# Patient Record
Sex: MALE | Race: WHITE | NOT HISPANIC OR LATINO | Employment: OTHER | URBAN - METROPOLITAN AREA
[De-identification: names, ages, dates, MRNs, and addresses within clinical notes are randomized per-mention and may not be internally consistent; named-entity substitution may affect disease eponyms.]

---

## 2014-06-18 LAB — EJECTION FRACTION: 53

## 2018-02-21 ENCOUNTER — TRANSFERRED RECORDS (OUTPATIENT)
Dept: HEALTH INFORMATION MANAGEMENT | Facility: CLINIC | Age: 78
End: 2018-02-21

## 2018-03-05 ENCOUNTER — TRANSFERRED RECORDS (OUTPATIENT)
Dept: HEALTH INFORMATION MANAGEMENT | Facility: CLINIC | Age: 78
End: 2018-03-05

## 2018-03-05 LAB
ALT SERPL-CCNC: 20 IU/L (ref 6–60)
AST SERPL-CCNC: 24 IU/L (ref 5–40)
CREAT SERPL-MCNC: 0.81 MG/DL (ref 0.7–1.5)
GFR SERPL CREATININE-BSD FRML MDRD: 92 ML/MIN
GLUCOSE SERPL-MCNC: 130 MG/DL (ref 65–120)
INR PPP: 1.16 (ref 0–3.5)
POTASSIUM SERPL-SCNC: 141 MMOL/L (ref 3.6–5.3)

## 2018-04-16 ENCOUNTER — TRANSFERRED RECORDS (OUTPATIENT)
Dept: HEALTH INFORMATION MANAGEMENT | Facility: CLINIC | Age: 78
End: 2018-04-16

## 2018-08-07 LAB
CHOLEST SERPL-MCNC: 142 MG/DL
HBA1C MFR BLD: 6.6 % (ref 0–5.6)
HDLC SERPL-MCNC: 41 MG/DL
LDLC SERPL CALC-MCNC: 75 MG/DL
NONHDLC SERPL-MCNC: 102 MG/DL
TRIGL SERPL-MCNC: 135 MG/DL

## 2018-12-12 ENCOUNTER — TRANSFERRED RECORDS (OUTPATIENT)
Dept: HEALTH INFORMATION MANAGEMENT | Facility: CLINIC | Age: 78
End: 2018-12-12

## 2019-02-27 ENCOUNTER — TRANSFERRED RECORDS (OUTPATIENT)
Dept: HEALTH INFORMATION MANAGEMENT | Facility: CLINIC | Age: 79
End: 2019-02-27

## 2019-02-27 LAB
AST SERPL-CCNC: 25 U/L (ref 8–48)
CHOLEST SERPL-MCNC: 127 MG/DL
CREAT SERPL-MCNC: 0.94 MG/DL (ref 0.74–1.35)
EJECTION FRACTION: 66
GFR SERPL CREATININE-BSD FRML MDRD: 77 ML/MIN/1.73M2
GLUCOSE SERPL-MCNC: 120 MG/DL (ref 70–100)
HBA1C MFR BLD: 5.8 % (ref 4–5.6)
HDLC SERPL-MCNC: 43 MG/DL
INR PPP: 1.6
LDLC SERPL CALC-MCNC: 54 MG/DL
NONHDLC SERPL-MCNC: 84 MG/DL
POTASSIUM SERPL-SCNC: 4 MMOL/L (ref 3.6–5.2)
TRIGL SERPL-MCNC: 151 MG/DL
TSH SERPL-ACNC: 0.3 MIU/L (ref 0.3–4.2)

## 2019-02-28 ENCOUNTER — TRANSFERRED RECORDS (OUTPATIENT)
Dept: HEALTH INFORMATION MANAGEMENT | Facility: CLINIC | Age: 79
End: 2019-02-28

## 2019-03-14 ENCOUNTER — TRANSFERRED RECORDS (OUTPATIENT)
Dept: HEALTH INFORMATION MANAGEMENT | Facility: CLINIC | Age: 79
End: 2019-03-14

## 2019-03-26 ENCOUNTER — OFFICE VISIT (OUTPATIENT)
Dept: FAMILY MEDICINE | Facility: CLINIC | Age: 79
End: 2019-03-26
Payer: MEDICARE

## 2019-03-26 VITALS
DIASTOLIC BLOOD PRESSURE: 70 MMHG | HEART RATE: 86 BPM | WEIGHT: 296 LBS | HEIGHT: 72 IN | TEMPERATURE: 98.6 F | BODY MASS INDEX: 40.09 KG/M2 | SYSTOLIC BLOOD PRESSURE: 117 MMHG | OXYGEN SATURATION: 92 %

## 2019-03-26 DIAGNOSIS — E55.9 VITAMIN D DEFICIENCY: ICD-10-CM

## 2019-03-26 DIAGNOSIS — Z00.00 ROUTINE GENERAL MEDICAL EXAMINATION AT A HEALTH CARE FACILITY: Primary | ICD-10-CM

## 2019-03-26 DIAGNOSIS — E03.9 ACQUIRED HYPOTHYROIDISM: ICD-10-CM

## 2019-03-26 DIAGNOSIS — J44.9 CHRONIC OBSTRUCTIVE PULMONARY DISEASE, UNSPECIFIED COPD TYPE (H): ICD-10-CM

## 2019-03-26 DIAGNOSIS — N40.0 PROSTATISM: ICD-10-CM

## 2019-03-26 DIAGNOSIS — I48.20 CHRONIC ATRIAL FIBRILLATION (H): ICD-10-CM

## 2019-03-26 DIAGNOSIS — I25.10 CORONARY ARTERY DISEASE INVOLVING NATIVE CORONARY ARTERY OF NATIVE HEART WITHOUT ANGINA PECTORIS: ICD-10-CM

## 2019-03-26 DIAGNOSIS — E78.5 HYPERLIPIDEMIA LDL GOAL <100: ICD-10-CM

## 2019-03-26 DIAGNOSIS — F43.21 ADJUSTMENT DISORDER WITH DEPRESSED MOOD: ICD-10-CM

## 2019-03-26 DIAGNOSIS — I10 ESSENTIAL HYPERTENSION: ICD-10-CM

## 2019-03-26 PROCEDURE — 99203 OFFICE O/P NEW LOW 30 MIN: CPT | Performed by: INTERNAL MEDICINE

## 2019-03-26 RX ORDER — METOLAZONE 2.5 MG/1
2.5 TABLET ORAL WEEKLY
COMMUNITY
Start: 2018-12-13 | End: 2019-05-21

## 2019-03-26 RX ORDER — TAURINE 500 MG
500 CAPSULE ORAL 2 TIMES DAILY
COMMUNITY
Start: 2015-07-14 | End: 2021-12-23

## 2019-03-26 RX ORDER — UBIDECARENONE 100 MG
100 CAPSULE ORAL DAILY
COMMUNITY
End: 2022-09-17

## 2019-03-26 RX ORDER — ACETYLCARNITINE 500 MG
1 CAPSULE ORAL 2 TIMES DAILY
COMMUNITY
Start: 2014-12-02 | End: 2021-12-23

## 2019-03-26 RX ORDER — ISOSORBIDE MONONITRATE 30 MG/1
30 TABLET, EXTENDED RELEASE ORAL DAILY
COMMUNITY
Start: 2018-06-22 | End: 2019-05-21

## 2019-03-26 RX ORDER — MULTIVITAMIN
1 TABLET ORAL DAILY
COMMUNITY
Start: 2018-07-12

## 2019-03-26 RX ORDER — TAMSULOSIN HYDROCHLORIDE 0.4 MG/1
1 CAPSULE ORAL DAILY
COMMUNITY
Start: 2014-12-02 | End: 2019-05-21

## 2019-03-26 RX ORDER — ALBUTEROL SULFATE 90 UG/1
2 AEROSOL, METERED RESPIRATORY (INHALATION) EVERY 4 HOURS PRN
COMMUNITY
Start: 2018-10-09 | End: 2019-09-30

## 2019-03-26 RX ORDER — ASPIRIN 81 MG/1
1 TABLET, CHEWABLE ORAL DAILY
COMMUNITY
Start: 2015-11-27

## 2019-03-26 RX ORDER — LEVOTHYROXINE SODIUM 175 UG/1
1 TABLET ORAL DAILY
COMMUNITY
Start: 2015-06-03 | End: 2020-02-10

## 2019-03-26 RX ORDER — CLONAZEPAM 0.5 MG/1
0.5 TABLET ORAL 2 TIMES DAILY PRN
COMMUNITY
Start: 2014-12-02 | End: 2019-05-16

## 2019-03-26 RX ORDER — FOLIC ACID 0.8 MG
1 TABLET ORAL DAILY
COMMUNITY
End: 2019-08-20

## 2019-03-26 RX ORDER — VENLAFAXINE 37.5 MG/1
37.5 TABLET ORAL 2 TIMES DAILY
COMMUNITY
Start: 2019-02-16 | End: 2020-02-10

## 2019-03-26 RX ORDER — ATORVASTATIN CALCIUM 40 MG/1
1 TABLET, FILM COATED ORAL EVERY EVENING
COMMUNITY
Start: 2014-12-02 | End: 2019-04-11

## 2019-03-26 RX ORDER — POTASSIUM CHLORIDE 750 MG/1
10 TABLET, EXTENDED RELEASE ORAL 2 TIMES DAILY
COMMUNITY
Start: 2018-11-28 | End: 2019-11-12

## 2019-03-26 RX ORDER — CARVEDILOL 12.5 MG/1
12.5 TABLET ORAL 2 TIMES DAILY
COMMUNITY
Start: 2018-12-06 | End: 2020-02-10

## 2019-03-26 RX ORDER — DILTIAZEM HYDROCHLORIDE 300 MG/1
300 CAPSULE, COATED, EXTENDED RELEASE ORAL DAILY
COMMUNITY
Start: 2018-09-14 | End: 2019-07-10

## 2019-03-26 RX ORDER — FUROSEMIDE 20 MG
3 TABLET ORAL 2 TIMES DAILY
COMMUNITY
Start: 2018-11-08 | End: 2019-05-02

## 2019-03-26 RX ORDER — WARFARIN SODIUM 5 MG/1
1-1.5 TABLET ORAL DAILY
COMMUNITY
Start: 2014-12-02 | End: 2019-04-11

## 2019-03-26 RX ORDER — LISINOPRIL 40 MG/1
40 TABLET ORAL DAILY
COMMUNITY
Start: 2018-12-06 | End: 2020-02-10

## 2019-03-26 RX ORDER — IPRATROPIUM BROMIDE 21 UG/1
1-2 SPRAY, METERED NASAL
COMMUNITY
Start: 2018-08-14 | End: 2019-08-06

## 2019-03-26 SDOH — HEALTH STABILITY: MENTAL HEALTH: HOW OFTEN DO YOU HAVE A DRINK CONTAINING ALCOHOL?: NEVER

## 2019-03-26 ASSESSMENT — MIFFLIN-ST. JEOR: SCORE: 2095.65

## 2019-03-26 NOTE — PROGRESS NOTES
"  SUBJECTIVE:   Erich Craven is a 78 year old male who presents to clinic today for the following health issues:      New Patient/Transfer of Care  Hx copd, CAD paroxsymal a fib, GERD, hypertension, heart failure, hypothyroidism and sleep apnea presents for ongoing follow up having moved from [place].     Patient presents to clinic to discuss multiple medical concerns. Sleep apnea is one concern as well is his medication management.    Notes he moved here last Dec and would like to establish PCP. About 1 month ago he went to [facility] for f/u who mentioned he should go back on CPAP. Stopped since he was knocking off at night. Had a mouth piece but was not helping. Wakes in the morning feeling a bit fatigued. Will need another sleep study.      Notes his R shoulder \"creeks\" when he reaches and might need some physical therapy.     Has been working on losing weight slowly. He is watching salt in his diet and getting back to the [facility].     Is establishing care with cardiologist at the end of April here. Last INR about 1 month ago and was 1.6.     Has not been taking Atrovent but is taking Albuterol since he ran out of his other inhaler.     Problem list and histories reviewed & adjusted, as indicated.  Additional history: as documented    There is no problem list on file for this patient.    No past surgical history on file.    Social History     Tobacco Use     Smoking status: Former Smoker     Smokeless tobacco: Never Used     Tobacco comment: quit in 1989   Substance Use Topics     Alcohol use: No     Frequency: Never     Comment: quit in 1989     No family history on file.      Current Outpatient Medications   Medication Sig Dispense Refill     Acetylcarnitine HCl (ACETYL L-CARNITINE) 500 MG CAPS Take 1 capsule by mouth 2 times daily       albuterol (PROAIR HFA/PROVENTIL HFA/VENTOLIN HFA) 108 (90 Base) MCG/ACT inhaler Inhale 2 puffs into the lungs every 4 hours as needed       aspirin (ASA) 81 MG chewable tablet " Take 1 tablet by mouth daily       atorvastatin (LIPITOR) 40 MG tablet Take 1 tablet by mouth every evening       CALCIUM PO Take 600 mg by mouth daily       carvedilol (COREG) 12.5 MG tablet Take 12.5 mg by mouth 2 times daily       Cholecalciferol (VITAMIN D3) 1000 units CAPS Take 1,000 Units by mouth daily       clonazePAM (KLONOPIN) 0.5 MG tablet Take 0.5 mg by mouth 2 times daily as needed       diltiazem ER COATED BEADS (CARDIZEM CD/CARTIA XT) 300 MG 24 hr capsule Take 300 mg by mouth daily       furosemide (LASIX) 20 MG tablet Take 3 tablets by mouth 2 times daily       Glycopyrrolate-Formoterol (BEVESPI AEROSPHERE) 9-4.8 MCG/ACT oral inhaler Inhale 2 puffs into the lungs 2 times daily       ipratropium (ATROVENT) 0.03 % nasal spray Inhale 1-2 sprays into the lungs nightly as needed       isosorbide mononitrate (IMDUR) 30 MG 24 hr tablet Take 30 mg by mouth daily       levothyroxine (SYNTHROID/LEVOTHROID) 175 MCG tablet Take 1 tablet by mouth daily Additional 1/2 tablet on Sundays       lisinopril (PRINIVIL/ZESTRIL) 40 MG tablet Take 40 mg by mouth daily       metolazone (ZAROXOLYN) 2.5 MG tablet Take 2.5 mg by mouth once a week On Thursday       Multiple vitamin TABS Take 1 tablet by mouth daily       potassium chloride ER (K-TAB/KLOR-CON) 10 MEQ CR tablet Take 10 mEq by mouth 2 times daily       tamsulosin (FLOMAX) 0.4 MG capsule Take 1 capsule by mouth daily       Taurine 500 MG CAPS Take 500 mg by mouth 2 times daily       venlafaxine (EFFEXOR) 37.5 MG tablet Take 37.5 mg by mouth 2 times daily       warfarin (COUMADIN) 5 MG tablet Take 1-1.5 tablets by mouth daily As directed       B Complex Vitamins (VITAMIN B COMPLEX PO) Take 1 capsule by mouth daily       co-enzyme Q-10 100 MG CAPS capsule Take 100 mg by mouth daily       Magnesium 500 MG CAPS Take 1 capsule by mouth daily       No Known Allergies  BP Readings from Last 3 Encounters:   03/26/19 117/70    Wt Readings from Last 3 Encounters:   03/26/19  134.3 kg (296 lb)                  Labs reviewed in EPIC    Reviewed and updated as needed this visit by clinical staff  Tobacco  Allergies  Meds  Soc Hx      Reviewed and updated as needed this visit by Provider         ROS:  Constitutional, HEENT, cardiovascular, pulmonary, GI, , musculoskeletal, neuro, skin, endocrine and psych systems are negative, except as otherwise noted.    2 pillow orthopnea, no PND or angina   OBJECTIVE:     /70 (BP Location: Left arm, Patient Position: Sitting, Cuff Size: Adult Large)   Pulse 86   Temp 98.6  F (37  C) (Oral)   Ht 1.829 m (6')   Wt 134.3 kg (296 lb)   SpO2 92%   BMI 40.14 kg/m    Body mass index is 40.14 kg/m .  GENERAL: healthy, alert and no distress  EYES: Eyes grossly normal to inspection, PERRL and conjunctivae and sclerae normal  HENT: ear canals and TM's normal, nose and mouth without ulcers or lesions  NECK: no adenopathy, no asymmetry, masses, or scars and thyroid normal to palpation  RESP: lungs clear to auscultation - rhonchi or wheezes rales in the R base  CV: regular rate and rhythm, normal S1 S2, no S3 or S4, no murmur, click or rub, no peripheral edema and peripheral pulses strong   ABDOMEN:obese, soft, nontender, no hepatosplenomegaly, no masses and bowel sounds normal  MS: no gross musculoskeletal defects noted, 2-3 + tibial edema with chronic venus stasis changes without palpable cords   R shoulder abduction is Less 90 degrees internal rotation less 90 degrees and external is 30 degrees   SKIN: no suspicious lesions or rashes  NEURO: Normal strength and tone, mentation intact and speech normal  PSYCH: mentation appears normal, affect normal/bright    Diagnostic Test Results:  No results found for this or any previous visit (from the past 24 hour(s)).    ASSESSMENT/PLAN:   1. Routine general medical examination at a health care facility    - Glycopyrrolate-Formoterol (BEVESPI AEROSPHERE) 9-4.8 MCG/ACT oral inhaler; Inhale 2 puffs into the  lungs 2 times daily  Dispense: 1 Inhaler; Refill: 3  - Glycopyrrolate-Formoterol (BEVESPI AEROSPHERE) 9-4.8 MCG/ACT oral inhaler; Inhale 2 puffs into the lungs 2 times daily  Dispense: 10.7 g; Refill: 3  1. Routine general medical examination at a health care facility    - Glycopyrrolate-Formoterol (BEVESPI AEROSPHERE) 9-4.8 MCG/ACT oral inhaler; Inhale 2 puffs into the lungs 2 times daily  Dispense: 1 Inhaler; Refill: 3  - Glycopyrrolate-Formoterol (BEVESPI AEROSPHERE) 9-4.8 MCG/ACT oral inhaler; Inhale 2 puffs into the lungs 2 times daily  Dispense: 10.7 g; Refill: 3    2. Chronic obstructive pulmonary disease, unspecified COPD type (H)      3. Chronic atrial fibrillation (H)      4. Adjustment disorder with depressed mood      5. Hyperlipidemia LDL goal <100      6. Prostatism      7. Acquired hypothyroidism      8. Coronary artery disease involving native coronary artery of native heart without angina pectoris      9. Essential hypertension      10. Vitamin D deficiency        Reviewed PMHx which he brought with him to review his recent [facility] reports.     Encouraged regular exercise and balanced diet. Embarking on new exercise routine.     Recently seen in [facility] and advised to get back into sleep clinic to discuss CPAP he can tolerate.     Establishing with cardiologist at the end of April. Will establish with our INR clinic here.     Shoulder is relatively frozen due to injury in the past. Will send to physical therapy along with cortisone injection prior to physical therapy. Return to clinic in 2 weeks for shoulder injection.         The information in this document, created by the medical scribe for me, accurately reflects the services I personally performed and the decisions made by me. I have reviewed and approved this document for accuracy prior to leaving the patient care area.  Dontrell Gómez MD  [facility]

## 2019-04-04 ENCOUNTER — ANTICOAGULATION THERAPY VISIT (OUTPATIENT)
Dept: NURSING | Facility: CLINIC | Age: 79
End: 2019-04-04
Payer: MEDICARE

## 2019-04-04 ENCOUNTER — TELEPHONE (OUTPATIENT)
Dept: FAMILY MEDICINE | Facility: CLINIC | Age: 79
End: 2019-04-04

## 2019-04-04 DIAGNOSIS — J44.9 CHRONIC OBSTRUCTIVE PULMONARY DISEASE, UNSPECIFIED COPD TYPE (H): Primary | ICD-10-CM

## 2019-04-04 DIAGNOSIS — I48.20 CHRONIC ATRIAL FIBRILLATION (H): Primary | ICD-10-CM

## 2019-04-04 PROBLEM — I48.91 ATRIAL FIBRILLATION (H): Status: ACTIVE | Noted: 2019-04-04

## 2019-04-04 PROBLEM — I82.409 DEEP VEIN THROMBOSIS (DVT) (H): Status: ACTIVE | Noted: 2019-04-04

## 2019-04-04 LAB — INR POINT OF CARE: 1.6 (ref 0.86–1.14)

## 2019-04-04 PROCEDURE — 85610 PROTHROMBIN TIME: CPT | Mod: QW

## 2019-04-04 PROCEDURE — 36416 COLLJ CAPILLARY BLOOD SPEC: CPT

## 2019-04-04 PROCEDURE — 99207 ZZC NO CHARGE NURSE ONLY: CPT

## 2019-04-04 NOTE — TELEPHONE ENCOUNTER
Routing refill request to provider for review/approval because:  Medication is reported/historical    Patient seen in INR today.   Established care with Dr Gómez 3-26-19.    Requesting RX Glycopyrrolate.  On med list---historical.  Not yet RX'd by Dr Gómez.      Also--no DX to associate with this med. Patient reports has COPD.   And patient reports takes only in the morning, not BID.      Patient out of the med.      Please review and complete.   Lanny GARNICA RN,BSN

## 2019-04-04 NOTE — TELEPHONE ENCOUNTER
Please COMPLETE and sign annual INR Referral--pended.   Please CLOSE encounter after completed.         Thank you,  Lanny GARNICA RN,BSN

## 2019-04-04 NOTE — PROGRESS NOTES
ANTICOAGULATION INITIAL CLINIC VISIT    Patient Name:  Erich Craven  Date:  2019  Referred by: Dr Gómez  Contact Type:  Face to Face    SUBJECTIVE:  Coumadin education was completed today.  Topics covered include:  -Introduction to coumadin  -Proper Administration  -INR Testing  -Sign/Symptoms of Bleeding  -Signs/Symptoms of Clot Formation or Stroke  -Dietary Intake of Vitamin K  -Drug Interactions  -Anticoagulation Identification (bracelet, necklace or wallet card)  -Future Surgery  -Effects of Alcohol, Tobacco, and Exercise on Coumadin    Coumadin Education Booklet and Coumadin Identification Wallet Card were given to the patient.         OBJECTIVE    INR Protime   Date Value Ref Range Status   2019 1.6 (A) 0.86 - 1.14 Final       ASSESSMENT / PLAN  INR assessment SUB    Recheck INR In: 1 WEEK    INR Location Clinic      Anticoagulation Summary  As of 2019    INR goal:   2.0-3.0   TTR:   --   INR used for dosin.6! (2019)   Warfarin maintenance plan:   7.5 mg (5 mg x 1.5) every Mon, Wed, Fri; 5 mg (5 mg x 1) all other days   Full warfarin instructions:   : 7.5 mg; : 7.5 mg; Otherwise 7.5 mg every Mon, Wed, Fri; 5 mg all other days   Weekly warfarin total:   42.5 mg   Plan last modified:   Lanny Spivey RN (2019)   Next INR check:   2019   Target end date:       Indications    Atrial fibrillation (H) [I48.91] [I48.91]  Deep vein thrombosis (DVT) (H) [I82.409] [I82.409]             Anticoagulation Episode Summary     INR check location:       Preferred lab:       Send INR reminders to:   [facility] ANTICOAGULATION    Comments:         Anticoagulation Care Providers     Provider Role Specialty Phone number    Dontrell Gómez MD Referring Internal Medicine [phone]            See the Encounter Report to view Anticoagulation Flowsheet and Dosing Calendar (Go to Encounters tab in chart review, and find the Anticoagulation Therapy Visit)    Dosage adjustment made based on physician  "directed care plan.    At today's visit patient's INR 1.6  Patient reports taking Warfarin since 2012 for A Fib and states has had a \"clot\".   Patient moved to [place] recently.   Established care with Dr Gómez.    Problem list not complete for this patient yet.      Some signs and symptoms of clots include: pain or tenderness in arm or leg, swelling in arm or leg, changes in skin color, or area is warm to touch, shortness or breath, trouble breathing.  Numbness or weakness especially on 1 side of the body, sudden trouble speaking or swallowing, sudden trouble seeing, sudden confusion, dizzy spells or headache.  If you have these please call 911 or seek medical care immediately.      Lanny Spivey RN            "

## 2019-04-11 ENCOUNTER — OFFICE VISIT (OUTPATIENT)
Dept: FAMILY MEDICINE | Facility: CLINIC | Age: 79
End: 2019-04-11
Payer: MEDICARE

## 2019-04-11 ENCOUNTER — ANTICOAGULATION THERAPY VISIT (OUTPATIENT)
Dept: NURSING | Facility: CLINIC | Age: 79
End: 2019-04-11
Payer: MEDICARE

## 2019-04-11 VITALS
BODY MASS INDEX: 40.63 KG/M2 | TEMPERATURE: 97.9 F | WEIGHT: 300 LBS | SYSTOLIC BLOOD PRESSURE: 117 MMHG | HEIGHT: 72 IN | DIASTOLIC BLOOD PRESSURE: 80 MMHG | OXYGEN SATURATION: 92 % | HEART RATE: 94 BPM

## 2019-04-11 DIAGNOSIS — E66.01 MORBID OBESITY (H): ICD-10-CM

## 2019-04-11 DIAGNOSIS — I48.20 CHRONIC ATRIAL FIBRILLATION (H): Primary | ICD-10-CM

## 2019-04-11 DIAGNOSIS — M75.91 RIGHT SUPRASPINATUS TENDINITIS: ICD-10-CM

## 2019-04-11 DIAGNOSIS — I82.4Y9 DEEP VEIN THROMBOSIS (DVT) OF PROXIMAL LOWER EXTREMITY, UNSPECIFIED CHRONICITY, UNSPECIFIED LATERALITY (H): ICD-10-CM

## 2019-04-11 DIAGNOSIS — E78.5 HYPERLIPIDEMIA LDL GOAL <100: ICD-10-CM

## 2019-04-11 DIAGNOSIS — M19.011 PRIMARY OSTEOARTHRITIS OF RIGHT SHOULDER: ICD-10-CM

## 2019-04-11 DIAGNOSIS — M15.0 PRIMARY OSTEOARTHRITIS INVOLVING MULTIPLE JOINTS: ICD-10-CM

## 2019-04-11 DIAGNOSIS — I48.91 ATRIAL FIBRILLATION (H): Primary | ICD-10-CM

## 2019-04-11 LAB — INR POINT OF CARE: 2.1 (ref 0.86–1.14)

## 2019-04-11 PROCEDURE — 20610 DRAIN/INJ JOINT/BURSA W/O US: CPT | Performed by: INTERNAL MEDICINE

## 2019-04-11 PROCEDURE — 85610 PROTHROMBIN TIME: CPT | Mod: QW

## 2019-04-11 PROCEDURE — 99207 ZZC NO CHARGE NURSE ONLY: CPT

## 2019-04-11 PROCEDURE — 20550 NJX 1 TENDON SHEATH/LIGAMENT: CPT | Mod: 51 | Performed by: INTERNAL MEDICINE

## 2019-04-11 PROCEDURE — 36416 COLLJ CAPILLARY BLOOD SPEC: CPT

## 2019-04-11 PROCEDURE — 99214 OFFICE O/P EST MOD 30 MIN: CPT | Mod: 25 | Performed by: INTERNAL MEDICINE

## 2019-04-11 RX ORDER — WARFARIN SODIUM 5 MG/1
5-7.5 TABLET ORAL DAILY
Qty: 135 TABLET | Refills: 0 | Status: SHIPPED | OUTPATIENT
Start: 2019-04-11 | End: 2019-07-03

## 2019-04-11 RX ORDER — WARFARIN SODIUM 5 MG/1
5-7.5 TABLET ORAL DAILY
Qty: 135 TABLET | Refills: 0 | Status: SHIPPED | OUTPATIENT
Start: 2019-04-11 | End: 2019-04-11

## 2019-04-11 RX ORDER — ATORVASTATIN CALCIUM 40 MG/1
40 TABLET, FILM COATED ORAL EVERY EVENING
Qty: 90 TABLET | Refills: 3 | Status: SHIPPED | OUTPATIENT
Start: 2019-04-11 | End: 2020-02-10

## 2019-04-11 ASSESSMENT — MIFFLIN-ST. JEOR: SCORE: 2113.79

## 2019-04-11 NOTE — PROGRESS NOTES
SUBJECTIVE:   Erich Craven is a 78 year old male who presents to clinic today for the following   health issues:      Patient presents to clinic for two week follow up on right shoulder pain, patient was wanting to get cortisone injection completed at today's visit.     Patient canceled his travel plans after last month's exam because of his poorly regulated INR.    Wants  a podiatrist    Inhaler is nonformulary wants  a cost effective substitute          Additional history: as documented    Reviewed  and updated as needed this visit by clinical staff  Tobacco  Allergies  Meds         Reviewed and updated as needed this visit by Provider         Current Outpatient Medications   Medication Sig Dispense Refill     Acetylcarnitine HCl (ACETYL L-CARNITINE) 500 MG CAPS Take 1 capsule by mouth 2 times daily       albuterol (PROAIR HFA/PROVENTIL HFA/VENTOLIN HFA) 108 (90 Base) MCG/ACT inhaler Inhale 2 puffs into the lungs every 4 hours as needed       aspirin (ASA) 81 MG chewable tablet Take 1 tablet by mouth daily       atorvastatin (LIPITOR) 40 MG tablet Take 1 tablet (40 mg) by mouth every evening 90 tablet 3     B Complex Vitamins (VITAMIN B COMPLEX PO) Take 1 capsule by mouth daily       CALCIUM PO Take 600 mg by mouth daily       carvedilol (COREG) 12.5 MG tablet Take 12.5 mg by mouth 2 times daily       Cholecalciferol (VITAMIN D3) 1000 units CAPS Take 1,000 Units by mouth daily       clonazePAM (KLONOPIN) 0.5 MG tablet Take 0.5 mg by mouth 2 times daily as needed       co-enzyme Q-10 100 MG CAPS capsule Take 100 mg by mouth daily       diltiazem ER COATED BEADS (CARDIZEM CD/CARTIA XT) 300 MG 24 hr capsule Take 300 mg by mouth daily       furosemide (LASIX) 20 MG tablet Take 3 tablets by mouth 2 times daily       Glycopyrrolate-Formoterol (BEVESPI AEROSPHERE) 9-4.8 MCG/ACT oral inhaler Inhale 2 puffs into the lungs 2 times daily 1 Inhaler 3     Glycopyrrolate-Formoterol (BEVESPI AEROSPHERE) 9-4.8 MCG/ACT oral  inhaler Inhale 2 puffs into the lungs 2 times daily 10.7 g 3     ipratropium (ATROVENT) 0.03 % nasal spray Inhale 1-2 sprays into the lungs nightly as needed       Ipratropium-Albuterol (COMBIVENT RESPIMAT)  MCG/ACT inhaler Inhale 2 puffs into the lungs 2 times daily 1 Inhaler 3     isosorbide mononitrate (IMDUR) 30 MG 24 hr tablet Take 30 mg by mouth daily       levothyroxine (SYNTHROID/LEVOTHROID) 175 MCG tablet Take 1 tablet by mouth daily Additional 1/2 tablet on Sundays       lisinopril (PRINIVIL/ZESTRIL) 40 MG tablet Take 40 mg by mouth daily       Magnesium 500 MG CAPS Take 1 capsule by mouth daily       metolazone (ZAROXOLYN) 2.5 MG tablet Take 2.5 mg by mouth once a week On Thursday       Multiple vitamin TABS Take 1 tablet by mouth daily       potassium chloride ER (K-TAB/KLOR-CON) 10 MEQ CR tablet Take 10 mEq by mouth 2 times daily       tamsulosin (FLOMAX) 0.4 MG capsule Take 1 capsule by mouth daily       Taurine 500 MG CAPS Take 500 mg by mouth 2 times daily       venlafaxine (EFFEXOR) 37.5 MG tablet Take 37.5 mg by mouth 2 times daily       warfarin (COUMADIN) 5 MG tablet Take 1-1.5 tablets (5-7.5 mg) by mouth daily As directed by INR clinic 135 tablet 0     No Known Allergies    ROS:  Constitutional, HEENT, cardiovascular, pulmonary, gi and gu systems are negative, except as otherwise noted.    OBJECTIVE:     /80 (BP Location: Right arm, Patient Position: Sitting, Cuff Size: Adult Large)   Pulse 94   Temp 97.9  F (36.6  C) (Oral)   Ht 1.829 m (6')   Wt 136.1 kg (300 lb)   SpO2 92%   BMI 40.69 kg/m    Body mass index is 40.69 kg/m .  GENERAL: healthy, alert and no distress  NECK: no adenopathy, no asymmetry, masses, or scars and thyroid normal to palpation  RESP: lungs clear to auscultation - no rales, rhonchi or wheezes  CV: Irregularly irregular rhythm with a well-controlled ventricular response normal S1 S2, no S3 or S4, no murmur, click or rub, no peripheral edema and peripheral  pulses strong  ABDOMEN: soft, nontender, no hepatosplenomegaly, no masses and bowel sounds normal  MS:  musculoskeletal defects noted in the right shoulder tenderness of the right supraspinatus tendon, flexion and extension were normal internal and external rotation of the right shoulder were reduced to less than 90 and less than 30 respectively  , 2+ pretibial edema    Procedure: Right supraspinatus tendinitis  After giving a review of the opportunities for improvement consent was given to proceed with a supraspinatus tendon injection.  The supraspinatus tendon was localized and marked lateral and medial to the tendon.  The skin was cleaned and prepped and using a 25-gauge 5/8 inch needle Decadron and lidocaine were injected alongside the medial and lateral aspects of the tendon without difficulty.    Procedure: Right glenohumeral joint injection.  Consent was obtained using a posterior approach the skin was demarcated, cleaned and prepped and using a 21-gauge 2 inch needle 60 mg of Medrol with lidocaine and Marcaine were injected without difficulty    ASSESSMENT/PLAN:             1. Chronic atrial fibrillation (H)    - SLEEP EVALUATION & MANAGEMENT REFERRAL - [facility] [phone]  (Age 18 and up); Future    Letter sent to the airline to request refund for missed travel  2. Deep vein thrombosis (DVT) of proximal lower extremity, unspecified chronicity, unspecified laterality (H)      3. Hyperlipidemia LDL goal <100    - atorvastatin (LIPITOR) 40 MG tablet; Take 1 tablet (40 mg) by mouth every evening  Dispense: 90 tablet; Refill: 3    4. Primary osteoarthritis involving multiple joints    - [facility] REFERRAL; Future    5. Morbid obesity (H)  Weight management indicated    6.  Supraspinatus tendinitis  After the steroid injection the patient was recommended to proceed with physical therapy to improve the functional mobility and strength of his right  shoulder    7.  Osteoarthritis of the right shoulder  Steroid injection and see recommendations above  Dontrell Gómez MD  [facility]

## 2019-04-11 NOTE — LETTER
April 11, 2019      Erich Craven  [address]  [facility] [place] [facility] [address]    To whom it may concern:    Mr. Craven is being followed for chronic deep venous thrombosis.  Because of his anticoagulation status he was highly recommended not to be flying and because of this he had to change his travel plans.  If you would please help him to be credited for his recent flight that he was forced to cancel.      If you require more information please contact me.        Sincerely,        Dontrell Gómez MD

## 2019-04-11 NOTE — PROGRESS NOTES
ANTICOAGULATION FOLLOW-UP CLINIC VISIT    Patient Name:  Erich Craven  Date:  2019  Contact Type:  Face to Face    SUBJECTIVE:     Patient Findings     Comments:   The patient was assessed for diet, medication, and activity level changes, missed or extra doses, bruising or bleeding, with no problem findings.             OBJECTIVE    INR Protime   Date Value Ref Range Status   2019 2.1 (A) 0.86 - 1.14 Final       ASSESSMENT / PLAN  INR assessment THER    Recheck INR In: 3 WEEKS    INR Location Clinic      Anticoagulation Summary  As of 2019    INR goal:   2.0-3.0   TTR:   --   INR used for dosin.1 (2019)   Warfarin maintenance plan:   7.5 mg (5 mg x 1.5) every Mon, Wed, Fri; 5 mg (5 mg x 1) all other days   Full warfarin instructions:   7.5 mg every Mon, Wed, Fri; 5 mg all other days   Weekly warfarin total:   42.5 mg   No change documented:   Lanny Spivey RN   Plan last modified:   Lanny Spivey RN (2019)   Next INR check:   2019   Target end date:       Indications    Atrial fibrillation (H) [I48.91] [I48.91]  Deep vein thrombosis (DVT) (H) [I82.409] [I82.409]             Anticoagulation Episode Summary     INR check location:       Preferred lab:       Send INR reminders to:    ANTICOAGULATION    Comments:         Anticoagulation Care Providers     Provider Role Specialty Phone number    Dontrell Gómez MD Referring Internal Medicine [phone]            See the Encounter Report to view Anticoagulation Flowsheet and Dosing Calendar (Go to Encounters tab in chart review, and find the Anticoagulation Therapy Visit)    Dosage adjustment made based on physician directed care plan.    Lanny Spivey RN

## 2019-04-14 PROBLEM — E66.01 MORBID OBESITY (H): Status: ACTIVE | Noted: 2019-04-14

## 2019-04-14 RX ORDER — DEXAMETHASONE SODIUM PHOSPHATE 4 MG/ML
4 INJECTION, SOLUTION INTRA-ARTICULAR; INTRALESIONAL; INTRAMUSCULAR; INTRAVENOUS; SOFT TISSUE ONCE
Qty: 1 ML | Refills: 0 | OUTPATIENT
Start: 2019-04-14 | End: 2019-08-06

## 2019-04-18 ENCOUNTER — THERAPY VISIT (OUTPATIENT)
Dept: PHYSICAL THERAPY | Facility: CLINIC | Age: 79
End: 2019-04-18
Payer: MEDICARE

## 2019-04-18 DIAGNOSIS — M15.0 PRIMARY OSTEOARTHRITIS INVOLVING MULTIPLE JOINTS: ICD-10-CM

## 2019-04-18 DIAGNOSIS — G89.29 CHRONIC RIGHT SHOULDER PAIN: ICD-10-CM

## 2019-04-18 DIAGNOSIS — M75.41 IMPINGEMENT SYNDROME, SHOULDER, RIGHT: ICD-10-CM

## 2019-04-18 DIAGNOSIS — M25.511 CHRONIC RIGHT SHOULDER PAIN: ICD-10-CM

## 2019-04-18 PROCEDURE — 97161 PT EVAL LOW COMPLEX 20 MIN: CPT | Mod: GP | Performed by: PHYSICAL THERAPIST

## 2019-04-18 PROCEDURE — 97112 NEUROMUSCULAR REEDUCATION: CPT | Mod: GP | Performed by: PHYSICAL THERAPIST

## 2019-04-18 NOTE — LETTER
DEPARTMENT OF HEALTH AND HUMAN SERVICES  CENTERS FOR MEDICARE & MEDICAID SERVICES    PLAN/UPDATED PLAN OF PROGRESS FOR OUTPATIENT REHABILITATION    PATIENTS NAME:  Erich Craven   : 1940    PROVIDER NUMBER:    [id number]    HICN:  [id number]     PROVIDER NAME: [facility] PHYSICAL THERAPY    MEDICAL RECORD NUMBER: [id number]     START OF CARE DATE:  SOC Date: 19   TYPE:  PT    PRIMARY/TREATMENT DIAGNOSIS: (Pertinent Medical Diagnosis)     Primary osteoarthritis involving multiple joints  Chronic right shoulder pain  Impingement syndrome, shoulder, right    VISITS FROM START OF CARE:  Rxs Used: 1     Precautions/Restrictions/MD instructions: PT eval and treat. 6 (e&t)  Subjective:   Date of Onset: Shoulder symptoms began about 2 years ago.   Order: 2019  Primary Symptoms: Pain with lifting arm overhead, behind the back, reaching out front or to the side.   Pains are described as constant in nature. Pain is rated 2/10.   History of symptoms: Pains began gradually as the result of insidious onset.     Worsened by: reaching out, above head, behind back, laying on shoulder, not taking tylenol.    Alleviated by: Tylenol.    Pertinent medical/surgical history: Atrial fibrillation: pacemaker for last 4 years, history of DVT in LE, patient takes coumadin. Chemical dependency, heart problems, high blood pressure, sleep disorder, thyroid problems, calf pain, swelling-warmth.  Imaging: no imaging on shoulder yet.   Current occupational status: Retired.   Patient's goals are: decrease pain, wants an exercise regime, lose weight, less pain with shoulder motion.   Return to MD:  PRN. Cardiologist at end of month. No follow up with physician noted.   *Patient denies any red flags.   Pain location: Anterior lateral arm, down to mid upper arm. RT>LT with symptoms.     Therapist Impression:   Patient is a 78 year old male with history of right shoulder pain. He presents with shoulder  impingement and signs and symptoms consistent with rotator cuff tendinopathy. These impairments limit his ability to reach, lift and perform ADL's. Skilled PT services are necessary in order to reduce impairments and improve independent function.    PATIENTS NAME:  Erich Craven   : 1940    SHOULDER EXAMINATION      Cervical Screen    Dermatome C4 C5 C6 C7 C8 T1    wnl wnl wnl wnl wnl wnl   DTR (RT) C5 C6 C7 (LT) C5 C6 C7              Myotome Right Left   C1-2 5/5 5/5   C3 5/5 5/5   C4 5/5 5/5   C5 4+/5, + 5-/5, +   C6 5/5 5/5/   C7 5/5 5/5   C8 5/5 5/5   T1 5/5 5/5   Special Tests     Spurlings     Compression     Distraction     Shoulder Screen    Hand Dominance: Right    STATIC POSTURE  Forward head on neck, Increased thoracic kyphosis and Rounded shoulders     SCAPULAR KINEMATIC EVALUATION  Position/Test Findings   Hands resting at sides No obvious findings   Hands on hips No obvious findings   90deg scaption No obvious findings   Repeated scaption No obvious findings   Resisted scaption NA   Flip sign/resisted ER NA     SHOULDER RANGE OF MOTION & STRENGTH  (*Indicates patient s pain)   AROM RT AROM LT PROM RT PROM LT MMT   Flex 110 + 130 + 120 + 140 + 4+/5 ++    + 130+ >105 + >130 + 4+/5 ++   ER 40+ 70 + 40++ 70 + 4+/5 +   IR sacrum lumbar   5/5   Ext wnl wnl      Add     5/5   Pull Test     negative   Elbow Flex wnl wnl      Elbow Ext Slight loss wnl        JOINT MOBILITY  NA  SPECIAL TESTS   (+) L: NA   (-) L: Load and shift  (+) R: NeerChristine-Ernie, Empty can and Positive painful arc   (-) R: Load and shift    Nerve Testing (ULTT)    ULTT RT Left   Median      Ulnar     Radial            Palpation: Tightness of bilateral trapezius noted.   Neuromuscular Assessment: patient demonstrates compensatory involvement of upper trapezius resulting in shoulder elevation and increase of shoulder pain and decrease in ROM.     Assessment/Plan:    Patient is a 78 year old male with both sides shoulder  complaints.    Patient has the following significant findings with corresponding treatment plan.                Diagnosis 1:  Bilateral shoulder pain, shoulder impingement with signs and symptoms consistent with rotator cuff tendinopathy    Pain -  hot/cold therapy and manual therapy  Decreased ROM/flexibility - manual therapy, therapeutic exercise and home program  Decreased strength - therapeutic exercise, therapeutic activities and home program  Impaired muscle performance - neuro re-education and home program  Decreased function - therapeutic activities and home program    Therapy Evaluation Codes:   1) History comprised of:   Personal factors that impact the plan of care:      Age.    Comorbidity factors that impact the plan of care are:      Chemical Dependency, Heart problems, Sleep disorder/apnea and thyroid problems, DVT.     Medications impacting care: Cardiac, High blood pressure, Heparin/coumadin, Pain and Thyroid.  2) Examination of Body Systems comprised of:   Body structures and functions that impact the plan of care:      Shoulder.   Activity limitations that impact the plan of care are:      Lifting and reaching.    PATIENTS NAME:  Erich Craven   : 1940    3) Clinical presentation characteristics are:   Stable/Uncomplicated.  4) Decision-Making    Low complexity using standardized patient assessment instrument and/or measureable assessment of functional outcome.  Cumulative Therapy Evaluation is: Low complexity.    Communication ability:  Patient appears to be able to clearly communicate and understand verbal and written communication and follow directions correctly.  Treatment Explanation - The following has been discussed with the patient:   RX ordered/plan of care  Anticipated outcomes  Possible risks and side effects  This patient would benefit from PT intervention to resume normal activities.   Rehab potential is good.  Frequency:  1 X week, once daily  Duration:  for 6  "weeks  Discharge Plan:  Achieve all LTG.  Independent in home treatment program.  Reach maximal therapeutic benefit.        Caregiver Signature/Credentials _____________________________ Date ________       Treating Provider: Kevin Busby DPT, CSCS     I have reviewed and certified the need for these services and plan of treatment while under my care.        PHYSICIAN'S SIGNATURE: ______________________________________  Date___________                         Dontrell Gómez MD    Certification period:  Beginning of Cert date period: 04/18/19 to  End of Cert period date: 06/22/19     Functional Level Progress Report: Please see attached \"Goal Flow sheet for Functional level.\"    ____X____Continue Services or________ DC Services                Service dates: From  SOC Date: 04/18/19 date to present                         "

## 2019-04-25 ENCOUNTER — DOCUMENTATION ONLY (OUTPATIENT)
Dept: CARDIOLOGY | Facility: CLINIC | Age: 79
End: 2019-04-25

## 2019-04-30 ENCOUNTER — ANCILLARY PROCEDURE (OUTPATIENT)
Dept: CARDIOLOGY | Facility: CLINIC | Age: 79
End: 2019-04-30
Attending: INTERNAL MEDICINE
Payer: MEDICARE

## 2019-04-30 ENCOUNTER — HOSPITAL ENCOUNTER (OUTPATIENT)
Dept: GENERAL RADIOLOGY | Facility: CLINIC | Age: 79
Discharge: HOME | End: 2019-04-30
Attending: INTERNAL MEDICINE | Admitting: INTERNAL MEDICINE
Payer: MEDICARE

## 2019-04-30 ENCOUNTER — OFFICE VISIT (OUTPATIENT)
Dept: CARDIOLOGY | Facility: CLINIC | Age: 79
End: 2019-04-30
Payer: MEDICARE

## 2019-04-30 VITALS
SYSTOLIC BLOOD PRESSURE: 134 MMHG | WEIGHT: 300.4 LBS | HEIGHT: 72 IN | DIASTOLIC BLOOD PRESSURE: 84 MMHG | HEART RATE: 84 BPM | BODY MASS INDEX: 40.69 KG/M2 | OXYGEN SATURATION: 93 %

## 2019-04-30 DIAGNOSIS — Z95.0 PRESENCE OF PERMANENT CARDIAC PACEMAKER: ICD-10-CM

## 2019-04-30 DIAGNOSIS — I25.10 CORONARY ARTERY DISEASE INVOLVING NATIVE CORONARY ARTERY OF NATIVE HEART WITHOUT ANGINA PECTORIS: ICD-10-CM

## 2019-04-30 DIAGNOSIS — I50.30 (HFPEF) HEART FAILURE WITH PRESERVED EJECTION FRACTION (H): ICD-10-CM

## 2019-04-30 DIAGNOSIS — I42.2 HYPERTROPHIC CARDIOMYOPATHY (H): ICD-10-CM

## 2019-04-30 DIAGNOSIS — E66.01 MORBID OBESITY (H): ICD-10-CM

## 2019-04-30 DIAGNOSIS — G47.33 OSA (OBSTRUCTIVE SLEEP APNEA): ICD-10-CM

## 2019-04-30 DIAGNOSIS — I50.30 (HFPEF) HEART FAILURE WITH PRESERVED EJECTION FRACTION (H): Primary | ICD-10-CM

## 2019-04-30 DIAGNOSIS — I48.0 PAROXYSMAL ATRIAL FIBRILLATION (H): ICD-10-CM

## 2019-04-30 DIAGNOSIS — Z95.0 CARDIAC PACEMAKER IN SITU: Primary | ICD-10-CM

## 2019-04-30 LAB — NT-PROBNP SERPL-MCNC: 71 PG/ML (ref 0–450)

## 2019-04-30 PROCEDURE — 83880 ASSAY OF NATRIURETIC PEPTIDE: CPT | Performed by: INTERNAL MEDICINE

## 2019-04-30 PROCEDURE — 71046 X-RAY EXAM CHEST 2 VIEWS: CPT

## 2019-04-30 PROCEDURE — 93280 PM DEVICE PROGR EVAL DUAL: CPT | Performed by: INTERNAL MEDICINE

## 2019-04-30 PROCEDURE — 36415 COLL VENOUS BLD VENIPUNCTURE: CPT | Performed by: INTERNAL MEDICINE

## 2019-04-30 PROCEDURE — 99203 OFFICE O/P NEW LOW 30 MIN: CPT | Performed by: INTERNAL MEDICINE

## 2019-04-30 ASSESSMENT — MIFFLIN-ST. JEOR: SCORE: 2115.61

## 2019-04-30 NOTE — LETTER
4/30/2019      Dontrell Gómez MD  [address] [facility] [address]  [facility] [address]      RE: Erich Craven       Dear Colleague,    I had the pleasure of seeing Erich Craven in the [facility].    Service Date: 04/30/2019      REFERRING PHYSICIAN:  Dr. Dontrell Gómez.        HISTORY OF PRESENT ILLNESS:  It is my pleasure to see your patient, Erich Craven, who has moved from [facility].  This is a patient with a past history of coronary artery disease with stenting of the proximal LAD and stenting of the mid and proximal right coronary artery in 2014.  He also carries a diagnosis of obstructive sleep apnea and has had a permanent pacemaker placed in the past.  He also has a history of paroxysmal atrial fibrillation and carries a diagnosis of heart failure with preserved ejection fraction.  His last echocardiogram was performed in February of this year and this showed that the patient has an ejection fraction of 66%.  He has grade 1 diastolic dysfunction.  Interestingly, his left atrium is normal in size which was somewhat against chronic diastolic dysfunction.  It was felt that he had low to normal left ventricular filling pressures on the [facility] echocardiogram.  Right ventricular systolic pressure is only borderline raised at 30 mmHg plus right atrial pressure and the inferior vena cavity is normal in size, indicating that he is not hypervolemic.  No significant valvular heart disease is present.  He does have a sigmoid septum with a basal prominence of 19 mm.  He is on chronic warfarin therapy for paroxysmal atrial fibrillation, but also because he had a DVT and pulmonary embolism.  He has essential hypertension also.  His blood pressure today is well controlled at 134/84.  He is morbidly obese with a BMI of 40.74.  He weighs 300 pounds.  This too of course will add to peripheral edema.  He is taking 60 mg twice a day of furosemide.  Last lipid profile was at [place] on 02/27  showing an LDL of 54, HDL of 43 and triglycerides of 151 with a total cholesterol of 127.  This is excellent and he is on high-intensity statin therapy, being on atorvastatin 40 mg per day.  He is also taking carvedilol 12.5 mg twice a day and is also on lisinopril 40 mg per day and so is on excellent heart failure therapy.  He is not complaining of any increase in peripheral edema.  He is not complaining of any increase in shortness of breath.      IMPRESSION:   1.  Coronary artery disease.  The patient has no symptoms of angina pectoris.   2.  Heart failure with preserved ejection fraction.  The patient has peripheral edema, but his neck veins are not raised.   3.  Crackles at both bases, the cause of which is unclear.   4.  Normally functioning permanent pacemaker in the past.   5.  Paroxysmal atrial fibrillation.  He is in sinus rhythm today.  12-lead electrocardiogram on 02/27 of this year at [place] was also sinus rhythm.   6.  Morbid obesity.   7.  Essential hypertension with good blood pressure control.   8.  Obstructive sleep apnea which is not being treated at present.      PLAN:   1.  Firstly, we will arrange for the patient to have a sleep study, which is necessary for him to obtain CPAP.   2.  We will enroll him in the Device Clinic.   3.  I will obtain a chest x-ray today as he has crackles at both bases, especially on the left side.   4.  I will check a proBNP to see his volume status.   5.  There was a suggestion that this patient might have a variant of hypertrophic cardiomyopathy, though it does appear that all he has is a sigmoid septum.  Because he has a permanent pacemaker in situ, at present, we cannot perform an MRI which would be the ideal study.  However, I do note that protocols are being designed for patients even with permanent pacemakers or AICDs which are not MRI compatible and so I will consult with my MRI colleagues on that.  I will have the patient return to see me with the results of  all of these tests.        It is my pleasure to be involved in the care of this very nice patient.      cc:   Dontrell Gómez MD   [address]  [address]         DAGO JAIMES MD, [facility]             D: 2019   T: 2019   MT: [id number]      Name:     KHUSHBOO NINO   MRN:      [id number]        Account:      [id number]   :      1940           Service Date: 2019      Document: [id number]         Outpatient Encounter Medications as of 2019   Medication Sig Dispense Refill     Acetylcarnitine HCl (ACETYL L-CARNITINE) 500 MG CAPS Take 1 capsule by mouth 2 times daily       albuterol (PROAIR HFA/PROVENTIL HFA/VENTOLIN HFA) 108 (90 Base) MCG/ACT inhaler Inhale 2 puffs into the lungs every 4 hours as needed       aspirin (ASA) 81 MG chewable tablet Take 1 tablet by mouth daily       atorvastatin (LIPITOR) 40 MG tablet Take 1 tablet (40 mg) by mouth every evening 90 tablet 3     B Complex Vitamins (VITAMIN B COMPLEX PO) Take 1 capsule by mouth daily       CALCIUM PO Take 600 mg by mouth daily       carvedilol (COREG) 12.5 MG tablet Take 12.5 mg by mouth 2 times daily       Cholecalciferol (VITAMIN D3) 1000 units CAPS Take 1,000 Units by mouth daily       clonazePAM (KLONOPIN) 0.5 MG tablet Take 0.5 mg by mouth 2 times daily as needed       co-enzyme Q-10 100 MG CAPS capsule Take 100 mg by mouth daily       diltiazem ER COATED BEADS (CARDIZEM CD/CARTIA XT) 300 MG 24 hr capsule Take 300 mg by mouth daily       furosemide (LASIX) 20 MG tablet Take 3 tablets by mouth 2 times daily       Glycopyrrolate-Formoterol (BEVESPI AEROSPHERE) 9-4.8 MCG/ACT oral inhaler Inhale 2 puffs into the lungs 2 times daily 1 Inhaler 3     ipratropium (ATROVENT) 0.03 % nasal spray Inhale 1-2 sprays into the lungs nightly as needed       Ipratropium-Albuterol (COMBIVENT RESPIMAT)  MCG/ACT inhaler Inhale 2 puffs into the lungs 2 times daily 1 Inhaler 3      isosorbide mononitrate (IMDUR) 30 MG 24 hr tablet Take 30 mg by mouth daily       levothyroxine (SYNTHROID/LEVOTHROID) 175 MCG tablet Take 1 tablet by mouth daily Additional 1/2 tablet on Sundays       lisinopril (PRINIVIL/ZESTRIL) 40 MG tablet Take 40 mg by mouth daily       Magnesium 500 MG CAPS Take 1 capsule by mouth daily       metolazone (ZAROXOLYN) 2.5 MG tablet Take 2.5 mg by mouth once a week On Thursday       Multiple vitamin TABS Take 1 tablet by mouth daily       potassium chloride ER (K-TAB/KLOR-CON) 10 MEQ CR tablet Take 10 mEq by mouth 2 times daily       tamsulosin (FLOMAX) 0.4 MG capsule Take 1 capsule by mouth daily       Taurine 500 MG CAPS Take 500 mg by mouth 2 times daily       venlafaxine (EFFEXOR) 37.5 MG tablet Take 37.5 mg by mouth 2 times daily       warfarin (COUMADIN) 5 MG tablet Take 1-1.5 tablets (5-7.5 mg) by mouth daily As directed by INR clinic 135 tablet 0     dexamethasone (DECADRON) 4 MG/ML injection Inject 1 mL (4 mg) as directed once for 1 dose Use 4 mg or dose determined by provider for iontophoresis. 1 mL 0     Glycopyrrolate-Formoterol (BEVESPI AEROSPHERE) 9-4.8 MCG/ACT oral inhaler Inhale 2 puffs into the lungs 2 times daily (Patient not taking: Reported on 4/30/2019) 10.7 g 3     No facility-administered encounter medications on file as of 4/30/2019.        Again, thank you for allowing me to participate in the care of your patient.      Sincerely,    Brent Portillo MD, MD     [facility]

## 2019-04-30 NOTE — PROGRESS NOTES
Service Date: 04/30/2019      REFERRING PHYSICIAN:  Dr. Dontrell Gómez.        HISTORY OF PRESENT ILLNESS:  It is my pleasure to see your patient, Erich Craven, who has moved from [facility].  This is a patient with a past history of coronary artery disease with stenting of the proximal LAD and stenting of the mid and proximal right coronary artery in 2014.  He also carries a diagnosis of obstructive sleep apnea and has had a permanent pacemaker placed in the past.  He also has a history of paroxysmal atrial fibrillation and carries a diagnosis of heart failure with preserved ejection fraction.  His last echocardiogram was performed in February of this year and this showed that the patient has an ejection fraction of 66%.  He has grade 1 diastolic dysfunction.  Interestingly, his left atrium is normal in size which was somewhat against chronic diastolic dysfunction.  It was felt that he had low to normal left ventricular filling pressures on the [facility] echocardiogram.  Right ventricular systolic pressure is only borderline raised at 30 mmHg plus right atrial pressure and the inferior vena cavity is normal in size, indicating that he is not hypervolemic.  No significant valvular heart disease is present.  He does have a sigmoid septum with a basal prominence of 19 mm.  He is on chronic warfarin therapy for paroxysmal atrial fibrillation, but also because he had a DVT and pulmonary embolism.  He has essential hypertension also.  His blood pressure today is well controlled at 134/84.  He is morbidly obese with a BMI of 40.74.  He weighs 300 pounds.  This too of course will add to peripheral edema.  He is taking 60 mg twice a day of furosemide.  Last lipid profile was at [place] on 02/27 showing an LDL of 54, HDL of 43 and triglycerides of 151 with a total cholesterol of 127.  This is excellent and he is on high-intensity statin therapy, being on atorvastatin 40 mg per day.  He is also taking carvedilol 12.5 mg  twice a day and is also on lisinopril 40 mg per day and so is on excellent heart failure therapy.  He is not complaining of any increase in peripheral edema.  He is not complaining of any increase in shortness of breath.      IMPRESSION:   1.  Coronary artery disease.  The patient has no symptoms of angina pectoris.   2.  Heart failure with preserved ejection fraction.  The patient has peripheral edema, but his neck veins are not raised.   3.  Crackles at both bases, the cause of which is unclear.   4.  Normally functioning permanent pacemaker in the past.   5.  Paroxysmal atrial fibrillation.  He is in sinus rhythm today.  12-lead electrocardiogram on 02/27 of this year at [place] was also sinus rhythm.   6.  Morbid obesity.   7.  Essential hypertension with good blood pressure control.   8.  Obstructive sleep apnea which is not being treated at present.      PLAN:   1.  Firstly, we will arrange for the patient to have a sleep study, which is necessary for him to obtain CPAP.   2.  We will enroll him in the Device Clinic.   3.  I will obtain a chest x-ray today as he has crackles at both bases, especially on the left side.   4.  I will check a proBNP to see his volume status.   5.  There was a suggestion that this patient might have a variant of hypertrophic cardiomyopathy, though it does appear that all he has is a sigmoid septum.  Because he has a permanent pacemaker in situ, at present, we cannot perform an MRI which would be the ideal study.  However, I do note that protocols are being designed for patients even with permanent pacemakers or AICDs which are not MRI compatible and so I will consult with my MRI colleagues on that.  I will have the patient return to see me with the results of all of these tests.        It is my pleasure to be involved in the care of this very nice patient.      cc:   Dontrell Gómez MD   [address]  [address]         DAGO  GRAHAM JAIMES MD, [facility]             D: 2019   T: 2019   MT: RIKKI      Name:     KHUSHBOO NINO   MRN:      [id number]        Account:      [id number]   :      1940           Service Date: 2019      Document: [id number]

## 2019-04-30 NOTE — PROGRESS NOTES
HPI and Plan:   See dictation    Orders Placed This Encounter   Procedures     X-ray Chest 2 vws*     N terminal pro BNP outpatient     SLEEP EVALUATION & MANAGEMENT REFERRAL - [facility] [phone]  (Age 18 and up)     Follow-Up with Cardiologist       No orders of the defined types were placed in this encounter.      There are no discontinued medications.      Encounter Diagnoses   Name Primary?     (HFpEF) heart failure with preserved ejection fraction (H) Yes     Paroxysmal atrial fibrillation (H)      RIN (obstructive sleep apnea)      Morbid obesity (H)      Presence of permanent cardiac pacemaker      Coronary artery disease involving native coronary artery of native heart without angina pectoris      Hypertrophic cardiomyopathy (H)        CURRENT MEDICATIONS:  Current Outpatient Medications   Medication Sig Dispense Refill     Acetylcarnitine HCl (ACETYL L-CARNITINE) 500 MG CAPS Take 1 capsule by mouth 2 times daily       albuterol (PROAIR HFA/PROVENTIL HFA/VENTOLIN HFA) 108 (90 Base) MCG/ACT inhaler Inhale 2 puffs into the lungs every 4 hours as needed       aspirin (ASA) 81 MG chewable tablet Take 1 tablet by mouth daily       atorvastatin (LIPITOR) 40 MG tablet Take 1 tablet (40 mg) by mouth every evening 90 tablet 3     B Complex Vitamins (VITAMIN B COMPLEX PO) Take 1 capsule by mouth daily       CALCIUM PO Take 600 mg by mouth daily       carvedilol (COREG) 12.5 MG tablet Take 12.5 mg by mouth 2 times daily       Cholecalciferol (VITAMIN D3) 1000 units CAPS Take 1,000 Units by mouth daily       clonazePAM (KLONOPIN) 0.5 MG tablet Take 0.5 mg by mouth 2 times daily as needed       co-enzyme Q-10 100 MG CAPS capsule Take 100 mg by mouth daily       diltiazem ER COATED BEADS (CARDIZEM CD/CARTIA XT) 300 MG 24 hr capsule Take 300 mg by mouth daily       furosemide (LASIX) 20 MG tablet Take 3 tablets by mouth 2 times daily       Glycopyrrolate-Formoterol (BEVESPI AEROSPHERE)  9-4.8 MCG/ACT oral inhaler Inhale 2 puffs into the lungs 2 times daily 1 Inhaler 3     ipratropium (ATROVENT) 0.03 % nasal spray Inhale 1-2 sprays into the lungs nightly as needed       Ipratropium-Albuterol (COMBIVENT RESPIMAT)  MCG/ACT inhaler Inhale 2 puffs into the lungs 2 times daily 1 Inhaler 3     isosorbide mononitrate (IMDUR) 30 MG 24 hr tablet Take 30 mg by mouth daily       levothyroxine (SYNTHROID/LEVOTHROID) 175 MCG tablet Take 1 tablet by mouth daily Additional 1/2 tablet on Sundays       lisinopril (PRINIVIL/ZESTRIL) 40 MG tablet Take 40 mg by mouth daily       Magnesium 500 MG CAPS Take 1 capsule by mouth daily       metolazone (ZAROXOLYN) 2.5 MG tablet Take 2.5 mg by mouth once a week On Thursday       Multiple vitamin TABS Take 1 tablet by mouth daily       potassium chloride ER (K-TAB/KLOR-CON) 10 MEQ CR tablet Take 10 mEq by mouth 2 times daily       tamsulosin (FLOMAX) 0.4 MG capsule Take 1 capsule by mouth daily       Taurine 500 MG CAPS Take 500 mg by mouth 2 times daily       venlafaxine (EFFEXOR) 37.5 MG tablet Take 37.5 mg by mouth 2 times daily       warfarin (COUMADIN) 5 MG tablet Take 1-1.5 tablets (5-7.5 mg) by mouth daily As directed by INR clinic 135 tablet 0     dexamethasone (DECADRON) 4 MG/ML injection Inject 1 mL (4 mg) as directed once for 1 dose Use 4 mg or dose determined by provider for iontophoresis. 1 mL 0     Glycopyrrolate-Formoterol (BEVESPI AEROSPHERE) 9-4.8 MCG/ACT oral inhaler Inhale 2 puffs into the lungs 2 times daily (Patient not taking: Reported on 4/30/2019) 10.7 g 3       ALLERGIES   No Known Allergies    PAST MEDICAL HISTORY:  Past Medical History:   Diagnosis Date     (HFpEF) heart failure with preserved ejection fraction (H)      BPH with urinary obstruction      CAD (coronary artery disease)     PCI w/ SUMMER in 2014     Cardiac pacemaker in situ      Chronic diastolic heart failure (H)      COPD (chronic obstructive pulmonary disease) (H)      DVT (deep  venous thrombosis) (H)      Dyspnea      Essential hypertension      Fatigue      HLD (hyperlipidemia)      Hypothyroidism      Lymphedema      Obesity, Class III, BMI 40-49.9 (morbid obesity) (H)      RIN (obstructive sleep apnea)     has refused CPAP     Paroxysmal A-fib (H)      PE (pulmonary thromboembolism) (H)        PAST SURGICAL HISTORY:  No past surgical history on file.    FAMILY HISTORY:  No family history on file.    SOCIAL HISTORY:  Social History     Socioeconomic History     Marital status:      Spouse name: None     Number of children: None     Years of education: None     Highest education level: None   Occupational History     None   Social Needs     Financial resource strain: None     Food insecurity:     Worry: None     Inability: None     Transportation needs:     Medical: None     Non-medical: None   Tobacco Use     Smoking status: Former Smoker     Smokeless tobacco: Never Used     Tobacco comment: quit in 1989   Substance and Sexual Activity     Alcohol use: No     Frequency: Never     Comment: quit in 1989     Drug use: No     Sexual activity: Not Currently     Partners: Female   Lifestyle     Physical activity:     Days per week: None     Minutes per session: None     Stress: None   Relationships     Social connections:     Talks on phone: None     Gets together: None     Attends [employer] service: None     Active member of club or organization: None     Attends meetings of clubs or organizations: None     Relationship status: None     Intimate partner violence:     Fear of current or ex partner: None     Emotionally abused: None     Physically abused: None     Forced sexual activity: None   Other Topics Concern     None   Social History Narrative     None       Review of Systems:  Skin:  Negative       Eyes:  Negative      ENT:  Negative      Respiratory:  Positive for dyspnea on exertion;CPAP;sleep apnea     Cardiovascular:    Positive for;dizziness(in the mornings )     Gastroenterology: Negative      Genitourinary:  Negative      Musculoskeletal:  Positive for arthritis(patient in PT for both shoulders)    Neurologic:  Negative      Psychiatric:  Negative      Heme/Lymph/Imm:  Negative      Endocrine:  Negative        Physical Exam:  Vitals: /84   Pulse 84   Ht 1.829 m (6')   Wt 136.3 kg (300 lb 6.4 oz)   SpO2 93%   BMI 40.74 kg/m      Constitutional:  cooperative, alert and oriented, well developed, well nourished, in no acute distress        Skin:  warm and dry to the touch venous stasis changes        Head:  no masses or lesions        Eyes:  pupils equal and round        Lymph:No Cervical lymphadenopathy present     ENT:  no pallor or cyanosis        Neck:  carotid pulses are full and equal bilaterally, JVP normal, no carotid bruit        Respiratory:  normal breath sounds, clear to auscultation, normal A-P diameter, normal symmetry, normal respiratory excursion, no use of accessory muscles         Cardiac: regular rhythm, normal S1/S2, no S3 or S4, apical impulse not displaced, no murmurs, gallops or rubs   distant heart sounds            pulses full and equal, no bruits auscultated                                        GI:    obese      Extremities and Muscular Skeletal:  no deformities, clubbing, cyanosis, erythema observed;no edema              Neurological:  no gross motor deficits;affect appropriate        Psych:  Alert and Oriented x 3        CC  No referring provider defined for this encounter.

## 2019-04-30 NOTE — LETTER
4/30/2019    Dontrell Gómez MD  [address]  [place] [address]    RE: Erich Herber       Dear Colleague,    I had the pleasure of seeing Erich Herber in the [facility].    HPI and Plan:   See dictation    Orders Placed This Encounter   Procedures     X-ray Chest 2 vws*     N terminal pro BNP outpatient     SLEEP EVALUATION & MANAGEMENT REFERRAL - [facility] [phone]  (Age 18 and up)     Follow-Up with Cardiologist       No orders of the defined types were placed in this encounter.      There are no discontinued medications.      Encounter Diagnoses   Name Primary?     (HFpEF) heart failure with preserved ejection fraction (H) Yes     Paroxysmal atrial fibrillation (H)      RIN (obstructive sleep apnea)      Morbid obesity (H)      Presence of permanent cardiac pacemaker      Coronary artery disease involving native coronary artery of native heart without angina pectoris      Hypertrophic cardiomyopathy (H)        CURRENT MEDICATIONS:  Current Outpatient Medications   Medication Sig Dispense Refill     Acetylcarnitine HCl (ACETYL L-CARNITINE) 500 MG CAPS Take 1 capsule by mouth 2 times daily       albuterol (PROAIR HFA/PROVENTIL HFA/VENTOLIN HFA) 108 (90 Base) MCG/ACT inhaler Inhale 2 puffs into the lungs every 4 hours as needed       aspirin (ASA) 81 MG chewable tablet Take 1 tablet by mouth daily       atorvastatin (LIPITOR) 40 MG tablet Take 1 tablet (40 mg) by mouth every evening 90 tablet 3     B Complex Vitamins (VITAMIN B COMPLEX PO) Take 1 capsule by mouth daily       CALCIUM PO Take 600 mg by mouth daily       carvedilol (COREG) 12.5 MG tablet Take 12.5 mg by mouth 2 times daily       Cholecalciferol (VITAMIN D3) 1000 units CAPS Take 1,000 Units by mouth daily       clonazePAM (KLONOPIN) 0.5 MG tablet Take 0.5 mg by mouth 2 times daily as needed       co-enzyme Q-10 100 MG CAPS capsule Take 100 mg by mouth daily        diltiazem ER COATED BEADS (CARDIZEM CD/CARTIA XT) 300 MG 24 hr capsule Take 300 mg by mouth daily       furosemide (LASIX) 20 MG tablet Take 3 tablets by mouth 2 times daily       Glycopyrrolate-Formoterol (BEVESPI AEROSPHERE) 9-4.8 MCG/ACT oral inhaler Inhale 2 puffs into the lungs 2 times daily 1 Inhaler 3     ipratropium (ATROVENT) 0.03 % nasal spray Inhale 1-2 sprays into the lungs nightly as needed       Ipratropium-Albuterol (COMBIVENT RESPIMAT)  MCG/ACT inhaler Inhale 2 puffs into the lungs 2 times daily 1 Inhaler 3     isosorbide mononitrate (IMDUR) 30 MG 24 hr tablet Take 30 mg by mouth daily       levothyroxine (SYNTHROID/LEVOTHROID) 175 MCG tablet Take 1 tablet by mouth daily Additional 1/2 tablet on Sundays       lisinopril (PRINIVIL/ZESTRIL) 40 MG tablet Take 40 mg by mouth daily       Magnesium 500 MG CAPS Take 1 capsule by mouth daily       metolazone (ZAROXOLYN) 2.5 MG tablet Take 2.5 mg by mouth once a week On Thursday       Multiple vitamin TABS Take 1 tablet by mouth daily       potassium chloride ER (K-TAB/KLOR-CON) 10 MEQ CR tablet Take 10 mEq by mouth 2 times daily       tamsulosin (FLOMAX) 0.4 MG capsule Take 1 capsule by mouth daily       Taurine 500 MG CAPS Take 500 mg by mouth 2 times daily       venlafaxine (EFFEXOR) 37.5 MG tablet Take 37.5 mg by mouth 2 times daily       warfarin (COUMADIN) 5 MG tablet Take 1-1.5 tablets (5-7.5 mg) by mouth daily As directed by INR clinic 135 tablet 0     dexamethasone (DECADRON) 4 MG/ML injection Inject 1 mL (4 mg) as directed once for 1 dose Use 4 mg or dose determined by provider for iontophoresis. 1 mL 0     Glycopyrrolate-Formoterol (BEVESPI AEROSPHERE) 9-4.8 MCG/ACT oral inhaler Inhale 2 puffs into the lungs 2 times daily (Patient not taking: Reported on 4/30/2019) 10.7 g 3       ALLERGIES   No Known Allergies    PAST MEDICAL HISTORY:  Past Medical History:   Diagnosis Date     (HFpEF) heart failure with preserved ejection fraction (H)       BPH with urinary obstruction      CAD (coronary artery disease)     PCI w/ SUMMER in 2014     Cardiac pacemaker in situ      Chronic diastolic heart failure (H)      COPD (chronic obstructive pulmonary disease) (H)      DVT (deep venous thrombosis) (H)      Dyspnea      Essential hypertension      Fatigue      HLD (hyperlipidemia)      Hypothyroidism      Lymphedema      Obesity, Class III, BMI 40-49.9 (morbid obesity) (H)      RIN (obstructive sleep apnea)     has refused CPAP     Paroxysmal A-fib (H)      PE (pulmonary thromboembolism) (H)        PAST SURGICAL HISTORY:  No past surgical history on file.    FAMILY HISTORY:  No family history on file.    SOCIAL HISTORY:  Social History     Socioeconomic History     Marital status:      Spouse name: None     Number of children: None     Years of education: None     Highest education level: None   Occupational History     None   Social Needs     Financial resource strain: None     Food insecurity:     Worry: None     Inability: None     Transportation needs:     Medical: None     Non-medical: None   Tobacco Use     Smoking status: Former Smoker     Smokeless tobacco: Never Used     Tobacco comment: quit in 1989   Substance and Sexual Activity     Alcohol use: No     Frequency: Never     Comment: quit in 1989     Drug use: No     Sexual activity: Not Currently     Partners: Female   Lifestyle     Physical activity:     Days per week: None     Minutes per session: None     Stress: None   Relationships     Social connections:     Talks on phone: None     Gets together: None     Attends [employer] service: None     Active member of club or organization: None     Attends meetings of clubs or organizations: None     Relationship status: None     Intimate partner violence:     Fear of current or ex partner: None     Emotionally abused: None     Physically abused: None     Forced sexual activity: None   Other Topics Concern     None   Social History Narrative     None        Review of Systems:  Skin:  Negative       Eyes:  Negative      ENT:  Negative      Respiratory:  Positive for dyspnea on exertion;CPAP;sleep apnea     Cardiovascular:    Positive for;dizziness(in the mornings )    Gastroenterology: Negative      Genitourinary:  Negative      Musculoskeletal:  Positive for arthritis(patient in PT for both shoulders)    Neurologic:  Negative      Psychiatric:  Negative      Heme/Lymph/Imm:  Negative      Endocrine:  Negative        Physical Exam:  Vitals: /84   Pulse 84   Ht 1.829 m (6')   Wt 136.3 kg (300 lb 6.4 oz)   SpO2 93%   BMI 40.74 kg/m       Constitutional:  cooperative, alert and oriented, well developed, well nourished, in no acute distress        Skin:  warm and dry to the touch venous stasis changes        Head:  no masses or lesions        Eyes:  pupils equal and round        Lymph:No Cervical lymphadenopathy present     ENT:  no pallor or cyanosis        Neck:  carotid pulses are full and equal bilaterally, JVP normal, no carotid bruit        Respiratory:  normal breath sounds, clear to auscultation, normal A-P diameter, normal symmetry, normal respiratory excursion, no use of accessory muscles         Cardiac: regular rhythm, normal S1/S2, no S3 or S4, apical impulse not displaced, no murmurs, gallops or rubs   distant heart sounds            pulses full and equal, no bruits auscultated                                        GI:    obese      Extremities and Muscular Skeletal:  no deformities, clubbing, cyanosis, erythema observed;no edema              Neurological:  no gross motor deficits;affect appropriate        Psych:  Alert and Oriented x 3        CC  No referring provider defined for this encounter.                Thank you for allowing me to participate in the care of your patient.      Sincerely,     Brent Portillo MD, MD     [facility]    cc:   No referring provider defined for this  encounter.

## 2019-04-30 NOTE — LETTER
May 15, 2019     TO: Erich Craven   [address]  [facility] [facility] [place] [address]     Dear Mr. Craven,  The results of your recent chest X-ray & lab .    Results for orders placed or performed during the hospital encounter of 04/30/19   X-ray Chest 2 vws*    Narrative    CHEST TWO VIEWS  4/30/2019 10:04 AM     HISTORY: 78-year-old patient with heart failure.    COMPARISON: None       Impression    IMPRESSION: Heart size is normal. Elevation of the right  hemidiaphragm. No pleural effusion, pneumothorax, or abnormal area of  consolidation. Dual-lead pacemaker in the left hemithorax.    SHU GANDHI MD     N-Terminal Pro Bnp 0 - 450 pg/mL 71      Your test results fall within the expected range(s) or remain unchanged from previous results.  Please continue with current treatment plan. Please call [phone] with questions or concerns.     Sincerely,  [facility]

## 2019-05-02 ENCOUNTER — THERAPY VISIT (OUTPATIENT)
Dept: PHYSICAL THERAPY | Facility: CLINIC | Age: 79
End: 2019-05-02
Payer: MEDICARE

## 2019-05-02 DIAGNOSIS — M75.41 IMPINGEMENT SYNDROME, SHOULDER, RIGHT: ICD-10-CM

## 2019-05-02 DIAGNOSIS — G89.29 CHRONIC RIGHT SHOULDER PAIN: ICD-10-CM

## 2019-05-02 DIAGNOSIS — M25.511 CHRONIC RIGHT SHOULDER PAIN: ICD-10-CM

## 2019-05-02 DIAGNOSIS — I50.32 CHRONIC DIASTOLIC HEART FAILURE (H): Primary | ICD-10-CM

## 2019-05-02 PROCEDURE — 97112 NEUROMUSCULAR REEDUCATION: CPT | Mod: GP | Performed by: PHYSICAL THERAPIST

## 2019-05-02 PROCEDURE — 97110 THERAPEUTIC EXERCISES: CPT | Mod: GP | Performed by: PHYSICAL THERAPIST

## 2019-05-02 NOTE — TELEPHONE ENCOUNTER
"furosemide (LASIX) 20 MG tablet   11/8/2018  --   Sig - Route: Take 3 tablets by mouth 2 times daily - Oral   Class: Historical         Last Written Prescription Date:  Unknown  Last Fill Quantity: Unknown,  # refills: Unknown   Last office visit: 4/11/2019 with prescribing provider:     Future Office Visit:   Next 5 appointments (look out 90 days)    Jul 10, 2019  8:15 AM CDT  Return Visit with Brent Portillo MD  [facility] ([facility]) [address] [place] [id number] [phone] OPT 2         Requested Prescriptions   Pending Prescriptions Disp Refills     furosemide (LASIX) 20 MG tablet [Pharmacy Med Name: FUROSEMIDE 20 MG TABLET] 540 tablet 0     Sig: TAKE 3 TABLETS BY MOUTH TWICE DAILY.       Diuretics (Including Combos) Protocol Failed - 5/2/2019  2:15 PM        Failed - Normal serum sodium on file in past 12 months     No lab results found.           Passed - Blood pressure under 140/90 in past 12 months     BP Readings from Last 3 Encounters:   04/30/19 134/84   04/11/19 117/80   03/26/19 117/70                 Passed - Recent (12 mo) or future (30 days) visit within the authorizing provider's specialty     Patient had office visit in the last 12 months or has a visit in the next 30 days with authorizing provider or within the authorizing provider's specialty.  See \"Patient Info\" tab in inbasket, or \"Choose Columns\" in Meds & Orders section of the refill encounter.              Passed - Medication is active on med list        Passed - Patient is age 18 or older        Passed - Normal serum creatinine on file in past 12 months     Recent Labs   Lab Test 02/27/19   CR 0.94              Passed - Normal serum potassium on file in past 12 months     Recent Labs   Lab Test 02/27/19   POTASSIUM 4.0                      "

## 2019-05-03 NOTE — TELEPHONE ENCOUNTER
Routing refill request to provider for review/approval because:  Medication is reported/historical  Allie PRICE RN

## 2019-05-06 RX ORDER — FUROSEMIDE 20 MG
TABLET ORAL
Qty: 540 TABLET | Refills: 0 | Status: SHIPPED | OUTPATIENT
Start: 2019-05-06 | End: 2019-11-12

## 2019-05-07 LAB
MDC_IDC_LEAD_IMPLANT_DT: NORMAL
MDC_IDC_LEAD_IMPLANT_DT: NORMAL
MDC_IDC_LEAD_LOCATION: NORMAL
MDC_IDC_LEAD_LOCATION: NORMAL
MDC_IDC_LEAD_LOCATION_DETAIL_1: NORMAL
MDC_IDC_LEAD_LOCATION_DETAIL_1: NORMAL
MDC_IDC_LEAD_MFG: NORMAL
MDC_IDC_LEAD_MFG: NORMAL
MDC_IDC_LEAD_MODEL: NORMAL
MDC_IDC_LEAD_MODEL: NORMAL
MDC_IDC_LEAD_POLARITY_TYPE: NORMAL
MDC_IDC_LEAD_POLARITY_TYPE: NORMAL
MDC_IDC_LEAD_SERIAL: NORMAL
MDC_IDC_LEAD_SERIAL: NORMAL
MDC_IDC_MSMT_BATTERY_REMAINING_LONGEVITY: 48 MO
MDC_IDC_MSMT_LEADCHNL_RA_IMPEDANCE_VALUE: 505 OHM
MDC_IDC_MSMT_LEADCHNL_RA_PACING_THRESHOLD_AMPLITUDE: 0.4 V
MDC_IDC_MSMT_LEADCHNL_RA_PACING_THRESHOLD_PULSEWIDTH: 0.4 MS
MDC_IDC_MSMT_LEADCHNL_RA_SENSING_INTR_AMPL: 12.2 MV
MDC_IDC_MSMT_LEADCHNL_RV_IMPEDANCE_VALUE: 413 OHM
MDC_IDC_MSMT_LEADCHNL_RV_PACING_THRESHOLD_AMPLITUDE: 1.2 V
MDC_IDC_MSMT_LEADCHNL_RV_PACING_THRESHOLD_PULSEWIDTH: 0.4 MS
MDC_IDC_MSMT_LEADCHNL_RV_SENSING_INTR_AMPL: 5.3 MV
MDC_IDC_PG_IMPLANT_DTM: NORMAL
MDC_IDC_PG_MFG: NORMAL
MDC_IDC_PG_MODEL: NORMAL
MDC_IDC_PG_SERIAL: NORMAL
MDC_IDC_PG_TYPE: NORMAL
MDC_IDC_SESS_CLINIC_NAME: NORMAL
MDC_IDC_SESS_DTM: NORMAL
MDC_IDC_SESS_TYPE: NORMAL
MDC_IDC_SET_BRADY_AT_MODE_SWITCH_MODE: NORMAL
MDC_IDC_SET_BRADY_AT_MODE_SWITCH_RATE: 150 {BEATS}/MIN
MDC_IDC_SET_BRADY_LOWRATE: 60 {BEATS}/MIN
MDC_IDC_SET_BRADY_MAX_SENSOR_RATE: 130 {BEATS}/MIN
MDC_IDC_SET_BRADY_MAX_TRACKING_RATE: 130 {BEATS}/MIN
MDC_IDC_SET_BRADY_MODE: NORMAL
MDC_IDC_SET_BRADY_PAV_DELAY_HIGH: 220 MS
MDC_IDC_SET_BRADY_PAV_DELAY_LOW: 250 MS
MDC_IDC_SET_BRADY_SAV_DELAY_HIGH: 220 MS
MDC_IDC_SET_BRADY_SAV_DELAY_LOW: 250 MS
MDC_IDC_SET_LEADCHNL_RA_PACING_AMPLITUDE: 2 V
MDC_IDC_SET_LEADCHNL_RA_PACING_ANODE_ELECTRODE_1: NORMAL
MDC_IDC_SET_LEADCHNL_RA_PACING_ANODE_LOCATION_1: NORMAL
MDC_IDC_SET_LEADCHNL_RA_PACING_CAPTURE_MODE: NORMAL
MDC_IDC_SET_LEADCHNL_RA_PACING_CATHODE_ELECTRODE_1: NORMAL
MDC_IDC_SET_LEADCHNL_RA_PACING_CATHODE_LOCATION_1: NORMAL
MDC_IDC_SET_LEADCHNL_RA_PACING_POLARITY: NORMAL
MDC_IDC_SET_LEADCHNL_RA_PACING_PULSEWIDTH: 0.4 MS
MDC_IDC_SET_LEADCHNL_RA_SENSING_ADAPTATION_MODE: NORMAL
MDC_IDC_SET_LEADCHNL_RA_SENSING_ANODE_ELECTRODE_1: NORMAL
MDC_IDC_SET_LEADCHNL_RA_SENSING_ANODE_LOCATION_1: NORMAL
MDC_IDC_SET_LEADCHNL_RA_SENSING_CATHODE_ELECTRODE_1: NORMAL
MDC_IDC_SET_LEADCHNL_RA_SENSING_CATHODE_LOCATION_1: NORMAL
MDC_IDC_SET_LEADCHNL_RA_SENSING_POLARITY: NORMAL
MDC_IDC_SET_LEADCHNL_RA_SENSING_SENSITIVITY: 0.5 MV
MDC_IDC_SET_LEADCHNL_RV_PACING_AMPLITUDE: 1.9 V
MDC_IDC_SET_LEADCHNL_RV_PACING_ANODE_ELECTRODE_1: NORMAL
MDC_IDC_SET_LEADCHNL_RV_PACING_ANODE_LOCATION_1: NORMAL
MDC_IDC_SET_LEADCHNL_RV_PACING_CAPTURE_MODE: NORMAL
MDC_IDC_SET_LEADCHNL_RV_PACING_CATHODE_ELECTRODE_1: NORMAL
MDC_IDC_SET_LEADCHNL_RV_PACING_CATHODE_LOCATION_1: NORMAL
MDC_IDC_SET_LEADCHNL_RV_PACING_POLARITY: NORMAL
MDC_IDC_SET_LEADCHNL_RV_PACING_PULSEWIDTH: 0.4 MS
MDC_IDC_SET_LEADCHNL_RV_SENSING_ADAPTATION_MODE: NORMAL
MDC_IDC_SET_LEADCHNL_RV_SENSING_ANODE_ELECTRODE_1: NORMAL
MDC_IDC_SET_LEADCHNL_RV_SENSING_ANODE_LOCATION_1: NORMAL
MDC_IDC_SET_LEADCHNL_RV_SENSING_CATHODE_ELECTRODE_1: NORMAL
MDC_IDC_SET_LEADCHNL_RV_SENSING_CATHODE_LOCATION_1: NORMAL
MDC_IDC_SET_LEADCHNL_RV_SENSING_POLARITY: NORMAL
MDC_IDC_SET_LEADCHNL_RV_SENSING_SENSITIVITY: 1 MV
MDC_IDC_SET_ZONE_DETECTION_INTERVAL: 375 MS
MDC_IDC_SET_ZONE_TYPE: NORMAL
MDC_IDC_SET_ZONE_VENDOR_TYPE: NORMAL
MDC_IDC_STAT_EPISODE_RECENT_COUNT: 31
MDC_IDC_STAT_EPISODE_RECENT_COUNT_DTM_END: NORMAL
MDC_IDC_STAT_EPISODE_RECENT_COUNT_DTM_START: NORMAL
MDC_IDC_STAT_EPISODE_TOTAL_COUNT: 163
MDC_IDC_STAT_EPISODE_TOTAL_COUNT_DTM_END: NORMAL
MDC_IDC_STAT_EPISODE_TYPE: NORMAL
MDC_IDC_STAT_EPISODE_TYPE: NORMAL
MDC_IDC_STAT_EPISODE_VENDOR_TYPE: NORMAL
MDC_IDC_STAT_EPISODE_VENDOR_TYPE: NORMAL

## 2019-05-15 DIAGNOSIS — F43.21 ADJUSTMENT DISORDER WITH DEPRESSED MOOD: Primary | ICD-10-CM

## 2019-05-15 NOTE — TELEPHONE ENCOUNTER
Reason for Call:  Other prescription    Detailed comments: Patient is requesting refill for his Clonazepam medication. He would like it ready by Friday. Please give patient a call if you have any other questions. Thank you.    Phone Number Patient can be reached at: Home number on file [phone] (home)    Best Time: Anytime.    Can we leave a detailed message on this number? YES    Call taken on 5/15/2019 at 6:11 PM by Travis Stafford

## 2019-05-16 ENCOUNTER — ANTICOAGULATION THERAPY VISIT (OUTPATIENT)
Dept: NURSING | Facility: CLINIC | Age: 79
End: 2019-05-16
Payer: MEDICARE

## 2019-05-16 LAB — INR POINT OF CARE: 4 (ref 0.86–1.14)

## 2019-05-16 PROCEDURE — 36416 COLLJ CAPILLARY BLOOD SPEC: CPT

## 2019-05-16 PROCEDURE — 99207 ZZC NO CHARGE NURSE ONLY: CPT

## 2019-05-16 PROCEDURE — 85610 PROTHROMBIN TIME: CPT | Mod: QW

## 2019-05-16 RX ORDER — CLONAZEPAM 0.5 MG/1
0.5 TABLET ORAL 2 TIMES DAILY PRN
Qty: 60 TABLET | Refills: 0 | Status: SHIPPED | OUTPATIENT
Start: 2019-05-16 | End: 2019-06-12

## 2019-05-16 NOTE — TELEPHONE ENCOUNTER
Historical on med list as Clonazepam 0.5mg 1/2 tablet BID as needed    NOT previously sent in Epic      checked - last filled Rx 4/19/19 #60 from [place] provider Josafat Valadez    Will route to PCP to review    Keisha MARTIN RN

## 2019-05-16 NOTE — TELEPHONE ENCOUNTER
Clonazepam      Last Written Prescription Date:  Patient reported  Last Fill Quantity: ,   # refills:   Last Office Visit: 04/11/19  Future Office visit:    Next 5 appointments (look out 90 days)    May 28, 2019  1:30 PM CDT  Office Visit with Dontrell Gómez MD  [facility] [address] [address]  [phone]   Jul 10, 2019  8:15 AM CDT  Return Visit with Bernt Portillo MD  [facility] ([facility]) [address] [address]  [phone] OPT 2           Routing refill request to provider for review/approval because:  Drug not on the [facility], [facility] or [facility] refill protocol or controlled substance    Rocio Suazo MA

## 2019-05-16 NOTE — PROGRESS NOTES
ANTICOAGULATION FOLLOW-UP CLINIC VISIT    Patient Name:  Erich Craven  Date:  2019  Contact Type:  Face to Face    SUBJECTIVE:  Patient Findings     Comments:   Patient denies any identifiable changes that caused the supratherapeutic INR.   Patient reports already took this morning's Warfarin dose.            Clinical Outcomes     Comments:   Patient denies any identifiable changes that caused the supratherapeutic INR.   Patient reports already took this morning's Warfarin dose.               OBJECTIVE    INR Protime   Date Value Ref Range Status   2019 4.0 (A) 0.86 - 1.14 Final       ASSESSMENT / PLAN  INR assessment SUPRA    Recheck INR In: 8 DAYS    INR Location Clinic      Anticoagulation Summary  As of 2019    INR goal:   2.0-3.0   TTR:   42.4 % (1.1 mo)   INR used for dosin.0! (2019)   Warfarin maintenance plan:   7.5 mg (5 mg x 1.5) every Mon, Wed, Fri; 5 mg (5 mg x 1) all other days   Full warfarin instructions:   : Hold; Otherwise 7.5 mg every Mon, Wed, Fri; 5 mg all other days   Weekly warfarin total:   42.5 mg   Plan last modified:   Lanny Spivey RN (2019)   Next INR check:   2019   Target end date:            Anticoagulation Episode Summary     INR check location:       Preferred lab:       Send INR reminders to:    ANTICOAGULATION    Comments:         Anticoagulation Care Providers     Provider Role Specialty Phone number    Dontrell Gómez MD Referring Internal Medicine [phone]            See the Encounter Report to view Anticoagulation Flowsheet and Dosing Calendar (Go to Encounters tab in chart review, and find the Anticoagulation Therapy Visit)    Dosage adjustment made based on physician directed care plan.    Some signs and symptoms of bleeding include: Nose bleed or cut that does not stop bleeding in 10 minutes, bleeding of the gums, vomiting (will look like coffee grounds) or coughing up blood, unusual, easy or large areas of bruising, increased  or unexpected vaginal bleeding or increased menstrual flow, red or black stools, red or orange urine, prolonged or severe headache, pale skin, unusual or constant tiredness.  If you have these please call 911 or seek medical care immediately.       Lanny Spivey RN

## 2019-05-21 ENCOUNTER — OFFICE VISIT (OUTPATIENT)
Dept: FAMILY MEDICINE | Facility: CLINIC | Age: 79
End: 2019-05-21
Payer: MEDICARE

## 2019-05-21 VITALS
HEART RATE: 83 BPM | SYSTOLIC BLOOD PRESSURE: 122 MMHG | BODY MASS INDEX: 39.82 KG/M2 | OXYGEN SATURATION: 94 % | WEIGHT: 294 LBS | DIASTOLIC BLOOD PRESSURE: 74 MMHG | TEMPERATURE: 98.6 F | HEIGHT: 72 IN

## 2019-05-21 DIAGNOSIS — E66.01 MORBID OBESITY (H): Primary | ICD-10-CM

## 2019-05-21 DIAGNOSIS — I89.0 LYMPHEDEMA: ICD-10-CM

## 2019-05-21 DIAGNOSIS — G44.209 MUSCLE TENSION HEADACHE: ICD-10-CM

## 2019-05-21 DIAGNOSIS — I10 ESSENTIAL HYPERTENSION: ICD-10-CM

## 2019-05-21 DIAGNOSIS — G47.33 OSA (OBSTRUCTIVE SLEEP APNEA): ICD-10-CM

## 2019-05-21 DIAGNOSIS — M25.511 CHRONIC RIGHT SHOULDER PAIN: ICD-10-CM

## 2019-05-21 DIAGNOSIS — N40.0 PROSTATISM: ICD-10-CM

## 2019-05-21 DIAGNOSIS — I50.32 CHRONIC DIASTOLIC HEART FAILURE (H): ICD-10-CM

## 2019-05-21 DIAGNOSIS — G89.29 CHRONIC RIGHT SHOULDER PAIN: ICD-10-CM

## 2019-05-21 DIAGNOSIS — J41.0 SIMPLE CHRONIC BRONCHITIS (H): ICD-10-CM

## 2019-05-21 PROCEDURE — 99214 OFFICE O/P EST MOD 30 MIN: CPT | Performed by: INTERNAL MEDICINE

## 2019-05-21 RX ORDER — TAMSULOSIN HYDROCHLORIDE 0.4 MG/1
0.4 CAPSULE ORAL DAILY
Qty: 90 CAPSULE | Refills: 3 | Status: SHIPPED | OUTPATIENT
Start: 2019-05-21 | End: 2020-02-10

## 2019-05-21 RX ORDER — METOLAZONE 2.5 MG/1
2.5 TABLET ORAL WEEKLY
Qty: 30 TABLET | Refills: 11 | Status: SHIPPED | OUTPATIENT
Start: 2019-05-21 | End: 2019-10-07

## 2019-05-21 RX ORDER — ISOSORBIDE MONONITRATE 30 MG/1
30 TABLET, EXTENDED RELEASE ORAL DAILY
Qty: 90 TABLET | Refills: 3 | Status: SHIPPED | OUTPATIENT
Start: 2019-05-21 | End: 2020-02-10

## 2019-05-21 ASSESSMENT — PATIENT HEALTH QUESTIONNAIRE - PHQ9: SUM OF ALL RESPONSES TO PHQ QUESTIONS 1-9: 0

## 2019-05-21 ASSESSMENT — MIFFLIN-ST. JEOR: SCORE: 2081.58

## 2019-05-21 NOTE — PROGRESS NOTES
Subjective     Erich Craven is a 79 year old male who presents to clinic today for the following health issues:    HPI   Headaches      Duration: ongoing on/off worsening within past month    Description  Location: bilateral in the occipital area   Character: sharp pain, waxing waning  Frequency:  2-3 times weekly   Duration:  2-3 hours comes and goes    Intensity:  severe, 8/10    Accompanying signs and symptoms:    Precipitating or Alleviating factors:  Nausea/vomiting: sometimes nausea   Dizziness: occasionally  Weakness or numbness: numbness noted in fingers and tingling in legs   Visual changes: watery eyes  Fever: no   Sinus or URI symptoms no     History  Head trauma: no   Family history of migraines: YES- Mother had HX of migraines and HX of brain tumor.   Previous tests for headaches: YES- CT was done   Neurologist evaluations: no   Able to do daily activities when headache present: YES- can do them, but hinders activity completion  Wake with headaches: no   Daily pain medication use: no  Any changes in: adjusted to [place] within past 6 months so not really noted.    Precipitating or Alleviating factors (light/sound/sleep/caffeine): unsure    Therapies tried and outcome: Tylenol    Outcome -not effective  Frequent/daily pain medication use: no     Has been Exercising doing machines for his back and walking.    He wonders about shingles shots because he had an episodes of shingles within the last year or so.       Current Outpatient Medications   Medication Sig Dispense Refill     Acetylcarnitine HCl (ACETYL L-CARNITINE) 500 MG CAPS Take 1 capsule by mouth 2 times daily       albuterol (PROAIR HFA/PROVENTIL HFA/VENTOLIN HFA) 108 (90 Base) MCG/ACT inhaler Inhale 2 puffs into the lungs every 4 hours as needed       aspirin (ASA) 81 MG chewable tablet Take 1 tablet by mouth daily       atorvastatin (LIPITOR) 40 MG tablet Take 1 tablet (40 mg) by mouth every evening 90 tablet 3     B Complex Vitamins (VITAMIN B  COMPLEX PO) Take 1 capsule by mouth daily       CALCIUM PO Take 600 mg by mouth daily       carvedilol (COREG) 12.5 MG tablet Take 12.5 mg by mouth 2 times daily       Cholecalciferol (VITAMIN D3) 1000 units CAPS Take 1,000 Units by mouth daily       clonazePAM (KLONOPIN) 0.5 MG tablet Take 1 tablet (0.5 mg) by mouth 2 times daily as needed 60 tablet 0     co-enzyme Q-10 100 MG CAPS capsule Take 100 mg by mouth daily       dexamethasone (DECADRON) 4 MG/ML injection Inject 1 mL (4 mg) as directed once for 1 dose Use 4 mg or dose determined by provider for iontophoresis. 1 mL 0     diltiazem ER COATED BEADS (CARDIZEM CD/CARTIA XT) 300 MG 24 hr capsule Take 300 mg by mouth daily       furosemide (LASIX) 20 MG tablet TAKE 3 TABLETS BY MOUTH TWICE DAILY. 540 tablet 0     Glycopyrrolate-Formoterol (BEVESPI AEROSPHERE) 9-4.8 MCG/ACT oral inhaler Inhale 2 puffs into the lungs 2 times daily 1 Inhaler 3     Glycopyrrolate-Formoterol (BEVESPI AEROSPHERE) 9-4.8 MCG/ACT oral inhaler Inhale 2 puffs into the lungs 2 times daily 10.7 g 3     ipratropium (ATROVENT) 0.03 % nasal spray Inhale 1-2 sprays into the lungs nightly as needed       Ipratropium-Albuterol (COMBIVENT RESPIMAT)  MCG/ACT inhaler Inhale 2 puffs into the lungs 2 times daily 1 Inhaler 3     isosorbide mononitrate (IMDUR) 30 MG 24 hr tablet Take 30 mg by mouth daily       levothyroxine (SYNTHROID/LEVOTHROID) 175 MCG tablet Take 1 tablet by mouth daily Additional 1/2 tablet on Sundays       lisinopril (PRINIVIL/ZESTRIL) 40 MG tablet Take 40 mg by mouth daily       Magnesium 500 MG CAPS Take 1 capsule by mouth daily       metolazone (ZAROXOLYN) 2.5 MG tablet Take 2.5 mg by mouth once a week On Thursday       Multiple vitamin TABS Take 1 tablet by mouth daily       potassium chloride ER (K-TAB/KLOR-CON) 10 MEQ CR tablet Take 10 mEq by mouth 2 times daily       tamsulosin (FLOMAX) 0.4 MG capsule Take 1 capsule by mouth daily       Taurine 500 MG CAPS Take 500 mg by  mouth 2 times daily       venlafaxine (EFFEXOR) 37.5 MG tablet Take 37.5 mg by mouth 2 times daily       warfarin (COUMADIN) 5 MG tablet Take 1-1.5 tablets (5-7.5 mg) by mouth daily As directed by INR clinic 135 tablet 0     No Known Allergies    Reviewed and updated as needed this visit by Provider         Review of Systems   Per HPI, see above.       This document serves as a record of the services and decisions personally performed and made by Dontrell Gómez MD. It was created on his behalf by Jillian Romero, a trained medical scribe. The creation of this document is based on the provider's statements to the medical scribe.  Jillian Romero May 21, 2019 5:15 PM      Objective    Ht 1.829 m (6')   Wt 133.4 kg (294 lb)   BMI 39.87 kg/m    Body mass index is 39.87 kg/m .   /74 (BP Location: Left arm, Patient Position: Sitting, Cuff Size: Adult Large)   Pulse 83   Temp 98.6  F (37  C) (Oral)   Ht 1.829 m (6')   Wt 133.4 kg (294 lb)   SpO2 94%   BMI 39.87 kg/m      Physical Exam   Neck was supple without adenopathy or thyromegaly his carotids were normal without bruits  Chest clear to auscultation and percussion  Cardiovascular S1 and S2 are physiologic without murmurs or gallops  Abdomen bowel sounds were normal.  There is no palpable mass or organomegaly, obese  Extremities nontender with edema, 1-2 + pretibial edema with chronic venostasis changes   Pulses pedal pulses are as described otherwise his pulses are bilaterally symmetrical throughout without bruits  Skin without significant abnormality  Neck has reduced rotation, left greater than right, some slight tenderness of left greater than right origins of trapezious muscles  Spine and Paraspinous muscles  Non-tender                Assessment & Plan     Erich was seen today for recheck.    Diagnoses and all orders for this visit:    Morbid obesity (H)  Ongoing issues relating to inability to lose weight.  Reviewed initial of beginning to lose  weight.  Chronic right shoulder pain    Essential hypertension  Well-controlled with current therapy  Chronic diastolic heart failure (H)  Patient seems to be euvolemic on exam  RIN (obstructive sleep apnea)  Continue with CPAP  Simple chronic bronchitis (H)  Patient seems to be well compensated at this time  Lymphedema  Chronic venous stasis relatively well compensated at this time  Muscle tension headache  Making sure the previously noted CT scan abnormalities don't need a follow-up    COPD   BEVSESPI replacement because of the expense    BMI:   Estimated body mass index is 39.87 kg/m  as calculated from the following:    Height as of this encounter: 1.829 m (6').    Weight as of this encounter: 133.4 kg (294 lb).               The information in this document, created by the medical scribe for me, accurately reflects the services I personally performed and the decisions made by me. I have reviewed and approved this document for accuracy prior to leaving the patient care area.  May 21, 2019 5:15 PM    Dontrell Gómez MD  [facility]

## 2019-05-24 ENCOUNTER — ANTICOAGULATION THERAPY VISIT (OUTPATIENT)
Dept: NURSING | Facility: CLINIC | Age: 79
End: 2019-05-24
Payer: MEDICARE

## 2019-05-24 LAB — INR POINT OF CARE: 2.2 (ref 0.86–1.14)

## 2019-05-24 PROCEDURE — 85610 PROTHROMBIN TIME: CPT | Mod: QW

## 2019-05-24 PROCEDURE — 99207 ZZC NO CHARGE NURSE ONLY: CPT

## 2019-05-24 PROCEDURE — 36416 COLLJ CAPILLARY BLOOD SPEC: CPT

## 2019-05-24 NOTE — PROGRESS NOTES
ANTICOAGULATION FOLLOW-UP CLINIC VISIT    Patient Name:  Erich Craven  Date:  2019  Contact Type:  Face to Face    SUBJECTIVE:  Patient Findings     Comments:   The patient was assessed for diet, medication, and activity level changes, missed or extra doses, bruising or bleeding, with no problem findings.          Clinical Outcomes     Negatives:   Major bleeding event, Thromboembolic event, Anticoagulation-related hospital admission, Anticoagulation-related ED visit, Anticoagulation-related fatality    Comments:   The patient was assessed for diet, medication, and activity level changes, missed or extra doses, bruising or bleeding, with no problem findings.             OBJECTIVE    INR Protime   Date Value Ref Range Status   2019 2.2 (A) 0.86 - 1.14 Final       ASSESSMENT / PLAN  INR assessment THER    Recheck INR In: 2 WEEKS    INR Location Clinic      Anticoagulation Summary  As of 2019    INR goal:   2.0-3.0   TTR:   42.8 % (1.3 mo)   INR used for dosin.2 (2019)   Warfarin maintenance plan:   7.5 mg (5 mg x 1.5) every Mon, Wed, Fri; 5 mg (5 mg x 1) all other days   Full warfarin instructions:   7.5 mg every Mon, Wed, Fri; 5 mg all other days   Weekly warfarin total:   42.5 mg   No change documented:   Rita York RN   Plan last modified:   Lanny Spivey RN (2019)   Next INR check:   2019   Target end date:            Anticoagulation Episode Summary     INR check location:       Preferred lab:       Send INR reminders to:    ANTICOAGULATION    Comments:         Anticoagulation Care Providers     Provider Role Specialty Phone number    Dontrell Gómez MD Referring Internal Medicine [phone]            See the Encounter Report to view Anticoagulation Flowsheet and Dosing Calendar (Go to Encounters tab in chart review, and find the Anticoagulation Therapy Visit)    Dosage adjustment made based on physician directed care plan.  INR 2.2.  Recent supra (4.0) without known  cause.  Resume previous dosing regimen and recheck INR 2 weeks.    Rita York RN

## 2019-05-30 ENCOUNTER — OFFICE VISIT (OUTPATIENT)
Dept: SLEEP MEDICINE | Facility: CLINIC | Age: 79
End: 2019-05-30
Attending: INTERNAL MEDICINE
Payer: MEDICARE

## 2019-05-30 VITALS
HEART RATE: 89 BPM | HEIGHT: 72 IN | DIASTOLIC BLOOD PRESSURE: 84 MMHG | WEIGHT: 301 LBS | SYSTOLIC BLOOD PRESSURE: 146 MMHG | OXYGEN SATURATION: 92 % | BODY MASS INDEX: 40.77 KG/M2 | RESPIRATION RATE: 16 BRPM

## 2019-05-30 DIAGNOSIS — E66.01 OBESITY, CLASS III, BMI 40-49.9 (MORBID OBESITY) (H): ICD-10-CM

## 2019-05-30 DIAGNOSIS — I48.20 CHRONIC ATRIAL FIBRILLATION (H): ICD-10-CM

## 2019-05-30 DIAGNOSIS — G47.33 OSA (OBSTRUCTIVE SLEEP APNEA): Primary | ICD-10-CM

## 2019-05-30 DIAGNOSIS — I50.30 (HFPEF) HEART FAILURE WITH PRESERVED EJECTION FRACTION (H): ICD-10-CM

## 2019-05-30 PROCEDURE — 99204 OFFICE O/P NEW MOD 45 MIN: CPT | Performed by: PHYSICIAN ASSISTANT

## 2019-05-30 ASSESSMENT — MIFFLIN-ST. JEOR: SCORE: 2113.33

## 2019-05-30 NOTE — PROGRESS NOTES
Sleep Consultation:    Date on this visit: 5/30/2019    Erich Craven is sent by [facility] for a sleep consultation regarding RIN.    Primary Physician: Dontrell Gómez     Erich Craven reports previously diagnosed RIN and waking up feeling exhausted, since he moved to [place] last December.  His medical history is significant for paroxysmal atrial fibrillation, cardiac pacemaker, HFpEF, CAD, HTN, morbid obesity, PE (2012), COPD, hyperlipidemia, hypothyroidism, elevated right diaphragm.    Mr. Craven was diagnosed with RIN in [place], [place] in 2002. I do not have details from that study. He used CPAP for 2-3 years and struggled with it. He would knock the mask off in a half sleep. He used a dental appliance for about 2 years just recently. He was recently seen at [facility] and was told that was not going to be doing much for his apnea. He was recommended to get back on CPAP.     There is a note in his chart that says he had a PSG on 5/5/2015. The scanned documents seem to be missing several pages which includes the summary of the study results.  Oximetry in 11/2014 show he spent 4.5 hours below 89% and O2 charles 68%. He had 159 desaturation events (duration of the study is not mentioned). It is not clear if he was on any treatment at that time.    Erich goes to sleep at 11:00 PM during the week. He wakes up at 7:00 AM without an alarm. He takes Lasix and then goes back to bed to elevate his feet. He does not always fall back to sleep. He falls asleep in 15 minutes.  Erich denies difficulty falling asleep.  He wakes up 2-3 times a night for 5 minutes before falling back to sleep.  Erich wakes up to go to the bathroom.  On weekends, Erich goes to sleep at 11:30 PM.  He wakes up at 8:00 AM without an alarm. He falls asleep in 15 minutes.  Patient gets an average of 8.5 hours of sleep per night.     Patient does use electronics in bed and does not watch TV in bed, worry in bed about anything and  read in bed.     Erich is retired. 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  He moved here last December. He lives alone.    Erich does not know if he snores or has pauses in breathing because he does not have a regular bed partner. He sleeps on his side. He has occasional morning dry mouth and morning confusion( feels it takes about 30 minutes to get oriented), denies snort arousals, morning headaches and restless legs. Erich denies any bruxism, sleep walking, sleep talking, dream enactment, sleep paralysis, cataplexy and hypnogogic/hypnopompic hallucinations.    Erich denies difficulty breathing through his nose, claustrophobia, reflux at night, heartburn and depression.      Erich has gained 15-20 pounds in the last half year.  Patient describes themself as a night person.  He would prefer to go to sleep at 11:30 PM and wake up at 7:30 AM.  Patient's [place] Sleepiness score 6/24 inconsistent with daytime sleepiness.      Erich naps 5-6 times per week for 30-60 minutes, feels refreshed after naps. He naps in the mid afternoon. He takes no inadvertant naps.  He denies dozing while driving.  Patient was counseled on the importance of driving while alert, to pull over if drowsy, or nap before getting into the vehicle if sleepy.  He uses two 20 oz bottles of soda/day. Last caffeine intake is usually before 6 PM.    PFT in [place] in 2018 showed: FVC 63%, FEV1 55%, FEV1/FVC 66%, TLC 68%, RV 84%, DLCO 62%.    Allergies:    No Known Allergies    Medications:    Current Outpatient Medications   Medication Sig Dispense Refill     Acetylcarnitine HCl (ACETYL L-CARNITINE) 500 MG CAPS Take 1 capsule by mouth 2 times daily       albuterol (PROAIR HFA/PROVENTIL HFA/VENTOLIN HFA) 108 (90 Base) MCG/ACT inhaler Inhale 2 puffs into the lungs every 4 hours as needed       aspirin (ASA) 81 MG chewable tablet Take 1 tablet by mouth daily       atorvastatin (LIPITOR) 40 MG tablet Take 1 tablet (40 mg) by  mouth every evening 90 tablet 3     B Complex Vitamins (VITAMIN B COMPLEX PO) Take 1 capsule by mouth daily       CALCIUM PO Take 600 mg by mouth daily       carvedilol (COREG) 12.5 MG tablet Take 12.5 mg by mouth 2 times daily       Cholecalciferol (VITAMIN D3) 1000 units CAPS Take 1,000 Units by mouth daily       clonazePAM (KLONOPIN) 0.5 MG tablet Take 1 tablet (0.5 mg) by mouth 2 times daily as needed 60 tablet 0     co-enzyme Q-10 100 MG CAPS capsule Take 100 mg by mouth daily       diltiazem ER COATED BEADS (CARDIZEM CD/CARTIA XT) 300 MG 24 hr capsule Take 300 mg by mouth daily       furosemide (LASIX) 20 MG tablet TAKE 3 TABLETS BY MOUTH TWICE DAILY. 540 tablet 0     Glycopyrrolate-Formoterol (BEVESPI AEROSPHERE) 9-4.8 MCG/ACT oral inhaler Inhale 2 puffs into the lungs 2 times daily 1 Inhaler 3     Glycopyrrolate-Formoterol (BEVESPI AEROSPHERE) 9-4.8 MCG/ACT oral inhaler Inhale 2 puffs into the lungs 2 times daily 10.7 g 3     ipratropium (ATROVENT) 0.03 % nasal spray Inhale 1-2 sprays into the lungs nightly as needed       Ipratropium-Albuterol (COMBIVENT RESPIMAT)  MCG/ACT inhaler Inhale 2 puffs into the lungs 2 times daily 1 Inhaler 3     isosorbide mononitrate (IMDUR) 30 MG 24 hr tablet Take 1 tablet (30 mg) by mouth daily 90 tablet 3     levothyroxine (SYNTHROID/LEVOTHROID) 175 MCG tablet Take 1 tablet by mouth daily Additional 1/2 tablet on Sundays       lisinopril (PRINIVIL/ZESTRIL) 40 MG tablet Take 40 mg by mouth daily       Magnesium 500 MG CAPS Take 1 capsule by mouth daily       metolazone (ZAROXOLYN) 2.5 MG tablet Take 1 tablet (2.5 mg) by mouth once a week On Thursday 30 tablet 11     Multiple vitamin TABS Take 1 tablet by mouth daily       potassium chloride ER (K-TAB/KLOR-CON) 10 MEQ CR tablet Take 10 mEq by mouth 2 times daily       tamsulosin (FLOMAX) 0.4 MG capsule Take 1 capsule (0.4 mg) by mouth daily 90 capsule 3     Taurine 500 MG CAPS Take 500 mg by mouth 2 times daily        venlafaxine (EFFEXOR) 37.5 MG tablet Take 37.5 mg by mouth 2 times daily       warfarin (COUMADIN) 5 MG tablet Take 1-1.5 tablets (5-7.5 mg) by mouth daily As directed by INR clinic 135 tablet 0     dexamethasone (DECADRON) 4 MG/ML injection Inject 1 mL (4 mg) as directed once for 1 dose Use 4 mg or dose determined by provider for iontophoresis. 1 mL 0       Problem List:  Patient Active Problem List    Diagnosis Date Noted     Muscle tension headache 05/21/2019     Priority: Medium     PE (pulmonary thromboembolism) (H)      Priority: Medium     HTN (hypertension)      Priority: Medium     Chronic diastolic heart failure (H)      Priority: Medium     Dyspnea      Priority: Medium     Fatigue      Priority: Medium     RIN (obstructive sleep apnea)      Priority: Medium     COPD (chronic obstructive pulmonary disease) (H)      Priority: Medium     Hypothyroidism      Priority: Medium     Paroxysmal A-fib (H)      Priority: Medium     HLD (hyperlipidemia)      Priority: Medium     Lymphedema      Priority: Medium     (HFpEF) heart failure with preserved ejection fraction (H)      Priority: Medium     Essential hypertension      Priority: Medium     Cardiac pacemaker in situ      Priority: Medium     DVT (deep venous thrombosis) (H)      Priority: Medium     CAD (coronary artery disease)      Priority: Medium     PCI w/ [facility] in 2014       Obesity, Class III, BMI 40-49.9 (morbid obesity) (H)      Priority: Medium     Shoulder pain, right 04/18/2019     Priority: Medium     Impingement syndrome, shoulder, right 04/18/2019     Priority: Medium     Morbid obesity (H) 04/14/2019     Priority: Medium        Past Medical/Surgical History:  Past Medical History:   Diagnosis Date     (HFpEF) heart failure with preserved ejection fraction (H)      BPH with urinary obstruction      CAD (coronary artery disease)     PCI w/ [facility] in 2014     Cardiac pacemaker in situ      Chronic diastolic heart failure (H)      COPD (chronic  obstructive pulmonary disease) (H)      DVT (deep venous thrombosis) (H)      Dyspnea      Essential hypertension      Fatigue      HLD (hyperlipidemia)      Hypothyroidism      Lymphedema      Obesity, Class III, BMI 40-49.9 (morbid obesity) (H)      RIN (obstructive sleep apnea)     has refused CPAP     Paroxysmal A-fib (H)      PE (pulmonary thromboembolism) (H)      No past surgical history on file.    Social History:  Social History     Socioeconomic History     Marital status:      Spouse name: Not on file     Number of children: Not on file     Years of education: Not on file     Highest education level: Not on file   Occupational History     Not on file   Social Needs     Financial resource strain: Not on file     Food insecurity:     Worry: Not on file     Inability: Not on file     Transportation needs:     Medical: Not on file     Non-medical: Not on file   Tobacco Use     Smoking status: Former Smoker     Smokeless tobacco: Never Used     Tobacco comment: quit in 1989   Substance and Sexual Activity     Alcohol use: No     Frequency: Never     Comment: quit in 1989     Drug use: No     Sexual activity: Not Currently     Partners: Female   Lifestyle     Physical activity:     Days per week: Not on file     Minutes per session: Not on file     Stress: Not on file   Relationships     Social connections:     Talks on phone: Not on file     Gets together: Not on file     Attends Yazdanism service: Not on file     Active member of club or organization: Not on file     Attends meetings of clubs or organizations: Not on file     Relationship status: Not on file     Intimate partner violence:     Fear of current or ex partner: Not on file     Emotionally abused: Not on file     Physically abused: Not on file     Forced sexual activity: Not on file   Other Topics Concern     Not on file   Social History Narrative     Not on file       Family History:  Family History   Problem Relation Age of Onset      Hypertension Father        Review of Systems:  A complete review of systems reviewed by me is negative with the exeption of what has been mentioned in the history of present illness.  CONSTITUTIONAL: NEGATIVE for weight gain/loss, fever, chills, sweats or night sweats, drug allergies.  EYES: NEGATIVE for changes in vision, blind spots, double vision.  ENT: NEGATIVE for ear pain, sinus pain, post-nasal drip, bloody nose  ENT:  POSITIVE for  sore throat and runny nose  CARDIAC: NEGATIVE for chest pain or pressure, breathlessness when lying flat.  CARDIAC:  POSITIVE for  fast heart beats, fluttering in chest, swollen legs, swollen feet and HTN/heart disease  NEUROLOGIC: NEGATIVE weakness or numbness in the arms or legs.  NEUROLOGIC:  POSITIVE for  headaches  DERMATOLOGIC: NEGATIVE for rashes  DERMATOLOGIC:  POSITIVE for  new mole(s) or change in mole(s)  PULMONARY: NEGATIVE SOB at rest, coughing up blood, whistling when breathing.    PULMONARY:  POSITIVE for  SOB with activity, dry cough, productive cough and wheezing   GASTROINTESTINAL: NEGATIVE for nausea or vomitting, loose or watery stools, fat or grease in stools, constipation, abdominal pain, bowel movements black in color or blood noted.  GENITOURINARY: NEGATIVE for pain during urination, blood in urine, irregular menstrual periods.  GENITOURINARY:  POSITIVE for  urinating more frequently than usual  MUSCULOSKELETAL: NEGATIVE for swollen joints.  MUSCULOSKELETAL:  POSITIVE for  muscle pain and bone or joint pain  ENDOCRINE: NEGATIVE for increased thirst, diabetes.  ENDOCRINE:  POSITIVE for  increased urination  LYMPHATIC: NEGATIVE for swollen lymph nodes, lumps or bumps in the breasts or nipple discharge.    Physical Examination:  Vitals: /87   Pulse 89   Resp 16   Ht 1.829 m (6')   Wt 136.5 kg (301 lb)   SpO2 92%   BMI 40.82 kg/m   He was taking big breaths during the oxygen reading to try to keep his sats up.     Neck Cir (cm): 48 cm    [place]  Total Score 5/30/2019   Total score - [place] 6       ARLIN Total Score: 11 (05/30/19 0834)    GENERAL APPEARANCE: healthy, alert, no distress and cooperative  EYES: Eyes grossly normal to inspection, PERRL, pupils constricted, conjunctivae and sclerae normal and lids and lashes normal  HENT: nose and mouth without ulcers or lesions, oropharynx small and crowded and soft palate dependent  NECK: no adenopathy, no asymmetry, masses, or scars, thyroid normal to palpation and trachea midline and normal to palpation  RESP: expiratory wheezes L upper posterior, mild rhonchi diffusely  CV: regular rates and rhythm, normal S1 S2, no S3 or S4, no murmur, click or rub and no irregular beats  LYMPHATICS: no cervical adenopathy  MS: extremities normal- no gross deformities noted and pitting 2+ lower extremity edema bilaterally with brownish discoloration  NEURO: Normal strength and tone, mentation intact, speech normal and cranial nerves 2-12 intact  Mallampati Class: IV.  Tonsillar Stage: 0  surgically removed.    Impression/Plan:    (G47.33) RIN (obstructive sleep apnea)  (primary encounter diagnosis), (I48.2) Chronic atrial fibrillation (H), (E66.01) Obesity, Class III, BMI 40-49.9 (morbid obesity) (H), (I50.30) (HFpEF) heart failure with preserved ejection fraction (H)  Comment: Mr. Craven presents for management of previously diagnosed RIN. He recalls being diagnosed in 2002 in [place]. His chart notes make mention of a study in 2015, but documentation is missing. Oximetry in 2014 suggest significant apnea and hypoxemia. Erich did not tolerate CPAP well. He was recently using a dental appliance but was told it probably would not be doing much for him, which I wager is true. He has felt more tired since moving here to [place]. His ESS is normal at 6/24, but he naps most days in the afternoon. His BMI is 40, he has HTN, his age is >50 (79), neck circumference is >40 cm (48 cm) and gender is male. He also has a medical  history of HFpEF, paroxysmal A-Fib, COPD and elevated right diaphragm. His O2 sats today were 92%, but he was breathing heavily to try to keep them up. They dropped to 89% when he stopped. His CO2 has been normal on metabolic panels. He would be a candidate for ASV if indicated, LVEF 66%.  Plan: Comprehensive Sleep Study        Split night PSG with CPAP/BiPAP titration if indicated. TCM to evaluate for hypoventilation. I did not order an ABG given his CO2 has been normal on metabolic panel and he is on warfarin. I will get one if his study shows significant hypoxemia that does not respond to standard BiPAP.    Literature provided regarding sleep apnea.      He will follow up with me in approximately two weeks after his sleep study has been competed to review the results and discuss plan of care.       Polysomnography reviewed.  Obstructive sleep apnea reviewed.  Complications of untreated sleep apnea were reviewed.    45 minutes was spent during this visit, over 50% in counseling and coordination of care.   Bennett Goltz, PA-C    CC: [facility]  Dontrell Gómez MD

## 2019-05-30 NOTE — NURSING NOTE
Chief Complaint   Patient presents with     Sleep Problem     Need to get a CPAP       Initial /87   Pulse 89   Resp 16   Ht 1.829 m (6')   Wt 136.5 kg (301 lb)   SpO2 92%   BMI 40.82 kg/m   Estimated body mass index is 40.82 kg/m  as calculated from the following:    Height as of this encounter: 1.829 m (6').    Weight as of this encounter: 136.5 kg (301 lb).    Medication Reconciliation: complete     ESS 6  Neck 48cm  Daria Brizuela

## 2019-05-30 NOTE — PATIENT INSTRUCTIONS
"MY TREATMENT INFORMATION FOR SLEEP APNEA-  Erich Craven    DOCTOR : Bennett Ezra Goltz, PA-C  SLEEP CENTER : [facility]     MY CONTACT NUMBER: [phone]    Am I having a sleep study at a sleep center?  Make sure you have an appointment for the study before you leave!    Am I having a home sleep study?  Watch this video:  [email or web address].adicate [email or web address]/watch?v=CteI_GhyP9g&[email or web address]  Please verify your insurance coverage with your insurance carrier    Frequently asked questions:  1. What is Obstructive Sleep Apnea (RIN)? RIN is the most common type of sleep apnea. Apnea means, \"without breath.\"  Apnea is most often caused by narrowing or collapse of the upper airway as muscles relax during sleep.   Almost everyone has occasional apneas. Most people with sleep apnea have had brief interruptions at night frequently for many years.  The severity of sleep apnea is related to how frequent and severe the events are.   2. What are the consequences of RIN? Symptoms include: feeling sleepy during the day, snoring loudly, gasping or stopping of breathing, trouble sleeping, and occasionally morning headaches or heartburn at night.  Sleepiness can be serious and even increase the risk of falling asleep while driving. Other health consequences may include development of high blood pressure and other cardiovascular disease in persons who are susceptible. Untreated RIN  can contribute to heart disease, stroke and diabetes.   3. What are the treatment options? In most situations, sleep apnea is a lifelong disease that must be managed with daily therapy. Medications are not effective for sleep apnea and surgery is generally not considered until other therapies have been tried. Your treatment is your choice . Continuous Positive Airway (CPAP) works right away and is the therapy that is effective in nearly everyone. An oral device to hold your jaw forward is usually the next most reliable option. Other options " include postioning devices (to keep you off your back), weight loss, and surgery including a tongue pacing device. There is more detail about some of these options below.    Important tips for using CPAP and similar devices   Know your equipment:  CPAP is continuous positive airway pressure that prevents obstructive sleep apnea by keeping the throat from collapsing while you are sleeping. In most cases, the device is  smart  and can slowly self-adjusts if your throat collapses and keeps a record every day of how well you are treated-this information is available to you and your care team.  BPAP is bilevel positive airway pressure that keeps your throat open and also assists each breath with a pressure boost to maintain adequate breathing.  Special kinds of BPAP are used in patients who have inadequate breathing from lung or heart disease. In most cases, the device is  smart  and can slowly self-adjusts to assist breathing. Like CPAP, the device keeps a record of how well you are treated.  Your mask is your connection to the device. You get to choose what feels most comfortable and the staff will help to make sure if fits. Here: are some examples of the different masks that are available:       Key points to remember on your journey with sleep apnea:  1. Sleep study.  PAP devices often need to be adjusted during a sleep study to show that they are effective and adjusted right.  2. Good tips to remember: Try wearing just the mask during a quiet time during the day so your body adapts to wearing it. A humidifier is recommended for comfort in most cases to prevent drying of your nose and throat. Allergy medication from your provider may help you if you are having nasal congestion.  3. Getting settled-in. It takes more than one night for most of us to get used to wearing a mask. Try wearing just the mask during a quiet time during the day so your body adapts to wearing it. A humidifier is recommended for comfort in most  cases. Our team will work with you carefully on the first day and will be in contact within 4 days and again at 2 and 4 weeks for advice and remote device adjustments. Your therapy is evaluated by the device each day.   4. Use it every night. The more you are able to sleep naturally for 7-8 hours, the more likely you will have good sleep and to prevent health risks or symptoms from sleep apnea. Even if you use it 4 hours it helps. Occasionally all of us are unable to use a medical therapy, in sleep apnea, it is not dangerous to miss one night.   5. Communicate. Call our skilled team on the number provided on the first day if your visit for problems that make it difficult to wear the device. Over 2 out of 3 patients can learn to wear the device long-term with help from our team. Remember to call our team or your sleep providers if you are unable to wear the device as we may have other solutions for those who cannot adapt to mask CPAP therapy. It is recommended that you sleep your sleep provider within the first 3 months and yearly after that if you are not having problems.   6. Use it for your health. We encourage use of CPAP masks during daytime quiet periods to allow your face and brain to adapt to the sensation of CPAP so that it will be a more natural sensation to awaken to at night or during naps. This can be very useful during the first few weeks or months of adapting to CPAP though it does not help medically to wear CPAP during wakefulness and  should not be used as a strategy just to meet guidelines.  7. Take care of your equipment. Make sure you clean your mask and tubing using directions every day and that your filter and mask are replaced as recommended or if they are not working.     BESIDES CPAP, WHAT OTHER THERAPIES ARE THERE?  Positioning Device  Positioning devices are generally used when sleep apnea is mild and only occurs on your back.This example shows a pillow that straps around the waist. It may  be appropriate for those whose sleep study shows milder sleep apnea that occurs primarily when lying flat on one's back. Preliminary studies have shown benefit but effectiveness at home may need to be verified by a home sleep test. These devices are generally not covered by medical insurance.  Examples of devices that maintain sleeping on the back to prevent snoring and mild sleep apnea.    Belt type body positioner  Http://StormWind.com/    Electronic reminder  Http://nightshifttherapy.com/  Http://www.[facility]au/      Oral Appliance  What is oral appliance therapy?  An oral appliance device fits on your teeth at night like a retainer used after having braces. The device is made by a specialized dentist and requires several visits over 1-2 months before a manufactured device is made to fit your teeth and is adjusted to prevent your sleep apnea. Once an oral device is working properly, snoring should be improved. A home sleep test may be recommended at that time if to determine whether the sleep apnea is adequately treated.       Some things to remember:  -Oral devices are often, but not always, covered by your medical insurance. Be sure to check with your insurance provider.   -If you are referred for oral therapy, you will be given a list of specialized dentists to consider or you may choose to visit the Web site of the American Academy of Dental Sleep Medicine  -Oral devices are less likely to work if you have severe sleep apnea or are extremely overweight.     More detailed information  An oral appliance is a small acrylic device that fits over the upper and lower teeth  (similar to a retainer or a mouth guard). This device slightly moves jaw forward, which moves the base of the tongue forward, opens the airway, improves breathing for effective treat snoring and obstructive sleep apnea in perhaps 7 out of 10 people .  The best working devices are custom-made by a dental device  after a mold is  made of the teeth 1, 2, 3.  When is an oral appliance indicated?  Oral appliance therapy is recommended as a first-line treatment for patients with primary snoring, mild sleep apnea, and for patients with moderate sleep apnea who prefer appliance therapy to use of CPAP4, 5. Severity of sleep apnea is determined by sleep testing and is based on the number of respiratory events per hour of sleep.   How successful is oral appliance therapy?  The success rate of oral appliance therapy in patients with mild sleep apnea is 75-80% while in patients with moderate sleep apnea it is 50-70%. The chance of success in patients with severe sleep apnea is 40-50%. The research also shows that oral appliances have a beneficial effect on the cardiovascular health of RIN patients at the same magnitude as CPAP therapy7.  Oral appliances should be a second-line treatment in cases of severe sleep apnea, but if not completely successful then a combination therapy utilizing CPAP plus oral appliance therapy may be effective. Oral appliances tend to be effective in a broad range of patients although studies show that the patients who have the highest success are females, younger patients, those with milder disease, and less severe obesity. 3, 6.   Finding a dentist that practices dental sleep medicine  Specific training is available through the American Academy of Dental Sleep Medicine for dentists interested in working in the field of sleep. To find a dentist who is educated in the field of sleep and the use of oral appliances, near you, visit the Web site of the American Academy of Dental Sleep Medicine.    References  1. Aramis, et al. Objectively measured vs self-reported compliance during oral appliance therapy for sleep-disordered breathing. Chest 2013; 144(5): 9224-4740.  2. Satnam et al. Objective measurement of compliance during oral appliance therapy for sleep-disordered breathing. Thorax 2013; 68(1): 91-96.  3. Abiodun  et al. Mandibular advancement devices in 620 men and women with RIN and snoring: tolerability and predictors of treatment success. Chest 2004; 125: 6828-4494.  4. Amilcar et al. Oral appliances for snoring and RIN: a review. Sleep 2006; 29: 244-262.  5. Abran et al. Oral appliance treatment for RIN: an update. J Clin Sleep Med 2014; 10(2): 215-227.  6. Rachel et al. Predictors of OSAH treatment outcome. J Dent Res 2007; 86: 8936-1542.  Weight Loss:    Weight loss is a long-term strategy that may improve sleep apnea in some patients.    Weight management is a personal decision and the decision should be based on your interest and the potential benefits.  If you are interested in exploring weight loss strategies, the following discussion covers the impact on weight loss on sleep apnea and the approaches that may be successful.    Being overweight does not necessarily mean you will have health consequences.  Those who have BMI over 35 or over 27 with existing medical conditions carries greater risk.   Weight loss decreases severity of sleep apnea in most people with obesity. For those with mild obesity who have developed snoring with weight gain, even 15-30 pound weight loss can improve and occasionally eliminate sleep apnea.  Structured and life-long dietary and health habits are necessary to lose weight and keep healthier weight levels.     Though there may be significant health benefits from weight loss, long-term weight loss is very difficult to achieve- studies show success with dietary management in less than 10% of people. In addition, substantial weight loss may require years of dietary control and may be difficult if patients have severe obesity. In these cases, surgical management may be considered.  Finally, older individuals who have tolerated obesity without health complications may be less likely to benefit from weight loss strategies.      Your BMI is Body mass index is 40.82 kg/m .  Weight  management is a personal decision.  If you are interested in exploring weight loss strategies, the following discussion covers the approaches that may be successful. Body mass index (BMI) is one way to tell whether you are at a healthy weight, overweight, or obese. It measures your weight in relation to your height.  A BMI of 18.5 to 24.9 is in the healthy range. A person with a BMI of 25 to 29.9 is considered overweight, and someone with a BMI of 30 or greater is considered obese. More than two-thirds of American adults are considered overweight or obese.  Being overweight or obese increases the risk for further weight gain. Excess weight may lead to heart disease and diabetes.  Creating and following plans for healthy eating and physical activity may help you improve your health.  Weight control is part of healthy lifestyle and includes exercise, emotional health, and healthy eating habits. Careful eating habits lifelong are the mainstay of weight control. Though there are significant health benefits from weight loss, long-term weight loss with diet alone may be very difficult to achieve- studies show long-term success with dietary management in less than 10% of people. Attaining a healthy weight may be especially difficult to achieve in those with severe obesity. In some cases, medications, devices and surgical management might be considered.  What can you do?  If you are overweight or obese and are interested in methods for weight loss, you should discuss this with your provider.     Consider reducing daily calorie intake by 500 calories.     Keep a food journal.     Avoiding skipping meals, consider cutting portions instead.    Diet combined with exercise helps maintain muscle while optimizing fat loss. Strength training is particularly important for building and maintaining muscle mass. Exercise helps reduce stress, increase energy, and improves fitness. Increasing exercise without diet control, however, may  not burn enough calories to loose weight.       Start walking three days a week 10-20 minutes at a time    Work towards walking thirty minutes five days a week     Eventually, increase the speed of your walking for 1-2 minutes at time    In addition, we recommend that you review healthy lifestyles and methods for weight loss available through the National Institutes of Health patient information sites:  http://win.niddk.nih.gov/publications/index.htm    And look into health and wellness programs that may be available through your health insurance provider, employer, local community center, or annie club.    Weight management plan: Patient was referred to their PCP to discuss a diet and exercise plan.    Surgery:  Surgery for obstructive sleep apnea is considered generally only when other therapies fail to work. Surgery may be discussed with you if you are having a difficult time tolerating CPAP and or when there is an abnormal structure that requires surgical correction.  Nose and throat surgeries often enlarge the airway to prevent collapse.  Most of these surgeries create pain for 1-2 weeks and up to half of the most common surgeries are not effective throughout life.  You should carefully discuss the benefits and drawbacks to surgery with your sleep provider and surgeon to determine if it is the best solution for you.   More information  Surgery for RIN is directed at areas that are responsible for narrowing or complete obstruction of the airway during sleep.  There are a wide range of procedures available to enlarge and/or stabilize the airway to prevent blockage of breathing in the three major areas where it can occur: the palate, tongue, and nasal regions.  Successful surgical treatment depends on the accurate identification of the factors responsible for obstructive sleep apnea in each person.  A personalized approach is required because there is no single treatment that works well for everyone.  Because of  anatomic variation, consultation with an examination by a sleep surgeon is a critical first step in determining what surgical options are best for each patient.  In some cases, examination during sedation may be recommended in order to guide the selection of procedures.  Patients will be counseled about risks and benefits as well as the typical recovery course after surgery. Surgery is typically not a cure for a person s RIN.  However, surgery will often significantly improve one s RIN severity (termed  success rate ).  Even in the absence of a cure, surgery will decrease the cardiovascular risk associated with OSA7; improve overall quality of life8 (sleepiness, functionality, sleep quality, etc).  Palate Procedures:  Patients with RIN often have narrowing of their airway in the region of their tonsils and uvula.  The goals of palate procedures are to widen the airway in this region as well as to help the tissues resist collapse.  Modern palate procedure techniques focus on tissue conservation and soft tissue rearrangement, rather than tissue removal.  Often the uvula is preserved in this procedure. Residual sleep apnea is common in patient after pharyngoplasty with an average reduction in sleep apnea events of 33%2.    Tongue Procedures:  ExamWhile patients are awake, the muscles that surround the throat are active and keep this region open for breathing. These muscles relax during sleep, allowing the tongue and other structures to collapse and block breathing.  There are several different tongue procedures available.  Selection of a tongue base procedure depends on characteristics seen on physical exam.  Generally, procedures are aimed at removing bulky tissues in this area or preventing the back of the tongue from falling back during sleep.  Success rates for tongue surgery range from 50-62%3.  Hypoglossal Nerve Stimulation:  Hypoglossal nerve stimulation has recently received approval from the [place] Food  and Drug Administration for the treatment of obstructive sleep apnea.  This is based on research showing that the system was safe and effective in treating sleep apnea6.  Results showed that the median AHI score decreased 68%, from 29.3 to 9.0. This therapy uses an implant system that senses breathing patterns and delivers mild stimulation to airway muscles, which keeps the airway open during sleep.  The system consists of three fully implanted components: a small generator (similar in size to a pacemaker), a breathing sensor, and a stimulation lead.  Using a small handheld remote, a patient turns the therapy on before bed and off upon awakening.    Candidates for this device must be greater than 22 years of age, have moderate to severe RIN (AHI between 20-65), BMI less than 32, have tried CPAP/oral appliance without success, and have appropriate upper airway anatomy (determined by a sleep endoscopy performed by Dr. Bartlett).  Hypoglossal Nerve Stimulation Pathway:    The sleep surgeon s office will work with the patient through the insurance prior-authorization process (including communications and appeals).    Nasal Procedures:  Nasal obstruction can interfere with nasal breathing during the day and night.  Studies have shown that relief of nasal obstruction can improve the ability of some patients to tolerate positive airway pressure therapy for obstructive sleep apnea1.  Treatment options include medications such as nasal saline, topical corticosteroid and antihistamine sprays, and oral medications such as antihistamines or decongestants. Non-surgical treatments can include external nasal dilators for selected patients. If these are not successful by themselves, surgery can improve the nasal airway either alone or in combination with these other options.  Combination Procedures:  Combination of surgical procedures and other treatments may be recommended, particularly if patients have more than one area of narrowing  or persistent positional disease.  The success rate of combination surgery ranges from 66-80%2,3.    References  1. Natalia GALAVIZ. The Role of the Nose in Snoring and Obstructive Sleep Apnoea: An Update.  Eur Arch Otorhinolaryngol. 2011; 268: 1365-73.  2.  Hiram SM; Thor JA; Yobani JR; Pallanch JF; Jo Ann MB; Rakesh SG; Madina BAGLEY. Surgical modifications of the upper airway for obstructive sleep apnea in adults: a systematic review and meta-analysis. SLEEP 2010;33(10):0099-5872. Kwadwo BONDS. Hypopharyngeal surgery in obstructive sleep apnea: an evidence-based medicine review.  Arch Otolaryngol Head Neck Surg. 2006 Feb;132(2):206-13.  3. Gigi YH1, Crystal Y, Yosef HARRY. The efficacy of anatomically based multilevel surgery for obstructive sleep apnea. Otolaryngol Head Neck Surg. 2003 Oct;129(4):327-35.  4. Kwadwo BONDS, Goldberg A. Hypopharyngeal Surgery in Obstructive Sleep Apnea: An Evidence-Based Medicine Review. Arch Otolaryngol Head Neck Surg. 2006 Feb;132(2):206-13.  5. Sayra PJ et al. Upper-Airway Stimulation for Obstructive Sleep Apnea.  N Engl J Med. 2014 Jan 9;370(2):139-49.  6. Earle Y et al. Increased Incidence of Cardiovascular Disease in Middle-aged Men with Obstructive Sleep Apnea. Am J Respir Crit Care Med; 2002 166: 159-165  7. Radha EM et al. Studying Life Effects and Effectiveness of Palatopharyngoplasty (SLEEP) study: Subjective Outcomes of Isolated Uvulopalatopharyngoplasty. Otolaryngol Head Neck Surg. 2011; 144: 623-631.

## 2019-06-11 DIAGNOSIS — F43.21 ADJUSTMENT DISORDER WITH DEPRESSED MOOD: ICD-10-CM

## 2019-06-11 NOTE — TELEPHONE ENCOUNTER
Pending Prescriptions:                       Disp   Refills    clonazePAM (KLONOPIN) 0.5 MG tablet       60 tab*0            Sig: Take 1 tablet (0.5 mg) by mouth 2 times daily as           needed          Last Written Prescription Date:  05/16/2019  Last Fill Quantity: 60,   # refills: 0  Last Office Visit: 05/21/2019  Future Office visit:    Next 5 appointments (look out 90 days)    Jul 10, 2019  8:15 AM CDT  Return Visit with Brent Portillo MD  [facility] ([facility]) [address] [place] [id number] [phone] OPT 2           Routing refill request to provider for review/approval because:  Drug not on the [facility] or [facility] refill protocol or controlled substance

## 2019-06-11 NOTE — TELEPHONE ENCOUNTER
Reason for Call:  Medication or medication refill:    Do you use a [facility]?  Name of the pharmacy and phone number for the current request:    [id number] - [place], [place] - [address]      Name of the medication requested:   clonazePAM (KLONOPIN) 0.5 MG tablet 60 tablet 0 5/16/2019  No   Sig - Route: Take 1 tablet (0.5 mg) by mouth 2 times daily as needed - Oral   Class: Local Print   Order: [id number]       Call taken on 6/11/2019 at 2:37 PM by Beena Azevedo

## 2019-06-12 ENCOUNTER — ANTICOAGULATION THERAPY VISIT (OUTPATIENT)
Dept: NURSING | Facility: CLINIC | Age: 79
End: 2019-06-12
Payer: MEDICARE

## 2019-06-12 LAB — INR POINT OF CARE: 2.5 (ref 0.86–1.14)

## 2019-06-12 PROCEDURE — 85610 PROTHROMBIN TIME: CPT | Mod: QW

## 2019-06-12 PROCEDURE — 36416 COLLJ CAPILLARY BLOOD SPEC: CPT

## 2019-06-12 RX ORDER — CLONAZEPAM 0.5 MG/1
0.5 TABLET ORAL 2 TIMES DAILY PRN
Qty: 60 TABLET | Refills: 0 | Status: SHIPPED | OUTPATIENT
Start: 2019-06-12 | End: 2019-07-08

## 2019-06-12 NOTE — PROGRESS NOTES
ANTICOAGULATION FOLLOW-UP CLINIC VISIT    Patient Name:  Erich Craven  Date:  2019  Contact Type:  Face to Face    SUBJECTIVE:  Patient Findings     Comments:   The patient was assessed for diet, medication, and activity level changes, missed or extra doses, bruising or bleeding, with no problem findings.          Clinical Outcomes     Negatives:   Major bleeding event, Thromboembolic event, Anticoagulation-related hospital admission, Anticoagulation-related ED visit, Anticoagulation-related fatality    Comments:   The patient was assessed for diet, medication, and activity level changes, missed or extra doses, bruising or bleeding, with no problem findings.             OBJECTIVE    INR Protime   Date Value Ref Range Status   2019 2.5 (A) 0.86 - 1.14 Final       ASSESSMENT / PLAN  INR assessment THER    Recheck INR In: 4 WEEKS    INR Location Clinic      Anticoagulation Summary  As of 2019    INR goal:   2.0-3.0   TTR:   61.2 % (2 mo)   INR used for dosin.5 (2019)   Warfarin maintenance plan:   7.5 mg (5 mg x 1.5) every Mon, Wed, Fri; 5 mg (5 mg x 1) all other days   Full warfarin instructions:   7.5 mg every Mon, Wed, Fri; 5 mg all other days   Weekly warfarin total:   42.5 mg   Plan last modified:   Lanny Spivey RN (2019)   Next INR check:   7/10/2019   Target end date:            Anticoagulation Episode Summary     INR check location:       Preferred lab:       Send INR reminders to:    ANTICOAGULATION    Comments:         Anticoagulation Care Providers     Provider Role Specialty Phone number    Dontrell Gómez MD Referring Internal Medicine [phone]            See the Encounter Report to view Anticoagulation Flowsheet and Dosing Calendar (Go to Encounters tab in chart review, and find the Anticoagulation Therapy Visit)        Evita Lloyd RN

## 2019-06-16 ENCOUNTER — THERAPY VISIT (OUTPATIENT)
Dept: SLEEP MEDICINE | Facility: CLINIC | Age: 79
End: 2019-06-16
Payer: MEDICARE

## 2019-06-16 DIAGNOSIS — I48.20 CHRONIC ATRIAL FIBRILLATION (H): ICD-10-CM

## 2019-06-16 DIAGNOSIS — G47.33 OSA (OBSTRUCTIVE SLEEP APNEA): ICD-10-CM

## 2019-06-16 DIAGNOSIS — I50.30 (HFPEF) HEART FAILURE WITH PRESERVED EJECTION FRACTION (H): ICD-10-CM

## 2019-06-16 DIAGNOSIS — E66.01 OBESITY, CLASS III, BMI 40-49.9 (MORBID OBESITY) (H): ICD-10-CM

## 2019-06-16 PROCEDURE — 95810 POLYSOM 6/> YRS 4/> PARAM: CPT | Performed by: PSYCHIATRY & NEUROLOGY

## 2019-06-17 NOTE — PROCEDURES
SLEEP STUDY INTERPRETATION  DIAGNOSTIC POLYSOMNOGRAPHY REPORT      Patient: KHUSHBOO NINO  YOB: 1940  Study Date: 6/16/2019  MRN: [id number]  Referring Provider: MD Portillo Karl  Ordering Provider: Goltz, PA-C, Bennett    Indications for Polysomnography: The patient is a 79 y old Male who is 6' and weighs 301.0 lbs. His BMI is 41.2, Bard sleepiness scale 6.0 and neck circumference is 48.0 cm. Relevant medical history includes COPD, CAD, atrial arrhythmia, morbid obesity, untreated RIN. A diagnostic polysomnogram was performed to evaluate for sleep disordered breathing    Polysomnogram Data: A full night polysomnogram recorded the standard physiologic parameters including EEG, EOG, EMG, ECG, nasal and oral airflow. Respiratory parameters of chest and abdominal movements were recorded with respiratory inductance plethysmography. Oxygen saturation was recorded by pulse oximetry. Hypopnea scoring rule used: 1B 4%.    Sleep Architecture: Sleep fragmentation  The total recording time of the polysomnogram was 419.4 minutes. The total sleep time was 256.5 minutes. Sleep latency was 51.5 minutes. REM latency was 142.0 minutes. Arousal index was 46.8 arousals per hour. Sleep efficiency was 61.2%. Wake after sleep onset was 100.5 minutes. The patient spent 15.2% of total sleep time in Stage N1, 80.5% in Stage N2, 0.0% in Stage N3, and 4.3% in REM. Time in REM supine was 4.0 minutes.    Respiration: Moderate sleep disordered breathing best characterized as mixed (combination of both obstructive and central phenomena) with hypoxemia.  Central events did have a tendency towards periodic breathing consistent with patients history of heart disease. Of note the patient did have REM supine during the study.      Events ? The polysomnogram revealed a presence of 18 obstructive, 4 central, and - mixed apneas resulting in an apnea index of 5.1 events per hour. There were 59 obstructive hypopneas and - central  hypopneas resulting in an obstructive hypopnea index of 13.8 and central hypopnea index of - events per hour. The combined apnea/hypopnea index was 18.9 events per hour (central apnea/hypopnea index was 0.9 events per hour). The REM AHI was 32.7 events per hour. The supine AHI was 71.1 events per hour. The RERA index was 4.9 events per hour.  The RDI was 23.9 events per hour.    Snoring - was reported as mild-moderate.    Respiratory rate and pattern - was notable for normal and periodic respiratory rate and pattern.    Sustained Sleep Associated Hypoventilation - Transcutaneous carbon dioxide monitoring was used, however significant hypoventilation was not was not present with 0 minutes at or greater than 55 mmHg.    Sleep Associated Hypoxemia - (Greater than 5 minutes O2 sat at or below 88%) was present. Baseline oxygen saturation was 89.4%. Lowest oxygen saturation was 73.6%. Time spent less than or equal to 88% was 68.6 minutes. Time spent less than or equal to 89% was 109.6 minutes.    Movement Activity: The patient had elevated periodic limb movements (PLMs). The majority of these were not associated with cortical arousal.    Periodic Limb Activity - There were 363 PLMs during the entire study. The PLM index was 84.9 movements per hour. The PLM Arousal Index was 17.3 per hour.    REM EMG Activity - Excessive muscle activity was not present.    Nocturnal Behavior - Abnormal sleep related behaviors were not noted.    Bruxism - None apparent.    Cardiac Summary: Limited 1 lead EKG whose signal was intermittently obscured by artifact. Regardless the study demonstrated predominantly narrow QRS complexes preceded by P waves suggestive of sinus rhythm.    The average pulse rate was 67.9 bpm. The minimum pulse rate was 58.9 bpm while the maximum pulse rate was 91.1 bpm.      Assessment:     Moderate sleep disordered breathing best characterized as mixed (combination of both obstructive and central phenomena) with  hypoxemia.  Central events did have a tendency towards periodic breathing consistent with patients history of heart disease. Of note the patient did have REM supine during the study.      The patient had elevated periodic limb movements (PLMs). The majority of these were not associated with cortical arousal.  Recommendations:    Recommend first addressing the patients obstructive component of mixed apneas as the improved sleep consolidation often also corrects the central component as well.    o Treatment options include:   - Dental Appliance  - CPAP  - Upper Airway Surgery  - Recommend following up to ensure central events either resolve or if present could consider treatment with ASV if clinically appropriate.    o Advice regarding the risks of drowsy driving.    Suggest optimizing sleep schedule and avoiding sleep deprivation.    Weight management    Treatment of PLMs (dopaminergic agents or delta-2 ligands) should be targeted at patients who either have wakeful motor restlessness or those in whom there is a high clinical suspicion of periodic limb movement disorder and not for elevated PLMs alone.    Diagnostic Code(s): G47.33, G47.9      Erich Winters MD 6-

## 2019-06-18 LAB — SLPCOMP: NORMAL

## 2019-07-03 DIAGNOSIS — I48.91 ATRIAL FIBRILLATION (H): ICD-10-CM

## 2019-07-03 NOTE — TELEPHONE ENCOUNTER
"warfarin (COUMADIN) 5 MG tablet  Last Written Prescription Date:  4/11/19  Last Fill Quantity: 135,  # refills: 0   Last office visit: 5/21/2019 with prescribing provider:  Dalia    Future Office Visit:      Requested Prescriptions   Pending Prescriptions Disp Refills     warfarin (COUMADIN) 5 MG tablet [Pharmacy Med Name: WARFARIN SODIUM 5 MG TABLET] 135 tablet 0     Sig: TAKE 1-1.5 TABLETS (5-7.5 MG) BY MOUTH DAILY AS DIRECTED BY INR CLINIC       Vitamin K Antagonists Failed - 7/3/2019 12:32 PM        Failed - INR is within goal in the past 6 weeks     Confirm INR is within goal in the past 6 weeks.     Recent Labs   Lab Test 06/12/19   INR 2.5*                       Passed - Recent (12 mo) or future (30 days) visit within the authorizing provider's specialty     Patient had office visit in the last 12 months or has a visit in the next 30 days with authorizing provider or within the authorizing provider's specialty.  See \"Patient Info\" tab in inbasket, or \"Choose Columns\" in Meds & Orders section of the refill encounter.              Passed - Medication is active on med list        Passed - Patient is 18 years of age or older          "

## 2019-07-05 RX ORDER — WARFARIN SODIUM 5 MG/1
5-7.5 TABLET ORAL DAILY
Qty: 135 TABLET | Refills: 0 | Status: SHIPPED | OUTPATIENT
Start: 2019-07-05 | End: 2019-10-07

## 2019-07-08 DIAGNOSIS — F43.21 ADJUSTMENT DISORDER WITH DEPRESSED MOOD: ICD-10-CM

## 2019-07-09 RX ORDER — CLONAZEPAM 0.5 MG/1
TABLET ORAL
Qty: 60 TABLET | Refills: 0 | Status: SHIPPED | OUTPATIENT
Start: 2019-07-12 | End: 2019-08-06

## 2019-07-09 NOTE — TELEPHONE ENCOUNTER
clonazePAM (KLONOPIN) 0.5 MG tablet 60 tablet 0 6/12/2019  No   Sig - Route: Take 1 tablet (0.5 mg) by mouth 2 times daily as needed      PHQ-9 SCORE 5/21/2019   PHQ-9 Total Score 0     Last Written Prescription Date:  06/12/2019  Last Fill Quantity: 60,  # refills: 0   Last office visit: 5/21/2019 with prescribing provider:     Future Office Visit:        Routing refill request to provider for review/approval because:  Drug not on the [facility], P or [facility] refill protocol or controlled substance

## 2019-07-09 NOTE — TELEPHONE ENCOUNTER
This needs to be reviewed by PCP. Need documentation as to why he takes this.   VAMSI Osuna CNP    Cheiloplasty (Less Than 50%) Text: A decision was made to reconstruct the defect with a  cheiloplasty.  The defect was undermined extensively.  Additional obicularis oris muscle was excised with a 15 blade scalpel.  The defect was converted into a full thickness wedge, of less than 50% of the vertical height of the lip, to facilite a better cosmetic result.  Small vessels were then tied off with 5-0 monocyrl. The obicularis oris, superficial fascia, adipose and dermis were then reapproximated.  After the deeper layers were approximated the epidermis was reapproximated with particular care given to realign the vermilion border.

## 2019-07-10 DIAGNOSIS — I10 ESSENTIAL HYPERTENSION: Primary | ICD-10-CM

## 2019-07-10 NOTE — TELEPHONE ENCOUNTER
Historical in chart    Pending Prescriptions:                       Disp   Refills    diltiazem ER COATED BEADS (CARDIZEM CD/CA*90 cap*             Sig: Take 1 capsule (300 mg) by mouth daily          Last Written Prescription Date:  historical  Last Fill Quantity: -,   # refills: -  Last Office Visit: 5-21-19 [facility]  Future Office visit:       Routing refill request to provider for review/approval because:  Medication is reported/historical    Africa Kruger RT (R)

## 2019-07-11 RX ORDER — DILTIAZEM HYDROCHLORIDE 300 MG/1
300 CAPSULE, COATED, EXTENDED RELEASE ORAL DAILY
Qty: 90 CAPSULE | Refills: 0 | Status: SHIPPED | OUTPATIENT
Start: 2019-07-11 | End: 2020-02-10

## 2019-07-13 PROBLEM — M25.511 SHOULDER PAIN, RIGHT: Status: RESOLVED | Noted: 2019-04-18 | Resolved: 2019-07-13

## 2019-07-13 PROBLEM — M75.41 IMPINGEMENT SYNDROME, SHOULDER, RIGHT: Status: RESOLVED | Noted: 2019-04-18 | Resolved: 2019-07-13

## 2019-07-14 NOTE — PROGRESS NOTES
Discharge Note    Progress reporting period is from initial eval to May 2, 2019.     Erich failed to return for next follow up visit and current status is unknown.  Please see information below for last relevant information on current status.  Patient seen for Rxs Used: 2 visits.    SUBJECTIVE  Subjective changes noted by patient:  Subjective: Patient returns to therapy after two weeks on his own and patient notes that his shoulder gets stiff after his HEP but incrementally it is feeling better.  Pain notes less pain throughout his day that has slowly been improving.  Patient had to leave early for a dentist appointment today. .  Current pain level is  .     Previous pain level was  Initial Pain level: 2/10.   Changes in function:  Yes (See Goal flowsheet attached for changes in current functional level)  Adverse reaction to treatment or activity: None    OBJECTIVE  Changes noted in objective findings: Objective: RT flexion: 120 degrees, pain level 3/10. Decreased pain and increased ROM noted with therapist assistance of scapular upward rotation-shoulder depression. Abduction 5/5 2/10 pain, adduction 5/5 with 2/10 pain.  Patient uses compensatory UT involvement to perform shoulder elevation requiring moderate cueing to perform correctly. .    ASSESSMENT/PLAN  Diagnosis: Shoulder pain   DIAGP:  Diagnoses of Chronic right shoulder pain and Impingement syndrome, shoulder, right were pertinent to this visit.  Updated problem list and treatment plan:       STG/LTGs have been met or progress has been made towards goals:  Yes, please see goal flowsheet for most current information.    Assessment of Progress: current status is unknown.  Last current status: Assessment of progress: Pt is progressing as expected.  Self Management Plans:  HEP    I have re-evaluated this patient and find that the nature, scope, duration and intensity of the therapy is appropriate for the medical condition of the patient.  Erich continues to  require the following intervention to meet STG and LTG's:  HEP.    Recommendations:  Discharge with current home program.  Patient to follow up with MD as needed. Episode to be closed at this time and patient formally discharged from therapy.    Kevin Busby D.P.T., CSCS

## 2019-07-15 ENCOUNTER — ANTICOAGULATION THERAPY VISIT (OUTPATIENT)
Dept: NURSING | Facility: CLINIC | Age: 79
End: 2019-07-15
Payer: MEDICARE

## 2019-07-15 LAB — INR POINT OF CARE: 2.1 (ref 0.86–1.14)

## 2019-07-15 PROCEDURE — 85610 PROTHROMBIN TIME: CPT | Mod: QW

## 2019-07-15 PROCEDURE — 36416 COLLJ CAPILLARY BLOOD SPEC: CPT

## 2019-07-15 PROCEDURE — 99207 ZZC NO CHARGE NURSE ONLY: CPT

## 2019-07-15 NOTE — PROGRESS NOTES
ANTICOAGULATION FOLLOW-UP CLINIC VISIT    Patient Name:  Erich Craven  Date:  7/15/2019  Contact Type:  Face to Face    SUBJECTIVE:  Patient Findings     Comments:   The patient was assessed for diet, medication, and activity level changes, missed or extra doses, bruising or bleeding, with no problem findings.        Clinical Outcomes     Comments:   The patient was assessed for diet, medication, and activity level changes, missed or extra doses, bruising or bleeding, with no problem findings.           OBJECTIVE    INR Protime   Date Value Ref Range Status   07/15/2019 2.1 (A) 0.86 - 1.14 Final       ASSESSMENT / PLAN  INR assessment THER    Recheck INR In: 4 WEEKS    INR Location Clinic      Anticoagulation Summary  As of 7/15/2019    INR goal:   2.0-3.0   TTR:   75.1 % (3.1 mo)   INR used for dosin.1 (7/15/2019)   Warfarin maintenance plan:   7.5 mg (5 mg x 1.5) every Mon, Wed, Fri; 5 mg (5 mg x 1) all other days   Full warfarin instructions:   7.5 mg every Mon, Wed, Fri; 5 mg all other days   Weekly warfarin total:   42.5 mg   No change documented:   Delia Jang RN   Plan last modified:   Lanny Spivey RN (2019)   Next INR check:   2019   Target end date:            Anticoagulation Episode Summary     INR check location:       Preferred lab:       Send INR reminders to:   SERENITY BECERRA    Comments:         Anticoagulation Care Providers     Provider Role Specialty Phone number    Dontrell Gómez MD Referring Internal Medicine [phone]            See the Encounter Report to view Anticoagulation Flowsheet and Dosing Calendar (Go to Encounters tab in chart review, and find the Anticoagulation Therapy Visit)    Pt INR is 2.1 today. Pt advised to continue the maintenance dose of 7.5 mg on , ,  and 5 mg all the other days. Recheck INR in 4 weeks. Jaun aware if signs of clotting (pain, tenderness, swelling, color change in leg or arm, SOB) and bleeding occur (blood  in stool, urine, large bruising, bleeding gums, nosebleeds) to have INR check sooner. If sx severe report to ER or concerned for stroke call 911. If general questions or concerns arise, call clinic.       Delia Jang RN

## 2019-07-17 ENCOUNTER — OFFICE VISIT (OUTPATIENT)
Dept: SLEEP MEDICINE | Facility: CLINIC | Age: 79
End: 2019-07-17
Payer: MEDICARE

## 2019-07-17 VITALS
HEIGHT: 72 IN | WEIGHT: 292 LBS | RESPIRATION RATE: 16 BRPM | SYSTOLIC BLOOD PRESSURE: 129 MMHG | DIASTOLIC BLOOD PRESSURE: 77 MMHG | OXYGEN SATURATION: 92 % | HEART RATE: 83 BPM | BODY MASS INDEX: 39.55 KG/M2

## 2019-07-17 DIAGNOSIS — G47.34 NOCTURNAL HYPOXEMIA: ICD-10-CM

## 2019-07-17 DIAGNOSIS — G47.33 OSA (OBSTRUCTIVE SLEEP APNEA): Primary | ICD-10-CM

## 2019-07-17 PROCEDURE — 99214 OFFICE O/P EST MOD 30 MIN: CPT | Performed by: PHYSICIAN ASSISTANT

## 2019-07-17 RX ORDER — ZOLPIDEM TARTRATE 5 MG/1
TABLET ORAL
Qty: 1 TABLET | Refills: 0 | Status: SHIPPED | OUTPATIENT
Start: 2019-07-17 | End: 2019-07-17

## 2019-07-17 RX ORDER — ZOLPIDEM TARTRATE 5 MG/1
5 TABLET ORAL
Qty: 1 TABLET | Refills: 0 | Status: SHIPPED | OUTPATIENT
Start: 2019-07-17 | End: 2019-08-20

## 2019-07-17 ASSESSMENT — MIFFLIN-ST. JEOR: SCORE: 2072.5

## 2019-07-17 NOTE — PATIENT INSTRUCTIONS
Your BMI is Body mass index is 39.6 kg/m .  Weight management is a personal decision.  If you are interested in exploring weight loss strategies, the following discussion covers the approaches that may be successful. Body mass index (BMI) is one way to tell whether you are at a healthy weight, overweight, or obese. It measures your weight in relation to your height.  A BMI of 18.5 to 24.9 is in the healthy range. A person with a BMI of 25 to 29.9 is considered overweight, and someone with a BMI of 30 or greater is considered obese. More than two-thirds of American adults are considered overweight or obese.  Being overweight or obese increases the risk for further weight gain. Excess weight may lead to heart disease and diabetes.  Creating and following plans for healthy eating and physical activity may help you improve your health.  Weight control is part of healthy lifestyle and includes exercise, emotional health, and healthy eating habits. Careful eating habits lifelong are the mainstay of weight control. Though there are significant health benefits from weight loss, long-term weight loss with diet alone may be very difficult to achieve- studies show long-term success with dietary management in less than 10% of people. Attaining a healthy weight may be especially difficult to achieve in those with severe obesity. In some cases, medications, devices and surgical management might be considered.  What can you do?  If you are overweight or obese and are interested in methods for weight loss, you should discuss this with your provider.     Consider reducing daily calorie intake by 500 calories.     Keep a food journal.     Avoiding skipping meals, consider cutting portions instead.    Diet combined with exercise helps maintain muscle while optimizing fat loss. Strength training is particularly important for building and maintaining muscle mass. Exercise helps reduce stress, increase energy, and improves fitness.  Increasing exercise without diet control, however, may not burn enough calories to loose weight.       Start walking three days a week 10-20 minutes at a time    Work towards walking thirty minutes five days a week     Eventually, increase the speed of your walking for 1-2 minutes at time    In addition, we recommend that you review healthy lifestyles and methods for weight loss available through the National Institutes of Health patient information sites:  http://win.niddk.nih.gov/publications/index.htm    And look into health and wellness programs that may be available through your health insurance provider, employer, local community center, or annie club.    Weight management plan: Patient was referred to their PCP to discuss a diet and exercise plan.

## 2019-07-17 NOTE — NURSING NOTE
Chief Complaint   Patient presents with     Study Results     PSG f/u       Initial /77   Pulse 83   Resp 16   Ht 1.829 m (6')   Wt 132.5 kg (292 lb)   SpO2 92%   BMI 39.60 kg/m   Estimated body mass index is 39.6 kg/m  as calculated from the following:    Height as of this encounter: 1.829 m (6').    Weight as of this encounter: 132.5 kg (292 lb).    Medication Reconciliation: complete     ESS 2  Daria Brizuela

## 2019-07-17 NOTE — PROGRESS NOTES
Sleep Study Follow-Up Visit:    Date on this visit: 7/17/2019    Erich Craven comes in today for follow-up of his sleep study done on 6/16/2019 at the [facility] for previously diagnosed RIN and waking up feeling exhausted, since he moved to [place] last December.  His medical history is significant for paroxysmal atrial fibrillation, cardiac pacemaker, HFpEF, CAD, HTN, morbid obesity, PE (2012), COPD, hyperlipidemia, hypothyroidism, elevated right diaphragm.    Sleep latency 51.5 minutes without Ambien.  REM achieved.   REM latency 142 minutes.  Sleep efficiency 61.2%. Total sleep time 256.5 minutes.    Sleep architecture:  Stage 1, 15.2% (5%), stage 2, 80.5% (45-55%), stage 3, 0% (15-20%), stage REM, 4.3% (20-25%).  AHI was 18.9/hr (4.9% centrals with a tendency towards periodic breathing), with desaturations down to 74%. He spent 109.6 minutes below 90% SpO2 and 68.6 minutes below 89%. RDI 23.9/hr.  REM RDI 32.7/hr, consistent with severe REM RIN.  Supine RDI 71.1/hr, consistent with severe SUPINE RIN. His non-supine RDI was 18/hr.  Periodic Limb Movement Index 84.9/hour, 17.3/hr were associated with arousals.  CO2 ranged from 42 to 45 mmHg, not suggestive of hypoventilation. His O2 baseline was 87-90% for the first half of the night. One lpm O2 was added to bring his SpO2 to 90% or better.     CPAP titration:  CPAP was not initiated. These findings were reviewed with the patient.    Past medical/surgical history, family history, social history, medications and allergies were reviewed.      Problem List:  Patient Active Problem List    Diagnosis Date Noted     Muscle tension headache 05/21/2019     Priority: Medium     PE (pulmonary thromboembolism) (H)      Priority: Medium     HTN (hypertension)      Priority: Medium     Chronic diastolic heart failure (H)      Priority: Medium     Dyspnea      Priority: Medium     Fatigue      Priority: Medium     RIN (obstructive sleep apnea)       Priority: Medium     COPD (chronic obstructive pulmonary disease) (H)      Priority: Medium     Hypothyroidism      Priority: Medium     Paroxysmal A-fib (H)      Priority: Medium     HLD (hyperlipidemia)      Priority: Medium     Lymphedema      Priority: Medium     (HFpEF) heart failure with preserved ejection fraction (H)      Priority: Medium     Essential hypertension      Priority: Medium     Cardiac pacemaker in situ      Priority: Medium     DVT (deep venous thrombosis) (H)      Priority: Medium     CAD (coronary artery disease)      Priority: Medium     PCI w/ SUMMER in 2014       Obesity, Class III, BMI 40-49.9 (morbid obesity) (H)      Priority: Medium     Morbid obesity (H) 04/14/2019     Priority: Medium        Impression/Plan:    (G47.33) RIN (obstructive sleep apnea)  (primary encounter diagnosis), (G47.34) Nocturnal hypoxemia  Comment: This study showed moderate RIN on average, AHI 19/h (4% centrals with a periodic pattern).  His supine RDI was 71/h and REM RDI 32/h.  His AHI may be underestimated as he only had 4% of the night and REM sleep.  He had an O2 baseline of 87-90% for most of the night.  In the last part of the night, he was started on 1 L/min of supplemental oxygen to keep his oxygen at 90% or better. LVEF 66%. He has a dental appliance, but he is told it likely would not help his apnea much due to obesity and severity of apnea when supine.   Plan: Comprehensive Sleep Study  We discussed treatment options. We could prescribe CPAP and follow his residual AHI and hypoxemia, but he would require a repeat test if he had residual central apnea or hypoxemia to qualify him for ASV or supplemental oxygen. Given that he has not tolerated CPAP in the past, I recommended he come in for another study to help work on finding a comfortable mask and identify the best treatment.         Order placed for: Full night CPAP/O2/ ASV titration. A prescription of zolpidem was sent to his pharmacy for the  study.      He will follow up with me in about 2 week(s).     Twenty-five minutes spent with patient, all of which were spent face-to-face counseling, consulting, coordinating plan of care.      Bennett Goltz, PA-C    CC: Brent Portillo MD Gary L. Brunkow, MD

## 2019-07-22 ENCOUNTER — OFFICE VISIT (OUTPATIENT)
Dept: FAMILY MEDICINE | Facility: CLINIC | Age: 79
End: 2019-07-22
Payer: MEDICARE

## 2019-07-22 ENCOUNTER — ANCILLARY PROCEDURE (OUTPATIENT)
Dept: GENERAL RADIOLOGY | Facility: CLINIC | Age: 79
End: 2019-07-22
Attending: FAMILY MEDICINE
Payer: MEDICARE

## 2019-07-22 VITALS
RESPIRATION RATE: 16 BRPM | OXYGEN SATURATION: 98 % | SYSTOLIC BLOOD PRESSURE: 159 MMHG | HEART RATE: 74 BPM | DIASTOLIC BLOOD PRESSURE: 92 MMHG | BODY MASS INDEX: 40.52 KG/M2 | HEIGHT: 72 IN | TEMPERATURE: 97.5 F | WEIGHT: 299.19 LBS

## 2019-07-22 DIAGNOSIS — M25.562 ACUTE PAIN OF LEFT KNEE: ICD-10-CM

## 2019-07-22 DIAGNOSIS — M25.562 ACUTE PAIN OF LEFT KNEE: Primary | ICD-10-CM

## 2019-07-22 DIAGNOSIS — M17.12 PRIMARY OSTEOARTHRITIS OF LEFT KNEE: ICD-10-CM

## 2019-07-22 PROCEDURE — 73562 X-RAY EXAM OF KNEE 3: CPT | Mod: LT

## 2019-07-22 PROCEDURE — 99214 OFFICE O/P EST MOD 30 MIN: CPT | Performed by: FAMILY MEDICINE

## 2019-07-22 ASSESSMENT — PAIN SCALES - GENERAL: PAINLEVEL: NO PAIN (0)

## 2019-07-22 ASSESSMENT — MIFFLIN-ST. JEOR: SCORE: 2105.11

## 2019-07-22 NOTE — NURSING NOTE
Chief Complaint   Patient presents with     Musculoskeletal Problem     Left Knee pain     BP (!) 159/92 (BP Location: Right arm, Patient Position: Sitting, Cuff Size: Adult Large)   Pulse 74   Temp 97.5  F (36.4  C) (Oral)   Resp 16   Ht 1.829 m (6')   Wt 135.7 kg (299 lb 3 oz)   SpO2 98%   BMI 40.58 kg/m   Estimated body mass index is 40.58 kg/m  as calculated from the following:    Height as of this encounter: 1.829 m (6').    Weight as of this encounter: 135.7 kg (299 lb 3 oz).  bp completed using cuff size: large      Health Maintenance addressed:  NONE    N/a  Eva Valencia CMA on 7/22/2019 at 10:14 AM

## 2019-07-22 NOTE — PATIENT INSTRUCTIONS
Patient Education     RICE     Rest an injury, elevate it, and use ice and compression as directed.   RICE stands for rest, ice, compression, and elevation. These can limit pain and swelling after an injury. RICE may be recommended to help treat breaks (fractures), sprains, strains, and bruises or bumps.   Home care  Here are the details of RICE:    Rest. Limit the use of the injured body part. This helps prevent further damage to the body part and gives it time to heal. In some cases, you may need a sling, brace, splint, or cast to help keep the body part still until it has healed.    Ice. Applying ice right after an injury helps relieve pain and swelling. To make an ice pack, put ice cubes in a plastic bag that seals at the top. Wrap the bag in a clean, thin towel or cloth. Then place it over the injured area. Do this for 10 to 15 minutes every 3 to 4 hours. Continue for the next 1 to 3 days or until your symptoms improve. Never put ice directly on your skin. Don't ice an area longer than 15 minutes at a time.    Compression. Putting pressure on an injury helps reduce swelling and provides support. Wrap the injured area firmly with an elastic bandage or wrap. Make sure not to wrap the bandage too tightly or you will cut off blood flow to the injured area. If your bandage loosens, rewrap it.    Elevation. Keeping an injury raised or elevated above the level of your heart reduces swelling, pain, and throbbing. For instance, if you have a broken leg, it may help to rest your leg on several pillows when sitting or lying down. Try to keep the injured area elevated as often as possible.  Follow-up care  Follow up with your healthcare provider, or as advised.  When to seek medical advice  Call your healthcare provider right away if any of these occur:    Fever of 100.4 F (38 C) or higher, or as directed by your healthcare provider    Chills    Increased pain or swelling in the injured body part    Injured body part  becomes cold, blue, numb, or tingly    Signs of infection. These include warmth in the skin, redness, drainage, or bad smell coming from the injured body part.  Date Last Reviewed: 6/1/2018 2000-2018 [employer]. [address] [place] [address]. All rights reserved. This information is not intended as a substitute for professional medical care. Always follow your healthcare professional's instructions.

## 2019-07-23 ENCOUNTER — THERAPY VISIT (OUTPATIENT)
Dept: PHYSICAL THERAPY | Facility: CLINIC | Age: 79
End: 2019-07-23
Attending: FAMILY MEDICINE
Payer: MEDICARE

## 2019-07-23 DIAGNOSIS — M25.562 ACUTE PAIN OF LEFT KNEE: ICD-10-CM

## 2019-07-23 DIAGNOSIS — M17.12 PRIMARY OSTEOARTHRITIS OF LEFT KNEE: ICD-10-CM

## 2019-07-23 DIAGNOSIS — M25.562 LEFT KNEE PAIN: ICD-10-CM

## 2019-07-23 PROCEDURE — 97110 THERAPEUTIC EXERCISES: CPT | Mod: GP | Performed by: PHYSICAL THERAPIST

## 2019-07-23 PROCEDURE — 97161 PT EVAL LOW COMPLEX 20 MIN: CPT | Mod: GP | Performed by: PHYSICAL THERAPIST

## 2019-07-23 PROCEDURE — 97140 MANUAL THERAPY 1/> REGIONS: CPT | Mod: GP | Performed by: PHYSICAL THERAPIST

## 2019-07-23 NOTE — PROGRESS NOTES
[facility] Initial Evaluation  Subjective:    Type of problem:  Left knee   Condition occurred with:  Insidious onset. This is a new condition   Problem details: Patient's Chief Complaint: acute L knee pain starting Sunday 7/21/2019 for unknown reasons.  Pt reports that earlier in the day, [place] [employer] [place]  Pain started later that evening.      Date of Onset: 7/21/2019    Pain rating is: range from 4-8/10 pain    Quality of pain is sharp and frequency is intermittent    Pain dependence on time of day is: same    Progression of symptoms: same    Imaging/Special Tests include: xrays - showing DJD and OA medial knee    Previous treatments include: no    Patient reports general health is: fair    Pertinent Medical History includes: Heart problems, high blood pressure  Surgical History includes: R TKA in 1972, heart surgery for stents and pacemaker 2014-15      .   Patient reports pain:  Medial and in the joint. Radiates to:  Knee. Associated symptoms:  Loss of motion/stiffness. Symptoms are exacerbated by bending/squatting, ascending stairs, descending stairs, walking, standing and weight bearing and relieved by rest (tylenol, sitting).                      Objective:    Gait:  Lacks TKE with gait and with stance on L knee      Deviations:  Knee:  Knee extension decr L                                                      Knee Evaluation:  ROM:  Strength:  Normal (inhibited L VMO noted)  AROM    Hyperextension: Left:     Right: 3  Extension:  Left: -3+    Right:  0  Flexion: Left: 124+    Right: 126  PROM      Extension: Left: -3 firm endfeel with hyperextension   Right:   Flexion: Left: 124 firm endfeel   Right:         Ligament Testing:  Not Assessed                  Palpation:    Left knee tenderness present at:  Medial Joint Line    Edema:  Normal            General     ROS    Assessment/Plan:    Patient is a 79 year old male with left side knee  complaints.    Patient has the following significant findings with corresponding treatment plan.                Diagnosis 1:  L acute knee pain/OA  Pain -  manual therapy, self management, education and home program  Decreased ROM/flexibility - manual therapy, therapeutic exercise, therapeutic activity and home program  Decreased joint mobility - manual therapy, therapeutic exercise, therapeutic activity and home program  Decreased strength - therapeutic exercise, therapeutic activities and home program  Impaired gait - gait training, assistive devices and home program  Decreased function - therapeutic activities and home program    Therapy Evaluation Codes:   1) History comprised of:   Personal factors that impact the plan of care:      Age.    Comorbidity factors that impact the plan of care are:      Heart problems, High blood pressure, Overweight and Sleep disorder/apnea.     Medications impacting care: None.  2) Examination of Body Systems comprised of:   Body structures and functions that impact the plan of care:      Knee.   Activity limitations that impact the plan of care are:      Stairs, Standing and Walking.  3) Clinical presentation characteristics are:   Stable/Uncomplicated.  4) Decision-Making    Low complexity using standardized patient assessment instrument and/or measureable assessment of functional outcome.  Cumulative Therapy Evaluation is: Low complexity.    Previous and current functional limitations:  (See Goal Flow Sheet for this information)    Short term and Long term goals: (See Goal Flow Sheet for this information)     Communication ability:  Patient appears to be able to clearly communicate and understand verbal and written communication and follow directions correctly.  Treatment Explanation - The following has been discussed with the patient:   RX ordered/plan of care  Anticipated outcomes  Possible risks and side effects  This patient would benefit from PT intervention to resume  normal activities.   Rehab potential is good.    Frequency:  1 X week, once daily  Duration:  for 8 weeks  Discharge Plan:  Achieve all LTG.  Independent in home treatment program.  Reach maximal therapeutic benefit.    Please refer to the daily flowsheet for treatment today, total treatment time and time spent performing 1:1 timed codes.

## 2019-07-23 NOTE — LETTER
DEPARTMENT OF HEALTH AND HUMAN SERVICES  [facility]    PLAN/UPDATED PLAN OF PROGRESS FOR OUTPATIENT REHABILITATION    PATIENTS NAME:  Erich Craven     : 1940    PROVIDER NUMBER:    [id number]    HICN: [id number]    PROVIDER NAME: [facility]    MEDICAL RECORD NUMBER: [id number]     START OF CARE DATE:  SOC Date: 19   TYPE:  PT    PRIMARY/TREATMENT DIAGNOSIS: (Pertinent Medical Diagnosis)     Acute pain of left knee  Primary osteoarthritis of left knee  Left knee pain    VISITS FROM START OF CARE:  Rxs Used: 1     [facility] Initial Evaluation  Subjective:    Type of problem:  Left knee   Condition occurred with:  Insidious onset. This is a new condition   Problem details: Patient's Chief Complaint: acute L knee pain starting 2019 for unknown reasons.  Pt reports that earlier in the day, was [place] more [place] [employer] [place] with his [place].  Pain started later that evening.      Date of Onset: 2019    Pain rating is: range from 4-8/10 pain    Quality of pain is sharp and frequency is intermittent    Pain dependence on time of day is: same    Progression of symptoms: same    Imaging/Special Tests include: xrays - showing DJD and OA medial knee    Previous treatments include: no    Patient reports general health is: fair    Pertinent Medical History includes: Heart problems, high blood pressure  Surgical History includes: R TKA in , heart surgery for stents and pacemaker 2014-15  PATIENTS NAME:  Erich Craven   : 1940    .   Patient reports pain:  Medial and in the joint. Radiates to:  Knee. Associated symptoms:  Loss of motion/stiffness. Symptoms are exacerbated by bending/squatting, ascending stairs, descending stairs, walking, standing and weight bearing and relieved by rest (tylenol, sitting).    Objective:  Gait:  Lacks TKE with gait and with stance on L  knee    Deviations:  Knee:  Knee extension decr L       Knee Evaluation:  ROM:  Strength:  Normal (inhibited L VMO noted)  AROM  Hyperextension: Left:     Right: 3  Extension:  Left: -3+    Right:  0  Flexion: Left: 124+    Right: 126  PROM  Extension: Left: -3 firm endfeel with hyperextension   Right:   Flexion: Left: 124 firm endfeel   Right:     Ligament Testing:  Not Assessed    Palpation:    Left knee tenderness present at:  Medial Joint Line    Edema:  Normal    Assessment/Plan:    Patient is a 79 year old male with left side knee complaints.    Patient has the following significant findings with corresponding treatment plan.                Diagnosis 1:  L acute knee pain/OA  Pain -  manual therapy, self management, education and home program  Decreased ROM/flexibility - manual therapy, therapeutic exercise, therapeutic activity and home program  Decreased joint mobility - manual therapy, therapeutic exercise, therapeutic activity and home program  Decreased strength - therapeutic exercise, therapeutic activities and home program  Impaired gait - gait training, assistive devices and home program  Decreased function - therapeutic activities and home program    Therapy Evaluation Codes:   1) History comprised of:   Personal factors that impact the plan of care:      Age.    Comorbidity factors that impact the plan of care are:      Heart problems, High blood pressure, Overweight and Sleep disorder/apnea.     Medications impacting care: None.  2) Examination of Body Systems comprised of:   Body structures and functions that impact the plan of care:      Knee.  PATIENTS NAME:  Erich Craven   : 1940     Activity limitations that impact the plan of care are:      Stairs, Standing and Walking.  3) Clinical presentation characteristics are:   Stable/Uncomplicated.  4) Decision-Making    Low complexity using standardized patient assessment instrument and/or measureable assessment of functional  "outcome.  Cumulative Therapy Evaluation is: Low complexity.    Previous and current functional limitations:  (See Goal Flow Sheet for this information)    Short term and Long term goals: (See Goal Flow Sheet for this information)     Communication ability:  Patient appears to be able to clearly communicate and understand verbal and written communication and follow directions correctly.  Treatment Explanation - The following has been discussed with the patient:   RX ordered/plan of care  Anticipated outcomes  Possible risks and side effects  This patient would benefit from PT intervention to resume normal activities.   Rehab potential is good.    Frequency:  1 X week, once daily  Duration:  for 8 weeks  Discharge Plan:  Achieve all LTG.  Independent in home treatment program.  Reach maximal therapeutic benefit.    Please refer to the daily flowsheet for treatment today, total treatment time and time spent performing 1:1 timed codes.         Caregiver Signature/Credentials _____________________________ Date ________       Treating Provider: Feliberto Smith, PT   I have reviewed and certified the need for these services and plan of treatment while under my care.        PHYSICIAN'S SIGNATURE:   _________________________________________  Date___________   Donal Conley MD    Certification period:  Beginning of Cert date period: 07/23/19 to  End of Cert period date: 09/21/19     Functional Level Progress Report: Please see attached \"Goal Flow sheet for Functional level.\"    ____X____ Continue Services or       ________ DC Services                Service dates: From  SOC Date: 07/23/19 date to present                         "

## 2019-07-24 NOTE — RESULT ENCOUNTER NOTE
Results were communicated with the patient during his office visit.     Please call him and let him know that the radiologist states that same findings we discussed in the clinic.   Narrowing of the lateral compartment of the knee and advanced osteoarthritis of the knee cap (patellofermoral joint). I would recommend physical therapy.     If pain fails to improve, we can do steroid injections into the joint.     Donal Conley MD

## 2019-07-25 ENCOUNTER — TELEPHONE (OUTPATIENT)
Dept: FAMILY MEDICINE | Facility: CLINIC | Age: 79
End: 2019-07-25

## 2019-07-25 ASSESSMENT — ACTIVITIES OF DAILY LIVING (ADL)
STAND: ACTIVITY IS FAIRLY DIFFICULT
HOW_WOULD_YOU_RATE_THE_OVERALL_FUNCTION_OF_YOUR_KNEE_DURING_YOUR_USUAL_DAILY_ACTIVITIES?: SEVERELY ABNORMAL
SWELLING: I DO NOT HAVE THE SYMPTOM
KNEE_ACTIVITY_OF_DAILY_LIVING_SCORE: 47.14
KNEE_ACTIVITY_OF_DAILY_LIVING_SUM: 33
WALK: ACTIVITY IS FAIRLY DIFFICULT
RISE FROM A CHAIR: ACTIVITY IS SOMEWHAT DIFFICULT
HOW_WOULD_YOU_RATE_THE_CURRENT_FUNCTION_OF_YOUR_KNEE_DURING_YOUR_USUAL_DAILY_ACTIVITIES_ON_A_SCALE_FROM_0_TO_100_WITH_100_BEING_YOUR_LEVEL_OF_KNEE_FUNCTION_PRIOR_TO_YOUR_INJURY_AND_0_BEING_THE_INABILITY_TO_PERFORM_ANY_OF_YOUR_USUAL_DAILY_ACTIVITIES?: 25
SIT WITH YOUR KNEE BENT: ACTIVITY IS NOT DIFFICULT
LIMPING: THE SYMPTOM AFFECTS MY ACTIVITY SEVERELY
PAIN: THE SYMPTOM AFFECTS MY ACTIVITY SEVERELY
GO UP STAIRS: ACTIVITY IS VERY DIFFICULT
RAW_SCORE: 33
GIVING WAY, BUCKLING OR SHIFTING OF KNEE: I DO NOT HAVE THE SYMPTOM
WEAKNESS: I DO NOT HAVE THE SYMPTOM
SQUAT: I AM UNABLE TO DO THE ACTIVITY
STIFFNESS: THE SYMPTOM AFFECTS MY ACTIVITY MODERATELY
KNEEL ON THE FRONT OF YOUR KNEE: I AM UNABLE TO DO THE ACTIVITY
AS_A_RESULT_OF_YOUR_KNEE_INJURY,_HOW_WOULD_YOU_RATE_YOUR_CURRENT_LEVEL_OF_DAILY_ACTIVITY?: ABNORMAL
GO DOWN STAIRS: ACTIVITY IS VERY DIFFICULT

## 2019-07-25 NOTE — TELEPHONE ENCOUNTER
Left message for pt to call.    Alba Conley, Donal Juarez MD  [facility] [place] T[facility]             Results were communicated with the patient during his office visit.     Please call him and let him know that the radiologist states that same findings we discussed in the clinic.   Narrowing of the lateral compartment of the knee and advanced osteoarthritis of the knee cap (patellofermoral joint). I would recommend physical therapy.     If pain fails to improve, we can do steroid injections into the joint.     Donal Conley MD

## 2019-07-30 ENCOUNTER — THERAPY VISIT (OUTPATIENT)
Dept: PHYSICAL THERAPY | Facility: CLINIC | Age: 79
End: 2019-07-30
Payer: MEDICARE

## 2019-07-30 DIAGNOSIS — M25.562 LEFT KNEE PAIN: ICD-10-CM

## 2019-07-30 PROCEDURE — 97110 THERAPEUTIC EXERCISES: CPT | Mod: GP | Performed by: PHYSICAL THERAPIST

## 2019-08-06 ENCOUNTER — ANCILLARY PROCEDURE (OUTPATIENT)
Dept: CARDIOLOGY | Facility: CLINIC | Age: 79
End: 2019-08-06
Attending: INTERNAL MEDICINE
Payer: MEDICARE

## 2019-08-06 ENCOUNTER — THERAPY VISIT (OUTPATIENT)
Dept: PHYSICAL THERAPY | Facility: CLINIC | Age: 79
End: 2019-08-06
Payer: MEDICARE

## 2019-08-06 ENCOUNTER — OFFICE VISIT (OUTPATIENT)
Dept: FAMILY MEDICINE | Facility: CLINIC | Age: 79
End: 2019-08-06
Payer: MEDICARE

## 2019-08-06 VITALS
HEART RATE: 79 BPM | SYSTOLIC BLOOD PRESSURE: 132 MMHG | WEIGHT: 297 LBS | DIASTOLIC BLOOD PRESSURE: 82 MMHG | BODY MASS INDEX: 40.23 KG/M2 | HEIGHT: 72 IN | TEMPERATURE: 98.3 F | OXYGEN SATURATION: 94 %

## 2019-08-06 DIAGNOSIS — M25.562 LEFT KNEE PAIN: ICD-10-CM

## 2019-08-06 DIAGNOSIS — F43.21 ADJUSTMENT DISORDER WITH DEPRESSED MOOD: ICD-10-CM

## 2019-08-06 DIAGNOSIS — M25.562 ACUTE PAIN OF LEFT KNEE: Primary | ICD-10-CM

## 2019-08-06 DIAGNOSIS — L98.9 SKIN LESION: ICD-10-CM

## 2019-08-06 DIAGNOSIS — Z95.0 CARDIAC PACEMAKER IN SITU: ICD-10-CM

## 2019-08-06 PROCEDURE — 99213 OFFICE O/P EST LOW 20 MIN: CPT | Performed by: NURSE PRACTITIONER

## 2019-08-06 PROCEDURE — 97530 THERAPEUTIC ACTIVITIES: CPT | Mod: GP | Performed by: PHYSICAL THERAPIST

## 2019-08-06 PROCEDURE — 97110 THERAPEUTIC EXERCISES: CPT | Mod: GP | Performed by: PHYSICAL THERAPIST

## 2019-08-06 PROCEDURE — 93294 REM INTERROG EVL PM/LDLS PM: CPT | Performed by: INTERNAL MEDICINE

## 2019-08-06 PROCEDURE — 93296 REM INTERROG EVL PM/IDS: CPT | Performed by: INTERNAL MEDICINE

## 2019-08-06 RX ORDER — CLONAZEPAM 0.5 MG/1
TABLET ORAL
Qty: 60 TABLET | Refills: 0 | Status: SHIPPED | OUTPATIENT
Start: 2019-08-06 | End: 2019-09-04

## 2019-08-06 ASSESSMENT — MIFFLIN-ST. JEOR: SCORE: 2095.18

## 2019-08-06 NOTE — PROGRESS NOTES
Subjective     Erich Craven is a 79 year old male who presents to clinic today for the following health issues:    HPI     Chief Complaint   Patient presents with     Follow Up     Acute pain of left knee      Seen 2 weeks ago for knee pain in clinic.  Had left knee pain that was atraumtic and had negative x-ray for acute process except for arthritis at previous OV.  Was referred to PT, and he reports this has been going well.  Has third PT appointment today.  Pain has improved, and he is trying to avoid having a left knee replacement as he had his right knee replaced and developed a blood clot post surgery.    He also is interested in dermatology referral for skin condition to back of his right ear and top of his left ear.  Has scaly flakes to back of ears which he says has been removed by dermatology in the past.    Also, he is inquiring about having his Clonazepam refilled. Takes twice daily and has been on this medication for years.    Past Medical History:   Diagnosis Date     (HFpEF) heart failure with preserved ejection fraction (H)      BPH with urinary obstruction      CAD (coronary artery disease)     PCI w/ [facility] in 2014     Cardiac pacemaker in situ      Chronic diastolic heart failure (H)      COPD (chronic obstructive pulmonary disease) (H)      DVT (deep venous thrombosis) (H)      Dyspnea      Essential hypertension      Fatigue      HLD (hyperlipidemia)      Hypothyroidism      Lymphedema      Obesity, Class III, BMI 40-49.9 (morbid obesity) (H)      RIN (obstructive sleep apnea)     has refused CPAP     Paroxysmal A-fib (H)      PE (pulmonary thromboembolism) (H)      Family History   Problem Relation Age of Onset     Hypertension Father      Past Surgical History:   Procedure Laterality Date     cardiac stenting       ENT SURGERY      tonsillectomy     IMPLANT PACEMAKER       ORTHOPEDIC SURGERY Right 2012    knee replacement     Social History     Tobacco Use     Smoking status: Former Smoker      Packs/day: 0.50     Years: 25.00     Pack years: 12.50     Smokeless tobacco: Never Used     Tobacco comment: quit in 1989   Substance Use Topics     Alcohol use: No     Frequency: Never     Comment: quit in 1989     Current Outpatient Medications   Medication Sig Dispense Refill     Acetylcarnitine HCl (ACETYL L-CARNITINE) 500 MG CAPS Take 1 capsule by mouth 2 times daily       albuterol (PROAIR HFA/PROVENTIL HFA/VENTOLIN HFA) 108 (90 Base) MCG/ACT inhaler Inhale 2 puffs into the lungs every 4 hours as needed       aspirin (ASA) 81 MG chewable tablet Take 1 tablet by mouth daily       atorvastatin (LIPITOR) 40 MG tablet Take 1 tablet (40 mg) by mouth every evening 90 tablet 3     B Complex Vitamins (VITAMIN B COMPLEX PO) Take 1 capsule by mouth daily       CALCIUM PO Take 600 mg by mouth daily       carvedilol (COREG) 12.5 MG tablet Take 12.5 mg by mouth 2 times daily       Cholecalciferol (VITAMIN D3) 1000 units CAPS Take 1,000 Units by mouth daily       clonazePAM (KLONOPIN) 0.5 MG tablet TAKE 1 TABLET BY MOUTH TWICE A DAY AS NEEDED 60 tablet 0     co-enzyme Q-10 100 MG CAPS capsule Take 100 mg by mouth daily       diltiazem ER COATED BEADS (CARDIZEM CD/CARTIA XT) 300 MG 24 hr capsule Take 1 capsule (300 mg) by mouth daily 90 capsule 0     furosemide (LASIX) 20 MG tablet TAKE 3 TABLETS BY MOUTH TWICE DAILY. 540 tablet 0     isosorbide mononitrate (IMDUR) 30 MG 24 hr tablet Take 1 tablet (30 mg) by mouth daily 90 tablet 3     levothyroxine (SYNTHROID/LEVOTHROID) 175 MCG tablet Take 1 tablet by mouth daily Additional 1/2 tablet on Sundays       lisinopril (PRINIVIL/ZESTRIL) 40 MG tablet Take 40 mg by mouth daily       Magnesium 500 MG CAPS Take 1 capsule by mouth daily       metolazone (ZAROXOLYN) 2.5 MG tablet Take 1 tablet (2.5 mg) by mouth once a week On Thursday 30 tablet 11     Multiple vitamin TABS Take 1 tablet by mouth daily       potassium chloride ER (K-TAB/KLOR-CON) 10 MEQ CR tablet Take 10 mEq by mouth  2 times daily       tamsulosin (FLOMAX) 0.4 MG capsule Take 1 capsule (0.4 mg) by mouth daily 90 capsule 3     Taurine 500 MG CAPS Take 500 mg by mouth 2 times daily       venlafaxine (EFFEXOR) 37.5 MG tablet Take 37.5 mg by mouth 2 times daily       warfarin (COUMADIN) 5 MG tablet TAKE 1-1.5 TABLETS (5-7.5 MG) BY MOUTH DAILY AS DIRECTED BY INR CLINIC 135 tablet 0     zolpidem (AMBIEN) 5 MG tablet Take 1 tablet (5 mg) by mouth nightly as needed (for night of sleep study) 1 tablet 0     Allergies   Allergen Reactions     No Known Allergies        Reviewed and updated as needed this visit by clinical staff and provider     Review of Systems   Detailed as above    Objective    /82 (BP Location: Right arm, Patient Position: Sitting, Cuff Size: Adult Large)   Pulse 79   Temp 98.3  F (36.8  C) (Oral)   Ht 1.829 m (6')   Wt 134.7 kg (297 lb)   SpO2 94%   BMI 40.28 kg/m    Body mass index is 40.28 kg/m .  Physical Exam   HENT:   Head: Normocephalic.   Pulmonary/Chest: Effort normal.   Musculoskeletal: Normal range of motion.   Neurological: He is alert.   Skin: Skin is warm and dry.   White, flakes of skin to back of right ear.  One small, white, flake to top of left ear.   Psychiatric: His behavior is normal. Judgment normal.        Assessment and Plan:       ICD-10-CM    1. Acute pain of left knee M25.562    2. Skin lesion L98.9 DERMATOLOGY REFERRAL   3. Adjustment disorder with depressed mood F43.21 clonazePAM (KLONOPIN) 0.5 MG tablet     As his left pain is improving with PT, encouraged him to continue with them and follow their care plan.    Referral to dermatology for his ear skin lesions, and his clonazepam script was renewed and paper script was given to him, but he will be unable to refill medication until later this week. He should keep his upcoming appointment with PCP.    Pt seen in conjunction with Lennox Vail NP Student    Aditi Rhodes, APRN, CNP  [facility]

## 2019-08-12 LAB
MDC_IDC_EPISODE_DTM: NORMAL
MDC_IDC_EPISODE_DURATION: 10 S
MDC_IDC_EPISODE_DURATION: 13 S
MDC_IDC_EPISODE_DURATION: 16 S
MDC_IDC_EPISODE_DURATION: 5 S
MDC_IDC_EPISODE_DURATION: 5 S
MDC_IDC_EPISODE_DURATION: 6 S
MDC_IDC_EPISODE_DURATION: 7 S
MDC_IDC_EPISODE_DURATION: 8 S
MDC_IDC_EPISODE_DURATION: 9 S
MDC_IDC_EPISODE_ID: NORMAL
MDC_IDC_EPISODE_TYPE: NORMAL
MDC_IDC_LEAD_IMPLANT_DT: NORMAL
MDC_IDC_LEAD_IMPLANT_DT: NORMAL
MDC_IDC_LEAD_LOCATION: NORMAL
MDC_IDC_LEAD_LOCATION: NORMAL
MDC_IDC_LEAD_LOCATION_DETAIL_1: NORMAL
MDC_IDC_LEAD_LOCATION_DETAIL_1: NORMAL
MDC_IDC_LEAD_MFG: NORMAL
MDC_IDC_LEAD_MFG: NORMAL
MDC_IDC_LEAD_MODEL: NORMAL
MDC_IDC_LEAD_MODEL: NORMAL
MDC_IDC_LEAD_POLARITY_TYPE: NORMAL
MDC_IDC_LEAD_POLARITY_TYPE: NORMAL
MDC_IDC_LEAD_SERIAL: NORMAL
MDC_IDC_LEAD_SERIAL: NORMAL
MDC_IDC_MSMT_BATTERY_DTM: NORMAL
MDC_IDC_MSMT_BATTERY_REMAINING_LONGEVITY: 48 MO
MDC_IDC_MSMT_BATTERY_REMAINING_PERCENTAGE: 76 %
MDC_IDC_MSMT_BATTERY_STATUS: NORMAL
MDC_IDC_MSMT_LEADCHNL_RA_IMPEDANCE_VALUE: 522 OHM
MDC_IDC_MSMT_LEADCHNL_RA_PACING_THRESHOLD_AMPLITUDE: 0.4 V
MDC_IDC_MSMT_LEADCHNL_RA_PACING_THRESHOLD_PULSEWIDTH: 0.4 MS
MDC_IDC_MSMT_LEADCHNL_RV_IMPEDANCE_VALUE: 421 OHM
MDC_IDC_MSMT_LEADCHNL_RV_PACING_THRESHOLD_AMPLITUDE: 1.6 V
MDC_IDC_MSMT_LEADCHNL_RV_PACING_THRESHOLD_PULSEWIDTH: 0.4 MS
MDC_IDC_PG_IMPLANT_DTM: NORMAL
MDC_IDC_PG_MFG: NORMAL
MDC_IDC_PG_MODEL: NORMAL
MDC_IDC_PG_SERIAL: NORMAL
MDC_IDC_PG_TYPE: NORMAL
MDC_IDC_SESS_CLINIC_NAME: NORMAL
MDC_IDC_SESS_DTM: NORMAL
MDC_IDC_SESS_TYPE: NORMAL
MDC_IDC_SET_BRADY_AT_MODE_SWITCH_MODE: NORMAL
MDC_IDC_SET_BRADY_AT_MODE_SWITCH_RATE: 150 {BEATS}/MIN
MDC_IDC_SET_BRADY_LOWRATE: 60 {BEATS}/MIN
MDC_IDC_SET_BRADY_MAX_SENSOR_RATE: 130 {BEATS}/MIN
MDC_IDC_SET_BRADY_MAX_TRACKING_RATE: 130 {BEATS}/MIN
MDC_IDC_SET_BRADY_MODE: NORMAL
MDC_IDC_SET_BRADY_PAV_DELAY_HIGH: 220 MS
MDC_IDC_SET_BRADY_PAV_DELAY_LOW: 250 MS
MDC_IDC_SET_BRADY_SAV_DELAY_HIGH: 220 MS
MDC_IDC_SET_BRADY_SAV_DELAY_LOW: 250 MS
MDC_IDC_SET_LEADCHNL_RA_PACING_AMPLITUDE: 2 V
MDC_IDC_SET_LEADCHNL_RA_PACING_POLARITY: NORMAL
MDC_IDC_SET_LEADCHNL_RA_PACING_PULSEWIDTH: 0.4 MS
MDC_IDC_SET_LEADCHNL_RA_SENSING_ADAPTATION_MODE: NORMAL
MDC_IDC_SET_LEADCHNL_RA_SENSING_POLARITY: NORMAL
MDC_IDC_SET_LEADCHNL_RA_SENSING_SENSITIVITY: 0.5 MV
MDC_IDC_SET_LEADCHNL_RV_PACING_AMPLITUDE: 2.2 V
MDC_IDC_SET_LEADCHNL_RV_PACING_CAPTURE_MODE: NORMAL
MDC_IDC_SET_LEADCHNL_RV_PACING_POLARITY: NORMAL
MDC_IDC_SET_LEADCHNL_RV_PACING_PULSEWIDTH: 0.4 MS
MDC_IDC_SET_LEADCHNL_RV_SENSING_ADAPTATION_MODE: NORMAL
MDC_IDC_SET_LEADCHNL_RV_SENSING_POLARITY: NORMAL
MDC_IDC_SET_LEADCHNL_RV_SENSING_SENSITIVITY: 1 MV
MDC_IDC_SET_ZONE_DETECTION_INTERVAL: 375 MS
MDC_IDC_SET_ZONE_TYPE: NORMAL
MDC_IDC_SET_ZONE_VENDOR_TYPE: NORMAL
MDC_IDC_STAT_AT_BURDEN_PERCENT: 2 %
MDC_IDC_STAT_AT_DTM_END: NORMAL
MDC_IDC_STAT_AT_DTM_START: NORMAL
MDC_IDC_STAT_BRADY_DTM_END: NORMAL
MDC_IDC_STAT_BRADY_DTM_START: NORMAL
MDC_IDC_STAT_BRADY_RA_PERCENT_PACED: 2 %
MDC_IDC_STAT_BRADY_RV_PERCENT_PACED: 0 %
MDC_IDC_STAT_EPISODE_RECENT_COUNT: 0
MDC_IDC_STAT_EPISODE_RECENT_COUNT: 0
MDC_IDC_STAT_EPISODE_RECENT_COUNT: 13
MDC_IDC_STAT_EPISODE_RECENT_COUNT: 239
MDC_IDC_STAT_EPISODE_RECENT_COUNT_DTM_END: NORMAL
MDC_IDC_STAT_EPISODE_RECENT_COUNT_DTM_START: NORMAL
MDC_IDC_STAT_EPISODE_TYPE: NORMAL
MDC_IDC_STAT_EPISODE_VENDOR_TYPE: NORMAL

## 2019-08-15 ENCOUNTER — THERAPY VISIT (OUTPATIENT)
Dept: PHYSICAL THERAPY | Facility: CLINIC | Age: 79
End: 2019-08-15
Payer: MEDICARE

## 2019-08-15 DIAGNOSIS — M25.562 LEFT KNEE PAIN: ICD-10-CM

## 2019-08-15 PROCEDURE — 97530 THERAPEUTIC ACTIVITIES: CPT | Mod: GP | Performed by: PHYSICAL THERAPIST

## 2019-08-15 PROCEDURE — 97110 THERAPEUTIC EXERCISES: CPT | Mod: GP | Performed by: PHYSICAL THERAPIST

## 2019-08-16 ENCOUNTER — TELEPHONE (OUTPATIENT)
Dept: FAMILY MEDICINE | Facility: CLINIC | Age: 79
End: 2019-08-16

## 2019-08-16 NOTE — TELEPHONE ENCOUNTER
Received form(s) from [facility] Plan of Care for orders.  Placed form(s) in/on FS's desk.  Forms need to be signed and faxed to [phone]..    Call pt to verify form was sent: No  Copy needs to be sent for scanning after completion: Yes

## 2019-08-20 ENCOUNTER — OFFICE VISIT (OUTPATIENT)
Dept: FAMILY MEDICINE | Facility: CLINIC | Age: 79
End: 2019-08-20
Payer: MEDICARE

## 2019-08-20 ENCOUNTER — ANTICOAGULATION THERAPY VISIT (OUTPATIENT)
Dept: NURSING | Facility: CLINIC | Age: 79
End: 2019-08-20
Payer: MEDICARE

## 2019-08-20 VITALS
OXYGEN SATURATION: 94 % | TEMPERATURE: 98.3 F | HEART RATE: 79 BPM | BODY MASS INDEX: 40.63 KG/M2 | WEIGHT: 300 LBS | HEIGHT: 72 IN | DIASTOLIC BLOOD PRESSURE: 88 MMHG | SYSTOLIC BLOOD PRESSURE: 139 MMHG

## 2019-08-20 DIAGNOSIS — E66.01 MORBID OBESITY (H): ICD-10-CM

## 2019-08-20 DIAGNOSIS — M25.562 ACUTE PAIN OF LEFT KNEE: ICD-10-CM

## 2019-08-20 DIAGNOSIS — I48.0 PAROXYSMAL ATRIAL FIBRILLATION (H): Primary | ICD-10-CM

## 2019-08-20 DIAGNOSIS — M17.12 PRIMARY OSTEOARTHRITIS OF LEFT KNEE: ICD-10-CM

## 2019-08-20 LAB
ERYTHROCYTE [DISTWIDTH] IN BLOOD BY AUTOMATED COUNT: 15.3 % (ref 10–15)
HCT VFR BLD AUTO: 43.6 % (ref 40–53)
HGB BLD-MCNC: 14.3 G/DL (ref 13.3–17.7)
INR POINT OF CARE: 2.2 (ref 0.86–1.14)
MCH RBC QN AUTO: 32 PG (ref 26.5–33)
MCHC RBC AUTO-ENTMCNC: 32.8 G/DL (ref 31.5–36.5)
MCV RBC AUTO: 98 FL (ref 78–100)
PLATELET # BLD AUTO: 197 10E9/L (ref 150–450)
RBC # BLD AUTO: 4.47 10E12/L (ref 4.4–5.9)
WBC # BLD AUTO: 9.8 10E9/L (ref 4–11)

## 2019-08-20 PROCEDURE — 99207 ZZC NO CHARGE NURSE ONLY: CPT

## 2019-08-20 PROCEDURE — 85610 PROTHROMBIN TIME: CPT | Mod: QW

## 2019-08-20 PROCEDURE — 85027 COMPLETE CBC AUTOMATED: CPT | Performed by: INTERNAL MEDICINE

## 2019-08-20 PROCEDURE — 36416 COLLJ CAPILLARY BLOOD SPEC: CPT

## 2019-08-20 PROCEDURE — 80048 BASIC METABOLIC PNL TOTAL CA: CPT | Performed by: INTERNAL MEDICINE

## 2019-08-20 PROCEDURE — 99213 OFFICE O/P EST LOW 20 MIN: CPT | Performed by: INTERNAL MEDICINE

## 2019-08-20 ASSESSMENT — MIFFLIN-ST. JEOR: SCORE: 2108.79

## 2019-08-20 NOTE — LETTER
[facility]  [address] [facility]  [address]  [place]  [address]  Tel: [phone]    September 10, 2019    Erich Herber  [address]  [facility] [address]        Erich,    Enclosed your lab reports from your recent office exam.    Basic metabolic panel showed that your minerals electrolytes were all normal as were your kidney function tests.  The serum calcium as corrected for your albumin was normal as well.  The CBC showed that your blood cell counts were all normal. No sign of low blood or anemia.      Sincerely,    Dontrell Gómez MD          Enclosure: Lab Results  Results for orders placed or performed in visit on 08/20/19   CBC with platelets   Result Value Ref Range    WBC 9.8 4.0 - 11.0 10e9/L    RBC Count 4.47 4.4 - 5.9 10e12/L    Hemoglobin 14.3 13.3 - 17.7 g/dL    Hematocrit 43.6 40.0 - 53.0 %    MCV 98 78 - 100 fl    MCH 32.0 26.5 - 33.0 pg    MCHC 32.8 31.5 - 36.5 g/dL    RDW 15.3 (H) 10.0 - 15.0 %    Platelet Count 197 150 - 450 10e9/L   Basic metabolic panel   Result Value Ref Range    Sodium 142 133 - 144 mmol/L    Potassium 4.4 3.4 - 5.3 mmol/L    Chloride 107 94 - 109 mmol/L    Carbon Dioxide 29 20 - 32 mmol/L    Anion Gap 6 3 - 14 mmol/L    Glucose 88 70 - 99 mg/dL    Urea Nitrogen 19 7 - 30 mg/dL    Creatinine 0.78 0.66 - 1.25 mg/dL    GFR Estimate 85 >60 mL/min/[1.73_m2]    GFR Estimate If Black >90 >60 mL/min/[1.73_m2]    Calcium 8.4 (L) 8.5 - 10.1 mg/dL

## 2019-08-20 NOTE — PROGRESS NOTES
Subjective     Erich Craven is a 79 year old male who presents to clinic today for the following health issues:    HPI   Patient presents to clinic for follow up on his recent knee physical therapy. He is also discussing his sleep issue.     He states that he just finished his last PT visit. Reports that his knees are feeling good and that he is continuing with independent exercises such stationary bike.     Pt has gained 3 lbs since LOV. He is take 1 tablet of metolazone once a week on Thursdays.      Patient Active Problem List   Diagnosis     Morbid obesity (H)     PE (pulmonary thromboembolism) (H)     HTN (hypertension)     Chronic diastolic heart failure (H)     Dyspnea     Fatigue     RIN (obstructive sleep apnea)     COPD (chronic obstructive pulmonary disease) (H)     Hypothyroidism     Paroxysmal A-fib (H)     HLD (hyperlipidemia)     Lymphedema     (HFpEF) heart failure with preserved ejection fraction (H)     Essential hypertension     Cardiac pacemaker in situ     DVT (deep venous thrombosis) (H)     CAD (coronary artery disease)     Obesity, Class III, BMI 40-49.9 (morbid obesity) (H)     Muscle tension headache     Left knee pain     Past Surgical History:   Procedure Laterality Date     cardiac stenting       ENT SURGERY      tonsillectomy     IMPLANT PACEMAKER       ORTHOPEDIC SURGERY Right 2012    knee replacement       Social History     Tobacco Use     Smoking status: Former Smoker     Packs/day: 0.50     Years: 25.00     Pack years: 12.50     Smokeless tobacco: Never Used     Tobacco comment: quit in 1989   Substance Use Topics     Alcohol use: No     Frequency: Never     Comment: quit in 1989     Family History   Problem Relation Age of Onset     Hypertension Father          Current Outpatient Medications   Medication Sig Dispense Refill     Acetylcarnitine HCl (ACETYL L-CARNITINE) 500 MG CAPS Take 1 capsule by mouth 2 times daily       albuterol (PROAIR HFA/PROVENTIL HFA/VENTOLIN HFA) 108 (90  Base) MCG/ACT inhaler Inhale 2 puffs into the lungs every 4 hours as needed       aspirin (ASA) 81 MG chewable tablet Take 1 tablet by mouth daily       atorvastatin (LIPITOR) 40 MG tablet Take 1 tablet (40 mg) by mouth every evening 90 tablet 3     CALCIUM PO Take 600 mg by mouth daily       carvedilol (COREG) 12.5 MG tablet Take 12.5 mg by mouth 2 times daily       Cholecalciferol (VITAMIN D3) 1000 units CAPS Take 1,000 Units by mouth daily       clonazePAM (KLONOPIN) 0.5 MG tablet TAKE 1 TABLET BY MOUTH TWICE A DAY AS NEEDED 60 tablet 0     co-enzyme Q-10 100 MG CAPS capsule Take 100 mg by mouth daily       diltiazem ER COATED BEADS (CARDIZEM CD/CARTIA XT) 300 MG 24 hr capsule Take 1 capsule (300 mg) by mouth daily 90 capsule 0     furosemide (LASIX) 20 MG tablet TAKE 3 TABLETS BY MOUTH TWICE DAILY. 540 tablet 0     isosorbide mononitrate (IMDUR) 30 MG 24 hr tablet Take 1 tablet (30 mg) by mouth daily 90 tablet 3     levothyroxine (SYNTHROID/LEVOTHROID) 175 MCG tablet Take 1 tablet by mouth daily Additional 1/2 tablet on Sundays       lisinopril (PRINIVIL/ZESTRIL) 40 MG tablet Take 40 mg by mouth daily       metolazone (ZAROXOLYN) 2.5 MG tablet Take 1 tablet (2.5 mg) by mouth once a week On Thursday 30 tablet 11     Multiple vitamin TABS Take 1 tablet by mouth daily       potassium chloride ER (K-TAB/KLOR-CON) 10 MEQ CR tablet Take 10 mEq by mouth 2 times daily       tamsulosin (FLOMAX) 0.4 MG capsule Take 1 capsule (0.4 mg) by mouth daily 90 capsule 3     Taurine 500 MG CAPS Take 500 mg by mouth 2 times daily       venlafaxine (EFFEXOR) 37.5 MG tablet Take 37.5 mg by mouth 2 times daily       warfarin (COUMADIN) 5 MG tablet TAKE 1-1.5 TABLETS (5-7.5 MG) BY MOUTH DAILY AS DIRECTED BY INR CLINIC 135 tablet 0     Allergies   Allergen Reactions     No Known Allergies        Reviewed and updated as needed this visit by Provider         Review of Systems   ROS COMP: Constitutional, HEENT, cardiovascular, pulmonary, gi  and gu systems are negative, except as otherwise noted.    This document serves as a record of the services and decisions personally performed and made by Dontrell Gómez MD. It was created on his behalf by Patric Montero, a trained medical scribe. The creation of this document is based on the provider's statements to the medical scribe.  Patric Montero August 20, 2019 4:45 PM        Objective    /88 (BP Location: Right arm, Patient Position: Sitting, Cuff Size: Adult Large)   Pulse 79   Temp 98.3  F (36.8  C) (Oral)   Ht 1.829 m (6')   Wt 136.1 kg (300 lb)   SpO2 94%   BMI 40.69 kg/m    Body mass index is 40.69 kg/m .     Physical Exam   Neck was supple without adenopathy or thyromegaly his carotids were normal without bruits  Chest clear to auscultation and percussion except for an occasional expiratory squeak  Cardiovascular S1 and S2 are physiologic without murmurs or gallops  Abdomen obese bowel sounds were normal.  There is no palpable mass or organomegaly  Extremities nontender with 2-3+ pretibial edema  Pulses neck veins were not evaluatible pedal pulses are as described otherwise his pulses are bilaterally symmetrical throughout without bruits  Skin without significant abnormality    Diagnostic Test Results:  Labs reviewed in Epic  Results for orders placed or performed in visit on 08/20/19 (from the past 24 hour(s))   INR point of care   Result Value Ref Range    INR Protime 2.2 (A) 0.86 - 1.14           Assessment & Plan     Paroxysmal atrial fibrillation (H)  Recommended that his metolazone dosage increase to 1 tablet twice a week on Monday's and Thursday's.         2. Acute pain of left knee  Responding well to physical therapy    3. Primary osteoarthritis of left knee      4. Morbid obesity (H)  Hopefully his increased activity will help him to manage this better.  Also reviewed more conscientious diet      BMI:   Estimated body mass index is 40.69 kg/m  as calculated from the following:     Height as of this encounter: 1.829 m (6').    Weight as of this encounter: 136.1 kg (300 lb).   Weight management plan: Discussed healthy diet and exercise guidelines        FUTURE APPOINTMENTS:       - Follow-up visit in 2 weeks     No follow-ups on file.     The information in this document, created by the medical scribe for me, accurately reflects the services I personally performed and the decisions made by me. I have reviewed and approved this document for accuracy prior to leaving the patient care area.  August 20, 2019 5:11 PM      Dontrell Gómez MD  [facility]

## 2019-08-20 NOTE — PATIENT INSTRUCTIONS
Anticoagulation Clinic:  Please call [phone] with any problems or questions regarding your Warfarin therapy.

## 2019-08-20 NOTE — PROGRESS NOTES
ANTICOAGULATION FOLLOW-UP CLINIC VISIT    Patient Name:  Erich Craven  Date:  2019  Contact Type:  Face to Face    SUBJECTIVE:  Patient Findings         Clinical Outcomes     Negatives:   Major bleeding event, Thromboembolic event, Anticoagulation-related hospital admission, Anticoagulation-related ED visit, Anticoagulation-related fatality           OBJECTIVE    INR Protime   Date Value Ref Range Status   2019 2.2 (A) 0.86 - 1.14 Final       ASSESSMENT / PLAN  No question data found.  Anticoagulation Summary  As of 2019    INR goal:   2.0-3.0   TTR:   82.1 % (4.3 mo)   INR used for dosin.2 (2019)   Warfarin maintenance plan:   7.5 mg (5 mg x 1.5) every Mon, Wed, Fri; 5 mg (5 mg x 1) all other days   Full warfarin instructions:   7.5 mg every Mon, Wed, Fri; 5 mg all other days   Weekly warfarin total:   42.5 mg   No change documented:   Emelyn Dugan RN   Plan last modified:   Lanny Spivey RN (2019)   Next INR check:   2019   Target end date:            Anticoagulation Episode Summary     INR check location:       Preferred lab:       Send INR reminders to:   SERENITY BECERRA    Comments:         Anticoagulation Care Providers     Provider Role Specialty Phone number    Dontrell Gómez MD Referring Internal Medicine [phone]            See the Encounter Report to view Anticoagulation Flowsheet and Dosing Calendar (Go to Encounters tab in chart review, and find the Anticoagulation Therapy Visit)        Emelyn Dugan RN

## 2019-08-21 LAB
ANION GAP SERPL CALCULATED.3IONS-SCNC: 6 MMOL/L (ref 3–14)
BUN SERPL-MCNC: 19 MG/DL (ref 7–30)
CALCIUM SERPL-MCNC: 8.4 MG/DL (ref 8.5–10.1)
CHLORIDE SERPL-SCNC: 107 MMOL/L (ref 94–109)
CO2 SERPL-SCNC: 29 MMOL/L (ref 20–32)
CREAT SERPL-MCNC: 0.78 MG/DL (ref 0.66–1.25)
GFR SERPL CREATININE-BSD FRML MDRD: 85 ML/MIN/{1.73_M2}
GLUCOSE SERPL-MCNC: 88 MG/DL (ref 70–99)
POTASSIUM SERPL-SCNC: 4.4 MMOL/L (ref 3.4–5.3)
SODIUM SERPL-SCNC: 142 MMOL/L (ref 133–144)

## 2019-09-04 DIAGNOSIS — F43.21 ADJUSTMENT DISORDER WITH DEPRESSED MOOD: ICD-10-CM

## 2019-09-05 RX ORDER — CLONAZEPAM 0.5 MG/1
TABLET ORAL
Qty: 60 TABLET | Refills: 0 | Status: SHIPPED | OUTPATIENT
Start: 2019-09-05 | End: 2019-10-03

## 2019-09-05 NOTE — TELEPHONE ENCOUNTER
clonazePAM (KLONOPIN) 0.5 MG tablet  Last Written Prescription Date:  8/6/2019  Last Fill Quantity: 60,  # refills: 0   Last office visit: 8/20/2019 with prescribing provider:  8/20/2019   Future Office Visit:   Next 5 appointments (look out 90 days)    Sep 11, 2019  9:00 AM CDT  Office Visit with Dontrell G[facility]z MD  [facility] [address] [place] [id number] [phone]   Oct 10, 2019  2:45 PM CDT  Return Visit with Brent Portillo MD  [facility] ([facility]) [address] [id number] [phone] OPT 2         Requested Prescriptions   Pending Prescriptions Disp Refills     clonazePAM (KLONOPIN) 0.5 MG tablet [Pharmacy Med Name: CLONAZEPAM 0.5 MG TABLET] 60 tablet 0     Sig: TAKE 1 TABLET BY MOUTH TWICE DAILY AS NEEDED       There is no refill protocol information for this order      Future refills by PCP Dr. Dontrell Gómez with phone number [phone].

## 2019-09-05 NOTE — TELEPHONE ENCOUNTER
checked today 9/5/19     Routing refill request to provider for review/approval because:  Drug not on the [facility] refill protocol     Keisha MARTIN RN

## 2019-09-11 ENCOUNTER — OFFICE VISIT (OUTPATIENT)
Dept: FAMILY MEDICINE | Facility: CLINIC | Age: 79
End: 2019-09-11
Payer: MEDICARE

## 2019-09-11 VITALS
HEART RATE: 70 BPM | SYSTOLIC BLOOD PRESSURE: 133 MMHG | DIASTOLIC BLOOD PRESSURE: 67 MMHG | TEMPERATURE: 97.4 F | WEIGHT: 302 LBS | BODY MASS INDEX: 40.96 KG/M2 | OXYGEN SATURATION: 96 %

## 2019-09-11 DIAGNOSIS — M19.011 PRIMARY OSTEOARTHRITIS OF RIGHT SHOULDER: ICD-10-CM

## 2019-09-11 DIAGNOSIS — E66.01 MORBID OBESITY (H): ICD-10-CM

## 2019-09-11 DIAGNOSIS — I82.4Y9 DEEP VEIN THROMBOSIS (DVT) OF PROXIMAL LOWER EXTREMITY, UNSPECIFIED CHRONICITY, UNSPECIFIED LATERALITY (H): ICD-10-CM

## 2019-09-11 DIAGNOSIS — J44.9 CHRONIC OBSTRUCTIVE PULMONARY DISEASE, UNSPECIFIED COPD TYPE (H): ICD-10-CM

## 2019-09-11 DIAGNOSIS — F43.21 ADJUSTMENT DISORDER WITH DEPRESSED MOOD: ICD-10-CM

## 2019-09-11 DIAGNOSIS — I48.20 CHRONIC ATRIAL FIBRILLATION (H): Primary | ICD-10-CM

## 2019-09-11 DIAGNOSIS — E03.9 ACQUIRED HYPOTHYROIDISM: ICD-10-CM

## 2019-09-11 DIAGNOSIS — I10 ESSENTIAL HYPERTENSION: ICD-10-CM

## 2019-09-11 DIAGNOSIS — I50.32 CHRONIC DIASTOLIC HEART FAILURE (H): ICD-10-CM

## 2019-09-11 DIAGNOSIS — G47.33 OSA (OBSTRUCTIVE SLEEP APNEA): ICD-10-CM

## 2019-09-11 LAB
ANION GAP SERPL CALCULATED.3IONS-SCNC: 4 MMOL/L (ref 3–14)
BUN SERPL-MCNC: 18 MG/DL (ref 7–30)
CALCIUM SERPL-MCNC: 8.6 MG/DL (ref 8.5–10.1)
CHLORIDE SERPL-SCNC: 105 MMOL/L (ref 94–109)
CO2 SERPL-SCNC: 29 MMOL/L (ref 20–32)
CREAT SERPL-MCNC: 0.79 MG/DL (ref 0.66–1.25)
GFR SERPL CREATININE-BSD FRML MDRD: 85 ML/MIN/{1.73_M2}
GLUCOSE SERPL-MCNC: 114 MG/DL (ref 70–99)
NT-PROBNP SERPL-MCNC: 48 PG/ML (ref 0–450)
POTASSIUM SERPL-SCNC: 4.1 MMOL/L (ref 3.4–5.3)
SODIUM SERPL-SCNC: 138 MMOL/L (ref 133–144)

## 2019-09-11 PROCEDURE — 36415 COLL VENOUS BLD VENIPUNCTURE: CPT | Performed by: INTERNAL MEDICINE

## 2019-09-11 PROCEDURE — 99214 OFFICE O/P EST MOD 30 MIN: CPT | Performed by: INTERNAL MEDICINE

## 2019-09-11 PROCEDURE — 80048 BASIC METABOLIC PNL TOTAL CA: CPT | Performed by: INTERNAL MEDICINE

## 2019-09-11 PROCEDURE — 83880 ASSAY OF NATRIURETIC PEPTIDE: CPT | Performed by: INTERNAL MEDICINE

## 2019-09-11 NOTE — PROGRESS NOTES
Subjective     Erich Craven is a 79 year old male who presents to clinic today for the following health issues:    HPI   Medication Followup of all meds    Taking Medication as prescribed: yes    Side Effects:  None    Medication Helping Symptoms:  yes     Pt complains of weight gain. States that he would like to have a service dog for exercise and companionship.     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 He will be receiving topical cream for treatment.     Reports that he has been keeping up on his knee exercises to help relieve his pain. Notes that the swelling in his legs and ankles has remained the same. Denies significant changes in his diet. He estimates to be drinking ~40 oz every day.     Patient Active Problem List   Diagnosis     Morbid obesity (H)     PE (pulmonary thromboembolism) (H)     HTN (hypertension)     Chronic diastolic heart failure (H)     Dyspnea     Fatigue     RIN (obstructive sleep apnea)     COPD (chronic obstructive pulmonary disease) (H)     Hypothyroidism     Paroxysmal A-fib (H)     HLD (hyperlipidemia)     Lymphedema     (HFpEF) heart failure with preserved ejection fraction (H)     Essential hypertension     Cardiac pacemaker in situ     DVT (deep venous thrombosis) (H)     CAD (coronary artery disease)     Obesity, Class III, BMI 40-49.9 (morbid obesity) (H)     Muscle tension headache     Left knee pain     Past Surgical History:   Procedure Laterality Date     cardiac stenting       ENT SURGERY      tonsillectomy     IMPLANT PACEMAKER       ORTHOPEDIC SURGERY Right 2012    knee replacement       Social History     Tobacco Use     Smoking status: Former Smoker     Packs/day: 0.50     Years: 25.00     Pack years: 12.50     Smokeless tobacco: Never Used     Tobacco comment: quit in 1989   Substance Use Topics     Alcohol use: No     Frequency: Never     Comment: quit in 1989     Family History   Problem Relation Age of Onset     Hypertension  Father          Current Outpatient Medications   Medication Sig Dispense Refill     Acetylcarnitine HCl (ACETYL L-CARNITINE) 500 MG CAPS Take 1 capsule by mouth 2 times daily       albuterol (PROAIR HFA/PROVENTIL HFA/VENTOLIN HFA) 108 (90 Base) MCG/ACT inhaler Inhale 2 puffs into the lungs every 4 hours as needed       aspirin (ASA) 81 MG chewable tablet Take 1 tablet by mouth daily       atorvastatin (LIPITOR) 40 MG tablet Take 1 tablet (40 mg) by mouth every evening 90 tablet 3     CALCIUM PO Take 600 mg by mouth daily       carvedilol (COREG) 12.5 MG tablet Take 12.5 mg by mouth 2 times daily       Cholecalciferol (VITAMIN D3) 1000 units CAPS Take 1,000 Units by mouth daily       clonazePAM (KLONOPIN) 0.5 MG tablet TAKE 1 TABLET BY MOUTH TWICE DAILY AS NEEDED 60 tablet 0     co-enzyme Q-10 100 MG CAPS capsule Take 100 mg by mouth daily       diltiazem ER COATED BEADS (CARDIZEM CD/CARTIA XT) 300 MG 24 hr capsule Take 1 capsule (300 mg) by mouth daily 90 capsule 0     furosemide (LASIX) 20 MG tablet TAKE 3 TABLETS BY MOUTH TWICE DAILY. 540 tablet 0     isosorbide mononitrate (IMDUR) 30 MG 24 hr tablet Take 1 tablet (30 mg) by mouth daily 90 tablet 3     levothyroxine (SYNTHROID/LEVOTHROID) 175 MCG tablet Take 1 tablet by mouth daily Additional 1/2 tablet on Sundays       lisinopril (PRINIVIL/ZESTRIL) 40 MG tablet Take 40 mg by mouth daily       metolazone (ZAROXOLYN) 2.5 MG tablet Take 1 tablet (2.5 mg) by mouth once a week On Thursday 30 tablet 11     Multiple vitamin TABS Take 1 tablet by mouth daily       potassium chloride ER (K-TAB/KLOR-CON) 10 MEQ CR tablet Take 10 mEq by mouth 2 times daily       tamsulosin (FLOMAX) 0.4 MG capsule Take 1 capsule (0.4 mg) by mouth daily 90 capsule 3     Taurine 500 MG CAPS Take 500 mg by mouth 2 times daily       venlafaxine (EFFEXOR) 37.5 MG tablet Take 37.5 mg by mouth 2 times daily       warfarin (COUMADIN) 5 MG tablet TAKE 1-1.5 TABLETS (5-7.5 MG) BY MOUTH DAILY AS  DIRECTED BY INR CLINIC 135 tablet 0     Allergies   Allergen Reactions     No Known Allergies        Reviewed and updated as needed this visit by Provider         Review of Systems   ROS COMP: Constitutional, HEENT, cardiovascular, pulmonary, gi and gu systems are negative, except as otherwise noted.    This document serves as a record of the services and decisions personally performed and made by Dontrell Gómez MD. It was created on his behalf by Patric Montero, a trained medical scribe. The creation of this document is based on the provider's statements to the medical scribe.  Patric Montero September 11, 2019 9:14 AM          Objective    /67 (BP Location: Left arm, Cuff Size: Adult Large)   Pulse 70   Temp 97.4  F (36.3  C) (Oral)   Wt 137 kg (302 lb)   SpO2 96%   BMI 40.96 kg/m    Body mass index is 40.96 kg/m .     Physical Exam   HENT large eschar behind his right ear  Neck was supple without adenopathy or thyromegaly his carotids were normal without bruits  Chest clear to auscultation and percussion  Cardiovascular S1 and S2 are physiologic without murmurs or gallops  Abdomen bowel sounds were normal.  There is no palpable mass or organomegaly  Extremities nontender with 2-3+ pretibial edema with chronic venous stasis changes  Pulses pedal pulses are as described otherwise his pulses are bilaterally symmetrical throughout without bruits  Skin without significant abnormality  Neck veins were not evaluative    Diagnostic Test Results:  Labs reviewed in Epic  No results found for this or any previous visit (from the past 24 hour(s)).        Assessment & Plan     Rechecking blood work today in lab and will notify the pt of the results once they are made available.    Chronic atrial fibrillation (H)  Continue to exercise and lose weight. On anticoagulation     Chronic diastolic heart failure (H)  Monitor fluid intake and lose weight with increasing exercise   - Basic metabolic panel  - BNP-N terminal  pro    Chronic obstructive pulmonary disease, unspecified COPD type (H)  Stable     Morbid obesity (H)  Increase the amount of exercise weekly. Monitor diet and fluid intake. We will be witting a letter for the pt's landlord to allow for a service dog.     RIN (obstructive sleep apnea)  Stable with current therapies     Deep vein thrombosis (DVT) of proximal lower extremity, unspecified chronicity, unspecified laterality (H)  Stable, continuing with current therapies     Primary osteoarthritis of right shoulder  Stable, pt should increase exercise routine.     Acquired hypothyroidism      Essential hypertension  Well controlled with current therapies.    Adjustment disorder with depressed mood        BMI:   Estimated body mass index is 40.96 kg/m  as calculated from the following:    Height as of 8/20/19: 1.829 m (6').    Weight as of this encounter: 137 kg (302 lb).   Weight management plan: Discussed healthy diet and exercise guidelines        FUTURE APPOINTMENTS:       - Follow-up visit in 2 mo.    No follow-ups on file.     The information in this document, created by the medical scribe for me, accurately reflects the services I personally performed and the decisions made by me. I have reviewed and approved this document for accuracy prior to leaving the patient care area.  September 11, 2019 9:21 AM    Dontrell Gómez MD  [facility]

## 2019-09-16 ENCOUNTER — TELEPHONE (OUTPATIENT)
Dept: SLEEP MEDICINE | Facility: CLINIC | Age: 79
End: 2019-09-16

## 2019-09-16 NOTE — TELEPHONE ENCOUNTER
Patient called on 09/15/19 to cancel his in lab study as his son is in the ICU Unit. I don't want to call this patient under the circumstances. I will wait a few days. Patient stated he will call back to reschedule.    Marcy Sun

## 2019-09-17 ENCOUNTER — TELEPHONE (OUTPATIENT)
Dept: FAMILY MEDICINE | Facility: CLINIC | Age: 79
End: 2019-09-17

## 2019-09-17 NOTE — TELEPHONE ENCOUNTER
Left message for pt to call the clinic to reschedule his [facility] clinic appointment to recheck an INR since he missed today's appointment.

## 2019-09-20 ENCOUNTER — ANTICOAGULATION THERAPY VISIT (OUTPATIENT)
Dept: NURSING | Facility: CLINIC | Age: 79
End: 2019-09-20
Payer: MEDICARE

## 2019-09-20 LAB — INR POINT OF CARE: 2.7 (ref 0.86–1.14)

## 2019-09-20 PROCEDURE — 36416 COLLJ CAPILLARY BLOOD SPEC: CPT

## 2019-09-20 PROCEDURE — 85610 PROTHROMBIN TIME: CPT | Mod: QW

## 2019-09-20 NOTE — PROGRESS NOTES
ANTICOAGULATION FOLLOW-UP CLINIC VISIT    Patient Name:  Erich Craven  Date:  2019  Contact Type:  Face to Face    SUBJECTIVE:  Patient Findings     Positives:   Other complaints (Pt;s Son, in the ICU this week, life has been stressful per pt.)    Comments:   The patient was assessed for diet, medication, and activity level changes, missed or extra doses, bruising or bleeding, with no problem findings.          Clinical Outcomes     Comments:   The patient was assessed for diet, medication, and activity level changes, missed or extra doses, bruising or bleeding, with no problem findings.             OBJECTIVE    INR Protime   Date Value Ref Range Status   2019 2.7 (A) 0.86 - 1.14 Final       ASSESSMENT / PLAN  INR assessment THER    Recheck INR In: 4 WEEKS    INR Location Clinic      Anticoagulation Summary  As of 2019    INR goal:   2.0-3.0   TTR:   85.6 % (5.3 mo)   INR used for dosin.7 (2019)   Warfarin maintenance plan:   7.5 mg (5 mg x 1.5) every Mon, Wed, Fri; 5 mg (5 mg x 1) all other days   Full warfarin instructions:   7.5 mg every Mon, Wed, Fri; 5 mg all other days   Weekly warfarin total:   42.5 mg   Plan last modified:   Lanny Spivey RN (2019)   Next INR check:   10/21/2019   Target end date:            Anticoagulation Episode Summary     INR check location:       Preferred lab:       Send INR reminders to:   SERENITY BECERRA    Comments:         Anticoagulation Care Providers     Provider Role Specialty Phone number    Dontrell Gómez MD Referring Internal Medicine [phone]            See the Encounter Report to view Anticoagulation Flowsheet and Dosing Calendar (Go to Encounters tab in chart review, and find the Anticoagulation Therapy Visit)    Dosage adjustment made based on physician directed care plan.    Raina Bush RN

## 2019-09-22 ENCOUNTER — THERAPY VISIT (OUTPATIENT)
Dept: SLEEP MEDICINE | Facility: CLINIC | Age: 79
End: 2019-09-22
Payer: MEDICARE

## 2019-09-22 DIAGNOSIS — G47.33 OSA (OBSTRUCTIVE SLEEP APNEA): ICD-10-CM

## 2019-09-22 DIAGNOSIS — G47.34 NOCTURNAL HYPOXEMIA: ICD-10-CM

## 2019-09-22 PROCEDURE — 95811 POLYSOM 6/>YRS CPAP 4/> PARM: CPT | Performed by: INTERNAL MEDICINE

## 2019-09-23 NOTE — PROGRESS NOTES
Completed a all night titration PSG per provider order.    Preliminary AHI >15.  A final therapeutic PAP pressure was achieved.    Supine REM was seen on therapeutic pressure.    Patient reports feeling refreshed in AM.

## 2019-09-26 LAB — SLPCOMP: NORMAL

## 2019-09-26 NOTE — PROCEDURES
SLEEP STUDY INTERPRETATION  TITRATION STUDY      Patient: KHUSHBOO NINO  YOB: 1940  Study Date: 9/22/2019  MRN: [id number]  Referring Provider: Brent Dixon MD  Ordering Provider: Bennett Goltz, PA-C    Indications for Polysomnography: The patient is a 79 y old Male who is 6' and weighs 292.0 lbs. His BMI is 40.0, Keller sleepiness scale 2.0 and neck circumference is 48.0 cm. Relevant medical history includes paroxysmal atrial fibrillation, cardiac pacemaker, HFpEF, CAD, HTN, morbid obesity, PE, COPD, hyperlipidemia, hypothyroidism and moderate obstructive and central sleep apnea. A polysomnogram with PAP titration was performed to correct for sleep apnea.    Polysomnogram Data: A full night polysomnogram recorded the standard physiologic parameters including EEG, EOG, EMG, ECG, nasal and oral airflow. Respiratory parameters of chest and abdominal movements were recorded with respiratory inductance plethysmography. Oxygen saturation was recorded by pulse oximetry. Hypopnea scoring rule used: 1B 4%.    Treatment PSG  Sleep Architecture: Mildly increased arousal frequency. Stage N3 was not obtained.   The total recording time of the polysomnogram was 477.2 minutes. The total sleep time was 293.0 minutes. Sleep latency was increased at 62.6 minutes with the use of a sleep aid (Zolpidem 5 mg). REM latency was 281.0 minutes. Arousal index was increased at 20.1 arousals per hour. Sleep efficiency was decreased at 61.4%. Wake after sleep onset was 120.5 minutes. The patient spent 8.7% of total sleep time in Stage N1, 81.9% in Stage N2, 0.0% in Stage N3, and 9.4% in REM. Time in REM supine was 27.5 minutes.    Respiration:     The patient was titrated at pressures ranging from CPAP 5 cmH2O up to 9 cmH2O. The final pressure achieved was CPAP 9 cmH2O with a residual AHI of - events per hour. Time in REM supine on final pressure was 19.5 minutes.     This titration was considered:  - Optimal (residual  AHI < 5 events per hour and including REM supine sleep at final pressure).     Snoring - was reported as mild on treatment.    Respiratory rate and pattern - was notable for normal respiratory rate and pattern.    Sustained Sleep Associated Hypoventilation - Transcutaneous carbon dioxide monitoring was not used; however significant hypoventilation was not suggested by oximetry.    Sleep Associated Hypoxemia - (Greater than 5 minutes O2 sat at or below 88%) was present. Baseline oxygen saturation was 90.1%. Lowest oxygen saturation was 69.7%. Time spent less than or equal to 88% was 20.4 minutes. Time spent less than or equal to 89% was 95.5 minutes.    Movement Activity: An increased frequency of periodic limb movements of sleep is noted. However, majority of movements were not associated with EEG arousals.     Periodic Limb Activity - There were 525 PLMs during the entire study. The PLM index was 107.5 movements per hour. The PLM Arousal Index was 9.4 per hour.    REM EMG Activity - Excessive transient/sustained muscle activity was not present.    Nocturnal Behavior - Abnormal sleep related behaviors were not noted during/arising out of NREM / REM sleep. Bruxism - None apparent.    Cardiac Summary: Sinus rhythm.   The average pulse rate was 65.3 bpm. The minimum pulse rate was 56.9 bpm while the maximum pulse rate was 86.1 bpm. Arrhythmias were not noted.      Assessment:     This is a titration PSG for patient with moderate obstructive and central sleep apnea at baseline.     Respiratory events responded to CPAP and a pressure of 9 cm H2O was optimal in supine REM. BPAP titration was briefly attempted in supine REMN due to oxygen desaturations, but respiratory events continued on BPAP and seemed to spontaneously resolve with CPAP at 9 cm H2O.     Hypoxemia was corrected at CPAP of 9 cm H2O but oxygen saturations trended in the low normal range of 90- 91% on CPAP.     There was a high degree of periodic limb  movements of sleep. However, majority of the movements were not associated with arousals.     Recommendations:    Treatment of RIN with Auto?titrating PAP therapy with a range of  7 cmH2O to 12 cmH2O. Recommend clinical follow up with sleep management team, including review of compliance measures.    Pharmacologic therapy should be used for management of restless legs syndrome only if present and clinically indicated and not based on the presence of periodic limb movements alone.    Diagnostic Codes:   Obstructive Sleep Apnea G47.33  Sleep Hypoxemia G47.36   Periodic Limb Movement Disorder G47.61  Repetitive Intrusions Into Sleep F51.8    9/22/2019 [place] Titration Sleep Study (292.0 lbs) - Treatment was titrated to a pressure of CPAP 9 with an AHI of -. Time Spent in REM supine at this pressure was 19.5.     _____________________________________   Electronically Signed By: Brian Licona MD 09/26/2019

## 2019-09-30 ENCOUNTER — APPOINTMENT (OUTPATIENT)
Dept: GENERAL RADIOLOGY | Facility: CLINIC | Age: 79
End: 2019-09-30
Attending: EMERGENCY MEDICINE
Payer: MEDICARE

## 2019-09-30 ENCOUNTER — OFFICE VISIT (OUTPATIENT)
Dept: SLEEP MEDICINE | Facility: CLINIC | Age: 79
End: 2019-09-30
Payer: MEDICARE

## 2019-09-30 ENCOUNTER — HOSPITAL ENCOUNTER (EMERGENCY)
Facility: CLINIC | Age: 79
Discharge: HOME | End: 2019-09-30
Attending: EMERGENCY MEDICINE | Admitting: EMERGENCY MEDICINE
Payer: MEDICARE

## 2019-09-30 VITALS
SYSTOLIC BLOOD PRESSURE: 151 MMHG | RESPIRATION RATE: 18 BRPM | HEART RATE: 85 BPM | OXYGEN SATURATION: 98 % | WEIGHT: 299 LBS | BODY MASS INDEX: 40.5 KG/M2 | DIASTOLIC BLOOD PRESSURE: 89 MMHG | TEMPERATURE: 97.3 F | HEIGHT: 72 IN

## 2019-09-30 VITALS
WEIGHT: 302 LBS | BODY MASS INDEX: 40.9 KG/M2 | OXYGEN SATURATION: 94 % | RESPIRATION RATE: 16 BRPM | HEART RATE: 82 BPM | DIASTOLIC BLOOD PRESSURE: 78 MMHG | HEIGHT: 72 IN | SYSTOLIC BLOOD PRESSURE: 132 MMHG

## 2019-09-30 DIAGNOSIS — J98.8 WHEEZING-ASSOCIATED RESPIRATORY INFECTION: ICD-10-CM

## 2019-09-30 DIAGNOSIS — G47.33 OSA (OBSTRUCTIVE SLEEP APNEA): Primary | ICD-10-CM

## 2019-09-30 LAB
ANION GAP SERPL CALCULATED.3IONS-SCNC: 4 MMOL/L (ref 3–14)
BASOPHILS # BLD AUTO: 0.1 10E9/L (ref 0–0.2)
BASOPHILS NFR BLD AUTO: 0.5 %
BUN SERPL-MCNC: 19 MG/DL (ref 7–30)
CALCIUM SERPL-MCNC: 8.9 MG/DL (ref 8.5–10.1)
CHLORIDE SERPL-SCNC: 102 MMOL/L (ref 94–109)
CO2 SERPL-SCNC: 29 MMOL/L (ref 20–32)
CREAT SERPL-MCNC: 0.78 MG/DL (ref 0.66–1.25)
DIFFERENTIAL METHOD BLD: ABNORMAL
EOSINOPHIL # BLD AUTO: 0.2 10E9/L (ref 0–0.7)
EOSINOPHIL NFR BLD AUTO: 1.6 %
ERYTHROCYTE [DISTWIDTH] IN BLOOD BY AUTOMATED COUNT: 15.7 % (ref 10–15)
GFR SERPL CREATININE-BSD FRML MDRD: 85 ML/MIN/{1.73_M2}
GLUCOSE SERPL-MCNC: 108 MG/DL (ref 70–99)
HCT VFR BLD AUTO: 42.3 % (ref 40–53)
HGB BLD-MCNC: 14.1 G/DL (ref 13.3–17.7)
IMM GRANULOCYTES # BLD: 0.1 10E9/L (ref 0–0.4)
IMM GRANULOCYTES NFR BLD: 0.6 %
INTERPRETATION ECG - MUSE: NORMAL
LYMPHOCYTES # BLD AUTO: 2.1 10E9/L (ref 0.8–5.3)
LYMPHOCYTES NFR BLD AUTO: 19.8 %
MCH RBC QN AUTO: 32.2 PG (ref 26.5–33)
MCHC RBC AUTO-ENTMCNC: 33.3 G/DL (ref 31.5–36.5)
MCV RBC AUTO: 97 FL (ref 78–100)
MONOCYTES # BLD AUTO: 0.9 10E9/L (ref 0–1.3)
MONOCYTES NFR BLD AUTO: 8.3 %
NEUTROPHILS # BLD AUTO: 7.3 10E9/L (ref 1.6–8.3)
NEUTROPHILS NFR BLD AUTO: 69.2 %
NRBC # BLD AUTO: 0 10*3/UL
NRBC BLD AUTO-RTO: 0 /100
PLATELET # BLD AUTO: 196 10E9/L (ref 150–450)
POTASSIUM SERPL-SCNC: 3.2 MMOL/L (ref 3.4–5.3)
RBC # BLD AUTO: 4.38 10E12/L (ref 4.4–5.9)
SODIUM SERPL-SCNC: 135 MMOL/L (ref 133–144)
WBC # BLD AUTO: 10.5 10E9/L (ref 4–11)

## 2019-09-30 PROCEDURE — 94640 AIRWAY INHALATION TREATMENT: CPT

## 2019-09-30 PROCEDURE — 85025 COMPLETE CBC W/AUTO DIFF WBC: CPT | Performed by: EMERGENCY MEDICINE

## 2019-09-30 PROCEDURE — 71046 X-RAY EXAM CHEST 2 VIEWS: CPT

## 2019-09-30 PROCEDURE — 25000131 ZZH RX MED GY IP 250 OP 636 PS 637: Mod: GY | Performed by: EMERGENCY MEDICINE

## 2019-09-30 PROCEDURE — 80048 BASIC METABOLIC PNL TOTAL CA: CPT | Performed by: EMERGENCY MEDICINE

## 2019-09-30 PROCEDURE — 93005 ELECTROCARDIOGRAM TRACING: CPT

## 2019-09-30 PROCEDURE — 25000125 ZZHC RX 250: Performed by: EMERGENCY MEDICINE

## 2019-09-30 PROCEDURE — 99214 OFFICE O/P EST MOD 30 MIN: CPT | Performed by: PHYSICIAN ASSISTANT

## 2019-09-30 PROCEDURE — 25000132 ZZH RX MED GY IP 250 OP 250 PS 637: Mod: GY | Performed by: EMERGENCY MEDICINE

## 2019-09-30 PROCEDURE — 99285 EMERGENCY DEPT VISIT HI MDM: CPT | Mod: 25

## 2019-09-30 RX ORDER — PREDNISONE 20 MG/1
40 TABLET ORAL ONCE
Status: COMPLETED | OUTPATIENT
Start: 2019-09-30 | End: 2019-09-30

## 2019-09-30 RX ORDER — ALBUTEROL SULFATE 90 UG/1
2 AEROSOL, METERED RESPIRATORY (INHALATION) EVERY 4 HOURS PRN
Qty: 1 INHALER | Refills: 0 | Status: SHIPPED | OUTPATIENT
Start: 2019-09-30 | End: 2020-02-10

## 2019-09-30 RX ORDER — POTASSIUM CHLORIDE 1500 MG/1
40 TABLET, EXTENDED RELEASE ORAL ONCE
Status: COMPLETED | OUTPATIENT
Start: 2019-09-30 | End: 2019-09-30

## 2019-09-30 RX ORDER — PREDNISONE 20 MG/1
TABLET ORAL
Qty: 10 TABLET | Refills: 0 | Status: SHIPPED | OUTPATIENT
Start: 2019-09-30 | End: 2019-10-07

## 2019-09-30 RX ORDER — IPRATROPIUM BROMIDE AND ALBUTEROL SULFATE 2.5; .5 MG/3ML; MG/3ML
3 SOLUTION RESPIRATORY (INHALATION)
Status: DISPENSED | OUTPATIENT
Start: 2019-09-30 | End: 2019-09-30

## 2019-09-30 RX ADMIN — IPRATROPIUM BROMIDE AND ALBUTEROL SULFATE 3 ML: .5; 3 SOLUTION RESPIRATORY (INHALATION) at 11:49

## 2019-09-30 RX ADMIN — PREDNISONE 40 MG: 20 TABLET ORAL at 11:48

## 2019-09-30 RX ADMIN — POTASSIUM CHLORIDE 40 MEQ: 1500 TABLET, EXTENDED RELEASE ORAL at 12:51

## 2019-09-30 ASSESSMENT — ENCOUNTER SYMPTOMS
FEVER: 0
NAUSEA: 0
SHORTNESS OF BREATH: 1
COUGH: 1
PALPITATIONS: 0

## 2019-09-30 ASSESSMENT — MIFFLIN-ST. JEOR
SCORE: 2104.26
SCORE: 2117.86

## 2019-09-30 NOTE — ED AVS SNAPSHOT
Emergency Department  [address] [place] [address]  Phone:  [phone]  Fax:  [phone]                                    Erich Craven   MRN: [id number]    Department:   Emergency Department   Date of Visit:  9/30/2019           After Visit Summary Signature Page    I have received my discharge instructions, and my questions have been answered. I have discussed any challenges I see with this plan with the nurse or doctor.    ..........................................................................................................................................  Patient/Patient Representative Signature      ..........................................................................................................................................  Patient Representative Print Name and Relationship to Patient    ..................................................               ................................................  Date                                   Time    ..........................................................................................................................................  Reviewed by Signature/Title    ...................................................              ..............................................  Date                                               Time          22EPIC Rev 08/18

## 2019-09-30 NOTE — NURSING NOTE
Chief Complaint   Patient presents with     Study Results     PSG f/u       Initial BP (!) 147/77   Pulse 82   Resp 16   Ht 1.829 m (6')   Wt 137 kg (302 lb)   SpO2 94%   BMI 40.96 kg/m   Estimated body mass index is 40.96 kg/m  as calculated from the following:    Height as of this encounter: 1.829 m (6').    Weight as of this encounter: 137 kg (302 lb).    Medication Reconciliation: complete     ESS 6  Daria Brizuela MA

## 2019-09-30 NOTE — ED TRIAGE NOTES
Had some SOB Friday h/o COPD and pnuemonia.  Was at sleep study f/u and  felt he should be check has some audible wheezing and c/o congestion

## 2019-09-30 NOTE — DISCHARGE INSTRUCTIONS
*You may resume diet and activities as tolerated.  *Take medications as prescribed.  Prednisone and albuterol as directed. Continue your current medications  *Follow-up with your primary doctor in the next 1-2 days for a recheck.  *Return if you develop difficulty in breathing, high fever, require your inhaler more frequently than every four hours, faint or feel like you will faint or become worse in any way.    Discharge Instructions  Asthma    Asthma is a condition causing narrowing and inflammation of the airways that can make it hard to breathe.  Asthma can also cause cough, wheezing, noisy breathing and tightness in the chest.  Asthma can be brought on or  triggered  by many things, including dust, mold, pollen, cigarette smoke, exercise, stress and infections (like the common cold).     Generally, every Emergency Department visit should have a follow-up clinic visit with either a primary or a specialty clinic/provider. Please follow-up as instructed by your emergency provider today.    Return to the Emergency Department if:  Your breathing gets worse.  You need to use your inhaler more often than every 4 hours, or cannot get relief from your inhaler.  You are very weak, or feel much more ill.  You develop new symptoms, such as chest pain.  You cough up blood.  You are vomiting (throwing up) enough that you cannot keep fluids or your medicine down.    What can I do to help myself?  Fill any prescriptions the provider gave you and take them right away--especially antibiotics. Be sure to finish the whole antibiotic prescription.  You may be given a prescription for an inhaler, which can help loosen tight air passages.  Use this as needed, but not more often than directed. Inhalers work much better when used with a spacer.   You may be given a prescription for a steroid to reduce inflammation. Used long-term, these can have some side effects, but for short-term use they are safe. You may notice restlessness or  increased appetite (eating more).      You may use non-prescription cough or cold medicines. Cough medicines may help, but do not make the cough go away completely.   Avoid smoke, because this can make you feel worse. If you smoke, this may be a good time to quit! Consider using nicotine lozenges, gum, or patches to reduce cravings.   If you have a fever, Tylenol  (acetaminophen), Motrin  (ibuprofen), or Advil  (ibuprofen), may help bring fever down and may help you feel more comfortable. Be sure to read and follow the package directions, and ask your provider if you have questions.  Be sure to get your flu shot each year.  For certain age groups, the pneumonia shot can help prevent pneumonia.  It is important that you follow up with your regular provider, to be sure that you are improving from this spell (an acute asthma exacerbation), and also to do what you can to keep from having trouble again. Sometimes you need long-term medicines to keep your asthma under control.   If you were given a prescription for medicine here today, be sure to read all of the information (including the package insert) that comes with your prescription.  This will include important information about the medicine, its side effects, and any warnings that you need to know about.  The pharmacist who fills the prescription can provide more information and answer questions you may have about the medicine.  If you have questions or concerns that the pharmacist cannot address, please call or return to the Emergency Department.   Remember that you can always come back to the Emergency Department if you are not able to see your regular provider in the amount of time listed above, if you get any new symptoms, or if there is anything that worries you.      Discharge Instructions  Bronchitis, Pneumonia, Bronchospasm    You were seen today for a chest infection or inflammation. If your provider decided this was due to a bacterial infection, you may  need an antibiotic. Sometimes these are caused by a virus, and then an antibiotic will not help.     Generally, every Emergency Department visit should have a follow-up clinic visit with either a primary or a specialty clinic/provider. Please follow-up as instructed by your emergency provider today.    Return to the Emergency Department if:  Your breathing gets much worse.  You are very weak, or feel much more ill.  You develop new symptoms, such as chest pain.  You cough up blood.  You are vomiting (throwing up) enough that you cannot keep fluids or your medicine down.    What can I do to help myself?  Fill any prescriptions the provider gave you and take them right away--especially antibiotics. Be sure to finish the whole antibiotic prescription.  You may be given a prescription for an inhaler, which can help loosen tight air passages.  Use this as needed, but not more often than directed. Inhalers work much better when used with a spacer.   You may be given a prescription for a steroid to reduce inflammation. Used long-term, these can have side effects, but for short-term use they are safe. You may notice restlessness or increased appetite.      You may use non-prescription cough or cold medicines. Cough medicines may help, but don t make the cough go away completely.   Avoid smoke, because this can make your symptoms worse. If you smoke, this may be a good time to quit! Consider using nicotine lozenges, gum, or patches to reduce cravings.   If you have a fever, Tylenol  (acetaminophen), Motrin  (ibuprofen), or Advil  (ibuprofen) may help bring fever down and may help you feel more comfortable. Be sure to read and follow the package directions, and ask your provider if you have questions.  Be sure to get your flu shot each year.  For certain ages, the pneumonia shot can help prevent pneumonia.  If you were given a prescription for medicine here today, be sure to read all of the information (including the package  insert) that comes with your prescription.  This will include important information about the medicine, its side effects, and any warnings that you need to know about.  The pharmacist who fills the prescription can provide more information and answer questions you may have about the medicine.  If you have questions or concerns that the pharmacist cannot address, please call or return to the Emergency Department.     Remember that you can always come back to the Emergency Department if you are not able to see your regular provider in the amount of time listed above, if you get any new symptoms, or if there is anything that worries you.

## 2019-09-30 NOTE — PATIENT INSTRUCTIONS
You will be provided with an auto-titrating CPAP with a pressure range of 9-12 cm with heated humidity to limit nasal congestion. Adjust the heat level on humidifier to find a setting that prevents dry nose but does not cause condensation in the hose or mask. Use distilled water in the humidifier.  The CPAP has a ramp function that starts the pressure lower than your prescribed pressure and gradually increases it over a number of minutes.  This may make it easier to fall asleep.    Try to use the CPAP every-night, all night (minimum of 4 hours). Many insurances require that we prove you are using the CPAP at least 4 hours on at least 70% of nights over a 30 day period. We have 90 days to meet those criteria.            Discussed weight management and the impact of weight gain on sleep apnea.  Let me know if you snore or feel the pressure is too high.    You can get new supplies (mask, hose and filter) for your CPAP every 3-6 months, covered by insurance. You do not need to get supplies that often, but they are available if you would like them.  You may exchange the mask once within the first month if you feel the initial mask does not fit well.  Contact your medical equipment provider for equipment issues.  Please let me know if you have any return of snoring, daytime sleepiness or poor sleep quality. We will want to make sure your CPAP is adequately treating your apnea.  There is a website called CPAP.com that has accessories that may make CPAP use easier. Please visit it at your convenience.  Our phone number is [phone].    Follow-up 2 month.  Bring your CPAP machine with you to the follow up appointment.    Your BMI is Body mass index is 40.96 kg/m .  Weight management is a personal decision.  If you are interested in exploring weight loss strategies, the following discussion covers the approaches that may be successful. Body mass index (BMI) is one way to tell whether you are at a healthy weight,  overweight, or obese. It measures your weight in relation to your height.  A BMI of 18.5 to 24.9 is in the healthy range. A person with a BMI of 25 to 29.9 is considered overweight, and someone with a BMI of 30 or greater is considered obese. More than two-thirds of American adults are considered overweight or obese.  Being overweight or obese increases the risk for further weight gain. Excess weight may lead to heart disease and diabetes.  Creating and following plans for healthy eating and physical activity may help you improve your health.  Weight control is part of healthy lifestyle and includes exercise, emotional health, and healthy eating habits. Careful eating habits lifelong are the mainstay of weight control. Though there are significant health benefits from weight loss, long-term weight loss with diet alone may be very difficult to achieve- studies show long-term success with dietary management in less than 10% of people. Attaining a healthy weight may be especially difficult to achieve in those with severe obesity. In some cases, medications, devices and surgical management might be considered.  What can you do?  If you are overweight or obese and are interested in methods for weight loss, you should discuss this with your provider.     Consider reducing daily calorie intake by 500 calories.     Keep a food journal.     Avoiding skipping meals, consider cutting portions instead.    Diet combined with exercise helps maintain muscle while optimizing fat loss. Strength training is particularly important for building and maintaining muscle mass. Exercise helps reduce stress, increase energy, and improves fitness. Increasing exercise without diet control, however, may not burn enough calories to loose weight.       Start walking three days a week 10-20 minutes at a time    Work towards walking thirty minutes five days a week     Eventually, increase the speed of your walking for 1-2 minutes at time    In  addition, we recommend that you review healthy lifestyles and methods for weight loss available through the National Institutes of Health patient information sites:  http://win.niddk.nih.gov/publications/index.htm    And look into health and wellness programs that may be available through your health insurance provider, employer, local community center, or annie club.    Weight management plan: Patient was referred to their PCP to discuss a diet and exercise plan.

## 2019-09-30 NOTE — PROGRESS NOTES
Sleep Study Follow-Up Visit:    Date on this visit: 9/30/2019    Erich Craven comes in today for follow-up of his titration sleep study done on 9/22/2019 at the [facility] for previously diagnosed RIN and waking up feeling exhausted, since he moved to [place] last December.  His medical history is significant for paroxysmal atrial fibrillation, cardiac pacemaker, HFpEF, CAD, HTN, morbid obesity, PE (2012), COPD, hyperlipidemia, hypothyroidism, elevated right diaphragm.     AHI was 18.9/hr (4.9% centrals with a tendency towards periodic breathing), with desaturations down to 74%. He spent 109.6 minutes below 90% SpO2 and 68.6 minutes below 89%. RDI 23.9/hr.  REM RDI 32.7/hr.  Supine RDI 71.1/hr. His non-supine RDI was 18/hr.  Periodic Limb Movement Index 84.9/hour, 17.3/hr were associated with arousals.  CO2 ranged from 42 to 45 mmHg, not suggestive of hypoventilation. His O2 baseline was 87-90% for the first half of the night. One lpm O2 was added to bring his SpO2 to 90% or better.     Titration results:  Sleep latency 62.6 minutes with Ambien.  REM achieved.   REM latency 281 minutes.  Sleep efficiency 61.4%. Total sleep time 293 minutes.    Sleep architecture:  Stage 1, 8.7% (5%), stage 2, 81.9% (45-55%), stage 3, 0% (15-20%), stage REM, 9.4% (20-25%). Periodic Limb Movement Index 107.5/hour, 9.4/hr were associated with arousals.       CPAP titration: CPAP was titrated to 9 cm with elimination of apneas, hypopneas and desaturations. Supine REM was noted at this pressure. BiPAP 9/4 was attempted briefly in REM, but the patient had significant desaturations on BiPAP. His O2 saturation was 89-91%. These findings were reviewed with the patient.  He does not notice significant restless legs or kicking in his sleep.    Past medical/surgical history, family history, social history, medications and allergies were reviewed.      Problem List:  Patient Active Problem List    Diagnosis Date Noted      Left knee pain 07/23/2019     Priority: Medium     Muscle tension headache 05/21/2019     Priority: Medium     PE (pulmonary thromboembolism) (H)      Priority: Medium     HTN (hypertension)      Priority: Medium     Chronic diastolic heart failure (H)      Priority: Medium     Dyspnea      Priority: Medium     Fatigue      Priority: Medium     RIN (obstructive sleep apnea)      Priority: Medium     COPD (chronic obstructive pulmonary disease) (H)      Priority: Medium     Hypothyroidism      Priority: Medium     Paroxysmal A-fib (H)      Priority: Medium     HLD (hyperlipidemia)      Priority: Medium     Lymphedema      Priority: Medium     (HFpEF) heart failure with preserved ejection fraction (H)      Priority: Medium     Essential hypertension      Priority: Medium     Cardiac pacemaker in situ      Priority: Medium     DVT (deep venous thrombosis) (H)      Priority: Medium     CAD (coronary artery disease)      Priority: Medium     PCI w/ SUMMER in 2014       Obesity, Class III, BMI 40-49.9 (morbid obesity) (H)      Priority: Medium     Morbid obesity (H) 04/14/2019     Priority: Medium        Impression/Plan:    (G47.33) RIN (obstructive sleep apnea)  (primary encounter diagnosis)  Comment: CPAP 9 cm seemed to address his apnea and hypoxemia in REM supine. His oxygen was sitting right at 90%, plus or minus 1%  Plan: Comprehensive DME       I am prescribing auto CPAP 9-12 cm, heated humidifier and compliance meter. The patient was informed of the mask exchange policy and the compliance goals. They were also informed that they would be followed by the sleep therapy management team during the first month of CPAP use.       He will follow up with me in about 2 month(s).     Twenty-five minutes spent with patient, all of which were spent face-to-face counseling, consulting, coordinating plan of care.      Bennett Goltz, PA-C    CC: Dontrell Gómez MD

## 2019-09-30 NOTE — ED NOTES
Rn at bedside, pt reports breathing feels improved since neb. Pt declining need for 2nd neb at this time

## 2019-09-30 NOTE — ED PROVIDER NOTES
"  History     Chief Complaint:  Shortness of Breath    The history is provided by the patient.      Erich Craven is a 79 year old male, with history pertinent for paroxysmal atrial fibrillation on Coumadin, who presents with concerns for three days of shortness of breath. He reports three days of malaise, congestion, and \"difficulty clearing his throat\" since exposure to pneumonia one week prior. Erich attest to a worsened cough in the mornings and notes increased use of albuterol nebulizer due to symptoms. He also attest to upper chest \"heaviness\" as well as intemittent lightheadedness and diaphoresis. In addition, patient highlights baseline right lower temporal pain secondary to treatment for squamous cell carcinoma and notes he is 10 days status post flu vaccination. Erich denies any fever, nausea, palpitations, or recent steriod use.     Allergies:  No Known Drug Allergies    Medications:    Baby Asprin  Lipitor  Coreg  Klonopin  Cardizem  Imdur  Synthroid  Prinivil  Zaroxolyn  Flomax  Coumadin  Effexor  Lasix    Past Medical History:    HFpEF  BPH with urinary obstruction  CAD  DVT  Hypertension  Hyperlipidemia  Hypothyroidism  Obesity  RIN  Paroxysmal atrial fibrillation  Pulmonary thromboembolism  Hypothyroidism  COPD  Lymphedema    Past Surgical History:    Cardiac stents  Tonsillectomy  Pacemaker  R knee replacement    Family History:    HTN    Social History:  Former Smoker  Alcohol Use: No  Marital Status:      Review of Systems   Constitutional: Negative for fever.   HENT: Positive for congestion.    Respiratory: Positive for cough and shortness of breath.    Cardiovascular: Positive for chest pain. Negative for palpitations.   Gastrointestinal: Negative for nausea.   All other systems reviewed and are negative.    Physical Exam   Patient Vitals for the past 24 hrs:   BP Temp Temp src Pulse Resp SpO2 Height Weight   09/30/19 1325 (!) 151/89 -- -- 85 18 98 % -- --   09/30/19 1215 (!) 143/82 " -- -- -- -- 93 % -- --   09/30/19 1145 -- -- -- -- -- 91 % -- --   09/30/19 1130 136/89 -- -- 89 -- 92 % -- --   09/30/19 1110 (!) 143/74 97.3  F (36.3  C) Temporal 96 20 95 % 1.829 m (6') 135.6 kg (299 lb)     Physical Exam  General: Well-nourished, appears to be resting comfortably when I enter the room  Eyes: PERRL, conjunctivae pink no scleral icterus or conjunctival injection  ENT:  Moist mucus membranes, posterior oropharynx clear without erythema or exudates  Respiratory:  +occasional cough. +occasional diffuse wheezes. Lungs without crackles. Good air movement. Normal work of breathing  CV: Normal rate and rhythm, no murmurs/rubs/gallops  GI:  Abdomen soft and non-distended.  Normoactive BS.  No tenderness, guarding or rebound  Skin: Warm, dry.  No rashes or petechiae  Musculoskeletal: No peripheral edema or calf tenderness  Neuro: Alert and oriented to person/place/time  Psychiatric: Normal affect    Emergency Department Course   ECG:  ECG taken at 1117, ECG read at 1119  Normal sinus rhythm  Normal ECG  Rate 90 bpm. VA interval 182 ms. QRS duration 94 ms. QT/QTc 358/437 ms. P-R-T axes 39 40 51.    Imaging:  XR Chest 2 Views  No radiographic evidence of acute chest abnormality.   Reading per radiology    Laboratory:  CBC: WBC 10.5, HGB 14.1,   BMP: K 3.2 (L) glc 108 (H) o/w WNL (Creatinine 0.78)    Interventions:  1148: Prednisone 40 mg PO  1149: Duoneb 3 mL nebulization   1251: Potassium chloride 40 mEq PO    Emergency Department Course:  Nursing notes and vitals reviewed.  1117 EKG obtained in the ED, see results above.   1134 I performed an exam of the patient as documented above.   1154 IV was inserted and blood was drawn for laboratory testing, results above.  1159 The patient was sent for a XR while in the emergency department, results above.   1315 I personally reviewed the imaging and laboratory results with the patient and answered all related questions prior to discharge, anticipatory  guidance given.    Impression & Plan      Medical Decision Making:  Erich Craven is a 79 year old male who presents to the emergency department today for evaluation of symptoms consistent with URI. The patient appears well and nontoxic. There is no clinical evidence of dehydration. He has mild wheezing that improved with a nebulizer. The patient has normal oxygen saturations with normal work of breathing.  A chest x-ray was clear. At this time I find no indication for antibiotics. I discussed symptomatic treatment including prednisone and he was given his first dose here. He is given scripts for a short course of prednisone and for albuterol.  It was discussed that cough and airway hyperreactivity may persist for several weeks. I discussed the need to return for signs of respiratory distress, significant shortness of breath, confusion, if high fevers develop or for any other questions or concerns. Primary clinic follow up in 1 week is recommended for persistent symptoms.      Diagnosis:    ICD-10-CM   1. Wheezing-associated respiratory infection J98.8     Disposition: Home    Discharge Medications:  predniSONE 20 MG tablet  Commonly known as:  DELTASONE      Take two tablets (= 40mg) each day for 5 (five) days  Quantity:  10 tablet  Refills:  0     albuterol 108 (90 Base) MCG/ACT inhaler  Commonly known as:  PROAIR HFA/PROVENTIL HFA/VENTOLIN HFA     Dose:  2 puff  Inhale 2 puffs into the lungs every 4 hours as needed for shortness of breath / dyspnea or wheezing  Quantity:  1 Inhaler  Refills:  0       Scribe Disclosure:  Vignesh HUMPHREY, am serving as a scribe at 11:40 AM on 9/30/2019 to document services personally performed by Anne Marie Mckeon MD based on my observations and the provider's statements to me.     EMERGENCY DEPARTMENT       Anne Marie Mckeon MD  09/30/19 0809

## 2019-10-03 DIAGNOSIS — F43.21 ADJUSTMENT DISORDER WITH DEPRESSED MOOD: ICD-10-CM

## 2019-10-03 DIAGNOSIS — I50.32 CHRONIC DIASTOLIC HEART FAILURE (H): ICD-10-CM

## 2019-10-03 NOTE — TELEPHONE ENCOUNTER
"Requesting a 90-day Supply.    Requested Prescriptions   Pending Prescriptions Disp Refills     metolazone (ZAROXOLYN) 2.5 MG tablet 30 tablet 11     Sig: Take 1 tablet (2.5 mg) by mouth once a week On Thursday       Diuretics (Including Combos) Protocol Failed - 10/3/2019  5:46 PM        Failed - Blood pressure under 140/90 in past 12 months     BP Readings from Last 3 Encounters:   09/30/19 (!) 151/89   09/30/19 132/78   09/11/19 133/67                 Failed - Normal serum potassium on file in past 12 months     Recent Labs   Lab Test 09/30/19  1154   POTASSIUM 3.2*                    Passed - Recent (12 mo) or future (30 days) visit within the authorizing provider's specialty     Patient has had an office visit with the authorizing provider or a provider within the authorizing providers department within the previous 12 mos or has a future within next 30 days. See \"Patient Info\" tab in inbasket, or \"Choose Columns\" in Meds & Orders section of the refill encounter.              Passed - Medication is active on med list        Passed - Patient is age 18 or older        Passed - Normal serum creatinine on file in past 12 months     Recent Labs   Lab Test 09/30/19  1154   CR 0.78              Passed - Normal serum sodium on file in past 12 months     Recent Labs   Lab Test 09/30/19  1154                     "

## 2019-10-04 RX ORDER — METOLAZONE 2.5 MG/1
2.5 TABLET ORAL WEEKLY
Qty: 30 TABLET | Refills: 11 | OUTPATIENT
Start: 2019-10-04

## 2019-10-04 NOTE — TELEPHONE ENCOUNTER
clonazePAM (KLONOPIN) 0.5 MG tablet 60 tablet 0 9/5/2019           Last Written Prescription Date:  09/05/2019  Last Fill Quantity: 60,   # refills: 0  Last Office Visit: 09/11/2019  Future Office visit:    Next 5 appointments (look out 90 days)    Oct 07, 2019  3:00 PM CDT  [facility] [address] [address]  [phone]   Oct 10, 2019  2:45 PM CDT  Return Visit with Brent Portillo MD  [facility] ([facility]) [address]  [place] [address]  [phone] OPT 2   Dec 11, 2019  9:00 AM CST  Office Visit with Dontrell [facility]  [facility] [address] [address]  [phone]           Routing refill request to provider for review/approval because:  Drug not on the [facility], [facility] or [facility] refill protocol or controlled substance

## 2019-10-04 NOTE — TELEPHONE ENCOUNTER
RX [place] reviewed:  not reviewed/not due - RN unable to view  under this provider.    [place]  [email or web address]    Routing refill request to provider for review/approval because:  Drug not on the [facility] refill protocol     CHARBEL Contreras, RN  [employer] [facility]

## 2019-10-07 ENCOUNTER — ANTICOAGULATION THERAPY VISIT (OUTPATIENT)
Dept: NURSING | Facility: CLINIC | Age: 79
End: 2019-10-07
Payer: MEDICARE

## 2019-10-07 ENCOUNTER — OFFICE VISIT (OUTPATIENT)
Dept: FAMILY MEDICINE | Facility: CLINIC | Age: 79
End: 2019-10-07
Payer: MEDICARE

## 2019-10-07 VITALS
WEIGHT: 300 LBS | OXYGEN SATURATION: 96 % | SYSTOLIC BLOOD PRESSURE: 137 MMHG | HEIGHT: 72 IN | HEART RATE: 90 BPM | DIASTOLIC BLOOD PRESSURE: 76 MMHG | TEMPERATURE: 97.3 F | BODY MASS INDEX: 40.63 KG/M2

## 2019-10-07 DIAGNOSIS — J06.9 VIRAL UPPER RESPIRATORY TRACT INFECTION: ICD-10-CM

## 2019-10-07 DIAGNOSIS — G47.33 OSA (OBSTRUCTIVE SLEEP APNEA): ICD-10-CM

## 2019-10-07 DIAGNOSIS — I10 ESSENTIAL HYPERTENSION: ICD-10-CM

## 2019-10-07 DIAGNOSIS — F43.21 ADJUSTMENT DISORDER WITH DEPRESSED MOOD: ICD-10-CM

## 2019-10-07 DIAGNOSIS — J44.9 CHRONIC OBSTRUCTIVE PULMONARY DISEASE, UNSPECIFIED COPD TYPE (H): Primary | ICD-10-CM

## 2019-10-07 DIAGNOSIS — I48.91 ATRIAL FIBRILLATION (H): ICD-10-CM

## 2019-10-07 DIAGNOSIS — I50.32 CHRONIC DIASTOLIC HEART FAILURE (H): ICD-10-CM

## 2019-10-07 DIAGNOSIS — I82.4Y9 DEEP VEIN THROMBOSIS (DVT) OF PROXIMAL LOWER EXTREMITY, UNSPECIFIED CHRONICITY, UNSPECIFIED LATERALITY (H): ICD-10-CM

## 2019-10-07 DIAGNOSIS — E03.9 ACQUIRED HYPOTHYROIDISM: ICD-10-CM

## 2019-10-07 DIAGNOSIS — E66.01 MORBID OBESITY (H): ICD-10-CM

## 2019-10-07 DIAGNOSIS — M17.12 PRIMARY OSTEOARTHRITIS OF LEFT KNEE: ICD-10-CM

## 2019-10-07 DIAGNOSIS — I48.0 PAROXYSMAL ATRIAL FIBRILLATION (H): ICD-10-CM

## 2019-10-07 LAB — INR POINT OF CARE: 2.6 (ref 0.86–1.14)

## 2019-10-07 PROCEDURE — 85610 PROTHROMBIN TIME: CPT | Mod: QW

## 2019-10-07 PROCEDURE — 36416 COLLJ CAPILLARY BLOOD SPEC: CPT

## 2019-10-07 PROCEDURE — 99214 OFFICE O/P EST MOD 30 MIN: CPT | Performed by: INTERNAL MEDICINE

## 2019-10-07 PROCEDURE — 99207 ZZC NO CHARGE NURSE ONLY: CPT

## 2019-10-07 RX ORDER — METOLAZONE 2.5 MG/1
2.5 TABLET ORAL
Qty: 30 TABLET | Refills: 3 | Status: SHIPPED | OUTPATIENT
Start: 2019-10-07 | End: 2020-01-02

## 2019-10-07 RX ORDER — CLONAZEPAM 0.5 MG/1
TABLET ORAL
Qty: 60 TABLET | Refills: 0 | Status: SHIPPED | OUTPATIENT
Start: 2019-10-07 | End: 2019-11-03

## 2019-10-07 ASSESSMENT — MIFFLIN-ST. JEOR: SCORE: 2108.79

## 2019-10-07 NOTE — PROGRESS NOTES
Subjective     Ercih Craven is a 79 year old male who presents to clinic today for the following health issues:    HPI   ED/UC Followup:    Facility:  [facility]  Date of visit: 9/30/19  Reason for visit: wheezing, uri  Current Status: better     The pt presents to the clinic for a UC f/u. The pt was in UC on 09/30/2019 due to SOB, malaise and congestion which had been occurring for three days. The hospital note states he was exposed to PNA a week prior to being admitted into UC.     Today the pt reports that his breathing is much improved. He grades his breathing as 7/10 today, but mentions that he still has congestion. He denies having a productive cough since he finished his 5-day course of Prednisone. He also notes that he has been using a CPAP since last Thursday. He has hasn't been using his albuterol inhaler as frequently since starting to feel better.     The pt notes that he is taking Lasix every day. He is currently wearing compression socks for his ANNIKA.     He also inquires about a note for a  dog.     Patient Active Problem List   Diagnosis     Morbid obesity (H)     PE (pulmonary thromboembolism) (H)     HTN (hypertension)     Chronic diastolic heart failure (H)     Dyspnea     Fatigue     RIN (obstructive sleep apnea)     COPD (chronic obstructive pulmonary disease) (H)     Hypothyroidism     Paroxysmal A-fib (H)     HLD (hyperlipidemia)     Lymphedema     (HFpEF) heart failure with preserved ejection fraction (H)     Essential hypertension     Cardiac pacemaker in situ     DVT (deep venous thrombosis) (H)     CAD (coronary artery disease)     Obesity, Class III, BMI 40-49.9 (morbid obesity) (H)     Muscle tension headache     Left knee pain     Past Surgical History:   Procedure Laterality Date     cardiac stenting       ENT SURGERY      tonsillectomy     IMPLANT PACEMAKER       ORTHOPEDIC SURGERY Right 2012    knee replacement       Social History     Tobacco Use     Smoking status: Former Smoker      Packs/day: 0.50     Years: 25.00     Pack years: 12.50     Smokeless tobacco: Never Used     Tobacco comment: quit in 1989   Substance Use Topics     Alcohol use: No     Frequency: Never     Comment: quit in 1989     Family History   Problem Relation Age of Onset     Hypertension Father          Current Outpatient Medications   Medication Sig Dispense Refill     Acetylcarnitine HCl (ACETYL L-CARNITINE) 500 MG CAPS Take 1 capsule by mouth 2 times daily       albuterol (PROAIR HFA/PROVENTIL HFA/VENTOLIN HFA) 108 (90 Base) MCG/ACT inhaler Inhale 2 puffs into the lungs every 4 hours as needed for shortness of breath / dyspnea or wheezing 1 Inhaler 0     aspirin (ASA) 81 MG chewable tablet Take 1 tablet by mouth daily       atorvastatin (LIPITOR) 40 MG tablet Take 1 tablet (40 mg) by mouth every evening 90 tablet 3     carvedilol (COREG) 12.5 MG tablet Take 12.5 mg by mouth 2 times daily       clonazePAM (KLONOPIN) 0.5 MG tablet TAKE 1 TABLET BY MOUTH TWICE A DAY AS NEEDED 60 tablet 0     co-enzyme Q-10 100 MG CAPS capsule Take 100 mg by mouth daily       diltiazem ER COATED BEADS (CARDIZEM CD/CARTIA XT) 300 MG 24 hr capsule Take 1 capsule (300 mg) by mouth daily 90 capsule 0     furosemide (LASIX) 20 MG tablet TAKE 3 TABLETS BY MOUTH TWICE DAILY. 540 tablet 0     isosorbide mononitrate (IMDUR) 30 MG 24 hr tablet Take 1 tablet (30 mg) by mouth daily 90 tablet 3     levothyroxine (SYNTHROID/LEVOTHROID) 175 MCG tablet Take 1 tablet by mouth daily Additional 1/2 tablet on Sundays       lisinopril (PRINIVIL/ZESTRIL) 40 MG tablet Take 40 mg by mouth daily       metolazone (ZAROXOLYN) 2.5 MG tablet Take 1 tablet (2.5 mg) by mouth in the morning, Mon and Thur On Thursday 30 tablet 3     Multiple vitamin TABS Take 1 tablet by mouth daily       potassium chloride ER (K-TAB/KLOR-CON) 10 MEQ CR tablet Take 10 mEq by mouth 2 times daily       tamsulosin (FLOMAX) 0.4 MG capsule Take 1 capsule (0.4 mg) by mouth daily 90 capsule 3      Taurine 500 MG CAPS Take 500 mg by mouth 2 times daily       venlafaxine (EFFEXOR) 37.5 MG tablet Take 37.5 mg by mouth 2 times daily       warfarin (COUMADIN) 5 MG tablet TAKE 1-1.5 TABLETS (5-7.5 MG) BY MOUTH DAILY AS DIRECTED BY INR CLINIC 135 tablet 0     Allergies   Allergen Reactions     No Known Allergies        Reviewed and updated as needed this visit by Provider         Review of Systems   ROS COMP: Constitutional, HEENT, cardiovascular, pulmonary, gi and gu systems are negative, except as otherwise noted.    This document serves as a record of the services and decisions personally performed and made by Dontrell Gómez MD. It was created on his behalf by Patric Montero, a trained medical scribe. The creation of this document is based on the provider's statements to the medical scribe.  Patric Montero October 7, 2019 3:03 PM          Objective    /76 (BP Location: Left arm, Patient Position: Chair, Cuff Size: Adult Large)   Pulse 90   Temp 97.3  F (36.3  C) (Oral)   Ht 1.829 m (6')   Wt 136.1 kg (300 lb)   SpO2 96%   BMI 40.69 kg/m    Body mass index is 40.69 kg/m .     Physical Exam   General morbidly obese white male in no acute distress   Neck was supple without adenopathy or thyromegaly his carotids were normal without bruits  Chest clear to auscultation and percussion  Cardiovascular S1 and S2 are physiologic without murmurs or gallops  Abdomen obese, bowel sounds were normal.  There is no palpable mass or organomegaly  Extremities nontender with 3+ pretibial edema  Pulses pedal pulses are as described otherwise his pulses are bilaterally symmetrical throughout without bruits  Skin without significant abnormality, accept for where the dermatologist treated his skin with liquid nitrogen   Neck veins were not evaluative     Diagnostic Test Results:  Labs reviewed in Epic  none         Assessment & Plan     Chronic obstructive pulmonary disease, unspecified COPD type (H)      Viral upper  respiratory tract infection  Much improved since finishing his 5-day course Prednisone Rx.     Chronic diastolic heart failure (H)  Recommended that the pt start taking Zaroxolyn tomorrow along with his Lasix Rx.   - metolazone (ZAROXOLYN) 2.5 MG tablet  Dispense: 30 tablet; Refill: 3    Deep vein thrombosis (DVT) of proximal lower extremity, unspecified chronicity, unspecified laterality (H)      Morbid obesity (H)  Recommended the pt continue to stay active and manage a healthy well-balanced diet     Paroxysmal atrial fibrillation (H)      Essential hypertension  Well-controlled with current therapies     Primary osteoarthritis of left knee      Adjustment disorder with depressed mood      RIN (obstructive sleep apnea)      Acquired hypothyroidism           BMI:   Estimated body mass index is 40.69 kg/m  as calculated from the following:    Height as of this encounter: 1.829 m (6').    Weight as of this encounter: 136.1 kg (300 lb).   Weight management plan: Discussed healthy diet and exercise guidelines        FUTURE APPOINTMENTS:       - Follow-up visit in 1 mo    No follow-ups on file.    The information in this document, created by the medical scribe for me, accurately reflects the services I personally performed and the decisions made by me. I have reviewed and approved this document for accuracy prior to leaving the patient care area.  October 7, 2019 3:30 PM    Dontrell Gómez MD  [facility]

## 2019-10-07 NOTE — PROGRESS NOTES
ANTICOAGULATION FOLLOW-UP CLINIC VISIT    Patient Name:  Erich Craven  Date:  10/7/2019  Contact Type:  Face to Face    SUBJECTIVE:  Patient Findings     Positives:   Emergency department visit    Comments:   Pt was recently in the ER on 19 and treated for a viral URI with prednisone. Pt was started on prednisone which was discontinued on Saturday 10/5/19. Pt seeing  today for follow up.        Clinical Outcomes     Comments:   Pt was recently in the ER on 19 and treated for a viral URI with prednisone. Pt was started on prednisone which was discontinued on Saturday 10/5/19. Pt seeing  today for follow up.           OBJECTIVE    INR Protime   Date Value Ref Range Status   10/07/2019 2.6 (A) 0.86 - 1.14 Final       ASSESSMENT / PLAN  INR assessment THER    Recheck INR In: 4 WEEKS    INR Location Clinic      Anticoagulation Summary  As of 10/7/2019    INR goal:   2.0-3.0   TTR:   87.0 % (5.9 mo)   INR used for dosin.6 (10/7/2019)   Warfarin maintenance plan:   7.5 mg (5 mg x 1.5) every Mon, Wed, Fri; 5 mg (5 mg x 1) all other days   Full warfarin instructions:   7.5 mg every Mon, Wed, Fri; 5 mg all other days   Weekly warfarin total:   42.5 mg   No change documented:   Delia Jang RN   Plan last modified:   Lanny Spivey RN (2019)   Next INR check:   2019   Target end date:            Anticoagulation Episode Summary     INR check location:       Preferred lab:       Send INR reminders to:   SERENITY BECERRA    Comments:         Anticoagulation Care Providers     Provider Role Specialty Phone number    Dontrell Gómez MD Referring Internal Medicine [phone]            See the Encounter Report to view Anticoagulation Flowsheet and Dosing Calendar (Go to Encounters tab in chart review, and find the Anticoagulation Therapy Visit)    Pt INR is 2.6 today. Pt advised to continue taking 7.5 mg on , ,  and 5 mg all the other days. Recheck INR in 4  weeks or sooner as needed. Pt reports that he is feeling much better. Jaun aware if signs of clotting (pain, tenderness, swelling, color change in leg or arm, SOB) and bleeding occur (blood in stool, urine, large bruising, bleeding gums, nosebleeds) to have INR check sooner. If sx severe report to ER or concerned for stroke call 911. If general questions or concerns arise, call clinic.         Delia Jang RN

## 2019-10-08 NOTE — TELEPHONE ENCOUNTER
"warfarin ANTICOAGULANT (COUMADIN) 5 MG tablet    Last Written Prescription Date:  07/05/2019  Last Fill Quantity: 135,  # refills: 0   Last office visit: No previous visit found with prescribing provider:     Future Office Visit:   Next 5 appointments (look out 90 days)    Oct 10, 2019  2:45 PM CDT  Return Visit with Brent Portillo MD  [facility] ([facility]) [address]  [place] [id number] [phone] OPT 2   Dec 11, 2019  9:00 AM CST  Office Visit with Dontrell [facility]z [facility] [address] [id number] [phone]           Requested Prescriptions   Pending Prescriptions Disp Refills     warfarin ANTICOAGULANT (COUMADIN) 5 MG tablet [Pharmacy Med Name: WARFARIN SODIUM 5 MG TABLET] 135 tablet 0     Sig: TAKE 1-1.5 TABLETS (5-7.5 MG) BY MOUTH DAILY AS DIRECTED BY INR CLINIC       Vitamin K Antagonists Failed - 10/7/2019  1:43 PM        Failed - INR is within goal in the past 6 weeks     Confirm INR is within goal in the past 6 weeks.     Recent Labs   Lab Test 10/07/19   INR 2.6*                       Failed - Medication is active on med list        Passed - Recent (12 mo) or future (30 days) visit within the authorizing provider's specialty     Patient has had an office visit with the authorizing provider or a provider within the authorizing providers department within the previous 12 mos or has a future within next 30 days. See \"Patient Info\" tab in inbasket, or \"Choose Columns\" in Meds & Orders section of the refill encounter.              Passed - Patient is 18 years of age or older          "

## 2019-10-09 RX ORDER — WARFARIN SODIUM 5 MG/1
5-7.5 TABLET ORAL DAILY
Qty: 135 TABLET | Refills: 0 | Status: SHIPPED | OUTPATIENT
Start: 2019-10-09 | End: 2020-01-15

## 2019-10-09 NOTE — TELEPHONE ENCOUNTER
Prescription approved per [facility] Refill Protocol.    Jonathan SAMANO RN

## 2019-10-10 ENCOUNTER — OFFICE VISIT (OUTPATIENT)
Dept: CARDIOLOGY | Facility: CLINIC | Age: 79
End: 2019-10-10
Attending: INTERNAL MEDICINE
Payer: MEDICARE

## 2019-10-10 VITALS
BODY MASS INDEX: 40.42 KG/M2 | DIASTOLIC BLOOD PRESSURE: 71 MMHG | HEART RATE: 79 BPM | HEIGHT: 72 IN | WEIGHT: 298.4 LBS | SYSTOLIC BLOOD PRESSURE: 127 MMHG

## 2019-10-10 DIAGNOSIS — I50.30 HEART FAILURE WITH PRESERVED EJECTION FRACTION, UNSPECIFIED HF CHRONICITY (H): ICD-10-CM

## 2019-10-10 DIAGNOSIS — G47.33 OSA (OBSTRUCTIVE SLEEP APNEA): ICD-10-CM

## 2019-10-10 DIAGNOSIS — I25.10 CORONARY ARTERY DISEASE INVOLVING NATIVE CORONARY ARTERY OF NATIVE HEART WITHOUT ANGINA PECTORIS: ICD-10-CM

## 2019-10-10 DIAGNOSIS — Z95.0 PRESENCE OF PERMANENT CARDIAC PACEMAKER: ICD-10-CM

## 2019-10-10 DIAGNOSIS — I48.0 PAROXYSMAL ATRIAL FIBRILLATION (H): ICD-10-CM

## 2019-10-10 DIAGNOSIS — I42.2 HYPERTROPHIC CARDIOMYOPATHY (H): ICD-10-CM

## 2019-10-10 DIAGNOSIS — E66.01 MORBID OBESITY (H): ICD-10-CM

## 2019-10-10 PROCEDURE — 99214 OFFICE O/P EST MOD 30 MIN: CPT | Performed by: INTERNAL MEDICINE

## 2019-10-10 ASSESSMENT — MIFFLIN-ST. JEOR: SCORE: 2101.53

## 2019-10-10 NOTE — LETTER
10/10/2019      Dontrell Gómez MD  [address]  [place] [address]      RE: Erich Craven       Dear Colleague,    I had the pleasure of seeing Erich Craven in the [facility].    Service Date: 10/10/2019      REFERRING PHYSICIAN:  Dr. Dontrell Gómez.      HISTORY OF PRESENT ILLNESS:  It is my pleasure to see your patient, Erich Craven.  He is a very pleasant 79-year-old man who has a history of coronary artery disease.  He had stenting of the proximal left anterior descending artery and of the mid and proximal right coronary artery in 2014.  He also carries a history of obstructive sleep apnea and has had a permanent pacemaker implanted for conduction system disease in the past.  He has a history of paroxysmal atrial fibrillation.  He also carries the diagnosis of heart failure with preserved ejection fraction.  He had an echocardiogram performed outside in the past which showed grade 1 diastolic dysfunction and EF of 66%.  His left atrium was normal in size, which is very unusual in a patient with a diagnosis of congestive heart failure.  It was felt that he had low to normal left ventricular filling pressures on the [facility] echocardiogram.  The only abnormality that was interesting was that there was significant prominence of the basal sigmoid septum at 19 mmHg.  The patient is taking furosemide 60 mg twice a day.  With the conflicting physical examination and objective findings on echocardiography when I last saw him in April,  we measured a proBNP.  The proBNP level was totally normal.  This was measured on 04/30 at 71 and repeated again on 09/11 and the proBNP is 48.  This is very strong evidence against congestive heart failure.  The peripheral edema may well be due to conditions other than congestive heart failure.  He has symptoms of a viral upper respiratory tract infection at present.  His blood pressure is excellent at 121/71, his pulse is 79 beats per  minute.  Last lipid profile was the end of February of this year showing an LDL of 54, HDL of 43 and triglycerides of 151.  His pacemaker device is due to be checked on 11/12/2019.  Defibrillator shows PAT with the longest episode being 13 seconds.  High heart rates were noted and these were associated with PAT.        IMPRESSION:   1.  Heart failure with preserved ejection fraction.  This diagnosis is somewhat in doubt.  There are echo findings and laboratory findings which are against this being congestive heart failure.   2.  Coronary artery disease.  The patient is asymptomatic with respect to coronary artery disease and status post stenting of the LAD and right coronary artery.   3.  Normotensive.   4.  Morbid obesity with a weight of 298 pounds and a BMI of 40.      PLAN:  To confirm for once and for all whether this patient truly has heart failure I think we should do a right heart catheterization.  I have asked him not to take the Zaroxolyn medication and just to continue his furosemide medication.  I explained the risks and benefits of the procedure including the risk of bleeding, vascular damage, hematoma formation, cardiac rupture, and the risks associated with conscious sedation including aspiration and intubation.  The patient told me he understood why we are doing the procedure.  He told me he understood how the procedure was performed.  He told me he understood the risks associated with the procedure and finally he told me that he wished to proceed.  I also explained to the patient that he would have to have somebody to drive him home after the procedure.  We will have the patient follow up in [facility] after the results of the right heart catheterization is available to us.  I will see the patient back again myself in approximately 6 months' time.      Many thanks for allowing me to be involved in the care of this very nice patient.  We look forward to seeing him again.      cc:   Dontrell ALEGRE  MD Dalia   [address]   [place]  [address]         DAGO JAIMES MD, [facility]             D: 10/10/2019   T: 10/10/2019   MT: [id number]      Name:     KHUSHBOO NINO   MRN:      [id number]        Account:      [id number]   :      1940           Service Date: 10/10/2019      Document: [id number]           Outpatient Encounter Medications as of 10/10/2019   Medication Sig Dispense Refill     Acetylcarnitine HCl (ACETYL L-CARNITINE) 500 MG CAPS Take 1 capsule by mouth 2 times daily       albuterol (PROAIR HFA/PROVENTIL HFA/VENTOLIN HFA) 108 (90 Base) MCG/ACT inhaler Inhale 2 puffs into the lungs every 4 hours as needed for shortness of breath / dyspnea or wheezing 1 Inhaler 0     aspirin (ASA) 81 MG chewable tablet Take 1 tablet by mouth daily       atorvastatin (LIPITOR) 40 MG tablet Take 1 tablet (40 mg) by mouth every evening 90 tablet 3     carvedilol (COREG) 12.5 MG tablet Take 12.5 mg by mouth 2 times daily       clonazePAM (KLONOPIN) 0.5 MG tablet TAKE 1 TABLET BY MOUTH TWICE A DAY AS NEEDED 60 tablet 0     co-enzyme Q-10 100 MG CAPS capsule Take 100 mg by mouth daily       diltiazem ER COATED BEADS (CARDIZEM CD/CARTIA XT) 300 MG 24 hr capsule Take 1 capsule (300 mg) by mouth daily 90 capsule 0     furosemide (LASIX) 20 MG tablet TAKE 3 TABLETS BY MOUTH TWICE DAILY. 540 tablet 0     isosorbide mononitrate (IMDUR) 30 MG 24 hr tablet Take 1 tablet (30 mg) by mouth daily 90 tablet 3     levothyroxine (SYNTHROID/LEVOTHROID) 175 MCG tablet Take 1 tablet by mouth daily Additional 1/2 tablet on Sundays       lisinopril (PRINIVIL/ZESTRIL) 40 MG tablet Take 40 mg by mouth daily       metolazone (ZAROXOLYN) 2.5 MG tablet Take 1 tablet (2.5 mg) by mouth in the morning, Mon and Thur On Thursday 30 tablet 3     Multiple vitamin TABS Take 1 tablet by mouth daily       potassium chloride ER (K-TAB/KLOR-CON) 10 MEQ CR tablet Take 10 mEq by mouth 2 times daily        tamsulosin (FLOMAX) 0.4 MG capsule Take 1 capsule (0.4 mg) by mouth daily 90 capsule 3     Taurine 500 MG CAPS Take 500 mg by mouth 2 times daily       venlafaxine (EFFEXOR) 37.5 MG tablet Take 37.5 mg by mouth 2 times daily       warfarin ANTICOAGULANT (COUMADIN) 5 MG tablet TAKE 1-1.5 TABLETS (5-7.5 MG) BY MOUTH DAILY AS DIRECTED BY INR CLINIC 135 tablet 0     No facility-administered encounter medications on file as of 10/10/2019.              Again, thank you for allowing me to participate in the care of your patient.      Sincerely,    Brent Portillo MD, MD     [facility]

## 2019-10-10 NOTE — PROGRESS NOTES
HPI and Plan:   See dictation    Orders Placed This Encounter   Procedures     Basic metabolic panel     N terminal pro BNP outpatient     Follow-Up with [facility]- [facility] Visit     Follow-Up with Cardiologist       No orders of the defined types were placed in this encounter.      There are no discontinued medications.      Encounter Diagnoses   Name Primary?     Heart failure with preserved ejection fraction, unspecified HF chronicity (H)      Paroxysmal atrial fibrillation (H)      RIN (obstructive sleep apnea)      Morbid obesity (H)      Presence of permanent cardiac pacemaker      Coronary artery disease involving native coronary artery of native heart without angina pectoris      Hypertrophic cardiomyopathy (H)        CURRENT MEDICATIONS:  Current Outpatient Medications   Medication Sig Dispense Refill     Acetylcarnitine HCl (ACETYL L-CARNITINE) 500 MG CAPS Take 1 capsule by mouth 2 times daily       albuterol (PROAIR HFA/PROVENTIL HFA/VENTOLIN HFA) 108 (90 Base) MCG/ACT inhaler Inhale 2 puffs into the lungs every 4 hours as needed for shortness of breath / dyspnea or wheezing 1 Inhaler 0     aspirin (ASA) 81 MG chewable tablet Take 1 tablet by mouth daily       atorvastatin (LIPITOR) 40 MG tablet Take 1 tablet (40 mg) by mouth every evening 90 tablet 3     carvedilol (COREG) 12.5 MG tablet Take 12.5 mg by mouth 2 times daily       clonazePAM (KLONOPIN) 0.5 MG tablet TAKE 1 TABLET BY MOUTH TWICE A DAY AS NEEDED 60 tablet 0     co-enzyme Q-10 100 MG CAPS capsule Take 100 mg by mouth daily       diltiazem ER COATED BEADS (CARDIZEM CD/CARTIA XT) 300 MG 24 hr capsule Take 1 capsule (300 mg) by mouth daily 90 capsule 0     furosemide (LASIX) 20 MG tablet TAKE 3 TABLETS BY MOUTH TWICE DAILY. 540 tablet 0     isosorbide mononitrate (IMDUR) 30 MG 24 hr tablet Take 1 tablet (30 mg) by mouth daily 90 tablet 3     levothyroxine (SYNTHROID/LEVOTHROID) 175 MCG tablet Take 1 tablet by mouth daily Additional 1/2 tablet on  Sundays       lisinopril (PRINIVIL/ZESTRIL) 40 MG tablet Take 40 mg by mouth daily       metolazone (ZAROXOLYN) 2.5 MG tablet Take 1 tablet (2.5 mg) by mouth in the morning, Mon and Thur On Thursday 30 tablet 3     Multiple vitamin TABS Take 1 tablet by mouth daily       potassium chloride ER (K-TAB/KLOR-CON) 10 MEQ CR tablet Take 10 mEq by mouth 2 times daily       tamsulosin (FLOMAX) 0.4 MG capsule Take 1 capsule (0.4 mg) by mouth daily 90 capsule 3     Taurine 500 MG CAPS Take 500 mg by mouth 2 times daily       venlafaxine (EFFEXOR) 37.5 MG tablet Take 37.5 mg by mouth 2 times daily       warfarin ANTICOAGULANT (COUMADIN) 5 MG tablet TAKE 1-1.5 TABLETS (5-7.5 MG) BY MOUTH DAILY AS DIRECTED BY INR CLINIC 135 tablet 0       ALLERGIES     Allergies   Allergen Reactions     No Known Allergies        PAST MEDICAL HISTORY:  Past Medical History:   Diagnosis Date     (HFpEF) heart failure with preserved ejection fraction (H)      BPH with urinary obstruction      CAD (coronary artery disease)     PCI w/ SUMMER in 2014     Cardiac pacemaker in situ      Chronic diastolic heart failure (H)      COPD (chronic obstructive pulmonary disease) (H)      DVT (deep venous thrombosis) (H)      Dyspnea      Essential hypertension      Fatigue      HLD (hyperlipidemia)      Hypothyroidism      Lymphedema      Obesity, Class III, BMI 40-49.9 (morbid obesity) (H)      RIN (obstructive sleep apnea)     has refused CPAP     Paroxysmal A-fib (H)      PE (pulmonary thromboembolism) (H)        PAST SURGICAL HISTORY:  Past Surgical History:   Procedure Laterality Date     cardiac stenting       ENT SURGERY      tonsillectomy     IMPLANT PACEMAKER       ORTHOPEDIC SURGERY Right 2012    knee replacement       FAMILY HISTORY:  Family History   Problem Relation Age of Onset     Hypertension Father        SOCIAL HISTORY:  Social History     Socioeconomic History     Marital status:      Spouse name: None     Number of children: None      Years of education: None     Highest education level: None   Occupational History     None   Social Needs     Financial resource strain: None     Food insecurity:     Worry: None     Inability: None     Transportation needs:     Medical: None     Non-medical: None   Tobacco Use     Smoking status: Former Smoker     Packs/day: 0.50     Years: 25.00     Pack years: 12.50     Smokeless tobacco: Never Used     Tobacco comment: quit in 1989   Substance and Sexual Activity     Alcohol use: No     Frequency: Never     Comment: quit in 1989     Drug use: No     Sexual activity: Not Currently     Partners: Female   Lifestyle     Physical activity:     Days per week: None     Minutes per session: None     Stress: None   Relationships     Social connections:     Talks on phone: None     Gets together: None     Attends [employer] service: None     Active member of club or organization: None     Attends meetings of clubs or organizations: None     Relationship status: None     Intimate partner violence:     Fear of current or ex partner: None     Emotionally abused: None     Physically abused: None     Forced sexual activity: None   Other Topics Concern     Parent/sibling w/ CABG, MI or angioplasty before 65F 55M? Not Asked   Social History Narrative     None       Review of Systems:  Skin:  Positive for    recently diagnosed w/ skin cancer.being treated   Eyes:  Negative      ENT:  Negative      Respiratory:  Positive for dyspnea on exertion;CPAP;sleep apnea;shortness of breath has SOB residual from virus that put him in the ED.using CPAP   Cardiovascular:    Positive for;edema;fatigue    Gastroenterology: Negative      Genitourinary:  Negative      Musculoskeletal:  Positive for arthritis(patient in PT for both shoulders)    Neurologic:  Negative      Psychiatric:  Negative      Heme/Lymph/Imm:  Negative      Endocrine:  Negative        Physical Exam:  Vitals: /71   Pulse 79   Ht 1.829 m (6')   Wt 135.4 kg (298 lb 6.4  oz)   BMI 40.47 kg/m      Constitutional:  cooperative, alert and oriented, well developed, well nourished, in no acute distress        Skin:  warm and dry to the touch venous stasis changes        Head:  no masses or lesions        Eyes:  pupils equal and round        Lymph:No Cervical lymphadenopathy present     ENT:  no pallor or cyanosis        Neck:  carotid pulses are full and equal bilaterally, JVP normal, no carotid bruit        Respiratory:  normal breath sounds, clear to auscultation, normal A-P diameter, normal symmetry, normal respiratory excursion, no use of accessory muscles         Cardiac: regular rhythm, normal S1/S2, no S3 or S4, apical impulse not displaced, no murmurs, gallops or rubs   distant heart sounds            pulses full and equal, no bruits auscultated                                        GI:    obese      Extremities and Muscular Skeletal:  no deformities, clubbing, cyanosis, erythema observed stasis pigmentation bilateral LE edema;2+          Neurological:  no gross motor deficits;affect appropriate        Psych:  Alert and Oriented x 3        CC  Brent Portillo MD  [address]  [place] [id number]

## 2019-10-10 NOTE — LETTER
10/10/2019    Dontrell Gómez MD  [address]  [place] [address]    RE: Erich Herber       Dear Colleague,    I had the pleasure of seeing Erich Herber in the [facility].    HPI and Plan:   See dictation    Orders Placed This Encounter   Procedures     Basic metabolic panel     N terminal pro BNP outpatient     [facility] with [facility]- [facility]     Follow-Up with Cardiologist       No orders of the defined types were placed in this encounter.      There are no discontinued medications.      Encounter Diagnoses   Name Primary?     Heart failure with preserved ejection fraction, unspecified HF chronicity (H)      Paroxysmal atrial fibrillation (H)      RIN (obstructive sleep apnea)      Morbid obesity (H)      Presence of permanent cardiac pacemaker      Coronary artery disease involving native coronary artery of native heart without angina pectoris      Hypertrophic cardiomyopathy (H)        CURRENT MEDICATIONS:  Current Outpatient Medications   Medication Sig Dispense Refill     Acetylcarnitine HCl (ACETYL L-CARNITINE) 500 MG CAPS Take 1 capsule by mouth 2 times daily       albuterol (PROAIR HFA/PROVENTIL HFA/VENTOLIN HFA) 108 (90 Base) MCG/ACT inhaler Inhale 2 puffs into the lungs every 4 hours as needed for shortness of breath / dyspnea or wheezing 1 Inhaler 0     aspirin (ASA) 81 MG chewable tablet Take 1 tablet by mouth daily       atorvastatin (LIPITOR) 40 MG tablet Take 1 tablet (40 mg) by mouth every evening 90 tablet 3     carvedilol (COREG) 12.5 MG tablet Take 12.5 mg by mouth 2 times daily       clonazePAM (KLONOPIN) 0.5 MG tablet TAKE 1 TABLET BY MOUTH TWICE A DAY AS NEEDED 60 tablet 0     co-enzyme Q-10 100 MG CAPS capsule Take 100 mg by mouth daily       diltiazem ER COATED BEADS (CARDIZEM CD/CARTIA XT) 300 MG 24 hr capsule Take 1 capsule (300 mg) by mouth daily 90 capsule 0     furosemide (LASIX) 20 MG tablet TAKE 3 TABLETS BY MOUTH TWICE DAILY. 540  tablet 0     isosorbide mononitrate (IMDUR) 30 MG 24 hr tablet Take 1 tablet (30 mg) by mouth daily 90 tablet 3     levothyroxine (SYNTHROID/LEVOTHROID) 175 MCG tablet Take 1 tablet by mouth daily Additional 1/2 tablet on Sundays       lisinopril (PRINIVIL/ZESTRIL) 40 MG tablet Take 40 mg by mouth daily       metolazone (ZAROXOLYN) 2.5 MG tablet Take 1 tablet (2.5 mg) by mouth in the morning, Mon and Thur On Thursday 30 tablet 3     Multiple vitamin TABS Take 1 tablet by mouth daily       potassium chloride ER (K-TAB/KLOR-CON) 10 MEQ CR tablet Take 10 mEq by mouth 2 times daily       tamsulosin (FLOMAX) 0.4 MG capsule Take 1 capsule (0.4 mg) by mouth daily 90 capsule 3     Taurine 500 MG CAPS Take 500 mg by mouth 2 times daily       venlafaxine (EFFEXOR) 37.5 MG tablet Take 37.5 mg by mouth 2 times daily       warfarin ANTICOAGULANT (COUMADIN) 5 MG tablet TAKE 1-1.5 TABLETS (5-7.5 MG) BY MOUTH DAILY AS DIRECTED BY INR CLINIC 135 tablet 0       ALLERGIES     Allergies   Allergen Reactions     No Known Allergies        PAST MEDICAL HISTORY:  Past Medical History:   Diagnosis Date     (HFpEF) heart failure with preserved ejection fraction (H)      BPH with urinary obstruction      CAD (coronary artery disease)     PCI w/ SUMMER in 2014     Cardiac pacemaker in situ      Chronic diastolic heart failure (H)      COPD (chronic obstructive pulmonary disease) (H)      DVT (deep venous thrombosis) (H)      Dyspnea      Essential hypertension      Fatigue      HLD (hyperlipidemia)      Hypothyroidism      Lymphedema      Obesity, Class III, BMI 40-49.9 (morbid obesity) (H)      RIN (obstructive sleep apnea)     has refused CPAP     Paroxysmal A-fib (H)      PE (pulmonary thromboembolism) (H)        PAST SURGICAL HISTORY:  Past Surgical History:   Procedure Laterality Date     cardiac stenting       ENT SURGERY      tonsillectomy     IMPLANT PACEMAKER       ORTHOPEDIC SURGERY Right 2012    knee replacement       FAMILY  HISTORY:  Family History   Problem Relation Age of Onset     Hypertension Father        SOCIAL HISTORY:  Social History     Socioeconomic History     Marital status:      Spouse name: None     Number of children: None     Years of education: None     Highest education level: None   Occupational History     None   Social Needs     Financial resource strain: None     Food insecurity:     Worry: None     Inability: None     Transportation needs:     Medical: None     Non-medical: None   Tobacco Use     Smoking status: Former Smoker     Packs/day: 0.50     Years: 25.00     Pack years: 12.50     Smokeless tobacco: Never Used     Tobacco comment: quit in 1989   Substance and Sexual Activity     Alcohol use: No     Frequency: Never     Comment: quit in 1989     Drug use: No     Sexual activity: Not Currently     Partners: Female   Lifestyle     Physical activity:     Days per week: None     Minutes per session: None     Stress: None   Relationships     Social connections:     Talks on phone: None     Gets together: None     Attends [employer] service: None     Active member of club or organization: None     Attends meetings of clubs or organizations: None     Relationship status: None     Intimate partner violence:     Fear of current or ex partner: None     Emotionally abused: None     Physically abused: None     Forced sexual activity: None   Other Topics Concern     Parent/sibling w/ CABG, MI or angioplasty before 65F 55M? Not Asked   Social History Narrative     None       Review of Systems:  Skin:  Positive for    recently diagnosed w/ skin cancer.being treated   Eyes:  Negative      ENT:  Negative      Respiratory:  Positive for dyspnea on exertion;CPAP;sleep apnea;shortness of breath has SOB residual from virus that put him in the ED.using CPAP   Cardiovascular:    Positive for;edema;fatigue    Gastroenterology: Negative      Genitourinary:  Negative      Musculoskeletal:  Positive for arthritis(patient in PT  for both shoulders)    Neurologic:  Negative      Psychiatric:  Negative      Heme/Lymph/Imm:  Negative      Endocrine:  Negative        Physical Exam:  Vitals: /71   Pulse 79   Ht 1.829 m (6')   Wt 135.4 kg (298 lb 6.4 oz)   BMI 40.47 kg/m       Constitutional:  cooperative, alert and oriented, well developed, well nourished, in no acute distress        Skin:  warm and dry to the touch venous stasis changes        Head:  no masses or lesions        Eyes:  pupils equal and round        Lymph:No Cervical lymphadenopathy present     ENT:  no pallor or cyanosis        Neck:  carotid pulses are full and equal bilaterally, JVP normal, no carotid bruit        Respiratory:  normal breath sounds, clear to auscultation, normal A-P diameter, normal symmetry, normal respiratory excursion, no use of accessory muscles         Cardiac: regular rhythm, normal S1/S2, no S3 or S4, apical impulse not displaced, no murmurs, gallops or rubs   distant heart sounds            pulses full and equal, no bruits auscultated                                        GI:    obese      Extremities and Muscular Skeletal:  no deformities, clubbing, cyanosis, erythema observed stasis pigmentation bilateral LE edema;2+          Neurological:  no gross motor deficits;affect appropriate        Psych:  Alert and Oriented x 3        CC  Brent Portillo MD  [address]  [place] [id number]                Thank you for allowing me to participate in the care of your patient.      Sincerely,     Brent Portillo MD, MD     [facility]    cc:   Brent Portillo MD  [address]  [place] [id number]

## 2019-10-10 NOTE — PROGRESS NOTES
Service Date: 10/10/2019      REFERRING PHYSICIAN:  Dr. Dontrell Gómez.      HISTORY OF PRESENT ILLNESS:  It is my pleasure to see your patient, Erich Craven.  He is a very pleasant 79-year-old man who has a history of coronary artery disease.  He had stenting of the proximal left anterior descending artery and of the mid and proximal right coronary artery in 2014.  He also carries a history of obstructive sleep apnea and has had a permanent pacemaker implanted for conduction system disease in the past.  He has a history of paroxysmal atrial fibrillation.  He also carries the diagnosis of heart failure with preserved ejection fraction.  He had an echocardiogram performed outside in the past which showed grade 1 diastolic dysfunction and EF of 66%.  His left atrium was normal in size, which is very unusual in a patient with a diagnosis of congestive heart failure.  It was felt that he had low to normal left ventricular filling pressures on the [facility] echocardiogram.  The only abnormality that was interesting was that there was significant prominence of the basal sigmoid septum at 19 mmHg.  The patient is taking furosemide 60 mg twice a day.  With the conflicting physical examination and objective findings on echocardiography when I last saw him in April,  we measured a proBNP.  The proBNP level was totally normal.  This was measured on 04/30 at 71 and repeated again on 09/11 and the proBNP is 48.  This is very strong evidence against congestive heart failure.  The peripheral edema may well be due to conditions other than congestive heart failure.  He has symptoms of a viral upper respiratory tract infection at present.  His blood pressure is excellent at 121/71, his pulse is 79 beats per minute.  Last lipid profile was the end of February of this year showing an LDL of 54, HDL of 43 and triglycerides of 151.  His pacemaker device is due to be checked on 11/12/2019.  Defibrillator shows PAT with the longest  episode being 13 seconds.  High heart rates were noted and these were associated with PAT.        IMPRESSION:   1.  Heart failure with preserved ejection fraction.  This diagnosis is somewhat in doubt.  There are echo findings and laboratory findings which are against this being congestive heart failure.   2.  Coronary artery disease.  The patient is asymptomatic with respect to coronary artery disease and status post stenting of the LAD and right coronary artery.   3.  Normotensive.   4.  Morbid obesity with a weight of 298 pounds and a BMI of 40.      PLAN:  To confirm for once and for all whether this patient truly has heart failure I think we should do a right heart catheterization.  I have asked him not to take the Zaroxolyn medication and just to continue his furosemide medication.  I explained the risks and benefits of the procedure including the risk of bleeding, vascular damage, hematoma formation, cardiac rupture, and the risks associated with conscious sedation including aspiration and intubation.  The patient told me he understood why we are doing the procedure.  He told me he understood how the procedure was performed.  He told me he understood the risks associated with the procedure and finally he told me that he wished to proceed.  I also explained to the patient that he would have to have somebody to drive him home after the procedure.  We will have the patient follow up [facility] after the results of the right heart catheterization is available to us.  I will see the patient back again myself in approximately 6 months' time.      Many thanks for allowing me to be involved in the care of this very nice patient.  We look forward to seeing him again.      cc:   Dontrell [place] [address]         DAGO JAIMES MD, [facility]             D: 10/10/2019   T: 10/10/2019   MT: [id number]      Name:     KHUSHBOO NINO   MRN:       [id number]        Account:      [id number]   :      1940           Service Date: 10/10/2019      Document: [id number]

## 2019-10-21 ENCOUNTER — CARE COORDINATION (OUTPATIENT)
Dept: CARDIOLOGY | Facility: CLINIC | Age: 79
End: 2019-10-21

## 2019-10-21 NOTE — PROGRESS NOTES
Received call from pt stating that he forgot about holding his warfarin over the weekend. He will hold the warfarin starting today for a right heart cath procedure scheduled 10/23/19. Pt asking if he needs to reschedule the right heart cath. Will message Dr. Lo who is scheduled to perform the cath in Dr. Portillo's absence. Jessica PRETTY

## 2019-10-21 NOTE — TELEPHONE ENCOUNTER
Since it is an elective case, I would recommend to reschedule so that INR is adequately down to minimize procedural risks.

## 2019-10-21 NOTE — PROGRESS NOTES
Called pt with recommendations from Dr. Lo that he should reschedule his right heart cath scheduled for Wednesday. Pt states he will call back to reschedule. Jessica PRETTY

## 2019-10-31 NOTE — PROGRESS NOTES
Received call from pt asking about holding his warfarin. Pt advised he should hold the warfarin starting 4 days prior to right heart cath & to hold the furosemide & potassium the morning of the procedure. Pt verbalized understanding. Jessica PRETTY

## 2019-11-03 DIAGNOSIS — F43.21 ADJUSTMENT DISORDER WITH DEPRESSED MOOD: ICD-10-CM

## 2019-11-04 RX ORDER — LIDOCAINE 40 MG/G
CREAM TOPICAL
Status: CANCELLED | OUTPATIENT
Start: 2019-11-04

## 2019-11-04 RX ORDER — SODIUM CHLORIDE 9 MG/ML
INJECTION, SOLUTION INTRAVENOUS CONTINUOUS
Status: CANCELLED | OUTPATIENT
Start: 2019-11-04

## 2019-11-04 RX ORDER — POTASSIUM CHLORIDE 1500 MG/1
20 TABLET, EXTENDED RELEASE ORAL
Status: CANCELLED | OUTPATIENT
Start: 2019-11-04

## 2019-11-04 NOTE — PROGRESS NOTES
Received call from pt stating that he has a cold, but is not running a fever. Pt advised ok to go ahead with the right heart cath as long as he is not running a fever.   Reviewed instructions with pt:  1. NPO after midnight  2. take medications in AM w/ sip of water  3. Pt has been holding Eliquis x 48 hours.   4. Denies being diabetic  5. Pt will hold furosemide & potassium in the AM.   6. Denies dye allergy  7. Advised will need  & responsible adult with them for 24 after procedure.  LPenfield RN

## 2019-11-05 ENCOUNTER — HOSPITAL ENCOUNTER (OUTPATIENT)
Facility: CLINIC | Age: 79
Discharge: HOME | End: 2019-11-05
Admitting: INTERNAL MEDICINE
Payer: MEDICARE

## 2019-11-05 ENCOUNTER — SURGERY (OUTPATIENT)
Age: 79
End: 2019-11-05
Payer: MEDICARE

## 2019-11-05 VITALS
OXYGEN SATURATION: 93 % | SYSTOLIC BLOOD PRESSURE: 142 MMHG | HEIGHT: 72 IN | TEMPERATURE: 98.3 F | WEIGHT: 304.1 LBS | HEART RATE: 73 BPM | BODY MASS INDEX: 41.19 KG/M2 | RESPIRATION RATE: 16 BRPM | DIASTOLIC BLOOD PRESSURE: 85 MMHG

## 2019-11-05 DIAGNOSIS — I50.30 HEART FAILURE WITH PRESERVED EJECTION FRACTION, UNSPECIFIED HF CHRONICITY (H): ICD-10-CM

## 2019-11-05 PROBLEM — Z98.890 STATUS POST CORONARY ANGIOGRAM: Status: ACTIVE | Noted: 2019-11-05

## 2019-11-05 LAB
ANION GAP SERPL CALCULATED.3IONS-SCNC: 5 MMOL/L (ref 3–14)
APTT PPP: 34 SEC (ref 22–37)
BUN SERPL-MCNC: 14 MG/DL (ref 7–30)
CALCIUM SERPL-MCNC: 8.6 MG/DL (ref 8.5–10.1)
CHLORIDE SERPL-SCNC: 106 MMOL/L (ref 94–109)
CO2 SERPL-SCNC: 27 MMOL/L (ref 20–32)
CREAT SERPL-MCNC: 0.88 MG/DL (ref 0.66–1.25)
ERYTHROCYTE [DISTWIDTH] IN BLOOD BY AUTOMATED COUNT: 16.3 % (ref 10–15)
GFR SERPL CREATININE-BSD FRML MDRD: 81 ML/MIN/{1.73_M2}
GLUCOSE SERPL-MCNC: 123 MG/DL (ref 70–99)
HCT VFR BLD AUTO: 43.4 % (ref 40–53)
HGB BLD-MCNC: 13.8 G/DL (ref 13.3–17.7)
INR PPP: 1.1 (ref 0.86–1.14)
MCH RBC QN AUTO: 31.5 PG (ref 26.5–33)
MCHC RBC AUTO-ENTMCNC: 31.8 G/DL (ref 31.5–36.5)
MCV RBC AUTO: 99 FL (ref 78–100)
PLATELET # BLD AUTO: 201 10E9/L (ref 150–450)
POTASSIUM SERPL-SCNC: 4.1 MMOL/L (ref 3.4–5.3)
RBC # BLD AUTO: 4.38 10E12/L (ref 4.4–5.9)
SODIUM SERPL-SCNC: 138 MMOL/L (ref 133–144)
WBC # BLD AUTO: 10.8 10E9/L (ref 4–11)

## 2019-11-05 PROCEDURE — 36415 COLL VENOUS BLD VENIPUNCTURE: CPT

## 2019-11-05 PROCEDURE — 40000065 ZZH STATISTIC EKG NON-CHARGEABLE

## 2019-11-05 PROCEDURE — 99152 MOD SED SAME PHYS/QHP 5/>YRS: CPT | Performed by: INTERNAL MEDICINE

## 2019-11-05 PROCEDURE — 40000235 ZZH STATISTIC TELEMETRY

## 2019-11-05 PROCEDURE — 36591 DRAW BLOOD OFF VENOUS DEVICE: CPT

## 2019-11-05 PROCEDURE — 27210794 ZZH OR GENERAL SUPPLY STERILE: Performed by: INTERNAL MEDICINE

## 2019-11-05 PROCEDURE — 93010 ELECTROCARDIOGRAM REPORT: CPT | Performed by: INTERNAL MEDICINE

## 2019-11-05 PROCEDURE — 85730 THROMBOPLASTIN TIME PARTIAL: CPT

## 2019-11-05 PROCEDURE — 93451 RIGHT HEART CATH: CPT | Mod: 26 | Performed by: INTERNAL MEDICINE

## 2019-11-05 PROCEDURE — C1769 GUIDE WIRE: HCPCS | Performed by: INTERNAL MEDICINE

## 2019-11-05 PROCEDURE — 80048 BASIC METABOLIC PNL TOTAL CA: CPT

## 2019-11-05 PROCEDURE — 40000852 ZZH STATISTIC HEART CATH LAB OR EP LAB

## 2019-11-05 PROCEDURE — 85027 COMPLETE CBC AUTOMATED: CPT

## 2019-11-05 PROCEDURE — 99153 MOD SED SAME PHYS/QHP EA: CPT

## 2019-11-05 PROCEDURE — 25800030 ZZH RX IP 258 OP 636: Performed by: INTERNAL MEDICINE

## 2019-11-05 PROCEDURE — 93005 ELECTROCARDIOGRAM TRACING: CPT

## 2019-11-05 PROCEDURE — 25000128 H RX IP 250 OP 636: Performed by: INTERNAL MEDICINE

## 2019-11-05 PROCEDURE — 93451 RIGHT HEART CATH: CPT | Performed by: INTERNAL MEDICINE

## 2019-11-05 PROCEDURE — 85610 PROTHROMBIN TIME: CPT

## 2019-11-05 PROCEDURE — 25000125 ZZHC RX 250: Performed by: INTERNAL MEDICINE

## 2019-11-05 RX ORDER — ATROPINE SULFATE 0.1 MG/ML
0.5 INJECTION INTRAVENOUS EVERY 5 MIN PRN
Status: DISCONTINUED | OUTPATIENT
Start: 2019-11-05 | End: 2019-11-05 | Stop reason: HOSPADM

## 2019-11-05 RX ORDER — LIDOCAINE 40 MG/G
CREAM TOPICAL
Status: DISCONTINUED | OUTPATIENT
Start: 2019-11-05 | End: 2019-11-05 | Stop reason: HOSPADM

## 2019-11-05 RX ORDER — NALOXONE HYDROCHLORIDE 0.4 MG/ML
.1-.4 INJECTION, SOLUTION INTRAMUSCULAR; INTRAVENOUS; SUBCUTANEOUS
Status: DISCONTINUED | OUTPATIENT
Start: 2019-11-05 | End: 2019-11-05 | Stop reason: HOSPADM

## 2019-11-05 RX ORDER — FLUMAZENIL 0.1 MG/ML
0.2 INJECTION, SOLUTION INTRAVENOUS
Status: DISCONTINUED | OUTPATIENT
Start: 2019-11-05 | End: 2019-11-05 | Stop reason: HOSPADM

## 2019-11-05 RX ORDER — NALOXONE HYDROCHLORIDE 0.4 MG/ML
.2-.4 INJECTION, SOLUTION INTRAMUSCULAR; INTRAVENOUS; SUBCUTANEOUS
Status: DISCONTINUED | OUTPATIENT
Start: 2019-11-05 | End: 2019-11-05 | Stop reason: HOSPADM

## 2019-11-05 RX ORDER — SODIUM CHLORIDE 9 MG/ML
INJECTION, SOLUTION INTRAVENOUS CONTINUOUS
Status: DISCONTINUED | OUTPATIENT
Start: 2019-11-05 | End: 2019-11-05 | Stop reason: HOSPADM

## 2019-11-05 RX ORDER — FENTANYL CITRATE 50 UG/ML
INJECTION, SOLUTION INTRAMUSCULAR; INTRAVENOUS
Status: DISCONTINUED | OUTPATIENT
Start: 2019-11-05 | End: 2019-11-05 | Stop reason: HOSPADM

## 2019-11-05 RX ORDER — FENTANYL CITRATE 50 UG/ML
25-50 INJECTION, SOLUTION INTRAMUSCULAR; INTRAVENOUS
Status: DISCONTINUED | OUTPATIENT
Start: 2019-11-05 | End: 2019-11-05 | Stop reason: HOSPADM

## 2019-11-05 RX ORDER — POTASSIUM CHLORIDE 1500 MG/1
20 TABLET, EXTENDED RELEASE ORAL
Status: DISCONTINUED | OUTPATIENT
Start: 2019-11-05 | End: 2019-11-05 | Stop reason: HOSPADM

## 2019-11-05 RX ORDER — ACETAMINOPHEN 325 MG/1
650 TABLET ORAL EVERY 4 HOURS PRN
Status: DISCONTINUED | OUTPATIENT
Start: 2019-11-05 | End: 2019-11-05 | Stop reason: HOSPADM

## 2019-11-05 RX ADMIN — SODIUM CHLORIDE: 9 INJECTION, SOLUTION INTRAVENOUS at 07:45

## 2019-11-05 RX ADMIN — FENTANYL CITRATE 50 MCG: 50 INJECTION, SOLUTION INTRAMUSCULAR; INTRAVENOUS at 08:57

## 2019-11-05 RX ADMIN — FENTANYL CITRATE 50 MCG: 50 INJECTION, SOLUTION INTRAMUSCULAR; INTRAVENOUS at 08:52

## 2019-11-05 RX ADMIN — LIDOCAINE HYDROCHLORIDE 10 ML: 10 INJECTION, SOLUTION EPIDURAL; INFILTRATION; INTRACAUDAL; PERINEURAL at 08:58

## 2019-11-05 RX ADMIN — MIDAZOLAM 1 MG: 1 INJECTION INTRAMUSCULAR; INTRAVENOUS at 08:58

## 2019-11-05 RX ADMIN — MIDAZOLAM 1 MG: 1 INJECTION INTRAMUSCULAR; INTRAVENOUS at 08:52

## 2019-11-05 ASSESSMENT — MIFFLIN-ST. JEOR: SCORE: 2127.39

## 2019-11-05 NOTE — PROGRESS NOTES
Care Suites Admission Nursing Note    Reason for admission:Heart cath   CS arrival time:~0645  Accompanied by: Daughter   Name/phone of DC : Edita Craven   Medications held: Yes, coumadin   Consent signed: Yes  Abnormal assessment/labs: No   If abnormal, provider notified: N/A  Education/questions answered: Pre-procedure explained and questions answered.  Contrast discharge instructions reviewed.    Plan: Procedure ~0830

## 2019-11-05 NOTE — TELEPHONE ENCOUNTER
As suspected, patient does not have CHF and has near normal pulmonary pressures. NOT HFpEF. Normal wedge. Hypoxic with sedation. Needs to have sleep study. Thx

## 2019-11-05 NOTE — DISCHARGE INSTRUCTIONS
Cardiac Angiogram Discharge Instructions - Femoral    After you go home:      Have an adult stay with you until tomorrow.    Drink extra fluids for 2 days.    You may resume your normal diet.    No smoking       For 24 hours - due to the sedation you received:    Relax and take it easy.    Do NOT make any important or legal decisions.    Do NOT drive or operate machines at home or at work.    Do NOT drink alcohol.    Care of Groin Puncture Site:      For the first 24 hrs - check the puncture site every 1-2 hours while awake.    For 2 days, when you cough, sneeze, laugh or move your bowels, hold your hand over the puncture site and press firmly.    Remove the bandaid after 24 hours. If there is minor oozing, apply another bandaid and remove it after 12 hours.    It is normal to have a small bruise or pea size lump at the site.    You may shower tomorrow. Do NOT take a bath, or use a hot tub or pool for at least 3 days. Do NOT scrub the site. Do not use lotion or powder near the puncture site.    Activity:            For 2 days:    No stooping or squatting    Do NOT do any heavy activity such as exercise, lifting, or straining.     No housework, yard work or any activity that make you sweat    Do NOT lift more than 10 pounds    Bleeding:      If you start bleeding from the site in your groin, lie down flat and press firmly on/above the site for 10 minutes.     Once bleeding stops, lay flat for 2 hours.     Call [facility] as soon as you can.       Call 911 right away if you have heavy bleeding or bleeding that does not stop.      Medicines:      Take your medications, including blood thinners, unless your provider tells you not to.  If you take Coumadin (Warfarin), have your INR checked by your provider in  3-5 days. Call your clinic to schedule this.    If you have stopped any medicines, check with your provider about when to restart them.    Follow Up Appointments:      Follow up with [facility] Nurse Practitioner at  [facility] of patient preference in 7-10 days.    Call the clinic if:      You have increased pain or a large or growing hard lump around the site.    The site is red, swollen, hot or tender.    Blood or fluid is draining from the site.    You have chills or a fever greater than 101 F (38 C).    Your leg feels numb, cool or changes color.    You have hives, a rash or unusual itching.    New pain in the back or belly that you cannot control with Tylenol.    Any questions or concerns.          [facility] [place]:    [phone] [facility] (7 days a week)

## 2019-11-05 NOTE — PRE-PROCEDURE
GENERAL PRE-PROCEDURE:   Procedure:  Right heart cath  Date/Time:  11/5/2019 7:49 AM    Verbal consent obtained?: Yes    Risks and benefits: Risks, benefits and alternatives were discussed    Consent given by:  Patient  Patient states understanding of procedure being performed: Yes    Patient's understanding of procedure matches consent: Yes    Procedure consent matches procedure scheduled: Yes    Appropriately NPO:  Yes  ASA Class:  Class 2- mild systemic disease, no acute problems, no functional limitations  Mallampati  :  Grade 2- soft palate, base of uvula, tonsillar pillars, and portion of posterior pharyngeal wall visible  Lungs:  Lungs clear with good breath sounds bilaterally  Heart:  Normal heart sounds and rate  History & Physical reviewed:  History and physical reviewed and no updates needed  Statement of review:  I have reviewed the lab findings, diagnostic data, medications, and the plan for sedation

## 2019-11-05 NOTE — PROGRESS NOTES
Care Suites Discharge Nursing Note    Education/questions answered: Yes  Patient DC location: home  Accompanied by: daughter   CS discharge time: ~1200

## 2019-11-05 NOTE — PROGRESS NOTES
Reviewed right heart cath report showing     Right sided filling pressures are mildly elevated.    Normal PA pressures.    Left sided filling pressures are normal.    Normal cardiac output level.     1. Generally normal PA and wedge pressure with mildly elevated RA filling pressure. Mild peripheral edema could correlate with the elevated CVP  2. Normal PVR,SVR. The diastolic filling gradient and the transpulmonary gradient are mildly elevated but there is essentially no pulmonary hypertension so significance of these findings is uncertain  3. With mild sedation and pt wide awake systemic hypoxia (O2 sat down  To 86-87%) was noted. Correlation with sleep study recommended.    Pt seeing Destiny Decker NP 11/12/19; will message Dr. Portillo to review. Jessica PRETTY

## 2019-11-05 NOTE — TELEPHONE ENCOUNTER
Last Written Prescription Date:  10/7/2019  Last Fill Quantity: 60,  # refills: 0   Last office visit: 10/7/2019 with prescribing provider:  pcp   Future Office Visit:   Next 5 appointments (look out 90 days)    Nov 12, 2019  2:30 PM CST  Return Visit with VAMSI West CNP  [facility] ([facility]) [address] [address]  [phone] OPT 2   Nov 18, 2019  2:30 PM CST  Office Visit with Dontrell G[facility]mez MD  [facility] [address] [facility] [address] [phone]   Dec 11, 2019  9:00 AM CST  Office Visit with Dontrell Gómez MD  [facility] [address] [facility] [address] [phone]         Requested Prescriptions   Pending Prescriptions Disp Refills     clonazePAM (KLONOPIN) 0.5 MG tablet [Pharmacy Med Name: CLONAZEPAM 0.5 MG TABLET] 60 tablet 0     Sig: TAKE 1 TABLET BY MOUTH TWICE A DAY AS NEEDED       There is no refill protocol information for this order      Future refills by PCP Dr. Dontrell Gómez with phone number [phone].

## 2019-11-05 NOTE — PROGRESS NOTES
Care Suites Post-Procedure Note    Procedure:Heart cath   CS arrival time: ~0930  Accompanied by: Lis   Concerns/abnormal assessment after procedure: Site CDI, +cms and dopplers.    Plan: Bedrest until ~1115 and home when stable.

## 2019-11-05 NOTE — TELEPHONE ENCOUNTER
Adjustment disorder with depressed mood [F43.21]      Pharmacy requiring Dx code.  I have added the above to the pended Rx per previous Clonazepam refills.     Rita York RN on 11/5/2019 at 3:52 PM

## 2019-11-06 LAB — INTERPRETATION ECG - MUSE: NORMAL

## 2019-11-06 RX ORDER — CLONAZEPAM 0.5 MG/1
TABLET ORAL
Qty: 60 TABLET | Refills: 0 | Status: SHIPPED | OUTPATIENT
Start: 2019-11-06 | End: 2019-11-29

## 2019-11-07 NOTE — PROGRESS NOTES
Will message Destiny Decker to review at recommendations from Dr. Portillo at upcoming office visit. Jessica PRETTY

## 2019-11-11 ENCOUNTER — TELEPHONE (OUTPATIENT)
Dept: FAMILY MEDICINE | Facility: CLINIC | Age: 79
End: 2019-11-11

## 2019-11-11 NOTE — TELEPHONE ENCOUNTER
Left a detailed message for pt to call the clinic to schedule an INR recheck this week if possible. Pt recently had a procedure and had to HOLD warfarin X 4 days. On 11/5/19, pt INR was 1.1 at the hospital. Pt currently scheduled to see  on 11/18/19 for follow up and to have an INR check at that time as well. Pt advised that it might be a good idea to have the INR checked sooner than 11/18/19.

## 2019-11-12 ENCOUNTER — ANTICOAGULATION THERAPY VISIT (OUTPATIENT)
Dept: NURSING | Facility: CLINIC | Age: 79
End: 2019-11-12
Payer: MEDICARE

## 2019-11-12 ENCOUNTER — ANCILLARY PROCEDURE (OUTPATIENT)
Dept: CARDIOLOGY | Facility: CLINIC | Age: 79
End: 2019-11-12
Attending: INTERNAL MEDICINE
Payer: MEDICARE

## 2019-11-12 ENCOUNTER — OFFICE VISIT (OUTPATIENT)
Dept: CARDIOLOGY | Facility: CLINIC | Age: 79
End: 2019-11-12
Payer: MEDICARE

## 2019-11-12 ENCOUNTER — OFFICE VISIT (OUTPATIENT)
Dept: FAMILY MEDICINE | Facility: CLINIC | Age: 79
End: 2019-11-12
Payer: MEDICARE

## 2019-11-12 VITALS
HEART RATE: 101 BPM | WEIGHT: 312.7 LBS | SYSTOLIC BLOOD PRESSURE: 150 MMHG | DIASTOLIC BLOOD PRESSURE: 66 MMHG | BODY MASS INDEX: 42.35 KG/M2 | HEIGHT: 72 IN

## 2019-11-12 VITALS
HEART RATE: 72 BPM | TEMPERATURE: 97.7 F | BODY MASS INDEX: 42.12 KG/M2 | WEIGHT: 311 LBS | DIASTOLIC BLOOD PRESSURE: 84 MMHG | OXYGEN SATURATION: 93 % | SYSTOLIC BLOOD PRESSURE: 150 MMHG | HEIGHT: 72 IN

## 2019-11-12 DIAGNOSIS — I50.32 CHRONIC DIASTOLIC HEART FAILURE (H): ICD-10-CM

## 2019-11-12 DIAGNOSIS — I25.10 CORONARY ARTERY DISEASE INVOLVING NATIVE CORONARY ARTERY OF NATIVE HEART WITHOUT ANGINA PECTORIS: Primary | ICD-10-CM

## 2019-11-12 DIAGNOSIS — I10 ESSENTIAL HYPERTENSION: ICD-10-CM

## 2019-11-12 DIAGNOSIS — E78.49 OTHER HYPERLIPIDEMIA: ICD-10-CM

## 2019-11-12 DIAGNOSIS — R60.0 BILATERAL LOWER EXTREMITY EDEMA: ICD-10-CM

## 2019-11-12 DIAGNOSIS — H61.23 BILATERAL IMPACTED CERUMEN: Primary | ICD-10-CM

## 2019-11-12 DIAGNOSIS — E66.01 MORBID OBESITY (H): ICD-10-CM

## 2019-11-12 DIAGNOSIS — I50.30 HEART FAILURE WITH PRESERVED EJECTION FRACTION, UNSPECIFIED HF CHRONICITY (H): ICD-10-CM

## 2019-11-12 DIAGNOSIS — G47.33 OSA (OBSTRUCTIVE SLEEP APNEA): ICD-10-CM

## 2019-11-12 DIAGNOSIS — Z95.0 CARDIAC PACEMAKER IN SITU: ICD-10-CM

## 2019-11-12 LAB — INR POINT OF CARE: 1.9 (ref 0.86–1.14)

## 2019-11-12 PROCEDURE — 99207 ZZC NO CHARGE NURSE ONLY: CPT

## 2019-11-12 PROCEDURE — 69210 REMOVE IMPACTED EAR WAX UNI: CPT | Performed by: NURSE PRACTITIONER

## 2019-11-12 PROCEDURE — 85610 PROTHROMBIN TIME: CPT | Mod: QW

## 2019-11-12 PROCEDURE — 36416 COLLJ CAPILLARY BLOOD SPEC: CPT

## 2019-11-12 PROCEDURE — 93296 REM INTERROG EVL PM/IDS: CPT | Performed by: INTERNAL MEDICINE

## 2019-11-12 PROCEDURE — 99214 OFFICE O/P EST MOD 30 MIN: CPT | Mod: 24 | Performed by: NURSE PRACTITIONER

## 2019-11-12 PROCEDURE — 93294 REM INTERROG EVL PM/LDLS PM: CPT | Performed by: INTERNAL MEDICINE

## 2019-11-12 ASSESSMENT — MIFFLIN-ST. JEOR
SCORE: 2166.4
SCORE: 2158.69

## 2019-11-12 NOTE — TELEPHONE ENCOUNTER
"Requested Prescriptions   Pending Prescriptions Disp Refills     furosemide (LASIX) 20 MG tablet 540 tablet 0     Sig: TAKE 3 TABLETS BY MOUTH TWICE DAILY.   Last Written Prescription Date:  5/6/2019  Last Fill Quantity: 540,  # refills: 0   Last office visit: 11/12/2019 with prescribing provider:  Jude   Future Office Visit:   Next 5 appointments (look out 90 days)    Nov 18, 2019  2:30 PM CST  Office Visit with Dontrell Gómez MD  [facility] [address] [facility] [address]  [phone]   Dec 11, 2019  9:00 AM CST  Office Visit with Dontrell Gómez MD  [facility]) [address] [facility] [address]  [phone]   Dec 17, 2019  9:30 AM CST  Return Visit with VAMSI West [facility] ([facility]) [address] [facility] [address]  [facility] [address] [phone] OPT 2             Diuretics (Including Combos) Protocol Failed - 11/12/2019  8:59 AM        Failed - Blood pressure under 140/90 in past 12 months     BP Readings from Last 3 Encounters:   11/12/19 (!) 150/66   11/12/19 (!) 150/84   11/05/19 (!) 142/85                 Passed - Recent (12 mo) or future (30 days) visit within the authorizing provider's specialty     Patient has had an office visit with the authorizing provider or a provider within the authorizing providers department within the previous 12 mos or has a future within next 30 days. See \"Patient Info\" tab in inbasket, or \"Choose Columns\" in Meds & Orders section of the refill encounter.              Passed - Medication is active on med list        Passed - Patient is age 18 or older        Passed - Normal serum creatinine on file in past 12 months     Recent Labs   Lab Test 11/05/19  0657   CR 0.88              Passed - Normal serum potassium on file in past 12 months     Recent Labs   Lab Test 11/05/19  0657   POTASSIUM 4.1                    " "Passed - Normal serum sodium on file in past 12 months     Recent Labs   Lab Test 11/05/19  0657                 potassium chloride ER (K-TAB/KLOR-CON) 10 MEQ CR tablet 90 tablet 3     Sig: Take 1 tablet (10 mEq) by mouth 2 times daily   Last Written Prescription Date:  11/28/2018  Last Fill Quantity: historical,  # refills: historical   Last office visit: 11/12/2019 with prescribing provider:  Jude   Future Office Visit:   Next 5 appointments (look out 90 days)    Nov 18, 2019  2:30 PM CST  Office Visit with Dontrell Gómez MD  [facility]) [address] [facility] [address]  [phone]   Dec 11, 2019  9:00 AM CST  Office Visit with Dontrell Gómez MD  [facility]) [address] [facility] [address]  [phone]   Dec 17, 2019  9:30 AM CST  Return Visit with VAMSI West [facility] ([facility]) [address] [facility] [address]  [facility] [address]  [phone] OPT 2             Potassium Supplements Protocol Passed - 11/12/2019  8:59 AM        Passed - Recent (12 mo) or future (30 days) visit within the authorizing provider's department     Patient has had an office visit with the authorizing provider or a provider within the authorizing providers department within the previous 12 mos or has a future within next 30 days. See \"Patient Info\" tab in inbasket, or \"Choose Columns\" in Meds & Orders section of the refill encounter.              Passed - Medication is active on med list        Passed - Patient is age 18 or older        Passed - Normal serum potassium in past 12 months     Recent Labs   Lab Test 11/05/19  0657   POTASSIUM 4.1                    Routing refill request to provider for review/approval because:  Labs out of range:  BP  Unclear if patient should continue to take K supplement    CHARBEL Contreras, RN  [employer] [facility]          "

## 2019-11-12 NOTE — PROGRESS NOTES
Subjective     Erich Craven is a 79 year old male who presents to clinic today for the following health issues:    HPI   Ear concerns  Patient states cant hear out of left ear. This has been bothering the patient for about a week.   He denies pain ,  Dizziness or other upper respiratory concerns    Patient Active Problem List   Diagnosis     Morbid obesity (H)     PE (pulmonary thromboembolism) (H)     HTN (hypertension)     Chronic diastolic heart failure (H)     Dyspnea     Fatigue     RIN (obstructive sleep apnea)     COPD (chronic obstructive pulmonary disease) (H)     Hypothyroidism     Paroxysmal A-fib (H)     HLD (hyperlipidemia)     Lymphedema     (HFpEF) heart failure with preserved ejection fraction (H)     Essential hypertension     Cardiac pacemaker in situ     DVT (deep venous thrombosis) (H)     CAD (coronary artery disease)     Obesity, Class III, BMI 40-49.9 (morbid obesity) (H)     Muscle tension headache     Left knee pain     Heart failure with preserved ejection fraction, unspecified HF chronicity (H)     Status post coronary angiogram     Past Surgical History:   Procedure Laterality Date     cardiac stenting       CV RIGHT HEART CATH N/A 11/5/2019    Procedure: Right Heart Cath;  Surgeon: Rboerto Hernandez MD;  Location:  HEART CARDIAC CATH LAB     ENT SURGERY      tonsillectomy     IMPLANT PACEMAKER       ORTHOPEDIC SURGERY Right 2012    knee replacement       Social History     Tobacco Use     Smoking status: Former Smoker     Packs/day: 0.50     Years: 25.00     Pack years: 12.50     Smokeless tobacco: Never Used     Tobacco comment: quit in 1989   Substance Use Topics     Alcohol use: No     Frequency: Never     Comment: quit in 1989     Family History   Problem Relation Age of Onset     Hypertension Father          Current Outpatient Medications   Medication Sig Dispense Refill     Acetylcarnitine HCl (ACETYL L-CARNITINE) 500 MG CAPS Take 1 capsule by mouth 2 times daily        albuterol (PROAIR HFA/PROVENTIL HFA/VENTOLIN HFA) 108 (90 Base) MCG/ACT inhaler Inhale 2 puffs into the lungs every 4 hours as needed for shortness of breath / dyspnea or wheezing 1 Inhaler 0     aspirin (ASA) 81 MG chewable tablet Take 1 tablet by mouth daily       atorvastatin (LIPITOR) 40 MG tablet Take 1 tablet (40 mg) by mouth every evening 90 tablet 3     carvedilol (COREG) 12.5 MG tablet Take 12.5 mg by mouth 2 times daily       clonazePAM (KLONOPIN) 0.5 MG tablet TAKE 1 TABLET BY MOUTH TWICE A DAY AS NEEDED 60 tablet 0     co-enzyme Q-10 100 MG CAPS capsule Take 100 mg by mouth daily       diltiazem ER COATED BEADS (CARDIZEM CD/CARTIA XT) 300 MG 24 hr capsule Take 1 capsule (300 mg) by mouth daily 90 capsule 0     furosemide (LASIX) 20 MG tablet TAKE 3 TABLETS BY MOUTH TWICE DAILY. 540 tablet 0     isosorbide mononitrate (IMDUR) 30 MG 24 hr tablet Take 1 tablet (30 mg) by mouth daily 90 tablet 3     levothyroxine (SYNTHROID/LEVOTHROID) 175 MCG tablet Take 1 tablet by mouth daily Additional 1/2 tablet on Sundays       lisinopril (PRINIVIL/ZESTRIL) 40 MG tablet Take 40 mg by mouth daily       metolazone (ZAROXOLYN) 2.5 MG tablet Take 1 tablet (2.5 mg) by mouth in the morning, Mon and Thur On Thursday 30 tablet 3     Multiple vitamin TABS Take 1 tablet by mouth daily       potassium chloride ER (K-TAB/KLOR-CON) 10 MEQ CR tablet Take 10 mEq by mouth 2 times daily       tamsulosin (FLOMAX) 0.4 MG capsule Take 1 capsule (0.4 mg) by mouth daily 90 capsule 3     Taurine 500 MG CAPS Take 500 mg by mouth 2 times daily       venlafaxine (EFFEXOR) 37.5 MG tablet Take 37.5 mg by mouth 2 times daily       warfarin ANTICOAGULANT (COUMADIN) 5 MG tablet TAKE 1-1.5 TABLETS (5-7.5 MG) BY MOUTH DAILY AS DIRECTED BY INR CLINIC 135 tablet 0     Allergies   Allergen Reactions     No Known Allergies        Reviewed and updated as needed this visit by Provider         Review of Systems   ROS COMP: Constitutional, HEENT,  cardiovascular, pulmonary, gi and gu systems are negative, except as otherwise noted.      Objective      BP (!) 150/84 (BP Location: Left arm, Patient Position: Sitting, Cuff Size: Adult Large)   Pulse 72   Temp 97.7  F (36.5  C) (Tympanic)   Ht 1.829 m (6')   Wt 141.1 kg (311 lb)   SpO2 93%   BMI 42.18 kg/m      Physical Exam   GENERAL: healthy, alert and no distress  EYES: Eyes grossly normal to inspection, PERRL and conjunctivae and sclerae normal  HENT: both ear canals are occluded,  multiple failed attempts to lavage the left canal by MA,  TM's not visualized, nose and mouth without ulcers or lesions    Diagnostic Test Results:  Labs reviewed in Epic        Assessment & Plan       ICD-10-CM    1. Bilateral impacted cerumen H61.23 REMOVE IMPACTED CERUMEN   I have requested that he use debrox for a week and RTC for recheck and lavage as necessary of both canals      Patient Instructions       Patient Education       Earwax, Home Treatment    Everyone produces earwax from the lining of the ear canal. It serves to lubricate and protect the ear. The wax that forms in the canal naturally moves toward the outside of the ear and falls out. Sometimes the ear canal may contain too much wax. This can cause a blockage and loss of hearing. Directions are given below for home treatment.  Home care  If your doctor has advised you to remove a wax blockage yourself, follow these directions:    Unless a medicine was prescribed, you may use an over-the-counter product made for clearing earwax. These contain carbamide peroxide. Lie down with the blocked ear facing upward. Apply one dropper full of medicine and wait a few minutes. Grasp the outer ear and wiggle it to help the solution enter the canal.    Lean over a sink or basin with the blocked ear facing downward. Use a bulb syringe filled with warm (not hot or cold) water to rinse the ear several times. Use gentle pressure only.    If you are having trouble draining the  water out of your ear canal, put a few drops of rubbing alcohol (isopropyl alcohol) into the ear canal. This will help remove the remaining water.    Repeat this procedure once a day for up to three days, or until your hearing is back to normal. Do not use this treatment for more than three days in a row.  Don ts    Don t use cold water to rinse the ear. This will make you dizzy.    Don t perform this procedure if you have an ear infection.    Don t perform this procedure if you have a ruptured eardrum.    Don t use cotton swabs, matches, hairpins, keys, or other objects to  clean  the ear canal. This can cause infection of the ear canal or rupture the eardrum. Because of their size and shape, cotton swabs can push earwax deeper into the ear canal instead of removing it.  Follow-up care  Follow up with your health care provider if you are not improving after three cleaning attempts, or as advised.  When to seek medical advice  Call your health care provider right away if any of these occur:    Worsening ear pain    Fever of 101 F (38.3 C) or higher, or as directed by your health care provider    Hearing does not return to normal after three days of treatment    Fluid drainage or bleeding from the ear canal    Swelling, redness, or tenderness of the outer ear    Headache, neck pain, or stiff neck    [id number] [employer]. [address] [place] [address]. All rights reserved. This information is not intended as a substitute for professional medical care. Always follow your healthcare professional's instructions.           Patient Education     Earwax Removal    The ear canal makes earwax from the canal s lining. The ears make wax to lubricate and protect the ear canal. The ear canal is the tube that connects the middle ear to the outside of the ear. The wax protects the ear from bacteria, infection, and damage from water or trauma.  The wax that forms in the canal naturally moves toward the  outside of the ear and falls out. In some cases, the ear may make too much wax. If the wax causes problems or keeps the healthcare provider from seeing into the ear, the extra wax may be removed.  Too much wax can affect your hearing. It can cause itching. In rare cases, it can be painful. Earwax should not be removed unless it is causing a problem. You should not stick objects into your ear to remove wax unless told to do so by your healthcare provider.  Healthcare providers can remove earwax safely. It is important to stay still during the procedure to avoid damage to the ear canal. But removing earwax generally doesn t hurt. You will not usually need anesthesia or pain medicine when the provider removes the earwax.  A number of conditions lead to earwax buildup. These include some skin problems, a narrow ear canal, or ears that make too much earwax. Using cotton swabs in the canal pushes earwax deeper into the ear and contributes to the buildup of earwax.  Home care    The healthcare provider may recommend mineral oil or an over-the-counter eardrop to use at home to soften the earwax. Use these products only if the provider recommends them. Carefully follow the instructions given.    Don t use mineral oil or OTC eardrops if you might have an ear infection or a ruptured eardrum. Tell your healthcare provider right away if you have diabetes or an immune disorder.    Don t use cotton swabs in your ears. Cotton swabs may push wax deeper into the ear canal or damage the eardrum. Use cotton gauze or a wet washcloth  to gently remove wax on the outside of the ear and around the opening to the ear canal.    Don't use any probing device or object such as cotton-tipped swabs or jazmin pins to clean the inside of your ears.    Don t use ear candles to clean your ears. Candling can be dangerous. It can burn the ear canal. It can also make the condition worse instead of better.    Don t use cold water to rinse the ear. This  will make you dizzy. If your provider tells you to rinse your ear, use only warm water or follow his or her instructions.    Check the ear for signs of infection or irritation listed below under When to seek medical advice.  Steps for using eardrops  1. Warm the medicine bottle by rubbing it between your hands for a few minutes.  2. Lie down on your side, with the affected ear up.  3. Place the recommended number of drops in the ear. Wet a cotton ball with the medicine. Gently put the cotton ball into the ear opening.  Follow-up care  Follow up with your healthcare provider, or as directed.  When to seek medical advice  Call the provider right away if you have:    Ear pain that gets worse    Fever of 100.4F F (38 C) or higher, or as directed by your healthcare provider    Worsening wax buildup    Severe pain, dizziness, or nausea    Bleeding from the ear    Hearing problems    Signs of irritation from the eardrops, such as burning, stinging, or swelling and tenderness    Foul-smelling fluid draining from the ear    Swelling, redness, or tenderness of the outer ear    Headache, neck pain, or stiff neck  Date Last Reviewed: 6/1/2017 2000-2018 [employer]. [address] [place], [place] [id number]. All rights reserved. This information is not intended as a substitute for professional medical care. Always follow your healthcare professional's instructions.           Patient Education     Impacted Earwax     Inner ear structures including ear canal and eardrum.     Impacted earwax is a buildup of the natural wax in the ear (cerumen). Impacted earwax is very common. It can cause symptoms such as hearing loss. It can also make it difficult for a doctor to examine your ear.  Understanding earwax  Tiny glands in your ear make substances that combine with dead skin cells to form earwax. Earwax helps protect your ear canal from water, dirt, infection, and injury. Over time, earwax travels from the inner part  of your ear canal to the entrance of the canal. Then it falls away naturally. But in some cases, it can t travel to the entrance of the canal. This may be because of a health condition or objects put in the ear. With age, earwax tends to become harder and less fluid. Older adults are more likely to have problems with earwax buildup.  What causes impacted earwax?  Earwax can build up because of many health conditions. Some cause a physical blockage. Others cause too much earwax to be made. Health conditions that can cause earwax buildup include:    Use of cotton swabs to clean deep in the ear canal    Bony blockage in the ear (osteoma or exostoses)    Infections, such as  infection of the outer ear (external otitis)    Skin disease, such as eczema    Autoimmune diseases, such as lupus    A narrowed ear canal from birth, chronic inflammation, or injury    Too much earwax because of injury    Too much earwax because of  water in the ear canal  Objects repeatedly placed in the ear can also cause impacted earwax. For example, putting cotton swabs in the ear may push the wax deeper into the ear. Over time, this may cause blockage. Hearing aids, swimming plugs, and swim molds can cause the same problem when used again and again.  In some cases, the cause of impacted earwax is not known.  Symptoms of impacted earwax  Excess earwax usually does not cause any symptoms, unless there is a large amount of buildup. Then it may cause symptoms such as:    Hearing loss    Earache    Sense of ear fullness    Itching in the ear    Odor from the ear    Ear drainage    Dizziness    Ringing in the ears    Cough  Treatment for impacted earwax  If you don t have symptoms, you may not need treatment. Often, the earwax goes away on its own with time. If you have symptoms, you may have one or more treatments such as:    Eardrops to soften the earwax. This helps it leave the ear over time.    Rinsing (irrigation) of the ear canal with water.  This is done in a doctor s office.    Removal of the earwax with small tools. This is also done in a doctor s office.  In rare cases, some treatments for earwax removal may cause complications such as:    Infection of the outer ear (otitis external)    Earache    Short-term hearing loss    Dizziness    Water trapped in the ear canal    Hole in the eardrum    Ringing in the ears    Bleeding from the ear  Talk with your healthcare provider about which risks apply most to you.  Don t use these at home  Healthcare providers do not advise use of ear candles or ear vacuum kits. These methods are not shown to work and may cause problems.   Preventing impacted earwax  You may not be able to prevent impacted earwax if you have a health condition that causes it, such as eczema. In other cases, you may be able to prevent earwax buildup by:    Using ear drops once a week    Having routine cleaning of the ear about every 6 months    Not using cotton swabs in the ear  When to call the healthcare provider  Call your healthcare provider if you have symptoms of impacted earwax. Also call right away if you have severe symptoms after earwax removal. These may include bleeding or severe ear pain.   Date Last Reviewed: 5/1/2017 2000-2018 [employer]. [address] [place] [address], [place] [address]. All rights reserved. This information is not intended as a substitute for professional medical care. Always follow your healthcare professional's instructions.         please purchase some debrox and use according to packaging instructions  Return to clinic in a week for another attempt to remove the wax       No follow-ups on file.    VAMSI Islas [facility]

## 2019-11-12 NOTE — TELEPHONE ENCOUNTER
Reason for Call:  Medication or medication refill:    Do you use a [facility]?  Name of the pharmacy and phone number for the current request:    [facility] [id number] [place] [facility], [place] - [address]      Name of the medication requested:   furosemide (LASIX) 20 MG tablet 540 tablet 0 5/6/2019  No   Sig: TAKE 3 TABLETS BY MOUTH TWICE DAILY.   Sent to pharmacy as: furosemide (LASIX) 20 MG tablet   Class: E-Prescribe   Order: [id number]   E-Prescribing Status: Receipt confirmed by pharmacy (5/6/2019  3:41 PM CDT)     And  potassium chloride ER (K-TAB/KLOR-CON) 10 MEQ CR tablet        Call taken on 11/12/2019 at 8:17 AM by Beena Azevedo

## 2019-11-12 NOTE — PROGRESS NOTES
Cardiology Clinic Progress Note  Erich Craven MRN# [id number]   YOB: 1940 Age: 79 year old   Primary Cardiologist: Dr. Keene Reason for visit: Post angiogram follow up             Assessment and Plan:   Erich Craven is a very pleasant 79 year old male with a history of coronary artery disease, paroxsymal atrial fibrillation, s/p PPM for conduction system disease, hypertension, hyperlipidemia, RIN and morbid obesity. Patient here today for follow up after right heart catheterization.    1. Coronary artery disease with history of stenting of his proximal LAD and mid-proximal RCA in 2014. Stable, no signs/symptoms of ischemia.    - Continue aspirin, statin and betablocker therapy   - WEL program referral given today   - Counseled patient on lifestyle modifications    2. Near normal pulmonary pressures and normal wedge on RHC 11/5/19 RA 13mmHg, PA 27mmHg, PCWP 13mmHg and cardiac output 2.81. Unknown weight at time of RHC.     3. Chronic lower extremity edema - not secondary to heart failure. Right heart catheterization completed 11/5/2019 as noted above showed near normal pulmonary pressures and normal wedge pressure. Patient reports 10# weight gain since RHC, which is consistent with weight on clinic scale today with complaints of worsening lower extremity edema (+3 on exam).    - Lymphedema referral given today    - Continue current diuretic regimen (furosemide and metolazone), plans for metolazone Wednesday and Sunday.   - Extensively reviewed low sodium diet and 2L fluid restriction    4. Paroxsymal atrial fibrillation - stable, rate controlled, asymptomatic. Device interrogation today showed 9 mode switches, compromising < 1% of the time. 3 EGMs available show atrial arrhythmia lasting 6-17 seconds, ventricular rates 85-180bpm.   - Continue diltiazem and carvedilol     5. S/p PPM for conduction system disease - stable, continue routine device checks.     6. Hypertension - elevated in clinic  today, typically well controlled, will continue to monitor, if elevated at follow up appointment will titrate hypertension medications.    - Continue carvedilol, diltiazem, isosorbide mononitrate, lisinopril and diuretics (furosemide and metolazone)   - Counseled patient on lifestyle modifications to help manage BP    7. Hyperlipidemia - lipid panel 2/27/19 demonstrated total cholesterol 127, HDL 43, LDL 54 and triglycerides 151   - continue atorvastatin    8. Obstructive sleep apnea - complaint with CPAP for past month, counseled patient on the importance of compliance with CPAP.     9. Morbid obesity - BMP 42.41, counseled patient on weight loss strategies, reviewed techniques to help manage portion size.    - WEL program referral, patient interested developing exercise routine, patient interested in WEL program.         Changes today: none    Follow up plan:     WEL program referral     Lymphedema referral     Cardiology follow up in 1 month with labs prior     Follow up with Dr. Portillo in April 2019        History of Presenting Illness:    Erich Craven is a very pleasant 79 year old male with a history of coronary artery disease, paroxsymal atrial fibrillation, s/p PPM for conduction system disease, hypertension, hyperlipidemia, RIN and morbid obesity.     Patient moved from [facility] this past year to be closer to his family. He established cardiology care with Dr. Portillo. Patient has history of stenting of his proximal LAD and mid-proximal RCA in 2014. Echocardiogram completed in February 2019 at [facility] showed borderline LV enlargement, EF 66%, no regional wall motion abnormalities, grade I diastolic dysfunction, RV normal size/function and normal IVC. During patients initial visit with Dr. Umer Dixon in April 2019 a NTproBNP was checked due to conflicting physical exam findings and objective findings on echocardiogram, NTproBNP was normal. During September visit NTproBNP was again repeat  and again normal at 71. There was question if lower extremity edema was from other conditions than congestive heart failure. Dr. Keene recommended right heart catheterization to confirm heart failure.     Right heart catheterization 11/5/2019 demonstrated mildly elevated right sided filling pressures, normal PA pressures, normal left sided filling pressures and normal cardiac output (RA 13mmHg, PA 27mmHg, PCWP 13mmHg and cardiac output 2.81). With mild sedation and pt wide awake systemic hypoxia (O2 sat down  To 86-87%) was noted. Correlation with sleep study recommended. Dr. Keene reviewed right heart catheterization results, noting that patient does not have HFpEF, recommended sleep study.     Patient is here today for post catheterization follow up    Patient reports feeling good. Reports weight gain, states weight is up 10# on home scale, this is consistent with clinic scale. He notes this has been since his [facility]. Plans to take his metolazone tomorrow, notes he takes that on Wednesday and Saturday. Has noted worsening lower extremity edema with weight gain. Complaints of abdominal distention/bloating. Denies shortness of breath at rest. Some exertional dyspnea, which he notes mostly in the morning with activities. Notes he was able to walk into clinic with no difficulty. Able to complete ADLS and IADLs independently with no dyspnea. Felling fatigued. Sleeping good. Has been using CPAP consistently for past month. Patient denies chest pain or chest tightness. Denies dizziness, lightheadedness or other presyncopal symptoms. Denies tachycardia or palpitations.     Right groin no bruising, bleeding or tenderness.     Labs from 11/5/19 show stable kidney function and electrolytes, creatinine 0.88, hemoglobin 13.8. Blood pressure 150/66 and  in clinic today.    Appetite good. Eating most meals at home. Vegetarian. Monitoring sodium intake and also trying to limit carbohydrates. Drinking 40 ounces  diet soda, 40 ounces water. No exercise routine. Denies alcohol use. Denies tobacco use.         Recent Hospitalizations   None        Social History    Living independently in an apartment. 5 children. Moved back to [place] to be near children (3 live here) 8 grandchildren. Enjoys writing and astrology.   Social History     Socioeconomic History     Marital status:      Spouse name: Not on file     Number of children: Not on file     Years of education: Not on file     Highest education level: Not on file   Occupational History     Not on file   Social Needs     Financial resource strain: Not on file     Food insecurity:     Worry: Not on file     Inability: Not on file     Transportation needs:     Medical: Not on file     Non-medical: Not on file   Tobacco Use     Smoking status: Former Smoker     Packs/day: 0.50     Years: 25.00     Pack years: 12.50     Smokeless tobacco: Never Used     Tobacco comment: quit in 1989   Substance and Sexual Activity     Alcohol use: No     Frequency: Never     Comment: quit in 1989     Drug use: No     Sexual activity: Not Currently     Partners: Female   Lifestyle     Physical activity:     Days per week: Not on file     Minutes per session: Not on file     Stress: Not on file   Relationships     Social connections:     Talks on phone: Not on file     Gets together: Not on file     Attends Nondenominational service: Not on file     Active member of club or organization: Not on file     Attends meetings of clubs or organizations: Not on file     Relationship status: Not on file     Intimate partner violence:     Fear of current or ex partner: Not on file     Emotionally abused: Not on file     Physically abused: Not on file     Forced sexual activity: Not on file   Other Topics Concern     Parent/sibling w/ CABG, MI or angioplasty before 65F 55M? Not Asked   Social History Narrative     Not on file            Review of Systems:   Skin:  Positive for     Eyes:  Negative    ENT:   Negative    Respiratory:  Negative CPAP;sleep apnea  Cardiovascular:    Positive for;edema  Gastroenterology: Negative    Genitourinary:  Negative    Musculoskeletal:  Positive for arthritis(patient in PT for both shoulders)  Neurologic:  Negative    Psychiatric:  Negative    Heme/Lymph/Imm:  Negative    Endocrine:  Positive for thyroid disorder         Physical Exam:   Vitals: BP (!) 150/66   Pulse 101   Ht 1.829 m (6')   Wt 141.8 kg (312 lb 11.2 oz)   BMI 42.41 kg/m     Wt Readings from Last 4 Encounters:   11/12/19 141.8 kg (312 lb 11.2 oz)   11/12/19 141.1 kg (311 lb)   11/05/19 137.9 kg (304 lb 1.6 oz)   10/10/19 135.4 kg (298 lb 6.4 oz)     GEN: well nourished, in no acute distress.  HEENT:  Pupils equal, round. Sclerae nonicteric.   NECK: Supple, no masses appreciated. JVP appears normal on exam, body habitus makes exam difficult  C/V:  Regular rate and rhythm, no murmur, rub or gallop.   RESP: Respirations are unlabored. Clear to auscultation bilaterally without wheezing, rales, or rhonchi.  GI: Abdomen soft, nontender.  EXTREM: +3 LE edema.  NEURO: Alert and oriented, cooperative.  SKIN: Warm and dry.       Data:   ECHO 2/27/19 @ [facility]  Borderline LV enlargement, EF 66%  No regional wall motion abnormalities  Grade I diastolic dysfunction  RV normal size/function  Normal IVC     Right heart catheterization 11/5/19    Right sided filling pressures are mildly elevated.    Normal PA pressures.    Left sided filling pressures are normal.    Normal cardiac output level.     1.Generally normal PA and wedge pressure with mildly elevated RA filling pressure. Mild peripheral edema could correlate with the elevated CVP  2.Normal PVR,SVR. The diastolic filling gradient and the transpulmonary gradient are mildly elevated but there is essentially no pulmonary hypertension so significance of these findings is uncertain  3. With mild sedation and pt wide awake systemic hypoxia (O2 sat down  To 86-87%) was noted.  Correlation with sleep study recommended.       LIPID RESULTS:  Lab Results   Component Value Date    CHOL 127 02/27/2019    HDL 43 02/27/2019    LDL 54 02/27/2019    TRIG 151 (H) 02/27/2019     LIVER ENZYME RESULTS:  Lab Results   Component Value Date    AST 25 02/27/2019    ALT 20 03/05/2018     CBC RESULTS:  Lab Results   Component Value Date    WBC 10.8 11/05/2019    RBC 4.38 (L) 11/05/2019    HGB 13.8 11/05/2019    HCT 43.4 11/05/2019    MCV 99 11/05/2019    MCH 31.5 11/05/2019    MCHC 31.8 11/05/2019    RDW 16.3 (H) 11/05/2019     11/05/2019     BMP RESULTS:  Lab Results   Component Value Date     11/05/2019    POTASSIUM 4.1 11/05/2019    CHLORIDE 106 11/05/2019    CO2 27 11/05/2019    ANIONGAP 5 11/05/2019     (H) 11/05/2019    BUN 14 11/05/2019    CR 0.88 11/05/2019    GFRESTIMATED 81 11/05/2019    GFRESTBLACK >90 11/05/2019    LAWSON 8.6 11/05/2019      A1C RESULTS:  Lab Results   Component Value Date    A1C 5.8 (H) 02/27/2019     INR RESULTS:  Lab Results   Component Value Date    INR 1.9 (A) 11/12/2019    INR 1.10 11/05/2019    INR 2.6 (A) 10/07/2019    INR 1.6 02/27/2019            Medications     Current Outpatient Medications   Medication Sig Dispense Refill     Acetylcarnitine HCl (ACETYL L-CARNITINE) 500 MG CAPS Take 1 capsule by mouth 2 times daily       albuterol (PROAIR HFA/PROVENTIL HFA/VENTOLIN HFA) 108 (90 Base) MCG/ACT inhaler Inhale 2 puffs into the lungs every 4 hours as needed for shortness of breath / dyspnea or wheezing 1 Inhaler 0     aspirin (ASA) 81 MG chewable tablet Take 1 tablet by mouth daily       atorvastatin (LIPITOR) 40 MG tablet Take 1 tablet (40 mg) by mouth every evening 90 tablet 3     carvedilol (COREG) 12.5 MG tablet Take 12.5 mg by mouth 2 times daily       clonazePAM (KLONOPIN) 0.5 MG tablet TAKE 1 TABLET BY MOUTH TWICE A DAY AS NEEDED 60 tablet 0     co-enzyme Q-10 100 MG CAPS capsule Take 100 mg by mouth daily       diltiazem ER COATED BEADS (CARDIZEM  CD/CARTIA XT) 300 MG 24 hr capsule Take 1 capsule (300 mg) by mouth daily 90 capsule 0     furosemide (LASIX) 20 MG tablet TAKE 3 TABLETS BY MOUTH TWICE DAILY. 540 tablet 0     isosorbide mononitrate (IMDUR) 30 MG 24 hr tablet Take 1 tablet (30 mg) by mouth daily 90 tablet 3     levothyroxine (SYNTHROID/LEVOTHROID) 175 MCG tablet Take 1 tablet by mouth daily Additional 1/2 tablet on Sundays       lisinopril (PRINIVIL/ZESTRIL) 40 MG tablet Take 40 mg by mouth daily       metolazone (ZAROXOLYN) 2.5 MG tablet Take 1 tablet (2.5 mg) by mouth in the morning, Mon and Thur On Thursday 30 tablet 3     Multiple vitamin TABS Take 1 tablet by mouth daily       potassium chloride ER (K-TAB/KLOR-CON) 10 MEQ CR tablet Take 10 mEq by mouth 2 times daily       tamsulosin (FLOMAX) 0.4 MG capsule Take 1 capsule (0.4 mg) by mouth daily 90 capsule 3     Taurine 500 MG CAPS Take 500 mg by mouth 2 times daily       venlafaxine (EFFEXOR) 37.5 MG tablet Take 37.5 mg by mouth 2 times daily       warfarin ANTICOAGULANT (COUMADIN) 5 MG tablet TAKE 1-1.5 TABLETS (5-7.5 MG) BY MOUTH DAILY AS DIRECTED BY INR CLINIC 135 tablet 0          Past Medical History     Past Medical History:   Diagnosis Date     (HFpEF) heart failure with preserved ejection fraction (H)      BPH with urinary obstruction      CAD (coronary artery disease)     PCI w/ SUMMER in 2014     Cardiac pacemaker in situ      Chronic diastolic heart failure (H)      COPD (chronic obstructive pulmonary disease) (H)      DVT (deep venous thrombosis) (H)      Dyspnea      Essential hypertension      Fatigue      HLD (hyperlipidemia)      Hypothyroidism      Lymphedema      Obesity, Class III, BMI 40-49.9 (morbid obesity) (H)      RIN (obstructive sleep apnea)     has refused CPAP     Paroxysmal A-fib (H)      PE (pulmonary thromboembolism) (H)      Past Surgical History:   Procedure Laterality Date     cardiac stenting       CV RIGHT HEART CATH N/A 11/5/2019    Procedure: Right Heart  Cath;  Surgeon: Roberto Hernandez MD;  Location:  HEART CARDIAC CATH LAB     ENT SURGERY      tonsillectomy     IMPLANT PACEMAKER       ORTHOPEDIC SURGERY Right 2012    knee replacement     Family History   Problem Relation Age of Onset     Hypertension Father             Allergies   No known allergies        [facility]AM[facility] Baires [facility]  Pager: [phone]

## 2019-11-12 NOTE — PATIENT INSTRUCTIONS
Patient Education       Earwax, Home Treatment    Everyone produces earwax from the lining of the ear canal. It serves to lubricate and protect the ear. The wax that forms in the canal naturally moves toward the outside of the ear and falls out. Sometimes the ear canal may contain too much wax. This can cause a blockage and loss of hearing. Directions are given below for home treatment.  Home care  If your doctor has advised you to remove a wax blockage yourself, follow these directions:    Unless a medicine was prescribed, you may use an over-the-counter product made for clearing earwax. These contain carbamide peroxide. Lie down with the blocked ear facing upward. Apply one dropper full of medicine and wait a few minutes. Grasp the outer ear and wiggle it to help the solution enter the canal.    Lean over a sink or basin with the blocked ear facing downward. Use a bulb syringe filled with warm (not hot or cold) water to rinse the ear several times. Use gentle pressure only.    If you are having trouble draining the water out of your ear canal, put a few drops of rubbing alcohol (isopropyl alcohol) into the ear canal. This will help remove the remaining water.    Repeat this procedure once a day for up to three days, or until your hearing is back to normal. Do not use this treatment for more than three days in a row.  Don ts    Don t use cold water to rinse the ear. This will make you dizzy.    Don t perform this procedure if you have an ear infection.    Don t perform this procedure if you have a ruptured eardrum.    Don t use cotton swabs, matches, hairpins, keys, or other objects to  clean  the ear canal. This can cause infection of the ear canal or rupture the eardrum. Because of their size and shape, cotton swabs can push earwax deeper into the ear canal instead of removing it.  Follow-up care  Follow up with your health care provider if you are not improving after three cleaning attempts, or as  advised.  When to seek medical advice  Call your health care provider right away if any of these occur:    Worsening ear pain    Fever of 101 F (38.3 C) or higher, or as directed by your health care provider    Hearing does not return to normal after three days of treatment    Fluid drainage or bleeding from the ear canal    Swelling, redness, or tenderness of the outer ear    Headache, neck pain, or stiff neck    7080-1251 [employer]. [address] [place] [address], [place] [address]. All rights reserved. This information is not intended as a substitute for professional medical care. Always follow your healthcare professional's instructions.           Patient Education     Earwax Removal    The ear canal makes earwax from the canal s lining. The ears make wax to lubricate and protect the ear canal. The ear canal is the tube that connects the middle ear to the outside of the ear. The wax protects the ear from bacteria, infection, and damage from water or trauma.  The wax that forms in the canal naturally moves toward the outside of the ear and falls out. In some cases, the ear may make too much wax. If the wax causes problems or keeps the healthcare provider from seeing into the ear, the extra wax may be removed.  Too much wax can affect your hearing. It can cause itching. In rare cases, it can be painful. Earwax should not be removed unless it is causing a problem. You should not stick objects into your ear to remove wax unless told to do so by your healthcare provider.  Healthcare providers can remove earwax safely. It is important to stay still during the procedure to avoid damage to the ear canal. But removing earwax generally doesn t hurt. You will not usually need anesthesia or pain medicine when the provider removes the earwax.  A number of conditions lead to earwax buildup. These include some skin problems, a narrow ear canal, or ears that make too much earwax. Using cotton swabs in the canal pushes  earwax deeper into the ear and contributes to the buildup of earwax.  Home care    The healthcare provider may recommend mineral oil or an over-the-counter eardrop to use at home to soften the earwax. Use these products only if the provider recommends them. Carefully follow the instructions given.    Don t use mineral oil or OTC eardrops if you might have an ear infection or a ruptured eardrum. Tell your healthcare provider right away if you have diabetes or an immune disorder.    Don t use cotton swabs in your ears. Cotton swabs may push wax deeper into the ear canal or damage the eardrum. Use cotton gauze or a wet washcloth  to gently remove wax on the outside of the ear and around the opening to the ear canal.    Don't use any probing device or object such as cotton-tipped swabs or jazmin pins to clean the inside of your ears.    Don t use ear candles to clean your ears. Candling can be dangerous. It can burn the ear canal. It can also make the condition worse instead of better.    Don t use cold water to rinse the ear. This will make you dizzy. If your provider tells you to rinse your ear, use only warm water or follow his or her instructions.    Check the ear for signs of infection or irritation listed below under When to seek medical advice.  Steps for using eardrops  1. Warm the medicine bottle by rubbing it between your hands for a few minutes.  2. Lie down on your side, with the affected ear up.  3. Place the recommended number of drops in the ear. Wet a cotton ball with the medicine. Gently put the cotton ball into the ear opening.  Follow-up care  Follow up with your healthcare provider, or as directed.  When to seek medical advice  Call the provider right away if you have:    Ear pain that gets worse    Fever of 100.4F F (38 C) or higher, or as directed by your healthcare provider    Worsening wax buildup    Severe pain, dizziness, or nausea    Bleeding from the ear    Hearing problems    Signs of  irritation from the eardrops, such as burning, stinging, or swelling and tenderness    Foul-smelling fluid draining from the ear    Swelling, redness, or tenderness of the outer ear    Headache, neck pain, or stiff neck  Date Last Reviewed: 6/1/2017 2000-2018 [employer]. [address] [place] [address]. All rights reserved. This information is not intended as a substitute for professional medical care. Always follow your healthcare professional's instructions.           Patient Education     Impacted Earwax     Inner ear structures including ear canal and eardrum.     Impacted earwax is a buildup of the natural wax in the ear (cerumen). Impacted earwax is very common. It can cause symptoms such as hearing loss. It can also make it difficult for a doctor to examine your ear.  Understanding earwax  Tiny glands in your ear make substances that combine with dead skin cells to form earwax. Earwax helps protect your ear canal from water, dirt, infection, and injury. Over time, earwax travels from the inner part of your ear canal to the entrance of the canal. Then it falls away naturally. But in some cases, it can t travel to the entrance of the canal. This may be because of a health condition or objects put in the ear. With age, earwax tends to become harder and less fluid. Older adults are more likely to have problems with earwax buildup.  What causes impacted earwax?  Earwax can build up because of many health conditions. Some cause a physical blockage. Others cause too much earwax to be made. Health conditions that can cause earwax buildup include:    Use of cotton swabs to clean deep in the ear canal    Bony blockage in the ear (osteoma or exostoses)    Infections, such as  infection of the outer ear (external otitis)    Skin disease, such as eczema    Autoimmune diseases, such as lupus    A narrowed ear canal from birth, chronic inflammation, or injury    Too much earwax because of  injury    Too much earwax because of  water in the ear canal  Objects repeatedly placed in the ear can also cause impacted earwax. For example, putting cotton swabs in the ear may push the wax deeper into the ear. Over time, this may cause blockage. Hearing aids, swimming plugs, and swim molds can cause the same problem when used again and again.  In some cases, the cause of impacted earwax is not known.  Symptoms of impacted earwax  Excess earwax usually does not cause any symptoms, unless there is a large amount of buildup. Then it may cause symptoms such as:    Hearing loss    Earache    Sense of ear fullness    Itching in the ear    Odor from the ear    Ear drainage    Dizziness    Ringing in the ears    Cough  Treatment for impacted earwax  If you don t have symptoms, you may not need treatment. Often, the earwax goes away on its own with time. If you have symptoms, you may have one or more treatments such as:    Eardrops to soften the earwax. This helps it leave the ear over time.    Rinsing (irrigation) of the ear canal with water. This is done in a doctor s office.    Removal of the earwax with small tools. This is also done in a doctor s office.  In rare cases, some treatments for earwax removal may cause complications such as:    Infection of the outer ear (otitis external)    Earache    Short-term hearing loss    Dizziness    Water trapped in the ear canal    Hole in the eardrum    Ringing in the ears    Bleeding from the ear  Talk with your healthcare provider about which risks apply most to you.  Don t use these at home  Healthcare providers do not advise use of ear candles or ear vacuum kits. These methods are not shown to work and may cause problems.   Preventing impacted earwax  You may not be able to prevent impacted earwax if you have a health condition that causes it, such as eczema. In other cases, you may be able to prevent earwax buildup by:    Using ear drops once a week    Having routine  cleaning of the ear about every 6 months    Not using cotton swabs in the ear  When to call the healthcare provider  Call your healthcare provider if you have symptoms of impacted earwax. Also call right away if you have severe symptoms after earwax removal. These may include bleeding or severe ear pain.   Date Last Reviewed: 5/1/2017 2000-2018 [employer]. [address] [place] [address]. All rights reserved. This information is not intended as a substitute for professional medical care. Always follow your healthcare professional's instructions.         please purchase some debrox and use according to packaging instructions  Return to clinic in a week for another attempt to remove the wax

## 2019-11-12 NOTE — LETTER
11/12/2019    Dontrell Gómez MD  [address]  [place] [address]    RE: Erich Craven       Dear Colleague,    I had the pleasure of seeing Erich Craven in the [facility].    Cardiology Clinic Progress Note  Erich Craven MRN# [id number]   YOB: 1940 Age: 79 year old   Primary Cardiologist: Dr. Keene Reason for visit: Post angiogram follow up             Assessment and Plan:   Erich Craven is a very pleasant 79 year old male with a history of coronary artery disease, paroxsymal atrial fibrillation, s/p PPM for conduction system disease, hypertension, hyperlipidemia, RIN and morbid obesity. Patient here today for follow up after right heart catheterization.    1. Coronary artery disease with history of stenting of his proximal LAD and mid-proximal RCA in 2014. Stable, no signs/symptoms of ischemia.    - Continue aspirin, statin and betablocker therapy   - WEL program referral given today   - Counseled patient on lifestyle modifications    2. Near normal pulmonary pressures and normal wedge on RHC 11/5/19 RA 13mmHg, PA 27mmHg, PCWP 13mmHg and cardiac output 2.81. Unknown weight at time of RHC.     3. Chronic lower extremity edema - not secondary to heart failure. Right heart catheterization completed 11/5/2019 as noted above showed near normal pulmonary pressures and normal wedge pressure. Patient reports 10# weight gain since RHC, which is consistent with weight on clinic scale today with complaints of worsening lower extremity edema (+3 on exam).    - Lymphedema referral given today    - Continue current diuretic regimen (furosemide and metolazone), plans for metolazone Wednesday and Sunday.   - Extensively reviewed low sodium diet and 2L fluid restriction    4. Paroxsymal atrial fibrillation - stable, rate controlled, asymptomatic. Device interrogation today showed 9 mode switches, compromising < 1% of the time. 3 EGMs available show atrial  arrhythmia lasting 6-17 seconds, ventricular rates 85-180bpm.   - Continue diltiazem and carvedilol     5. S/p PPM for conduction system disease - stable, continue routine device checks.     6. Hypertension - elevated in clinic today, typically well controlled, will continue to monitor, if elevated at follow up appointment will titrate hypertension medications.    - Continue carvedilol, diltiazem, isosorbide mononitrate, lisinopril and diuretics (furosemide and metolazone)   - Counseled patient on lifestyle modifications to help manage BP    7. Hyperlipidemia - lipid panel 2/27/19 demonstrated total cholesterol 127, HDL 43, LDL 54 and triglycerides 151   - continue atorvastatin    8. Obstructive sleep apnea - complaint with CPAP for past month, counseled patient on the importance of compliance with CPAP.     9. Morbid obesity - BMP 42.41, counseled patient on weight loss strategies, reviewed techniques to help manage portion size.    - WEL program referral, patient interested developing exercise routine, patient interested in WEL program.         Changes today: none    Follow up plan:     WEL program referral     Lymphedema referral     Cardiology follow up in 1 month with labs prior     Follow up with Dr. Portillo in April 2019        History of Presenting Illness:    Erich Craven is a very pleasant 79 year old male with a history of coronary artery disease, paroxsymal atrial fibrillation, s/p PPM for conduction system disease, hypertension, hyperlipidemia, RIN and morbid obesity.     Patient moved from [facility] this past year to be closer to his family. He established cardiology care with Dr. Portillo. Patient has history of stenting of his proximal LAD and mid-proximal RCA in 2014. Echocardiogram completed in February 2019 at [place] showed borderline LV enlargement, EF 66%, no regional wall motion abnormalities, grade I diastolic dysfunction, RV normal size/function and normal IVC. During patients  initial visit with Dr. Umer Dixon in April 2019 a NTproBNP was checked due to conflicting physical exam findings and objective findings on echocardiogram, NTproBNP was normal. During September visit NTproBNP was again repeat and again normal at 71. There was question if lower extremity edema was from other conditions than congestive heart failure. Dr. Keene recommended right heart catheterization to confirm heart failure.     Right heart catheterization 11/5/2019 demonstrated mildly elevated right sided filling pressures, normal PA pressures, normal left sided filling pressures and normal cardiac output (RA 13mmHg, PA 27mmHg, PCWP 13mmHg and cardiac output 2.81). With mild sedation and pt wide awake systemic hypoxia (O2 sat down  To 86-87%) was noted. Correlation with sleep study recommended. Dr. Keene reviewed right heart catheterization results, noting that patient does not have HFpEF, recommended sleep study.     Patient is here today for post catheterization follow up    Patient reports feeling good. Reports weight gain, states weight is up 10# on home scale, this is consistent with clinic scale. He notes this has been since his [facility]. Plans to take his metolazone tomorrow, notes he takes that on Wednesday and Saturday. Has noted worsening lower extremity edema with weight gain. Complaints of abdominal distention/bloating. Denies shortness of breath at rest. Some exertional dyspnea, which he notes mostly in the morning with activities. Notes he was able to walk into clinic with no difficulty. Able to complete ADLS and IADLs independently with no dyspnea. Felling fatigued. Sleeping good. Has been using CPAP consistently for past month. Patient denies chest pain or chest tightness. Denies dizziness, lightheadedness or other presyncopal symptoms. Denies tachycardia or palpitations.     Right groin no bruising, bleeding or tenderness.     Labs from 11/5/19 show stable kidney function and electrolytes,  creatinine 0.88, hemoglobin 13.8. Blood pressure 150/66 and  in clinic today.    Appetite good. Eating most meals at home. Vegetarian. Monitoring sodium intake and also trying to limit carbohydrates. Drinking 40 ounces diet soda, 40 ounces water. No exercise routine. Denies alcohol use. Denies tobacco use.         Recent Hospitalizations   None        Social History    Living independently in an apartment. 5 children. Moved back to [place] to be near children (3 live here) 8 grandchildren. Enjoys writing and astrology.   Social History     Socioeconomic History     Marital status:      Spouse name: Not on file     Number of children: Not on file     Years of education: Not on file     Highest education level: Not on file   Occupational History     Not on file   Social Needs     Financial resource strain: Not on file     Food insecurity:     Worry: Not on file     Inability: Not on file     Transportation needs:     Medical: Not on file     Non-medical: Not on file   Tobacco Use     Smoking status: Former Smoker     Packs/day: 0.50     Years: 25.00     Pack years: 12.50     Smokeless tobacco: Never Used     Tobacco comment: quit in 1989   Substance and Sexual Activity     Alcohol use: No     Frequency: Never     Comment: quit in 1989     Drug use: No     Sexual activity: Not Currently     Partners: Female   Lifestyle     Physical activity:     Days per week: Not on file     Minutes per session: Not on file     Stress: Not on file   Relationships     Social connections:     Talks on phone: Not on file     Gets together: Not on file     Attends Sabianist service: Not on file     Active member of club or organization: Not on file     Attends meetings of clubs or organizations: Not on file     Relationship status: Not on file     Intimate partner violence:     Fear of current or ex partner: Not on file     Emotionally abused: Not on file     Physically abused: Not on file     Forced sexual activity: Not on file    Other Topics Concern     Parent/sibling w/ CABG, MI or angioplasty before 65F 55M? Not Asked   Social History Narrative     Not on file            Review of Systems:   Skin:  Positive for     Eyes:  Negative    ENT:  Negative    Respiratory:  Negative CPAP;sleep apnea  Cardiovascular:    Positive for;edema  Gastroenterology: Negative    Genitourinary:  Negative    Musculoskeletal:  Positive for arthritis(patient in PT for both shoulders)  Neurologic:  Negative    Psychiatric:  Negative    Heme/Lymph/Imm:  Negative    Endocrine:  Positive for thyroid disorder         Physical Exam:   Vitals: BP (!) 150/66   Pulse 101   Ht 1.829 m (6')   Wt 141.8 kg (312 lb 11.2 oz)   BMI 42.41 kg/m      Wt Readings from Last 4 Encounters:   11/12/19 141.8 kg (312 lb 11.2 oz)   11/12/19 141.1 kg (311 lb)   11/05/19 137.9 kg (304 lb 1.6 oz)   10/10/19 135.4 kg (298 lb 6.4 oz)     GEN: well nourished, in no acute distress.  HEENT:  Pupils equal, round. Sclerae nonicteric.   NECK: Supple, no masses appreciated. JVP appears normal on exam, body habitus makes exam difficult  C/V:  Regular rate and rhythm, no murmur, rub or gallop.   RESP: Respirations are unlabored. Clear to auscultation bilaterally without wheezing, rales, or rhonchi.  GI: Abdomen soft, nontender.  EXTREM: +3 LE edema.  NEURO: Alert and oriented, cooperative.  SKIN: Warm and dry.       Data:   ECHO 2/27/19 @ [facility]  Borderline LV enlargement, EF 66%  No regional wall motion abnormalities  Grade I diastolic dysfunction  RV normal size/function  Normal IVC     Right heart catheterization 11/5/19    Right sided filling pressures are mildly elevated.    Normal PA pressures.    Left sided filling pressures are normal.    Normal cardiac output level.     1.Generally normal PA and wedge pressure with mildly elevated RA filling pressure. Mild peripheral edema could correlate with the elevated CVP  2.Normal PVR,SVR. The diastolic filling gradient and the transpulmonary  gradient are mildly elevated but there is essentially no pulmonary hypertension so significance of these findings is uncertain  3. With mild sedation and pt wide awake systemic hypoxia (O2 sat down  To 86-87%) was noted. Correlation with sleep study recommended.       LIPID RESULTS:  Lab Results   Component Value Date    CHOL 127 02/27/2019    HDL 43 02/27/2019    LDL 54 02/27/2019    TRIG 151 (H) 02/27/2019     LIVER ENZYME RESULTS:  Lab Results   Component Value Date    AST 25 02/27/2019    ALT 20 03/05/2018     CBC RESULTS:  Lab Results   Component Value Date    WBC 10.8 11/05/2019    RBC 4.38 (L) 11/05/2019    HGB 13.8 11/05/2019    HCT 43.4 11/05/2019    MCV 99 11/05/2019    MCH 31.5 11/05/2019    MCHC 31.8 11/05/2019    RDW 16.3 (H) 11/05/2019     11/05/2019     BMP RESULTS:  Lab Results   Component Value Date     11/05/2019    POTASSIUM 4.1 11/05/2019    CHLORIDE 106 11/05/2019    CO2 27 11/05/2019    ANIONGAP 5 11/05/2019     (H) 11/05/2019    BUN 14 11/05/2019    CR 0.88 11/05/2019    GFRESTIMATED 81 11/05/2019    GFRESTBLACK >90 11/05/2019    LAWSON 8.6 11/05/2019      A1C RESULTS:  Lab Results   Component Value Date    A1C 5.8 (H) 02/27/2019     INR RESULTS:  Lab Results   Component Value Date    INR 1.9 (A) 11/12/2019    INR 1.10 11/05/2019    INR 2.6 (A) 10/07/2019    INR 1.6 02/27/2019            Medications     Current Outpatient Medications   Medication Sig Dispense Refill     Acetylcarnitine HCl (ACETYL L-CARNITINE) 500 MG CAPS Take 1 capsule by mouth 2 times daily       albuterol (PROAIR HFA/PROVENTIL HFA/VENTOLIN HFA) 108 (90 Base) MCG/ACT inhaler Inhale 2 puffs into the lungs every 4 hours as needed for shortness of breath / dyspnea or wheezing 1 Inhaler 0     aspirin (ASA) 81 MG chewable tablet Take 1 tablet by mouth daily       atorvastatin (LIPITOR) 40 MG tablet Take 1 tablet (40 mg) by mouth every evening 90 tablet 3     carvedilol (COREG) 12.5 MG tablet Take 12.5 mg by mouth  2 times daily       clonazePAM (KLONOPIN) 0.5 MG tablet TAKE 1 TABLET BY MOUTH TWICE A DAY AS NEEDED 60 tablet 0     co-enzyme Q-10 100 MG CAPS capsule Take 100 mg by mouth daily       diltiazem ER COATED BEADS (CARDIZEM CD/CARTIA XT) 300 MG 24 hr capsule Take 1 capsule (300 mg) by mouth daily 90 capsule 0     furosemide (LASIX) 20 MG tablet TAKE 3 TABLETS BY MOUTH TWICE DAILY. 540 tablet 0     isosorbide mononitrate (IMDUR) 30 MG 24 hr tablet Take 1 tablet (30 mg) by mouth daily 90 tablet 3     levothyroxine (SYNTHROID/LEVOTHROID) 175 MCG tablet Take 1 tablet by mouth daily Additional 1/2 tablet on Sundays       lisinopril (PRINIVIL/ZESTRIL) 40 MG tablet Take 40 mg by mouth daily       metolazone (ZAROXOLYN) 2.5 MG tablet Take 1 tablet (2.5 mg) by mouth in the morning, Mon and Thur On Thursday 30 tablet 3     Multiple vitamin TABS Take 1 tablet by mouth daily       potassium chloride ER (K-TAB/KLOR-CON) 10 MEQ CR tablet Take 10 mEq by mouth 2 times daily       tamsulosin (FLOMAX) 0.4 MG capsule Take 1 capsule (0.4 mg) by mouth daily 90 capsule 3     Taurine 500 MG CAPS Take 500 mg by mouth 2 times daily       venlafaxine (EFFEXOR) 37.5 MG tablet Take 37.5 mg by mouth 2 times daily       warfarin ANTICOAGULANT (COUMADIN) 5 MG tablet TAKE 1-1.5 TABLETS (5-7.5 MG) BY MOUTH DAILY AS DIRECTED BY INR CLINIC 135 tablet 0          Past Medical History     Past Medical History:   Diagnosis Date     (HFpEF) heart failure with preserved ejection fraction (H)      BPH with urinary obstruction      CAD (coronary artery disease)     PCI w/ SUMMER in 2014     Cardiac pacemaker in situ      Chronic diastolic heart failure (H)      COPD (chronic obstructive pulmonary disease) (H)      DVT (deep venous thrombosis) (H)      Dyspnea      Essential hypertension      Fatigue      HLD (hyperlipidemia)      Hypothyroidism      Lymphedema      Obesity, Class III, BMI 40-49.9 (morbid obesity) (H)      RIN (obstructive sleep apnea)     has  refused CPAP     Paroxysmal A-fib (H)      PE (pulmonary thromboembolism) (H)      Past Surgical History:   Procedure Laterality Date     cardiac stenting       CV RIGHT HEART CATH N/A 11/5/2019    Procedure: Right Heart Cath;  Surgeon: Roberto Hernandez MD;  Location:  HEART CARDIAC CATH LAB     ENT SURGERY      tonsillectomy     IMPLANT PACEMAKER       ORTHOPEDIC SURGERY Right 2012    knee replacement     Family History   Problem Relation Age of Onset     Hypertension Father             Allergies   No known allergies        VAMSI Baires CNP  [facility]  Pager: [phone]      Thank you for allowing me to participate in the care of your patient.    Sincerely,     VAMSI Baires CNP     [facility]

## 2019-11-12 NOTE — TELEPHONE ENCOUNTER
furosemide (LASIX) 20 MG tablet 540 tablet 0 5/6/2019     Last Written Prescription Date:  5/6/19  Last Fill Quantity: 540,  # refills: 0   Last office visit: No previous visit found with prescribing provider:  Dayami   Future Office Visit:   Next 5 appointments (look out 90 days)    Nov 12, 2019  2:30 PM CST  Return Visit with [facility] ([facility]) [address]  [place] [id number] [phone] OPT 2   Nov 18, 2019  2:30 PM CST  Office [facility]rell [facility] [address] [id number] [phone]   Dec 11, 2019  9:00 AM CST  Office Visit with Dontrell óGmez MD  [facility] [address] [id number] [phone]         potassium chloride ER (K-TAB/KLOR-CON) 10 MEQ CR tablet   11/28/2018     Last Written Prescription Date:  11/28/18  Last Fill Quantity: ?,  # refills: ?   Last office visit: No previous visit found with prescribing provider:  Dalia   Future Office Visit:   Next 5 appointments (look out 90 days)    Nov 12, 2019  2:30 PM CST  Return Visit with [facility] ([facility]) [address]  [place] [id number] [phone] OPT 2   Nov 18, 2019  2:30 PM CST  Office Visit with Dontrell Gómez MD  [facility] [address] [id number] [phone]   Dec 11, 2019  9:00 AM CST  Office Visit with Dontrell Gómez MD  [facility] [address] [id number] [phone]         Requested Prescriptions   Pending Prescriptions Disp Refills     furosemide (LASIX) 20 MG tablet 540 tablet 0     Sig: TAKE 3 TABLETS BY MOUTH TWICE DAILY.       There is no refill protocol information for this order         potassium chloride ER (K-TAB/KLOR-CON) 10 MEQ CR tablet 90 tablet 3     Sig: Take 1 tablet (10 mEq) by mouth 2 times daily       There is no refill protocol information for this order        [facility] Follow-up to PHQ 5/21/2019   PHQ-9 9. Suicide Ideation past 2 weeks Not at all

## 2019-11-12 NOTE — PATIENT INSTRUCTIONS
1. No medication changes today.   2. Referral to lymphedema clinic  3. Referral to cardiac rehab for WEL program  4. Followup with Destiny in 1month with labs  5. Continue to monitor weight, limit sodium intake and fluid intake.   6. Please call with any additional questions/concerns [phone]

## 2019-11-12 NOTE — PROGRESS NOTES
Patient identified using two patient identifiers.  Ear exam showing wax occlusion completed by provider.  Solution: warm water and hydrogen pro was placed in the left ear(s) via irrigation tool: elephant ear.  Keaton Jaime CMA on 11/12/2019 at 10:40 AM

## 2019-11-12 NOTE — PROGRESS NOTES
ANTICOAGULATION FOLLOW-UP CLINIC VISIT    Patient Name:  Erich Craven  Date:  2019  Contact Type:  Face to Face    SUBJECTIVE:  Patient Findings     Positives:   Hospital admission    Comments:   Pt was recently on a warfarin HOLD for a procedure.        Clinical Outcomes     Comments:   Pt was recently on a warfarin HOLD for a procedure.           OBJECTIVE    INR Protime   Date Value Ref Range Status   2019 1.9 (A) 0.86 - 1.14 Final       ASSESSMENT / PLAN  INR assessment SUB    Recheck INR In: 1 WEEK    INR Location Clinic      Anticoagulation Summary  As of 2019    INR goal:   2.0-3.0   TTR:   77.8 % (7.1 mo)   INR used for dosin.9! (2019)   Warfarin maintenance plan:   7.5 mg (5 mg x 1.5) every Mon, Wed, Fri; 5 mg (5 mg x 1) all other days   Full warfarin instructions:   : 7.5 mg; Otherwise 7.5 mg every Mon, Wed, Fri; 5 mg all other days   Weekly warfarin total:   42.5 mg   Plan last modified:   Lanny Spivey RN (2019)   Next INR check:   2019   Target end date:            Anticoagulation Episode Summary     INR check location:       Preferred lab:       Send INR reminders to:   SERENITY BECERRA    Comments:         Anticoagulation Care Providers     Provider Role Specialty Phone number    Dontrell Gómez MD Referring Internal Medicine [phone]            See the Encounter Report to view Anticoagulation Flowsheet and Dosing Calendar (Go to Encounters tab in chart review, and find the Anticoagulation Therapy Visit)    Pt INR is 1.9 after a recent HOLD. Pt states that he already took 5 mg today. Pt advised to take another 1/2 tablet to equal 7.5 mg for today 19 then continue taking 7.5 mg on , ,  and 5 mg all the other days. Pt scheduled today to see Nany Roque for an ear evaluation and possible ear wash. Pt scheduled for hospital follow up with  on 19 at 2:30 pm. Pt will recheck INR on 19 at 2:15 pm. Jaun  aware if signs of clotting (pain, tenderness, swelling, color change in leg or arm, SOB) and bleeding occur (blood in stool, urine, large bruising, bleeding gums, nosebleeds) to have INR check sooner. If sx severe report to ER or concerned for stroke call 911. If general questions or concerns arise, call clinic.         Delia Jang RN

## 2019-11-13 RX ORDER — FUROSEMIDE 20 MG
TABLET ORAL
Qty: 540 TABLET | Refills: 3 | Status: SHIPPED | OUTPATIENT
Start: 2019-11-13 | End: 2020-11-03

## 2019-11-13 RX ORDER — POTASSIUM CHLORIDE 750 MG/1
10 TABLET, EXTENDED RELEASE ORAL 2 TIMES DAILY
Qty: 90 TABLET | Refills: 3 | Status: SHIPPED | OUTPATIENT
Start: 2019-11-13 | End: 2020-01-24

## 2019-11-13 NOTE — TELEPHONE ENCOUNTER
RPh calling staying he is out of these medications  Asking if we could expatiate this     Thank you

## 2019-11-14 PROBLEM — M25.562 LEFT KNEE PAIN: Status: RESOLVED | Noted: 2019-07-23 | Resolved: 2019-11-14

## 2019-11-14 NOTE — PROGRESS NOTES
Discharge Note    Progress reporting period is from last soap note on   to Aug 15, 2019.    Erich failed to follow up and current status is unknown.  Please see information below for last relevant information on current status.  Patient seen for 4 visits.    SUBJECTIVE  Subjective changes noted by patient:  pt reports he feels he is at baseline since exacerbation of knee pain bringing him to therapy.  Pt able to walk with less pain.  He does complain of feeling tired and fatigue  .  Current pain level is 0/10.     Previous pain level was  8/10.   Changes in function:  Yes (See Goal flowsheet attached for changes in current functional level)  Adverse reaction to treatment or activity: None    OBJECTIVE  Changes noted in objective findings: tolerated increased reps of HEP.  Pt fatigues easily  Improved gait with TKE     ASSESSMENT/PLAN  Diagnosis: L knee pain   Updated problem list and treatment plan:   Pain - HEP  Decreased ROM/flexibility - HEP  Decreased function - HEP  Decreased strength - HEP  STG/LTGs have been met or progress has been made towards goals:  Yes, please see goal flowsheet for most current information  Assessment of Progress: current status is unknown.    Last current status: Pt is progressing as expected   Self Management Plans:  HEP  I have re-evaluated this patient and find that the nature, scope, duration and intensity of the therapy is appropriate for the medical condition of the patient.  Erich continues to require the following intervention to meet STG and LTG's:  HEP.    Recommendations:  Discharge with current home program.  Patient to follow up with MD as needed.    Please refer to the daily flowsheet for treatment today, total treatment time and time spent performing 1:1 timed codes.

## 2019-11-15 DIAGNOSIS — J44.9 STAGE 2 MODERATE COPD BY GOLD CLASSIFICATION (H): Primary | ICD-10-CM

## 2019-11-18 ENCOUNTER — OFFICE VISIT (OUTPATIENT)
Dept: FAMILY MEDICINE | Facility: CLINIC | Age: 79
End: 2019-11-18
Payer: MEDICARE

## 2019-11-18 ENCOUNTER — ANTICOAGULATION THERAPY VISIT (OUTPATIENT)
Dept: NURSING | Facility: CLINIC | Age: 79
End: 2019-11-18
Payer: MEDICARE

## 2019-11-18 VITALS
HEART RATE: 85 BPM | TEMPERATURE: 97 F | DIASTOLIC BLOOD PRESSURE: 75 MMHG | WEIGHT: 304 LBS | OXYGEN SATURATION: 96 % | BODY MASS INDEX: 41.17 KG/M2 | SYSTOLIC BLOOD PRESSURE: 148 MMHG | HEIGHT: 72 IN

## 2019-11-18 DIAGNOSIS — I82.4Y9 DEEP VEIN THROMBOSIS (DVT) OF PROXIMAL LOWER EXTREMITY, UNSPECIFIED CHRONICITY, UNSPECIFIED LATERALITY (H): ICD-10-CM

## 2019-11-18 DIAGNOSIS — I10 ESSENTIAL HYPERTENSION: ICD-10-CM

## 2019-11-18 DIAGNOSIS — G47.33 OSA (OBSTRUCTIVE SLEEP APNEA): ICD-10-CM

## 2019-11-18 DIAGNOSIS — E66.01 MORBID OBESITY (H): ICD-10-CM

## 2019-11-18 DIAGNOSIS — M17.12 PRIMARY OSTEOARTHRITIS OF LEFT KNEE: ICD-10-CM

## 2019-11-18 DIAGNOSIS — F43.21 ADJUSTMENT DISORDER WITH DEPRESSED MOOD: ICD-10-CM

## 2019-11-18 DIAGNOSIS — J44.9 CHRONIC OBSTRUCTIVE PULMONARY DISEASE, UNSPECIFIED COPD TYPE (H): ICD-10-CM

## 2019-11-18 DIAGNOSIS — E03.9 HYPOTHYROIDISM, UNSPECIFIED TYPE: ICD-10-CM

## 2019-11-18 DIAGNOSIS — I48.0 PAROXYSMAL ATRIAL FIBRILLATION (H): ICD-10-CM

## 2019-11-18 DIAGNOSIS — I26.99 PE (PULMONARY THROMBOEMBOLISM) (H): Primary | ICD-10-CM

## 2019-11-18 LAB
ERYTHROCYTE [DISTWIDTH] IN BLOOD BY AUTOMATED COUNT: 16.1 % (ref 10–15)
HCT VFR BLD AUTO: 42.5 % (ref 40–53)
HGB BLD-MCNC: 13.7 G/DL (ref 13.3–17.7)
INR POINT OF CARE: 2.4 (ref 0.86–1.14)
MCH RBC QN AUTO: 32.3 PG (ref 26.5–33)
MCHC RBC AUTO-ENTMCNC: 32.2 G/DL (ref 31.5–36.5)
MCV RBC AUTO: 100 FL (ref 78–100)
PLATELET # BLD AUTO: 181 10E9/L (ref 150–450)
RBC # BLD AUTO: 4.24 10E12/L (ref 4.4–5.9)
WBC # BLD AUTO: 10.2 10E9/L (ref 4–11)

## 2019-11-18 PROCEDURE — 84443 ASSAY THYROID STIM HORMONE: CPT | Performed by: INTERNAL MEDICINE

## 2019-11-18 PROCEDURE — 80048 BASIC METABOLIC PNL TOTAL CA: CPT | Performed by: INTERNAL MEDICINE

## 2019-11-18 PROCEDURE — 99207 ZZC NO CHARGE NURSE ONLY: CPT

## 2019-11-18 PROCEDURE — 99214 OFFICE O/P EST MOD 30 MIN: CPT | Performed by: INTERNAL MEDICINE

## 2019-11-18 PROCEDURE — 85610 PROTHROMBIN TIME: CPT | Mod: QW

## 2019-11-18 PROCEDURE — 85027 COMPLETE CBC AUTOMATED: CPT | Performed by: INTERNAL MEDICINE

## 2019-11-18 PROCEDURE — 36416 COLLJ CAPILLARY BLOOD SPEC: CPT

## 2019-11-18 ASSESSMENT — MIFFLIN-ST. JEOR: SCORE: 2126.93

## 2019-11-18 NOTE — PROGRESS NOTES
ANTICOAGULATION FOLLOW-UP CLINIC VISIT    Patient Name:  Erich Craven  Date:  2019  Contact Type:  Face to Face    SUBJECTIVE:  Patient Findings     Comments:   The patient was assessed for diet, medication, and activity level changes, missed or extra doses, bruising or bleeding, with no problem findings.        Clinical Outcomes     Negatives:   Major bleeding event, Thromboembolic event, Anticoagulation-related hospital admission, Anticoagulation-related ED visit, Anticoagulation-related fatality    Comments:   The patient was assessed for diet, medication, and activity level changes, missed or extra doses, bruising or bleeding, with no problem findings.           OBJECTIVE    INR Protime   Date Value Ref Range Status   2019 2.4 (A) 0.86 - 1.14 Final       ASSESSMENT / PLAN  INR assessment THER    Recheck INR In: 4 WEEKS    INR Location Clinic      Anticoagulation Summary  As of 2019    INR goal:   2.0-3.0   TTR:   77.8 % (7.3 mo)   INR used for dosin.4 (2019)   Warfarin maintenance plan:   7.5 mg (5 mg x 1.5) every Mon, Wed, Fri; 5 mg (5 mg x 1) all other days   Full warfarin instructions:   7.5 mg every Mon, Wed, Fri; 5 mg all other days   Weekly warfarin total:   42.5 mg   Plan last modified:   Lanny Spivey RN (2019)   Next INR check:      Target end date:            Anticoagulation Episode Summary     INR check location:       Preferred lab:       Send INR reminders to:   SERENITY BECERRA    Comments:         Anticoagulation Care Providers     Provider Role Specialty Phone number    Dontrell Gómez MD Referring Internal Medicine [phone]            See the Encounter Report to view Anticoagulation Flowsheet and Dosing Calendar (Go to Encounters tab in chart review, and find the Anticoagulation Therapy Visit)    Pt INR is 2.4 today. Pt advised to continue taking 7.5 mg on , ,  and 5 mg all the other days. Recheck INR in 4 weeks or sooner as needed.  Pt reports that he is going on a trip and will not be back until 12/15/19. Pt seeing PCP today after [facility] visit. Jaun aware if signs of clotting (pain, tenderness, swelling, color change in leg or arm, SOB) and bleeding occur (blood in stool, urine, large bruising, bleeding gums, nosebleeds) to have INR check sooner. If sx severe report to ER or concerned for stroke call 911. If general questions or concerns arise, call clinic.         Delia Jang RN

## 2019-11-18 NOTE — PROGRESS NOTES
Subjective   Hospital follow-up  November 5, 2019  Right heart cath    Erich Craven is a 79 year old male who presents to clinic today for the following health issues:    HPI   The pt presents to the clinic for a f/u exam. The pt reports feeling in good health. He is pleased with his recent right heart cath on 11/05/2019. The pt notes that his breathing is improving.     He is using his CPAP each night which has helped improve his sleep.     Pt requests a letter for a service dog.     Patient Active Problem List   Diagnosis     Morbid obesity (H)     PE (pulmonary thromboembolism) (H)     HTN (hypertension)     Chronic diastolic heart failure (H)     Dyspnea     Fatigue     RIN (obstructive sleep apnea)     COPD (chronic obstructive pulmonary disease) (H)     Hypothyroidism     Paroxysmal A-fib (H)     HLD (hyperlipidemia)     Lymphedema     (HFpEF) heart failure with preserved ejection fraction (H)     Essential hypertension     Cardiac pacemaker in situ     DVT (deep venous thrombosis) (H)     CAD (coronary artery disease)     Obesity, Class III, BMI 40-49.9 (morbid obesity) (H)     Muscle tension headache     Heart failure with preserved ejection fraction, unspecified HF chronicity (H)     Status post coronary angiogram     Past Surgical History:   Procedure Laterality Date     cardiac stenting       CV RIGHT HEART CATH N/A 11/5/2019    Procedure: Right Heart Cath;  Surgeon: Roberto Hernandez MD;  Location:  HEART CARDIAC CATH LAB     ENT SURGERY      tonsillectomy     IMPLANT PACEMAKER       ORTHOPEDIC SURGERY Right 2012    knee replacement       Social History     Tobacco Use     Smoking status: Former Smoker     Packs/day: 0.50     Years: 25.00     Pack years: 12.50     Smokeless tobacco: Never Used     Tobacco comment: quit in 1989   Substance Use Topics     Alcohol use: No     Frequency: Never     Comment: quit in 1989     Family History   Problem Relation Age of Onset     Hypertension Father           Current Outpatient Medications   Medication Sig Dispense Refill     Acetylcarnitine HCl (ACETYL L-CARNITINE) 500 MG CAPS Take 1 capsule by mouth 2 times daily       albuterol (PROAIR HFA/PROVENTIL HFA/VENTOLIN HFA) 108 (90 Base) MCG/ACT inhaler Inhale 2 puffs into the lungs every 4 hours as needed for shortness of breath / dyspnea or wheezing 1 Inhaler 0     aspirin (ASA) 81 MG chewable tablet Take 1 tablet by mouth daily       atorvastatin (LIPITOR) 40 MG tablet Take 1 tablet (40 mg) by mouth every evening 90 tablet 3     carvedilol (COREG) 12.5 MG tablet Take 12.5 mg by mouth 2 times daily       clonazePAM (KLONOPIN) 0.5 MG tablet TAKE 1 TABLET BY MOUTH TWICE A DAY AS NEEDED 60 tablet 0     co-enzyme Q-10 100 MG CAPS capsule Take 100 mg by mouth daily       diltiazem ER COATED BEADS (CARDIZEM CD/CARTIA XT) 300 MG 24 hr capsule Take 1 capsule (300 mg) by mouth daily 90 capsule 0     furosemide (LASIX) 20 MG tablet TAKE 3 TABLETS BY MOUTH TWICE DAILY. 540 tablet 3     isosorbide mononitrate (IMDUR) 30 MG 24 hr tablet Take 1 tablet (30 mg) by mouth daily 90 tablet 3     levothyroxine (SYNTHROID/LEVOTHROID) 175 MCG tablet Take 1 tablet by mouth daily Additional 1/2 tablet on Sundays       lisinopril (PRINIVIL/ZESTRIL) 40 MG tablet Take 40 mg by mouth daily       metolazone (ZAROXOLYN) 2.5 MG tablet Take 1 tablet (2.5 mg) by mouth in the morning, Mon and Thur On Thursday 30 tablet 3     Multiple vitamin TABS Take 1 tablet by mouth daily       potassium chloride ER (K-TAB/KLOR-CON) 10 MEQ CR tablet Take 1 tablet (10 mEq) by mouth 2 times daily 90 tablet 3     tamsulosin (FLOMAX) 0.4 MG capsule Take 1 capsule (0.4 mg) by mouth daily 90 capsule 3     Taurine 500 MG CAPS Take 500 mg by mouth 2 times daily       venlafaxine (EFFEXOR) 37.5 MG tablet Take 37.5 mg by mouth 2 times daily       warfarin ANTICOAGULANT (COUMADIN) 5 MG tablet TAKE 1-1.5 TABLETS (5-7.5 MG) BY MOUTH DAILY AS DIRECTED BY INR CLINIC 135 tablet 0      Allergies   Allergen Reactions     No Known Allergies        Reviewed and updated as needed this visit by Provider         Review of Systems   ROS COMP: Constitutional, HEENT, cardiovascular, pulmonary, gi and gu POSITIVE for frequency systems are negative, except as otherwise noted.    This document serves as a record of the services and decisions personally performed and made by Dontrell Gómez MD. It was created on his behalf by Patric Montero, a trained medical scribe. The creation of this document is based on the provider's statements to the medical scribe.  Patric Montero November 18, 2019 2:45 PM          Objective    BP (!) 148/75 (BP Location: Left arm, Patient Position: Chair, Cuff Size: Adult Large)   Pulse 85   Temp 97  F (36.1  C) (Oral)   Ht 1.829 m (6')   Wt 137.9 kg (304 lb)   SpO2 96%   BMI 41.23 kg/m    Body mass index is 41.23 kg/m .     BP Recheck   136/80    Physical Exam   The pt has lost 8 lbs since LOV   Morbidly obese white male with mild dyspnea with activities of daily living   Neck was supple without adenopathy or thyromegaly his carotids were normal without bruits  Chest clear to auscultation and percussion  Cardiovascular S1 and S2 are physiologic without murmurs or gallops  Abdomen morbidly obese, bowel sounds were normal.  There is no palpable mass or organomegaly  Extremities nontender with 2-3+ brawny pretibial edema with excessive scaliness of his skin   Pulses pedal pulses are as described otherwise his pulses are bilaterally symmetrical throughout without bruits  Skin without significant abnormality      Diagnostic Test Results:  Labs reviewed in Epic  Results for orders placed or performed in visit on 11/18/19 (from the past 24 hour(s))   INR point of care   Result Value Ref Range    INR Protime 2.4 (A) 0.86 - 1.14           Assessment & Plan     PE (pulmonary thromboembolism) (H)      Paroxysmal atrial fibrillation (H)  Recommended the pt continue taking Lasix every day.  Recommended increasing his Zaroxolyn to 3x week on Tu, Th, and Sat.     Deep vein thrombosis (DVT) of proximal lower extremity, unspecified chronicity, unspecified laterality (H)  The pt has lost weight since his right heart cath however, his weight is above his recent baseline and we'll more aggressively diurese him by increasing his Zaroxolyn to 3x weekly along with his regular Lasix. Monitoring his renal function to avoid prerenal azotemia     Morbid obesity (H)  Start with new diuretic regimen. Continue to monitor diet and to exercise frequently.    Chronic obstructive pulmonary disease, unspecified COPD type (H)      Primary osteoarthritis of left knee      Adjustment disorder with depressed mood      RIN (obstructive sleep apnea)      Essential hypertension  Well controlled with current therapies  - Basic metabolic panel  - CBC with platelets  - **Basic metabolic panel FUTURE anytime    Hypothyroidism, unspecified type    - TSH with free T4 reflex         BMI:   Estimated body mass index is 41.23 kg/m  as calculated from the following:    Height as of this encounter: 1.829 m (6').    Weight as of this encounter: 137.9 kg (304 lb).   Weight management plan: Discussed healthy diet and exercise guidelines        FUTURE APPOINTMENTS:       - Follow-up visit in 2 weeks    Return in about 2 weeks (around 12/2/2019) for Lab Work.    The information in this document, created by the medical scribe for me, accurately reflects the services I personally performed and the decisions made by me. I have reviewed and approved this document for accuracy prior to leaving the patient care area.  November 18, 2019 3:10 PM    Dontrell Gómez MD  [facility]

## 2019-11-19 LAB
ANION GAP SERPL CALCULATED.3IONS-SCNC: 7 MMOL/L (ref 3–14)
BUN SERPL-MCNC: 19 MG/DL (ref 7–30)
CALCIUM SERPL-MCNC: 9.1 MG/DL (ref 8.5–10.1)
CHLORIDE SERPL-SCNC: 99 MMOL/L (ref 94–109)
CO2 SERPL-SCNC: 31 MMOL/L (ref 20–32)
CREAT SERPL-MCNC: 0.92 MG/DL (ref 0.66–1.25)
GFR SERPL CREATININE-BSD FRML MDRD: 78 ML/MIN/{1.73_M2}
GLUCOSE SERPL-MCNC: 88 MG/DL (ref 70–99)
POTASSIUM SERPL-SCNC: 3.4 MMOL/L (ref 3.4–5.3)
SODIUM SERPL-SCNC: 137 MMOL/L (ref 133–144)
TSH SERPL DL<=0.005 MIU/L-ACNC: 0.43 MU/L (ref 0.4–4)

## 2019-11-20 ENCOUNTER — HOSPITAL ENCOUNTER (OUTPATIENT)
Dept: OCCUPATIONAL THERAPY | Facility: CLINIC | Age: 79
Setting detail: THERAPIES SERIES
End: 2019-11-20
Attending: NURSE PRACTITIONER
Payer: MEDICARE

## 2019-11-20 ENCOUNTER — DOCUMENTATION ONLY (OUTPATIENT)
Dept: SLEEP MEDICINE | Facility: CLINIC | Age: 79
End: 2019-11-20
Payer: MEDICARE

## 2019-11-20 DIAGNOSIS — R60.0 BILATERAL LOWER EXTREMITY EDEMA: ICD-10-CM

## 2019-11-20 PROCEDURE — 97165 OT EVAL LOW COMPLEX 30 MIN: CPT | Mod: GO

## 2019-11-20 PROCEDURE — 97535 SELF CARE MNGMENT TRAINING: CPT | Mod: GO

## 2019-11-20 NOTE — PROGRESS NOTES
"   11/20/19 0850   Quick Adds   Quick Adds Certification   Rehab Discipline   Discipline OT   Type of Visit   Type of visit Initial Edema Evaluation   General Information   Start of care 11/20/19   Referring physician Destiny AGUSTIN CNP   Orders Evaluate and treat as indicated   Order date 11/12/19   Medical diagnosis bi-lateral lower extremity edema (PT/OT tx dx: lymphedema)   Edema onset 11/12/19   Affected body parts LLE;RLE   Edema etiology comments Patient with extensive cardiac disease and morbid obesity, history of PE, DVT, R TKA   Pertinent history of current problem (PT: include personal factors and/or comorbidities that impact the POC; OT: include additional occupational profile info) Additional PMH includes CAD with stenting, a-fib, pacer, RIN, COPD, CHF, chronic diastolic heart failure.  Patient on diuretics, anti-coagulated.  Scheduled for cardiac rehab evaluation.  Patient moved from [place], [place] last Sept. to be closer to family; has had episode of CDT in [place].  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 No   Have you fallen and had an injury in the past year? No   Abuse Screen (yes response referral indicated)   Feels Unsafe at Home or Work/School no   Feels Threatened by Someone no   Does Anyone Try to Keep You From Having Contact with Others or Doing Things Outside Your Home? no   Physical Signs of Abuse Present no   System Outcome Measures   Outcome Measures Lymphedema   Lymphedema Life Impact Scale (score range 0-72). A " higher score indicates greater impairment. 29   Subjective Report   Patient report of symptoms poor shoe fit, skin tightness, don't know how to manage   Patient / Family Goals   Patient / family goals statement reduce swelling, figure out new socks   Pain   Patient currently in pain No   Cognitive Status   Orientation Orientation to person, place and time   Level of consciousness Alert   Follows commands and answers questions 100% of the time   Personal safety and judgement Intact   Memory Intact   Edema Exam / Assessment   Skin condition comments 3+ pitting BL BBK and L dorsum of foot, 2+ R dorsum. Papillomas, fungus, hemosiderin staining, fibrosis lower 1/2 BBK, deep pitting with slow rebound, vasularities BLE, toes dusky BL; Stemmer's sign +.   Girth Measurements   Girth Measurements Other  (deferred until CDT initiated)   Activities of Daily Living   Activities of Daily Living independent   Gait / Locomotion   Gait / Locomotion slow gait   Sensory   Sensory perception comments light touch intact   Vascular Assessment   Vascular Assessment Vascular concerns   Planned Edema Interventions   Planned edema interventions Manual lymph drainage;Gradient compression bandaging;Fit for compression garment;Exercises;Precautions to prevent infection / exacerbation;Education;Skin care / precautions;Myofascial release;Home management program development   Planned edema intervention comments recommend CDT   Clinical Impression   Criteria for skilled therapeutic intervention met Yes   Therapy diagnosis chronic BLE lymphedema, 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   Influenced by the following impairments / conditions Stage 2;Stage 3   Assessment of Occupational Performance 1-3 Performance Deficits   Identified Performance Deficits limited understanding of long-term lymphedema symptom management, lymphedema stage 3 skin changes which may affect reduction outcome   Clinical Decision  Making (Complexity) Low complexity   Treatment Frequency 5x/week   Treatment duration 2 weeks   Patient / family and/or staff in agreement with plan of care Yes   Risks and benefits of therapy have been explained Yes   Goals   Edema Eval Goals 1;2;3;4   Goal 1   Goal identifier volume   Goal description For decreased risk of infection, skin breakdown/wounds & progressive soft-tissue fibrosis and improved fit of footwear, volume will be reduced by at least 200 mL in each BK.   Target date 01/20/20   Goal 2   Goal identifier GCB   Goal description To reduce volume of lymphedema and risk of soft tissue fibrosis, pt will tolerate up to 23hr/day wear gradient compression bandaging (GCB) of BBK.   Target date 01/20/20   Goal 3   Goal identifier home program   Goal description For long-term home mgmt chronic lymphedema pt/caregiver independent in home program a. GCB/GCB alternative garment for night wear b. compression garment for day wear c. ex to incr lymph flow/self-MLD.   Target date 01/20/20   Goal 4   Goal identifier precautions   Goal description Pt will independently verbalize the signs/symptoms of lymphedema, precautions and how to obtain a future lymphedema referral if edema persists to preserve skin integrity, prevent infection and preserve functional mobility.      Target date 01/20/20   Total Evaluation Time   OT Eval, Low Complexity Minutes (16065) 15   Certification   Certification date from 11/20/19   Certification date to 02/20/20   Medical Diagnosis bi-lateral lower extremity edema (PT/OT treatment diagnosis: lymphedema)

## 2019-11-20 NOTE — PROGRESS NOTES
[facility]        OUTPATIENT OCCUPATIONAL THERAPY EDEMA EVALUATION  PLAN OF TREATMENT FOR OUTPATIENT REHABILITATION  (COMPLETE FOR INITIAL CLAIMS ONLY)  Patient's Last Name, First Name, Erich Monet                              Provider s Name:   [facility] Medical Record No.  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 Goal description: For decreased risk of infection, skin breakdown/wounds & progressive soft-tissue fibrosis and improved fit of footwear, volume will be reduced by at least 200 mL in each BK.       Target date: 01/20/20  2. Goal description: To reduce volume of lymphedema and risk of soft tissue fibrosis, pt will tolerate up to 23hr/day wear gradient compression bandaging (GCB) of BBK.       Target date: 01/20/20  3. Goal description: For long-term home mgmt chronic lymphedema pt/caregiver independent in home program a. GCB/GCB alternative garment for night wear b. compression garment for day wear c. ex to incr lymph flow/self-MLD.       Target date: 01/20/20  4. Goal description: Pt will independently verbalize the signs/symptoms of lymphedema,  precautions and how to obtain a future lymphedema referral if edema persists to preserve skin integrity, prevent infection and preserve functional mobility.          Target date: 01/20/20              Treatment Frequency: 5x/week   Treatment duration: 2 weeks    She Mike OT                                    I CERTIFY THE NEED FOR THESE SERVICES FURNISHED UNDER        THIS PLAN OF TREATMENT AND WHILE UNDER MY CARE     (Physician co-signature of this document indicates review and certification of the therapy plan).                   Certification date from: 11/20/19       Certification date to: 02/20/20           Referring physician: Destiny AGUSTIN CNP   Initial Assessment  See Epic Evaluation- Start of care: 11/20/19

## 2019-11-29 ENCOUNTER — TELEPHONE (OUTPATIENT)
Dept: FAMILY MEDICINE | Facility: CLINIC | Age: 79
End: 2019-11-29

## 2019-11-29 DIAGNOSIS — F43.21 ADJUSTMENT DISORDER WITH DEPRESSED MOOD: ICD-10-CM

## 2019-12-02 DIAGNOSIS — I10 ESSENTIAL HYPERTENSION: ICD-10-CM

## 2019-12-02 DIAGNOSIS — I25.10 CORONARY ARTERY DISEASE INVOLVING NATIVE CORONARY ARTERY OF NATIVE HEART WITHOUT ANGINA PECTORIS: ICD-10-CM

## 2019-12-02 LAB
ANION GAP SERPL CALCULATED.3IONS-SCNC: 8 MMOL/L (ref 3–14)
BUN SERPL-MCNC: 28 MG/DL (ref 7–30)
CALCIUM SERPL-MCNC: 9.3 MG/DL (ref 8.5–10.1)
CHLORIDE SERPL-SCNC: 95 MMOL/L (ref 94–109)
CO2 SERPL-SCNC: 32 MMOL/L (ref 20–32)
CREAT SERPL-MCNC: 0.93 MG/DL (ref 0.66–1.25)
GFR SERPL CREATININE-BSD FRML MDRD: 77 ML/MIN/{1.73_M2}
GLUCOSE SERPL-MCNC: 223 MG/DL (ref 70–99)
POTASSIUM SERPL-SCNC: 2.9 MMOL/L (ref 3.4–5.3)
SODIUM SERPL-SCNC: 135 MMOL/L (ref 133–144)

## 2019-12-02 PROCEDURE — 80048 BASIC METABOLIC PNL TOTAL CA: CPT | Performed by: INTERNAL MEDICINE

## 2019-12-02 PROCEDURE — 36415 COLL VENOUS BLD VENIPUNCTURE: CPT | Performed by: INTERNAL MEDICINE

## 2019-12-02 NOTE — TELEPHONE ENCOUNTER
Patient is calling to inform that he is leaving out of town on Wed and would like a refill before then.

## 2019-12-02 NOTE — TELEPHONE ENCOUNTER
Last Written Prescription Date:  11/6/2019  Last Fill Quantity: 60,  # refills: 0   Last office visit: 11/18/2019 with prescribing provider:  Dalia   Future Office Visit:   Next 5 appointments (look out 90 days)    Dec 11, 2019  9:00 AM CST  [facility] Dontrell [facility]z [facility] [address] [facility] [id number] [phone]   Dec 17, 2019  9:30 AM CST  Return Visit with [facility] [address] [id number] [phone] OPT 2           Requested Prescriptions   Pending Prescriptions Disp Refills     clonazePAM (KLONOPIN) 0.5 MG tablet [Pharmacy Med Name: CLONAZEPAM 0.5 MG TABLET] 60 tablet 0     Sig: TAKE 1 TABLET BY MOUTH TWICE A DAY AS NEEDED       There is no refill protocol information for this order

## 2019-12-02 NOTE — TELEPHONE ENCOUNTER
Routing refill request to provider for review/approval because:  Drug not on the [facility] refill protocol   RN unable to check  under this provider.    NELA ContrerasN, RN  [facility]

## 2019-12-03 RX ORDER — CLONAZEPAM 0.5 MG/1
TABLET ORAL
Qty: 60 TABLET | Refills: 0 | Status: SHIPPED | OUTPATIENT
Start: 2019-12-03 | End: 2020-01-02

## 2019-12-04 NOTE — TELEPHONE ENCOUNTER
See ERX log, Rx was already e-prescribed by PCP on 12/3/19  E-Prescribing Status: Receipt confirmed by pharmacy (12/3/2019  4:06 PM CST)    Message left for patient notifying him Rx was sent yesterday and he should contact pharmacy to have this filled    Keisha MARTIN RN

## 2019-12-11 LAB
MDC_IDC_EPISODE_DTM: NORMAL
MDC_IDC_EPISODE_DURATION: 14 S
MDC_IDC_EPISODE_DURATION: 14 S
MDC_IDC_EPISODE_DURATION: 15 S
MDC_IDC_EPISODE_DURATION: 16 S
MDC_IDC_EPISODE_DURATION: 17 S
MDC_IDC_EPISODE_DURATION: 22 S
MDC_IDC_EPISODE_DURATION: 5 S
MDC_IDC_EPISODE_DURATION: 6 S
MDC_IDC_EPISODE_DURATION: 7 S
MDC_IDC_EPISODE_DURATION: 8 S
MDC_IDC_EPISODE_DURATION: 8 S
MDC_IDC_EPISODE_ID: NORMAL
MDC_IDC_EPISODE_TYPE: NORMAL
MDC_IDC_EPISODE_VENDOR_TYPE: NORMAL
MDC_IDC_LEAD_IMPLANT_DT: NORMAL
MDC_IDC_LEAD_IMPLANT_DT: NORMAL
MDC_IDC_LEAD_LOCATION: NORMAL
MDC_IDC_LEAD_LOCATION: NORMAL
MDC_IDC_LEAD_LOCATION_DETAIL_1: NORMAL
MDC_IDC_LEAD_LOCATION_DETAIL_1: NORMAL
MDC_IDC_LEAD_MFG: NORMAL
MDC_IDC_LEAD_MFG: NORMAL
MDC_IDC_LEAD_MODEL: NORMAL
MDC_IDC_LEAD_MODEL: NORMAL
MDC_IDC_LEAD_POLARITY_TYPE: NORMAL
MDC_IDC_LEAD_POLARITY_TYPE: NORMAL
MDC_IDC_LEAD_SERIAL: NORMAL
MDC_IDC_LEAD_SERIAL: NORMAL
MDC_IDC_MSMT_BATTERY_DTM: NORMAL
MDC_IDC_MSMT_BATTERY_REMAINING_LONGEVITY: 48 MO
MDC_IDC_MSMT_BATTERY_REMAINING_PERCENTAGE: 74 %
MDC_IDC_MSMT_BATTERY_STATUS: NORMAL
MDC_IDC_MSMT_LEADCHNL_RA_IMPEDANCE_VALUE: 476 OHM
MDC_IDC_MSMT_LEADCHNL_RA_PACING_THRESHOLD_AMPLITUDE: 0.4 V
MDC_IDC_MSMT_LEADCHNL_RA_PACING_THRESHOLD_PULSEWIDTH: 0.4 MS
MDC_IDC_MSMT_LEADCHNL_RV_IMPEDANCE_VALUE: 486 OHM
MDC_IDC_MSMT_LEADCHNL_RV_PACING_THRESHOLD_AMPLITUDE: 1.9 V
MDC_IDC_MSMT_LEADCHNL_RV_PACING_THRESHOLD_PULSEWIDTH: 0.4 MS
MDC_IDC_PG_IMPLANT_DTM: NORMAL
MDC_IDC_PG_MFG: NORMAL
MDC_IDC_PG_MODEL: NORMAL
MDC_IDC_PG_SERIAL: NORMAL
MDC_IDC_PG_TYPE: NORMAL
MDC_IDC_SESS_CLINIC_NAME: NORMAL
MDC_IDC_SESS_DTM: NORMAL
MDC_IDC_SESS_TYPE: NORMAL
MDC_IDC_SET_BRADY_AT_MODE_SWITCH_MODE: NORMAL
MDC_IDC_SET_BRADY_AT_MODE_SWITCH_RATE: 150 {BEATS}/MIN
MDC_IDC_SET_BRADY_LOWRATE: 60 {BEATS}/MIN
MDC_IDC_SET_BRADY_MAX_SENSOR_RATE: 130 {BEATS}/MIN
MDC_IDC_SET_BRADY_MAX_TRACKING_RATE: 130 {BEATS}/MIN
MDC_IDC_SET_BRADY_MODE: NORMAL
MDC_IDC_SET_BRADY_PAV_DELAY_HIGH: 220 MS
MDC_IDC_SET_BRADY_PAV_DELAY_LOW: 250 MS
MDC_IDC_SET_BRADY_SAV_DELAY_HIGH: 220 MS
MDC_IDC_SET_BRADY_SAV_DELAY_LOW: 250 MS
MDC_IDC_SET_LEADCHNL_RA_PACING_AMPLITUDE: 2 V
MDC_IDC_SET_LEADCHNL_RA_PACING_POLARITY: NORMAL
MDC_IDC_SET_LEADCHNL_RA_PACING_PULSEWIDTH: 0.4 MS
MDC_IDC_SET_LEADCHNL_RA_SENSING_ADAPTATION_MODE: NORMAL
MDC_IDC_SET_LEADCHNL_RA_SENSING_POLARITY: NORMAL
MDC_IDC_SET_LEADCHNL_RA_SENSING_SENSITIVITY: 0.5 MV
MDC_IDC_SET_LEADCHNL_RV_PACING_AMPLITUDE: 2.4 V
MDC_IDC_SET_LEADCHNL_RV_PACING_CAPTURE_MODE: NORMAL
MDC_IDC_SET_LEADCHNL_RV_PACING_POLARITY: NORMAL
MDC_IDC_SET_LEADCHNL_RV_PACING_PULSEWIDTH: 0.4 MS
MDC_IDC_SET_LEADCHNL_RV_SENSING_ADAPTATION_MODE: NORMAL
MDC_IDC_SET_LEADCHNL_RV_SENSING_POLARITY: NORMAL
MDC_IDC_SET_LEADCHNL_RV_SENSING_SENSITIVITY: 1 MV
MDC_IDC_SET_ZONE_DETECTION_INTERVAL: 375 MS
MDC_IDC_SET_ZONE_TYPE: NORMAL
MDC_IDC_SET_ZONE_VENDOR_TYPE: NORMAL
MDC_IDC_STAT_AT_BURDEN_PERCENT: 1 %
MDC_IDC_STAT_AT_DTM_END: NORMAL
MDC_IDC_STAT_AT_DTM_START: NORMAL
MDC_IDC_STAT_BRADY_DTM_END: NORMAL
MDC_IDC_STAT_BRADY_DTM_START: NORMAL
MDC_IDC_STAT_BRADY_RA_PERCENT_PACED: 2 %
MDC_IDC_STAT_BRADY_RV_PERCENT_PACED: 0 %
MDC_IDC_STAT_EPISODE_RECENT_COUNT: 0
MDC_IDC_STAT_EPISODE_RECENT_COUNT: 1
MDC_IDC_STAT_EPISODE_RECENT_COUNT: 22
MDC_IDC_STAT_EPISODE_RECENT_COUNT: 256
MDC_IDC_STAT_EPISODE_RECENT_COUNT_DTM_END: NORMAL
MDC_IDC_STAT_EPISODE_RECENT_COUNT_DTM_START: NORMAL
MDC_IDC_STAT_EPISODE_TYPE: NORMAL
MDC_IDC_STAT_EPISODE_VENDOR_TYPE: NORMAL

## 2019-12-16 DIAGNOSIS — I50.30 HEART FAILURE WITH PRESERVED EJECTION FRACTION, UNSPECIFIED HF CHRONICITY (H): Primary | ICD-10-CM

## 2019-12-16 DIAGNOSIS — I10 HTN (HYPERTENSION): ICD-10-CM

## 2019-12-16 DIAGNOSIS — I10 ESSENTIAL HYPERTENSION: ICD-10-CM

## 2019-12-23 ENCOUNTER — TELEPHONE (OUTPATIENT)
Dept: FAMILY MEDICINE | Facility: CLINIC | Age: 79
End: 2019-12-23

## 2019-12-23 NOTE — TELEPHONE ENCOUNTER
Pt is overdue for an INR check. Pt already coming into the clinic on 1/7/20 to see /Dalia at 3:30 pm. Pt scheduled for INR check on 1/7/20 at 3 pm. Left a detailed message for pt with this INR clinic appointment. Pt to call back if he needs to reschedule.

## 2019-12-24 ENCOUNTER — HOSPITAL ENCOUNTER (OUTPATIENT)
Dept: CARDIAC REHAB | Facility: CLINIC | Age: 79
End: 2019-12-24
Attending: INTERNAL MEDICINE
Payer: MEDICARE

## 2019-12-24 DIAGNOSIS — J44.9 STAGE 2 MODERATE COPD BY GOLD CLASSIFICATION (H): ICD-10-CM

## 2019-12-24 PROCEDURE — 40000244 ZZH STATISTIC VISIT PULM REHAB

## 2019-12-24 PROCEDURE — G0424 PULMONARY REHAB W EXER: HCPCS

## 2020-01-02 DIAGNOSIS — I50.32 CHRONIC DIASTOLIC HEART FAILURE (H): ICD-10-CM

## 2020-01-02 RX ORDER — METOLAZONE 2.5 MG/1
2.5 TABLET ORAL
Qty: 36 TABLET | Refills: 3 | Status: SHIPPED | OUTPATIENT
Start: 2020-01-03 | End: 2020-01-15

## 2020-01-02 NOTE — TELEPHONE ENCOUNTER
Reason for Call:  Medication or medication refill:    Do you use a [facility]?  Name of the pharmacy and phone number for the current request:      [facility] [address] [place] [address] [address]    Name of the medication requested: metolazone (ZAROXOLYN) 2.5 MG tablet    Other request: Erich needs this refilled as he's out. He also says that the intake should be Mon, Wed and Fri and not the direction we have on the RX now.    Please update.    Can we leave a detailed message on this number? YES    Phone number patient can be reached at: Cell number on file:    Telephone Information:   Mobile [phone]       Best Time: any    Call taken on 1/2/2020 at 2:20 PM by Oscar Stafford

## 2020-01-07 ENCOUNTER — OFFICE VISIT (OUTPATIENT)
Dept: FAMILY MEDICINE | Facility: CLINIC | Age: 80
End: 2020-01-07
Payer: MEDICARE

## 2020-01-07 ENCOUNTER — ANTICOAGULATION THERAPY VISIT (OUTPATIENT)
Dept: NURSING | Facility: CLINIC | Age: 80
End: 2020-01-07
Payer: MEDICARE

## 2020-01-07 VITALS
DIASTOLIC BLOOD PRESSURE: 66 MMHG | BODY MASS INDEX: 40.63 KG/M2 | TEMPERATURE: 98 F | HEIGHT: 72 IN | SYSTOLIC BLOOD PRESSURE: 110 MMHG | HEART RATE: 63 BPM | OXYGEN SATURATION: 95 % | WEIGHT: 300 LBS

## 2020-01-07 DIAGNOSIS — G47.33 OSA (OBSTRUCTIVE SLEEP APNEA): ICD-10-CM

## 2020-01-07 DIAGNOSIS — E66.01 MORBID OBESITY (H): ICD-10-CM

## 2020-01-07 DIAGNOSIS — J41.0 SIMPLE CHRONIC BRONCHITIS (H): ICD-10-CM

## 2020-01-07 DIAGNOSIS — I82.4Z9 DEEP VEIN THROMBOSIS (DVT) OF DISTAL VEIN OF LOWER EXTREMITY, UNSPECIFIED CHRONICITY, UNSPECIFIED LATERALITY (H): ICD-10-CM

## 2020-01-07 DIAGNOSIS — E03.9 HYPOTHYROIDISM, UNSPECIFIED TYPE: ICD-10-CM

## 2020-01-07 DIAGNOSIS — I26.99 PE (PULMONARY THROMBOEMBOLISM) (H): Primary | ICD-10-CM

## 2020-01-07 DIAGNOSIS — I50.32 CHRONIC DIASTOLIC HEART FAILURE (H): ICD-10-CM

## 2020-01-07 DIAGNOSIS — I10 ESSENTIAL HYPERTENSION: ICD-10-CM

## 2020-01-07 DIAGNOSIS — I48.0 PAROXYSMAL ATRIAL FIBRILLATION (H): ICD-10-CM

## 2020-01-07 LAB
ERYTHROCYTE [DISTWIDTH] IN BLOOD BY AUTOMATED COUNT: 15.7 % (ref 10–15)
HCT VFR BLD AUTO: 41.6 % (ref 40–53)
HGB BLD-MCNC: 13.5 G/DL (ref 13.3–17.7)
INR POINT OF CARE: 3.9 (ref 0.86–1.14)
MCH RBC QN AUTO: 32.1 PG (ref 26.5–33)
MCHC RBC AUTO-ENTMCNC: 32.5 G/DL (ref 31.5–36.5)
MCV RBC AUTO: 99 FL (ref 78–100)
NT-PROBNP SERPL-MCNC: 55 PG/ML (ref 0–450)
PLATELET # BLD AUTO: 215 10E9/L (ref 150–450)
RBC # BLD AUTO: 4.2 10E12/L (ref 4.4–5.9)
WBC # BLD AUTO: 12.6 10E9/L (ref 4–11)

## 2020-01-07 PROCEDURE — 85610 PROTHROMBIN TIME: CPT | Mod: QW

## 2020-01-07 PROCEDURE — 99207 ZZC NO CHARGE NURSE ONLY: CPT

## 2020-01-07 PROCEDURE — 84443 ASSAY THYROID STIM HORMONE: CPT | Performed by: INTERNAL MEDICINE

## 2020-01-07 PROCEDURE — 83880 ASSAY OF NATRIURETIC PEPTIDE: CPT | Performed by: INTERNAL MEDICINE

## 2020-01-07 PROCEDURE — 36416 COLLJ CAPILLARY BLOOD SPEC: CPT

## 2020-01-07 PROCEDURE — 80048 BASIC METABOLIC PNL TOTAL CA: CPT | Performed by: INTERNAL MEDICINE

## 2020-01-07 PROCEDURE — 85027 COMPLETE CBC AUTOMATED: CPT | Performed by: INTERNAL MEDICINE

## 2020-01-07 PROCEDURE — 99214 OFFICE O/P EST MOD 30 MIN: CPT | Performed by: INTERNAL MEDICINE

## 2020-01-07 ASSESSMENT — MIFFLIN-ST. JEOR: SCORE: 2108.79

## 2020-01-07 NOTE — PROGRESS NOTES
Subjective     Erich Craven is a 79 year old male who presents to clinic today for the following health issues:    HPI   Medication Followup of Metolazone    Taking Medication as prescribed: yes    Side Effects:  None    Medication Helping Symptoms:  unsure   The pt presents to the clinic for a 3 month f/u exam. The pt notes that he recently had cold-like sx's which persisted for 2 weeks. He complains of fatigue, dizziness, and SOB. The pt remarks that he is getting plenty of sleep, but is still feeling fatigued. He states that his INR today was 3.9.     Patient Active Problem List   Diagnosis     Morbid obesity (H)     PE (pulmonary thromboembolism) (H)     HTN (hypertension)     Chronic diastolic heart failure (H)     Dyspnea     Fatigue     RIN (obstructive sleep apnea)     COPD (chronic obstructive pulmonary disease) (H)     Hypothyroidism     Paroxysmal A-fib (H)     HLD (hyperlipidemia)     Lymphedema     (HFpEF) heart failure with preserved ejection fraction (H)     Essential hypertension     Cardiac pacemaker in situ     DVT (deep venous thrombosis) (H)     CAD (coronary artery disease)     Obesity, Class III, BMI 40-49.9 (morbid obesity) (H)     Muscle tension headache     Heart failure with preserved ejection fraction, unspecified HF chronicity (H)     Status post coronary angiogram     Past Surgical History:   Procedure Laterality Date     cardiac stenting       CV RIGHT HEART CATH N/A 11/5/2019    Procedure: Right Heart Cath;  Surgeon: Roberto Hernandez MD;  Location:  [facility]ATH [facility]     ENT SURGERY      tonsillectomy     IMPLANT PACEMAKER       ORTHOPEDIC SURGERY Right 2012    knee replacement       Social History     Tobacco Use     Smoking status: Former Smoker     Packs/day: 0.50     Years: 25.00     Pack years: 12.50     Smokeless tobacco: Never Used     Tobacco comment: quit in 1989   Substance Use Topics     Alcohol use: No     Frequency: Never     Comment: quit in 1989     Family  History   Problem Relation Age of Onset     Hypertension Father          Current Outpatient Medications   Medication Sig Dispense Refill     Acetylcarnitine HCl (ACETYL L-CARNITINE) 500 MG CAPS Take 1 capsule by mouth 2 times daily       albuterol (PROAIR HFA/PROVENTIL HFA/VENTOLIN HFA) 108 (90 Base) MCG/ACT inhaler Inhale 2 puffs into the lungs every 4 hours as needed for shortness of breath / dyspnea or wheezing 1 Inhaler 0     aspirin (ASA) 81 MG chewable tablet Take 1 tablet by mouth daily       atorvastatin (LIPITOR) 40 MG tablet Take 1 tablet (40 mg) by mouth every evening 90 tablet 3     carvedilol (COREG) 12.5 MG tablet Take 12.5 mg by mouth 2 times daily       clonazePAM (KLONOPIN) 0.5 MG tablet TAKE 1 TABLET BY MOUTH TWICE A DAY AS NEEDED 60 tablet 0     co-enzyme Q-10 100 MG CAPS capsule Take 100 mg by mouth daily       diltiazem ER COATED BEADS (CARDIZEM CD/CARTIA XT) 300 MG 24 hr capsule Take 1 capsule (300 mg) by mouth daily 90 capsule 0     furosemide (LASIX) 20 MG tablet TAKE 3 TABLETS BY MOUTH TWICE DAILY. 540 tablet 3     isosorbide mononitrate (IMDUR) 30 MG 24 hr tablet Take 1 tablet (30 mg) by mouth daily 90 tablet 3     levothyroxine (SYNTHROID/LEVOTHROID) 175 MCG tablet Take 1 tablet by mouth daily Additional 1/2 tablet on Sundays       lisinopril (PRINIVIL/ZESTRIL) 40 MG tablet Take 40 mg by mouth daily       metolazone (ZAROXOLYN) 2.5 MG tablet Take 1 tablet (2.5 mg) by mouth Every Mon, Wed, Fri Morning On Thursday 36 tablet 3     Multiple vitamin TABS Take 1 tablet by mouth daily       potassium chloride ER (K-TAB/KLOR-CON) 10 MEQ CR tablet Take 1 tablet (10 mEq) by mouth 2 times daily 90 tablet 3     tamsulosin (FLOMAX) 0.4 MG capsule Take 1 capsule (0.4 mg) by mouth daily 90 capsule 3     Taurine 500 MG CAPS Take 500 mg by mouth 2 times daily       venlafaxine (EFFEXOR) 37.5 MG tablet Take 37.5 mg by mouth 2 times daily       warfarin ANTICOAGULANT (COUMADIN) 5 MG tablet TAKE 1-1.5 TABLETS  (5-7.5 MG) BY MOUTH DAILY AS DIRECTED BY INR CLINIC 135 tablet 0     Allergies   Allergen Reactions     No Known Allergies        Reviewed and updated as needed this visit by Provider         Review of Systems   ROS COMP: Constitutional, HEENT, cardiovascular, pulmonary, gi and gu systems are negative, except as otherwise noted.    This document serves as a record of the services and decisions personally performed and made by Dontrell Gómez MD. It was created on his behalf by Patric Montero, a trained medical scribe. The creation of this document is based on the provider's statements to the medical scribe.  Patric Montero January 7, 2020 4:13 PM          Objective    /66 (BP Location: Left arm, Patient Position: Sitting, Cuff Size: Adult Large)   Pulse 63   Temp 98  F (36.7  C) (Oral)   Ht 1.829 m (6')   Wt 136.1 kg (300 lb)   SpO2 95%   BMI 40.69 kg/m    Body mass index is 40.69 kg/m .     Physical Exam   Neck was supple without adenopathy or thyromegaly his carotids were normal without bruits  Chest decreased breath sounds at both bases clear to auscultation and percussion  Cardiovascular S1 and S2 are  irregularly irregular without murmurs or gallops  Abdomen obese, bowel sounds were normal.  There is no palpable mass or organomegaly  Extremities nontender with 2-3+ pretibial edema with lichenification of his skin   Pulses pedal pulses are as described otherwise his pulses are bilaterally symmetrical throughout without bruits  Skin without significant abnormality  +JVD    Diagnostic Test Results:  Labs reviewed in Epic  Results for orders placed or performed in visit on 01/07/20 (from the past 24 hour(s))   CBC with platelets   Result Value Ref Range    WBC 12.6 (H) 4.0 - 11.0 10e9/L    RBC Count 4.20 (L) 4.4 - 5.9 10e12/L    Hemoglobin 13.5 13.3 - 17.7 g/dL    Hematocrit 41.6 40.0 - 53.0 %    MCV 99 78 - 100 fl    MCH 32.1 26.5 - 33.0 pg    MCHC 32.5 31.5 - 36.5 g/dL    RDW 15.7 (H) 10.0 - 15.0 %     Platelet Count 215 150 - 450 10e9/L           Assessment & Plan     PE (pulmonary thromboembolism) (H)      Chronic diastolic heart failure (H)  Recommended that the pt take 2 puffs of his albuterol inhaler QID. The pt should also continue with his Lasix per usual. The pt should take 1 tablet of Metolazone and 2 tablets tomorrow. He should f/u with the clinic on Thursday (1/9). The pt should only have 6, 8oz glasses of beverage daily.   - Basic metabolic panel  - CBC with platelets  - TSH with free T4 reflex  - BNP-N terminal pro    Paroxysmal atrial fibrillation (H)      Morbid obesity (H)      Deep vein thrombosis (DVT) of distal vein of lower extremity, unspecified chronicity, unspecified laterality (H)      Simple chronic bronchitis (H)      RIN (obstructive sleep apnea)      Hypothyroidism, unspecified type    - TSH with free T4 reflex    Essential hypertension  Well controlled with current therapies.   - Basic metabolic panel  - Basic metabolic panel  - CBC with platelets           FUTURE APPOINTMENTS:       - Follow-up visit in Thursday    Return in about 2 days (around 1/9/2020) for Follow Up.    The information in this document, created by the medical scribe for me, accurately reflects the services I personally performed and the decisions made by me. I have reviewed and approved this document for accuracy prior to leaving the patient care area.  January 7, 2020 4:52 PM    Dontrell Gómez MD  [facility]

## 2020-01-07 NOTE — PROGRESS NOTES
ANTICOAGULATION FOLLOW-UP CLINIC VISIT    Patient Name:  Erich Craven  Date:  1/7/2020  Contact Type:  Face to Face    SUBJECTIVE:  Patient Findings     Positives:   Change in health    Comments:   Pt reports low energy and fatigue. Pt saw  today after the INR appointment.        Clinical Outcomes     Comments:   Pt reports low energy and fatigue. Pt saw  today after the INR appointment.           OBJECTIVE    INR Protime   Date Value Ref Range Status   01/07/2020 3.9 (A) 0.86 - 1.14 Final       ASSESSMENT / PLAN  INR assessment SUPRA    Recheck INR In: 2 WEEKS    INR Location Clinic      Anticoagulation Summary  As of 1/7/2020    INR goal:   2.0-3.0   TTR:   70.8 % (8.9 mo)   INR used for dosing:   3.9! (1/7/2020)   Warfarin maintenance plan:   7.5 mg (5 mg x 1.5) every Mon, Wed, Fri; 5 mg (5 mg x 1) all other days   Full warfarin instructions:   1/8: Hold; 1/9: 2.5 mg; Otherwise 7.5 mg every Mon, Wed, Fri; 5 mg all other days   Weekly warfarin total:   42.5 mg   Plan last modified:   Lanny Spivey RN (4/4/2019)   Next INR check:   1/21/2020   Target end date:            Anticoagulation Episode Summary     INR check location:       Preferred lab:       Send INR reminders to:   SERENITY BECERRA    Comments:         Anticoagulation Care Providers     Provider Role Specialty Phone number    Dontrell Gómez MD Referring Internal Medicine [phone]            See the Encounter Report to view Anticoagulation Flowsheet and Dosing Calendar (Go to Encounters tab in chart review, and find the Anticoagulation Therapy Visit)    Pt INR is 3.9 today. Pt already took his warfarin today. Pt advised to HOLD warfarin tomorrow 1/8/20 then take 2.5 mg on Thursday 1/9/20 then continue maintenance dose of 7.5 mg on Mondays, Wednesdays, Fridays and 5 mg all the other days. Recheck INR in 2 weeks or sooner as needed. Jaun aware if signs of clotting (pain, tenderness, swelling, color change in leg or arm, SOB) and  bleeding occur (blood in stool, urine, large bruising, bleeding gums, nosebleeds) to have INR check sooner. If sx severe report to ER or concerned for stroke call 911. If general questions or concerns arise, call clinic.         Delia Jang RN

## 2020-01-08 LAB
ANION GAP SERPL CALCULATED.3IONS-SCNC: 7 MMOL/L (ref 3–14)
ANION GAP SERPL CALCULATED.3IONS-SCNC: 9 MMOL/L (ref 3–14)
BUN SERPL-MCNC: 24 MG/DL (ref 7–30)
BUN SERPL-MCNC: 26 MG/DL (ref 7–30)
CALCIUM SERPL-MCNC: 8.9 MG/DL (ref 8.5–10.1)
CALCIUM SERPL-MCNC: 9.1 MG/DL (ref 8.5–10.1)
CHLORIDE SERPL-SCNC: 96 MMOL/L (ref 94–109)
CHLORIDE SERPL-SCNC: 97 MMOL/L (ref 94–109)
CO2 SERPL-SCNC: 29 MMOL/L (ref 20–32)
CO2 SERPL-SCNC: 33 MMOL/L (ref 20–32)
CREAT SERPL-MCNC: 0.89 MG/DL (ref 0.66–1.25)
CREAT SERPL-MCNC: 0.92 MG/DL (ref 0.66–1.25)
GFR SERPL CREATININE-BSD FRML MDRD: 78 ML/MIN/{1.73_M2}
GFR SERPL CREATININE-BSD FRML MDRD: 80 ML/MIN/{1.73_M2}
GLUCOSE SERPL-MCNC: 152 MG/DL (ref 70–99)
GLUCOSE SERPL-MCNC: 153 MG/DL (ref 70–99)
POTASSIUM SERPL-SCNC: 2.9 MMOL/L (ref 3.4–5.3)
POTASSIUM SERPL-SCNC: 3.3 MMOL/L (ref 3.4–5.3)
SODIUM SERPL-SCNC: 135 MMOL/L (ref 133–144)
SODIUM SERPL-SCNC: 136 MMOL/L (ref 133–144)
TSH SERPL DL<=0.005 MIU/L-ACNC: 0.53 MU/L (ref 0.4–4)

## 2020-01-09 ENCOUNTER — OFFICE VISIT (OUTPATIENT)
Dept: FAMILY MEDICINE | Facility: CLINIC | Age: 80
End: 2020-01-09
Payer: MEDICARE

## 2020-01-09 VITALS
HEIGHT: 72 IN | OXYGEN SATURATION: 93 % | BODY MASS INDEX: 40.63 KG/M2 | WEIGHT: 300 LBS | SYSTOLIC BLOOD PRESSURE: 115 MMHG | DIASTOLIC BLOOD PRESSURE: 81 MMHG | HEART RATE: 93 BPM | TEMPERATURE: 98.3 F

## 2020-01-09 DIAGNOSIS — E03.9 HYPOTHYROIDISM, UNSPECIFIED TYPE: ICD-10-CM

## 2020-01-09 DIAGNOSIS — I10 ESSENTIAL HYPERTENSION: ICD-10-CM

## 2020-01-09 DIAGNOSIS — E66.01 MORBID OBESITY (H): ICD-10-CM

## 2020-01-09 DIAGNOSIS — J41.0 SIMPLE CHRONIC BRONCHITIS (H): ICD-10-CM

## 2020-01-09 DIAGNOSIS — I48.0 PAROXYSMAL ATRIAL FIBRILLATION (H): ICD-10-CM

## 2020-01-09 DIAGNOSIS — G47.33 OSA (OBSTRUCTIVE SLEEP APNEA): ICD-10-CM

## 2020-01-09 DIAGNOSIS — F43.21 ADJUSTMENT DISORDER WITH DEPRESSED MOOD: ICD-10-CM

## 2020-01-09 DIAGNOSIS — I50.32 CHRONIC DIASTOLIC HEART FAILURE (H): Primary | ICD-10-CM

## 2020-01-09 DIAGNOSIS — I26.99 PE (PULMONARY THROMBOEMBOLISM) (H): ICD-10-CM

## 2020-01-09 DIAGNOSIS — E87.6 HYPOKALEMIA: ICD-10-CM

## 2020-01-09 DIAGNOSIS — I82.4Y9 DEEP VEIN THROMBOSIS (DVT) OF PROXIMAL LOWER EXTREMITY, UNSPECIFIED CHRONICITY, UNSPECIFIED LATERALITY (H): ICD-10-CM

## 2020-01-09 LAB
ERYTHROCYTE [DISTWIDTH] IN BLOOD BY AUTOMATED COUNT: 15.8 % (ref 10–15)
HCT VFR BLD AUTO: 43.6 % (ref 40–53)
HGB BLD-MCNC: 14.3 G/DL (ref 13.3–17.7)
MCH RBC QN AUTO: 32.2 PG (ref 26.5–33)
MCHC RBC AUTO-ENTMCNC: 32.8 G/DL (ref 31.5–36.5)
MCV RBC AUTO: 98 FL (ref 78–100)
PLATELET # BLD AUTO: 214 10E9/L (ref 150–450)
RBC # BLD AUTO: 4.44 10E12/L (ref 4.4–5.9)
WBC # BLD AUTO: 12.4 10E9/L (ref 4–11)

## 2020-01-09 PROCEDURE — 99214 OFFICE O/P EST MOD 30 MIN: CPT | Performed by: INTERNAL MEDICINE

## 2020-01-09 PROCEDURE — 85027 COMPLETE CBC AUTOMATED: CPT | Performed by: INTERNAL MEDICINE

## 2020-01-09 PROCEDURE — 36415 COLL VENOUS BLD VENIPUNCTURE: CPT | Performed by: INTERNAL MEDICINE

## 2020-01-09 PROCEDURE — 80048 BASIC METABOLIC PNL TOTAL CA: CPT | Performed by: INTERNAL MEDICINE

## 2020-01-09 RX ORDER — POTASSIUM CHLORIDE 1.5 G/1.58G
20 POWDER, FOR SOLUTION ORAL 3 TIMES DAILY
Qty: 270 PACKET | Refills: 3 | Status: SHIPPED | OUTPATIENT
Start: 2020-01-09 | End: 2020-01-15

## 2020-01-09 ASSESSMENT — MIFFLIN-ST. JEOR: SCORE: 2108.79

## 2020-01-09 NOTE — PROGRESS NOTES
Subjective     Erich Craven is a 79 year old male who presents to clinic today for the following health issues:    HPI   Patient presents to clinic for a follow up of cold-like sx's which have persisted for 2 weeks. The pt reports that his fatigue, SOB, and dizziness has improved since LOV. The pt states that he thought he should only be taking 1 tablet of his metolazone daily.      Patient Active Problem List   Diagnosis     Morbid obesity (H)     PE (pulmonary thromboembolism) (H)     HTN (hypertension)     Chronic diastolic heart failure (H)     Dyspnea     Fatigue     RIN (obstructive sleep apnea)     COPD (chronic obstructive pulmonary disease) (H)     Hypothyroidism     Paroxysmal A-fib (H)     HLD (hyperlipidemia)     Lymphedema     (HFpEF) heart failure with preserved ejection fraction (H)     Essential hypertension     Cardiac pacemaker in situ     DVT (deep venous thrombosis) (H)     CAD (coronary artery disease)     Obesity, Class III, BMI 40-49.9 (morbid obesity) (H)     Muscle tension headache     Heart failure with preserved ejection fraction, unspecified HF chronicity (H)     Status post coronary angiogram     Past Surgical History:   Procedure Laterality Date     cardiac stenting       CV RIGHT HEART CATH N/A 11/5/2019    Procedure: Right Heart Cath;  Surgeon: Roberto Hernandez MD;  Location:  HEART CARDI[facility] CATH LAB     ENT SURGERY      tonsillectomy     IMPLANT PACEMAKER       ORTHOPEDIC SURGERY Right 2012    knee replacement       Social History     Tobacco Use     Smoking status: Former Smoker     Packs/day: 0.50     Years: 25.00     Pack years: 12.50     Smokeless tobacco: Never Used     Tobacco comment: quit in 1989   Substance Use Topics     Alcohol use: No     Frequency: Never     Comment: quit in 1989     Family History   Problem Relation Age of Onset     Hypertension Father          Current Outpatient Medications   Medication Sig Dispense Refill     Acetylcarnitine HCl (ACETYL  L-CARNITINE) 500 MG CAPS Take 1 capsule by mouth 2 times daily       albuterol (PROAIR HFA/PROVENTIL HFA/VENTOLIN HFA) 108 (90 Base) MCG/ACT inhaler Inhale 2 puffs into the lungs every 4 hours as needed for shortness of breath / dyspnea or wheezing 1 Inhaler 0     aspirin (ASA) 81 MG chewable tablet Take 1 tablet by mouth daily       atorvastatin (LIPITOR) 40 MG tablet Take 1 tablet (40 mg) by mouth every evening 90 tablet 3     carvedilol (COREG) 12.5 MG tablet Take 12.5 mg by mouth 2 times daily       clonazePAM (KLONOPIN) 0.5 MG tablet TAKE 1 TABLET BY MOUTH TWICE A DAY AS NEEDED 60 tablet 0     co-enzyme Q-10 100 MG CAPS capsule Take 100 mg by mouth daily       diltiazem ER COATED BEADS (CARDIZEM CD/CARTIA XT) 300 MG 24 hr capsule Take 1 capsule (300 mg) by mouth daily 90 capsule 0     furosemide (LASIX) 20 MG tablet TAKE 3 TABLETS BY MOUTH TWICE DAILY. 540 tablet 3     isosorbide mononitrate (IMDUR) 30 MG 24 hr tablet Take 1 tablet (30 mg) by mouth daily 90 tablet 3     levothyroxine (SYNTHROID/LEVOTHROID) 175 MCG tablet Take 1 tablet by mouth daily Additional 1/2 tablet on Sundays       lisinopril (PRINIVIL/ZESTRIL) 40 MG tablet Take 40 mg by mouth daily       metolazone (ZAROXOLYN) 2.5 MG tablet Take 1 tablet (2.5 mg) by mouth Every Mon, Wed, Fri Morning On Thursday 36 tablet 3     Multiple vitamin TABS Take 1 tablet by mouth daily       potassium chloride (KLOR-CON) 20 MEQ packet Take 20 mEq by mouth 3 times daily 270 packet 3     potassium chloride ER (K-TAB/KLOR-CON) 10 MEQ CR tablet Take 1 tablet (10 mEq) by mouth 2 times daily 90 tablet 3     tamsulosin (FLOMAX) 0.4 MG capsule Take 1 capsule (0.4 mg) by mouth daily 90 capsule 3     Taurine 500 MG CAPS Take 500 mg by mouth 2 times daily       venlafaxine (EFFEXOR) 37.5 MG tablet Take 37.5 mg by mouth 2 times daily       warfarin ANTICOAGULANT (COUMADIN) 5 MG tablet TAKE 1-1.5 TABLETS (5-7.5 MG) BY MOUTH DAILY AS DIRECTED BY INR CLINIC 135 tablet 0      Allergies   Allergen Reactions     No Known Allergies        Reviewed and updated as needed this visit by Provider         Review of Systems   ROS COMP: POSITIVE for nocturia x3 Constitutional, HEENT, cardiovascular, pulmonary, and gi systems are negative, except as otherwise noted.    This document serves as a record of the services and decisions personally performed and made by Dontrell Gómez MD. It was created on his behalf by Patric Montero, a trained medical scribe. The creation of this document is based on the provider's statements to the medical scribe.  Patric Montero January 9, 2020 2:37 PM          Objective    /81 (BP Location: Left arm, Patient Position: Sitting, Cuff Size: Adult Large)   Pulse 93   Temp 98.3  F (36.8  C) (Oral)   Ht 1.829 m (6')   Wt 136.1 kg (300 lb)   SpO2 93%   BMI 40.69 kg/m    Body mass index is 40.69 kg/m .     Physical Exam   General feeling better, thinks his ankles are skinnier and monitoring his fluids as reported, no weight change  Neck was supple without adenopathy or thyromegaly his carotids were normal without bruits  Chest clear to auscultation and percussion  Cardiovascular S1 and S2 are physiologic without murmurs or gallops  Abdomen bowel sounds were normal.  There is no palpable mass or organomegaly  Extremities nontender with less intense brawny 2+ pretibial edema  Pulses pedal pulses are as described otherwise his pulses are bilaterally symmetrical throughout without bruits  Skin without significant abnormality  +HJR       Diagnostic Test Results:  Labs reviewed in Epic  Results for orders placed or performed in visit on 01/09/20 (from the past 24 hour(s))   CBC with platelets   Result Value Ref Range    WBC 12.4 (H) 4.0 - 11.0 10e9/L    RBC Count 4.44 4.4 - 5.9 10e12/L    Hemoglobin 14.3 13.3 - 17.7 g/dL    Hematocrit 43.6 40.0 - 53.0 %    MCV 98 78 - 100 fl    MCH 32.2 26.5 - 33.0 pg    MCHC 32.8 31.5 - 36.5 g/dL    RDW 15.8 (H) 10.0 - 15.0 %     Platelet Count 214 150 - 450 10e9/L           Assessment & Plan     Chronic diastolic heart failure (H)  Intensified his diuretics to what intended during LOV. Taking metolazone 5 mg daily along with Lasix and increase K+ supplementation TID   - Basic metabolic panel  - CBC with platelets    PE (pulmonary thromboembolism) (H)  No change in meds     Paroxysmal atrial fibrillation (H)  Well controlled with current therapies. INR f/u     Simple chronic bronchitis (H)  Resolving     Morbid obesity (H)      Deep vein thrombosis (DVT) of proximal lower extremity, unspecified chronicity, unspecified laterality (H)      Adjustment disorder with depressed mood      RIN (obstructive sleep apnea)      Hypothyroidism, unspecified type      Essential hypertension  Well controlled with current therapies     Hypokalemia  See above   - potassium chloride (KLOR-CON) 20 MEQ packet  Dispense: 270 packet; Refill: 3           FUTURE APPOINTMENTS:       - Follow-up visit    Return in about 6 days (around 1/15/2020) for Follow Up.    The information in this document, created by the medical scribe for me, accurately reflects the services I personally performed and the decisions made by me. I have reviewed and approved this document for accuracy prior to leaving the patient care area.  January 9, 2020 3:36 PM  30 minutes spent during this encounter with greater than 50% of the time spent in counseling and coordinating care  Dontrell Gómez MD  [facility]

## 2020-01-10 LAB
ANION GAP SERPL CALCULATED.3IONS-SCNC: 7 MMOL/L (ref 3–14)
BUN SERPL-MCNC: 24 MG/DL (ref 7–30)
CALCIUM SERPL-MCNC: 9.2 MG/DL (ref 8.5–10.1)
CHLORIDE SERPL-SCNC: 94 MMOL/L (ref 94–109)
CO2 SERPL-SCNC: 34 MMOL/L (ref 20–32)
CREAT SERPL-MCNC: 0.92 MG/DL (ref 0.66–1.25)
GFR SERPL CREATININE-BSD FRML MDRD: 78 ML/MIN/{1.73_M2}
GLUCOSE SERPL-MCNC: 101 MG/DL (ref 70–99)
POTASSIUM SERPL-SCNC: 2.9 MMOL/L (ref 3.4–5.3)
SODIUM SERPL-SCNC: 135 MMOL/L (ref 133–144)

## 2020-01-15 ENCOUNTER — OFFICE VISIT (OUTPATIENT)
Dept: FAMILY MEDICINE | Facility: CLINIC | Age: 80
End: 2020-01-15
Payer: MEDICARE

## 2020-01-15 ENCOUNTER — ANTICOAGULATION THERAPY VISIT (OUTPATIENT)
Dept: NURSING | Facility: CLINIC | Age: 80
End: 2020-01-15
Payer: MEDICARE

## 2020-01-15 VITALS
SYSTOLIC BLOOD PRESSURE: 129 MMHG | TEMPERATURE: 98.4 F | BODY MASS INDEX: 40.28 KG/M2 | DIASTOLIC BLOOD PRESSURE: 76 MMHG | WEIGHT: 297 LBS | HEART RATE: 78 BPM | OXYGEN SATURATION: 91 %

## 2020-01-15 DIAGNOSIS — I25.10 CORONARY ARTERY DISEASE INVOLVING NATIVE HEART WITHOUT ANGINA PECTORIS, UNSPECIFIED VESSEL OR LESION TYPE: ICD-10-CM

## 2020-01-15 DIAGNOSIS — J41.0 SIMPLE CHRONIC BRONCHITIS (H): ICD-10-CM

## 2020-01-15 DIAGNOSIS — E66.01 MORBID OBESITY (H): ICD-10-CM

## 2020-01-15 DIAGNOSIS — I26.99 PE (PULMONARY THROMBOEMBOLISM) (H): ICD-10-CM

## 2020-01-15 DIAGNOSIS — I10 ESSENTIAL HYPERTENSION: ICD-10-CM

## 2020-01-15 DIAGNOSIS — E03.9 HYPOTHYROIDISM, UNSPECIFIED TYPE: ICD-10-CM

## 2020-01-15 DIAGNOSIS — E78.49 OTHER HYPERLIPIDEMIA: ICD-10-CM

## 2020-01-15 DIAGNOSIS — I50.33 ACUTE ON CHRONIC HEART FAILURE WITH PRESERVED EJECTION FRACTION (H): ICD-10-CM

## 2020-01-15 DIAGNOSIS — I48.0 PAROXYSMAL A-FIB (H): Primary | ICD-10-CM

## 2020-01-15 DIAGNOSIS — I50.32 CHRONIC DIASTOLIC HEART FAILURE (H): ICD-10-CM

## 2020-01-15 DIAGNOSIS — I48.91 ATRIAL FIBRILLATION (H): ICD-10-CM

## 2020-01-15 LAB
ANION GAP SERPL CALCULATED.3IONS-SCNC: 8 MMOL/L (ref 3–14)
BUN SERPL-MCNC: 34 MG/DL (ref 7–30)
CALCIUM SERPL-MCNC: 9.4 MG/DL (ref 8.5–10.1)
CHLORIDE SERPL-SCNC: 93 MMOL/L (ref 94–109)
CO2 SERPL-SCNC: 32 MMOL/L (ref 20–32)
CREAT SERPL-MCNC: 1.05 MG/DL (ref 0.66–1.25)
ERYTHROCYTE [DISTWIDTH] IN BLOOD BY AUTOMATED COUNT: 15.8 % (ref 10–15)
GFR SERPL CREATININE-BSD FRML MDRD: 66 ML/MIN/{1.73_M2}
GLUCOSE SERPL-MCNC: 158 MG/DL (ref 70–99)
HCT VFR BLD AUTO: 42.5 % (ref 40–53)
HGB BLD-MCNC: 13.9 G/DL (ref 13.3–17.7)
INR POINT OF CARE: 3.9 (ref 0.86–1.14)
MCH RBC QN AUTO: 32 PG (ref 26.5–33)
MCHC RBC AUTO-ENTMCNC: 32.7 G/DL (ref 31.5–36.5)
MCV RBC AUTO: 98 FL (ref 78–100)
PLATELET # BLD AUTO: 238 10E9/L (ref 150–450)
POTASSIUM SERPL-SCNC: 2.8 MMOL/L (ref 3.4–5.3)
RBC # BLD AUTO: 4.34 10E12/L (ref 4.4–5.9)
SODIUM SERPL-SCNC: 133 MMOL/L (ref 133–144)
WBC # BLD AUTO: 11.5 10E9/L (ref 4–11)

## 2020-01-15 PROCEDURE — 36416 COLLJ CAPILLARY BLOOD SPEC: CPT

## 2020-01-15 PROCEDURE — 85027 COMPLETE CBC AUTOMATED: CPT | Performed by: INTERNAL MEDICINE

## 2020-01-15 PROCEDURE — 80048 BASIC METABOLIC PNL TOTAL CA: CPT | Performed by: INTERNAL MEDICINE

## 2020-01-15 PROCEDURE — 99207 ZZC NO CHARGE NURSE ONLY: CPT

## 2020-01-15 PROCEDURE — 85610 PROTHROMBIN TIME: CPT | Mod: QW

## 2020-01-15 PROCEDURE — 99214 OFFICE O/P EST MOD 30 MIN: CPT | Performed by: INTERNAL MEDICINE

## 2020-01-15 RX ORDER — METOLAZONE 2.5 MG/1
2.5 TABLET ORAL 2 TIMES DAILY
Qty: 180 TABLET | Refills: 3 | Status: SHIPPED | OUTPATIENT
Start: 2020-01-15 | End: 2021-02-04

## 2020-01-15 RX ORDER — WARFARIN SODIUM 5 MG/1
TABLET ORAL
Qty: 100 TABLET | Refills: 0 | Status: SHIPPED | OUTPATIENT
Start: 2020-01-15 | End: 2020-05-12

## 2020-01-15 NOTE — PROGRESS NOTES
ANTICOAGULATION FOLLOW-UP CLINIC VISIT    Patient Name:  Erich Craven  Date:  1/15/2020  Contact Type:  Face to Face    SUBJECTIVE:  Patient Findings     Positives:   Change in health    Comments:   Pt has been seeing  for c/o fatigue. Pt reports that he is starting to feel better now. Pt's INR 3.9 again since last INR visit. Maintenance dose reduced for now.        Clinical Outcomes     Comments:   Pt has been seeing  for c/o fatigue. Pt reports that he is starting to feel better now. Pt's INR 3.9 again since last INR visit. Maintenance dose reduced for now.           OBJECTIVE    INR Protime   Date Value Ref Range Status   01/15/2020 3.9 (A) 0.86 - 1.14 Final       ASSESSMENT / PLAN  INR assessment SUPRA    Recheck INR In: 10 DAYS    INR Location Clinic      Anticoagulation Summary  As of 1/15/2020    INR goal:   2.0-3.0   TTR:   68.7 % (9.2 mo)   INR used for dosing:   3.9! (1/15/2020)   Warfarin maintenance plan:   7.5 mg (5 mg x 1.5) every Wed; 5 mg (5 mg x 1) all other days   Full warfarin instructions:   1/16: Hold; 1/17: 2.5 mg; Otherwise 7.5 mg every Wed; 5 mg all other days   Weekly warfarin total:   37.5 mg   Plan last modified:   Delia Jang RN (1/15/2020)   Next INR check:   1/24/2020   Target end date:            Anticoagulation Episode Summary     INR check location:       Preferred lab:       Send INR reminders to:   SERENITY BECERRA    Comments:         Anticoagulation Care Providers     Provider Role Specialty Phone number    Dontrell Gómez MD Referring Internal Medicine [phone]            See the Encounter Report to view Anticoagulation Flowsheet and Dosing Calendar (Go to Encounters tab in chart review, and find the Anticoagulation Therapy Visit)    Pt INR is 3.9 today. See findings. Pt already took 7.5 mg of warfarin today. Pt advised to HOLD warfarin tomorrow 1/16/20 then take 2.5 mg on Friday 1/17/20. Maintenance dose reduced by 11%. For now, pt advised to take  7.5 mg on Wednesdays and 5 mg all the other days. Recheck INR on Friday 1/24/20. Jaun aware if signs of clotting (pain, tenderness, swelling, color change in leg or arm, SOB) and bleeding occur (blood in stool, urine, large bruising, bleeding gums, nosebleeds) to have INR check sooner. If sx severe report to ER or concerned for stroke call 911. If general questions or concerns arise, call clinic.         Delia Jang RN

## 2020-01-15 NOTE — PROGRESS NOTES
Subjective     Erich Craven is a 79 year old male who presents to clinic today for the following health issues:    HPI   Chief Complaint   Patient presents with     RECHECK     Fatigue, pt states he is feeling better   Pt with a hx of CHF with associated venous stasis changes presents to the clinic for a f/u exam. The pt reports feeling better. He denies complications with breathing and his ANNIKA is decreasing.     Patient Active Problem List   Diagnosis     Morbid obesity (H)     PE (pulmonary thromboembolism) (H)     HTN (hypertension)     Chronic diastolic heart failure (H)     Dyspnea     Fatigue     RIN (obstructive sleep apnea)     COPD (chronic obstructive pulmonary disease) (H)     Hypothyroidism     Paroxysmal A-fib (H)     HLD (hyperlipidemia)     Lymphedema     (HFpEF) heart failure with preserved ejection fraction (H)     Essential hypertension     Cardiac pacemaker in situ     DVT (deep venous thrombosis) (H)     CAD (coronary artery disease)     Obesity, Class III, BMI 40-49.9 (morbid obesity) (H)     Muscle tension headache     Heart failure with preserved ejection fraction, unspecified HF chronicity (H)     Status post coronary angiogram     Past Surgical History:   Procedure Laterality Date     cardiac stenting       CV RIGHT HEART CATH N/A 11/5/2019    Procedure: Right Heart Cath;  Surgeon: Roberto Hernandez MD;  Location:  HEART CARDIAC CATH LAB     ENT SURGERY      tonsillectomy     IMPLANT PACEMAKER       ORTHOPEDIC SURGERY Right 2012    knee replacement       Social History     Tobacco Use     Smoking status: Former Smoker     Packs/day: 0.50     Years: 25.00     Pack years: 12.50     Smokeless tobacco: Never Used     Tobacco comment: quit in 1989   Substance Use Topics     Alcohol use: No     Frequency: Never     Comment: quit in 1989     Family History   Problem Relation Age of Onset     Hypertension Father          Current Outpatient Medications   Medication Sig Dispense Refill      metolazone (ZAROXOLYN) 2.5 MG tablet Take 1 tablet (2.5 mg) by mouth 2 times daily 180 tablet 3     potassium chloride ER (K-TAB/KLOR-CON) 10 MEQ CR tablet Take 1 tablet (10 mEq) by mouth 2 times daily (Patient taking differently: Take 10 mEq by mouth 3 times daily ) 90 tablet 3     Acetylcarnitine HCl (ACETYL L-CARNITINE) 500 MG CAPS Take 1 capsule by mouth 2 times daily       albuterol (PROAIR HFA/PROVENTIL HFA/VENTOLIN HFA) 108 (90 Base) MCG/ACT inhaler Inhale 2 puffs into the lungs every 4 hours as needed for shortness of breath / dyspnea or wheezing 1 Inhaler 0     aspirin (ASA) 81 MG chewable tablet Take 1 tablet by mouth daily       atorvastatin (LIPITOR) 40 MG tablet Take 1 tablet (40 mg) by mouth every evening 90 tablet 3     carvedilol (COREG) 12.5 MG tablet Take 12.5 mg by mouth 2 times daily       clonazePAM (KLONOPIN) 0.5 MG tablet TAKE 1 TABLET BY MOUTH TWICE A DAY AS NEEDED 60 tablet 0     co-enzyme Q-10 100 MG CAPS capsule Take 100 mg by mouth daily       diltiazem ER COATED BEADS (CARDIZEM CD/CARTIA XT) 300 MG 24 hr capsule Take 1 capsule (300 mg) by mouth daily 90 capsule 0     furosemide (LASIX) 20 MG tablet TAKE 3 TABLETS BY MOUTH TWICE DAILY. 540 tablet 3     isosorbide mononitrate (IMDUR) 30 MG 24 hr tablet Take 1 tablet (30 mg) by mouth daily 90 tablet 3     levothyroxine (SYNTHROID/LEVOTHROID) 175 MCG tablet Take 1 tablet by mouth daily Additional 1/2 tablet on Sundays       lisinopril (PRINIVIL/ZESTRIL) 40 MG tablet Take 40 mg by mouth daily       Multiple vitamin TABS Take 1 tablet by mouth daily       tamsulosin (FLOMAX) 0.4 MG capsule Take 1 capsule (0.4 mg) by mouth daily 90 capsule 3     Taurine 500 MG CAPS Take 500 mg by mouth 2 times daily       venlafaxine (EFFEXOR) 37.5 MG tablet Take 37.5 mg by mouth 2 times daily       warfarin ANTICOAGULANT (COUMADIN) 5 MG tablet TAKE 1-1.5 TABLETS (5-7.5 MG) BY MOUTH DAILY AS DIRECTED BY INR CLINIC 135 tablet 0     Allergies   Allergen Reactions      No Known Allergies        Reviewed and updated as needed this visit by Provider         Review of Systems   ROS COMP: Constitutional, HEENT, cardiovascular, pulmonary, gi and gu systems are negative, except as otherwise noted.    This document serves as a record of the services and decisions personally performed and made by Dontrell Gómez MD. It was created on his behalf by Patric Montero, a trained medical scribe. The creation of this document is based on the provider's statements to the medical scribe.  Patric Montero January 15, 2020 10:03 AM          Objective    /76 (BP Location: Left arm, Cuff Size: Adult Large)   Pulse 78   Temp 98.4  F (36.9  C) (Oral)   Wt 134.7 kg (297 lb)   SpO2 91%   BMI 40.28 kg/m    Body mass index is 40.28 kg/m .     Physical Exam   Neck was supple without adenopathy or thyromegaly his carotids were normal without bruits  Chest clear to auscultation and percussion  Cardiovascular S1 and S2 are physiologic without murmurs or gallops  Abdomen bowel sounds were normal.  There is no palpable mass or organomegaly  Extremities nontender with 1-2+ pretibial edema with chronic venous stasis changes   Pulses pedal pulses are as described otherwise his pulses are bilaterally symmetrical throughout without bruits  Skin without significant abnormality  Neck veins were negative       Diagnostic Test Results:  Labs reviewed in Epic  Results for orders placed or performed in visit on 01/15/20 (from the past 24 hour(s))   INR point of care   Result Value Ref Range    INR Protime 3.9 (A) 0.86 - 1.14           Assessment & Plan     Paroxysmal A-fib (H)  F/u on labs and the pt will receive a report concerning the results once they are made available.   - CBC with platelets  - Basic metabolic panel    Acute on chronic heart failure with preserved ejection fraction (H)    - CBC with platelets  - Basic metabolic panel    Morbid obesity (H)  Highly encouraged more exercise.     PE (pulmonary  thromboembolism) (H)      Simple chronic bronchitis (H)      Other hyperlipidemia      Essential hypertension  Well controlled with current therapies   - CBC with platelets  - Basic metabolic panel    Hypothyroidism, unspecified type      Coronary artery disease involving native heart without angina pectoris, unspecified vessel or lesion type      Chronic diastolic heart failure (H)  Highly encouraged more exercise/activity.   - metolazone (ZAROXOLYN) 2.5 MG tablet  Dispense: 180 tablet; Refill: 3           FUTURE APPOINTMENTS:       - Follow-up visit in 1 month    Return in about 1 month (around 2/15/2020) for Follow Up.    The information in this document, created by the medical scribe for me, accurately reflects the services I personally performed and the decisions made by me. I have reviewed and approved this document for accuracy prior to leaving the patient care area.  January 15, 2020 10:25 AM  20 minutes spent during this encounter with greater than 50% of the time spent in coordinating and counseling care  Dontrell Gómez MD  [facility]

## 2020-01-15 NOTE — TELEPHONE ENCOUNTER
"warfarin ANTICOAGULANT (COUMADIN) 5 MG tablet    Last Written Prescription Date:  10/9/2019  Last Fill Quantity: 135,  # refills: 0   Last office visit: No previous visit found with prescribing provider:  N/A   Future Office Visit:  Unknown     Requested Prescriptions   Pending Prescriptions Disp Refills     warfarin ANTICOAGULANT (COUMADIN) 5 MG tablet [Pharmacy Med Name: WARFARIN SODIUM 5 MG TABLET] 135 tablet 0     Sig: TAKE 1-1.5 TABLETS (5-7.5 MG) BY MOUTH DAILY AS DIRECTED BY INR CLINIC       Vitamin K Antagonists Failed - 1/15/2020  9:35 AM        Failed - INR is within goal in the past 6 weeks     Confirm INR is within goal in the past 6 weeks.     Recent Labs   Lab Test 01/15/20   INR 3.9*                       Passed - Recent (12 mo) or future (30 days) visit within the authorizing provider's specialty     Patient has had an office visit with the authorizing provider or a provider within the authorizing providers department within the previous 12 mos or has a future within next 30 days. See \"Patient Info\" tab in inbasket, or \"Choose Columns\" in Meds & Orders section of the refill encounter.              Passed - Medication is active on med list        Passed - Patient is 18 years of age or older          "

## 2020-01-22 ENCOUNTER — OFFICE VISIT (OUTPATIENT)
Dept: SLEEP MEDICINE | Facility: CLINIC | Age: 80
End: 2020-01-22
Payer: MEDICARE

## 2020-01-22 ENCOUNTER — HOSPITAL ENCOUNTER (EMERGENCY)
Facility: CLINIC | Age: 80
Discharge: HOME | End: 2020-01-22
Attending: EMERGENCY MEDICINE | Admitting: EMERGENCY MEDICINE
Payer: MEDICARE

## 2020-01-22 ENCOUNTER — TELEPHONE (OUTPATIENT)
Dept: FAMILY MEDICINE | Facility: CLINIC | Age: 80
End: 2020-01-22

## 2020-01-22 VITALS
HEIGHT: 72 IN | OXYGEN SATURATION: 93 % | BODY MASS INDEX: 40.23 KG/M2 | TEMPERATURE: 97.9 F | SYSTOLIC BLOOD PRESSURE: 143 MMHG | RESPIRATION RATE: 13 BRPM | WEIGHT: 297 LBS | DIASTOLIC BLOOD PRESSURE: 109 MMHG | HEART RATE: 91 BPM

## 2020-01-22 VITALS
WEIGHT: 295 LBS | HEIGHT: 72 IN | DIASTOLIC BLOOD PRESSURE: 79 MMHG | BODY MASS INDEX: 39.96 KG/M2 | OXYGEN SATURATION: 90 % | HEART RATE: 96 BPM | RESPIRATION RATE: 16 BRPM | SYSTOLIC BLOOD PRESSURE: 134 MMHG

## 2020-01-22 DIAGNOSIS — J41.0 SIMPLE CHRONIC BRONCHITIS (H): ICD-10-CM

## 2020-01-22 DIAGNOSIS — G47.33 OSA (OBSTRUCTIVE SLEEP APNEA): Primary | ICD-10-CM

## 2020-01-22 DIAGNOSIS — I50.32 CHRONIC DIASTOLIC HEART FAILURE (H): ICD-10-CM

## 2020-01-22 DIAGNOSIS — R42 LIGHTHEADEDNESS: ICD-10-CM

## 2020-01-22 DIAGNOSIS — E87.6 HYPOKALEMIA: ICD-10-CM

## 2020-01-22 DIAGNOSIS — R09.02 HYPOXEMIA: ICD-10-CM

## 2020-01-22 DIAGNOSIS — I50.33 ACUTE ON CHRONIC HEART FAILURE WITH PRESERVED EJECTION FRACTION (H): ICD-10-CM

## 2020-01-22 LAB
ALBUMIN SERPL-MCNC: 3.6 G/DL (ref 3.4–5)
ALP SERPL-CCNC: 112 U/L (ref 40–150)
ALT SERPL W P-5'-P-CCNC: 45 U/L (ref 0–70)
ANION GAP SERPL CALCULATED.3IONS-SCNC: 6 MMOL/L (ref 3–14)
AST SERPL W P-5'-P-CCNC: 25 U/L (ref 0–45)
BASOPHILS # BLD AUTO: 0 10E9/L (ref 0–0.2)
BASOPHILS NFR BLD AUTO: 0.3 %
BILIRUB SERPL-MCNC: 0.5 MG/DL (ref 0.2–1.3)
BUN SERPL-MCNC: 37 MG/DL (ref 7–30)
CALCIUM SERPL-MCNC: 9.1 MG/DL (ref 8.5–10.1)
CHLORIDE SERPL-SCNC: 94 MMOL/L (ref 94–109)
CO2 SERPL-SCNC: 34 MMOL/L (ref 20–32)
CREAT SERPL-MCNC: 1.05 MG/DL (ref 0.66–1.25)
DIFFERENTIAL METHOD BLD: ABNORMAL
EOSINOPHIL # BLD AUTO: 0.1 10E9/L (ref 0–0.7)
EOSINOPHIL NFR BLD AUTO: 0.9 %
ERYTHROCYTE [DISTWIDTH] IN BLOOD BY AUTOMATED COUNT: 15.5 % (ref 10–15)
GFR SERPL CREATININE-BSD FRML MDRD: 66 ML/MIN/{1.73_M2}
GLUCOSE SERPL-MCNC: 127 MG/DL (ref 70–99)
HCT VFR BLD AUTO: 40 % (ref 40–53)
HGB BLD-MCNC: 13.3 G/DL (ref 13.3–17.7)
IMM GRANULOCYTES # BLD: 0.1 10E9/L (ref 0–0.4)
IMM GRANULOCYTES NFR BLD: 0.8 %
INR PPP: 2.41 (ref 0.86–1.14)
INTERPRETATION ECG - MUSE: NORMAL
LYMPHOCYTES # BLD AUTO: 2.5 10E9/L (ref 0.8–5.3)
LYMPHOCYTES NFR BLD AUTO: 20.5 %
MCH RBC QN AUTO: 31.7 PG (ref 26.5–33)
MCHC RBC AUTO-ENTMCNC: 33.3 G/DL (ref 31.5–36.5)
MCV RBC AUTO: 96 FL (ref 78–100)
MONOCYTES # BLD AUTO: 0.8 10E9/L (ref 0–1.3)
MONOCYTES NFR BLD AUTO: 6.5 %
NEUTROPHILS # BLD AUTO: 8.5 10E9/L (ref 1.6–8.3)
NEUTROPHILS NFR BLD AUTO: 71 %
NRBC # BLD AUTO: 0 10*3/UL
NRBC BLD AUTO-RTO: 0 /100
PLATELET # BLD AUTO: 207 10E9/L (ref 150–450)
POTASSIUM SERPL-SCNC: 2.7 MMOL/L (ref 3.4–5.3)
PROT SERPL-MCNC: 7.4 G/DL (ref 6.8–8.8)
RBC # BLD AUTO: 4.19 10E12/L (ref 4.4–5.9)
SODIUM SERPL-SCNC: 134 MMOL/L (ref 133–144)
TROPONIN I SERPL-MCNC: <0.015 UG/L (ref 0–0.04)
WBC # BLD AUTO: 12 10E9/L (ref 4–11)

## 2020-01-22 PROCEDURE — 85025 COMPLETE CBC W/AUTO DIFF WBC: CPT | Performed by: EMERGENCY MEDICINE

## 2020-01-22 PROCEDURE — 93005 ELECTROCARDIOGRAM TRACING: CPT

## 2020-01-22 PROCEDURE — 84484 ASSAY OF TROPONIN QUANT: CPT | Performed by: EMERGENCY MEDICINE

## 2020-01-22 PROCEDURE — 99214 OFFICE O/P EST MOD 30 MIN: CPT | Performed by: PHYSICIAN ASSISTANT

## 2020-01-22 PROCEDURE — 25000132 ZZH RX MED GY IP 250 OP 250 PS 637: Mod: GY | Performed by: EMERGENCY MEDICINE

## 2020-01-22 PROCEDURE — 80053 COMPREHEN METABOLIC PANEL: CPT | Performed by: EMERGENCY MEDICINE

## 2020-01-22 PROCEDURE — 99284 EMERGENCY DEPT VISIT MOD MDM: CPT

## 2020-01-22 PROCEDURE — 85610 PROTHROMBIN TIME: CPT | Performed by: EMERGENCY MEDICINE

## 2020-01-22 RX ORDER — POTASSIUM CHLORIDE 1500 MG/1
40 TABLET, EXTENDED RELEASE ORAL ONCE
Status: COMPLETED | OUTPATIENT
Start: 2020-01-22 | End: 2020-01-22

## 2020-01-22 RX ADMIN — POTASSIUM CHLORIDE 40 MEQ: 1500 TABLET, EXTENDED RELEASE ORAL at 14:34

## 2020-01-22 ASSESSMENT — ENCOUNTER SYMPTOMS
DIZZINESS: 1
PALPITATIONS: 1
LIGHT-HEADEDNESS: 1

## 2020-01-22 ASSESSMENT — MIFFLIN-ST. JEOR
SCORE: 2086.11
SCORE: 2095.18

## 2020-01-22 NOTE — ED AVS SNAPSHOT
Emergency Department  [address] [place] [id number]  Phone:  [phone]  Fax:  [phone]                                    Erich Craven   MRN: [id number]    Department:   Emergency Department   Date of Visit:  1/22/2020           After Visit Summary Signature Page    I have received my discharge instructions, and my questions have been answered. I have discussed any challenges I see with this plan with the nurse or doctor.    ..........................................................................................................................................  Patient/Patient Representative Signature      ..........................................................................................................................................  Patient Representative Print Name and Relationship to Patient    ..................................................               ................................................  Date                                   Time    ..........................................................................................................................................  Reviewed by Signature/Title    ...................................................              ..............................................  Date                                               Time          22EPIC Rev 08/18

## 2020-01-22 NOTE — TELEPHONE ENCOUNTER
"PT reports he is having more dizziness, sob, palpitations this past week.  Feeling very up and down fatigue wise, feeling sob worsening at night.      Seem to be getting more dizziness, sob, palpatations at night.  Would have had at least one hard fall yesterday, except someone caught him.  Afraid of falling today and feels like something isn't right.  Denies chest pain but reports \"cringing\" feeling.  Tightness?     Due to falls, recent afib issues, possible chest tightness, advised ED. Pt's son will drive him.  Any worsening sx, call 911.  Pt in agreeement with plan.    Martina Ackerman RN    BP Readings from Last 6 Encounters:   01/15/20 129/76   01/09/20 115/81   01/07/20 110/66   11/18/19 (!) 148/75   11/12/19 (!) 150/66   11/12/19 (!) 150/84       "

## 2020-01-22 NOTE — TELEPHONE ENCOUNTER
Reason for call:  Patient reporting a symptom    Symptom or request: trouble with balance, breathing, heart palpitations    Duration (how long have symptoms been present): getting worse the last couple of days.     Have you been treated for this before? Yes    Additional comments:     Phone Number patient can be reached at:  Home number on file [phone] (home)    Best Time:  any    Can we leave a detailed message on this number:  YES    Call taken on 1/22/2020 at 10:38 AM by Jelly Diaz

## 2020-01-22 NOTE — ED TRIAGE NOTES
"Patient reports seeing MD for \"problems with diuretics.\" States they have increased diuretic dosage and now has been dizzy for the past 2 days. States started with the diuretics for the past 3 weeks. \"They increased the diuretics quite a bit.\" Complains of SOB with exertion. Denies CP.  "

## 2020-01-22 NOTE — PATIENT INSTRUCTIONS
Your BMI is Body mass index is 40.01 kg/m .  Weight management is a personal decision.  If you are interested in exploring weight loss strategies, the following discussion covers the approaches that may be successful. Body mass index (BMI) is one way to tell whether you are at a healthy weight, overweight, or obese. It measures your weight in relation to your height.  A BMI of 18.5 to 24.9 is in the healthy range. A person with a BMI of 25 to 29.9 is considered overweight, and someone with a BMI of 30 or greater is considered obese. More than two-thirds of American adults are considered overweight or obese.  Being overweight or obese increases the risk for further weight gain. Excess weight may lead to heart disease and diabetes.  Creating and following plans for healthy eating and physical activity may help you improve your health.  Weight control is part of healthy lifestyle and includes exercise, emotional health, and healthy eating habits. Careful eating habits lifelong are the mainstay of weight control. Though there are significant health benefits from weight loss, long-term weight loss with diet alone may be very difficult to achieve- studies show long-term success with dietary management in less than 10% of people. Attaining a healthy weight may be especially difficult to achieve in those with severe obesity. In some cases, medications, devices and surgical management might be considered.  What can you do?  If you are overweight or obese and are interested in methods for weight loss, you should discuss this with your provider.     Consider reducing daily calorie intake by 500 calories.     Keep a food journal.     Avoiding skipping meals, consider cutting portions instead.    Diet combined with exercise helps maintain muscle while optimizing fat loss. Strength training is particularly important for building and maintaining muscle mass. Exercise helps reduce stress, increase energy, and improves fitness.  Increasing exercise without diet control, however, may not burn enough calories to loose weight.       Start walking three days a week 10-20 minutes at a time    Work towards walking thirty minutes five days a week     Eventually, increase the speed of your walking for 1-2 minutes at time    In addition, we recommend that you review healthy lifestyles and methods for weight loss available through the National Institutes of Health patient information sites:  http://win.niddk.nih.gov/publications/index.htm    And look into health and wellness programs that may be available through your health insurance provider, employer, local community center, or annie club.    Weight management plan: Patient was referred to their PCP to discuss a diet and exercise plan.

## 2020-01-22 NOTE — ED PROVIDER NOTES
History   Chief Complaint:  Dizziness     HPI   Erich Craven is a 79 year old male with history of coronary artery disease , COPD, chronic diastolic congestive heart failure, hypertension, hyperlipidemia, and paroxysmal atrial fibrillation on Coumadin  status post pace maker placement who presents with dizziness. The patient reports he has had balance problems for the past several days stating he feels both lightheaded and uncoordinated. He reports yesterday he almost fell, but has not fallen yet. He also reports that he feels palpitations when he stands. However, here in the ED his symptoms are resolved. The patient also reports he has had an increased dose of his diuretic and potassium lately. He reports that to the best of his knowledge his recent angiogram was normal. No fevers, chills, or symptoms of infection.  No shortness of breath.  No localized weakness or coordination problem. He has no current complaints.    Allergies:  No Known Allergies    Medications:   Albuterol  Aspirin 81 mg   Lipitor   Coreg  Clonazepam   Lasix  Diltiazem  Synthroid  Imdur  Lisinopril  Metolazone  Effexor  Coumadin     Past Medical History:    Heart Failure with preserved ejection fraction   BPH with urinary obstruction   Coronary artery disease   Chronic diastolic heart failure  COPD  DVT  Dyspnea  Essential hypertension  Fatigue  Hyperlipidemia  Hypothyroidism  Lymphedema  Obesity   obstructive sleep apnea  Paroxysmal atrial fibrillation   Pulmonary embolism      Past Surgical History:    Cardiac stenting   Right heart cath  Tonsillectomy  Implant pacemaker  Knee replacement   Angiogram      Family History:    Father: hypertension     Social History:  Smoking Status: former smoker  Smokeless Tobacco: Never Used  Alcohol Use: No  Drug Use: No  PCP: Dontrell Gómez     Review of Systems   Cardiovascular: Positive for palpitations.   Neurological: Positive for dizziness and light-headedness.   All other systems reviewed and are  negative.    Physical Exam     Patient Vitals for the past 24 hrs:   BP Temp Temp src Pulse Resp SpO2 Height Weight   01/22/20 1147 (!) 149/70 97.9  F (36.6  C) Oral 90 16 93 % 1.829 m (6') 134.7 kg (297 lb)       Physical Exam  General: Resting on the gurney, appears somewhat uncomfortable  Head:  The scalp, face, and head appear normal  Mouth/Throat: Mucus membranes are moist  CV:  Regular rate    Normal S1 and S2  No pathological murmur   Resp:  Breath sounds clear and equal bilaterally    Non-labored, no retractions or accessory muscle use    No coarseness    No wheezing   GI:  Abdomen is soft, no rigidity    No tenderness to palpation  MS:  Normal motor assessment of all extremities.    Good capillary refill noted.  Skin:  No rash or lesions noted.  Neuro:   Speech is normal and fluent. No apparent deficit.  Psych: Awake. Alert.  Normal affect.      Appropriate interactions.    Emergency Department Course   ECG:  ECG taken at 1200  Normal sinus rhythm  Normal ECG  Rate 85 bpm. DC interval 206 ms. QRS duration 104 ms. QT/QTc 368/437 ms. P-R-T axes 39 35 26.    Laboratory:  Laboratory findings were communicated with the patient who voiced understanding of the findings.    CBC: WBC 12.0(H), HGB 13.3,   CMP: potassium 2.7(L), carbon dioxide 34(H), glucose 127(H), BUN 37(H) o/w WNL (Creatinine 1.05)  Troponin (Collected 1216): <0.015  INR: 2.41(H)    Interventions:  1433 potassium chloride 40 mEq Oral     Emergency Department Course:  Nursing notes and vitals reviewed.    1200 I performed an exam of the patient as documented above.     IV was inserted and blood was drawn for laboratory testing, results above.     1347 I was informed the patient was able to ambulate to the bathroom without difficulty.     1413 I rechecked the patient. Explained findings to the Patient.    Findings and plan explained to the Patient. Patient discharged home with instructions regarding supportive care, medications, and reasons to  return. The importance of close follow-up was reviewed.     Impression & Plan    Medical Decision Making:  Erich Craven is a 79 year old male who presents for evaluation of dizziness.  Associated symptoms today have included lightheadedness.  The workup here in the emergency room was to evaluate for infections, metabolic, electrolyte, neurologic or cardiovascular causes.  In addition, I do not believe symptoms are due to CVA, TIA, myasthesia gravis, myopathy or acute coronary syndromes and there are no indications at this point for a further workup with a benign history, exam and laboratory tests. Oral potassium given in ED.  They were ambulated in ED and did well.  His symptoms have fully resolved.  I believe supportive outpatient management is indicated; will have them followup with their primary care doctor in 1-2 days for a recheck. Return for any additional symptoms or worsening weakness.       Diagnosis:    ICD-10-CM    1. Lightheadedness R42    2. Hypokalemia E87.6        Disposition:   discharged to home    Scribe Disclosure:  I, Michelle Holley, am serving as a scribe at 11:54 AM on 1/22/2020 to document services personally performed by Kala Matute MD based on my observations and the provider's statements to me.   [facility] EMERGENCY DEPARTMENT       Kala Matute MD  01/22/20 0392

## 2020-01-22 NOTE — PROGRESS NOTES
Sleep Follow-Up Visit:    Date on this visit: 1/22/2020    Erich Craven comes in today for follow-up of his CPAP use for moderate RIN and hypoxemia. He was initially seen at the [facility] for previously diagnosed RIN and waking up feeling exhausted, since he moved to [place] last December.  His medical history is significant for paroxysmal atrial fibrillation, cardiac pacemaker, HFpEF, CAD, HTN, morbid obesity, PE (2012), COPD, hyperlipidemia, hypothyroidism, elevated right diaphragm.     AHI was 18.9/hr (4.9% centrals with a tendency towards periodic breathing), with desaturations down to 74%. He spent 109.6 minutes below 90% SpO2 and 68.6 minutes below 89%. RDI 23.9/hr.  REM RDI 32.7/hr.  Supine RDI 71.1/hr. His non-supine RDI was 18/hr.  Periodic Limb Movement Index 84.9/hour, 17.3/hr were associated with arousals.  CO2 ranged from 42 to 45 mmHg, not suggestive of hypoventilation. His O2 baseline was 87-90% for the first half of the night. One lpm O2 was added to bring his SpO2 to 90% or better.      Titration results:  CPAP titration: CPAP was titrated to 9 cm with elimination of apneas, hypopneas and desaturations. Supine REM was noted at this pressure. BiPAP 9/4 was attempted briefly in REM, but the patient had significant desaturations on BiPAP. His O2 saturation was 89-91%.    He is on auto CPAP 9-15 cm. He frequently hits the ramp to keep the pressure down to 7 cm. The download does not show apnea or even much snoring when the pressure is that low. His SpO2 at the time of rooming today was 90%. When I put the oximeter probe on, it was 91-92%, but as soon as I put the probe on he immediately started breathing more deeply without being prompted. He clearly was used to having low readings and anticipated it being low. It dropped to 86% on room air as soon as he was distracted, breathing his normal rate, and not purposefully hyperventilating.  He has been groggy and light headed and tired  for the last couple of days. He presented here from the ED. Only low potassium was found. They did not check his oxygen. He says his energy level has dropped over the last month. He is short of breath in the middle of the day.    He says he is getting along reasonably well with the new CPAP. He does not notice a great deal of difference. He gets up 3-4 times per night because of the diuretics. He has been hitting the ramp button because he thought he was supposed to. He uses an AirFit F20 mask. He sleeps supine because of shoulder pain, he sleeps a little on his left. He thinks he is sleeping a lot more. He sleeps about an hour in the afternoon, but does not put the CPAP on. He sometimes gets mask leak into his eyes. He is comfortable with the pressures.     The compliance data shows that the patient used the CPAP for 30/30 nights, 100% of nights for >4 hours.  The 90th% pressure is 11.2 cm.  The average time in large leak is 56 sec.  The average nightly usage is 5:36.  The average AHI is 2.1/hr.    He goes to bed around 9-10 PM. He gets up at 6 AM for 30 minutes and goes back to bed until 8 AM. Sometimes he is up in the middle of the night for 1-3 hours. He will read in another room.     Past medical/surgical history, family history, social history, medications and allergies were reviewed.      Problem List:  Patient Active Problem List    Diagnosis Date Noted     Status post coronary angiogram 11/05/2019     Priority: Medium     Heart failure with preserved ejection fraction, unspecified HF chronicity (H) 10/10/2019     Priority: Medium     Added automatically from request for surgery [id number]       Muscle tension headache 05/21/2019     Priority: Medium     PE (pulmonary thromboembolism) (H)      Priority: Medium     HTN (hypertension)      Priority: Medium     Chronic diastolic heart failure (H)      Priority: Medium     Dyspnea      Priority: Medium     Fatigue      Priority: Medium     RIN (obstructive sleep  apnea)      Priority: Medium     COPD (chronic obstructive pulmonary disease) (H)      Priority: Medium     Hypothyroidism      Priority: Medium     Paroxysmal A-fib (H)      Priority: Medium     HLD (hyperlipidemia)      Priority: Medium     Lymphedema      Priority: Medium     (HFpEF) heart failure with preserved ejection fraction (H)      Priority: Medium     Essential hypertension      Priority: Medium     Cardiac pacemaker in situ      Priority: Medium     DVT (deep venous thrombosis) (H)      Priority: Medium     CAD (coronary artery disease)      Priority: Medium     PCI w/ SUMMER in 2014       Obesity, Class III, BMI 40-49.9 (morbid obesity) (H)      Priority: Medium     Morbid obesity (H) 04/14/2019     Priority: Medium        Impression/Plan:    (G47.33) RIN (obstructive sleep apnea)  (primary encounter diagnosis), (R09.02) Hypoxemia, (J41.0) Simple chronic bronchitis (H), (I50.33) Acute on chronic heart failure with preserved ejection fraction (H), (I50.32) Chronic diastolic heart failure (H)  Comment: Mr. Craven presented here from the ED. He has been tired and light headed. They only found low potassium. In clinic today, his oxygen was 86% at rest on room air.  His SpO2 was 93% at his ED presentation, but was not continuously monitored. His CO2 on metabolic panel today was 34. His PSG did not show hypoventilation. His breathing worsened when he was switched to BiPAP, but he was only on that briefly and his EPAP was lowered to 5 cm from a CPAP of 9 cm. He does not have a pulmonologist. His sleep is frequently fragmented by waking up to urinate. Sometimes he will stay up and read for 1-3 hours. His sleep opportunity is from about 7 PM to 8 AM, and he also naps.   Plan: Continue CPAP 9-15 cm. Order was placed for supplemental oxygen 2 lpm in the daytime via nasal cannula and bled into CPAP at night. Oximetry was ordered with CPAP and O2 to verify efficacy.  He was encouraged to get an oximeter over the counter  to spot check his oxygen levels in the daytime.   I told him to go back to the ED to have them reassess his breathing if he starts feeling any worse.   He was encouraged to try to reduce his sleep opportunity by pushing his bedtime later and wake time a little earlier. Use CPAP with oxygen whenever sleeping.     I certify that this patient, Erich Craven has been under my care and that I had a face-to-face encounter that meets face-to-face encounter requirements with this patient on January 22, 2020.    Erich Craven is now in a chronic stable state and continues to require supplemental oxygen due to continued oxygen desaturation.  This patient has been treated in part, or in whole for the following medical condition(s):  Chronic Bronchitis J41.0  Chronic Heart Failure I50  Treatments tried and failed or ruled out to treat hypoxemia include: CPAP (at home) and BiPAP (on PSG). Heart failure medications include; carvedilol, diltiazem, Imdur, furosemide, metolazone. Chronic bronchitis medications include; albuterol,  If portability is ordered, is the patient mobile within the home? yes    He will follow up with me in about 1-2 month(s).     Twenty-five minutes spent with patient, all of which were spent face-to-face counseling, consulting, coordinating plan of care.      Bennett Goltz, PA-C    CC: Dontrell Gómez MD

## 2020-01-23 ENCOUNTER — DOCUMENTATION ONLY (OUTPATIENT)
Dept: SLEEP MEDICINE | Facility: CLINIC | Age: 80
End: 2020-01-23

## 2020-01-23 DIAGNOSIS — I50.32 CHRONIC DIASTOLIC HEART FAILURE (H): ICD-10-CM

## 2020-01-24 ENCOUNTER — ANTICOAGULATION THERAPY VISIT (OUTPATIENT)
Dept: NURSING | Facility: CLINIC | Age: 80
End: 2020-01-24
Payer: MEDICARE

## 2020-01-24 LAB — INR POINT OF CARE: 3 (ref 0.86–1.14)

## 2020-01-24 PROCEDURE — 99207 ZZC NO CHARGE NURSE ONLY: CPT

## 2020-01-24 PROCEDURE — 36416 COLLJ CAPILLARY BLOOD SPEC: CPT

## 2020-01-24 PROCEDURE — 85610 PROTHROMBIN TIME: CPT | Mod: QW

## 2020-01-24 RX ORDER — POTASSIUM CHLORIDE 750 MG/1
10 TABLET, EXTENDED RELEASE ORAL 3 TIMES DAILY
Qty: 270 TABLET | Refills: 1 | Status: SHIPPED | OUTPATIENT
Start: 2020-01-24 | End: 2020-02-03

## 2020-01-24 NOTE — TELEPHONE ENCOUNTER
Patient is requesting  Tablet form of potassium chloride, does not want packets prescribed previously.

## 2020-01-24 NOTE — PROGRESS NOTES
Subjective      Erich Craven is a 79 year old male who presents to clinic today for the following health issues:    HPI   ED/UC Followup:    Facility:  [facility]  Date of visit: 1/22/20  Reason for visit: light headedness  Current Status: better   Pt presents to the clinic for a ED f/u. The pt was admitted to the ED on 01/22/2020 for a near syncopal episode. The pt was given oral K+ and sx's completely resolved upon discharge. Today the pt reports feeling dizzy/lightheaded.     The pt notes that he went and completed a sleep study on 01/22/2020. They recommended that he purchase an oximeter. He reports that his SpO2 levels have been mid-80's. The provider for the sleep study recommended a CPAP machine while sleeping.     The pt complains of a sore throat, occasional feverishness and cough with clear sputum. He notes that he will feel feverishness in bed at night. He also notes experiencing rhinorrhea. The pt states that his tongue feels like sand paper in the AM because his mouth is so dry. The pt reports intermittent low back pain. He states that acetaminophen has improved his pain.     Patient Active Problem List   Diagnosis     Morbid obesity (H)     PE (pulmonary thromboembolism) (H)     HTN (hypertension)     Chronic diastolic heart failure (H)     Dyspnea     Fatigue     RIN (obstructive sleep apnea)     COPD (chronic obstructive pulmonary disease) (H)     Hypothyroidism     Paroxysmal A-fib (H)     HLD (hyperlipidemia)     Lymphedema     (HFpEF) heart failure with preserved ejection fraction (H)     Essential hypertension     Cardiac pacemaker in situ     DVT (deep venous thrombosis) (H)     CAD (coronary artery disease)     Obesity, Class III, BMI 40-49.9 (morbid obesity) (H)     Muscle tension headache     Heart failure with preserved ejection fraction, unspecified HF chronicity (H)     Status post coronary angiogram     Past Surgical History:   Procedure Laterality Date     cardiac stenting       CV RIGHT  HEART CATH N/A 11/5/2019    Procedure: Right Heart Cath;  Surgeon: Roberto Hernandez MD;  Location:  HEART CARDIAC CATH LAB     ENT SURGERY      tonsillectomy     IMPLANT PACEMAKER       ORTHOPEDIC SURGERY Right 2012    knee replacement       Social History     Tobacco Use     Smoking status: Former Smoker     Packs/day: 0.50     Years: 25.00     Pack years: 12.50     Smokeless tobacco: Never Used     Tobacco comment: quit in 1989   Substance Use Topics     Alcohol use: No     Frequency: Never     Comment: quit in 1989     Family History   Problem Relation Age of Onset     Hypertension Father          Current Outpatient Medications   Medication Sig Dispense Refill     Acetylcarnitine HCl (ACETYL L-CARNITINE) 500 MG CAPS Take 1 capsule by mouth 2 times daily       albuterol (PROAIR HFA/PROVENTIL HFA/VENTOLIN HFA) 108 (90 Base) MCG/ACT inhaler Inhale 2 puffs into the lungs every 4 hours as needed for shortness of breath / dyspnea or wheezing 1 Inhaler 0     aspirin (ASA) 81 MG chewable tablet Take 1 tablet by mouth daily       atorvastatin (LIPITOR) 40 MG tablet Take 1 tablet (40 mg) by mouth every evening 90 tablet 3     carvedilol (COREG) 12.5 MG tablet Take 12.5 mg by mouth 2 times daily       clonazePAM (KLONOPIN) 0.5 MG tablet TAKE 1 TABLET BY MOUTH TWICE A DAY AS NEEDED 60 tablet 0     co-enzyme Q-10 100 MG CAPS capsule Take 100 mg by mouth daily       diltiazem ER COATED BEADS (CARDIZEM CD/CARTIA XT) 300 MG 24 hr capsule Take 1 capsule (300 mg) by mouth daily 90 capsule 0     furosemide (LASIX) 20 MG tablet TAKE 3 TABLETS BY MOUTH TWICE DAILY. 540 tablet 3     isosorbide mononitrate (IMDUR) 30 MG 24 hr tablet Take 1 tablet (30 mg) by mouth daily 90 tablet 3     levothyroxine (SYNTHROID/LEVOTHROID) 175 MCG tablet Take 1 tablet by mouth daily Additional 1/2 tablet on Sundays       lisinopril (PRINIVIL/ZESTRIL) 40 MG tablet Take 40 mg by mouth daily       metolazone (ZAROXOLYN) 2.5 MG tablet Take 1 tablet (2.5  mg) by mouth 2 times daily 180 tablet 3     Multiple vitamin TABS Take 1 tablet by mouth daily       order for DME Oxygen 2 Li/min  continuous, at night, NC with bubbler in the day and bled into PAP at night 1 Device 0     potassium chloride ER (K-TAB/KLOR-CON) 10 MEQ CR tablet Take 1 tablet (10 mEq) by mouth 3 times daily 270 tablet 1     tamsulosin (FLOMAX) 0.4 MG capsule Take 1 capsule (0.4 mg) by mouth daily 90 capsule 3     Taurine 500 MG CAPS Take 500 mg by mouth 2 times daily       venlafaxine (EFFEXOR) 37.5 MG tablet Take 37.5 mg by mouth 2 times daily       warfarin ANTICOAGULANT (COUMADIN) 5 MG tablet Take 1.5 tablet (7.5 mg) on  and take 1 tablet (5 mg) all other days or as directed by the INR clinic 100 tablet 0     Allergies   Allergen Reactions     No Known Allergies        Reviewed and updated as needed this visit by Provider         Review of Systems   ROS COMP: Constitutional, HEENT, cardiovascular, pulmonary, gi and gu systems are negative, except as otherwise noted.    This document serves as a record of the services and decisions personally performed and made by Dontrell Gómez MD. It was created on his behalf by Patric Montero, a trained medical scribe. The creation of this document is based on the provider's statements to the medical scribe.  Patric Montero 2020 3:06 PM          Objective    /80 (BP Location: Right arm, Patient Position: Chair, Cuff Size: Adult Large)   Pulse 88   Temp 97  F (36.1  C) (Oral)   Ht 1.829 m (6')   Wt 134.7 kg (297 lb)   SpO2 96%   BMI 40.28 kg/m    Body mass index is 40.28 kg/m .     SpO2 Sittin% / HR: 80's  SpO2 Standin-91% / HR 94     Physical Exam   Anxious morbidly obese white male in no acute distress  HEENT nose and throat are clear   Neck was supple without adenopathy or thyromegaly his carotids were normal without bruits  Chest faint expiratory rhonchi bilaterally, clear to auscultation and percussion  Cardiovascular  S1 and S2 are physiologic without murmurs or gallops  Abdomen obese, bowel sounds were normal.  There is no palpable mass or organomegaly  Extremities nontender with 2++ pretibial edema bilaterally   Pulses pedal pulses are as described otherwise his pulses are bilaterally symmetrical throughout without bruits  Skin without significant abnormality  Neck veins not assessable       Diagnostic Test Results:  Labs reviewed in Epic  No results found for this or any previous visit (from the past 24 hour(s)).        Assessment & Plan     Chronic diastolic heart failure (H)      Paroxysmal A-fib (H)      Deep vein thrombosis (DVT) of distal vein of lower extremity, unspecified chronicity, unspecified laterality (H)      Simple chronic bronchitis (H)      Essential hypertension  Well controlled with current therapies   - Basic metabolic panel  - CBC with platelets    RIN (obstructive sleep apnea)      Hypothyroidism, unspecified type      Coronary artery disease involving native heart without angina pectoris, unspecified vessel or lesion type      Hypokalemia  Rechecking the pt's K+ levels.     Pneumonia due to infectious organism, unspecified laterality, unspecified part of lung  Recommended that the pt check his SpO2 each night and record it when he gets up to urinate. The pt should also be checking the color of his sputum to see if it is changing. Recommended lozenges or sugarless gum to prevent coughing. The pt should also use his albuterol inhaler 2 puffs QID up to 6x daily.  - XR Chest 2 Views               FUTURE APPOINTMENTS:       - Follow-up visit in 1 week     Return in about 1 week (around 2/3/2020) for Follow Up.    The information in this document, created by the medical scribe for me, accurately reflects the services I personally performed and the decisions made by me. I have reviewed and approved this document for accuracy prior to leaving the patient care area.  January 27, 2020 4:43 PM    Dontrell Gómez,  MD  [facility]

## 2020-01-24 NOTE — PROGRESS NOTES
ANTICOAGULATION FOLLOW-UP CLINIC VISIT    Patient Name:  Erich Craven  Date:  1/24/2020  Contact Type:  Face to Face    SUBJECTIVE:  Patient Findings         Clinical Outcomes     Negatives:   Major bleeding event, Thromboembolic event, Anticoagulation-related hospital admission, Anticoagulation-related ED visit, Anticoagulation-related fatality           OBJECTIVE    INR Protime   Date Value Ref Range Status   01/24/2020 3.0 (A) 0.86 - 1.14 Final       ASSESSMENT / PLAN  No question data found.  Anticoagulation Summary  As of 1/24/2020    INR goal:   2.0-3.0   TTR:   68.1 % (9.5 mo)   INR used for dosing:   3.0 (1/24/2020)   Warfarin maintenance plan:   7.5 mg (5 mg x 1.5) every Wed; 5 mg (5 mg x 1) all other days   Full warfarin instructions:   7.5 mg every Wed; 5 mg all other days   Weekly warfarin total:   37.5 mg   No change documented:   Evita Lloyd RN   Plan last modified:   Delia Jang RN (1/15/2020)   Next INR check:   2/7/2020   Target end date:            Anticoagulation Episode Summary     INR check location:       Preferred lab:       Send INR reminders to:   SERENITY BECERRA    Comments:         Anticoagulation Care Providers     Provider Role Specialty Phone number    Dontrell Gómez MD Referring Internal Medicine [phone]            See the Encounter Report to view Anticoagulation Flowsheet and Dosing Calendar (Go to Encounters tab in chart review, and find the Anticoagulation Therapy Visit)    Dosage adjustment made based on physician directed care plan.     Pt denies any changes in diet,health or activity. Jaun is aware if signs of clotting (pain, tenderness, swelling, color change in leg or arm, SOB) and bleeding occur (blood in stool, urine, large bruising, bleeding gums, nosebleeds) to have INR check sooner. If sx severe report to ER or concerned for stroke call 911. If general questions or concerns arise, call clinic.     Was seen in ER 1/22 for dizziness and given K+, no other  changes.  Has follow up with PCP on 1/27/20.        Evita Lloyd RN

## 2020-01-24 NOTE — TELEPHONE ENCOUNTER
"Last Written Prescription Date:  11/13/2019   Last Fill Quantity: 90,  # refills: 3   Last office visit: 1/15/2020 with prescribing provider: Dalia    Future Office Visit:   Next 5 appointments (look out 90 days)    Jan 27, 2020  3:00 PM CST  Office Visit with Dontrell Gómez MD  [facility] [address] [place] [facility] [id number] [phone]             Requested Prescriptions   Pending Prescriptions Disp Refills     potassium chloride ER (K-TAB/KLOR-CON) 10 MEQ CR tablet 90 tablet 3     Sig: Take 1 tablet (10 mEq) by mouth 2 times daily       Potassium Supplements Protocol Failed - 1/23/2020  7:06 PM        Failed - Normal serum potassium in past 12 months     Recent Labs   Lab Test 01/22/20  1216   POTASSIUM 2.7*                    Passed - Recent (12 mo) or future (30 days) visit within the authorizing provider's department     Patient has had an office visit with the authorizing provider or a provider within the authorizing providers department within the previous 12 mos or has a future within next 30 days. See \"Patient Info\" tab in inbasket, or \"Choose Columns\" in Meds & Orders section of the refill encounter.              Passed - Medication is active on med list        Passed - Patient is age 18 or older          "

## 2020-01-24 NOTE — TELEPHONE ENCOUNTER
Component      Latest Ref Rng & Units 1/7/2020 1/9/2020 1/15/2020 1/22/2020   Sodium      133 - 144 mmol/L 135 135 133 134   Potassium      3.4 - 5.3 mmol/L 2.9 (L) 2.9 (L) 2.8 (L) 2.7 (L)   Chloride      94 - 109 mmol/L 97 94 93 (L) 94   Carbon Dioxide      20 - 32 mmol/L 29 34 (H) 32 34 (H)   Anion Gap      3 - 14 mmol/L 9 7 8 6   Glucose      70 - 99 mg/dL 153 (H) 101 (H) 158 (H) 127 (H)   Urea Nitrogen      7 - 30 mg/dL 24 24 34 (H) 37 (H)   Creatinine      0.66 - 1.25 mg/dL 0.89 0.92 1.05 1.05    I called and spoke to patient.  He states he was told to increase his potassium from 10 meq bid to 10 meq tid on 1/22/2020.  I do not find documentation, but glad to know he has increased as advised. Needs refill, which I have sent at this time.  Follow up with PCP on Monday.  Meanwhile, called  to discuss if this is a great enough dose increase. He has reviewed patient's EKG.  Agrees with increased K+ dose over weekend to 30 mEq, and will see patient on Monday as scheduled.   Rita York RN on 1/24/2020 at 4:47 PM

## 2020-01-27 ENCOUNTER — OFFICE VISIT (OUTPATIENT)
Dept: FAMILY MEDICINE | Facility: CLINIC | Age: 80
End: 2020-01-27
Payer: MEDICARE

## 2020-01-27 ENCOUNTER — ANCILLARY PROCEDURE (OUTPATIENT)
Dept: GENERAL RADIOLOGY | Facility: CLINIC | Age: 80
End: 2020-01-27
Attending: INTERNAL MEDICINE
Payer: MEDICARE

## 2020-01-27 VITALS
TEMPERATURE: 97 F | SYSTOLIC BLOOD PRESSURE: 132 MMHG | HEIGHT: 72 IN | WEIGHT: 297 LBS | HEART RATE: 88 BPM | DIASTOLIC BLOOD PRESSURE: 80 MMHG | OXYGEN SATURATION: 96 % | BODY MASS INDEX: 40.23 KG/M2

## 2020-01-27 DIAGNOSIS — E03.9 HYPOTHYROIDISM, UNSPECIFIED TYPE: ICD-10-CM

## 2020-01-27 DIAGNOSIS — I25.10 CORONARY ARTERY DISEASE INVOLVING NATIVE HEART WITHOUT ANGINA PECTORIS, UNSPECIFIED VESSEL OR LESION TYPE: ICD-10-CM

## 2020-01-27 DIAGNOSIS — I50.32 CHRONIC DIASTOLIC HEART FAILURE (H): Primary | ICD-10-CM

## 2020-01-27 DIAGNOSIS — J18.9 PNEUMONIA DUE TO INFECTIOUS ORGANISM, UNSPECIFIED LATERALITY, UNSPECIFIED PART OF LUNG: ICD-10-CM

## 2020-01-27 DIAGNOSIS — J41.0 SIMPLE CHRONIC BRONCHITIS (H): ICD-10-CM

## 2020-01-27 DIAGNOSIS — I82.4Z9 DEEP VEIN THROMBOSIS (DVT) OF DISTAL VEIN OF LOWER EXTREMITY, UNSPECIFIED CHRONICITY, UNSPECIFIED LATERALITY (H): ICD-10-CM

## 2020-01-27 DIAGNOSIS — I10 ESSENTIAL HYPERTENSION: ICD-10-CM

## 2020-01-27 DIAGNOSIS — E87.6 HYPOKALEMIA: ICD-10-CM

## 2020-01-27 DIAGNOSIS — G47.33 OSA (OBSTRUCTIVE SLEEP APNEA): ICD-10-CM

## 2020-01-27 DIAGNOSIS — I48.0 PAROXYSMAL A-FIB (H): ICD-10-CM

## 2020-01-27 LAB
ERYTHROCYTE [DISTWIDTH] IN BLOOD BY AUTOMATED COUNT: 15.9 % (ref 10–15)
HCT VFR BLD AUTO: 41.3 % (ref 40–53)
HGB BLD-MCNC: 13.4 G/DL (ref 13.3–17.7)
MCH RBC QN AUTO: 31.9 PG (ref 26.5–33)
MCHC RBC AUTO-ENTMCNC: 32.4 G/DL (ref 31.5–36.5)
MCV RBC AUTO: 98 FL (ref 78–100)
PLATELET # BLD AUTO: 212 10E9/L (ref 150–450)
RBC # BLD AUTO: 4.2 10E12/L (ref 4.4–5.9)
WBC # BLD AUTO: 12.4 10E9/L (ref 4–11)

## 2020-01-27 PROCEDURE — 99214 OFFICE O/P EST MOD 30 MIN: CPT | Performed by: INTERNAL MEDICINE

## 2020-01-27 PROCEDURE — 80048 BASIC METABOLIC PNL TOTAL CA: CPT | Performed by: INTERNAL MEDICINE

## 2020-01-27 PROCEDURE — 85027 COMPLETE CBC AUTOMATED: CPT | Performed by: INTERNAL MEDICINE

## 2020-01-27 PROCEDURE — 71046 X-RAY EXAM CHEST 2 VIEWS: CPT

## 2020-01-27 PROCEDURE — 36415 COLL VENOUS BLD VENIPUNCTURE: CPT | Performed by: INTERNAL MEDICINE

## 2020-01-27 ASSESSMENT — MIFFLIN-ST. JEOR: SCORE: 2095.18

## 2020-01-27 NOTE — LETTER
[facility]  [address] [facility]  [address]  [place]  [address]  Tel: [phone]    February 3, 2020    Erich Craven  [address]  [facility] [address]        Jaun,     Rena are your lab reports from your recent office exam.   The basic metabolic panel showed that your potassium was still low but better than the last time it was checked.  I recommend increasing your potassium to 2 doses twice a day instead of 1 dose 3 times a day.  Otherwise, your kidney function tests are staying stable.     The CBC remained stable as well, no sign of low blood or anemia.   I have sent a change in your potassium to the drugstore.     Thanks, GB         Enclosure: Lab Results  Results for orders placed or performed in visit on 01/27/20   XR Chest 2 Views     Status: None    Narrative    CHEST TWO VIEWS 1/27/2020 4:35 PM     HISTORY: Pneumonia due to infectious organism, unspecified laterality,  unspecified part of lung.    COMPARISON: September 30, 2019       Impression    IMPRESSION: Stable cardiac device. Stable elevated right  hemidiaphragm. There are no acute infiltrates. The cardiac silhouette  is not enlarged. Pulmonary vasculature is unremarkable.     TATYANA SALDANA MD   Results for orders placed or performed in visit on 01/27/20   Basic metabolic panel     Status: Abnormal   Result Value Ref Range    Sodium 131 (L) 133 - 144 mmol/L    Potassium 3.2 (L) 3.4 - 5.3 mmol/L    Chloride 91 (L) 94 - 109 mmol/L    Carbon Dioxide 31 20 - 32 mmol/L    Anion Gap 11 3 - 14 mmol/L    Glucose 97 70 - 99 mg/dL    Urea Nitrogen 36 (H) 7 - 30 mg/dL    Creatinine 1.04 0.66 - 1.25 mg/dL    GFR Estimate 67 >60 mL/min/[1.73_m2]    GFR Estimate If Black 78 >60 mL/min/[1.73_m2]    Calcium 9.0 8.5 - 10.1 mg/dL   CBC with platelets     Status: Abnormal   Result Value Ref Range    WBC 12.4 (H) 4.0 - 11.0 10e9/L    RBC Count 4.20 (L) 4.4 - 5.9 10e12/L    Hemoglobin 13.4 13.3 - 17.7 g/dL    Hematocrit 41.3 40.0 - 53.0 %    MCV 98  78 - 100 fl    MCH 31.9 26.5 - 33.0 pg    MCHC 32.4 31.5 - 36.5 g/dL    RDW 15.9 (H) 10.0 - 15.0 %    Platelet Count 212 150 - 450 10e9/L

## 2020-01-28 LAB
ANION GAP SERPL CALCULATED.3IONS-SCNC: 11 MMOL/L (ref 3–14)
BUN SERPL-MCNC: 36 MG/DL (ref 7–30)
CALCIUM SERPL-MCNC: 9 MG/DL (ref 8.5–10.1)
CHLORIDE SERPL-SCNC: 91 MMOL/L (ref 94–109)
CO2 SERPL-SCNC: 31 MMOL/L (ref 20–32)
CREAT SERPL-MCNC: 1.04 MG/DL (ref 0.66–1.25)
GFR SERPL CREATININE-BSD FRML MDRD: 67 ML/MIN/{1.73_M2}
GLUCOSE SERPL-MCNC: 97 MG/DL (ref 70–99)
POTASSIUM SERPL-SCNC: 3.2 MMOL/L (ref 3.4–5.3)
SODIUM SERPL-SCNC: 131 MMOL/L (ref 133–144)

## 2020-01-30 DIAGNOSIS — G47.33 OSA (OBSTRUCTIVE SLEEP APNEA): Primary | ICD-10-CM

## 2020-02-01 ENCOUNTER — APPOINTMENT (OUTPATIENT)
Dept: GENERAL RADIOLOGY | Facility: CLINIC | Age: 80
End: 2020-02-01
Attending: EMERGENCY MEDICINE
Payer: MEDICARE

## 2020-02-01 ENCOUNTER — NURSE TRIAGE (OUTPATIENT)
Dept: NURSING | Facility: CLINIC | Age: 80
End: 2020-02-01

## 2020-02-01 ENCOUNTER — HOSPITAL ENCOUNTER (EMERGENCY)
Facility: CLINIC | Age: 80
Discharge: HOME | End: 2020-02-01
Attending: EMERGENCY MEDICINE | Admitting: EMERGENCY MEDICINE
Payer: MEDICARE

## 2020-02-01 VITALS
BODY MASS INDEX: 39.96 KG/M2 | OXYGEN SATURATION: 93 % | HEIGHT: 72 IN | DIASTOLIC BLOOD PRESSURE: 79 MMHG | SYSTOLIC BLOOD PRESSURE: 151 MMHG | HEART RATE: 95 BPM | RESPIRATION RATE: 18 BRPM | TEMPERATURE: 97.8 F | WEIGHT: 295 LBS

## 2020-02-01 DIAGNOSIS — J44.9 CHRONIC OBSTRUCTIVE PULMONARY DISEASE, UNSPECIFIED COPD TYPE (H): ICD-10-CM

## 2020-02-01 DIAGNOSIS — R05.9 COUGH: ICD-10-CM

## 2020-02-01 DIAGNOSIS — J44.1 COPD EXACERBATION (H): ICD-10-CM

## 2020-02-01 LAB
ANION GAP SERPL CALCULATED.3IONS-SCNC: 4 MMOL/L (ref 3–14)
BASOPHILS # BLD AUTO: 0.1 10E9/L (ref 0–0.2)
BASOPHILS NFR BLD AUTO: 0.4 %
BUN SERPL-MCNC: 42 MG/DL (ref 7–30)
CALCIUM SERPL-MCNC: 9.2 MG/DL (ref 8.5–10.1)
CHLORIDE SERPL-SCNC: 93 MMOL/L (ref 94–109)
CO2 SERPL-SCNC: 37 MMOL/L (ref 20–32)
CREAT SERPL-MCNC: 1.07 MG/DL (ref 0.66–1.25)
DIFFERENTIAL METHOD BLD: ABNORMAL
EOSINOPHIL # BLD AUTO: 0.3 10E9/L (ref 0–0.7)
EOSINOPHIL NFR BLD AUTO: 2.5 %
ERYTHROCYTE [DISTWIDTH] IN BLOOD BY AUTOMATED COUNT: 15.8 % (ref 10–15)
FLUAV+FLUBV AG SPEC QL: NEGATIVE
FLUAV+FLUBV AG SPEC QL: NEGATIVE
GFR SERPL CREATININE-BSD FRML MDRD: 65 ML/MIN/{1.73_M2}
GLUCOSE SERPL-MCNC: 125 MG/DL (ref 70–99)
HCT VFR BLD AUTO: 39.4 % (ref 40–53)
HGB BLD-MCNC: 12.9 G/DL (ref 13.3–17.7)
IMM GRANULOCYTES # BLD: 0.1 10E9/L (ref 0–0.4)
IMM GRANULOCYTES NFR BLD: 0.8 %
INR PPP: 2.15 (ref 0.86–1.14)
LYMPHOCYTES # BLD AUTO: 2.6 10E9/L (ref 0.8–5.3)
LYMPHOCYTES NFR BLD AUTO: 20.6 %
MCH RBC QN AUTO: 31.1 PG (ref 26.5–33)
MCHC RBC AUTO-ENTMCNC: 32.7 G/DL (ref 31.5–36.5)
MCV RBC AUTO: 95 FL (ref 78–100)
MONOCYTES # BLD AUTO: 1.1 10E9/L (ref 0–1.3)
MONOCYTES NFR BLD AUTO: 8.6 %
NEUTROPHILS # BLD AUTO: 8.5 10E9/L (ref 1.6–8.3)
NEUTROPHILS NFR BLD AUTO: 67.1 %
PLATELET # BLD AUTO: 221 10E9/L (ref 150–450)
POTASSIUM SERPL-SCNC: 3.3 MMOL/L (ref 3.4–5.3)
RBC # BLD AUTO: 4.15 10E12/L (ref 4.4–5.9)
SODIUM SERPL-SCNC: 134 MMOL/L (ref 133–144)
SPECIMEN SOURCE: NORMAL
TROPONIN I SERPL-MCNC: <0.015 UG/L (ref 0–0.04)
WBC # BLD AUTO: 12.6 10E9/L (ref 4–11)

## 2020-02-01 PROCEDURE — 85610 PROTHROMBIN TIME: CPT | Performed by: EMERGENCY MEDICINE

## 2020-02-01 PROCEDURE — 93005 ELECTROCARDIOGRAM TRACING: CPT

## 2020-02-01 PROCEDURE — 99285 EMERGENCY DEPT VISIT HI MDM: CPT | Mod: 25

## 2020-02-01 PROCEDURE — 25000125 ZZHC RX 250: Performed by: EMERGENCY MEDICINE

## 2020-02-01 PROCEDURE — 25000128 H RX IP 250 OP 636: Performed by: EMERGENCY MEDICINE

## 2020-02-01 PROCEDURE — 84484 ASSAY OF TROPONIN QUANT: CPT | Performed by: EMERGENCY MEDICINE

## 2020-02-01 PROCEDURE — 71046 X-RAY EXAM CHEST 2 VIEWS: CPT

## 2020-02-01 PROCEDURE — 87804 INFLUENZA ASSAY W/OPTIC: CPT | Performed by: EMERGENCY MEDICINE

## 2020-02-01 PROCEDURE — 80048 BASIC METABOLIC PNL TOTAL CA: CPT | Performed by: EMERGENCY MEDICINE

## 2020-02-01 PROCEDURE — 96374 THER/PROPH/DIAG INJ IV PUSH: CPT

## 2020-02-01 PROCEDURE — 85025 COMPLETE CBC W/AUTO DIFF WBC: CPT | Performed by: EMERGENCY MEDICINE

## 2020-02-01 PROCEDURE — 94640 AIRWAY INHALATION TREATMENT: CPT

## 2020-02-01 RX ORDER — PREDNISONE 20 MG/1
TABLET ORAL
Qty: 10 TABLET | Refills: 0 | Status: SHIPPED | OUTPATIENT
Start: 2020-02-01 | End: 2020-02-10

## 2020-02-01 RX ORDER — IPRATROPIUM BROMIDE AND ALBUTEROL SULFATE 2.5; .5 MG/3ML; MG/3ML
SOLUTION RESPIRATORY (INHALATION)
Status: DISCONTINUED
Start: 2020-02-01 | End: 2020-02-01 | Stop reason: HOSPADM

## 2020-02-01 RX ORDER — METHYLPREDNISOLONE SODIUM SUCCINATE 125 MG/2ML
125 INJECTION, POWDER, LYOPHILIZED, FOR SOLUTION INTRAMUSCULAR; INTRAVENOUS ONCE
Status: COMPLETED | OUTPATIENT
Start: 2020-02-01 | End: 2020-02-01

## 2020-02-01 RX ORDER — IPRATROPIUM BROMIDE AND ALBUTEROL SULFATE 2.5; .5 MG/3ML; MG/3ML
6 SOLUTION RESPIRATORY (INHALATION) ONCE
Status: COMPLETED | OUTPATIENT
Start: 2020-02-01 | End: 2020-02-01

## 2020-02-01 RX ORDER — DOXYCYCLINE 100 MG/1
100 CAPSULE ORAL 2 TIMES DAILY
Qty: 14 CAPSULE | Refills: 0 | Status: SHIPPED | OUTPATIENT
Start: 2020-02-01 | End: 2020-02-10

## 2020-02-01 RX ORDER — ALBUTEROL SULFATE 0.83 MG/ML
2.5 SOLUTION RESPIRATORY (INHALATION) EVERY 4 HOURS PRN
Qty: 1 BOX | Refills: 0 | Status: SHIPPED | OUTPATIENT
Start: 2020-02-01 | End: 2020-02-10

## 2020-02-01 RX ADMIN — METHYLPREDNISOLONE SODIUM SUCCINATE 125 MG: 125 INJECTION, POWDER, FOR SOLUTION INTRAMUSCULAR; INTRAVENOUS at 17:24

## 2020-02-01 RX ADMIN — IPRATROPIUM BROMIDE AND ALBUTEROL SULFATE 3 ML: .5; 3 SOLUTION RESPIRATORY (INHALATION) at 17:24

## 2020-02-01 ASSESSMENT — ENCOUNTER SYMPTOMS
WHEEZING: 1
COUGH: 1
ABDOMINAL PAIN: 0
NAUSEA: 0
ROS GI COMMENTS: NO HEMOPTYSIS
DIARRHEA: 0
LIGHT-HEADEDNESS: 1
FEVER: 0
VOMITING: 0
SHORTNESS OF BREATH: 1

## 2020-02-01 ASSESSMENT — MIFFLIN-ST. JEOR: SCORE: 2086.11

## 2020-02-01 NOTE — ED AVS SNAPSHOT
Emergency Department  [address] [place] [address]  Phone:  [phone]  Fax:  [phone]                                    Erich Craven   MRN: [id number]    Department:   Emergency Department   Date of Visit:  2/1/2020           After Visit Summary Signature Page    I have received my discharge instructions, and my questions have been answered. I have discussed any challenges I see with this plan with the nurse or doctor.    ..........................................................................................................................................  Patient/Patient Representative Signature      ..........................................................................................................................................  Patient Representative Print Name and Relationship to Patient    ..................................................               ................................................  Date                                   Time    ..........................................................................................................................................  Reviewed by Signature/Title    ...................................................              ..............................................  Date                                               Time          22EPIC Rev 08/18

## 2020-02-01 NOTE — TELEPHONE ENCOUNTER
HX of Copd , FNA triage call : Dx with Pneumonia 1/27/20 per CXR but not on any antibiotic   -  Pt is having dizziness and  More yellow productive cough, uses 02 sat 93-95 in 02 2 liter with sleep cpap  for the last week ,  And today using 02  More Prn , using Albuterol MDI 5-6 x's 2 puffs for wheezing ,   Presenting problem :  Seen 1/27/20 by Dr Gómez at [facility]  Dx with Chronic diastolic heart failure , URI - follow up visit after ED 1/22/20 dizzy and hypkalemia , new wheezing but PCP is aware.   Guideline used : Cough - acute productive A AH .   Disposition and recommendations :   Go to ED . Having someone drive him to [facility] and instructed to  put on mask on arrival to ED and gave [facility] a call for heads up per Pt's request.    Caller verbalizes understanding and denies further questions and will call back if further symptoms to triage or questions  . Rebeca Nichole RN  - [place] Nurse Advisor     Reason for Disposition    Patient sounds very sick or weak to the triager    Additional Information    Negative: Severe difficulty breathing (e.g., struggling for each breath, speaks in single words)    Negative: Bluish (or gray) lips or face now    Negative: [1] Difficulty breathing AND [2] exposure to flames, smoke, or fumes    Negative: [1] Stridor AND [2] difficulty breathing    Negative: Sounds like a life-threatening emergency to the triager    Negative: [1] Previous asthma attacks AND [2] this feels like asthma attack    Negative: Dry (non-productive) cough (i.e., no sputum or minimal clear sputum)    Negative: Chest pain  (Exception: MILD central chest pain, present only when coughing)    Negative: Difficulty breathing    Protocols used: COUGH - ACUTE [id number]

## 2020-02-02 LAB — INTERPRETATION ECG - MUSE: NORMAL

## 2020-02-03 DIAGNOSIS — I50.32 CHRONIC DIASTOLIC HEART FAILURE (H): ICD-10-CM

## 2020-02-03 RX ORDER — POTASSIUM CHLORIDE 750 MG/1
20 TABLET, EXTENDED RELEASE ORAL 2 TIMES DAILY
Qty: 360 TABLET | Refills: 3 | Status: SHIPPED | OUTPATIENT
Start: 2020-02-03 | End: 2020-07-17

## 2020-02-10 ENCOUNTER — ANTICOAGULATION THERAPY VISIT (OUTPATIENT)
Dept: NURSING | Facility: CLINIC | Age: 80
End: 2020-02-10
Payer: MEDICARE

## 2020-02-10 ENCOUNTER — OFFICE VISIT (OUTPATIENT)
Dept: FAMILY MEDICINE | Facility: CLINIC | Age: 80
End: 2020-02-10
Payer: MEDICARE

## 2020-02-10 VITALS
OXYGEN SATURATION: 92 % | TEMPERATURE: 99 F | SYSTOLIC BLOOD PRESSURE: 110 MMHG | HEART RATE: 87 BPM | HEIGHT: 72 IN | WEIGHT: 302.5 LBS | BODY MASS INDEX: 40.97 KG/M2 | DIASTOLIC BLOOD PRESSURE: 63 MMHG

## 2020-02-10 DIAGNOSIS — I82.4Z9 DEEP VEIN THROMBOSIS (DVT) OF DISTAL VEIN OF LOWER EXTREMITY, UNSPECIFIED CHRONICITY, UNSPECIFIED LATERALITY (H): ICD-10-CM

## 2020-02-10 DIAGNOSIS — I50.32 CHRONIC DIASTOLIC HEART FAILURE (H): ICD-10-CM

## 2020-02-10 DIAGNOSIS — J44.9 CHRONIC OBSTRUCTIVE PULMONARY DISEASE, UNSPECIFIED COPD TYPE (H): Primary | ICD-10-CM

## 2020-02-10 DIAGNOSIS — I48.0 PAROXYSMAL A-FIB (H): ICD-10-CM

## 2020-02-10 DIAGNOSIS — R06.02 SHORTNESS OF BREATH: ICD-10-CM

## 2020-02-10 DIAGNOSIS — I25.10 CORONARY ARTERY DISEASE INVOLVING NATIVE HEART WITHOUT ANGINA PECTORIS, UNSPECIFIED VESSEL OR LESION TYPE: ICD-10-CM

## 2020-02-10 DIAGNOSIS — E03.9 HYPOTHYROIDISM, UNSPECIFIED TYPE: ICD-10-CM

## 2020-02-10 DIAGNOSIS — I10 ESSENTIAL HYPERTENSION: ICD-10-CM

## 2020-02-10 DIAGNOSIS — E66.01 MORBID OBESITY (H): ICD-10-CM

## 2020-02-10 DIAGNOSIS — E78.5 HYPERLIPIDEMIA LDL GOAL <100: ICD-10-CM

## 2020-02-10 DIAGNOSIS — I26.99 PE (PULMONARY THROMBOEMBOLISM) (H): ICD-10-CM

## 2020-02-10 DIAGNOSIS — F41.9 ANXIETY: ICD-10-CM

## 2020-02-10 DIAGNOSIS — N40.0 PROSTATISM: ICD-10-CM

## 2020-02-10 LAB
ERYTHROCYTE [DISTWIDTH] IN BLOOD BY AUTOMATED COUNT: 16 % (ref 10–15)
HCT VFR BLD AUTO: 39 % (ref 40–53)
HGB BLD-MCNC: 12.8 G/DL (ref 13.3–17.7)
INR POINT OF CARE: 3.1 (ref 0.86–1.14)
MCH RBC QN AUTO: 32.2 PG (ref 26.5–33)
MCHC RBC AUTO-ENTMCNC: 32.8 G/DL (ref 31.5–36.5)
MCV RBC AUTO: 98 FL (ref 78–100)
NT-PROBNP SERPL-MCNC: 121 PG/ML (ref 0–450)
PLATELET # BLD AUTO: 205 10E9/L (ref 150–450)
RBC # BLD AUTO: 3.97 10E12/L (ref 4.4–5.9)
WBC # BLD AUTO: 11.7 10E9/L (ref 4–11)

## 2020-02-10 PROCEDURE — 80048 BASIC METABOLIC PNL TOTAL CA: CPT | Performed by: INTERNAL MEDICINE

## 2020-02-10 PROCEDURE — 99207 ZZC NO CHARGE NURSE ONLY: CPT

## 2020-02-10 PROCEDURE — 83880 ASSAY OF NATRIURETIC PEPTIDE: CPT | Performed by: INTERNAL MEDICINE

## 2020-02-10 PROCEDURE — 85610 PROTHROMBIN TIME: CPT | Mod: QW

## 2020-02-10 PROCEDURE — 85027 COMPLETE CBC AUTOMATED: CPT | Performed by: INTERNAL MEDICINE

## 2020-02-10 PROCEDURE — 36416 COLLJ CAPILLARY BLOOD SPEC: CPT

## 2020-02-10 PROCEDURE — 99214 OFFICE O/P EST MOD 30 MIN: CPT | Performed by: INTERNAL MEDICINE

## 2020-02-10 RX ORDER — VENLAFAXINE 37.5 MG/1
37.5 TABLET ORAL 2 TIMES DAILY
Qty: 180 TABLET | Refills: 3 | Status: SHIPPED | OUTPATIENT
Start: 2020-02-10 | End: 2021-02-05

## 2020-02-10 RX ORDER — LEVOTHYROXINE SODIUM 175 UG/1
175 TABLET ORAL DAILY
Qty: 90 TABLET | Refills: 3 | Status: SHIPPED | OUTPATIENT
Start: 2020-02-10 | End: 2021-05-27

## 2020-02-10 RX ORDER — DILTIAZEM HYDROCHLORIDE 300 MG/1
300 CAPSULE, COATED, EXTENDED RELEASE ORAL DAILY
Qty: 90 CAPSULE | Refills: 0 | Status: SHIPPED | OUTPATIENT
Start: 2020-02-10 | End: 2020-07-03

## 2020-02-10 RX ORDER — ATORVASTATIN CALCIUM 40 MG/1
40 TABLET, FILM COATED ORAL EVERY EVENING
Qty: 90 TABLET | Refills: 3 | Status: SHIPPED | OUTPATIENT
Start: 2020-02-10 | End: 2021-04-06

## 2020-02-10 RX ORDER — LISINOPRIL 40 MG/1
40 TABLET ORAL DAILY
Qty: 90 TABLET | Refills: 3 | Status: SHIPPED | OUTPATIENT
Start: 2020-02-10 | End: 2021-02-04

## 2020-02-10 RX ORDER — ISOSORBIDE MONONITRATE 30 MG/1
30 TABLET, EXTENDED RELEASE ORAL DAILY
Qty: 90 TABLET | Refills: 3 | Status: SHIPPED | OUTPATIENT
Start: 2020-02-10 | End: 2021-06-22

## 2020-02-10 RX ORDER — ALBUTEROL SULFATE 0.83 MG/ML
2.5 SOLUTION RESPIRATORY (INHALATION) EVERY 4 HOURS PRN
Qty: 1 BOX | Refills: 0 | Status: SHIPPED | OUTPATIENT
Start: 2020-02-10 | End: 2020-03-19

## 2020-02-10 RX ORDER — ALBUTEROL SULFATE 90 UG/1
2 AEROSOL, METERED RESPIRATORY (INHALATION) EVERY 4 HOURS PRN
Qty: 1 INHALER | Refills: 0 | Status: SHIPPED | OUTPATIENT
Start: 2020-02-10 | End: 2020-07-03

## 2020-02-10 RX ORDER — TAMSULOSIN HYDROCHLORIDE 0.4 MG/1
0.4 CAPSULE ORAL DAILY
Qty: 90 CAPSULE | Refills: 3 | Status: SHIPPED | OUTPATIENT
Start: 2020-02-10 | End: 2021-03-09

## 2020-02-10 RX ORDER — CARVEDILOL 12.5 MG/1
12.5 TABLET ORAL 2 TIMES DAILY
Qty: 180 TABLET | Refills: 3 | Status: SHIPPED | OUTPATIENT
Start: 2020-02-10 | End: 2020-07-03

## 2020-02-10 ASSESSMENT — MIFFLIN-ST. JEOR: SCORE: 2120.13

## 2020-02-10 NOTE — PROGRESS NOTES
ANTICOAGULATION FOLLOW-UP CLINIC VISIT    Patient Name:  Erich Craven  Date:  2/10/2020  Contact Type:  Face to Face    SUBJECTIVE:  Patient Findings     Positives:   Change in medications    Comments:   Pt states that he just finished an antibiotic on Saturday 2/8/20.        Clinical Outcomes     Comments:   Pt states that he just finished an antibiotic on Saturday 2/8/20.           OBJECTIVE    INR Protime   Date Value Ref Range Status   02/10/2020 3.1 (A) 0.86 - 1.14 Final       ASSESSMENT / PLAN  INR assessment SUPRA    Recheck INR In: 2 WEEKS    INR Location Clinic      Anticoagulation Summary  As of 2/10/2020    INR goal:   2.0-3.0   TTR:   69.6 % (10.1 mo)   INR used for dosing:   3.1! (2/10/2020)   Warfarin maintenance plan:   7.5 mg (5 mg x 1.5) every Wed; 5 mg (5 mg x 1) all other days   Full warfarin instructions:   2/11: 2.5 mg; Otherwise 7.5 mg every Wed; 5 mg all other days   Weekly warfarin total:   37.5 mg   Plan last modified:   Delia Jang RN (1/15/2020)   Next INR check:   2/24/2020   Target end date:            Anticoagulation Episode Summary     INR check location:       Preferred lab:       Send INR reminders to:   SERENITY BECERRA    Comments:         Anticoagulation Care Providers     Provider Role Specialty Phone number    Dontrell Gómez MD Referring Internal Medicine [phone]            See the Encounter Report to view Anticoagulation Flowsheet and Dosing Calendar (Go to Encounters tab in chart review, and find the Anticoagulation Therapy Visit)    Pt INR is 3.1 today. Pt already took 5 mg today 2/10/20. Pt advised to take 2.5 mg tomorrow 2/11/20 then 7.5 mg on Wednesdays and 5 mg all the other days. Recheck INR in 2 weeks or sooner as needed. Pt seeing  today after the [facility] appointment. Jaun aware if signs of clotting (pain, tenderness, swelling, color change in leg or arm, SOB) and bleeding occur (blood in stool, urine, large bruising, bleeding gums, nosebleeds) to  have INR check sooner. If sx severe report to ER or concerned for stroke call 911. If general questions or concerns arise, call clinic.         Delia Jang RN

## 2020-02-10 NOTE — PROGRESS NOTES
Subjective     Erich Craven is a 79 year old male who presents to clinic today for the following health issues:    HPI   ED/UC Followup:    Facility:  [facility]  Date of visit: 2/1/20  Reason for visit: Shortness of breath  Current Status: Feeling good today   Erich Craven is an 79 year old male with a hx of CAD, PE, COPD, CHF, HTN, HLD and atrial fibrillation who presents to the clinic for an ED f/u exam. The pt was admitted to the ED on 02/01/2020 for complaints of SOB and progressing sx's of productive cough, wheezing and light headedness. CXR proved to be unremarkable. An ECG showed the pt in sinus rhythm.     Recall the pt presented to the clinic on 01/27/2020 complaining of similar sx's with an unremarkable CXR. The pt was discharged from the ED on 02/01/2020 with Albuterol 0.083% neb solution q4h, Doxycycline (100 mg) 1 tablet BID for 7 days and prednisone (20 mg tabs) 2 tablets each day for 5 days.     Today the pt reports that his breathing is much improved. He very pleased with the nebulizer that he has been using. He notes that his ANNIKA is relatively unchanged and that he has gained some weight. He attributes his recent weight gain to his prednisone Rx. He does state that he was having a productive cough with clear sputum.     Patient Active Problem List   Diagnosis     Morbid obesity (H)     PE (pulmonary thromboembolism) (H)     HTN (hypertension)     Chronic diastolic heart failure (H)     Dyspnea     Fatigue     RIN (obstructive sleep apnea)     COPD (chronic obstructive pulmonary disease) (H)     Hypothyroidism     Paroxysmal A-fib (H)     HLD (hyperlipidemia)     Lymphedema     (HFpEF) heart failure with preserved ejection fraction (H)     Essential hypertension     Cardiac pacemaker in situ     DVT (deep venous thrombosis) (H)     CAD (coronary artery disease)     Obesity, Class III, BMI 40-49.9 (morbid obesity) (H)     Muscle tension headache     Heart failure with preserved ejection fraction,  unspecified HF chronicity (H)     Status post coronary angiogram     Past Surgical History:   Procedure Laterality Date     cardiac stenting       CV RIGHT HEART CATH N/A 11/5/2019    Procedure: Right Heart Cath;  Surgeon: Roberto Hernandez MD;  Location:  HEART CARDIAC CATH LAB     ENT SURGERY      tonsillectomy     IMPLANT PACEMAKER       ORTHOPEDIC SURGERY Right 2012    knee replacement       Social History     Tobacco Use     Smoking status: Former Smoker     Packs/day: 0.50     Years: 25.00     Pack years: 12.50     Smokeless tobacco: Never Used     Tobacco comment: quit in 1989   Substance Use Topics     Alcohol use: No     Frequency: Never     Comment: quit in 1989     Family History   Problem Relation Age of Onset     Hypertension Father          Current Outpatient Medications   Medication Sig Dispense Refill     Acetylcarnitine HCl (ACETYL L-CARNITINE) 500 MG CAPS Take 1 capsule by mouth 2 times daily       albuterol (PROAIR HFA/PROVENTIL HFA/VENTOLIN HFA) 108 (90 Base) MCG/ACT inhaler Inhale 2 puffs into the lungs every 4 hours as needed for shortness of breath / dyspnea or wheezing 1 Inhaler 0     albuterol (PROVENTIL) (2.5 MG/3ML) 0.083% neb solution Take 1 vial (2.5 mg) by nebulization every 4 hours as needed for shortness of breath / dyspnea or wheezing 1 Box 0     aspirin (ASA) 81 MG chewable tablet Take 1 tablet by mouth daily       atorvastatin (LIPITOR) 40 MG tablet Take 1 tablet (40 mg) by mouth every evening 90 tablet 3     carvedilol (COREG) 12.5 MG tablet Take 1 tablet (12.5 mg) by mouth 2 times daily 180 tablet 3     clonazePAM (KLONOPIN) 0.5 MG tablet TAKE 1 TABLET BY MOUTH TWICE A DAY AS NEEDED 60 tablet 0     co-enzyme Q-10 100 MG CAPS capsule Take 100 mg by mouth daily       diltiazem ER COATED BEADS (CARDIZEM CD/CARTIA XT) 300 MG 24 hr capsule Take 1 capsule (300 mg) by mouth daily 90 capsule 0     furosemide (LASIX) 20 MG tablet TAKE 3 TABLETS BY MOUTH TWICE DAILY. 540 tablet 3      isosorbide mononitrate (IMDUR) 30 MG 24 hr tablet Take 1 tablet (30 mg) by mouth daily 90 tablet 3     levothyroxine (SYNTHROID/LEVOTHROID) 175 MCG tablet Take 1 tablet (175 mcg) by mouth daily Additional 1/2 tablet on Sundays 90 tablet 3     lisinopril (PRINIVIL/ZESTRIL) 40 MG tablet Take 1 tablet (40 mg) by mouth daily 90 tablet 3     metolazone (ZAROXOLYN) 2.5 MG tablet Take 1 tablet (2.5 mg) by mouth 2 times daily 180 tablet 3     Multiple vitamin TABS Take 1 tablet by mouth daily       order for DME Oxygen 2 Li/min  Continuous. At night bled into PAP device. In the daytime, NC with bubbler: requiring portability 1 Device 0     potassium chloride ER (K-TAB/KLOR-CON) 10 MEQ CR tablet Take 2 tablets (20 mEq) by mouth 2 times daily 360 tablet 3     tamsulosin (FLOMAX) 0.4 MG capsule Take 1 capsule (0.4 mg) by mouth daily 90 capsule 3     Taurine 500 MG CAPS Take 500 mg by mouth 2 times daily       venlafaxine (EFFEXOR) 37.5 MG tablet Take 1 tablet (37.5 mg) by mouth 2 times daily 180 tablet 3     warfarin ANTICOAGULANT (COUMADIN) 5 MG tablet Take 1.5 tablet (7.5 mg) on Wednesdays and take 1 tablet (5 mg) all other days or as directed by the INR clinic 100 tablet 0     Allergies   Allergen Reactions     No Known Allergies        Reviewed and updated as needed this visit by Provider         Review of Systems   ROS COMP: Constitutional, HEENT, cardiovascular, pulmonary, gi and gu systems are negative, except as otherwise noted.    This document serves as a record of the services and decisions personally performed and made by Dontrell Gómez MD. It was created on his behalf by Patric Montero, a trained medical scribe. The creation of this document is based on the provider's statements to the medical scribe.  Patric Montero February 10, 2020 3:11 PM          Objective    /63 (BP Location: Left arm, Cuff Size: Adult Large)   Pulse 87   Temp 99  F (37.2  C) (Oral)   Ht 1.829 m (6')   Wt 137.2 kg (302 lb 8 oz)   SpO2  92%   BMI 41.03 kg/m    Body mass index is 41.03 kg/m .     Physical Exam   Gained 5 lbs since LOV  Morbidly obese white male in no acute distress  Neck was supple without adenopathy or thyromegaly his carotids were normal without bruits  Chest clear to auscultation and percussion  Cardiovascular S1 and S2 are physiologic without murmurs or gallops  Abdomen obese, bowel sounds were normal.  There is no palpable mass or organomegaly  Extremities nontender with 2++ pretibial edema on left and 2+ on the right, bilateral venous stasis changes   Pulses pedal pulses are as described otherwise his pulses are bilaterally symmetrical throughout without bruits  Skin without significant abnormality      Diagnostic Test Results:  Labs reviewed in Epic  Results for orders placed or performed in visit on 02/10/20 (from the past 24 hour(s))   INR point of care   Result Value Ref Range    INR Protime 3.1 (A) 0.86 - 1.14           Assessment & Plan     Chronic obstructive pulmonary disease, unspecified COPD type (H)  Continue use of the nebulizer and inhaler. Advised the pt that he can use either up to QID.   - albuterol (PROAIR HFA/PROVENTIL HFA/VENTOLIN HFA) 108 (90 Base) MCG/ACT inhaler  Dispense: 1 Inhaler; Refill: 0  - albuterol (PROVENTIL) (2.5 MG/3ML) 0.083% neb solution  Dispense: 1 Box; Refill: 0    Chronic diastolic heart failure (H)  Rechecking heart function in lab today. Pt will receive a report concern his results once they are made available.   - Basic metabolic panel  - BNP-N terminal pro  - carvedilol (COREG) 12.5 MG tablet  Dispense: 180 tablet; Refill: 3  - isosorbide mononitrate (IMDUR) 30 MG 24 hr tablet  Dispense: 90 tablet; Refill: 3  - lisinopril (PRINIVIL/ZESTRIL) 40 MG tablet  Dispense: 90 tablet; Refill: 3    Morbid obesity (H)      Paroxysmal A-fib (H)  Stable sinus rhythm     Deep vein thrombosis (DVT) of distal vein of lower extremity, unspecified chronicity, unspecified laterality (H)      PE  (pulmonary thromboembolism) (H)  CXR unremarkable for PE    Essential hypertension  Well controlled with current therapies   - CBC with platelets  - Basic metabolic panel  - diltiazem ER COATED BEADS (CARDIZEM CD/CARTIA XT) 300 MG 24 hr capsule  Dispense: 90 capsule; Refill: 0  - lisinopril (PRINIVIL/ZESTRIL) 40 MG tablet  Dispense: 90 tablet; Refill: 3    Hypothyroidism, unspecified type    - levothyroxine (SYNTHROID/LEVOTHROID) 175 MCG tablet  Dispense: 90 tablet; Refill: 3    Coronary artery disease involving native heart without angina pectoris, unspecified vessel or lesion type      Shortness of breath  Much improved with current therapies     Hyperlipidemia LDL goal <100    - atorvastatin (LIPITOR) 40 MG tablet  Dispense: 90 tablet; Refill: 3    Prostatism    - tamsulosin (FLOMAX) 0.4 MG capsule  Dispense: 90 capsule; Refill: 3    Anxiety    - venlafaxine (EFFEXOR) 37.5 MG tablet  Dispense: 180 tablet; Refill: 3           FUTURE APPOINTMENTS:       - Follow-up visit in 1 month     Return in about 1 month (around 3/10/2020) for Follow Up.    The information in this document, created by the medical scribe for me, accurately reflects the services I personally performed and the decisions made by me. I have reviewed and approved this document for accuracy prior to leaving the patient care area.  February 10, 2020 3:59 PM    Dontrell Gómez MD  [facility]

## 2020-02-10 NOTE — LETTER
[facility]  [address] [facility]  [address]  [place]  [address]  Tel: [phone]    February 24, 2020    Erich Craven  [address]  [facility] [address]            Erich     Enclosed your lab reports from your recent office exam.     The basic metabolic panel showed that your serum potassium is still on the low side.  You should make sure that you are taking your potassium tablets, 2 tablets twice daily.  In addition to this you should be consuming other potassium rich foods like bananas tomatoes and tomato juice on a regular basis.  Your kidney function tests remain normal.     The BNP test which gives us an idea of fluid balance shows that there is no sign of congestive heart failure.     The CBC shows us that your blood cell counts are all stable, no sign of low blood or anemia.     I will be contacting you regarding these lab reports and arranging some further follow-up.       Sincerely,    Dontrell Gómez MD / daivd     Resulted Orders   CBC with platelets   Result Value Ref Range    WBC 11.7 (H) 4.0 - 11.0 10e9/L    RBC Count 3.97 (L) 4.4 - 5.9 10e12/L    Hemoglobin 12.8 (L) 13.3 - 17.7 g/dL    Hematocrit 39.0 (L) 40.0 - 53.0 %    MCV 98 78 - 100 fl    MCH 32.2 26.5 - 33.0 pg    MCHC 32.8 31.5 - 36.5 g/dL    RDW 16.0 (H) 10.0 - 15.0 %    Platelet Count 205 150 - 450 10e9/L   Basic metabolic panel   Result Value Ref Range    Sodium 135 133 - 144 mmol/L    Potassium 3.1 (L) 3.4 - 5.3 mmol/L    Chloride 96 94 - 109 mmol/L    Carbon Dioxide 34 (H) 20 - 32 mmol/L    Anion Gap 5 3 - 14 mmol/L    Glucose 149 (H) 70 - 99 mg/dL    Urea Nitrogen 39 (H) 7 - 30 mg/dL    Creatinine 1.00 0.66 - 1.25 mg/dL    GFR Estimate 70 >60 mL/min/[1.73_m2]      Comment:      Non  GFR Calc  Starting 12/18/2018, serum creatinine based estimated GFR (eGFR) will be   calculated using the Chronic Kidney Disease Epidemiology Collaboration   (CKD-EPI) equation.      GFR Estimate If Black 82 >60  mL/min/[1.73_m2]      Comment:       GFR Calc  Starting 12/18/2018, serum creatinine based estimated GFR (eGFR) will be   calculated using the Chronic Kidney Disease Epidemiology Collaboration   (CKD-EPI) equation.      Calcium 8.8 8.5 - 10.1 mg/dL   BNP-N terminal pro   Result Value Ref Range    N-Terminal Pro Bnp 121 0 - 450 pg/mL      Comment:         Reference range shown and results flagged as abnormal are for the outpatient,   non acute settings. Establishing a baseline value for each individual patient   is useful for follow-up.  Suggested inpatient cut points for confirming diagnosis of CHF in an acute   setting are:   >450 pg/mL (age 18 to less than 50)   >900 pg/mL (age 50 to less than 75)   >1800 pg/mL (75 yrs and older)  An inpatient or emergency department NT-proPBNP <300 pg/mL effectively rules   out acute CHF, with 99% negative predictive value.

## 2020-02-11 LAB
ANION GAP SERPL CALCULATED.3IONS-SCNC: 5 MMOL/L (ref 3–14)
BUN SERPL-MCNC: 39 MG/DL (ref 7–30)
CALCIUM SERPL-MCNC: 8.8 MG/DL (ref 8.5–10.1)
CHLORIDE SERPL-SCNC: 96 MMOL/L (ref 94–109)
CO2 SERPL-SCNC: 34 MMOL/L (ref 20–32)
CREAT SERPL-MCNC: 1 MG/DL (ref 0.66–1.25)
GFR SERPL CREATININE-BSD FRML MDRD: 70 ML/MIN/{1.73_M2}
GLUCOSE SERPL-MCNC: 149 MG/DL (ref 70–99)
POTASSIUM SERPL-SCNC: 3.1 MMOL/L (ref 3.4–5.3)
SODIUM SERPL-SCNC: 135 MMOL/L (ref 133–144)

## 2020-02-18 ENCOUNTER — ANCILLARY PROCEDURE (OUTPATIENT)
Dept: CARDIOLOGY | Facility: CLINIC | Age: 80
End: 2020-02-18
Attending: INTERNAL MEDICINE
Payer: MEDICARE

## 2020-02-18 DIAGNOSIS — Z95.0 CARDIAC PACEMAKER IN SITU: ICD-10-CM

## 2020-02-18 PROCEDURE — 93294 REM INTERROG EVL PM/LDLS PM: CPT | Performed by: INTERNAL MEDICINE

## 2020-02-25 ENCOUNTER — ANTICOAGULATION THERAPY VISIT (OUTPATIENT)
Dept: NURSING | Facility: CLINIC | Age: 80
End: 2020-02-25
Payer: MEDICARE

## 2020-02-25 DIAGNOSIS — I26.99 PE (PULMONARY THROMBOEMBOLISM) (H): Primary | ICD-10-CM

## 2020-02-25 LAB
INR POINT OF CARE: 2.1 (ref 0.86–1.14)
MDC_IDC_EPISODE_DTM: NORMAL
MDC_IDC_EPISODE_DURATION: 6 S
MDC_IDC_EPISODE_ID: NORMAL
MDC_IDC_EPISODE_TYPE: NORMAL
MDC_IDC_LEAD_IMPLANT_DT: NORMAL
MDC_IDC_LEAD_IMPLANT_DT: NORMAL
MDC_IDC_LEAD_LOCATION: NORMAL
MDC_IDC_LEAD_LOCATION: NORMAL
MDC_IDC_LEAD_LOCATION_DETAIL_1: NORMAL
MDC_IDC_LEAD_LOCATION_DETAIL_1: NORMAL
MDC_IDC_LEAD_MFG: NORMAL
MDC_IDC_LEAD_MFG: NORMAL
MDC_IDC_LEAD_MODEL: NORMAL
MDC_IDC_LEAD_MODEL: NORMAL
MDC_IDC_LEAD_POLARITY_TYPE: NORMAL
MDC_IDC_LEAD_POLARITY_TYPE: NORMAL
MDC_IDC_LEAD_SERIAL: NORMAL
MDC_IDC_LEAD_SERIAL: NORMAL
MDC_IDC_MSMT_BATTERY_DTM: NORMAL
MDC_IDC_MSMT_BATTERY_REMAINING_LONGEVITY: 42 MO
MDC_IDC_MSMT_BATTERY_REMAINING_PERCENTAGE: 67 %
MDC_IDC_MSMT_BATTERY_STATUS: NORMAL
MDC_IDC_MSMT_LEADCHNL_RA_IMPEDANCE_VALUE: 500 OHM
MDC_IDC_MSMT_LEADCHNL_RA_PACING_THRESHOLD_AMPLITUDE: 0.4 V
MDC_IDC_MSMT_LEADCHNL_RA_PACING_THRESHOLD_PULSEWIDTH: 0.4 MS
MDC_IDC_MSMT_LEADCHNL_RV_IMPEDANCE_VALUE: 431 OHM
MDC_IDC_MSMT_LEADCHNL_RV_PACING_THRESHOLD_AMPLITUDE: 2.1 V
MDC_IDC_MSMT_LEADCHNL_RV_PACING_THRESHOLD_PULSEWIDTH: 0.4 MS
MDC_IDC_PG_IMPLANT_DTM: NORMAL
MDC_IDC_PG_MFG: NORMAL
MDC_IDC_PG_MODEL: NORMAL
MDC_IDC_PG_SERIAL: NORMAL
MDC_IDC_PG_TYPE: NORMAL
MDC_IDC_SESS_CLINIC_NAME: NORMAL
MDC_IDC_SESS_DTM: NORMAL
MDC_IDC_SESS_TYPE: NORMAL
MDC_IDC_SET_BRADY_AT_MODE_SWITCH_MODE: NORMAL
MDC_IDC_SET_BRADY_AT_MODE_SWITCH_RATE: 150 {BEATS}/MIN
MDC_IDC_SET_BRADY_LOWRATE: 60 {BEATS}/MIN
MDC_IDC_SET_BRADY_MAX_SENSOR_RATE: 130 {BEATS}/MIN
MDC_IDC_SET_BRADY_MAX_TRACKING_RATE: 130 {BEATS}/MIN
MDC_IDC_SET_BRADY_MODE: NORMAL
MDC_IDC_SET_BRADY_PAV_DELAY_HIGH: 220 MS
MDC_IDC_SET_BRADY_PAV_DELAY_LOW: 250 MS
MDC_IDC_SET_BRADY_SAV_DELAY_HIGH: 220 MS
MDC_IDC_SET_BRADY_SAV_DELAY_LOW: 250 MS
MDC_IDC_SET_LEADCHNL_RA_PACING_AMPLITUDE: 2 V
MDC_IDC_SET_LEADCHNL_RA_PACING_POLARITY: NORMAL
MDC_IDC_SET_LEADCHNL_RA_PACING_PULSEWIDTH: 0.4 MS
MDC_IDC_SET_LEADCHNL_RA_SENSING_ADAPTATION_MODE: NORMAL
MDC_IDC_SET_LEADCHNL_RA_SENSING_POLARITY: NORMAL
MDC_IDC_SET_LEADCHNL_RA_SENSING_SENSITIVITY: 0.5 MV
MDC_IDC_SET_LEADCHNL_RV_PACING_AMPLITUDE: 2.4 V
MDC_IDC_SET_LEADCHNL_RV_PACING_CAPTURE_MODE: NORMAL
MDC_IDC_SET_LEADCHNL_RV_PACING_POLARITY: NORMAL
MDC_IDC_SET_LEADCHNL_RV_PACING_PULSEWIDTH: 0.4 MS
MDC_IDC_SET_LEADCHNL_RV_SENSING_ADAPTATION_MODE: NORMAL
MDC_IDC_SET_LEADCHNL_RV_SENSING_POLARITY: NORMAL
MDC_IDC_SET_LEADCHNL_RV_SENSING_SENSITIVITY: 1 MV
MDC_IDC_SET_ZONE_DETECTION_INTERVAL: 375 MS
MDC_IDC_SET_ZONE_TYPE: NORMAL
MDC_IDC_SET_ZONE_VENDOR_TYPE: NORMAL
MDC_IDC_STAT_AT_BURDEN_PERCENT: 1 %
MDC_IDC_STAT_AT_DTM_END: NORMAL
MDC_IDC_STAT_AT_DTM_START: NORMAL
MDC_IDC_STAT_BRADY_DTM_END: NORMAL
MDC_IDC_STAT_BRADY_DTM_START: NORMAL
MDC_IDC_STAT_BRADY_RA_PERCENT_PACED: 2 %
MDC_IDC_STAT_BRADY_RV_PERCENT_PACED: 0 %
MDC_IDC_STAT_EPISODE_RECENT_COUNT: 0
MDC_IDC_STAT_EPISODE_RECENT_COUNT: 1
MDC_IDC_STAT_EPISODE_RECENT_COUNT: 273
MDC_IDC_STAT_EPISODE_RECENT_COUNT: 29
MDC_IDC_STAT_EPISODE_RECENT_COUNT_DTM_END: NORMAL
MDC_IDC_STAT_EPISODE_RECENT_COUNT_DTM_START: NORMAL
MDC_IDC_STAT_EPISODE_TYPE: NORMAL
MDC_IDC_STAT_EPISODE_VENDOR_TYPE: NORMAL

## 2020-02-25 PROCEDURE — 99207 ZZC NO CHARGE NURSE ONLY: CPT

## 2020-02-25 PROCEDURE — 85610 PROTHROMBIN TIME: CPT | Mod: QW

## 2020-02-25 PROCEDURE — 36416 COLLJ CAPILLARY BLOOD SPEC: CPT

## 2020-02-25 NOTE — PROGRESS NOTES
ANTICOAGULATION FOLLOW-UP CLINIC VISIT    Patient Name:  Erich Craven  Date:  2020  Contact Type:  Face to Face    SUBJECTIVE:  Patient Findings     Comments:   The patient was assessed for diet, medication, and activity level changes, missed or extra doses, bruising or bleeding, with no problem findings.        Clinical Outcomes     Negatives:   Major bleeding event, Thromboembolic event, Anticoagulation-related hospital admission, Anticoagulation-related ED visit, Anticoagulation-related fatality    Comments:   The patient was assessed for diet, medication, and activity level changes, missed or extra doses, bruising or bleeding, with no problem findings.           OBJECTIVE    INR Protime   Date Value Ref Range Status   2020 2.1 (A) 0.86 - 1.14 Final       ASSESSMENT / PLAN  INR assessment THER    Recheck INR In: 4 WEEKS    INR Location Clinic      Anticoagulation Summary  As of 2020    INR goal:   2.0-3.0   TTR:   70.6 % (10.6 mo)   INR used for dosin.1 (2020)   Warfarin maintenance plan:   7.5 mg (5 mg x 1.5) every Wed; 5 mg (5 mg x 1) all other days   Full warfarin instructions:   7.5 mg every Wed; 5 mg all other days   Weekly warfarin total:   37.5 mg   No change documented:   Delia Jang, RN   Plan last modified:   Delia Jang, RN (1/15/2020)   Next INR check:   3/24/2020   Target end date:            Anticoagulation Episode Summary     INR check location:       Preferred lab:       Send INR reminders to:   SERENITY BECERRA    Comments:         Anticoagulation Care Providers     Provider Role Specialty Phone number    Dontrell Gómez MD Referring Internal Medicine [phone]            See the Encounter Report to view Anticoagulation Flowsheet and Dosing Calendar (Go to Encounters tab in chart review, and find the Anticoagulation Therapy Visit)    Pt INR is 2.1 today. Pt advised to continue taking 7.5 mg on  and 5 mg all the other days. Recheck INR in 4 weeks  or sooner as needed. Pt advised to get his INR checked sooner if he has a change in health. Jaun aware if signs of clotting (pain, tenderness, swelling, color change in leg or arm, SOB) and bleeding occur (blood in stool, urine, large bruising, bleeding gums, nosebleeds) to have INR check sooner. If sx severe report to ER or concerned for stroke call 911. If general questions or concerns arise, call clinic.         Delia Jang RN

## 2020-02-28 DIAGNOSIS — F43.21 ADJUSTMENT DISORDER WITH DEPRESSED MOOD: ICD-10-CM

## 2020-02-28 DIAGNOSIS — F41.9 ANXIETY: ICD-10-CM

## 2020-02-28 NOTE — TELEPHONE ENCOUNTER
Controlled Substance Refill Request for   clonazePAM (KLONOPIN) 0.5 MG tablet 60 tablet 0 2/6/2020     Problem List Complete:  Yes    Last Written Prescription Date:  2/6/2020  Last Fill Quantity: 60,   # refills: 0    THE MOST RECENT OFFICE VISIT MUST BE WITHIN THE PAST 3 MONTHS. AT LEAST ONE FACE TO FACE VISIT MUST OCCUR EVERY 6 MONTHS. ADDITIONAL VISITS CAN BE VIRTUAL.  (THIS STATEMENT SHOULD BE DELETED.)    Last Office Visit with [facility] primary care provider: 2/10/2020    Future Office visit: Unknown     Controlled substance agreement:   Encounter-Level CSA:    There are no encounter-level csa.     Patient-Level CSA:    There are no patient-level csa.         Last Urine Drug Screen: No results found for: CDAUT, No results found for: COMDAT, No results found for: THC13, PCP13, COC13, MAMP13, OPI13, AMP13, BZO13, TCA13, MTD13, BAR13, OXY13, PPX13, BUP13     Processing:  Rx to be electronically transmitted to pharmacy by provider     [email or web address]   checked in past 3 months?  No, route to RN

## 2020-03-02 PROBLEM — F41.9 ANXIETY: Status: ACTIVE | Noted: 2020-03-02

## 2020-03-02 RX ORDER — CLONAZEPAM 0.5 MG/1
TABLET ORAL
Qty: 60 TABLET | Refills: 0 | Status: SHIPPED | OUTPATIENT
Start: 2020-03-02 | End: 2020-04-08

## 2020-03-18 DIAGNOSIS — J44.9 CHRONIC OBSTRUCTIVE PULMONARY DISEASE, UNSPECIFIED COPD TYPE (H): ICD-10-CM

## 2020-03-18 NOTE — TELEPHONE ENCOUNTER
"Last Written Prescription Date:  2/10/20  Last Fill Quantity: 1 box,  # refills: 0   Last office visit: 2/10/2020 with prescribing provider:  Dalia   Future Office Visit:   Next 5 appointments (look out 90 days)    May 28, 2020  7:45 AM CDT  Return Visit with Brent Portillo MD  [facility] ([facility]) [address] [place] [id number] [phone] OPT 2         Requested Prescriptions   Pending Prescriptions Disp Refills     albuterol (PROVENTIL) (2.5 MG/3ML) 0.083% neb solution [Pharmacy Med Name: ALBUTEROL SUL 2.5 MG/3 ML SOLN] 75 mL 0     Sig: INHALE 1 VIAL BY NEBULIZATION EVERY 4 HOURS AS NEEDED FOR SHORTNESS OF BREATH/DYSPNEA OR WHEEZING.       Asthma Maintenance Inhalers - Anticholinergics Passed - 3/18/2020  3:43 PM        Passed - Patient is age 12 years or older        Passed - Recent (12 mo) or future (30 days) visit within the authorizing provider's specialty     Patient has had an office visit with the authorizing provider or a provider within the authorizing providers department within the previous 12 mos or has a future within next 30 days. See \"Patient Info\" tab in inbasket, or \"Choose Columns\" in Meds & Orders section of the refill encounter.              Passed - Medication is active on med list       Short-Acting Beta Agonist Inhalers Protocol  Passed - 3/18/2020  3:43 PM        Passed - Patient is age 12 or older        Passed - Recent (12 mo) or future (30 days) visit within the authorizing provider's specialty     Patient has had an office visit with the authorizing provider or a provider within the authorizing providers department within the previous 12 mos or has a future within next 30 days. See \"Patient Info\" tab in inbasket, or \"Choose Columns\" in Meds & Orders section of the refill encounter.              Passed - Medication is active on med list             "

## 2020-03-19 RX ORDER — ALBUTEROL SULFATE 0.83 MG/ML
SOLUTION RESPIRATORY (INHALATION)
Qty: 75 ML | Refills: 0 | Status: SHIPPED | OUTPATIENT
Start: 2020-03-19 | End: 2020-07-02

## 2020-03-19 NOTE — TELEPHONE ENCOUNTER
Prescription approved per [facility] according to the [facility] Refill Protocol.    Alba Flood RN

## 2020-03-30 VITALS — WEIGHT: 295 LBS | HEIGHT: 72 IN | BODY MASS INDEX: 39.96 KG/M2

## 2020-03-30 ASSESSMENT — MIFFLIN-ST. JEOR: SCORE: 2086.11

## 2020-03-30 NOTE — PROGRESS NOTES
"Erich Craven is a 79 year old male who is being evaluated via a billable telephone visit.      The patient has been notified of following:     \"This telephone visit will be conducted via a call between you and your physician/provider. We have found that certain health care needs can be provided without the need for a physical exam.  This service lets us provide the care you need with a short phone conversation.  If a prescription is necessary we can send it directly to your pharmacy.  If lab work is needed we can place an order for that and you can then stop by our lab to have the test done at a later time.    If during the course of the call the physician/provider feels a telephone visit is not appropriate, you will not be charged for this service.\"     Patient has given verbal consent for Telephone visit?  Yes    Erich Craven complains of    Chief Complaint   Patient presents with     CPAP Follow Up       I have reviewed and updated the patient's Past Medical History, Social History, Family History and Medication List.    ALLERGIES  No known allergies    CPAP Follow-Up Visit:    Date on this visit: 3/31/2020    Erich Craven has a visit today for follow-up of his CPAP use for moderate RIN and hypoxemia. He was initially seen at the [facility] for previously diagnosed RIN and waking up feeling exhausted, since he moved to [place] last December.  His medical history is significant for paroxysmal atrial fibrillation, cardiac pacemaker, HFpEF, CAD, HTN, morbid obesity, PE (2012), COPD, hyperlipidemia, hypothyroidism, elevated right diaphragm.     AHI was 18.9/hr (4.9% centrals with a tendency towards periodic breathing), with desaturations down to 74%. He spent 109.6 minutes below 90% SpO2 and 68.6 minutes below 89%. RDI 23.9/hr.  REM RDI 32.7/hr.  Supine RDI 71.1/hr. His non-supine RDI was 18/hr.  Periodic Limb Movement Index 84.9/hour, 17.3/hr were associated with arousals.  CO2 ranged from 42 " to 45 mmHg, not suggestive of hypoventilation. His O2 baseline was 87-90% for the first half of the night. One lpm O2 was added to bring his SpO2 to 90% or better.      Titration results:  CPAP titration: CPAP was titrated to 9 cm with elimination of apneas, hypopneas and desaturations. Supine REM was noted at this pressure. BiPAP 9/4 was attempted briefly in REM, but the patient had significant desaturations on BiPAP. His O2 saturation was 89-91%.      Oximetry was ordered In January to assess efficacy of CPAP and O2, but it was not performed yet. He still wakes frequently to urinate due to diuretics. He notices feeling less in a fog when he wakes. He cleans the mask weekly.    PAP machine: [employer]. Pressure settings: 9-15 cm with 2 lpm O2 bled in to CPAP and with activity in the daytime via nasal cannula.    The compliance data shows that the patient used the CPAP for 29/30 nights, 85% of nights for >4 hours.  The 90th% pressure is 12 cm.  The average time in large leak is 0.1% of the night.  The average nightly usage is 6-6.5 hours.  The average AHI is <3/hr.       Interface:  Mask: AirFit F20 full face mask  Chin strap: No  Leak: Yes- causes some dry eye.  Using Humidifier: yes  Condensation in hose or mask: No     Difficulties with therapy:    [-] Snoring with CPAP:   [-] Difficulty tolerating the pressure: uses ramp at 7 cm and that helps  [-] Epistaxis/dry nose:   [-] Nasal congestion:  [-] Dry mouth:  [-] Mouth breathing: unsure  [-] Pain/skin breakdown:      Improvements noted with CPAP:   [+] waking up more refreshed  [+] sleeping better with less arousals  [-] nocturia improved   [+] improved energy level during the day    He says the machine is definitely helping. He tries to get outside to walk. His oxygen is usually 90-92% at rest when he spot checks. It can be a little lower after exercise and he will do some deep breathing to get it back up.     He has not had much change in his sleep schedule.  He says he goes through bouts of slightly lower energy in the daytime. He is not really sure why. He may nap 3-4 times per week.    Weight change since sleep study:     Past medical/surgical history, family history, social history, medications and allergies were reviewed.      Problem List:  Patient Active Problem List    Diagnosis Date Noted     Anxiety 03/02/2020     Priority: Medium     Patient is followed by GIA FLORES for ongoing prescription of benzodiazepines.  All refills should be approved by this provider, or covering partner.    Medication(s): Pending Prescriptions:                       Disp   Refills    clonazePAM (KLONOPIN) 0.5 MG tablet [Phar*60 tab*0            Sig: TAKE 1 TABLET BY MOUTH TWICE A DAY AS NEEDED    .   Maximum quantity per month: 60  Clinic visit frequency required: Q 6  months     Controlled substance agreement on file: No  Benzodiazepine use reviewed by psychiatry:      [facility] website verification:  none  [email or web address]           Status post coronary angiogram 11/05/2019     Priority: Medium     Heart failure with preserved ejection fraction, unspecified HF chronicity (H) 10/10/2019     Priority: Medium     Added automatically from request for surgery [id number]       Muscle tension headache 05/21/2019     Priority: Medium     PE (pulmonary thromboembolism) (H)      Priority: Medium     HTN (hypertension)      Priority: Medium     Chronic diastolic heart failure (H)      Priority: Medium     Dyspnea      Priority: Medium     Fatigue      Priority: Medium     RIN (obstructive sleep apnea)      Priority: Medium     COPD (chronic obstructive pulmonary disease) (H)      Priority: Medium     Hypothyroidism      Priority: Medium     Paroxysmal A-fib (H)      Priority: Medium     HLD (hyperlipidemia)      Priority: Medium     Lymphedema      Priority: Medium     (HFpEF) heart failure with preserved ejection fraction (H)      Priority: Medium     Essential  hypertension      Priority: Medium     Cardiac pacemaker in situ      Priority: Medium     DVT (deep venous thrombosis) (H)      Priority: Medium     CAD (coronary artery disease)      Priority: Medium     PCI w/ [facility] in 2014       Obesity, Class III, BMI 40-49.9 (morbid obesity) (H)      Priority: Medium     Morbid obesity (H) 04/14/2019     Priority: Medium        Impression/Plan:    (G47.33) RIN (obstructive sleep apnea), (R09.02) Hypoxemia  (primary encounter diagnosis)  Comment: Jaun is doing well with CPAP and O2. His main issue is dry eyes/mask leak into eyes. He cleans the mask weekly. He does not use the oxygen when exercising. He spot checks his oxygen and it is 90% or better at rest. He does deep breathing after exercise to get his O2 up. Oximetry was ordered at the last visit, but not performed yet, possibly due to COVID. He notices feeling tired on some days, not all. He does notice a benefit to being on CPAP and he is using it regularly.  Plan: Continue auto CPAP 9-15 cm with 2 lpm O2. We talked about ways to reduce leak including; nasal gel pads, washing the mask daily, using a piece of cotton (from an old shirt) at the nasal bridge, or trying a different mask. I will have our staff schedule oximetry with CPAP and O2. We talked about the importance of keeping a consistent schedule, aiming for about 8 hours in bed. He was advised to minimize napping, but keep it short (30 minutes) and in the early afternoon if he must nap.    He will follow up with me in about 1 year(s).     16 minutes spent with patient, all of which were spent face-to-face counseling, consulting, coordinating plan of care.      Bennett Goltz, PA-C    CC: No ref. provider found

## 2020-03-31 ENCOUNTER — VIRTUAL VISIT (OUTPATIENT)
Dept: SLEEP MEDICINE | Facility: CLINIC | Age: 80
End: 2020-03-31
Payer: MEDICARE

## 2020-03-31 DIAGNOSIS — R09.02 HYPOXEMIA: ICD-10-CM

## 2020-03-31 DIAGNOSIS — G47.33 OSA (OBSTRUCTIVE SLEEP APNEA): Primary | ICD-10-CM

## 2020-03-31 PROCEDURE — 99442 ZZC PHYSICIAN TELEPHONE EVALUATION 11-20 MIN: CPT | Performed by: PHYSICIAN ASSISTANT

## 2020-04-06 DIAGNOSIS — F43.21 ADJUSTMENT DISORDER WITH DEPRESSED MOOD: ICD-10-CM

## 2020-04-06 NOTE — TELEPHONE ENCOUNTER
clonazePAM (KLONOPIN) 0.5 MG tablet  60 tablet  0  3/2/2020   No    Sig: TAKE 1 TABLET BY MOUTH TWICE A DAY AS NEEDED     Associated Diagnoses   Adjustment disorder with depressed mood [F43.21]           Last Written Prescription Date:  03/02/2020  Last Fill Quantity: 60,  # refills: 0   Last office visit: 2/10/2020 with prescribing provider:     Future Office Visit:   Next 5 appointments (look out 90 days)    May 28, 2020  7:45 AM CDT  Return Visit with Brent Portillo MD  [facility] [address] [place] [id number] [phone] OPT 2                   Routing refill request to provider for review/approval because:  Drug not on the [facility], [facility] or [facility] refill protocol or controlled substance

## 2020-04-07 NOTE — TELEPHONE ENCOUNTER
Routing refill request to provider for review/approval because:  Drug not on the [facility] refill protocol   RN unable to access  under provider    CHARBEL Contreras, RN  [employer] [facility]

## 2020-04-08 RX ORDER — CLONAZEPAM 0.5 MG/1
TABLET ORAL
Qty: 60 TABLET | Refills: 0 | Status: SHIPPED | OUTPATIENT
Start: 2020-04-08 | End: 2020-05-08

## 2020-04-24 DIAGNOSIS — Z95.0 CARDIAC PACEMAKER IN SITU: Primary | ICD-10-CM

## 2020-05-04 ENCOUNTER — TELEPHONE (OUTPATIENT)
Dept: FAMILY MEDICINE | Facility: CLINIC | Age: 80
End: 2020-05-04

## 2020-05-04 DIAGNOSIS — F43.21 ADJUSTMENT DISORDER WITH DEPRESSED MOOD: ICD-10-CM

## 2020-05-05 NOTE — TELEPHONE ENCOUNTER
clonazePAM (KLONOPIN) 0.5 MG tablet  60 tablet  0  4/8/2020            Last Written Prescription Date:  04/08/2020  Last Fill Quantity: 60,   # refills: 0  Last Office Visit: 02/10/2020  Future Office visit:   Unknown    Routing refill request to provider for review/approval because:  Drug not on the [facility], [facility] or [facility] refill protocol or controlled substance

## 2020-05-06 NOTE — TELEPHONE ENCOUNTER
Pt is calling in to check on the status of this medication request.  He will be out of medication on Friday.  Please expedite.

## 2020-05-06 NOTE — TELEPHONE ENCOUNTER
Routing refill request to provider for review/approval because:  Drug not on the [facility] refill protocol     Last [facility] website verification:  05/06/20     Last Written Prescription Date:  4/6/2020  Last Fill Quantity: 60,  # refills: 0   Last office visit: 2/10/2020 with prescribing provider:  Dalia   Future Office Visit:                Please review and authorize if appropriate,     Thank you,   Carmen GRIMALDO RN

## 2020-05-07 NOTE — TELEPHONE ENCOUNTER
Patient called again regarding the RX Clonazepam refill request.      Will route again to PCP--high priority.     Lanny GARNICA RN,BSN

## 2020-05-08 ENCOUNTER — ANTICOAGULATION THERAPY VISIT (OUTPATIENT)
Dept: ANTICOAGULATION | Facility: CLINIC | Age: 80
End: 2020-05-08
Payer: MEDICARE

## 2020-05-08 DIAGNOSIS — I82.4Z9 DEEP VEIN THROMBOSIS (DVT) OF DISTAL VEIN OF LOWER EXTREMITY, UNSPECIFIED CHRONICITY, UNSPECIFIED LATERALITY (H): Primary | ICD-10-CM

## 2020-05-08 DIAGNOSIS — I26.99 PE (PULMONARY THROMBOEMBOLISM) (H): ICD-10-CM

## 2020-05-08 DIAGNOSIS — I48.0 PAROXYSMAL A-FIB (H): ICD-10-CM

## 2020-05-08 LAB
CAPILLARY BLOOD COLLECTION: NORMAL
INR PPP: 2.8 (ref 0.86–1.14)

## 2020-05-08 PROCEDURE — 85610 PROTHROMBIN TIME: CPT | Performed by: INTERNAL MEDICINE

## 2020-05-08 PROCEDURE — 36416 COLLJ CAPILLARY BLOOD SPEC: CPT | Performed by: INTERNAL MEDICINE

## 2020-05-08 PROCEDURE — 99207 ZZC NO CHARGE NURSE ONLY: CPT

## 2020-05-08 RX ORDER — CLONAZEPAM 0.5 MG/1
TABLET ORAL
Qty: 60 TABLET | Refills: 0 | Status: SHIPPED | OUTPATIENT
Start: 2020-05-08 | End: 2020-06-03

## 2020-05-08 NOTE — PROGRESS NOTES
ANTICOAGULATION FOLLOW-UP CLINIC VISIT    Patient Name:  Erich Craven  Date:  2020  Contact Type:  Telephone    SUBJECTIVE:  Patient Findings     Comments:   The patient was assessed for diet, medication, and activity level changes, missed or extra doses, bruising or bleeding, with no problem findings.          Clinical Outcomes     Comments:   The patient was assessed for diet, medication, and activity level changes, missed or extra doses, bruising or bleeding, with no problem findings.             OBJECTIVE    INR   Date Value Ref Range Status   2020 2.80 (H) 0.86 - 1.14 Final     Comment:     This test is intended for monitoring Coumadin therapy.  Results are not   accurate in patients with prolonged INR due to factor deficiency.         ASSESSMENT / PLAN  INR assessment THER    Recheck INR In: 4 WEEKS    INR Location Clinic      Anticoagulation Summary  As of 2020    INR goal:   2.0-3.0   TTR:   77.6 % (1 y)   Prior goal:   2.0-3.0   INR used for dosin.80 (2020)   Warfarin maintenance plan:   7.5 mg (5 mg x 1.5) every Wed; 5 mg (5 mg x 1) all other days   Full warfarin instructions:   7.5 mg every Wed; 5 mg all other days   Weekly warfarin total:   37.5 mg   No change documented:   Peggy Ricci RN   Plan last modified:   Delia Jang RN (1/15/2020)   Next INR check:   2020   Target end date:   Indefinite    Indications    PE (pulmonary thromboembolism) (H) [I26.99]  Paroxysmal A-fib (H) [I48.0]  Deep vein thrombosis (DVT) of distal vein of lower extremity  unspecified chronicity  unspecified laterality (H) [I82.4Z9]             Anticoagulation Episode Summary     INR check location:       Preferred lab:       Send INR reminders to:   SERENITY BECERRA    Comments:         Anticoagulation Care Providers     Provider Role Specialty Phone number    Dontrell Gómez MD Referring Internal Medicine [phone]            See the Encounter Report to view Anticoagulation Flowsheet  and Dosing Calendar (Go to Encounters tab in chart review, and find the Anticoagulation Therapy Visit)        Peggy Ricci RN

## 2020-05-19 ENCOUNTER — ANCILLARY PROCEDURE (OUTPATIENT)
Dept: CARDIOLOGY | Facility: CLINIC | Age: 80
End: 2020-05-19
Attending: INTERNAL MEDICINE
Payer: MEDICARE

## 2020-05-19 DIAGNOSIS — I49.5 SICK SINUS SYNDROME (H): Primary | ICD-10-CM

## 2020-05-19 DIAGNOSIS — Z95.0 CARDIAC PACEMAKER IN SITU: ICD-10-CM

## 2020-05-19 PROCEDURE — 93296 REM INTERROG EVL PM/IDS: CPT | Performed by: INTERNAL MEDICINE

## 2020-05-19 PROCEDURE — 93294 REM INTERROG EVL PM/LDLS PM: CPT | Performed by: INTERNAL MEDICINE

## 2020-05-20 ENCOUNTER — TELEPHONE (OUTPATIENT)
Dept: LAB | Facility: CLINIC | Age: 80
End: 2020-05-20

## 2020-05-20 NOTE — TELEPHONE ENCOUNTER
Wellness Screening Tool    Symptom Screening:    Do you have one of the following new symptoms:      Fever or reported chills?  No    A new cough (started within the past 14 days)? No    Shortness of breath (started within the past 14 days)? No    Nausea, vomiting or diarrhea?  No    Within the past 3 weeks, have you been exposed to someone with a known positive illness below?      COVID - 19 (known or suspected)  no    Chicken pox? no    Measles? no    Pertussis?  No          Patient notified of visitor restriction: Yes  Patient informed to wear a mask: YES    Patient's appointment status: Patient will be seen in clinic as scheduled on 05/21/20

## 2020-05-21 DIAGNOSIS — I50.30 HEART FAILURE WITH PRESERVED EJECTION FRACTION, UNSPECIFIED HF CHRONICITY (H): ICD-10-CM

## 2020-05-21 LAB
ANION GAP SERPL CALCULATED.3IONS-SCNC: 10.2 MMOL/L (ref 6–17)
BUN SERPL-MCNC: 29 MG/DL (ref 7–30)
CALCIUM SERPL-MCNC: 10.7 MG/DL (ref 8.5–10.5)
CHLORIDE SERPL-SCNC: 93 MMOL/L (ref 98–107)
CO2 SERPL-SCNC: 39 MMOL/L (ref 23–29)
CREAT SERPL-MCNC: 1.23 MG/DL (ref 0.7–1.3)
GFR SERPL CREATININE-BSD FRML MDRD: 56 ML/MIN/{1.73_M2}
GLUCOSE SERPL-MCNC: 157 MG/DL (ref 70–105)
MDC_IDC_EPISODE_DTM: NORMAL
MDC_IDC_EPISODE_DURATION: 14 S
MDC_IDC_EPISODE_DURATION: 18 S
MDC_IDC_EPISODE_DURATION: 5 S
MDC_IDC_EPISODE_DURATION: 6 S
MDC_IDC_EPISODE_DURATION: 7 S
MDC_IDC_EPISODE_DURATION: 8 S
MDC_IDC_EPISODE_DURATION: 8 S
MDC_IDC_EPISODE_DURATION: 9 S
MDC_IDC_EPISODE_ID: NORMAL
MDC_IDC_EPISODE_TYPE: NORMAL
MDC_IDC_LEAD_IMPLANT_DT: NORMAL
MDC_IDC_LEAD_IMPLANT_DT: NORMAL
MDC_IDC_LEAD_LOCATION: NORMAL
MDC_IDC_LEAD_LOCATION: NORMAL
MDC_IDC_LEAD_LOCATION_DETAIL_1: NORMAL
MDC_IDC_LEAD_LOCATION_DETAIL_1: NORMAL
MDC_IDC_LEAD_MFG: NORMAL
MDC_IDC_LEAD_MFG: NORMAL
MDC_IDC_LEAD_MODEL: NORMAL
MDC_IDC_LEAD_MODEL: NORMAL
MDC_IDC_LEAD_POLARITY_TYPE: NORMAL
MDC_IDC_LEAD_POLARITY_TYPE: NORMAL
MDC_IDC_LEAD_SERIAL: NORMAL
MDC_IDC_LEAD_SERIAL: NORMAL
MDC_IDC_MSMT_BATTERY_DTM: NORMAL
MDC_IDC_MSMT_BATTERY_REMAINING_LONGEVITY: 36 MO
MDC_IDC_MSMT_BATTERY_REMAINING_PERCENTAGE: 61 %
MDC_IDC_MSMT_BATTERY_STATUS: NORMAL
MDC_IDC_MSMT_LEADCHNL_RA_IMPEDANCE_VALUE: 493 OHM
MDC_IDC_MSMT_LEADCHNL_RA_PACING_THRESHOLD_AMPLITUDE: 0.4 V
MDC_IDC_MSMT_LEADCHNL_RA_PACING_THRESHOLD_PULSEWIDTH: 0.4 MS
MDC_IDC_MSMT_LEADCHNL_RV_IMPEDANCE_VALUE: 413 OHM
MDC_IDC_MSMT_LEADCHNL_RV_PACING_THRESHOLD_AMPLITUDE: 2.1 V
MDC_IDC_MSMT_LEADCHNL_RV_PACING_THRESHOLD_PULSEWIDTH: 0.4 MS
MDC_IDC_PG_IMPLANT_DTM: NORMAL
MDC_IDC_PG_MFG: NORMAL
MDC_IDC_PG_MODEL: NORMAL
MDC_IDC_PG_SERIAL: NORMAL
MDC_IDC_PG_TYPE: NORMAL
MDC_IDC_SESS_CLINIC_NAME: NORMAL
MDC_IDC_SESS_DTM: NORMAL
MDC_IDC_SESS_TYPE: NORMAL
MDC_IDC_SET_BRADY_AT_MODE_SWITCH_MODE: NORMAL
MDC_IDC_SET_BRADY_AT_MODE_SWITCH_RATE: 150 {BEATS}/MIN
MDC_IDC_SET_BRADY_LOWRATE: 60 {BEATS}/MIN
MDC_IDC_SET_BRADY_MAX_SENSOR_RATE: 130 {BEATS}/MIN
MDC_IDC_SET_BRADY_MAX_TRACKING_RATE: 130 {BEATS}/MIN
MDC_IDC_SET_BRADY_MODE: NORMAL
MDC_IDC_SET_BRADY_PAV_DELAY_HIGH: 220 MS
MDC_IDC_SET_BRADY_PAV_DELAY_LOW: 250 MS
MDC_IDC_SET_BRADY_SAV_DELAY_HIGH: 220 MS
MDC_IDC_SET_BRADY_SAV_DELAY_LOW: 250 MS
MDC_IDC_SET_LEADCHNL_RA_PACING_AMPLITUDE: 2 V
MDC_IDC_SET_LEADCHNL_RA_PACING_POLARITY: NORMAL
MDC_IDC_SET_LEADCHNL_RA_PACING_PULSEWIDTH: 0.4 MS
MDC_IDC_SET_LEADCHNL_RA_SENSING_ADAPTATION_MODE: NORMAL
MDC_IDC_SET_LEADCHNL_RA_SENSING_POLARITY: NORMAL
MDC_IDC_SET_LEADCHNL_RA_SENSING_SENSITIVITY: 0.5 MV
MDC_IDC_SET_LEADCHNL_RV_PACING_AMPLITUDE: 2.6 V
MDC_IDC_SET_LEADCHNL_RV_PACING_CAPTURE_MODE: NORMAL
MDC_IDC_SET_LEADCHNL_RV_PACING_POLARITY: NORMAL
MDC_IDC_SET_LEADCHNL_RV_PACING_PULSEWIDTH: 0.4 MS
MDC_IDC_SET_LEADCHNL_RV_SENSING_ADAPTATION_MODE: NORMAL
MDC_IDC_SET_LEADCHNL_RV_SENSING_POLARITY: NORMAL
MDC_IDC_SET_LEADCHNL_RV_SENSING_SENSITIVITY: 1 MV
MDC_IDC_SET_ZONE_DETECTION_INTERVAL: 375 MS
MDC_IDC_SET_ZONE_TYPE: NORMAL
MDC_IDC_SET_ZONE_VENDOR_TYPE: NORMAL
MDC_IDC_STAT_AT_BURDEN_PERCENT: 1 %
MDC_IDC_STAT_AT_DTM_END: NORMAL
MDC_IDC_STAT_AT_DTM_START: NORMAL
MDC_IDC_STAT_BRADY_DTM_END: NORMAL
MDC_IDC_STAT_BRADY_DTM_START: NORMAL
MDC_IDC_STAT_BRADY_RA_PERCENT_PACED: 3 %
MDC_IDC_STAT_BRADY_RV_PERCENT_PACED: 0 %
MDC_IDC_STAT_EPISODE_RECENT_COUNT: 0
MDC_IDC_STAT_EPISODE_RECENT_COUNT: 1
MDC_IDC_STAT_EPISODE_RECENT_COUNT: 282
MDC_IDC_STAT_EPISODE_RECENT_COUNT: 33
MDC_IDC_STAT_EPISODE_RECENT_COUNT_DTM_END: NORMAL
MDC_IDC_STAT_EPISODE_RECENT_COUNT_DTM_START: NORMAL
MDC_IDC_STAT_EPISODE_TYPE: NORMAL
MDC_IDC_STAT_EPISODE_VENDOR_TYPE: NORMAL
NT-PROBNP SERPL-MCNC: 32 PG/ML (ref 0–450)
POTASSIUM SERPL-SCNC: 3.2 MMOL/L (ref 3.5–5.1)
SODIUM SERPL-SCNC: 139 MMOL/L (ref 136–145)

## 2020-05-21 PROCEDURE — 83880 ASSAY OF NATRIURETIC PEPTIDE: CPT | Performed by: INTERNAL MEDICINE

## 2020-05-21 PROCEDURE — 36415 COLL VENOUS BLD VENIPUNCTURE: CPT | Performed by: INTERNAL MEDICINE

## 2020-05-21 PROCEDURE — 80048 BASIC METABOLIC PNL TOTAL CA: CPT | Performed by: INTERNAL MEDICINE

## 2020-05-28 ENCOUNTER — VIRTUAL VISIT (OUTPATIENT)
Dept: CARDIOLOGY | Facility: CLINIC | Age: 80
End: 2020-05-28
Payer: MEDICARE

## 2020-05-28 DIAGNOSIS — I10 ESSENTIAL HYPERTENSION: ICD-10-CM

## 2020-05-28 DIAGNOSIS — I50.30 HEART FAILURE WITH PRESERVED EJECTION FRACTION, UNSPECIFIED HF CHRONICITY (H): ICD-10-CM

## 2020-05-28 DIAGNOSIS — I49.5 SICK SINUS SYNDROME (H): ICD-10-CM

## 2020-05-28 DIAGNOSIS — G47.33 OSA (OBSTRUCTIVE SLEEP APNEA): ICD-10-CM

## 2020-05-28 DIAGNOSIS — Z95.0 CARDIAC PACEMAKER IN SITU: Primary | ICD-10-CM

## 2020-05-28 DIAGNOSIS — E78.49 OTHER HYPERLIPIDEMIA: ICD-10-CM

## 2020-05-28 DIAGNOSIS — I48.0 PAROXYSMAL ATRIAL FIBRILLATION (H): ICD-10-CM

## 2020-05-28 DIAGNOSIS — I25.10 CORONARY ARTERY DISEASE INVOLVING NATIVE CORONARY ARTERY OF NATIVE HEART WITHOUT ANGINA PECTORIS: ICD-10-CM

## 2020-05-28 DIAGNOSIS — E66.01 MORBID OBESITY (H): ICD-10-CM

## 2020-05-28 PROCEDURE — 99443: CPT | Performed by: INTERNAL MEDICINE

## 2020-05-28 RX ORDER — ACETAMINOPHEN 500 MG
1000 TABLET ORAL 4 TIMES DAILY PRN
Status: ON HOLD | COMMUNITY
End: 2022-09-19

## 2020-05-28 NOTE — PROGRESS NOTES
"Erich Craven is a 80 year old male who is being evaluated via a billable telephone visit.      The patient has been notified of following:     \"This telephone visit will be conducted via a call between you and your physician/provider. We have found that certain health care needs can be provided without the need for a physical exam.  This service lets us provide the care you need with a short phone conversation.  If a prescription is necessary we can send it directly to your pharmacy.  If lab work is needed we can place an order for that and you can then stop by our lab to have the test done at a later time.    Telephone visits are billed at different rates depending on your insurance coverage. During this emergency period, for some insurers they may be billed the same as an in-person visit.  Please reach out to your insurance provider with any questions.    If during the course of the call the physician/provider feels a telephone visit is not appropriate, you will not be charged for this service.\"    BP:  N/A  HR: 84  Weight:  295lb  O2-low 91% room air this morning, wearing O2-2LPM PRN  Review Of Systems  Skin: NEGATIVE  Eyes:Ears/Nose/Throat: NEGATIVE  Respiratory:COPD, SOB, sleep apnea, CPAP with O2-2LPM at night, O2-2LPM, has slight wheezing  Cardiovascular: lightheadedness, energy level low, edema L foot,   Gastrointestinal: NEGATIVE  Genitourinary: prostate problem   Musculoskeletal: arthritis, R shoulder pain  Neurologic: NEGATIVE  Psychiatric: NEGATIVE  Hematologic/Lymphatic/Immunologic: NEGATIVE  Endocrine: thyroid disease    Kaylan Gregory LPN    Patient has given verbal consent for Telephone visit?  Yes    What phone number would you like to be contacted at?  [phone]  How would you like to obtain your AVS? Mail a copy    Phone call duration: 22 minutes    Brent Portillo MD, MD      "

## 2020-05-28 NOTE — LETTER
5/28/2020    Dontrell Gómez MD  [address] [facility] [address]  [place] [id number]    RE: Erich Craven       Dear Colleague,    I had the pleasure of seeing Erich Craven in the [facility].    Erich Craven is a 80 year old male who is being evaluated via a billable telephone visit.      HISTORY OF PRESENT ILLNESS:  It is my pleasure to see your patient Erich Craven.  Mr. Craven is an 80-year-old patient with a history of coronary artery disease with stenting of the proximal left anterior descending artery and also stenting of the proximal to mid right coronary artery in 2014.  He has a history of conduction system disease, for which he had a permanent pacemaker implanted.  He has a history, also, of obstructive sleep apnea, for which he is using a CPAP.  He has paroxysmal atrial fibrillation.  He has a past history of a pulmonary embolism.  He is on chronic anticoagulation therapy for atrial fibrillation and obstructive sleep apnea.  He also has a history of morbid obesity.  He has COPD.  If you remember, he did have a right heart catheterization performed on 11/05 of last year, and this showed that his pulmonary artery wedge pressures were normal and showed that his mean pulmonary artery pressures were also close to normal.  PA pressure was 36 systolic and 22 diastolic with a mean pulmonary artery pressure of 27 mmHg.  The mean wedge pressure was 13 mmHg.  This showed that the patient's peripheral edema and shortness of breath were not due to congestive heart failure.  He has significant peripheral edema, which is possibly due to his morbid obesity, but could also be related to venous insufficiency, but it is certainly not due to heart failure.      With that background in mind, he is doing relatively well.  He does get some shortness of breath on exertion, which is probably a combination of COPD and morbid obesity.  He does find that nebulizers help him with that.  He also uses  oxygen at nighttime with the CPAP, but also during the day he will use oxygen intermittently.  He still has peripheral edema.  He was referred to Lymphedema [facility] from our clinic.        Sleep study was performed on 09/22/2019 showing moderate obstructive and central sleep apnea at baseline.  He is using CPAP, as I mentioned above.  His blood pressure today could not be measured, but his blood pressure on 02/10/2020 was normal at 110/63.  His most recent basic metabolic profile was drawn on 05/21 of this year, and this showed his sodium was 139, but his potassium was low at 3.2.  His BUN was 29, creatinine was 1.23, and his GFR was 56.  He realized that instead of taking 2 tablets twice a day of the 10 mEq potassium chloride pills, he was only taking 1 tablet twice a day, which probably accounts for his potassium being on the lower side.  His renal function was mildly reduced with a BUN of 29 and a creatinine of 1.23.      Interrogation of his permanent pacemaker was performed approximately a week ago on 05/19/2020.  He was found to have 4 atrial tachycardia or atrial fibrillation episodes.  Two of the recordings showed PAT, and one showed an atrial arrhythmia lasting 7 seconds.  The PAT episodes only lasted 6-9 seconds.  He was entirely asymptomatic with this.  Nine nonsustained ventricular tachycardia episodes were logged, but these appeared to be 1:1 PAT rather than true ventricular arrhythmias.  The patient is not complaining of any chest pains or chest pressure.  The patient has had no syncope or near syncope.      IMPRESSION:   1.  Coronary artery disease.  The patient is asymptomatic with respect to coronary artery disease and status post stenting of both the LAD and right coronary arteries as described above.   2.  History of diastolic heart failure.  Right heart catheterization, however, last year did not show raised pulmonary artery wedge pressures or significantly raised pulmonary artery pressures,  indicating that the patient's shortness of breath and peripheral edema were not due to heart failure.   3.  Chronic obstructive pulmonary disease.  This is most likely the cause for his shortness of breath along with morbid obesity.   4.  Morbid obesity.   5.  Paroxysmal atrial fibrillation and paroxysmal atrial tachycardia.  These episodes are very infrequent based upon the interrogation of the pacemaker.   6.  Normally functioning permanent pacemaker for high-grade conduction system disease.   7.  Hypokalemia.  This is mainly due to the fact that he was taking half the dose of potassium that he was supposed to be taking.  He is now doubling the dose.  This should bring his potassium levels back to normal.      PLAN:  We will continue the patient on his present medications.  At this stage, he does not want to come in for any blood testing because of the COVID-19 pandemic, and he would be at significant risk for poor outcomes if he did contract the virus.  I have made arrangements for him to come back in 01/2021.  He will then follow up with me in a year's time.  We will check the basic metabolic profile on both of these visits, and we will also check his lipid profile with liver function testing.  [facility] will follow his pacemaker independently.  As always, the patient has been told to contact us if he has any questions or any concerns.      This was a 22 minute telephone visit.       Thank you for allowing me to participate in the care of your patient.    Sincerely,     Brent Portillo MD, MD     [facility]

## 2020-05-28 NOTE — PROGRESS NOTES
Service Date: 05/28/2020      TELEPHONE VISIT       This was a telephone visit because of the COVID-19 pandemic.      REFERRING PHYSICIAN:  Dontrell Gómez MD       HISTORY OF PRESENT ILLNESS:  It is my pleasure to see your patient Erich Craven.  Mr. Craven is an 80-year-old patient with a history of coronary artery disease with stenting of the proximal left anterior descending artery and also stenting of the proximal to mid right coronary artery in 2014.  He has a history of conduction system disease, for which he had a permanent pacemaker implanted.  He has a history, also, of obstructive sleep apnea, for which he is using a CPAP.  He has paroxysmal atrial fibrillation.  He has a past history of a pulmonary embolism.  He is on chronic anticoagulation therapy for atrial fibrillation and obstructive sleep apnea.  He also has a history of morbid obesity.  He has COPD.  If you remember, he did have a right heart catheterization performed on 11/05 of last year, and this showed that his pulmonary artery wedge pressures were normal and showed that his mean pulmonary artery pressures were also close to normal.  PA pressure was 36 systolic and 22 diastolic with a mean pulmonary artery pressure of 27 mmHg.  The mean wedge pressure was 13 mmHg.  This showed that the patient's peripheral edema and shortness of breath were not due to congestive heart failure.  He has significant peripheral edema, which is possibly due to his morbid obesity, but could also be related to venous insufficiency, but it is certainly not due to heart failure.      With that background in mind, he is doing relatively well.  He does get some shortness of breath on exertion, which is probably a combination of COPD and morbid obesity.  He does find that nebulizers help him with that.  He also uses oxygen at nighttime with the CPAP, but also during the day he will use oxygen intermittently.  He still has peripheral edema.  He was referred to [facility] from our clinic.        Sleep study was performed on 09/22/2019 showing moderate obstructive and central sleep apnea at baseline.  He is using CPAP, as I mentioned above.  His blood pressure today could not be measured, but his blood pressure on 02/10/2020 was normal at 110/63.  His most recent basic metabolic profile was drawn on 05/21 of this year, and this showed his sodium was 139, but his potassium was low at 3.2.  His BUN was 29, creatinine was 1.23, and his GFR was 56.  He realized that instead of taking 2 tablets twice a day of the 10 mEq potassium chloride pills, he was only taking 1 tablet twice a day, which probably accounts for his potassium being on the lower side.  His renal function was mildly reduced with a BUN of 29 and a creatinine of 1.23.      Interrogation of his permanent pacemaker was performed approximately a week ago on 05/19/2020.  He was found to have 4 atrial tachycardia or atrial fibrillation episodes.  Two of the recordings showed PAT, and one showed an atrial arrhythmia lasting 7 seconds.  The PAT episodes only lasted 6-9 seconds.  He was entirely asymptomatic with this.  Nine nonsustained ventricular tachycardia episodes were logged, but these appeared to be 1:1 PAT rather than true ventricular arrhythmias.  The patient is not complaining of any chest pains or chest pressure.  The patient has had no syncope or near syncope.      IMPRESSION:   1.  Coronary artery disease.  The patient is asymptomatic with respect to coronary artery disease and status post stenting of both the LAD and right coronary arteries as described above.   2.  History of diastolic heart failure.  Right heart catheterization, however, last year did not show raised pulmonary artery wedge pressures or significantly raised pulmonary artery pressures, indicating that the patient's shortness of breath and peripheral edema were not due to heart failure.   3.  Chronic obstructive pulmonary disease.  This is most  likely the cause for his shortness of breath along with morbid obesity.   4.  Morbid obesity.   5.  Paroxysmal atrial fibrillation and paroxysmal atrial tachycardia.  These episodes are very infrequent based upon the interrogation of the pacemaker.   6.  Normally functioning permanent pacemaker for high-grade conduction system disease.   7.  Hypokalemia.  This is mainly due to the fact that he was taking half the dose of potassium that he was supposed to be taking.  He is now doubling the dose.  This should bring his potassium levels back to normal.      PLAN:  We will continue the patient on his present medications.  At this stage, he does not want to come in for any blood testing because of the COVID-19 pandemic, and he would be at significant risk for poor outcomes if he did contract the virus.  I have made arrangements for him to come back in 2021.  He will then follow up with me in a year's time.  We will check the basic metabolic profile on both of these visits, and we will also check his lipid profile with liver function testing.  [facility] will follow his pacemaker independently.  As always, the patient has been told to contact us if he has any questions or any concerns.      This was a 22 minute telephone visit.      cc:   Dontrell Gó[place] [address]         DAGO JAIMES MD, [facility]             D: 2020   T: 2020   MT: [email or web address]      Name:     KHUSHBOO NINO   MRN:      [id number]        Account:      [id number]   :      1940           Service Date: 2020      Document: [id number]

## 2020-06-01 DIAGNOSIS — F43.21 ADJUSTMENT DISORDER WITH DEPRESSED MOOD: ICD-10-CM

## 2020-06-03 NOTE — TELEPHONE ENCOUNTER
Routing refill request to provider for review/approval because:  Drug not on the [facility] refill protocol for RN authorization.    To PCP for review and sign.  Last fill 5/8/20.  Rx is dated 6/5/20 fill.  Rita York RN

## 2020-06-04 RX ORDER — CLONAZEPAM 0.5 MG/1
TABLET ORAL
Qty: 60 TABLET | Refills: 0 | Status: SHIPPED | OUTPATIENT
Start: 2020-06-05 | End: 2020-07-03

## 2020-07-02 ENCOUNTER — TELEPHONE (OUTPATIENT)
Dept: FAMILY MEDICINE | Facility: CLINIC | Age: 80
End: 2020-07-02

## 2020-07-02 NOTE — TELEPHONE ENCOUNTER
Erich,      We are contacting you because our records show you were due for an INR on 6/5.?   ?  We understand that this is a difficult time. In response to the COVID-19 [facility] has made several changes to help keep you safe. Some examples include limiting in person office visits to essential appointments only, wearing masks, and calling patients the day before appointments to screen for symptoms of illness.?   ?  INR testing is considered essential care during this time. There are potentially serious risks when taking warfarin without careful monitoring, and we want to make sure you stay safe. To minimize your time in clinic, we are now using lab only appointments for your INR test. Your anticoagulation nurse will now be calling you to discuss your result and dosing after you return home.  ?  Please call the INR clinic at [phone] to  set up an appointment. If there has been a change in your medications or provider, please let us know so we can update our records.  ?  Sincerely,  ?  [facility]

## 2020-07-02 NOTE — LETTER
Erich,      We are contacting you because our records show you were due for an INR on 6/5.?   ?  We understand that this is a difficult time. In response to the COVID-19 [facility] has made several changes to help keep you safe. Some examples include limiting in person office visits to essential appointments only, wearing masks, and calling patients the day before appointments to screen for symptoms of illness.?   ?  INR testing is considered essential care during this time. There are potentially serious risks when taking warfarin without careful monitoring, and we want to make sure you stay safe. To minimize your time in clinic, we are now using lab only appointments for your INR test. Your anticoagulation nurse will now be calling you to discuss your result and dosing after you return home.  ?  Please call the INR clinic at [phone] to  set up an appointment. If there has been a change in your medications or provider, please let us know so we can update our records.  ?  Sincerely,  ?  [facility]

## 2020-07-03 ENCOUNTER — VIRTUAL VISIT (OUTPATIENT)
Dept: FAMILY MEDICINE | Facility: CLINIC | Age: 80
End: 2020-07-03
Payer: MEDICARE

## 2020-07-03 DIAGNOSIS — J44.9 CHRONIC OBSTRUCTIVE PULMONARY DISEASE, UNSPECIFIED COPD TYPE (H): ICD-10-CM

## 2020-07-03 DIAGNOSIS — I10 ESSENTIAL HYPERTENSION: ICD-10-CM

## 2020-07-03 DIAGNOSIS — F43.21 ADJUSTMENT DISORDER WITH DEPRESSED MOOD: ICD-10-CM

## 2020-07-03 DIAGNOSIS — Z76.0 ENCOUNTER FOR MEDICATION REFILL: Primary | ICD-10-CM

## 2020-07-03 PROCEDURE — 99443 ZZC PHYSICIAN TELEPHONE EVALUATION 21-30 MIN: CPT | Performed by: INTERNAL MEDICINE

## 2020-07-03 RX ORDER — ALBUTEROL SULFATE 90 UG/1
2 AEROSOL, METERED RESPIRATORY (INHALATION) EVERY 4 HOURS PRN
Qty: 1 INHALER | Refills: 0 | Status: SHIPPED | OUTPATIENT
Start: 2020-07-03 | End: 2022-02-10

## 2020-07-03 RX ORDER — CLONAZEPAM 0.5 MG/1
TABLET ORAL
Qty: 60 TABLET | Refills: 0 | Status: SHIPPED | OUTPATIENT
Start: 2020-07-03 | End: 2020-07-31

## 2020-07-03 RX ORDER — DILTIAZEM HYDROCHLORIDE 300 MG/1
300 CAPSULE, COATED, EXTENDED RELEASE ORAL DAILY
Qty: 90 CAPSULE | Refills: 0 | Status: SHIPPED | OUTPATIENT
Start: 2020-07-03 | End: 2020-10-29

## 2020-07-03 RX ORDER — ALBUTEROL SULFATE 0.83 MG/ML
SOLUTION RESPIRATORY (INHALATION)
Qty: 75 ML | Refills: 3 | Status: SHIPPED | OUTPATIENT
Start: 2020-07-03 | End: 2021-05-13

## 2020-07-03 RX ORDER — CARVEDILOL 12.5 MG/1
12.5 TABLET ORAL 2 TIMES DAILY
Qty: 180 TABLET | Refills: 3 | Status: SHIPPED | OUTPATIENT
Start: 2020-07-03 | End: 2021-08-02

## 2020-07-03 NOTE — PROGRESS NOTES
"Erich Craven is a 80 year old male who is being evaluated via a billable telephone visit.      The patient has been notified of following:     \"This telephone visit will be conducted via a call between you and your physician/provider. We have found that certain health care needs can be provided without the need for a physical exam.  This service lets us provide the care you need with a short phone conversation.  If a prescription is necessary we can send it directly to your pharmacy.  If lab work is needed we can place an order for that and you can then stop by our lab to have the test done at a later time.    Telephone visits are billed at different rates depending on your insurance coverage. During this emergency period, for some insurers they may be billed the same as an in-person visit.  Please reach out to your insurance provider with any questions.    If during the course of the call the physician/provider feels a telephone visit is not appropriate, you will not be charged for this service.\"    Patient has given verbal consent for Telephone visit?  Yes    What phone number would you like to be contacted at? [phone]    How would you like to obtain your AVS? John       Chief Complaint:     Medication refills    HPI:   Patient Erich Craven is a very pleasant 80 year old male with history of adjustment disorder, hyperlipidemia, hypertension clinic today for telephone evaluation for medication refills. Regarding the patient's chronic adjustment disorder, the patient's anxiety symptoms are currently stable on the Klonopin medication. He is due for a refill of the Klonopin medication at this time. No chest pain, headaches, fever or chills. Regarding the patient's chronic hypertension, the patient is compliant with his BP medication regimen at this time. He is due for a refill of the Coreg BP medication at this time.            Current Medications:     Current Outpatient Medications   Medication Sig Dispense " Refill     acetaminophen (TYLENOL) 325 MG tablet Take 325-650 mg by mouth every 6 hours as needed for mild pain       Acetylcarnitine HCl (ACETYL L-CARNITINE) 500 MG CAPS Take 1 capsule by mouth 2 times daily       albuterol (PROAIR HFA/PROVENTIL HFA/VENTOLIN HFA) 108 (90 Base) MCG/ACT inhaler Inhale 2 puffs into the lungs every 4 hours as needed for shortness of breath / dyspnea or wheezing 1 Inhaler 0     albuterol (PROVENTIL) (2.5 MG/3ML) 0.083% neb solution INHALE 1 VIAL BY NEBULIZATION EVERY 4 HOURS AS NEEDED FOR SHORTNESS OF BREATH/DYSPNEA OR WHEEZING. 75 mL 3     aspirin (ASA) 81 MG chewable tablet Take 1 tablet by mouth daily       atorvastatin (LIPITOR) 40 MG tablet Take 1 tablet (40 mg) by mouth every evening 90 tablet 3     carvedilol (COREG) 12.5 MG tablet Take 1 tablet (12.5 mg) by mouth 2 times daily 180 tablet 3     clonazePAM (KLONOPIN) 0.5 MG tablet TAKE 1 TABLET BY MOUTH TWICE A DAY AS NEEDED 60 tablet 0     co-enzyme Q-10 100 MG CAPS capsule Take 100 mg by mouth daily       diltiazem ER COATED BEADS (CARDIZEM CD/CARTIA XT) 300 MG 24 hr capsule Take 1 capsule (300 mg) by mouth daily 90 capsule 0     furosemide (LASIX) 20 MG tablet TAKE 3 TABLETS BY MOUTH TWICE DAILY. 540 tablet 3     isosorbide mononitrate (IMDUR) 30 MG 24 hr tablet Take 1 tablet (30 mg) by mouth daily 90 tablet 3     levothyroxine (SYNTHROID/LEVOTHROID) 175 MCG tablet Take 1 tablet (175 mcg) by mouth daily Additional 1/2 tablet on Sundays 90 tablet 3     lisinopril (PRINIVIL/ZESTRIL) 40 MG tablet Take 1 tablet (40 mg) by mouth daily 90 tablet 3     metolazone (ZAROXOLYN) 2.5 MG tablet Take 1 tablet (2.5 mg) by mouth 2 times daily 180 tablet 3     Multiple vitamin TABS Take 1 tablet by mouth daily       order for DME Oxygen 2 Li/min  Continuous. At night bled into PAP device. In the daytime, NC with bubbler: requiring portability 1 Device 0     potassium chloride ER (K-TAB/KLOR-CON) 10 MEQ CR tablet Take 2 tablets (20 mEq) by mouth  2 times daily (Patient taking differently: Take 10 mEq by mouth 2 times daily ) 360 tablet 3     Psyllium (METAMUCIL PO) Take by mouth 2 times daily       tamsulosin (FLOMAX) 0.4 MG capsule Take 1 capsule (0.4 mg) by mouth daily 90 capsule 3     Taurine 500 MG CAPS Take 500 mg by mouth 2 times daily       venlafaxine (EFFEXOR) 37.5 MG tablet Take 1 tablet (37.5 mg) by mouth 2 times daily 180 tablet 3     warfarin ANTICOAGULANT (COUMADIN) 5 MG tablet TAKE 1.5 TABLETS ON WEDNESDAYS AND TAKE 1 TABLET ALL OTHER DAYS OR AS DIRECTED BY THE INR CLINIC 100 tablet 0     clonazePAM (KLONOPIN) 0.5 MG tablet TAKE 1 TABLET BY MOUTH TWICE A DAY AS NEEDED 60 tablet 0         Allergies:      Allergies   Allergen Reactions     No Known Allergies             Past Medical History:     Past Medical History:   Diagnosis Date     (HFpEF) heart failure with preserved ejection fraction (H)      BPH with urinary obstruction      CAD (coronary artery disease)     PCI w/ SUMMER in 2014     Cardiac pacemaker in situ      Chronic diastolic heart failure (H)      COPD (chronic obstructive pulmonary disease) (H)      DVT (deep venous thrombosis) (H)      Dyspnea      Essential hypertension      Fatigue      HLD (hyperlipidemia)      Hypothyroidism      Lymphedema      Obesity, Class III, BMI 40-49.9 (morbid obesity) (H)      RIN (obstructive sleep apnea)     has refused CPAP     Paroxysmal A-fib (H)      PE (pulmonary thromboembolism) (H)          Past Surgical History:     Past Surgical History:   Procedure Laterality Date     cardiac stenting       CV RIGHT HEART CATH N/A 11/5/2019    Procedure: Right Heart Cath;  Surgeon: Roberto Hernandez MD;  Location:  HEART CARDIAC CATH LAB     ENT SURGERY      tonsillectomy     IMPLANT PACEMAKER       ORTHOPEDIC SURGERY Right 2012    knee replacement         Family Medical History:     Family History   Problem Relation Age of Onset     Hypertension Father          Social History:     Social History      Socioeconomic History     Marital status:      Spouse name: Not on file     Number of children: Not on file     Years of education: Not on file     Highest education level: Not on file   Occupational History     Not on file   Social Needs     Financial resource strain: Not on file     Food insecurity     Worry: Not on file     Inability: Not on file     Transportation needs     Medical: Not on file     Non-medical: Not on file   Tobacco Use     Smoking status: Former Smoker     Packs/day: 0.50     Years: 25.00     Pack years: 12.50     Smokeless tobacco: Never Used     Tobacco comment: quit in 1989   Substance and Sexual Activity     Alcohol use: No     Frequency: Never     Comment: quit in 1989     Drug use: No     Sexual activity: Not Currently     Partners: Female   Lifestyle     Physical activity     Days per week: Not on file     Minutes per session: Not on file     Stress: Not on file   Relationships     Social connections     Talks on phone: Not on file     Gets together: Not on file     Attends Shinto service: Not on file     Active member of club or organization: Not on file     Attends meetings of clubs or organizations: Not on file     Relationship status: Not on file     Intimate partner violence     Fear of current or ex partner: Not on file     Emotionally abused: Not on file     Physically abused: Not on file     Forced sexual activity: Not on file   Other Topics Concern     Parent/sibling w/ CABG, MI or angioplasty before 65F 55M? Not Asked   Social History Narrative     Not on file           Review of System:     Constitutional: Negative for fever or chills  Skin: Negative for rashes  Ears/Nose/Throat: Negative for nasal congestion, sore throat  Respiratory: No shortness of breath, dyspnea on exertion, cough, or hemoptysis, positive for chronic COPD  Cardiovascular: Negative for chest pain  Gastrointestinal: Negative for nausea, vomiting  Genitourinary: Negative for dysuria,  hematuria  Musculoskeletal: Negative for myalgias  Neurologic: Negative for headaches  Psychiatric: positive for adjustment disorder  Hematologic/Lymphatic/Immunologic: Negative  Endocrine: Negative  Behavioral: Negative for tobacco use       Physical Exam:   There were no vitals taken for this visit.            Diagnostic Test Results:     Recent Labs   Lab Test 02/27/19 08/07/18   CHOL 127 142   HDL 43 41   LDL 54 75   TRIG 151* 135         ASSESSMENT/PLAN:       (Z76.0) Encounter for medication refill  (primary encounter diagnosis)  (F43.21) Adjustment disorder with depressed mood  Comment: stable. Patient is due for a refill of Klonopin  Plan: clonazePAM (KLONOPIN) 0.5 MG tablet      (I10) Essential hypertension  Comment: stable. Patient is due for a refill of his carvedilol medication at this time.  Plan: carvedilol (COREG) 12.5 MG tablet    (J44.9) Chronic obstructive pulmonary disease, unspecified COPD type (H)  Comment: stable. Patient is due for a refill of albuterol medication.  Plan: albuterol (PROVENTIL) (2.5 MG/3ML) 0.083% neb         solution, albuterol (PROAIR HFA/PROVENTIL         HFA/VENTOLIN HFA) 108 (90 Base) MCG/ACT inhaler        Follow Up Plan:     Patient is instructed to return to Internal Medicine clinic for follow-up visit in 1 month.      Phone call duration:  25 minutes    Edith Hawthorne MD

## 2020-07-13 ENCOUNTER — VIRTUAL VISIT (OUTPATIENT)
Dept: FAMILY MEDICINE | Facility: CLINIC | Age: 80
End: 2020-07-13
Payer: MEDICARE

## 2020-07-13 DIAGNOSIS — I26.99 PE (PULMONARY THROMBOEMBOLISM) (H): ICD-10-CM

## 2020-07-13 DIAGNOSIS — E03.9 HYPOTHYROIDISM, UNSPECIFIED TYPE: ICD-10-CM

## 2020-07-13 DIAGNOSIS — I10 ESSENTIAL HYPERTENSION: ICD-10-CM

## 2020-07-13 DIAGNOSIS — I50.33 ACUTE ON CHRONIC HEART FAILURE WITH PRESERVED EJECTION FRACTION (H): ICD-10-CM

## 2020-07-13 DIAGNOSIS — E66.01 MORBID OBESITY (H): Primary | ICD-10-CM

## 2020-07-13 DIAGNOSIS — J44.9 CHRONIC OBSTRUCTIVE PULMONARY DISEASE, UNSPECIFIED COPD TYPE (H): ICD-10-CM

## 2020-07-13 DIAGNOSIS — G47.33 OSA (OBSTRUCTIVE SLEEP APNEA): ICD-10-CM

## 2020-07-13 DIAGNOSIS — E66.01 OBESITY, CLASS III, BMI 40-49.9 (MORBID OBESITY) (H): ICD-10-CM

## 2020-07-13 DIAGNOSIS — I48.0 PAROXYSMAL A-FIB (H): ICD-10-CM

## 2020-07-13 PROCEDURE — 99443 ZZC PHYSICIAN TELEPHONE EVALUATION 21-30 MIN: CPT | Performed by: INTERNAL MEDICINE

## 2020-07-13 NOTE — PROGRESS NOTES
"Erich Craven is a 80 year old male who is being evaluated via a billable telephone visit.      The patient has been notified of following:     \"This telephone visit will be conducted via a call between you and your physician/provider. We have found that certain health care needs can be provided without the need for a physical exam.  This service lets us provide the care you need with a short phone conversation.  If a prescription is necessary we can send it directly to your pharmacy.  If lab work is needed we can place an order for that and you can then stop by our lab to have the test done at a later time.    Telephone visits are billed at different rates depending on your insurance coverage. During this emergency period, for some insurers they may be billed the same as an in-person visit.  Please reach out to your insurance provider with any questions.    If during the course of the call the physician/provider feels a telephone visit is not appropriate, you will not be charged for this service.\"    Patient has given verbal consent for Telephone visit?  Yes    What phone number would you like to be contacted at? [phone]    How would you like to obtain your AVS? Mail a copy    Subjective     Erich Craven is a 80 year old male who presents via phone visit today for the following health issues:    HPI  Patient reports a low level of activity which is probably multifactorial related to his COPD and morbid obesity.  He has chronic venous stasis and a previous history of right-sided heart failure.(  Of note last fall he had a right heart cath that showed his right-sided pressures to be normal.)  His ankle swelling is moderate.  He denies any skin breakdown    He denies any symptom change or suggestion of atrial fibrillation with a rapid ventricular response.  He has not had his blood pressure checked recently.           Current Outpatient Medications   Medication Sig Dispense Refill     acetaminophen (TYLENOL) 325 " MG tablet Take 325-650 mg by mouth every 6 hours as needed for mild pain       Acetylcarnitine HCl (ACETYL L-CARNITINE) 500 MG CAPS Take 1 capsule by mouth 2 times daily       albuterol (PROAIR HFA/PROVENTIL HFA/VENTOLIN HFA) 108 (90 Base) MCG/ACT inhaler Inhale 2 puffs into the lungs every 4 hours as needed for shortness of breath / dyspnea or wheezing 1 Inhaler 0     albuterol (PROVENTIL) (2.5 MG/3ML) 0.083% neb solution INHALE 1 VIAL BY NEBULIZATION EVERY 4 HOURS AS NEEDED FOR SHORTNESS OF BREATH/DYSPNEA OR WHEEZING. 75 mL 3     aspirin (ASA) 81 MG chewable tablet Take 1 tablet by mouth daily       atorvastatin (LIPITOR) 40 MG tablet Take 1 tablet (40 mg) by mouth every evening 90 tablet 3     carvedilol (COREG) 12.5 MG tablet Take 1 tablet (12.5 mg) by mouth 2 times daily 180 tablet 3     clonazePAM (KLONOPIN) 0.5 MG tablet TAKE 1 TABLET BY MOUTH TWICE A DAY AS NEEDED 60 tablet 0     co-enzyme Q-10 100 MG CAPS capsule Take 100 mg by mouth daily       diltiazem ER COATED BEADS (CARDIZEM CD/CARTIA XT) 300 MG 24 hr capsule Take 1 capsule (300 mg) by mouth daily 90 capsule 0     furosemide (LASIX) 20 MG tablet TAKE 3 TABLETS BY MOUTH TWICE DAILY. 540 tablet 3     isosorbide mononitrate (IMDUR) 30 MG 24 hr tablet Take 1 tablet (30 mg) by mouth daily 90 tablet 3     levothyroxine (SYNTHROID/LEVOTHROID) 175 MCG tablet Take 1 tablet (175 mcg) by mouth daily Additional 1/2 tablet on Sundays 90 tablet 3     lisinopril (PRINIVIL/ZESTRIL) 40 MG tablet Take 1 tablet (40 mg) by mouth daily 90 tablet 3     metolazone (ZAROXOLYN) 2.5 MG tablet Take 1 tablet (2.5 mg) by mouth 2 times daily 180 tablet 3     Multiple vitamin TABS Take 1 tablet by mouth daily       order for DME Oxygen 2 Li/min  Continuous. At night bled into PAP device. In the daytime, NC with bubbler: requiring portability 1 Device 0     Psyllium (METAMUCIL PO) Take by mouth 2 times daily       tamsulosin (FLOMAX) 0.4 MG capsule Take 1 capsule (0.4 mg) by mouth  daily 90 capsule 3     Taurine 500 MG CAPS Take 500 mg by mouth 2 times daily       venlafaxine (EFFEXOR) 37.5 MG tablet Take 1 tablet (37.5 mg) by mouth 2 times daily 180 tablet 3     clonazePAM (KLONOPIN) 0.5 MG tablet TAKE 1 TABLET BY MOUTH TWICE A DAY AS NEEDED 60 tablet 2     potassium chloride ER (K-TAB) 20 MEQ CR tablet Take 2 tablets (40 mEq) by mouth 2 times daily 360 tablet 3     spironolactone (ALDACTONE) 25 MG tablet 2 tabs po qd 60 tablet 4     warfarin ANTICOAGULANT (COUMADIN) 5 MG tablet TAKE 1.5 TABLETS ON WEDNESDAYS AND TAKE 1 TABLET ALL OTHER DAYS OR AS DIRECTED BY THE INR CLINIC 95 tablet 1     Allergies   Allergen Reactions     No Known Allergies        Reviewed and updated as needed this visit by Provider         Review of Systems   Constitutional, HEENT, cardiovascular, pulmonary, gi and gu systems are negative, except as otherwise noted.       Objective   Reported vitals:  There were no vitals taken for this visit.   healthy, alert and no distress  PSYCH: Alert and oriented times 3; coherent speech, normal   rate and volume, able to articulate logical thoughts, able   to abstract reason, no tangential thoughts, no hallucinations   or delusions  His affect is normal  RESP: No cough, no audible wheezing, able to talk in full sentences  Remainder of exam unable to be completed due to telephone visits            Assessment/Plan:  1. Morbid obesity (H)  No significant changes in weight    2. Acute on chronic heart failure with preserved ejection fraction (H)  Follow closely this is probably not associated with his chronic venous stasis    3. Obesity, Class III, BMI 40-49.9 (morbid obesity) (H)    4. Paroxysmal A-fib (H)  No symptomatic episodes, his pacemaker does show episodes of paroxysmal atrial fibrillation    5. Chronic obstructive pulmonary disease, unspecified COPD type (H)  Uses his CPAP for his RIN along with O2 at at bedtime    6. PE (pulmonary thromboembolism) (H)      7. Essential  hypertension  Well-controlled from previous reports    8. RIN (obstructive sleep apnea)  Continue CPAP    9. Hypothyroidism, unspecified type  Routine follow-up    Follow-up labs were ordered by cardiology  No follow-ups on file.      Phone call duration:22   minutes    Return to clinic in 1 month  Dontrell Gómez MD

## 2020-07-14 ENCOUNTER — ANTICOAGULATION THERAPY VISIT (OUTPATIENT)
Dept: NURSING | Facility: CLINIC | Age: 80
End: 2020-07-14

## 2020-07-14 DIAGNOSIS — I26.99 PE (PULMONARY THROMBOEMBOLISM) (H): ICD-10-CM

## 2020-07-14 DIAGNOSIS — I48.0 PAROXYSMAL A-FIB (H): ICD-10-CM

## 2020-07-14 DIAGNOSIS — I48.91 ATRIAL FIBRILLATION (H): ICD-10-CM

## 2020-07-14 DIAGNOSIS — I10 ESSENTIAL HYPERTENSION: ICD-10-CM

## 2020-07-14 DIAGNOSIS — I25.10 CORONARY ARTERY DISEASE INVOLVING NATIVE CORONARY ARTERY OF NATIVE HEART WITHOUT ANGINA PECTORIS: ICD-10-CM

## 2020-07-14 DIAGNOSIS — I50.30 HEART FAILURE WITH PRESERVED EJECTION FRACTION, UNSPECIFIED HF CHRONICITY (H): ICD-10-CM

## 2020-07-14 DIAGNOSIS — I82.4Z9 DEEP VEIN THROMBOSIS (DVT) OF DISTAL VEIN OF LOWER EXTREMITY, UNSPECIFIED CHRONICITY, UNSPECIFIED LATERALITY (H): ICD-10-CM

## 2020-07-14 DIAGNOSIS — Z53.9 ERRONEOUS ENCOUNTER--DISREGARD: Primary | ICD-10-CM

## 2020-07-14 LAB — INR PPP: 2.1 (ref 0.86–1.14)

## 2020-07-14 PROCEDURE — 80048 BASIC METABOLIC PNL TOTAL CA: CPT | Performed by: NURSE PRACTITIONER

## 2020-07-14 PROCEDURE — 85610 PROTHROMBIN TIME: CPT | Performed by: NURSE PRACTITIONER

## 2020-07-14 PROCEDURE — 84460 ALANINE AMINO (ALT) (SGPT): CPT | Performed by: NURSE PRACTITIONER

## 2020-07-14 PROCEDURE — 80061 LIPID PANEL: CPT | Performed by: NURSE PRACTITIONER

## 2020-07-14 PROCEDURE — 36415 COLL VENOUS BLD VENIPUNCTURE: CPT | Performed by: NURSE PRACTITIONER

## 2020-07-14 RX ORDER — WARFARIN SODIUM 5 MG/1
TABLET ORAL
Qty: 100 TABLET | Refills: 0 | Status: SHIPPED | OUTPATIENT
Start: 2020-07-14 | End: 2020-07-29

## 2020-07-14 NOTE — PROGRESS NOTES
ANTICOAGULATION MANAGEMENT     Patient Name:  Erich Craven  Date:  2020    ASSESSMENT /SUBJECTIVE:    Today's INR result of 2.1 is therapeutic. Goal INR of 2.0-3.0      Warfarin dose taken: Warfarin taken as previously instructed    Diet: No new diet changes affecting INR    Medication changes/ interactions: No new medications/supplements affecting INR    Previous INR: Therapeutic     S/S of bleeding or thromboembolism: No    New injury or illness: No    Upcoming surgery, procedure or cardioversion: No    Additional findings: None      PLAN:    Spoke with Erich regarding INR result and instructed:     Warfarin Dosing Instructions: Continue your current warfarin dose 7.5mg Wed, 5mg ROW    Instructed patient to follow up no later than: 8 weeks  TTR 80.7% & has been in range 6 months +/- 0.1 from goal INR 2-3,  Lab visit scheduled    Education provided: None required      Erich verbalizes understanding and agrees to warfarin dosing plan.    Instructed to call the Anticoagulation Clinic for any changes, questions or concerns. (#[phone])        Sherin Santillan [facility]      OBJECTIVE:  Recent labs: (last 7 days)     20  1532   INR 2.10*         No question data found.  Anticoagulation Summary  As of 2020    INR goal:   2.0-3.0   TTR:   80.7 % (1 y)   INR used for dosin.10 (2020)   Warfarin maintenance plan:   7.5 mg (5 mg x 1.5) every Wed; 5 mg (5 mg x 1) all other days   Full warfarin instructions:   7.5 mg every Wed; 5 mg all other days   Weekly warfarin total:   37.5 mg   Plan last modified:   Delia Jang RN (1/15/2020)   Next INR check:   2020   Priority:   Maintenance   Target end date:   Indefinite    Indications    PE (pulmonary thromboembolism) (H) [I26.99]  Paroxysmal A-fib (H) [I48.0]  Deep vein thrombosis (DVT) of distal vein of lower extremity  unspecified chronicity  unspecified laterality (H) [I82.4Z9]             Anticoagulation Episode Summary     INR check  location:       Preferred lab:       Send INR reminders to:   SERENITY BECERRA    Comments:         Anticoagulation Care Providers     Provider Role Specialty Phone number    Dontrell Gómez MD Referring Internal Medicine [phone]

## 2020-07-14 NOTE — PROGRESS NOTES
Anticoagulation Management    Unable to reach Jaun today.    Today's INR result of 2.1 is therapeutic (goal INR of 2.0-3.0).  Result received from: Clinic Lab    Follow up required to confirm warfarin dose taken and assess for changes    Left message to continue current dose of warfarin 5 mg tonight.      Anticoagulation clinic to follow up    Evita Lloyd RN

## 2020-07-15 LAB
ALT SERPL W P-5'-P-CCNC: 45 U/L (ref 0–70)
ANION GAP SERPL CALCULATED.3IONS-SCNC: 9 MMOL/L (ref 3–14)
BUN SERPL-MCNC: 27 MG/DL (ref 7–30)
CALCIUM SERPL-MCNC: 8.6 MG/DL (ref 8.5–10.1)
CHLORIDE SERPL-SCNC: 94 MMOL/L (ref 94–109)
CHOLEST SERPL-MCNC: 126 MG/DL
CO2 SERPL-SCNC: 33 MMOL/L (ref 20–32)
CREAT SERPL-MCNC: 0.96 MG/DL (ref 0.66–1.25)
GFR SERPL CREATININE-BSD FRML MDRD: 74 ML/MIN/{1.73_M2}
GLUCOSE SERPL-MCNC: 108 MG/DL (ref 70–99)
HDLC SERPL-MCNC: 42 MG/DL
LDLC SERPL CALC-MCNC: 36 MG/DL
NONHDLC SERPL-MCNC: 84 MG/DL
POTASSIUM SERPL-SCNC: 3 MMOL/L (ref 3.4–5.3)
SODIUM SERPL-SCNC: 136 MMOL/L (ref 133–144)
TRIGL SERPL-MCNC: 238 MG/DL

## 2020-07-17 ENCOUNTER — TELEPHONE (OUTPATIENT)
Dept: CARDIOLOGY | Facility: CLINIC | Age: 80
End: 2020-07-17

## 2020-07-17 DIAGNOSIS — E87.6 HYPOKALEMIA: Primary | ICD-10-CM

## 2020-07-17 DIAGNOSIS — I50.32 CHRONIC DIASTOLIC HEART FAILURE (H): ICD-10-CM

## 2020-07-17 RX ORDER — POTASSIUM CHLORIDE 750 MG/1
30 TABLET, EXTENDED RELEASE ORAL 2 TIMES DAILY
Qty: 360 TABLET | Refills: 3
Start: 2020-07-17 | End: 2020-07-24

## 2020-07-17 NOTE — TELEPHONE ENCOUNTER
Would confirm that patient is taking 20 meqv of KCL twice a day. At last visit he was erroneously taking 10 meqv twice a day and was told to double up. If he is correctly taking 20 meqv KCL twice a day then would increase to 30 meqv twice a day and check BMP in 1 week. andreas

## 2020-07-17 NOTE — TELEPHONE ENCOUNTER
Reviewed labs showing  !COMPREHENSIVE Latest Ref Rng & Units 5/21/2020 7/14/2020   SODIUM 133 - 144 mmol/L 139 136   POTASSIUM 3.4 - 5.3 mmol/L 3.2 (L) 3.0 (L)   CHLORIDE 94 - 109 mmol/L 93 (L) 94   BUN 7 - 30 mg/dL 29 27   Creatinine 0.66 - 1.25 mg/dL 1.23 0.96   Glucose 70 - 99 mg/dL 157 (H) 108 (H)   ANION GAP 3 - 14 mmol/L 10.2 9   CALCIUM 8.5 - 10.1 mg/dL 10.7 (H) 8.6   ALBUMIN 3.4 - 5.0 g/dL     PROTEIN, TOTAL 6.8 - 8.8 g/dL     AST 0 - 45 U/L     ALT 0 - 70 U/L  45   ALKPHOS 40 - 150 U/L     BILIRUBIN TOTAL 0.2 - 1.3 mg/dL       AMA Lipid Panel (RW) Latest Ref Rng & Units 2/27/2019 7/14/2020   CHOLESTEROL <200 mg/dL 127 126   TRIGLYCERIDES <150 mg/dL 151 (H) 238 (H)   HDL CHOLESTEROL >39 mg/dL 43 42   NON HDL CHOLESTEROL <130 mg/dL 84 84   LDL CHOLESTEROL, CALCULATED <100 mg/dL 54 36     Attempted to call pt with results, left message for pt to call back. Jessica PRETTY

## 2020-07-17 NOTE — TELEPHONE ENCOUNTER
Called pt, he is taking 20 mEq of potassium twice daily, advised to increase that to 30 mEq of potassium twice daily with BMP in one week. Pt scheduled for BMP 7/24/20. Order in chart. Jessica PRETTY

## 2020-07-21 NOTE — PROGRESS NOTES
20 1100   Signing Clinician's Name / Credentials   Signing clinician's name / credentials Sanjay Mike, OTR/L, CLT   Session Number   Session Number DISCHARGE NOTE   Comments   Comments Patient presented for evaluation 2019, CDT recommended.  Patient did not schedule recommended treatments, Medicare certification has .  Unable to determine final progress toward goals, LLIS score.  Discharge from edema treatment.

## 2020-07-23 ENCOUNTER — TELEPHONE (OUTPATIENT)
Dept: CARDIOLOGY | Facility: CLINIC | Age: 80
End: 2020-07-23

## 2020-07-23 NOTE — TELEPHONE ENCOUNTER

## 2020-07-24 DIAGNOSIS — E87.6 HYPOKALEMIA: ICD-10-CM

## 2020-07-24 LAB
ANION GAP SERPL CALCULATED.3IONS-SCNC: ABNORMAL MMOL/L (ref 6–17)
BUN SERPL-MCNC: 30 MG/DL (ref 7–30)
CALCIUM SERPL-MCNC: 9.2 MG/DL (ref 8.5–10.5)
CHLORIDE SERPL-SCNC: 95 MMOL/L (ref 98–107)
CO2 SERPL-SCNC: 33 MMOL/L (ref 23–29)
CREAT SERPL-MCNC: 1.17 MG/DL (ref 0.7–1.3)
GFR SERPL CREATININE-BSD FRML MDRD: 60 ML/MIN/{1.73_M2}
GLUCOSE SERPL-MCNC: 143 MG/DL (ref 70–105)
POTASSIUM SERPL-SCNC: 2.8 MMOL/L (ref 3.5–5.1)
SODIUM SERPL-SCNC: 136 MMOL/L (ref 136–145)

## 2020-07-24 PROCEDURE — 80048 BASIC METABOLIC PNL TOTAL CA: CPT | Performed by: INTERNAL MEDICINE

## 2020-07-24 PROCEDURE — 36415 COLL VENOUS BLD VENIPUNCTURE: CPT | Performed by: INTERNAL MEDICINE

## 2020-07-24 RX ORDER — POTASSIUM CHLORIDE 1500 MG/1
40 TABLET, EXTENDED RELEASE ORAL 2 TIMES DAILY
Qty: 360 TABLET | Refills: 3 | Status: SHIPPED | OUTPATIENT
Start: 2020-07-24 | End: 2020-08-21

## 2020-07-24 NOTE — TELEPHONE ENCOUNTER
Called pt with recommendations from Dr. Portillo to increase potassium to 40 mEq twice daily. Pt verified that he has been taking 30 mEq twice daily or 6 tabs daily, he will now increase to 8 tabs/day of the 10 mEq/day. New prescription escripted to [facility]. Pt advised to be sure to check the mEq on the new prescription as it is in 20 mEq., Pt verbalized understanding. Order placed for BMP & pt scheduled for 7/31/20. Pt is also eating a banana daily with breakfast & states he eats plenty of potatoes & tomatoes also. Jessica PRETTY

## 2020-07-24 NOTE — TELEPHONE ENCOUNTER
Reviewed BMP showing   Recent Labs   Lab Test 07/24/20  0954 07/14/20  1532    136   POTASSIUM 2.8* 3.0*   CHLORIDE 95* 94   CO2 33* 33*   ANIONGAP Not Calculated 9   * 108*   BUN 30 27   CR 1.17 0.96   LAWSON 9.2 8.6     Attempted to call to review his meds, left message for pt to call back. Jessica PRETTY

## 2020-07-24 NOTE — TELEPHONE ENCOUNTER
Needs to go 40 meqv twice a day. Very strange that K went down rather than going up after increasing from 20 twice a day to 30 twice a day. Needs BMP in 1 week. Thx

## 2020-07-27 DIAGNOSIS — I48.91 ATRIAL FIBRILLATION (H): ICD-10-CM

## 2020-07-28 ENCOUNTER — TELEPHONE (OUTPATIENT)
Dept: FAMILY MEDICINE | Facility: CLINIC | Age: 80
End: 2020-07-28

## 2020-07-28 ENCOUNTER — VIRTUAL VISIT (OUTPATIENT)
Dept: FAMILY MEDICINE | Facility: CLINIC | Age: 80
End: 2020-07-28
Payer: MEDICARE

## 2020-07-28 DIAGNOSIS — G47.33 OSA (OBSTRUCTIVE SLEEP APNEA): ICD-10-CM

## 2020-07-28 DIAGNOSIS — I50.30 HEART FAILURE WITH PRESERVED EJECTION FRACTION, UNSPECIFIED HF CHRONICITY (H): ICD-10-CM

## 2020-07-28 DIAGNOSIS — I48.11 LONGSTANDING PERSISTENT ATRIAL FIBRILLATION (H): Primary | ICD-10-CM

## 2020-07-28 DIAGNOSIS — E03.9 HYPOTHYROIDISM, UNSPECIFIED TYPE: ICD-10-CM

## 2020-07-28 DIAGNOSIS — I26.99 PE (PULMONARY THROMBOEMBOLISM) (H): ICD-10-CM

## 2020-07-28 DIAGNOSIS — I89.0 LYMPHEDEMA: ICD-10-CM

## 2020-07-28 DIAGNOSIS — J44.9 CHRONIC OBSTRUCTIVE PULMONARY DISEASE, UNSPECIFIED COPD TYPE (H): ICD-10-CM

## 2020-07-28 PROCEDURE — 99207 ZZC NO BILLABLE SERVICE THIS VISIT: CPT | Performed by: INTERNAL MEDICINE

## 2020-07-28 NOTE — TELEPHONE ENCOUNTER
99.4 fever  Fatigue ongoing   Not    Called pt to triage due to low potassium, fatigue and 99.4 fever today.  Pt low potassium has clear follow up plan from cardiology noted on 7/24, with a lab recheck on 7/31.    However fever is new, 99.4 and pt was in hospital visiting son approx 10 days ago, so will need covid testing, and did not qualify for oncare.      Denies any cp/hr concerns today besides fatigue.  Checking with Dr. Gómez on workin.  Martina Ackerman RN

## 2020-07-28 NOTE — PROGRESS NOTES
This is an elderly patient with alarming concern over hypokalemia especially given that he has had a recent critically low potassium of 2.8.  A virtual visit is inappropriate to manage this case and he will need to be evaluated/managed either at the clinic or at the emergency room so that his potassium level can be quickly rechecked and managed accordingly.  I would suggest that you also inform Dr. Gómez (PCP) as he knows this patient much better.    Call patient to follow-up on his hypokalemia which is associated with his diuretic therapy primarily being managed by the core clinic for his diastolic heart failure.  His potassium replacement has been rearranged with  Noted diuretic therapy.  He is having some associated muscle cramping.    I recommended adjusting his diuretics to only taking 3 tablets today and doubling his potassium supplement and returning to the clinic tomorrow for follow-up evaluation of his hypokalemia and diastolic heart failure.    18 minutes spent on this telephone encounter    .Dontrell Gómez

## 2020-07-29 ENCOUNTER — OFFICE VISIT (OUTPATIENT)
Dept: FAMILY MEDICINE | Facility: CLINIC | Age: 80
End: 2020-07-29
Payer: MEDICARE

## 2020-07-29 VITALS
WEIGHT: 299.38 LBS | HEART RATE: 87 BPM | BODY MASS INDEX: 40.55 KG/M2 | DIASTOLIC BLOOD PRESSURE: 76 MMHG | HEIGHT: 72 IN | SYSTOLIC BLOOD PRESSURE: 127 MMHG | TEMPERATURE: 97.3 F | OXYGEN SATURATION: 94 %

## 2020-07-29 DIAGNOSIS — I25.10 CORONARY ARTERY DISEASE INVOLVING NATIVE HEART WITHOUT ANGINA PECTORIS, UNSPECIFIED VESSEL OR LESION TYPE: ICD-10-CM

## 2020-07-29 DIAGNOSIS — I50.33 ACUTE ON CHRONIC HEART FAILURE WITH PRESERVED EJECTION FRACTION (H): ICD-10-CM

## 2020-07-29 DIAGNOSIS — I82.4Z9 DEEP VEIN THROMBOSIS (DVT) OF DISTAL VEIN OF LOWER EXTREMITY, UNSPECIFIED CHRONICITY, UNSPECIFIED LATERALITY (H): ICD-10-CM

## 2020-07-29 DIAGNOSIS — I89.0 LYMPHEDEMA: ICD-10-CM

## 2020-07-29 DIAGNOSIS — I48.11 LONGSTANDING PERSISTENT ATRIAL FIBRILLATION (H): Primary | ICD-10-CM

## 2020-07-29 DIAGNOSIS — I26.99 PE (PULMONARY THROMBOEMBOLISM) (H): ICD-10-CM

## 2020-07-29 DIAGNOSIS — J44.9 CHRONIC OBSTRUCTIVE PULMONARY DISEASE, UNSPECIFIED COPD TYPE (H): ICD-10-CM

## 2020-07-29 PROCEDURE — 99214 OFFICE O/P EST MOD 30 MIN: CPT | Performed by: INTERNAL MEDICINE

## 2020-07-29 PROCEDURE — 36415 COLL VENOUS BLD VENIPUNCTURE: CPT | Performed by: INTERNAL MEDICINE

## 2020-07-29 PROCEDURE — 80048 BASIC METABOLIC PNL TOTAL CA: CPT | Performed by: INTERNAL MEDICINE

## 2020-07-29 RX ORDER — WARFARIN SODIUM 5 MG/1
TABLET ORAL
Qty: 95 TABLET | Refills: 1 | Status: SHIPPED | OUTPATIENT
Start: 2020-07-29 | End: 2020-10-16

## 2020-07-29 RX ORDER — SPIRONOLACTONE 25 MG/1
TABLET ORAL
Qty: 60 TABLET | Refills: 4 | Status: SHIPPED | OUTPATIENT
Start: 2020-07-29 | End: 2020-12-21

## 2020-07-29 ASSESSMENT — MIFFLIN-ST. JEOR: SCORE: 2100.96

## 2020-07-29 NOTE — LETTER
August 4, 2020      Erich Craven  [address]  [place] [address]        Dear ,    We are writing to inform you of your test results.    Enclosed your lab reports from your recent office exam.  The basic metabolic panel showed that your potassium was improved but still slightly lower than normal.  As we started you on the spironolactone it should be starting to help to correct this as well as improving the activity of your other diuretic.  You should plan on coming to see me next week to reevaluate this on the Spironolactone.  If any questions regarding these reports please let me know.                                                           Resulted Orders   Basic metabolic panel   Result Value Ref Range    Sodium 137 133 - 144 mmol/L    Potassium 3.2 (L) 3.4 - 5.3 mmol/L    Chloride 99 94 - 109 mmol/L    Carbon Dioxide 32 20 - 32 mmol/L    Anion Gap 6 3 - 14 mmol/L    Glucose 138 (H) 70 - 99 mg/dL      Comment:      Non Fasting    Urea Nitrogen 23 7 - 30 mg/dL    Creatinine 1.14 0.66 - 1.25 mg/dL    GFR Estimate 60 (L) >60 mL/min/[1.73_m2]      Comment:      Non  GFR Calc  Starting 12/18/2018, serum creatinine based estimated GFR (eGFR) will be   calculated using the Chronic Kidney Disease Epidemiology Collaboration   (CKD-EPI) equation.      GFR Estimate If Black 69 >60 mL/min/[1.73_m2]      Comment:       GFR Calc  Starting 12/18/2018, serum creatinine based estimated GFR (eGFR) will be   calculated using the Chronic Kidney Disease Epidemiology Collaboration   (CKD-EPI) equation.      Calcium 9.1 8.5 - 10.1 mg/dL       If you have any questions or concerns, please call the clinic at the number listed above.       Sincerely,        Dontrell Gómez MD/ jamaica snider

## 2020-07-29 NOTE — PROGRESS NOTES
Subjective     Erich Craven is a 80 year old male who presents to clinic today for the following health issues:    HPI   Chief Complaint   Patient presents with     RECHECK     F/U on Low Potassium and fatigue     At the direction of the core clinics the patient was taking 60 mg of Lasix twice daily and having symptomatic hypokalemia.  He had called with complaints of increasing leg cramping 2 days ago and I recommended increasing his potassium supplement and reducing his Lasix to 20 mg in the a.m. and 40 mg in the p.m. until he was seen today as well as increasing his potassium supplement to 60 mEq twice daily until seen today.  He notes that the leg cramping has subsided and he has not noticed any symptoms suggestive of progression of his chronic diastolic heart failure.  He states that his weight has been ranging between 295-303 without change.            Current Outpatient Medications   Medication Sig Dispense Refill     acetaminophen (TYLENOL) 325 MG tablet Take 325-650 mg by mouth every 6 hours as needed for mild pain       Acetylcarnitine HCl (ACETYL L-CARNITINE) 500 MG CAPS Take 1 capsule by mouth 2 times daily       albuterol (PROAIR HFA/PROVENTIL HFA/VENTOLIN HFA) 108 (90 Base) MCG/ACT inhaler Inhale 2 puffs into the lungs every 4 hours as needed for shortness of breath / dyspnea or wheezing 1 Inhaler 0     albuterol (PROVENTIL) (2.5 MG/3ML) 0.083% neb solution INHALE 1 VIAL BY NEBULIZATION EVERY 4 HOURS AS NEEDED FOR SHORTNESS OF BREATH/DYSPNEA OR WHEEZING. 75 mL 3     aspirin (ASA) 81 MG chewable tablet Take 1 tablet by mouth daily       atorvastatin (LIPITOR) 40 MG tablet Take 1 tablet (40 mg) by mouth every evening 90 tablet 3     carvedilol (COREG) 12.5 MG tablet Take 1 tablet (12.5 mg) by mouth 2 times daily 180 tablet 3     clonazePAM (KLONOPIN) 0.5 MG tablet TAKE 1 TABLET BY MOUTH TWICE A DAY AS NEEDED 60 tablet 0     co-enzyme Q-10 100 MG CAPS capsule Take 100 mg by mouth daily       diltiazem ER  COATED BEADS (CARDIZEM CD/CARTIA XT) 300 MG 24 hr capsule Take 1 capsule (300 mg) by mouth daily 90 capsule 0     furosemide (LASIX) 20 MG tablet TAKE 3 TABLETS BY MOUTH TWICE DAILY. 540 tablet 3     isosorbide mononitrate (IMDUR) 30 MG 24 hr tablet Take 1 tablet (30 mg) by mouth daily 90 tablet 3     levothyroxine (SYNTHROID/LEVOTHROID) 175 MCG tablet Take 1 tablet (175 mcg) by mouth daily Additional 1/2 tablet on Sundays 90 tablet 3     lisinopril (PRINIVIL/ZESTRIL) 40 MG tablet Take 1 tablet (40 mg) by mouth daily 90 tablet 3     metolazone (ZAROXOLYN) 2.5 MG tablet Take 1 tablet (2.5 mg) by mouth 2 times daily 180 tablet 3     Multiple vitamin TABS Take 1 tablet by mouth daily       order for DME Oxygen 2 Li/min  Continuous. At night bled into PAP device. In the daytime, NC with bubbler: requiring portability 1 Device 0     potassium chloride ER (K-TAB) 20 MEQ CR tablet Take 2 tablets (40 mEq) by mouth 2 times daily 360 tablet 3     Psyllium (METAMUCIL PO) Take by mouth 2 times daily       spironolactone (ALDACTONE) 25 MG tablet 2 tabs po qd 60 tablet 4     tamsulosin (FLOMAX) 0.4 MG capsule Take 1 capsule (0.4 mg) by mouth daily 90 capsule 3     Taurine 500 MG CAPS Take 500 mg by mouth 2 times daily       venlafaxine (EFFEXOR) 37.5 MG tablet Take 1 tablet (37.5 mg) by mouth 2 times daily 180 tablet 3     warfarin ANTICOAGULANT (COUMADIN) 5 MG tablet TAKE 1.5 TABLETS ON WEDNESDAYS AND TAKE 1 TABLET ALL OTHER DAYS OR AS DIRECTED BY THE INR CLINIC 95 tablet 1     clonazePAM (KLONOPIN) 0.5 MG tablet TAKE 1 TABLET BY MOUTH TWICE A DAY AS NEEDED 60 tablet 2     Allergies   Allergen Reactions     No Known Allergies        Reviewed and updated as needed this visit by Provider         Review of Systems   Constitutional, HEENT, cardiovascular, pulmonary occasional wheezing treated with albuterol, CPAP at at bedtime, gi and gu nocturia x3 systems are negative, except as otherwise noted.    .vs  /76   Pulse 87    Temp 97.3  F (36.3  C) (Skin)   Ht 1.829 m (6')   Wt 135.8 kg (299 lb 6 oz)   SpO2 94%   BMI 40.60 kg/m      Objective    Temp 97.3  F (36.3  C) (Skin)   There is no height or weight on file to calculate BMI.  Physical Exam   GENERAL: Morbidly obese pleasant, alert and no distress  NECK: no adenopathy, no asymmetry, masses, or scars and thyroid normal to palpation  RESP: lungs clear to auscultation with decreased breath sounds in both bases- no rales, rhonchi or wheezes  CV: regular rate and rhythm, 2 to 3 months pretibial peripheral edema and peripheral pulses strong  ABDOMEN: soft, nontender, no hepatosplenomegaly, no masses and bowel sounds normal  MS: no gross musculoskeletal defects noted, no edema  Skin clean and dry          Assessment & Plan     1. Chronic diastolic heart failure (H)  Reduce potassium to twice daily, resume Lasix and follow-up reports  - Basic metabolic panel  - spironolactone (ALDACTONE) 25 MG tablet; 2 tabs po qd  Dispense: 60 tablet; Refill: 4   1. Longstanding persistent atrial fibrillation (H)  Well-controlled with current therapy    2. Acute on chronic heart failure with preserved ejection fraction (H)  See above    3. PE (pulmonary thromboembolism) (H)  Continue same anticoagulation    4. Chronic obstructive pulmonary disease, unspecified COPD type (H)  Continue same treatment program    5. Deep vein thrombosis (DVT) of distal vein of lower extremity, unspecified chronicity, unspecified laterality (H)      6. Coronary artery disease involving native heart without angina pectoris, unspecified vessel or lesion type  No current symptoms on current management    7. Lymphedema  Continue to aggressively diurese his volume overloaded state without causing hypo-or hyperkalemia    FUTURE APPOINTMENTS:       - Follow-up visit in 2 wks    No follow-ups on file.    Dontrell Gómez MD  [facility]

## 2020-07-30 ENCOUNTER — TELEPHONE (OUTPATIENT)
Dept: LAB | Facility: CLINIC | Age: 80
End: 2020-07-30

## 2020-07-30 LAB
ANION GAP SERPL CALCULATED.3IONS-SCNC: 6 MMOL/L (ref 3–14)
BUN SERPL-MCNC: 23 MG/DL (ref 7–30)
CALCIUM SERPL-MCNC: 9.1 MG/DL (ref 8.5–10.1)
CHLORIDE SERPL-SCNC: 99 MMOL/L (ref 94–109)
CO2 SERPL-SCNC: 32 MMOL/L (ref 20–32)
CREAT SERPL-MCNC: 1.14 MG/DL (ref 0.66–1.25)
GFR SERPL CREATININE-BSD FRML MDRD: 60 ML/MIN/{1.73_M2}
GLUCOSE SERPL-MCNC: 138 MG/DL (ref 70–99)
POTASSIUM SERPL-SCNC: 3.2 MMOL/L (ref 3.4–5.3)
SODIUM SERPL-SCNC: 137 MMOL/L (ref 133–144)

## 2020-07-31 ENCOUNTER — VIRTUAL VISIT (OUTPATIENT)
Dept: FAMILY MEDICINE | Facility: CLINIC | Age: 80
End: 2020-07-31
Payer: MEDICARE

## 2020-07-31 DIAGNOSIS — I10 ESSENTIAL HYPERTENSION: ICD-10-CM

## 2020-07-31 DIAGNOSIS — F43.22 ADJUSTMENT DISORDER WITH ANXIOUS MOOD: ICD-10-CM

## 2020-07-31 DIAGNOSIS — Z76.0 ENCOUNTER FOR MEDICATION REFILL: Primary | ICD-10-CM

## 2020-07-31 DIAGNOSIS — J44.9 CHRONIC OBSTRUCTIVE PULMONARY DISEASE, UNSPECIFIED COPD TYPE (H): ICD-10-CM

## 2020-07-31 PROCEDURE — 99443 ZZC PHYSICIAN TELEPHONE EVALUATION 21-30 MIN: CPT | Performed by: INTERNAL MEDICINE

## 2020-08-03 DIAGNOSIS — E87.6 HYPOKALEMIA: ICD-10-CM

## 2020-08-03 DIAGNOSIS — I50.32 CHRONIC DIASTOLIC HEART FAILURE (H): ICD-10-CM

## 2020-08-03 PROCEDURE — 36415 COLL VENOUS BLD VENIPUNCTURE: CPT | Performed by: INTERNAL MEDICINE

## 2020-08-03 PROCEDURE — 80048 BASIC METABOLIC PNL TOTAL CA: CPT | Performed by: INTERNAL MEDICINE

## 2020-08-04 ENCOUNTER — TELEPHONE (OUTPATIENT)
Dept: FAMILY MEDICINE | Facility: CLINIC | Age: 80
End: 2020-08-04

## 2020-08-04 DIAGNOSIS — E87.6 HYPOKALEMIA: Primary | ICD-10-CM

## 2020-08-04 DIAGNOSIS — I50.32 CHRONIC DIASTOLIC HEART FAILURE (H): ICD-10-CM

## 2020-08-04 DIAGNOSIS — R25.2 MUSCLE CRAMPS: ICD-10-CM

## 2020-08-04 LAB
ANION GAP SERPL CALCULATED.3IONS-SCNC: 5 MMOL/L (ref 3–14)
BUN SERPL-MCNC: 22 MG/DL (ref 7–30)
CALCIUM SERPL-MCNC: 9.7 MG/DL (ref 8.5–10.1)
CHLORIDE SERPL-SCNC: 100 MMOL/L (ref 94–109)
CO2 SERPL-SCNC: 30 MMOL/L (ref 20–32)
CREAT SERPL-MCNC: 1.15 MG/DL (ref 0.66–1.25)
GFR SERPL CREATININE-BSD FRML MDRD: 59 ML/MIN/{1.73_M2}
GLUCOSE SERPL-MCNC: 138 MG/DL (ref 70–99)
POTASSIUM SERPL-SCNC: 4.9 MMOL/L (ref 3.4–5.3)
SODIUM SERPL-SCNC: 135 MMOL/L (ref 133–144)

## 2020-08-04 NOTE — TELEPHONE ENCOUNTER
Symptoms  Describe your symptoms: Still having cramps in his fingers and legs after several blood test on his potassium  Any pain: Yes: cramping  How long have you been having symptoms: ongoing   Have you been seen for this:  Yes: is concerned still unbalanced  Appointment offered?: No  Triage offered?: No  Home remedies tried: N/A  Requested Pharmacy: N/A  Okay to leave a detailed message? No at Cell number on file:    Telephone Information:   Mobile [phone]

## 2020-08-04 NOTE — TELEPHONE ENCOUNTER
PCP,    Pt is still concerned about potassium levels. He is taking 40 mEq BID. Latest K level was 4.9, yesterday.   He is still having cramps in fingers and legs.    Please advise plan    Thank you,  Jonathan SAMANO RN

## 2020-08-05 ENCOUNTER — TELEPHONE (OUTPATIENT)
Dept: FAMILY MEDICINE | Facility: CLINIC | Age: 80
End: 2020-08-05

## 2020-08-05 NOTE — TELEPHONE ENCOUNTER
Please  Add ck  muscle enzyme and inflammatory marker CRP dx muscle cramps. I will sign.    Can not recommend further test unless patient is seen, she can schedule follow up with PCP sooner than 8/12,

## 2020-08-05 NOTE — TELEPHONE ENCOUNTER
Pt wants to know if Dr Gómez wants to check any other labs at his appt on Monday. He wants to figure out why he is getting cramps

## 2020-08-05 NOTE — TELEPHONE ENCOUNTER
Dr Heath---requested orders pended.     Please review and sign.     OK to close the encounter after signing orders.     Patient is already scheduled for lab appointment and aware of the additional lab orders.     Lanny GARNICA RN,BSN

## 2020-08-05 NOTE — TELEPHONE ENCOUNTER
TO PROVIDER:     Noted BMP and Mg lab already ordered - pt asking if any additional labs should be checked to determine cause of muscle cramps?     Or, should patient wait until results back from Mg/BMP recheck?     Per last visit with Dr. Gómez pt to follow up in 2 weeks (around 8/12/2020) - should we advise sooner follow up for cramps?     Keisha MARTIN RN

## 2020-08-05 NOTE — TELEPHONE ENCOUNTER
Labs pended     Once signed will call to advise of this/sooner visit with PCP     Thank you  Keisha MARTIN RN

## 2020-08-08 NOTE — TELEPHONE ENCOUNTER
Reviewed BMP showing   Recent Labs   Lab Test 08/03/20  1314 07/29/20  1608    137   POTASSIUM 4.9 3.2*   CHLORIDE 100 99   CO2 30 32   ANIONGAP 5 6   * 138*   BUN 22 23   CR 1.15 1.14   LAWSON 9.7 9.1     Will call pt to review. Jessica PRETTY

## 2020-08-11 ENCOUNTER — OFFICE VISIT (OUTPATIENT)
Dept: FAMILY MEDICINE | Facility: CLINIC | Age: 80
End: 2020-08-11
Payer: MEDICARE

## 2020-08-11 VITALS
BODY MASS INDEX: 39.89 KG/M2 | OXYGEN SATURATION: 96 % | HEART RATE: 74 BPM | HEIGHT: 72 IN | SYSTOLIC BLOOD PRESSURE: 102 MMHG | TEMPERATURE: 97.1 F | DIASTOLIC BLOOD PRESSURE: 70 MMHG | WEIGHT: 294.5 LBS

## 2020-08-11 DIAGNOSIS — Z00.00 ROUTINE GENERAL MEDICAL EXAMINATION AT A HEALTH CARE FACILITY: ICD-10-CM

## 2020-08-11 DIAGNOSIS — M19.011 PRIMARY OSTEOARTHRITIS OF RIGHT SHOULDER: ICD-10-CM

## 2020-08-11 DIAGNOSIS — I25.10 CORONARY ARTERY DISEASE INVOLVING NATIVE HEART WITHOUT ANGINA PECTORIS, UNSPECIFIED VESSEL OR LESION TYPE: ICD-10-CM

## 2020-08-11 DIAGNOSIS — R73.9 HYPERGLYCEMIA: ICD-10-CM

## 2020-08-11 DIAGNOSIS — E78.5 HYPERLIPIDEMIA LDL GOAL <100: ICD-10-CM

## 2020-08-11 DIAGNOSIS — R25.2 MUSCLE CRAMPS: ICD-10-CM

## 2020-08-11 DIAGNOSIS — I10 ESSENTIAL HYPERTENSION: ICD-10-CM

## 2020-08-11 DIAGNOSIS — I50.32 CHRONIC DIASTOLIC HEART FAILURE (H): ICD-10-CM

## 2020-08-11 DIAGNOSIS — I82.4Z9 DEEP VEIN THROMBOSIS (DVT) OF DISTAL VEIN OF LOWER EXTREMITY, UNSPECIFIED CHRONICITY, UNSPECIFIED LATERALITY (H): ICD-10-CM

## 2020-08-11 DIAGNOSIS — I89.0 LYMPHEDEMA: ICD-10-CM

## 2020-08-11 DIAGNOSIS — Z13.29 SCREENING FOR HYPOTHYROIDISM: ICD-10-CM

## 2020-08-11 DIAGNOSIS — R30.0 DYSURIA: ICD-10-CM

## 2020-08-11 DIAGNOSIS — G47.33 OSA (OBSTRUCTIVE SLEEP APNEA): ICD-10-CM

## 2020-08-11 DIAGNOSIS — E66.01 MORBID OBESITY (H): Primary | ICD-10-CM

## 2020-08-11 DIAGNOSIS — J44.9 CHRONIC OBSTRUCTIVE PULMONARY DISEASE, UNSPECIFIED COPD TYPE (H): ICD-10-CM

## 2020-08-11 DIAGNOSIS — I48.0 PAROXYSMAL A-FIB (H): ICD-10-CM

## 2020-08-11 LAB
ALBUMIN UR-MCNC: NEGATIVE MG/DL
APPEARANCE UR: CLEAR
BACTERIA #/AREA URNS HPF: ABNORMAL /HPF
BILIRUB UR QL STRIP: NEGATIVE
COLOR UR AUTO: YELLOW
CRP SERPL-MCNC: 9.8 MG/L (ref 0–8)
ERYTHROCYTE [DISTWIDTH] IN BLOOD BY AUTOMATED COUNT: 16.9 % (ref 10–15)
FEF 25/75: NORMAL
FEV-1: NORMAL
FEV1/FVC: NORMAL
FVC: NORMAL
GLUCOSE UR STRIP-MCNC: NEGATIVE MG/DL
HBA1C MFR BLD: 6.7 % (ref 0–5.6)
HCT VFR BLD AUTO: 42.5 % (ref 40–53)
HGB BLD-MCNC: 13.6 G/DL (ref 13.3–17.7)
HGB UR QL STRIP: NEGATIVE
KETONES UR STRIP-MCNC: NEGATIVE MG/DL
LEUKOCYTE ESTERASE UR QL STRIP: ABNORMAL
MCH RBC QN AUTO: 32.1 PG (ref 26.5–33)
MCHC RBC AUTO-ENTMCNC: 32 G/DL (ref 31.5–36.5)
MCV RBC AUTO: 100 FL (ref 78–100)
NITRATE UR QL: NEGATIVE
NON-SQ EPI CELLS #/AREA URNS LPF: ABNORMAL /LPF
NT-PROBNP SERPL-MCNC: 27 PG/ML (ref 0–450)
PH UR STRIP: 6.5 PH (ref 5–7)
PLATELET # BLD AUTO: 247 10E9/L (ref 150–450)
RBC # BLD AUTO: 4.24 10E12/L (ref 4.4–5.9)
RBC #/AREA URNS AUTO: ABNORMAL /HPF
SOURCE: ABNORMAL
SP GR UR STRIP: 1.02 (ref 1–1.03)
UROBILINOGEN UR STRIP-ACNC: 0.2 EU/DL (ref 0.2–1)
WBC # BLD AUTO: 10.2 10E9/L (ref 4–11)
WBC #/AREA URNS AUTO: ABNORMAL /HPF

## 2020-08-11 PROCEDURE — 80061 LIPID PANEL: CPT | Performed by: INTERNAL MEDICINE

## 2020-08-11 PROCEDURE — 80053 COMPREHEN METABOLIC PANEL: CPT | Performed by: INTERNAL MEDICINE

## 2020-08-11 PROCEDURE — 36415 COLL VENOUS BLD VENIPUNCTURE: CPT | Performed by: INTERNAL MEDICINE

## 2020-08-11 PROCEDURE — 86140 C-REACTIVE PROTEIN: CPT | Performed by: INTERNAL MEDICINE

## 2020-08-11 PROCEDURE — 94010 BREATHING CAPACITY TEST: CPT | Performed by: INTERNAL MEDICINE

## 2020-08-11 PROCEDURE — 81001 URINALYSIS AUTO W/SCOPE: CPT | Performed by: INTERNAL MEDICINE

## 2020-08-11 PROCEDURE — 84443 ASSAY THYROID STIM HORMONE: CPT | Performed by: INTERNAL MEDICINE

## 2020-08-11 PROCEDURE — 85027 COMPLETE CBC AUTOMATED: CPT | Performed by: INTERNAL MEDICINE

## 2020-08-11 PROCEDURE — 87086 URINE CULTURE/COLONY COUNT: CPT | Performed by: INTERNAL MEDICINE

## 2020-08-11 PROCEDURE — G0439 PPPS, SUBSEQ VISIT: HCPCS | Performed by: INTERNAL MEDICINE

## 2020-08-11 PROCEDURE — 83880 ASSAY OF NATRIURETIC PEPTIDE: CPT | Performed by: INTERNAL MEDICINE

## 2020-08-11 PROCEDURE — 20610 DRAIN/INJ JOINT/BURSA W/O US: CPT | Performed by: INTERNAL MEDICINE

## 2020-08-11 PROCEDURE — 82550 ASSAY OF CK (CPK): CPT | Performed by: INTERNAL MEDICINE

## 2020-08-11 PROCEDURE — 83036 HEMOGLOBIN GLYCOSYLATED A1C: CPT | Performed by: INTERNAL MEDICINE

## 2020-08-11 PROCEDURE — 99214 OFFICE O/P EST MOD 30 MIN: CPT | Mod: 25 | Performed by: INTERNAL MEDICINE

## 2020-08-11 PROCEDURE — 83735 ASSAY OF MAGNESIUM: CPT | Performed by: INTERNAL MEDICINE

## 2020-08-11 RX ORDER — FUROSEMIDE 20 MG
TABLET ORAL
Qty: 540 TABLET | Refills: 3 | Status: CANCELLED | OUTPATIENT
Start: 2020-08-11

## 2020-08-11 ASSESSMENT — MIFFLIN-ST. JEOR: SCORE: 2078.84

## 2020-08-11 ASSESSMENT — ACTIVITIES OF DAILY LIVING (ADL): CURRENT_FUNCTION: NO ASSISTANCE NEEDED

## 2020-08-11 NOTE — LETTER
August 12, 2020      Erich Craven  [address]  [place] [facility] [address]        To whom it may concern:    Erich Craven is a vulnerable adult followed for morbid obesity, chronic diastolic heart failure, COPD, paroxysmal atrial fibrillation and adult onset diabetes.  Because of Mr. Ramos's ongoing instability and need to maintain a regular active lifestyle I am recommending that a service dog would be a great help to him.    If you have any other questions regarding his needs or any further information regarding his chronic health concerns please contact me.    Sincerely,        Dontrell Gómez MD

## 2020-08-12 PROBLEM — E11.9 DIABETES MELLITUS, TYPE 2 (H): Status: ACTIVE | Noted: 2020-08-12

## 2020-08-12 LAB
ALBUMIN SERPL-MCNC: 3.7 G/DL (ref 3.4–5)
ALP SERPL-CCNC: 104 U/L (ref 40–150)
ALT SERPL W P-5'-P-CCNC: 49 U/L (ref 0–70)
ANION GAP SERPL CALCULATED.3IONS-SCNC: 6 MMOL/L (ref 3–14)
AST SERPL W P-5'-P-CCNC: 31 U/L (ref 0–45)
BACTERIA SPEC CULT: NORMAL
BILIRUB SERPL-MCNC: 0.5 MG/DL (ref 0.2–1.3)
BUN SERPL-MCNC: 32 MG/DL (ref 7–30)
CALCIUM SERPL-MCNC: 8.8 MG/DL (ref 8.5–10.1)
CHLORIDE SERPL-SCNC: 103 MMOL/L (ref 94–109)
CHOLEST SERPL-MCNC: 131 MG/DL
CK SERPL-CCNC: 36 U/L (ref 30–300)
CO2 SERPL-SCNC: 25 MMOL/L (ref 20–32)
CREAT SERPL-MCNC: 1.36 MG/DL (ref 0.66–1.25)
GFR SERPL CREATININE-BSD FRML MDRD: 48 ML/MIN/{1.73_M2}
GLUCOSE SERPL-MCNC: 92 MG/DL (ref 70–99)
HDLC SERPL-MCNC: 44 MG/DL
LDLC SERPL CALC-MCNC: 56 MG/DL
MAGNESIUM SERPL-MCNC: 2.7 MG/DL (ref 1.6–2.3)
NONHDLC SERPL-MCNC: 87 MG/DL
POTASSIUM SERPL-SCNC: 6 MMOL/L (ref 3.4–5.3)
PROT SERPL-MCNC: 8.2 G/DL (ref 6.8–8.8)
SODIUM SERPL-SCNC: 134 MMOL/L (ref 133–144)
SPECIMEN SOURCE: NORMAL
TRIGL SERPL-MCNC: 154 MG/DL
TSH SERPL DL<=0.005 MIU/L-ACNC: 0.79 MU/L (ref 0.4–4)

## 2020-08-12 NOTE — TELEPHONE ENCOUNTER
Reviewed BMP showing   Recent Labs   Lab Test 08/11/20  1343 08/03/20  1314    135   POTASSIUM 6.0* 4.9   CHLORIDE 103 100   CO2 25 30   ANIONGAP 6 5   GLC 92 138*   BUN 32* 22   CR 1.36* 1.15   LAWSON 8.8 9.7     Pt advised that his potassium is elevated, advised to hold potassium for now & call Dr. Gómez's office for further recommendations. Jessica PRETTY

## 2020-08-13 ENCOUNTER — TELEPHONE (OUTPATIENT)
Dept: FAMILY MEDICINE | Facility: CLINIC | Age: 80
End: 2020-08-13

## 2020-08-13 NOTE — TELEPHONE ENCOUNTER
Reason for Call:  Request for results:    Name of test or procedure: magnesium, CK total, CRP inflammation, urine, N terminal pro, hemoglobin, TSH, Lipid, CMP, CBC    Date of test of procedure: 8/11/20    Location of the test or procedure: [facility]    OK to leave the result message on voice mail or with a family member? NO    Phone number Patient can be reached at:  Cell number on file:    Telephone Information:   Mobile [phone]       Additional comments: Please review and call back with results.    Call taken on 8/13/2020 at 2:04 PM by Oscar Stafford

## 2020-08-13 NOTE — RESULT ENCOUNTER NOTE
Muscle enzyme called CK is normal  Inflammatory marker CRP slightly elevated 9.8.  Will forward to PCP

## 2020-08-14 NOTE — TELEPHONE ENCOUNTER
Reviewed labs, patient has been off potassium since his office visit will return to clinic in 1 week for follow-up lab testing.

## 2020-08-14 NOTE — TELEPHONE ENCOUNTER
Pt states he spoke with Dr. Gómez this morning who advised he hold the potassium & recheck on Monday. Jessica PRETTY

## 2020-08-19 ENCOUNTER — OFFICE VISIT (OUTPATIENT)
Dept: FAMILY MEDICINE | Facility: CLINIC | Age: 80
End: 2020-08-19
Payer: MEDICARE

## 2020-08-19 VITALS
OXYGEN SATURATION: 95 % | SYSTOLIC BLOOD PRESSURE: 115 MMHG | BODY MASS INDEX: 39.58 KG/M2 | HEIGHT: 72 IN | WEIGHT: 292.25 LBS | HEART RATE: 78 BPM | TEMPERATURE: 96.9 F | DIASTOLIC BLOOD PRESSURE: 70 MMHG

## 2020-08-19 DIAGNOSIS — I89.0 LYMPHEDEMA: ICD-10-CM

## 2020-08-19 DIAGNOSIS — J44.9 CHRONIC OBSTRUCTIVE PULMONARY DISEASE, UNSPECIFIED COPD TYPE (H): ICD-10-CM

## 2020-08-19 DIAGNOSIS — E87.6 HYPOKALEMIA: ICD-10-CM

## 2020-08-19 DIAGNOSIS — I50.32 CHRONIC DIASTOLIC HEART FAILURE (H): ICD-10-CM

## 2020-08-19 DIAGNOSIS — I10 ESSENTIAL HYPERTENSION: Primary | ICD-10-CM

## 2020-08-19 PROCEDURE — 80048 BASIC METABOLIC PNL TOTAL CA: CPT | Performed by: INTERNAL MEDICINE

## 2020-08-19 PROCEDURE — 99214 OFFICE O/P EST MOD 30 MIN: CPT | Performed by: INTERNAL MEDICINE

## 2020-08-19 PROCEDURE — 36415 COLL VENOUS BLD VENIPUNCTURE: CPT | Performed by: INTERNAL MEDICINE

## 2020-08-19 ASSESSMENT — MIFFLIN-ST. JEOR: SCORE: 2068.64

## 2020-08-19 NOTE — PROGRESS NOTES
Subjective     Erich Craven is a 80 year old male who presents to clinic today for the following health issues:    HPI   Chief Complaint   Patient presents with     RECHECK     F/U on labs     Patient feels that he is doing much better with some residual low energy.  No PND or angina.  He is walking 15 minutes every morning.    Follow-up on service dog      Review of Systems   Constitutional, HEENT, cardiovascular, pulmonary, gi and gu systems are negative, except as otherwise noted.      Objective    /70 (BP Location: Left arm, Patient Position: Sitting, Cuff Size: Adult Large)   Pulse 78   Temp 96.9  F (36.1  C) (Skin)   Ht 1.829 m (6')   Wt 132.6 kg (292 lb 4 oz)   SpO2 95%   BMI 39.64 kg/m    Body mass index is 39.64 kg/m .  Physical Exam   GENERAL: healthy, alert and no distress  NECK: no adenopathy, no asymmetry, masses, or scars and thyroid normal to palpation  RESP: lungs clear to auscultation - no rales, rhonchi or wheezes  CV: regular rate and rhythm, normal S1 S2, no S3 or S4, no murmur, click or rub, 2+ pretibial peripheral edema and peripheral pulses strong  ABDOMEN: soft, nontender, no hepatosplenomegaly, no masses and bowel sounds normal  MS: no gross musculoskeletal defects noted,   No JVD or HJR          Assessment & Plan     Hypokalemia  Follow-up labs to reevaluate Spironolactone, follow-up potassium supplement  - **Basic metabolic panel FUTURE anytime  - Basic metabolic panel  (Ca, Cl, CO2, Creat, Gluc, K, Na, BUN)    Essential hypertension  Well-controlled with current therapy  - Basic metabolic panel  (Ca, Cl, CO2, Creat, Gluc, K, Na, BUN)  COPD  Continue same medications  Chronic diastolic heart failure    Patient's peripheral edema seems to be more related to lymphedema and heart failure  Lymphedema  Continue to aggressively diurese, watching closely to avoid prerenal azotemia  FUTURE APPOINTMENTS:       - Follow-up visit in 1 mo    No follow-ups on file.    Dontrell Gómez,  MD  [facility]

## 2020-08-20 ENCOUNTER — TELEPHONE (OUTPATIENT)
Dept: CARDIOLOGY | Facility: CLINIC | Age: 80
End: 2020-08-20

## 2020-08-20 LAB
ANION GAP SERPL CALCULATED.3IONS-SCNC: 4 MMOL/L (ref 3–14)
BUN SERPL-MCNC: 37 MG/DL (ref 7–30)
CALCIUM SERPL-MCNC: 9.1 MG/DL (ref 8.5–10.1)
CHLORIDE SERPL-SCNC: 101 MMOL/L (ref 94–109)
CO2 SERPL-SCNC: 28 MMOL/L (ref 20–32)
CREAT SERPL-MCNC: 1.29 MG/DL (ref 0.66–1.25)
GFR SERPL CREATININE-BSD FRML MDRD: 52 ML/MIN/{1.73_M2}
GLUCOSE SERPL-MCNC: 111 MG/DL (ref 70–99)
POTASSIUM SERPL-SCNC: 5.5 MMOL/L (ref 3.4–5.3)
SODIUM SERPL-SCNC: 133 MMOL/L (ref 133–144)

## 2020-08-20 NOTE — TELEPHONE ENCOUNTER

## 2020-08-21 ENCOUNTER — ANCILLARY PROCEDURE (OUTPATIENT)
Dept: CARDIOLOGY | Facility: CLINIC | Age: 80
End: 2020-08-21
Attending: INTERNAL MEDICINE
Payer: MEDICARE

## 2020-08-21 DIAGNOSIS — Z95.0 CARDIAC PACEMAKER IN SITU: ICD-10-CM

## 2020-08-21 DIAGNOSIS — Z95.0 CARDIAC PACEMAKER IN SITU: Primary | ICD-10-CM

## 2020-08-21 DIAGNOSIS — E87.6 HYPOKALEMIA: Primary | ICD-10-CM

## 2020-08-21 DIAGNOSIS — I49.5 SICK SINUS SYNDROME (H): ICD-10-CM

## 2020-08-21 PROCEDURE — 93280 PM DEVICE PROGR EVAL DUAL: CPT | Performed by: INTERNAL MEDICINE

## 2020-08-27 ENCOUNTER — TELEPHONE (OUTPATIENT)
Dept: FAMILY MEDICINE | Facility: CLINIC | Age: 80
End: 2020-08-27

## 2020-08-27 DIAGNOSIS — E87.6 HYPOKALEMIA: ICD-10-CM

## 2020-08-27 PROCEDURE — 36415 COLL VENOUS BLD VENIPUNCTURE: CPT | Performed by: INTERNAL MEDICINE

## 2020-08-27 PROCEDURE — 80048 BASIC METABOLIC PNL TOTAL CA: CPT | Performed by: INTERNAL MEDICINE

## 2020-08-27 NOTE — TELEPHONE ENCOUNTER
Patient is requesting lab to be done to check potassium levels. Patient does not want to do virtual/telephone visit. Please advise.    Devin Yousif CMA on 8/27/2020 at 11:47 AM

## 2020-08-27 NOTE — TELEPHONE ENCOUNTER
Reason for Call:  Other Potassium    Detailed comments: Erich calling in to talk to team about his potassium. He wants to see if he has too low or too high amounts .    Please call him back. Declined virtual visits.    Phone Number Patient can be reached at: Cell number on file:    Telephone Information:   Mobile [phone]       Best Time: after 1pm    Can we leave a detailed message on this number? YES    Call taken on 8/27/2020 at 11:37 AM by Oscar Stafford

## 2020-08-27 NOTE — TELEPHONE ENCOUNTER
Patient was requested on 821 to have his lab drawn this week to follow-up on his potassium.  Orders futured and will call for scheduling and lab appointment.

## 2020-08-28 LAB
ANION GAP SERPL CALCULATED.3IONS-SCNC: 4 MMOL/L (ref 3–14)
BUN SERPL-MCNC: 46 MG/DL (ref 7–30)
CALCIUM SERPL-MCNC: 9.2 MG/DL (ref 8.5–10.1)
CHLORIDE SERPL-SCNC: 103 MMOL/L (ref 94–109)
CO2 SERPL-SCNC: 28 MMOL/L (ref 20–32)
CREAT SERPL-MCNC: 1.2 MG/DL (ref 0.66–1.25)
GFR SERPL CREATININE-BSD FRML MDRD: 56 ML/MIN/{1.73_M2}
GLUCOSE SERPL-MCNC: 97 MG/DL (ref 70–99)
POTASSIUM SERPL-SCNC: 5.3 MMOL/L (ref 3.4–5.3)
SODIUM SERPL-SCNC: 135 MMOL/L (ref 133–144)

## 2020-09-04 LAB
MDC_IDC_LEAD_IMPLANT_DT: NORMAL
MDC_IDC_LEAD_IMPLANT_DT: NORMAL
MDC_IDC_LEAD_LOCATION: NORMAL
MDC_IDC_LEAD_LOCATION: NORMAL
MDC_IDC_LEAD_LOCATION_DETAIL_1: NORMAL
MDC_IDC_LEAD_LOCATION_DETAIL_1: NORMAL
MDC_IDC_LEAD_MFG: NORMAL
MDC_IDC_LEAD_MFG: NORMAL
MDC_IDC_LEAD_MODEL: NORMAL
MDC_IDC_LEAD_MODEL: NORMAL
MDC_IDC_LEAD_POLARITY_TYPE: NORMAL
MDC_IDC_LEAD_POLARITY_TYPE: NORMAL
MDC_IDC_LEAD_SERIAL: NORMAL
MDC_IDC_LEAD_SERIAL: NORMAL
MDC_IDC_MSMT_BATTERY_STATUS: NORMAL
MDC_IDC_MSMT_LEADCHNL_RA_IMPEDANCE_VALUE: 482 OHM
MDC_IDC_MSMT_LEADCHNL_RA_PACING_THRESHOLD_AMPLITUDE: 0.4 V
MDC_IDC_MSMT_LEADCHNL_RA_PACING_THRESHOLD_PULSEWIDTH: 0.4 MS
MDC_IDC_MSMT_LEADCHNL_RA_SENSING_INTR_AMPL: 10.7 MV
MDC_IDC_MSMT_LEADCHNL_RV_IMPEDANCE_VALUE: 414 OHM
MDC_IDC_MSMT_LEADCHNL_RV_PACING_THRESHOLD_AMPLITUDE: 1 V
MDC_IDC_MSMT_LEADCHNL_RV_PACING_THRESHOLD_PULSEWIDTH: 0.4 MS
MDC_IDC_MSMT_LEADCHNL_RV_SENSING_INTR_AMPL: 4.7 MV
MDC_IDC_PG_IMPLANT_DTM: NORMAL
MDC_IDC_PG_MFG: NORMAL
MDC_IDC_PG_MODEL: NORMAL
MDC_IDC_PG_SERIAL: NORMAL
MDC_IDC_PG_TYPE: NORMAL
MDC_IDC_SESS_CLINIC_NAME: NORMAL
MDC_IDC_SESS_DTM: NORMAL
MDC_IDC_SESS_TYPE: NORMAL
MDC_IDC_SET_BRADY_AT_MODE_SWITCH_MODE: NORMAL
MDC_IDC_SET_BRADY_AT_MODE_SWITCH_RATE: 150 {BEATS}/MIN
MDC_IDC_SET_BRADY_LOWRATE: 60 {BEATS}/MIN
MDC_IDC_SET_BRADY_MAX_SENSOR_RATE: 130 {BEATS}/MIN
MDC_IDC_SET_BRADY_MAX_TRACKING_RATE: 130 {BEATS}/MIN
MDC_IDC_SET_BRADY_MODE: NORMAL
MDC_IDC_SET_BRADY_PAV_DELAY_HIGH: 220 MS
MDC_IDC_SET_BRADY_PAV_DELAY_LOW: 250 MS
MDC_IDC_SET_BRADY_SAV_DELAY_HIGH: 220 MS
MDC_IDC_SET_BRADY_SAV_DELAY_LOW: 250 MS
MDC_IDC_SET_LEADCHNL_RA_PACING_AMPLITUDE: 2 V
MDC_IDC_SET_LEADCHNL_RA_PACING_CAPTURE_MODE: NORMAL
MDC_IDC_SET_LEADCHNL_RA_PACING_POLARITY: NORMAL
MDC_IDC_SET_LEADCHNL_RA_PACING_PULSEWIDTH: 0.4 MS
MDC_IDC_SET_LEADCHNL_RA_SENSING_ADAPTATION_MODE: NORMAL
MDC_IDC_SET_LEADCHNL_RA_SENSING_POLARITY: NORMAL
MDC_IDC_SET_LEADCHNL_RA_SENSING_SENSITIVITY: 0.5 MV
MDC_IDC_SET_LEADCHNL_RV_PACING_AMPLITUDE: 2.1 V
MDC_IDC_SET_LEADCHNL_RV_PACING_CAPTURE_MODE: NORMAL
MDC_IDC_SET_LEADCHNL_RV_PACING_POLARITY: NORMAL
MDC_IDC_SET_LEADCHNL_RV_PACING_PULSEWIDTH: 0.4 MS
MDC_IDC_SET_LEADCHNL_RV_SENSING_ADAPTATION_MODE: NORMAL
MDC_IDC_SET_LEADCHNL_RV_SENSING_POLARITY: NORMAL
MDC_IDC_SET_LEADCHNL_RV_SENSING_SENSITIVITY: 1 MV
MDC_IDC_SET_ZONE_DETECTION_INTERVAL: 375 MS
MDC_IDC_SET_ZONE_TYPE: NORMAL
MDC_IDC_SET_ZONE_VENDOR_TYPE: NORMAL
MDC_IDC_STAT_EPISODE_RECENT_COUNT: 293
MDC_IDC_STAT_EPISODE_RECENT_COUNT_DTM_END: NORMAL
MDC_IDC_STAT_EPISODE_RECENT_COUNT_DTM_START: NORMAL
MDC_IDC_STAT_EPISODE_TOTAL_COUNT: 456
MDC_IDC_STAT_EPISODE_TOTAL_COUNT_DTM_END: NORMAL
MDC_IDC_STAT_EPISODE_TYPE: NORMAL
MDC_IDC_STAT_EPISODE_TYPE: NORMAL
MDC_IDC_STAT_EPISODE_VENDOR_TYPE: NORMAL
MDC_IDC_STAT_EPISODE_VENDOR_TYPE: NORMAL

## 2020-09-09 ENCOUNTER — ANTICOAGULATION THERAPY VISIT (OUTPATIENT)
Dept: FAMILY MEDICINE | Facility: CLINIC | Age: 80
End: 2020-09-09

## 2020-09-09 DIAGNOSIS — I26.99 PE (PULMONARY THROMBOEMBOLISM) (H): ICD-10-CM

## 2020-09-09 DIAGNOSIS — I82.4Z9 DEEP VEIN THROMBOSIS (DVT) OF DISTAL VEIN OF LOWER EXTREMITY, UNSPECIFIED CHRONICITY, UNSPECIFIED LATERALITY (H): ICD-10-CM

## 2020-09-09 DIAGNOSIS — I10 ESSENTIAL HYPERTENSION: Primary | ICD-10-CM

## 2020-09-09 DIAGNOSIS — I48.0 PAROXYSMAL A-FIB (H): ICD-10-CM

## 2020-09-09 LAB
CAPILLARY BLOOD COLLECTION: NORMAL
INR PPP: 2.6 (ref 0.86–1.14)

## 2020-09-09 PROCEDURE — 36416 COLLJ CAPILLARY BLOOD SPEC: CPT | Performed by: INTERNAL MEDICINE

## 2020-09-09 PROCEDURE — 85610 PROTHROMBIN TIME: CPT | Performed by: INTERNAL MEDICINE

## 2020-09-09 PROCEDURE — 99207 ZZC NO CHARGE NURSE ONLY: CPT | Performed by: INTERNAL MEDICINE

## 2020-09-09 PROCEDURE — 80048 BASIC METABOLIC PNL TOTAL CA: CPT | Performed by: INTERNAL MEDICINE

## 2020-09-09 NOTE — PROGRESS NOTES
ANTICOAGULATION FOLLOW-UP CLINIC VISIT    Patient Name:  Erich Craven  Date:  2020  Contact Type:  Telephone    SUBJECTIVE:  Patient Findings     Comments:   No changes in diet, activity level, medications (including over the counter), or health. No missed doses of warfarin. Patient took dosing as prescribed. No signs of clots or bleeding concerns. Patient will continue maintenance warfarin dosing.  Patient wanted a text reminder for appointments. Writer did try to set this up in communication preferences. He does not have mychart.         Clinical Outcomes     Negatives:   Major bleeding event, Thromboembolic event, Anticoagulation-related hospital admission, Anticoagulation-related ED visit, Anticoagulation-related fatality    Comments:   No changes in diet, activity level, medications (including over the counter), or health. No missed doses of warfarin. Patient took dosing as prescribed. No signs of clots or bleeding concerns. Patient will continue maintenance warfarin dosing.  Patient wanted a text reminder for appointments. Writer did try to set this up in communication preferences. He does not have mychart.            OBJECTIVE    Recent labs: (last 7 days)     20  1422   INR 2.60*       ASSESSMENT / PLAN  INR assessment THER    Recheck INR In: 7 WEEKS    INR Location Clinic      Anticoagulation Summary  As of 2020    INR goal:   2.0-3.0   TTR:   80.7 % (1 y)   INR used for dosin.60 (2020)   Warfarin maintenance plan:   7.5 mg (5 mg x 1.5) every Wed; 5 mg (5 mg x 1) all other days   Full warfarin instructions:   7.5 mg every Wed; 5 mg all other days   Weekly warfarin total:   37.5 mg   No change documented:   Elvia Castellano RN   Plan last modified:   Delia Jang RN (1/15/2020)   Next INR check:   10/28/2020   Priority:   Maintenance   Target end date:   Indefinite    Indications    PE (pulmonary thromboembolism) (H) [I26.99]  Paroxysmal A-fib (H) [I48.0]  Deep vein thrombosis  (DVT) of distal vein of lower extremity  unspecified chronicity  unspecified laterality (H) [I82.4Z9]             Anticoagulation Episode Summary     INR check location:       Preferred lab:       Send INR reminders to:   SERENITY BECERRA    Comments:         Anticoagulation Care Providers     Provider Role Specialty Phone number    Dontrell Gómez MD Referring Internal Medicine [phone]            See the Encounter Report to view Anticoagulation Flowsheet and Dosing Calendar (Go to Encounters tab in chart review, and find the Anticoagulation Therapy Visit)        Elvia Castellano RN

## 2020-09-10 LAB
ANION GAP SERPL CALCULATED.3IONS-SCNC: 7 MMOL/L (ref 3–14)
BUN SERPL-MCNC: 48 MG/DL (ref 7–30)
CALCIUM SERPL-MCNC: 8.4 MG/DL (ref 8.5–10.1)
CHLORIDE SERPL-SCNC: 104 MMOL/L (ref 94–109)
CO2 SERPL-SCNC: 24 MMOL/L (ref 20–32)
CREAT SERPL-MCNC: 1.58 MG/DL (ref 0.66–1.25)
GFR SERPL CREATININE-BSD FRML MDRD: 40 ML/MIN/{1.73_M2}
GLUCOSE SERPL-MCNC: 106 MG/DL (ref 70–99)
POTASSIUM SERPL-SCNC: 5.4 MMOL/L (ref 3.4–5.3)
SODIUM SERPL-SCNC: 135 MMOL/L (ref 133–144)

## 2020-09-13 DIAGNOSIS — I50.32 CHRONIC DIASTOLIC HEART FAILURE (H): ICD-10-CM

## 2020-09-15 RX ORDER — POTASSIUM CHLORIDE 750 MG/1
TABLET, EXTENDED RELEASE ORAL
Qty: 270 TABLET | Refills: 1 | OUTPATIENT
Start: 2020-09-15

## 2020-09-20 NOTE — RESULT ENCOUNTER NOTE
I called the patient and informed of results. The following changes were made to treatment:   Recommended a low potassium diet and we reduced his spironolactone to 1/2 tablet daily.  He is returning to clinic in 1 week for a reevaluation.    Patient voices understanding and will contact me with any further questions.     Dontrell Gómez MD

## 2020-09-24 ENCOUNTER — OFFICE VISIT (OUTPATIENT)
Dept: FAMILY MEDICINE | Facility: CLINIC | Age: 80
End: 2020-09-24
Payer: MEDICARE

## 2020-09-24 VITALS
OXYGEN SATURATION: 95 % | BODY MASS INDEX: 39.14 KG/M2 | HEIGHT: 72 IN | SYSTOLIC BLOOD PRESSURE: 96 MMHG | WEIGHT: 289 LBS | HEART RATE: 76 BPM | DIASTOLIC BLOOD PRESSURE: 62 MMHG | TEMPERATURE: 97.1 F

## 2020-09-24 DIAGNOSIS — F41.9 ANXIETY: ICD-10-CM

## 2020-09-24 DIAGNOSIS — E66.01 MORBID OBESITY (H): Primary | ICD-10-CM

## 2020-09-24 DIAGNOSIS — E03.9 HYPOTHYROIDISM, UNSPECIFIED TYPE: ICD-10-CM

## 2020-09-24 DIAGNOSIS — I25.10 CORONARY ARTERY DISEASE INVOLVING NATIVE HEART WITHOUT ANGINA PECTORIS, UNSPECIFIED VESSEL OR LESION TYPE: ICD-10-CM

## 2020-09-24 DIAGNOSIS — E11.9 TYPE 2 DIABETES MELLITUS WITHOUT COMPLICATION, WITHOUT LONG-TERM CURRENT USE OF INSULIN (H): ICD-10-CM

## 2020-09-24 DIAGNOSIS — I26.99 PE (PULMONARY THROMBOEMBOLISM) (H): ICD-10-CM

## 2020-09-24 DIAGNOSIS — G47.33 OSA (OBSTRUCTIVE SLEEP APNEA): ICD-10-CM

## 2020-09-24 DIAGNOSIS — J44.9 CHRONIC OBSTRUCTIVE PULMONARY DISEASE, UNSPECIFIED COPD TYPE (H): ICD-10-CM

## 2020-09-24 DIAGNOSIS — I50.33 ACUTE ON CHRONIC HEART FAILURE WITH PRESERVED EJECTION FRACTION (H): ICD-10-CM

## 2020-09-24 PROCEDURE — 99214 OFFICE O/P EST MOD 30 MIN: CPT | Performed by: INTERNAL MEDICINE

## 2020-09-24 PROCEDURE — 36415 COLL VENOUS BLD VENIPUNCTURE: CPT | Performed by: INTERNAL MEDICINE

## 2020-09-24 PROCEDURE — 80048 BASIC METABOLIC PNL TOTAL CA: CPT | Performed by: INTERNAL MEDICINE

## 2020-09-24 ASSESSMENT — MIFFLIN-ST. JEOR: SCORE: 2053.9

## 2020-09-24 NOTE — LETTER
October 1, 2020      Erich Craven  [address]  [place] [address]        Dear ,    We are writing to inform you of your test results.    Enclosed is your basic metabolic panel which shows that the minerals and electrolytes are all normal and your kidney function study is stable.  I recommend continuing the same routine and reevaluating your fluid status in approximately 1 month.  Have any questions regarding this report please let me know.     Thanks, GB     Resulted Orders   Basic metabolic panel   Result Value Ref Range    Sodium 134 133 - 144 mmol/L    Potassium 5.3 3.4 - 5.3 mmol/L    Chloride 103 94 - 109 mmol/L    Carbon Dioxide 23 20 - 32 mmol/L    Anion Gap 8 3 - 14 mmol/L    Glucose 105 (H) 70 - 99 mg/dL    Urea Nitrogen 49 (H) 7 - 30 mg/dL    Creatinine 1.69 (H) 0.66 - 1.25 mg/dL    GFR Estimate 37 (L) >60 mL/min/[1.73_m2]      Comment:      Non  GFR Calc  Starting 12/18/2018, serum creatinine based estimated GFR (eGFR) will be   calculated using the Chronic Kidney Disease Epidemiology Collaboration   (CKD-EPI) equation.      GFR Estimate If Black 43 (L) >60 mL/min/[1.73_m2]      Comment:       GFR Calc  Starting 12/18/2018, serum creatinine based estimated GFR (eGFR) will be   calculated using the Chronic Kidney Disease Epidemiology Collaboration   (CKD-EPI) equation.      Calcium 8.7 8.5 - 10.1 mg/dL       If you have any questions or concerns, please call the clinic at the number listed above.       Sincerely,        Dontrell Gómez MD/dayron

## 2020-09-24 NOTE — PROGRESS NOTES
Subjective     Erich Craven is a 80 year old male who presents to clinic today for the following health issues:    HPI   80-year-old white male with COPD, sleep apnea with morbid obesity and a history of PE who presents for follow-up of his acute on chronic right-sided heart failure associated with recent progressive CKD and hyperkalemia related to his diuretic therapy.  His spironolactone was reduced and he presents for further evaluation and follow-up of his abnormal BMP.    Follow up labs      Drug and lactation database from the [place] [employer]:  http://toxnet.nlm.nih.gov/cgi-bin/sis/htmlgen?LACT      Review of Systems   Constitutional, HEENT, cardiovascular, pulmonary, gi and gu systems are negative, except as otherwise noted.      Objective    BP 96/62 (BP Location: Left arm, Patient Position: Sitting, Cuff Size: Adult Large)   Pulse 76   Temp 97.1  F (36.2  C) (Temporal)   Ht 1.829 m (6')   Wt 131.1 kg (289 lb)   SpO2 95%   BMI 39.20 kg/m    Body mass index is 39.2 kg/m .  Physical Exam no associated symptoms with his noted blood pressure  GENERAL: healthy, alert and no distress  NECK: no adenopathy, no asymmetry, masses, or scars and thyroid normal to palpation  RESP: lungs clear to auscultation - no rales, rhonchi or wheezes  CV: regular rate and rhythm, normal S1 S2, no S3 or S4, no murmur, click or rub,and peripheral pulses strong  ABDOMEN: soft, nontender, no hepatosplenomegaly, no masses and bowel sounds normal  MS: no gross musculoskeletal defects noted, 1+ pretibial edema without tenderness or palpable cords            Assessment & Plan     Morbid obesity (H)  Ongoing work in progress    Acute on chronic heart failure with preserved ejection fraction (H)  Managing well with current therapy  - Basic metabolic panel    Coronary artery disease involving native heart without angina pectoris, unspecified vessel or lesion type  No ongoing signs or symptoms with current  level of activity    Chronic obstructive pulmonary disease, unspecified COPD type (H)  Managing well with current therapy    PE (pulmonary thromboembolism) (H)  Continue longstanding Coumadin    RIN (obstructive sleep apnea)  Managing well by report    Hypothyroidism, unspecified type  No change in therapy    Anxiety  No current symptoms    Type 2 diabetes mellitus without complication, without long-term current use of insulin (H)    Managing with diet and activity     BMI:   Estimated body mass index is 39.2 kg/m  as calculated from the following:    Height as of this encounter: 1.829 m (6').    Weight as of this encounter: 131.1 kg (289 lb).           FUTURE APPOINTMENTS:       - Follow-up visit in 1 mo    No follow-ups on file.    Dontrell Gómez MD  [facility]

## 2020-09-25 LAB
ANION GAP SERPL CALCULATED.3IONS-SCNC: 8 MMOL/L (ref 3–14)
BUN SERPL-MCNC: 49 MG/DL (ref 7–30)
CALCIUM SERPL-MCNC: 8.7 MG/DL (ref 8.5–10.1)
CHLORIDE SERPL-SCNC: 103 MMOL/L (ref 94–109)
CO2 SERPL-SCNC: 23 MMOL/L (ref 20–32)
CREAT SERPL-MCNC: 1.69 MG/DL (ref 0.66–1.25)
GFR SERPL CREATININE-BSD FRML MDRD: 37 ML/MIN/{1.73_M2}
GLUCOSE SERPL-MCNC: 105 MG/DL (ref 70–99)
POTASSIUM SERPL-SCNC: 5.3 MMOL/L (ref 3.4–5.3)
SODIUM SERPL-SCNC: 134 MMOL/L (ref 133–144)

## 2020-10-15 DIAGNOSIS — I48.91 ATRIAL FIBRILLATION (H): ICD-10-CM

## 2020-10-16 RX ORDER — WARFARIN SODIUM 5 MG/1
TABLET ORAL
Qty: 100 TABLET | Refills: 1 | Status: SHIPPED | OUTPATIENT
Start: 2020-10-16 | End: 2021-05-21

## 2020-10-16 NOTE — TELEPHONE ENCOUNTER
Dong Eduardo    Please review and authorize if appropriate,     Thank you,   Carmen GRIMALDO RN

## 2020-10-16 NOTE — TELEPHONE ENCOUNTER
Refill Request  Warfarin 5 mg  Current Dosing Instructions: 7.5 mg W and 5 mg ROW  Tabs: 100  Refills: 0  Last INR: 9/9/20  Next INR due: 10/28/20  Last OV with responsible provider: 9/24/20

## 2020-10-19 DIAGNOSIS — F43.21 ADJUSTMENT DISORDER WITH DEPRESSED MOOD: ICD-10-CM

## 2020-10-19 NOTE — TELEPHONE ENCOUNTER
Reason for Call:  Medication or medication refill:    Do you use a [facility]?  Name of the pharmacy and phone number for the current request:      [facility] [address] [facility], [place] [address]      Name of the medication requested:   clonazePAM (KLONOPIN) 0.5 MG tablet 60 tablet       Other request: patient out of med tomorrow, hoping this can be filled soon    Can we leave a detailed message on this number? YES    Phone number patient can be reached at: Cell number on file:    Telephone Information:   Mobile [phone]       Best Time: any    Call taken on 10/19/2020 at 1:59 PM by Che Alonso

## 2020-10-19 NOTE — TELEPHONE ENCOUNTER
Last Rx 7/31/2020   #60 with 2 refills     Last visit 9/24/2020     Filled 7/31/2020 8/27/2020 9/24/2020     Per fill dates patient should not be out however per below, pt reports he will be out tomorrow     Please advise     Keisha MARTIN RN

## 2020-10-20 RX ORDER — CLONAZEPAM 0.5 MG/1
TABLET ORAL
Qty: 60 TABLET | Refills: 2 | Status: SHIPPED | OUTPATIENT
Start: 2020-10-20 | End: 2021-01-08

## 2020-10-27 DIAGNOSIS — I10 ESSENTIAL HYPERTENSION: ICD-10-CM

## 2020-10-29 RX ORDER — DILTIAZEM HYDROCHLORIDE 300 MG/1
CAPSULE, COATED, EXTENDED RELEASE ORAL
Qty: 90 CAPSULE | Refills: 3 | Status: SHIPPED | OUTPATIENT
Start: 2020-10-29 | End: 2021-07-20

## 2020-10-29 NOTE — TELEPHONE ENCOUNTER
Routing refill request to provider for review/approval because:  Labs out of range:  Creat.  Pt is scheduled with PCP next week.  Please authorize if appropriate.  Thanks,  Alba Flood RN

## 2020-10-31 DIAGNOSIS — I50.32 CHRONIC DIASTOLIC HEART FAILURE (H): ICD-10-CM

## 2020-10-31 NOTE — TELEPHONE ENCOUNTER
FUROSEMIDE 20 MG TABLET    Summary: TAKE 3 TABLETS BY MOUTH TWICE DAILY., Disp-540 tablet, R-3, E-Prescribe   Start: 11/13/2019  Ord/Sold: 11/13/2019     Patient taking differently: TAKE 2 TABLETS BY MOUTH TWICE DAILY., Reported on 8/11/2020

## 2020-11-03 ENCOUNTER — ANTICOAGULATION THERAPY VISIT (OUTPATIENT)
Dept: NURSING | Facility: CLINIC | Age: 80
End: 2020-11-03

## 2020-11-03 ENCOUNTER — OFFICE VISIT (OUTPATIENT)
Dept: FAMILY MEDICINE | Facility: CLINIC | Age: 80
End: 2020-11-03
Payer: MEDICARE

## 2020-11-03 VITALS
WEIGHT: 288 LBS | HEART RATE: 84 BPM | DIASTOLIC BLOOD PRESSURE: 61 MMHG | BODY MASS INDEX: 39.01 KG/M2 | TEMPERATURE: 96.9 F | HEIGHT: 72 IN | SYSTOLIC BLOOD PRESSURE: 109 MMHG | OXYGEN SATURATION: 90 %

## 2020-11-03 DIAGNOSIS — I48.0 PAROXYSMAL A-FIB (H): ICD-10-CM

## 2020-11-03 DIAGNOSIS — E87.6 HYPOKALEMIA: ICD-10-CM

## 2020-11-03 DIAGNOSIS — F43.21 ADJUSTMENT DISORDER WITH DEPRESSED MOOD: ICD-10-CM

## 2020-11-03 DIAGNOSIS — I50.33 ACUTE ON CHRONIC HEART FAILURE WITH PRESERVED EJECTION FRACTION (H): ICD-10-CM

## 2020-11-03 DIAGNOSIS — I50.32 CHRONIC DIASTOLIC HEART FAILURE (H): Primary | ICD-10-CM

## 2020-11-03 DIAGNOSIS — I26.99 PE (PULMONARY THROMBOEMBOLISM) (H): ICD-10-CM

## 2020-11-03 DIAGNOSIS — J44.9 CHRONIC OBSTRUCTIVE PULMONARY DISEASE, UNSPECIFIED COPD TYPE (H): ICD-10-CM

## 2020-11-03 DIAGNOSIS — I82.4Z9 DEEP VEIN THROMBOSIS (DVT) OF DISTAL VEIN OF LOWER EXTREMITY, UNSPECIFIED CHRONICITY, UNSPECIFIED LATERALITY (H): ICD-10-CM

## 2020-11-03 DIAGNOSIS — E66.01 MORBID OBESITY (H): ICD-10-CM

## 2020-11-03 LAB
CAPILLARY BLOOD COLLECTION: NORMAL
INR PPP: 3 (ref 0.86–1.14)
NT-PROBNP SERPL-MCNC: 54 PG/ML (ref 0–450)

## 2020-11-03 PROCEDURE — 99214 OFFICE O/P EST MOD 30 MIN: CPT | Performed by: INTERNAL MEDICINE

## 2020-11-03 PROCEDURE — 36415 COLL VENOUS BLD VENIPUNCTURE: CPT | Performed by: INTERNAL MEDICINE

## 2020-11-03 PROCEDURE — 85610 PROTHROMBIN TIME: CPT | Performed by: INTERNAL MEDICINE

## 2020-11-03 PROCEDURE — 80048 BASIC METABOLIC PNL TOTAL CA: CPT | Performed by: INTERNAL MEDICINE

## 2020-11-03 PROCEDURE — 83880 ASSAY OF NATRIURETIC PEPTIDE: CPT | Performed by: INTERNAL MEDICINE

## 2020-11-03 ASSESSMENT — MIFFLIN-ST. JEOR: SCORE: 2049.36

## 2020-11-03 NOTE — LETTER
November 5, 2020      Roberto Carlos Towbrea  [address]  [place] [address]        Rena Zamorano your lab reports from your recent office exam.   The basic metabolic panel shows that your minerals and electrolytes were all normal and your kidney function tests suggest that were slightly dehydrating your kidneys.  Recommend that you reduce the Lasix to taking only 3 tablets daily instead of 4.  You could take 2 tablets in the morning and 1 in the afternoon.  We will want to monitor the leg swelling closely.     The BNP test shows that the leg swelling that you are having is not related to congestive heart failure.     I recommend having you come back to see me in 1 month to reevaluate this.  For having questions regarding these reports please let me know.  Otherwise, I will anticipate seeing you back in the office in approximately 1 month.         Thanks,       GB     Resulted Orders   Basic metabolic panel   Result Value Ref Range    Sodium 134 133 - 144 mmol/L    Potassium 4.9 3.4 - 5.3 mmol/L    Chloride 99 94 - 109 mmol/L    Carbon Dioxide 29 20 - 32 mmol/L    Anion Gap 6 3 - 14 mmol/L    Glucose 92 70 - 99 mg/dL    Urea Nitrogen 64 (H) 7 - 30 mg/dL    Creatinine 1.83 (H) 0.66 - 1.25 mg/dL    GFR Estimate 34 (L) >60 mL/min/[1.73_m2]      Comment:      Non  GFR Calc  Starting 12/18/2018, serum creatinine based estimated GFR (eGFR) will be   calculated using the Chronic Kidney Disease Epidemiology Collaboration   (CKD-EPI) equation.      GFR Estimate If Black 39 (L) >60 mL/min/[1.73_m2]      Comment:       GFR Calc  Starting 12/18/2018, serum creatinine based estimated GFR (eGFR) will be   calculated using the Chronic Kidney Disease Epidemiology Collaboration   (CKD-EPI) equation.      Calcium 9.4 8.5 - 10.1 mg/dL   BNP-N terminal pro   Result Value Ref Range    N-Terminal Pro Bnp 54 0 - 450 pg/mL      Comment:         Reference range shown and results flagged as abnormal  are for the outpatient,   non acute settings. Establishing a baseline value for each individual patient   is useful for follow-up.  Suggested inpatient cut points for confirming diagnosis of CHF in an acute   setting are:   >450 pg/mL (age 18 to less than 50)   >900 pg/mL (age 50 to less than 75)   >1800 pg/mL (75 yrs and older)  An inpatient or emergency department NT-proPBNP <300 pg/mL effectively rules   out acute CHF, with 99% negative predictive value.

## 2020-11-03 NOTE — TELEPHONE ENCOUNTER
Component      Latest Ref Rng & Units 8/3/2020 8/19/2020 8/27/2020 9/9/2020   Sodium      133 - 144 mmol/L 135 133 135 135   Potassium      3.4 - 5.3 mmol/L 4.9 5.5 (H) 5.3 5.4 (H)   Chloride      94 - 109 mmol/L 100 101 103 104   Carbon Dioxide      20 - 32 mmol/L 30 28 28 24   Anion Gap      3 - 14 mmol/L 5 4 4 7   Glucose      70 - 99 mg/dL 138 (H) 111 (H) 97 106 (H)   Urea Nitrogen      7 - 30 mg/dL 22 37 (H) 46 (H) 48 (H)   Creatinine      0.66 - 1.25 mg/dL 1.15 1.29 (H) 1.20 1.58 (H)   GFR Estimate      >60 mL/min/1.73:m2 59 (L) 52 (L) 56 (L) 40 (L)   GFR Estimate If Black      >60 mL/min/1.73:m2 69 60 (L) 65 47 (L)   Calcium      8.5 - 10.1 mg/dL 9.7 9.1 9.2 8.4 (L)     Component      Latest Ref Rng & Units 9/24/2020   Sodium      133 - 144 mmol/L 134   Potassium      3.4 - 5.3 mmol/L 5.3   Chloride      94 - 109 mmol/L 103   Carbon Dioxide      20 - 32 mmol/L 23   Anion Gap      3 - 14 mmol/L 8   Glucose      70 - 99 mg/dL 105 (H)   Urea Nitrogen      7 - 30 mg/dL 49 (H)   Creatinine      0.66 - 1.25 mg/dL 1.69 (H)   GFR Estimate      >60 mL/min/1.73:m2 37 (L)   GFR Estimate If Black      >60 mL/min/1.73:m2 43 (L)   Calcium      8.5 - 10.1 mg/dL 8.7

## 2020-11-03 NOTE — PROGRESS NOTES
Anticoagulation Management    Unable to reach Jaun today.    Today's INR result of 3.0 is therapeutic (goal INR of 2.0-3.0).  Result received from: Clinic Lab    Follow up required to confirm warfarin dose taken and assess for changes    Pt to call  INR at [phone]; if no answer then call Central INR at [phone]. Left message to continue current dose of warfarin 5 mg tonight.    Anticoagulation clinic to follow up    Paola Bee RN    ANTICOAGULATION FOLLOW-UP CLINIC VISIT    Patient Name:  Erich Craven  Date:  11/4/2020  Contact Type:  Telephone    SUBJECTIVE:  Patient Findings     Comments:  Patient returned call. The patient was assessed for diet, medication, and activity level changes, missed or extra doses, bruising or bleeding, with no problem findings. States he is cutting carbs to lose a bit a weight. Reviewed maintenance warfarin dosing with patient. Patient will remain on the same dose until next INR check. Advised keeping up with his green veggie intake to keep INR under 3.0. Patient stated understanding and is in agreement with plan. No other questions or concerns. Scheduled next lab-only INR in 6 weeks.  Paola HATHAWAY RN  Anticoagulation Clinic  [place]          Clinical Outcomes     Negatives:  Major bleeding event, Thromboembolic event, Anticoagulation-related hospital admission, Anticoagulation-related ED visit, Anticoagulation-related fatality    Comments:  Patient returned call. The patient was assessed for diet, medication, and activity level changes, missed or extra doses, bruising or bleeding, with no problem findings. States he is cutting carbs to lose a bit a weight. Reviewed maintenance warfarin dosing with patient. Patient will remain on the same dose until next INR check. Advised keeping up with his green veggie intake to keep INR under 3.0. Patient stated understanding and is in agreement with plan. No other questions or concerns. Scheduled next lab-only INR in 6  weeks.  Paola HATHAWAY RN  Anticoagulation Clinic  [place]             OBJECTIVE    Recent labs: (last 7 days)     11/03/20  1450   INR 3.00*       ASSESSMENT / PLAN  INR assessment THER    Recheck INR In: 6 WEEKS    INR Location Clinic      Anticoagulation Summary  As of 11/3/2020    INR goal:  2.0-3.0   TTR:  85.4 % (1 y)   INR used for dosing:  3.00 (11/3/2020)   Warfarin maintenance plan:  7.5 mg (5 mg x 1.5) every Wed; 5 mg (5 mg x 1) all other days   Full warfarin instructions:  7.5 mg every Wed; 5 mg all other days   Weekly warfarin total:  37.5 mg   No change documented:  Evita Lloyd RN   Plan last modified:  Delia Jang RN (1/15/2020)   Next INR check:  12/15/2020   Priority:  Maintenance   Target end date:  Indefinite    Indications    PE (pulmonary thromboembolism) (H) [I26.99]  Paroxysmal A-fib (H) [I48.0]  Deep vein thrombosis (DVT) of distal vein of lower extremity  unspecified chronicity  unspecified laterality (H) [I82.4Z9]             Anticoagulation Episode Summary     INR check location:      Preferred lab:      Send INR reminders to:  SERENITY BECERRA    Comments:        Anticoagulation Care Providers     Provider Role Specialty Phone number    Dontrell Gómez MD Referring Internal Medicine [phone]            See the Encounter Report to view Anticoagulation Flowsheet and Dosing Calendar (Go to Encounters tab in chart review, and find the Anticoagulation Therapy Visit)    Paola Bee RN

## 2020-11-04 DIAGNOSIS — I50.32 CHRONIC DIASTOLIC HEART FAILURE (H): ICD-10-CM

## 2020-11-04 LAB
ANION GAP SERPL CALCULATED.3IONS-SCNC: 6 MMOL/L (ref 3–14)
BUN SERPL-MCNC: 64 MG/DL (ref 7–30)
CALCIUM SERPL-MCNC: 9.4 MG/DL (ref 8.5–10.1)
CHLORIDE SERPL-SCNC: 99 MMOL/L (ref 94–109)
CO2 SERPL-SCNC: 29 MMOL/L (ref 20–32)
CREAT SERPL-MCNC: 1.83 MG/DL (ref 0.66–1.25)
GFR SERPL CREATININE-BSD FRML MDRD: 34 ML/MIN/{1.73_M2}
GLUCOSE SERPL-MCNC: 92 MG/DL (ref 70–99)
POTASSIUM SERPL-SCNC: 4.9 MMOL/L (ref 3.4–5.3)
SODIUM SERPL-SCNC: 134 MMOL/L (ref 133–144)

## 2020-11-04 RX ORDER — FUROSEMIDE 20 MG
40 TABLET ORAL 2 TIMES DAILY
Qty: 360 TABLET | Refills: 3 | Status: SHIPPED | OUTPATIENT
Start: 2020-11-04 | End: 2020-11-05

## 2020-11-04 NOTE — PROGRESS NOTES
Anticoagulation Management    Unable to reach Jaun today. Attempt #2      left message to call back, transfer to [phone]      Anticoagulation clinic to follow up    Evita Lloyd RN

## 2020-11-05 ENCOUNTER — TRANSFERRED RECORDS (OUTPATIENT)
Dept: MULTI SPECIALTY CLINIC | Facility: CLINIC | Age: 80
End: 2020-11-05

## 2020-11-05 ENCOUNTER — TELEPHONE (OUTPATIENT)
Dept: FAMILY MEDICINE | Facility: CLINIC | Age: 80
End: 2020-11-05

## 2020-11-05 LAB — RETINOPATHY: NORMAL

## 2020-11-05 RX ORDER — FUROSEMIDE 20 MG
40 TABLET ORAL 2 TIMES DAILY
Qty: 360 TABLET | Refills: 3 | Status: SHIPPED | OUTPATIENT
Start: 2020-11-05 | End: 2021-11-02

## 2020-11-05 NOTE — TELEPHONE ENCOUNTER
Chart reviewed for appropriateness of extended INR interval.    TTR 85%    INR stable past 9 months, +/- 0.1 from goal INR 2-3, with stable warfarin dose 37.55mg/week.  Anticoagulation Monitoring INR Value INR Value   Latest Ref Rng & Units 0.86 - 1.14 0.86 - 1.14   11/3/2020  3.00 (H)   11/3/2020     9/9/2020  2.60 (H)   7/14/2020  2.10 (H)   7/14/2020 5/8/2020  2.80 (H)   2/25/2020 2.1 (A)    2/25/2020     2/10/2020 3.1 (A)    2/10/2020     2/1/2020  2.15 (H)   1/24/2020 3.0 (A)    1/24/2020 1/22/2020  2.41 (H)   1/15/2020 3.9 (A)        Patient may extend INR interval up to 8-10 weeks if no S/S bleeding, clotting, illness or changes in medications that affect warfarin levels    Sherin Santillan, PharmD BCACP  Anticoagulation Clinical Pharmacist

## 2020-11-05 NOTE — TELEPHONE ENCOUNTER
Left a detailed voicemail advising pt that he has been approved to recheck INR's every 8-10 weeks if stable. However, pt encouraged to keep his next INR appointment scheduled 6 weeks from now on 12/15/20 since the last INR encounter notes some diet changes. Pt advised if his 12/15/20 INR is stable/therapeutic than can recheck next INR 8-10 weeks out. Pt given the central INR number to call to discuss further if needed.

## 2020-11-23 ENCOUNTER — ANCILLARY PROCEDURE (OUTPATIENT)
Dept: CARDIOLOGY | Facility: CLINIC | Age: 80
End: 2020-11-23
Attending: INTERNAL MEDICINE
Payer: MEDICARE

## 2020-11-23 DIAGNOSIS — I49.5 SICK SINUS SYNDROME (H): ICD-10-CM

## 2020-11-23 DIAGNOSIS — Z95.0 CARDIAC PACEMAKER IN SITU: ICD-10-CM

## 2020-11-23 PROCEDURE — 93296 REM INTERROG EVL PM/IDS: CPT | Performed by: INTERNAL MEDICINE

## 2020-11-23 PROCEDURE — 93294 REM INTERROG EVL PM/LDLS PM: CPT | Performed by: INTERNAL MEDICINE

## 2020-12-01 LAB
MDC_IDC_EPISODE_DTM: NORMAL
MDC_IDC_EPISODE_DURATION: 6 S
MDC_IDC_EPISODE_ID: NORMAL
MDC_IDC_EPISODE_TYPE: NORMAL
MDC_IDC_LEAD_IMPLANT_DT: NORMAL
MDC_IDC_LEAD_IMPLANT_DT: NORMAL
MDC_IDC_LEAD_LOCATION: NORMAL
MDC_IDC_LEAD_LOCATION: NORMAL
MDC_IDC_LEAD_LOCATION_DETAIL_1: NORMAL
MDC_IDC_LEAD_LOCATION_DETAIL_1: NORMAL
MDC_IDC_LEAD_MFG: NORMAL
MDC_IDC_LEAD_MFG: NORMAL
MDC_IDC_LEAD_MODEL: NORMAL
MDC_IDC_LEAD_MODEL: NORMAL
MDC_IDC_LEAD_POLARITY_TYPE: NORMAL
MDC_IDC_LEAD_POLARITY_TYPE: NORMAL
MDC_IDC_LEAD_SERIAL: NORMAL
MDC_IDC_LEAD_SERIAL: NORMAL
MDC_IDC_MSMT_BATTERY_DTM: NORMAL
MDC_IDC_MSMT_BATTERY_REMAINING_LONGEVITY: 36 MO
MDC_IDC_MSMT_BATTERY_REMAINING_PERCENTAGE: 57 %
MDC_IDC_MSMT_BATTERY_STATUS: NORMAL
MDC_IDC_MSMT_LEADCHNL_RA_IMPEDANCE_VALUE: 448 OHM
MDC_IDC_MSMT_LEADCHNL_RA_PACING_THRESHOLD_AMPLITUDE: 0.4 V
MDC_IDC_MSMT_LEADCHNL_RA_PACING_THRESHOLD_PULSEWIDTH: 0.4 MS
MDC_IDC_MSMT_LEADCHNL_RV_IMPEDANCE_VALUE: 376 OHM
MDC_IDC_MSMT_LEADCHNL_RV_PACING_THRESHOLD_AMPLITUDE: 1.6 V
MDC_IDC_MSMT_LEADCHNL_RV_PACING_THRESHOLD_PULSEWIDTH: 0.4 MS
MDC_IDC_PG_IMPLANT_DTM: NORMAL
MDC_IDC_PG_MFG: NORMAL
MDC_IDC_PG_MODEL: NORMAL
MDC_IDC_PG_SERIAL: NORMAL
MDC_IDC_PG_TYPE: NORMAL
MDC_IDC_SESS_CLINIC_NAME: NORMAL
MDC_IDC_SESS_DTM: NORMAL
MDC_IDC_SESS_TYPE: NORMAL
MDC_IDC_SET_BRADY_AT_MODE_SWITCH_MODE: NORMAL
MDC_IDC_SET_BRADY_AT_MODE_SWITCH_RATE: 150 {BEATS}/MIN
MDC_IDC_SET_BRADY_LOWRATE: 60 {BEATS}/MIN
MDC_IDC_SET_BRADY_MAX_SENSOR_RATE: 130 {BEATS}/MIN
MDC_IDC_SET_BRADY_MAX_TRACKING_RATE: 130 {BEATS}/MIN
MDC_IDC_SET_BRADY_MODE: NORMAL
MDC_IDC_SET_BRADY_PAV_DELAY_HIGH: 220 MS
MDC_IDC_SET_BRADY_PAV_DELAY_LOW: 250 MS
MDC_IDC_SET_BRADY_SAV_DELAY_HIGH: 220 MS
MDC_IDC_SET_BRADY_SAV_DELAY_LOW: 250 MS
MDC_IDC_SET_LEADCHNL_RA_PACING_AMPLITUDE: 2 V
MDC_IDC_SET_LEADCHNL_RA_PACING_POLARITY: NORMAL
MDC_IDC_SET_LEADCHNL_RA_PACING_PULSEWIDTH: 0.4 MS
MDC_IDC_SET_LEADCHNL_RA_SENSING_ADAPTATION_MODE: NORMAL
MDC_IDC_SET_LEADCHNL_RA_SENSING_POLARITY: NORMAL
MDC_IDC_SET_LEADCHNL_RA_SENSING_SENSITIVITY: 0.5 MV
MDC_IDC_SET_LEADCHNL_RV_PACING_AMPLITUDE: 2.2 V
MDC_IDC_SET_LEADCHNL_RV_PACING_CAPTURE_MODE: NORMAL
MDC_IDC_SET_LEADCHNL_RV_PACING_POLARITY: NORMAL
MDC_IDC_SET_LEADCHNL_RV_PACING_PULSEWIDTH: 0.4 MS
MDC_IDC_SET_LEADCHNL_RV_SENSING_ADAPTATION_MODE: NORMAL
MDC_IDC_SET_LEADCHNL_RV_SENSING_POLARITY: NORMAL
MDC_IDC_SET_LEADCHNL_RV_SENSING_SENSITIVITY: 1 MV
MDC_IDC_SET_ZONE_DETECTION_INTERVAL: 375 MS
MDC_IDC_SET_ZONE_TYPE: NORMAL
MDC_IDC_SET_ZONE_VENDOR_TYPE: NORMAL
MDC_IDC_STAT_AT_BURDEN_PERCENT: 1 %
MDC_IDC_STAT_AT_DTM_END: NORMAL
MDC_IDC_STAT_AT_DTM_START: NORMAL
MDC_IDC_STAT_BRADY_DTM_END: NORMAL
MDC_IDC_STAT_BRADY_DTM_START: NORMAL
MDC_IDC_STAT_BRADY_RA_PERCENT_PACED: 13 %
MDC_IDC_STAT_BRADY_RV_PERCENT_PACED: 0 %
MDC_IDC_STAT_EPISODE_RECENT_COUNT: 0
MDC_IDC_STAT_EPISODE_RECENT_COUNT: 0
MDC_IDC_STAT_EPISODE_RECENT_COUNT: 2
MDC_IDC_STAT_EPISODE_RECENT_COUNT: 3
MDC_IDC_STAT_EPISODE_RECENT_COUNT_DTM_END: NORMAL
MDC_IDC_STAT_EPISODE_RECENT_COUNT_DTM_START: NORMAL
MDC_IDC_STAT_EPISODE_TYPE: NORMAL
MDC_IDC_STAT_EPISODE_VENDOR_TYPE: NORMAL

## 2020-12-03 ENCOUNTER — TELEPHONE (OUTPATIENT)
Dept: FAMILY MEDICINE | Facility: CLINIC | Age: 80
End: 2020-12-03

## 2020-12-03 DIAGNOSIS — I50.32 CHRONIC DIASTOLIC HEART FAILURE (H): ICD-10-CM

## 2020-12-09 DIAGNOSIS — I50.32 CHRONIC DIASTOLIC HEART FAILURE (H): ICD-10-CM

## 2020-12-11 RX ORDER — POTASSIUM CHLORIDE 750 MG/1
30 TABLET, EXTENDED RELEASE ORAL 2 TIMES DAILY
Qty: 360 TABLET | Refills: 3 | Status: SHIPPED | OUTPATIENT
Start: 2020-12-11 | End: 2020-12-14

## 2020-12-14 ENCOUNTER — NURSE TRIAGE (OUTPATIENT)
Dept: FAMILY MEDICINE | Facility: CLINIC | Age: 80
End: 2020-12-14

## 2020-12-14 ENCOUNTER — OFFICE VISIT (OUTPATIENT)
Dept: URGENT CARE | Facility: URGENT CARE | Age: 80
End: 2020-12-14
Payer: MEDICARE

## 2020-12-14 VITALS
DIASTOLIC BLOOD PRESSURE: 70 MMHG | WEIGHT: 282 LBS | BODY MASS INDEX: 38.25 KG/M2 | SYSTOLIC BLOOD PRESSURE: 126 MMHG | TEMPERATURE: 98.5 F | HEART RATE: 72 BPM | OXYGEN SATURATION: 95 %

## 2020-12-14 DIAGNOSIS — I26.99 PE (PULMONARY THROMBOEMBOLISM) (H): ICD-10-CM

## 2020-12-14 DIAGNOSIS — N30.00 ACUTE CYSTITIS WITHOUT HEMATURIA: Primary | ICD-10-CM

## 2020-12-14 DIAGNOSIS — N18.32 STAGE 3B CHRONIC KIDNEY DISEASE (H): ICD-10-CM

## 2020-12-14 DIAGNOSIS — R82.90 NONSPECIFIC FINDING ON EXAMINATION OF URINE: ICD-10-CM

## 2020-12-14 LAB
ALBUMIN UR-MCNC: 30 MG/DL
APPEARANCE UR: CLEAR
BACTERIA #/AREA URNS HPF: ABNORMAL /HPF
BILIRUB UR QL STRIP: NEGATIVE
COLOR UR AUTO: YELLOW
GLUCOSE UR STRIP-MCNC: NEGATIVE MG/DL
HGB UR QL STRIP: ABNORMAL
INR PPP: 3.4 (ref 0.86–1.14)
KETONES UR STRIP-MCNC: NEGATIVE MG/DL
LEUKOCYTE ESTERASE UR QL STRIP: ABNORMAL
NITRATE UR QL: NEGATIVE
PH UR STRIP: 6 PH (ref 5–7)
RBC #/AREA URNS AUTO: ABNORMAL /HPF
SOURCE: ABNORMAL
SP GR UR STRIP: 1.01 (ref 1–1.03)
SPERM #/AREA URNS HPF: PRESENT /HPF
UROBILINOGEN UR STRIP-ACNC: 0.2 EU/DL (ref 0.2–1)
WBC #/AREA URNS AUTO: ABNORMAL /HPF

## 2020-12-14 PROCEDURE — 36416 COLLJ CAPILLARY BLOOD SPEC: CPT | Performed by: INTERNAL MEDICINE

## 2020-12-14 PROCEDURE — 85610 PROTHROMBIN TIME: CPT | Performed by: INTERNAL MEDICINE

## 2020-12-14 PROCEDURE — 81001 URINALYSIS AUTO W/SCOPE: CPT | Performed by: PHYSICIAN ASSISTANT

## 2020-12-14 PROCEDURE — 99214 OFFICE O/P EST MOD 30 MIN: CPT | Performed by: PHYSICIAN ASSISTANT

## 2020-12-14 PROCEDURE — 87086 URINE CULTURE/COLONY COUNT: CPT | Performed by: PHYSICIAN ASSISTANT

## 2020-12-14 RX ORDER — CIPROFLOXACIN 500 MG/1
500 TABLET, FILM COATED ORAL 2 TIMES DAILY
Qty: 28 TABLET | Refills: 0 | Status: SHIPPED | OUTPATIENT
Start: 2020-12-14 | End: 2020-12-28

## 2020-12-14 NOTE — TELEPHONE ENCOUNTER
Reason for call:  Patient reporting a symptom    Symptom or request:  Burning when urinating and frequency     Duration (how long have symptoms been present):  3-4 days    Have you been treated for this before? Yes    Additional comments:  Please triage pt regarding these sx.     Phone Number patient can be reached at:  Home number on file [phone] (home)    Best Time:  any    Can we leave a detailed message on this number:  YES    Call taken on 12/14/2020 at 1:35 PM by Colt Owusu

## 2020-12-14 NOTE — TELEPHONE ENCOUNTER
Pt complains of dysuria, urinary frequesncy and back pain for 3-4 days. Denies fever, hematuria or abdominal pain. Claudette is keeping him from doing his normal activities. Advised he be seen at  today. He verbalized agreement with plan.     Additional Information    Negative: Shock suspected (e.g., cold/pale/clammy skin, too weak to stand, low BP, rapid pulse)    Negative: Sounds like a life-threatening emergency to the triager    Negative: Unable to urinate (or only a few drops) and bladder feels very full    Negative: Swollen scrotum    Negative: Pain in scrotum or testicle that persists > 1 hour    Negative: Fever > 100.5 F (38.1 C)    Negative: Vomiting    Negative: Patient sounds very sick or weak to the triager    Side (flank) or lower back pain present    Negative: Severe pain with urination    Protocols used: URINATION PAIN - MALE-A-OH

## 2020-12-14 NOTE — TELEPHONE ENCOUNTER
Pt is coming into [facility] for possible UTI.  Talked with them.  It will be after 6 PM.They will do a INR and if elevated call on call

## 2020-12-14 NOTE — PROGRESS NOTES
Subjective     Erich Craven is a 80 year old male who presents to clinic today for the following health issues:    HPI         Genitourinary - Male  Onset/Duration: 4-5 days  Description:   Dysuria (painful urination): YES  Hematuria (blood in urine): no  Frequency: no (always frequent not different)  Waking at night to urinate: YES- chronic  Hesitancy (delay in urine): no  Retention (unable to empty): no  Decrease in urinary flow: YES- perhaps a little now  Incontinence: no  Progression of Symptoms:  same  Accompanying Signs & Symptoms:  Fever: no  Back/Flank pain: YES- center  Urethral discharge: no  Testicle lumps/masses/pain: no  Nausea and/or vomiting: no  Abdominal pain: YES- suprapubic  History:   History of frequent UTI s: no  History of kidney stones: no  History of hernias: no    He notes that he has had a mix of hard and soft bowel movements for the past few days.       Review of Systems   GENERAL:  No fevers  GI:  As noted in HPI      Objective    /70   Pulse 72   Temp 98.5  F (36.9  C) (Tympanic)   Wt 127.9 kg (282 lb)   SpO2 95%   BMI 38.25 kg/m    Body mass index is 38.25 kg/m .  Physical Exam   GENERAL: No acute distress  HEENT: Normocephalic  CARDIAC: Regular rate and rhythm. No murmurs.  PULMONARY: Lungs are clear to auscultation bilaterally. No wheezes, rhonchi or crackles.  GI: Active bowel sounds, abdomen is soft with mild tenderness in the suprapubic area.  : No CVA tenderness bilaterally.   NEURO: Alert and non-focal      Results for orders placed or performed in visit on 12/14/20 (from the past 24 hour(s))   UA reflex to Microscopic and Culture    Specimen: Midstream Urine   Result Value Ref Range    Color Urine Yellow     Appearance Urine Clear     Glucose Urine Negative NEG^Negative mg/dL    Bilirubin Urine Negative NEG^Negative    Ketones Urine Negative NEG^Negative mg/dL    Specific Gravity Urine 1.015 1.003 - 1.035    Blood Urine Trace (A) NEG^Negative    pH Urine 6.0 5.0  - 7.0 pH    Protein Albumin Urine 30 (A) NEG^Negative mg/dL    Urobilinogen Urine 0.2 0.2 - 1.0 EU/dL    Nitrite Urine Negative NEG^Negative    Leukocyte Esterase Urine Small (A) NEG^Negative    Source Midstream Urine    Urine Microscopic   Result Value Ref Range    WBC Urine 25-50 (A) OTO5^0 - 5 /HPF    RBC Urine 2-5 (A) OTO2^O - 2 /HPF    Bacteria Urine Many (A) NEG^Negative /HPF    sperm Present (A) NEG^Negative /HPF   INR   Result Value Ref Range    INR 3.40 (H) 0.86 - 1.14           Assessment & Plan     Acute cystitis without hematuria  Urine consistent with UTI. Treated with Cipro as noted below.  Cipro will affect his INR; message sent to Anticoag team.  Culture pending.  Close follow up if not improving.  - UA reflex to Microscopic and Culture  - Urine Microscopic  - ciprofloxacin (CIPRO) 500 MG tablet; Take 1 tablet (500 mg) by mouth 2 times daily for 14 days    Nonspecific finding on examination of urine  As noted above.  - Urine Culture Aerobic Bacterial  - ciprofloxacin (CIPRO) 500 MG tablet; Take 1 tablet (500 mg) by mouth 2 times daily for 14 days    Stage 3b chronic kidney disease  Creatinine Clearance is 57. Calculated due to patient's high creatinine.    PE (pulmonary thromboembolism) (H)  Patient will follow with Anticoag. Message sent to team due to starting Cipro.  - INR     Keara Lopez PA-C  [facility]

## 2020-12-14 NOTE — TELEPHONE ENCOUNTER
Pt is coming to [facility] today. He is hoping to have INR check while he is at clinic. Routing message to INR for lab order approval.

## 2020-12-15 ENCOUNTER — ANTICOAGULATION THERAPY VISIT (OUTPATIENT)
Dept: NURSING | Facility: CLINIC | Age: 80
End: 2020-12-15

## 2020-12-15 DIAGNOSIS — I82.4Z9 DEEP VEIN THROMBOSIS (DVT) OF DISTAL VEIN OF LOWER EXTREMITY, UNSPECIFIED CHRONICITY, UNSPECIFIED LATERALITY (H): ICD-10-CM

## 2020-12-15 DIAGNOSIS — I48.0 PAROXYSMAL A-FIB (H): ICD-10-CM

## 2020-12-15 DIAGNOSIS — I26.99 PE (PULMONARY THROMBOEMBOLISM) (H): ICD-10-CM

## 2020-12-15 NOTE — PROGRESS NOTES
ANTICOAGULATION MANAGEMENT     Patient Name:  Erich Craven  Date:  12/15/2020    ASSESSMENT /SUBJECTIVE:    Today's INR result of 3.4 is supratherapeutic. Goal INR of 2.0-3.0      Warfarin dose taken: Warfarin taken as instructed    Diet: No new diet changes affecting INR    Medication changes/ interactions: Potential interaction between Cipro  and warfarin which may affect subsequent INRs    Previous INR: Therapeutic     S/S of bleeding or thromboembolism: No    New injury or illness: Yes: Dx with UTI and started on Cipro x 14 days     Upcoming surgery, procedure or cardioversion: No    Additional findings: patient took his warfarin already today       PLAN:    Telephone call with Eirch regarding INR result and instructed:     Warfarin Dosing Instructions: 12/16 2.5mg  then continue your current warfarin dose of 7.5mg Wed and 5mg AOD    Instructed patient to follow up no later than: 3 days  Lab visit scheduled    Education provided: Target INR goal and significance of current INR result and Potential interaction between warfarin and Cipro      Erich verbalizes understanding and agrees to warfarin dosing plan.    Instructed to call the Anticoagulation Clinic for any changes, questions or concerns. (#[phone])        Evita Lloyd RN      OBJECTIVE:  Recent labs: (last 7 days)     12/14/20  1757   INR 3.40*         No question data found.  Anticoagulation Summary  As of 12/15/2020    INR goal:  2.0-3.0   TTR:  78.6 % (1 y)   INR used for dosing:  No new INR was available at the time of this encounter.   Warfarin maintenance plan:  7.5 mg (5 mg x 1.5) every Wed; 5 mg (5 mg x 1) all other days   Full warfarin instructions:  12/16: 2.5 mg; Otherwise 7.5 mg every Wed; 5 mg all other days   Weekly warfarin total:  37.5 mg   Plan last modified:  Delia Jang RN (1/15/2020)   Next INR check:  12/18/2020   Priority:  Maintenance   Target end date:  Indefinite    Indications    PE (pulmonary  thromboembolism) (H) [I26.99]  Paroxysmal A-fib (H) [I48.0]  Deep vein thrombosis (DVT) of distal vein of lower extremity  unspecified chronicity  unspecified laterality (H) [I82.4Z9]             Anticoagulation Episode Summary     INR check location:      Preferred lab:      Send INR reminders to:  SERENITY BECERRA    Comments:        Anticoagulation Care Providers     Provider Role Specialty Phone number    Dontrell Gómez MD Referring Internal Medicine [phone]

## 2020-12-16 LAB
BACTERIA SPEC CULT: NORMAL
SPECIMEN SOURCE: NORMAL

## 2020-12-17 ENCOUNTER — TELEPHONE (OUTPATIENT)
Dept: FAMILY MEDICINE | Facility: CLINIC | Age: 80
End: 2020-12-17

## 2020-12-17 NOTE — TELEPHONE ENCOUNTER
----- Message from Katelyn Stoner RN sent at 12/17/2020  1:57 PM CST -----  Regarding: Patient callback  Patient calling requesting that PCP office return call. He reports that he has labs for INR tomorrow and that his PCP has labs needed a well, he'd like them added to his lab draw appt tomorrow. (12/18).  He reports he has tried the main clinic phone number several times and is left waiting for 30 minutes without being able to talk to anyone.    Please followup with patient.  Thank you,  Katelyn Stoner, RN

## 2020-12-17 NOTE — TELEPHONE ENCOUNTER
Patient was seen in UC on 12/14 and was diagnosed with a urinary infection and was given an antibiotic.  He was told by the  doctor that he should f/u with some blood screening to see if something else was going on because his white blood cell count was high. He is asking if pcp would add some lab orders to his INR that he is coming in for tomorrow.  Please call patient back at [phone] with any questions.

## 2020-12-17 NOTE — TELEPHONE ENCOUNTER
TO PCP:     See below     Pt requesting lab orders to draw tomorrow     Noted per last lab notes he was to follow up with you in 1 month and recheck BMP at that time    Please advise - can BMP be added to lab draw, or do you prefer to do that when he follows up with you for an OV?     Keisha MARTIN RN

## 2020-12-18 ENCOUNTER — ANTICOAGULATION THERAPY VISIT (OUTPATIENT)
Dept: NURSING | Facility: CLINIC | Age: 80
End: 2020-12-18

## 2020-12-18 ENCOUNTER — DOCUMENTATION ONLY (OUTPATIENT)
Dept: FAMILY MEDICINE | Facility: CLINIC | Age: 80
End: 2020-12-18

## 2020-12-18 DIAGNOSIS — E66.01 MORBID OBESITY (H): Primary | ICD-10-CM

## 2020-12-18 DIAGNOSIS — I50.32 CHRONIC DIASTOLIC HEART FAILURE (H): ICD-10-CM

## 2020-12-18 DIAGNOSIS — I26.99 PE (PULMONARY THROMBOEMBOLISM) (H): ICD-10-CM

## 2020-12-18 DIAGNOSIS — R30.0 DYSURIA: ICD-10-CM

## 2020-12-18 LAB — INR PPP: 3.3 (ref 0.86–1.14)

## 2020-12-18 PROCEDURE — 36416 COLLJ CAPILLARY BLOOD SPEC: CPT | Performed by: INTERNAL MEDICINE

## 2020-12-18 PROCEDURE — 85610 PROTHROMBIN TIME: CPT | Performed by: INTERNAL MEDICINE

## 2020-12-18 PROCEDURE — 99207 PR NO CHARGE NURSE ONLY: CPT

## 2020-12-18 NOTE — PROGRESS NOTES
ANTICOAGULATION FOLLOW-UP CLINIC VISIT    Patient Name:  Erich Craven  Date:  12/18/2020  Contact Type:  Telephone/ Patient    SUBJECTIVE:  Patient Findings     Positives:  Change in health    Comments:  Pt dx with a UTI on 12/14/20 and started on cipro.Pt reports that urinary sxs are getting better but he feels fatigued. Pt plans to follow up with  on Monday 12/21/20.        Clinical Outcomes     Comments:  Pt dx with a UTI on 12/14/20 and started on cipro.Pt reports that urinary sxs are getting better but he feels fatigued. Pt plans to follow up with  on Monday 12/21/20.           OBJECTIVE    Recent labs: (last 7 days)     12/18/20  1342   INR 3.30*       ASSESSMENT / PLAN  INR assessment SUPRA    Recheck INR In: 3 DAYS    INR Location Outside lab      Anticoagulation Summary  As of 12/18/2020    INR goal:  2.0-3.0   TTR:  78.4 % (1 y)   INR used for dosing:  3.30 (12/18/2020)   Warfarin maintenance plan:  7.5 mg (5 mg x 1.5) every Wed; 5 mg (5 mg x 1) all other days   Full warfarin instructions:  12/18: 2.5 mg; Otherwise 7.5 mg every Wed; 5 mg all other days   Weekly warfarin total:  37.5 mg   Plan last modified:  Delia Jang RN (1/15/2020)   Next INR check:  12/21/2020   Priority:  Maintenance   Target end date:  Indefinite    Indications    PE (pulmonary thromboembolism) (H) [I26.99]  Paroxysmal A-fib (H) [I48.0]  Deep vein thrombosis (DVT) of distal vein of lower extremity  unspecified chronicity  unspecified laterality (H) [I82.4Z9]             Anticoagulation Episode Summary     INR check location:      Preferred lab:      Send INR reminders to:  SERENITY BECERRA    Comments:        Anticoagulation Care Providers     Provider Role Specialty Phone number    Dontrell Gmóez MD Referring Internal Medicine [phone]            See the Encounter Report to view Anticoagulation Flowsheet and Dosing Calendar (Go to Encounters tab in chart review, and find the Anticoagulation Therapy  Visit)    Pt INR is 3.3 today. See findings. Pt advised to take 2.5 mg today 12/18/20 and  5 mg of warfarin on Saturday and Sunday and recheck an INR on Monday 12/21/20 following his appointment with  that day. Jaun aware if signs of clotting (pain, tenderness, swelling, color change in leg or arm, SOB) and bleeding occur (blood in stool, urine, large bruising, bleeding gums, nosebleeds) to have INR check sooner. If sx severe report to ER or concerned for stroke call 911. If general questions or concerns arise, call clinic.         Delia Jang RN

## 2020-12-21 ENCOUNTER — OFFICE VISIT (OUTPATIENT)
Dept: FAMILY MEDICINE | Facility: CLINIC | Age: 80
End: 2020-12-21
Payer: MEDICARE

## 2020-12-21 VITALS
DIASTOLIC BLOOD PRESSURE: 71 MMHG | SYSTOLIC BLOOD PRESSURE: 118 MMHG | HEART RATE: 72 BPM | BODY MASS INDEX: 38.19 KG/M2 | HEIGHT: 72 IN | WEIGHT: 282 LBS | TEMPERATURE: 96.4 F | OXYGEN SATURATION: 95 %

## 2020-12-21 DIAGNOSIS — R19.7 DIARRHEA OF PRESUMED INFECTIOUS ORIGIN: ICD-10-CM

## 2020-12-21 DIAGNOSIS — R82.90 NONSPECIFIC FINDING ON EXAMINATION OF URINE: ICD-10-CM

## 2020-12-21 DIAGNOSIS — R30.0 DYSURIA: Primary | ICD-10-CM

## 2020-12-21 DIAGNOSIS — I48.0 PAROXYSMAL A-FIB (H): Primary | ICD-10-CM

## 2020-12-21 LAB
ALBUMIN UR-MCNC: NEGATIVE MG/DL
APPEARANCE UR: CLEAR
BACTERIA #/AREA URNS HPF: ABNORMAL /HPF
BILIRUB UR QL STRIP: NEGATIVE
COLOR UR AUTO: YELLOW
GLUCOSE UR STRIP-MCNC: NEGATIVE MG/DL
HGB UR QL STRIP: NEGATIVE
HYALINE CASTS #/AREA URNS LPF: ABNORMAL /LPF
KETONES UR STRIP-MCNC: NEGATIVE MG/DL
LEUKOCYTE ESTERASE UR QL STRIP: ABNORMAL
MUCOUS THREADS #/AREA URNS LPF: PRESENT /LPF
NITRATE UR QL: NEGATIVE
PH UR STRIP: 5 PH (ref 5–7)
RBC #/AREA URNS AUTO: ABNORMAL /HPF
SOURCE: ABNORMAL
SP GR UR STRIP: 1.01 (ref 1–1.03)
UROBILINOGEN UR STRIP-ACNC: 0.2 EU/DL (ref 0.2–1)
WBC #/AREA URNS AUTO: ABNORMAL /HPF

## 2020-12-21 PROCEDURE — 81001 URINALYSIS AUTO W/SCOPE: CPT | Performed by: INTERNAL MEDICINE

## 2020-12-21 PROCEDURE — 99213 OFFICE O/P EST LOW 20 MIN: CPT | Performed by: INTERNAL MEDICINE

## 2020-12-21 PROCEDURE — 87086 URINE CULTURE/COLONY COUNT: CPT | Performed by: INTERNAL MEDICINE

## 2020-12-21 ASSESSMENT — MIFFLIN-ST. JEOR: SCORE: 2022.14

## 2020-12-21 NOTE — PROGRESS NOTES
Subjective     Erich Craven is a 80 year old male who presents to clinic today for the following health issues:    HPI    Dysuria with UTI noted in urgent care.  Was recommended to be on antibiotics for 2 weeks.  At the 1 week ruperto his urinary symptoms have resolved and he is starting to have diarrhea for the last 24 hours.  He was seen by INR 1 week ago with a minor adjustment in his Coumadin dosing with repeat INR and recommended to repeat his INR today.     Lab results follow-up labs    AODM  Ophthalmology appointment on 11/15 with no noted changes.  Review of Systems   Constitutional, HEENT, cardiovascular, pulmonary, gi and gu systems are negative, except as otherwise noted.      Objective    /71 (BP Location: Left arm, Patient Position: Sitting)   Pulse 72   Temp 96.4  F (35.8  C) (Tympanic)   Ht 1.829 m (6')   Wt 127.9 kg (282 lb)   SpO2 95%   BMI 38.25 kg/m    Body mass index is 38.25 kg/m .  Physical Exam   GENERAL: healthy, alert and no distress  NECK: no adenopathy, no asymmetry, masses, or scars and thyroid normal to palpation  RESP: lungs clear to auscultation - no rales, rhonchi or wheezes  CV: regular rate and rhythm, normal S1 S2, no S3 or S4, no murmur, click or rub, no peripheral edema and peripheral pulses strong  ABDOMEN: soft, nontender, no hepatosplenomegaly, no masses and bowel sounds normal, no CVAT  MS: no gross musculoskeletal defects noted, 1-2+ pretibial peripheral edema            Assessment & Plan     Dysuria    - UA reflex to Microscopic and Culture  - Urine Microscopic    Diarrhea of presumed infectious origin  Recommended discontinuing his antibiotics and if his diarrhea has resolved we will recommend repeating his INR on his usual dosing schedule in 1 week.  If his diarrhea is not resolved he will follow-up with the C. difficile toxin to evaluate whether a course of Flagyl would be recommended.  If so he will require more intense follow-up of his INR.  - Clostridium  difficile Toxin B PCR; Future    Nonspecific finding on examination of urine    - Urine Culture Aerobic Bacterial     BMI:   Estimated body mass index is 38.25 kg/m  as calculated from the following:    Height as of this encounter: 1.829 m (6').    Weight as of this encounter: 127.9 kg (282 lb).                No follow-ups on file.  Follow-up appointment is dependent on his pending lab studies  Dontrell Gómez MD  [facility]

## 2020-12-22 ENCOUNTER — TELEPHONE (OUTPATIENT)
Dept: FAMILY MEDICINE | Facility: CLINIC | Age: 80
End: 2020-12-22

## 2020-12-22 LAB
BACTERIA SPEC CULT: NORMAL
Lab: NORMAL
SPECIMEN SOURCE: NORMAL

## 2020-12-22 NOTE — TELEPHONE ENCOUNTER
Reason for Call:  Other prescription    Detailed comments: patient states that Dr. Gómez was going to call him today to let him know if he needed to continue taking the medication.  Please call patient to advise.    Phone Number Patient can be reached at: Home number on file [phone] (home)    Best Time: any    Can we leave a detailed message on this number? YES    Call taken on 12/22/2020 at 5:39 PM by Rajeev Hanley

## 2020-12-23 NOTE — TELEPHONE ENCOUNTER
RN called back to pt.    Pt states when he was in on Monday, pt should discontinue some abx.   No longer having UTI sx. Culture negative.    Pt has been off abx for 2 days. Diarrhea has improved.  RN provided education on diarrhea management at home and reasons to call back as well.    Relayed info from OV notes.    PCP please confirm:  1) Pt does NOT have to re-start abx.  2) Pt does NOT need to bring stool sample in if diarrhea is improving.    NO need to call back if above is accurate.

## 2020-12-23 NOTE — TELEPHONE ENCOUNTER
Routing refill request to provider for review/approval because:  Labs out of range:  NELA NairN, RN  [employer] [facility]

## 2020-12-23 NOTE — TELEPHONE ENCOUNTER
Reason for Call:  Checking status of previous message    Detailed comments: Patient wants to know if he should continue antibiotic or change to another medication. Questions about giving a stool sample or not?    Phone Number Patient can be reached at: Home number on file [phone] (home)    Best Time: any    Can we leave a detailed message on this number? YES    Call taken on 12/23/2020 at 10:36 AM by Sissy Bain

## 2020-12-24 RX ORDER — SPIRONOLACTONE 25 MG/1
TABLET ORAL
Qty: 60 TABLET | Refills: 4 | Status: SHIPPED | OUTPATIENT
Start: 2020-12-24 | End: 2021-03-09

## 2021-01-04 ENCOUNTER — TELEPHONE (OUTPATIENT)
Dept: FAMILY MEDICINE | Facility: CLINIC | Age: 81
End: 2021-01-04

## 2021-01-04 NOTE — TELEPHONE ENCOUNTER
"Pt called. States he was told that he needed to contact his pcp in order to be \"moved up\" on covid vaccine list. I advised him that vaccines are not yet available to patients and that we have not heard anything as to when they will become available. Advised to check [email or web address] website for updates and to call back in a few weeks. Phyllis Nicholas RN  "

## 2021-01-06 DIAGNOSIS — F43.21 ADJUSTMENT DISORDER WITH DEPRESSED MOOD: ICD-10-CM

## 2021-01-06 NOTE — TELEPHONE ENCOUNTER
Controlled Substance Refill Request for Clonazepam  Problem List Complete:  Yes    Last Written Prescription Date:  10/20/2020  Last Fill Quantity: 60,   # refills: 2    THE MOST RECENT OFFICE VISIT MUST BE WITHIN THE PAST 3 MONTHS. AT LEAST ONE FACE TO FACE VISIT MUST OCCUR EVERY 6 MONTHS. ADDITIONAL VISITS CAN BE VIRTUAL.  (THIS STATEMENT SHOULD BE DELETED.)    Last Office Visit with [facility] primary care provider: 12/21/2020    Future Office visit:   Next 5 appointments (look out 90 days)    Feb 19, 2021  2:00 PM  Office Visit with Anne Heath MD  [facility] ([facility]) [address] [address]  [phone]          Controlled substance agreement:   Encounter-Level CSA:    There are no encounter-level csa.     Patient-Level CSA:    There are no patient-level csa.         Last Urine Drug Screen: No results found for: CDAUT, No results found for: COMDAT, No results found for: THC13, PCP13, COC13, MAMP13, OPI13, AMP13, BZO13, TCA13, MTD13, BAR13, OXY13, PPX13, BUP13     Processing:  Rx to be electronically transmitted to pharmacy by provider     [email or web address]   checked in past 3 months?  No, route to MARIA DE JESUS Suazo MA

## 2021-01-06 NOTE — TELEPHONE ENCOUNTER
Reason for Call:  Medication or medication refill:    Do you use a [facility]?  Name of the pharmacy and phone number for the current request:    [facility] [id number] [place] [facility], [address]    Name of the medication requested: clonazePAM (KLONOPIN) 0.5 MG tablet    Other request: Please refill Has an Est care with Dr Heath on 2/19/2021    Can we leave a detailed message on this number? YES    Phone number patient can be reached at: Home number on file [phone] (home)    Best Time: anytime    Call taken on 1/6/2021 at 10:01 AM by Xiomara Reeves

## 2021-01-08 RX ORDER — CLONAZEPAM 0.5 MG/1
TABLET ORAL
Qty: 60 TABLET | Refills: 2 | Status: SHIPPED | OUTPATIENT
Start: 2021-01-08 | End: 2021-04-09

## 2021-01-21 ENCOUNTER — TELEPHONE (OUTPATIENT)
Dept: FAMILY MEDICINE | Facility: CLINIC | Age: 81
End: 2021-01-21

## 2021-01-21 NOTE — LETTER
January 21, 2021      Erich Craven  [address]  [place] [address]      Dear Erich,    We are contacting you because our records show you were due for an INR on 12/21/20      There are potentially serious risks when taking warfarin without careful monitoring, and we want to make sure you are safely managed.    Please call the INR clinic at [phone] and we will be happy to help you schedule an appointment.  If there has been a change in your care, or other concerns, please let us know so we can help and/or update our records.    Sincerely,        Dontrell Gómez MD

## 2021-01-28 ENCOUNTER — IMMUNIZATION (OUTPATIENT)
Dept: NURSING | Facility: CLINIC | Age: 81
End: 2021-01-28
Payer: MEDICARE

## 2021-01-28 PROCEDURE — 0001A PR COVID VAC PFIZER DIL RECON 30 MCG/0.3 ML IM: CPT

## 2021-01-28 PROCEDURE — 91300 PR COVID VAC PFIZER DIL RECON 30 MCG/0.3 ML IM: CPT

## 2021-02-01 DIAGNOSIS — I10 ESSENTIAL HYPERTENSION: ICD-10-CM

## 2021-02-01 DIAGNOSIS — I50.32 CHRONIC DIASTOLIC HEART FAILURE (H): ICD-10-CM

## 2021-02-03 NOTE — TELEPHONE ENCOUNTER
Routing refill request to provider for review/approval because:  Labs out of range:    Creatinine   Date Value Ref Range Status   11/03/2020 1.83 (H) 0.66 - 1.25 mg/dL Final

## 2021-02-04 DIAGNOSIS — F41.9 ANXIETY: ICD-10-CM

## 2021-02-04 RX ORDER — LISINOPRIL 40 MG/1
40 TABLET ORAL DAILY
Qty: 90 TABLET | Refills: 3 | Status: SHIPPED | OUTPATIENT
Start: 2021-02-04 | End: 2021-11-02

## 2021-02-04 RX ORDER — METOLAZONE 2.5 MG/1
2.5 TABLET ORAL 2 TIMES DAILY
Qty: 180 TABLET | Refills: 3 | Status: SHIPPED | OUTPATIENT
Start: 2021-02-04 | End: 2022-05-09

## 2021-02-05 RX ORDER — VENLAFAXINE 37.5 MG/1
TABLET ORAL
Qty: 180 TABLET | Refills: 3 | Status: SHIPPED | OUTPATIENT
Start: 2021-02-05 | End: 2021-03-09

## 2021-02-06 ENCOUNTER — HEALTH MAINTENANCE LETTER (OUTPATIENT)
Age: 81
End: 2021-02-06

## 2021-02-18 ENCOUNTER — CARE COORDINATION (OUTPATIENT)
Dept: SLEEP MEDICINE | Facility: CLINIC | Age: 81
End: 2021-02-18

## 2021-02-18 ENCOUNTER — MEDICAL CORRESPONDENCE (OUTPATIENT)
Dept: HEALTH INFORMATION MANAGEMENT | Facility: CLINIC | Age: 81
End: 2021-02-18

## 2021-02-18 ENCOUNTER — IMMUNIZATION (OUTPATIENT)
Dept: NURSING | Facility: CLINIC | Age: 81
End: 2021-02-18
Attending: INTERNAL MEDICINE
Payer: MEDICARE

## 2021-02-18 PROCEDURE — 91300 PR COVID VAC PFIZER DIL RECON 30 MCG/0.3 ML IM: CPT

## 2021-02-18 PROCEDURE — 0002A PR COVID VAC PFIZER DIL RECON 30 MCG/0.3 ML IM: CPT

## 2021-02-19 ENCOUNTER — OFFICE VISIT (OUTPATIENT)
Dept: FAMILY MEDICINE | Facility: CLINIC | Age: 81
End: 2021-02-19
Payer: MEDICARE

## 2021-02-19 VITALS
OXYGEN SATURATION: 97 % | BODY MASS INDEX: 38.47 KG/M2 | TEMPERATURE: 96.4 F | WEIGHT: 284 LBS | DIASTOLIC BLOOD PRESSURE: 70 MMHG | HEIGHT: 72 IN | SYSTOLIC BLOOD PRESSURE: 114 MMHG | HEART RATE: 68 BPM

## 2021-02-19 DIAGNOSIS — I50.32 CHRONIC DIASTOLIC HEART FAILURE (H): ICD-10-CM

## 2021-02-19 DIAGNOSIS — R53.83 FATIGUE, UNSPECIFIED TYPE: ICD-10-CM

## 2021-02-19 DIAGNOSIS — E66.01 OBESITY, CLASS III, BMI 40-49.9 (MORBID OBESITY) (H): ICD-10-CM

## 2021-02-19 DIAGNOSIS — M25.511 CHRONIC PAIN OF BOTH SHOULDERS: ICD-10-CM

## 2021-02-19 DIAGNOSIS — E11.9 TYPE 2 DIABETES MELLITUS WITHOUT COMPLICATION, WITHOUT LONG-TERM CURRENT USE OF INSULIN (H): ICD-10-CM

## 2021-02-19 DIAGNOSIS — D53.9 MACROCYTIC ANEMIA: ICD-10-CM

## 2021-02-19 DIAGNOSIS — I26.99 PE (PULMONARY THROMBOEMBOLISM) (H): ICD-10-CM

## 2021-02-19 DIAGNOSIS — N18.32 STAGE 3B CHRONIC KIDNEY DISEASE (H): ICD-10-CM

## 2021-02-19 DIAGNOSIS — F41.9 ANXIETY: ICD-10-CM

## 2021-02-19 DIAGNOSIS — R06.02 SHORTNESS OF BREATH: Primary | ICD-10-CM

## 2021-02-19 DIAGNOSIS — G89.29 CHRONIC PAIN OF BOTH SHOULDERS: ICD-10-CM

## 2021-02-19 DIAGNOSIS — E78.49 OTHER HYPERLIPIDEMIA: ICD-10-CM

## 2021-02-19 DIAGNOSIS — Z78.9 MEDICALLY COMPLEX PATIENT: ICD-10-CM

## 2021-02-19 DIAGNOSIS — E03.9 HYPOTHYROIDISM, UNSPECIFIED TYPE: ICD-10-CM

## 2021-02-19 DIAGNOSIS — I89.0 LYMPHEDEMA: ICD-10-CM

## 2021-02-19 DIAGNOSIS — I48.0 PAROXYSMAL A-FIB (H): ICD-10-CM

## 2021-02-19 DIAGNOSIS — M25.512 CHRONIC PAIN OF BOTH SHOULDERS: ICD-10-CM

## 2021-02-19 DIAGNOSIS — I10 ESSENTIAL HYPERTENSION: ICD-10-CM

## 2021-02-19 DIAGNOSIS — J44.9 CHRONIC OBSTRUCTIVE PULMONARY DISEASE, UNSPECIFIED COPD TYPE (H): ICD-10-CM

## 2021-02-19 LAB
CAPILLARY BLOOD COLLECTION: NORMAL
ERYTHROCYTE [DISTWIDTH] IN BLOOD BY AUTOMATED COUNT: 15.3 % (ref 10–15)
HBA1C MFR BLD: 5.8 % (ref 0–5.6)
HCT VFR BLD AUTO: 38.8 % (ref 40–53)
HGB BLD-MCNC: 12 G/DL (ref 13.3–17.7)
MCH RBC QN AUTO: 31.7 PG (ref 26.5–33)
MCHC RBC AUTO-ENTMCNC: 30.9 G/DL (ref 31.5–36.5)
MCV RBC AUTO: 103 FL (ref 78–100)
NT-PROBNP SERPL-MCNC: 136 PG/ML (ref 0–450)
PLATELET # BLD AUTO: 200 10E9/L (ref 150–450)
RBC # BLD AUTO: 3.78 10E12/L (ref 4.4–5.9)
WBC # BLD AUTO: 10.6 10E9/L (ref 4–11)

## 2021-02-19 PROCEDURE — 82728 ASSAY OF FERRITIN: CPT | Performed by: INTERNAL MEDICINE

## 2021-02-19 PROCEDURE — 82043 UR ALBUMIN QUANTITATIVE: CPT | Performed by: INTERNAL MEDICINE

## 2021-02-19 PROCEDURE — 80053 COMPREHEN METABOLIC PANEL: CPT | Performed by: INTERNAL MEDICINE

## 2021-02-19 PROCEDURE — 99214 OFFICE O/P EST MOD 30 MIN: CPT | Performed by: INTERNAL MEDICINE

## 2021-02-19 PROCEDURE — 80061 LIPID PANEL: CPT | Performed by: INTERNAL MEDICINE

## 2021-02-19 PROCEDURE — 83036 HEMOGLOBIN GLYCOSYLATED A1C: CPT | Performed by: INTERNAL MEDICINE

## 2021-02-19 PROCEDURE — 85027 COMPLETE CBC AUTOMATED: CPT | Performed by: INTERNAL MEDICINE

## 2021-02-19 PROCEDURE — 82607 VITAMIN B-12: CPT | Performed by: INTERNAL MEDICINE

## 2021-02-19 PROCEDURE — 36415 COLL VENOUS BLD VENIPUNCTURE: CPT | Performed by: INTERNAL MEDICINE

## 2021-02-19 PROCEDURE — 85610 PROTHROMBIN TIME: CPT | Performed by: INTERNAL MEDICINE

## 2021-02-19 PROCEDURE — 83880 ASSAY OF NATRIURETIC PEPTIDE: CPT | Performed by: INTERNAL MEDICINE

## 2021-02-19 SDOH — HEALTH STABILITY: MENTAL HEALTH: HOW OFTEN DO YOU HAVE 6 OR MORE DRINKS ON ONE OCCASION?: NOT ASKED

## 2021-02-19 SDOH — HEALTH STABILITY: MENTAL HEALTH: HOW MANY STANDARD DRINKS CONTAINING ALCOHOL DO YOU HAVE ON A TYPICAL DAY?: NOT ASKED

## 2021-02-19 ASSESSMENT — MIFFLIN-ST. JEOR: SCORE: 2031.35

## 2021-02-19 NOTE — PROGRESS NOTES
"    Assessment & Plan   Problem List Items Addressed This Visit        Respiratory    Dyspnea - Primary    Relevant Orders    Comprehensive metabolic panel (BMP + Alb, Alk Phos, ALT, AST, Total. Bili, TP)    CBC with platelets    COPD (chronic obstructive pulmonary disease) (H)       Digestive    Obesity, Class III, BMI 40-49.9 (morbid obesity) (H)       Endocrine    Hypothyroidism    HLD (hyperlipidemia)    Relevant Orders    Lipid panel reflex to direct LDL Fasting    Diabetes mellitus, type 2 (H)    Relevant Orders    Hemoglobin A1c       Circulatory    HTN (hypertension)    Relevant Orders    Comprehensive metabolic panel (BMP + Alb, Alk Phos, ALT, AST, Total. Bili, TP)    Albumin Random Urine Quantitative with Creat Ratio    Chronic diastolic heart failure (H)       Urinary    Chronic kidney disease, stage 3    Relevant Orders    NEPHROLOGY ADULT REFERRAL    Albumin Random Urine Quantitative with Creat Ratio       Other    Fatigue    Lymphedema    Anxiety      Other Visit Diagnoses     Chronic pain of both shoulders        Relevant Orders    Orthopedic & Spine  Referral    Medically complex patient        Relevant Orders    MED THERAPY MANAGE REFERRAL            seen by cardiology in 5/202, cath did not show elevated pulm wedge or pulm pressures,   And continues to see Pace maker check ,   Though his lymphedema is not from right heart failure,   Has compression stockings, on 3 diuretic, has not been wearing them much  Follow up with cardiology,   He will need papers refilled for O2 at 1 littre    Uses Neb Tx prn sob    No change of meds today    Will repeat labs    UTD on COVID VACCINE X 2 SHOTS    FOLLOW UP WITH MTM             BMI:   Estimated body mass index is 38.51 kg/m  as calculated from the following:    Height as of this encounter: 1.829 m (6' 0.01\").    Weight as of this encounter: 128.8 kg (284 lb).       MEDICATIONS:  Continue current medications without change  CONSULTATION/REFERRAL to " "*NEPHROLOGY /  MTM  Work on weight loss  Regular exercise  See Patient Instructions    No follow-ups on file.    Anne Heath MD  [facility] [place] Zam[place]ano is a 80 year old who presents for the following health issues  HPI       New Patient/Transfer of Care  H/O PE, [place] 2012    On O2 occasional , he needs renewal , feeling SOB uses O2 , goes down to 89 or 89 % on RA, was told has COPD, uses inhaler once a while, if \"really SOB tries \"  And uses also Neb Tx and feels better, once a week .   If he goes walking with puppy, gets SOB, gets dyspnea walking and after, once week.  Uses 1 l once a week,   Triggers for SOB, also in past had allergies, but mainly   Has cardiologist he sees / gets Pacemaker,   Has pulm specialist -- has not seen , PFT used to get them, (sx's were worse in [facility])    On CPAP uses O2 condenser    Denies CP; seldom palpitations, \"feels extra\" do cause symptoms, short lasting    Has swelling in legs, has compression stockings; on 3 diuretic metolazone, furosemide, spirolactone,     He \"pees a lot\", void good stream, takes flomax    BP has been good , on lisinopril, Cardizem, coreg, isosorbide mononitrate, HR is maintained , once a while fatigued    On clonazepam, 0.5 mg twice a day as needed x 5 yrs. Denies any drowsy , or side effects , Dr Gómez gives him refills,. Has refills     His kidney function GFR dec from 8 till 11/ 20, GFR dec from 56 to 30   CBC looks good , has no anemia        Review of Systems   Constitutional, HEENT, cardiovascular, pulmonary, GI, , musculoskeletal, neuro, skin, endocrine and psych systems are negative, except as otherwise noted.      Objective    /70 (BP Location: Left arm, Patient Position: Sitting)   Pulse 68   Temp 96.4  F (35.8  C) (Temporal)   Ht 1.829 m (6' 0.01\")   Wt 128.8 kg (284 lb)   SpO2 97%   BMI 38.51 kg/m    Body mass index is 38.51 kg/m .  Physical Exam   GENERAL: healthy, alert and no distress  EYES: " Eyes grossly normal to inspection, PERRL and conjunctivae and sclerae normal  HENT: ear canals and TM's normal, nose and mouth without ulcers or lesions  NECK: no adenopathy, no asymmetry, masses, or scars and thyroid normal to palpation  RESP:min wheeze bilateral,  no rales, rhonchi \  CV: regular rate and rhythm, normal S1 S2, no S3 or S4, grade 2 systolic murmur LUSB , click or rub, +1 PITTING EDEMA  and peripheral pulses strong  ABDOMEN: soft, nontender, no hepatosplenomegaly, no masses and bowel sounds normal  MS: no gross musculoskeletal defects noted, no edema. Decrease FORM of trav shoulder lifting above horizontal plane,   SKIN: no suspicious lesions or rashes, scaly skin dry on trav shin   NEURO: Normal strength and tone, mentation intact and speech normal  PSYCH: mentation appears normal, affect normal/bright  Diab foot exam intact sensation and pulses +2 distally, no deformities    Office Visit on 12/21/2020   Component Date Value Ref Range Status     Color Urine 12/21/2020 Yellow   Final     Appearance Urine 12/21/2020 Clear   Final     Glucose Urine 12/21/2020 Negative  NEG^Negative mg/dL Final     Bilirubin Urine 12/21/2020 Negative  NEG^Negative Final     Ketones Urine 12/21/2020 Negative  NEG^Negative mg/dL Final     Specific Gravity Urine 12/21/2020 1.015  1.003 - 1.035 Final     Blood Urine 12/21/2020 Negative  NEG^Negative Final     pH Urine 12/21/2020 5.0  5.0 - 7.0 pH Final     Protein Albumin Urine 12/21/2020 Negative  NEG^Negative mg/dL Final     Urobilinogen Urine 12/21/2020 0.2  0.2 - 1.0 EU/dL Final     Nitrite Urine 12/21/2020 Negative  NEG^Negative Final     Leukocyte Esterase Urine 12/21/2020 Small* NEG^Negative Final     Source 12/21/2020 Midstream Urine   Final     WBC Urine 12/21/2020 10-25* OTO5^0 - 5 /HPF Final     RBC Urine 12/21/2020 O - 2  OTO2^O - 2 /HPF Final     Hyaline Casts 12/21/2020 2-5* OTO2^O - 2 /LPF Final     Bacteria Urine 12/21/2020 Few* NEG^Negative /HPF Final      Mucous Urine 12/21/2020 Present* NEG^Negative /LPF Final     Specimen Description 12/21/2020 Midstream Urine   Final     Special Requests 12/21/2020 Specimen received in preservative   Final     Culture Micro 12/21/2020    Final                    Value:<10,000 colonies/mL  urogenital ava  Susceptibility testing not routinely done

## 2021-02-20 DIAGNOSIS — N18.32 STAGE 3B CHRONIC KIDNEY DISEASE (H): ICD-10-CM

## 2021-02-20 DIAGNOSIS — E87.5 HYPERKALEMIA: Primary | ICD-10-CM

## 2021-02-20 LAB
ALBUMIN SERPL-MCNC: 3.9 G/DL (ref 3.4–5)
ALP SERPL-CCNC: 107 U/L (ref 40–150)
ALT SERPL W P-5'-P-CCNC: 26 U/L (ref 0–70)
ANION GAP SERPL CALCULATED.3IONS-SCNC: 6 MMOL/L (ref 3–14)
AST SERPL W P-5'-P-CCNC: 14 U/L (ref 0–45)
BILIRUB SERPL-MCNC: 0.4 MG/DL (ref 0.2–1.3)
BUN SERPL-MCNC: 53 MG/DL (ref 7–30)
CALCIUM SERPL-MCNC: 9.5 MG/DL (ref 8.5–10.1)
CHLORIDE SERPL-SCNC: 106 MMOL/L (ref 94–109)
CHOLEST SERPL-MCNC: 138 MG/DL
CO2 SERPL-SCNC: 26 MMOL/L (ref 20–32)
CREAT SERPL-MCNC: 1.34 MG/DL (ref 0.66–1.25)
CREAT UR-MCNC: 55 MG/DL
FERRITIN SERPL-MCNC: 97 NG/ML (ref 26–388)
GFR SERPL CREATININE-BSD FRML MDRD: 49 ML/MIN/{1.73_M2}
GLUCOSE SERPL-MCNC: 82 MG/DL (ref 70–99)
HDLC SERPL-MCNC: 44 MG/DL
LDLC SERPL CALC-MCNC: 52 MG/DL
MICROALBUMIN UR-MCNC: 15 MG/L
MICROALBUMIN/CREAT UR: 27.79 MG/G CR (ref 0–17)
NONHDLC SERPL-MCNC: 94 MG/DL
POTASSIUM SERPL-SCNC: 5.4 MMOL/L (ref 3.4–5.3)
PROT SERPL-MCNC: 7.8 G/DL (ref 6.8–8.8)
SODIUM SERPL-SCNC: 138 MMOL/L (ref 133–144)
TRIGL SERPL-MCNC: 209 MG/DL

## 2021-02-21 LAB — VIT B12 SERPL-MCNC: 1846 PG/ML (ref 193–986)

## 2021-02-21 NOTE — RESULT ENCOUNTER NOTE
Please call patient with below recommendations    Jaun, reviewed your labs, resulted    Cholesterol panel shows numbers are stable at goal, triglyceride slightly elevated.  Continue with lifestyle changes low-fat low-cholesterol diet and atorvastatin medication.    Your chemistry shows elevated potassium at 5.4, I recommend that you stop spironolactone pill that you take every other day, as such can increase potassium when combined with lisinopril.    Your kidney function test has improved GFR which is filtration rate is increased to 49 from 34, creatinine dropped down to 1.34 from 1.83.  Please keep well-hydrated increase fluid water intake.  Sodium level is normal  Calcium level is normal  Total protein albumin is normal, liver enzymes are normal,  Urine microalbumin slightly evaded 27.79 which is slight spilling protein in the urine which we will monitor.    I would recommend you repeat your potassium level in next 1-2 week off spironolactone.  [Please stop taking spironolactone]    Please schedule follow-up with cardiology, please call to schedule your repeat potassium in the next 2 weeks.  Any further questions please let me know  Dr. Heath

## 2021-02-22 ENCOUNTER — DOCUMENTATION ONLY (OUTPATIENT)
Dept: FAMILY MEDICINE | Facility: CLINIC | Age: 81
End: 2021-02-22

## 2021-02-22 ENCOUNTER — ANTICOAGULATION THERAPY VISIT (OUTPATIENT)
Dept: NURSING | Facility: CLINIC | Age: 81
End: 2021-02-22

## 2021-02-22 DIAGNOSIS — I26.99 PE (PULMONARY THROMBOEMBOLISM) (H): ICD-10-CM

## 2021-02-22 DIAGNOSIS — I82.4Z9 DEEP VEIN THROMBOSIS (DVT) OF DISTAL VEIN OF LOWER EXTREMITY, UNSPECIFIED CHRONICITY, UNSPECIFIED LATERALITY (H): Primary | ICD-10-CM

## 2021-02-22 DIAGNOSIS — I48.0 PAROXYSMAL A-FIB (H): ICD-10-CM

## 2021-02-22 DIAGNOSIS — I82.4Z9 DEEP VEIN THROMBOSIS (DVT) OF DISTAL VEIN OF LOWER EXTREMITY, UNSPECIFIED CHRONICITY, UNSPECIFIED LATERALITY (H): ICD-10-CM

## 2021-02-22 LAB — INR PPP: 2.9 (ref 0.86–1.14)

## 2021-02-22 RX ORDER — SPIRONOLACTONE 25 MG/1
TABLET ORAL
Qty: 30 TABLET | Refills: 9 | OUTPATIENT
Start: 2021-02-22

## 2021-02-22 NOTE — PROGRESS NOTES
ANTICOAGULATION MANAGEMENT      Erich Craven due for annual renewal of referral to anticoagulation monitoring. Order pended for your review and signature.      ANTICOAGULATION SUMMARY      Warfarin indication(s)     Atrial fibrillation  DVT  PE    Heart valve present?  NO       Current goal range   INR: 2.0-3.0     Goal appropriate for indication? Yes, INR 2-3 appropriate for hx of DVT, PE, hypercoagulable state, Afib, LVAD, or bileaflet AVR without risk factors     Current duration of therapy Indefinite/long term therapy   Time in Therapeutic Range (TTR)  (Goal > 60%) 78.4 %       Office visit with referring provider's group within last year yes on 2/19/21       Evita Lloyd RN

## 2021-02-22 NOTE — TELEPHONE ENCOUNTER
RX refused.    Rx sent 12/24/2020 with 4 refills.  Still available   Pharmacy notified.   Lanny GARNICA RN,BSN

## 2021-02-22 NOTE — PROGRESS NOTES
ANTICOAGULATION MANAGEMENT     Patient Name:  Erich Craven  Date:  2/22/2021    ASSESSMENT /SUBJECTIVE:    Today's INR result of 2.9 is therapeutic. Goal INR of 2.0-3.0      Warfarin dose taken: Warfarin taken as instructed    Diet: No new diet changes affecting INR    Medication changes/ interactions: No new medications/supplements affecting INR    Previous INR: Therapeutic     S/S of bleeding or thromboembolism: No    New injury or illness: No    Upcoming surgery, procedure or cardioversion: No    Additional findings: None      PLAN:    Telephone call with Erich regarding INR result and instructed:     Warfarin Dosing Instructions: Continue your current warfarin dose 7.5mg Fri and 5mg AOD    Instructed patient to follow up no later than: 8weeks  Lab visit scheduled    Education provided: None required      Jaun verbalizes understanding and agrees to warfarin dosing plan.    Instructed to call the Anticoagulation Clinic for any changes, questions or concerns. (#[phone])        Evita Lloyd RN      OBJECTIVE:  Recent labs: (last 7 days)     02/19/21  1531   INR 2.90*         No question data found.  Anticoagulation Summary  As of 2/22/2021    INR goal:  2.0-3.0   TTR:  74.6 % (1 y)   INR used for dosing:  No new INR was available at the time of this encounter.   Warfarin maintenance plan:  7.5 mg (5 mg x 1.5) every Fri; 5 mg (5 mg x 1) all other days   Full warfarin instructions:  7.5 mg every Fri; 5 mg all other days   Weekly warfarin total:  37.5 mg   Plan last modified:  Evita Lloyd RN (2/22/2021)   Next INR check:  4/19/2021   Priority:  Maintenance   Target end date:  Indefinite    Indications    PE (pulmonary thromboembolism) (H) [I26.99]  Paroxysmal A-fib (H) [I48.0]  Deep vein thrombosis (DVT) of distal vein of lower extremity  unspecified chronicity  unspecified laterality (H) [I82.4Z9]             Anticoagulation Episode Summary     INR check location:      Preferred lab:      Send  INR reminders to:  SERENITY BECERRA    Comments:        Anticoagulation Care Providers     Provider Role Specialty Phone number    Dontrell Gómez MD Referring Internal Medicine [phone]

## 2021-02-23 ENCOUNTER — OFFICE VISIT (OUTPATIENT)
Dept: CARDIOLOGY | Facility: CLINIC | Age: 81
End: 2021-02-23
Attending: INTERNAL MEDICINE
Payer: MEDICARE

## 2021-02-23 VITALS
DIASTOLIC BLOOD PRESSURE: 74 MMHG | WEIGHT: 288 LBS | BODY MASS INDEX: 39.05 KG/M2 | SYSTOLIC BLOOD PRESSURE: 123 MMHG | HEART RATE: 75 BPM

## 2021-02-23 DIAGNOSIS — I49.5 SICK SINUS SYNDROME (H): ICD-10-CM

## 2021-02-23 DIAGNOSIS — I50.30 HEART FAILURE WITH PRESERVED EJECTION FRACTION, UNSPECIFIED HF CHRONICITY (H): ICD-10-CM

## 2021-02-23 DIAGNOSIS — Z95.0 CARDIAC PACEMAKER IN SITU: ICD-10-CM

## 2021-02-23 DIAGNOSIS — E78.49 OTHER HYPERLIPIDEMIA: ICD-10-CM

## 2021-02-23 DIAGNOSIS — I10 ESSENTIAL HYPERTENSION: ICD-10-CM

## 2021-02-23 DIAGNOSIS — I48.0 PAROXYSMAL ATRIAL FIBRILLATION (H): ICD-10-CM

## 2021-02-23 DIAGNOSIS — E66.01 MORBID OBESITY (H): ICD-10-CM

## 2021-02-23 DIAGNOSIS — G47.33 OSA (OBSTRUCTIVE SLEEP APNEA): ICD-10-CM

## 2021-02-23 DIAGNOSIS — I25.10 CORONARY ARTERY DISEASE INVOLVING NATIVE CORONARY ARTERY OF NATIVE HEART WITHOUT ANGINA PECTORIS: ICD-10-CM

## 2021-02-23 PROCEDURE — 99214 OFFICE O/P EST MOD 30 MIN: CPT | Performed by: NURSE PRACTITIONER

## 2021-02-23 NOTE — LETTER
2/23/2021    Anne Heath MD  [address]  [facility] [address]    RE: Erich Craven       Dear Colleague,    I had the pleasure of seeing Erich Craven in the [facility].    Cardiology Clinic Progress Note  Erich Craven MRN# [id number]   YOB: 1940 Age: 80 year old   Primary Cardiologist: Dr. Portillo  Reason for visit: Cardiology follow up             Assessment and Plan:   Erich Craven is a very pleasant 80 year old male with a history of coronary artery disease, paroxsymal atrial fibrillation, s/p PPM for conduction system disease, hypertension, hyperlipidemia, RIN and morbid obesity.     I saw patient today for cardiology follow up. He is doing well from a cardiovascular standpoint. No angina. Continued chronic LE edema which he states is stable and weight has remained stable. Labs showed stable kidney function. Potassium was elevated at 5.4 and PCP stopped his spironolactone and has follow up labs ordered for 1-2 weeks. No medication changes today and will have him follow up with Dr. Portillo in 6 months as requested. Encouraged to call with any questions/concerns.       1. Coronary artery disease with history of stenting of his proximal LAD and mid-proximal RCA in 2014. Stable, no signs/symptoms of ischemia.               - Continue aspirin, statin and betablocker therapy              - Counseled patient on lifestyle modifications     2. Near normal pulmonary pressures and normal wedge on RHC 11/5/19 RA 13mmHg, PA 27mmHg, PCWP 13mmHg and cardiac output 2.81. Unknown weight at time of RHC.      3. Chronic lower extremity edema - not secondary to heart failure. Right heart catheterization completed 11/5/2019 showed near normal pulmonary pressures and normal wedge pressure. Reports his weight has been stable.   - Continue current diuretic regimen (furosemide and metolazone)              - Reviewed low sodium diet  and 2L fluid restriction     4. Paroxsymal atrial fibrillation - stable, rate controlled, asymptomatic. Device interrogation from November showed 2 mode switches, compromising < 1% of the time.               - Continue diltiazem and carvedilol      5. S/p PPM for conduction system disease - stable, continue routine device checks.      6. Hypertension - controlled. His spironolactone was recently stopped by PCP blood pressure today controlled off spironolactone.               - Continue carvedilol, diltiazem, isosorbide mononitrate, lisinopril and diuretics (furosemide and metolazone)              - Counseled patient on lifestyle modifications to help manage BP     7. Hyperlipidemia - lipid panel 2/19/21 demonstrated total cholesterol 138, HDL 44, LDL 52 and triglycerides 209              - continue atorvastatin     8. Obstructive sleep apnea - complaint with CPAP for past month, counseled patient on the importance of compliance with CPAP.      9. Morbid obesity -counseled patient on weight loss strategies, reviewed techniques to help manage portion size.     Changes today: none    Follow up plan:     Follow up with Dr. Keene in 6 months with BMP prior as already requested.         History of Presenting Illness:    Erich Craven is a very pleasant 80 year old male with a history of coronary artery disease, paroxsymal atrial fibrillation, s/p PPM for conduction system disease, hypertension, hyperlipidemia, RIN and morbid obesity.      Patient moved from [facility] this past year to be closer to his family. He established cardiology care with Dr. Keene. Patient has history of stenting of his proximal LAD and mid-proximal RCA in 2014. Echocardiogram completed in February 2019 at [place] showed borderline LV enlargement, EF 66%, no regional wall motion abnormalities, grade I diastolic dysfunction, RV normal size/function and normal IVC. During patients initial visit with Dr. Umer Dixon in April 2019  a NTproBNP was checked due to conflicting physical exam findings and objective findings on echocardiogram, NTproBNP was normal. During September visit NTproBNP was again repeat and again normal at 71. There was question if lower extremity edema was from other conditions than congestive heart failure. Dr. Keene recommended right heart catheterization to confirm heart failure.      Right heart catheterization 11/5/2019 demonstrated mildly elevated right sided filling pressures, normal PA pressures, normal left sided filling pressures and normal cardiac output (RA 13mmHg, PA 27mmHg, PCWP 13mmHg and cardiac output 2.81). With mild sedation and pt wide awake systemic hypoxia (O2 sat down  To 86-87%) was noted. Correlation with sleep study recommended. Dr. Keene reviewed right heart catheterization results, noting that patients shortness of breath and swelling were not secondary to heart failure, recommended sleep study.     Jaun jauregui saw Dr. Keene in May 2020 at which time he was doing well from a cardiovascular standpoint. No medications were changed.     His most recent device interrogation 11/2020 showed 2 mode switches compromising < 1% of the time. 3 ventricular high rates, EGMs show SVT lasting 2-3 seconds, rates 150-160bpm. He reports no symptoms today.     On labs completed by PCP last week potassium was 5.4, spironolactone was stopped. PCP has ordered a recheck in 1-2 weeks.     Patient is here today for cardiology follow up.     Patient reports feeling good. Monitoring weights daily a home. States ranging in the 280#s at home. Which he states is stable. Denies chest pain or chest tightness. Denies shortness of breath at rest. Exertional dyspnea with more strenuous activity, no changes. Able to complete ADLs independently. Sleeping good. Using CPAP nightly. Denies orthopnea or PND. Denies dizziness, lightheadedness or other presyncopal symptoms. Denies tachycardia or palpitations.     Labs  from 2/19 showed overall stable kidney function, creatinine 1.34, BUN 53. Potassium was elevated at 5.4 (PCP stopped spironolactone), hemoglobin 12, NTproBNP 136. Lipid panel showed total cholesterol 138, HDL 44, LDL 52 and triglycerides 209. Blood pressure 123/74 and HR 75 in clinic today.    Appetite good. Eating meals at home. Eating salads/fruits, vegetarian. Has a puppy which has kept him active. Denies alcohol use. Denies tobacco use.         Recent Hospitalizations   None        Social History    Living independently in an apartment. 5 children. Moved back to [place] to be near children (3 live here) 8 grandchildren. Enjoys writing and astrology.    Social History     Socioeconomic History     Marital status: Single     Spouse name: Not on file     Number of children: Not on file     Years of education: Not on file     Highest education level: Not on file   Occupational History     Not on file   Social Needs     Financial resource strain: Not on file     Food insecurity     Worry: Not on file     Inability: Not on file     Transportation needs     Medical: Not on file     Non-medical: Not on file   Tobacco Use     Smoking status: Former Smoker     Packs/day: 0.50     Years: 25.00     Pack years: 12.50     Smokeless tobacco: Never Used     Tobacco comment: quit in 1989   Substance and Sexual Activity     Alcohol use: No     Frequency: Never     Comment: quit in 1989; recovering     Drug use: No     Sexual activity: Not Currently     Partners: Female   Lifestyle     Physical activity     Days per week: Not on file     Minutes per session: Not on file     Stress: Not on file   Relationships     Social connections     Talks on phone: Not on file     Gets together: Not on file     Attends Zoroastrian service: Not on file     Active member of club or organization: Not on file     Attends meetings of clubs or organizations: Not on file     Relationship status: Not on file     Intimate partner violence     Fear of current  or ex partner: Not on file     Emotionally abused: Not on file     Physically abused: Not on file     Forced sexual activity: Not on file   Other Topics Concern     Parent/sibling w/ CABG, MI or angioplasty before 65F 55M? Not Asked   Social History Narrative     Not on file            Review of Systems:   Skin:  Positive for     Eyes:  Negative    ENT:  Negative    Respiratory:  Positive for CPAP;sleep apnea  Cardiovascular:    Positive for;edema;fatigue  Gastroenterology: Negative    Genitourinary:  not assessed    Musculoskeletal:  Positive for arthritis(patient in PT for both shoulders)  Neurologic:  Positive for numbness or tingling of hands  Psychiatric:  Negative    Heme/Lymph/Imm:  Negative    Endocrine:  Positive for thyroid disorder         Physical Exam:   Vitals: /74   Pulse 75   Wt 130.6 kg (288 lb)   BMI 39.05 kg/m     Wt Readings from Last 4 Encounters:   02/23/21 130.6 kg (288 lb)   02/19/21 128.8 kg (284 lb)   12/21/20 127.9 kg (282 lb)   12/14/20 127.9 kg (282 lb)     GEN: well nourished, in no acute distress.  HEENT:  Pupils equal, round. Sclerae nonicteric.   NECK: Supple, no masses appreciated.   C/V:  Regular rate and rhythm, no murmur, rub or gallop.    RESP: Respirations are unlabored. Clear to auscultation bilaterally without wheezing, rales, or rhonchi.  GI: Abdomen soft, obese, nontender.  EXTREM: +1-2 LE edema.  NEURO: Alert and oriented, cooperative.  SKIN: Warm and dry.        Data:   ECHO 2/27/19 @ [place]  Borderline LV enlargement, EF 66%  No regional wall motion abnormalities  Grade I diastolic dysfunction  RV normal size/function  Normal IVC      Right heart catheterization 11/5/19    Right sided filling pressures are mildly elevated.    Normal PA pressures.    Left sided filling pressures are normal.    Normal cardiac output level.     1.Generally normal PA and wedge pressure with mildly elevated RA filling pressure. Mild peripheral edema could correlate with the elevated  CVP  2.Normal PVR,SVR. The diastolic filling gradient and the transpulmonary gradient are mildly elevated but there is essentially no pulmonary hypertension so significance of these findings is uncertain  3. With mild sedation and pt wide awake systemic hypoxia (O2 sat down  To 86-87%) was noted. Correlation with sleep study recommended.    LIPID RESULTS:  Lab Results   Component Value Date    CHOL 138 02/19/2021    HDL 44 02/19/2021    LDL 52 02/19/2021    TRIG 209 (H) 02/19/2021     LIVER ENZYME RESULTS:  Lab Results   Component Value Date    AST 14 02/19/2021    ALT 26 02/19/2021     CBC RESULTS:  Lab Results   Component Value Date    WBC 10.6 02/19/2021    RBC 3.78 (L) 02/19/2021    HGB 12.0 (L) 02/19/2021    HCT 38.8 (L) 02/19/2021     (H) 02/19/2021    MCH 31.7 02/19/2021    MCHC 30.9 (L) 02/19/2021    RDW 15.3 (H) 02/19/2021     02/19/2021     BMP RESULTS:  Lab Results   Component Value Date     02/19/2021    POTASSIUM 5.4 (H) 02/19/2021    CHLORIDE 106 02/19/2021    CO2 26 02/19/2021    ANIONGAP 6 02/19/2021    GLC 82 02/19/2021    BUN 53 (H) 02/19/2021    CR 1.34 (H) 02/19/2021    GFRESTIMATED 49 (L) 02/19/2021    GFRESTBLACK 57 (L) 02/19/2021    LAWSON 9.5 02/19/2021      A1C RESULTS:  Lab Results   Component Value Date    A1C 5.8 (H) 02/19/2021     INR RESULTS:  Lab Results   Component Value Date    INR 2.90 (H) 02/19/2021    INR 3.30 (H) 12/18/2020            Medications     Current Outpatient Medications   Medication Sig Dispense Refill     acetaminophen (TYLENOL) 325 MG tablet Take 325-650 mg by mouth every 6 hours as needed for mild pain       Acetylcarnitine HCl (ACETYL L-CARNITINE) 500 MG CAPS Take 1 capsule by mouth 2 times daily       albuterol (PROAIR HFA/PROVENTIL HFA/VENTOLIN HFA) 108 (90 Base) MCG/ACT inhaler Inhale 2 puffs into the lungs every 4 hours as needed for shortness of breath / dyspnea or wheezing 1 Inhaler 0     albuterol (PROVENTIL) (2.5 MG/3ML) 0.083% neb solution  INHALE 1 VIAL BY NEBULIZATION EVERY 4 HOURS AS NEEDED FOR SHORTNESS OF BREATH/DYSPNEA OR WHEEZING. 75 mL 3     aspirin (ASA) 81 MG chewable tablet Take 1 tablet by mouth daily       atorvastatin (LIPITOR) 40 MG tablet Take 1 tablet (40 mg) by mouth every evening 90 tablet 3     carvedilol (COREG) 12.5 MG tablet Take 1 tablet (12.5 mg) by mouth 2 times daily 180 tablet 3     clonazePAM (KLONOPIN) 0.5 MG tablet TAKE 1 TABLET BY MOUTH TWICE A DAY AS NEEDED 60 tablet 2     co-enzyme Q-10 100 MG CAPS capsule Take 100 mg by mouth daily       diltiazem ER COATED BEADS (CARDIZEM CD/CARTIA XT) 300 MG 24 hr capsule TAKE 1 CAPSULE BY MOUTH EVERY DAY 90 capsule 3     furosemide (LASIX) 20 MG tablet Take 2 tablets (40 mg) by mouth 2 times daily 360 tablet 3     isosorbide mononitrate (IMDUR) 30 MG 24 hr tablet Take 1 tablet (30 mg) by mouth daily 90 tablet 3     levothyroxine (SYNTHROID/LEVOTHROID) 175 MCG tablet Take 1 tablet (175 mcg) by mouth daily Additional 1/2 tablet on Sundays 90 tablet 3     lisinopril (ZESTRIL) 40 MG tablet Take 1 tablet (40 mg) by mouth daily 90 tablet 3     metolazone (ZAROXOLYN) 2.5 MG tablet Take 1 tablet (2.5 mg) by mouth 2 times daily 180 tablet 3     Multiple vitamin TABS Take 1 tablet by mouth daily       order for DME Oxygen 2 Li/min  Continuous. At night bled into PAP device. In the daytime, NC with bubbler: requiring portability 1 Device 0     Psyllium (METAMUCIL PO) Take by mouth 2 times daily       spironolactone (ALDACTONE) 25 MG tablet Patient reports taking 1 every other day 60 tablet 4     tamsulosin (FLOMAX) 0.4 MG capsule Take 1 capsule (0.4 mg) by mouth daily 90 capsule 3     Taurine 500 MG CAPS Take 500 mg by mouth 2 times daily       venlafaxine (EFFEXOR) 37.5 MG tablet TAKE 1 TABLET BY MOUTH TWICE DAILY. 180 tablet 3     warfarin ANTICOAGULANT (COUMADIN) 5 MG tablet TAKE 1.5 TABS WEDNESDAY & TAKE 1 TAB A DAY ON OTHER DAYS OF WEEK, OR AS DIRECTED BY THE INR CLINIC 100 tablet 1           Past Medical History     Past Medical History:   Diagnosis Date     (HFpEF) heart failure with preserved ejection fraction (H)      BPH with urinary obstruction      CAD (coronary artery disease)     PCI w/ [facility] in 2014     Cardiac pacemaker in situ      Chronic diastolic heart failure (H)      COPD (chronic obstructive pulmonary disease) (H)      DVT (deep venous thrombosis) (H)      Dyspnea      Essential hypertension      Fatigue      HLD (hyperlipidemia)      Hypothyroidism      Lymphedema      Obesity, Class III, BMI 40-49.9 (morbid obesity) (H)      RIN (obstructive sleep apnea)     has refused CPAP     Paroxysmal A-fib (H)      PE (pulmonary thromboembolism) (H)      Past Surgical History:   Procedure Laterality Date     cardiac stenting       CV RIGHT HEART CATH MEASUREMENTS RECORDED N/A 11/5/2019    Procedure: Right Heart Cath;  Surgeon: Roberto Hernandez MD;  Location:  HEART CARDIAC CATH LAB     ENT SURGERY      tonsillectomy     IMPLANT PACEMAKER       ORTHOPEDIC SURGERY Right 2012    knee replacement     Family History   Problem Relation Age of Onset     Hypertension Father             Allergies   No known allergies    35 minutes spent on the date of the encounter doing chart review, history and exam, documentation and further activities as noted above    VAMSI Mckeon [facility]  Pager: [phone]    cc:   Brent Portillo MD  [address] [address]

## 2021-02-23 NOTE — PATIENT INSTRUCTIONS
1. No medication changes  2. Follow up with Dr. Portillo in 1 year  3. Please call with any questions/concerns [phone]

## 2021-02-23 NOTE — PROGRESS NOTES
Cardiology Clinic Progress Note  Erich Craven MRN# [id number]   YOB: 1940 Age: 80 year old   Primary Cardiologist: Dr. Portillo  Reason for visit: Cardiology follow up             Assessment and Plan:   Erich Craven is a very pleasant 80 year old male with a history of coronary artery disease, paroxsymal atrial fibrillation, s/p PPM for conduction system disease, hypertension, hyperlipidemia, RIN and morbid obesity.     I saw patient today for cardiology follow up. He is doing well from a cardiovascular standpoint. No angina. Continued chronic LE edema which he states is stable and weight has remained stable. Labs showed stable kidney function. Potassium was elevated at 5.4 and PCP stopped his spironolactone and has follow up labs ordered for 1-2 weeks. No medication changes today and will have him follow up with Dr. Portillo in 6 months as requested. Encouraged to call with any questions/concerns.       1. Coronary artery disease with history of stenting of his proximal LAD and mid-proximal RCA in 2014. Stable, no signs/symptoms of ischemia.               - Continue aspirin, statin and betablocker therapy              - Counseled patient on lifestyle modifications     2. Near normal pulmonary pressures and normal wedge on RHC 11/5/19 RA 13mmHg, PA 27mmHg, PCWP 13mmHg and cardiac output 2.81. Unknown weight at time of RHC.      3. Chronic lower extremity edema - not secondary to heart failure. Right heart catheterization completed 11/5/2019 showed near normal pulmonary pressures and normal wedge pressure. Reports his weight has been stable.   - Continue current diuretic regimen (furosemide and metolazone)              - Reviewed low sodium diet and 2L fluid restriction     4. Paroxsymal atrial fibrillation - stable, rate controlled, asymptomatic. Device interrogation from November showed 2 mode switches, compromising < 1% of the time.               - Continue diltiazem and carvedilol       5. S/p PPM for conduction system disease - stable, continue routine device checks.      6. Hypertension - controlled. His spironolactone was recently stopped by PCP blood pressure today controlled off spironolactone.               - Continue carvedilol, diltiazem, isosorbide mononitrate, lisinopril and diuretics (furosemide and metolazone)              - Counseled patient on lifestyle modifications to help manage BP     7. Hyperlipidemia - lipid panel 2/19/21 demonstrated total cholesterol 138, HDL 44, LDL 52 and triglycerides 209              - continue atorvastatin     8. Obstructive sleep apnea - complaint with CPAP for past month, counseled patient on the importance of compliance with CPAP.      9. Morbid obesity -counseled patient on weight loss strategies, reviewed techniques to help manage portion size.         Changes today: none    Follow up plan:     Follow up with Dr. Keene in 6 months with BMP prior as already requested.         History of Presenting Illness:    Erich Craven is a very pleasant 80 year old male with a history of coronary artery disease, paroxsymal atrial fibrillation, s/p PPM for conduction system disease, hypertension, hyperlipidemia, RIN and morbid obesity.      Patient moved from [facility] this past year to be closer to his family. He established cardiology care with Dr. Keene. Patient has history of stenting of his proximal LAD and mid-proximal RCA in 2014. Echocardiogram completed in February 2019 at [facility] showed borderline LV enlargement, EF 66%, no regional wall motion abnormalities, grade I diastolic dysfunction, RV normal size/function and normal IVC. During patients initial visit with Dr. Umer Dixon in April 2019 a NTproBNP was checked due to conflicting physical exam findings and objective findings on echocardiogram, NTproBNP was normal. During September visit NTproBNP was again repeat and again normal at 71. There was question if lower extremity  edema was from other conditions than congestive heart failure. Dr. Keene recommended right heart catheterization to confirm heart failure.      Right heart catheterization 11/5/2019 demonstrated mildly elevated right sided filling pressures, normal PA pressures, normal left sided filling pressures and normal cardiac output (RA 13mmHg, PA 27mmHg, PCWP 13mmHg and cardiac output 2.81). With mild sedation and pt wide awake systemic hypoxia (O2 sat down  To 86-87%) was noted. Correlation with sleep study recommended. Dr. Keene reviewed right heart catheterization results, noting that patients shortness of breath and swelling were not secondary to heart failure, recommended sleep study.     Jaun jauregui saw Dr. Keene in May 2020 at which time he was doing well from a cardiovascular standpoint. No medications were changed.     His most recent device interrogation 11/2020 showed 2 mode switches compromising < 1% of the time. 3 ventricular high rates, EGMs show SVT lasting 2-3 seconds, rates 150-160bpm. He reports no symptoms today.     On labs completed by PCP last week potassium was 5.4, spironolactone was stopped. PCP has ordered a recheck in 1-2 weeks.     Patient is here today for cardiology follow up.     Patient reports feeling good. Monitoring weights daily a home. States ranging in the 280#s at home. Which he states is stable. Denies chest pain or chest tightness. Denies shortness of breath at rest. Exertional dyspnea with more strenuous activity, no changes. Able to complete ADLs independently. Sleeping good. Using CPAP nightly. Denies orthopnea or PND. Denies dizziness, lightheadedness or other presyncopal symptoms. Denies tachycardia or palpitations.     Labs from 2/19 showed overall stable kidney function, creatinine 1.34, BUN 53. Potassium was elevated at 5.4 (PCP stopped spironolactone), hemoglobin 12, NTproBNP 136. Lipid panel showed total cholesterol 138, HDL 44, LDL 52 and triglycerides  209. Blood pressure 123/74 and HR 75 in clinic today.    Appetite good. Eating meals at home. Eating salads/fruits, vegetarian. Has a puppy which has kept him active. Denies alcohol use. Denies tobacco use.         Recent Hospitalizations   None        Social History    Living independently in an apartment. 5 children. Moved back to [place] to be near children (3 live here) 8 grandchildren. Enjoys writing and astrology.    Social History     Socioeconomic History     Marital status: Single     Spouse name: Not on file     Number of children: Not on file     Years of education: Not on file     Highest education level: Not on file   Occupational History     Not on file   Social Needs     Financial resource strain: Not on file     Food insecurity     Worry: Not on file     Inability: Not on file     Transportation needs     Medical: Not on file     Non-medical: Not on file   Tobacco Use     Smoking status: Former Smoker     Packs/day: 0.50     Years: 25.00     Pack years: 12.50     Smokeless tobacco: Never Used     Tobacco comment: quit in 1989   Substance and Sexual Activity     Alcohol use: No     Frequency: Never     Comment: quit in 1989; recovering     Drug use: No     Sexual activity: Not Currently     Partners: Female   Lifestyle     Physical activity     Days per week: Not on file     Minutes per session: Not on file     Stress: Not on file   Relationships     Social connections     Talks on phone: Not on file     Gets together: Not on file     Attends Holiness service: Not on file     Active member of club or organization: Not on file     Attends meetings of clubs or organizations: Not on file     Relationship status: Not on file     Intimate partner violence     Fear of current or ex partner: Not on file     Emotionally abused: Not on file     Physically abused: Not on file     Forced sexual activity: Not on file   Other Topics Concern     Parent/sibling w/ CABG, MI or angioplasty before 65F 55M? Not Asked    Social History Narrative     Not on file            Review of Systems:   Skin:  Positive for     Eyes:  Negative    ENT:  Negative    Respiratory:  Positive for CPAP;sleep apnea  Cardiovascular:    Positive for;edema;fatigue  Gastroenterology: Negative    Genitourinary:  not assessed    Musculoskeletal:  Positive for arthritis(patient in PT for both shoulders)  Neurologic:  Positive for numbness or tingling of hands  Psychiatric:  Negative    Heme/Lymph/Imm:  Negative    Endocrine:  Positive for thyroid disorder         Physical Exam:   Vitals: /74   Pulse 75   Wt 130.6 kg (288 lb)   BMI 39.05 kg/m     Wt Readings from Last 4 Encounters:   02/23/21 130.6 kg (288 lb)   02/19/21 128.8 kg (284 lb)   12/21/20 127.9 kg (282 lb)   12/14/20 127.9 kg (282 lb)     GEN: well nourished, in no acute distress.  HEENT:  Pupils equal, round. Sclerae nonicteric.   NECK: Supple, no masses appreciated.   C/V:  Regular rate and rhythm, no murmur, rub or gallop.    RESP: Respirations are unlabored. Clear to auscultation bilaterally without wheezing, rales, or rhonchi.  GI: Abdomen soft, obese, nontender.  EXTREM: +1-2 LE edema.  NEURO: Alert and oriented, cooperative.  SKIN: Warm and dry.        Data:   ECHO 2/27/19 @ [facility]  Borderline LV enlargement, EF 66%  No regional wall motion abnormalities  Grade I diastolic dysfunction  RV normal size/function  Normal IVC      Right heart catheterization 11/5/19    Right sided filling pressures are mildly elevated.    Normal PA pressures.    Left sided filling pressures are normal.    Normal cardiac output level.     1.Generally normal PA and wedge pressure with mildly elevated RA filling pressure. Mild peripheral edema could correlate with the elevated CVP  2.Normal PVR,SVR. The diastolic filling gradient and the transpulmonary gradient are mildly elevated but there is essentially no pulmonary hypertension so significance of these findings is uncertain  3. With mild sedation and pt  wide awake systemic hypoxia (O2 sat down  To 86-87%) was noted. Correlation with sleep study recommended.    LIPID RESULTS:  Lab Results   Component Value Date    CHOL 138 02/19/2021    HDL 44 02/19/2021    LDL 52 02/19/2021    TRIG 209 (H) 02/19/2021     LIVER ENZYME RESULTS:  Lab Results   Component Value Date    AST 14 02/19/2021    ALT 26 02/19/2021     CBC RESULTS:  Lab Results   Component Value Date    WBC 10.6 02/19/2021    RBC 3.78 (L) 02/19/2021    HGB 12.0 (L) 02/19/2021    HCT 38.8 (L) 02/19/2021     (H) 02/19/2021    MCH 31.7 02/19/2021    MCHC 30.9 (L) 02/19/2021    RDW 15.3 (H) 02/19/2021     02/19/2021     BMP RESULTS:  Lab Results   Component Value Date     02/19/2021    POTASSIUM 5.4 (H) 02/19/2021    CHLORIDE 106 02/19/2021    CO2 26 02/19/2021    ANIONGAP 6 02/19/2021    GLC 82 02/19/2021    BUN 53 (H) 02/19/2021    CR 1.34 (H) 02/19/2021    GFRESTIMATED 49 (L) 02/19/2021    GFRESTBLACK 57 (L) 02/19/2021    LAWSON 9.5 02/19/2021      A1C RESULTS:  Lab Results   Component Value Date    A1C 5.8 (H) 02/19/2021     INR RESULTS:  Lab Results   Component Value Date    INR 2.90 (H) 02/19/2021    INR 3.30 (H) 12/18/2020            Medications     Current Outpatient Medications   Medication Sig Dispense Refill     acetaminophen (TYLENOL) 325 MG tablet Take 325-650 mg by mouth every 6 hours as needed for mild pain       Acetylcarnitine HCl (ACETYL L-CARNITINE) 500 MG CAPS Take 1 capsule by mouth 2 times daily       albuterol (PROAIR HFA/PROVENTIL HFA/VENTOLIN HFA) 108 (90 Base) MCG/ACT inhaler Inhale 2 puffs into the lungs every 4 hours as needed for shortness of breath / dyspnea or wheezing 1 Inhaler 0     albuterol (PROVENTIL) (2.5 MG/3ML) 0.083% neb solution INHALE 1 VIAL BY NEBULIZATION EVERY 4 HOURS AS NEEDED FOR SHORTNESS OF BREATH/DYSPNEA OR WHEEZING. 75 mL 3     aspirin (ASA) 81 MG chewable tablet Take 1 tablet by mouth daily       atorvastatin (LIPITOR) 40 MG tablet Take 1 tablet  (40 mg) by mouth every evening 90 tablet 3     carvedilol (COREG) 12.5 MG tablet Take 1 tablet (12.5 mg) by mouth 2 times daily 180 tablet 3     clonazePAM (KLONOPIN) 0.5 MG tablet TAKE 1 TABLET BY MOUTH TWICE A DAY AS NEEDED 60 tablet 2     co-enzyme Q-10 100 MG CAPS capsule Take 100 mg by mouth daily       diltiazem ER COATED BEADS (CARDIZEM CD/CARTIA XT) 300 MG 24 hr capsule TAKE 1 CAPSULE BY MOUTH EVERY DAY 90 capsule 3     furosemide (LASIX) 20 MG tablet Take 2 tablets (40 mg) by mouth 2 times daily 360 tablet 3     isosorbide mononitrate (IMDUR) 30 MG 24 hr tablet Take 1 tablet (30 mg) by mouth daily 90 tablet 3     levothyroxine (SYNTHROID/LEVOTHROID) 175 MCG tablet Take 1 tablet (175 mcg) by mouth daily Additional 1/2 tablet on Sundays 90 tablet 3     lisinopril (ZESTRIL) 40 MG tablet Take 1 tablet (40 mg) by mouth daily 90 tablet 3     metolazone (ZAROXOLYN) 2.5 MG tablet Take 1 tablet (2.5 mg) by mouth 2 times daily 180 tablet 3     Multiple vitamin TABS Take 1 tablet by mouth daily       order for DME Oxygen 2 Li/min  Continuous. At night bled into PAP device. In the daytime, NC with bubbler: requiring portability 1 Device 0     Psyllium (METAMUCIL PO) Take by mouth 2 times daily       spironolactone (ALDACTONE) 25 MG tablet Patient reports taking 1 every other day 60 tablet 4     tamsulosin (FLOMAX) 0.4 MG capsule Take 1 capsule (0.4 mg) by mouth daily 90 capsule 3     Taurine 500 MG CAPS Take 500 mg by mouth 2 times daily       venlafaxine (EFFEXOR) 37.5 MG tablet TAKE 1 TABLET BY MOUTH TWICE DAILY. 180 tablet 3     warfarin ANTICOAGULANT (COUMADIN) 5 MG tablet TAKE 1.5 TABS WEDNESDAY & TAKE 1 TAB A DAY ON OTHER DAYS OF WEEK, OR AS DIRECTED BY THE INR CLINIC 100 tablet 1          Past Medical History     Past Medical History:   Diagnosis Date     (HFpEF) heart failure with preserved ejection fraction (H)      BPH with urinary obstruction      CAD (coronary artery disease)     PCI w/ [facility] in 2014      Cardiac pacemaker in situ      Chronic diastolic heart failure (H)      COPD (chronic obstructive pulmonary disease) (H)      DVT (deep venous thrombosis) (H)      Dyspnea      Essential hypertension      Fatigue      HLD (hyperlipidemia)      Hypothyroidism      Lymphedema      Obesity, Class III, BMI 40-49.9 (morbid obesity) (H)      IRN (obstructive sleep apnea)     has refused CPAP     Paroxysmal A-fib (H)      PE (pulmonary thromboembolism) (H)      Past Surgical History:   Procedure Laterality Date     cardiac stenting       CV RIGHT HEART CATH MEASUREMENTS RECORDED N/A 11/5/2019    Procedure: Right Heart Cath;  Surgeon: Roberto Hernandez MD;  Location:  HEART CARDIAC CATH LAB     ENT SURGERY      tonsillectomy     IMPLANT PACEMAKER       ORTHOPEDIC SURGERY Right 2012    knee replacement     Family History   Problem Relation Age of Onset     Hypertension Father             Allergies   No known allergies    35 minutes spent on the date of the encounter doing chart review, history and exam, documentation and further activities as noted above      VAMSI Mckeon [facility]  Pager: [phone]

## 2021-02-26 ENCOUNTER — OFFICE VISIT (OUTPATIENT)
Dept: ORTHOPEDICS | Facility: CLINIC | Age: 81
End: 2021-02-26
Attending: INTERNAL MEDICINE
Payer: MEDICARE

## 2021-02-26 ENCOUNTER — MEDICAL CORRESPONDENCE (OUTPATIENT)
Dept: HEALTH INFORMATION MANAGEMENT | Facility: CLINIC | Age: 81
End: 2021-02-26

## 2021-02-26 ENCOUNTER — ANCILLARY PROCEDURE (OUTPATIENT)
Dept: GENERAL RADIOLOGY | Facility: CLINIC | Age: 81
End: 2021-02-26
Attending: ORTHOPAEDIC SURGERY
Payer: MEDICARE

## 2021-02-26 VITALS
SYSTOLIC BLOOD PRESSURE: 102 MMHG | BODY MASS INDEX: 39.12 KG/M2 | WEIGHT: 288.8 LBS | DIASTOLIC BLOOD PRESSURE: 58 MMHG | HEIGHT: 72 IN

## 2021-02-26 DIAGNOSIS — G89.29 CHRONIC PAIN OF BOTH SHOULDERS: ICD-10-CM

## 2021-02-26 DIAGNOSIS — M25.512 CHRONIC PAIN OF BOTH SHOULDERS: ICD-10-CM

## 2021-02-26 DIAGNOSIS — M19.012 PRIMARY OSTEOARTHRITIS OF LEFT SHOULDER: ICD-10-CM

## 2021-02-26 DIAGNOSIS — M25.511 CHRONIC PAIN OF BOTH SHOULDERS: ICD-10-CM

## 2021-02-26 DIAGNOSIS — M19.011 PRIMARY OSTEOARTHRITIS OF RIGHT SHOULDER: Primary | ICD-10-CM

## 2021-02-26 DIAGNOSIS — I26.99 PE (PULMONARY THROMBOEMBOLISM) (H): ICD-10-CM

## 2021-02-26 DIAGNOSIS — I50.9 CHRONIC HEART FAILURE, UNSPECIFIED HEART FAILURE TYPE (H): ICD-10-CM

## 2021-02-26 PROCEDURE — 99204 OFFICE O/P NEW MOD 45 MIN: CPT | Performed by: ORTHOPAEDIC SURGERY

## 2021-02-26 PROCEDURE — 73030 X-RAY EXAM OF SHOULDER: CPT | Mod: RT | Performed by: ORTHOPAEDIC SURGERY

## 2021-02-26 ASSESSMENT — MIFFLIN-ST. JEOR: SCORE: 2053.15

## 2021-02-26 NOTE — LETTER
2/26/2021         RE: Erich Cravne  [address]  [facility] [address]        Dear Colleague,    Thank you for referring your patient, Erich Craven, to the [facility] [place]. Please see a copy of my visit note below.    HISTORY OF PRESENT ILLNESS:    Erich Craven is a 80 year old male who is seen in consultation at the request of Dr. Heath for bilateral shoulder pain. Patient is right handed. Patient notes pain in bilateral shoulders began about 3 years ago. He denies acute injury or trauma. Pain is located in bilateral anterior shoulders with occasional pain radiating down bilateral upper arms to elbows. Pain increases with reaching overhead, reaching behind back, putting on jacket, or carrying / lifting. He notes pain gradually gets worse throughout the day. Pain improves with Tylenol. Patient rates pain at worst is 8/10 and currently is 4/10.    Present symptoms: decreased ROM, denies numbness / tingling / burning  Treatments tried to this point: Tylenol 1000mg every 6-8 hours, physical therapy (2019), patient reports h/o right shoulder cortisone injection about 6-8 months ago which provided minimal relief  Orthopedic PMH: right TKA - 2012     Past Medical History:   Diagnosis Date     (HFpEF) heart failure with preserved ejection fraction (H)      BPH with urinary obstruction      CAD (coronary artery disease)     PCI w/ SUMMER in 2014     Cardiac pacemaker in situ      Chronic diastolic heart failure (H)      COPD (chronic obstructive pulmonary disease) (H)      DVT (deep venous thrombosis) (H)      Dyspnea      Essential hypertension      Fatigue      HLD (hyperlipidemia)      Hypothyroidism      Lymphedema      Obesity, Class III, BMI 40-49.9 (morbid obesity) (H)      RIN (obstructive sleep apnea)     has refused CPAP     Paroxysmal A-fib (H)      PE (pulmonary thromboembolism) (H)        Past Surgical History:   Procedure Laterality Date     cardiac stenting        CV RIGHT HEART CATH MEASUREMENTS RECORDED N/A 11/5/2019    Procedure: Right Heart Cath;  Surgeon: Roberto Hernandez MD;  Location:  HEART CARDIAC CATH LAB     ENT SURGERY      tonsillectomy     IMPLANT PACEMAKER       ORTHOPEDIC SURGERY Right 2012    knee replacement       Family History   Problem Relation Age of Onset     Hypertension Father        Social History     Socioeconomic History     Marital status: Single     Spouse name: Not on file     Number of children: Not on file     Years of education: Not on file     Highest education level: Not on file   Occupational History     Not on file   Social Needs     Financial resource strain: Not on file     Food insecurity     Worry: Not on file     Inability: Not on file     Transportation needs     Medical: Not on file     Non-medical: Not on file   Tobacco Use     Smoking status: Former Smoker     Packs/day: 0.50     Years: 25.00     Pack years: 12.50     Smokeless tobacco: Never Used     Tobacco comment: quit in 1989   Substance and Sexual Activity     Alcohol use: No     Frequency: Never     Comment: quit in 1989; recovering     Drug use: No     Sexual activity: Not Currently     Partners: Female   Lifestyle     Physical activity     Days per week: Not on file     Minutes per session: Not on file     Stress: Not on file   Relationships     Social connections     Talks on phone: Not on file     Gets together: Not on file     Attends Protestant service: Not on file     Active member of club or organization: Not on file     Attends meetings of clubs or organizations: Not on file     Relationship status: Not on file     Intimate partner violence     Fear of current or ex partner: Not on file     Emotionally abused: Not on file     Physically abused: Not on file     Forced sexual activity: Not on file   Other Topics Concern     Parent/sibling w/ CABG, MI or angioplasty before 65F 55M? Not Asked   Social History Narrative     Not on file       Current Outpatient  Medications   Medication Sig Dispense Refill     acetaminophen (TYLENOL) 325 MG tablet Take 325-650 mg by mouth every 6 hours as needed for mild pain       Acetylcarnitine HCl (ACETYL L-CARNITINE) 500 MG CAPS Take 1 capsule by mouth 2 times daily       albuterol (PROAIR HFA/PROVENTIL HFA/VENTOLIN HFA) 108 (90 Base) MCG/ACT inhaler Inhale 2 puffs into the lungs every 4 hours as needed for shortness of breath / dyspnea or wheezing 1 Inhaler 0     albuterol (PROVENTIL) (2.5 MG/3ML) 0.083% neb solution INHALE 1 VIAL BY NEBULIZATION EVERY 4 HOURS AS NEEDED FOR SHORTNESS OF BREATH/DYSPNEA OR WHEEZING. 75 mL 3     aspirin (ASA) 81 MG chewable tablet Take 1 tablet by mouth daily       atorvastatin (LIPITOR) 40 MG tablet Take 1 tablet (40 mg) by mouth every evening 90 tablet 3     carvedilol (COREG) 12.5 MG tablet Take 1 tablet (12.5 mg) by mouth 2 times daily 180 tablet 3     clonazePAM (KLONOPIN) 0.5 MG tablet TAKE 1 TABLET BY MOUTH TWICE A DAY AS NEEDED 60 tablet 2     co-enzyme Q-10 100 MG CAPS capsule Take 100 mg by mouth daily       diltiazem ER COATED BEADS (CARDIZEM CD/CARTIA XT) 300 MG 24 hr capsule TAKE 1 CAPSULE BY MOUTH EVERY DAY 90 capsule 3     furosemide (LASIX) 20 MG tablet Take 2 tablets (40 mg) by mouth 2 times daily 360 tablet 3     isosorbide mononitrate (IMDUR) 30 MG 24 hr tablet Take 1 tablet (30 mg) by mouth daily 90 tablet 3     levothyroxine (SYNTHROID/LEVOTHROID) 175 MCG tablet Take 1 tablet (175 mcg) by mouth daily Additional 1/2 tablet on Sundays 90 tablet 3     lisinopril (ZESTRIL) 40 MG tablet Take 1 tablet (40 mg) by mouth daily 90 tablet 3     metolazone (ZAROXOLYN) 2.5 MG tablet Take 1 tablet (2.5 mg) by mouth 2 times daily 180 tablet 3     Multiple vitamin TABS Take 1 tablet by mouth daily       order for DME Oxygen 2 Li/min  Continuous. At night bled into PAP device. In the daytime, NC with bubbler: requiring portability 1 Device 0     Psyllium (METAMUCIL PO) Take by mouth 2 times daily    "    spironolactone (ALDACTONE) 25 MG tablet Patient reports taking 1 every other day 60 tablet 4     tamsulosin (FLOMAX) 0.4 MG capsule Take 1 capsule (0.4 mg) by mouth daily 90 capsule 3     Taurine 500 MG CAPS Take 500 mg by mouth 2 times daily       venlafaxine (EFFEXOR) 37.5 MG tablet TAKE 1 TABLET BY MOUTH TWICE DAILY. 180 tablet 3     warfarin ANTICOAGULANT (COUMADIN) 5 MG tablet TAKE 1.5 TABS WEDNESDAY & TAKE 1 TAB A DAY ON OTHER DAYS OF WEEK, OR AS DIRECTED BY THE INR CLINIC 100 tablet 1       Allergies   Allergen Reactions     No Known Allergies        REVIEW OF SYSTEMS:  CONSTITUTIONAL:  NEGATIVE for fever, chills, change in weight  INTEGUMENTARY/SKIN:  NEGATIVE for worrisome rashes, moles or lesions  EYES:  NEGATIVE for vision changes or irritation  ENT/MOUTH:  NEGATIVE for ear, mouth and throat problems  RESP:  NEGATIVE for significant cough or SOB  BREAST:  NEGATIVE for masses, tenderness or discharge  CV:  NEGATIVE for chest pain, palpitations or peripheral edema  GI:  NEGATIVE for nausea, abdominal pain, heartburn, or change in bowel habits  :  Negative   MUSCULOSKELETAL:  See HPI above  NEURO:  NEGATIVE for weakness, dizziness or paresthesias  ENDOCRINE:  NEGATIVE for temperature intolerance, skin/hair changes  HEME/ALLERGY/IMMUNE:  NEGATIVE for bleeding problems  PSYCHIATRIC:  NEGATIVE for changes in mood or affect      PHYSICAL EXAM:  /58   Ht 1.829 m (6' 0.01\")   Wt 131 kg (288 lb 12.8 oz)   BMI 39.16 kg/m    Body mass index is 39.16 kg/m .   GENERAL APPEARANCE: healthy, alert and no distress   HEENT: No apparent thyroid megaly. Clear sclera with normal ocular movement  RESPIRATORY: No labored breathing  SKIN: no suspicious lesions or rashes  NEURO: Normal strength and tone, mentation intact and speech normal  VASCULAR: Good pulses, and capillary refill   LYMPH: no lymphadenopathy   PSYCH:  mentation appears normal and affect normal/bright    MUSCULOSKELETAL:  Not in acute distress  No " gross deformities of the upper extremities  Severe crepitus with any attempt of shoulder movement  Is basically fixed at neutral position in terms of internal and external rotation of the shoulders  No significant forward elevation movement of the shoulder joint itself, bilateral  Overall he is able to bring the hand to about 90 degrees but motion is coming from mostly at the thoracoscapular joint  Pain with shoulder movement  Elbow movement is well-maintained  Circulation is intact  Gross sensation is intact    In terms of the shoulder strength, isometric strength was noted to be fairly within normal limits limited by pain mostly.        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  Clinically, his musculature was felt to be intact in terms of rotator cuff strength.  Because of severe osteoarthritic changes in both shoulders, right worse than left, total shoulder arthroplasty was felt to be the best option.  Depending on the rotator cuff status and given the fact that he has type IIb glenoid wear on the right, reverse total shoulder arthroplasty might make more sense especially given his age.  The details of the surgery of anatomic versus reverse total shoulder arthroplasty, hospitalization of overnight, protection of the sling for 4 to 6 weeks, probable DVT/PE prophylaxis with Lovenox following the operation, possible neurovascular compromise, fracture, ongoing pain etc. were thoroughly explained.  Intraoperative decision will be made regarding anatomic versus reverse total shoulder arthroplasty.    All the questions were answered.  He wants to proceed with the surgery.      Imaging Interpretation:   Severe bilateral  shoulder DJD with bony erosion of the glenoid on the right shoulder.  Presence of pacemaker is noted.    Benny Houston MD  Department of Orthopedic Surgery        Disclaimer: This note consists of symbols derived from keyboarding, dictation and/or voice recognition software. As a result, there may be errors in the script that have gone undetected. Please consider this when interpreting information found in this chart.        Again, thank you for allowing me to participate in the care of your patient.        Sincerely,        Benny Houston MD

## 2021-02-26 NOTE — PATIENT INSTRUCTIONS
Right anatomic versus reverse total shoulder arthroplasty will be scheduled.  You will be given  preoperative cleansing solution to be used the night before and on the day of the surgery.

## 2021-02-26 NOTE — PROGRESS NOTES
HISTORY OF PRESENT ILLNESS:    Erich Craven is a 80 year old male who is seen in consultation at the request of Dr. Heath for bilateral shoulder pain. Patient is right handed. Patient notes pain in bilateral shoulders began about 3 years ago. He denies acute injury or trauma. Pain is located in bilateral anterior shoulders with occasional pain radiating down bilateral upper arms to elbows. Pain increases with reaching overhead, reaching behind back, putting on jacket, or carrying / lifting. He notes pain gradually gets worse throughout the day. Pain improves with Tylenol. Patient rates pain at worst is 8/10 and currently is 4/10.    Present symptoms: decreased ROM, denies numbness / tingling / burning  Treatments tried to this point: Tylenol 1000mg every 6-8 hours, physical therapy (2019), patient reports h/o right shoulder cortisone injection about 6-8 months ago which provided minimal relief  Orthopedic PMH: right TKA - 2012     Past Medical History:   Diagnosis Date     (HFpEF) heart failure with preserved ejection fraction (H)      BPH with urinary obstruction      CAD (coronary artery disease)     PCI w/ SUMMER in 2014     Cardiac pacemaker in situ      Chronic diastolic heart failure (H)      COPD (chronic obstructive pulmonary disease) (H)      DVT (deep venous thrombosis) (H)      Dyspnea      Essential hypertension      Fatigue      HLD (hyperlipidemia)      Hypothyroidism      Lymphedema      Obesity, Class III, BMI 40-49.9 (morbid obesity) (H)      RIN (obstructive sleep apnea)     has refused CPAP     Paroxysmal A-fib (H)      PE (pulmonary thromboembolism) (H)        Past Surgical History:   Procedure Laterality Date     cardiac stenting       CV RIGHT HEART CATH MEASUREMENTS RECORDED N/A 11/5/2019    Procedure: Right Heart Cath;  Surgeon: Roberto Hernandez MD;  Location:  HEART CARDIAC CATH LAB     ENT SURGERY      tonsillectomy     IMPLANT PACEMAKER       ORTHOPEDIC SURGERY Right 2012    knee  replacement       Family History   Problem Relation Age of Onset     Hypertension Father        Social History     Socioeconomic History     Marital status: Single     Spouse name: Not on file     Number of children: Not on file     Years of education: Not on file     Highest education level: Not on file   Occupational History     Not on file   Social Needs     Financial resource strain: Not on file     Food insecurity     Worry: Not on file     Inability: Not on file     Transportation needs     Medical: Not on file     Non-medical: Not on file   Tobacco Use     Smoking status: Former Smoker     Packs/day: 0.50     Years: 25.00     Pack years: 12.50     Smokeless tobacco: Never Used     Tobacco comment: quit in 1989   Substance and Sexual Activity     Alcohol use: No     Frequency: Never     Comment: quit in 1989; recovering     Drug use: No     Sexual activity: Not Currently     Partners: Female   Lifestyle     Physical activity     Days per week: Not on file     Minutes per session: Not on file     Stress: Not on file   Relationships     Social connections     Talks on phone: Not on file     Gets together: Not on file     Attends Jehovah's witness service: Not on file     Active member of club or organization: Not on file     Attends meetings of clubs or organizations: Not on file     Relationship status: Not on file     Intimate partner violence     Fear of current or ex partner: Not on file     Emotionally abused: Not on file     Physically abused: Not on file     Forced sexual activity: Not on file   Other Topics Concern     Parent/sibling w/ CABG, MI or angioplasty before 65F 55M? Not Asked   Social History Narrative     Not on file       Current Outpatient Medications   Medication Sig Dispense Refill     acetaminophen (TYLENOL) 325 MG tablet Take 325-650 mg by mouth every 6 hours as needed for mild pain       Acetylcarnitine HCl (ACETYL L-CARNITINE) 500 MG CAPS Take 1 capsule by mouth 2 times daily       albuterol  (PROAIR HFA/PROVENTIL HFA/VENTOLIN HFA) 108 (90 Base) MCG/ACT inhaler Inhale 2 puffs into the lungs every 4 hours as needed for shortness of breath / dyspnea or wheezing 1 Inhaler 0     albuterol (PROVENTIL) (2.5 MG/3ML) 0.083% neb solution INHALE 1 VIAL BY NEBULIZATION EVERY 4 HOURS AS NEEDED FOR SHORTNESS OF BREATH/DYSPNEA OR WHEEZING. 75 mL 3     aspirin (ASA) 81 MG chewable tablet Take 1 tablet by mouth daily       atorvastatin (LIPITOR) 40 MG tablet Take 1 tablet (40 mg) by mouth every evening 90 tablet 3     carvedilol (COREG) 12.5 MG tablet Take 1 tablet (12.5 mg) by mouth 2 times daily 180 tablet 3     clonazePAM (KLONOPIN) 0.5 MG tablet TAKE 1 TABLET BY MOUTH TWICE A DAY AS NEEDED 60 tablet 2     co-enzyme Q-10 100 MG CAPS capsule Take 100 mg by mouth daily       diltiazem ER COATED BEADS (CARDIZEM CD/CARTIA XT) 300 MG 24 hr capsule TAKE 1 CAPSULE BY MOUTH EVERY DAY 90 capsule 3     furosemide (LASIX) 20 MG tablet Take 2 tablets (40 mg) by mouth 2 times daily 360 tablet 3     isosorbide mononitrate (IMDUR) 30 MG 24 hr tablet Take 1 tablet (30 mg) by mouth daily 90 tablet 3     levothyroxine (SYNTHROID/LEVOTHROID) 175 MCG tablet Take 1 tablet (175 mcg) by mouth daily Additional 1/2 tablet on Sundays 90 tablet 3     lisinopril (ZESTRIL) 40 MG tablet Take 1 tablet (40 mg) by mouth daily 90 tablet 3     metolazone (ZAROXOLYN) 2.5 MG tablet Take 1 tablet (2.5 mg) by mouth 2 times daily 180 tablet 3     Multiple vitamin TABS Take 1 tablet by mouth daily       order for DME Oxygen 2 Li/min  Continuous. At night bled into PAP device. In the daytime, NC with bubbler: requiring portability 1 Device 0     Psyllium (METAMUCIL PO) Take by mouth 2 times daily       spironolactone (ALDACTONE) 25 MG tablet Patient reports taking 1 every other day 60 tablet 4     tamsulosin (FLOMAX) 0.4 MG capsule Take 1 capsule (0.4 mg) by mouth daily 90 capsule 3     Taurine 500 MG CAPS Take 500 mg by mouth 2 times daily       venlafaxine  "(EFFEXOR) 37.5 MG tablet TAKE 1 TABLET BY MOUTH TWICE DAILY. 180 tablet 3     warfarin ANTICOAGULANT (COUMADIN) 5 MG tablet TAKE 1.5 TABS WEDNESDAY & TAKE 1 TAB A DAY ON OTHER DAYS OF WEEK, OR AS DIRECTED BY THE INR CLINIC 100 tablet 1       Allergies   Allergen Reactions     No Known Allergies        REVIEW OF SYSTEMS:  CONSTITUTIONAL:  NEGATIVE for fever, chills, change in weight  INTEGUMENTARY/SKIN:  NEGATIVE for worrisome rashes, moles or lesions  EYES:  NEGATIVE for vision changes or irritation  ENT/MOUTH:  NEGATIVE for ear, mouth and throat problems  RESP:  NEGATIVE for significant cough or SOB  BREAST:  NEGATIVE for masses, tenderness or discharge  CV:  NEGATIVE for chest pain, palpitations or peripheral edema  GI:  NEGATIVE for nausea, abdominal pain, heartburn, or change in bowel habits  :  Negative   MUSCULOSKELETAL:  See HPI above  NEURO:  NEGATIVE for weakness, dizziness or paresthesias  ENDOCRINE:  NEGATIVE for temperature intolerance, skin/hair changes  HEME/ALLERGY/IMMUNE:  NEGATIVE for bleeding problems  PSYCHIATRIC:  NEGATIVE for changes in mood or affect      PHYSICAL EXAM:  /58   Ht 1.829 m (6' 0.01\")   Wt 131 kg (288 lb 12.8 oz)   BMI 39.16 kg/m    Body mass index is 39.16 kg/m .   GENERAL APPEARANCE: healthy, alert and no distress   HEENT: No apparent thyroid megaly. Clear sclera with normal ocular movement  RESPIRATORY: No labored breathing  SKIN: no suspicious lesions or rashes  NEURO: Normal strength and tone, mentation intact and speech normal  VASCULAR: Good pulses, and capillary refill   LYMPH: no lymphadenopathy   PSYCH:  mentation appears normal and affect normal/bright    MUSCULOSKELETAL:  Not in acute distress  No gross deformities of the upper extremities  Severe crepitus with any attempt of shoulder movement  Is basically fixed at neutral position in terms of internal and external rotation of the shoulders  No significant forward elevation movement of the shoulder joint " itself, bilateral  Overall he is able to bring the hand to about 90 degrees but motion is coming from mostly at the thoracoscapular joint  Pain with shoulder movement  Elbow movement is well-maintained  Circulation is intact  Gross sensation is intact    In terms of the shoulder strength, isometric strength was noted to be fairly within normal limits limited by pain mostly.        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  Clinically, his musculature was felt to be intact in terms of rotator cuff strength.  Because of severe osteoarthritic changes in both shoulders, right worse than left, total shoulder arthroplasty was felt to be the best option.  Depending on the rotator cuff status and given the fact that he has type IIb glenoid wear on the right, reverse total shoulder arthroplasty might make more sense especially given his age.  The details of the surgery of anatomic versus reverse total shoulder arthroplasty, hospitalization of overnight, protection of the sling for 4 to 6 weeks, probable DVT/PE prophylaxis with Lovenox following the operation, possible neurovascular compromise, fracture, ongoing pain etc. were thoroughly explained.  Intraoperative decision will be made regarding anatomic versus reverse total shoulder arthroplasty.    All the questions were answered.  He wants to proceed with the surgery.      Imaging Interpretation:   Severe bilateral shoulder DJD with bony erosion of the glenoid on the right shoulder.  Presence of pacemaker is noted.    Benny Houston MD  Department of Orthopedic Surgery        Disclaimer: This note consists of symbols derived from keyboarding, dictation and/or voice recognition  software. As a result, there may be errors in the script that have gone undetected. Please consider this when interpreting information found in this chart.

## 2021-02-26 NOTE — PROGRESS NOTES
Faxed signed cmn for oxygen therapy to [facility] 2/18/2021.  Copy of cmn sent to [facility] rec for scan into [facility].

## 2021-02-27 ENCOUNTER — MYC MEDICAL ADVICE (OUTPATIENT)
Dept: ORTHOPEDICS | Facility: CLINIC | Age: 81
End: 2021-02-27

## 2021-03-01 ENCOUNTER — PREP FOR PROCEDURE (OUTPATIENT)
Dept: ORTHOPEDICS | Facility: CLINIC | Age: 81
End: 2021-03-01

## 2021-03-01 DIAGNOSIS — M19.011 PRIMARY OSTEOARTHRITIS OF RIGHT SHOULDER: Primary | ICD-10-CM

## 2021-03-01 DIAGNOSIS — H92.02 LEFT EAR PAIN: Primary | ICD-10-CM

## 2021-03-01 NOTE — TELEPHONE ENCOUNTER
Message from Jaun, regarding Right shoulder surgery. States he needs a date for the surgery so he can get a pre-op appointment set up.  Please call back.

## 2021-03-01 NOTE — TELEPHONE ENCOUNTER
Please see [facility]  Radiology read discusses a total hip, but x rays were for bilateral shoulders

## 2021-03-02 DIAGNOSIS — E87.5 HYPERKALEMIA: ICD-10-CM

## 2021-03-02 DIAGNOSIS — I82.4Z9 DEEP VEIN THROMBOSIS (DVT) OF DISTAL VEIN OF LOWER EXTREMITY, UNSPECIFIED CHRONICITY, UNSPECIFIED LATERALITY (H): ICD-10-CM

## 2021-03-02 DIAGNOSIS — I48.0 PAROXYSMAL A-FIB (H): ICD-10-CM

## 2021-03-02 DIAGNOSIS — I26.99 PE (PULMONARY THROMBOEMBOLISM) (H): ICD-10-CM

## 2021-03-02 PROCEDURE — 36415 COLL VENOUS BLD VENIPUNCTURE: CPT | Performed by: INTERNAL MEDICINE

## 2021-03-02 PROCEDURE — 80048 BASIC METABOLIC PNL TOTAL CA: CPT | Performed by: INTERNAL MEDICINE

## 2021-03-03 LAB
ANION GAP SERPL CALCULATED.3IONS-SCNC: 4 MMOL/L (ref 3–14)
BUN SERPL-MCNC: 57 MG/DL (ref 7–30)
CALCIUM SERPL-MCNC: 9.6 MG/DL (ref 8.5–10.1)
CHLORIDE SERPL-SCNC: 104 MMOL/L (ref 94–109)
CO2 SERPL-SCNC: 28 MMOL/L (ref 20–32)
CREAT SERPL-MCNC: 1.42 MG/DL (ref 0.66–1.25)
GFR SERPL CREATININE-BSD FRML MDRD: 46 ML/MIN/{1.73_M2}
GLUCOSE SERPL-MCNC: 93 MG/DL (ref 70–99)
POTASSIUM SERPL-SCNC: 4.5 MMOL/L (ref 3.4–5.3)
SODIUM SERPL-SCNC: 136 MMOL/L (ref 133–144)

## 2021-03-04 ENCOUNTER — OFFICE VISIT (OUTPATIENT)
Dept: URGENT CARE | Facility: URGENT CARE | Age: 81
End: 2021-03-04
Payer: MEDICARE

## 2021-03-04 ENCOUNTER — NURSE TRIAGE (OUTPATIENT)
Dept: NURSING | Facility: CLINIC | Age: 81
End: 2021-03-04

## 2021-03-04 ENCOUNTER — TRANSFERRED RECORDS (OUTPATIENT)
Dept: HEALTH INFORMATION MANAGEMENT | Facility: CLINIC | Age: 81
End: 2021-03-04

## 2021-03-04 ENCOUNTER — ANCILLARY PROCEDURE (OUTPATIENT)
Dept: GENERAL RADIOLOGY | Facility: CLINIC | Age: 81
End: 2021-03-04
Attending: NURSE PRACTITIONER
Payer: MEDICARE

## 2021-03-04 VITALS
SYSTOLIC BLOOD PRESSURE: 103 MMHG | WEIGHT: 291 LBS | BODY MASS INDEX: 39.46 KG/M2 | TEMPERATURE: 97.2 F | OXYGEN SATURATION: 93 % | DIASTOLIC BLOOD PRESSURE: 65 MMHG | HEART RATE: 72 BPM

## 2021-03-04 DIAGNOSIS — R05.9 COUGH: ICD-10-CM

## 2021-03-04 DIAGNOSIS — J44.1 COPD EXACERBATION (H): Primary | ICD-10-CM

## 2021-03-04 LAB
BASOPHILS # BLD AUTO: 0.1 10E9/L (ref 0–0.2)
BASOPHILS NFR BLD AUTO: 0.6 %
DIFFERENTIAL METHOD BLD: ABNORMAL
EOSINOPHIL # BLD AUTO: 0.6 10E9/L (ref 0–0.7)
EOSINOPHIL NFR BLD AUTO: 5.6 %
ERYTHROCYTE [DISTWIDTH] IN BLOOD BY AUTOMATED COUNT: 15.7 % (ref 10–15)
HCT VFR BLD AUTO: 36.1 % (ref 40–53)
HGB BLD-MCNC: 11.4 G/DL (ref 13.3–17.7)
LYMPHOCYTES # BLD AUTO: 2.4 10E9/L (ref 0.8–5.3)
LYMPHOCYTES NFR BLD AUTO: 23.4 %
MCH RBC QN AUTO: 32.7 PG (ref 26.5–33)
MCHC RBC AUTO-ENTMCNC: 31.6 G/DL (ref 31.5–36.5)
MCV RBC AUTO: 103 FL (ref 78–100)
MONOCYTES # BLD AUTO: 1.2 10E9/L (ref 0–1.3)
MONOCYTES NFR BLD AUTO: 11.7 %
NEUTROPHILS # BLD AUTO: 6.1 10E9/L (ref 1.6–8.3)
NEUTROPHILS NFR BLD AUTO: 58.7 %
PLATELET # BLD AUTO: 185 10E9/L (ref 150–450)
RBC # BLD AUTO: 3.49 10E12/L (ref 4.4–5.9)
WBC # BLD AUTO: 10.4 10E9/L (ref 4–11)

## 2021-03-04 PROCEDURE — U0005 INFEC AGEN DETEC AMPLI PROBE: HCPCS | Performed by: NURSE PRACTITIONER

## 2021-03-04 PROCEDURE — 99214 OFFICE O/P EST MOD 30 MIN: CPT | Mod: CS

## 2021-03-04 PROCEDURE — U0003 INFECTIOUS AGENT DETECTION BY NUCLEIC ACID (DNA OR RNA); SEVERE ACUTE RESPIRATORY SYNDROME CORONAVIRUS 2 (SARS-COV-2) (CORONAVIRUS DISEASE [COVID-19]), AMPLIFIED PROBE TECHNIQUE, MAKING USE OF HIGH THROUGHPUT TECHNOLOGIES AS DESCRIBED BY CMS-2020-01-R: HCPCS | Performed by: NURSE PRACTITIONER

## 2021-03-04 PROCEDURE — 36415 COLL VENOUS BLD VENIPUNCTURE: CPT | Performed by: NURSE PRACTITIONER

## 2021-03-04 PROCEDURE — 71046 X-RAY EXAM CHEST 2 VIEWS: CPT | Performed by: RADIOLOGY

## 2021-03-04 PROCEDURE — 85025 COMPLETE CBC W/AUTO DIFF WBC: CPT | Performed by: NURSE PRACTITIONER

## 2021-03-04 RX ORDER — PREDNISONE 20 MG/1
40 TABLET ORAL DAILY
Qty: 14 TABLET | Refills: 0 | Status: SHIPPED | OUTPATIENT
Start: 2021-03-04 | End: 2021-03-11

## 2021-03-04 ASSESSMENT — ENCOUNTER SYMPTOMS
COUGH: 1
ENDOCRINE NEGATIVE: 1
MUSCULOSKELETAL NEGATIVE: 1
RHINORRHEA: 1
SHORTNESS OF BREATH: 1
GASTROINTESTINAL NEGATIVE: 1
SORE THROAT: 1
CONSTITUTIONAL NEGATIVE: 1
WHEEZING: 1
VOICE CHANGE: 1
CARDIOVASCULAR NEGATIVE: 1
PSYCHIATRIC NEGATIVE: 1

## 2021-03-04 NOTE — TELEPHONE ENCOUNTER
Erich reports:  - Nasal congestion - started with a runny nose, now congested  - Chest congestion - couple days  - Hoarse voice  - Cough  - Right ear pain  - Occasional wheezing    He was seen by an ENT today for his ear pain. He was noted to have bilateral ear inflammation in addition to excess ear wax. He was also advised to be tested for Covid-19    Erich has  - History of COPD  - History of pneumonia  - Finished vaccination for Covid-19 on     5:46 pm - Smart web page sent to on-call provider, Dr. Richard Schmid:  MARIA DE JESUS [facility] [phone]   Pt: Erich Craven : 38 Chest congestion; Hoarse; Cough; Nasal ana; Occas Wheez; Hx COPD & Pneum; CoV2 Vaccine ; Saw ENT today for pain AD - Test advised MRN: [id number] PCP: Ivana    5:47 pm - Call received from Dr. Schmid; Advised to contact clinic - open until 6 pm    5:49 pm - Spoke to MARIA DE JESUS Don at [facility]; Pt advised to come to [facility].    5:52 pm - Notified pt of advice to be seen in [facility].    COVID 19 Nurse Triage Plan/Patient Instructions    Please be aware that novel coronavirus (COVID-19) may be circulating in the community. If you develop symptoms such as fever, cough, or SOB or if you have concerns about the presence of another infection including coronavirus (COVID-19), please contact your health care provider or visit [email or web address].     Disposition/Instructions    In-Person Visit with provider recommended. Reference Visit Selection Guide.    Thank you for taking steps to prevent the spread of this virus.  o Limit your contact with others.  o Wear a simple mask to cover your cough.  o Wash your hands well and often.    Resources     [employer] [facility]: About COVID-19: [email or web address]    CDC: What to Do If You're Sick: www.cdc.gov/coronavirus/2019-ncov/about/steps-when-sick.html    CDC: Ending Home Isolation: www.cdc.gov/coronavirus/2019-ncov/hcp/disposition-in-home-patients.html     CDC: Caring  for Someone: www.cdc.gov/coronavirus/2019-ncov/if-you-are-sick/care-for-someone.html     [facility]: Interim Guidance for Hospital Discharge to Home: www.health.[facility].[place]/diseases/coronavirus/hcp/hospdischarge.pdf    [facility] clinical trials (COVID-19 research studies): clinicalaffairs.[facility]-clinical-trials     Below are the COVID-19 hotlines at the [place] Department of Health ([facility]). Interpreters are available.   o For health questions: Call 113-988-8454 or 1-138.942.7418 (7 a.m. to 7 p.m.)  o For questions about schools and childcare: Call 600-147-0867 or 1-928.917.9583 (7 a.m. to 7 p.m.)     Beth Moncada RN  [facility]      Reason for Disposition    [1] Hoarseness starting > 24 hours ago AND [2] taking an ACE Inhibitor medication (e.g., benazepril/LOTENSIN, captopril/CAPOTEN, enalapril/VASOTEC, lisinopril/ZESTRIL)    [1] HIGH RISK patient (e.g., age > 64 years, diabetes, heart or lung disease, weak immune system) AND [2] new or worsening symptoms    Additional Information    Runny nose or congested nose    Negative: Severe difficulty breathing (e.g., struggling for each breath, speaks in single words)    Negative: Bluish (or gray) lips or face now    Negative: [1] Stridor AND [2] difficulty breathing    Negative: Started suddenly after sting from bee, wasp, or yellow jacket    Negative: Started suddenly after taking a medicine or allergic food (e.g., nuts, seafood)    Negative: Started suddenly along with widespread hives    Negative: Can't swallow normal secretions (e.g., drooling or spitting)    Negative: Tongue or facial swelling    Negative: Sounds like a life-threatening emergency to the triager    Negative: [1] Resolved choking episode AND [2] hoarseness lasts > 30 minutes    Negative: Direct blow to front of neck    Negative: Difficulty breathing    Negative: [1] Hoarseness starting in past 24 hours AND [2] taking an ACE Inhibitor medication (e.g.,  benazepril/LOTENSIN, captopril/CAPOTEN, enalapril/VASOTEC, lisinopril/ZESTRIL)    Negative: Patient sounds very sick or weak to the triager    Negative: Fever > 103 F (39.4 C)    Negative: Fever present > 3 days (72 hours)    Negative: SEVERE sore throat pain    Negative: Fever > 104 F (40 C)    Negative: [1] Difficulty breathing AND [2] not severe AND [3] not from stuffy nose (e.g., not relieved by cleaning out the nose)    Negative: Patient sounds very sick or weak to the triager    Negative: [1] Fever > 101 F (38.3 C) AND [2] age > 60    Negative: [1] Fever > 100.0 F (37.8 C) AND [2] bedridden (e.g., nursing home patient, CVA, chronic illness, recovering from surgery)    Negative: [1] Fever > 100.0 F (37.8 C) AND [2] diabetes mellitus or weak immune system (e.g., HIV positive, cancer chemo, splenectomy, organ transplant, chronic steroids)    Negative: Fever present > 3 days (72 hours)    Negative: [1] Fever returns after gone for over 24 hours AND [2] symptoms worse or not improved    Negative: [1] Sinus pain (not just congestion) AND [2] fever    Negative: Earache    Negative: [1] SEVERE sore throat AND [2] present > 24 hours    Negative: [1] Sinus congestion (pressure, fullness) AND [2] present > 10 days    Negative: [1] Nasal discharge AND [2] present > 10 days    Negative: [1] Using nasal washes and pain medicine > 24 hours AND [2] sinus pain (lower forehead, cheekbone, or eye) persists    Negative: Sores with yellow scabs around the nasal opening    Negative: Severe difficulty breathing (e.g., struggling for each breath, speaks in single words)    Negative: Sounds like a life-threatening emergency to the triager    Negative: SEVERE difficulty breathing (e.g., struggling for each breath, speaks in single words)    Negative: Difficult to awaken or acting confused (e.g., disoriented, slurred speech)    Negative: Bluish (or gray) lips or face now    Negative: Shock suspected (e.g., cold/pale/clammy skin, too  weak to stand, low BP, rapid pulse)    Negative: Sounds like a life-threatening emergency to the triager    Negative: SEVERE or constant chest pain or pressure (Exception: mild central chest pain, present only when coughing)    Negative: MODERATE difficulty breathing (e.g., speaks in phrases, SOB even at rest, pulse 100-120)    Negative: [1] Headache AND [2] stiff neck (can't touch chin to chest)    Negative: MILD difficulty breathing (e.g., minimal/no SOB at rest, SOB with walking, pulse <100)    Negative: Chest pain or pressure    Negative: Patient sounds very sick or weak to the triager    Negative: Fever > 103 F (39.4 C)    Negative: [1] Fever > 101 F (38.3 C) AND [2] age > 60    Negative: [1] Fever > 100.0 F (37.8 C) AND [2] bedridden (e.g., nursing home patient, CVA, chronic illness, recovering from surgery)    Protocols used: [id number], CORONAVIRUS (COVID-19) DIAGNOSED OR [id number]-HF 1.3, RESPIRATORY MULTIPLE SYMPTOMS - GUIDELINE [id number], COMMON COLD-A-AH

## 2021-03-05 ENCOUNTER — HOSPITAL ENCOUNTER (INPATIENT)
Facility: CLINIC | Age: 81
Setting detail: SURGERY ADMIT
End: 2021-03-05
Attending: ORTHOPAEDIC SURGERY | Admitting: ORTHOPAEDIC SURGERY
Payer: MEDICARE

## 2021-03-05 ENCOUNTER — TELEPHONE (OUTPATIENT)
Dept: ORTHOPEDICS | Facility: CLINIC | Age: 81
End: 2021-03-05

## 2021-03-05 DIAGNOSIS — M19.011 PRIMARY OSTEOARTHRITIS OF RIGHT SHOULDER: ICD-10-CM

## 2021-03-05 DIAGNOSIS — Z11.59 ENCOUNTER FOR SCREENING FOR OTHER VIRAL DISEASES: Primary | ICD-10-CM

## 2021-03-05 LAB
LABORATORY COMMENT REPORT: NORMAL
SARS-COV-2 RNA RESP QL NAA+PROBE: NEGATIVE
SARS-COV-2 RNA RESP QL NAA+PROBE: NORMAL
SPECIMEN SOURCE: NORMAL
SPECIMEN SOURCE: NORMAL

## 2021-03-05 NOTE — PROGRESS NOTES
ASSESSMENT/PLAN:    1. COPD exacerbation (H)  Patient has history of URI symptoms for past couple of days, he is at risk for COPD exacerbation. Chest x-ray was clear, CBC, testing for COVID pending, could consider CHF, will have him follow up with PCP next week to evaluate further.   Prednisone 40 mg once daily for 7 days  Patient Instructions   Guaifenesin 600 mg Twice daily for coughing and congestion  Acetaminophen (Tylenol) 325mg -650 mg every 6 hours for pain or discomfort.       2. Cough  OTC treatment as needed  - XR Chest 2 Views; Future  - CBC with platelets and differential  - Symptomatic COVID-19 Virus (Coronavirus) by PCR; Future       Medical Decision Making:    Differential Diagnosis:  URI Adult/Peds:  COPD exacerbation, URI  Cardiac: CHF    Serious Comorbid Conditions:  Adult:  CHF, COPD and CAD      Followup:    If not improving or if condition worsens, follow up with your Primary Care Provider, If not improving or if conditions worsens over the next 12-24 hours, go to the Emergency Department        SUBJECTIVE:   Erich Craven is a 80 year old male presenting with a chief complaint of   Chief Complaint   Patient presents with     Urgent Care     Pharyngitis     X 2 days. no swollen glands       He is an established patient of [facility].    URI Adult    Onset of symptoms was 3 day(s) ago.  Course of illness is worsening.    Severity moderate  Current and Associated symptoms: cough - non-productive, wheezing, shortness of breath, sore throat, hoarse voice and fatigue, does have some increased swelling in his legs, states his doctors have reduced his diuretic pills down.   Treatment measures tried include Nebulizer (name: Albuterol).  Predisposing factors include None. Has had both COVID vaccines.   History of COPD , heart failure, Hyperlipidemia, DMt2, HTN, A fib, CAD, Hx of DVT, chronic kidney disease stage 3, has a pacemaker      Review of Systems   Constitutional: Negative.    HENT: Positive for  congestion, ear pain, rhinorrhea, sore throat and voice change.    Respiratory: Positive for cough, shortness of breath and wheezing.    Cardiovascular: Negative.    Gastrointestinal: Negative.    Endocrine: Negative.    Genitourinary: Negative.    Musculoskeletal: Negative.    Skin: Negative.    Psychiatric/Behavioral: Negative.        Past Medical History:   Diagnosis Date     (HFpEF) heart failure with preserved ejection fraction (H)      BPH with urinary obstruction      CAD (coronary artery disease)     PCI w/ [facility] in 2014     Cardiac pacemaker in situ      Chronic diastolic heart failure (H)      COPD (chronic obstructive pulmonary disease) (H)      DVT (deep venous thrombosis) (H)      Dyspnea      Essential hypertension      Fatigue      HLD (hyperlipidemia)      Hypothyroidism      Lymphedema      Obesity, Class III, BMI 40-49.9 (morbid obesity) (H)      RIN (obstructive sleep apnea)     has refused CPAP     Paroxysmal A-fib (H)      PE (pulmonary thromboembolism) (H)      Family History   Problem Relation Age of Onset     Hypertension Father      Current Outpatient Medications   Medication Sig Dispense Refill     acetaminophen (TYLENOL) 325 MG tablet Take 325-650 mg by mouth every 6 hours as needed for mild pain       Acetylcarnitine HCl (ACETYL L-CARNITINE) 500 MG CAPS Take 1 capsule by mouth 2 times daily       albuterol (PROAIR HFA/PROVENTIL HFA/VENTOLIN HFA) 108 (90 Base) MCG/ACT inhaler Inhale 2 puffs into the lungs every 4 hours as needed for shortness of breath / dyspnea or wheezing 1 Inhaler 0     albuterol (PROVENTIL) (2.5 MG/3ML) 0.083% neb solution INHALE 1 VIAL BY NEBULIZATION EVERY 4 HOURS AS NEEDED FOR SHORTNESS OF BREATH/DYSPNEA OR WHEEZING. 75 mL 3     aspirin (ASA) 81 MG chewable tablet Take 1 tablet by mouth daily       atorvastatin (LIPITOR) 40 MG tablet Take 1 tablet (40 mg) by mouth every evening 90 tablet 3     carvedilol (COREG) 12.5 MG tablet Take 1 tablet (12.5 mg) by mouth 2 times  daily 180 tablet 3     clonazePAM (KLONOPIN) 0.5 MG tablet TAKE 1 TABLET BY MOUTH TWICE A DAY AS NEEDED 60 tablet 2     co-enzyme Q-10 100 MG CAPS capsule Take 100 mg by mouth daily       diltiazem ER COATED BEADS (CARDIZEM CD/CARTIA XT) 300 MG 24 hr capsule TAKE 1 CAPSULE BY MOUTH EVERY DAY 90 capsule 3     furosemide (LASIX) 20 MG tablet Take 2 tablets (40 mg) by mouth 2 times daily 360 tablet 3     isosorbide mononitrate (IMDUR) 30 MG 24 hr tablet Take 1 tablet (30 mg) by mouth daily 90 tablet 3     levothyroxine (SYNTHROID/LEVOTHROID) 175 MCG tablet Take 1 tablet (175 mcg) by mouth daily Additional 1/2 tablet on Sundays 90 tablet 3     lisinopril (ZESTRIL) 40 MG tablet Take 1 tablet (40 mg) by mouth daily 90 tablet 3     metolazone (ZAROXOLYN) 2.5 MG tablet Take 1 tablet (2.5 mg) by mouth 2 times daily 180 tablet 3     Multiple vitamin TABS Take 1 tablet by mouth daily       order for DME Oxygen 2 Li/min  Continuous. At night bled into PAP device. In the daytime, NC with bubbler: requiring portability 1 Device 0     Psyllium (METAMUCIL PO) Take by mouth 2 times daily       spironolactone (ALDACTONE) 25 MG tablet Patient reports taking 1 every other day 60 tablet 4     tamsulosin (FLOMAX) 0.4 MG capsule Take 1 capsule (0.4 mg) by mouth daily 90 capsule 3     Taurine 500 MG CAPS Take 500 mg by mouth 2 times daily       venlafaxine (EFFEXOR) 37.5 MG tablet TAKE 1 TABLET BY MOUTH TWICE DAILY. 180 tablet 3     warfarin ANTICOAGULANT (COUMADIN) 5 MG tablet TAKE 1.5 TABS WEDNESDAY & TAKE 1 TAB A DAY ON OTHER DAYS OF WEEK, OR AS DIRECTED BY THE INR CLINIC 100 tablet 1     Social History     Tobacco Use     Smoking status: Former Smoker     Packs/day: 0.50     Years: 25.00     Pack years: 12.50     Smokeless tobacco: Never Used     Tobacco comment: quit in 1989   Substance Use Topics     Alcohol use: No     Frequency: Never     Comment: quit in 1989; recovering       OBJECTIVE  /65 (BP Location: Right arm, Patient  Position: Sitting, Cuff Size: Adult Large)   Pulse 72   Temp 97.2  F (36.2  C) (Oral)   Wt 132 kg (291 lb)   SpO2 93%   BMI 39.46 kg/m      Physical Exam  Constitutional:       Appearance: Normal appearance.   HENT:      Right Ear: Tympanic membrane normal.      Left Ear: Tympanic membrane normal.      Ears:      Comments: Right canal eythematous     Mouth/Throat:      Mouth: Mucous membranes are dry.      Pharynx: Oropharynx is clear. No oropharyngeal exudate or posterior oropharyngeal erythema.   Cardiovascular:      Rate and Rhythm: Normal rate and regular rhythm.      Heart sounds: Normal heart sounds.      Comments: Has some increased lower leg edema bilaterally  Pulmonary:      Effort: Pulmonary effort is normal.      Breath sounds: No wheezing.      Comments: Diminished lung sounds bilaterally  Abdominal:      General: Abdomen is flat. Bowel sounds are normal.      Palpations: Abdomen is soft.   Skin:     General: Skin is warm and dry.   Neurological:      Mental Status: He is alert.         Labs:  Results for orders placed or performed in visit on 03/04/21 (from the past 24 hour(s))   CBC with platelets and differential   Result Value Ref Range    WBC 10.4 4.0 - 11.0 10e9/L    RBC Count 3.49 (L) 4.4 - 5.9 10e12/L    Hemoglobin 11.4 (L) 13.3 - 17.7 g/dL    Hematocrit 36.1 (L) 40.0 - 53.0 %     (H) 78 - 100 fl    MCH 32.7 26.5 - 33.0 pg    MCHC 31.6 31.5 - 36.5 g/dL    RDW 15.7 (H) 10.0 - 15.0 %    Platelet Count 185 150 - 450 10e9/L    % Neutrophils 58.7 %    % Lymphocytes 23.4 %    % Monocytes 11.7 %    % Eosinophils 5.6 %    % Basophils 0.6 %    Absolute Neutrophil 6.1 1.6 - 8.3 10e9/L    Absolute Lymphocytes 2.4 0.8 - 5.3 10e9/L    Absolute Monocytes 1.2 0.0 - 1.3 10e9/L    Absolute Eosinophils 0.6 0.0 - 0.7 10e9/L    Absolute Basophils 0.1 0.0 - 0.2 10e9/L    Diff Method Automated Method        X-Ray was done, my findings are: Clear

## 2021-03-05 NOTE — PATIENT INSTRUCTIONS
Guaifenesin 600 mg Twice daily for coughing and congestion  Acetaminophen (Tylenol) 325mg -650 mg every 6 hours for pain or discomfort.

## 2021-03-05 NOTE — TELEPHONE ENCOUNTER
Surgery scheduled, Covid order placed    Type of surgery: Right Anatomic versus reverse total shoulder arthroplasty, right  Location of surgery: [facility]  Date and time of surgery: 3/23/21 1:40pm  Surgeon: Dr. Houston  Pre-Op Appt Date: 3/17/21   Post-Op Appt Date: 3/5/21   Packet sent out: Yes  Pre-cert/Authorization completed:  No  Date: 3/5/21      Shavonne Tejeda, ATC    3/5/2021 at 4:37 PM

## 2021-03-09 ENCOUNTER — VIRTUAL VISIT (OUTPATIENT)
Dept: PHARMACY | Facility: CLINIC | Age: 81
End: 2021-03-09
Attending: INTERNAL MEDICINE
Payer: COMMERCIAL

## 2021-03-09 DIAGNOSIS — Z71.85 VACCINE COUNSELING: ICD-10-CM

## 2021-03-09 DIAGNOSIS — M19.011 PRIMARY OSTEOARTHRITIS OF RIGHT SHOULDER: ICD-10-CM

## 2021-03-09 DIAGNOSIS — I50.33 ACUTE ON CHRONIC HEART FAILURE WITH PRESERVED EJECTION FRACTION (H): ICD-10-CM

## 2021-03-09 DIAGNOSIS — N40.0 BENIGN PROSTATIC HYPERPLASIA WITHOUT LOWER URINARY TRACT SYMPTOMS: ICD-10-CM

## 2021-03-09 DIAGNOSIS — I48.0 PAROXYSMAL A-FIB (H): ICD-10-CM

## 2021-03-09 DIAGNOSIS — F41.9 ANXIETY: ICD-10-CM

## 2021-03-09 DIAGNOSIS — J44.9 CHRONIC OBSTRUCTIVE PULMONARY DISEASE, UNSPECIFIED COPD TYPE (H): ICD-10-CM

## 2021-03-09 DIAGNOSIS — E03.9 HYPOTHYROIDISM, UNSPECIFIED TYPE: ICD-10-CM

## 2021-03-09 DIAGNOSIS — I26.99 PE (PULMONARY THROMBOEMBOLISM) (H): ICD-10-CM

## 2021-03-09 DIAGNOSIS — I25.10 CORONARY ARTERY DISEASE INVOLVING NATIVE HEART WITHOUT ANGINA PECTORIS, UNSPECIFIED VESSEL OR LESION TYPE: ICD-10-CM

## 2021-03-09 DIAGNOSIS — E11.9 TYPE 2 DIABETES MELLITUS WITHOUT COMPLICATION, WITHOUT LONG-TERM CURRENT USE OF INSULIN (H): ICD-10-CM

## 2021-03-09 DIAGNOSIS — Z78.9 TAKES DIETARY SUPPLEMENTS: ICD-10-CM

## 2021-03-09 DIAGNOSIS — I82.4Z9 DEEP VEIN THROMBOSIS (DVT) OF DISTAL VEIN OF LOWER EXTREMITY, UNSPECIFIED CHRONICITY, UNSPECIFIED LATERALITY (H): ICD-10-CM

## 2021-03-09 DIAGNOSIS — E78.49 OTHER HYPERLIPIDEMIA: ICD-10-CM

## 2021-03-09 DIAGNOSIS — J44.1 COPD EXACERBATION (H): Primary | ICD-10-CM

## 2021-03-09 DIAGNOSIS — I10 ESSENTIAL HYPERTENSION: ICD-10-CM

## 2021-03-09 PROCEDURE — 99607 MTMS BY PHARM ADDL 15 MIN: CPT | Performed by: PHARMACIST

## 2021-03-09 PROCEDURE — 99605 MTMS BY PHARM NP 15 MIN: CPT | Performed by: PHARMACIST

## 2021-03-09 RX ORDER — VENLAFAXINE 37.5 MG/1
37.5 TABLET ORAL DAILY
Qty: 90 TABLET | Refills: 0
Start: 2021-03-09 | End: 2021-05-13 | Stop reason: ALTCHOICE

## 2021-03-09 RX ORDER — SERTRALINE HYDROCHLORIDE 25 MG/1
25 TABLET, FILM COATED ORAL DAILY
Qty: 45 TABLET | Refills: 0 | Status: SHIPPED | OUTPATIENT
Start: 2021-03-09 | End: 2021-04-01

## 2021-03-09 NOTE — LETTER
My COPD Action Plan     Name: Erich Craven    YOB: 1940   Date: 3/9/2021    My doctor: Kassi Glez [facility]   My clinic: [address] [address] [phone]  My Controller Medicine: None     My Rescue Medicine: Albuterol (Proair/Ventolin/Proventil) inhaler   Dose: 1-2 puffs or 1 vial via nebulizer every 4-6 hours as needed           Use of Oxygen: Oxygen Not Prescribed      Make sure you've had your pneumonia   vaccines.          GREEN ZONE       Doing well today      Usual level of activity and exercise    Usual amount of cough and mucus    No shortness of breath    Usual level of health (thinking clearly, sleeping well, feel like eating) Actions:      Take daily medicines    Use oxygen as prescribed    Follow regular exercise and diet plan    Avoid cigarette smoke and other irritants that harm the lungs           YELLOW ZONE          Having a bad day or flare up      Short of breath more than usual    A lot more sputum (mucus) than usual    Sputum looks yellow, green, tan, brown or bloody    More coughing or wheezing    Fever or chills    Less energy; trouble completing activities    Trouble thinking or focusing    Using quick relief inhaler or nebulizer more often    Poor sleep; symptoms wake me up    Do not feel like eating Actions:      Get plenty of rest    Take daily medicines    Use quick relief inhaler every 4-6 hours    If you use oxygen, call you doctor to see if you should adjust your oxygen    Do breathing exercises or other things to help you relax    Let a loved one, friend or neighbor know you are feeling worse    Call your care team if you have 2 or more symptoms.  Start taking steroids or antibiotics if directed by your care team           RED ZONE       Need medical care now      Severe shortness of breath (feel you can't breathe)    Fever, chills    Not enough breath to do any activity    Trouble coughing up  mucus, walking or talking    Blood in mucus    Frequent coughing   Rescue medicines are not working    Not able to sleep because of breathing    Feel confused or drowsy    Chest pain    Actions:      Call your health care team.  If you cannot reach your care team, call 911 or go to the emergency room.        Annual Reminders:  Meet with Care Team, Flu Shot every Fall  Pharmacy: [id number] [place], [place] - [address]

## 2021-03-09 NOTE — PROGRESS NOTES
Medication Therapy Management (MTM) Encounter    ASSESSMENT:                            Medication Adherence/Access: No issues identified    COPD Exacerbation: May need further evaluation if symptoms don't start improving soon.  His side effects are likely from prednisone, but may benefit from monitoring blood pressure at home given headache to ensure blood pressure isn't elevated.  He should let us know if it is.    COPD: It sounds like his COPD is normally very stable, will need to continue to monitor.  May need to escalate therapy if he continues having increased symptoms/exacerbations.    Shoulder Pain: Plan in place.    Type 2 Diabetes: Stable.     Hypertension/A. Fib/HFpEF/H/o PE/DVT/CAD:  Recent weight gain is likely from prednisone use.  He needed additional education that metolazone should be taken about 30 minutes prior to furosemide.  If changing this administration in the next day does not result in some weight gain, he would benefit from touching base with cardiology since spironolactone was also recently discontinued.    Hyperlipidemia: Stable.     Anxiety/Depression:  Venlafaxine is often activating, which can heighten anxiety symptoms.  Would prefer to gradually reduce/discontinue benzodiazepine use.  Perhaps changing venlafaxine to an selective serotonin reuptake inhibitor may provide better anxiety control, which may allow us to taper off benzodiazepine.    Hypothyroidism: Stable. Last TSH is within normal limits.      BPH:  Patient denies any current or past urinary symptoms.  May benefit from trial off tamsulosin.    Supplements:  Stable.  It's questionable how much benefit he's getting from his supplements, but he finds them effective and they likely aren't harmful.    Immunizations:  Stable.    PLAN:                            1.  Recommended checking blood pressure at home given prednisone use and headaches.  He'll notify us if blood pressure is >140/90mmHg.  2.  COPD action plan completed and  sent to patient via LiveRail  3.  Start sertraline 25mg daily and reduce venlafaxine to 37.5mg ONCE daily.  After 2 weeks, stop venlafaxine and increase sertraline to 50mg daily.  Will ultimately aim to taper down on clonazepam as able.  4.  Recommended taking metolazone about 30 minutes prior to furosemide.  Patient will call cardiology if he doesn't see some weight loss in the next day with this change.  5.  Stopped tamsulosin - patient will monitor sx    Follow-up: Will check in via LiveRail in about 2 weeks    SUBJECTIVE/OBJECTIVE:                          Erich Craven is a 80 year old male contacted via secure video for an initial visit. He was referred to me from Dr. Heath.      Reason for visit: Comprehensive medication review.  Has some questions about  COPD exacerbation that he's currently being treated for.    Allergies/ADRs: Reviewed in chart  Tobacco: He reports that he has quit smoking. He has a 12.50 pack-year smoking history. He has never used smokeless tobacco.  Alcohol: history of alcohol dependence - sober since 1989  Caffeine: 1 can of diet pepsi/day on average  Activity: He got a puppy in September, which he's enjoyed and they've been getting activity together (walking)    Past Medical History: Reviewed in chart    Social history:  Patient moved to [place] from [place] about 2 years ago - he was seeing a naturopath in [place] and prefers to continue many of those supplements    Medication Adherence/Access: Patient uses pill box(es) in the AM and takes PM medications directly from the bottles.  Patient takes medications 2 time(s) per day.  Per patient, misses medication 0 times per week.     COPD Exacerbation:  Patient was seen at urgent care on 3/4.  He was prescribed prednisone 40mg x 7 days - since starting he's had a headache, polyuria, and weight gain and wonders if he should continue.  He was not given antibiotics.  COVID test was negative, patient is fully vaccinated.    COPD: Current medications:  albuterol MDI/nebs as needed.  He generally does not use albuterol MDI much, maybe once a week or so.  He's needed to use albuterol MDI more recently (about 3x) and used nebulizer once yesterday.  He does use O2 in his PAP device overnight.  He was on Combivent in the past, but this became too expensive for him so he went to albuterol only   Patient is not experiencing side effects.   Patient reports the following symptoms: increased wheezing, particularly at night.  He has been monitoring his O2 levels and reports it's been stable.  Patient does not have an COPD Action Plan on file.   Has spirometry been completed: Yes     Shoulder Pain:  He's scheduled for surgery on 3/23.  He's been taking acetaminophen 1000mg four times daily, which he reports is effective.  He denies side effects of therapy.  He's aware of maximum dose of acetaminophen/day and stays at/below that.    Type 2 Diabetes:  Currently taking no medications for diabetes management.   Symptoms of low blood sugar? none  Symptoms of high blood sugar? polyuria  Eye exam: due  Foot exam: due  Diet/Exercise: He is a vegetarian  Aspirin: Taking 81mg daily and denies side effects  Statin: Yes: atorvastatin 40mg   ACEi/ARB: Yes: lisinopril 40mg.   Urine Albumin:   Lab Results   Component Value Date    UMALCR 27.79 (H) 02/19/2021      Lab Results   Component Value Date    A1C 5.8 02/19/2021    A1C 6.7 08/11/2020    A1C 5.8 02/27/2019    A1C 6.6 08/07/2018     Hypertension/A. Fib/HFpEF/H/o PE/DVT/CAD: Current medications include aspirin 81mg daily, warfarin as directed, carvedilol 12.5mg twice daily, diltiazem ER 300mg daily, furosemide 40mg twice daily, Imdur 30mg daily, lisinopril 40mg daily, metolazone 2.5mg twice daily (takes after furosemide)  Patient does not self-monitor blood pressure.  Patient reports no current medication side effects including signs and symptoms bruising/bleeding.  He was taken off spironolactone at last primary care physician visit  (2/19).  Last EF 2/27/19 - 66%  He does monitor his weight at home - it has increased with prednisone use.  Baseline is about 282 lbs, he's currently at 288 lbs.  He denies any signs and symptoms of edema.    BP Readings from Last 3 Encounters:   03/04/21 103/65   02/26/21 102/58   02/23/21 123/74      INR   Date Value Ref Range Status   02/19/2021 2.90 (H) 0.86 - 1.14 Final     Comment:     This test is intended for monitoring Coumadin therapy.  Results are not   accurate in patients with prolonged INR due to factor deficiency.       Potassium   Date Value Ref Range Status   03/02/2021 4.5 3.4 - 5.3 mmol/L Final        Hyperlipidemia: Current therapy includes atorvastatin 40mg daily.  Patient reports no significant myalgias or other side effects.  The ASCVD Risk score ([place] et al., 2013) failed to calculate for the following reasons:    The 2013 ASCVD risk score is only valid for ages 40 to 79  Recent Labs   Lab Test 02/19/21  1528 08/11/20  1343   CHOL 138 131   HDL 44 44   LDL 52 56   TRIG 209* 154*     Anxiety/Depression:  Current regimens include clonazepam 0.5mg twice daily as needed (currently taking 1.5 tablets daily) and venlafaxine 37.5mg twice daily.  He reports being very cautious with clonazepam use - when he's tried reducing this in the past, he's noticed heightened anxiety.  He feels his mental health has been stable for the last 10 years or so.  He denies suicidal ideation.    Hypothyroidism: Patient is taking levothyroxine 175 mcg daily and an additional 1/2 tablet on Sundays. Patient is having the following symptoms: none.   TSH   Date Value Ref Range Status   08/11/2020 0.79 0.40 - 4.00 mU/L Final      BPH:  Patient is taking tamsulosin 0.4mg daily - he denies any issues or h/o enlarged prostate or urinary conccerns.  Dx in chart is BPH with urinary obstruction    Supplements:  Current supplements include acetyl l-carnitine 500mg twice daily, CoQ10 100mg daily, a daily multivitamin, and  taurine 500mg twice daily.  He finds these effective and prefers to continue taking.  No side effects noted.    Immunizations:  Patient has received both COVID-19 vaccine doses (Pfizer) - 1/28/21 and 2/18/21.    Today's Vitals: There were no vitals taken for this visit.  ----------------    I spent 69 minutes with this patient today. All changes were made via collaborative practice agreement with Dr. Heath. A copy of the visit note was provided to the patient's primary care provider.    The patient was sent via Zipano a summary of these recommendations.     Kassi Glez, PharmD, [facility]  Medication Therapy Management Provider  Pager: [phone]     Telemedicine Visit Details  Type of service:  Video Conference via Myows  Start Time: 2:04 PM  End Time: 3:13 PM  Originating Location (patient location): Home  Distant Location (provider location):  [facility]      Medication Therapy Recommendations  (HFpEF) heart failure with preserved ejection fraction (H)    Current Medication: metolazone (ZAROXOLYN) 2.5 MG tablet   Rationale: Incorrect administration - Dosage too low - Effectiveness   Recommendation: Change Administration Time - Recommended changing metolazone to take 30 minutes prior to furosemide   Status: Patient Agreed - Adherence/Education         Anxiety    Current Medication: venlafaxine (EFFEXOR) 37.5 MG tablet   Rationale: Undesirable effect - Adverse medication event - Safety   Recommendation: Change Medication - sertraline 25 MG tablet - cross taper from venlafaxine to sertraline   Status: Accepted per CPA         Benign prostatic hyperplasia without lower urinary tract symptoms    Rationale: Nonmedication therapy more appropriate - Unnecessary medication therapy - Indication   Recommendation: Discontinue Medication - Discontinued tamsulosin   Status: Accepted per CPA

## 2021-03-11 ENCOUNTER — VIRTUAL VISIT (OUTPATIENT)
Dept: FAMILY MEDICINE | Facility: CLINIC | Age: 81
End: 2021-03-11
Payer: MEDICARE

## 2021-03-11 ENCOUNTER — MYC MEDICAL ADVICE (OUTPATIENT)
Dept: ORTHOPEDICS | Facility: CLINIC | Age: 81
End: 2021-03-11

## 2021-03-11 ENCOUNTER — TELEPHONE (OUTPATIENT)
Dept: CARDIOLOGY | Facility: CLINIC | Age: 81
End: 2021-03-11

## 2021-03-11 ENCOUNTER — ANCILLARY PROCEDURE (OUTPATIENT)
Dept: CARDIOLOGY | Facility: CLINIC | Age: 81
End: 2021-03-11
Attending: INTERNAL MEDICINE
Payer: MEDICARE

## 2021-03-11 DIAGNOSIS — Z95.0 CARDIAC PACEMAKER IN SITU: ICD-10-CM

## 2021-03-11 DIAGNOSIS — J06.9 URI WITH COUGH AND CONGESTION: Primary | ICD-10-CM

## 2021-03-11 PROCEDURE — 99213 OFFICE O/P EST LOW 20 MIN: CPT | Mod: 95 | Performed by: NURSE PRACTITIONER

## 2021-03-11 PROCEDURE — 93294 REM INTERROG EVL PM/LDLS PM: CPT | Performed by: INTERNAL MEDICINE

## 2021-03-11 PROCEDURE — 93296 REM INTERROG EVL PM/IDS: CPT | Performed by: INTERNAL MEDICINE

## 2021-03-11 RX ORDER — AZITHROMYCIN 250 MG/1
TABLET, FILM COATED ORAL
Qty: 6 TABLET | Refills: 0 | Status: SHIPPED | OUTPATIENT
Start: 2021-03-11 | End: 2021-03-16

## 2021-03-11 NOTE — TELEPHONE ENCOUNTER
Pt called to report that he was seen on 3/4/21 for a respiratory infection.   Pt was then started on prednisone 40mg daily for 7 days.   Pt reports that he has now stopped the prednisone but has noticed a 6 - 7 lbs weight gain.   Pt reports his blood pressure cuff at home is not working well and he is looking into getting a new one.   His BP at urgent care last week was 103/65 HR 72.  Informed pt that the weight gain could be because of the prednisone, but this will be reviewed by Destiny to see if she has any recommendations.   Informed pt he should get a new blood pressure cuff to monitor his BP throughout the weekend.     When Destiny last saw pt (2/23/21), his PCP had recently stopped his spironolactone.

## 2021-03-11 NOTE — PROGRESS NOTES
Jaun is a 80 year old who is being evaluated via a billable video visit.      How would you like to obtain your AVS? MyChart  If the video visit is dropped, the invitation should be resent by: Text to cell phone: [phone]  Will anyone else be joining your video visit? No      Video Start Time: 10:33 AM    Assessment & Plan   Problem List Items Addressed This Visit     None      Visit Diagnoses     URI with cough and congestion    -  Primary    Relevant Medications    azithromycin (ZITHROMAX) 250 MG tablet           Persistent cough. Neg COVID and CXR last week. Had steroids without much improvement. Suspect this is viral. Recommend he continue to watch and wait. I sent abx that he can start if he gets worse or is not any better next week. This is a safer option with his coumadin. He is paced and QTc is within normal    VAMSI Lincoln CNP  [facility]    Subjective   Jaun is a 80 year old who presents for the following health issues     HPI       Acute Illness  Acute illness concerns: cough  Onset/Duration: 10 days  Symptoms:  Fever: no  Chills/Sweats: YES  Headache (location?): YES  Sinus Pressure: no  Conjunctivitis:  no  Ear Pain: no  Rhinorrhea: no  Congestion: YES  Sore Throat: YES  Cough: YES-non-productive  Wheeze: YES  Decreased Appetite: YES  Nausea: no  Vomiting: no  Diarrhea: YES  Dysuria/Freq.: YES  Dysuria or Hematuria: no  Fatigue/Achiness: YES  Sick/Strep Exposure: no  Therapies tried and outcome: Steroids     Nonproductive cough, chest congestion, fatigue. Only has a small amount of sputum in the morning.   Was seen in UC and was given prednisone for 7 days. Didn't take it today because he has a headache. CXR was negative.    Has had weight gain of about 6 lbs this last week   Has had some night sweats and occasional chills   Sore throat  No nasal drainage         Review of Systems   Detailed as above       Objective           Vitals:  No vitals were obtained today  due to virtual visit.    Physical Exam   GENERAL: Healthy, alert and no distress  EYES: Eyes grossly normal to inspection.  No discharge or erythema, or obvious scleral/conjunctival abnormalities.  RESP: No audible wheeze, cough, or visible cyanosis.  No visible retractions or increased work of breathing.    SKIN: Visible skin clear. No significant rash, abnormal pigmentation or lesions.  NEURO: Cranial nerves grossly intact.  Mentation and speech appropriate for age.  PSYCH: Mentation appears normal, affect normal/bright, judgement and insight intact, normal speech and appearance well-groomed.            Video-Visit Details    Type of service:  Video Visit    Video End Time:10:48 AM    Originating Location (pt. Location): Home    Distant Location (provider location):  [facility]     Platform used for Video Visit: PeteyAgendize

## 2021-03-12 ENCOUNTER — TELEPHONE (OUTPATIENT)
Dept: ORTHOPEDICS | Facility: CLINIC | Age: 81
End: 2021-03-12

## 2021-03-12 NOTE — TELEPHONE ENCOUNTER
3:11pm on 3/12/2021.  Patient needs some help with getting some health records.  He is coming in this afternoon.  Please call him at [phone].

## 2021-03-12 NOTE — TELEPHONE ENCOUNTER
Patient called to cancel his surgery with Dr. Houston on 3/28/2021.  He states he is having family troubles and due to location he is going to see a different surgeon.    Called [facility] and cancelled surgery for 3/28/21    Shavonne Tejeda ATC    3/12/2021 at 3:37 PM

## 2021-03-12 NOTE — TELEPHONE ENCOUNTER
Reviewed phone encounter 6-7# weight gain with complaints of cough/congestion and some shortness of breath. Was treated for respiratory infection with prednisone for 7 days which he is now off of.     Recommend taking an additional 40mg furosemide x 3 days.    VAMSI Mckeon [facility]  Pager: [phone]

## 2021-03-12 NOTE — PATIENT INSTRUCTIONS
Recommendations from today's MTM visit:                                                    MTM (medication therapy management) is a service provided by a clinical pharmacist designed to help you get the most of out of your medicines.   Today we reviewed what your medicines are for, how to know if they are working, that your medicines are safe and how to make your medicine regimen as easy as possible.      1.  Please check your blood pressure at home given the headaches.  Let us know if this is over 140/90mmHg.    2.  Please start sertraline 25mg once daily and reduce venlafaxine to 37.5mg once daily.  After 2 weeks, stop venlafaxine and increase sertraline to 50mg daily.    3.  Please start taking metolazone about 30 minutes prior to furosemide.  If you don't see any weight loss in the next day, please call cardiology.    4.  You can stop taking tamsulosin (Flomax).  We will monitor for urinary symptoms.    It was great to speak with you today.  I value your experience and would be very thankful for your time with providing feedback on our clinic survey. You may receive a survey via email or text message in the next few days.     Next MTM visit: I'll plan to check in via NatSentt in about 2 weeks, but let me know if something comes up sooner    To schedule another MTM appointment, please call the clinic directly or you may call the MTM scheduling line at [phone] or toll-free at [phone].     My Clinical Pharmacist's contact information:                                                      It was a pleasure talking with you today!  Please feel free to contact me with any questions or concerns you have.      Kassi Glez PharmD, [facility]  Medication Therapy Management Provider  Pager: [phone]     Whit Jackson PharmD  Medication Therapy Management Resident  Pager: [phone]

## 2021-03-12 NOTE — TELEPHONE ENCOUNTER
Called back and spoke with pt, reviewed Destiny's recommendation to take an additional 40 mg of furosemide x 3 days. Requested pt call back next week if symptoms have not improved or weight has not decreased. Pt verbalized understanding and agreement with plan.

## 2021-03-15 ENCOUNTER — DOCUMENTATION ONLY (OUTPATIENT)
Dept: FAMILY MEDICINE | Facility: CLINIC | Age: 81
End: 2021-03-15

## 2021-03-15 LAB
MDC_IDC_EPISODE_DTM: NORMAL
MDC_IDC_EPISODE_DURATION: 11 S
MDC_IDC_EPISODE_DURATION: 6 S
MDC_IDC_EPISODE_DURATION: 7 S
MDC_IDC_EPISODE_DURATION: 9 S
MDC_IDC_EPISODE_ID: NORMAL
MDC_IDC_EPISODE_TYPE: NORMAL
MDC_IDC_LEAD_IMPLANT_DT: NORMAL
MDC_IDC_LEAD_IMPLANT_DT: NORMAL
MDC_IDC_LEAD_LOCATION: NORMAL
MDC_IDC_LEAD_LOCATION: NORMAL
MDC_IDC_LEAD_LOCATION_DETAIL_1: NORMAL
MDC_IDC_LEAD_LOCATION_DETAIL_1: NORMAL
MDC_IDC_LEAD_MFG: NORMAL
MDC_IDC_LEAD_MFG: NORMAL
MDC_IDC_LEAD_MODEL: NORMAL
MDC_IDC_LEAD_MODEL: NORMAL
MDC_IDC_LEAD_POLARITY_TYPE: NORMAL
MDC_IDC_LEAD_POLARITY_TYPE: NORMAL
MDC_IDC_LEAD_SERIAL: NORMAL
MDC_IDC_LEAD_SERIAL: NORMAL
MDC_IDC_MSMT_BATTERY_DTM: NORMAL
MDC_IDC_MSMT_BATTERY_REMAINING_LONGEVITY: 36 MO
MDC_IDC_MSMT_BATTERY_REMAINING_PERCENTAGE: 56 %
MDC_IDC_MSMT_BATTERY_STATUS: NORMAL
MDC_IDC_MSMT_LEADCHNL_RA_IMPEDANCE_VALUE: 468 OHM
MDC_IDC_MSMT_LEADCHNL_RA_PACING_THRESHOLD_AMPLITUDE: 0.4 V
MDC_IDC_MSMT_LEADCHNL_RA_PACING_THRESHOLD_PULSEWIDTH: 0.4 MS
MDC_IDC_MSMT_LEADCHNL_RV_IMPEDANCE_VALUE: 387 OHM
MDC_IDC_MSMT_LEADCHNL_RV_PACING_THRESHOLD_AMPLITUDE: 1.7 V
MDC_IDC_MSMT_LEADCHNL_RV_PACING_THRESHOLD_PULSEWIDTH: 0.4 MS
MDC_IDC_PG_IMPLANT_DTM: NORMAL
MDC_IDC_PG_MFG: NORMAL
MDC_IDC_PG_MODEL: NORMAL
MDC_IDC_PG_SERIAL: NORMAL
MDC_IDC_PG_TYPE: NORMAL
MDC_IDC_SESS_CLINIC_NAME: NORMAL
MDC_IDC_SESS_DTM: NORMAL
MDC_IDC_SESS_TYPE: NORMAL
MDC_IDC_SET_BRADY_AT_MODE_SWITCH_MODE: NORMAL
MDC_IDC_SET_BRADY_AT_MODE_SWITCH_RATE: 150 {BEATS}/MIN
MDC_IDC_SET_BRADY_LOWRATE: 60 {BEATS}/MIN
MDC_IDC_SET_BRADY_MAX_SENSOR_RATE: 130 {BEATS}/MIN
MDC_IDC_SET_BRADY_MAX_TRACKING_RATE: 130 {BEATS}/MIN
MDC_IDC_SET_BRADY_MODE: NORMAL
MDC_IDC_SET_BRADY_PAV_DELAY_HIGH: 220 MS
MDC_IDC_SET_BRADY_PAV_DELAY_LOW: 250 MS
MDC_IDC_SET_BRADY_SAV_DELAY_HIGH: 220 MS
MDC_IDC_SET_BRADY_SAV_DELAY_LOW: 250 MS
MDC_IDC_SET_LEADCHNL_RA_PACING_AMPLITUDE: 2 V
MDC_IDC_SET_LEADCHNL_RA_PACING_POLARITY: NORMAL
MDC_IDC_SET_LEADCHNL_RA_PACING_PULSEWIDTH: 0.4 MS
MDC_IDC_SET_LEADCHNL_RA_SENSING_ADAPTATION_MODE: NORMAL
MDC_IDC_SET_LEADCHNL_RA_SENSING_POLARITY: NORMAL
MDC_IDC_SET_LEADCHNL_RA_SENSING_SENSITIVITY: 0.5 MV
MDC_IDC_SET_LEADCHNL_RV_PACING_AMPLITUDE: 2 V
MDC_IDC_SET_LEADCHNL_RV_PACING_CAPTURE_MODE: NORMAL
MDC_IDC_SET_LEADCHNL_RV_PACING_POLARITY: NORMAL
MDC_IDC_SET_LEADCHNL_RV_PACING_PULSEWIDTH: 0.4 MS
MDC_IDC_SET_LEADCHNL_RV_SENSING_ADAPTATION_MODE: NORMAL
MDC_IDC_SET_LEADCHNL_RV_SENSING_POLARITY: NORMAL
MDC_IDC_SET_LEADCHNL_RV_SENSING_SENSITIVITY: 1 MV
MDC_IDC_SET_ZONE_DETECTION_INTERVAL: 375 MS
MDC_IDC_SET_ZONE_TYPE: NORMAL
MDC_IDC_SET_ZONE_VENDOR_TYPE: NORMAL
MDC_IDC_STAT_AT_BURDEN_PERCENT: 1 %
MDC_IDC_STAT_AT_DTM_END: NORMAL
MDC_IDC_STAT_AT_DTM_START: NORMAL
MDC_IDC_STAT_BRADY_DTM_END: NORMAL
MDC_IDC_STAT_BRADY_DTM_START: NORMAL
MDC_IDC_STAT_BRADY_RA_PERCENT_PACED: 11 %
MDC_IDC_STAT_BRADY_RV_PERCENT_PACED: 0 %
MDC_IDC_STAT_EPISODE_RECENT_COUNT: 0
MDC_IDC_STAT_EPISODE_RECENT_COUNT: 0
MDC_IDC_STAT_EPISODE_RECENT_COUNT: 11
MDC_IDC_STAT_EPISODE_RECENT_COUNT: 5
MDC_IDC_STAT_EPISODE_RECENT_COUNT_DTM_END: NORMAL
MDC_IDC_STAT_EPISODE_RECENT_COUNT_DTM_START: NORMAL
MDC_IDC_STAT_EPISODE_TYPE: NORMAL
MDC_IDC_STAT_EPISODE_VENDOR_TYPE: NORMAL

## 2021-03-15 NOTE — TELEPHONE ENCOUNTER
Phone call to patient. He states he was able to get assistance and was appreciative of the return call.     VICTOR HUGO Muro RN

## 2021-03-15 NOTE — TELEPHONE ENCOUNTER
Patient called on 3/12/21, spoke to Shavonne and cancelled the 3/23/21 surgery.     Closing encounter.       Deb Navas, Surgery Scheduler

## 2021-03-15 NOTE — PROGRESS NOTES
ANTICOAGULATION  MANAGEMENT     Interacting Medication Review    Interacting medication(s): Azithromycin (Zithromax, Z-brady) with warfarin.    Duration: 5 days (3/12 to 3/17)    Indication: URI     New medication?: Yes, interaction may increase INR and risk of bleeding       PLAN     Continue current warfarin dose. Recommend to check INR on 3/19    Spoke with Erich    No adjustment to Anticoagulation Calendar was required    Evita Lloyd RN

## 2021-03-16 ENCOUNTER — NURSE TRIAGE (OUTPATIENT)
Dept: NURSING | Facility: CLINIC | Age: 81
End: 2021-03-16

## 2021-03-16 NOTE — TELEPHONE ENCOUNTER
Seen at  3/4, Virtual visit 3/11 for URI. 3/11 note states likely viral. 3/4 Covid PCR neg. Pt did start the abx he was prescribed on 3/11. On 4th day of abx today. C/o not getting better but not worse either. Coughing spells, producing yellow sputum. Mild SOB. Hx COPD. Pt states nebs help SOB, usually coughs up phlegm after neb. O2 Sat 90% Denies any severe SOB or chest discomfort. Denies fever. Denies sinus or ear pain. Disc'd that viral URI can take a long time to improve. Abx won't help viral but continue abx as prescribed per provider. Call back if sx worse, fever, increased SOB, etc.  Advised home care.     Additional Information    Negative: Severe difficulty breathing (e.g., struggling for each breath, speaks in single words)    Negative: Bluish (or gray) lips or face now    Negative: [1] Difficulty breathing AND [2] exposure to flames, smoke, or fumes    Negative: [1] Stridor AND [2] difficulty breathing    Negative: Sounds like a life-threatening emergency to the triager    Negative: [1] Previous asthma attacks AND [2] this feels like asthma attack    Negative: Dry (non-productive) cough (i.e., no sputum or minimal clear sputum)    Negative: Chest pain  (Exception: MILD central chest pain, present only when coughing)    Negative: Difficulty breathing    Negative: Patient sounds very sick or weak to the triager    Negative: [1] Coughed up blood AND [2] > 1 tablespoon (15 ml) (Exception: blood-tinged sputum)    Negative: Fever > 103 F (39.4 C)    Negative: [1] Fever > 101 F (38.3 C) AND [2] age > 60    Negative: [1] Fever > 100.0 F (37.8 C) AND [2] bedridden (e.g., nursing home patient, CVA, chronic illness, recovering from surgery)    Negative: [1] Fever > 100.0 F (37.8 C) AND [2] diabetes mellitus or weak immune system (e.g., HIV positive, cancer chemo, splenectomy, organ transplant, chronic steroids)    Negative: Wheezing is present    Negative: SEVERE coughing spells (e.g., whooping sound after  coughing, vomiting after coughing)    Negative: [1] Continuous (nonstop) coughing interferes with work or school AND [2] no improvement using cough treatment per Care Advice    Negative: Coughing up nick-colored (reddish-brown) sputum    Negative: Fever present > 3 days (72 hours)    Negative: [1] Fever returns after gone for over 24 hours AND [2] symptoms worse or not improved    Negative: [1] Using nasal washes and pain medicine > 24 hours AND [2] sinus pain (around cheekbone or eye) persists    Negative: Earache    Negative: [1] Known COPD or other severe lung disease (i.e., bronchiectasis, cystic fibrosis, lung surgery) AND [2] worsening symptoms (i.e., increased sputum purulence or amount, increased breathing difficulty    Negative: Cough has been present for > 3 weeks    Negative: [1] Nasal discharge AND [2] present > 10 days    Negative: [1] Coughed up blood-tinged sputum AND [2] more than once    Negative: Exposure to TB (Tuberculosis)    Cough    Protocols used: COUGH - ACUTE [id number]

## 2021-03-24 ENCOUNTER — ANCILLARY PROCEDURE (OUTPATIENT)
Dept: GENERAL RADIOLOGY | Facility: CLINIC | Age: 81
End: 2021-03-24
Attending: NURSE PRACTITIONER
Payer: MEDICARE

## 2021-03-24 ENCOUNTER — OFFICE VISIT (OUTPATIENT)
Dept: URGENT CARE | Facility: URGENT CARE | Age: 81
End: 2021-03-24
Payer: MEDICARE

## 2021-03-24 VITALS
SYSTOLIC BLOOD PRESSURE: 106 MMHG | TEMPERATURE: 98.3 F | WEIGHT: 295 LBS | OXYGEN SATURATION: 92 % | HEART RATE: 61 BPM | DIASTOLIC BLOOD PRESSURE: 68 MMHG | BODY MASS INDEX: 40 KG/M2

## 2021-03-24 DIAGNOSIS — J44.9 CHRONIC OBSTRUCTIVE PULMONARY DISEASE, UNSPECIFIED COPD TYPE (H): ICD-10-CM

## 2021-03-24 DIAGNOSIS — E66.01 MORBID OBESITY (H): ICD-10-CM

## 2021-03-24 DIAGNOSIS — R05.9 COUGH: Primary | ICD-10-CM

## 2021-03-24 DIAGNOSIS — M79.10 MYALGIA: ICD-10-CM

## 2021-03-24 LAB
BASOPHILS # BLD AUTO: 0.1 10E9/L (ref 0–0.2)
BASOPHILS NFR BLD AUTO: 0.6 %
DIFFERENTIAL METHOD BLD: ABNORMAL
EOSINOPHIL # BLD AUTO: 0.5 10E9/L (ref 0–0.7)
EOSINOPHIL NFR BLD AUTO: 5.2 %
ERYTHROCYTE [DISTWIDTH] IN BLOOD BY AUTOMATED COUNT: 15.8 % (ref 10–15)
HCT VFR BLD AUTO: 37.3 % (ref 40–53)
HGB BLD-MCNC: 11.9 G/DL (ref 13.3–17.7)
LYMPHOCYTES # BLD AUTO: 2.5 10E9/L (ref 0.8–5.3)
LYMPHOCYTES NFR BLD AUTO: 23.8 %
MCH RBC QN AUTO: 32.6 PG (ref 26.5–33)
MCHC RBC AUTO-ENTMCNC: 31.9 G/DL (ref 31.5–36.5)
MCV RBC AUTO: 102 FL (ref 78–100)
MONOCYTES # BLD AUTO: 0.9 10E9/L (ref 0–1.3)
MONOCYTES NFR BLD AUTO: 8.4 %
NEUTROPHILS # BLD AUTO: 6.5 10E9/L (ref 1.6–8.3)
NEUTROPHILS NFR BLD AUTO: 62 %
PLATELET # BLD AUTO: 208 10E9/L (ref 150–450)
RBC # BLD AUTO: 3.65 10E12/L (ref 4.4–5.9)
SARS-COV-2 RNA RESP QL NAA+PROBE: NORMAL
SPECIMEN SOURCE: NORMAL
WBC # BLD AUTO: 10.4 10E9/L (ref 4–11)

## 2021-03-24 PROCEDURE — 36415 COLL VENOUS BLD VENIPUNCTURE: CPT | Performed by: NURSE PRACTITIONER

## 2021-03-24 PROCEDURE — U0005 INFEC AGEN DETEC AMPLI PROBE: HCPCS | Performed by: NURSE PRACTITIONER

## 2021-03-24 PROCEDURE — 71046 X-RAY EXAM CHEST 2 VIEWS: CPT | Performed by: RADIOLOGY

## 2021-03-24 PROCEDURE — U0003 INFECTIOUS AGENT DETECTION BY NUCLEIC ACID (DNA OR RNA); SEVERE ACUTE RESPIRATORY SYNDROME CORONAVIRUS 2 (SARS-COV-2) (CORONAVIRUS DISEASE [COVID-19]), AMPLIFIED PROBE TECHNIQUE, MAKING USE OF HIGH THROUGHPUT TECHNOLOGIES AS DESCRIBED BY CMS-2020-01-R: HCPCS | Performed by: NURSE PRACTITIONER

## 2021-03-24 PROCEDURE — 85025 COMPLETE CBC W/AUTO DIFF WBC: CPT | Performed by: NURSE PRACTITIONER

## 2021-03-24 PROCEDURE — 99214 OFFICE O/P EST MOD 30 MIN: CPT | Performed by: NURSE PRACTITIONER

## 2021-03-24 NOTE — PROGRESS NOTES
"    Assessment & Plan     Cough  Chest film negative for pneumonia, heart is normal size.  CBC with normal WBC's and HGB unchanged from last lab draw.   Suspect he could have COVID despite having completed vaccine series 6 weeks ago. Will have him see his PCP on Friday for recheck on his symtpoms unless his COVID test is positive. If COVID is positive, recommend he stay home and would receive further instructions from nursing staff who call results.   - XR Chest 2 Views  - CBC with platelets and differential  - Symptomatic COVID-19 Virus (Coronavirus) by PCR- will run this stat    Myalgia    - CBC with platelets and differential  - Symptomatic COVID-19 Virus (Coronavirus) by PCR    Chronic obstructive pulmonary disease, unspecified COPD type (H)    - XR Chest 2 Views  - Symptomatic COVID-19 Virus (Coronavirus) by PCR    Morbid obesity (H)    - XR Chest 2 Views  - Symptomatic COVID-19 Virus (Coronavirus) by PCR      30 minutes spent on the date of the encounter doing chart review, review of test results, interpretation of tests, patient visit and documentation        BMI:   Estimated body mass index is 40 kg/m  as calculated from the following:    Height as of 2/26/21: 1.829 m (6' 0.01\").    Weight as of this encounter: 133.8 kg (295 lb).       Return for Follow up on Friday at 4pm with your doctor.    Sherin Das, [facility] HERNAN Zamorano is a 80 year old who presents for the following health issues     HPI     Acute Illness  Acute illness concerns: dry cough, SOB, fatigue and myalgia  Onset/Duration: 3 weeks  Symptoms:  Fever: no  Chills/Sweats: YES  Headache (location?): no  Sinus Pressure: no  Conjunctivitis:  no  Ear Pain: no  Rhinorrhea: no  Congestion: YES  Sore Throat: YES  Cough: YES-non-productive  Wheeze: YES  Decreased Appetite: YES  Nausea: no  Vomiting: no  Diarrhea: no  Fatigue/Achiness: YES  Sick/Strep Exposure: no  Therapies tried and outcome: Zpack and " prednisone earlier this month. Had a negative COVID test 3/4/2021  Is to have Rotator cuff surgery early April   Has had COVID vaccine completion in February 2021      Review of Systems   Constitutional, HEENT, cardiovascular, pulmonary, gi and gu systems are negative, except as otherwise noted.      Objective    /68 (BP Location: Right arm, Patient Position: Sitting, Cuff Size: Adult Large)   Pulse 61   Temp 98.3  F (36.8  C) (Oral)   Wt 133.8 kg (295 lb)   SpO2 92%   BMI 40.00 kg/m    Body mass index is 40 kg/m .  Physical Exam   GENERAL: healthy, alert and no distress  EYES: Eyes grossly normal to inspection, PERRL and conjunctivae and sclerae normal  HENT: ear canals and TM's normal, nose and mouth without ulcers or lesions  NECK: no adenopathy, no asymmetry, masses, or scars and thyroid normal to palpation  RESP: decreased bases bilaterally otherwise clear.    CV: regular rate and rhythm, normal S1 S2, no S3 or S4, no murmur, click or rub, no peripheral edema and peripheral pulses strong  SKIN: no suspicious lesions or rashes  NEURO: Normal strength and tone, mentation intact and speech normal  LYMPH: no cervical, supraclavicular, axillary, or inguinal adenopathy    Results for orders placed or performed in visit on 03/24/21 (from the past 24 hour(s))   CBC with platelets and differential   Result Value Ref Range    WBC 10.4 4.0 - 11.0 10e9/L    RBC Count 3.65 (L) 4.4 - 5.9 10e12/L    Hemoglobin 11.9 (L) 13.3 - 17.7 g/dL    Hematocrit 37.3 (L) 40.0 - 53.0 %     (H) 78 - 100 fl    MCH 32.6 26.5 - 33.0 pg    MCHC 31.9 31.5 - 36.5 g/dL    RDW 15.8 (H) 10.0 - 15.0 %    Platelet Count 208 150 - 450 10e9/L    % Neutrophils 62.0 %    % Lymphocytes 23.8 %    % Monocytes 8.4 %    % Eosinophils 5.2 %    % Basophils 0.6 %    Absolute Neutrophil 6.5 1.6 - 8.3 10e9/L    Absolute Lymphocytes 2.5 0.8 - 5.3 10e9/L    Absolute Monocytes 0.9 0.0 - 1.3 10e9/L    Absolute Eosinophils 0.5 0.0 - 0.7 10e9/L     Absolute Basophils 0.1 0.0 - 0.2 10e9/L    Diff Method Automated Method    XR Chest 2 Views    Narrative    XR CHEST 2 VW 3/24/2021 6:14 PM    HISTORY: Cough; Chronic obstructive pulmonary disease, unspecified  COPD type (H); Morbid obesity (H)    COMPARISON: 3/4/2021      Impression    IMPRESSION: Pacemaker. No focal infiltrate, pleural effusion or  pneumothorax. Normal heart size. Tortuous aorta with atherosclerotic  calcifications.    ONEIDA NUNEZ MD

## 2021-03-25 LAB
LABORATORY COMMENT REPORT: NORMAL
SARS-COV-2 RNA RESP QL NAA+PROBE: NEGATIVE
SPECIMEN SOURCE: NORMAL

## 2021-03-25 NOTE — PATIENT INSTRUCTIONS
Blood work and chest xray look fine today.    COVID test is still pending. You will be called if positive. Do not come in on Friday if this is positive.

## 2021-03-26 ENCOUNTER — TELEPHONE (OUTPATIENT)
Dept: FAMILY MEDICINE | Facility: CLINIC | Age: 81
End: 2021-03-26

## 2021-03-26 ENCOUNTER — OFFICE VISIT (OUTPATIENT)
Dept: FAMILY MEDICINE | Facility: CLINIC | Age: 81
End: 2021-03-26
Payer: MEDICARE

## 2021-03-26 VITALS
HEIGHT: 72 IN | HEART RATE: 72 BPM | WEIGHT: 293 LBS | TEMPERATURE: 96.2 F | OXYGEN SATURATION: 93 % | DIASTOLIC BLOOD PRESSURE: 65 MMHG | BODY MASS INDEX: 39.68 KG/M2 | SYSTOLIC BLOOD PRESSURE: 120 MMHG

## 2021-03-26 DIAGNOSIS — I26.99 PE (PULMONARY THROMBOEMBOLISM) (H): ICD-10-CM

## 2021-03-26 DIAGNOSIS — R05.9 COUGH: Primary | ICD-10-CM

## 2021-03-26 DIAGNOSIS — J41.1 MUCOPURULENT CHRONIC BRONCHITIS (H): ICD-10-CM

## 2021-03-26 DIAGNOSIS — I48.0 PAROXYSMAL A-FIB (H): ICD-10-CM

## 2021-03-26 DIAGNOSIS — I82.4Z9 DEEP VEIN THROMBOSIS (DVT) OF DISTAL VEIN OF LOWER EXTREMITY, UNSPECIFIED CHRONICITY, UNSPECIFIED LATERALITY (H): ICD-10-CM

## 2021-03-26 DIAGNOSIS — R06.2 WHEEZE: ICD-10-CM

## 2021-03-26 PROCEDURE — 99214 OFFICE O/P EST MOD 30 MIN: CPT | Performed by: INTERNAL MEDICINE

## 2021-03-26 PROCEDURE — 85610 PROTHROMBIN TIME: CPT | Performed by: INTERNAL MEDICINE

## 2021-03-26 PROCEDURE — 36416 COLLJ CAPILLARY BLOOD SPEC: CPT | Performed by: INTERNAL MEDICINE

## 2021-03-26 RX ORDER — AZITHROMYCIN 250 MG/1
TABLET, FILM COATED ORAL
Qty: 6 TABLET | Refills: 0 | Status: SHIPPED | OUTPATIENT
Start: 2021-03-26 | End: 2021-03-31

## 2021-03-26 RX ORDER — PREDNISONE 20 MG/1
TABLET ORAL
Qty: 18 TABLET | Refills: 0 | Status: SHIPPED | OUTPATIENT
Start: 2021-03-26 | End: 2021-04-21

## 2021-03-26 ASSESSMENT — MIFFLIN-ST. JEOR: SCORE: 2072.04

## 2021-03-26 NOTE — PROGRESS NOTES
Assessment & Plan   Problem List Items Addressed This Visit        Respiratory    COPD (chronic obstructive pulmonary disease) (H)    Relevant Medications    predniSONE (DELTASONE) 20 MG tablet    azithromycin (ZITHROMAX) 250 MG tablet    umeclidinium-vilanterol (ANORO ELLIPTA) 62.5-25 MCG/INH oral inhaler    ipratropium-albuterol (COMBIVENT RESPIMAT)  MCG/ACT inhaler    ipratropium - albuterol 0.5 mg/2.5 mg/3 mL (DUONEB) 0.5-2.5 (3) MG/3ML neb solution    tiotropium (SPIRIVA RESPIMAT) 2.5 MCG/ACT inhaler    Other Relevant Orders    PULMONARY MEDICINE REFERRAL      Other Visit Diagnoses     Cough    -  Primary    Relevant Medications    predniSONE (DELTASONE) 20 MG tablet    azithromycin (ZITHROMAX) 250 MG tablet    umeclidinium-vilanterol (ANORO ELLIPTA) 62.5-25 MCG/INH oral inhaler    ipratropium-albuterol (COMBIVENT RESPIMAT)  MCG/ACT inhaler    ipratropium - albuterol 0.5 mg/2.5 mg/3 mL (DUONEB) 0.5-2.5 (3) MG/3ML neb solution    tiotropium (SPIRIVA RESPIMAT) 2.5 MCG/ACT inhaler    Other Relevant Orders    PULMONARY MEDICINE REFERRAL    Wheeze        Relevant Medications    predniSONE (DELTASONE) 20 MG tablet    azithromycin (ZITHROMAX) 250 MG tablet    umeclidinium-vilanterol (ANORO ELLIPTA) 62.5-25 MCG/INH oral inhaler    ipratropium-albuterol (COMBIVENT RESPIMAT)  MCG/ACT inhaler    ipratropium - albuterol 0.5 mg/2.5 mg/3 mL (DUONEB) 0.5-2.5 (3) MG/3ML neb solution    tiotropium (SPIRIVA RESPIMAT) 2.5 MCG/ACT inhaler         Patient presenting for follow-up some wheezing on exam, today his pulse ox is 93% on room air no labored breathing.  He has been treated couple weeks ago with a course of prednisone and Z-Deny.  Patient continues to have some irritative postnasal drip feeling as well as cough.  Shortness of breath with exertion.  Describes some yellow phlegm associated.  Advised him there is always possibility that lisinopril could be causing his irritative cough unrelated to his  shortness of breath and wheezing.  Has chronic lower extremity edema thought not related to pulmonary hypertension or right-sided heart failure as right heart cath showed slightly limited PA pressures are normal left-sided wedge pressure  Echo showed good ejection fraction.  There was mild diastolic dysfunction.  His beta-blocker was discontinued due to persistent hyperkalemia potassium 5.4 corrected to 4.5.  His proBNP was normal.  His wheezing probably is due to acute exacerbation of chronic bronchitis/COPD.  If he does not respond to Z-Deny will consider alternatives such as Augmentin [or Cipro].  We will have him see pulmonary specialist we will need repeat spirometry determine his FEV1...  We will start him on LAMA/LABA.  In addition to his short acting [MOUSTAPHA] rescue inhaler.  He is considered grade 2 by GOLD criteria as per last visit by pulmonary specialist.      Addendum 3/27/2021: MADISON FLOREZ was not covered by his insurance, will send prescription for Combivent but he has a high co-pay  $170.  Sent a new prescription for DuoNeb nebulizer to use as needed for acute exacerbations as well Spiriva daily [not used together so as not to increase risk of arrhythmias/QT prolongation].  He may benefit from LAMA/LABA combo treatment then LAMA alone, but this is dictated by his insurance cover and inhaler preferred on his insurance, will also have pulmonary evaluate.  Patient reports that he already has been feeling better today as far as breathing since he started the prednisone.           BMI:   Estimated body mass index is 39.74 kg/m  as calculated from the following:    Height as of this encounter: 1.829 m (6').    Weight as of this encounter: 132.9 kg (293 lb).   Weight management plan: Discussed healthy diet and exercise guidelines    CONSULTATION/REFERRAL to PULMONARY   Work on weight loss  Regular exercise  See Patient Instructions    Return in about 1 week (around 4/2/2021), or if symptoms worsen or fail to  improve, for As needed and if symptoms worsen, other, shortness of breath.    Anne Heath MD  [facility] HERNAN Zamorano is a 80 year old who presents for the following health issues   HPI     ED/UC Followup:    Facility:  [facility]  Date of visit: 03/24/2021  Reason for visit: Breathing Problem   Current Status: SAME       Tickle in throat, irritative cough    In morning has productive cough, yellow phlegm, no fever chills.  Shortness of breath with exertion.  Uses CPAP with oxygen at night.    Using albuterol every 4 hours.  He was on LAMA/LABA in the past Bevespi [employer], there has been discontinued          Review of Systems   Constitutional, HEENT, cardiovascular, pulmonary, gi and gu systems are negative, except as otherwise noted.      Objective    /65 (BP Location: Right arm, Patient Position: Chair, Cuff Size: Adult Large)   Pulse 72   Temp 96.2  F (35.7  C) (Temporal)   Ht 1.829 m (6')   Wt 132.9 kg (293 lb)   SpO2 93%   BMI 39.74 kg/m    Body mass index is 39.74 kg/m .  Physical Exam   General appearance Pleasant gentleman not in acute distress obese  Neck supple no masses  Lungs bilateral prolonged expiratory phase with some intermittent wheeze on exhalation, fair air entry bilaterally  -No use of accessory muscles, good chest rise bilateral  -No crackles or rhonchi  Heart regular rate and rhythm, no murmur  Abdomen soft, obese  Extremities no pitting edema bilateral  Neuro grossly intact    Office Visit on 03/24/2021   Component Date Value Ref Range Status     WBC 03/24/2021 10.4  4.0 - 11.0 10e9/L Final     RBC Count 03/24/2021 3.65* 4.4 - 5.9 10e12/L Final     Hemoglobin 03/24/2021 11.9* 13.3 - 17.7 g/dL Final     Hematocrit 03/24/2021 37.3* 40.0 - 53.0 % Final     MCV 03/24/2021 102* 78 - 100 fl Final     MCH 03/24/2021 32.6  26.5 - 33.0 pg Final     MCHC 03/24/2021 31.9  31.5 - 36.5 g/dL Final     RDW 03/24/2021 15.8* 10.0 - 15.0  % Final     Platelet Count 03/24/2021 208  150 - 450 10e9/L Final     % Neutrophils 03/24/2021 62.0  % Final     % Lymphocytes 03/24/2021 23.8  % Final     % Monocytes 03/24/2021 8.4  % Final     % Eosinophils 03/24/2021 5.2  % Final     % Basophils 03/24/2021 0.6  % Final     Absolute Neutrophil 03/24/2021 6.5  1.6 - 8.3 10e9/L Final     Absolute Lymphocytes 03/24/2021 2.5  0.8 - 5.3 10e9/L Final     Absolute Monocytes 03/24/2021 0.9  0.0 - 1.3 10e9/L Final     Absolute Eosinophils 03/24/2021 0.5  0.0 - 0.7 10e9/L Final     Absolute Basophils 03/24/2021 0.1  0.0 - 0.2 10e9/L Final     Diff Method 03/24/2021 Automated Method   Final     COVID-19 Virus PCR to [facility] - So* 03/24/2021 Nasopharyngeal   Final     COVID-19 Virus PCR to [facility] - Re* 03/24/2021 Test received-See reflex to IDDL test SARS CoV2 (COVID-19) Virus RT-PCR   Final     SARS-CoV-2 Virus Specimen Source 03/24/2021 Nasopharyngeal   Final     SARS-CoV-2 PCR Result 03/24/2021 NEGATIVE   Final    SARS-CoV2 (COVID-19) RNA not detected, presumed negative.     SARS-CoV-2 PCR Comment 03/24/2021    Final                    Value:Testing was performed using the Aptima SARS-CoV-2 Assay on the [employer] Instrument System.   Additional information about this Emergency Use Authorization (EUA) assay can be found via   the Lab Guide.      Comment: This test should be ordered for the detection of SARS-CoV-2 in individuals who   meet SARS-CoV-2 clinical and/or epidemiological criteria. Test performance is   unknown in asymptomatic patients.  This test is for in vitro diagnostic use under the FDA EUA for laboratories   certified under CLIA to perform high complexity testing. This test has not   been FDA cleared or approved.  A negative result does not rule out the presence of PCR inhibitors in the   specimen or target RNA in concentration below the limit of detection for the   assay. The possibility of a false negative should be considered if the   patient's recent  exposure or clinical presentation suggests COVID-19.  This test was validated by the [facility]   [facility]. This laboratory is certified under the Clinical   Laboratory Improvement Amendments of 1988 (CLIA-88) as qualified to perform   high complexity laboratory testing.

## 2021-03-26 NOTE — TELEPHONE ENCOUNTER
Disp Refills Start End SAL   umeclidinium-vilanterol (ANORO ELLIPTA) 62.5-25 MCG/INH oral inhaler 60 each 0 3/26/2021  --   Sig - Route: Inhale 1 puff into the lungs daily - Inhalation     Per pharmacy above is not covered by patient's insurance    Pharmacy is requesting an alternative

## 2021-03-27 ENCOUNTER — TELEPHONE (OUTPATIENT)
Dept: FAMILY MEDICINE | Facility: CLINIC | Age: 81
End: 2021-03-27

## 2021-03-27 DIAGNOSIS — R05.9 COUGH: ICD-10-CM

## 2021-03-27 DIAGNOSIS — J41.1 MUCOPURULENT CHRONIC BRONCHITIS (H): ICD-10-CM

## 2021-03-27 DIAGNOSIS — R06.2 WHEEZE: ICD-10-CM

## 2021-03-27 RX ORDER — IPRATROPIUM BROMIDE AND ALBUTEROL SULFATE 2.5; .5 MG/3ML; MG/3ML
1 SOLUTION RESPIRATORY (INHALATION) EVERY 6 HOURS PRN
Qty: 150 ML | Refills: 0 | Status: SHIPPED | OUTPATIENT
Start: 2021-03-27 | End: 2021-03-29

## 2021-03-27 NOTE — TELEPHONE ENCOUNTER
This provider called patient as a follow-up..  Patient did not  Combivent as he had $170 co-pay.  Advised patient we will send new prescription for DuoNeb nebulizer to use during acute exacerbation episodes/flare.  Also prescription sent for Spiriva inhaler daily use.  [Do not use same time as DuoNeb].  Discussed side effects of such combo [SAMI/MOUSTAPHA inhalers and LAMA].  Advised patient follow-up with pulmonary specialist he will schedule an appointment.  Patient states he feels better today, he did start on prednisone that was sent to pharmacy as well as started on Zithromax. patient was very appreciative and thankful of follow-up phone call  Dr. Heath

## 2021-03-27 NOTE — TELEPHONE ENCOUNTER
Disp Refills Start End SAL   ipratropium - albuterol 0.5 mg/2.5 mg/3 mL (DUONEB) 0.5-2.5 (3) MG/3ML neb solution 150 mL 0 3/27/2021  --   Sig - Route: Take 1 vial (3 mLs) by nebulization every 6 hours as needed for shortness of breath / dyspnea or wheezing - Nebulization     Fax received from pharmacy regarding above med    Note from pharmacy:  Qty needs to be in increments of 90mL, can we do 2 boxes (30 vials per box)

## 2021-03-29 ENCOUNTER — ANTICOAGULATION THERAPY VISIT (OUTPATIENT)
Dept: NURSING | Facility: CLINIC | Age: 81
End: 2021-03-29

## 2021-03-29 DIAGNOSIS — I26.99 PE (PULMONARY THROMBOEMBOLISM) (H): ICD-10-CM

## 2021-03-29 DIAGNOSIS — I82.4Z9 DEEP VEIN THROMBOSIS (DVT) OF DISTAL VEIN OF LOWER EXTREMITY, UNSPECIFIED CHRONICITY, UNSPECIFIED LATERALITY (H): ICD-10-CM

## 2021-03-29 DIAGNOSIS — I48.0 PAROXYSMAL A-FIB (H): ICD-10-CM

## 2021-03-29 LAB — INR PPP: 2.9 (ref 0.86–1.14)

## 2021-03-29 RX ORDER — IPRATROPIUM BROMIDE AND ALBUTEROL SULFATE 2.5; .5 MG/3ML; MG/3ML
1 SOLUTION RESPIRATORY (INHALATION) EVERY 6 HOURS PRN
Qty: 180 ML | Refills: 0 | Status: SHIPPED | OUTPATIENT
Start: 2021-03-29 | End: 2021-05-13

## 2021-03-29 NOTE — PROGRESS NOTES
ANTICOAGULATION MANAGEMENT     Patient Name:  Erich Craven  Date:  3/29/2021    ASSESSMENT /SUBJECTIVE:    Today's INR result of 2.9 is therapeutic. Goal INR of 2.0-3.0      Warfarin dose taken: Warfarin taken as instructed    Diet: No new diet changes affecting INR    Medication changes/ interactions: Potential interaction between zpack and Prednisone and warfarin which may affect subsequent INRs    Previous INR: Therapeutic     S/S of bleeding or thromboembolism: No    New injury or illness: Yes: Bronchitis, was seen on 3/26 and zpack and prednisone taper ordered.  INR result not received until Monday 3/29    Upcoming surgery, procedure or cardioversion: No    Additional findings: None      PLAN:    Telephone call with Erich regarding INR result and instructed:     Warfarin Dosing Instructions: Continue your current warfarin dose 7.5mg Fri and 5mg AOD    Instructed patient to follow up no later than: 2 days  Lab visit scheduled    Education provided: Potential interaction between warfarin and prednisone and Zpack , Monitoring for bleeding signs and symptoms and Monitoring for clotting signs and symptoms      Jaun verbalizes understanding and agrees to warfarin dosing plan.    Instructed to call the Anticoagulation Clinic for any changes, questions or concerns. (#[phone])        Evita Lloyd RN      OBJECTIVE:  Recent labs: (last 7 days)     03/26/21  1435   INR 2.90*         No question data found.  Anticoagulation Summary  As of 3/29/2021    INR goal:  2.0-3.0   TTR:  74.6 % (1 y)   INR used for dosing:  No new INR was available at the time of this encounter.   Warfarin maintenance plan:  7.5 mg (5 mg x 1.5) every Fri; 5 mg (5 mg x 1) all other days   Full warfarin instructions:  7.5 mg every Fri; 5 mg all other days   Weekly warfarin total:  37.5 mg   Plan last modified:  Evita Lloyd, RN (2/22/2021)   Next INR check:  3/31/2021   Priority:  Maintenance   Target end date:  Indefinite     Indications    PE (pulmonary thromboembolism) (H) [I26.99]  Paroxysmal A-fib (H) [I48.0]  Deep vein thrombosis (DVT) of distal vein of lower extremity  unspecified chronicity  unspecified laterality (H) [I82.4Z9]             Anticoagulation Episode Summary     INR check location:      Preferred lab:      Send INR reminders to:  SERENITY BECERRA    Comments:        Anticoagulation Care Providers     Provider Role Specialty Phone number    Dontrell Gómez MD Referring Internal Medicine [phone]    Anne Heath MD Referring Internal Medicine [phone]

## 2021-03-29 NOTE — TELEPHONE ENCOUNTER
PCP - see below     Rx was ordered for 150mg     Pharmacy unable to dispense this quantity (only comes in increments of 90 mL)     Pended for 180mL

## 2021-03-31 ENCOUNTER — ANTICOAGULATION THERAPY VISIT (OUTPATIENT)
Dept: FAMILY MEDICINE | Facility: CLINIC | Age: 81
End: 2021-03-31

## 2021-03-31 DIAGNOSIS — I82.4Z9 DEEP VEIN THROMBOSIS (DVT) OF DISTAL VEIN OF LOWER EXTREMITY, UNSPECIFIED CHRONICITY, UNSPECIFIED LATERALITY (H): ICD-10-CM

## 2021-03-31 DIAGNOSIS — I48.0 PAROXYSMAL A-FIB (H): ICD-10-CM

## 2021-03-31 DIAGNOSIS — I26.99 PE (PULMONARY THROMBOEMBOLISM) (H): ICD-10-CM

## 2021-03-31 DIAGNOSIS — F41.9 ANXIETY: ICD-10-CM

## 2021-03-31 LAB — INR PPP: 2.7 (ref 0.86–1.14)

## 2021-03-31 PROCEDURE — 36416 COLLJ CAPILLARY BLOOD SPEC: CPT | Performed by: INTERNAL MEDICINE

## 2021-03-31 PROCEDURE — 85610 PROTHROMBIN TIME: CPT | Performed by: INTERNAL MEDICINE

## 2021-03-31 NOTE — PROGRESS NOTES
ANTICOAGULATION MANAGEMENT     Patient Name:  Erich Craven  Date:  3/31/2021    ASSESSMENT /SUBJECTIVE:    Today's INR result of 2.7 is therapeutic. Goal INR of 2.0-3.0      Warfarin dose taken: Warfarin taken as instructed    Diet: No new diet changes affecting INR    Medication changes/ interactions: Interaction between Zpak and Prednisone started on 3/26 and warfarin may be affecting INR. Abx course finished today. Patient has six days left of prednisone taper.    Previous INR: Therapeutic     S/S of bleeding or thromboembolism: No    New injury or illness: Yes: COPD exacerbation versus bronchitis.    Upcoming surgery, procedure or cardioversion: No    Additional findings: None      PLAN:    Telephone call with Erich regarding INR result and instructed:     Warfarin Dosing Instructions: Continue your current warfarin dose 7.5 mg Fri + 5 mg AOD.    Instructed patient to follow up no later than: 2 weeks  Lab visit scheduled    Education provided: Please call back if any changes to your diet, medications or how you've been taking warfarin, Monitoring for bleeding signs and symptoms, Monitoring for clotting signs and symptoms and Importance of notifying clinic for changes in medications; a sooner lab recheck maybe needed.      Patient verbalizes understanding and agrees to warfarin dosing plan.    Instructed to call the Anticoagulation Clinic for any changes, questions or concerns. (#[phone])        Nicole Gaytan RN      OBJECTIVE:  Recent labs: (last 7 days)     21  1435 21  0943   INR 2.90* 2.70*         No question data found.  Anticoagulation Summary  As of 3/31/2021    INR goal:  2.0-3.0   TTR:  74.8 % (1 y)   INR used for dosin.70 (3/31/2021)   Warfarin maintenance plan:  7.5 mg (5 mg x 1.5) every Fri; 5 mg (5 mg x 1) all other days   Full warfarin instructions:  7.5 mg every Fri; 5 mg all other days   Weekly warfarin total:  37.5 mg   No change documented:  Nicole Gaytan  RN   Plan last modified:  Evita Lloyd RN (2/22/2021)   Next INR check:  4/14/2021   Priority:  Maintenance   Target end date:  Indefinite    Indications    PE (pulmonary thromboembolism) (H) [I26.99]  Paroxysmal A-fib (H) [I48.0]  Deep vein thrombosis (DVT) of distal vein of lower extremity  unspecified chronicity  unspecified laterality (H) [I82.4Z9]             Anticoagulation Episode Summary     INR check location:      Preferred lab:      Send INR reminders to:  SERENITY BECERRA    Comments:        Anticoagulation Care Providers     Provider Role Specialty Phone number    Dontrell Gómez MD Referring Internal Medicine [phone]    Anne Heath MD Referring Internal Medicine [phone]

## 2021-03-31 NOTE — TELEPHONE ENCOUNTER
Sertraline 25 mg tablet    Summary: Take 1 tablet (25 mg) by mouth daily For 2 weeks, then increase to 2 tablets (50mg) daily, Disp-45 tablet, R-0, E-Prescribe   Dose, Route, Frequency: 25 mg, Oral, DAILY  Start: 3/9/2021  Ord/Sold: 3/9/2021

## 2021-04-01 RX ORDER — SERTRALINE HYDROCHLORIDE 25 MG/1
50 TABLET, FILM COATED ORAL DAILY
Qty: 180 TABLET | Refills: 1 | Status: SHIPPED | OUTPATIENT
Start: 2021-04-01 | End: 2021-09-29

## 2021-04-01 NOTE — TELEPHONE ENCOUNTER
See telephone note from Dr. Heath dated 03/27/2021. Patient has upcoming pulmonary appointment on 04/08/2021 and will discuss with them further at that time.    Devin Yousif, CMA on 4/1/2021 at 8:16 AM

## 2021-04-01 NOTE — TELEPHONE ENCOUNTER
Routing refill request to provider for review/approval because:  Last Rx was for tapering up dosing   Pended for 2 daily

## 2021-04-05 DIAGNOSIS — E78.5 HYPERLIPIDEMIA LDL GOAL <100: ICD-10-CM

## 2021-04-06 RX ORDER — ATORVASTATIN CALCIUM 40 MG/1
TABLET, FILM COATED ORAL
Qty: 90 TABLET | Refills: 2 | Status: SHIPPED | OUTPATIENT
Start: 2021-04-06 | End: 2021-12-31

## 2021-04-06 NOTE — TELEPHONE ENCOUNTER
Prescription approved per [facility] Refill Protocol.    Georgiana Jeninngs RN

## 2021-04-08 ENCOUNTER — TRANSFERRED RECORDS (OUTPATIENT)
Dept: HEALTH INFORMATION MANAGEMENT | Facility: CLINIC | Age: 81
End: 2021-04-08

## 2021-04-08 DIAGNOSIS — F43.21 ADJUSTMENT DISORDER WITH DEPRESSED MOOD: ICD-10-CM

## 2021-04-08 NOTE — TELEPHONE ENCOUNTER
RECORDS RECEIVED FROM: Internal   DATE RECEIVED: 06.16.2021   NOTES STATUS DETAILS   OFFICE NOTE from referring provider Internal 02.19.2021 Anne Heath MD   OFFICE NOTE from other specialist  Internal 12.14.2020 Keara Lopez PA-C   *Only VASCULITIS or LUPUS gather office notes for the following N/A    *PULMONARY   N/A    *ENT N/A    *DERMATOLOGY N/A    *RHEUMATOLOGY N/A    DISCHARGE SUMMARY from hospital N/A    DISCHARGE REPORT from the ER N/A    MEDICATION LIST Internal    IMAGING  (NEED IMAGES AND REPORTS)     KIDNEY CT SCAN N/A    KIDNEY ULTRASOUND N/A    MR ABDOMEN N/A    NUCLEAR MEDICINE RENAL N/A    LABS     CBC Internal 03.04.2021   CMP Internal 03.04.2021   BMP Internal 03.02.2021   UA Internal 12.21.2020   URINE PROTEIN Internal 12.21.2020   RENAL PANEL N/A    BIOPSY     KIDNEY BIOPSY  N/A

## 2021-04-09 RX ORDER — CLONAZEPAM 0.5 MG/1
TABLET ORAL
Qty: 60 TABLET | Refills: 0 | Status: SHIPPED | OUTPATIENT
Start: 2021-04-09 | End: 2021-05-08

## 2021-04-09 NOTE — TELEPHONE ENCOUNTER
Clonazepam 0.5 mg tablet    Summary: TAKE 1 TABLET BY MOUTH TWICE A DAY AS NEEDED, Disp-60 tablet, R-2, E-Prescribe   Start: 1/8/2021  Ord/Sold: 1/8/2021

## 2021-04-09 NOTE — TELEPHONE ENCOUNTER
Routing refill request to provider for review/approval because:  Drug not on the [facility] refill protocol     Pending Prescriptions:                       Disp   Refills    clonazePAM (KLONOPIN) 0.5 MG tablet       60 tab*2            Sig: TAKE 1 TABLET BY MOUTH TWICE A DAY AS NEEDED    Last Written Prescription Date:  1/8/2021  Last Fill Quantity: 60,  # refills: 2   Last office visit: 3/26/2021 with prescribing provider:  Ivana  Future Office Visit:      Georgiana Jennings RN  [facility]

## 2021-04-19 ENCOUNTER — ANTICOAGULATION THERAPY VISIT (OUTPATIENT)
Dept: NURSING | Facility: CLINIC | Age: 81
End: 2021-04-19

## 2021-04-19 DIAGNOSIS — I82.4Z9 DEEP VEIN THROMBOSIS (DVT) OF DISTAL VEIN OF LOWER EXTREMITY, UNSPECIFIED CHRONICITY, UNSPECIFIED LATERALITY (H): ICD-10-CM

## 2021-04-19 DIAGNOSIS — I26.99 PE (PULMONARY THROMBOEMBOLISM) (H): ICD-10-CM

## 2021-04-19 DIAGNOSIS — I48.0 PAROXYSMAL A-FIB (H): ICD-10-CM

## 2021-04-19 LAB
CAPILLARY BLOOD COLLECTION: NORMAL
INR PPP: 2.8 (ref 0.86–1.14)

## 2021-04-19 PROCEDURE — 85610 PROTHROMBIN TIME: CPT | Performed by: INTERNAL MEDICINE

## 2021-04-19 PROCEDURE — 36416 COLLJ CAPILLARY BLOOD SPEC: CPT | Performed by: INTERNAL MEDICINE

## 2021-04-19 NOTE — PROGRESS NOTES
ANTICOAGULATION MANAGEMENT     Patient Name:  Erich Craven  Date:  4/19/2021    ASSESSMENT /SUBJECTIVE:    Today's INR result of 2.8 is therapeutic. Goal INR of 2.0-3.0      Warfarin dose taken: Warfarin taken as instructed    Diet: No new diet changes affecting INR    Medication changes/ interactions: No interaction expected between Spiriva and Anoro inhaler and warfarin    Previous INR: Therapeutic     S/S of bleeding or thromboembolism: No    New injury or illness: No    Upcoming surgery, procedure or cardioversion: No    Additional findings: None      PLAN:    Telephone call with Erich regarding INR result and instructed:     Warfarin Dosing Instructions: Continue your current warfarin dose 7.5mg Fri and 5 mg AOD    Instructed patient to follow up no later than: 6 weeks  Lab visit scheduled    Education provided: Monitoring for bleeding signs and symptoms and Monitoring for clotting signs and symptoms      Jaun verbalizes understanding and agrees to warfarin dosing plan.    Instructed to call the Anticoagulation Clinic for any changes, questions or concerns. (#[phone])        Evita Lloyd RN      OBJECTIVE:  Recent labs: (last 7 days)     04/19/21  1029   INR 2.80*         No question data found.  Anticoagulation Summary  As of 4/19/2021    INR goal:  2.0-3.0   TTR:  74.8 % (1 y)   INR used for dosing:  No new INR was available at the time of this encounter.   Warfarin maintenance plan:  7.5 mg (5 mg x 1.5) every Fri; 5 mg (5 mg x 1) all other days   Full warfarin instructions:  7.5 mg every Fri; 5 mg all other days   Weekly warfarin total:  37.5 mg   No change documented:  Evita Lloyd RN   Plan last modified:  Evita Lloyd RN (2/22/2021)   Next INR check:  5/31/2021   Priority:  Maintenance   Target end date:  Indefinite    Indications    PE (pulmonary thromboembolism) (H) [I26.99]  Paroxysmal A-fib (H) [I48.0]  Deep vein thrombosis (DVT) of distal vein of lower  extremity  unspecified chronicity  unspecified laterality (H) [I82.4Z9]             Anticoagulation Episode Summary     INR check location:      Preferred lab:      Send INR reminders to:  SERENITY BECERRA    Comments:        Anticoagulation Care Providers     Provider Role Specialty Phone number    Dontrell Gómez MD Referring Internal Medicine [phone]    Anne Heath MD Referring Internal Medicine [phone]

## 2021-04-21 ENCOUNTER — OFFICE VISIT (OUTPATIENT)
Dept: FAMILY MEDICINE | Facility: CLINIC | Age: 81
End: 2021-04-21
Payer: MEDICARE

## 2021-04-21 VITALS
BODY MASS INDEX: 39.14 KG/M2 | HEART RATE: 62 BPM | HEIGHT: 72 IN | DIASTOLIC BLOOD PRESSURE: 74 MMHG | SYSTOLIC BLOOD PRESSURE: 122 MMHG | TEMPERATURE: 97 F | OXYGEN SATURATION: 96 % | WEIGHT: 289 LBS

## 2021-04-21 DIAGNOSIS — R39.15 URINARY URGENCY: Primary | ICD-10-CM

## 2021-04-21 DIAGNOSIS — E11.9 TYPE 2 DIABETES MELLITUS WITHOUT COMPLICATION, WITHOUT LONG-TERM CURRENT USE OF INSULIN (H): ICD-10-CM

## 2021-04-21 DIAGNOSIS — R05.9 COUGH: ICD-10-CM

## 2021-04-21 DIAGNOSIS — Z79.01 CURRENT USE OF ANTICOAGULANT THERAPY: ICD-10-CM

## 2021-04-21 LAB
ALBUMIN UR-MCNC: NEGATIVE MG/DL
ANION GAP SERPL CALCULATED.3IONS-SCNC: <1 MMOL/L (ref 3–14)
APPEARANCE UR: CLEAR
BILIRUB UR QL STRIP: NEGATIVE
BUN SERPL-MCNC: 44 MG/DL (ref 7–30)
CALCIUM SERPL-MCNC: 9.2 MG/DL (ref 8.5–10.1)
CHLORIDE SERPL-SCNC: 99 MMOL/L (ref 94–109)
CO2 SERPL-SCNC: 39 MMOL/L (ref 20–32)
COLOR UR AUTO: YELLOW
CREAT SERPL-MCNC: 1.02 MG/DL (ref 0.66–1.25)
GFR SERPL CREATININE-BSD FRML MDRD: 68 ML/MIN/{1.73_M2}
GLUCOSE SERPL-MCNC: 104 MG/DL (ref 70–99)
GLUCOSE UR STRIP-MCNC: NEGATIVE MG/DL
HGB UR QL STRIP: NEGATIVE
KETONES UR STRIP-MCNC: NEGATIVE MG/DL
LEUKOCYTE ESTERASE UR QL STRIP: NEGATIVE
NITRATE UR QL: NEGATIVE
PH UR STRIP: 7 PH (ref 5–7)
POTASSIUM SERPL-SCNC: 3.7 MMOL/L (ref 3.4–5.3)
SODIUM SERPL-SCNC: 138 MMOL/L (ref 133–144)
SOURCE: NORMAL
SP GR UR STRIP: 1.01 (ref 1–1.03)
UROBILINOGEN UR STRIP-ACNC: 0.2 EU/DL (ref 0.2–1)

## 2021-04-21 PROCEDURE — 81003 URINALYSIS AUTO W/O SCOPE: CPT | Performed by: FAMILY MEDICINE

## 2021-04-21 PROCEDURE — 36415 COLL VENOUS BLD VENIPUNCTURE: CPT | Performed by: FAMILY MEDICINE

## 2021-04-21 PROCEDURE — 80048 BASIC METABOLIC PNL TOTAL CA: CPT | Performed by: FAMILY MEDICINE

## 2021-04-21 PROCEDURE — 99214 OFFICE O/P EST MOD 30 MIN: CPT | Performed by: FAMILY MEDICINE

## 2021-04-21 RX ORDER — TAMSULOSIN HYDROCHLORIDE 0.4 MG/1
0.4 CAPSULE ORAL DAILY
COMMUNITY
End: 2021-05-27

## 2021-04-21 ASSESSMENT — MIFFLIN-ST. JEOR: SCORE: 2053.9

## 2021-04-21 NOTE — PROGRESS NOTES
Assessment & Plan     Erich was seen today for urinary problem.    Diagnoses and all orders for this visit:    Urinary urgency.  Urinalysis is negative today.  Patient has a previous history of BPH, per chart.  Flomax was stopped ~6 weeks ago, which seems to correlate with the timing of the patient's symptoms and incomplete voiding.  -     *UA reflex to Microscopic and Culture ([facility] (except [place] and [place])  -     UROLOGY ADULT REFERRAL; Future  -     Basic metabolic panel    Cough, significantly improved post recent Azithromycin and Prednisone treatment.  Patient has a complicated past medical history, which includes:  COPD, heart failure with preserved ejection fraction, CAD, and pulmonary thromboembolism.    Type 2 diabetes mellitus without complication, without long-term current use of insulin (H), post recent Prednisone treatment.  Not currently on medications, with last A1C 5.8.    Current use of anticoagulant therapy, with last INR 2.80.    Discussed risks and benefits of treatment strategies.    Patient Instructions     Restart Flomax, as discussed.    Recheck with primary care in 3 weeks if you are improving on Flomax treatment.    Consult with Urology if continued difficult or painful urination 3 weeks after restarting Flomax.    Follow up in the ER immediately if:  fever, severe/worsening abdominal pain, severe/worsening back pain, or other emergent symptoms, as discussed.        Consider following up with [facility], as discussed.       Please verify if you are taking Spiriva or Anoro Ellipta, as discussed at the time of your visit.    Follow-up with Pulmonary, as previously recommended by your provider.        Return in about 3 weeks (around 5/12/2021) for Follow up in 3 weeks, as directed on After Visit Summary.    Paola Yee MD  [facility] HERNAN Zamorano is a 80 year old male, who presents to clinic today for the  following health issues:    Chief Complaint   Patient presents with     Urinary Problem       HPI    UTI Concern    Urinary symptoms started 1.5 weeks ago.   Symptoms include:  Dysuria (before and during urination), urgency, frequency and occasional urge incontinence.     Symptoms are intermittent, improving today.  Fever, Chills, Nausea/Vomiting?:  No  Abdominal Pain?:  No  Flank Pain?:  No  Hematuria?:  No  Testicular Swelling/Mass?:  No  Penile Pain/Discharge?:  Occasional pain with coughing, but no discharge.  STD Concerns?:  No  History of BPH//Prostatitis/UTI's?:  Yes - Treated with Macrobid x 5 days recently, per patient.  Previously on a Flomax (Dx=BPH), which was stopped by [facility] 3/9/2021.  Urinary symptoms and incomplete voiding started after stopping Flomax treatment.  Not seen by Urology in the past, per patient.  History of Kidney Stones?:  Yes  Last Creatinine:    Recent Labs   Lab Test 03/02/21  1405   CR 1.42*      History of COPD and Cough, treated with Azithromycin and Prednisone 3/26/2021.    History of heart failure with preserved ejection fraction (on Lasix and Metolazone), followed by Cardiology.  Patient finished his Prednisone taper yesterday.  Doing well with his current COPD treatment, but patient is uncertain which inhaler he is using.  Patient will verify if he is taking Spiriva or Anoro Ellipta at this time.  Some phlegm in the morning, but cough is otherwise improved.  No current chest pain, shortness of breath, or wheezing.  Lower extremity edema has resolved.  No orthopnea.  Weight has decreased 4 lbs since 3/26/2021, per chart review.  Referred to Pulmonary 3/26/2021, per chart review.    On anticoagulation, followed by the Anticoagulation Clinic.  Last INR:    Recent Labs   Lab Test 04/19/21  1029   INR 2.80*     History of diabetes type 2, not currently on medications.  Not monitoring his blood sugars.  Completed Prednisone treatment yesterday, as noted.  Hemoglobin A1C (%)    Date Value   02/19/2021 5.8 (H)   08/11/2020 6.7 (H)     Review of Systems - See the HPI above.        Objective    /74 (BP Location: Right arm, Patient Position: Chair, Cuff Size: Adult Large)   Pulse 62   Temp 97  F (36.1  C) (Oral)   Ht 1.829 m (6')   Wt 131.1 kg (289 lb)   SpO2 96%   BMI 39.20 kg/m    Physical Exam   GENERAL APPEARANCE:  Awake, alert, and in no acute distress.  PSYCHIATRIC:  Pleasant, smiling affect.  HEENT:  Sclera anicteric.  No conjunctivitis.  Mucous membranes moist.  Neck:  Spontaneous full range of motion.  HEART:  Regular rate and rhythm.  No murmurs, rubs, or gallops.  Distal pulses intact.  1-2+ edema to midcalf level bilaterally.  LUNGS:  No respiratory distress.  Transient wheeze at the right posterior lung base, which resolves with subsequent respirations.  Otherwise without wheezes, rales, or rhonchi.  ABDOMEN:   Soft, obese, not tender, and not distended.   No rebound tenderness or guarding.  BACK: No CVA tenderness.  /RECTAL:  Deferred.    LABORATORY:  Office Visit on 04/21/2021   Component Date Value Ref Range Status     Color Urine 04/21/2021 Yellow   Final     Appearance Urine 04/21/2021 Clear   Final     Glucose Urine 04/21/2021 Negative  NEG^Negative mg/dL Final     Bilirubin Urine 04/21/2021 Negative  NEG^Negative Final     Ketones Urine 04/21/2021 Negative  NEG^Negative mg/dL Final     Specific Gravity Urine 04/21/2021 1.015  1.003 - 1.035 Final     Blood Urine 04/21/2021 Negative  NEG^Negative Final     pH Urine 04/21/2021 7.0  5.0 - 7.0 pH Final     Protein Albumin Urine 04/21/2021 Negative  NEG^Negative mg/dL Final     Urobilinogen Urine 04/21/2021 0.2  0.2 - 1.0 EU/dL Final     Nitrite Urine 04/21/2021 Negative  NEG^Negative Final     Leukocyte Esterase Urine 04/21/2021 Negative  NEG^Negative Final     Source 04/21/2021 Midstream Urine   Final      BMP is pending.

## 2021-04-21 NOTE — PATIENT INSTRUCTIONS
Restart Flomax, as discussed.    Recheck with primary care in 3 weeks if you are improving on Flomax treatment.    Consult with Urology if continued difficult or painful urination 3 weeks after restarting Flomax.    Follow up in the ER immediately if:  fever, severe/worsening abdominal pain, severe/worsening back pain, or other emergent symptoms, as discussed.        Consider following up with [facility], as discussed.       Please verify if you are taking Spiriva or Anoro Ellipta, as discussed at the time of your visit.    Follow-up with Pulmonary, as previously recommended by your provider.

## 2021-05-03 ENCOUNTER — TELEPHONE (OUTPATIENT)
Dept: NEPHROLOGY | Facility: CLINIC | Age: 81
End: 2021-05-03

## 2021-05-03 NOTE — TELEPHONE ENCOUNTER
Reason for Call:  Reschedule appointment    Detailed comments: Patient wrote the wrong date down and thought appt was today 05/03. Patient would like reschedule. Patient said he never got a call or message through [employer]     Phone Number Patient can be reached at: Home number on file [phone] (home)    Best Time: ANY    Can we leave a detailed message on this number? Yes    Call taken on 5/3/2021 at 1:58 PM by Deyanira Wagoner

## 2021-05-04 NOTE — TELEPHONE ENCOUNTER
Contacted the Pt; LVM for Pt to call back to discuss appointment the missed appt? I do not see an appt that was scheduled with Rheumatology or a referral on file?

## 2021-05-13 ENCOUNTER — MYC MEDICAL ADVICE (OUTPATIENT)
Dept: CARDIOLOGY | Facility: CLINIC | Age: 81
End: 2021-05-13

## 2021-05-13 ENCOUNTER — VIRTUAL VISIT (OUTPATIENT)
Dept: PHARMACY | Facility: CLINIC | Age: 81
End: 2021-05-13
Payer: COMMERCIAL

## 2021-05-13 DIAGNOSIS — E11.9 TYPE 2 DIABETES MELLITUS WITHOUT COMPLICATION, WITHOUT LONG-TERM CURRENT USE OF INSULIN (H): ICD-10-CM

## 2021-05-13 DIAGNOSIS — I10 ESSENTIAL HYPERTENSION: ICD-10-CM

## 2021-05-13 DIAGNOSIS — G89.29 CHRONIC PAIN OF BOTH SHOULDERS: Primary | ICD-10-CM

## 2021-05-13 DIAGNOSIS — Z23 NEED FOR VACCINATION: ICD-10-CM

## 2021-05-13 DIAGNOSIS — M25.512 CHRONIC PAIN OF BOTH SHOULDERS: Primary | ICD-10-CM

## 2021-05-13 DIAGNOSIS — E03.9 HYPOTHYROIDISM, UNSPECIFIED TYPE: ICD-10-CM

## 2021-05-13 DIAGNOSIS — I26.99 PE (PULMONARY THROMBOEMBOLISM) (H): ICD-10-CM

## 2021-05-13 DIAGNOSIS — E78.49 OTHER HYPERLIPIDEMIA: ICD-10-CM

## 2021-05-13 DIAGNOSIS — F41.9 ANXIETY: ICD-10-CM

## 2021-05-13 DIAGNOSIS — I82.4Z9 DEEP VEIN THROMBOSIS (DVT) OF DISTAL VEIN OF LOWER EXTREMITY, UNSPECIFIED CHRONICITY, UNSPECIFIED LATERALITY (H): ICD-10-CM

## 2021-05-13 DIAGNOSIS — I50.33 ACUTE ON CHRONIC HEART FAILURE WITH PRESERVED EJECTION FRACTION (H): ICD-10-CM

## 2021-05-13 DIAGNOSIS — I48.0 PAROXYSMAL A-FIB (H): ICD-10-CM

## 2021-05-13 DIAGNOSIS — J44.9 CHRONIC OBSTRUCTIVE PULMONARY DISEASE, UNSPECIFIED COPD TYPE (H): ICD-10-CM

## 2021-05-13 DIAGNOSIS — Z78.9 TAKES DIETARY SUPPLEMENTS: ICD-10-CM

## 2021-05-13 DIAGNOSIS — M25.511 CHRONIC PAIN OF BOTH SHOULDERS: Primary | ICD-10-CM

## 2021-05-13 DIAGNOSIS — N40.0 BENIGN PROSTATIC HYPERPLASIA WITHOUT LOWER URINARY TRACT SYMPTOMS: ICD-10-CM

## 2021-05-13 DIAGNOSIS — I25.10 CORONARY ARTERY DISEASE INVOLVING NATIVE HEART WITHOUT ANGINA PECTORIS, UNSPECIFIED VESSEL OR LESION TYPE: ICD-10-CM

## 2021-05-13 PROCEDURE — 99606 MTMS BY PHARM EST 15 MIN: CPT | Performed by: PHARMACIST

## 2021-05-13 PROCEDURE — 99607 MTMS BY PHARM ADDL 15 MIN: CPT | Performed by: PHARMACIST

## 2021-05-13 NOTE — TELEPHONE ENCOUNTER
Received [employer] message from pt:     ----- Message -----   From: Erich Craven   Sent: 5/13/2021  10:33 AM CDT   To: [facility] Heart Core Nurse   Subject: Updates about my health                           Moose Dixon,                                 I am being treated for COPD by Dr. Kenny, Pulmonologist, and he started me on a new inhaler called Trelegy (200 mcg) for about 5 weeks and started a 12 day course of Prednisone.  Since then I have gained about 12 pounds and have fluid build up in my feet and legs.  My diet has remained low salt without any changes.  The  [facility] also elimination Spironolacton (25mg) about 2 months ago, which I has taking once every other day.  In talking with Kassi HERNANDEZ at [facility] she suggested I check in with you to see if you had any suggestions.  I am also preparing for shoulder surgery June 12th and have a preop appointment the end of this month.     Kind regards,               Jaun Craven     Also received VM from pt. Called back and spoke with pt. Pt reported weight 295 lbs on his home scale today. Pt said he has gained 12 lbs in the past 5 weeks and he wonders if his fluid retention is related to using the Trelegy inhaler. Pt said he was also on prednisone for 12 days about a month ago but is no longer taking prednisone. Pt denied any shortness of breath at rest or on exertion. Pt reported he is sleeping with 2 pillows. Denied PND, reports is using his CPAP at night. Pt reported swelling in his feet and ankles and stated he is elevating them when he can. Pt reported he is taking furosemide 40 mg twice daily and metolazone 2.5 mg twice daily as precribed. Pt's last BMP was on 4/21/21. Advised will update Destiny, NP and call back with recommendations.

## 2021-05-13 NOTE — PROGRESS NOTES
Medication Therapy Management (MTM) Encounter    ASSESSMENT:                            Medication Adherence/Access: No issues identified    Shoulder Pain: May benefit from trial of topical therapy - he'd prefer patches so could try OTC Lidocaine patches.    COPD: Improved.     Type 2 Diabetes: Stable.     Hypertension/A. Fib/HFpEF/H/o PE/DVT/CAD: May benefit from touching base with cardiology re: weight gain; may be related to spironolactone discontinuation.    Hyperlipidemia: Stable.  Patient is on high intensity statin which is indicated based on 2019 ACC/AHA guidelines for lipid management.       Anxiety/Depression:  Would benefit from discontinuing venlafaxine and increasing sertraline.  Will hope to taper off benzodiazepine therapy in the future.    Hypothyroidism: Stable. Last TSH is within normal limits.      BPH:  Plan in place.    Supplements:  Stable.    Immunizations:  Due for Shingrix, this was not discussed today.    PLAN:                            1.  Recommended trial of OTC Lidocaine 4% patches for his shoulders.  Discussed that these should be used for 12 hours, then off for 12 hours.  2.  Encouraged patient to reach out to cardiology re: weight gain.  3.  Stop venlafaxine and increase sertraline to 50mg daily  4.  Future considerations - Shingrix vaccine    Follow-up: Return in about 2 weeks (around 5/27/2021) for check in via [employer].    SUBJECTIVE/OBJECTIVE:                          Erich Craven is a 80 year old male called for a follow-up visit. He was referred to me from Dr. Heath.  Today's visit is a follow-up MTM visit from 3/9/2021.     Reason for visit: Routine f/up.    Tobacco: He reports that he has quit smoking. He has a 12.50 pack-year smoking history. He has never used smokeless tobacco.  Alcohol: history of alcohol dependence - sober since 1989    Medication Adherence/Access: no issues reported    Shoulder Pain:  He was originally scheduled for right shoulder replacement in  March, this was postponed due to COPD exacerbation/URI and is now scheduled for June 14th.  He reports the left side will be done after healing from right side.  He's been taking acetaminophen 1000mg four times daily, which he reports is somewhat effective but he wonders if there's anything else he could use.  He denies side effects of therapy.  He's aware of maximum dose of acetaminophen/day and stays at/below that.  He has never tried topical therapy for his shoulders, has had success using this elsewhere.    COPD: Current medications: albuterol MDI as needed and Trelegy Ellipta 200mcg daily.  He has been working with pulmonology - received Trelegy samples from them as it's not covered by his insurance. Patient rinses their mouth after using steroid inhaler.  He does use O2 overnight with CPAP - has upcoming at home sleep study scheduled.  Patient is not experiencing side effects.   Patient reports the following symptoms: none - reports symptoms are back to baseline  Patient does have an COPD Action Plan on file.   Has spirometry been completed: Yes     Type 2 Diabetes:  Currently taking no medications for diabetes management.   Symptoms of low blood sugar? none  Symptoms of high blood sugar? Polyuria (see below regarding BPH)  Eye exam: due  Foot exam: due  Diet/Exercise: He is a vegetarian  Aspirin: Taking 81mg daily and denies side effects  Statin: Yes: atorvastatin 40mg   ACEi/ARB: Yes: lisinopril 40mg.   Urine Albumin:   Lab Results   Component Value Date    UMALCR 27.79 (H) 02/19/2021      Lab Results   Component Value Date    A1C 5.8 02/19/2021    A1C 6.7 08/11/2020    A1C 5.8 02/27/2019    A1C 6.6 08/07/2018     Hypertension/A. Fib/HFpEF/H/o PE/DVT/CAD: Current medications include aspirin 81mg daily, warfarin as directed, carvedilol 12.5mg twice daily, diltiazem ER 300mg daily, furosemide 40mg twice daily, Imdur 30mg daily, lisinopril 40mg daily, metolazone 2.5mg twice daily (now taking prior to  furosemide)  Patient does not self-monitor blood pressure.  Patient reports no current medication side effects including signs and symptoms bruising/bleeding.  Spironolactone was discontinued in February  Last EF 2/27/19 - 66%  He does monitor his weight at home.  Baseline is about 282 lbs, he's currently at 290 lbs, feels he's been gaining over the last 2-3 weeks and feels he's having to monitor sodium VERY closely to not gain further.  He has not been in touch with cardiology.    BP Readings from Last 3 Encounters:   04/21/21 122/74   03/26/21 120/65   03/24/21 106/68      Potassium   Date Value Ref Range Status   04/21/2021 3.7 3.4 - 5.3 mmol/L Final      INR   Date Value Ref Range Status   04/19/2021 2.80 (H) 0.86 - 1.14 Final     Comment:     This test is intended for monitoring Coumadin therapy.  Results are not   accurate in patients with prolonged INR due to factor deficiency.         Hyperlipidemia: Current therapy includes atorvastatin 40mg daily.  Patient reports no significant myalgias or other side effects.  The ASCVD Risk score (Dundas DC Jr., et al., 2013) failed to calculate for the following reasons:    The 2013 ASCVD risk score is only valid for ages 40 to 79   Recent Labs   Lab Test 02/19/21  1528 08/11/20  1343   CHOL 138 131   HDL 44 44   LDL 52 56   TRIG 209* 154*     Anxiety/Depression:  Current regimens include clonazepam 0.5mg twice daily as needed (currently taking twice daily as he's nervous about upcoming surgery), sertraline 25mg once daily and venlafaxine 37.5mg once daily.  He reports being very cautious with clonazepam use - when he's tried reducing this in the past, he's noticed heightened anxiety.  He feels his mental health has been stable for the last 10 years or so.  He denies suicidal ideation.  He did not realize he was supposed to stop venlafaxine and further increase sertraline following our last visit.    Hypothyroidism: Patient is taking levothyroxine 175 mcg daily and an  additional 1/2 tablet on Sundays. Patient is having the following symptoms: none.   TSH   Date Value Ref Range Status   08/11/2020 0.79 0.40 - 4.00 mU/L Final       BPH:  Patient is taking tamsulosin 0.4mg daily - he stopped this following our last visit, but urinary symptoms worsened so he resumed this.  He's planning to see urology soon.    Supplements:  Current supplements include acetyl l-carnitine 500mg twice daily, CoQ10 100mg daily, a daily multivitamin, and taurine 500mg twice daily.  He finds these effective and prefers to continue taking.  No side effects noted.    Immunizations:  Patient has had 2 COVID-19 vaccine doses (Pfizer - 1/28/21 and 2/18/21).  He received high dose influenza vaccine fall 2020.  He's received Pneumovax since turning 64 and has received Prevnar.  Last tetanus was in 2012.  It does not appear he's received Shingrix.    Today's Vitals: There were no vitals taken for this visit.  ----------------    I spent 25 minutes with this patient today. A copy of the visit note was provided to the patient's primary care provider.    The patient was sent via [employer] a summary of these recommendations.     Kassi Glez, PharmD, [facility]  Medication Therapy Management Provider  Pager: [phone]     Telemedicine Visit Details  Type of service:  Telephone visit  Start Time: 9:39 AM  End Time: 10:04 AM  Originating Location (patient location): [place]  Distant Location (provider location):  [facility]      Medication Therapy Recommendations  Anxiety    Current Medication: sertraline (ZOLOFT) 25 MG tablet   Rationale: Does not understand instructions - Adherence - Adherence   Recommendation: Provide Education - Recommended increasing sertraline to 50mg daily and stopping venlafaxine   Status: Patient Agreed - Adherence/Education         Chronic pain of both shoulders    Rationale: Synergistic therapy - Needs additional medication therapy - Indication   Recommendation: Start Medication  - Lidocaine 4 % Patch   Status: Accepted - no CPA Needed

## 2021-05-14 NOTE — TELEPHONE ENCOUNTER
Left detailed voicemail with Destiny's recommendations. Informed pt to call back to touch base about the recommendations and if he has any questions or concerns. Left team 1's call back number.

## 2021-05-14 NOTE — TELEPHONE ENCOUNTER
Reviewed cardiology phone encounter. Patient with complaints of 12# weight gain with accompanying lower extremity edema. In the past he has underwent a RHC and his LE edema was noted to not be secondary to his HF as near normal pulmonary pressures and PCWP. Appears PCP has been the one prescribing and managing his diuretics. BMP from April shows stable kidney function and electrolytes (potassium 3.7), not on a potassium supplement.     RECOMMENDATIONS  1. Over the weekend he can take an additional 40mg of furosemide daily x 3 days.   2. Recommend follow up with PCP or cardiology for further evaluation/recommendations.   3. Reinforce low sodium diet, elevating lower extremities and wearing compression socks if he has them.   4. Advise patient to each potassium rich foods the next 3 days while on increased furosemide.   5. If further increases in diuretics needed he will need a BMP and likely potassium supplementation given his spironolactone has been stopped by his PCP in the past due to elevated potassium.     VAMSI Mckeon [facility]  Pager: [phone]

## 2021-05-14 NOTE — PATIENT INSTRUCTIONS
Recommendations from today's MTM visit:                                                    MTM (medication therapy management) is a service provided by a clinical pharmacist designed to help you get the most of out of your medicines.   Today we reviewed what your medicines are for, how to know if they are working, that your medicines are safe and how to make your medicine regimen as easy as possible.      1.  Consider trying Lidocaine 4% patches on your shoulders.  These would be placed on for 12 hours, and then left off for 12 hours.    2.  Please reach out to cardiology regarding the weight gain you've experienced    3.  Stop venlafaxine and increase sertraline to 50mg daily    Follow-up: Return in about 2 weeks (around 5/27/2021) for check in via [employer]    It was great to speak with you today.  I value your experience and would be very thankful for your time with providing feedback on our clinic survey. You may receive a survey via email or text message in the next few days.     To schedule another MTM appointment, please call the clinic directly or you may call the MTM scheduling line at [phone] or toll-free at [phone].     My Clinical Pharmacist's contact information:                                                      Please feel free to contact me with any questions or concerns you have.      Kassi Glez PharmD, [facility]  Medication Therapy Management Provider  Pager: [phone]     Whit Jackson PharmD  Medication Therapy Management Resident  Pager: [phone]

## 2021-05-17 ENCOUNTER — TRANSFERRED RECORDS (OUTPATIENT)
Dept: HEALTH INFORMATION MANAGEMENT | Facility: CLINIC | Age: 81
End: 2021-05-17

## 2021-05-20 ENCOUNTER — VIRTUAL VISIT (OUTPATIENT)
Dept: FAMILY MEDICINE | Facility: CLINIC | Age: 81
End: 2021-05-20
Payer: MEDICARE

## 2021-05-20 DIAGNOSIS — I50.32 CHRONIC DIASTOLIC HEART FAILURE (H): ICD-10-CM

## 2021-05-20 DIAGNOSIS — I48.91 ATRIAL FIBRILLATION (H): ICD-10-CM

## 2021-05-20 DIAGNOSIS — N18.32 STAGE 3B CHRONIC KIDNEY DISEASE (H): ICD-10-CM

## 2021-05-20 DIAGNOSIS — E66.01 MORBID OBESITY (H): ICD-10-CM

## 2021-05-20 DIAGNOSIS — I89.0 LYMPHEDEMA: Primary | ICD-10-CM

## 2021-05-20 PROCEDURE — 99214 OFFICE O/P EST MOD 30 MIN: CPT | Mod: 95 | Performed by: PHYSICIAN ASSISTANT

## 2021-05-20 RX ORDER — SPIRONOLACTONE 25 MG/1
25 TABLET ORAL EVERY OTHER DAY
Qty: 30 TABLET | Refills: 1 | Status: SHIPPED | OUTPATIENT
Start: 2021-05-20 | End: 2021-08-12

## 2021-05-20 NOTE — PROGRESS NOTES
Jaun is a 81 year old who is being evaluated via a billable video visit.      How would you like to obtain your AVS? MyChart  If the video visit is dropped, the invitation should be resent by: Text to cell phone: [phone]  Will anyone else be joining your video visit? No      Video Start Time: 1:32 PM        Subjective   Jaun is a 81 year old who presents for the following health issues: bilat LE edema/lymphedema    HPI     Chief Complaint   Patient presents with     Edema     Follow Up     Deep vein thrombosis (DVT) of distal vein of lower extremity   Pt notes he has chronic lymphedema but this worse in the past few weeks  Changes include spironolactone being discontinued in Feb due to hyperkalemia  Also started Trelegy and a prednisone taper for COPD  Called cards and they recd he increase lasix from 40mg bid to 40mg tid for 3 days. That did get about 5lbs of fluid off of him.    Denies cough, fever or chills.  He hasn't been wearing     Last Comprehensive Metabolic Panel:  Sodium   Date Value Ref Range Status   04/21/2021 138 133 - 144 mmol/L Final     Potassium   Date Value Ref Range Status   04/21/2021 3.7 3.4 - 5.3 mmol/L Final     Chloride   Date Value Ref Range Status   04/21/2021 99 94 - 109 mmol/L Final     Carbon Dioxide   Date Value Ref Range Status   04/21/2021 39 (H) 20 - 32 mmol/L Final     Anion Gap   Date Value Ref Range Status   04/21/2021 <1 (L) 3 - 14 mmol/L Final     Glucose   Date Value Ref Range Status   04/21/2021 104 (H) 70 - 99 mg/dL Final     Urea Nitrogen   Date Value Ref Range Status   04/21/2021 44 (H) 7 - 30 mg/dL Final     Creatinine   Date Value Ref Range Status   04/21/2021 1.02 0.66 - 1.25 mg/dL Final     GFR Estimate   Date Value Ref Range Status   04/21/2021 68 >60 mL/min/[1.73_m2] Final     Comment:     Non  GFR Calc  Starting 12/18/2018, serum creatinine based estimated GFR (eGFR) will be   calculated using the Chronic Kidney Disease Epidemiology  Collaboration   (CKD-EPI) equation.       Calcium   Date Value Ref Range Status   04/21/2021 9.2 8.5 - 10.1 mg/dL Final               Review of Systems   Constitutional, HEENT, cardiovascular, pulmonary, gi and gu systems are negative, except as otherwise noted.      Objective           Vitals:  No vitals were obtained today due to virtual visit.    Physical Exam   GENERAL: alert and no distress  EYES: Eyes grossly normal to inspection.  No discharge or erythema, or obvious scleral/conjunctival abnormalities.  RESP: No audible wheeze, cough, or visible cyanosis.  No visible retractions or increased work of breathing.    Both lower legs appear swollen with skin changes of venous insuff  NEURO: Cranial nerves grossly intact.  Mentation and speech appropriate for age.  PSYCH: Mentation appears normal, affect normal/bright, judgement and insight intact, normal speech and appearance well-groomed.      Assessment and Plan:     (I89.0) Lymphedema  (primary encounter diagnosis)  Comment: his weight was much better when he was on spironolactone which was discontinued when K went to 5.4. 3 months ago. Since this was a mild elevation, recd we restart spironolactone 25mg every other day and closely monitor his potassium and see what happens. He is also prescribed compression stockings since his are old.  He will  the DME ref and take the medical supply store in our building. Repeat BMP on June 1st when back here for labs.    Plan: spironolactone (ALDACTONE) 25 MG tablet,         Compression Sleeve/Stocking Order for DME -         ONLY FOR DME, Basic metabolic panel            (E66.01) Morbid obesity (H)  Comment:   Plan:     (I50.32) Chronic diastolic heart failure (H)  Comment:   Plan: reviewed cardiology notes in epic    (N18.32) Stage 3b chronic kidney disease  Comment:   Plan:       Dasha Garcia PA-C              Video-Visit Details    Type of service:  Video Visit    Video End Time: 2pm    Originating Location (pt.  Location): Home    Distant Location (provider location):  [facility] [place]     Platform used for Video Visit: Nayla

## 2021-05-20 NOTE — Clinical Note
Please abstract the following data from this visit with this patient into the appropriate field in Epic:    Tests that can be patient reported without a hard copy:    Eye exam with ophthalmology on this date: 11/05/2020 at [facility]

## 2021-05-21 DIAGNOSIS — G47.33 OBSTRUCTIVE SLEEP APNEA (ADULT) (PEDIATRIC): Primary | ICD-10-CM

## 2021-05-21 RX ORDER — WARFARIN SODIUM 5 MG/1
TABLET ORAL
Qty: 100 TABLET | Refills: 0 | Status: SHIPPED | OUTPATIENT
Start: 2021-05-21 | End: 2021-10-19

## 2021-05-21 NOTE — TELEPHONE ENCOUNTER
[facility]   Thank you,  Lanny GARNICA RN,BSN

## 2021-05-23 ENCOUNTER — HEALTH MAINTENANCE LETTER (OUTPATIENT)
Age: 81
End: 2021-05-23

## 2021-05-27 DIAGNOSIS — E03.9 HYPOTHYROIDISM, UNSPECIFIED TYPE: ICD-10-CM

## 2021-05-27 RX ORDER — TAMSULOSIN HYDROCHLORIDE 0.4 MG/1
0.4 CAPSULE ORAL DAILY
Qty: 90 CAPSULE | Refills: 0 | Status: SHIPPED | OUTPATIENT
Start: 2021-05-27 | End: 2021-09-07

## 2021-05-27 RX ORDER — LEVOTHYROXINE SODIUM 175 UG/1
175 TABLET ORAL DAILY
Qty: 100 TABLET | Refills: 0 | Status: SHIPPED | OUTPATIENT
Start: 2021-05-27 | End: 2021-08-30

## 2021-05-28 ENCOUNTER — HOSPITAL ENCOUNTER (INPATIENT)
Facility: CLINIC | Age: 81
Setting detail: SURGERY ADMIT
End: 2021-05-28
Attending: ORTHOPAEDIC SURGERY | Admitting: ORTHOPAEDIC SURGERY
Payer: MEDICARE

## 2021-05-31 DIAGNOSIS — Z11.59 ENCOUNTER FOR SCREENING FOR OTHER VIRAL DISEASES: ICD-10-CM

## 2021-06-07 DIAGNOSIS — F43.21 ADJUSTMENT DISORDER WITH DEPRESSED MOOD: ICD-10-CM

## 2021-06-07 RX ORDER — CLONAZEPAM 0.5 MG/1
TABLET ORAL
Qty: 30 TABLET | Refills: 0 | Status: SHIPPED | OUTPATIENT
Start: 2021-06-07 | End: 2021-06-09

## 2021-06-07 NOTE — TELEPHONE ENCOUNTER
Pending Prescriptions:                       Disp   Refills    clonazePAM (KLONOPIN) 0.5 MG tablet       60 tab*0            Sig: TAKE 1 TABLET BY MOUTH TWICE A DAY AS NEEDED          Last Written Prescription Date:  5-9-2021  Last Fill Quantity: 60,   # refills: 0  Last Office Visit: 5- [facility], 5- Kristina tracy, [phone] [facility]  Future Office visit:   6-9-2021 [facility]    Routing refill request to provider for review/approval because:  Drug not on the [facility], [facility] or [facility] refill protocol or controlled substance    Africa Kruger RT (R)

## 2021-06-07 NOTE — PROGRESS NOTES
Patient came in for a machine troubleshoot. He said it has been making a loud hair dryer like noise. Looked at patient humidifier and the seal between the machine and the water chamber had fallen off. Put this back on and the noise was fixed. Everything else was operating correctly with the machine, he had no other complaints. He was also needing to reschedule his F2F compliance follow up. Reschedule with Bennett Goltz for 01/22/2020 at 3:30pm.    Graft Donor Site Bandage (Optional-Leave Blank If You Don't Want In Note): Steri-strips and a pressure bandage were applied to the donor site.

## 2021-06-08 NOTE — TELEPHONE ENCOUNTER
He's still on this dose - he's anxious about upcoming surgery and is planning to start weaning following surgery.    Kassi Glez, PharmD, [facility]  Medication Therapy Management Provider  Pager: [phone]

## 2021-06-09 ENCOUNTER — TELEPHONE (OUTPATIENT)
Dept: FAMILY MEDICINE | Facility: CLINIC | Age: 81
End: 2021-06-09

## 2021-06-09 ENCOUNTER — APPOINTMENT (OUTPATIENT)
Dept: LAB | Facility: CLINIC | Age: 81
End: 2021-06-09
Payer: MEDICARE

## 2021-06-09 ENCOUNTER — OFFICE VISIT (OUTPATIENT)
Dept: FAMILY MEDICINE | Facility: CLINIC | Age: 81
End: 2021-06-09
Payer: MEDICARE

## 2021-06-09 VITALS
HEART RATE: 79 BPM | BODY MASS INDEX: 39.82 KG/M2 | DIASTOLIC BLOOD PRESSURE: 68 MMHG | WEIGHT: 294 LBS | SYSTOLIC BLOOD PRESSURE: 116 MMHG | OXYGEN SATURATION: 92 % | TEMPERATURE: 97.7 F | HEIGHT: 72 IN

## 2021-06-09 DIAGNOSIS — F43.21 ADJUSTMENT DISORDER WITH DEPRESSED MOOD: ICD-10-CM

## 2021-06-09 DIAGNOSIS — E03.9 HYPOTHYROIDISM, UNSPECIFIED TYPE: ICD-10-CM

## 2021-06-09 DIAGNOSIS — I25.10 CORONARY ARTERY DISEASE INVOLVING NATIVE HEART WITHOUT ANGINA PECTORIS, UNSPECIFIED VESSEL OR LESION TYPE: ICD-10-CM

## 2021-06-09 DIAGNOSIS — Z95.0 CARDIAC PACEMAKER IN SITU: ICD-10-CM

## 2021-06-09 DIAGNOSIS — I10 ESSENTIAL HYPERTENSION: ICD-10-CM

## 2021-06-09 DIAGNOSIS — M19.011 ARTHRITIS OF RIGHT SHOULDER REGION: ICD-10-CM

## 2021-06-09 DIAGNOSIS — I89.0 LYMPHEDEMA: ICD-10-CM

## 2021-06-09 DIAGNOSIS — J42 CHRONIC BRONCHITIS, UNSPECIFIED CHRONIC BRONCHITIS TYPE (H): ICD-10-CM

## 2021-06-09 DIAGNOSIS — Z01.818 PRE-OPERATIVE GENERAL PHYSICAL EXAMINATION: Primary | ICD-10-CM

## 2021-06-09 DIAGNOSIS — E66.01 MORBID OBESITY (H): ICD-10-CM

## 2021-06-09 DIAGNOSIS — N18.32 STAGE 3B CHRONIC KIDNEY DISEASE (H): ICD-10-CM

## 2021-06-09 DIAGNOSIS — G47.33 OSA (OBSTRUCTIVE SLEEP APNEA): ICD-10-CM

## 2021-06-09 DIAGNOSIS — M25.511 ACUTE PAIN OF RIGHT SHOULDER: ICD-10-CM

## 2021-06-09 DIAGNOSIS — F41.9 ANXIETY: Primary | ICD-10-CM

## 2021-06-09 DIAGNOSIS — I50.32 CHRONIC DIASTOLIC HEART FAILURE (H): ICD-10-CM

## 2021-06-09 PROCEDURE — 99215 OFFICE O/P EST HI 40 MIN: CPT | Performed by: INTERNAL MEDICINE

## 2021-06-09 RX ORDER — CLONAZEPAM 0.5 MG/1
TABLET ORAL
Qty: 30 TABLET | Refills: 0 | Status: SHIPPED | OUTPATIENT
Start: 2021-06-09 | End: 2021-07-01

## 2021-06-09 ASSESSMENT — MIFFLIN-ST. JEOR: SCORE: 2071.58

## 2021-06-09 NOTE — TELEPHONE ENCOUNTER
Nurse from Dr Ama Dixon's office (Cardiology) called with question.     Patient contacted that clinic today saying that during Preop today Dr Heath advised patient to contact his Cardiologist for recommendation if OK for patient to proceed with scheduled shoulder surgery?      Cardiology asking if patient needs appointment with Cardiology?  Or if a general opinion needed by patient's Cardiologist that it's OK for patient to proceed with surgery?     Dr Heath, please advise what is specifically needed from Cardiology regarding patient's upcoming surgery clearance?      Dr Umer Dixon's clinic number:  [phone]    Lanny BARGER, RN      June 9, 2021  2:18 PM

## 2021-06-09 NOTE — PROGRESS NOTES
[address], [address]  [facility] [address]  Phone: [phone]  Primary Provider: Evin Garcia  Pre-op Performing Provider: EVIN GARCIA      PREOPERATIVE EVALUATION:  Today's date: 6/9/2021    Erich Craven is a 81 year old male who presents for a preoperative evaluation.    Surgical Information:  General Information    Date: 6/28/2021 Time: 10:05 AM Status: Scheduled   Location:  OR Room: [place] [address] Service: Orthopedics   Patient class: Surgery Admit Case classification:          Panel Information    Panel 1    Provider Role   Wayne Kumar MD Primary    Procedure Laterality Anesthesia   RIGHT REVERSE TOTAL SHOULDER ARTHROPLASTY Right Combined General with Interscalene Block               Where patient plans to recover: At home with family  Fax number for surgical facility: Note does not need to be faxed, will be available electronically in Epic.    Type of Anesthesia Anticipated: Combined General with Interscalene Block    Assessment & Plan     The proposed surgical procedure is considered INTERMEDIATE risk.    Problem List Items Addressed This Visit        Respiratory    RIN (obstructive sleep apnea)    COPD (chronic obstructive pulmonary disease) (H)       Digestive    Morbid obesity (H)       Endocrine    Hypothyroidism    Relevant Orders    TSH with free T4 reflex       Circulatory    Chronic diastolic heart failure (H)    Relevant Orders    BNP-N terminal pro    Essential hypertension    Relevant Orders    **Basic metabolic panel FUTURE anytime    CBC with platelets    CAD (coronary artery disease)       Urinary    Chronic kidney disease, stage 3       Other    Lymphedema    Cardiac pacemaker in situ      Other Visit Diagnoses     Pre-operative general physical examination    -  Primary    Arthritis of right shoulder region              Risks and Recommendations:  The patient has the following additional risks and recommendations for  perioperative complications:   - Consult Hospitalist / IM to assist with post-op medical management   - Morbid obesity (BMI >40)  Cardiovascular:   - Cardiology consulted   Pulmonary:    - Incentive spirometry post-op   - Consider Respiratory Therapy (Respiratory Care IP Consult) post-op  Obstructive Sleep Apnea:   Uses CPAP post op    Medication Instructions:   - aspirin: Patient is at increased risk of thrombosis (e.g. stenting within the last year), continue aspirin without modification. Consider consultation with cardiology.    - warfarin: Thromboembolic risk is low (<4%/year). HOLD warfarin for 5 days without bridging before procedure.    - ACE/ARB: HOLD due to exceptional risk of hypotension during surgery.    - Beta Blockers: Continue taking on the day of surgery.   - Calcium Channel Blockers: May be continued on the day of surgery.   - Diuretics: HOLD on the day of surgery.   - Statins: Continue taking on the day of surgery.    - LABA, inhaled corticosteroid, long-acting anticholinergics: Continue without modification.   - rescue Inhaler: Continue PRN. Bring to hospital on the day of surgery.    RECOMMENDATION:  APPROVAL GIVEN to proceed with proposed procedure pending review of diagnostic evaluation.              Subjective     HPI related to upcoming procedure: Right shoulder arthroplasty.  History of coronary heart disease, COPD, mild pulmonary hypertension, paroxysmal A. fib, chronic kidney disease and history of DVT.  Maintained on chronic anticoagulation.  He denies currently any shortness of breath or difficulty breathing, has a new inhaler called Trelegy that is helping with his breathing symptoms.  Has obstructive sleep apnea uses CPAP daily, has some mild O2 desats during sleep in the high 80s low 90s.  His oxygen runs around 90 to 93% during the day on room air.  Denies any chest pain, dyspnea otherwise he does not exert himself much during the day; he has chronic lymphedema.  Had history of blood  clot after his right knee surgery in the past.  Is maintained on chronic anticoagulation with Coumadin, follows with Coumadin clinic.  Blood pressure seems well controlled.  Denies any palpitations, presyncope or syncope.    Preop Questions 6/4/2021   1. Have you ever had a heart attack or stroke? No   2. Have you ever had surgery on your heart or blood vessels, such as a stent placement, a coronary artery bypass, or surgery on an artery in your head, neck, heart, or legs? YES -    3. Do you have chest pain with activity? No   4. Do you have a history of  heart failure? No   5. Do you currently have a cold, bronchitis or symptoms of other infection? No   6. Do you have a cough, shortness of breath, or wheezing? No   7. Do you or anyone in your family have previous history of blood clots? YES -blood clot after right knee surgery   8. Do you or does anyone in your family have a serious bleeding problem such as prolonged bleeding following surgeries or cuts? No   9. Have you ever had problems with anemia or been told to take iron pills? No   10. Have you had any abnormal blood loss such as black, tarry or bloody stools? No   11. Have you ever had a blood transfusion? No   12. Are you willing to have a blood transfusion if it is medically needed before, during, or after your surgery? Yes   13. Have you or any of your relatives ever had problems with anesthesia? YES -    14. Do you have sleep apnea, excessive snoring or daytime drowsiness? YES -on CPAP machine   14a. Do you have a CPAP machine? Yes   15. Do you have any artifical heart valves or other implanted medical devices like a pacemaker, defibrillator, or continuous glucose monitor? YES -cardiac stents.   15a. What type of device do you have? Pacemaker   15b. Name of the clinic that manages your device:  [facility]   16. Do you have artificial joints? YES -right knee   17. Are you allergic to latex? No       Health Care Directive:  Patient does not have a Health Care  Directive or Living Will: Discussed advance care planning with patient; information given to patient to review.    Preoperative Review of :   reviewed - no record of controlled substances prescribed.      Status of Chronic Conditions:  A-FIB - Patient has a longstanding history of chronic A-fib currently on rate control. Current treatment regimen includes Warfarin for stroke prevention and denies significant symptoms of lightheadedness, palpitations or dyspnea.     CAD - Patient has a longstanding history of moderate-severe CAD. Patient denies recent chest pain or NTG use, denies exercise induced dyspnea or PND. Last Stress test na, EKG 2/ 2020    CHF - Patient has a longstanding history of moderate-severe CHF. Exacerbating conditions include ischemic heart disease, hypertension, COPD, dystolic dysfunction and atrial fibrilation. Currently the patient's condition is improving. Current treatment regimen includes ACE inhibitor, long acting nitrate, Coreg, calcium channel blocker, ASA, diuretic and spirolactone. The patient denies chest pain, edema, orthopnea, SOB or recent weight gain. Last Echocardiogram 4 2019 EKG .     COPD - Patient has a longstanding history of moderate-severe COPD . Patient has been doing well overall noting EDEMA and DYSPNEA and continues on medication regimen consisting of Trelegy Ellipta and albuterol without adverse reactions or side effects.    DIABETES - Patient has a longstanding history of DiabetesType Type II . Patient is being treated with diet and denies significant side effects. Control has been good. Complicating factors include but are not limited to: hypertension, hyperlipidemia, CAD/PVD and morbid obesity .     HYPERLIPIDEMIA - Patient has a long history of significant Hyperlipidemia requiring medication for treatment with recent good control. Patient reports no problems or side effects with the medication.     HYPERTENSION - Patient has longstanding history of HTN ,  currently denies any symptoms referable to elevated blood pressure. Specifically denies chest pain, palpitations, dyspnea, orthopnea, PND or peripheral edema. Blood pressure readings have been in normal range. Current medication regimen is as listed below. Patient denies any side effects of medication.     RENAL INSUFFICIENCY - Patient has a longstanding history of moderate-severe chronic renal insufficiency. Last Cr 1.02.     SLEEP PROBLEM - Patient has a longstanding history of snoring.. Patient has tried OTC medications with limited success.       Review of Systems  Constitutional, neuro, ENT, endocrine, pulmonary, cardiac, gastrointestinal, genitourinary, musculoskeletal, integument and psychiatric systems are negative, except as otherwise noted.    Patient Active Problem List    Diagnosis Date Noted     Primary osteoarthritis of right shoulder 03/05/2021     Priority: Medium     Added automatically from request for surgery [id number]       Chronic kidney disease, stage 3 12/14/2020     Priority: Medium     Diabetes mellitus, type 2 (H) 08/12/2020     Priority: Medium     Deep vein thrombosis (DVT) of distal vein of lower extremity, unspecified chronicity, unspecified laterality (H) 05/08/2020     Priority: Medium     Anxiety 03/02/2020     Priority: Medium     Patient is followed by [facility] for ongoing prescription of benzodiazepines.  All refills should be approved by this provider, or covering partner.    Medication(s): Pending Prescriptions:                       Disp   Refills    clonazePAM (KLONOPIN) 0.5 MG tablet [Phar*60 tab*0            Sig: TAKE 1 TABLET BY MOUTH TWICE A DAY AS NEEDED    .   Maximum quantity per month: 60  Clinic visit frequency required: Q 6  months     Controlled substance agreement on file: No  Benzodiazepine use reviewed by psychiatry:      Last [facility] website verification:  05/06/20   [email or web address]           Status post coronary angiogram  11/05/2019     Priority: Medium     Heart failure with preserved ejection fraction, unspecified HF chronicity (H) 10/10/2019     Priority: Medium     Added automatically from request for surgery [id number]       Muscle tension headache 05/21/2019     Priority: Medium     PE (pulmonary thromboembolism) (H)      Priority: Medium     HTN (hypertension)      Priority: Medium     Chronic diastolic heart failure (H)      Priority: Medium     Dyspnea      Priority: Medium     Fatigue      Priority: Medium     RIN (obstructive sleep apnea)      Priority: Medium     COPD (chronic obstructive pulmonary disease) (H)      Priority: Medium     Hypothyroidism      Priority: Medium     Paroxysmal A-fib (H)      Priority: Medium     HLD (hyperlipidemia)      Priority: Medium     Lymphedema      Priority: Medium     (HFpEF) heart failure with preserved ejection fraction (H)      Priority: Medium     Essential hypertension      Priority: Medium     Cardiac pacemaker in situ      Priority: Medium     DVT (deep venous thrombosis) (H)      Priority: Medium     CAD (coronary artery disease)      Priority: Medium     PCI w/ SUMMER in 2014       Obesity, Class III, BMI 40-49.9 (morbid obesity) (H)      Priority: Medium     Morbid obesity (H) 04/14/2019     Priority: Medium      Past Medical History:   Diagnosis Date     (HFpEF) heart failure with preserved ejection fraction (H)      BPH with urinary obstruction      CAD (coronary artery disease)     PCI w/ SUMMER in 2014     Cardiac pacemaker in situ      Chronic diastolic heart failure (H)      COPD (chronic obstructive pulmonary disease) (H)      DVT (deep venous thrombosis) (H)      Dyspnea      Essential hypertension      Fatigue      HLD (hyperlipidemia)      Hypothyroidism      Lymphedema      Obesity, Class III, BMI 40-49.9 (morbid obesity) (H)      RIN (obstructive sleep apnea)     has refused CPAP     Paroxysmal A-fib (H)      PE (pulmonary thromboembolism) (H)      Past Surgical History:    Procedure Laterality Date     cardiac stenting       CV RIGHT HEART CATH MEASUREMENTS RECORDED N/A 11/5/2019    Procedure: Right Heart Cath;  Surgeon: Roberto Hernandez MD;  Location:  HEART CARDIAC CATH LAB     ENT SURGERY      tonsillectomy     IMPLANT PACEMAKER       ORTHOPEDIC SURGERY Right 2012    knee replacement     Current Outpatient Medications   Medication Sig Dispense Refill     acetaminophen (TYLENOL) 500 MG tablet Take 1,000 mg by mouth 4 times daily        Acetylcarnitine HCl (ACETYL L-CARNITINE) 500 MG CAPS Take 1 capsule by mouth 2 times daily       albuterol (PROAIR HFA/PROVENTIL HFA/VENTOLIN HFA) 108 (90 Base) MCG/ACT inhaler Inhale 2 puffs into the lungs every 4 hours as needed for shortness of breath / dyspnea or wheezing 1 Inhaler 0     aspirin (ASA) 81 MG chewable tablet Take 1 tablet by mouth daily       atorvastatin (LIPITOR) 40 MG tablet TAKE 1 TABLET BY MOUTH EVERY EVENING 90 tablet 2     carvedilol (COREG) 12.5 MG tablet Take 1 tablet (12.5 mg) by mouth 2 times daily 180 tablet 3     co-enzyme Q-10 100 MG CAPS capsule Take 100 mg by mouth daily       diltiazem ER COATED BEADS (CARDIZEM CD/CARTIA XT) 300 MG 24 hr capsule TAKE 1 CAPSULE BY MOUTH EVERY DAY 90 capsule 3     Fluticasone-Umeclidin-Vilanterol (TRELEGY ELLIPTA) 200-62.5-25 MCG/INH oral inhaler Inhale 1 puff into the lungs daily       furosemide (LASIX) 20 MG tablet Take 2 tablets (40 mg) by mouth 2 times daily 360 tablet 3     isosorbide mononitrate (IMDUR) 30 MG 24 hr tablet Take 1 tablet (30 mg) by mouth daily 90 tablet 3     levothyroxine (SYNTHROID/LEVOTHROID) 175 MCG tablet Take 1 tablet (175 mcg) by mouth daily Additional 1/2 tablet on Sundays 100 tablet 0     lisinopril (ZESTRIL) 40 MG tablet Take 1 tablet (40 mg) by mouth daily 90 tablet 3     metolazone (ZAROXOLYN) 2.5 MG tablet Take 1 tablet (2.5 mg) by mouth 2 times daily 180 tablet 3     Multiple vitamin TABS Take 1 tablet by mouth daily       order for DME Oxygen  2 Li/min  Continuous. At night bled into PAP device. In the daytime, NC with bubbler: requiring portability 1 Device 0     sertraline (ZOLOFT) 25 MG tablet Take 2 tablets (50 mg) by mouth daily 180 tablet 1     tamsulosin (FLOMAX) 0.4 MG capsule Take 1 capsule (0.4 mg) by mouth daily 90 capsule 0     Taurine 500 MG CAPS Take 500 mg by mouth 2 times daily       warfarin ANTICOAGULANT (COUMADIN) 5 MG tablet Take 1.5 tablets (7.5 mg) on Fridays and take 1 tablet (5 mg) all other days or as directed by the INR clinic 100 tablet 0     acetaminophen-codeine (TYLENOL #3) 300-30 MG tablet Take 1 tablet by mouth every 8 hours as needed for severe pain 10 tablet 0     clonazePAM (KLONOPIN) 0.5 MG tablet TAKE 1 TABLET BY MOUTH TWICE  A DAY AS NEEDED, PLEASE KEEP INTAKE TO MINIMAL 30 tablet 0     spironolactone (ALDACTONE) 25 MG tablet Take 1 tablet (25 mg) by mouth every other day 30 tablet 1       Allergies   Allergen Reactions     No Known Allergies         Social History     Tobacco Use     Smoking status: Former Smoker     Packs/day: 0.50     Years: 25.00     Pack years: 12.50     Smokeless tobacco: Never Used     Tobacco comment: quit in 1989   Substance Use Topics     Alcohol use: No     Frequency: Never     Comment: quit in 1989; recovering     Family History   Problem Relation Age of Onset     Hypertension Father      History   Drug Use No         Objective     /68 (BP Location: Right arm, Patient Position: Sitting, Cuff Size: Adult Large)   Pulse 79   Temp 97.7  F (36.5  C) (Temporal)   Ht 1.829 m (6')   Wt 133.4 kg (294 lb)   SpO2 92%   BMI 39.87 kg/m      Physical Exam    GENERAL APPEARANCE: healthy, alert and no distress    GENERAL APPEARANCE: obese     EYES: EOMI,  PERRL     HENT: ear canals and TM's normal and nose and mouth without ulcers or lesions     NECK: no adenopathy, no asymmetry, masses, or scars and thyroid normal to palpation     RESP: lungs clear to auscultation - no rales, rhonchi or  wheezes     CV: regular rates and rhythm, normal S1 S2, no S3 or S4 and no murmur, click or rub     ABDOMEN:  soft, nontender, no HSM or masses and bowel sounds normal     ABDOMEN: obese     MS: extremities normal- no gross deformities noted, LE edema to the mid chin and normal range of motion knees/ankles     SKIN: no suspicious lesions or rashes     NEURO: Normal strength and tone, sensory exam grossly normal, mentation intact and speech normal     PSYCH: mentation appears normal. and affect normal/bright     LYMPHATICS: No cervical adenopathy    Recent Labs   Lab Test 04/21/21  0931 04/19/21  1029 03/31/21  0943 03/24/21  1805 03/24/21  1805 03/04/21  1932 03/02/21  1405 02/19/21  1528 02/19/21  1528 08/11/20  1343 08/11/20  1343   HGB  --   --   --   --  11.9* 11.4*  --   --  12.0*  --  13.6   PLT  --   --   --   --  208 185  --   --  200  --  247   INR  --  2.80* 2.70*   < >  --   --   --    < >  --    < >  --      --   --   --   --   --  136  --  138   < > 134   POTASSIUM 3.7  --   --   --   --   --  4.5  --  5.4*   < > 6.0*   CR 1.02  --   --   --   --   --  1.42*  --  1.34*   < > 1.36*   A1C  --   --   --   --   --   --   --   --  5.8*  --  6.7*    < > = values in this interval not displayed.        Diagnostics:  Labs pending at this time.  Results will be reviewed when available.   EKG required for known coronary heart disease and not completed in the last 90 days.     Revised Cardiac Risk Index (RCRI):  The patient has the following serious cardiovascular risks for perioperative complications:   - Coronary Artery Disease (MI, positive stress test, angina, Qs on EKG) = 1 point     RCRI Interpretation: 1 point: Class II (low risk - 0.9% complication rate)           Signed Electronically by: Anne Heath MD  Copy of this evaluation report is provided to requesting physician.

## 2021-06-10 ENCOUNTER — CARE COORDINATION (OUTPATIENT)
Dept: CARDIOLOGY | Facility: CLINIC | Age: 81
End: 2021-06-10

## 2021-06-10 ENCOUNTER — NURSE TRIAGE (OUTPATIENT)
Dept: NURSING | Facility: CLINIC | Age: 81
End: 2021-06-10

## 2021-06-10 DIAGNOSIS — I25.10 CORONARY ARTERY DISEASE INVOLVING NATIVE CORONARY ARTERY OF NATIVE HEART WITHOUT ANGINA PECTORIS: Primary | ICD-10-CM

## 2021-06-10 NOTE — TELEPHONE ENCOUNTER
Pt called in to return a call.  Inform the Pt no one message for him.  Inform new medication is sent to the pharmacy.  Care advice given per protocol.  The mother states she will take the Pt to the [facility] tomorrow.  Patient agrees with care advice given.   Agreed to call back if he has additional symptoms or questions.      Gurpreet Anderson Nurse Advisor 6/10/2021 6:05 PM

## 2021-06-10 NOTE — TELEPHONE ENCOUNTER
Called Cardiology with Dr Heath's recommendation for Cardiac clearance preop.     Card with contact and schedule patient.      Lanny BARGER, RN      Iliana 10, 2021  10:33 AM

## 2021-06-10 NOTE — PROGRESS NOTES
Received call from pt stating that he is scheduled for shoulder surgery 6/30/21 & Dr. Heath is requesting cardiac clearance for surgery. Will message Dr. Portillo to review if any testing is needed prior to appointment for cardiac clearance. Jessica PRETTY

## 2021-06-11 ENCOUNTER — MYC REFILL (OUTPATIENT)
Dept: FAMILY MEDICINE | Facility: CLINIC | Age: 81
End: 2021-06-11

## 2021-06-11 ENCOUNTER — MYC MEDICAL ADVICE (OUTPATIENT)
Dept: FAMILY MEDICINE | Facility: CLINIC | Age: 81
End: 2021-06-11

## 2021-06-11 DIAGNOSIS — M25.511 ACUTE PAIN OF RIGHT SHOULDER: ICD-10-CM

## 2021-06-14 ENCOUNTER — ANTICOAGULATION THERAPY VISIT (OUTPATIENT)
Dept: NURSING | Facility: CLINIC | Age: 81
End: 2021-06-14

## 2021-06-14 ENCOUNTER — MYC MEDICAL ADVICE (OUTPATIENT)
Dept: FAMILY MEDICINE | Facility: CLINIC | Age: 81
End: 2021-06-14

## 2021-06-14 DIAGNOSIS — N18.32 STAGE 3B CHRONIC KIDNEY DISEASE (H): Primary | ICD-10-CM

## 2021-06-14 DIAGNOSIS — R82.90 NONSPECIFIC FINDING ON EXAMINATION OF URINE: Primary | ICD-10-CM

## 2021-06-14 DIAGNOSIS — I48.0 PAROXYSMAL A-FIB (H): ICD-10-CM

## 2021-06-14 DIAGNOSIS — I26.99 PE (PULMONARY THROMBOEMBOLISM) (H): ICD-10-CM

## 2021-06-14 DIAGNOSIS — M25.511 ACUTE PAIN OF RIGHT SHOULDER: ICD-10-CM

## 2021-06-14 DIAGNOSIS — I82.4Z9 DEEP VEIN THROMBOSIS (DVT) OF DISTAL VEIN OF LOWER EXTREMITY, UNSPECIFIED CHRONICITY, UNSPECIFIED LATERALITY (H): ICD-10-CM

## 2021-06-14 DIAGNOSIS — I50.32 CHRONIC DIASTOLIC HEART FAILURE (H): ICD-10-CM

## 2021-06-14 DIAGNOSIS — E03.9 HYPOTHYROIDISM, UNSPECIFIED TYPE: ICD-10-CM

## 2021-06-14 DIAGNOSIS — N18.32 STAGE 3B CHRONIC KIDNEY DISEASE (H): ICD-10-CM

## 2021-06-14 DIAGNOSIS — I10 ESSENTIAL HYPERTENSION: ICD-10-CM

## 2021-06-14 LAB
ALBUMIN SERPL-MCNC: 3.6 G/DL (ref 3.4–5)
ALBUMIN UR-MCNC: 30 MG/DL
ANION GAP SERPL CALCULATED.3IONS-SCNC: 4 MMOL/L (ref 3–14)
APPEARANCE UR: ABNORMAL
BILIRUB UR QL STRIP: NEGATIVE
BUN SERPL-MCNC: 62 MG/DL (ref 7–30)
CALCIUM SERPL-MCNC: 9.2 MG/DL (ref 8.5–10.1)
CAPILLARY BLOOD COLLECTION: NORMAL
CHLORIDE SERPL-SCNC: 105 MMOL/L (ref 94–109)
CO2 SERPL-SCNC: 28 MMOL/L (ref 20–32)
COLOR UR AUTO: YELLOW
CREAT SERPL-MCNC: 1.81 MG/DL (ref 0.66–1.25)
ERYTHROCYTE [DISTWIDTH] IN BLOOD BY AUTOMATED COUNT: 15.6 % (ref 10–15)
GFR SERPL CREATININE-BSD FRML MDRD: 34 ML/MIN/{1.73_M2}
GLUCOSE SERPL-MCNC: 108 MG/DL (ref 70–99)
GLUCOSE UR STRIP-MCNC: NEGATIVE MG/DL
HCT VFR BLD AUTO: 36.7 % (ref 40–53)
HGB BLD-MCNC: 12.2 G/DL (ref 13.3–17.7)
HGB UR QL STRIP: ABNORMAL
INR PPP: 2.8 (ref 0.86–1.14)
KETONES UR STRIP-MCNC: NEGATIVE MG/DL
LEUKOCYTE ESTERASE UR QL STRIP: ABNORMAL
MCH RBC QN AUTO: 33.2 PG (ref 26.5–33)
MCHC RBC AUTO-ENTMCNC: 33.2 G/DL (ref 31.5–36.5)
MCV RBC AUTO: 100 FL (ref 78–100)
NITRATE UR QL: NEGATIVE
NT-PROBNP SERPL-MCNC: 64 PG/ML (ref 0–450)
PH UR STRIP: 5 PH (ref 5–7)
PHOSPHATE SERPL-MCNC: 4.4 MG/DL (ref 2.5–4.5)
PLATELET # BLD AUTO: 195 10E9/L (ref 150–450)
POTASSIUM SERPL-SCNC: 4.7 MMOL/L (ref 3.4–5.3)
PROT UR-MCNC: 0.41 G/L
PROT/CREAT 24H UR: 0.38 G/G CR (ref 0–0.2)
PTH-INTACT SERPL-MCNC: 107 PG/ML (ref 18–80)
RBC # BLD AUTO: 3.68 10E12/L (ref 4.4–5.9)
RBC #/AREA URNS AUTO: ABNORMAL /HPF
SODIUM SERPL-SCNC: 137 MMOL/L (ref 133–144)
SOURCE: ABNORMAL
SP GR UR STRIP: 1.02 (ref 1–1.03)
TSH SERPL DL<=0.005 MIU/L-ACNC: 0.71 MU/L (ref 0.4–4)
UROBILINOGEN UR STRIP-ACNC: 0.2 EU/DL (ref 0.2–1)
WBC # BLD AUTO: 10.2 10E9/L (ref 4–11)
WBC #/AREA URNS AUTO: >100 /HPF

## 2021-06-14 PROCEDURE — 85027 COMPLETE CBC AUTOMATED: CPT | Performed by: INTERNAL MEDICINE

## 2021-06-14 PROCEDURE — 84443 ASSAY THYROID STIM HORMONE: CPT | Performed by: INTERNAL MEDICINE

## 2021-06-14 PROCEDURE — 82306 VITAMIN D 25 HYDROXY: CPT | Performed by: INTERNAL MEDICINE

## 2021-06-14 PROCEDURE — 36415 COLL VENOUS BLD VENIPUNCTURE: CPT | Performed by: INTERNAL MEDICINE

## 2021-06-14 PROCEDURE — 83970 ASSAY OF PARATHORMONE: CPT | Performed by: INTERNAL MEDICINE

## 2021-06-14 PROCEDURE — 87086 URINE CULTURE/COLONY COUNT: CPT | Performed by: INTERNAL MEDICINE

## 2021-06-14 PROCEDURE — 81001 URINALYSIS AUTO W/SCOPE: CPT | Performed by: INTERNAL MEDICINE

## 2021-06-14 PROCEDURE — 80069 RENAL FUNCTION PANEL: CPT | Performed by: INTERNAL MEDICINE

## 2021-06-14 PROCEDURE — 83880 ASSAY OF NATRIURETIC PEPTIDE: CPT | Performed by: INTERNAL MEDICINE

## 2021-06-14 PROCEDURE — 84156 ASSAY OF PROTEIN URINE: CPT | Performed by: INTERNAL MEDICINE

## 2021-06-14 PROCEDURE — 85610 PROTHROMBIN TIME: CPT | Performed by: INTERNAL MEDICINE

## 2021-06-14 NOTE — TELEPHONE ENCOUNTER
Sent patient Kueski message with the information below.    Georgiana Jennings RN  [facility]

## 2021-06-14 NOTE — TELEPHONE ENCOUNTER
Reason for Call: Request for an order or referral:    Order or referral being requested: Acetaminophen COD 3    Date needed: as soon as possible    Has the patient been seen by the PCP for this problem? YES    Additional comments: Pt stated that he was only written for 10 tablets. He wanted to know if he could get more before his appt for his surgery 6/28/2021    Phone number Patient can be reached at:  Cell number on file:    Telephone Information:   Mobile [phone]       Best Time:  anytime    Can we leave a detailed message on this number?  YES    Call taken on 6/14/2021 at 2:32 PM by Magaly Robertson

## 2021-06-14 NOTE — TELEPHONE ENCOUNTER
See mychart encounter on 6/11/21. Will sign this encounter.     Per Dr Heath Rx response: TYLENOL# 3 Every 8 hrs only strictly as needed for severe pain >8-9/10. can take tylenol prn for pain,. Should not be taking every 8 hrs, as he is already on clonazepam., his orthopedics surgeon can prescribe pain medicine if recommended    Georgiana Jennings RN  [facility]

## 2021-06-14 NOTE — TELEPHONE ENCOUNTER
TYLENOL# 3 Every 8 hrs only strictly as needed for severe pain >8-9/10. can take tylenol prn for pain,. Should not be taking every 8 hrs, as he is already on clonazepam., his orthopedics surgeon can prescribe pain medicine if recommended.

## 2021-06-14 NOTE — PROGRESS NOTES
Called pt with recommendations from Dr. Portillo. Order in chart & pt transferred to scheduling to arrange.   Pt scheduled to see Dr. Portillo 6/22/21, left message for pt with date, time & location of apt & asked he call back with questions or concerns. Jessica PRETTY

## 2021-06-14 NOTE — TELEPHONE ENCOUNTER
Responded to patients mychart reiterating     Per Dr Heath Rx response: TYLENOL# 3 Every 8 hrs only strictly as needed for severe pain >8-9/10. can take tylenol prn for pain,. Should not be taking every 8 hrs, as he is already on clonazepam., his orthopedics surgeon can prescribe pain medicine if recommended.    Georgiana Jennings RN  [facility]

## 2021-06-14 NOTE — TELEPHONE ENCOUNTER
Patient seeking higher quantity - see pinion-pinshart message            Last Written Prescription Date:  6-9-2021  Last Fill Quantity: 10,   # refills: 0  Last Office Visit: 6-9-2021 Ivana  Future Office visit:    Next 5 appointments (look out 90 days)    Jun 14, 2021  2:30 PM  Lab visit with [facility]  [facility] ([facility] ) [address] [address] [phone]           Routing refill request to provider for review/approval because:  Drug not on the [facility], [facility] or [facility] refill protocol or controlled substance    Africa Kruger RT (R)

## 2021-06-14 NOTE — TELEPHONE ENCOUNTER
See mychart encounter on 6/11/21. Will sign this encounter.     Per Dr Heath Rx response: TYLENOL# 3 Every 8 hrs only strictly as needed for severe pain >8-9/10. can take tylenol prn for pain,. Should not be taking every 8 hrs, as he is already on clonazepam., his orthopedics surgeon can prescribe pain medicine if recommended    Georgiana Jennings RN  [facility]

## 2021-06-14 NOTE — TELEPHONE ENCOUNTER
Pt takes tylenol 3 every eight hours. Is requesting refill until his surgery 06/28/2021. Please advice on approval.       Routing refill request to provider for review/approval because:  Drug not on the [facility] refill protocol     Pending Prescriptions:                       Disp   Refills    acetaminophen-codeine (TYLENOL #3) 300-30*10 tab*0            Sig: Take 1 tablet by mouth every 8 hours as needed           for severe pain    acetaminophen-codeine (TYLENOL #3) 300-30 MG tablet 10 tablet 0 6/9/2021 6/12/2021       Next 5 appointments (look out 90 days)    Jun 22, 2021  8:45 AM  Return Visit with Brent Portillo MD  [facility] [place] [facility] ([facility] ) [address] [place] [id number] [phone]

## 2021-06-14 NOTE — TELEPHONE ENCOUNTER
Spoke with patient and he states that his ortho will not order anything until after the procedure.  He states that the medication really helps his pain and is asking if he can get a refill to get through until the surgery?     Please advise    Evita Lloyd RN  [facility]ticoagulation [facility]

## 2021-06-14 NOTE — PROGRESS NOTES
ANTICOAGULATION MANAGEMENT     Patient Name:  Erich Craven  Date:  6/14/2021    ASSESSMENT /SUBJECTIVE:    Today's INR result of 2.8 is therapeutic. Goal INR of 2.0-3.0      Warfarin dose taken: Warfarin taken as instructed    Diet: No new diet changes affecting INR    Medication changes/ interactions: No new medications/supplements affecting INR    Previous INR: Therapeutic     S/S of bleeding or thromboembolism: No    New injury or illness: No    Upcoming surgery, procedure or cardioversion: Yes: total shoulder arthoplasty on 6/28    Additional findings: None      PLAN:    Warfarin Dosing Instructions: Continue your current warfarin dose 7.5mg Fri and 5mg AOD until 6/22. Start warfarin  hold on 6/23 x 5 days.  No bridging per PCP.      Instructed patient to follow up no later than: 3 weeks  patient will be inpatient after procedure and is hoping to go to rehab after.  patient advised that hospitalist will advise on next INR recheck date once discharged     Education provided: Monitoring for bleeding signs and symptoms, Monitoring for clotting signs and symptoms and warfarin hold instructions.     Telephone call with Erich whom verbalizes understanding and agrees to plan    Instructed to call the Anticoagulation Clinic for any changes, questions or concerns. (#[phone])        Evita Lloyd RN      OBJECTIVE:  Recent labs: (last 7 days)     06/14/21  1456   INR 2.80*         No question data found.  Anticoagulation Summary  As of 6/14/2021    INR goal:  2.0-3.0   TTR:  74.8 % (1 y)   INR used for dosing:  No new INR was available at the time of this encounter.   Warfarin maintenance plan:  7.5 mg (5 mg x 1.5) every Fri; 5 mg (5 mg x 1) all other days   Full warfarin instructions:  6/23: Hold; 6/24: Hold; 6/25: Hold; 6/26: Hold; 6/27: Hold; Otherwise 7.5 mg every Fri; 5 mg all other days   Weekly warfarin total:  37.5 mg   Plan last modified:  Evita Lloyd RN (2/22/2021)   Next INR check:   7/5/2021   Priority:  Maintenance   Target end date:  Indefinite    Indications    PE (pulmonary thromboembolism) (H) [I26.99]  Paroxysmal A-fib (H) [I48.0]  Deep vein thrombosis (DVT) of distal vein of lower extremity  unspecified chronicity  unspecified laterality (H) [I82.4Z9]             Anticoagulation Episode Summary     INR check location:      Preferred lab:      Send INR reminders to:  SERENITY BECERRA    Comments:        Anticoagulation Care Providers     Provider Role Specialty Phone number    Dontrell Gómez MD Referring Internal Medicine [phone]    Anne Heath MD Referring Internal Medicine [phone]

## 2021-06-15 ENCOUNTER — TELEPHONE (OUTPATIENT)
Dept: FAMILY MEDICINE | Facility: CLINIC | Age: 81
End: 2021-06-15

## 2021-06-15 LAB — DEPRECATED CALCIDIOL+CALCIFEROL SERPL-MC: 34 UG/L (ref 20–75)

## 2021-06-15 NOTE — TELEPHONE ENCOUNTER
Dr. Heath approved medication on 6/14/21 for 10 tablets and 0 refills.     Georgiana Jennings RN  [facility]

## 2021-06-15 NOTE — TELEPHONE ENCOUNTER
Refill given # 10 for tylenol#3. Use once daily prn., will Not approve every 8 hrs prn, patient on clonazepam, and risk of interaction. We did preop but we did not address shoulder pain, and patient did not request pain medication during preop. Patient will need to schedule telephone visit to discuss further pain management and other pain medications he can take.

## 2021-06-15 NOTE — TELEPHONE ENCOUNTER
Reason for Call: Provider Communication    Return Phone Number: [phone] (cell)    Who is calling:  Dr. Srinath Kenny    Facility provider is associated with:  [facility]     Reason for call:  Abnormal overnight test, would like to discuss with PCP.    Urgency for return call:  End of day    Okay to leave detailed message?:  Yes    Call taken on 6/15/2021 at 1:02 PM by Eva Mercedes

## 2021-06-15 NOTE — TELEPHONE ENCOUNTER
Dr. Heath approved medication on 6/14/21 for 10 tablets and 0 refills.     Georgiana Jennings RN  [facility]

## 2021-06-16 ENCOUNTER — VIRTUAL VISIT (OUTPATIENT)
Dept: NEPHROLOGY | Facility: CLINIC | Age: 81
End: 2021-06-16
Attending: INTERNAL MEDICINE
Payer: MEDICARE

## 2021-06-16 ENCOUNTER — PRE VISIT (OUTPATIENT)
Dept: NEPHROLOGY | Facility: CLINIC | Age: 81
End: 2021-06-16

## 2021-06-16 ENCOUNTER — VIRTUAL VISIT (OUTPATIENT)
Dept: PHARMACY | Facility: CLINIC | Age: 81
End: 2021-06-16
Payer: COMMERCIAL

## 2021-06-16 DIAGNOSIS — N17.9 ACUTE KIDNEY INJURY (H): Primary | ICD-10-CM

## 2021-06-16 DIAGNOSIS — I82.4Z9 DEEP VEIN THROMBOSIS (DVT) OF DISTAL VEIN OF LOWER EXTREMITY, UNSPECIFIED CHRONICITY, UNSPECIFIED LATERALITY (H): ICD-10-CM

## 2021-06-16 DIAGNOSIS — I50.33 ACUTE ON CHRONIC HEART FAILURE WITH PRESERVED EJECTION FRACTION (H): ICD-10-CM

## 2021-06-16 DIAGNOSIS — I26.99 PE (PULMONARY THROMBOEMBOLISM) (H): ICD-10-CM

## 2021-06-16 DIAGNOSIS — N18.31 CHRONIC KIDNEY DISEASE, STAGE 3A (H): ICD-10-CM

## 2021-06-16 DIAGNOSIS — F41.9 ANXIETY: ICD-10-CM

## 2021-06-16 DIAGNOSIS — R30.0 DYSURIA: ICD-10-CM

## 2021-06-16 DIAGNOSIS — I10 ESSENTIAL HYPERTENSION: ICD-10-CM

## 2021-06-16 DIAGNOSIS — M25.512 CHRONIC PAIN OF BOTH SHOULDERS: Primary | ICD-10-CM

## 2021-06-16 DIAGNOSIS — I25.10 CORONARY ARTERY DISEASE INVOLVING NATIVE HEART WITHOUT ANGINA PECTORIS, UNSPECIFIED VESSEL OR LESION TYPE: ICD-10-CM

## 2021-06-16 DIAGNOSIS — I48.0 PAROXYSMAL A-FIB (H): ICD-10-CM

## 2021-06-16 DIAGNOSIS — M25.511 CHRONIC PAIN OF BOTH SHOULDERS: Primary | ICD-10-CM

## 2021-06-16 DIAGNOSIS — G89.29 CHRONIC PAIN OF BOTH SHOULDERS: Primary | ICD-10-CM

## 2021-06-16 LAB
BACTERIA SPEC CULT: NO GROWTH
Lab: NORMAL
SPECIMEN SOURCE: NORMAL

## 2021-06-16 PROCEDURE — 99606 MTMS BY PHARM EST 15 MIN: CPT | Performed by: PHARMACIST

## 2021-06-16 PROCEDURE — 99205 OFFICE O/P NEW HI 60 MIN: CPT | Mod: 95 | Performed by: INTERNAL MEDICINE

## 2021-06-16 RX ORDER — LIDOCAINE 40 MG/G
CREAM TOPICAL PRN
COMMUNITY
End: 2022-02-10

## 2021-06-16 NOTE — PROGRESS NOTES
Assessment and Plan:    81-year-old male patient with COPD, prior DVT, PE, on anticoagulation, HFpEF with LVEF 65%, atrial fibrillation, CAD, s/p SUMMER in 2014.  Has history of obstructive sleep apnea, refuses to wear CPAP.  Has history of BPH, hypothyroidism, HLD, chronic lymphedema, morbid obesity, pacemaker implantation for sick sinus syndrome, GERD.Patient referred to nephrology clinic for management of hypertension and CKD    DONA on CKD Stage 3a  Abnormal UA with dysuria . Urine Cx - NGTD     Creatinine was 0.89-1.07 in 2020  Creatinine subsequently increased and has remained in the range of 1.14-1.40 in the last 1 year with 2 episodes of increasing creatinine.  Patient had a gradual rise of creatinine from September 2020, peaked in November up to 1.83 and then decreased subsequently.  Creatinine was 1.02 in April 2021.  Most recent creatinine 1.81 on 6/14/2021.  BUN 62.  UPCR: 0.38, on 6/14/2021  Electrolytes are all stable.  H/O Recurrent UTI : UA, 6/14/2021: Increased WBC> 100, RBCs: Unable to quantify.  Proteins 30.  Leukocyte esterase moderate, nitrites negative.Previous UA shows persistence of increased leukocytes.  History of BPH: Yes.  On Flomax 0.4 mg daily. Also referred to Urology for History of intermittent UTI symptoms, with a history of BPH  T2DM: Yes, A1c 5.8 not on insulin. Retinopathy - unknown  Hypertension: Yes  H/o renal stones : none   NSAIDS use - none    Etiology of DONA seems likely multifactorial -   Most predominantly likley prerenal injury  2/2  Diuretics . Additional insults from Obstructive features due to BPH, recurrent UTI and long standing HTN all could be playing a role     He has some pyuria but no growth in culture ? Concern for prostatitis causing this pyuria . He has chronic. Pyuria . Would benefit from seeing Urology  Earlier then scheduled in August 2021 .         Recommendations :  -- Since he is feeling benefitted by Spironolactone in regards to his symptoms of leg  swelling, we will continue his current regimen .   --  Repeat Renal panel , check Urine ureoplasma  Mycoplasma cultures   --  Low potassium diet   -   Will do Renal US   --  With  Multiple diuretics - interpreting renin, Aldosterone will be difficult  - hence will hold off .   --  Avoid NSAIDSs/nephrotoxic agents   --  Follow up in 1-2 weeks         Hypertension : Home BP :   Volume status : chronic LE edema   H/o A. Fib/HFpEF/H/o PE/DVT/CAD  History of HFpEF: EF 65%, RA pressure 5, per TTE in 2019  [facility] 11/5/19 RA 13mmHg, PA 27mmHg, PCWP 13mmHg and cardiac output 2.81. Unknown weight at time of RHC.   Currently on:  Coreg 12.5 mg twice daily  Diltiazem  mg daily  Imdur 30 daily  Lisinopril 40 mg daily  Lasix 40 mg twice daily  Metolazone 2.5 mg 2 times daily  Spironolactone : Discontinued in Feb 2021 due to hyperkalemia  But restarted every other day 3 weeks ago   On statin , warfarin  -- Continue spironolactone for now   -- repeat renal panel in 1 week  -- continue to monitor vitals and maintain log   -- Nephrology RN visit in 2 weeks       Electrolytes : No acute issues . Had hyperkalemia in Aug/2020 and in Feb 2021 - spironolactone discontinued     Acid base - No acute issues . CO2 28     Anemia   Hb 12.2     BMD   Ca 9.2 . Albumin 3.6   ( 6/2021)   Vit D  34 ( 6/2021)   Continue to monitor       COPD   On trelegy, prednisone     RIN - not on CPAP      Follow up :   -- Nephrology clinic in 1 week with Chelsea  With repeat prior labs and renal US     Assessment and plan was discussed with patient and he voiced his understanding and agreement.    Patient staffed with Dr Shila Bowman MD, FACP  Nephrology Fellow   [facility]   Pager [phone]            Consult:  Erich Craven was seen in consultation at the request of Dr Anne Heath MD.     Reason for Visit:  Patient referred to nephrology clinic for management of hypertension and CKD      HPI:    81-year-old male  patient with COPD, prior DVT, PE, on anticoagulation, HFpEF with LVEF 65%, atrial fibrillation, CAD, s/p SUMMER in 2014.  Has history of obstructive sleep apnea, refuses to wear CPAP.  Has history of BPH, hypothyroidism, HLD, chronic lymphedema, morbid obesity, pacemaker implantation for sick sinus syndrome, GERD.Patient referred to nephrology clinic for management of hypertension and CKD      Dysuria present . No hematuria  Has increased as he is on diuretics   Has back pain intermittently     Has chronic SOB, stable on current COPD medication regimen   No cough,wheezing, fever   Patient denies any chest pain , PND, orthopnea.    His chronic leg swelling has improved   He is back on spironolactone every other day, 3 weeks ago . Seems to have reduced the amount of swelling in his legs . But review of his weight shows no significant change   No dizziness, lightheadedness.    No focal weakness, altered sensation or confusion    Has some constipation after starting tylenol #3 .No abdominal pain nausea vomiting.  No black stools or rectal bleeding.    No unintentional weight loss.    No skin rash    Drinks 40 ounces  Of water daily      Home -130/ 70, HR 75   Home - 290 Lbs   Wt Readings from Last 3 Encounters:   06/09/21 133.4 kg (294 lb)   04/21/21 131.1 kg (289 lb)   03/26/21 132.9 kg (293 lb)       ROS:   A comprehensive review of systems was obtained and negative, except as noted in the HPI or PMH.    Active Medical Problems:  Patient Active Problem List   Diagnosis     Morbid obesity (H)     PE (pulmonary thromboembolism) (H)     HTN (hypertension)     Chronic diastolic heart failure (H)     Dyspnea     Fatigue     RIN (obstructive sleep apnea)     COPD (chronic obstructive pulmonary disease) (H)     Hypothyroidism     Paroxysmal A-fib (H)     HLD (hyperlipidemia)     Lymphedema     (HFpEF) heart failure with preserved ejection fraction (H)     Essential hypertension     Cardiac pacemaker in situ     DVT (deep  venous thrombosis) (H)     CAD (coronary artery disease)     Obesity, Class III, BMI 40-49.9 (morbid obesity) (H)     Muscle tension headache     Heart failure with preserved ejection fraction, unspecified HF chronicity (H)     Status post coronary angiogram     Anxiety     Deep vein thrombosis (DVT) of distal vein of lower extremity, unspecified chronicity, unspecified laterality (H)     Diabetes mellitus, type 2 (H)     Chronic kidney disease, stage 3     Primary osteoarthritis of right shoulder     PMH:   Medical record was reviewed and PMH was discussed with patient and noted below.  Past Medical History:   Diagnosis Date     (HFpEF) heart failure with preserved ejection fraction (H)      BPH with urinary obstruction      CAD (coronary artery disease)     PCI w/ SUMMER in 2014     Cardiac pacemaker in situ      Chronic diastolic heart failure (H)      COPD (chronic obstructive pulmonary disease) (H)      DVT (deep venous thrombosis) (H)      Dyspnea      Essential hypertension      Fatigue      HLD (hyperlipidemia)      Hypothyroidism      Lymphedema      Obesity, Class III, BMI 40-49.9 (morbid obesity) (H)      RIN (obstructive sleep apnea)     has refused CPAP     Paroxysmal A-fib (H)      PE (pulmonary thromboembolism) (H)      PSH:   Past Surgical History:   Procedure Laterality Date     cardiac stenting       CV RIGHT HEART CATH MEASUREMENTS RECORDED N/A 11/5/2019    Procedure: Right Heart Cath;  Surgeon: Roberto Hernandez MD;  Location:  HEART CARDIAC CATH LAB     ENT SURGERY      tonsillectomy     IMPLANT PACEMAKER       ORTHOPEDIC SURGERY Right 2012    knee replacement       Family Hx:   Family History   Problem Relation Age of Onset     Hypertension Father      Personal Hx:   Social History     Tobacco Use     Smoking status: Former Smoker     Packs/day: 0.50     Years: 25.00     Pack years: 12.50     Smokeless tobacco: Never Used     Tobacco comment: quit in 1989   Substance Use Topics     Alcohol use:  No     Frequency: Never     Comment: quit in 1989; recovering       Allergies:  Allergies   Allergen Reactions     No Known Allergies        Medications:  Current Outpatient Medications   Medication Sig     acetaminophen (TYLENOL) 500 MG tablet Take 1,000 mg by mouth 4 times daily      acetaminophen-codeine (TYLENOL #3) 300-30 MG tablet Take 1 tablet by mouth daily as needed for severe pain     Acetylcarnitine HCl (ACETYL L-CARNITINE) 500 MG CAPS Take 1 capsule by mouth 2 times daily     albuterol (PROAIR HFA/PROVENTIL HFA/VENTOLIN HFA) 108 (90 Base) MCG/ACT inhaler Inhale 2 puffs into the lungs every 4 hours as needed for shortness of breath / dyspnea or wheezing     aspirin (ASA) 81 MG chewable tablet Take 1 tablet by mouth daily     atorvastatin (LIPITOR) 40 MG tablet TAKE 1 TABLET BY MOUTH EVERY EVENING     carvedilol (COREG) 12.5 MG tablet Take 1 tablet (12.5 mg) by mouth 2 times daily     clonazePAM (KLONOPIN) 0.5 MG tablet TAKE 1 TABLET BY MOUTH TWICE  A DAY AS NEEDED, PLEASE KEEP INTAKE TO MINIMAL (Patient not taking: Reported on 6/16/2021)     co-enzyme Q-10 100 MG CAPS capsule Take 100 mg by mouth daily     diltiazem ER COATED BEADS (CARDIZEM CD/CARTIA XT) 300 MG 24 hr capsule TAKE 1 CAPSULE BY MOUTH EVERY DAY     Fluticasone-Umeclidin-Vilanterol (TRELEGY ELLIPTA) 200-62.5-25 MCG/INH oral inhaler Inhale 1 puff into the lungs daily     furosemide (LASIX) 20 MG tablet Take 2 tablets (40 mg) by mouth 2 times daily     isosorbide mononitrate (IMDUR) 30 MG 24 hr tablet Take 1 tablet (30 mg) by mouth daily     levothyroxine (SYNTHROID/LEVOTHROID) 175 MCG tablet Take 1 tablet (175 mcg) by mouth daily Additional 1/2 tablet on Sundays     lidocaine (LMX4) 4 % external cream Apply topically as needed for mild pain     lisinopril (ZESTRIL) 40 MG tablet Take 1 tablet (40 mg) by mouth daily     metolazone (ZAROXOLYN) 2.5 MG tablet Take 1 tablet (2.5 mg) by mouth 2 times daily     Multiple vitamin TABS Take 1 tablet by  mouth daily     order for DME Oxygen 2 Li/min  Continuous. At night bled into PAP device. In the daytime, NC with bubbler: requiring portability     sertraline (ZOLOFT) 25 MG tablet Take 2 tablets (50 mg) by mouth daily     spironolactone (ALDACTONE) 25 MG tablet Take 1 tablet (25 mg) by mouth every other day     tamsulosin (FLOMAX) 0.4 MG capsule Take 1 capsule (0.4 mg) by mouth daily     Taurine 500 MG CAPS Take 500 mg by mouth 2 times daily     warfarin ANTICOAGULANT (COUMADIN) 5 MG tablet Take 1.5 tablets (7.5 mg) on Fridays and take 1 tablet (5 mg) all other days or as directed by the INR clinic     No current facility-administered medications for this visit.       Vitals:  There were no vitals taken for this visit.    Exam:  On video patient appeared awake and alert, conversing normally, no conversational dyspnea and speaking in full sentences . Comprehension is normal    LABS:   CMP  Recent Labs   Lab Test 06/14/21  1455 04/21/21  0931 03/02/21  1405 02/19/21  1528    138 136 138   POTASSIUM 4.7 3.7 4.5 5.4*   CHLORIDE 105 99 104 106   CO2 28 39* 28 26   ANIONGAP 4 <1* 4 6   * 104* 93 82   BUN 62* 44* 57* 53*   CR 1.81* 1.02 1.42* 1.34*   GFRESTIMATED 34* 68 46* 49*   GFRESTBLACK 39* 79 53* 57*   LAWSON 9.2 9.2 9.6 9.5     Recent Labs   Lab Test 02/19/21  1528 08/11/20  1343 07/14/20  1532 01/22/20  1216 02/27/19   BILITOTAL 0.4 0.5  --  0.5  --    ALKPHOS 107 104  --  112  --    ALT 26 49 45 45  --    AST 14 31  --  25 25     CBC  Recent Labs   Lab Test 06/14/21  1455 03/24/21  1805 03/04/21  1932 02/19/21  1528   HGB 12.2* 11.9* 11.4* 12.0*   WBC 10.2 10.4 10.4 10.6   RBC 3.68* 3.65* 3.49* 3.78*   HCT 36.7* 37.3* 36.1* 38.8*    102* 103* 103*   MCH 33.2* 32.6 32.7 31.7   MCHC 33.2 31.9 31.6 30.9*   RDW 15.6* 15.8* 15.7* 15.3*    208 185 200     URINE STUDIES  Recent Labs   Lab Test 06/14/21  1516 04/21/21  0853 12/21/20  1219 12/14/20  1706 08/11/20  1344   COLOR Yellow Yellow Yellow  Yellow Yellow   APPEARANCE Cloudy Clear Clear Clear Clear   URINEGLC Negative Negative Negative Negative Negative   URINEBILI Negative Negative Negative Negative Negative   URINEKETONE Negative Negative Negative Negative Negative   SG 1.020 1.015 1.015 1.015 1.020   UBLD Small* Negative Negative Trace* Negative   URINEPH 5.0 7.0 5.0 6.0 6.5   PROTEIN 30* Negative Negative 30* Negative   UROBILINOGEN 0.2 0.2 0.2 0.2 0.2   NITRITE Negative Negative Negative Negative Negative   LEUKEST Moderate* Negative Small* Small* Small*   RBCU Unable to quantitate other elements due to packed WBC's. *  --  O - 2 2-5* 2-5*   WBCU >100*  --  10-25* 25-50* 25-50*     Recent Labs   Lab Test 06/14/21  1516   UTPG 0.38*     PTH  Recent Labs   Lab Test 06/14/21  1455   PTHI 107*     IRON STUDIES  Recent Labs   Lab Test 02/19/21  1528   SEGUNDO 97         Gracie Rayo MD

## 2021-06-16 NOTE — TELEPHONE ENCOUNTER
Provider Dr. Dr. Kenny.  Dr. David thinks patient is high risk for upcoming surgery due to underlying obstructive sleep apnea and hypoxemia despite CPAP therapy and history of moderate severe COPD.  And surgery should be postponed as patient is still having dipping in his oxygen saturation despite being on CPAP.  Per Dr. Kenny he will benefit from a BiPAP therapy.  As per Dr. Kenny he should do okay from COPD perspective postoperatively although remains high risk.

## 2021-06-16 NOTE — Clinical Note
I spoke with Jaun today - I agree with your assessment of holding warfarin for 5 days prior to surgery and not needing bridging.  He will discuss aspirin with cardiology later this week.  We reviewed all the other medications and which to hold and when.  He reports he didn't realize the most recent Tylenol #3 Rx was reduced from three times daily to once daily and has continued taking three times daily.  He's no longer taking clonazepam while he's taking Tylenol #3 and he wonders if he could continue three times daily administration until surgery.  I told him I'd talk with you about it.  His surgeon has indicated they will not treat pain prior to surgery.  I think it's reasonable given that he's not been taking the clonazepam, but will defer to your preference.  Kassi Glez, PharmD, [facility]  Medication Therapy Management Provider  Pager: [phone]

## 2021-06-16 NOTE — PROGRESS NOTES
Medication Therapy Management (MTM) Encounter    ASSESSMENT:                            Medication Adherence/Access: No issues identified    Shoulder Pain: Last Rx for Tylenol #3 was for once daily administration, but patient has continued taking three times daily (he reports he didn't realize the Rx had been changed from three times daily administration).  He is off clonazepam while he's taking Tylenol #3; I think it'd be reasonable to continue Tylenol #3 three times daily as needed leading up to surgery if Dr. Heath is in agreement.  Patient does have history of alcohol abuse, so want to be cautious.  He's indicated orthopedics will not treat pain before surgery.  Needs additional education regarding medication recommendations prior to surgery.     Anxiety/Depression: Will continue to monitor post surgery.    Hypertension/A. Fib/HFpEF/H/o PE/DVT/CAD: Plan in place.  Arthroplasty surgery is considered a high bleeding risk surgery and his history of DVT/PE post surgery and also dx of A. Fib is classified as low risk of thrombosis - so recommendation to hold warfarin 5 days before procedure is appropriate (as Dr. Heath already recommended), no bridging needed.  Will defer aspirin recommendations to cardiology.    PLAN:                            1.  Stop all supplements/vitamins 1 week prior to surgery.  2.  Hold warfarin starting on 6/23  3.  Please talk with cardiology about whether or not you can be off aspirin.  4.  Hold the following medications the AM of surgery: furosemide, lisinopril, metolazone and spironolactone.  You can take all other medications the AM of surgery.  5.  I will talk with Dr. Heath about Tylenol #3 - patient is off clonazepam so it may be reasonable to take Tylenol #3 three times daily as needed until surgery.    Follow-up: Return for after talking with Dr. Heath.    SUBJECTIVE/OBJECTIVE:                          Erich Craven is a 81 year old male contacted via secure video for a  follow-up visit. He was referred to me from Dr. Heath.  Today's visit is a follow-up MTM visit from 5/13/2021.     Reason for visit: Discussion of medications to stop prior to surgery.    Tobacco: He reports that he has quit smoking. He has a 12.50 pack-year smoking history. He has never used smokeless tobacco.  Alcohol: history of alcohol dependence - sober since 1989    Medication Adherence/Access: no issues reported    Shoulder Pain:  Jaun is scheduled for right shoulder replacement on 6/28.  He's currently taking Tylenol #3 three times daily as needed (taking three times daily, most recent Rx was only for once daily, original Rx was for three times daily), he's also taking acetaminophen 500mg three times daily between doses of Tylenol #3.  He finds Tylenol #3 lasts right around 8 hours and then he needs another dose to keep pain controlled.  He's found Tylenol #3 quite helpful.  He is using Lidocaine gel as needed.  He's aware of maximum acetaminophen dosing per day from all sources.    Anxiety/Depression:  Current regimens include clonazepam 0.5mg twice daily as needed (not currently using due to Tylenol #3 use) and sertraline 50mg daily.  He's now off venlafaxine.  He's been having a hard time assessing anxiety since pain has been challenging.    Hypertension/A. Fib/HFpEF/H/o PE/DVT/CAD:  Patient has history of coronary artery disease with stenting of the proximal left anterior descending artery and also stenting of the proximal to mid right coronary artery in 2014.  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    Current medications include aspirin 81mg daily, warfarin as directed, carvedilol 12.5mg twice daily, diltiazem ER 300mg daily, furosemide 40mg twice daily, Imdur 30mg daily, lisinopril 40mg daily, metolazone 2.5mg twice daily (now taking prior to furosemide) and spironolactone 25mg every other day (resumed by cardiology following our last visit).  Patient does not self-monitor blood pressure.   Patient reports no current medication side effects including signs and symptoms bruising/bleeding.    Last EF 2/27/19 - 66%    Jaun is scheduled for stress test and f/up with cardiology prior to surgery.    BP Readings from Last 3 Encounters:   06/09/21 116/68   04/21/21 122/74   03/26/21 120/65      Potassium   Date Value Ref Range Status   06/14/2021 4.7 3.4 - 5.3 mmol/L Final      INR   Date Value Ref Range Status   06/14/2021 2.80 (H) 0.86 - 1.14 Final     Comment:     This test is intended for monitoring Coumadin therapy.  Results are not   accurate in patients with prolonged INR due to factor deficiency.         Today's Vitals: There were no vitals taken for this visit.  ----------------    I spent 13 minutes with this patient today. A copy of the visit note was provided to the patient's primary care provider.    The patient was sent via [employer] a summary of these recommendations.     Kassi Glez, PharmD, BCACP  Medication Therapy Management Provider  Pager: [phone]     Telemedicine Visit Details  Type of service:  Video Conference via L2C  Start Time: 9:30 AM  End Time: 9:43 AM  Originating Location (patient location): Home  Distant Location (provider location):  [facility]      Medication Therapy Recommendations  Chronic pain of both shoulders    Current Medication: acetaminophen-codeine (TYLENOL #3) 300-30 MG tablet   Rationale: Medication product not available - Adherence - Adherence   Recommendation: Start Medication   Status: Contact Provider - Awaiting Response

## 2021-06-16 NOTE — PATIENT INSTRUCTIONS
Repeat blood test next week   Eat a low potassium diet   Renal ULTRASOUND STUDY - Will be scheduled

## 2021-06-16 NOTE — PATIENT INSTRUCTIONS
Recommendations from today's MTM visit:                                                    MTM (medication therapy management) is a service provided by a clinical pharmacist designed to help you get the most of out of your medicines.   Today we reviewed what your medicines are for, how to know if they are working, that your medicines are safe and how to make your medicine regimen as easy as possible.      1.  Stop all supplements/vitamins 1 week prior to surgery.    2.  Hold warfarin starting on 6/23    3.  Please talk with cardiology about whether or not you can be off aspirin.    4.  Hold the following medications the AM of surgery: furosemide, lisinopril, metolazone and spironolactone.  You can take all other medications the AM of surgery with sips of water.    5.  I will talk with Dr. Heath about Tylenol #3    Follow-up: Dr. Heath/his team or myself will connect with you regarding the Tylenol #3    It was great to speak with you today.  I value your experience and would be very thankful for your time with providing feedback on our clinic survey. You may receive a survey via email or text message in the next few days.     To schedule another MTM appointment, please call the clinic directly or you may call the MTM scheduling line at [phone] or toll-free at [phone].     My Clinical Pharmacist's contact information:                                                      Please feel free to contact me with any questions or concerns you have.      Kassi Glez PharmD, Psychiatric  Medication Therapy Management Provider  Pager: [phone]     Whit Jackson PharmD  Medication Therapy Management Resident  Pager: [phone]

## 2021-06-16 NOTE — LETTER
6/16/2021       RE: Ercih Craven  [address]  [facility] [address]     Dear Colleague,    Thank you for referring your patient, Erich Craven, to the [facility]PH[facility]OLOG[facility] [place] [facility]. Please see a copy of my visit note below.    Assessment and Plan:    81-year-old male patient with COPD, prior DVT, PE, on anticoagulation, HFpEF with LVEF 65%, atrial fibrillation, CAD, s/p SUMMER in 2014.  Has history of obstructive sleep apnea, refuses to wear CPAP.  Has history of BPH, hypothyroidism, HLD, chronic lymphedema, morbid obesity, pacemaker implantation for sick sinus syndrome, GERD.Patient referred to nephrology clinic for management of hypertension and CKD    DONA on CKD Stage 3a  Abnormal UA with dysuria . Urine Cx - NGTD     Creatinine was 0.89-1.07 in 2020  Creatinine subsequently increased and has remained in the range of 1.14-1.40 in the last 1 year with 2 episodes of increasing creatinine.  Patient had a gradual rise of creatinine from September 2020, peaked in November up to 1.83 and then decreased subsequently.  Creatinine was 1.02 in April 2021.  Most recent creatinine 1.81 on 6/14/2021.  BUN 62.  UPCR: 0.38, on 6/14/2021  Electrolytes are all stable.  H/O Recurrent UTI : UA, 6/14/2021: Increased WBC> 100, RBCs: Unable to quantify.  Proteins 30.  Leukocyte esterase moderate, nitrites negative.Previous UA shows persistence of increased leukocytes.  History of BPH: Yes.  On Flomax 0.4 mg daily. Also referred to Urology for History of intermittent UTI symptoms, with a history of BPH  T2DM: Yes, A1c 5.8 not on insulin. Retinopathy - unknown  Hypertension: Yes  H/o renal stones : none   NSAIDS use - none    Etiology of DONA seems likely multifactorial -   Most predominantly likley prerenal injury  2/2  Diuretics . Additional insults from Obstructive features due to BPH, recurrent UTI and long standing HTN all could be playing a  role     He has some pyuria but no growth in culture ? Concern for prostatitis causing this pyuria . He has chronic. Pyuria . Would benefit from seeing Urology  Earlier then scheduled in August 2021 .         Recommendations :  -- Since he is feeling benefitted by Spironolactone in regards to his symptoms of leg swelling, we will continue his current regimen .   --  Repeat Renal panel , check Urine ureoplasma  Mycoplasma cultures   --  Low potassium diet   -   Will do Renal US   --  With  Multiple diuretics - interpreting renin, Aldosterone will be difficult  - hence will hold off .   --  Avoid NSAIDSs/nephrotoxic agents   --  Follow up in 1-2 weeks         Hypertension : Home BP :   Volume status : chronic LE edema   H/o A. Fib/HFpEF/H/o PE/DVT/CAD  History of HFpEF: EF 65%, RA pressure 5, per TTE in 2019  [facility] 11/5/19 RA 13mmHg, PA 27mmHg, PCWP 13mmHg and cardiac output 2.81. Unknown weight at time of RHC.   Currently on:  Coreg 12.5 mg twice daily  Diltiazem  mg daily  Imdur 30 daily  Lisinopril 40 mg daily  Lasix 40 mg twice daily  Metolazone 2.5 mg 2 times daily  Spironolactone : Discontinued in Feb 2021 due to hyperkalemia  But restarted every other day 3 weeks ago   On statin , warfarin  -- Continue spironolactone for now   -- repeat renal panel in 1 week  -- continue to monitor vitals and maintain log   -- Nephrology RN visit in 2 weeks       Electrolytes : No acute issues . Had hyperkalemia in Aug/2020 and in Feb 2021 - spironolactone discontinued     Acid base - No acute issues . CO2 28     Anemia   Hb 12.2     BMD   Ca 9.2 . Albumin 3.6   ( 6/2021)   Vit D  34 ( 6/2021)   Continue to monitor       COPD   On trelegy, prednisone     RIN - not on CPAP      Follow up :   -- Nephrology clinic in 1 week with Chelsea  With repeat prior labs and renal US     Assessment and plan was discussed with patient and he voiced his understanding and agreement.    Patient staffed with Dr Shila Rayo  Gracie MCDOWELL, FACP  Nephrology Fellow   [facility]   Pager [phone]            Consult:  Erich Craven was seen in consultation at the request of Dr Anne Heath MD.     Reason for Visit:  Patient referred to nephrology clinic for management of hypertension and CKD      HPI:    81-year-old male patient with COPD, prior DVT, PE, on anticoagulation, HFpEF with LVEF 65%, atrial fibrillation, CAD, s/p SUMMER in 2014.  Has history of obstructive sleep apnea, refuses to wear CPAP.  Has history of BPH, hypothyroidism, HLD, chronic lymphedema, morbid obesity, pacemaker implantation for sick sinus syndrome, GERD.Patient referred to nephrology clinic for management of hypertension and CKD      Dysuria present . No hematuria  Has increased as he is on diuretics   Has back pain intermittently     Has chronic SOB, stable on current COPD medication regimen   No cough,wheezing, fever   Patient denies any chest pain , PND, orthopnea.    His chronic leg swelling has improved   He is back on spironolactone every other day, 3 weeks ago . Seems to have reduced the amount of swelling in his legs . But review of his weight shows no significant change   No dizziness, lightheadedness.    No focal weakness, altered sensation or confusion    Has some constipation after starting tylenol #3 .No abdominal pain nausea vomiting.  No black stools or rectal bleeding.    No unintentional weight loss.    No skin rash    Drinks 40 ounces  Of water daily      Home -130/ 70, HR 75   Home - 290 Lbs   Wt Readings from Last 3 Encounters:   06/09/21 133.4 kg (294 lb)   04/21/21 131.1 kg (289 lb)   03/26/21 132.9 kg (293 lb)       ROS:   A comprehensive review of systems was obtained and negative, except as noted in the HPI or PMH.    Active Medical Problems:  Patient Active Problem List   Diagnosis     Morbid obesity (H)     PE (pulmonary thromboembolism) (H)     HTN (hypertension)     Chronic diastolic heart failure (H)     Dyspnea      Fatigue     RIN (obstructive sleep apnea)     COPD (chronic obstructive pulmonary disease) (H)     Hypothyroidism     Paroxysmal A-fib (H)     HLD (hyperlipidemia)     Lymphedema     (HFpEF) heart failure with preserved ejection fraction (H)     Essential hypertension     Cardiac pacemaker in situ     DVT (deep venous thrombosis) (H)     CAD (coronary artery disease)     Obesity, Class III, BMI 40-49.9 (morbid obesity) (H)     Muscle tension headache     Heart failure with preserved ejection fraction, unspecified HF chronicity (H)     Status post coronary angiogram     Anxiety     Deep vein thrombosis (DVT) of distal vein of lower extremity, unspecified chronicity, unspecified laterality (H)     Diabetes mellitus, type 2 (H)     Chronic kidney disease, stage 3     Primary osteoarthritis of right shoulder     PMH:   Medical record was reviewed and PMH was discussed with patient and noted below.  Past Medical History:   Diagnosis Date     (HFpEF) heart failure with preserved ejection fraction (H)      BPH with urinary obstruction      CAD (coronary artery disease)     PCI w/ SUMMER in 2014     Cardiac pacemaker in situ      Chronic diastolic heart failure (H)      COPD (chronic obstructive pulmonary disease) (H)      DVT (deep venous thrombosis) (H)      Dyspnea      Essential hypertension      Fatigue      HLD (hyperlipidemia)      Hypothyroidism      Lymphedema      Obesity, Class III, BMI 40-49.9 (morbid obesity) (H)      RIN (obstructive sleep apnea)     has refused CPAP     Paroxysmal A-fib (H)      PE (pulmonary thromboembolism) (H)      PSH:   Past Surgical History:   Procedure Laterality Date     cardiac stenting       CV RIGHT HEART CATH MEASUREMENTS RECORDED N/A 11/5/2019    Procedure: Right Heart Cath;  Surgeon: Roberto Hernandez MD;  Location:  HEART CARDIAC CATH LAB     ENT SURGERY      tonsillectomy     IMPLANT PACEMAKER       ORTHOPEDIC SURGERY Right 2012    knee replacement       Family Hx:   Family  History   Problem Relation Age of Onset     Hypertension Father      Personal Hx:   Social History     Tobacco Use     Smoking status: Former Smoker     Packs/day: 0.50     Years: 25.00     Pack years: 12.50     Smokeless tobacco: Never Used     Tobacco comment: quit in 1989   Substance Use Topics     Alcohol use: No     Frequency: Never     Comment: quit in 1989; recovering       Allergies:  Allergies   Allergen Reactions     No Known Allergies        Medications:  Current Outpatient Medications   Medication Sig     acetaminophen (TYLENOL) 500 MG tablet Take 1,000 mg by mouth 4 times daily      acetaminophen-codeine (TYLENOL #3) 300-30 MG tablet Take 1 tablet by mouth daily as needed for severe pain     Acetylcarnitine HCl (ACETYL L-CARNITINE) 500 MG CAPS Take 1 capsule by mouth 2 times daily     albuterol (PROAIR HFA/PROVENTIL HFA/VENTOLIN HFA) 108 (90 Base) MCG/ACT inhaler Inhale 2 puffs into the lungs every 4 hours as needed for shortness of breath / dyspnea or wheezing     aspirin (ASA) 81 MG chewable tablet Take 1 tablet by mouth daily     atorvastatin (LIPITOR) 40 MG tablet TAKE 1 TABLET BY MOUTH EVERY EVENING     carvedilol (COREG) 12.5 MG tablet Take 1 tablet (12.5 mg) by mouth 2 times daily     clonazePAM (KLONOPIN) 0.5 MG tablet TAKE 1 TABLET BY MOUTH TWICE  A DAY AS NEEDED, PLEASE KEEP INTAKE TO MINIMAL (Patient not taking: Reported on 6/16/2021)     co-enzyme Q-10 100 MG CAPS capsule Take 100 mg by mouth daily     diltiazem ER COATED BEADS (CARDIZEM CD/CARTIA XT) 300 MG 24 hr capsule TAKE 1 CAPSULE BY MOUTH EVERY DAY     Fluticasone-Umeclidin-Vilanterol (TRELEGY ELLIPTA) 200-62.5-25 MCG/INH oral inhaler Inhale 1 puff into the lungs daily     furosemide (LASIX) 20 MG tablet Take 2 tablets (40 mg) by mouth 2 times daily     isosorbide mononitrate (IMDUR) 30 MG 24 hr tablet Take 1 tablet (30 mg) by mouth daily     levothyroxine (SYNTHROID/LEVOTHROID) 175 MCG tablet Take 1 tablet (175 mcg) by mouth daily  Additional 1/2 tablet on Sundays     lidocaine (LMX4) 4 % external cream Apply topically as needed for mild pain     lisinopril (ZESTRIL) 40 MG tablet Take 1 tablet (40 mg) by mouth daily     metolazone (ZAROXOLYN) 2.5 MG tablet Take 1 tablet (2.5 mg) by mouth 2 times daily     Multiple vitamin TABS Take 1 tablet by mouth daily     order for DME Oxygen 2 Li/min  Continuous. At night bled into PAP device. In the daytime, NC with bubbler: requiring portability     sertraline (ZOLOFT) 25 MG tablet Take 2 tablets (50 mg) by mouth daily     spironolactone (ALDACTONE) 25 MG tablet Take 1 tablet (25 mg) by mouth every other day     tamsulosin (FLOMAX) 0.4 MG capsule Take 1 capsule (0.4 mg) by mouth daily     Taurine 500 MG CAPS Take 500 mg by mouth 2 times daily     warfarin ANTICOAGULANT (COUMADIN) 5 MG tablet Take 1.5 tablets (7.5 mg) on Fridays and take 1 tablet (5 mg) all other days or as directed by the INR clinic     No current facility-administered medications for this visit.       Vitals:  There were no vitals taken for this visit.    Exam:  On video patient appeared awake and alert, conversing normally, no conversational dyspnea and speaking in full sentences . Comprehension is normal    LABS:   CMP  Recent Labs   Lab Test 06/14/21  1455 04/21/21  0931 03/02/21  1405 02/19/21  1528    138 136 138   POTASSIUM 4.7 3.7 4.5 5.4*   CHLORIDE 105 99 104 106   CO2 28 39* 28 26   ANIONGAP 4 <1* 4 6   * 104* 93 82   BUN 62* 44* 57* 53*   CR 1.81* 1.02 1.42* 1.34*   GFRESTIMATED 34* 68 46* 49*   GFRESTBLACK 39* 79 53* 57*   LAWSON 9.2 9.2 9.6 9.5     Recent Labs   Lab Test 02/19/21  1528 08/11/20  1343 07/14/20  1532 01/22/20  1216 02/27/19   BILITOTAL 0.4 0.5  --  0.5  --    ALKPHOS 107 104  --  112  --    ALT 26 49 45 45  --    AST 14 31  --  25 25     CBC  Recent Labs   Lab Test 06/14/21  1455 03/24/21  1805 03/04/21  1932 02/19/21  1528   HGB 12.2* 11.9* 11.4* 12.0*   WBC 10.2 10.4 10.4 10.6   RBC 3.68* 3.65*  3.49* 3.78*   HCT 36.7* 37.3* 36.1* 38.8*    102* 103* 103*   MCH 33.2* 32.6 32.7 31.7   MCHC 33.2 31.9 31.6 30.9*   RDW 15.6* 15.8* 15.7* 15.3*    208 185 200     URINE STUDIES  Recent Labs   Lab Test 06/14/21  1516 04/21/21  0853 12/21/20  1219 12/14/20  1706 08/11/20  1344   COLOR Yellow Yellow Yellow Yellow Yellow   APPEARANCE Cloudy Clear Clear Clear Clear   URINEGLC Negative Negative Negative Negative Negative   URINEBILI Negative Negative Negative Negative Negative   URINEKETONE Negative Negative Negative Negative Negative   SG 1.020 1.015 1.015 1.015 1.020   UBLD Small* Negative Negative Trace* Negative   URINEPH 5.0 7.0 5.0 6.0 6.5   PROTEIN 30* Negative Negative 30* Negative   UROBILINOGEN 0.2 0.2 0.2 0.2 0.2   NITRITE Negative Negative Negative Negative Negative   LEUKEST Moderate* Negative Small* Small* Small*   RBCU Unable to quantitate other elements due to packed WBC's. *  --  O - 2 2-5* 2-5*   WBCU >100*  --  10-25* 25-50* 25-50*     Recent Labs   Lab Test 06/14/21  1516   UTPG 0.38*     PTH  Recent Labs   Lab Test 06/14/21  1455   PTHI 107*     IRON STUDIES  Recent Labs   Lab Test 02/19/21  1528   SEGUNDO 97         Gracie Rayo MD    Attestation signed by Arlen Fuchs MD at 7/16/2021  6:33 PM:  Attestation:  This patient has been seen and evaluated by me, Arlen Fuhcs MD.  Discussed with the fellow or resident and agree with the findings and plan in this note.  I have reviewed Medications, Vital Signs, and Labs as well as provider notes.    Date of Service (when I saw the patient): 6/26/2021  I was on the video from [id number]    Arlen Fuchs MD  [facility]  Department of Medicine  Division of Renal Disease and Hypertension  [facility]  silvio Zamorano is a 81 year old who is being evaluated via a billable video visit.      How would you like to obtain your AVS? MyChart  If the video visit is dropped, the invitation should be resent by:  Text to cell phone: [phone]  Will anyone else be joining your video visit? No      Video Start Time: 3:13 PM  Video-Visit Details    Type of service:  Video Visit    Video End Time:3:50 PM    Originating Location (pt. Location): Home    Distant Location (provider location):  [facility] NEPHROLOGY [facility]     Platform used for Video Visit: [employer]        Again, thank you for allowing me to participate in the care of your patient.      Sincerely,    Gracie Rayo MD

## 2021-06-16 NOTE — PROGRESS NOTES
Jaun is a 81 year old who is being evaluated via a billable video visit.      How would you like to obtain your AVS? [employer]  If the video visit is dropped, the invitation should be resent by: Text to cell phone: [phone]  Will anyone else be joining your video visit? No      Video Start Time: 3:13 PM  Video-Visit Details    Type of service:  Video Visit    Video End Time:3:50 PM    Originating Location (pt. Location): Home    Distant Location (provider location):  [facility] NEPHROLOGY CLINIC [place]     Platform used for Video Visit: [employer]

## 2021-06-18 ENCOUNTER — HOSPITAL ENCOUNTER (OUTPATIENT)
Dept: CARDIOLOGY | Facility: CLINIC | Age: 81
Setting detail: NUCLEAR MEDICINE
End: 2021-06-18
Attending: INTERNAL MEDICINE
Payer: MEDICARE

## 2021-06-18 ENCOUNTER — HOSPITAL ENCOUNTER (OUTPATIENT)
Dept: CARDIOLOGY | Facility: CLINIC | Age: 81
End: 2021-06-18
Attending: INTERNAL MEDICINE
Payer: MEDICARE

## 2021-06-18 ENCOUNTER — HOSPITAL ENCOUNTER (OUTPATIENT)
Dept: ULTRASOUND IMAGING | Facility: CLINIC | Age: 81
End: 2021-06-18
Attending: INTERNAL MEDICINE
Payer: MEDICARE

## 2021-06-18 VITALS
HEIGHT: 72 IN | HEART RATE: 60 BPM | BODY MASS INDEX: 39.74 KG/M2 | OXYGEN SATURATION: 91 % | DIASTOLIC BLOOD PRESSURE: 62 MMHG | WEIGHT: 293.4 LBS | SYSTOLIC BLOOD PRESSURE: 110 MMHG

## 2021-06-18 DIAGNOSIS — N18.31 CHRONIC KIDNEY DISEASE, STAGE 3A (H): ICD-10-CM

## 2021-06-18 DIAGNOSIS — I25.10 CORONARY ARTERY DISEASE INVOLVING NATIVE CORONARY ARTERY OF NATIVE HEART WITHOUT ANGINA PECTORIS: ICD-10-CM

## 2021-06-18 DIAGNOSIS — N17.9 ACUTE KIDNEY INJURY (H): ICD-10-CM

## 2021-06-18 LAB
CV STRESS MAX HR HE: 81
NUC STRESS EJECTION FRACTION: 67 %
RATE PRESSURE PRODUCT: 8748
STRESS ECHO BASELINE DIASTOLIC HE: 62
STRESS ECHO BASELINE HR: 62
STRESS ECHO BASELINE SYSTOLIC BP: 110
STRESS ECHO CALCULATED PERCENT HR: 59 %
STRESS ECHO LAST STRESS DIASTOLIC BP: 58
STRESS ECHO LAST STRESS SYSTOLIC BP: 108
STRESS ECHO TARGET HR: 138

## 2021-06-18 PROCEDURE — 78452 HT MUSCLE IMAGE SPECT MULT: CPT | Mod: 26 | Performed by: INTERNAL MEDICINE

## 2021-06-18 PROCEDURE — 343N000001 HC RX 343: Performed by: INTERNAL MEDICINE

## 2021-06-18 PROCEDURE — 93018 CV STRESS TEST I&R ONLY: CPT | Mod: MG | Performed by: INTERNAL MEDICINE

## 2021-06-18 PROCEDURE — 93016 CV STRESS TEST SUPVJ ONLY: CPT | Performed by: INTERNAL MEDICINE

## 2021-06-18 PROCEDURE — 76770 US EXAM ABDO BACK WALL COMP: CPT

## 2021-06-18 PROCEDURE — G1004 CDSM NDSC: HCPCS

## 2021-06-18 PROCEDURE — 250N000011 HC RX IP 250 OP 636: Performed by: INTERNAL MEDICINE

## 2021-06-18 PROCEDURE — G1004 CDSM NDSC: HCPCS | Performed by: INTERNAL MEDICINE

## 2021-06-18 PROCEDURE — A9502 TC99M TETROFOSMIN: HCPCS | Performed by: INTERNAL MEDICINE

## 2021-06-18 RX ORDER — REGADENOSON 0.08 MG/ML
0.4 INJECTION, SOLUTION INTRAVENOUS ONCE
Status: COMPLETED | OUTPATIENT
Start: 2021-06-18 | End: 2021-06-18

## 2021-06-18 RX ORDER — AMINOPHYLLINE 25 MG/ML
50-100 INJECTION, SOLUTION INTRAVENOUS
Status: DISCONTINUED | OUTPATIENT
Start: 2021-06-18 | End: 2021-06-19 | Stop reason: HOSPADM

## 2021-06-18 RX ORDER — ALBUTEROL SULFATE 90 UG/1
2 AEROSOL, METERED RESPIRATORY (INHALATION) EVERY 5 MIN PRN
Status: DISCONTINUED | OUTPATIENT
Start: 2021-06-18 | End: 2021-06-19 | Stop reason: HOSPADM

## 2021-06-18 RX ORDER — ACYCLOVIR 200 MG/1
0-1 CAPSULE ORAL
Status: DISCONTINUED | OUTPATIENT
Start: 2021-06-18 | End: 2021-06-19 | Stop reason: HOSPADM

## 2021-06-18 RX ORDER — CAFFEINE CITRATE 20 MG/ML
60 SOLUTION INTRAVENOUS
Status: DISCONTINUED | OUTPATIENT
Start: 2021-06-18 | End: 2021-06-19 | Stop reason: HOSPADM

## 2021-06-18 RX ADMIN — TETROFOSMIN 9.54 MCI.: 1.38 INJECTION, POWDER, LYOPHILIZED, FOR SOLUTION INTRAVENOUS at 10:00

## 2021-06-18 RX ADMIN — REGADENOSON 0.4 MG: 0.08 INJECTION, SOLUTION INTRAVENOUS at 11:41

## 2021-06-18 RX ADMIN — TETROFOSMIN 29.9 MCI.: 1.38 INJECTION, POWDER, LYOPHILIZED, FOR SOLUTION INTRAVENOUS at 11:46

## 2021-06-18 ASSESSMENT — MIFFLIN-ST. JEOR: SCORE: 2068.85

## 2021-06-21 ENCOUNTER — OFFICE VISIT (OUTPATIENT)
Dept: FAMILY MEDICINE | Facility: CLINIC | Age: 81
End: 2021-06-21
Payer: MEDICARE

## 2021-06-21 VITALS
HEIGHT: 72 IN | BODY MASS INDEX: 39.96 KG/M2 | OXYGEN SATURATION: 94 % | WEIGHT: 295 LBS | DIASTOLIC BLOOD PRESSURE: 62 MMHG | HEART RATE: 60 BPM | TEMPERATURE: 96.6 F | SYSTOLIC BLOOD PRESSURE: 113 MMHG

## 2021-06-21 DIAGNOSIS — I10 ESSENTIAL HYPERTENSION: ICD-10-CM

## 2021-06-21 DIAGNOSIS — G89.29 CHRONIC RIGHT SHOULDER PAIN: ICD-10-CM

## 2021-06-21 DIAGNOSIS — R79.89 ELEVATED VITAMIN B12 LEVEL: ICD-10-CM

## 2021-06-21 DIAGNOSIS — M25.511 CHRONIC RIGHT SHOULDER PAIN: ICD-10-CM

## 2021-06-21 DIAGNOSIS — E11.9 TYPE 2 DIABETES MELLITUS WITHOUT COMPLICATION, WITHOUT LONG-TERM CURRENT USE OF INSULIN (H): Primary | ICD-10-CM

## 2021-06-21 LAB
HBA1C MFR BLD: 6.1 % (ref 0–5.6)
VIT B12 SERPL-MCNC: 2460 PG/ML (ref 193–986)

## 2021-06-21 PROCEDURE — 99215 OFFICE O/P EST HI 40 MIN: CPT | Performed by: INTERNAL MEDICINE

## 2021-06-21 PROCEDURE — 80048 BASIC METABOLIC PNL TOTAL CA: CPT | Performed by: INTERNAL MEDICINE

## 2021-06-21 PROCEDURE — 82607 VITAMIN B-12: CPT | Performed by: INTERNAL MEDICINE

## 2021-06-21 PROCEDURE — 83036 HEMOGLOBIN GLYCOSYLATED A1C: CPT | Performed by: INTERNAL MEDICINE

## 2021-06-21 PROCEDURE — 36415 COLL VENOUS BLD VENIPUNCTURE: CPT | Performed by: INTERNAL MEDICINE

## 2021-06-21 ASSESSMENT — MIFFLIN-ST. JEOR: SCORE: 2076.11

## 2021-06-21 NOTE — PROGRESS NOTES
[address], [address]  [facility] [address]  Phone: [phone]  Primary Provider: Anne Heath        PREOPERATIVE EVALUATION:  Today's date: 6/21/2021    Erich Craven is a 81 year old male who presents for a preoperative evaluation.    Surgical Information:  Surgery/Procedure: RIGHT REVERSE TOTAL SHOULDER ARTHROPLASTY  Surgery Location: [facility]  Surgeon: Wayne Kumar MD  Surgery Date: 06/28/2021  Time of Surgery: 9:50 AM  Where patient plans to recover: At home alone  Fax number for surgical facility: Note does not need to be faxed, will be available electronically in Epic.    Type of Anesthesia Anticipated: Combined General with Interscalene Block    Assessment & Plan     The proposed surgical procedure is considered INTERMEDIATE risk.    Problem List Items Addressed This Visit        Endocrine    Diabetes mellitus, type 2 (H) - Primary    Relevant Orders    HEMOGLOBIN A1C (Completed)    Basic metabolic panel  (Ca, Cl, CO2, Creat, Gluc, K, Na, BUN) (Completed)       Circulatory    HTN (hypertension)    Relevant Orders    REVIEW OF HEALTH MAINTENANCE PROTOCOL ORDERS (Completed)    Basic metabolic panel  (Ca, Cl, CO2, Creat, Gluc, K, Na, BUN) (Completed)      Other Visit Diagnoses     Chronic right shoulder pain        Relevant Medications    diclofenac (VOLTAREN) 1 % topical gel    Other Relevant Orders    PAIN MANAGEMENT REFERRAL    Elevated vitamin B12 level        Relevant Orders    Vitamin B12 (Completed)        Discussed with Jaun that he is not yet cleared for surgery by pulmonary.  He will need adjustment to his apnea machine and changed to a BiPAP machine as per recommendation of pulmonary Dr Kenny as he is still having hypoxemia while on the CPAP.    Patient did sustain a fall recently describes the arm of a chair broke and he fell on his right side, hurt in the right posterior buttocks but he is doing better now  he is using a cane to ambulate.    He is following with orthopedic for chronic right shoulder pain, advised to see pain clinic for pain management.  We did prescribe diclofenac gel to use twice a day as needed for pain control, concerns of nonsteroidals and chronic kidney disease, will ask if would be cleared by nephrology.  Advised to space out the dosage of Tylenol 3 because of his chronic kidney disease he has been taking every 8 hours , advised to take once every 18 hours or once a day most.  Advised do not drive or use fine machinery or drink alcohol on this medication.  Patient does not drink alcohol.  He has weaned down his clonazepam to 1 tablet a day although he stated prior that he was not taking any clonazepam.  Concerning is a combination of opioids Tylenol 3 with benzodiazepine.  Risk of falls patient denies any dizziness lightheadedness or somnolence with use of these medications, although he did sustain a fall already.  He reports that T#3 is helping with his chronic shoulder pain.  He used to get steroid injections in his right shoulder.  He will discuss with orthopedics.    We will check some basic labs again, his B12 was elevated will recheck.  We will check his electrolytes  and advised him his potassium has been elevated in the past, spironolactone and lisinopril can both increase potassium; stay on low potassium diet.  Is on sertraline 50 mg.  We will need to check QT corrected on EKG.  Will need repeat EKG patient is seeing cardiology tomorrow.        Risks and Recommendations:  The patient has the following additional risks and recommendations for perioperative complications:   - Consult Hospitalist / IM to assist with post-op medical management    Medication Instructions:  Patient is on no chronic medications  See MTM recommendations    RECOMMENDATION:  Surgery is not recommended yet As sleep apnea is still not yet controlled on CPAP.  Pulmonary sleep specialist working with the patient to  get a BiPAP machine, also having severe COPD is risk for surgery.  We will refer patient to pain management to help continue management of his bilateral shoulder pain; right more than left.    Follow-up with cardiology and nephrology.          Subjective     HPI related to upcoming procedure: Right shoulder arthroplasty  Patient presented for follow-up he sustained a fall right arm fracture gave out and he fell on his right side.  He sustained a bruise or trauma to the right posterior hip.  He has been using ~#3 for 8 hours also was recommended to space out to once a day.  Patient initially stated that he is not taking on his back then he reports he has been weaning down to 1 tablet a day.  Low was advised not to mix with opioids and benzodiazepine.  Pulmonary specialist working with patient and arranging for BiPAP machine.  He was having hypoxemia dips while using CPAP.  Patient does walk a block every day he walks a small puppy.  He does have some dyspnea on exertion.  He has advanced COPD; On Trelegy inhaler as well as as needed albuterol.  Pulse ox on room air was 94%.  Blood pressure seems well controlled.  Patient denies any dizziness lightheadedness orthostatic dizziness.  Denies any chest pain palpitations presyncope or syncope.  He reports that the fall he sustained was not due to dizziness or imbalance but the arm of the right sure broke and he fell on the right side.  Denies any other body site injuries other than the right posterior buttocks area.  Is following with a kidney specialist in April 2021 his creatinine went back to normal range GFR above 60 and then dipped again with creatinine increased to 1.8 indicative of.his probable adequate renal reserve.  Can he continues to follow-up with nephrology.  He was advised on low potassium diet and spironolactone was added in addition to lisinopril and he remains on Lasix as well.  No GI symptoms.  He follows with the Coumadin clinic and his INRs have been  within therapeutic range.    Preop Questions 6/18/2021   1. Have you ever had a heart attack or stroke? No   2. Have you ever had surgery on your heart or blood vessels, such as a stent placement, a coronary artery bypass, or surgery on an artery in your head, neck, heart, or legs? YES    3. Do you have chest pain with activity? No   4. Do you have a history of  heart failure? No   5. Do you currently have a cold, bronchitis or symptoms of other infection? No   6. Do you have a cough, shortness of breath, or wheezing? No   7. Do you or anyone in your family have previous history of blood clots? YES    8. Do you or does anyone in your family have a serious bleeding problem such as prolonged bleeding following surgeries or cuts? No   9. Have you ever had problems with anemia or been told to take iron pills? No   10. Have you had any abnormal blood loss such as black, tarry or bloody stools? No   11. Have you ever had a blood transfusion? No   12. Are you willing to have a blood transfusion if it is medically needed before, during, or after your surgery? Yes   13. Have you or any of your relatives ever had problems with anesthesia? YES    14. Do you have sleep apnea, excessive snoring or daytime drowsiness? YES -working sleep specialist   14a. Do you have a CPAP machine? Yes   15. Do you have any artifical heart valves or other implanted medical devices like a pacemaker, defibrillator, or continuous glucose monitor? YES    15a. What type of device do you have? pacemaker   15b. Name of the clinic that manages your device:  [facility]   16. Do you have artificial joints? YES -   17. Are you allergic to latex? No       Health Care Directive:  Patient does not have a Health Care Directive or Living Will: Discussed advance care planning with patient; however, patient declined at this time.    Preoperative Review of :   reviewed - no record of controlled substances prescribed.      Status of Chronic  Conditions:  COPD - Patient has a longstanding history of moderate-severe COPD . Patient has been doing well overall noting SOB, MARTÍNEZ, WHEEZING and DYSPNEA and continues on medication regimen consisting of Trelegy Ellipta and albuterol as needed without adverse reactions or side effects.    DEPRESSION - Patient has a long history of Depression of moderate severity requiring medication for control with recent symptoms being stable..Current symptoms of depression include none.     DIABETES - Patient has a longstanding history of DiabetesType Type II . Patient is being treated with diet and denies significant side effects. Control has been good. Complicating factors include but are not limited to: hypertension, hyperlipidemia, chronic kidney disease and morbid obesity .     HYPERLIPIDEMIA - Patient has a long history of significant Hyperlipidemia requiring medication for treatment with recent good control. Patient reports no problems or side effects with the medication.     HYPERTENSION - Patient has longstanding history of HTN , currently denies any symptoms referable to elevated blood pressure. Specifically denies chest pain, palpitations, dyspnea, orthopnea, PND or peripheral edema. Blood pressure readings have been in normal range. Current medication regimen is as listed below. Patient denies any side effects of medication.     RENAL INSUFFICIENCY - Patient has a longstanding history of moderate-severe chronic renal insufficiency. Last Cr 1.8.     SLEEP PROBLEM - Patient has a longstanding history of snoring.. Patient has tried OTC medications with limited success.       Review of Systems  Constitutional, neuro, ENT, endocrine, pulmonary, cardiac, gastrointestinal, genitourinary, musculoskeletal, integument and psychiatric systems are negative, except as otherwise noted.    Patient Active Problem List    Diagnosis Date Noted     Primary osteoarthritis of right shoulder 03/05/2021     Priority: Medium     Added  automatically from request for surgery [id number]       Chronic kidney disease, stage 3 12/14/2020     Priority: Medium     Diabetes mellitus, type 2 (H) 08/12/2020     Priority: Medium     Deep vein thrombosis (DVT) of distal vein of lower extremity, unspecified chronicity, unspecified laterality (H) 05/08/2020     Priority: Medium     Anxiety 03/02/2020     Priority: Medium     Patient is followed by [facility] for ongoing prescription of benzodiazepines.  All refills should be approved by this provider, or covering partner.    Medication(s): Pending Prescriptions:                       Disp   Refills    clonazePAM (KLONOPIN) 0.5 MG tablet [Phar*60 tab*0            Sig: TAKE 1 TABLET BY MOUTH TWICE A DAY AS NEEDED    .   Maximum quantity per month: 60  Clinic visit frequency required: Q 6  months     Controlled substance agreement on file: No  Benzodiazepine use reviewed by psychiatry:      Last [facility] website verification:  05/06/20   [email or web address]           Status post coronary angiogram 11/05/2019     Priority: Medium     Heart failure with preserved ejection fraction, unspecified HF chronicity (H) 10/10/2019     Priority: Medium     Added automatically from request for surgery [id number]       Muscle tension headache 05/21/2019     Priority: Medium     PE (pulmonary thromboembolism) (H)      Priority: Medium     HTN (hypertension)      Priority: Medium     Chronic diastolic heart failure (H)      Priority: Medium     Dyspnea      Priority: Medium     Fatigue      Priority: Medium     RIN (obstructive sleep apnea)      Priority: Medium     COPD (chronic obstructive pulmonary disease) (H)      Priority: Medium     Hypothyroidism      Priority: Medium     Paroxysmal A-fib (H)      Priority: Medium     HLD (hyperlipidemia)      Priority: Medium     Lymphedema      Priority: Medium     (HFpEF) heart failure with preserved ejection fraction (H)      Priority: Medium     Essential  hypertension      Priority: Medium     Cardiac pacemaker in situ      Priority: Medium     DVT (deep venous thrombosis) (H)      Priority: Medium     CAD (coronary artery disease)      Priority: Medium     PCI w/ SUMMER in 2014       Obesity, Class III, BMI 40-49.9 (morbid obesity) (H)      Priority: Medium     Morbid obesity (H) 04/14/2019     Priority: Medium      Past Medical History:   Diagnosis Date     (HFpEF) heart failure with preserved ejection fraction (H)      BPH with urinary obstruction      CAD (coronary artery disease)     PCI w/ SUMMER in 2014     Cardiac pacemaker in situ      Chronic diastolic heart failure (H)      COPD (chronic obstructive pulmonary disease) (H)      DVT (deep venous thrombosis) (H)      Dyspnea      Essential hypertension      Fatigue      HLD (hyperlipidemia)      Hypothyroidism      Lymphedema      Obesity, Class III, BMI 40-49.9 (morbid obesity) (H)      RIN (obstructive sleep apnea)     has refused CPAP     Paroxysmal A-fib (H)      PE (pulmonary thromboembolism) (H)      Past Surgical History:   Procedure Laterality Date     cardiac stenting       CV RIGHT HEART CATH MEASUREMENTS RECORDED N/A 11/5/2019    Procedure: Right Heart Cath;  Surgeon: Roberto Hernandez MD;  Location:  HEART CARDIAC CATH LAB     ENT SURGERY      tonsillectomy     IMPLANT PACEMAKER       ORTHOPEDIC SURGERY Right 2012    knee replacement     Current Outpatient Medications   Medication Sig Dispense Refill     acetaminophen (TYLENOL) 500 MG tablet Take 1,000 mg by mouth 4 times daily        acetaminophen-codeine (TYLENOL #3) 300-30 MG tablet Take 1 tablet by mouth every 8 hours as needed for severe pain 30 tablet 0     Acetylcarnitine HCl (ACETYL L-CARNITINE) 500 MG CAPS Take 1 capsule by mouth 2 times daily       albuterol (PROAIR HFA/PROVENTIL HFA/VENTOLIN HFA) 108 (90 Base) MCG/ACT inhaler Inhale 2 puffs into the lungs every 4 hours as needed for shortness of breath / dyspnea or wheezing 1 Inhaler 0      aspirin (ASA) 81 MG chewable tablet Take 1 tablet by mouth daily       atorvastatin (LIPITOR) 40 MG tablet TAKE 1 TABLET BY MOUTH EVERY EVENING 90 tablet 2     carvedilol (COREG) 12.5 MG tablet Take 1 tablet (12.5 mg) by mouth 2 times daily 180 tablet 3     clonazePAM (KLONOPIN) 0.5 MG tablet TAKE 1 TABLET BY MOUTH TWICE  A DAY AS NEEDED, PLEASE KEEP INTAKE TO MINIMAL 30 tablet 0     co-enzyme Q-10 100 MG CAPS capsule Take 100 mg by mouth daily       diclofenac (VOLTAREN) 1 % topical gel Apply 1 g topically 2 times daily as needed for moderate pain 50 g 0     diltiazem ER COATED BEADS (CARDIZEM CD/CARTIA XT) 300 MG 24 hr capsule TAKE 1 CAPSULE BY MOUTH EVERY DAY 90 capsule 3     Fluticasone-Umeclidin-Vilanterol (TRELEGY ELLIPTA) 200-62.5-25 MCG/INH oral inhaler Inhale 1 puff into the lungs daily       furosemide (LASIX) 20 MG tablet Take 2 tablets (40 mg) by mouth 2 times daily 360 tablet 3     isosorbide mononitrate (IMDUR) 30 MG 24 hr tablet Take 1 tablet (30 mg) by mouth daily 90 tablet 3     levothyroxine (SYNTHROID/LEVOTHROID) 175 MCG tablet Take 1 tablet (175 mcg) by mouth daily Additional 1/2 tablet on Sundays 100 tablet 0     lidocaine (LMX4) 4 % external cream Apply topically as needed for mild pain       lisinopril (ZESTRIL) 40 MG tablet Take 1 tablet (40 mg) by mouth daily 90 tablet 3     metolazone (ZAROXOLYN) 2.5 MG tablet Take 1 tablet (2.5 mg) by mouth 2 times daily 180 tablet 3     Multiple vitamin TABS Take 1 tablet by mouth daily       order for DME Oxygen 2 Li/min  Continuous. At night bled into PAP device. In the daytime, NC with bubbler: requiring portability 1 Device 0     sertraline (ZOLOFT) 25 MG tablet Take 2 tablets (50 mg) by mouth daily 180 tablet 1     spironolactone (ALDACTONE) 25 MG tablet Take 1 tablet (25 mg) by mouth every other day 30 tablet 1     tamsulosin (FLOMAX) 0.4 MG capsule Take 1 capsule (0.4 mg) by mouth daily 90 capsule 0     Taurine 500 MG CAPS Take 500 mg by mouth 2  times daily       warfarin ANTICOAGULANT (COUMADIN) 5 MG tablet Take 1.5 tablets (7.5 mg) on Fridays and take 1 tablet (5 mg) all other days or as directed by the INR clinic 100 tablet 0       Allergies   Allergen Reactions     No Known Allergies         Social History     Tobacco Use     Smoking status: Former Smoker     Packs/day: 0.50     Years: 25.00     Pack years: 12.50     Smokeless tobacco: Never Used     Tobacco comment: quit in 1989   Substance Use Topics     Alcohol use: No     Frequency: Never     Comment: quit in 1989; recovering     Family History   Problem Relation Age of Onset     Hypertension Father      History   Drug Use No         Objective     /62 (BP Location: Left arm, Patient Position: Chair, Cuff Size: Adult Large)   Pulse 60   Temp 96.6  F (35.9  C) (Temporal)   Ht 1.829 m (6')   Wt 133.8 kg (295 lb)   SpO2 94%   BMI 40.01 kg/m      Physical Exam  General appearance not in acute distress pleasant gentleman of stated age, morbidly obese  Head and neck, cranial nerves II to XII  Eyes EOMI PRL EOMI  Neck no masses  Lungs fair air entry bilateral; positive end-expiratory wheeze bilateral, no use of accessory muscles.  No crackles or rales or rhonchi.  Heart regular rate and rhythm, distant heart sounds, no murmur  Abdomen soft, obese, multiple small bruise ecchymosis  Right hip/buttocks area; no ecchymosis or bruise, full range of motion of the right hip.  Pitting edema +2, chronic dermatitis changes scaly pigmented skin on distal lower extremity with pitting edema.  Neurologic grossly intact, intact motor power in 4 extremities, uses a cane for ambulation  Psychology: Normal affect      Recent Labs   Lab Test 06/14/21  1456 06/14/21  1455 04/21/21  0931 04/19/21  1029 03/24/21  1805 03/24/21  1805 02/19/21  1528 02/19/21  1528 08/11/20  1343 08/11/20  1343   HGB  --  12.2*  --   --   --  11.9*   < > 12.0*  --  13.6   PLT  --  195  --   --   --  208   < > 200  --  247   INR 2.80*   --   --  2.80*   < >  --    < >  --    < >  --    NA  --  137 138  --   --   --    < > 138   < > 134   POTASSIUM  --  4.7 3.7  --   --   --    < > 5.4*   < > 6.0*   CR  --  1.81* 1.02  --   --   --    < > 1.34*   < > 1.36*   A1C  --   --   --   --   --   --   --  5.8*  --  6.7*    < > = values in this interval not displayed.        Diagnostics:  Labs pending at this time.  Results will be reviewed when available.       Revised Cardiac Risk Index (RCRI):  The patient has the following serious cardiovascular risks for perioperative complications:   - No serious cardiac risks = 0 points     RCRI Interpretation: 0 points: Class I (very low risk - 0.4% complication rate)    Pulmonary Risk is mainly related to RIN and COPD,        Signed Electronically by: Anne Heath MD  Copy of this evaluation report is provided to requesting physician.

## 2021-06-22 ENCOUNTER — OFFICE VISIT (OUTPATIENT)
Dept: CARDIOLOGY | Facility: CLINIC | Age: 81
End: 2021-06-22
Payer: MEDICARE

## 2021-06-22 VITALS
HEART RATE: 60 BPM | WEIGHT: 294 LBS | SYSTOLIC BLOOD PRESSURE: 121 MMHG | OXYGEN SATURATION: 94 % | BODY MASS INDEX: 39.82 KG/M2 | DIASTOLIC BLOOD PRESSURE: 77 MMHG | HEIGHT: 72 IN

## 2021-06-22 DIAGNOSIS — I50.32 CHRONIC DIASTOLIC HEART FAILURE (H): ICD-10-CM

## 2021-06-22 DIAGNOSIS — I50.30 HEART FAILURE WITH PRESERVED EJECTION FRACTION, UNSPECIFIED HF CHRONICITY (H): ICD-10-CM

## 2021-06-22 DIAGNOSIS — E66.01 MORBID OBESITY (H): ICD-10-CM

## 2021-06-22 DIAGNOSIS — I48.0 PAROXYSMAL A-FIB (H): Primary | ICD-10-CM

## 2021-06-22 DIAGNOSIS — I49.5 SICK SINUS SYNDROME (H): ICD-10-CM

## 2021-06-22 DIAGNOSIS — G47.33 OSA (OBSTRUCTIVE SLEEP APNEA): ICD-10-CM

## 2021-06-22 DIAGNOSIS — Z95.0 CARDIAC PACEMAKER IN SITU: ICD-10-CM

## 2021-06-22 DIAGNOSIS — I25.10 CORONARY ARTERY DISEASE INVOLVING NATIVE CORONARY ARTERY OF NATIVE HEART WITHOUT ANGINA PECTORIS: ICD-10-CM

## 2021-06-22 DIAGNOSIS — I10 ESSENTIAL HYPERTENSION: ICD-10-CM

## 2021-06-22 DIAGNOSIS — R60.0 BILATERAL LOWER EXTREMITY EDEMA: ICD-10-CM

## 2021-06-22 DIAGNOSIS — E78.49 OTHER HYPERLIPIDEMIA: ICD-10-CM

## 2021-06-22 DIAGNOSIS — I48.0 PAROXYSMAL ATRIAL FIBRILLATION (H): ICD-10-CM

## 2021-06-22 LAB
ANION GAP SERPL CALCULATED.3IONS-SCNC: 6 MMOL/L (ref 3–14)
BUN SERPL-MCNC: 64 MG/DL (ref 7–30)
CALCIUM SERPL-MCNC: 9.4 MG/DL (ref 8.5–10.1)
CHLORIDE SERPL-SCNC: 103 MMOL/L (ref 94–109)
CO2 SERPL-SCNC: 29 MMOL/L (ref 20–32)
CREAT SERPL-MCNC: 1.57 MG/DL (ref 0.66–1.25)
GFR SERPL CREATININE-BSD FRML MDRD: 40 ML/MIN/{1.73_M2}
GLUCOSE SERPL-MCNC: 100 MG/DL (ref 70–99)
POTASSIUM SERPL-SCNC: 4.1 MMOL/L (ref 3.4–5.3)
SODIUM SERPL-SCNC: 138 MMOL/L (ref 133–144)

## 2021-06-22 PROCEDURE — 99214 OFFICE O/P EST MOD 30 MIN: CPT | Performed by: INTERNAL MEDICINE

## 2021-06-22 PROCEDURE — 93000 ELECTROCARDIOGRAM COMPLETE: CPT | Performed by: INTERNAL MEDICINE

## 2021-06-22 ASSESSMENT — MIFFLIN-ST. JEOR: SCORE: 2071.58

## 2021-06-22 NOTE — PROGRESS NOTES
Service Date: 06/22/2021    INDICATION FOR FOLLOWUP VISIT:  Cardiac clearance for right shoulder surgery.    HISTORY OF PRESENT ILLNESS:  It is my pleasure to see your patient, Erich Craven.  He is a very pleasant 81-year-old patient with a past history of coronary artery disease with stenting of the proximal to mid right coronary artery and stenting of the proximal left anterior descending artery in 2014.  The patient has sick sinus syndrome for which he had a permanent pacemaker implanted.  He has got significant obstructive sleep apnea and a past history of paroxysmal atrial fibrillation.  He also has a history of pulmonary embolism.  This patient has a history of diastolic congestive heart failure and has chronic peripheral edema.  He did have a right heart catheterization performed in 11/2019 since it was difficult to determine if the patient's shortness of breath was due to heart failure or due to COPD.  The right heart catheterization at that time showed the wedge pressure was normal at 13 mmHg with mildly raised pulmonary pressures at 36/22.  The mean pulmonary artery pressure was 27 mmHg.  This proved that the patient's peripheral edema and shortness of breath was not due to congestive heart failure.  He also had a proBNP level drawn, which was completely normal as well.  The patient was due to have surgery in a week's time, but his pulmonologist actually canceled the surgery because of quite severe obstructive sleep apnea.    With respect the patient's symptoms, he has musculoskeletal type chest discomfort.  He sat down in a chair I believe 4 days ago and the arm of the chair broke and so he fell backwards.  Since that time, he has had some discomfort in his chest on inspiration and it is on either side of the sternum.  I was able to reproduce the pain by pressing on either side of the mid sternum.  This sounds distinctly musculoskeletal and this discomfort is in no way similar to the discomfort that he  had prior to the stenting procedures.  He did have a nuclear stress test performed.  This was read by my partner, Dr. 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 This would be regarded as a low risk nuclear stress test.  I suspect that the patient's obesity also may be contributing to these findings of a nontransmural infarct inferiorly.  This may represent tissue attenuation.  The patient is not complaining of any exertional angina pectoris.  He has no exertional, arm, throat or jaw discomfort.  He does not have symptoms nor physical findings of congestive heart failure.  The chronic peripheral edema as we know is not due to heart failure based upon the previous right heart catheterization and normal proBNP studies.    His pacemaker was last interrogated in March and the pacemaker is functioning normally.  He did have three mode switches which are less than 1% of the time.  He did have some ventricular high rates which were most likely due to SVT lasting 5-6 beats.  His blood pressure is well controlled today at 121/77.  A 12-lead electrocardiogram which was performed today shows that he is atrially paced and ventricularly sensed and the EKG does not show any ischemic changes.    IMPRESSION:    1.  This patient would be regarded as a low risk for an intraoperative cardiac event based upon the low risk nuclear stress test and the absence of any anginal symptoms.  The atypical chest discomfort he is complaining of since Saturday is almost certainly musculoskeletal, which occurred when the arm of his chair broke and he fell backwards.  This is reproducible on palpation.  2.  Diastolic heart failure.  The patient appears to be euvolemic.  3.  Severe obstructive sleep apnea.  The patient's pulmonologist has put the patient's surgery on hold.  4.   The patient again would be regarded as a low risk for intraoperative cardiac event based upon the fact that he does not have unstable angina pectoris, decompensated heart failure, malignant dysrhythmias, nor does he have severe obstructive valvular lesions.    PLAN:  The patient from a cardiac point of view could proceed ahead with surgery, but obviously, the pulmonologist has some concerns, and therefore, the pulmonologist will give their recommendations upon the feasibility of surgery for this patient.  If the patient can proceed ahead with surgery, he should take all of his cardiac medications on the morning of his procedure to prevent heart failure or angina pectoris.  Also, the patient's anticoagulation and aspirin will be held at the discretion of the orthopedic surgeon with respect to bleeding issues.  Obviously, those medications should be restarted as soon as possible after surgery based upon the bleeding risk.    It has been my pleasure to be involved in the care of this very nice patient.  As always, the patient has been told to contact me if he has any questions or any concerns.    Brent Portillo MD    cc:  Anne Heath MD  [address], [address]  [address]    Brent Dixon MD, [facility]        D: 2021   T: 2021   MT: robert    Name:     KHUSHBOO NINO  MRN:      [id number]        Account:      [id number]   :      1940           Service Date: 2021       Document: [id number]

## 2021-06-22 NOTE — PROGRESS NOTES
HPI and Plan:   See dictation    Orders Placed This Encounter   Procedures     EKG 12-lead complete w/read - Clinics (performed today)       No orders of the defined types were placed in this encounter.      There are no discontinued medications.      Encounter Diagnoses   Name Primary?     Paroxysmal A-fib (H) Yes     Coronary artery disease involving native coronary artery of native heart without angina pectoris      Cardiac pacemaker in situ      Heart failure with preserved ejection fraction, unspecified HF chronicity (H)      RIN (obstructive sleep apnea)      Paroxysmal atrial fibrillation (H)      Morbid obesity (H)      Other hyperlipidemia      Sick sinus syndrome (H)      Essential hypertension      Chronic diastolic heart failure (H)      Bilateral lower extremity edema        CURRENT MEDICATIONS:  Current Outpatient Medications   Medication Sig Dispense Refill     acetaminophen (TYLENOL) 500 MG tablet Take 1,000 mg by mouth 4 times daily        acetaminophen-codeine (TYLENOL #3) 300-30 MG tablet Take 1 tablet by mouth every 8 hours as needed for severe pain 30 tablet 0     Acetylcarnitine HCl (ACETYL L-CARNITINE) 500 MG CAPS Take 1 capsule by mouth 2 times daily       albuterol (PROAIR HFA/PROVENTIL HFA/VENTOLIN HFA) 108 (90 Base) MCG/ACT inhaler Inhale 2 puffs into the lungs every 4 hours as needed for shortness of breath / dyspnea or wheezing 1 Inhaler 0     aspirin (ASA) 81 MG chewable tablet Take 1 tablet by mouth daily       atorvastatin (LIPITOR) 40 MG tablet TAKE 1 TABLET BY MOUTH EVERY EVENING 90 tablet 2     carvedilol (COREG) 12.5 MG tablet Take 1 tablet (12.5 mg) by mouth 2 times daily 180 tablet 3     clonazePAM (KLONOPIN) 0.5 MG tablet TAKE 1 TABLET BY MOUTH TWICE  A DAY AS NEEDED, PLEASE KEEP INTAKE TO MINIMAL 30 tablet 0     co-enzyme Q-10 100 MG CAPS capsule Take 100 mg by mouth daily       diclofenac (VOLTAREN) 1 % topical gel Apply 1 g topically 2 times daily as needed for moderate pain  50 g 0     diltiazem ER COATED BEADS (CARDIZEM CD/CARTIA XT) 300 MG 24 hr capsule TAKE 1 CAPSULE BY MOUTH EVERY DAY 90 capsule 3     Fluticasone-Umeclidin-Vilanterol (TRELEGY ELLIPTA) 200-62.5-25 MCG/INH oral inhaler Inhale 1 puff into the lungs daily       furosemide (LASIX) 20 MG tablet Take 2 tablets (40 mg) by mouth 2 times daily 360 tablet 3     isosorbide mononitrate (IMDUR) 30 MG 24 hr tablet Take 1 tablet (30 mg) by mouth daily 90 tablet 3     levothyroxine (SYNTHROID/LEVOTHROID) 175 MCG tablet Take 1 tablet (175 mcg) by mouth daily Additional 1/2 tablet on Sundays 100 tablet 0     lidocaine (LMX4) 4 % external cream Apply topically as needed for mild pain       lisinopril (ZESTRIL) 40 MG tablet Take 1 tablet (40 mg) by mouth daily 90 tablet 3     metolazone (ZAROXOLYN) 2.5 MG tablet Take 1 tablet (2.5 mg) by mouth 2 times daily 180 tablet 3     Multiple vitamin TABS Take 1 tablet by mouth daily       order for DME Oxygen 2 Li/min  Continuous. At night bled into PAP device. In the daytime, NC with bubbler: requiring portability 1 Device 0     sertraline (ZOLOFT) 25 MG tablet Take 2 tablets (50 mg) by mouth daily 180 tablet 1     spironolactone (ALDACTONE) 25 MG tablet Take 1 tablet (25 mg) by mouth every other day 30 tablet 1     tamsulosin (FLOMAX) 0.4 MG capsule Take 1 capsule (0.4 mg) by mouth daily 90 capsule 0     Taurine 500 MG CAPS Take 500 mg by mouth 2 times daily       warfarin ANTICOAGULANT (COUMADIN) 5 MG tablet Take 1.5 tablets (7.5 mg) on Fridays and take 1 tablet (5 mg) all other days or as directed by the INR clinic 100 tablet 0       ALLERGIES     Allergies   Allergen Reactions     No Known Allergies        PAST MEDICAL HISTORY:  Past Medical History:   Diagnosis Date     (HFpEF) heart failure with preserved ejection fraction (H)      BPH with urinary obstruction      CAD (coronary artery disease)     PCI w/ SUMMER in 2014     Cardiac pacemaker in situ      Chronic diastolic heart failure (H)       COPD (chronic obstructive pulmonary disease) (H)      DVT (deep venous thrombosis) (H)      Dyspnea      Essential hypertension      Fatigue      HLD (hyperlipidemia)      Hypothyroidism      Lymphedema      Obesity, Class III, BMI 40-49.9 (morbid obesity) (H)      RIN (obstructive sleep apnea)     has refused CPAP     Paroxysmal A-fib (H)      PE (pulmonary thromboembolism) (H)        PAST SURGICAL HISTORY:  Past Surgical History:   Procedure Laterality Date     cardiac stenting       CV RIGHT HEART CATH MEASUREMENTS RECORDED N/A 11/5/2019    Procedure: Right Heart Cath;  Surgeon: Roberto Hernandez MD;  Location:  [facility]     ENT SURGERY      tonsillectomy     IMPLANT PACEMAKER       ORTHOPEDIC SURGERY Right 2012    knee replacement       FAMILY HISTORY:  Family History   Problem Relation Age of Onset     Hypertension Father        SOCIAL HISTORY:  Social History     Socioeconomic History     Marital status: Single     Spouse name: None     Number of children: None     Years of education: None     Highest education level: None   Occupational History     None   Social Needs     Financial resource strain: None     Food insecurity     Worry: None     Inability: None     Transportation needs     Medical: None     Non-medical: None   Tobacco Use     Smoking status: Former Smoker     Packs/day: 0.50     Years: 25.00     Pack years: 12.50     Smokeless tobacco: Never Used     Tobacco comment: quit in 1989   Substance and Sexual Activity     Alcohol use: No     Frequency: Never     Comment: quit in 1989; recovering     Drug use: No     Sexual activity: Not Currently     Partners: Female   Lifestyle     Physical activity     Days per week: None     Minutes per session: None     Stress: None   Relationships     Social connections     Talks on phone: None     Gets together: None     Attends Hindu service: None     Active member of club or organization: None     Attends meetings of clubs or  organizations: None     Relationship status: None     Intimate partner violence     Fear of current or ex partner: None     Emotionally abused: None     Physically abused: None     Forced sexual activity: None   Other Topics Concern     Parent/sibling w/ CABG, MI or angioplasty before 65F 55M? Not Asked   Social History Narrative     None       Review of Systems:  Skin:  Negative       Eyes:  Negative      ENT:  Negative      Respiratory:  Positive for dyspnea on exertion;shortness of breath;CPAP;sleep apnea     Cardiovascular:    Positive for;chest pain;edema;lower extremity symptoms;fatigue chest pressure/pain with deep breathing  Gastroenterology: Negative      Genitourinary:  Negative      Musculoskeletal:  Positive for(shoulder pain)      Neurologic:  Negative      Psychiatric:  Negative      Heme/Lymph/Imm:  Negative      Endocrine:  Positive for thyroid disorder      Physical Exam:  Vitals: /77   Pulse 60   Ht 1.829 m (6')   Wt 133.4 kg (294 lb)   SpO2 94%   BMI 39.87 kg/m      Constitutional:  cooperative, alert and oriented, well developed, well nourished, in no acute distress        Skin:  warm and dry to the touch venous stasis changes        Head:  no masses or lesions        Eyes:  pupils equal and round        Lymph:No Cervical lymphadenopathy present     ENT:  no pallor or cyanosis        Neck:  carotid pulses are full and equal bilaterally, JVP normal, no carotid bruit        Respiratory:  normal breath sounds, clear to auscultation, normal A-P diameter, normal symmetry, normal respiratory excursion, no use of accessory muscles         Cardiac: regular rhythm, normal S1/S2, no S3 or S4, apical impulse not displaced, no murmurs, gallops or rubs   distant heart sounds            pulses full and equal, no bruits auscultated                                        GI:    obese      Extremities and Muscular Skeletal:  no deformities, clubbing, cyanosis, erythema observed stasis pigmentation  bilateral LE edema;1+          Neurological:  no gross motor deficits;affect appropriate        Psych:  Alert and Oriented x 3        CC  No referring provider defined for this encounter.

## 2021-06-22 NOTE — LETTER
6/22/2021    Anne Heath MD  [address]  [facility] [address]    RE: Erich Craven       Dear Colleague,    I had the pleasure of seeing Erich Craven in the [facility].    HPI and Plan:   See dictation    Orders Placed This Encounter   Procedures     EKG 12-lead complete w/read - Clinics (performed today)       No orders of the defined types were placed in this encounter.      There are no discontinued medications.      Encounter Diagnoses   Name Primary?     Paroxysmal A-fib (H) Yes     Coronary artery disease involving native coronary artery of native heart without angina pectoris      Cardiac pacemaker in situ      Heart failure with preserved ejection fraction, unspecified HF chronicity (H)      RIN (obstructive sleep apnea)      Paroxysmal atrial fibrillation (H)      Morbid obesity (H)      Other hyperlipidemia      Sick sinus syndrome (H)      Essential hypertension      Chronic diastolic heart failure (H)      Bilateral lower extremity edema        CURRENT MEDICATIONS:  Current Outpatient Medications   Medication Sig Dispense Refill     acetaminophen (TYLENOL) 500 MG tablet Take 1,000 mg by mouth 4 times daily        acetaminophen-codeine (TYLENOL #3) 300-30 MG tablet Take 1 tablet by mouth every 8 hours as needed for severe pain 30 tablet 0     Acetylcarnitine HCl (ACETYL L-CARNITINE) 500 MG CAPS Take 1 capsule by mouth 2 times daily       albuterol (PROAIR HFA/PROVENTIL HFA/VENTOLIN HFA) 108 (90 Base) MCG/ACT inhaler Inhale 2 puffs into the lungs every 4 hours as needed for shortness of breath / dyspnea or wheezing 1 Inhaler 0     aspirin (ASA) 81 MG chewable tablet Take 1 tablet by mouth daily       atorvastatin (LIPITOR) 40 MG tablet TAKE 1 TABLET BY MOUTH EVERY EVENING 90 tablet 2     carvedilol (COREG) 12.5 MG tablet Take 1 tablet (12.5 mg) by mouth 2 times daily 180 tablet 3     clonazePAM (KLONOPIN) 0.5 MG tablet TAKE 1  TABLET BY MOUTH TWICE  A DAY AS NEEDED, PLEASE KEEP INTAKE TO MINIMAL 30 tablet 0     co-enzyme Q-10 100 MG CAPS capsule Take 100 mg by mouth daily       diclofenac (VOLTAREN) 1 % topical gel Apply 1 g topically 2 times daily as needed for moderate pain 50 g 0     diltiazem ER COATED BEADS (CARDIZEM CD/CARTIA XT) 300 MG 24 hr capsule TAKE 1 CAPSULE BY MOUTH EVERY DAY 90 capsule 3     Fluticasone-Umeclidin-Vilanterol (TRELEGY ELLIPTA) 200-62.5-25 MCG/INH oral inhaler Inhale 1 puff into the lungs daily       furosemide (LASIX) 20 MG tablet Take 2 tablets (40 mg) by mouth 2 times daily 360 tablet 3     isosorbide mononitrate (IMDUR) 30 MG 24 hr tablet Take 1 tablet (30 mg) by mouth daily 90 tablet 3     levothyroxine (SYNTHROID/LEVOTHROID) 175 MCG tablet Take 1 tablet (175 mcg) by mouth daily Additional 1/2 tablet on Sundays 100 tablet 0     lidocaine (LMX4) 4 % external cream Apply topically as needed for mild pain       lisinopril (ZESTRIL) 40 MG tablet Take 1 tablet (40 mg) by mouth daily 90 tablet 3     metolazone (ZAROXOLYN) 2.5 MG tablet Take 1 tablet (2.5 mg) by mouth 2 times daily 180 tablet 3     Multiple vitamin TABS Take 1 tablet by mouth daily       order for DME Oxygen 2 Li/min  Continuous. At night bled into PAP device. In the daytime, NC with bubbler: requiring portability 1 Device 0     sertraline (ZOLOFT) 25 MG tablet Take 2 tablets (50 mg) by mouth daily 180 tablet 1     spironolactone (ALDACTONE) 25 MG tablet Take 1 tablet (25 mg) by mouth every other day 30 tablet 1     tamsulosin (FLOMAX) 0.4 MG capsule Take 1 capsule (0.4 mg) by mouth daily 90 capsule 0     Taurine 500 MG CAPS Take 500 mg by mouth 2 times daily       warfarin ANTICOAGULANT (COUMADIN) 5 MG tablet Take 1.5 tablets (7.5 mg) on Fridays and take 1 tablet (5 mg) all other days or as directed by the INR clinic 100 tablet 0       ALLERGIES     Allergies   Allergen Reactions     No Known Allergies        PAST MEDICAL HISTORY:  Past Medical  History:   Diagnosis Date     (HFpEF) heart failure with preserved ejection fraction (H)      BPH with urinary obstruction      CAD (coronary artery disease)     PCI w/ SUMMER in 2014     Cardiac pacemaker in situ      Chronic diastolic heart failure (H)      COPD (chronic obstructive pulmonary disease) (H)      DVT (deep venous thrombosis) (H)      Dyspnea      Essential hypertension      Fatigue      HLD (hyperlipidemia)      Hypothyroidism      Lymphedema      Obesity, Class III, BMI 40-49.9 (morbid obesity) (H)      RIN (obstructive sleep apnea)     has refused CPAP     Paroxysmal A-fib (H)      PE (pulmonary thromboembolism) (H)        PAST SURGICAL HISTORY:  Past Surgical History:   Procedure Laterality Date     cardiac stenting       CV RIGHT HEART CATH MEASUREMENTS RECORDED N/A 11/5/2019    Procedure: Right Heart Cath;  Surgeon: Roberto Hernandez MD;  Location:  [facility]     ENT SURGERY      tonsillectomy     IMPLANT PACEMAKER       ORTHOPEDIC SURGERY Right 2012    knee replacement       FAMILY HISTORY:  Family History   Problem Relation Age of Onset     Hypertension Father        SOCIAL HISTORY:  Social History     Socioeconomic History     Marital status: Single     Spouse name: None     Number of children: None     Years of education: None     Highest education level: None   Occupational History     None   Social Needs     Financial resource strain: None     Food insecurity     Worry: None     Inability: None     Transportation needs     Medical: None     Non-medical: None   Tobacco Use     Smoking status: Former Smoker     Packs/day: 0.50     Years: 25.00     Pack years: 12.50     Smokeless tobacco: Never Used     Tobacco comment: quit in 1989   Substance and Sexual Activity     Alcohol use: No     Frequency: Never     Comment: quit in 1989; recovering     Drug use: No     Sexual activity: Not Currently     Partners: Female   Lifestyle     Physical activity     Days per week: None      Minutes per session: None     Stress: None   Relationships     Social connections     Talks on phone: None     Gets together: None     Attends Bahai service: None     Active member of club or organization: None     Attends meetings of clubs or organizations: None     Relationship status: None     Intimate partner violence     Fear of current or ex partner: None     Emotionally abused: None     Physically abused: None     Forced sexual activity: None   Other Topics Concern     Parent/sibling w/ CABG, MI or angioplasty before 65F 55M? Not Asked   Social History Narrative     None       Review of Systems:  Skin:  Negative       Eyes:  Negative      ENT:  Negative      Respiratory:  Positive for dyspnea on exertion;shortness of breath;CPAP;sleep apnea     Cardiovascular:    Positive for;chest pain;edema;lower extremity symptoms;fatigue chest pressure/pain with deep breathing  Gastroenterology: Negative      Genitourinary:  Negative      Musculoskeletal:  Positive for(shoulder pain)      Neurologic:  Negative      Psychiatric:  Negative      Heme/Lymph/Imm:  Negative      Endocrine:  Positive for thyroid disorder      Physical Exam:  Vitals: /77   Pulse 60   Ht 1.829 m (6')   Wt 133.4 kg (294 lb)   SpO2 94%   BMI 39.87 kg/m      Constitutional:  cooperative, alert and oriented, well developed, well nourished, in no acute distress        Skin:  warm and dry to the touch venous stasis changes        Head:  no masses or lesions        Eyes:  pupils equal and round        Lymph:No Cervical lymphadenopathy present     ENT:  no pallor or cyanosis        Neck:  carotid pulses are full and equal bilaterally, JVP normal, no carotid bruit        Respiratory:  normal breath sounds, clear to auscultation, normal A-P diameter, normal symmetry, normal respiratory excursion, no use of accessory muscles         Cardiac: regular rhythm, normal S1/S2, no S3 or S4, apical impulse not displaced, no murmurs, gallops or rubs    distant heart sounds            pulses full and equal, no bruits auscultated                                        GI:    obese      Extremities and Muscular Skeletal:  no deformities, clubbing, cyanosis, erythema observed stasis pigmentation bilateral LE edema;1+          Neurological:  no gross motor deficits;affect appropriate        Psych:  Alert and Oriented x 3      Thank you for allowing me to participate in the care of your patient.      Sincerely,     Brent Portillo MD, MD     [facility]    cc:   No referring provider defined for this encounter.

## 2021-06-23 ENCOUNTER — VIRTUAL VISIT (OUTPATIENT)
Dept: NEPHROLOGY | Facility: CLINIC | Age: 81
End: 2021-06-23
Payer: MEDICARE

## 2021-06-23 VITALS — HEIGHT: 72 IN | BODY MASS INDEX: 39.82 KG/M2 | WEIGHT: 294 LBS

## 2021-06-23 DIAGNOSIS — N18.31 ANEMIA IN STAGE 3A CHRONIC KIDNEY DISEASE (H): ICD-10-CM

## 2021-06-23 DIAGNOSIS — D63.1 ANEMIA IN STAGE 3A CHRONIC KIDNEY DISEASE (H): ICD-10-CM

## 2021-06-23 DIAGNOSIS — N18.32 STAGE 3B CHRONIC KIDNEY DISEASE (H): Primary | ICD-10-CM

## 2021-06-23 PROCEDURE — 99214 OFFICE O/P EST MOD 30 MIN: CPT | Mod: 95

## 2021-06-23 ASSESSMENT — MIFFLIN-ST. JEOR: SCORE: 2071.58

## 2021-06-23 NOTE — LETTER
6/23/2021       RE: Erich Craven  [address]  [place] [address]     Dear Colleague,    Thank you for referring your patient, Erich Craven, to the [facility]PHROLOG[facility] [place] at [facility]. Please see a copy of my visit note below.    Chief Complaint   Patient presents with     RECHECK   Height 1.829 m (6'), weight 133.4 kg (294 lb).    Jaun is a 81 year old who is being evaluated via a billable video visit.      How would you like to obtain your AVS? Avubahart  If the video visit is dropped, the invitation should be resent by: Other e-mail: USE [employer]  Will anyone else be joining your video visit? No      Video Start Time: 1400  Video-Visit Details    Type of service:  Video Visit    Video End Time:1425    Originating Location (pt. Location): home    Distant Location (provider location):  [facility] [place]     Platform used for Video Visit: well          Nephrology Video Visit 6/23/21    Assessment and Plan:    1. CKD3b w/mild proteinuria - Stable. Creat 1.5, eGFR 40 ml/mn, UPCR 0.3 g/gCr  Baseline creat mid to upper 1's since 8/20 w/recurrent DONA   - Etiology of his CKD likely HTN   - Renal US normal   - Does not use NSAIDs   - Blood pressure at goal   - DM well controlled with A1c of 6.1%   - He is on statin for CV risk reduction    2. CV/HTN/Pafib, Diastolic HF - Currently well controlled with chronic edema recently improved with resumption of Spironolactone. Clinic readings teens - 120/.   Weight 133.3 kg. Denies dyspnea  Current regimen:    Coreg 12.5 mg bid   Lasix 40 mg bid   Spironolactone 25 mg every day   Lisinopril 40 mg every day   Diltiazem  mg every day   Imdur 30 mg every day   Metolazone 2.5 mg bid    - Continue current regimen    3. Electrolytes - No acute concerns. K 4.1 Na 138    4. Acid base - No acute concerns. Bicarb 29    5. BMD - Ca 9.4 Phos 4.4 albumin 3.6   Vit D 34,   ( goal < 70)   Will begin low dose Vit D next visit    6. BPH - Controlled with Flomax    7. Anemia - Hgb 12.2   - No recent iron studies. Will check next visit    8. Disposition - RTC 4 months for follow up    Assessment and plan was discussed with patient and he voiced his understanding and agreement.    Reason for Visit:  CKD/HTN/review of renal US    HPI:  Mr Craven is an 80 yo male with CKD3b, COPD, RIN on CPAP, Diastolic HF, SSS s/p PPM, CAD, P-Afib, HTN, BPH, h/o Pulmonary embolism, present today for review of his renal ultrasound and CKD follow up. Patient was seen by Dr Bowman for consult visit on 6/16/21  No changes were made to antihypertensive medications  Baseline creat mid to upper 1's    ROS:   Feeling pretty well.  Edema has declined since resuming Spironolactone. He is also working harder on sodium restriction and wearing compression stockings  Will be having repeat sleep study given questions regarding the efficacy of his current equipment  Clinic blood pressures running in the teens - 120/  Has completed his COVID vax series  Denies CP/dyspnea  No abdominal concerns  Voiding w/o complication. On Flomax  Appetite is good. Has been a vegetarian for years    Chronic Health Problems:    CKD3b  COPD  RIN on CPAP  Diastolic HF  SSS s/p PPM  CAD  P-Afib  h/o Pulmonary embolism  HTN  Hypothyroidism  Anxiety  BPH  HLD    Family Hx:   Family History   Problem Relation Age of Onset     Hypertension Father      Personal Hx:   , lives with his puppy. NS    Allergies:  Allergies   Allergen Reactions     No Known Allergies        Medications:  Current Outpatient Medications   Medication Sig     acetaminophen (TYLENOL) 500 MG tablet Take 1,000 mg by mouth 4 times daily      acetaminophen-codeine (TYLENOL #3) 300-30 MG tablet Take 1 tablet by mouth every 8 hours as needed for severe pain     Acetylcarnitine HCl (ACETYL L-CARNITINE) 500 MG CAPS Take 1 capsule by mouth 2 times daily     albuterol  (PROAIR HFA/PROVENTIL HFA/VENTOLIN HFA) 108 (90 Base) MCG/ACT inhaler Inhale 2 puffs into the lungs every 4 hours as needed for shortness of breath / dyspnea or wheezing     aspirin (ASA) 81 MG chewable tablet Take 1 tablet by mouth daily     atorvastatin (LIPITOR) 40 MG tablet TAKE 1 TABLET BY MOUTH EVERY EVENING     carvedilol (COREG) 12.5 MG tablet Take 1 tablet (12.5 mg) by mouth 2 times daily     clonazePAM (KLONOPIN) 0.5 MG tablet TAKE 1 TABLET BY MOUTH TWICE  A DAY AS NEEDED, PLEASE KEEP INTAKE TO MINIMAL     co-enzyme Q-10 100 MG CAPS capsule Take 100 mg by mouth daily     diclofenac (VOLTAREN) 1 % topical gel Apply 1 g topically 2 times daily as needed for moderate pain     diltiazem ER COATED BEADS (CARDIZEM CD/CARTIA XT) 300 MG 24 hr capsule TAKE 1 CAPSULE BY MOUTH EVERY DAY     Fluticasone-Umeclidin-Vilanterol (TRELEGY ELLIPTA) 200-62.5-25 MCG/INH oral inhaler Inhale 1 puff into the lungs daily     furosemide (LASIX) 20 MG tablet Take 2 tablets (40 mg) by mouth 2 times daily     isosorbide mononitrate (IMDUR) 30 MG 24 hr tablet TAKE 1 TABLET BY MOUTH EVERY DAY     levothyroxine (SYNTHROID/LEVOTHROID) 175 MCG tablet Take 1 tablet (175 mcg) by mouth daily Additional 1/2 tablet on Sundays     lidocaine (LMX4) 4 % external cream Apply topically as needed for mild pain     lisinopril (ZESTRIL) 40 MG tablet Take 1 tablet (40 mg) by mouth daily     metolazone (ZAROXOLYN) 2.5 MG tablet Take 1 tablet (2.5 mg) by mouth 2 times daily     Multiple vitamin TABS Take 1 tablet by mouth daily     order for DME Oxygen 2 Li/min  Continuous. At night bled into PAP device. In the daytime, NC with bubbler: requiring portability     sertraline (ZOLOFT) 25 MG tablet Take 2 tablets (50 mg) by mouth daily     spironolactone (ALDACTONE) 25 MG tablet Take 1 tablet (25 mg) by mouth every other day     tamsulosin (FLOMAX) 0.4 MG capsule Take 1 capsule (0.4 mg) by mouth daily     Taurine 500 MG CAPS Take 500 mg by mouth 2 times daily      warfarin ANTICOAGULANT (COUMADIN) 5 MG tablet Take 1.5 tablets (7.5 mg) on Fridays and take 1 tablet (5 mg) all other days or as directed by the INR clinic     No current facility-administered medications for this visit.       Vitals:  Ht 1.829 m (6')   Wt 133.4 kg (294 lb)   BMI 39.87 kg/m      Exam:  GEN: Pleasant male in NAD  RESP: Breathing is non labored. Speaking in full sentences  NEURO: A/O  PSYCH: Bright affect, engaging     LABS:   CMP  Recent Labs   Lab Test 06/21/21  1454 06/14/21  1455 04/21/21  0931 03/02/21  1405    137 138 136   POTASSIUM 4.1 4.7 3.7 4.5   CHLORIDE 103 105 99 104   CO2 29 28 39* 28   ANIONGAP 6 4 <1* 4   * 108* 104* 93   BUN 64* 62* 44* 57*   CR 1.57* 1.81* 1.02 1.42*   GFRESTIMATED 40* 34* 68 46*   GFRESTBLACK 47* 39* 79 53*   LAWSON 9.4 9.2 9.2 9.6     Recent Labs   Lab Test 02/19/21  1528 08/11/20  1343 07/14/20  1532 01/22/20  1216 02/27/19   BILITOTAL 0.4 0.5  --  0.5  --    ALKPHOS 107 104  --  112  --    ALT 26 49 45 45  --    AST 14 31  --  25 25     CBC  Recent Labs   Lab Test 06/14/21  1455 03/24/21  1805 03/04/21  1932 02/19/21  1528   HGB 12.2* 11.9* 11.4* 12.0*   WBC 10.2 10.4 10.4 10.6   RBC 3.68* 3.65* 3.49* 3.78*   HCT 36.7* 37.3* 36.1* 38.8*    102* 103* 103*   MCH 33.2* 32.6 32.7 31.7   MCHC 33.2 31.9 31.6 30.9*   RDW 15.6* 15.8* 15.7* 15.3*    208 185 200     URINE STUDIES  Recent Labs   Lab Test 06/14/21  1516 04/21/21  0853 12/21/20  1219 12/14/20  1706 08/11/20  1344   COLOR Yellow Yellow Yellow Yellow Yellow   APPEARANCE Cloudy Clear Clear Clear Clear   URINEGLC Negative Negative Negative Negative Negative   URINEBILI Negative Negative Negative Negative Negative   URINEKETONE Negative Negative Negative Negative Negative   SG 1.020 1.015 1.015 1.015 1.020   UBLD Small* Negative Negative Trace* Negative   URINEPH 5.0 7.0 5.0 6.0 6.5   PROTEIN 30* Negative Negative 30* Negative   UROBILINOGEN 0.2 0.2 0.2 0.2 0.2   NITRITE Negative  Negative Negative Negative Negative   LEUKEST Moderate* Negative Small* Small* Small*   RBCU Unable to quantitate other elements due to packed WBC's. *  --  O - 2 2-5* 2-5*   WBCU >100*  --  10-25* 25-50* 25-50*     Recent Labs   Lab Test 06/14/21  1516   UTPG 0.38*     PTH  Recent Labs   Lab Test 06/14/21  1455   PTHI 107*     IRON STUDIES  Recent Labs   Lab Test 02/19/21  1528   SEGUNDO 97     US RENAL COMPLETE 6/18/2021 2:09 PM     CLINICAL HISTORY: DONA on CKD; Acute kidney injury (H); Chronic kidney  disease, stage 3a  TECHNIQUE: Routine Bilateral Renal and Bladder Ultrasound.     COMPARISON: None.     FINDINGS:     RIGHT KIDNEY: 10 x 6 x 5 cm. Normal without hydronephrosis or masses.      LEFT KIDNEY: 11 x 6 x 6 cm. Normal without hydronephrosis or masses.      BLADDER: Decompressed.                                                                      IMPRESSION:  1.  Normal kidney ultrasound.      Tiffany Suero, NP

## 2021-06-23 NOTE — PROGRESS NOTES
Chief Complaint   Patient presents with     RECHECK   Height 1.829 m (6'), weight 133.4 kg (294 lb).    Jaun is a 81 year old who is being evaluated via a billable video visit.      How would you like to obtain your AVS? [employer]  If the video visit is dropped, the invitation should be resent by: Other e-mail: USE registracija vozila  Will anyone else be joining your video visit? No      Video Start Time: 1400  Video-Visit Details    Type of service:  Video Visit    Video End Time:1425    Originating Location (pt. Location): home    Distant Location (provider location):  [facility] NEPHROLOGY [facility]IN[facility] [place]     Platform used for Video Visit: [employer]

## 2021-06-24 ENCOUNTER — ANCILLARY PROCEDURE (OUTPATIENT)
Dept: CARDIOLOGY | Facility: CLINIC | Age: 81
End: 2021-06-24
Attending: INTERNAL MEDICINE
Payer: MEDICARE

## 2021-06-24 DIAGNOSIS — Z95.0 CARDIAC PACEMAKER IN SITU: ICD-10-CM

## 2021-06-24 PROCEDURE — 93294 REM INTERROG EVL PM/LDLS PM: CPT | Performed by: INTERNAL MEDICINE

## 2021-06-24 PROCEDURE — 93296 REM INTERROG EVL PM/IDS: CPT | Performed by: INTERNAL MEDICINE

## 2021-06-24 NOTE — PROGRESS NOTES
Nephrology Video Visit 6/23/21    Assessment and Plan:    1. CKD3b w/mild proteinuria - Stable. Creat 1.5, eGFR 40 ml/mn, UPCR 0.3 g/gCr  Baseline creat mid to upper 1's since 8/20 w/recurrent DONA   - Etiology of his CKD likely HTN   - Renal US normal   - Does not use NSAIDs   - Blood pressure at goal   - DM well controlled with A1c of 6.1%   - He is on statin for CV risk reduction    2. CV/HTN/Pafib, Diastolic HF - Currently well controlled with chronic edema recently improved with resumption of Spironolactone. Clinic readings teens - 120/.   Weight 133.3 kg. Denies dyspnea  Current regimen:    Coreg 12.5 mg bid   Lasix 40 mg bid   Spironolactone 25 mg every day   Lisinopril 40 mg every day   Diltiazem  mg every day   Imdur 30 mg every day   Metolazone 2.5 mg bid    - Continue current regimen    3. Electrolytes - No acute concerns. K 4.1 Na 138    4. Acid base - No acute concerns. Bicarb 29    5. BMD - Ca 9.4 Phos 4.4 albumin 3.6   Vit D 34,  ( goal < 70)   Will begin low dose Vit D next visit    6. BPH - Controlled with Flomax    7. Anemia - Hgb 12.2   - No recent iron studies. Will check next visit    8. Disposition - RTC 4 months for follow up    Assessment and plan was discussed with patient and he voiced his understanding and agreement.    Reason for Visit:  CKD/HTN/review of renal US    HPI:  Mr Craven is an 80 yo male with CKD3b, COPD, RIN on CPAP, Diastolic HF, SSS s/p PPM, CAD, P-Afib, HTN, BPH, h/o Pulmonary embolism, present today for review of his renal ultrasound and CKD follow up. Patient was seen by Dr Bowman for consult visit on 6/16/21  No changes were made to antihypertensive medications  Baseline creat mid to upper 1's    ROS:   Feeling pretty well.  Edema has declined since resuming Spironolactone. He is also working harder on sodium restriction and wearing compression stockings  Will be having repeat sleep study given questions regarding the efficacy of his current equipment  Clinic  blood pressures running in the teens - 120/  Has completed his COVID vax series  Denies CP/dyspnea  No abdominal concerns  Voiding w/o complication. On Flomax  Appetite is good. Has been a vegetarian for years    Chronic Health Problems:    CKD3b  COPD  RIN on CPAP  Diastolic HF  SSS s/p PPM  CAD  P-Afib  h/o Pulmonary embolism  HTN  Hypothyroidism  Anxiety  BPH  HLD    Family Hx:   Family History   Problem Relation Age of Onset     Hypertension Father      Personal Hx:   , lives with his puppy. NS    Allergies:  Allergies   Allergen Reactions     No Known Allergies        Medications:  Current Outpatient Medications   Medication Sig     acetaminophen (TYLENOL) 500 MG tablet Take 1,000 mg by mouth 4 times daily      acetaminophen-codeine (TYLENOL #3) 300-30 MG tablet Take 1 tablet by mouth every 8 hours as needed for severe pain     Acetylcarnitine HCl (ACETYL L-CARNITINE) 500 MG CAPS Take 1 capsule by mouth 2 times daily     albuterol (PROAIR HFA/PROVENTIL HFA/VENTOLIN HFA) 108 (90 Base) MCG/ACT inhaler Inhale 2 puffs into the lungs every 4 hours as needed for shortness of breath / dyspnea or wheezing     aspirin (ASA) 81 MG chewable tablet Take 1 tablet by mouth daily     atorvastatin (LIPITOR) 40 MG tablet TAKE 1 TABLET BY MOUTH EVERY EVENING     carvedilol (COREG) 12.5 MG tablet Take 1 tablet (12.5 mg) by mouth 2 times daily     clonazePAM (KLONOPIN) 0.5 MG tablet TAKE 1 TABLET BY MOUTH TWICE  A DAY AS NEEDED, PLEASE KEEP INTAKE TO MINIMAL     co-enzyme Q-10 100 MG CAPS capsule Take 100 mg by mouth daily     diclofenac (VOLTAREN) 1 % topical gel Apply 1 g topically 2 times daily as needed for moderate pain     diltiazem ER COATED BEADS (CARDIZEM CD/CARTIA XT) 300 MG 24 hr capsule TAKE 1 CAPSULE BY MOUTH EVERY DAY     Fluticasone-Umeclidin-Vilanterol (TRELEGY ELLIPTA) 200-62.5-25 MCG/INH oral inhaler Inhale 1 puff into the lungs daily     furosemide (LASIX) 20 MG tablet Take 2 tablets (40 mg) by mouth 2  times daily     isosorbide mononitrate (IMDUR) 30 MG 24 hr tablet TAKE 1 TABLET BY MOUTH EVERY DAY     levothyroxine (SYNTHROID/LEVOTHROID) 175 MCG tablet Take 1 tablet (175 mcg) by mouth daily Additional 1/2 tablet on Sundays     lidocaine (LMX4) 4 % external cream Apply topically as needed for mild pain     lisinopril (ZESTRIL) 40 MG tablet Take 1 tablet (40 mg) by mouth daily     metolazone (ZAROXOLYN) 2.5 MG tablet Take 1 tablet (2.5 mg) by mouth 2 times daily     Multiple vitamin TABS Take 1 tablet by mouth daily     order for DME Oxygen 2 Li/min  Continuous. At night bled into PAP device. In the daytime, NC with bubbler: requiring portability     sertraline (ZOLOFT) 25 MG tablet Take 2 tablets (50 mg) by mouth daily     spironolactone (ALDACTONE) 25 MG tablet Take 1 tablet (25 mg) by mouth every other day     tamsulosin (FLOMAX) 0.4 MG capsule Take 1 capsule (0.4 mg) by mouth daily     Taurine 500 MG CAPS Take 500 mg by mouth 2 times daily     warfarin ANTICOAGULANT (COUMADIN) 5 MG tablet Take 1.5 tablets (7.5 mg) on Fridays and take 1 tablet (5 mg) all other days or as directed by the INR clinic     No current facility-administered medications for this visit.       Vitals:  Ht 1.829 m (6')   Wt 133.4 kg (294 lb)   BMI 39.87 kg/m      Exam:  GEN: Pleasant male in NAD  RESP: Breathing is non labored. Speaking in full sentences  NEURO: A/O  PSYCH: Bright affect, engaging     LABS:   CMP  Recent Labs   Lab Test 06/21/21  1454 06/14/21  1455 04/21/21  0931 03/02/21  1405    137 138 136   POTASSIUM 4.1 4.7 3.7 4.5   CHLORIDE 103 105 99 104   CO2 29 28 39* 28   ANIONGAP 6 4 <1* 4   * 108* 104* 93   BUN 64* 62* 44* 57*   CR 1.57* 1.81* 1.02 1.42*   GFRESTIMATED 40* 34* 68 46*   GFRESTBLACK 47* 39* 79 53*   LAWSON 9.4 9.2 9.2 9.6     Recent Labs   Lab Test 02/19/21  1528 08/11/20  1343 07/14/20  1532 01/22/20  1216 02/27/19   BILITOTAL 0.4 0.5  --  0.5  --    ALKPHOS 107 104  --  112  --    ALT 26 49 45 45   --    AST 14 31  --  25 25     CBC  Recent Labs   Lab Test 06/14/21  1455 03/24/21  1805 03/04/21  1932 02/19/21  1528   HGB 12.2* 11.9* 11.4* 12.0*   WBC 10.2 10.4 10.4 10.6   RBC 3.68* 3.65* 3.49* 3.78*   HCT 36.7* 37.3* 36.1* 38.8*    102* 103* 103*   MCH 33.2* 32.6 32.7 31.7   MCHC 33.2 31.9 31.6 30.9*   RDW 15.6* 15.8* 15.7* 15.3*    208 185 200     URINE STUDIES  Recent Labs   Lab Test 06/14/21  1516 04/21/21  0853 12/21/20  1219 12/14/20  1706 08/11/20  1344   COLOR Yellow Yellow Yellow Yellow Yellow   APPEARANCE Cloudy Clear Clear Clear Clear   URINEGLC Negative Negative Negative Negative Negative   URINEBILI Negative Negative Negative Negative Negative   URINEKETONE Negative Negative Negative Negative Negative   SG 1.020 1.015 1.015 1.015 1.020   UBLD Small* Negative Negative Trace* Negative   URINEPH 5.0 7.0 5.0 6.0 6.5   PROTEIN 30* Negative Negative 30* Negative   UROBILINOGEN 0.2 0.2 0.2 0.2 0.2   NITRITE Negative Negative Negative Negative Negative   LEUKEST Moderate* Negative Small* Small* Small*   RBCU Unable to quantitate other elements due to packed WBC's. *  --  O - 2 2-5* 2-5*   WBCU >100*  --  10-25* 25-50* 25-50*     Recent Labs   Lab Test 06/14/21  1516   UTPG 0.38*     PTH  Recent Labs   Lab Test 06/14/21  1455   PTHI 107*     IRON STUDIES  Recent Labs   Lab Test 02/19/21  1528   SEGUNDO 97     US RENAL COMPLETE 6/18/2021 2:09 PM     CLINICAL HISTORY: DONA on CKD; Acute kidney injury (H); Chronic kidney  disease, stage 3a  TECHNIQUE: Routine Bilateral Renal and Bladder Ultrasound.     COMPARISON: None.     FINDINGS:     RIGHT KIDNEY: 10 x 6 x 5 cm. Normal without hydronephrosis or masses.      LEFT KIDNEY: 11 x 6 x 6 cm. Normal without hydronephrosis or masses.      BLADDER: Decompressed.                                                                      IMPRESSION:  1.  Normal kidney ultrasound.      Tiffany Suero, NP

## 2021-07-01 ENCOUNTER — TRANSFERRED RECORDS (OUTPATIENT)
Dept: HEALTH INFORMATION MANAGEMENT | Facility: CLINIC | Age: 81
End: 2021-07-01

## 2021-07-02 ENCOUNTER — MYC MEDICAL ADVICE (OUTPATIENT)
Dept: FAMILY MEDICINE | Facility: CLINIC | Age: 81
End: 2021-07-02

## 2021-07-02 NOTE — TELEPHONE ENCOUNTER
Dr. Heath, there is a RX refill for the patient but it was sent to Dr. Roger. Could you please see the patient's   My Chart ?  The medication for Acetaminophen-Codeine is pended.     Tiffany Singh RN  [facility]

## 2021-07-05 NOTE — TELEPHONE ENCOUNTER
Patient called to follow up on tylenol script.  Writer notified patient that tylenol 3 was sent to pharmacy on 7/2/21 [id number] - [place], [place] - [address]    Writer also notified patient that they should avoid clonazepam with Tylenol 3.    Georgiana Jennings RN  [facility]

## 2021-07-09 LAB
MDC_IDC_EPISODE_DTM: NORMAL
MDC_IDC_EPISODE_DURATION: 12 S
MDC_IDC_EPISODE_DURATION: 6 S
MDC_IDC_EPISODE_DURATION: 7 S
MDC_IDC_EPISODE_DURATION: 7 S
MDC_IDC_EPISODE_ID: NORMAL
MDC_IDC_EPISODE_TYPE: NORMAL
MDC_IDC_LEAD_IMPLANT_DT: NORMAL
MDC_IDC_LEAD_IMPLANT_DT: NORMAL
MDC_IDC_LEAD_LOCATION: NORMAL
MDC_IDC_LEAD_LOCATION: NORMAL
MDC_IDC_LEAD_LOCATION_DETAIL_1: NORMAL
MDC_IDC_LEAD_LOCATION_DETAIL_1: NORMAL
MDC_IDC_LEAD_MFG: NORMAL
MDC_IDC_LEAD_MFG: NORMAL
MDC_IDC_LEAD_MODEL: NORMAL
MDC_IDC_LEAD_MODEL: NORMAL
MDC_IDC_LEAD_POLARITY_TYPE: NORMAL
MDC_IDC_LEAD_POLARITY_TYPE: NORMAL
MDC_IDC_LEAD_SERIAL: NORMAL
MDC_IDC_LEAD_SERIAL: NORMAL
MDC_IDC_MSMT_BATTERY_DTM: NORMAL
MDC_IDC_MSMT_BATTERY_REMAINING_LONGEVITY: 36 MO
MDC_IDC_MSMT_BATTERY_REMAINING_PERCENTAGE: 56 %
MDC_IDC_MSMT_BATTERY_STATUS: NORMAL
MDC_IDC_MSMT_LEADCHNL_RA_IMPEDANCE_VALUE: 447 OHM
MDC_IDC_MSMT_LEADCHNL_RA_PACING_THRESHOLD_AMPLITUDE: 0.4 V
MDC_IDC_MSMT_LEADCHNL_RA_PACING_THRESHOLD_PULSEWIDTH: 0.4 MS
MDC_IDC_MSMT_LEADCHNL_RV_IMPEDANCE_VALUE: 391 OHM
MDC_IDC_MSMT_LEADCHNL_RV_PACING_THRESHOLD_AMPLITUDE: 1.9 V
MDC_IDC_MSMT_LEADCHNL_RV_PACING_THRESHOLD_PULSEWIDTH: 0.4 MS
MDC_IDC_PG_IMPLANT_DTM: NORMAL
MDC_IDC_PG_MFG: NORMAL
MDC_IDC_PG_MODEL: NORMAL
MDC_IDC_PG_SERIAL: NORMAL
MDC_IDC_PG_TYPE: NORMAL
MDC_IDC_SESS_CLINIC_NAME: NORMAL
MDC_IDC_SESS_DTM: NORMAL
MDC_IDC_SESS_TYPE: NORMAL
MDC_IDC_SET_BRADY_AT_MODE_SWITCH_MODE: NORMAL
MDC_IDC_SET_BRADY_AT_MODE_SWITCH_RATE: 150 {BEATS}/MIN
MDC_IDC_SET_BRADY_LOWRATE: 60 {BEATS}/MIN
MDC_IDC_SET_BRADY_MAX_SENSOR_RATE: 130 {BEATS}/MIN
MDC_IDC_SET_BRADY_MAX_TRACKING_RATE: 130 {BEATS}/MIN
MDC_IDC_SET_BRADY_MODE: NORMAL
MDC_IDC_SET_BRADY_PAV_DELAY_HIGH: 220 MS
MDC_IDC_SET_BRADY_PAV_DELAY_LOW: 250 MS
MDC_IDC_SET_BRADY_SAV_DELAY_HIGH: 220 MS
MDC_IDC_SET_BRADY_SAV_DELAY_LOW: 250 MS
MDC_IDC_SET_LEADCHNL_RA_PACING_AMPLITUDE: 2 V
MDC_IDC_SET_LEADCHNL_RA_PACING_POLARITY: NORMAL
MDC_IDC_SET_LEADCHNL_RA_PACING_PULSEWIDTH: 0.4 MS
MDC_IDC_SET_LEADCHNL_RA_SENSING_ADAPTATION_MODE: NORMAL
MDC_IDC_SET_LEADCHNL_RA_SENSING_POLARITY: NORMAL
MDC_IDC_SET_LEADCHNL_RA_SENSING_SENSITIVITY: 0.5 MV
MDC_IDC_SET_LEADCHNL_RV_PACING_AMPLITUDE: 2.3 V
MDC_IDC_SET_LEADCHNL_RV_PACING_CAPTURE_MODE: NORMAL
MDC_IDC_SET_LEADCHNL_RV_PACING_POLARITY: NORMAL
MDC_IDC_SET_LEADCHNL_RV_PACING_PULSEWIDTH: 0.4 MS
MDC_IDC_SET_LEADCHNL_RV_SENSING_ADAPTATION_MODE: NORMAL
MDC_IDC_SET_LEADCHNL_RV_SENSING_POLARITY: NORMAL
MDC_IDC_SET_LEADCHNL_RV_SENSING_SENSITIVITY: 1 MV
MDC_IDC_SET_ZONE_DETECTION_INTERVAL: 375 MS
MDC_IDC_SET_ZONE_TYPE: NORMAL
MDC_IDC_SET_ZONE_VENDOR_TYPE: NORMAL
MDC_IDC_STAT_AT_BURDEN_PERCENT: 1 %
MDC_IDC_STAT_AT_DTM_END: NORMAL
MDC_IDC_STAT_AT_DTM_START: NORMAL
MDC_IDC_STAT_BRADY_DTM_END: NORMAL
MDC_IDC_STAT_BRADY_DTM_START: NORMAL
MDC_IDC_STAT_BRADY_RA_PERCENT_PACED: 14 %
MDC_IDC_STAT_BRADY_RV_PERCENT_PACED: 0 %
MDC_IDC_STAT_EPISODE_RECENT_COUNT: 0
MDC_IDC_STAT_EPISODE_RECENT_COUNT: 0
MDC_IDC_STAT_EPISODE_RECENT_COUNT: 15
MDC_IDC_STAT_EPISODE_RECENT_COUNT: 6
MDC_IDC_STAT_EPISODE_RECENT_COUNT_DTM_END: NORMAL
MDC_IDC_STAT_EPISODE_RECENT_COUNT_DTM_START: NORMAL
MDC_IDC_STAT_EPISODE_TYPE: NORMAL
MDC_IDC_STAT_EPISODE_VENDOR_TYPE: NORMAL

## 2021-07-13 DIAGNOSIS — G89.29 CHRONIC RIGHT SHOULDER PAIN: ICD-10-CM

## 2021-07-13 DIAGNOSIS — M25.511 ACUTE PAIN OF RIGHT SHOULDER: ICD-10-CM

## 2021-07-13 DIAGNOSIS — M25.511 CHRONIC RIGHT SHOULDER PAIN: ICD-10-CM

## 2021-07-14 ENCOUNTER — TRANSFERRED RECORDS (OUTPATIENT)
Dept: HEALTH INFORMATION MANAGEMENT | Facility: CLINIC | Age: 81
End: 2021-07-14

## 2021-07-14 DIAGNOSIS — M25.511 ACUTE PAIN OF RIGHT SHOULDER: ICD-10-CM

## 2021-07-14 NOTE — TELEPHONE ENCOUNTER
Pt asking for refill of Tylenol 3.  Last refill 12 tabs on 7/2/21  Surgery is being put off due to needing extra sleep study from pulmonology, etc.  Will be at least 30 days out.    Pt states is using sparingly, one tablet a day.   Needs this as on warfarin so cannot take nsaids.  Pended a 30 tab supply.  Told pt would ask PCP, but should continue to take sparing.      Discussed addiction risk, and that sometimes taking more of this medication can actually make overall pain worse.  Pt said he was appreciative of this info, and will continue to take sparingly.

## 2021-07-14 NOTE — TELEPHONE ENCOUNTER
acetaminophen-codeine (TYLENOL #3) 300-30 MG tablet    Summary: Take 1 tablet by mouth 2 times daily as needed for severe pain, Disp-12 tablet, R-0, E-Prescribe   Dose, Route, Frequency: 1 tablet, Oral, 2 TIMES DAILY PRN  Start: 7/2/2021  Ord/Sold: 7/2/2021

## 2021-07-18 DIAGNOSIS — I10 ESSENTIAL HYPERTENSION: ICD-10-CM

## 2021-07-20 RX ORDER — DILTIAZEM HYDROCHLORIDE 300 MG/1
CAPSULE, COATED, EXTENDED RELEASE ORAL
Qty: 90 CAPSULE | Refills: 3 | Status: SHIPPED | OUTPATIENT
Start: 2021-07-20 | End: 2022-08-17

## 2021-07-20 NOTE — TELEPHONE ENCOUNTER
Routing refill request to provider for review/approval because:  Labs out of range:    Creatinine   Date Value Ref Range Status   06/21/2021 1.57 (H) 0.66 - 1.25 mg/dL Final

## 2021-07-28 ENCOUNTER — TELEPHONE (OUTPATIENT)
Dept: FAMILY MEDICINE | Facility: CLINIC | Age: 81
End: 2021-07-28

## 2021-08-01 DIAGNOSIS — I50.32 CHRONIC DIASTOLIC (CONGESTIVE) HEART FAILURE (H): ICD-10-CM

## 2021-08-02 DIAGNOSIS — M25.511 ACUTE PAIN OF RIGHT SHOULDER: ICD-10-CM

## 2021-08-02 RX ORDER — CARVEDILOL 12.5 MG/1
TABLET ORAL
Qty: 180 TABLET | Refills: 1 | Status: SHIPPED | OUTPATIENT
Start: 2021-08-02 | End: 2022-01-21

## 2021-08-03 DIAGNOSIS — M25.511 CHRONIC RIGHT SHOULDER PAIN: Primary | ICD-10-CM

## 2021-08-03 DIAGNOSIS — G89.29 CHRONIC RIGHT SHOULDER PAIN: Primary | ICD-10-CM

## 2021-08-03 NOTE — TELEPHONE ENCOUNTER
acetaminophen-codeine (TYLENOL #3) 300-30 MG tablet 15 tablet 0 7/16/2021  No   Sig - Route: Take 1 tablet by mouth daily as needed for severe pain (use sparingly for sevrere pain only) - Oral   Sent to pharmacy as: Acetaminophen-Codeine #3 300-30 MG Oral Tablet (TYLENOL #3)   Class: E-Prescribe   Order: [id number]   E-Prescribing Status: Receipt confirmed by pharmacy (7/16/2021 12:03 AM CDT)     Last OV 6/21/21     Routing refill request to provider for review/approval because:  Drug not on the [facility] refill protocol     Keisha MARTIN RN

## 2021-08-03 NOTE — TELEPHONE ENCOUNTER
Patient called only has a few pills, requesting refill.     Callback: [phone]= okay to leave detailed VM.    Georgiana Jennings RN  [facility]

## 2021-08-04 ENCOUNTER — ANTICOAGULATION THERAPY VISIT (OUTPATIENT)
Dept: ANTICOAGULATION | Facility: CLINIC | Age: 81
End: 2021-08-04

## 2021-08-04 ENCOUNTER — LAB (OUTPATIENT)
Dept: LAB | Facility: CLINIC | Age: 81
End: 2021-08-04
Payer: MEDICARE

## 2021-08-04 DIAGNOSIS — I48.0 PAROXYSMAL A-FIB (H): ICD-10-CM

## 2021-08-04 DIAGNOSIS — I26.99 PE (PULMONARY THROMBOEMBOLISM) (H): ICD-10-CM

## 2021-08-04 DIAGNOSIS — I82.4Z9 DEEP VEIN THROMBOSIS (DVT) OF DISTAL VEIN OF LOWER EXTREMITY, UNSPECIFIED CHRONICITY, UNSPECIFIED LATERALITY (H): ICD-10-CM

## 2021-08-04 DIAGNOSIS — I26.99 PE (PULMONARY THROMBOEMBOLISM) (H): Primary | ICD-10-CM

## 2021-08-04 DIAGNOSIS — I48.0 PAROXYSMAL A-FIB (H): Primary | ICD-10-CM

## 2021-08-04 LAB — INR BLD: 2.8 (ref 0.9–1.1)

## 2021-08-04 PROCEDURE — 36416 COLLJ CAPILLARY BLOOD SPEC: CPT

## 2021-08-04 PROCEDURE — 85610 PROTHROMBIN TIME: CPT

## 2021-08-04 NOTE — PROGRESS NOTES
ANTICOAGULATION MANAGEMENT     Erich Craven 81 year old male is on warfarin with therapeutic INR result. (Goal INR 2.0-3.0)    Recent labs: (last 7 days)     08/04/21  1008   INR 2.8*       ASSESSMENT     Source(s): Chart Review and Patient/Caregiver Call       Warfarin doses taken: Warfarin taken as instructed    Diet: No new diet changes identified    New illness, injury, or hospitalization: No    Medication/supplement changes: None noted    Signs or symptoms of bleeding or clotting: No    Previous INR: Therapeutic last 2(+) visits    Additional findings: Upcoming surgery/procedure not yet scheduled, pt aware to call with date of surgery for warfarin hold plan     PLAN     Recommended plan for no diet, medication or health factor changes affecting INR     Dosing Instructions: Continue your current warfarin dose with next INR in 6 weeks       Summary  As of 8/4/2021    Full warfarin instructions:  7.5 mg every Fri; 5 mg all other days   Next INR check:  9/15/2021             Telephone call with Erich who verbalizes understanding and agrees to plan    Lab visit scheduled    Education provided: Please call back if any changes to your diet, medications or how you've been taking warfarin and Importance of notifying clinic of upcoming surgeries and procedures 2 weeks in advance    Plan made per [facility] anticoagulation protocol    Paola Bee RN  Anticoagulation Clinic  8/4/2021    _______________________________________________________________________     Anticoagulation Episode Summary     Current INR goal:  2.0-3.0   TTR:  74.8 % (1 y)   Target end date:  Indefinite   Send INR reminders to:  [facility] [place]    Indications    PE (pulmonary thromboembolism) (H) [I26.99]  Paroxysmal A-fib (H) [I48.0]  Deep vein thrombosis (DVT) of distal vein of lower extremity  unspecified chronicity  unspecified laterality (H) [I82.4Z9]           Comments:           Anticoagulation Care Providers     Provider Role Specialty  Phone number    Dontrell Gómez MD Referring Internal Medicine [phone]    Anne Heath MD Referring Internal Medicine [phone]

## 2021-08-04 NOTE — TELEPHONE ENCOUNTER
Please notify patient he will need to establish with pain management for chronic management of shoulder pain.  I will place a referral

## 2021-08-05 ENCOUNTER — VIRTUAL VISIT (OUTPATIENT)
Dept: ANESTHESIOLOGY | Facility: CLINIC | Age: 81
End: 2021-08-05
Payer: MEDICARE

## 2021-08-05 ENCOUNTER — TELEPHONE (OUTPATIENT)
Dept: ANESTHESIOLOGY | Facility: CLINIC | Age: 81
End: 2021-08-05

## 2021-08-05 ENCOUNTER — TELEPHONE (OUTPATIENT)
Dept: FAMILY MEDICINE | Facility: CLINIC | Age: 81
End: 2021-08-05

## 2021-08-05 DIAGNOSIS — Z11.59 ENCOUNTER FOR SCREENING FOR OTHER VIRAL DISEASES: ICD-10-CM

## 2021-08-05 DIAGNOSIS — M25.511 CHRONIC RIGHT SHOULDER PAIN: ICD-10-CM

## 2021-08-05 DIAGNOSIS — G89.29 CHRONIC RIGHT SHOULDER PAIN: ICD-10-CM

## 2021-08-05 PROCEDURE — 99442 PR PHYSICIAN TELEPHONE EVALUATION 11-20 MIN: CPT | Performed by: ANESTHESIOLOGY

## 2021-08-05 RX ORDER — TRAMADOL HYDROCHLORIDE 50 MG/1
25 TABLET ORAL 2 TIMES DAILY PRN
Qty: 20 TABLET | Refills: 0 | Status: SHIPPED | OUTPATIENT
Start: 2021-08-05 | End: 2021-08-25

## 2021-08-05 ASSESSMENT — PAIN SCALES - GENERAL: PAINLEVEL: MODERATE PAIN (5)

## 2021-08-05 NOTE — PATIENT INSTRUCTIONS
Medications:    traMADol (ULTRAM) 50 MG tablet. Take 0.5 tablets (25 mg) by mouth 2 times daily as needed for severe pain.       *For refills of opioid medications - You MUST call (Or MyChart) the clinic DIRECTLY and at least 7 days before you run out of your medication.    *Please provide the clinic with a minium of 1 week notice, on all prescription refills.         Procedures:    Call to schedule your procedure: [phone], option #2    BLOCK, NERVE, SUPRASCAPULAR - right under ultrasound guidance.    Your pre-procedure instructions are below, please call our clinic if you have any questions.      Recommended Follow up:      Follow up 2-3 weeks post procedure.         Please call [phone], option #1 to schedule your clinic appointment if you don't already have an appointment scheduled.      Procedure Information related to COVID-19    Please call [phone] option #2 to schedule, reschedule, or cancel your procedure appointment.   Phones are answered Monday - Friday from 08:00 - 4:30pm.  Leave a voicemail with your name, birth date, and phone number if no one is available to take your call.     You will need to be tested for COVID-19 within 4 days (96 hours) of your procedure.  You will be called to schedule your COVID test by a central scheduling team. If you have not been contacted to schedule your test within 4 days of the scheduled procedure, call [phone]    Please be aware that the turn around time for the test is approximately 24-72 hours.   If your results are still pending the day of your procedure, you will be notified as soon as possible as the procedure may be cancelled.    Please note: You will only be contacted for positive and pending results.     The procedure center staff will call you several days before the procedure to review important information that you will need to know for the day of the procedure.   Please contact the clinic if you have further questions about this  information.       Information related to Scheduling and Pre-Procedure Instructions:    Please Hold Warfarin for 5 days before your procedure.      If you are having a Diagnostic procedure, you will be given a Pain Diary to document your pain relief.   Please fax the completed form to our office.   fax number is [phone]    If you must reschedule your procedure more than two times, you must follow up in clinic before rescheduling again.        Preparing for your procedure    CAUTION - FAILURE TO FOLLOW THESE PRE-PROCEDURE INSTRUCTIONS WILL RESULT IN YOUR PROCEDURE BEING RESCHEDULED.      Your procedure: BLOCK, NERVE, SUPRASCAPULAR - right under ultrasound guidance            You must have a  take you home after your procedure. Transportation by taxi or para-transit is okay as long as you have a responsible adult accompany you. You must provide your 's full name and contact number at time of check in.     Fasting Protocol Please have nothing to eat and drink for 2 hours before the procedure.      Medications If you take any medications, DO NOT STOP. Take your medications as usual the day of your procedure with a sip of water AT LEAST 2 HOURS PRIOR TO ARRIVAL.    Antibiotics If you are currently taking antibiotics, you must complete the entire dose 7 days prior to your scheduled procedure. You must be clear of any signs or symptoms of infection. If you begin antibiotics, please contact our clinic for instructions.     Fever, Chills, or Rash If you experience a fever of higher than 100 degrees, chills, rash, or open wounds during the one week before your procedure, please call the clinic to see if you may proceed with your procedure.      Medication Hold List  **Patients under Cardiology/Neurology care should consult their provider prior to the pain procedure to verify pre-procedure medication instructions. The information below contains general guidelines.**    Please Hold Warfarin for 5 days  before your procedure.    Blood Thinners If you are taking daily ASPIRIN, PLAVIX, OR OTHER BLOOD THINNERS SUCH AS COUMADIN/WARFARIN, we will need your prescribing doctor to sign a release permitting you to stop these medications. Once approved by your prescribing doctor - STOP ALL BLOOD THINNERS BASED ON THE TIME TABLE BELOW PRIOR TO YOUR PROCEDURE. If you have been instructed to stop WARFARIN(COUMADIN), you must have an INR lab drawn the day before your procedure. . Your INR must be within normal limits before we can perform your injection. MEDICATIONS CAN BE RESTARTED AFTER YOUR PROCEDURE.    14 DAY HOLD  Ticlid (ticlopidine)    10 DAY HOLD  Effient (Prasugel)    3 DAY HOLD  Xarelto (rivaroxaban) 7 DAY HOLD  Anacin, Bufferin, Ecotrin, Excedrin, Aggrenox (Aspirin)  Brilinta (ticagrelor)  Coumadin (Warfarin)  Pradexa (Dabigatran)  Elmiron (Pentosan)  Plavix (Clopidogrel Bisulfate)  Pletal (Cilostazol)    24 HOUR HOLD  Lovenox (enoxaparin)  Agrylin (Anagrelide)        Non-steroidal Anti-inflammatories (NSAIDs) DO NOT TAKE any non-steroidal anti-inflammatory medications (NSAIDs) listed on the table below. MEDICATIONS CAN BE RESTARTED AFTER YOUR PROCEDURE. Celebrex is OK to take and does not need to be discontinued.     Medications to stop:  3 DAY HOLD  Advil, Motrin (Ibuprofen)  Arthrotec (diciofenac sodium/misoprostol)  Clinoril (Sulindac)  Indocin (Indomethacin)  Lodine (Etodolac)  Toradol (Ketorolac)  Vicoprofen (Hydrocodone and Ibuprofen)  Voltaren (Diclotenac)    14 DAY HOLD  Daypro (Oxaprozin)  Feldene (Piroxicam)   7 DAY HOLD  Aleve (Naproxen sodium)  Darvon compound (contains aspirin)  Naprosyn (Naproxen)  Norgesic Forte (contains aspirin)  Mobic (Meloxicam)  Oruvall (Ketoprofen)  Percodan (contains aspirin)  Relafen (Nabumetone)  Salsalate  Trilisate  Vitamin E (more than 400 mg per day)  Any medication containing aspirin                To speak with a nurse, schedule/reschedule/cancel a clinic appointment,  or request a medication refill call: [phone]     You can also reach us by [employer]: [email or web address]

## 2021-08-05 NOTE — TELEPHONE ENCOUNTER
Linda at the  Pain Clinic has been advised. Spoke with patient and discussed and my chart message sent with instructions.      Evita Lloyd RN  [facility]

## 2021-08-05 NOTE — PROGRESS NOTES
Jaun is a 81 year old who is being evaluated via a billable video visit.      How would you like to obtain your AVS? MyChart  If the video visit is dropped, the invitation should be resent by: Text to cell phone: [phone]  Will anyone else be joining your video visit? No        Shani Shelton LPN

## 2021-08-05 NOTE — LETTER
8/5/2021       RE: Erich Craven  [address]  [facility] [address]     Dear Colleague,    Thank you for referring your patient, Erich Craven, to the [facility] FOR [facility]IVE PAIN MANAGEMENT [place] at [facility]. Please see a copy of my visit note below.    Telephone call duration: 18 minutes      The patient is a 81-year-old male with past medical history significant for primary osteoarthritis of the both shoulders, chronic bilateral shoulder pain (right greater than the left), RIN, COPD, obesity with BMI 39, hypertension, paroxysmal A. fib status post pacemaker and DVT.  His pain located in the anterior aspect bilateral shoulder and occasionally pain radiating to bilateral upper arms and to elbows.  Pain increases with reaching overhead, reaching behind the back, putting on jacket forgetting and lifting.  He is unable to lift more than 90 degrees.  He notes that pain gradually gets worse throughout the day.  He has been taking Tylenol 3 with good pain relief.  He states that he takes only at night.  He was scheduled for the shoulder replacement surgery however it was canceled due to his COPD exacerbation and upper respiratory infection.  He is still planning on right shoulder surgery.  Date is not confirmed yet.    He came to us for better pain control.  In the past he had performed multiple shoulder injections with good pain relief.  He is interested in trying them again.  He also requests alternative to Tylenol 3.    I have reviewed his x-ray of the shoulder.  It shows severe degenerative joint disease in both shoulder.  No visible erosion of the glenoid in the right shoulder is noted.    Physical examination    General: Pleasant in conversation.  In no acute distress  Respiration: No respiratory distress noted  Musculoskeletal: Patient states that he was able to lift his both arms up to 90 degrees.  Neurologic: Alert awake  oriented  Psych: Mentation intact      Diagnosis    Severe degenerative joint disease of the both shoulder (right greater than the left)  Chronic right shoulder pain  COPD  A. fib status post pacemaker  RIN  BMI to 39  History of DVT      Plan      Trial of tramadol 25 mg twice daily.  The dose should not be increased as the patient is on sertraline, therefore he is at risk of serotonin syndrome.      Recommend right suprascapular nerve block.  Risks of the procedure including bleeding, infection, failed procedure, increased pain, pneumothorax discussed with the patient      Follow-up 2 weeks after procedure      Swetha Allen MD, PhD        Again, thank you for allowing me to participate in the care of your patient.      Sincerely,    Swetha Allen MD

## 2021-08-05 NOTE — TELEPHONE ENCOUNTER
Writer received return call from MARIA DE JESUS Jama, with Anticoagulation Clinic. VO received from Dr. Heath, patient is okay to hold his Warfarin 5 days prior to ordered procedure, no bridging needed. Anticoag and pain clinic will follow up with patient with these instructions.    Linda Serrano RN

## 2021-08-05 NOTE — NURSING NOTE
RN read through the instructions with the patient for the recommended procedure: Suprascapular Nerve Block  Patient verbalized understanding to holding appropriate medication per protocol and was agreeable to NPO policy and needing a .    Anticoagulant: Warfarin- hold 5 days prior to procedure, writer called [facility] clinic and spoke with Evita- requesting VO to hold Warfarin 5 days prior to procedure. Waiting for call back with orders.      Recommended Follow Up:  2-3 weeks after procedure    Linda Serrano RN

## 2021-08-05 NOTE — TELEPHONE ENCOUNTER
Linda from the  Pain Clinic is calling in regards to patient.  He will be having a suprascapular nerve block done and they need hold orders.  They are advising a 5 day hold and need to know if he would need bridging.  Once they have the okay to hold, then they will set up the date.  They are hoping to schedule this for the end of next week.      Will route to [facility] to advise on the hold orders.    Evita Lloyd RN  [facility]

## 2021-08-05 NOTE — TELEPHONE ENCOUNTER
[facility] ANT[facility]AGULATION  MANAGEMENT    Assessment     Warfarin interruption plan for pain injection on date TBD.     PE, Afib      Risk stratification for thromboembolism: moderate (2017 ACC periprocedure pathway for NVAF Expert Consensus)      [id number] = 5 (CHF, HTN, Age++, vasc DX/stents)     PE/DVT provoked post TKA     NVAF: 2017 ACC periprocedure pathway for NVAF advises NO bridge for moderate risk stratification ([id number]-[id number] score 5-6 or hx of stroke, TIA or systemic embolism with increased risk of bleeding      This is a low VTE risk procedure, without anticipated decreased mobility, reasonable to assess based on UMC8YH1-XCJe score alone since PE was provoked event with major surgery.    Plan       Pre-Procedure:    Hold warfarin until after procedure startin days prior        Post-Procedure:    Resume home warfarin dose if okay with provider doing procedure on night of procedure,  PM: 10mg    Recheck INR ~10 days after resuming warfarin   ?   Sherin Santillan [facility]    Subjective/Objective:      Roberto Carlos Towey, a 81 year old male    Reason for Anticoagulation: A. Fib and PE    Goal INR Range: 2.0-3.0     Patient bridged in past: Yes: with acute PE    Pertinent History: N/A    Wt Readings from Last 3 Encounters:   21 133.4 kg (294 lb)   21 133.4 kg (294 lb)   21 133.8 kg (295 lb)      Ideal body weight: 77.6 kg (171 lb 1.2 oz)  Adjusted ideal body weight: 99.9 kg (220 lb 3.9 oz)     Estimated body mass index is 39.87 kg/m  as calculated from the following:    Height as of 21: 1.829 m (6').    Weight as of 21: 133.4 kg (294 lb).    Lab Results   Component Value Date    INR 2.8 (H) 2021    INR 2.80 (H) 2021    INR 2.80 (H) 2021     Lab Results   Component Value Date    HGB 12.2 2021    HCT 36.7 2021     2021     Lab Results   Component Value Date    CR 1.57 (H) 2021    CR 1.81 (H) 2021    CR 1.02 2021      CrCl cannot be calculated (Patient's most recent lab result is older than the maximum 30 days allowed.).  Calc 51ml/min with ABW.

## 2021-08-05 NOTE — PROGRESS NOTES
Telephone call duration: 18 minutes      The patient is a 81-year-old male with past medical history significant for primary osteoarthritis of the both shoulders, chronic bilateral shoulder pain (right greater than the left), RIN, COPD, obesity with BMI 39, hypertension, paroxysmal A. fib status post pacemaker and DVT.  His pain located in the anterior aspect bilateral shoulder and occasionally pain radiating to bilateral upper arms and to elbows.  Pain increases with reaching overhead, reaching behind the back, putting on jacket forgetting and lifting.  He is unable to lift more than 90 degrees.  He notes that pain gradually gets worse throughout the day.  He has been taking Tylenol 3 with good pain relief.  He states that he takes only at night.  He was scheduled for the shoulder replacement surgery however it was canceled due to his COPD exacerbation and upper respiratory infection.  He is still planning on right shoulder surgery.  Date is not confirmed yet.    He came to us for better pain control.  In the past he had performed multiple shoulder injections with good pain relief.  He is interested in trying them again.  He also requests alternative to Tylenol 3.    I have reviewed his x-ray of the shoulder.  It shows severe degenerative joint disease in both shoulder.  No visible erosion of the glenoid in the right shoulder is noted.    Physical examination    General: Pleasant in conversation.  In no acute distress  Respiration: No respiratory distress noted  Musculoskeletal: Patient states that he was able to lift his both arms up to 90 degrees.  Neurologic: Alert awake oriented  Psych: Mentation intact      Diagnosis    Severe degenerative joint disease of the both shoulder (right greater than the left)  Chronic right shoulder pain  COPD  A. fib status post pacemaker  RIN  BMI to 39  History of DVT      Plan      Trial of tramadol 25 mg twice daily.  The dose should not be increased as the patient is on  sertraline, therefore he is at risk of serotonin syndrome.      Recommend right suprascapular nerve block.  Risks of the procedure including bleeding, infection, failed procedure, increased pain, pneumothorax discussed with the patient      Follow-up 2 weeks after procedure      Swetha Allen MD, PhD    [facility] [address] [id number] [phone]  Dept: [phone]

## 2021-08-11 DIAGNOSIS — I89.0 LYMPHEDEMA: ICD-10-CM

## 2021-08-11 DIAGNOSIS — N18.32 STAGE 3B CHRONIC KIDNEY DISEASE (H): Primary | ICD-10-CM

## 2021-08-11 NOTE — TELEPHONE ENCOUNTER
Routing refill request to provider for review/approval because:  Labs out of range:    Creatinine   Date Value Ref Range Status   06/21/2021 1.57 (H) 0.66 - 1.25 mg/dL Final     Angelica Romero RN

## 2021-08-12 RX ORDER — SPIRONOLACTONE 25 MG/1
25 TABLET ORAL EVERY OTHER DAY
Qty: 45 TABLET | Refills: 0 | Status: SHIPPED | OUTPATIENT
Start: 2021-08-12 | End: 2021-11-08

## 2021-08-21 DIAGNOSIS — M25.511 CHRONIC RIGHT SHOULDER PAIN: ICD-10-CM

## 2021-08-21 DIAGNOSIS — G89.29 CHRONIC RIGHT SHOULDER PAIN: ICD-10-CM

## 2021-08-23 ENCOUNTER — LAB (OUTPATIENT)
Dept: URGENT CARE | Facility: URGENT CARE | Age: 81
End: 2021-08-23
Payer: MEDICARE

## 2021-08-23 DIAGNOSIS — Z11.59 ENCOUNTER FOR SCREENING FOR OTHER VIRAL DISEASES: ICD-10-CM

## 2021-08-23 LAB — SARS-COV-2 RNA RESP QL NAA+PROBE: NEGATIVE

## 2021-08-23 PROCEDURE — U0005 INFEC AGEN DETEC AMPLI PROBE: HCPCS

## 2021-08-23 PROCEDURE — U0003 INFECTIOUS AGENT DETECTION BY NUCLEIC ACID (DNA OR RNA); SEVERE ACUTE RESPIRATORY SYNDROME CORONAVIRUS 2 (SARS-COV-2) (CORONAVIRUS DISEASE [COVID-19]), AMPLIFIED PROBE TECHNIQUE, MAKING USE OF HIGH THROUGHPUT TECHNOLOGIES AS DESCRIBED BY CMS-2020-01-R: HCPCS

## 2021-08-24 ENCOUNTER — TELEPHONE (OUTPATIENT)
Dept: ANESTHESIOLOGY | Facility: CLINIC | Age: 81
End: 2021-08-24

## 2021-08-24 NOTE — TELEPHONE ENCOUNTER
RN returned call to patient to discuss his message was routed to provider to review and advise. Writer informed patient we will follow up after Dr. Allen reviews. Patient verbalized understanding.     Linda Serrano RN

## 2021-08-24 NOTE — TELEPHONE ENCOUNTER
[facility] Call Center    Phone Message    May a detailed message be left on voicemail: yes     Reason for Call: Other: Patient called to request a block on his left shoulder also on 8/26/21 when he has the block on his right shoulder. Please follow up with patient to advise. Thank you!     Action Taken: Message routed to:  [facility] pain    Travel Screening: Not Applicable

## 2021-08-25 ENCOUNTER — TRANSFERRED RECORDS (OUTPATIENT)
Dept: HEALTH INFORMATION MANAGEMENT | Facility: CLINIC | Age: 81
End: 2021-08-25

## 2021-08-25 NOTE — TELEPHONE ENCOUNTER
RN called patient and left voicemail to discuss that Dr. Allen is agreeable to do bilateral suprascapular block for his scheduled procedure tomorrow. Left call back number to clinic if patient is interested in discussing.    Linda Serrano RN

## 2021-08-26 ENCOUNTER — ANCILLARY PROCEDURE (OUTPATIENT)
Dept: RADIOLOGY | Facility: AMBULATORY SURGERY CENTER | Age: 81
End: 2021-08-26
Attending: ANESTHESIOLOGY
Payer: MEDICARE

## 2021-08-26 ENCOUNTER — HOSPITAL ENCOUNTER (OUTPATIENT)
Facility: AMBULATORY SURGERY CENTER | Age: 81
Discharge: HOME | End: 2021-08-26
Attending: ANESTHESIOLOGY | Admitting: ANESTHESIOLOGY
Payer: MEDICARE

## 2021-08-26 VITALS
HEART RATE: 67 BPM | WEIGHT: 294 LBS | DIASTOLIC BLOOD PRESSURE: 74 MMHG | HEIGHT: 72 IN | OXYGEN SATURATION: 95 % | TEMPERATURE: 96.9 F | BODY MASS INDEX: 39.82 KG/M2 | SYSTOLIC BLOOD PRESSURE: 133 MMHG | RESPIRATION RATE: 17 BRPM

## 2021-08-26 DIAGNOSIS — R52 PAIN: ICD-10-CM

## 2021-08-26 DIAGNOSIS — G89.29 CHRONIC RIGHT SHOULDER PAIN: ICD-10-CM

## 2021-08-26 DIAGNOSIS — M25.511 CHRONIC RIGHT SHOULDER PAIN: ICD-10-CM

## 2021-08-26 PROCEDURE — 64418 NJX AA&/STRD SPRSCAP NRV: CPT | Mod: 50 | Performed by: ANESTHESIOLOGY

## 2021-08-26 PROCEDURE — 64418 NJX AA&/STRD SPRSCAP NRV: CPT | Mod: LT

## 2021-08-26 PROCEDURE — 76942 ECHO GUIDE FOR BIOPSY: CPT | Mod: 26 | Performed by: ANESTHESIOLOGY

## 2021-08-26 RX ORDER — TRIAMCINOLONE ACETONIDE 40 MG/ML
INJECTION, SUSPENSION INTRA-ARTICULAR; INTRAMUSCULAR PRN
Status: DISCONTINUED | OUTPATIENT
Start: 2021-08-26 | End: 2021-08-26 | Stop reason: HOSPADM

## 2021-08-26 RX ORDER — BUPIVACAINE HYDROCHLORIDE 2.5 MG/ML
INJECTION, SOLUTION EPIDURAL; INFILTRATION; INTRACAUDAL PRN
Status: DISCONTINUED | OUTPATIENT
Start: 2021-08-26 | End: 2021-08-26 | Stop reason: HOSPADM

## 2021-08-26 RX ORDER — LIDOCAINE HYDROCHLORIDE 10 MG/ML
INJECTION, SOLUTION EPIDURAL; INFILTRATION; INTRACAUDAL; PERINEURAL PRN
Status: DISCONTINUED | OUTPATIENT
Start: 2021-08-26 | End: 2021-08-26 | Stop reason: HOSPADM

## 2021-08-26 ASSESSMENT — MIFFLIN-ST. JEOR: SCORE: 2071.58

## 2021-08-26 NOTE — OP NOTE
Patient: Erich Craven Age: 81 year old   MRN: [id number] Attending: Dr. Allen     Date of Visit: August 26, 2021      PAIN MEDICINE CLINIC PROCEDURE NOTE    ATTENDING CLINICIAN:    Swetha Allen MD    ASSISTANT CLINICIAN:  Darci Londono MD, JOHNNA    PREPROCEDURE DIAGNOSES:  1.  Bilateral shoulder pain  2.  Bilateral rotator cuff tear     PROCEDURE(S) PERFORMED:  1.  Bilateral suprascapular nerve blocks  2.  Ultrasound guidance for the above-named procedure(s)      ANESTHESIA:  Local.    BLOOD LOSS:  Minimal.    DRAINS AND SPECIMENS:  None.    COMPLICATIONS:  None.    INDICATIONS:  Erich Craven is a 81 year old male with a history of  chronic shoulder pain secondary to rotator cuff tear.  The patient stated that the patient was in their usual state of health and denied recent anticoagulant use or recent infections.  Therefore, the plan is ti perform above mentioned procedure.     Procedure Details:    The patient was met in the procedure room, where the patient was identified by name, medical record number and date of birth.  All of the patient s last minute questions were answered. Written informed consent was obtained and saved in the electronic medical record, after the risks, benefits, and alternatives were discussed with the patient.      A formal time-out procedure was performed, as per protocol, including patient name, title of procedure, and site of procedure, and all in the room concurred.  Routine monitors were applied.      The patient was placed in the sitting position on the procedure room table.Routine monitors were placed.  Vital signs were stable.    A chlorhexidine prep was completed followed by sterile draping per standard procedure.  Patient's consent was obtained.  The right side Suprascapular area was prepped using Chloraprep and the area was sterilely draped.  The ultrasound probe was draped and the U/S was used to identify and confirm the depth.  Fascial planes were easily visible as well as  subcutaneous tissue and the supraspinatus fossae with the scapula as the deep border.  A 21G 100 mm  pajunk needle was used in an in-plane fashion and positioned deep to the hyperechoic suprascapular ligament taking care to avoid the visibly pulsatile suprascapular artery.   ~1ml of the steroid local anesthetic was injected to confirm correct placement of the needle tip prior to injection of the remaining 5 mls of 0.25% bupivacaine 4.5 ml and 20 mg( 0.5 ml) of triamcinolone  into the area, with several aspirations being negative for blood.  A screen shot,was taken of nicely extravasating fluid into the the expected location of the supraspinatus nerve.  The needle was removed, and a band-aid was applied.    Exact same procedure performed on the left side.    Light pressure was held at the puncture site(s) to prevent ecchymosis and oozing.  The patient's skin was cleansed, and hemostasis was confirmed.  Band-aids were applied to the needle injection site(s).      Condition:    The patient remained awake and alert throughout the procedure.  The patient tolerated the procedure well and was monitored for approximately 15 minutes afterward in the post procedure area.  There were no immediate post procedure complications noted.  The patient was then discharged to home as per protocol.      Pre-procedure pain score: 6/10  Post-procedure pain score: 1/10

## 2021-08-26 NOTE — DISCHARGE INSTRUCTIONS
PAIN INJECTION HOME CARE INSTRUCTIONS  Activity  -You may resume most normal activity levels with the exception of strenuous activity. It may help to move in ways that hurt before the injection, to see if the pain is still there, but do not overdo it. Take it easy for the rest of the day.    -DO NOT remove bandaid for 24 hours  -DO NOT shower for 24 hours      Pain  -You may feel immediate pain relief and numbness for a period of time after the injection. This may indicate the medication has reached the right spot.  -Your pain may return after this short pain-free period, or may even be a little worse for a day or two. It may be caused by needle irritation or by the medication itself. The medications usually take two or three days to start working, but can take as long as a week.    -You may use an ice pack for 20 minutes every 2 hours for the first 24 hours  -You may use a heating pad after the first 24 hours  -You may use Tylenol (acetaminophen) every 4 hours or other pain medicines as directed by your physician    DID YOU RECEIVE STEROIDS TODAY?  Yes    Common side effects of steroids:  Not everyone will experience corticosteroid side effects. If side effects are experienced, they will gradually subside in the 7-10 day period following an injection. Most common side effects include:  -Flushed face and/or chest  -Feeling of warmth, particularly in the face but could be an overall feeling of warmth  -Increased blood sugar in diabetic patients  -Menstrual irregularities my occur. If taking hormone-based birth control an alternate method of birth control is recommended  -Sleep disturbances and/or mood swings are possible  -Leg cramps    PLEASE KEEP TRACK OF YOUR SYMPTOMS AND NOTE ANY CHANGES FOR YOUR DOCTOR.       Please contact us if you have:  -Severe pain  -Fever more than 101.5 degrees Fahrenheit  -Signs of infection at the injection site (redness, swelling, or drainage)    If you have questions, please contact  the Pain Clinic at [phone] Option #1 between the hours of 7:00 am and 3:00 pm Monday through Friday. After office hours you can contact the on call provider by dialing [phone]. If you need immediate attention, we recommend that you go to a hospital emergency room or dial [phone].    For patients seen by the PM and R service, please call [phone].    If you have procedure scheduling questions please call [phone] Option #2

## 2021-08-27 DIAGNOSIS — E03.9 HYPOTHYROIDISM, UNSPECIFIED TYPE: ICD-10-CM

## 2021-08-29 ENCOUNTER — MYC MEDICAL ADVICE (OUTPATIENT)
Dept: FAMILY MEDICINE | Facility: CLINIC | Age: 81
End: 2021-08-29

## 2021-08-30 RX ORDER — LEVOTHYROXINE SODIUM 175 UG/1
175 TABLET ORAL DAILY
Qty: 90 TABLET | Refills: 2 | Status: SHIPPED | OUTPATIENT
Start: 2021-08-30 | End: 2022-05-27

## 2021-08-31 ENCOUNTER — TELEPHONE (OUTPATIENT)
Dept: ANESTHESIOLOGY | Facility: CLINIC | Age: 81
End: 2021-08-31

## 2021-08-31 ENCOUNTER — LAB (OUTPATIENT)
Dept: LAB | Facility: CLINIC | Age: 81
End: 2021-08-31
Payer: MEDICARE

## 2021-08-31 ENCOUNTER — ANTICOAGULATION THERAPY VISIT (OUTPATIENT)
Dept: ANTICOAGULATION | Facility: CLINIC | Age: 81
End: 2021-08-31

## 2021-08-31 DIAGNOSIS — I48.0 PAROXYSMAL A-FIB (H): ICD-10-CM

## 2021-08-31 DIAGNOSIS — I26.99 PE (PULMONARY THROMBOEMBOLISM) (H): Primary | ICD-10-CM

## 2021-08-31 DIAGNOSIS — I82.4Z9 DEEP VEIN THROMBOSIS (DVT) OF DISTAL VEIN OF LOWER EXTREMITY, UNSPECIFIED CHRONICITY, UNSPECIFIED LATERALITY (H): ICD-10-CM

## 2021-08-31 DIAGNOSIS — I26.99 PE (PULMONARY THROMBOEMBOLISM) (H): ICD-10-CM

## 2021-08-31 DIAGNOSIS — N18.32 STAGE 3B CHRONIC KIDNEY DISEASE (H): ICD-10-CM

## 2021-08-31 LAB — INR BLD: 2.5 (ref 0.9–1.1)

## 2021-08-31 PROCEDURE — 36416 COLLJ CAPILLARY BLOOD SPEC: CPT

## 2021-08-31 PROCEDURE — 99207 PR NO CHARGE NURSE ONLY: CPT

## 2021-08-31 PROCEDURE — 85610 PROTHROMBIN TIME: CPT

## 2021-08-31 PROCEDURE — 80048 BASIC METABOLIC PNL TOTAL CA: CPT

## 2021-08-31 PROCEDURE — 36415 COLL VENOUS BLD VENIPUNCTURE: CPT

## 2021-08-31 NOTE — TELEPHONE ENCOUNTER
Refill request    Medication: Tramadol 50 mg      MNPMP Checked: Yes    Last refilled 8/5/21 for 20 tablets      Last clinic appointment: 8/5/21 Virtual Visit- Dr. Allen.  Next clinic appointment: 9/10/21, Virtual visit- VAMSI Zuluaga CNP    Last Drug Screen Collected:  None on file.   Controlled Substance Agreement signed:    None on file.     Preferred pharmacy:    [facility] [address] [place] [address]    Encounter routed to the provider on 9/3/21.    Shani Shelton LPN

## 2021-08-31 NOTE — PROGRESS NOTES
ANTICOAGULATION FOLLOW-UP CLINIC VISIT    Patient Name:  Erich Craven  Date:  2021  Contact Type:  Telephone    SUBJECTIVE:         OBJECTIVE    Recent labs: (last 7 days)     21  1402   INR 2.5*       ASSESSMENT / PLAN  INR assessment THER    Recheck INR In: 3 WEEKS    INR Location Clinic      Anticoagulation Summary  As of 2021    INR goal:  2.0-3.0   TTR:  74.8 % (1 y)   INR used for dosin.5 (2021)   Warfarin maintenance plan:  7.5 mg (5 mg x 1.5) every Fri; 5 mg (5 mg x 1) all other days   Full warfarin instructions:  7.5 mg every Fri; 5 mg all other days   Weekly warfarin total:  37.5 mg   Plan last modified:  Evita Lloyd RN (2021)   Next INR check:  2021   Priority:  Maintenance   Target end date:  Indefinite    Indications    PE (pulmonary thromboembolism) (H) [I26.99]  Paroxysmal A-fib (H) [I48.0]  Deep vein thrombosis (DVT) of distal vein of lower extremity  unspecified chronicity  unspecified laterality (H) [I82.4Z9]             Anticoagulation Episode Summary     INR check location:      Preferred lab:      Send INR reminders to:  SERENITY BECERRA    Comments:        Anticoagulation Care Providers     Provider Role Specialty Phone number    Dontrell Gómez MD Referring Internal Medicine [phone]    Anne Heath MD Referring Internal Medicine [phone]            See the Encounter Report to view Anticoagulation Flowsheet and Dosing Calendar (Go to Encounters tab in chart review, and find the Anticoagulation Therapy Visit)        Peggy Ricci RN

## 2021-08-31 NOTE — PROGRESS NOTES
ANTICOAGULATION MANAGEMENT     Erich Craven 81 year old male is on warfarin with therapeutic INR result. (Goal INR 2.0-3.0)    Recent labs: (last 7 days)     08/31/21  1402   INR 2.5*       ASSESSMENT     Source(s): Chart Review and Patient/Caregiver Call       Warfarin doses taken: Warfarin taken as instructed    Diet: No new diet changes identified    New illness, injury, or hospitalization: No    Medication/supplement changes: None noted    Signs or symptoms of bleeding or clotting: No    Previous INR: Therapeutic last 2(+) visits    Additional findings: had steroid injection on 8/26. with a 5 day hold before. Restarted 8/26 Took extra warfarin (7.5 mg) 8/26     PLAN     Recommended plan for no diet, medication or health factor changes affecting INR     Dosing Instructions: Continue your current warfarin dose with next INR in 3 weeks       Summary  As of 8/31/2021    Full warfarin instructions:  7.5 mg every Fri; 5 mg all other days   Next INR check:  9/28/2021             Telephone call with Erich who verbalizes understanding and agrees to plan    Lab visit scheduled    Education provided: Please call back if any changes to your diet, medications or how you've been taking warfarin    Plan made per [facility] anticoagulation protocol    Peggy Ricci RN  Anticoagulation Clinic  8/31/2021    _______________________________________________________________________     Anticoagulation Episode Summary     Current INR goal:  2.0-3.0   TTR:  74.8 % (1 y)   Target end date:  Indefinite   Send INR reminders to:  SERENITY BECERRA    Indications    PE (pulmonary thromboembolism) (H) [I26.99]  Paroxysmal A-fib (H) [I48.0]  Deep vein thrombosis (DVT) of distal vein of lower extremity  unspecified chronicity  unspecified laterality (H) [I82.4Z9]           Comments:           Anticoagulation Care Providers     Provider Role Specialty Phone number    Dontrell Gómez MD Referring Internal Medicine [phone]    Anne Heath  MD [facility] Internal Medicine [phone]

## 2021-09-01 LAB
ANION GAP SERPL CALCULATED.3IONS-SCNC: 6 MMOL/L (ref 3–14)
BUN SERPL-MCNC: 45 MG/DL (ref 7–30)
CALCIUM SERPL-MCNC: 9 MG/DL (ref 8.5–10.1)
CHLORIDE BLD-SCNC: 103 MMOL/L (ref 94–109)
CO2 SERPL-SCNC: 28 MMOL/L (ref 20–32)
CREAT SERPL-MCNC: 1.07 MG/DL (ref 0.66–1.25)
GFR SERPL CREATININE-BSD FRML MDRD: 64 ML/MIN/1.73M2
GLUCOSE BLD-MCNC: 127 MG/DL (ref 70–99)
POTASSIUM BLD-SCNC: 4.2 MMOL/L (ref 3.4–5.3)
SODIUM SERPL-SCNC: 137 MMOL/L (ref 133–144)

## 2021-09-02 DIAGNOSIS — M25.511 CHRONIC RIGHT SHOULDER PAIN: Primary | ICD-10-CM

## 2021-09-02 DIAGNOSIS — G89.29 CHRONIC RIGHT SHOULDER PAIN: Primary | ICD-10-CM

## 2021-09-02 DIAGNOSIS — M25.511 ACUTE PAIN OF RIGHT SHOULDER: ICD-10-CM

## 2021-09-09 NOTE — PROGRESS NOTES
Jaun is a 81 year old who is being evaluated via a billable video visit.      How would you like to obtain your AVS? MyChart  If the video visit is dropped, the invitation should be resent by: Text to cell phone: [phone]  Will anyone else be joining your video visit? No      Video Start Time: 2:34 PM    [facility]     Date of visit: 9/9/2021      Assessment:   The patient is a 81-year-old male with past medical history significant for primary osteoarthritis of the both shoulders, chronic bilateral shoulder pain (right greater than the left), RIN, COPD, obesity with BMI 39, hypertension, paroxysmal A. fib status post pacemaker and DVT.       Visit Diagnoses:  1. Chronic pain of both shoulders    2. Primary osteoarthritis of both shoulders        Plan:    Therapies:   1. Physical Therapy: continue regular physical activity as able.   2. Clinical Health Psychologist to address issues of relaxation, behavioral change, coping style, and other factors important to improvement: not at this time  Medication Management:   1. Jaun reports very good pain benefit with Tramadol, was more active, and had minimal side effects with this medication. He also agrees to discontinue clonazepam as he doesn't feel it is necessary. Okay to restart Tramadol 25mg BID as needed for severe pain. Nursing will contact him to set up an appointment to complete requirements for ongoing prescribing.   2. As discussed, there is a dangerous interaction between clonazepam and Tramadol, especially with a hx of COPD. To minimize the risk, I recommended discontinuing this medication and he is in agreement.  Further procedures recommended:   3. It might be reasonable to repeat suprascapular nerve blocks but will discuss first with Dr. Allen. If he agrees, we will order.     Follow up: CHELITA.       Krupa AGUSTIN [facility]     -------------------------------------------------    Subjective:    Chief  complaint:   Chief Complaint   Patient presents with     follow up       Interval history:  Erich Craven is a 81 year old male last seen on 8/5/2021.  He is a patient of Dr. Allen seen in follow up.     Recommendations/plan at the last visit included:    Trial of tramadol 25 mg twice daily.  The dose should not be increased as the patient is on sertraline, therefore he is at risk of serotonin syndrome.       Recommend right suprascapular nerve block.  Risks of the procedure including bleeding, infection, failed procedure, increased pain, pneumothorax discussed with the patient       Follow-up 2 weeks after procedure    Since his last visit, Erich Craven reports:  -His pain is better than it was at last visit.   -He had bilateral suprascapular nerve blocks with Dr. Allen on 8/26/2021. He reports very good pain benefit with the procedure but notes that pain is already returning. His shoulders are equally bothersome.   -He was recently started on bipap by his pulmonologist. He is still in the process of trying to have shoulder replacements. He was recently told he needs at least 60 days before considering if he could be a surgical candidate.   -He took Tramadol 25mg BID for 10 days with good pain benefit and notes he tolerated this better.     HPI per Dr. Allen:  The patient is a 81-year-old male with past medical history significant for primary osteoarthritis of the both shoulders, chronic bilateral shoulder pain (right greater than the left), RIN, COPD, obesity with BMI 39, hypertension, paroxysmal A. fib status post pacemaker and DVT.  His pain located in the anterior aspect bilateral shoulder and occasionally pain radiating to bilateral upper arms and to elbows.  Pain increases with reaching overhead, reaching behind the back, putting on jacket forgetting and lifting.  He is unable to lift more than 90 degrees.  He notes that pain gradually gets worse throughout the day.  He has been taking Tylenol 3 with  good pain relief.  He states that he takes only at night.  He was scheduled for the shoulder replacement surgery however it was canceled due to his COPD exacerbation and upper respiratory infection.  He is still planning on right shoulder surgery.  Date is not confirmed yet.     He came to us for better pain control.  In the past he had performed multiple shoulder injections with good pain relief.  He is interested in trying them again.  He also requests alternative to Tylenol 3.     I have reviewed his x-ray of the shoulder.  It shows severe degenerative joint disease in both shoulder.  No visible erosion of the glenoid in the right shoulder is noted.       Pain Information:   Pain rating: averages 5/10 on a 0-10 scale.     Current pain medications:    Tylenol 1000mg QID- somewhat helpful, not as good as Tramadol    Tramadol 25mg BID- was helpful, ran out after taking for 10 days   Zoloft 50mg daily   Clonazepam 0.5mg daily prn- somewhat helpful, takes at bedtime, is open to discontinuing    Current MME: 0    THE 4 A's OF OPIOID MAINTENANCE ANALGESIA    Analgesia: very good at low dose    Activity: improved    Adverse effects: denies    Adherence to Rx protocol: no concerns    [place] Board of Pharmacy Data Base Reviewed:    YES;     Review of [place] Prescription Monitoring Program (): No concern for abuse or misuse of controlled medications based on this report.     Annual Controlled Substance Agreement/UDS due date: NA    Medications:  Current Outpatient Medications   Medication Sig Dispense Refill     tamsulosin (FLOMAX) 0.4 MG capsule Take 1 capsule (0.4 mg) by mouth daily 90 capsule 0     traMADol (ULTRAM) 50 MG tablet Take 0.5 tablets (25 mg) by mouth 2 times daily as needed for severe pain 60 tablet 0     acetaminophen (TYLENOL) 500 MG tablet Take 1,000 mg by mouth 4 times daily        Acetylcarnitine HCl (ACETYL L-CARNITINE) 500 MG CAPS Take 1 capsule by mouth 2 times daily       albuterol (PROAIR  HFA/PROVENTIL HFA/VENTOLIN HFA) 108 (90 Base) MCG/ACT inhaler Inhale 2 puffs into the lungs every 4 hours as needed for shortness of breath / dyspnea or wheezing 1 Inhaler 0     aspirin (ASA) 81 MG chewable tablet Take 1 tablet by mouth daily       atorvastatin (LIPITOR) 40 MG tablet TAKE 1 TABLET BY MOUTH EVERY EVENING 90 tablet 2     carvedilol (COREG) 12.5 MG tablet TAKE 1 TABLET BY MOUTH TWICE A  tablet 1     co-enzyme Q-10 100 MG CAPS capsule Take 100 mg by mouth daily       diclofenac (VOLTAREN) 1 % topical gel Apply 1 g topically 2 times daily as needed for moderate pain 50 g 0     diltiazem ER COATED BEADS (CARDIZEM CD/CARTIA XT) 300 MG 24 hr capsule TAKE 1 CAPSULE BY MOUTH EVERY DAY 90 capsule 3     Fluticasone-Umeclidin-Vilanterol (TRELEGY ELLIPTA) 200-62.5-25 MCG/INH oral inhaler Inhale 1 puff into the lungs daily       furosemide (LASIX) 20 MG tablet Take 2 tablets (40 mg) by mouth 2 times daily 360 tablet 3     isosorbide mononitrate (IMDUR) 30 MG 24 hr tablet TAKE 1 TABLET BY MOUTH EVERY DAY 90 tablet 3     levothyroxine (SYNTHROID/LEVOTHROID) 175 MCG tablet Take 1 tablet (175 mcg) by mouth daily Additional 1/2 tablet on Sundays 90 tablet 2     lidocaine (LMX4) 4 % external cream Apply topically as needed for mild pain       lisinopril (ZESTRIL) 40 MG tablet Take 1 tablet (40 mg) by mouth daily 90 tablet 3     metolazone (ZAROXOLYN) 2.5 MG tablet Take 1 tablet (2.5 mg) by mouth 2 times daily 180 tablet 3     Multiple vitamin TABS Take 1 tablet by mouth daily       order for DME Oxygen 2 Li/min  Continuous. At night bled into PAP device. In the daytime, NC with bubbler: requiring portability 1 Device 0     sertraline (ZOLOFT) 25 MG tablet Take 2 tablets (50 mg) by mouth daily 180 tablet 1     spironolactone (ALDACTONE) 25 MG tablet TAKE 1 TABLET (25 MG) BY MOUTH EVERY OTHER DAY 45 tablet 0     Taurine 500 MG CAPS Take 500 mg by mouth 2 times daily       warfarin ANTICOAGULANT (COUMADIN) 5 MG  tablet Take 1.5 tablets (7.5 mg) on Fridays and take 1 tablet (5 mg) all other days or as directed by the INR clinic 100 tablet 0       Medical History: any changes in medical history since they were last seen? No    Review of Systems: A 10-point review of systems was negative, with the exception of chronic pain issues and SOB.     Objective:    Physical Exam:  There were no vitals taken for this visit.  Constitutional: Well developed, well nourished, appears stated age. Obese.  HEENT: Head atraumatic, normocephalic. Eyes without conjunctival injection or jaundice. Oropharynx clear. Neck supple. No obvious neck masses.  Skin: No rash, lesions, or petechiae of exposed skin.   Psychiatric/mental status: Alert, without lethargy or stupor. Speech fluent. Appropriate affect. Mood normal. Able to follow commands without difficulty.     Imaging:  X-ray of bilateral shoulders was completed on 2/26/2021 and shows:  Impression: Bilateral shoulder x-rays demonstrate severe degenerative joint disease of both shoulders.  Noticeable erosion of the glenoid in the right shoulder is noted.  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  No significant proximal migration of the humeral head is noted bilaterally.  Otherwise, no acute fractures or other osseous pathology noted.    Video-Visit Details    Type of service:  Video Visit    Video End Time:2:50 PM    Originating Location (pt. Location): Home    Distant Location (provider location):  [facility]     Platform used for Video Visit: TUTORize    BILLING TIME DOCUMENTATION:   The total TIME spent on this patient on the date of the encounter/appointment was 26 minutes.      TOTAL TIME includes:   Time spent preparing to see the patient (reviewing records and tests)   Time spent face to face (or over the phone) with the patient   Time spent ordering tests, medications, procedures and referrals   Time spent Referring and  communicating with other healthcare professionals   Time spent documenting clinical information in Epic

## 2021-09-10 ENCOUNTER — TELEPHONE (OUTPATIENT)
Dept: ANESTHESIOLOGY | Facility: CLINIC | Age: 81
End: 2021-09-10

## 2021-09-10 ENCOUNTER — VIRTUAL VISIT (OUTPATIENT)
Dept: ANESTHESIOLOGY | Facility: CLINIC | Age: 81
End: 2021-09-10
Payer: MEDICARE

## 2021-09-10 DIAGNOSIS — G89.29 CHRONIC PAIN OF BOTH SHOULDERS: Primary | ICD-10-CM

## 2021-09-10 DIAGNOSIS — M25.511 CHRONIC PAIN OF BOTH SHOULDERS: Primary | ICD-10-CM

## 2021-09-10 DIAGNOSIS — M19.012 PRIMARY OSTEOARTHRITIS OF BOTH SHOULDERS: ICD-10-CM

## 2021-09-10 DIAGNOSIS — M25.512 CHRONIC PAIN OF BOTH SHOULDERS: Primary | ICD-10-CM

## 2021-09-10 DIAGNOSIS — M19.011 PRIMARY OSTEOARTHRITIS OF BOTH SHOULDERS: ICD-10-CM

## 2021-09-10 PROCEDURE — 99213 OFFICE O/P EST LOW 20 MIN: CPT | Mod: 95 | Performed by: NURSE PRACTITIONER

## 2021-09-10 RX ORDER — TRAMADOL HYDROCHLORIDE 50 MG/1
25 TABLET ORAL 2 TIMES DAILY PRN
Qty: 60 TABLET | Refills: 0 | Status: SHIPPED | OUTPATIENT
Start: 2021-09-10 | End: 2021-09-13

## 2021-09-10 NOTE — TELEPHONE ENCOUNTER
[facility]    Phone Message    May a detailed message be left on voicemail: yes     Reason for Call: Medication Question or concern regarding medication   Prescription Clarification  Name of Medication: Tramadol   Prescribing Provider: Sarah Duarte   Pharmacy: [facility] [id number] [address]   What on the order needs clarification? Pharmacy called patient stating his insurance is not covering medication prescribed. Patient is requesting an alternative. Please call to discuss thank you.           Action Taken: Message routed to:  [facility] ([facility] pain     Travel Screening: Not Applicable

## 2021-09-10 NOTE — TELEPHONE ENCOUNTER
Please contact Jaun and set up an appointment for a nurse visit sometime in the next few weeks to complete a UDS and CSA. He did inquire about doing this in [place], not sure if that would be possible.    VAMSI Zuluaga [facility] Management

## 2021-09-10 NOTE — LETTER
9/10/2021       RE: Erich Craven  [address]  [facility] [address]     Dear Colleague,    Thank you for referring your patient, Erich Craven, to the [facility] FOR COM[facility]HENSIVE PAIN MANAGEMENT [place] at [facility]. Please see a copy of my visit note below.    [facility] Pain Management   Date of visit: 9/9/2021    Assessment:   The patient is a 81-year-old male with past medical history significant for primary osteoarthritis of the both shoulders, chronic bilateral shoulder pain (right greater than the left), RIN, COPD, obesity with BMI 39, hypertension, paroxysmal A. fib status post pacemaker and DVT.     Visit Diagnoses:  1. Chronic pain of both shoulders    2. Primary osteoarthritis of both shoulders        Plan:    Therapies:   1. Physical Therapy: continue regular physical activity as able.   2. Clinical Health Psychologist to address issues of relaxation, behavioral change, coping style, and other factors important to improvement: not at this time  Medication Management:   1. Jaun reports very good pain benefit with Tramadol, was more active, and had minimal side effects with this medication. He also agrees to discontinue clonazepam as he doesn't feel it is necessary. Okay to restart Tramadol 25mg BID as needed for severe pain. Nursing will contact him to set up an appointment to complete requirements for ongoing prescribing.   2. As discussed, there is a dangerous interaction between clonazepam and Tramadol, especially with a hx of COPD. To minimize the risk, I recommended discontinuing this medication and he is in agreement.  Further procedures recommended:   3. It might be reasonable to repeat suprascapular nerve blocks but will discuss first with Dr. Allen. If he agrees, we will order.     Follow up: CHELITA.       Krupa AGUSTIN CNP  [facility] Pain Management      -------------------------------------------------    Subjective:    Chief complaint:   Chief Complaint   Patient presents with     follow up       Interval history:  Erich Craven is a 81 year old male last seen on 8/5/2021.  He is a patient of Dr. Allen seen in follow up.     Recommendations/plan at the last visit included:    Trial of tramadol 25 mg twice daily.  The dose should not be increased as the patient is on sertraline, therefore he is at risk of serotonin syndrome.       Recommend right suprascapular nerve block.  Risks of the procedure including bleeding, infection, failed procedure, increased pain, pneumothorax discussed with the patient       Follow-up 2 weeks after procedure    Since his last visit, Erich Craven reports:  -His pain is better than it was at last visit.   -He had bilateral suprascapular nerve blocks with Dr. Allen on 8/26/2021. He reports very good pain benefit with the procedure but notes that pain is already returning. His shoulders are equally bothersome.   -He was recently started on bipap by his pulmonologist. He is still in the process of trying to have shoulder replacements. He was recently told he needs at least 60 days before considering if he could be a surgical candidate.   -He took Tramadol 25mg BID for 10 days with good pain benefit and notes he tolerated this better.     HPI per Dr. Allen:  The patient is a 81-year-old male with past medical history significant for primary osteoarthritis of the both shoulders, chronic bilateral shoulder pain (right greater than the left), RIN, COPD, obesity with BMI 39, hypertension, paroxysmal A. fib status post pacemaker and DVT.  His pain located in the anterior aspect bilateral shoulder and occasionally pain radiating to bilateral upper arms and to elbows.  Pain increases with reaching overhead, reaching behind the back, putting on jacket forgetting and lifting.  He is unable to lift more than 90 degrees.  He notes that pain  gradually gets worse throughout the day.  He has been taking Tylenol 3 with good pain relief.  He states that he takes only at night.  He was scheduled for the shoulder replacement surgery however it was canceled due to his COPD exacerbation and upper respiratory infection.  He is still planning on right shoulder surgery.  Date is not confirmed yet.     He came to us for better pain control.  In the past he had performed multiple shoulder injections with good pain relief.  He is interested in trying them again.  He also requests alternative to Tylenol 3.     I have reviewed his x-ray of the shoulder.  It shows severe degenerative joint disease in both shoulder.  No visible erosion of the glenoid in the right shoulder is noted.       Pain Information:   Pain rating: averages 5/10 on a 0-10 scale.     Current pain medications:    Tylenol 1000mg QID- somewhat helpful, not as good as Tramadol    Tramadol 25mg BID- was helpful, ran out after taking for 10 days   Zoloft 50mg daily   Clonazepam 0.5mg daily prn- somewhat helpful, takes at bedtime, is open to discontinuing    Current MME: 0    THE 4 A's OF OPIOID MAINTENANCE ANALGESIA    Analgesia: very good at low dose    Activity: improved    Adverse effects: denies    Adherence to Rx protocol: no concerns    [place] Board of Pharmacy Data Base Reviewed:    YES;     Review of [place] Prescription Monitoring Program (): No concern for abuse or misuse of controlled medications based on this report.     Annual Controlled Substance Agreement/UDS due date: NA    Medications:  Current Outpatient Medications   Medication Sig Dispense Refill     tamsulosin (FLOMAX) 0.4 MG capsule Take 1 capsule (0.4 mg) by mouth daily 90 capsule 0     traMADol (ULTRAM) 50 MG tablet Take 0.5 tablets (25 mg) by mouth 2 times daily as needed for severe pain 60 tablet 0     acetaminophen (TYLENOL) 500 MG tablet Take 1,000 mg by mouth 4 times daily        Acetylcarnitine HCl (ACETYL L-CARNITINE)  500 MG CAPS Take 1 capsule by mouth 2 times daily       albuterol (PROAIR HFA/PROVENTIL HFA/VENTOLIN HFA) 108 (90 Base) MCG/ACT inhaler Inhale 2 puffs into the lungs every 4 hours as needed for shortness of breath / dyspnea or wheezing 1 Inhaler 0     aspirin (ASA) 81 MG chewable tablet Take 1 tablet by mouth daily       atorvastatin (LIPITOR) 40 MG tablet TAKE 1 TABLET BY MOUTH EVERY EVENING 90 tablet 2     carvedilol (COREG) 12.5 MG tablet TAKE 1 TABLET BY MOUTH TWICE A  tablet 1     co-enzyme Q-10 100 MG CAPS capsule Take 100 mg by mouth daily       diclofenac (VOLTAREN) 1 % topical gel Apply 1 g topically 2 times daily as needed for moderate pain 50 g 0     diltiazem ER COATED BEADS (CARDIZEM CD/CARTIA XT) 300 MG 24 hr capsule TAKE 1 CAPSULE BY MOUTH EVERY DAY 90 capsule 3     Fluticasone-Umeclidin-Vilanterol (TRELEGY ELLIPTA) 200-62.5-25 MCG/INH oral inhaler Inhale 1 puff into the lungs daily       furosemide (LASIX) 20 MG tablet Take 2 tablets (40 mg) by mouth 2 times daily 360 tablet 3     isosorbide mononitrate (IMDUR) 30 MG 24 hr tablet TAKE 1 TABLET BY MOUTH EVERY DAY 90 tablet 3     levothyroxine (SYNTHROID/LEVOTHROID) 175 MCG tablet Take 1 tablet (175 mcg) by mouth daily Additional 1/2 tablet on Sundays 90 tablet 2     lidocaine (LMX4) 4 % external cream Apply topically as needed for mild pain       lisinopril (ZESTRIL) 40 MG tablet Take 1 tablet (40 mg) by mouth daily 90 tablet 3     metolazone (ZAROXOLYN) 2.5 MG tablet Take 1 tablet (2.5 mg) by mouth 2 times daily 180 tablet 3     Multiple vitamin TABS Take 1 tablet by mouth daily       order for DME Oxygen 2 Li/min  Continuous. At night bled into PAP device. In the daytime, NC with bubbler: requiring portability 1 Device 0     sertraline (ZOLOFT) 25 MG tablet Take 2 tablets (50 mg) by mouth daily 180 tablet 1     spironolactone (ALDACTONE) 25 MG tablet TAKE 1 TABLET (25 MG) BY MOUTH EVERY OTHER DAY 45 tablet 0     Taurine 500 MG CAPS Take 500  mg by mouth 2 times daily       warfarin ANTICOAGULANT (COUMADIN) 5 MG tablet Take 1.5 tablets (7.5 mg) on Fridays and take 1 tablet (5 mg) all other days or as directed by the INR clinic 100 tablet 0       Medical History: any changes in medical history since they were last seen? No    Review of Systems: A 10-point review of systems was negative, with the exception of chronic pain issues and SOB.     Objective:    Physical Exam:  There were no vitals taken for this visit.  Constitutional: Well developed, well nourished, appears stated age. Obese.  HEENT: Head atraumatic, normocephalic. Eyes without conjunctival injection or jaundice. Oropharynx clear. Neck supple. No obvious neck masses.  Skin: No rash, lesions, or petechiae of exposed skin.   Psychiatric/mental status: Alert, without lethargy or stupor. Speech fluent. Appropriate affect. Mood normal. Able to follow commands without difficulty.     Imaging:  X-ray of bilateral shoulders was completed on 2/26/2021 and shows:  Impression: Bilateral shoulder x-rays demonstrate severe degenerative joint disease of both shoulders.  Noticeable erosion of the glenoid in the right shoulder is noted.  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  No significant proximal migration of the humeral head is noted bilaterally.  Otherwise, no acute fractures or other osseous pathology noted.    BILLING TIME DOCUMENTATION:   The total TIME spent on this patient on the date of the encounter/appointment was 26 minutes.      TOTAL TIME includes:   Time spent preparing to see the patient (reviewing records and tests)   Time spent face to face (or over the phone) with the patient   Time spent ordering tests, medications, procedures and referrals   Time spent Referring and communicating with other healthcare professionals   Time spent documenting clinical information in Epic               Again, thank you for allowing me to participate in the care of your patient.       Sincerely,    VAMSI Sparrow CNP

## 2021-09-10 NOTE — PATIENT INSTRUCTIONS
Therapies:   1. Physical Therapy: continue regular physical activity as able.   2. Clinical Health Psychologist to address issues of relaxation, behavioral change, coping style, and other factors important to improvement: not at this time  Medication Management:   1. Okay to restart Tramadol 25mg twice daily as needed for severe pain. Nursing will contact you to set up an appointment to complete requirements for ongoing prescribing.   2. As discussed, there is a dangerous interaction between clonazepam and Tramadol, especially with a hx of COPD. To minimize the risk, I recommend discontinuing this medication.  Further procedures recommended:   3. It might be reasonable to repeat suprascapular nerve blocks but will discuss first with Dr. Allen. If he agrees, we will order.     Follow up: TBD.

## 2021-09-12 ENCOUNTER — HEALTH MAINTENANCE LETTER (OUTPATIENT)
Age: 81
End: 2021-09-12

## 2021-09-13 ENCOUNTER — TELEPHONE (OUTPATIENT)
Dept: ANESTHESIOLOGY | Facility: CLINIC | Age: 81
End: 2021-09-13

## 2021-09-13 DIAGNOSIS — M19.012 ARTHRITIS OF BOTH SHOULDER REGIONS: Primary | ICD-10-CM

## 2021-09-13 DIAGNOSIS — M19.011 ARTHRITIS OF BOTH SHOULDER REGIONS: Primary | ICD-10-CM

## 2021-09-13 NOTE — PROGRESS NOTES
Received message from Dr. Allen, appropriate to repeat bilateral suprascapular nerve blocks. Order placed. Dr. Allen also would like Jaun to do a refresher course of physical therapy and start practicing home exercise program on a regular basis. Order placed. Please call Jaun to update him.    VAMSI Zuluaga [facility] Pain Management

## 2021-09-13 NOTE — TELEPHONE ENCOUNTER
Prior Authorization Retail Medication Request    Medication/Dose: Tramadol 50 mg tablet  ICD code (if different than what is on RX):    Previously Tried and Failed:    Rationale:      Insurance Name:    Insurance ID:        Pharmacy Information (if different than what is on RX)  Name:    Phone:

## 2021-09-13 NOTE — TELEPHONE ENCOUNTER
Central Prior Authorization Team   Phone: [phone]      PA Initiation    Medication: traMADol (ULTRAM) 50 MG tablet  Insurance Company:    Pharmacy Filling the Rx: [id number] [place] [facility] - [place] - [address]  Filling Pharmacy Phone: [phone]  Filling Pharmacy Fax:    Start Date: 9/13/2021

## 2021-09-13 NOTE — TELEPHONE ENCOUNTER
RN created an encounter for PA for this medication. Received PA approval for this medication, per PA note the pharmacy will contact the patient and issue a refund.    Linda Serrano RN

## 2021-09-13 NOTE — TELEPHONE ENCOUNTER
Prior Authorization Approval  Per pharmacist, patient paid cash price for this medication on 09/10/2021. Need the authorization backdated to 09/10/2021.   I called insurance, spoke with Sanjay who was able to get the authorization updated to be active as of 09/10/2021. Called Wilma/pharmacist back to relay this information. She will reprocess rx and contact patient to issue a refund.          Authorization Effective Date: 9/10/2021  Authorization Expiration Date: 9/12/2022  Medication: traMADol (ULTRAM) 50 MG tablet  Approved Dose/Quantity: 60 tabs per 60 days  Reference #:     Insurance Company:    Expected CoPay:        Which Pharmacy is filling the prescription (Not needed for infusion/clinic administered): [facility] [id number] [place] [facility] [address]  Pharmacy Notified: Yes  Patient Notified: No

## 2021-09-14 DIAGNOSIS — Z11.59 ENCOUNTER FOR SCREENING FOR OTHER VIRAL DISEASES: ICD-10-CM

## 2021-09-14 NOTE — TELEPHONE ENCOUNTER
Emt called patient and scheduled him for a UDS and CSA on 9/20/2 @ 0900.      Larry Washington, EMT

## 2021-09-15 ENCOUNTER — ANTICOAGULATION THERAPY VISIT (OUTPATIENT)
Dept: NURSING | Facility: CLINIC | Age: 81
End: 2021-09-15

## 2021-09-15 ENCOUNTER — TELEPHONE (OUTPATIENT)
Dept: FAMILY MEDICINE | Facility: CLINIC | Age: 81
End: 2021-09-15

## 2021-09-15 ENCOUNTER — LAB (OUTPATIENT)
Dept: LAB | Facility: CLINIC | Age: 81
End: 2021-09-15
Payer: MEDICARE

## 2021-09-15 DIAGNOSIS — I82.4Z9 DEEP VEIN THROMBOSIS (DVT) OF DISTAL VEIN OF LOWER EXTREMITY, UNSPECIFIED CHRONICITY, UNSPECIFIED LATERALITY (H): ICD-10-CM

## 2021-09-15 DIAGNOSIS — I26.99 PE (PULMONARY THROMBOEMBOLISM) (H): Primary | ICD-10-CM

## 2021-09-15 DIAGNOSIS — I48.0 PAROXYSMAL A-FIB (H): ICD-10-CM

## 2021-09-15 DIAGNOSIS — I26.99 PE (PULMONARY THROMBOEMBOLISM) (H): ICD-10-CM

## 2021-09-15 LAB — INR BLD: 2.6 (ref 0.9–1.1)

## 2021-09-15 PROCEDURE — 85610 PROTHROMBIN TIME: CPT

## 2021-09-15 PROCEDURE — 36416 COLLJ CAPILLARY BLOOD SPEC: CPT

## 2021-09-15 NOTE — PROGRESS NOTES
Anticoagulation Management    Unable to reach Jaun today.    Today's INR result of 2.6 is therapeutic (goal INR of 2.0-3.0).  Result received from: Clinic Lab    Follow up required to confirm warfarin dose taken and assess for changes    Left a detailed voicemail message to continue taking 7.5 mg of warfarin on Fridays and 5 mg all other days. It looks like he has another procedure coming up on 9/23/21. Pt had the same procedure done on 8/26/21. See 8/5/21 telephone encounter. Pt was to HOLD warfarin X 5 days no bridging. Pt advised that this would be reviewed with a pharmacist and his doctor. Tentative plan to HOLD warfarin starting on 9/18/21 then hold 9/19/21,9/20/21,9/21/21 and 9/22/21. Requested a call back to discuss.    Transfer to [phone]      Anticoagulation clinic to follow up    Delia Jang RN

## 2021-09-15 NOTE — TELEPHONE ENCOUNTER
Pt is scheduled for a repeat procedure on 9/23/21. Pt had the same procedure done on 8/26/21. See 8/5/21 telephone encounter for HOLD plan review. Please review and advise HOLD plan for upcoming procedure.

## 2021-09-15 NOTE — TELEPHONE ENCOUNTER
[facility]ED[facility] ANTICOAGULATION  MANAGEMENT    ASSESSMENT     Warfarin interruption plan for bilateral suprascapular nerve block on 9/23/21.    See 8/5/21 TE     RECOMMENDATION       Pre-Procedure:  o Hold warfarin for 5 days, until after procedure starting: Saturday, September 18   o No Bridge      Post-Procedure:  o Resume warfarin dose if okay with provider doing procedure on night of procedure, Thursday, September 23 PM:take 10 mg x 1, then resume home dose  o Recheck INR ~ 7 days after resuming warfarin       Plan routed to referring provider for approval  ?   Rohini [facility]    SUBJECTIVE/OBJECTIVE     Erich Craven, a 81 year old male    Goal INR Range: 2.0-3.0     Patient bridged in past: Not for procedural interruption      Wt Readings from Last 3 Encounters:   08/26/21 133.4 kg (294 lb)   06/23/21 133.4 kg (294 lb)   06/22/21 133.4 kg (294 lb)      Ideal body weight: 77.6 kg (171 lb 1.2 oz)  Adjusted ideal body weight: 99.9 kg (220 lb 3.9 oz)     Estimated body mass index is 39.87 kg/m  as calculated from the following:    Height as of 8/26/21: 1.829 m (6').    Weight as of 8/26/21: 133.4 kg (294 lb).    Lab Results   Component Value Date    INR 2.6 (H) 09/15/2021    INR 2.5 (H) 08/31/2021    INR 2.8 (H) 08/04/2021     Lab Results   Component Value Date    HGB 12.2 06/14/2021    HCT 36.7 06/14/2021     06/14/2021     Lab Results   Component Value Date    CR 1.07 08/31/2021    CR 1.57 (H) 06/21/2021    CR 1.81 (H) 06/14/2021     Estimated Creatinine Clearance: 75.2 mL/min (based on SCr of 1.07 mg/dL).

## 2021-09-16 ENCOUNTER — VIRTUAL VISIT (OUTPATIENT)
Dept: UROLOGY | Facility: CLINIC | Age: 81
End: 2021-09-16
Attending: FAMILY MEDICINE
Payer: MEDICARE

## 2021-09-16 VITALS — BODY MASS INDEX: 38.6 KG/M2 | HEIGHT: 72 IN | WEIGHT: 285 LBS

## 2021-09-16 DIAGNOSIS — R35.0 URINARY FREQUENCY: Primary | ICD-10-CM

## 2021-09-16 DIAGNOSIS — Z87.440 PERSONAL HISTORY OF URINARY TRACT INFECTION: ICD-10-CM

## 2021-09-16 DIAGNOSIS — R39.15 URINARY URGENCY: ICD-10-CM

## 2021-09-16 PROCEDURE — 99203 OFFICE O/P NEW LOW 30 MIN: CPT | Mod: 95 | Performed by: STUDENT IN AN ORGANIZED HEALTH CARE EDUCATION/TRAINING PROGRAM

## 2021-09-16 ASSESSMENT — PAIN SCALES - GENERAL: PAINLEVEL: MILD PAIN (3)

## 2021-09-16 ASSESSMENT — MIFFLIN-ST. JEOR: SCORE: 2030.75

## 2021-09-16 NOTE — PROGRESS NOTES
ANTICOAGULATION MANAGEMENT     Erich Craven 81 year old male is on warfarin with therapeutic INR result. (Goal INR 2.0-3.0)    Recent labs: (last 7 days)     09/15/21  1026   INR 2.6*       ASSESSMENT     Source(s): Chart Review       Previous INR: Therapeutic last 2(+) visits    Additional findings: Upcoming surgery/procedure nerve block on 9/23.  Okay to hold x 5 days approved by [facility] and PCP      PLAN     Recommended plan for no diet, medication or health factor changes affecting INR     Dosing Instructions: Continue your current warfarin dose with next INR in 2 weeks       Summary  As of 9/15/2021    Full warfarin instructions:  9/18: Hold; 9/19: Hold; 9/20: Hold; 9/21: Hold; 9/22: Hold; 9/23: 10 mg; Otherwise 7.5 mg every Fri; 5 mg all other days   Next INR check:  10/1/2021             Detailed voice message left for Erich with dosing instructions and follow up date.   Sent [employer] message with dosing and follow up instructions    Contact [phone]  to schedule and with any changes, questions or concerns.     Education provided: None required    Plan made per [facility] anticoagulation protocol    Evita Lloyd RN  Anticoagulation Clinic  9/16/2021    _______________________________________________________________________     Anticoagulation Episode Summary     Current INR goal:  2.0-3.0   TTR:  74.7 % (1 y)   Target end date:  Indefinite   Send INR reminders to:  [facility]    Indications    PE (pulmonary thromboembolism) (H) [I26.99]  Paroxysmal A-fib (H) [I48.0]  Deep vein thrombosis (DVT) of distal vein of lower extremity  unspecified chronicity  unspecified laterality (H) [I82.4Z9]           Comments:           Anticoagulation Care Providers     Provider Role Specialty Phone number    Dontrell Gómez MD Referring Internal Medicine [phone]    Anne Heath MD Referring Internal Medicine [phone]

## 2021-09-16 NOTE — PROGRESS NOTES
Jaun is a 81 year old who is being evaluated via a billable video visit.      How would you like to obtain your AVS? MyChart  If the video visit is dropped, the invitation should be resent by: Other e-mail: MY CHART  Will anyone else be joining your video visit? No  Estee Helm CMA      Video Start Time: 11:31 AM    [facility]  Main Office: [address]  [address]  [place] [address]       CHIEF COMPLAINT:  Urinary frequency    HISTORY:   80 yo M with LUTS including urinary frequency. Patient attributes frequency to diuretics. Has been on long term tamsulosin 0.4 mg every day.     Nocturia 2x. Denies slow or weak stream. He feels like he empties completely. Does not need to strain or push. Does have some urgency with the frequency    In Jun 2021 was having dysuria, fatigue. Noted to have significant pyuria on UA. Urine culture eventually was no growth. Empiric abx resolved the symptoms. Prior UA in April 2021 was unremarkable. Last UTI was about 5 years ago.      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-49.9 (morbid obesity) (H)      RIN (obstructive sleep apnea)     has refused CPAP     Pacemaker      Paroxysmal A-fib (H)      PE (pulmonary thromboembolism) (H)        PAST SURGICAL HISTORY:   Past Surgical History:   Procedure Laterality Date     cardiac stenting       CV RIGHT HEART CATH MEASUREMENTS RECORDED N/A 11/5/2019    Procedure: Right Heart Cath;  Surgeon: David  Roberto SAMANO MD;  Location:  HEART CARDIAC CATH LAB     ENT SURGERY      tonsillectomy     IMPLANT PACEMAKER       INJECT NERVE BLOCK SUPRASCAPULAR Bilateral 8/26/2021    Procedure: BLOCK, NERVE, SUPRASCAPULAR, Bilateral, under ultrasound guidance;  Surgeon: Swetha Allen MD;  [place] [facility] [place]     ORTHOPEDIC SURGERY Right 2012    knee replacement       FAMILY HISTORY:   Family History   Problem Relation Age of Onset     Hypertension Father        SOCIAL HISTORY:   Social History     Tobacco Use     Smoking status: Former Smoker     Packs/day: 0.50     Years: 25.00     Pack years: 12.50     Smokeless tobacco: Never Used     Tobacco comment: quit in 1989   Substance Use Topics     Alcohol use: 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; recovering          Allergies   Allergen Reactions     No Known Allergies          Current Outpatient Medications:      acetaminophen (TYLENOL) 500 MG tablet, Take 1,000 mg by mouth 4 times daily , Disp: , Rfl:      Acetylcarnitine HCl (ACETYL L-CARNITINE) 500 MG CAPS, Take 1 capsule by mouth 2 times daily, Disp: , Rfl:      albuterol (PROAIR HFA/PROVENTIL HFA/VENTOLIN HFA) 108 (90 Base) MCG/ACT inhaler, Inhale 2 puffs into the lungs every 4 hours as needed for shortness of breath / dyspnea or wheezing, Disp: 1 Inhaler, Rfl: 0     aspirin (ASA) 81 MG chewable tablet, Take 1 tablet by mouth daily, Disp: , Rfl:      atorvastatin (LIPITOR) 40 MG tablet, TAKE 1 TABLET BY MOUTH EVERY EVENING, Disp: 90 tablet, Rfl: 2     carvedilol (COREG) 12.5 MG tablet, TAKE 1 TABLET BY MOUTH TWICE A DAY, Disp: 180 tablet, Rfl: 1     co-enzyme Q-10 100 MG CAPS capsule, Take 100 mg by mouth daily, Disp: , Rfl:      diclofenac (VOLTAREN) 1 % topical gel, Apply 1 g topically 2 times daily as needed for moderate pain, Disp: 50 g, Rfl: 0     diltiazem ER COATED BEADS (CARDIZEM CD/CARTIA XT) 300 MG 24 hr capsule, TAKE 1 CAPSULE BY MOUTH EVERY DAY, Disp: 90 capsule, Rfl: 3     Fluticasone-Umeclidin-Vilanterol (TRELEGY  ELLIPTA) 200-62.5-25 MCG/INH oral inhaler, Inhale 1 puff into the lungs daily, Disp: , Rfl:      furosemide (LASIX) 20 MG tablet, Take 2 tablets (40 mg) by mouth 2 times daily, Disp: 360 tablet, Rfl: 3     isosorbide mononitrate (IMDUR) 30 MG 24 hr tablet, TAKE 1 TABLET BY MOUTH EVERY DAY, Disp: 90 tablet, Rfl: 3     levothyroxine (SYNTHROID/LEVOTHROID) 175 MCG tablet, Take 1 tablet (175 mcg) by mouth daily Additional 1/2 tablet on Sundays, Disp: 90 tablet, Rfl: 2     lidocaine (LMX4) 4 % external cream, Apply topically as needed for mild pain, Disp: , Rfl:      lisinopril (ZESTRIL) 40 MG tablet, Take 1 tablet (40 mg) by mouth daily, Disp: 90 tablet, Rfl: 3     metolazone (ZAROXOLYN) 2.5 MG tablet, Take 1 tablet (2.5 mg) by mouth 2 times daily, Disp: 180 tablet, Rfl: 3     Multiple vitamin TABS, Take 1 tablet by mouth daily, Disp: , Rfl:      order for DME, Oxygen 2 Li/min Continuous. At night bled into PAP device. In the daytime, NC with bubbler: requiring portability, Disp: 1 Device, Rfl: 0     sertraline (ZOLOFT) 25 MG tablet, Take 2 tablets (50 mg) by mouth daily, Disp: 180 tablet, Rfl: 1     spironolactone (ALDACTONE) 25 MG tablet, TAKE 1 TABLET (25 MG) BY MOUTH EVERY OTHER DAY, Disp: 45 tablet, Rfl: 0     tamsulosin (FLOMAX) 0.4 MG capsule, Take 1 capsule (0.4 mg) by mouth daily, Disp: 90 capsule, Rfl: 0     Taurine 500 MG CAPS, Take 500 mg by mouth 2 times daily, Disp: , Rfl:      warfarin ANTICOAGULANT (COUMADIN) 5 MG tablet, Take 1.5 tablets (7.5 mg) on Fridays and take 1 tablet (5 mg) all other days or as directed by the INR clinic, Disp: 100 tablet, Rfl: 0    Review Of Systems:  Skin: No rash, pruritis, or skin pigmentation  Eyes: No changes in vision  Ears/Nose/Throat: No changes in hearing, no nosebleeds  Respiratory: No shortness of breath, dyspnea on exertion, cough, or hemoptysis  Cardiovascular: No chest pain or palpitations  Gastrointestinal: No diarrhea or constipation. No abdominal pain. No  hematochezia  Genitourinary: see HPI  Musculoskeletal: No pain or swelling of joints, normal range of motion  Neurologic: No weakness or tremors  Psychiatric: No recent changes in memory or mood  Hematologic/Lymphatic/Immunologic: No easy bruising or enlarged lymph nodes  Endocrine: No weight gain or loss      PHYSICAL EXAM:    Ht 1.829 m (6')   Wt 129.3 kg (285 lb)   BMI 38.65 kg/m    General appearance: In NAD, conversant  HEENT: Normocephalic and atraumatic, anicteric sclera  Cardiovascular: Not examined  Respiratory: normal, non-labored breathing  Gastrointestinal: negative, Abdomen soft, non-tender, and non-distended.   Musculoskeletal: Not Examined  Peripheral Vascular/extremity: No peripheral edema  Skin: Normal temperature, turgor, and texture. No rash  Psychiatric: Appropriate affect, alert and oriented to person, place, and time    Cystoscopy: Not done    UA RESULTS:  Recent Labs   Lab Test 06/14/21  1516   COLOR Yellow   APPEARANCE Cloudy   URINEGLC Negative   URINEBILI Negative   URINEKETONE Negative   SG 1.020   UBLD Small*   URINEPH 5.0   PROTEIN 30*   UROBILINOGEN 0.2   NITRITE Negative   LEUKEST Moderate*   RBCU Unable to quantitate other elements due to packed WBC's. *   WBCU >100*       Bladder Scan:     Other Labs:      Imaging Studies: None      CLINICAL IMPRESSION:   82 yo M with LUTS including urinary frequency, urgency. Denies significant obstructive voiding symptoms. Is doing well on alpha blocker. Declines changes to medication regimen at this time. Had a UTI three months ago and prior to that one about 5 years ago. If continues to have UTI will need to do further workup. For now, will ensure that he is emptying his bladder with a nurse visit for PVR    PLAN:   Continue tamsulosin 0.4 mg daily  Nurse visit for bladder scan PVR. If PVR <200 ml, can follow up in 1 year with UA, PVR, AUA symptom score  If PVR >200 ml, should see me back earlier for a cystoscopy    Sebastian Holland MD   M  [facility]  [phone] clinic phone    Video-Visit Details    Type of service:  Video Visit    Video End Time:11:41 AM    Originating Location (pt. Location): Home    Distant Location (provider location):  [facility] [place]     Platform used for Video Visit: Quintin

## 2021-09-16 NOTE — Clinical Note
Nurse visit for bladder scan PVR. If PVR <200 ml, can follow up in 1 year with UA, PVR, AUA symptom score  If PVR >200 ml, should see me back earlier for a cystoscopy

## 2021-09-16 NOTE — TELEPHONE ENCOUNTER
Detailed message left on VM and my chart message also sent to patient.    Evita Lloyd RN  [facility]

## 2021-09-17 NOTE — TELEPHONE ENCOUNTER
Plan approved by provider as written.     [facility]    Documentation       Anne Heath MD  You 2 days ago     KS    Yes agree with plan, thank you. KS    Message text       You  Anne Heath MD; Anticoag [place] 2 days ago     LF    Jaun Napoles is schedule for another bilateral suprascapular nerve block on 9/23. Recommend a 5-day warfarin interruption with no enoxaparin bridge. Do you agree with this plan?   [facility]      Routing comment

## 2021-09-20 ENCOUNTER — ALLIED HEALTH/NURSE VISIT (OUTPATIENT)
Dept: ANESTHESIOLOGY | Facility: CLINIC | Age: 81
End: 2021-09-20
Payer: MEDICARE

## 2021-09-20 ENCOUNTER — ALLIED HEALTH/NURSE VISIT (OUTPATIENT)
Dept: UROLOGY | Facility: CLINIC | Age: 81
End: 2021-09-20
Payer: MEDICARE

## 2021-09-20 ENCOUNTER — LAB (OUTPATIENT)
Dept: URGENT CARE | Facility: URGENT CARE | Age: 81
End: 2021-09-20
Payer: MEDICARE

## 2021-09-20 DIAGNOSIS — Z79.891 OPIOID CONTRACT EXISTS: Primary | ICD-10-CM

## 2021-09-20 DIAGNOSIS — Z11.59 ENCOUNTER FOR SCREENING FOR OTHER VIRAL DISEASES: ICD-10-CM

## 2021-09-20 DIAGNOSIS — R39.15 URINARY URGENCY: Primary | ICD-10-CM

## 2021-09-20 LAB
CREAT UR-MCNC: 30 MG/DL
SARS-COV-2 RNA RESP QL NAA+PROBE: NEGATIVE

## 2021-09-20 PROCEDURE — U0003 INFECTIOUS AGENT DETECTION BY NUCLEIC ACID (DNA OR RNA); SEVERE ACUTE RESPIRATORY SYNDROME CORONAVIRUS 2 (SARS-COV-2) (CORONAVIRUS DISEASE [COVID-19]), AMPLIFIED PROBE TECHNIQUE, MAKING USE OF HIGH THROUGHPUT TECHNOLOGIES AS DESCRIBED BY CMS-2020-01-R: HCPCS

## 2021-09-20 PROCEDURE — 80307 DRUG TEST PRSMV CHEM ANLYZR: CPT | Performed by: ANESTHESIOLOGY

## 2021-09-20 PROCEDURE — 82570 ASSAY OF URINE CREATININE: CPT | Performed by: PATHOLOGY

## 2021-09-20 PROCEDURE — 51798 US URINE CAPACITY MEASURE: CPT

## 2021-09-20 PROCEDURE — U0005 INFEC AGEN DETEC AMPLI PROBE: HCPCS

## 2021-09-20 PROCEDURE — 99207 PR NO CHARGE NURSE ONLY: CPT

## 2021-09-20 RX ORDER — TRAMADOL HYDROCHLORIDE 50 MG/1
TABLET ORAL
Qty: 20 TABLET | Refills: 0 | OUTPATIENT
Start: 2021-09-20

## 2021-09-20 NOTE — PROGRESS NOTES
Controlled Substance Agreement form signed on 9/20/213 with with Dr. Allen Provider at [facility].    Last UDS collected 9/20/21.    Pt stated they last took 1/4 tablet of Tramadol art 530 am on 9/20/21    Shani Shelton LPN

## 2021-09-20 NOTE — PROGRESS NOTES
Erich Craven comes into clinic today at the request of  Ordering Provider for PVR.  This service provided today was under the supervising provider of the liliana ZAMBRANO, who was available if needed.  post void residual 35 ml,will see  in one year  Mary Anne Irizarry LPN

## 2021-09-22 LAB
7AMINOCLONAZEPAM UR QL CFM: PRESENT
CREATININE URINE MG/DL  (SYNCED VALUE): 30 MG/DL
N-NORTRAMADOL/CREAT UR CFM: 9533 NG/MG {CREAT}
O-NORTRAMADOL UR CFM-MCNC: 2860 NG/ML
TRAMADOL CTO UR CFM-MCNC: 1920 NG/ML
TRAMADOL/CREAT UR: 6400 NG/MG {CREAT}

## 2021-09-23 ENCOUNTER — ANCILLARY PROCEDURE (OUTPATIENT)
Dept: RADIOLOGY | Facility: AMBULATORY SURGERY CENTER | Age: 81
End: 2021-09-23
Attending: ANESTHESIOLOGY
Payer: MEDICARE

## 2021-09-23 ENCOUNTER — HOSPITAL ENCOUNTER (OUTPATIENT)
Facility: AMBULATORY SURGERY CENTER | Age: 81
Discharge: HOME | End: 2021-09-23
Attending: ANESTHESIOLOGY | Admitting: ANESTHESIOLOGY
Payer: MEDICARE

## 2021-09-23 VITALS
HEART RATE: 74 BPM | DIASTOLIC BLOOD PRESSURE: 72 MMHG | OXYGEN SATURATION: 97 % | SYSTOLIC BLOOD PRESSURE: 126 MMHG | RESPIRATION RATE: 19 BRPM | BODY MASS INDEX: 38.6 KG/M2 | WEIGHT: 285 LBS | TEMPERATURE: 96.8 F | HEIGHT: 72 IN

## 2021-09-23 DIAGNOSIS — M19.011 ARTHRITIS OF BOTH SHOULDER REGIONS: ICD-10-CM

## 2021-09-23 DIAGNOSIS — R52 PAIN: ICD-10-CM

## 2021-09-23 DIAGNOSIS — M19.012 ARTHRITIS OF BOTH SHOULDER REGIONS: ICD-10-CM

## 2021-09-23 LAB — GLUCOSE BLDC GLUCOMTR-MCNC: 89 MG/DL (ref 70–99)

## 2021-09-23 PROCEDURE — 64418 NJX AA&/STRD SPRSCAP NRV: CPT | Mod: LT

## 2021-09-23 PROCEDURE — 64418 NJX AA&/STRD SPRSCAP NRV: CPT | Mod: 50 | Performed by: ANESTHESIOLOGY

## 2021-09-23 PROCEDURE — 76942 ECHO GUIDE FOR BIOPSY: CPT | Mod: 26 | Performed by: ANESTHESIOLOGY

## 2021-09-23 RX ORDER — BUPIVACAINE HYDROCHLORIDE 2.5 MG/ML
INJECTION, SOLUTION EPIDURAL; INFILTRATION; INTRACAUDAL PRN
Status: DISCONTINUED | OUTPATIENT
Start: 2021-09-23 | End: 2021-09-23 | Stop reason: HOSPADM

## 2021-09-23 RX ORDER — TRAZODONE HYDROCHLORIDE 100 MG/1
100 TABLET ORAL AT BEDTIME
COMMUNITY
End: 2021-11-02

## 2021-09-23 RX ORDER — LIDOCAINE HYDROCHLORIDE 10 MG/ML
INJECTION, SOLUTION EPIDURAL; INFILTRATION; INTRACAUDAL; PERINEURAL PRN
Status: DISCONTINUED | OUTPATIENT
Start: 2021-09-23 | End: 2021-09-23 | Stop reason: HOSPADM

## 2021-09-23 RX ORDER — VENLAFAXINE 37.5 MG/1
37.5 TABLET ORAL 2 TIMES DAILY
COMMUNITY
Start: 2021-07-31 | End: 2021-10-20 | Stop reason: ALTCHOICE

## 2021-09-23 RX ORDER — TRIAMCINOLONE ACETONIDE 40 MG/ML
INJECTION, SUSPENSION INTRA-ARTICULAR; INTRAMUSCULAR PRN
Status: DISCONTINUED | OUTPATIENT
Start: 2021-09-23 | End: 2021-09-23 | Stop reason: HOSPADM

## 2021-09-23 ASSESSMENT — MIFFLIN-ST. JEOR: SCORE: 2030.75

## 2021-09-23 NOTE — DISCHARGE INSTRUCTIONS
"PAIN INJECTION HOME CARE INSTRUCTIONS  Activity  -You may resume most normal activity levels with the exception of strenuous activity. It may help to move in ways that hurt before the injection, to see if the pain is still there, but do not overdo it. Take it easy for the rest of the day.    -DO NOT remove bandaid for 24 hours  -DO NOT shower for 24 hours      Pain  -You may feel immediate pain relief and numbness for a period of time after the injection. This may indicate the medication has reached the right spot.  -Your pain may return after this short pain-free period, or may even be a little worse for a day or two. It may be caused by needle irritation or by the medication itself. The medications usually take two or three days to start working, but can take as long as a week.    -You may use an ice pack for 20 minutes every 2 hours for the first 24 hours  -You may use a heating pad after the first 24 hours  -You may use Tylenol (acetaminophen) every 4 hours or other pain medicines as directed by your physician      DID YOU RECEIVE SEDATION TODAY?  {YES / NO:681716::\"Yes\"}    If you received sedation please follow these additional safety measures.    Sedation medicine, if given, may remain active for many hours. It is important for the next 24 hours that you do not:  -Drive a car  -Operate machines or power tools  -Consume alcohol, including beer  -Sign any important papers or legal documents    DID YOU RECEIVE STEROIDS TODAY?  {YES / NO:056799::\"Yes\"}    Common side effects of steroids:  Not everyone will experience corticosteroid side effects. If side effects are experienced, they will gradually subside in the 7-10 day period following an injection. Most common side effects include:  -Flushed face and/or chest  -Feeling of warmth, particularly in the face but could be an overall feeling of warmth  -Increased blood sugar in diabetic patients  -Menstrual irregularities my occur. If taking hormone-based birth " control an alternate method of birth control is recommended  -Sleep disturbances and/or mood swings are possible  -Leg cramps    PLEASE KEEP TRACK OF YOUR SYMPTOMS AND NOTE ANY CHANGES FOR YOUR DOCTOR.       Please contact us if you have:  -Severe pain  -Fever more than 101.5 degrees Fahrenheit  -Signs of infection at the injection site (redness, swelling, or drainage)    If you have questions, please contact the Pain Clinic at [phone] Option #1 between the hours of 7:00 am and 3:00 pm Monday through Friday. After office hours you can contact the on call provider by dialing [phone]. If you need immediate attention, we recommend that you go to a hospital emergency room or dial [phone].    For patients seen by the PM and R service, please call [phone].    If you have procedure scheduling questions please call [phone] Option #2

## 2021-09-23 NOTE — OP NOTE
Patient: Erich Craven Age: 81 year old   MRN: [id number] Attending: Dr. Allen            PAIN MEDICINE CLINIC PROCEDURE NOTE     ATTENDING CLINICIAN:    Swetha Allen MD     ASSISTANT CLINICIAN:  Randy Floyd MD     PREPROCEDURE DIAGNOSES:  1.  Bilateral shoulder pain  2.  Bilateral rotator cuff tear     PROCEDURE(S) PERFORMED:  1.  Bilateral suprascapular nerve blocks  2.  Ultrasound guidance for the above-named procedure(s)        ANESTHESIA:  Local.     BLOOD LOSS:  Minimal.     DRAINS AND SPECIMENS:  None.     COMPLICATIONS:  None.     INDICATIONS:  Erich Craven is a 81 year old male with a history of  chronic shoulder pain secondary to rotator cuff tear.  The patient stated that the patient was in their usual state of health and denied recent anticoagulant use or recent infections.  Therefore, the plan is ti perform above mentioned procedure.      Procedure Details:     The patient was met in the procedure room, where the patient was identified by name, medical record number and date of birth.  All of the patient s last minute questions were answered. Written informed consent was obtained and saved in the electronic medical record, after the risks, benefits, and alternatives were discussed with the patient.       A formal time-out procedure was performed, as per protocol, including patient name, title of procedure, and site of procedure, and all in the room concurred.  Routine monitors were applied.       The patient was placed in the sitting position on the procedure room table.Routine monitors were placed.  Vital signs were stable.     A chlorhexidine prep was completed followed by sterile draping per standard procedure.  Patient's consent was obtained.  The right side Suprascapular area was prepped using Chloraprep and the area was sterilely draped.  The ultrasound probe was draped and the U/S was used to identify and confirm the depth.  Fascial planes were easily visible as well as subcutaneous  tissue and the supraspinatus fossae with the scapula as the deep border.  A 21G 100 mm  pajunk needle was used in an in-plane fashion and positioned deep to the hyperechoic suprascapular ligament taking care to avoid the visibly pulsatile suprascapular artery.   ~1ml of the steroid local anesthetic was injected to confirm correct placement of the needle tip prior to injection of the remaining 5 mls of 0.25% bupivacaine 4.5 ml and 20 mg( 0.5 ml) of triamcinolone  into the area, with several aspirations being negative for blood.  A screen shot,was taken of nicely extravasating fluid into the the expected location of the supraspinatus nerve.  The needle was removed, and a band-aid was applied.     Exact same procedure performed on the left side.     Light pressure was held at the puncture site(s) to prevent ecchymosis and oozing.  The patient's skin was cleansed, and hemostasis was confirmed.  Band-aids were applied to the needle injection site(s).       Condition:     The patient remained awake and alert throughout the procedure.  The patient tolerated the procedure well and was monitored for approximately 15 minutes afterward in the post procedure area.  There were no immediate post procedure complications noted.  The patient was then discharged to home as per protocol.       Pre-procedure pain score: 5/10  Post-procedure pain score: 1/10

## 2021-09-24 ENCOUNTER — ANCILLARY PROCEDURE (OUTPATIENT)
Dept: CARDIOLOGY | Facility: CLINIC | Age: 81
End: 2021-09-24
Attending: INTERNAL MEDICINE
Payer: MEDICARE

## 2021-09-24 DIAGNOSIS — Z95.0 CARDIAC PACEMAKER IN SITU: ICD-10-CM

## 2021-09-24 PROCEDURE — 93280 PM DEVICE PROGR EVAL DUAL: CPT | Performed by: INTERNAL MEDICINE

## 2021-09-26 NOTE — PROGRESS NOTES
[facility] Initial Evaluation    Subjective:  Erich Craven is a 81 year old male.    Patient s chief complaints: B shoulder pain    Condition occurred due to aging/degenerative changes.  Date of Onset: 9/13/21 PT referral date, patient notes pain ongoing over the past 20 years.  Location of symptoms is anterior glenohumeral joint areas, sometimes down upper arm to the elbow  Symptoms other than pain include: difficulty reaching   Quality of pain is aching and frequency is intermittent.    Pain dependence on time of day is: not dependent on time of day.   Pain rating is: 3-8/10  Symptoms are exacerbated by: reaching up especially shoulder or above, lifting (eg groceries), push/pull, dressing.    Symptoms are relieved by:  Medication, injection  Progression of symptoms is that symptoms are:  Somewhat better after recent nerve block procedure (notes that it lasts a few weeks typically)    Imaging/Special tests include: x-rays:  Impression: Bilateral shoulder x-rays demonstrate severe degenerative joint disease of both shoulders.  Noticeable erosion of the glenoid in the right shoulder is noted.  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  No significant proximal migration of the humeral head is noted bilaterally.  Otherwise, no acute fractures or other osseous pathology noted.    Previous treatments include: medication, nerve block with temporary relief  Patient reports that general health is: fair     Pertinent medical history includes:    Past Medical History:   Diagnosis Date     (HFpEF) heart failure with preserved ejection fraction (H)      Arthritis      BPH with urinary obstruction      CAD (coronary artery disease)     PCI w/ SUMMER in 2014     Cardiac pacemaker in situ      Chronic diastolic heart failure (H)      Complication of anesthesia      COPD (chronic obstructive pulmonary disease) (H)      Diabetes (H)      DVT (deep venous thrombosis) (H)      Dyspnea      Essential hypertension       Fatigue      HLD (hyperlipidemia)      Hx of long term use of blood thinners     Due to PE. Started in 2012     Hypothyroidism      Lymphedema      Mumps      Obesity, Class III, BMI 40-49.9 (morbid obesity) (H)      RIN (obstructive sleep apnea)     has refused CPAP     Pacemaker      Paroxysmal A-fib (H)      PE (pulmonary thromboembolism) (H)      Medical allergies includes:   No known allergies  Surgical history includes:   Past Surgical History:   Procedure Laterality Date     cardiac stenting       CV RIGHT HEART CATH MEASUREMENTS RECORDED N/A 11/5/2019    Procedure: Right Heart Cath;  Surgeon: Roberto Hernandez MD;  Location:  [facility]ATH [facility]     ENT SURGERY      tonsillectomy     IMPLANT PACEMAKER       INJECT NERVE BLOCK SUPRASCAPULAR Bilateral 8/26/2021    Procedure: BLOCK, NERVE, SUPRASCAPULAR, Bilateral, under ultrasound guidance;  Surgeon: Swetha Allen MD;  Location: [facility] OR     INJECT NERVE BLOCK SUPRASCAPULAR Bilateral 9/23/2021    Procedure: bilateral suprascapular nerve block;  Surgeon: Swetha Allen MD;  [place] [facility] [place]     ORTHOPEDIC SURGERY Right 2012    knee replacement     Current medications include:   Current Outpatient Medications:      acetaminophen (TYLENOL) 500 MG tablet, Take 1,000 mg by mouth 4 times daily , Disp: , Rfl:      Acetylcarnitine HCl (ACETYL L-CARNITINE) 500 MG CAPS, Take 1 capsule by mouth 2 times daily, Disp: , Rfl:      albuterol (PROAIR HFA/PROVENTIL HFA/VENTOLIN HFA) 108 (90 Base) MCG/ACT inhaler, Inhale 2 puffs into the lungs every 4 hours as needed for shortness of breath / dyspnea or wheezing, Disp: 1 Inhaler, Rfl: 0     aspirin (ASA) 81 MG chewable tablet, Take 1 tablet by mouth daily, Disp: , Rfl:      atorvastatin (LIPITOR) 40 MG tablet, TAKE 1 TABLET BY MOUTH EVERY EVENING, Disp: 90 tablet, Rfl: 2     carvedilol (COREG) 12.5 MG tablet, TAKE 1 TABLET BY MOUTH TWICE A DAY, Disp: 180 tablet, Rfl: 1     co-enzyme Q-10 100 MG CAPS capsule, Take  100 mg by mouth daily, Disp: , Rfl:      diclofenac (VOLTAREN) 1 % topical gel, Apply 1 g topically 2 times daily as needed for moderate pain, Disp: 50 g, Rfl: 0     diltiazem ER COATED BEADS (CARDIZEM CD/CARTIA XT) 300 MG 24 hr capsule, TAKE 1 CAPSULE BY MOUTH EVERY DAY, Disp: 90 capsule, Rfl: 3     Fluticasone-Umeclidin-Vilanterol (TRELEGY ELLIPTA) 200-62.5-25 MCG/INH oral inhaler, Inhale 1 puff into the lungs daily, Disp: , Rfl:      furosemide (LASIX) 20 MG tablet, Take 2 tablets (40 mg) by mouth 2 times daily, Disp: 360 tablet, Rfl: 3     isosorbide mononitrate (IMDUR) 30 MG 24 hr tablet, TAKE 1 TABLET BY MOUTH EVERY DAY, Disp: 90 tablet, Rfl: 3     levothyroxine (SYNTHROID/LEVOTHROID) 175 MCG tablet, Take 1 tablet (175 mcg) by mouth daily Additional 1/2 tablet on Sundays, Disp: 90 tablet, Rfl: 2     lidocaine (LMX4) 4 % external cream, Apply topically as needed for mild pain, Disp: , Rfl:      lisinopril (ZESTRIL) 40 MG tablet, Take 1 tablet (40 mg) by mouth daily, Disp: 90 tablet, Rfl: 3     metolazone (ZAROXOLYN) 2.5 MG tablet, Take 1 tablet (2.5 mg) by mouth 2 times daily, Disp: 180 tablet, Rfl: 3     Multiple vitamin TABS, Take 1 tablet by mouth daily, Disp: , Rfl:      order for DME, Oxygen 2 Li/min Continuous. At night bled into PAP device. In the daytime, NC with bubbler: requiring portability, Disp: 1 Device, Rfl: 0     sertraline (ZOLOFT) 25 MG tablet, Take 2 tablets (50 mg) by mouth daily, Disp: 180 tablet, Rfl: 1     spironolactone (ALDACTONE) 25 MG tablet, TAKE 1 TABLET (25 MG) BY MOUTH EVERY OTHER DAY, Disp: 45 tablet, Rfl: 0     tamsulosin (FLOMAX) 0.4 MG capsule, Take 1 capsule (0.4 mg) by mouth daily, Disp: 90 capsule, Rfl: 0     Taurine 500 MG CAPS, Take 500 mg by mouth 2 times daily, Disp: , Rfl:      traZODone (DESYREL) 100 MG tablet, Take 100 mg by mouth At Bedtime, Disp: , Rfl:      venlafaxine (EFFEXOR) 37.5 MG tablet, Take 37.5 mg by mouth 2 times daily, Disp: , Rfl:      warfarin  ANTICOAGULANT (COUMADIN) 5 MG tablet, Take 1.5 tablets (7.5 mg) on Fridays and take 1 tablet (5 mg) all other days or as directed by the INR clinic, Disp: 100 tablet, Rfl: 0    Current occupation is: retired  Work status is: retired  Primary job tasks include: none  Barriers include: none    Red flags include: none    Patient's expectations for therapy include: range of motion    SHOULDER:    Cervical Screen: WNL, without reproduction of shoulder pain with cervical AROM.     Shoulder:   PROM L PROM R AROM L AROM R MMT L MMT R   Flex   Pain 115 Pain 110 5 with pain 5 with pain   Abd   Pain 95 Pain 95 4+ with pain 4+ with pain   Full Can     4+ 4+   Empty Can     4+ 4+   IR   WNL to stomach WNL to stomach 5 5   ER   End range pain 45 End range pain 45 4+ 4+   Ext/IR   To iliac crest with pain To iliac crest with pain       Scapulothoraic Rhythm: no notable scapular winging.  Intermittent superior hike at end of elevation.     Palpation: not tender to palpation  Assessment/Plan:    Patient is a 81 year old male with both sides shoulder complaints.    Patient has the following significant findings with corresponding treatment plan.                Diagnosis 1:  B shoulder pain related to osteoarthritis and degenerative changes2    Pain -  hot/cold therapy, US, electric stimulation, manual therapy and directional preference exercise  Decreased ROM/flexibility - manual therapy and therapeutic exercise  Decreased strength - therapeutic exercise and therapeutic activities  Impaired balance - neuro re-education and therapeutic activities  Decreased proprioception - neuro re-education and therapeutic activities  Edema - electric stimulation and cold therapy  Impaired gait - gait training  Decreased function - therapeutic activities  Impaired posture - neuro re-education    Therapy Evaluation Codes:   1) History comprised of:   Personal factors that impact the plan of care:      None.    Comorbidity factors that impact the plan  of care are:      None.     Medications impacting care: None.  2) Examination of Body Systems comprised of:   Body structures and functions that impact the plan of care:      Shoulder.   Activity limitations that impact the plan of care are:      Bathing, Dressing, Lifting and reaching.  3) Clinical presentation characteristics are:   Stable/Uncomplicated.  4) Decision-Making    Low complexity using standardized patient assessment instrument and/or measureable assessment of functional outcome.  Cumulative Therapy Evaluation is: Low complexity.    Previous and current functional limitations:  (See Goal Flow Sheet for this information)    Short term and Long term goals: (See Goal Flow Sheet for this information)     Communication ability:  Patient appears to be able to clearly communicate and understand verbal and written communication and follow directions correctly.  Treatment Explanation - The following has been discussed with the patient:   RX ordered/plan of care  Anticipated outcomes  Possible risks and side effects  This patient would benefit from PT intervention to resume normal activities.   Rehab potential is good.    Frequency:  1 X week, once daily  Duration:  for 6 weeks  Discharge Plan:  Achieve all LTG.  Independent in home treatment program.  Reach maximal therapeutic benefit.    Please refer to the daily flowsheet for treatment today, total treatment time and time spent performing 1:1 timed codes.

## 2021-09-27 ENCOUNTER — THERAPY VISIT (OUTPATIENT)
Dept: PHYSICAL THERAPY | Facility: CLINIC | Age: 81
End: 2021-09-27
Attending: NURSE PRACTITIONER
Payer: MEDICARE

## 2021-09-27 DIAGNOSIS — M25.512 CHRONIC PAIN OF BOTH SHOULDERS: ICD-10-CM

## 2021-09-27 DIAGNOSIS — M19.012 ARTHRITIS OF BOTH SHOULDER REGIONS: ICD-10-CM

## 2021-09-27 DIAGNOSIS — M19.011 ARTHRITIS OF BOTH SHOULDER REGIONS: ICD-10-CM

## 2021-09-27 DIAGNOSIS — G89.29 CHRONIC PAIN OF BOTH SHOULDERS: ICD-10-CM

## 2021-09-27 DIAGNOSIS — M25.511 CHRONIC PAIN OF BOTH SHOULDERS: ICD-10-CM

## 2021-09-27 PROCEDURE — 97110 THERAPEUTIC EXERCISES: CPT | Mod: GP | Performed by: PHYSICAL THERAPIST

## 2021-09-27 PROCEDURE — 97161 PT EVAL LOW COMPLEX 20 MIN: CPT | Mod: GP | Performed by: PHYSICAL THERAPIST

## 2021-09-27 NOTE — LETTER
DEPARTMENT OF HEALTH AND HUMAN SERVICES  [facility]    PLAN/UPDATED PLAN OF PROGRESS FOR OUTPATIENT REHABILITATION      PATIENTS NAME:  Erich Craven   : 1940    PROVIDER NUMBER:    [id number]    [id number]     PROVIDER NAME: [facility]    MEDICAL RECORD NUMBER: [id number]     START OF CARE DATE:  SOC Date: 21   TYPE:  PT    PRIMARY/TREATMENT DIAGNOSIS: (Pertinent Medical Diagnosis)     Arthritis of both shoulder regions  Chronic pain of both shoulders    VISITS FROM START OF CARE:  Rxs Used: 1     [facility] Initial Evaluation  Subjective:  Erich Craven is a 81 year old male.    Patient s chief complaints: B shoulder pain    Condition occurred due to aging/degenerative changes.  Date of Onset: 21 PT referral date, patient notes pain ongoing over the past 20 years.  Location of symptoms is anterior glenohumeral joint areas, sometimes down upper arm to the elbow  Symptoms other than pain include: difficulty reaching   Quality of pain is aching and frequency is intermittent.    Pain dependence on time of day is: not dependent on time of day.   Pain rating is: 3-8/10  Symptoms are exacerbated by: reaching up especially shoulder or above, lifting (eg groceries), push/pull, dressing.    Symptoms are relieved by:  Medication, injection  Progression of symptoms is that symptoms are:  Somewhat better after recent nerve block procedure (notes that it lasts a few weeks typically)  Imaging/Special tests include: x-rays:  Impression: Bilateral shoulder x-rays demonstrate severe degenerative joint disease of both shoulders.  Noticeable erosion of the glenoid in the right shoulder is noted.  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  No significant proximal migration of the humeral head is noted bilaterally.  Otherwise, no acute fractures or other osseous pathology noted.    Previous treatments include:  medication, nerve block with temporary relief  Patient reports that general health is: fair         Pertinent medical history includes:    Past Medical History:   Diagnosis Date     (HFpEF) heart failure with preserved ejection fraction (H)      Arthritis      BPH with urinary obstruction      CAD (coronary artery disease)     PCI w/ SUMMER in 2014     Cardiac pacemaker in situ      Chronic diastolic heart failure (H)      Complication of anesthesia      COPD (chronic obstructive pulmonary disease) (H)      Diabetes (H)      DVT (deep venous thrombosis) (H)      Dyspnea      Essential hypertension      Fatigue      HLD (hyperlipidemia)      Hx of long term use of blood thinners     Due to PE. Started in 2012     Hypothyroidism      Lymphedema      Mumps      Obesity, Class III, BMI 40-49.9 (morbid obesity) (H)      RIN (obstructive sleep apnea)     has refused CPAP     Pacemaker      Paroxysmal A-fib (H)      PE (pulmonary thromboembolism) (H)      Medical allergies includes:   No known allergies  Surgical history includes:   Past Surgical History:   Procedure Laterality Date     cardiac stenting       CV RIGHT HEART CATH MEASUREMENTS RECORDED N/A 11/5/2019    Procedure: Right Heart Cath;  Surgeon: Roberto Hernandez MD;  Location:  HEART CARDI[facility] CATH LAB     ENT SURGERY      tonsillectomy     IMPLANT PACEMAKER       INJECT NERVE BLOCK SUPRASCAPULAR Bilateral 8/26/2021    Procedure: BLOCK, NERVE, SUPRASCAPULAR, Bilateral, under ultrasound guidance;  Surgeon: Swetha Allen MD;  [place] [facility] [place]     INJECT NERVE BLOCK SUPRASCAPULAR Bilateral 9/23/2021    Procedure: bilateral suprascapular nerve block;  Surgeon: Swetha Allen MD;  [place] [facility] [place]     ORTHOPEDIC SURGERY Right 2012    knee replacement     Current medications include:   Current Outpatient Medications:      acetaminophen (TYLENOL) 500 MG tablet, Take 1,000 mg by mouth 4 times daily , Disp: , Rfl:      Acetylcarnitine HCl (ACETYL L-CARNITINE) 500 MG  CAPS, Take 1 capsule by mouth 2 times daily, Disp: , Rfl:      albuterol (PROAIR HFA/PROVENTIL HFA/VENTOLIN HFA) 108 (90 Base) MCG/ACT inhaler, Inhale 2 puffs into the lungs every 4 hours as needed for shortness of breath / dyspnea or wheezing, Disp: 1 Inhaler, Rfl: 0     aspirin (ASA) 81 MG chewable tablet, Take 1 tablet by mouth daily, Disp: , Rfl:      atorvastatin (LIPITOR) 40 MG tablet, TAKE 1 TABLET BY MOUTH EVERY EVENING, Disp: 90 tablet, Rfl: 2     carvedilol (COREG) 12.5 MG tablet, TAKE 1 TABLET BY MOUTH TWICE A DAY, Disp: 180 tablet, Rfl: 1     co-enzyme Q-10 100 MG CAPS capsule, Take 100 mg by mouth daily, Disp: , Rfl:      diclofenac (VOLTAREN) 1 % topical gel, Apply 1 g topically 2 times daily as needed for moderate pain, Disp: 50 g, Rfl: 0     diltiazem ER COATED BEADS (CARDIZEM CD/CARTIA XT) 300 MG 24 hr capsule, TAKE 1 CAPSULE BY MOUTH EVERY DAY, Disp: 90 capsule, Rfl: 3     Fluticasone-Umeclidin-Vilanterol (TRELEGY ELLIPTA) 200-62.5-25 MCG/INH oral inhaler, Inhale 1 puff into the lungs daily, Disp: , Rfl:      furosemide (LASIX) 20 MG tablet, Take 2 tablets (40 mg) by mouth 2 times daily, Disp: 360 tablet, Rfl: 3     isosorbide mononitrate (IMDUR) 30 MG 24 hr tablet, TAKE 1 TABLET BY MOUTH EVERY DAY, Disp: 90 tablet, Rfl: 3     levothyroxine (SYNTHROID/LEVOTHROID) 175 MCG tablet, Take 1 tablet (175 mcg) by mouth daily Additional 1/2 tablet on Sundays, Disp: 90 tablet, Rfl: 2     lidocaine (LMX4) 4 % external cream, Apply topically as needed for mild pain, Disp: , Rfl:      lisinopril (ZESTRIL) 40 MG tablet, Take 1 tablet (40 mg) by mouth daily, Disp: 90 tablet, Rfl: 3     metolazone (ZAROXOLYN) 2.5 MG tablet, Take 1 tablet (2.5 mg) by mouth 2 times daily, Disp: 180 tablet, Rfl: 3     Multiple vitamin TABS, Take 1 tablet by mouth daily, Disp: , Rfl:      order for DME, Oxygen 2 Li/min Continuous. At night bled into PAP device. In the daytime, NC with bubbler: requiring portability, Disp: 1 Device,  Rfl: 0     sertraline (ZOLOFT) 25 MG tablet, Take 2 tablets (50 mg) by mouth daily, Disp: 180 tablet, Rfl: 1     spironolactone (ALDACTONE) 25 MG tablet, TAKE 1 TABLET (25 MG) BY MOUTH EVERY OTHER DAY, Disp: 45 tablet, Rfl: 0     tamsulosin (FLOMAX) 0.4 MG capsule, Take 1 capsule (0.4 mg) by mouth daily, Disp: 90 capsule, Rfl: 0     Taurine 500 MG CAPS, Take 500 mg by mouth 2 times daily, Disp: , Rfl:      traZODone (DESYREL) 100 MG tablet, Take 100 mg by mouth At Bedtime, Disp: , Rfl:      venlafaxine (EFFEXOR) 37.5 MG tablet, Take 37.5 mg by mouth 2 times daily, Disp: , Rfl:      warfarin ANTICOAGULANT (COUMADIN) 5 MG tablet, Take 1.5 tablets (7.5 mg) on  and take 1 tablet (5 mg) all other days or as directed by the INR clinic, Disp: 100 tablet, Rfl: 0    Current occupation is: retired  Work status is: retired    PATIENTS NAME:  Erich Craven   : 1940    Primary job tasks include: none  Barriers include: none  Red flags include: none  Patient's expectations for therapy include: range of motion    SHOULDER:  Cervical Screen: WNL, without reproduction of shoulder pain with cervical AROM.     Shoulder:   PROM L PROM R AROM L AROM R MMT L MMT R   Flex   Pain 115 Pain 110 5 with pain 5 with pain   Abd   Pain 95 Pain 95 4+ with pain 4+ with pain   Full Can     4+ 4+   Empty Can     4+ 4+   IR   WNL to stomach WNL to stomach 5 5   ER   End range pain 45 End range pain 45 4+ 4+   Ext/IR   To iliac crest with pain To iliac crest with pain       Scapulothoraic Rhythm: no notable scapular winging.  Intermittent superior hike at end of elevation.   Palpation: not tender to palpation  Assessment/Plan:    Patient is a 81 year old male with both sides shoulder complaints.    Patient has the following significant findings with corresponding treatment plan.                Diagnosis 1:  B shoulder pain related to osteoarthritis and degenerative changes2    Pain -  hot/cold therapy, US, electric stimulation, manual  therapy and directional preference exercise  Decreased ROM/flexibility - manual therapy and therapeutic exercise  Decreased strength - therapeutic exercise and therapeutic activities  Impaired balance - neuro re-education and therapeutic activities  Decreased proprioception - neuro re-education and therapeutic activities  Edema - electric stimulation and cold therapy  Impaired gait - gait training  Decreased function - therapeutic activities  Impaired posture - neuro re-education    Therapy Evaluation Codes:   1) History comprised of:   Personal factors that impact the plan of care:      None.    Comorbidity factors that impact the plan of care are:      None.     Medications impacting care: None.  PATIENTS NAME:  Erich Craven   : 1940    2) Examination of Body Systems comprised of:   Body structures and functions that impact the plan of care:      Shoulder.   Activity limitations that impact the plan of care are:      Bathing, Dressing, Lifting and reaching.  3) Clinical presentation characteristics are:   Stable/Uncomplicated.  4) Decision-Making    Low complexity using standardized patient assessment instrument and/or measureable assessment of functional outcome.  Cumulative Therapy Evaluation is: Low complexity.    Communication ability:  Patient appears to be able to clearly communicate and understand verbal and written communication and follow directions correctly.  Treatment Explanation - The following has been discussed with the patient:   RX ordered/plan of care  Anticipated outcomes  Possible risks and side effects  This patient would benefit from PT intervention to resume normal activities.   Rehab potential is good.  Frequency:  1 X week, once daily  Duration:  for 6 weeks  Discharge Plan:  Achieve all LTG.  Independent in home treatment program.  Reach maximal therapeutic benefit.      Caregiver Signature/Credentials _____________________________ Date ________      Treating Provider: Roberto  "Jourdan, DPT, SCS, Cert, MDT   I have reviewed and certified the need for these services and plan of treatment while under my care.        PHYSICIAN'S SIGNATURE:  ____________________________________  Date___________                     Sarah Duarte MD    Certification period:  Beginning of Cert date period: 09/27/21 to  End of Cert period date: 12/25/21     Functional Level Progress Report: Please see attached \"Goal Flow sheet for Functional level.\"    ____X____ Continue Services or ________ DC Services                Service dates: From  SOC Date: 09/27/21 date to present                         "

## 2021-09-28 DIAGNOSIS — F41.9 ANXIETY: ICD-10-CM

## 2021-09-29 RX ORDER — SERTRALINE HYDROCHLORIDE 25 MG/1
50 TABLET, FILM COATED ORAL DAILY
Qty: 180 TABLET | Refills: 1 | Status: SHIPPED | OUTPATIENT
Start: 2021-09-29 | End: 2021-12-23

## 2021-09-29 NOTE — TELEPHONE ENCOUNTER
Routing refill request to provider for review/approval because:  Drug interaction warning - HIGH     Drug-Drug: sertraline and traZODone  Additive serotonergic effects may occur during coadministration of selective serotonin reuptake inhibitors (SSRIs) and Serotonergic Non-Opioid CNS Depressants, and the risk of developing serotonin syndrome may be increased.

## 2021-10-04 LAB
MDC_IDC_LEAD_IMPLANT_DT: NORMAL
MDC_IDC_LEAD_IMPLANT_DT: NORMAL
MDC_IDC_LEAD_LOCATION: NORMAL
MDC_IDC_LEAD_LOCATION: NORMAL
MDC_IDC_LEAD_LOCATION_DETAIL_1: NORMAL
MDC_IDC_LEAD_LOCATION_DETAIL_1: NORMAL
MDC_IDC_LEAD_MFG: NORMAL
MDC_IDC_LEAD_MFG: NORMAL
MDC_IDC_LEAD_MODEL: NORMAL
MDC_IDC_LEAD_MODEL: NORMAL
MDC_IDC_LEAD_POLARITY_TYPE: NORMAL
MDC_IDC_LEAD_POLARITY_TYPE: NORMAL
MDC_IDC_LEAD_SERIAL: NORMAL
MDC_IDC_LEAD_SERIAL: NORMAL
MDC_IDC_MSMT_BATTERY_DTM: NORMAL
MDC_IDC_MSMT_BATTERY_REMAINING_LONGEVITY: 30 MO
MDC_IDC_MSMT_BATTERY_REMAINING_PERCENTAGE: 53 %
MDC_IDC_MSMT_BATTERY_STATUS: NORMAL
MDC_IDC_MSMT_LEADCHNL_RA_IMPEDANCE_VALUE: 468 OHM
MDC_IDC_MSMT_LEADCHNL_RA_PACING_THRESHOLD_AMPLITUDE: 0.6 V
MDC_IDC_MSMT_LEADCHNL_RA_PACING_THRESHOLD_PULSEWIDTH: 0.4 MS
MDC_IDC_MSMT_LEADCHNL_RV_IMPEDANCE_VALUE: 410 OHM
MDC_IDC_MSMT_LEADCHNL_RV_LEAD_CHANNEL_STATUS: NORMAL
MDC_IDC_MSMT_LEADCHNL_RV_PACING_THRESHOLD_AMPLITUDE: 1.4 V
MDC_IDC_MSMT_LEADCHNL_RV_PACING_THRESHOLD_PULSEWIDTH: 0.4 MS
MDC_IDC_PG_IMPLANT_DTM: NORMAL
MDC_IDC_PG_MFG: NORMAL
MDC_IDC_PG_MODEL: NORMAL
MDC_IDC_PG_SERIAL: NORMAL
MDC_IDC_PG_TYPE: NORMAL
MDC_IDC_SESS_CLINIC_NAME: NORMAL
MDC_IDC_SESS_DTM: NORMAL
MDC_IDC_SESS_TYPE: NORMAL
MDC_IDC_SET_BRADY_AT_MODE_SWITCH_MODE: NORMAL
MDC_IDC_SET_BRADY_AT_MODE_SWITCH_RATE: 150 {BEATS}/MIN
MDC_IDC_SET_BRADY_LOWRATE: 60 {BEATS}/MIN
MDC_IDC_SET_BRADY_MAX_SENSOR_RATE: 130 {BEATS}/MIN
MDC_IDC_SET_BRADY_MAX_TRACKING_RATE: 130 {BEATS}/MIN
MDC_IDC_SET_BRADY_MODE: NORMAL
MDC_IDC_SET_BRADY_PAV_DELAY_HIGH: 220 MS
MDC_IDC_SET_BRADY_PAV_DELAY_LOW: 250 MS
MDC_IDC_SET_BRADY_SAV_DELAY_HIGH: 220 MS
MDC_IDC_SET_BRADY_SAV_DELAY_LOW: 250 MS
MDC_IDC_SET_LEADCHNL_RA_PACING_AMPLITUDE: 2 V
MDC_IDC_SET_LEADCHNL_RA_PACING_POLARITY: NORMAL
MDC_IDC_SET_LEADCHNL_RA_PACING_PULSEWIDTH: 0.4 MS
MDC_IDC_SET_LEADCHNL_RA_SENSING_ADAPTATION_MODE: NORMAL
MDC_IDC_SET_LEADCHNL_RA_SENSING_POLARITY: NORMAL
MDC_IDC_SET_LEADCHNL_RA_SENSING_SENSITIVITY: 0.5 MV
MDC_IDC_SET_LEADCHNL_RV_PACING_AMPLITUDE: 2.4 V
MDC_IDC_SET_LEADCHNL_RV_PACING_CAPTURE_MODE: NORMAL
MDC_IDC_SET_LEADCHNL_RV_PACING_POLARITY: NORMAL
MDC_IDC_SET_LEADCHNL_RV_PACING_PULSEWIDTH: 0.4 MS
MDC_IDC_SET_LEADCHNL_RV_SENSING_ADAPTATION_MODE: NORMAL
MDC_IDC_SET_LEADCHNL_RV_SENSING_POLARITY: NORMAL
MDC_IDC_SET_LEADCHNL_RV_SENSING_SENSITIVITY: 1 MV
MDC_IDC_SET_ZONE_DETECTION_INTERVAL: 375 MS
MDC_IDC_SET_ZONE_TYPE: NORMAL
MDC_IDC_SET_ZONE_VENDOR_TYPE: NORMAL
MDC_IDC_STAT_AT_BURDEN_PERCENT: 1 %
MDC_IDC_STAT_AT_DTM_END: NORMAL
MDC_IDC_STAT_AT_DTM_START: NORMAL
MDC_IDC_STAT_BRADY_DTM_END: NORMAL
MDC_IDC_STAT_BRADY_DTM_START: NORMAL
MDC_IDC_STAT_BRADY_RA_PERCENT_PACED: 17 %
MDC_IDC_STAT_BRADY_RV_PERCENT_PACED: 0 %
MDC_IDC_STAT_EPISODE_RECENT_COUNT: 0
MDC_IDC_STAT_EPISODE_RECENT_COUNT: 0
MDC_IDC_STAT_EPISODE_RECENT_COUNT: 16
MDC_IDC_STAT_EPISODE_RECENT_COUNT: 6
MDC_IDC_STAT_EPISODE_RECENT_COUNT_DTM_END: NORMAL
MDC_IDC_STAT_EPISODE_RECENT_COUNT_DTM_START: NORMAL
MDC_IDC_STAT_EPISODE_TYPE: NORMAL
MDC_IDC_STAT_EPISODE_VENDOR_TYPE: NORMAL

## 2021-10-11 ENCOUNTER — IMMUNIZATION (OUTPATIENT)
Dept: NURSING | Facility: CLINIC | Age: 81
End: 2021-10-11
Payer: MEDICARE

## 2021-10-11 ENCOUNTER — THERAPY VISIT (OUTPATIENT)
Dept: PHYSICAL THERAPY | Facility: CLINIC | Age: 81
End: 2021-10-11
Attending: NURSE PRACTITIONER
Payer: MEDICARE

## 2021-10-11 DIAGNOSIS — M25.511 CHRONIC PAIN OF BOTH SHOULDERS: Primary | ICD-10-CM

## 2021-10-11 DIAGNOSIS — M25.512 CHRONIC PAIN OF BOTH SHOULDERS: Primary | ICD-10-CM

## 2021-10-11 DIAGNOSIS — G89.29 CHRONIC PAIN OF BOTH SHOULDERS: Primary | ICD-10-CM

## 2021-10-11 PROCEDURE — 97110 THERAPEUTIC EXERCISES: CPT | Mod: GP | Performed by: PHYSICAL THERAPIST

## 2021-10-11 PROCEDURE — 0003A PR COVID VAC PFIZER DIL RECON 30 MCG/0.3 ML IM: CPT

## 2021-10-11 PROCEDURE — 91300 PR COVID VAC PFIZER DIL RECON 30 MCG/0.3 ML IM: CPT

## 2021-10-11 PROCEDURE — 97140 MANUAL THERAPY 1/> REGIONS: CPT | Mod: GP | Performed by: PHYSICAL THERAPIST

## 2021-10-13 ENCOUNTER — MYC MEDICAL ADVICE (OUTPATIENT)
Dept: FAMILY MEDICINE | Facility: CLINIC | Age: 81
End: 2021-10-13

## 2021-10-13 NOTE — TELEPHONE ENCOUNTER
ANTICOAGULATION     Erich Craven is overdue for INR check.      [email or web address] message sent to patient.     Evita Lloyd RN

## 2021-10-18 DIAGNOSIS — I48.91 ATRIAL FIBRILLATION (H): ICD-10-CM

## 2021-10-19 RX ORDER — WARFARIN SODIUM 5 MG/1
TABLET ORAL
Qty: 100 TABLET | Refills: 1 | Status: SHIPPED | OUTPATIENT
Start: 2021-10-19 | End: 2022-07-11

## 2021-10-19 NOTE — TELEPHONE ENCOUNTER
Warfarin sodium 5 mg tablet    Summary: Take 1.5 tablets (7.5 mg) on Fridays and take 1 tablet (5 mg) all other days or as directed by the INR clinic, Disp-100 tablet, R-0, E-Prescribe   Start: 5/21/2021  Ord/Sold: 5/21/2021    LOV 6/21/2021

## 2021-10-20 ENCOUNTER — OFFICE VISIT (OUTPATIENT)
Dept: FAMILY MEDICINE | Facility: CLINIC | Age: 81
End: 2021-10-20
Payer: MEDICARE

## 2021-10-20 ENCOUNTER — ANTICOAGULATION THERAPY VISIT (OUTPATIENT)
Dept: NURSING | Facility: CLINIC | Age: 81
End: 2021-10-20

## 2021-10-20 ENCOUNTER — MYC MEDICAL ADVICE (OUTPATIENT)
Dept: ANESTHESIOLOGY | Facility: CLINIC | Age: 81
End: 2021-10-20

## 2021-10-20 VITALS
RESPIRATION RATE: 17 BRPM | SYSTOLIC BLOOD PRESSURE: 105 MMHG | BODY MASS INDEX: 37.65 KG/M2 | HEIGHT: 72 IN | HEART RATE: 77 BPM | WEIGHT: 278 LBS | DIASTOLIC BLOOD PRESSURE: 67 MMHG | OXYGEN SATURATION: 95 % | TEMPERATURE: 97.7 F

## 2021-10-20 DIAGNOSIS — N18.32 STAGE 3B CHRONIC KIDNEY DISEASE (H): ICD-10-CM

## 2021-10-20 DIAGNOSIS — I25.10 CORONARY ARTERY DISEASE INVOLVING NATIVE HEART WITHOUT ANGINA PECTORIS, UNSPECIFIED VESSEL OR LESION TYPE: ICD-10-CM

## 2021-10-20 DIAGNOSIS — I26.99 PE (PULMONARY THROMBOEMBOLISM) (H): ICD-10-CM

## 2021-10-20 DIAGNOSIS — E78.5 HYPERLIPIDEMIA LDL GOAL <70: ICD-10-CM

## 2021-10-20 DIAGNOSIS — E66.01 OBESITY, CLASS III, BMI 40-49.9 (MORBID OBESITY) (H): ICD-10-CM

## 2021-10-20 DIAGNOSIS — I89.0 LYMPHEDEMA: ICD-10-CM

## 2021-10-20 DIAGNOSIS — E66.01 MORBID OBESITY (H): ICD-10-CM

## 2021-10-20 DIAGNOSIS — E03.9 HYPOTHYROIDISM, UNSPECIFIED TYPE: ICD-10-CM

## 2021-10-20 DIAGNOSIS — F41.9 ANXIETY: ICD-10-CM

## 2021-10-20 DIAGNOSIS — I48.0 PAROXYSMAL A-FIB (H): ICD-10-CM

## 2021-10-20 DIAGNOSIS — Z00.00 ROUTINE HISTORY AND PHYSICAL EXAMINATION OF ADULT: Primary | ICD-10-CM

## 2021-10-20 DIAGNOSIS — I82.4Z9 DEEP VEIN THROMBOSIS (DVT) OF DISTAL VEIN OF LOWER EXTREMITY, UNSPECIFIED CHRONICITY, UNSPECIFIED LATERALITY (H): ICD-10-CM

## 2021-10-20 DIAGNOSIS — Z12.11 COLON CANCER SCREENING: ICD-10-CM

## 2021-10-20 DIAGNOSIS — I10 ESSENTIAL HYPERTENSION: ICD-10-CM

## 2021-10-20 DIAGNOSIS — D64.9 ANEMIA, UNSPECIFIED TYPE: ICD-10-CM

## 2021-10-20 DIAGNOSIS — G47.33 OSA (OBSTRUCTIVE SLEEP APNEA): ICD-10-CM

## 2021-10-20 DIAGNOSIS — R06.02 SHORTNESS OF BREATH: ICD-10-CM

## 2021-10-20 DIAGNOSIS — M19.011 PRIMARY OSTEOARTHRITIS OF RIGHT SHOULDER: ICD-10-CM

## 2021-10-20 DIAGNOSIS — J42 CHRONIC BRONCHITIS, UNSPECIFIED CHRONIC BRONCHITIS TYPE (H): ICD-10-CM

## 2021-10-20 LAB
ERYTHROCYTE [DISTWIDTH] IN BLOOD BY AUTOMATED COUNT: 17 % (ref 10–15)
HCT VFR BLD AUTO: 38.1 % (ref 40–53)
HGB BLD-MCNC: 12.5 G/DL (ref 13.3–17.7)
INR BLD: 4 (ref 0.9–1.1)
MCH RBC QN AUTO: 33.1 PG (ref 26.5–33)
MCHC RBC AUTO-ENTMCNC: 32.8 G/DL (ref 31.5–36.5)
MCV RBC AUTO: 101 FL (ref 78–100)
PLATELET # BLD AUTO: 224 10E3/UL (ref 150–450)
RBC # BLD AUTO: 3.78 10E6/UL (ref 4.4–5.9)
WBC # BLD AUTO: 10 10E3/UL (ref 4–11)

## 2021-10-20 PROCEDURE — 84443 ASSAY THYROID STIM HORMONE: CPT | Performed by: INTERNAL MEDICINE

## 2021-10-20 PROCEDURE — 82274 ASSAY TEST FOR BLOOD FECAL: CPT | Performed by: INTERNAL MEDICINE

## 2021-10-20 PROCEDURE — 80053 COMPREHEN METABOLIC PANEL: CPT | Performed by: INTERNAL MEDICINE

## 2021-10-20 PROCEDURE — G0439 PPPS, SUBSEQ VISIT: HCPCS | Performed by: INTERNAL MEDICINE

## 2021-10-20 PROCEDURE — 93000 ELECTROCARDIOGRAM COMPLETE: CPT | Performed by: INTERNAL MEDICINE

## 2021-10-20 PROCEDURE — 85027 COMPLETE CBC AUTOMATED: CPT | Performed by: INTERNAL MEDICINE

## 2021-10-20 PROCEDURE — 99214 OFFICE O/P EST MOD 30 MIN: CPT | Mod: 25 | Performed by: INTERNAL MEDICINE

## 2021-10-20 PROCEDURE — 82728 ASSAY OF FERRITIN: CPT | Performed by: INTERNAL MEDICINE

## 2021-10-20 PROCEDURE — 36415 COLL VENOUS BLD VENIPUNCTURE: CPT | Performed by: INTERNAL MEDICINE

## 2021-10-20 PROCEDURE — 36416 COLLJ CAPILLARY BLOOD SPEC: CPT | Performed by: INTERNAL MEDICINE

## 2021-10-20 PROCEDURE — 80061 LIPID PANEL: CPT | Performed by: INTERNAL MEDICINE

## 2021-10-20 PROCEDURE — 85610 PROTHROMBIN TIME: CPT | Performed by: INTERNAL MEDICINE

## 2021-10-20 ASSESSMENT — ENCOUNTER SYMPTOMS: ARTHRALGIAS: 1

## 2021-10-20 ASSESSMENT — ACTIVITIES OF DAILY LIVING (ADL): CURRENT_FUNCTION: NO ASSISTANCE NEEDED

## 2021-10-20 ASSESSMENT — MIFFLIN-ST. JEOR: SCORE: 1999

## 2021-10-20 NOTE — PROGRESS NOTES
"SUBJECTIVE:   Erich Craven is a 81 year old male who presents for Preventive Visit.      Patient has been advised of split billing requirements and indicates understanding: Yes   Are you in the first 12 months of your Medicare coverage?  No    Healthy Habits:     In general, how would you rate your overall health?  Good    Frequency of exercise:  4-5 days/week    Duration of exercise:  Less than 15 minutes    Do you usually eat at least 4 servings of fruit and vegetables a day, include whole grains    & fiber and avoid regularly eating high fat or \"junk\" foods?  Yes    Taking medications regularly:  Yes    Medication side effects:  Muscle aches    Ability to successfully perform activities of daily living:  No assistance needed    Home Safety:  No safety concerns identified    Hearing Impairment:  No hearing concerns    In the past 6 months, have you been bothered by leaking of urine?  No    In general, how would you rate your overall mental or emotional health?  Good      PHQ-2 Total Score: 0    Additional concerns today:  No    Do you feel safe in your environment? Yes    Have you ever done Advance Care Planning? (For example, a Health Directive, POLST, or a discussion with a medical provider or your loved ones about your wishes): Yes, advance care planning is on file.      Hearing difficulty , noticed on hearing audiologic exam 6 months  Hearing will help but was not concerned.  Memory is good. Once a while forgetful of a name.    Lives with his dog.    5 children, 3 here, 1 in [place] and one in [place]  Close to them all, middle girl, go to person.  3 miles away     Fall risk  Fallen 2 or more times in the past year?: No  Any fall with injury in the past year?: No    Cognitive Screening   1) Repeat 3 items (Leader, Season, Table)    2) Clock draw: NORMAL  3) 3 item recall: Recalls 3 objects  Results: 3 items recalled: COGNITIVE IMPAIRMENT LESS LIKELY    Mini-CogTM Copyright DAMON Sawant. Licensed by the " author for use in [facility]; reprinted with permission ([email or web address]@[email or web address]). All rights reserved.      Do you have sleep apnea, excessive snoring or daytime drowsiness?: yes, has new BIPAP, not using oxygen, new machine, 1.5 months working good, feels more refreshed, less groggy,     Reviewed and updated as needed this visit by clinical staff  Tobacco  Allergies   Problems             Reviewed and updated as needed this visit by Provider     Problems            Social History     Tobacco Use     Smoking status: Former Smoker     Packs/day: 0.50     Years: 25.00     Pack years: 12.50     Smokeless tobacco: Never Used     Tobacco comment: quit in 1989   Substance Use Topics     Alcohol use: 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; recovering     If you drink alcohol do you typically have >3 drinks per day or >7 drinks per week? No    Alcohol Use 10/20/2021   Prescreen: >3 drinks/day or >7 drinks/week? No   Prescreen: >3 drinks/day or >7 drinks/week? -           Current providers sharing in care for this patient include:     Patient Care Team:  Anne Heath MD as PCP - General (Internal Medicine)  Benny Houston MD as Assigned Musculoskeletal Provider  Kassi Glez, PharmD as Pharmacist (Pharmacist)  Goltz, Bennett Ezra, PA-C as Assigned Neuroscience Provider  Sebastian Holland MD as MD (Urology)  Brent Portillo MD as Assigned Heart and Vascular Provider  Tiffany Suero NP as Assigned Nephrology Provider  Anne Heath MD as Assigned PCP  Swetha Allen MD as MD (Anesthesiology)  Sebastian Holland MD as Assigned Surgical Provider    The following health maintenance items are reviewed in Epic and correct as of today:  Health Maintenance Due   Topic Date Due     HF ACTION PLAN  Never done     DIABETIC FOOT EXAM  Never done     ZOSTER IMMUNIZATION (2 of 3) 08/18/2008     A1C  09/21/2021     EYE EXAM  11/05/2021     Lab work is in process  Labs reviewed in EPIC  BP Readings from  Last 3 Encounters:   10/20/21 105/67   09/23/21 126/72   08/26/21 133/74    Wt Readings from Last 3 Encounters:   10/20/21 126.1 kg (278 lb)   09/23/21 129.3 kg (285 lb)   09/16/21 129.3 kg (285 lb)                  Patient Active Problem List   Diagnosis     Morbid obesity (H)     PE (pulmonary thromboembolism) (H)     HTN (hypertension)     Chronic diastolic heart failure (H)     Dyspnea     Fatigue     RIN (obstructive sleep apnea)     COPD (chronic obstructive pulmonary disease) (H)     Hypothyroidism     Paroxysmal A-fib (H)     HLD (hyperlipidemia)     Lymphedema     (HFpEF) heart failure with preserved ejection fraction (H)     Essential hypertension     Cardiac pacemaker in situ     DVT (deep venous thrombosis) (H)     CAD (coronary artery disease)     Obesity, Class III, BMI 40-49.9 (morbid obesity) (H)     Muscle tension headache     Heart failure with preserved ejection fraction, unspecified HF chronicity (H)     Status post coronary angiogram     Anxiety     Deep vein thrombosis (DVT) of distal vein of lower extremity, unspecified chronicity, unspecified laterality (H)     Diabetes mellitus, type 2 (H)     Chronic kidney disease, stage 3 (H)     Primary osteoarthritis of right shoulder     Chronic right shoulder pain     Chronic pain of both shoulders     Past Surgical History:   Procedure Laterality Date     cardiac stenting       CV RIGHT HEART CATH MEASUREMENTS RECORDED N/A 11/5/2019    Procedure: Right Heart Cath;  Surgeon: Roberto Hernandez MD;  Location:  HEART CARDIAC CATH LAB     ENT SURGERY      tonsillectomy     IMPLANT PACEMAKER       INJECT NERVE BLOCK SUPRASCAPULAR Bilateral 8/26/2021    Procedure: BLOCK, NERVE, SUPRASCAPULAR, Bilateral, under ultrasound guidance;  Surgeon: Swetha Allen MD;  Location: [facility] [place] OR     INJECT NERVE BLOCK SUPRASCAPULAR Bilateral 9/23/2021    Procedure: bilateral suprascapular nerve block;  Surgeon: Swetha Allen MD;  Location: [facility] OR     ORTHOPEDIC SURGERY  Right 2012    knee replacement       Social History     Tobacco Use     Smoking status: Former Smoker     Packs/day: 0.50     Years: 25.00     Pack years: 12.50     Smokeless tobacco: Never Used     Tobacco comment: quit in 1989   Substance Use Topics     Alcohol use: 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; recovering     Family History   Problem Relation Age of Onset     Hypertension Father          Current Outpatient Medications   Medication Sig Dispense Refill     acetaminophen (TYLENOL) 500 MG tablet Take 1,000 mg by mouth 4 times daily        Acetylcarnitine HCl (ACETYL L-CARNITINE) 500 MG CAPS Take 1 capsule by mouth 2 times daily       albuterol (PROAIR HFA/PROVENTIL HFA/VENTOLIN HFA) 108 (90 Base) MCG/ACT inhaler Inhale 2 puffs into the lungs every 4 hours as needed for shortness of breath / dyspnea or wheezing 1 Inhaler 0     aspirin (ASA) 81 MG chewable tablet Take 1 tablet by mouth daily       atorvastatin (LIPITOR) 40 MG tablet TAKE 1 TABLET BY MOUTH EVERY EVENING 90 tablet 2     carvedilol (COREG) 12.5 MG tablet TAKE 1 TABLET BY MOUTH TWICE A  tablet 1     co-enzyme Q-10 100 MG CAPS capsule Take 100 mg by mouth daily       diclofenac (VOLTAREN) 1 % topical gel Apply 1 g topically 2 times daily as needed for moderate pain 50 g 0     diltiazem ER COATED BEADS (CARDIZEM CD/CARTIA XT) 300 MG 24 hr capsule TAKE 1 CAPSULE BY MOUTH EVERY DAY 90 capsule 3     Fluticasone-Umeclidin-Vilanterol (TRELEGY ELLIPTA) 200-62.5-25 MCG/INH oral inhaler Inhale 1 puff into the lungs daily       furosemide (LASIX) 20 MG tablet Take 2 tablets (40 mg) by mouth 2 times daily 360 tablet 3     isosorbide mononitrate (IMDUR) 30 MG 24 hr tablet TAKE 1 TABLET BY MOUTH EVERY DAY 90 tablet 3     levothyroxine (SYNTHROID/LEVOTHROID) 175 MCG tablet Take 1 tablet (175 mcg) by mouth daily Additional 1/2 tablet on Sundays 90 tablet 2     lidocaine (LMX4) 4 % external cream Apply topically as needed for mild pain       lisinopril (ZESTRIL) 40  MG tablet Take 1 tablet (40 mg) by mouth daily 90 tablet 3     metolazone (ZAROXOLYN) 2.5 MG tablet Take 1 tablet (2.5 mg) by mouth 2 times daily 180 tablet 3     Multiple vitamin TABS Take 1 tablet by mouth daily       order for DME Oxygen 2 Li/min  Continuous. At night bled into PAP device. In the daytime, NC with bubbler: requiring portability 1 Device 0     sertraline (ZOLOFT) 25 MG tablet Take 2 tablets (50 mg) by mouth daily 180 tablet 1     spironolactone (ALDACTONE) 25 MG tablet TAKE 1 TABLET (25 MG) BY MOUTH EVERY OTHER DAY 45 tablet 0     tamsulosin (FLOMAX) 0.4 MG capsule Take 1 capsule (0.4 mg) by mouth daily 90 capsule 0     Taurine 500 MG CAPS Take 500 mg by mouth 2 times daily       traZODone (DESYREL) 100 MG tablet Take 100 mg by mouth At Bedtime       warfarin ANTICOAGULANT (COUMADIN) 5 MG tablet Take 1.5 tablets (7.5 mg) on Fridays and take 1 tablet (5 mg) all other days or as directed by the INR clinic 100 tablet 1     Allergies   Allergen Reactions     No Known Allergies      Recent Labs   Lab Test 08/31/21  1401 06/21/21  1454 06/14/21  1455 06/14/21  1455 03/02/21  1405 02/19/21  1528 08/19/20  1346 08/11/20  1343 07/24/20  0954 07/14/20  1532   A1C  --  6.1*  --   --   --  5.8*  --  6.7*  --   --    LDL  --   --   --   --   --  52  --  56  --  36   HDL  --   --   --   --   --  44  --  44  --  42   TRIG  --   --   --   --   --  209*  --  154*  --  238*   ALT  --   --   --   --   --  26  --  49  --  45   CR 1.07 1.57*   < > 1.81*   < > 1.34*   < > 1.36*   < > 0.96   GFRESTIMATED 64 40*   < > 34*   < > 49*   < > 48*   < > 74   GFRESTBLACK  --  47*  --  39*   < > 57*   < > 56*   < > 85   POTASSIUM 4.2 4.1   < > 4.7   < > 5.4*   < > 6.0*   < > 3.0*   TSH  --   --   --  0.71  --   --   --  0.79  --   --     < > = values in this interval not displayed.      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    Had colonoscopy 3 yrs ago wanted to repeat ,  colonoscopy in 2017, no desire to repeat colonoscopy  Had barium enema was non successful      Voiding is good,     Swelling in legs, legs better.      Review of Systems  Constitutional, HEENT, cardiovascular, pulmonary, GI, , musculoskeletal, neuro, skin, endocrine and psych systems are negative, except as otherwise noted.    OBJECTIVE:   /67 (BP Location: Left arm, Patient Position: Chair, Cuff Size: Adult Large)   Pulse 77   Temp 97.7  F (36.5  C) (Temporal)   Resp 17   Ht 1.829 m (6')   Wt 126.1 kg (278 lb)   SpO2 95%   BMI 37.70 kg/m   Estimated body mass index is 37.7 kg/m  as calculated from the following:    Height as of this encounter: 1.829 m (6').    Weight as of this encounter: 126.1 kg (278 lb).  Physical Exam  GENERAL: healthy, alert and no distress  EYES: Eyes grossly normal to inspection, PERRL and conjunctivae and sclerae normal  HENT: ear canals and TM's normal, nose and mouth without ulcers or lesions  NECK: no adenopathy, no asymmetry, masses, or scars and thyroid normal to palpation  RESP: lungs clear to auscultation - no rales, rhonchi or wheezes  CV: regular rate and rhythm, normal S1 S2, no S3 or S4, no murmur, click or rub, no peripheral edema and peripheral pulses strong  ABDOMEN: soft, nontender, no hepatosplenomegaly, no masses and bowel sounds normal  MS: no gross musculoskeletal defects noted, no edema  SKIN: no suspicious lesions or rashes  NEURO: Normal strength and tone, mentation intact and speech normal  PSYCH: mentation  appears normal, affect normal/bright    Diagnostic Test Results:  Labs reviewed in Epic    ASSESSMENT / PLAN:   Erich was seen today for physical.    Diagnoses and all orders for this visit:    Routine history and physical examination of adult    RIN (obstructive sleep apnea)    PE (pulmonary thromboembolism) (H)    Primary osteoarthritis of right shoulder    Obesity, Class III, BMI 40-49.9 (morbid obesity) (H)    Morbid obesity (H)    Lymphedema    Hypothyroidism, unspecified type  -     TSH with free T4 reflex    Essential hypertension  -     Comprehensive metabolic panel (BMP + Alb, Alk Phos, ALT, AST, Total. Bili, TP)  -     CBC with platelets; Future    Shortness of breath    Chronic bronchitis, unspecified chronic bronchitis type (H)    Stage 3b chronic kidney disease (H)  -     Comprehensive metabolic panel (BMP + Alb, Alk Phos, ALT, AST, Total. Bili, TP)  -     CBC with platelets; Future    Coronary artery disease involving native heart without angina pectoris, unspecified vessel or lesion type  -     EKG 12-lead complete w/read - Clinics    Anxiety    Colon cancer screening  -     Fecal colorectal cancer screen (FIT); Future    Hyperlipidemia LDL goal <70  -     Lipid panel reflex to direct LDL Fasting    Anemia, unspecified type  -     Ferritin    requesting tramadol- patient follows with pain clinic.  He has been on trazodone is helping with his pain to sleep.  Lymphedema is better improved is on Lasix and Zaroxolyn.  He follows with cardiology.  He had a nuclear stress test that showed low risk.  He has a permanent pacemaker.  He is on chronic anticoagulation follows with the Coumadin clinic.  He had an eye exam of 4 months ago and is due for follow-up, he had cataract surgery several years ago.  He had seen dermatology recently had a lesion on his the tip of the right earlobe that was treated.  He gets skin checks through dermatology.  He had a colonoscopy back in 2017 and a barium enema that were  unsuccessful and he was due for repeat, he is okay with doing stool fit test , he does NOT want a repeat any colonoscopies.  His urine seems to be fine, no problems with voiding.  No problems with bowel movement, no constipation or diarrhea.  No recent or recurrent falls.  He continues to struggle with right shoulder pain.  He has stopped Tylenol 3 it makes his stomach upset.  His has occasional dizziness, he is on 2 diuretics we will check labs today including, thyroid test, lipid CMP.  Continue follow-up with pulmonary, his dyspnea is much better controlled on Trelegy inhaler and albuterol as needed    Patient has been advised of split billing requirements and indicates understanding: Yes  COUNSELING:  Reviewed preventive health counseling, as reflected in patient instructions       Regular exercise       Healthy diet/nutrition       Vision screening       Hearing screening       Dental care       Bladder control       Fall risk prevention       Alcohol Use        The ASCVD Risk score (Champion CHERELLE Jr., et al., 2013) failed to calculate for the following reasons:    The 2013 ASCVD risk score is only valid for ages 40 to 79    Estimated body mass index is 37.7 kg/m  as calculated from the following:    Height as of this encounter: 1.829 m (6').    Weight as of this encounter: 126.1 kg (278 lb).    Weight management plan: Discussed healthy diet and exercise guidelines    He reports that he has quit smoking. He has a 12.50 pack-year smoking history. He has never used smokeless tobacco.      Appropriate preventive services were discussed with this patient, including applicable screening as appropriate for cardiovascular disease, diabetes, osteopenia/osteoporosis, and glaucoma.  As appropriate for age/gender, discussed screening for colorectal cancer, prostate cancer, breast cancer, and cervical cancer. Checklist reviewing preventive services available has been given to the patient.    Reviewed patients plan of care and  provided an AVS. The Basic Care Plan (routine screening as documented in Health Maintenance) for Erich meets the Care Plan requirement. This Care Plan has been established and reviewed with the Patient.    Counseling Resources:  ATP IV Guidelines  Pooled Cohorts Equation Calculator  Breast Cancer Risk Calculator  Breast Cancer: Medication to Reduce Risk  FRAX Risk Assessment  ICSI Preventive Guidelines  Dietary Guidelines for Americans, 2010  [employer]'s MyPlate  ASA Prophylaxis  Lung CA Screening    Anne Heath MD  [facility]  Total time spent was 40 minutes physical exam review of records and recommendations  Identified Health Risks:

## 2021-10-20 NOTE — PROGRESS NOTES
ANTICOAGULATION MANAGEMENT     Erich Craven 81 year old male is on warfarin with supratherapeutic INR result. (Goal INR 2.0-3.0)    Recent labs: (last 7 days)     10/20/21  1028   INR 4.0*       ASSESSMENT     Source(s): Chart Review and Patient/Caregiver Call       Warfarin doses taken: Warfarin taken differently, but did not change total weekly dose    Diet: No new diet changes identified    New illness, injury, or hospitalization: Yes: Pt reports bilateral shoulder pain. Pt had a procedure on 9/23/21 which only helped the pain for 2 weeks. Now the pain is worse. Pt reports that he will most likely need shoulder surgery. Pt taking tylenol for pain. Pt reports that the pain clinic started him on tramadol and trazodone for sleep. Pt states that  is concerned about this combination. Tramadol can increase bleeding risk.    Medication/supplement changes: See above    Signs or symptoms of bleeding or clotting: No    Previous INR: Therapeutic last 2(+) visits    Additional findings: Pt reports that he has been taking 7.5 mg on Wednesdays instead of Fridays. Pt already took 7.5 mg of warfarin today 10/20/21.     PLAN     Recommended plan for ongoing change(s) affecting INR     Dosing Instructions: Hold dose then Decrease your warfarin dose (6.7% change) with next INR in 2 weeks       Summary  As of 10/20/2021    Full warfarin instructions:  10/20: 7.5 mg; 10/21: Hold; 10/22: 2.5 mg; Otherwise 5 mg every day   Next INR check:  11/3/2021             Telephone call with Erich who agrees to plan and repeated back plan correctly    Lab visit scheduled    Education provided: Importance of notifying clinic for changes in medications; a sooner lab recheck maybe needed. and Contact [phone]  with any changes, questions or concerns.     Plan made per [facility] anticoagulation protocol    Delia Jang, RN  Anticoagulation Clinic  10/20/2021    _______________________________________________________________________      Anticoagulation Episode Summary     Current INR goal:  2.0-3.0   TTR:  67.9 % (1 y)   Target end date:  Indefinite   Send INR reminders to:  SERENITY BECERRA    Indications    PE (pulmonary thromboembolism) (H) [I26.99]  Paroxysmal A-fib (H) [I48.0]  Deep vein thrombosis (DVT) of distal vein of lower extremity  unspecified chronicity  unspecified laterality (H) [I82.4Z9]           Comments:           Anticoagulation Care Providers     Provider Role Specialty Phone number    Dontrell Gómez MD Referring Internal Medicine [phone]    Anne Heath MD Referring Internal Medicine [phone]

## 2021-10-21 DIAGNOSIS — N17.9 ACUTE KIDNEY INJURY (H): Primary | ICD-10-CM

## 2021-10-21 LAB
ALBUMIN SERPL-MCNC: 3.6 G/DL (ref 3.4–5)
ALP SERPL-CCNC: 108 U/L (ref 40–150)
ALT SERPL W P-5'-P-CCNC: 20 U/L (ref 0–70)
ANION GAP SERPL CALCULATED.3IONS-SCNC: 11 MMOL/L (ref 3–14)
AST SERPL W P-5'-P-CCNC: 11 U/L (ref 0–45)
BILIRUB SERPL-MCNC: 0.4 MG/DL (ref 0.2–1.3)
BUN SERPL-MCNC: 68 MG/DL (ref 7–30)
CALCIUM SERPL-MCNC: 9.3 MG/DL (ref 8.5–10.1)
CHLORIDE BLD-SCNC: 101 MMOL/L (ref 94–109)
CHOLEST SERPL-MCNC: 126 MG/DL
CO2 SERPL-SCNC: 23 MMOL/L (ref 20–32)
CREAT SERPL-MCNC: 1.85 MG/DL (ref 0.66–1.25)
FASTING STATUS PATIENT QL REPORTED: YES
FERRITIN SERPL-MCNC: 78 NG/ML (ref 26–388)
GFR SERPL CREATININE-BSD FRML MDRD: 33 ML/MIN/1.73M2
GLUCOSE BLD-MCNC: 92 MG/DL (ref 70–99)
HDLC SERPL-MCNC: 48 MG/DL
LDLC SERPL CALC-MCNC: 64 MG/DL
NONHDLC SERPL-MCNC: 78 MG/DL
POTASSIUM BLD-SCNC: 5.2 MMOL/L (ref 3.4–5.3)
PROT SERPL-MCNC: 7.3 G/DL (ref 6.8–8.8)
SODIUM SERPL-SCNC: 135 MMOL/L (ref 133–144)
TRIGL SERPL-MCNC: 70 MG/DL
TSH SERPL DL<=0.005 MIU/L-ACNC: 0.5 MU/L (ref 0.4–4)

## 2021-10-22 ENCOUNTER — MYC MEDICAL ADVICE (OUTPATIENT)
Dept: FAMILY MEDICINE | Facility: CLINIC | Age: 81
End: 2021-10-22
Payer: MEDICARE

## 2021-10-22 ENCOUNTER — TELEPHONE (OUTPATIENT)
Dept: FAMILY MEDICINE | Facility: CLINIC | Age: 81
End: 2021-10-22

## 2021-10-22 ENCOUNTER — TELEPHONE (OUTPATIENT)
Dept: ANESTHESIOLOGY | Facility: CLINIC | Age: 81
End: 2021-10-22

## 2021-10-22 DIAGNOSIS — M25.511 CHRONIC PAIN OF BOTH SHOULDERS: Primary | ICD-10-CM

## 2021-10-22 DIAGNOSIS — G89.29 CHRONIC PAIN OF BOTH SHOULDERS: Primary | ICD-10-CM

## 2021-10-22 DIAGNOSIS — M25.512 CHRONIC PAIN OF BOTH SHOULDERS: Primary | ICD-10-CM

## 2021-10-22 RX ORDER — TRAMADOL HYDROCHLORIDE 50 MG/1
50 TABLET ORAL EVERY 6 HOURS PRN
Qty: 60 TABLET | Refills: 0 | Status: SHIPPED | OUTPATIENT
Start: 2021-10-22 | End: 2021-10-25

## 2021-10-22 NOTE — TELEPHONE ENCOUNTER
This provider will not prescribe tramadol, follow up with pain clinic for their recommendations, they do prescribe oral medications if necessary, . Advised patient, I do not do long term pain management    Dr Heath

## 2021-10-22 NOTE — RESULT ENCOUNTER NOTE
Please call patient with below recommendations:Jaun-reviewed your lab,Cholesterol panel shows all numbers are at goal including LDL HDL and triglyceride , congratulations,Thyroid test called TSH is low normal at 0.5, I would suggest that you do not take additional half tablet on Sundays, you stay on 1 tablet of levothyroxine once a day every day of the week.  Chemistry shows normal electrolytes, potassium is on the high side at 5.2, there is significant worsening of your kidney function test drop of GFR to 33 from 64 which is concerning creatinine is 1.85 up from 1.07 and marked increase of BUN please keep well-hydrated increase fluid intake, calcium level is normal,  Liver enzymes AST ALT are normal, total protein albumin is normal,  Ferritin which is iron stores in the body is normal at 78.  I would suggest that you decrease your lisinopril to half tablet once a day that is half of the 40 mg dose.I suggest that you decrease the Lasix to 2 tablets in the morning and 1 tablet in the evening, instead of 2 tablets twice a day..Please schedule telephone visit follow-up to discuss these changes in medications.  Please increase your fluid/water intake and there is an element of prerenal due to lack of fluids and diuretic effect.  Repeat chemistry panel in 1 week to check on the kidney function test.  Please continue to check your blood pressure and call us with readings.  Any further questions please let me know  Dr. Heath

## 2021-10-22 NOTE — TELEPHONE ENCOUNTER
RN called patient to discuss MyChart message and Krupa Duarte's response with recommendations. Writer discussed with patient to follow up with PCP regarding his questions/concerns with Trazodone, as rKupa does not prescribe this medication for the patient. Patient verbalized understanding. Patient did reports he has been taking Tramadol and only has 3 left, and is requesting a refill on this. Writer recommended patient schedule a follow up visit with Krupa or Dr. Allen to address any medication questions and to discuss his pain management. Patient in agreement. Writer scheduled patient for 11/5/21.    Linda Serrano RN

## 2021-10-22 NOTE — TELEPHONE ENCOUNTER
Anne Heath MD  [facility] Triage  Please call patient with below recommendations:  Jaun-reviewed your lab,  Cholesterol panel shows all numbers are at goal including LDL HDL and triglyceride , congratulations,  Thyroid test called TSH is low normal at 0.5, I would suggest that you do not take additional half tablet on Sundays, you stay on 1 tablet of levothyroxine once a day every day of the week.   Chemistry shows normal electrolytes, potassium is on the high side at 5.2, there is significant worsening of your kidney function test drop of GFR to 33 from 64 which is concerning creatinine is 1.85 up from 1.07 and marked increase of BUN please keep well-hydrated increase fluid intake, calcium level is normal,   Liver enzymes AST ALT are normal, total protein albumin is normal,   Ferritin which is iron stores in the body is normal at 78.   I would suggest that you decrease your lisinopril to half tablet once a day that is half of the 40 mg dose.  I suggest that you decrease the Lasix to 2 tablets in the morning and 1 tablet in the evening, instead of 2 tablets twice a day..  Please schedule telephone visit follow-up to discuss these changes in medications.   Please increase your fluid/water intake and there is an element of prerenal due to lack of fluids and diuretic effect.   Repeat chemistry panel in 1 week to check on the kidney function test.  Please continue to check your blood pressure and call us with readings.   Any further questions please let me know   Dr. Heath

## 2021-10-22 NOTE — TELEPHONE ENCOUNTER
"Left message on answering machine for patient to call back.      Please forward to Alesha RN Triage \"green calls\" line (Rajni ERNST RN) at [phone] if pt has further questions that need to be addressed by an RN.       Lanny GARNICA RN,BSN        "

## 2021-10-22 NOTE — TELEPHONE ENCOUNTER
PCP, Patient is still having pain in shoulders. He has injections from the pain clinic. He will need surgery.     All information listed below was communicated to the patient.     Pain clinic will not prescribe the Tramadol.   He states the Klonopin did help in the past. He tries  to take very little.     PCP, please advise.     Tiffany Singh RN  [facility]

## 2021-10-22 NOTE — TELEPHONE ENCOUNTER
Left voice message advising pt to review my chart messages and call triage back if further questions.

## 2021-10-22 NOTE — TELEPHONE ENCOUNTER
Noted.     I do not prescribe him trazodone so I would defer this question to his primary care provider.    It is my understanding he is already taking Tramadol as needed for pain. He is inquiring about a dose increase? I think his concerns might be best addressed with a follow up visit- either with Dr. Allen or myself.    VAMSI Zuluaga CNP  [facility]

## 2021-10-22 NOTE — TELEPHONE ENCOUNTER
Signed Prescriptions:                        Disp   Refills    traMADol (ULTRAM) 50 MG tablet             60 tab*0        Sig: Take 1 tablet (50 mg) by mouth every 6 hours as           needed for severe pain  Authorizing Provider: LAURA SANTIZO      Reviewed [place] Prescription Monitoring Program, appears appropriate.       VAMSI Zuluaga [facility]

## 2021-10-22 NOTE — TELEPHONE ENCOUNTER
Refill request    Medication: traMADol (ULTRAM) 50 MG tablet      [place] Checked: Yes    Last refilled 9/13/21 for 60 tablets      Last clinic appointment: 9/10/21  Next clinic appointment: 11/5/21    Last Drug Screen Collected: 9/20/21   Controlled Substance Agreement signed:  9/20/21      1. Jaun reports very good pain benefit with Tramadol, was more active, and had minimal side effects with this medication. He also agrees to discontinue clonazepam as he doesn't feel it is necessary. Okay to restart Tramadol 25mg BID as needed for severe pain. Nursing will contact him to set up an appointment to complete requirements for ongoing prescribing.   2. As discussed, there is a dangerous interaction between clonazepam and Tramadol, especially with a hx of COPD. To minimize the risk, I recommended discontinuing this medication and he is in agreement.      Patient reports today (10/22) that he has 3 pills left.      Preferred pharmacy:      [facility] [address] [place] [address]

## 2021-10-25 DIAGNOSIS — N18.32 STAGE 3B CHRONIC KIDNEY DISEASE (H): Primary | ICD-10-CM

## 2021-10-25 NOTE — PROGRESS NOTES
Tiffany Suero, Sherin Mcduffie; Angelina Corona, MARIA DE JESUS Duff this patient needs a lab appt prior to our visit on Wed. He just had a med change by his PCP on 10/20/21 for increased creat so would like to see how those changes effected his renal function.     Angelina can you order a renal panel, UCPR, Hgb   Thx   D

## 2021-10-26 LAB — HEMOCCULT STL QL IA: NEGATIVE

## 2021-10-27 ENCOUNTER — TELEPHONE (OUTPATIENT)
Dept: NEPHROLOGY | Facility: CLINIC | Age: 81
End: 2021-10-27

## 2021-10-28 ENCOUNTER — LAB (OUTPATIENT)
Dept: LAB | Facility: CLINIC | Age: 81
End: 2021-10-28
Payer: MEDICARE

## 2021-10-28 DIAGNOSIS — N18.32 STAGE 3B CHRONIC KIDNEY DISEASE (H): ICD-10-CM

## 2021-10-28 DIAGNOSIS — D63.1 ANEMIA IN STAGE 3A CHRONIC KIDNEY DISEASE (H): ICD-10-CM

## 2021-10-28 DIAGNOSIS — E11.9 DIABETES MELLITUS, TYPE 2 (H): Primary | ICD-10-CM

## 2021-10-28 DIAGNOSIS — N18.31 ANEMIA IN STAGE 3A CHRONIC KIDNEY DISEASE (H): ICD-10-CM

## 2021-10-28 DIAGNOSIS — N17.9 ACUTE KIDNEY INJURY (H): ICD-10-CM

## 2021-10-28 LAB
CREAT UR-MCNC: 32 MG/DL
HBA1C MFR BLD: 5.9 % (ref 0–5.6)
HGB BLD-MCNC: 12.6 G/DL (ref 13.3–17.7)
PROT UR-MCNC: 0.14 G/L
PROT/CREAT 24H UR: 0.44 G/G CR (ref 0–0.2)

## 2021-10-28 PROCEDURE — 83550 IRON BINDING TEST: CPT

## 2021-10-28 PROCEDURE — 36415 COLL VENOUS BLD VENIPUNCTURE: CPT

## 2021-10-28 PROCEDURE — 82728 ASSAY OF FERRITIN: CPT

## 2021-10-28 PROCEDURE — 80069 RENAL FUNCTION PANEL: CPT

## 2021-10-28 PROCEDURE — 85018 HEMOGLOBIN: CPT

## 2021-10-28 PROCEDURE — 83036 HEMOGLOBIN GLYCOSYLATED A1C: CPT

## 2021-10-28 PROCEDURE — 84156 ASSAY OF PROTEIN URINE: CPT

## 2021-10-29 ENCOUNTER — VIRTUAL VISIT (OUTPATIENT)
Dept: NEPHROLOGY | Facility: CLINIC | Age: 81
End: 2021-10-29
Payer: MEDICARE

## 2021-10-29 DIAGNOSIS — N18.32 STAGE 3B CHRONIC KIDNEY DISEASE (H): Primary | ICD-10-CM

## 2021-10-29 LAB
ALBUMIN SERPL-MCNC: 3.4 G/DL (ref 3.4–5)
ANION GAP SERPL CALCULATED.3IONS-SCNC: 5 MMOL/L (ref 3–14)
BUN SERPL-MCNC: 47 MG/DL (ref 7–30)
CALCIUM SERPL-MCNC: 9 MG/DL (ref 8.5–10.1)
CHLORIDE BLD-SCNC: 102 MMOL/L (ref 94–109)
CO2 SERPL-SCNC: 25 MMOL/L (ref 20–32)
CREAT SERPL-MCNC: 1.39 MG/DL (ref 0.66–1.25)
FERRITIN SERPL-MCNC: 81 NG/ML (ref 26–388)
GFR SERPL CREATININE-BSD FRML MDRD: 47 ML/MIN/1.73M2
GLUCOSE BLD-MCNC: 95 MG/DL (ref 70–99)
IRON SATN MFR SERPL: 19 % (ref 15–46)
IRON SERPL-MCNC: 60 UG/DL (ref 35–180)
PHOSPHATE SERPL-MCNC: 3.9 MG/DL (ref 2.5–4.5)
POTASSIUM BLD-SCNC: 5.2 MMOL/L (ref 3.4–5.3)
SODIUM SERPL-SCNC: 132 MMOL/L (ref 133–144)
TIBC SERPL-MCNC: 311 UG/DL (ref 240–430)

## 2021-10-29 NOTE — PROGRESS NOTES
Jaun is a 81 year old who is being evaluated via a billable video visit.      I spoke with patient. He has not checked his b/ps since med change from PCP on 10/20. His labs from yesterday still not available.  He was agreeable to rescheduling appt to 11/2 at 10 AM. He will check b/p, weights daily until our visit and labs will be available at that time.   Tiffany ALEGRE, CMA

## 2021-10-29 NOTE — LETTER
10/29/2021       RE: Erich Craven  [address]  [place] [address]     Dear Colleague,    Thank you for referring your patient, Erich Craven, to the [facility] [place] [facility]. Please see a copy of my visit note below.    Jaun is a 81 year old who is being evaluated via a billable video visit.      I spoke with patient. He has not checked his b/ps since med change from PCP on 10/20. His labs from yesterday still not available.  He was agreeable to rescheduling appt to 11/2 at 10 AM. He will check b/p, weights daily until our visit and labs will be available at that time.   Tiffany ALEGRE CMA      Again, thank you for allowing me to participate in the care of your patient.      Sincerely,    Tiffany Suero NP

## 2021-11-02 ENCOUNTER — VIRTUAL VISIT (OUTPATIENT)
Dept: NEPHROLOGY | Facility: CLINIC | Age: 81
End: 2021-11-02
Payer: MEDICARE

## 2021-11-02 DIAGNOSIS — I50.32 CHRONIC DIASTOLIC HEART FAILURE (H): ICD-10-CM

## 2021-11-02 DIAGNOSIS — I10 ESSENTIAL HYPERTENSION: ICD-10-CM

## 2021-11-02 DIAGNOSIS — D63.1 ANEMIA IN STAGE 3A CHRONIC KIDNEY DISEASE (H): ICD-10-CM

## 2021-11-02 DIAGNOSIS — N18.31 ANEMIA IN STAGE 3A CHRONIC KIDNEY DISEASE (H): ICD-10-CM

## 2021-11-02 DIAGNOSIS — N18.31 CHRONIC KIDNEY DISEASE, STAGE 3A (H): Primary | ICD-10-CM

## 2021-11-02 PROCEDURE — G0463 HOSPITAL OUTPT CLINIC VISIT: HCPCS | Mod: PN,RTG

## 2021-11-02 PROCEDURE — 99214 OFFICE O/P EST MOD 30 MIN: CPT | Mod: 95

## 2021-11-02 RX ORDER — FUROSEMIDE 20 MG
TABLET ORAL
Qty: 360 TABLET | Refills: 3 | Status: SHIPPED | OUTPATIENT
Start: 2021-11-02 | End: 2022-09-26

## 2021-11-02 RX ORDER — LISINOPRIL 20 MG/1
20 TABLET ORAL DAILY
Qty: 90 TABLET | Refills: 3 | Status: SHIPPED | OUTPATIENT
Start: 2021-11-02 | End: 2022-10-04

## 2021-11-02 ASSESSMENT — PAIN SCALES - GENERAL: PAINLEVEL: NO PAIN (0)

## 2021-11-02 NOTE — PROGRESS NOTES
Nephrology Video Visit 11/2/21    Assessment and Plan:    1. CKD3a/b w/mild proteinuria - Stable. Creat 1.3, eGFR 47 ml/mn, UPCR 0.4 g/gCr  Baseline creat mid to upper 1's since 8/20 w/recurrent DONA   - Creat bumped up to 1.8 on 10/20/21. He was seen by PCP that day and b/p was soft, creat elevated, K borderline. Lisinopril was decreased to 20 mg every day ( from 40 mg) and Lasix decreased to 60 mg every day ( from 80 mg)   - Etiology of his CKD likely HTN. Etiology of his recent DONA was likely over diuresis   - Renal US normal   - Does not use NSAIDs   - Blood pressure at goal but may have been running low prior to the 10/20 visit   - DM well controlled with A1c of 5.9 % ( 10/21)   - He is on statin for CV risk reduction    2. CV/HTN/Pafib, Diastolic HF - Currently well controlled. Has very mild end of day edema. No dyspnea. Weight 275# ,down from 292# in 6/21. Home b/ps now in the teens - 130/.  Has not brought in his b/p device to be checked for accuracy. I suspect his b/p was running low prior to med change by PCP on 10/20 given normal b/ps now on half the dose of Lisinopril and decreased dose of Lasix.   Current regimen:    Coreg 12.5 mg bid   Lasix 40 mg AM, 20 mg PM   Spironolactone 25 mg every other day   Lisinopril 20 mg every day   Diltiazem  mg every day   Imdur 30 mg every day   Metolazone 2.5 mg bid    - Continue current regimen for now but would consider decreasing his Metolazone given mild hyponatremia if blood pressures dip with ongoing weight loss    3. Electrolytes - Patient with mild hyponatremia ( Na 132) and borderline hyperkalemia ( K 5.2)    - He should not need a fluid restriction given adequate GFR   - He is on Metolazone which can cause hyponatremia so will need to trend   - He has borderline hyperk despite reduction in ACE. Will need to monitor given that he is also on Spironolactone    4. Acid base - No acute concerns. Bicarb 25    5. BMD - Ca 9.0 Phos 3.9 albumin 3.9   Vit D 34,   ( 6/21)   Not currently on Vit D    6. BPH - Controlled with Flomax    7. Anemia - Hgb 12.6   - Iron studies 10/21: Ferritin 81 Fe 60 IS 19   - He does not require iron/LEIGH at this time    8. Disposition - RTC 3 months for follow up w/labs prior    Assessment and plan was discussed with patient and he voiced his understanding and agreement.    Reason for Visit:  CKD/HTN    HPI:  Mr Craven is an 80 yo male with CKD3b, COPD, RIN on BiPAP, Diastolic HF, SSS s/p PPM, CAD, P-Afib, HTN, BPH, h/o Pulmonary embolism, present today for CKD follow up. Last seen in clinic by me on 6/23/21  Baseline creat mid to upper 1's    ROS:   No complaints  Working with pain clinic regarding shoulder pain which is slowly improving  Reports mild end of day edema.   Using Bipap at [facility] for his RIN  Reviewed medication changes following visit with his PCP on 10/20/21. His blood pressure in the office was 105/, creat was up to 1.8. His Lisinopril was decreased to 20 mg every day and Lasix decreased to 60 mg every day.   Home blood pressures following those changes are now in the teens - 130/   Has completed his COVID vax series and Flu vax  Denies CP/dyspnea  No abdominal concerns  Voiding w/o complication. On Flomax  Appetite is good. Has been a vegetarian for years  Walking with his dog regularly    Chronic Health Problems:    CKD3b  COPD  IRN on CPAP  Diastolic HF  SSS s/p PPM  CAD  P-Afib  h/o Pulmonary embolism  HTN  Hypothyroidism  Anxiety  BPH  HLD    Family Hx:   Family History   Problem Relation Age of Onset     Hypertension Father      Personal Hx:   , lives with his puppy Clayton. NS    Allergies:  Allergies   Allergen Reactions     No Known Allergies        Medications:  Current Outpatient Medications   Medication Sig     acetaminophen (TYLENOL) 500 MG tablet Take 1,000 mg by mouth 4 times daily      Acetylcarnitine HCl (ACETYL L-CARNITINE) 500 MG CAPS Take 1 capsule by mouth 2 times daily     albuterol (PROAIR  HFA/PROVENTIL HFA/VENTOLIN HFA) 108 (90 Base) MCG/ACT inhaler Inhale 2 puffs into the lungs every 4 hours as needed for shortness of breath / dyspnea or wheezing     aspirin (ASA) 81 MG chewable tablet Take 1 tablet by mouth daily     atorvastatin (LIPITOR) 40 MG tablet TAKE 1 TABLET BY MOUTH EVERY EVENING     carvedilol (COREG) 12.5 MG tablet TAKE 1 TABLET BY MOUTH TWICE A DAY     co-enzyme Q-10 100 MG CAPS capsule Take 100 mg by mouth daily     diclofenac (VOLTAREN) 1 % topical gel Apply 1 g topically 2 times daily as needed for moderate pain     diltiazem ER COATED BEADS (CARDIZEM CD/CARTIA XT) 300 MG 24 hr capsule TAKE 1 CAPSULE BY MOUTH EVERY DAY     Fluticasone-Umeclidin-Vilanterol (TRELEGY ELLIPTA) 200-62.5-25 MCG/INH oral inhaler Inhale 1 puff into the lungs daily     furosemide (LASIX) 20 MG tablet Take 2 tabs in morning and 1 tab in the sfternoon     isosorbide mononitrate (IMDUR) 30 MG 24 hr tablet TAKE 1 TABLET BY MOUTH EVERY DAY     levothyroxine (SYNTHROID/LEVOTHROID) 175 MCG tablet Take 1 tablet (175 mcg) by mouth daily Additional 1/2 tablet on Sundays     lidocaine (LMX4) 4 % external cream Apply topically as needed for mild pain     lisinopril (ZESTRIL) 20 MG tablet Take 1 tablet (20 mg) by mouth daily     metolazone (ZAROXOLYN) 2.5 MG tablet Take 1 tablet (2.5 mg) by mouth 2 times daily     Multiple vitamin TABS Take 1 tablet by mouth daily     order for DME Oxygen 2 Li/min  Continuous. At night bled into PAP device. In the daytime, NC with bubbler: requiring portability     sertraline (ZOLOFT) 25 MG tablet Take 2 tablets (50 mg) by mouth daily     spironolactone (ALDACTONE) 25 MG tablet TAKE 1 TABLET (25 MG) BY MOUTH EVERY OTHER DAY     tamsulosin (FLOMAX) 0.4 MG capsule Take 1 capsule (0.4 mg) by mouth daily     Taurine 500 MG CAPS Take 500 mg by mouth 2 times daily     warfarin ANTICOAGULANT (COUMADIN) 5 MG tablet Take 1.5 tablets (7.5 mg) on Fridays and take 1 tablet (5 mg) all other days or as  directed by the INR clinic     No current facility-administered medications for this visit.      Vitals:  B/P 134/66, Wt 275#    Exam:  GEN: Pleasant male in NAD  RESP: Breathing is non labored. Speaking in full sentences  NEURO: A/O  PSYCH: Bright affect, engaging     LABS:   CMP  Recent Labs   Lab Test 10/28/21  1118 10/20/21  1022 09/23/21  1349 08/31/21  1401 06/21/21  1454 06/14/21  1455 06/14/21  1455 04/21/21  0931 04/21/21  0931 03/02/21  1405 03/02/21  1405   * 135  --  137 138   < > 137   < > 138   < > 136   POTASSIUM 5.2 5.2  --  4.2 4.1   < > 4.7   < > 3.7   < > 4.5   CHLORIDE 102 101  --  103 103   < > 105   < > 99   < > 104   CO2 25 23  --  28 29   < > 28   < > 39*   < > 28   ANIONGAP 5 11  --  6 6   < > 4   < > <1*   < > 4   GLC 95 92 89 127* 100*   < > 108*   < > 104*   < > 93   BUN 47* 68*  --  45* 64*   < > 62*   < > 44*   < > 57*   CR 1.39* 1.85*  --  1.07 1.57*   < > 1.81*   < > 1.02   < > 1.42*   GFRESTIMATED 47* 33*  --  64 40*   < > 34*   < > 68   < > 46*   GFRESTBLACK  --   --   --   --  47*  --  39*  --  79  --  53*   LAWSON 9.0 9.3  --  9.0 9.4   < > 9.2   < > 9.2   < > 9.6    < > = values in this interval not displayed.     Recent Labs   Lab Test 10/20/21  1022 02/19/21  1528 08/11/20  1343 07/14/20  1532 01/22/20  1216 01/22/20  1216   BILITOTAL 0.4 0.4 0.5  --   --  0.5   ALKPHOS 108 107 104  --   --  112   ALT 20 26 49 45   < > 45   AST 11 14 31  --   --  25    < > = values in this interval not displayed.     CBC  Recent Labs   Lab Test 10/28/21  1118 10/20/21  1022 06/14/21  1455 03/24/21  1805 03/04/21  1932 03/04/21 1932   HGB 12.6* 12.5* 12.2* 11.9*   < > 11.4*   WBC  --  10.0 10.2 10.4  --  10.4   RBC  --  3.78* 3.68* 3.65*  --  3.49*   HCT  --  38.1* 36.7* 37.3*  --  36.1*   MCV  --  101* 100 102*  --  103*   MCH  --  33.1* 33.2* 32.6  --  32.7   MCHC  --  32.8 33.2 31.9  --  31.6   RDW  --  17.0* 15.6* 15.8*  --  15.7*   PLT  --  224 195 208  --  185    < > = values in this  interval not displayed.     URINE STUDIES  Recent Labs   Lab Test 06/14/21  1516 04/21/21  0853 12/21/20  1219 12/14/20  1706 08/11/20  1344 08/11/20  1344   COLOR Yellow Yellow Yellow Yellow   < > Yellow   APPEARANCE Cloudy Clear Clear Clear   < > Clear   URINEGLC Negative Negative Negative Negative   < > Negative   URINEBILI Negative Negative Negative Negative   < > Negative   URINEKETONE Negative Negative Negative Negative   < > Negative   SG 1.020 1.015 1.015 1.015   < > 1.020   UBLD Small* Negative Negative Trace*   < > Negative   URINEPH 5.0 7.0 5.0 6.0   < > 6.5   PROTEIN 30* Negative Negative 30*   < > Negative   UROBILINOGEN 0.2 0.2 0.2 0.2   < > 0.2   NITRITE Negative Negative Negative Negative   < > Negative   LEUKEST Moderate* Negative Small* Small*   < > Small*   RBCU Unable to quantitate other elements due to packed WBC's. *  --  O - 2 2-5*  --  2-5*   WBCU >100*  --  10-25* 25-50*  --  25-50*    < > = values in this interval not displayed.     Recent Labs   Lab Test 10/28/21  1118 06/14/21  1516   UTPG 0.44* 0.38*     PTH  Recent Labs   Lab Test 06/14/21  1455   PTHI 107*     IRON STUDIES  Recent Labs   Lab Test 10/28/21  1118 10/20/21  1022 02/19/21  1528   IRON 60  --   --      --   --    IRONSAT 19  --   --    SEGUNDO 81 78 97       Tiffany Suero, MENDOZA

## 2021-11-02 NOTE — LETTER
11/2/2021       RE: Erich Craven  [address]  [place] [address]     Dear Colleague,    Thank you for referring your patient, Erich Craven, to the [facility]PHROLOG[facility] [place] at [facility]. Please see a copy of my visit note below.    Jaun is a 81 year old who is being evaluated via a billable video visit.      How would you like to obtain your AVS? MyChart  If the video visit is dropped, the invitation should be resent by: Text to cell phone: [phone]  Will anyone else be joining your video visit? No      Video Start Time: 1000  Video-Visit Details    Type of service:  Video Visit    Video End Time:1030    Originating Location (pt. Location): home    Distant Location (provider location):  [facility] [place]     Platform used for Video Visit: Keep Your [facility]      Nephrology Video Visit 11/2/21    Assessment and Plan:    1. CKD3a/b w/mild proteinuria - Stable. Creat 1.3, eGFR 47 ml/mn, UPCR 0.4 g/gCr  Baseline creat mid to upper 1's since 8/20 w/recurrent DONA   - Creat bumped up to 1.8 on 10/20/21. He was seen by PCP that day and b/p was soft, creat elevated, K borderline. Lisinopril was decreased to 20 mg every day ( from 40 mg) and Lasix decreased to 60 mg every day ( from 80 mg)   - Etiology of his CKD likely HTN. Etiology of his recent DONA was likely over diuresis   - Renal US normal   - Does not use NSAIDs   - Blood pressure at goal but may have been running low prior to the 10/20 visit   - DM well controlled with A1c of 5.9 % ( 10/21)   - He is on statin for CV risk reduction    2. CV/HTN/Pafib, Diastolic HF - Currently well controlled. Has very mild end of day edema. No dyspnea. Weight 275# ,down from 292# in 6/21. Home b/ps now in the teens - 130/.  Has not brought in his b/p device to be checked for accuracy. I suspect his b/p was running low prior to med change by PCP on 10/20 given normal b/ps now  on half the dose of Lisinopril and decreased dose of Lasix.   Current regimen:    Coreg 12.5 mg bid   Lasix 40 mg AM, 20 mg PM   Spironolactone 25 mg every other day   Lisinopril 20 mg every day   Diltiazem  mg every day   Imdur 30 mg every day   Metolazone 2.5 mg bid    - Continue current regimen for now but would consider decreasing his Metolazone given mild hyponatremia if blood pressures dip with ongoing weight loss    3. Electrolytes - Patient with mild hyponatremia ( Na 132) and borderline hyperkalemia ( K 5.2)    - He should not need a fluid restriction given adequate GFR   - He is on Metolazone which can cause hyponatremia so will need to trend   - He has borderline hyperk despite reduction in ACE. Will need to monitor given that he is also on Spironolactone    4. Acid base - No acute concerns. Bicarb 25    5. BMD - Ca 9.0 Phos 3.9 albumin 3.9   Vit D 34,  ( 6/21)   Not currently on Vit D    6. BPH - Controlled with Flomax    7. Anemia - Hgb 12.6   - Iron studies 10/21: Ferritin 81 Fe 60 IS 19   - He does not require iron/LEIGH at this time    8. Disposition - RTC 3 months for follow up w/labs prior    Assessment and plan was discussed with patient and he voiced his understanding and agreement.    Reason for Visit:  CKD/HTN    HPI:  Mr Craven is an 80 yo male with CKD3b, COPD, RIN on BiPAP, Diastolic HF, SSS s/p PPM, CAD, P-Afib, HTN, BPH, h/o Pulmonary embolism, present today for CKD follow up. Last seen in clinic by me on 6/23/21  Baseline creat mid to upper 1's    ROS:   No complaints  Working with pain clinic regarding shoulder pain which is slowly improving  Reports mild end of day edema.   Using Bipap at  for his RIN  Reviewed medication changes following visit with his PCP on 10/20/21. His blood pressure in the office was 105/, creat was up to 1.8. His Lisinopril was decreased to 20 mg every day and Lasix decreased to 60 mg every day.   Home blood pressures following those changes are now  in the teens - 130/   Has completed his COVID vax series and Flu vax  Denies CP/dyspnea  No abdominal concerns  Voiding w/o complication. On Flomax  Appetite is good. Has been a vegetarian for years  Walking with his dog regularly    Chronic Health Problems:    CKD3b  COPD  RIN on CPAP  Diastolic HF  SSS s/p PPM  CAD  P-Afib  h/o Pulmonary embolism  HTN  Hypothyroidism  Anxiety  BPH  HLD    Family Hx:   Family History   Problem Relation Age of Onset     Hypertension Father      Personal Hx:   , lives with his puppy Clayton. NS    Allergies:  Allergies   Allergen Reactions     No Known Allergies        Medications:  Current Outpatient Medications   Medication Sig     acetaminophen (TYLENOL) 500 MG tablet Take 1,000 mg by mouth 4 times daily      Acetylcarnitine HCl (ACETYL L-CARNITINE) 500 MG CAPS Take 1 capsule by mouth 2 times daily     albuterol (PROAIR HFA/PROVENTIL HFA/VENTOLIN HFA) 108 (90 Base) MCG/ACT inhaler Inhale 2 puffs into the lungs every 4 hours as needed for shortness of breath / dyspnea or wheezing     aspirin (ASA) 81 MG chewable tablet Take 1 tablet by mouth daily     atorvastatin (LIPITOR) 40 MG tablet TAKE 1 TABLET BY MOUTH EVERY EVENING     carvedilol (COREG) 12.5 MG tablet TAKE 1 TABLET BY MOUTH TWICE A DAY     co-enzyme Q-10 100 MG CAPS capsule Take 100 mg by mouth daily     diclofenac (VOLTAREN) 1 % topical gel Apply 1 g topically 2 times daily as needed for moderate pain     diltiazem ER COATED BEADS (CARDIZEM CD/CARTIA XT) 300 MG 24 hr capsule TAKE 1 CAPSULE BY MOUTH EVERY DAY     Fluticasone-Umeclidin-Vilanterol (TRELEGY ELLIPTA) 200-62.5-25 MCG/INH oral inhaler Inhale 1 puff into the lungs daily     furosemide (LASIX) 20 MG tablet Take 2 tabs in morning and 1 tab in the sfternoon     isosorbide mononitrate (IMDUR) 30 MG 24 hr tablet TAKE 1 TABLET BY MOUTH EVERY DAY     levothyroxine (SYNTHROID/LEVOTHROID) 175 MCG tablet Take 1 tablet (175 mcg) by mouth daily Additional 1/2 tablet on  Sundays     lidocaine (LMX4) 4 % external cream Apply topically as needed for mild pain     lisinopril (ZESTRIL) 20 MG tablet Take 1 tablet (20 mg) by mouth daily     metolazone (ZAROXOLYN) 2.5 MG tablet Take 1 tablet (2.5 mg) by mouth 2 times daily     Multiple vitamin TABS Take 1 tablet by mouth daily     order for DME Oxygen 2 Li/min  Continuous. At night bled into PAP device. In the daytime, NC with bubbler: requiring portability     sertraline (ZOLOFT) 25 MG tablet Take 2 tablets (50 mg) by mouth daily     spironolactone (ALDACTONE) 25 MG tablet TAKE 1 TABLET (25 MG) BY MOUTH EVERY OTHER DAY     tamsulosin (FLOMAX) 0.4 MG capsule Take 1 capsule (0.4 mg) by mouth daily     Taurine 500 MG CAPS Take 500 mg by mouth 2 times daily     warfarin ANTICOAGULANT (COUMADIN) 5 MG tablet Take 1.5 tablets (7.5 mg) on Fridays and take 1 tablet (5 mg) all other days or as directed by the INR clinic     No current facility-administered medications for this visit.      Vitals:  B/P 134/66, Wt 275#    Exam:  GEN: Pleasant male in NAD  RESP: Breathing is non labored. Speaking in full sentences  NEURO: A/O  PSYCH: Bright affect, engaging     LABS:   CMP  Recent Labs   Lab Test 10/28/21  1118 10/20/21  1022 09/23/21  1349 08/31/21  1401 06/21/21  1454 06/14/21  1455 06/14/21  1455 04/21/21  0931 04/21/21  0931 03/02/21  1405 03/02/21  1405   * 135  --  137 138   < > 137   < > 138   < > 136   POTASSIUM 5.2 5.2  --  4.2 4.1   < > 4.7   < > 3.7   < > 4.5   CHLORIDE 102 101  --  103 103   < > 105   < > 99   < > 104   CO2 25 23  --  28 29   < > 28   < > 39*   < > 28   ANIONGAP 5 11  --  6 6   < > 4   < > <1*   < > 4   GLC 95 92 89 127* 100*   < > 108*   < > 104*   < > 93   BUN 47* 68*  --  45* 64*   < > 62*   < > 44*   < > 57*   CR 1.39* 1.85*  --  1.07 1.57*   < > 1.81*   < > 1.02   < > 1.42*   GFRESTIMATED 47* 33*  --  64 40*   < > 34*   < > 68   < > 46*   GFRESTBLACK  --   --   --   --  47*  --  39*  --  79  --  53*   LAWSON 9.0  9.3  --  9.0 9.4   < > 9.2   < > 9.2   < > 9.6    < > = values in this interval not displayed.     Recent Labs   Lab Test 10/20/21  1022 02/19/21  1528 08/11/20  1343 07/14/20  1532 01/22/20  1216 01/22/20  1216   BILITOTAL 0.4 0.4 0.5  --   --  0.5   ALKPHOS 108 107 104  --   --  112   ALT 20 26 49 45   < > 45   AST 11 14 31  --   --  25    < > = values in this interval not displayed.     CBC  Recent Labs   Lab Test 10/28/21  1118 10/20/21  1022 06/14/21  1455 03/24/21  1805 03/04/21  1932 03/04/21 1932   HGB 12.6* 12.5* 12.2* 11.9*   < > 11.4*   WBC  --  10.0 10.2 10.4  --  10.4   RBC  --  3.78* 3.68* 3.65*  --  3.49*   HCT  --  38.1* 36.7* 37.3*  --  36.1*   MCV  --  101* 100 102*  --  103*   MCH  --  33.1* 33.2* 32.6  --  32.7   MCHC  --  32.8 33.2 31.9  --  31.6   RDW  --  17.0* 15.6* 15.8*  --  15.7*   PLT  --  224 195 208  --  185    < > = values in this interval not displayed.     URINE STUDIES  Recent Labs   Lab Test 06/14/21  1516 04/21/21  0853 12/21/20  1219 12/14/20  1706 08/11/20  1344 08/11/20  1344   COLOR Yellow Yellow Yellow Yellow   < > Yellow   APPEARANCE Cloudy Clear Clear Clear   < > Clear   URINEGLC Negative Negative Negative Negative   < > Negative   URINEBILI Negative Negative Negative Negative   < > Negative   URINEKETONE Negative Negative Negative Negative   < > Negative   SG 1.020 1.015 1.015 1.015   < > 1.020   UBLD Small* Negative Negative Trace*   < > Negative   URINEPH 5.0 7.0 5.0 6.0   < > 6.5   PROTEIN 30* Negative Negative 30*   < > Negative   UROBILINOGEN 0.2 0.2 0.2 0.2   < > 0.2   NITRITE Negative Negative Negative Negative   < > Negative   LEUKEST Moderate* Negative Small* Small*   < > Small*   RBCU Unable to quantitate other elements due to packed WBC's. *  --  O - 2 2-5*  --  2-5*   WBCU >100*  --  10-25* 25-50*  --  25-50*    < > = values in this interval not displayed.     Recent Labs   Lab Test 10/28/21  1118 06/14/21  1516   UTPG 0.44* 0.38*     PTH  Recent Labs   Lab  Test 06/14/21  1455   PTHI 107*     IRON STUDIES  Recent Labs   Lab Test 10/28/21  1118 10/20/21  1022 02/19/21  1528   IRON 60  --   --      --   --    IRONSAT 19  --   --    SEGUNDO 81 78 97       Tiffany Suero, NP

## 2021-11-02 NOTE — PROGRESS NOTES
Jaun is a 81 year old who is being evaluated via a billable video visit.      How would you like to obtain your AVS? [facility]  If the video visit is dropped, the invitation should be resent by: Text to cell phone: [phone]  Will anyone else be joining your video visit? No      Video Start Time: 1000  Video-Visit Details    Type of service:  Video Visit    Video End Time:1030    Originating Location (pt. Location): home    Distant Location (provider location):  [facility] NEPHROLOG[facility] [place]     Platform used for Video Visit: TXCOM

## 2021-11-03 ENCOUNTER — LAB (OUTPATIENT)
Dept: LAB | Facility: CLINIC | Age: 81
End: 2021-11-03
Payer: MEDICARE

## 2021-11-03 ENCOUNTER — ANTICOAGULATION THERAPY VISIT (OUTPATIENT)
Dept: NURSING | Facility: CLINIC | Age: 81
End: 2021-11-03

## 2021-11-03 DIAGNOSIS — I26.99 PE (PULMONARY THROMBOEMBOLISM) (H): ICD-10-CM

## 2021-11-03 DIAGNOSIS — I82.4Z9 DEEP VEIN THROMBOSIS (DVT) OF DISTAL VEIN OF LOWER EXTREMITY, UNSPECIFIED CHRONICITY, UNSPECIFIED LATERALITY (H): ICD-10-CM

## 2021-11-03 DIAGNOSIS — I48.0 PAROXYSMAL A-FIB (H): ICD-10-CM

## 2021-11-03 LAB — INR BLD: 2.4 (ref 0.9–1.1)

## 2021-11-03 PROCEDURE — 85610 PROTHROMBIN TIME: CPT

## 2021-11-03 PROCEDURE — 36416 COLLJ CAPILLARY BLOOD SPEC: CPT

## 2021-11-03 NOTE — PROGRESS NOTES
Anticoagulation Management    Unable to reach Jaun today.    Today's INR result of 2.4 is therapeutic (goal INR of 2.0-3.0).  Result received from: Clinic Lab    Follow up required to assess for changes     Left a voicemail advising pt to continue taking warfarin 5 mg daily. Want to confirm the medication changes regarding a decrease in lasix,a decrease in lisinopril,stopping clonazepam and whether continuing tramadol PRN for pain. Also want to confirm whether he has stopped trazodone.    Requested a call back    Transfer to [phone]    Anticoagulation clinic to follow up    Delia Jang RN

## 2021-11-04 NOTE — PROGRESS NOTES
ANTICOAGULATION MANAGEMENT     Erich Craven 81 year old male is on warfarin with therapeutic INR result. (Goal INR 2.0-3.0)    Recent labs: (last 7 days)     11/03/21  0846   INR 2.4*       ASSESSMENT     Source(s): Chart Review and Patient/Caregiver Call       Warfarin doses taken: Warfarin taken as instructed    Diet: No new diet changes identified    New illness, injury, or hospitalization: Yes: Pt has bilateral shoulder pain. Pt reports that he needs surgery but will probably schedule this in the Spring.    Medication/supplement changes: Lasix was decreased, lisinopril was decreased,trazodone was stopped. Pt uses Tramadol PRN for shoulder pain    Signs or symptoms of bleeding or clotting: No    Previous INR: Supratherapeutic    Additional findings: None     PLAN     Recommended plan for ongoing change(s) affecting INR     Dosing Instructions: Continue your current warfarin dose with next INR in 3 weeks       Summary  As of 11/3/2021    Full warfarin instructions:  5 mg every day   Next INR check:  11/24/2021             Telephone call with Erich who agrees to plan and repeated back plan correctly    Lab visit scheduled    Education provided: Importance of notifying clinic for changes in medications; a sooner lab recheck maybe needed. and Contact [phone]  with any changes, questions or concerns.     Plan made per [facility] anticoagulation protocol    Delia Jang RN  Anticoagulation Clinic  11/4/2021    _______________________________________________________________________     Anticoagulation Episode Summary     Current INR goal:  2.0-3.0   TTR:  65.5 % (1 y)   Target end date:  Indefinite   Send INR reminders to:  [facility]    Indications    PE (pulmonary thromboembolism) (H) [I26.99]  Paroxysmal A-fib (H) [I48.0]  Deep vein thrombosis (DVT) of distal vein of lower extremity  unspecified chronicity  unspecified laterality (H) [I82.4Z9]           Comments:           Anticoagulation Care Providers      Provider Role Specialty Phone number    Dontrell Gómez MD Referring Internal Medicine [phone]    Anne Heath MD Referring Internal Medicine [phone]

## 2021-11-04 NOTE — PROGRESS NOTES
Jaun is a 81 year old who is being evaluated via a billable video visit.      How would you like to obtain your AVS? MyChart  If the video visit is dropped, the invitation should be resent by: Text to cell phone: [phone]   Will anyone else be joining your video visit? No      Video Start Time: 2:54 PM    [facility]     Date of visit: 11/8/2021      Assessment:   The patient is a 81-year-old male with past medical history significant for primary osteoarthritis of the both shoulders, chronic bilateral shoulder pain (right greater than the left), IRN, COPD, obesity with BMI 39, hypertension, paroxysmal A. fib status post pacemaker and DVT.       Visit Diagnoses:  1. Primary osteoarthritis of both shoulders        Plan:    Therapies:   1. Physical Therapy: continue regular physical activity as able. Restart physical therapy when ready.   2. Clinical Health Psychologist to address issues of relaxation, behavioral change, coping style, and other factors important to improvement: not at this time  Medication Management:   1. Jaun reports excellent pain benefit with Tramadol and notes this medication allows him to be more active. He was able to discontinue clonazepam without any difficulty. He reports no side effects from this medication. It is reasonable to continue Tramadol 50mg BID as needed for severe pain but I do not recommend dose increase.   Further procedures recommended:   3. One month of pain benefit with last two suprascapular nerve blocks- 2 weeks of almost 100% pain relief and then wearing off. Jaun is open to more invasive procedures if he can avoid surgery. I will discuss intervention options with Dr. Allen. If he recommended repeating suprascapular nerve blocks, or pulsed RFA or peripheral nerve stimulator, we will reach out to discuss.     Follow up: in 8-12 weeks.       Krupa AGUSTIN [facility]      -------------------------------------------------    Subjective:    Chief complaint:   Chief Complaint   Patient presents with     Pain     follow up       Interval history:  Erich Craven is a 81 year old male last seen on 9/9/2021.  He is a patient of Dr. Allen seen in follow up.     Recommendations/plan at the last visit included:  Therapies:   4. Physical Therapy: continue regular physical activity as able.   5. Clinical Health Psychologist to address issues of relaxation, behavioral change, coping style, and other factors important to improvement: not at this time  Medication Management:   1. Jaun reports very good pain benefit with Tramadol, was more active, and had minimal side effects with this medication. He also agrees to discontinue clonazepam as he doesn't feel it is necessary. Okay to restart Tramadol 25mg BID as needed for severe pain. Nursing will contact him to set up an appointment to complete requirements for ongoing prescribing.   2. As discussed, there is a dangerous interaction between clonazepam and Tramadol, especially with a hx of COPD. To minimize the risk, I recommended discontinuing this medication and he is in agreement.  Further procedures recommended:   6. It might be reasonable to repeat suprascapular nerve blocks but will discuss first with Dr. Allen. If he agrees, we will order.     Follow up: TBD.     Since his last visit, Erich Craven reports:  -His pain is about the same as it was at last visit.   -He continues to struggle with bilateral shoulder pain.  -He knows he will need shoulder surgery but hopes to hold off until the spring to have this done. His orthopedic surgeon is with [facility]- Dr. Kumar.   -Though he is seriously considering surgery, he would be open to other interventions if recommended and he could avoid surgery.   -He had repeat bilateral suprascapular nerve blocks with Dr. Allen on 9/23/2021. He reports excellent pain benefit for 2  weeks but wearing off fairly quickly afterwards.   -He completed a UDS and CSA. He started Tramadol 50mg and has been taking 50mg BID. He notes this is somewhat effective.   -He had a couple of visits of physical therapy with [facility] but had to pause as he had an injury. He thinks this was fairly helpful. He plans to restart soon.       HPI per Dr. Allen:  The patient is a 81-year-old male with past medical history significant for primary osteoarthritis of the both shoulders, chronic bilateral shoulder pain (right greater than the left), RIN, COPD, obesity with BMI 39, hypertension, paroxysmal A. fib status post pacemaker and DVT.  His pain located in the anterior aspect bilateral shoulder and occasionally pain radiating to bilateral upper arms and to elbows.  Pain increases with reaching overhead, reaching behind the back, putting on jacket forgetting and lifting.  He is unable to lift more than 90 degrees.  He notes that pain gradually gets worse throughout the day.  He has been taking Tylenol 3 with good pain relief.  He states that he takes only at night.  He was scheduled for the shoulder replacement surgery however it was canceled due to his COPD exacerbation and upper respiratory infection.  He is still planning on right shoulder surgery.  Date is not confirmed yet.     He came to us for better pain control.  In the past he had performed multiple shoulder injections with good pain relief.  He is interested in trying them again.  He also requests alternative to Tylenol 3.     I have reviewed his x-ray of the shoulder.  It shows severe degenerative joint disease in both shoulder.  No visible erosion of the glenoid in the right shoulder is noted.       Pain Information:   Pain rating: averages 5/10 on a 0-10 scale.     Current pain medications:    Tylenol 1000mg QID- somewhat helpful, not as good as Tramadol    Tramadol 50mg BID- helpful   Zoloft 50mg daily   Hemp CBD gel- [facility]   Voltaren  gel QID PRN- H    Current MME: 0    THE 4 A's OF OPIOID MAINTENANCE ANALGESIA    Analgesia: very good at low dose    Activity: improved    Adverse effects: denies    Adherence to Rx protocol: no concerns    [place] Board of Pharmacy Data Base Reviewed:    YES;     Review of [place] Prescription Monitoring Program (): No concern for abuse or misuse of controlled medications based on this report.     Annual Controlled Substance Agreement/UDS due date: September 2022    Medications:  Current Outpatient Medications   Medication Sig Dispense Refill     acetaminophen (TYLENOL) 500 MG tablet Take 1,000 mg by mouth 4 times daily        Acetylcarnitine HCl (ACETYL L-CARNITINE) 500 MG CAPS Take 1 capsule by mouth 2 times daily       albuterol (PROAIR HFA/PROVENTIL HFA/VENTOLIN HFA) 108 (90 Base) MCG/ACT inhaler Inhale 2 puffs into the lungs every 4 hours as needed for shortness of breath / dyspnea or wheezing 1 Inhaler 0     aspirin (ASA) 81 MG chewable tablet Take 1 tablet by mouth daily       atorvastatin (LIPITOR) 40 MG tablet TAKE 1 TABLET BY MOUTH EVERY EVENING 90 tablet 2     carvedilol (COREG) 12.5 MG tablet TAKE 1 TABLET BY MOUTH TWICE A  tablet 1     co-enzyme Q-10 100 MG CAPS capsule Take 100 mg by mouth daily       diclofenac (VOLTAREN) 1 % topical gel Apply 1 g topically 2 times daily as needed for moderate pain 50 g 0     diltiazem ER COATED BEADS (CARDIZEM CD/CARTIA XT) 300 MG 24 hr capsule TAKE 1 CAPSULE BY MOUTH EVERY DAY 90 capsule 3     Fluticasone-Umeclidin-Vilanterol (TRELEGY ELLIPTA) 200-62.5-25 MCG/INH oral inhaler Inhale 1 puff into the lungs daily       furosemide (LASIX) 20 MG tablet Take 2 tabs in morning and 1 tab in the sfternoon 360 tablet 3     isosorbide mononitrate (IMDUR) 30 MG 24 hr tablet TAKE 1 TABLET BY MOUTH EVERY DAY 90 tablet 3     levothyroxine (SYNTHROID/LEVOTHROID) 175 MCG tablet Take 1 tablet (175 mcg) by mouth daily Additional 1/2 tablet on Sundays 90 tablet 2      lidocaine (LMX4) 4 % external cream Apply topically as needed for mild pain       lisinopril (ZESTRIL) 20 MG tablet Take 1 tablet (20 mg) by mouth daily 90 tablet 3     metolazone (ZAROXOLYN) 2.5 MG tablet Take 1 tablet (2.5 mg) by mouth 2 times daily 180 tablet 3     Multiple vitamin TABS Take 1 tablet by mouth daily       order for DME Oxygen 2 Li/min  Continuous. At night bled into PAP device. In the daytime, NC with bubbler: requiring portability 1 Device 0     sertraline (ZOLOFT) 25 MG tablet Take 2 tablets (50 mg) by mouth daily 180 tablet 1     spironolactone (ALDACTONE) 25 MG tablet TAKE 1 TABLET (25 MG) BY MOUTH EVERY OTHER DAY 45 tablet 0     tamsulosin (FLOMAX) 0.4 MG capsule Take 1 capsule (0.4 mg) by mouth daily 90 capsule 0     Taurine 500 MG CAPS Take 500 mg by mouth 2 times daily       warfarin ANTICOAGULANT (COUMADIN) 5 MG tablet Take 1.5 tablets (7.5 mg) on Fridays and take 1 tablet (5 mg) all other days or as directed by the INR clinic 100 tablet 1       Medical History: any changes in medical history since they were last seen? No    Review of Systems: A 10-point review of systems was negative, with the exception of chronic pain issues..     Objective:    Physical Exam:  There were no vitals taken for this visit.  Constitutional: Well developed, well nourished, appears stated age. Obese.  HEENT: Head atraumatic, normocephalic. Eyes without conjunctival injection or jaundice. Oropharynx clear. Neck supple. No obvious neck masses.  Skin: No rash, lesions, or petechiae of exposed skin.   Psychiatric/mental status: Alert, without lethargy or stupor. Speech fluent. Appropriate affect. Mood normal. Able to follow commands without difficulty.     Imaging:  X-ray of bilateral shoulders was completed on 2/26/2021 and shows:  Impression: Bilateral shoulder x-rays demonstrate severe degenerative joint disease of both shoulders.  Noticeable erosion of the glenoid in the right shoulder is noted.  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  No significant proximal migration of the humeral head is noted bilaterally.  Otherwise, no acute fractures or other osseous pathology noted.      BILLING TIME DOCUMENTATION:   The total TIME spent on this patient on the date of the encounter/appointment was 26 minutes.      TOTAL TIME includes:   Time spent preparing to see the patient (reviewing records and tests)   Time spent face to face (or over the phone) with the patient   Time spent ordering tests, medications, procedures and referrals   Time spent Referring and communicating with other healthcare professionals   Time spent documenting clinical information in Epic     Video-Visit Details    Type of service:  Video Visit    Video End Time:3:13 PM    Originating Location (pt. Location): Home    Distant Location (provider location):  [facility] [place]     Platform used for Video Visit: [employer]

## 2021-11-05 ENCOUNTER — VIRTUAL VISIT (OUTPATIENT)
Dept: ANESTHESIOLOGY | Facility: CLINIC | Age: 81
End: 2021-11-05
Payer: MEDICARE

## 2021-11-05 DIAGNOSIS — M19.012 PRIMARY OSTEOARTHRITIS OF BOTH SHOULDERS: Primary | ICD-10-CM

## 2021-11-05 DIAGNOSIS — M19.011 PRIMARY OSTEOARTHRITIS OF BOTH SHOULDERS: Primary | ICD-10-CM

## 2021-11-05 PROCEDURE — 99213 OFFICE O/P EST LOW 20 MIN: CPT | Mod: 95 | Performed by: NURSE PRACTITIONER

## 2021-11-05 ASSESSMENT — PAIN SCALES - GENERAL: PAINLEVEL: SEVERE PAIN (6)

## 2021-11-05 NOTE — LETTER
11/5/2021       RE: Erich Craven  [address]  [facility] [address]     Dear Colleague,    Thank you for referring your patient, Erich Craven, to the [facility] FOR COMPREHENSIVE PAIN MANAGEMENT [place] at [facility]. Please see a copy of my visit note below.    Jaun is a 81 year old who is being evaluated via a billable video visit.      How would you like to obtain your AVS? MyChart  If the video visit is dropped, the invitation should be resent by: Text to cell phone: [phone]   Will anyone else be joining your video visit? No      Video Start Time: 2:54 PM    [facility] Pain Management     Date of visit: 11/8/2021      Assessment:   The patient is a 81-year-old male with past medical history significant for primary osteoarthritis of the both shoulders, chronic bilateral shoulder pain (right greater than the left), RIN, COPD, obesity with BMI 39, hypertension, paroxysmal A. fib status post pacemaker and DVT.       Visit Diagnoses:  1. Primary osteoarthritis of both shoulders        Plan:    Therapies:   1. Physical Therapy: continue regular physical activity as able. Restart physical therapy when ready.   2. Clinical Health Psychologist to address issues of relaxation, behavioral change, coping style, and other factors important to improvement: not at this time  Medication Management:   1. Jaun reports excellent pain benefit with Tramadol and notes this medication allows him to be more active. He was able to discontinue clonazepam without any difficulty. He reports no side effects from this medication. It is reasonable to continue Tramadol 50mg BID as needed for severe pain but I do not recommend dose increase.   Further procedures recommended:   3. One month of pain benefit with last two suprascapular nerve blocks- 2 weeks of almost 100% pain relief and then wearing off. Jaun is open to more invasive procedures if he can  avoid surgery. I will discuss intervention options with Dr. Allen. If he recommended repeating suprascapular nerve blocks, or pulsed RFA or peripheral nerve stimulator, we will reach out to discuss.     Follow up: in 8-12 weeks.       Krupa AGUSTIN [facility] Pain Management     -------------------------------------------------    Subjective:    Chief complaint:   Chief Complaint   Patient presents with     Pain     follow up       Interval history:  Erich Craven is a 81 year old male last seen on 9/9/2021.  He is a patient of Dr. Allen seen in follow up.     Recommendations/plan at the last visit included:  Therapies:   4. Physical Therapy: continue regular physical activity as able.   5. Clinical Health Psychologist to address issues of relaxation, behavioral change, coping style, and other factors important to improvement: not at this time  Medication Management:   1. Jaun reports very good pain benefit with Tramadol, was more active, and had minimal side effects with this medication. He also agrees to discontinue clonazepam as he doesn't feel it is necessary. Okay to restart Tramadol 25mg BID as needed for severe pain. Nursing will contact him to set up an appointment to complete requirements for ongoing prescribing.   2. As discussed, there is a dangerous interaction between clonazepam and Tramadol, especially with a hx of COPD. To minimize the risk, I recommended discontinuing this medication and he is in agreement.  Further procedures recommended:   6. It might be reasonable to repeat suprascapular nerve blocks but will discuss first with Dr. Allen. If he agrees, we will order.     Follow up: TBD.     Since his last visit, Erich Craven reports:  -His pain is about the same as it was at last visit.   -He continues to struggle with bilateral shoulder pain.  -He knows he will need shoulder surgery but hopes to hold off until the spring to have this done. His orthopedic surgeon is with [facility]- Dr. Kumar.   -Though he is seriously considering surgery, he would be open to other interventions if recommended and he could avoid surgery.   -He had repeat bilateral suprascapular nerve blocks with Dr. Allen on 9/23/2021. He reports excellent pain benefit for 2 weeks but wearing off fairly quickly afterwards.   -He completed a UDS and CSA. He started Tramadol 50mg and has been taking 50mg BID. He notes this is somewhat effective.   -He had a couple of visits of [facility] but had to pause as he had an injury. He thinks this was fairly helpful. He plans to restart soon.       HPI per Dr. Allen:  The patient is a 81-year-old male with past medical history significant for primary osteoarthritis of the both shoulders, chronic bilateral shoulder pain (right greater than the left), RIN, COPD, obesity with BMI 39, hypertension, paroxysmal A. fib status post pacemaker and DVT.  His pain located in the anterior aspect bilateral shoulder and occasionally pain radiating to bilateral upper arms and to elbows.  Pain increases with reaching overhead, reaching behind the back, putting on jacket forgetting and lifting.  He is unable to lift more than 90 degrees.  He notes that pain gradually gets worse throughout the day.  He has been taking Tylenol 3 with good pain relief.  He states that he takes only at night.  He was scheduled for the shoulder replacement surgery however it was canceled due to his COPD exacerbation and upper respiratory infection.  He is still planning on right shoulder surgery.  Date is not confirmed yet.     He came to us for better pain control.  In the past he had performed multiple shoulder injections with good pain relief.  He is interested in trying them again.  He also requests alternative to Tylenol 3.     I have reviewed his x-ray of the shoulder.  It shows severe degenerative joint disease in both shoulder.  No visible erosion of the  glenoid in the right shoulder is noted.       Pain Information:   Pain rating: averages 5/10 on a 0-10 scale.     Current pain medications:    Tylenol 1000mg QID- somewhat helpful, not as good as Tramadol    Tramadol 50mg BID- helpful   Zoloft 50mg daily   Hemp CBD gel- [facility]   Voltaren gel QID PRN- [facility]    Current MME: 0    THE 4 A's OF OPIOID MAINTENANCE ANALGESIA    Analgesia: very good at low dose    Activity: improved    Adverse effects: denies    Adherence to Rx protocol: no concerns    [place] Board of Pharmacy Data Base Reviewed:    YES;     Review of [place] Prescription Monitoring Program (): No concern for abuse or misuse of controlled medications based on this report.     Annual Controlled Substance Agreement/UDS due date: September 2022    Medications:  Current Outpatient Medications   Medication Sig Dispense Refill     acetaminophen (TYLENOL) 500 MG tablet Take 1,000 mg by mouth 4 times daily        Acetylcarnitine HCl (ACETYL L-CARNITINE) 500 MG CAPS Take 1 capsule by mouth 2 times daily       albuterol (PROAIR HFA/PROVENTIL HFA/VENTOLIN HFA) 108 (90 Base) MCG/ACT inhaler Inhale 2 puffs into the lungs every 4 hours as needed for shortness of breath / dyspnea or wheezing 1 Inhaler 0     aspirin (ASA) 81 MG chewable tablet Take 1 tablet by mouth daily       atorvastatin (LIPITOR) 40 MG tablet TAKE 1 TABLET BY MOUTH EVERY EVENING 90 tablet 2     carvedilol (COREG) 12.5 MG tablet TAKE 1 TABLET BY MOUTH TWICE A  tablet 1     co-enzyme Q-10 100 MG CAPS capsule Take 100 mg by mouth daily       diclofenac (VOLTAREN) 1 % topical gel Apply 1 g topically 2 times daily as needed for moderate pain 50 g 0     diltiazem ER COATED BEADS (CARDIZEM CD/CARTIA XT) 300 MG 24 hr capsule TAKE 1 CAPSULE BY MOUTH EVERY DAY 90 capsule 3     Fluticasone-Umeclidin-Vilanterol (TRELEGY ELLIPTA) 200-62.5-25 MCG/INH oral inhaler Inhale 1 puff into the lungs daily       furosemide (LASIX) 20 MG tablet Take 2 tabs in  morning and 1 tab in the sfternoon 360 tablet 3     isosorbide mononitrate (IMDUR) 30 MG 24 hr tablet TAKE 1 TABLET BY MOUTH EVERY DAY 90 tablet 3     levothyroxine (SYNTHROID/LEVOTHROID) 175 MCG tablet Take 1 tablet (175 mcg) by mouth daily Additional 1/2 tablet on Sundays 90 tablet 2     lidocaine (LMX4) 4 % external cream Apply topically as needed for mild pain       lisinopril (ZESTRIL) 20 MG tablet Take 1 tablet (20 mg) by mouth daily 90 tablet 3     metolazone (ZAROXOLYN) 2.5 MG tablet Take 1 tablet (2.5 mg) by mouth 2 times daily 180 tablet 3     Multiple vitamin TABS Take 1 tablet by mouth daily       order for DME Oxygen 2 Li/min  Continuous. At night bled into PAP device. In the daytime, NC with bubbler: requiring portability 1 Device 0     sertraline (ZOLOFT) 25 MG tablet Take 2 tablets (50 mg) by mouth daily 180 tablet 1     spironolactone (ALDACTONE) 25 MG tablet TAKE 1 TABLET (25 MG) BY MOUTH EVERY OTHER DAY 45 tablet 0     tamsulosin (FLOMAX) 0.4 MG capsule Take 1 capsule (0.4 mg) by mouth daily 90 capsule 0     Taurine 500 MG CAPS Take 500 mg by mouth 2 times daily       warfarin ANTICOAGULANT (COUMADIN) 5 MG tablet Take 1.5 tablets (7.5 mg) on Fridays and take 1 tablet (5 mg) all other days or as directed by the INR clinic 100 tablet 1       Medical History: any changes in medical history since they were last seen? No    Review of Systems: A 10-point review of systems was negative, with the exception of chronic pain issues..     Objective:    Physical Exam:  There were no vitals taken for this visit.  Constitutional: Well developed, well nourished, appears stated age. Obese.  HEENT: Head atraumatic, normocephalic. Eyes without conjunctival injection or jaundice. Oropharynx clear. Neck supple. No obvious neck masses.  Skin: No rash, lesions, or petechiae of exposed skin.   Psychiatric/mental status: Alert, without lethargy or stupor. Speech fluent. Appropriate affect. Mood normal. Able to follow  commands without difficulty.     Imaging:  X-ray of bilateral shoulders was completed on 2/26/2021 and shows:  Impression: Bilateral shoulder x-rays demonstrate severe degenerative joint disease of both shoulders.  Noticeable erosion of the glenoid in the right shoulder is noted.  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  No significant proximal migration of the humeral head is noted bilaterally.  Otherwise, no acute fractures or other osseous pathology noted.      BILLING TIME DOCUMENTATION:   The total TIME spent on this patient on the date of the encounter/appointment was 26 minutes.      TOTAL TIME includes:   Time spent preparing to see the patient (reviewing records and tests)   Time spent face to face (or over the phone) with the patient   Time spent ordering tests, medications, procedures and referrals   Time spent Referring and communicating with other healthcare professionals   Time spent documenting clinical information in Epic     Video-Visit Details    Type of service:  Video Visit    Video End Time:3:13 PM    Originating Location (pt. Location): [place]    Distant Location (provider location):  [facility] FOR COMPREHENSIVE PAIN MANAGEMENT [place]     Platform used for Video Visit: AmWell        Again, thank you for allowing me to participate in the care of your patient.      Sincerely,    VAMSI Sparrow CNP

## 2021-11-05 NOTE — PATIENT INSTRUCTIONS
Therapies:   1. Physical Therapy: continue regular physical activity as able. Restart physical therapy when ready.   2. Clinical Health Psychologist to address issues of relaxation, behavioral change, coping style, and other factors important to improvement: not at this time  Medication Management:   1. Okay to continue Tramadol 50mg twice daily as needed for severe pain. I do not recommend dose increase.   Further procedures recommended:   3. I will discuss intervention options with Dr. Allen. If he recommended repeating suprascapular nerve blocks, or another intervention, we will reach out to discuss.     Follow up: in 8-12 weeks.

## 2021-11-06 DIAGNOSIS — I89.0 LYMPHEDEMA: ICD-10-CM

## 2021-11-08 ENCOUNTER — NURSE TRIAGE (OUTPATIENT)
Dept: FAMILY MEDICINE | Facility: CLINIC | Age: 81
End: 2021-11-08

## 2021-11-08 RX ORDER — SPIRONOLACTONE 25 MG/1
25 TABLET ORAL EVERY OTHER DAY
Qty: 45 TABLET | Refills: 0 | Status: SHIPPED | OUTPATIENT
Start: 2021-11-08 | End: 2022-02-10

## 2021-11-08 NOTE — TELEPHONE ENCOUNTER
Routing refill request to provider for review/approval because:  Labs out of range:  Jamie DE LA GARZA RN  EP Triage

## 2021-11-22 ENCOUNTER — TELEPHONE (OUTPATIENT)
Dept: ANESTHESIOLOGY | Facility: CLINIC | Age: 81
End: 2021-11-22
Payer: MEDICARE

## 2021-11-22 DIAGNOSIS — G89.29 CHRONIC PAIN OF BOTH SHOULDERS: Primary | ICD-10-CM

## 2021-11-22 DIAGNOSIS — M25.512 CHRONIC PAIN OF BOTH SHOULDERS: Primary | ICD-10-CM

## 2021-11-22 DIAGNOSIS — M25.511 CHRONIC PAIN OF BOTH SHOULDERS: Primary | ICD-10-CM

## 2021-11-22 NOTE — TELEPHONE ENCOUNTER
Refill request sent to clinic on 11/22/21    Medication: Tramadol      MNPMP Checked: Yes    Last refilled 10/23/21 for 60 tablets      Last clinic appointment: 11/5/21  Next clinic appointment: Not scheduled, will route message to staff to call pt to assist to schedule.     Last Drug Screen Collected: 9/20/21    Controlled Substance Agreement signed: 9/20/21      Preferred pharmacy:    [address]    Per the last clinic note:     1. Jaun reports excellent pain benefit with Tramadol and notes this medication allows him to be more active. He was able to discontinue clonazepam without any difficulty. He reports no side effects from this medication. It is reasonable to continue Tramadol 50mg BID as needed for severe pain but I do not recommend dose increase.     Routed to the provider on 11/22/21.    Shani Shelton LPN

## 2021-11-22 NOTE — TELEPHONE ENCOUNTER
Opioid refill encounter and routed to the provider.     Pt does not have a follow up scheduled, per last clinic note pt was supposed to follow up in 8-12 weeks.     Please assist pt to do so.

## 2021-11-22 NOTE — TELEPHONE ENCOUNTER
[facility] Call Center    Phone Message    May a detailed message be left on voicemail: yes     Reason for Call: Medication Refill Request    Has the patient contacted the pharmacy for the refill? Yes   Name of medication being requested: Tramodol  Provider who prescribed the medication: Sarah Duarte  Pharm[facility] [place]  Date medication is needed: 11/22/21         Action Taken: Message routed to:  [facility]

## 2021-11-23 ENCOUNTER — TRANSFERRED RECORDS (OUTPATIENT)
Dept: HEALTH INFORMATION MANAGEMENT | Facility: CLINIC | Age: 81
End: 2021-11-23
Payer: MEDICARE

## 2021-11-23 RX ORDER — TRAMADOL HYDROCHLORIDE 50 MG/1
50 TABLET ORAL EVERY 6 HOURS PRN
Qty: 60 TABLET | Refills: 0 | Status: SHIPPED | OUTPATIENT
Start: 2021-11-23 | End: 2021-12-16

## 2021-11-23 NOTE — TELEPHONE ENCOUNTER
Signed Prescriptions:                        Disp   Refills    traMADol (ULTRAM) 50 MG tablet             60 tab*0        Sig: Take 1 tablet (50 mg) by mouth every 6 hours as           needed for severe pain Max of 2 tabs/day.  Authorizing Provider: LAURA SANTIZO      Reviewed [place] Prescription Monitoring Program, appears appropriate.     VAMSI Zuluaga [facility] Pain Management

## 2021-11-23 NOTE — TELEPHONE ENCOUNTER
Patient called and reminded to give 7 Days notice. Pt informed they should call or MyChart the clinic next month- as they thought their pharmacy was sending over request.     Refill routed to the provider to review.     Shani Shelton LPN

## 2021-11-24 ENCOUNTER — TRANSFERRED RECORDS (OUTPATIENT)
Dept: HEALTH INFORMATION MANAGEMENT | Facility: CLINIC | Age: 81
End: 2021-11-24

## 2021-11-24 ENCOUNTER — LAB (OUTPATIENT)
Dept: LAB | Facility: CLINIC | Age: 81
End: 2021-11-24
Payer: MEDICARE

## 2021-11-24 ENCOUNTER — ANTICOAGULATION THERAPY VISIT (OUTPATIENT)
Dept: NURSING | Facility: CLINIC | Age: 81
End: 2021-11-24

## 2021-11-24 DIAGNOSIS — I26.99 PE (PULMONARY THROMBOEMBOLISM) (H): ICD-10-CM

## 2021-11-24 DIAGNOSIS — I48.0 PAROXYSMAL A-FIB (H): ICD-10-CM

## 2021-11-24 DIAGNOSIS — I82.4Z9 DEEP VEIN THROMBOSIS (DVT) OF DISTAL VEIN OF LOWER EXTREMITY, UNSPECIFIED CHRONICITY, UNSPECIFIED LATERALITY (H): ICD-10-CM

## 2021-11-24 LAB — INR BLD: 3.1 (ref 0.9–1.1)

## 2021-11-24 PROCEDURE — 85610 PROTHROMBIN TIME: CPT

## 2021-11-24 PROCEDURE — 36416 COLLJ CAPILLARY BLOOD SPEC: CPT

## 2021-11-24 NOTE — PROGRESS NOTES
ANTICOAGULATION MANAGEMENT     Erich Craven 81 year old male is on warfarin with supratherapeutic INR result. (Goal INR 2.0-3.0)    Recent labs: (last 7 days)     11/24/21  0856   INR 3.1*       ASSESSMENT     Source(s): Chart Review and Patient/Caregiver Call       Warfarin doses taken: Warfarin taken as instructed    Diet: No new diet changes identified    New illness, injury, or hospitalization: Yes: Pt has chronic bilateral shoulder pain. Pt reports that he has an appointment at [place] in January for an evaluation.     Medication/supplement changes: Pt takes tramadol PRN for pain    Signs or symptoms of bleeding or clotting: No    Previous INR: Therapeutic last visit; previously outside of goal range    Additional findings: Pt reports that he is being followed by vascular for leg pain     PLAN     Recommended plan for ongoing change(s) affecting INR     Dosing Instructions: Partial hold then continue your current warfarin dose with next INR in 3 weeks   (Pt already took 5 mg of warfarin today-pt will take 2.5 mg tomorrow 11/25/21)    Summary  As of 11/24/2021    Full warfarin instructions:  11/25: 2.5 mg; Otherwise 5 mg every day   Next INR check:  12/16/2021             Telephone call with Erich who verbalizes understanding and agrees to plan    Lab visit scheduled    Education provided: Importance of notifying clinic for changes in medications; a sooner lab recheck maybe needed. and Contact [phone]  with any changes, questions or concerns.     Plan made per [facility] anticoagulation protocol    Delia Jang RN  Anticoagulation Clinic  11/24/2021    _______________________________________________________________________     Anticoagulation Episode Summary     Current INR goal:  2.0-3.0   TTR:  70.3 % (1 y)   Target end date:  Indefinite   Send INR reminders to:  SERENITY BECERRA    Indications    PE (pulmonary thromboembolism) (H) [I26.99]  Paroxysmal A-fib (H) [I48.0]  Deep vein thrombosis (DVT) of distal  vein of lower extremity  unspecified chronicity  unspecified laterality (H) [I82.4Z9]           Comments:           Anticoagulation Care Providers     Provider Role Specialty Phone number    Dontrell Gómez MD Referring Internal Medicine [phone]    Anne Heath MD Referring Internal Medicine [phone]

## 2021-12-06 ENCOUNTER — TRANSFERRED RECORDS (OUTPATIENT)
Dept: MULTI SPECIALTY CLINIC | Facility: CLINIC | Age: 81
End: 2021-12-06
Payer: MEDICARE

## 2021-12-06 LAB — RETINOPATHY: NORMAL

## 2021-12-10 PROBLEM — M25.512 CHRONIC PAIN OF BOTH SHOULDERS: Status: RESOLVED | Noted: 2021-09-27 | Resolved: 2021-12-10

## 2021-12-10 PROBLEM — M25.511 CHRONIC PAIN OF BOTH SHOULDERS: Status: RESOLVED | Noted: 2021-09-27 | Resolved: 2021-12-10

## 2021-12-10 PROBLEM — G89.29 CHRONIC PAIN OF BOTH SHOULDERS: Status: RESOLVED | Noted: 2021-09-27 | Resolved: 2021-12-10

## 2021-12-11 NOTE — PROGRESS NOTES
DISCHARGE REPORT    Erich did not return for further treatment after the last visit on 10/11/21.   Current status is unknown.  Please see information below for last known relevant information.  Patient seen for 2 visits.    SUBJECTIVE  Subjective changes noted by patient:  Feels injection is wearing off so shoulders have been a little more painful.  Most days able to do his exercises.  Feels some diminished range.    .  Current pain level is 6/10.     Previous pain level was  8/10.   Changes in function: (See Goal flowsheet attached for changes in current functional level)  Adverse reaction to treatment or activity: None    OBJECTIVE  Changes noted in objective findings:   Shoulder AROM flexion L 110, R 95, abduction 85B.      ASSESSMENT/PLAN  Diagnosis: B shoulder OA   Updated problem list and treatment plan:  Patient will continue to utilize HEP for any remaining deficits.   STG/LTGs have been met or progress has been made towards goals:  Please see goal flowsheet for most current information  Assessment of Progress: current status is unknown.    Last current status: Pt is progressing as expected   Self Management Plans:  HEP  I have re-evaluated this patient and find that the nature, scope, duration and intensity of the therapy is appropriate for the medical condition of the patient.  Erich continues to require the following intervention to meet STG and LTG's:  HEP.    Recommendations:  Discharge with current home program.  Patient to follow up with MD as needed.    Please refer to the daily flowsheet for treatment today, total treatment time and time spent performing 1:1 timed codes.

## 2021-12-15 ENCOUNTER — TELEPHONE (OUTPATIENT)
Dept: PALLIATIVE MEDICINE | Facility: CLINIC | Age: 81
End: 2021-12-15
Payer: MEDICARE

## 2021-12-15 NOTE — TELEPHONE ENCOUNTER
[facility] Call Center    Phone Message    May a detailed message be left on voicemail: yes     Reason for Call: Medication Refill Request    Has the patient contacted the pharmacy for the refill? Yes   Name of medication being requested: traMADol (ULTRAM) 50 MG tablet  Provider who prescribed the medication: Sarah Duarte APRN CNP  Pharmacy: [facility] [id number] [address]  Date medication is needed: asap    Patient requesting meds to be filled.      Action Taken: Message routed to:  [facility] Pain    Travel Screening: Not Applicable

## 2021-12-16 ENCOUNTER — TELEPHONE (OUTPATIENT)
Dept: ANESTHESIOLOGY | Facility: CLINIC | Age: 81
End: 2021-12-16

## 2021-12-16 ENCOUNTER — ANTICOAGULATION THERAPY VISIT (OUTPATIENT)
Dept: ANTICOAGULATION | Facility: CLINIC | Age: 81
End: 2021-12-16

## 2021-12-16 ENCOUNTER — LAB (OUTPATIENT)
Dept: LAB | Facility: CLINIC | Age: 81
End: 2021-12-16
Payer: MEDICARE

## 2021-12-16 DIAGNOSIS — I48.0 PAROXYSMAL A-FIB (H): ICD-10-CM

## 2021-12-16 DIAGNOSIS — M25.511 CHRONIC PAIN OF BOTH SHOULDERS: ICD-10-CM

## 2021-12-16 DIAGNOSIS — I26.99 PE (PULMONARY THROMBOEMBOLISM) (H): ICD-10-CM

## 2021-12-16 DIAGNOSIS — M25.512 CHRONIC PAIN OF BOTH SHOULDERS: ICD-10-CM

## 2021-12-16 DIAGNOSIS — I82.4Z9 DEEP VEIN THROMBOSIS (DVT) OF DISTAL VEIN OF LOWER EXTREMITY, UNSPECIFIED CHRONICITY, UNSPECIFIED LATERALITY (H): ICD-10-CM

## 2021-12-16 DIAGNOSIS — G89.29 CHRONIC PAIN OF BOTH SHOULDERS: ICD-10-CM

## 2021-12-16 DIAGNOSIS — I26.99 PE (PULMONARY THROMBOEMBOLISM) (H): Primary | ICD-10-CM

## 2021-12-16 LAB — INR BLD: 1.9 (ref 0.9–1.1)

## 2021-12-16 PROCEDURE — 85610 PROTHROMBIN TIME: CPT

## 2021-12-16 PROCEDURE — 36416 COLLJ CAPILLARY BLOOD SPEC: CPT

## 2021-12-16 RX ORDER — TRAMADOL HYDROCHLORIDE 50 MG/1
50 TABLET ORAL EVERY 6 HOURS PRN
Qty: 60 TABLET | Refills: 0 | Status: SHIPPED | OUTPATIENT
Start: 2021-12-23 | End: 2022-01-14

## 2021-12-16 NOTE — TELEPHONE ENCOUNTER
Refill request    Medication: traMADol (ULTRAM) 50 MG tablet      MNPMP Checked: Yes    Last refilled 11/23/21 for 60 tablets      Last clinic appointment: 11/5/21  Next clinic appointment: 2/14/22    Last Drug Screen Collected:  9/20/21  Controlled Substance Agreement signed: 9/20/21      Preferred pharmacy:    [facility] [address] [place] [address]    Per the providers last clinic note:     Jaun reports excellent pain benefit with Tramadol and notes this medication allows him to be more active. He was able to discontinue clonazepam without any difficulty. He reports no side effects from this medication. It is reasonable to continue Tramadol 50mg BID as needed for severe pain but I do not recommend dose increase.     Refill request routed to the provider on 12/16/21    Shani Shelton LPN

## 2021-12-16 NOTE — PROGRESS NOTES
ANTICOAGULATION MANAGEMENT     Erich Craven 81 year old male is on warfarin with therapeutic INR result. (Goal INR 2.0-3.0)    Recent labs: (last 7 days)     12/16/21  0813   INR 1.9*       ASSESSMENT     Source(s): Chart Review and Patient/Caregiver Call       Warfarin doses taken: Warfarin taken as instructed    Diet: No new diet changes identified    New illness, injury, or hospitalization: No    Medication/supplement changes: None noted    Signs or symptoms of bleeding or clotting: No    Previous INR: Supratherapeutic    Additional findings: None     PLAN     Recommended plan for no diet, medication or health factor changes affecting INR     Dosing Instructions: Booster dose then continue your current warfarin dose with next INR in 2 weeks       Summary  As of 12/16/2021    Full warfarin instructions:  12/16: 7.5 mg; Otherwise 5 mg every day   Next INR check:               Telephone call with Erich who verbalizes understanding and agrees to plan    Lab visit scheduled    Education provided: Goal range and significance of current result, Monitoring for bleeding signs and symptoms and Monitoring for clotting signs and symptoms    Plan made per [facility] anticoagulation protocol    Evita Lloyd RN  Anticoagulation Clinic  12/16/2021    _______________________________________________________________________     Anticoagulation Episode Summary     Current INR goal:  2.0-3.0   TTR:  75.3 % (1 y)   Target end date:  Indefinite   Send INR reminders to:  SERENITY BECERRA    Indications    PE (pulmonary thromboembolism) (H) [I26.99]  Paroxysmal A-fib (H) [I48.0]  Deep vein thrombosis (DVT) of distal vein of lower extremity  unspecified chronicity  unspecified laterality (H) [I82.4Z9]           Comments:           Anticoagulation Care Providers     Provider Role Specialty Phone number    Dontrell Gómez MD Referring Internal Medicine [phone]    Anne Heath MD Referring Internal Medicine [phone]

## 2021-12-16 NOTE — TELEPHONE ENCOUNTER
Signed Prescriptions:                        Disp   Refills    traMADol (ULTRAM) 50 MG tablet             60 tab*0        Sig: Take 1 tablet (50 mg) by mouth every 6 hours as           needed for severe pain Max of 2 tabs/day.  Authorizing Provider: LAURA SANTIZO      Reviewed [place] Prescription Monitoring Program- I did find a oxycodone prescription from a Dr. Thornton last month but could not find a message from Jaun updating our clinic of this prescription. Please advise Jaun that any opioid prescription he receives MUST be relayed to the clinic or I could discontinue prescribing of Tramadol.    VAMSI Zuluaga [facility] Pain Management

## 2021-12-23 ENCOUNTER — VIRTUAL VISIT (OUTPATIENT)
Dept: PHARMACY | Facility: CLINIC | Age: 81
End: 2021-12-23
Payer: COMMERCIAL

## 2021-12-23 DIAGNOSIS — M25.511 CHRONIC PAIN OF BOTH SHOULDERS: Primary | ICD-10-CM

## 2021-12-23 DIAGNOSIS — E78.49 OTHER HYPERLIPIDEMIA: ICD-10-CM

## 2021-12-23 DIAGNOSIS — I10 ESSENTIAL HYPERTENSION: ICD-10-CM

## 2021-12-23 DIAGNOSIS — J44.9 CHRONIC OBSTRUCTIVE PULMONARY DISEASE, UNSPECIFIED COPD TYPE (H): ICD-10-CM

## 2021-12-23 DIAGNOSIS — N40.0 BENIGN PROSTATIC HYPERPLASIA WITHOUT LOWER URINARY TRACT SYMPTOMS: ICD-10-CM

## 2021-12-23 DIAGNOSIS — G89.29 CHRONIC PAIN OF BOTH SHOULDERS: Primary | ICD-10-CM

## 2021-12-23 DIAGNOSIS — I25.10 CORONARY ARTERY DISEASE INVOLVING NATIVE HEART WITHOUT ANGINA PECTORIS, UNSPECIFIED VESSEL OR LESION TYPE: ICD-10-CM

## 2021-12-23 DIAGNOSIS — E03.9 HYPOTHYROIDISM, UNSPECIFIED TYPE: ICD-10-CM

## 2021-12-23 DIAGNOSIS — I26.99 PE (PULMONARY THROMBOEMBOLISM) (H): ICD-10-CM

## 2021-12-23 DIAGNOSIS — F41.9 ANXIETY: ICD-10-CM

## 2021-12-23 DIAGNOSIS — I48.0 PAROXYSMAL A-FIB (H): ICD-10-CM

## 2021-12-23 DIAGNOSIS — I50.33 ACUTE ON CHRONIC HEART FAILURE WITH PRESERVED EJECTION FRACTION (H): ICD-10-CM

## 2021-12-23 DIAGNOSIS — Z78.9 TAKES DIETARY SUPPLEMENTS: ICD-10-CM

## 2021-12-23 DIAGNOSIS — I82.4Z9 DEEP VEIN THROMBOSIS (DVT) OF DISTAL VEIN OF LOWER EXTREMITY, UNSPECIFIED CHRONICITY, UNSPECIFIED LATERALITY (H): ICD-10-CM

## 2021-12-23 DIAGNOSIS — M25.512 CHRONIC PAIN OF BOTH SHOULDERS: Primary | ICD-10-CM

## 2021-12-23 PROCEDURE — 99606 MTMS BY PHARM EST 15 MIN: CPT | Performed by: PHARMACIST

## 2021-12-23 PROCEDURE — 99607 MTMS BY PHARM ADDL 15 MIN: CPT | Performed by: PHARMACIST

## 2021-12-23 RX ORDER — VENLAFAXINE 37.5 MG/1
37.5 TABLET ORAL 2 TIMES DAILY
COMMUNITY
Start: 2021-10-27 | End: 2022-01-24

## 2021-12-23 RX ORDER — VENLAFAXINE HYDROCHLORIDE 37.5 MG/1
37.5 CAPSULE, EXTENDED RELEASE ORAL DAILY
COMMUNITY
End: 2021-12-23

## 2021-12-23 NOTE — PROGRESS NOTES
Medication Therapy Management (MTM) Encounter    ASSESSMENT:                            Medication Adherence/Access: No issues identified    Shoulder pain: Plan in place.    Anxiety/depression: Symptoms are currently stable.  Drug interaction between tramadol and sertraline would be increased risk of serotonin syndrome.  Unfortunately this risk is also present with venlafaxine which she has now been taking for several months.  No signs and symptoms of serotonin syndrome are present at this time, so he may remain on venlafaxine therapy.    Hypertension/A. Fib/HFpEF/H/o PE/DVT/CAD: Plan in place.    Hyperlipidemia: Stable.    COPD: Stable.    Hypothyroidism: Stable.  Last TSH was within normal limits.    BPH: Stable.    Supplements: No medical indication for acetyl-L-carnitine, taurine or CoQ10 - he could stop taking these.  I agree with continuing daily multivitamin.    PLAN:                            1.  Advised he could remain off acetyl l-carnitine and taurine.  2.  Also recommended stopping CoQ10.    Follow-up: Return in about 6 months (around 6/23/2022) for Follow-up Medication Review.    SUBJECTIVE/OBJECTIVE:                          Erich Craven is a 81 year old male contacted via secure video for a follow-up visit. He was referred to me from Dr. Heath.  Today's visit is a follow-up MTM visit from 6/16/2021 and [employer] correspondence between now and then.     Reason for visit: Routine follow-up.    Tobacco: He reports that he has quit smoking. He has a 12.50 pack-year smoking history. He has never used smokeless tobacco.  Alcohol: history of alcohol dependence - sober since 1989    Medication Adherence/Access: no issues reported    Shoulder Pain:  Jaun has decided to postpone shoulder surgery until spring.  He's working with the pain clinic regarding pain management.  Jaun is scheduled to go to [place] for cardiac follow-up, and will also see orthopedics while he's there for a second opinion.  Current  medication regimen includes acetaminophen 1000 mg 4 times daily, lidocaine cream as needed and tramadol 50 mg every 6 hours as needed (with a max of 2 tablets/day).  He reports pain is stable with this regimen, and he is also found just simply resting his shoulder is quite helpful.  No side effects reported.  Jaun is aware of maximum acetaminophen dose per day from all sources.    Anxiety/Depression: When Jaun was started on tramadol, he reports he was told to stop sertraline.  At that time he resumed venlafaxine 37.5 mg twice daily - this was several months ago.  He reports his mood has been stable, and really notices no change in symptoms between sertraline and venlafaxine use.  He has been off clonazepam for several months now.  He denies suicidal ideation.  He would prefer to just remain on venlafaxine at this time.    Hypertension/A. Fib/HFpEF/H/o PE/DVT/CAD:  Patient has history of coronary artery disease with stenting of the proximal left anterior descending artery and also stenting of the proximal to mid right coronary artery in 2014.  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  As noted above, patient has an upcoming follow-up with [facility].    Current medications include aspirin 81mg daily, warfarin as directed, carvedilol 12.5mg twice daily, diltiazem ER 300mg daily, furosemide 40 mg every morning and 20 mg every afternoon, Imdur 30mg daily, lisinopril 20mg daily, metolazone 2.5mg twice daily and spironolactone 25mg every other day.  Patient does not self-monitor blood pressure.  Patient reports no current medication side effects including signs and symptoms bruising/bleeding.    Last EF 2/27/19 - 66%  INR   Date Value Ref Range Status   12/16/2021 1.9 (H) 0.9 - 1.1 Final   06/14/2021 2.80 (H) 0.86 - 1.14 Final     Comment:     This test is intended for monitoring Coumadin therapy.  Results are not   accurate in patients with prolonged INR due to factor deficiency.         BP Readings from Last 3  Encounters:   10/20/21 105/67   09/23/21 126/72   08/26/21 133/74      Potassium   Date Value Ref Range Status   10/28/2021 5.2 3.4 - 5.3 mmol/L Final   06/21/2021 4.1 3.4 - 5.3 mmol/L Final      Hyperlipidemia: Current therapy includes atorvastatin 40mg daily.  Patient reports no significant myalgias or other side effects.  Recent Labs   Lab Test 10/20/21  1022 02/19/21  1528   CHOL 126 138   HDL 48 44   LDL 64 52   TRIG 70 209*     COPD: Current medications: albuterol MDI as needed (no recent use) and Trelegy Ellipta daily.    Patient is not experiencing side effects.   Patient reports the following symptoms: none.  Patient does have an COPD Action Plan on file.   Has spirometry been completed: Yes     Hypothyroidism: Patient is taking levothyroxine 175 mcg daily Monday through Saturday and 1 & 1/2 tablets on Sundays. Patient is having the following symptoms: none.   TSH   Date Value Ref Range Status   10/20/2021 0.50 0.40 - 4.00 mU/L Final   06/14/2021 0.71 0.40 - 4.00 mU/L Final      BPH: Patient is taking tamsulosin 0.4 mg daily.  He reports urinary symptoms are stable.  No side effects reported.    Supplements: Jaun expresses concern about the cost of his supplements.  He is currently out of acetyl l-carnitine and taurine and he has noticed no difference is off of them.  He had also started co-Q10 per recommendation of a naturopath, but has never had myalgias or any strong indication to take this.  He does take a multivitamin, because of this is manageable and he prefers to continue.    Today's Vitals: There were no vitals taken for this visit.  ----------------    I spent 19 minutes with this patient today. A copy of the visit note was provided to the patient's primary care provider.    The patient was sent via [employer] a summary of these recommendations.     Kassi Glez, PharmD, BCACP  Medication Therapy Management Provider  Pager: [phone]     Telemedicine Visit Details  Type of service:  Video  Conference via [employer]  Start Time: 10:10 AM  End Time: 10:29 AM  Originating Location (patient location): Home  Distant Location (provider location):  [facility]     Medication Therapy Recommendations  akes dietary supplements    Current Medication: co-enzyme Q-10 100 MG CAPS capsule   Rationale: No medical indication at this time - Unnecessary medication therapy - Indication   Recommendation: Discontinue Medication   Status: Accepted - no CPA Needed

## 2021-12-27 NOTE — PATIENT INSTRUCTIONS
Recommendations from today's MTM visit:                                                    MTM (medication therapy management) is a service provided by a clinical pharmacist designed to help you get the most of out of your medicines.   Today we reviewed what your medicines are for, how to know if they are working, that your medicines are safe and how to make your medicine regimen as easy as possible.      1.  You can remain off acetyl-L-carnitine and taurine.  2.  You can also stop taking CoQ10    Follow-up: Return in about 6 months (around 6/23/2022) for Follow-up Medication Review.    It was great to speak with you today.  I value your experience and would be very thankful for your time with providing feedback on our clinic survey. You may receive a survey via email or text message in the next few days.     To schedule another MTM appointment, please call the clinic directly or you may call the MTM scheduling line at [phone] or toll-free at [phone].     My Clinical Pharmacist's contact information:                                                      Please feel free to contact me with any questions or concerns you have.      Kassi Glez, PharmD, [facility]  Medication Therapy Management Provider  Pager: [phone]     Louann Lamar, Masood  Medication Therapy Management Resident  Pager: [phone]

## 2021-12-29 DIAGNOSIS — E78.5 HYPERLIPIDEMIA LDL GOAL <100: ICD-10-CM

## 2021-12-31 RX ORDER — ATORVASTATIN CALCIUM 40 MG/1
TABLET, FILM COATED ORAL
Qty: 90 TABLET | Refills: 2 | Status: SHIPPED | OUTPATIENT
Start: 2021-12-31 | End: 2022-10-04

## 2022-01-03 ENCOUNTER — ANCILLARY PROCEDURE (OUTPATIENT)
Dept: CARDIOLOGY | Facility: CLINIC | Age: 82
End: 2022-01-03
Attending: INTERNAL MEDICINE
Payer: MEDICARE

## 2022-01-03 DIAGNOSIS — Z95.0 CARDIAC PACEMAKER IN SITU: ICD-10-CM

## 2022-01-03 PROCEDURE — 93296 REM INTERROG EVL PM/IDS: CPT | Performed by: INTERNAL MEDICINE

## 2022-01-03 PROCEDURE — 93294 REM INTERROG EVL PM/LDLS PM: CPT | Performed by: INTERNAL MEDICINE

## 2022-01-04 LAB
MDC_IDC_EPISODE_DTM: NORMAL
MDC_IDC_EPISODE_DURATION: 11 S
MDC_IDC_EPISODE_ID: NORMAL
MDC_IDC_EPISODE_TYPE: NORMAL
MDC_IDC_LEAD_IMPLANT_DT: NORMAL
MDC_IDC_LEAD_IMPLANT_DT: NORMAL
MDC_IDC_LEAD_LOCATION: NORMAL
MDC_IDC_LEAD_LOCATION: NORMAL
MDC_IDC_LEAD_LOCATION_DETAIL_1: NORMAL
MDC_IDC_LEAD_LOCATION_DETAIL_1: NORMAL
MDC_IDC_LEAD_MFG: NORMAL
MDC_IDC_LEAD_MFG: NORMAL
MDC_IDC_LEAD_MODEL: NORMAL
MDC_IDC_LEAD_MODEL: NORMAL
MDC_IDC_LEAD_POLARITY_TYPE: NORMAL
MDC_IDC_LEAD_POLARITY_TYPE: NORMAL
MDC_IDC_LEAD_SERIAL: NORMAL
MDC_IDC_LEAD_SERIAL: NORMAL
MDC_IDC_MSMT_BATTERY_DTM: NORMAL
MDC_IDC_MSMT_BATTERY_REMAINING_LONGEVITY: 30 MO
MDC_IDC_MSMT_BATTERY_REMAINING_PERCENTAGE: 50 %
MDC_IDC_MSMT_BATTERY_STATUS: NORMAL
MDC_IDC_MSMT_LEADCHNL_RA_IMPEDANCE_VALUE: 463 OHM
MDC_IDC_MSMT_LEADCHNL_RA_PACING_THRESHOLD_AMPLITUDE: 0.6 V
MDC_IDC_MSMT_LEADCHNL_RA_PACING_THRESHOLD_PULSEWIDTH: 0.4 MS
MDC_IDC_MSMT_LEADCHNL_RV_IMPEDANCE_VALUE: 446 OHM
MDC_IDC_MSMT_LEADCHNL_RV_PACING_THRESHOLD_AMPLITUDE: 2 V
MDC_IDC_MSMT_LEADCHNL_RV_PACING_THRESHOLD_PULSEWIDTH: 0.4 MS
MDC_IDC_PG_IMPLANT_DTM: NORMAL
MDC_IDC_PG_MFG: NORMAL
MDC_IDC_PG_MODEL: NORMAL
MDC_IDC_PG_SERIAL: NORMAL
MDC_IDC_PG_TYPE: NORMAL
MDC_IDC_SESS_CLINIC_NAME: NORMAL
MDC_IDC_SESS_DTM: NORMAL
MDC_IDC_SESS_TYPE: NORMAL
MDC_IDC_SET_BRADY_AT_MODE_SWITCH_MODE: NORMAL
MDC_IDC_SET_BRADY_AT_MODE_SWITCH_RATE: 150 {BEATS}/MIN
MDC_IDC_SET_BRADY_LOWRATE: 60 {BEATS}/MIN
MDC_IDC_SET_BRADY_MAX_SENSOR_RATE: 130 {BEATS}/MIN
MDC_IDC_SET_BRADY_MAX_TRACKING_RATE: 130 {BEATS}/MIN
MDC_IDC_SET_BRADY_MODE: NORMAL
MDC_IDC_SET_BRADY_PAV_DELAY_HIGH: 220 MS
MDC_IDC_SET_BRADY_PAV_DELAY_LOW: 250 MS
MDC_IDC_SET_BRADY_SAV_DELAY_HIGH: 220 MS
MDC_IDC_SET_BRADY_SAV_DELAY_LOW: 250 MS
MDC_IDC_SET_LEADCHNL_RA_PACING_AMPLITUDE: 2 V
MDC_IDC_SET_LEADCHNL_RA_PACING_POLARITY: NORMAL
MDC_IDC_SET_LEADCHNL_RA_PACING_PULSEWIDTH: 0.4 MS
MDC_IDC_SET_LEADCHNL_RA_SENSING_ADAPTATION_MODE: NORMAL
MDC_IDC_SET_LEADCHNL_RA_SENSING_POLARITY: NORMAL
MDC_IDC_SET_LEADCHNL_RA_SENSING_SENSITIVITY: 0.5 MV
MDC_IDC_SET_LEADCHNL_RV_PACING_AMPLITUDE: 2.3 V
MDC_IDC_SET_LEADCHNL_RV_PACING_CAPTURE_MODE: NORMAL
MDC_IDC_SET_LEADCHNL_RV_PACING_POLARITY: NORMAL
MDC_IDC_SET_LEADCHNL_RV_PACING_PULSEWIDTH: 0.4 MS
MDC_IDC_SET_LEADCHNL_RV_SENSING_ADAPTATION_MODE: NORMAL
MDC_IDC_SET_LEADCHNL_RV_SENSING_POLARITY: NORMAL
MDC_IDC_SET_LEADCHNL_RV_SENSING_SENSITIVITY: 1 MV
MDC_IDC_SET_ZONE_DETECTION_INTERVAL: 375 MS
MDC_IDC_SET_ZONE_TYPE: NORMAL
MDC_IDC_SET_ZONE_VENDOR_TYPE: NORMAL
MDC_IDC_STAT_AT_BURDEN_PERCENT: 1 %
MDC_IDC_STAT_AT_DTM_END: NORMAL
MDC_IDC_STAT_AT_DTM_START: NORMAL
MDC_IDC_STAT_BRADY_DTM_END: NORMAL
MDC_IDC_STAT_BRADY_DTM_START: NORMAL
MDC_IDC_STAT_BRADY_RA_PERCENT_PACED: 13 %
MDC_IDC_STAT_BRADY_RV_PERCENT_PACED: 0 %
MDC_IDC_STAT_EPISODE_RECENT_COUNT: 0
MDC_IDC_STAT_EPISODE_RECENT_COUNT: 0
MDC_IDC_STAT_EPISODE_RECENT_COUNT: 10
MDC_IDC_STAT_EPISODE_RECENT_COUNT: 3
MDC_IDC_STAT_EPISODE_RECENT_COUNT_DTM_END: NORMAL
MDC_IDC_STAT_EPISODE_RECENT_COUNT_DTM_START: NORMAL
MDC_IDC_STAT_EPISODE_TYPE: NORMAL
MDC_IDC_STAT_EPISODE_VENDOR_TYPE: NORMAL

## 2022-01-10 ENCOUNTER — TELEPHONE (OUTPATIENT)
Dept: ANESTHESIOLOGY | Facility: CLINIC | Age: 82
End: 2022-01-10
Payer: MEDICARE

## 2022-01-14 ENCOUNTER — TELEPHONE (OUTPATIENT)
Dept: ANESTHESIOLOGY | Facility: CLINIC | Age: 82
End: 2022-01-14
Payer: MEDICARE

## 2022-01-14 DIAGNOSIS — M25.511 CHRONIC PAIN OF BOTH SHOULDERS: ICD-10-CM

## 2022-01-14 DIAGNOSIS — G89.29 CHRONIC PAIN OF BOTH SHOULDERS: ICD-10-CM

## 2022-01-14 DIAGNOSIS — M25.512 CHRONIC PAIN OF BOTH SHOULDERS: ICD-10-CM

## 2022-01-14 RX ORDER — TRAMADOL HYDROCHLORIDE 50 MG/1
50 TABLET ORAL EVERY 6 HOURS PRN
Qty: 60 TABLET | Refills: 0 | Status: SHIPPED | OUTPATIENT
Start: 2022-01-19 | End: 2022-02-10

## 2022-01-20 ENCOUNTER — TRANSFERRED RECORDS (OUTPATIENT)
Dept: HEALTH INFORMATION MANAGEMENT | Facility: CLINIC | Age: 82
End: 2022-01-20
Payer: MEDICARE

## 2022-01-31 ENCOUNTER — TRANSFERRED RECORDS (OUTPATIENT)
Dept: HEALTH INFORMATION MANAGEMENT | Facility: CLINIC | Age: 82
End: 2022-01-31

## 2022-01-31 ENCOUNTER — MYC MEDICAL ADVICE (OUTPATIENT)
Dept: FAMILY MEDICINE | Facility: CLINIC | Age: 82
End: 2022-01-31

## 2022-02-02 NOTE — TELEPHONE ENCOUNTER
I am not sure what the question Jaun has, he has an order for renal put on the November 22.    Metolazone does affect kidney function and electrolytes, any change in dose means we need to monitor renal panel.  Thank you Dr. Heath

## 2022-02-02 NOTE — TELEPHONE ENCOUNTER
Spoke to patient and future lab orders are in the chart. He has a lab appointment this Friday.     Tiffany Singh RN  -[facility]

## 2022-02-02 NOTE — TELEPHONE ENCOUNTER
Dr. Heath, please see patients my chart message.     Tiffany Singh RN  [facility]

## 2022-02-04 ENCOUNTER — ANTICOAGULATION THERAPY VISIT (OUTPATIENT)
Dept: ANTICOAGULATION | Facility: CLINIC | Age: 82
End: 2022-02-04

## 2022-02-04 ENCOUNTER — TELEPHONE (OUTPATIENT)
Dept: FAMILY MEDICINE | Facility: CLINIC | Age: 82
End: 2022-02-04

## 2022-02-04 ENCOUNTER — LAB (OUTPATIENT)
Dept: LAB | Facility: CLINIC | Age: 82
End: 2022-02-04
Payer: MEDICARE

## 2022-02-04 DIAGNOSIS — D63.1 ANEMIA IN STAGE 3A CHRONIC KIDNEY DISEASE (H): ICD-10-CM

## 2022-02-04 DIAGNOSIS — E11.9 DIABETES MELLITUS, TYPE 2 (H): ICD-10-CM

## 2022-02-04 DIAGNOSIS — I48.0 PAROXYSMAL A-FIB (H): ICD-10-CM

## 2022-02-04 DIAGNOSIS — I82.4Z9 DEEP VEIN THROMBOSIS (DVT) OF DISTAL VEIN OF LOWER EXTREMITY, UNSPECIFIED CHRONICITY, UNSPECIFIED LATERALITY (H): ICD-10-CM

## 2022-02-04 DIAGNOSIS — I26.99 PE (PULMONARY THROMBOEMBOLISM) (H): ICD-10-CM

## 2022-02-04 DIAGNOSIS — I26.99 PE (PULMONARY THROMBOEMBOLISM) (H): Primary | ICD-10-CM

## 2022-02-04 DIAGNOSIS — N18.31 ANEMIA IN STAGE 3A CHRONIC KIDNEY DISEASE (H): ICD-10-CM

## 2022-02-04 DIAGNOSIS — Z79.01 LONG TERM CURRENT USE OF ANTICOAGULANTS WITH INR GOAL OF 2.0-3.0: ICD-10-CM

## 2022-02-04 DIAGNOSIS — N18.31 CHRONIC KIDNEY DISEASE, STAGE 3A (H): ICD-10-CM

## 2022-02-04 DIAGNOSIS — Z79.01 LONG TERM CURRENT USE OF ANTICOAGULANTS WITH INR GOAL OF 2.0-3.0: Primary | ICD-10-CM

## 2022-02-04 LAB
ALBUMIN SERPL-MCNC: 3.4 G/DL (ref 3.4–5)
ANION GAP SERPL CALCULATED.3IONS-SCNC: 6 MMOL/L (ref 3–14)
BUN SERPL-MCNC: 29 MG/DL (ref 7–30)
CALCIUM SERPL-MCNC: 9.3 MG/DL (ref 8.5–10.1)
CHLORIDE BLD-SCNC: 96 MMOL/L (ref 94–109)
CO2 SERPL-SCNC: 31 MMOL/L (ref 20–32)
CREAT SERPL-MCNC: 0.99 MG/DL (ref 0.66–1.25)
CREAT UR-MCNC: 24 MG/DL
GFR SERPL CREATININE-BSD FRML MDRD: 76 ML/MIN/1.73M2
GLUCOSE BLD-MCNC: 109 MG/DL (ref 70–99)
HBA1C MFR BLD: 5.6 % (ref 0–5.6)
HGB BLD-MCNC: 12.8 G/DL (ref 13.3–17.7)
INR BLD: 2 (ref 0.9–1.1)
PHOSPHATE SERPL-MCNC: 3.2 MG/DL (ref 2.5–4.5)
POTASSIUM BLD-SCNC: 3.5 MMOL/L (ref 3.4–5.3)
PROT UR-MCNC: 0.07 G/L
PROT/CREAT 24H UR: 0.29 G/G CR (ref 0–0.2)
SODIUM SERPL-SCNC: 133 MMOL/L (ref 133–144)

## 2022-02-04 PROCEDURE — 80069 RENAL FUNCTION PANEL: CPT

## 2022-02-04 PROCEDURE — 84156 ASSAY OF PROTEIN URINE: CPT

## 2022-02-04 PROCEDURE — 36416 COLLJ CAPILLARY BLOOD SPEC: CPT

## 2022-02-04 PROCEDURE — 36415 COLL VENOUS BLD VENIPUNCTURE: CPT

## 2022-02-04 PROCEDURE — 85610 PROTHROMBIN TIME: CPT

## 2022-02-04 PROCEDURE — 83036 HEMOGLOBIN GLYCOSYLATED A1C: CPT

## 2022-02-04 PROCEDURE — 85018 HEMOGLOBIN: CPT

## 2022-02-04 NOTE — PROGRESS NOTES
No answer when calling patient with results, no prompt for vm. Sent MyC message for patient to return call to [facility] RN for assessment and scheduling of next INR. If no response, will try calling Jaun again later today.  Paola BONDS RN  Anticoagulation Team

## 2022-02-04 NOTE — TELEPHONE ENCOUNTER
ANTICOAGULATION MANAGEMENT      Erich Craven due for annual renewal of referral to anticoagulation monitoring. Order pended for your review and signature.      ANTICOAGULATION SUMMARY      Warfarin indication(s)     Atrial fibrillation  DVT  PE    Heart valve present?  NO       Current goal range   INR: 2.0-3.0     Goal appropriate for indication? Yes, INR 2-3 appropriate for hx of DVT, PE, hypercoagulable state, Afib, LVAD, or bileaflet AVR without risk factors     Current duration of therapy Indefinite/long term therapy   Time in Therapeutic Range (TTR)  (Goal > 60%) 75.3%       Office visit with referring provider's group within last year yes on 10/20/21       Paola Bee RN

## 2022-02-04 NOTE — PROGRESS NOTES
ANTICOAGULATION MANAGEMENT     Erich Craven 81 year old male is on warfarin with therapeutic INR result. (Goal INR 2.0-3.0)    Recent labs: (last 7 days)     02/04/22  0902   INR 2.0*       ASSESSMENT     Source(s): Chart Review and Patient/Caregiver Call       Warfarin doses taken: Warfarin taken as instructed    Diet: No new diet changes identified    New illness, injury, or hospitalization: No    Medication/supplement changes: None noted    Signs or symptoms of bleeding or clotting: No    Previous INR: Subtherapeutic    Additional findings: None     PLAN     Recommended plan for no diet, medication or health factor changes affecting INR     Dosing Instructions: Continue your current warfarin dose with next INR in 4 weeks       Summary  As of 2/4/2022    Full warfarin instructions:  5 mg every day   Next INR check:  3/3/2022             Telephone call with Erich who verbalizes understanding and agrees to plan    Lab visit scheduled    Education provided: Please call back if any changes to your diet, medications or how you've been taking warfarin and Contact [phone]  with any changes, questions or concerns.     Plan made per [facility] anticoagulation protocol    Paola Bee RN  Anticoagulation Clinic  2/4/2022    _______________________________________________________________________     Anticoagulation Episode Summary     Current INR goal:  2.0-3.0   TTR:  75.3 % (1 y)   Target end date:  Indefinite   Send INR reminders to:  KAMRANAG HERNAN    Indications    PE (pulmonary thromboembolism) (H) [I26.99]  Paroxysmal A-fib (H) [I48.0]  Deep vein thrombosis (DVT) of distal vein of lower extremity  unspecified chronicity  unspecified laterality (H) [I82.4Z9]  Long term current use of anticoagulants with INR goal of 2.0-3.0 [Z79.01]           Comments:           Anticoagulation Care Providers     Provider Role Specialty Phone number    Dontrell Gómez MD Referring Internal Medicine [phone]    [facility]  Anne García MD [facility] Internal Medicine [phone]

## 2022-02-07 ASSESSMENT — ANXIETY QUESTIONNAIRES
6. BECOMING EASILY ANNOYED OR IRRITABLE: NOT AT ALL
7. FEELING AFRAID AS IF SOMETHING AWFUL MIGHT HAPPEN: NOT AT ALL
GAD7 TOTAL SCORE: 0
5. BEING SO RESTLESS THAT IT IS HARD TO SIT STILL: NOT AT ALL
7. FEELING AFRAID AS IF SOMETHING AWFUL MIGHT HAPPEN: NOT AT ALL
4. TROUBLE RELAXING: NOT AT ALL
1. FEELING NERVOUS, ANXIOUS, OR ON EDGE: NOT AT ALL
2. NOT BEING ABLE TO STOP OR CONTROL WORRYING: NOT AT ALL
3. WORRYING TOO MUCH ABOUT DIFFERENT THINGS: NOT AT ALL
GAD7 TOTAL SCORE: 0

## 2022-02-08 DIAGNOSIS — I89.0 LYMPHEDEMA: ICD-10-CM

## 2022-02-08 ASSESSMENT — ANXIETY QUESTIONNAIRES: GAD7 TOTAL SCORE: 0

## 2022-02-10 ENCOUNTER — VIRTUAL VISIT (OUTPATIENT)
Dept: NEPHROLOGY | Facility: CLINIC | Age: 82
End: 2022-02-10
Payer: MEDICARE

## 2022-02-10 DIAGNOSIS — N18.31 CHRONIC KIDNEY DISEASE, STAGE 3A (H): Primary | ICD-10-CM

## 2022-02-10 DIAGNOSIS — M25.512 CHRONIC PAIN OF BOTH SHOULDERS: ICD-10-CM

## 2022-02-10 DIAGNOSIS — M25.511 CHRONIC PAIN OF BOTH SHOULDERS: ICD-10-CM

## 2022-02-10 DIAGNOSIS — G89.29 CHRONIC PAIN OF BOTH SHOULDERS: ICD-10-CM

## 2022-02-10 PROCEDURE — 99214 OFFICE O/P EST MOD 30 MIN: CPT | Mod: 95

## 2022-02-10 PROCEDURE — G0463 HOSPITAL OUTPT CLINIC VISIT: HCPCS | Mod: PN,RTG

## 2022-02-10 RX ORDER — TRAMADOL HYDROCHLORIDE 50 MG/1
50 TABLET ORAL EVERY 6 HOURS PRN
Qty: 60 TABLET | Refills: 0 | Status: SHIPPED | OUTPATIENT
Start: 2022-02-14 | End: 2022-03-08

## 2022-02-10 RX ORDER — SPIRONOLACTONE 25 MG/1
25 TABLET ORAL EVERY OTHER DAY
Qty: 45 TABLET | Refills: 2 | Status: SHIPPED | OUTPATIENT
Start: 2022-02-10 | End: 2022-10-04

## 2022-02-10 NOTE — PROGRESS NOTES
Nephrology Video Visit 2/10/22    Assessment and Plan:    1. CKD3a/b w/mild proteinuria - Improved? Dilutional? Decreased following reduction in ACE? Creat 0.9, eGFR 76 ml/mn, UPCR 0.2 g/gCr  Baseline creat had been running mid to upper 1's since 8/20 w/recurrent DONA but ACE dose was cut in half 10/21 and creat has been declining since.    - Etiology of his CKD likely HTN/DONA   - Renal US normal   - Does not use NSAIDs   - Blood pressure at goal to soft   - DM well controlled with A1c of 5.6 % ( 2/22)   - He is on statin for CV risk reduction    2. CV/HTN/Pafib, Diastolic HF - Currently well controlled. Has very mild end of day edema. No dyspnea. Weight 277#, down from 292# in 6/21. Home b/ps now in the teens/.  Has not brought in his b/p device to be checked for accuracy. Echo 1/22 showing EF 67%, dilated IVC. He is on triple diuretic therapy. Follows with Dr Osuna, [facility]  Current regimen:    Coreg 12.5 mg bid   Lasix 40 mg AM, 20 mg PM   Spironolactone 25 mg every other day   Lisinopril 20 mg every day   Diltiazem  mg every day   Imdur 30 mg every day   Metolazone 2.5 mg bid    - Continue current regimen     3. Electrolytes - No acute concerns but does have a tendency toward mild hyponatremia ( Na 133).     - He is on Metolazone which can cause hyponatremia so will need to trend    4. Acid base - No acute concerns. Bicarb 31    5. BMD - Ca 9.3 Phos 3.2 albumin 3.4   Vit D 34,  ( 6/21)   Not currently on Vit D    6. BPH - Controlled with Flomax    7. Anemia - Hgb 12.8   - Iron studies 10/21: Ferritin 81 Fe 60 IS 19   - He does not require iron/LEIGH at this time    8. Disposition - RTC 4 months for follow up w/labs prior    Assessment and plan was discussed with patient and he voiced his understanding and agreement.    Reason for Visit:  CKD/HTN    HPI:  Mr Craven is an 80 yo male with CKDa/b, COPD, RIN on BiPAP, Diastolic HF, SSS s/p PPM, CAD, P-Afib, HTN, BPH, h/o Pulmonary embolism, present today  for CKD follow up. Last seen in clinic by me on 11/2/21  Baseline creat mid to upper 1's    ROS:   No complaints  Reports increased edema recently due to a med change, but has resumed his prior regimen with improvement in edema    Using Bipap at HS for his RIN  Home blood pressures teens/ . Denies lightheadedness/syncope  Has completed his COVID vax series/booster and Flu vax  Denies CP/dyspnea  No abdominal concerns  Voiding w/o complication. On Flomax  Appetite is good. Has been a vegetarian for years  Walking with his dog regularly    Chronic Health Problems:    CKD3b  COPD  IRN on CPAP  Diastolic HF  SSS s/p PPM  CAD  P-Afib  h/o Pulmonary embolism  HTN  Hypothyroidism  Anxiety  BPH  HLD    Family Hx:   Family History   Problem Relation Age of Onset     Hypertension Father      Personal Hx:   , lives with his puppy Clayton. NS    Allergies:  Allergies   Allergen Reactions     No Known Allergies        Medications:  Current Outpatient Medications   Medication Sig     acetaminophen (TYLENOL) 500 MG tablet Take 1,000 mg by mouth 4 times daily      aspirin (ASA) 81 MG chewable tablet Take 1 tablet by mouth daily     atorvastatin (LIPITOR) 40 MG tablet TAKE 1 TABLET BY MOUTH EVERY EVENING     carvedilol (COREG) 12.5 MG tablet TAKE 1 TABLET BY MOUTH TWICE A DAY     co-enzyme Q-10 100 MG CAPS capsule Take 100 mg by mouth daily     diltiazem ER COATED BEADS (CARDIZEM CD/CARTIA XT) 300 MG 24 hr capsule TAKE 1 CAPSULE BY MOUTH EVERY DAY     Fluticasone-Umeclidin-Vilanterol (TRELEGY ELLIPTA) 200-62.5-25 MCG/INH oral inhaler Inhale 1 puff into the lungs daily     furosemide (LASIX) 20 MG tablet Take 2 tabs in morning and 1 tab in the sfternoon     levothyroxine (SYNTHROID/LEVOTHROID) 175 MCG tablet Take 1 tablet (175 mcg) by mouth daily Additional 1/2 tablet on Sundays     lisinopril (ZESTRIL) 20 MG tablet Take 1 tablet (20 mg) by mouth daily     metolazone (ZAROXOLYN) 2.5 MG tablet Take 1 tablet (2.5 mg) by mouth 2  times daily     Multiple vitamin TABS Take 1 tablet by mouth daily     order for DME Oxygen 2 Li/min  Continuous. At night bled into PAP device. In the daytime, NC with bubbler: requiring portability     tamsulosin (FLOMAX) 0.4 MG capsule Take 1 capsule (0.4 mg) by mouth daily     venlafaxine (EFFEXOR) 37.5 MG tablet TAKE 1 TABLET BY MOUTH TWICE DAILY.     warfarin ANTICOAGULANT (COUMADIN) 5 MG tablet Take 1.5 tablets (7.5 mg) on Fridays and take 1 tablet (5 mg) all other days or as directed by the INR clinic     albuterol (PROAIR HFA/PROVENTIL HFA/VENTOLIN HFA) 108 (90 Base) MCG/ACT inhaler Inhale 2 puffs into the lungs every 4 hours as needed for shortness of breath / dyspnea or wheezing (Patient not taking: Reported on 12/23/2021)     isosorbide mononitrate (IMDUR) 30 MG 24 hr tablet TAKE 1 TABLET BY MOUTH EVERY DAY     lidocaine (LMX4) 4 % external cream Apply topically as needed for mild pain     spironolactone (ALDACTONE) 25 MG tablet Take 1 tablet (25 mg) by mouth every other day     [START ON 2/14/2022] traMADol (ULTRAM) 50 MG tablet Take 1 tablet (50 mg) by mouth every 6 hours as needed for severe pain Max of 2 tabs/day.     No current facility-administered medications for this visit.      Vitals:  B/P 134/66, Wt 275#    Exam:  GEN: Pleasant male in NAD  RESP: Breathing is non labored. Speaking in full sentences  NEURO: A/O  PSYCH: Bright affect, engaging     LABS:   CMP  Recent Labs   Lab Test 02/04/22  0902 10/28/21  1118 10/20/21  1022 09/23/21  1349 08/31/21  1401 06/21/21  1454 06/14/21  1455 04/21/21  0931 03/02/21  1405    132* 135  --  137 138 137 138 136   POTASSIUM 3.5 5.2 5.2  --  4.2 4.1 4.7 3.7 4.5   CHLORIDE 96 102 101  --  103 103 105 99 104   CO2 31 25 23  --  28 29 28 39* 28   ANIONGAP 6 5 11  --  6 6 4 <1* 4   * 95 92 89 127* 100* 108* 104* 93   BUN 29 47* 68*  --  45* 64* 62* 44* 57*   CR 0.99 1.39* 1.85*  --  1.07 1.57* 1.81* 1.02 1.42*   GFRESTIMATED 76 47* 33*  --  64 40*  34* 68 46*   GFRESTBLACK  --   --   --   --   --  47* 39* 79 53*   LAWSON 9.3 9.0 9.3  --  9.0 9.4 9.2 9.2 9.6     Recent Labs   Lab Test 10/20/21  1022 02/19/21  1528 08/11/20  1343 07/14/20  1532 01/22/20  1216   BILITOTAL 0.4 0.4 0.5  --  0.5   ALKPHOS 108 107 104  --  112   ALT 20 26 49 45 45   AST 11 14 31  --  25     CBC  Recent Labs   Lab Test 02/04/22  0902 10/28/21  1118 10/20/21  1022 06/14/21  1455 03/24/21  1805 03/04/21  1932   HGB 12.8* 12.6* 12.5* 12.2* 11.9* 11.4*   WBC  --   --  10.0 10.2 10.4 10.4   RBC  --   --  3.78* 3.68* 3.65* 3.49*   HCT  --   --  38.1* 36.7* 37.3* 36.1*   MCV  --   --  101* 100 102* 103*   MCH  --   --  33.1* 33.2* 32.6 32.7   MCHC  --   --  32.8 33.2 31.9 31.6   RDW  --   --  17.0* 15.6* 15.8* 15.7*   PLT  --   --  224 195 208 185     URINE STUDIES  Recent Labs   Lab Test 06/14/21  1516 04/21/21  0853 12/21/20  1219 12/14/20  1706 08/11/20  1344   COLOR Yellow Yellow Yellow Yellow Yellow   APPEARANCE Cloudy Clear Clear Clear Clear   URINEGLC Negative Negative Negative Negative Negative   URINEBILI Negative Negative Negative Negative Negative   URINEKETONE Negative Negative Negative Negative Negative   SG 1.020 1.015 1.015 1.015 1.020   UBLD Small* Negative Negative Trace* Negative   URINEPH 5.0 7.0 5.0 6.0 6.5   PROTEIN 30* Negative Negative 30* Negative   UROBILINOGEN 0.2 0.2 0.2 0.2 0.2   NITRITE Negative Negative Negative Negative Negative   LEUKEST Moderate* Negative Small* Small* Small*   RBCU Unable to quantitate other elements due to packed WBC's. *  --  O - 2 2-5* 2-5*   WBCU >100*  --  10-25* 25-50* 25-50*     Recent Labs   Lab Test 02/04/22  0902 10/28/21  1118 06/14/21  1516   UTPG 0.29* 0.44* 0.38*     PTH  Recent Labs   Lab Test 06/14/21  1455   PTHI 107*     IRON STUDIES  Recent Labs   Lab Test 10/28/21  1118 10/20/21  1022 02/19/21  1528   IRON 60  --   --      --   --    IRONSAT 19  --   --    SEGUNDO 81 78 97       Tiffany Suero, NP

## 2022-02-10 NOTE — PROGRESS NOTES
[facility]     Date of visit: 2/14/2021      Assessment:   The patient is a 81-year-old male with past medical history significant for primary osteoarthritis of the both shoulders, chronic bilateral shoulder pain (right greater than the left), RIN, COPD, obesity with BMI 39, hypertension, paroxysmal A. fib status post pacemaker and DVT.       Visit Diagnoses:  1. Primary osteoarthritis of shoulders, bilateral        Plan:    Therapies:   1. Physical Therapy: continue regular physical activity as able. Restart physical therapy post operatively.   2. Clinical Health Psychologist to address issues of relaxation, behavioral change, coping style, and other factors important to improvement: not at this time  Medication Management:   1. Jaun reports excellent pain benefit with Tramadol and notes this medication allows him to be more active. He has severe pain with end stage osteoarthritis. For these reasons it is reasonable to continue Tramadol 50mg BID as needed for severe pain.  Further procedures recommended:   3. Jaun reports more substantial and longer lasting pain benefit with recent injections with [facility], still has some good pain relief. He would like to repeat this injection. Repeat bilateral glenohumeral joint injections ordered today. They will call to schedule. He understands he needs to have at least 3 months prior to surgery and will update surgeon about this.     Follow up: in 8-12 weeks- virtual visit is fine.       Krupa AGUSTIN CNP  [facility]     -------------------------------------------------    Subjective:    Chief complaint:   Chief Complaint   Patient presents with     RECHECK     UMP RETURN, RM 12, Patient reports pain in shoulders       Interval history:  Erich Craven is a 81 year old male last seen on 11/8/2021.  He is a patient of Dr. Allen seen in follow up.     Recommendations/plan at the last visit included:  Therapies:    4. Physical Therapy: continue regular physical activity as able. Restart physical therapy when ready.   5. Clinical Health Psychologist to address issues of relaxation, behavioral change, coping style, and other factors important to improvement: not at this time  Medication Management:   1. Jaun reports excellent pain benefit with Tramadol and notes this medication allows him to be more active. He was able to discontinue clonazepam without any difficulty. He reports no side effects from this medication. It is reasonable to continue Tramadol 50mg BID as needed for severe pain but I do not recommend dose increase.   Further procedures recommended:   6. One month of pain benefit with last two suprascapular nerve blocks- 2 weeks of almost 100% pain relief and then wearing off. Jaun is open to more invasive procedures if he can avoid surgery. I will discuss intervention options with Dr. Allen. If he recommended repeating suprascapular nerve blocks, or pulsed RFA or peripheral nerve stimulator, we will reach out to discuss.     Follow up: in 8-12 weeks.         Since his last visit, Roberto Carlos Towey reports:  -His pain is about the same as it was at last visit.   -He established care with [facility] on 7/6/2022. He is going to have a shoulder replacement with Dr. Jeffries. He states the shoulders are equally bothersome so will defer to their recommendation on which surgery to have first. He will plan to have the other shoulder done 6 weeks later.   -He had bilateral glenohumeral steroid injections with [facility] on 1/11/2022. He reports about 50-60% pain relief lasting for about a month, now starting to wear off. Previously he had bilateral suprascapular nerve blocks in September 2021 with excellent pain benefit but shorter term. He reports better pain relief with the most recent injections and he is interested in repeating. He also reports activity modification has helped.   -He was approved by  [facility] to have the surgery.   -He continues Tramadol 50mg BID with good pain benefit and increased physical activity.   -He had a leg procedure in November and was prescribed Percocet for post op pain management, he didn't realize he needed to report this.   -Physical therapy was paused because of limited benefit.         HPI per Dr. Allen:  The patient is a 81-year-old male with past medical history significant for primary osteoarthritis of the both shoulders, chronic bilateral shoulder pain (right greater than the left), RIN, COPD, obesity with BMI 39, hypertension, paroxysmal A. fib status post pacemaker and DVT.  His pain located in the anterior aspect bilateral shoulder and occasionally pain radiating to bilateral upper arms and to elbows.  Pain increases with reaching overhead, reaching behind the back, putting on jacket forgetting and lifting.  He is unable to lift more than 90 degrees.  He notes that pain gradually gets worse throughout the day.  He has been taking Tylenol 3 with good pain relief.  He states that he takes only at night.  He was scheduled for the shoulder replacement surgery however it was canceled due to his COPD exacerbation and upper respiratory infection.  He is still planning on right shoulder surgery.  Date is not confirmed yet.     He came to us for better pain control.  In the past he had performed multiple shoulder injections with good pain relief.  He is interested in trying them again.  He also requests alternative to Tylenol 3.     I have reviewed his x-ray of the shoulder.  It shows severe degenerative joint disease in both shoulder.  No visible erosion of the glenoid in the right shoulder is noted.       Pain Information:   Pain rating: averages 5/10 on a 0-10 scale.     Current pain medications:    Tylenol 1000mg QID- somewhat helpful, not as good as Tramadol    Tramadol 50mg BID- helpful   Zoloft 50mg daily   Hemp CBD gel- [facility]   Voltaren gel QID PRN-  [facility]    Current MME: 0    THE 4 A's OF OPIOID MAINTENANCE ANALGESIA    Analgesia: very good at low dose    Activity: improved    Adverse effects: denies    Adherence to Rx protocol: no concerns    [place] Board of Pharmacy Data Base Reviewed:    YES;     Review of [place] Prescription Monitoring Program (): No concern for abuse or misuse of controlled medications based on this report.     Annual Controlled Substance Agreement/UDS due date: September 2022    Past pain treatments:  bilateral glenohumeral steroid injections with [facility] on 1/11/2022- 50-60% pain relkief  repeat bilateral suprascapular nerve blocks with Dr. Allen on 9/23/2021- excellent pain benefit for 2 weeks but wearing off fairly quickly afterwards  bilateral suprascapular nerve blocks with Dr. Allen on 8/26/2021- very good pain benefit with the procedure but less than 2 weeks      Medications:  Current Outpatient Medications   Medication Sig Dispense Refill     acetaminophen (TYLENOL) 500 MG tablet Take 1,000 mg by mouth 4 times daily        aspirin (ASA) 81 MG chewable tablet Take 1 tablet by mouth daily       atorvastatin (LIPITOR) 40 MG tablet TAKE 1 TABLET BY MOUTH EVERY EVENING 90 tablet 2     carvedilol (COREG) 12.5 MG tablet TAKE 1 TABLET BY MOUTH TWICE A  tablet 2     co-enzyme Q-10 100 MG CAPS capsule Take 100 mg by mouth daily       diltiazem ER COATED BEADS (CARDIZEM CD/CARTIA XT) 300 MG 24 hr capsule TAKE 1 CAPSULE BY MOUTH EVERY DAY 90 capsule 3     Fluticasone-Umeclidin-Vilanterol (TRELEGY ELLIPTA) 200-62.5-25 MCG/INH oral inhaler Inhale 1 puff into the lungs daily       furosemide (LASIX) 20 MG tablet Take 2 tabs in morning and 1 tab in the sfternoon 360 tablet 3     levothyroxine (SYNTHROID/LEVOTHROID) 175 MCG tablet Take 1 tablet (175 mcg) by mouth daily Additional 1/2 tablet on Sundays 90 tablet 2     lisinopril (ZESTRIL) 20 MG tablet Take 1 tablet (20 mg) by mouth daily 90 tablet 3     metolazone (ZAROXOLYN) 2.5  MG tablet Take 1 tablet (2.5 mg) by mouth 2 times daily 180 tablet 3     Multiple vitamin TABS Take 1 tablet by mouth daily       order for DME Oxygen 2 Li/min  Continuous. At night bled into PAP device. In the daytime, NC with bubbler: requiring portability 1 Device 0     spironolactone (ALDACTONE) 25 MG tablet Take 1 tablet (25 mg) by mouth every other day 45 tablet 2     tamsulosin (FLOMAX) 0.4 MG capsule Take 1 capsule (0.4 mg) by mouth daily 90 capsule 1     traMADol (ULTRAM) 50 MG tablet Take 1 tablet (50 mg) by mouth every 6 hours as needed for severe pain Max of 2 tabs/day. 60 tablet 0     venlafaxine (EFFEXOR) 37.5 MG tablet TAKE 1 TABLET BY MOUTH TWICE DAILY. 180 tablet 0     warfarin ANTICOAGULANT (COUMADIN) 5 MG tablet Take 1.5 tablets (7.5 mg) on Fridays and take 1 tablet (5 mg) all other days or as directed by the INR clinic 100 tablet 1       Medical History: any changes in medical history since they were last seen? No      Objective:    Physical Exam:  Blood pressure 104/66, pulse 65, SpO2 96 %.  Constitutional: Well developed, well nourished, appears stated age. Obese.  HEENT: Head atraumatic, normocephalic. Eyes without conjunctival injection or jaundice. Oropharynx clear. Neck supple. No obvious neck masses.  Skin: No rash, lesions, or petechiae of exposed skin.   Psychiatric/mental status: Alert, without lethargy or stupor. Speech fluent. Appropriate affect. Mood normal. Able to follow commands without difficulty.     Imaging:  X-ray of bilateral shoulders was completed on 2/26/2021 and shows:  Impression: Bilateral shoulder x-rays demonstrate severe degenerative joint disease of both shoulders.  Noticeable erosion of the glenoid in the right shoulder is noted.  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  No significant proximal migration of the humeral head is noted bilaterally.  Otherwise, no acute fractures or other osseous pathology noted.      BILLING TIME DOCUMENTATION:   The total TIME  spent on this patient on the date of the encounter/appointment was 24 minutes.      TOTAL TIME includes:   Time spent preparing to see the patient (reviewing records and tests)   Time spent face to face (or over the phone) with the patient   Time spent ordering tests, medications, procedures and referrals   Time spent Referring and communicating with other healthcare professionals   Time spent documenting clinical information in Epic     Answers for HPI/ROS submitted by the patient on 2/7/2022  SALO 7 TOTAL SCORE: 0  General Symptoms: No  Skin Symptoms: No  HENT Symptoms: No  EYE SYMPTOMS: No  HEART SYMPTOMS: No  LUNG SYMPTOMS: No  INTESTINAL SYMPTOMS: No  URINARY SYMPTOMS: No  REPRODUCTIVE SYMPTOMS: No  SKELETAL SYMPTOMS: No  BLOOD SYMPTOMS: No  NERVOUS SYSTEM SYMPTOMS: No  MENTAL HEALTH SYMPTOMS: No

## 2022-02-10 NOTE — LETTER
2/10/2022     RE: Erich Craven  [address]  [place] [address]     Dear Colleague,    Thank you for referring your patient, Erich Craven, to the [facility] [place] at [facility]. Please see a copy of my visit note below.    Jaun is a 81 year old who is being evaluated via a billable video visit.      How would you like to obtain your AVS? MyChart  If the video visit is dropped, the invitation should be resent by: Send to e-mail at: [email or web address]  Will anyone else be joining your video visit? No      Video Start Time:1215  Video-Visit Details    Type of service:  Video Visit    Video End Time:1230    Originating Location (pt. Location): home    Distant Location (provider location):  [facility] [place]     Platform used for Video Visit: [employer]    Nephrology Video Visit 2/10/22    Assessment and Plan:    1. CKD3a/b w/mild proteinuria - Improved? Dilutional? Decreased following reduction in ACE? Creat 0.9, eGFR 76 ml/mn, UPCR 0.2 g/gCr  Baseline creat had been running mid to upper 1's since 8/20 w/recurrent DONA but ACE dose was cut in half 10/21 and creat has been declining since.    - Etiology of his CKD likely HTN/DONA   - Renal US normal   - Does not use NSAIDs   - Blood pressure at goal to soft   - DM well controlled with A1c of 5.6 % ( 2/22)   - He is on statin for CV risk reduction    2. CV/HTN/Pafib, Diastolic HF - Currently well controlled. Has very mild end of day edema. No dyspnea. Weight 277#, down from 292# in 6/21. Home b/ps now in the teens/.  Has not brought in his b/p device to be checked for accuracy. Echo 1/22 showing EF 67%, dilated IVC. He is on triple diuretic therapy. Follows with Dr Osuna, [facility]  Current regimen:    Coreg 12.5 mg bid   Lasix 40 mg AM, 20 mg PM   Spironolactone 25 mg every other day   Lisinopril 20 mg every day   Diltiazem  mg every day   Imdur  30 mg every day   Metolazone 2.5 mg bid    - Continue current regimen     3. Electrolytes - No acute concerns but does have a tendency toward mild hyponatremia ( Na 133).     - He is on Metolazone which can cause hyponatremia so will need to trend    4. Acid base - No acute concerns. Bicarb 31    5. BMD - Ca 9.3 Phos 3.2 albumin 3.4   Vit D 34,  ( 6/21)   Not currently on Vit D    6. BPH - Controlled with Flomax    7. Anemia - Hgb 12.8   - Iron studies 10/21: Ferritin 81 Fe 60 IS 19   - He does not require iron/LEIGH at this time    8. Disposition - RTC 4 months for follow up w/labs prior    Assessment and plan was discussed with patient and he voiced his understanding and agreement.    Reason for Visit:  CKD/HTN    HPI:  Mr Craven is an 80 yo male with CKDa/b, COPD, RIN on BiPAP, Diastolic HF, SSS s/p PPM, CAD, P-Afib, HTN, BPH, h/o Pulmonary embolism, present today for CKD follow up. Last seen in clinic by me on 11/2/21  Baseline creat mid to upper 1's    ROS:   No complaints  Reports increased edema recently due to a med change, but has resumed his prior regimen with improvement in edema    Using Bipap at HS for his RIN  Home blood pressures teens/ . Denies lightheadedness/syncope  Has completed his COVID vax series/booster and Flu vax  Denies CP/dyspnea  No abdominal concerns  Voiding w/o complication. On Flomax  Appetite is good. Has been a vegetarian for years  Walking with his dog regularly    Chronic Health Problems:    CKD3b  COPD  RIN on CPAP  Diastolic HF  SSS s/p PPM  CAD  P-Afib  h/o Pulmonary embolism  HTN  Hypothyroidism  Anxiety  BPH  HLD    Family Hx:   Family History   Problem Relation Age of Onset     Hypertension Father      Personal Hx:   , lives with his puppy Clayton. NS    Allergies:  Allergies   Allergen Reactions     No Known Allergies        Medications:  Current Outpatient Medications   Medication Sig     acetaminophen (TYLENOL) 500 MG tablet Take 1,000 mg by mouth 4 times daily       aspirin (ASA) 81 MG chewable tablet Take 1 tablet by mouth daily     atorvastatin (LIPITOR) 40 MG tablet TAKE 1 TABLET BY MOUTH EVERY EVENING     carvedilol (COREG) 12.5 MG tablet TAKE 1 TABLET BY MOUTH TWICE A DAY     co-enzyme Q-10 100 MG CAPS capsule Take 100 mg by mouth daily     diltiazem ER COATED BEADS (CARDIZEM CD/CARTIA XT) 300 MG 24 hr capsule TAKE 1 CAPSULE BY MOUTH EVERY DAY     Fluticasone-Umeclidin-Vilanterol (TRELEGY ELLIPTA) 200-62.5-25 MCG/INH oral inhaler Inhale 1 puff into the lungs daily     furosemide (LASIX) 20 MG tablet Take 2 tabs in morning and 1 tab in the sfternoon     levothyroxine (SYNTHROID/LEVOTHROID) 175 MCG tablet Take 1 tablet (175 mcg) by mouth daily Additional 1/2 tablet on Sundays     lisinopril (ZESTRIL) 20 MG tablet Take 1 tablet (20 mg) by mouth daily     metolazone (ZAROXOLYN) 2.5 MG tablet Take 1 tablet (2.5 mg) by mouth 2 times daily     Multiple vitamin TABS Take 1 tablet by mouth daily     order for DME Oxygen 2 Li/min  Continuous. At night bled into PAP device. In the daytime, NC with bubbler: requiring portability     tamsulosin (FLOMAX) 0.4 MG capsule Take 1 capsule (0.4 mg) by mouth daily     venlafaxine (EFFEXOR) 37.5 MG tablet TAKE 1 TABLET BY MOUTH TWICE DAILY.     warfarin ANTICOAGULANT (COUMADIN) 5 MG tablet Take 1.5 tablets (7.5 mg) on Fridays and take 1 tablet (5 mg) all other days or as directed by the INR clinic     albuterol (PROAIR HFA/PROVENTIL HFA/VENTOLIN HFA) 108 (90 Base) MCG/ACT inhaler Inhale 2 puffs into the lungs every 4 hours as needed for shortness of breath / dyspnea or wheezing (Patient not taking: Reported on 12/23/2021)     isosorbide mononitrate (IMDUR) 30 MG 24 hr tablet TAKE 1 TABLET BY MOUTH EVERY DAY     lidocaine (LMX4) 4 % external cream Apply topically as needed for mild pain     spironolactone (ALDACTONE) 25 MG tablet Take 1 tablet (25 mg) by mouth every other day     [START ON 2/14/2022] traMADol (ULTRAM) 50 MG tablet Take 1  tablet (50 mg) by mouth every 6 hours as needed for severe pain Max of 2 tabs/day.     No current facility-administered medications for this visit.      Vitals:  B/P 134/66, Wt 275#    Exam:  GEN: Pleasant male in NAD  RESP: Breathing is non labored. Speaking in full sentences  NEURO: A/O  PSYCH: Bright affect, engaging     LABS:   CMP  Recent Labs   Lab Test 02/04/22  0902 10/28/21  1118 10/20/21  1022 09/23/21  1349 08/31/21  1401 06/21/21  1454 06/14/21  1455 04/21/21  0931 03/02/21  1405    132* 135  --  137 138 137 138 136   POTASSIUM 3.5 5.2 5.2  --  4.2 4.1 4.7 3.7 4.5   CHLORIDE 96 102 101  --  103 103 105 99 104   CO2 31 25 23  --  28 29 28 39* 28   ANIONGAP 6 5 11  --  6 6 4 <1* 4   * 95 92 89 127* 100* 108* 104* 93   BUN 29 47* 68*  --  45* 64* 62* 44* 57*   CR 0.99 1.39* 1.85*  --  1.07 1.57* 1.81* 1.02 1.42*   GFRESTIMATED 76 47* 33*  --  64 40* 34* 68 46*   GFRESTBLACK  --   --   --   --   --  47* 39* 79 53*   LAWSON 9.3 9.0 9.3  --  9.0 9.4 9.2 9.2 9.6     Recent Labs   Lab Test 10/20/21  1022 02/19/21  1528 08/11/20  1343 07/14/20  1532 01/22/20  1216   BILITOTAL 0.4 0.4 0.5  --  0.5   ALKPHOS 108 107 104  --  112   ALT 20 26 49 45 45   AST 11 14 31  --  25     CBC  Recent Labs   Lab Test 02/04/22  0902 10/28/21  1118 10/20/21  1022 06/14/21  1455 03/24/21  1805 03/04/21  1932   HGB 12.8* 12.6* 12.5* 12.2* 11.9* 11.4*   WBC  --   --  10.0 10.2 10.4 10.4   RBC  --   --  3.78* 3.68* 3.65* 3.49*   HCT  --   --  38.1* 36.7* 37.3* 36.1*   MCV  --   --  101* 100 102* 103*   MCH  --   --  33.1* 33.2* 32.6 32.7   MCHC  --   --  32.8 33.2 31.9 31.6   RDW  --   --  17.0* 15.6* 15.8* 15.7*   PLT  --   --  224 195 208 185     URINE STUDIES  Recent Labs   Lab Test 06/14/21  1516 04/21/21  0853 12/21/20  1219 12/14/20  1706 08/11/20  1344   COLOR Yellow Yellow Yellow Yellow Yellow   APPEARANCE Cloudy Clear Clear Clear Clear   URINEGLC Negative Negative Negative Negative Negative   URINEBILI Negative  Negative Negative Negative Negative   URINEKETONE Negative Negative Negative Negative Negative   SG 1.020 1.015 1.015 1.015 1.020   UBLD Small* Negative Negative Trace* Negative   URINEPH 5.0 7.0 5.0 6.0 6.5   PROTEIN 30* Negative Negative 30* Negative   UROBILINOGEN 0.2 0.2 0.2 0.2 0.2   NITRITE Negative Negative Negative Negative Negative   LEUKEST Moderate* Negative Small* Small* Small*   RBCU Unable to quantitate other elements due to packed WBC's. *  --  O - 2 2-5* 2-5*   WBCU >100*  --  10-25* 25-50* 25-50*     Recent Labs   Lab Test 02/04/22  0902 10/28/21  1118 06/14/21  1516   UTPG 0.29* 0.44* 0.38*     PTH  Recent Labs   Lab Test 06/14/21  1455   PTHI 107*     IRON STUDIES  Recent Labs   Lab Test 10/28/21  1118 10/20/21  1022 02/19/21  1528   IRON 60  --   --      --   --    IRONSAT 19  --   --    SEGUNDO 81 78 97       Tiffany Suero, NP

## 2022-02-10 NOTE — PROGRESS NOTES
Jaun is a 81 year old who is being evaluated via a billable video visit.      How would you like to obtain your AVS? MyChart  If the video visit is dropped, the invitation should be resent by: Send to e-mail at: [email or web address]  Will anyone else be joining your video visit? No      Video Start Time:1215  Video-Visit Details    Type of service:  Video Visit    Video End Time:1230    Originating Location (pt. Location): home    Distant Location (provider location):  [facility] [place]     Platform used for Video Visit: [employer]

## 2022-02-10 NOTE — TELEPHONE ENCOUNTER
Prescription approved per [facility] Refill Protocol.  LOV: 10/20/21 advised Return in about 3 months (around 1/20/2022),   NOV: none, writer sent patient [employer] message to schedule         Georgiana Jennings RN  [facility]

## 2022-02-10 NOTE — CONFIDENTIAL NOTE
Refill request    Medication: traMADol (ULTRAM) 50 MG tablet      Last clinic appointment: 11/5/21  Next clinic appointment: 2/14/22    Last Drug Screen Collected: 9/20/21  Controlled Substance Agreement signed: 9/20/21      Preferred pharmacy:    [id number] - [place], [place] - [address] AT [address] [place] [address]      Linda Serrano RN

## 2022-02-10 NOTE — TELEPHONE ENCOUNTER
Signed Prescriptions:                        Disp   Refills    traMADol (ULTRAM) 50 MG tablet             60 tab*0        Sig: Take 1 tablet (50 mg) by mouth every 6 hours as           needed for severe pain Max of 2 tabs/day.  Authorizing Provider: LAURA SANTIZO    Reviewed [place] Prescription Monitoring Program, appears appropriate.       VAMSI Zuluaga [facility]

## 2022-02-14 ENCOUNTER — OFFICE VISIT (OUTPATIENT)
Dept: ANESTHESIOLOGY | Facility: CLINIC | Age: 82
End: 2022-02-14
Payer: MEDICARE

## 2022-02-14 VITALS — OXYGEN SATURATION: 96 % | HEART RATE: 65 BPM | SYSTOLIC BLOOD PRESSURE: 104 MMHG | DIASTOLIC BLOOD PRESSURE: 66 MMHG

## 2022-02-14 DIAGNOSIS — M25.511 CHRONIC PAIN OF BOTH SHOULDERS: ICD-10-CM

## 2022-02-14 DIAGNOSIS — M19.012 PRIMARY OSTEOARTHRITIS OF SHOULDERS, BILATERAL: Primary | ICD-10-CM

## 2022-02-14 DIAGNOSIS — M25.512 CHRONIC PAIN OF BOTH SHOULDERS: ICD-10-CM

## 2022-02-14 DIAGNOSIS — G89.29 CHRONIC PAIN OF BOTH SHOULDERS: ICD-10-CM

## 2022-02-14 DIAGNOSIS — M19.011 PRIMARY OSTEOARTHRITIS OF SHOULDERS, BILATERAL: Primary | ICD-10-CM

## 2022-02-14 PROCEDURE — 99213 OFFICE O/P EST LOW 20 MIN: CPT | Performed by: NURSE PRACTITIONER

## 2022-02-14 ASSESSMENT — PAIN SCALES - GENERAL: PAINLEVEL: SEVERE PAIN (6)

## 2022-02-14 NOTE — LETTER
2/14/2022       RE: Erich Craven  [address]  [facility] [address]     Dear Colleague,    Thank you for referring your patient, Erich Craven, to the [facility] FOR [facility]IVE PAIN MANAGEMENT [place] at [facility]. Please see a copy of my visit note below.      [facility]     Date of visit: 2/14/2021      Assessment:   The patient is a 81-year-old male with past medical history significant for primary osteoarthritis of the both shoulders, chronic bilateral shoulder pain (right greater than the left), RIN, COPD, obesity with BMI 39, hypertension, paroxysmal A. fib status post pacemaker and DVT.       Visit Diagnoses:  1. Primary osteoarthritis of shoulders, bilateral        Plan:    Therapies:   1. Physical Therapy: continue regular physical activity as able. Restart physical therapy post operatively.   2. Clinical Health Psychologist to address issues of relaxation, behavioral change, coping style, and other factors important to improvement: not at this time  Medication Management:   1. Jaun reports excellent pain benefit with Tramadol and notes this medication allows him to be more active. He has severe pain with end stage osteoarthritis. For these reasons it is reasonable to continue Tramadol 50mg BID as needed for severe pain.  Further procedures recommended:   3. Jaun reports more substantial and longer lasting pain benefit with recent injections with [facility], still has some good pain relief. He would like to repeat this injection. Repeat bilateral glenohumeral joint injections ordered today. They will call to schedule. He understands he needs to have at least 3 months prior to surgery and will update surgeon about this.     Follow up: in 8-12 weeks- virtual visit is fine.       Krupa AGUSTIN CNP  [facility]      -------------------------------------------------    Subjective:    Chief complaint:   Chief Complaint   Patient presents with     RECHECK     [facility] RETURN, RM 12, Patient reports pain in shoulders       Interval history:  Erich Craven is a 81 year old male last seen on 11/8/2021.  He is a patient of Dr. Allen seen in follow up.     Recommendations/plan at the last visit included:  Therapies:   4. Physical Therapy: continue regular physical activity as able. Restart physical therapy when ready.   5. Clinical Health Psychologist to address issues of relaxation, behavioral change, coping style, and other factors important to improvement: not at this time  Medication Management:   1. Jaun reports excellent pain benefit with Tramadol and notes this medication allows him to be more active. He was able to discontinue clonazepam without any difficulty. He reports no side effects from this medication. It is reasonable to continue Tramadol 50mg BID as needed for severe pain but I do not recommend dose increase.   Further procedures recommended:   6. One month of pain benefit with last two suprascapular nerve blocks- 2 weeks of almost 100% pain relief and then wearing off. Jaun is open to more invasive procedures if he can avoid surgery. I will discuss intervention options with Dr. Allen. If he recommended repeating suprascapular nerve blocks, or pulsed RFA or peripheral nerve stimulator, we will reach out to discuss.     Follow up: in 8-12 weeks.         Since his last visit, Erich Craven reports:  -His pain is about the same as it was at last visit.   -He established care with [facility] on 7/6/2022. He is going to have a shoulder replacement with Dr. Jeffries. He states the shoulders are equally bothersome so will defer to their recommendation on which surgery to have first. He will plan to have the other shoulder done 6 weeks later.   -He had bilateral glenohumeral steroid injections with [facility] on 1/11/2022. He reports about 50-60% pain relief lasting for about a month, now starting to wear off. Previously he had bilateral suprascapular nerve blocks in September 2021 with excellent pain benefit but shorter term. He reports better pain relief with the most recent injections and he is interested in repeating. He also reports activity modification has helped.   -He was approved by [facility] to have the surgery.   -He continues Tramadol 50mg BID with good pain benefit and increased physical activity.   -He had a leg procedure in November and was prescribed Percocet for post op pain management, he didn't realize he needed to report this.   -Physical therapy was paused because of limited benefit.         HPI per Dr. Allen:  The patient is a 81-year-old male with past medical history significant for primary osteoarthritis of the both shoulders, chronic bilateral shoulder pain (right greater than the left), RIN, COPD, obesity with BMI 39, hypertension, paroxysmal A. fib status post pacemaker and DVT.  His pain located in the anterior aspect bilateral shoulder and occasionally pain radiating to bilateral upper arms and to elbows.  Pain increases with reaching overhead, reaching behind the back, putting on jacket forgetting and lifting.  He is unable to lift more than 90 degrees.  He notes that pain gradually gets worse throughout the day.  He has been taking Tylenol 3 with good pain relief.  He states that he takes only at night.  He was scheduled for the shoulder replacement surgery however it was canceled due to his COPD exacerbation and upper respiratory infection.  He is still planning on right shoulder surgery.  Date is not confirmed yet.     He came to us for better pain control.  In the past he had performed multiple shoulder injections with good pain relief.  He is interested in trying them again.  He also requests alternative to Tylenol 3.     I have reviewed his x-ray of the shoulder.   It shows severe degenerative joint disease in both shoulder.  No visible erosion of the glenoid in the right shoulder is noted.       Pain Information:   Pain rating: averages 5/10 on a 0-10 scale.     Current pain medications:    Tylenol 1000mg QID- somewhat helpful, not as good as Tramadol    Tramadol 50mg BID- helpful   Zoloft 50mg daily   Hemp CBD gel- [facility]   Voltaren gel QID PRN- [facility]    Current MME: 0    THE 4 A's OF OPIOID MAINTENANCE ANALGESIA    Analgesia: very good at low dose    Activity: improved    Adverse effects: denies    Adherence to Rx protocol: no concerns    [place] Board of Pharmacy Data Base Reviewed:    YES;     Review of [place] Prescription Monitoring Program (): No concern for abuse or misuse of controlled medications based on this report.     Annual Controlled Substance Agreement/UDS due date: September 2022    Past pain treatments:  bilateral glenohumeral steroid injections with [facility] on 1/11/2022- 50-60% pain relkief  repeat bilateral suprascapular nerve blocks with Dr. Allen on 9/23/2021- excellent pain benefit for 2 weeks but wearing off fairly quickly afterwards  bilateral suprascapular nerve blocks with Dr. Allen on 8/26/2021- very good pain benefit with the procedure but less than 2 weeks    Medications:  Current Outpatient Medications   Medication Sig Dispense Refill     acetaminophen (TYLENOL) 500 MG tablet Take 1,000 mg by mouth 4 times daily        aspirin (ASA) 81 MG chewable tablet Take 1 tablet by mouth daily       atorvastatin (LIPITOR) 40 MG tablet TAKE 1 TABLET BY MOUTH EVERY EVENING 90 tablet 2     carvedilol (COREG) 12.5 MG tablet TAKE 1 TABLET BY MOUTH TWICE A  tablet 2     co-enzyme Q-10 100 MG CAPS capsule Take 100 mg by mouth daily       diltiazem ER COATED BEADS (CARDIZEM CD/CARTIA XT) 300 MG 24 hr capsule TAKE 1 CAPSULE BY MOUTH EVERY DAY 90 capsule 3     Fluticasone-Umeclidin-Vilanterol (TRELEGY ELLIPTA) 200-62.5-25 MCG/INH oral inhaler Inhale 1  puff into the lungs daily       furosemide (LASIX) 20 MG tablet Take 2 tabs in morning and 1 tab in the sfternoon 360 tablet 3     levothyroxine (SYNTHROID/LEVOTHROID) 175 MCG tablet Take 1 tablet (175 mcg) by mouth daily Additional 1/2 tablet on Sundays 90 tablet 2     lisinopril (ZESTRIL) 20 MG tablet Take 1 tablet (20 mg) by mouth daily 90 tablet 3     metolazone (ZAROXOLYN) 2.5 MG tablet Take 1 tablet (2.5 mg) by mouth 2 times daily 180 tablet 3     Multiple vitamin TABS Take 1 tablet by mouth daily       order for DME Oxygen 2 Li/min  Continuous. At night bled into PAP device. In the daytime, NC with bubbler: requiring portability 1 Device 0     spironolactone (ALDACTONE) 25 MG tablet Take 1 tablet (25 mg) by mouth every other day 45 tablet 2     tamsulosin (FLOMAX) 0.4 MG capsule Take 1 capsule (0.4 mg) by mouth daily 90 capsule 1     traMADol (ULTRAM) 50 MG tablet Take 1 tablet (50 mg) by mouth every 6 hours as needed for severe pain Max of 2 tabs/day. 60 tablet 0     venlafaxine (EFFEXOR) 37.5 MG tablet TAKE 1 TABLET BY MOUTH TWICE DAILY. 180 tablet 0     warfarin ANTICOAGULANT (COUMADIN) 5 MG tablet Take 1.5 tablets (7.5 mg) on Fridays and take 1 tablet (5 mg) all other days or as directed by the INR clinic 100 tablet 1       Medical History: any changes in medical history since they were last seen? No    Objective:    Physical Exam:  Blood pressure 104/66, pulse 65, SpO2 96 %.  Constitutional: Well developed, well nourished, appears stated age. Obese.  HEENT: Head atraumatic, normocephalic. Eyes without conjunctival injection or jaundice. Oropharynx clear. Neck supple. No obvious neck masses.  Skin: No rash, lesions, or petechiae of exposed skin.   Psychiatric/mental status: Alert, without lethargy or stupor. Speech fluent. Appropriate affect. Mood normal. Able to follow commands without difficulty.     Imaging:  X-ray of bilateral shoulders was completed on 2/26/2021 and shows:  Impression: Bilateral  shoulder x-rays demonstrate severe degenerative joint disease of both shoulders.  Noticeable erosion of the glenoid in the right shoulder is noted.  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  No significant proximal migration of the humeral head is noted bilaterally.  Otherwise, no acute fractures or other osseous pathology noted.      BILLING TIME DOCUMENTATION:   The total TIME spent on this patient on the date of the encounter/appointment was 24 minutes.      TOTAL TIME includes:   Time spent preparing to see the patient (reviewing records and tests)   Time spent face to face (or over the phone) with the patient   Time spent ordering tests, medications, procedures and referrals   Time spent Referring and communicating with other healthcare professionals   Time spent documenting clinical information in Epic     Answers for HPI/ROS submitted by the patient on 2/7/2022  SALO 7 TOTAL SCORE: 0  General Symptoms: No  Skin Symptoms: No  HENT Symptoms: No  EYE SYMPTOMS: No  HEART SYMPTOMS: No  LUNG SYMPTOMS: No  INTESTINAL SYMPTOMS: No  URINARY SYMPTOMS: No  REPRODUCTIVE SYMPTOMS: No  SKELETAL SYMPTOMS: No  BLOOD SYMPTOMS: No  NERVOUS SYSTEM SYMPTOMS: No  MENTAL HEALTH SYMPTOMS: No

## 2022-02-14 NOTE — NURSING NOTE
Chief Complaint   Patient presents with     RECHECK     [facility] RETURN, RM 12, Patient reports pain in shoulders     Pain medications: tylenol, tramadol    Refills: N/A

## 2022-02-14 NOTE — PATIENT INSTRUCTIONS
Therapies:   1. Physical Therapy: continue regular physical activity as able. Restart physical therapy post operatively.   2. Clinical Health Psychologist to address issues of relaxation, behavioral change, coping style, and other factors important to improvement: not at this time  Medication Management:   1. Continue Tramadol 50mg twice daily as needed for severe pain.  Further procedures recommended:   3. Repeat bilateral glenohumeral joint injections ordered today. They will call to schedule. Remember to have at least 3 months prior to surgery, update your surgeon about this.     Follow up: in 8-12 weeks.

## 2022-02-15 PROBLEM — M19.011 PRIMARY OSTEOARTHRITIS OF RIGHT SHOULDER: Status: ACTIVE | Noted: 2021-03-05

## 2022-02-17 ENCOUNTER — TRANSFERRED RECORDS (OUTPATIENT)
Dept: HEALTH INFORMATION MANAGEMENT | Facility: CLINIC | Age: 82
End: 2022-02-17
Payer: MEDICARE

## 2022-03-03 ENCOUNTER — LAB (OUTPATIENT)
Dept: LAB | Facility: CLINIC | Age: 82
End: 2022-03-03
Payer: MEDICARE

## 2022-03-03 ENCOUNTER — ANTICOAGULATION THERAPY VISIT (OUTPATIENT)
Dept: ANTICOAGULATION | Facility: CLINIC | Age: 82
End: 2022-03-03

## 2022-03-03 DIAGNOSIS — Z79.01 LONG TERM CURRENT USE OF ANTICOAGULANTS WITH INR GOAL OF 2.0-3.0: ICD-10-CM

## 2022-03-03 DIAGNOSIS — Z11.59 ENCOUNTER FOR SCREENING FOR OTHER VIRAL DISEASES: Primary | ICD-10-CM

## 2022-03-03 DIAGNOSIS — I26.99 PE (PULMONARY THROMBOEMBOLISM) (H): Primary | ICD-10-CM

## 2022-03-03 DIAGNOSIS — I82.4Z9 DEEP VEIN THROMBOSIS (DVT) OF DISTAL VEIN OF LOWER EXTREMITY, UNSPECIFIED CHRONICITY, UNSPECIFIED LATERALITY (H): ICD-10-CM

## 2022-03-03 DIAGNOSIS — I48.0 PAROXYSMAL A-FIB (H): ICD-10-CM

## 2022-03-03 DIAGNOSIS — I26.99 PE (PULMONARY THROMBOEMBOLISM) (H): ICD-10-CM

## 2022-03-03 LAB — INR BLD: 2 (ref 0.9–1.1)

## 2022-03-03 PROCEDURE — 85610 PROTHROMBIN TIME: CPT

## 2022-03-03 PROCEDURE — 36416 COLLJ CAPILLARY BLOOD SPEC: CPT

## 2022-03-03 NOTE — PROGRESS NOTES
ANTICOAGULATION MANAGEMENT     Erich Craven 81 year old male is on warfarin with therapeutic INR result. (Goal INR 2.0-3.0)    Recent labs: (last 7 days)     03/03/22  0841   INR 2.0*       ASSESSMENT       Source(s): Chart Review and Patient/Caregiver Call       Warfarin doses taken: Warfarin taken as instructed    Diet: No new diet changes identified    New illness, injury, or hospitalization: No    Medication/supplement changes: None noted    Signs or symptoms of bleeding or clotting: No    Previous INR: Therapeutic last visit; previously outside of goal range    Additional findings: Upcoming surgery/procedure  Has a shoulder injection coming up, not yet scheduled, but has had 3 of these in the past and does not need to hold warfarin.       PLAN     Recommended plan for no diet, medication or health factor changes affecting INR     Dosing Instructions: Continue your current warfarin dose with next INR in 5 weeks       Summary  As of 3/3/2022    Full warfarin instructions:  5 mg every day   Next INR check:  4/7/2022             Telephone call with Erich who verbalizes understanding and agrees to plan    Lab visit scheduled    Education provided: Please call back if any changes to your diet, medications or how you've been taking warfarin and Contact [phone]  with any changes, questions or concerns.     Plan made per [facility] anticoagulation protocol    Paola Bee, RN  Anticoagulation Clinic  3/3/2022    _______________________________________________________________________     Anticoagulation Episode Summary     Current INR goal:  2.0-3.0   TTR:  75.3 % (1 y)   Target end date:  Indefinite   Send INR reminders to:  [facility] [place]    Indications    PE (pulmonary thromboembolism) (H) [I26.99]  Paroxysmal A-fib (H) [I48.0]  Deep vein thrombosis (DVT) of distal vein of lower extremity  unspecified chronicity  unspecified laterality (H) [I82.4Z9]  Long term current use of anticoagulants with INR goal of  2.0-3.0 [Z79.01]           Comments:           Anticoagulation Care Providers     Provider Role Specialty Phone number    Dontrell Gómez MD Referring Internal Medicine [phone]    Anne Heath MD Referring Internal Medicine [phone]

## 2022-03-07 ENCOUNTER — LAB (OUTPATIENT)
Dept: URGENT CARE | Facility: URGENT CARE | Age: 82
End: 2022-03-07
Payer: MEDICARE

## 2022-03-07 DIAGNOSIS — G89.29 CHRONIC PAIN OF BOTH SHOULDERS: ICD-10-CM

## 2022-03-07 DIAGNOSIS — Z11.59 ENCOUNTER FOR SCREENING FOR OTHER VIRAL DISEASES: ICD-10-CM

## 2022-03-07 DIAGNOSIS — M25.512 CHRONIC PAIN OF BOTH SHOULDERS: ICD-10-CM

## 2022-03-07 DIAGNOSIS — M25.511 CHRONIC PAIN OF BOTH SHOULDERS: ICD-10-CM

## 2022-03-07 LAB — SARS-COV-2 RNA RESP QL NAA+PROBE: NEGATIVE

## 2022-03-07 PROCEDURE — U0003 INFECTIOUS AGENT DETECTION BY NUCLEIC ACID (DNA OR RNA); SEVERE ACUTE RESPIRATORY SYNDROME CORONAVIRUS 2 (SARS-COV-2) (CORONAVIRUS DISEASE [COVID-19]), AMPLIFIED PROBE TECHNIQUE, MAKING USE OF HIGH THROUGHPUT TECHNOLOGIES AS DESCRIBED BY CMS-2020-01-R: HCPCS

## 2022-03-07 PROCEDURE — U0005 INFEC AGEN DETEC AMPLI PROBE: HCPCS

## 2022-03-07 NOTE — TELEPHONE ENCOUNTER
[facility] Call Center    Phone Message    May a detailed message be left on voicemail: yes     Reason for Call: Medication Refill Request    Has the patient contacted the pharmacy for the refill? Yes   Name of medication being requested: traMADol (ULTRAM) 50 MG tablet  Provider who prescribed the medication: Dr. Allen  Pharmacy: [id number] - [place], [place] - [address] AT [address] [place] [address] & [facility]  Date medication is needed: asap    Patient reporting he reached out to pharmacy about Rx needing to be filled. Patient switched pharmacies at the beginning of 2022. Having procedure done this week. Kate will be contacting clinic about refill.      Action Taken: Message routed to:  [facility] Pain    Travel Screening: Not Applicable

## 2022-03-08 RX ORDER — TRAMADOL HYDROCHLORIDE 50 MG/1
50 TABLET ORAL EVERY 6 HOURS PRN
Qty: 60 TABLET | Refills: 0 | Status: SHIPPED | OUTPATIENT
Start: 2022-03-13 | End: 2022-04-05

## 2022-03-08 NOTE — CONFIDENTIAL NOTE
Refill request    Medication: traMADol (ULTRAM) 50 MG tablet      Last clinic appointment: 2/14/22  Next clinic appointment: Not scheduled    Last Drug Screen Collected: 9/20/21  Controlled Substance Agreement signed: 9/20/21      Preferred pharmacy:    [id number] - [place], [place] - [address]      Linda Serrano RN

## 2022-03-08 NOTE — TELEPHONE ENCOUNTER
Signed Prescriptions:                        Disp   Refills    traMADol (ULTRAM) 50 MG tablet             60 tab*0        Sig: Take 1 tablet (50 mg) by mouth every 6 hours as           needed for severe pain Max of 2 tabs/day.  Authorizing Provider: LAURA SANTIZO      Reviewed [place] Prescription Monitoring Program, appears appropriate.     VAMSI Zuluaga [facility] Pain Management

## 2022-03-09 ENCOUNTER — TRANSFERRED RECORDS (OUTPATIENT)
Dept: HEALTH INFORMATION MANAGEMENT | Facility: CLINIC | Age: 82
End: 2022-03-09
Payer: MEDICARE

## 2022-03-10 ENCOUNTER — HOSPITAL ENCOUNTER (OUTPATIENT)
Facility: AMBULATORY SURGERY CENTER | Age: 82
Discharge: HOME | End: 2022-03-10
Attending: ANESTHESIOLOGY | Admitting: ANESTHESIOLOGY
Payer: MEDICARE

## 2022-03-10 ENCOUNTER — ANCILLARY PROCEDURE (OUTPATIENT)
Dept: RADIOLOGY | Facility: AMBULATORY SURGERY CENTER | Age: 82
End: 2022-03-10
Attending: ANESTHESIOLOGY
Payer: MEDICARE

## 2022-03-10 VITALS
HEART RATE: 67 BPM | WEIGHT: 265 LBS | BODY MASS INDEX: 35.89 KG/M2 | DIASTOLIC BLOOD PRESSURE: 69 MMHG | RESPIRATION RATE: 18 BRPM | SYSTOLIC BLOOD PRESSURE: 145 MMHG | TEMPERATURE: 97.2 F | HEIGHT: 72 IN | OXYGEN SATURATION: 94 %

## 2022-03-10 DIAGNOSIS — R52 PAIN: ICD-10-CM

## 2022-03-10 PROCEDURE — 64418 NJX AA&/STRD SPRSCAP NRV: CPT | Mod: 50 | Performed by: ANESTHESIOLOGY

## 2022-03-10 PROCEDURE — 76942 ECHO GUIDE FOR BIOPSY: CPT | Mod: 26 | Performed by: ANESTHESIOLOGY

## 2022-03-10 PROCEDURE — 64418 NJX AA&/STRD SPRSCAP NRV: CPT | Mod: RT

## 2022-03-10 RX ORDER — LIDOCAINE HYDROCHLORIDE 10 MG/ML
INJECTION, SOLUTION EPIDURAL; INFILTRATION; INTRACAUDAL; PERINEURAL PRN
Status: DISCONTINUED | OUTPATIENT
Start: 2022-03-10 | End: 2022-03-10 | Stop reason: HOSPADM

## 2022-03-10 RX ORDER — TRIAMCINOLONE ACETONIDE 40 MG/ML
INJECTION, SUSPENSION INTRA-ARTICULAR; INTRAMUSCULAR PRN
Status: DISCONTINUED | OUTPATIENT
Start: 2022-03-10 | End: 2022-03-10 | Stop reason: HOSPADM

## 2022-03-10 RX ORDER — BUPIVACAINE HYDROCHLORIDE 2.5 MG/ML
INJECTION, SOLUTION EPIDURAL; INFILTRATION; INTRACAUDAL PRN
Status: DISCONTINUED | OUTPATIENT
Start: 2022-03-10 | End: 2022-03-10 | Stop reason: HOSPADM

## 2022-03-10 NOTE — DISCHARGE INSTRUCTIONS
PAIN INJECTION HOME CARE INSTRUCTIONS  Activity  -You may resume most normal activity levels with the exception of strenuous activity. It may help to move in ways that hurt before the injection, to see if the pain is still there, but do not overdo it. Take it easy for the rest of the day.    -DO NOT remove bandaid for 24 hours  -DO NOT shower for 24 hours      Pain  -You may feel immediate pain relief and numbness for a period of time after the injection. This may indicate the medication has reached the right spot.  -Your pain may return after this short pain-free period, or may even be a little worse for a day or two. It may be caused by needle irritation or by the medication itself. The medications usually take two or three days to start working, but can take as long as a week.    -You may use an ice pack for 20 minutes every 2 hours for the first 24 hours  -You may use a heating pad after the first 24 hours  -You may use Tylenol (acetaminophen) every 4 hours or other pain medicines as directed by your physician          DID YOU RECEIVE STEROIDS TODAY?  YES    Common side effects of steroids:  Not everyone will experience corticosteroid side effects. If side effects are experienced, they will gradually subside in the 7-10 day period following an injection. Most common side effects include:  -Flushed face and/or chest  -Feeling of warmth, particularly in the face but could be an overall feeling of warmth  -Increased blood sugar in diabetic patients  -Menstrual irregularities my occur. If taking hormone-based birth control an alternate method of birth control is recommended  -Sleep disturbances and/or mood swings are possible  -Leg cramps    PLEASE KEEP TRACK OF YOUR SYMPTOMS AND NOTE ANY CHANGES FOR YOUR DOCTOR.       Please contact us if you have:  -Severe pain  -Fever more than 101.5 degrees Fahrenheit  -Signs of infection at the injection site (redness, swelling, or drainage)      FOR PAIN CENTER PATIENTS:  If  you have questions, please contact the Pain Clinic at [phone] Option #1 between the hours of 7:00 am and 3:00 pm Monday through Friday. After office hours you can contact the on call provider by dialing [phone]. If you need immediate attention, we recommend that you go to a hospital emergency room or dial [phone].

## 2022-03-21 ENCOUNTER — TRANSFERRED RECORDS (OUTPATIENT)
Dept: HEALTH INFORMATION MANAGEMENT | Facility: CLINIC | Age: 82
End: 2022-03-21
Payer: MEDICARE

## 2022-03-22 NOTE — LETTER
9/16/2021       RE: Erich Craven  [address]  [facility] [id number]     Dear Colleague,    Thank you for referring your patient, Erich Craven, to the [facility] [place] [facility]. Please see a copy of my visit note below.    Jaun is a 81 year old who is being evaluated via a billable video visit.      How would you like to obtain your AVS? MyChart  If the video visit is dropped, the invitation should be resent by: Other e-mail: MY CHART  Will anyone else be joining your video visit? No  Estee Helm CMA      Video Start Time: 11:31 AM    [facility]  Main Office: [address]  [address]  [place] [address]       CHIEF COMPLAINT:  Urinary frequency    HISTORY:   82 yo M with LUTS including urinary frequency. Patient attributes frequency to diuretics. Has been on long term tamsulosin 0.4 mg every day.     Nocturia 2x. Denies slow or weak stream. He feels like he empties completely. Does not need to strain or push. Does have some urgency with the frequency    In Jun 2021 was having dysuria, fatigue. Noted to have significant pyuria on UA. Urine culture eventually was no growth. Empiric abx resolved the symptoms. Prior UA in April 2021 was unremarkable. Last UTI was about 5 years ago.      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-49.9 (morbid obesity) (H)      RIN (obstructive sleep apnea)      has refused CPAP     Pacemaker      Paroxysmal A-fib (H)      PE (pulmonary thromboembolism) (H)        PAST SURGICAL HISTORY:   Past Surgical History:   Procedure Laterality Date     cardiac stenting       CV RIGHT HEART CATH MEASUREMENTS RECORDED N/A 11/5/2019    Procedure: Right Heart Cath;  Surgeon: Roberto Hernandez MD;  Location:  HEART CARDIAC CATH LAB     ENT SURGERY      tonsillectomy     IMPLANT PACEMAKER       INJECT NERVE BLOCK SUPRASCAPULAR Bilateral 8/26/2021    Procedure: BLOCK, NERVE, SUPRASCAPULAR, Bilateral, under ultrasound guidance;  Surgeon: Swetha Allen MD;  [place] [facility] [place]     ORTHOPEDIC SURGERY Right 2012    knee replacement       FAMILY HISTORY:   Family History   Problem Relation Age of Onset     Hypertension Father        SOCIAL HISTORY:   Social History     Tobacco Use     Smoking status: Former Smoker     Packs/day: 0.50     Years: 25.00     Pack years: 12.50     Smokeless tobacco: Never Used     Tobacco comment: quit in 1989   Substance Use Topics     Alcohol use: 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; recovering          Allergies   Allergen Reactions     No Known Allergies          Current Outpatient Medications:      acetaminophen (TYLENOL) 500 MG tablet, Take 1,000 mg by mouth 4 times daily , Disp: , Rfl:      Acetylcarnitine HCl (ACETYL L-CARNITINE) 500 MG CAPS, Take 1 capsule by mouth 2 times daily, Disp: , Rfl:      albuterol (PROAIR HFA/PROVENTIL HFA/VENTOLIN HFA) 108 (90 Base) MCG/ACT inhaler, Inhale 2 puffs into the lungs every 4 hours as needed for shortness of breath / dyspnea or wheezing, Disp: 1 Inhaler, Rfl: 0     aspirin (ASA) 81 MG chewable tablet, Take 1 tablet by mouth daily, Disp: , Rfl:      atorvastatin (LIPITOR) 40 MG tablet, TAKE 1 TABLET BY MOUTH EVERY EVENING, Disp: 90 tablet, Rfl: 2     carvedilol (COREG) 12.5 MG tablet, TAKE 1 TABLET BY MOUTH TWICE A DAY, Disp: 180 tablet, Rfl: 1     co-enzyme Q-10 100 MG CAPS capsule, Take 100 mg by mouth daily, Disp: ,  Rfl:      diclofenac (VOLTAREN) 1 % topical gel, Apply 1 g topically 2 times daily as needed for moderate pain, Disp: 50 g, Rfl: 0     diltiazem ER COATED BEADS (CARDIZEM CD/CARTIA XT) 300 MG 24 hr capsule, TAKE 1 CAPSULE BY MOUTH EVERY DAY, Disp: 90 capsule, Rfl: 3     Fluticasone-Umeclidin-Vilanterol (TRELEGY ELLIPTA) 200-62.5-25 MCG/INH oral inhaler, Inhale 1 puff into the lungs daily, Disp: , Rfl:      furosemide (LASIX) 20 MG tablet, Take 2 tablets (40 mg) by mouth 2 times daily, Disp: 360 tablet, Rfl: 3     isosorbide mononitrate (IMDUR) 30 MG 24 hr tablet, TAKE 1 TABLET BY MOUTH EVERY DAY, Disp: 90 tablet, Rfl: 3     levothyroxine (SYNTHROID/LEVOTHROID) 175 MCG tablet, Take 1 tablet (175 mcg) by mouth daily Additional 1/2 tablet on Sundays, Disp: 90 tablet, Rfl: 2     lidocaine (LMX4) 4 % external cream, Apply topically as needed for mild pain, Disp: , Rfl:      lisinopril (ZESTRIL) 40 MG tablet, Take 1 tablet (40 mg) by mouth daily, Disp: 90 tablet, Rfl: 3     metolazone (ZAROXOLYN) 2.5 MG tablet, Take 1 tablet (2.5 mg) by mouth 2 times daily, Disp: 180 tablet, Rfl: 3     Multiple vitamin TABS, Take 1 tablet by mouth daily, Disp: , Rfl:      order for DME, Oxygen 2 Li/min Continuous. At night bled into PAP device. In the daytime, NC with bubbler: requiring portability, Disp: 1 Device, Rfl: 0     sertraline (ZOLOFT) 25 MG tablet, Take 2 tablets (50 mg) by mouth daily, Disp: 180 tablet, Rfl: 1     spironolactone (ALDACTONE) 25 MG tablet, TAKE 1 TABLET (25 MG) BY MOUTH EVERY OTHER DAY, Disp: 45 tablet, Rfl: 0     tamsulosin (FLOMAX) 0.4 MG capsule, Take 1 capsule (0.4 mg) by mouth daily, Disp: 90 capsule, Rfl: 0     Taurine 500 MG CAPS, Take 500 mg by mouth 2 times daily, Disp: , Rfl:      warfarin ANTICOAGULANT (COUMADIN) 5 MG tablet, Take 1.5 tablets (7.5 mg) on Fridays and take 1 tablet (5 mg) all other days or as directed by the INR clinic, Disp: 100 tablet, Rfl: 0    Review Of Systems:  Skin: No rash,  pruritis, or skin pigmentation  Eyes: No changes in vision  Ears/Nose/Throat: No changes in hearing, no nosebleeds  Respiratory: No shortness of breath, dyspnea on exertion, cough, or hemoptysis  Cardiovascular: No chest pain or palpitations  Gastrointestinal: No diarrhea or constipation. No abdominal pain. No hematochezia  Genitourinary: see HPI  Musculoskeletal: No pain or swelling of joints, normal range of motion  Neurologic: No weakness or tremors  Psychiatric: No recent changes in memory or mood  Hematologic/Lymphatic/Immunologic: No easy bruising or enlarged lymph nodes  Endocrine: No weight gain or loss      PHYSICAL EXAM:    Ht 1.829 m (6')   Wt 129.3 kg (285 lb)   BMI 38.65 kg/m    General appearance: In NAD, conversant  HEENT: Normocephalic and atraumatic, anicteric sclera  Cardiovascular: Not examined  Respiratory: normal, non-labored breathing  Gastrointestinal: negative, Abdomen soft, non-tender, and non-distended.   Musculoskeletal: Not Examined  Peripheral Vascular/extremity: No peripheral edema  Skin: Normal temperature, turgor, and texture. No rash  Psychiatric: Appropriate affect, alert and oriented to person, place, and time    Cystoscopy: Not done    UA RESULTS:  Recent Labs   Lab Test 06/14/21  1516   COLOR Yellow   APPEARANCE Cloudy   URINEGLC Negative   URINEBILI Negative   URINEKETONE Negative   SG 1.020   UBLD Small*   URINEPH 5.0   PROTEIN 30*   UROBILINOGEN 0.2   NITRITE Negative   LEUKEST Moderate*   RBCU Unable to quantitate other elements due to packed WBC's. *   WBCU >100*       Bladder Scan:     Other Labs:      Imaging Studies: None      CLINICAL IMPRESSION:   82 yo M with LUTS including urinary frequency, urgency. Denies significant obstructive voiding symptoms. Is doing well on alpha blocker. Declines changes to medication regimen at this time. Had a UTI three months ago and prior to that one about 5 years ago. If continues to have UTI will need to do further workup. For now,  will ensure that he is emptying his bladder with a nurse visit for PVR    PLAN:   Continue tamsulosin 0.4 mg daily  Nurse visit for bladder scan PVR. If PVR <200 ml, can follow up in 1 year with UA, PVR, AUA symptom score  If PVR >200 ml, should see me back earlier for a cystoscopy    Sebastian Holland MD   [facility]  [phone] clinic phone    Video-Visit Details    Type of service:  Video Visit    Video End Time:11:41 AM    Originating Location (pt. Location): Home    Distant Location (provider location):  [facility] [place]     Platform used for Video Visit: ACE*COMM     Yes

## 2022-03-25 ENCOUNTER — OFFICE VISIT (OUTPATIENT)
Dept: URGENT CARE | Facility: URGENT CARE | Age: 82
End: 2022-03-25
Payer: MEDICARE

## 2022-03-25 ENCOUNTER — ANCILLARY PROCEDURE (OUTPATIENT)
Dept: GENERAL RADIOLOGY | Facility: CLINIC | Age: 82
End: 2022-03-25
Attending: NURSE PRACTITIONER
Payer: MEDICARE

## 2022-03-25 VITALS
DIASTOLIC BLOOD PRESSURE: 71 MMHG | TEMPERATURE: 99.3 F | WEIGHT: 266 LBS | SYSTOLIC BLOOD PRESSURE: 118 MMHG | BODY MASS INDEX: 36.08 KG/M2 | OXYGEN SATURATION: 94 % | HEART RATE: 73 BPM

## 2022-03-25 DIAGNOSIS — J06.9 UPPER RESPIRATORY TRACT INFECTION, UNSPECIFIED TYPE: ICD-10-CM

## 2022-03-25 DIAGNOSIS — D72.829 LEUKOCYTOSIS, UNSPECIFIED TYPE: ICD-10-CM

## 2022-03-25 DIAGNOSIS — R30.0 DYSURIA: Primary | ICD-10-CM

## 2022-03-25 LAB
ALBUMIN UR-MCNC: NEGATIVE MG/DL
APPEARANCE UR: CLEAR
BASOPHILS # BLD AUTO: 0 10E3/UL (ref 0–0.2)
BASOPHILS NFR BLD AUTO: 0 %
BILIRUB UR QL STRIP: NEGATIVE
COLOR UR AUTO: YELLOW
EOSINOPHIL # BLD AUTO: 0.2 10E3/UL (ref 0–0.7)
EOSINOPHIL NFR BLD AUTO: 2 %
ERYTHROCYTE [DISTWIDTH] IN BLOOD BY AUTOMATED COUNT: 15.8 % (ref 10–15)
GLUCOSE UR STRIP-MCNC: NEGATIVE MG/DL
HCT VFR BLD AUTO: 39.1 % (ref 40–53)
HGB BLD-MCNC: 12.6 G/DL (ref 13.3–17.7)
HGB UR QL STRIP: NEGATIVE
KETONES UR STRIP-MCNC: NEGATIVE MG/DL
LEUKOCYTE ESTERASE UR QL STRIP: NEGATIVE
LYMPHOCYTES # BLD AUTO: 1.6 10E3/UL (ref 0.8–5.3)
LYMPHOCYTES NFR BLD AUTO: 13 %
MCH RBC QN AUTO: 32.6 PG (ref 26.5–33)
MCHC RBC AUTO-ENTMCNC: 32.2 G/DL (ref 31.5–36.5)
MCV RBC AUTO: 101 FL (ref 78–100)
MONOCYTES # BLD AUTO: 1 10E3/UL (ref 0–1.3)
MONOCYTES NFR BLD AUTO: 8 %
NEUTROPHILS # BLD AUTO: 9.3 10E3/UL (ref 1.6–8.3)
NEUTROPHILS NFR BLD AUTO: 77 %
NITRATE UR QL: NEGATIVE
PH UR STRIP: 5.5 [PH] (ref 5–7)
PLATELET # BLD AUTO: 168 10E3/UL (ref 150–450)
RBC # BLD AUTO: 3.87 10E6/UL (ref 4.4–5.9)
SP GR UR STRIP: 1.01 (ref 1–1.03)
UROBILINOGEN UR STRIP-ACNC: 0.2 E.U./DL
WBC # BLD AUTO: 12.2 10E3/UL (ref 4–11)

## 2022-03-25 PROCEDURE — 71046 X-RAY EXAM CHEST 2 VIEWS: CPT | Performed by: RADIOLOGY

## 2022-03-25 PROCEDURE — 85025 COMPLETE CBC W/AUTO DIFF WBC: CPT

## 2022-03-25 PROCEDURE — 80053 COMPREHEN METABOLIC PANEL: CPT

## 2022-03-25 PROCEDURE — 99214 OFFICE O/P EST MOD 30 MIN: CPT

## 2022-03-25 PROCEDURE — 81003 URINALYSIS AUTO W/O SCOPE: CPT

## 2022-03-25 PROCEDURE — 36415 COLL VENOUS BLD VENIPUNCTURE: CPT

## 2022-03-25 ASSESSMENT — ENCOUNTER SYMPTOMS
GASTROINTESTINAL NEGATIVE: 1
FEVER: 0
NEUROLOGICAL NEGATIVE: 1
SHORTNESS OF BREATH: 0
BRUISES/BLEEDS EASILY: 1
PND: 0
SPUTUM PRODUCTION: 1
CHILLS: 0
COUGH: 1
MUSCULOSKELETAL NEGATIVE: 1
ORTHOPNEA: 0
WHEEZING: 0

## 2022-03-26 LAB
ALBUMIN SERPL-MCNC: 3.1 G/DL (ref 3.4–5)
ALP SERPL-CCNC: 85 U/L (ref 40–150)
ALT SERPL W P-5'-P-CCNC: 26 U/L (ref 0–70)
ANION GAP SERPL CALCULATED.3IONS-SCNC: 5 MMOL/L (ref 3–14)
AST SERPL W P-5'-P-CCNC: 13 U/L (ref 0–45)
BILIRUB SERPL-MCNC: 0.6 MG/DL (ref 0.2–1.3)
BUN SERPL-MCNC: 40 MG/DL (ref 7–30)
CALCIUM SERPL-MCNC: 9.2 MG/DL (ref 8.5–10.1)
CHLORIDE BLD-SCNC: 100 MMOL/L (ref 94–109)
CO2 SERPL-SCNC: 30 MMOL/L (ref 20–32)
CREAT SERPL-MCNC: 1.43 MG/DL (ref 0.66–1.25)
GFR SERPL CREATININE-BSD FRML MDRD: 49 ML/MIN/1.73M2
GLUCOSE BLD-MCNC: 93 MG/DL (ref 70–99)
POTASSIUM BLD-SCNC: 3.7 MMOL/L (ref 3.4–5.3)
PROT SERPL-MCNC: 6.9 G/DL (ref 6.8–8.8)
SODIUM SERPL-SCNC: 135 MMOL/L (ref 133–144)

## 2022-03-26 NOTE — PROGRESS NOTES
"HPI  Erich Craven 81 year old presents to the urgent care with chief complaint of upper respiratory infection and not feeling \"himself\".  He reports that he developed an upper respiratory infection approximately 1 week ago and continues to have a mild but generally nonproductive cough.  States he has been eating okay and denies any nausea, vomiting, diarrhea, fever, chills.  He does note that his weight is down a couple of pounds as he weighs himself each day to monitor for congestive heart failure and fluid retention he has not taken any medications for his symptoms.. He is remained on his normal medications as previously ordered.    Review of Systems   Constitutional: Positive for malaise/fatigue. Negative for chills and fever.   HENT: Negative.    Respiratory: Positive for cough and sputum production. Negative for shortness of breath and wheezing.    Cardiovascular: Positive for leg swelling. Negative for chest pain, orthopnea and PND.   Gastrointestinal: Negative.    Genitourinary: Negative.    Musculoskeletal: Negative.    Skin:        Patient has significant peripheral vascular disease with both lower extremities dusky in color and moderately edematous.   Neurological: Negative.    Endo/Heme/Allergies: Bruises/bleeds easily.        On anticoagulant therapy      Past Medical History:   Diagnosis Date     (HFpEF) heart failure with preserved ejection fraction (H)      Arthritis      BPH with urinary obstruction      CAD (coronary artery disease)     PCI w/ SUMMER in 2014     Cardiac pacemaker in situ      Chronic diastolic heart failure (H)      Complication of anesthesia      COPD (chronic obstructive pulmonary disease) (H)      Diabetes (H)      DVT (deep venous thrombosis) (H)      Dyspnea      Essential hypertension      Fatigue      HLD (hyperlipidemia)      Hx of long term use of blood thinners     Due to PE. Started in 2012     Hypothyroidism      Lymphedema      Mumps      Obesity, Class III, BMI 40-49.9 " (morbid obesity) (H)      RIN (obstructive sleep apnea)     has refused CPAP     Pacemaker      Paroxysmal A-fib (H)      PE (pulmonary thromboembolism) (H)      Patient Active Problem List   Diagnosis     Morbid obesity (H)     PE (pulmonary thromboembolism) (H)     HTN (hypertension)     Chronic diastolic heart failure (H)     Dyspnea     Fatigue     RIN (obstructive sleep apnea)     COPD (chronic obstructive pulmonary disease) (H)     Hypothyroidism     Paroxysmal A-fib (H)     HLD (hyperlipidemia)     Lymphedema     (HFpEF) heart failure with preserved ejection fraction (H)     Essential hypertension     Cardiac pacemaker in situ     DVT (deep venous thrombosis) (H)     CAD (coronary artery disease)     Obesity, Class III, BMI 40-49.9 (morbid obesity) (H)     Muscle tension headache     Heart failure with preserved ejection fraction, unspecified HF chronicity (H)     Status post coronary angiogram     Anxiety     Deep vein thrombosis (DVT) of distal vein of lower extremity, unspecified chronicity, unspecified laterality (H)     Diabetes mellitus, type 2 (H)     Chronic kidney disease, stage 3 (H)     Primary osteoarthritis of right shoulder     Chronic right shoulder pain     Long term current use of anticoagulants with INR goal of 2.0-3.0     Past Surgical History:   Procedure Laterality Date     cardiac stenting       CV RIGHT HEART CATH MEASUREMENTS RECORDED N/A 11/5/2019    Procedure: Right Heart Cath;  Surgeon: Roberto Hernandez MD;  Location:  HEART CARDIAC CATH LAB     ENT SURGERY      tonsillectomy     IMPLANT PACEMAKER       INJECT NERVE BLOCK SUPRASCAPULAR Bilateral 8/26/2021    Procedure: BLOCK, NERVE, SUPRASCAPULAR, Bilateral, under ultrasound guidance;  Surgeon: Swetha Allen MD;  Location: [facility] OR     INJECT NERVE BLOCK SUPRASCAPULAR Bilateral 9/23/2021    Procedure: bilateral suprascapular nerve block;  Surgeon: Swetha Allen MD;  Location: [facility] OR     NERVE BLOCK PERIPHERAL Bilateral  3/10/2022    Procedure: bilateral suprascapular nerve block;  Surgeon: Swetha Allen MD;  Location: [facility]     ORTHOPEDIC SURGERY Right 2012    knee replacement     Allergies   Allergen Reactions     No Known Allergies      Current Outpatient Medications   Medication     amoxicillin-clavulanate (AUGMENTIN) 875-125 MG tablet     acetaminophen (TYLENOL) 500 MG tablet     aspirin (ASA) 81 MG chewable tablet     atorvastatin (LIPITOR) 40 MG tablet     carvedilol (COREG) 12.5 MG tablet     co-enzyme Q-10 100 MG CAPS capsule     diltiazem ER COATED BEADS (CARDIZEM CD/CARTIA XT) 300 MG 24 hr capsule     Fluticasone-Umeclidin-Vilanterol (TRELEGY ELLIPTA) 200-62.5-25 MCG/INH oral inhaler     furosemide (LASIX) 20 MG tablet     levothyroxine (SYNTHROID/LEVOTHROID) 175 MCG tablet     lisinopril (ZESTRIL) 20 MG tablet     metolazone (ZAROXOLYN) 2.5 MG tablet     Multiple vitamin TABS     order for DME     spironolactone (ALDACTONE) 25 MG tablet     tamsulosin (FLOMAX) 0.4 MG capsule     traMADol (ULTRAM) 50 MG tablet     venlafaxine (EFFEXOR) 37.5 MG tablet     warfarin ANTICOAGULANT (COUMADIN) 5 MG tablet     No current facility-administered medications for this visit.     Allergies   Allergen Reactions     No Known Allergies          Physical Exam  Vitals and nursing note reviewed.   Constitutional:       General: He is not in acute distress.     Appearance: He is not ill-appearing.      Comments: /71   Pulse 73   Temp 99.3  F (37.4  C) (Oral)   Wt 120.7 kg (266 lb)   SpO2 94%   BMI 36.08 kg/m       HENT:      Head: Normocephalic.      Mouth/Throat:      Mouth: Mucous membranes are moist.   Eyes:      Extraocular Movements: Extraocular movements intact.      Pupils: Pupils are equal, round, and reactive to light.   Cardiovascular:      Rate and Rhythm: Normal rate.   Pulmonary:      Effort: Pulmonary effort is normal. No respiratory distress.      Breath sounds: No stridor. Rales present. No wheezing or rhonchi.       Comments: Fine Rales are heard in the bases bilaterally  Musculoskeletal:      Cervical back: Normal range of motion.      Comments: Ambulates with cane as an assistive device but without difficulty   Skin:     Comments: Lower extremities are dusky and evident of peripheral vascular insufficiency.  They are noted to be warm and there is a slight break in the physical integrity just superior to the right ankle.   Neurological:      General: No focal deficit present.      Mental Status: He is alert and oriented to person, place, and time.       Results for orders placed or performed in visit on 03/25/22   XR Chest 2 Views     Status: None    Narrative    EXAM: XR CHEST 2 VW  LOCATION: [facility] [place]  DATE/TIME: 3/25/2022 7:34 PM    INDICATION: hx chf, today noted bilateral rales  COMPARISON: 03/24/2021      Impression    IMPRESSION: Heart size and pulmonary vascularity normal. Subsegmental atelectasis left base. Elevated right hemidiaphragm. No acute infiltrates or effusions. Pacemaker lead tips right atrium and right ventricle.   UA reflex to Microscopic and Culture     Status: Normal    Specimen: Urine, Midstream   Result Value Ref Range    Color Urine Yellow Colorless, Straw, Light Yellow, Yellow    Appearance Urine Clear Clear    Glucose Urine Negative Negative mg/dL    Bilirubin Urine Negative Negative    Ketones Urine Negative Negative mg/dL    Specific Gravity Urine 1.015 1.003 - 1.035    Blood Urine Negative Negative    pH Urine 5.5 5.0 - 7.0    Protein Albumin Urine Negative Negative mg/dL    Urobilinogen Urine 0.2 0.2, 1.0 E.U./dL    Nitrite Urine Negative Negative    Leukocyte Esterase Urine Negative Negative    Narrative    Microscopic not indicated   CBC with platelets and differential     Status: Abnormal   Result Value Ref Range    WBC Count 12.2 (H) 4.0 - 11.0 10e3/uL    RBC Count 3.87 (L) 4.40 - 5.90 10e6/uL    Hemoglobin 12.6 (L) 13.3 - 17.7 g/dL    Hematocrit 39.1 (L) 40.0  - 53.0 %     (H) 78 - 100 fL    MCH 32.6 26.5 - 33.0 pg    MCHC 32.2 31.5 - 36.5 g/dL    RDW 15.8 (H) 10.0 - 15.0 %    Platelet Count 168 150 - 450 10e3/uL    % Neutrophils 77 %    % Lymphocytes 13 %    % Monocytes 8 %    % Eosinophils 2 %    % Basophils 0 %    Absolute Neutrophils 9.3 (H) 1.6 - 8.3 10e3/uL    Absolute Lymphocytes 1.6 0.8 - 5.3 10e3/uL    Absolute Monocytes 1.0 0.0 - 1.3 10e3/uL    Absolute Eosinophils 0.2 0.0 - 0.7 10e3/uL    Absolute Basophils 0.0 0.0 - 0.2 10e3/uL   CBC with platelets and differential     Status: Abnormal    Narrative    The following orders were created for panel order CBC with platelets and differential.  Procedure                               Abnormality         Status                     ---------                               -----------         ------                     CBC with platelets and d...[[id number]]  Abnormal            Final result                 Please view results for these tests on the individual orders.     Assessment:  1. Dysuria    2. Upper respiratory tract infection, unspecified type    3. Leukocytosis, unspecified type    With mildly elevated WBC and extensive co-morbidities, an antibiotic is being initiated.     Plan:  Orders Placed This Encounter     XR Chest 2 Views     UA reflex to Microscopic and Culture     Comprehensive metabolic panel (BMP + Alb, Alk Phos, ALT, AST, Total. Bili, TP)     CBC with platelets and differential     amoxicillin-clavulanate (AUGMENTIN) 875-125 MG tablet   Recheck with PCP early next week for follow up.   Instructions regarding self-care of patient reviewed.   Written instructions provided in after visit summary and reviewed.  Patient instructed to see primary care provider for new or persistent symptoms.   Red flag symptoms reviewed and patient has been instructed to seek emergent care  Please contact pharmacy for medication questions.  Patient instructed to take medications as directed on package.    Continue  other medications as previously prescribed.    Krystal Bush, DNP, APRN, CNP

## 2022-03-27 ENCOUNTER — HOSPITAL ENCOUNTER (EMERGENCY)
Facility: CLINIC | Age: 82
Discharge: HOME | End: 2022-03-27
Attending: EMERGENCY MEDICINE | Admitting: EMERGENCY MEDICINE
Payer: MEDICARE

## 2022-03-27 ENCOUNTER — APPOINTMENT (OUTPATIENT)
Dept: GENERAL RADIOLOGY | Facility: CLINIC | Age: 82
End: 2022-03-27
Attending: EMERGENCY MEDICINE
Payer: MEDICARE

## 2022-03-27 VITALS
WEIGHT: 264 LBS | SYSTOLIC BLOOD PRESSURE: 110 MMHG | HEART RATE: 73 BPM | DIASTOLIC BLOOD PRESSURE: 66 MMHG | RESPIRATION RATE: 25 BRPM | OXYGEN SATURATION: 94 % | BODY MASS INDEX: 37.8 KG/M2 | TEMPERATURE: 97.2 F | HEIGHT: 70 IN

## 2022-03-27 DIAGNOSIS — R60.0 BILATERAL LEG EDEMA: ICD-10-CM

## 2022-03-27 DIAGNOSIS — T14.8XXA OPEN WOUND: ICD-10-CM

## 2022-03-27 DIAGNOSIS — R53.83 OTHER FATIGUE: ICD-10-CM

## 2022-03-27 LAB
ALBUMIN SERPL-MCNC: 3.2 G/DL (ref 3.4–5)
ALP SERPL-CCNC: 90 U/L (ref 40–150)
ALT SERPL W P-5'-P-CCNC: 22 U/L (ref 0–70)
ANION GAP SERPL CALCULATED.3IONS-SCNC: 5 MMOL/L (ref 3–14)
AST SERPL W P-5'-P-CCNC: 14 U/L (ref 0–45)
BASOPHILS # BLD AUTO: 0 10E3/UL (ref 0–0.2)
BASOPHILS NFR BLD AUTO: 0 %
BILIRUB SERPL-MCNC: 0.4 MG/DL (ref 0.2–1.3)
BUN SERPL-MCNC: 54 MG/DL (ref 7–30)
CALCIUM SERPL-MCNC: 8.8 MG/DL (ref 8.5–10.1)
CHLORIDE BLD-SCNC: 96 MMOL/L (ref 94–109)
CO2 SERPL-SCNC: 32 MMOL/L (ref 20–32)
CREAT SERPL-MCNC: 1.69 MG/DL (ref 0.66–1.25)
EOSINOPHIL # BLD AUTO: 0.2 10E3/UL (ref 0–0.7)
EOSINOPHIL NFR BLD AUTO: 2 %
ERYTHROCYTE [DISTWIDTH] IN BLOOD BY AUTOMATED COUNT: 15.7 % (ref 10–15)
FLUAV RNA SPEC QL NAA+PROBE: NEGATIVE
FLUBV RNA RESP QL NAA+PROBE: NEGATIVE
GFR SERPL CREATININE-BSD FRML MDRD: 40 ML/MIN/1.73M2
GLUCOSE BLD-MCNC: 113 MG/DL (ref 70–99)
HCT VFR BLD AUTO: 37.9 % (ref 40–53)
HGB BLD-MCNC: 11.7 G/DL (ref 13.3–17.7)
IMM GRANULOCYTES # BLD: 0.1 10E3/UL
IMM GRANULOCYTES NFR BLD: 1 %
INR PPP: 2.72 (ref 0.85–1.15)
LYMPHOCYTES # BLD AUTO: 1.4 10E3/UL (ref 0.8–5.3)
LYMPHOCYTES NFR BLD AUTO: 14 %
MCH RBC QN AUTO: 30.9 PG (ref 26.5–33)
MCHC RBC AUTO-ENTMCNC: 30.9 G/DL (ref 31.5–36.5)
MCV RBC AUTO: 100 FL (ref 78–100)
MONOCYTES # BLD AUTO: 0.9 10E3/UL (ref 0–1.3)
MONOCYTES NFR BLD AUTO: 9 %
NEUTROPHILS # BLD AUTO: 7.6 10E3/UL (ref 1.6–8.3)
NEUTROPHILS NFR BLD AUTO: 74 %
NRBC # BLD AUTO: 0 10E3/UL
NRBC BLD AUTO-RTO: 0 /100
NT-PROBNP SERPL-MCNC: 154 PG/ML (ref 0–1800)
PLATELET # BLD AUTO: 177 10E3/UL (ref 150–450)
POTASSIUM BLD-SCNC: 3.9 MMOL/L (ref 3.4–5.3)
PROT SERPL-MCNC: 7 G/DL (ref 6.8–8.8)
RBC # BLD AUTO: 3.79 10E6/UL (ref 4.4–5.9)
RSV RNA SPEC NAA+PROBE: NEGATIVE
SARS-COV-2 RNA RESP QL NAA+PROBE: NEGATIVE
SODIUM SERPL-SCNC: 133 MMOL/L (ref 133–144)
TROPONIN I SERPL HS-MCNC: 5 NG/L
WBC # BLD AUTO: 10.2 10E3/UL (ref 4–11)

## 2022-03-27 PROCEDURE — 87637 SARSCOV2&INF A&B&RSV AMP PRB: CPT | Performed by: EMERGENCY MEDICINE

## 2022-03-27 PROCEDURE — 80053 COMPREHEN METABOLIC PANEL: CPT | Performed by: EMERGENCY MEDICINE

## 2022-03-27 PROCEDURE — 84484 ASSAY OF TROPONIN QUANT: CPT | Performed by: EMERGENCY MEDICINE

## 2022-03-27 PROCEDURE — 93005 ELECTROCARDIOGRAM TRACING: CPT

## 2022-03-27 PROCEDURE — 85025 COMPLETE CBC W/AUTO DIFF WBC: CPT | Performed by: EMERGENCY MEDICINE

## 2022-03-27 PROCEDURE — 83880 ASSAY OF NATRIURETIC PEPTIDE: CPT | Performed by: EMERGENCY MEDICINE

## 2022-03-27 PROCEDURE — 71046 X-RAY EXAM CHEST 2 VIEWS: CPT

## 2022-03-27 PROCEDURE — 99285 EMERGENCY DEPT VISIT HI MDM: CPT | Mod: 25

## 2022-03-27 PROCEDURE — C9803 HOPD COVID-19 SPEC COLLECT: HCPCS

## 2022-03-27 PROCEDURE — 85610 PROTHROMBIN TIME: CPT | Performed by: EMERGENCY MEDICINE

## 2022-03-27 PROCEDURE — 36415 COLL VENOUS BLD VENIPUNCTURE: CPT | Performed by: EMERGENCY MEDICINE

## 2022-03-27 ASSESSMENT — ENCOUNTER SYMPTOMS
APPETITE CHANGE: 1
SHORTNESS OF BREATH: 0
FEVER: 1
BLOOD IN STOOL: 0
FATIGUE: 1
WOUND: 1
LIGHT-HEADEDNESS: 0
COUGH: 1
DIARRHEA: 0
UNEXPECTED WEIGHT CHANGE: 1
DIAPHORESIS: 0

## 2022-03-27 NOTE — ED TRIAGE NOTES
"pt reports recent vascular procedure on left leg - pt reports feeling fatigue, cough, sob and increased swelling to left leg x couple days. went to urgent care on friday - pt states \"they heard a crackle and put me on antibiotics\"  "

## 2022-03-27 NOTE — ED PROVIDER NOTES
"  History   Chief Complaint:  Generalized Weakness (pt reports recent vascular procedure on left leg - pt reports feeling fatigue, cough, sob and increased swelling to left leg x couple days. went to urgent care on friday - pt states \"they heard a crackle and put me on antibiotics\")       HPI   Erich Craven is a 81 year old male with history of COPD, CAD, S/P stent placement, diabetes, hypertension, chronic diastolic heart failure, DVT, PE, and atrial fibrillation, on Coumadin, who presents with fatigue and wound. On 1/31/22, the patient had a right lower extremity venous insufficiency treatment by Dr. Palencia with venous ablation and sclerotherapy. Since then, he has still been having consistent venous issues with both of his lower extremities. Over the past week, he has had a wound on his left lower extremity that has begun to leak. He also notes bilateral leg swelling as well as a five pound weight increase over the past 3-4 days despite not having much of an appetite. He was seen at Urgent Care on 3/25 for cough and congestion where he had a sight fever and after a chest XR he was placed on Augmentin for a URI. He is not currently immunosuppressed and is not using chronic steroid use. He is vaccinated x2 and boosted. While in the ED, he states he has become increasingly fatigued and also feels dehydrated. He does feel his urine output has decreased. At home, he uses a Bi-Pap machine. He denies any recent night sweats, shortness of breath, chest pain, bloody stools, diarrhea, and light-headedness.       Review of Systems   Constitutional: Positive for appetite change, fatigue, fever and unexpected weight change. Negative for diaphoresis.   HENT: Positive for congestion.    Respiratory: Positive for cough. Negative for shortness of breath.    Cardiovascular: Negative for chest pain.   Gastrointestinal: Negative for blood in stool and diarrhea.   Genitourinary: Positive for decreased urine volume.   Skin: " "Positive for wound.   Neurological: Negative for light-headedness.   All other systems reviewed and are negative.      Allergies:  The patient has no known allergies.     Medications:  Aspirin 81 mg  Lipitor  Coreg  Diltiazem   Trelegy Ellipta inhaler  Lasix  Levothyroxine   Lisinopril  Metolazone  Spironolactone   Flomax  Tramadol  Effexor  Coumadin     Past Medical History:     Heart failure with preserved ejection fraction   Arthritis  BPH with urinary obstruction   CAD  Chronic diastolic heart failure  COPD  Diabetes  DVT  Hypertension   Fatigue  Hyperlipidemia   Hypothyroidism   Lymphedema  Mumps  RIN  Atrial fibrillation   PE  CKD, stage 3  Primary osteoarthritis of right shoulder      Past Surgical History:    Cardiac stenting   Tonsillectomy  Pacemaker implant   Bilateral suprascapular nerve block x3  Right knee replacement      Family History:    Father: hypertension     Social History:  The patient presents to the ED alone.      Physical Exam     Patient Vitals for the past 24 hrs:   BP Temp Temp src Pulse Resp SpO2 Height Weight   03/27/22 1830 110/66 -- -- -- -- 94 % -- --   03/27/22 1745 128/61 -- -- -- -- 94 % -- --   03/27/22 1700 119/53 -- -- 73 -- 91 % -- --   03/27/22 1630 109/58 -- -- 72 -- 93 % -- --   03/27/22 1616 113/57 -- -- 76 -- 94 % -- --   03/27/22 1615 113/57 -- -- 76 -- 92 % -- --   03/27/22 1552 117/64 97.2  F (36.2  C) Temporal 85 25 95 % 1.778 m (5' 10\") 119.7 kg (264 lb)     Physical Exam  SKIN:  Warm, dry.  Left lower extremity 2 cm shallow wound at the distal medial aspect.  No active drainage from the wound.  No associated fluctuance.  No surrounding erythema.  HEMATOLOGIC/IMMUNOLOGIC/LYMPHATIC:  No pallor.  Bilateral lower extremity pitting edema, symmetric.  HENT: No jugular venous distention.  EYES:  Conjunctivae normal.  CARDIOVASCULAR:  Regular rate and rhythm.  No murmur.  No rub.  RESPIRATORY:  No respiratory distress, breath sounds equal and normal.  GASTROINTESTINAL:  " Soft, nontender abdomen.  MUSCULOSKELETAL: Overweight body habitus.  Nontender left lower limb to palpation about the wound.  NEUROLOGIC:  Alert, conversant.  PSYCHIATRIC:  Normal mood.    Emergency Department Course   ECG  ECG taken at 1826, ECG read at 1828  Normal sinus rhythm.   ST & T wave abnormality, consider lateral ischemia (artifact)  Prolonged QT  Abnormal ECG.    No significant change as compared to prior, dated 2/1/2020.  Rate 71 bpm. RI interval 202 ms. QRS duration 92 ms. QT/QTc 448/486 ms. P-R-T axes 35 42 27.       Imaging:  Chest XR,  PA & LAT   Final Result   IMPRESSION: Subclavian approach pacing device. No pneumothorax or pleural effusion. No focal consolidation. Mild pulmonary vascular congestion.        Report per radiology    Laboratory:  Labs Ordered and Resulted from Time of ED Arrival to Time of ED Departure   COMPREHENSIVE METABOLIC PANEL - Abnormal       Result Value    Sodium 133      Potassium 3.9      Chloride 96      Carbon Dioxide (CO2) 32      Anion Gap 5      Urea Nitrogen 54 (*)     Creatinine 1.69 (*)     Calcium 8.8      Glucose 113 (*)     Alkaline Phosphatase 90      AST 14      ALT 22      Protein Total 7.0      Albumin 3.2 (*)     Bilirubin Total 0.4      GFR Estimate 40 (*)    INR - Abnormal    INR 2.72 (*)    CBC WITH PLATELETS AND DIFFERENTIAL - Abnormal    WBC Count 10.2      RBC Count 3.79 (*)     Hemoglobin 11.7 (*)     Hematocrit 37.9 (*)           MCH 30.9      MCHC 30.9 (*)     RDW 15.7 (*)     Platelet Count 177      % Neutrophils 74      % Lymphocytes 14      % Monocytes 9      % Eosinophils 2      % Basophils 0      % Immature Granulocytes 1      NRBCs per 100 WBC 0      Absolute Neutrophils 7.6      Absolute Lymphocytes 1.4      Absolute Monocytes 0.9      Absolute Eosinophils 0.2      Absolute Basophils 0.0      Absolute Immature Granulocytes 0.1      Absolute NRBCs 0.0     TROPONIN I - Normal    Troponin I High Sensitivity 5     NT PROBNP INPATIENT -  Normal    N terminal Pro BNP Inpatient 154     INFLUENZA A/B & SARS-COV2 PCR MULTIPLEX - Normal    Influenza A PCR Negative      Influenza B PCR Negative      RSV PCR Negative      SARS CoV2 PCR Negative            Emergency Department Course:             Reviewed:  I reviewed nursing notes, vitals and past medical history    Assessments:  1640 I obtained history and examined the patient as noted above.   1811 I rechecked the patient and explained findings.     Disposition:  The patient was discharged to home.     Impression & Plan     CMS Diagnoses: None    Medical Decision Making:  This patient presents with concern of edema of the lower limbs with a wound of the left lower extremity in addition to generalized malaise and fatigue.  Also experiencing upper respiratory tract congestion/infection symptoms for a little over a week for which he is taking Augmentin for the past 2 days.  As far as the edema and wound in his symptoms I considered congestive heart failure which she has a history of.  However testing is not consistent with that.  He has been compliant with his diuretics and he takes relatively high dose Lasix 40 mg twice a day in addition to spironolactone.  Given his renal impairment I was not inclined to increase the dosing of his diuretics with concern of worsening his renal function.  With respect to the wound it does not appear infected in my opinion and he lacks fever and his white blood cell count is normal.  He is anticoagulated on Coumadin and is therapeutic so I doubt deep vein thrombus.  I did not think he required ultrasound tonight.  Cardiac testing reassuring.  Covid and flu testing negative.  I think the patient can be discharged and he agrees.  I advised follow-up with the vascular clinic regarding his edema and the wound.  To return here in the meantime if any concerns or if worsening in general.    Diagnosis:    ICD-10-CM    1. Bilateral leg edema  R60.0    2. Open wound  T14.8XXA    3.  Other fatigue  R53.83        Discharge Medications:  Discharge Medication List as of 3/27/2022  6:39 PM          Scribe Disclosure:  I, Daria Shepard, am serving as a scribe at 4:08 PM on 3/27/2022 to document services personally performed by Malik Cullen MD based on my observations and the provider's statements to me.              Malik Cullen MD  03/27/22 1959

## 2022-03-28 ENCOUNTER — DOCUMENTATION ONLY (OUTPATIENT)
Dept: ANTICOAGULATION | Facility: CLINIC | Age: 82
End: 2022-03-28
Payer: MEDICARE

## 2022-03-28 ENCOUNTER — PATIENT OUTREACH (OUTPATIENT)
Dept: FAMILY MEDICINE | Facility: CLINIC | Age: 82
End: 2022-03-28

## 2022-03-28 LAB
ATRIAL RATE - MUSE: 71 BPM
DIASTOLIC BLOOD PRESSURE - MUSE: NORMAL MMHG
INTERPRETATION ECG - MUSE: NORMAL
P AXIS - MUSE: 35 DEGREES
PR INTERVAL - MUSE: 202 MS
QRS DURATION - MUSE: 92 MS
QT - MUSE: 448 MS
QTC - MUSE: 486 MS
R AXIS - MUSE: 42 DEGREES
SYSTOLIC BLOOD PRESSURE - MUSE: NORMAL MMHG
T AXIS - MUSE: 27 DEGREES
VENTRICULAR RATE- MUSE: 71 BPM

## 2022-03-28 NOTE — PROGRESS NOTES
ANTICOAGULATION  MANAGEMENT: Discharge Review    Erich Craven chart reviewed for anticoagulation continuity of care    Emergency room visit on 3/27/22 for fatigue, cough, sob, increased swelling left leg with weeping wound (no infection noted)    Discharge disposition: Home    Results:    Recent labs: (last 7 days)     03/27/22  1600   INR 2.72*     Anticoagulation inpatient management:     not applicable     Anticoagulation discharge instructions:     Warfarin dosing: home regimen continued   Bridging: No   INR goal change: No      Medication changes affecting anticoagulation: Yes, Augmentin prescribed at  visit 3/25/22.  Penicillins may enhance the anticoagulant effect of Vitamin K Antagonists. Severity Moderate Reliability Rating Good; however, not on our high interaction list so [facility] was not notified when originally prescribed.    Additional factors affecting anticoagulation: No    Plan     No adjustment to anticoagulation plan needed  Recommend to check INR on 4/7/22, as scheduled. Patient not contacted. INR checked 48 hrs after starting abx and therapeutic.     No adjustment to Anticoagulation Calendar was required    Paola Bee RN

## 2022-03-28 NOTE — TELEPHONE ENCOUNTER
What type of discharge? Emergency Department  Risk of Hospital admission or ED visit: 76%  Is a TCM episode required? No  When should the patient follow up with PCP? within 30 days of discharge.    Keisha Anderson RN  [facility]

## 2022-03-29 ENCOUNTER — OFFICE VISIT (OUTPATIENT)
Dept: FAMILY MEDICINE | Facility: CLINIC | Age: 82
End: 2022-03-29
Payer: MEDICARE

## 2022-03-29 ENCOUNTER — TRANSFERRED RECORDS (OUTPATIENT)
Dept: HEALTH INFORMATION MANAGEMENT | Facility: CLINIC | Age: 82
End: 2022-03-29

## 2022-03-29 VITALS
WEIGHT: 260 LBS | HEART RATE: 70 BPM | SYSTOLIC BLOOD PRESSURE: 103 MMHG | OXYGEN SATURATION: 94 % | HEIGHT: 70 IN | TEMPERATURE: 96.8 F | BODY MASS INDEX: 37.22 KG/M2 | RESPIRATION RATE: 18 BRPM | DIASTOLIC BLOOD PRESSURE: 62 MMHG

## 2022-03-29 DIAGNOSIS — D72.829 LEUKOCYTOSIS, UNSPECIFIED TYPE: ICD-10-CM

## 2022-03-29 DIAGNOSIS — J06.9 UPPER RESPIRATORY TRACT INFECTION, UNSPECIFIED TYPE: ICD-10-CM

## 2022-03-29 DIAGNOSIS — M79.89 LEFT LEG SWELLING: Primary | ICD-10-CM

## 2022-03-29 DIAGNOSIS — Z23 NEED FOR VACCINATION: ICD-10-CM

## 2022-03-29 LAB
BASOPHILS # BLD AUTO: 0 10E3/UL (ref 0–0.2)
BASOPHILS NFR BLD AUTO: 0 %
CRP SERPL-MCNC: 35 MG/L (ref 0–8)
EOSINOPHIL # BLD AUTO: 0.2 10E3/UL (ref 0–0.7)
EOSINOPHIL NFR BLD AUTO: 2 %
ERYTHROCYTE [DISTWIDTH] IN BLOOD BY AUTOMATED COUNT: 16.2 % (ref 10–15)
HCT VFR BLD AUTO: 40.6 % (ref 40–53)
HGB BLD-MCNC: 12.7 G/DL (ref 13.3–17.7)
LYMPHOCYTES # BLD AUTO: 1.9 10E3/UL (ref 0.8–5.3)
LYMPHOCYTES NFR BLD AUTO: 16 %
MCH RBC QN AUTO: 32 PG (ref 26.5–33)
MCHC RBC AUTO-ENTMCNC: 31.3 G/DL (ref 31.5–36.5)
MCV RBC AUTO: 102 FL (ref 78–100)
MONOCYTES # BLD AUTO: 1.1 10E3/UL (ref 0–1.3)
MONOCYTES NFR BLD AUTO: 9 %
NEUTROPHILS # BLD AUTO: 8.4 10E3/UL (ref 1.6–8.3)
NEUTROPHILS NFR BLD AUTO: 72 %
PLATELET # BLD AUTO: 194 10E3/UL (ref 150–450)
RBC # BLD AUTO: 3.97 10E6/UL (ref 4.4–5.9)
WBC # BLD AUTO: 11.7 10E3/UL (ref 4–11)

## 2022-03-29 PROCEDURE — 80048 BASIC METABOLIC PNL TOTAL CA: CPT | Performed by: INTERNAL MEDICINE

## 2022-03-29 PROCEDURE — 99214 OFFICE O/P EST MOD 30 MIN: CPT | Performed by: INTERNAL MEDICINE

## 2022-03-29 PROCEDURE — 85025 COMPLETE CBC W/AUTO DIFF WBC: CPT | Performed by: INTERNAL MEDICINE

## 2022-03-29 PROCEDURE — 36415 COLL VENOUS BLD VENIPUNCTURE: CPT | Performed by: INTERNAL MEDICINE

## 2022-03-29 PROCEDURE — 86140 C-REACTIVE PROTEIN: CPT | Performed by: INTERNAL MEDICINE

## 2022-03-29 ASSESSMENT — PAIN SCALES - GENERAL: PAINLEVEL: SEVERE PAIN (6)

## 2022-03-29 NOTE — PROGRESS NOTES
Assessment & Plan   Problem List Items Addressed This Visit     None      Visit Diagnoses     Left leg swelling    -  Primary    Relevant Medications    amoxicillin-clavulanate (AUGMENTIN) 875-125 MG tablet    Other Relevant Orders    CBC with platelets and differential (Completed)    CRP, inflammation (Completed)    Basic metabolic panel  (Ca, Cl, CO2, Creat, Gluc, K, Na, BUN)    Need for vaccination        Upper respiratory tract infection, unspecified type        Leukocytosis, unspecified type        Relevant Medications    amoxicillin-clavulanate (AUGMENTIN) 875-125 MG tablet         Advised follow up with vascular surgery for left leg swelling  ?open sore ?cellulitis of LLE, though WBC was normal last checked, currently on Augmentin,   Also on chronic anticoagulation doubt blood clotting,   Sore in left lower extremity ?due to vascular venous procedure done recently.  Will check again blood test, Inflammatory marker  Has some basal rales, but his PorBNP was normal and CXR not suggestive of CHF exacerbation  Hemodynamics stable, BP on low side  EKG shows lateral st depression, had seen cards in [place] and was reassured about his recent stress test, advised follow up with cardiology, currently has no CP, troponin was negative,   Also had done further evaluation of HCM at [facility]  ,          See Patient Instructions    Return if symptoms worsen or fail to improve, for As needed and if symptoms worsen.    Anne Heath MD  [facility]  Total time was 31 minutes, review of records, exam and recommendations  Subjective   Jaun is a 81 year old who presents for the following health issues  accompanied by his former wife .    HPI       ED/UC Followup:  Facility:  [facility] Emergency [facility]  Date of visit: 03/27/2022  Reason for visit: Bilateral leg edema  Open wound  Other fatigue    Current Status:improved     Presenting for follow up, cough is better, completing 5 days of Augmentin,  "  Has left leg swelling and pain, had venous procedure by vascular medicine in left leg, and is due for follow up with vascular surgery, we have no records of venous procedure done. Has pain and swelling of left lower extremity, he is on chronic anticoagulation.      Review of Systems   Constitutional, HEENT, cardiovascular, pulmonary, gi and gu systems are negative, except as otherwise noted.      Objective    /62 (BP Location: Left arm, Patient Position: Chair, Cuff Size: Adult Large)   Pulse 70   Temp 96.8  F (36  C) (Temporal)   Resp 18   Ht 1.778 m (5' 10\")   Wt 117.9 kg (260 lb)   SpO2 94%   BMI 37.31 kg/m    Body mass index is 37.31 kg/m .  Physical Exam   GENERAL: healthy, alert and no distress  EYES: Eyes grossly normal to inspection, PERRL and conjunctivae and sclerae normal  RESP: no wheezes and rales bibasilar  CV: regular rate and rhythm, normal S1 S2, no S3 or S4, no murmur, click or rub, no peripheral edema and peripheral pulses strong  ABDOMEN: soft, nontender,  MS/SKIN: no gross musculoskeletal defects noted, positive edema of Left leg, there is 2 healing scabs on anterior chin, also some tenderness over distal chin, area of ? Crust on medial distal chin, ?site of blister. There is some dark discoloration of skin LLE>RLE,   NEURO: Normal strength and tone, mentation intact and speech normal  PSYCH: mentation appears normal, affect normal/bright    Admission on 03/27/2022, Discharged on 03/27/2022   Component Date Value Ref Range Status     Sodium 03/27/2022 133  133 - 144 mmol/L Final     Potassium 03/27/2022 3.9  3.4 - 5.3 mmol/L Final     Chloride 03/27/2022 96  94 - 109 mmol/L Final     Carbon Dioxide (CO2) 03/27/2022 32  20 - 32 mmol/L Final     Anion Gap 03/27/2022 5  3 - 14 mmol/L Final     Urea Nitrogen 03/27/2022 54 (A) 7 - 30 mg/dL Final     Creatinine 03/27/2022 1.69 (A) 0.66 - 1.25 mg/dL Final     Calcium 03/27/2022 8.8  8.5 - 10.1 mg/dL Final     Glucose 03/27/2022 113 (A) " 70 - 99 mg/dL Final     Alkaline Phosphatase 03/27/2022 90  40 - 150 U/L Final     AST 03/27/2022 14  0 - 45 U/L Final     ALT 03/27/2022 22  0 - 70 U/L Final     Protein Total 03/27/2022 7.0  6.8 - 8.8 g/dL Final     Albumin 03/27/2022 3.2 (A) 3.4 - 5.0 g/dL Final     Bilirubin Total 03/27/2022 0.4  0.2 - 1.3 mg/dL Final     GFR Estimate 03/27/2022 40 (A) >60 mL/min/1.73m2 Final    Effective December 21, 2021 eGFRcr in adults is calculated using the 2021 CKD-EPI creatinine equation which includes age and gender (Juwan et al., NE, DOI: 10.1056/NTDQgw1666333)     Troponin I High Sensitivity 03/27/2022 5  <79 ng/L Final    This Troponin-I result was obtained using a Siemens Dimension Vista High Sensitivity Troponin-I assay (TNIH). Effective 11/23/21, nine labs/sites in the [facility] switched from a Siemens [place] Contemporary Troponin I assay (CTNI) to a Siemens [place] High-Sensitivity Troponin I assay (TNIH).     INR 03/27/2022 2.72 (A) 0.85 - 1.15 Final     WBC Count 03/27/2022 10.2  4.0 - 11.0 10e3/uL Final     RBC Count 03/27/2022 3.79 (A) 4.40 - 5.90 10e6/uL Final     Hemoglobin 03/27/2022 11.7 (A) 13.3 - 17.7 g/dL Final     Hematocrit 03/27/2022 37.9 (A) 40.0 - 53.0 % Final     MCV 03/27/2022 100  78 - 100 fL Final     MCH 03/27/2022 30.9  26.5 - 33.0 pg Final     MCHC 03/27/2022 30.9 (A) 31.5 - 36.5 g/dL Final     RDW 03/27/2022 15.7 (A) 10.0 - 15.0 % Final     Platelet Count 03/27/2022 177  150 - 450 10e3/uL Final     % Neutrophils 03/27/2022 74  % Final     % Lymphocytes 03/27/2022 14  % Final     % Monocytes 03/27/2022 9  % Final     % Eosinophils 03/27/2022 2  % Final     % Basophils 03/27/2022 0  % Final     % Immature Granulocytes 03/27/2022 1  % Final     NRBCs per 100 WBC 03/27/2022 0  <1 /100 Final     Absolute Neutrophils 03/27/2022 7.6  1.6 - 8.3 10e3/uL Final     Absolute Lymphocytes 03/27/2022 1.4  0.8 - 5.3 10e3/uL Final     Absolute Monocytes 03/27/2022 0.9  0.0 - 1.3 10e3/uL Final      Absolute Eosinophils 03/27/2022 0.2  0.0 - 0.7 10e3/uL Final     Absolute Basophils 03/27/2022 0.0  0.0 - 0.2 10e3/uL Final     Absolute Immature Granulocytes 03/27/2022 0.1  <=0.4 10e3/uL Final     Absolute NRBCs 03/27/2022 0.0  10e3/uL Final     N terminal Pro BNP Inpatient 03/27/2022 154  0-1,800 pg/mL Final    Reference range shown and results flagged as abnormal are suggested inpatient cut points for confirming diagnosis if CHF in an acute setting. Establishing a baseline value for each individual patient is useful for follow-up. An inpatient or emergency department NT-proPBNP <300 pg/mL effectively rules out acute CHF, with 99% negative predictive value.    The outpatient non-acute reference range for ruling out CHF is:  0-125 pg/mL (age 18 to less than 75)  0-450 pg/mL (age 75 yrs and older)      Influenza A PCR 03/27/2022 Negative  Negative Final     Influenza B PCR 03/27/2022 Negative  Negative Final     RSV PCR 03/27/2022 Negative  Negative Final     SARS CoV2 PCR 03/27/2022 Negative  Negative Final    NEGATIVE: SARS-CoV-2 (COVID-19) RNA not detected, presumed negative.     Ventricular Rate 03/27/2022 71  BPM Corrected     Atrial Rate 03/27/2022 71  BPM Corrected     HI Interval 03/27/2022 202  ms Corrected     QRS Duration 03/27/2022 92  ms Corrected     QT 03/27/2022 448  ms Corrected     QTc 03/27/2022 486  ms Corrected     P Axis 03/27/2022 35  degrees Corrected     R AXIS 03/27/2022 42  degrees Corrected     T Axis 03/27/2022 27  degrees Corrected     Interpretation ECG 03/27/2022    Corrected                    Value:Sinus rhythm  ST & T wave abnormality, consider lateral ischemia  Prolonged QT  Abnormal ECG  When compared with ECG of 01-FEB-2020 16:49,  ST now depressed in Lateral leads  T wave inversion now evident in Lateral leads  Confirmed by GENERATED REPORT, COMPUTER ([id number]),  DESEAN MOON ([id number]) on 3/28/2022 8:27:06 AM  Also confirmed by GENERATED REPORT, COMPUTER ([id number]),  Nneka  Vickie ([id number])  on 3/28/2022 10:59:33 AM

## 2022-03-29 NOTE — Clinical Note
Please abstract the following data from this visit with this patient into the appropriate field in Epic:    Tests that can be patient reported without a hard copy:    Eye exam with ophthalmology on this date: 12/06/2021 [facility]

## 2022-03-30 ENCOUNTER — TRANSFERRED RECORDS (OUTPATIENT)
Dept: HEALTH INFORMATION MANAGEMENT | Facility: CLINIC | Age: 82
End: 2022-03-30
Payer: MEDICARE

## 2022-03-30 LAB
ANION GAP SERPL CALCULATED.3IONS-SCNC: 6 MMOL/L (ref 3–14)
BUN SERPL-MCNC: 41 MG/DL (ref 7–30)
CALCIUM SERPL-MCNC: 9.7 MG/DL (ref 8.5–10.1)
CHLORIDE BLD-SCNC: 99 MMOL/L (ref 94–109)
CO2 SERPL-SCNC: 31 MMOL/L (ref 20–32)
CREAT SERPL-MCNC: 1.39 MG/DL (ref 0.66–1.25)
GFR SERPL CREATININE-BSD FRML MDRD: 51 ML/MIN/1.73M2
GLUCOSE BLD-MCNC: 106 MG/DL (ref 70–99)
POTASSIUM BLD-SCNC: 4.7 MMOL/L (ref 3.4–5.3)
SODIUM SERPL-SCNC: 136 MMOL/L (ref 133–144)

## 2022-03-30 NOTE — TELEPHONE ENCOUNTER
"Appointments in Next Year    Apr 04, 2022 12:00 AM  CARDIAC DEVICE CHECK - REMOTE with SANDOVAL TECH1  [facility] ([facility] ) [phone]   Apr 07, 2022  8:30 AM  INR LAB with CS LAB  [facility] ([facility] ) [phone]   Jun 06, 2022  8:30 AM  LAB with CS LAB  [facility] ([facility] ) [phone]   Jun 09, 2022  9:30 AM  (Arrive by 9:15 AM)  Video Visit with Tiffany Suero NP  [facility] [place] ([facility] ) [phone]        Patient Contact    Attempt # 1    ED/Discharge Protocol    \"Hi, my name is Keisha Anderson RN, a registered nurse, and I am calling on behalf of Dr. Heath's office at [place].  I am calling to follow up and see how things are going for you after your recent visit.\"    Pt did hospital follow up visit already     Vascular clinic will send over notes to us today     Doing okay, he did see lab results from PCP already     Jessica Gutierrez RN  [facility]       "

## 2022-04-01 ENCOUNTER — TELEPHONE (OUTPATIENT)
Dept: CARDIOLOGY | Facility: CLINIC | Age: 82
End: 2022-04-01
Payer: MEDICARE

## 2022-04-01 DIAGNOSIS — I48.0 PAROXYSMAL A-FIB (H): ICD-10-CM

## 2022-04-01 DIAGNOSIS — R94.31 ABNORMAL ELECTROCARDIOGRAM: Primary | ICD-10-CM

## 2022-04-01 DIAGNOSIS — M25.511 CHRONIC PAIN OF BOTH SHOULDERS: ICD-10-CM

## 2022-04-01 DIAGNOSIS — G89.29 CHRONIC PAIN OF BOTH SHOULDERS: ICD-10-CM

## 2022-04-01 DIAGNOSIS — M25.512 CHRONIC PAIN OF BOTH SHOULDERS: ICD-10-CM

## 2022-04-01 NOTE — TELEPHONE ENCOUNTER
[facility]    Phone Message    May a detailed message be left on voicemail: yes     Reason for Call: Other: Pt called in stating that while he was in the ED for an infection they did an EKG and it was abnormal. He would like a c/b to discuss further     Action Taken: Message routed to:  Other: [facility]    Travel Screening: Not Applicable

## 2022-04-01 NOTE — TELEPHONE ENCOUNTER
[facility] Call [facility]    Phone Message    May a detailed message be left on voicemail: yes     Reason for Call: Medication Refill Request    Has the patient contacted the pharmacy for the refill? Yes   Name of medication being requested: traMADol (ULTRAM) 50 MG tablet   Provider who prescribed the medication: Sarah Duarte APRN CNP  Pharmacy: [id number] - [place], [place] - [address] AT [address] [place] [address] & [facility]  Date medication is needed: asap    Patient requesting meds to be filled.      Action Taken: Message routed to:  [facility] ([facility] Pain    Travel Screening: Not Applicable

## 2022-04-01 NOTE — TELEPHONE ENCOUNTER
"Reviewed EKG done at ED recently showing       Per ED noted dated 3/27/22, \"This patient presents with concern of edema of the lower limbs with a wound of the left lower extremity in addition to generalized malaise and fatigue.  Also experiencing upper respiratory tract congestion/infection symptoms for a little over a week for which he is taking Augmentin for the past 2 days.  As far as the edema and wound in his symptoms I considered congestive heart failure which she has a history of.  However testing is not consistent with that.  He has been compliant with his diuretics and he takes relatively high dose Lasix 40 mg twice a day in addition to spironolactone.  Given his renal impairment I was not inclined to increase the dosing of his diuretics with concern of worsening his renal function.  With respect to the wound it does not appear infected in my opinion and he lacks fever and his white blood cell count is normal.  He is anticoagulated on Coumadin and is therapeutic so I doubt deep vein thrombus.  I did not think he required ultrasound tonight.  Cardiac testing reassuring.  Covid and flu testing negative.  I think the patient can be discharged and he agrees.  I advised follow-up with the vascular clinic regarding his edema and the wound.  To return here in the meantime if any concerns or if worsening in general.\"    Pt states he has a cardiac workup done at [facility] in January with echo showing   1. Mildly enlarged left ventricular chamber size, no regional wall motion abnormalities, rcalculated 2-D biplane volumetric ejection fraction 67%.   2. Sigmoid ventricular septum with basal septal prominence: 17.5 mm. No evidence of dynamic left ventricular outflow tract obstruction.   3. Left ventricular strain assessment not performed because of image quality.   4. Borderline enlarged right ventricular chamber size, normal systolic function, estimated right ventricular systolic pressure 30 mmHg (systolic blood " pressure 126 mmHg).   5. Normal left ventricular filling pressure.   6. No  significant valvular heart disease.   7. Borderline enlarged inferior vena cava size with normal inspiratory collapse (>50%).   8. No  pericardial effusion.    Pt states he had shoulder surgery in January at [facility]. Pt denies chest pain or increased shortness of breath.   Will message Dr. Portillo to review. Jessica PRETTY

## 2022-04-01 NOTE — TELEPHONE ENCOUNTER
One cannot read anything on the lateral leads. Its all electrical artifact from V4 to V6. That needs to be repeated. Thx

## 2022-04-01 NOTE — TELEPHONE ENCOUNTER
Attempted to call pt with recommendations from Dr. Portillo, left message for pt to call back. Jessica PRETTY

## 2022-04-04 ENCOUNTER — ANCILLARY PROCEDURE (OUTPATIENT)
Dept: CARDIOLOGY | Facility: CLINIC | Age: 82
End: 2022-04-04
Attending: INTERNAL MEDICINE
Payer: MEDICARE

## 2022-04-04 DIAGNOSIS — Z95.0 CARDIAC PACEMAKER IN SITU: ICD-10-CM

## 2022-04-04 PROCEDURE — 93296 REM INTERROG EVL PM/IDS: CPT | Performed by: INTERNAL MEDICINE

## 2022-04-04 PROCEDURE — 93294 REM INTERROG EVL PM/LDLS PM: CPT | Performed by: INTERNAL MEDICINE

## 2022-04-04 NOTE — TELEPHONE ENCOUNTER
Attempted to call pt with recommendations from Dr. Portillo, left message for pt to call back. Recommendations also sent via My Chart with request to call back with questions or concerns. Jessica PRETTY

## 2022-04-04 NOTE — CONFIDENTIAL NOTE
Refill request    Medication: traMADol (ULTRAM) 50 MG tablet      Last clinic appointment: 2/14/22  Next clinic appointment: 4/18/22    Last Drug Screen Collected: 9/20/21  Controlled Substance Agreement signed: 9/20/21      Preferred pharmacy:      [id number] - [place], [place] - [address] AT [address] [place] [address]      Linda Serrano RN

## 2022-04-05 RX ORDER — TRAMADOL HYDROCHLORIDE 50 MG/1
50 TABLET ORAL EVERY 6 HOURS PRN
Qty: 60 TABLET | Refills: 0 | Status: SHIPPED | OUTPATIENT
Start: 2022-04-09 | End: 2022-05-03

## 2022-04-05 NOTE — TELEPHONE ENCOUNTER
Signed Prescriptions:                        Disp   Refills    traMADol (ULTRAM) 50 MG tablet             60 tab*0        Sig: Take 1 tablet (50 mg) by mouth every 6 hours as           needed for severe pain Max of 2 tabs/day.  Authorizing Provider: LAURA SANTIZO    Reviewed [place] Prescription Monitoring Program, appears appropriate.     VAMSI Zuluaga [facility]

## 2022-04-06 ENCOUNTER — TELEPHONE (OUTPATIENT)
Dept: CARDIOLOGY | Facility: CLINIC | Age: 82
End: 2022-04-06

## 2022-04-06 DIAGNOSIS — R94.31 ABNORMAL ELECTROCARDIOGRAM: ICD-10-CM

## 2022-04-06 DIAGNOSIS — I48.0 PAROXYSMAL A-FIB (H): ICD-10-CM

## 2022-04-06 PROCEDURE — 93000 ELECTROCARDIOGRAM COMPLETE: CPT | Performed by: INTERNAL MEDICINE

## 2022-04-06 NOTE — TELEPHONE ENCOUNTER
Repeat EKG done today. EKG  reviewed with Dr. Umer Dixon. Normal. Ok to go home. Continue to observe.

## 2022-04-07 ENCOUNTER — LAB (OUTPATIENT)
Dept: LAB | Facility: CLINIC | Age: 82
End: 2022-04-07
Payer: MEDICARE

## 2022-04-07 ENCOUNTER — ANTICOAGULATION THERAPY VISIT (OUTPATIENT)
Dept: ANTICOAGULATION | Facility: CLINIC | Age: 82
End: 2022-04-07

## 2022-04-07 DIAGNOSIS — I26.99 PE (PULMONARY THROMBOEMBOLISM) (H): ICD-10-CM

## 2022-04-07 DIAGNOSIS — I82.4Z9 DEEP VEIN THROMBOSIS (DVT) OF DISTAL VEIN OF LOWER EXTREMITY, UNSPECIFIED CHRONICITY, UNSPECIFIED LATERALITY (H): ICD-10-CM

## 2022-04-07 DIAGNOSIS — I48.0 PAROXYSMAL A-FIB (H): ICD-10-CM

## 2022-04-07 DIAGNOSIS — I26.99 PE (PULMONARY THROMBOEMBOLISM) (H): Primary | ICD-10-CM

## 2022-04-07 DIAGNOSIS — Z79.01 LONG TERM CURRENT USE OF ANTICOAGULANTS WITH INR GOAL OF 2.0-3.0: ICD-10-CM

## 2022-04-07 LAB — INR BLD: 2.7 (ref 0.9–1.1)

## 2022-04-07 PROCEDURE — 36416 COLLJ CAPILLARY BLOOD SPEC: CPT

## 2022-04-07 PROCEDURE — 85610 PROTHROMBIN TIME: CPT

## 2022-04-07 NOTE — PROGRESS NOTES
ANTICOAGULATION MANAGEMENT     Erich Craven 81 year old male is on warfarin with therapeutic INR result. (Goal INR 2.0-3.0)    Recent labs: (last 7 days)     04/07/22  0817   INR 2.7*       ASSESSMENT       Source(s): Chart Review    Previous INR was Therapeutic last 2(+) visits    Medication, diet, health changes since last INR chart reviewed; none identified     EKG negative, documented improved cold symptoms, finished abx, no other noted concerns at this time.           PLAN     Recommended plan for no diet, medication or health factor changes affecting INR     Dosing Instructions: continue your current warfarin dose with next INR in 4 weeks       Summary  As of 4/7/2022    Full warfarin instructions:  5 mg every day   Next INR check:  5/5/2022             Detailed voice message left for Jaun with dosing instructions and follow up date.     Contact [phone]  to schedule and with any changes, questions or concerns.     Education provided: Please call back if any changes to your diet, medications or how you've been taking warfarin and Contact [phone]  with any changes, questions or concerns.     Plan made per [facility] anticoagulation protocol      Paola Bee RN  Anticoagulation Clinic  4/7/2022    _______________________________________________________________________     Anticoagulation Episode Summary     Current INR goal:  2.0-3.0   TTR:  75.3 % (1 y)   Target end date:  Indefinite   Send INR reminders to:  [facility]    Indications    PE (pulmonary thromboembolism) (H) [I26.99]  Paroxysmal A-fib (H) [I48.0]  Deep vein thrombosis (DVT) of distal vein of lower extremity  unspecified chronicity  unspecified laterality (H) [I82.4Z9]  Long term current use of anticoagulants with INR goal of 2.0-3.0 [Z79.01]           Comments:           Anticoagulation Care Providers     Provider Role Specialty Phone number    Dontrell Gómez MD Referring Internal Medicine [phone]    Anne Heath MD  [facility] Internal Medicine [phone]

## 2022-04-15 NOTE — PROGRESS NOTES
Jaun is a 81 year old who is being evaluated via a billable video visit.      How would you like to obtain your AVS? MyChart  If the video visit is dropped, the invitation should be resent by: Text to cell phone: .  Will anyone else be joining your video visit? No      Video Start Time: 3:07 PM        [facility]     Date of visit: 4/18/2021      Assessment:   The patient is a 81-year-old male with past medical history significant for primary osteoarthritis of the both shoulders, chronic bilateral shoulder pain (right greater than the left), RIN, COPD, obesity with BMI 39, hypertension, paroxysmal A. fib status post pacemaker and DVT.       Visit Diagnoses:  1. Primary osteoarthritis of shoulders, bilateral        Plan:    Therapies:   1. Physical Therapy: I encouraged Jaun continue regular physical activity as able. Restart physical therapy post operatively.   2. Clinical Health Psychologist to address issues of relaxation, behavioral change, coping style, and other factors important to improvement: not at this time  Medication Management:   1. Jaun reports excellent pain benefit with Tramadol and notes this medication allows him to be more active. He has severe pain with end stage osteoarthritis. For these reasons it is reasonable to continue Tramadol 50mg BID as needed for severe pain. He takes 1.5-2 tabs/day, okay to continue at this dose.   Further procedures recommended:   3. I advised he continue to monitor benefit from bilateral suprascapular nerve blocks. Unfortunately we will not be able to repeat prior to surgery as he cannot have within 3 months or surgery- scheduled in July.     Follow up: in 12 weeks- virtual visit is fine.     Krupa AGUSTIN CNP  [facility]     -------------------------------------------------    Subjective:    Chief complaint:   Chief Complaint   Patient presents with     RECHECK     [facility] RETURN VIDEO, patient reports 2/10 pain in  shoulders       Interval history:  Erich Craven is a 81 year old male last seen on 2/14/2022.  He is a patient of Dr. Allen seen in follow up.     Recommendations/plan at the last visit included:  Therapies:   4. Physical Therapy: continue regular physical activity as able. Restart physical therapy post operatively.   5. Clinical Health Psychologist to address issues of relaxation, behavioral change, coping style, and other factors important to improvement: not at this time  Medication Management:   1. Jaun reports excellent pain benefit with Tramadol and notes this medication allows him to be more active. He has severe pain with end stage osteoarthritis. For these reasons it is reasonable to continue Tramadol 50mg BID as needed for severe pain.  Further procedures recommended:   6. Jaun reports more substantial and longer lasting pain benefit with recent injections with [facility], still has some good pain relief. He would like to repeat this injection. Repeat bilateral glenohumeral joint injections ordered today. They will call to schedule. He understands he needs to have at least 3 months prior to surgery and will update surgeon about this.     Follow up: in 8-12 weeks- virtual visit is fine.       Since his last visit, Erich Craven reports:  -His pain is better than it was at last visit.   -He had bilateral suprascapular nerve blocks with Dr. Allen on 3/10/2022. This was going to be glenohumeral injections but Dr. Allen recommended the suprascapular blocks given previous success. He reports about 75% pain relief with these injections, continues to have some benefit from these injections.  -He continues Tramadol 50mg 1.5-2 tablets a day with good pain benefit and increased physical activity. He takes a full 2 tablets on more severe pain days.   -He is going to have a shoulder replacement with Dr. Jeffries with [facility]. He is somewhat apprehensive about this. He will have one injection and then the  other side 6 weeks later.   -He moved into a senior care living apartment, has more support there and thinks it will be easier to recover from surgery.     HPI per Dr. Allen:  The patient is a 81-year-old male with past medical history significant for primary osteoarthritis of the both shoulders, chronic bilateral shoulder pain (right greater than the left), RIN, COPD, obesity with BMI 39, hypertension, paroxysmal A. fib status post pacemaker and DVT.  His pain located in the anterior aspect bilateral shoulder and occasionally pain radiating to bilateral upper arms and to elbows.  Pain increases with reaching overhead, reaching behind the back, putting on jacket forgetting and lifting.  He is unable to lift more than 90 degrees.  He notes that pain gradually gets worse throughout the day.  He has been taking Tylenol 3 with good pain relief.  He states that he takes only at night.  He was scheduled for the shoulder replacement surgery however it was canceled due to his COPD exacerbation and upper respiratory infection.  He is still planning on right shoulder surgery.  Date is not confirmed yet.     He came to us for better pain control.  In the past he had performed multiple shoulder injections with good pain relief.  He is interested in trying them again.  He also requests alternative to Tylenol 3.     I have reviewed his x-ray of the shoulder.  It shows severe degenerative joint disease in both shoulder.  No visible erosion of the glenoid in the right shoulder is noted.     Pain Information:   Pain rating: averages 5/10 on a 0-10 scale.     Current pain medications:    Tylenol 1000mg QID- somewhat helpful, not as good as Tramadol    Tramadol 50mg BID- helpful   Zoloft 50mg daily   Hemp CBD gel- [facility]   Voltaren gel QID PRN- [facility]    Current MME: 0    THE 4 A's OF OPIOID MAINTENANCE ANALGESIA    Analgesia: very good at low dose    Activity: improved    Adverse effects: denies    Adherence to Rx protocol: no  concerns    [place] Board of Pharmacy Data Base Reviewed:    YES;     Review of [place] Prescription Monitoring Program (): No concern for abuse or misuse of controlled medications based on this report.     Annual Controlled Substance Agreement/UDS due date: September 2022    Past pain treatments:  Bilateral suprascapular nerve blocks with Dr. Allen on 3/10/2022- 75%- pain relief  Bilateral glenohumeral steroid injections with [facility] on 1/11/2022- 50-60% pain relkief  repeat bilateral suprascapular nerve blocks with Dr. Aleln on 9/23/2021- excellent pain benefit for 2 weeks but wearing off fairly quickly afterwards  Bilateral suprascapular nerve blocks with Dr. Allen on 8/26/2021- very good pain benefit with the procedure but less than 2 weeks      Medications:  Current Outpatient Medications   Medication Sig Dispense Refill     acetaminophen (TYLENOL) 500 MG tablet Take 1,000 mg by mouth 4 times daily       amoxicillin-clavulanate (AUGMENTIN) 875-125 MG tablet Take 1 tablet by mouth 2 times daily 10 tablet 0     aspirin (ASA) 81 MG chewable tablet Take 1 tablet by mouth daily       atorvastatin (LIPITOR) 40 MG tablet TAKE 1 TABLET BY MOUTH EVERY EVENING 90 tablet 2     carvedilol (COREG) 12.5 MG tablet TAKE 1 TABLET BY MOUTH TWICE A  tablet 2     co-enzyme Q-10 100 MG CAPS capsule Take 100 mg by mouth daily       diltiazem ER COATED BEADS (CARDIZEM CD/CARTIA XT) 300 MG 24 hr capsule TAKE 1 CAPSULE BY MOUTH EVERY DAY 90 capsule 3     Fluticasone-Umeclidin-Vilanterol (TRELEGY ELLIPTA) 200-62.5-25 MCG/INH oral inhaler Inhale 1 puff into the lungs daily       furosemide (LASIX) 20 MG tablet Take 2 tabs in morning and 1 tab in the sfternoon 360 tablet 3     levothyroxine (SYNTHROID/LEVOTHROID) 175 MCG tablet Take 1 tablet (175 mcg) by mouth daily Additional 1/2 tablet on Sundays 90 tablet 2     lisinopril (ZESTRIL) 20 MG tablet Take 1 tablet (20 mg) by mouth daily 90 tablet 3     metolazone  (ZAROXOLYN) 2.5 MG tablet Take 1 tablet (2.5 mg) by mouth 2 times daily 180 tablet 3     Multiple vitamin TABS Take 1 tablet by mouth daily       order for DME Oxygen 2 Li/min  Continuous. At night bled into PAP device. In the daytime, NC with bubbler: requiring portability 1 Device 0     spironolactone (ALDACTONE) 25 MG tablet Take 1 tablet (25 mg) by mouth every other day 45 tablet 2     tamsulosin (FLOMAX) 0.4 MG capsule Take 1 capsule (0.4 mg) by mouth daily 90 capsule 1     traMADol (ULTRAM) 50 MG tablet Take 1 tablet (50 mg) by mouth every 6 hours as needed for severe pain Max of 2 tabs/day. 60 tablet 0     venlafaxine (EFFEXOR) 37.5 MG tablet TAKE 1 TABLET BY MOUTH TWICE DAILY. 180 tablet 0     warfarin ANTICOAGULANT (COUMADIN) 5 MG tablet Take 1.5 tablets (7.5 mg) on Fridays and take 1 tablet (5 mg) all other days or as directed by the INR clinic 100 tablet 1       Medical History: any changes in medical history since they were last seen? No      Objective:    Physical Exam:  There were no vitals taken for this visit.  Constitutional: Well developed, well nourished, appears stated age. Obese.  HEENT: Head atraumatic, normocephalic. Eyes without conjunctival injection or jaundice. Oropharynx clear. Neck supple. No obvious neck masses.  Skin: No rash, lesions, or petechiae of exposed skin.   Psychiatric/mental status: Alert, without lethargy or stupor. Speech fluent. Appropriate affect. Mood normal. Able to follow commands without difficulty.     Imaging:  X-ray of bilateral shoulders was completed on 2/26/2021 and shows:  Impression: Bilateral shoulder x-rays demonstrate severe degenerative joint disease of both shoulders.  Noticeable erosion of the glenoid in the right shoulder is noted.  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  No significant proximal migration of the humeral head is noted bilaterally.  Otherwise, no acute fractures or other osseous pathology noted.      BILLING TIME DOCUMENTATION:   The  total TIME spent on this patient on the date of the encounter/appointment was 17 minutes.      TOTAL TIME includes:   Time spent preparing to see the patient (reviewing records and tests)   Time spent face to face (or over the phone) with the patient   Time spent ordering tests, medications, procedures and referrals   Time spent Referring and communicating with other healthcare professionals   Time spent documenting clinical information in Epic     Video-Visit Details    Type of service:  Video Visit    Video End Time:3:19PM    Originating Location (pt. Location): Home    Distant Location (provider location):  [facility] [place]     Platform used for Video Visit: [employer]

## 2022-04-18 ENCOUNTER — TELEPHONE (OUTPATIENT)
Dept: INFECTIOUS DISEASES | Facility: CLINIC | Age: 82
End: 2022-04-18

## 2022-04-18 ENCOUNTER — VIRTUAL VISIT (OUTPATIENT)
Dept: ANESTHESIOLOGY | Facility: CLINIC | Age: 82
End: 2022-04-18
Payer: MEDICARE

## 2022-04-18 ENCOUNTER — MYC MEDICAL ADVICE (OUTPATIENT)
Dept: FAMILY MEDICINE | Facility: CLINIC | Age: 82
End: 2022-04-18

## 2022-04-18 DIAGNOSIS — M19.011 PRIMARY OSTEOARTHRITIS OF SHOULDERS, BILATERAL: Primary | ICD-10-CM

## 2022-04-18 DIAGNOSIS — M19.012 PRIMARY OSTEOARTHRITIS OF SHOULDERS, BILATERAL: Primary | ICD-10-CM

## 2022-04-18 PROCEDURE — 99213 OFFICE O/P EST LOW 20 MIN: CPT | Mod: 95 | Performed by: NURSE PRACTITIONER

## 2022-04-18 NOTE — TELEPHONE ENCOUNTER
LVM for pt about scheduling 3 month f/u with Dr. Duarte. Provided clinic scheduling number to schedule.

## 2022-04-18 NOTE — PATIENT INSTRUCTIONS
Therapies:   Physical Therapy: continue regular physical activity as able. Restart physical therapy post operatively.   Clinical Health Psychologist to address issues of relaxation, behavioral change, coping style, and other factors important to improvement: not at this time  Medication Management:   Okay to continue Tramadol 50mg twice daily as needed for severe pain.  Further procedures recommended:   Continue to monitor benefit from bilateral suprascapular nerve blocks.    Follow up: in 12 weeks- virtual visit is fine.

## 2022-04-18 NOTE — LETTER
4/18/2022       RE: Erich Craven  [address]  [facility] [address]     Dear Colleague,    Thank you for referring your patient, Erich Craven, to the [facility]IVE [facility]NAGEMENT [place] at [facility]. Please see a copy of my visit note below.    Jaun is a 81 year old who is being evaluated via a billable video visit.      How would you like to obtain your AVS? MyChart  If the video visit is dropped, the invitation should be resent by: Text to cell phone: .  Will anyone else be joining your video visit? No      Video Start Time: 3:07 PM        [facility]     Date of visit: 4/18/2021      Assessment:   The patient is a 81-year-old male with past medical history significant for primary osteoarthritis of the both shoulders, chronic bilateral shoulder pain (right greater than the left), RIN, COPD, obesity with BMI 39, hypertension, paroxysmal A. fib status post pacemaker and DVT.       Visit Diagnoses:  1. Primary osteoarthritis of shoulders, bilateral        Plan:    Therapies:   1. Physical Therapy: I encouraged Jaun continue regular physical activity as able. Restart physical therapy post operatively.   2. Clinical Health Psychologist to address issues of relaxation, behavioral change, coping style, and other factors important to improvement: not at this time  Medication Management:   1. Jaun reports excellent pain benefit with Tramadol and notes this medication allows him to be more active. He has severe pain with end stage osteoarthritis. For these reasons it is reasonable to continue Tramadol 50mg BID as needed for severe pain. He takes 1.5-2 tabs/day, okay to continue at this dose.   Further procedures recommended:   3. I advised he continue to monitor benefit from bilateral suprascapular nerve blocks. Unfortunately we will not be able to repeat prior to surgery as he cannot have within 3  months or surgery- scheduled in July.     Follow up: in 12 weeks- virtual visit is fine.     Krupa Duarte APRUNRULY [facility] Pain Management     -------------------------------------------------    Subjective:    Chief complaint:   Chief Complaint   Patient presents with     RECHECK     [facility] RETURN VIDEO, patient reports 2/10 pain in shoulders       Interval history:  Erich Craven is a 81 year old male last seen on 2/14/2022.  He is a patient of Dr. Allen seen in follow up.     Recommendations/plan at the last visit included:  Therapies:   4. Physical Therapy: continue regular physical activity as able. Restart physical therapy post operatively.   5. Clinical Health Psychologist to address issues of relaxation, behavioral change, coping style, and other factors important to improvement: not at this time  Medication Management:   1. Jaun reports excellent pain benefit with Tramadol and notes this medication allows him to be more active. He has severe pain with end stage osteoarthritis. For these reasons it is reasonable to continue Tramadol 50mg BID as needed for severe pain.  Further procedures recommended:   6. Jaun reports more substantial and longer lasting pain benefit with recent injections with [facility], still has some good pain relief. He would like to repeat this injection. Repeat bilateral glenohumeral joint injections ordered today. They will call to schedule. He understands he needs to have at least 3 months prior to surgery and will update surgeon about this.     Follow up: in 8-12 weeks- virtual visit is fine.       Since his last visit, Erich Craven reports:  -His pain is better than it was at last visit.   -He had bilateral suprascapular nerve blocks with Dr. Allen on 3/10/2022. This was going to be glenohumeral injections but Dr. Allen recommended the suprascapular blocks given previous success. He reports about 75% pain relief with these injections, continues to have some benefit  from these injections.  -He continues Tramadol 50mg 1.5-2 tablets a day with good pain benefit and increased physical activity. He takes a full 2 tablets on more severe pain days.   -He is going to have a shoulder replacement with Dr. Jeffries with [facility]. He is somewhat apprehensive about this. He will have one injection and then the other side 6 weeks later.   -He moved into a senior care living apartment, has more support there and thinks it will be easier to recover from surgery.     HPI per Dr. Allen:  The patient is a 81-year-old male with past medical history significant for primary osteoarthritis of the both shoulders, chronic bilateral shoulder pain (right greater than the left), RIN, COPD, obesity with BMI 39, hypertension, paroxysmal A. fib status post pacemaker and DVT.  His pain located in the anterior aspect bilateral shoulder and occasionally pain radiating to bilateral upper arms and to elbows.  Pain increases with reaching overhead, reaching behind the back, putting on jacket forgetting and lifting.  He is unable to lift more than 90 degrees.  He notes that pain gradually gets worse throughout the day.  He has been taking Tylenol 3 with good pain relief.  He states that he takes only at night.  He was scheduled for the shoulder replacement surgery however it was canceled due to his COPD exacerbation and upper respiratory infection.  He is still planning on right shoulder surgery.  Date is not confirmed yet.     He came to us for better pain control.  In the past he had performed multiple shoulder injections with good pain relief.  He is interested in trying them again.  He also requests alternative to Tylenol 3.     I have reviewed his x-ray of the shoulder.  It shows severe degenerative joint disease in both shoulder.  No visible erosion of the glenoid in the right shoulder is noted.     Pain Information:   Pain rating: averages 5/10 on a 0-10 scale.     Current pain medications:    Tylenol  1000mg QID- somewhat helpful, not as good as Tramadol    Tramadol 50mg BID- helpful   Zoloft 50mg daily   Hemp CBD gel- [facility]   Voltaren gel QID PRN- [facility]    Current MME: 0    THE 4 A's OF OPIOID MAINTENANCE ANALGESIA    Analgesia: very good at low dose    Activity: improved    Adverse effects: denies    Adherence to Rx protocol: no concerns    [place] Board of Pharmacy Data Base Reviewed:    YES;     Review of [place] Prescription Monitoring Program (): No concern for abuse or misuse of controlled medications based on this report.     Annual Controlled Substance Agreement/UDS due date: September 2022    Past pain treatments:  Bilateral suprascapular nerve blocks with Dr. Allen on 3/10/2022- 75%- pain relief  Bilateral glenohumeral steroid injections with [facility] on 1/11/2022- 50-60% pain relkief  repeat bilateral suprascapular nerve blocks with Dr. Allen on 9/23/2021- excellent pain benefit for 2 weeks but wearing off fairly quickly afterwards  Bilateral suprascapular nerve blocks with Dr. Allen on 8/26/2021- very good pain benefit with the procedure but less than 2 weeks      Medications:  Current Outpatient Medications   Medication Sig Dispense Refill     acetaminophen (TYLENOL) 500 MG tablet Take 1,000 mg by mouth 4 times daily       amoxicillin-clavulanate (AUGMENTIN) 875-125 MG tablet Take 1 tablet by mouth 2 times daily 10 tablet 0     aspirin (ASA) 81 MG chewable tablet Take 1 tablet by mouth daily       atorvastatin (LIPITOR) 40 MG tablet TAKE 1 TABLET BY MOUTH EVERY EVENING 90 tablet 2     carvedilol (COREG) 12.5 MG tablet TAKE 1 TABLET BY MOUTH TWICE A  tablet 2     co-enzyme Q-10 100 MG CAPS capsule Take 100 mg by mouth daily       diltiazem ER COATED BEADS (CARDIZEM CD/CARTIA XT) 300 MG 24 hr capsule TAKE 1 CAPSULE BY MOUTH EVERY DAY 90 capsule 3     Fluticasone-Umeclidin-Vilanterol (TRELEGY ELLIPTA) 200-62.5-25 MCG/INH oral inhaler Inhale 1 puff into the lungs daily       furosemide  (LASIX) 20 MG tablet Take 2 tabs in morning and 1 tab in the sfternoon 360 tablet 3     levothyroxine (SYNTHROID/LEVOTHROID) 175 MCG tablet Take 1 tablet (175 mcg) by mouth daily Additional 1/2 tablet on Sundays 90 tablet 2     lisinopril (ZESTRIL) 20 MG tablet Take 1 tablet (20 mg) by mouth daily 90 tablet 3     metolazone (ZAROXOLYN) 2.5 MG tablet Take 1 tablet (2.5 mg) by mouth 2 times daily 180 tablet 3     Multiple vitamin TABS Take 1 tablet by mouth daily       order for DME Oxygen 2 Li/min  Continuous. At night bled into PAP device. In the daytime, NC with bubbler: requiring portability 1 Device 0     spironolactone (ALDACTONE) 25 MG tablet Take 1 tablet (25 mg) by mouth every other day 45 tablet 2     tamsulosin (FLOMAX) 0.4 MG capsule Take 1 capsule (0.4 mg) by mouth daily 90 capsule 1     traMADol (ULTRAM) 50 MG tablet Take 1 tablet (50 mg) by mouth every 6 hours as needed for severe pain Max of 2 tabs/day. 60 tablet 0     venlafaxine (EFFEXOR) 37.5 MG tablet TAKE 1 TABLET BY MOUTH TWICE DAILY. 180 tablet 0     warfarin ANTICOAGULANT (COUMADIN) 5 MG tablet Take 1.5 tablets (7.5 mg) on Fridays and take 1 tablet (5 mg) all other days or as directed by the INR clinic 100 tablet 1       Medical History: any changes in medical history since they were last seen? No      Objective:    Physical Exam:  There were no vitals taken for this visit.  Constitutional: Well developed, well nourished, appears stated age. Obese.  HEENT: Head atraumatic, normocephalic. Eyes without conjunctival injection or jaundice. Oropharynx clear. Neck supple. No obvious neck masses.  Skin: No rash, lesions, or petechiae of exposed skin.   Psychiatric/mental status: Alert, without lethargy or stupor. Speech fluent. Appropriate affect. Mood normal. Able to follow commands without difficulty.     Imaging:  X-ray of bilateral shoulders was completed on 2/26/2021 and shows:  Impression: Bilateral shoulder x-rays demonstrate severe  degenerative joint disease of both shoulders.  Noticeable erosion of the glenoid in the right shoulder is noted.  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  No significant proximal migration of the humeral head is noted bilaterally.  Otherwise, no acute fractures or other osseous pathology noted.      BILLING TIME DOCUMENTATION:   The total TIME spent on this patient on the date of the encounter/appointment was 17 minutes.      TOTAL TIME includes:   Time spent preparing to see the patient (reviewing records and tests)   Time spent face to face (or over the phone) with the patient   Time spent ordering tests, medications, procedures and referrals   Time spent Referring and communicating with other healthcare professionals   Time spent documenting clinical information in Epic       Sincerely,    VAMSI Sparrow CNP

## 2022-04-18 NOTE — NURSING NOTE
Patient presents with:  RECHECK: [facility] RETURN VIDEO, patient reports 2/10 pain in shoulders      Data Unavailable     Pain Medications     Analgesics Other Refills Start End     acetaminophen (TYLENOL) 500 MG tablet          Sig - Route: Take 1,000 mg by mouth 4 times daily - Oral    Class: Historical    Salicylates Refills Start End     aspirin (ASA) 81 MG chewable tablet     11/27/2015     Sig - Route: Take 1 tablet by mouth daily - Oral    Class: Historical    Opioid Agonists Refills Start End     traMADol (ULTRAM) 50 MG tablet    0 4/9/2022     Sig - Route: Take 1 tablet (50 mg) by mouth every 6 hours as needed for severe pain Max of 2 tabs/day. - Oral    Class: E-Prescribe          What medications are you using for pain? Tylenol, aspirin, tramadol    (Return Patients only) What refills are you needing today? Maybe tramadol      Karla Lantigua, EMT

## 2022-04-20 NOTE — TELEPHONE ENCOUNTER
Triage Patient Outreach    Attempt # 1    Was call answered?  No.  Left message on voicemail with information to call back to triage    When patient calls back: transfer to triage       Martina Ackerman, RN  [facility] RN Triage Team

## 2022-04-21 ENCOUNTER — OFFICE VISIT (OUTPATIENT)
Dept: URGENT CARE | Facility: URGENT CARE | Age: 82
End: 2022-04-21
Payer: MEDICARE

## 2022-04-21 ENCOUNTER — NURSE TRIAGE (OUTPATIENT)
Dept: FAMILY MEDICINE | Facility: CLINIC | Age: 82
End: 2022-04-21
Payer: MEDICARE

## 2022-04-21 VITALS
DIASTOLIC BLOOD PRESSURE: 75 MMHG | TEMPERATURE: 97 F | HEART RATE: 95 BPM | OXYGEN SATURATION: 94 % | SYSTOLIC BLOOD PRESSURE: 121 MMHG

## 2022-04-21 DIAGNOSIS — I26.99 PE (PULMONARY THROMBOEMBOLISM) (H): ICD-10-CM

## 2022-04-21 DIAGNOSIS — J44.9 CHRONIC OBSTRUCTIVE PULMONARY DISEASE, UNSPECIFIED COPD TYPE (H): ICD-10-CM

## 2022-04-21 DIAGNOSIS — E11.9 TYPE 2 DIABETES MELLITUS WITHOUT COMPLICATION, WITHOUT LONG-TERM CURRENT USE OF INSULIN (H): ICD-10-CM

## 2022-04-21 DIAGNOSIS — E66.01 MORBID OBESITY (H): ICD-10-CM

## 2022-04-21 DIAGNOSIS — I25.119 CORONARY ARTERY DISEASE INVOLVING NATIVE CORONARY ARTERY WITH ANGINA PECTORIS, UNSPECIFIED WHETHER NATIVE OR TRANSPLANTED HEART (H): ICD-10-CM

## 2022-04-21 DIAGNOSIS — I48.0 PAROXYSMAL A-FIB (H): ICD-10-CM

## 2022-04-21 DIAGNOSIS — D32.0 BENIGN NEOPLASM OF CEREBRAL MENINGES (H): Primary | ICD-10-CM

## 2022-04-21 DIAGNOSIS — R60.9 PITTING EDEMA: ICD-10-CM

## 2022-04-21 PROCEDURE — 99213 OFFICE O/P EST LOW 20 MIN: CPT | Performed by: PHYSICIAN ASSISTANT

## 2022-04-21 ASSESSMENT — ENCOUNTER SYMPTOMS
PALPITATIONS: 0
SHORTNESS OF BREATH: 0

## 2022-04-21 NOTE — TELEPHONE ENCOUNTER
I am covering for   Agree with you   Schedule appointment with team   Ok to see team tomorrow or go to    Dr.Nasima Jerry MD

## 2022-04-21 NOTE — TELEPHONE ENCOUNTER
"Nurse Triage SBAR    Is this a 2nd Level Triage? YES, LICENSED PRACTITIONER REVIEW IS REQUIRED    Situation:   Patient sent mychart:   Health issues  4/18/2022 9:44 AM Reply    To: CS TRIAGE IM      From: Jaun Craven      Created: 4/18/2022 9:44 AM        *-*-*This message was handled on 4/20/2022 4:34 PM by LUIS MANUEL WHITE*-*-*    Dear Dr. Heath,                           Please give me advice.  I have rising pulse rates from the mid to high 70s to the high 90s by afternoon and evening.  The pulse rates usually include severe fatigue and chills and no appetite with  continued weight loss.  The oxygen levels run from high 80s to mid 90s - no temperature change from normal and same with blood pressure.   Should I be experiencing other physical signs or symptoms or is this an indication of anxiety.   Thanks,  Jaun Craven            Background: Patient reports taking diuretic. Patient reports increased anxiety due to recent move.     Assessment:   Today: 95% on oxygen and 95 HR.  Denies difficulty breathing, chest pain, dizziness, fever, weakness, diarrhea vomiting bleeding  When oxygen goes down and heart rate goes high I get fatigue.   Denies being on oxygen, uses CPAP at night.   Feeling anxious with heart rate.   130/80-90s.   O2 level is lowest at 85%- not sustained, after deep breaths goes up to 90%. Its been going on and off for last week or so, kind of up and down. \"basically feeling lightheaded and fatigue\" \"I never feel like im fainting\"   Staying hydrated- but on diarrheatics      Protocol Recommended Disposition:   See Within 3 Days In Office - writer did review that if symptoms comeback to go to ER.    Recommendation: Disposition team only has sameday or VV the rest of the week.   Routed to provider    Does the patient meet one of the following criteria for ADS visit consideration? 16+ years old, with an [facility] PCP     TIP  Providers, please consider if this condition is appropriate for management at one of " our Acute and Diagnostic Services sites.     If patient is a good candidate, please use dotphrase <dot>triageresponse and select Refer to ADS to document.      Callback: [phone] ok leave detailed VM    Georgiana Jennings RN  [facility]    Reason for Disposition    MILD weakness (i.e., does not interfere with ability to work, go to school, normal activities) and persists > 1 week    Additional Information    Negative: Severe difficulty breathing (e.g., struggling for each breath, speaks in single words)    Negative: Shock suspected (e.g., cold/pale/clammy skin, too weak to stand, low BP, rapid pulse)    Negative: Difficult to awaken or acting confused (e.g., disoriented, slurred speech)    Negative: Fainted > 15 minutes ago and still feels too weak or dizzy to stand    Negative: SEVERE weakness (i.e., unable to walk or barely able to walk, requires support) and new onset or worsening    Negative: Sounds like a life-threatening emergency to the triager    Negative: Weakness of the face, arm or leg on one side of the body    Negative: Has diabetes and weakness from low blood sugar (i.e., < 60 mg/dL or 3.5 mmol/L)    Negative: Recent heat exposure, suspected cause of weakness    Negative: Vomiting is the main symptom    Negative: Diarrhea is the main symptom    Negative: Difficulty breathing    Negative: Heart beating < 50 beats per minute OR > 140 beats per minute    Negative: Extra heartbeats OR irregular heart beating (i.e., 'palpitations')    Negative: Follows bleeding (e.g., from vomiting, rectum, vagina) (Exception: small transient weakness from sight of a small amount blood)    Negative: Bloody, black, or tarry bowel movements (Exception: chronic-unchanged  black-grey bowel movements and is taking iron pills or Pepto-bismol)    Negative: MODERATE weakness from poor fluid intake with no improvement after 2 hours of rest and fluids    Negative: Drinking very little and dehydration suspected  (e.g., no urine > 12 hours, very dry mouth, very lightheaded)    Negative: Patient sounds very sick or weak to the triager    Negative: MODERATE weakness (i.e., interferes with work, school, normal activities) and cause unknown (Exceptions: weakness with acute minor illness, or weakness from poor fluid intake)    Negative: Fever > 103 F (39.4 C) and not able to get the Fever down using CARE ADVICE    Negative: Fever > 100.0 F (37.8 C) and bedridden (e.g., nursing home patient, stroke, chronic illness, recovering from surgery)    Negative: Fever > 101 F (38.3 C) and over 60 years of age    Negative: Fever > 100.0 F (37.8 C) and diabetes mellitus or weak immune system (e.g., HIV positive, cancer chemo, splenectomy, organ transplant, chronic steroids)    Negative: Pale skin (pallor)    Negative: MODERATE weakness (i.e., interferes with work, school, normal activities) and persists > 3 days    Negative: Taking a medicine that could cause weakness (e.g., blood pressure medications, diuretics)    Negative: Patient wants to be seen    Protocols used: WEAKNESS (GENERALIZED) AND FATIGUE-A-OH

## 2022-04-21 NOTE — TELEPHONE ENCOUNTER
Patient Contact     Attempt #: 2     Was call answered? Yes- see triage encounter 4/21/22     Georgiana Jennings RN  [facility]

## 2022-04-21 NOTE — TELEPHONE ENCOUNTER
Per Andreina WONG - -LVM to go to [facility] or call and schedule OV with Team tomorrow

## 2022-04-21 NOTE — PROGRESS NOTES
SUBJECTIVE:   Erich Craven is a 81 year old male presenting with a chief complaint of   Chief Complaint   Patient presents with     Urgent Care     Low pulse and o2       He is an established patient of [place].  Patient presents with complaints of low O2 and high pulse x 10 days.  Called PCP and told to come  In.  HR has been in 90's.  Oxygenation going into 80's per patient. It occurs while sitting.  Patient has been checking with pulse ox for about a year and per patient this is low.  No appointment with PCP.  Patient states more leg edema than usual.  Patient recently treated for leg infection.  Patient was on augmentin - finished about a week ago.  Patient states he has an upcoming surgery and feels anxious over surgery.          Review of Systems   Respiratory: Negative for shortness of breath.    Cardiovascular: Positive for leg swelling. Negative for chest pain and palpitations.   All other systems reviewed and are negative.      Past Medical History:   Diagnosis Date     (HFpEF) heart failure with preserved ejection fraction (H)      Arthritis      BPH with urinary obstruction      CAD (coronary artery disease)     PCI w/ [facility] in 2014     Cardiac pacemaker in situ      Chronic diastolic heart failure (H)      Complication of anesthesia      COPD (chronic obstructive pulmonary disease) (H)      Diabetes (H)      DVT (deep venous thrombosis) (H)      Dyspnea      Essential hypertension      Fatigue      HLD (hyperlipidemia)      Hx of long term use of blood thinners     Due to PE. Started in 2012     Hypothyroidism      Lymphedema      Mumps      Obesity, Class III, BMI 40-49.9 (morbid obesity) (H)      RIN (obstructive sleep apnea)     has refused CPAP     Pacemaker      Paroxysmal A-fib (H)      PE (pulmonary thromboembolism) (H)      Family History   Problem Relation Age of Onset     Hypertension Father      Current Outpatient Medications   Medication Sig Dispense Refill     acetaminophen (TYLENOL) 500  MG tablet Take 1,000 mg by mouth 4 times daily       amoxicillin-clavulanate (AUGMENTIN) 875-125 MG tablet Take 1 tablet by mouth 2 times daily 10 tablet 0     aspirin (ASA) 81 MG chewable tablet Take 1 tablet by mouth daily       atorvastatin (LIPITOR) 40 MG tablet TAKE 1 TABLET BY MOUTH EVERY EVENING 90 tablet 2     carvedilol (COREG) 12.5 MG tablet TAKE 1 TABLET BY MOUTH TWICE A  tablet 2     co-enzyme Q-10 100 MG CAPS capsule Take 100 mg by mouth daily       diltiazem ER COATED BEADS (CARDIZEM CD/CARTIA XT) 300 MG 24 hr capsule TAKE 1 CAPSULE BY MOUTH EVERY DAY 90 capsule 3     Fluticasone-Umeclidin-Vilanterol (TRELEGY ELLIPTA) 200-62.5-25 MCG/INH oral inhaler Inhale 1 puff into the lungs daily       furosemide (LASIX) 20 MG tablet Take 2 tabs in morning and 1 tab in the sfternoon 360 tablet 3     levothyroxine (SYNTHROID/LEVOTHROID) 175 MCG tablet Take 1 tablet (175 mcg) by mouth daily Additional 1/2 tablet on Sundays 90 tablet 2     lisinopril (ZESTRIL) 20 MG tablet Take 1 tablet (20 mg) by mouth daily 90 tablet 3     metolazone (ZAROXOLYN) 2.5 MG tablet Take 1 tablet (2.5 mg) by mouth 2 times daily 180 tablet 3     Multiple vitamin TABS Take 1 tablet by mouth daily       order for DME Oxygen 2 Li/min  Continuous. At night bled into PAP device. In the daytime, NC with bubbler: requiring portability 1 Device 0     spironolactone (ALDACTONE) 25 MG tablet Take 1 tablet (25 mg) by mouth every other day 45 tablet 2     tamsulosin (FLOMAX) 0.4 MG capsule Take 1 capsule (0.4 mg) by mouth daily 90 capsule 1     traMADol (ULTRAM) 50 MG tablet Take 1 tablet (50 mg) by mouth every 6 hours as needed for severe pain Max of 2 tabs/day. 60 tablet 0     venlafaxine (EFFEXOR) 37.5 MG tablet TAKE 1 TABLET BY MOUTH TWICE DAILY. 180 tablet 0     warfarin ANTICOAGULANT (COUMADIN) 5 MG tablet Take 1.5 tablets (7.5 mg) on Fridays and take 1 tablet (5 mg) all other days or as directed by the INR clinic 100 tablet 1     Social  History     Tobacco Use     Smoking status: Former Smoker     Packs/day: 0.50     Years: 25.00     Pack years: 12.50     Smokeless tobacco: Never Used     Tobacco comment: quit in 1989   Substance Use Topics     Alcohol use: 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; recovering       OBJECTIVE  /75 (BP Location: Right arm, Patient Position: Sitting, Cuff Size: Adult Regular)   Pulse 95   Temp 97  F (36.1  C) (Oral)   SpO2 94%     Physical Exam  Vitals and nursing note reviewed.   Constitutional:       General: He is not in acute distress.     Appearance: Normal appearance. He is obese. He is not ill-appearing.   Eyes:      Extraocular Movements: Extraocular movements intact.      Conjunctiva/sclera: Conjunctivae normal.   Cardiovascular:      Rate and Rhythm: Normal rate and regular rhythm.      Pulses: Normal pulses.      Heart sounds: Normal heart sounds.      Comments: +3 pitting edema bilaterally.    Pulmonary:      Effort: Pulmonary effort is normal.      Breath sounds: Normal breath sounds.   Abdominal:      Tenderness: There is no right CVA tenderness or left CVA tenderness.   Skin:     General: Skin is warm and dry.   Neurological:      Mental Status: He is alert.   Psychiatric:         Mood and Affect: Mood normal.         Labs:  No results found for this or any previous visit (from the past 24 hour(s)).    X-Ray was not done.    ASSESSMENT:      ICD-10-CM    1. 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 PE (pulmonary thromboembolism) (H)  I26.99    3. Chronic obstructive pulmonary disease, unspecified COPD type (H)  J44.9    4. Paroxysmal A-fib (H)  I48.0    5. Morbid obesity (H)  E66.01    6. Type 2 diabetes mellitus without complication, without long-term current use of insulin (H)  E11.9    7. Coronary artery disease involving native coronary artery with angina pectoris, unspecified whether native or transplanted heart (H)  I25.119         Medical Decision Making:    Differential  Diagnosis:  Anxiety, incorrect usage of oximeter, hypoventilation syndrome    Serious Comorbid Conditions:  Adult:  reviewed    PLAN:  Patient's vitals are normal.  Patient states he has compression stockings but did not wear them for no particular reason. Encouraged to wear them and to f/u with pcp.  Patient sent to  to make appointment. Discussed reasons to seek immediate medical attention.        Followup:    If not improving or if condition worsens, follow up with your Primary Care Provider, If not improving or if conditions worsens over the next 12-24 hours, go to the Emergency Department    There are no Patient Instructions on file for this visit.

## 2022-04-26 ASSESSMENT — ANXIETY QUESTIONNAIRES
5. BEING SO RESTLESS THAT IT IS HARD TO SIT STILL: NOT AT ALL
GAD7 TOTAL SCORE: 5
4. TROUBLE RELAXING: SEVERAL DAYS
7. FEELING AFRAID AS IF SOMETHING AWFUL MIGHT HAPPEN: NOT AT ALL
7. FEELING AFRAID AS IF SOMETHING AWFUL MIGHT HAPPEN: NOT AT ALL
6. BECOMING EASILY ANNOYED OR IRRITABLE: SEVERAL DAYS
2. NOT BEING ABLE TO STOP OR CONTROL WORRYING: SEVERAL DAYS
GAD7 TOTAL SCORE: 5
1. FEELING NERVOUS, ANXIOUS, OR ON EDGE: SEVERAL DAYS
3. WORRYING TOO MUCH ABOUT DIFFERENT THINGS: SEVERAL DAYS
GAD7 TOTAL SCORE: 5

## 2022-04-26 ASSESSMENT — PATIENT HEALTH QUESTIONNAIRE - PHQ9
10. IF YOU CHECKED OFF ANY PROBLEMS, HOW DIFFICULT HAVE THESE PROBLEMS MADE IT FOR YOU TO DO YOUR WORK, TAKE CARE OF THINGS AT HOME, OR GET ALONG WITH OTHER PEOPLE: SOMEWHAT DIFFICULT
SUM OF ALL RESPONSES TO PHQ QUESTIONS 1-9: 6
SUM OF ALL RESPONSES TO PHQ QUESTIONS 1-9: 6

## 2022-04-27 ASSESSMENT — PATIENT HEALTH QUESTIONNAIRE - PHQ9: SUM OF ALL RESPONSES TO PHQ QUESTIONS 1-9: 6

## 2022-04-27 ASSESSMENT — ANXIETY QUESTIONNAIRES: GAD7 TOTAL SCORE: 5

## 2022-05-03 ENCOUNTER — OFFICE VISIT (OUTPATIENT)
Dept: FAMILY MEDICINE | Facility: CLINIC | Age: 82
End: 2022-05-03
Payer: MEDICARE

## 2022-05-03 VITALS
WEIGHT: 263.7 LBS | BODY MASS INDEX: 37.75 KG/M2 | HEIGHT: 70 IN | HEART RATE: 83 BPM | SYSTOLIC BLOOD PRESSURE: 105 MMHG | DIASTOLIC BLOOD PRESSURE: 69 MMHG | TEMPERATURE: 98.3 F | RESPIRATION RATE: 16 BRPM | OXYGEN SATURATION: 93 %

## 2022-05-03 DIAGNOSIS — I89.0 LYMPHEDEMA: ICD-10-CM

## 2022-05-03 DIAGNOSIS — J42 CHRONIC BRONCHITIS, UNSPECIFIED CHRONIC BRONCHITIS TYPE (H): Primary | ICD-10-CM

## 2022-05-03 DIAGNOSIS — I50.32 CHRONIC DIASTOLIC HEART FAILURE (H): ICD-10-CM

## 2022-05-03 DIAGNOSIS — E78.49 OTHER HYPERLIPIDEMIA: ICD-10-CM

## 2022-05-03 DIAGNOSIS — E11.9 TYPE 2 DIABETES MELLITUS WITHOUT COMPLICATION, WITHOUT LONG-TERM CURRENT USE OF INSULIN (H): ICD-10-CM

## 2022-05-03 DIAGNOSIS — I50.33 ACUTE ON CHRONIC HEART FAILURE WITH PRESERVED EJECTION FRACTION (H): ICD-10-CM

## 2022-05-03 DIAGNOSIS — R09.02 HYPOXIA: ICD-10-CM

## 2022-05-03 DIAGNOSIS — M25.511 CHRONIC PAIN OF BOTH SHOULDERS: ICD-10-CM

## 2022-05-03 DIAGNOSIS — M25.512 CHRONIC PAIN OF BOTH SHOULDERS: ICD-10-CM

## 2022-05-03 DIAGNOSIS — I10 PRIMARY HYPERTENSION: ICD-10-CM

## 2022-05-03 DIAGNOSIS — G89.29 CHRONIC PAIN OF BOTH SHOULDERS: ICD-10-CM

## 2022-05-03 LAB
ERYTHROCYTE [DISTWIDTH] IN BLOOD BY AUTOMATED COUNT: 16.4 % (ref 10–15)
HBA1C MFR BLD: 5.8 % (ref 0–5.6)
HCT VFR BLD AUTO: 40.1 % (ref 40–53)
HGB BLD-MCNC: 12.8 G/DL (ref 13.3–17.7)
MCH RBC QN AUTO: 32.4 PG (ref 26.5–33)
MCHC RBC AUTO-ENTMCNC: 31.9 G/DL (ref 31.5–36.5)
MCV RBC AUTO: 102 FL (ref 78–100)
PLATELET # BLD AUTO: 251 10E3/UL (ref 150–450)
RBC # BLD AUTO: 3.95 10E6/UL (ref 4.4–5.9)
WBC # BLD AUTO: 11.2 10E3/UL (ref 4–11)

## 2022-05-03 PROCEDURE — 83036 HEMOGLOBIN GLYCOSYLATED A1C: CPT | Performed by: INTERNAL MEDICINE

## 2022-05-03 PROCEDURE — 36415 COLL VENOUS BLD VENIPUNCTURE: CPT | Performed by: INTERNAL MEDICINE

## 2022-05-03 PROCEDURE — 99215 OFFICE O/P EST HI 40 MIN: CPT | Performed by: INTERNAL MEDICINE

## 2022-05-03 PROCEDURE — 80053 COMPREHEN METABOLIC PANEL: CPT | Performed by: INTERNAL MEDICINE

## 2022-05-03 PROCEDURE — 80061 LIPID PANEL: CPT | Performed by: INTERNAL MEDICINE

## 2022-05-03 PROCEDURE — 85027 COMPLETE CBC AUTOMATED: CPT | Performed by: INTERNAL MEDICINE

## 2022-05-03 PROCEDURE — 83880 ASSAY OF NATRIURETIC PEPTIDE: CPT | Performed by: INTERNAL MEDICINE

## 2022-05-03 RX ORDER — TRAMADOL HYDROCHLORIDE 50 MG/1
50 TABLET ORAL EVERY 6 HOURS PRN
Qty: 60 TABLET | Refills: 0 | Status: SHIPPED | OUTPATIENT
Start: 2022-05-04 | End: 2022-05-03

## 2022-05-03 RX ORDER — TRAMADOL HYDROCHLORIDE 50 MG/1
50 TABLET ORAL EVERY 6 HOURS PRN
Qty: 60 TABLET | Refills: 0 | Status: SHIPPED | OUTPATIENT
Start: 2022-05-04 | End: 2022-05-04

## 2022-05-03 ASSESSMENT — PATIENT HEALTH QUESTIONNAIRE - PHQ9
SUM OF ALL RESPONSES TO PHQ QUESTIONS 1-9: 6
10. IF YOU CHECKED OFF ANY PROBLEMS, HOW DIFFICULT HAVE THESE PROBLEMS MADE IT FOR YOU TO DO YOUR WORK, TAKE CARE OF THINGS AT HOME, OR GET ALONG WITH OTHER PEOPLE: SOMEWHAT DIFFICULT

## 2022-05-03 ASSESSMENT — ANXIETY QUESTIONNAIRES: GAD7 TOTAL SCORE: 5

## 2022-05-03 ASSESSMENT — PAIN SCALES - GENERAL: PAINLEVEL: SEVERE PAIN (6)

## 2022-05-03 NOTE — CONFIDENTIAL NOTE
Refill request    Medication: traMADol (ULTRAM) 50 MG tablet      Last clinic appointment: 4/18/22  Next clinic appointment: 7/18/22    Last Drug Screen Collected: 9/20/21  Controlled Substance Agreement signed: 9/20/21      Preferred pharmacy:    [facility] #[id number] - [place], [place] - [address] AT [address] [place] [address]      Linda Serrano RN

## 2022-05-03 NOTE — TELEPHONE ENCOUNTER
Signed Prescriptions:                        Disp   Refills    traMADol (ULTRAM) 50 MG tablet             60 tab*0        Sig: Take 1 tablet (50 mg) by mouth every 6 hours as           needed for severe pain Max of 2 tabs/day.  Authorizing Provider: LAURA SANTIZO      Reviewed [place] Prescription Monitoring Program, appears appropriate.     VAMSI Zuluaga [facility]

## 2022-05-03 NOTE — PROGRESS NOTES
"  Assessment & Plan   Problem List Items Addressed This Visit        Respiratory    COPD (chronic obstructive pulmonary disease) (H) - Primary       Endocrine    HLD (hyperlipidemia)    Relevant Orders    Lipid panel reflex to direct LDL Fasting    Diabetes mellitus, type 2 (H)    Relevant Orders    Hemoglobin A1c (Completed)       Circulatory    HTN (hypertension)    Chronic diastolic heart failure (H)    Relevant Orders    BNP-N terminal pro    CBC with platelets (Completed)    (HFpEF) heart failure with preserved ejection fraction (H)       Other    Lymphedema      Other Visit Diagnoses     Hypoxia        Relevant Orders    XR Chest 2 Views    Comprehensive metabolic panel (BMP + Alb, Alk Phos, ALT, AST, Total. Bili, TP)         His oxygen is in the 93% range occasionally goes to 94% possible element of hypoventilation due to obesity.  Advised to keep taking deep breaths he is also facemask on but can lowered by them 1%, he does have some left lower lobe rales on auscultation but he is not in any distress we will check a chest x-ray.  His blood pressure is on the low side will avoid increasing metolazone to avoid further tachycardia and risk of increasing orthostasis.  Is compliant with rest of medications we will check chemistry panel, he was started on Flomax couple months ago but states it has no benefit still has some weak stream.  Advise follow-up with urology we can increase to 2.4 twice daily but there would be a possible risk of orthostatic hypotension with such.  Keep well-hydrated further recommendation pending chemistry lab results.         BMI:   Estimated body mass index is 37.84 kg/m  as calculated from the following:    Height as of this encounter: 1.778 m (5' 10\").    Weight as of this encounter: 119.6 kg (263 lb 11.2 oz).   Weight management plan: Discussed healthy diet and exercise guidelines    Work on weight loss  Regular exercise  See Patient Instructions    Return in about 4 weeks (around " 2022), or if symptoms worsen or fail to improve, for As needed and if symptoms worsen, Physical Exam, BP Recheck.  Total time spent was 40 minutes review of records exam and recommendations  Anne Heath MD  [facility] [place] Zam[place] is a 81 year old who presents for the following health issues     History of Present Illness       Mental Health Follow-up:  Patient presents to follow-up on Depression & Anxiety.Patient's depression since last visit has been:  Worse  The patient is not having other symptoms associated with depression.  Patient's anxiety since last visit has been:  Worse  The patient is not having other symptoms associated with anxiety.  Any significant life events: health concerns  Patient is feeling anxious or having panic attacks.  Patient has no concerns about alcohol or drug use.       Today's PHQ-9         PHQ-9 Total Score: 6  PHQ-9 Q9 Thoughts of better off dead/self-harm past 2 weeks :   (P) Not at all    How difficult have these problems made it for you to do your work, take care of things at home, or get along with other people: Somewhat difficult    Today's SALO-7 Score: 5    Vascular Disease:  He presents for follow up of vascular disease.  He never takes nitroglycerin. He takes daily aspirin.    Reason for visit:  Follow up from urgent care  Symptoms include:  Swelling in the legs and feet  Symptom intensity:  Moderate  Symptom progression:  Staying the same  Had these symptoms before:  Yes  Has tried/received treatment for these symptoms:  Yes  Previous treatment was successful:  Yes  Prior treatment description:  Vascular surgery  What makes it worse:  Diet and forgetting to have feet and lets up  What makes it better:  Watching  and elevating feet    He eats 0-1 servings of fruits and vegetables daily.He consumes 0 sweetened beverage(s) daily.He exercises with enough effort to increase his heart rate 10 to 19 minutes per day.  He exercises with  "enough effort to increase his heart rate 4 days per week.   He is taking medications regularly.       Presenting for follow-up from urgent care,Feeling of generalized weakness, had low O2 complaints 20 pulse was seen in the urgent care his oxygen was in the mid 90s.  Patient finished 2 weeks of Augmentin for suspected cellulitis has chronic lymphedema possible cellulitis to the left lower extremity.  Patient is feeling anxious about an upcoming surgery for his shoulders.  He is currently using his BiPAP compliantly is at.  Is compliant with rest of medications but he is taking only 1 time a day of metolazone set of twice daily.  Denies any dizziness lightheadedness there is occasional orthostatic, no chest pain or difficulty breathing or cough or phlegm production.  He has been doing some walking.    Review of Systems   Constitutional, HEENT, cardiovascular, pulmonary, gi and gu systems are negative, except as otherwise noted.      Objective    /69 (BP Location: Left arm, Cuff Size: Adult Large)   Pulse 83   Temp 98.3  F (36.8  C) (Tympanic)   Resp 16   Ht 1.778 m (5' 10\")   Wt 119.6 kg (263 lb 11.2 oz)   SpO2 93%   BMI 37.84 kg/m    Body mass index is 37.84 kg/m .  Physical Exam   GENERAL: healthy, alert and no distress  EYES: Eyes grossly normal to inspection, PERRL and conjunctivae and sclerae normal  NECK: no adenopathy, no asymmetry, masses, or scars and thyroid normal to palpation  RESP: Mild left lower lobe rales, no use of accessory muscles good chest rise bilateral, no wheeze  CV: regular rate and rhythm, normal S1 S2, no S3 or S4, no murmur, click or rub, no peripheral edema and peripheral pulses strong  ABDOMEN: soft, obese, nontender, no hepatosplenomegaly, no masses and bowel sounds normal  MS: no gross musculoskeletal defects noted, +2 nonpitting edema, stasis dermatitis, no evidence of cellulitis  SKIN: Multiple ecchymotic lesions there is also elevated brown pigmented lesion on the right " parietal area of scalp (dermatology)  NEURO: Normal strength and tone, mentation intact and speech normal  PSYCH: mentation appears normal, affect normal/bright    Lab on 04/07/2022   Component Date Value Ref Range Status     INR 04/07/2022 2.7 (A) 0.9 - 1.1 Final

## 2022-05-03 NOTE — TELEPHONE ENCOUNTER
Not sure went wrong but it looks like transmission failure, I sent it through again.    Signed Prescriptions:                        Disp   Refills    traMADol (ULTRAM) 50 MG tablet             60 tab*0        Sig: Take 1 tablet (50 mg) by mouth every 6 hours as           needed for severe pain Max of 2 tabs/day.  Authorizing Provider: LAURA SANTIZO APRN [facility] Management

## 2022-05-03 NOTE — TELEPHONE ENCOUNTER
[facility] Call Center    Phone Message    May a detailed message be left on voicemail: yes     Reason for Call: Medication Refill Request    Has the patient contacted the pharmacy for the refill? Yes   Name of medication being requested: traMADol (ULTRAM) 50 MG tablet     Provider who prescribed the medication: Sarah Duarte  Pharmacy: [facility]  Date medication is needed: 5/3/22         Action Taken: Message routed to:  [facility]

## 2022-05-04 LAB
ALBUMIN SERPL-MCNC: 3.5 G/DL (ref 3.4–5)
ALP SERPL-CCNC: 109 U/L (ref 40–150)
ALT SERPL W P-5'-P-CCNC: 23 U/L (ref 0–70)
ANION GAP SERPL CALCULATED.3IONS-SCNC: 3 MMOL/L (ref 3–14)
AST SERPL W P-5'-P-CCNC: 17 U/L (ref 0–45)
BILIRUB SERPL-MCNC: 0.4 MG/DL (ref 0.2–1.3)
BUN SERPL-MCNC: 47 MG/DL (ref 7–30)
CALCIUM SERPL-MCNC: 9.5 MG/DL (ref 8.5–10.1)
CHLORIDE BLD-SCNC: 101 MMOL/L (ref 94–109)
CHOLEST SERPL-MCNC: 141 MG/DL
CO2 SERPL-SCNC: 34 MMOL/L (ref 20–32)
CREAT SERPL-MCNC: 1.21 MG/DL (ref 0.66–1.25)
FASTING STATUS PATIENT QL REPORTED: NO
GFR SERPL CREATININE-BSD FRML MDRD: 60 ML/MIN/1.73M2
GLUCOSE BLD-MCNC: 90 MG/DL (ref 70–99)
HDLC SERPL-MCNC: 48 MG/DL
LDLC SERPL CALC-MCNC: 57 MG/DL
NONHDLC SERPL-MCNC: 93 MG/DL
NT-PROBNP SERPL-MCNC: 175 PG/ML (ref 0–1800)
POTASSIUM BLD-SCNC: 4 MMOL/L (ref 3.4–5.3)
PROT SERPL-MCNC: 7.8 G/DL (ref 6.8–8.8)
SODIUM SERPL-SCNC: 138 MMOL/L (ref 133–144)
TRIGL SERPL-MCNC: 180 MG/DL

## 2022-05-04 RX ORDER — TRAMADOL HYDROCHLORIDE 50 MG/1
50 TABLET ORAL EVERY 6 HOURS PRN
Qty: 60 TABLET | Refills: 0 | Status: SHIPPED | OUTPATIENT
Start: 2022-05-04 | End: 2022-06-02

## 2022-05-04 NOTE — TELEPHONE ENCOUNTER
Signed Prescriptions:                        Disp   Refills    traMADol (ULTRAM) 50 MG tablet             60 tab*0        Sig: Take 1 tablet (50 mg) by mouth every 6 hours as           needed for severe pain Max of 2 tabs/day.  Authorizing Provider: LAURA SANTIZO      Per nursing, issue with e-prescription again. I sent over a third time.    VAMSI Zuluaga [facility] Pain Management

## 2022-05-05 NOTE — RESULT ENCOUNTER NOTE
Please can we Check with radiology chest x-ray has not been reported yet?  Thank you for follow-up  Dr. Heath

## 2022-05-08 ENCOUNTER — MYC MEDICAL ADVICE (OUTPATIENT)
Dept: FAMILY MEDICINE | Facility: CLINIC | Age: 82
End: 2022-05-08
Payer: MEDICARE

## 2022-05-08 DIAGNOSIS — I50.32 CHRONIC DIASTOLIC HEART FAILURE (H): ICD-10-CM

## 2022-05-08 DIAGNOSIS — E03.9 HYPOTHYROIDISM, UNSPECIFIED TYPE: ICD-10-CM

## 2022-05-09 RX ORDER — METOLAZONE 2.5 MG/1
2.5 TABLET ORAL 2 TIMES DAILY
Qty: 180 TABLET | Refills: 0 | Status: SHIPPED | OUTPATIENT
Start: 2022-05-09 | End: 2022-08-11

## 2022-05-09 NOTE — TELEPHONE ENCOUNTER
PCP,   Please see patient's MyChart and reply back to patient or advise if triage follow up needed.    5/3/22 OV notes:   Is compliant with rest of medications but he is taking only 1 time a day of metolazone set of twice daily.       Routing refill request to provider for review/approval because:  Patient reports taking differently than previously Rxd.      Rx pended, however note that patient takes differently than previousl prescribed, so may need new sig.   Pharmacy pended.      Thank you,  Lanny GARNICA RN,BSN

## 2022-05-18 LAB
MDC_IDC_EPISODE_DTM: NORMAL
MDC_IDC_EPISODE_DURATION: 10 S
MDC_IDC_EPISODE_DURATION: 16 S
MDC_IDC_EPISODE_DURATION: 17 S
MDC_IDC_EPISODE_DURATION: 5 S
MDC_IDC_EPISODE_DURATION: 5 S
MDC_IDC_EPISODE_DURATION: 6 S
MDC_IDC_EPISODE_DURATION: 7 S
MDC_IDC_EPISODE_DURATION: 8 S
MDC_IDC_EPISODE_DURATION: 8 S
MDC_IDC_EPISODE_ID: NORMAL
MDC_IDC_EPISODE_TYPE: NORMAL
MDC_IDC_LEAD_IMPLANT_DT: NORMAL
MDC_IDC_LEAD_IMPLANT_DT: NORMAL
MDC_IDC_LEAD_LOCATION: NORMAL
MDC_IDC_LEAD_LOCATION: NORMAL
MDC_IDC_LEAD_LOCATION_DETAIL_1: NORMAL
MDC_IDC_LEAD_LOCATION_DETAIL_1: NORMAL
MDC_IDC_LEAD_MFG: NORMAL
MDC_IDC_LEAD_MFG: NORMAL
MDC_IDC_LEAD_MODEL: NORMAL
MDC_IDC_LEAD_MODEL: NORMAL
MDC_IDC_LEAD_POLARITY_TYPE: NORMAL
MDC_IDC_LEAD_POLARITY_TYPE: NORMAL
MDC_IDC_LEAD_SERIAL: NORMAL
MDC_IDC_LEAD_SERIAL: NORMAL
MDC_IDC_MSMT_BATTERY_DTM: NORMAL
MDC_IDC_MSMT_BATTERY_REMAINING_LONGEVITY: 30 MO
MDC_IDC_MSMT_BATTERY_REMAINING_PERCENTAGE: 48 %
MDC_IDC_MSMT_BATTERY_STATUS: NORMAL
MDC_IDC_MSMT_LEADCHNL_RA_IMPEDANCE_VALUE: 459 OHM
MDC_IDC_MSMT_LEADCHNL_RA_PACING_THRESHOLD_AMPLITUDE: 0.6 V
MDC_IDC_MSMT_LEADCHNL_RA_PACING_THRESHOLD_PULSEWIDTH: 0.4 MS
MDC_IDC_MSMT_LEADCHNL_RV_IMPEDANCE_VALUE: 384 OHM
MDC_IDC_MSMT_LEADCHNL_RV_PACING_THRESHOLD_AMPLITUDE: 1.9 V
MDC_IDC_MSMT_LEADCHNL_RV_PACING_THRESHOLD_PULSEWIDTH: 0.4 MS
MDC_IDC_PG_IMPLANT_DTM: NORMAL
MDC_IDC_PG_MFG: NORMAL
MDC_IDC_PG_MODEL: NORMAL
MDC_IDC_PG_SERIAL: NORMAL
MDC_IDC_PG_TYPE: NORMAL
MDC_IDC_SESS_CLINIC_NAME: NORMAL
MDC_IDC_SESS_DTM: NORMAL
MDC_IDC_SESS_TYPE: NORMAL
MDC_IDC_SET_BRADY_AT_MODE_SWITCH_MODE: NORMAL
MDC_IDC_SET_BRADY_AT_MODE_SWITCH_RATE: 150 {BEATS}/MIN
MDC_IDC_SET_BRADY_LOWRATE: 60 {BEATS}/MIN
MDC_IDC_SET_BRADY_MAX_SENSOR_RATE: 130 {BEATS}/MIN
MDC_IDC_SET_BRADY_MAX_TRACKING_RATE: 130 {BEATS}/MIN
MDC_IDC_SET_BRADY_MODE: NORMAL
MDC_IDC_SET_BRADY_PAV_DELAY_HIGH: 220 MS
MDC_IDC_SET_BRADY_PAV_DELAY_LOW: 250 MS
MDC_IDC_SET_BRADY_SAV_DELAY_HIGH: 220 MS
MDC_IDC_SET_BRADY_SAV_DELAY_LOW: 250 MS
MDC_IDC_SET_LEADCHNL_RA_PACING_AMPLITUDE: 2 V
MDC_IDC_SET_LEADCHNL_RA_PACING_POLARITY: NORMAL
MDC_IDC_SET_LEADCHNL_RA_PACING_PULSEWIDTH: 0.4 MS
MDC_IDC_SET_LEADCHNL_RA_SENSING_ADAPTATION_MODE: NORMAL
MDC_IDC_SET_LEADCHNL_RA_SENSING_POLARITY: NORMAL
MDC_IDC_SET_LEADCHNL_RA_SENSING_SENSITIVITY: 0.5 MV
MDC_IDC_SET_LEADCHNL_RV_PACING_AMPLITUDE: 2.2 V
MDC_IDC_SET_LEADCHNL_RV_PACING_CAPTURE_MODE: NORMAL
MDC_IDC_SET_LEADCHNL_RV_PACING_POLARITY: NORMAL
MDC_IDC_SET_LEADCHNL_RV_PACING_PULSEWIDTH: 0.4 MS
MDC_IDC_SET_LEADCHNL_RV_SENSING_ADAPTATION_MODE: NORMAL
MDC_IDC_SET_LEADCHNL_RV_SENSING_POLARITY: NORMAL
MDC_IDC_SET_LEADCHNL_RV_SENSING_SENSITIVITY: 1 MV
MDC_IDC_SET_ZONE_DETECTION_INTERVAL: 375 MS
MDC_IDC_SET_ZONE_TYPE: NORMAL
MDC_IDC_SET_ZONE_VENDOR_TYPE: NORMAL
MDC_IDC_STAT_AT_BURDEN_PERCENT: 1 %
MDC_IDC_STAT_AT_DTM_END: NORMAL
MDC_IDC_STAT_AT_DTM_START: NORMAL
MDC_IDC_STAT_BRADY_DTM_END: NORMAL
MDC_IDC_STAT_BRADY_DTM_START: NORMAL
MDC_IDC_STAT_BRADY_RA_PERCENT_PACED: 15 %
MDC_IDC_STAT_BRADY_RV_PERCENT_PACED: 0 %
MDC_IDC_STAT_EPISODE_RECENT_COUNT: 0
MDC_IDC_STAT_EPISODE_RECENT_COUNT: 0
MDC_IDC_STAT_EPISODE_RECENT_COUNT: 18
MDC_IDC_STAT_EPISODE_RECENT_COUNT: 6
MDC_IDC_STAT_EPISODE_RECENT_COUNT_DTM_END: NORMAL
MDC_IDC_STAT_EPISODE_RECENT_COUNT_DTM_START: NORMAL
MDC_IDC_STAT_EPISODE_TYPE: NORMAL
MDC_IDC_STAT_EPISODE_VENDOR_TYPE: NORMAL

## 2022-05-19 ENCOUNTER — TELEPHONE (OUTPATIENT)
Dept: FAMILY MEDICINE | Facility: CLINIC | Age: 82
End: 2022-05-19
Payer: MEDICARE

## 2022-05-19 NOTE — TELEPHONE ENCOUNTER
ANTICOAGULATION     Erich Craven is overdue for INR check.     Spoke with Jaun and scheduled lab appointment on 5/23/22    Niocle Gaytan RN

## 2022-05-23 ENCOUNTER — ANTICOAGULATION THERAPY VISIT (OUTPATIENT)
Dept: ANTICOAGULATION | Facility: CLINIC | Age: 82
End: 2022-05-23

## 2022-05-23 ENCOUNTER — LAB (OUTPATIENT)
Dept: LAB | Facility: CLINIC | Age: 82
End: 2022-05-23
Payer: MEDICARE

## 2022-05-23 DIAGNOSIS — I82.4Z9 DEEP VEIN THROMBOSIS (DVT) OF DISTAL VEIN OF LOWER EXTREMITY, UNSPECIFIED CHRONICITY, UNSPECIFIED LATERALITY (H): ICD-10-CM

## 2022-05-23 DIAGNOSIS — I26.99 PE (PULMONARY THROMBOEMBOLISM) (H): ICD-10-CM

## 2022-05-23 DIAGNOSIS — I50.32 CHRONIC DIASTOLIC HEART FAILURE (H): Primary | ICD-10-CM

## 2022-05-23 DIAGNOSIS — N18.31 CHRONIC KIDNEY DISEASE, STAGE 3A (H): ICD-10-CM

## 2022-05-23 DIAGNOSIS — Z79.01 LONG TERM CURRENT USE OF ANTICOAGULANTS WITH INR GOAL OF 2.0-3.0: ICD-10-CM

## 2022-05-23 DIAGNOSIS — I26.99 PE (PULMONARY THROMBOEMBOLISM) (H): Primary | ICD-10-CM

## 2022-05-23 DIAGNOSIS — I48.0 PAROXYSMAL A-FIB (H): ICD-10-CM

## 2022-05-23 LAB
ALBUMIN SERPL-MCNC: 3.7 G/DL (ref 3.4–5)
ANION GAP SERPL CALCULATED.3IONS-SCNC: 5 MMOL/L (ref 3–14)
BUN SERPL-MCNC: 45 MG/DL (ref 7–30)
CALCIUM SERPL-MCNC: 9.4 MG/DL (ref 8.5–10.1)
CHLORIDE BLD-SCNC: 99 MMOL/L (ref 94–109)
CO2 SERPL-SCNC: 35 MMOL/L (ref 20–32)
CREAT SERPL-MCNC: 1.16 MG/DL (ref 0.66–1.25)
CREAT UR-MCNC: 49 MG/DL
GFR SERPL CREATININE-BSD FRML MDRD: 62 ML/MIN/1.73M2
GLUCOSE BLD-MCNC: 97 MG/DL (ref 70–99)
HGB BLD-MCNC: 12.5 G/DL (ref 13.3–17.7)
INR BLD: 2.2 (ref 0.9–1.1)
PHOSPHATE SERPL-MCNC: 3.7 MG/DL (ref 2.5–4.5)
POTASSIUM BLD-SCNC: 3.6 MMOL/L (ref 3.4–5.3)
PROT UR-MCNC: 0.09 G/L
PROT/CREAT 24H UR: 0.18 G/G CR (ref 0–0.2)
PTH-INTACT SERPL-MCNC: 92 PG/ML (ref 18–80)
SODIUM SERPL-SCNC: 139 MMOL/L (ref 133–144)

## 2022-05-23 PROCEDURE — 85610 PROTHROMBIN TIME: CPT

## 2022-05-23 PROCEDURE — 82247 BILIRUBIN TOTAL: CPT

## 2022-05-23 PROCEDURE — 80069 RENAL FUNCTION PANEL: CPT

## 2022-05-23 PROCEDURE — 83970 ASSAY OF PARATHORMONE: CPT

## 2022-05-23 PROCEDURE — 82306 VITAMIN D 25 HYDROXY: CPT

## 2022-05-23 PROCEDURE — 84156 ASSAY OF PROTEIN URINE: CPT

## 2022-05-23 PROCEDURE — 84075 ASSAY ALKALINE PHOSPHATASE: CPT

## 2022-05-23 PROCEDURE — 84450 TRANSFERASE (AST) (SGOT): CPT

## 2022-05-23 PROCEDURE — 84443 ASSAY THYROID STIM HORMONE: CPT | Performed by: INTERNAL MEDICINE

## 2022-05-23 PROCEDURE — 36416 COLLJ CAPILLARY BLOOD SPEC: CPT

## 2022-05-23 PROCEDURE — 84155 ASSAY OF PROTEIN SERUM: CPT

## 2022-05-23 PROCEDURE — 85018 HEMOGLOBIN: CPT

## 2022-05-23 PROCEDURE — 36415 COLL VENOUS BLD VENIPUNCTURE: CPT

## 2022-05-23 NOTE — PROGRESS NOTES
ANTICOAGULATION MANAGEMENT     Erich Craven 82 year old male is on warfarin with therapeutic INR result. (Goal INR 2.0-3.0)    Recent labs: (last 7 days)     05/23/22  1356   INR 2.2*       ASSESSMENT       Source(s): Chart Review    Previous INR was Therapeutic last 2(+) visits    Medication, diet, health changes since last INR chart reviewed; none identified           PLAN     Unable to reach Jaun today.    Left message to continue current dose of warfarin 5 mg tonight. Request call back for assessment.    Follow up required to assess for changes     Trupti Rosas RN  Anticoagulation Clinic  5/23/2022

## 2022-05-24 ENCOUNTER — TELEPHONE (OUTPATIENT)
Dept: FAMILY MEDICINE | Facility: CLINIC | Age: 82
End: 2022-05-24

## 2022-05-24 DIAGNOSIS — I26.99 PE (PULMONARY THROMBOEMBOLISM) (H): ICD-10-CM

## 2022-05-24 DIAGNOSIS — Z79.01 LONG TERM CURRENT USE OF ANTICOAGULANTS WITH INR GOAL OF 2.0-3.0: Primary | ICD-10-CM

## 2022-05-24 DIAGNOSIS — I48.0 PAROXYSMAL A-FIB (H): ICD-10-CM

## 2022-05-24 DIAGNOSIS — I82.4Z9 DEEP VEIN THROMBOSIS (DVT) OF DISTAL VEIN OF LOWER EXTREMITY, UNSPECIFIED CHRONICITY, UNSPECIFIED LATERALITY (H): ICD-10-CM

## 2022-05-24 LAB — DEPRECATED CALCIDIOL+CALCIFEROL SERPL-MC: 38 UG/L (ref 20–75)

## 2022-05-24 NOTE — PROGRESS NOTES
ANTICOAGULATION MANAGEMENT     Erich Craven 82 year old male is on warfarin with therapeutic INR result. (Goal INR 2.0-3.0)    Recent labs: (last 7 days)     05/23/22  1356   INR 2.2*       ASSESSMENT       Source(s): Chart Review and Patient/Caregiver Call       Warfarin doses taken: Warfarin taken as instructed    Diet: No new diet changes identified    New illness, injury, or hospitalization: No    Medication/supplement changes: None noted    Signs or symptoms of bleeding or clotting: No    Previous INR: Therapeutic last 2(+) visits    Additional findings: Upcoming surgery/procedure Shoulder replacement surgery 7/6/22. TE sent to PharmD       PLAN     Recommended plan for no diet, medication or health factor changes affecting INR     Dosing Instructions: continue your current warfarin dose with next INR in 4 weeks       Summary  As of 5/23/2022    Full warfarin instructions:  5 mg every day   Next INR check:  6/21/2022             Telephone call with Jaun who verbalizes understanding and agrees to plan    Lab visit scheduled    Education provided: Please call back if any changes to your diet, medications or how you've been taking warfarin    Plan made per [facility] anticoagulation protocol    Trupti Rosas RN  Anticoagulation Clinic  5/24/2022    _______________________________________________________________________     Anticoagulation Episode Summary     Current INR goal:  2.0-3.0   TTR:  75.2 % (1 y)   Target end date:  Indefinite   Send INR reminders to:  [facility]    Indications    PE (pulmonary thromboembolism) (H) [I26.99]  Paroxysmal A-fib (H) [I48.0]  Deep vein thrombosis (DVT) of distal vein of lower extremity  unspecified chronicity  unspecified laterality (H) [I82.4Z9]  Long term current use of anticoagulants with INR goal of 2.0-3.0 [Z79.01]           Comments:           Anticoagulation Care Providers     Provider Role Specialty Phone number    Dontrlel Gómez MD Referring Internal  Medicine [phone]    Anne Heath MD Referring Internal Medicine [phone]

## 2022-05-24 NOTE — TELEPHONE ENCOUNTER
Upcoming surgery/procedure Shoulder replacement surgery 7/6/22. Please advise on warfarin hold plan.    Angelina Rosas RN   [facility] Anticoagulation Clinic

## 2022-05-24 NOTE — PROGRESS NOTES
PLAN     Unable to reach Jaun today.    LM to call [facility]    Turpti Rosas RN  Anticoagulation Clinic  5/24/2022

## 2022-05-27 RX ORDER — LEVOTHYROXINE SODIUM 175 UG/1
175 TABLET ORAL DAILY
Qty: 90 TABLET | Refills: 0 | Status: SHIPPED | OUTPATIENT
Start: 2022-05-27 | End: 2022-08-10

## 2022-05-27 NOTE — TELEPHONE ENCOUNTER
Appointments in Next Year    Jun 21, 2022  9:00 AM  INR LAB with CS LAB  [facility] ([facility] ) [phone]   Jun 28, 2022  8:30 AM  (Arrive by 8:15 AM)  Video Visit with Tiffany Suero NP  [facility] [place] ([facility] [place] ) [phone]   Jul 18, 2022 12:00 AM  CARDIAC DEVICE CHECK - REMOTE with SANDOVAL TECH1  [facility] ([facility] ) [phone]   Jul 18, 2022  1:30 PM  (Arrive by 1:15 PM)  Return Visit with VAMSI Carlson CNP  [facility] [place] ([facility] [place] ) [phone]        Pt asking for lab orders for upcoming lab visit     Jessica Gutierrez RN  [facility] Internal Medicine Clinic

## 2022-05-27 NOTE — TELEPHONE ENCOUNTER
TSH   Date Value Ref Range Status   10/20/2021 0.50 0.40 - 4.00 mU/L Final   06/14/2021 0.71 0.40 - 4.00 mU/L Final     TO PCP:     To clarify     1) patient reports he's been taking Metolazone once per day - should he increase to BID?     2) pt also asking for levothyroxine. Per last lab notes in Oct it was advised pt take 1 tablet daily instead of extra 1/2 tab on Sundays - last Rx sent 8/2021. Pended for updated dosing     Jessica Gutierrez RN  [facility] Internal Medicine Clinic

## 2022-05-28 LAB
ALP SERPL-CCNC: 99 U/L (ref 40–150)
AST SERPL W P-5'-P-CCNC: 16 U/L (ref 0–45)
BILIRUB SERPL-MCNC: 0.5 MG/DL (ref 0.2–1.3)
PROT SERPL-MCNC: 7.4 G/DL (ref 6.8–8.8)
TSH SERPL DL<=0.005 MIU/L-ACNC: 1.98 MU/L (ref 0.4–4)

## 2022-05-31 NOTE — TELEPHONE ENCOUNTER
levothyroxine (SYNTHROID/LEVOTHROID) 175 MCG tablet 90 tablet 0 5/27/2022  No   Sig - Route: Take 1 tablet (175 mcg) by mouth daily - Oral   Sent to pharmacy as: Levothyroxine Sodium 175 MCG Oral Tablet (SYNTHROID/LEVOTHROID)   Class: E-Prescribe   Order: [id number]   E-Prescribing Status: Receipt confirmed by pharmacy (5/27/2022  3:19 PM CDT)     Pharmacy    [facility] #[id number] - [place], [place] - [address]     Pharmacy seeking refill of levothyroxine .  Last Rx'd on 5- good for 90 days .    Too soon to renew.    Fax sent back to pharmacy - check records for Rx; not due to renew.    RT Janette (R)

## 2022-06-01 DIAGNOSIS — M25.511 CHRONIC PAIN OF BOTH SHOULDERS: ICD-10-CM

## 2022-06-01 DIAGNOSIS — G89.29 CHRONIC PAIN OF BOTH SHOULDERS: ICD-10-CM

## 2022-06-01 DIAGNOSIS — M25.512 CHRONIC PAIN OF BOTH SHOULDERS: ICD-10-CM

## 2022-06-01 NOTE — TELEPHONE ENCOUNTER
[facility] Call Center    Phone Message    May a detailed message be left on voicemail: yes     Reason for Call: Medication Refill Request    Has the patient contacted the pharmacy for the refill? Yes   Name of medication being requested: Tramadol  Provider who prescribed the medication: Dr Allen  Pharmacy: [facility] #[id number] - [place], [place] - [address] AT [address] [place] [address]    Date medication is needed: today or 6/2    Pt was told of the 5 day turnaround timeframe      Action Taken: Message routed to:  [facility]    Travel Screening: Not Applicable

## 2022-06-02 DIAGNOSIS — E03.9 HYPOTHYROIDISM, UNSPECIFIED TYPE: ICD-10-CM

## 2022-06-02 RX ORDER — TRAMADOL HYDROCHLORIDE 50 MG/1
50 TABLET ORAL EVERY 6 HOURS PRN
Qty: 60 TABLET | Refills: 0 | Status: SHIPPED | OUTPATIENT
Start: 2022-06-03 | End: 2022-06-28

## 2022-06-02 RX ORDER — TAMSULOSIN HYDROCHLORIDE 0.4 MG/1
0.4 CAPSULE ORAL DAILY
Qty: 90 CAPSULE | Refills: 2 | Status: SHIPPED | OUTPATIENT
Start: 2022-06-02 | End: 2022-10-04

## 2022-06-02 NOTE — TELEPHONE ENCOUNTER
.Signed Prescriptions:                        Disp   Refills    traMADol (ULTRAM) 50 MG tablet             60 tab*0        Sig: Take 1 tablet (50 mg) by mouth every 6 hours as           needed for severe pain Max of 2 tabs/day.  Authorizing Provider: LAURA SANTIZO        Reviewed [place] Prescription Monitoring Program, appears appropriate.     VAMSI Zuluaga [facility] Pain Management

## 2022-06-02 NOTE — TELEPHONE ENCOUNTER
Refill request    Medication: Tramadol      Last clinic appointment: 4/18/22  Next clinic appointment: 7/18/22    Last Drug Screen Collected: 9/20/21  Controlled Substance Agreement signed: 9/20/21      Preferred pharmacy:    [facility] #[id number] - [place] - [address] AT [address] [place] [address]

## 2022-06-22 NOTE — TELEPHONE ENCOUNTER
LAVERNE-PROCEDURAL ANTICOAGULATION  MANAGEMENT    ASSESSMENT     Warfarin interruption plan for total shoulder replacement on 7/6/22. 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 However, has tolerated 5-day warfarin hold for suprascapular nerve blocks x 2 in 2021 with no bridge.    Caprini Score (Procedure specific Risk of post-op VTE in surgical patients): >= 9    Laverne-Procedure Risk stratification for thromboembolism: moderate (2017 ACC periprocedure pathway for NVAF Expert Consensus)    NVAF: 2017 ACC periprocedure pathway for NVAF advises likely NO bridge for moderate risk stratification ([id number] score 5-6)  without a hx of stroke, TIA or systemic embolism and     VTE Prophylaxis, Post-Surgical: 2012 Chest guidelines recommend use of Caprini Score to guide post-surgical VTE prophylaxis. For Caprini Score >= 9: enoxaparin or heparin prophylaxis recommended for 30 days (or until therapeutic on warfarin).     RECOMMENDATION       Pre-Procedure:  o Hold warfarin for 5 days, until after procedure starting: Friday, July 1   o No pre-procedure bridge      Post-Procedure:  o Resume warfarin dose if okay with provider doing procedure on night of procedure, Wednesday, July 6 PM: take 10 mg x 1, then resume home dose  o Start enoxaparin 40 mg subcutaneous q24h ~ 24 hours post procedure if okay with provider performing surgery. Continue until INR > 2 x 1  o Recheck INR 7 days after resuming warfarin       Plan routed to referring provider for approval  ?   Rohini Anne [facility]    SUBJECTIVE/OBJECTIVE     Erich Craven, a 82 year old male    Goal INR Range: 2.0-3.0     Patient bridged in past: Yes: in 2012 with PE    Wt Readings from Last 3 Encounters:   05/03/22  "119.6 kg (263 lb 11.2 oz)   03/29/22 117.9 kg (260 lb)   03/27/22 119.7 kg (264 lb)      Ideal body weight: 73 kg (160 lb 15 oz)  Adjusted ideal body weight: 91.6 kg (202 lb 0.7 oz)     Estimated body mass index is 37.84 kg/m  as calculated from the following:    Height as of 5/3/22: 1.778 m (5' 10\").    Weight as of 5/3/22: 119.6 kg (263 lb 11.2 oz).    Lab Results   Component Value Date    INR 2.2 (H) 05/23/2022    INR 2.7 (H) 04/07/2022    INR 2.72 (H) 03/27/2022     Lab Results   Component Value Date    HGB 12.5 05/23/2022    HGB 12.2 06/14/2021    HCT 40.1 05/03/2022    HCT 36.7 06/14/2021     05/03/2022     06/14/2021     Lab Results   Component Value Date    CR 1.16 05/23/2022    CR 1.21 05/03/2022    CR 1.39 (H) 03/29/2022     Estimated Creatinine Clearance: 62.5 mL/min (based on SCr of 1.16 mg/dL).     "

## 2022-06-22 NOTE — TELEPHONE ENCOUNTER
Plan approved as written.    Anne Heath MD  Anticoag [place] 33 minutes ago (12:54 PM)     KS    Agree with plan, thank you for update   Anne    Message text       You  You; Anne Heath MD 40 minutes ago (12:48 PM)     LF    Maurice Napolese's is scheduled for his total shoulder on 7/6. With the history of PE following TKA in 2012, I would recommend DVT prophylaxis post procedure given his high Caprini score. Do you agree with this plan?   Rohini Anne, PharmD, BCPS    Routing comment

## 2022-06-22 NOTE — TELEPHONE ENCOUNTER
Writer attempted to reach patient to discuss upcoming procedure plan. No answer at this time, requested callback tomorrow or at his earliest convenience. NELA BallesterosN, RN

## 2022-06-23 RX ORDER — ENOXAPARIN SODIUM 100 MG/ML
40 INJECTION SUBCUTANEOUS EVERY 24 HOURS
Qty: 4 ML | Refills: 0 | Status: SHIPPED | OUTPATIENT
Start: 2022-07-07 | End: 2022-08-01

## 2022-06-23 NOTE — TELEPHONE ENCOUNTER
Spoke briefly with Jaun (driving). Scheduled overdue INR for tomorrow, sent pended Lovenox prescription to pharmacy. Sent detailed written hold/bridge/restart instructions via My Chart. Agreed to review in greater detail over the phone when [facility] RN calls him with INR results tomorrow.  Paola BONDS RN  Anticoagulation Team

## 2022-06-24 ENCOUNTER — ANTICOAGULATION THERAPY VISIT (OUTPATIENT)
Dept: ANTICOAGULATION | Facility: CLINIC | Age: 82
End: 2022-06-24

## 2022-06-24 ENCOUNTER — LAB (OUTPATIENT)
Dept: LAB | Facility: CLINIC | Age: 82
End: 2022-06-24
Payer: MEDICARE

## 2022-06-24 DIAGNOSIS — Z79.01 LONG TERM CURRENT USE OF ANTICOAGULANTS WITH INR GOAL OF 2.0-3.0: ICD-10-CM

## 2022-06-24 DIAGNOSIS — I26.99 PE (PULMONARY THROMBOEMBOLISM) (H): ICD-10-CM

## 2022-06-24 DIAGNOSIS — I82.4Z9 DEEP VEIN THROMBOSIS (DVT) OF DISTAL VEIN OF LOWER EXTREMITY, UNSPECIFIED CHRONICITY, UNSPECIFIED LATERALITY (H): ICD-10-CM

## 2022-06-24 DIAGNOSIS — I48.0 PAROXYSMAL A-FIB (H): ICD-10-CM

## 2022-06-24 DIAGNOSIS — I26.99 PE (PULMONARY THROMBOEMBOLISM) (H): Primary | ICD-10-CM

## 2022-06-24 LAB — INR BLD: 1.3 (ref 0.9–1.1)

## 2022-06-24 PROCEDURE — 85610 PROTHROMBIN TIME: CPT

## 2022-06-24 PROCEDURE — 36416 COLLJ CAPILLARY BLOOD SPEC: CPT

## 2022-06-24 NOTE — PROGRESS NOTES
No answer. Left vm to return call to [facility] RN.  Needs assessment for temporary factors r/t subtherapeutic INR, also needs to discuss hold/bridge/restart instructions for upcoming shoulder surgery 7/6/22. MyC message and Lovenox prescription sent yesterday.    Paola BONDS RN  Anticoagulation Team

## 2022-06-24 NOTE — PROGRESS NOTES
ANTICOAGULATION MANAGEMENT     Erich Craven 82 year old male is on warfarin with subtherapeutic INR result. (Goal INR 2.0-3.0)    Recent labs: (last 7 days)     06/24/22  0916   INR 1.3*       ASSESSMENT       Source(s): Chart Review and Patient/Caregiver Call       Warfarin doses taken: Warfarin taken as instructed, pretty sure he didn't miss a dose, he uses a pill box.     Diet: No new diet changes identified    New illness, injury, or hospitalization: No    Medication/supplement changes: None noted    Signs or symptoms of bleeding or clotting: No    Previous INR: Therapeutic last 2(+) visits    Additional findings: Upcoming surgery/procedure shoulder surgery 7/6/22. Hold/bridge schedule reviewed over the phone and MyC message sent. He expects to be in the TCU for about a week after surgery.        PLAN     Recommended plan for no diet, medication or health factor changes affecting INR     Dosing Instructions: booster dose then continue your current warfarin dose with next INR in 5 days       Summary  As of 6/24/2022    Full warfarin instructions:  6/24: 10 mg; 7/1: Hold; 7/2: Hold; 7/3: Hold; 7/4: Hold; 7/5: Hold; 7/6: 10 mg; Otherwise 5 mg every day   Next INR check:  6/29/2022             Telephone call with Jaun who verbalizes understanding and agrees to plan    Lab visit scheduled    Education provided: Please call back if any changes to your diet, medications or how you've been taking warfarin, Importance of consistent vitamin K intake, Goal range and significance of current result, Importance of following up at instructed interval, Importance of taking warfarin as instructed, Monitoring for bleeding signs and symptoms, Monitoring for clotting signs and symptoms, When to seek medical attention/emergency care and Contact [phone]  with any changes, questions or concerns.     Plan made per [facility] anticoagulation protocol    Paola Bee RN  Anticoagulation  Clinic  6/24/2022    _______________________________________________________________________     Anticoagulation Episode Summary     Current INR goal:  2.0-3.0   TTR:  68.5 % (1 y)   Target end date:  Indefinite   Send INR reminders to:  ANT[facility]AG [place]    Indications    PE (pulmonary thromboembolism) (H) [I26.99]  Paroxysmal A-fib (H) [I48.0]  Deep vein thrombosis (DVT) of distal vein of lower extremity  unspecified chronicity  unspecified laterality (H) [I82.4Z9]  Long term current use of anticoagulants with INR goal of 2.0-3.0 [Z79.01]           Comments:           Anticoagulation Care Providers     Provider Role Specialty Phone number    Dontrell Gómez MD Referring Internal Medicine [phone]    Anne Heath MD Referring Internal Medicine [phone]

## 2022-06-24 NOTE — TELEPHONE ENCOUNTER
See [facility] encounter 6/24/22 for further conversation with patient.  Paola BONDS RN  Anticoagulation Team

## 2022-06-28 ENCOUNTER — VIRTUAL VISIT (OUTPATIENT)
Dept: NEPHROLOGY | Facility: CLINIC | Age: 82
End: 2022-06-28
Payer: MEDICARE

## 2022-06-28 ENCOUNTER — TELEPHONE (OUTPATIENT)
Dept: ANESTHESIOLOGY | Facility: CLINIC | Age: 82
End: 2022-06-28

## 2022-06-28 VITALS — BODY MASS INDEX: 37.59 KG/M2 | WEIGHT: 262 LBS

## 2022-06-28 DIAGNOSIS — N18.31 CHRONIC KIDNEY DISEASE, STAGE 3A (H): Primary | ICD-10-CM

## 2022-06-28 PROCEDURE — 99214 OFFICE O/P EST MOD 30 MIN: CPT | Mod: 95

## 2022-06-28 PROCEDURE — G0463 HOSPITAL OUTPT CLINIC VISIT: HCPCS | Mod: PN,RTG

## 2022-06-28 ASSESSMENT — PAIN SCALES - GENERAL: PAINLEVEL: NO PAIN (0)

## 2022-06-28 NOTE — PROGRESS NOTES
PT did not take at home virals.     Jaun is a 82 year old who is being evaluated via a billable video visit.      How would you like to obtain your AVS? MyChart  If the video visit is dropped, the invitation should be resent by: Text to cell phone: [phone]  Will anyone else be joining your video visit? No        Video-Visit Details    Video Start Time: 0830    Type of service:  Video Visit    Video End Time:0849    Originating Location (pt. Location): home    Distant Location (provider location):  [facility] NEPHROLOGY [facility] [place]     Platform used for Video Visit: [employer]

## 2022-06-28 NOTE — LETTER
6/28/2022       RE: Erich Craven  [address]  [place] [address]     Dear Colleague,    Thank you for referring your patient, Erich Craven, to the [facility] [place] at [facility]. Please see a copy of my visit note below.    PT did not take at home virals.     Jaun is a 82 year old who is being evaluated via a billable video visit.      How would you like to obtain your AVS? MyChart  If the video visit is dropped, the invitation should be resent by: Text to cell phone: [phone]  Will anyone else be joining your video visit? No        Video-Visit Details    Video Start Time: 0830    Type of service:  Video Visit    Video End Time:0849    Originating Location (pt. Location): home    Distant Location (provider location):  [facility] [place]     Platform used for Video Visit: NanoNord    Nephrology Video Visit 6/28/22    Assessment and Plan:    1. CKD3a/b w/mild proteinuria - Creat 1.1, eGFR 62 ml/mn, UPCR 0.1 g/gCr    His creat has improved over since 3/22 following reduction in ACE. I suspect there is also some dilutional effect given ongoing edema.    Baseline creat had been running mid to upper 1's since 8/20 w/recurrent DONA but ACE dose was cut in half 10/21 and creat has been declining since.    - Etiology of his CKD likely HTN/DONA   - Renal US normal   - Does not use NSAIDs   - Blood pressure at goal    - DM well controlled with A1c of 5.6 % ( 2/22)   - He is on statin for CV risk reduction    2. CV/HTN/Pafib, Diastolic HF/PPM - Currently well controlled. Has edema but admits to increased sodium intake with recent move to new residence w/meals that are included. No dyspnea. Weight 262#, down from 292# in 6/21. Home b/ps 120's/.  Has not brought in his b/p device to be checked for accuracy. Echo 1/22 showing EF 67%, dilated IVC. He is on triple diuretic therapy. Follows with   Jumana Dixon ( last seen 6/22/21)  Current regimen:    Coreg 12.5 mg bid   Lasix 40 mg AM, 20 mg PM   Spironolactone 25 mg every other day   Lisinopril 20 mg every day   Diltiazem  mg every day   Metolazone 2.5 mg bid    - Continue current regimen     3. Electrolytes - No acute concerns. Na 139, K 3.6     4. Acid base - No acute concerns. Bicarb 35    5. BMD - Ca 9.4 Phos 3.7 albumin 3.7   Vit D 38, PTH 92 ( 6/22)   Not currently on Vit D    6. BPH - Controlled with Flomax    7. Anemia - Hgb 12.5   - Iron studies 10/21: Ferritin 81 Fe 60 IS 19   - He does not require iron/LEIGH at this time    8. Disposition - RTC 6 months for follow up w/labs prior    Assessment and plan was discussed with patient and he voiced his understanding and agreement.    Reason for Visit:  CKD/HTN    HPI:  Mr Craven is an 83 yo male with CKDa/b, COPD, RIN on BiPAP, Diastolic HF, SSS s/p PPM, CAD, P-Afib, HTN, BPH, h/o Pulmonary embolism, present today for CKD follow up. Last seen in clinic by me on 2/10/22  Baseline creat mid to upper 1's    ROS:   No complaints  Getting ready for right shoulder replacement next week at [facility]. If all goes well will have the other shoulder done as well  Reports ongoing edema but attributes to the facility food that he is now eating and increased sodium intake.   Generally Using Bipap at  for his RIN but with recent move has been less compliant  Home blood 120's/   Has completed his COVID vax series/booster and Flu vax  Denies CP/dyspnea  No abdominal concerns  Voiding w/o complication. On Flomax  Appetite is good. Has been a vegetarian for years   Walking with his dog regularly    Chronic Health Problems:    CKD3a  COPD  RIN on CPAP  Diastolic HF  SSS s/p PPM  CAD  P-Afib  h/o Pulmonary embolism  HTN  Hypothyroidism  Anxiety  BPH  HLD    Family Hx:   Family History   Problem Relation Age of Onset     Hypertension Father      Personal Hx:   , lives with his puppy Clayton. Just moved to [facility]  location in [place]. Currently living in [facility] apt connected to the [facility]. Has services and meals. He continues to set up his own medications. NS    Allergies:  Allergies   Allergen Reactions     No Known Allergies        Medications:  Current Outpatient Medications   Medication Sig     acetaminophen (TYLENOL) 500 MG tablet Take 1,000 mg by mouth 4 times daily     aspirin (ASA) 81 MG chewable tablet Take 1 tablet by mouth daily     atorvastatin (LIPITOR) 40 MG tablet TAKE 1 TABLET BY MOUTH EVERY EVENING     carvedilol (COREG) 12.5 MG tablet TAKE 1 TABLET BY MOUTH TWICE A DAY     co-enzyme Q-10 100 MG CAPS capsule Take 100 mg by mouth daily     diltiazem ER COATED BEADS (CARDIZEM CD/CARTIA XT) 300 MG 24 hr capsule TAKE 1 CAPSULE BY MOUTH EVERY DAY     [START ON 7/7/2022] enoxaparin ANTICOAGULANT (LOVENOX) 40 MG/0.4ML syringe Inject 0.4 mLs (40 mg) Subcutaneous every 24 hours To start 24 hours after surgery (scheduled 7/6/22), or per surgeon's recommendations     Fluticasone-Umeclidin-Vilanterol (TRELEGY ELLIPTA) 200-62.5-25 MCG/INH oral inhaler Inhale 1 puff into the lungs daily     furosemide (LASIX) 20 MG tablet Take 2 tabs in morning and 1 tab in the sfternoon     levothyroxine (SYNTHROID/LEVOTHROID) 175 MCG tablet Take 1 tablet (175 mcg) by mouth daily     lisinopril (ZESTRIL) 20 MG tablet Take 1 tablet (20 mg) by mouth daily     metolazone (ZAROXOLYN) 2.5 MG tablet Take 1 tablet (2.5 mg) by mouth 2 times daily     Multiple vitamin TABS Take 1 tablet by mouth daily     order for DME Oxygen 2 Li/min  Continuous. At night bled into PAP device. In the daytime, NC with bubbler: requiring portability     spironolactone (ALDACTONE) 25 MG tablet Take 1 tablet (25 mg) by mouth every other day     tamsulosin (FLOMAX) 0.4 MG capsule Take 1 capsule (0.4 mg) by mouth daily     traMADol (ULTRAM) 50 MG tablet Take 1 tablet (50 mg) by mouth every 6 hours as needed for severe pain Max of 2 tabs/day.      venlafaxine (EFFEXOR) 37.5 MG tablet Take 1 tablet (37.5 mg) by mouth 2 times daily     warfarin ANTICOAGULANT (COUMADIN) 5 MG tablet Take 1.5 tablets (7.5 mg) on Fridays and take 1 tablet (5 mg) all other days or as directed by the INR clinic     No current facility-administered medications for this visit.      Vitals:  No vitals obtained    Exam:  GEN: Pleasant male in NAD  RESP: Breathing is non labored. Speaking in full sentences  NEURO: A/O  PSYCH: Bright affect, engaging     LABS:   CMP  Recent Labs   Lab Test 05/23/22  1356 05/03/22  1656 03/29/22  1059 03/27/22  1600 08/31/21  1401 06/21/21  1454 06/14/21  1455 04/21/21  0931 03/02/21  1405    138 136 133   < > 138 137 138 136   POTASSIUM 3.6 4.0 4.7 3.9   < > 4.1 4.7 3.7 4.5   CHLORIDE 99 101 99 96   < > 103 105 99 104   CO2 35* 34* 31 32   < > 29 28 39* 28   ANIONGAP 5 3 6 5   < > 6 4 <1* 4   GLC 97 90 106* 113*   < > 100* 108* 104* 93   BUN 45* 47* 41* 54*   < > 64* 62* 44* 57*   CR 1.16 1.21 1.39* 1.69*   < > 1.57* 1.81* 1.02 1.42*   GFRESTIMATED 62 60* 51* 40*   < > 40* 34* 68 46*   GFRESTBLACK  --   --   --   --   --  47* 39* 79 53*   LAWSON 9.4 9.5 9.7 8.8   < > 9.4 9.2 9.2 9.6    < > = values in this interval not displayed.     Recent Labs   Lab Test 05/23/22 1356 05/03/22 1656 03/27/22  1600 03/25/22  2014 10/20/21  1022   BILITOTAL 0.5 0.4 0.4 0.6 0.4   ALKPHOS 99 109 90 85 108   ALT  --  23 22 26 20   AST 16 17 14 13 11     CBC  Recent Labs   Lab Test 05/23/22  1356 05/03/22  1656 03/29/22  1059 03/27/22  1600 03/25/22 2014   HGB 12.5* 12.8* 12.7* 11.7* 12.6*   WBC  --  11.2* 11.7* 10.2 12.2*   RBC  --  3.95* 3.97* 3.79* 3.87*   HCT  --  40.1 40.6 37.9* 39.1*   MCV  --  102* 102* 100 101*   MCH  --  32.4 32.0 30.9 32.6   MCHC  --  31.9 31.3* 30.9* 32.2   RDW  --  16.4* 16.2* 15.7* 15.8*   PLT  --  251 194 177 168     URINE STUDIES  Recent Labs   Lab Test 03/25/22  1855 06/14/21  1516 04/21/21  0853 12/21/20  1219 12/14/20  1706  08/11/20  1344   COLOR Yellow Yellow Yellow Yellow Yellow Yellow   APPEARANCE Clear Cloudy Clear Clear Clear Clear   URINEGLC Negative Negative Negative Negative Negative Negative   URINEBILI Negative Negative Negative Negative Negative Negative   URINEKETONE Negative Negative Negative Negative Negative Negative   SG 1.015 1.020 1.015 1.015 1.015 1.020   UBLD Negative Small* Negative Negative Trace* Negative   URINEPH 5.5 5.0 7.0 5.0 6.0 6.5   PROTEIN Negative 30* Negative Negative 30* Negative   UROBILINOGEN 0.2 0.2 0.2 0.2 0.2 0.2   NITRITE Negative Negative Negative Negative Negative Negative   LEUKEST Negative Moderate* Negative Small* Small* Small*   RBCU  --  Unable to quantitate other elements due to packed WBC's. *  --  O - 2 2-5* 2-5*   WBCU  --  >100*  --  10-25* 25-50* 25-50*     Recent Labs   Lab Test 05/23/22  1356 02/04/22  0902 10/28/21  1118 06/14/21  1516   UTPG 0.18 0.29* 0.44* 0.38*     PTH  Recent Labs   Lab Test 05/23/22  1356 06/14/21  1455   PTHI 92* 107*     IRON STUDIES  Recent Labs   Lab Test 10/28/21  1118 10/20/21  1022 02/19/21  1528   IRON 60  --   --      --   --    IRONSAT 19  --   --    SEGUNDO 81 78 97       Tiffany Suero, [place]

## 2022-06-28 NOTE — TELEPHONE ENCOUNTER
Message sent to clinic coordinators asking that they contact the pt to assist them to schedule their 12 week follow up.   Pt was last seen on 4/18/22.    The pt is established with Dr. Allen and the Nurse Practitioner Krupa Duarte.     Shani Shelton LPN

## 2022-06-28 NOTE — PROGRESS NOTES
Nephrology Video Visit 6/28/22    Assessment and Plan:    1. CKD3a/b w/mild proteinuria - Creat 1.1, eGFR 62 ml/mn, UPCR 0.1 g/gCr    His creat has improved over since 3/22 following reduction in ACE. I suspect there is also some dilutional effect given ongoing edema.    Baseline creat had been running mid to upper 1's since 8/20 w/recurrent DONA but ACE dose was cut in half 10/21 and creat has been declining since.    - Etiology of his CKD likely HTN/DONA   - Renal US normal   - Does not use NSAIDs   - Blood pressure at goal    - DM well controlled with A1c of 5.6 % ( 2/22)   - He is on statin for CV risk reduction    2. CV/HTN/Pafib, Diastolic HF/PPM - Currently well controlled. Has edema but admits to increased sodium intake with recent move to new residence w/meals that are included. No dyspnea. Weight 262#, down from 292# in 6/21. Home b/ps 120's/.  Has not brought in his b/p device to be checked for accuracy. Echo 1/22 showing EF 67%, dilated IVC. He is on triple diuretic therapy. Follows with Dr Jumana Dixon ( last seen 6/22/21)  Current regimen:    Coreg 12.5 mg bid   Lasix 40 mg AM, 20 mg PM   Spironolactone 25 mg every other day   Lisinopril 20 mg every day   Diltiazem  mg every day   Metolazone 2.5 mg bid    - Continue current regimen     3. Electrolytes - No acute concerns. Na 139, K 3.6     4. Acid base - No acute concerns. Bicarb 35    5. BMD - Ca 9.4 Phos 3.7 albumin 3.7   Vit D 38, PTH 92 ( 6/22)   Not currently on Vit D    6. BPH - Controlled with Flomax    7. Anemia - Hgb 12.5   - Iron studies 10/21: Ferritin 81 Fe 60 IS 19   - He does not require iron/LEIGH at this time    8. Disposition - RTC 6 months for follow up w/labs prior    Assessment and plan was discussed with patient and he voiced his understanding and agreement.    Reason for Visit:  CKD/HTN    HPI:  Mr Craven is an 83 yo male with CKDa/b, COPD, RIN on BiPAP, Diastolic HF, SSS s/p PPM, CAD, P-Afib, HTN, BPH, h/o Pulmonary embolism,  present today for CKD follow up. Last seen in clinic by me on 2/10/22  Baseline creat mid to upper 1's    ROS:   No complaints  Getting ready for right shoulder replacement next week at [place]. If all goes well will have the other shoulder done as well  Reports ongoing edema but attributes to the facility food that he is now eating and increased sodium intake.   Generally Using Bipap at  for his RIN but with recent move has been less compliant  Home blood 120's/   Has completed his COVID vax series/booster and Flu vax  Denies CP/dyspnea  No abdominal concerns  Voiding w/o complication. On Flomax  Appetite is good. Has been a vegetarian for years   Walking with his dog regularly    Chronic Health Problems:    CKD3a  COPD  RIN on CPAP  Diastolic HF  SSS s/p PPM  CAD  P-Afib  h/o Pulmonary embolism  HTN  Hypothyroidism  Anxiety  BPH  HLD    Family Hx:   Family History   Problem Relation Age of Onset     Hypertension Father      Personal Hx:   , lives with his puppy Clayton. Just moved to new location in [place]. Currently living in [facility] apt connected to the [place] [facility]. Has services and meals. He continues to set up his own medications. NS    Allergies:  Allergies   Allergen Reactions     No Known Allergies        Medications:  Current Outpatient Medications   Medication Sig     acetaminophen (TYLENOL) 500 MG tablet Take 1,000 mg by mouth 4 times daily     aspirin (ASA) 81 MG chewable tablet Take 1 tablet by mouth daily     atorvastatin (LIPITOR) 40 MG tablet TAKE 1 TABLET BY MOUTH EVERY EVENING     carvedilol (COREG) 12.5 MG tablet TAKE 1 TABLET BY MOUTH TWICE A DAY     co-enzyme Q-10 100 MG CAPS capsule Take 100 mg by mouth daily     diltiazem ER COATED BEADS (CARDIZEM CD/CARTIA XT) 300 MG 24 hr capsule TAKE 1 CAPSULE BY MOUTH EVERY DAY     [START ON 7/7/2022] enoxaparin ANTICOAGULANT (LOVENOX) 40 MG/0.4ML syringe Inject 0.4 mLs (40 mg) Subcutaneous every 24 hours To start 24 hours after surgery  (scheduled 7/6/22), or per surgeon's recommendations     Fluticasone-Umeclidin-Vilanterol (TRELEGY ELLIPTA) 200-62.5-25 MCG/INH oral inhaler Inhale 1 puff into the lungs daily     furosemide (LASIX) 20 MG tablet Take 2 tabs in morning and 1 tab in the sfternoon     levothyroxine (SYNTHROID/LEVOTHROID) 175 MCG tablet Take 1 tablet (175 mcg) by mouth daily     lisinopril (ZESTRIL) 20 MG tablet Take 1 tablet (20 mg) by mouth daily     metolazone (ZAROXOLYN) 2.5 MG tablet Take 1 tablet (2.5 mg) by mouth 2 times daily     Multiple vitamin TABS Take 1 tablet by mouth daily     order for DME Oxygen 2 Li/min  Continuous. At night bled into PAP device. In the daytime, NC with bubbler: requiring portability     spironolactone (ALDACTONE) 25 MG tablet Take 1 tablet (25 mg) by mouth every other day     tamsulosin (FLOMAX) 0.4 MG capsule Take 1 capsule (0.4 mg) by mouth daily     traMADol (ULTRAM) 50 MG tablet Take 1 tablet (50 mg) by mouth every 6 hours as needed for severe pain Max of 2 tabs/day.     venlafaxine (EFFEXOR) 37.5 MG tablet Take 1 tablet (37.5 mg) by mouth 2 times daily     warfarin ANTICOAGULANT (COUMADIN) 5 MG tablet Take 1.5 tablets (7.5 mg) on Fridays and take 1 tablet (5 mg) all other days or as directed by the INR clinic     No current facility-administered medications for this visit.      Vitals:  No vitals obtained    Exam:  GEN: Pleasant male in NAD  RESP: Breathing is non labored. Speaking in full sentences  NEURO: A/O  PSYCH: Bright affect, engaging     LABS:   CMP  Recent Labs   Lab Test 05/23/22  1356 05/03/22  1656 03/29/22  1059 03/27/22  1600 08/31/21  1401 06/21/21  1454 06/14/21  1455 04/21/21  0931 03/02/21  1405    138 136 133   < > 138 137 138 136   POTASSIUM 3.6 4.0 4.7 3.9   < > 4.1 4.7 3.7 4.5   CHLORIDE 99 101 99 96   < > 103 105 99 104   CO2 35* 34* 31 32   < > 29 28 39* 28   ANIONGAP 5 3 6 5   < > 6 4 <1* 4   GLC 97 90 106* 113*   < > 100* 108* 104* 93   BUN 45* 47* 41* 54*   < >  64* 62* 44* 57*   CR 1.16 1.21 1.39* 1.69*   < > 1.57* 1.81* 1.02 1.42*   GFRESTIMATED 62 60* 51* 40*   < > 40* 34* 68 46*   GFRESTBLACK  --   --   --   --   --  47* 39* 79 53*   LAWSON 9.4 9.5 9.7 8.8   < > 9.4 9.2 9.2 9.6    < > = values in this interval not displayed.     Recent Labs   Lab Test 05/23/22  1356 05/03/22  1656 03/27/22  1600 03/25/22  2014 10/20/21  1022   BILITOTAL 0.5 0.4 0.4 0.6 0.4   ALKPHOS 99 109 90 85 108   ALT  --  23 22 26 20   AST 16 17 14 13 11     CBC  Recent Labs   Lab Test 05/23/22  1356 05/03/22  1656 03/29/22  1059 03/27/22 1600 03/25/22 2014   HGB 12.5* 12.8* 12.7* 11.7* 12.6*   WBC  --  11.2* 11.7* 10.2 12.2*   RBC  --  3.95* 3.97* 3.79* 3.87*   HCT  --  40.1 40.6 37.9* 39.1*   MCV  --  102* 102* 100 101*   MCH  --  32.4 32.0 30.9 32.6   MCHC  --  31.9 31.3* 30.9* 32.2   RDW  --  16.4* 16.2* 15.7* 15.8*   PLT  --  251 194 177 168     URINE STUDIES  Recent Labs   Lab Test 03/25/22  1855 06/14/21  1516 04/21/21  0853 12/21/20  1219 12/14/20  1706 08/11/20  1344   COLOR Yellow Yellow Yellow Yellow Yellow Yellow   APPEARANCE Clear Cloudy Clear Clear Clear Clear   URINEGLC Negative Negative Negative Negative Negative Negative   URINEBILI Negative Negative Negative Negative Negative Negative   URINEKETONE Negative Negative Negative Negative Negative Negative   SG 1.015 1.020 1.015 1.015 1.015 1.020   UBLD Negative Small* Negative Negative Trace* Negative   URINEPH 5.5 5.0 7.0 5.0 6.0 6.5   PROTEIN Negative 30* Negative Negative 30* Negative   UROBILINOGEN 0.2 0.2 0.2 0.2 0.2 0.2   NITRITE Negative Negative Negative Negative Negative Negative   LEUKEST Negative Moderate* Negative Small* Small* Small*   RBCU  --  Unable to quantitate other elements due to packed WBC's. *  --  O - 2 2-5* 2-5*   WBCU  --  >100*  --  10-25* 25-50* 25-50*     Recent Labs   Lab Test 05/23/22  1356 02/04/22  0902 10/28/21  1118 06/14/21  1516   UTPG 0.18 0.29* 0.44* 0.38*     PTH  Recent Labs   Lab Test  05/23/22  1356 06/14/21  1455   PTHI 92* 107*     IRON STUDIES  Recent Labs   Lab Test 10/28/21  1118 10/20/21  1022 02/19/21  1528   IRON 60  --   --      --   --    IRONSAT 19  --   --    SEGUNDO 81 78 97       Tiffany Suero, NP

## 2022-06-29 ENCOUNTER — TELEPHONE (OUTPATIENT)
Dept: NEPHROLOGY | Facility: CLINIC | Age: 82
End: 2022-06-29

## 2022-07-07 ENCOUNTER — TELEPHONE (OUTPATIENT)
Dept: FAMILY MEDICINE | Facility: CLINIC | Age: 82
End: 2022-07-07

## 2022-07-07 NOTE — TELEPHONE ENCOUNTER
Kaya, Account Exective [facility]  Calling to clarify Dr. Heath's NPI Number. Per Kaya, NPI number is listed for [place], not [place]. Kaya also unable to locate providers [place] License number.   Writer confirmed NPI number:  [id number]  Which is what Kaya has.     Writer huddled with clinic manager who will follow up on credentials.   Routing to triage for follow up with Kaya.     Callback: [phone]- ok to leave detailed VM.        Georgiana Jennings RN  [facility]

## 2022-07-08 ENCOUNTER — MEDICAL CORRESPONDENCE (OUTPATIENT)
Dept: FAMILY MEDICINE | Facility: CLINIC | Age: 82
End: 2022-07-08

## 2022-07-11 ENCOUNTER — TELEPHONE (OUTPATIENT)
Dept: FAMILY MEDICINE | Facility: CLINIC | Age: 82
End: 2022-07-11

## 2022-07-11 DIAGNOSIS — I82.4Z9 DEEP VEIN THROMBOSIS (DVT) OF DISTAL VEIN OF LOWER EXTREMITY, UNSPECIFIED CHRONICITY, UNSPECIFIED LATERALITY (H): ICD-10-CM

## 2022-07-11 DIAGNOSIS — I48.0 PAROXYSMAL A-FIB (H): ICD-10-CM

## 2022-07-11 DIAGNOSIS — Z79.01 LONG TERM CURRENT USE OF ANTICOAGULANTS WITH INR GOAL OF 2.0-3.0: ICD-10-CM

## 2022-07-11 DIAGNOSIS — I26.99 PE (PULMONARY THROMBOEMBOLISM) (H): Primary | ICD-10-CM

## 2022-07-11 DIAGNOSIS — I48.91 ATRIAL FIBRILLATION (H): ICD-10-CM

## 2022-07-11 RX ORDER — WARFARIN SODIUM 5 MG/1
TABLET ORAL
Qty: 90 TABLET | Refills: 1 | Status: SHIPPED | OUTPATIENT
Start: 2022-07-11 | End: 2022-07-11

## 2022-07-11 RX ORDER — WARFARIN SODIUM 5 MG/1
TABLET ORAL
Qty: 90 TABLET | Refills: 1 | Status: SHIPPED | OUTPATIENT
Start: 2022-07-11 | End: 2022-08-23

## 2022-07-11 NOTE — TELEPHONE ENCOUNTER
Patient called [facility] where he lives and has one of their nurses seeing him. We can call homecare RN to provider verbal order for [phone], MARIA DE JESUS Lange (male).    Called for Nazario who was not available. Left message to have him return call to this ACC RN.    Patient states he can't find his warfarin since coming home from hospital so has not taken any warfarin since then. He will need new prescription sent. Advised patient when he start it today, he should take 10 mg booster dose and then continue with 5 mg daily, same as prior to surgery. He should continue with daily Lovenox injections until INR is >2. Patient stated understanding and is in agreement with plan.    Paola BONDS RN  Anticoagulation Team

## 2022-07-11 NOTE — TELEPHONE ENCOUNTER
The Home Care/Assisted Living/Nursing Facility is calling regarding an established patient.  Has the patient seen Home Care in the past or is currently residing in Assisted Living or Nursing Facility? Yes.     Rosemarie calling from [facility] requesting the following orders that are within the Home Care, Assisted Living or Nursing Home Eval and Treatment standing order and can be signed as standing order signature required by RN.    Preferred Call Back Number: [phone] ok to leave detailed VM    PT/OT/Speech Therapy Starting 7/8/22: 1visit/week x 2w, 2visit/week x4w, 1visit/week x 2     Any additional Orders:  Are there any orders requested, not stated above, that are outside of the standing order and must be routed to a licensed practitioner for approval?    No    Writer has verified [facility] will send fax to have orders signed. home     Juliana GALAVIZ RN  EP Triage

## 2022-07-11 NOTE — TELEPHONE ENCOUNTER
Verbal approval orders given:     Skilled nursing services 1 x a week for 4_ weeks     Also requesting vital sign orders with parameters, med review.

## 2022-07-11 NOTE — TELEPHONE ENCOUNTER
Per [facility] note 7/5/22, patient advised to stop warfarin 7/1/22. Right total shoulder arthroplasty 7/6/22. Patient was advised to take 40 mg Lovenox subcutaneously, every 24 hours, to start 24 hours after surgery or per surgeon's recommendations, to continue until INR >2 x1.    Called patient, left detailed vm to continue with daily Lovenox injections as well as 5 mg of warfarin daily. Needs to continue Lovenox bridging until INR is >2.0. He can either schedule visit to clinic lab or if he has a homecare RN coming in, they can also check the INR.    Advised he call back to discuss at [phone].    Paola BONDS RN  Anticoagulation Team

## 2022-07-11 NOTE — TELEPHONE ENCOUNTER
Reason for Call:  INR    Who is calling?  Patient    Phone number:  [phone]    Fax number:  N/A    Name of caller: Jaun Craven    INR Value:  N/A    Are there any other concerns:PT just discharged from hospital and wanted to know if still should keep taking the injectable dosage.  PT also wanted to know if okay if the Home Care nurse could draw PT's INR     Can we leave a detailed message on this number? YES    Phone number patient can be reached at: Home number on file [phone] (home)      Call taken on 7/11/2022 at 8:57 AM by Clara Young

## 2022-07-12 NOTE — TELEPHONE ENCOUNTER
Called [facility] again to try and speak with Nazario. They will pass along another message that Jaun is continuing with Lovenox injections and just started warfarin back up 7/11/22. He will need INR either on 7/15 or 7/18. They took the message and will ask [facility] to call back to confirm INR date.    Paola BONDS RN  Anticoagulation Team

## 2022-07-12 NOTE — TELEPHONE ENCOUNTER
Reason for Call:  Other call back    Detailed comments: Nazario returned the phone call. No answer at the ph# left. He left a direct ph# for call back.    Phone Number Patient can be reached at: Other phone number:  [phone]    Best Time: any    Can we leave a detailed message on this number? YES    Call taken on 7/12/2022 at 1:10 PM by Torrie Morgan

## 2022-07-12 NOTE — TELEPHONE ENCOUNTER
Tried calling Nazario back at his direct line, but no answer and mailbox is full. Will need to try again later.  Paola BONDS RN  Anticoagulation Team

## 2022-07-13 NOTE — TELEPHONE ENCOUNTER
Writer called Kaya back to to verify if they were still having issue with Dr. Heath's NPI number.Per chart review verbal orders have been requested and given.  Left message to return call to Triage RN.     Attempt #: 1     Was call answered?  No.  Left detailed message on voicemail to callback and clarify information above and gave information to call cllnic back.     Georgiana Jennings RN  [facility]

## 2022-07-13 NOTE — TELEPHONE ENCOUNTER
This writer was finally able to reach [facility] RN, who said he would be able to do an INR POC on Monday 7/18/22 when he goes to see the patient.

## 2022-07-14 ENCOUNTER — HOSPITAL ENCOUNTER (EMERGENCY)
Facility: CLINIC | Age: 82
Discharge: HOME | End: 2022-07-15
Attending: EMERGENCY MEDICINE | Admitting: EMERGENCY MEDICINE
Payer: MEDICARE

## 2022-07-14 DIAGNOSIS — K57.32 DIVERTICULITIS OF COLON: ICD-10-CM

## 2022-07-14 LAB
BASOPHILS # BLD AUTO: 0.1 10E3/UL (ref 0–0.2)
BASOPHILS NFR BLD AUTO: 0 %
EOSINOPHIL # BLD AUTO: 0.2 10E3/UL (ref 0–0.7)
EOSINOPHIL NFR BLD AUTO: 1 %
ERYTHROCYTE [DISTWIDTH] IN BLOOD BY AUTOMATED COUNT: 15.1 % (ref 10–15)
HCT VFR BLD AUTO: 36 % (ref 40–53)
HGB BLD-MCNC: 11.5 G/DL (ref 13.3–17.7)
IMM GRANULOCYTES # BLD: 0.2 10E3/UL
IMM GRANULOCYTES NFR BLD: 1 %
INR PPP: 2.96 (ref 0.85–1.15)
LYMPHOCYTES # BLD AUTO: 1.9 10E3/UL (ref 0.8–5.3)
LYMPHOCYTES NFR BLD AUTO: 10 %
MCH RBC QN AUTO: 30.7 PG (ref 26.5–33)
MCHC RBC AUTO-ENTMCNC: 31.9 G/DL (ref 31.5–36.5)
MCV RBC AUTO: 96 FL (ref 78–100)
MONOCYTES # BLD AUTO: 1.3 10E3/UL (ref 0–1.3)
MONOCYTES NFR BLD AUTO: 7 %
NEUTROPHILS # BLD AUTO: 15.2 10E3/UL (ref 1.6–8.3)
NEUTROPHILS NFR BLD AUTO: 81 %
NRBC # BLD AUTO: 0 10E3/UL
NRBC BLD AUTO-RTO: 0 /100
PLATELET # BLD AUTO: 283 10E3/UL (ref 150–450)
RBC # BLD AUTO: 3.74 10E6/UL (ref 4.4–5.9)
WBC # BLD AUTO: 18.9 10E3/UL (ref 4–11)

## 2022-07-14 PROCEDURE — 82947 ASSAY GLUCOSE BLOOD QUANT: CPT | Performed by: EMERGENCY MEDICINE

## 2022-07-14 PROCEDURE — 85025 COMPLETE CBC W/AUTO DIFF WBC: CPT | Performed by: EMERGENCY MEDICINE

## 2022-07-14 PROCEDURE — 99285 EMERGENCY DEPT VISIT HI MDM: CPT | Mod: 25

## 2022-07-14 PROCEDURE — 258N000003 HC RX IP 258 OP 636: Performed by: EMERGENCY MEDICINE

## 2022-07-14 PROCEDURE — 36415 COLL VENOUS BLD VENIPUNCTURE: CPT | Performed by: EMERGENCY MEDICINE

## 2022-07-14 PROCEDURE — 96374 THER/PROPH/DIAG INJ IV PUSH: CPT | Mod: 59

## 2022-07-14 PROCEDURE — 96375 TX/PRO/DX INJ NEW DRUG ADDON: CPT

## 2022-07-14 PROCEDURE — 83690 ASSAY OF LIPASE: CPT | Performed by: EMERGENCY MEDICINE

## 2022-07-14 PROCEDURE — 85610 PROTHROMBIN TIME: CPT | Performed by: EMERGENCY MEDICINE

## 2022-07-14 PROCEDURE — 96361 HYDRATE IV INFUSION ADD-ON: CPT

## 2022-07-14 RX ORDER — FENTANYL CITRATE 50 UG/ML
50 INJECTION, SOLUTION INTRAMUSCULAR; INTRAVENOUS
Status: DISCONTINUED | OUTPATIENT
Start: 2022-07-14 | End: 2022-07-15 | Stop reason: HOSPADM

## 2022-07-14 RX ADMIN — SODIUM CHLORIDE 500 ML: 9 INJECTION, SOLUTION INTRAVENOUS at 23:50

## 2022-07-14 ASSESSMENT — ENCOUNTER SYMPTOMS
FEVER: 0
NAUSEA: 1
ABDOMINAL PAIN: 1

## 2022-07-15 ENCOUNTER — APPOINTMENT (OUTPATIENT)
Dept: CT IMAGING | Facility: CLINIC | Age: 82
End: 2022-07-15
Attending: EMERGENCY MEDICINE
Payer: MEDICARE

## 2022-07-15 ENCOUNTER — PATIENT OUTREACH (OUTPATIENT)
Dept: FAMILY MEDICINE | Facility: CLINIC | Age: 82
End: 2022-07-15

## 2022-07-15 ENCOUNTER — TELEPHONE (OUTPATIENT)
Dept: FAMILY MEDICINE | Facility: CLINIC | Age: 82
End: 2022-07-15

## 2022-07-15 VITALS
DIASTOLIC BLOOD PRESSURE: 98 MMHG | RESPIRATION RATE: 17 BRPM | HEART RATE: 73 BPM | TEMPERATURE: 97.8 F | OXYGEN SATURATION: 96 % | SYSTOLIC BLOOD PRESSURE: 129 MMHG

## 2022-07-15 DIAGNOSIS — I48.0 PAROXYSMAL A-FIB (H): ICD-10-CM

## 2022-07-15 DIAGNOSIS — I26.99 PE (PULMONARY THROMBOEMBOLISM) (H): Primary | ICD-10-CM

## 2022-07-15 DIAGNOSIS — I82.4Z9 DEEP VEIN THROMBOSIS (DVT) OF DISTAL VEIN OF LOWER EXTREMITY, UNSPECIFIED CHRONICITY, UNSPECIFIED LATERALITY (H): ICD-10-CM

## 2022-07-15 DIAGNOSIS — Z79.01 LONG TERM CURRENT USE OF ANTICOAGULANTS WITH INR GOAL OF 2.0-3.0: ICD-10-CM

## 2022-07-15 LAB
ALBUMIN SERPL-MCNC: 2.9 G/DL (ref 3.4–5)
ALBUMIN UR-MCNC: NEGATIVE MG/DL
ALP SERPL-CCNC: 99 U/L (ref 40–150)
ALT SERPL W P-5'-P-CCNC: 19 U/L (ref 0–70)
ANION GAP SERPL CALCULATED.3IONS-SCNC: 8 MMOL/L (ref 3–14)
APPEARANCE UR: CLEAR
AST SERPL W P-5'-P-CCNC: 27 U/L (ref 0–45)
BILIRUB SERPL-MCNC: 0.4 MG/DL (ref 0.2–1.3)
BILIRUB UR QL STRIP: NEGATIVE
BUN SERPL-MCNC: 43 MG/DL (ref 7–30)
CALCIUM SERPL-MCNC: 8.7 MG/DL (ref 8.5–10.1)
CHLORIDE BLD-SCNC: 95 MMOL/L (ref 94–109)
CO2 SERPL-SCNC: 31 MMOL/L (ref 20–32)
COLOR UR AUTO: YELLOW
CREAT SERPL-MCNC: 1.59 MG/DL (ref 0.66–1.25)
GFR SERPL CREATININE-BSD FRML MDRD: 43 ML/MIN/1.73M2
GLUCOSE BLD-MCNC: 134 MG/DL (ref 70–99)
GLUCOSE UR STRIP-MCNC: NEGATIVE MG/DL
HCO3 BLDV-SCNC: 33 MMOL/L (ref 21–28)
HGB UR QL STRIP: NEGATIVE
HYALINE CASTS: 42 /LPF
KETONES UR STRIP-MCNC: NEGATIVE MG/DL
LACTATE BLD-SCNC: 0.6 MMOL/L
LEUKOCYTE ESTERASE UR QL STRIP: NEGATIVE
LIPASE SERPL-CCNC: 61 U/L (ref 73–393)
MUCOUS THREADS #/AREA URNS LPF: PRESENT /LPF
NITRATE UR QL: NEGATIVE
PCO2 BLDV: 54 MM HG (ref 40–50)
PH BLDV: 7.39 [PH] (ref 7.32–7.43)
PH UR STRIP: 6.5 [PH] (ref 5–7)
PO2 BLDV: 22 MM HG (ref 25–47)
POTASSIUM BLD-SCNC: 3.7 MMOL/L (ref 3.4–5.3)
PROT SERPL-MCNC: 6.8 G/DL (ref 6.8–8.8)
RBC URINE: 1 /HPF
SAO2 % BLDV: 36 % (ref 94–100)
SODIUM SERPL-SCNC: 134 MMOL/L (ref 133–144)
SP GR UR STRIP: 1.02 (ref 1–1.03)
UROBILINOGEN UR STRIP-MCNC: NORMAL MG/DL
WBC URINE: 1 /HPF

## 2022-07-15 PROCEDURE — 83605 ASSAY OF LACTIC ACID: CPT

## 2022-07-15 PROCEDURE — 250N000009 HC RX 250: Performed by: EMERGENCY MEDICINE

## 2022-07-15 PROCEDURE — 250N000011 HC RX IP 250 OP 636: Performed by: EMERGENCY MEDICINE

## 2022-07-15 PROCEDURE — 81001 URINALYSIS AUTO W/SCOPE: CPT | Performed by: EMERGENCY MEDICINE

## 2022-07-15 PROCEDURE — G1010 CDSM STANSON: HCPCS

## 2022-07-15 PROCEDURE — 250N000013 HC RX MED GY IP 250 OP 250 PS 637: Performed by: EMERGENCY MEDICINE

## 2022-07-15 RX ORDER — CIPROFLOXACIN 500 MG/1
500 TABLET, FILM COATED ORAL ONCE
Status: COMPLETED | OUTPATIENT
Start: 2022-07-15 | End: 2022-07-15

## 2022-07-15 RX ORDER — METRONIDAZOLE 500 MG/1
500 TABLET ORAL 3 TIMES DAILY
Qty: 30 TABLET | Refills: 0 | Status: SHIPPED | OUTPATIENT
Start: 2022-07-15 | End: 2022-08-01

## 2022-07-15 RX ORDER — METRONIDAZOLE 500 MG/1
500 TABLET ORAL ONCE
Status: COMPLETED | OUTPATIENT
Start: 2022-07-15 | End: 2022-07-15

## 2022-07-15 RX ORDER — IOPAMIDOL 755 MG/ML
132 INJECTION, SOLUTION INTRAVASCULAR ONCE
Status: COMPLETED | OUTPATIENT
Start: 2022-07-15 | End: 2022-07-15

## 2022-07-15 RX ORDER — ONDANSETRON 4 MG/1
4 TABLET, ORALLY DISINTEGRATING ORAL EVERY 6 HOURS PRN
Qty: 10 TABLET | Refills: 0 | Status: SHIPPED | OUTPATIENT
Start: 2022-07-15 | End: 2022-07-18

## 2022-07-15 RX ORDER — HYDROMORPHONE HYDROCHLORIDE 2 MG/1
2 TABLET ORAL EVERY 6 HOURS PRN
Qty: 10 TABLET | Refills: 0 | Status: SHIPPED | OUTPATIENT
Start: 2022-07-15 | End: 2022-07-18

## 2022-07-15 RX ORDER — HYDROMORPHONE HYDROCHLORIDE 1 MG/ML
0.5 INJECTION, SOLUTION INTRAMUSCULAR; INTRAVENOUS; SUBCUTANEOUS
Status: DISCONTINUED | OUTPATIENT
Start: 2022-07-15 | End: 2022-07-15 | Stop reason: HOSPADM

## 2022-07-15 RX ORDER — CIPROFLOXACIN 500 MG/1
500 TABLET, FILM COATED ORAL 2 TIMES DAILY
Qty: 20 TABLET | Refills: 0 | Status: SHIPPED | OUTPATIENT
Start: 2022-07-15 | End: 2022-08-01

## 2022-07-15 RX ADMIN — HYDROMORPHONE HYDROCHLORIDE 0.5 MG: 1 INJECTION, SOLUTION INTRAMUSCULAR; INTRAVENOUS; SUBCUTANEOUS at 01:33

## 2022-07-15 RX ADMIN — METRONIDAZOLE 500 MG: 500 TABLET ORAL at 01:38

## 2022-07-15 RX ADMIN — IOPAMIDOL 132 ML: 755 INJECTION, SOLUTION INTRAVENOUS at 00:31

## 2022-07-15 RX ADMIN — CIPROFLOXACIN HYDROCHLORIDE 500 MG: 500 TABLET, FILM COATED ORAL at 01:38

## 2022-07-15 RX ADMIN — FENTANYL CITRATE 50 MCG: 50 INJECTION, SOLUTION INTRAMUSCULAR; INTRAVENOUS at 00:01

## 2022-07-15 RX ADMIN — SODIUM CHLORIDE 78 ML: 9 INJECTION, SOLUTION INTRAVENOUS at 00:31

## 2022-07-15 NOTE — TELEPHONE ENCOUNTER
ANTICOAGULATION  MANAGEMENT: Discharge Review    Erich Craven chart reviewed for anticoagulation continuity of care    Emergency room visit on 7/14/22 for Abdominal pain.    Discharge disposition: Home with Home Care - lives at [facility]    Results:    Recent labs: (last 7 days)     07/14/22  2330   INR 2.96*     Anticoagulation inpatient management:     not applicable     Anticoagulation discharge instructions:      Warfarin dosing: home regimen continued   Bridging: No   INR goal change: No      Medication changes affecting anticoagulation: Yes: 10 day course of Metronidazole and Cipro     Additional factors affecting anticoagulation: Yes: patient will requite an empiric warfarin maintenance dose reduction of 15-25% + recent shoulder surgery 7/6/22     PLAN     Recommend to adjust dose to 27.5 mg weekly (2.5 mg Sun, Tue, Fri + 5 mg AOD)    Left a detailed message for Jaun    Anticoagulation Calendar updated    Nicole Gaytan RN      ANTICOAGULATION  MANAGEMENT     Interacting Medication Review    Interacting medication(s): Ciprofloxacin (Cipro) and Metronidazole (Flagyl) with warfarin.    Duration: 10 days  (7/15/22 to 7/25/22)    Indication: Diverticulitis    New medication?: Yes, interaction may increase INR and risk of bleeding. With Metronidazole (oral/IV), [facility] protocol Appendix G recommends empiric reduction of warfarin dose in therapeutic/supratherapeutic patients.        PLAN     Temporarily adjust warfarin dose during interaction (21.4% change) with next INR in 3 days      Per chart review, ACRN already arranged HC visit for 7/18/22 with [facility]  Summary  As of 7/15/2022    Full warfarin instructions:  2.5 mg every Sun, Tue, Fri; 5 mg all other days   Next INR check:  7/18/2022             Telephone call with  Sridevi who agrees to plan and repeated back plan correctly    Maintenance dose modified on anticoagulation calendar for interaction > 7 days; maintenance dose to be  readjusted post interaction    Plan made with [facility] Pharmacist Rohini Gaytan, RN  Anticoagulation Clinic

## 2022-07-15 NOTE — TELEPHONE ENCOUNTER
What type of discharge? Emergency Department  Risk of Hospital admission or ED visit: 83%  Is a TCM episode required? No  When should the patient follow up with PCP? within 30 days of discharge.    Georgiana Jennings RN  [facility]

## 2022-07-15 NOTE — TELEPHONE ENCOUNTER
Writer contacted patient again and advised new dosing plan during antimicrobial treatment. Pt verbalized understanding and agrees to plan. Aware of HC visit and INR check 7/18/22. Nicole Gaytan, NELAN, RN

## 2022-07-15 NOTE — TELEPHONE ENCOUNTER
Patient called back with daughter    Patient and daughter do not feel like patient is safe to be at home    Patient still having stomach pain and still recovering from shoulder pain     instructed daughter and patient that he should present back to ER as pcp is not in office today and can not do a direct admit.   They will go back instructed they can ask to speak to social work as well as TCU placement could be helpful    Griffin Vivar RN

## 2022-07-15 NOTE — ED PROVIDER NOTES
History   Chief Complaint:  Abdominal Pain       The history is provided by the patient and the EMS personnel.      Erich Craven is a 82 year old male on Warfarin with history of COPD, PE, DVT, hypertension, CHF RIN, hypothyroidism, paroxysmal a-fib, hyperlipidemia, HFpEF, CAD, type 2 diabetes mellitus, and CKD who presents via EMS from his daughter's home with severe and sharp lower abdominal pain, which he rates at a 10/10, since onset 8 hours ago. Additionally, he experiences slight nausea.  He has been on Oxycodone since 8 days ago after a shoulder surgery, with limited BMs since.  He originally was concerned that this is causing his symptoms but despite taking Miralax yesterday with a small subsequent bm, the pain persists and is worsening.  The pt denies urinary sxs, fever.    Review of Systems   Constitutional: Negative for fever.   Gastrointestinal: Positive for abdominal pain (lower, ozzie) and nausea.   All other systems reviewed and are negative.    Allergies:  No Known Allergies    Medications:  Atorvastatin   Carvedilol   Diltiazem   Furosemide  Levothyroxine   Lisinopril   Metolazone   Spironolactone   Tamsulosin  Tramadol  Venlafaxine   Warfarin   Ondansetron  Oxycodone     Past Medical History:     Morbid obesity  PE  Hypertension  CHF  RIN  COPD  Hypothyroidism  Paroxysmal atrial fibrillation   Hyperlipidemia   Lymphedema  HFpEF  DVT  CAD  Anxiety  Type 2 diabetes mellitus  CKD  Benign neoplasm of cerebral meninges    thromboembalism    Past Surgical History:    Cardiac stenting  Right heart cath  Tonsillectomy  Implant pacemaker  Inject nerve block suprascapular x2  Nerve block peripheral  Knee replacement   Cataract bilateral  Arthroplasty replacement total shoulder    Family History:    Hypertension  Stroke     Social History:  The patient presents via EMS.  The patient presents alone.  PCP: Anne Heath MD     Physical Exam     Patient Vitals for the past 24 hrs:   BP Temp Pulse  Resp SpO2   07/15/22 0130 (!) 129/98 -- 73 17 96 %   07/15/22 0100 127/67 -- 66 16 95 %   07/15/22 0045 122/62 -- 69 17 96 %   07/15/22 0035 128/65 -- 71 19 94 %   07/15/22 0000 117/58 -- 72 30 90 %   07/14/22 2350 -- -- 70 21 93 %   07/14/22 2346 94/48 97.8  F (36.6  C) 64 20 96 %   07/14/22 2345 94/48 -- 70 22 91 %   07/14/22 2340 97/50 -- 65 15 (!) 89 %       Physical Exam  General/Appearance: appears stated age, well-groomed, appears comfortable  Eyes: EOMI, no scleral injection, no icterus  ENT: MMM  Neck: supple, nl ROM, no stiffness  Cardiovascular: RRR, nl S1S2, no m/r/g, 2+ pulses in all 4 extremities, cap refill <2sec  Respiratory: CTAB, good air movement throughout, no wheezes/rhonchi/rales, no increased WOB, no retractions  GI: abd soft, non-distended, moderate abd ttp diffusely,  no HSM, no rebound, no guarding, nl BS  MSK: LEE, good tone, no bony abnormality  Skin: warm and well-perfused, no rash, no edema, no ecchymosis, nl turgor  Neuro: GCS 15, alert and oriented, no gross focal neuro deficits  Psych: interacts appropriately  Heme: no petechia, no purpura, no active bleeding    Emergency Department Course     Imaging:  CT Abdomen Pelvis w Contrast   Final Result   IMPRESSION:    1.  Extensive colonic diverticulosis. Moderate length segmental area of mild wall thickening in the sigmoid colon left lower quadrant with some surrounding inflammatory stranding and edema. Findings are most suggestive of acute diverticulitis. Segmental    colitis could also give this appearance and would also be a possibility. Clinical correlation. No evidence for abscess, free air, or bowel obstruction. Given the segmental area of wall thickening, recommend follow-up to monitor for resolution and exclude    any underlying lesions. This could be done with colonoscopy or barium enema.      2.  No acute findings elsewhere.      3.  Moderate to large amounts of stool in the colon.      4.  Mild prostatic enlargement.         Report per radiology    Laboratory:  Labs Ordered and Resulted from Time of ED Arrival to Time of ED Departure   COMPREHENSIVE METABOLIC PANEL - Abnormal       Result Value    Sodium 134      Potassium 3.7      Chloride 95      Carbon Dioxide (CO2) 31      Anion Gap 8      Urea Nitrogen 43 (*)     Creatinine 1.59 (*)     Calcium 8.7      Glucose 134 (*)     Alkaline Phosphatase 99      AST 27      ALT 19      Protein Total 6.8      Albumin 2.9 (*)     Bilirubin Total 0.4      GFR Estimate 43 (*)    LIPASE - Abnormal    Lipase 61 (*)    CBC WITH PLATELETS AND DIFFERENTIAL - Abnormal    WBC Count 18.9 (*)     RBC Count 3.74 (*)     Hemoglobin 11.5 (*)     Hematocrit 36.0 (*)     MCV 96      MCH 30.7      MCHC 31.9      RDW 15.1 (*)     Platelet Count 283      % Neutrophils 81      % Lymphocytes 10      % Monocytes 7      % Eosinophils 1      % Basophils 0      % Immature Granulocytes 1      NRBCs per 100 WBC 0      Absolute Neutrophils 15.2 (*)     Absolute Lymphocytes 1.9      Absolute Monocytes 1.3      Absolute Eosinophils 0.2      Absolute Basophils 0.1      Absolute Immature Granulocytes 0.2      Absolute NRBCs 0.0     INR - Abnormal    INR 2.96 (*)    ROUTINE UA WITH MICROSCOPIC REFLEX TO CULTURE - Abnormal    Color Urine Yellow      Appearance Urine Clear      Glucose Urine Negative      Bilirubin Urine Negative      Ketones Urine Negative      Specific Gravity Urine 1.022      Blood Urine Negative      pH Urine 6.5      Protein Albumin Urine Negative      Urobilinogen Urine Normal      Nitrite Urine Negative      Leukocyte Esterase Urine Negative      Mucus Urine Present (*)     RBC Urine 1      WBC Urine 1      Hyaline Casts Urine 42 (*)    ISTAT GASES LACTATE VENOUS POCT - Abnormal    Lactic Acid POCT 0.6      Bicarbonate Venous POCT 33 (*)     O2 Sat, Venous POCT 36 (*)     pCO2V Venous POCT 54 (*)     pH Venous POCT 7.39      pO2 Venous POCT 22 (*)    LACTIC ACID WHOLE BLOOD      Emergency Department  Course:    Reviewed:  I reviewed nursing notes, vitals, past medical history and Care Everywhere    Assessments:  1118 I obtained history and examined the patient as noted above.   0125 I rechecked the patient and explained findings.       Interventions:  2350 NS 1 L IV  0001 Fentanyl 50 mcg IV  0133 Hydromorphone 0.5 mg PO  0138 Ciprofloxacin 500 mg PO  0138 Metronidazole 500 mg PO      Disposition:  The patient was discharged to home.     Impression & Plan     Medical Decision Making:  This pt presented with abdominal pain and CT confirmation of diverticulitis without abscess or perforation; this appears consistent with the history and physical exam so I doubt another underlying etiology is also present.  The patient's pain has been controlled by interventions in the emergency department.  This represents uncomplicated diverticulitis at this time as there are no signs of sepsis, shock or bacteremia.  The patient is tolerating po and pain is controlled and I believe safe for outpatient treatment.  They have been started on Flagyl and Cipro -- they will be d/c'd with Rx's for these, pain meds, and nausea meds.  I educated the patient regarding the symptoms and signs that should prompt return to the Emergency Department.  This would include worsening fevers, chills, vomiting, and more intense pain.  The patient is to take antibiotics and pain medications as directed.    Follow-up with primary care physician is indicated in 1-2 days and again after abx are done to help arrange for a colonoscopy to ensure there are no underlying lesions in the gi system (pt and family member aware of need for this).   Diagnosis:    ICD-10-CM    1. Diverticulitis of colon  K57.32        Discharge Medications:  Discharge Medication List as of 7/15/2022  1:49 AM      START taking these medications    Details   ciprofloxacin (CIPRO) 500 MG tablet Take 1 tablet (500 mg) by mouth 2 times daily for 10 days, Disp-20 tablet, R-0, Local Print       HYDROmorphone (DILAUDID) 2 MG tablet Take 1 tablet (2 mg) by mouth every 6 hours as needed for pain, Disp-10 tablet, R-0, Local Print      metroNIDAZOLE (FLAGYL) 500 MG tablet Take 1 tablet (500 mg) by mouth 3 times daily for 10 days, Disp-30 tablet, R-0, Local Print      ondansetron (ZOFRAN ODT) 4 MG ODT tab Take 1 tablet (4 mg) by mouth every 6 hours as needed for nausea, Disp-10 tablet, R-0, Local Print             Scribe Disclosure:  I, Liliana Hernandez, am serving as a scribe at 11:21 PM on 7/14/2022 to document services personally performed by Sarah Kerr MD based on my observations and the provider's statements to me.          Sarah Kerr MD  07/15/22 8137

## 2022-07-15 NOTE — ED TRIAGE NOTES
Patient started having lower abd pain starting around 1500. Patient has been taking oxycodone for a shoulder surgery, but is also taking miralax and had a large bowel movement this evening. Abd pain is increasing. 9/10     Triage Assessment     Row Name 07/14/22 0502       Respiratory WDL    Respiratory WDL WDL       Skin Circulation/Temperature WDL    Skin Circulation/Temperature WDL WDL       Cardiac WDL    Cardiac WDL WDL       Peripheral/Neurovascular WDL    Peripheral Neurovascular WDL WDL       Cognitive/Neuro/Behavioral WDL    Cognitive/Neuro/Behavioral WDL WDL

## 2022-07-15 NOTE — TELEPHONE ENCOUNTER
Reason for Call:  Other call back    Detailed comments: Patient was in the ER last night for stomach pain. The patient is looking to if PCP has some options for treatment. Patient has had this issue before but patient is still looking to see alternative treatments.    Phone Number Patient can be reached at: Cell number on file:    Telephone Information:   Mobile [phone]       Best Time: ANY    Can we leave a detailed message on this number? YES    Call taken on 7/15/2022 at 3:35 PM by Elaine Goetz

## 2022-07-18 ENCOUNTER — ANTICOAGULATION THERAPY VISIT (OUTPATIENT)
Dept: ANTICOAGULATION | Facility: CLINIC | Age: 82
End: 2022-07-18

## 2022-07-18 DIAGNOSIS — I26.99 PE (PULMONARY THROMBOEMBOLISM) (H): Primary | ICD-10-CM

## 2022-07-18 DIAGNOSIS — Z79.01 LONG TERM CURRENT USE OF ANTICOAGULANTS WITH INR GOAL OF 2.0-3.0: ICD-10-CM

## 2022-07-18 DIAGNOSIS — I82.4Z9 DEEP VEIN THROMBOSIS (DVT) OF DISTAL VEIN OF LOWER EXTREMITY, UNSPECIFIED CHRONICITY, UNSPECIFIED LATERALITY (H): ICD-10-CM

## 2022-07-18 DIAGNOSIS — I48.0 PAROXYSMAL A-FIB (H): ICD-10-CM

## 2022-07-18 LAB — INR (EXTERNAL): 7.5 (ref 0.9–1.1)

## 2022-07-18 NOTE — PROGRESS NOTES
ANTICOAGULATION MANAGEMENT     Erich Craven 82 year old male is on warfarin with supratherapeutic INR result. (Goal INR 2.0-3.0)    Recent labs: (last 7 days)     07/18/22  1202   INR 7.5*       ASSESSMENT       Source(s): Chart Review and Home Care/Facility Nurse       Warfarin doses taken: Warfarin taken as instructed    Diet: Decreased greens/vitamin K in diet; plans to resume previous intake    New illness, injury, or hospitalization: Yes: Diverticulitis    Medication/supplement changes: cipro started on 07/15 which has potential for interaction; increasing INR    Metronidazole 10 day course (dates: 07/15 to 07/25) which has potential for interaction; increasing INR    Signs or symptoms of bleeding or clotting: No    Previous INR: Therapeutic last visit; previously outside of goal range    Additional findings: informed to stop lovenox       PLAN     Recommended plan for temporary change(s) affecting INR     Dosing Instructions: hold dose then decrease your warfarin dose (18.2% change) with next INR in 2 days       Summary  As of 7/18/2022    Full warfarin instructions:  7/19: Hold; Otherwise 5 mg every Mon, Thu; 2.5 mg all other days   Next INR check:  7/20/2022             Telephone call with Jaun who verbalizes understanding and agrees to plan, [facility] home care nurse was on the line also.    Orders given to  Homecare nurse/facility to recheck    Education provided: Monitoring for bleeding signs and symptoms and When to seek medical attention/emergency care    Plan made with [facility] Pharmacist Sherin Godinez, RN  Anticoagulation Clinic  7/18/2022    _______________________________________________________________________     Anticoagulation Episode Summary     Current INR goal:  2.0-3.0   TTR:  65.2 % (1 y)   Target end date:  Indefinite   Send INR reminders to:  SERENITY PINEDA    Indications    PE (pulmonary thromboembolism) (H) [I26.99]  Paroxysmal A-fib (H) [I48.0]  Deep vein thrombosis  (DVT) of distal vein of lower extremity  unspecified chronicity  unspecified laterality (H) [I82.4Z9]  Long term current use of anticoagulants with INR goal of 2.0-3.0 [Z79.01]           Comments:           Anticoagulation Care Providers     Provider Role Specialty Phone number    Dontrell Gómez MD Referring Internal Medicine [phone]    Anne Heath MD Referring Internal Medicine [phone]

## 2022-07-18 NOTE — TELEPHONE ENCOUNTER
Patient Request (Treatment for Stomach Pain)    Edith Hawthorne MD Katryan, Anita N; Glory Triage Im 3 days ago     XH    Please help pt schedule appointment with PCP or team for further evaluation soon.    Message text

## 2022-07-20 ENCOUNTER — ANTICOAGULATION THERAPY VISIT (OUTPATIENT)
Dept: ANTICOAGULATION | Facility: CLINIC | Age: 82
End: 2022-07-20

## 2022-07-20 DIAGNOSIS — Z79.01 LONG TERM CURRENT USE OF ANTICOAGULANTS WITH INR GOAL OF 2.0-3.0: ICD-10-CM

## 2022-07-20 DIAGNOSIS — I82.4Z9 DEEP VEIN THROMBOSIS (DVT) OF DISTAL VEIN OF LOWER EXTREMITY, UNSPECIFIED CHRONICITY, UNSPECIFIED LATERALITY (H): ICD-10-CM

## 2022-07-20 DIAGNOSIS — I48.0 PAROXYSMAL A-FIB (H): ICD-10-CM

## 2022-07-20 DIAGNOSIS — I26.99 PE (PULMONARY THROMBOEMBOLISM) (H): Primary | ICD-10-CM

## 2022-07-20 LAB — INR (EXTERNAL): 7.3 (ref 0.9–1.1)

## 2022-07-20 NOTE — PROGRESS NOTES
ANTICOAGULATION MANAGEMENT     Erich Craven 82 year old male is on warfarin with supratherapeutic INR result. (Goal INR 2.0-3.0)    Recent labs: (last 7 days)     07/20/22  1109   INR 7.3*       ASSESSMENT       Source(s): Chart Review and Home Care/Facility Nurse       Warfarin doses taken: Warfarin taken as instructed    Diet: Started drinking Whey protein. No Vit K.     New illness, injury, or hospitalization: Yes: Recently in the ED with Diverticulitis. Stools are loose still but not diarrhea.    Medication/supplement changes: Cipro and Flagyl through 07/25/2022. These medications can increase INR/risk of bleeding.    Signs or symptoms of bleeding or clotting: No    Previous INR: Supratherapeutic    Additional findings: None       PLAN     Recommended plan for temporary change(s) affecting INR     Dosing Instructions: hold 2 doses then continue your current warfarin dose with next INR in 2 days       Summary  As of 7/20/2022    Full warfarin instructions:  7/20: Hold; 7/21: Hold; Otherwise 5 mg every Mon, Thu; 2.5 mg all other days   Next INR check:  7/22/2022             Telephone call with [facility] home care/facility nurse who verbalizes understanding and agrees to plan    Orders given to  Homecare nurse/facility to recheck    Education provided: Goal range and significance of current result    Plan made per [facility] anticoagulation protocol    Raphael Wright RN  Anticoagulation Clinic  7/20/2022    _______________________________________________________________________     Anticoagulation Episode Summary     Current INR goal:  2.0-3.0   TTR:  64.6 % (1 y)   Target end date:  Indefinite   Send INR reminders to:  SERENITY PINEDA    Indications    PE (pulmonary thromboembolism) (H) [I26.99]  Paroxysmal A-fib (H) [I48.0]  Deep vein thrombosis (DVT) of distal vein of lower extremity  unspecified chronicity  unspecified laterality (H) [I82.4Z9]  Long term current use of anticoagulants with INR goal of 2.0-3.0  [Z79.01]           Comments:           Anticoagulation Care Providers     Provider Role Specialty Phone number    Dontrell Gómez MD Referring Internal Medicine [phone]    Anne Heath MD Referring Internal Medicine [phone]

## 2022-07-20 NOTE — TELEPHONE ENCOUNTER
Patient Contact    Attempt # 1    Was call answered?  No.  Left message on voicemail with information to call back.    Keisha MARTIN, Triage RN  [facility]

## 2022-07-20 NOTE — TELEPHONE ENCOUNTER
"Hospital/TCU/ED for chronic condition Discharge Protocol    \"Hi, my name is Keith Maynard RN, a registered nurse, and I am calling from [facility].  I am calling to follow up and see how things are going for you after your recent emergency visit/hospital/TCU stay.\"    Tell me how you are doing now that you are home?\" better after starting abx for diverticulitis\".      Discharge Instructions    \"Let's review your discharge instructions.  What is/are the follow-up recommendations?  Pt. Response: follow up with PCP in two weeks.     \"Has an appointment with your primary care provider been scheduled?\"   Yes. (confirm)    \"When you see the provider, I would recommend that you bring your medications with you.\"    Medications    \"Tell me what changed about your medicines when you discharged?\"    Changes to chronic meds?    2 or more - Epic MTM referral needed    \"What questions do you have about your medications?\"    None     New diagnoses of heart failure, COPD, diabetes, or MI?    No          On warfarin: \"Were you given any recommendations for follow-up with the anticoagulation clinic?\" Yes - Anticoagulation clinic appointment is already scheduled at appropriate interval    Post Discharge Medication Reconciliation Status: discharge medications reconciled, continue medications without change.    Was MTM referral placed (*Make sure to put transitions as reason for referral)?   No    Call Summary    \"What questions or concerns do you have about your recent visit and your follow-up care?\"     none    \"If you have questions or things don't continue to improve, we encourage you contact us through the main clinic number (give number).  Even if the clinic is not open, triage nurses are available 24/7 to help you.     We would like you to know that our clinic has extended hours (provide information).  We also have urgent care (provide details on closest location and hours/contact info)\"      \"Thank you for your " "time and take care!\"             "

## 2022-07-20 NOTE — TELEPHONE ENCOUNTER
Pt has scheduled phone visit 07/26 for hospital follow up. If provider thinks phone visit is appropriate this encounter can be closed.     If appt  type needs to be changed, please send message back to triage with advice if provider would approve use of VV slot for in clinic appt.

## 2022-07-22 ENCOUNTER — ANTICOAGULATION THERAPY VISIT (OUTPATIENT)
Dept: ANTICOAGULATION | Facility: CLINIC | Age: 82
End: 2022-07-22

## 2022-07-22 DIAGNOSIS — I82.4Z9 DEEP VEIN THROMBOSIS (DVT) OF DISTAL VEIN OF LOWER EXTREMITY, UNSPECIFIED CHRONICITY, UNSPECIFIED LATERALITY (H): ICD-10-CM

## 2022-07-22 DIAGNOSIS — I48.0 PAROXYSMAL A-FIB (H): ICD-10-CM

## 2022-07-22 DIAGNOSIS — Z79.01 LONG TERM CURRENT USE OF ANTICOAGULANTS WITH INR GOAL OF 2.0-3.0: ICD-10-CM

## 2022-07-22 DIAGNOSIS — I26.99 PE (PULMONARY THROMBOEMBOLISM) (H): Primary | ICD-10-CM

## 2022-07-22 LAB — INR (EXTERNAL): 3.7 (ref 0.9–1.1)

## 2022-07-22 NOTE — PROGRESS NOTES
ANTICOAGULATION MANAGEMENT     Erich Craven 82 year old male is on warfarin with supratherapeutic INR result. (Goal INR 2.0-3.0)    Recent labs: (last 7 days)     07/22/22  1055   INR 3.7*       ASSESSMENT       Source(s): Chart Review and Home Care/Facility Nurse       Warfarin doses taken: Warfarin taken as instructed    Diet: Diverticulitis flare    New illness, injury, or hospitalization: Yes: Diverticulitis    Medication/supplement changes: Cipro/flagyl/through 7/25    Signs or symptoms of bleeding or clotting: No    Previous INR: Supratherapeutic    Additional findings: None       PLAN     Recommended plan for temporary change(s) affecting INR     Dosing Instructions: hold dose then continue your current warfarin dose with next INR in 3 days       Summary  As of 7/22/2022    Full warfarin instructions:  7/22: Hold; Otherwise 5 mg every Mon, Thu; 2.5 mg all other days   Next INR check:  7/25/2022             Telephone call with [facility] home care/facility nurse who agrees to plan and repeated back plan correctly    Orders given to  Homecare nurse/facility to recheck    Education provided: Please call back if any changes to your diet, medications or how you've been taking warfarin    Plan made per [facility] anticoagulation protocol    Roxanna Garcia, RN  Anticoagulation Clinic  7/22/2022    _______________________________________________________________________     Anticoagulation Episode Summary     Current INR goal:  2.0-3.0   TTR:  64.1 % (1 y)   Target end date:  Indefinite   Send INR reminders to:  [facility]    Indications    PE (pulmonary thromboembolism) (H) [I26.99]  Paroxysmal A-fib (H) [I48.0]  Deep vein thrombosis (DVT) of distal vein of lower extremity  unspecified chronicity  unspecified laterality (H) [I82.4Z9]  Long term current use of anticoagulants with INR goal of 2.0-3.0 [Z79.01]           Comments:           Anticoagulation Care Providers     Provider Role Specialty Phone number    [place]  Dontrell SOTO MD Referring Internal Medicine [phone]    Anne Heath MD Referring Internal Medicine [phone]

## 2022-07-23 ENCOUNTER — ANCILLARY PROCEDURE (OUTPATIENT)
Dept: CARDIOLOGY | Facility: CLINIC | Age: 82
End: 2022-07-23
Attending: INTERNAL MEDICINE
Payer: MEDICARE

## 2022-07-23 DIAGNOSIS — Z95.0 CARDIAC PACEMAKER IN SITU: ICD-10-CM

## 2022-07-23 DIAGNOSIS — I49.5 SICK SINUS SYNDROME (H): ICD-10-CM

## 2022-07-23 PROCEDURE — 93294 REM INTERROG EVL PM/LDLS PM: CPT | Performed by: INTERNAL MEDICINE

## 2022-07-23 PROCEDURE — 93296 REM INTERROG EVL PM/IDS: CPT | Performed by: INTERNAL MEDICINE

## 2022-07-25 ENCOUNTER — ANTICOAGULATION THERAPY VISIT (OUTPATIENT)
Dept: ANTICOAGULATION | Facility: CLINIC | Age: 82
End: 2022-07-25

## 2022-07-25 ENCOUNTER — MYC MEDICAL ADVICE (OUTPATIENT)
Dept: FAMILY MEDICINE | Facility: CLINIC | Age: 82
End: 2022-07-25

## 2022-07-25 DIAGNOSIS — Z79.01 LONG TERM CURRENT USE OF ANTICOAGULANTS WITH INR GOAL OF 2.0-3.0: ICD-10-CM

## 2022-07-25 DIAGNOSIS — I26.99 PE (PULMONARY THROMBOEMBOLISM) (H): Primary | ICD-10-CM

## 2022-07-25 DIAGNOSIS — I82.4Z9 DEEP VEIN THROMBOSIS (DVT) OF DISTAL VEIN OF LOWER EXTREMITY, UNSPECIFIED CHRONICITY, UNSPECIFIED LATERALITY (H): ICD-10-CM

## 2022-07-25 DIAGNOSIS — I48.0 PAROXYSMAL A-FIB (H): ICD-10-CM

## 2022-07-25 LAB — INR (EXTERNAL): 1.9 (ref 0.9–1.1)

## 2022-07-25 NOTE — PROGRESS NOTES
ANTICOAGULATION MANAGEMENT     Erich Craven 82 year old male is on warfarin with subtherapeutic INR result. (Goal INR 2.0-3.0)    Recent labs: (last 7 days)     07/25/22  1516   INR 1.9*       ASSESSMENT       Source(s): Chart Review, Patient/Caregiver Call and Home Care/Facility Nurse       Warfarin doses taken: Warfarin taken as instructed    Diet: No new diet changes identified    New illness, injury, or hospitalization: No    Medication/supplement changes: None noted-completed Flagyl and cipro.    Signs or symptoms of bleeding or clotting: No    Previous INR: Supratherapeutic    Additional findings: None            PLAN     Recommended plan for no diet, medication or health factor changes affecting INR     Dosing Instructions: Increase your warfarin dose (11.1% change) with next INR in 1 week.  Per previous notes, maintenance dose was decreased empirically due to drug interaction.  This dose increase is above protocol but it is the smallest dose adjustment that can be made with this size tablet    Summary  As of 7/25/2022    Full warfarin instructions:  5 mg every Mon, Wed, Fri; 2.5 mg all other days   Next INR check:  8/1/2022             Telephone call with [facility] home care/facility nurse who agrees to plan and repeated back plan correctly    Orders given to  Homecare nurse/facility to recheck    Education provided: Goal range and significance of current result, Importance of therapeutic range, Importance of following up at instructed interval and Importance of taking warfarin as instructed    Plan made per [facility] anticoagulation protocol    Shani Alva, RN  Anticoagulation Clinic  7/25/2022    _______________________________________________________________________     Anticoagulation Episode Summary     Current INR goal:  2.0-3.0   TTR:  63.7 % (1 y)   Target end date:  Indefinite   Send INR reminders to:  SERENITY PINEDA    Indications    PE (pulmonary thromboembolism) (H) [I26.99]  Paroxysmal  A-fib (H) [I48.0]  Deep vein thrombosis (DVT) of distal vein of lower extremity  unspecified chronicity  unspecified laterality (H) [I82.4Z9]  Long term current use of anticoagulants with INR goal of 2.0-3.0 [Z79.01]           Comments:           Anticoagulation Care Providers     Provider Role Specialty Phone number    Dontrell Gómez MD Referring Internal Medicine [phone]    Anne Heath MD Referring Internal Medicine [phone]

## 2022-07-26 ENCOUNTER — MEDICAL CORRESPONDENCE (OUTPATIENT)
Dept: HEALTH INFORMATION MANAGEMENT | Facility: CLINIC | Age: 82
End: 2022-07-26

## 2022-07-26 DIAGNOSIS — Z95.0 CARDIAC PACEMAKER IN SITU: ICD-10-CM

## 2022-07-26 DIAGNOSIS — I49.5 SICK SINUS SYNDROME (H): ICD-10-CM

## 2022-07-26 DIAGNOSIS — R94.31 ABNORMAL ELECTROCARDIOGRAM: Primary | ICD-10-CM

## 2022-07-31 LAB
MDC_IDC_EPISODE_DTM: NORMAL
MDC_IDC_EPISODE_DURATION: 10 S
MDC_IDC_EPISODE_DURATION: 16 S
MDC_IDC_EPISODE_DURATION: 17 S
MDC_IDC_EPISODE_DURATION: 22 S
MDC_IDC_EPISODE_DURATION: 5 S
MDC_IDC_EPISODE_DURATION: 6 S
MDC_IDC_EPISODE_DURATION: 7 S
MDC_IDC_EPISODE_DURATION: 8 S
MDC_IDC_EPISODE_ID: NORMAL
MDC_IDC_EPISODE_TYPE: NORMAL
MDC_IDC_LEAD_IMPLANT_DT: NORMAL
MDC_IDC_LEAD_IMPLANT_DT: NORMAL
MDC_IDC_LEAD_LOCATION: NORMAL
MDC_IDC_LEAD_LOCATION: NORMAL
MDC_IDC_LEAD_LOCATION_DETAIL_1: NORMAL
MDC_IDC_LEAD_LOCATION_DETAIL_1: NORMAL
MDC_IDC_LEAD_MFG: NORMAL
MDC_IDC_LEAD_MFG: NORMAL
MDC_IDC_LEAD_MODEL: NORMAL
MDC_IDC_LEAD_MODEL: NORMAL
MDC_IDC_LEAD_POLARITY_TYPE: NORMAL
MDC_IDC_LEAD_POLARITY_TYPE: NORMAL
MDC_IDC_LEAD_SERIAL: NORMAL
MDC_IDC_LEAD_SERIAL: NORMAL
MDC_IDC_MSMT_BATTERY_DTM: NORMAL
MDC_IDC_MSMT_BATTERY_REMAINING_LONGEVITY: 30 MO
MDC_IDC_MSMT_BATTERY_REMAINING_PERCENTAGE: 50 %
MDC_IDC_MSMT_BATTERY_STATUS: NORMAL
MDC_IDC_MSMT_LEADCHNL_RA_IMPEDANCE_VALUE: 439 OHM
MDC_IDC_MSMT_LEADCHNL_RA_PACING_THRESHOLD_AMPLITUDE: 0.6 V
MDC_IDC_MSMT_LEADCHNL_RA_PACING_THRESHOLD_PULSEWIDTH: 0.4 MS
MDC_IDC_MSMT_LEADCHNL_RV_IMPEDANCE_VALUE: 384 OHM
MDC_IDC_MSMT_LEADCHNL_RV_PACING_THRESHOLD_AMPLITUDE: 2 V
MDC_IDC_MSMT_LEADCHNL_RV_PACING_THRESHOLD_PULSEWIDTH: 0.4 MS
MDC_IDC_PG_IMPLANT_DTM: NORMAL
MDC_IDC_PG_MFG: NORMAL
MDC_IDC_PG_MODEL: NORMAL
MDC_IDC_PG_SERIAL: NORMAL
MDC_IDC_PG_TYPE: NORMAL
MDC_IDC_SESS_CLINIC_NAME: NORMAL
MDC_IDC_SESS_DTM: NORMAL
MDC_IDC_SESS_TYPE: NORMAL
MDC_IDC_SET_BRADY_AT_MODE_SWITCH_MODE: NORMAL
MDC_IDC_SET_BRADY_AT_MODE_SWITCH_RATE: 150 {BEATS}/MIN
MDC_IDC_SET_BRADY_LOWRATE: 60 {BEATS}/MIN
MDC_IDC_SET_BRADY_MAX_SENSOR_RATE: 130 {BEATS}/MIN
MDC_IDC_SET_BRADY_MAX_TRACKING_RATE: 130 {BEATS}/MIN
MDC_IDC_SET_BRADY_MODE: NORMAL
MDC_IDC_SET_BRADY_PAV_DELAY_HIGH: 220 MS
MDC_IDC_SET_BRADY_PAV_DELAY_LOW: 250 MS
MDC_IDC_SET_BRADY_SAV_DELAY_HIGH: 220 MS
MDC_IDC_SET_BRADY_SAV_DELAY_LOW: 250 MS
MDC_IDC_SET_LEADCHNL_RA_PACING_AMPLITUDE: 2 V
MDC_IDC_SET_LEADCHNL_RA_PACING_POLARITY: NORMAL
MDC_IDC_SET_LEADCHNL_RA_PACING_PULSEWIDTH: 0.4 MS
MDC_IDC_SET_LEADCHNL_RA_SENSING_ADAPTATION_MODE: NORMAL
MDC_IDC_SET_LEADCHNL_RA_SENSING_POLARITY: NORMAL
MDC_IDC_SET_LEADCHNL_RA_SENSING_SENSITIVITY: 0.5 MV
MDC_IDC_SET_LEADCHNL_RV_PACING_AMPLITUDE: 2.7 V
MDC_IDC_SET_LEADCHNL_RV_PACING_CAPTURE_MODE: NORMAL
MDC_IDC_SET_LEADCHNL_RV_PACING_POLARITY: NORMAL
MDC_IDC_SET_LEADCHNL_RV_PACING_PULSEWIDTH: 0.4 MS
MDC_IDC_SET_LEADCHNL_RV_SENSING_ADAPTATION_MODE: NORMAL
MDC_IDC_SET_LEADCHNL_RV_SENSING_POLARITY: NORMAL
MDC_IDC_SET_LEADCHNL_RV_SENSING_SENSITIVITY: 1 MV
MDC_IDC_SET_ZONE_DETECTION_INTERVAL: 375 MS
MDC_IDC_SET_ZONE_TYPE: NORMAL
MDC_IDC_SET_ZONE_VENDOR_TYPE: NORMAL
MDC_IDC_STAT_AT_BURDEN_PERCENT: 1 %
MDC_IDC_STAT_AT_DTM_END: NORMAL
MDC_IDC_STAT_AT_DTM_START: NORMAL
MDC_IDC_STAT_BRADY_DTM_END: NORMAL
MDC_IDC_STAT_BRADY_DTM_START: NORMAL
MDC_IDC_STAT_BRADY_RA_PERCENT_PACED: 11 %
MDC_IDC_STAT_BRADY_RV_PERCENT_PACED: 0 %
MDC_IDC_STAT_EPISODE_RECENT_COUNT: 0
MDC_IDC_STAT_EPISODE_RECENT_COUNT: 0
MDC_IDC_STAT_EPISODE_RECENT_COUNT: 28
MDC_IDC_STAT_EPISODE_RECENT_COUNT: 9
MDC_IDC_STAT_EPISODE_RECENT_COUNT_DTM_END: NORMAL
MDC_IDC_STAT_EPISODE_RECENT_COUNT_DTM_START: NORMAL
MDC_IDC_STAT_EPISODE_TYPE: NORMAL
MDC_IDC_STAT_EPISODE_VENDOR_TYPE: NORMAL

## 2022-08-01 ENCOUNTER — VIRTUAL VISIT (OUTPATIENT)
Dept: PHARMACY | Facility: CLINIC | Age: 82
End: 2022-08-01
Payer: COMMERCIAL

## 2022-08-01 ENCOUNTER — ANTICOAGULATION THERAPY VISIT (OUTPATIENT)
Dept: ANTICOAGULATION | Facility: CLINIC | Age: 82
End: 2022-08-01

## 2022-08-01 ENCOUNTER — VIRTUAL VISIT (OUTPATIENT)
Dept: FAMILY MEDICINE | Facility: CLINIC | Age: 82
End: 2022-08-01
Payer: MEDICARE

## 2022-08-01 DIAGNOSIS — Z78.9 TAKES DIETARY SUPPLEMENTS: ICD-10-CM

## 2022-08-01 DIAGNOSIS — I10 PRIMARY HYPERTENSION: ICD-10-CM

## 2022-08-01 DIAGNOSIS — N40.0 BENIGN PROSTATIC HYPERPLASIA WITHOUT LOWER URINARY TRACT SYMPTOMS: ICD-10-CM

## 2022-08-01 DIAGNOSIS — G89.29 CHRONIC PAIN OF BOTH SHOULDERS: Primary | ICD-10-CM

## 2022-08-01 DIAGNOSIS — I10 ESSENTIAL HYPERTENSION: ICD-10-CM

## 2022-08-01 DIAGNOSIS — J44.9 CHRONIC OBSTRUCTIVE PULMONARY DISEASE, UNSPECIFIED COPD TYPE (H): ICD-10-CM

## 2022-08-01 DIAGNOSIS — Z09 HOSPITAL DISCHARGE FOLLOW-UP: ICD-10-CM

## 2022-08-01 DIAGNOSIS — M25.512 CHRONIC PAIN OF BOTH SHOULDERS: Primary | ICD-10-CM

## 2022-08-01 DIAGNOSIS — I48.0 PAROXYSMAL A-FIB (H): ICD-10-CM

## 2022-08-01 DIAGNOSIS — I82.4Z9 DEEP VEIN THROMBOSIS (DVT) OF DISTAL VEIN OF LOWER EXTREMITY, UNSPECIFIED CHRONICITY, UNSPECIFIED LATERALITY (H): ICD-10-CM

## 2022-08-01 DIAGNOSIS — E03.9 HYPOTHYROIDISM, UNSPECIFIED TYPE: ICD-10-CM

## 2022-08-01 DIAGNOSIS — E78.49 OTHER HYPERLIPIDEMIA: ICD-10-CM

## 2022-08-01 DIAGNOSIS — D72.829 LEUKOCYTOSIS, UNSPECIFIED TYPE: Primary | ICD-10-CM

## 2022-08-01 DIAGNOSIS — M25.511 CHRONIC PAIN OF BOTH SHOULDERS: Primary | ICD-10-CM

## 2022-08-01 DIAGNOSIS — I25.10 CORONARY ARTERY DISEASE INVOLVING NATIVE HEART WITHOUT ANGINA PECTORIS, UNSPECIFIED VESSEL OR LESION TYPE: ICD-10-CM

## 2022-08-01 DIAGNOSIS — I50.33 ACUTE ON CHRONIC HEART FAILURE WITH PRESERVED EJECTION FRACTION (H): ICD-10-CM

## 2022-08-01 DIAGNOSIS — N18.32 STAGE 3B CHRONIC KIDNEY DISEASE (H): ICD-10-CM

## 2022-08-01 DIAGNOSIS — Z79.01 LONG TERM CURRENT USE OF ANTICOAGULANTS WITH INR GOAL OF 2.0-3.0: ICD-10-CM

## 2022-08-01 DIAGNOSIS — I26.99 PE (PULMONARY THROMBOEMBOLISM) (H): Primary | ICD-10-CM

## 2022-08-01 DIAGNOSIS — I26.99 PE (PULMONARY THROMBOEMBOLISM) (H): ICD-10-CM

## 2022-08-01 DIAGNOSIS — D64.9 ANEMIA, UNSPECIFIED TYPE: ICD-10-CM

## 2022-08-01 DIAGNOSIS — E11.9 TYPE 2 DIABETES MELLITUS WITHOUT COMPLICATION, WITHOUT LONG-TERM CURRENT USE OF INSULIN (H): ICD-10-CM

## 2022-08-01 DIAGNOSIS — F41.9 ANXIETY: ICD-10-CM

## 2022-08-01 LAB — INR (EXTERNAL): 1.2 (ref 0.9–1.1)

## 2022-08-01 PROCEDURE — 99605 MTMS BY PHARM NP 15 MIN: CPT | Performed by: PHARMACIST

## 2022-08-01 PROCEDURE — 99215 OFFICE O/P EST HI 40 MIN: CPT | Mod: 95 | Performed by: INTERNAL MEDICINE

## 2022-08-01 RX ORDER — ALBUTEROL SULFATE 90 UG/1
2 AEROSOL, METERED RESPIRATORY (INHALATION) 4 TIMES DAILY PRN
COMMUNITY
End: 2023-06-08

## 2022-08-01 RX ORDER — ISOSORBIDE MONONITRATE 30 MG/1
30 TABLET, EXTENDED RELEASE ORAL DAILY
COMMUNITY
Start: 2022-04-01 | End: 2022-08-05

## 2022-08-01 NOTE — PROGRESS NOTES
Jaun is a 82 year old who is being evaluated via a billable video visit.      How would you like to obtain your AVS? Wiketshart  If the video visit is dropped, the invitation should be resent by: Text to cell phone: JOHN  Will anyone else be joining your video visit? No          Assessment & Plan   Problem List Items Addressed This Visit        Endocrine    Hypothyroidism    Relevant Orders    TSH with free T4 reflex    HLD (hyperlipidemia)    Relevant Orders    Lipid panel reflex to direct LDL Fasting    Diabetes mellitus, type 2 (H)    Relevant Orders    Comprehensive metabolic panel (BMP + Alb, Alk Phos, ALT, AST, Total. Bili, TP)    Hemoglobin A1c       Circulatory    HTN (hypertension)    Relevant Orders    Comprehensive metabolic panel (BMP + Alb, Alk Phos, ALT, AST, Total. Bili, TP)       Urinary    Chronic kidney disease, stage 3 (H)    Relevant Orders    Comprehensive metabolic panel (BMP + Alb, Alk Phos, ALT, AST, Total. Bili, TP)      Other Visit Diagnoses     Leukocytosis, unspecified type    -  Primary    Relevant Orders    CBC with platelets    Anemia, unspecified type        Relevant Orders    CBC with platelets    Ferritin    Ferritin    Vitamin B12    Hospital discharge follow-up            FOLLOW WITH PULM AND CARDS  CHECK LABS- CBC,CMP, HBA1C, FERRITIN  KEEP WELL HYDRATED  VITALS ARE STABLE, O2 LOW 90'S NOT USING BIPAP,   HAS HOME HEALTH  NO ABDOMINAL PIAN, LEUKOCYTOSIS, HAS SOME SWEATING CHECK TSH, WBC, DENIES FEVER OR CHILLS , HAS NOCTURIA, NO DYSURIA., BREATHING IS OK, USING INHALERS FOLLOW WITH PULM FOR BIPAP.            See Patient Instructions    No follow-ups on file.    Anne Heath MD  [facility]    Subjective   Jaun is a 82 year old, presenting for the following health issues:  ER F/U      History of Present Illness       Reason for visit:  Follow up  Symptom onset:  3-7 days ago  Symptoms include:  Diviticulitid  Symptom intensity:  Severe  Symptom progression:   Improving  Had these symptoms before:  Yes  Has tried/received treatment for these symptoms:  Yes  Previous treatment was successful:  Yes  Prior treatment description:  Hospital & medicine    He eats 0-1 servings of fruits and vegetables daily.He consumes 2 sweetened beverage(s) daily. He exercises with enough effort to increase his heart rate 3 or less days per week. He is missing 1 dose(s) of medications per week.       ED Followup:    Facility:  [facility]  Date of visit: 7/14-7/15 (2 hrs)  Reason for visit: Diverticulitis of colon   Current Status: better    Doing much better    Went to [facility] ER and developed diverticulitis  On tylenol for pain, no recurrence of pain, finished antibiotics,     Warfarin with history of COPD, PE, DVT, hypertension, CHF RIN, hypothyroidism, paroxysmal a-fib, hyperlipidemia, HFpEF, CAD, type 2 diabetes mellitus, and CKD    Using BIPAP, can not use it  Hard to use with arm      Pox is 93% P 87-92  Has Home health /75    NOT DIZZY     Has night sweats,   Urine output is well.    Wt down to 253 lbs    Calling him from coumadin cliuniuc    Chemistry showed increased kidney test and wbc elevated,.    On spironolactone, lasix and zaroxylyn, no orthostatic dizziness, more mobile      No wheezing or chest tightness using Trelegy. Seeing cards in fall    pulm in Dec q 6 months    Review of Systems   Constitutional, HEENT, cardiovascular, pulmonary, gi and gu systems are negative, except as otherwise noted.      Objective           Vitals:  No vitals were obtained today due to virtual visit.    Physical Exam   GENERAL: Healthy, alert and no distress  EYES: Eyes grossly normal to inspection.  No discharge or erythema, or obvious scleral/conjunctival abnormalities.  RESP: No audible wheeze, cough, or visible cyanosis.  No visible retractions or increased work of breathing.    SKIN: Visible skin clear. No significant rash, abnormal pigmentation or  lesions.  NEURO: Cranial nerves grossly intact.  Mentation and speech appropriate for age.  PSYCH: Mentation appears normal, affect normal/bright, judgement and insight intact, normal speech and appearance well-groomed.    Anticoagulation Therapy Visit on 07/25/2022   Component Date Value Ref Range Status     INR (External) 07/25/2022 1.9 (A) 0.9 - 1.1 Final               Video-Visit Details    Video Start Time: 10:01 AM    Type of service:  Video Visit    Video End Time:10:19 AM  Total time was 41 minutes, review of records and visit and recommendations  Originating Location (pt. Location): Home    Distant Location (provider location):  [facility]     Platform used for Video Visit: [employer]    .  ..

## 2022-08-01 NOTE — PROGRESS NOTES
Medication Therapy Management (MTM) Encounter    ASSESSMENT:                            Medication Adherence/Access: No issues identified    Shoulder Pain: Improved.    Anxiety/Depression: Stable.    Hypertension/A. Fib/HFpEF/H/o PE/DVT/CAD: Plan in place.    Hyperlipidemia: Stable.    COPD: Stable.    Hypothyroidism: Stable.  Last TSH was within normal limits.    BPH: Stable.    Supplements: No medical indication for acetyl-L-carnitine, taurine or CoQ10 - he could stop taking these.  I agree with continuing daily multivitamin.    PLAN:                            1.  Advised he could stop acetyl-l-carnitine, CoQ10 and taurine.    Follow-up: Return in about 6 months (around 2/1/2023) for Follow-up Medication Review.    SUBJECTIVE/OBJECTIVE:                          Erich Craven is a 82 year old male contacted via secure video for a follow-up visit.  Today's visit is a follow-up MTM visit from 12/23/2021.     Reason for visit: Routine follow-up.    Tobacco: He reports that he has quit smoking. He has a 12.50 pack-year smoking history. He has never used smokeless tobacco.  Alcohol: history of alcohol dependence - sober since 1989    Medication Adherence/Access: no issues reported    Shoulder Pain:  Jaun did undergo shoulder replacement surgery in early July and he's happy to be off opioids.  He is managing pain with acetaminophen - 1000mg three times daily or four times daily.  He reports pain is currently well controlled, no side effects reported.      Anxiety/Depression: Jaun continues taking venlafaxine 37.5 mg twice daily.  He reports his mood has been stable.  He denies suicidal ideation.  He feels he's managing well at this time.    Hypertension/A. Fib/HFpEF/H/o PE/DVT/CAD:  Patient has history of coronary artery disease with stenting of the proximal left anterior descending artery and also stenting of the proximal to mid right coronary artery in 2014.  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     Current  medications include aspirin 81mg daily, warfarin as directed, carvedilol 12.5mg twice daily, diltiazem ER 300mg daily, furosemide 40 mg every morning and 20 mg every afternoon, Imdur 30mg daily, lisinopril 20mg daily, metolazone 2.5mg twice daily and spironolactone 25mg every other day.  Patient does not self-monitor blood pressure.  Patient reports no current medication side effects including signs and symptoms bruising/bleeding.  Patient denies any HF symptoms.    Last EF 2/27/19 - 66%  He reports he's been losing weight - current weight is about 253#  INR   Date Value Ref Range Status   06/14/2021 2.80 (H) 0.86 - 1.14 Final     Comment:     This test is intended for monitoring Coumadin therapy.  Results are not   accurate in patients with prolonged INR due to factor deficiency.       INR (External)   Date Value Ref Range Status   08/01/2022 1.2 (A) 0.9 - 1.1 Final       BP Readings from Last 3 Encounters:   07/15/22 (!) 129/98   05/03/22 105/69   04/21/22 121/75      Potassium   Date Value Ref Range Status   07/14/2022 3.7 3.4 - 5.3 mmol/L Final   06/21/2021 4.1 3.4 - 5.3 mmol/L Final       Hyperlipidemia: Current therapy includes atorvastatin 40mg daily.  Patient reports no significant myalgias or other side effects.  Recent Labs   Lab Test 05/03/22  1656 10/20/21  1022   CHOL 141 126   HDL 48 48   LDL 57 64   TRIG 180* 70     COPD: Current medications: albuterol MDI as needed (no recent use) and Trelegy Ellipta 200mcg daily.    Patient is not experiencing side effects.   Patient reports the following symptoms: none.  Patient does have an COPD Action Plan on file.   Has spirometry been completed: Yes     Hypothyroidism: Patient is taking levothyroxine 175 mcg daily except takes 1 & 1/2 tablets on Fridays. Patient is having the following symptoms: none.   TSH   Date Value Ref Range Status   05/23/2022 1.98 0.40 - 4.00 mU/L Final   06/14/2021 0.71 0.40 - 4.00 mU/L Final       BPH: Patient is taking tamsulosin 0.4  mg daily.  He reports urinary symptoms are stable.  No side effects reported.    Supplements: Jaun has resumed all supplements - acetyl l-carnitine, taurine, multivitamin and CoQ10.  At our last visit, he had stopped acetyl l-carnitine and taurine and had noticed no changes off, but ended up resuming and still has noticed no differences taking.  He denies side effects of therapy.    Today's Vitals: There were no vitals taken for this visit.  ----------------  Post Discharge Medication Reconciliation Status: discharge medications reconciled and changed, per note/orders.    I spent 9 minutes with this patient today. A copy of the visit note was provided to the patient's provider(s).    The patient was sent via [employer] a summary of these recommendations.     Kassi Glez, PharmD, [facility]  Medication Therapy Management Provider  Pager: [phone]     Telemedicine Visit Details  Type of service:  Video Conference via [employer]  Start Time: 1:40 PM  End Time: 1:49 PM  Originating Location (patient location): [place]  Distant Location (provider location):  [facility]     Medication Therapy Recommendations  Takes dietary supplements    Current Medication: Acetylcarnitine HCl (ACETYL L-CARNITINE PO)   Rationale: No medical indication at this time - Unnecessary medication therapy - Indication   Recommendation: Discontinue Medication - also CoQ10 and taurine   Status: Accepted - no CPA Needed

## 2022-08-01 NOTE — PROGRESS NOTES
ANTICOAGULATION MANAGEMENT     Erich Craven 82 year old male is on warfarin with subtherapeutic INR result. (Goal INR 2.0-3.0)    Recent labs: (last 7 days)     08/01/22  1250   INR 1.2*       ASSESSMENT       Source(s): Chart Review and Home Care/Facility Nurse       Warfarin doses taken: Warfarin taken as instructed    Diet: No new diet changes identified    New illness, injury, or hospitalization: Yes: Recently treated for diverticulitis per below. Pt is no longer having diarrhea, in fact he is a little constipated.    Medication/supplement changes:     Cipro and Flagyl 07/15-07/25. Warfarin dose was reduced at the beginning of abx therapy as these medications can significantly increase INR.    Signs or symptoms of bleeding or clotting: No    Previous INR: Subtherapeutic    Additional findings: None       PLAN     Recommended plan for ongoing change(s) affecting INR     Dosing Instructions: booster dose then Increase your warfarin dose (40% change) with next INR in 1 week       Summary  As of 8/1/2022    Full warfarin instructions:  8/1: 7.5 mg; Otherwise 5 mg every day   Next INR check:  8/8/2022             Telephone call with [facility] home care/facility nurse who verbalizes understanding and agrees to plan    Orders given to  Homecare nurse/facility to recheck    Education provided: Goal range and significance of current result    Plan made per [facility] anticoagulation protocol    Raphael Wright RN  Anticoagulation Clinic  8/1/2022    _______________________________________________________________________     Anticoagulation Episode Summary     Current INR goal:  2.0-3.0   TTR:  61.8 % (1 y)   Target end date:  Indefinite   Send INR reminders to:  SERENITY PINEDA    Indications    PE (pulmonary thromboembolism) (H) [I26.99]  Paroxysmal A-fib (H) [I48.0]  Deep vein thrombosis (DVT) of distal vein of lower extremity  unspecified chronicity  unspecified laterality (H) [I82.4Z9]  Long term current use of anticoagulants  with INR goal of 2.0-3.0 [Z79.01]           Comments:           Anticoagulation Care Providers     Provider Role Specialty Phone number    Dontrell Gómez MD Referring Internal Medicine [phone]    Anne Heath MD Referring Internal Medicine [phone]

## 2022-08-02 ENCOUNTER — MEDICAL CORRESPONDENCE (OUTPATIENT)
Dept: HEALTH INFORMATION MANAGEMENT | Facility: CLINIC | Age: 82
End: 2022-08-02

## 2022-08-02 ENCOUNTER — LAB (OUTPATIENT)
Dept: LAB | Facility: CLINIC | Age: 82
End: 2022-08-02
Payer: MEDICARE

## 2022-08-02 DIAGNOSIS — D72.829 LEUKOCYTOSIS, UNSPECIFIED TYPE: ICD-10-CM

## 2022-08-02 DIAGNOSIS — N18.32 STAGE 3B CHRONIC KIDNEY DISEASE (H): ICD-10-CM

## 2022-08-02 DIAGNOSIS — I10 PRIMARY HYPERTENSION: ICD-10-CM

## 2022-08-02 DIAGNOSIS — E03.9 HYPOTHYROIDISM, UNSPECIFIED TYPE: ICD-10-CM

## 2022-08-02 DIAGNOSIS — D64.9 ANEMIA, UNSPECIFIED TYPE: ICD-10-CM

## 2022-08-02 DIAGNOSIS — E11.9 TYPE 2 DIABETES MELLITUS WITHOUT COMPLICATION, WITHOUT LONG-TERM CURRENT USE OF INSULIN (H): ICD-10-CM

## 2022-08-02 DIAGNOSIS — E78.49 OTHER HYPERLIPIDEMIA: ICD-10-CM

## 2022-08-02 LAB
ALBUMIN SERPL-MCNC: 3.2 G/DL (ref 3.4–5)
ALP SERPL-CCNC: 100 U/L (ref 40–150)
ALT SERPL W P-5'-P-CCNC: 17 U/L (ref 0–70)
ANION GAP SERPL CALCULATED.3IONS-SCNC: 8 MMOL/L (ref 3–14)
AST SERPL W P-5'-P-CCNC: 13 U/L (ref 0–45)
BILIRUB SERPL-MCNC: 0.4 MG/DL (ref 0.2–1.3)
BUN SERPL-MCNC: 32 MG/DL (ref 7–30)
CALCIUM SERPL-MCNC: 9.3 MG/DL (ref 8.5–10.1)
CHLORIDE BLD-SCNC: 97 MMOL/L (ref 94–109)
CHOLEST SERPL-MCNC: 129 MG/DL
CO2 SERPL-SCNC: 31 MMOL/L (ref 20–32)
CREAT SERPL-MCNC: 1.01 MG/DL (ref 0.66–1.25)
ERYTHROCYTE [DISTWIDTH] IN BLOOD BY AUTOMATED COUNT: 14.8 % (ref 10–15)
FASTING STATUS PATIENT QL REPORTED: YES
FERRITIN SERPL-MCNC: 58 NG/ML (ref 26–388)
GFR SERPL CREATININE-BSD FRML MDRD: 74 ML/MIN/1.73M2
GLUCOSE BLD-MCNC: 109 MG/DL (ref 70–99)
HBA1C MFR BLD: 5.9 % (ref 0–5.6)
HCT VFR BLD AUTO: 39.6 % (ref 40–53)
HDLC SERPL-MCNC: 48 MG/DL
HGB BLD-MCNC: 12.6 G/DL (ref 13.3–17.7)
LDLC SERPL CALC-MCNC: 63 MG/DL
MCH RBC QN AUTO: 30.4 PG (ref 26.5–33)
MCHC RBC AUTO-ENTMCNC: 31.8 G/DL (ref 31.5–36.5)
MCV RBC AUTO: 96 FL (ref 78–100)
NONHDLC SERPL-MCNC: 81 MG/DL
PLATELET # BLD AUTO: 283 10E3/UL (ref 150–450)
POTASSIUM BLD-SCNC: 3.6 MMOL/L (ref 3.4–5.3)
PROT SERPL-MCNC: 7.2 G/DL (ref 6.8–8.8)
RBC # BLD AUTO: 4.14 10E6/UL (ref 4.4–5.9)
SODIUM SERPL-SCNC: 136 MMOL/L (ref 133–144)
TRIGL SERPL-MCNC: 92 MG/DL
TSH SERPL DL<=0.005 MIU/L-ACNC: 1.01 MU/L (ref 0.4–4)
VIT B12 SERPL-MCNC: 2451 PG/ML (ref 232–1245)
WBC # BLD AUTO: 10.1 10E3/UL (ref 4–11)

## 2022-08-02 PROCEDURE — 82607 VITAMIN B-12: CPT

## 2022-08-02 PROCEDURE — 84443 ASSAY THYROID STIM HORMONE: CPT

## 2022-08-02 PROCEDURE — 80061 LIPID PANEL: CPT

## 2022-08-02 PROCEDURE — 82728 ASSAY OF FERRITIN: CPT

## 2022-08-02 PROCEDURE — 36415 COLL VENOUS BLD VENIPUNCTURE: CPT

## 2022-08-02 PROCEDURE — 80053 COMPREHEN METABOLIC PANEL: CPT

## 2022-08-02 PROCEDURE — 85027 COMPLETE CBC AUTOMATED: CPT

## 2022-08-02 PROCEDURE — 83036 HEMOGLOBIN GLYCOSYLATED A1C: CPT

## 2022-08-02 NOTE — PATIENT INSTRUCTIONS
"Recommendations from today's MTM visit:                                                    MTM (medication therapy management) is a service provided by a clinical pharmacist designed to help you get the most of out of your medicines.   Today we reviewed what your medicines are for, how to know if they are working, that your medicines are safe and how to make your medicine regimen as easy as possible.      You can stop acetyl l-carnitine, CoQ10 and taurine    Follow-up: Return in about 6 months (around 2/1/2023) for Follow-up Medication Review.    It was great speaking with you today.  I value your experience and would be very thankful for your time in providing feedback in our clinic survey. In the next few days, you may receive an email or text message from [employer] with a link to a survey related to your  clinical pharmacist.\"     To schedule another MTM appointment, please call the clinic directly or you may call the MTM scheduling line at [phone] or toll-free at [phone].     My Clinical Pharmacist's contact information:                                                      Please feel free to contact me with any questions or concerns you have.      Kassi Glez, Masood, [facility]  Medication Therapy Management Provider  Pager: [phone]    "

## 2022-08-08 ENCOUNTER — ANTICOAGULATION THERAPY VISIT (OUTPATIENT)
Dept: ANTICOAGULATION | Facility: CLINIC | Age: 82
End: 2022-08-08

## 2022-08-08 DIAGNOSIS — I48.0 PAROXYSMAL A-FIB (H): ICD-10-CM

## 2022-08-08 DIAGNOSIS — I82.4Z9 DEEP VEIN THROMBOSIS (DVT) OF DISTAL VEIN OF LOWER EXTREMITY, UNSPECIFIED CHRONICITY, UNSPECIFIED LATERALITY (H): ICD-10-CM

## 2022-08-08 DIAGNOSIS — Z79.01 LONG TERM CURRENT USE OF ANTICOAGULANTS WITH INR GOAL OF 2.0-3.0: ICD-10-CM

## 2022-08-08 DIAGNOSIS — I26.99 PE (PULMONARY THROMBOEMBOLISM) (H): Primary | ICD-10-CM

## 2022-08-08 LAB — INR (EXTERNAL): 1.4 (ref 0.9–1.1)

## 2022-08-08 NOTE — PROGRESS NOTES
ANTICOAGULATION MANAGEMENT     Erich Craven 82 year old male is on warfarin with subtherapeutic INR result. (Goal INR 2.0-3.0)    Recent labs: (last 7 days)     08/08/22  1042   INR 1.4*       ASSESSMENT       Source(s): Chart Review and Home Care/Facility Nurse       Warfarin doses taken: Warfarin taken as instructed    Diet: No new diet changes identified. Continues to drink whey protein (started in 07/2022). Higher albumin levels may cause need for higher warfarin dose.    New illness, injury, or hospitalization: No diarrhea.    Medication/supplement changes: None noted    Signs or symptoms of bleeding or clotting: No    Previous INR: Subtherapeutic    Additional findings: None       PLAN     Recommended plan for ongoing change(s) affecting INR     Dosing Instructions: booster dose then Increase your warfarin dose (14% change) with next INR in 1 week       Summary  As of 8/8/2022    Full warfarin instructions:  8/8: 10 mg; Otherwise 7.5 mg every Mon, Fri; 5 mg all other days   Next INR check:  8/15/2022             Telephone call with [facility] home care/facility nurse who verbalizes understanding and agrees to plan    Orders given to  Homecare nurse/facility to recheck    Education provided: Goal range and significance of current result    Plan made per [facility] anticoagulation protocol    Raphael Wright RN  Anticoagulation Clinic  8/8/2022    _______________________________________________________________________     Anticoagulation Episode Summary     Current INR goal:  2.0-3.0   TTR:  59.9 % (1 y)   Target end date:  Indefinite   Send INR reminders to:  [facility]    Indications    PE (pulmonary thromboembolism) (H) [I26.99]  Paroxysmal A-fib (H) [I48.0]  Deep vein thrombosis (DVT) of distal vein of lower extremity  unspecified chronicity  unspecified laterality (H) [I82.4Z9]  Long term current use of anticoagulants with INR goal of 2.0-3.0 [Z79.01]           Comments:           Anticoagulation Care Providers      Provider Role Specialty Phone number    Dontrell Gómez MD Referring Internal Medicine [phone]    Anne Heath MD Referring Internal Medicine [phone]

## 2022-08-09 DIAGNOSIS — I50.32 CHRONIC DIASTOLIC HEART FAILURE (H): ICD-10-CM

## 2022-08-10 DIAGNOSIS — E03.9 HYPOTHYROIDISM, UNSPECIFIED TYPE: ICD-10-CM

## 2022-08-11 RX ORDER — METOLAZONE 2.5 MG/1
2.5 TABLET ORAL 2 TIMES DAILY
Qty: 180 TABLET | Refills: 0 | Status: SHIPPED | OUTPATIENT
Start: 2022-08-11 | End: 2022-11-11

## 2022-08-11 RX ORDER — LEVOTHYROXINE SODIUM 175 UG/1
175 TABLET ORAL DAILY
Qty: 90 TABLET | Refills: 0 | Status: SHIPPED | OUTPATIENT
Start: 2022-08-11 | End: 2022-10-04

## 2022-08-11 NOTE — TELEPHONE ENCOUNTER
Pending Prescriptions:                       Disp   Refills    metolazone (ZAROXOLYN) 2.5 MG tablet      180 ta*0            Sig: Take 1 tablet (2.5 mg) by mouth 2 times daily      Last VV 8/1/2022 with Dr. Heath.    Routing refill request to provider for review/approval because:        Angelica Wen, RN BSN MSN  [facility]

## 2022-08-11 NOTE — TELEPHONE ENCOUNTER
Pending Prescriptions:                       Disp   Refills    levothyroxine (SYNTHROID/LEVOTHROID) 175 *90 tab*0            Sig: Take 1 tablet (175 mcg) by mouth daily      Last OV was 5/2/2022 with Dr. Heath.        Routing refill request to provider for review/approval because:  Patient report taking medication differently.     Angelica Wen, RN BSN MSN  [facility]

## 2022-08-15 ENCOUNTER — ANTICOAGULATION THERAPY VISIT (OUTPATIENT)
Dept: ANTICOAGULATION | Facility: CLINIC | Age: 82
End: 2022-08-15

## 2022-08-15 DIAGNOSIS — I82.4Z9 DEEP VEIN THROMBOSIS (DVT) OF DISTAL VEIN OF LOWER EXTREMITY, UNSPECIFIED CHRONICITY, UNSPECIFIED LATERALITY (H): ICD-10-CM

## 2022-08-15 DIAGNOSIS — I48.0 PAROXYSMAL A-FIB (H): ICD-10-CM

## 2022-08-15 DIAGNOSIS — I26.99 PE (PULMONARY THROMBOEMBOLISM) (H): Primary | ICD-10-CM

## 2022-08-15 DIAGNOSIS — Z79.01 LONG TERM CURRENT USE OF ANTICOAGULANTS WITH INR GOAL OF 2.0-3.0: ICD-10-CM

## 2022-08-15 LAB — INR (EXTERNAL): 1.8 (ref 0.9–1.1)

## 2022-08-15 NOTE — PROGRESS NOTES
ANTICOAGULATION MANAGEMENT     Erich Craven 82 year old male is on warfarin with subtherapeutic INR result. (Goal INR 2.0-3.0)    Recent labs: (last 7 days)     08/15/22  1102   INR 1.8*       ASSESSMENT       Source(s): Chart Review and Home Care/Facility Nurse       Warfarin doses taken: Warfarin taken as instructed    Diet: Patient continues to do whey protein shake once daily    New illness, injury, or hospitalization: No    Medication/supplement changes: None noted    Signs or symptoms of bleeding or clotting: No    Previous INR: Subtherapeutic    Additional findings: None       PLAN     Recommended plan for ongoing change(s) affecting INR     Dosing Instructions: Increase your warfarin dose (6.2% change) with next INR in 1 week       Summary  As of 8/15/2022    Full warfarin instructions:  7.5 mg every Mon, Wed, Fri; 5 mg all other days   Next INR check:  8/22/2022             Telephone call with [facility] home care/facility nurse who verbalizes understanding and agrees to plan    Orders given to  Homecare nurse/facility to recheck    Education provided: Please call back if any changes to your diet, medications or how you've been taking warfarin    Plan made per [facility] anticoagulation protocol    Katelyn Stoner RN  Anticoagulation Clinic  8/15/2022    _______________________________________________________________________     Anticoagulation Episode Summary     Current INR goal:  2.0-3.0   TTR:  58.0 % (1 y)   Target end date:  Indefinite   Send INR reminders to:  [facility]    Indications    PE (pulmonary thromboembolism) (H) [I26.99]  Paroxysmal A-fib (H) [I48.0]  Deep vein thrombosis (DVT) of distal vein of lower extremity  unspecified chronicity  unspecified laterality (H) [I82.4Z9]  Long term current use of anticoagulants with INR goal of 2.0-3.0 [Z79.01]           Comments:           Anticoagulation Care Providers     Provider Role Specialty Phone number    Dontrell Gómez MD Referring Internal  Medicine [phone]    Anne Heath MD Referring Internal Medicine [phone]

## 2022-08-17 ENCOUNTER — TELEPHONE (OUTPATIENT)
Dept: FAMILY MEDICINE | Facility: CLINIC | Age: 82
End: 2022-08-17

## 2022-08-17 ENCOUNTER — MEDICAL CORRESPONDENCE (OUTPATIENT)
Dept: HEALTH INFORMATION MANAGEMENT | Facility: CLINIC | Age: 82
End: 2022-08-17

## 2022-08-17 DIAGNOSIS — I10 ESSENTIAL HYPERTENSION: ICD-10-CM

## 2022-08-17 RX ORDER — DILTIAZEM HYDROCHLORIDE 300 MG/1
300 CAPSULE, COATED, EXTENDED RELEASE ORAL DAILY
Qty: 90 CAPSULE | Refills: 0 | Status: SHIPPED | OUTPATIENT
Start: 2022-08-17 | End: 2022-10-04

## 2022-08-17 NOTE — TELEPHONE ENCOUNTER
Received refill request for:  Diltiazem  Last OV was: 6/22/21 Dr. TYSON  Labs/EKG: N/a  F/U scheduled:   Date Time Provider Department Center   8/25/2022 10:00 AM Swetha Allen MD [facility]   11/16/2022  2:30 PM SANDOVAL DCRN [facility] PSA CLIN   11/16/2022  3:45 PM Brent Portillo MD [facility] PSA CLIN   12/30/2022  8:00 AM  LAB [facility]   12/30/2022  9:00 AM Tiffany Suero NP [facility]     Pharmacy: [facility] Cardiology Refill Guideline reviewed.  Medication meets criteria for refill.    Martha Mills RN, BSN  08/17/22 at 2:17 PM

## 2022-08-17 NOTE — TELEPHONE ENCOUNTER
The Home Care/Assisted Living/Nursing Facility is calling regarding an established patient.  Has the patient seen Home Care in the past or is currently residing in Assisted Living or Nursing Facility? No.     Rosemarie  calling from [facility]  requesting the following orders that are NOT within the Home Care, Assisted Living or Nursing Home Eval and Treatment standing order and must be ordered by a Licensed Practitioner.    Preferred Call Back Number: [phone]    PT/OT/Speech Therapy    Routing to Licensed Practitioner (Provider) to review request and provide approval or recommendation.    Writer has verified [employer] will send fax to have orders signed.    Tiffany Singh RN  -[facility]

## 2022-08-22 ENCOUNTER — ANTICOAGULATION THERAPY VISIT (OUTPATIENT)
Dept: ANTICOAGULATION | Facility: CLINIC | Age: 82
End: 2022-08-22

## 2022-08-22 DIAGNOSIS — I48.0 PAROXYSMAL A-FIB (H): ICD-10-CM

## 2022-08-22 DIAGNOSIS — Z79.01 LONG TERM CURRENT USE OF ANTICOAGULANTS WITH INR GOAL OF 2.0-3.0: ICD-10-CM

## 2022-08-22 DIAGNOSIS — I82.4Z9 DEEP VEIN THROMBOSIS (DVT) OF DISTAL VEIN OF LOWER EXTREMITY, UNSPECIFIED CHRONICITY, UNSPECIFIED LATERALITY (H): ICD-10-CM

## 2022-08-22 DIAGNOSIS — I26.99 PE (PULMONARY THROMBOEMBOLISM) (H): Primary | ICD-10-CM

## 2022-08-22 LAB — INR (EXTERNAL): 1.5 (ref 0.9–1.1)

## 2022-08-22 NOTE — PROGRESS NOTES
ANTICOAGULATION MANAGEMENT     Erich Craven 82 year old male is on warfarin with subtherapeutic INR result. (Goal INR 2.0-3.0)    Recent labs: (last 7 days)     08/22/22  1639   INR 1.5*       ASSESSMENT       Source(s): Chart Review and Home Care/Facility Nurse       Warfarin doses taken: Warfarin taken as instructed    Diet: Increased greens/vitamin K in diet; ongoing change    New illness, injury, or hospitalization: No    Medication/supplement changes: None noted    Signs or symptoms of bleeding or clotting: No    Previous INR: Subtherapeutic    Additional findings: increased exercise       PLAN     Recommended plan for ongoing change(s) affecting INR     Dosing Instructions: booster dose then Increase your warfarin dose (11.8% change) with next INR in 1 week       Summary  As of 8/22/2022    Full warfarin instructions:  8/22: 10 mg; Otherwise 5 mg every Sun, Thu; 7.5 mg all other days   Next INR check:  8/29/2022             Telephone call with Elvis Curran [facility], home care/facility nurse who verbalizes understanding and agrees to plan    Orders given to  Homecare nurse/facility to recheck    Education provided: Importance of consistent vitamin K intake    Plan made per [facility] anticoagulation protocol    Elvia Godinez, RN  Anticoagulation Clinic  8/22/2022    _______________________________________________________________________     Anticoagulation Episode Summary     Current INR goal:  2.0-3.0   TTR:  56.1 % (1 y)   Target end date:  Indefinite   Send INR reminders to:  [facility]    Indications    PE (pulmonary thromboembolism) (H) [I26.99]  Paroxysmal A-fib (H) [I48.0]  Deep vein thrombosis (DVT) of distal vein of lower extremity  unspecified chronicity  unspecified laterality (H) [I82.4Z9]  Long term current use of anticoagulants with INR goal of 2.0-3.0 [Z79.01]           Comments:           Anticoagulation Care Providers     Provider Role Specialty Phone number    Dontrell Gómez MD  Referring Internal Medicine [phone]    Anne Heath MD Referring Internal Medicine [phone]

## 2022-08-23 ENCOUNTER — TELEPHONE (OUTPATIENT)
Dept: FAMILY MEDICINE | Facility: CLINIC | Age: 82
End: 2022-08-23

## 2022-08-23 DIAGNOSIS — I48.91 ATRIAL FIBRILLATION (H): ICD-10-CM

## 2022-08-23 RX ORDER — WARFARIN SODIUM 5 MG/1
TABLET ORAL
Qty: 120 TABLET | Refills: 1 | Status: SHIPPED | OUTPATIENT
Start: 2022-08-23 | End: 2022-10-04

## 2022-08-23 NOTE — TELEPHONE ENCOUNTER
Reason for Call:  Medication or medication refill:    Do you use a [facility]?  Name of the pharmacy and phone number for the current request:  [id number] - [place], [place] - [address]    Name of the medication requested: Warfarin    Other request: refill request    Can we leave a detailed message on this number? YES    Phone number patient can be reached at: Home number on file [phone] (home)    Best Time: any    Call taken on 8/23/2022 at 8:22 AM by Svetlana Ibarra

## 2022-08-23 NOTE — TELEPHONE ENCOUNTER
ANTICOAGULATION MANAGEMENT:  Medication Refill    Anticoagulation Summary  As of 8/22/2022    Warfarin maintenance plan:  5 mg (5 mg x 1) every Sun, Thu; 7.5 mg (5 mg x 1.5) all other days   Next INR check:  8/29/2022   Target end date:  Indefinite    Indications    PE (pulmonary thromboembolism) (H) [I26.99]  Paroxysmal A-fib (H) [I48.0]  Deep vein thrombosis (DVT) of distal vein of lower extremity  unspecified chronicity  unspecified laterality (H) [I82.4Z9]  Long term current use of anticoagulants with INR goal of 2.0-3.0 [Z79.01]             Anticoagulation Care Providers     Provider Role Specialty Phone number    Dontrell Gómez MD Referring Internal Medicine [phone]    Anne Heath MD Referring Internal Medicine [phone]          Visit with referring provider/group within last year: Yes    [facility] referral signed within last year: Yes    Erich meets all criteria for refill (current [facility] referral, office visit with referring provider/group in last year, lab monitoring up to date or not exceeding 2 weeks overdue). Rx instructions and quantity supplied updated to match patient's current dosing plan. Warfarin 90 day supply with 1 refill granted per [facility] protocol     Roosevelt Badillo RN  Anticoagulation Clinic

## 2022-08-24 ASSESSMENT — ENCOUNTER SYMPTOMS
MUSCLE WEAKNESS: 0
NECK PAIN: 1
JOINT SWELLING: 0
BACK PAIN: 0
MUSCLE CRAMPS: 0
STIFFNESS: 0
MYALGIAS: 1
ARTHRALGIAS: 1

## 2022-08-25 ENCOUNTER — VIRTUAL VISIT (OUTPATIENT)
Dept: ANESTHESIOLOGY | Facility: CLINIC | Age: 82
End: 2022-08-25
Payer: MEDICARE

## 2022-08-25 DIAGNOSIS — G89.29 CHRONIC LEFT SHOULDER PAIN: Primary | ICD-10-CM

## 2022-08-25 DIAGNOSIS — M25.512 CHRONIC LEFT SHOULDER PAIN: Primary | ICD-10-CM

## 2022-08-25 PROCEDURE — 99214 OFFICE O/P EST MOD 30 MIN: CPT | Mod: 95 | Performed by: ANESTHESIOLOGY

## 2022-08-25 RX ORDER — DIAZEPAM 5 MG
5 TABLET ORAL ONCE
Status: CANCELLED | OUTPATIENT
Start: 2022-08-25 | End: 2022-08-25

## 2022-08-25 ASSESSMENT — PAIN SCALES - GENERAL: PAINLEVEL: MODERATE PAIN (4)

## 2022-08-25 NOTE — PROGRESS NOTES
Video call duration 20 min     The patient is a 82-year-old male with past medical history significant for primary osteoarthritis of the both shoulders, chronic bilateral shoulder pain (right greater than the left), RIN, COPD, obesity with BMI 39, hypertension, paroxysmal A. fib status post pacemaker and DVT.  His pain located in the anterior aspect bilateral shoulder and occasionally pain radiating to bilateral upper arms and to elbows.  Pain increases with reaching overhead, reaching behind the back, putting on jacket forgetting and lifting.  He is unable to lift more than 90 degrees in both shoulders (left greater than right).  He notes that pain gradually gets worse throughout the day.  He has been taking Tylenol 3 with good pain relief.  He states that he takes only at night.  He was scheduled for the shoulder replacement surgery however it was canceled due to his COPD exacerbation and upper respiratory infection.  He is still planning on right shoulder surgery.  Date is not confirmed yet.     He came to us for better pain control.  In the past he had performed multiple shoulder injections with good pain relief.  He had multiple suprascapular nerve block with me with good pain relief.  He stated right-sided shoulder pain is much better.  However left shoulder is hurting a lot recently.  He requests for left suprascapular nerve block      Physical examination     General: Pleasant in conversation.  In no acute distress  Respiration: No respiratory distress noted  Musculoskeletal: Patient states that he was able to lift his both arms up to 90 degrees.  Neurologic: Alert awake oriented  Psych: Mentation intact        Diagnosis     Severe degenerative joint disease of the both shoulder (right greater than the left)  Chronic right shoulder pain  COPD  A. fib status post pacemaker  RIN  BMI to 39  History of DVT    Diagnoses       Codes Comments    Chronic left shoulder pain    -  Primary M25.512, G89.29          Plan       Tramadol 25 mg twice daily.    No medication dispensed today       Ordered left suprascapular nerve block.  Risks of the procedure including bleeding, infection, failed procedure, increased pain, pneumothorax discussed with the patient       Follow-up as needed        Swetha Allen MD, PhD     [facility] [address]  [address] [facility] [id number] [phone]  Dept: [phone]               Medications:  Current Outpatient Medications   Medication Sig Dispense Refill     acetaminophen (TYLENOL) 500 MG tablet Take 1,000 mg by mouth 4 times daily as needed       Acetylcarnitine HCl (ACETYL L-CARNITINE PO) Take 1 capsule by mouth daily       albuterol (PROAIR HFA/PROVENTIL HFA/VENTOLIN HFA) 108 (90 Base) MCG/ACT inhaler Inhale 2 puffs into the lungs 4 times daily as needed for shortness of breath / dyspnea or wheezing       aspirin (ASA) 81 MG chewable tablet Take 1 tablet by mouth daily       atorvastatin (LIPITOR) 40 MG tablet TAKE 1 TABLET BY MOUTH EVERY EVENING 90 tablet 2     carvedilol (COREG) 12.5 MG tablet TAKE 1 TABLET BY MOUTH TWICE A  tablet 2     co-enzyme Q-10 100 MG CAPS capsule Take 100 mg by mouth daily       diltiazem ER COATED BEADS (CARDIZEM CD/CARTIA XT) 300 MG 24 hr capsule Take 1 capsule (300 mg) by mouth daily 90 capsule 0     Fluticasone-Umeclidin-Vilanterol (TRELEGY ELLIPTA) 200-62.5-25 MCG/INH oral inhaler Inhale 1 puff into the lungs daily       furosemide (LASIX) 20 MG tablet Take 2 tabs in morning and 1 tab in the sfternoon 360 tablet 3     isosorbide mononitrate (IMDUR) 30 MG 24 hr tablet Take 1 tablet (30 mg) by mouth daily 90 tablet 0     levothyroxine (SYNTHROID/LEVOTHROID) 175 MCG tablet Take 1 tablet (175 mcg) by mouth daily 90 tablet 0     lisinopril (ZESTRIL) 20 MG tablet Take 1 tablet (20 mg) by mouth daily 90 tablet 3     metolazone (ZAROXOLYN) 2.5 MG tablet Take 1 tablet (2.5  mg) by mouth 2 times daily 180 tablet 0     Multiple vitamin TABS Take 1 tablet by mouth daily       spironolactone (ALDACTONE) 25 MG tablet Take 1 tablet (25 mg) by mouth every other day 45 tablet 2     tamsulosin (FLOMAX) 0.4 MG capsule Take 1 capsule (0.4 mg) by mouth daily 90 capsule 2     TAURINE PO Take 1 capsule by mouth daily       venlafaxine (EFFEXOR) 37.5 MG tablet Take 1 tablet (37.5 mg) by mouth 2 times daily 180 tablet 0     warfarin ANTICOAGULANT (COUMADIN) 5 MG tablet Take by mouth 5 mg every Sunday & Thursday; 7.5 mg all other days of the week or as directed by the Anticoagulation Clinic 120 tablet 1       Analgesic Medications:   Medications related to Pain Management (From now, onward)    None             LABORATORY VALUES:   Recent Labs   Lab Test 08/02/22  0829 07/14/22  2327    134   POTASSIUM 3.6 3.7   CHLORIDE 97 95   CO2 31 31   ANIONGAP 8 8   * 134*   BUN 32* 43*   CR 1.01 1.59*   LAWSON 9.3 8.7       CBC RESULTS:   Recent Labs   Lab Test 08/02/22  0829   WBC 10.1   RBC 4.14*   HGB 12.6*   HCT 39.6*   MCV 96   MCH 30.4   MCHC 31.8   RDW 14.8          Most Recent 3 INR's:  Recent Labs   Lab Test 08/22/22  1639 08/15/22  1102 08/08/22  1042   INR 1.5* 1.8* 1.4*           Answers for HPI/ROS submitted by the patient on 8/24/2022  General Symptoms: No  Skin Symptoms: No  HENT Symptoms: No  EYE SYMPTOMS: No  HEART SYMPTOMS: No  LUNG SYMPTOMS: No  INTESTINAL SYMPTOMS: No  URINARY SYMPTOMS: No  REPRODUCTIVE SYMPTOMS: No  SKELETAL SYMPTOMS: Yes  BLOOD SYMPTOMS: No  NERVOUS SYSTEM SYMPTOMS: No  MENTAL HEALTH SYMPTOMS: No  Back pain: No  Muscle aches: Yes  Neck pain: Yes  Swollen joints: No  Joint pain: Yes  Bone pain: Yes  Muscle cramps: No  Muscle weakness: No  Joint stiffness: No  Bone fracture: No

## 2022-08-25 NOTE — LETTER
8/25/2022       RE: Erich Craven  [address]  [facility] [address]     Dear Colleague,    Thank you for referring your patient, Erich Craven, to the [facility] [place] at [facility]. Please see a copy of my visit note below.    Jaun is a 82 year old who is being evaluated via a billable video visit.      How would you like to obtain your AVS? MyChart  If the video visit is dropped, the invitation should be resent by: Text to cell phone: [phone]  Will anyone else be joining your video visit? No            Video call duration 20 min     The patient is a 82-year-old male with past medical history significant for primary osteoarthritis of the both shoulders, chronic bilateral shoulder pain (right greater than the left), RIN, COPD, obesity with BMI 39, hypertension, paroxysmal A. fib status post pacemaker and DVT.  His pain located in the anterior aspect bilateral shoulder and occasionally pain radiating to bilateral upper arms and to elbows.  Pain increases with reaching overhead, reaching behind the back, putting on jacket forgetting and lifting.  He is unable to lift more than 90 degrees in both shoulders (left greater than right).  He notes that pain gradually gets worse throughout the day.  He has been taking Tylenol 3 with good pain relief.  He states that he takes only at night.  He was scheduled for the shoulder replacement surgery however it was canceled due to his COPD exacerbation and upper respiratory infection.  He is still planning on right shoulder surgery.  Date is not confirmed yet.     He came to us for better pain control.  In the past he had performed multiple shoulder injections with good pain relief.  He had multiple suprascapular nerve block with me with good pain relief.  He stated right-sided shoulder pain is much better.  However left shoulder is hurting a lot recently.   He requests for left suprascapular nerve block      Physical examination     General: Pleasant in conversation.  In no acute distress  Respiration: No respiratory distress noted  Musculoskeletal: Patient states that he was able to lift his both arms up to 90 degrees.  Neurologic: Alert awake oriented  Psych: Mentation intact        Diagnosis     Severe degenerative joint disease of the both shoulder (right greater than the left)  Chronic right shoulder pain  COPD  A. fib status post pacemaker  RIN  BMI to 39  History of DVT    Diagnoses       Codes Comments    Chronic left shoulder pain    -  Primary M25.512, G89.29         Plan       Tramadol 25 mg twice daily.    No medication dispensed today       Ordered left suprascapular nerve block.  Risks of the procedure including bleeding, infection, failed procedure, increased pain, pneumothorax discussed with the patient       Follow-up as needed        Swetha Allen MD, PhD     [facility] [address]  [address] [facility] [address] [phone]  Dept: [phone]               Medications:  Current Outpatient Medications   Medication Sig Dispense Refill     acetaminophen (TYLENOL) 500 MG tablet Take 1,000 mg by mouth 4 times daily as needed       Acetylcarnitine HCl (ACETYL L-CARNITINE PO) Take 1 capsule by mouth daily       albuterol (PROAIR HFA/PROVENTIL HFA/VENTOLIN HFA) 108 (90 Base) MCG/ACT inhaler Inhale 2 puffs into the lungs 4 times daily as needed for shortness of breath / dyspnea or wheezing       aspirin (ASA) 81 MG chewable tablet Take 1 tablet by mouth daily       atorvastatin (LIPITOR) 40 MG tablet TAKE 1 TABLET BY MOUTH EVERY EVENING 90 tablet 2     carvedilol (COREG) 12.5 MG tablet TAKE 1 TABLET BY MOUTH TWICE A  tablet 2     co-enzyme Q-10 100 MG CAPS capsule Take 100 mg by mouth daily       diltiazem ER COATED BEADS (CARDIZEM CD/CARTIA XT) 300 MG 24 hr capsule Take 1  capsule (300 mg) by mouth daily 90 capsule 0     Fluticasone-Umeclidin-Vilanterol (TRELEGY ELLIPTA) 200-62.5-25 MCG/INH oral inhaler Inhale 1 puff into the lungs daily       furosemide (LASIX) 20 MG tablet Take 2 tabs in morning and 1 tab in the sfternoon 360 tablet 3     isosorbide mononitrate (IMDUR) 30 MG 24 hr tablet Take 1 tablet (30 mg) by mouth daily 90 tablet 0     levothyroxine (SYNTHROID/LEVOTHROID) 175 MCG tablet Take 1 tablet (175 mcg) by mouth daily 90 tablet 0     lisinopril (ZESTRIL) 20 MG tablet Take 1 tablet (20 mg) by mouth daily 90 tablet 3     metolazone (ZAROXOLYN) 2.5 MG tablet Take 1 tablet (2.5 mg) by mouth 2 times daily 180 tablet 0     Multiple vitamin TABS Take 1 tablet by mouth daily       spironolactone (ALDACTONE) 25 MG tablet Take 1 tablet (25 mg) by mouth every other day 45 tablet 2     tamsulosin (FLOMAX) 0.4 MG capsule Take 1 capsule (0.4 mg) by mouth daily 90 capsule 2     TAURINE PO Take 1 capsule by mouth daily       venlafaxine (EFFEXOR) 37.5 MG tablet Take 1 tablet (37.5 mg) by mouth 2 times daily 180 tablet 0     warfarin ANTICOAGULANT (COUMADIN) 5 MG tablet Take by mouth 5 mg every Sunday & Thursday; 7.5 mg all other days of the week or as directed by the Anticoagulation Clinic 120 tablet 1       Analgesic Medications:   Medications related to Pain Management (From now, onward)    None             LABORATORY VALUES:   Recent Labs   Lab Test 08/02/22  0829 07/14/22  2327    134   POTASSIUM 3.6 3.7   CHLORIDE 97 95   CO2 31 31   ANIONGAP 8 8   * 134*   BUN 32* 43*   CR 1.01 1.59*   LAWSON 9.3 8.7       CBC RESULTS:   Recent Labs   Lab Test 08/02/22  0829   WBC 10.1   RBC 4.14*   HGB 12.6*   HCT 39.6*   MCV 96   MCH 30.4   MCHC 31.8   RDW 14.8          Most Recent 3 INR's:  Recent Labs   Lab Test 08/22/22  1639 08/15/22  1102 08/08/22  1042   INR 1.5* 1.8* 1.4*                 Again, thank you for allowing me to participate in the care of your patient.       Sincerely,    Swetha Allen MD

## 2022-08-25 NOTE — NURSING NOTE
Patient presents with:  Follow Up: Follow-up Video Left Shoulder pain level 4/10      Moderate Pain (4)     Pain Medications     Analgesics Other Refills Start End     acetaminophen (TYLENOL) 500 MG tablet          Sig - Route: Take 1,000 mg by mouth 4 times daily as needed - Oral    Class: Historical    Salicylates Refills Start End     aspirin (ASA) 81 MG chewable tablet     11/27/2015     Sig - Route: Take 1 tablet by mouth daily - Oral    Class: Historical          What medications are you using for pain? Tylenol    (New patients only) Have you been seen by another pain clinic/ provider? no    (Return Patients only) What refills are you needing today? no

## 2022-08-25 NOTE — PROGRESS NOTES
Jaun is a 82 year old who is being evaluated via a billable video visit.      How would you like to obtain your AVS? MyChart  If the video visit is dropped, the invitation should be resent by: Text to cell phone: [phone]  Will anyone else be joining your video visit? No

## 2022-08-25 NOTE — PATIENT INSTRUCTIONS
Medications:    Trial Tramadol 25 mg twice daily. The dose should not be increased as the patient is on sertraline, therefore he is at risk of serotonin syndrome.      Please provide the clinic with a minium of 1 week notice, on all prescription refills.       Procedures: Left Suprascapular nerve block        Call to schedule your procedure: [phone] option #2     Case Request: BLOCK, NERVE, SUPRASCAPULAR- left               Patient Class: Outpatient, Location: [facility] [place]  Panel 1: Swetha Allen MD  BLOCK, NERVE, SUPRASCAPULAR- left, Laterality: Left, Anesthesia Type: Local     Follow up:    Follow up 2 weeks after procedure.        Procedure Information related to COVID-19     Please call [phone] to schedule, reschedule, or cancel your procedure appointment.   Phones are answered Monday - Friday from 08:00 - 4:30pm.  Leave a voicemail with your name, birth date, and phone number if no one is available to take your call.         You will need to be tested for COVID-19 before your procedure. If you're going home on the same day as your procedure (surgery), you may take an at-home rapid antigen test 1 to 2 days before your procedure. If your test is negative, please take a photo of the test. Show the photo to the nurse when you come in for your procedure. If your test is positive, please update our office right away and your procedure may have to be postponed.     If you do not have access to an at-home rapid test, you will have to be tested at a lab within 4 days of your procedure.  You will be called to schedule your COVID test by a central scheduling team. If you have not been contacted to schedule your test within 4 days of the scheduled procedure, call [phone]     Please be aware that the turn around time for the test is approximately 24-72 hours.   If your results are still pending the day of your procedure, you will be notified as soon as possible as the procedure may be cancelled.     Please note:  You will only be contacted for positive and pending results.      The procedure center staff will call you several days before the procedure to review important information that you will need to know for the day of the procedure.   Please contact the clinic if you have further questions about this information.         Information related to Scheduling and Pre-Procedure Instructions:    If you must reschedule your procedure more than two times, you must follow up in clinic before rescheduling again.    Preparing for your procedure    CAUTION - FAILURE TO FOLLOW THESE PRE-PROCEDURE INSTRUCTIONS WILL RESULT IN YOUR PROCEDURE BEING RESCHEDULED.    Your Procedure: Left Suprascapular Nerve Block            You must have a  take you home after your procedure. Transportation by taxi or para-transit is okay as long as you have a responsible adult accompany you. You must provide your 's full name and contact number at time of check in.     Fasting Protocol Please have nothing to eat and drink for 2 hours before the procedure.      Medications If you take any medications, DO NOT STOP. Take your medications as usual the day of your procedure with a sip of water AT LEAST 2 HOURS PRIOR TO ARRIVAL.    Antibiotics If you are currently taking antibiotics, you must complete the entire dose 7 days prior to your scheduled procedure. You must be clear of any signs or symptoms of infection. If you begin antibiotics, please contact our clinic for instructions.     Fever, Chills, or Rash If you experience a fever of higher than 100 degrees, chills, rash, or open wounds during the one week before your procedure, please call the clinic to see if you may proceed with your procedure.      Medication Hold List  **Patients under Cardiology/Neurology care should consult their provider prior to the pain procedure to verify pre-procedure medication instructions. The information below contains general guidelines.**      Hold  Coumadin for 5 days before your procedure.    Blood Thinners If you are taking daily ASPIRIN, PLAVIX, OR OTHER BLOOD THINNERS SUCH AS COUMADIN/WARFARIN, we will need your prescribing doctor to sign a release permitting you to stop these medications. Once approved by your prescribing doctor - STOP ALL BLOOD THINNERS BASED ON THE TIME TABLE BELOW PRIOR TO YOUR PROCEDURE. If you have been instructed to stop WARFARIN(COUMADIN), you must have an INR lab drawn the day before your procedure. . Your INR must be within normal limits before we can perform your injection. MEDICATIONS CAN BE RESTARTED AFTER YOUR PROCEDURE.    14 DAY HOLD  Ticlid (ticlopidine)    10 DAY HOLD  Effient (Prasugel)    3 DAY HOLD  Xarelto (rivaroxaban) 7 DAY HOLD  Anacin, Bufferin, Ecotrin, Excedrin, Aggrenox (Aspirin)  Brilinta (ticagrelor)  Coumadin (Warfarin)  Pradexa (Dabigatran)  Elmiron (Pentosan)  Plavix (Clopidogrel Bisulfate)  Pletal (Cilostazol)    24 HOUR HOLD  Lovenox (enoxaparin)  Agrylin (Anagrelide)        Non-steroidal Anti-inflammatories (NSAIDs) DO NOT TAKE any non-steroidal anti-inflammatory medications (NSAIDs) listed on the table below. MEDICATIONS CAN BE RESTARTED AFTER YOUR PROCEDURE. Celebrex is OK to take and does not need to be discontinued.     Medications to stop:  3 DAY HOLD  Advil, Motrin (Ibuprofen)  Arthrotec (diciofenac sodium/misoprostol)  Clinoril (Sulindac)  Indocin (Indomethacin)  Lodine (Etodolac)  Toradol (Ketorolac)  Vicoprofen (Hydrocodone and Ibuprofen)  Voltaren (Diclotenac)    14 DAY HOLD  Daypro (Oxaprozin)  Feldene (Piroxicam)   7 DAY HOLD  Aleve (Naproxen sodium)  Darvon compound (contains aspirin)  Naprosyn (Naproxen)  Norgesic Forte (contains aspirin)  Mobic (Meloxicam)  Oruvall (Ketoprofen)  Percodan (contains aspirin)  Relafen (Nabumetone)  Salsalate  Trilisate  Vitamin E (more than 400 mg per day)  Any medication containing aspirin                To speak with a nurse, schedule/reschedule/cancel a  clinic appointment, or request a medication refill call: [phone]    You can also reach us by theAudience: [email or web address]

## 2022-08-29 ENCOUNTER — TELEPHONE (OUTPATIENT)
Dept: ANTICOAGULATION | Facility: CLINIC | Age: 82
End: 2022-08-29

## 2022-08-29 ENCOUNTER — MEDICAL CORRESPONDENCE (OUTPATIENT)
Dept: HEALTH INFORMATION MANAGEMENT | Facility: CLINIC | Age: 82
End: 2022-08-29

## 2022-08-29 ENCOUNTER — ANTICOAGULATION THERAPY VISIT (OUTPATIENT)
Dept: ANTICOAGULATION | Facility: CLINIC | Age: 82
End: 2022-08-29

## 2022-08-29 DIAGNOSIS — I26.99 PE (PULMONARY THROMBOEMBOLISM) (H): Primary | ICD-10-CM

## 2022-08-29 DIAGNOSIS — I82.4Z9 DEEP VEIN THROMBOSIS (DVT) OF DISTAL VEIN OF LOWER EXTREMITY, UNSPECIFIED CHRONICITY, UNSPECIFIED LATERALITY (H): ICD-10-CM

## 2022-08-29 DIAGNOSIS — I48.0 PAROXYSMAL A-FIB (H): ICD-10-CM

## 2022-08-29 DIAGNOSIS — Z79.01 LONG TERM CURRENT USE OF ANTICOAGULANTS WITH INR GOAL OF 2.0-3.0: ICD-10-CM

## 2022-08-29 LAB — INR (EXTERNAL): 3.2 (ref 0.9–1.1)

## 2022-08-29 NOTE — TELEPHONE ENCOUNTER
LAVERNE-PROCEDURAL ANTICOAGULATION  MANAGEMENT    ASSESSMENT     Warfarin interruption plan for suprascapular nerve block on date TBD.    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    Tolerated suprascapular nerve blocks 2x 2021 with no bridge    [facility]ver[facility]-Procedure Risk stratification for thromboembolism: moderate (2017 ACC periprocedure pathway for NVAF Expert Consensus and 2018 American Society of Hematology guideline)    VTE: 2018 American Society of Hematology recommends against bridging in low and moderate risk VTE patients holding warfarin    VTE: 2016 Anticoagulation Forum clinical guidance recommends no bridge therapy for most VTE patients interrupting warfarin with possible exceptions for VTE within previous month, prior hx recurrent VTE during anticoagulation therapy interruption, or undergoing a procedure with high for VTE  (joint replacement surgery or major abdominal cancer resection)     NVAF: 2017 ACC periprocedure pathway for NVAF advises likely NO bridge for moderate risk stratification ([id number] score 5-6)  without a hx of stroke, TIA or systemic embolism    This is a low VTE risk procedure    RECOMMENDATION       Pre-Procedure:  o Hold warfarin for 7 days, until after procedure    o No Bridge      Post-Procedure:  o Resume warfarin dose if okay with provider doing procedure on night of procedure, PM: 10mg  o Recheck INR ~ 7 days after resuming warfarin       Plan routed to referring provider for approval  ?   Sherin Santillan RPH    SUBJECTIVE/OBJECTIVE     Erich Craven, a 82 year old male    Goal INR Range: 2.0-3.0     Patient bridged in past: Yes: with high risk procedures, most recently 07/2022 shoulder surgery due to history provoked PE with orthopedic surgery  "2012      Wt Readings from Last 3 Encounters:   06/28/22 118.8 kg (262 lb)   05/03/22 119.6 kg (263 lb 11.2 oz)   03/29/22 117.9 kg (260 lb)      Ideal body weight: 73 kg (160 lb 15 oz)  Adjusted ideal body weight: 91.3 kg (201 lb 5.8 oz)     Estimated body mass index is 37.59 kg/m  as calculated from the following:    Height as of 5/3/22: 1.778 m (5' 10\").    Weight as of 6/28/22: 118.8 kg (262 lb).    Lab Results   Component Value Date    INR 3.2 (A) 08/29/2022    INR 1.5 (A) 08/22/2022    INR 1.8 (A) 08/15/2022     Lab Results   Component Value Date    HGB 12.6 08/02/2022    HGB 12.2 06/14/2021    HCT 39.6 08/02/2022    HCT 36.7 06/14/2021     08/02/2022     06/14/2021     Lab Results   Component Value Date    CR 1.01 08/02/2022    CR 1.59 (H) 07/14/2022    CR 1.16 05/23/2022     Estimated Creatinine Clearance: 71.6 mL/min (based on SCr of 1.01 mg/dL).  "

## 2022-08-29 NOTE — PROGRESS NOTES
ANTICOAGULATION MANAGEMENT     Erich Craven 82 year old male is on warfarin with supratherapeutic INR result. (Goal INR 2.0-3.0)    Recent labs: (last 7 days)     08/29/22  1139   INR 3.2*       ASSESSMENT       Source(s): Chart Review and Home Care/Facility Nurse       Warfarin doses taken: Warfarin taken as instructed    Diet: Patient has increased his greens intake, home care is unsure how many servings. Education on consistency.    New illness, injury, or hospitalization: No    Medication/supplement changes: None noted    Signs or symptoms of bleeding or clotting: No    Previous INR: Subtherapeutic    Additional findings: Upcoming surgery/procedure Nerve block procedure, patient needs to schedule. advised to report to [facility] as soon as procedure is scheduled, bridge plan request sent to [facility] in separate telephone encounter on 8/29/22.       PLAN     Recommended plan for no diet, medication or health factor changes affecting INR     Dosing Instructions: Continue your current warfarin dose with next INR in 10 days       Summary  As of 8/29/2022    Full warfarin instructions:  5 mg every Sun, Thu; 7.5 mg all other days   Next INR check:               Telephone call with [facility] home care nurse who verbalizes understanding and agrees to plan    Orders given to  Homecare nurse/facility to recheck    Education provided: Please call back if any changes to your diet, medications or how you've been taking warfarin    Plan made per [facility] anticoagulation protocol    Katelyn Stoner RN  Anticoagulation Clinic  8/29/2022    _______________________________________________________________________     Anticoagulation Episode Summary     Current INR goal:  2.0-3.0   TTR:  55.3 % (1 y)   Target end date:  Indefinite   Send INR reminders to:  [facility]    Indications    PE (pulmonary thromboembolism) (H) [I26.99]  Paroxysmal A-fib (H) [I48.0]  Deep vein thrombosis (DVT) of distal vein of lower extremity  unspecified  chronicity  unspecified laterality (H) [I82.4Z9]  Long term current use of anticoagulants with INR goal of 2.0-3.0 [Z79.01]           Comments:           Anticoagulation Care Providers     Provider Role Specialty Phone number    Dontrell Gómez MD Referring Internal Medicine [phone]    Anne Heath MD Referring Internal Medicine [phone]

## 2022-08-29 NOTE — TELEPHONE ENCOUNTER
Patient has a Procedures: Left Suprascapular nerve block                        Case Request: BLOCK, NERVE, SUPRASCAPULAR- left                          Patient Class: Outpatient, [place] [facility] [place]  Panel 1: Swetha Allen MD  BLOCK, NERVE, SUPRASCAPULAR- left, Laterality: Left, Anesthesia Type: Local     Per directions in 8/25/22 Mychart encounter, patient is to hold warfarin for 7 days prior to procedure. Patient has not scheduled this procedure at this time.    Routing to [facility] to review bridge plan if needed.    Katelyn Stoner RN

## 2022-08-30 ENCOUNTER — MEDICAL CORRESPONDENCE (OUTPATIENT)
Dept: HEALTH INFORMATION MANAGEMENT | Facility: CLINIC | Age: 82
End: 2022-08-30

## 2022-08-30 PROBLEM — M25.512 CHRONIC LEFT SHOULDER PAIN: Status: ACTIVE | Noted: 2022-08-30

## 2022-08-30 PROBLEM — G89.29 CHRONIC LEFT SHOULDER PAIN: Status: ACTIVE | Noted: 2022-08-30

## 2022-08-30 NOTE — TELEPHONE ENCOUNTER
Spoke with pt, informed that we have his warfarin interruption plan ready and to call [facility] when he has a scheduled date for procedure so we can advise on specific dates for holding warfarin.     Roosevelt Badillo RN

## 2022-09-02 ENCOUNTER — TELEPHONE (OUTPATIENT)
Dept: FAMILY MEDICINE | Facility: CLINIC | Age: 82
End: 2022-09-02

## 2022-09-02 NOTE — TELEPHONE ENCOUNTER
Rosemarie, PT, calling to request orders for early discharge per patient request and patient has met all goals.

## 2022-09-06 ENCOUNTER — ANTICOAGULATION THERAPY VISIT (OUTPATIENT)
Dept: ANTICOAGULATION | Facility: CLINIC | Age: 82
End: 2022-09-06

## 2022-09-06 ENCOUNTER — MEDICAL CORRESPONDENCE (OUTPATIENT)
Dept: HEALTH INFORMATION MANAGEMENT | Facility: CLINIC | Age: 82
End: 2022-09-06

## 2022-09-06 ENCOUNTER — TRANSFERRED RECORDS (OUTPATIENT)
Dept: FAMILY MEDICINE | Facility: CLINIC | Age: 82
End: 2022-09-06

## 2022-09-06 DIAGNOSIS — I26.99 PE (PULMONARY THROMBOEMBOLISM) (H): Primary | ICD-10-CM

## 2022-09-06 DIAGNOSIS — I82.4Z9 DEEP VEIN THROMBOSIS (DVT) OF DISTAL VEIN OF LOWER EXTREMITY, UNSPECIFIED CHRONICITY, UNSPECIFIED LATERALITY (H): ICD-10-CM

## 2022-09-06 DIAGNOSIS — I48.0 PAROXYSMAL A-FIB (H): ICD-10-CM

## 2022-09-06 DIAGNOSIS — Z79.01 LONG TERM CURRENT USE OF ANTICOAGULANTS WITH INR GOAL OF 2.0-3.0: ICD-10-CM

## 2022-09-06 LAB — INR (EXTERNAL): 3.4 (ref 0.9–1.1)

## 2022-09-06 NOTE — PROGRESS NOTES
ANTICOAGULATION MANAGEMENT     Erich Craven 82 year old male is on warfarin with supratherapeutic INR result. (Goal INR 2.0-3.0)    Recent labs: (last 7 days)     09/06/22  1214   INR 3.4*         ASSESSMENT       Source(s): Chart Review and Home Care/Facility Nurse       Warfarin doses taken: Warfarin taken as instructed    Diet: No - possible whey protein but home care nurse can not confirm if this was stopped and then restarted     New illness, injury, or hospitalization: No    Medication/supplement changes: None noted    Signs or symptoms of bleeding or clotting: No    Previous INR: Supratherapeutic    Additional findings: Upcoming surgery/procedure Unknown date at this time - waiting for call back from cardiology       PLAN     Recommended plan for temporary change(s) affecting INR     Dosing Instructions: partial hold then decrease your warfarin dose (5% change) with next INR in 1 week       Summary  As of 9/6/2022    Full warfarin instructions:  9/6: 2.5 mg; Otherwise 5 mg every Mon, Wed, Fri; 7.5 mg all other days   Next INR check:  9/12/2022             Telephone call with [facility] home care nurse who agrees to plan and repeated back plan correctly    Orders given to  Homecare nurse/facility to recheck    Education provided: Importance of notifying clinic of upcoming surgeries and procedures 2 weeks in advance and Contact [phone]  with any changes, questions or concerns.     Plan made per [facility] anticoagulation protocol    Clara Pitst, RN  Anticoagulation Clinic  9/6/2022    _______________________________________________________________________     Anticoagulation Episode Summary     Current INR goal:  2.0-3.0   TTR:  52.8 % (1 y)   Target end date:  Indefinite   Send INR reminders to:  [facility]ER[facility]EDA    Indications    PE (pulmonary thromboembolism) (H) [I26.99]  Paroxysmal A-fib (H) [I48.0]  Deep vein thrombosis (DVT) of distal vein of lower extremity  unspecified chronicity  unspecified laterality (H)  [I82.4Z9]  Long term current use of anticoagulants with INR goal of 2.0-3.0 [Z79.01]           Comments:           Anticoagulation Care Providers     Provider Role Specialty Phone number    Dontrell Gómez MD Referring Internal Medicine [phone]    Anne Heath MD Referring Internal Medicine [phone]

## 2022-09-08 ENCOUNTER — TELEPHONE (OUTPATIENT)
Dept: ANESTHESIOLOGY | Facility: CLINIC | Age: 82
End: 2022-09-08

## 2022-09-08 NOTE — TELEPHONE ENCOUNTER
Health Call Center    Phone Message    May a detailed message be left on voicemail: yes     Reason for Call: Other: The patient is returning a call to Linda in Dr Baltazar office. He has additional questions on his medical issues and pain that they have been discussing. He would like a call back at [phone] asap to discuss these issues and the pain he is having.     Action Taken: Message routed to:  [facility] ([facility] Pain    Travel Screening: Not Applicable

## 2022-09-09 ENCOUNTER — TRANSFERRED RECORDS (OUTPATIENT)
Dept: HEALTH INFORMATION MANAGEMENT | Facility: CLINIC | Age: 82
End: 2022-09-09

## 2022-09-09 RX ORDER — TRAMADOL HYDROCHLORIDE 50 MG/1
25 TABLET ORAL 2 TIMES DAILY PRN
Qty: 30 TABLET | Refills: 0 | Status: ON HOLD | OUTPATIENT
Start: 2022-09-09 | End: 2022-09-19

## 2022-09-09 NOTE — TELEPHONE ENCOUNTER
[employer] message received from patient with medication request for Tramadol. Dr. Allen reviewed and sent over a prescription to patient's pharmacy. Patient updated via [employer] message.    Linda Serrano RN

## 2022-09-12 ENCOUNTER — LAB (OUTPATIENT)
Dept: URGENT CARE | Facility: URGENT CARE | Age: 82
End: 2022-09-12
Payer: MEDICARE

## 2022-09-12 ENCOUNTER — ANTICOAGULATION THERAPY VISIT (OUTPATIENT)
Dept: ANTICOAGULATION | Facility: CLINIC | Age: 82
End: 2022-09-12

## 2022-09-12 DIAGNOSIS — I82.4Z9 DEEP VEIN THROMBOSIS (DVT) OF DISTAL VEIN OF LOWER EXTREMITY, UNSPECIFIED CHRONICITY, UNSPECIFIED LATERALITY (H): ICD-10-CM

## 2022-09-12 DIAGNOSIS — Z20.822 ENCOUNTER FOR LABORATORY TESTING FOR COVID-19 VIRUS: ICD-10-CM

## 2022-09-12 DIAGNOSIS — Z79.01 LONG TERM CURRENT USE OF ANTICOAGULANTS WITH INR GOAL OF 2.0-3.0: ICD-10-CM

## 2022-09-12 DIAGNOSIS — I26.99 PE (PULMONARY THROMBOEMBOLISM) (H): Primary | ICD-10-CM

## 2022-09-12 DIAGNOSIS — I48.0 PAROXYSMAL A-FIB (H): ICD-10-CM

## 2022-09-12 LAB
INR (EXTERNAL): 1.7 (ref 0.9–1.1)
SARS-COV-2 RNA RESP QL NAA+PROBE: NEGATIVE

## 2022-09-12 PROCEDURE — U0005 INFEC AGEN DETEC AMPLI PROBE: HCPCS

## 2022-09-12 PROCEDURE — U0003 INFECTIOUS AGENT DETECTION BY NUCLEIC ACID (DNA OR RNA); SEVERE ACUTE RESPIRATORY SYNDROME CORONAVIRUS 2 (SARS-COV-2) (CORONAVIRUS DISEASE [COVID-19]), AMPLIFIED PROBE TECHNIQUE, MAKING USE OF HIGH THROUGHPUT TECHNOLOGIES AS DESCRIBED BY CMS-2020-01-R: HCPCS

## 2022-09-12 NOTE — PROGRESS NOTES
ANTICOAGULATION MANAGEMENT     Erich Craven 82 year old male is on warfarin with subtherapeutic INR result. (Goal INR 2.0-3.0)    Recent labs: (last 7 days)     09/06/22  1214   INR 3.4*       ASSESSMENT       Source(s): Chart Review, Patient/Caregiver Call and Home Care/Facility Nurse       Warfarin doses taken: Warfarin taken as instructed    Diet: No new diet changes identified    New illness, injury, or hospitalization: No    Medication/supplement changes: None noted    Signs or symptoms of bleeding or clotting: No    Previous INR: Supratherapeutic    Additional findings: Upcoming surgery/procedure 9/15/22 SEE 8/29 TE for hold instructions for suprascapular nerve block       PLAN     Recommended plan for temporary change(s) affecting INR     Dosing Instructions: hold mon-wed for procedure, resume on 9/15/22 with 10 mg then resume mainteance on 9/16 with next INR in 10 days       Summary  As of 9/12/2022    Full warfarin instructions:  9/12: Hold; 9/13: Hold; 9/14: Hold; 9/15: 10 mg; Otherwise 5 mg every Mon, Wed, Fri; 7.5 mg all other days   Next INR check:  9/22/2022             Telephone call with  Home care nurse who verbalizes understanding and agrees to plan    Orders given to  Homecare nurse/facility to recheck    Education provided: Please call back if any changes to your diet, medications or how you've been taking warfarin    Plan made per [facility] anticoagulation protocol    Katelyn Stoner, RN  Anticoagulation Clinic  9/12/2022    _______________________________________________________________________     Anticoagulation Episode Summary     Current INR goal:  2.0-3.0   TTR:  52.4 % (1 y)   Target end date:  Indefinite   Send INR reminders to:  [facility]EDA    Indications    PE (pulmonary thromboembolism) (H) [I26.99]  Paroxysmal A-fib (H) [I48.0]  Deep vein thrombosis (DVT) of distal vein of lower extremity  unspecified chronicity  unspecified laterality (H) [I82.4Z9]  Long term current use of  anticoagulants with INR goal of 2.0-3.0 [Z79.01]           Comments:           Anticoagulation Care Providers     Provider Role Specialty Phone number    Dontrell Gómez MD Referring Internal Medicine [phone]    Anne Heath MD Referring Internal Medicine [phone]

## 2022-09-15 ENCOUNTER — HOSPITAL ENCOUNTER (OUTPATIENT)
Facility: AMBULATORY SURGERY CENTER | Age: 82
Discharge: HOME | End: 2022-09-15
Attending: ANESTHESIOLOGY | Admitting: ANESTHESIOLOGY
Payer: MEDICARE

## 2022-09-15 VITALS
RESPIRATION RATE: 16 BRPM | OXYGEN SATURATION: 99 % | HEART RATE: 70 BPM | WEIGHT: 262 LBS | DIASTOLIC BLOOD PRESSURE: 80 MMHG | BODY MASS INDEX: 37.51 KG/M2 | TEMPERATURE: 96.7 F | HEIGHT: 70 IN | SYSTOLIC BLOOD PRESSURE: 141 MMHG

## 2022-09-15 DIAGNOSIS — G89.29 CHRONIC LEFT SHOULDER PAIN: ICD-10-CM

## 2022-09-15 DIAGNOSIS — M25.512 CHRONIC LEFT SHOULDER PAIN: ICD-10-CM

## 2022-09-15 PROCEDURE — 64418 NJX AA&/STRD SPRSCAP NRV: CPT | Mod: LT | Performed by: ANESTHESIOLOGY

## 2022-09-15 PROCEDURE — 76942 ECHO GUIDE FOR BIOPSY: CPT | Mod: 26 | Performed by: ANESTHESIOLOGY

## 2022-09-15 PROCEDURE — 64418 NJX AA&/STRD SPRSCAP NRV: CPT | Mod: LT

## 2022-09-15 RX ORDER — LIDOCAINE HYDROCHLORIDE 10 MG/ML
INJECTION, SOLUTION EPIDURAL; INFILTRATION; INTRACAUDAL; PERINEURAL PRN
Status: DISCONTINUED | OUTPATIENT
Start: 2022-09-15 | End: 2022-09-15 | Stop reason: HOSPADM

## 2022-09-15 RX ORDER — TRIAMCINOLONE ACETONIDE 40 MG/ML
INJECTION, SUSPENSION INTRA-ARTICULAR; INTRAMUSCULAR PRN
Status: DISCONTINUED | OUTPATIENT
Start: 2022-09-15 | End: 2022-09-15 | Stop reason: HOSPADM

## 2022-09-15 RX ORDER — METOPROLOL SUCCINATE 100 MG/1
100 TABLET, EXTENDED RELEASE ORAL
COMMUNITY
End: 2022-09-17

## 2022-09-15 RX ORDER — CLONIDINE HYDROCHLORIDE 0.3 MG/1
0.3 TABLET ORAL DAILY
Status: ON HOLD | COMMUNITY
End: 2022-09-18

## 2022-09-15 RX ORDER — DIAZEPAM 5 MG
5 TABLET ORAL ONCE
Status: DISCONTINUED | OUTPATIENT
Start: 2022-09-15 | End: 2022-09-16 | Stop reason: HOSPADM

## 2022-09-15 RX ORDER — BUPIVACAINE HYDROCHLORIDE 2.5 MG/ML
INJECTION, SOLUTION EPIDURAL; INFILTRATION; INTRACAUDAL PRN
Status: DISCONTINUED | OUTPATIENT
Start: 2022-09-15 | End: 2022-09-15 | Stop reason: HOSPADM

## 2022-09-15 NOTE — OP NOTE
Patient: Erich Craven Age: 81 year old   MRN: [id number] Attending: Dr. Allen            PAIN MEDICINE CLINIC PROCEDURE NOTE     ATTENDING CLINICIAN:    Swetha Allen MD     ASSISTANT CLINICIAN:    Jami Babin MD      PREPROCEDURE DIAGNOSES:  1.  Left  shoulder pain  2.  Left rotator cuff tear     PROCEDURE(S) PERFORMED:  1.  Left  suprascapular nerve blocks  2.  Ultrasound guidance for the above-named procedure(s)        ANESTHESIA:  Local.     BLOOD LOSS:  Minimal.     DRAINS AND SPECIMENS:  None.     COMPLICATIONS:  None.     INDICATIONS:  Erich Craven is a 82 year old male with a history of  chronic shoulder pain secondary to rotator cuff tear.  The patient stated that the patient was in their usual state of health and denied recent anticoagulant use or recent infections.  Therefore, the plan is ti perform above mentioned procedure.      Procedure Details:     The patient was met in the procedure room, where the patient was identified by name, medical record number and date of birth.  All of the patient s last minute questions were answered. Written informed consent was obtained and saved in the electronic medical record, after the risks, benefits, and alternatives were discussed with the patient.       A formal time-out procedure was performed, as per protocol, including patient name, title of procedure, and site of procedure, and all in the room concurred.  Routine monitors were applied.       The patient was placed in the sitting position on the procedure room table.Routine monitors were placed.  Vital signs were stable.     A chlorhexidine prep was completed followed by sterile draping per standard procedure.  Patient's consent was obtained.  The left  Suprascapular area was prepped using Chloraprep and the area was sterilely draped.  The ultrasound probe was draped and the U/S was used to identify and confirm the depth.  Fascial planes were easily visible as well as subcutaneous tissue and the  supraspinatus fossae with the scapula as the deep border.  A 21G 100 mm  pajunk needle was used in an in-plane fashion and positioned deep to the hyperechoic suprascapular ligament taking care to avoid the visibly pulsatile suprascapular artery.   ~1ml of the steroid local anesthetic was injected to confirm correct placement of the needle tip prior to injection of the remaining 5 mls of 0.25% bupivacaine 4.5 ml and 20 mg( 0.5 ml) of triamcinolone  into the area, with several aspirations being negative for blood.  A screen shot,was taken of nicely extravasating fluid into the the expected location of the supraspinatus nerve.  The needle was removed, and a band-aid was applied.          Light pressure was held at the puncture site(s) to prevent ecchymosis and oozing.  The patient's skin was cleansed, and hemostasis was confirmed.  Band-aids were applied to the needle injection site(s).       Condition:     The patient remained awake and alert throughout the procedure.  The patient tolerated the procedure well and was monitored for approximately 15 minutes afterward in the post procedure area.  There were no immediate post procedure complications noted.  The patient was then discharged to home as per protocol.       Pre-procedure pain score: 8/10  Post-procedure pain score: 0/10

## 2022-09-15 NOTE — DISCHARGE INSTRUCTIONS
PAIN INJECTION HOME CARE INSTRUCTIONS  Activity  -You may resume most normal activity levels with the exception of strenuous activity. It may help to move in ways that hurt before the injection, to see if the pain is still there, but do not overdo it.     -DO NOT remove bandaid for 24 hours  -DO NOT shower for 24 hours      Pain  -You may feel immediate pain relief and numbness for a period of time after the injection. This may indicate the medication has reached the right spot.  -Your pain may return after this short pain-free period, or may even be a little worse for a day or two. It may be caused by needle irritation or by the medication itself. The medications usually take two or three days to start working, but can take as long as a week.    -You may use an ice pack for 20 minutes every 2 hours for the first 24 hours  -You may use a heating pad after the first 24 hours  -You may use Tylenol (acetaminophen) every 4 hours or other pain medicines as directed by your physician    DID YOU RECEIVE STEROIDS TODAY?  {YES / NO:[id number]}    Common side effects of steroids:  Not everyone will experience corticosteroid side effects. If side effects are experienced, they will gradually subside in the 7-10 day period following an injection. Most common side effects include:  -Flushed face and/or chest  -Feeling of warmth, particularly in the face but could be an overall feeling of warmth  -Increased blood sugar in diabetic patients  -Menstrual irregularities my occur. If taking hormone-based birth control an alternate method of birth control is recommended  -Sleep disturbances and/or mood swings are possible  -Leg cramps    PLEASE KEEP TRACK OF YOUR SYMPTOMS AND NOTE ANY CHANGES FOR YOUR DOCTOR.       Please contact us if you have:  -Severe pain  -Fever more than 101.5 degrees Fahrenheit  -Signs of infection at the injection site (redness, swelling, or drainage)      FOR PAIN CENTER PATIENTS of Dr Tillman:  If you have  questions, please contact the Pain Clinic at [phone] Option #1 between the hours of 7:00 am and 3:00 pm Monday through Friday. After office hours you can contact the on call provider by dialing [phone]. If you need immediate attention, we recommend that you go to a hospital emergency room or dial [phone].      FOR PM&R PATIENTS of Dr Lind:  For patients seen by the PM and R service, please call [phone].(Monday through Friday 8a-5p.  After business hours and weekends call [phone] and ask for the PM and R doctor on call). If you need to fax a pain diary to PM&R the fax number is [phone]. If you are unable to fax, uploading to [employer] is encouraged, then send to provider. If you have procedure scheduling questions please call [phone] Option #2

## 2022-09-17 ENCOUNTER — APPOINTMENT (OUTPATIENT)
Dept: GENERAL RADIOLOGY | Facility: CLINIC | Age: 82
End: 2022-09-17
Attending: EMERGENCY MEDICINE
Payer: MEDICARE

## 2022-09-17 ENCOUNTER — HOSPITAL ENCOUNTER (OUTPATIENT)
Facility: CLINIC | Age: 82
Setting detail: OBSERVATION
Discharge: NURSING, REHAB OR LONG-TERM CARE | End: 2022-09-19
Attending: EMERGENCY MEDICINE | Admitting: INTERNAL MEDICINE
Payer: MEDICARE

## 2022-09-17 DIAGNOSIS — S99.911A ANKLE INJURY, RIGHT, INITIAL ENCOUNTER: ICD-10-CM

## 2022-09-17 DIAGNOSIS — M25.512 CHRONIC LEFT SHOULDER PAIN: ICD-10-CM

## 2022-09-17 DIAGNOSIS — G89.29 CHRONIC LEFT SHOULDER PAIN: ICD-10-CM

## 2022-09-17 PROBLEM — Z79.01 LONG TERM CURRENT USE OF ANTICOAGULANTS WITH INR GOAL OF 2.0-3.0: Status: ACTIVE | Noted: 2022-02-04

## 2022-09-17 PROBLEM — N18.30 CHRONIC KIDNEY DISEASE, STAGE 3 (H): Status: ACTIVE | Noted: 2020-12-14

## 2022-09-17 PROBLEM — I82.4Z9 DEEP VEIN THROMBOSIS (DVT) OF DISTAL VEIN OF LOWER EXTREMITY, UNSPECIFIED CHRONICITY, UNSPECIFIED LATERALITY (H): Status: ACTIVE | Noted: 2020-05-08

## 2022-09-17 LAB
ANION GAP SERPL CALCULATED.3IONS-SCNC: 5 MMOL/L (ref 3–14)
BASOPHILS # BLD AUTO: 0 10E3/UL (ref 0–0.2)
BASOPHILS NFR BLD AUTO: 0 %
BUN SERPL-MCNC: 42 MG/DL (ref 7–30)
CALCIUM SERPL-MCNC: 9.6 MG/DL (ref 8.5–10.1)
CHLORIDE BLD-SCNC: 103 MMOL/L (ref 94–109)
CO2 SERPL-SCNC: 30 MMOL/L (ref 20–32)
CREAT SERPL-MCNC: 0.86 MG/DL (ref 0.66–1.25)
EOSINOPHIL # BLD AUTO: 0.1 10E3/UL (ref 0–0.7)
EOSINOPHIL NFR BLD AUTO: 1 %
ERYTHROCYTE [DISTWIDTH] IN BLOOD BY AUTOMATED COUNT: 16.2 % (ref 10–15)
GFR SERPL CREATININE-BSD FRML MDRD: 86 ML/MIN/1.73M2
GLUCOSE BLD-MCNC: 102 MG/DL (ref 70–99)
GLUCOSE BLDC GLUCOMTR-MCNC: 106 MG/DL (ref 70–99)
GLUCOSE BLDC GLUCOMTR-MCNC: 107 MG/DL (ref 70–99)
HCT VFR BLD AUTO: 38 % (ref 40–53)
HGB BLD-MCNC: 12 G/DL (ref 13.3–17.7)
IMM GRANULOCYTES # BLD: 0.1 10E3/UL
IMM GRANULOCYTES NFR BLD: 1 %
INR PPP: 1.38 (ref 0.85–1.15)
LYMPHOCYTES # BLD AUTO: 1.4 10E3/UL (ref 0.8–5.3)
LYMPHOCYTES NFR BLD AUTO: 13 %
MAGNESIUM SERPL-MCNC: 2.2 MG/DL (ref 1.6–2.3)
MCH RBC QN AUTO: 29.9 PG (ref 26.5–33)
MCHC RBC AUTO-ENTMCNC: 31.6 G/DL (ref 31.5–36.5)
MCV RBC AUTO: 95 FL (ref 78–100)
MONOCYTES # BLD AUTO: 0.8 10E3/UL (ref 0–1.3)
MONOCYTES NFR BLD AUTO: 7 %
NEUTROPHILS # BLD AUTO: 8.7 10E3/UL (ref 1.6–8.3)
NEUTROPHILS NFR BLD AUTO: 78 %
NRBC # BLD AUTO: 0 10E3/UL
NRBC BLD AUTO-RTO: 0 /100
PLATELET # BLD AUTO: 205 10E3/UL (ref 150–450)
POTASSIUM BLD-SCNC: 3.9 MMOL/L (ref 3.4–5.3)
RBC # BLD AUTO: 4.02 10E6/UL (ref 4.4–5.9)
SARS-COV-2 RNA RESP QL NAA+PROBE: NEGATIVE
SODIUM SERPL-SCNC: 138 MMOL/L (ref 133–144)
WBC # BLD AUTO: 11.2 10E3/UL (ref 4–11)

## 2022-09-17 PROCEDURE — G0378 HOSPITAL OBSERVATION PER HR: HCPCS

## 2022-09-17 PROCEDURE — 82962 GLUCOSE BLOOD TEST: CPT

## 2022-09-17 PROCEDURE — U0005 INFEC AGEN DETEC AMPLI PROBE: HCPCS | Performed by: EMERGENCY MEDICINE

## 2022-09-17 PROCEDURE — 250N000013 HC RX MED GY IP 250 OP 250 PS 637: Performed by: EMERGENCY MEDICINE

## 2022-09-17 PROCEDURE — 73630 X-RAY EXAM OF FOOT: CPT | Mod: RT

## 2022-09-17 PROCEDURE — 85025 COMPLETE CBC W/AUTO DIFF WBC: CPT | Performed by: EMERGENCY MEDICINE

## 2022-09-17 PROCEDURE — 99285 EMERGENCY DEPT VISIT HI MDM: CPT | Mod: 25

## 2022-09-17 PROCEDURE — 250N000013 HC RX MED GY IP 250 OP 250 PS 637: Performed by: INTERNAL MEDICINE

## 2022-09-17 PROCEDURE — 36415 COLL VENOUS BLD VENIPUNCTURE: CPT | Performed by: EMERGENCY MEDICINE

## 2022-09-17 PROCEDURE — 83735 ASSAY OF MAGNESIUM: CPT | Performed by: INTERNAL MEDICINE

## 2022-09-17 PROCEDURE — 85610 PROTHROMBIN TIME: CPT | Performed by: EMERGENCY MEDICINE

## 2022-09-17 PROCEDURE — 80048 BASIC METABOLIC PNL TOTAL CA: CPT | Performed by: EMERGENCY MEDICINE

## 2022-09-17 PROCEDURE — 73610 X-RAY EXAM OF ANKLE: CPT | Mod: RT

## 2022-09-17 PROCEDURE — 99220 PR INITIAL OBSERVATION CARE,LEVEL III: CPT | Performed by: INTERNAL MEDICINE

## 2022-09-17 PROCEDURE — C9803 HOPD COVID-19 SPEC COLLECT: HCPCS

## 2022-09-17 RX ORDER — NALOXONE HYDROCHLORIDE 0.4 MG/ML
0.4 INJECTION, SOLUTION INTRAMUSCULAR; INTRAVENOUS; SUBCUTANEOUS
Status: DISCONTINUED | OUTPATIENT
Start: 2022-09-17 | End: 2022-09-19 | Stop reason: HOSPADM

## 2022-09-17 RX ORDER — AMOXICILLIN 250 MG
2 CAPSULE ORAL 2 TIMES DAILY PRN
Status: DISCONTINUED | OUTPATIENT
Start: 2022-09-17 | End: 2022-09-19 | Stop reason: HOSPADM

## 2022-09-17 RX ORDER — PROCHLORPERAZINE 25 MG
12.5 SUPPOSITORY, RECTAL RECTAL EVERY 12 HOURS PRN
Status: DISCONTINUED | OUTPATIENT
Start: 2022-09-17 | End: 2022-09-19 | Stop reason: HOSPADM

## 2022-09-17 RX ORDER — ACETAMINOPHEN 500 MG
500 TABLET ORAL ONCE
Status: DISCONTINUED | OUTPATIENT
Start: 2022-09-17 | End: 2022-09-17 | Stop reason: ALTCHOICE

## 2022-09-17 RX ORDER — ONDANSETRON 2 MG/ML
4 INJECTION INTRAMUSCULAR; INTRAVENOUS EVERY 6 HOURS PRN
Status: DISCONTINUED | OUTPATIENT
Start: 2022-09-17 | End: 2022-09-19 | Stop reason: HOSPADM

## 2022-09-17 RX ORDER — PROCHLORPERAZINE MALEATE 5 MG
5 TABLET ORAL EVERY 6 HOURS PRN
Status: DISCONTINUED | OUTPATIENT
Start: 2022-09-17 | End: 2022-09-19 | Stop reason: HOSPADM

## 2022-09-17 RX ORDER — NALOXONE HYDROCHLORIDE 0.4 MG/ML
0.2 INJECTION, SOLUTION INTRAMUSCULAR; INTRAVENOUS; SUBCUTANEOUS
Status: DISCONTINUED | OUTPATIENT
Start: 2022-09-17 | End: 2022-09-19 | Stop reason: HOSPADM

## 2022-09-17 RX ORDER — ALBUTEROL SULFATE 90 UG/1
2 AEROSOL, METERED RESPIRATORY (INHALATION) 4 TIMES DAILY PRN
Status: DISCONTINUED | OUTPATIENT
Start: 2022-09-17 | End: 2022-09-19 | Stop reason: HOSPADM

## 2022-09-17 RX ORDER — FUROSEMIDE 40 MG
40 TABLET ORAL DAILY
Status: DISCONTINUED | OUTPATIENT
Start: 2022-09-18 | End: 2022-09-19 | Stop reason: HOSPADM

## 2022-09-17 RX ORDER — ONDANSETRON 4 MG/1
4 TABLET, ORALLY DISINTEGRATING ORAL EVERY 6 HOURS PRN
Status: DISCONTINUED | OUTPATIENT
Start: 2022-09-17 | End: 2022-09-19 | Stop reason: HOSPADM

## 2022-09-17 RX ORDER — DEXTROSE MONOHYDRATE 25 G/50ML
25-50 INJECTION, SOLUTION INTRAVENOUS
Status: DISCONTINUED | OUTPATIENT
Start: 2022-09-17 | End: 2022-09-19 | Stop reason: HOSPADM

## 2022-09-17 RX ORDER — AMOXICILLIN 250 MG
1 CAPSULE ORAL 2 TIMES DAILY PRN
Status: DISCONTINUED | OUTPATIENT
Start: 2022-09-17 | End: 2022-09-19 | Stop reason: HOSPADM

## 2022-09-17 RX ORDER — ISOSORBIDE MONONITRATE 30 MG/1
30 TABLET, EXTENDED RELEASE ORAL EVERY EVENING
Status: DISCONTINUED | OUTPATIENT
Start: 2022-09-17 | End: 2022-09-19 | Stop reason: HOSPADM

## 2022-09-17 RX ORDER — VENLAFAXINE 37.5 MG/1
37.5 TABLET ORAL 2 TIMES DAILY
Status: DISCONTINUED | OUTPATIENT
Start: 2022-09-17 | End: 2022-09-19 | Stop reason: HOSPADM

## 2022-09-17 RX ORDER — BISACODYL 10 MG
10 SUPPOSITORY, RECTAL RECTAL DAILY PRN
Status: DISCONTINUED | OUTPATIENT
Start: 2022-09-17 | End: 2022-09-19 | Stop reason: HOSPADM

## 2022-09-17 RX ORDER — FUROSEMIDE 20 MG
20 TABLET ORAL
Status: DISCONTINUED | OUTPATIENT
Start: 2022-09-17 | End: 2022-09-19 | Stop reason: HOSPADM

## 2022-09-17 RX ORDER — FUROSEMIDE 40 MG
40 TABLET ORAL
Status: DISCONTINUED | OUTPATIENT
Start: 2022-09-17 | End: 2022-09-17

## 2022-09-17 RX ORDER — ACETAMINOPHEN 325 MG/1
975 TABLET ORAL EVERY 8 HOURS
Status: DISCONTINUED | OUTPATIENT
Start: 2022-09-17 | End: 2022-09-19 | Stop reason: HOSPADM

## 2022-09-17 RX ORDER — LISINOPRIL 20 MG/1
20 TABLET ORAL DAILY
Status: DISCONTINUED | OUTPATIENT
Start: 2022-09-18 | End: 2022-09-19 | Stop reason: HOSPADM

## 2022-09-17 RX ORDER — NICOTINE POLACRILEX 4 MG
15-30 LOZENGE BUCCAL
Status: DISCONTINUED | OUTPATIENT
Start: 2022-09-17 | End: 2022-09-19 | Stop reason: HOSPADM

## 2022-09-17 RX ORDER — SPIRONOLACTONE 25 MG/1
25 TABLET ORAL EVERY OTHER DAY
Status: DISCONTINUED | OUTPATIENT
Start: 2022-09-18 | End: 2022-09-19 | Stop reason: HOSPADM

## 2022-09-17 RX ORDER — ASPIRIN 81 MG/1
81 TABLET, CHEWABLE ORAL DAILY
Status: DISCONTINUED | OUTPATIENT
Start: 2022-09-18 | End: 2022-09-19 | Stop reason: HOSPADM

## 2022-09-17 RX ORDER — DILTIAZEM HYDROCHLORIDE 300 MG/1
300 CAPSULE, COATED, EXTENDED RELEASE ORAL EVERY EVENING
Status: DISCONTINUED | OUTPATIENT
Start: 2022-09-17 | End: 2022-09-19 | Stop reason: HOSPADM

## 2022-09-17 RX ORDER — TAMSULOSIN HYDROCHLORIDE 0.4 MG/1
0.4 CAPSULE ORAL EVERY EVENING
Status: DISCONTINUED | OUTPATIENT
Start: 2022-09-17 | End: 2022-09-19 | Stop reason: HOSPADM

## 2022-09-17 RX ORDER — METOLAZONE 2.5 MG/1
2.5 TABLET ORAL
Status: DISCONTINUED | OUTPATIENT
Start: 2022-09-17 | End: 2022-09-19 | Stop reason: HOSPADM

## 2022-09-17 RX ADMIN — ACETAMINOPHEN 975 MG: 325 TABLET, FILM COATED ORAL at 14:26

## 2022-09-17 RX ADMIN — TRAMADOL HYDROCHLORIDE 25 MG: 50 TABLET ORAL at 20:06

## 2022-09-17 RX ADMIN — VENLAFAXINE 37.5 MG: 37.5 TABLET ORAL at 20:07

## 2022-09-17 RX ADMIN — ISOSORBIDE MONONITRATE 30 MG: 30 TABLET, EXTENDED RELEASE ORAL at 20:06

## 2022-09-17 RX ADMIN — FUROSEMIDE 20 MG: 20 TABLET ORAL at 16:24

## 2022-09-17 RX ADMIN — TRAMADOL HYDROCHLORIDE 25 MG: 50 TABLET ORAL at 14:26

## 2022-09-17 RX ADMIN — METOLAZONE 2.5 MG: 2.5 TABLET ORAL at 17:21

## 2022-09-17 RX ADMIN — TRAMADOL HYDROCHLORIDE 25 MG: 50 TABLET ORAL at 17:43

## 2022-09-17 RX ADMIN — ACETAMINOPHEN 975 MG: 325 TABLET, FILM COATED ORAL at 22:10

## 2022-09-17 RX ADMIN — TAMSULOSIN HYDROCHLORIDE 0.4 MG: 0.4 CAPSULE ORAL at 20:06

## 2022-09-17 RX ADMIN — DILTIAZEM HYDROCHLORIDE 300 MG: 300 CAPSULE, EXTENDED RELEASE ORAL at 20:07

## 2022-09-17 ASSESSMENT — ACTIVITIES OF DAILY LIVING (ADL)
ADLS_ACUITY_SCORE: 18
ADLS_ACUITY_SCORE: 35
ADLS_ACUITY_SCORE: 18
ADLS_ACUITY_SCORE: 35

## 2022-09-17 ASSESSMENT — ENCOUNTER SYMPTOMS
BACK PAIN: 0
ARTHRALGIAS: 1

## 2022-09-17 NOTE — PROGRESS NOTES
RECEIVING UNIT ED HANDOFF REVIEW    ED Nurse Handoff Report was reviewed by: Melissa Westfall RN on September 17, 2022 at 2:35 PM

## 2022-09-17 NOTE — H&P
[facility]    History and Physical  Hospitalist       Date of Admission:  9/17/2022    Assessment & Plan   Erich Craven is a 82 year old male with complex past medical history including DVT and pulmonary emboli on Coumadin, hypertension, chronic diastolic heart failure, obstructive sleep apnea, COPD, hypothyroidism, history of paroxysmal atrial fibrillation, dyslipidemia, coronary disease, lymphedema, type 2 diabetes, CKD stage III, bilateral shoulder issues with recent left shoulder injection who presents with acute onset right ankle pain.  Patient has been in his usual state of health until earlier today he stepped off a curb and turned his right ankle.  He was unable to bear weight and presented to [facility] ER for further evaluation.  Imaging the right ankle and foot shows no fracture.  Suspect significant sprain.  Patient unable to ambulate independently.  Registered observation status.  Ortho consult for further evaluation.  Ortho boot placed.    Active Problems:  Ankle injury, right, initial encounter    Assessment:  -Patient was walking off a curb and seems to have turned his right ankle in.  He was unable to ambulate after that.  -He has focal tenderness at the proximal aspect of the fifth metatarsal.  No pain over the malleolus.  Remainder of exam unremarkable.  He does have some swelling at the site.  X-rays of the ankle and right foot are unremarkable.  -Patient unable to ambulate with a surgical boot and a walker or cane in the emergency room.  -Patient lives alone.  High fall risk.  On multiple cardiac meds and on anticoagulation.      Plan: Registered observation for physical therapy and likely TCU placement.  Ortho consult to determine if patient needs CT of the right foot.  Also to assist with management and follow-up.  Fall precautions.      PE (pulmonary thromboembolism) (H)  History of DVT  Chronic anticoagulation with Coumadin, goal INR 2-3    Assessment: He  is on Coumadin.  Goal INR 2-3.  He had a left shoulder injection last Thursday and held his Coumadin 5 days prior to that and restarted it Thursday night as directed at current dosing.  He is on 5 mg Monday Wednesday Friday and 7.5 mg all other days.    Plan: Daily INR, Coumadin pharmacy consult for dosing.      HTN (hypertension)    Chronic diastolic heart failure (H)    Assessment: He is on multiple antihypertensives.  No medication changes recently.  He has chronic venous stasis.  His edema currently seems stable to the patient.  No exertional shortness of breath or chest pain.  No recent medication changes.  Wt 262#  In June with renal visit. Today 260 pounds    Plan: Continue prior to admission diltiazem beta-blocker lisinopril Lasix and Aldactone.  Low-sodium diet.  Daily I's and O's, daily weight, continue metolazone.  A.m. BMP,    Patient is on not sure if he is taking carvedilol or clonidine now.  He is going to have a family member or friend bring in his pill bottles to clarify.  It seems very unlikely he is taking Coreg as this has not been filled per sure scripts since October 2021.  Will not start Coreg or clonidine at this time.  Ensure meds that discontinued at time of discharge.    Addendum; pill bottle brought in. Reviewed. No coreg or clonidine included in pill bottles. Not appear to be on these meds. Remove from meds at time of discharge.       RIN (obstructive sleep apnea)    Assessment: Patient is on a BiPAP at home.  Is not on oxygen at night or during the day.  Patient is alert and coherent continue BiPAP at home settings.      COPD (chronic obstructive pulmonary disease) (H)    Assessment: Lungs are clear.  Denies any symptoms suggestive of exacerbation.  He is on a controller inhaler.  Continue prior to admission controller inhaler      Hypothyroidism    Assessment: Continue prior to admission Synthroid.      Paroxysmal A-fib (H)    Assessment: Patient on anticoagulation also for history of  DVT and PE.  No bleeding.  Rate controlled.  Took medications this morning.    Plan: Continue prior to admission diltiazem.  It appears as indicated above that he is not like he likely taking Coreg anymore.  Last filled October 2021.  Patient bringing in pill bottles to help clarify      HLD (hyperlipidemia)    Assessment: Continue prior to admission statin      Lymphedema  Left lower extremity recent vein procedure with outside vascular surgeon.    Assessment: Chronic venous stasis changes skin intact, will consult lymphedema.  Follow-up with PCP, follow-up with vascular surgeon outpatient managing his chronic venous disease.  Patient had a recent vein procedure 1 to 2 weeks ago.      Coronary artery disease involving native coronary artery with angina pectoris, unspecified whether native or transplanted heart (H)    Assessment: Has been doing well.  No anginal symptoms.  No cardiopulmonary symptoms.    Plan: Continue prior to admission aspirin, Lipitor,  Imdur, appears patient is no longer on carvedilol.  Patient bringing in pill bottles to clarify.  Last filled October 2021        Diabetes mellitus, type 2 (H)    Assessment: Hemoglobin A1c 5.9 on August 2 of this year.  He is not on medication.    Plan: Low resistance insulin sliding scale, diabetic diet      Chronic kidney disease, stage 3 (H)    Assessment: He is on Lasix and metolazone.  BMP at baseline.    Plan: Continue prior to admission medications.  Daily BMP for now.        DVT Prophylaxis: Patient is on Coumadin.  Daily INRs    Code Status: DNR/DNI.  Discussed with patient    Disposition: Expected discharge in 1 to 2 days out once appropriate disposition has been determined, orthopedic evaluations completed  Patient lives alone.      Omar Hallman MD, MD    Primary Care Physician   Anne Heath    Chief Complaint   Right ankle pain    History is obtained from the patient    History of Present Illness   Erich Craven is a 82 year old male with  complex past medical history including DVT and pulmonary emboli on Coumadin, hypertension, chronic diastolic heart failure, obstructive sleep apnea, COPD, hypothyroidism, history of paroxysmal atrial fibrillation, dyslipidemia, coronary disease, lymphedema, type 2 diabetes, CKD stage III, bilateral shoulder issues with recent left shoulder injection who presents with acute onset right ankle pain.  Patient has been in his usual state of health until earlier today he stepped off a curb and turned his right ankle.  He was unable to bear weight and presented to [facility] ER for further evaluation.    Patient was in his usual state of health until today when he stepped off a curb and turned his right ankle.  Cute onset pain and was unable to ambulate.  Some neighbors helped him into his house and his grandson brought him to [facility] for further evaluation.  Patient did not fall.    Of note patient has chronic lymphedema.  The left worse than right.  Has been working with a the system vascular surgeon regarding his veins.  He had a vein procedure last week.  He has had lymphedema wraps in the past.  He is also had a recent right shoulder replacement surgery in July of this year.  He had a left shoulder injection last week.    His Coumadin was held for 5 days prior to his shoulder injection and restarted last Thursday night the evening of his injection.    Patient denies any fevers chills nausea vomiting, UTI symptoms abdominal pain chest pain shortness of breath bloody urine or stool.  He wears BiPAP at night.  No medication changes.    Patient lives alone.    He is taking tramadol half of 25 mg tablet along with Tylenol about every 6 hours following his shoulder surgery and for his left shoulder.    On exam patient had tenderness over the lateral malleolus to the right fifth metatarsals and heel.      X-rays of the right foot and ankle showed no fracture just swelling.     He was put in a boot.  He was  unable to bear weight and unable to ambulate with a cane or walker.  [place] observation given functionally not able to discharge today.  Patient is afebrile.  Normal pulse.  Blood pressure 151/68.  Normal oxygen saturations.  ER staff attempted to have patient ambulate in the ER with his surgical boot and was unable to take any steps with a walker or cane.    Laboratory studies notable for normal BMP, blood sugar 102.  White blood cell count 11.2.  Hemoglobin stable in the 12 range.  Platelet count 205.  Normal differential    Past Medical History    I have reviewed this patient's medical history and updated it with pertinent information if needed.   Past Medical History:   Diagnosis Date     (HFpEF) heart failure with preserved ejection fraction (H)      Arthritis      BPH with urinary obstruction      CAD (coronary artery disease)     PCI w/ [facility]MER in 2014     Cardiac pacemaker in situ      Chronic diastolic heart failure (H)      Complication of anesthesia      COPD (chronic obstructive pulmonary disease) (H)      Diabetes (H)      DVT (deep venous thrombosis) (H)      Dyspnea      Essential hypertension      Fatigue      HLD (hyperlipidemia)      Hx of long term use of blood thinners     Due to PE. Started in 2012     Hypothyroidism      Lymphedema      Mumps      Obesity, Class III, BMI 40-49.9 (morbid obesity) (H)      RIN (obstructive sleep apnea)     has refused CPAP     Pacemaker      Paroxysmal A-fib (H)      PE (pulmonary thromboembolism) (H)        Past Surgical History   I have reviewed this patient's surgical history and updated it with pertinent information if needed.  Past Surgical History:   Procedure Laterality Date     cardiac stenting       CV RIGHT HEART CATH MEASUREMENTS RECORDED N/A 11/5/2019    Procedure: Right Heart Cath;  Surgeon: Roberto Hernandez MD;  Location:  HEART CARDIAC CATH LAB     ENT SURGERY      tonsillectomy     IMPLANT PACEMAKER       INJECT NERVE BLOCK SUPRASCAPULAR  Bilateral 8/26/2021    Procedure: BLOCK, NERVE, SUPRASCAPULAR, Bilateral, under ultrasound guidance;  Surgeon: Swetha Allen MD;  Location: [facility] OR     INJECT NERVE BLOCK SUPRASCAPULAR Bilateral 9/23/2021    Procedure: bilateral suprascapular nerve block;  Surgeon: Swetha lAlen MD;  Location: [facility] OR     INJECT NERVE BLOCK SUPRASCAPULAR Left 9/15/2022    Procedure: BLOCK, NERVE, SUPRASCAPULAR- left;  Surgeon: Swetha Allen MD;  Location: [facility] OR     NERVE BLOCK PERIPHERAL Bilateral 3/10/2022    Procedure: bilateral suprascapular nerve block;  Surgeon: Swetha Allen MD;  Location: [facility] OR     ORTHOPEDIC SURGERY Right 2012    knee replacement       Prior to Admission Medications   Prior to Admission Medications   Prescriptions Last Dose Informant Patient Reported? Taking?   Acetylcarnitine HCl (ACETYL L-CARNITINE PO)   Yes No   Sig: Take 1 capsule by mouth daily   Fluticasone-Umeclidin-Vilanterol (TRELEGY ELLIPTA) 200-62.5-25 MCG/INH oral inhaler   Yes No   Sig: Inhale 1 puff into the lungs daily   Multiple vitamin TABS   Yes No   Sig: Take 1 tablet by mouth daily   TAURINE PO   Yes No   Sig: Take 1 capsule by mouth daily   acetaminophen (TYLENOL) 500 MG tablet   Yes No   Sig: Take 1,000 mg by mouth 4 times daily as needed   albuterol (PROAIR HFA/PROVENTIL HFA/VENTOLIN HFA) 108 (90 Base) MCG/ACT inhaler   Yes No   Sig: Inhale 2 puffs into the lungs 4 times daily as needed for shortness of breath / dyspnea or wheezing   aspirin (ASA) 81 MG chewable tablet   Yes No   Sig: Take 1 tablet by mouth daily   atorvastatin (LIPITOR) 40 MG tablet   No No   Sig: TAKE 1 TABLET BY MOUTH EVERY EVENING   carvedilol (COREG) 12.5 MG tablet   No No   Sig: TAKE 1 TABLET BY MOUTH TWICE A DAY   cloNIDine (CATAPRES) 0.3 MG tablet   Yes No   Sig: Take 0.3 mg by mouth   co-enzyme Q-10 100 MG CAPS capsule   Yes No   Sig: Take 100 mg by mouth daily   diltiazem ER COATED BEADS (CARDIZEM CD/CARTIA XT) 300 MG 24 hr capsule   No No   Sig:  Take 1 capsule (300 mg) by mouth daily   furosemide (LASIX) 20 MG tablet   No No   Sig: Take 2 tabs in morning and 1 tab in the sfternoon   isosorbide mononitrate (IMDUR) 30 MG 24 hr tablet   No No   Sig: Take 1 tablet (30 mg) by mouth daily   levothyroxine (SYNTHROID/LEVOTHROID) 175 MCG tablet   No No   Sig: Take 1 tablet (175 mcg) by mouth daily   lisinopril (ZESTRIL) 20 MG tablet   No No   Sig: Take 1 tablet (20 mg) by mouth daily   metolazone (ZAROXOLYN) 2.5 MG tablet   No No   Sig: Take 1 tablet (2.5 mg) by mouth 2 times daily   metoprolol succinate ER (TOPROL XL) 100 MG 24 hr tablet   Yes No   Sig: Take 100 mg by mouth   spironolactone (ALDACTONE) 25 MG tablet   No No   Sig: Take 1 tablet (25 mg) by mouth every other day   tamsulosin (FLOMAX) 0.4 MG capsule   No No   Sig: Take 1 capsule (0.4 mg) by mouth daily   traMADol (ULTRAM) 50 MG tablet   No No   Sig: Take 0.5 tablets (25 mg) by mouth 2 times daily as needed for severe pain   venlafaxine (EFFEXOR) 37.5 MG tablet   No No   Sig: Take 1 tablet (37.5 mg) by mouth 2 times daily   warfarin ANTICOAGULANT (COUMADIN) 5 MG tablet   No No   Sig: Take by mouth 5 mg every Sunday & Thursday; 7.5 mg all other days of the week or as directed by the Anticoagulation Clinic      Facility-Administered Medications: None     Allergies   Allergies   Allergen Reactions     No Known Allergies        Social History   I have reviewed this patient's social history and updated it with pertinent information if needed. Roberto Carlos Towbrea  reports that he has quit smoking. He has a 12.50 pack-year smoking history. He has never used smokeless tobacco. He reports that he does not drink alcohol and does not use drugs.    Family History   I have reviewed this patient's family history and updated it with pertinent information if needed.   Family History   Problem Relation Age of Onset     Hypertension Father        Review of Systems   The 10 point Review of Systems is negative other than noted  in the HPI or here.     Physical Exam   Temp: 98  F (36.7  C) Temp src: Temporal BP: (!) 151/68 Pulse: 77   Resp: 18 SpO2: 96 % O2 Device: None (Room air)    Vital Signs with Ranges  Temp:  [98  F (36.7  C)] 98  F (36.7  C)  Pulse:  [77] 77  Resp:  [18] 18  BP: (151)/(68) 151/68  SpO2:  [96 %] 96 %  260 lbs 0 oz    Constitutional: Laying in bed, no acute distress, nontoxic appearing  Eyes: Pupils equal round reactive to light and accommodating, normal sclera, pupils about 2 mm pinpoint.  Extraocular eye motions intact  HEENT: Normocephalic atraumatic, normal oropharynx  Respiratory: Clear to auscultation bilaterally  Cardiovascular: Regular rate and rhythm no rubs gallops or murmurs appreciated, bilateral pedal pulses are present.  No cyanosis.  GI: Soft nontender nondistended  Lymph/Hematologic: No cervical lymphadenopathy  Skin: He has diffuse chronic venous stasis changes bilateral lower extremities chronic edema 1-2+ bilateral lower extremities left worse than right.  No cyanosis.  Skin is intact  Musculoskeletal:   Right foot.  He has focal tenderness over the proximal aspect of the fifth metatarsal.  No pain over the plantar aspect of the foot.  No pain over the digits or the lateral or medial malleolus.  No pain with passive range of motion of the ankle on the right.  No focal joint swelling or erythema.  No pain with internal or external rotation of the hips bilaterally.  No pain with passive range of motion of bilateral knees and ankles.  Neurologic: Cranial nerves II through XII grossly intact.  Is alert and oriented.  Strength out of 5 in extremities x4.  Normal dorsal and plantar flexion.  Psychiatric: Normal affect    Data   Data reviewed today:  I personally reviewed laboratory studies are pending.  Reviewed x-rays performed the emergency room  Recent Labs   Lab 09/12/22  1140   INR 1.7*       Imaging:  Recent Results (from the past 24 hour(s))   Foot  XR, G/E 3 views, right    Narrative    EXAM: XR  FOOT RIGHT G/E 3 VIEWS  LOCATION: [facility]  DATE/TIME: 9/17/2022 1:03 PM    INDICATION: right foot and ankle injury  COMPARISON: None.      Impression    IMPRESSION: Dorsal soft tissue swelling. No fractures are identified. Atheromatous calcification. Plantar calcaneal spur.   Ankle XR, G/E 3 views, right    Narrative    EXAM: XR ANKLE RIGHT G/E 3 VIEWS  LOCATION: [facility]  DATE/TIME: 9/17/2022 1:03 PM    INDICATION: right ankle and foot injury  COMPARISON: None.      Impression    IMPRESSION: Diffuse soft tissue swelling. No fractures are identified. The ankle mortise is intact. The talar dome is unremarkable. Atheromatous calcification. Plantar calcaneal spur.

## 2022-09-17 NOTE — PLAN OF CARE
Goal Outcome Evaluation:  Orientation/Cognitive: Ax) x4  Observation Goals (Met/ Not Met): Not Met  Mobility Level/Assist Equipment: Non weightbearing on right side, Ax2 GB/W and Ortho Boot  Fall Risk (Y/N): N  Behavior Concerns: None  Pain Management: PRN Tramadol x1   Tele/VS/O2: VSS on RA   ABNL Lab/BG: WBC 11.2, Hemoglobin 12.0  Diet: Mod Carb  Bowel/Bladder: Urinal at the bedside  Skin Concerns: Guy legs at baseline. Edema reported as baseline.   Drains/Devices: PIV SL  Tests/Procedures for next shift: PT, SW/CM, Ortho, Lymphedema COnsults  Anticipated DC date & active delays: TBD  Patient Stated Goal for Today: None     Observation goals  PRIOR TO DISCHARGE        Comments: -diagnostic tests and consults completed and resulted Not Met  -vital signs normal or at patient baseline Met  -tolerating oral intake to maintain hydration Met  -adequate pain control on oral analgesics Not Met  -returns to baseline functional status Not Met  -safe disposition plan has been identified Not Met  Nurse to notify provider when observation goals have been met and patient is ready for discharge.

## 2022-09-17 NOTE — ED PROVIDER NOTES
History   Chief Complaint:  Ankle pain     HPI   Erich Craven is a 82 year old male on Warfarin with history of hypertension, hyperlipidemia, and type II diabetes mellitus who presents with lateral right ankle pain after stepping curb about 2.5 hours ago and hearing a crack. He did not hit his head. He is not able to bear weight on the leg, and he has been getting around with a wheel chair. He denies any pain in his knee or back pain.     Review of Systems   Musculoskeletal: Positive for arthralgias and gait problem. Negative for back pain.   All other systems reviewed and are negative.      Allergies:  No Known Allergies    Medications:  Acetylcarnitine   Albuterol   Asprin 81 mg   Atorvastatin   Carvedilol   Clonidine   Diltiazem   Fluticasone-Umeclidin-Vilanterol   Furosemide   Imdur   Levothyroxine   Lisinopril   Ondansetron   Metolazone   Metoprolol succinate   Spironolactone   Tamsulosin   Taurine   Tramadol   Venlafaxine   Warfarin     Past Medical History:     Morbid obesity   Pulmonary thromboembolism  Sick sinus syndrome  Acute posthemorrhagic anemia  Angina pectoris  Diverticulitis   Hypertension   Chronic diastolic heart failure   Atherosclerotic Heart Disease   Dyspnea   Pontine lesion  Impingement syndrome of right shoulder  RIN   COPD  Paroxsymal atrial fibrillation  Hyperlipidemia   Lymphedema   DVT  CAD  Type II diabetes mellitus   Chronic kidney disease stage 3  Chronic right shoulder pain  Benign neoplasm of cerebral meninges     Past Surgical History:    Cardiac stenting   CV right heart catheterization   Tonsillectomy  Implant pacemaker  Knee replacement   Adenoidectomy     Family History:    Father: hypertension    Social History:  The patient presents to the ED by himself.   PCP: Anne Heath     Physical Exam     Patient Vitals for the past 24 hrs:   BP Temp Temp src Pulse Resp SpO2 Weight   09/17/22 1216 (!) 151/68 98  F (36.7  C) Temporal 77 18 96 % 117.9 kg (260 lb)        Physical Exam  Eyes:  The pupils are equal and round    Conjunctivae and sclerae are normal  ENT:    The nose is normal    Pinnae are normal  CV:  Regular rate and rhythm     Symmetric edema bilaterally    Doppler DP pulse on right. Normal capillary refill on right  Resp:  Lungs are clear    Non-labored    No rales    No wheezing    MS:  Normal muscular tone    No asymmetric leg swelling    Tenderness to the right lateral malleolus through the proximal 5th metatrasal  Skin:  No rash or acute skin lesions noted  Neuro:   Awake, alert.      Speech is normal and fluent.    Face is symmetric.     Moves all extremities      Emergency Department Course       Imaging:  Ankle XR, G/E 3 views, right   Final Result   IMPRESSION: Diffuse soft tissue swelling. No fractures are identified. The ankle mortise is intact. The talar dome is unremarkable. Atheromatous calcification. Plantar calcaneal spur.      Foot  XR, G/E 3 views, right   Final Result   IMPRESSION: Dorsal soft tissue swelling. No fractures are identified. Atheromatous calcification. Plantar calcaneal spur.        Report per radiology    Emergency Department Course:  Reviewed:  I reviewed nursing notes, vitals, past medical history and Care Everywhere    Assessments:  1233 I obtained history and examined the patient as noted above.     Consults:  1352 I consulted the hospitalist Dr. Hallman regarding admission.     Interventions:  Medications   traMADol (ULTRAM) half-tab 25 mg (has no administration in time range)   acetaminophen (TYLENOL) tablet 500 mg (has no administration in time range)     Disposition:  The patient was admitted to the hospital under the care of Dr. Hallman.     Impression & Plan     Medical Decision Making:  Erich Craven is a 82 year old male who presents to the emergency department with right ankle and foot pain.  He stepped off of a curb awkwardly and injured his right foot and ankle.  He said not been able to bear weight on the  area.  On exam he has tenderness to the lateral malleolus and proximal right fifth metatarsal.  He is able to have normal sensation in his toes.  Dopplered DP pulse on his right side and he has normal capillary refill.  He appears to have chronic edema bilaterally.  Denies other injuries and there is no tenderness of his proximal fibula.  He did not fall to the ground.  He is on warfarin.  When attempting to ambulate after imaging with a walking boot in place, he is unable to bear any weight with a cane or with a walker.  He has issues with his shoulders and his mobility is now quite limited with this ankle and foot injury.  He is willing to stay in observation for PT and OT and further evaluation.  He was given tramadol and Tylenol for his pain.      Diagnosis:    ICD-10-CM    1. Ankle injury, right, initial encounter  S99.911A        Discharge Medications:  New Prescriptions    No medications on file       Scribe Disclosure:  KAM, Joshua Kjer, am serving as a scribe at 12:33 PM on 9/17/2022 to document services personally performed by Josafat Palma MD based on my observations and the provider's statements to me.            Josafat Palma MD  09/17/22 3161

## 2022-09-17 NOTE — PHARMACY-ANTICOAGULATION SERVICE
Clinical Pharmacy - Warfarin Dosing Consult     Pharmacy has been consulted to manage this patient s warfarin therapy.  Indication: Atrial Fibrillation;DVT/ PE Treatment  Therapy Goal: INR 2-3  Warfarin Prior to Admission: Yes  Warfarin PTA Regimen: 5 mg MWF, 7.5 mg all other days  Recent documented change in oral intake/nutrition: Unknown  Dose Comments: No dose today - patient took dose this morning    INR   Date Value Ref Range Status   09/17/2022 1.38 (H) 0.85 - 1.15 Final     INR (External)   Date Value Ref Range Status   09/12/2022 1.7 (A) 0.9 - 1.1 Final     Recommend no warfarin dose today as patient already took this morning.  Pharmacy will monitor Erich Craven daily and order warfarin doses to achieve specified goal.      Please contact pharmacy as soon as possible if the warfarin needs to be held for a procedure or if the warfarin goals change.      Nimisha Harkins, PharmD, BCPS

## 2022-09-18 ENCOUNTER — APPOINTMENT (OUTPATIENT)
Dept: PHYSICAL THERAPY | Facility: CLINIC | Age: 82
End: 2022-09-18
Attending: INTERNAL MEDICINE
Payer: MEDICARE

## 2022-09-18 LAB
ANION GAP SERPL CALCULATED.3IONS-SCNC: 6 MMOL/L (ref 3–14)
BUN SERPL-MCNC: 29 MG/DL (ref 7–30)
CALCIUM SERPL-MCNC: 9.4 MG/DL (ref 8.5–10.1)
CHLORIDE BLD-SCNC: 106 MMOL/L (ref 94–109)
CO2 SERPL-SCNC: 27 MMOL/L (ref 20–32)
CREAT SERPL-MCNC: 0.74 MG/DL (ref 0.66–1.25)
ERYTHROCYTE [DISTWIDTH] IN BLOOD BY AUTOMATED COUNT: 16.2 % (ref 10–15)
GFR SERPL CREATININE-BSD FRML MDRD: 90 ML/MIN/1.73M2
GLUCOSE BLD-MCNC: 119 MG/DL (ref 70–99)
GLUCOSE BLDC GLUCOMTR-MCNC: 105 MG/DL (ref 70–99)
GLUCOSE BLDC GLUCOMTR-MCNC: 107 MG/DL (ref 70–99)
GLUCOSE BLDC GLUCOMTR-MCNC: 111 MG/DL (ref 70–99)
GLUCOSE BLDC GLUCOMTR-MCNC: 120 MG/DL (ref 70–99)
GLUCOSE BLDC GLUCOMTR-MCNC: 123 MG/DL (ref 70–99)
HCT VFR BLD AUTO: 34.8 % (ref 40–53)
HGB BLD-MCNC: 11 G/DL (ref 13.3–17.7)
INR PPP: 1.28 (ref 0.85–1.15)
MCH RBC QN AUTO: 30.2 PG (ref 26.5–33)
MCHC RBC AUTO-ENTMCNC: 31.6 G/DL (ref 31.5–36.5)
MCV RBC AUTO: 96 FL (ref 78–100)
PLATELET # BLD AUTO: 205 10E3/UL (ref 150–450)
POTASSIUM BLD-SCNC: 4.1 MMOL/L (ref 3.4–5.3)
RBC # BLD AUTO: 3.64 10E6/UL (ref 4.4–5.9)
SODIUM SERPL-SCNC: 139 MMOL/L (ref 133–144)
WBC # BLD AUTO: 12.3 10E3/UL (ref 4–11)

## 2022-09-18 PROCEDURE — 97161 PT EVAL LOW COMPLEX 20 MIN: CPT | Mod: GP

## 2022-09-18 PROCEDURE — 97530 THERAPEUTIC ACTIVITIES: CPT | Mod: GP

## 2022-09-18 PROCEDURE — G0378 HOSPITAL OBSERVATION PER HR: HCPCS

## 2022-09-18 PROCEDURE — 99226 PR SUBSEQUENT OBSERVATION CARE,LEVEL III: CPT | Performed by: PHYSICIAN ASSISTANT

## 2022-09-18 PROCEDURE — 80048 BASIC METABOLIC PNL TOTAL CA: CPT | Performed by: INTERNAL MEDICINE

## 2022-09-18 PROCEDURE — 82962 GLUCOSE BLOOD TEST: CPT | Mod: 91

## 2022-09-18 PROCEDURE — 250N000013 HC RX MED GY IP 250 OP 250 PS 637: Performed by: PHYSICIAN ASSISTANT

## 2022-09-18 PROCEDURE — 36415 COLL VENOUS BLD VENIPUNCTURE: CPT | Performed by: INTERNAL MEDICINE

## 2022-09-18 PROCEDURE — 85610 PROTHROMBIN TIME: CPT | Performed by: INTERNAL MEDICINE

## 2022-09-18 PROCEDURE — 99207 PR MOONLIGHTING INDICATOR: CPT | Performed by: PHYSICIAN ASSISTANT

## 2022-09-18 PROCEDURE — 250N000013 HC RX MED GY IP 250 OP 250 PS 637: Performed by: INTERNAL MEDICINE

## 2022-09-18 PROCEDURE — 85027 COMPLETE CBC AUTOMATED: CPT | Performed by: INTERNAL MEDICINE

## 2022-09-18 PROCEDURE — 250N000013 HC RX MED GY IP 250 OP 250 PS 637: Performed by: HOSPITALIST

## 2022-09-18 RX ORDER — WARFARIN SODIUM 7.5 MG/1
7.5 TABLET ORAL
Status: COMPLETED | OUTPATIENT
Start: 2022-09-18 | End: 2022-09-18

## 2022-09-18 RX ADMIN — DILTIAZEM HYDROCHLORIDE 300 MG: 300 CAPSULE, EXTENDED RELEASE ORAL at 20:40

## 2022-09-18 RX ADMIN — WARFARIN SODIUM 7.5 MG: 7.5 TABLET ORAL at 17:58

## 2022-09-18 RX ADMIN — TRAMADOL HYDROCHLORIDE 25 MG: 50 TABLET ORAL at 10:15

## 2022-09-18 RX ADMIN — ACETAMINOPHEN 975 MG: 325 TABLET, FILM COATED ORAL at 14:31

## 2022-09-18 RX ADMIN — ASPIRIN 81 MG CHEWABLE TABLET 81 MG: 81 TABLET CHEWABLE at 07:57

## 2022-09-18 RX ADMIN — SPIRONOLACTONE 25 MG: 25 TABLET ORAL at 07:57

## 2022-09-18 RX ADMIN — VENLAFAXINE 37.5 MG: 37.5 TABLET ORAL at 20:40

## 2022-09-18 RX ADMIN — METOLAZONE 2.5 MG: 2.5 TABLET ORAL at 07:58

## 2022-09-18 RX ADMIN — Medication 1 MG: at 20:40

## 2022-09-18 RX ADMIN — VENLAFAXINE 37.5 MG: 37.5 TABLET ORAL at 07:59

## 2022-09-18 RX ADMIN — FUROSEMIDE 20 MG: 20 TABLET ORAL at 16:50

## 2022-09-18 RX ADMIN — TAMSULOSIN HYDROCHLORIDE 0.4 MG: 0.4 CAPSULE ORAL at 20:40

## 2022-09-18 RX ADMIN — ISOSORBIDE MONONITRATE 30 MG: 30 TABLET, EXTENDED RELEASE ORAL at 20:40

## 2022-09-18 RX ADMIN — LEVOTHYROXINE SODIUM 175 MCG: 100 TABLET ORAL at 07:57

## 2022-09-18 RX ADMIN — FUROSEMIDE 40 MG: 40 TABLET ORAL at 07:58

## 2022-09-18 RX ADMIN — ACETAMINOPHEN 975 MG: 325 TABLET, FILM COATED ORAL at 22:58

## 2022-09-18 RX ADMIN — TRAMADOL HYDROCHLORIDE 25 MG: 50 TABLET ORAL at 02:56

## 2022-09-18 RX ADMIN — METOLAZONE 2.5 MG: 2.5 TABLET ORAL at 16:51

## 2022-09-18 RX ADMIN — ACETAMINOPHEN 975 MG: 325 TABLET, FILM COATED ORAL at 06:32

## 2022-09-18 RX ADMIN — LISINOPRIL 20 MG: 20 TABLET ORAL at 07:57

## 2022-09-18 RX ADMIN — TRAMADOL HYDROCHLORIDE 25 MG: 50 TABLET ORAL at 16:55

## 2022-09-18 ASSESSMENT — ACTIVITIES OF DAILY LIVING (ADL)
ADLS_ACUITY_SCORE: 18
ADLS_ACUITY_SCORE: 20
ADLS_ACUITY_SCORE: 18

## 2022-09-18 NOTE — UTILIZATION REVIEW
Concurrent stay review; Secondary Review Determination     Under the authority of the Utilization Management Committee, the utilization review process indicated a secondary review on the above patient.  The review outcome is based on review of the medical records, discussions with staff, and applying clinical experience noted on the date of the review.          (x) Observation Status Appropriate - Concurrent stay review    RATIONALE FOR DETERMINATION   83 yo man with history of DVT and pulmonary emboli on Coumadin, hypertension, chronic diastolic heart failure, obstructive sleep apnea, COPD, hypothyroidism, history of paroxysmal atrial fibrillation, dyslipidemia, coronary disease, lymphedema, type 2 diabetes, CKD stage III who had a right ankle injury after a misplaced step off curb. No evidence for fracture. PT evaluation recommends TCU placement vs HHC with therapy. Pain controlled on oral analgesics only. No surgery needed.     Patient is clinically improving and there is no clear indication to change patient's status to inpatient. The severity of illness, intensity of service provided, expected LOS and risk for adverse outcome make the care appropriate for observation.    This document was produced using voice recognition software     The information on this document is developed by the utilization review team in order for the business office to ensure compliance.  This only denotes the appropriateness of proper admission status and does not reflect the quality of care rendered.         The definitions of Inpatient Status and Observation Status used in making the determination above are those provided in the CMS Coverage Manual, Chapter 1 and Chapter 6, section 70.4.      Sincerely,   Elvia Murray MD  Utilization Review  Physician Advisor  [facility].

## 2022-09-18 NOTE — PROGRESS NOTES
Observation goals  PRIOR TO DISCHARGE        Comments: -diagnostic tests and consults completed and resulted Not Met  -vital signs normal or at patient baseline Met  -tolerating oral intake to maintain hydration Met  -adequate pain control on oral analgesics Not Met  -returns to baseline functional status Not Met  -safe disposition plan has been identified Not Met  Nurse to notify provider when observation goals have been met and patient is ready for discharge.

## 2022-09-18 NOTE — PROVIDER NOTIFICATION
MD Notification    Notified Person: MD    Notified Person Name: Amanda England    Notification Date/Time: 9/18/2022 0805    Notification Interaction: AMcom    Purpose of Notification: Good morning. Pt requesting additional PRN pain medication options. Pt currently only has a 2 time PRN order for tramadol.       Orders Received:    Comments:

## 2022-09-18 NOTE — PLAN OF CARE
Summary:    Care Plan Summary Note:  Orientation: A&OX4  Observation Goals (met & not met): Not Met  Activity Level: Not Evaluated   Fall Risk: Y  Behavior & Aggression Tool Color: Green  Pain Management: Prn Tramadol @0256  ABNL VS/O2: VSS/RA  ABNL Lab/BG: urea-42  Diet: Mod Carb  Bowel/Bladder: Damian  Drains/Devices: PIV SL  Tests/Procedures for next shift: SW,PT  Anticipated DC date: TBD          Observation goals  PRIOR TO DISCHARGE        Comments: -diagnostic tests and consults completed and resulted Not Met  -vital signs normal or at patient baseline Met  -tolerating oral intake to maintain hydration Met  -adequate pain control on oral analgesics Not Met  -returns to baseline functional status Not Met  -safe disposition plan has been identified Not Met  Nurse to notify provider when observation goals have been met and patient is ready for discharge.

## 2022-09-18 NOTE — PLAN OF CARE
Lymphedema: Orders received, chart reviewed. Pt is admitted under observation with expected short expected length of stay, also had vascular procedure done in the last two weeks. Pt not appropriate to initiate lymphedema treatment in inpatient setting, will defer to TCU or outpatient depending on discharge plan from hospital.

## 2022-09-18 NOTE — PLAN OF CARE
Physical Therapy Discharge Summary    Reason for therapy discharge:    No further expectations of functional progress.    Progress towards therapy goal(s). See goals on Care Plan in [facility] electronic health record for goal details.  Goals partially met.  Barriers to achieving goals:   limited tolerance for therapy.    Therapy recommendation(s):    Continued therapy is recommended.  Rationale/Recommendations:  Pt would benefit from continued skilled PT to progress strength and functional independence as ankle sprain heals.

## 2022-09-18 NOTE — PLAN OF CARE
Goal Outcome Evaluation:    Orientation/Cognitive: AxO x4  Observation Goals (Met/ Not Met): Not Met  Mobility Level/Assist Equipment: Ax2 GB/W   Fall Risk (Y/N): Y  Behavior Concerns: None  Pain Management: PRN Tramadol. Scheduled Tylenol  Tele/VS/O2: VSS on RA   ABNL Lab/BG:   Diet: Mod Carb   Bowel/Bladder: No concerns  Skin Concerns: Previous brusing and scabs  Drains/Devices: PIV SL   Tests/Procedures for next shift: Orthopedic Consult  Anticipated DC date & active delays: TBD pending TCU placement    Patient Stated Goal for Today: to get comfortable     Observation goals  PRIOR TO DISCHARGE        Comments: -diagnostic tests and consults completed and resulted Not Met  -vital signs normal or at patient baseline Met  -tolerating oral intake to maintain hydration Met  -adequate pain control on oral analgesics Partially Met  -returns to baseline functional status Not Met  -safe disposition plan has been identified Not Met  Nurse to notify provider when observation goals have been met and patient is ready for discharge.

## 2022-09-18 NOTE — PROGRESS NOTES
Observation goals  PRIOR TO DISCHARGE        Comments: -diagnostic tests and consults completed and resulted Not Met  -vital signs normal or at patient baseline Met  -tolerating oral intake to maintain hydration Met  -adequate pain control on oral analgesics Partially Met  -returns to baseline functional status Not Met  -safe disposition plan has been identified Not Met  Nurse to notify provider when observation goals have been met and patient is ready for discharge.

## 2022-09-18 NOTE — PROGRESS NOTES
[facility]    Medicine Progress Note - Hospitalist Service    Date of Admission:  9/17/2022    Assessment & Plan            Erich Craven is a 82 year old male with complex past medical history including DVT and pulmonary emboli on Coumadin, hypertension, chronic diastolic heart failure, obstructive sleep apnea, COPD, hypothyroidism, history of paroxysmal atrial fibrillation, dyslipidemia, coronary disease, lymphedema, type 2 diabetes, CKD stage III, bilateral shoulder issues with recent left shoulder injection who presents with acute onset right ankle pain.  Patient has been in his usual state of health until earlier today he stepped off a curb and turned his right ankle.  He was unable to bear weight and presented to [facility] ER for further evaluation.  Imaging the right ankle and foot shows no fracture.  Suspect significant sprain.  Patient unable to ambulate independently.  Registered observation status.  Ortho consult for further evaluation.  Ortho boot placed.     Active Problems:  Ankle injury, right, initial encounter, walked off a curb, x-ray of ankle and foot unremarkable for fracture.  -Orthopedic consult - defer if CT of right foot is needed  -Ortho boot in place  -PT evaluation recommended TCU  -Tramadol as needed for pain       PE (pulmonary thromboembolism) (H)  History of DVT  Chronic anticoagulation with Coumadin, goal INR 2-3   He had a left shoulder injection last Thursday and held his Coumadin 5 days prior to that and restarted it Thursday night as directed at current dosing.  He is on 5 mg Monday Wednesday Friday and 7.5 mg all other days.   -Daily INR,   -Coumadin pharmacy consult for dosing.       HTN (hypertension)    Chronic diastolic heart failure (H)  Weight remained stable, no in acute exacerbation.  -Continue prior to admission diltiazem lisinopril and Imdur  Not currently on Coreg or Clonidine per PTA medication req  -Low-sodium diet.   -Daily I's  and O's, daily weight,  -Continue metolazone, Lasix and aldactone. Creatinine stable at 0.74         RIN (obstructive sleep apnea)   -Continue BIPAP per home settings       COPD (chronic obstructive pulmonary disease) (H)  -No in an acute exacerbation  -Continue PTA medications       Hypothyroidism    Assessment: Continue prior to admission Synthroid.       Paroxysmal A-fib (H) with know history of PE and DVT, rate controlled  -Continue coumadin goal INR 2-3 with pharmacy dosing  -Continue diltiazem       HLD (hyperlipidemia)  -resume PTA Lipitor at discharge      Lymphedema  Left lower extremity recent vein procedure with outside vascular surgeon.  -Lymphedema consult placed on admission  -Recommend OP follow up with PCP and Vascular surgery.        Coronary artery disease involving native coronary artery with angina pectoris, unspecified whether native or transplanted heart (H), asymptomatic   -Continue PTA aspirin, Lipitor,  Imdur,         Diabetes mellitus, type 2 (H): Hemoglobin A1c 5.9 on August 2 of this year.  He is not on medication.  -Low resistance insulin sliding scale  -diabetic diet       Chronic kidney disease, stage 3 (H)   Assessment: He is on Lasix, aldactone and metolazone.  .  -Continue PTA medications  -BMP prn          Diet: Combination Diet Moderate Consistent Carb (60 g CHO per Meal) Diet; Low Saturated Fat Na <2400mg Diet    DVT Prophylaxis: Warfarin  Reddy Catheter: Not present  Central Lines: None  Cardiac Monitoring: None  Code Status: No CPR- Do NOT Intubate      Disposition Plan      Expected Discharge Date: 09/18/2022        Discharge Comments: Ortho consult.  PT consult.  placement needs?        The patient's care was discussed with the Attending Physician, Dr. Middleton, RN, , and .    Amanda England PA-C  Hospitalist Service  [facility]  Securely message with the Vocera Web Console (learn more here)  Text page via [employer] Paging/Directory  "        Clinically Significant Risk Factors Present on Admission                     # Coagulation Defect: home medication list includes an anticoagulant medication   # Hypertension: home medication list includes antihypertensive(s)    # Obesity: Estimated body mass index is 37.31 kg/m  as calculated from the following:    Height as of 9/15/22: 1.778 m (5' 10\").    Weight as of this encounter: 117.9 kg (260 lb).        ______________________________________________________________________    Interval History   Pain controlled with tramadol. Ankle remains the same.  Agreeable to TCU. No CP, SOB, dizziness, numbness or tinging.    Data reviewed today: I reviewed all medications, new labs and imaging results over the last 24 hours. I personally reviewed no images or EKG's today.    Physical Exam   Vital Signs: Temp: 98.2  F (36.8  C) Temp src: Oral BP: 134/68 Pulse: 60   Resp: 18 SpO2: 93 % O2 Device: None (Room air)    Weight: 260 lbs 0 oz  General Appearance: A&O x3 NAD, resting in bed  Respiratory: CTA no accessory muscle use  Cardiovascular: irr s1 s2, trace LE edema  GI: soft, NT, ND, BS present, obese  Skin: warm and dry, chronic venous stasis changes bilateral lower extremities  Other: Normal mood and affect, answers questions appropriately, bilateral lower extremities neurovascularly intact, right ankle with swelling but no erythema     Data   Recent Labs   Lab 09/18/22  0751 09/18/22  0611 09/18/22  0200 09/17/22  1627 09/17/22  1350 09/12/22  1140   WBC  --  12.3*  --   --  11.2*  --    HGB  --  11.0*  --   --  12.0*  --    MCV  --  96  --   --  95  --    PLT  --  205  --   --  205  --    INR  --  1.28*  --   --  1.38* 1.7*   NA  --  139  --   --  138  --    POTASSIUM  --  4.1  --   --  3.9  --    CHLORIDE  --  106  --   --  103  --    CO2  --  27  --   --  30  --    BUN  --  29  --   --  42*  --    CR  --  0.74  --   --  0.86  --    ANIONGAP  --  6  --   --  5  --    LAWSON  --  9.4  --   --  9.6  --    GLC " 105* 119* 111*   < > 102*  --     < > = values in this interval not displayed.     Recent Results (from the past 24 hour(s))   Foot  XR, G/E 3 views, right    Narrative    EXAM: XR FOOT RIGHT G/E 3 VIEWS  LOCATION: [facility]  DATE/TIME: 9/17/2022 1:03 PM    INDICATION: right foot and ankle injury  COMPARISON: None.      Impression    IMPRESSION: Dorsal soft tissue swelling. No fractures are identified. Atheromatous calcification. Plantar calcaneal spur.   Ankle XR, G/E 3 views, right    Narrative    EXAM: XR ANKLE RIGHT G/E 3 VIEWS  LOCATION: [facility]  DATE/TIME: 9/17/2022 1:03 PM    INDICATION: right ankle and foot injury  COMPARISON: None.      Impression    IMPRESSION: Diffuse soft tissue swelling. No fractures are identified. The ankle mortise is intact. The talar dome is unremarkable. Atheromatous calcification. Plantar calcaneal spur.     Medications       acetaminophen  975 mg Oral Q8H     aspirin  81 mg Oral Daily     diltiazem ER COATED BEADS  300 mg Oral QPM     Fluticasone-Umeclidin-Vilanterol  1 puff Inhalation Daily     furosemide  20 mg Oral Daily at 4 pm     furosemide  40 mg Oral Daily     insulin aspart  1-3 Units Subcutaneous TID AC     insulin aspart  1-3 Units Subcutaneous At Bedtime     isosorbide mononitrate  30 mg Oral QPM     levothyroxine  175 mcg Oral Daily     lisinopril  20 mg Oral Daily     metolazone  2.5 mg Oral BID     spironolactone  25 mg Oral Every Other Day     tamsulosin  0.4 mg Oral QPM     venlafaxine  37.5 mg Oral BID     warfarin ANTICOAGULANT  7.5 mg Oral ONCE at 18:00     Warfarin Therapy Reminder  1 each Oral See Admin Instructions

## 2022-09-19 ENCOUNTER — APPOINTMENT (OUTPATIENT)
Dept: CT IMAGING | Facility: CLINIC | Age: 82
End: 2022-09-19
Attending: PHYSICIAN ASSISTANT
Payer: MEDICARE

## 2022-09-19 ENCOUNTER — LAB REQUISITION (OUTPATIENT)
Dept: LAB | Facility: CLINIC | Age: 82
End: 2022-09-19

## 2022-09-19 ENCOUNTER — DOCUMENTATION ONLY (OUTPATIENT)
Dept: ANTICOAGULATION | Facility: CLINIC | Age: 82
End: 2022-09-19

## 2022-09-19 VITALS
WEIGHT: 249.78 LBS | TEMPERATURE: 97.9 F | OXYGEN SATURATION: 93 % | RESPIRATION RATE: 16 BRPM | DIASTOLIC BLOOD PRESSURE: 80 MMHG | BODY MASS INDEX: 35.84 KG/M2 | SYSTOLIC BLOOD PRESSURE: 144 MMHG | HEART RATE: 62 BPM

## 2022-09-19 DIAGNOSIS — Z79.01 LONG TERM CURRENT USE OF ANTICOAGULANTS WITH INR GOAL OF 2.0-3.0: ICD-10-CM

## 2022-09-19 DIAGNOSIS — I48.0 PAROXYSMAL A-FIB (H): ICD-10-CM

## 2022-09-19 DIAGNOSIS — I82.4Z9 DEEP VEIN THROMBOSIS (DVT) OF DISTAL VEIN OF LOWER EXTREMITY, UNSPECIFIED CHRONICITY, UNSPECIFIED LATERALITY (H): ICD-10-CM

## 2022-09-19 DIAGNOSIS — I26.99 PE (PULMONARY THROMBOEMBOLISM) (H): Primary | ICD-10-CM

## 2022-09-19 DIAGNOSIS — Z00.01 ENCOUNTER FOR GENERAL ADULT MEDICAL EXAMINATION WITH ABNORMAL FINDINGS: ICD-10-CM

## 2022-09-19 LAB
GLUCOSE BLDC GLUCOMTR-MCNC: 106 MG/DL (ref 70–99)
GLUCOSE BLDC GLUCOMTR-MCNC: 113 MG/DL (ref 70–99)
INR PPP: 1.18 (ref 0.85–1.15)

## 2022-09-19 PROCEDURE — 99225 PR SUBSEQUENT OBSERVATION CARE,LEVEL II: CPT | Performed by: PHYSICIAN ASSISTANT

## 2022-09-19 PROCEDURE — 85610 PROTHROMBIN TIME: CPT | Performed by: INTERNAL MEDICINE

## 2022-09-19 PROCEDURE — 250N000013 HC RX MED GY IP 250 OP 250 PS 637: Performed by: INTERNAL MEDICINE

## 2022-09-19 PROCEDURE — G1010 CDSM STANSON: HCPCS

## 2022-09-19 PROCEDURE — 82962 GLUCOSE BLOOD TEST: CPT

## 2022-09-19 PROCEDURE — 36415 COLL VENOUS BLD VENIPUNCTURE: CPT | Performed by: INTERNAL MEDICINE

## 2022-09-19 PROCEDURE — 250N000013 HC RX MED GY IP 250 OP 250 PS 637: Performed by: PHYSICIAN ASSISTANT

## 2022-09-19 PROCEDURE — G0378 HOSPITAL OBSERVATION PER HR: HCPCS

## 2022-09-19 RX ORDER — TRAMADOL HYDROCHLORIDE 50 MG/1
25 TABLET ORAL 2 TIMES DAILY PRN
Qty: 10 TABLET | Refills: 0 | Status: SHIPPED | OUTPATIENT
Start: 2022-09-19 | End: 2022-10-03

## 2022-09-19 RX ORDER — WARFARIN SODIUM 5 MG/1
5 TABLET ORAL
Status: DISCONTINUED | OUTPATIENT
Start: 2022-09-19 | End: 2022-09-19 | Stop reason: HOSPADM

## 2022-09-19 RX ADMIN — LEVOTHYROXINE SODIUM 175 MCG: 100 TABLET ORAL at 08:41

## 2022-09-19 RX ADMIN — TRAMADOL HYDROCHLORIDE 25 MG: 50 TABLET ORAL at 08:42

## 2022-09-19 RX ADMIN — FUROSEMIDE 20 MG: 20 TABLET ORAL at 16:05

## 2022-09-19 RX ADMIN — TRAMADOL HYDROCHLORIDE 25 MG: 50 TABLET ORAL at 16:05

## 2022-09-19 RX ADMIN — VENLAFAXINE 37.5 MG: 37.5 TABLET ORAL at 08:42

## 2022-09-19 RX ADMIN — LISINOPRIL 20 MG: 20 TABLET ORAL at 08:41

## 2022-09-19 RX ADMIN — ACETAMINOPHEN 975 MG: 325 TABLET, FILM COATED ORAL at 14:30

## 2022-09-19 RX ADMIN — ASPIRIN 81 MG CHEWABLE TABLET 81 MG: 81 TABLET CHEWABLE at 08:40

## 2022-09-19 RX ADMIN — METOLAZONE 2.5 MG: 2.5 TABLET ORAL at 08:42

## 2022-09-19 RX ADMIN — METOLAZONE 2.5 MG: 2.5 TABLET ORAL at 16:05

## 2022-09-19 RX ADMIN — ACETAMINOPHEN 975 MG: 325 TABLET, FILM COATED ORAL at 06:33

## 2022-09-19 RX ADMIN — FUROSEMIDE 40 MG: 40 TABLET ORAL at 08:41

## 2022-09-19 ASSESSMENT — ACTIVITIES OF DAILY LIVING (ADL)
ADLS_ACUITY_SCORE: 20
ADLS_ACUITY_SCORE: 23
ADLS_ACUITY_SCORE: 20
ADLS_ACUITY_SCORE: 23
ADLS_ACUITY_SCORE: 23

## 2022-09-19 NOTE — PROGRESS NOTES
ANTICOAGULATION  MANAGEMENT: Discharge Review    Erich Craven chart reviewed for anticoagulation continuity of care    Hospital Admission on 9/17/22-9/19/22 for right ankle injury.    Discharge disposition: U - [place]     Results:    Recent labs: (last 7 days)     09/17/22  1350 09/18/22  0611 09/19/22  0718   INR 1.38* 1.28* 1.18*     Anticoagulation inpatient management:     home regimen continued    Anticoagulation discharge instructions:     Warfarin dosing: home regimen continued   Bridging: No   INR goal change: No      Medication changes affecting anticoagulation: Yes: Tramadol - According to Micromedex medication can prothrombin time and an increased risk of bleeding    Additional factors affecting anticoagulation: No     PLAN     No adjustment to anticoagulation plan needed    ACC will resume monitoring upon discharge from TCU    Anticoagulation Calendar updated    Roosevelt Badillo RN  
80

## 2022-09-19 NOTE — PROGRESS NOTES
Hospitalist service cross-cover note:     Paged by RN regarding patient request to discontinue glucose monitoring. Blood sugars have been stable without insulin needs so seems reasonable, discontinued glucose checks and insulin.     Richard Hernandez MD   Hospitalist  [phone]

## 2022-09-19 NOTE — PLAN OF CARE
Summary:    Care Plan Summary Note:  Orientation: A&OX4  Observation Goals (met & not met): Not Met  Activity Level: GB/Walker  Fall Risk: Y  Behavior & Aggression Tool Color: Green  Pain Management: Prn Tramadol  ABNL VS/O2: VSS/RA  ABNL Lab/BG: see chart  Diet: Mod Carb  Bowel/Bladder: Damian  Drains/Devices: PIV SL  Tests/Procedures for next shift: SW,PT  Anticipated DC date: TBD          Observation goals  PRIOR TO DISCHARGE        Comments: -diagnostic tests and consults completed and resulted Not Met  -vital signs normal or at patient baseline Met  -tolerating oral intake to maintain hydration Met  -adequate pain control on oral analgesics Met  -returns to baseline functional status Not Met  -safe disposition plan has been identified Not Met  Nurse to notify provider when observation goals have been met and patient is ready for discharge.

## 2022-09-19 NOTE — PROGRESS NOTES
Observation goals  PRIOR TO DISCHARGE        Comments: -diagnostic tests and consults completed and resulted Not Met  -vital signs normal or at patient baseline Met  -tolerating oral intake to maintain hydration Met  -adequate pain control on oral analgesics Met  -returns to baseline functional status Not Met  -safe disposition plan has been identified Not Met  Nurse to notify provider when observation goals have been met and patient is ready for discharge.

## 2022-09-19 NOTE — PROGRESS NOTES
Observation goals  PRIOR TO DISCHARGE        Comments:   -diagnostic tests and consults completed and resulted: met   -vital signs normal or at patient baseline: met   -tolerating oral intake to maintain hydration: met   -adequate pain control on oral analgesics: met   -returns to baseline functional status: not met   -safe disposition plan has been identified: met   Nurse to notify provider when observation goals have been met and patient is ready for discharge.

## 2022-09-19 NOTE — PLAN OF CARE
Orientation/Cognitive: A&Ox4  Observation Goals (Met/ Not Met): met  Mobility Level/Assist Equipment: Ax2 GB and walker, WBAT, using boot  Fall Risk (Y/N): yes  Behavior Concerns: none  Pain Management: PRN Tramadol, scheduled Tylenol, and ice packs   Tele/VS/O2: VSS on RA  ABNL Lab/BG: INR 1.18,  and 113  Diet: mod cho and low fat   Bowel/Bladder: using urinal at bedside, had 1 BM today   Skin Concerns: bruising and scabs, legs are alexys   Drains/Devices: IV-SL  Tests/Procedures for next shift: none   Anticipated DC date & active delays: today at 1630 to [place]   Patient Stated Goal for Today: pain control    AVS sent to TCU. IV removed. All belongings returned to patient. Home medications returned to patient (3 bags) #[id number], #[id number], and #[id number]. All questions answered. Discharging to [place] via wheelchair.

## 2022-09-19 NOTE — DISCHARGE SUMMARY
[facility]  Hospitalist Discharge Summary      Date of Admission:  9/17/2022  Date of Discharge:  9/19/2022  Discharging Provider: JoAnna K. Barthell, PA-C  Discharge Service: Hospitalist Service    Discharge Diagnoses   Acute right ankle sprain, possible peroneus longus tendon tear or tenosynovitis.  Hx DVT / PE  Chronic anticoagulation with Coumadin, goal INR 2-3  Paroxysmal atrial fibrillation.  CAD (no hx PCI)   HTN   Chronic diastolic heart failure with HFpEF  Chronic lymphedema  Left lower extremity recent vein procedure with outside vascular surgeon.  RIN  Chronic COPD, not in an acute exacerbation  Hypothyroidism  Prediabetes.  CKD 3.  Follow-ups Needed After Discharge   Follow-up Appointments     Follow Up and recommended labs and tests      Follow up with skilled nursing physician.  The following labs/tests are   recommended: INR 9/20. INR 1.18 day of discharge.    Follow-up with [facility] Dr. Santillan in 2 weeks. His care   coordinator is Candi Schneider. Please contact her at [phone] to schedule   an appointment.     Dr. Santillan sees patient's at 2 clinic locations:  1. [facility] [place]  [address] [place] [address]  2. [facility] [place]  [address] , [address],   [place] [address]           Discharge Disposition   Discharged to short-term care facility  Condition at discharge: Stable    Hospital Course   Erich Craven is a 82 year old male with complex PMHx including hypothyroidism, RIN, COPD, CKD3, HTN, DM2, CAD, chronic HFpEF, pAF, and DVT / PE who presented with difficulty ambulating d/t right ankle pain after eversion STEVEN of right ankle while stepping off a curb.     Acute right ankle sprain.  No fracture / dislocation on ankle XR.  - Appreciate orthopedic consult.  - Obtain CT right fool look for occult fracture.  - Ortho boot in place  - PT evaluation recommended TCU  - Pain control with ray APAP and PRN  Tramadol avail  - Last BM AM 9/19.     Hx DVT / PE  Chronic anticoagulation with Coumadin, goal INR 2-3  Had left shoulder injection 9/15 and held his Coumadin 5 days prior to that, restarted evening 9/15 as directed.  [PTA: M / W / F 5mg, all other days 7.5mg]  - Pharm consult for warfarin dosing     Paroxysmal atrial fibrillation.  - Cont diltiazem and pharm to dose warfarin.     CAD (no hx PCI)   HTN   Chronic diastolic heart failure with HFpEF  Follows with [facility]. Weight remained stable, no in acute exacerbation.  [PTA: ASA 81mg, atorvastatin 40mg QHS, carvedilol 12.5mg BID (not taking), diltiazem er 300mg, furosemide 40mg QAM and 20mg in evening, Imdur 30mg, lisinopril 20mg, metolazone 2.5mg BID, spironolactone 25mg QOD]  - Daily weights, I&Os, low-sodium diet.   - Cont PTA regimen with exception beta blocker as is reportedly not taking per med rec.     Chronic lymphedema  Left lower extremity recent vein procedure with outside vascular surgeon.  - Cont PTA diuretics as above  - Recommend OP follow up with PCP and Vascular surgery     RIN  - Continue BIPAP per home settings     Chronic COPD, not in an acute exacerbation  - Cont PTA medications     Hypothyroidism  - Cont PTA Synthroid     Prediabetes. A1C 5.9 (8/2/2022).  Not on meds PTA.  - Glucose monitoring discontinued given stable BG readings without need for insulin.     CKD 3.  On PTA diuretics.  - Monitor periodically.    Consultations This Hospital Stay   ORTHOPEDIC SURGERY IP CONSULT  PHARMACY TO DOSE WARFARIN  CARE MANAGEMENT / SOCIAL WORK IP CONSULT  PHYSICAL THERAPY ADULT IP CONSULT  LYMPHEDEMA THERAPY IP CONSULT  PHYSICAL THERAPY ADULT IP CONSULT  OCCUPATIONAL THERAPY ADULT IP CONSULT  LYMPHEDEMA THERAPY IP CONSULT    Code Status   No CPR- Do NOT Intubate    Time Spent on this Encounter   I, JoAnna K. Barthell, PA-C, personally saw the patient today and spent greater than 30 minutes discharging this patient.     This patient was discussed  with Dr. Ludwig of the Hospitalist Service who agrees with current plans as outlined above.    JoAnna K. Barthell, PA-C  [facility]TEND[facility]ORT STAY  [address] [address]  Phone: [phone]  ______________________________________________________________________    Physical Exam   Temp: 97.9  F (36.6  C) Temp src: Oral BP: (!) 144/80 Pulse: 62   Resp: 16 SpO2: 93 % O2 Device: None (Room air)    Vitals:    09/17/22 1216 09/19/22 1300   Weight: 117.9 kg (260 lb) 113.3 kg (249 lb 12.5 oz)   Constitutional: Appears stated age, no acute distress.  Respiratory: Breath sounds CTA. No increased work of breathing on room air.  Cardiovascular: RRR, no rub or murmur. Trace peripheral edema with chronic appearing hyperpigmentation.  GI: Soft, overweight, non-tender, non-distended.  Skin: Warm, dry.  MSK: Right and left LE viewed for comparison. No apparent swelling R ankle or foot. Exquisite focal TTP anteroinferior to lateral malleolus.       Primary Care Physician   Anne Heath    Discharge Orders      Primary Care - Care Coordination Referral      General info for SNF    Length of Stay Estimate: Short Term Care: Estimated # of Days <30  Condition at Discharge: Stable  Level of care:skilled   Rehabilitation Potential: Fair  Admission H&P remains valid and up-to-date: Yes  Recent Chemotherapy: N/A  Use Nursing Home Standing Orders: Yes     Mantoux instructions    Give two-step Mantoux (PPD) Per Facility Policy Yes     Reason for your hospital stay    Further evaluation and management of right ankle pain.     Follow Up and recommended labs and tests    Follow up with half-way physician.  The following labs/tests are recommended: INR 9/20. INR 1.18 day of discharge.    Follow-up with [facility] Dr. Santillan in 2 weeks. His care coordinator is Candi Schneider. Please contact her at [phone] to schedule an appointment.     Dr. Santillan sees patient's at 2  clinic locations:  1. [facility] [place]  [address] [place] [address]  2. [facility] [place]  [address], [address], [place] [address]     Activity - Up with assistive device     Intake and output    Every shift     Daily weights    Call Provider for weight gain of more than 2 pounds per day or 5 pounds per week.     Weight bearing status    Boot when out of bed or ambulating  May WBAT with walker or cane as needed     Additional Discharge Instructions    Continue BIPAP at night per home settings.     No CPR- Do NOT Intubate     Physical Therapy Adult Consult    Evaluate and treat as clinically indicated.    Reason:  right ankle pain with possible peroneus longus tendon tear / tenosynovitis     Occupational Therapy Adult Consult    Evaluate and treat as clinically indicated.    Reason:  right ankle pain with possible peroneus longus tendon tear / tenosynovitis     Lymphedema Therapy Adult Consult    Evaluate and treat as clinically indicated.    Reason:  lymphedema     Advance Diet as Tolerated    Follow this diet upon discharge: Low Saturated Fat Na <2400mg Diet       Significant Results and Procedures   Most Recent 3 CBC's:Recent Labs   Lab Test 09/18/22  0611 09/17/22  1350 08/02/22  0829   WBC 12.3* 11.2* 10.1   HGB 11.0* 12.0* 12.6*   MCV 96 95 96    205 283     Most Recent 3 BMP's:Recent Labs   Lab Test 09/19/22  1206 09/19/22  0738 09/18/22  2302 09/18/22  0751 09/18/22  0611 09/17/22  1627 09/17/22  1350 08/02/22  0829   NA  --   --   --   --  139  --  138 136   POTASSIUM  --   --   --   --  4.1  --  3.9 3.6   CHLORIDE  --   --   --   --  106  --  103 97   CO2  --   --   --   --  27  --  30 31   BUN  --   --   --   --  29  --  42* 32*   CR  --   --   --   --  0.74  --  0.86 1.01   ANIONGAP  --   --   --   --  6  --  5 8   LAWSON  --   --   --   --  9.4  --  9.6 9.3   * 106* 123*   < > 119*   < > 102* 109*    < > = values in this interval not  displayed.     Most Recent 2 LFT's:Recent Labs   Lab Test 08/02/22  0829 07/14/22  2327   AST 13 27   ALT 17 19   ALKPHOS 100 99   BILITOTAL 0.4 0.4     Most Recent 3 INR's:Recent Labs   Lab Test 09/19/22  0718 09/18/22  0611 09/17/22  1350   INR 1.18* 1.28* 1.38*     Most Recent 3 BNP's:Recent Labs   Lab Test 05/03/22  1656 03/27/22  1600 06/14/21  1455 02/19/21  1528   NTBNPI  --  154  --   --    NTBNP 175  --  64 136   ,   Results for orders placed or performed during the hospital encounter of 09/17/22   Ankle XR, G/E 3 views, right    Narrative    EXAM: XR ANKLE RIGHT G/E 3 VIEWS  LOCATION: [facility]  DATE/TIME: 9/17/2022 1:03 PM    INDICATION: right ankle and foot injury  COMPARISON: None.      Impression    IMPRESSION: Diffuse soft tissue swelling. No fractures are identified. The ankle mortise is intact. The talar dome is unremarkable. Atheromatous calcification. Plantar calcaneal spur.   Foot  XR, G/E 3 views, right    Narrative    EXAM: XR FOOT RIGHT G/E 3 VIEWS  LOCATION: [facility]  DATE/TIME: 9/17/2022 1:03 PM    INDICATION: right foot and ankle injury  COMPARISON: None.      Impression    IMPRESSION: Dorsal soft tissue swelling. No fractures are identified. Atheromatous calcification. Plantar calcaneal spur.   CT Foot Right w/o Contrast    Narrative    CT RIGHT FOOT WITHOUT CONTRAST   9/19/2022 1:53 PM     HISTORY:  Eversion STEVEN right ankle, exquisite TTP anteroinferior  lateral malleolus.    COMPARISON: Radiographs from 9/17/2022.    TECHNIQUE: Axial thin section CT images of the foot with 2-D  reformats. Dose reduction techniques utilized.    FINDINGS:  Bones and joints: Moderate-sized plantar calcaneal enthesophyte. Mild  osteoarthrosis of the talonavicular joint and calcaneocuboid joint.  There is osteoarthrosis in the navicular cuneiform joints, moderate to  severe at the navicular middle cuneiform and navicular lateral  cuneiform  joints.    Muscles and tendons: Fluid in the expected location of the distal  peroneus longus tendon and adjacent soft tissue edema suggesting  tenosynovitis and/or tendon tear. No other gross tendon pathology.  There is diffuse muscle atrophy.    Other soft tissues: There is edema in the soft tissues diffusely. No  hematoma, cyst or mass. Calcifications in the plantar fascia  consistent with prior plantar fascia pathology. Arterial  calcifications.      Impression    IMPRESSION:  1. Fluid in the expected location of the distal peroneus longus  tendon, concerning for tendon tear or tenosynovitis. MRI could further  evaluate if clinically warranted.  2. Multifocal osteoarthrosis.  3. Diffuse muscle atrophy and arterial calcifications. There is also  calcification in the plantar fascia consistent with previous plantar  fascia pathology.    JESSICA WILLIS MD         SYSTEM ID:  [id number]   CT Ankle Right w/o Contrast    Narrative    CT ANKLE RIGHT WITHOUT CONTRAST September 19, 2022 1:54 PM     HISTORY: Pain. Trauma. Evaluate for occult fracture.    COMPARISON: Radiographs from 9/17/2022.    TECHNIQUE: Axial thin section CT images of the ankle with 2-D  reformats. Dose reduction techniques utilized.    FINDINGS:    Bones and joints: Moderate sized plantar calcaneal enthesophyte. Mild  osteoarthrosis at the talonavicular joint and calcaneocuboid joint.  Moderate to severe osteoarthrosis at the navicular cuneiform joints,  particularly the navicular middle and navicular lateral cuneiform  joints. No evidence of fracture.    Soft tissues: Questionable discontinuity in the peroneus longus tendon  and fluid along the expected tendon tract. Clinical correlation  needed. No other gross tendon pathology. Mild to moderate diffuse  muscle atrophy. Extensive arterial calcifications. Superficial soft  tissue swelling.      Impression    IMPRESSION:  1. There is no evidence of fracture.  2. Questionable peroneus longus tendon  tear and surrounding fluid. MRI  could further evaluate if clinically warranted and the patient is MRI  safe.  3. Multifocal osteoarthrosis, particularly at the navicular middle  cuneiform and navicular lateral cuneiform joints.  4. Muscle atrophy and arterial calcifications incidentally noted.    JESSICA WILLIS MD         SYSTEM ID:  [id number]       Discharge Medications   Current Discharge Medication List      CONTINUE these medications which have CHANGED    Details   traMADol (ULTRAM) 50 MG tablet Take 0.5 tablets (25 mg) by mouth 2 times daily as needed for severe pain  Qty: 10 tablet, Refills: 0    Associated Diagnoses: Chronic left shoulder pain         CONTINUE these medications which have NOT CHANGED    Details   Acetylcarnitine HCl (ACETYL L-CARNITINE PO) Take 1 capsule by mouth daily      albuterol (PROAIR HFA/PROVENTIL HFA/VENTOLIN HFA) 108 (90 Base) MCG/ACT inhaler Inhale 2 puffs into the lungs 4 times daily as needed for shortness of breath / dyspnea or wheezing      aspirin (ASA) 81 MG chewable tablet Take 1 tablet by mouth daily      atorvastatin (LIPITOR) 40 MG tablet TAKE 1 TABLET BY MOUTH EVERY EVENING  Qty: 90 tablet, Refills: 2    Associated Diagnoses: Hyperlipidemia LDL goal <100      diltiazem ER COATED BEADS (CARDIZEM CD/CARTIA XT) 300 MG 24 hr capsule Take 1 capsule (300 mg) by mouth daily  Qty: 90 capsule, Refills: 0    Associated Diagnoses: Essential hypertension      Fluticasone-Umeclidin-Vilanterol (TRELEGY ELLIPTA) 200-62.5-25 MCG/INH oral inhaler Inhale 1 puff into the lungs daily      furosemide (LASIX) 20 MG tablet Take 2 tabs in morning and 1 tab in the sfternoon  Qty: 360 tablet, Refills: 3    Associated Diagnoses: Chronic diastolic heart failure (H)      isosorbide mononitrate (IMDUR) 30 MG 24 hr tablet Take 1 tablet (30 mg) by mouth daily  Qty: 90 tablet, Refills: 0    Associated Diagnoses: (HFpEF) heart failure with preserved ejection fraction (H)      levothyroxine  (SYNTHROID/LEVOTHROID) 175 MCG tablet Take 1 tablet (175 mcg) by mouth daily  Qty: 90 tablet, Refills: 0    Associated Diagnoses: Hypothyroidism, unspecified type      lisinopril (ZESTRIL) 20 MG tablet Take 1 tablet (20 mg) by mouth daily  Qty: 90 tablet, Refills: 3    Associated Diagnoses: Chronic diastolic heart failure (H); Essential hypertension      metolazone (ZAROXOLYN) 2.5 MG tablet Take 1 tablet (2.5 mg) by mouth 2 times daily  Qty: 180 tablet, Refills: 0    Associated Diagnoses: Chronic diastolic heart failure (H)      Multiple vitamin TABS Take 1 tablet by mouth daily      spironolactone (ALDACTONE) 25 MG tablet Take 1 tablet (25 mg) by mouth every other day  Qty: 45 tablet, Refills: 2    Associated Diagnoses: Lymphedema      tamsulosin (FLOMAX) 0.4 MG capsule Take 1 capsule (0.4 mg) by mouth daily  Qty: 90 capsule, Refills: 2    Associated Diagnoses: Hypothyroidism, unspecified type      TAURINE PO Take 1 capsule by mouth daily      venlafaxine (EFFEXOR) 37.5 MG tablet Take 1 tablet (37.5 mg) by mouth 2 times daily  Qty: 180 tablet, Refills: 0    Comments: Follow up office visit is required for further refills.  Associated Diagnoses: Anxiety      warfarin ANTICOAGULANT (COUMADIN) 5 MG tablet Take by mouth 5 mg every Sunday & Thursday; 7.5 mg all other days of the week or as directed by the Anticoagulation Clinic  Qty: 120 tablet, Refills: 1    Associated Diagnoses: Atrial fibrillation (H)         STOP taking these medications       acetaminophen (TYLENOL) 500 MG tablet Comments:   Reason for Stopping:         carvedilol (COREG) 12.5 MG tablet Comments:   Reason for Stopping:             Allergies   Allergies   Allergen Reactions     No Known Allergies

## 2022-09-19 NOTE — PROGRESS NOTES
Observation goals  PRIOR TO DISCHARGE        Comments:   -diagnostic tests and consults completed and resulted: not met   -vital signs normal or at patient baseline: met   -tolerating oral intake to maintain hydration: met   -adequate pain control on oral analgesics: met   -returns to baseline functional status: not met   -safe disposition plan has been identified: not met   Nurse to notify provider when observation goals have been met and patient is ready for discharge.

## 2022-09-19 NOTE — PROGRESS NOTES
Patient has refused to wear bipap tonight. V60 unit is not in room. No respiratory distress noted. Will continue to monitor.

## 2022-09-19 NOTE — PROGRESS NOTES
Care Management Discharge Note    Discharge Date: 09/20/2022       Discharge Disposition: Transitional Care    Discharge Services:  Transitional care    Discharge DME:      Discharge Transportation: [facility]    Private pay costs discussed: Not applicable    PAS Confirmation Code:  [id number]  Patient/family educated on Medicare website which has current facility and service quality ratings: yes    Education Provided on the Discharge Plan:  yes  Persons Notified of Discharge Plans: patient  Patient/Family in Agreement with the Plan: yes    Handoff Referral Completed: Yes    Additional Information:  [facility] are picking patient up at 1630 via [facility]. Paged hospitalist for orders. Writer let patient know of this and he will take his own bipap to [place].    Writer completed PAS, put in chart and sent to facility. Writer received orders, faxed discharge orders to [facility].    PAS-RR    D: Per DHS regulation, SW completed and submitted PAS-RR to [place] [employer] Direct Connect via the [employer].  PAS-RR confirmation # is : [id number]    I: SW spoke with patient and they are aware a PAS-RR has been submitted.  SW reviewed with patient that they may be contacted for a follow up appointment within 10 days of hospital discharge if their SNF stay is < 30 days.  Contact information for [employer] was also provided.    A: Patient verbalized understanding.    P: Further questions may be directed to [employer] Line at #[phone], option #4 for PAS-RR staff.    ORAL Stinson

## 2022-09-19 NOTE — PROGRESS NOTES
Care Management Follow Up    Length of Stay (days): 0    Expected Discharge Date: 09/20/2022     Concerns to be Addressed:       Patient plan of care discussed at interdisciplinary rounds: Yes    Anticipated Discharge Disposition: Transitional Care     Anticipated Discharge Services:    Anticipated Discharge DME:      Patient/family educated on Medicare website which has current facility and service quality ratings: yes  Education Provided on the Discharge Plan:    Patient/Family in Agreement with the Plan: yes    Referrals Placed by CM/SW: Post Acute Facilities  Private pay costs discussed: Not applicable    Additional Information:  Writer received a message from [facility] stating that they are able to accept patient. Writer left a message asking when they could give patient a bed.    Addendum: [facility] called stating that they could give patient a bed today or tomorrow. Writer paged hospitalist asking when patient would be ready for discharge. Hospitalist said patient would be ready to discharge.    Will continue to follow.    ORAL Stinson

## 2022-09-19 NOTE — PROGRESS NOTES
Observation goals  PRIOR TO DISCHARGE        Comments:   -diagnostic tests and consults completed and resulted: met   -vital signs normal or at patient baseline: met   -tolerating oral intake to maintain hydration: met   -adequate pain control on oral analgesics: met   -returns to baseline functional status: not met   -safe disposition plan has been identified: not met   Nurse to notify provider when observation goals have been met and patient is ready for discharge.

## 2022-09-19 NOTE — PROGRESS NOTES
[facility]    Medicine History and Physical - Hospitalist Service       Date of Admission:  9/17/2022    Assessment & Plan   Erich Craven is a 82 year old male with complex PMHx including hypothyroidism, RIN, COPD, CKD3, HTN, DM2, CAD, chronic HFpEF, pAF, and DVT / PE who presented with difficulty ambulating d/t right ankle pain after eversion STEVEN of right ankle while stepping off a curb.    Acute right ankle sprain.  No fracture / dislocation on ankle XR.  - Appreciate orthopedic consult.  - Obtain CT right fool look for occult fracture.  - Ortho boot in place  - PT evaluation recommended TCU  - Pain control with ray APAP and PRN Tramadol avail     Hx DVT / PE  Chronic anticoagulation with Coumadin, goal INR 2-3  Had left shoulder injection 9/15 and held his Coumadin 5 days prior to that, restarted evening 9/15 as directed.  [PTA: M / W / F 5mg, all other days 7.5mg]  - Pharm consult for warfarin dosing    Paroxysmal atrial fibrillation.  - Cont diltiazem and pharm to dose warfarin.    CAD (no hx PCI)   HTN   Chronic diastolic heart failure with HFpEF  Follows with [facility]. Weight remained stable, no in acute exacerbation.  [PTA: ASA 81mg, atorvastatin 40mg QHS, carvedilol 12.5mg BID (not taking), diltiazem er 300mg, furosemide 40mg QAM and 20mg in evening, Imdur 30mg, lisinopril 20mg, metolazone 2.5mg BID, spironolactone 25mg QOD]  - Daily weights, I&Os, low-sodium diet.   - Cont PTA regimen with exception beta blocker as is reportedly not taking per med rec.    Chronic lymphedema  Left lower extremity recent vein procedure with outside vascular surgeon.  - Cont PTA diuretics as above  - Recommend OP follow up with PCP and Vascular surgery    RIN  - Continue BIPAP per home settings     Chronic COPD, not in an acute exacerbation  - Cont PTA medications     Hypothyroidism  - Cont PTA Synthroid     Prediabetes. A1C 5.9 (8/2/2022).  Not on meds PTA.  - Glucose monitoring discontinued  "given stable BG readings without need for insulin.     CKD 3.  On PTA diuretics.  - Monitor periodically.    Clinically Significant Risk Factors Present on Admission               # Coagulation Defect: home medication list includes an anticoagulant medication   # Hypertension: home medication list includes antihypertensive(s)    # Obesity: Estimated body mass index is 37.31 kg/m  as calculated from the following:    Height as of 9/15/22: 1.778 m (5' 10\").    Weight as of this encounter: 117.9 kg (260 lb).        Diet: Combination Diet Moderate Consistent Carb (60 g CHO per Meal) Diet; Low Saturated Fat Na <2400mg Diet    Reddy Catheter: Not present    DVT Prophylaxis: Warfarin  Code Status: No CPR- Do NOT Intubate       Expected Discharge Date: 09/20/2022        Discharge Comments: Ortho consult.  PT consult.  placement needs?       The patient's care was discussed with the Attending Physician, Dr. Ludwig, Bedside Nurse, Care Coordinator/ and Patient.    JoAnna K. Barthell, PA-C  Hospitalist Service  [facility]    ______________________________________________________________________    Interval History   Pain adequately controlled at rest, still unable to bare weight. Last bm this AM. Denies cp, sob, abd pain. I&Os reviewed. Need weight.    Data reviewed today: I reviewed all medications, new labs and imaging results over the last 24 hours. I personally reviewed no images or EKG's today.    Physical Exam   Vital Signs: Temp: 97.9  F (36.6  C) Temp src: Oral BP: (!) 144/80 Pulse: 62   Resp: 16 SpO2: 93 % O2 Device: None (Room air)    Weight: 260 lbs 0 oz  Constitutional: Appears stated age, no acute distress.  Respiratory: Breath sounds CTA. No increased work of breathing on room air.  Cardiovascular: RRR, no rub or murmur. Trace peripheral edema with chronic appearing hyperpigmentation.  GI: Soft, overweight, non-tender, non-distended.  Skin: Warm, dry.  MSK: Right and left LE " viewed for comparison. No apparent swelling R ankle or foot. Exquisite focal TTP anteroinferior to lateral malleolus.    Medications       acetaminophen  975 mg Oral Q8H     aspirin  81 mg Oral Daily     diltiazem ER COATED BEADS  300 mg Oral QPM     Fluticasone-Umeclidin-Vilanterol  1 puff Inhalation Daily     furosemide  20 mg Oral Daily at 4 pm     furosemide  40 mg Oral Daily     isosorbide mononitrate  30 mg Oral QPM     levothyroxine  175 mcg Oral Daily     lisinopril  20 mg Oral Daily     metolazone  2.5 mg Oral BID     spironolactone  25 mg Oral Every Other Day     tamsulosin  0.4 mg Oral QPM     venlafaxine  37.5 mg Oral BID     warfarin ANTICOAGULANT  5 mg Oral ONCE at 18:00     Warfarin Therapy Reminder  1 each Oral See Admin Instructions       Data   Recent Labs   Lab 09/19/22  0738 09/19/22  0718 09/18/22  2302 09/18/22 2007 09/18/22  0751 09/18/22  0611 09/17/22  1627 09/17/22  1350   WBC  --   --   --   --   --  12.3*  --  11.2*   HGB  --   --   --   --   --  11.0*  --  12.0*   MCV  --   --   --   --   --  96  --  95   PLT  --   --   --   --   --  205  --  205   INR  --  1.18*  --   --   --  1.28*  --  1.38*   NA  --   --   --   --   --  139  --  138   POTASSIUM  --   --   --   --   --  4.1  --  3.9   CHLORIDE  --   --   --   --   --  106  --  103   CO2  --   --   --   --   --  27  --  30   BUN  --   --   --   --   --  29  --  42*   CR  --   --   --   --   --  0.74  --  0.86   ANIONGAP  --   --   --   --   --  6  --  5   LAWSON  --   --   --   --   --  9.4  --  9.6   *  --  123* 107*   < > 119*   < > 102*    < > = values in this interval not displayed.       Imaging:  No results found for this or any previous visit (from the past 24 hour(s)).

## 2022-09-19 NOTE — PROVIDER NOTIFICATION
MD Notification    Notified Person: MD    Notified Person Name:YASMIN ANDUJAR MD       Notification Date/Time:September 19, 2022 1:49 AM      Notification Interaction:pager    Purpose of Notification:fyi  bed no 528  patient didn't want to use the bipap and patient having normal blood sugar, and patient would like to discontinue blood sugar monitoring,   lurdes Rn  *[id number]    Orders Received: discontinued blood glucose monitoring     Comments:

## 2022-09-19 NOTE — CONSULTS
[facility]    Orthopedic Consultation    Erich Craven MRN# [id number]   Age: 82 year old YOB: 1940     Date of Admission:  9/17/2022    Reason for consult: Right ankle pain       Requesting physician: Dr. Hallman       Level of consult: One-time consult to assist in determining a diagnosis, recommend an appropriate treatment plan and place orders           Assessment and Plan:   Assessment:   Right ankle sprain      Plan:   Non-operative treatment  Boot when OOB or ambulating  May WBAT with walker or cane as needed  CT ordered to rule out occult fracture  Dr. Santillan's care coordinator is Candi Schneider. Please contact her at [phone] to schedule an appointment.  Follow-up in 2 weeks      Dr. Santillan sees patient's at 2 clinic locations:  1. [facility]  ? [address] [place] [address]  2. [facility]   ? [address], [address], [place] [id number]           Chief Complaint:   Right ankle sprain         History of Present Illness:   This patient is a 82 year old male who presents with the following condition requiring a hospital admission:    Patient was walking his dog on 9/17 and stepped down onto a curb wrong resulting in an inversion injury of his ankle. He felt and heard a crack and has had pain in the ankle since. He has not been able to put his weight on it since. He was given a Boot and attempted to ambulate with PT but it is still too painful.   Patient lives at home alone, independently. He does not normally ambulate with an assistive device          Past Medical History:     Past Medical History:   Diagnosis Date     (HFpEF) heart failure with preserved ejection fraction (H)      Arthritis      BPH with urinary obstruction      CAD (coronary artery disease)     PCI w/ SUMMER in 2014     Cardiac pacemaker in situ      Chronic diastolic heart failure (H)      Complication of anesthesia      COPD (chronic  obstructive pulmonary disease) (H)      Diabetes (H)      DVT (deep venous thrombosis) (H)      Dyspnea      Essential hypertension      Fatigue      HLD (hyperlipidemia)      Hx of long term use of blood thinners     Due to PE. Started in 2012     Hypothyroidism      Lymphedema      Mumps      Obesity, Class III, BMI 40-49.9 (morbid obesity) (H)      RIN (obstructive sleep apnea)     has refused CPAP     Pacemaker      Paroxysmal A-fib (H)      PE (pulmonary thromboembolism) (H)              Past Surgical History:     Past Surgical History:   Procedure Laterality Date     cardiac stenting       CV RIGHT HEART CATH MEASUREMENTS RECORDED N/A 11/5/2019    Procedure: Right Heart Cath;  Surgeon: Roberto Hernandez MD;  [facility]     ENT SURGERY      tonsillectomy     IMPLANT PACEMAKER       INJECT NERVE BLOCK SUPRASCAPULAR Bilateral 8/26/2021    Procedure: BLOCK, NERVE, SUPRASCAPULAR, Bilateral, under ultrasound guidance;  Surgeon: Swetha Allen MD;  Location: [facility] OR     INJECT NERVE BLOCK SUPRASCAPULAR Bilateral 9/23/2021    Procedure: bilateral suprascapular nerve block;  Surgeon: Swetha Allen MD;  Location: [facility] OR     INJECT NERVE BLOCK SUPRASCAPULAR Left 9/15/2022    Procedure: BLOCK, NERVE, SUPRASCAPULAR- left;  Surgeon: Swetha Allen MD;  Location: [facility] OR     NERVE BLOCK PERIPHERAL Bilateral 3/10/2022    Procedure: bilateral suprascapular nerve block;  Surgeon: Swetha Allen MD;  Location: [facility] OR     ORTHOPEDIC SURGERY Right 2012    knee replacement             Social History:     Social History     Tobacco Use     Smoking status: Former Smoker     Packs/day: 0.50     Years: 25.00     Pack years: 12.50     Smokeless tobacco: Never Used     Tobacco comment: quit in 1989   Substance Use Topics     Alcohol use: 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; recovering             Family History:     Family History   Problem Relation Age of Onset     Hypertension Father               Immunizations:     VACCINE/DOSE   Diptheria   DPT   DTAP   HBIG   Hepatitis A   Hepatitis B   HIB   Influenza   Measles   Meningococcal   MMR   Mumps   Pneumococcal   Polio   Rubella   Small Pox   TDAP   Varicella   Zoster             Allergies:     Allergies   Allergen Reactions     No Known Allergies              Medications:     Current Facility-Administered Medications   Medication     acetaminophen (TYLENOL) tablet 975 mg     albuterol (PROVENTIL HFA/VENTOLIN HFA) inhaler     aspirin (ASA) chewable tablet 81 mg     bisacodyl (DULCOLAX) suppository 10 mg     glucose gel 15-30 g    Or     dextrose 50 % injection 25-50 mL    Or     glucagon injection 1 mg     diltiazem ER COATED BEADS (CARDIZEM CD/CARTIA XT) 24 hr capsule 300 mg     Fluticasone-Umeclidin-Vilanterol (TRELEGY ELLIPTA) 200-62.5-25 MCG/INH oral inhaler 1 puff     furosemide (LASIX) tablet 20 mg     furosemide (LASIX) tablet 40 mg     isosorbide mononitrate (IMDUR) 24 hr tablet 30 mg     levothyroxine (SYNTHROID/LEVOTHROID) tablet 175 mcg     lisinopril (ZESTRIL) tablet 20 mg     melatonin tablet 1 mg     metolazone (ZAROXOLYN) tablet 2.5 mg     naloxone (NARCAN) injection 0.2 mg    Or     naloxone (NARCAN) injection 0.4 mg    Or     naloxone (NARCAN) injection 0.2 mg    Or     naloxone (NARCAN) injection 0.4 mg     ondansetron (ZOFRAN ODT) ODT tab 4 mg    Or     ondansetron (ZOFRAN) injection 4 mg     prochlorperazine (COMPAZINE) injection 5 mg    Or     prochlorperazine (COMPAZINE) tablet 5 mg    Or     prochlorperazine (COMPAZINE) suppository 12.5 mg     senna-docusate (SENOKOT-S/PERICOLACE) 8.6-50 MG per tablet 1 tablet    Or     senna-docusate (SENOKOT-S/PERICOLACE) 8.6-50 MG per tablet 2 tablet     spironolactone (ALDACTONE) tablet 25 mg     tamsulosin (FLOMAX) capsule 0.4 mg     traMADol (ULTRAM) half-tab 25 mg     venlafaxine (EFFEXOR) tablet 37.5 mg     warfarin ANTICOAGULANT (COUMADIN) tablet 5 mg     Warfarin Dose Required Daily - Pharmacist  Managed             Review of Systems:   CV: NEGATIVE for chest pain, palpitations or peripheral edema  C: NEGATIVE for fever, chills, change in weight  E/M: NEGATIVE for ear, mouth and throat problems  R: NEGATIVE for significant cough or SOB          Physical Exam:   All vitals have been reviewed  Patient Vitals for the past 24 hrs:   BP Temp Temp src Pulse Resp SpO2   09/19/22 1102 (!) 144/80 97.9  F (36.6  C) Oral 62 16 93 %   09/19/22 0940 -- -- -- -- 16 --   09/19/22 0842 -- -- -- -- 16 --   09/19/22 0729 (!) 146/81 98.3  F (36.8  C) Oral 63 16 91 %   09/19/22 0400 (!) 157/97 98.2  F (36.8  C) Axillary -- 16 92 %   09/18/22 1941 (!) 146/80 98.7  F (37.1  C) Oral 59 18 90 %   09/18/22 1545 (!) 159/79 98.4  F (36.9  C) Oral 63 18 92 %   09/18/22 1513 -- -- -- -- 16 --   09/18/22 1431 -- -- -- -- 16 --       Intake/Output Summary (Last 24 hours) at 9/19/2022 1329  Last data filed at 9/19/2022 0900  Gross per 24 hour   Intake --   Output 1275 ml   Net -1275 ml     Patient laying comfortably in bed  Right LE with venous stasis changes  Mild lateral ankle swelling  No TTP over proximal fibula  Severe pain with palpation of the ATFL, PTFLm and CCFL  No pain with dorsi and plantar flexion  Pain with passive inversion of the ankle  No TTP over metatarsals  Sensation intact to light touch  Brisk cap refill  No apparent or obvious deformities  + DP pulse           Data:   All laboratory data reviewed  Results for orders placed or performed during the hospital encounter of 09/17/22   Ankle XR, G/E 3 views, right     Status: None    Narrative    EXAM: XR ANKLE RIGHT G/E 3 VIEWS  LOCATION: [facility]  DATE/TIME: 9/17/2022 1:03 PM    INDICATION: right ankle and foot injury  COMPARISON: None.      Impression    IMPRESSION: Diffuse soft tissue swelling. No fractures are identified. The ankle mortise is intact. The talar dome is unremarkable. Atheromatous calcification. Plantar calcaneal spur.   Foot   XR, G/E 3 views, right     Status: None    Narrative    EXAM: XR FOOT RIGHT G/E 3 VIEWS  LOCATION: [facility]  DATE/TIME: 9/17/2022 1:03 PM    INDICATION: right foot and ankle injury  COMPARISON: None.      Impression    IMPRESSION: Dorsal soft tissue swelling. No fractures are identified. Atheromatous calcification. Plantar calcaneal spur.   Basic metabolic panel     Status: Abnormal   Result Value Ref Range    Sodium 138 133 - 144 mmol/L    Potassium 3.9 3.4 - 5.3 mmol/L    Chloride 103 94 - 109 mmol/L    Carbon Dioxide (CO2) 30 20 - 32 mmol/L    Anion Gap 5 3 - 14 mmol/L    Urea Nitrogen 42 (H) 7 - 30 mg/dL    Creatinine 0.86 0.66 - 1.25 mg/dL    Calcium 9.6 8.5 - 10.1 mg/dL    Glucose 102 (H) 70 - 99 mg/dL    GFR Estimate 86 >60 mL/min/1.73m2   INR     Status: Abnormal   Result Value Ref Range    INR 1.38 (H) 0.85 - 1.15   Asymptomatic COVID-19 Virus (Coronavirus) by PCR Nasopharyngeal     Status: Normal    Specimen: Nasopharyngeal; Swab   Result Value Ref Range    SARS CoV2 PCR Negative Negative    Narrative    Testing was performed using the Xpert Xpress SARS-CoV-2 Assay on the   Cepheid Gene-Xpert Instrument Systems. Additional information about   this Emergency Use Authorization (EUA) assay can be found via the Lab   Guide. This test should be ordered for the detection of SARS-CoV-2 in   individuals who meet SARS-CoV-2 clinical and/or epidemiological   criteria. Test performance is unknown in asymptomatic patients. This   test is for in vitro diagnostic use under the FDA EUA for   laboratories certified under CLIA to perform high complexity testing.   This test has not been FDA cleared or approved. A negative result   does not rule out the presence of PCR inhibitors in the specimen or   target RNA in concentration below the limit of detection for the   assay. The possibility of a false negative should be considered if   the patient's recent exposure or clinical presentation suggests    COVID-19. This test was validated by the [facility]. This laboratory is certified under the Clinical Laboratory Improvement Amendments of 1988 (CLIA-88) as qualified to perform high complexity laboratory testing.     CBC with platelets and differential     Status: Abnormal   Result Value Ref Range    WBC Count 11.2 (H) 4.0 - 11.0 10e3/uL    RBC Count 4.02 (L) 4.40 - 5.90 10e6/uL    Hemoglobin 12.0 (L) 13.3 - 17.7 g/dL    Hematocrit 38.0 (L) 40.0 - 53.0 %    MCV 95 78 - 100 fL    MCH 29.9 26.5 - 33.0 pg    MCHC 31.6 31.5 - 36.5 g/dL    RDW 16.2 (H) 10.0 - 15.0 %    Platelet Count 205 150 - 450 10e3/uL    % Neutrophils 78 %    % Lymphocytes 13 %    % Monocytes 7 %    % Eosinophils 1 %    % Basophils 0 %    % Immature Granulocytes 1 %    NRBCs per 100 WBC 0 <1 /100    Absolute Neutrophils 8.7 (H) 1.6 - 8.3 10e3/uL    Absolute Lymphocytes 1.4 0.8 - 5.3 10e3/uL    Absolute Monocytes 0.8 0.0 - 1.3 10e3/uL    Absolute Eosinophils 0.1 0.0 - 0.7 10e3/uL    Absolute Basophils 0.0 0.0 - 0.2 10e3/uL    Absolute Immature Granulocytes 0.1 <=0.4 10e3/uL    Absolute NRBCs 0.0 10e3/uL   Magnesium     Status: Normal   Result Value Ref Range    Magnesium 2.2 1.6 - 2.3 mg/dL   Glucose by meter     Status: Abnormal   Result Value Ref Range    GLUCOSE BY METER POCT 107 (H) 70 - 99 mg/dL   Glucose by meter     Status: Abnormal   Result Value Ref Range    GLUCOSE BY METER POCT 106 (H) 70 - 99 mg/dL   Basic metabolic panel     Status: Abnormal   Result Value Ref Range    Sodium 139 133 - 144 mmol/L    Potassium 4.1 3.4 - 5.3 mmol/L    Chloride 106 94 - 109 mmol/L    Carbon Dioxide (CO2) 27 20 - 32 mmol/L    Anion Gap 6 3 - 14 mmol/L    Urea Nitrogen 29 7 - 30 mg/dL    Creatinine 0.74 0.66 - 1.25 mg/dL    Calcium 9.4 8.5 - 10.1 mg/dL    Glucose 119 (H) 70 - 99 mg/dL    GFR Estimate 90 >60 mL/min/1.73m2   CBC with platelets     Status: Abnormal   Result Value Ref Range    WBC Count 12.3 (H) 4.0 - 11.0  10e3/uL    RBC Count 3.64 (L) 4.40 - 5.90 10e6/uL    Hemoglobin 11.0 (L) 13.3 - 17.7 g/dL    Hematocrit 34.8 (L) 40.0 - 53.0 %    MCV 96 78 - 100 fL    MCH 30.2 26.5 - 33.0 pg    MCHC 31.6 31.5 - 36.5 g/dL    RDW 16.2 (H) 10.0 - 15.0 %    Platelet Count 205 150 - 450 10e3/uL   INR     Status: Abnormal   Result Value Ref Range    INR 1.28 (H) 0.85 - 1.15   Glucose by meter     Status: Abnormal   Result Value Ref Range    GLUCOSE BY METER POCT 111 (H) 70 - 99 mg/dL   Glucose by meter     Status: Abnormal   Result Value Ref Range    GLUCOSE BY METER POCT 105 (H) 70 - 99 mg/dL   Glucose by meter     Status: Abnormal   Result Value Ref Range    GLUCOSE BY METER POCT 120 (H) 70 - 99 mg/dL   Glucose by meter     Status: Abnormal   Result Value Ref Range    GLUCOSE BY METER POCT 107 (H) 70 - 99 mg/dL   Glucose by meter     Status: Abnormal   Result Value Ref Range    GLUCOSE BY METER POCT 123 (H) 70 - 99 mg/dL   INR     Status: Abnormal   Result Value Ref Range    INR 1.18 (H) 0.85 - 1.15   Glucose by meter     Status: Abnormal   Result Value Ref Range    GLUCOSE BY METER POCT 106 (H) 70 - 99 mg/dL   Glucose by meter     Status: Abnormal   Result Value Ref Range    GLUCOSE BY METER POCT 113 (H) 70 - 99 mg/dL   CBC with platelets differential     Status: Abnormal    Narrative    The following orders were created for panel order CBC with platelets differential.  Procedure                               Abnormality         Status                     ---------                               -----------         ------                     CBC with platelets and d...[[id number]]  Abnormal            Final result                 Please view results for these tests on the individual orders.          Attestation:  I have reviewed today's vital signs, notes, medications, labs and imaging with Dr. Santillan.  Amount of time performed on this consult: 30 minutes.    Daria Mg PA-C

## 2022-09-20 ENCOUNTER — PATIENT OUTREACH (OUTPATIENT)
Dept: CARE COORDINATION | Facility: CLINIC | Age: 82
End: 2022-09-20

## 2022-09-20 VITALS
DIASTOLIC BLOOD PRESSURE: 76 MMHG | TEMPERATURE: 97.6 F | BODY MASS INDEX: 35.84 KG/M2 | SYSTOLIC BLOOD PRESSURE: 139 MMHG | OXYGEN SATURATION: 94 % | HEART RATE: 84 BPM | RESPIRATION RATE: 18 BRPM | HEIGHT: 70 IN

## 2022-09-20 PROCEDURE — 36415 COLL VENOUS BLD VENIPUNCTURE: CPT | Performed by: NURSE PRACTITIONER

## 2022-09-20 PROCEDURE — P9604 ONE-WAY ALLOW PRORATED TRIP: HCPCS | Performed by: NURSE PRACTITIONER

## 2022-09-20 PROCEDURE — 86481 TB AG RESPONSE T-CELL SUSP: CPT | Performed by: NURSE PRACTITIONER

## 2022-09-20 NOTE — PROGRESS NOTES
[facility]    Background: Transitional Care Management program auto-identified and prompting a chart review by [facility] [place] team.    Assessment: Upon chart review, [facility] Team member will cancel/close this episode of Transitional Care Management program due to reason below:    Patient discharged to TCU/ARU/LTACH and is established within [facility] Primary Care. Referral created for Primary Care-Care Coordination program.    Plan: Transitional Care Management episode closed per reason above.      VERONIKA Salinas  [facility] [place], [facility]    *Connected Care Resource Team does NOT follow patient ongoing. Referrals are identified based on internal discharge reports and the outreach is to ensure patient has an understanding of their discharge instructions.

## 2022-09-20 NOTE — PROGRESS NOTES
Clinic Care Coordination Contact  Care Coordination Transition Communication         Clinical Data: [facility]  Hospitalist Discharge Summary       Date of Admission:  9/17/2022  Date of Discharge:  9/19/2022  Discharging Provider: JoAnna K. Barthell, PA-C  Discharge Service: Hospitalist Service        Discharge Diagnoses     Acute right ankle sprain, possible peroneus longus tendon tear or tenosynovitis.  Hx DVT / PE  Chronic anticoagulation with Coumadin, goal INR 2-3  Paroxysmal atrial fibrillation.  CAD (no hx PCI)   HTN   Chronic diastolic heart failure with HFpEF  Chronic lymphedema  Left lower extremity recent vein procedure with outside vascular surgeon.  RIN  Chronic COPD, not in an acute exacerbation  Hypothyroidism  Prediabetes.  CKD 3    Transition to Facility:              Facility Name: [facility]              Contact name and phone number/fax: [phone]/ [phone]    Plan: RN/SW Care Coordinator will await notification from facility staff informing RN/SW Care Coordinator of patient's discharge plans/needs. RN/SW Care Coordinator will review chart and outreach to facility staff every 4 weeks and as needed.     Ksenia Farnsworth [facility] Center[facility]ERARDO  Clinic Care Coordinator  [facility]  [phone]  [email or web address]

## 2022-09-21 ENCOUNTER — LAB REQUISITION (OUTPATIENT)
Dept: LAB | Facility: CLINIC | Age: 82
End: 2022-09-21

## 2022-09-21 ENCOUNTER — TRANSITIONAL CARE UNIT VISIT (OUTPATIENT)
Dept: GERIATRICS | Facility: CLINIC | Age: 82
End: 2022-09-21
Payer: MEDICARE

## 2022-09-21 DIAGNOSIS — I48.0 PAROXYSMAL A-FIB (H): ICD-10-CM

## 2022-09-21 DIAGNOSIS — N18.31 STAGE 3A CHRONIC KIDNEY DISEASE (H): ICD-10-CM

## 2022-09-21 DIAGNOSIS — I50.32 CHRONIC DIASTOLIC HEART FAILURE (H): ICD-10-CM

## 2022-09-21 DIAGNOSIS — I50.32 CHRONIC DIASTOLIC (CONGESTIVE) HEART FAILURE (H): ICD-10-CM

## 2022-09-21 DIAGNOSIS — I25.119 CORONARY ARTERY DISEASE INVOLVING NATIVE CORONARY ARTERY WITH ANGINA PECTORIS, UNSPECIFIED WHETHER NATIVE OR TRANSPLANTED HEART (H): ICD-10-CM

## 2022-09-21 DIAGNOSIS — Z79.01 LONG TERM CURRENT USE OF ANTICOAGULANTS WITH INR GOAL OF 2.0-3.0: ICD-10-CM

## 2022-09-21 DIAGNOSIS — J42 CHRONIC BRONCHITIS, UNSPECIFIED CHRONIC BRONCHITIS TYPE (H): ICD-10-CM

## 2022-09-21 DIAGNOSIS — I10 ESSENTIAL HYPERTENSION: ICD-10-CM

## 2022-09-21 DIAGNOSIS — I50.33 ACUTE ON CHRONIC HEART FAILURE WITH PRESERVED EJECTION FRACTION (H): ICD-10-CM

## 2022-09-21 DIAGNOSIS — G47.33 OSA (OBSTRUCTIVE SLEEP APNEA): Primary | ICD-10-CM

## 2022-09-21 DIAGNOSIS — E66.01 MORBID OBESITY (H): ICD-10-CM

## 2022-09-21 LAB
QUANTIFERON MITOGEN: 1.29 IU/ML
QUANTIFERON NIL TUBE: 0.04 IU/ML
QUANTIFERON TB1 TUBE: 0.04 IU/ML
QUANTIFERON TB2 TUBE: 0.04

## 2022-09-21 PROCEDURE — 99310 SBSQ NF CARE HIGH MDM 45: CPT | Performed by: NURSE PRACTITIONER

## 2022-09-21 RX ORDER — ACETAMINOPHEN 500 MG
1000 TABLET ORAL 3 TIMES DAILY
Status: ON HOLD | COMMUNITY
End: 2023-02-22

## 2022-09-21 RX ORDER — POLYETHYLENE GLYCOL 3350 17 G/17G
1 POWDER, FOR SOLUTION ORAL DAILY
Status: ON HOLD | COMMUNITY
End: 2023-02-22

## 2022-09-21 NOTE — PROGRESS NOTES
"[facility] GERIATRICS    Chief Complaint   Patient presents with     RECHECK     HPI:  Erich Craven is a 82 year old  (1940), who is being seen today for an episodic care visit at:  (TCU) [[id number]].  Patient hospitalization at [facility] from 9/17/2022-9/19/2022.    This is a 81 yo male with PMHx chronic diastolic heart failure with preserved EF, lymphedema, PAF on Coumadin, COPD, CKD stage IIIa who presented eliciting pain when stepped off curb.  He was found to have an acute right ankle sprain, no fracture, given cam boot and Tylenol and tramadol for pain control.  Of note had a left shoulder injection on 9/15 so his Coumadin was held prior, restarted at discharge.  Per diastolic heart failure follows with [facility], follows PTA regiment other than not taking carvedilol.  No changes noted, discharged to [facility] for rehab.  [id number]}  Post Medication Reconciliation Status:        Allergies, and PMH/PSH reviewed in EPIC today.  REVIEW OF SYSTEMS:  10 point ROS of systems including Constitutional, Eyes, Respiratory, Cardiovascular, Gastroenterology, Genitourinary, Integumentary, Musculoskeletal, Psychiatric were all negative except for pertinent positives noted in my HPI.    Objective:   /76   Pulse 84   Temp 97.6  F (36.4  C)   Resp 18   Ht 1.778 m (5' 10\")   SpO2 94%   BMI 35.84 kg/m    GENERAL APPEARANCE:  Alert, oriented, morbidly obese, cooperative, R foot pain is periodic  ENT:  Mouth and posterior oropharynx normal, moist mucous membranes, normal hearing acuity  NECK:  No adenopathy,masses or thyromegaly  RESP:  no respiratory distress, crackles bibasilar, room air  CV:  irregular rhythm Per rate controlled A. fib, no murmur, dependent 1+ BLE edema, PPM  ABDOMEN:  normal bowel sounds, soft, nontender, no hepatosplenomegaly or other masses, has constipation  M/S:   Using CAM boot on R foot  SKIN:  bruised, hemosiderin changes bilaterally in lower " extremities  NEURO:   No focal deficits  PSYCH:  oriented X 3, memory impaired       Most Recent 3 CBC's:  Recent Labs   Lab Test 09/18/22  0611 09/17/22  1350 08/02/22  0829   WBC 12.3* 11.2* 10.1   HGB 11.0* 12.0* 12.6*   MCV 96 95 96    205 283     Most Recent 3 BMP's:  Recent Labs   Lab Test 09/19/22  1206 09/19/22  0738 09/18/22  2302 09/18/22  0751 09/18/22  0611 09/17/22  1627 09/17/22  1350 08/02/22  0829   NA  --   --   --   --  139  --  138 136   POTASSIUM  --   --   --   --  4.1  --  3.9 3.6   CHLORIDE  --   --   --   --  106  --  103 97   CO2  --   --   --   --  27  --  30 31   BUN  --   --   --   --  29  --  42* 32*   CR  --   --   --   --  0.74  --  0.86 1.01   ANIONGAP  --   --   --   --  6  --  5 8   LAWSON  --   --   --   --  9.4  --  9.6 9.3   * 106* 123*   < > 119*   < > 102* 109*    < > = values in this interval not displayed.       Assessment/Plan:    Right ankle sprain  Given weightbearing as tolerated in cam boot    Pain  Increase Tylenol to 1000 mg 3 times daily, continue tramadol 25 mg twice daily as needed    (I48.0) Paroxysmal A-fib (H)  (Z79.01) Long term current use of anticoagulants with INR goal of 2.0-3.0  Last INR 1.3, continue Coumadin 7 mg daily, recheck INR on 9/23    (I50.32) Chronic diastolic heart failure (H)  (I50.33) Acute on chronic heart failure with preserved ejection fraction (H)  (I10) Essential hypertension  PAF  PTA carvedilol not taking, continue diltiazem 300 mg daily, furosemide 40 mg in a.m. and 20 mg in afternoon, isosorbide 30 mg daily, metolazone 2.5 mg twice daily and spironolactone 25 mg every other day.  Start daily weights.  Using TG shapes    (I25.119) Coronary artery disease involving native coronary artery with angina pectoris, unspecified whether native or transplanted heart (H)  Continue aspirin 81 mg daily and statin    (G47.33) RIN (obstructive sleep apnea)   Continue CPAP at bedtime    (J42) Chronic bronchitis, unspecified chronic  bronchitis type (H)  Continue Trelegy elliptica 200/62.5/25 1 puff daily, room air    (E66.01) Morbid obesity (H)  Last BMI 35.8    (N18.31) Stage 3a chronic kidney disease (H)  Last creatinine 0.74 with GFR >90, recheck BMP on 9/22    Leukocytosis  Last WBC 12.3, recheck CBC on 9/22    Normocytic normochromic anemia  Last hemoglobin 11.0, recheck CBC on 9/22    Hypothyroidism  Continue levothyroxine 175 MCG daily, last TSH 1.01 on 8/2/2022    Constipation  Start MiraLAX daily    BPH  Continue Flomax 0.4 mg daily    Depression  Continue Effexor 37.5 mg twice daily    Orders:  Increase blood pressure monitoring, start Tylenol, lymphedema, daily weights, MiraLAX, lab recheck    Total time spent with patient visit at the [facility] nursing facility was 35 min including patient visit and review of past records. Greater than 50% of total time spent with counseling and coordinating care due to start increased blood pressure monitoring per potential for hypotension, start Tylenol for pain control, lymphedema and daily weights for CHF, start MiraLAX for constipation, BMP/CBC  recheck per renal function and anemia with therapy is lasted 18 minutes.    Electronically signed by: Erich Meade NP

## 2022-09-22 VITALS
DIASTOLIC BLOOD PRESSURE: 58 MMHG | WEIGHT: 246.9 LBS | BODY MASS INDEX: 35.43 KG/M2 | HEART RATE: 69 BPM | TEMPERATURE: 97.5 F | OXYGEN SATURATION: 93 % | SYSTOLIC BLOOD PRESSURE: 95 MMHG | RESPIRATION RATE: 18 BRPM

## 2022-09-22 LAB
ANION GAP SERPL CALCULATED.3IONS-SCNC: 7 MMOL/L (ref 3–14)
BUN SERPL-MCNC: 54 MG/DL (ref 7–30)
CALCIUM SERPL-MCNC: 9 MG/DL (ref 8.5–10.1)
CHLORIDE BLD-SCNC: 100 MMOL/L (ref 94–109)
CO2 SERPL-SCNC: 27 MMOL/L (ref 20–32)
CREAT SERPL-MCNC: 1.64 MG/DL (ref 0.66–1.25)
ERYTHROCYTE [DISTWIDTH] IN BLOOD BY AUTOMATED COUNT: 16.4 % (ref 10–15)
GAMMA INTERFERON BACKGROUND BLD IA-ACNC: 0.04 IU/ML
GFR SERPL CREATININE-BSD FRML MDRD: 41 ML/MIN/1.73M2
GLUCOSE BLD-MCNC: 110 MG/DL (ref 70–99)
HCT VFR BLD AUTO: 42.4 % (ref 40–53)
HGB BLD-MCNC: 13.1 G/DL (ref 13.3–17.7)
M TB IFN-G BLD-IMP: NEGATIVE
M TB IFN-G CD4+ BCKGRND COR BLD-ACNC: 1.25 IU/ML
MCH RBC QN AUTO: 29.8 PG (ref 26.5–33)
MCHC RBC AUTO-ENTMCNC: 30.9 G/DL (ref 31.5–36.5)
MCV RBC AUTO: 96 FL (ref 78–100)
MITOGEN IGNF BCKGRD COR BLD-ACNC: 0 IU/ML
MITOGEN IGNF BCKGRD COR BLD-ACNC: 0 IU/ML
PLATELET # BLD AUTO: 252 10E3/UL (ref 150–450)
POTASSIUM BLD-SCNC: 3.4 MMOL/L (ref 3.4–5.3)
RBC # BLD AUTO: 4.4 10E6/UL (ref 4.4–5.9)
SODIUM SERPL-SCNC: 134 MMOL/L (ref 133–144)
WBC # BLD AUTO: 13.5 10E3/UL (ref 4–11)

## 2022-09-22 PROCEDURE — 85027 COMPLETE CBC AUTOMATED: CPT | Performed by: NURSE PRACTITIONER

## 2022-09-22 PROCEDURE — P9604 ONE-WAY ALLOW PRORATED TRIP: HCPCS | Performed by: NURSE PRACTITIONER

## 2022-09-22 PROCEDURE — 36415 COLL VENOUS BLD VENIPUNCTURE: CPT | Performed by: NURSE PRACTITIONER

## 2022-09-22 PROCEDURE — 80048 BASIC METABOLIC PNL TOTAL CA: CPT | Performed by: NURSE PRACTITIONER

## 2022-09-23 ENCOUNTER — TRANSITIONAL CARE UNIT VISIT (OUTPATIENT)
Dept: GERIATRICS | Facility: CLINIC | Age: 82
End: 2022-09-23
Payer: MEDICARE

## 2022-09-23 DIAGNOSIS — J42 CHRONIC BRONCHITIS, UNSPECIFIED CHRONIC BRONCHITIS TYPE (H): Primary | ICD-10-CM

## 2022-09-23 DIAGNOSIS — I10 ESSENTIAL HYPERTENSION: ICD-10-CM

## 2022-09-23 DIAGNOSIS — I50.32 CHRONIC DIASTOLIC HEART FAILURE (H): ICD-10-CM

## 2022-09-23 DIAGNOSIS — N18.31 STAGE 3A CHRONIC KIDNEY DISEASE (H): ICD-10-CM

## 2022-09-23 DIAGNOSIS — E66.01 MORBID OBESITY (H): ICD-10-CM

## 2022-09-23 DIAGNOSIS — I89.0 LYMPHEDEMA: ICD-10-CM

## 2022-09-23 DIAGNOSIS — Z79.01 LONG TERM CURRENT USE OF ANTICOAGULANTS WITH INR GOAL OF 2.0-3.0: ICD-10-CM

## 2022-09-23 PROCEDURE — 99309 SBSQ NF CARE MODERATE MDM 30: CPT | Performed by: NURSE PRACTITIONER

## 2022-09-23 NOTE — PROGRESS NOTES
[facility] GERIATRICS    Chief Complaint   Patient presents with     RECHECK     HPI:  Erich Craven is a 82 year old  (1940), who is being seen today for an episodic care visit at: [facility] (TCU) [[id number]].     This is a 81 yo male with PMHx chronic diastolic heart failure with preserved EF, lymphedema, PAF on Coumadin, COPD, CKD stage IIIa who presented eliciting pain when stepped off curb.  He was found to have an acute right ankle sprain, no fracture, given cam boot and Tylenol and tramadol for pain control.  Of note had a left shoulder injection on 9/15 so his Coumadin was held prior, restarted at discharge.  Per diastolic heart failure follows with [facility], follows PTA regiment other than not taking carvedilol.  No changes noted, discharged to [facility] for rehab.    Today's concern is:  Hypotension, CHF, therapy progress    Allergies, and PMH/PSH reviewed in EPIC today.  REVIEW OF SYSTEMS:  4 point ROS including Respiratory, CV, GI and , other than that noted in the HPI,  is negative    Objective:   BP 95/58   Pulse 69   Temp 97.5  F (36.4  C)   Resp 18   Wt 112 kg (246 lb 14.4 oz)   SpO2 93%   PF 70 L/min   BMI 35.43 kg/m    GENERAL APPEARANCE:  Alert, oriented, morbidly obese, cooperative, R foot pain controlled  RESP:  no respiratory distress, crackles bibasilar, room air  CV:  irregular rhythm Per rate controlled A. fib, no murmur, dependent 1+ BLE edema, PPM  M/S:   Using CAM boot on R foot  PSYCH:  oriented X 3, memory impaired. SLUMS 24/30      Most Recent 3 CBC's:  Recent Labs   Lab Test 09/18/22  0611 09/17/22  1350 08/02/22  0829   WBC 12.3* 11.2* 10.1   HGB 11.0* 12.0* 12.6*   MCV 96 95 96    205 283     Most Recent 3 BMP's:  Recent Labs   Lab Test 09/19/22  1206 09/19/22  0738 09/18/22  2302 09/18/22  0751 09/18/22  0611 09/17/22  1627 09/17/22  1350 08/02/22  0829   NA  --   --   --   --  139  --  138 136   POTASSIUM  --   --   --   --  4.1  --  3.9 3.6    CHLORIDE  --   --   --   --  106  --  103 97   CO2  --   --   --   --  27  --  30 31   BUN  --   --   --   --  29  --  42* 32*   CR  --   --   --   --  0.74  --  0.86 1.01   ANIONGAP  --   --   --   --  6  --  5 8   LAWSON  --   --   --   --  9.4  --  9.6 9.3   * 106* 123*   < > 119*   < > 102* 109*    < > = values in this interval not displayed.       Assessment/Plan:    Right ankle sprain  Given weightbearing as tolerated in cam boot, continue therapy     Pain  Using Tylenol 1000 mg 3 times daily, continue tramadol 25 mg twice daily as needed (0 doses used). Denies pain     (I48.0) Paroxysmal A-fib (H)  (Z79.01) Long term current use of anticoagulants with INR goal of 2.0-3.0  Last INR 2.3, continue Coumadin 6 mg daily, recheck INR on 9/26     (I50.32) Chronic diastolic heart failure (H)  (I50.33) Acute on chronic heart failure with preserved ejection fraction (H)  (I10) Essential hypertension  PAF  PTA carvedilol not taking, continue diltiazem 300 mg daily, furosemide 40 mg in a.m. and 20 mg in afternoon (hold for SBP <130), isosorbide 30 mg daily, metolazone 2.5 mg twice daily (on hold per hypotension/JEYSON) and spironolactone 25 mg every other day.  daily weights. 246lb (stable). Using TG shapes     (I25.119) Coronary artery disease involving native coronary artery with angina pectoris, unspecified whether native or transplanted heart (H)  Continue aspirin 81 mg daily and statin     (G47.33) RIN (obstructive sleep apnea)   Continue CPAP at bedtime     (J42) Chronic bronchitis, unspecified chronic bronchitis type (H)  COPD  Continue Trelegy elliptica 200/62.5/25 1 puff daily, room air. No change     (E66.01) Morbid obesity (H)  Last BMI 35.4     (N18.31) Stage 3a chronic kidney disease (H)  Last creatinine 1.64 with 41 on 9/22, recheck BMP on 9/27     Leukocytosis  Last WBC 13.5 on 9/22, UA/UC sent. Recheck CBC on 9/27     Normocytic normochromic anemia  Last hemoglobin 13.1 on 9/22, recheck CBC on  9/27     Hypothyroidism  Continue levothyroxine 175 MCG daily, last TSH 1.01 on 8/2/2022     Constipation  Using MiraLAX daily, adequate     BPH  Continue Flomax 0.4 mg daily, no change     Depression  Continue Effexor 37.5 mg twice daily, stable    Therapy  Working on strengthening    Orders:  Hold metolazone, hold parameters, BMP/CBC    Electronically signed by: Erich Meade NP

## 2022-09-26 ENCOUNTER — MEDICAL CORRESPONDENCE (OUTPATIENT)
Dept: HEALTH INFORMATION MANAGEMENT | Facility: CLINIC | Age: 82
End: 2022-09-26

## 2022-09-26 ENCOUNTER — LAB REQUISITION (OUTPATIENT)
Dept: LAB | Facility: CLINIC | Age: 82
End: 2022-09-26

## 2022-09-26 DIAGNOSIS — I50.32 CHRONIC DIASTOLIC (CONGESTIVE) HEART FAILURE (H): ICD-10-CM

## 2022-09-26 RX ORDER — FUROSEMIDE 20 MG
20 TABLET ORAL EVERY EVENING
COMMUNITY
End: 2022-10-04

## 2022-09-26 RX ORDER — FUROSEMIDE 40 MG
40 TABLET ORAL EVERY MORNING
COMMUNITY
End: 2022-10-04

## 2022-09-26 NOTE — PROGRESS NOTES
[facility]  INITIAL VISIT NOTE  September 27, 2022    PRIMARY CARE PROVIDER AND CLINIC:  Anne Heath Ricky [address] / [place] [address]    CHIEF COMPLAINT:  Hospital follow-up/Initial visit    HPI:    Erich Craven is a 82 year old  (1940) male who was seen at [facility] T[facility] on September 27, 2022 for an initial visit.     Medical history is notable for CAD, HFpEF, pulmonary hypertension, lymphedema, hypertension, dyslipidemia, PAF, sick sinus syndrome, s/p permanent pacemaker, PE, COPD, hypothyroidism, BPH, prediabetes, CKD, chronic anemia, anxiety, depression, shingles, osteoarthritis, severe obesity, and RIN.      Summary of hospital course:  Patient was hospitalized at [facility] from September 17 through September 19, 2022 for acute right ankle sprain and possible peroneus longus tendon tear or tenosynovitis. Injury sustained when he was stepping off a curb.  CT scan revealed fluid in the expected location of the distal peroneus longus tendon, multifocal osteoarthritis, diffuse muscle atrophy, and no evidence of fracture.  He was evaluated by orthopedic service and they recommended nonoperative treatment with CAM boot and possible WBAT with walker or cane.  TCU was recommended per therapies.    Patient is admitted to this facility for medical management, nursing care, and rehab.     Of note, history was obtained from patient, facility RN, and extensive review of the chart.    Today's visit:  Patient was seen in his room, while lying in bed.  The pain is his right ankle has markedly subsided.  He feels dry in his mouth.  He reports no fever, chills, chest pain, palpitation, dyspnea, nausea, vomiting, abdominal pain, or urinary symptoms.  He is able to urinate and urine in the urinal looks clear.  He had a bowel movement yesterday.  He states that he has a lot of anxiety and he is requesting to have low-dose clonazepam as needed (he takes PRN  lorazepam at home too).      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  Chronic anemia, baseline hemoglobin around 12  Diverticulosis of colon  Diverticulitis of colon in July 2022  Pulmonary nodules  Mumps  Probable left frontal meningioma  Shingles  Anxiety  Depression  Osteoarthritis  Severe obesity, BMI 35.5  RIN, on BiPAP      Past Medical History:   Diagnosis Date     (HFpEF) heart failure with preserved ejection fraction (H)      Arthritis      BPH with urinary obstruction      CAD (coronary artery disease)     PCI w/ SUMMER in 2014     Cardiac pacemaker in situ      Chronic diastolic heart failure (H)      Complication of anesthesia      COPD (chronic obstructive pulmonary disease) (H)      Diabetes (H)      DVT (deep venous thrombosis) (H)      Dyspnea      Essential hypertension      Fatigue      HLD (hyperlipidemia)      Hx of long term use of blood thinners     Due to PE. Started in 2012     Hypothyroidism      Lymphedema      Mumps      Obesity, Class III, BMI 40-49.9 (morbid obesity) (H)      RIN (obstructive sleep apnea)     has refused CPAP     Pacemaker      Paroxysmal A-fib (H)      PE (pulmonary thromboembolism) (H)        PAST SURGICAL HISTORY:   Past Surgical History:   Procedure Laterality Date     cardiac stenting       CV RIGHT HEART CATH MEASUREMENTS RECORDED N/A 11/5/2019    Procedure: Right Heart Cath;  Surgeon: Roberto Hernandez MD;  Location:  HEART CARDIAC CATH LAB     ENT SURGERY      tonsillectomy     IMPLANT PACEMAKER       INJECT NERVE BLOCK SUPRASCAPULAR  Bilateral 8/26/2021    Procedure: BLOCK, NERVE, SUPRASCAPULAR, Bilateral, under ultrasound guidance;  Surgeon: Swetha Allen MD;  Location: [facility] OR     INJECT NERVE BLOCK SUPRASCAPULAR Bilateral 9/23/2021    Procedure: bilateral suprascapular nerve block;  Surgeon: Swetha Allen MD;  Location: [facility] OR     INJECT NERVE BLOCK SUPRASCAPULAR Left 9/15/2022    Procedure: BLOCK, NERVE, SUPRASCAPULAR- left;  Surgeon: Swetha Allen MD;  Location: [facility] OR     NERVE BLOCK PERIPHERAL Bilateral 3/10/2022    Procedure: bilateral suprascapular nerve block;  Surgeon: Swetha Allen MD;  Location: [facility] OR     ORTHOPEDIC SURGERY Right 2012    knee replacement       FAMILY HISTORY:   Family History   Problem Relation Age of Onset     Hypertension Father        SOCIAL HISTORY:  Social History     Tobacco Use     Smoking status: Former Smoker     Packs/day: 0.50     Years: 25.00     Pack years: 12.50     Smokeless tobacco: Never Used     Tobacco comment: quit in 1989   Substance Use Topics     Alcohol use: 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; recovering       MEDICATIONS:  Current Outpatient Medications   Medication Sig Dispense Refill     acetaminophen (TYLENOL) 500 MG tablet Take 1,000 mg by mouth 3 times daily       Acetylcarnitine HCl (ACETYL L-CARNITINE PO) Take 1 capsule by mouth daily (Patient not taking: Reported on 9/21/2022)       albuterol (PROAIR HFA/PROVENTIL HFA/VENTOLIN HFA) 108 (90 Base) MCG/ACT inhaler Inhale 2 puffs into the lungs 4 times daily as needed for shortness of breath / dyspnea or wheezing       aspirin (ASA) 81 MG chewable tablet Take 1 tablet by mouth daily       atorvastatin (LIPITOR) 40 MG tablet TAKE 1 TABLET BY MOUTH EVERY EVENING 90 tablet 2     diltiazem ER COATED BEADS (CARDIZEM CD/CARTIA XT) 300 MG 24 hr capsule Take 1 capsule (300 mg) by mouth daily 90 capsule 0     Fluticasone-Umeclidin-Vilanterol (TRELEGY ELLIPTA) 200-62.5-25 MCG/INH oral inhaler Inhale 1 puff into the lungs daily        "furosemide (LASIX) 20 MG tablet Take 2 tabs in morning and 1 tab in the sfternoon 360 tablet 3     isosorbide mononitrate (IMDUR) 30 MG 24 hr tablet Take 1 tablet (30 mg) by mouth daily 90 tablet 0     levothyroxine (SYNTHROID/LEVOTHROID) 175 MCG tablet Take 1 tablet (175 mcg) by mouth daily 90 tablet 0     lisinopril (ZESTRIL) 20 MG tablet Take 1 tablet (20 mg) by mouth daily 90 tablet 3     metolazone (ZAROXOLYN) 2.5 MG tablet Take 1 tablet (2.5 mg) by mouth 2 times daily (Patient not taking: Reported on 9/24/2022) 180 tablet 0     Multiple vitamin TABS Take 1 tablet by mouth daily       polyethylene glycol (MIRALAX) 17 g packet Take 1 packet by mouth daily       spironolactone (ALDACTONE) 25 MG tablet Take 1 tablet (25 mg) by mouth every other day 45 tablet 2     tamsulosin (FLOMAX) 0.4 MG capsule Take 1 capsule (0.4 mg) by mouth daily 90 capsule 2     TAURINE PO Take 1 capsule by mouth daily (Patient not taking: Reported on 9/21/2022)       traMADol (ULTRAM) 50 MG tablet Take 0.5 tablets (25 mg) by mouth 2 times daily as needed for severe pain 10 tablet 0     venlafaxine (EFFEXOR) 37.5 MG tablet Take 1 tablet (37.5 mg) by mouth 2 times daily 180 tablet 0     warfarin ANTICOAGULANT (COUMADIN) 5 MG tablet Take by mouth 5 mg every Sunday & Thursday; 7.5 mg all other days of the week or as directed by the Anticoagulation Clinic (Patient taking differently: 6 mg daily Recheck INR on 9/26) 120 tablet 1         Post Medication Reconciliation Status:  Reconciled and changed; see notes/orders.    ALLERGIES:  Allergies   Allergen Reactions     No Known Allergies        ROS:  10 point ROS were negative other than the symptoms noted above in the HPI.    PHYSICAL EXAM:  Vital signs were reviewed in the chart.  Vital Signs: /66   Pulse 98   Temp 98.2  F (36.8  C)   Resp 18   Ht 1.778 m (5' 10\")   Wt 112 kg (247 lb)   SpO2 95%   BMI 35.44 kg/m    General: Comfortable and in no acute distress  HEENT: No " conjunctival pallor, no scleral icterus or injection, moist oral mucosa  Cardiovascular: Normal S1, S2, RRR  Respiratory: Lungs clear to auscultation bilaterally  GI: Abdomen soft, non-tender, non-distended, +BS  Extremities: No significant LE edema  Neuro: CX II-XII grossly intact; ROM in all four extremities grossly intact  Psych: Alert and oriented x3; normal affect  Skin: No acute rash    LABORATORY/IMAGING DATA:  All relevant labs and imaging data in [facility] and/or Care Everywhere were personally reviewed today.    Hematology profile (September 22, 2022): White count 13.5, hemoglobin 13.1, platelet count 252,000, MCV 96    Chemistry profile (September 22, 2022): Sodium 134, potassium 3.4, BUN 54, creatinine 1.64, glucose 110, calcium 9    Most Recent 3 CBC's:Recent Labs   Lab Test 09/18/22  0611 09/17/22  1350 08/02/22  0829   WBC 12.3* 11.2* 10.1   HGB 11.0* 12.0* 12.6*   MCV 96 95 96    205 283     Most Recent 3 BMP's:Recent Labs   Lab Test 09/19/22  1206 09/19/22  0738 09/18/22  2302 09/18/22  0751 09/18/22  0611 09/17/22  1627 09/17/22  1350 08/02/22  0829   NA  --   --   --   --  139  --  138 136   POTASSIUM  --   --   --   --  4.1  --  3.9 3.6   CHLORIDE  --   --   --   --  106  --  103 97   CO2  --   --   --   --  27  --  30 31   BUN  --   --   --   --  29  --  42* 32*   CR  --   --   --   --  0.74  --  0.86 1.01   ANIONGAP  --   --   --   --  6  --  5 8   LAWSON  --   --   --   --  9.4  --  9.6 9.3   * 106* 123*   < > 119*   < > 102* 109*    < > = values in this interval not displayed.     Most Recent 2 LFT's:Recent Labs   Lab Test 08/02/22  0829 07/14/22  2327   AST 13 27   ALT 17 19   ALKPHOS 100 99   BILITOTAL 0.4 0.4     Most Recent TSH and T4:Recent Labs   Lab Test 08/02/22  0829   TSH 1.01         ASSESSMENT/PLAN:  Right ankle sprain, subsequent encounter.  Nonoperative management per Ortho.  Pain is improving.  Plan:  Fall precautions  Continue right CAM boot  Continue pain management  with scheduled acetaminophen and PRN tramadol  DVT prophylaxis with chronic warfarin  Follow-up with Ortho as directed    DONA,  CKD stage II-IIIa.  Baseline creatinine historically in the range of 1-1.5.  Creatinine was 0.86 on September 17.  Creatinine increased to 1.64 on September 22; likely due to dehydration.  PTA metolazone was held on September 23.  UA unremarkable.  Plan:  Avoid NSAIDs and nephrotoxic  Follow-up on BMP from today, September 27  Hold other diuretics and lisinopril if renal function worsens    CAD, s/p SUMMER to proximal LAD, proximal RCA, and middle RCA in December 2014,  Essential hypertension,  Dyslipidemia.  Stable.  Reportedly, patient has not been taking his carvedilol 12.5 mg twice daily.  PTA metolazone was held on September 23.  Plan:  Continue aspirin 81 mg daily  Continue atorvastatin 40 mg daily  Continue diltiazem  mg daily, Imdur 30 mg daily, and lisinopril 20 mg daily  Continue furosemide 40 mg in the morning and 20 mg in the afternoon and spironolactone 25 mg every other day  Monitor blood pressure and cardiac status  Follow-up with cardiology as directed    Chronic HFpEF (LVEF 67% in January 2022),  Mild pulmonary hypertension,  Chronic lymphedema.  Patient currently weighs 247.2 LBS and has no significant peripheral edema.  PTA metolazone was held on September 23.  Plan:  Continue furosemide 40 mg in the morning and 20 mg in the afternoon and spironolactone 25 mg every other day  Monitor weight and volume status  Follow-up with cardiology as directed    PAF,  Sick sinus syndrome, s/p permanent pacemaker in March 2015.  Rate is controlled.  PTA warfarin: 5 mg every Sunday and Thursday, 7.5 mg all other days.  Last INR was 3.1 on September 26 and he is currently on warfarin 5 mg daily.  Plan:  Continue diltiazem  mg daily  Continue chronic anticoagulation with warfarin  Repeat INR on September 29 as ordered  Adjust warfarin dose for INR target of 2-3    History of PE  in September 2012 (post right knee surgery).  Negative venous Doppler for DVT at that time.  Plan:  Continue chronic anticoagulation with warfarin as above    COPD.  Appears stable.  Plan:  Continue Trelegy Ellipta 1 puff daily  Continue albuterol inhaler 2 puffs 4 times a day as needed  Monitor respiratory status    Hypothyroidism.   Last TSH was 1.01 on August 2, 2022.  Plan:  Continue levothyroxine 175 mcg daily    BPH.  Plan:  Continue tamsulosin 0.4 mg daily    Prediabetes.  Last hemoglobin A1c was 5.9% on August 2, 2022.  Plan:  Monitor blood glucose PRN  Follow-up with PCP    Chronic anemia.  Baseline hemoglobin around 12.  Last hemoglobin was higher at 13.1 on September 22, likely due to hemoconcentration.  Plan:  Monitor hemoglobin periodically    Anxiety,  Depression.  Patient reports marked anxiety.  Plan:  PRN clonazepam 0.5 mg twice daily for anxiety  Continue venlafaxine 37.5 mg twice daily    Severe obesity, BMI 35.5  RIN, on BiPAP.  Plan:  Continue nocturnal BiPAP per home settings    Physical deconditioning.  Plan:  Continue PT/OT evaluation and therapy        Orders written by provider at facility:  Clonazepam 0.5 mg p.o. twice daily ND anxiety, # 10 tablets        Recommendation by provider at facility:  Follow-up on CBC and BMP from today, September 27  Hold diuretic therapy and lisinopril if renal function worsens          Disclaimer: This note may contain text created using speech-recognition software and may contain unintended word substitutions.      Electronically signed by:  Gabe Recinos MD

## 2022-09-27 ENCOUNTER — TRANSFERRED RECORDS (OUTPATIENT)
Dept: HEALTH INFORMATION MANAGEMENT | Facility: CLINIC | Age: 82
End: 2022-09-27

## 2022-09-27 ENCOUNTER — TRANSITIONAL CARE UNIT VISIT (OUTPATIENT)
Dept: GERIATRICS | Facility: CLINIC | Age: 82
End: 2022-09-27
Payer: MEDICARE

## 2022-09-27 VITALS
BODY MASS INDEX: 35.36 KG/M2 | OXYGEN SATURATION: 95 % | HEIGHT: 70 IN | TEMPERATURE: 98.2 F | DIASTOLIC BLOOD PRESSURE: 66 MMHG | RESPIRATION RATE: 18 BRPM | HEART RATE: 98 BPM | SYSTOLIC BLOOD PRESSURE: 124 MMHG | WEIGHT: 247 LBS

## 2022-09-27 DIAGNOSIS — J44.9 CHRONIC OBSTRUCTIVE PULMONARY DISEASE, UNSPECIFIED COPD TYPE (H): ICD-10-CM

## 2022-09-27 DIAGNOSIS — E78.5 DYSLIPIDEMIA: ICD-10-CM

## 2022-09-27 DIAGNOSIS — N40.0 BENIGN PROSTATIC HYPERPLASIA, UNSPECIFIED WHETHER LOWER URINARY TRACT SYMPTOMS PRESENT: ICD-10-CM

## 2022-09-27 DIAGNOSIS — R73.03 PREDIABETES: ICD-10-CM

## 2022-09-27 DIAGNOSIS — R53.81 PHYSICAL DECONDITIONING: ICD-10-CM

## 2022-09-27 DIAGNOSIS — N17.9 ACUTE KIDNEY INJURY (H): ICD-10-CM

## 2022-09-27 DIAGNOSIS — F32.A DEPRESSION, UNSPECIFIED DEPRESSION TYPE: ICD-10-CM

## 2022-09-27 DIAGNOSIS — Z86.711 HISTORY OF PULMONARY EMBOLISM: ICD-10-CM

## 2022-09-27 DIAGNOSIS — F41.9 ANXIETY: ICD-10-CM

## 2022-09-27 DIAGNOSIS — S93.401D SPRAIN OF RIGHT ANKLE, UNSPECIFIED LIGAMENT, SUBSEQUENT ENCOUNTER: Primary | ICD-10-CM

## 2022-09-27 DIAGNOSIS — E66.01 CLASS 2 SEVERE OBESITY DUE TO EXCESS CALORIES WITH SERIOUS COMORBIDITY AND BODY MASS INDEX (BMI) OF 35.0 TO 35.9 IN ADULT (H): ICD-10-CM

## 2022-09-27 DIAGNOSIS — Z95.0 CARDIAC PACEMAKER IN SITU: ICD-10-CM

## 2022-09-27 DIAGNOSIS — E03.9 HYPOTHYROIDISM, UNSPECIFIED TYPE: ICD-10-CM

## 2022-09-27 DIAGNOSIS — I48.0 PAF (PAROXYSMAL ATRIAL FIBRILLATION) (H): ICD-10-CM

## 2022-09-27 DIAGNOSIS — E66.812 CLASS 2 SEVERE OBESITY DUE TO EXCESS CALORIES WITH SERIOUS COMORBIDITY AND BODY MASS INDEX (BMI) OF 35.0 TO 35.9 IN ADULT (H): ICD-10-CM

## 2022-09-27 DIAGNOSIS — G47.33 OSA (OBSTRUCTIVE SLEEP APNEA): ICD-10-CM

## 2022-09-27 DIAGNOSIS — I49.5 SSS (SICK SINUS SYNDROME) (H): ICD-10-CM

## 2022-09-27 DIAGNOSIS — N18.31 STAGE 3A CHRONIC KIDNEY DISEASE (H): ICD-10-CM

## 2022-09-27 DIAGNOSIS — D64.9 CHRONIC ANEMIA: ICD-10-CM

## 2022-09-27 DIAGNOSIS — I25.10 CORONARY ARTERY DISEASE INVOLVING NATIVE CORONARY ARTERY OF NATIVE HEART WITHOUT ANGINA PECTORIS: ICD-10-CM

## 2022-09-27 DIAGNOSIS — I10 ESSENTIAL HYPERTENSION: ICD-10-CM

## 2022-09-27 LAB
ANION GAP SERPL CALCULATED.3IONS-SCNC: 8 MMOL/L (ref 3–14)
BUN SERPL-MCNC: 41 MG/DL (ref 7–30)
CALCIUM SERPL-MCNC: 9 MG/DL (ref 8.5–10.1)
CHLORIDE BLD-SCNC: 107 MMOL/L (ref 94–109)
CO2 SERPL-SCNC: 23 MMOL/L (ref 20–32)
CREAT SERPL-MCNC: 0.97 MG/DL (ref 0.66–1.25)
ERYTHROCYTE [DISTWIDTH] IN BLOOD BY AUTOMATED COUNT: 16 % (ref 10–15)
GFR SERPL CREATININE-BSD FRML MDRD: 77 ML/MIN/1.73M2
GLUCOSE BLD-MCNC: 95 MG/DL (ref 70–99)
HCT VFR BLD AUTO: 40.3 % (ref 40–53)
HGB BLD-MCNC: 12.7 G/DL (ref 13.3–17.7)
MCH RBC QN AUTO: 29.7 PG (ref 26.5–33)
MCHC RBC AUTO-ENTMCNC: 31.5 G/DL (ref 31.5–36.5)
MCV RBC AUTO: 94 FL (ref 78–100)
PLATELET # BLD AUTO: 264 10E3/UL (ref 150–450)
POTASSIUM BLD-SCNC: 3.7 MMOL/L (ref 3.4–5.3)
RBC # BLD AUTO: 4.27 10E6/UL (ref 4.4–5.9)
SODIUM SERPL-SCNC: 138 MMOL/L (ref 133–144)
WBC # BLD AUTO: 10.4 10E3/UL (ref 4–11)

## 2022-09-27 PROCEDURE — 36415 COLL VENOUS BLD VENIPUNCTURE: CPT | Performed by: NURSE PRACTITIONER

## 2022-09-27 PROCEDURE — 85027 COMPLETE CBC AUTOMATED: CPT | Performed by: NURSE PRACTITIONER

## 2022-09-27 PROCEDURE — 99306 1ST NF CARE HIGH MDM 50: CPT | Performed by: INTERNAL MEDICINE

## 2022-09-27 PROCEDURE — 82374 ASSAY BLOOD CARBON DIOXIDE: CPT | Performed by: NURSE PRACTITIONER

## 2022-09-27 RX ORDER — CLONAZEPAM 0.5 MG/1
0.5 TABLET ORAL 2 TIMES DAILY PRN
COMMUNITY
End: 2022-09-29

## 2022-09-27 NOTE — LETTER
9/27/2022        RE: Erich Craven  [address]  [facility] [id number]        [facility]  INITIAL VISIT NOTE  September 27, 2022    PRIMARY CARE PROVIDER AND CLINIC:  Anne Heath [address] / [place] [address]    CHIEF COMPLAINT:  Hospital follow-up/Initial visit    HPI:    Erich Craven is a 82 year old  (1940) male who [facility], [facility] for an initial visit.     Medical history is notable for CAD, HFpEF, pulmonary hypertension, lymphedema, hypertension, dyslipidemia, PAF, sick sinus syndrome, s/p permanent pacemaker, PE, COPD, hypothyroidism, BPH, prediabetes, CKD, chronic anemia, anxiety, depression, shingles, osteoarthritis, severe obesity, and RIN.      Summary of hospital course:  Patient was hospitalized at [facility] from September 17 through September 19, 2022 for acute right ankle sprain and possible peroneus longus tendon tear or tenosynovitis. Injury sustained when he was stepping off a curb.  CT scan revealed fluid in the expected location of the distal peroneus longus tendon, multifocal osteoarthritis, diffuse muscle atrophy, and no evidence of fracture.  He was evaluated by orthopedic service and they recommended nonoperative treatment with CAM boot and possible WBAT with walker or cane.  TCU was recommended per therapies.    Patient is admitted to this facility for medical management, nursing care, and rehab.     Of note, history was obtained from patient, facility RN, and extensive review of the chart.    Today's visit:  Patient was seen in his room, while lying in bed.  The pain is his right ankle has markedly subsided.  He feels dry in his mouth.  He reports no fever, chills, chest pain, palpitation, dyspnea, nausea, vomiting, abdominal pain, or urinary symptoms.  He is able to urinate and urine in the urinal looks clear.  He had a bowel movement yesterday.  He states  that he has a lot of anxiety and he is requesting to have low-dose clonazepam as needed (he takes PRN lorazepam at home too).      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  Chronic anemia, baseline hemoglobin around 12  Diverticulosis of colon  Diverticulitis of colon in July 2022  Pulmonary nodules  Mumps  Probable left frontal meningioma  Shingles  Anxiety  Depression  Osteoarthritis  Severe obesity, BMI 35.5  RIN, on BiPAP      Past Medical History:   Diagnosis Date     (HFpEF) heart failure with preserved ejection fraction (H)      Arthritis      BPH with urinary obstruction      CAD (coronary artery disease)     PCI w/ SUMMER in 2014     Cardiac pacemaker in situ      Chronic diastolic heart failure (H)      Complication of anesthesia      COPD (chronic obstructive pulmonary disease) (H)      Diabetes (H)      DVT (deep venous thrombosis) (H)      Dyspnea      Essential hypertension      Fatigue      HLD (hyperlipidemia)      Hx of long term use of blood thinners     Due to PE. Started in 2012     Hypothyroidism      Lymphedema      Mumps      Obesity, Class III, BMI 40-49.9 (morbid obesity) (H)      RIN (obstructive sleep apnea)     has refused CPAP     Pacemaker      Paroxysmal A-fib (H)      PE (pulmonary thromboembolism) (H)        PAST SURGICAL HISTORY:   Past Surgical History:   Procedure Laterality Date     cardiac stenting       CV RIGHT HEART CATH MEASUREMENTS RECORDED N/A 11/5/2019    Procedure: Right Heart Cath;  Surgeon: Roberto Hernandez MD;  Location:  HEART CARDIAC CATH  LAB     ENT SURGERY      tonsillectomy     IMPLANT PACEMAKER       INJECT NERVE BLOCK SUPRASCAPULAR Bilateral 8/26/2021    Procedure: BLOCK, NERVE, SUPRASCAPULAR, Bilateral, under ultrasound guidance;  Surgeon: Swetha Allen MD;  Location: [facility] OR     INJECT NERVE BLOCK SUPRASCAPULAR Bilateral 9/23/2021    Procedure: bilateral suprascapular nerve block;  Surgeon: Swetha Allen MD;  Location: [facility] OR     INJECT NERVE BLOCK SUPRASCAPULAR Left 9/15/2022    Procedure: BLOCK, NERVE, SUPRASCAPULAR- left;  Surgeon: Swetha Allen MD;  Location: [facility] OR     NERVE BLOCK PERIPHERAL Bilateral 3/10/2022    Procedure: bilateral suprascapular nerve block;  Surgeon: Swetha Allen MD;  Location: [facility] OR     ORTHOPEDIC SURGERY Right 2012    knee replacement       FAMILY HISTORY:   Family History   Problem Relation Age of Onset     Hypertension Father        SOCIAL HISTORY:  Social History     Tobacco Use     Smoking status: Former Smoker     Packs/day: 0.50     Years: 25.00     Pack years: 12.50     Smokeless tobacco: Never Used     Tobacco comment: quit in 1989   Substance Use Topics     Alcohol use: 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; recovering       MEDICATIONS:  Current Outpatient Medications   Medication Sig Dispense Refill     acetaminophen (TYLENOL) 500 MG tablet Take 1,000 mg by mouth 3 times daily       Acetylcarnitine HCl (ACETYL L-CARNITINE PO) Take 1 capsule by mouth daily (Patient not taking: Reported on 9/21/2022)       albuterol (PROAIR HFA/PROVENTIL HFA/VENTOLIN HFA) 108 (90 Base) MCG/ACT inhaler Inhale 2 puffs into the lungs 4 times daily as needed for shortness of breath / dyspnea or wheezing       aspirin (ASA) 81 MG chewable tablet Take 1 tablet by mouth daily       atorvastatin (LIPITOR) 40 MG tablet TAKE 1 TABLET BY MOUTH EVERY EVENING 90 tablet 2     diltiazem ER COATED BEADS (CARDIZEM CD/CARTIA XT) 300 MG 24 hr capsule Take 1 capsule (300 mg) by mouth daily 90 capsule 0     Fluticasone-Umeclidin-Vilanterol  "(DAGOLEALVARADO ELLIPTA) 200-62.5-25 MCG/INH oral inhaler Inhale 1 puff into the lungs daily       furosemide (LASIX) 20 MG tablet Take 2 tabs in morning and 1 tab in the sfternoon 360 tablet 3     isosorbide mononitrate (IMDUR) 30 MG 24 hr tablet Take 1 tablet (30 mg) by mouth daily 90 tablet 0     levothyroxine (SYNTHROID/LEVOTHROID) 175 MCG tablet Take 1 tablet (175 mcg) by mouth daily 90 tablet 0     lisinopril (ZESTRIL) 20 MG tablet Take 1 tablet (20 mg) by mouth daily 90 tablet 3     metolazone (ZAROXOLYN) 2.5 MG tablet Take 1 tablet (2.5 mg) by mouth 2 times daily (Patient not taking: Reported on 9/24/2022) 180 tablet 0     Multiple vitamin TABS Take 1 tablet by mouth daily       polyethylene glycol (MIRALAX) 17 g packet Take 1 packet by mouth daily       spironolactone (ALDACTONE) 25 MG tablet Take 1 tablet (25 mg) by mouth every other day 45 tablet 2     tamsulosin (FLOMAX) 0.4 MG capsule Take 1 capsule (0.4 mg) by mouth daily 90 capsule 2     TAURINE PO Take 1 capsule by mouth daily (Patient not taking: Reported on 9/21/2022)       traMADol (ULTRAM) 50 MG tablet Take 0.5 tablets (25 mg) by mouth 2 times daily as needed for severe pain 10 tablet 0     venlafaxine (EFFEXOR) 37.5 MG tablet Take 1 tablet (37.5 mg) by mouth 2 times daily 180 tablet 0     warfarin ANTICOAGULANT (COUMADIN) 5 MG tablet Take by mouth 5 mg every Sunday & Thursday; 7.5 mg all other days of the week or as directed by the Anticoagulation Clinic (Patient taking differently: 6 mg daily Recheck INR on 9/26) 120 tablet 1         Post Medication Reconciliation Status:  Reconciled and changed; see notes/orders.    ALLERGIES:  Allergies   Allergen Reactions     No Known Allergies        ROS:  10 point ROS were negative other than the symptoms noted above in the HPI.    PHYSICAL EXAM:  Vital signs were reviewed in the chart.  Vital Signs: /66   Pulse 98   Temp 98.2  F (36.8  C)   Resp 18   Ht 1.778 m (5' 10\")   Wt 112 kg (247 lb)   SpO2 " 95%   BMI 35.44 kg/m    General: Comfortable and in no acute distress  HEENT: No conjunctival pallor, no scleral icterus or injection, moist oral mucosa  Cardiovascular: Normal S1, S2, RRR  Respiratory: Lungs clear to auscultation bilaterally  GI: Abdomen soft, non-tender, non-distended, +BS  Extremities: No significant LE edema  Neuro: CX II-XII grossly intact; ROM in all four extremities grossly intact  Psych: Alert and oriented x3; normal affect  Skin: No acute rash    LABORATORY/IMAGING DATA:  All relevant labs and imaging data in [facility] and/or Care Everywhere were personally reviewed today.    Hematology profile (September 22, 2022): White count 13.5, hemoglobin 13.1, platelet count 252,000, MCV 96    Chemistry profile (September 22, 2022): Sodium 134, potassium 3.4, BUN 54, creatinine 1.64, glucose 110, calcium 9    Most Recent 3 CBC's:Recent Labs   Lab Test 09/18/22  0611 09/17/22  1350 08/02/22  0829   WBC 12.3* 11.2* 10.1   HGB 11.0* 12.0* 12.6*   MCV 96 95 96    205 283     Most Recent 3 BMP's:Recent Labs   Lab Test 09/19/22  1206 09/19/22  0738 09/18/22  2302 09/18/22  0751 09/18/22  0611 09/17/22  1627 09/17/22  1350 08/02/22  0829   NA  --   --   --   --  139  --  138 136   POTASSIUM  --   --   --   --  4.1  --  3.9 3.6   CHLORIDE  --   --   --   --  106  --  103 97   CO2  --   --   --   --  27  --  30 31   BUN  --   --   --   --  29  --  42* 32*   CR  --   --   --   --  0.74  --  0.86 1.01   ANIONGAP  --   --   --   --  6  --  5 8   LAWSON  --   --   --   --  9.4  --  9.6 9.3   * 106* 123*   < > 119*   < > 102* 109*    < > = values in this interval not displayed.     Most Recent 2 LFT's:Recent Labs   Lab Test 08/02/22  0829 07/14/22  2327   AST 13 27   ALT 17 19   ALKPHOS 100 99   BILITOTAL 0.4 0.4     Most Recent TSH and T4:Recent Labs   Lab Test 08/02/22  0829   TSH 1.01         ASSESSMENT/PLAN:  Right ankle sprain, subsequent encounter.  Nonoperative management per Ortho.  Pain is  improving.  Plan:  Fall precautions  Continue right CAM boot  Continue pain management with scheduled acetaminophen and PRN tramadol  DVT prophylaxis with chronic warfarin  Follow-up with Ortho as directed    DONA,  CKD stage II-IIIa.  Baseline creatinine historically in the range of 1-1.5.  Creatinine was 0.86 on September 17.  Creatinine increased to 1.64 on September 22.  UA unremarkable.  Plan:  Avoid NSAIDs and nephrotoxic  Follow-up BMP from today, September 27  Hold diuretic therapy and lisinopril if renal function worsens    CAD, s/p SUMMER to proximal LAD, proximal RCA, and middle RCA in December 2014,  Essential hypertension,  Dyslipidemia.  Stable.  Reportedly, patient has not been taking his carvedilol 12.5 mg twice daily.  PTA metolazone was held on September 23.  Plan:  Continue aspirin 81 mg daily  Continue atorvastatin 40 mg daily  Continue diltiazem  mg daily, Imdur 30 mg daily, and lisinopril 20 mg daily  Continue furosemide 40 mg in the morning and 20 mg in the afternoon and spironolactone 25 mg every other day  Monitor blood pressure and cardiac status  Follow-up with cardiology as directed    Chronic HFpEF (LVEF 67% in January 2022),  Mild pulmonary hypertension,  Chronic lymphedema.  Patient currently weighs 247.2 LBS and has no significant peripheral edema.  PTA metolazone was held on September 23.  Plan:  Continue furosemide 40 mg in the morning and 20 mg in the afternoon and spironolactone 25 mg every other day  Monitor weight and volume status  Follow-up with cardiology as directed    PAF,  Sick sinus syndrome, s/p permanent pacemaker in March 2015.  Rate is controlled.  PTA warfarin: 5 mg every Sunday and Thursday, 7.5 mg all other days.  Last INR was 3.1 on September 26 and he is currently on warfarin 5 mg daily.  Plan:  Continue diltiazem  mg daily  Continue chronic anticoagulation with warfarin  Repeat INR on September 29 as ordered  Adjust warfarin dose for INR target of  2-3    History of PE in September 2012 (post right knee surgery).  Negative venous Doppler for DVT at that time.  Plan:  Continue chronic anticoagulation with warfarin as above    COPD.  Appears stable.  Plan:  Continue Trelegy Ellipta 1 puff daily  Continue albuterol inhaler 2 puffs 4 times a day as needed  Monitor respiratory status    Hypothyroidism.   Last TSH was 1.01 on August 2, 2022.  Plan:  Continue levothyroxine 175 mg daily    BPH.  Plan:  Continue tamsulosin 0.4 mg daily    Prediabetes.  Last hemoglobin A1c was 5.9% on August 2, 2022.  Plan:  Monitor blood glucose PRN  Follow-up with PCP    Chronic anemia.  Baseline hemoglobin around 12.  Last hemoglobin was higher at 13.1 on September 22, likely due to hemoconcentration.  Plan:  Monitor hemoglobin periodically    Anxiety,  Depression.  Patient reports marked anxiety.  Plan:  PRN clonazepam 0.5 mg twice daily for anxiety  Continue venlafaxine 37.5 mg twice daily    Severe obesity, BMI 35.5  RIN, on BiPAP.  Plan:  Continue nocturnal BiPAP per home settings    Physical deconditioning.  Plan:  Continue PT/OT evaluation and therapy        Orders written by provider at facility:  Clonazepam 0.5 mg p.o. twice daily SD anxiety, # 10 tablets        Recommendation by provider at facility:  Follow-up on CBC and BMP from today, September 27  Hold diuretic therapy and lisinopril if renal function worsens          Disclaimer: This note may contain text created using speech-recognition software and may contain unintended word substitutions.      Electronically signed by:  Gabe Recinos MD                          Sincerely,        Gabe Recinos MD

## 2022-09-28 ENCOUNTER — TRANSITIONAL CARE UNIT VISIT (OUTPATIENT)
Dept: GERIATRICS | Facility: CLINIC | Age: 82
End: 2022-09-28
Payer: MEDICARE

## 2022-09-28 VITALS
DIASTOLIC BLOOD PRESSURE: 84 MMHG | SYSTOLIC BLOOD PRESSURE: 149 MMHG | BODY MASS INDEX: 34.59 KG/M2 | OXYGEN SATURATION: 97 % | RESPIRATION RATE: 18 BRPM | HEART RATE: 76 BPM | WEIGHT: 241.6 LBS | HEIGHT: 70 IN | TEMPERATURE: 97.4 F

## 2022-09-28 DIAGNOSIS — J42 CHRONIC BRONCHITIS, UNSPECIFIED CHRONIC BRONCHITIS TYPE (H): Primary | ICD-10-CM

## 2022-09-28 DIAGNOSIS — N18.31 STAGE 3A CHRONIC KIDNEY DISEASE (H): ICD-10-CM

## 2022-09-28 DIAGNOSIS — I48.0 PAROXYSMAL A-FIB (H): ICD-10-CM

## 2022-09-28 DIAGNOSIS — S99.911A ANKLE INJURY, RIGHT, INITIAL ENCOUNTER: ICD-10-CM

## 2022-09-28 DIAGNOSIS — I50.32 CHRONIC DIASTOLIC HEART FAILURE (H): ICD-10-CM

## 2022-09-28 DIAGNOSIS — E66.01 MORBID OBESITY (H): ICD-10-CM

## 2022-09-28 PROCEDURE — 99309 SBSQ NF CARE MODERATE MDM 30: CPT | Performed by: NURSE PRACTITIONER

## 2022-09-28 NOTE — PROGRESS NOTES
"[facility]    Chief Complaint   Patient presents with     RECHECK     HPI:  Erich Craven is a 82 year old  (1940), who is being seen today for an episodic care visit at: [facility] (U) [[id number]].     This is a 83 yo male with PMHx chronic diastolic heart failure with preserved EF, lymphedema, PAF on Coumadin, COPD, CKD stage IIIa who presented eliciting pain when stepped off curb.  He was found to have an acute right ankle sprain, no fracture, given cam boot and Tylenol and tramadol for pain control.  Of note had a left shoulder injection on 9/15 so his Coumadin was held prior, restarted at discharge.  Per diastolic heart failure follows with [facility], follows PTA regiment other than not taking carvedilol.  No changes noted, discharged to [facility] for rehab.    Today's concern is:   CHF, anxiety, pain control, therapy progress    Allergies, and PMH/PSH reviewed in EPIC today.  REVIEW OF SYSTEMS:  4 point ROS including Respiratory, CV, GI and , other than that noted in the HPI,  is negative    Objective:   BP (!) 149/84   Pulse 76   Temp 97.4  F (36.3  C)   Resp 18   Ht 1.778 m (5' 10\")   Wt 109.6 kg (241 lb 9.6 oz)   SpO2 97%   BMI 34.67 kg/m    GENERAL APPEARANCE:  Alert, oriented, morbidly obese, cooperative, R foot pain controlled  RESP:  no respiratory distress, slight exp whz, room air  CV:  irregular rhythm Per rate controlled A. fib, no murmur, no edema, PPM  M/S:   Using CAM boot on R foot  PSYCH:  oriented X 3, memory impaired. SLUMS 24/30      Most Recent 3 CBC's:  Recent Labs   Lab Test 09/18/22  0611 09/17/22  1350 08/02/22  0829   WBC 12.3* 11.2* 10.1   HGB 11.0* 12.0* 12.6*   MCV 96 95 96    205 283     Most Recent 3 BMP's:  Recent Labs   Lab Test 09/19/22  1206 09/19/22  0738 09/18/22  2302 09/18/22  0751 09/18/22  0611 09/17/22  1627 09/17/22  1350 08/02/22  0829   NA  --   --   --   --  139  --  138 136   POTASSIUM  --   --   --   --  4.1  --  3.9 " 3.6   CHLORIDE  --   --   --   --  106  --  103 97   CO2  --   --   --   --  27  --  30 31   BUN  --   --   --   --  29  --  42* 32*   CR  --   --   --   --  0.74  --  0.86 1.01   ANIONGAP  --   --   --   --  6  --  5 8   LAWSON  --   --   --   --  9.4  --  9.6 9.3   * 106* 123*   < > 119*   < > 102* 109*    < > = values in this interval not displayed.       Assessment/Plan:    Right ankle sprain  Given weightbearing as tolerated in cam boot, continue therapy. F/u with ortho on 10/5 per Dr Santillan     Pain  Using Tylenol 1000 mg 3 times daily, continue tramadol 25 mg twice daily as needed (1-2x/day). Denies pain     (I48.0) Paroxysmal A-fib (H)  (Z79.01) Long term current use of anticoagulants with INR goal of 2.0-3.0  Last INR 3.1, decreased Coumadin 5 mg daily, recheck INR on 9/29     (I50.32) Chronic diastolic heart failure (H)  (I50.33) Acute on chronic heart failure with preserved ejection fraction (H)  (I10) Essential hypertension  PAF  PTA carvedilol not taking, continue diltiazem 300 mg daily, furosemide 40 mg in a.m. and 20 mg in afternoon (hold for SBP <130), isosorbide 30 mg daily, metolazone 2.5 mg twice daily (on hold per hypotension/JEYSON) and spironolactone 25 mg every other day.  daily weights. 243lb (stable). Using TG shapes. No change     (I25.119) Coronary artery disease involving native coronary artery with angina pectoris, unspecified whether native or transplanted heart (H)  Continue aspirin 81 mg daily and statin     (G47.33) RIN (obstructive sleep apnea)   Continue CPAP at bedtime     (J42) Chronic bronchitis, unspecified chronic bronchitis type (H)  COPD  Continue Trelegy elliptica 200/62.5/25 1 puff daily, room air. No change     (E66.01) Morbid obesity (H)  Last BMI 34.7     (N18.31) Stage 3a chronic kidney disease (H)  Last creatinine 0.97 with GFR >70 on 9/27 (improved off metolazone)     Leukocytosis  Last WBC 10.4 on 9/27 (resolved)     Normocytic normochromic anemia  Last hemoglobin  13.1 on 9/22, recheck CBC on 9/27     Hypothyroidism  Continue levothyroxine 175 MCG daily, last TSH 1.01 on 8/2/2022     Constipation  Using MiraLAX daily, adequate     BPH  Continue Flomax 0.4 mg daily, no change     Depression  Continue Effexor 37.5 mg twice daily, stable    Anxiety  Recently started on clonzepam 0.5mg BID PRN (1 dose used). Stable     Therapy  Ambulating >400ft in CAM boot with FWW, CGA    Orders:  NA    Electronically signed by: Erich Meade NP

## 2022-09-29 VITALS
TEMPERATURE: 97.8 F | OXYGEN SATURATION: 97 % | WEIGHT: 249.6 LBS | SYSTOLIC BLOOD PRESSURE: 96 MMHG | BODY MASS INDEX: 35.73 KG/M2 | HEART RATE: 93 BPM | HEIGHT: 70 IN | RESPIRATION RATE: 16 BRPM | DIASTOLIC BLOOD PRESSURE: 60 MMHG

## 2022-09-29 DIAGNOSIS — F41.9 ANXIETY: Primary | ICD-10-CM

## 2022-09-29 RX ORDER — CLONAZEPAM 0.5 MG/1
0.5 TABLET ORAL 2 TIMES DAILY PRN
Qty: 30 TABLET | Refills: 1 | Status: SHIPPED | OUTPATIENT
Start: 2022-09-29 | End: 2022-10-04

## 2022-09-30 ENCOUNTER — TRANSITIONAL CARE UNIT VISIT (OUTPATIENT)
Dept: GERIATRICS | Facility: CLINIC | Age: 82
End: 2022-09-30
Payer: MEDICARE

## 2022-09-30 DIAGNOSIS — E11.9 TYPE 2 DIABETES MELLITUS WITHOUT COMPLICATION, WITHOUT LONG-TERM CURRENT USE OF INSULIN (H): ICD-10-CM

## 2022-09-30 DIAGNOSIS — I50.32 CHRONIC DIASTOLIC HEART FAILURE (H): ICD-10-CM

## 2022-09-30 DIAGNOSIS — I10 ESSENTIAL HYPERTENSION: ICD-10-CM

## 2022-09-30 DIAGNOSIS — Z79.01 LONG TERM CURRENT USE OF ANTICOAGULANTS WITH INR GOAL OF 2.0-3.0: ICD-10-CM

## 2022-09-30 DIAGNOSIS — G47.33 OSA (OBSTRUCTIVE SLEEP APNEA): Primary | ICD-10-CM

## 2022-09-30 DIAGNOSIS — E66.01 MORBID OBESITY (H): ICD-10-CM

## 2022-09-30 PROCEDURE — 99309 SBSQ NF CARE MODERATE MDM 30: CPT | Performed by: NURSE PRACTITIONER

## 2022-09-30 NOTE — PROGRESS NOTES
"[facility] GERIATRICS    Chief Complaint   Patient presents with     RECHECK     HPI:  Erich Craven is a 82 year old  (1940), who is being seen today for an episodic care visit at: [facility] (U) [[id number]].     This is a 81 yo male with PMHx chronic diastolic heart failure with preserved EF, lymphedema, PAF on Coumadin, COPD, CKD stage IIIa who presented eliciting pain when stepped off curb.  He was found to have an acute right ankle sprain, no fracture, given cam boot and Tylenol and tramadol for pain control.  Of note had a left shoulder injection on 9/15 so his Coumadin was held prior, restarted at discharge.  Per diastolic heart failure follows with [facility], follows PTA regiment other than not taking carvedilol.  No changes noted, discharged to [facility] for rehab.    Today's concern is:   CHF, anxiety, therapy progress    Allergies, and PMH/PSH reviewed in EPIC today.  REVIEW OF SYSTEMS:  4 point ROS including Respiratory, CV, GI and , other than that noted in the HPI,  is negative    Objective:   BP 96/60   Pulse 93   Temp 97.8  F (36.6  C)   Resp 16   Ht 1.778 m (5' 10\")   Wt 113.2 kg (249 lb 9.6 oz)   SpO2 97%   BMI 35.81 kg/m    GENERAL APPEARANCE:  Alert, oriented, morbidly obese, cooperative, R foot pain controlled  RESP:  no respiratory distress, slight exp whz, room air  CV:  irregular rhythm Per rate controlled A. fib, no murmur, no edema, PPM  M/S:   Using CAM boot on R foot  PSYCH:  oriented X 3, memory impaired. SLUMS 24/30      Most Recent 3 CBC's:  Recent Labs   Lab Test 09/18/22  0611 09/17/22  1350 08/02/22  0829   WBC 12.3* 11.2* 10.1   HGB 11.0* 12.0* 12.6*   MCV 96 95 96    205 283     Most Recent 3 BMP's:  Recent Labs   Lab Test 09/19/22  1206 09/19/22  0738 09/18/22  2302 09/18/22  0751 09/18/22  0611 09/17/22  1627 09/17/22  1350 08/02/22  0829   NA  --   --   --   --  139  --  138 136   POTASSIUM  --   --   --   --  4.1  --  3.9 3.6   CHLORIDE  -- "   --   --   --  106  --  103 97   CO2  --   --   --   --  27  --  30 31   BUN  --   --   --   --  29  --  42* 32*   CR  --   --   --   --  0.74  --  0.86 1.01   ANIONGAP  --   --   --   --  6  --  5 8   LAWSON  --   --   --   --  9.4  --  9.6 9.3   * 106* 123*   < > 119*   < > 102* 109*    < > = values in this interval not displayed.       Assessment/Plan:    Right ankle sprain  Given weightbearing as tolerated in cam boot, continue therapy. F/u with ortho on 10/5 per Dr Santillan     Pain  Using Tylenol 1000 mg 3 times daily, continue tramadol 25 mg twice daily as needed (1-2x/day). Denies pain     (I48.0) Paroxysmal A-fib (H)  (Z79.01) Long term current use of anticoagulants with INR goal of 2.0-3.0  Last INR 2.8, change coumadin to 6mg Th/Sat and 5mg AOD, recheck INR on 10/3     (I50.32) Chronic diastolic heart failure (H)  (I50.33) Acute on chronic heart failure with preserved ejection fraction (H)  (I10) Essential hypertension  PAF  PTA carvedilol not taking, continue diltiazem 300 mg daily, furosemide 40 mg in a.m. and 20 mg in afternoon (hold for SBP <110), today will hold isosorbide 30 mg daily, metolazone 2.5 mg twice daily (continue to hold per hypotension/JEYSON) and spironolactone 25 mg every other day.  daily weights. 250lb (+3lb). Using TG shapes. No change     (I25.119) Coronary artery disease involving native coronary artery with angina pectoris, unspecified whether native or transplanted heart (H)  Continue aspirin 81 mg daily and statin     (G47.33) RIN (obstructive sleep apnea)   Continue CPAP at bedtime, no change     (J42) Chronic bronchitis, unspecified chronic bronchitis type (H)  COPD  Continue Trelegy elliptica 200/62.5/25 1 puff daily, room air. No change     (E66.01) Morbid obesity (H)  Last BMI 35.8     (N18.31) Stage 3a chronic kidney disease (H)  Last creatinine 0.97 with GFR >70 on 9/27 (improved off metolazone)     Leukocytosis  Last WBC 10.4 on 9/27 (resolved)     Normocytic  normochromic anemia  Last hemoglobin 13.1 on 9/22, recheck CBC on 9/27     Hypothyroidism  Continue levothyroxine 175 MCG daily, last TSH 1.01 on 8/2/2022     Constipation  Using MiraLAX daily, adequate     BPH  Continue Flomax 0.4 mg daily, no change     Depression  Continue Effexor 37.5 mg twice daily, stable     Anxiety  Recently started on clonzepam 0.5mg BID PRN (1 dose used). Stable. Plan to discontinue at discharge     Therapy  Ambulating >400ft in CAM boot with FWW, CGA    Orders:  Change hold parameters for lasix    Electronically signed by: Erich Meade NP

## 2022-10-03 VITALS
DIASTOLIC BLOOD PRESSURE: 67 MMHG | WEIGHT: 249.4 LBS | OXYGEN SATURATION: 90 % | TEMPERATURE: 97.5 F | RESPIRATION RATE: 18 BRPM | SYSTOLIC BLOOD PRESSURE: 101 MMHG | BODY MASS INDEX: 35.79 KG/M2 | HEART RATE: 90 BPM

## 2022-10-03 DIAGNOSIS — G89.29 CHRONIC LEFT SHOULDER PAIN: ICD-10-CM

## 2022-10-03 DIAGNOSIS — M25.512 CHRONIC LEFT SHOULDER PAIN: ICD-10-CM

## 2022-10-03 RX ORDER — TRAMADOL HYDROCHLORIDE 50 MG/1
25 TABLET ORAL 2 TIMES DAILY PRN
Qty: 10 TABLET | Refills: 0 | Status: SHIPPED | OUTPATIENT
Start: 2022-10-03 | End: 2022-10-11

## 2022-10-04 ENCOUNTER — DISCHARGE SUMMARY NURSING HOME (OUTPATIENT)
Dept: GERIATRICS | Facility: CLINIC | Age: 82
End: 2022-10-04
Payer: MEDICARE

## 2022-10-04 DIAGNOSIS — I50.32 CHRONIC DIASTOLIC HEART FAILURE (H): ICD-10-CM

## 2022-10-04 DIAGNOSIS — I89.0 LYMPHEDEMA: ICD-10-CM

## 2022-10-04 DIAGNOSIS — F41.9 ANXIETY: ICD-10-CM

## 2022-10-04 DIAGNOSIS — E78.5 HYPERLIPIDEMIA LDL GOAL <100: ICD-10-CM

## 2022-10-04 DIAGNOSIS — E03.9 HYPOTHYROIDISM, UNSPECIFIED TYPE: ICD-10-CM

## 2022-10-04 DIAGNOSIS — I10 ESSENTIAL HYPERTENSION: ICD-10-CM

## 2022-10-04 DIAGNOSIS — I48.91 ATRIAL FIBRILLATION, UNSPECIFIED TYPE (H): ICD-10-CM

## 2022-10-04 PROCEDURE — 99316 NF DSCHRG MGMT 30 MIN+: CPT | Performed by: NURSE PRACTITIONER

## 2022-10-04 RX ORDER — LEVOTHYROXINE SODIUM 175 UG/1
175 TABLET ORAL DAILY
Qty: 30 TABLET | Refills: 0 | Status: SHIPPED | OUTPATIENT
Start: 2022-10-04 | End: 2022-11-11

## 2022-10-04 RX ORDER — WARFARIN SODIUM 5 MG/1
TABLET ORAL
Qty: 10 TABLET | Refills: 0 | Status: SHIPPED | OUTPATIENT
Start: 2022-10-04 | End: 2022-10-10

## 2022-10-04 RX ORDER — DILTIAZEM HYDROCHLORIDE 300 MG/1
300 CAPSULE, COATED, EXTENDED RELEASE ORAL DAILY
Qty: 30 CAPSULE | Refills: 0 | Status: SHIPPED | OUTPATIENT
Start: 2022-10-04 | End: 2022-11-15

## 2022-10-04 RX ORDER — SPIRONOLACTONE 25 MG/1
25 TABLET ORAL EVERY OTHER DAY
Qty: 20 TABLET | Refills: 0 | Status: SHIPPED | OUTPATIENT
Start: 2022-10-04 | End: 2022-12-07

## 2022-10-04 RX ORDER — ATORVASTATIN CALCIUM 40 MG/1
40 TABLET, FILM COATED ORAL EVERY EVENING
Qty: 30 TABLET | Refills: 0 | Status: SHIPPED | OUTPATIENT
Start: 2022-10-04 | End: 2022-11-08

## 2022-10-04 RX ORDER — TAMSULOSIN HYDROCHLORIDE 0.4 MG/1
0.4 CAPSULE ORAL DAILY
Qty: 30 CAPSULE | Refills: 0 | Status: SHIPPED | OUTPATIENT
Start: 2022-10-04 | End: 2023-04-20

## 2022-10-04 RX ORDER — VENLAFAXINE 37.5 MG/1
37.5 TABLET ORAL 2 TIMES DAILY
Qty: 60 TABLET | Refills: 0 | Status: SHIPPED | OUTPATIENT
Start: 2022-10-04 | End: 2022-10-27 | Stop reason: DRUGHIGH

## 2022-10-04 RX ORDER — FUROSEMIDE 40 MG
40 TABLET ORAL EVERY MORNING
Qty: 30 TABLET | Refills: 0 | Status: SHIPPED | OUTPATIENT
Start: 2022-10-04 | End: 2022-10-18

## 2022-10-04 RX ORDER — FUROSEMIDE 20 MG
20 TABLET ORAL EVERY EVENING
Qty: 30 TABLET | Refills: 0 | Status: SHIPPED | OUTPATIENT
Start: 2022-10-04 | End: 2023-04-19

## 2022-10-04 RX ORDER — LISINOPRIL 20 MG/1
20 TABLET ORAL DAILY
Qty: 30 TABLET | Refills: 0 | Status: SHIPPED | OUTPATIENT
Start: 2022-10-04 | End: 2022-10-13

## 2022-10-04 NOTE — PROGRESS NOTES
[facility] GERIATRICS DISCHARGE SUMMARY  PATIENT'S NAME: Erich Craven  YOB: 1940  MEDICAL RECORD NUMBER:  [id number]  Place of Service where encounter took place:  [facility] (TCU) [[id number]]    PRIMARY CARE PROVIDER AND CLINIC RESPONSIBLE AFTER TRANSFER:   Anne Heath MD, [address] / [place] [address]    G Provider     Transferring providers: Erich Meade NP, MD Jorje  Recent Hospitalization/ED:  [facility] stay 9/17/22 to 9/19/22.  Date of SNF Admission: September 19, 2022  Date of SNF (anticipated) Discharge: October 04, 2022  Discharged to: previous independent home  Cognitive Scores: SLUMS: 24/30  Physical Function: Ambulating >400 ft with FWW  DME: No DME needed    CODE STATUS/ADVANCE DIRECTIVES DISCUSSION:  No CPR- Do NOT Intubate   ALLERGIES: No known allergies    HPI  This is a 83 yo male with PMHx chronic diastolic heart failure with preserved EF, lymphedema, PAF on Coumadin, COPD, CKD stage IIIa who presented eliciting pain when stepped off curb.  He was found to have an acute right ankle sprain, no fracture, given cam boot and Tylenol and tramadol for pain control.  Of note had a left shoulder injection on 9/15 so his Coumadin was held prior, restarted at discharge.  Per diastolic heart failure follows with [facility], follows PTA regiment other than not taking carvedilol.  No changes noted, discharged to [facility] for rehab.    Summary of nursing facility stay:     Right ankle sprain  Given weightbearing as tolerated in cam boot, continue therapy. F/u with ortho on 10/5 per Dr Santillan     Pain  Using Tylenol 1000 mg 3 times daily, continue tramadol 25 mg twice daily as needed (1-2x/day). Denies pain     (I48.0) Paroxysmal A-fib (H)  (Z79.01) Long term current use of anticoagulants with INR goal of 2.0-3.0  Last INR 2.8, change coumadin to 6mg Th/Sat and 5mg AOD, recheck INR on 10/3     (I50.32) Chronic diastolic  heart failure (H)  (I50.33) Acute on chronic heart failure with preserved ejection fraction (H)  (I10) Essential hypertension  PAF  PTA carvedilol not taking, continue diltiazem 300 mg daily, furosemide 40 mg in a.m. and 20 mg in the afternoon, isosorbide 30 mg daily (holding per hypotension), metolazone 2.5 mg twice daily (continue to hold per hypotension/JEYSON) and spironolactone 25 mg every other day.  daily weights. 249lb (+2lb). Using TG shapes. No change. PCP to address     (I25.119) Coronary artery disease involving native coronary artery with angina pectoris, unspecified whether native or transplanted heart (H)  Continue aspirin 81 mg daily and statin     (G47.33) RIN (obstructive sleep apnea)   Continue CPAP at bedtime, no change     (J42) Chronic bronchitis, unspecified chronic bronchitis type (H)  COPD  Continue Trelegy elliptica 200/62.5/25 1 puff daily, room air. No change     (E66.01) Morbid obesity (H)  Last BMI 35.8     (N18.31) Stage 3a chronic kidney disease (H)  Last creatinine 0.97 with GFR >70 on 9/27 (improved off metolazone)     Leukocytosis  Last WBC 10.4 on 9/27 (resolved)     Normocytic normochromic anemia  Last hemoglobin 12.7 on 9/27     Hypothyroidism  Continue levothyroxine 175 MCG daily, last TSH 1.01 on 8/2/2022     Constipation  Using MiraLAX daily     BPH  Continue Flomax 0.4 mg daily     Depression  Continue Effexor 37.5 mg twice daily     Anxiety  Recently started on clonzepam 0.5mg BID PRN (1 dose used). Discontinued at discharge    Discharge Medications:    Current Outpatient Medications   Medication Sig Dispense Refill     acetaminophen (TYLENOL) 500 MG tablet Take 1,000 mg by mouth 3 times daily       Acetylcarnitine HCl (ACETYL L-CARNITINE PO) Take 1 capsule by mouth daily (Patient not taking: Reported on 9/21/2022)       albuterol (PROAIR HFA/PROVENTIL HFA/VENTOLIN HFA) 108 (90 Base) MCG/ACT inhaler Inhale 2 puffs into the lungs 4 times daily as needed for shortness of breath  / dyspnea or wheezing       aspirin (ASA) 81 MG chewable tablet Take 1 tablet by mouth daily       atorvastatin (LIPITOR) 40 MG tablet TAKE 1 TABLET BY MOUTH EVERY EVENING 90 tablet 2     diltiazem ER COATED BEADS (CARDIZEM CD/CARTIA XT) 300 MG 24 hr capsule Take 1 capsule (300 mg) by mouth daily 90 capsule 0     Fluticasone-Umeclidin-Vilanterol (TRELEGY ELLIPTA) 200-62.5-25 MCG/INH oral inhaler Inhale 1 puff into the lungs daily       furosemide (LASIX) 20 MG tablet Take 20 mg by mouth every evening       furosemide (LASIX) 40 MG tablet Take 40 mg by mouth every morning       isosorbide mononitrate (IMDUR) 30 MG 24 hr tablet Take 1 tablet (30 mg) by mouth daily (Patient not taking: Reported on 9/30/2022) 90 tablet 0     levothyroxine (SYNTHROID/LEVOTHROID) 175 MCG tablet Take 1 tablet (175 mcg) by mouth daily 90 tablet 0     lisinopril (ZESTRIL) 20 MG tablet Take 1 tablet (20 mg) by mouth daily 90 tablet 3     metolazone (ZAROXOLYN) 2.5 MG tablet Take 1 tablet (2.5 mg) by mouth 2 times daily (Patient not taking: Reported on 9/24/2022) 180 tablet 0     Multiple vitamin TABS Take 1 tablet by mouth daily       polyethylene glycol (MIRALAX) 17 g packet Take 1 packet by mouth daily       spironolactone (ALDACTONE) 25 MG tablet Take 1 tablet (25 mg) by mouth every other day 45 tablet 2     tamsulosin (FLOMAX) 0.4 MG capsule Take 1 capsule (0.4 mg) by mouth daily 90 capsule 2     TAURINE PO Take 1 capsule by mouth daily (Patient not taking: Reported on 9/21/2022)       traMADol (ULTRAM) 50 MG tablet Take 0.5 tablets (25 mg) by mouth 2 times daily as needed for severe pain 10 tablet 0     venlafaxine (EFFEXOR) 37.5 MG tablet Take 1 tablet (37.5 mg) by mouth 2 times daily 180 tablet 0     warfarin ANTICOAGULANT (COUMADIN) 5 MG tablet Take by mouth 5 mg every Sunday & Thursday; 7.5 mg all other days of the week or as directed by the Anticoagulation Clinic (Patient taking differently: daily Give 6mg M/W/Fr and 5mg  AOD  Recheck INR on 10/10) 120 tablet 1     Controlled medications:   Medication: tramadol 25mg , 10 tabs given to patient at the time of discharge to take home     Past Medical History:   Past Medical History:   Diagnosis Date     (HFpEF) heart failure with preserved ejection fraction (H)      Arthritis      BPH with urinary obstruction      CAD (coronary artery disease)     PCI w/ SUMMER in 2014     Cardiac pacemaker in situ      Chronic diastolic heart failure (H)      Complication of anesthesia      COPD (chronic obstructive pulmonary disease) (H)      Diabetes (H)      DVT (deep venous thrombosis) (H)      Dyspnea      Essential hypertension      Fatigue      HLD (hyperlipidemia)      Hx of long term use of blood thinners     Due to PE. Started in 2012     Hypothyroidism      Lymphedema      Mumps      Obesity, Class III, BMI 40-49.9 (morbid obesity) (H)      RIN (obstructive sleep apnea)     has refused CPAP     Pacemaker      Paroxysmal A-fib (H)      PE (pulmonary thromboembolism) (H)      Physical Exam:   Vitals: /67   Pulse 90   Temp 97.5  F (36.4  C)   Resp 18   Wt 113.1 kg (249 lb 6.4 oz)   SpO2 90%   BMI 35.79 kg/m    BMI: Body mass index is 35.79 kg/m .  GENERAL APPEARANCE:  Alert, oriented, morbidly obese, cooperative, R foot pain controlled  RESP:  no respiratory distress, slight exp whz, room air  CV:  irregular rhythm per rate controlled A. fib, no murmur, no edema, PPM  M/S:   Using CAM boot on R foot  PSYCH:  oriented X 3, memory impaired. SLUMS 24/30    SNF labs: see above    DISCHARGE PLAN:    Follow up labs: INR due 10/10 to be drawn by home care with results to PCP    Medical Follow Up:      Follow up with primary care provider in 1-2 weeks    [facility] scheduled appointments:       Discharge Services: Home Care:  Occupational Therapy, Physical Therapy, Registered Nurse and Home Health Aide    Discharge Instructions Verbalized to Patient at Discharge:     None    TOTAL  DISCHARGE TIME:   Greater than 30 minutes  Electronically signed by:  Erich Meade NP     Documentation of Face to Face and Certification for Home Health Services    I certify that patient: Erich Craven is under my care and that I, or a nurse practitioner or physician's assistant working with me, had a face-to-face encounter that meets the physician face-to-face encounter requirements with this patient on: 10/4/2022.    This encounter with the patient was in whole, or in part, for the following medical condition, which is the primary reason for home health care: medication management and blood draw.    I certify that, based on my findings, the following services are medically necessary home health services: Nursing, Occupational Therapy and Physical Therapy.    My clinical findings support the need for the above services because: Nurse is needed: To provide assessment and oversight required in the home to assure adherence to the medical plan due to: medication management and blood draw.., Occupational Therapy Services are needed to assess and treat cognitive ability and address ADL safety due to impairment in upper body strength. and Physical Therapy Services are needed to assess and treat the following functional impairments: falls risk.    Further, I certify that my clinical findings support that this patient is homebound (i.e. absences from home require considerable and taxing effort and are for medical reasons or Hindu services or infrequently or of short duration when for other reasons) because: returning home.    Based on the above findings. I certify that this patient is confined to the home and needs intermittent skilled nursing care, physical therapy and/or speech therapy.  The patient is under my care, and I have initiated the establishment of the plan of care.  This patient will be followed by a physician who will periodically review the plan of care.  Physician/Provider to provide follow up  care: Anne Heath    Attending hospital physician (the Medicare certified PECOS provider): Erich Meade NP  Physician Signature: See electronic signature associated with these discharge orders.  Date: 10/4/2022

## 2022-10-06 ENCOUNTER — TELEPHONE (OUTPATIENT)
Dept: FAMILY MEDICINE | Facility: CLINIC | Age: 82
End: 2022-10-06

## 2022-10-06 ENCOUNTER — TELEPHONE (OUTPATIENT)
Dept: ANESTHESIOLOGY | Facility: CLINIC | Age: 82
End: 2022-10-06

## 2022-10-06 NOTE — TELEPHONE ENCOUNTER
Reason for Call:  call back    Detailed comments: Pt was recently hospitalized for a broken ankle and would like to speak to his Dr. about this. Also, he's talking his regular dosage of medication but has since lost 20 lbs and is lightheaded because his blood pressure is low. He says his medication dose might have to be changed. The first available appt is in November and wants to know if he can be seen sooner than that.    Phone Number Patient can be reached at: Home number on file [phone] (home)    Best Time:  Anytime    Can we leave a detailed message on this number? YES    Call taken on 10/6/2022 at 5:40 PM by Brielle Jerome

## 2022-10-06 NOTE — TELEPHONE ENCOUNTER
Order/Referral Request    Who is requesting: James from Occupational Therapy    Orders being requested: continue OT one time per week for five weeks for ADL retraining, strengthening, balance, and home safety.    Reason service is needed/diagnosis: right ankle sprain from a fall    When are orders needed by: asap    Has this been discussed with Provider: unknown    Does patient have a preference on a Group/Provider/Facility? [facility]    Does patient have an appointment scheduled?: No    Where to send orders: verbal    Could we send this information to you in [facility] or would you prefer to receive a phone call?:   Patient would prefer a phone call   Okay to leave a detailed message?: Yes at Other phone number:  James from Occupational Therapy [phone]*

## 2022-10-06 NOTE — TELEPHONE ENCOUNTER
Called patient to schedule  follow up in the Pain Clinic with Dr Allen per provider last visit on 08/25/22 checkout comment dispositions - Follow up 2 weeks after procedure. No answered. Left message with clinic number for appointment scheduling.     Julia Stafford, Clinic , 10/06/22 at 10:22 AM

## 2022-10-07 ENCOUNTER — ANTICOAGULATION THERAPY VISIT (OUTPATIENT)
Dept: ANTICOAGULATION | Facility: CLINIC | Age: 82
End: 2022-10-07

## 2022-10-07 ENCOUNTER — TELEPHONE (OUTPATIENT)
Dept: FAMILY MEDICINE | Facility: CLINIC | Age: 82
End: 2022-10-07

## 2022-10-07 ENCOUNTER — PATIENT OUTREACH (OUTPATIENT)
Dept: CARE COORDINATION | Facility: CLINIC | Age: 82
End: 2022-10-07

## 2022-10-07 DIAGNOSIS — G89.29 CHRONIC LEFT SHOULDER PAIN: Primary | ICD-10-CM

## 2022-10-07 DIAGNOSIS — I26.99 PE (PULMONARY THROMBOEMBOLISM) (H): Primary | ICD-10-CM

## 2022-10-07 DIAGNOSIS — Z79.01 LONG TERM CURRENT USE OF ANTICOAGULANTS WITH INR GOAL OF 2.0-3.0: ICD-10-CM

## 2022-10-07 DIAGNOSIS — M25.512 CHRONIC LEFT SHOULDER PAIN: Primary | ICD-10-CM

## 2022-10-07 DIAGNOSIS — I48.0 PAROXYSMAL A-FIB (H): ICD-10-CM

## 2022-10-07 DIAGNOSIS — I82.4Z9 DEEP VEIN THROMBOSIS (DVT) OF DISTAL VEIN OF LOWER EXTREMITY, UNSPECIFIED CHRONICITY, UNSPECIFIED LATERALITY (H): ICD-10-CM

## 2022-10-07 LAB
INR (EXTERNAL): 1.3 (ref 0.9–1.1)
INR (EXTERNAL): 2.3 (ref 0.9–1.1)
INR (EXTERNAL): 2.8 (ref 0.9–1.1)
INR (EXTERNAL): 3.1 (ref 0.9–1.1)
INR (EXTERNAL): 3.7 (ref 0.9–1.1)

## 2022-10-07 NOTE — TELEPHONE ENCOUNTER
The Home Care/Assisted Living/Nursing Facility is calling regarding an established patient.  Has the patient seen Home Care in the past or is currently residing in Assisted Living or Nursing Facility? No.     Nazario calling from [facility] requesting the following orders that are NOT within the Home Care, Assisted Living or Nursing Home Eval and Treatment standing order and must be ordered by a Licensed Practitioner.    Preferred Call Back Number: [phone]    SN and HHA     SN 1 X 5 weeks  HHA 1 X 5 weeks    Routing to Licensed Practitioner (Provider) to review request and provide approval or recommendation.    Writer has verified Requestor will send fax to have orders signed.    FYI: patient had a fall this morning. Patient denies hitting his head or loosing consciousness. Patient sustained a small skin tear on his right hand from the fall. No other injures or pain reported.    Carmencita Hood RN  [facility] Triage Team

## 2022-10-07 NOTE — PROGRESS NOTES
ANTICOAGULATION MANAGEMENT     Erich Craven 82 year old male is on warfarin with supratherapeutic INR result. (Goal INR 2.0-3.0)    Recent labs: (last 7 days)     10/07/22  1104   INR 3.7*       ASSESSMENT       Source(s): Chart Review and Home Care/Facility Nurse       Warfarin doses taken: More warfarin taken than planned which may be affecting INR, discharged on 5 mg sun/sat/thur and 6 mg all other days but patient took his previous maintenance dose of 5 mg MWF and 7.5 mg all other days    Diet: patient is continuing the why protein once daily, no change.    New illness, injury, or hospitalization: No    Medication/supplement changes: None noted    Signs or symptoms of bleeding or clotting: No    Previous INR: Therapeutic last visit; previously outside of goal range    Additional findings: None       PLAN     Recommended plan for temporary change(s) affecting INR     Dosing Instructions: hold dose then Increase your warfarin dose (11.1% change) with next INR in 4 days       Summary  As of 10/7/2022    Full warfarin instructions:  5 mg every Mon, Wed, Fri; 7.5 mg all other days   Next INR check:                   Telephone call with [facility] home care nurse who verbalizes understanding and agrees to plan    Orders given to  Homecare nurse/facility to recheck    Education provided: Please call back if any changes to your diet, medications or how you've been taking warfarin    Plan made per [facility] anticoagulation protocol    Katelyn Stoner RN  Anticoagulation Clinic  10/7/2022    _______________________________________________________________________     Anticoagulation Episode Summary     Current INR goal:  2.0-3.0   TTR:  50.8 % (1 y)   Target end date:  Indefinite   Send INR reminders to:  SERENITY PINEDA    Indications    PE (pulmonary thromboembolism) (H) [I26.99]  Paroxysmal A-fib (H) [I48.0]  Deep vein thrombosis (DVT) of distal vein of lower extremity  unspecified chronicity  unspecified laterality (H)  [I82.4Z9]  Long term current use of anticoagulants with INR goal of 2.0-3.0 [Z79.01]           Comments:           Anticoagulation Care Providers     Provider Role Specialty Phone number    Dontrell Gómez MD Referring Internal Medicine [phone]    Anne Heath MD Referring Internal Medicine [phone]              Last INR 2.8, change coumadin to 6mg Th/Sat and 5mg AOD, recheck INR on 10/3  9/26/22-Last INR 3.1, decreased Coumadin 5 mg daily, recheck INR on 9/29 9/23-Last INR 2.3, continue Coumadin 6 mg daily, recheck INR on 9/26 9/17-Last INR 1.3, continue Coumadin 7 mg daily, recheck INR on 9/23

## 2022-10-07 NOTE — TELEPHONE ENCOUNTER
The Home Care/Assisted Living/Nursing Facility is calling regarding an established patient.  Has the patient seen Home Care in the past or is currently residing in Assisted Living or Nursing Facility? No.     Rosemarie PT calling from [facility] requesting the following orders that are NOT within the Home Care, Assisted Living or Nursing Home Eval and Treatment standing order and must be ordered by a Licensed Practitioner.    Preferred Call Back Number: [phone]    PT/OT/Speech Therapy     PT 1 X 5 weeks    Routing to Licensed Practitioner (Provider) to review request and provide approval or recommendation.    Writer has verified Requestor will send fax to have orders signed.    Carmencita Hood RN  [facility] Triage Team

## 2022-10-07 NOTE — PROGRESS NOTES
Clinic Care Coordination Contact  [facility]/Voicemail       Clinical Data: Care Coordinator Outreach    Outreach attempted x 1.  Left message on patient's voicemail with call back information and requested return call.    Plan: Care Coordinator will try to reach patient again in 1-2 business days.    Ksenia Farnsworth [facility]  Clinic Care Coordinator  [facility]  [phone]  [email or web address]

## 2022-10-07 NOTE — TELEPHONE ENCOUNTER
Patient Returning Call    Reason for call:  Patient was speaking to an INR nurse     Information relayed to patient:      Patient has additional questions:  Yes    What are your questions/concerns:  Patient needs dosing     Could we send this information to you in [facility] or would you prefer to receive a phone call?:   Patient would prefer a phone call   Okay to leave a detailed message?: Yes at Other phone number:  [phone] RN Nazario and RN working with the patient

## 2022-10-07 NOTE — PROGRESS NOTES
Clinic Care Coordination Contact  [facility]: Post-Discharge Note  SITUATION                                                      Admission:    Admission Date: 09/17/22   Reason for Admission: Hurt ankle stepping off a curb  Discharge:   Discharge Date: 09/19/22  Discharge Diagnosis: Acute right ankle sprain    BACKGROUND                                                      Per hospital discharge summary and inpatient provider notes:    Erich Craven is a 82 year old male with complex PMHx including hypothyroidism, RIN, COPD, CKD3, HTN, DM2, CAD, chronic HFpEF, pAF, and DVT / PE who presented with difficulty ambulating d/t right ankle pain after eversion STEVEN of right ankle while stepping off a curb.     Acute right ankle sprain.  No fracture / dislocation on ankle XR.  - Appreciate orthopedic consult.  - Obtain CT right fool look for occult fracture.  - Ortho boot in place  - PT evaluation recommended TCU  - Pain control with ray APAP and PRN Tramadol avail  - Last BM AM 9/19.     Hx DVT / PE  Chronic anticoagulation with Coumadin, goal INR 2-3  Had left shoulder injection 9/15 and held his Coumadin 5 days prior to that, restarted evening 9/15 as directed.  [PTA: M / W / F 5mg, all other days 7.5mg]  - Pharm consult for warfarin dosing     Paroxysmal atrial fibrillation.  - Cont diltiazem and pharm to dose warfarin.     CAD (no hx PCI)   HTN   Chronic diastolic heart failure with HFpEF  Follows with [facility]. Weight remained stable, no in acute exacerbation.  [PTA: ASA 81mg, atorvastatin 40mg QHS, carvedilol 12.5mg BID (not taking), diltiazem er 300mg, furosemide 40mg QAM and 20mg in evening, Imdur 30mg, lisinopril 20mg, metolazone 2.5mg BID, spironolactone 25mg QOD]  - Daily weights, I&Os, low-sodium diet.   - Cont PTA regimen with exception beta blocker as is reportedly not taking per med rec.     Chronic lymphedema  Left lower extremity recent vein procedure with outside vascular surgeon.  - Cont PTA  diuretics as above  - Recommend OP follow up with PCP and Vascular surgery    ASSESSMENT           Discharge Assessment  How are you doing now that you are home?: Doing well. Working with Home Care. He is out of the boot and now is walking with an ankle brace  How are your symptoms? (Red Flag symptoms escalate to triage hotline per guidelines): Improved  Do you feel your condition is stable enough to be safe at home until your provider visit?: Yes  Does the patient have their discharge instructions? : Yes  Does the patient have questions regarding their discharge instructions? : No  Were you started on any new medications or were there changes to any of your previous medications? : Yes  Does the patient have all of their medications?: Yes  Do you have questions regarding any of your medications? : Yes (see comment) (Pt would like to confirm med changes were correct and discuss BP)  Do you have all of your needed medical supplies or equipment (DME)?  (i.e. oxygen tank, CPAP, cane, etc.): Yes  Discharge follow-up appointment scheduled within 14 calendar days? : No  Is patient agreeable to assistance with scheduling? : Yes         Post-op (Clinicians Only)  Did the patient have surgery or a procedure: No  Fever: No  Chills: No  Eating & Drinking: eating and drinking without complaints/concerns      PLAN                                                      Outpatient Plan:      Future Appointments   Date Time Provider Department Center   10/27/2022  8:00 AM Kassi Glez, PharmD [facility] CS   11/16/2022  2:30 PM LORI LYLES [facility] CL[facility]   11/16/2022  3:45 PM Brent Portillo MD [facility] PSA CLIN   1/6/2023  8:00 AM [facility]   1/6/2023  9:00 AM Tiffany Suero NP [facility]         For any urgent concerns, please contact our 24 hour nurse triage line: [phone] ([phone])       BECKIE Dacosta  Clinic Care Coordinator  [facility]  [facility]  [phone]  [email or web address]

## 2022-10-07 NOTE — LETTER
[facility] CARE COORDINATION  [address] [place] [address]    October 7, 2022    Erich Craven  [address]  [place] [address]      Dear Erich,        I am a clinic care coordinator who works with Anne Heath MD with the [facility]. I wanted to thank you for spending the time to talk with me.  Below is a description of clinic care coordination and how I can further assist you.       The clinic care coordination team is made up of a registered nurse, , financial resource worker and community health worker who understand the health care system. The goal of clinic care coordination is to help you manage your health and improve access to the health care system. Our team works alongside your provider to assist you in determining your health and social needs. We can help you obtain health care and community resources, providing you with necessary information and education. We can work with you through any barriers and develop a care plan that helps coordinate and strengthen the communication between you and your care team.    Please feel free to contact me with any questions or concerns regarding care coordination and what we can offer.      We are focused on providing you with the highest-quality healthcare experience possible.    Sincerely,     Ksenia Farnsworth, [facility]  Clinic Care Coordinator  [facility] [place]  [phone]

## 2022-10-10 ENCOUNTER — ANTICOAGULATION THERAPY VISIT (OUTPATIENT)
Dept: ANTICOAGULATION | Facility: CLINIC | Age: 82
End: 2022-10-10

## 2022-10-10 DIAGNOSIS — I82.4Z9 DEEP VEIN THROMBOSIS (DVT) OF DISTAL VEIN OF LOWER EXTREMITY, UNSPECIFIED CHRONICITY, UNSPECIFIED LATERALITY (H): ICD-10-CM

## 2022-10-10 DIAGNOSIS — I48.0 PAROXYSMAL A-FIB (H): ICD-10-CM

## 2022-10-10 DIAGNOSIS — Z79.01 LONG TERM CURRENT USE OF ANTICOAGULANTS WITH INR GOAL OF 2.0-3.0: ICD-10-CM

## 2022-10-10 DIAGNOSIS — I26.99 PE (PULMONARY THROMBOEMBOLISM) (H): Primary | ICD-10-CM

## 2022-10-10 LAB — INR (EXTERNAL): 1.9 (ref 0.9–1.1)

## 2022-10-10 NOTE — PROGRESS NOTES
ANTICOAGULATION MANAGEMENT     Erich Craven 82 year old male is on warfarin with subtherapeutic INR result. (Goal INR 2.0-3.0)    Recent labs: (last 7 days)     10/10/22  1232   INR 1.9*       ASSESSMENT       Source(s): Chart Review and Home Care/Facility Nurse       Warfarin doses taken: Missed dose(s) may be affecting INR. Pt misunderstood dosing and skipped Sunday's dose.    Diet: No new diet changes identified    New illness, injury, or hospitalization: yes- TCU 09/19-10/04 for acute R ankle sprain.     Medication/supplement changes: Tylenol ~3,000mg/day. This is not new. Was taking this in TCU    Signs or symptoms of bleeding or clotting: No    Previous INR: Supratherapeutic    Additional findings: None       PLAN     Recommended plan for temporary change(s) affecting INR     Dosing Instructions: booster dose then continue your current warfarin dose with next INR in 4 days       Summary  As of 10/10/2022    Full warfarin instructions:  10/10: 7.5 mg; Otherwise 7.5 mg every Tue, Thu; 5 mg all other days   Next INR check:  10/14/2022             Telephone call with [facility] home care nurse who verbalizes understanding and agrees to plan    Orders given to  Homecare nurse/facility to recheck    Education provided: Goal range and significance of current result    Plan made per [facility] anticoagulation protocol    Raphael Wright RN  Anticoagulation Clinic  10/10/2022    _______________________________________________________________________     Anticoagulation Episode Summary     Current INR goal:  2.0-3.0   TTR:  51.3 % (1 y)   Target end date:  Indefinite   Send INR reminders to:  SERENITY PINEDA    Indications    PE (pulmonary thromboembolism) (H) [I26.99]  Paroxysmal A-fib (H) [I48.0]  Deep vein thrombosis (DVT) of distal vein of lower extremity  unspecified chronicity  unspecified laterality (H) [I82.4Z9]  Long term current use of anticoagulants with INR goal of 2.0-3.0 [Z79.01]           Comments:            Anticoagulation Care Providers     Provider Role Specialty Phone number    Dontrell Gómez MD Referring Internal Medicine [phone]    Anne Heath MD Referring Internal Medicine [phone]

## 2022-10-10 NOTE — TELEPHONE ENCOUNTER
James called to follow up on the OT orders. Relayed to James that we have not heard back from the provider regarding the OT orders and will reroute to provider high priority.    Carmencita Hood RN  [facility] Triage Team

## 2022-10-11 ENCOUNTER — MEDICAL CORRESPONDENCE (OUTPATIENT)
Dept: HEALTH INFORMATION MANAGEMENT | Facility: CLINIC | Age: 82
End: 2022-10-11

## 2022-10-11 ENCOUNTER — TELEPHONE (OUTPATIENT)
Dept: ANESTHESIOLOGY | Facility: CLINIC | Age: 82
End: 2022-10-11

## 2022-10-11 ENCOUNTER — NURSE TRIAGE (OUTPATIENT)
Dept: FAMILY MEDICINE | Facility: CLINIC | Age: 82
End: 2022-10-11

## 2022-10-11 ENCOUNTER — MYC MEDICAL ADVICE (OUTPATIENT)
Dept: ANESTHESIOLOGY | Facility: CLINIC | Age: 82
End: 2022-10-11

## 2022-10-11 DIAGNOSIS — M25.512 CHRONIC LEFT SHOULDER PAIN: ICD-10-CM

## 2022-10-11 DIAGNOSIS — G89.29 CHRONIC LEFT SHOULDER PAIN: ICD-10-CM

## 2022-10-11 RX ORDER — TRAMADOL HYDROCHLORIDE 50 MG/1
25 TABLET ORAL 2 TIMES DAILY PRN
Qty: 10 TABLET | Refills: 0 | Status: SHIPPED | OUTPATIENT
Start: 2022-10-11 | End: 2022-11-04

## 2022-10-11 NOTE — TELEPHONE ENCOUNTER
Writer called patient to follow up on below. Patient clarified that he has not had any dizziness since yesterday.    Patient had OT session today and /70 and P 78 (per hypertension protocol - patient to be seen in office within 2 weeks)     Patient states prior to about a month ago, his blood pressures were typically stable around 120-130/70-80. Has noticed a pattern lately of blood pressures being lower than baseline     Patient currently denies any symptoms. Denies headache, chest pain, blurred vision, difficulty breathing, weakness. Denies any dizziness currently.     Per hypotension protocol, patient to be seen in office within 3 days. Writer scheduled patient for next available in clinic appointment:  10/13/2022 8:30 AM (Arrive by 8:10 AM) Dasha Garcia PA-C [facility]     Routing to provider to verify if patient ok to wait until appointment on 10/13 as patient is currently asymptomatic?    Writer advised patient seek ER/UC in the interim with any new or worsening symptoms. Reviewed red flag symptoms with patient. Patient expressed verbal understanding and is agreeable.     Callback [phone] - ok to leave detailed VM     Peace Keith RN  [facility]      Reason for Disposition    Brief dizziness or lightheadedness after standing up or eating    Additional Information    Negative: Systolic BP < 90 and feeling dizzy, lightheaded, or weak    Negative: Started suddenly after an allergic medicine, an allergic food, or bee sting    Negative: Shock suspected (e.g., cold/pale/clammy skin, too weak to stand, low BP, rapid pulse)    Negative: Difficult to awaken or acting confused (e.g., disoriented, slurred speech)    Negative: Fainted    Negative: Chest pain    Negative: Bleeding (e.g., vomiting blood, rectal bleeding or tarry stools, severe vaginal bleeding)    Negative: Extra heart beats or heart is beating fast (i.e., 'palpitations')    Negative: Sounds like a  life-threatening emergency to the triager    Negative: Systolic BP < 80 and NOT dizzy, lightheaded or weak (feels normal)    Negative: Abdominal pain    Negative: Major surgery in the past month    Negative: Fever > 100.4 F (38.0 C)    Negative: Drinking very little and has signs of dehydration (e.g., no urine > 12 hours, very dry mouth, very lightheaded)    Negative: Fall in systolic BP > 20 mm Hg from normal and feeling dizzy, lightheaded, or weak    Negative: Patient sounds very sick or weak to the triager    Negative: Systolic BP < 90 and NOT dizzy, lightheaded or weak    Negative: Systolic BP  while taking blood pressure medications and NOT dizzy, lightheaded or weak    Negative: Fall in systolic BP > 20 mm Hg from normal and NOT dizzy, lightheaded, or weak    Negative: Fall in systolic BP > 20 mmHg after standing up    Negative: Fall in systolic BP > 20 mmHg after eating a meal    Negative: Diastolic BP < 50 mm Hg    Protocols used: BLOOD PRESSURE - LOW-A-OH

## 2022-10-11 NOTE — TELEPHONE ENCOUNTER
RN called and spoke with patient to discuss pre-op instructions were sent to him via Moni. RN advised a 5 day hold of Coumadin prior to procedure on 10/20 with Dr. Allen, pending approval from Anticoagulation Clinic. Patient verbalized understanding. Patient also requesting a refill of his Tramadol, states he has about 4-5 tablets left. RX pended for review and approval.       Linda Serrano RN

## 2022-10-11 NOTE — TELEPHONE ENCOUNTER
Refill request    Medication: traMADol (ULTRAM) 50 MG tablet     Sig: Route: Take 0.5 tablets (25 mg) by mouth 2 times daily as needed for severe pain    Dispensed: 30 tablets  Refills: 0  Sold to the pt on 9/6/22    Note: Pt was given a total of #23 additional tablets of Tramadol while they were in a Transitiional care unit.     9/19/22- 10 tablets.   10/2/22- 3 tablets.  10/3/22- 10 tablets.    Last clinic appointment: 8/25/22  Next clinic appointment: Not scheduled    Last Drug Screen Collected: 9/20/21  Controlled Substance Agreement signed: 9/20/21      Preferred pharmacy:      [id number] - [place], [place] - [address]      Linda Serrano RN      Addendum: LPN added in  information. Encounter was routed to Dr. Allen to review.      Shani Shelton LPN

## 2022-10-11 NOTE — TELEPHONE ENCOUNTER
Call to James OT    Attempt #1    Was call answered?  No.  Left message on voicemail with information to call triage back. (VM instructed only brief messages - no confidential VM listed)     On callback - review provider's approval for orders requested below     Peace Keith RN  [facility]

## 2022-10-11 NOTE — TELEPHONE ENCOUNTER
"Nurse Triage SBAR    Is this a 2nd Level Triage? YES, LICENSED PRACTITIONER REVIEW IS REQUIRED    Situation:   pt reporting new onset of dizziness starting about a month ago. Pt states that dizziness only occurs when he changes position from sitting to standing.   Pt reporting his last blood pressure was 102/60, HR ~70. He has also had recent weight loss (about 25 pounds), reporting some of it intentional to qualify for Inspire vs CPAP.   Pt is reporting lukasz diet and fluids.   Background:   pt first noticed dizziness after being discharged from the hospital 9/19/22 following a fall, right ankle sprain. He is wondering if it is related to his medications, BP, recent weight loss? He mentioned not taking amlodipine while he was at the hospital, but reports he thinks that was a mistake and he actually never stopped taking amlodipine. He reports he has been taking this medication since he has discharged from the TCU.     Assessment:   please see notes    Protocol Recommended Disposition:   Discuss With PCP And Callback By Nurse Today: pt agrees. PCP would you like pt to be scheduled for OV?    Recommendation:   answer callback from clinic     Routed to provider    Does the patient meet one of the following criteria for ADS visit consideration? 16+ years old, with an [facility] PCP     TIP  Providers, please consider if this condition is appropriate for management at one of our Acute and Diagnostic Services sites.     If patient is a good candidate, please use dotphrase <dot>triageresponse and select Refer to ADS to document.      1. DESCRIPTION: \"Describe your dizziness.\"      Stands up too fast with walker, he waits a minute until he is oriented. Fear of falling. Worse in the p.m.  2. LIGHTHEADED: \"Do you feel lightheaded?\" (e.g., somewhat faint, woozy, weak upon standing)      \"Yes, that describes it\"  3. VERTIGO: \"Do you feel like either you or the room is spinning or tilting?\" (i.e. vertigo)      Denies, reporting feeling " "unsteadiness  4. SEVERITY: \"How bad is it?\"  \"Do you feel like you are going to faint?\" \"Can you stand and walk?\"    - MILD: Feels slightly dizzy, but walking normally.    - MODERATE: Feels unsteady when walking, but not falling; interferes with normal activities (e.g., school, work).    - SEVERE: Unable to walk without falling, or requires assistance to walk without falling; feels like passing out now.       Moderate,stands for a minute before walking to get bearings, sometimes has to sit back down. Pt using walker d/t recent fall from missing a step when walking off of curb. ED/TCU admit 9/17.   5. ONSET:  \"When did the dizziness begin?\"      Going from sitting to standing. First noticed after ED visit and TCU. Dc'd from TCU a week ago today,10/4/22.   6. AGGRAVATING FACTORS: \"Does anything make it worse?\" (e.g., standing, change in head position)      Going from sitting to standing  7. HEART RATE: \"Can you tell me your heart rate?\" \"How many beats in 15 seconds?\"  (Note: not all patients can do this)        Pt reports usually HR runs in 70's  8. CAUSE: \"What do you think is causing the dizziness?\"   pt unsure, maybe possible med changes during ED/TCU visit  9. RECURRENT SYMPTOM: \"Have you had dizziness before?\" If Yes, ask: \"When was the last time?\" \"What happened that time?\"      Pt reporting hasnt happened before  10. OTHER SYMPTOMS: \"Do you have any other symptoms?\" (e.g., fever, chest pain, vomiting, diarrhea, bleeding)        Denies.   Pt reporting INR 1.7 (goal: 2-3) recheck on Friday 10/14/22.  11. PREGNANCY: \"Is there any chance you are pregnant?\" \"When was your last menstrual period?\"           Reason for Disposition    Taking a medicine that could cause dizziness (e.g., blood pressure medications, diuretics)    Additional Information    Negative: SEVERE difficulty breathing (e.g., struggling for each breath, speaks in single words)    Negative: Shock suspected (e.g., cold/pale/clammy skin, too weak to " stand, low BP, rapid pulse)    Negative: Difficult to awaken or acting confused (e.g., disoriented, slurred speech)    Negative: Fainted, and still feels dizzy afterwards    Negative: Overdose (accidental or intentional) of medications    Negative: New neurologic deficit that is present now: * Weakness of the face, arm, or leg on one side of the body * Numbness of the face, arm, or leg on one side of the body * Loss of speech or garbled speech    Negative: Heart beating < 50 beats per minute OR > 140 beats per minute    Negative: Sounds like a life-threatening emergency to the triager    Negative: Chest pain    Negative: Rectal bleeding, bloody stool, or tarry-black stool    Negative: Vomiting is main symptom    Negative: Diarrhea is main symptom    Negative: Headache is main symptom    Negative: Heat exhaustion suspected (i.e., dehydration from heat exposure)    Negative: Patient states that they are having an anxiety or panic attack    Negative: Dizziness from low blood sugar (i.e., < 60 mg/dl or 3.5 mmol/l)    Negative: SEVERE dizziness (e.g., unable to stand, requires support to walk, feels like passing out now)    Negative: SEVERE headache or neck pain    Negative: Spinning or tilting sensation (vertigo) present now and one or more stroke risk factors (i.e., hypertension, diabetes mellitus, prior stroke/TIA, heart attack, age over 60) (Exception: prior physician evaluation for this AND no different/worse than usual)    Negative: Neurologic deficit that was brief (now gone), ANY of the following:* Weakness of the face, arm, or leg on one side of the body* Numbness of the face, arm, or leg on one side of the body* Loss of speech or garbled speech    Negative: Loss of vision or double vision  (Exception: Similar to previous migraines.)    Negative: Extra heart beats OR irregular heart beating (i.e., 'palpitations')    Negative: Difficulty breathing    Negative: Drinking very little and has signs of dehydration  (e.g., no urine > 12 hours, very dry mouth, very lightheaded)    Negative: Follows bleeding (e.g., stomach, rectum, vagina)  (Exception: Became dizzy from sight of small amount blood.)    Negative: Patient sounds very sick or weak to the triager    Negative: Lightheadedness (dizziness) present now, after 2 hours of rest and fluids    Negative: Spinning or tilting sensation (vertigo) present now    Negative: Fever > 103 F (39.4 C)    Negative: Fever > 100.0 F (37.8 C) and has diabetes mellitus or a weak immune system (e.g., HIV positive, cancer chemotherapy, organ transplant, splenectomy, chronic steroids)    Negative: MODERATE dizziness (e.g., interferes with normal activities) (Exception: dizziness caused by heat exposure, sudden standing, or poor fluid intake)    Negative: Vomiting occurs with dizziness    Negative: Patient wants to be seen    Protocols used: DIZZINESS-A-OH

## 2022-10-11 NOTE — TELEPHONE ENCOUNTER
RN called [facility] scheduling line, was transferred and on hold for 15 minutes and unable to speak with anyone. RN calling to request a 5 day hold of Warfarin before upcoming procedure on 10/20 with Dr. Allen. RN will attempt call back.       Linda Serrano RN

## 2022-10-11 NOTE — TELEPHONE ENCOUNTER
Patient Contact    Attempt # 1    Was call answered?  No.  Left message on voicemail with information to call triage back.    On callback - review provider's recommendation below     Peace Keith RN  [facility]

## 2022-10-11 NOTE — TELEPHONE ENCOUNTER
Reason for Disposition    Taking a medicine that could cause dizziness (e.g., blood pressure medications, diuretics)    Additional Information    Negative: SEVERE difficulty breathing (e.g., struggling for each breath, speaks in single words)    Negative: Shock suspected (e.g., cold/pale/clammy skin, too weak to stand, low BP, rapid pulse)    Negative: Difficult to awaken or acting confused (e.g., disoriented, slurred speech)    Negative: Fainted, and still feels dizzy afterwards    Negative: Overdose (accidental or intentional) of medications    Negative: New neurologic deficit that is present now: * Weakness of the face, arm, or leg on one side of the body * Numbness of the face, arm, or leg on one side of the body * Loss of speech or garbled speech    Negative: Heart beating < 50 beats per minute OR > 140 beats per minute    Negative: Sounds like a life-threatening emergency to the triager    Negative: Chest pain    Negative: Rectal bleeding, bloody stool, or tarry-black stool    Negative: Vomiting is main symptom    Negative: Diarrhea is main symptom    Negative: Headache is main symptom    Negative: Heat exhaustion suspected (i.e., dehydration from heat exposure)    Negative: Patient states that they are having an anxiety or panic attack    Negative: Dizziness from low blood sugar (i.e., < 60 mg/dl or 3.5 mmol/l)    Negative: SEVERE dizziness (e.g., unable to stand, requires support to walk, feels like passing out now)    Negative: SEVERE headache or neck pain    Negative: Spinning or tilting sensation (vertigo) present now and one or more stroke risk factors (i.e., hypertension, diabetes mellitus, prior stroke/TIA, heart attack, age over 60) (Exception: prior physician evaluation for this AND no different/worse than usual)    Negative: Neurologic deficit that was brief (now gone), ANY of the following:* Weakness of the face, arm, or leg on one side of the body* Numbness of the face, arm, or leg on one side  of the body* Loss of speech or garbled speech    Negative: Loss of vision or double vision  (Exception: Similar to previous migraines.)    Negative: Extra heart beats OR irregular heart beating (i.e., 'palpitations')    Negative: Difficulty breathing    Negative: Drinking very little and has signs of dehydration (e.g., no urine > 12 hours, very dry mouth, very lightheaded)    Negative: Follows bleeding (e.g., stomach, rectum, vagina)  (Exception: Became dizzy from sight of small amount blood.)    Negative: Patient sounds very sick or weak to the triager    Negative: Lightheadedness (dizziness) present now, after 2 hours of rest and fluids    Negative: Spinning or tilting sensation (vertigo) present now    Negative: Fever > 103 F (39.4 C)    Negative: Fever > 100.0 F (37.8 C) and has diabetes mellitus or a weak immune system (e.g., HIV positive, cancer chemotherapy, organ transplant, splenectomy, chronic steroids)    Negative: MODERATE dizziness (e.g., interferes with normal activities) (Exception: dizziness caused by heat exposure, sudden standing, or poor fluid intake)    Negative: Vomiting occurs with dizziness    Negative: Patient wants to be seen    Protocols used: DIZZINESS-A-OH

## 2022-10-12 ENCOUNTER — DOCUMENTATION ONLY (OUTPATIENT)
Dept: CARDIOLOGY | Facility: CLINIC | Age: 82
End: 2022-10-12

## 2022-10-12 NOTE — TELEPHONE ENCOUNTER
See 08/29/2022 TE with procedure plan previously approved, patient cleared by PCP for up to 7 day hold if needed.       RECOMMENDATION       Pre-Procedure:  o Hold warfarin for 5 days, until after procedure starting: 10/15/2022   o No Bridge      Post-Procedure:  o Resume warfarin dose if okay with provider doing procedure on night of procedure, 10/20/2022 PM: 10mg  o Recheck INR ~ 7 days after resuming warfarin     ?   Sherin Santillan [facility]

## 2022-10-12 NOTE — PROGRESS NOTES
Pt sent transmission because HR was at 100bpm. No episodes recorded. Leads are stable. About 2.5 years remaining. He was recently in transitional care due to a sprained ankle. While he was at the TCU he was not doing much activity and little appetite. He has lost about 25lbs in the last 20 days. Since we didn't see anything on PPM transmission encouraged pt to go to ED if symptoms persist. ELIZABETH Cuevas

## 2022-10-12 NOTE — TELEPHONE ENCOUNTER
Writer called James OT and Left detailed voicemail for approval of OT one time per week for five weeks . Also to call  ([phone]) if there are any questions.     Georgiana Jennings RN  [facility]

## 2022-10-13 ENCOUNTER — TELEPHONE (OUTPATIENT)
Dept: FAMILY MEDICINE | Facility: CLINIC | Age: 82
End: 2022-10-13

## 2022-10-13 ENCOUNTER — OFFICE VISIT (OUTPATIENT)
Dept: FAMILY MEDICINE | Facility: CLINIC | Age: 82
End: 2022-10-13
Payer: MEDICARE

## 2022-10-13 VITALS
SYSTOLIC BLOOD PRESSURE: 113 MMHG | HEART RATE: 92 BPM | HEIGHT: 70 IN | RESPIRATION RATE: 22 BRPM | BODY MASS INDEX: 34.5 KG/M2 | TEMPERATURE: 96.9 F | WEIGHT: 241 LBS | DIASTOLIC BLOOD PRESSURE: 75 MMHG | OXYGEN SATURATION: 94 %

## 2022-10-13 DIAGNOSIS — E87.6 HYPOKALEMIA: ICD-10-CM

## 2022-10-13 DIAGNOSIS — I10 ESSENTIAL HYPERTENSION: ICD-10-CM

## 2022-10-13 DIAGNOSIS — E03.9 HYPOTHYROIDISM, UNSPECIFIED TYPE: ICD-10-CM

## 2022-10-13 DIAGNOSIS — Z95.0 CARDIAC PACEMAKER IN SITU: ICD-10-CM

## 2022-10-13 DIAGNOSIS — Z79.01 LONG TERM CURRENT USE OF ANTICOAGULANTS WITH INR GOAL OF 2.0-3.0: ICD-10-CM

## 2022-10-13 DIAGNOSIS — R42 LIGHTHEADEDNESS: Primary | ICD-10-CM

## 2022-10-13 DIAGNOSIS — I48.91 ATRIAL FIBRILLATION, UNSPECIFIED TYPE (H): ICD-10-CM

## 2022-10-13 DIAGNOSIS — R00.2 PALPITATIONS: ICD-10-CM

## 2022-10-13 LAB
ANION GAP SERPL CALCULATED.3IONS-SCNC: 8 MMOL/L (ref 3–14)
BUN SERPL-MCNC: 43 MG/DL (ref 7–30)
CALCIUM SERPL-MCNC: 9.5 MG/DL (ref 8.5–10.1)
CHLORIDE BLD-SCNC: 99 MMOL/L (ref 94–109)
CO2 SERPL-SCNC: 31 MMOL/L (ref 20–32)
CREAT SERPL-MCNC: 1.13 MG/DL (ref 0.66–1.25)
GFR SERPL CREATININE-BSD FRML MDRD: 64 ML/MIN/1.73M2
GLUCOSE BLD-MCNC: 110 MG/DL (ref 70–99)
HGB BLD-MCNC: 13.6 G/DL (ref 13.3–17.7)
POTASSIUM BLD-SCNC: 3.1 MMOL/L (ref 3.4–5.3)
SODIUM SERPL-SCNC: 138 MMOL/L (ref 133–144)
TSH SERPL DL<=0.005 MIU/L-ACNC: 3.07 MU/L (ref 0.4–4)

## 2022-10-13 PROCEDURE — 36415 COLL VENOUS BLD VENIPUNCTURE: CPT | Performed by: PHYSICIAN ASSISTANT

## 2022-10-13 PROCEDURE — 80048 BASIC METABOLIC PNL TOTAL CA: CPT | Performed by: PHYSICIAN ASSISTANT

## 2022-10-13 PROCEDURE — 84443 ASSAY THYROID STIM HORMONE: CPT | Performed by: PHYSICIAN ASSISTANT

## 2022-10-13 PROCEDURE — 85018 HEMOGLOBIN: CPT | Performed by: PHYSICIAN ASSISTANT

## 2022-10-13 PROCEDURE — 99214 OFFICE O/P EST MOD 30 MIN: CPT | Performed by: PHYSICIAN ASSISTANT

## 2022-10-13 RX ORDER — POTASSIUM CHLORIDE 750 MG/1
10 TABLET, EXTENDED RELEASE ORAL DAILY
Qty: 30 TABLET | Refills: 0 | Status: SHIPPED | OUTPATIENT
Start: 2022-10-13 | End: 2022-10-18

## 2022-10-13 RX ORDER — LISINOPRIL 20 MG/1
10 TABLET ORAL DAILY
Qty: 30 TABLET | Refills: 0 | COMMUNITY
Start: 2022-10-13 | End: 2022-11-21

## 2022-10-13 ASSESSMENT — PAIN SCALES - GENERAL: PAINLEVEL: MODERATE PAIN (5)

## 2022-10-13 NOTE — PROGRESS NOTES
"HPI: Jaun is an 81 yo male here for dizziness    Per triage note:    \"Writer called patient to follow up on below. Patient clarified that he has not had any dizziness since yesterday.     Patient had OT session today and /70 and P 78 (per hypertension protocol - patient to be seen in office within 2 weeks)      Patient states prior to about a month ago, his blood pressures were typically stable around 120-130/70-80. Has noticed a pattern lately of blood pressures being lower than baseline      Patient currently denies any symptoms. Denies headache, chest pain, blurred vision, difficulty breathing, weakness. Denies any dizziness currently/\"      Describes this as lightheadedness going sitting to standing  This started when he was in the TCU a few weeks ago  He has lost weight (intentionally) and wonders if BP too low since having some dizzy spells at times.  After TCU stay he was having BPs with syst in the 100s  Denies any fainting or vertigo.  Denies chest pain or sob.  Had some palpitations yesterday lasting a few hours (note to cards copied below)  He is feeling more anxious and not sure why.  He was on some klonopin in the TCU which helped  Denies caffeine use        He had his pacemaker checked for the palpit:    Per cards note \"Pt sent transmission because HR was at 100bpm. No episodes recorded. Leads are stable. About 2.5 years remaining. He was recently in transitional care due to a sprained ankle. While he was at the TCU he was not doing much activity and little appetite. He has lost about 25lbs in the last 20 days. Since we didn't see anything on PPM transmission encouraged pt to go to ED if symptoms persist. ELIZABETH Cuevas    Pt is having an injection at the pain clinic in one week and talked to anticoag clinic where they recd:       Pre-Procedure:  ? Hold warfarin for 5 days, until after procedure starting: 10/15/2022   ? No Bridge       Post-Procedure:  ? Resume warfarin dose if okay with provider " doing procedure on night of procedure, 10/20/2022 PM: 10mg  ? Recheck INR ~ 7 days after resuming warfarin        Past Medical History:   Diagnosis Date     (HFpEF) heart failure with preserved ejection fraction (H)      Arthritis      BPH with urinary obstruction      CAD (coronary artery disease)     PCI w/ SUMMER in 2014     Cardiac pacemaker in situ      Chronic diastolic heart failure (H)      Complication of anesthesia      COPD (chronic obstructive pulmonary disease) (H)      Diabetes (H)      DVT (deep venous thrombosis) (H)      Dyspnea      Essential hypertension      Fatigue      HLD (hyperlipidemia)      Hx of long term use of blood thinners     Due to PE. Started in 2012     Hypothyroidism      Lymphedema      Mumps      Obesity, Class III, BMI 40-49.9 (morbid obesity) (H)      RIN (obstructive sleep apnea)     has refused CPAP     Pacemaker      Paroxysmal A-fib (H)      PE (pulmonary thromboembolism) (H)      Past Surgical History:   Procedure Laterality Date     cardiac stenting       CV RIGHT HEART CATH MEASUREMENTS RECORDED N/A 11/5/2019    Procedure: Right Heart Cath;  Surgeon: Roberto Hernandez MD;  Location:  HEART CARDIAC CATH LAB     ENT SURGERY      tonsillectomy     IMPLANT PACEMAKER       INJECT NERVE BLOCK SUPRASCAPULAR Bilateral 8/26/2021    Procedure: BLOCK, NERVE, SUPRASCAPULAR, Bilateral, under ultrasound guidance;  Surgeon: Swetha Allen MD;  Location: [facility] OR     INJECT NERVE BLOCK SUPRASCAPULAR Bilateral 9/23/2021    Procedure: bilateral suprascapular nerve block;  Surgeon: Swetha Allen MD;  Location: [facility] OR     INJECT NERVE BLOCK SUPRASCAPULAR Left 9/15/2022    Procedure: BLOCK, NERVE, SUPRASCAPULAR- left;  Surgeon: Swetha Allen MD;  Location: [facility] OR     NERVE BLOCK PERIPHERAL Bilateral 3/10/2022    Procedure: bilateral suprascapular nerve block;  Surgeon: Swetha Allen MD;  [place] [facility]     [place]THOPEDIC SURGERY Right 2012    knee replacement     Social History      Tobacco Use     Smoking status: Former     Packs/day: 0.50     Years: 25.00     Pack years: 12.50     Types: Cigarettes     Smokeless tobacco: Never     Tobacco comments:     quit in 1989   Substance Use Topics     Alcohol use: 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; recovering     Current Outpatient Medications   Medication Sig Dispense Refill     acetaminophen (TYLENOL) 500 MG tablet Take 1,000 mg by mouth 3 times daily       Acetylcarnitine HCl (ACETYL L-CARNITINE PO) Take 1 capsule by mouth daily       albuterol (PROAIR HFA/PROVENTIL HFA/VENTOLIN HFA) 108 (90 Base) MCG/ACT inhaler Inhale 2 puffs into the lungs 4 times daily as needed for shortness of breath / dyspnea or wheezing       aspirin (ASA) 81 MG chewable tablet Take 1 tablet by mouth daily       atorvastatin (LIPITOR) 40 MG tablet Take 1 tablet (40 mg) by mouth every evening 30 tablet 0     diltiazem ER COATED BEADS (CARDIZEM CD/CARTIA XT) 300 MG 24 hr capsule Take 1 capsule (300 mg) by mouth daily 30 capsule 0     Fluticasone-Umeclidin-Vilanterol (TRELEGY ELLIPTA) 200-62.5-25 MCG/INH oral inhaler Inhale 1 puff into the lungs daily       furosemide (LASIX) 20 MG tablet Take 1 tablet (20 mg) by mouth every evening 30 tablet 0     furosemide (LASIX) 40 MG tablet Take 1 tablet (40 mg) by mouth every morning 30 tablet 0     isosorbide mononitrate (IMDUR) 30 MG 24 hr tablet Take 1 tablet (30 mg) by mouth daily 90 tablet 0     levothyroxine (SYNTHROID/LEVOTHROID) 175 MCG tablet Take 1 tablet (175 mcg) by mouth daily 30 tablet 0     lisinopril (ZESTRIL) 20 MG tablet Take 1 tablet (20 mg) by mouth daily 30 tablet 0     metolazone (ZAROXOLYN) 2.5 MG tablet Take 1 tablet (2.5 mg) by mouth 2 times daily 180 tablet 0     Multiple vitamin TABS Take 1 tablet by mouth daily       polyethylene glycol (MIRALAX) 17 g packet Take 1 packet by mouth daily       spironolactone (ALDACTONE) 25 MG tablet Take 1 tablet (25 mg) by mouth every other day 20 tablet 0     tamsulosin  "(FLOMAX) 0.4 MG capsule Take 1 capsule (0.4 mg) by mouth daily 30 capsule 0     TAURINE PO Take 1 capsule by mouth daily       traMADol (ULTRAM) 50 MG tablet Take 0.5 tablets (25 mg) by mouth 2 times daily as needed for severe pain 10 tablet 0     venlafaxine (EFFEXOR) 37.5 MG tablet Take 1 tablet (37.5 mg) by mouth 2 times daily 60 tablet 0     Allergies   Allergen Reactions     No Known Allergies      FAMILY HISTORY NOTED AND REVIEWED    PHYSICAL EXAM:    /75 (BP Location: Right arm, Patient Position: Sitting, Cuff Size: Adult Large)   Pulse 92   Temp 96.9  F (36.1  C) (Temporal)   Resp 22   Ht 1.778 m (5' 10\")   Wt 109.3 kg (241 lb)   SpO2 94%   BMI 34.58 kg/m      Patient appears non toxic  Lungs: CTA  Heart: RRR with occas skip  Extr: trace non pitting lower ext edema with skin flaking    TSH   Date Value Ref Range Status   08/02/2022 1.01 0.40 - 4.00 mU/L Final   06/14/2021 0.71 0.40 - 4.00 mU/L Final     Assessment and Plan:     (R42) Lightheadedness  (primary encounter diagnosis)  Comment: orthostatic BPs normal  Plan: he has lost weight and we may be able to back off on his antihypertensives. For now recd he reduce his lisinopril from 20mg every day to 10mg every day. Cont to monitor BP. Recd f/u one month.    (I48.91) Atrial fibrillation, unspecified type (H)  Comment:   Plan: pt on warfarin. Has upcoming injection by the pain clinic and was given his warfarin instructions to hold x 5d, no bridging necessary.     (I10) Essential hypertension  Comment: well controlled.  Plan: Basic metabolic panel  (Ca, Cl, CO2, Creat,         Gluc, K, Na, BUN), lisinopril (ZESTRIL) 20 MG         tablet            (R00.2) Palpitations  Comment: noted HR around 100 yesterday and did have pacer interrogated  Plan: TSH with free T4 reflex, Hemoglobin, Basic         metabolic panel  (Ca, Cl, CO2, Creat, Gluc, K,         Na, BUN)            (E03.9) Hypothyroidism, unspecified type  Comment:   Plan: TSH with free T4 " reflex                  (Z79.01) Long term current use of anticoagulants with INR goal of 2.0-3.0  Comment:   Plan:     (Z95.0) Cardiac pacemaker in situ  Comment:   Plan:       Dasha Garcia PA-C

## 2022-10-13 NOTE — TELEPHONE ENCOUNTER
Writer returned phone call to MARIA DE JESUS Lange from [facility]. Writer clarified with Nazario which homecare agency is following patient, Nazario states [facility] has been following patient for a while.    Writer notified Nazario of provider's approval below:        No further questions/concerns at this time.    Peace Keith RN  [facility]

## 2022-10-13 NOTE — RESULT ENCOUNTER NOTE
Call pt and let him know that his hemoglobin and thyroid tests are normal.    His potassium is low and that could contribute to palpitations so I will send in a prescription for potassium that he should start and then needs potassium rechecked in one week so help schedule lab only    Thanks,  Dasha Garcia PA-C

## 2022-10-13 NOTE — TELEPHONE ENCOUNTER
Patient Contact    Attempt # 1    Was call answered?  No.  Left message on voicemail with information to call triage back.    On callback - review provider's result note below     Peace Keith RN  [facility]

## 2022-10-13 NOTE — TELEPHONE ENCOUNTER
----- Message from Dasha Garcia PA-C sent at 10/13/2022  2:36 PM CDT -----  Call pt and let him know that his hemoglobin and thyroid tests are normal.    His potassium is low and that could contribute to palpitations so I will send in a prescription for potassium that he should start and then needs potassium rechecked in one week so help schedule lab only    Thanks,  Dasha Garcia PA-C     2

## 2022-10-13 NOTE — TELEPHONE ENCOUNTER
Writer called and spoke with Rosemarie COONEY [facility] and notified of provider's approval above.    No further questions/concerns at this time.    Peace Keith RN  [facility]

## 2022-10-14 ENCOUNTER — ANTICOAGULATION THERAPY VISIT (OUTPATIENT)
Dept: ANTICOAGULATION | Facility: CLINIC | Age: 82
End: 2022-10-14

## 2022-10-14 ENCOUNTER — NURSE TRIAGE (OUTPATIENT)
Dept: FAMILY MEDICINE | Facility: CLINIC | Age: 82
End: 2022-10-14

## 2022-10-14 DIAGNOSIS — I82.4Z9 DEEP VEIN THROMBOSIS (DVT) OF DISTAL VEIN OF LOWER EXTREMITY, UNSPECIFIED CHRONICITY, UNSPECIFIED LATERALITY (H): ICD-10-CM

## 2022-10-14 DIAGNOSIS — I48.0 PAROXYSMAL A-FIB (H): ICD-10-CM

## 2022-10-14 DIAGNOSIS — I26.99 PE (PULMONARY THROMBOEMBOLISM) (H): Primary | ICD-10-CM

## 2022-10-14 DIAGNOSIS — Z79.01 LONG TERM CURRENT USE OF ANTICOAGULANTS WITH INR GOAL OF 2.0-3.0: ICD-10-CM

## 2022-10-14 LAB — INR (EXTERNAL): 3.7 (ref 0.9–1.1)

## 2022-10-14 NOTE — TELEPHONE ENCOUNTER
Writer called patient and reviewed result note below. Patient expressed verbal understanding and states he has already started potassium.    Writer scheduled patient for follow up lab appointment to check potassium:    10/19/2022 10:00 AM CS LAB [facility]     Peace Keith RN  [facility]

## 2022-10-14 NOTE — TELEPHONE ENCOUNTER
PCP can you please clarify?    Should patient be seen within 3 days (per protocol)?    Peace Keith RN  [facility]

## 2022-10-14 NOTE — TELEPHONE ENCOUNTER
"Writer called patient to notify of result note (see telephone encounter on 10/13/22) and patient reporting his blood pressure was low while home care was visiting.    BLOOD PRESSURE: BP 94/60 P 60, rechecked after 10 minutes 128/70 P 80  ONSET: while on phone with writer   HOW: Home care nurse present with patient checking BP   HISTORY: HTN, HF, A fib, pacemaker   MEDICATIONS: diltiazem   PULSE RATE: 60, 80  OTHER SYMPTOMS: denies dizziness, weakness, lightheadedness or any other symptoms at this time. Denies any palpations currently.     Patient states he has also been staying well hydrated. Patient was seen in office on 10/13/22 for \"dizziness and hypertension\" See recent triage encounter on 10/11/22 regarding blood pressures.     Per OV note on 10/13/22: \" he has lost weight and we may be able to back off on his antihypertensives. For now recd he reduce his lisinopril from 20mg every day to 10mg every day. Cont to monitor BP. Recd f/u one month\"    Per hypotension protocol, patient to be seen in office within 3 days. Please advise if patient needs follow up as he was just seen in office on 10/13/22 and is currently asymptomatic? Please advise on next steps     Writer advised patient seek ER/UC in the interim with any new or worsening symptoms. Reviewed red flag symptoms with patient. Patient expressed verbal understanding and is agreeable.     Callback [phone] - ok to leave detailed VM     Peace Keith RN  [facility]    Reason for Disposition    Fall in systolic BP > 20 mm Hg from normal and NOT dizzy, lightheaded, or weak    Additional Information    Negative: Systolic BP < 90 and feeling dizzy, lightheaded, or weak    Negative: Started suddenly after an allergic medicine, an allergic food, or bee sting    Negative: Shock suspected (e.g., cold/pale/clammy skin, too weak to stand, low BP, rapid pulse)    Negative: Difficult to awaken or acting confused (e.g., disoriented, slurred " speech)    Negative: Fainted    Negative: Chest pain    Negative: Bleeding (e.g., vomiting blood, rectal bleeding or tarry stools, severe vaginal bleeding)    Negative: Extra heart beats or heart is beating fast (i.e., 'palpitations')    Negative: Sounds like a life-threatening emergency to the triager    Negative: Systolic BP < 80 and NOT dizzy, lightheaded or weak (feels normal)    Negative: Abdominal pain    Negative: Major surgery in the past month    Negative: Fever > 100.4 F (38.0 C)    Negative: Drinking very little and has signs of dehydration (e.g., no urine > 12 hours, very dry mouth, very lightheaded)    Negative: Fall in systolic BP > 20 mm Hg from normal and feeling dizzy, lightheaded, or weak    Negative: Patient sounds very sick or weak to the triager    Negative: Systolic BP < 90 and NOT dizzy, lightheaded or weak    Negative: Systolic BP  while taking blood pressure medications and NOT dizzy, lightheaded or weak    Protocols used: BLOOD PRESSURE - LOW-A-OH

## 2022-10-14 NOTE — TELEPHONE ENCOUNTER
Writer called patient and scheduled patient for next available appointment next week:    10/18/2022 9:00 AM (Arrive by 8:40 AM) Dasha Garcia PA-C [facility]     Writer reiterated recommendation to call back with further questions/concerns be seen in ER/UCC with any red flag symptoms. Patient expressed verbal understanding.    No further questions/concerns at this time.    Peace Keith RN  [facility]

## 2022-10-14 NOTE — PROGRESS NOTES
ANTICOAGULATION MANAGEMENT     Erich Craven 82 year old male is on warfarin with supratherapeutic INR result. (Goal INR 2.0-3.0)    Recent labs: (last 7 days)     10/14/22  1051   INR 3.7*       ASSESSMENT       Source(s): Chart Review and Home Care/Facility Nurse       Warfarin doses taken: Warfarin taken as instructed    Diet: No new diet changes identified    New illness, injury, or hospitalization: No    Medication/supplement changes: None noted    Signs or symptoms of bleeding or clotting: No    Previous INR: Subtherapeutic, noted rapid rise this week including 2 holds and a small boost dose given on Monday    Additional findings: Upcoming surgery/procedure suprascapular nerve block injection on 10/20/22. Jaun will hold x 5 with no bridge, see orders in encounter 10/11/22       PLAN     Recommended plan for no diet, medication or health factor changes affecting INR     Dosing Instructions: partial hold today, then hold warfarin starting tomorrow for procedure next week. If okayed by provider doing the procedure restart warfarin as planned on 10/20, then decrease your warfarin dose (10 % change) with next INR ~ 1 week after resuming    Summary  As of 10/14/2022    Full warfarin instructions:  10/14: 2.5 mg; 10/15: Hold; 10/16: Hold; 10/17: Hold; 10/18: Hold; 10/19: Hold; 10/20: 7.5 mg; Otherwise 5 mg every day   Next INR check:  10/26/2022             Telephone call with [facility] home care nurse who verbalizes understanding and agrees to plan    Orders given to  Homecare nurse/facility to recheck    Education provided: Goal range and significance of current result    Plan made per [facility] anticoagulation protocol    Holly Mena, RN  Anticoagulation Clinic  10/14/2022    _______________________________________________________________________     Anticoagulation Episode Summary     Current INR goal:  2.0-3.0   TTR:  51.9 % (1 y)   Target end date:  Indefinite   Send INR reminders to:  SERENITY Hawkins     PE (pulmonary thromboembolism) (H) [I26.99]  Paroxysmal A-fib (H) [I48.0]  Deep vein thrombosis (DVT) of distal vein of lower extremity  unspecified chronicity  unspecified laterality (H) [I82.4Z9]  Long term current use of anticoagulants with INR goal of 2.0-3.0 [Z79.01]           Comments:           Anticoagulation Care Providers     Provider Role Specialty Phone number    Dontrell Gómez MD Referring Internal Medicine [phone]    Anne Heath MD Referring Internal Medicine [phone]

## 2022-10-17 ENCOUNTER — MEDICAL CORRESPONDENCE (OUTPATIENT)
Dept: FAMILY MEDICINE | Facility: CLINIC | Age: 82
End: 2022-10-17

## 2022-10-17 ENCOUNTER — LAB (OUTPATIENT)
Dept: URGENT CARE | Facility: URGENT CARE | Age: 82
End: 2022-10-17
Payer: MEDICARE

## 2022-10-17 DIAGNOSIS — Z20.822 ENCOUNTER FOR LABORATORY TESTING FOR COVID-19 VIRUS: ICD-10-CM

## 2022-10-17 LAB — SARS-COV-2 RNA RESP QL NAA+PROBE: NEGATIVE

## 2022-10-17 PROCEDURE — U0003 INFECTIOUS AGENT DETECTION BY NUCLEIC ACID (DNA OR RNA); SEVERE ACUTE RESPIRATORY SYNDROME CORONAVIRUS 2 (SARS-COV-2) (CORONAVIRUS DISEASE [COVID-19]), AMPLIFIED PROBE TECHNIQUE, MAKING USE OF HIGH THROUGHPUT TECHNOLOGIES AS DESCRIBED BY CMS-2020-01-R: HCPCS

## 2022-10-17 PROCEDURE — U0005 INFEC AGEN DETEC AMPLI PROBE: HCPCS

## 2022-10-17 ASSESSMENT — ANXIETY QUESTIONNAIRES
GAD7 TOTAL SCORE: 6
GAD7 TOTAL SCORE: 6
1. FEELING NERVOUS, ANXIOUS, OR ON EDGE: SEVERAL DAYS
7. FEELING AFRAID AS IF SOMETHING AWFUL MIGHT HAPPEN: NOT AT ALL
IF YOU CHECKED OFF ANY PROBLEMS ON THIS QUESTIONNAIRE, HOW DIFFICULT HAVE THESE PROBLEMS MADE IT FOR YOU TO DO YOUR WORK, TAKE CARE OF THINGS AT HOME, OR GET ALONG WITH OTHER PEOPLE: SOMEWHAT DIFFICULT
7. FEELING AFRAID AS IF SOMETHING AWFUL MIGHT HAPPEN: NOT AT ALL
4. TROUBLE RELAXING: SEVERAL DAYS
6. BECOMING EASILY ANNOYED OR IRRITABLE: SEVERAL DAYS
5. BEING SO RESTLESS THAT IT IS HARD TO SIT STILL: SEVERAL DAYS
3. WORRYING TOO MUCH ABOUT DIFFERENT THINGS: SEVERAL DAYS
8. IF YOU CHECKED OFF ANY PROBLEMS, HOW DIFFICULT HAVE THESE MADE IT FOR YOU TO DO YOUR WORK, TAKE CARE OF THINGS AT HOME, OR GET ALONG WITH OTHER PEOPLE?: SOMEWHAT DIFFICULT
2. NOT BEING ABLE TO STOP OR CONTROL WORRYING: SEVERAL DAYS
GAD7 TOTAL SCORE: 6

## 2022-10-18 ENCOUNTER — OFFICE VISIT (OUTPATIENT)
Dept: FAMILY MEDICINE | Facility: CLINIC | Age: 82
End: 2022-10-18
Payer: MEDICARE

## 2022-10-18 VITALS
HEART RATE: 75 BPM | OXYGEN SATURATION: 94 % | DIASTOLIC BLOOD PRESSURE: 71 MMHG | BODY MASS INDEX: 35.65 KG/M2 | HEIGHT: 70 IN | RESPIRATION RATE: 18 BRPM | TEMPERATURE: 98 F | SYSTOLIC BLOOD PRESSURE: 123 MMHG | WEIGHT: 249 LBS

## 2022-10-18 DIAGNOSIS — E87.6 HYPOKALEMIA: ICD-10-CM

## 2022-10-18 DIAGNOSIS — I50.32 CHRONIC DIASTOLIC HEART FAILURE (H): ICD-10-CM

## 2022-10-18 DIAGNOSIS — R42 LIGHTHEADEDNESS: Primary | ICD-10-CM

## 2022-10-18 DIAGNOSIS — I48.91 ATRIAL FIBRILLATION, UNSPECIFIED TYPE (H): ICD-10-CM

## 2022-10-18 LAB — POTASSIUM BLD-SCNC: 3.5 MMOL/L (ref 3.4–5.3)

## 2022-10-18 PROCEDURE — 84132 ASSAY OF SERUM POTASSIUM: CPT | Performed by: PHYSICIAN ASSISTANT

## 2022-10-18 PROCEDURE — 36415 COLL VENOUS BLD VENIPUNCTURE: CPT | Performed by: PHYSICIAN ASSISTANT

## 2022-10-18 PROCEDURE — 99213 OFFICE O/P EST LOW 20 MIN: CPT | Performed by: PHYSICIAN ASSISTANT

## 2022-10-18 RX ORDER — FUROSEMIDE 40 MG
40 TABLET ORAL EVERY MORNING
Qty: 90 TABLET | Refills: 1 | Status: SHIPPED | OUTPATIENT
Start: 2022-10-18 | End: 2023-04-19

## 2022-10-18 RX ORDER — POTASSIUM CHLORIDE 750 MG/1
10 TABLET, EXTENDED RELEASE ORAL DAILY
Qty: 90 TABLET | Refills: 0 | Status: SHIPPED | OUTPATIENT
Start: 2022-10-18 | End: 2022-11-11

## 2022-10-18 ASSESSMENT — PAIN SCALES - GENERAL: PAINLEVEL: MODERATE PAIN (4)

## 2022-10-18 NOTE — PROGRESS NOTES
HPI: Jaun is an 83 yo male here for f/u lightheadedness and hypokalemia.   He was seen by myself on 10/13/22 with complaints of intermittent lightheadedness and lower than normal BPs.  We had him cut his lisinopril in half at that time and pt feels this has helped.  He hasn't had any lightheadedness for 4 days.  Was found to have low potassium at the visit last week. Added Klor con so will recheck level today.  Does take lasix 60mg every day.  Last BP was 4 days ago and it was around 130/85; hasn't checked at home since then.  Weight at home running around 245lbs which has been stable.  Weight here is 8lbs higher than last week. Pt denies any orthopnea or increased edema.        Past Medical History:   Diagnosis Date     (HFpEF) heart failure with preserved ejection fraction (H)      Arthritis      BPH with urinary obstruction      CAD (coronary artery disease)     PCI w/ SUMMER in 2014     Cardiac pacemaker in situ      Chronic diastolic heart failure (H)      Complication of anesthesia      COPD (chronic obstructive pulmonary disease) (H)      Diabetes (H)      DVT (deep venous thrombosis) (H)      Dyspnea      Essential hypertension      Fatigue      HLD (hyperlipidemia)      Hx of long term use of blood thinners     Due to PE. Started in 2012     Hypothyroidism      Lymphedema      Mumps      Obesity, Class III, BMI 40-49.9 (morbid obesity) (H)      RIN (obstructive sleep apnea)     has refused CPAP     Pacemaker      Paroxysmal A-fib (H)      PE (pulmonary thromboembolism) (H)      Past Surgical History:   Procedure Laterality Date     cardiac stenting       CV RIGHT HEART CATH MEASUREMENTS RECORDED N/A 11/5/2019    Procedure: Right Heart Cath;  Surgeon: Roberto Hernandez MD;  Location:  HEART CARDIAC CATH LAB     ENT SURGERY      tonsillectomy     IMPLANT PACEMAKER       INJECT NERVE BLOCK SUPRASCAPULAR Bilateral 8/26/2021    Procedure: BLOCK, NERVE, SUPRASCAPULAR, Bilateral, under ultrasound guidance;   Surgeon: Swetha Allen MD;  Location: [facility] OR     INJECT NERVE BLOCK SUPRASCAPULAR Bilateral 9/23/2021    Procedure: bilateral suprascapular nerve block;  Surgeon: Swetha Allen MD;  Location: [facility] OR     INJECT NERVE BLOCK SUPRASCAPULAR Left 9/15/2022    Procedure: BLOCK, NERVE, SUPRASCAPULAR- left;  Surgeon: Swetha Allen MD;  Location: [facility] OR     NERVE BLOCK PERIPHERAL Bilateral 3/10/2022    Procedure: bilateral suprascapular nerve block;  Surgeon: Swetha Allen MD;  Location: [facility] OR     ORTHOPEDIC SURGERY Right 2012    knee replacement     Social History     Tobacco Use     Smoking status: Former     Packs/day: 0.50     Years: 25.00     Pack years: 12.50     Types: Cigarettes     Smokeless tobacco: Never     Tobacco comments:     quit in 1989   Substance Use Topics     Alcohol use: 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; recovering     Current Outpatient Medications   Medication Sig Dispense Refill     acetaminophen (TYLENOL) 500 MG tablet Take 1,000 mg by mouth 3 times daily       Acetylcarnitine HCl (ACETYL L-CARNITINE PO) Take 1 capsule by mouth daily       albuterol (PROAIR HFA/PROVENTIL HFA/VENTOLIN HFA) 108 (90 Base) MCG/ACT inhaler Inhale 2 puffs into the lungs 4 times daily as needed for shortness of breath / dyspnea or wheezing       aspirin (ASA) 81 MG chewable tablet Take 1 tablet by mouth daily       atorvastatin (LIPITOR) 40 MG tablet Take 1 tablet (40 mg) by mouth every evening 30 tablet 0     diltiazem ER COATED BEADS (CARDIZEM CD/CARTIA XT) 300 MG 24 hr capsule Take 1 capsule (300 mg) by mouth daily 30 capsule 0     Fluticasone-Umeclidin-Vilanterol (TRELEGY ELLIPTA) 200-62.5-25 MCG/INH oral inhaler Inhale 1 puff into the lungs daily       furosemide (LASIX) 20 MG tablet Take 1 tablet (20 mg) by mouth every evening 30 tablet 0     furosemide (LASIX) 40 MG tablet Take 1 tablet (40 mg) by mouth every morning 30 tablet 0     isosorbide mononitrate (IMDUR) 30 MG 24 hr tablet Take 1 tablet (30 mg) by  "mouth daily 90 tablet 0     levothyroxine (SYNTHROID/LEVOTHROID) 175 MCG tablet Take 1 tablet (175 mcg) by mouth daily 30 tablet 0     lisinopril (ZESTRIL) 20 MG tablet Take 0.5 tablets (10 mg) by mouth daily 30 tablet 0     metolazone (ZAROXOLYN) 2.5 MG tablet Take 1 tablet (2.5 mg) by mouth 2 times daily 180 tablet 0     Multiple vitamin TABS Take 1 tablet by mouth daily       polyethylene glycol (MIRALAX) 17 g packet Take 1 packet by mouth daily       potassium chloride ER (KLOR-CON M) 10 MEQ CR tablet Take 1 tablet (10 mEq) by mouth daily 30 tablet 0     spironolactone (ALDACTONE) 25 MG tablet Take 1 tablet (25 mg) by mouth every other day 20 tablet 0     tamsulosin (FLOMAX) 0.4 MG capsule Take 1 capsule (0.4 mg) by mouth daily 30 capsule 0     TAURINE PO Take 1 capsule by mouth daily       traMADol (ULTRAM) 50 MG tablet Take 0.5 tablets (25 mg) by mouth 2 times daily as needed for severe pain 10 tablet 0     venlafaxine (EFFEXOR) 37.5 MG tablet Take 1 tablet (37.5 mg) by mouth 2 times daily 60 tablet 0     Allergies   Allergen Reactions     No Known Allergies      FAMILY HISTORY NOTED AND REVIEWED      PHYSICAL EXAM:    /71 (BP Location: Right arm, Patient Position: Chair, Cuff Size: Adult Large)   Pulse 75   Temp 98  F (36.7  C) (Temporal)   Resp 18   Ht 1.778 m (5' 10\")   Wt 112.9 kg (249 lb)   SpO2 94%   BMI 35.73 kg/m      Patient appears non toxic  Heart: RRR without jvd  Lungs: CTA bilat  Extr: no weeping or pitting edema    Assessment and Plan:     (R42) Lightheadedness  (primary encounter diagnosis)  Comment:   Plan: improved on lower dose of lisinopril (10mg).  Cont that dose.  Report back if sxs recur    (E87.6) Hypokalemia  Comment:   Plan: recheck potassium today    (I50.32) Chronic diastolic heart failure (H)  Comment:   Plan: furosemide (LASIX) 40 MG tablet        refilled    (I48.91) Atrial fibrillation, unspecified type (H)  Comment: has warfarin held for upcoming injection by the " pain clinic  Plan: pt followed by cardiology      Dasha Garcia PA-C

## 2022-10-18 NOTE — RESULT ENCOUNTER NOTE
Jaun,    Your potassium level is now normal.   Please continue your current dose of Klor con.  I will send in a refill.    Dasha Garcia PA-C

## 2022-10-20 ENCOUNTER — ANCILLARY PROCEDURE (OUTPATIENT)
Dept: RADIOLOGY | Facility: AMBULATORY SURGERY CENTER | Age: 82
End: 2022-10-20
Attending: ANESTHESIOLOGY
Payer: MEDICARE

## 2022-10-20 ENCOUNTER — HOSPITAL ENCOUNTER (OUTPATIENT)
Facility: AMBULATORY SURGERY CENTER | Age: 82
Discharge: HOME | End: 2022-10-20
Attending: ANESTHESIOLOGY | Admitting: ANESTHESIOLOGY
Payer: MEDICARE

## 2022-10-20 VITALS
SYSTOLIC BLOOD PRESSURE: 137 MMHG | HEART RATE: 82 BPM | HEIGHT: 70 IN | RESPIRATION RATE: 16 BRPM | BODY MASS INDEX: 35.65 KG/M2 | DIASTOLIC BLOOD PRESSURE: 74 MMHG | WEIGHT: 249 LBS | OXYGEN SATURATION: 95 % | TEMPERATURE: 95.9 F

## 2022-10-20 DIAGNOSIS — R52 PAIN: ICD-10-CM

## 2022-10-20 DIAGNOSIS — M25.512 CHRONIC LEFT SHOULDER PAIN: Primary | ICD-10-CM

## 2022-10-20 DIAGNOSIS — G89.29 CHRONIC LEFT SHOULDER PAIN: Primary | ICD-10-CM

## 2022-10-20 PROCEDURE — 64418 NJX AA&/STRD SPRSCAP NRV: CPT | Mod: LT

## 2022-10-20 PROCEDURE — 76942 ECHO GUIDE FOR BIOPSY: CPT | Mod: 26 | Performed by: ANESTHESIOLOGY

## 2022-10-20 PROCEDURE — 64418 NJX AA&/STRD SPRSCAP NRV: CPT | Mod: LT | Performed by: ANESTHESIOLOGY

## 2022-10-20 RX ORDER — WARFARIN SODIUM 1 MG/1
1.5 TABLET ORAL DAILY
COMMUNITY
End: 2023-01-23

## 2022-10-20 NOTE — OP NOTE
Patient: Erich Craven Age: 82 years old   MRN: [id number] Attending: Dr. Allen            PAIN MEDICINE CLINIC PROCEDURE NOTE     ATTENDING CLINICIAN:    Swetha Allen MD     ASSISTANT CLINICIAN:   Kris Hahn MD       PREPROCEDURE DIAGNOSES:  1.  Left  shoulder pain  2.  Left rotator cuff tear     PROCEDURE(S) PERFORMED:  1.  Left  suprascapular nerve block  2.  Ultrasound guidance for the above-named procedure(s)        ANESTHESIA:  Local.     BLOOD LOSS:  Minimal.     DRAINS AND SPECIMENS:  None.     COMPLICATIONS:  None.     INDICATIONS:  Erich Craven is a 82 year old male with a history of  chronic shoulder pain secondary to rotator cuff tear.  The patient stated that the patient was in their usual state of health and denied recent anticoagulant use or recent infections.  Therefore, the plan is ti perform above mentioned procedure.      Procedure Details:     The patient was met in the procedure room, where the patient was identified by name, medical record number and date of birth.  All of the patient s last minute questions were answered. Written informed consent was obtained and saved in the electronic medical record, after the risks, benefits, and alternatives were discussed with the patient.       A formal time-out procedure was performed, as per protocol, including patient name, title of procedure, and site of procedure, and all in the room concurred.  Routine monitors were applied.       The patient was placed in the sitting position on the procedure room table.Routine monitors were placed.  Vital signs were stable.     A chlorhexidine prep was completed followed by sterile draping per standard procedure.  Patient's consent was obtained.  The left  Suprascapular area was prepped using Chloraprep and the area was sterilely draped.  The ultrasound probe was draped and the U/S was used to identify and confirm the depth.  Fascial planes were easily visible as well as subcutaneous tissue and the  supraspinatus fossae with the scapula as the deep border.  A 21G 100 mm  pajunk needle was used in an in-plane fashion and positioned deep to the hyperechoic suprascapular ligament taking care to avoid the visibly pulsatile suprascapular artery.   ~1ml of the steroid local anesthetic was injected to confirm correct placement of the needle tip prior to injection of the remaining 5 mls of 0.25% bupivacaine 4.5 ml and 20 mg( 0.5 ml) of triamcinolone  into the area, with several aspirations being negative for blood.  A screen shot,was taken of nicely extravasating fluid into the the expected location of the supraspinatus nerve.  The needle was removed, and a band-aid was applied.           Light pressure was held at the puncture site(s) to prevent ecchymosis and oozing.  The patient's skin was cleansed, and hemostasis was confirmed.  Band-aids were applied to the needle injection site(s).       Condition:     The patient remained awake and alert throughout the procedure.  The patient tolerated the procedure well and was monitored for approximately 15 minutes afterward in the post procedure area.  There were no immediate post procedure complications noted.  The patient was then discharged to home as per protocol.       Pre-procedure pain score: 6/10  Post-procedure pain score: 0/10

## 2022-10-20 NOTE — DISCHARGE INSTRUCTIONS
PAIN INJECTION HOME CARE INSTRUCTIONS  Activity  -You may resume most normal activity levels with the exception of strenuous activity. It may help to move in ways that hurt before the injection, to see if the pain is still there, but do not overdo it.     -DO NOT remove bandaid for 24 hours  -DO NOT shower for 24 hours      Pain  -You may feel immediate pain relief and numbness for a period of time after the injection. This may indicate the medication has reached the right spot.  -Your pain may return after this short pain-free period, or may even be a little worse for a day or two. It may be caused by needle irritation or by the medication itself. The medications usually take two or three days to start working, but can take as long as a week.    -You may use an ice pack for 20 minutes every 2 hours for the first 24 hours  -You may use a heating pad after the first 24 hours  -You may use Tylenol (acetaminophen) every 4 hours or other pain medicines as directed by your physician    DID YOU RECEIVE STEROIDS TODAY?  Yes    Common side effects of steroids:  Not everyone will experience corticosteroid side effects. If side effects are experienced, they will gradually subside in the 7-10 day period following an injection. Most common side effects include:  -Flushed face and/or chest  -Feeling of warmth, particularly in the face but could be an overall feeling of warmth  -Increased blood sugar in diabetic patients  -Menstrual irregularities my occur. If taking hormone-based birth control an alternate method of birth control is recommended  -Sleep disturbances and/or mood swings are possible  -Leg cramps    PLEASE KEEP TRACK OF YOUR SYMPTOMS AND NOTE ANY CHANGES FOR YOUR DOCTOR.       Please contact us if you have:  -Severe pain  -Fever more than 101.5 degrees Fahrenheit  -Signs of infection at the injection site (redness, swelling, or drainage)      FOR PAIN CENTER PATIENTS of Dr Tillman:  If you have questions, please  contact the Pain Clinic at [phone] Option #1 between the hours of 7:00 am and 3:00 pm Monday through Friday. After office hours you can contact the on call provider by dialing [phone]. If you need immediate attention, we recommend that you go to a hospital emergency room or dial [phone].      FOR PM&R PATIENTS of Dr [facility]:  For patients seen by the PM and R service, please call [phone].(Monday through Friday 8a-5p.  After business hours and weekends call [phone] and ask for the PM and R doctor on call). If you need to fax a pain diary to PM&R the fax number is [phone]. If you are unable to fax, uploading to [employer] is encouraged, then send to provider. If you have procedure scheduling questions please call [phone] Option #2

## 2022-10-26 ENCOUNTER — ANTICOAGULATION THERAPY VISIT (OUTPATIENT)
Dept: ANTICOAGULATION | Facility: CLINIC | Age: 82
End: 2022-10-26

## 2022-10-26 ENCOUNTER — MEDICAL CORRESPONDENCE (OUTPATIENT)
Dept: HEALTH INFORMATION MANAGEMENT | Facility: CLINIC | Age: 82
End: 2022-10-26

## 2022-10-26 DIAGNOSIS — I26.99 PE (PULMONARY THROMBOEMBOLISM) (H): Primary | ICD-10-CM

## 2022-10-26 DIAGNOSIS — Z79.01 LONG TERM CURRENT USE OF ANTICOAGULANTS WITH INR GOAL OF 2.0-3.0: ICD-10-CM

## 2022-10-26 DIAGNOSIS — I48.0 PAROXYSMAL A-FIB (H): ICD-10-CM

## 2022-10-26 DIAGNOSIS — I82.4Z9 DEEP VEIN THROMBOSIS (DVT) OF DISTAL VEIN OF LOWER EXTREMITY, UNSPECIFIED CHRONICITY, UNSPECIFIED LATERALITY (H): ICD-10-CM

## 2022-10-26 LAB — INR (EXTERNAL): 1.8 (ref 0.9–1.1)

## 2022-10-26 NOTE — PROGRESS NOTES
ANTICOAGULATION MANAGEMENT     Erich Craven 82 year old male is on warfarin with subtherapeutic INR result. (Goal INR 2.0-3.0)    Recent labs: (last 7 days)     10/26/22  1240   INR 1.8*       ASSESSMENT       Source(s): Chart Review and Home Care/Facility Nurse       Warfarin doses taken: Held for Procedure  recently which may be affecting INR, patient did not restart warfarin on 10/20 as planned, he took a boost of 7.5 on 10/21 and 10/22    Diet: No new diet changes identified    New illness, injury, or hospitalization: No    Medication/supplement changes: None noted    Signs or symptoms of bleeding or clotting: No    Previous INR: Supratherapeutic    Additional findings: None       PLAN     Recommended plan for temporary change(s) affecting INR     Dosing Instructions: booster dose then continue your current warfarin dose with next INR in 1 week       Summary  As of 10/26/2022    Full warfarin instructions:  10/26: 7.5 mg; Otherwise 5 mg every day; Starting 10/26/2022   Next INR check:  11/2/2022             Telephone call with [facility] care nurse who verbalizes understanding and agrees to plan    Orders given to  Homecare nurse/facility to recheck    Education provided:     Please call back if any changes to your diet, medications or how you've been taking warfarin    Plan made per [facility] anticoagulation protocol    Katelyn Stoner RN  Anticoagulation Clinic  10/26/2022    _______________________________________________________________________     Anticoagulation Episode Summary     Current INR goal:  2.0-3.0   TTR:  53.6 % (1 y)   Target end date:  Indefinite   Send INR reminders to:  [facility]    Indications    PE (pulmonary thromboembolism) (H) [I26.99]  Paroxysmal A-fib (H) [I48.0]  Deep vein thrombosis (DVT) of distal vein of lower extremity  unspecified chronicity  unspecified laterality (H) [I82.4Z9]  Long term current use of anticoagulants with INR goal of 2.0-3.0 [Z79.01]           Comments:            Anticoagulation Care Providers     Provider Role Specialty Phone number    Dontrell Gómez MD Referring Internal Medicine [phone]    Anne Heath MD Referring Internal Medicine [phone]

## 2022-10-27 ENCOUNTER — VIRTUAL VISIT (OUTPATIENT)
Dept: PHARMACY | Facility: CLINIC | Age: 82
End: 2022-10-27
Payer: COMMERCIAL

## 2022-10-27 DIAGNOSIS — I82.4Z9 DEEP VEIN THROMBOSIS (DVT) OF DISTAL VEIN OF LOWER EXTREMITY, UNSPECIFIED CHRONICITY, UNSPECIFIED LATERALITY (H): ICD-10-CM

## 2022-10-27 DIAGNOSIS — I50.33 ACUTE ON CHRONIC HEART FAILURE WITH PRESERVED EJECTION FRACTION (H): ICD-10-CM

## 2022-10-27 DIAGNOSIS — I26.99 PE (PULMONARY THROMBOEMBOLISM) (H): ICD-10-CM

## 2022-10-27 DIAGNOSIS — N40.0 BENIGN PROSTATIC HYPERPLASIA WITHOUT LOWER URINARY TRACT SYMPTOMS: ICD-10-CM

## 2022-10-27 DIAGNOSIS — G89.29 CHRONIC PAIN OF BOTH SHOULDERS: ICD-10-CM

## 2022-10-27 DIAGNOSIS — E78.49 OTHER HYPERLIPIDEMIA: ICD-10-CM

## 2022-10-27 DIAGNOSIS — K59.00 CONSTIPATION, UNSPECIFIED CONSTIPATION TYPE: ICD-10-CM

## 2022-10-27 DIAGNOSIS — I25.10 CORONARY ARTERY DISEASE INVOLVING NATIVE HEART WITHOUT ANGINA PECTORIS, UNSPECIFIED VESSEL OR LESION TYPE: ICD-10-CM

## 2022-10-27 DIAGNOSIS — I48.0 PAROXYSMAL A-FIB (H): ICD-10-CM

## 2022-10-27 DIAGNOSIS — M25.511 CHRONIC PAIN OF BOTH SHOULDERS: ICD-10-CM

## 2022-10-27 DIAGNOSIS — I10 ESSENTIAL HYPERTENSION: ICD-10-CM

## 2022-10-27 DIAGNOSIS — F41.9 ANXIETY: ICD-10-CM

## 2022-10-27 DIAGNOSIS — Z78.9 TAKES DIETARY SUPPLEMENTS: ICD-10-CM

## 2022-10-27 DIAGNOSIS — M25.512 CHRONIC PAIN OF BOTH SHOULDERS: ICD-10-CM

## 2022-10-27 DIAGNOSIS — E03.9 HYPOTHYROIDISM, UNSPECIFIED TYPE: ICD-10-CM

## 2022-10-27 DIAGNOSIS — S93.401D SPRAIN OF RIGHT ANKLE, UNSPECIFIED LIGAMENT, SUBSEQUENT ENCOUNTER: Primary | ICD-10-CM

## 2022-10-27 DIAGNOSIS — J44.9 CHRONIC OBSTRUCTIVE PULMONARY DISEASE, UNSPECIFIED COPD TYPE (H): ICD-10-CM

## 2022-10-27 PROCEDURE — 99607 MTMS BY PHARM ADDL 15 MIN: CPT | Performed by: PHARMACIST

## 2022-10-27 PROCEDURE — 99606 MTMS BY PHARM EST 15 MIN: CPT | Performed by: PHARMACIST

## 2022-10-27 RX ORDER — VENLAFAXINE 75 MG/1
75 TABLET ORAL 2 TIMES DAILY
Qty: 180 TABLET | Refills: 1 | Status: SHIPPED | OUTPATIENT
Start: 2022-10-27 | End: 2023-04-19

## 2022-10-27 NOTE — PATIENT INSTRUCTIONS
"Recommendations from today's MTM visit:                                                    MTM (medication therapy management) is a service provided by a clinical pharmacist designed to help you get the most of out of your medicines.   Today we reviewed what your medicines are for, how to know if they are working, that your medicines are safe and how to make your medicine regimen as easy as possible.      We will increase venlafaxine to 75mg twice daily.  I sent an updated prescription to your pharmacy.  I'll talk with Dr. Heath about getting you a referral to meet with a counselor.  I'll let you know once I hear back from him.  Try taking the PM doses of furosemide and metolazone mid-afternoon to see if this helps reduce frequent urination overnight.    Follow-up: Return in about 6 weeks (around 12/8/2022) for check in via [employer].    It was great speaking with you today.  I value your experience and would be very thankful for your time in providing feedback in our clinic survey. In the next few days, you may receive an email or text message from [employer] with a link to a survey related to your  clinical pharmacist.\"     To schedule another MTM appointment, please call the clinic directly or you may call the MTM scheduling line at [phone] or toll-free at [phone].     My Clinical Pharmacist's contact information:                                                      Please feel free to contact me with any questions or concerns you have.      Kassi Glez, Masood, [facility]  Medication Therapy Management Provider  Pager: [phone]    "

## 2022-10-27 NOTE — Clinical Note
Patient is interested in meeting with a therapist to discuss techniques for managing anxiety.  Are you ok with me putting in a referral for this? Thanks! Kassi Glez, ElliD, [facility] Medication Therapy Management Provider Pager: [phone]

## 2022-10-27 NOTE — PROGRESS NOTES
Medication Therapy Management (MTM) Encounter    ASSESSMENT:                            Medication Adherence/Access: No issues identified    Ankle Sprain/Shoulder Pain: Plan in place.    Anxiety/Depression: Needs improvement, may benefit from establishing with therapist and increasing venlafaxine dose.    Hypertension/A. Fib/HFpEF/H/o PE/DVT/CAD: May benefit from moving PM doses of furosemide and metolazone to take mid-afternoon instead of evening to help prevent nocturia.    Hyperlipidemia: Stable.  Patient is on high intensity statin which is indicated based on 2019 ACC/AHA guidelines for lipid management.       COPD: Stable     Hypothyroidism: Stable. Last TSH is within normal limits.      BPH:  As above.    Constipation:  Stable.    Supplements:  No indication for acetyl-l-carnitine or taurine, we've discussed stopping these several times and he prefers to continue.    PLAN:                            1.  Increased venlafaxine to 75mg twice daily.   2.  Will talk with Dr. Heath about a referral to for therapy services.  3.  Recommended moving PM furosemide and metolazone to take mid afternoon.    Follow-up: Return in about 6 weeks (around 12/8/2022) for check in via [email or web address].    SUBJECTIVE/OBJECTIVE:                          Erich Craven is a 82 year old male contacted via secure video for a transitions of care visit. He was discharged from [facility] on 10/4/22 for rehab following right ankle sprain.      Reason for visit: Follow-up from our visit on 8/1/2022.    Tobacco: He reports that he has quit smoking. His smoking use included cigarettes. He has a 12.50 pack-year smoking history. He has never used smokeless tobacco.  Alcohol: history of alcohol dependence - sober since 1989    Medication Adherence/Access: no issues reported    Ankle Sprain/Shoulder Pain:  He reports he's recovering well from ankle sprain.  He's doing physical therapy and finds this helpful.  He's no longer wearing a boot.  He  continues taking acetaminophen 1000mg three times daily (for ankle sprain and shoulder pain) and tramadol 25mg twice daily as needed (currently using about once daily).  He reports pain is manageable with this regimen.  Jaun recently received suprascapular nerve block for left shoulder pain and he's hopeful that'll help shoulder pain a bit more.  He's scheduled for total shoulder replacement in February at [facility], but is thinking about moving this to have done at [facility] so he can go to local TCU afterward.    Anxiety/Depression: Jaun continues taking venlafaxine 37.5 mg twice daily.  He reports he's been having more difficulty with anxiety recently, some due to recent hospitalization/TCU stay but says he was also struggling a bit before that.  He denies suicidal ideation.  He's open to both medication changes as well as meeting with a therapist.    Hypertension/A. Fib/HFpEF/H/o PE/DVT/CAD:  Patient has history of coronary artery disease with stenting of the proximal left anterior descending artery and also stenting of the proximal to mid right coronary artery in 2014.  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     Current medications include aspirin 81mg daily, warfarin as directed (was on hold for nerve block, resumed following with close INR monitoring), diltiazem ER 300mg daily, furosemide 40 mg every morning and 20 mg every evening (with recent addition of KCl 10mEq daily), Imdur 30mg daily, lisinopril 10mg daily (dose recently reduced due to hypotension), metolazone 2.5mg twice daily and spironolactone 25mg every other day.  Carvedilol has been discontinued.  Patient does not self-monitor blood pressure.  Patient reports no signs and symptoms bruising/bleeding, but does complain of nocturia.  Patient denies any HF symptoms.  He is monitoring blood pressure at home and reports 130s/70s.  Last EF 2/27/19 - 66%  He continues working on weight loss - reports current weight is 253#    INR (External)   Date Value Ref  Range Status   10/26/2022 1.8 (A) 0.9 - 1.1 Final       BP Readings from Last 3 Encounters:   10/20/22 137/74   10/18/22 123/71   10/13/22 113/75      Potassium   Date Value Ref Range Status   10/18/2022 3.5 3.4 - 5.3 mmol/L Final   06/21/2021 4.1 3.4 - 5.3 mmol/L Final        Hyperlipidemia: Current therapy includes atorvastatin 40mg daily.  Patient reports no significant myalgias or other side effects.  Recent Labs   Lab Test 08/02/22  0829 05/03/22  1656   CHOL 129 141   HDL 48 48   LDL 63 57   TRIG 92 180*     COPD: Current medications: albuterol MDI as needed (no recent use) and Trelegy Ellipta 200mcg daily.    Patient is not experiencing side effects.   Patient reports the following symptoms: none.  Patient does have an COPD Action Plan on file.   Has spirometry been completed: Yes     Hypothyroidism: Patient is taking levothyroxine 175 mcg daily. Patient is having the following symptoms: none.   TSH   Date Value Ref Range Status   10/13/2022 3.07 0.40 - 4.00 mU/L Final   06/14/2021 0.71 0.40 - 4.00 mU/L Final        BPH: Patient is taking tamsulosin 0.4 mg daily.  He reports urinary symptoms are worsened, as noted above.  No side effects reported.    Constipation:  Patient is using Miralax 17g daily, which he reports is effective.  No side effects reported.    Supplements: Jaun continues taking acetyl-l-carnitine, a daily multivitamin and taurine.  He finds these effective and wishes to continue using.  No side effects reported.    Today's Vitals: There were no vitals taken for this visit.  ----------------  Post Discharge Medication Reconciliation Status: discharge medications reconciled and changed, per note/orders.    I spent 20 minutes with this patient today. All changes were made via collaborative practice agreement with Dr. Heath. A copy of the visit note was provided to the patient's provider(s).    The patient was sent via [email or web address] a summary of these recommendations.     Kassi Glez, PharmD,  [facility]  Medication Therapy Management Provider  Pager: [phone]     Telemedicine Visit Details  Type of service:  Video Conference via [employer]  Start Time: 8:01 AM  End Time: 8:21 AM  Originating Location (pt. Location): Home      Distant Location (provider location):  Off-site  Provider has received verbal consent for a visit from the patient? Yes     Medication Therapy Recommendations  (HFpEF) heart failure with preserved ejection fraction (H)    Current Medication: furosemide (LASIX) 20 MG tablet   Rationale: Undesirable effect - Adverse medication event - Safety   Recommendation: Change Administration Time   Status: Patient Agreed - Adherence/Education         Anxiety    Current Medication: venlafaxine (EFFEXOR) 37.5 MG tablet (Discontinued)   Rationale: Dose too low - Dosage too low - Effectiveness   Recommendation: Increase Dose - venlafaxine 75 MG tablet   Status: Accepted per CPA

## 2022-10-28 NOTE — PROGRESS NOTES
Anne Heath MD Burres, Heidi E, PharmD  Hi Kassi, sure I agree with you placing a referral for patient to see the therapist for managing his anxiety   Thank you,   Anne               Referral placed, patient notified via [employer].  Kassi Glez PharmD, [facility]  Medication Therapy Management Provider  Pager: [phone]

## 2022-10-28 NOTE — TELEPHONE ENCOUNTER
DIAGNOSIS: Chronic left shoulder pain/ Swetha Allen MD in [facility]/ Referral to Dr. Vasquez. Second opinion regarding left shoulder surgery. Patient is scheduled with [facility] in February 2023 but wants another opinion   APPOINTMENT DATE: 11.16.22   NOTES STATUS DETAILS   OFFICE NOTE from referring provider Internal    OFFICE NOTE from other specialist Care Everywhere 8.18.22Dr Livan Jeffries [facility]   OPERATIVE REPORT Care Everywhere    Internal 7.6.22 ARTHROPLASTY REPLACEMENT TOTAL SHOULDER    Procedure: 10.20.22   MEDICATION LIST Internal    LABS     CBC/DIFF Internal    XRAYS (IMAGES & REPORTS) PACS 1.10.22 B shoulder, [facility]  2.26.21 B shoulder  12.10.15 L shoulder, [facility]  12.18.14 L shoulder, [facility]     Action 10.28.22 1:24 PM [facility]    Action Taken Faxed request to [facility] [phone]     Called [facility] and LVM.     Action 11.3.22 8:48 AM [facility]   Action Taken Tried calling [facility], not open yet.    12:05 - Faxed request for images to [facility] [phone]     Action November 7, 2022 1:38 PM MT   Action Taken Image disk from [employer] received and sent to Satish for upload.

## 2022-11-02 ENCOUNTER — ANTICOAGULATION THERAPY VISIT (OUTPATIENT)
Dept: ANTICOAGULATION | Facility: CLINIC | Age: 82
End: 2022-11-02

## 2022-11-02 DIAGNOSIS — I26.99 PE (PULMONARY THROMBOEMBOLISM) (H): Primary | ICD-10-CM

## 2022-11-02 DIAGNOSIS — I48.0 PAROXYSMAL A-FIB (H): ICD-10-CM

## 2022-11-02 DIAGNOSIS — Z79.01 LONG TERM CURRENT USE OF ANTICOAGULANTS WITH INR GOAL OF 2.0-3.0: ICD-10-CM

## 2022-11-02 DIAGNOSIS — I82.4Z9 DEEP VEIN THROMBOSIS (DVT) OF DISTAL VEIN OF LOWER EXTREMITY, UNSPECIFIED CHRONICITY, UNSPECIFIED LATERALITY (H): ICD-10-CM

## 2022-11-02 LAB — INR (EXTERNAL): 2.4 (ref 2–3)

## 2022-11-02 NOTE — PROGRESS NOTES
ANTICOAGULATION MANAGEMENT     Erich Craven 82 year old male is on warfarin with therapeutic INR result. (Goal INR 2.0-3.0)    Recent labs: (last 7 days)     11/02/22  1307   INR 2.4       ASSESSMENT       Source(s): Chart Review and Patient/Caregiver Call       Warfarin doses taken: Warfarin taken as instructed    Diet: No new diet changes identified    New illness, injury, or hospitalization: No    Medication/supplement changes: None noted    Signs or symptoms of bleeding or clotting: No    Previous INR: Subtherapeutic    Additional findings: Home care discharging today       PLAN     Recommended plan for no diet, medication or health factor changes affecting INR     Dosing Instructions: Continue your current warfarin dose with next INR in 1 week       Summary  As of 11/2/2022    Full warfarin instructions:  5 mg every day; Starting 11/2/2022   Next INR check:  11/9/2022             Telephone call with [facility] home care nurse who verbalizes understanding and agrees to plan  Detailed voice message left for Jaun with dosing instructions and follow up date.     Contact [phone]  to schedule and with any changes, questions or concerns.     Education provided:     Please call back if any changes to your diet, medications or how you've been taking warfarin    Plan made per [facility] anticoagulation protocol    Ayse Larson, RN  Anticoagulation Clinic  11/2/2022    _______________________________________________________________________     Anticoagulation Episode Summary     Current INR goal:  2.0-3.0   TTR:  53.8 % (1 y)   Target end date:  Indefinite   Send INR reminders to:  [facility]    Indications    PE (pulmonary thromboembolism) (H) [I26.99]  Paroxysmal A-fib (H) [I48.0]  Deep vein thrombosis (DVT) of distal vein of lower extremity  unspecified chronicity  unspecified laterality (H) [I82.4Z9]  Long term current use of anticoagulants with INR goal of 2.0-3.0 [Z79.01]           Comments:            Anticoagulation Care Providers     Provider Role Specialty Phone number    Dontrell Gómez MD Referring Internal Medicine [phone]    Anne Heath MD Referring Internal Medicine [phone]

## 2022-11-04 ENCOUNTER — MYC REFILL (OUTPATIENT)
Dept: ANESTHESIOLOGY | Facility: CLINIC | Age: 82
End: 2022-11-04

## 2022-11-04 DIAGNOSIS — G89.29 CHRONIC LEFT SHOULDER PAIN: ICD-10-CM

## 2022-11-04 DIAGNOSIS — M25.512 CHRONIC LEFT SHOULDER PAIN: ICD-10-CM

## 2022-11-07 DIAGNOSIS — I50.30 (HFPEF) HEART FAILURE WITH PRESERVED EJECTION FRACTION (H): ICD-10-CM

## 2022-11-07 DIAGNOSIS — M25.512 LEFT SHOULDER PAIN, UNSPECIFIED CHRONICITY: Primary | ICD-10-CM

## 2022-11-07 RX ORDER — ISOSORBIDE MONONITRATE 30 MG/1
30 TABLET, EXTENDED RELEASE ORAL DAILY
Qty: 90 TABLET | Refills: 0 | Status: SHIPPED | OUTPATIENT
Start: 2022-11-07 | End: 2022-11-16

## 2022-11-07 RX ORDER — TRAMADOL HYDROCHLORIDE 50 MG/1
25 TABLET ORAL 2 TIMES DAILY PRN
Qty: 30 TABLET | Refills: 0 | Status: SHIPPED | OUTPATIENT
Start: 2022-11-07 | End: 2022-12-08

## 2022-11-08 ENCOUNTER — TELEPHONE (OUTPATIENT)
Dept: ANESTHESIOLOGY | Facility: CLINIC | Age: 82
End: 2022-11-08

## 2022-11-08 NOTE — TELEPHONE ENCOUNTER
Per [employer]. PA is handled by a different health plan. Plan is through [employer]. PA is handled by [facility] [employer] Phone [phone]. Completed form received from [employer] and faxed back.

## 2022-11-08 NOTE — TELEPHONE ENCOUNTER
Prior Authorization Retail Medication Request    Medication/Dose: traMADol (ULTRAM) 50 MG tablet    Take 0.5 tablets (25 mg) by mouth 2 times daily as needed for severe pain       ICD code (if different than what is on RX):    Previously Tried and Failed:    Rationale:      Insurance Name:    Insurance ID:        Pharmacy Information (if different than what is on RX)  Name:    Phone:          Linda Serrano RN

## 2022-11-08 NOTE — TELEPHONE ENCOUNTER
Central Prior Authorization Team   Phone: [phone]      PA Initiation    Medication: traMADol (ULTRAM) 50 MG tablet  Insurance Company: [employer] - Phone [phone] Fax [phone]  Pharmacy Filling the Rx: [id number] - [place], [place] - [address] AT [address] [place] [address] & [facility]  Filling Pharmacy Phone: [phone]  Filling Pharmacy Fax:    Start Date: 11/8/2022

## 2022-11-10 ENCOUNTER — TELEPHONE (OUTPATIENT)
Dept: FAMILY MEDICINE | Facility: CLINIC | Age: 82
End: 2022-11-10

## 2022-11-10 ENCOUNTER — DOCUMENTATION ONLY (OUTPATIENT)
Dept: ANTICOAGULATION | Facility: CLINIC | Age: 82
End: 2022-11-10

## 2022-11-10 DIAGNOSIS — E03.9 HYPOTHYROIDISM, UNSPECIFIED TYPE: ICD-10-CM

## 2022-11-10 DIAGNOSIS — I50.32 CHRONIC DIASTOLIC HEART FAILURE (H): ICD-10-CM

## 2022-11-10 DIAGNOSIS — E87.6 HYPOKALEMIA: ICD-10-CM

## 2022-11-10 NOTE — PROGRESS NOTES
ANTICOAGULATION     Erich Craven is overdue for INR check.      Left message with [facility] to check INR ASAP - IRN was due 11/9    Clara Pitts RN

## 2022-11-11 ENCOUNTER — LAB (OUTPATIENT)
Dept: LAB | Facility: CLINIC | Age: 82
End: 2022-11-11
Payer: MEDICARE

## 2022-11-11 ENCOUNTER — ANTICOAGULATION THERAPY VISIT (OUTPATIENT)
Dept: ANTICOAGULATION | Facility: CLINIC | Age: 82
End: 2022-11-11

## 2022-11-11 DIAGNOSIS — I82.4Z9 DEEP VEIN THROMBOSIS (DVT) OF DISTAL VEIN OF LOWER EXTREMITY, UNSPECIFIED CHRONICITY, UNSPECIFIED LATERALITY (H): ICD-10-CM

## 2022-11-11 DIAGNOSIS — Z79.01 LONG TERM CURRENT USE OF ANTICOAGULANTS WITH INR GOAL OF 2.0-3.0: ICD-10-CM

## 2022-11-11 DIAGNOSIS — I48.0 PAROXYSMAL A-FIB (H): ICD-10-CM

## 2022-11-11 DIAGNOSIS — I26.99 PE (PULMONARY THROMBOEMBOLISM) (H): ICD-10-CM

## 2022-11-11 DIAGNOSIS — I26.99 PE (PULMONARY THROMBOEMBOLISM) (H): Primary | ICD-10-CM

## 2022-11-11 LAB — INR BLD: 1.7 (ref 0.9–1.1)

## 2022-11-11 PROCEDURE — 36416 COLLJ CAPILLARY BLOOD SPEC: CPT

## 2022-11-11 PROCEDURE — 85610 PROTHROMBIN TIME: CPT

## 2022-11-11 RX ORDER — METOLAZONE 2.5 MG/1
TABLET ORAL
Qty: 180 TABLET | Refills: 2 | Status: SHIPPED | OUTPATIENT
Start: 2022-11-11 | End: 2023-07-14

## 2022-11-11 RX ORDER — LEVOTHYROXINE SODIUM 175 UG/1
TABLET ORAL
Qty: 90 TABLET | Refills: 2 | Status: SHIPPED | OUTPATIENT
Start: 2022-11-11 | End: 2023-08-14

## 2022-11-11 RX ORDER — POTASSIUM CHLORIDE 750 MG/1
TABLET, EXTENDED RELEASE ORAL
Qty: 90 TABLET | Refills: 0 | Status: SHIPPED | OUTPATIENT
Start: 2022-11-11 | End: 2023-05-03

## 2022-11-11 NOTE — PROGRESS NOTES
ANTICOAGULATION MANAGEMENT     Erich Craven 82 year old male is on warfarin with subtherapeutic INR result. (Goal INR 2.0-3.0)    Recent labs: (last 7 days)     11/11/22  1352   INR 1.7*       ASSESSMENT       Source(s): Chart Review    Previous INR was Therapeutic last visit; previously outside of goal range    Medication, diet, health changes since last INR chart reviewed; none identified           PLAN     Unable to reach Jaun today.    Left message to take a booster dose of warfarin,  7.5 mg tonight and then continue normal dosing of 5 mg over the weekend. Request call back for assessment.    Follow up required to confirm warfarin dose taken and assess for changes    Roxanna Garcia, RN  Anticoagulation Clinic  11/11/2022

## 2022-11-11 NOTE — PROGRESS NOTES
ANTICOAGULATION MANAGEMENT     Erich Craven 82 year old male is on warfarin with subtherapeutic INR result. (Goal INR 2.0-3.0)    Recent labs: (last 7 days)     11/11/22  1352   INR 1.7*       ASSESSMENT       Source(s): Chart Review    Previous INR was Therapeutic last visit; previously outside of goal range    Medication, diet, health changes since last INR chart reviewed; none identified           PLAN     Unable to reach Jaun today.    Left message to give us a call back    Follow up required to confirm warfarin dose taken and assess for changes    Roxanna Garcia, RN  Anticoagulation Clinic  11/11/2022

## 2022-11-11 NOTE — TELEPHONE ENCOUNTER
"Routing refill request to provider for review/approval because:  Drug interaction warning    High  Drug-Drug: potassium chloride ER and spironolactone  Co-administration of Potassium Preparations and Potassium-Sparing Diuretics may cause hyperkalemia, possibly leading to cardiac arrhythmias or cardiac arrest.  Details         Requested Prescriptions   Pending Prescriptions Disp Refills     potassium chloride ER (KLOR-CON M) 10 MEQ CR tablet [Pharmacy Med Name: POTASSIUM CL MICRO 10MEQ ER TABS] 90 tablet 0     Sig: TAKE 1 TABLET(10 MEQ) BY MOUTH DAILY       Potassium Supplements Protocol Passed - 11/10/2022  6:46 AM        Passed - Recent (12 mo) or future (30 days) visit within the authorizing provider's department     Patient has had an office visit with the authorizing provider or a provider within the authorizing providers department within the previous 12 mos or has a future within next 30 days. See \"Patient Info\" tab in inbasket, or \"Choose Columns\" in Meds & Orders section of the refill encounter.              Passed - Medication is active on med list        Passed - Patient is age 18 or older        Passed - Normal serum potassium in past 12 months     Recent Labs   Lab Test 10/18/22  0939   POTASSIUM 3.5                       Keisha A., Triage RN  [facility]     "

## 2022-11-14 NOTE — PROGRESS NOTES
ANTICOAGULATION MANAGEMENT     Erich Craven 82 year old male is on warfarin with subtherapeutic INR result. (Goal INR 2.0-3.0)    Recent labs: (last 7 days)     11/11/22  1352   INR 1.7*       ASSESSMENT       Source(s): Chart Review and Patient/Caregiver Call       Warfarin doses taken: Warfarin taken as instructed    Diet: No new diet changes identified    New illness, injury, or hospitalization: No    Medication/supplement changes: None noted    Signs or symptoms of bleeding or clotting: No    Previous INR: Therapeutic last visit; previously outside of goal range    Additional findings: Multiple attempts to reach patient by phone/discharged from Home care       PLAN     Recommended plan for no diet, medication or health factor changes affecting INR     Dosing Instructions: booster dose then continue your current warfarin dose with next INR in 1 week       Summary  As of 11/11/2022    Full warfarin instructions:  11/11: 7.5 mg; Otherwise 5 mg every day; Starting 11/11/2022   Next INR check:  11/18/2022             Detailed voice message left for Jaun with dosing instructions and follow up date.   Sent Netatmo message with dosing and follow up instructions    Contact [phone]  to schedule and with any changes, questions or concerns.     Education provided:     Please call back if any changes to your diet, medications or how you've been taking warfarin    Plan made per [facility] anticoagulation protocol    Roxanna Garcia, RN  Anticoagulation Clinic  11/14/2022    _______________________________________________________________________     Anticoagulation Episode Summary     Current INR goal:  2.0-3.0   TTR:  52.4 % (1 y)   Target end date:  Indefinite   Send INR reminders to:  [facility]    Indications    PE (pulmonary thromboembolism) (H) [I26.99]  Paroxysmal A-fib (H) [I48.0]  Deep vein thrombosis (DVT) of distal vein of lower extremity  unspecified chronicity  unspecified laterality (H) [I82.4Z9]  Long term  current use of anticoagulants with INR goal of 2.0-3.0 [Z79.01]           Comments:           Anticoagulation Care Providers     Provider Role Specialty Phone number    Dontrell Gómez MD Referring Internal Medicine [phone]    Anne Heath MD Referring Internal Medicine [phone]

## 2022-11-14 NOTE — TELEPHONE ENCOUNTER
Prior Authorization Approval    Authorization Effective Date: 11/11/2022  Authorization Expiration Date: 11/8/2023  Medication: traMADol (ULTRAM) 50 MG tablet-APPROVED  Approved Dose/Quantity:   Reference #:     Insurance Company: [employer] - Phone [phone] Fax [phone]  Expected CoPay:       CoPay Card Available:      Foundation Assistance Needed:    Which Pharmacy is filling the prescription (Not needed for infusion/clinic administered): [id number] [place], [place] - [address]  Pharmacy Notified: Yes-Pharmacy will notify patient when ready.  Patient Notified: No

## 2022-11-15 DIAGNOSIS — I10 ESSENTIAL HYPERTENSION: ICD-10-CM

## 2022-11-15 RX ORDER — DILTIAZEM HYDROCHLORIDE 300 MG/1
300 CAPSULE, COATED, EXTENDED RELEASE ORAL DAILY
Qty: 90 CAPSULE | Refills: 0 | Status: SHIPPED | OUTPATIENT
Start: 2022-11-15 | End: 2022-11-16

## 2022-11-15 ASSESSMENT — ENCOUNTER SYMPTOMS
JOINT SWELLING: 0
STIFFNESS: 1
ARTHRALGIAS: 1
NECK PAIN: 1
MUSCLE CRAMPS: 0
BACK PAIN: 0
MUSCLE WEAKNESS: 0
MYALGIAS: 0

## 2022-11-16 ENCOUNTER — OFFICE VISIT (OUTPATIENT)
Dept: ORTHOPEDICS | Facility: CLINIC | Age: 82
End: 2022-11-16
Attending: ANESTHESIOLOGY
Payer: MEDICARE

## 2022-11-16 ENCOUNTER — OFFICE VISIT (OUTPATIENT)
Dept: CARDIOLOGY | Facility: CLINIC | Age: 82
End: 2022-11-16
Payer: MEDICARE

## 2022-11-16 ENCOUNTER — ANCILLARY PROCEDURE (OUTPATIENT)
Dept: GENERAL RADIOLOGY | Facility: CLINIC | Age: 82
End: 2022-11-16
Attending: ORTHOPAEDIC SURGERY
Payer: MEDICARE

## 2022-11-16 ENCOUNTER — TELEPHONE (OUTPATIENT)
Dept: ORTHOPEDICS | Facility: CLINIC | Age: 82
End: 2022-11-16

## 2022-11-16 ENCOUNTER — ANCILLARY PROCEDURE (OUTPATIENT)
Dept: CARDIOLOGY | Facility: CLINIC | Age: 82
End: 2022-11-16
Attending: INTERNAL MEDICINE
Payer: MEDICARE

## 2022-11-16 ENCOUNTER — PRE VISIT (OUTPATIENT)
Dept: ORTHOPEDICS | Facility: CLINIC | Age: 82
End: 2022-11-16

## 2022-11-16 VITALS
DIASTOLIC BLOOD PRESSURE: 84 MMHG | HEART RATE: 86 BPM | SYSTOLIC BLOOD PRESSURE: 136 MMHG | BODY MASS INDEX: 35.59 KG/M2 | WEIGHT: 248.6 LBS | OXYGEN SATURATION: 94 % | HEIGHT: 70 IN

## 2022-11-16 VITALS — HEIGHT: 70 IN | WEIGHT: 249 LBS | BODY MASS INDEX: 35.65 KG/M2

## 2022-11-16 DIAGNOSIS — E78.49 OTHER HYPERLIPIDEMIA: ICD-10-CM

## 2022-11-16 DIAGNOSIS — I48.0 PAROXYSMAL ATRIAL FIBRILLATION (H): ICD-10-CM

## 2022-11-16 DIAGNOSIS — Z95.0 CARDIAC PACEMAKER IN SITU: ICD-10-CM

## 2022-11-16 DIAGNOSIS — I25.10 CORONARY ARTERY DISEASE INVOLVING NATIVE CORONARY ARTERY OF NATIVE HEART WITHOUT ANGINA PECTORIS: ICD-10-CM

## 2022-11-16 DIAGNOSIS — M75.102 LEFT ROTATOR CUFF TEAR ARTHROPATHY: Primary | ICD-10-CM

## 2022-11-16 DIAGNOSIS — I49.5 SICK SINUS SYNDROME (H): ICD-10-CM

## 2022-11-16 DIAGNOSIS — I50.30 HEART FAILURE WITH PRESERVED EJECTION FRACTION, UNSPECIFIED HF CHRONICITY (H): ICD-10-CM

## 2022-11-16 DIAGNOSIS — M12.812 LEFT ROTATOR CUFF TEAR ARTHROPATHY: Primary | ICD-10-CM

## 2022-11-16 DIAGNOSIS — I10 ESSENTIAL HYPERTENSION: ICD-10-CM

## 2022-11-16 DIAGNOSIS — R60.0 BILATERAL LOWER EXTREMITY EDEMA: ICD-10-CM

## 2022-11-16 DIAGNOSIS — I48.0 PAROXYSMAL A-FIB (H): Primary | ICD-10-CM

## 2022-11-16 DIAGNOSIS — M25.512 LEFT SHOULDER PAIN, UNSPECIFIED CHRONICITY: ICD-10-CM

## 2022-11-16 PROCEDURE — 93280 PM DEVICE PROGR EVAL DUAL: CPT | Performed by: INTERNAL MEDICINE

## 2022-11-16 PROCEDURE — 99214 OFFICE O/P EST MOD 30 MIN: CPT | Mod: GC | Performed by: ORTHOPAEDIC SURGERY

## 2022-11-16 PROCEDURE — 99214 OFFICE O/P EST MOD 30 MIN: CPT | Performed by: INTERNAL MEDICINE

## 2022-11-16 PROCEDURE — 73030 X-RAY EXAM OF SHOULDER: CPT | Mod: LT | Performed by: RADIOLOGY

## 2022-11-16 RX ORDER — DILTIAZEM HYDROCHLORIDE 300 MG/1
300 CAPSULE, COATED, EXTENDED RELEASE ORAL DAILY
Qty: 90 CAPSULE | Refills: 3 | Status: ON HOLD | OUTPATIENT
Start: 2022-11-16 | End: 2023-02-24

## 2022-11-16 RX ORDER — ISOSORBIDE MONONITRATE 30 MG/1
30 TABLET, EXTENDED RELEASE ORAL DAILY
Qty: 90 TABLET | Refills: 3 | Status: SHIPPED | OUTPATIENT
Start: 2022-11-16 | End: 2023-04-19

## 2022-11-16 NOTE — NURSING NOTE
Pre-Operative Teaching Flowsheet     Person(s) involved in teaching: Patient     Motivation Level:  Receptive (willing/able to accept information) and asks appropriate questions where applicable: Yes  Any cultural factors/Mormon beliefs that may influence understanding or compliance? No     Patient demonstrates understanding of the following:  Pre-operative planning, including the necessary appointments and preparation needed prior to surgery: Yes  Which situations necessitate calling provider and whom to contact: Yes  Pain management techniques pre and post op: Yes  Stoplight tool introduced, questions answered, patient expressed understanding: Yes  How, and when, to access community resources: Yes    Additional Teaching Concerns Addressed:  Post-operative living arrangements and necessary adaptations to living environment.  Instructional Materials Used/Given: Yes, pre-op packet given to patient with additional system forms added as needed depending on type of surgery. Pre-op soap given (if in clinic).     Pt would like stay in a TCU after surgery and so we did discuss that we do not advise having elective surgery if he doesn't have the ability to go home or stay with family/friends. He understood and will work on having someone available with him (he mentioned his son).     Time spent with patient: 20 minutes.    Gregg Tellez RN

## 2022-11-16 NOTE — TELEPHONE ENCOUNTER
Pt will need lab order placed - pended BMP     Keisha MARTIN, Triage RN  [facility]

## 2022-11-16 NOTE — PROGRESS NOTES
HPI and Plan:   See dictation          Orders Placed This Encounter   Procedures     Basic metabolic panel     Lipid Profile     ALT     Follow-Up with Cardiology     Echocardiogram Complete       Orders Placed This Encounter   Medications     diltiazem ER COATED BEADS (CARDIZEM CD/CARTIA XT) 300 MG 24 hr capsule     Sig: Take 1 capsule (300 mg) by mouth daily     Dispense:  90 capsule     Refill:  3     isosorbide mononitrate (IMDUR) 30 MG 24 hr tablet     Sig: Take 1 tablet (30 mg) by mouth daily     Dispense:  90 tablet     Refill:  3       Medications Discontinued During This Encounter   Medication Reason     isosorbide mononitrate (IMDUR) 30 MG 24 hr tablet Reorder     diltiazem ER COATED BEADS (CARDIZEM CD/CARTIA XT) 300 MG 24 hr capsule Reorder         Encounter Diagnoses   Name Primary?     Paroxysmal A-fib (H) Yes     Coronary artery disease involving native coronary artery of native heart without angina pectoris      Essential hypertension      Heart failure with preserved ejection fraction, unspecified HF chronicity (H)      Cardiac pacemaker in situ      Sick sinus syndrome (H)      Paroxysmal atrial fibrillation (H)      Other hyperlipidemia      Bilateral lower extremity edema        CURRENT MEDICATIONS:  Current Outpatient Medications   Medication Sig Dispense Refill     acetaminophen (TYLENOL) 500 MG tablet Take 1,000 mg by mouth 3 times daily       Acetylcarnitine HCl (ACETYL L-CARNITINE PO) Take 1 capsule by mouth daily       albuterol (PROAIR HFA/PROVENTIL HFA/VENTOLIN HFA) 108 (90 Base) MCG/ACT inhaler Inhale 2 puffs into the lungs 4 times daily as needed for shortness of breath / dyspnea or wheezing       aspirin (ASA) 81 MG chewable tablet Take 1 tablet by mouth daily       atorvastatin (LIPITOR) 40 MG tablet Take 1 tablet (40 mg) by mouth every evening 90 tablet 3     diltiazem ER COATED BEADS (CARDIZEM CD/CARTIA XT) 300 MG 24 hr capsule Take 1 capsule (300 mg) by mouth daily 90 capsule 3      Fluticasone-Umeclidin-Vilanterol (TRELEGY ELLIPTA) 200-62.5-25 MCG/INH oral inhaler Inhale 1 puff into the lungs daily       furosemide (LASIX) 20 MG tablet Take 1 tablet (20 mg) by mouth every evening 30 tablet 0     furosemide (LASIX) 40 MG tablet Take 1 tablet (40 mg) by mouth every morning 90 tablet 1     isosorbide mononitrate (IMDUR) 30 MG 24 hr tablet Take 1 tablet (30 mg) by mouth daily 90 tablet 3     levothyroxine (SYNTHROID/LEVOTHROID) 175 MCG tablet TAKE 1 TABLET(175 MCG) BY MOUTH DAILY 90 tablet 2     lisinopril (ZESTRIL) 20 MG tablet Take 0.5 tablets (10 mg) by mouth daily 30 tablet 0     metolazone (ZAROXOLYN) 2.5 MG tablet TAKE 1 TABLET(2.5 MG) BY MOUTH TWICE DAILY 180 tablet 2     Multiple vitamin TABS Take 1 tablet by mouth daily       polyethylene glycol (MIRALAX) 17 g packet Take 1 packet by mouth daily       potassium chloride ER (KLOR-CON M) 10 MEQ CR tablet TAKE 1 TABLET(10 MEQ) BY MOUTH DAILY 90 tablet 0     spironolactone (ALDACTONE) 25 MG tablet Take 1 tablet (25 mg) by mouth every other day 20 tablet 0     tamsulosin (FLOMAX) 0.4 MG capsule Take 1 capsule (0.4 mg) by mouth daily 30 capsule 0     TAURINE PO Take 1 capsule by mouth daily       traMADol (ULTRAM) 50 MG tablet Take 0.5 tablets (25 mg) by mouth 2 times daily as needed for severe pain 30 tablet 0     venlafaxine (EFFEXOR) 75 MG tablet Take 1 tablet (75 mg) by mouth 2 times daily 180 tablet 1     warfarin ANTICOAGULANT (COUMADIN) 1 MG tablet Take 1.5 mg by mouth daily         ALLERGIES     Allergies   Allergen Reactions     No Known Allergies        PAST MEDICAL HISTORY:  Past Medical History:   Diagnosis Date     (HFpEF) heart failure with preserved ejection fraction (H)      Arthritis      BPH with urinary obstruction      CAD (coronary artery disease)     PCI w/ SUMMER in 2014     Cardiac pacemaker in situ      Chronic diastolic heart failure (H)      Complication of anesthesia      COPD (chronic obstructive pulmonary disease) (H)       Diabetes (H)      DVT (deep venous thrombosis) (H)      Dyspnea      Essential hypertension      Fatigue      HLD (hyperlipidemia)      Hx of long term use of blood thinners     Due to PE. Started in 2012     Hypothyroidism      Lymphedema      Mumps      Obesity, Class III, BMI 40-49.9 (morbid obesity) (H)      RIN (obstructive sleep apnea)     has refused CPAP     Pacemaker      Paroxysmal A-fib (H)      PE (pulmonary thromboembolism) (H)        PAST SURGICAL HISTORY:  Past Surgical History:   Procedure Laterality Date     cardiac stenting       CV RIGHT HEART CATH MEASUREMENTS RECORDED N/A 11/5/2019    Procedure: Right Heart Cath;  Surgeon: Roberto Hernandez MD;  [facility]     ENT SURGERY      tonsillectomy     IMPLANT PACEMAKER       INJECT NERVE BLOCK SUPRASCAPULAR Bilateral 8/26/2021    Procedure: BLOCK, NERVE, SUPRASCAPULAR, Bilateral, under ultrasound guidance;  Surgeon: Swetha Allen MD;  Location: [facility]     INJECT NERVE BLOCK SUPRASCAPULAR Bilateral 9/23/2021    Procedure: bilateral suprascapular nerve block;  Surgeon: Swetha Allen MD;  Location: [facility] OR     INJECT NERVE BLOCK SUPRASCAPULAR Left 9/15/2022    Procedure: BLOCK, NERVE, SUPRASCAPULAR- left;  Surgeon: Swetha Allen MD;  Location: [facility]     INJECT NERVE BLOCK SUPRASCAPULAR Left 10/20/2022    Procedure: BLOCK, NERVE, SUPRASCAPULAR- left;  Surgeon: Swetha Allen MD;  Location: [facility] OR     NERVE BLOCK PERIPHERAL Bilateral 3/10/2022    Procedure: bilateral suprascapular nerve block;  Surgeon: Swetha Allen MD;  Location: [facility] OR     ORTHOPEDIC SURGERY Right 2012    knee replacement       FAMILY HISTORY:  Family History   Problem Relation Age of Onset     Hypertension Father        SOCIAL HISTORY:  Social History     Socioeconomic History     Marital status: Single     Spouse name: None     Number of children: None     Years of education: None     Highest education level: None   Tobacco Use     Smoking  "status: Former     Packs/day: 0.50     Years: 25.00     Pack years: 12.50     Types: Cigarettes     Start date:      Quit date:      Years since quittin.8     Smokeless tobacco: Never   Substance and Sexual Activity     Alcohol use: No     Comment: quit in ; recovering     Drug use: No     Sexual activity: Not Currently     Partners: Female       Review of Systems:  Skin:          Eyes:         ENT:         Respiratory:          Cardiovascular:         Gastroenterology:        Genitourinary:         Musculoskeletal:         Neurologic:         Psychiatric:         Heme/Lymph/Imm:         Endocrine:           Physical Exam:  Vitals: /84   Pulse 86   Ht 1.778 m (5' 10\")   Wt 112.8 kg (248 lb 9.6 oz)   SpO2 94%   BMI 35.67 kg/m      Constitutional:  cooperative, alert and oriented, well developed, well nourished, in no acute distress        Skin:  warm and dry to the touch venous stasis changes        Head:  no masses or lesions        Eyes:  pupils equal and round        Lymph:No Cervical lymphadenopathy present     ENT:  no pallor or cyanosis        Neck:  carotid pulses are full and equal bilaterally, JVP normal, no carotid bruit        Respiratory:  normal breath sounds, clear to auscultation, normal A-P diameter, normal symmetry, normal respiratory excursion, no use of accessory muscles         Cardiac: regular rhythm, normal S1/S2, no S3 or S4, apical impulse not displaced, no murmurs, gallops or rubs   distant heart sounds            pulses full and equal, no bruits auscultated                                        GI:    obese      Extremities and Muscular Skeletal:  no deformities, clubbing, cyanosis, erythema observed stasis pigmentation bilateral LE edema;trace;pitting          Neurological:  no gross motor deficits;affect appropriate        Psych:  Alert and Oriented x 3        CC  No referring provider defined for this encounter.              "

## 2022-11-16 NOTE — Clinical Note
11/16/2022         RE: Erich Craven  [address]  [place] [address]        Dear Colleague,    Thank you for referring your patient, Erich Craven, to the [facility] [place]. Please see a copy of my visit note below.    CHIEF CONCERN:  ***    HISTORY OF PRESENT ILLNESS:  ***    Past Medical History:   Diagnosis Date     (HFpEF) heart failure with preserved ejection fraction (H)      Arthritis      BPH with urinary obstruction      CAD (coronary artery disease)     PCI w/ SUMMER in 2014     Cardiac pacemaker in situ      Chronic diastolic heart failure (H)      Complication of anesthesia      COPD (chronic obstructive pulmonary disease) (H)      Diabetes (H)      DVT (deep venous thrombosis) (H)      Dyspnea      Essential hypertension      Fatigue      HLD (hyperlipidemia)      Hx of long term use of blood thinners     Due to PE. Started in 2012     Hypothyroidism      Lymphedema      Mumps      Obesity, Class III, BMI 40-49.9 (morbid obesity) (H)      RIN (obstructive sleep apnea)     has refused CPAP     Pacemaker      Paroxysmal A-fib (H)      PE (pulmonary thromboembolism) (H)        Past Surgical History:   Procedure Laterality Date     cardiac stenting       CV RIGHT HEART CATH MEASUREMENTS RECORDED N/A 11/5/2019    Procedure: Right Heart Cath;  Surgeon: Roberto Hernandez MD;  Location:  [facility]ARD[facility] C[facility]     ENT SURGERY      tonsillectomy     IMPLANT PACEMAKER       INJECT NERVE BLOCK SUPRASCAPULAR Bilateral 8/26/2021    Procedure: BLOCK, NERVE, SUPRASCAPULAR, Bilateral, under ultrasound guidance;  Surgeon: Swetha Allen MD;  Location: [facility] OR     INJECT NERVE BLOCK SUPRASCAPULAR Bilateral 9/23/2021    Procedure: bilateral suprascapular nerve block;  Surgeon: Swetha Allen MD;  Location: [facility] OR     INJECT NERVE BLOCK SUPRASCAPULAR Left 9/15/2022    Procedure: BLOCK, NERVE, SUPRASCAPULAR- left;  Surgeon: Swetha Allen MD;  Location: [facility] OR      INJECT NERVE BLOCK SUPRASCAPULAR Left 10/20/2022    Procedure: BLOCK, NERVE, SUPRASCAPULAR- left;  Surgeon: Swetha Allen MD;  Location: [facility] OR     NERVE BLOCK PERIPHERAL Bilateral 3/10/2022    Procedure: bilateral suprascapular nerve block;  Surgeon: Swetha Allen MD;  Location: [facility]     ORTHOPEDIC SURGERY Right 2012    knee replacement       Current Outpatient Medications   Medication Sig Dispense Refill     acetaminophen (TYLENOL) 500 MG tablet Take 1,000 mg by mouth 3 times daily       Acetylcarnitine HCl (ACETYL L-CARNITINE PO) Take 1 capsule by mouth daily       albuterol (PROAIR HFA/PROVENTIL HFA/VENTOLIN HFA) 108 (90 Base) MCG/ACT inhaler Inhale 2 puffs into the lungs 4 times daily as needed for shortness of breath / dyspnea or wheezing (Patient not taking: Reported on 10/27/2022)       aspirin (ASA) 81 MG chewable tablet Take 1 tablet by mouth daily       atorvastatin (LIPITOR) 40 MG tablet Take 1 tablet (40 mg) by mouth every evening 90 tablet 3     diltiazem ER COATED BEADS (CARDIZEM CD/CARTIA XT) 300 MG 24 hr capsule Take 1 capsule (300 mg) by mouth daily 90 capsule 0     Fluticasone-Umeclidin-Vilanterol (TRELEGY ELLIPTA) 200-62.5-25 MCG/INH oral inhaler Inhale 1 puff into the lungs daily       furosemide (LASIX) 20 MG tablet Take 1 tablet (20 mg) by mouth every evening 30 tablet 0     furosemide (LASIX) 40 MG tablet Take 1 tablet (40 mg) by mouth every morning 90 tablet 1     isosorbide mononitrate (IMDUR) 30 MG 24 hr tablet Take 1 tablet (30 mg) by mouth daily 90 tablet 0     levothyroxine (SYNTHROID/LEVOTHROID) 175 MCG tablet TAKE 1 TABLET(175 MCG) BY MOUTH DAILY 90 tablet 2     lisinopril (ZESTRIL) 20 MG tablet Take 0.5 tablets (10 mg) by mouth daily 30 tablet 0     metolazone (ZAROXOLYN) 2.5 MG tablet TAKE 1 TABLET(2.5 MG) BY MOUTH TWICE DAILY 180 tablet 2     Multiple vitamin TABS Take 1 tablet by mouth daily       polyethylene glycol (MIRALAX) 17 g packet Take 1 packet by mouth daily        potassium chloride ER (KLOR-CON M) 10 MEQ CR tablet TAKE 1 TABLET(10 MEQ) BY MOUTH DAILY 90 tablet 0     spironolactone (ALDACTONE) 25 MG tablet Take 1 tablet (25 mg) by mouth every other day 20 tablet 0     tamsulosin (FLOMAX) 0.4 MG capsule Take 1 capsule (0.4 mg) by mouth daily 30 capsule 0     TAURINE PO Take 1 capsule by mouth daily       traMADol (ULTRAM) 50 MG tablet Take 0.5 tablets (25 mg) by mouth 2 times daily as needed for severe pain 30 tablet 0     venlafaxine (EFFEXOR) 75 MG tablet Take 1 tablet (75 mg) by mouth 2 times daily 180 tablet 1     warfarin ANTICOAGULANT (COUMADIN) 1 MG tablet Take 1.5 mg by mouth daily            Allergies   Allergen Reactions     No Known Allergies        SOCIAL HISTORY:    Social History     Socioeconomic History     Marital status: Single     Spouse name: Not on file     Number of children: Not on file     Years of education: Not on file     Highest education level: Not on file   Occupational History     Not on file   Tobacco Use     Smoking status: Former     Packs/day: 0.50     Years: 25.00     Pack years: 12.50     Types: Cigarettes     Smokeless tobacco: Never     Tobacco comments:     quit in 1989   Substance and Sexual Activity     Alcohol use: 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; recovering     Drug use: No     Sexual activity: Not Currently     Partners: Female   Other Topics Concern     Parent/sibling w/ CABG, MI or angioplasty before 65F 55M? Not Asked   Social History Narrative     Not on file     Social Determinants of Health     Financial Resource Strain: Not on file   Food Insecurity: Not on file   Transportation Needs: Not on file   Physical Activity: Not on file   Stress: Not on file   Social Connections: Not on file   Intimate Partner Violence: Not on file   Housing Stability: Not on file       FAMILY HISTORY: Reviewed in EMR      REVIEW OF SYSTEMS: Positive for that noted in past medical history and history of present illness and otherwise reviewed in  EMR     PHYSICAL EXAM:    ***    IMAGING:  ***    ASSESSMENT:    1. Left end stage glenohumeral arthritis  2. A fib on Coumadin  3. History of provoked DVT and PE  4.     PLAN:  ***    Katelyn Vasquez MD          Again, thank you for allowing me to participate in the care of your patient.        Sincerely,        Katelyn Vasquez MD

## 2022-11-16 NOTE — NURSING NOTE
"Reason For Visit:   Chief Complaint   Patient presents with     Consult     Chronic left shoulder pain/ Swetha Allen MD in [facility]/ Referral to Dr. Vasquez. Second opinion regarding left shoulder surgery. Patient is scheduled with [facility] in February 2023 but wants another opinion, was having trouble with getting aftercare / medicare        PCP: Anne Heath  Ref:     ?  No  Occupation Retired .  Currently working? No.  Work status?  Retired.  Date of injury: NA  Type of injury: NA.  Date of surgery: NA  Type of surgery: NA.  Smoker: No  Request smoking cessation information: No    Right hand dominant    SANE score  Affected shoulder: Right  Right shoulder SANE: 100  Left shoulder SANE: 50    Ht 1.778 m (5' 10\")   Wt 112.9 kg (249 lb)   BMI 35.73 kg/m      Pain Assessment  Patient Currently in Pain: Yes  0-10 Pain Scale: 4 (patient states afternoon is worse than the morning)    Malik Douglas, EMT        "

## 2022-11-16 NOTE — LETTER
11/16/2022    Anne Heath MD  [address]  [place] [address]    RE: Erich Craven       Dear Colleague,     I had the pleasure of seeing Erich Craven in the [facility].  HPI and Plan:   See dictation          Orders Placed This Encounter   Procedures     Basic metabolic panel     Lipid Profile     ALT     Follow-Up with Cardiology     Echocardiogram Complete       Orders Placed This Encounter   Medications     diltiazem ER COATED BEADS (CARDIZEM CD/CARTIA XT) 300 MG 24 hr capsule     Sig: Take 1 capsule (300 mg) by mouth daily     Dispense:  90 capsule     Refill:  3     isosorbide mononitrate (IMDUR) 30 MG 24 hr tablet     Sig: Take 1 tablet (30 mg) by mouth daily     Dispense:  90 tablet     Refill:  3       Medications Discontinued During This Encounter   Medication Reason     isosorbide mononitrate (IMDUR) 30 MG 24 hr tablet Reorder     diltiazem ER COATED BEADS (CARDIZEM CD/CARTIA XT) 300 MG 24 hr capsule Reorder         Encounter Diagnoses   Name Primary?     Paroxysmal A-fib (H) Yes     Coronary artery disease involving native coronary artery of native heart without angina pectoris      Essential hypertension      Heart failure with preserved ejection fraction, unspecified HF chronicity (H)      Cardiac pacemaker in situ      Sick sinus syndrome (H)      Paroxysmal atrial fibrillation (H)      Other hyperlipidemia      Bilateral lower extremity edema        CURRENT MEDICATIONS:  Current Outpatient Medications   Medication Sig Dispense Refill     acetaminophen (TYLENOL) 500 MG tablet Take 1,000 mg by mouth 3 times daily       Acetylcarnitine HCl (ACETYL L-CARNITINE PO) Take 1 capsule by mouth daily       albuterol (PROAIR HFA/PROVENTIL HFA/VENTOLIN HFA) 108 (90 Base) MCG/ACT inhaler Inhale 2 puffs into the lungs 4 times daily as needed for shortness of breath / dyspnea or wheezing       aspirin (ASA) 81 MG chewable tablet Take 1 tablet by mouth daily       atorvastatin  (LIPITOR) 40 MG tablet Take 1 tablet (40 mg) by mouth every evening 90 tablet 3     diltiazem ER COATED BEADS (CARDIZEM CD/CARTIA XT) 300 MG 24 hr capsule Take 1 capsule (300 mg) by mouth daily 90 capsule 3     Fluticasone-Umeclidin-Vilanterol (TRELEGY ELLIPTA) 200-62.5-25 MCG/INH oral inhaler Inhale 1 puff into the lungs daily       furosemide (LASIX) 20 MG tablet Take 1 tablet (20 mg) by mouth every evening 30 tablet 0     furosemide (LASIX) 40 MG tablet Take 1 tablet (40 mg) by mouth every morning 90 tablet 1     isosorbide mononitrate (IMDUR) 30 MG 24 hr tablet Take 1 tablet (30 mg) by mouth daily 90 tablet 3     levothyroxine (SYNTHROID/LEVOTHROID) 175 MCG tablet TAKE 1 TABLET(175 MCG) BY MOUTH DAILY 90 tablet 2     lisinopril (ZESTRIL) 20 MG tablet Take 0.5 tablets (10 mg) by mouth daily 30 tablet 0     metolazone (ZAROXOLYN) 2.5 MG tablet TAKE 1 TABLET(2.5 MG) BY MOUTH TWICE DAILY 180 tablet 2     Multiple vitamin TABS Take 1 tablet by mouth daily       polyethylene glycol (MIRALAX) 17 g packet Take 1 packet by mouth daily       potassium chloride ER (KLOR-CON M) 10 MEQ CR tablet TAKE 1 TABLET(10 MEQ) BY MOUTH DAILY 90 tablet 0     spironolactone (ALDACTONE) 25 MG tablet Take 1 tablet (25 mg) by mouth every other day 20 tablet 0     tamsulosin (FLOMAX) 0.4 MG capsule Take 1 capsule (0.4 mg) by mouth daily 30 capsule 0     TAURINE PO Take 1 capsule by mouth daily       traMADol (ULTRAM) 50 MG tablet Take 0.5 tablets (25 mg) by mouth 2 times daily as needed for severe pain 30 tablet 0     venlafaxine (EFFEXOR) 75 MG tablet Take 1 tablet (75 mg) by mouth 2 times daily 180 tablet 1     warfarin ANTICOAGULANT (COUMADIN) 1 MG tablet Take 1.5 mg by mouth daily         ALLERGIES     Allergies   Allergen Reactions     No Known Allergies        PAST MEDICAL HISTORY:  Past Medical History:   Diagnosis Date     (HFpEF) heart failure with preserved ejection fraction (H)      Arthritis      BPH with urinary obstruction       CAD (coronary artery disease)     PCI w/ SUMMER in 2014     Cardiac pacemaker in situ      Chronic diastolic heart failure (H)      Complication of anesthesia      COPD (chronic obstructive pulmonary disease) (H)      Diabetes (H)      DVT (deep venous thrombosis) (H)      Dyspnea      Essential hypertension      Fatigue      HLD (hyperlipidemia)      Hx of long term use of blood thinners     Due to PE. Started in 2012     Hypothyroidism      Lymphedema      Mumps      Obesity, Class III, BMI 40-49.9 (morbid obesity) (H)      RIN (obstructive sleep apnea)     has refused CPAP     Pacemaker      Paroxysmal A-fib (H)      PE (pulmonary thromboembolism) (H)        PAST SURGICAL HISTORY:  Past Surgical History:   Procedure Laterality Date     cardiac stenting       CV RIGHT HEART CATH MEASUREMENTS RECORDED N/A 11/5/2019    Procedure: Right Heart Cath;  Surgeon: Roberto Hernandez MD;  Location:  HEART CARDI[facility] CATH LAB     ENT SURGERY      tonsillectomy     IMPLANT PACEMAKER       INJECT NERVE BLOCK SUPRASCAPULAR Bilateral 8/26/2021    Procedure: BLOCK, NERVE, SUPRASCAPULAR, Bilateral, under ultrasound guidance;  Surgeon: Swetha Allen MD;  Location: [facility] OR     INJECT NERVE BLOCK SUPRASCAPULAR Bilateral 9/23/2021    Procedure: bilateral suprascapular nerve block;  Surgeon: Swetha Allen MD;  Location: [facility] OR     INJECT NERVE BLOCK SUPRASCAPULAR Left 9/15/2022    Procedure: BLOCK, NERVE, SUPRASCAPULAR- left;  Surgeon: Swetha Allen MD;  Location: [facility] OR     INJECT NERVE BLOCK SUPRASCAPULAR Left 10/20/2022    Procedure: BLOCK, NERVE, SUPRASCAPULAR- left;  Surgeon: Swetha Allen MD;  Location: [facility] OR     NERVE BLOCK PERIPHERAL Bilateral 3/10/2022    Procedure: bilateral suprascapular nerve block;  Surgeon: Swetha Allen MD;  Location: [facility] OR     ORTHOPEDIC SURGERY Right 2012    knee replacement       FAMILY HISTORY:  Family History   Problem Relation Age of Onset     Hypertension Father        SOCIAL  "HISTORY:  Social History     Socioeconomic History     Marital status: Single     Spouse name: None     Number of children: None     Years of education: None     Highest education level: None   Tobacco Use     Smoking status: Former     Packs/day: 0.50     Years: 25.00     Pack years: 12.50     Types: Cigarettes     Start date:      Quit date:      Years since quittin.8     Smokeless tobacco: Never   Substance and Sexual Activity     Alcohol use: No     Comment: quit in ; recovering     Drug use: No     Sexual activity: Not Currently     Partners: Female       Review of Systems:  Skin:          Eyes:         ENT:         Respiratory:          Cardiovascular:         Gastroenterology:        Genitourinary:         Musculoskeletal:         Neurologic:         Psychiatric:         Heme/Lymph/Imm:         Endocrine:           Physical Exam:  Vitals: /84   Pulse 86   Ht 1.778 m (5' 10\")   Wt 112.8 kg (248 lb 9.6 oz)   SpO2 94%   BMI 35.67 kg/m      Constitutional:  cooperative, alert and oriented, well developed, well nourished, in no acute distress        Skin:  warm and dry to the touch venous stasis changes        Head:  no masses or lesions        Eyes:  pupils equal and round        Lymph:No Cervical lymphadenopathy present     ENT:  no pallor or cyanosis        Neck:  carotid pulses are full and equal bilaterally, JVP normal, no carotid bruit        Respiratory:  normal breath sounds, clear to auscultation, normal A-P diameter, normal symmetry, normal respiratory excursion, no use of accessory muscles         Cardiac: regular rhythm, normal S1/S2, no S3 or S4, apical impulse not displaced, no murmurs, gallops or rubs   distant heart sounds            pulses full and equal, no bruits auscultated                                        GI:    obese      Extremities and Muscular Skeletal:  no deformities, clubbing, cyanosis, erythema observed stasis pigmentation bilateral LE " edema;trace;pitting          Neurological:  no gross motor deficits;affect appropriate        Psych:  Alert and Oriented x 3        CC  No referring provider defined for this encounter.                Service Date: 11/16/2022    HISTORY OF PRESENT ILLNESS:  It was my pleasure to see your patient, Eirch Craven, who is an 82-year-old patient with a past history of coronary artery disease who underwent stenting of the proximal left anterior descending artery and mid and proximal right coronary artery in 2014.  This patient also has a history of paroxysmal atrial fibrillation for which he is chronically anticoagulated with warfarin.  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  He also has significant lower extremity venous disease as well.  This patient in the past had a diagnosis of diastolic congestive heart failure, however, right heart catheterization, which was performed on 11/05/2019, showed that the patient in fact was euvolemic with normal pulmonary artery wedge pressures.     His last assessment of myocardial ischemia was performed in 2019, which was low risk.  A nuclear stress test was performed on 11/18/2021 and this showed a mild degree of ischemia in the basal inferolateral segment and also a small area of nontransmural infarction in the mid to basal inferior wall and inferoseptal segment.  EF was normal at 67% at that time.    With that background in mind, the patient is feeling well.  He has no chest pains, no chest pressure and no unusual shortness of breath.  He has no history of severe obstructive valvular heart disease and this is consistent with the physical examination.  Also, this patient does not have history of malignant dysrhythmias, nor does he have history of decompensated congestive heart failure, nor unstable angina pectoris and based upon this, his risk of an intraoperative cardiac event during his shoulder surgery would  be low.    His last lipid profile was excellent on 08/02/2022 with an LDL of 63, HDL of 48 and triglycerides of 92 with a total cholesterol of 129.  His blood pressure is well controlled today at 136/84 with a pulse of 86 beats per minute.     Interrogation of his permanent pacemaker was last performed today and this showed that the patient's main rhythm is sinus rhythm.  He has had 4 mode switches, 3 of them showing atrial fibrillation with ventricular rates quite well controlled between 100 and 110 and that was only 5 beats of this where he was in the higher heart rate.  He will occasionally have short bursts of atrial fibrillation with a heart rate of 150-160.  As I mentioned, he is already anticoagulated with warfarin.    IMPRESSION:    1.  Coronary artery disease.  The patient is asymptomatic with respect to coronary artery disease with no symptoms suggestive of angina pectoris and a low risk nuclear stress test performed 1 year and 5 months ago.  2.  The patient would be regarded as a low risk for an intraoperative cardiac event given that the patient is not in decompensated heart failure, does not have unstable angina pectoris, does not have severely obstructive valvular heart lesions and does not have any malignant arrhythmias.  3.  Normally functioning permanent pacemaker.  4.  Breakthrough episodes of paroxysmal atrial fibrillation, most of which are short-lived and asymptomatic.  5.  Anticoagulated with Coumadin without complication.  6.  Normotensive.  7.  Excellent lipid profile, well within secondary prevention guidelines.    PLAN:    1.  The patient should take all of his cardiac medications prior to his surgery.  2.  This patient does not need any special monitoring prior to his surgery.  3.  The patient does not require bridging anticoagulation, but his warfarin should be restarted as soon as safely possible after his surgery from a bleeding point of view.    We will see the patient back again in  about 4-6 months' time.  I will repeat his echocardiogram at that stage.    It is my pleasure to be involved the care of this very nice patient.  We look forward to seeing him again.    cc:    Katelyn Vasquez MD   [id number]  [place] [address]        Brent Dixon MD, [facility]      D: 2022   T: 2022   MT:     Name:     KUHSHBOO NINO  MRN:      [id number]        Account:      [id number]   :      1940           Service Date: 2022       Document: [id number]      Thank you for allowing me to participate in the care of your patient.      Sincerely,     Brent Portillo MD, MD     [facility]  cc:   No referring provider defined for this encounter.

## 2022-11-16 NOTE — PROGRESS NOTES
Service Date: 11/16/2022    HISTORY OF PRESENT ILLNESS:  It was my pleasure to see your patient, Erich Craven, who is an 82-year-old patient with a past history of coronary artery disease who underwent stenting of the proximal left anterior descending artery and mid and proximal right coronary artery in 2014.  This patient also has a history of paroxysmal atrial fibrillation for which he is chronically anticoagulated with warfarin.  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  He also has significant lower extremity venous disease as well.  This patient in the past had a diagnosis of diastolic congestive heart failure, however, right heart catheterization, which was performed on 11/05/2019, showed that the patient in fact was euvolemic with normal pulmonary artery wedge pressures.     His last assessment of myocardial ischemia was performed in 2019, which was low risk.  A nuclear stress test was performed on 11/18/2021 and this showed a mild degree of ischemia in the basal inferolateral segment and also a small area of nontransmural infarction in the mid to basal inferior wall and inferoseptal segment.  EF was normal at 67% at that time.    With that background in mind, the patient is feeling well.  He has no chest pains, no chest pressure and no unusual shortness of breath.  He has no history of severe obstructive valvular heart disease and this is consistent with the physical examination.  Also, this patient does not have history of malignant dysrhythmias, nor does he have history of decompensated congestive heart failure, nor unstable angina pectoris and based upon this, his risk of an intraoperative cardiac event during his shoulder surgery would be low.    His last lipid profile was excellent on 08/02/2022 with an LDL of 63, HDL of 48 and triglycerides of 92 with a total cholesterol of 129.  His blood pressure is well controlled today at  136/84 with a pulse of 86 beats per minute.     Interrogation of his permanent pacemaker was last performed today and this showed that the patient's main rhythm is sinus rhythm.  He has had 4 mode switches, 3 of them showing atrial fibrillation with ventricular rates quite well controlled between 100 and 110 and that was only 5 beats of this where he was in the higher heart rate.  He will occasionally have short bursts of atrial fibrillation with a heart rate of 150-160.  As I mentioned, he is already anticoagulated with warfarin.    IMPRESSION:    1.  Coronary artery disease.  The patient is asymptomatic with respect to coronary artery disease with no symptoms suggestive of angina pectoris and a low risk nuclear stress test performed 1 year and 5 months ago.  2.  The patient would be regarded as a low risk for an intraoperative cardiac event given that the patient is not in decompensated heart failure, does not have unstable angina pectoris, does not have severely obstructive valvular heart lesions and does not have any malignant arrhythmias.  3.  Normally functioning permanent pacemaker.  4.  Breakthrough episodes of paroxysmal atrial fibrillation, most of which are short-lived and asymptomatic.  5.  Anticoagulated with Coumadin without complication.  6.  Normotensive.  7.  Excellent lipid profile, well within secondary prevention guidelines.    PLAN:    1.  The patient should take all of his cardiac medications prior to his surgery.  2.  This patient does not need any special monitoring prior to his surgery.  3.  The patient does not require bridging anticoagulation, but his warfarin should be restarted as soon as safely possible after his surgery from a bleeding point of view.    We will see the patient back again in about 4-6 months' time.  I will repeat his echocardiogram at that stage.    It is my pleasure to be involved the care of this very nice patient.  We look forward to seeing him again.    cc:     Katelyn Vasquez MD   [place] [id number]  [place] [address]        Brent Dixon MD, FACC        D: 2022   T: 2022   MT: KARO    Name:     KHUSHBOO NINO  MRN:      [id number]        Account:      [id number]   :      1940           Service Date: 2022       Document: [id number]

## 2022-11-16 NOTE — PROGRESS NOTES
CHIEF CONCERN: Left shoulder pain    HISTORY OF PRESENT This is a very pleasant RHD 82-year-old male with significant past medical history including PE/DVT following total knee surgery in 2012, paroxysmal A. fib status post pacer on chronic Coumadin, hypothyroidism, HFpEF, CAD status post PCI, COPD, who presents for evaluation and discussion of left shoulder arthroplasty in the setting of end-stage glenohumeral arthritis.  Patient previously underwent right reverse total shoulder replacement with Dr. Jeffries at [facility] at 7/6/2022.  He states that he has been doing very well since surgery, with marked improvement in his pain though he continues to have decreased range of motion. Overall very happy with his left side.  Patient was bridged with enoxaparin during this time, did not require TCU placement, and was discharged to his daughter's house which he felt was a stressor on her.    Patient does have a scheduled surgery for reverse total shoulder on the left side with Dr. Jeffries in February 2023 and thus presents today to discuss getting this done here nearer his home in [place].  Has undergone formal physical therapy, last session 1.5 years ago which mildly improved his pain.  Also underwent corticosteroid injections, most recent of which was image guided CSI 1 month ago. This provided 3 to 4 weeks of pain relief but given longstanding pain, impact on his activities of daily living, patient would like to pursue more definitive treatment for his pain.  Denies any numbness or tingling.  No additional concerns at this time.    Past Medical History:   Diagnosis Date     (HFpEF) heart failure with preserved ejection fraction (H)      Arthritis      BPH with urinary obstruction      CAD (coronary artery disease)     PCI w/ SUMMER in 2014     Cardiac pacemaker in situ      Chronic diastolic heart failure (H)      Complication of anesthesia      COPD (chronic obstructive pulmonary disease) (H)      Diabetes (H)      DVT (deep  venous thrombosis) (H)      Dyspnea      Essential hypertension      Fatigue      HLD (hyperlipidemia)      Hx of long term use of blood thinners     Due to PE. Started in 2012     Hypothyroidism      Lymphedema      Mumps      Obesity, Class III, BMI 40-49.9 (morbid obesity) (H)      RIN (obstructive sleep apnea)     has refused CPAP     Pacemaker      Paroxysmal A-fib (H)      PE (pulmonary thromboembolism) (H)        Past Surgical History:   Procedure Laterality Date     cardiac stenting       CV RIGHT HEART CATH MEASUREMENTS RECORDED N/A 11/5/2019    Procedure: Right Heart Cath;  Surgeon: Roberto Hernandez MD;  Location:  HEART CARDIAC CATH LAB     ENT SURGERY      tonsillectomy     IMPLANT PACEMAKER       INJECT NERVE BLOCK SUPRASCAPULAR Bilateral 8/26/2021    Procedure: BLOCK, NERVE, SUPRASCAPULAR, Bilateral, under ultrasound guidance;  Surgeon: Swetha Allen MD;  Location: [facility] OR     INJECT NERVE BLOCK SUPRASCAPULAR Bilateral 9/23/2021    Procedure: bilateral suprascapular nerve block;  Surgeon: Swetha Allen MD;  Location: [facility] OR     INJECT NERVE BLOCK SUPRASCAPULAR Left 9/15/2022    Procedure: BLOCK, NERVE, SUPRASCAPULAR- left;  Surgeon: Swetha Allen MD;  Location: [facility] OR     INJECT NERVE BLOCK SUPRASCAPULAR Left 10/20/2022    Procedure: BLOCK, NERVE, SUPRASCAPULAR- left;  Surgeon: Swetha Allen MD;  Location: [facility] OR     NERVE BLOCK PERIPHERAL Bilateral 3/10/2022    Procedure: bilateral suprascapular nerve block;  Surgeon: Swetha Allen MD;  Location: [facility] OR     ORTHOPEDIC SURGERY Right 2012    knee replacement       Current Outpatient Medications   Medication Sig Dispense Refill     acetaminophen (TYLENOL) 500 MG tablet Take 1,000 mg by mouth 3 times daily       Acetylcarnitine HCl (ACETYL L-CARNITINE PO) Take 1 capsule by mouth daily       albuterol (PROAIR HFA/PROVENTIL HFA/VENTOLIN HFA) 108 (90 Base) MCG/ACT inhaler Inhale 2 puffs into the lungs 4 times daily as needed for shortness  of breath / dyspnea or wheezing (Patient not taking: Reported on 10/27/2022)       aspirin (ASA) 81 MG chewable tablet Take 1 tablet by mouth daily       atorvastatin (LIPITOR) 40 MG tablet Take 1 tablet (40 mg) by mouth every evening 90 tablet 3     diltiazem ER COATED BEADS (CARDIZEM CD/CARTIA XT) 300 MG 24 hr capsule Take 1 capsule (300 mg) by mouth daily 90 capsule 0     Fluticasone-Umeclidin-Vilanterol (TRELEGY ELLIPTA) 200-62.5-25 MCG/INH oral inhaler Inhale 1 puff into the lungs daily       furosemide (LASIX) 20 MG tablet Take 1 tablet (20 mg) by mouth every evening 30 tablet 0     furosemide (LASIX) 40 MG tablet Take 1 tablet (40 mg) by mouth every morning 90 tablet 1     isosorbide mononitrate (IMDUR) 30 MG 24 hr tablet Take 1 tablet (30 mg) by mouth daily 90 tablet 0     levothyroxine (SYNTHROID/LEVOTHROID) 175 MCG tablet TAKE 1 TABLET(175 MCG) BY MOUTH DAILY 90 tablet 2     lisinopril (ZESTRIL) 20 MG tablet Take 0.5 tablets (10 mg) by mouth daily 30 tablet 0     metolazone (ZAROXOLYN) 2.5 MG tablet TAKE 1 TABLET(2.5 MG) BY MOUTH TWICE DAILY 180 tablet 2     Multiple vitamin TABS Take 1 tablet by mouth daily       polyethylene glycol (MIRALAX) 17 g packet Take 1 packet by mouth daily       potassium chloride ER (KLOR-CON M) 10 MEQ CR tablet TAKE 1 TABLET(10 MEQ) BY MOUTH DAILY 90 tablet 0     spironolactone (ALDACTONE) 25 MG tablet Take 1 tablet (25 mg) by mouth every other day 20 tablet 0     tamsulosin (FLOMAX) 0.4 MG capsule Take 1 capsule (0.4 mg) by mouth daily 30 capsule 0     TAURINE PO Take 1 capsule by mouth daily       traMADol (ULTRAM) 50 MG tablet Take 0.5 tablets (25 mg) by mouth 2 times daily as needed for severe pain 30 tablet 0     venlafaxine (EFFEXOR) 75 MG tablet Take 1 tablet (75 mg) by mouth 2 times daily 180 tablet 1     warfarin ANTICOAGULANT (COUMADIN) 1 MG tablet Take 1.5 mg by mouth daily            Allergies   Allergen Reactions     No Known Allergies        SOCIAL HISTORY:     Social History     Socioeconomic History     Marital status: Single     Spouse name: Not on file     Number of children: Not on file     Years of education: Not on file     Highest education level: Not on file   Occupational History     Not on file   Tobacco Use     Smoking status: Former     Packs/day: 0.50     Years: 25.00     Pack years: 12.50     Types: Cigarettes     Smokeless tobacco: Never     Tobacco comments:     quit in 1989   Substance and Sexual Activity     Alcohol use: 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; recovering     Drug use: No     Sexual activity: Not Currently     Partners: Female   Other Topics Concern     Parent/sibling w/ CABG, MI or angioplasty before 65F 55M? Not Asked   Social History Narrative     Not on file     Social Determinants of Health     Financial Resource Strain: Not on file   Food Insecurity: Not on file   Transportation Needs: Not on file   Physical Activity: Not on file   Stress: Not on file   Social Connections: Not on file   Intimate Partner Violence: Not on file   Housing Stability: Not on file     The patient lives independently in an apartment with his dog Finely in [place].  He does not require any assistance with his ADLs but does have access to wound care if needed. Patient has family nearby.    Denies any problems with anesthesia.    FAMILY HISTORY: Reviewed in EMR      REVIEW OF SYSTEMS: Positive for that noted in past medical history and history of present illness and otherwise reviewed in EMR     PHYSICAL EXAM:     Gen: No acute distress. Articulates and communicates with normal affect.  Pulm: Respirations even and unlabored  CV: Extr wwp  MSK: Focused left upper extremity exam:   Skin intact. No erythema or ecchymosis.   Shoulder AROM: FE to 95 (R)/85 (L), ER at side to 40 (R)/35 (L), IR to back pocket (R)/back pocket (L).  Positive Neer and King.  No pain on palpation over the AC joint. No pain on palpation over the long head of the biceps.  Tenderness to palpation  over the anterior joint line, bicipital groove. Sensation intact all dermatomes into the hand to light touch. 2+ radial pulse    IMAGING:  Plain films of the left shoulder complete loss of joint space of the glenohumeral joint.  There is sclerosis of the humeral head as well as glenoid with moderate amount of glenoid wear, no significant medialization.  Loss of joint space between the AC joint with osteophytes located superiorly on the acromion.  Inferior osteophytes also present on the humeral head.     ASSESSMENT:    1. Left end stage glenohumeral arthritis  2. A fib on Coumadin, s/p pacer  3. History of provoked DVT and PE  4. HFpEf    PLAN:  I reviewed the physical exam and imaging findings with the patient. We discussed reasonable expectations of reverse TSA function and that this is the only surgical treatment at this time which offers a reasonable chance for functional improvement.  Had a discussion about the expected postoperative course including admission to the hospital, Occupational Therapy, physical therapy starting postoperative day 1 and discharge either home or to short-term rehab if required.  We will plan for CT of the left shoulder for preoperative planning as well as evaluation with PAC and the patient's cardiologist at [facility].  Similar to his previous procedure, we will plan for bridging with Lovenox while in-house.  Patient expressed his agreement and understanding with the plan.  Questions were answered. Will be in touch with our office regarding scheduling.    Nadiya Watson MD  Orthopaedic Surgery, PGY1    Katelyn Vasquez MD

## 2022-11-17 NOTE — TELEPHONE ENCOUNTER
Date Scheduled: 2-21-23  Facility: [facility]  Surgeon: Dr. Vasquez   Post-op appointment scheduled:    scheduled?: No  Surgery packet/instructions confirmed received?  Yes  Special Considerations:   Malissa Delarosa, Surgery Scheduling Coordinator

## 2022-11-17 NOTE — TELEPHONE ENCOUNTER
Message left for the patient to call back to get surgery scheduled.  Malissa Delarosa, Surgery Scheduling Coordinator

## 2022-11-18 NOTE — TELEPHONE ENCOUNTER
FUTURE VISIT INFORMATION      SURGERY INFORMATION:    Date: 23    Location: [facility] or    Surgeon:  Katelyn Vasquez MD    Anesthesia Type:  choice    Procedure: ARTHROPLASTY, SHOULDER, TOTAL, REVERSE LEFT    Consult: ov     RECORDS REQUESTED FROM:       Primary Care Provider: Anne Heath MD- [facility]    Most recent EKG+ Tracin22    Most recent ECHO: 22    Most recent Cardiac Stress Test: 21

## 2022-11-19 ENCOUNTER — HEALTH MAINTENANCE LETTER (OUTPATIENT)
Age: 82
End: 2022-11-19

## 2022-11-21 DIAGNOSIS — I10 ESSENTIAL HYPERTENSION: ICD-10-CM

## 2022-11-21 RX ORDER — LISINOPRIL 20 MG/1
10 TABLET ORAL DAILY
Qty: 45 TABLET | Refills: 3 | Status: SHIPPED | OUTPATIENT
Start: 2022-11-21 | End: 2023-07-05

## 2022-11-23 LAB
MDC_IDC_LEAD_IMPLANT_DT: NORMAL
MDC_IDC_LEAD_IMPLANT_DT: NORMAL
MDC_IDC_LEAD_LOCATION: NORMAL
MDC_IDC_LEAD_LOCATION: NORMAL
MDC_IDC_LEAD_LOCATION_DETAIL_1: NORMAL
MDC_IDC_LEAD_LOCATION_DETAIL_1: NORMAL
MDC_IDC_LEAD_MFG: NORMAL
MDC_IDC_LEAD_MFG: NORMAL
MDC_IDC_LEAD_MODEL: NORMAL
MDC_IDC_LEAD_MODEL: NORMAL
MDC_IDC_LEAD_POLARITY_TYPE: NORMAL
MDC_IDC_LEAD_POLARITY_TYPE: NORMAL
MDC_IDC_LEAD_SERIAL: NORMAL
MDC_IDC_LEAD_SERIAL: NORMAL
MDC_IDC_MSMT_BATTERY_STATUS: NORMAL
MDC_IDC_MSMT_LEADCHNL_RA_IMPEDANCE_VALUE: 484 OHM
MDC_IDC_MSMT_LEADCHNL_RA_PACING_THRESHOLD_AMPLITUDE: 0.5 V
MDC_IDC_MSMT_LEADCHNL_RA_PACING_THRESHOLD_PULSEWIDTH: 0.4 MS
MDC_IDC_MSMT_LEADCHNL_RA_SENSING_INTR_AMPL: 14.6 MV
MDC_IDC_MSMT_LEADCHNL_RV_IMPEDANCE_VALUE: 428 OHM
MDC_IDC_MSMT_LEADCHNL_RV_PACING_THRESHOLD_AMPLITUDE: 1.6 V
MDC_IDC_MSMT_LEADCHNL_RV_PACING_THRESHOLD_PULSEWIDTH: 0.4 MS
MDC_IDC_MSMT_LEADCHNL_RV_SENSING_INTR_AMPL: 7.8 MV
MDC_IDC_PG_IMPLANT_DTM: NORMAL
MDC_IDC_PG_MFG: NORMAL
MDC_IDC_PG_MODEL: NORMAL
MDC_IDC_PG_SERIAL: NORMAL
MDC_IDC_PG_TYPE: NORMAL
MDC_IDC_SESS_CLINIC_NAME: NORMAL
MDC_IDC_SESS_DTM: NORMAL
MDC_IDC_SESS_TYPE: NORMAL
MDC_IDC_SET_BRADY_AT_MODE_SWITCH_MODE: NORMAL
MDC_IDC_SET_BRADY_AT_MODE_SWITCH_RATE: 150 {BEATS}/MIN
MDC_IDC_SET_BRADY_LOWRATE: 60 {BEATS}/MIN
MDC_IDC_SET_BRADY_MAX_SENSOR_RATE: 130 {BEATS}/MIN
MDC_IDC_SET_BRADY_MAX_TRACKING_RATE: 130 {BEATS}/MIN
MDC_IDC_SET_BRADY_MODE: NORMAL
MDC_IDC_SET_BRADY_PAV_DELAY_HIGH: 220 MS
MDC_IDC_SET_BRADY_PAV_DELAY_LOW: 250 MS
MDC_IDC_SET_BRADY_SAV_DELAY_HIGH: 220 MS
MDC_IDC_SET_BRADY_SAV_DELAY_LOW: 250 MS
MDC_IDC_SET_LEADCHNL_RA_PACING_AMPLITUDE: 2 V
MDC_IDC_SET_LEADCHNL_RA_PACING_CAPTURE_MODE: NORMAL
MDC_IDC_SET_LEADCHNL_RA_PACING_POLARITY: NORMAL
MDC_IDC_SET_LEADCHNL_RA_PACING_PULSEWIDTH: 0.4 MS
MDC_IDC_SET_LEADCHNL_RA_SENSING_ADAPTATION_MODE: NORMAL
MDC_IDC_SET_LEADCHNL_RA_SENSING_POLARITY: NORMAL
MDC_IDC_SET_LEADCHNL_RA_SENSING_SENSITIVITY: 0.5 MV
MDC_IDC_SET_LEADCHNL_RV_PACING_AMPLITUDE: 3.2 V
MDC_IDC_SET_LEADCHNL_RV_PACING_CAPTURE_MODE: NORMAL
MDC_IDC_SET_LEADCHNL_RV_PACING_POLARITY: NORMAL
MDC_IDC_SET_LEADCHNL_RV_PACING_PULSEWIDTH: 0.4 MS
MDC_IDC_SET_LEADCHNL_RV_SENSING_ADAPTATION_MODE: NORMAL
MDC_IDC_SET_LEADCHNL_RV_SENSING_POLARITY: NORMAL
MDC_IDC_SET_LEADCHNL_RV_SENSING_SENSITIVITY: 1 MV
MDC_IDC_SET_ZONE_DETECTION_INTERVAL: 375 MS
MDC_IDC_SET_ZONE_TYPE: NORMAL
MDC_IDC_SET_ZONE_VENDOR_TYPE: NORMAL
MDC_IDC_STAT_EPISODE_RECENT_COUNT: 38
MDC_IDC_STAT_EPISODE_RECENT_COUNT_DTM_END: NORMAL
MDC_IDC_STAT_EPISODE_RECENT_COUNT_DTM_START: NORMAL
MDC_IDC_STAT_EPISODE_TOTAL_COUNT: 510
MDC_IDC_STAT_EPISODE_TOTAL_COUNT_DTM_END: NORMAL
MDC_IDC_STAT_EPISODE_TYPE: NORMAL
MDC_IDC_STAT_EPISODE_TYPE: NORMAL
MDC_IDC_STAT_EPISODE_VENDOR_TYPE: NORMAL
MDC_IDC_STAT_EPISODE_VENDOR_TYPE: NORMAL

## 2022-11-29 ENCOUNTER — TELEPHONE (OUTPATIENT)
Dept: FAMILY MEDICINE | Facility: CLINIC | Age: 82
End: 2022-11-29

## 2022-11-29 NOTE — TELEPHONE ENCOUNTER
ANTICOAGULATION     Erich Craven is overdue for INR check.      Left message for patient to call and schedule lab appointment as soon as possible. If returning call, please schedule.     Trupti Rosas RN

## 2022-12-01 ENCOUNTER — LAB (OUTPATIENT)
Dept: LAB | Facility: CLINIC | Age: 82
End: 2022-12-01
Payer: MEDICARE

## 2022-12-01 ENCOUNTER — ANTICOAGULATION THERAPY VISIT (OUTPATIENT)
Dept: ANTICOAGULATION | Facility: CLINIC | Age: 82
End: 2022-12-01

## 2022-12-01 DIAGNOSIS — Z79.01 LONG TERM CURRENT USE OF ANTICOAGULANTS WITH INR GOAL OF 2.0-3.0: ICD-10-CM

## 2022-12-01 DIAGNOSIS — I82.4Z9 DEEP VEIN THROMBOSIS (DVT) OF DISTAL VEIN OF LOWER EXTREMITY, UNSPECIFIED CHRONICITY, UNSPECIFIED LATERALITY (H): ICD-10-CM

## 2022-12-01 DIAGNOSIS — I48.0 PAROXYSMAL A-FIB (H): ICD-10-CM

## 2022-12-01 DIAGNOSIS — I26.99 PE (PULMONARY THROMBOEMBOLISM) (H): ICD-10-CM

## 2022-12-01 DIAGNOSIS — I26.99 PE (PULMONARY THROMBOEMBOLISM) (H): Primary | ICD-10-CM

## 2022-12-01 LAB — INR BLD: 1.7 (ref 0.9–1.1)

## 2022-12-01 PROCEDURE — 85610 PROTHROMBIN TIME: CPT

## 2022-12-01 PROCEDURE — 36416 COLLJ CAPILLARY BLOOD SPEC: CPT

## 2022-12-01 NOTE — PROGRESS NOTES
ANTICOAGULATION MANAGEMENT     Erich Craven 82 year old male is on warfarin with subtherapeutic INR result. (Goal INR 2.0-3.0)    Recent labs: (last 7 days)     12/01/22  1502   INR 1.7*       ASSESSMENT       Source(s): Chart Review and Patient/Caregiver Call       Warfarin doses taken: Warfarin taken as instructed    Diet: No new diet changes identified    New illness, injury, or hospitalization: No    Medication/supplement changes: None noted    Signs or symptoms of bleeding or clotting: No    Previous INR: Subtherapeutic    Additional findings: Upcoming surgery/procedure Left Total Shoulder Arthroplasty on 2/21/23 - 5 day hold approved per Cardiology - TE sent to [facility] for view       PLAN     Recommended plan for no diet, medication or health factor changes affecting INR     Dosing Instructions: Increase your warfarin dose (7.1% change) with next INR in 2 weeks       Summary  As of 12/1/2022    Full warfarin instructions:  7.5 mg every Thu; 5 mg all other days; Starting 12/1/2022   Next INR check:  12/14/2022             Telephone call with Jaun who verbalizes understanding and agrees to plan    Lab visit scheduled    Education provided:     Please call back if any changes to your diet, medications or how you've been taking warfarin    Symptom monitoring: monitoring for bleeding signs and symptoms and monitoring for clotting signs and symptoms    Plan made per [facility] anticoagulation protocol    Nicole Gaytan RN  Anticoagulation Clinic  12/1/2022    _______________________________________________________________________     Anticoagulation Episode Summary     Current INR goal:  2.0-3.0   TTR:  48.6 % (1 y)   Target end date:  Indefinite   Send INR reminders to:  [facility]    Indications    PE (pulmonary thromboembolism) (H) [I26.99]  Paroxysmal A-fib (H) [I48.0]  Deep vein thrombosis (DVT) of distal vein of lower extremity  unspecified chronicity  unspecified laterality (H) [I82.4Z9]  Long term current  use of anticoagulants with INR goal of 2.0-3.0 [Z79.01]           Comments:           Anticoagulation Care Providers     Provider Role Specialty Phone number    Dontrell Gómez MD Referring Internal Medicine [phone]    Anne Haeth MD Referring Internal Medicine [phone]

## 2022-12-05 ENCOUNTER — TRANSFERRED RECORDS (OUTPATIENT)
Dept: MULTI SPECIALTY CLINIC | Facility: CLINIC | Age: 82
End: 2022-12-05

## 2022-12-05 LAB — RETINOPATHY: NORMAL

## 2022-12-07 ENCOUNTER — MYC REFILL (OUTPATIENT)
Dept: FAMILY MEDICINE | Facility: CLINIC | Age: 82
End: 2022-12-07

## 2022-12-07 ENCOUNTER — MYC MEDICAL ADVICE (OUTPATIENT)
Dept: ANESTHESIOLOGY | Facility: CLINIC | Age: 82
End: 2022-12-07

## 2022-12-07 ENCOUNTER — MYC MEDICAL ADVICE (OUTPATIENT)
Dept: FAMILY MEDICINE | Facility: CLINIC | Age: 82
End: 2022-12-07

## 2022-12-07 DIAGNOSIS — G89.29 CHRONIC LEFT SHOULDER PAIN: ICD-10-CM

## 2022-12-07 DIAGNOSIS — M25.512 CHRONIC LEFT SHOULDER PAIN: ICD-10-CM

## 2022-12-07 DIAGNOSIS — I89.0 LYMPHEDEMA: ICD-10-CM

## 2022-12-07 RX ORDER — SPIRONOLACTONE 25 MG/1
25 TABLET ORAL EVERY OTHER DAY
Qty: 20 TABLET | Refills: 0 | OUTPATIENT
Start: 2022-12-07

## 2022-12-07 RX ORDER — SPIRONOLACTONE 25 MG/1
25 TABLET ORAL EVERY OTHER DAY
Qty: 20 TABLET | Refills: 0 | Status: SHIPPED | OUTPATIENT
Start: 2022-12-07 | End: 2023-01-11

## 2022-12-08 RX ORDER — TRAMADOL HYDROCHLORIDE 50 MG/1
25 TABLET ORAL 2 TIMES DAILY PRN
Qty: 30 TABLET | Refills: 0 | Status: SHIPPED | OUTPATIENT
Start: 2022-12-08 | End: 2023-01-02

## 2022-12-08 ASSESSMENT — ENCOUNTER SYMPTOMS
HEADACHES: 0
PALPITATIONS: 0
EYE PAIN: 0
ARTHRALGIAS: 1
NERVOUS/ANXIOUS: 0
HEMATURIA: 0
DIARRHEA: 0
JOINT SWELLING: 0
CONSTIPATION: 0
HEARTBURN: 0
DYSURIA: 0
ABDOMINAL PAIN: 0
FEVER: 0
DIZZINESS: 1
CHILLS: 0
FREQUENCY: 1
SHORTNESS OF BREATH: 0
SORE THROAT: 0
MYALGIAS: 1
PARESTHESIAS: 0
COUGH: 1
WEAKNESS: 0
NAUSEA: 0
HEMATOCHEZIA: 0

## 2022-12-08 ASSESSMENT — ACTIVITIES OF DAILY LIVING (ADL): CURRENT_FUNCTION: NO ASSISTANCE NEEDED

## 2022-12-08 NOTE — TELEPHONE ENCOUNTER
Refill request    Medication: traMADol (ULTRAM) 50 MG tablet    Sig: Take 0.5 tablets (25 mg) by mouth 2 times daily as needed for severe pain     Dispensed: 30  Refills: 0  SOLD to the pt on: 11/10/22    Last clinic appointment: 8/25/22  Next clinic appointment: Pt reminded to schedule follow up via [employer]. Just had procedure in October.     Last Drug Screen Collected: 9/20/21  Controlled Substance Agreement signed: 9/20/21      Preferred pharmacy:    [id number] - [place], [place] - [address] AT [address] [place] [address]

## 2022-12-13 ENCOUNTER — OFFICE VISIT (OUTPATIENT)
Dept: FAMILY MEDICINE | Facility: CLINIC | Age: 82
End: 2022-12-13
Payer: MEDICARE

## 2022-12-13 ENCOUNTER — ANCILLARY PROCEDURE (OUTPATIENT)
Dept: CT IMAGING | Facility: CLINIC | Age: 82
End: 2022-12-13
Attending: ORTHOPAEDIC SURGERY
Payer: MEDICARE

## 2022-12-13 VITALS
HEIGHT: 70 IN | TEMPERATURE: 97.7 F | DIASTOLIC BLOOD PRESSURE: 78 MMHG | OXYGEN SATURATION: 94 % | BODY MASS INDEX: 35.95 KG/M2 | RESPIRATION RATE: 20 BRPM | HEART RATE: 85 BPM | SYSTOLIC BLOOD PRESSURE: 120 MMHG | WEIGHT: 251.1 LBS

## 2022-12-13 DIAGNOSIS — Z00.00 ENCOUNTER FOR MEDICARE ANNUAL WELLNESS EXAM: Primary | ICD-10-CM

## 2022-12-13 DIAGNOSIS — Z79.899 ENCOUNTER FOR LONG-TERM (CURRENT) USE OF MEDICATIONS: ICD-10-CM

## 2022-12-13 DIAGNOSIS — I48.91 ATRIAL FIBRILLATION, UNSPECIFIED TYPE (H): ICD-10-CM

## 2022-12-13 DIAGNOSIS — I50.32 CHRONIC DIASTOLIC HEART FAILURE (H): ICD-10-CM

## 2022-12-13 DIAGNOSIS — E03.9 HYPOTHYROIDISM, UNSPECIFIED TYPE: ICD-10-CM

## 2022-12-13 DIAGNOSIS — E11.9 TYPE 2 DIABETES MELLITUS WITHOUT COMPLICATION, UNSPECIFIED WHETHER LONG TERM INSULIN USE (H): ICD-10-CM

## 2022-12-13 DIAGNOSIS — N18.32 STAGE 3B CHRONIC KIDNEY DISEASE (H): ICD-10-CM

## 2022-12-13 DIAGNOSIS — M75.102 LEFT ROTATOR CUFF TEAR ARTHROPATHY: ICD-10-CM

## 2022-12-13 DIAGNOSIS — M12.812 LEFT ROTATOR CUFF TEAR ARTHROPATHY: ICD-10-CM

## 2022-12-13 PROCEDURE — G1010 CDSM STANSON: HCPCS

## 2022-12-13 PROCEDURE — 99207 PR FOOT EXAM NO CHARGE: CPT | Performed by: PHYSICIAN ASSISTANT

## 2022-12-13 PROCEDURE — G0439 PPPS, SUBSEQ VISIT: HCPCS | Performed by: PHYSICIAN ASSISTANT

## 2022-12-13 ASSESSMENT — PAIN SCALES - GENERAL: PAINLEVEL: MODERATE PAIN (4)

## 2022-12-13 ASSESSMENT — ACTIVITIES OF DAILY LIVING (ADL): CURRENT_FUNCTION: NO ASSISTANCE NEEDED

## 2022-12-13 NOTE — PROGRESS NOTES
[address] [address]  Phone: [phone]  Fax: [phone]    Eldon Rangel MD        December 13, 2022     Patient: Esthela Lutz   YOB: 1982   Date of Visit: 12/13/2022       To Whom It May Concern: It is my medical opinion that Yong Armenta may return to work on 12/15/2022 with no restrictions. Off work 12/12/2022 and 12/14/2022 due to covid 19. If you have any questions or concerns, please don't hesitate to call.     Sincerely,        Eldon Rangel MD Subjective     Erich Craven is a 80 year old male who presents to clinic today for the following health issues:    HPI         Follow up diastolic heart failure  Patient reports that he is doing relatively well and walking the dog regularly.  His peripheral edema is improving.  2 pillow. Orthopnea, no PND, no angina or palpitations.  Lasix to twice daily, Aldactone every other day and Zaroxolyn twice daily    He continues with attempts for weight reduction although he feels that he is  Stuck at 288 pounds.        Review of Systems   Constitutional, HEENT, cardiovascular, pulmonary, gi and gu-nocturia x2 systems are negative, except as otherwise noted.      Objective    /61 (BP Location: Right arm, Patient Position: Sitting, Cuff Size: Adult Regular)   Pulse 84   Temp 96.9  F (36.1  C) (Temporal)   Ht 1.829 m (6')   Wt 130.6 kg (288 lb)   SpO2 90%   BMI 39.06 kg/m    Body mass index is 39.06 kg/m .  Physical Exam   GENERAL: Morbidly obese but healthy, alert and no distress  NECK: no adenopathy, no asymmetry, masses, or scars and thyroid normal to palpation  RESP: lungs clear to auscultation - no rales, rhonchi or wheezes  CV: regular rate and rhythm,  no murmur, click or rub, 2+ pretibial peripheral edema and peripheral pulses strong  ABDOMEN: soft, nontender, no hepatosplenomegaly, no masses and bowel sounds normal  MS: no gross musculoskeletal defects noted,   Neck veins not evaluatable          Assessment & Plan     Chronic diastolic heart failure (H)  Continue with same for ongoing aggressive diuretic therapy, following closely for avoid prerenal azotemia  - Basic metabolic panel    Morbid obesity (H)  Continue to work on dietary discretion and continue with the same exercise program    Chronic obstructive pulmonary disease, unspecified COPD type (H)  Managing well with current therapy  Acute on chronic heart failure with preserved ejection fraction (H)  As noted above relatively good control,  follow-up lab parameters as noted  - BNP-N terminal pro    Paroxysmal A-fib (H)  Quiesced sent at this time  Hypokalemia  Ongoing serial follow-up    Adjustment disorder with depressed mood  Managing relatively well at this time, no change in medications       BMI:   Estimated body mass index is 39.06 kg/m  as calculated from the following:    Height as of this encounter: 1.829 m (6').    Weight as of this encounter: 130.6 kg (288 lb).            FUTURE APPOINTMENTS:       - Follow-up visit in 1 mo    No follow-ups on file.    Dontrell Góemz MD  [facility]

## 2022-12-13 NOTE — PATIENT INSTRUCTIONS
Patient Education   Personalized Prevention Plan  You are due for the preventive services outlined below.  Your care team is available to assist you in scheduling these services.  If you have already completed any of these items, please share that information with your care team to update in your medical record.  Health Maintenance Due   Topic Date Due     Heart Failure Action Plan  Never done     Diabetic Foot Exam  Never done     Zoster (Shingles) Vaccine (2 of 3) 08/18/2008     Diptheria Tetanus Pertussis (DTAP/TDAP/TD) Vaccine (4 - Td or Tdap) 04/23/2022     URINE DRUG SCREEN  09/20/2022     Annual Wellness Visit  10/20/2022     A1C Lab  11/02/2022     Eye Exam  12/06/2022       Signs of Hearing Loss      Hearing much better with one ear can be a sign of hearing loss.   Hearing loss is a problem shared by many people. In fact, it is one of the most common health problems, particularly as people age. Most people age 65 and older have some hearing loss. By age 80, almost everyone does. Hearing loss often occurs slowly over the years. So you may not realize your hearing has gotten worse.  Have your hearing checked  Call your healthcare provider if you:    Have to strain to hear normal conversation    Have to watch other people s faces very carefully to follow what they re saying    Need to ask people to repeat what they ve said    Often misunderstand what people are saying    Turn the volume of the television or radio up so high that others complain    Feel that people are mumbling when they re talking to you    Find that the effort to hear leaves you feeling tired and irritated    Notice, when using the phone, that you hear better with one ear than the other  [employer] last reviewed this educational content on 1/1/2020 2000-2021 [employer]. All rights reserved. This information is not intended as a substitute for professional medical care. Always follow your healthcare professional's  instructions.          Urinary Incontinence (Male)    Urinary incontinence means not being able to control the release of urine from the bladder.   Causes  Common causes of urinary incontinence in men include:    Infection    Certain medicines    Aging    Poor pelvic muscle tone    Bladder spasms    Obesity    Trouble urinating and fully emptying the bladder (urinary retention)  Other things that can cause incontinence are:     Nervous system diseases    Diabetes    Sleep apnea    Urinary tract infections    Prostate surgery    Pelvic injury  Constipation and smoking have also been identified as risk factors.   Symptoms    Urge incontinence (overactive bladder). This is a sudden urge to urinate. It occurs even though there may not be much urine in the bladder. The need to urinate often during the night is common. It's due to bladder spasms.    Stress incontinence. This is urine leakage that you can't control. It can occur with sneezing, coughing, and other actions that put stress on the bladder.    Treatment  Treatment depends on what is causing the condition. Bladder infections are treated with antibiotics. Urinary retention is treated with a bladder catheter.   Home care  Follow these guidelines when caring for yourself at home:    Don't have any foods and drinks that may irritate the bladder. This includes:  ? Chocolate  ? Alcohol  ? Caffeine  ? Carbonated drinks  ? Acidic fruits and juices    Limit fluids to 6 to 8 cups a day.    Lose weight if you are overweight. This will reduce your symptoms.    If advised, do regular pelvic muscle-strengthening exercises such as Kegel exercises.    If needed, wear absorbent pads to catch urine. Change the pads often. This is for good hygiene and to prevent skin and bladder infections.    Bathe daily for good hygiene.    If an antibiotic was prescribed to treat a bladder infection, take it until it's finished. Keep taking it even if you are feeling better. This is to make  sure your infection has cleared.    If a catheter was left in place, keep bacteria from getting into the collection bag. Don't disconnect the catheter from the collection bag.    Use a leg band to secure the catheter drainage tube, so it does not pull on the catheter. Drain the collection bag when it becomes full. To do this, use the drain spout at the bottom of the bag. Don't disconnect the bag from the catheter.    Don't pull on or try to remove a catheter. The catheter must be removed by a healthcare provider.    If you smoke, stop. Ask your provider for help if you can't do this on your own.  Follow-up care  Follow up with your healthcare provider, or as advised.  When to get medical advice  Call your healthcare provider right away if any of these occur:    Fever over 100.4 F (38 C), or as directed by your provider    Bladder pain or fullness    Belly swelling, nausea, or vomiting    Back pain    Weakness, dizziness, or fainting    If a catheter was left in place, return if:  ? The catheter falls out  ? The catheter stops draining for 6 hours  ? Your urine gets cloudy or smells bad  [employer] last reviewed this educational content on 1/1/2020 2000-2021 [employer]. All rights reserved. This information is not intended as a substitute for professional medical care. Always follow your healthcare professional's instructions.

## 2022-12-13 NOTE — PROGRESS NOTES
"SUBJECTIVE:   Jaun is a 82 year old who presents for Preventive Visit.    Doing well overall. Walking 30 mins daily with dog. Uses a cane when walking longer distances for balance. Only 1 fall this year. Feels stable. Losing weight intentionally through making healthier eating choices. As a result, decreased Lisinopril recently. Well controlled BP.     Sees eye doc, dentist, and dermatology  Due for Shingrix and Tdap which he can complete at the pharmacy.     Feels safe at home. Driving without issue.     Patient has been advised of split billing requirements and indicates understanding: Yes  Are you in the first 12 months of your Medicare coverage?  No    Healthy Habits:     In general, how would you rate your overall health?  Good    Frequency of exercise:  6-7 days/week    Duration of exercise:  15-30 minutes    Do you usually eat at least 4 servings of fruit and vegetables a day, include whole grains    & fiber and avoid regularly eating high fat or \"junk\" foods?  Yes    Medication side effects:  Muscle aches    Ability to successfully perform activities of daily living:  No assistance needed    Home Safety:  No safety concerns identified    Hearing Impairment:  Difficulty understanding soft or whispered speech    In the past 6 months, have you been bothered by leaking of urine? Yes    In general, how would you rate your overall mental or emotional health?  Good      PHQ-2 Total Score: 0    Additional concerns today:  Yes      Have you ever done Advance Care Planning? (For example, a Health Directive, POLST, or a discussion with a medical provider or your loved ones about your wishes): Yes, patient states has an Advance Care Planning document and will bring a copy to the clinic.     Fall risk  Fallen 2 or more times in the past year?: No  Any fall with injury in the past year?: Yes      Do you have sleep apnea, excessive snoring or daytime drowsiness?: yes, needs new mask (wants to lose weight for SPIRE " Program)    Reviewed and updated as needed this visit by clinical staff   Tobacco  Allergies  Meds              Reviewed and updated as needed this visit by Provider   Tobacco  Allergies  Meds             Social History     Tobacco Use     Smoking status: Former     Packs/day: 0.50     Years: 25.00     Pack years: 12.50     Types: Cigarettes     Start date:      Quit date:      Years since quittin.9     Smokeless tobacco: Never   Substance Use Topics     Alcohol use: No     Comment: quit in ; recovering       Current providers sharing in care for this patient include:  Patient Care Team:  Anne Heath MD as PCP - General (Internal Medicine)  Kassi Glez, PharmD as Pharmacist (Pharmacist)  Sebastian Holland MD as MD (Urology)  Brent Portillo MD as Assigned Heart and Vascular Provider  Tiffany Suero NP as Assigned Nephrology Provider  Anne Heath MD as Assigned PCP  Swetha Allen MD as MD (Anesthesiology)  Sebastian Holland MD as Assigned Surgical Provider  Kassi Glez, PharmD as Assigned MTM Pharmacist  Katelyn Vasquez MD as Assigned Musculoskeletal Provider    The following health maintenance items are reviewed in Epic and correct as of today:  Health Maintenance   Topic Date Due     HF ACTION PLAN  Never done     DIABETIC FOOT EXAM  Never done     ZOSTER IMMUNIZATION (2 of 3) 2008     DTAP/TDAP/TD IMMUNIZATION (4 - Td or Tdap) 2022     URINE DRUG SCREEN  2022     MEDICARE ANNUAL WELLNESS VISIT  10/20/2022     A1C  2022     EYE EXAM  2022     BMP  2023     MICROALBUMIN  2023     ANNUAL REVIEW OF HM ORDERS  2023     ALT  2023     LIPID  2023     CBC  2023     HEMOGLOBIN  10/13/2023     FALL RISK ASSESSMENT  2023     ADVANCE CARE PLANNING  2027     TSH W/FREE T4 REFLEX  Completed     SPIROMETRY  Completed     COPD ACTION PLAN  Completed     PHQ-2 (once per  calendar year)  Completed     INFLUENZA VACCINE  Completed     Pneumococcal Vaccine: 65+ Years  Completed     URINALYSIS  Completed     COVID-19 Vaccine  Completed     IPV IMMUNIZATION  Aged Out     MENINGITIS IMMUNIZATION  Aged Out     Lab work is in process  Labs reviewed in EPIC  BP Readings from Last 3 Encounters:   12/13/22 120/78   11/16/22 136/84   10/20/22 137/74    Wt Readings from Last 3 Encounters:   12/13/22 113.9 kg (251 lb 1.6 oz)   11/16/22 112.8 kg (248 lb 9.6 oz)   11/16/22 112.9 kg (249 lb)            Patient Active Problem List   Diagnosis     Morbid obesity (H)     PE (pulmonary thromboembolism) (H)     HTN (hypertension)     Chronic diastolic heart failure (H)     Dyspnea     Fatigue     RIN (obstructive sleep apnea)     COPD (chronic obstructive pulmonary disease) (H)     Hypothyroidism     Paroxysmal A-fib (H)     HLD (hyperlipidemia)     Lymphedema     (HFpEF) heart failure with preserved ejection fraction (H)     Essential hypertension     Cardiac pacemaker in situ     DVT (deep venous thrombosis) (H)     Coronary artery disease involving native coronary artery with angina pectoris, unspecified whether native or transplanted heart (H)     Obesity, Class III, BMI 40-49.9 (morbid obesity) (H)     Muscle tension headache     Heart failure with preserved ejection fraction, unspecified HF chronicity (H)     Status post coronary angiogram     Anxiety     Deep vein thrombosis (DVT) of distal vein of lower extremity, unspecified chronicity, unspecified laterality (H)     Diabetes mellitus, type 2 (H)     Chronic kidney disease, stage 3 (H)     Primary osteoarthritis of right shoulder     Chronic right shoulder pain     Long term current use of anticoagulants with INR goal of 2.0-3.0     Benign neoplasm of cerebral meninges (H)     Chronic left shoulder pain     Ankle injury, right, initial encounter     Past Surgical History:   Procedure Laterality Date     cardiac stenting       CV RIGHT HEART CATH  MEASUREMENTS RECORDED N/A 2019    Procedure: Right Heart Cath;  Surgeon: Roberto Hernandez MD;  Location:  HEART CARDIAC CATH LAB     ENT SURGERY      tonsillectomy     IMPLANT PACEMAKER       INJECT NERVE BLOCK SUPRASCAPULAR Bilateral 2021    Procedure: BLOCK, NERVE, SUPRASCAPULAR, Bilateral, under ultrasound guidance;  Surgeon: Swetha Allen MD;  Location: [facility] OR     INJECT NERVE BLOCK SUPRASCAPULAR Bilateral 2021    Procedure: bilateral suprascapular nerve block;  Surgeon: Swetha Allen MD;  Location: [facility] OR     INJECT NERVE BLOCK SUPRASCAPULAR Left 9/15/2022    Procedure: BLOCK, NERVE, SUPRASCAPULAR- left;  Surgeon: Swetha Allen MD;  Location: [facility] OR     INJECT NERVE BLOCK SUPRASCAPULAR Left 10/20/2022    Procedure: BLOCK, NERVE, SUPRASCAPULAR- left;  Surgeon: Swetha Allen MD;  Location: [facility] OR     NERVE BLOCK PERIPHERAL Bilateral 3/10/2022    Procedure: bilateral suprascapular nerve block;  Surgeon: Swetha Allen MD;  Location: [facility] OR     ORTHOPEDIC SURGERY Right 2012    knee replacement       Social History     Tobacco Use     Smoking status: Former     Packs/day: 0.50     Years: 25.00     Pack years: 12.50     Types: Cigarettes     Start date:      Quit date:      Years since quittin.9     Smokeless tobacco: Never   Substance Use Topics     Alcohol use: No     Comment: quit in ; recovering     Family History   Problem Relation Age of Onset     Hypertension Father          Current Outpatient Medications   Medication Sig Dispense Refill     acetaminophen (TYLENOL) 500 MG tablet Take 1,000 mg by mouth 3 times daily       Acetylcarnitine HCl (ACETYL L-CARNITINE PO) Take 1 capsule by mouth daily       albuterol (PROAIR HFA/PROVENTIL HFA/VENTOLIN HFA) 108 (90 Base) MCG/ACT inhaler Inhale 2 puffs into the lungs 4 times daily as needed for shortness of breath / dyspnea or wheezing       aspirin (ASA) 81 MG chewable tablet Take 1 tablet by mouth daily        atorvastatin (LIPITOR) 40 MG tablet Take 1 tablet (40 mg) by mouth every evening 90 tablet 3     diltiazem ER COATED BEADS (CARDIZEM CD/CARTIA XT) 300 MG 24 hr capsule Take 1 capsule (300 mg) by mouth daily 90 capsule 3     Fluticasone-Umeclidin-Vilanterol (TRELEGY ELLIPTA) 200-62.5-25 MCG/INH oral inhaler Inhale 1 puff into the lungs daily       furosemide (LASIX) 20 MG tablet Take 1 tablet (20 mg) by mouth every evening 30 tablet 0     furosemide (LASIX) 40 MG tablet Take 1 tablet (40 mg) by mouth every morning 90 tablet 1     isosorbide mononitrate (IMDUR) 30 MG 24 hr tablet Take 1 tablet (30 mg) by mouth daily 90 tablet 3     levothyroxine (SYNTHROID/LEVOTHROID) 175 MCG tablet TAKE 1 TABLET(175 MCG) BY MOUTH DAILY 90 tablet 2     lisinopril (ZESTRIL) 20 MG tablet Take 0.5 tablets (10 mg) by mouth daily 45 tablet 3     metolazone (ZAROXOLYN) 2.5 MG tablet TAKE 1 TABLET(2.5 MG) BY MOUTH TWICE DAILY 180 tablet 2     Multiple vitamin TABS Take 1 tablet by mouth daily       polyethylene glycol (MIRALAX) 17 g packet Take 1 packet by mouth daily       potassium chloride ER (KLOR-CON M) 10 MEQ CR tablet TAKE 1 TABLET(10 MEQ) BY MOUTH DAILY 90 tablet 0     spironolactone (ALDACTONE) 25 MG tablet Take 1 tablet (25 mg) by mouth every other day 20 tablet 0     tamsulosin (FLOMAX) 0.4 MG capsule Take 1 capsule (0.4 mg) by mouth daily 30 capsule 0     TAURINE PO Take 1 capsule by mouth daily       traMADol (ULTRAM) 50 MG tablet Take 0.5 tablets (25 mg) by mouth 2 times daily as needed for severe pain (7-10) 30 tablet 0     venlafaxine (EFFEXOR) 75 MG tablet Take 1 tablet (75 mg) by mouth 2 times daily 180 tablet 1     warfarin ANTICOAGULANT (COUMADIN) 1 MG tablet Take 1.5 mg by mouth daily       warfarin ANTICOAGULANT (COUMADIN) 5 MG tablet Take 1 tablet (5 mg) by mouth daily Or as directed by the INR clinic. INR DUE for further refills. 30 tablet 0     Allergies   Allergen Reactions     No Known Allergies      Recent Labs  "  Lab Test 10/18/22  0939 10/13/22  0934 09/18/22  0611 09/17/22  1350 08/02/22  0829 07/14/22  2327 05/23/22  1356 05/03/22  1656 03/25/22 2014 02/04/22  0902 10/28/21  1118 10/20/21  1022 08/31/21  1401 06/21/21  1454 06/14/21  1455   A1C  --   --   --   --  5.9*  --   --  5.8*  --  5.6   < >  --   --  6.1*  --    LDL  --   --   --   --  63  --   --  57  --   --   --  64  --   --   --    HDL  --   --   --   --  48  --   --  48  --   --   --  48  --   --   --    TRIG  --   --   --   --  92  --   --  180*  --   --   --  70  --   --   --    ALT  --   --   --   --  17 19  --  23   < >  --   --  20  --   --   --    CR  --  1.13 0.74   < > 1.01 1.59*   < > 1.21   < > 0.99   < > 1.85*   < > 1.57* 1.81*   GFRESTIMATED  --  64 90   < > 74 43*   < > 60*   < > 76   < > 33*   < > 40* 34*   GFRESTBLACK  --   --   --   --   --   --   --   --   --   --   --   --   --  47* 39*   POTASSIUM 3.5 3.1* 4.1   < > 3.6 3.7   < > 4.0   < > 3.5   < > 5.2   < > 4.1 4.7   TSH  --  3.07  --   --  1.01  --    < >  --   --   --   --  0.50  --   --  0.71    < > = values in this interval not displayed.        Review of Systems  Constitutional, HEENT, cardiovascular, pulmonary, GI, , musculoskeletal, neuro, skin, endocrine and psych systems are negative, except as otherwise noted.    OBJECTIVE:   /78 (BP Location: Right arm, Patient Position: Sitting, Cuff Size: Adult Large)   Pulse 85   Temp 97.7  F (36.5  C) (Oral)   Resp 20   Ht 1.778 m (5' 10\")   Wt 113.9 kg (251 lb 1.6 oz)   SpO2 94%   BMI 36.03 kg/m   Estimated body mass index is 36.03 kg/m  as calculated from the following:    Height as of this encounter: 1.778 m (5' 10\").    Weight as of this encounter: 113.9 kg (251 lb 1.6 oz).  Physical Exam  GENERAL: healthy, alert and no distress  EYES: Eyes grossly normal to inspection, PERRL and conjunctivae and sclerae normal  HENT: ear canals and TM's normal, nose and mouth without ulcers or lesions  NECK: no adenopathy, no " asymmetry, masses, or scars and thyroid normal to palpation  RESP: lungs clear to auscultation - no rales, rhonchi or wheezes  CV: regular rate and rhythm, normal S1 S2, no S3 or S4, no murmur, click or rub, +1 peripheral edema and peripheral pulses strong  ABDOMEN: soft, nontender, no hepatosplenomegaly, no masses and bowel sounds normal  MS: no gross musculoskeletal defects noted, no edema  SKIN: no suspicious lesions or rashes  NEURO: Normal strength and tone, mentation intact and speech normal  PSYCH: mentation appears normal, affect normal/bright  FOOT: Normal sensation bilaterally with no visible ulcerations    Diagnostic Test Results:  Labs reviewed in Epic    ASSESSMENT / PLAN:   Erich was seen today for physical, urinary problem and musculoskeletal problem.    Diagnoses and all orders for this visit:    Encounter for Medicare annual wellness exam    Doing well overall. Continue same medication. Close f/u with his specialists. Shingles immunization at the pharmacy.     -     HEMOGLOBIN A1C; Future  -     CBC with platelets and differential; Future  -     TSH with free T4 reflex; Future    Type 2 diabetes mellitus without complication, unspecified whether long term insulin use (H)    DM foot exam performed today. Check A1c.   -     HEMOGLOBIN A1C; Future    Encounter for long-term (current) use of medications  Atrial fibrillation, unspecified type (H)  Chronic diastolic heart failure (H)    INR draw tomorrow. Seeing cardiology in Feb once again.     Stage 3b chronic kidney disease (H)  -Labs ordered. Upcoming appt with Nephro    Hypothyroidism, unspecified type  -     TSH with free T4 reflex; Future    Patient has been advised of split billing requirements and indicates understanding: Yes      COUNSELING:  Reviewed preventive health counseling, as reflected in patient instructions       Regular exercise       Healthy diet/nutrition       Vision screening       Dental care       Bladder control       Fall  "risk prevention      BMI:   Estimated body mass index is 36.03 kg/m  as calculated from the following:    Height as of this encounter: 1.778 m (5' 10\").    Weight as of this encounter: 113.9 kg (251 lb 1.6 oz).         He reports that he quit smoking about 33 years ago. His smoking use included cigarettes. He started smoking about 58 years ago. He has a 12.50 pack-year smoking history. He has never used smokeless tobacco.      Appropriate preventive services were discussed with this patient, including applicable screening as appropriate for cardiovascular disease, diabetes, osteopenia/osteoporosis, and glaucoma.  As appropriate for age/gender, discussed screening for colorectal cancer, prostate cancer, breast cancer, and cervical cancer. Checklist reviewing preventive services available has been given to the patient.    Reviewed patients plan of care and provided an AVS. The Basic Care Plan (routine screening as documented in Health Maintenance) for Erich meets the Care Plan requirement. This Care Plan has been established and reviewed with the Patient.          CHELSIE Carr [facility]    Identified Health Risks:    The patient was provided with written information regarding signs of hearing loss.  Information on urinary incontinence and treatment options given to patient.  "

## 2022-12-14 ENCOUNTER — TELEPHONE (OUTPATIENT)
Dept: FAMILY MEDICINE | Facility: CLINIC | Age: 82
End: 2022-12-14

## 2022-12-14 ENCOUNTER — LAB (OUTPATIENT)
Dept: LAB | Facility: CLINIC | Age: 82
End: 2022-12-14
Payer: MEDICARE

## 2022-12-14 ENCOUNTER — ANTICOAGULATION THERAPY VISIT (OUTPATIENT)
Dept: ANTICOAGULATION | Facility: CLINIC | Age: 82
End: 2022-12-14

## 2022-12-14 DIAGNOSIS — I82.4Z9 DEEP VEIN THROMBOSIS (DVT) OF DISTAL VEIN OF LOWER EXTREMITY, UNSPECIFIED CHRONICITY, UNSPECIFIED LATERALITY (H): ICD-10-CM

## 2022-12-14 DIAGNOSIS — E87.6 HYPOKALEMIA: ICD-10-CM

## 2022-12-14 DIAGNOSIS — Z79.01 LONG TERM CURRENT USE OF ANTICOAGULANTS WITH INR GOAL OF 2.0-3.0: ICD-10-CM

## 2022-12-14 DIAGNOSIS — N18.31 CHRONIC KIDNEY DISEASE, STAGE 3A (H): ICD-10-CM

## 2022-12-14 DIAGNOSIS — I48.0 PAROXYSMAL A-FIB (H): ICD-10-CM

## 2022-12-14 DIAGNOSIS — I26.99 PE (PULMONARY THROMBOEMBOLISM) (H): Primary | ICD-10-CM

## 2022-12-14 DIAGNOSIS — Z00.00 ENCOUNTER FOR MEDICARE ANNUAL WELLNESS EXAM: ICD-10-CM

## 2022-12-14 DIAGNOSIS — E11.9 TYPE 2 DIABETES MELLITUS WITHOUT COMPLICATION, UNSPECIFIED WHETHER LONG TERM INSULIN USE (H): ICD-10-CM

## 2022-12-14 DIAGNOSIS — I26.99 PE (PULMONARY THROMBOEMBOLISM) (H): ICD-10-CM

## 2022-12-14 DIAGNOSIS — E03.9 HYPOTHYROIDISM, UNSPECIFIED TYPE: ICD-10-CM

## 2022-12-14 DIAGNOSIS — I48.91 ATRIAL FIBRILLATION, UNSPECIFIED TYPE (H): ICD-10-CM

## 2022-12-14 LAB
ALBUMIN MFR UR ELPH: <6 MG/DL
ALBUMIN SERPL BCG-MCNC: 4 G/DL (ref 3.5–5.2)
ANION GAP SERPL CALCULATED.3IONS-SCNC: 11 MMOL/L (ref 7–15)
BASOPHILS # BLD AUTO: 0 10E3/UL (ref 0–0.2)
BASOPHILS NFR BLD AUTO: 0 %
BUN SERPL-MCNC: 23.3 MG/DL (ref 8–23)
CALCIUM SERPL-MCNC: 9.2 MG/DL (ref 8.8–10.2)
CHLORIDE SERPL-SCNC: 97 MMOL/L (ref 98–107)
CREAT SERPL-MCNC: 0.94 MG/DL (ref 0.67–1.17)
CREAT UR-MCNC: 29.7 MG/DL
DEPRECATED HCO3 PLAS-SCNC: 29 MMOL/L (ref 22–29)
EOSINOPHIL # BLD AUTO: 0.2 10E3/UL (ref 0–0.7)
EOSINOPHIL NFR BLD AUTO: 2 %
ERYTHROCYTE [DISTWIDTH] IN BLOOD BY AUTOMATED COUNT: 16.1 % (ref 10–15)
GFR SERPL CREATININE-BSD FRML MDRD: 80 ML/MIN/1.73M2
GLUCOSE SERPL-MCNC: 112 MG/DL (ref 70–99)
HBA1C MFR BLD: 5.8 % (ref 0–5.6)
HCT VFR BLD AUTO: 40 % (ref 40–53)
HGB BLD-MCNC: 12.5 G/DL (ref 13.3–17.7)
IMM GRANULOCYTES # BLD: 0 10E3/UL
IMM GRANULOCYTES NFR BLD: 0 %
INR BLD: 2.1 (ref 0.9–1.1)
LYMPHOCYTES # BLD AUTO: 1.4 10E3/UL (ref 0.8–5.3)
LYMPHOCYTES NFR BLD AUTO: 14 %
MCH RBC QN AUTO: 30.7 PG (ref 26.5–33)
MCHC RBC AUTO-ENTMCNC: 31.3 G/DL (ref 31.5–36.5)
MCV RBC AUTO: 98 FL (ref 78–100)
MONOCYTES # BLD AUTO: 0.9 10E3/UL (ref 0–1.3)
MONOCYTES NFR BLD AUTO: 9 %
NEUTROPHILS # BLD AUTO: 7.6 10E3/UL (ref 1.6–8.3)
NEUTROPHILS NFR BLD AUTO: 74 %
PHOSPHATE SERPL-MCNC: 2.7 MG/DL (ref 2.5–4.5)
PLATELET # BLD AUTO: 220 10E3/UL (ref 150–450)
POTASSIUM SERPL-SCNC: 4.2 MMOL/L (ref 3.4–5.3)
PROT/CREAT 24H UR: NORMAL MG/G{CREAT}
RBC # BLD AUTO: 4.07 10E6/UL (ref 4.4–5.9)
SODIUM SERPL-SCNC: 137 MMOL/L (ref 136–145)
TSH SERPL DL<=0.005 MIU/L-ACNC: 1.58 UIU/ML (ref 0.3–4.2)
WBC # BLD AUTO: 10.2 10E3/UL (ref 4–11)

## 2022-12-14 PROCEDURE — 84443 ASSAY THYROID STIM HORMONE: CPT

## 2022-12-14 PROCEDURE — 80069 RENAL FUNCTION PANEL: CPT

## 2022-12-14 PROCEDURE — 85025 COMPLETE CBC W/AUTO DIFF WBC: CPT

## 2022-12-14 PROCEDURE — 84156 ASSAY OF PROTEIN URINE: CPT

## 2022-12-14 PROCEDURE — 85610 PROTHROMBIN TIME: CPT

## 2022-12-14 PROCEDURE — 36415 COLL VENOUS BLD VENIPUNCTURE: CPT

## 2022-12-14 PROCEDURE — 36416 COLLJ CAPILLARY BLOOD SPEC: CPT

## 2022-12-14 PROCEDURE — 83036 HEMOGLOBIN GLYCOSYLATED A1C: CPT

## 2022-12-14 NOTE — RESULT ENCOUNTER NOTE
Real Jung,    I have had the opportunity to review your recent results and an interpretation is as follows:  Your basic metabolic panel shows stable renal function and electrolytes     Sincerely,  Kevin Momin MD

## 2022-12-14 NOTE — PROGRESS NOTES
ANTICOAGULATION MANAGEMENT     Erich Craven 82 year old male is on warfarin with therapeutic INR result. (Goal INR 2.0-3.0)    Recent labs: (last 7 days)     12/14/22  0957   INR 2.1*       ASSESSMENT       Source(s): Chart Review and Patient/Caregiver Call       Warfarin doses taken: Warfarin taken as instructed    Diet: No new diet changes identified    New illness, injury, or hospitalization: No    Medication/supplement changes: None noted    Signs or symptoms of bleeding or clotting: No    Previous INR: Subtherapeutic    Additional findings: Upcoming surgery/procedure: Left total shoulder arthroplasty scheduled for 2/21/23        PLAN     Recommended plan for no diet, medication or health factor changes affecting INR     Dosing Instructions: Continue your current warfarin dose with next INR in 2-3 weeks       Summary  As of 12/14/2022    Full warfarin instructions:  7.5 mg every Thu; 5 mg all other days; Starting 12/14/2022   Next INR check:  12/30/2022             Telephone call with Jaun who verbalizes understanding and agrees to plan    Lab visit scheduled    Education provided:     Please call back if any changes to your diet, medications or how you've been taking warfarin    Symptom monitoring: monitoring for bleeding signs and symptoms and monitoring for clotting signs and symptoms    Plan made per [facility] anticoagulation protocol    Nicole Gaytan RN  Anticoagulation Clinic  12/14/2022    _______________________________________________________________________     Anticoagulation Episode Summary     Current INR goal:  2.0-3.0   TTR:  45.9 % (1 y)   Target end date:  Indefinite   Send INR reminders to:  KAMRANAG HERNAN    Indications    PE (pulmonary thromboembolism) (H) [I26.99]  Paroxysmal A-fib (H) [I48.0]  Deep vein thrombosis (DVT) of distal vein of lower extremity  unspecified chronicity  unspecified laterality (H) [I82.4Z9]  Long term current use of anticoagulants with INR goal of 2.0-3.0  [Z79.01]           Comments:           Anticoagulation Care Providers     Provider Role Specialty Phone number    Dontrell Gómez MD Referring Internal Medicine [phone]    Anne Heath MD Referring Internal Medicine [phone]

## 2022-12-14 NOTE — TELEPHONE ENCOUNTER
Upcoming surgery/procedure: Left total shoulder arthroplasty scheduled for 2/21/23. Routing to [facility] for review and recommendations. Nicole Gaytan, NELAN, RN

## 2022-12-14 NOTE — RESULT ENCOUNTER NOTE
Dl Zamorano,     Blood count stable.  Thyroid stable.  A1c improved from 5.9 to 5.8% which is great!     Let me know if you have any questions or concerns,     Erich Duarte PA-C  [facility]

## 2022-12-15 ENCOUNTER — TELEPHONE (OUTPATIENT)
Dept: ORTHOPEDICS | Facility: CLINIC | Age: 82
End: 2022-12-15

## 2022-12-15 ENCOUNTER — TELEPHONE (OUTPATIENT)
Dept: ANESTHESIOLOGY | Facility: CLINIC | Age: 82
End: 2022-12-15

## 2022-12-15 NOTE — TELEPHONE ENCOUNTER
Patient was called and a message was left.    From looking in the chart the pain clinic recommended that he reach out to Dr. Vasquez's team for approval on if he could have a suprascapular nerve block before surgery in February.  Gregg PRETTY also placed a note.  Message was left that yes he can have the injection but it has to be more than 4 week out from surgery.  It was also stated that he will be able to talk to the anesthesiologist team at his PAC visit for his H&P before surgery.  Our number was left for any other questions.

## 2022-12-15 NOTE — TELEPHONE ENCOUNTER
[facility] Call Center    Phone Message    May a detailed message be left on voicemail: yes     Reason for Call: Other: Was told to contact the anesthesiologist before his surgery. He needs to know the name & contact number of the anesthesiologist    Action Taken: Message routed to:  [facility]    Travel Screening: Not Applicable

## 2022-12-15 NOTE — TELEPHONE ENCOUNTER
Sent message to Shani Shelton RN that it was ok to get a suprascapular nerve block on the left side at least 4 weeks prior to surgery. Looks like he may have one in October and they are typically done every 3 months (max) so he could get one mid to end of January and be fine.     Gregg Tellez RN

## 2022-12-15 NOTE — TELEPHONE ENCOUNTER
Pt is looking to repeat a shoulder injection.   Left suprascapular nerve block was last perfomed on 10/20/22.     Pt is schedule for upcoming surgery on 2/21/23 with Dr. Vasquez (ARTHROPLASTY, SHOULDER, TOTAL, REVERSE LEFT)    Pt was asked to reach out to their surgical team to verify that it was okay to repeat this procedure.     LPN was contacted by RN Care Coordinator, Gregg Tellez who stated:     Pt can have a suprascapular nerve block prior to surgery as long as its 4 weeks or more beforehand (more so for infection risk). No injections in the joint.     LPN will route this information to Dr. Allen to review and place orders.     Shani Shelton LPN

## 2022-12-22 ENCOUNTER — MYC MEDICAL ADVICE (OUTPATIENT)
Dept: ANESTHESIOLOGY | Facility: CLINIC | Age: 82
End: 2022-12-22

## 2022-12-23 DIAGNOSIS — M25.512 CHRONIC LEFT SHOULDER PAIN: Primary | ICD-10-CM

## 2022-12-23 DIAGNOSIS — G89.29 CHRONIC LEFT SHOULDER PAIN: Primary | ICD-10-CM

## 2022-12-23 RX ORDER — DIAZEPAM 5 MG
5-10 TABLET ORAL
Status: CANCELLED | OUTPATIENT
Start: 2022-12-23

## 2022-12-26 DIAGNOSIS — N18.31 CHRONIC KIDNEY DISEASE, STAGE 3A (H): Primary | ICD-10-CM

## 2022-12-27 ENCOUNTER — TELEPHONE (OUTPATIENT)
Dept: ANESTHESIOLOGY | Facility: CLINIC | Age: 82
End: 2022-12-27

## 2022-12-27 ENCOUNTER — TELEPHONE (OUTPATIENT)
Dept: FAMILY MEDICINE | Facility: CLINIC | Age: 82
End: 2022-12-27

## 2022-12-27 DIAGNOSIS — I26.99 PE (PULMONARY THROMBOEMBOLISM) (H): Primary | ICD-10-CM

## 2022-12-27 DIAGNOSIS — I82.4Z9 DEEP VEIN THROMBOSIS (DVT) OF DISTAL VEIN OF LOWER EXTREMITY, UNSPECIFIED CHRONICITY, UNSPECIFIED LATERALITY (H): ICD-10-CM

## 2022-12-27 DIAGNOSIS — Z79.01 LONG TERM CURRENT USE OF ANTICOAGULANTS WITH INR GOAL OF 2.0-3.0: ICD-10-CM

## 2022-12-27 DIAGNOSIS — I48.0 PAROXYSMAL A-FIB (H): ICD-10-CM

## 2022-12-27 NOTE — TELEPHONE ENCOUNTER
Linda nurse with Pain Clinic calling for Coumadin Hold orders for patient. He is having a Nerve Block, Suprascapular (left) on 1/5/23 and 5 day hold for coumadin requested.    Please advise.    Thank you.  Roxanna Garcia, RN  Anticoagulation Nurse - [facility]

## 2022-12-27 NOTE — TELEPHONE ENCOUNTER
RN called and left voicemail on nurse line at Anticoagulation Clinic. Writer requesting orders for patient to hold Warfarin for 5 days before upcoming procedure on 1/5/23 with Dr. Allen. Left call back number [phone] with further instruction.     Linda Serrano RN

## 2022-12-27 NOTE — TELEPHONE ENCOUNTER
10/13/22 OV notes: for 10/20/22 injection.     I48.91) Atrial fibrillation, unspecified type (H)  Comment:   Plan: pt on warfarin. Has upcoming injection by the pain clinic and was given his warfarin instructions to hold x 5d, no bridging necessary.       Tolerated suprascapular nerve blocks 2x 2021 with no bridge     See 8/29/22 TE for full assessment of indication and thromboembolism risk.      RECOMMENDATION       Pre-Procedure:  o Hold warfarin for 5 days, until after procedure starting: Saturday, December 31   o No Bridge      Post-Procedure:  o Resume warfarin dose if okay with provider doing procedure on night of procedure, Thursday, January 5 PM: take 10 mg x 2 then resume home dose  o Recheck INR ~ 7 days after resuming warfarin     ?   Rohini [facility]

## 2022-12-27 NOTE — TELEPHONE ENCOUNTER
RN called patient and left a voicemail to discuss pre-procedure instructions and blood thinner hold. Left call back number to discuss. Writer called Anticoag Clinic and LM requesting orders to hold Warfarin.       Linda Serrano RN

## 2022-12-28 NOTE — TELEPHONE ENCOUNTER
Called Jaun to review. No answer. Left vm to return call to [facility] RN.  Paola BONDS RN  Anticoagulation Team

## 2022-12-28 NOTE — TELEPHONE ENCOUNTER
Jaun returned call. Reviewed warfarin hold/restart plan. Scheduled next INR 1/12/23. No other questions or concerns at this time. Sending written instructions via [employer] message as well.  Paola BONDS RN  Anticoagulation Team

## 2022-12-29 NOTE — TELEPHONE ENCOUNTER
RN called patient and LM to discuss pre-procedure instructions for upcoming procedure with Dr. Allen on 1/5/23. Instructions sent via [employer] for review. Patient is approved for 5 day hold of Warfarin and no bridging needed. Please see 12/28 Mychart encounter for approval. Left call back number to discuss if patient has further questions.     Linda Serrano RN

## 2023-01-02 ENCOUNTER — MYC REFILL (OUTPATIENT)
Dept: ANESTHESIOLOGY | Facility: CLINIC | Age: 83
End: 2023-01-02

## 2023-01-02 ENCOUNTER — LAB (OUTPATIENT)
Dept: LAB | Facility: CLINIC | Age: 83
End: 2023-01-02
Payer: MEDICARE

## 2023-01-02 DIAGNOSIS — N18.31 CHRONIC KIDNEY DISEASE, STAGE 3A (H): ICD-10-CM

## 2023-01-02 DIAGNOSIS — M25.512 CHRONIC LEFT SHOULDER PAIN: ICD-10-CM

## 2023-01-02 DIAGNOSIS — G89.29 CHRONIC LEFT SHOULDER PAIN: ICD-10-CM

## 2023-01-02 LAB
ALBUMIN SERPL BCG-MCNC: 4.3 G/DL (ref 3.5–5.2)
ANION GAP SERPL CALCULATED.3IONS-SCNC: 14 MMOL/L (ref 7–15)
BUN SERPL-MCNC: 25.5 MG/DL (ref 8–23)
CALCIUM SERPL-MCNC: 9.7 MG/DL (ref 8.8–10.2)
CHLORIDE SERPL-SCNC: 95 MMOL/L (ref 98–107)
CREAT SERPL-MCNC: 0.96 MG/DL (ref 0.67–1.17)
DEPRECATED HCO3 PLAS-SCNC: 28 MMOL/L (ref 22–29)
GFR SERPL CREATININE-BSD FRML MDRD: 78 ML/MIN/1.73M2
GLUCOSE SERPL-MCNC: 81 MG/DL (ref 70–99)
HGB BLD-MCNC: 13.1 G/DL (ref 13.3–17.7)
PHOSPHATE SERPL-MCNC: 2.7 MG/DL (ref 2.5–4.5)
POTASSIUM SERPL-SCNC: 4.1 MMOL/L (ref 3.4–5.3)
SODIUM SERPL-SCNC: 137 MMOL/L (ref 136–145)

## 2023-01-02 PROCEDURE — 85018 HEMOGLOBIN: CPT

## 2023-01-02 PROCEDURE — 36415 COLL VENOUS BLD VENIPUNCTURE: CPT

## 2023-01-02 PROCEDURE — 80069 RENAL FUNCTION PANEL: CPT

## 2023-01-03 ENCOUNTER — VIRTUAL VISIT (OUTPATIENT)
Dept: NEPHROLOGY | Facility: CLINIC | Age: 83
End: 2023-01-03
Payer: MEDICARE

## 2023-01-03 VITALS — BODY MASS INDEX: 33.18 KG/M2 | HEIGHT: 72 IN | WEIGHT: 245 LBS

## 2023-01-03 DIAGNOSIS — N18.2 CHRONIC RENAL DISEASE, STAGE II: Primary | ICD-10-CM

## 2023-01-03 PROCEDURE — 99214 OFFICE O/P EST MOD 30 MIN: CPT | Mod: 95

## 2023-01-03 ASSESSMENT — PAIN SCALES - GENERAL: PAINLEVEL: NO PAIN (0)

## 2023-01-03 NOTE — PROGRESS NOTES
Nephrology Video Visit 1/3/22    Assessment and Plan:    1. 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 Creat 0.9, eGFR 78 ml/mn, UPCR 0 g/gCr    His creat has improved since 3/22 following reduction in ACE.    Baseline creat had been running mid to upper 1's since 8/20 w/recurrent DONA but ACE dose was cut in half 10/21 and then again reduced this summer. His creat has been declining since.    - Etiology of his CKD likely HTN/DONA   - Renal US normal   - Does not use NSAIDs   - Blood pressure at goal    - DM well controlled with A1c of 5.8 % ( 12/22)   - He is on statin for CV risk reduction    2. CV/HTN/Pafib, Diastolic HF/PPM - Currently well controlled w/o edema. He has been loosing significant weight as well, intentionally, in order to qualify for the RIN [employer] Program. No dyspnea. Weight 248 #, down from 262# last visit. Was 292# in 6/21. Home and clinic b/ps 120 - 130's/.  Has not brought in his b/p device to be checked for accuracy. Echo 1/22 showing EF 67%, dilated IVC. He is on triple diuretic therapy. Follows with Dr Jumana Dixon ( last seen 11/16/22)  Current regimen:    Coreg 12.5 mg bid   Lasix 40 mg AM, 20 mg PM   Spironolactone 25 mg every other day   Lisinopril 10 mg every day   Diltiazem  mg every day   Metolazone 2.5 mg bid    - Continue current regimen     3. Electrolytes - No acute concerns. Na 137, K 4.1    - On KCL 10 meq qd    4. Acid base - No acute concerns. Bicarb 28    5. BMD - Ca 9.7 Phos 2.7 albumin 4.3   Vit D 38, PTH 92 ( 6/22)   Not currently on Vit D    6. BPH - Controlled with Flomax    7. Anemia - Hgb 13.1   - Iron studies 10/21: Ferritin 81 Fe 60 IS 19   - He does not require iron/LEIGH at this time    8. Disposition - RTC 6 months for follow up w/labs prior    Assessment and plan was discussed with patient and he voiced his understanding and agreement.    Reason for Visit:  CKD2/HTN    HPI:  Mr Craven is an 81 yo male with  CKD2, COPD, RIN on BiPAP, Diastolic HF, SSS s/p PPM, CAD, P-Afib, HTN, BPH, h/o Pulmonary embolism, present today for CKD follow up. Last seen in clinic by me on 6/28/22  Baseline creat has been < 1.1 since 7/22    ROS:   No complaints  Had successful right shoulder replacement surgery last summer at [place]. Having the left shoulder done next month at [facility].   He is working on weight loss so he can qualify for the [employer] RIN program. Only 10 more pounds to go. He has not been able to tolerate CPAP  He is walking three times per day and modifying his caloric intake. Weight down to 248 #   Home blood 120's/   Has completed his COVID vax series/booster and Flu vax  Denies CP/dyspnea  No abdominal concerns  Voiding w/o complication. On Flomax  Appetite is good. Has been a vegetarian for years   Walking with his dog regularly    Chronic Health Problems:    CKD2  COPD  RIN on CPAP  Diastolic HF  SSS s/p PPM  CAD  P-Afib  h/o Pulmonary embolism  HTN  Hypothyroidism  Anxiety  BPH  HLD    Family Hx:   Family History   Problem Relation Age of Onset     Hypertension Father      Personal Hx:   , lives with his puppy Clayton. Lives in [facility] apt connected to the [facility] ( [facility] . Has services and meals. He continues to set up his own medications. NS    Allergies:  Allergies   Allergen Reactions     No Known Allergies        Medications:  Current Outpatient Medications   Medication Sig     acetaminophen (TYLENOL) 500 MG tablet Take 1,000 mg by mouth 3 times daily     Acetylcarnitine HCl (ACETYL L-CARNITINE PO) Take 1 capsule by mouth daily     albuterol (PROAIR HFA/PROVENTIL HFA/VENTOLIN HFA) 108 (90 Base) MCG/ACT inhaler Inhale 2 puffs into the lungs 4 times daily as needed for shortness of breath / dyspnea or wheezing     aspirin (ASA) 81 MG chewable tablet Take 1 tablet by mouth daily     atorvastatin (LIPITOR) 40 MG tablet Take 1 tablet (40 mg) by mouth every evening     diltiazem ER COATED BEADS  (CARDIZEM CD/CARTIA XT) 300 MG 24 hr capsule Take 1 capsule (300 mg) by mouth daily     Fluticasone-Umeclidin-Vilanterol (TRELEGY ELLIPTA) 200-62.5-25 MCG/INH oral inhaler Inhale 1 puff into the lungs daily     furosemide (LASIX) 20 MG tablet Take 1 tablet (20 mg) by mouth every evening     furosemide (LASIX) 40 MG tablet Take 1 tablet (40 mg) by mouth every morning     isosorbide mononitrate (IMDUR) 30 MG 24 hr tablet Take 1 tablet (30 mg) by mouth daily     levothyroxine (SYNTHROID/LEVOTHROID) 175 MCG tablet TAKE 1 TABLET(175 MCG) BY MOUTH DAILY     lisinopril (ZESTRIL) 20 MG tablet Take 0.5 tablets (10 mg) by mouth daily     metolazone (ZAROXOLYN) 2.5 MG tablet TAKE 1 TABLET(2.5 MG) BY MOUTH TWICE DAILY     Multiple vitamin TABS Take 1 tablet by mouth daily     polyethylene glycol (MIRALAX) 17 g packet Take 1 packet by mouth daily     potassium chloride ER (KLOR-CON M) 10 MEQ CR tablet TAKE 1 TABLET(10 MEQ) BY MOUTH DAILY     spironolactone (ALDACTONE) 25 MG tablet Take 1 tablet (25 mg) by mouth every other day     tamsulosin (FLOMAX) 0.4 MG capsule Take 1 capsule (0.4 mg) by mouth daily     TAURINE PO Take 1 capsule by mouth daily     traMADol (ULTRAM) 50 MG tablet Take 0.5 tablets (25 mg) by mouth 2 times daily as needed for severe pain (7-10)     venlafaxine (EFFEXOR) 75 MG tablet Take 1 tablet (75 mg) by mouth 2 times daily     warfarin ANTICOAGULANT (COUMADIN) 1 MG tablet Take 1.5 mg by mouth daily     warfarin ANTICOAGULANT (COUMADIN) 5 MG tablet Take 1 tablet (5 mg) by mouth daily Or as directed by the INR clinic. INR DUE for further refills.     No current facility-administered medications for this visit.      Vitals:  Weight 248 #    Exam:  GEN: Pleasant male in NAD  RESP: Breathing is non labored. Speaking in full sentences  NEURO: A/O  PSYCH: Bright affect, engaging     LABS:   [facility]  Recent Labs   Lab Test 01/02/23  1047 12/14/22  0957 10/18/22  0939 10/13/22  0934 09/19/22  1206 09/18/22  0758  09/18/22  0611 08/31/21  1401 06/21/21  1454 06/14/21  1455 04/21/21  0931 03/02/21  1405    137  --  138  --   --  139   < > 138 137 138 136   POTASSIUM 4.1 4.2 3.5 3.1*  --   --  4.1   < > 4.1 4.7 3.7 4.5   CHLORIDE 95* 97*  --  99  --   --  106   < > 103 105 99 104   CO2 28 29  --  31  --   --  27   < > 29 28 39* 28   ANIONGAP 14 11  --  8  --   --  6   < > 6 4 <1* 4   GLC 81 112*  --  110* 113*   < > 119*   < > 100* 108* 104* 93   BUN 25.5* 23.3*  --  43*  --   --  29   < > 64* 62* 44* 57*   CR 0.96 0.94  --  1.13  --   --  0.74   < > 1.57* 1.81* 1.02 1.42*   GFRESTIMATED 78 80  --  64  --   --  90   < > 40* 34* 68 46*   GFRESTBLACK  --   --   --   --   --   --   --   --  47* 39* 79 53*   LAWSON 9.7 9.2  --  9.5  --   --  9.4   < > 9.4 9.2 9.2 9.6    < > = values in this interval not displayed.     Recent Labs   Lab Test 08/02/22  0829 07/14/22  2327 05/23/22  1356 05/03/22  1656 03/27/22  1600   BILITOTAL 0.4 0.4 0.5 0.4 0.4   ALKPHOS 100 99 99 109 90   ALT 17 19  --  23 22   AST 13 27 16 17 14     CBC  Recent Labs   Lab Test 01/02/23  1047 12/14/22  0957 10/13/22  0934 09/18/22  0611 09/17/22  1350 08/02/22  0829   HGB 13.1* 12.5* 13.6 11.0* 12.0* 12.6*   WBC  --  10.2  --  12.3* 11.2* 10.1   RBC  --  4.07*  --  3.64* 4.02* 4.14*   HCT  --  40.0  --  34.8* 38.0* 39.6*   MCV  --  98  --  96 95 96   MCH  --  30.7  --  30.2 29.9 30.4   MCHC  --  31.3*  --  31.6 31.6 31.8   RDW  --  16.1*  --  16.2* 16.2* 14.8   PLT  --  220  --  205 205 283     URINE STUDIES  Recent Labs   Lab Test 07/15/22  0046 03/25/22  1855 06/14/21  1516 04/21/21  0853 12/21/20  1219 12/14/20  1706   COLOR Yellow Yellow Yellow Yellow Yellow Yellow   APPEARANCE Clear Clear Cloudy Clear Clear Clear   URINEGLC Negative Negative Negative Negative Negative Negative   URINEBILI Negative Negative Negative Negative Negative Negative   URINEKETONE Negative Negative Negative Negative Negative Negative   SG 1.022 1.015 1.020 1.015 1.015 1.015   UBLD  Negative Negative Small* Negative Negative Trace*   URINEPH 6.5 5.5 5.0 7.0 5.0 6.0   PROTEIN Negative Negative 30* Negative Negative 30*   UROBILINOGEN  --  0.2 0.2 0.2 0.2 0.2   NITRITE Negative Negative Negative Negative Negative Negative   LEUKEST Negative Negative Moderate* Negative Small* Small*   RBCU 1  --  Unable to quantitate other elements due to packed WBC's.*  --  O - 2 2-5*   WBCU 1  --  >100*  --  10-25* 25-50*     Recent Labs   Lab Test 05/23/22  1356 02/04/22  0902 10/28/21  1118 06/14/21  1516   UTPG 0.18 0.29* 0.44* 0.38*     PTH  Recent Labs   Lab Test 05/23/22  1356 06/14/21  1455   PTHI 92* 107*     IRON STUDIES  Recent Labs   Lab Test 08/02/22  0829 10/28/21  1118 10/20/21  1022   IRON  --  60  --    FEB  --  311  --    IRONSAT  --  19  --    SEGUNDO 58 81 78       Tiffany Suero, NP

## 2023-01-03 NOTE — TELEPHONE ENCOUNTER
Refill request    Medication: traMADol (ULTRAM) 50 MG tablet    Sig: Take 0.5 tablets (25 mg) by mouth 2 times daily as needed for severe pain (7-10)    Last prescription written for:   Dispensed: 30  Refills: 0  SOLD to the pt on: Unable to check     Last clinic appointment: 8/25/22  Next clinic appointment: Not scheduled    Last Drug Screen Collected: 9/20/21  Controlled Substance Agreement signed: 9/20/21      Preferred pharmacy:    [id number] [place], [place] - [address]

## 2023-01-03 NOTE — LETTER
1/3/2023       RE: Erich Craven  [address]  [place] [address]     Dear Colleague,    Thank you for referring your patient, Erich Craven, to the [facility] [place] [facility]. Please see a copy of my visit note below.        Nephrology Video Visit 1/3/22    Assessment and Plan:    1. 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 Creat 0.9, eGFR 78 ml/mn, UPCR 0 g/gCr    His creat has improved since 3/22 following reduction in ACE.    Baseline creat had been running mid to upper 1's since 8/20 w/recurrent DONA but ACE dose was cut in half 10/21 and then again reduced this summer. His creat has been declining since.    - Etiology of his CKD likely HTN/DONA   - Renal US normal   - Does not use NSAIDs   - Blood pressure at goal    - DM well controlled with A1c of 5.8 % ( 12/22)   - He is on statin for CV risk reduction    2. CV/HTN/Pafib, Diastolic HF/PPM - Currently well controlled w/o edema. He has been loosing significant weight as well, intentionally, in order to qualify for the RIN [employer] Program. No dyspnea. Weight 248 #, down from 262# last visit. Was 292# in 6/21. Home and clinic b/ps 120 - 130's/.  Has not brought in his b/p device to be checked for accuracy. Echo 1/22 showing EF 67%, dilated IVC. He is on triple diuretic therapy. Follows with Dr Jumana Dixon ( last seen 11/16/22)  Current regimen:    Coreg 12.5 mg bid   Lasix 40 mg AM, 20 mg PM   Spironolactone 25 mg every other day   Lisinopril 10 mg every day   Diltiazem  mg every day   Metolazone 2.5 mg bid    - Continue current regimen     3. Electrolytes - No acute concerns. Na 137, K 4.1    - On KCL 10 meq qd    4. Acid base - No acute concerns. Bicarb 28    5. BMD - Ca 9.7 Phos 2.7 albumin 4.3   Vit D 38, PTH 92 ( 6/22)   Not currently on Vit D    6. BPH - Controlled with Flomax    7. Anemia - Hgb  13.1   - Iron studies 10/21: Ferritin 81 Fe 60 IS 19   - He does not require iron/LEIGH at this time    8. Disposition - RTC 6 months for follow up w/labs prior    Assessment and plan was discussed with patient and he voiced his understanding and agreement.    Reason for Visit:  CKD2/HTN    HPI:  Mr Craven is an 81 yo male with CKD2, COPD, RIN on BiPAP, Diastolic HF, SSS s/p PPM, CAD, P-Afib, HTN, BPH, h/o Pulmonary embolism, present today for CKD follow up. Last seen in clinic by me on 6/28/22  Baseline creat has been < 1.1 since 7/22    ROS:   No complaints  Had successful right shoulder replacement surgery last summer at [place]. Having the left shoulder done next month at [facility].   He is working on weight loss so he can qualify for the [email or web address] RIN program. Only 10 more pounds to go. He has not been able to tolerate CPAP  He is walking three times per day and modifying his caloric intake. Weight down to 248 #   Home blood 120's/   Has completed his COVID vax series/booster and Flu vax  Denies CP/dyspnea  No abdominal concerns  Voiding w/o complication. On Flomax  Appetite is good. Has been a vegetarian for years   Walking with his dog regularly    Chronic Health Problems:    CKD2  COPD  RIN on CPAP  Diastolic HF  SSS s/p PPM  CAD  P-Afib  h/o Pulmonary embolism  HTN  Hypothyroidism  Anxiety  BPH  HLD    Family Hx:   Family History   Problem Relation Age of Onset     Hypertension Father      Personal Hx:   , lives with his puppy Clayton. Lives in [facility] apt connected to the [facility] . Has services and meals. He continues to set up his own medications. NS    Allergies:  Allergies   Allergen Reactions     No Known Allergies        Medications:  Current Outpatient Medications   Medication Sig     acetaminophen (TYLENOL) 500 MG tablet Take 1,000 mg by mouth 3 times daily     Acetylcarnitine HCl (ACETYL L-CARNITINE PO) Take 1 capsule by mouth daily     albuterol (PROAIR HFA/PROVENTIL  HFA/VENTOLIN HFA) 108 (90 Base) MCG/ACT inhaler Inhale 2 puffs into the lungs 4 times daily as needed for shortness of breath / dyspnea or wheezing     aspirin (ASA) 81 MG chewable tablet Take 1 tablet by mouth daily     atorvastatin (LIPITOR) 40 MG tablet Take 1 tablet (40 mg) by mouth every evening     diltiazem ER COATED BEADS (CARDIZEM CD/CARTIA XT) 300 MG 24 hr capsule Take 1 capsule (300 mg) by mouth daily     Fluticasone-Umeclidin-Vilanterol (TRELEGY ELLIPTA) 200-62.5-25 MCG/INH oral inhaler Inhale 1 puff into the lungs daily     furosemide (LASIX) 20 MG tablet Take 1 tablet (20 mg) by mouth every evening     furosemide (LASIX) 40 MG tablet Take 1 tablet (40 mg) by mouth every morning     isosorbide mononitrate (IMDUR) 30 MG 24 hr tablet Take 1 tablet (30 mg) by mouth daily     levothyroxine (SYNTHROID/LEVOTHROID) 175 MCG tablet TAKE 1 TABLET(175 MCG) BY MOUTH DAILY     lisinopril (ZESTRIL) 20 MG tablet Take 0.5 tablets (10 mg) by mouth daily     metolazone (ZAROXOLYN) 2.5 MG tablet TAKE 1 TABLET(2.5 MG) BY MOUTH TWICE DAILY     Multiple vitamin TABS Take 1 tablet by mouth daily     polyethylene glycol (MIRALAX) 17 g packet Take 1 packet by mouth daily     potassium chloride ER (KLOR-CON M) 10 MEQ CR tablet TAKE 1 TABLET(10 MEQ) BY MOUTH DAILY     spironolactone (ALDACTONE) 25 MG tablet Take 1 tablet (25 mg) by mouth every other day     tamsulosin (FLOMAX) 0.4 MG capsule Take 1 capsule (0.4 mg) by mouth daily     TAURINE PO Take 1 capsule by mouth daily     traMADol (ULTRAM) 50 MG tablet Take 0.5 tablets (25 mg) by mouth 2 times daily as needed for severe pain (7-10)     venlafaxine (EFFEXOR) 75 MG tablet Take 1 tablet (75 mg) by mouth 2 times daily     warfarin ANTICOAGULANT (COUMADIN) 1 MG tablet Take 1.5 mg by mouth daily     warfarin ANTICOAGULANT (COUMADIN) 5 MG tablet Take 1 tablet (5 mg) by mouth daily Or as directed by the INR clinic. INR DUE for further refills.     No current facility-administered  medications for this visit.      Vitals:  Weight 248 #    Exam:  GEN: Pleasant male in NAD  RESP: Breathing is non labored. Speaking in full sentences  NEURO: A/O  PSYCH: Bright affect, engaging     LABS:   CMP  Recent Labs   Lab Test 01/02/23  1047 12/14/22  0957 10/18/22  0939 10/13/22  0934 09/19/22  1206 09/18/22  0751 09/18/22  0611 08/31/21  1401 06/21/21  1454 06/14/21  1455 04/21/21  0931 03/02/21  1405    137  --  138  --   --  139   < > 138 137 138 136   POTASSIUM 4.1 4.2 3.5 3.1*  --   --  4.1   < > 4.1 4.7 3.7 4.5   CHLORIDE 95* 97*  --  99  --   --  106   < > 103 105 99 104   CO2 28 29  --  31  --   --  27   < > 29 28 39* 28   ANIONGAP 14 11  --  8  --   --  6   < > 6 4 <1* 4   GLC 81 112*  --  110* 113*   < > 119*   < > 100* 108* 104* 93   BUN 25.5* 23.3*  --  43*  --   --  29   < > 64* 62* 44* 57*   CR 0.96 0.94  --  1.13  --   --  0.74   < > 1.57* 1.81* 1.02 1.42*   GFRESTIMATED 78 80  --  64  --   --  90   < > 40* 34* 68 46*   GFRESTBLACK  --   --   --   --   --   --   --   --  47* 39* 79 53*   LAWSON 9.7 9.2  --  9.5  --   --  9.4   < > 9.4 9.2 9.2 9.6    < > = values in this interval not displayed.     Recent Labs   Lab Test 08/02/22  0829 07/14/22  2327 05/23/22  1356 05/03/22  1656 03/27/22  1600   BILITOTAL 0.4 0.4 0.5 0.4 0.4   ALKPHOS 100 99 99 109 90   ALT 17 19  --  23 22   AST 13 27 16 17 14     CBC  Recent Labs   Lab Test 01/02/23  1047 12/14/22  0957 10/13/22  0934 09/18/22  0611 09/17/22  1350 08/02/22  0829   HGB 13.1* 12.5* 13.6 11.0* 12.0* 12.6*   WBC  --  10.2  --  12.3* 11.2* 10.1   RBC  --  4.07*  --  3.64* 4.02* 4.14*   HCT  --  40.0  --  34.8* 38.0* 39.6*   MCV  --  98  --  96 95 96   MCH  --  30.7  --  30.2 29.9 30.4   MCHC  --  31.3*  --  31.6 31.6 31.8   RDW  --  16.1*  --  16.2* 16.2* 14.8   PLT  --  220  --  205 205 283     URINE STUDIES  Recent Labs   Lab Test 07/15/22  0046 03/25/22  1855 06/14/21  1516 04/21/21  0853 12/21/20  1219 12/14/20  1706   COLOR Yellow Yellow  Yellow Yellow Yellow Yellow   APPEARANCE Clear Clear Cloudy Clear Clear Clear   URINEGLC Negative Negative Negative Negative Negative Negative   URINEBILI Negative Negative Negative Negative Negative Negative   URINEKETONE Negative Negative Negative Negative Negative Negative   SG 1.022 1.015 1.020 1.015 1.015 1.015   UBLD Negative Negative Small* Negative Negative Trace*   URINEPH 6.5 5.5 5.0 7.0 5.0 6.0   PROTEIN Negative Negative 30* Negative Negative 30*   UROBILINOGEN  --  0.2 0.2 0.2 0.2 0.2   NITRITE Negative Negative Negative Negative Negative Negative   LEUKEST Negative Negative Moderate* Negative Small* Small*   RBCU 1  --  Unable to quantitate other elements due to packed WBC's.*  --  O - 2 2-5*   WBCU 1  --  >100*  --  10-25* 25-50*     Recent Labs   Lab Test 05/23/22  1356 02/04/22  0902 10/28/21  1118 06/14/21  1516   UTPG 0.18 0.29* 0.44* 0.38*     PTH  Recent Labs   Lab Test 05/23/22  1356 06/14/21  1455   PTHI 92* 107*     IRON STUDIES  Recent Labs   Lab Test 08/02/22  0829 10/28/21  1118 10/20/21  1022   IRON  --  60  --    FEB  --  311  --    IRONSAT  --  19  --    SEGUNDO 58 81 78       Tiffany Suero, M[place]

## 2023-01-03 NOTE — PROGRESS NOTES
Jaun is a 82 year old who is being evaluated via a billable video visit.      How would you like to obtain your AVS? MyChart  If the video visit is dropped, the invitation should be resent by: Text to cell phone: [phone]  Will anyone else be joining your video visit? Varsha MUELLER    Video-Visit Details    Type of service:  Video Visit   Video Start Time: 0824  Video End Time:0838    Originating Location (pt. Location): Home    Distant Location (provider location):  home  Platform used for Video Visit: Quintin

## 2023-01-04 RX ORDER — TRAMADOL HYDROCHLORIDE 50 MG/1
25 TABLET ORAL 2 TIMES DAILY PRN
Qty: 30 TABLET | Refills: 0 | Status: SHIPPED | OUTPATIENT
Start: 2023-01-04 | End: 2023-02-06

## 2023-01-05 ENCOUNTER — HOSPITAL ENCOUNTER (OUTPATIENT)
Facility: AMBULATORY SURGERY CENTER | Age: 83
Discharge: HOME | End: 2023-01-05
Attending: ANESTHESIOLOGY | Admitting: ANESTHESIOLOGY
Payer: MEDICARE

## 2023-01-05 ENCOUNTER — ANCILLARY PROCEDURE (OUTPATIENT)
Dept: RADIOLOGY | Facility: AMBULATORY SURGERY CENTER | Age: 83
End: 2023-01-05
Attending: ANESTHESIOLOGY
Payer: MEDICARE

## 2023-01-05 VITALS
DIASTOLIC BLOOD PRESSURE: 73 MMHG | TEMPERATURE: 97.8 F | SYSTOLIC BLOOD PRESSURE: 127 MMHG | OXYGEN SATURATION: 92 % | RESPIRATION RATE: 16 BRPM

## 2023-01-05 DIAGNOSIS — R52 PAIN: ICD-10-CM

## 2023-01-05 PROCEDURE — 76942 ECHO GUIDE FOR BIOPSY: CPT | Mod: 26 | Performed by: ANESTHESIOLOGY

## 2023-01-05 PROCEDURE — 64418 NJX AA&/STRD SPRSCAP NRV: CPT | Mod: LT

## 2023-01-05 PROCEDURE — 64418 NJX AA&/STRD SPRSCAP NRV: CPT | Mod: LT | Performed by: ANESTHESIOLOGY

## 2023-01-05 RX ORDER — TRIAMCINOLONE ACETONIDE 40 MG/ML
INJECTION, SUSPENSION INTRA-ARTICULAR; INTRAMUSCULAR DAILY PRN
Status: DISCONTINUED | OUTPATIENT
Start: 2023-01-05 | End: 2023-01-05 | Stop reason: HOSPADM

## 2023-01-05 RX ORDER — LIDOCAINE HYDROCHLORIDE 10 MG/ML
INJECTION, SOLUTION EPIDURAL; INFILTRATION; INTRACAUDAL; PERINEURAL DAILY PRN
Status: DISCONTINUED | OUTPATIENT
Start: 2023-01-05 | End: 2023-01-05 | Stop reason: HOSPADM

## 2023-01-05 RX ORDER — BUPIVACAINE HYDROCHLORIDE 2.5 MG/ML
INJECTION, SOLUTION EPIDURAL; INFILTRATION; INTRACAUDAL DAILY PRN
Status: DISCONTINUED | OUTPATIENT
Start: 2023-01-05 | End: 2023-01-05 | Stop reason: HOSPADM

## 2023-01-05 RX ORDER — DIAZEPAM 5 MG
5-10 TABLET ORAL
Status: COMPLETED | OUTPATIENT
Start: 2023-01-05 | End: 2023-01-05

## 2023-01-05 NOTE — DISCHARGE INSTRUCTIONS
Home Care Instructions after an Upper Extremity nerve block      In an upper extremity nerve block, local anesthetic or  numbing  medication is injected around the nerves to anesthetize or  numb  the shoulder or arm. When used for chronic pain, it is hoped that this procedure will interrupt the nerve pathways and provide long term pain relief. If your arm is numb, you should be careful since it is possible to injure your arm without being aware of the injury. While your arm is numb, you should:  Avoid striking or bumping your arm  Avoid extreme hot or cold    Activity  -You may resume most normal activity levels with the exception of strenuous activity. It is important for us to know if your pain with normal activity is relieved after this injection.  -DO NOT shower for 24 hours  -DO NOT remove bandaid for 24 hours    Pain  -You may experience soreness at the injection site for one or two days  -You may use an ice pack for 20 minutes every 2 hours for the first 24 hours  -You may use a heating pad after the first 24 hours  -You may use Tylenol (acetaminophen) every 4 hours or other pain medicines as directed by your physician      Common side effects of steroids:  Not everyone will experience corticosteroid side effects. If side effects are experienced, they will gradually subside in the 7-10 day period following an injection. Most common side effects include:  -Flushed face and/or chest  -Feeling of warmth, particularly in the face but could be an overall feeling of warmth  -Increased blood sugar in diabetic patients  -Menstrual irregularities my occur. If taking hormone-based birth control an alternate method of birth control is recommended  -Sleep disturbances and/or mood swings are possible  -Leg cramps      PLEASE KEEP TRACK OF YOUR SYMPTOMS AND NOTE YOUR IMPROVEMENT FOR YOUR DOCTOR.     Please contact us if you have:  -Severe pain  -Fever more than 101.5 degrees Fahrenheit  -Signs of infection at the injection  site (redness, swelling, or drainage)    For PAIN CENTER patients:   If you have questions, please contact our office at [phone] between the hours of 7:00 am and 3:00 pm Monday through Friday. After office hours you can contact the on call provider by dialing [phone]. If you need immediate attention, we recommend that you go to a hospital emergency room or dial [phone].    FOR PM&R PATIENTS:  For patients seen by the PM and R service, please call [phone]. (Monday through Friday 8a-5p.  After business hours and weekends call [phone] and ask for the PM and R doctor on call). If you need to fax a pain diary to PM&R the fax number is [phone]. If you are unable to fax, uploading to [employer] is encouraged, then send to provider. If you have procedure scheduling questions please call [phone] Option #2.

## 2023-01-11 ENCOUNTER — MYC REFILL (OUTPATIENT)
Dept: FAMILY MEDICINE | Facility: CLINIC | Age: 83
End: 2023-01-11

## 2023-01-11 DIAGNOSIS — I89.0 LYMPHEDEMA: ICD-10-CM

## 2023-01-11 NOTE — OP NOTE
Patient: Erich Craven Age: 82 years old   MRN: [id number] Attending: Dr. Allen            PAIN MEDICINE CLINIC PROCEDURE NOTE     ATTENDING CLINICIAN:    Swetha Allen MD     ASSISTANT CLINICIAN:  Rohini Mcarthur MD        PREPROCEDURE DIAGNOSES:  1.  Left  shoulder pain  2.  Left rotator cuff tear     PROCEDURE(S) PERFORMED:  1.  Left  suprascapular nerve block  2.  Ultrasound guidance for the above-named procedure(s)        ANESTHESIA:  Local.     BLOOD LOSS:  Minimal.     DRAINS AND SPECIMENS:  None.     COMPLICATIONS:  None.     INDICATIONS:  Erich Craven is a 82 year old male with a history of  chronic shoulder pain secondary to rotator cuff tear.  The patient stated that the patient was in their usual state of health and denied recent anticoagulant use or recent infections.  Therefore, the plan is ti perform above mentioned procedure.      Procedure Details:     The patient was met in the procedure room, where the patient was identified by name, medical record number and date of birth.  All of the patient s last minute questions were answered. Written informed consent was obtained and saved in the electronic medical record, after the risks, benefits, and alternatives were discussed with the patient.       A formal time-out procedure was performed, as per protocol, including patient name, title of procedure, and site of procedure, and all in the room concurred.  Routine monitors were applied.       The patient was placed in the sitting position on the procedure room table.Routine monitors were placed.  Vital signs were stable.     A chlorhexidine prep was completed followed by sterile draping per standard procedure.  Patient's consent was obtained.  The left  Suprascapular area was prepped using Chloraprep and the area was sterilely draped.  The ultrasound probe was draped and the U/S was used to identify and confirm the depth.  Fascial planes were easily visible as well as subcutaneous tissue and the  supraspinatus fossae with the scapula as the deep border.  A 21G 100 mm  pajunk needle was used in an in-plane fashion and positioned deep to the hyperechoic suprascapular ligament taking care to avoid the visibly pulsatile suprascapular artery.   ~1ml of the steroid local anesthetic was injected to confirm correct placement of the needle tip prior to injection of the remaining 5 mls of 0.25% bupivacaine 4.5 ml and 20 mg( 0.5 ml) of triamcinolone  into the area, with several aspirations being negative for blood.  A screen shot,was taken of nicely extravasating fluid into the the expected location of the supraspinatus nerve.  The needle was removed, and a band-aid was applied.           Light pressure was held at the puncture site(s) to prevent ecchymosis and oozing.  The patient's skin was cleansed, and hemostasis was confirmed.  Band-aids were applied to the needle injection site(s).       Condition:     The patient remained awake and alert throughout the procedure.  The patient tolerated the procedure well and was monitored for approximately 15 minutes afterward in the post procedure area.  There were no immediate post procedure complications noted.  The patient was then discharged to home as per protocol.       Pre-procedure pain score: 3/10  Post-procedure pain score: 0/10

## 2023-01-12 ENCOUNTER — DOCUMENTATION ONLY (OUTPATIENT)
Dept: FAMILY MEDICINE | Facility: CLINIC | Age: 83
End: 2023-01-12

## 2023-01-12 ENCOUNTER — LAB (OUTPATIENT)
Dept: LAB | Facility: CLINIC | Age: 83
End: 2023-01-12
Payer: MEDICARE

## 2023-01-12 ENCOUNTER — ANTICOAGULATION THERAPY VISIT (OUTPATIENT)
Dept: ANTICOAGULATION | Facility: CLINIC | Age: 83
End: 2023-01-12

## 2023-01-12 DIAGNOSIS — I82.4Z9 DEEP VEIN THROMBOSIS (DVT) OF DISTAL VEIN OF LOWER EXTREMITY, UNSPECIFIED CHRONICITY, UNSPECIFIED LATERALITY (H): ICD-10-CM

## 2023-01-12 DIAGNOSIS — Z79.01 LONG TERM CURRENT USE OF ANTICOAGULANTS WITH INR GOAL OF 2.0-3.0: Primary | ICD-10-CM

## 2023-01-12 DIAGNOSIS — I26.99 PE (PULMONARY THROMBOEMBOLISM) (H): Primary | ICD-10-CM

## 2023-01-12 DIAGNOSIS — I48.0 PAROXYSMAL A-FIB (H): ICD-10-CM

## 2023-01-12 DIAGNOSIS — I26.99 PE (PULMONARY THROMBOEMBOLISM) (H): ICD-10-CM

## 2023-01-12 DIAGNOSIS — Z79.01 LONG TERM CURRENT USE OF ANTICOAGULANTS WITH INR GOAL OF 2.0-3.0: ICD-10-CM

## 2023-01-12 LAB — INR BLD: 1.4 (ref 0.9–1.1)

## 2023-01-12 PROCEDURE — 85610 PROTHROMBIN TIME: CPT

## 2023-01-12 PROCEDURE — 36416 COLLJ CAPILLARY BLOOD SPEC: CPT

## 2023-01-12 RX ORDER — SPIRONOLACTONE 25 MG/1
25 TABLET ORAL EVERY OTHER DAY
Qty: 45 TABLET | Refills: 0 | Status: SHIPPED | OUTPATIENT
Start: 2023-01-12 | End: 2023-04-11

## 2023-01-12 NOTE — PROGRESS NOTES
ANTICOAGULATION CLINIC REFERRAL RENEWAL REQUEST       An annual renewal order is required for all patients referred to [facility].?  Please review and sign the pended referral order for Erich Craven.       ANTICOAGULATION SUMMARY      Warfarin indication(s)   Atrial Fibrillation, DVT and PE    Mechanical heart valve present?  NO       Current goal range   INR: 2.0-3.0     Goal appropriate for indication? Goal INR 2-3, standard for indication(s) above     Time in Therapeutic Range (TTR)  (Goal > 60%) 45.9%       Office visit with referring provider's group within last year yes on 12.13.22 with Erich Duarte, vv with Dr. Heath (Primary Care Provider) on 8.1.22       Paola Bee, MARIA DE JESUS  [facility]

## 2023-01-12 NOTE — TELEPHONE ENCOUNTER
Patient had lft suprascapular nerve bloock 1/5/22 and had a hold plan for 5-day warfarin hold, no bridge. Advised to take booster dose of 10 mg x 2 (total boost of 7.5 mg over two days) then return to maintenance dose and recheck INR in 1 week.     Since this request was sent so long ago, just bringing it forward now to see if same plan will hold for next procedure?    Paola BONDS RN  Anticoagulation Team

## 2023-01-12 NOTE — PROGRESS NOTES
ANTICOAGULATION MANAGEMENT     Erich Craven 82 year old male is on warfarin with subtherapeutic INR result. (Goal INR 2.0-3.0)    Recent labs: (last 7 days)     01/12/23  0801   INR 1.4*       ASSESSMENT       Source(s): Chart Review and Patient/Caregiver Call       Warfarin doses taken: Warfarin taken as instructed, including booster doses after 5-day hold and procedure last week    Diet: No new diet changes identified    New illness, injury, or hospitalization: Yes, left suprascapular nerve block 1/5/23 with 5-day hold, no bridge.    Medication/supplement changes: None noted    Signs or symptoms of bleeding or clotting: No    Previous INR: Therapeutic last visit; previously outside of goal range    Additional findings: Upcoming surgery/procedure 2/21/23 total left shoulder arthroplasty. [facility] sent to [facility] for hold plan 12/14/23.       PLAN     Recommended plan for temporary change(s) affecting INR     Dosing Instructions: booster dose then continue your current warfarin dose with next INR in 5-7 days. Patient is aware if still subtherapeutic next week that maintenance dose will need increasing. It was increased 12/1/22 after a couple subtherapeutic INRs and came up to 2.1. May need further increase, but at this time patient could also just need more time to come up from 5-day hold. Patient stated understanding and is in agreement with plan.      Summary  As of 1/12/2023    Full warfarin instructions:  1/12: 12.5 mg; Otherwise 7.5 mg every Thu; 5 mg all other days   Next INR check:  1/18/2023             Telephone call with Jaun who agrees to plan and repeated back plan correctly    Lab visit scheduled    Education provided:     Please call back if any changes to your diet, medications or how you've been taking warfarin    Contact [phone]  with any changes, questions or concerns.     Plan made per [facility] anticoagulation protocol    Paola Bee RN  Anticoagulation  Clinic  1/12/2023    _______________________________________________________________________     Anticoagulation Episode Summary     Current INR goal:  2.0-3.0   TTR:  46.9 % (1 y)   Target end date:  Indefinite   Send INR reminders to:  [facility]    Indications    PE (pulmonary thromboembolism) (H) [I26.99]  Paroxysmal A-fib (H) [I48.0]  Deep vein thrombosis (DVT) of distal vein of lower extremity  unspecified chronicity  unspecified laterality (H) [I82.4Z9]  Long term current use of anticoagulants with INR goal of 2.0-3.0 [Z79.01]           Comments:           Anticoagulation Care Providers     Provider Role Specialty Phone number    Dontrell Gómez MD Referring Internal Medicine [phone]    Anne Heath MD Referring Internal Medicine [phone]

## 2023-01-18 ENCOUNTER — ANTICOAGULATION THERAPY VISIT (OUTPATIENT)
Dept: ANTICOAGULATION | Facility: CLINIC | Age: 83
End: 2023-01-18

## 2023-01-18 ENCOUNTER — LAB (OUTPATIENT)
Dept: LAB | Facility: CLINIC | Age: 83
End: 2023-01-18
Payer: MEDICARE

## 2023-01-18 DIAGNOSIS — I48.0 PAROXYSMAL A-FIB (H): ICD-10-CM

## 2023-01-18 DIAGNOSIS — Z79.01 LONG TERM CURRENT USE OF ANTICOAGULANTS WITH INR GOAL OF 2.0-3.0: ICD-10-CM

## 2023-01-18 DIAGNOSIS — I82.4Z9 DEEP VEIN THROMBOSIS (DVT) OF DISTAL VEIN OF LOWER EXTREMITY, UNSPECIFIED CHRONICITY, UNSPECIFIED LATERALITY (H): ICD-10-CM

## 2023-01-18 DIAGNOSIS — I26.99 PE (PULMONARY THROMBOEMBOLISM) (H): ICD-10-CM

## 2023-01-18 DIAGNOSIS — I26.99 PE (PULMONARY THROMBOEMBOLISM) (H): Primary | ICD-10-CM

## 2023-01-18 LAB — INR BLD: 1.9 (ref 0.9–1.1)

## 2023-01-18 PROCEDURE — 36416 COLLJ CAPILLARY BLOOD SPEC: CPT

## 2023-01-18 PROCEDURE — 85610 PROTHROMBIN TIME: CPT

## 2023-01-18 NOTE — PROGRESS NOTES
ANTICOAGULATION MANAGEMENT     Erich Craven 82 year old male is on warfarin with subtherapeutic INR result. (Goal INR 2.0-3.0)    Recent labs: (last 7 days)     01/18/23  1103   INR 1.9*       ASSESSMENT       Source(s): Chart Review and Patient/Caregiver Call       Warfarin doses taken: Warfarin taken as instructed    Diet: No new diet changes identified    New illness, injury, or hospitalization: No    Medication/supplement changes: None noted    Signs or symptoms of bleeding or clotting: No    Previous INR: Subtherapeutic    Additional findings: None       PLAN     Recommended plan for no diet, medication or health factor changes affecting INR     Dosing Instructions: Increase your warfarin dose (6.7% change) with next INR in 2 weeks       Summary  As of 1/18/2023    Full warfarin instructions:  7.5 mg every Wed, Sat; 5 mg all other days   Next INR check:  2/1/2023             Telephone call with Jaun who verbalizes understanding and agrees to plan    Lab visit scheduled    Education provided:     Please call back if any changes to your diet, medications or how you've been taking warfarin    Contact [phone]  with any changes, questions or concerns.     Plan made per [facility] anticoagulation protocol    Paola Bee RN  Anticoagulation Clinic  1/18/2023    _______________________________________________________________________     Anticoagulation Episode Summary     Current INR goal:  2.0-3.0   TTR:  46.9 % (1 y)   Target end date:  Indefinite   Send INR reminders to:  [facility]    Indications    PE (pulmonary thromboembolism) (H) [I26.99]  Paroxysmal A-fib (H) [I48.0]  Deep vein thrombosis (DVT) of distal vein of lower extremity  unspecified chronicity  unspecified laterality (H) [I82.4Z9]  Long term current use of anticoagulants with INR goal of 2.0-3.0 [Z79.01]           Comments:           Anticoagulation Care Providers     Provider Role Specialty Phone number    Dontrell Gómez MD Referring  Internal Medicine [phone]    Anne Heath MD Referring Internal Medicine [phone]

## 2023-01-18 NOTE — PROGRESS NOTES
Called patient. No answer. No consent to communicate on file. Left message to return call to this RN.    Last INR also subtherapeutic, though 1 week after 5-day hold for invasive procedure. Gave booster dose and allowed another week for INR to rise. Still slightly subtherapeutic today (INRs prior to procedure were on low end of therapeutic range. If, after RN assessment, no temporary factors, then will plan to increase maintenance dose by 6.7% by increasing to 7.5 mg Wed and Sat; 5 mg all other days.    Paola BONDS RN  Anticoagulation Team

## 2023-01-23 NOTE — PROGRESS NOTES
Anticoagulation Note - Preoperative Assessment Center (PAC) Pharmacist     Patient was interviewed on January 23, 2023 as a part of PAC clinic appointment. The purpose of this note is to document the perioperative anticoagulation plan outlined by the providers caring for Erich Craven.     Current Regimen  Anticoagulation Regimen as of January 23, 2023: warfarin - take 7.5 mg every Wed, Sat; 5 mg all other days of the week. Takes in the morning.   Indication: DVT/PE, afib   Prescriber:  Dr. Gómez (managed by [facility]).   Expected Duration of therapy: indefinite   INR Goal: 2-3  Creatinine   Date Value Ref Range Status   01/02/2023 0.96 0.67 - 1.17 mg/dL Final   06/21/2021 1.57 (H) 0.66 - 1.25 mg/dL Final   CrCl ~75 mL/min     Perioperative plan  Erich Craven is scheduled for ARTHROPLASTY, SHOULDER, TOTAL, REVERSE LEFT on 2/21/23 with Dr. Vasquez and the perioperative anticoagulation plan will be outlined by his [facility].  They are aware of this surgery date per Telephone enc. Dated 12/14 and 1/12).  Please see their future notes for full details.        Resumption of anticoagulation after procedure will be based on surgery team assessment of bleeding risks and complications.  This plan may require re-assessment and modification by his primary team in the perioperative setting depending on patients clinical situation.        Charly Carreon RPH  January 23, 2023  1:25 PM

## 2023-01-24 ENCOUNTER — PRE VISIT (OUTPATIENT)
Dept: SURGERY | Facility: CLINIC | Age: 83
End: 2023-01-24

## 2023-01-24 ENCOUNTER — ANESTHESIA EVENT (OUTPATIENT)
Dept: SURGERY | Facility: CLINIC | Age: 83
End: 2023-01-24

## 2023-01-24 ENCOUNTER — VIRTUAL VISIT (OUTPATIENT)
Dept: SURGERY | Facility: CLINIC | Age: 83
End: 2023-01-24
Payer: MEDICARE

## 2023-01-24 DIAGNOSIS — M75.102 LEFT ROTATOR CUFF TEAR ARTHROPATHY: ICD-10-CM

## 2023-01-24 DIAGNOSIS — Z01.818 PREOP EXAMINATION: Primary | ICD-10-CM

## 2023-01-24 DIAGNOSIS — M12.812 LEFT ROTATOR CUFF TEAR ARTHROPATHY: ICD-10-CM

## 2023-01-24 PROCEDURE — 99215 OFFICE O/P EST HI 40 MIN: CPT | Mod: 95 | Performed by: NURSE PRACTITIONER

## 2023-01-24 ASSESSMENT — LIFESTYLE VARIABLES: TOBACCO_USE: 1

## 2023-01-24 ASSESSMENT — COPD QUESTIONNAIRES
COPD: 1
CAT_SEVERITY: MODERATE

## 2023-01-24 ASSESSMENT — ENCOUNTER SYMPTOMS: ORTHOPNEA: 0

## 2023-01-24 NOTE — PATIENT INSTRUCTIONS
Preparing for Your Surgery      Name:  Erich Craven   MRN:  [id number]   :  1940   Today's Date:  2023     BRING [facility] TO HOS[facility]    Arriving for surgery:  Surgery date:  23  Arrival time:  05:30 am     Surgeries and procedures: Adult patients can have 2 visitors all through the surgery process.     Visiting hours: 8 a.m. to 8:30 p.m.     Hospital: Adult patients and children under age 18 can have 4 visitor at a time     No visitors under the age of 5 are allowed for hospital patients.  Double occupancy rooms: Patients can have only two visitors at a time.     Patients with disabilities: Can have a support person with them (family member, service provider     Or someone well informed about their needs) plus the allowed number of visitors     Patients confirmed or suspected to have symptoms of COVID 19 or flu:     No visitors allowed for adult patients.   Children (under age 18) can have 1 named visitor.     People who are sick or showing symptoms of COVID 19 or flu:    Are not allowed to visit patients--we can only make exceptions in special situations.       Please follow these guidelines for your visit:   Arrive wearing a mask over your mouth and nose; we will give you a medical mask to wear    If you arrive wearing a cloth mask.   Keep it on during your entire visit, even when in patient's room.   If you don't wear a mask we'll ask you to leave.     Clean your hands with alcohol hand . Do this when you arrive at and leave the building and patient room,    And again after you touch your mask or anything in the room.     You can t visit if you have a fever, cough, shortness of breath, muscle aches, headaches, sore throat    Or diarrhea      Stay 6 feet away from others during your visit and between visits     Go directly to and from the room you are visiting.     Stay in the patient s room during your visit. Limit going to other places in the hospital as much as possible     Leave  bags and jackets at home or in the car.     For everyone s health, please don t come and go during your visit. That includes for smoking   during your visit.     Please come to:      [facility] [address]  [address] [place]  [address]    - parking is available in front of [facility] from 5:15AM to 8:00PM. If you prefer, park your car in the Green Lot.    -Proceed to the 3rd floor, check in at the Adult Surgery Waiting Lounge. [phone]    If an escort is needed stop at the Information Desk in the lobby. Inform the information person that you are here for surgery. An escort to the Adult Surgery Waiting Lounge will be provided.    What can I eat or drink?  -  You may eat and drink normally up to 8 hours prior to arrival time.   -  You may have clear liquids until 2 hours prior to arrival time.    Examples of clear liquids:  Water  Clear broth  Juices (apple, white grape, white cranberry  and cider) without pulp  Noncarbonated, powder based beverages  (lemonade and Jefry-Aid)  Sodas (Sprite, 7-Up, ginger ale and seltzer)  Coffee or tea (without milk or cream)  Gatorade    -  No Alcohol for at least 24 hours before surgery.     Which medicines can I take?  Hold Aspirin for 7 days before surgery.   Hold Multivitamins for 7 days before surgery.  Hold Supplements for 7 days before surgery.  Hold Ibuprofen (Advil, Motrin) for 1 day before surgery--unless otherwise directed by surgeon.  Hold Naproxen (Aleve) for 4 days before surgery.    HOLD COUMADIN (WARFARIN) X 5 DAYS BEFORE SURGERY.    -  PLEASE TAKE these medications the day of surgery:  Tylenol if needed; take morning medications.  Bring inhalers to hospital if using.    How do I prepare myself?  - Please take 2 showers before surgery using Scrubcare or Hibiclens soap.    Use this soap only from the neck to your toes.     Leave the soap on your skin for one minute--then rinse  thoroughly.      You may use your own shampoo and conditioner. No other hair products.   - Please remove all jewelry and body piercings.  - No lotions, deodorants or fragrance.  - No makeup or fingernail polish.   - Bring your ID and insurance card.    -If you have a Deep Brain Stimulator, Spinal Cord Stimulator, or any Neuro Stimulator device---you must bring the remote control to the hospital.      ALL PATIENTS GOING HOME THE SAME DAY OF SURGERY ARE REQUIRED TO HAVE A RESPONSIBLE ADULT TO DRIVE AND BE IN ATTENDANCE WITH THEM FOR 24 HOURS FOLLOWING SURGERY.    Covid testing policy as of 12/06/2022  Your surgeon will notify and schedule you for a COVID test if one is needed before surgery--please direct any questions or COVID symptoms to your surgeon      Questions or Concerns:    - For any questions regarding the day of surgery or your hospital stay, please contact the Pre Admission Nursing Office at [phone].       - If you have health changes between today and your surgery, please call your surgeon.       - For questions after surgery, please call your surgeons office.

## 2023-01-24 NOTE — H&P
Pre-Operative H & P     CC:  Preoperative exam to assess for increased cardiopulmonary risk while undergoing surgery and anesthesia.    Date of Encounter: 1/24/2023  Primary Care Physician:  Anne Heath     Reason for visit:   Encounter Diagnoses   Name Primary?     Preop examination Yes     Left rotator cuff tear arthropathy        HPI  Erich Craven is a 82 year old male who presents for pre-operative H & P in preparation for  Procedure Information     Date/Time: 2/21/23 @ 0800     Procedure: ARTHROPLASTY, SHOULDER, TOTAL, REVERSE LEFT    Anesthesia type: choice    Pre-op diagnosis: left rotator cuff tear arthropathy    Location: [facility]    Providers: Dr. Vasquez          Erich Craven is a 82 year old male with hyperlipidemia, hypertension, atrial fibrillation, CAD, CHF, pacemaker, COPD, sleep apnea, history of PE and DVT, obesity, alcohol dependence in remission and BPH that has left rotator cuff tear arthropathy.  He has left shoulder pain and decreased left shoulder ROM.  He has done physical therapy and had injections.  Neither resulted in any longstanding relief of his symptoms.  He has consulted with Dr. Vasquez and the above listed procedure has been recommended for treatment.     History is obtained from the patient and chart review    Hx of abnormal bleeding or anti-platelet use: aspirin      Past Medical History  Past Medical History:   Diagnosis Date     (HFpEF) heart failure with preserved ejection fraction (H)      Arthritis      BPH with urinary obstruction      CAD (coronary artery disease)     PCI w/ SUMMER in 2014     Cardiac pacemaker in situ      Chronic diastolic heart failure (H)      Complication of anesthesia     post-op DVT 2012 after knee surgery     COPD (chronic obstructive pulmonary disease) (H)      Diabetes (H)      DVT (deep venous thrombosis) (H)      Dyspnea      Essential hypertension      Fatigue      HLD  (hyperlipidemia)      Hx of long term use of blood thinners     Due to PE. Started in 2012     Hypothyroidism      Lymphedema      Mumps      Obesity, Class III, BMI 40-49.9 (morbid obesity) (H)      RIN (obstructive sleep apnea)     has refused CPAP     Pacemaker      Paroxysmal A-fib (H)      PE (pulmonary thromboembolism) (H)        Past Surgical History  Past Surgical History:   Procedure Laterality Date     cardiac stenting       CV RIGHT HEART CATH MEASUREMENTS RECORDED N/A 11/5/2019    Procedure: Right Heart Cath;  Surgeon: Roberto Hernandez MD;  [facility]     ENT SURGERY      tonsillectomy     IMPLANT PACEMAKER       INJECT NERVE BLOCK SUPRASCAPULAR Bilateral 8/26/2021    Procedure: BLOCK, NERVE, SUPRASCAPULAR, Bilateral, under ultrasound guidance;  Surgeon: Swetha Allen MD;  Location: [facility] OR     INJECT NERVE BLOCK SUPRASCAPULAR Bilateral 9/23/2021    Procedure: bilateral suprascapular nerve block;  Surgeon: Swetha Allen MD;  Location: [facility] OR     INJECT NERVE BLOCK SUPRASCAPULAR Left 9/15/2022    Procedure: BLOCK, NERVE, SUPRASCAPULAR- left;  Surgeon: Swetha Allen MD;  Location: [facility] OR     INJECT NERVE BLOCK SUPRASCAPULAR Left 10/20/2022    Procedure: BLOCK, NERVE, SUPRASCAPULAR- left;  Surgeon: Swetha Allen MD;  Location: [facility] OR     INJECT NERVE BLOCK SUPRASCAPULAR Left 1/5/2023    Procedure: BLOCK, NERVE, SUPRASCAPULAR (left);  Surgeon: Swetha Allen MD;  Location: [facility] OR     NERVE BLOCK PERIPHERAL Bilateral 3/10/2022    Procedure: bilateral suprascapular nerve block;  Surgeon: Swetha Allen MD;  Location: [facility] OR     ORTHOPEDIC SURGERY Right 2012    knee replacement       Prior to Admission Medications  Current Outpatient Medications   Medication Sig Dispense Refill     acetaminophen (TYLENOL) 500 MG tablet Take 1,000 mg by mouth 3 times daily       Acetylcarnitine HCl (ACETYL L-CARNITINE PO) Take 1 capsule by mouth daily       albuterol (PROAIR  HFA/PROVENTIL HFA/VENTOLIN HFA) 108 (90 Base) MCG/ACT inhaler Inhale 2 puffs into the lungs 4 times daily as needed for shortness of breath / dyspnea or wheezing       aspirin (ASA) 81 MG chewable tablet Take 1 tablet by mouth daily       atorvastatin (LIPITOR) 40 MG tablet Take 1 tablet (40 mg) by mouth every evening 90 tablet 3     diltiazem ER COATED BEADS (CARDIZEM CD/CARTIA XT) 300 MG 24 hr capsule Take 1 capsule (300 mg) by mouth daily 90 capsule 3     Fluticasone-Umeclidin-Vilanterol (TRELEGY ELLIPTA) 200-62.5-25 MCG/INH oral inhaler Inhale 1 puff into the lungs daily       furosemide (LASIX) 20 MG tablet Take 1 tablet (20 mg) by mouth every evening (Patient taking differently: Take 20 mg by mouth every evening Also takes 40 mg in the morning (see other order)) 30 tablet 0     furosemide (LASIX) 40 MG tablet Take 1 tablet (40 mg) by mouth every morning (Patient taking differently: Take 40 mg by mouth every morning Also takes 20 mg in the evening (see other order)) 90 tablet 1     isosorbide mononitrate (IMDUR) 30 MG 24 hr tablet Take 1 tablet (30 mg) by mouth daily 90 tablet 3     levothyroxine (SYNTHROID/LEVOTHROID) 175 MCG tablet TAKE 1 TABLET(175 MCG) BY MOUTH DAILY 90 tablet 2     lisinopril (ZESTRIL) 20 MG tablet Take 0.5 tablets (10 mg) by mouth daily 45 tablet 3     metolazone (ZAROXOLYN) 2.5 MG tablet TAKE 1 TABLET(2.5 MG) BY MOUTH TWICE DAILY 180 tablet 2     Multiple vitamin TABS Take 1 tablet by mouth daily       polyethylene glycol (MIRALAX) 17 g packet Take 1 packet by mouth daily       potassium chloride ER (KLOR-CON M) 10 MEQ CR tablet TAKE 1 TABLET(10 MEQ) BY MOUTH DAILY 90 tablet 0     spironolactone (ALDACTONE) 25 MG tablet Take 1 tablet (25 mg) by mouth every other day 45 tablet 0     tamsulosin (FLOMAX) 0.4 MG capsule Take 1 capsule (0.4 mg) by mouth daily 30 capsule 0     TAURINE PO Take 1 capsule by mouth daily       traMADol (ULTRAM) 50 MG tablet Take 0.5 tablets (25 mg) by mouth 2  times daily as needed for severe pain (7-10) 30 tablet 0     venlafaxine (EFFEXOR) 75 MG tablet Take 1 tablet (75 mg) by mouth 2 times daily 180 tablet 1     warfarin ANTICOAGULANT (COUMADIN) 5 MG tablet Take 1 tablet (5 mg) by mouth daily Or as directed by the INR clinic. INR DUE for further refills. (Patient taking differently: As of 2023 - take 7.5 mg every Wed, Sat; 5 mg all other days of the week. Takes in the morning.  Or as directed by the INR clinic. INR DUE for further refills.) 30 tablet 0       Allergies  Allergies   Allergen Reactions     No Known Allergies        Social History  Social History     Socioeconomic History     Marital status:      Spouse name: Not on file     Number of children: 5     Years of education: Not on file     Highest education level: Not on file   Occupational History     Occupation: retired teacher at [employer]   Tobacco Use     Smoking status: Former     Packs/day: 0.50     Years: 25.00     Pack years: 12.50     Types: Cigarettes     Start date:      Quit date:      Years since quittin.0     Smokeless tobacco: Never   Vaping Use     Vaping Use: Never used   Substance and Sexual Activity     Alcohol use: No     Comment: quit in ; recovering     Drug use: No     Sexual activity: Not Currently     Partners: Female   Other Topics Concern     Parent/sibling w/ CABG, MI or angioplasty before 65F 55M? Not Asked   Social History Narrative     Not on file     Social Determinants of Health     Financial Resource Strain: Not on file   Food Insecurity: Not on file   Transportation Needs: Not on file   Physical Activity: Not on file   Stress: Not on file   Social Connections: Not on file   Intimate Partner Violence: Not on file   Housing Stability: Not on file       Family History  Family History   Problem Relation Age of Onset     Brain Cancer Mother      Cerebrovascular Disease Father      Hypertension Father      Anesthesia Reaction No family  hx of      Thrombosis No family hx of        Review of Systems  The complete review of systems is negative other than noted in the HPI or here.   Anesthesia Evaluation   Pt has had prior anesthetic.     History of anesthetic complications   post-op DVT 2012.    ROS/MED HX  ENT/Pulmonary:     (+) sleep apnea (uses bi-pap), tobacco use, Past use, moderate,  COPD,  (-) recent URI   Neurologic:  - neg neurologic ROS     Cardiovascular: Comment: Venous stasis- no current edema, no wounds    (+) hypertension--CAD --stent-2014. 2 Taking blood thinners CHF pacemaker, Previous cardiac testing   Echo: Date: Results:  See H&P  Stress Test: Date: Results:  See H&P  ECG Reviewed: Date: Results:    Cath: Date: Results:   (-) MARTÍNEZ and orthopnea/PND   METS/Exercise Tolerance: 4 - Raking leaves, gardening    Hematologic:     (+) History of blood clots, pt is anticoagulated, anemia,  (-) history of blood transfusion   Musculoskeletal: Comment: Left rotator cuff tear arthropathy, chronic left shoulder pain, left shoulder OA - physical therapy not helpful, injections help only short periods of time      S/p right shoulder replacement last year - good ROM    Uses a cane for walks outdoors with his dog just in case the dog pulls.  No recent falls  (+) arthritis,     GI/Hepatic:  - neg GI/hepatic ROS     Renal/Genitourinary: Comment: Urinary frequency    (+) BPH,     Endo: Comment: prediabetes    (+) thyroid problem, hypothyroidism, Obesity,     Psychiatric/Substance Use:     (+) alcohol abuse     Infectious Disease:  - neg infectious disease ROS     Malignancy:   (+) Malignancy, History of Skin.Skin CA Remission status post Surgery.        Other:  - neg other ROS    (+) , H/O Chronic Pain,        Virtual visit -  No vitals were obtained    Physical Exam  Constitutional: Awake, alert, cooperative, no apparent distress, and appears stated age.  Eyes: Pupils equal  HENT: Normocephalic  Respiratory: non labored breathing   Neurologic: Awake,  alert, oriented to name, place and time.   Neuropsychiatric: Calm, cooperative. Normal affect.      Prior Labs/Diagnostic Studies   All labs and imaging personally reviewed     Device check  11/16/22  Holaira Advantio (D) Pacemaker Device Check  Patient seen in clinic for device evaluation and iterative programming.   AP: 11    %    : <1 %    Mode: DDDR     Underlying Rhythm: SR in the 90's     Heart Rate: Stable with HR's     Sensing: WNL    Pacing Threshold: Stable    Impedance: Stable    Battery Status: 2yrs    Device Site: Healed    Atrial Arrhythmia: 4 mode switches with 3 EGM's available showing afib with V-rates in the 100-110's lasting only 5s.  Pt. Does not recall episodes.   Ventricular Arrhythmia: 11 VHR logged with EGM's showing burst of afib with RVR last 10-20s with V-rates in the 150's-160's.  Hx of PAF and on Warfarin.   Setting Change: Changed RV amplitude from Auto@0.4ms to 3.2V@0.4ms based on today's testing while maintaining an adequate safety margin per clinic protocol.   Care Plan: Remote 2/21/23, follow-up with  Brent Portillo MD today.      VICTOR HUGO Sarmiento RN     Stress test  2021  A nuclear stress test was performed on 11/18/2021 and this showed a mild degree of ischemia in the basal inferolateral segment and also a small area of nontransmural infarction in the mid to basal inferior wall and inferoseptal segment.  EF was normal at 67% at that time.    Echocardiogram:    2/2/23  Interpretation Summary     1. Normal left ventricular size and systolic function. Left ventricular  ejection fraction is 60 to 65%.  2. No regional wall motion abnormality.  3. No hemodynamically significant valvular disease.  4. Mild aortic root dilatation. The ascending aorta is Mildly dilated.  No prior study for comparison. There is no comparison study available.      Labs:    Component      Latest Ref Rng & Units 2/2/2023   Sodium      136 - 145 mmol/L 141   Potassium      3.4 - 5.3  "mmol/L 4.0   Chloride      98 - 107 mmol/L 98   Carbon Dioxide (CO2)      22 - 29 mmol/L 33 (H)   Anion Gap      7 - 15 mmol/L 10   Urea Nitrogen      8.0 - 23.0 mg/dL 36.6 (H)   Creatinine      0.67 - 1.17 mg/dL 1.04   Calcium      8.8 - 10.2 mg/dL 9.9   Glucose      70 - 99 mg/dL 97   GFR Estimate      >60 mL/min/1.73m2 71   WBC      4.0 - 11.0 10e3/uL 9.1   RBC Count      4.40 - 5.90 10e6/uL 4.33 (L)   Hemoglobin      13.3 - 17.7 g/dL 13.0 (L)   Hematocrit      40.0 - 53.0 % 41.3   MCV      78 - 100 fL 95   MCH      26.5 - 33.0 pg 30.0   MCHC      31.5 - 36.5 g/dL 31.5   RDW      10.0 - 15.0 % 14.7   Platelet Count      150 - 450 10e3/uL 173         The patient's records and results personally reviewed by this provider.     Outside records reviewed from: Care Everywhere      Assessment      Erich Craven is a 82 year old male seen as a PAC referral for risk assessment and optimization for anesthesia.    Plan/Recommendations  Pt will be optimized for the proposed procedure.  See below for details on the assessment, risk, and preoperative recommendations    NEUROLOGY  - No history of TIA, CVA or seizure    -Post Op delirium risk factors:  Age and High co-morbid index    ENT  - No current airway concerns.  Will need to be reassessed day of surgery.  Mallampati: Unable to assess  TM: Unable to assess    CARDIAC  - following with cardiology for all above listed cardiac conditions.  - s/p coronary stenting in 2014  - pacemaker in place.  Device nurse will be notified of upcoming surgery by PAC RN.  - on warfarin for A-fib.  ChadsVasc = 5       Note excerpt from last cardiology visit with Dr. Portillo on 11/16/22:  \"With that background in mind, the patient is feeling well.  He has no chest pains, no chest pressure and no unusual shortness of breath.  He has no history of severe obstructive valvular heart disease and this is consistent with the physical examination.  Also, this patient does not have history of " "malignant dysrhythmias, nor does he have history of decompensated congestive heart failure, nor unstable angina pectoris and based upon this, his risk of an intraoperative cardiac event during his shoulder surgery would be low.\"    An echocardiogram was ordered at this visit that hasn't been completed yet.  Patient instructed to complete prior to surgery.  Will have [facility] help him arrange.     Patient was instructed to cardiology to take all of his cardiac medications prior to surgery, so he will continue imdur, metolazone, diltiazem, lisinopril, aldactone and lasix with no interruption prior to surgery.    - METS (Metabolic Equivalents) =4  Patient performs 4 or more METS exercise without symptoms            Total Score: 0      RCRI-Moderate risk: Class 3  6.6% complication rate            Total Score: 2    RCRI: CAD    RCRI: CHF        PULMONARY  - Obstructive Sleep Apnea  RIN with home bipap.  Patient will be instructed to bring their home bipap device to the hospital with them.    - COPD  Well controlled  - Tobacco History      History   Smoking Status     Former     Packs/day: 0.50     Years: 25.00     Types: Cigarettes     Start date: 1964     Quit date: 1989   Smokeless Tobacco     Never       GI  - denies GERD  PONV Medium Risk  Total Score: 2           1 AN PONV: Patient is not a current smoker    1 AN PONV: Intended Post Op Opioids        /RENAL  - noted diagnosis of BPH.  On flomax.  Denies any symptoms of urinary retention.  Monitor closely post-op.     ENDOCRINE    - BMI: Estimated body mass index is 33.23 kg/m  as calculated from the following:    Height as of 1/3/23: 1.829 m (6').    Weight as of 1/3/23: 111.1 kg (245 lb).  Obesity (BMI >30)  - No history of Diabetes Mellitus     - continue levothyroxine    HEME  VTE Medium Risk 1.8%            Total Score: 6    VTE: Greater than 59 yrs old    VTE: Male    VTE: Pt history of VTE       - hold aspirin 7 days prior to surgery   - on warfarin for " history of DVT/PE and a-fib  - warfarin plan is pending instructions from the anticoagulation clinic.  See pharmD note.  INR ordered for DOS.    - Chronic anemia.  Last hgb was 13.1.  Repeat CBC ordered.   Recommend perioperative use of blood conservation techniques intraoperatively and close monitoring for postoperative bleeding.  A type and screen has been ordered for this patient    MSK  - left rotator cuff tear arthropathy - surgery planned as above.  - uses a cane PRN.  Consider fall risk precautions.      Psych  - alcohol dependence in remission since 1989        Different anesthesia methods/types have been discussed with the patient, but they are aware that the final plan will be decided by the assigned anesthesia provider on the date of service.      The patient is not yet optimized for their procedure. Echocardiogram pending.    **Addendum (2/2/23): Echocardiogram and labs completed today.  See reports above.  Okay to proceed with surgery as planned.**      Please refer to the physical examination documented by the anesthesiologist in the anesthesia record on the day of surgery.    Video-Visit Details    Type of service:  Video Visit    Provider received verbal consent for a Video Visit from the patient? Yes   Video Start Time: 0902  Video End Time:0926    Originating Location (pt. Location): Home  Distant Location (provider location):  Off-site  Mode of Communication:  Video Conference via Touchbase  On the day of service:     Prep time: 20 minutes  Visit time: 24 minutes  Documentation time: 5 minutes  ------------------------------------------  Total time: 49 minutes      VAMSI Quiñones CNP  Preoperative Assessment Center  [facility]  Clinic and Surgery Center  Phone: [phone]  Fax: [phone]

## 2023-01-24 NOTE — PROGRESS NOTES
Jaun is a 82 year old who is being evaluated via a billable video visit.      How would you like to obtain your AVS? MyChart          Subjective   Jaun is a 82 year old; presenting for the following health issues:  Pre-Op Exam (/)      HPI         Review of Systems       Physical Exam           UNRULY Jacques LPN

## 2023-01-25 ENCOUNTER — TELEPHONE (OUTPATIENT)
Dept: SURGERY | Facility: CLINIC | Age: 83
End: 2023-01-25
Payer: MEDICARE

## 2023-01-25 NOTE — TELEPHONE ENCOUNTER
Wilfred Zamorano of 2 upcoming appointments - [place] [facility] lab on 2/2 at 10:15 am, [facility]   [address]. SEmiliano Diez and an echo 2/2 at 11 am at [facility] [address] expressed understanding.  Shani Murray, RN

## 2023-02-01 ENCOUNTER — TELEPHONE (OUTPATIENT)
Dept: ORTHOPEDICS | Facility: CLINIC | Age: 83
End: 2023-02-01

## 2023-02-01 LAB
ABO/RH(D): NORMAL
ANTIBODY SCREEN: NEGATIVE
SPECIMEN EXPIRATION DATE: NORMAL

## 2023-02-01 NOTE — TELEPHONE ENCOUNTER
KATLYNM for patient to call and reshedule 3/8 appt with Dr. Vasquez for 3/6 at [place]. Contact info provided. GERARD 2/1/23

## 2023-02-02 ENCOUNTER — LAB (OUTPATIENT)
Dept: LAB | Facility: CLINIC | Age: 83
End: 2023-02-02
Payer: MEDICARE

## 2023-02-02 ENCOUNTER — MYC MEDICAL ADVICE (OUTPATIENT)
Dept: ANTICOAGULATION | Facility: CLINIC | Age: 83
End: 2023-02-02

## 2023-02-02 ENCOUNTER — ANTICOAGULATION THERAPY VISIT (OUTPATIENT)
Dept: ANTICOAGULATION | Facility: CLINIC | Age: 83
End: 2023-02-02

## 2023-02-02 ENCOUNTER — TELEPHONE (OUTPATIENT)
Dept: CARDIOLOGY | Facility: CLINIC | Age: 83
End: 2023-02-02

## 2023-02-02 ENCOUNTER — HOSPITAL ENCOUNTER (OUTPATIENT)
Dept: CARDIOLOGY | Facility: CLINIC | Age: 83
Discharge: HOME | End: 2023-02-02
Attending: INTERNAL MEDICINE | Admitting: INTERNAL MEDICINE
Payer: MEDICARE

## 2023-02-02 DIAGNOSIS — I48.0 PAROXYSMAL A-FIB (H): ICD-10-CM

## 2023-02-02 DIAGNOSIS — Z79.01 LONG TERM CURRENT USE OF ANTICOAGULANTS WITH INR GOAL OF 2.0-3.0: ICD-10-CM

## 2023-02-02 DIAGNOSIS — I26.99 PE (PULMONARY THROMBOEMBOLISM) (H): ICD-10-CM

## 2023-02-02 DIAGNOSIS — Z01.818 PREOP EXAMINATION: ICD-10-CM

## 2023-02-02 DIAGNOSIS — I82.4Z9 DEEP VEIN THROMBOSIS (DVT) OF DISTAL VEIN OF LOWER EXTREMITY, UNSPECIFIED CHRONICITY, UNSPECIFIED LATERALITY (H): ICD-10-CM

## 2023-02-02 DIAGNOSIS — I25.10 CORONARY ARTERY DISEASE INVOLVING NATIVE CORONARY ARTERY OF NATIVE HEART WITHOUT ANGINA PECTORIS: ICD-10-CM

## 2023-02-02 DIAGNOSIS — I50.30 HEART FAILURE WITH PRESERVED EJECTION FRACTION, UNSPECIFIED HF CHRONICITY (H): ICD-10-CM

## 2023-02-02 DIAGNOSIS — I26.99 PE (PULMONARY THROMBOEMBOLISM) (H): Primary | ICD-10-CM

## 2023-02-02 LAB
ANION GAP SERPL CALCULATED.3IONS-SCNC: 10 MMOL/L (ref 7–15)
BUN SERPL-MCNC: 36.6 MG/DL (ref 8–23)
CALCIUM SERPL-MCNC: 9.9 MG/DL (ref 8.8–10.2)
CHLORIDE SERPL-SCNC: 98 MMOL/L (ref 98–107)
CREAT SERPL-MCNC: 1.04 MG/DL (ref 0.67–1.17)
DEPRECATED HCO3 PLAS-SCNC: 33 MMOL/L (ref 22–29)
ERYTHROCYTE [DISTWIDTH] IN BLOOD BY AUTOMATED COUNT: 14.7 % (ref 10–15)
GFR SERPL CREATININE-BSD FRML MDRD: 71 ML/MIN/1.73M2
GLUCOSE SERPL-MCNC: 97 MG/DL (ref 70–99)
HCT VFR BLD AUTO: 41.3 % (ref 40–53)
HGB BLD-MCNC: 13 G/DL (ref 13.3–17.7)
INR BLD: 2.2 (ref 0.9–1.1)
LVEF ECHO: NORMAL
MCH RBC QN AUTO: 30 PG (ref 26.5–33)
MCHC RBC AUTO-ENTMCNC: 31.5 G/DL (ref 31.5–36.5)
MCV RBC AUTO: 95 FL (ref 78–100)
PLATELET # BLD AUTO: 173 10E3/UL (ref 150–450)
POTASSIUM SERPL-SCNC: 4 MMOL/L (ref 3.4–5.3)
RBC # BLD AUTO: 4.33 10E6/UL (ref 4.4–5.9)
SODIUM SERPL-SCNC: 141 MMOL/L (ref 136–145)
WBC # BLD AUTO: 9.1 10E3/UL (ref 4–11)

## 2023-02-02 PROCEDURE — 999N000208 ECHOCARDIOGRAM COMPLETE

## 2023-02-02 PROCEDURE — 36415 COLL VENOUS BLD VENIPUNCTURE: CPT

## 2023-02-02 PROCEDURE — 86900 BLOOD TYPING SEROLOGIC ABO: CPT

## 2023-02-02 PROCEDURE — 85027 COMPLETE CBC AUTOMATED: CPT

## 2023-02-02 PROCEDURE — 80048 BASIC METABOLIC PNL TOTAL CA: CPT

## 2023-02-02 PROCEDURE — 255N000002 HC RX 255 OP 636: Performed by: INTERNAL MEDICINE

## 2023-02-02 PROCEDURE — 85610 PROTHROMBIN TIME: CPT

## 2023-02-02 PROCEDURE — 86901 BLOOD TYPING SEROLOGIC RH(D): CPT

## 2023-02-02 PROCEDURE — 86850 RBC ANTIBODY SCREEN: CPT

## 2023-02-02 PROCEDURE — 93306 TTE W/DOPPLER COMPLETE: CPT | Mod: 26 | Performed by: INTERNAL MEDICINE

## 2023-02-02 RX ADMIN — HUMAN ALBUMIN MICROSPHERES AND PERFLUTREN 9 ML: 10; .22 INJECTION, SOLUTION INTRAVENOUS at 11:44

## 2023-02-02 NOTE — TELEPHONE ENCOUNTER
Reviewed echo showing   1. Normal left ventricular size and systolic function. Left ventricular ejection fraction is 60 to 65%.  2. No regional wall motion abnormality.  3. No hemodynamically significant valvular disease.  4. Mild aortic root dilatation. The ascending aorta is Mildly dilated.  No prior study for comparison. There is no comparison study available.    Per office noted dated 11/16/22, Dr. Portillo recommended:   1.  The patient should take all of his cardiac medications prior to his surgery.  2.  This patient does not need any special monitoring prior to his surgery.  3.  The patient does not require bridging anticoagulation, but his warfarin should be restarted as soon as safely possible after his surgery from a bleeding point of view.     We will see the patient back again in about 4-6 months' time.  I will repeat his echocardiogram at that stage.    Will message Dr. Portillo to review. Jessica PRETTY

## 2023-02-02 NOTE — PROGRESS NOTES
PLAN     Unable to reach Jaun today x several attempts - phone has no VM available.    Writer sent [employer] message instructing patient to continue current dose of warfarin 5 mg tonight. Request call back for assessment.    Follow up required to confirm warfarin dose taken and assess for changes    Nicole Gaytan RN  Anticoagulation Clinic  2/2/2023

## 2023-02-02 NOTE — RESULT ENCOUNTER NOTE
Moose Jung,    Your test results are attached. Your labs are stable and okay for surgery.      Your echocardiogram results will come back to you in MyChart from your cardiology provider, but I did review it and it looks okay for surgery.         Carol Santillan DNP, RN, ANP-C

## 2023-02-02 NOTE — PROGRESS NOTES
ANTICOAGULATION MANAGEMENT     Erich Craven 82 year old male is on warfarin with therapeutic INR result. (Goal INR 2.0-3.0)    Recent labs: (last 7 days)     02/02/23  1003   INR 2.2*       ASSESSMENT       Source(s): Chart Review    Previous INR was Subtherapeutic    Medication, diet, health changes since last INR chart reviewed; none identified     Upcoming Surgery: Left total shoulder arthroplasty scheduled for 2/21/23 - tentative 5 day hold cued up - TE sent to [facility] on 12/14/22         PLAN     Unable to reach Jaun today.    No instructions provided. Unable to leave voicemail.    Follow up required to confirm warfarin dose taken and assess for changes    Nicole Gaytan, RN  Anticoagulation Clinic  2/2/2023

## 2023-02-03 NOTE — PROGRESS NOTES
"ANTICOAGULATION MANAGEMENT     Erich Craven 82 year old male is on warfarin with therapeutic INR result. (Goal INR 2.0-3.0)    Recent labs: (last 7 days)     02/02/23  1003   INR 2.2*       ASSESSMENT       Source(s): Chart Review and Patient/Caregiver Call       Warfarin doses taken: Warfarin taken as instructed    Diet: No new diet changes identified    New illness, injury, or hospitalization: No    Medication/supplement changes: None noted    Signs or symptoms of bleeding or clotting: No    Previous INR: Subtherapeutic    Additional findings: Upcoming surgery/procedure 2/21 left total shoulder arthroplasty  Waiting on hold orders  Sent to [facility] 12/14 Reminder sent today       PLAN     Recommended plan for no diet, medication or health factor changes affecting INR     Dosing Instructions: Continue your current warfarin dose with next INR in 2 weeks       Summary  As of 2/2/2023    Full warfarin instructions:  2/16: Hold; 2/17: Hold; 2/18: Hold; 2/19: Hold; 2/20: Hold; Otherwise 7.5 mg every Wed, Sat; 5 mg all other days   Next INR check:  2/28/2023             Telephone call with Jaun who verbalizes understanding and agrees to plan    Lab visit scheduled    Education provided:     Please call back if any changes to your diet, medications or how you've been taking warfarin    Plan made per [facility] anticoagulation protocol    Peggy Ricci, RN  Anticoagulation Clinic  2/3/2023    _______________________________________________________________________     Anticoagulation Episode Summary     Current INR goal:  2.0-3.0   TTR:  49.8 % (1 y)   Target end date:  Indefinite   Send INR reminders to:  SERENITY BECERRA    Indications    PE (pulmonary thromboembolism) (H) [I26.99]  Paroxysmal A-fib (H) [I48.0]  Deep vein thrombosis (DVT) of distal vein of lower extremity  unspecified chronicity  unspecified laterality (H) [I82.4Z9]  Long term current use of anticoagulants with INR goal of 2.0-3.0 [Z79.01]           Comments:   "         Anticoagulation Care Providers     Provider Role Specialty Phone number    Dontrell Gómez MD Referring Internal Medicine [phone]    Anne Heath MD Referring Internal Medicine [phone]

## 2023-02-03 NOTE — TELEPHONE ENCOUNTER
Called pt with recommendations from Dr. Portillo. Pt will come 2/13/23 at 11:15 AM. Will message scheduling to add to Dr. Portillo's schedule. Jessica PRETTY

## 2023-02-03 NOTE — TELEPHONE ENCOUNTER
Patient reviewed warfarin dosing instructions and denied changes/concerns. See 2/2/23 [facility] encounter for full details. NELA BallesterosN, RN

## 2023-02-06 ENCOUNTER — MYC REFILL (OUTPATIENT)
Dept: ANESTHESIOLOGY | Facility: CLINIC | Age: 83
End: 2023-02-06
Payer: MEDICARE

## 2023-02-06 DIAGNOSIS — M25.512 CHRONIC LEFT SHOULDER PAIN: ICD-10-CM

## 2023-02-06 DIAGNOSIS — G89.29 CHRONIC LEFT SHOULDER PAIN: ICD-10-CM

## 2023-02-07 ENCOUNTER — TRANSFERRED RECORDS (OUTPATIENT)
Dept: HEALTH INFORMATION MANAGEMENT | Facility: CLINIC | Age: 83
End: 2023-02-07

## 2023-02-07 NOTE — TELEPHONE ENCOUNTER
Refill request    Medication: traMADol (ULTRAM) 50 MG tablet    Sig: Take 0.5 tablets (25 mg) by mouth 2 times daily as needed for severe pain (7-10)    Dispensed: 30  Refills: 0  SOLD to the pt on: 1/10/23  LPN called and verified  date with pharmacy.       Last clinic appointment: 8/25/22  Next clinic appointment: Not scheduled    Last Drug Screen Collected: 9/20/21  Controlled Substance Agreement signed: 9/20/21      Preferred pharmacy:    [id number] - [place], [place] - [address] AT [address] [place] [address]

## 2023-02-08 RX ORDER — TRAMADOL HYDROCHLORIDE 50 MG/1
25 TABLET ORAL 2 TIMES DAILY PRN
Qty: 30 TABLET | Refills: 0 | Status: ON HOLD | OUTPATIENT
Start: 2023-02-08 | End: 2023-02-22

## 2023-02-13 ENCOUNTER — OFFICE VISIT (OUTPATIENT)
Dept: CARDIOLOGY | Facility: CLINIC | Age: 83
End: 2023-02-13
Payer: MEDICARE

## 2023-02-13 VITALS
HEART RATE: 79 BPM | SYSTOLIC BLOOD PRESSURE: 128 MMHG | DIASTOLIC BLOOD PRESSURE: 66 MMHG | WEIGHT: 249.2 LBS | HEIGHT: 70 IN | OXYGEN SATURATION: 93 % | BODY MASS INDEX: 35.68 KG/M2

## 2023-02-13 DIAGNOSIS — I48.0 PAROXYSMAL ATRIAL FIBRILLATION (H): ICD-10-CM

## 2023-02-13 DIAGNOSIS — G47.33 OSA (OBSTRUCTIVE SLEEP APNEA): ICD-10-CM

## 2023-02-13 DIAGNOSIS — I77.810 AORTIC ROOT DILATATION (H): ICD-10-CM

## 2023-02-13 DIAGNOSIS — I10 ESSENTIAL HYPERTENSION: ICD-10-CM

## 2023-02-13 DIAGNOSIS — I50.30 HEART FAILURE WITH PRESERVED EJECTION FRACTION, UNSPECIFIED HF CHRONICITY (H): ICD-10-CM

## 2023-02-13 DIAGNOSIS — E66.01 MORBID OBESITY (H): ICD-10-CM

## 2023-02-13 DIAGNOSIS — I77.810 ASCENDING AORTA DILATATION (H): ICD-10-CM

## 2023-02-13 DIAGNOSIS — I50.32 CHRONIC DIASTOLIC HEART FAILURE (H): ICD-10-CM

## 2023-02-13 DIAGNOSIS — Z95.0 CARDIAC PACEMAKER IN SITU: ICD-10-CM

## 2023-02-13 DIAGNOSIS — I25.10 CORONARY ARTERY DISEASE INVOLVING NATIVE CORONARY ARTERY OF NATIVE HEART WITHOUT ANGINA PECTORIS: Primary | ICD-10-CM

## 2023-02-13 DIAGNOSIS — R60.0 BILATERAL LOWER EXTREMITY EDEMA: ICD-10-CM

## 2023-02-13 PROCEDURE — 99214 OFFICE O/P EST MOD 30 MIN: CPT | Performed by: INTERNAL MEDICINE

## 2023-02-13 NOTE — LETTER
2/13/2023    Anne Heath MD  [address]  [place] [address]    RE: Erich Craven       Dear Colleague,     I had the pleasure of seeing Erich Craven in the [facility].  HPI and Plan:   See dictation        Orders Placed This Encounter   Procedures     Basic metabolic panel     Lipid Profile     ALT     Follow-Up with Cardiology     Echocardiogram Complete       No orders of the defined types were placed in this encounter.      There are no discontinued medications.      Encounter Diagnoses   Name Primary?     Coronary artery disease involving native coronary artery of native heart without angina pectoris Yes     Paroxysmal atrial fibrillation (H)      Essential hypertension      Heart failure with preserved ejection fraction, unspecified HF chronicity (H)      Cardiac pacemaker in situ      RIN (obstructive sleep apnea)      Morbid obesity (H)      Chronic diastolic heart failure (H)      Ascending aorta dilatation (H)      Aortic root dilatation (H)      Bilateral lower extremity edema        CURRENT MEDICATIONS:  Current Outpatient Medications   Medication Sig Dispense Refill     acetaminophen (TYLENOL) 500 MG tablet Take 1,000 mg by mouth 3 times daily       Acetylcarnitine HCl (ACETYL L-CARNITINE PO) Take 1 capsule by mouth daily       albuterol (PROAIR HFA/PROVENTIL HFA/VENTOLIN HFA) 108 (90 Base) MCG/ACT inhaler Inhale 2 puffs into the lungs 4 times daily as needed for shortness of breath / dyspnea or wheezing       aspirin (ASA) 81 MG chewable tablet Take 1 tablet by mouth daily       atorvastatin (LIPITOR) 40 MG tablet Take 1 tablet (40 mg) by mouth every evening 90 tablet 3     diltiazem ER COATED BEADS (CARDIZEM CD/CARTIA XT) 300 MG 24 hr capsule Take 1 capsule (300 mg) by mouth daily 90 capsule 3     Fluticasone-Umeclidin-Vilanterol (TRELEGY ELLIPTA) 200-62.5-25 MCG/INH oral inhaler Inhale 1 puff into the lungs daily       furosemide (LASIX) 20 MG tablet Take 1  tablet (20 mg) by mouth every evening (Patient taking differently: Take 20 mg by mouth every evening Also takes 40 mg in the morning (see other order)) 30 tablet 0     furosemide (LASIX) 40 MG tablet Take 1 tablet (40 mg) by mouth every morning (Patient taking differently: Take 40 mg by mouth every morning Also takes 20 mg in the evening (see other order)) 90 tablet 1     isosorbide mononitrate (IMDUR) 30 MG 24 hr tablet Take 1 tablet (30 mg) by mouth daily 90 tablet 3     levothyroxine (SYNTHROID/LEVOTHROID) 175 MCG tablet TAKE 1 TABLET(175 MCG) BY MOUTH DAILY 90 tablet 2     lisinopril (ZESTRIL) 20 MG tablet Take 0.5 tablets (10 mg) by mouth daily 45 tablet 3     metolazone (ZAROXOLYN) 2.5 MG tablet TAKE 1 TABLET(2.5 MG) BY MOUTH TWICE DAILY 180 tablet 2     Multiple vitamin TABS Take 1 tablet by mouth daily       polyethylene glycol (MIRALAX) 17 g packet Take 1 packet by mouth daily       potassium chloride ER (KLOR-CON M) 10 MEQ CR tablet TAKE 1 TABLET(10 MEQ) BY MOUTH DAILY 90 tablet 0     spironolactone (ALDACTONE) 25 MG tablet Take 1 tablet (25 mg) by mouth every other day 45 tablet 0     tamsulosin (FLOMAX) 0.4 MG capsule Take 1 capsule (0.4 mg) by mouth daily 30 capsule 0     TAURINE PO Take 1 capsule by mouth daily       traMADol (ULTRAM) 50 MG tablet Take 0.5 tablets (25 mg) by mouth 2 times daily as needed for severe pain (7-10) 30 tablet 0     venlafaxine (EFFEXOR) 75 MG tablet Take 1 tablet (75 mg) by mouth 2 times daily 180 tablet 1     warfarin ANTICOAGULANT (COUMADIN) 5 MG tablet Take 1 tablet (5 mg) by mouth daily Or as directed by the INR clinic. INR DUE for further refills. (Patient taking differently: As of January 23, 2023 - take 7.5 mg every Wed, Sat; 5 mg all other days of the week. Takes in the morning.  Or as directed by the INR clinic. 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 Started in 2012     Hypothyroidism      Lymphedema      Mumps      Obesity, Class III, BMI 40-49.9 (morbid obesity) (H)      RIN (obstructive sleep apnea)     has refused CPAP     Pacemaker      Paroxysmal A-fib (H)      PE (pulmonary thromboembolism) (H)        PAST SURGICAL HISTORY:  Past Surgical History:   Procedure Laterality Date     cardiac stenting       CV RIGHT HEART CATH MEASUREMENTS RECORDED N/A 11/5/2019    Procedure: Right Heart Cath;  Surgeon: Roberto Hernandez MD;  [facility]     ENT SURGERY      tonsillectomy     IMPLANT PACEMAKER       INJECT NERVE BLOCK SUPRASCAPULAR Bilateral 8/26/2021    Procedure: BLOCK, NERVE, SUPRASCAPULAR, Bilateral, under ultrasound guidance;  Surgeon: Swetha Allen MD;  Location: [facility] OR     INJECT NERVE BLOCK SUPRASCAPULAR Bilateral 9/23/2021    Procedure: bilateral suprascapular nerve block;  Surgeon: Swetha Allen MD;  Location: [facility] OR     INJECT NERVE BLOCK SUPRASCAPULAR Left 9/15/2022    Procedure: BLOCK, NERVE, SUPRASCAPULAR- left;  Surgeon: Swetha Allen MD;  Location: [facility] OR     INJECT NERVE BLOCK SUPRASCAPULAR Left 10/20/2022    Procedure: BLOCK, NERVE, SUPRASCAPULAR- left;  Surgeon: Swetha Allen MD;  Location: [facility] OR     INJECT NERVE BLOCK SUPRASCAPULAR Left 1/5/2023     "Procedure: BLOCK, NERVE, SUPRASCAPULAR (left);  Surgeon: Swetha Allen MD;  Location: [facility] OR     NERVE BLOCK PERIPHERAL Bilateral 3/10/2022    Procedure: bilateral suprascapular nerve block;  Surgeon: Swetha Allen MD;  Location: [facility] OR     ORTHOPEDIC SURGERY Right 2012    knee replacement       FAMILY HISTORY:  Family History   Problem Relation Age of Onset     Brain Cancer Mother      Cerebrovascular Disease Father      Hypertension Father      Anesthesia Reaction No family hx of      Thrombosis No family hx of        SOCIAL HISTORY:  Social History     Socioeconomic History     Marital status:      Spouse name: None     Number of children: 5     Years of education: None     Highest education level: None   Occupational History     Occupation: retired teacher at [employer]   Tobacco Use     Smoking status: Former     Packs/day: 0.50     Years: 25.00     Pack years: 12.50     Types: Cigarettes     Start date:      Quit date:      Years since quittin.1     Smokeless tobacco: Never   Vaping Use     Vaping Use: Never used   Substance and Sexual Activity     Alcohol use: No     Comment: quit in ; recovering     Drug use: No     Sexual activity: Not Currently     Partners: Female       Review of Systems:  Skin:          Eyes:         ENT:         Respiratory:          Cardiovascular:         Gastroenterology:        Genitourinary:         Musculoskeletal:         Neurologic:         Psychiatric:         Heme/Lymph/Imm:         Endocrine:           Physical Exam:  Vitals: /66   Pulse 79   Ht 1.778 m (5' 10\")   Wt 113 kg (249 lb 3.2 oz)   SpO2 93%   BMI 35.76 kg/m      Constitutional:  cooperative, alert and oriented, well developed, well nourished, in no acute distress        Skin:  warm and dry to the touch venous stasis changes        Head:  no masses or lesions        Eyes:  pupils equal and round        Lymph:No Cervical lymphadenopathy present     ENT:  no pallor or " cyanosis        Neck:  carotid pulses are full and equal bilaterally, JVP normal, no carotid bruit        Respiratory:  normal breath sounds, clear to auscultation, normal A-P diameter, normal symmetry, normal respiratory excursion, no use of accessory muscles         Cardiac: regular rhythm, normal S1/S2, no S3 or S4, apical impulse not displaced, no murmurs, gallops or rubs   distant heart sounds            pulses full and equal, no bruits auscultated                                        GI:    obese      Extremities and Muscular Skeletal:  no deformities, clubbing, cyanosis, erythema observed stasis pigmentation bilateral LE edema;trace;pitting          Neurological:  no gross motor deficits;affect appropriate        Psych:  Alert and Oriented x 3        CC  No referring provider defined for this encounter.                Service Date: 02/13/2023    HISTORY OF PRESENT ILLNESS:  It is my pleasure to see your patient, Erich Craven in followup.  In fact, I saw this patient at the end of November where we did okay the patient for shoulder surgery.  If you remember, this is a patient who has a history of coronary artery disease and underwent stenting of the proximal left anterior descending artery and to proximal to mid right coronary artery in 2004.  He also has a history of paroxysmal atrial fibrillation, for which is anticoagulated with warfarin.  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  He also in the past his diagnosis of congestive heart failure, but on right heart catheterization, he was found to be euvolemic, but this was on diuretic therapy.  He has a very unusual diuretic program.  He takes furosemide 40 mg in the morning and 20 mg in the evening, but he also takes metolazone 2.5 mg in the morning and 2.5 mg in the evening.    That is a very high dose of metolazone to be taking every day, but interestingly, his renal function is completely normal on  this with a BUN of 36, creatinine 1.04 and a GFR of 71, so it does appear to be working for him.      He has chronic lower extremity edema, essentially lymphedema, which also may be related to chronic obstructive pulmonary disease.      He does have essential hypertension.  His blood pressure is well controlled today at 128/66.      His lipid profile was excellent on 08/02/2022 with an LDL of 63, HDL of 48 and triglycerides of 92 with a total cholesterol of 129.  This patient also had an echocardiogram performed approximately 11 days ago and this shows that his ejection fraction is normal with an EF of 60%-65% with no regional wall motion abnormalities.  He has no hemodynamically significant valvular disease.  He has mild aortic root dilatation and mild ascending aortic dilatation with no change from his echo at [facility] in 2019.  His IVC suggests normal RA pressures.      Interrogation of his pacemaker shows some brief episodes of atrial fibrillation, which are infrequent.    IMPRESSION:    1.  This patient would be regarded as being a low risk for an intraoperative cardiac event during his cardiac surgery.  Given given that he does not have history of decompensated heart failure, he does not have history of unstable angina pectoris, he does not have history of malignant dysrhythmias and he has no significant valvular heart disease.  The patient also appears euvolemic here today.  No special monitoring is necessary, no special further investigations are necessary prior to surgery.  He does not require bridging prior to his surgery as the risk of stroke would be low.  He should take all of his medications on the morning of his procedure.  Warfarin should be held as per surgical protocol for this shoulder procedure.  2.  Paroxysmal atrial fibrillation.  Infrequent episodes of atrial fibrillation.  3.  No significant valvular heart disease.  4.  Normotensive.  5.  Excellent lipid profile.  6.  Coronary artery  disease.  No symptoms of angina pectoris.    PLAN:  As I mentioned above, we will continue the patient on his present medications.  He does not need any specific monitoring during his surgery and he does not need bridging anticoagulation.  Anticoagulation should be started as soon as possible surgery within the risk profile for the surgery with respect to bleeding.      I will see the patient back again in 1 year's time.  We will repeat his echocardiogram at that stage to follow his diastolic dysfunction and also his dilated ascending aorta and aortic root.  We will recheck his basic metabolic profile and recheck his lipid profile at that time.    It has been my pleasure to be involved the care of this nice patient.    Brent Dixon MD    cc:    Anne Heath MD  [facility]  [address]  [place] [address]     Brent Dixon MD, [facility]        D: 2023   T: 2023   MT: KARO    Name:     KHUSHBOO NINO  MRN:      [id number]        Account:      [id number]   :      1940           Service Date: 2023       Document: [id number]      Thank you for allowing me to participate in the care of your patient.      Sincerely,     Brent Portillo MD, [facility]  cc:   No referring provider defined for this encounter.

## 2023-02-13 NOTE — PROGRESS NOTES
HPI and Plan:   See dictation        Orders Placed This Encounter   Procedures     Basic metabolic panel     Lipid Profile     ALT     Follow-Up with Cardiology     Echocardiogram Complete       No orders of the defined types were placed in this encounter.      There are no discontinued medications.      Encounter Diagnoses   Name Primary?     Coronary artery disease involving native coronary artery of native heart without angina pectoris Yes     Paroxysmal atrial fibrillation (H)      Essential hypertension      Heart failure with preserved ejection fraction, unspecified HF chronicity (H)      Cardiac pacemaker in situ      RIN (obstructive sleep apnea)      Morbid obesity (H)      Chronic diastolic heart failure (H)      Ascending aorta dilatation (H)      Aortic root dilatation (H)      Bilateral lower extremity edema        CURRENT MEDICATIONS:  Current Outpatient Medications   Medication Sig Dispense Refill     acetaminophen (TYLENOL) 500 MG tablet Take 1,000 mg by mouth 3 times daily       Acetylcarnitine HCl (ACETYL L-CARNITINE PO) Take 1 capsule by mouth daily       albuterol (PROAIR HFA/PROVENTIL HFA/VENTOLIN HFA) 108 (90 Base) MCG/ACT inhaler Inhale 2 puffs into the lungs 4 times daily as needed for shortness of breath / dyspnea or wheezing       aspirin (ASA) 81 MG chewable tablet Take 1 tablet by mouth daily       atorvastatin (LIPITOR) 40 MG tablet Take 1 tablet (40 mg) by mouth every evening 90 tablet 3     diltiazem ER COATED BEADS (CARDIZEM CD/CARTIA XT) 300 MG 24 hr capsule Take 1 capsule (300 mg) by mouth daily 90 capsule 3     Fluticasone-Umeclidin-Vilanterol (TRELEGY ELLIPTA) 200-62.5-25 MCG/INH oral inhaler Inhale 1 puff into the lungs daily       furosemide (LASIX) 20 MG tablet Take 1 tablet (20 mg) by mouth every evening (Patient taking differently: Take 20 mg by mouth every evening Also takes 40 mg in the morning (see other order)) 30 tablet 0     furosemide (LASIX) 40 MG tablet Take 1 tablet  (40 mg) by mouth every morning (Patient taking differently: Take 40 mg by mouth every morning Also takes 20 mg in the evening (see other order)) 90 tablet 1     isosorbide mononitrate (IMDUR) 30 MG 24 hr tablet Take 1 tablet (30 mg) by mouth daily 90 tablet 3     levothyroxine (SYNTHROID/LEVOTHROID) 175 MCG tablet TAKE 1 TABLET(175 MCG) BY MOUTH DAILY 90 tablet 2     lisinopril (ZESTRIL) 20 MG tablet Take 0.5 tablets (10 mg) by mouth daily 45 tablet 3     metolazone (ZAROXOLYN) 2.5 MG tablet TAKE 1 TABLET(2.5 MG) BY MOUTH TWICE DAILY 180 tablet 2     Multiple vitamin TABS Take 1 tablet by mouth daily       polyethylene glycol (MIRALAX) 17 g packet Take 1 packet by mouth daily       potassium chloride ER (KLOR-CON M) 10 MEQ CR tablet TAKE 1 TABLET(10 MEQ) BY MOUTH DAILY 90 tablet 0     spironolactone (ALDACTONE) 25 MG tablet Take 1 tablet (25 mg) by mouth every other day 45 tablet 0     tamsulosin (FLOMAX) 0.4 MG capsule Take 1 capsule (0.4 mg) by mouth daily 30 capsule 0     TAURINE PO Take 1 capsule by mouth daily       traMADol (ULTRAM) 50 MG tablet Take 0.5 tablets (25 mg) by mouth 2 times daily as needed for severe pain (7-10) 30 tablet 0     venlafaxine (EFFEXOR) 75 MG tablet Take 1 tablet (75 mg) by mouth 2 times daily 180 tablet 1     warfarin ANTICOAGULANT (COUMADIN) 5 MG tablet Take 1 tablet (5 mg) by mouth daily Or as directed by the INR clinic. INR DUE for further refills. (Patient taking differently: As of January 23, 2023 - take 7.5 mg every Wed, Sat; 5 mg all other days of the week. Takes in the morning.  Or as directed by the INR clinic. 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 Started in 2012     Hypothyroidism      Lymphedema      Mumps      Obesity, Class III, BMI 40-49.9 (morbid obesity) (H)      RIN (obstructive sleep apnea)     has refused CPAP     Pacemaker      Paroxysmal A-fib (H)      PE (pulmonary thromboembolism) (H)        PAST SURGICAL HISTORY:  Past Surgical History:   Procedure Laterality Date     cardiac stenting       CV RIGHT HEART CATH MEASUREMENTS RECORDED N/A 11/5/2019    Procedure: Right Heart Cath;  Surgeon: Roberto Hernandez MD;  [facility]     ENT SURGERY      tonsillectomy     IMPLANT PACEMAKER       INJECT NERVE BLOCK SUPRASCAPULAR Bilateral 8/26/2021    Procedure: BLOCK, NERVE, SUPRASCAPULAR, Bilateral, under ultrasound guidance;  Surgeon: Swetha Allen MD;  Location: [facility] OR     INJECT NERVE BLOCK SUPRASCAPULAR Bilateral 9/23/2021    Procedure: bilateral suprascapular nerve block;  Surgeon: Swetha Allen MD;  Location: [facility] OR     INJECT NERVE BLOCK SUPRASCAPULAR Left 9/15/2022    Procedure: BLOCK, NERVE, SUPRASCAPULAR- left;  Surgeon: Swetha Allen MD;  Location: [facility] OR     INJECT NERVE BLOCK SUPRASCAPULAR Left 10/20/2022    Procedure: BLOCK, NERVE, SUPRASCAPULAR- left;  Surgeon: Swetha Allen MD;  Location: [facility] OR     INJECT NERVE BLOCK SUPRASCAPULAR Left 1/5/2023    Procedure: BLOCK, NERVE, SUPRASCAPULAR (left);  Surgeon: Swetha Allen MD;  Location: [facility] OR     NERVE BLOCK PERIPHERAL Bilateral 3/10/2022    Procedure: bilateral suprascapular nerve block;  Surgeon: Swetha Allen MD;   "Location: [facility] OR     ORTHOPEDIC SURGERY Right 2012    knee replacement       FAMILY HISTORY:  Family History   Problem Relation Age of Onset     Brain Cancer Mother      Cerebrovascular Disease Father      Hypertension Father      Anesthesia Reaction No family hx of      Thrombosis No family hx of        SOCIAL HISTORY:  Social History     Socioeconomic History     Marital status:      Spouse name: None     Number of children: 5     Years of education: None     Highest education level: None   Occupational History     Occupation: retired teacher at [employer]   Tobacco Use     Smoking status: Former     Packs/day: 0.50     Years: 25.00     Pack years: 12.50     Types: Cigarettes     Start date:      Quit date:      Years since quittin.1     Smokeless tobacco: Never   Vaping Use     Vaping Use: Never used   Substance and Sexual Activity     Alcohol use: No     Comment: quit in ; recovering     Drug use: No     Sexual activity: Not Currently     Partners: Female       Review of Systems:  Skin:          Eyes:         ENT:         Respiratory:          Cardiovascular:         Gastroenterology:        Genitourinary:         Musculoskeletal:         Neurologic:         Psychiatric:         Heme/Lymph/Imm:         Endocrine:           Physical Exam:  Vitals: /66   Pulse 79   Ht 1.778 m (5' 10\")   Wt 113 kg (249 lb 3.2 oz)   SpO2 93%   BMI 35.76 kg/m      Constitutional:  cooperative, alert and oriented, well developed, well nourished, in no acute distress        Skin:  warm and dry to the touch venous stasis changes        Head:  no masses or lesions        Eyes:  pupils equal and round        Lymph:No Cervical lymphadenopathy present     ENT:  no pallor or cyanosis        Neck:  carotid pulses are full and equal bilaterally, JVP normal, no carotid bruit        Respiratory:  normal breath sounds, clear to auscultation, normal A-P diameter, normal symmetry, normal respiratory " excursion, no use of accessory muscles         Cardiac: regular rhythm, normal S1/S2, no S3 or S4, apical impulse not displaced, no murmurs, gallops or rubs   distant heart sounds            pulses full and equal, no bruits auscultated                                        GI:    obese      Extremities and Muscular Skeletal:  no deformities, clubbing, cyanosis, erythema observed stasis pigmentation bilateral LE edema;trace;pitting          Neurological:  no gross motor deficits;affect appropriate        Psych:  Alert and Oriented x 3        CC  No referring provider defined for this encounter.

## 2023-02-13 NOTE — PROGRESS NOTES
Service Date: 02/13/2023    HISTORY OF PRESENT ILLNESS:  It is my pleasure to see your patient, Erich Craven in followup.  In fact, I saw this patient at the end of November where we did okay the patient for shoulder surgery.  If you remember, this is a patient who has a history of coronary artery disease and underwent stenting of the proximal left anterior descending artery and to proximal to mid right coronary artery in 2004.  He also has a history of paroxysmal atrial fibrillation, for which is anticoagulated with warfarin.  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  He also in the past his diagnosis of congestive heart failure, but on right heart catheterization, he was found to be euvolemic, but this was on diuretic therapy.  He has a very unusual diuretic program.  He takes furosemide 40 mg in the morning and 20 mg in the evening, but he also takes metolazone 2.5 mg in the morning and 2.5 mg in the evening.    That is a very high dose of metolazone to be taking every day, but interestingly, his renal function is completely normal on this with a BUN of 36, creatinine 1.04 and a GFR of 71, so it does appear to be working for him.      He has chronic lower extremity edema, essentially lymphedema, which also may be related to chronic obstructive pulmonary disease.      He does have essential hypertension.  His blood pressure is well controlled today at 128/66.      His lipid profile was excellent on 08/02/2022 with an LDL of 63, HDL of 48 and triglycerides of 92 with a total cholesterol of 129.  This patient also had an echocardiogram performed approximately 11 days ago and this shows that his ejection fraction is normal with an EF of 60%-65% with no regional wall motion abnormalities.  He has no hemodynamically significant valvular disease.  He has mild aortic root dilatation and mild ascending aortic dilatation with no change from his echo at [facility] in  2019.  His IVC suggests normal RA pressures.      Interrogation of his pacemaker shows some brief episodes of atrial fibrillation, which are infrequent.    IMPRESSION:    1.  This patient would be regarded as being a low risk for an intraoperative cardiac event during his cardiac surgery.  Given given that he does not have history of decompensated heart failure, he does not have history of unstable angina pectoris, he does not have history of malignant dysrhythmias and he has no significant valvular heart disease.  The patient also appears euvolemic here today.  No special monitoring is necessary, no special further investigations are necessary prior to surgery.  He does not require bridging prior to his surgery as the risk of stroke would be low.  He should take all of his medications on the morning of his procedure.  Warfarin should be held as per surgical protocol for this shoulder procedure.  2.  Paroxysmal atrial fibrillation.  Infrequent episodes of atrial fibrillation.  3.  No significant valvular heart disease.  4.  Normotensive.  5.  Excellent lipid profile.  6.  Coronary artery disease.  No symptoms of angina pectoris.    PLAN:  As I mentioned above, we will continue the patient on his present medications.  He does not need any specific monitoring during his surgery and he does not need bridging anticoagulation.  Anticoagulation should be started as soon as possible surgery within the risk profile for the surgery with respect to bleeding.      I will see the patient back again in 1 year's time.  We will repeat his echocardiogram at that stage to follow his diastolic dysfunction and also his dilated ascending aorta and aortic root.  We will recheck his basic metabolic profile and recheck his lipid profile at that time.    It has been my pleasure to be involved the care of this nice patient.    Brent Dixon MD    cc:    Anne Heath MD  [facility]  [address]  [place] [address]      Brent Dixon MD, [facility]        D: 2023   T: 2023   MT: KARO    Name:     KHUSHBOO NINO  MRN:      [id number]        Account:      [id number]   :      1940           Service Date: 2023       Document: [id number]

## 2023-02-14 ENCOUNTER — ANTICOAGULATION THERAPY VISIT (OUTPATIENT)
Dept: ANTICOAGULATION | Facility: CLINIC | Age: 83
End: 2023-02-14

## 2023-02-14 ENCOUNTER — LAB (OUTPATIENT)
Dept: LAB | Facility: CLINIC | Age: 83
End: 2023-02-14
Payer: MEDICARE

## 2023-02-14 DIAGNOSIS — Z79.01 LONG TERM CURRENT USE OF ANTICOAGULANTS WITH INR GOAL OF 2.0-3.0: ICD-10-CM

## 2023-02-14 DIAGNOSIS — I48.0 PAROXYSMAL A-FIB (H): ICD-10-CM

## 2023-02-14 DIAGNOSIS — I82.4Z9 DEEP VEIN THROMBOSIS (DVT) OF DISTAL VEIN OF LOWER EXTREMITY, UNSPECIFIED CHRONICITY, UNSPECIFIED LATERALITY (H): ICD-10-CM

## 2023-02-14 DIAGNOSIS — I26.99 PE (PULMONARY THROMBOEMBOLISM) (H): Primary | ICD-10-CM

## 2023-02-14 DIAGNOSIS — I26.99 PE (PULMONARY THROMBOEMBOLISM) (H): ICD-10-CM

## 2023-02-14 LAB — INR BLD: 1.3 (ref 0.9–1.1)

## 2023-02-14 PROCEDURE — 36416 COLLJ CAPILLARY BLOOD SPEC: CPT

## 2023-02-14 PROCEDURE — 85610 PROTHROMBIN TIME: CPT

## 2023-02-14 NOTE — PROGRESS NOTES
ANTICOAGULATION MANAGEMENT     Erich Craven 82 year old male is on warfarin with subtherapeutic INR result. (Goal INR 2.0-3.0)    Recent labs: (last 7 days)     02/14/23  0928   INR 1.3*       ASSESSMENT       Source(s): Chart Review and Patient/Caregiver Call       Warfarin doses taken: 5 day hold was advised by [facility], Jaun reports he was instructed by ortho to hold for 10 days prior to procedure    Diet: No new diet changes identified    New illness, injury, or hospitalization: No    Medication/supplement changes: None noted    Signs or symptoms of bleeding or clotting: No    Previous INR: Therapeutic last visit; previously outside of goal range    Additional findings: Upcoming surgery/procedure - Left total shoulder arthroplasty on 2/21/23. Unsure if he will go to TCU afterwads, and if so, unsure of length of stay. INR tentatively scheduled for 1 week after procedure       PLAN     Recommended plan for temporary change(s) affecting INR     Dosing Instructions: Continue holding warfarin until evening after procedure on 2/21, then restart maintenance dose with next INR in 2 weeks       Summary  As of 2/14/2023    Full warfarin instructions:  2/14: Hold; 2/15: Hold; 2/16: Hold; 2/17: Hold; 2/18: Hold; 2/19: Hold; 2/20: Hold; Otherwise 7.5 mg every Wed, Sat; 5 mg all other days   Next INR check:  2/28/2023             Telephone call with Jaun who verbalizes understanding and agrees to plan    Lab visit scheduled    Education provided:     Please call back if any changes to your diet, medications or how you've been taking warfarin    Importance of notifying anticoagulation clinic for: longer hold requested by procedure team than [facility] hold advised    Plan made with [facility] Pharmacist Rohini Santillan RN  Anticoagulation Clinic  2/14/2023    _______________________________________________________________________     Anticoagulation Episode Summary     Current INR goal:  2.0-3.0   TTR:  47.8 % (1 y)    Target end date:  Indefinite   Send INR reminders to:  SERENITY BECERRA    Indications    PE (pulmonary thromboembolism) (H) [I26.99]  Paroxysmal A-fib (H) [I48.0]  Deep vein thrombosis (DVT) of distal vein of lower extremity  unspecified chronicity  unspecified laterality (H) [I82.4Z9]  Long term current use of anticoagulants with INR goal of 2.0-3.0 [Z79.01]           Comments:           Anticoagulation Care Providers     Provider Role Specialty Phone number    Dontrell Gómez MD Referring Internal Medicine [phone]    Anne Heath MD Referring Internal Medicine [phone]

## 2023-02-15 NOTE — TELEPHONE ENCOUNTER
Patient advised [facility] on 2/14 that Ortho instructed a 10-day warfarin hold. INR on 2/14 = 1.3 - hold in progress. Patient made aware by [facility] RN of the risk associated with the prolonged hold.     Rohini Anne, ElliD, BCPS

## 2023-02-20 ENCOUNTER — ANESTHESIA EVENT (OUTPATIENT)
Dept: SURGERY | Facility: CLINIC | Age: 83
End: 2023-02-20
Payer: MEDICARE

## 2023-02-20 ASSESSMENT — LIFESTYLE VARIABLES: TOBACCO_USE: 1

## 2023-02-20 ASSESSMENT — ENCOUNTER SYMPTOMS: ORTHOPNEA: 0

## 2023-02-20 ASSESSMENT — COPD QUESTIONNAIRES
CAT_SEVERITY: MODERATE
COPD: 1

## 2023-02-20 NOTE — ANESTHESIA PREPROCEDURE EVALUATION
Anesthesia Pre-Procedure Evaluation    Patient: Erich Craven   MRN: [id number] : 1940        Procedure : Procedure(s):  ARTHROPLASTY, SHOULDER, TOTAL, REVERSE LEFT          Past Medical History:   Diagnosis Date     (HFpEF) heart failure with preserved ejection fraction (H)      Alcohol dependence in remission (H)     in recovery since      Arthritis      BPH with urinary obstruction      CAD (coronary artery disease)     PCI w/ [facility] in      Cardiac pacemaker in situ      Chronic diastolic heart failure (H)      Complication of anesthesia     post-op DVT  after knee surgery     COPD (chronic obstructive pulmonary disease) (H)      Diabetes (H)      DVT (deep venous thrombosis) (H)      Dyspnea      Essential hypertension      Fatigue      HLD (hyperlipidemia)      Hx of long term use of blood thinners     Due to PE. Started in      Hypothyroidism      Lymphedema      Mumps      Obesity, Class III, BMI 40-49.9 (morbid obesity) (H)      RIN (obstructive sleep apnea)     has refused CPAP     Pacemaker      Paroxysmal A-fib (H)      PE (pulmonary thromboembolism) (H)       Past Surgical History:   Procedure Laterality Date     cardiac stenting       CV RIGHT HEART CATH MEASUREMENTS RECORDED N/A 2019    Procedure: Right Heart Cath;  Surgeon: Roberto Hernandez MD;  Location:  HEART CARDIAC CATH LAB     ENT SURGERY      tonsillectomy     IMPLANT PACEMAKER       INJECT NERVE BLOCK SUPRASCAPULAR Bilateral 2021    Procedure: BLOCK, NERVE, SUPRASCAPULAR, Bilateral, under ultrasound guidance;  Surgeon: Swetha Allen MD;  Location: [facility] OR     INJECT NERVE BLOCK SUPRASCAPULAR Bilateral 2021    Procedure: bilateral suprascapular nerve block;  Surgeon: Swetha Allen MD;  Location: [facility] OR     INJECT NERVE BLOCK SUPRASCAPULAR Left 9/15/2022    Procedure: BLOCK, NERVE, SUPRASCAPULAR- left;  Surgeon: Swetha Allen MD;  Location: [facility] OR     INJECT NERVE BLOCK SUPRASCAPULAR Left  10/20/2022    Procedure: BLOCK, NERVE, SUPRASCAPULAR- left;  Surgeon: Swetha Allen MD;  Location: [facility] OR     INJECT NERVE BLOCK SUPRASCAPULAR Left 2023    Procedure: BLOCK, NERVE, SUPRASCAPULAR (left);  Surgeon: Swetha Allen MD;  Location: [facility] OR     NERVE BLOCK PERIPHERAL Bilateral 3/10/2022    Procedure: bilateral suprascapular nerve block;  Surgeon: Swetha Allen MD;  Location: [facility]     ORTHOPEDIC SURGERY Right 2012    knee replacement      Allergies   Allergen Reactions     No Known Allergies       Social History     Tobacco Use     Smoking status: Former     Packs/day: 0.50     Years: 25.00     Pack years: 12.50     Types: Cigarettes     Start date:      Quit date:      Years since quittin.1     Smokeless tobacco: Never   Substance Use Topics     Alcohol use: No     Comment: quit in ; recovering      Wt Readings from Last 1 Encounters:   23 113 kg (249 lb 3.2 oz)        Anesthesia Evaluation   Pt has had prior anesthetic.     History of anesthetic complications   post-op DVT .    ROS/MED HX  ENT/Pulmonary:     (+) sleep apnea (uses bi-pap), tobacco use, Past use, moderate,  COPD,  (-) recent URI   Neurologic:  - neg neurologic ROS     Cardiovascular: Comment: Venous stasis- no current edema, no wounds    (+) hypertension--CAD --stent-. 2 Taking blood thinners CHF etiology: Ischemic Last EF: 60-65 pacemaker, Previous cardiac testing   Echo: Date: Results:  Interpretation Summary     1. Normal left ventricular size and systolic function. Left ventricular  ejection fraction is 60 to 65%.  2. No regional wall motion abnormality.  3. No hemodynamically significant valvular disease.  4. Mild aortic root dilatation. The ascending aorta is Mildly dilated.  No prior study for comparison. There is no comparison study available.  ______________________________________________________________________________  Stress Test: Date: Results:  See H&P  ECG Reviewed: Date:  Results:    Cath: Date: Results:   (-) MARTÍNEZ and orthopnea/PND   METS/Exercise Tolerance: 4 - Raking leaves, gardening    Hematologic:     (+) History of blood clots, pt is anticoagulated, anemia,  (-) history of blood transfusion   Musculoskeletal: Comment: Left rotator cuff tear arthropathy, chronic left shoulder pain, left shoulder OA - physical therapy not helpful, injections help only short periods of time      S/p right shoulder replacement last year - good ROM    Uses a cane for walks outdoors with his dog just in case the dog pulls.  No recent falls  (+) arthritis,     GI/Hepatic:  - neg GI/hepatic ROS     Renal/Genitourinary: Comment: Urinary frequency    (+) BPH,     Endo: Comment: prediabetes    (+) thyroid problem, hypothyroidism, Obesity,     Psychiatric/Substance Use:     (+) alcohol abuse     Infectious Disease:  - neg infectious disease ROS     Malignancy:   (+) Malignancy, History of Skin.Skin CA Remission status post Surgery.        Other:  - neg other ROS    (+) , H/O Chronic Pain,           OUTSIDE LABS:  CBC:   Lab Results   Component Value Date    WBC 9.1 02/02/2023    WBC 10.2 12/14/2022    HGB 13.0 (L) 02/02/2023    HGB 13.1 (L) 01/02/2023    HCT 41.3 02/02/2023    HCT 40.0 12/14/2022     02/02/2023     12/14/2022     BMP:   Lab Results   Component Value Date     02/02/2023     01/02/2023    POTASSIUM 4.0 02/02/2023    POTASSIUM 4.1 01/02/2023    CHLORIDE 98 02/02/2023    CHLORIDE 95 (L) 01/02/2023    CO2 33 (H) 02/02/2023    CO2 28 01/02/2023    BUN 36.6 (H) 02/02/2023    BUN 25.5 (H) 01/02/2023    CR 1.04 02/02/2023    CR 0.96 01/02/2023    GLC 97 02/02/2023    GLC 81 01/02/2023     COAGS:   Lab Results   Component Value Date    PTT 34 11/05/2019    INR 1.3 (H) 02/14/2023     POC: No results found for: BGM, HCG, HCGS  HEPATIC:   Lab Results   Component Value Date    ALBUMIN 4.3 01/02/2023    PROTTOTAL 7.2 08/02/2022    ALT 17 08/02/2022    AST 13 08/02/2022     ALKPHOS 100 08/02/2022    BILITOTAL 0.4 08/02/2022     OTHER:   Lab Results   Component Value Date    PH 7.39 07/15/2022    LACT 0.6 07/15/2022    A1C 5.8 (H) 12/14/2022    LAWSON 9.9 02/02/2023    PHOS 2.7 01/02/2023    MAG 2.2 09/17/2022    LIPASE 61 (L) 07/14/2022    TSH 1.58 12/14/2022    CRP 35.0 (H) 03/29/2022       Anesthesia Plan    ASA Status:  3   NPO Status:  NPO Appropriate    Anesthesia Type: General.     - Airway: LMA   Induction: Intravenous, Propofol.   Maintenance: Balanced.   Techniques and Equipment:     - Lines/Monitors: BIS (Clearsite)     - Drips/Meds: Phenylephrine     Consents    Anesthesia Plan(s) and associated risks, benefits, and realistic alternatives discussed. Questions answered and patient/representative(s) expressed understanding.    - Discussed:     - Discussed with:  Patient    Use of blood products discussed: No .     Postoperative Care    Pain management: Multi-modal analgesia, Oral pain medications, Peripheral nerve block (Single Shot).   PONV prophylaxis: Ondansetron (or other 5HT-3), Dexamethasone or Solumedrol     Comments:                Josafat Wilkins DO

## 2023-02-21 ENCOUNTER — ANESTHESIA (OUTPATIENT)
Dept: SURGERY | Facility: CLINIC | Age: 83
End: 2023-02-21
Payer: MEDICARE

## 2023-02-21 ENCOUNTER — APPOINTMENT (OUTPATIENT)
Dept: GENERAL RADIOLOGY | Facility: CLINIC | Age: 83
End: 2023-02-21
Attending: ORTHOPAEDIC SURGERY
Payer: MEDICARE

## 2023-02-21 ENCOUNTER — HOSPITAL ENCOUNTER (OUTPATIENT)
Facility: CLINIC | Age: 83
LOS: 1 days | Discharge: NURSING, REHAB OR LONG-TERM CARE | End: 2023-02-24
Attending: ORTHOPAEDIC SURGERY | Admitting: ORTHOPAEDIC SURGERY
Payer: MEDICARE

## 2023-02-21 ENCOUNTER — ANCILLARY PROCEDURE (OUTPATIENT)
Dept: CARDIOLOGY | Facility: CLINIC | Age: 83
End: 2023-02-21
Attending: INTERNAL MEDICINE
Payer: MEDICARE

## 2023-02-21 DIAGNOSIS — F41.9 ANXIETY: ICD-10-CM

## 2023-02-21 DIAGNOSIS — S99.911A ANKLE INJURY, RIGHT, INITIAL ENCOUNTER: ICD-10-CM

## 2023-02-21 DIAGNOSIS — I49.5 SICK SINUS SYNDROME (H): ICD-10-CM

## 2023-02-21 DIAGNOSIS — I10 PRIMARY HYPERTENSION: ICD-10-CM

## 2023-02-21 DIAGNOSIS — E03.9 HYPOTHYROIDISM, UNSPECIFIED TYPE: ICD-10-CM

## 2023-02-21 DIAGNOSIS — I50.32 CHRONIC DIASTOLIC HEART FAILURE (H): ICD-10-CM

## 2023-02-21 DIAGNOSIS — I48.91 ATRIAL FIBRILLATION (H): ICD-10-CM

## 2023-02-21 DIAGNOSIS — I89.0 LYMPHEDEMA: ICD-10-CM

## 2023-02-21 DIAGNOSIS — I10 ESSENTIAL HYPERTENSION: ICD-10-CM

## 2023-02-21 DIAGNOSIS — G89.29 CHRONIC RIGHT SHOULDER PAIN: ICD-10-CM

## 2023-02-21 DIAGNOSIS — G89.29 CHRONIC LEFT SHOULDER PAIN: Primary | ICD-10-CM

## 2023-02-21 DIAGNOSIS — R53.83 FATIGUE, UNSPECIFIED TYPE: ICD-10-CM

## 2023-02-21 DIAGNOSIS — D32.0 BENIGN NEOPLASM OF CEREBRAL MENINGES (H): ICD-10-CM

## 2023-02-21 DIAGNOSIS — J42 CHRONIC BRONCHITIS, UNSPECIFIED CHRONIC BRONCHITIS TYPE (H): ICD-10-CM

## 2023-02-21 DIAGNOSIS — E11.9 TYPE 2 DIABETES MELLITUS WITHOUT COMPLICATION, UNSPECIFIED WHETHER LONG TERM INSULIN USE (H): ICD-10-CM

## 2023-02-21 DIAGNOSIS — M19.011 PRIMARY OSTEOARTHRITIS OF RIGHT SHOULDER: ICD-10-CM

## 2023-02-21 DIAGNOSIS — G44.209 MUSCLE TENSION HEADACHE: ICD-10-CM

## 2023-02-21 DIAGNOSIS — I50.30 HEART FAILURE WITH PRESERVED EJECTION FRACTION, UNSPECIFIED HF CHRONICITY (H): ICD-10-CM

## 2023-02-21 DIAGNOSIS — Z98.890 STATUS POST CORONARY ANGIOGRAM: ICD-10-CM

## 2023-02-21 DIAGNOSIS — E66.01 OBESITY, CLASS III, BMI 40-49.9 (MORBID OBESITY) (H): ICD-10-CM

## 2023-02-21 DIAGNOSIS — Z95.0 CARDIAC PACEMAKER IN SITU: ICD-10-CM

## 2023-02-21 DIAGNOSIS — M25.512 CHRONIC LEFT SHOULDER PAIN: Primary | ICD-10-CM

## 2023-02-21 DIAGNOSIS — I25.119 CORONARY ARTERY DISEASE INVOLVING NATIVE CORONARY ARTERY WITH ANGINA PECTORIS, UNSPECIFIED WHETHER NATIVE OR TRANSPLANTED HEART (H): ICD-10-CM

## 2023-02-21 DIAGNOSIS — Z79.01 LONG TERM CURRENT USE OF ANTICOAGULANTS WITH INR GOAL OF 2.0-3.0: ICD-10-CM

## 2023-02-21 DIAGNOSIS — N18.31 STAGE 3A CHRONIC KIDNEY DISEASE (H): ICD-10-CM

## 2023-02-21 DIAGNOSIS — E78.49 OTHER HYPERLIPIDEMIA: ICD-10-CM

## 2023-02-21 DIAGNOSIS — E66.01 MORBID OBESITY (H): ICD-10-CM

## 2023-02-21 DIAGNOSIS — K56.41 FECAL IMPACTION (H): ICD-10-CM

## 2023-02-21 DIAGNOSIS — R06.02 SHORTNESS OF BREATH: ICD-10-CM

## 2023-02-21 DIAGNOSIS — G47.33 OSA (OBSTRUCTIVE SLEEP APNEA): ICD-10-CM

## 2023-02-21 DIAGNOSIS — M25.511 CHRONIC RIGHT SHOULDER PAIN: ICD-10-CM

## 2023-02-21 DIAGNOSIS — I26.99 PE (PULMONARY THROMBOEMBOLISM) (H): ICD-10-CM

## 2023-02-21 DIAGNOSIS — I48.0 PAROXYSMAL A-FIB (H): ICD-10-CM

## 2023-02-21 DIAGNOSIS — I82.4Z9 DEEP VEIN THROMBOSIS (DVT) OF DISTAL VEIN OF LOWER EXTREMITY, UNSPECIFIED CHRONICITY, UNSPECIFIED LATERALITY (H): ICD-10-CM

## 2023-02-21 LAB
GLUCOSE BLDC GLUCOMTR-MCNC: 131 MG/DL (ref 70–99)
GLUCOSE BLDC GLUCOMTR-MCNC: 97 MG/DL (ref 70–99)
HOLD SPECIMEN: NORMAL
HOLD SPECIMEN: NORMAL
INR PPP: 1.01 (ref 0.85–1.15)
LACTATE SERPL-SCNC: 0.6 MMOL/L (ref 0.7–2)

## 2023-02-21 PROCEDURE — 258N000003 HC RX IP 258 OP 636: Performed by: NURSE ANESTHETIST, CERTIFIED REGISTERED

## 2023-02-21 PROCEDURE — 250N000013 HC RX MED GY IP 250 OP 250 PS 637: Performed by: INTERNAL MEDICINE

## 2023-02-21 PROCEDURE — 370N000017 HC ANESTHESIA TECHNICAL FEE, PER MIN: Performed by: ORTHOPAEDIC SURGERY

## 2023-02-21 PROCEDURE — 83605 ASSAY OF LACTIC ACID: CPT | Performed by: INTERNAL MEDICINE

## 2023-02-21 PROCEDURE — 360N000077 HC SURGERY LEVEL 4, PER MIN: Performed by: ORTHOPAEDIC SURGERY

## 2023-02-21 PROCEDURE — 93296 REM INTERROG EVL PM/IDS: CPT | Performed by: INTERNAL MEDICINE

## 2023-02-21 PROCEDURE — 272N000001 HC OR GENERAL SUPPLY STERILE: Performed by: ORTHOPAEDIC SURGERY

## 2023-02-21 PROCEDURE — 250N000011 HC RX IP 250 OP 636

## 2023-02-21 PROCEDURE — 82962 GLUCOSE BLOOD TEST: CPT

## 2023-02-21 PROCEDURE — 85610 PROTHROMBIN TIME: CPT | Performed by: NURSE PRACTITIONER

## 2023-02-21 PROCEDURE — 93294 REM INTERROG EVL PM/LDLS PM: CPT | Performed by: INTERNAL MEDICINE

## 2023-02-21 PROCEDURE — 250N000011 HC RX IP 250 OP 636: Performed by: ANESTHESIOLOGY

## 2023-02-21 PROCEDURE — C1713 ANCHOR/SCREW BN/BN,TIS/BN: HCPCS | Performed by: ORTHOPAEDIC SURGERY

## 2023-02-21 PROCEDURE — 250N000025 HC SEVOFLURANE, PER MIN: Performed by: ORTHOPAEDIC SURGERY

## 2023-02-21 PROCEDURE — 250N000011 HC RX IP 250 OP 636: Performed by: ORTHOPAEDIC SURGERY

## 2023-02-21 PROCEDURE — 36415 COLL VENOUS BLD VENIPUNCTURE: CPT | Performed by: INTERNAL MEDICINE

## 2023-02-21 PROCEDURE — 250N000013 HC RX MED GY IP 250 OP 250 PS 637: Performed by: ORTHOPAEDIC SURGERY

## 2023-02-21 PROCEDURE — 99222 1ST HOSP IP/OBS MODERATE 55: CPT | Performed by: INTERNAL MEDICINE

## 2023-02-21 PROCEDURE — 272N000002 HC OR SUPPLY OTHER OPNP: Performed by: ORTHOPAEDIC SURGERY

## 2023-02-21 PROCEDURE — 250N000009 HC RX 250: Performed by: ORTHOPAEDIC SURGERY

## 2023-02-21 PROCEDURE — 36415 COLL VENOUS BLD VENIPUNCTURE: CPT | Performed by: NURSE PRACTITIONER

## 2023-02-21 PROCEDURE — 250N000009 HC RX 250

## 2023-02-21 PROCEDURE — C1776 JOINT DEVICE (IMPLANTABLE): HCPCS | Performed by: ORTHOPAEDIC SURGERY

## 2023-02-21 PROCEDURE — 250N000009 HC RX 250: Performed by: NURSE ANESTHETIST, CERTIFIED REGISTERED

## 2023-02-21 PROCEDURE — 999N000141 HC STATISTIC PRE-PROCEDURE NURSING ASSESSMENT: Performed by: ORTHOPAEDIC SURGERY

## 2023-02-21 PROCEDURE — 999N000065 XR SHOULDER LEFT PORT G/E 2 VIEWS: Mod: LT

## 2023-02-21 PROCEDURE — 271N000001 HC OR GENERAL SUPPLY NON-STERILE: Performed by: ORTHOPAEDIC SURGERY

## 2023-02-21 PROCEDURE — 710N000010 HC RECOVERY PHASE 1, LEVEL 2, PER MIN: Performed by: ORTHOPAEDIC SURGERY

## 2023-02-21 PROCEDURE — 23472 RECONSTRUCT SHOULDER JOINT: CPT | Mod: LT | Performed by: ORTHOPAEDIC SURGERY

## 2023-02-21 PROCEDURE — C9290 INJ, BUPIVACAINE LIPOSOME: HCPCS | Performed by: ANESTHESIOLOGY

## 2023-02-21 PROCEDURE — 258N000003 HC RX IP 258 OP 636: Performed by: ORTHOPAEDIC SURGERY

## 2023-02-21 PROCEDURE — 250N000011 HC RX IP 250 OP 636: Performed by: NURSE ANESTHETIST, CERTIFIED REGISTERED

## 2023-02-21 DEVICE — IMPLANTABLE DEVICE: Type: IMPLANTABLE DEVICE | Site: SHOULDER | Status: FUNCTIONAL

## 2023-02-21 DEVICE — IMPLANTABLE DEVICE
Type: IMPLANTABLE DEVICE | Site: SHOULDER | Status: FUNCTIONAL
Brand: COMPREHENSIVE® REVERSE SHOULDER

## 2023-02-21 DEVICE — IMP HUMERAL TRAY ZIM RVRS SHLDR STD 110031399: Type: IMPLANTABLE DEVICE | Site: SHOULDER | Status: FUNCTIONAL

## 2023-02-21 RX ORDER — SODIUM CHLORIDE, SODIUM LACTATE, POTASSIUM CHLORIDE, CALCIUM CHLORIDE 600; 310; 30; 20 MG/100ML; MG/100ML; MG/100ML; MG/100ML
INJECTION, SOLUTION INTRAVENOUS CONTINUOUS
Status: DISCONTINUED | OUTPATIENT
Start: 2023-02-21 | End: 2023-02-21 | Stop reason: HOSPADM

## 2023-02-21 RX ORDER — ONDANSETRON 2 MG/ML
4 INJECTION INTRAMUSCULAR; INTRAVENOUS EVERY 6 HOURS PRN
Status: DISCONTINUED | OUTPATIENT
Start: 2023-02-21 | End: 2023-02-24 | Stop reason: HOSPADM

## 2023-02-21 RX ORDER — SPIRONOLACTONE 25 MG
25 TABLET ORAL EVERY OTHER DAY
Status: DISCONTINUED | OUTPATIENT
Start: 2023-02-22 | End: 2023-02-22

## 2023-02-21 RX ORDER — FLUTICASONE FUROATE AND VILANTEROL 200; 25 UG/1; UG/1
1 POWDER RESPIRATORY (INHALATION) DAILY
Status: DISCONTINUED | OUTPATIENT
Start: 2023-02-22 | End: 2023-02-24 | Stop reason: HOSPADM

## 2023-02-21 RX ORDER — NALOXONE HYDROCHLORIDE 0.4 MG/ML
0.4 INJECTION, SOLUTION INTRAMUSCULAR; INTRAVENOUS; SUBCUTANEOUS
Status: DISCONTINUED | OUTPATIENT
Start: 2023-02-21 | End: 2023-02-24 | Stop reason: HOSPADM

## 2023-02-21 RX ORDER — ACETAMINOPHEN 325 MG/1
650 TABLET ORAL EVERY 4 HOURS PRN
Status: DISCONTINUED | OUTPATIENT
Start: 2023-02-24 | End: 2023-02-24 | Stop reason: HOSPADM

## 2023-02-21 RX ORDER — ONDANSETRON 2 MG/ML
INJECTION INTRAMUSCULAR; INTRAVENOUS PRN
Status: DISCONTINUED | OUTPATIENT
Start: 2023-02-21 | End: 2023-02-21

## 2023-02-21 RX ORDER — NALOXONE HYDROCHLORIDE 0.4 MG/ML
0.2 INJECTION, SOLUTION INTRAMUSCULAR; INTRAVENOUS; SUBCUTANEOUS
Status: DISCONTINUED | OUTPATIENT
Start: 2023-02-21 | End: 2023-02-21 | Stop reason: HOSPADM

## 2023-02-21 RX ORDER — HYDROMORPHONE HCL IN WATER/PF 6 MG/30 ML
0.2 PATIENT CONTROLLED ANALGESIA SYRINGE INTRAVENOUS
Status: DISCONTINUED | OUTPATIENT
Start: 2023-02-21 | End: 2023-02-24 | Stop reason: HOSPADM

## 2023-02-21 RX ORDER — NALOXONE HYDROCHLORIDE 0.4 MG/ML
0.4 INJECTION, SOLUTION INTRAMUSCULAR; INTRAVENOUS; SUBCUTANEOUS
Status: DISCONTINUED | OUTPATIENT
Start: 2023-02-21 | End: 2023-02-21 | Stop reason: HOSPADM

## 2023-02-21 RX ORDER — BISACODYL 10 MG
10 SUPPOSITORY, RECTAL RECTAL DAILY PRN
Status: DISCONTINUED | OUTPATIENT
Start: 2023-02-21 | End: 2023-02-24 | Stop reason: HOSPADM

## 2023-02-21 RX ORDER — FENTANYL CITRATE 50 UG/ML
INJECTION, SOLUTION INTRAMUSCULAR; INTRAVENOUS PRN
Status: DISCONTINUED | OUTPATIENT
Start: 2023-02-21 | End: 2023-02-21

## 2023-02-21 RX ORDER — CEFAZOLIN SODIUM 2 G/100ML
2 INJECTION, SOLUTION INTRAVENOUS EVERY 8 HOURS
Status: COMPLETED | OUTPATIENT
Start: 2023-02-21 | End: 2023-02-22

## 2023-02-21 RX ORDER — CEFAZOLIN SODIUM/WATER 2 G/20 ML
2 SYRINGE (ML) INTRAVENOUS SEE ADMIN INSTRUCTIONS
Status: DISCONTINUED | OUTPATIENT
Start: 2023-02-21 | End: 2023-02-21 | Stop reason: HOSPADM

## 2023-02-21 RX ORDER — BUPIVACAINE HYDROCHLORIDE 2.5 MG/ML
INJECTION, SOLUTION EPIDURAL; INFILTRATION; INTRACAUDAL
Status: DISCONTINUED | OUTPATIENT
Start: 2023-02-21 | End: 2023-02-21

## 2023-02-21 RX ORDER — ALBUTEROL SULFATE 90 UG/1
2 AEROSOL, METERED RESPIRATORY (INHALATION) 4 TIMES DAILY PRN
Status: DISCONTINUED | OUTPATIENT
Start: 2023-02-21 | End: 2023-02-24 | Stop reason: HOSPADM

## 2023-02-21 RX ORDER — WARFARIN SODIUM 7.5 MG/1
7.5 TABLET ORAL
Status: COMPLETED | OUTPATIENT
Start: 2023-02-21 | End: 2023-02-21

## 2023-02-21 RX ORDER — SODIUM CHLORIDE, SODIUM LACTATE, POTASSIUM CHLORIDE, CALCIUM CHLORIDE 600; 310; 30; 20 MG/100ML; MG/100ML; MG/100ML; MG/100ML
INJECTION, SOLUTION INTRAVENOUS CONTINUOUS PRN
Status: DISCONTINUED | OUTPATIENT
Start: 2023-02-21 | End: 2023-02-21

## 2023-02-21 RX ORDER — MULTIVITAMIN,THERAPEUTIC
1 TABLET ORAL DAILY
Status: DISCONTINUED | OUTPATIENT
Start: 2023-02-22 | End: 2023-02-24 | Stop reason: HOSPADM

## 2023-02-21 RX ORDER — PROCHLORPERAZINE MALEATE 5 MG
5 TABLET ORAL EVERY 6 HOURS PRN
Status: DISCONTINUED | OUTPATIENT
Start: 2023-02-21 | End: 2023-02-24 | Stop reason: HOSPADM

## 2023-02-21 RX ORDER — ACETAMINOPHEN 325 MG/1
975 TABLET ORAL EVERY 8 HOURS
Status: COMPLETED | OUTPATIENT
Start: 2023-02-21 | End: 2023-02-24

## 2023-02-21 RX ORDER — ISOSORBIDE MONONITRATE 30 MG/1
30 TABLET, EXTENDED RELEASE ORAL DAILY
Status: DISCONTINUED | OUTPATIENT
Start: 2023-02-22 | End: 2023-02-24 | Stop reason: HOSPADM

## 2023-02-21 RX ORDER — POLYETHYLENE GLYCOL 3350 17 G/17G
17 POWDER, FOR SOLUTION ORAL DAILY
Status: DISCONTINUED | OUTPATIENT
Start: 2023-02-22 | End: 2023-02-24 | Stop reason: HOSPADM

## 2023-02-21 RX ORDER — ATORVASTATIN CALCIUM 40 MG/1
40 TABLET, FILM COATED ORAL EVERY EVENING
Status: DISCONTINUED | OUTPATIENT
Start: 2023-02-21 | End: 2023-02-24 | Stop reason: HOSPADM

## 2023-02-21 RX ORDER — FENTANYL CITRATE 50 UG/ML
25-50 INJECTION, SOLUTION INTRAMUSCULAR; INTRAVENOUS
Status: DISCONTINUED | OUTPATIENT
Start: 2023-02-21 | End: 2023-02-21 | Stop reason: HOSPADM

## 2023-02-21 RX ORDER — NALOXONE HYDROCHLORIDE 0.4 MG/ML
0.2 INJECTION, SOLUTION INTRAMUSCULAR; INTRAVENOUS; SUBCUTANEOUS
Status: DISCONTINUED | OUTPATIENT
Start: 2023-02-21 | End: 2023-02-24 | Stop reason: HOSPADM

## 2023-02-21 RX ORDER — PROPOFOL 10 MG/ML
INJECTION, EMULSION INTRAVENOUS PRN
Status: DISCONTINUED | OUTPATIENT
Start: 2023-02-21 | End: 2023-02-21

## 2023-02-21 RX ORDER — DEXAMETHASONE SODIUM PHOSPHATE 4 MG/ML
INJECTION, SOLUTION INTRA-ARTICULAR; INTRALESIONAL; INTRAMUSCULAR; INTRAVENOUS; SOFT TISSUE PRN
Status: DISCONTINUED | OUTPATIENT
Start: 2023-02-21 | End: 2023-02-21

## 2023-02-21 RX ORDER — OXYCODONE HYDROCHLORIDE 10 MG/1
10 TABLET ORAL EVERY 4 HOURS PRN
Status: DISCONTINUED | OUTPATIENT
Start: 2023-02-21 | End: 2023-02-24 | Stop reason: HOSPADM

## 2023-02-21 RX ORDER — SODIUM CHLORIDE, SODIUM LACTATE, POTASSIUM CHLORIDE, CALCIUM CHLORIDE 600; 310; 30; 20 MG/100ML; MG/100ML; MG/100ML; MG/100ML
INJECTION, SOLUTION INTRAVENOUS
Status: COMPLETED
Start: 2023-02-21 | End: 2023-02-21

## 2023-02-21 RX ORDER — ONDANSETRON 4 MG/1
4 TABLET, ORALLY DISINTEGRATING ORAL EVERY 30 MIN PRN
Status: DISCONTINUED | OUTPATIENT
Start: 2023-02-21 | End: 2023-02-21 | Stop reason: HOSPADM

## 2023-02-21 RX ORDER — VENLAFAXINE 75 MG/1
75 TABLET ORAL 2 TIMES DAILY
Status: DISCONTINUED | OUTPATIENT
Start: 2023-02-21 | End: 2023-02-24 | Stop reason: HOSPADM

## 2023-02-21 RX ORDER — FENTANYL CITRATE 50 UG/ML
50 INJECTION, SOLUTION INTRAMUSCULAR; INTRAVENOUS EVERY 5 MIN PRN
Status: DISCONTINUED | OUTPATIENT
Start: 2023-02-21 | End: 2023-02-21 | Stop reason: HOSPADM

## 2023-02-21 RX ORDER — TAURINE 500 MG
CAPSULE ORAL DAILY
Status: DISCONTINUED | OUTPATIENT
Start: 2023-02-21 | End: 2023-02-21

## 2023-02-21 RX ORDER — IBUPROFEN 600 MG/1
600 TABLET, FILM COATED ORAL EVERY 6 HOURS PRN
Status: DISCONTINUED | OUTPATIENT
Start: 2023-02-21 | End: 2023-02-21

## 2023-02-21 RX ORDER — LIDOCAINE 40 MG/G
CREAM TOPICAL
Status: DISCONTINUED | OUTPATIENT
Start: 2023-02-21 | End: 2023-02-24 | Stop reason: HOSPADM

## 2023-02-21 RX ORDER — VANCOMYCIN HYDROCHLORIDE 1 G/20ML
INJECTION, POWDER, LYOPHILIZED, FOR SOLUTION INTRAVENOUS PRN
Status: DISCONTINUED | OUTPATIENT
Start: 2023-02-21 | End: 2023-02-21 | Stop reason: HOSPADM

## 2023-02-21 RX ORDER — CEFAZOLIN SODIUM/WATER 2 G/20 ML
2 SYRINGE (ML) INTRAVENOUS
Status: COMPLETED | OUTPATIENT
Start: 2023-02-21 | End: 2023-02-21

## 2023-02-21 RX ORDER — LIDOCAINE 40 MG/G
CREAM TOPICAL
Status: DISCONTINUED | OUTPATIENT
Start: 2023-02-21 | End: 2023-02-21 | Stop reason: HOSPADM

## 2023-02-21 RX ORDER — HYDROMORPHONE HCL IN WATER/PF 6 MG/30 ML
0.4 PATIENT CONTROLLED ANALGESIA SYRINGE INTRAVENOUS
Status: DISCONTINUED | OUTPATIENT
Start: 2023-02-21 | End: 2023-02-24 | Stop reason: HOSPADM

## 2023-02-21 RX ORDER — HYDROMORPHONE HYDROCHLORIDE 1 MG/ML
0.2 INJECTION, SOLUTION INTRAMUSCULAR; INTRAVENOUS; SUBCUTANEOUS EVERY 5 MIN PRN
Status: DISCONTINUED | OUTPATIENT
Start: 2023-02-21 | End: 2023-02-21 | Stop reason: HOSPADM

## 2023-02-21 RX ORDER — ASPIRIN 81 MG/1
81 TABLET, CHEWABLE ORAL DAILY
Status: DISCONTINUED | OUTPATIENT
Start: 2023-02-22 | End: 2023-02-24 | Stop reason: HOSPADM

## 2023-02-21 RX ORDER — LISINOPRIL 5 MG/1
10 TABLET ORAL DAILY
Status: DISCONTINUED | OUTPATIENT
Start: 2023-02-22 | End: 2023-02-24 | Stop reason: HOSPADM

## 2023-02-21 RX ORDER — METOLAZONE 2.5 MG/1
2.5 TABLET ORAL 2 TIMES DAILY
Status: DISCONTINUED | OUTPATIENT
Start: 2023-02-21 | End: 2023-02-22

## 2023-02-21 RX ORDER — TRANEXAMIC ACID 650 MG/1
1950 TABLET ORAL ONCE
Status: COMPLETED | OUTPATIENT
Start: 2023-02-21 | End: 2023-02-21

## 2023-02-21 RX ORDER — HYDROMORPHONE HYDROCHLORIDE 1 MG/ML
0.4 INJECTION, SOLUTION INTRAMUSCULAR; INTRAVENOUS; SUBCUTANEOUS EVERY 5 MIN PRN
Status: DISCONTINUED | OUTPATIENT
Start: 2023-02-21 | End: 2023-02-21 | Stop reason: HOSPADM

## 2023-02-21 RX ORDER — ONDANSETRON 2 MG/ML
4 INJECTION INTRAMUSCULAR; INTRAVENOUS EVERY 30 MIN PRN
Status: DISCONTINUED | OUTPATIENT
Start: 2023-02-21 | End: 2023-02-21 | Stop reason: HOSPADM

## 2023-02-21 RX ORDER — ACETAMINOPHEN 325 MG/1
975 TABLET ORAL ONCE
Status: COMPLETED | OUTPATIENT
Start: 2023-02-21 | End: 2023-02-21

## 2023-02-21 RX ORDER — FENTANYL CITRATE 50 UG/ML
25 INJECTION, SOLUTION INTRAMUSCULAR; INTRAVENOUS EVERY 5 MIN PRN
Status: DISCONTINUED | OUTPATIENT
Start: 2023-02-21 | End: 2023-02-21 | Stop reason: HOSPADM

## 2023-02-21 RX ORDER — TAMSULOSIN HYDROCHLORIDE 0.4 MG/1
0.4 CAPSULE ORAL DAILY
Status: DISCONTINUED | OUTPATIENT
Start: 2023-02-22 | End: 2023-02-24 | Stop reason: HOSPADM

## 2023-02-21 RX ORDER — AMOXICILLIN 250 MG
1 CAPSULE ORAL 2 TIMES DAILY
Status: DISCONTINUED | OUTPATIENT
Start: 2023-02-21 | End: 2023-02-24 | Stop reason: HOSPADM

## 2023-02-21 RX ORDER — SODIUM CHLORIDE, SODIUM LACTATE, POTASSIUM CHLORIDE, CALCIUM CHLORIDE 600; 310; 30; 20 MG/100ML; MG/100ML; MG/100ML; MG/100ML
INJECTION, SOLUTION INTRAVENOUS CONTINUOUS
Status: DISCONTINUED | OUTPATIENT
Start: 2023-02-21 | End: 2023-02-22

## 2023-02-21 RX ORDER — LIDOCAINE HYDROCHLORIDE 20 MG/ML
INJECTION, SOLUTION INFILTRATION; PERINEURAL PRN
Status: DISCONTINUED | OUTPATIENT
Start: 2023-02-21 | End: 2023-02-21

## 2023-02-21 RX ORDER — OXYCODONE HYDROCHLORIDE 5 MG/1
5 TABLET ORAL EVERY 4 HOURS PRN
Status: DISCONTINUED | OUTPATIENT
Start: 2023-02-21 | End: 2023-02-24 | Stop reason: HOSPADM

## 2023-02-21 RX ORDER — ALCOHOL 70.47 ML/100ML
1 GEL TOPICAL DAILY
Status: DISCONTINUED | OUTPATIENT
Start: 2023-02-21 | End: 2023-02-21

## 2023-02-21 RX ORDER — FLUMAZENIL 0.1 MG/ML
0.2 INJECTION, SOLUTION INTRAVENOUS
Status: DISCONTINUED | OUTPATIENT
Start: 2023-02-21 | End: 2023-02-21 | Stop reason: HOSPADM

## 2023-02-21 RX ORDER — DILTIAZEM HYDROCHLORIDE 300 MG/1
300 CAPSULE, COATED, EXTENDED RELEASE ORAL DAILY
Status: DISCONTINUED | OUTPATIENT
Start: 2023-02-22 | End: 2023-02-24 | Stop reason: HOSPADM

## 2023-02-21 RX ORDER — MULTIVITAMIN
1 TABLET ORAL DAILY
Status: DISCONTINUED | OUTPATIENT
Start: 2023-02-21 | End: 2023-02-21

## 2023-02-21 RX ORDER — ONDANSETRON 4 MG/1
4 TABLET, ORALLY DISINTEGRATING ORAL EVERY 6 HOURS PRN
Status: DISCONTINUED | OUTPATIENT
Start: 2023-02-21 | End: 2023-02-24 | Stop reason: HOSPADM

## 2023-02-21 RX ADMIN — FENTANYL CITRATE 25 MCG: 50 INJECTION, SOLUTION INTRAMUSCULAR; INTRAVENOUS at 10:41

## 2023-02-21 RX ADMIN — SENNOSIDES AND DOCUSATE SODIUM 1 TABLET: 50; 8.6 TABLET ORAL at 19:39

## 2023-02-21 RX ADMIN — SUGAMMADEX 300 MG: 100 INJECTION, SOLUTION INTRAVENOUS at 10:26

## 2023-02-21 RX ADMIN — PHENYLEPHRINE HYDROCHLORIDE 0.3 MCG/KG/MIN: 10 INJECTION INTRAVENOUS at 08:25

## 2023-02-21 RX ADMIN — ALBUTEROL SULFATE 2 PUFF: 90 AEROSOL, METERED RESPIRATORY (INHALATION) at 17:44

## 2023-02-21 RX ADMIN — FENTANYL CITRATE 50 MCG: 50 INJECTION, SOLUTION INTRAMUSCULAR; INTRAVENOUS at 07:39

## 2023-02-21 RX ADMIN — SODIUM CHLORIDE, POTASSIUM CHLORIDE, SODIUM LACTATE AND CALCIUM CHLORIDE: 600; 310; 30; 20 INJECTION, SOLUTION INTRAVENOUS at 13:31

## 2023-02-21 RX ADMIN — HYDROMORPHONE HYDROCHLORIDE 0.2 MG: 0.2 INJECTION, SOLUTION INTRAMUSCULAR; INTRAVENOUS; SUBCUTANEOUS at 16:16

## 2023-02-21 RX ADMIN — BUPIVACAINE HYDROCHLORIDE 10 ML: 2.5 INJECTION, SOLUTION EPIDURAL; INFILTRATION; INTRACAUDAL at 07:36

## 2023-02-21 RX ADMIN — FENTANYL CITRATE 50 MCG: 50 INJECTION, SOLUTION INTRAMUSCULAR; INTRAVENOUS at 10:57

## 2023-02-21 RX ADMIN — PHENYLEPHRINE HYDROCHLORIDE 50 MCG: 10 INJECTION INTRAVENOUS at 09:04

## 2023-02-21 RX ADMIN — FENTANYL CITRATE 50 MCG: 50 INJECTION, SOLUTION INTRAMUSCULAR; INTRAVENOUS at 09:17

## 2023-02-21 RX ADMIN — PHENYLEPHRINE HYDROCHLORIDE 100 MCG: 10 INJECTION INTRAVENOUS at 09:31

## 2023-02-21 RX ADMIN — ONDANSETRON 4 MG: 2 INJECTION INTRAMUSCULAR; INTRAVENOUS at 10:08

## 2023-02-21 RX ADMIN — OXYCODONE HYDROCHLORIDE 5 MG: 5 TABLET ORAL at 21:56

## 2023-02-21 RX ADMIN — TRANEXAMIC ACID 1950 MG: 650 TABLET ORAL at 06:57

## 2023-02-21 RX ADMIN — ATORVASTATIN CALCIUM 40 MG: 40 TABLET, FILM COATED ORAL at 19:39

## 2023-02-21 RX ADMIN — VENLAFAXINE 75 MG: 75 TABLET ORAL at 19:39

## 2023-02-21 RX ADMIN — Medication 40 MG: at 08:15

## 2023-02-21 RX ADMIN — ACETAMINOPHEN 975 MG: 325 TABLET ORAL at 18:47

## 2023-02-21 RX ADMIN — CEFAZOLIN SODIUM 2 G: 2 INJECTION, SOLUTION INTRAVENOUS at 16:49

## 2023-02-21 RX ADMIN — DEXAMETHASONE SODIUM PHOSPHATE 8 MG: 4 INJECTION, SOLUTION INTRA-ARTICULAR; INTRALESIONAL; INTRAMUSCULAR; INTRAVENOUS; SOFT TISSUE at 08:37

## 2023-02-21 RX ADMIN — BUPIVACAINE 10 ML: 13.3 INJECTION, SUSPENSION, LIPOSOMAL INFILTRATION at 07:36

## 2023-02-21 RX ADMIN — LIDOCAINE HYDROCHLORIDE 80 MG: 20 INJECTION, SOLUTION INFILTRATION; PERINEURAL at 08:15

## 2023-02-21 RX ADMIN — Medication 2 G: at 08:24

## 2023-02-21 RX ADMIN — SODIUM CHLORIDE, POTASSIUM CHLORIDE, SODIUM LACTATE AND CALCIUM CHLORIDE: 600; 310; 30; 20 INJECTION, SOLUTION INTRAVENOUS at 13:29

## 2023-02-21 RX ADMIN — WARFARIN SODIUM 7.5 MG: 7.5 TABLET ORAL at 17:51

## 2023-02-21 RX ADMIN — PROPOFOL 160 MG: 10 INJECTION, EMULSION INTRAVENOUS at 08:15

## 2023-02-21 RX ADMIN — HYDROMORPHONE HYDROCHLORIDE 0.2 MG: 1 INJECTION, SOLUTION INTRAMUSCULAR; INTRAVENOUS; SUBCUTANEOUS at 11:23

## 2023-02-21 RX ADMIN — FENTANYL CITRATE 25 MCG: 50 INJECTION, SOLUTION INTRAMUSCULAR; INTRAVENOUS at 10:37

## 2023-02-21 RX ADMIN — OXYCODONE HYDROCHLORIDE 5 MG: 5 TABLET ORAL at 11:35

## 2023-02-21 RX ADMIN — ACETAMINOPHEN 975 MG: 325 TABLET ORAL at 11:35

## 2023-02-21 RX ADMIN — FENTANYL CITRATE 50 MCG: 50 INJECTION, SOLUTION INTRAMUSCULAR; INTRAVENOUS at 08:49

## 2023-02-21 RX ADMIN — FENTANYL CITRATE 50 MCG: 50 INJECTION, SOLUTION INTRAMUSCULAR; INTRAVENOUS at 11:07

## 2023-02-21 RX ADMIN — SODIUM CHLORIDE, POTASSIUM CHLORIDE, SODIUM LACTATE AND CALCIUM CHLORIDE: 600; 310; 30; 20 INJECTION, SOLUTION INTRAVENOUS at 08:02

## 2023-02-21 RX ADMIN — PROPOFOL 40 MG: 10 INJECTION, EMULSION INTRAVENOUS at 10:22

## 2023-02-21 RX ADMIN — HYDROMORPHONE HYDROCHLORIDE 0.2 MG: 1 INJECTION, SOLUTION INTRAMUSCULAR; INTRAVENOUS; SUBCUTANEOUS at 11:39

## 2023-02-21 RX ADMIN — OXYCODONE HYDROCHLORIDE 5 MG: 5 TABLET ORAL at 17:49

## 2023-02-21 RX ADMIN — FENTANYL CITRATE 50 MCG: 50 INJECTION, SOLUTION INTRAMUSCULAR; INTRAVENOUS at 08:21

## 2023-02-21 RX ADMIN — HYDROMORPHONE HYDROCHLORIDE 0.4 MG: 0.2 INJECTION, SOLUTION INTRAMUSCULAR; INTRAVENOUS; SUBCUTANEOUS at 13:25

## 2023-02-21 RX ADMIN — ALBUTEROL SULFATE 2 PUFF: 90 AEROSOL, METERED RESPIRATORY (INHALATION) at 22:35

## 2023-02-21 ASSESSMENT — ACTIVITIES OF DAILY LIVING (ADL)
ADLS_ACUITY_SCORE: 29
ADLS_ACUITY_SCORE: 24
ADLS_ACUITY_SCORE: 29
ADLS_ACUITY_SCORE: 28
ADLS_ACUITY_SCORE: 29
ADLS_ACUITY_SCORE: 24
ADLS_ACUITY_SCORE: 24
ADLS_ACUITY_SCORE: 28
ADLS_ACUITY_SCORE: 22

## 2023-02-21 NOTE — ANESTHESIA CARE TRANSFER NOTE
Patient: Erich Craven    Procedure: Procedure(s):  ARTHROPLASTY, SHOULDER, TOTAL, REVERSE LEFT       Diagnosis: Left rotator cuff tear arthropathy [M75.102, M12.812]  Diagnosis Additional Information: No value filed.    Anesthesia Type:   General     Note:    Oropharynx: oropharynx clear of all foreign objects and spontaneously breathing  Level of Consciousness: awake  Oxygen Supplementation: face mask  Level of Supplemental Oxygen (L/min / FiO2): 8  Independent Airway: airway patency satisfactory and stable  Dentition: dentition unchanged  Vital Signs Stable: post-procedure vital signs reviewed and stable  Report to RN Given: handoff report given  Patient transferred to: PACU    Handoff Report: Identifed the Patient, Identified the Reponsible Provider, Reviewed the pertinent medical history, Discussed the surgical course, Reviewed Intra-OP anesthesia mangement and issues during anesthesia, Set expectations for post-procedure period and Allowed opportunity for questions and acknowledgement of understanding      Vitals:  Vitals Value Taken Time   /73 02/21/23 1042   Temp 36.6    Pulse 89 02/21/23 1044   Resp 12 02/21/23 1044   SpO2 96 % 02/21/23 1044   Vitals shown include unvalidated device data.    Electronically Signed By: RUSSELL GUADARRAMA APRN CRNA  February 21, 2023  10:46 AM

## 2023-02-21 NOTE — ANESTHESIA POSTPROCEDURE EVALUATION
Patient: Erich Craven    Procedure: Procedure(s):  ARTHROPLASTY, SHOULDER, TOTAL, REVERSE LEFT       Anesthesia Type:  General    Note:  Disposition: Admission   Postop Pain Control: Uneventful            Sign Out: Well controlled pain   PONV: No   Neuro/Psych: Uneventful            Sign Out: Acceptable/Baseline neuro status   Airway/Respiratory: Uneventful            Sign Out: Acceptable/Baseline resp. status   CV/Hemodynamics: Uneventful            Sign Out: Acceptable CV status; No obvious hypovolemia; No obvious fluid overload   Other NRE: NONE   DID A NON-ROUTINE EVENT OCCUR? No           Last vitals:  Vitals Value Taken Time   /74 02/21/23 1230   Temp 36.6  C (97.9  F) 02/21/23 1200   Pulse 85 02/21/23 1241   Resp 11 02/21/23 1241   SpO2 93 % 02/21/23 1241   Vitals shown include unvalidated device data.    Electronically Signed By: Josafat Wilkins DO  February 21, 2023  12:42 PM

## 2023-02-21 NOTE — ANESTHESIA PROCEDURE NOTES
Brachial plexus Procedure Note    Pre-Procedure   Staff -        Anesthesiologist:  Malik Denise MD       Resident/Fellow: Wilfredo Espinoza MD       Performed By: resident       Location: pre-op       Procedure Start/Stop Times: 2/21/2023 7:36 AM and 2/21/2023 7:45 AM       Pre-Anesthestic Checklist: patient identified, IV checked, site marked, risks and benefits discussed, informed consent, monitors and equipment checked, pre-op evaluation, at physician/surgeon's request and post-op pain management  Timeout:       Correct Patient: Yes        Correct Procedure: Yes        Correct Site: Yes        Correct Position: Yes        Correct Laterality: Yes        Site Marked: Yes  Procedure Documentation  Procedure: Brachial plexus       Diagnosis: POST OPERATIVE PAIN       Laterality: left       Patient Position: sitting       Patient Prep/Sterile Barriers: sterile gloves, mask       Skin prep: Chloraprep (interscalene approach).       Needle Type: short bevel       Needle Gauge: 21.        Needle Length (millimeters): 110        Ultrasound guided       1. Ultrasound was used to identify targeted nerve, plexus, vascular marker, or fascial plane and place a needle adjacent to it in real-time.       2. Ultrasound was used to visualize the spread of anesthetic in close proximity to the above referenced structure.       3. A permanent image is entered into the patient's record.    Assessment/Narrative         The placement was negative for: blood aspirated, painful injection and site bleeding       Paresthesias: No.       Bolus given via needle..        Secured via.        Insertion/Infusion Method: Single Shot       Complications: none       Injection made incrementally with aspirations every 5 mL.    Medication(s) Administered   Bupivacaine 0.25% PF (Infiltration) - Infiltration   10 mL - 2/21/2023 7:36:00 AM  Bupivacaine liposome (Exparel) 1.3% LA inj susp (Infiltration) - Infiltration   10 mL - 2/21/2023 7:36:00  "AM  Medication Administration Time: 2/21/2023 7:36 AM      FOR [facility] ONLY:   Pain Team Contact information: please page the Pain Team Via [employer]. Search \"Pain\". During daytime hours, please page the attending first. At night please page the resident first.    "

## 2023-02-21 NOTE — PHARMACY-ADMISSION MEDICATION HISTORY
Admission Medication History Completed by Pharmacy    See [facility] Admission Navigator for allergy information, preferred outpatient pharmacy, prior to admission medications and immunization status.     Medication History Sources:     Patient interview    Changes made to PTA medication list (reason):    Added: None    Deleted: None    Changed: None    Additional Information:    Patient was a good historian and had list of home medications with him.     Prior to Admission medications    Medication Sig Last Dose Taking? Auth Provider Long Term End Date   acetaminophen (TYLENOL) 500 MG tablet Take 1,000 mg by mouth 3 times daily 2/21/2023 at 0330 Yes Reported, Patient     Acetylcarnitine HCl (ACETYL L-CARNITINE PO) Take 1 capsule by mouth daily 2/20/2023 Yes Reported, Patient     albuterol (PROAIR HFA/PROVENTIL HFA/VENTOLIN HFA) 108 (90 Base) MCG/ACT inhaler Inhale 2 puffs into the lungs 4 times daily as needed for shortness of breath / dyspnea or wheezing More than a month Yes Reported, Patient No    aspirin (ASA) 81 MG chewable tablet Take 1 tablet by mouth daily 2/21/2023 at 0430 Yes Reported, Patient     atorvastatin (LIPITOR) 40 MG tablet Take 1 tablet (40 mg) by mouth every evening 2/21/2023 at 0400 Yes Anne Heath MD Yes    diltiazem ER COATED BEADS (CARDIZEM CD/CARTIA XT) 300 MG 24 hr capsule Take 1 capsule (300 mg) by mouth daily 2/20/2023 at 2000 Yes Brent Portillo MD Yes    Fluticasone-Umeclidin-Vilanterol (TRELEGY ELLIPTA) 200-62.5-25 MCG/INH oral inhaler Inhale 1 puff into the lungs daily 2/21/2023 at 0400 Yes Reported, Patient     furosemide (LASIX) 20 MG tablet Take 1 tablet (20 mg) by mouth every evening 2/20/2023 Yes Erich Meade NP Yes    furosemide (LASIX) 40 MG tablet Take 1 tablet (40 mg) by mouth every morning 2/20/2023 Yes Dasha Garcia PA-C Yes    isosorbide mononitrate (IMDUR) 30 MG 24 hr tablet Take 1 tablet (30 mg) by mouth daily 2/20/2023 at 1900 Yes  Brent Portillo MD Yes    levothyroxine (SYNTHROID/LEVOTHROID) 175 MCG tablet TAKE 1 TABLET(175 MCG) BY MOUTH DAILY 2/21/2023 at 0400 Yes Anne Heath MD Yes    lisinopril (ZESTRIL) 20 MG tablet Take 0.5 tablets (10 mg) by mouth daily 2/20/2023 at 2000 Yes Tiffany Suero NP Yes    metolazone (ZAROXOLYN) 2.5 MG tablet TAKE 1 TABLET(2.5 MG) BY MOUTH TWICE DAILY 2/20/2023 Yes Anne Heath MD Yes    Multiple vitamin TABS Take 1 tablet by mouth daily 2/20/2023 Yes Reported, Patient     polyethylene glycol (MIRALAX) 17 g packet Take 1 packet by mouth daily 2/20/2023 Yes Reported, Patient     potassium chloride ER (KLOR-CON M) 10 MEQ CR tablet TAKE 1 TABLET(10 MEQ) BY MOUTH DAILY 2/21/2023 at 0400 Yes Anne Heath MD     spironolactone (ALDACTONE) 25 MG tablet Take 1 tablet (25 mg) by mouth every other day 2/20/2023 Yes Anne Heath MD Yes    tamsulosin (FLOMAX) 0.4 MG capsule Take 1 capsule (0.4 mg) by mouth daily 2/20/2023 Yes Erich Meade NP     TAURINE PO Take 1 capsule by mouth daily 2/20/2023 Yes Reported, Patient     traMADol (ULTRAM) 50 MG tablet Take 0.5 tablets (25 mg) by mouth 2 times daily as needed for severe pain (7-10) 2/20/2023 at 2000 Yes Swetha Allen MD     venlafaxine (EFFEXOR) 75 MG tablet Take 1 tablet (75 mg) by mouth 2 times daily 2/21/2023 at 0400 Yes Anne Heath MD Yes    warfarin ANTICOAGULANT (COUMADIN) 5 MG tablet Take 1 tablet (5 mg) by mouth daily Or as directed by the INR clinic. INR DUE for further refills.  Patient taking differently: As of January 23, 2023 - take 7.5 mg every Wed, Sat; 5 mg all other days of the week. Takes in the morning.  Or as directed by the INR clinic. INR DUE for further refills. Past Month Yes Anne Heath MD         Date completed: 02/21/23    Medication history completed by: WILVER MONTANA RPH

## 2023-02-21 NOTE — OR NURSING
Pt reporting took 81 mg ASA this AM. Pt oxygen saturations in pre-op 88% on RA- after encouraging deep breaths- oxygen saturation 92%. Pt has 3-4+ pitting edema in bilateral LE. Pt has pacemaker- was interrogated 11/16. Dr. Wilkins notified and aware per MDA, no further action needed.

## 2023-02-21 NOTE — PHARMACY-ANTICOAGULATION SERVICE
Clinical Pharmacy - Warfarin Dosing Consult     Pharmacy has been consulted to manage this patient s warfarin therapy.  Indication: DVT/PE Prophylaxis, A-fib  Therapy Goal: INR 2-3  OP Anticoag Clinic: [facility]  Warfarin Prior to Admission: Yes  Warfarin PTA Regimen: 7.5 mg every Wed, Sat; 5 mg all other days  Significant drug interactions: PTA aspirin  Recent documented change in oral intake/nutrition: Unknown  Dose Comments: 7.5 mg today (1/2 a boost dose)    INR   Date Value Ref Range Status   02/21/2023 1.01 0.85 - 1.15 Final     Comment:     Some International Normalized Ratio (INR) results performed at the [facility] for patients 6 months and older reported between 07/11/2021 and 5/17/2022 were evaluated against an outdated reference interval of 0.86-1.14 rather than the intended reference interval of 0.85-1.15. The INR value itself was accurate, but may not have been flagged correctly due to the outdated reference interval.   02/14/2023 1.3 (H) 0.9 - 1.1 Final       Recommend warfarin 7.5 mg today (1/2 a boost dose to help bring INR back to therapeutic).  Pharmacy will monitor Erich Craven daily and order warfarin doses to achieve specified goal.      Please contact pharmacy as soon as possible if the warfarin needs to be held for a procedure or if the warfarin goals change.      Michelle Mccabe, ElliD

## 2023-02-21 NOTE — CONSULTS
[facility]  Consult Note - Hospitalist Service, GOLD TEAM 18  Date of Admission:  2/21/2023  Consult Requested by: Katelyn Vasquez MD  Reason for Consult: Co-medical management    Assessment & Plan   Erich Craven is a delightful 82-year-old  male admitted on 2/21/2023.  Patient underwent reverse left shoulder arthroplasty on 2/21/2023 with Dr. Katelyn Vasquez.  Patient has a fairly extensive chronic past medical history.  Patient reports feeling well postoperatively.  Per nursing staff, patient is requiring 3.5 L supplemental oxygen at present.  Patient has a history of heart failure with preserved ejection fraction.  Patient utilizes BiPAP at night at home.  Internal medicine was consulted for co-medical management.    #Status post left reverse shoulder arthroplasty  - Orthopedic surgery primary service  - Coumadin dosing per pharmacy for DVT prophylaxis  - As needed pain control  - Physical, occupational therapy evaluations    #Heart failure with preserved ejection fraction  #Coronary artery disease  #Atrial fibrillation on chronic anticoagulation with Coumadin  #Status post pacemaker placement  #Hypertension  #Hyperlipidemia  - Patient appears rate controlled at present  - Continue diltiazem ER  - Continue aspirin, statin therapies  - Hold PTA lisinopril  - Hold PTA metolazone  - Hold PTA spironolactone  - Can incrementally restart as blood pressure allows    #History alcohol use disorder  - Encourage continued cessation    #BPH  - Continue PTA Flomax    #History of DVT and pulmonary embolism in 2012  -Patient reports this after right knee arthroplasty in 2012  - Patient is chronically anticoagulated with Coumadin  - Coumadin dosing per pharmacy    #COPD  - Continue PTA inhaler regimen  - O2 protocol  - Respiratory therapy evaluation for BiPAP settings    #Hypothyroidism  - Continue PTA Synthroid       Clinically Significant Risk Factors Present on  Admission               # Drug Induced Coagulation Defect: home medication list includes an anticoagulant medication    # Hypertension: home medication list includes antihypertensive(s)      # Obesity: Estimated body mass index is 34.74 kg/m  as calculated from the following:    Height as of this encounter: 1.829 m (6').    Weight as of this encounter: 116.2 kg (256 lb 2.8 oz).           Gerardo Vazquez DO, MHS  Hospitalist Service, GOLD TEAM 18  Securely message with [employer] (more info)  Text page via [email or web address] Paging/Directory   See signed in provider for up to date coverage information  ______________________________________________________________________    Chief Complaint   Status post left reverse shoulder arthroplasty.    History is obtained from the patient & electronic medical record.    History of Present Illness   Erich Craven is a delightful 82-year-old  male admitted on 2/21/2023.  Patient underwent reverse left shoulder arthroplasty on 2/21/2023 with Dr. Katelyn Vasquez.  Patient has a fairly extensive chronic past medical history.  Patient reports feeling well postoperatively.  Per nursing staff, patient is requiring 3.5 L supplemental oxygen at present.  Patient has a history of heart failure with preserved ejection fraction.  Patient utilizes BiPAP at night at home.  Internal medicine was consulted for co-medical management.    Past Medical History    Past Medical History:   Diagnosis Date     (HFpEF) heart failure with preserved ejection fraction (H)      Alcohol dependence in remission (H)     in recovery since 1989     Arthritis      BPH with urinary obstruction      CAD (coronary artery disease)     PCI w/ [facility]MER in 2014     Cardiac pacemaker in situ      Chronic diastolic heart failure (H)      Complication of anesthesia     post-op DVT 2012 after knee surgery     COPD (chronic obstructive pulmonary disease) (H)      Diabetes (H)      DVT (deep venous thrombosis) (H)      Dyspnea       Essential hypertension      Fatigue      HLD (hyperlipidemia)      Hx of long term use of blood thinners     Due to PE. Started in 2012     Hypothyroidism      Lymphedema      Mumps      Obesity, Class III, BMI 40-49.9 (morbid obesity) (H)      RIN (obstructive sleep apnea)     has refused CPAP     Pacemaker      Paroxysmal A-fib (H)      PE (pulmonary thromboembolism) (H)      Past Surgical History   Past Surgical History:   Procedure Laterality Date     cardiac stenting       CV RIGHT HEART CATH MEASUREMENTS RECORDED N/A 11/5/2019    Procedure: Right Heart Cath;  Surgeon: Roberto Hernandez MD;  [facility]     ENT SURGERY      tonsillectomy     IMPLANT PACEMAKER       INJECT NERVE BLOCK SUPRASCAPULAR Bilateral 8/26/2021    Procedure: BLOCK, NERVE, SUPRASCAPULAR, Bilateral, under ultrasound guidance;  Surgeon: Swetha Allen MD;  Location: [facility] OR     INJECT NERVE BLOCK SUPRASCAPULAR Bilateral 9/23/2021    Procedure: bilateral suprascapular nerve block;  Surgeon: Swetha Allen MD;  Location: [facility] OR     INJECT NERVE BLOCK SUPRASCAPULAR Left 9/15/2022    Procedure: BLOCK, NERVE, SUPRASCAPULAR- left;  Surgeon: Swetha Allen MD;  Location: [facility] OR     INJECT NERVE BLOCK SUPRASCAPULAR Left 10/20/2022    Procedure: BLOCK, NERVE, SUPRASCAPULAR- left;  Surgeon: Swetha Allen MD;  Location: [facility] OR     INJECT NERVE BLOCK SUPRASCAPULAR Left 1/5/2023    Procedure: BLOCK, NERVE, SUPRASCAPULAR (left);  Surgeon: Swetha Allen MD;  Location: [facility] OR     NERVE BLOCK PERIPHERAL Bilateral 3/10/2022    Procedure: bilateral suprascapular nerve block;  Surgeon: Swetha Allen MD;  Location: [facility] OR     ORTHOPEDIC SURGERY Right 2012    knee replacement     Medications   I have reviewed this patient's current medications     Review of Systems    Negative, aside from aforementioned HPI.    Social History   I have reviewed this patient's social history and updated it with pertinent information if  needed.  Social History     Tobacco Use     Smoking status: Former     Packs/day: 0.50     Years: 25.00     Pack years: 12.50     Types: Cigarettes     Start date:      Quit date:      Years since quittin.1     Smokeless tobacco: Never   Vaping Use     Vaping Use: Never used   Substance Use Topics     Alcohol use: No     Comment: quit in ; recovering     Drug use: No       Family History   I have reviewed this patient's family history and updated it with pertinent information if needed.  Family History   Problem Relation Age of Onset     Brain Cancer Mother      Cerebrovascular Disease Father      Hypertension Father      Anesthesia Reaction No family hx of      Thrombosis No family hx of        Allergies   Allergies   Allergen Reactions     No Known Allergies         Physical Exam   Vital Signs: Temp: 97.9  F (36.6  C) Temp src: Oral BP: 135/85 Pulse: 87   Resp: 16 SpO2: 91 % O2 Device: Nasal cannula Oxygen Delivery: 3 LPM  Weight: 256 lbs 2.79 oz    GENERAL: Alert and oriented x 3; no acute distress; well-nourished.  HEENT: Normocephalic; atraumatic; PERRLA; MMM; NC.  CV: RRR; normal S1, S2; no rubs, murmurs, or gallops.  RESP: Lung fields clear to aucultation B/L; no wheezing or crepitations.  GI: Abdomen is soft, nontender, nondistended; no organomegaly; normal bowel sounds.  : Deferred genital examination.   MSK: Left shoulder immobilizer.  DERM: Skin is intact; no rash, lesions, or skin breakdown.  NEURO: No focal deficits appreciated; strength & sensorium are grossly intact.  PSYCH: No active hallucinations; affect, insight appear within normal limits.    Medical Decision Making       55 MINUTES SPENT BY ME on the date of service doing chart review, history, exam, documentation & further activities per the note.      Data     I have personally reviewed the following data over the past 24 hrs:    Procal: N/A CRP: N/A Lactic Acid: 0.6 (L)       INR:  1.01 PTT:  N/A   D-dimer:  N/A Fibrinogen:   N/A       Imaging results reviewed over the past 24 hrs:   Recent Results (from the past 24 hour(s))   POC US Guidance Needle Placement    Impression    Left interscalene nerve block   Cardiac Device Check - Remote   Result Value    Date Time Interrogation Session [id number]    Implantable Pulse Generator  [place] [employer]    Implantable Pulse Generator Model K063 ADVANTIO    Implantable Pulse Generator Serial Number [id number]    Type Interrogation Session Remote    Clinic Name Angelica    Implantable Pulse Generator Type Pacemaker    Implantable Pulse Generator Implant Date 20150220    Implantable Lead  Medtronic    Implantable Lead Model 4076 CapsureFix Novus    Implantable Lead Serial Number [id number]    Implantable Lead Implant Date 20150220    Implantable Lead Polarity Type Bipolar Lead    Implantable Lead Location Detail 1 UNKNOWN    Implantable Lead Location Right Atrium    Implantable Lead  Medtronic    Implantable Lead Model 4076 CapsureFix Novus    Implantable Lead Serial Number [id number]    Implantable Lead Implant Date 20150220    Implantable Lead Polarity Type Bipolar Lead    Implantable Lead Location Detail 1 UNKNOWN    Implantable Lead Location Right Ventricle    Philip Setting Mode (NBG Code) DDDR    Philip Setting Lower Rate Limit 60    Philip Setting Maximum Tracking Rate 130    Philip Setting Maximum Sensor Rate 130    Philip Setting NAGI Delay Low 250    Philip Setting PAV Delay Low 250    Philip Setting PAV Delay High 220    Philip Setting NAGI Delay High 220    Philip Setting AT Mode Switch Rate 150    Philip Setting AT Mode Switch Mode VDIR    Lead Channel Setting Sensing Polarity Bipolar    Lead Channel Setting Sensing Sensitivity 0.5    Lead Channel Setting Sensing Adaptation Mode Fixed    Lead Channel Setting Sensing Polarity Bipolar    Lead Channel Setting Sensing Sensitivity 1.0    Lead Channel Setting Sensing Adaptation Mode Fixed    Lead Channel Setting  Pacing Polarity Bipolar    Lead Channel Setting Pacing Pulse Width 0.4    Lead Channel Setting Pacing Amplitude 2.0    Lead Channel Setting Pacing Polarity Bipolar    Lead Channel Setting Pacing Pulse Width 0.4    Lead Channel Setting Pacing Amplitude 3.2    Lead Channel Setting Pacing Capture Mode Fixed Pacing    Zone Setting Type Category VT    Zone Setting Vendor Type Category VT    Zone Setting Detection Interval 375    Lead Channel Impedance Value 454    Lead Channel Pacing Threshold Amplitude 0.5    Lead Channel Pacing Threshold Pulse Width 0.4    Lead Channel Impedance Value 384    Battery Date Time of Measurements 20230221030200    Battery Status Beginning of Service    Battery Remaining Longevity 12    Battery Remaining Percentage 24    Philip Statistic Date Time Start 20221116000000    Philip Statistic Date Time End 20230221000000    Philip Statistic RA Percent Paced 6    Philip Statistic RV Percent Paced 0    Atrial Tachy Statistic Date Time Start 20221118000000    Atrial Tachy Statistic Date Time End 20230220000000    Atrial Tachy Statistic AT/AF [place] Percent 1    Episode Statistic Recent Count 10    Episode Statistic Type Category AT/AF    Episode Statistic Vendor Type Category AF    Episode Statistic Recent Count 0    Episode Statistic Type Category SVT    Episode Statistic Vendor Type Category SVT    Episode Statistic Recent Count 18    Episode Statistic Type Category VT    Episode Statistic Vendor Type Category NSVT    Episode Statistic Recent Count 0    Episode Statistic Type Category VT    Episode Statistic Vendor Type Category VT    Episode Statistic Recent Date Time Start 20221116000000    Episode Statistic Recent Date Time End [id number]    Episode Statistic Recent Date Time Start 20221116000000    Episode Statistic Recent Date Time End 20230221000000    Episode Statistic Recent Date Time Start 20221116000000    Episode Statistic Recent Date Time End 20230221000000    Episode Statistic Recent  Date Time Start 20221116000000    Episode Statistic Recent Date Time End [id number]    Episode Identifier APM-97    Episode Type Category Periodic EGM    Episode Date Time [id number]    Episode Identifier ATR-150    Episode Type Category AT/AF    Episode Date Time [id number]    Episode Duration 5    Episode Identifier V-530    Episode Type Category VT    Episode Date Time [id number]    Episode Duration 14    Episode Identifier V-529    Episode Type Category VT    Episode Date Time [id number]    Episode Duration 7    Episode Identifier ATR-149    Episode Type Category AT/AF    Episode Date Time [id number]    Episode Duration 11    Episode Identifier V-528    Episode Type Category VT    Episode Date Time [id number]    Episode Duration 11    Episode Identifier V-527    Episode Type Category VT    Episode Date Time [id number]    Episode Duration 5    Episode Identifier V-526    Episode Type Category VT    Episode Date Time [id number]    Episode Duration 6    Episode Identifier ATR-148    Episode Type Category AT/AF    Episode Date Time [id number]    Episode Duration 5    Episode Identifier ATR-147    Episode Type Category AT/AF    Episode Date Time [id number]    Episode Duration 5    Episode Identifier V-525    Episode Type Category VT    Episode Date Time [id number]    Episode Duration 6    Episode Identifier V-524    Episode Type Category VT    Episode Date Time [id number]    Episode Duration 6    Episode Identifier ATR-146    Episode Type Category AT/AF    Episode Date Time [id number]    Episode Duration 10    Episode Identifier ATR-145    Episode Type Category AT/AF    Episode Date Time [id number]    Episode Duration 7    Episode Identifier V-523    Episode Type Category VT    Episode Date Time [id number]    Episode Duration 16    Episode Identifier V-522    Episode Type Category VT    Episode Date Time [id number]    Episode Duration 6    Episode  Identifier V-521    Episode Type Category VT    Episode Date Time [id number]    Episode Duration 7    Episode Identifier ATR-144    Episode Type Category AT/AF    Episode Date Time [id number]    Episode Duration 5    Episode Identifier ATR-143    Episode Type Category AT/AF    Episode Date Time [id number]    Episode Duration 6    Episode Identifier ATR-142    Episode Type Category AT/AF    Episode Date Time [id number]    Episode Duration 7    Narrative    [place] [employer] Advantio (D) Remote PPM Device Check  AP: 6%  : 0%  Mode: DDDR 60/130  Presenting Rhythm: AP/VS & AS/VS  Heart Rate: adequate rates per hsitograms   Sensing: stable   Pacing Threshold: not recorded   Impedance: stable   Battery Status: 1 year remaining   Atrial Arrhythmia: 10 mode switch episodes logged comprising <1% of the time. 2 EGMs show As>Vs for atrial arrhythmia lasting 3 seconds, ventricular rates 105-155. Taking warfarin. 2 EGMs show As=Vs for SVT lasting 3-8 seconds, rates 150-175bpm.  Ventricular Arrhythmia: 18 ventricular high rates logged. 4 EGMs show As=Vs for SVT lasting 2-4 seconds, rates 145-175bpm.    Care Plan: Remote latitude PPM f/u q 3 months. OV w/ Dr. Portillo due 5/2023. LM with results and next appointment. ELIZABETH Cuevas    I have reviewed and interpreted the device interrogation, settings, programming and nurse's summary. The device is functioning within normal device parameters. I agree with the current findings, assessment and plan.

## 2023-02-21 NOTE — ANESTHESIA PROCEDURE NOTES
Airway       Patient location during procedure: OR       Procedure Start/Stop Times: 2/21/2023 8:18 AM  Staff -        CRNA: Angelica Romero APRN CRNA       Other Anesthesia Staff: Trudi Chahal       Performed By: ROBSON  Consent for Airway        Urgency: elective  Indications and Patient Condition       Indications for airway management: jania-procedural       Induction type:intravenous       Mask difficulty assessment: 1 - vent by mask    Final Airway Details       Final airway type: endotracheal airway       Successful airway: ETT - single  Endotracheal Airway Details        ETT size (mm): 7.5       Cuffed: yes       Cuff volume (mL): 8       Successful intubation technique: direct laryngoscopy       DL Blade Type: MAC 4       Grade View of Cords: 1       Adjucts: stylet       Position: Right       Measured from: lips       Secured at (cm): 23       Bite block used: None    Post intubation assessment        Placement verified by: capnometry, equal breath sounds and chest rise        Number of attempts at approach: 1       Secured with: commercial tube dawn       Ease of procedure: easy       Dentition: Intact and Unchanged    Medication(s) Administered   Medication Administration Time: 2/21/2023 8:18 AM

## 2023-02-21 NOTE — OR NURSING
PACU to Inpatient Nursing Handoff    Patient Erich Craven is a 82 year old male who speaks English.   Procedure Procedure(s):  ARTHROPLASTY, SHOULDER, TOTAL, REVERSE LEFT   Surgeon(s) Primary: Katelyn Vasquez MD     Allergies   Allergen Reactions     No Known Allergies        Isolation  None    Past Medical History   has a past medical history of (HFpEF) heart failure with preserved ejection fraction (H), Alcohol dependence in remission (H), Arthritis, BPH with urinary obstruction, CAD (coronary artery disease), Cardiac pacemaker in situ, Chronic diastolic heart failure (H), Complication of anesthesia, COPD (chronic obstructive pulmonary disease) (H), Diabetes (H), DVT (deep venous thrombosis) (H), Dyspnea, Essential hypertension, Fatigue, HLD (hyperlipidemia), long term use of blood thinners, Hypothyroidism, Lymphedema, Mumps, Obesity, Class III, BMI 40-49.9 (morbid obesity) (H), RIN (obstructive sleep apnea), Pacemaker, Paroxysmal A-fib (H), and PE (pulmonary thromboembolism) (H).    Anesthesia General   Dermatome Level  NA   Preop Meds TXA - time given: 0657   Nerve block Brachial plexus .  Location:left. Med:bupivacaine and Exparel (liposomal bupivacaine). Time given: 0736- minimal coverage. MDA and block team aware.   Intraop Meds fentanyl (Sublimaze): 200 mcg total  ondansetron (Zofran): last given at 1008  Decadron 8 mg @ 0837   Local Meds No   Antibiotics cefazolin (Ancef) - last given at 0824     Pain Patient Currently in Pain: yes- 5/10- improving   PACU meds  acetaminophen (Tylenol): 975 mg (total dose) last given at 1135   fentanyl (Sublimaze): 100 mcg (total dose) last given at 1107   hydromorphone (Dilaudid): 0.4 mg (total dose) last given at 1139   oxycodone (Roxicodone): 5 mg (total dose) last given at 1135    PCA / epidural No   Capnography Respiratory Monitoring (EtCO2): 37 mmHg   Telemetry ECG Rhythm: Normal sinus rhythm   Inpatient Telemetry Monitor Ordered? No        Labs Glucose Lab  Results   Component Value Date     02/21/2023     10/13/2022     06/21/2021       Hgb Lab Results   Component Value Date    HGB 13.0 02/02/2023    HGB 12.2 06/14/2021       INR Lab Results   Component Value Date    INR 1.01 02/21/2023    INR 2.4 11/02/2022    INR 2.80 06/14/2021      PACU Imaging Completed     Wound/Incision Incision/Surgical Site 02/21/23 Left Shoulder (Active)   Incision Assessment UTV 02/21/23 1200   Incision Drainage Amount None 02/21/23 1200   Dressing Intervention Clean, dry, intact 02/21/23 1200   Number of days: 0      CMS  WDL      Equipment ice pack and shoulder sling   Other LDA       IV Access Peripheral IV 02/21/23 Right Lower forearm (Active)   Site Assessment L 02/21/23 1200   Line Status Infusing 02/21/23 1200   Phlebitis Scale 0-->no symptoms 02/21/23 1200   Infiltration Scale 0 02/21/23 1200   Number of days: 0       Peripheral IV 02/21/23 Right Hand (Active)   Site Assessment [facility] 02/21/23 1200   Line Status Saline locked 02/21/23 1200   Phlebitis Scale 0-->no symptoms 02/21/23 1200   Infiltration Scale 0 02/21/23 1200   Number of days: 0      Blood Products none  mL   Intake/Output Date 02/21/23 0700 - 02/22/23 0659   Shift 8407-8599 2958-8811 7463-7372 24 Hour Total   INTAKE   I.V. 800   800   Shift Total(mL/kg) 800(6.88)   800(6.88)   OUTPUT   Blood 250   250   Shift Total(mL/kg) 250(2.15)   250(2.15)   Weight (kg) 116.2 116.2 116.2 116.2      Drains / Reddy     Time of void PreOp Void Prior to Procedure: 0700 (02/21/23 0705)    PostOp  250 mL @ 1100    Diapered? No   Bladder Scan  NA   PO   250 water water     Vitals    B/P: 111/73  T: 97.9  F (36.6  C)    Temp src: Oral  P:  Pulse: 84 (02/21/23 1200)          R: 12  O2:  SpO2: 93 %    O2 Device: Nasal cannula (02/21/23 1200)    Oxygen Delivery: 4 LPM (02/21/23 1200)         Family/support present daughter in waiting room, updated via volunteer   Patient belongings  two bags, cane, and 1 suitcase  w/Bipap   Patient transported on cart and air mat   DC meds/scripts (obs/outpt) Not applicable   Inpatient Pain Meds Released? Yes       Special needs/considerations None   Tasks needing completion None       Keisha Pires RN   Adult PACU [id number]

## 2023-02-21 NOTE — PLAN OF CARE
VS: /85   Pulse 87   Temp 97.9  F (36.6  C) (Oral)   Resp 16   Ht 1.829 m (6')   Wt 116.2 kg (256 lb 2.8 oz)   SpO2 91%   BMI 34.74 kg/m     O2: 3.5 LPM via nasal cannula  Uses Bipap at night- home Bipap set up in room. Distilled water ordered from [facility].    Output: Voids without difficulty in urinal- hx of BPH   Last BM: 2/20 before surgery. Active bowel sounds   Activity: Ax1 with cane  - Ambulated from cart to bed.    Skin: Scattered bruising to all extremities. Dry, flaky skin on BLE. L shoulder surgical incision   Pain: C/o L shoulder surgical incision pain managed with PRN and scheduled medications and cold packs   CMS: intact   Dressing: L shoulder incision- CDI. L shoulder sling on at all times.    Diet: Regular diet, thin liquids, takes pills whole   LDA: R wrist PIV saline locked, patent. R upper forearm infusing LR at 100 mL/hr   Equipment: IV pole, personal cane and bipap, Personal belongings   Plan: Continue plan of care   Additional Info: A&Ox4. Denies headache, chest pain ,SOB, numbness or tingling. Triggered sepsis protocol- lactic acid 0.6. Pt has a special code status: Yes to compressions, defibrillation, and code drugs; No to intubation.  Uses call light appropriately and makes needs known.

## 2023-02-21 NOTE — BRIEF OP NOTE
[facility]    Brief Operative Note    Pre-operative diagnosis: Left rotator cuff tear arthropathy [M75.102, M12.812]  Post-operative diagnosis Same as pre-operative diagnosis    Procedure: Procedure(s):  ARTHROPLASTY, SHOULDER, TOTAL, REVERSE LEFT  Surgeon: Surgeon(s) and Role:     * Katelyn Vasquez MD - Primary  Anesthesia: Choice   Estimated Blood Loss: 250ml    Drains: None  Specimens: * No specimens in log *  Findings:   End stage arthrosis with glenoid erosion.  Complications: None.  Implants:   Implant Name Type Inv. Item Serial No.  Lot No. LRB No. Used Action   IMP BASEPLATE RVRS SHLDR ZIM  AUG [id number] - [id number] Total Joint Component/Insert IMP BASEPLATE RVRS SHLDR ZIM  AUG [id number]  [employer] [id number] Left 1 Implanted   IMP SCR CENTRAL BIOMET REV SHLDR 6.5X30MM [id number] - [id number] Metallic Hardware/[place] IMP SCR CENTRAL BIOMET REV SHLDR 6.5X30MM [id number]  [employer] [id number] Left 1 Implanted   IMP GLENOSPHERE ZIM VERSA-DIAL 40MM STD [id number] - [id number] Total Joint Component/Insert IMP GLENOSPHERE ZIM VERSA-DIAL 40MM STD [id number]  [employer] [id number] Left 1 Implanted   IMP SCR LOCKING BIOM REV SHLDR 3.5 HEX 4.98G44XL 696455 - [id number] Metallic Hardware/[place] IMP SCR LOCKING BIOM REV SHLDR 3.5 HEX 4.57B92FO 773437  [employer] [id number] Left 1 Implanted   IMP SCR LOCKING BIOM REV SHLDR 3.5 HEX 4.21G09BW 602261 - [id number] Metallic Hardware/[place] IMP SCR LOCKING BIOM REV SHLDR 3.5 HEX 4.90H58ZW 428357  [employer] [id number] Left 1 Implanted   IMP SCR LOCKING BIOM REV SHLDR 3.5 HEX 4.83U63LB 139488 - [id number] Metallic Hardware/[place] IMP SCR LOCKING BIOM REV SHLDR 3.5 HEX 4.46J23DP 724669  [employer] [id number] Left 1 Implanted   IMP SCR LOCKING BIOM REV SHLDR 3.5 HEX 4.02U74QW 121759 - [id number] Metallic Hardware/[place] IMP SCR LOCKING BIOM REV SHLDR 3.5 HEX 4.08Q99LE 835458  [employer] [id number]  Left 1 Implanted   stem 20mm    BIOMET [id number] Left 1 Implanted   IMP BEARING HUMERAL ZIM 40MM STD PRLNG [id number] - [id number] Total Joint Component/Insert IMP BEARING HUMERAL ZIM 40MM STD PRLNG [id number]  [employer] [id number] Left 1 Implanted   IMP HUMERAL TRAY ZIM RVRS SHLDR STD [id number] - [id number] Total Joint Component/Insert IMP HUMERAL TRAY ZIM RVRS SHLDR STD [id number]  [employer] [id number] Left 1 Implanted

## 2023-02-22 ENCOUNTER — DOCUMENTATION ONLY (OUTPATIENT)
Dept: MEDSURG UNIT | Facility: CLINIC | Age: 83
End: 2023-02-22

## 2023-02-22 ENCOUNTER — APPOINTMENT (OUTPATIENT)
Dept: OCCUPATIONAL THERAPY | Facility: CLINIC | Age: 83
End: 2023-02-22
Attending: ORTHOPAEDIC SURGERY
Payer: MEDICARE

## 2023-02-22 LAB
ALBUMIN SERPL BCG-MCNC: 4 G/DL (ref 3.5–5.2)
ALP SERPL-CCNC: 98 U/L (ref 40–129)
ALT SERPL W P-5'-P-CCNC: 13 U/L (ref 10–50)
ANION GAP SERPL CALCULATED.3IONS-SCNC: 11 MMOL/L (ref 7–15)
AST SERPL W P-5'-P-CCNC: 21 U/L (ref 10–50)
BASOPHILS # BLD AUTO: 0 10E3/UL (ref 0–0.2)
BASOPHILS NFR BLD AUTO: 0 %
BILIRUB SERPL-MCNC: 0.3 MG/DL
BUN SERPL-MCNC: 15.1 MG/DL (ref 8–23)
CALCIUM SERPL-MCNC: 9.5 MG/DL (ref 8.8–10.2)
CHLORIDE SERPL-SCNC: 98 MMOL/L (ref 98–107)
CREAT SERPL-MCNC: 0.87 MG/DL (ref 0.67–1.17)
DEPRECATED HCO3 PLAS-SCNC: 28 MMOL/L (ref 22–29)
EOSINOPHIL # BLD AUTO: 0 10E3/UL (ref 0–0.7)
EOSINOPHIL NFR BLD AUTO: 0 %
ERYTHROCYTE [DISTWIDTH] IN BLOOD BY AUTOMATED COUNT: 14.9 % (ref 10–15)
GFR SERPL CREATININE-BSD FRML MDRD: 86 ML/MIN/1.73M2
GLUCOSE BLDC GLUCOMTR-MCNC: 148 MG/DL (ref 70–99)
GLUCOSE SERPL-MCNC: 123 MG/DL (ref 70–99)
HCT VFR BLD AUTO: 38.7 % (ref 40–53)
HGB BLD-MCNC: 12.2 G/DL (ref 13.3–17.7)
IMM GRANULOCYTES # BLD: 0.1 10E3/UL
IMM GRANULOCYTES NFR BLD: 1 %
INR PPP: 0.98 (ref 0.85–1.15)
LACTATE SERPL-SCNC: 1.2 MMOL/L (ref 0.7–2)
LYMPHOCYTES # BLD AUTO: 1.4 10E3/UL (ref 0.8–5.3)
LYMPHOCYTES NFR BLD AUTO: 8 %
MCH RBC QN AUTO: 30.3 PG (ref 26.5–33)
MCHC RBC AUTO-ENTMCNC: 31.5 G/DL (ref 31.5–36.5)
MCV RBC AUTO: 96 FL (ref 78–100)
MONOCYTES # BLD AUTO: 1.6 10E3/UL (ref 0–1.3)
MONOCYTES NFR BLD AUTO: 9 %
NEUTROPHILS # BLD AUTO: 14.6 10E3/UL (ref 1.6–8.3)
NEUTROPHILS NFR BLD AUTO: 82 %
NRBC # BLD AUTO: 0 10E3/UL
NRBC BLD AUTO-RTO: 0 /100
PLATELET # BLD AUTO: 249 10E3/UL (ref 150–450)
POTASSIUM SERPL-SCNC: 3.7 MMOL/L (ref 3.4–5.3)
PROT SERPL-MCNC: 6.8 G/DL (ref 6.4–8.3)
RBC # BLD AUTO: 4.03 10E6/UL (ref 4.4–5.9)
SODIUM SERPL-SCNC: 137 MMOL/L (ref 136–145)
WBC # BLD AUTO: 17.7 10E3/UL (ref 4–11)

## 2023-02-22 PROCEDURE — 250N000013 HC RX MED GY IP 250 OP 250 PS 637: Performed by: ORTHOPAEDIC SURGERY

## 2023-02-22 PROCEDURE — 82962 GLUCOSE BLOOD TEST: CPT

## 2023-02-22 PROCEDURE — 97110 THERAPEUTIC EXERCISES: CPT | Mod: GO

## 2023-02-22 PROCEDURE — 36415 COLL VENOUS BLD VENIPUNCTURE: CPT | Performed by: INTERNAL MEDICINE

## 2023-02-22 PROCEDURE — 80053 COMPREHEN METABOLIC PANEL: CPT | Performed by: INTERNAL MEDICINE

## 2023-02-22 PROCEDURE — 99232 SBSQ HOSP IP/OBS MODERATE 35: CPT | Performed by: INTERNAL MEDICINE

## 2023-02-22 PROCEDURE — 85610 PROTHROMBIN TIME: CPT | Performed by: INTERNAL MEDICINE

## 2023-02-22 PROCEDURE — 250N000013 HC RX MED GY IP 250 OP 250 PS 637: Performed by: INTERNAL MEDICINE

## 2023-02-22 PROCEDURE — 97165 OT EVAL LOW COMPLEX 30 MIN: CPT | Mod: GO

## 2023-02-22 PROCEDURE — 85025 COMPLETE CBC W/AUTO DIFF WBC: CPT | Performed by: INTERNAL MEDICINE

## 2023-02-22 PROCEDURE — 83605 ASSAY OF LACTIC ACID: CPT | Performed by: INTERNAL MEDICINE

## 2023-02-22 PROCEDURE — 97535 SELF CARE MNGMENT TRAINING: CPT | Mod: GO

## 2023-02-22 PROCEDURE — 250N000011 HC RX IP 250 OP 636: Performed by: ORTHOPAEDIC SURGERY

## 2023-02-22 RX ORDER — AMOXICILLIN 250 MG
1 CAPSULE ORAL 2 TIMES DAILY
Qty: 30 TABLET | Refills: 0 | DISCHARGE
Start: 2023-02-22 | End: 2023-04-13

## 2023-02-22 RX ORDER — OXYCODONE HYDROCHLORIDE 5 MG/1
5 TABLET ORAL EVERY 4 HOURS PRN
Qty: 26 TABLET | Refills: 0 | Status: SHIPPED | OUTPATIENT
Start: 2023-02-22 | End: 2023-02-22

## 2023-02-22 RX ORDER — AMOXICILLIN 250 MG
1 CAPSULE ORAL 2 TIMES DAILY
Qty: 30 TABLET | Refills: 0 | Status: SHIPPED | OUTPATIENT
Start: 2023-02-22 | End: 2023-02-22

## 2023-02-22 RX ORDER — POLYETHYLENE GLYCOL 3350 17 G/17G
17 POWDER, FOR SOLUTION ORAL DAILY
Qty: 10 PACKET | Refills: 0 | Status: SHIPPED | OUTPATIENT
Start: 2023-02-22 | End: 2023-02-22

## 2023-02-22 RX ORDER — POLYETHYLENE GLYCOL 3350 17 G/17G
17 POWDER, FOR SOLUTION ORAL DAILY
Qty: 10 PACKET | Refills: 0 | DISCHARGE
Start: 2023-02-22 | End: 2023-02-27

## 2023-02-22 RX ORDER — OXYCODONE HYDROCHLORIDE 5 MG/1
5 TABLET ORAL EVERY 4 HOURS PRN
Qty: 26 TABLET | Refills: 0 | Status: SHIPPED | DISCHARGE
Start: 2023-02-22 | End: 2023-02-24

## 2023-02-22 RX ORDER — WARFARIN SODIUM 7.5 MG/1
7.5 TABLET ORAL
Status: COMPLETED | OUTPATIENT
Start: 2023-02-22 | End: 2023-02-22

## 2023-02-22 RX ORDER — POLYETHYLENE GLYCOL 3350 17 G/17G
1 POWDER, FOR SOLUTION ORAL DAILY
Qty: 7 PACKET | Refills: 0 | DISCHARGE
Start: 2023-02-22

## 2023-02-22 RX ORDER — METOLAZONE 2.5 MG/1
2.5 TABLET ORAL
Status: DISCONTINUED | OUTPATIENT
Start: 2023-02-22 | End: 2023-02-24 | Stop reason: HOSPADM

## 2023-02-22 RX ORDER — SPIRONOLACTONE 25 MG
25 TABLET ORAL EVERY OTHER DAY
Status: DISCONTINUED | OUTPATIENT
Start: 2023-02-22 | End: 2023-02-24 | Stop reason: HOSPADM

## 2023-02-22 RX ORDER — ACETAMINOPHEN 325 MG/1
650 TABLET ORAL EVERY 4 HOURS PRN
Qty: 60 TABLET | Refills: 0 | Status: SHIPPED | OUTPATIENT
Start: 2023-02-24 | End: 2023-02-22

## 2023-02-22 RX ORDER — ACETAMINOPHEN 325 MG/1
650 TABLET ORAL EVERY 4 HOURS PRN
Qty: 60 TABLET | Refills: 0 | DISCHARGE
Start: 2023-02-24 | End: 2023-02-27

## 2023-02-22 RX ADMIN — DILTIAZEM HYDROCHLORIDE 300 MG: 300 CAPSULE, COATED, EXTENDED RELEASE ORAL at 09:18

## 2023-02-22 RX ADMIN — POLYETHYLENE GLYCOL 3350 17 G: 17 POWDER, FOR SOLUTION ORAL at 09:19

## 2023-02-22 RX ADMIN — FLUTICASONE FUROATE AND VILANTEROL TRIFENATATE 1 PUFF: 200; 25 POWDER RESPIRATORY (INHALATION) at 08:52

## 2023-02-22 RX ADMIN — METOLAZONE 2.5 MG: 2.5 TABLET ORAL at 13:53

## 2023-02-22 RX ADMIN — SPIRONOLACTONE 25 MG: 25 TABLET ORAL at 13:53

## 2023-02-22 RX ADMIN — ALBUTEROL SULFATE 2 PUFF: 90 AEROSOL, METERED RESPIRATORY (INHALATION) at 16:32

## 2023-02-22 RX ADMIN — UMECLIDINIUM 1 PUFF: 62.5 AEROSOL, POWDER ORAL at 08:53

## 2023-02-22 RX ADMIN — ISOSORBIDE MONONITRATE 30 MG: 30 TABLET, EXTENDED RELEASE ORAL at 09:18

## 2023-02-22 RX ADMIN — OXYCODONE HYDROCHLORIDE 10 MG: 10 TABLET ORAL at 16:32

## 2023-02-22 RX ADMIN — ACETAMINOPHEN 975 MG: 325 TABLET ORAL at 20:03

## 2023-02-22 RX ADMIN — ATORVASTATIN CALCIUM 40 MG: 40 TABLET, FILM COATED ORAL at 20:04

## 2023-02-22 RX ADMIN — OXYCODONE HYDROCHLORIDE 5 MG: 5 TABLET ORAL at 01:47

## 2023-02-22 RX ADMIN — ALCOHOL 1 TABLET: 70.47 GEL TOPICAL at 09:18

## 2023-02-22 RX ADMIN — SENNOSIDES AND DOCUSATE SODIUM 1 TABLET: 50; 8.6 TABLET ORAL at 09:18

## 2023-02-22 RX ADMIN — LEVOTHYROXINE SODIUM 175 MCG: 125 TABLET ORAL at 09:17

## 2023-02-22 RX ADMIN — VENLAFAXINE 75 MG: 75 TABLET ORAL at 09:18

## 2023-02-22 RX ADMIN — TAMSULOSIN HYDROCHLORIDE 0.4 MG: 0.4 CAPSULE ORAL at 09:18

## 2023-02-22 RX ADMIN — OXYCODONE HYDROCHLORIDE 10 MG: 10 TABLET ORAL at 05:49

## 2023-02-22 RX ADMIN — VENLAFAXINE 75 MG: 75 TABLET ORAL at 20:03

## 2023-02-22 RX ADMIN — OXYCODONE HYDROCHLORIDE 10 MG: 10 TABLET ORAL at 21:37

## 2023-02-22 RX ADMIN — SENNOSIDES AND DOCUSATE SODIUM 1 TABLET: 50; 8.6 TABLET ORAL at 20:03

## 2023-02-22 RX ADMIN — OXYCODONE HYDROCHLORIDE 10 MG: 10 TABLET ORAL at 11:10

## 2023-02-22 RX ADMIN — ACETAMINOPHEN 975 MG: 325 TABLET ORAL at 04:51

## 2023-02-22 RX ADMIN — WARFARIN SODIUM 7.5 MG: 7.5 TABLET ORAL at 18:10

## 2023-02-22 RX ADMIN — ACETAMINOPHEN 975 MG: 325 TABLET ORAL at 13:52

## 2023-02-22 RX ADMIN — CEFAZOLIN SODIUM 2 G: 2 INJECTION, SOLUTION INTRAVENOUS at 00:28

## 2023-02-22 RX ADMIN — ASPIRIN 81 MG: 81 TABLET, CHEWABLE ORAL at 09:17

## 2023-02-22 ASSESSMENT — ACTIVITIES OF DAILY LIVING (ADL)
ADLS_ACUITY_SCORE: 30
ADLS_ACUITY_SCORE: 28
ADLS_ACUITY_SCORE: 28
ADLS_ACUITY_SCORE: 29
ADLS_ACUITY_SCORE: 28
ADLS_ACUITY_SCORE: 30
ADLS_ACUITY_SCORE: 30
ADLS_ACUITY_SCORE: 28
ADLS_ACUITY_SCORE: 30
ADLS_ACUITY_SCORE: 28

## 2023-02-22 NOTE — PLAN OF CARE
VS: VSS.   O2: >90% on 2-3 LPM, expiratory wheezes, PRN inhaler given, pt reports improvement. Uses Bipap overnight.   Output: Voiding frequently in urinal. Per pt he has urgency.   Last BM: 2/20   Activity: Up with 1 assist and GB    Skin: L shoulder surgical incision, Scattered bruising    Pain: 5 mg oxycodone q3rhs   CMS: Intact, +3 edema in bilateral lower extremities   Dressing: CDI    Diet: Regular   LDA: PIV SL   Plan: TBD   Additional Info: Pt wants to talk to Social work/Care Coordinator, he lives alone and did not do well going home after his first surgery per his report.

## 2023-02-22 NOTE — PLAN OF CARE
Goal Outcome Evaluation:      Plan of Care Reviewed With: patient    Overall Patient Progress: improving     VS: VSS. Denies CP/SOB. A/O x4.   O2: >90% on 1-2 LPM, unable to wean off. Dr. Phillip said ok to keep patient >88%. IS encouraged. H/o COPD, uses BIPAP at night. On cont pulse ox,   Did get audibly wheezy/SOB this evening, PRN albuterol inhaler given   Output: Voiding adequate amounts w/o pain or difficulty   Last BM: 2/20   Activity: Up with 1 assist and GB    Skin: L shoulder surgical incision, Scattered bruising    Pain: Pain in L shoulder, managing with PRN oxycodone. Scheduled tylenol. Ice Packs.    CMS: Intact    Dressing: CDI    Diet: Regular, tolerating well.    LDA: PIV SL   Equipment: IV pole, shoulder immobilizer, personal belongings on bedside table (dentures).    Plan: TBD   Additional Info:

## 2023-02-22 NOTE — PROGRESS NOTES
Orthopaedic Surgery Progress Note 02/22/2023    Subjective  No acute events overnight.  Pain mildly controlled with PRNs, block wearing off. Denies new numbness, tingling, or weakness.  Tolerating diet without nausea or vomiting.  Voiding spontaneously.  +flatus, -BM.   Denies fevers, chills, chest pain, SOB.      Objective  Temp: (!) 96.6  F (35.9  C) Temp src: Oral BP: (!) 156/75 Pulse: 66   Resp: 16 SpO2: 93 % O2 Device: Nasal cannula Oxygen Delivery: 2 LPM    Exam:  Gen: No acute distress, resting comfortably in bed.  Resp: Non-labored breathing  Cardio: Regular rate via peripheral pulse  MSK:  UE:  - Dressings c/d/I  - Fires EPL, FPL, Intrinsics  - SILT medial/radial/ulnar/axillary nerves  - Radial pulse 2+, hand wwp    Assessment: Erich Craven is a 82 year old male s/p left rTSA on 2/21 with Dr. Vasquez. Doing well.    Plan:  Ortho Primary  Activity: Up with assist.  Weight bearing status: NWB LUE  Antibiotics: Ancef x24 hours perioperatively.  Diet: Begin with clear fluids and progress diet as tolerated.  DVT prophylaxis: ASA, SCD  Bracing/Splinting: Ultrasling at all times, to be kept clean, dry, and intact until follow-up.  Wound Care: Aquacel, keep CDI  Pain management: transition from IV to orals as tolerated.   X-rays: completed  Physical Therapy:  ROM, ADL's.  Occupational Therapy: ADL's.  Labs: Trend Hgb on POD #1.  Consults: PT, OT. Hospitalist, appreciate assistance in caring for this patient.  Follow-up: Clinic with Dr. Vasquez in 2 weeks    Disposition: Pending progress with therapies, pain control on orals, and medical stability, anticipate discharge to home vs TCU on POD #1-2.    Gio Blankenship MD  Orthopaedic Surgery PGY-1     Future Appointments   Date Time Provider Department [place]   2/22/2023  8:15 AM Sol Garcia, TONIR UROT [place]   2/28/2023  9:45 AM CS LAB [facility] CS   3/8/2023  9:00 AM Katelyn Vasquez MD [facility]   4/5/2023  9:00 AM Katelyn Vasquez MD [facility]   5/23/2023 12:00 AM SANDOVAL TECH1 [facility] CL[facility]

## 2023-02-22 NOTE — PLAN OF CARE
"Goal Outcome Evaluation:      Plan of Care Reviewed With: patient    Overall Patient Progress: improving    VS: VSS. Denies CP/SOB. A/O x4.   O2: >90% on 3 LPM, expiratory wheezes, denied SOB feels \"congested\", PRN inhaler given. IS encouraged.     Output: Voiding adequate amounts w/o pain or difficulty using urinal    Last BM: 2/20   Activity: Up with 1 assist and GB    Skin: L shoulder surgical incision, Scattered bruising    Pain: Pain in L shoulder, managing with PRN oxycodone. Scheduled tylenol. Ice Packs.    CMS: Intact    Dressing: CDI    Diet: Regular, tolerating well.    LDA: PIV TKO    Equipment: IV pole, shoulder immobilizer, personal belongings on bedside table (dentures).    Plan: TBD   Additional Info: Pt wants to talk to Social work/Care Coordinator, he lives alone and did not do well going home after his first surgery per his report.       "

## 2023-02-22 NOTE — PROGRESS NOTES
Prior Authorization **INITIATED**    Medication: Oxycodone 5mg tabs  Insurance Company: [employer] - Phone [phone] Fax [phone]  Pharmacy Filling the Rx: [place], [place] - [address]  Filling Pharmacy Phone: [phone]  Filling Pharmacy Fax: [phone]  Start Date: 2/22/2023  Reference #: CoverMyMes Key: [id number]  Comments:  Plan limits to #42 tabs per 90 days. PA required for quantities beyond these limits. Discharge pharmacy sent patient with supply while auth is pending. Will retroactively bill once determination received.      Nayely Schaffer CPhT  [place] Discharge Pharmacy Liaison  Pronouns: She/Her/Hers    [facility]  [address]  [address], [place] [address]   [email or web address]   Phone: [phone]  Pager: [phone]  Fax: [phone]

## 2023-02-22 NOTE — OP NOTE
"DATE OF PROCEDURE: 2/21/23    PREOPERATIVE DIAGNOSIS: Left cuff tear arthropathy.     POSTOPERATIVE DIAGNOSIS: Left cuff tear arthropathy.     PROCEDURE: Left reverse total shoulder arthroplasty.     STAFF SURGEON: Katelyn Vasquez MD.   ASSISTANT: Sujit Roe MD; Gio Blankenship MD    ANESTHESIA: General endotracheal anesthesia.   ESTIMATED BLOOD LOSS: 250 mL.   COMPLICATIONS: None.     IMPLANTS:   1. Biomet Comprehensive Shoulder small augment baseplate with 30mm central screw and 4 peripheral locking screws (25,30,15,15)  2. Biomet Comprehensive Shoulder 40mm Glenosphere at \"B\" offset inferiorly  3. Biomet Comprehensive Shoulder 20x55 stem  4. Biomet Comprehensive Shoulder Standard tray and bearing    BRIEF PATIENT HISTORY: This patient is known to me from clinic where we have discussed the radiographic and physical exam findings. The patient's shoulder has been causing lifestyle limiting pain and he feels that nonoperative management has not adequately relieved these symptoms. Therefore, the patient wished to consider surgical options. We discussed reverse total shoulder arthroplasty including the risks and benefits and perioperative rehabilitation plan. The patient had the opportunity for questions to be answered and wished to proceed with surgery. Informed consent was completed.     DESCRIPTION OF PROCEDURE: The patient was identified in the preoperative area and the correct left shoulder was marked for surgery. The patient was provided an interscalene block by our anesthesia colleagues. He was taken to the operating room where he was surrendered to general endotracheal anesthesia. He was moved to the operating table in the beachchair position with all bony prominences well padded. The head was placed in a head dawn with the neck in neutral position. The left upper extremity was prepped and draped in the usual sterile fashion. A timeout was held in accordance with hospital policy, confirming correct patient, " site, side, procedure and administration of IV antibiotics prior to incision. I completed a deltopectoral incision and approach. I identified the cephalic vein and brought this medially. I came underneath the deltoid and freed the subdeltoid adhesions. The subacromial space was narrowed with superior cuff tearing. The biceps was tenotomized and then sewn to the upper lateral border of the pec. I palpated the axillary nerve medially and laterally performing a tug test confirming location and continuity of the nerve. This was performed multiple times throughout the procedure including once prior to closure. I identified the anterior circumflex humeral vessels and ligated and divided these. I then released the capsule off the anterior inferior humerus and dislocated the humerus anteriorly. There was eburnated bone over the humeral head.  I entered the humeral canal and reamed up to a size 20 (19 was clearly too loose with rotational instability). I cut the humeral head in 25 degrees of retroversion. I prepared the proximal humerus for a size 20x55 reverse stem. I then turned my attention to the glenoid. I performed anterior inferior capsular releases and removed the labrum circumferentially. I prepared the glenoid for the superior augment baseplate which was impacted and screws were placed. I then applied the glenosphere and trialed a polyethylene liner. A standard tray and liner provided forward elevation to 165 degrees with external rotation at the side to 50 without booking anteriorly. There was also internal rotation to the abdomen and contralateral shoulder without impingement. I removed all trial humeral components. I impacted the stem in the appropriate version. I impacted the final polyethylene liner with the same range of motion as above. There was no adequate subscap to repair. I then copiously irrigated the wound. I irrigated the wound then with 15 mL a sterile Betadine and 500 mL of saline. This was then  irrigated out with antibiotic saline. The wound was then closed in layered fashion with absorbable suture. Steri-Strips and soft sterile dressing were repaired. The arm was placed into an abduction sling and the patient was extubated and transported to recovery in stable condition. There were no apparent intraoperative complications.     POSTOPERATIVE PLAN:   1. The patient will be admitted to the Orthopedic Service and will begin physical therapy to mobilize out of bed.There will be no shoulder range of motion for 4 weeks but the patient can begin immediate hand, wrist and elbow range of motion. We will resume coumadin given history of DVT/PE.  2. The patient will be seen by me in the clinic for wound check at 2 weeks.     NITA ARAYA MD

## 2023-02-22 NOTE — PROGRESS NOTES
[facility]    Medicine Progress Note - Hospitalist Service, GOLD TEAM 17    Date of Admission:  2/21/2023    Assessment & Plan    Erich Craven is a delightful 82-year-old  male admitted on 2/21/2023.  Patient underwent reverse left shoulder arthroplasty on 2/21/2023 with Dr. Katelyn Vasquez.  Patient has a fairly extensive chronic past medical history..  Patient has a history of heart failure with preserved ejection fraction.  Patient utilizes BiPAP at night at home.  Internal medicine was consulted for co-medical management.     #Status post left reverse shoulder arthroplasty  - Orthopedic surgery primary service  - Coumadin dosing per pharmacy for DVT prophylaxis ( H/O DVT in the past)  - As needed pain control  - Physical, occupational therapy evaluations   -- Post op Hgb at 12.2      #Heart failure with preserved ejection fraction  #Coronary artery disease  #Atrial fibrillation on chronic anticoagulation with Coumadin  #Status post pacemaker placement  #Hypertension  #Hyperlipidemia  - Patient appears rate controlled at present  - Continue diltiazem ER  - Continue aspirin, statin therapies  - Resume  PTA lisinopril  - Resume  PTA metolazone  - Resume  PTA spironolactone  BMP within acceptable limits      #History alcohol use disorder  - Encourage continued cessation     #BPH  - Continue PTA Flomax     #History of DVT and pulmonary embolism in 2012  -Patient reports this after right knee arthroplasty in 2012  - Patient is chronically anticoagulated with Coumadin  - Coumadin dosing per pharmacy     #COPD  - Continue PTA inhaler regimen  - O2 protocol  - Respiratory therapy evaluation for BiPAP settings     #Hypothyroidism  - Continue PTA Synthroid          Diet: Advance Diet as Tolerated: Regular Diet Adult  Diet    DVT Prophylaxis: Warfarin  Reddy Catheter: Not present  Lines: None     Cardiac Monitoring: ACTIVE order. Indication: Procedural area  Code  Status:  Special code     Clinically Significant Risk Factors Present on Admission               # Drug Induced Coagulation Defect: home medication list includes an anticoagulant medication    # Hypertension: home medication list includes antihypertensive(s)      # Obesity: Estimated body mass index is 34.74 kg/m  as calculated from the following:    Height as of this encounter: 1.829 m (6').    Weight as of this encounter: 116.2 kg (256 lb 2.8 oz).           Disposition Plan     Expected Discharge Date: 02/22/2023                  Kenji Cunningham MD  Hospitalist Service, GOLD TEAM [address] [place] [address]  Securely message with [employer] (more info)  Text page via [employer] Paging/Directory   See signed in provider for up to date coverage information  ______________________________________________________________________    Interval History   Charts reviewed and patient examined.  No acute complaints overnight.   Eating and drinking well. Passing gas  No chest pain or SOB  Pleasant mood     Physical Exam   Vital Signs: Temp: (!) 96.5  F (35.8  C) Temp src: Oral BP: (!) 149/71 Pulse: 63   Resp: 16 SpO2: 94 % O2 Device: Nasal cannula Oxygen Delivery: 2 LPM  Weight: 256 lbs 2.79 oz    General Appearance: Awake, alert and not in distress  Respiratory: Clear breath sounds bilaterally   Cardiovascular: Normal heart sounds. No murmurs   GI: Soft, non tender. Normal bowel sounds   Skin: No bruising or bleeding   MSK: Left shoulder with intcat dressing. No distal deficits.   Other:Awake, alert and orientated X 3       Medical Decision Making       25 MINUTES SPENT BY ME on the date of service doing chart review, history, exam, documentation & further activities per the note.  MANAGEMENT DISCUSSED with the following over the past 24 hours: Bedside RN, patient, orthopedic team and care managemnet team        Data   Recent Labs   Lab 02/22/23  0704 02/21/23  1044 02/21/23  0650  02/21/23  0632   INR  --   --  1.01  --    * 131*  --  97

## 2023-02-22 NOTE — PROGRESS NOTES
RT department currently out of hospital CPAP/BIPAP units so unable to offer this therapy at this time. RT department recommends that If this a therapy used at home for the pt to have their personal unit brought to them.

## 2023-02-22 NOTE — PROGRESS NOTES
02/22/23 0824   Appointment Info   Signing Clinician's Name / Credentials (OT) Sol Garcia, OTR/L   Living Environment   People in Home facility resident   Current Living Arrangements independent living facility   Home Accessibility no concerns   Transportation Anticipated family or friend will provide   Living Environment Comments Pt lives in independent living facility. Bathroom has a tub/shower with cutout, shower chair, and grab bars in shower. Toilet has a raised toilet seat and uses counter for toilet transfers.   Self-Care   Usual Activity Tolerance moderate   Current Activity Tolerance fair   Equipment Currently Used at Home cane, straight;shower chair;raised toilet seat;walker, standard;grab bar, tub/shower   Fall history within last six months no   Activity/Exercise/Self-Care Comment Reports IND with ADLs and most IADLs. Reports  comes 1x/week to clean. Pt reports usually coooking, but has option to order meals from facility if needed.   General Information   Onset of Illness/Injury or Date of Surgery 02/21/23   Referring Physician Katelyn Vasquez MD   Additional Occupational Profile Info/Pertinent History of Current Problem Erich Craven is a 82 year old male s/p left rTSA on 2/21 with Dr. Vasquez.   Existing Precautions/Restrictions shoulder;weight bearing   Left Upper Extremity (Weight-bearing Status) non weight-bearing (NWB)   Right Upper Extremity (Weight-bearing Status) full weight-bearing (FWB)   Left Lower Extremity (Weight-bearing Status) full weight-bearing (FWB)   Right Lower Extremity (Weight-bearing Status) full weight-bearing (FWB)   Cognitive Status Examination   Orientation Status orientation to person, place and time   Cognitive Status Comments Pt engaged in appropriate conversation t/o and followed all commands, but demonstrated higher level problem solving deficits t/o session. Will continue to monitor.   Sensory   Sensory Comments Reports some numbness in L  5th digit since surgery   Pain Assessment   Patient Currently in Pain No   Range of Motion Comprehensive   Comment, General Range of Motion Impaired external/internal rotation with RUE, all other planes of ROM WFL with RUE   Bed Mobility   Comment (Bed Mobility) Mod A supine to sit   Transfers   Transfer Comments CGA STS from EOB   Activities of Daily Living   BADL Assessment/Intervention bathing;upper body dressing;lower body dressing;grooming;toileting   Bathing Assessment/Intervention   Comment, (Bathing) Per clinical judgement, Min A   Upper Body Dressing Assessment/Training   Comment, (Upper Body Dressing) Mod A to don sling   Lower Body Dressing Assessment/Training   Comment, (Lower Body Dressing) Min A to don underwear   Grooming Assessment/Training   Comment, (Grooming) SBA standing at sink to perform hand hygiene   Toileting   Comment, (Toileting) Per clinical judgement, CGA STS from toilet   Clinical Impression   Criteria for Skilled Therapeutic Interventions Met (OT) Yes, treatment indicated   OT Diagnosis Decline in ADL performance d/t post-surgical precautions   Influenced by the following impairments Post-surgical precautions, pain, decreased strength, impaired ROM   OT Problem List-Impairments impacting ADL problems related to;activity tolerance impaired;cognition;mobility;range of motion (ROM);sensation;strength;pain;post-surgical precautions   Assessment of Occupational Performance 3-5 Performance Deficits   Identified Performance Deficits Dressing, showering, grooming, toileting, meal prep   Planned Therapy Interventions (OT) ADL retraining;bed mobility training;orthoic fitting/training;ROM;transfer training;home program guidelines;progressive activity/exercise;risk factor education   Clinical Decision Making Complexity (OT) low complexity   Anticipated Equipment Needs Upon Discharge (OT) toileting equipment;dressing equipment   Risk & Benefits of therapy have been explained evaluation/treatment  results reviewed;care plan/treatment goals reviewed;risks/benefits reviewed;current/potential barriers reviewed;participants voiced agreement with care plan;participants included;patient   OT Total Evaluation Time   OT Eval, Low Complexity Minutes (04993) 7   OT Goals   Therapy Frequency (OT) Daily   OT Predicted Duration/Target Date for Goal Attainment 03/01/23   OT Goals Hygiene/Grooming;Upper Body Dressing;Lower Body Dressing;Upper Body Bathing;Lower Body Bathing;Bed Mobility;Toilet Transfer/Toileting;OT Goal 1   OT: Hygiene/Grooming modified independent   OT: Upper Body Dressing Modified independent;using adaptive equipment;within precautions;including orthotic   OT: Lower Body Dressing Modified independent   OT: Upper Body Bathing Modified independent   OT: Lower Body Bathing Modified independent   OT: Bed Mobility Modified independent;within precautions   OT: Toilet Transfer/Toileting Modified independent   OT: Goal 1 Pt will IND perform LUE HEP to prevent contractures and maintain LUE function within precautions.   Self-Care/Home Management   Self-Care/Home Mgmt/ADL, Compensatory, Meal Prep Minutes (05825) 48   Symptoms Noted During/After Treatment (Meal Preparation/Planning Training) fatigue;increased pain   Treatment Detail/Skilled Intervention OT: Upon completion of eval, treatment indicated. Pt demonstrated increased difficulty with bed mobility attempting to problem solve sitting on EOB to R or L with pt unable to perform either without physical assist. Facilitated supine to sit transfer with Mod A to L with HOB elevated and RUE on bed rail. Educated pt on donning/doffing sling with pt able to perform with Mod A d/t impaired R shoulder ROM and mod verbal cues for problem solving. Educated pt on utilizing reacher for donning/doffing sling, with pt requesting issued reacher. Educated pt on donning/doffing pull over shirt utilizing one handed dressing techniques with pt able to perform with Min A d/t  "impaired RUE internal/external rotation and unable to pull shirt down back. Engaged in LB dressing donning underwear and pants with SBA to thread BLE and Min A to pull over hips on L side d/t impaired RUE ROM. Pt reporting unable to perform jania cares with LUE in sling and RUE ROM impaired. Educated pt on toileting options such as bidet or toilet tongs. Simulated toilet tong use with pt able to perform with SBA. Pt requested issued toilet tongs. Facilitated functional mobility to bathroom with CGA and cane and initially shuffled gait. Engaged in oral cares while standing at sink with SBA. Pt reported no increased assistance available at home and stated \"I want to stay here for a few more days.\" Pt returned to seated in recliner with all needs within reach and RN in room.   Therapeutic Procedures/Exercise   Therapeutic Procedure: strength, endurance, ROM, flexibillity minutes (20753) 20   Symptoms Noted During/After Treatment fatigue   Treatment Detail/Skilled Intervention OT: Engaged in LUE HEP to prevent contractures and maintain LUE function within precautions. Provided extensive education on Codman's as pt initially having difficulty demonstrating. Pt able to perform 10 reps of Codman's in all planes with SBA and unilateral UE support on bed rail. Performed 10 reps of elbow flex/ext (AROM and PROM d/t continued numbness), supination/pronation, wrist flex/ext, ulnar/radial deviation, and digit flex/ext. Provided handout and educated pt on performing 2-3x/day as able with sling doffed.   OT Discharge Planning   OT Plan Progress bed mobility, trial donning/doffing sling with reacher (already issued to pt), toileting with toilet tongs (issued to pt), review LUE HEP as needed, monitor cog   OT Discharge Recommendation (DC Rec) home with assist;home with home care occupational therapy;Transitional Care Facility   OT Rationale for DC Rec Pt performing below functional baseline primarily limited by post-surgical " precautions but also limited by impaired ROM in RUE and decreased endurance. Pt currently performing bed mobility with Mod A, functional mobility with CGA and cane, and ADLs with Min-Mod A. Recommend return to IND living facility at d/c with increased assistance from staff/family for ADLs and HHOT to maximize return to PLOF. If increased assist is not available, pt may benefit from TCU to maximize ADL performance.   OT Brief overview of current status CGA with cane in room   Total Session Time   Timed Code Treatment Minutes 68   Total Session Time (sum of timed and untimed services) 75                                                                                   [facility]      OUTPATIENT OCCUPATIONAL THERAPY  EVALUATION  PLAN OF TREATMENT FOR OUTPATIENT REHABILITATION  (COMPLETE FOR INITIAL CLAIMS ONLY)  Patient's Last Name, First Name, M.I.  YOB: 1940  Erich Craven                          Provider's Name  [facility] Medical Record No.  [id number]                               Onset Date:  02/21/23   Start of Care Date:  02/22/23     Type:     ___PT   _X_OT   ___SLP Medical Diagnosis:  left rTSA                        OT Diagnosis:  Decline in ADL performance d/t post-surgical precautions   Visits from SOC:  1   _________________________________________________________________________________  Plan of Treatment/Functional Goals    Planned Interventions: ADL retraining, bed mobility training, orthoic fitting/training, ROM, transfer training, home program guidelines, progressive activity/exercise, risk factor education   Goals: See Occupational Therapy Goals on Care Plan in [facility] electronic health record.    Therapy Frequency: Daily  Predicted Duration of Therapy Intervention: 03/01/23  _________________________________________________________________________________    I CERTIFY THE NEED FOR THESE SERVICES FURNISHED UNDER         THIS PLAN OF TREATMENT AND WHILE UNDER MY CARE     (Physician co-signature of this document indicates review and certification of the therapy plan).                Certification date from: 02/22/23, Certification date to: 03/01/23    Referring Physician: Katelyn Vasquez MD            Initial Assessment        See Occupational Therapy evaluation dated 02/22/23 in Epic electronic health record.

## 2023-02-23 ENCOUNTER — APPOINTMENT (OUTPATIENT)
Dept: OCCUPATIONAL THERAPY | Facility: CLINIC | Age: 83
End: 2023-02-23
Attending: ORTHOPAEDIC SURGERY
Payer: MEDICARE

## 2023-02-23 LAB
GLUCOSE BLDC GLUCOMTR-MCNC: 114 MG/DL (ref 70–99)
HGB BLD-MCNC: 11.1 G/DL (ref 13.3–17.7)
HOLD SPECIMEN: NORMAL
INR PPP: 1.11 (ref 0.85–1.15)
LACTATE SERPL-SCNC: 0.8 MMOL/L (ref 0.7–2)
MDC_IDC_EPISODE_DTM: NORMAL
MDC_IDC_EPISODE_DURATION: 10 S
MDC_IDC_EPISODE_DURATION: 11 S
MDC_IDC_EPISODE_DURATION: 11 S
MDC_IDC_EPISODE_DURATION: 14 S
MDC_IDC_EPISODE_DURATION: 16 S
MDC_IDC_EPISODE_DURATION: 5 S
MDC_IDC_EPISODE_DURATION: 6 S
MDC_IDC_EPISODE_DURATION: 7 S
MDC_IDC_EPISODE_ID: NORMAL
MDC_IDC_EPISODE_TYPE: NORMAL
MDC_IDC_LEAD_IMPLANT_DT: NORMAL
MDC_IDC_LEAD_IMPLANT_DT: NORMAL
MDC_IDC_LEAD_LOCATION: NORMAL
MDC_IDC_LEAD_LOCATION: NORMAL
MDC_IDC_LEAD_LOCATION_DETAIL_1: NORMAL
MDC_IDC_LEAD_LOCATION_DETAIL_1: NORMAL
MDC_IDC_LEAD_MFG: NORMAL
MDC_IDC_LEAD_MFG: NORMAL
MDC_IDC_LEAD_MODEL: NORMAL
MDC_IDC_LEAD_MODEL: NORMAL
MDC_IDC_LEAD_POLARITY_TYPE: NORMAL
MDC_IDC_LEAD_POLARITY_TYPE: NORMAL
MDC_IDC_LEAD_SERIAL: NORMAL
MDC_IDC_LEAD_SERIAL: NORMAL
MDC_IDC_MSMT_BATTERY_DTM: NORMAL
MDC_IDC_MSMT_BATTERY_REMAINING_LONGEVITY: 12 MO
MDC_IDC_MSMT_BATTERY_REMAINING_PERCENTAGE: 24 %
MDC_IDC_MSMT_BATTERY_STATUS: NORMAL
MDC_IDC_MSMT_LEADCHNL_RA_IMPEDANCE_VALUE: 454 OHM
MDC_IDC_MSMT_LEADCHNL_RA_PACING_THRESHOLD_AMPLITUDE: 0.5 V
MDC_IDC_MSMT_LEADCHNL_RA_PACING_THRESHOLD_PULSEWIDTH: 0.4 MS
MDC_IDC_MSMT_LEADCHNL_RV_IMPEDANCE_VALUE: 384 OHM
MDC_IDC_PG_IMPLANT_DTM: NORMAL
MDC_IDC_PG_MFG: NORMAL
MDC_IDC_PG_MODEL: NORMAL
MDC_IDC_PG_SERIAL: NORMAL
MDC_IDC_PG_TYPE: NORMAL
MDC_IDC_SESS_CLINIC_NAME: NORMAL
MDC_IDC_SESS_DTM: NORMAL
MDC_IDC_SESS_TYPE: NORMAL
MDC_IDC_SET_BRADY_AT_MODE_SWITCH_MODE: NORMAL
MDC_IDC_SET_BRADY_AT_MODE_SWITCH_RATE: 150 {BEATS}/MIN
MDC_IDC_SET_BRADY_LOWRATE: 60 {BEATS}/MIN
MDC_IDC_SET_BRADY_MAX_SENSOR_RATE: 130 {BEATS}/MIN
MDC_IDC_SET_BRADY_MAX_TRACKING_RATE: 130 {BEATS}/MIN
MDC_IDC_SET_BRADY_MODE: NORMAL
MDC_IDC_SET_BRADY_PAV_DELAY_HIGH: 220 MS
MDC_IDC_SET_BRADY_PAV_DELAY_LOW: 250 MS
MDC_IDC_SET_BRADY_SAV_DELAY_HIGH: 220 MS
MDC_IDC_SET_BRADY_SAV_DELAY_LOW: 250 MS
MDC_IDC_SET_LEADCHNL_RA_PACING_AMPLITUDE: 2 V
MDC_IDC_SET_LEADCHNL_RA_PACING_POLARITY: NORMAL
MDC_IDC_SET_LEADCHNL_RA_PACING_PULSEWIDTH: 0.4 MS
MDC_IDC_SET_LEADCHNL_RA_SENSING_ADAPTATION_MODE: NORMAL
MDC_IDC_SET_LEADCHNL_RA_SENSING_POLARITY: NORMAL
MDC_IDC_SET_LEADCHNL_RA_SENSING_SENSITIVITY: 0.5 MV
MDC_IDC_SET_LEADCHNL_RV_PACING_AMPLITUDE: 3.2 V
MDC_IDC_SET_LEADCHNL_RV_PACING_CAPTURE_MODE: NORMAL
MDC_IDC_SET_LEADCHNL_RV_PACING_POLARITY: NORMAL
MDC_IDC_SET_LEADCHNL_RV_PACING_PULSEWIDTH: 0.4 MS
MDC_IDC_SET_LEADCHNL_RV_SENSING_ADAPTATION_MODE: NORMAL
MDC_IDC_SET_LEADCHNL_RV_SENSING_POLARITY: NORMAL
MDC_IDC_SET_LEADCHNL_RV_SENSING_SENSITIVITY: 1 MV
MDC_IDC_SET_ZONE_DETECTION_INTERVAL: 375 MS
MDC_IDC_SET_ZONE_TYPE: NORMAL
MDC_IDC_SET_ZONE_VENDOR_TYPE: NORMAL
MDC_IDC_STAT_AT_BURDEN_PERCENT: 1 %
MDC_IDC_STAT_AT_DTM_END: NORMAL
MDC_IDC_STAT_AT_DTM_START: NORMAL
MDC_IDC_STAT_BRADY_DTM_END: NORMAL
MDC_IDC_STAT_BRADY_DTM_START: NORMAL
MDC_IDC_STAT_BRADY_RA_PERCENT_PACED: 6 %
MDC_IDC_STAT_BRADY_RV_PERCENT_PACED: 0 %
MDC_IDC_STAT_EPISODE_RECENT_COUNT: 0
MDC_IDC_STAT_EPISODE_RECENT_COUNT: 0
MDC_IDC_STAT_EPISODE_RECENT_COUNT: 10
MDC_IDC_STAT_EPISODE_RECENT_COUNT: 18
MDC_IDC_STAT_EPISODE_RECENT_COUNT_DTM_END: NORMAL
MDC_IDC_STAT_EPISODE_RECENT_COUNT_DTM_START: NORMAL
MDC_IDC_STAT_EPISODE_TYPE: NORMAL
MDC_IDC_STAT_EPISODE_VENDOR_TYPE: NORMAL

## 2023-02-23 PROCEDURE — 85610 PROTHROMBIN TIME: CPT | Performed by: INTERNAL MEDICINE

## 2023-02-23 PROCEDURE — 250N000013 HC RX MED GY IP 250 OP 250 PS 637: Performed by: INTERNAL MEDICINE

## 2023-02-23 PROCEDURE — 250N000011 HC RX IP 250 OP 636: Performed by: ORTHOPAEDIC SURGERY

## 2023-02-23 PROCEDURE — 36415 COLL VENOUS BLD VENIPUNCTURE: CPT | Performed by: STUDENT IN AN ORGANIZED HEALTH CARE EDUCATION/TRAINING PROGRAM

## 2023-02-23 PROCEDURE — 83605 ASSAY OF LACTIC ACID: CPT | Performed by: STUDENT IN AN ORGANIZED HEALTH CARE EDUCATION/TRAINING PROGRAM

## 2023-02-23 PROCEDURE — 97530 THERAPEUTIC ACTIVITIES: CPT | Mod: GO

## 2023-02-23 PROCEDURE — 82962 GLUCOSE BLOOD TEST: CPT

## 2023-02-23 PROCEDURE — 250N000013 HC RX MED GY IP 250 OP 250 PS 637: Performed by: ORTHOPAEDIC SURGERY

## 2023-02-23 PROCEDURE — 85018 HEMOGLOBIN: CPT | Performed by: ORTHOPAEDIC SURGERY

## 2023-02-23 PROCEDURE — 99232 SBSQ HOSP IP/OBS MODERATE 35: CPT | Performed by: INTERNAL MEDICINE

## 2023-02-23 RX ORDER — FUROSEMIDE 40 MG
40 TABLET ORAL EVERY MORNING
Status: DISCONTINUED | OUTPATIENT
Start: 2023-02-23 | End: 2023-02-24 | Stop reason: HOSPADM

## 2023-02-23 RX ORDER — FUROSEMIDE 20 MG
20 TABLET ORAL EVERY EVENING
Status: DISCONTINUED | OUTPATIENT
Start: 2023-02-23 | End: 2023-02-24 | Stop reason: HOSPADM

## 2023-02-23 RX ORDER — POTASSIUM CHLORIDE 750 MG/1
10 TABLET, EXTENDED RELEASE ORAL DAILY
Status: DISCONTINUED | OUTPATIENT
Start: 2023-02-23 | End: 2023-02-24 | Stop reason: HOSPADM

## 2023-02-23 RX ORDER — WARFARIN SODIUM 10 MG/1
10 TABLET ORAL
Status: COMPLETED | OUTPATIENT
Start: 2023-02-23 | End: 2023-02-23

## 2023-02-23 RX ADMIN — SENNOSIDES AND DOCUSATE SODIUM 1 TABLET: 50; 8.6 TABLET ORAL at 09:21

## 2023-02-23 RX ADMIN — METOLAZONE 2.5 MG: 2.5 TABLET ORAL at 15:55

## 2023-02-23 RX ADMIN — WARFARIN SODIUM 10 MG: 10 TABLET ORAL at 17:39

## 2023-02-23 RX ADMIN — POLYETHYLENE GLYCOL 3350 17 G: 17 POWDER, FOR SOLUTION ORAL at 09:17

## 2023-02-23 RX ADMIN — ALBUTEROL SULFATE 2 PUFF: 90 AEROSOL, METERED RESPIRATORY (INHALATION) at 11:58

## 2023-02-23 RX ADMIN — ACETAMINOPHEN 975 MG: 325 TABLET ORAL at 04:26

## 2023-02-23 RX ADMIN — FUROSEMIDE 20 MG: 20 TABLET ORAL at 20:12

## 2023-02-23 RX ADMIN — ONDANSETRON 4 MG: 4 TABLET, ORALLY DISINTEGRATING ORAL at 16:00

## 2023-02-23 RX ADMIN — VENLAFAXINE 75 MG: 75 TABLET ORAL at 09:22

## 2023-02-23 RX ADMIN — ATORVASTATIN CALCIUM 40 MG: 40 TABLET, FILM COATED ORAL at 20:12

## 2023-02-23 RX ADMIN — ALCOHOL 1 TABLET: 70.47 GEL TOPICAL at 09:22

## 2023-02-23 RX ADMIN — OXYCODONE HYDROCHLORIDE 10 MG: 10 TABLET ORAL at 01:48

## 2023-02-23 RX ADMIN — METOLAZONE 2.5 MG: 2.5 TABLET ORAL at 09:21

## 2023-02-23 RX ADMIN — UMECLIDINIUM 1 PUFF: 62.5 AEROSOL, POWDER ORAL at 09:04

## 2023-02-23 RX ADMIN — ASPIRIN 81 MG: 81 TABLET, CHEWABLE ORAL at 09:21

## 2023-02-23 RX ADMIN — ACETAMINOPHEN 975 MG: 325 TABLET ORAL at 20:14

## 2023-02-23 RX ADMIN — OXYCODONE HYDROCHLORIDE 10 MG: 10 TABLET ORAL at 09:21

## 2023-02-23 RX ADMIN — VENLAFAXINE 75 MG: 75 TABLET ORAL at 20:12

## 2023-02-23 RX ADMIN — OXYCODONE HYDROCHLORIDE 10 MG: 10 TABLET ORAL at 15:55

## 2023-02-23 RX ADMIN — FLUTICASONE FUROATE AND VILANTEROL TRIFENATATE 1 PUFF: 200; 25 POWDER RESPIRATORY (INHALATION) at 09:04

## 2023-02-23 RX ADMIN — TAMSULOSIN HYDROCHLORIDE 0.4 MG: 0.4 CAPSULE ORAL at 09:22

## 2023-02-23 RX ADMIN — FUROSEMIDE 40 MG: 40 TABLET ORAL at 09:21

## 2023-02-23 RX ADMIN — ACETAMINOPHEN 975 MG: 325 TABLET ORAL at 14:26

## 2023-02-23 RX ADMIN — LEVOTHYROXINE SODIUM 175 MCG: 125 TABLET ORAL at 09:22

## 2023-02-23 RX ADMIN — DILTIAZEM HYDROCHLORIDE 300 MG: 300 CAPSULE, COATED, EXTENDED RELEASE ORAL at 09:22

## 2023-02-23 RX ADMIN — LISINOPRIL 10 MG: 5 TABLET ORAL at 09:21

## 2023-02-23 RX ADMIN — SENNOSIDES AND DOCUSATE SODIUM 1 TABLET: 50; 8.6 TABLET ORAL at 20:12

## 2023-02-23 RX ADMIN — ISOSORBIDE MONONITRATE 30 MG: 30 TABLET, EXTENDED RELEASE ORAL at 09:21

## 2023-02-23 RX ADMIN — POTASSIUM CHLORIDE 10 MEQ: 750 TABLET, EXTENDED RELEASE ORAL at 09:22

## 2023-02-23 RX ADMIN — OXYCODONE HYDROCHLORIDE 10 MG: 10 TABLET ORAL at 20:15

## 2023-02-23 ASSESSMENT — ACTIVITIES OF DAILY LIVING (ADL)
ADLS_ACUITY_SCORE: 31
ADLS_ACUITY_SCORE: 35
ADLS_ACUITY_SCORE: 30
ADLS_ACUITY_SCORE: 35
ADLS_ACUITY_SCORE: 31
ADLS_ACUITY_SCORE: 35
ADLS_ACUITY_SCORE: 31
ADLS_ACUITY_SCORE: 31
ADLS_ACUITY_SCORE: 35

## 2023-02-23 NOTE — PROGRESS NOTES
Care Management Follow Up    Length of Stay (days): 1    Expected Discharge Date: 2/25/2023  Patient plan of care discussed at interdisciplinary rounds: Yes    Anticipated Discharge Disposition: [facility]     Patient/family educated on Medicare website which has current facility and service quality ratings:  yes  Education Provided on the Discharge Plan:  yes  Patient/Family in Agreement with the Plan:  yes    Referrals Placed by CM/SW:  [facility]  Private pay costs discussed: Not applicable    Additional Information:  Spoke with patient via phone to discuss discharge options. Patient has been to [facility] in the past and would like to return there for rehab. Referral sent. [facility] has a bed and has preliminarily accepted the patient. Admissions is making a final review of the patient's chart.   Plan to discharge patient to [facility] on 2/24/2023. Patient will need  transportation set up at discharge. [facility] will continue to follow.    Update 1306: Patient has been accepted to [facility]. Wheelchair transport setup for 2/24 at noon through  [facility].   Patient has his own Bipap.   Orders completed.       Kassi James RN, BSN  Care Coordinator,  Ortho  Phone [phone]  Pager [phone]

## 2023-02-23 NOTE — PLAN OF CARE
Goal Outcome Evaluation:      Plan of Care Reviewed With: patient    Overall Patient Progress: improving     VS: VSS. Denies CP. A/O x4.   O2: >90% on 1 LPM, unable to wean off. Dr. Phillip said ok to keep patient >88%. IS encouraged. H/o COPD, uses BIPAP at night. On cont pulse ox, did have one episode this morning where pt felt SOB, was wheezing, PRN albuterol inhaler and scheduled inhalers given- improved. Pt sat upright in chair most of today as this helped his breathing   Output: Voiding adequate amounts w/o pain or difficulty   Last BM: 2/21   Activity: Up with 1 assist and GB    Skin: L shoulder surgical incision, Scattered bruising    Pain: Pain in L shoulder, managing with PRN oxycodone. Scheduled tylenol. Ice Packs.    CMS: Intact    Dressing: CDI    Diet: Regular, tolerating well.    LDA: PIV SL   Equipment: IV pole, shoulder immobilizer, personal belongings on bedside table (dentures).    Plan: Anticipated discharge tomorrow to [facility] at 1200   Additional Info:

## 2023-02-23 NOTE — PROGRESS NOTES
Orthopaedic Surgery Progress Note 02/23/2023    Subjective  No acute events overnight.  Pain controlled. Denies new numbness, tingling, or weakness.  Tolerating diet without nausea or vomiting.  Voiding spontaneously.  +flatus, -BM.   Denies fevers, chills, chest pain, SOB.      Objective  Temp: (!) 96.1  F (35.6  C) Temp src: Oral BP: (!) 147/72 Pulse: 74   Resp: 16 SpO2: 91 % O2 Device: Nasal cannula Oxygen Delivery: 2 LPM    Exam:  Gen: No acute distress, resting comfortably in bed.  Resp: Non-labored breathing  Cardio: Regular rate via peripheral pulse  MSK:  UE:  - Dressings c/d/I  - Fires EPL, FPL, Intrinsics  - SILT medial/radial/ulnar/axillary nerves  - Radial pulse 2+, hand wwp    Assessment: Erich Craven is a 82 year old male s/p left rTSA on 2/21 with Dr. Vasquez. Doing well.    Plan:  Ortho Primary  Activity: Up with assist.  Weight bearing status: NWB LUE  Antibiotics: Ancef x24 hours perioperatively.  Diet: Begin with clear fluids and progress diet as tolerated.  DVT prophylaxis: ASA, SCD  Bracing/Splinting: Ultrasling at all times, to be kept clean, dry, and intact until follow-up.  Wound Care: Aquacel, keep CDI  Pain management: transition from IV to orals as tolerated.   X-rays: completed  Physical Therapy:  ROM, ADL's.  Occupational Therapy: ADL's.  Labs: Trend Hgb on POD #1.  Consults: PT, OT. Hospitalist, appreciate assistance in caring for this patient.  Follow-up: Clinic with Dr. Vasquez in 2 weeks    Disposition: Pending progress with therapies, pain control on orals, and medical stability, anticipate discharge to home vs TCU on POD #1-2.    Gio Blankenship MD  Orthopaedic Surgery PGY-1     Future Appointments   Date Time Provider Department [place]   2/22/2023  8:15 AM Sol Garcia, OTR UROT [place]   2/28/2023  9:45 AM CS LAB [facility]   3/8/2023  9:00 AM Katelyn Vasquez MD [facility]   4/5/2023  9:00 AM Katelyn Vasquez MD UOR [facility]   5/23/2023 12:00 AM SANDOVAL TECH1  [facility] PSA CLIN

## 2023-02-23 NOTE — PLAN OF CARE
Goal Outcome Evaluation:      Plan of Care Reviewed With: patient       VS: BP (!) 141/76 (BP Location: Right arm, Patient Position: Semi-Mondragon's, Cuff Size: Adult Regular)   Pulse 80   Temp 97  F (36.1  C) (Oral)   Resp 17   Ht 1.829 m (6')   Wt 116.2 kg (256 lb 2.8 oz)   SpO2 92%   BMI 34.74 kg/m       O2: SpO2 > 92%%  On 1-2 LPM. LS clear and equal bilaterally. Denies chest pain and SOB. Hx COPD, on CPAP   Output: Voiding adequate amounts w/o pain or difficulty   Last BM: 2/20/23, denies abdominal discomfort. BS active     Activity: Up with A 1 and GB. Did not get OOB for noc    Skin: WDL except, L shoulder surgical incision, Scattered bruising , legs discolored   Pain: Pain managed with scheduled tylenol, prn oxy, ice packs   CMS: Intact, AOx4. Denies numbness and tingling.   Dressing: Lt shoulder, CDI   Diet: Regular diet. Denies nausea/vomiting.    LDA: 2 Lt PIV SL   Equipment: IV pole, personal belongings, sling, dentures     Plan: Continue with plan of care. Call light within reach, pt able to make needs known.    Additional Info: Triggered sepsis, VSS, Lactic 0.8

## 2023-02-23 NOTE — PROGRESS NOTES
[facility]    Medicine Progress Note - Hospitalist Service, GOLD TEAM 17    Date of Admission:  2/21/2023    Assessment & Plan    Erich Craven is a delightful 82-year-old  male admitted on 2/21/2023.  Patient underwent reverse left shoulder arthroplasty on 2/21/2023 with Dr. Katelyn Vasquez.  Patient has a fairly extensive chronic past medical history..  Patient has a history of heart failure with preserved ejection fraction.  Patient utilizes BiPAP at night at home.  Internal medicine was consulted for co-medical management.     #Status post left reverse shoulder arthroplasty  - Orthopedic surgery primary service  - Coumadin dosing per pharmacy for DVT prophylaxis ( H/O DVT in the past)  - As needed pain control  - Physical, occupational therapy evaluations   -- Post op Hgb at 11.1, reflecting acute blood loss anemia. No transfusion necessary       #Heart failure with preserved ejection fraction  #Coronary artery disease  #Atrial fibrillation on chronic anticoagulation with Coumadin  #Status post pacemaker placement  #Hypertension  #Hyperlipidemia  - Patient appears rate controlled at present  - Continue diltiazem ER  - Continue aspirin, statin therapies  - Resume  PTA lisinopril  - Resume  PTA metolazone  - Resume  PTA spironolactone and lasix ( 40 mg am and 20 mg pm) with potassium supplements   BMP within acceptable limits      #History alcohol use disorder  - Encourage continued cessation     #BPH  - Continue PTA Flomax     #History of DVT and pulmonary embolism in 2012  -Patient reports this after right knee arthroplasty in 2012  - Patient is chronically anticoagulated with Coumadin  - Coumadin dosing per pharmacy. INR at 1.11 today ( 2/23)     #COPD  - Continue PTA inhaler regimen  - O2 protocol  - Respiratory therapy evaluation for BiPAP settings- They recommend bringing in home equipment     #Hypothyroidism  - Continue PTA Synthroid          Diet:  Advance Diet as Tolerated: Regular Diet Adult  Diet    DVT Prophylaxis: Warfarin  Reddy Catheter: Not present  Lines: None     Cardiac Monitoring: None  Code Status:  Special code     Clinically Significant Risk Factors Present on Admission               # Drug Induced Coagulation Defect: home medication list includes an anticoagulant medication    # Hypertension: home medication list includes antihypertensive(s)      # Obesity: Estimated body mass index is 34.74 kg/m  as calculated from the following:    Height as of this encounter: 1.829 m (6').    Weight as of this encounter: 116.2 kg (256 lb 2.8 oz).           Disposition Plan     Expected Discharge Date: 02/23/2023                  Kenji Cunningham MD  Hospitalist Service, GOLD TEAM 17  [facility]  Securely message with [email or web address] (more info)  Text page via [employer] Paging/Directory   See signed in provider for up to date coverage information  ______________________________________________________________________    Interval History   Charts reviewed and patient examined.  Patient has bilateral shoulder pain, which he feels is from laying down  Has not been out of bed  Denies chest pain or SOB  No fevers or chills  Eating and drinking well       Physical Exam   Vital Signs: Temp: (!) 96.1  F (35.6  C) Temp src: Oral BP: (!) 147/72 Pulse: 74   Resp: 16 SpO2: 91 % O2 Device: Nasal cannula Oxygen Delivery: 2 LPM  Weight: 256 lbs 2.79 oz    General Appearance: Awake, alert and not in distress  Respiratory: Clear breath sounds bilaterally   Cardiovascular: Normal heart sounds. No murmurs   GI: Soft, non tender. Normal bowel sounds   Skin: No bruising or bleeding   MSK: Left shoulder with intcat dressing. No distal deficits.   Bilateral lower extremity edema and venous stasis changes   Other:Awake, alert and orientated X 3       Medical Decision Making       25 MINUTES SPENT BY ME on the date of service doing chart  review, history, exam, documentation & further activities per the note.  MANAGEMENT DISCUSSED with the following over the past 24 hours: Bedside RN, patient, orthopedic team and care managemnet team        Data   Recent Labs   Lab 02/23/23  0534 02/23/23  0453 02/22/23  0938 02/22/23  0704 02/21/23  1044 02/21/23  0650   WBC  --   --  17.7*  --   --   --    HGB  --  11.1* 12.2*  --   --   --    MCV  --   --  96  --   --   --    PLT  --   --  249  --   --   --    INR  --  1.11 0.98  --   --  1.01   NA  --   --  137  --   --   --    POTASSIUM  --   --  3.7  --   --   --    CHLORIDE  --   --  98  --   --   --    CO2  --   --  28  --   --   --    BUN  --   --  15.1  --   --   --    CR  --   --  0.87  --   --   --    ANIONGAP  --   --  11  --   --   --    LAWSON  --   --  9.5  --   --   --    *  --  123* 148*   < >  --    ALBUMIN  --   --  4.0  --   --   --    PROTTOTAL  --   --  6.8  --   --   --    BILITOTAL  --   --  0.3  --   --   --    ALKPHOS  --   --  98  --   --   --    ALT  --   --  13  --   --   --    AST  --   --  21  --   --   --     < > = values in this interval not displayed.

## 2023-02-23 NOTE — PROGRESS NOTES
RT department currently out of CPAP/BIPAP units. RT department recommends that if this is a therapy that is used at home for the pt to have their home unit brought in for inpatient use.

## 2023-02-23 NOTE — PLAN OF CARE
Goal Outcome Evaluation:  End of shift Summary: See flowsheet for VS and detail assessments.    Changes this Shift:   Pt A&OX4 reported Lside weakness and use to be numbness. Pt has SOB occasionally LS wheezing and dim encourage using spirometer. Pt is on NC 1-2 L. Edema BLE skin bruise in both arm. CMS intact. Pain manage using oxycodone. LBM 02/20/2023 Pt was not oob during the shift. PIV saline locked.

## 2023-02-23 NOTE — UTILIZATION REVIEW
Admission Status; Secondary Review Determination       Under the authority of the Utilization Management Committee, the utilization review process indicated a secondary review on the above patient. The review outcome is based on review of the medical records, discussions with staff, and applying clinical experience noted on the date of the review.     (x) Outpatient Status with extended recovery is appropriate - This patient does not meet hospital inpatient criteria. If this patient's primary payer is Medicare and was admitted as an inpatient, Condition Code 44 should be used and patient status changed to outpatient recovery.    RATIONALE FOR DETERMINATION   Patient is an 82 year old man with multiple medical problems including congestive heart failure, coronary artery disease, COPD and diabetes, hypertension, dyslipidemia, and hypothyroidism.  He underwent reverse left shoulder arthroplasty on 2/21/2023.  Patient was admitted to the hospital after the procedure. Patient has Medicare and the procedure is not on the CMS inpatient list. There are no documented complications or unexpected recovery. Patient is awaiting TCU placement. Patient can be safely monitored for bleeding and recover in outpatient/extended recovery setting.   The severity of illness, intensity of service provided, expected LOS and risk for adverse outcome doesn't meet inpatient hospital admission.     This document was produced using voice recognition software.     The information on this document is developed by the utilization review team in order for the business office to ensure compliance. This only denotes the appropriateness of proper admission status and does not reflect the quality of care rendered.   The definitions of Inpatient Status and Observation Status used in making the determination above are those provided in the CMS Coverage Manual, Chapter 1 and Chapter 6, section 70.4.   Sincerely,     Armand Cardoso MD  Utilization  Review  Physician Advisor  [facility].

## 2023-02-24 ENCOUNTER — TELEPHONE (OUTPATIENT)
Dept: ANTICOAGULATION | Facility: CLINIC | Age: 83
End: 2023-02-24
Payer: MEDICARE

## 2023-02-24 VITALS
BODY MASS INDEX: 34.7 KG/M2 | HEART RATE: 67 BPM | WEIGHT: 256.17 LBS | TEMPERATURE: 96.2 F | HEIGHT: 72 IN | OXYGEN SATURATION: 91 % | DIASTOLIC BLOOD PRESSURE: 56 MMHG | SYSTOLIC BLOOD PRESSURE: 100 MMHG | RESPIRATION RATE: 16 BRPM

## 2023-02-24 DIAGNOSIS — M25.512 CHRONIC LEFT SHOULDER PAIN: ICD-10-CM

## 2023-02-24 DIAGNOSIS — I26.99 PE (PULMONARY THROMBOEMBOLISM) (H): Primary | ICD-10-CM

## 2023-02-24 DIAGNOSIS — I82.4Z9 DEEP VEIN THROMBOSIS (DVT) OF DISTAL VEIN OF LOWER EXTREMITY, UNSPECIFIED CHRONICITY, UNSPECIFIED LATERALITY (H): ICD-10-CM

## 2023-02-24 DIAGNOSIS — I48.0 PAROXYSMAL A-FIB (H): ICD-10-CM

## 2023-02-24 DIAGNOSIS — Z79.01 LONG TERM CURRENT USE OF ANTICOAGULANTS WITH INR GOAL OF 2.0-3.0: ICD-10-CM

## 2023-02-24 DIAGNOSIS — G89.29 CHRONIC LEFT SHOULDER PAIN: ICD-10-CM

## 2023-02-24 LAB
HOLD SPECIMEN: NORMAL
HOLD SPECIMEN: NORMAL
INR PPP: 1.35 (ref 0.85–1.15)

## 2023-02-24 PROCEDURE — 250N000013 HC RX MED GY IP 250 OP 250 PS 637: Performed by: ORTHOPAEDIC SURGERY

## 2023-02-24 PROCEDURE — 85610 PROTHROMBIN TIME: CPT | Performed by: INTERNAL MEDICINE

## 2023-02-24 PROCEDURE — 250N000013 HC RX MED GY IP 250 OP 250 PS 637: Performed by: INTERNAL MEDICINE

## 2023-02-24 PROCEDURE — 36415 COLL VENOUS BLD VENIPUNCTURE: CPT | Performed by: INTERNAL MEDICINE

## 2023-02-24 PROCEDURE — 99232 SBSQ HOSP IP/OBS MODERATE 35: CPT | Performed by: INTERNAL MEDICINE

## 2023-02-24 RX ORDER — LACTULOSE 10 G/10G
20 SOLUTION ORAL 3 TIMES DAILY
Status: DISCONTINUED | OUTPATIENT
Start: 2023-02-24 | End: 2023-02-24

## 2023-02-24 RX ORDER — DILTIAZEM HYDROCHLORIDE 300 MG/1
300 CAPSULE, COATED, EXTENDED RELEASE ORAL DAILY
Qty: 90 CAPSULE | Refills: 3 | DISCHARGE
Start: 2023-02-24 | End: 2024-01-24

## 2023-02-24 RX ORDER — WARFARIN SODIUM 10 MG/1
10 TABLET ORAL
Status: DISCONTINUED | OUTPATIENT
Start: 2023-02-24 | End: 2023-02-24 | Stop reason: HOSPADM

## 2023-02-24 RX ORDER — LACTULOSE 10 G/10G
20 SOLUTION ORAL 2 TIMES DAILY PRN
Qty: 20 PACKET | Refills: 0 | DISCHARGE
Start: 2023-02-24 | End: 2023-02-24

## 2023-02-24 RX ORDER — OXYCODONE HYDROCHLORIDE 5 MG/1
5-10 TABLET ORAL EVERY 4 HOURS PRN
Qty: 26 TABLET | Refills: 0 | Status: SHIPPED | OUTPATIENT
Start: 2023-02-24 | End: 2023-03-14

## 2023-02-24 RX ORDER — WARFARIN SODIUM 5 MG/1
TABLET ORAL
DISCHARGE
Start: 2023-02-24 | End: 2023-05-10

## 2023-02-24 RX ADMIN — ASPIRIN 81 MG: 81 TABLET, CHEWABLE ORAL at 08:54

## 2023-02-24 RX ADMIN — DILTIAZEM HYDROCHLORIDE 300 MG: 300 CAPSULE, COATED, EXTENDED RELEASE ORAL at 08:53

## 2023-02-24 RX ADMIN — UMECLIDINIUM 1 PUFF: 62.5 AEROSOL, POWDER ORAL at 09:05

## 2023-02-24 RX ADMIN — FUROSEMIDE 40 MG: 40 TABLET ORAL at 08:54

## 2023-02-24 RX ADMIN — OXYCODONE HYDROCHLORIDE 10 MG: 10 TABLET ORAL at 04:49

## 2023-02-24 RX ADMIN — ACETAMINOPHEN 975 MG: 325 TABLET ORAL at 03:43

## 2023-02-24 RX ADMIN — LEVOTHYROXINE SODIUM 175 MCG: 125 TABLET ORAL at 08:52

## 2023-02-24 RX ADMIN — METOLAZONE 2.5 MG: 2.5 TABLET ORAL at 08:52

## 2023-02-24 RX ADMIN — ISOSORBIDE MONONITRATE 30 MG: 30 TABLET, EXTENDED RELEASE ORAL at 08:54

## 2023-02-24 RX ADMIN — TAMSULOSIN HYDROCHLORIDE 0.4 MG: 0.4 CAPSULE ORAL at 08:53

## 2023-02-24 RX ADMIN — OXYCODONE HYDROCHLORIDE 10 MG: 10 TABLET ORAL at 08:54

## 2023-02-24 RX ADMIN — POTASSIUM CHLORIDE 10 MEQ: 750 TABLET, EXTENDED RELEASE ORAL at 08:53

## 2023-02-24 RX ADMIN — SENNOSIDES AND DOCUSATE SODIUM 1 TABLET: 50; 8.6 TABLET ORAL at 08:54

## 2023-02-24 RX ADMIN — FLUTICASONE FUROATE AND VILANTEROL TRIFENATATE 1 PUFF: 200; 25 POWDER RESPIRATORY (INHALATION) at 09:06

## 2023-02-24 RX ADMIN — POLYETHYLENE GLYCOL 3350 17 G: 17 POWDER, FOR SOLUTION ORAL at 08:52

## 2023-02-24 RX ADMIN — ALCOHOL 1 TABLET: 70.47 GEL TOPICAL at 08:54

## 2023-02-24 RX ADMIN — SPIRONOLACTONE 25 MG: 25 TABLET ORAL at 08:54

## 2023-02-24 RX ADMIN — VENLAFAXINE 75 MG: 75 TABLET ORAL at 08:54

## 2023-02-24 RX ADMIN — OXYCODONE HYDROCHLORIDE 10 MG: 10 TABLET ORAL at 00:44

## 2023-02-24 ASSESSMENT — ACTIVITIES OF DAILY LIVING (ADL)
ADLS_ACUITY_SCORE: 35
ADLS_ACUITY_SCORE: 35
ADLS_ACUITY_SCORE: 31
ADLS_ACUITY_SCORE: 31
ADLS_ACUITY_SCORE: 35
ADLS_ACUITY_SCORE: 35

## 2023-02-24 NOTE — PROGRESS NOTES
[facility]    Medicine Progress Note - Hospitalist Service, GOLD TEAM 17    Date of Admission:  2/21/2023    Assessment & Plan    Erich Craven is a delightful 82-year-old  male admitted on 2/21/2023.  Patient underwent reverse left shoulder arthroplasty on 2/21/2023 with Dr. Katelyn Vasquez.  Patient has a fairly extensive chronic past medical history..  Patient has a history of heart failure with preserved ejection fraction.  Patient utilizes BiPAP at night at home.  Internal medicine was consulted for co-medical management.     #Status post left reverse shoulder arthroplasty  - Orthopedic surgery primary service  - Coumadin dosing per pharmacy for DVT prophylaxis ( H/O DVT in the past)  - As needed pain control  - Physical, occupational therapy evaluations   -- Post op Hgb at 11.1, reflecting acute blood loss anemia. No transfusion necessary       #Heart failure with preserved ejection fraction  #Coronary artery disease  #Atrial fibrillation on chronic anticoagulation with Coumadin  #Status post pacemaker placement  #Hypertension  #Hyperlipidemia  - Patient appears rate controlled at present  - Continue diltiazem ER  - Continue aspirin, statin therapies  - Resume  PTA lisinopril  - Resume  PTA metolazone  - Resume  PTA spironolactone and lasix ( 40 mg am and 20 mg pm) with potassium supplements   BMP within acceptable limits      #History alcohol use disorder  - Encourage continued cessation     #BPH  - Continue PTA Flomax     #History of DVT and pulmonary embolism in 2012  -Patient reports this after right knee arthroplasty in 2012  - Patient is chronically anticoagulated with Coumadin  - Coumadin dosing per pharmacy. INR at 1.11 ( 2/22)     #COPD  - Continue PTA inhaler regimen  - O2 protocol  - Respiratory therapy evaluation for BiPAP settings- Kelton has his home BiPAP     #Hypothyroidism  - Continue PTA Synthroid          Diet: Advance Diet as  Tolerated: Regular Diet Adult  Diet    DVT Prophylaxis: Warfarin  Reddy Catheter: Not present  Lines: None     Cardiac Monitoring: None  Code Status:  Special code     Clinically Significant Risk Factors Present on Admission               # Drug Induced Coagulation Defect: home medication list includes an anticoagulant medication    # Hypertension: home medication list includes antihypertensive(s)      # Obesity: Estimated body mass index is 34.74 kg/m  as calculated from the following:    Height as of this encounter: 1.829 m (6').    Weight as of this encounter: 116.2 kg (256 lb 2.8 oz).           Disposition Plan      Expected Discharge Date: 02/24/2023,  9:00 AM      Discharge Comments: 2/24:TCU          Kenji Cunningham MD  Hospitalist Service, GOLD TEAM 17  [facility]  Securely message with [employer] (more info)  Text page via [employer] Paging/Directory   See signed in provider for up to date coverage information  ______________________________________________________________________    Interval History   Charts reviewed and patient examined.  No new complaints   Uneventful night  Ready to discharge to TCU today     Physical Exam   Vital Signs: Temp: (!) 96.5  F (35.8  C) Temp src: Oral BP: 93/54 Pulse: 71   Resp: 16 SpO2: 92 % O2 Device: Cool Aerosol Oxygen Delivery: 1.5 LPM  Weight: 256 lbs 2.79 oz    General Appearance: Awake, alert and not in distress  Respiratory: Clear breath sounds bilaterally   Cardiovascular: Normal heart sounds. No murmurs   GI: Soft, non tender. Normal bowel sounds   Skin: No bruising or bleeding   MSK: Left shoulder with intcat dressing. No distal deficits.   Bilateral lower extremity edema and venous stasis changes   Other:Awake, alert and orientated X 3       Medical Decision Making       25 MINUTES SPENT BY ME on the date of service doing chart review, history, exam, documentation & further activities per the note.  MANAGEMENT  DISCUSSED with the following over the past 24 hours: Bedside RN, patient, orthopedic team and care managemnet team        Data   Recent Labs   Lab 02/23/23  0534 02/23/23  0453 02/22/23  0938 02/22/23  0704 02/21/23  1044 02/21/23  0650   WBC  --   --  17.7*  --   --   --    HGB  --  11.1* 12.2*  --   --   --    MCV  --   --  96  --   --   --    PLT  --   --  249  --   --   --    INR  --  1.11 0.98  --   --  1.01   NA  --   --  137  --   --   --    POTASSIUM  --   --  3.7  --   --   --    CHLORIDE  --   --  98  --   --   --    CO2  --   --  28  --   --   --    BUN  --   --  15.1  --   --   --    CR  --   --  0.87  --   --   --    ANIONGAP  --   --  11  --   --   --    LAWSON  --   --  9.5  --   --   --    *  --  123* 148*   < >  --    ALBUMIN  --   --  4.0  --   --   --    PROTTOTAL  --   --  6.8  --   --   --    BILITOTAL  --   --  0.3  --   --   --    ALKPHOS  --   --  98  --   --   --    ALT  --   --  13  --   --   --    AST  --   --  21  --   --   --     < > = values in this interval not displayed.

## 2023-02-24 NOTE — TELEPHONE ENCOUNTER
ANTICOAGULATION  MANAGEMENT: Discharge Review    Erich Craven chart reviewed for anticoagulation continuity of care    Hospital Admission on 2/21 for left shoulder arthroplasty.    Discharge disposition: Home    Results:    Recent labs: (last 7 days)     02/21/23  0650 02/22/23  0938 02/23/23  0453 02/24/23  0852   INR 1.01 0.98 1.11 1.35*     Anticoagulation inpatient management:     see calendar    Anticoagulation discharge instructions:     Warfarin dosing: home regimen continued   Bridging: No   INR goal change: No       Medication changes affecting anticoagulation: No    Additional factors affecting anticoagulation: Yes: Possibly- inflammatory reaction from surgery/swelling     PLAN     No adjustment to anticoagulation plan needed As discussed with pt prior, INR is scheduled on Tues 2/28    Patient not contacted    Anticoagulation Calendar updated    Sol Gracia RN

## 2023-02-24 NOTE — PLAN OF CARE
VS: /58 (BP Location: Right arm)   Pulse 83   Temp (!) 96.5  F (35.8  C) (Oral)   Resp 16   Ht 1.829 m (6')   Wt 116.2 kg (256 lb 2.8 oz)   SpO2 93%   BMI 34.74 kg/m     O2: O2>90% on 1.5L nasal cannula, uses BiPAP at night   Reportedly felt SOB this morning with activity, denied feeling lightheaded/SOB this shift during transfers  Crackles heard on breath sounds   On continuous pulse ox    Output: Voids spontaneously in bedside urinal or bathroom    Last BM: 2/21, denies abdominal pain/discomfort   Activity: Ax1-SBA w/ GB - no SOB reported this shift    Up for meals? Sits up for meals   Skin: L shoulder surgical incision, scattered bruising   Leg discoloration   +2 edema to BLE,+1 pulses    Pain: Pain reported to L shoulder, managed w/ prn oxycodone and scheduled tylenol    CMS: AOx4, denies numbness and tingling    Dressing: L shoulder dressing CDI    Diet: Reg, denies nausea.vomiting    LDA: PIV SL    Plan: Anticipated discharge tomorrow (2/24) to [facility] at 1200  Has call light within reach, is able to make needs known.    Additional Info:

## 2023-02-24 NOTE — PROGRESS NOTES
Care coordination team received call from Sheridan aguilera/ Senior Skip Martinez.  She stated the PAS returned a result of being unable to determine level of care required for the pt.  She inquired about pt's functional ability/self-preservation.  This writer utilized latest OT note and nursing notes to provide relevant info.  Sheridan updated the PAS and stated that the pt meets requirements for SNF level of care, she stated she will forward the updated info to [facility].    Sheridan: [phone], ext. [phone]        Dominic Joshi, MARIA DE JESUSCC

## 2023-02-24 NOTE — PROGRESS NOTES
Orthopaedic Surgery Progress Note 02/24/2023    Subjective  No acute events overnight.  Pain controlled. Denies new numbness, tingling, or weakness.  Tolerating diet without nausea or vomiting.  Voiding spontaneously.  +flatus, -BM.   Denies fevers, chills, chest pain, SOB.      Objective  Temp: (!) 96.5  F (35.8  C) Temp src: Oral BP: 93/54 Pulse: 71   Resp: 16 SpO2: 92 % O2 Device: Cool Aerosol Oxygen Delivery: 1.5 LPM    Exam:  Gen: No acute distress, resting comfortably in bed.  Resp: Non-labored breathing  Cardio: Regular rate via peripheral pulse  MSK:  UE:  - Dressings c/d/I  - Fires EPL, FPL, Intrinsics  - SILT medial/radial/ulnar/axillary nerves  - Radial pulse 2+, hand wwp    Assessment: Erich Craven is a 82 year old male s/p left rTSA on 2/21 with Dr. Vasquez. Doing well. Anticipate discharge to TCU today.    Plan:  Ortho Primary  Activity: Up with assist.  Weight bearing status: NWB LUE  Antibiotics: Ancef x24 hours perioperatively.  Diet: Begin with clear fluids and progress diet as tolerated.  DVT prophylaxis: ASA, SCD  Bracing/Splinting: Ultrasling at all times, to be kept clean, dry, and intact until follow-up.  Wound Care: Aquacel, keep CDI  Pain management: transition from IV to orals as tolerated.   X-rays: completed  Physical Therapy:  ROM, ADL's.  Occupational Therapy: ADL's.  Labs: Trend Hgb on POD #1.  Consults: PT, OT. Hospitalist, appreciate assistance in caring for this patient.  Follow-up: Clinic with Dr. Vasquez in 2 weeks    Disposition: Pending progress with therapies, pain control on orals, and medical stability, anticipate discharge to home vs TCU on POD #3-4.    Gio Blankenship MD  Orthopaedic Surgery PGY-1     Future Appointments   Date Time Provider Department [place]   2/22/2023  8:15 AM Sol Garcia, TONIR UROT [place]   2/28/2023  9:45 AM CS LAB [facility]   3/8/2023  9:00 AM Katelyn Vasquez MD [facility]   4/5/2023  9:00 AM Katelyn Vasquez MD [facility]    5/23/2023 12:00 AM [facility]

## 2023-02-24 NOTE — PLAN OF CARE
Occupational Therapy Discharge Summary    Reason for therapy discharge:    Discharged to transitional care facility.    Progress towards therapy goal(s). See goals on Care Plan in [facility] electronic health record for goal details.  Goals partially met.  Barriers to achieving goals:   discharge from facility.    Therapy recommendation(s):    Continued therapy is recommended.  Rationale/Recommendations:  continue OT to maximize return to PLOF within precautions.

## 2023-02-24 NOTE — PLAN OF CARE
VS: VSS. Denies CP. A/O x 4.   O2:  IS encouraged. H/o COPD, uses BIPAP at night, sat. Med 80's and low 90's during day time room air.   Output: Voiding adequate amounts without difficulty.   Last BM: 2/21/23 passing gas.   Activity: Up with 1 assist and gait belt.     Skin: Intact except L shoulder surgical incision, Scattered bruising    Pain: Pain in L shoulder, managing with PRN oxycodone. Scheduled tylenol. Ice Packs.    CMS: Intact    Dressing: CDI    Diet: Regular, tolerating well.    LDA: Dcd.   Equipment: IV pole, shoulder immobilizer, personal belongings.   Plan:  discharge  to [facility] at 1200 pm   Additional Info:            DISCHARGE SUMMARY    Pt discharging to: Rehab   [facility]  AVS given and discussed: yes  Stoplight Tool given and discussed: NA going to rehab.  Medications given: prescription sent with.  Belongings returned: yes.  Comments: pt understand discharge plan.

## 2023-02-24 NOTE — PROGRESS NOTES
A/Ox's 4. Pt rated pain as tolerable. Oxycodone Given for pain control. Dressing CDI. CMS intact. Tolerated regular diet. CPAP with oxygen on overnight. Denied any nausea, CP, SOB, lightheadedness or dizziness. Voiding without pain or difficulty. Passing flatus. Resting in bed at this time with call light in reach. Able to make needs known. Continue to monitor.

## 2023-02-25 ENCOUNTER — TELEPHONE (OUTPATIENT)
Dept: GERIATRICS | Facility: CLINIC | Age: 83
End: 2023-02-25
Payer: MEDICARE

## 2023-02-25 NOTE — TELEPHONE ENCOUNTER
[facility] TELEPHONE ENCOUNTER    Chief Complaint   Patient presents with     INR RESULTS       Erich Craven is a 82 year old  (1940),Nurse called today to report: INR today 1.4. Currently on coumadin for history of PE and DVT per chart review. INR goal 2-3. Coumadin was restarted post surgery on 2/21 which he was given 7.5 on 2/21 and 2/22 and 10mg on 2/23. He received 5mg last night on admission 2/24.     ASSESSMENT/PLAN      Continue coumadin per discharge orders- 7.5mg on tuesdays and saturdays and give 5mg AOD    Recheck INR Monday 2/27    Electronically signed by:   VAMSI Hawkins CNP       PT Re-Evaluation     Today's date: 3/21/2019  Patient name: Maged Barr  : 1953  MRN: [id number]  Referring provider: MARCELLUS Hazel  Dx:   Encounter Diagnosis     ICD-10-CM    1  Left hip pain M25 [id number]                   Assessment  Assessment details: Pt is being treated with outpatient PT  He has  made good gains in rom, strength, pain reduction and functional mobility but continues to have deficits  He will benefit from continued skilled PT to address these deficits and to progress to an independent hep  Thank you for this referral                   Goals  Short Term Goals: Independent performance of initial hep  Decrease pain 2 points on VAS      Long Term Goals: Independent performance of comprehensive hep  Able to walk community distances with min pain  Performance of IADL's is returned to max level of function  Performance in recreational activities is improved to max level of function    Plan  Plan details: 1-2x's per week to continue manual PT to improve rom and progress therex to improve strength with a goal of DC to an independent hep in 2-4 weeks  Frequency: 2x week  Duration in weeks: 4        Subjective Evaluation    History of Present Illness  Mechanism of injury: Pt reports overall improvements in his pain and mobility since starting Pt but that he feels that he continues to have limitations  Reports that he continues to have discomfort when descending stairs and when squatting to the ground  Reports that he has resumed ridding his bike but that he has pain when he tries to get/on and off it  Reports compliance with his current hep  History of R JED      Pain  Current pain ratin  At best pain ratin  At worst pain rating: 3    Patient Goals  Patient goals for therapy: decreased pain, increased motion, increased strength, independence with ADLs/IADLs and return to sport/leisure activities          Objective     ROM:    Flex: 105  Abd: 27  IR: 13  ER: 37      Strength:    Flex: 4+/5  Abd: 4+/5 stiffness  IR: 5/5 pain  ER: 4+/5  Ext: 4/5 pain      Scour: slight crepitus noted  YA: pos for pain  FABIR: pos for pain  Ambulates with antalgic gait pattern but no longer uses and AD  No TTP        Precautions: R JED 2015      Daily Treatment Diary      Manuals 3/21          3/19   Long axis distraction 3'          3'   prom 8'          7'   Consider MWM for IR                             Exercise Diary               Recumbent bike 10'          10'   Hip fallout ER           5"x20   Ball squeeze           NP   bridges           will ball squeeze  5"x20   Prone hip ir/er           NP   HS stretch 30"x4          30"x4   Gluteal stretch 30"x4          30"x4   Standing slr abd/ext 4#x30ea          2 5# 30 ea   S/L Clamshells Black tb x30          supine   green  1x20   Step Ups 10"x20          10"x20    sidestepping Black tb 12ft x10          green tb  12ft x8    squats leg press 40#x30          40# x30    lateral step ups 10"x20          10" x20    monster walks  nv                      crossover step ups nv                                                                                                                                                                                             Modalities                                                                       Provided pt with black tb for home use

## 2023-02-26 ENCOUNTER — LAB REQUISITION (OUTPATIENT)
Dept: LAB | Facility: CLINIC | Age: 83
End: 2023-02-26

## 2023-02-26 VITALS
DIASTOLIC BLOOD PRESSURE: 90 MMHG | WEIGHT: 245 LBS | OXYGEN SATURATION: 93 % | HEIGHT: 72 IN | BODY MASS INDEX: 33.18 KG/M2 | SYSTOLIC BLOOD PRESSURE: 145 MMHG | TEMPERATURE: 97.2 F | HEART RATE: 86 BPM | RESPIRATION RATE: 18 BRPM

## 2023-02-26 DIAGNOSIS — Z00.01 ENCOUNTER FOR GENERAL ADULT MEDICAL EXAMINATION WITH ABNORMAL FINDINGS: ICD-10-CM

## 2023-02-27 ENCOUNTER — TRANSITIONAL CARE UNIT VISIT (OUTPATIENT)
Dept: GERIATRICS | Facility: CLINIC | Age: 83
End: 2023-02-27
Payer: MEDICARE

## 2023-02-27 ENCOUNTER — LAB REQUISITION (OUTPATIENT)
Dept: LAB | Facility: CLINIC | Age: 83
End: 2023-02-27

## 2023-02-27 DIAGNOSIS — I25.119 CORONARY ARTERY DISEASE INVOLVING NATIVE CORONARY ARTERY WITH ANGINA PECTORIS, UNSPECIFIED WHETHER NATIVE OR TRANSPLANTED HEART (H): ICD-10-CM

## 2023-02-27 DIAGNOSIS — J42 CHRONIC BRONCHITIS, UNSPECIFIED CHRONIC BRONCHITIS TYPE (H): ICD-10-CM

## 2023-02-27 DIAGNOSIS — N18.32 STAGE 3B CHRONIC KIDNEY DISEASE (H): ICD-10-CM

## 2023-02-27 DIAGNOSIS — I48.0 PAROXYSMAL A-FIB (H): ICD-10-CM

## 2023-02-27 DIAGNOSIS — I50.32 CHRONIC DIASTOLIC HEART FAILURE (H): ICD-10-CM

## 2023-02-27 DIAGNOSIS — I50.32 CHRONIC HEART FAILURE WITH PRESERVED EJECTION FRACTION (H): ICD-10-CM

## 2023-02-27 DIAGNOSIS — E66.01 MORBID OBESITY (H): Primary | ICD-10-CM

## 2023-02-27 DIAGNOSIS — E03.9 HYPOTHYROIDISM, UNSPECIFIED TYPE: ICD-10-CM

## 2023-02-27 DIAGNOSIS — G47.33 OSA (OBSTRUCTIVE SLEEP APNEA): ICD-10-CM

## 2023-02-27 DIAGNOSIS — I10 PRIMARY HYPERTENSION: ICD-10-CM

## 2023-02-27 DIAGNOSIS — I50.32 CHRONIC DIASTOLIC (CONGESTIVE) HEART FAILURE (H): ICD-10-CM

## 2023-02-27 PROCEDURE — 99309 SBSQ NF CARE MODERATE MDM 30: CPT | Performed by: NURSE PRACTITIONER

## 2023-02-27 PROCEDURE — 86481 TB AG RESPONSE T-CELL SUSP: CPT | Performed by: NURSE PRACTITIONER

## 2023-02-27 PROCEDURE — P9604 ONE-WAY ALLOW PRORATED TRIP: HCPCS | Performed by: NURSE PRACTITIONER

## 2023-02-27 PROCEDURE — 36415 COLL VENOUS BLD VENIPUNCTURE: CPT | Performed by: NURSE PRACTITIONER

## 2023-02-27 RX ORDER — ACETAMINOPHEN 500 MG
1000 TABLET ORAL 3 TIMES DAILY
COMMUNITY

## 2023-02-27 NOTE — PROGRESS NOTES
Filled out and returned following fax to plan:        Nayely Schaffer CPhT  [facility] Liaison  Pronouns: She/Her/Hers    [facility]  [address]  [address] [place] [address] [address], [place] [address]   [email or web address]   Phone: [phone]  Pager: [phone]  Fax: [phone]

## 2023-02-27 NOTE — PROGRESS NOTES
[facility] GERIATRICS    PRIMARY CARE PROVIDER AND CLINIC:  Anne Heath MD, [address] / [place] [address]  Chief Complaint   Patient presents with     Hospital F/U      [facility] Record Number:  [id number]  Place of Service where encounter took place:  [facility] ([facility]) [[id number]]    Erich Craven  is a 82 year old  (1940), admitted to the above facility from  [facility]. Hospital stay 2/21/23 through 2/24/23..   HPI:    This is a 82-year-old male with past medical history of heart failure preserved EF, coronary disease, A-fib on Coumadin, hypertension, hyperlipidemia, BPH, COPD, hypothyroidism who presented for an elective left reverse shoulder arthroplasty.  Postoperatively required 3.5 L supplemental oxygen, uses BiPAP at home, given sling and nonweightbearing on LUE, continued on chronic oxycodone for pain control and sustained mild ABLA with discharge hemoglobin 11.1.  No other changes noted, discharged to [facility] for rehab.    CODE STATUS/ADVANCE DIRECTIVES DISCUSSION:  Prior  CPR/Full code   ALLERGIES:   Allergies   Allergen Reactions     No Known Allergies       PAST MEDICAL HISTORY:   Past Medical History:   Diagnosis Date     (HFpEF) heart failure with preserved ejection fraction (H)      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 Started in 2012     Hypothyroidism      Lymphedema      Mumps      Obesity, Class  III, BMI 40-49.9 (morbid obesity) (H)      RIN (obstructive sleep apnea)     has refused CPAP     Pacemaker      Paroxysmal A-fib (H)      PE (pulmonary thromboembolism) (H)       PAST SURGICAL HISTORY:   has a past surgical history that includes Implant pacemaker; cardiac stenting; ENT surgery; orthopedic surgery (Right, 2012); Right Heart Catheterization (N/A, 11/5/2019); Inject Nerve Block Suprascapular (Bilateral, 8/26/2021); Inject Nerve Block Suprascapular (Bilateral, 9/23/2021); Nerve block peripheral (Bilateral, 3/10/2022); Inject Nerve Block Suprascapular (Left, 9/15/2022); Inject Nerve Block Suprascapular (Left, 10/20/2022); Inject Nerve Block Suprascapular (Left, 1/5/2023); and Reverse arthroplasty shoulder (Left, 2/21/2023).  FAMILY HISTORY: family history includes Brain Cancer in his mother; Cerebrovascular Disease in his father; Hypertension in his father.  SOCIAL HISTORY:   reports that he quit smoking about 34 years ago. His smoking use included cigarettes. He started smoking about 59 years ago. He has a 12.50 pack-year smoking history. He has never used smokeless tobacco. He reports that he does not drink alcohol and does not use drugs.  Patient's living condition: lives alone    Post Discharge Medication Reconciliation Status:   MED REC REQUIRED  Post Medication Reconciliation Status:  Discharge medications reconciled and changed, see notes/orders         Current Outpatient Medications   Medication Sig     acetaminophen (TYLENOL) 500 MG tablet Take 1,000 mg by mouth 3 times daily     Acetylcarnitine HCl (ACETYL L-CARNITINE PO) Take 1 capsule by mouth daily (Patient not taking: Reported on 2/27/2023)     albuterol (PROAIR HFA/PROVENTIL HFA/VENTOLIN HFA) 108 (90 Base) MCG/ACT inhaler Inhale 2 puffs into the lungs 4 times daily as needed for shortness of breath / dyspnea or wheezing     aspirin (ASA) 81 MG chewable tablet Take 1 tablet by mouth daily     atorvastatin (LIPITOR) 40 MG tablet Take 1  tablet (40 mg) by mouth every evening     diltiazem ER COATED BEADS (CARDIZEM CD/CARTIA XT) 300 MG 24 hr capsule Take 1 capsule (300 mg) by mouth daily     Fluticasone-Umeclidin-Vilanterol (TRELEGY ELLIPTA) 200-62.5-25 MCG/INH oral inhaler Inhale 1 puff into the lungs daily     furosemide (LASIX) 20 MG tablet Take 1 tablet (20 mg) by mouth every evening     furosemide (LASIX) 40 MG tablet Take 1 tablet (40 mg) by mouth every morning     isosorbide mononitrate (IMDUR) 30 MG 24 hr tablet Take 1 tablet (30 mg) by mouth daily     levothyroxine (SYNTHROID/LEVOTHROID) 175 MCG tablet TAKE 1 TABLET(175 MCG) BY MOUTH DAILY     lisinopril (ZESTRIL) 20 MG tablet Take 0.5 tablets (10 mg) by mouth daily     metolazone (ZAROXOLYN) 2.5 MG tablet TAKE 1 TABLET(2.5 MG) BY MOUTH TWICE DAILY     Multiple vitamin TABS Take 1 tablet by mouth daily     oxyCODONE (ROXICODONE) 5 MG tablet Take 1-2 tablets (5-10 mg) by mouth every 4 hours as needed for moderate pain (4-6)     polyethylene glycol (MIRALAX) 17 g packet Take 17 g by mouth daily     potassium chloride ER (KLOR-CON M) 10 MEQ CR tablet TAKE 1 TABLET(10 MEQ) BY MOUTH DAILY     senna-docusate (SENOKOT-S/PERICOLACE) 8.6-50 MG tablet Take 1 tablet by mouth 2 times daily     spironolactone (ALDACTONE) 25 MG tablet Take 1 tablet (25 mg) by mouth every other day     tamsulosin (FLOMAX) 0.4 MG capsule Take 1 capsule (0.4 mg) by mouth daily     TAURINE PO Take 1 capsule by mouth daily (Patient not taking: Reported on 2/27/2023)     venlafaxine (EFFEXOR) 75 MG tablet Take 1 tablet (75 mg) by mouth 2 times daily     warfarin ANTICOAGULANT (COUMADIN) 5 MG tablet As of January 23, 2023 - take 7.5 mg every Wed, Sat; 5 mg all other days of the week. Takes in the morning.  Or as directed by the INR clinic. INR DUE for further refills. (Patient taking differently: As of January 23, 2023 - take 7.5 mg every Wed, Sat; 5 mg all other days of the week. Recheck INR on 3/6)     No current  facility-administered medications for this visit.       ROS:  10 point ROS of systems including Constitutional, Eyes, Respiratory, Cardiovascular, Gastroenterology, Genitourinary, Integumentary, Musculoskeletal, Psychiatric were all negative except for pertinent positives noted in my HPI.    Vitals:  BP (!) 145/90   Pulse 86   Temp 97.2  F (36.2  C)   Resp 18   Ht 1.829 m (6')   Wt 111.1 kg (245 lb)   SpO2 93%   BMI 33.23 kg/m    Exam:  GENERAL APPEARANCE:  Alert, oriented, morbidly obese, cooperative, chronic pain  ENT:  Mouth and posterior oropharynx normal, moist mucous membranes  NECK:  No adenopathy,masses or thyromegaly  RESP:  no respiratory distress, diminished breath sounds bilaterally, 1L NC  CV:  peripheral edema 1+ in BLE, IRR for rate controlled A-fib  ABDOMEN:  normal bowel sounds, soft, nontender, no hepatosplenomegaly or other masses  M/S:   NWB on LUE  SKIN:  hemosiderin changes on BLEs  NEURO:   No focal deficits  PSYCH:  oriented X 3, memory impaired     Lab/Diagnostic data:    Most Recent 3 CBC's:  Recent Labs   Lab Test 02/23/23  0453 02/22/23  0938 02/02/23  1003 01/02/23  1047 12/14/22  0957   WBC  --  17.7* 9.1  --  10.2   HGB 11.1* 12.2* 13.0*   < > 12.5*   MCV  --  96 95  --  98   PLT  --  249 173  --  220    < > = values in this interval not displayed.       ASSESSMENT/PLAN:    Left shoulder arthroplasty  Right shoulder arthroplasty (H)  Given nonweightbearing on LUE, using sling, follow-up with Ortho on 3/8 per Dr. Vasquez    Chronic pain  Increase Tylenol to 1000 mg 3 times daily, continue oxycodone 5-10 mg every 4 hours as needed, start lidocaine patch and encourage icing    (I50.32) Chronic heart failure with preserved ejection fraction (H)  Continue Lasix 40 mg in a.m. and 20 mg in afternoon, metolazone 2.5 mg twice daily and spironolactone 25 mg EOD.  Start daily weights    (I48.0) Paroxysmal A-fib (H)  Last INR 2.2, continue Coumadin 7.5 mg W/SAT and 5 mg AOD, recheck INR on  3/6    (I10) Primary hypertension  (I48.0) Paroxysmal A-fib (H)  Continue isosorbide monohydrate 30 mg daily, lisinopril 10 mg daily, and diltiazem 300 mg daily.  Monitor blood pressure twice daily    (E66.01) Morbid obesity (H)  (primary encounter diagnosis)  Last BMI 33.3    (I25.119) Coronary artery disease involving native coronary artery with angina pectoris, unspecified whether native or transplanted heart (H)  Continue ASA 81 mg daily and statin    (N18.32) Stage 3b chronic kidney disease (H)  Last CR 0.87 with GFR >80, recheck BMP on 2/28    Hypokalemia  Continue potassium 10 MEQ, last K 3.7, recheck BMP on 2/28    Anemia  Last Hgb 11.1, recheck CBC on 2/28    (G47.33) RIN (obstructive sleep apnea)  (J42) Chronic bronchitis, unspecified chronic bronchitis   Continue Trelegy elliptica 200/62.5/25 mcg 1 puff daily and NIPPV, wean off oxygen to maintain sats 1 9%, currently 1 L nasal cannula    (E03.9) Hypothyroidism, unspecified type  Continue Synthroid at 175 mcg daily, last TSH 3.07 on 10/13/2022    Constipation  Continue MiraLAX daily and senna S 1 tab twice daily    BPH  Continue tamsulosin 0.4 mg daily    Depression  Continue venlafaxine 75 mg twice daily    Orders:  Increase blood pressure monitoring, daily weights, wean off supplemental oxygen, increase Tylenol with lidocaine patch and icing, BMP/CBC recheck    Electronically signed by:  Erich Meade NP

## 2023-02-28 LAB
ANION GAP SERPL CALCULATED.3IONS-SCNC: 12 MMOL/L (ref 7–15)
BUN SERPL-MCNC: 35.5 MG/DL (ref 8–23)
CALCIUM SERPL-MCNC: 9.4 MG/DL (ref 8.8–10.2)
CHLORIDE SERPL-SCNC: 95 MMOL/L (ref 98–107)
CREAT SERPL-MCNC: 1.3 MG/DL (ref 0.67–1.17)
DEPRECATED HCO3 PLAS-SCNC: 31 MMOL/L (ref 22–29)
ERYTHROCYTE [DISTWIDTH] IN BLOOD BY AUTOMATED COUNT: 15.3 % (ref 10–15)
GAMMA INTERFERON BACKGROUND BLD IA-ACNC: 0.03 IU/ML
GFR SERPL CREATININE-BSD FRML MDRD: 55 ML/MIN/1.73M2
GLUCOSE SERPL-MCNC: 92 MG/DL (ref 70–99)
HCT VFR BLD AUTO: 33.7 % (ref 40–53)
HGB BLD-MCNC: 10.4 G/DL (ref 13.3–17.7)
M TB IFN-G BLD-IMP: NEGATIVE
M TB IFN-G CD4+ BCKGRND COR BLD-ACNC: 0.74 IU/ML
MCH RBC QN AUTO: 29.8 PG (ref 26.5–33)
MCHC RBC AUTO-ENTMCNC: 30.9 G/DL (ref 31.5–36.5)
MCV RBC AUTO: 97 FL (ref 78–100)
MITOGEN IGNF BCKGRD COR BLD-ACNC: 0 IU/ML
MITOGEN IGNF BCKGRD COR BLD-ACNC: 0.01 IU/ML
PLATELET # BLD AUTO: 252 10E3/UL (ref 150–450)
POTASSIUM SERPL-SCNC: 3.5 MMOL/L (ref 3.4–5.3)
QUANTIFERON MITOGEN: 0.77 IU/ML
QUANTIFERON NIL TUBE: 0.03 IU/ML
QUANTIFERON TB1 TUBE: 0.04 IU/ML
QUANTIFERON TB2 TUBE: 0.03
RBC # BLD AUTO: 3.49 10E6/UL (ref 4.4–5.9)
SODIUM SERPL-SCNC: 138 MMOL/L (ref 136–145)
WBC # BLD AUTO: 9.2 10E3/UL (ref 4–11)

## 2023-02-28 PROCEDURE — 80048 BASIC METABOLIC PNL TOTAL CA: CPT | Performed by: NURSE PRACTITIONER

## 2023-02-28 PROCEDURE — 36415 COLL VENOUS BLD VENIPUNCTURE: CPT | Performed by: NURSE PRACTITIONER

## 2023-02-28 PROCEDURE — 85014 HEMATOCRIT: CPT | Performed by: NURSE PRACTITIONER

## 2023-02-28 PROCEDURE — P9604 ONE-WAY ALLOW PRORATED TRIP: HCPCS | Performed by: NURSE PRACTITIONER

## 2023-02-28 NOTE — PROGRESS NOTES
[place] [facility]  INITIAL VISIT NOTE  March 1, 2023    PRIMARY CARE PROVIDER AND CLINIC:  Anne Heath Ricky [address] / [place] [address]    CHIEF COMPLAINT:  Hospital follow-up/Initial visit    HPI:    Erich Craven is a 82 year old  (1940) male who was seen at [place] [facility] on March 1, 2023 for an initial visit.     Medical history is notable for CAD, HFpEF, pulmonary hypertension, lymphedema, hypertension, dyslipidemia, PAF, sick sinus syndrome, s/p permanent pacemaker, PE, COPD, hypothyroidism, BPH, prediabetes, CKD, chronic anemia, anxiety, depression, shingles, osteoarthritis, obesity, and RIN.    Summary of hospital course:  Patient was hospitalized at [facility] from February 21 through February 24, 2023 for elective left shoulder surgery for left cuff tear arthropathy.  He underwent left reverse total shoulder arthroplasty on February 21, 2023.  EBL was 250 mL.  Postop hemoglobin dropped to 11.1.  Postoperatively, he required 2.5 L of supplemental oxygen.  Warfarin was resumed after the surgery.  TCU was recommended for rehab.    Patient is admitted to this facility for medical management, nursing care, and rehab.     Of note, history was obtained from patient, facility RN, and extensive review of the chart.    Today's visit:  Patient was seen in his room,, while sitting in the wheelchair.  He appears frail but comfortable and in no acute distress.  He has been weaned off supplemental oxygen since last night.  He had a bowel movement this morning.  He is wearing a left shoulder brace.  At times his left shoulder surgical pain is 7-8 out of 10.  He denies fever, chills, chest pain, palpitation, dyspnea, nausea, vomiting, abdominal pain, or urinary symptoms.      CODE STATUS:   CPR/Full code     PAST MEDICAL HISTORY:   CAD, s/p SUMMER to proximal LAD, proximal RCA, and middle RCA in December 2014  Chronic HFpEF (LVEF 60-65% in  February 2023)  Mild pulmonary hypertension  Lymphedema  Hypertension  Dyslipidemia  PAF  SSS, s/p permanent pacemaker in March 2015  PE in September 2012 (post right knee surgery)  Mild aortic root dilation  COPD  Hypothyroidism  BPH  Prediabetes  CKD stage II, baseline creatinine  0.8-1.2  Chronic anemia, baseline hemoglobin around 12  Diverticulosis of colon  Diverticulitis of colon in July 2022  Pulmonary nodules  Mumps  Probable left frontal meningioma  Shingles  Anxiety  Depression  Right ankle sprain in September 2022  Osteoarthritis  Obesity, BMI 33.3  RIN, on BiPAP       Past Medical History:   Diagnosis Date     (HFpEF) heart failure with preserved ejection fraction (H)      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 Started in 2012     Hypothyroidism      Lymphedema      Mumps      Obesity, Class III, BMI 40-49.9 (morbid obesity) (H)      RIN (obstructive sleep apnea)     has refused CPAP     Pacemaker      Paroxysmal A-fib (H)      PE (pulmonary thromboembolism) (H)        PAST SURGICAL HISTORY:   Past Surgical History:   Procedure Laterality Date     cardiac stenting       CV RIGHT HEART CATH MEASUREMENTS RECORDED N/A 11/5/2019    Procedure: Right Heart Cath;  Surgeon: Roberto Hernandez MD;  Location:  HEART CARDIAC CATH LAB     ENT SURGERY      tonsillectomy     IMPLANT PACEMAKER       INJECT NERVE BLOCK SUPRASCAPULAR Bilateral 8/26/2021    Procedure: BLOCK, NERVE, SUPRASCAPULAR, Bilateral,  under ultrasound guidance;  Surgeon: Swetha Allen MD;  Location: [facility] OR     INJECT NERVE BLOCK SUPRASCAPULAR Bilateral 2021    Procedure: bilateral suprascapular nerve block;  Surgeon: Swetha Allen MD;  Location: [facility] OR     INJECT NERVE BLOCK SUPRASCAPULAR Left 9/15/2022    Procedure: BLOCK, NERVE, SUPRASCAPULAR- left;  Surgeon: Swetha Allen MD;  Location: [facility] OR     INJECT NERVE BLOCK SUPRASCAPULAR Left 10/20/2022    Procedure: BLOCK, NERVE, SUPRASCAPULAR- left;  Surgeon: Swetha Allen MD;  Location: [facility] OR     INJECT NERVE BLOCK SUPRASCAPULAR Left 2023    Procedure: BLOCK, NERVE, SUPRASCAPULAR (left);  Surgeon: Swetha Allen MD;  Location: [facility] OR     NERVE BLOCK PERIPHERAL Bilateral 3/10/2022    Procedure: bilateral suprascapular nerve block;  Surgeon: Swetha Allen MD;  Location: [facility] OR     ORTHOPEDIC SURGERY Right 2012    knee replacement     REVERSE ARTHROPLASTY SHOULDER Left 2023    Procedure: ARTHROPLASTY, SHOULDER, TOTAL, REVERSE LEFT;  Surgeon: Katelyn Vasquez MD;  Location: [facility] OR       FAMILY HISTORY:   Family History   Problem Relation Age of Onset     Brain Cancer Mother      Cerebrovascular Disease Father      Hypertension Father      Anesthesia Reaction No family hx of      Thrombosis No family hx of        SOCIAL HISTORY:  Social History     Tobacco Use     Smoking status: Former     Packs/day: 0.50     Years: 25.00     Pack years: 12.50     Types: Cigarettes     Start date:      Quit date:      Years since quittin.1     Smokeless tobacco: Never   Substance Use Topics     Alcohol use: No     Comment: quit in ; recovering       MEDICATIONS:  Current Outpatient Medications   Medication Sig Dispense Refill     acetaminophen (TYLENOL) 500 MG tablet Take 1,000 mg by mouth 3 times daily       Acetylcarnitine HCl (ACETYL L-CARNITINE PO) Take 1 capsule by mouth daily (Patient not taking: Reported on 2023)       albuterol (PROAIR HFA/PROVENTIL  HFA/VENTOLIN HFA) 108 (90 Base) MCG/ACT inhaler Inhale 2 puffs into the lungs 4 times daily as needed for shortness of breath / dyspnea or wheezing       aspirin (ASA) 81 MG chewable tablet Take 1 tablet by mouth daily       atorvastatin (LIPITOR) 40 MG tablet Take 1 tablet (40 mg) by mouth every evening 90 tablet 3     diltiazem ER COATED BEADS (CARDIZEM CD/CARTIA XT) 300 MG 24 hr capsule Take 1 capsule (300 mg) by mouth daily 90 capsule 3     Fluticasone-Umeclidin-Vilanterol (TRELEGY ELLIPTA) 200-62.5-25 MCG/INH oral inhaler Inhale 1 puff into the lungs daily       furosemide (LASIX) 20 MG tablet Take 1 tablet (20 mg) by mouth every evening 30 tablet 0     furosemide (LASIX) 40 MG tablet Take 1 tablet (40 mg) by mouth every morning 90 tablet 1     isosorbide mononitrate (IMDUR) 30 MG 24 hr tablet Take 1 tablet (30 mg) by mouth daily 90 tablet 3     levothyroxine (SYNTHROID/LEVOTHROID) 175 MCG tablet TAKE 1 TABLET(175 MCG) BY MOUTH DAILY 90 tablet 2     lisinopril (ZESTRIL) 20 MG tablet Take 0.5 tablets (10 mg) by mouth daily 45 tablet 3     metolazone (ZAROXOLYN) 2.5 MG tablet TAKE 1 TABLET(2.5 MG) BY MOUTH TWICE DAILY 180 tablet 2     Multiple vitamin TABS Take 1 tablet by mouth daily       oxyCODONE (ROXICODONE) 5 MG tablet Take 1-2 tablets (5-10 mg) by mouth every 4 hours as needed for moderate pain (4-6) 26 tablet 0     polyethylene glycol (MIRALAX) 17 g packet Take 17 g by mouth daily 7 packet 0     potassium chloride ER (KLOR-CON M) 10 MEQ CR tablet TAKE 1 TABLET(10 MEQ) BY MOUTH DAILY 90 tablet 0     senna-docusate (SENOKOT-S/PERICOLACE) 8.6-50 MG tablet Take 1 tablet by mouth 2 times daily 30 tablet 0     spironolactone (ALDACTONE) 25 MG tablet Take 1 tablet (25 mg) by mouth every other day 45 tablet 0     tamsulosin (FLOMAX) 0.4 MG capsule Take 1 capsule (0.4 mg) by mouth daily 30 capsule 0     TAURINE PO Take 1 capsule by mouth daily (Patient not taking: Reported on 2/27/2023)       venlafaxine (EFFEXOR)  75 MG tablet Take 1 tablet (75 mg) by mouth 2 times daily 180 tablet 1     warfarin ANTICOAGULANT (COUMADIN) 5 MG tablet As of January 23, 2023 - take 7.5 mg every Wed, Sat; 5 mg all other days of the week. Takes in the morning.  Or as directed by the INR clinic. INR DUE for further refills. (Patient taking differently: As of January 23, 2023 - take 7.5 mg every Wed, Sat; 5 mg all other days of the week. Recheck INR on 3/6)         MED REC REQUIRED  Post Medication Reconciliation Status: medication reconcilation previously completed during another office visit      ALLERGIES:  Allergies   Allergen Reactions     No Known Allergies        ROS:  10 point ROS were negative other than the symptoms noted above in the HPI.    PHYSICAL EXAM:  Vital signs were reviewed in the chart.  Vital Signs: BP 97/58   Pulse 80   Temp 97.8  F (36.6  C)   Resp 18   Ht 1.829 m (6')   Wt 111.5 kg (245 lb 12.8 oz)   SpO2 93%   BMI 33.34 kg/m    General: Frail appearing but comfortable and in no acute distress  HEENT: Conjunctival pallor, no scleral icterus or injection, moist oral mucosa  Cardiovascular: Normal S1, S2, RRR  Respiratory: Lungs clear to auscultation bilaterally  GI: Abdomen soft, non-tender, non-distended, +BS  Extremities: 2-3+ bilateral LE edema, left shoulder brace is on  Neuro: CX II-XII grossly intact; ROM in all four extremities grossly intact  Psych: Alert and oriented x3; normal affect  Skin: Left shoulder surgical wound is dressed and looks chronic    LABORATORY/IMAGING DATA:  All relevant labs and imaging data in [facility] and/or Care Everywhere were personally reviewed today.    Hematology profile (February 28, 2023): White count 9.2, hemoglobin 10.4, platelet count 252,000, MCV 97    Chemistry profile (February 28, 2023): Sodium 138, potassium 2.5, BUN 35.5, creatinine 1.3, glucose 92, calcium 9.4      Most Recent 3 CBC's:Recent Labs   Lab Test 02/23/23  0453 02/22/23  0938 02/02/23  1003 01/02/23  1047  12/14/22  0957   WBC  --  17.7* 9.1  --  10.2   HGB 11.1* 12.2* 13.0*   < > 12.5*   MCV  --  96 95  --  98   PLT  --  249 173  --  220    < > = values in this interval not displayed.     Most Recent 3 BMP's:Recent Labs   Lab Test 02/23/23  0534 02/22/23  0938 02/22/23  0704 02/21/23  0632 02/02/23  1003 01/02/23  1047   NA  --  137  --   --  141 137   POTASSIUM  --  3.7  --   --  4.0 4.1   CHLORIDE  --  98  --   --  98 95*   CO2  --  28  --   --  33* 28   BUN  --  15.1  --   --  36.6* 25.5*   CR  --  0.87  --   --  1.04 0.96   ANIONGAP  --  11  --   --  10 14   LAWSON  --  9.5  --   --  9.9 9.7   * 123* 148*   < > 97 81    < > = values in this interval not displayed.     Most Recent 2 LFT's:Recent Labs   Lab Test 02/22/23  0938 08/02/22  0829   AST 21 13   ALT 13 17   ALKPHOS 98 100   BILITOTAL 0.3 0.4         ASSESSMENT/PLAN:  Left shoulder cuff tear arthropathy, s/p left reverse total shoulder arthroplasty on February 21, 2023,  Physical deconditioning.  Patient is hemodynamically stable.  Patient at times has moderate surgical pain.  Plan:  NWB to left UE  Continue pain management with lidocaine patch, acetaminophen 1000 mg 3 times daily, and as needed oxycodone 5-10 mg every 4 hours  DVT prophylaxis with chronic warfarin  PT/OT evaluation and therapy  Follow-up with Ortho as directed    COPD,  Postop hypoxia, resolved.  Patient was discharged on supplemental oxygen.  He is now weaned off supplemental oxygen and saturating at 96% on room air.  Plan:  Continue Trelegy Ellipta 1 puff daily  Continue albuterol inhaler 2 puffs 4 times a day as needed  Monitor respiratory status    Acute blood loss anemia,   Chronic anemia.  Baseline hemoglobin around 12.  Postop hemoglobin dropped to 11.1 on February 23.  Last hemoglobin was 10.4 on February 28.  Plan:  Monitor hemoglobin periodically  Transfuse PRN for hemoglobin less than 7    DONA,  CKD stage II-IIIa.  Baseline creatinine historically in the range of  0.8-1.2.  Last creatinine was 1.3 on February 28, up from 0.87 on February 22.  On today's examination, the patient does not appear volume depleted.  Plan:  Avoid NSAIDs and nephrotoxins  Recheck BMP on March 3    CAD, s/p SUMMER to proximal LAD, proximal RCA, and middle RCA in December 2014,  Essential hypertension,  Dyslipidemia.  Stable.  Blood pressure is fairly controlled.  Plan:  Continue aspirin 81 mg daily  Continue atorvastatin 40 mg daily  Continue diltiazem  mg daily  Continue Imdur 30 mg daily  Continue lisinopril 10 mg daily  Continue furosemide 40 mg in the morning and 20 mg in the afternoon  Continue metolazone 2.5 mg twice daily  Continue spironolactone 25 mg every other day  Monitor blood pressure and cardiac status  Follow-up with cardiology as directed     Chronic HFpEF (LVEF 60-65%, per echo on February 2, 2023),  Mild pulmonary hypertension,  Chronic lymphedema.  Patient currently weighs 245.8 LBS and has 2-3+ bilateral leg edema.  Plan:  Continue furosemide 40 mg in the morning and 20 mg in the afternoon   Continue metolazone 2.5 mg twice daily  Continue spironolactone 25 mg every other day  Continue potassium chloride 10 mEq daily  Monitor weight and volume status  Follow-up with cardiology as directed     PAF,  SSS, s/p permanent pacemaker in March 2015.  Rate is controlled.  PTA warfarin: 5 mg Wednesday and Saturday and 7.5 mg all other days.  Last INR was 2.2 on February 27.  Plan:  Continue diltiazem  mg daily  Continue chronic anticoagulation with warfarin  Repeat INR on March 6  Adjust warfarin dose for INR target of 2-3     History of PE in September 2012 (post right knee surgery).  Negative venous Doppler for DVT at that time.  Plan:  Continue chronic anticoagulation with warfarin as above     Hypothyroidism.   Last TSH was 1.58 on December 14, 2022.  Plan:  Continue levothyroxine 175 mcg daily     BPH.  Plan:  Continue tamsulosin 0.4 mg daily     Prediabetes.  Last hemoglobin  A1c was 5.9% on December 14, 2022.  Plan:  Monitor blood glucose PRN  Outpatient follow-up with PCP     Anxiety,  Depression.  Plan:  Continue venlafaxine 37.5 mg twice daily  Monitor symptoms    Slow transit constipation.  Plan:  Continue the bowel regimen     Obesity, BMI 33.3,  RIN, on BiPAP.  Plan:  Continue nocturnal BiPAP per home settings           Orders written by provider at facility:  BMP on March 3; DX: CKD        Disclaimer: This note may contain text created using speech-recognition software and may contain unintended word substitutions.      Electronically signed by:  Gabe Recinos MD

## 2023-03-01 ENCOUNTER — TRANSITIONAL CARE UNIT VISIT (OUTPATIENT)
Dept: GERIATRICS | Facility: CLINIC | Age: 83
End: 2023-03-01
Payer: MEDICARE

## 2023-03-01 VITALS
TEMPERATURE: 97.8 F | HEART RATE: 80 BPM | WEIGHT: 245.8 LBS | DIASTOLIC BLOOD PRESSURE: 58 MMHG | BODY MASS INDEX: 33.29 KG/M2 | OXYGEN SATURATION: 93 % | HEIGHT: 72 IN | RESPIRATION RATE: 18 BRPM | SYSTOLIC BLOOD PRESSURE: 97 MMHG

## 2023-03-01 DIAGNOSIS — I10 ESSENTIAL HYPERTENSION: ICD-10-CM

## 2023-03-01 DIAGNOSIS — E66.09 CLASS 1 OBESITY DUE TO EXCESS CALORIES WITH SERIOUS COMORBIDITY AND BODY MASS INDEX (BMI) OF 33.0 TO 33.9 IN ADULT: ICD-10-CM

## 2023-03-01 DIAGNOSIS — F41.9 ANXIETY: ICD-10-CM

## 2023-03-01 DIAGNOSIS — D64.9 CHRONIC ANEMIA: ICD-10-CM

## 2023-03-01 DIAGNOSIS — R73.03 PREDIABETES: ICD-10-CM

## 2023-03-01 DIAGNOSIS — E78.5 DYSLIPIDEMIA: ICD-10-CM

## 2023-03-01 DIAGNOSIS — N18.31 STAGE 3A CHRONIC KIDNEY DISEASE (H): ICD-10-CM

## 2023-03-01 DIAGNOSIS — R09.02 HYPOXIA: ICD-10-CM

## 2023-03-01 DIAGNOSIS — I50.32 CHRONIC HEART FAILURE WITH PRESERVED EJECTION FRACTION (H): ICD-10-CM

## 2023-03-01 DIAGNOSIS — I25.10 CORONARY ARTERY DISEASE INVOLVING NATIVE CORONARY ARTERY OF NATIVE HEART WITHOUT ANGINA PECTORIS: ICD-10-CM

## 2023-03-01 DIAGNOSIS — D62 ACUTE BLOOD LOSS ANEMIA: ICD-10-CM

## 2023-03-01 DIAGNOSIS — F32.A DEPRESSION, UNSPECIFIED DEPRESSION TYPE: ICD-10-CM

## 2023-03-01 DIAGNOSIS — E66.811 CLASS 1 OBESITY DUE TO EXCESS CALORIES WITH SERIOUS COMORBIDITY AND BODY MASS INDEX (BMI) OF 33.0 TO 33.9 IN ADULT: ICD-10-CM

## 2023-03-01 DIAGNOSIS — E03.9 HYPOTHYROIDISM, UNSPECIFIED TYPE: ICD-10-CM

## 2023-03-01 DIAGNOSIS — N17.9 ACUTE KIDNEY INJURY (H): ICD-10-CM

## 2023-03-01 DIAGNOSIS — K59.01 SLOW TRANSIT CONSTIPATION: ICD-10-CM

## 2023-03-01 DIAGNOSIS — R53.81 PHYSICAL DECONDITIONING: ICD-10-CM

## 2023-03-01 DIAGNOSIS — Z96.612 STATUS POST TOTAL REPLACEMENT OF LEFT SHOULDER: Primary | ICD-10-CM

## 2023-03-01 DIAGNOSIS — I27.20 PULMONARY HYPERTENSION (H): ICD-10-CM

## 2023-03-01 DIAGNOSIS — J42 CHRONIC BRONCHITIS, UNSPECIFIED CHRONIC BRONCHITIS TYPE (H): ICD-10-CM

## 2023-03-01 DIAGNOSIS — G47.33 OSA (OBSTRUCTIVE SLEEP APNEA): ICD-10-CM

## 2023-03-01 DIAGNOSIS — Z86.711 HISTORY OF PULMONARY EMBOLISM: ICD-10-CM

## 2023-03-01 DIAGNOSIS — Z95.0 CARDIAC PACEMAKER IN SITU: ICD-10-CM

## 2023-03-01 DIAGNOSIS — N40.0 BENIGN PROSTATIC HYPERPLASIA, UNSPECIFIED WHETHER LOWER URINARY TRACT SYMPTOMS PRESENT: ICD-10-CM

## 2023-03-01 DIAGNOSIS — I48.0 PAF (PAROXYSMAL ATRIAL FIBRILLATION) (H): ICD-10-CM

## 2023-03-01 PROCEDURE — 99305 1ST NF CARE MODERATE MDM 35: CPT | Performed by: INTERNAL MEDICINE

## 2023-03-01 NOTE — LETTER
3/1/2023        RE: Erich Craven  [address]  [facility] [id number]        [facility]  INITIAL VISIT NOTE  March 1, 2023    PRIMARY CARE PROVIDER AND CLINIC:  Anne Heath [address] / [place] [address]    CHIEF COMPLAINT:  Hospital follow-up/Initial visit    HPI:    Erich Craven is a 82 year old  (1940) male who [facility], 2023 for an initial visit.     Medical history is notable for CAD, HFpEF, pulmonary hypertension, lymphedema, hypertension, dyslipidemia, PAF, sick sinus syndrome, s/p permanent pacemaker, PE, COPD, hypothyroidism, BPH, prediabetes, CKD, chronic anemia, anxiety, depression, shingles, osteoarthritis, obesity, and RIN.    Summary of hospital course:  Patient was hospitalized at [facility] from February 21 through February 24, 2023 for elective left shoulder surgery for left cuff tear arthropathy.  He underwent left reverse total shoulder arthroplasty on February 21, 2023.  EBL was 250 mL.  Postop hemoglobin dropped to 11.1.  Postoperatively, he required 2.5 L of supplemental oxygen.  Warfarin was resumed after the surgery.  TCU was recommended for rehab.    Patient is admitted to this facility for medical management, nursing care, and rehab.     Of note, history was obtained from patient, facility RN, and extensive review of the chart.    Today's visit:  Patient was seen in his room,, while sitting in the wheelchair.  He appears frail but comfortable and in no acute distress.  He has been weaned off supplemental oxygen since last night.  He had a bowel movement this morning.  He is wearing a left shoulder brace.  At times his left shoulder surgical pain is 7-8 out of 10.  He denies fever, chills, chest pain, palpitation, dyspnea, nausea, vomiting, abdominal pain, or urinary symptoms.      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  Chronic anemia, baseline hemoglobin around 12  Diverticulosis of colon  Diverticulitis of colon in July 2022  Pulmonary nodules  Mumps  Probable left frontal meningioma  Shingles  Anxiety  Depression  Right ankle sprain in September 2022  Osteoarthritis  Obesity, BMI 33.3  RIN, on BiPAP       Past Medical History:   Diagnosis Date     (HFpEF) heart failure with preserved ejection fraction (H)      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 Started in 2012     Hypothyroidism      Lymphedema      Mumps      Obesity, Class III, BMI 40-49.9 (morbid obesity) (H)      RIN (obstructive sleep apnea)     has refused CPAP     Pacemaker      Paroxysmal A-fib (H)      PE (pulmonary thromboembolism) (H)        PAST SURGICAL HISTORY:   Past Surgical History:   Procedure Laterality Date     cardiac stenting       CV RIGHT HEART CATH MEASUREMENTS RECORDED N/A 11/5/2019    Procedure: Right Heart Cath;  Surgeon: Roberto Hernandez MD;  Location:  HEART CARDIAC CATH LAB     ENT SURGERY      tonsillectomy     IMPLANT PACEMAKER       INJECT  NERVE BLOCK SUPRASCAPULAR Bilateral 2021    Procedure: BLOCK, NERVE, SUPRASCAPULAR, Bilateral, under ultrasound guidance;  Surgeon: Swetha Allen MD;  Location: [facility] OR     INJECT NERVE BLOCK SUPRASCAPULAR Bilateral 2021    Procedure: bilateral suprascapular nerve block;  Surgeon: Swetha Allen MD;  Location: [facility] OR     INJECT NERVE BLOCK SUPRASCAPULAR Left 9/15/2022    Procedure: BLOCK, NERVE, SUPRASCAPULAR- left;  Surgeon: Swetha Allen MD;  Location: [facility] OR     INJECT NERVE BLOCK SUPRASCAPULAR Left 10/20/2022    Procedure: BLOCK, NERVE, SUPRASCAPULAR- left;  Surgeon: Swetha Allen MD;  Location: [facility] OR     INJECT NERVE BLOCK SUPRASCAPULAR Left 2023    Procedure: BLOCK, NERVE, SUPRASCAPULAR (left);  Surgeon: Swetha Allen MD;  Location: [facility] OR     NERVE BLOCK PERIPHERAL Bilateral 3/10/2022    Procedure: bilateral suprascapular nerve block;  Surgeon: Swetha Allen MD;  Location: [facility] OR     ORTHOPEDIC SURGERY Right 2012    knee replacement     REVERSE ARTHROPLASTY SHOULDER Left 2023    Procedure: ARTHROPLASTY, SHOULDER, TOTAL, REVERSE LEFT;  Surgeon: Katelyn Vasquez MD;  Location: [facility] OR       FAMILY HISTORY:   Family History   Problem Relation Age of Onset     Brain Cancer Mother      Cerebrovascular Disease Father      Hypertension Father      Anesthesia Reaction No family hx of      Thrombosis No family hx of        SOCIAL HISTORY:  Social History     Tobacco Use     Smoking status: Former     Packs/day: 0.50     Years: 25.00     Pack years: 12.50     Types: Cigarettes     Start date:      Quit date:      Years since quittin.1     Smokeless tobacco: Never   Substance Use Topics     Alcohol use: No     Comment: quit in ; recovering       MEDICATIONS:  Current Outpatient Medications   Medication Sig Dispense Refill     acetaminophen (TYLENOL) 500 MG tablet Take 1,000 mg by mouth 3 times daily       Acetylcarnitine HCl (ACETYL L-CARNITINE PO) Take 1 capsule  by mouth daily (Patient not taking: Reported on 2/27/2023)       albuterol (PROAIR HFA/PROVENTIL HFA/VENTOLIN HFA) 108 (90 Base) MCG/ACT inhaler Inhale 2 puffs into the lungs 4 times daily as needed for shortness of breath / dyspnea or wheezing       aspirin (ASA) 81 MG chewable tablet Take 1 tablet by mouth daily       atorvastatin (LIPITOR) 40 MG tablet Take 1 tablet (40 mg) by mouth every evening 90 tablet 3     diltiazem ER COATED BEADS (CARDIZEM CD/CARTIA XT) 300 MG 24 hr capsule Take 1 capsule (300 mg) by mouth daily 90 capsule 3     Fluticasone-Umeclidin-Vilanterol (TRELEGY ELLIPTA) 200-62.5-25 MCG/INH oral inhaler Inhale 1 puff into the lungs daily       furosemide (LASIX) 20 MG tablet Take 1 tablet (20 mg) by mouth every evening 30 tablet 0     furosemide (LASIX) 40 MG tablet Take 1 tablet (40 mg) by mouth every morning 90 tablet 1     isosorbide mononitrate (IMDUR) 30 MG 24 hr tablet Take 1 tablet (30 mg) by mouth daily 90 tablet 3     levothyroxine (SYNTHROID/LEVOTHROID) 175 MCG tablet TAKE 1 TABLET(175 MCG) BY MOUTH DAILY 90 tablet 2     lisinopril (ZESTRIL) 20 MG tablet Take 0.5 tablets (10 mg) by mouth daily 45 tablet 3     metolazone (ZAROXOLYN) 2.5 MG tablet TAKE 1 TABLET(2.5 MG) BY MOUTH TWICE DAILY 180 tablet 2     Multiple vitamin TABS Take 1 tablet by mouth daily       oxyCODONE (ROXICODONE) 5 MG tablet Take 1-2 tablets (5-10 mg) by mouth every 4 hours as needed for moderate pain (4-6) 26 tablet 0     polyethylene glycol (MIRALAX) 17 g packet Take 17 g by mouth daily 7 packet 0     potassium chloride ER (KLOR-CON M) 10 MEQ CR tablet TAKE 1 TABLET(10 MEQ) BY MOUTH DAILY 90 tablet 0     senna-docusate (SENOKOT-S/PERICOLACE) 8.6-50 MG tablet Take 1 tablet by mouth 2 times daily 30 tablet 0     spironolactone (ALDACTONE) 25 MG tablet Take 1 tablet (25 mg) by mouth every other day 45 tablet 0     tamsulosin (FLOMAX) 0.4 MG capsule Take 1 capsule (0.4 mg) by mouth daily 30 capsule 0     TAURINE PO Take  1 capsule by mouth daily (Patient not taking: Reported on 2/27/2023)       venlafaxine (EFFEXOR) 75 MG tablet Take 1 tablet (75 mg) by mouth 2 times daily 180 tablet 1     warfarin ANTICOAGULANT (COUMADIN) 5 MG tablet As of January 23, 2023 - take 7.5 mg every Wed, Sat; 5 mg all other days of the week. Takes in the morning.  Or as directed by the INR clinic. INR DUE for further refills. (Patient taking differently: As of January 23, 2023 - take 7.5 mg every Wed, Sat; 5 mg all other days of the week. Recheck INR on 3/6)         MED REC REQUIRED  Post Medication Reconciliation Status: medication reconcilation previously completed during another office visit      ALLERGIES:  Allergies   Allergen Reactions     No Known Allergies        ROS:  10 point ROS were negative other than the symptoms noted above in the HPI.    PHYSICAL EXAM:  Vital signs were reviewed in the chart.  Vital Signs: BP 97/58   Pulse 80   Temp 97.8  F (36.6  C)   Resp 18   Ht 1.829 m (6')   Wt 111.5 kg (245 lb 12.8 oz)   SpO2 93%   BMI 33.34 kg/m    General: Frail appearing but comfortable and in no acute distress  HEENT: Conjunctival pallor, no scleral icterus or injection, moist oral mucosa  Cardiovascular: Normal S1, S2, RRR  Respiratory: Lungs clear to auscultation bilaterally  GI: Abdomen soft, non-tender, non-distended, +BS  Extremities: 2-3+ bilateral LE edema, left shoulder brace is on  Neuro: CX II-XII grossly intact; ROM in all four extremities grossly intact  Psych: Alert and oriented x3; normal affect  Skin: Left shoulder surgical wound is dressed and looks chronic    LABORATORY/IMAGING DATA:  All relevant labs and imaging data in [facility] and/or Care Everywhere were personally reviewed today.    Hematology profile (February 28, 2023): White count 9.2, hemoglobin 10.4, platelet count 252,000, MCV 97    Chemistry profile (February 28, 2023): Sodium 138, potassium 2.5, BUN 35.5, creatinine 1.3, glucose 92, calcium 9.4      Most Recent 3  CBC's:Recent Labs   Lab Test 02/23/23  0453 02/22/23  0938 02/02/23  1003 01/02/23  1047 12/14/22  0957   WBC  --  17.7* 9.1  --  10.2   HGB 11.1* 12.2* 13.0*   < > 12.5*   MCV  --  96 95  --  98   PLT  --  249 173  --  220    < > = values in this interval not displayed.     Most Recent 3 BMP's:Recent Labs   Lab Test 02/23/23  0534 02/22/23  0938 02/22/23  0704 02/21/23  0632 02/02/23  1003 01/02/23  1047   NA  --  137  --   --  141 137   POTASSIUM  --  3.7  --   --  4.0 4.1   CHLORIDE  --  98  --   --  98 95*   CO2  --  28  --   --  33* 28   BUN  --  15.1  --   --  36.6* 25.5*   CR  --  0.87  --   --  1.04 0.96   ANIONGAP  --  11  --   --  10 14   LAWSON  --  9.5  --   --  9.9 9.7   * 123* 148*   < > 97 81    < > = values in this interval not displayed.     Most Recent 2 LFT's:Recent Labs   Lab Test 02/22/23  0938 08/02/22  0829   AST 21 13   ALT 13 17   ALKPHOS 98 100   BILITOTAL 0.3 0.4         ASSESSMENT/PLAN:  Left shoulder cuff tear arthropathy, s/p left reverse total shoulder arthroplasty on February 21, 2023,  Physical deconditioning.  Patient is hemodynamically stable.  Patient at times has moderate surgical pain.  Plan:  NWB to left UE  Continue pain management with lidocaine patch, acetaminophen 1000 mg 3 times daily, and as needed oxycodone 5-10 mg every 4 hours  DVT prophylaxis with chronic warfarin  PT/OT evaluation and therapy  Follow-up with Ortho as directed    COPD,  Postop hypoxia, resolved.  Patient was discharged on supplemental oxygen.  He is now weaned off supplemental oxygen and saturating at 96% on room air.  Plan:  Continue Trelegy Ellipta 1 puff daily  Continue albuterol inhaler 2 puffs 4 times a day as needed  Monitor respiratory status    Acute blood loss anemia,   Chronic anemia.  Baseline hemoglobin around 12.  Postop hemoglobin dropped to 11.1 on February 23.  Last hemoglobin was 10.4 on February 28.  Plan:  Monitor hemoglobin periodically  Transfuse PRN for hemoglobin less than  7    DONA,  CKD stage II-IIIa.  Baseline creatinine historically in the range of 0.8-1.2.  Last creatinine was 1.3 on February 28, up from 0.87 on February 22.  On today's examination, the patient does not appear volume depleted.  Plan:  Avoid NSAIDs and nephrotoxins  Recheck BMP on March 3    CAD, s/p SUMMER to proximal LAD, proximal RCA, and middle RCA in December 2014,  Essential hypertension,  Dyslipidemia.  Stable.  Blood pressure is fairly controlled.  Plan:  Continue aspirin 81 mg daily  Continue atorvastatin 40 mg daily  Continue diltiazem  mg daily  Continue Imdur 30 mg daily  Continue lisinopril 10 mg daily  Continue furosemide 40 mg in the morning and 20 mg in the afternoon  Continue metolazone 2.5 mg twice daily  Continue spironolactone 25 mg every other day  Monitor blood pressure and cardiac status  Follow-up with cardiology as directed     Chronic HFpEF (LVEF 60-65%, per echo on February 2, 2023),  Mild pulmonary hypertension,  Chronic lymphedema.  Patient currently weighs 245.8 LBS and has 2-3+ bilateral leg edema.  Plan:  Continue furosemide 40 mg in the morning and 20 mg in the afternoon   Continue metolazone 2.5 mg twice daily  Continue spironolactone 25 mg every other day  Continue potassium chloride 10 mEq daily  Monitor weight and volume status  Follow-up with cardiology as directed     PAF,  SSS, s/p permanent pacemaker in March 2015.  Rate is controlled.  PTA warfarin: 5 mg Wednesday and Saturday and 7.5 mg all other days.  Last INR was 2.2 on February 27.  Plan:  Continue diltiazem  mg daily  Continue chronic anticoagulation with warfarin  Repeat INR on March 6  Adjust warfarin dose for INR target of 2-3     History of PE in September 2012 (post right knee surgery).  Negative venous Doppler for DVT at that time.  Plan:  Continue chronic anticoagulation with warfarin as above     Hypothyroidism.   Last TSH was 1.58 on December 14, 2022.  Plan:  Continue levothyroxine 175 mcg  daily     BPH.  Plan:  Continue tamsulosin 0.4 mg daily     Prediabetes.  Last hemoglobin A1c was 5.9% on December 14, 2022.  Plan:  Monitor blood glucose PRN  Outpatient follow-up with PCP     Anxiety,  Depression.  Plan:  Continue venlafaxine 37.5 mg twice daily  Monitor symptoms    Slow transit constipation.  Plan:  Continue the bowel regimen     Obesity, BMI 33.3,  RIN, on BiPAP.  Plan:  Continue nocturnal BiPAP per home settings           Orders written by provider at facility:  BMP on March 3; DX: CKD        Disclaimer: This note may contain text created using speech-recognition software and may contain unintended word substitutions.      Electronically signed by:  Gabe Recinos MD                          Sincerely,        Gabe Recinos MD

## 2023-03-02 ENCOUNTER — DOCUMENTATION ONLY (OUTPATIENT)
Dept: ORTHOPEDICS | Facility: CLINIC | Age: 83
End: 2023-03-02
Payer: MEDICARE

## 2023-03-02 ENCOUNTER — LAB REQUISITION (OUTPATIENT)
Dept: LAB | Facility: CLINIC | Age: 83
End: 2023-03-02

## 2023-03-02 DIAGNOSIS — N18.9 CHRONIC KIDNEY DISEASE, UNSPECIFIED: ICD-10-CM

## 2023-03-02 NOTE — DISCHARGE SUMMARY
ORTHOPAEDIC SURGERY DISCHARGE SUMMARY     Date of Admission: 2/21/2023  Date of Discharge: 2/24/2023 12:24 AM  Disposition: Home  Staff Physician: Katelyn Vasquez  Primary Care Provider: Anne Heath    DISCHARGE DIAGNOSIS:  Left rotator cuff tear arthropathy [M75.102, M12.812]    PROCEDURES: Procedure(s):  ARTHROPLASTY, SHOULDER, TOTAL, REVERSE LEFT on 2/21/2023    BRIEF HISTORY:  This is a 82 year old patient who has been followed in clinic. Please refer to that documentation for full details. Briefly, the patient has a history of left cuff tear arthropathy. The patient has failed conservative treatment of symptoms and desires more definitive intervention. Therefore, after reviewing non-operative and operative options including the risks and benefits associated with each, the patient elected to proceed with the above stated procedure.     HOSPITAL COURSE:    The patient was admitted following the above listed procedures for pain control and rehabilitation. Erich Craven did well post-operatively. Medicine was consulted post operatively to aid in management of medical co-morbidities. The patient received routine nursing cares and at the time of discharge was medically stable. Vital signs were stable throughout admission. The patient is tolerating a regular diet and is voiding spontaneously. All PT/OT goals have been met for safe mobility. Pain is now controlled on oral medications which will be available on discharge. Stool softeners have been used while taking pain medications to help prevent constipation. Erich Craven is deemed medically safe to discharge.     Antibiotics:  Ancef given periop and 24 hours postop.  DVT prophylaxis:  Mechanical  PT Progress:  Has met PT/OT goals for safe mobility.    Pain Meds:  Weaned off all IV pain meds by discharge.  Inpatient Events: No significant events or complications.     PHYSICAL EXAM:    Gen: No acute distress, resting comfortably in bed.  Resp: Non-labored  breathing  Cardio: Regular rate via peripheral pulse  MSK:  UE:  - Dressings c/d/I  - Fires EPL, FPL, Intrinsics  - SILT medial/radial/ulnar/axillary nerves  - Radial pulse 2+, hand wwp    FOLLOWUP:    Follow up with Dr. Vasquez at 2 weeks postoperatively.    Future Appointments   Date Time Provider Department Center   3/8/2023  9:00 AM Katelyn Vasquez MD [facility]   4/5/2023  9:00 AM Katelyn Vasquez MD [facility]   5/23/2023 12:00 [facility]       Orthopaedic Surgery appointments are at the [facility] [place] ([address] [place] [address]). Call [phone] to schedule a follow-up appointment at this location with your provider.     PLANNED DISCHARGE ORDERS:     DVT Prophylaxis: Mechanical, ASA     Activity: NWB LUE     Wound Care: see Below         Review of your medicines      START taking      Dose / Directions   senna-docusate 8.6-50 MG tablet  Commonly known as: SENOKOT-S/PERICOLACE  Used for: Chronic left shoulder pain      Dose: 1 tablet  Take 1 tablet by mouth 2 times daily  Quantity: 30 tablet  Refills: 0        CONTINUE these medicines which have NOT CHANGED      Dose / Directions   ACETYL L-CARNITINE PO      Dose: 1 capsule  Take 1 capsule by mouth daily  Refills: 0     albuterol 108 (90 Base) MCG/ACT inhaler  Commonly known as: PROAIR HFA/PROVENTIL HFA/VENTOLIN HFA  Indication: Reversible Disease of Blockage in Breathing Passages      Dose: 2 puff  Inhale 2 puffs into the lungs 4 times daily as needed for shortness of breath / dyspnea or wheezing  Refills: 0     aspirin 81 MG chewable tablet  Commonly known as: ASA  Indication: Joint Damage causing Pain and Loss of Function      Dose: 1 tablet  Take 1 tablet by mouth daily  Refills: 0     atorvastatin 40 MG tablet  Commonly known as: LIPITOR  Indication: Cerebrovascular Accident or Stroke  Used for: Hyperlipidemia LDL goal <100      Dose: 40 mg  Take 1 tablet (40 mg) by mouth every  evening  Quantity: 90 tablet  Refills: 3     diltiazem ER COATED BEADS 300 MG 24 hr capsule  Commonly known as: CARDIZEM CD/CARTIA XT  Indication: Atrial Fibrillation  Used for: Essential hypertension      Dose: 300 mg  Take 1 capsule (300 mg) by mouth daily  Quantity: 90 capsule  Refills: 3     Fluticasone-Umeclidin-Vilanterol 200-62.5-25 MCG/INH oral inhaler  Commonly known as: TRELEGY ELLIPTA  Indication: Asthma      Dose: 1 puff  Inhale 1 puff into the lungs daily  Refills: 0     * furosemide 20 MG tablet  Commonly known as: LASIX  Indication: Cardiac Failure  Used for: Chronic diastolic heart failure (H)      Dose: 20 mg  Take 1 tablet (20 mg) by mouth every evening  Quantity: 30 tablet  Refills: 0     * furosemide 40 MG tablet  Commonly known as: LASIX  Indication: Cardiac Failure  Used for: Chronic diastolic heart failure (H)      Dose: 40 mg  Take 1 tablet (40 mg) by mouth every morning  Quantity: 90 tablet  Refills: 1     isosorbide mononitrate 30 MG 24 hr tablet  Commonly known as: IMDUR  Indication: Stable Angina Pectoris  Used for: Heart failure with preserved ejection fraction, unspecified HF chronicity (H)      Dose: 30 mg  Take 1 tablet (30 mg) by mouth daily  Quantity: 90 tablet  Refills: 3     levothyroxine 175 MCG tablet  Commonly known as: SYNTHROID/LEVOTHROID  Indication: Underactive Thyroid  Used for: Hypothyroidism, unspecified type      TAKE 1 TABLET(175 MCG) BY MOUTH DAILY  Quantity: 90 tablet  Refills: 2     lisinopril 20 MG tablet  Commonly known as: ZESTRIL  Indication: High Blood Pressure Disorder  Used for: Essential hypertension      Dose: 10 mg  Take 0.5 tablets (10 mg) by mouth daily  Quantity: 45 tablet  Refills: 3     metolazone 2.5 MG tablet  Commonly known as: ZAROXOLYN  Indication: Edema  Used for: Chronic diastolic heart failure (H)      TAKE 1 TABLET(2.5 MG) BY MOUTH TWICE DAILY  Quantity: 180 tablet  Refills: 2     Multiple vitamin Tabs      Dose: 1 tablet  Take 1 tablet by  mouth daily  Refills: 0     polyethylene glycol 17 g packet  Commonly known as: MIRALAX  Used for: Chronic left shoulder pain      Dose: 1 packet  Take 17 g by mouth daily  Quantity: 7 packet  Refills: 0     potassium chloride ER 10 MEQ CR tablet  Commonly known as: KLOR-CON M  Indication: Low Amount of Potassium in the Blood  Used for: Hypokalemia      TAKE 1 TABLET(10 MEQ) BY MOUTH DAILY  Quantity: 90 tablet  Refills: 0     spironolactone 25 MG tablet  Commonly known as: ALDACTONE  Indication: Edema  Used for: Lymphedema      Dose: 25 mg  Take 1 tablet (25 mg) by mouth every other day  Quantity: 45 tablet  Refills: 0     tamsulosin 0.4 MG capsule  Commonly known as: FLOMAX  Indication: Dysfunction of the Urinary Bladder  Used for: Hypothyroidism, unspecified type      Dose: 0.4 mg  Take 1 capsule (0.4 mg) by mouth daily  Quantity: 30 capsule  Refills: 0     TAURINE PO      Dose: 1 capsule  Take 1 capsule by mouth daily  Refills: 0     venlafaxine 75 MG tablet  Commonly known as: EFFEXOR  Indication: Major Depressive Disorder  Used for: Anxiety      Dose: 75 mg  Take 1 tablet (75 mg) by mouth 2 times daily  Quantity: 180 tablet  Refills: 1     warfarin ANTICOAGULANT 5 MG tablet  Commonly known as: COUMADIN  Indication: Atrial Fibrillation, Disease involving a Thrombosis or an Embolism  Used for: Chronic left shoulder pain, PE (pulmonary thromboembolism) (H), Ankle injury, right, initial encounter, Benign neoplasm of cerebral meninges (H), Long term current use of anticoagulants with INR goal of 2.0-3.0, Chronic right shoulder pain, Primary osteoarthritis of right shoulder, Stage 3a chronic kidney disease (H), Type 2 diabetes mellitus without complication, unspecified whether long term insulin use (H), Deep vein thrombosis (DVT) of distal vein of lower extremity, unspecified chronicity, unspecified laterality (H), Anxiety, Status post coronary angiogram, Heart failure with preserved ejection fraction, unspecified HF  chronicity (H), Muscle tension headache, Obesity, Class III, BMI 40-49.9 (morbid obesity) (H), Coronary artery disease involving native coronary artery with angina pectoris, unspecified whether native or transplanted heart (H), Cardiac pacemaker in situ, Essential hypertension, Lymphedema, Other hyperlipidemia, Paroxysmal A-fib (H), Hypothyroidism, unspecified type, Chronic bronchitis, unspecified chronic bronchitis type (H), RIN (obstructive sleep apnea), Fatigue, unspecified type, Shortness of breath, Chronic diastolic heart failure (H), Primary hypertension, Morbid obesity (H)      Take as directed. If you are unsure how to take this medication, talk to your nurse or doctor.  Original instructions: As of January 23, 2023 - take 7.5 mg every Wed, Sat; 5 mg all other days of the week. Takes in the morning.  Or as directed by the INR clinic. INR DUE for further refills.  Refills: 0         * This list has 2 medication(s) that are the same as other medications prescribed for you. Read the directions carefully, and ask your doctor or other care provider to review them with you.            STOP taking    acetaminophen 500 MG tablet  Commonly known as: TYLENOL        traMADol 50 MG tablet  Commonly known as: ULTRAM                   Discharge Procedure Orders   Reason for your hospital stay   Order Comments: S/p Left reverse total shoulder arthroplasty     Activity   Order Comments: Your activity upon discharge: activity as tolerated  There will be no shoulder range of motion for 4 weeks but the patient can begin immediate hand, wrist and elbow range of motion     Order Specific Question Answer Comments   Is discharge order? Yes      When to contact your care team   Order Comments: Call Dr. Vasquez  if you have any of the following: temperature greater than 101.3  or less than 96.5,  increased shortness of breath, increased drainage, increased swelling, or increased pain.    Contact phone number is       Wound care and dressings   Order Comments: Instructions to care for your wound at home: ice to area for comfort, keep wound clean and dry, may get incision wet in shower but do not soak or scrub, reinforce dressing as needed, and remove dressing in 7 days.     Adult [facility] Follow-up and recommended labs and tests   Order Comments: Follow up with Dr Vasquez as scheduled.      Appointments at [facility] at [address] [place] [address] ([facility])     Call [phone] if you haven't heard regarding these appointments within 7 days of discharge.     General info for SNF   Order Comments: Length of Stay Estimate: Short Term Care: Estimated # of Days <30  Condition at Discharge: Improving  Level of care:skilled   Rehabilitation Potential: Excellent  Admission H&P remains valid and up-to-date: Yes  Recent Chemotherapy: N/A  Use Nursing Home Standing Orders: Yes     Mantoux instructions   Order Comments: Give two-step Mantoux (PPD) Per Facility Policy Yes     Physical Therapy Adult Consult   Order Comments: Evaluate and treat as clinically indicated.    Reason:  s/p left shoulder surgery     Occupational Therapy Adult Consult   Order Comments: Evaluate and treat as clinically indicated.    Reason:  s/p left shoulder surgery     Diet   Order Comments: Follow this diet upon discharge: Orders Placed This Encounter      Advance Diet as Tolerated: Regular Diet Adult     Order Specific Question Answer Comments   Is discharge order? Yes        Gio Blankenship MD  Orthopaedic Surgery, PGY1

## 2023-03-03 LAB
ANION GAP SERPL CALCULATED.3IONS-SCNC: 12 MMOL/L (ref 7–15)
BUN SERPL-MCNC: 21.9 MG/DL (ref 8–23)
CALCIUM SERPL-MCNC: 9.6 MG/DL (ref 8.8–10.2)
CHLORIDE SERPL-SCNC: 96 MMOL/L (ref 98–107)
CREAT SERPL-MCNC: 0.93 MG/DL (ref 0.67–1.17)
DEPRECATED HCO3 PLAS-SCNC: 28 MMOL/L (ref 22–29)
GFR SERPL CREATININE-BSD FRML MDRD: 81 ML/MIN/1.73M2
GLUCOSE SERPL-MCNC: 148 MG/DL (ref 70–99)
POTASSIUM SERPL-SCNC: 3.2 MMOL/L (ref 3.4–5.3)
SODIUM SERPL-SCNC: 136 MMOL/L (ref 136–145)

## 2023-03-03 PROCEDURE — P9604 ONE-WAY ALLOW PRORATED TRIP: HCPCS | Performed by: NURSE PRACTITIONER

## 2023-03-03 PROCEDURE — 36415 COLL VENOUS BLD VENIPUNCTURE: CPT | Performed by: NURSE PRACTITIONER

## 2023-03-03 PROCEDURE — 82310 ASSAY OF CALCIUM: CPT | Performed by: NURSE PRACTITIONER

## 2023-03-03 NOTE — PROGRESS NOTES
Prior Authorization **APPROVED**    Authorization Effective Date: 2/27/2023  Authorization Expiration Date: 2/27/2024  Medication: Oxycodone 5mg tabs **APPROVED**  Approved Dose/Quantity: 26 tabs/5 days  Reference #: Monica Key: [id number], PA #: [id number]   Insurance Company: [employer] - Phone [phone] Fax [phone]  Expected CoPay: $0.50     CoPay Card Available: No    Foundation Assistance Needed: n/a  Which Pharmacy is filling the prescription (Not needed for infusion/clinic administered): [facility] [place], [place] - [address]  Pharmacy Notified: Yes  Patient Notified: Yes  Comments:  Plan limits to #42 tabs per 90 days. PA required for quantities beyond these limits. Discharge pharmacy sent patient with supply while auth is pending. **Retroactively Billed**        Nayely Schaffer CPhT  [place] [facility]charge Pharm[facility] Liaison  Pronouns: She/Her/Hers    [facility]  [address]  [address] [place] [address]   [email or web address]  [email or web address]   Phone: [phone]  Pager: [phone]  Fax: [phone]

## 2023-03-05 VITALS
WEIGHT: 247.8 LBS | OXYGEN SATURATION: 95 % | BODY MASS INDEX: 33.56 KG/M2 | SYSTOLIC BLOOD PRESSURE: 115 MMHG | HEIGHT: 72 IN | TEMPERATURE: 97.6 F | DIASTOLIC BLOOD PRESSURE: 67 MMHG | HEART RATE: 72 BPM | RESPIRATION RATE: 18 BRPM

## 2023-03-06 ENCOUNTER — TRANSITIONAL CARE UNIT VISIT (OUTPATIENT)
Dept: GERIATRICS | Facility: CLINIC | Age: 83
End: 2023-03-06
Payer: MEDICARE

## 2023-03-06 ENCOUNTER — LAB REQUISITION (OUTPATIENT)
Dept: LAB | Facility: CLINIC | Age: 83
End: 2023-03-06

## 2023-03-06 DIAGNOSIS — Z79.01 LONG TERM CURRENT USE OF ANTICOAGULANTS WITH INR GOAL OF 2.0-3.0: ICD-10-CM

## 2023-03-06 DIAGNOSIS — I50.32 CHRONIC DIASTOLIC HEART FAILURE (H): ICD-10-CM

## 2023-03-06 DIAGNOSIS — I50.32 CHRONIC DIASTOLIC (CONGESTIVE) HEART FAILURE (H): ICD-10-CM

## 2023-03-06 DIAGNOSIS — E66.01 MORBID OBESITY (H): Primary | ICD-10-CM

## 2023-03-06 DIAGNOSIS — N39.0 URINARY TRACT INFECTION, SITE NOT SPECIFIED: ICD-10-CM

## 2023-03-06 DIAGNOSIS — I50.32 CHRONIC HEART FAILURE WITH PRESERVED EJECTION FRACTION (H): ICD-10-CM

## 2023-03-06 DIAGNOSIS — E87.6 HYPOKALEMIA: ICD-10-CM

## 2023-03-06 LAB
ALBUMIN UR-MCNC: NEGATIVE MG/DL
APPEARANCE UR: CLEAR
BILIRUB UR QL STRIP: NEGATIVE
COLOR UR AUTO: ABNORMAL
GLUCOSE UR STRIP-MCNC: NEGATIVE MG/DL
HGB UR QL STRIP: NEGATIVE
INR (EXTERNAL): 2.1 (ref 0.9–1.1)
KETONES UR STRIP-MCNC: NEGATIVE MG/DL
LEUKOCYTE ESTERASE UR QL STRIP: NEGATIVE
MUCOUS THREADS #/AREA URNS LPF: PRESENT /LPF
NITRATE UR QL: NEGATIVE
PH UR STRIP: 6.5 [PH] (ref 5–7)
RBC URINE: 0 /HPF
SP GR UR STRIP: 1 (ref 1–1.03)
UROBILINOGEN UR STRIP-MCNC: NORMAL MG/DL
WBC URINE: 0 /HPF

## 2023-03-06 PROCEDURE — 87086 URINE CULTURE/COLONY COUNT: CPT | Performed by: NURSE PRACTITIONER

## 2023-03-06 PROCEDURE — 99309 SBSQ NF CARE MODERATE MDM 30: CPT | Performed by: NURSE PRACTITIONER

## 2023-03-06 PROCEDURE — 81001 URINALYSIS AUTO W/SCOPE: CPT | Performed by: NURSE PRACTITIONER

## 2023-03-06 NOTE — PROGRESS NOTES
[facility] GERIATRICS    Chief Complaint   Patient presents with     RECHECK     HPI:  Erich Craven is a 82 year old  (1940), who is being seen today for an episodic care visit at: [facility] ([facility]) [[id number]].     This is a 82-year-old male with past medical history of heart failure preserved EF, coronary disease, A-fib on Coumadin, hypertension, hyperlipidemia, BPH, COPD, hypothyroidism who presented for an elective left reverse shoulder arthroplasty.Postoperatively required 3.5 L supplemental oxygen, uses BiPAP at home, given sling and nonweightbearing on LUE, continued on chronic oxycodone for pain control and sustained mild ABLA with discharge hemoglobin 11.1.  No other changes noted, discharged to [facility] for rehab.    Today's concern is:   CHF, pain control, constipation, Coumadin dosing, therapy progress    Allergies, and PMH/PSH reviewed in [facility] today.  REVIEW OF SYSTEMS:  4 point ROS including Respiratory, CV, GI and , other than that noted in the HPI,  is negative    Objective:   /67   Pulse 72   Temp 97.6  F (36.4  C)   Resp 18   Ht 1.829 m (6')   Wt 112.4 kg (247 lb 12.8 oz)   SpO2 95%   BMI 33.61 kg/m    GENERAL APPEARANCE:  Alert, oriented, morbidly obese, cooperative, chronic pain improved  RESP:  no respiratory distress, diminished breath sounds bilaterally, RA  CV:  peripheral edema 1-2+ in BLE, IRR for rate controlled A-fib  ABDOMEN:  normal bowel sounds, soft, nontender, no hepatosplenomegaly or other masses.  Has constipation  M/S:   NWB on LUE  PSYCH:  oriented X 3, memory impaired.     Labs  See below    Assessment/Plan:    Left shoulder arthroplasty  Right shoulder arthroplasty (H)  Given nonweightbearing on LUE, using sling, follow-up with Ortho on 3/8 per Dr. Vasquez.  No change     Chronic pain  Using Tylenol 1000 mg 3 times daily, today change oxycodone to 5 every 4 hours as needed continue lidocaine patch and encourage icing     (I50.32) Chronic heart  failure with preserved ejection fraction (H)  Continue Lasix 40 mg in a.m. and 20 mg in afternoon, metolazone 2.5 mg twice daily and spironolactone 25 mg EOD. daily weights.  246lbs (-3lbs)    Lymphedema  Therapy to evaluate     (I48.0) Paroxysmal A-fib (H)  Last INR 2.1, continue Coumadin 7.5 mg W/SAT and 5 mg AOD, recheck INR on 3/13     (I10) Primary hypertension  (I48.0) Paroxysmal A-fib (H)  Continue isosorbide monohydrate 30 mg daily, lisinopril 10 mg daily, and diltiazem 300 mg daily. -130s, HR <80s     (E66.01) Morbid obesity (H)   Last BMI 33.6     (I25.119) Coronary artery disease involving native coronary artery with angina pectoris, unspecified whether native or transplanted heart (H)  Continue ASA 81 mg daily and statin     (N18.32) Stage 3b chronic kidney disease (H)  Last CR 0.93 with GFR >80 on 2/28     Hypokalemia  Last K 3.2 on 2/28, increase potassium to 20 mEq daily, recheck BMP on 3/7     Anemia  Last Hgb 10.4 on 2/28     (G47.33) RIN (obstructive sleep apnea)  (J42) Chronic bronchitis, unspecified chronic bronchitis   Continue Trelegy elliptica 200/62.5/25 mcg 1 puff daily and NIPPV, weaned off supplemental oxygen, room air     (E03.9) Hypothyroidism, unspecified type  Continue Synthroid at 175 mcg daily, last TSH 3.07 on 10/13/2022     Constipation  Today increase MiraLAX to twice daily and continue senna S 1 tab twice daily     BPH  Continue tamsulosin 0.4 mg daily, reports frequency, requested UA/UC     Depression  Continue venlafaxine 75 mg twice daily, stable    Therapy  Ambulating 130 feet with SEC, SBA    Orders:  Coumadin dosing, lymphedema, UA/UC, increase MiraLAX, decrease oxycodone    Electronically signed by: Erich Meade NP

## 2023-03-07 LAB
ANION GAP SERPL CALCULATED.3IONS-SCNC: 10 MMOL/L (ref 7–15)
BACTERIA UR CULT: NORMAL
BUN SERPL-MCNC: 20.7 MG/DL (ref 8–23)
CALCIUM SERPL-MCNC: 9.5 MG/DL (ref 8.8–10.2)
CHLORIDE SERPL-SCNC: 99 MMOL/L (ref 98–107)
CREAT SERPL-MCNC: 0.93 MG/DL (ref 0.67–1.17)
DEPRECATED HCO3 PLAS-SCNC: 30 MMOL/L (ref 22–29)
GFR SERPL CREATININE-BSD FRML MDRD: 81 ML/MIN/1.73M2
GLUCOSE SERPL-MCNC: 94 MG/DL (ref 70–99)
POTASSIUM SERPL-SCNC: 3.8 MMOL/L (ref 3.4–5.3)
SODIUM SERPL-SCNC: 139 MMOL/L (ref 136–145)

## 2023-03-07 PROCEDURE — 80048 BASIC METABOLIC PNL TOTAL CA: CPT | Performed by: NURSE PRACTITIONER

## 2023-03-07 PROCEDURE — P9604 ONE-WAY ALLOW PRORATED TRIP: HCPCS | Performed by: NURSE PRACTITIONER

## 2023-03-07 PROCEDURE — 36415 COLL VENOUS BLD VENIPUNCTURE: CPT | Performed by: NURSE PRACTITIONER

## 2023-03-08 ENCOUNTER — OFFICE VISIT (OUTPATIENT)
Dept: ORTHOPEDICS | Facility: CLINIC | Age: 83
End: 2023-03-08
Payer: MEDICARE

## 2023-03-08 DIAGNOSIS — Z96.612 STATUS POST REVERSE ARTHROPLASTY OF SHOULDER, LEFT: Primary | ICD-10-CM

## 2023-03-08 PROCEDURE — 99024 POSTOP FOLLOW-UP VISIT: CPT | Mod: GC | Performed by: ORTHOPAEDIC SURGERY

## 2023-03-08 NOTE — PROGRESS NOTES
Procedure: Left reverse total shoulder arthroplasty  DOS: 2/21/2023    HISTORY OF PRESENT ILLNESS:  Erich Craven is a 82 year old male who underwent the above procedure, and is presenting to clinic today with his son-in-law for routine follow-up.  Patient discharged from the hospital to [place].  He reports that he has been doing well at the TCU.  He has been engaged in therapy.  Due to his postoperative restrictions, they have predominantly been working on his lower extremity strength, and right upper extremity function.  He reports that he has been adherent with sling wear, and his postoperative restrictions.  He denies any issues with pain control.  Patient denies associated neurovascular changes.    Overall, patient is progressing well following his surgical intervention. Patient anticipates discharging from his TCU back to home in approximately 1 week.    He and his son-in-law would like to review his current progress, and the next steps in his care; they have no additional questions or concerns for today's visit    PHYSICAL EXAM:  General: Alert, oriented, no acute distress, answers questions appropriately  Pulmonary: Breathing comfortably on room air  Cardiovascular: Warm and well-perfused  LUE:  -Surgical incision is well-healing without concern for associated infection  -Able to perform hand, wrist, and elbow range of motion without difficulty  -Shoulder ROM not evaluated secondary to postoperative restrictions  -Distally neurovascularly intact    IMAGING:  No new imaging was obtained for today's evaluation    ASSESSMENT:  1) status post left reverse total shoulder arthroplasty on 2/21/2023    PLAN:    Range of Motion: Okay for ROM hand, wrist, and elbow.  Continue to hold shoulder ROM exercises until 6 weeks postop.    Sling: Continue sling except for bathing/dressing/rehab.  Okay to remove sling when at rest.    Pain medication: NSAIDs and Tylenol PRN    Follow up: in 4 weeks.  Will  discontinue sling at that time and initiate AROM.     Maxine Campuzano MD  Orthopaedic Surgery, PGY-5

## 2023-03-08 NOTE — NURSING NOTE
Reason For Visit:   Chief Complaint   Patient presents with     Surgical Followup     2 week post op DOS: 2/21/23 Left reverse TSA        PCP: Anne Heath  Ref:      ?  No  Occupation Retired .  Currently working? No.  Work status?  Retired.  Date of injury: NA  Type of injury: NA.  Date of surgery: 2/21/23  Type of surgery: Left reverse total shoulder arthroplasty  Smoker: No  Request smoking cessation information: No     Right hand dominant    SANE score  Affected shoulder: Right  Right shoulder SANE: 100  Left shoulder SANE: 0    There were no vitals taken for this visit.      Pain Assessment  Patient Currently in Pain: Tika Alexander, ATC

## 2023-03-08 NOTE — LETTER
3/8/2023         RE: Erich Craven  [address]  [place] [address]        Dear Colleague,    Thank you for referring your patient, Erich Craven, to the [facility] [place]. Please see a copy of my visit note below.    Procedure: Left reverse total shoulder arthroplasty  DOS: 2/21/2023    HISTORY OF PRESENT ILLNESS:  Erich Craven is a 82 year old male who underwent the above procedure, and is presenting to clinic today with his son-in-law for routine follow-up.  Patient discharged from the hospital to [place] [facility] [address].  He reports that he has been doing well at the TCU.  He has been engaged in therapy.  Due to his postoperative restrictions, they have predominantly been working on his lower extremity strength, and right upper extremity function.  He reports that he has been adherent with sling wear, and his postoperative restrictions.  He denies any issues with pain control.  Patient denies associated neurovascular changes.    Overall, patient is progressing well following his surgical intervention. Patient anticipates discharging from his TCU back to home in approximately 1 week.    He and his son-in-law would like to review his current progress, and the next steps in his care; they have no additional questions or concerns for today's visit    PHYSICAL EXAM:  General: Alert, oriented, no acute distress, answers questions appropriately  Pulmonary: Breathing comfortably on room air  Cardiovascular: Warm and well-perfused  LUE:  -Surgical incision is well-healing without concern for associated infection  -Able to perform hand, wrist, and elbow range of motion without difficulty  -Shoulder ROM not evaluated secondary to postoperative restrictions  -Distally neurovascularly intact    IMAGING:  No new imaging was obtained for today's evaluation    ASSESSMENT:  1) status post left reverse total shoulder arthroplasty on 2/21/2023    PLAN:    Range of Motion: Okay  for ROM hand, wrist, and elbow.  Continue to hold shoulder ROM exercises until 6 weeks postop.    Sling: Continue sling except for bathing/dressing/rehab.  Okay to remove sling when at rest.    Pain medication: NSAIDs and Tylenol PRN    Follow up: in 4 weeks.  Will discontinue sling at that time and initiate AROM.     Maxine Campuzano MD  Orthopaedic Surgery, PGY-5       Katelyn Vasquez MD

## 2023-03-13 ENCOUNTER — TELEPHONE (OUTPATIENT)
Dept: FAMILY MEDICINE | Facility: CLINIC | Age: 83
End: 2023-03-13
Payer: MEDICARE

## 2023-03-13 VITALS
HEIGHT: 72 IN | OXYGEN SATURATION: 95 % | DIASTOLIC BLOOD PRESSURE: 67 MMHG | SYSTOLIC BLOOD PRESSURE: 109 MMHG | TEMPERATURE: 98.2 F | WEIGHT: 253.8 LBS | BODY MASS INDEX: 34.38 KG/M2 | HEART RATE: 73 BPM | RESPIRATION RATE: 18 BRPM

## 2023-03-13 LAB — INR (EXTERNAL): 2.4 (ref 0.9–1.1)

## 2023-03-13 NOTE — TELEPHONE ENCOUNTER
Cheri LOCO form [facility] called asking if Dr. Heath will follow for HC orders? Pt is currently in a TCU and plan to discharge tomorrow.       Routing to PCP to review and advise.     Please call Cheri back with PCPs recommendations.     Cheri #- [phone] (Can leave a detailed message)

## 2023-03-14 ENCOUNTER — DISCHARGE SUMMARY NURSING HOME (OUTPATIENT)
Dept: GERIATRICS | Facility: CLINIC | Age: 83
End: 2023-03-14
Payer: MEDICARE

## 2023-03-14 DIAGNOSIS — E66.01 MORBID OBESITY (H): Primary | ICD-10-CM

## 2023-03-14 DIAGNOSIS — G89.29 CHRONIC LEFT SHOULDER PAIN: ICD-10-CM

## 2023-03-14 DIAGNOSIS — I10 PRIMARY HYPERTENSION: ICD-10-CM

## 2023-03-14 DIAGNOSIS — J42 CHRONIC BRONCHITIS, UNSPECIFIED CHRONIC BRONCHITIS TYPE (H): ICD-10-CM

## 2023-03-14 DIAGNOSIS — M25.512 CHRONIC LEFT SHOULDER PAIN: ICD-10-CM

## 2023-03-14 DIAGNOSIS — I50.32 CHRONIC DIASTOLIC HEART FAILURE (H): ICD-10-CM

## 2023-03-14 DIAGNOSIS — I48.0 PAROXYSMAL A-FIB (H): ICD-10-CM

## 2023-03-14 PROCEDURE — 99316 NF DSCHRG MGMT 30 MIN+: CPT | Performed by: NURSE PRACTITIONER

## 2023-03-14 RX ORDER — OXYCODONE HYDROCHLORIDE 5 MG/1
5 TABLET ORAL EVERY 6 HOURS PRN
Qty: 10 TABLET | Refills: 0 | Status: SHIPPED | OUTPATIENT
Start: 2023-03-14 | End: 2023-04-13

## 2023-03-14 NOTE — TELEPHONE ENCOUNTER
Appointments in Next Year    Mar 14, 2023  9:00 AM  Discharge Summary with Erich Meade NP  [facility] ([facility] ) [phone] Apr 19, 2023  8:30 AM  (Arrive by 8:15 AM)  ED/Hospital Follow Up with Anne Heath MD  [facility] ([facility] ) [phone]     Called back to Cheri, there are no other providers following patient - pt discharged today from TCU - they will send HC orders to PCP to sign    Keisha MARTIN Triage RN  [facility]

## 2023-03-14 NOTE — PROGRESS NOTES
[facility] GERIATRICS DISCHARGE SUMMARY  PATIENT'S NAME: Erich Craven  YOB: 1940  MEDICAL RECORD NUMBER:  [id number]  Place of Service where encounter took place:  [facility] (TCU) [[id number]]    PRIMARY CARE PROVIDER AND CLINIC RESPONSIBLE AFTER TRANSFER:   Anne Heath MD, [address] / [place] [address]    [facility] [place] Provider     Transferring providers: Erich Meade NP, Dr. Jorje MD  Recent Hospitalization/ED:  [facility] stay 2/21/23 to 2/24/23.  Date of SNF Admission: 2/24/23  Date of SNF (anticipated) Discharge: March 15, 2023  Discharged to: previous assisted living  Cognitive Scores: SLUMS: 21/30  Physical Function: Ambulating >100 ft with FWW  DME: No new DME needed    CODE STATUS/ADVANCE DIRECTIVES DISCUSSION:  Prior   ALLERGIES: No known allergies    HPI  This is a 82-year-old male with past medical history of heart failure preserved EF, coronary disease, A-fib on Coumadin, hypertension, hyperlipidemia, BPH, COPD, hypothyroidism who presented for an elective left reverse shoulder arthroplasty.Postoperatively required 3.5 L supplemental oxygen, uses BiPAP at home, given sling and nonweightbearing on LUE, continued on chronic oxycodone for pain control and sustained mild ABLA with discharge hemoglobin 11.1.  No other changes noted, discharged to [facility] for rehab.    Summary of nursing facility stay:     Left shoulder arthroplasty  Right shoulder arthroplasty (H)  Given nonweightbearing on LUE, using sling, follow-up with Ortho (TBD)     Chronic pain  Using Tylenol 1000 mg 3 times daily, oxycodone to 5 every 4 hours as needed continue lidocaine patch and encourage icing. Will send 10 tabs at DC     (I50.32) Chronic heart failure with preserved ejection fraction (H)  Continue Lasix 40 mg in a.m. and 20 mg in afternoon, metolazone 2.5 mg twice daily and spironolactone 25 mg EOD. daily weights  encouraged     Lymphedema  Continue therapy with HC     (I48.0) Paroxysmal A-fib (H)  Last INR 2.4, continue Coumadin 7.5 mg W/SAT and 5 mg AOD, recheck INR on 3/20     (I10) Primary hypertension  (I48.0) Paroxysmal A-fib (H)  Continue isosorbide monohydrate 30 mg daily, lisinopril 10 mg daily, and diltiazem 300 mg daily. -130s, HR <80s     (E66.01) Morbid obesity (H)   Last BMI 33.6     (I25.119) Coronary artery disease involving native coronary artery with angina pectoris, unspecified whether native or transplanted heart (H)  Continue ASA 81 mg daily and statin     (N18.32) Stage 3b chronic kidney disease (H)  Last CR 0.93 with GFR >80 on 3/7     Hypokalemia  Last K 3.8 on 3/7, continue potassium 20 mEq daily     Anemia  Last Hgb 10.4 on 2/28     (G47.33) RIN (obstructive sleep apnea)  (J42) Chronic bronchitis, unspecified chronic bronchitis   Continue Trelegy elliptica 200/62.5/25 mcg 1 puff daily and NIPPV, weaned off supplemental oxygen, room air     (E03.9) Hypothyroidism, unspecified type  Continue Synthroid at 175 mcg daily, last TSH 3.07 on 10/13/2022     Constipation  MiraLAX twice daily and continue senna S 1 tab twice daily     BPH  Continue tamsulosin 0.4 mg daily, reports frequency, last UA/UC negative     Depression  Continue venlafaxine 75 mg twice daily    Discharge Medications:    Current Outpatient Medications   Medication Sig Dispense Refill     acetaminophen (TYLENOL) 500 MG tablet Take 1,000 mg by mouth 3 times daily       Acetylcarnitine HCl (ACETYL L-CARNITINE PO) Take 1 capsule by mouth daily (Patient not taking: Reported on 2/27/2023)       albuterol (PROAIR HFA/PROVENTIL HFA/VENTOLIN HFA) 108 (90 Base) MCG/ACT inhaler Inhale 2 puffs into the lungs 4 times daily as needed for shortness of breath / dyspnea or wheezing       aspirin (ASA) 81 MG chewable tablet Take 1 tablet by mouth daily       atorvastatin (LIPITOR) 40 MG tablet Take 1 tablet (40 mg) by mouth every evening 90 tablet 3      diltiazem ER COATED BEADS (CARDIZEM CD/CARTIA XT) 300 MG 24 hr capsule Take 1 capsule (300 mg) by mouth daily 90 capsule 3     Fluticasone-Umeclidin-Vilanterol (TRELEGY ELLIPTA) 200-62.5-25 MCG/INH oral inhaler Inhale 1 puff into the lungs daily       furosemide (LASIX) 20 MG tablet Take 1 tablet (20 mg) by mouth every evening 30 tablet 0     furosemide (LASIX) 40 MG tablet Take 1 tablet (40 mg) by mouth every morning 90 tablet 1     isosorbide mononitrate (IMDUR) 30 MG 24 hr tablet Take 1 tablet (30 mg) by mouth daily 90 tablet 3     levothyroxine (SYNTHROID/LEVOTHROID) 175 MCG tablet TAKE 1 TABLET(175 MCG) BY MOUTH DAILY 90 tablet 2     lisinopril (ZESTRIL) 20 MG tablet Take 0.5 tablets (10 mg) by mouth daily 45 tablet 3     metolazone (ZAROXOLYN) 2.5 MG tablet TAKE 1 TABLET(2.5 MG) BY MOUTH TWICE DAILY 180 tablet 2     Multiple vitamin TABS Take 1 tablet by mouth daily       oxyCODONE (ROXICODONE) 5 MG tablet Take 1-2 tablets (5-10 mg) by mouth every 4 hours as needed for moderate pain (4-6) (Patient taking differently: Take 5 mg by mouth every 6 hours as needed for severe pain (7-10)) 26 tablet 0     polyethylene glycol (MIRALAX) 17 g packet Take 17 g by mouth daily (Patient taking differently: Take 1 packet by mouth 2 times daily) 7 packet 0     potassium chloride ER (KLOR-CON M) 10 MEQ CR tablet TAKE 1 TABLET(10 MEQ) BY MOUTH DAILY (Patient taking differently: 20 mEq daily) 90 tablet 0     senna-docusate (SENOKOT-S/PERICOLACE) 8.6-50 MG tablet Take 1 tablet by mouth 2 times daily 30 tablet 0     spironolactone (ALDACTONE) 25 MG tablet Take 1 tablet (25 mg) by mouth every other day 45 tablet 0     tamsulosin (FLOMAX) 0.4 MG capsule Take 1 capsule (0.4 mg) by mouth daily 30 capsule 0     TAURINE PO Take 1 capsule by mouth daily (Patient not taking: Reported on 2/27/2023)       venlafaxine (EFFEXOR) 75 MG tablet Take 1 tablet (75 mg) by mouth 2 times daily 180 tablet 1     warfarin ANTICOAGULANT (COUMADIN) 5  MG tablet As of January 23, 2023 - take 7.5 mg every Wed, Sat; 5 mg all other days of the week. Takes in the morning.  Or as directed by the INR clinic. INR DUE for further refills. (Patient taking differently: take 7.5 mg every Wed, Sat; 5 mg all other days of the week. Recheck INR on 3/20)       Controlled medications:   Medication oxycodone 5mg - 10 tabs electronically prescribed to [facility] pharmacy     Past Medical History:   Past Medical History:   Diagnosis Date     (HFpEF) heart failure with preserved ejection fraction (H)      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 Started in 2012     Hypothyroidism      Lymphedema      Mumps      Obesity, Class III, BMI 40-49.9 (morbid obesity) (H)      RIN (obstructive sleep apnea)     has refused CPAP     Pacemaker      Paroxysmal A-fib (H)      PE (pulmonary thromboembolism) (H)      Physical Exam:   Vitals: /67   Pulse 73   Temp 98.2  F (36.8  C)   Resp 18   Ht 1.829 m (6')   Wt 115.1 kg (253 lb 12.8 oz)   SpO2 95%   BMI 34.42 kg/m    BMI: Body mass index is 34.42 kg/m .  GENERAL APPEARANCE:  Alert, oriented, morbidly obese, cooperative, chronic pain improved  RESP:  no respiratory distress, diminished breath sounds bilaterally, RA  CV:  peripheral edema 1-2+ in BLE, IRR for rate controlled A-fib, PPM  M/S:   NWB on LUE  PSYCH:  oriented X 3, memory impaired    SNF labs: see above    DISCHARGE PLAN:    Follow up labs: INR due 3/20 to be drawn by  home care with results to PCP    Medical Follow Up:      Follow up with primary care provider in 1-2 weeks    [facility] scheduled appointments:  Next 5 appointments (look out 90 days)    Apr 13, 2023 10:00 AM  Pharmacist Visit with Kassi Glez PharmD  [facility] ([facility] ) [address] [address]  [phone]           Discharge Services: Home Care:  Occupational Therapy, Physical Therapy, Registered Nurse and Home Health Aide    Discharge Instructions Verbalized to Patient at Discharge:     Weight bearing restrictions:  Non-weight bearing.     TOTAL DISCHARGE TIME:   Greater than 30 minutes  Electronically signed by:  Erich Meade NP     Documentation of Face to Face and Certification for Home Health Services    I certify that patient: Erich Craven is under my care and that I, or a nurse practitioner or physician's assistant working with me, had a face-to-face encounter that meets the physician face-to-face encounter requirements with this patient on: 3/14/2023.    This encounter with the patient was in whole, or in part, for the following medical condition, which is the primary reason for home health care: medication management and INR check.    I certify that, based on my findings, the following services are medically necessary home health services: Nursing, Occupational Therapy and Physical Therapy.    My clinical findings support the need for the above services because: Nurse is needed: To provide assessment and oversight required in the home to assure adherence to the medical plan due to: medication management, INR check and lymphedema tx.. and Occupational Therapy Services are needed to assess and treat cognitive ability and address ADL safety due to impairment in cognition. Physical Therapy per falls risk.    Further, I certify that my clinical findings support that this patient is homebound (i.e. absences from  home require considerable and taxing effort and are for medical reasons or [facility] services or infrequently or of short duration when for other reasons) because: Requires assistance of another person or specialized equipment to access medical services because patient: Is unable to operate assistive equipment on their own. and Requires supervision of another for safe transfer...    Based on the above findings. I certify that this patient is confined to the home and needs intermittent skilled nursing care, physical therapy and/or speech therapy.  The patient is under my care, and I have initiated the establishment of the plan of care.  This patient will be followed by a physician who will periodically review the plan of care.  Physician/Provider to provide follow up care: Anne Heath    Attending hospital physician (the Medicare certified [facility] provider): Erich Meade NP  Physician Signature: See electronic signature associated with these discharge orders.  Date: 3/14/2023             Anterior

## 2023-03-17 ENCOUNTER — TELEPHONE (OUTPATIENT)
Dept: FAMILY MEDICINE | Facility: CLINIC | Age: 83
End: 2023-03-17
Payer: MEDICARE

## 2023-03-17 NOTE — TELEPHONE ENCOUNTER
Received call from TONI Medina from [facility]. Requesting continuation of OT orders.     Requesting 1/week for 2 weeks, once every other week for 3 weeks. Reason: therapeutic exercise and activity after L shoulder arthoplasty.     verbal orders given per RN protocol.     Home care to fax requested orders to provider for review.     Nora Roach RN

## 2023-03-17 NOTE — TELEPHONE ENCOUNTER
Home care PT called     For home care PT orders:     2x/2 weeks   1x/4 weeks     Verbal given     Appointments in Next Year    Apr 05, 2023  9:00 AM  (Arrive by 8:45 AM)  Post-Op Visit with Katelyn Vasquez MD  [facility] [place] ([facility] ) [phone]   Apr 13, 2023 10:00 AM  Pharmacist Visit with Kassi Glez PharmD  [facility] ([facility] ) [phone]   Apr 19, 2023  8:30 AM  (Arrive by 8:15 AM)  ED/Hospital Follow Up with Anne Heath MD  [facility] ([facility] ) [phone]   May 23, 2023 12:00 AM  CARDIAC DEVICE CHECK - REMOTE with SANDOVAL TECH1  [facility] ([facility] ) [phone]   Jul 25, 2023  8:00 AM  LAB with SAND[facility] LAB  [facility] ([facility] ) [phone]   Jul 26, 2023  8:15 AM  (Arrive by 8:10 AM)  Return Cardiology with Brent Portillo MD  [facility] ([facility] ) [phone]        Jessica Gutierrez RN  [facility]

## 2023-03-20 ENCOUNTER — TELEPHONE (OUTPATIENT)
Dept: FAMILY MEDICINE | Facility: CLINIC | Age: 83
End: 2023-03-20
Payer: MEDICARE

## 2023-03-20 ENCOUNTER — ANTICOAGULATION THERAPY VISIT (OUTPATIENT)
Dept: ANTICOAGULATION | Facility: CLINIC | Age: 83
End: 2023-03-20
Payer: MEDICARE

## 2023-03-20 DIAGNOSIS — I26.99 PE (PULMONARY THROMBOEMBOLISM) (H): Primary | ICD-10-CM

## 2023-03-20 DIAGNOSIS — I48.0 PAROXYSMAL A-FIB (H): ICD-10-CM

## 2023-03-20 DIAGNOSIS — I82.4Z9 DEEP VEIN THROMBOSIS (DVT) OF DISTAL VEIN OF LOWER EXTREMITY, UNSPECIFIED CHRONICITY, UNSPECIFIED LATERALITY (H): ICD-10-CM

## 2023-03-20 DIAGNOSIS — Z79.01 LONG TERM CURRENT USE OF ANTICOAGULANTS WITH INR GOAL OF 2.0-3.0: ICD-10-CM

## 2023-03-20 LAB — INR (EXTERNAL): 2.3 (ref 0.9–1.1)

## 2023-03-20 NOTE — TELEPHONE ENCOUNTER
Nazario RN with [facility] calling to request Verbal Orders for Skilled Nursing one time a week for 8 weeks with 3 PRN visits. Nazario stated initial evaluation was already conducted. Per protocol details, RN gave verbal approval of requested orders.

## 2023-03-20 NOTE — PROGRESS NOTES
ANTICOAGULATION MANAGEMENT     Erich Craven 82 year old male is on warfarin with therapeutic INR result. (Goal INR 2.0-3.0)    Recent labs: (last 7 days)     03/20/23  1037   INR 2.3*       ASSESSMENT       Source(s): Chart Review and Home Care/Facility Nurse       Warfarin doses taken: Warfarin taken as instructed    Diet: No new diet changes identified    New illness, injury, or hospitalization: Yes: Total shoulder arthroplasty 02/21/2023. Hospitalized 02/21-02/24. TCU 02/24-03/15.    Medication/supplement changes: Tylenol 3,000mg/day. Pt was on this prior to surgery, not new.    Signs or symptoms of bleeding or clotting: No    Previous INR: Therapeutic last 2(+) visits    Additional findings: None         PLAN     Recommended plan for no diet, medication or health factor changes affecting INR     Dosing Instructions: Continue your current warfarin dose with next INR in 2 weeks       Summary  As of 3/20/2023    Full warfarin instructions:  7.5 mg every Wed, Sat; 5 mg all other days   Next INR check:  4/3/2023             Telephone call with [facility] home care nurse who verbalizes understanding and agrees to plan    Orders given to  Homecare nurse/facility to recheck    Education provided:     Goal range and lab monitoring: goal range and significance of current result    Plan made per [facility] anticoagulation protocol    Raphael Wright RN  Anticoagulation Clinic  3/20/2023    _______________________________________________________________________     Anticoagulation Episode Summary     Current INR goal:  2.0-3.0   TTR:  42.7 % (1 y)   Target end date:  Indefinite   Send INR reminders to:  [facility]    Indications    PE (pulmonary thromboembolism) (H) [I26.99]  Paroxysmal A-fib (H) [I48.0]  Deep vein thrombosis (DVT) of distal vein of lower extremity  unspecified chronicity  unspecified laterality (H) [I82.4Z9]  Long term current use of anticoagulants with INR goal of 2.0-3.0 [Z79.01]           Comments:  Back to  [facility] when finished with home care         Anticoagulation Care Providers     Provider Role Specialty Phone number    Dontrell Gómez MD Referring Internal Medicine [phone]    Anne Heath MD Referring Internal Medicine [phone]

## 2023-03-27 ENCOUNTER — ANTICOAGULATION THERAPY VISIT (OUTPATIENT)
Dept: ANTICOAGULATION | Facility: CLINIC | Age: 83
End: 2023-03-27
Payer: MEDICARE

## 2023-03-27 DIAGNOSIS — I26.99 PE (PULMONARY THROMBOEMBOLISM) (H): Primary | ICD-10-CM

## 2023-03-27 DIAGNOSIS — Z79.01 LONG TERM CURRENT USE OF ANTICOAGULANTS WITH INR GOAL OF 2.0-3.0: ICD-10-CM

## 2023-03-27 DIAGNOSIS — I48.0 PAROXYSMAL A-FIB (H): ICD-10-CM

## 2023-03-27 DIAGNOSIS — I82.4Z9 DEEP VEIN THROMBOSIS (DVT) OF DISTAL VEIN OF LOWER EXTREMITY, UNSPECIFIED CHRONICITY, UNSPECIFIED LATERALITY (H): ICD-10-CM

## 2023-03-27 LAB — INR (EXTERNAL): 2.6 (ref 0.9–1.1)

## 2023-03-27 NOTE — PROGRESS NOTES
ANTICOAGULATION MANAGEMENT     Erich Craven 82 year old male is on warfarin with therapeutic INR result. (Goal INR 2.0-3.0)    Recent labs: (last 7 days)     03/27/23  1138   INR 2.6*       ASSESSMENT       Source(s): Chart Review and Home Care/Facility Nurse       Warfarin doses taken: Warfarin taken as instructed    Diet: No new diet changes identified    New illness, injury, or hospitalization: No    Medication/supplement changes: None noted    Signs or symptoms of bleeding or clotting: No    Previous INR: Therapeutic last 2(+) visits    Additional findings: None         PLAN     Recommended plan for no diet, medication or health factor changes affecting INR     Dosing Instructions: Continue your current warfarin dose with next INR in 2 weeks       Summary  As of 3/27/2023    Full warfarin instructions:  7.5 mg every Wed, Sat; 5 mg all other days   Next INR check:  4/10/2023             Telephone call with [facility] home care nurse who verbalizes understanding and agrees to plan    Orders given to  Homecare nurse/facility to recheck    Education provided:     Goal range and lab monitoring: goal range and significance of current result    Plan made per [facility] anticoagulation protocol    Raphael Wright RN  Anticoagulation Clinic  3/27/2023    _______________________________________________________________________     Anticoagulation Episode Summary     Current INR goal:  2.0-3.0   TTR:  42.7 % (1 y)   Target end date:  Indefinite   Send INR reminders to:  [facility]    Indications    PE (pulmonary thromboembolism) (H) [I26.99]  Paroxysmal A-fib (H) [I48.0]  Deep vein thrombosis (DVT) of distal vein of lower extremity  unspecified chronicity  unspecified laterality (H) [I82.4Z9]  Long term current use of anticoagulants with INR goal of 2.0-3.0 [Z79.01]           Comments:  Back to [facility] when finished with home care         Anticoagulation Care Providers     Provider Role Specialty Phone number    Dontrell Gómez  MD BRITTANY Referring Internal Medicine [phone]    Anne Heath MD Referring Internal Medicine [phone]

## 2023-03-28 DIAGNOSIS — Z53.9 DIAGNOSIS NOT YET DEFINED: Primary | ICD-10-CM

## 2023-03-28 PROCEDURE — G0180 MD CERTIFICATION HHA PATIENT: HCPCS | Performed by: INTERNAL MEDICINE

## 2023-04-04 ENCOUNTER — MEDICAL CORRESPONDENCE (OUTPATIENT)
Dept: HEALTH INFORMATION MANAGEMENT | Facility: CLINIC | Age: 83
End: 2023-04-04

## 2023-04-05 ENCOUNTER — OFFICE VISIT (OUTPATIENT)
Dept: ORTHOPEDICS | Facility: CLINIC | Age: 83
End: 2023-04-05
Payer: MEDICARE

## 2023-04-05 DIAGNOSIS — Z96.612 STATUS POST REVERSE ARTHROPLASTY OF SHOULDER, LEFT: Primary | ICD-10-CM

## 2023-04-05 PROCEDURE — 99024 POSTOP FOLLOW-UP VISIT: CPT | Performed by: ORTHOPAEDIC SURGERY

## 2023-04-05 NOTE — PROGRESS NOTES
CHIEF CONCERN: Status post left reverse total shoulder arthroplasty  DATE OF SURGERY: 2/21/23    HISTORY OF PRESENT ILLNESS: Mr. Craven is an extremely pleasant 82 year-old man who is 6 weeks status post the above procedure.  He is doing overall well.  His pain is well controlled.  He is now back home from TCU and doing therapies at home.    EXAM:  Pleasant adult male in NAD  Respirations even and unlabored.  Left upper extremity: Incision clean, dry, and intact. Distally neurovascularly intact without deficits. Shoulder range of motion is assisted FE to 100 and ER at side to 10    IMAGING: None new    ASSESSMENT:  1. Six weeks status post above procedure    PLAN:    Range of Motion: Progress to active ROM of the shoulder and light strengthening per operative note. No IR stretching up the back until 3 months    Sling: Discontinue sling    Pain medication:  NSAIDs and Tylenol PRN    Follow up: in 6 weeks with new shoulder xrays      Christofer Floyd MD  Orthopaedic Surgery Resident, PGY5

## 2023-04-05 NOTE — LETTER
4/5/2023         RE: Erich Craven  [address]  [place] [address]        Dear Colleague,    Thank you for referring your patient, Erich Craven, to the [facility] [place]. Please see a copy of my visit note below.    CHIEF CONCERN: Status post left reverse total shoulder arthroplasty  DATE OF SURGERY: 2/21/23    HISTORY OF PRESENT ILLNESS: Mr. Craven is an extremely pleasant 82 year-old man who is 6 weeks status post the above procedure.  He is doing overall well.  His pain is well controlled.  He is now back home from [facility] and doing therapies at home.    EXAM:  Pleasant adult male in NAD  Respirations even and unlabored.  Left upper extremity: Incision clean, dry, and intact. Distally neurovascularly intact without deficits. Shoulder range of motion is assisted FE to 100 and ER at side to 10    IMAGING: None new    ASSESSMENT:  1. Six weeks status post above procedure    PLAN:  Range of Motion: Progress to active ROM of the shoulder and light strengthening per operative note. No IR stretching up the back until 3 months  Sling: Discontinue sling  Pain medication:  NSAIDs and Tylenol PRN  Follow up: in 6 weeks with new shoulder xrays      Christofer Floyd MD  Orthopaedic Surgery Resident, PGY5        Katelyn Vasquez MD

## 2023-04-05 NOTE — NURSING NOTE
Reason For Visit:   Chief Complaint   Patient presents with     Surgical Followup     6 week post op DOS: 2/21/23 Left reverse TSA        PCP: Anne Heath  Ref:      ?  No  Occupation Retired .  Currently working? No.  Work status?  Retired.  Date of injury: NA  Type of injury: NA.  Date of surgery: 2/21/23  Type of surgery: Left reverse total shoulder arthroplasty  Smoker: No  Request smoking cessation information: No     Right hand dominant    SANE score  Affected shoulder: Left  Right shoulder SANE: 100  Left shoulder SANE: 75-80    There were no vitals taken for this visit.      Pain Assessment  Patient Currently in Pain: Tika Alexander, ATC

## 2023-04-08 ENCOUNTER — HEALTH MAINTENANCE LETTER (OUTPATIENT)
Age: 83
End: 2023-04-08

## 2023-04-10 ENCOUNTER — ANTICOAGULATION THERAPY VISIT (OUTPATIENT)
Dept: ANTICOAGULATION | Facility: CLINIC | Age: 83
End: 2023-04-10
Payer: MEDICARE

## 2023-04-10 DIAGNOSIS — I26.99 PE (PULMONARY THROMBOEMBOLISM) (H): Primary | ICD-10-CM

## 2023-04-10 DIAGNOSIS — I82.4Z9 DEEP VEIN THROMBOSIS (DVT) OF DISTAL VEIN OF LOWER EXTREMITY, UNSPECIFIED CHRONICITY, UNSPECIFIED LATERALITY (H): ICD-10-CM

## 2023-04-10 DIAGNOSIS — I89.0 LYMPHEDEMA: ICD-10-CM

## 2023-04-10 DIAGNOSIS — I48.0 PAROXYSMAL A-FIB (H): ICD-10-CM

## 2023-04-10 DIAGNOSIS — Z79.01 LONG TERM CURRENT USE OF ANTICOAGULANTS WITH INR GOAL OF 2.0-3.0: ICD-10-CM

## 2023-04-10 LAB — INR (EXTERNAL): 2.4 (ref 0.9–1.1)

## 2023-04-10 NOTE — PROGRESS NOTES
ANTICOAGULATION MANAGEMENT     Erich Craven 82 year old male is on warfarin with therapeutic INR result. (Goal INR 2.0-3.0)    Recent labs: (last 7 days)     04/10/23  1259   INR 2.4*       ASSESSMENT       Source(s): Chart Review, Patient/Caregiver Call and Home Care/Facility Nurse       Warfarin doses taken: Warfarin taken as instructed    Diet: No new diet changes identified    New illness, injury, or hospitalization: No, but had a temporary crown placed    Medication/supplement changes: Amoxicillin 500 mg One time dose 1 day course (dates: 4/10/23) which may be increasing INR today    Signs or symptoms of bleeding or clotting: No    Previous INR: Therapeutic last 2(+) visits    Additional findings: None         PLAN     Recommended plan for temporary change(s) affecting INR     Dosing Instructions: Continue your current warfarin dose with next INR in 3 weeks       Summary  As of 4/10/2023    Full warfarin instructions:  7.5 mg every Wed, Sat; 5 mg all other days   Next INR check:  5/1/2023             Telephone call with [facility] care nurse who verbalizes understanding and agrees to plan  Sent [employer] message with dosing and follow up instructions    Orders given to  Homecare nurse/facility to recheck    Education provided:     Please call back if any changes to your diet, medications or how you've been taking warfarin    Plan made per [facility] anticoagulation protocol    Shani Alva, RN  Anticoagulation Clinic  4/10/2023    _______________________________________________________________________     Anticoagulation Episode Summary     Current INR goal:  2.0-3.0   TTR:  42.8 % (1 y)   Target end date:  Indefinite   Send INR reminders to:  [facility]    Indications    PE (pulmonary thromboembolism) (H) [I26.99]  Paroxysmal A-fib (H) [I48.0]  Deep vein thrombosis (DVT) of distal vein of lower extremity  unspecified chronicity  unspecified laterality (H) [I82.4Z9]  Long term current use of  anticoagulants with INR goal of 2.0-3.0 [Z79.01]           Comments:  Back to [facility] when finished with home care         Anticoagulation Care Providers     Provider Role Specialty Phone number    Dontrell Gómez MD Referring Internal Medicine [phone]    Anne Heath MD Referring Internal Medicine [phone]

## 2023-04-11 RX ORDER — SPIRONOLACTONE 25 MG/1
TABLET ORAL
Qty: 45 TABLET | Refills: 1 | Status: SHIPPED | OUTPATIENT
Start: 2023-04-11 | End: 2023-10-12

## 2023-04-12 ENCOUNTER — MEDICAL CORRESPONDENCE (OUTPATIENT)
Dept: HEALTH INFORMATION MANAGEMENT | Facility: CLINIC | Age: 83
End: 2023-04-12

## 2023-04-13 ENCOUNTER — VIRTUAL VISIT (OUTPATIENT)
Dept: PHARMACY | Facility: CLINIC | Age: 83
End: 2023-04-13
Payer: COMMERCIAL

## 2023-04-13 DIAGNOSIS — N40.0 BENIGN PROSTATIC HYPERPLASIA WITHOUT LOWER URINARY TRACT SYMPTOMS: ICD-10-CM

## 2023-04-13 DIAGNOSIS — I10 ESSENTIAL HYPERTENSION: ICD-10-CM

## 2023-04-13 DIAGNOSIS — F41.9 ANXIETY: ICD-10-CM

## 2023-04-13 DIAGNOSIS — I48.0 PAROXYSMAL A-FIB (H): ICD-10-CM

## 2023-04-13 DIAGNOSIS — I25.10 CORONARY ARTERY DISEASE INVOLVING NATIVE HEART WITHOUT ANGINA PECTORIS, UNSPECIFIED VESSEL OR LESION TYPE: ICD-10-CM

## 2023-04-13 DIAGNOSIS — K59.00 CONSTIPATION, UNSPECIFIED CONSTIPATION TYPE: ICD-10-CM

## 2023-04-13 DIAGNOSIS — Z96.612 S/P REVERSE TOTAL SHOULDER ARTHROPLASTY, LEFT: Primary | ICD-10-CM

## 2023-04-13 DIAGNOSIS — J44.9 CHRONIC OBSTRUCTIVE PULMONARY DISEASE, UNSPECIFIED COPD TYPE (H): ICD-10-CM

## 2023-04-13 DIAGNOSIS — E78.49 OTHER HYPERLIPIDEMIA: ICD-10-CM

## 2023-04-13 DIAGNOSIS — I82.4Z9 DEEP VEIN THROMBOSIS (DVT) OF DISTAL VEIN OF LOWER EXTREMITY, UNSPECIFIED CHRONICITY, UNSPECIFIED LATERALITY (H): ICD-10-CM

## 2023-04-13 DIAGNOSIS — I50.33 ACUTE ON CHRONIC HEART FAILURE WITH PRESERVED EJECTION FRACTION (H): ICD-10-CM

## 2023-04-13 DIAGNOSIS — E03.9 HYPOTHYROIDISM, UNSPECIFIED TYPE: ICD-10-CM

## 2023-04-13 DIAGNOSIS — I26.99 PE (PULMONARY THROMBOEMBOLISM) (H): ICD-10-CM

## 2023-04-13 DIAGNOSIS — Z78.9 TAKES DIETARY SUPPLEMENTS: ICD-10-CM

## 2023-04-13 PROCEDURE — 99207 PR NO CHARGE LOS: CPT | Mod: VID | Performed by: PHARMACIST

## 2023-04-13 NOTE — PROGRESS NOTES
Medication Therapy Management (MTM) Encounter    ASSESSMENT:                            Medication Adherence/Access: No issues identified    Left Reverse Shoulder Arthroplasty: Stable.    Anxiety/Depression: Stable.    Hypertension/A. Fib/HFpEF/H/o PE/DVT/CAD: May benefit from moving PM dose of furosemide and metolazone to take mid-afternoon to help prevent nocturia.    Hyperlipidemia: Stable.  Patient is on high intensity statin which is indicated based on 2019 ACC/AHA guidelines for lipid management.       COPD: Stable     Hypothyroidism: Stable. Last TSH is within normal limits.      BPH:  Stable.    Constipation:  Stable.    Supplements:  Stable.    PLAN:                            1.  Recommended moving PM dose of furosemide and metolazone to take mid-afternoon.    Follow-up: Return in about 6 months (around 10/13/2023) for Follow-up Medication Review.     SUBJECTIVE/OBJECTIVE:                          Erich Craven is a 82 year old male contacted via secure video for a follow-up visit.  Today's visit is a follow-up MTM visit from 10/27/2022.     Reason for visit: CHITO visit    Tobacco: He reports that he quit smoking about 34 years ago. His smoking use included cigarettes. He started smoking about 59 years ago. He has a 12.50 pack-year smoking history. He has never used smokeless tobacco.  Alcohol: history of alcohol dependence - sober since 1989    Medication Adherence/Access: no issues reported    Left Reverse Shoulder Arthroplasty:  Patient was hospitalized at [facility] 2/21/23-2/24/23 and then [facility] [place] [facility].  Patient is currently using acetaminophen 1000mg three times daily and reports pain is manageable.  No side effects reported.    Anxiety/Depression: Jaun continues taking venlafaxine 75 mg twice daily, dose increased at our last visit.  He was also referred for therapy services at our last visit, he was unable to schedule this due to surgery but he no longer feels it's  necessary.  He reports mental health is improved and stable.  He denies suicidal ideation.    Hypertension/A. Fib/HFpEF/H/o PE/DVT/CAD:  Patient has history of coronary artery disease with stenting of the proximal left anterior descending artery and also stenting of the proximal to mid right coronary artery in 2014.  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     Current medications include aspirin 81mg daily, warfarin as directed, diltiazem ER 300mg daily, furosemide 40 mg every morning and 20 mg every evening (with KCl 10mEq daily), Imdur 30mg daily, lisinopril 10mg daily, metolazone 2.5mg twice daily and spironolactone 25mg every other day.  Patient does not self-monitor blood pressure.  Patient reports increased bruising, denies other signs and symptoms of bleeding, but does complain of nocturia.  At our last visit we discussed moving PM furosemide dose to take mid-afternoon, it does not sound like this was done.  Patient reports some doses of furosemide were held during TCU which caused worsened edema, he's now receiving leg wrapping and limiting sodium/fluid intake and symptoms are improving.  Last EF 2/2/23 - 60-65%    INR (External)   Date Value Ref Range Status   04/10/2023 2.4 (A) 0.9 - 1.1 Final       BP Readings from Last 3 Encounters:   03/13/23 109/67   03/05/23 115/67   03/01/23 97/58      Potassium   Date Value Ref Range Status   02/22/2023 3.7 3.4 - 5.3 mmol/L Final   10/18/2022 3.5 3.4 - 5.3 mmol/L Final   06/21/2021 4.1 3.4 - 5.3 mmol/L Final         Hyperlipidemia: Current therapy includes atorvastatin 40mg daily.  Patient reports no significant myalgias or other side effects.  Recent Labs   Lab Test 08/02/22  0829 05/03/22  1656   CHOL 129 141   HDL 48 48   LDL 63 57   TRIG 92 180*     COPD: Current medications: albuterol MDI as needed (no recent use) and Trelegy Ellipta 200mcg daily.    Patient is not experiencing side effects.   Patient reports the following symptoms: none.  Patient does  have an COPD Action Plan on file.   Has spirometry been completed: Yes     Hypothyroidism: Patient is taking levothyroxine 175 mcg daily. Patient is having the following symptoms: none.   TSH   Date Value Ref Range Status   12/14/2022 1.58 0.30 - 4.20 uIU/mL Final   10/13/2022 3.07 0.40 - 4.00 mU/L Final   06/14/2021 0.71 0.40 - 4.00 mU/L Final         BPH: Patient is taking tamsulosin 0.4 mg daily.  He reports urinary symptoms are worsened, as noted above.  No side effects reported.    Constipation:  Patient is using Miralax 17g daily, which he reports is effective.  No side effects reported.    Supplements: Jaun continues taking acetyl-l-carnitine, a daily multivitamin and taurine.  He finds these effective and wishes to continue using.  No side effects reported.    Today's Vitals: There were no vitals taken for this visit.  ----------------  Post Discharge Medication Reconciliation Status: discharge medications reconciled and changed, per note/orders.    I spent 14 minutes with this patient today. A copy of the visit note was provided to the patient's provider(s).    A summary of these recommendations was sent via [employer].    Kassi Glez, PharmD, [facility]  Medication Therapy Management Provider  Pager: [phone]     Telemedicine Visit Details  Type of service:  Video Conference via [employer]  Start Time: 10:05 AM  End Time: 10:19 AM     Medication Therapy Recommendations  (HFpEF) heart failure with preserved ejection fraction (H)    Current Medication: furosemide (LASIX) 20 MG tablet   Rationale: Undesirable effect - Adverse medication event - Safety   Recommendation: Change Administration Time   Status: Patient Agreed - Adherence/Education

## 2023-04-14 ENCOUNTER — MEDICAL CORRESPONDENCE (OUTPATIENT)
Dept: HEALTH INFORMATION MANAGEMENT | Facility: CLINIC | Age: 83
End: 2023-04-14

## 2023-04-18 NOTE — PATIENT INSTRUCTIONS
"Recommendations from today's MTM visit:                                                    MTM (medication therapy management) is a service provided by a clinical pharmacist designed to help you get the most of out of your medicines.   Today we reviewed what your medicines are for, how to know if they are working, that your medicines are safe and how to make your medicine regimen as easy as possible.      Please try moving PM dose of furosemide and metolazone to take mid-afternoon instead to see if this helps prevent overnight urination    Follow-up: Return in about 6 months (around 10/13/2023) for Follow-up Medication Review.    It was great speaking with you today.  I value your experience and would be very thankful for your time in providing feedback in our clinic survey. In the next few days, you may receive an email or text message from [employer] with a link to a survey related to your  clinical pharmacist.\"     To schedule another MTM appointment, please call the clinic directly or you may call the MTM scheduling line at [phone] or toll-free at [phone].     My Clinical Pharmacist's contact information:                                                      Please feel free to contact me with any questions or concerns you have.      Kassi Glez, PharmD, [facility]  Medication Therapy Management Provider  Pager: [phone]    "

## 2023-04-19 ENCOUNTER — OFFICE VISIT (OUTPATIENT)
Dept: FAMILY MEDICINE | Facility: CLINIC | Age: 83
End: 2023-04-19
Payer: MEDICARE

## 2023-04-19 VITALS
SYSTOLIC BLOOD PRESSURE: 124 MMHG | RESPIRATION RATE: 22 BRPM | TEMPERATURE: 97.4 F | HEART RATE: 70 BPM | BODY MASS INDEX: 35.96 KG/M2 | OXYGEN SATURATION: 96 % | HEIGHT: 72 IN | DIASTOLIC BLOOD PRESSURE: 69 MMHG | WEIGHT: 265.5 LBS

## 2023-04-19 DIAGNOSIS — E78.49 OTHER HYPERLIPIDEMIA: Primary | ICD-10-CM

## 2023-04-19 DIAGNOSIS — R79.89 ELEVATED VITAMIN B12 LEVEL: ICD-10-CM

## 2023-04-19 DIAGNOSIS — R73.03 PREDIABETES: ICD-10-CM

## 2023-04-19 DIAGNOSIS — I89.0 LYMPHEDEMA: ICD-10-CM

## 2023-04-19 DIAGNOSIS — I50.32 CHRONIC DIASTOLIC HEART FAILURE (H): ICD-10-CM

## 2023-04-19 DIAGNOSIS — E66.01 MORBID OBESITY (H): ICD-10-CM

## 2023-04-19 DIAGNOSIS — I10 PRIMARY HYPERTENSION: ICD-10-CM

## 2023-04-19 DIAGNOSIS — Z23 NEED FOR VACCINATION: ICD-10-CM

## 2023-04-19 DIAGNOSIS — E03.9 HYPOTHYROIDISM, UNSPECIFIED TYPE: ICD-10-CM

## 2023-04-19 DIAGNOSIS — I50.30 HEART FAILURE WITH PRESERVED EJECTION FRACTION, UNSPECIFIED HF CHRONICITY (H): ICD-10-CM

## 2023-04-19 PROBLEM — D32.0 BENIGN NEOPLASM OF CEREBRAL MENINGES (H): Status: RESOLVED | Noted: 2022-04-21 | Resolved: 2023-04-19

## 2023-04-19 LAB
ALBUMIN SERPL BCG-MCNC: 4 G/DL (ref 3.5–5.2)
ALP SERPL-CCNC: 106 U/L (ref 40–129)
ALT SERPL W P-5'-P-CCNC: 14 U/L (ref 10–50)
ANION GAP SERPL CALCULATED.3IONS-SCNC: 13 MMOL/L (ref 7–15)
AST SERPL W P-5'-P-CCNC: 21 U/L (ref 10–50)
BILIRUB SERPL-MCNC: 0.4 MG/DL
BUN SERPL-MCNC: 25 MG/DL (ref 8–23)
CALCIUM SERPL-MCNC: 9.7 MG/DL (ref 8.8–10.2)
CHLORIDE SERPL-SCNC: 94 MMOL/L (ref 98–107)
CHOLEST SERPL-MCNC: 184 MG/DL
CREAT SERPL-MCNC: 1.1 MG/DL (ref 0.67–1.17)
CREAT UR-MCNC: 96.3 MG/DL
DEPRECATED HCO3 PLAS-SCNC: 30 MMOL/L (ref 22–29)
GFR SERPL CREATININE-BSD FRML MDRD: 67 ML/MIN/1.73M2
GLUCOSE SERPL-MCNC: 102 MG/DL (ref 70–99)
HBA1C MFR BLD: 5.6 % (ref 0–5.6)
HDLC SERPL-MCNC: 52 MG/DL
LDLC SERPL CALC-MCNC: 111 MG/DL
MICROALBUMIN UR-MCNC: <12 MG/L
MICROALBUMIN/CREAT UR: NORMAL MG/G{CREAT}
NONHDLC SERPL-MCNC: 132 MG/DL
POTASSIUM SERPL-SCNC: 3.2 MMOL/L (ref 3.4–5.3)
PROT SERPL-MCNC: 7.1 G/DL (ref 6.4–8.3)
SODIUM SERPL-SCNC: 137 MMOL/L (ref 136–145)
T4 FREE SERPL-MCNC: 1.52 NG/DL (ref 0.9–1.7)
TRIGL SERPL-MCNC: 107 MG/DL
TSH SERPL DL<=0.005 MIU/L-ACNC: 5.04 UIU/ML (ref 0.3–4.2)
VIT B12 SERPL-MCNC: 1660 PG/ML (ref 232–1245)

## 2023-04-19 PROCEDURE — 99000 SPECIMEN HANDLING OFFICE-LAB: CPT | Performed by: INTERNAL MEDICINE

## 2023-04-19 PROCEDURE — 83036 HEMOGLOBIN GLYCOSYLATED A1C: CPT | Performed by: INTERNAL MEDICINE

## 2023-04-19 PROCEDURE — 82043 UR ALBUMIN QUANTITATIVE: CPT | Performed by: INTERNAL MEDICINE

## 2023-04-19 PROCEDURE — 80061 LIPID PANEL: CPT | Performed by: INTERNAL MEDICINE

## 2023-04-19 PROCEDURE — 80053 COMPREHEN METABOLIC PANEL: CPT | Performed by: INTERNAL MEDICINE

## 2023-04-19 PROCEDURE — 84443 ASSAY THYROID STIM HORMONE: CPT | Performed by: INTERNAL MEDICINE

## 2023-04-19 PROCEDURE — 84244 ASSAY OF RENIN: CPT | Mod: 90 | Performed by: INTERNAL MEDICINE

## 2023-04-19 PROCEDURE — 90715 TDAP VACCINE 7 YRS/> IM: CPT | Performed by: INTERNAL MEDICINE

## 2023-04-19 PROCEDURE — 99214 OFFICE O/P EST MOD 30 MIN: CPT | Mod: 25 | Performed by: INTERNAL MEDICINE

## 2023-04-19 PROCEDURE — 36415 COLL VENOUS BLD VENIPUNCTURE: CPT | Performed by: INTERNAL MEDICINE

## 2023-04-19 PROCEDURE — 82570 ASSAY OF URINE CREATININE: CPT | Performed by: INTERNAL MEDICINE

## 2023-04-19 PROCEDURE — 84439 ASSAY OF FREE THYROXINE: CPT | Performed by: INTERNAL MEDICINE

## 2023-04-19 PROCEDURE — 90471 IMMUNIZATION ADMIN: CPT | Performed by: INTERNAL MEDICINE

## 2023-04-19 PROCEDURE — 82607 VITAMIN B-12: CPT | Performed by: INTERNAL MEDICINE

## 2023-04-19 PROCEDURE — 82088 ASSAY OF ALDOSTERONE: CPT | Performed by: INTERNAL MEDICINE

## 2023-04-19 RX ORDER — FUROSEMIDE 20 MG
20 TABLET ORAL EVERY EVENING
Qty: 90 TABLET | Refills: 0 | Status: SHIPPED | OUTPATIENT
Start: 2023-04-19 | End: 2023-06-19

## 2023-04-19 RX ORDER — ISOSORBIDE MONONITRATE 30 MG/1
30 TABLET, EXTENDED RELEASE ORAL DAILY
Qty: 90 TABLET | Refills: 3 | Status: SHIPPED | OUTPATIENT
Start: 2023-04-19

## 2023-04-19 RX ORDER — FUROSEMIDE 40 MG
40 TABLET ORAL EVERY MORNING
Qty: 90 TABLET | Refills: 1 | Status: SHIPPED | OUTPATIENT
Start: 2023-04-19 | End: 2023-06-26

## 2023-04-19 ASSESSMENT — PAIN SCALES - GENERAL: PAINLEVEL: SEVERE PAIN (6)

## 2023-04-19 NOTE — Clinical Note
Please abstract the following data from this visit with this patient into the appropriate field in Epic:  Tests that can be patient reported without a hard copy:  Eye exam with ophthalmology on this date: 12/05/2022 at [facility]

## 2023-04-19 NOTE — PROGRESS NOTES
Assessment & Plan   Problem List Items Addressed This Visit        Digestive    Morbid obesity (H)       Endocrine    Hypothyroidism    Relevant Orders    TSH with free T4 reflex (Completed)    T4 free (Completed)    HLD (hyperlipidemia) - Primary    Relevant Orders    Lipid panel reflex to direct LDL Fasting (Completed)       Circulatory    HTN (hypertension)    Relevant Orders    Aldosterone Renin Ratio (Completed)    Chronic diastolic heart failure (H)    Relevant Medications    furosemide (LASIX) 40 MG tablet    furosemide (LASIX) 20 MG tablet    Heart failure with preserved ejection fraction, unspecified HF chronicity (H)    Relevant Medications    isosorbide mononitrate (IMDUR) 30 MG 24 hr tablet    Other Relevant Orders    Albumin Random Urine Quantitative with Creat Ratio (Completed)    Comprehensive metabolic panel (BMP + Alb, Alk Phos, ALT, AST, Total. Bili, TP) (Completed)       Other    Lymphedema   Other Visit Diagnoses     Prediabetes        Relevant Orders    HEMOGLOBIN A1C (Completed)    Elevated vitamin B12 level        Relevant Orders    Vitamin B12 (Completed)    Need for vaccination             No change of medications, continue with doing the wraps on his lower extremities.  Watch for any signs or symptoms of cellulitis.  Blood pressure and hemodynamics are stable.  Compliant with the BiPAP.  He denies any history of meningioma, we removed that from his problem list.  His leg edema was probably due to discontinuation of his Lasix for period Of time ,he resumed is also on metolazone.  Check some labs, refill for Imdur and Lasix.  Reviewed echocardiogram ejection fraction is normal, he has HFpEF.         MED REC REQUIRED  Post Medication Reconciliation Status: discharge medications reconciled, continue medications without change  BMI:   Estimated body mass index is 36.01 kg/m  as calculated from the following:    Height as of this encounter: 1.829 m (6').    Weight as of this encounter: 120.4 kg  (265 lb 8 oz).   Weight management plan: Discussed healthy diet and exercise guidelines    Work on weight loss  Regular exercise  See Patient Instructions    Anne Heath MD  [facility] HERNAN Zamorano is a 82 year old, presenting for the following health issues:  Hospital F/U (Concern about weight gain and swelling on both legs), Leg Swelling (Concern about skin breaking open, OT has been wrapping legs, questions, has compression stocking (hard to use with swelling)), and Health Maintenance (Last Eye Exam done on 12/05/2022 at [facility], abstracted.)      Lists of hospitals in the [place]       Hospital Follow-up Visit:    Hospital/Nursing Home/IP Rehab Facility: [facility] ([facility])   Date of Admission: 02/24/2023  Date of Discharge: 03/15/2023  Reason(s) for Admission:      Morbid obesity (H)  Chronic bronchitis, unspecified chronic bronchitis type (H)  Primary hypertension  Chronic diastolic heart failure (H)  Paroxysmal A-fib (H)  Chronic left shoulder pain    Was your hospitalization related to COVID-19? No   Problems taking medications regularly:  None  Medication changes since discharge: None  Problems adhering to non-medication therapy:  None    Summary of hospitalization:  CareEverywhere information obtained and reviewed  Diagnostic Tests/Treatments reviewed.  Follow up needed:yes  Other Healthcare Providers Involved in Patient s Care:         None  Update since discharge: stable. Concern about weight gain and swelling on both legs         Patient presenting for follow-up.  Has been doing well he is getting some lymphedema that is of new onset since his discharge from [facility].  He denies any shortness of breath or difficulty breathing.  Denies any palpitations no dizziness orthostatic dizziness.  Has been applying pressure ulcers on his lower extremities and has been getting wraps done by PT 3 times a week.  No GI or  symptoms.  Remains on anticoagulation with Coumadin for paroxysmal A-fib.  He  is also on aspirin daily.  He reports some bruising in his upper extremities.  No melena or blood in the stools.      Plan of care communicated with patient                 Review of Systems   Constitutional, HEENT, cardiovascular, pulmonary, gi and gu systems are negative, except as otherwise noted.      Objective    /69 (BP Location: Left arm, Patient Position: Sitting, Cuff Size: Adult Large)   Pulse 70   Temp 97.4  F (36.3  C) (Oral)   Resp 22   Ht 1.829 m (6')   Wt 120.4 kg (265 lb 8 oz)   SpO2 96%   BMI 36.01 kg/m    Body mass index is 36.01 kg/m .  Physical Exam   GENERAL: healthy, alert and no distress, obese, very pleasant  EYES: Eyes grossly normal to inspection, PERRL and conjunctivae and sclerae normal  NECK: no adenopathy, no asymmetry, masses, or scars and thyroid normal to palpation  RESP: lungs clear to auscultation - no rales, rhonchi or wheezes  CV: regular rate and rhythm, normal S1 S2, no S3 or S4, no murmur, click or rub, +2 nonpitting peripheral edema and peripheral pulses strong  ABDOMEN: soft, nontender, no hepatosplenomegaly, no masses and bowel sounds normal  MS: no gross musculoskeletal defects noted, no edema  SKIN: no suspicious lesions or rashes, has thick scales on bilateral lower extremities  NEURO: Normal strength and tone, mentation intact and speech normal  PSYCH: mentation appears normal, affect normal/bright    Anticoagulation Therapy Visit on 04/10/2023   Component Date Value Ref Range Status     INR (External) 04/10/2023 2.4 (A)  0.9 - 1.1 Final

## 2023-04-19 NOTE — PROGRESS NOTES
Prior to immunization administration, verified patients identity using patient s name and date of birth. Please see Immunization Activity for additional information.     Screening Questionnaire for Adult Immunization    Are you sick today?   No   Do you have allergies to medications, food, a vaccine component or latex?   No   Have you ever had a serious reaction after receiving a vaccination?   No   Do you have a long-term health problem with heart, lung, kidney, or metabolic disease (e.g., diabetes), asthma, a blood disorder, no spleen, complement component deficiency, a cochlear implant, or a spinal fluid leak?  Are you on long-term aspirin therapy?   Yes   Do you have cancer, leukemia, HIV/AIDS, or any other immune system problem?   No   Do you have a parent, brother, or sister with an immune system problem?   No   In the past 3 months, have you taken medications that affect  your immune system, such as prednisone, other steroids, or anticancer drugs; drugs for the treatment of rheumatoid arthritis, Crohn s disease, or psoriasis; or have you had radiation treatments?   No   Have you had a seizure, or a brain or other nervous system problem?   No   During the past year, have you received a transfusion of blood or blood    products, or been given immune (gamma) globulin or antiviral drug?   No   For women: Are you pregnant or is there a chance you could become       pregnant during the next month?   No   Have you received any vaccinations in the past 4 weeks?   No     Immunization questionnaire was positive for at least one answer.  Notified PCP. LIONEL Cobian.    ABN Signed by patient.    Injection of Tdap given by Willie Rothman MA. Patient instructed to remain in clinic for 15 minutes afterwards, and to report any adverse reactions.     Screening performed by Willie Rothman MA on 4/19/2023 at 8:56 AM.

## 2023-04-20 ENCOUNTER — MYC MEDICAL ADVICE (OUTPATIENT)
Dept: FAMILY MEDICINE | Facility: CLINIC | Age: 83
End: 2023-04-20
Payer: MEDICARE

## 2023-04-20 DIAGNOSIS — E61.1 LOW IRON: Primary | ICD-10-CM

## 2023-04-20 DIAGNOSIS — E87.6 HYPOKALEMIA: Primary | ICD-10-CM

## 2023-04-20 DIAGNOSIS — E03.9 HYPOTHYROIDISM, UNSPECIFIED TYPE: ICD-10-CM

## 2023-04-20 RX ORDER — TAMSULOSIN HYDROCHLORIDE 0.4 MG/1
CAPSULE ORAL
Qty: 90 CAPSULE | Refills: 0 | Status: SHIPPED | OUTPATIENT
Start: 2023-04-20 | End: 2023-07-14

## 2023-04-20 NOTE — RESULT ENCOUNTER NOTE
Real Zamorano, reviewed your labs,    Cholesterol panel shows slight increase of LDL[LDL is a bad cholesterol].  Continue atorvastatin 40 mg 1 tablet once daily at bedtime,  HDL which is the good cholesterol is at goal and triglyceride is normal.    Thyroid test called TSH slightly increased at 5.0 we will continue to monitor.  Stay on current dose of levothyroxine, we will repeat your thyroid function test in the next 2 to 3 months.    Chemistry shows slightly low potassium of 3.2, Urine nitrogen is elevated means you are slightly behind in fluids, and you need to increase fluid intake,  Liver enzymes are normal,  Kidney function test slight decrease from 1 months ago; though still remains within normal.  Low potassium could be due to the diuretics you are taking the furosemide.  Please make sure you are taking potassium supplements there are 10 mEq once daily, I would advise you increase the potassium for the next week; that is you take 2 tablets once daily of the 10 mEq of potassium pills, for the next week and then resume the 1 tablet once daily.  We will repeat your potassium again in the next 2 to 3 weeks.    Vitamin B12 is slightly elevated.  Can hold on taking vitamin B12 supplements for the next week or so.    Free T4 which is real thyroid hormone in the blood is normal reassuring.    Urine protein called microalbumin is negative, it is reassuring,    Your diabetes is still very well controlled, HbA1c is 5.6 decreased from 4 months ago.    Please call the lab in the next 2 weeks to schedule to repeat potassium level    Any further questions please let me know  Dr Heath

## 2023-04-21 LAB — ALDOST SERPL-MCNC: 26.3 NG/DL (ref 0–31)

## 2023-04-22 LAB
ALDOST/RENIN PLAS-RTO: 1.4 {RATIO} (ref 0–25)
RENIN PLAS-CCNC: 19 NG/ML/HR

## 2023-04-24 ENCOUNTER — MYC MEDICAL ADVICE (OUTPATIENT)
Dept: FAMILY MEDICINE | Facility: CLINIC | Age: 83
End: 2023-04-24
Payer: MEDICARE

## 2023-04-27 ENCOUNTER — TELEPHONE (OUTPATIENT)
Dept: FAMILY MEDICINE | Facility: CLINIC | Age: 83
End: 2023-04-27
Payer: MEDICARE

## 2023-04-27 NOTE — TELEPHONE ENCOUNTER
The Home Care/Assisted Living/Nursing Facility is calling regarding an established patient.  Has the patient seen Home Care in the past or is currently residing in Assisted Living or Nursing Facility? Yes.     Cheri calling from [facility] requesting the following orders that are within the Home Care, Assisted Living or Nursing Home Eval and Treatment standing order and can be signed as standing order signature required by RN.    Preferred Call Back Number:[phone] confidential      Any additional Orders:  Are there any orders requested, not stated above, that are outside of the standing order and must be routed to a licensed practitioner for approval?    yes     Pt being discharged from Homecare in next week or two- INRs stable and pt is no longer homebound    Writer has verified Requestor will send fax to have orders signed.      Juliana GALAVIZ RN  [facility] Triage Team

## 2023-04-30 DIAGNOSIS — I50.32 CHRONIC HEART FAILURE WITH PRESERVED EJECTION FRACTION (H): ICD-10-CM

## 2023-04-30 DIAGNOSIS — R06.02 SHORTNESS OF BREATH: ICD-10-CM

## 2023-04-30 DIAGNOSIS — R53.83 OTHER FATIGUE: Primary | ICD-10-CM

## 2023-04-30 DIAGNOSIS — R50.9 LOW GRADE FEVER: ICD-10-CM

## 2023-05-01 ENCOUNTER — LAB (OUTPATIENT)
Dept: LAB | Facility: CLINIC | Age: 83
End: 2023-05-01
Payer: MEDICARE

## 2023-05-01 ENCOUNTER — ANTICOAGULATION THERAPY VISIT (OUTPATIENT)
Dept: ANTICOAGULATION | Facility: CLINIC | Age: 83
End: 2023-05-01

## 2023-05-01 DIAGNOSIS — Z79.01 LONG TERM CURRENT USE OF ANTICOAGULANTS WITH INR GOAL OF 2.0-3.0: ICD-10-CM

## 2023-05-01 DIAGNOSIS — I50.32 CHRONIC HEART FAILURE WITH PRESERVED EJECTION FRACTION (H): ICD-10-CM

## 2023-05-01 DIAGNOSIS — E87.6 HYPOKALEMIA: ICD-10-CM

## 2023-05-01 DIAGNOSIS — I26.99 PE (PULMONARY THROMBOEMBOLISM) (H): Primary | ICD-10-CM

## 2023-05-01 DIAGNOSIS — I25.10 CORONARY ARTERY DISEASE INVOLVING NATIVE CORONARY ARTERY OF NATIVE HEART WITHOUT ANGINA PECTORIS: ICD-10-CM

## 2023-05-01 DIAGNOSIS — I50.30 HEART FAILURE WITH PRESERVED EJECTION FRACTION, UNSPECIFIED HF CHRONICITY (H): ICD-10-CM

## 2023-05-01 DIAGNOSIS — I82.4Z9 DEEP VEIN THROMBOSIS (DVT) OF DISTAL VEIN OF LOWER EXTREMITY, UNSPECIFIED CHRONICITY, UNSPECIFIED LATERALITY (H): ICD-10-CM

## 2023-05-01 DIAGNOSIS — I10 ESSENTIAL HYPERTENSION: ICD-10-CM

## 2023-05-01 DIAGNOSIS — M79.89 LEG SWELLING: Primary | ICD-10-CM

## 2023-05-01 DIAGNOSIS — R06.02 SHORTNESS OF BREATH: ICD-10-CM

## 2023-05-01 DIAGNOSIS — R53.83 OTHER FATIGUE: ICD-10-CM

## 2023-05-01 DIAGNOSIS — Z12.11 COLON CANCER SCREENING: ICD-10-CM

## 2023-05-01 DIAGNOSIS — R79.82 CRP ELEVATED: ICD-10-CM

## 2023-05-01 DIAGNOSIS — I48.0 PAROXYSMAL A-FIB (H): ICD-10-CM

## 2023-05-01 DIAGNOSIS — R50.9 LOW GRADE FEVER: ICD-10-CM

## 2023-05-01 LAB
ALBUMIN SERPL BCG-MCNC: 4 G/DL (ref 3.5–5.2)
ALP SERPL-CCNC: 111 U/L (ref 40–129)
ALT SERPL W P-5'-P-CCNC: 13 U/L (ref 10–50)
ANION GAP SERPL CALCULATED.3IONS-SCNC: 12 MMOL/L (ref 7–15)
AST SERPL W P-5'-P-CCNC: 19 U/L (ref 10–50)
BASOPHILS # BLD AUTO: 0.1 10E3/UL (ref 0–0.2)
BASOPHILS NFR BLD AUTO: 1 %
BILIRUB SERPL-MCNC: 0.3 MG/DL
BUN SERPL-MCNC: 24.2 MG/DL (ref 8–23)
CALCIUM SERPL-MCNC: 9.8 MG/DL (ref 8.8–10.2)
CHLORIDE SERPL-SCNC: 95 MMOL/L (ref 98–107)
CHOLEST SERPL-MCNC: 170 MG/DL
CREAT SERPL-MCNC: 1.1 MG/DL (ref 0.67–1.17)
CRP SERPL-MCNC: 77.8 MG/L
DEPRECATED HCO3 PLAS-SCNC: 30 MMOL/L (ref 22–29)
EOSINOPHIL # BLD AUTO: 0.2 10E3/UL (ref 0–0.7)
EOSINOPHIL NFR BLD AUTO: 2 %
ERYTHROCYTE [DISTWIDTH] IN BLOOD BY AUTOMATED COUNT: 16.1 % (ref 10–15)
ERYTHROCYTE [SEDIMENTATION RATE] IN BLOOD BY WESTERGREN METHOD: 54 MM/HR (ref 0–20)
GFR SERPL CREATININE-BSD FRML MDRD: 67 ML/MIN/1.73M2
GLUCOSE SERPL-MCNC: 79 MG/DL (ref 70–99)
HCT VFR BLD AUTO: 33.8 % (ref 40–53)
HDLC SERPL-MCNC: 53 MG/DL
HGB BLD-MCNC: 10.5 G/DL (ref 13.3–17.7)
IMM GRANULOCYTES # BLD: 0 10E3/UL
IMM GRANULOCYTES NFR BLD: 0 %
INR (EXTERNAL): 2.1 (ref 0.9–1.1)
LDLC SERPL CALC-MCNC: 101 MG/DL
LYMPHOCYTES # BLD AUTO: 1.7 10E3/UL (ref 0.8–5.3)
LYMPHOCYTES NFR BLD AUTO: 17 %
MCH RBC QN AUTO: 28.9 PG (ref 26.5–33)
MCHC RBC AUTO-ENTMCNC: 31.1 G/DL (ref 31.5–36.5)
MCV RBC AUTO: 93 FL (ref 78–100)
MONOCYTES # BLD AUTO: 1 10E3/UL (ref 0–1.3)
MONOCYTES NFR BLD AUTO: 10 %
NEUTROPHILS # BLD AUTO: 6.9 10E3/UL (ref 1.6–8.3)
NEUTROPHILS NFR BLD AUTO: 70 %
NONHDLC SERPL-MCNC: 117 MG/DL
NT-PROBNP SERPL-MCNC: 373 PG/ML (ref 0–1800)
PLATELET # BLD AUTO: 255 10E3/UL (ref 150–450)
POTASSIUM SERPL-SCNC: 3.9 MMOL/L (ref 3.4–5.3)
PROT SERPL-MCNC: 7.5 G/DL (ref 6.4–8.3)
RBC # BLD AUTO: 3.63 10E6/UL (ref 4.4–5.9)
SODIUM SERPL-SCNC: 137 MMOL/L (ref 136–145)
TRIGL SERPL-MCNC: 82 MG/DL
WBC # BLD AUTO: 9.8 10E3/UL (ref 4–11)

## 2023-05-01 PROCEDURE — 36415 COLL VENOUS BLD VENIPUNCTURE: CPT

## 2023-05-01 PROCEDURE — 80053 COMPREHEN METABOLIC PANEL: CPT

## 2023-05-01 PROCEDURE — 80061 LIPID PANEL: CPT

## 2023-05-01 PROCEDURE — 85652 RBC SED RATE AUTOMATED: CPT

## 2023-05-01 PROCEDURE — 86140 C-REACTIVE PROTEIN: CPT

## 2023-05-01 PROCEDURE — 85025 COMPLETE CBC W/AUTO DIFF WBC: CPT

## 2023-05-01 PROCEDURE — 83880 ASSAY OF NATRIURETIC PEPTIDE: CPT

## 2023-05-01 NOTE — TELEPHONE ENCOUNTER
Called pt, mild intermittent SOB, Low grade fever 99 f, last Friday, some fatigue. Has frequency being on diuretics, no cough or phlegm or .      If SBP >140 , increase lisinopril to full tablet, 20 mg daily.     Nurse checked his legs, no cellulitis per patient     Advised to schedule follow up next Tuesday

## 2023-05-01 NOTE — PROGRESS NOTES
ANTICOAGULATION MANAGEMENT     Erich Craven 82 year old male is on warfarin with therapeutic INR result. (Goal INR 2.0-3.0)    Recent labs: (last 7 days)     05/01/23  1422   INR 2.1*       ASSESSMENT       Source(s): Chart Review, Patient/Caregiver Call and Home Care/Facility Nurse       Warfarin doses taken: Warfarin taken as instructed    Diet: No new diet changes identified    Medication/supplement changes: None noted    New illness, injury, or hospitalization: Edema continues in BLE    Signs or symptoms of bleeding or clotting: No    Previous result: Therapeutic last 2(+) visits    Additional findings: None         PLAN     Recommended plan for no diet, medication or health factor changes affecting INR     Dosing Instructions: Continue your current warfarin dose with next INR in 2 weeks       Summary  As of 5/1/2023    Full warfarin instructions:  7.5 mg every Wed, Sat; 5 mg all other days   Next INR check:  5/15/2023             Telephone call with [facility] home care nurse who verbalizes understanding and agrees to plan    Orders given to  Homecare nurse/facility to recheck    Education provided:     Please call back if any changes to your diet, medications or how you've been taking warfarin    Plan made per [facility] anticoagulation protocol    Shani Alva, RN  Anticoagulation Clinic  5/1/2023    _______________________________________________________________________     Anticoagulation Episode Summary     Current INR goal:  2.0-3.0   TTR:  42.8 % (1 y)   Target end date:  Indefinite   Send INR reminders to:  SERENITY PINEDA    Indications    PE (pulmonary thromboembolism) (H) [I26.99]  Paroxysmal A-fib (H) [I48.0]  Deep vein thrombosis (DVT) of distal vein of lower extremity  unspecified chronicity  unspecified laterality (H) [I82.4Z9]  Long term current use of anticoagulants with INR goal of 2.0-3.0 [Z79.01]           Comments:  Back to [facility]NIA when finished with home care         Anticoagulation  Care Providers     Provider Role Specialty Phone number    Dontrell Gómez MD Referring Internal Medicine [phone]    Anne Heath MD Referring Internal Medicine [phone]

## 2023-05-02 PROCEDURE — 82274 ASSAY TEST FOR BLOOD FECAL: CPT | Performed by: INTERNAL MEDICINE

## 2023-05-02 NOTE — TELEPHONE ENCOUNTER
Spoke to patient, reviewed labs.  patient can schedule appointment at 3 PM on Wednesday.  Inflammatory markers slightly elevated and slight drop in hemoglobin.  He will repeat labs on Wednesday and do a stool fit test.  He is feeling better today.      I would approve a follow-up appoint for patient at 3 PM on Wednesday.  Dr Heath

## 2023-05-02 NOTE — RESULT ENCOUNTER NOTE
Please can we schedule follow-up appointment for patient next Wednesday at 3 PM.    Reviewed labs with patient.  Advise repeat labs on Wednesday.  We will monitor inflammatory markers.  Slight drop in hemoglobin hematocrit, we will check a fit test.  Advised to keep well-hydrated.    CBC shows normal white blood cell count.    Dr Heath

## 2023-05-02 NOTE — TELEPHONE ENCOUNTER
Pt returned call and is scheduled tomorrow in the hold slot per notes below. Pt was already scheduled for lab only appointment tomorrow morning. He stated understanding and had no further questions at this time.    Brittany JUDD RN  [facility]

## 2023-05-02 NOTE — TELEPHONE ENCOUNTER
Lvm for pt to call back. Dr Heath's 3pm slot for 5/3 on hold for this pt.  message sent as well - for pt to confirm appt date and time.  Patience Yi CMA

## 2023-05-02 NOTE — TELEPHONE ENCOUNTER
Outpatient Medication Detail     Disp Refills Start End SAL   furosemide (LASIX) 40 MG tablet 90 tablet 1 4/19/2023  No   Sig - Route: Take 1 tablet (40 mg) by mouth every morning - Oral   Sent to pharmacy as: Furosemide 40 MG Oral Tablet (LASIX)   Class: E-Prescribe   Order: [id number]   E-Prescribing Status: Receipt confirmed by pharmacy (4/19/2023  8:34 AM CDT)     Pharmacy    [id number] - [place], [place] - [address]     Pharmacy seeking refill of furosemide 40mg.  Last Rx'd on 4- good for 6 months .    Too soon to renew.    Fax sent back to pharmacy - check records for Rx; not due to renew.    RT Janette (R)

## 2023-05-02 NOTE — TELEPHONE ENCOUNTER
Called [facility] and left detailed voicemail per ok to leave detailed message, regarding PCP message below. Left clinic number to call back if questions/concerns.     Gabriela Wilder RN on 5/2/2023 at 9:18 AM

## 2023-05-02 NOTE — TELEPHONE ENCOUNTER
Nazario RN with [facility]  called requesting-     Skilled nursing order for-   Wound care   Compression therapy   Due to increased swelling and weeping of LLE.     Visits for skilled nursing-   2x/wk for 5 weeks      Routing to PCP to review and advise.     Please call Nazario back with providers response.

## 2023-05-03 ENCOUNTER — OFFICE VISIT (OUTPATIENT)
Dept: FAMILY MEDICINE | Facility: CLINIC | Age: 83
End: 2023-05-03
Payer: MEDICARE

## 2023-05-03 ENCOUNTER — LAB (OUTPATIENT)
Dept: LAB | Facility: CLINIC | Age: 83
End: 2023-05-03
Payer: MEDICARE

## 2023-05-03 VITALS
HEIGHT: 72 IN | WEIGHT: 271.1 LBS | BODY MASS INDEX: 36.72 KG/M2 | DIASTOLIC BLOOD PRESSURE: 77 MMHG | OXYGEN SATURATION: 94 % | RESPIRATION RATE: 22 BRPM | TEMPERATURE: 98 F | SYSTOLIC BLOOD PRESSURE: 130 MMHG | HEART RATE: 78 BPM

## 2023-05-03 DIAGNOSIS — I89.0 LYMPHEDEMA: ICD-10-CM

## 2023-05-03 DIAGNOSIS — S81.802A OPEN WOUND OF LOWER LIMB, LEFT, INITIAL ENCOUNTER: ICD-10-CM

## 2023-05-03 DIAGNOSIS — E87.6 HYPOKALEMIA: ICD-10-CM

## 2023-05-03 DIAGNOSIS — M79.89 LEG SWELLING: ICD-10-CM

## 2023-05-03 DIAGNOSIS — R79.82 CRP ELEVATED: ICD-10-CM

## 2023-05-03 DIAGNOSIS — D64.9 ANEMIA, UNSPECIFIED TYPE: Primary | ICD-10-CM

## 2023-05-03 DIAGNOSIS — Z12.11 COLON CANCER SCREENING: ICD-10-CM

## 2023-05-03 DIAGNOSIS — M79.662 PAIN OF LEFT LOWER LEG: ICD-10-CM

## 2023-05-03 DIAGNOSIS — R79.82 ELEVATED C-REACTIVE PROTEIN (CRP): ICD-10-CM

## 2023-05-03 LAB
CRP SERPL-MCNC: 48.04 MG/L
D DIMER PPP FEU-MCNC: 0.67 UG/ML FEU (ref 0–0.5)
ERYTHROCYTE [SEDIMENTATION RATE] IN BLOOD BY WESTERGREN METHOD: 66 MM/HR (ref 0–20)
FERRITIN SERPL-MCNC: 43 NG/ML (ref 31–409)
IRON BINDING CAPACITY (ROCHE): 368 UG/DL (ref 240–430)
IRON SATN MFR SERPL: 8 % (ref 15–46)
IRON SERPL-MCNC: 30 UG/DL (ref 61–157)

## 2023-05-03 PROCEDURE — 86140 C-REACTIVE PROTEIN: CPT

## 2023-05-03 PROCEDURE — 82728 ASSAY OF FERRITIN: CPT | Performed by: INTERNAL MEDICINE

## 2023-05-03 PROCEDURE — 85652 RBC SED RATE AUTOMATED: CPT

## 2023-05-03 PROCEDURE — 85379 FIBRIN DEGRADATION QUANT: CPT

## 2023-05-03 PROCEDURE — 36415 COLL VENOUS BLD VENIPUNCTURE: CPT

## 2023-05-03 PROCEDURE — 83540 ASSAY OF IRON: CPT | Performed by: INTERNAL MEDICINE

## 2023-05-03 PROCEDURE — 99214 OFFICE O/P EST MOD 30 MIN: CPT | Performed by: INTERNAL MEDICINE

## 2023-05-03 PROCEDURE — 83550 IRON BINDING TEST: CPT | Performed by: INTERNAL MEDICINE

## 2023-05-03 RX ORDER — POTASSIUM CHLORIDE 750 MG/1
10 TABLET, EXTENDED RELEASE ORAL DAILY
Qty: 90 TABLET | Refills: 2 | Status: SHIPPED | OUTPATIENT
Start: 2023-05-03 | End: 2023-06-26

## 2023-05-03 ASSESSMENT — PAIN SCALES - GENERAL: PAINLEVEL: MILD PAIN (3)

## 2023-05-03 NOTE — PATIENT INSTRUCTIONS
Keep taking potassium 2 pills once a day in the morning.    Increase your Lasix to 2 tablets in the afternoon or evening that is 40 in the afternoon.    We need to repeat your potassium again in 4 weeks when you come back    Tell the nurse to apply Vaseline ointment on your legs.    Take the metolazone water pill half an hour before your Lasix dose twice a day    Notify doctor if you have fever body aches chills dizziness lightheadedness or other concerns

## 2023-05-03 NOTE — PROGRESS NOTES
Assessment & Plan   Problem List Items Addressed This Visit        Other    Lymphedema    Relevant Orders    Physical Therapy Referral    Vascular Medicine Referral   Other Visit Diagnoses     Anemia, unspecified type    -  Primary    Relevant Orders    Ferritin (Completed)    Iron and iron binding capacity (Completed)    Colon cancer screening        Pain of left lower leg        Relevant Medications    cephALEXin (KEFLEX) 250 MG capsule    Elevated C-reactive protein (CRP)        Relevant Medications    cephALEXin (KEFLEX) 250 MG capsule    Open wound of lower limb, left, initial encounter        Relevant Orders    Wound Care Referral    Vascular Medicine Referral         Increase Lasix to 40 mg in the afternoon; continue take potassium pills; 2 tablets daily,  Is taking fiber powder , dissolve in water daily.  Also is on MiraLAX once a day.    Stools are not hard.  Possible hemorrhoids.  He has a drop in his hemoglobin hematocrit,   -->check stool fit test.      Pulse ox and blood pressure; hemodynamics are stable has some soft crackles on auscultation of the lungs. Keep on diuretics,    Has significant lymphedema , left lower more than lower more than right lower extremity; both lower extremities are wrapped with gauze and has some compression ROLL.  --->Will refer to lymphedema clinic with PT as well as wound clinic,  --> he has an open sore on left lower extremity medial shin area (was crusted and then started oozing) and there is some (pain surrounding) with the elevated inflammatory marker  -->no evidence of clear cellulitis but concerning with the pain and swelling  -->  cover with Keflex for 5 days advised may cause diarrhea make sure he is taking probiotics.    Hopefully with increasing the dose of Lasix the swelling will come down as well.    Follow-up with vascular surgery- we will put a referral also for vascular medicine as well to see patient given his multiple cardiovascular risk factors;  "lymphedema and prior venous surgery in his legs.    Keep taking potassium supplement,   repeat potassium in 4 weeks.  Follow-up in 4 weeks and as needed               Anne Heath MD  [facility] HERNAN Zamorano is a 82 year old, presenting for the following health issues:  Follow Up (Reported fever at 99.3 lasted less than 24hrs)    History of Present Illness       Reason for visit:  Review problem with severe edima of the legs    He eats 0-1 servings of fruits and vegetables daily.He consumes 2 sweetened beverage(s) daily.He exercises with enough effort to increase his heart rate 10 to 19 minutes per day.  He exercises with enough effort to increase his heart rate 7 days per week.   He is taking medications regularly.     Erich is presenting for follow-up.  He is getting his legs wrapped by the RN.  He had no recurrence of fever.  Has usual shortness of breath.  No GI symptoms.  No blood in the stools.  His labs shows slightly elevated ESR and decreasing CRP.  He has anemia that is new.  He is on chronic anticoagulation with warfarin.  Not dizzy or lightheaded.  Slightly fatigued.    No blood in the stools, he is due to check his stool fit test.    Drinks 40 to 50 ounce a day.   has difficulty with completion of his defecation, he has to \"wipe to completely empty his bowels\".    No issues with the voiding at this time.          Review of Systems   Constitutional, HEENT, cardiovascular, pulmonary, gi and gu systems are negative, except as otherwise noted.      Objective    /77 (BP Location: Left arm, Patient Position: Sitting, Cuff Size: Adult Large)   Pulse 78   Temp 98  F (36.7  C) (Oral)   Resp 22   Ht 1.829 m (6')   Wt 123 kg (271 lb 1.6 oz)   SpO2 94%   BMI 36.77 kg/m    Body mass index is 36.77 kg/m .  Physical Exam   General appearance not in acute distress alert oriented x3 pleasant gentleman.  Neck no neck vein distention  Lungs some soft crackles bilaterally but " good air entry bilateral.  Not in any distress.  Heart regular rate and rhythm  Abdomen soft nontender nondistended  Extremities positive lymphedema bilateral lower extremities, wrapped with gauze and compression bandages.  He has a open sore on the distal left lower extremity anteromedial chin area covered with a foam there are some tenderness surrounding but no redness there is some cracked dry skin on both distal lower extremities.  feet adequate perfused ,color is pink.  Neurologic grossly intact    Lab on 05/03/2023   Component Date Value Ref Range Status     CRP Inflammation 05/03/2023 48.04 (H)  <5.00 mg/L Final     D-Dimer Quantitative 05/03/2023 0.67 (H)  0.00 - 0.50 ug/mL FEU Final     Erythrocyte Sedimentation Rate 05/03/2023 66 (H)  0 - 20 mm/hr Final

## 2023-05-03 NOTE — RESULT ENCOUNTER NOTE
Reviewed lab results with the patient, CRP trending down, D-dimer slightly positive patient is on Coumadin, so that will affect the results.  INR is therapeutic.  ESR slight increase/started patient on antibiotics with Keflex.    Dr Heath

## 2023-05-04 ENCOUNTER — TELEPHONE (OUTPATIENT)
Dept: OTHER | Facility: CLINIC | Age: 83
End: 2023-05-04
Payer: MEDICARE

## 2023-05-04 ENCOUNTER — TELEPHONE (OUTPATIENT)
Dept: WOUND CARE | Facility: CLINIC | Age: 83
End: 2023-05-04
Payer: MEDICARE

## 2023-05-04 NOTE — TELEPHONE ENCOUNTER
Pt referred to [facility] by Anne Heath MD for lymphedema.    Pt needs to be scheduled for NEW VASCULAR PATIENT consult with Dr. Reddy or Dr. Pardo.  Will route to scheduling to coordinate an appointment at next available.    Appt note: Ref by Dr. Anne Heath for lymphedema; notes in Epic.    Noelle Jovel, NELAN, RN-[facility]

## 2023-05-04 NOTE — RESULT ENCOUNTER NOTE
Real Zamorano, it was very nice seeing you in the clinic again,    I reviewed labs that I added including iron level in the blood and was slightly low at 30..    Please do the stool fit test to make sure there is no blood in the stools from hemorrhoids for example.    I do recommend that you take iron supplements such as SLO iron 1 tablet every other day , take with vitamin C or orange juice.  You can get such over-the-counter or I can write for prescription.  Please let me know.    Ferritin which is iron stores in the body is low normal at 43, normal ranges between .    Any further questions please let me know    Dr Heath

## 2023-05-04 NOTE — TELEPHONE ENCOUNTER
Consult received via Workqueue from Anne Heath MD in  [facility]/ for wound of the leg.    Please schedule with Dr. Rangel, Krupa Woodward, or Dr. Merritt at [facility] for next available appointment.    **If scheduling with Krupa HOLGUIN or Dr. Martinez please schedule a follow up 2-3 weeks after initial appointment.    Is the patient able to make their own medical decisions? yes    Is patient a PORTIA lift? PLEASE INQUIRE WHEN MAKING THE APPOINTMENT AND PUT IN APPOINTMENT NOTES    Routing to  Wound Healing Scheduling.

## 2023-05-05 NOTE — TELEPHONE ENCOUNTER
TO PCP:     See below pt requesting     1) link to do evaluation from visit (unsure how to provide this?)     2) Rx for Slo iron as recommended per lab result notes - pended Rx    Keisha MARTIN, Triage RN  [facility]

## 2023-05-05 NOTE — TELEPHONE ENCOUNTER
Provider called patient to discuss his concerns.  He is on antibiotics currently.  He has increased Lasix dose to 40 mg in the afternoon.  He is getting compression wraps by home health nurse.  As per RN there is no increased redness-swelling has decreased  All questions answered.

## 2023-05-08 LAB — HEMOCCULT STL QL IA: NEGATIVE

## 2023-05-10 ENCOUNTER — OFFICE VISIT (OUTPATIENT)
Dept: OTHER | Facility: CLINIC | Age: 83
End: 2023-05-10
Attending: INTERNAL MEDICINE
Payer: MEDICARE

## 2023-05-10 VITALS
HEART RATE: 81 BPM | OXYGEN SATURATION: 93 % | WEIGHT: 265.2 LBS | HEIGHT: 72 IN | BODY MASS INDEX: 35.92 KG/M2 | DIASTOLIC BLOOD PRESSURE: 77 MMHG | SYSTOLIC BLOOD PRESSURE: 137 MMHG

## 2023-05-10 DIAGNOSIS — I48.0 PAROXYSMAL A-FIB (H): ICD-10-CM

## 2023-05-10 DIAGNOSIS — I26.99 PE (PULMONARY THROMBOEMBOLISM) (H): ICD-10-CM

## 2023-05-10 DIAGNOSIS — I50.32 CHRONIC DIASTOLIC HEART FAILURE (H): ICD-10-CM

## 2023-05-10 DIAGNOSIS — S81.802S OPEN WOUND OF LEFT LOWER EXTREMITY, SEQUELA: ICD-10-CM

## 2023-05-10 DIAGNOSIS — I89.0 LYMPHEDEMA: Primary | ICD-10-CM

## 2023-05-10 DIAGNOSIS — Z79.01 LONG TERM CURRENT USE OF ANTICOAGULANTS WITH INR GOAL OF 2.0-3.0: ICD-10-CM

## 2023-05-10 DIAGNOSIS — I82.4Z9 DEEP VEIN THROMBOSIS (DVT) OF DISTAL VEIN OF LOWER EXTREMITY, UNSPECIFIED CHRONICITY, UNSPECIFIED LATERALITY (H): ICD-10-CM

## 2023-05-10 DIAGNOSIS — I83.893 VARICOSE VEINS OF BILATERAL LOWER EXTREMITIES WITH OTHER COMPLICATIONS: ICD-10-CM

## 2023-05-10 DIAGNOSIS — I10 BENIGN ESSENTIAL HYPERTENSION: ICD-10-CM

## 2023-05-10 DIAGNOSIS — G47.33 OSA (OBSTRUCTIVE SLEEP APNEA): ICD-10-CM

## 2023-05-10 PROCEDURE — G0463 HOSPITAL OUTPT CLINIC VISIT: HCPCS

## 2023-05-10 PROCEDURE — 99205 OFFICE O/P NEW HI 60 MIN: CPT | Performed by: INTERNAL MEDICINE

## 2023-05-10 NOTE — PROGRESS NOTES
Patient is here to discuss consult    /77 (BP Location: Right arm, Patient Position: Chair, Cuff Size: Adult Large)   Pulse 81   Ht 6' (1.829 m)   Wt 265 lb 3.2 oz (120.3 kg)   SpO2 93%   BMI 35.97 kg/m      Questions patient would like addressed today are: N/A.    Refills are needed: No    Has homecare services and agency name:  Varsha GRANGER

## 2023-05-10 NOTE — PROGRESS NOTES
[facility] INITIAL VASCULAR MEDICINE CONSULT    ( New Patient visit)       PRIMARY HEALTH CARE PROVIDER:  Anne Heath MD      REFERRING HEALTH CARE PROVIDER;  Anne Heath MD      REASON FOR CONSULT: Evaluation and management of known history of bilateral lower extremity varicose veins and 1 episode of provoked right lower extremity DVT underwent multiple left lower extremity venous ablations and wants right lower extremity venous ablation by outside system Dr. 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 Palencia outside the system now dealing with bilateral lower extremity swelling and ulcer on the left lower leg area and also recent redness erythema treated with antibiotics and completed course yesterday.  He denies any fever, chills.  He has a home health nurse helping leg wraps and is scheduled to see edema therapist next week and also he was referred to see wound care clinic and has not made any appointment yet.  He has been taking warfarin and maintaining INR therapeutic range and he has multiple indications for chronic long-term anticoagulation.  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 Started in 2012     Hypothyroidism      Lymphedema      Mumps      Obesity, Class III, BMI 40-49.9 (morbid obesity) (H)      RIN (obstructive sleep apnea)     has refused CPAP     Pacemaker      Paroxysmal A-fib (H)      PE (pulmonary thromboembolism) (H)        CURRENT MEDICATIONS  acetaminophen (TYLENOL) 500 MG tablet, Take 1,000 mg by mouth 3 times daily  Acetylcarnitine HCl (ACETYL L-CARNITINE PO), Take 1 capsule by mouth daily  albuterol (PROAIR HFA/PROVENTIL HFA/VENTOLIN HFA) 108 (90 Base) MCG/ACT inhaler, Inhale 2 puffs into the lungs 4 times daily as needed for shortness of breath or wheezing  aspirin (ASA) 81 MG chewable tablet, Take 1 tablet by mouth daily  atorvastatin (LIPITOR) 40 MG tablet, Take 1 tablet (40 mg) by mouth every evening  diltiazem ER COATED BEADS (CARDIZEM CD/CARTIA XT) 300 MG 24 hr capsule, Take 1 capsule (300 mg) by mouth daily  ferrous sulfate (SLO-FE) 142 (45 Fe) MG CR tablet, Take 1 tablet (142 mg) by mouth every other day  Fluticasone-Umeclidin-Vilanterol (TRELEGY ELLIPTA) 200-62.5-25 MCG/INH oral inhaler, Inhale 1 puff into the lungs daily  furosemide (LASIX) 20 MG tablet, Take 1 tablet (20 mg) by mouth every evening (Patient taking  differently: Take 40 mg by mouth every evening)  furosemide (LASIX) 40 MG tablet, Take 1 tablet (40 mg) by mouth every morning  isosorbide mononitrate (IMDUR) 30 MG 24 hr tablet, Take 1 tablet (30 mg) by mouth daily  levothyroxine (SYNTHROID/LEVOTHROID) 175 MCG tablet, TAKE 1 TABLET(175 MCG) BY MOUTH DAILY  lisinopril (ZESTRIL) 20 MG tablet, Take 0.5 tablets (10 mg) by mouth daily (Patient taking differently: Take 40 mg by mouth daily)  metolazone (ZAROXOLYN) 2.5 MG tablet, TAKE 1 TABLET(2.5 MG) BY MOUTH TWICE DAILY  Multiple vitamin TABS, Take 1 tablet by mouth daily  polyethylene glycol (MIRALAX) 17 g packet, Take 17 g by mouth daily  potassium chloride ER (KLOR-CON M) 10 MEQ CR tablet, Take 1 tablet (10 mEq) by mouth daily  spironolactone (ALDACTONE) 25 MG tablet, TAKE 1 TABLET(25 MG) BY MOUTH EVERY OTHER DAY  tamsulosin (FLOMAX) 0.4 MG capsule, TAKE 1 CAPSULE(0.4 MG) BY MOUTH DAILY  TAURINE PO, Take 1 capsule by mouth daily  venlafaxine (EFFEXOR) 75 MG tablet, TAKE 1 TABLET(75 MG) BY MOUTH TWICE DAILY  warfarin ANTICOAGULANT (COUMADIN) 5 MG tablet, As of January 23, 2023 - take 7.5 mg every Wed, Sat; 5 mg all other days of the week. Takes in the morning.  Or as directed by the INR clinic. INR DUE for further refills. (Patient taking differently: take 7.5 mg every Wed, Sat; 5 mg all other days of the week. Recheck INR on 3/20)  cephALEXin (KEFLEX) 250 MG capsule, Take 1 capsule (250 mg) by mouth 3 times daily (Patient not taking: Reported on 5/10/2023)    No current facility-administered medications on file prior to visit.      PAST SURGICAL HISTORY:  Past Surgical History:   Procedure Laterality Date     cardiac stenting       CV RIGHT HEART CATH MEASUREMENTS RECORDED N/A 11/5/2019    Procedure: Right Heart Cath;  Surgeon: Roberto Hernandez MD;  Location:  HEART CARDIAC CATH LAB     ENT SURGERY      tonsillectomy     IMPLANT PACEMAKER       INJECT NERVE BLOCK SUPRASCAPULAR Bilateral 8/26/2021    Procedure:  BLOCK, NERVE, SUPRASCAPULAR, Bilateral, under ultrasound guidance;  Surgeon: Swetha Allen MD;  Location: [facility] OR     INJECT NERVE BLOCK SUPRASCAPULAR Bilateral 9/23/2021    Procedure: bilateral suprascapular nerve block;  Surgeon: Swetha Allen MD;  Location: [facility] OR     INJECT NERVE BLOCK SUPRASCAPULAR Left 9/15/2022    Procedure: BLOCK, NERVE, SUPRASCAPULAR- left;  Surgeon: Swetha Allen MD;  Location: [facility] OR     INJECT NERVE BLOCK SUPRASCAPULAR Left 10/20/2022    Procedure: BLOCK, NERVE, SUPRASCAPULAR- left;  Surgeon: Swetha Allen MD;  Location: [facility] OR     INJECT NERVE BLOCK SUPRASCAPULAR Left 1/5/2023    Procedure: BLOCK, NERVE, SUPRASCAPULAR (left);  Surgeon: Swetha Allen MD;  Location: [facility] OR     NERVE BLOCK PERIPHERAL Bilateral 3/10/2022    Procedure: bilateral suprascapular nerve block;  Surgeon: Swetha Allen MD;  Location: [facility] OR     ORTHOPEDIC SURGERY Right 2012    knee replacement     REVERSE ARTHROPLASTY SHOULDER Left 2/21/2023    Procedure: ARTHROPLASTY, SHOULDER, TOTAL, REVERSE LEFT;  Surgeon: Katelyn Vasquez MD;  Location: [facility] OR       ALLERGIES     Allergies   Allergen Reactions     No Known Allergies        FAMILY HISTORY  Family History   Problem Relation Age of Onset     Brain Cancer Mother      Cerebrovascular Disease Father      Hypertension Father      Anesthesia Reaction No family hx of      Thrombosis No family hx of        VASCULAR FAMILY HISTORY  1st order relative with atherosclerotic PAD: No  1st order relative with AAA: No  Family history of Familial Hyperlipidemia No  Family History of Hypercoagulable state:No    VASCULAR RISK FACTORS  1. Diabetes:No   2. Smoking: quit smoking some time ago.  3. HTN: controlled  4.Hyperlipidemia: Yes - controlled      SOCIAL HISTORY  Social History     Socioeconomic History     Marital status:      Spouse name: Not on file     Number of children: 5     Years of education: Not on file     Highest education level: Not on  file   Occupational History     Occupation: retired teacher at [id number]   Tobacco Use     Smoking status: Former     Packs/day: 0.50     Years: 25.00     Pack years: 12.50     Types: Cigarettes     Start date:      Quit date:      Years since quittin.3     Smokeless tobacco: Never   Vaping Use     Vaping status: Never Used   Substance and Sexual Activity     Alcohol use: No     Comment: quit in ; recovering     Drug use: No     Sexual activity: Not Currently     Partners: Female   Other Topics Concern     Parent/sibling w/ CABG, MI or angioplasty before 65F 55M? Not Asked   Social History Narrative     Not on file     Social Determinants of Health     Financial Resource Strain: Not on file   Food Insecurity: Not on file   Transportation Needs: Not on file   Physical Activity: Not on file   Stress: Not on file   Social Connections: Not on file   Intimate Partner Violence: Not on file   Housing Stability: Not on file       ROS:   General: No change in weight, sleep or appetite.  Normal energy.  No fever or chills  Eyes: Negative for vision changes or eye problems  ENT: No problems with ears, nose or throat.  No difficulty swallowing.  Resp: No coughing, wheezing or shortness of breath  CV: No chest pains or palpitations  GI: No nausea, vomiting,  heartburn, abdominal pain, diarrhea, constipation or change in bowel habits  : No urinary frequency or dysuria, bladder or kidney problems  Musculoskeletal: No significant muscle or joint pains  Neurologic: No headaches, numbness, tingling, weakness, problems with balance or coordination  Psychiatric: No problems with anxiety, depression or mental health  Heme/immune/allergy: No history of bleeding or clotting problems or anemia.  No allergies or immune system problems  Endocrine: No history of thyroid disease, diabetes or other endocrine disorders  Skin: No rashes,worrisome lesions or skin problems  Vascular: Bilateral lower extremity varicose  veins left side underwent ablation interventions 3 times and right side once outside the system  History of right lower extremity DVT provoked in 2012  Bilateral lower extremity lymphedema  Recently developed leg ulcer a month ago  Recently treated for cellulitis of left lower extremity  EXAM:  /77 (BP Location: Left arm, Patient Position: Chair, Cuff Size: Adult Large)   Pulse 81   Ht 6' (1.829 m)   Wt 265 lb 3.2 oz (120.3 kg)   SpO2 93%   BMI 35.97 kg/m    In general, the patient is a pleasant male in no apparent distress.    HEENT: NC/AT.  PERRLA.  EOMI.  Sclerae white, not injected.  Nares clear.  Pharynx without erythema or exudate.  Dentition intact.    Neck: No adenopathy.  No thyromegaly. Carotids +2/2 bilaterally without bruits.  No jugular venous distension.   Heart: RRR. Normal S1, S2 splits physiologically. No murmur, rub, click, or gallop. The PMI is in the 5th ICS in the midclavicular line. There is no heave.    Lungs: CTA.  No ronchi, wheezes, rales.  No dullness to percussion.   Abdomen: Soft, nontender, nondistended. No organomegaly. No AAA.  No bruits.   Extremities: Vascular:  Bilateral lower extremity varicose veins with chronic venous stasis dermatitis and also leg also with intermittent bleeding  Resolved cellulitis  Palpable symmetrical peripheral pulses in both lower extremities  Classical features of lymphedema with venous insufficiency in both legs, dorsal hump of both feet, squaring of the toes, positive Stemmer sign and loss of contour of the legs      Labs:  LIPID RESULTS:  Lab Results   Component Value Date    CHOL 170 05/01/2023    CHOL 138 02/19/2021    HDL 53 05/01/2023    HDL 44 02/19/2021     (H) 05/01/2023    LDL 52 02/19/2021    TRIG 82 05/01/2023    TRIG 209 (H) 02/19/2021       LIVER ENZYME RESULTS:  Lab Results   Component Value Date    AST 19 05/01/2023    AST 14 02/19/2021    ALT 13 05/01/2023    ALT 26 02/19/2021       CBC RESULTS:  Lab Results   Component  Value Date    WBC 9.8 05/01/2023    WBC 10.2 06/14/2021    RBC 3.63 (L) 05/01/2023    RBC 3.68 (L) 06/14/2021    HGB 10.5 (L) 05/01/2023    HGB 12.2 (L) 06/14/2021    HCT 33.8 (L) 05/01/2023    HCT 36.7 (L) 06/14/2021    MCV 93 05/01/2023     06/14/2021    MCH 28.9 05/01/2023    MCH 33.2 (H) 06/14/2021    MCHC 31.1 (L) 05/01/2023    MCHC 33.2 06/14/2021    RDW 16.1 (H) 05/01/2023    RDW 15.6 (H) 06/14/2021     05/01/2023     06/14/2021       BMP RESULTS:  Lab Results   Component Value Date     05/01/2023     06/21/2021    POTASSIUM 3.9 05/01/2023    POTASSIUM 3.5 10/18/2022    POTASSIUM 4.1 06/21/2021    CHLORIDE 95 (L) 05/01/2023    CHLORIDE 99 10/13/2022    CHLORIDE 103 06/21/2021    CO2 30 (H) 05/01/2023    CO2 31 10/13/2022    CO2 29 06/21/2021    ANIONGAP 12 05/01/2023    ANIONGAP 8 10/13/2022    ANIONGAP 6 06/21/2021    GLC 79 05/01/2023     (H) 02/23/2023     (H) 10/13/2022     (H) 06/21/2021    BUN 24.2 (H) 05/01/2023    BUN 43 (H) 10/13/2022    BUN 64 (H) 06/21/2021    CR 1.10 05/01/2023    CR 1.57 (H) 06/21/2021    GFRESTIMATED 67 05/01/2023    GFRESTIMATED 40 (L) 06/21/2021    GFRESTBLACK 47 (L) 06/21/2021    LAWSON 9.8 05/01/2023    LAWSON 9.4 06/21/2021        A1C RESULTS:  Lab Results   Component Value Date    A1C 5.6 04/19/2023    A1C 6.1 (H) 06/21/2021       THYROID RESULTS:  Lab Results   Component Value Date    TSH 5.04 (H) 04/19/2023    TSH 3.07 10/13/2022    TSH 0.71 06/14/2021       Procedures:   Reviewed previous extensive imaging studies in the epic and updated chart    Assessment and Plan:     1. Lymphedema ( phlebo-Lymphedema)   - Vascular Medicine Referral    2. Varicose veins of bilateral lower extremities with other complications s/p multiple ablations on left leg and one ablation on Rt leg  by Dr. Palencia out side system     3. Deep vein thrombosis (DVT)  RLE after knee replacement 2012  (H)    4. Open wound of left lower extremity,  sequela    5. PE (pulmonary thromboembolism) (H)    6. Long term current use of anticoagulants with INR goal of 2.0-3.0    7. Chronic diastolic heart failure (H)    8. RIN (obstructive sleep apnea)    9. Paroxysmal A-fib (H)    10. 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 and maintaining INR therapeutic range.  He was recently treated with antibiotics for cellulitis which is resolved.  He is scheduled to see lymphedema therapist next week and also was supposed to make an appointment to see the wound care clinic has not done yet.  He denies any fever, chills  Last dose of warfarin was this morning.  He is interested in switching to DOACs.  Recent INR 2.1  He has no clinical signs of arterial insufficiency palpable pulses  He quit smoking long ago  A1c was 6.1  Blood pressure is decent range and taking appropriate medications  He underwent multiple ablations in the legs before unclear anymore ablations will be helpful in this patient.  I had a lengthy discussion with the patient regarding appropriate compression, MLD elevation, wound care, wraps and possibly pump therapy in the future etc.    At present my recommendations,  Please see edema therapist as scheduled next week, local care, elevation of the legs, compression etc.  Follow-up with the wound care clinic  Stop warfarin  Starting tomorrow evening take Xarelto 20 mg daily with dinner new prescription sent educated patient  No need for INR checks anymore  Suggested lymphatic formula 1 capsule twice a day for 2 months then followed by 1 capsule daily information  sheet given  Continue rest of the medications same  Follow-up with Phyllis Orr PA-C at [facility] in 2 to 3 months    Thank you for the consultation !    This note was dictated by utilizing Dragon software    Copy of this note to primary care physician    60  minutes spent on the date of the encounter doing chart review, history and exam, documentation, and further activities as noted above.  He is new to this clinic reviewed available extensive records in the epic and updated chart.  AVS with written instructions given  He had a lot of questions and all of them were answered      Monty Pardo MD, FAHA, FSVM, FNLA, FACP  Vascular Medicine  Clinical hypertension specialist  Clinical lipidologist

## 2023-05-10 NOTE — PATIENT INSTRUCTIONS
Stop warfarin and starting tomorrow take xarelto 20 mg daily with dinner     No need for INR checks     See edema therapist, wound care clinic     Compression, local care as you are and elevate legs     Take lymphatic formula one pill twice a day for 2 months then decrease to one pill a day     Follow up in 2-3 months with jazmine Orr PA-C at our vascular center     Continue rest of the medications same

## 2023-05-12 ENCOUNTER — TELEPHONE (OUTPATIENT)
Dept: CARDIOLOGY | Facility: CLINIC | Age: 83
End: 2023-05-12
Payer: MEDICARE

## 2023-05-12 DIAGNOSIS — E78.5 HYPERLIPIDEMIA LDL GOAL <100: ICD-10-CM

## 2023-05-12 DIAGNOSIS — Z96.612 STATUS POST REVERSE ARTHROPLASTY OF SHOULDER, LEFT: Primary | ICD-10-CM

## 2023-05-12 NOTE — TELEPHONE ENCOUNTER
"Reviewed lipids/alt showing    Latest Ref Rng 5/1/2023  8:26 AM   !LIPID/HEPATIC     Cholesterol <200 mg/dL 170    Triglycerides <150 mg/dL 82    HDL Cholesterol >=40 mg/dL 53    LDL Cholesterol Calculated <=100 mg/dL 101 (H)    Non HDL Cholesterol <130 mg/dL 117    AST 10 - 50 U/L 19    ALT 10 - 50 U/L 13    CRP Inflammation 0.0 - 8.0 mg/L    CRP Inflammation <5.00 mg/L 77.80 (H)       Legend:  (H) High    Per office note dated 2/13/23, Dr. Portillo recommended, \"His lipid profile was excellent on 08/02/2022 with an LDL of 63, HDL of 48 and triglycerides of 92 with a total cholesterol of 129.\"     Called pt with results, states he is taking the atorvastatin 40 mg daily without complications. He states he follow a low fat diet but does not exercise much after shoulder surgery & he is dealing with increased swelling in his feet & ankles.     Will message Dr. Portillo to review. Jessica RN     "

## 2023-05-15 ENCOUNTER — TELEPHONE (OUTPATIENT)
Dept: OTHER | Facility: CLINIC | Age: 83
End: 2023-05-15
Payer: MEDICARE

## 2023-05-15 RX ORDER — ATORVASTATIN CALCIUM 80 MG/1
80 TABLET, FILM COATED ORAL EVERY EVENING
Qty: 90 TABLET | Refills: 3 | Status: SHIPPED | OUTPATIENT
Start: 2023-05-15 | End: 2024-02-02

## 2023-05-15 NOTE — TELEPHONE ENCOUNTER
Follow-up to 5/10/23 with Dr. Pardo         In clinic visit with Phyllis Orr PA-C in 2-3 months.  No labs.

## 2023-05-15 NOTE — TELEPHONE ENCOUNTER
Patient returned call and RN reviewed with him his lab results and Dr. Portillo's recommendations below. Patient verbalized understanding and is in agreement with plan. RN sent in new rx for atorvastatin and also placed orders for lab work in 6 weeks. RN transferred patient to scheduling to arrange lab work.

## 2023-05-15 NOTE — TELEPHONE ENCOUNTER
RN called patient and left VM requesting callback to discuss his lab results and Dr. Portillo's recommendations.

## 2023-05-17 ENCOUNTER — OFFICE VISIT (OUTPATIENT)
Dept: ORTHOPEDICS | Facility: CLINIC | Age: 83
End: 2023-05-17
Payer: MEDICARE

## 2023-05-17 ENCOUNTER — ANCILLARY PROCEDURE (OUTPATIENT)
Dept: GENERAL RADIOLOGY | Facility: CLINIC | Age: 83
End: 2023-05-17
Attending: ORTHOPAEDIC SURGERY
Payer: MEDICARE

## 2023-05-17 ENCOUNTER — DOCUMENTATION ONLY (OUTPATIENT)
Dept: ANTICOAGULATION | Facility: CLINIC | Age: 83
End: 2023-05-17

## 2023-05-17 DIAGNOSIS — I48.0 PAROXYSMAL A-FIB (H): ICD-10-CM

## 2023-05-17 DIAGNOSIS — Z96.612 STATUS POST REVERSE ARTHROPLASTY OF SHOULDER, LEFT: ICD-10-CM

## 2023-05-17 DIAGNOSIS — I82.4Z9 DEEP VEIN THROMBOSIS (DVT) OF DISTAL VEIN OF LOWER EXTREMITY, UNSPECIFIED CHRONICITY, UNSPECIFIED LATERALITY (H): ICD-10-CM

## 2023-05-17 DIAGNOSIS — I26.99 PE (PULMONARY THROMBOEMBOLISM) (H): Primary | ICD-10-CM

## 2023-05-17 DIAGNOSIS — Z96.612 STATUS POST REVERSE ARTHROPLASTY OF SHOULDER, LEFT: Primary | ICD-10-CM

## 2023-05-17 DIAGNOSIS — Z79.01 LONG TERM CURRENT USE OF ANTICOAGULANTS WITH INR GOAL OF 2.0-3.0: ICD-10-CM

## 2023-05-17 PROCEDURE — 73030 X-RAY EXAM OF SHOULDER: CPT | Mod: LT | Performed by: RADIOLOGY

## 2023-05-17 PROCEDURE — 99024 POSTOP FOLLOW-UP VISIT: CPT | Mod: GC | Performed by: ORTHOPAEDIC SURGERY

## 2023-05-17 NOTE — LETTER
5/17/2023         RE: Erich Craven  [address]  [place] [address]        Dear Colleague,    Thank you for referring your patient, Erich Craven, to the [facility] [place]. Please see a copy of my visit note below.    CHIEF CONCERN: Status post left reverse total shoulder arthroplasty  DATE OF SURGERY: 2/21/23    HISTORY OF PRESENT ILLNESS: Mr. Craven is an extremely pleasant 83 year-old man who is 3 months status post the above procedure. His pain has significantly improved compared to preop. Doing well, has returned home, living alone with his dog. Very pleased with his progress. Able to complete all activities of daily living. Has completed his PT course and performs exercises on his own at home regularly.     EXAM:  Pleasant adult male in NAD  Respirations even and unlabored.  Left upper extremity:  Distally neurovascularly intact without deficits. Shoulder range of motion is assisted FE to 130, ER at side to 20, and IR to back pocket. Abduction to about 90 degrees.   Right upper extremity: Shoulder range of motion is assisted FE to 130, ER at side to 30, IR to back pocket.     IMAGING:   Left shoulder XRs were reviewed today and demonstrate components are in good position with no lucencies or fractures.    ASSESSMENT:  1. Three months status post above procedure    PLAN:  The patient may resume normal activities and ROM as tolerated. I recommend continued stretches to maximize range of motion. He will follow up 1 year postop.      By signing my name below, I, Amy Aleman, attest that this documentation has been prepared under the direction of Katelyn Vasquez MD.  Electronically Signed: Huber Moran.    More Velazco MD, PGY1   Orthopaedic Surgery    Provider Disclosure:  I agree with above History, Physical exam and Plan.  I have reviewed the content of the documentation and have edited it as needed. I have personally performed the services  documented here and the documentation accurately represents those services and the decisions I have made.      Katelyn Vasquez MD

## 2023-05-17 NOTE — PROGRESS NOTES
CHIEF CONCERN: Status post left reverse total shoulder arthroplasty  DATE OF SURGERY: 2/21/23    HISTORY OF PRESENT ILLNESS: Mr. Craven is an extremely pleasant 83 year-old man who is 3 months status post the above procedure. His pain has significantly improved compared to preop. Doing well, has returned home, living alone with his dog. Very pleased with his progress. Able to complete all activities of daily living. Has completed his PT course and performs exercises on his own at home regularly.     EXAM:  Pleasant adult male in NAD  Respirations even and unlabored.  Left upper extremity:  Distally neurovascularly intact without deficits. Shoulder range of motion is assisted FE to 130, ER at side to 20, and IR to back pocket. Abduction to about 90 degrees.   Right upper extremity: Shoulder range of motion is assisted FE to 130, ER at side to 30, IR to back pocket.     IMAGING:   Left shoulder XRs were reviewed today and demonstrate components are in good position with no lucencies or fractures.    ASSESSMENT:  1. Three months status post above procedure    PLAN:  The patient may resume normal activities and ROM as tolerated. I recommend continued stretches to maximize range of motion. He will follow up 1 year postop.      By signing my name below, I, Amy Aleman, attest that this documentation has been prepared under the direction of Katelyn Vasquez MD.  Electronically Signed: Huber Moran.    More Velazco MD, PGY1   Orthopaedic Surgery    Provider Disclosure:  I agree with above History, Physical exam and Plan.  I have reviewed the content of the documentation and have edited it as needed. I have personally performed the services documented here and the documentation accurately represents those services and the decisions I have made.      Katelyn Vasquez MD

## 2023-05-17 NOTE — NURSING NOTE
Reason For Visit:   Chief Complaint   Patient presents with     Surgical Followup     3 month post op DOS: 2/21/23 Left reverse TSA        PCP: Anne Heath  Ref:      ?  No  Occupation Retired .  Currently working? No.  Work status?  Retired.  Date of injury: NA  Type of injury: NA.  Date of surgery: 2/21/23  Type of surgery: Left reverse total shoulder arthroplasty  Smoker: No  Request smoking cessation information: No     Right hand dominant    SANE score  Affected shoulder: Left  Right shoulder SANE: 90  Left shoulder SANE: 80    There were no vitals taken for this visit.      Pain Assessment  Patient Currently in Pain: Tika Alexander, ATC

## 2023-05-17 NOTE — PROGRESS NOTES
ANTICOAGULATION  MANAGEMENT    Erich Craven is being discharged from the [facility] ([facility]).    Reason for discharge: warfarin replaced by alternate therapy, Xarelto per 5/10/23 vascular notes.    Anticoagulation episode resolved, ACC referral closed and Standing order discontinued    If patient needs warfarin management in the future, please send a new referral    Katelyn Stoner RN

## 2023-05-23 ENCOUNTER — ANCILLARY PROCEDURE (OUTPATIENT)
Dept: CARDIOLOGY | Facility: CLINIC | Age: 83
End: 2023-05-23
Attending: INTERNAL MEDICINE
Payer: MEDICARE

## 2023-05-23 DIAGNOSIS — I49.5 SICK SINUS SYNDROME (H): ICD-10-CM

## 2023-05-23 DIAGNOSIS — Z95.0 CARDIAC PACEMAKER IN SITU: ICD-10-CM

## 2023-05-23 PROCEDURE — 93294 REM INTERROG EVL PM/LDLS PM: CPT | Performed by: INTERNAL MEDICINE

## 2023-05-23 PROCEDURE — 93296 REM INTERROG EVL PM/IDS: CPT | Performed by: INTERNAL MEDICINE

## 2023-05-31 DIAGNOSIS — Z53.9 DIAGNOSIS NOT YET DEFINED: Primary | ICD-10-CM

## 2023-05-31 PROCEDURE — G0179 MD RECERTIFICATION HHA PT: HCPCS | Performed by: INTERNAL MEDICINE

## 2023-06-05 LAB
MDC_IDC_EPISODE_DTM: NORMAL
MDC_IDC_EPISODE_DURATION: 5 S
MDC_IDC_EPISODE_DURATION: 6 S
MDC_IDC_EPISODE_DURATION: 7 S
MDC_IDC_EPISODE_DURATION: 7 S
MDC_IDC_EPISODE_DURATION: 9 S
MDC_IDC_EPISODE_ID: NORMAL
MDC_IDC_EPISODE_TYPE: NORMAL
MDC_IDC_LEAD_IMPLANT_DT: NORMAL
MDC_IDC_LEAD_IMPLANT_DT: NORMAL
MDC_IDC_LEAD_LOCATION: NORMAL
MDC_IDC_LEAD_LOCATION: NORMAL
MDC_IDC_LEAD_LOCATION_DETAIL_1: NORMAL
MDC_IDC_LEAD_LOCATION_DETAIL_1: NORMAL
MDC_IDC_LEAD_MFG: NORMAL
MDC_IDC_LEAD_MFG: NORMAL
MDC_IDC_LEAD_MODEL: NORMAL
MDC_IDC_LEAD_MODEL: NORMAL
MDC_IDC_LEAD_POLARITY_TYPE: NORMAL
MDC_IDC_LEAD_POLARITY_TYPE: NORMAL
MDC_IDC_LEAD_SERIAL: NORMAL
MDC_IDC_LEAD_SERIAL: NORMAL
MDC_IDC_MSMT_BATTERY_DTM: NORMAL
MDC_IDC_MSMT_BATTERY_REMAINING_LONGEVITY: 30 MO
MDC_IDC_MSMT_BATTERY_REMAINING_PERCENTAGE: 47 %
MDC_IDC_MSMT_BATTERY_STATUS: NORMAL
MDC_IDC_MSMT_LEADCHNL_RA_IMPEDANCE_VALUE: 459 OHM
MDC_IDC_MSMT_LEADCHNL_RA_PACING_THRESHOLD_AMPLITUDE: 0.5 V
MDC_IDC_MSMT_LEADCHNL_RA_PACING_THRESHOLD_PULSEWIDTH: 0.4 MS
MDC_IDC_MSMT_LEADCHNL_RV_IMPEDANCE_VALUE: 393 OHM
MDC_IDC_PG_IMPLANT_DTM: NORMAL
MDC_IDC_PG_MFG: NORMAL
MDC_IDC_PG_MODEL: NORMAL
MDC_IDC_PG_SERIAL: NORMAL
MDC_IDC_PG_TYPE: NORMAL
MDC_IDC_SESS_CLINIC_NAME: NORMAL
MDC_IDC_SESS_DTM: NORMAL
MDC_IDC_SESS_TYPE: NORMAL
MDC_IDC_SET_BRADY_AT_MODE_SWITCH_MODE: NORMAL
MDC_IDC_SET_BRADY_AT_MODE_SWITCH_RATE: 150 {BEATS}/MIN
MDC_IDC_SET_BRADY_LOWRATE: 60 {BEATS}/MIN
MDC_IDC_SET_BRADY_MAX_TRACKING_RATE: 130 {BEATS}/MIN
MDC_IDC_SET_BRADY_MODE: NORMAL
MDC_IDC_SET_BRADY_PAV_DELAY_HIGH: 220 MS
MDC_IDC_SET_BRADY_PAV_DELAY_LOW: 250 MS
MDC_IDC_SET_BRADY_SAV_DELAY_HIGH: 220 MS
MDC_IDC_SET_BRADY_SAV_DELAY_LOW: 250 MS
MDC_IDC_SET_LEADCHNL_RA_PACING_AMPLITUDE: 2 V
MDC_IDC_SET_LEADCHNL_RA_PACING_POLARITY: NORMAL
MDC_IDC_SET_LEADCHNL_RA_PACING_PULSEWIDTH: 0.4 MS
MDC_IDC_SET_LEADCHNL_RA_SENSING_ADAPTATION_MODE: NORMAL
MDC_IDC_SET_LEADCHNL_RA_SENSING_POLARITY: NORMAL
MDC_IDC_SET_LEADCHNL_RA_SENSING_SENSITIVITY: 0.5 MV
MDC_IDC_SET_LEADCHNL_RV_PACING_AMPLITUDE: 3.2 V
MDC_IDC_SET_LEADCHNL_RV_PACING_CAPTURE_MODE: NORMAL
MDC_IDC_SET_LEADCHNL_RV_PACING_POLARITY: NORMAL
MDC_IDC_SET_LEADCHNL_RV_PACING_PULSEWIDTH: 0.4 MS
MDC_IDC_SET_LEADCHNL_RV_SENSING_ADAPTATION_MODE: NORMAL
MDC_IDC_SET_LEADCHNL_RV_SENSING_POLARITY: NORMAL
MDC_IDC_SET_LEADCHNL_RV_SENSING_SENSITIVITY: 1 MV
MDC_IDC_SET_ZONE_DETECTION_INTERVAL: 375 MS
MDC_IDC_SET_ZONE_TYPE: NORMAL
MDC_IDC_SET_ZONE_VENDOR_TYPE: NORMAL
MDC_IDC_STAT_AT_BURDEN_PERCENT: 1 %
MDC_IDC_STAT_AT_DTM_END: NORMAL
MDC_IDC_STAT_AT_DTM_START: NORMAL
MDC_IDC_STAT_BRADY_DTM_END: NORMAL
MDC_IDC_STAT_BRADY_DTM_START: NORMAL
MDC_IDC_STAT_BRADY_RA_PERCENT_PACED: 6 %
MDC_IDC_STAT_BRADY_RV_PERCENT_PACED: 0 %
MDC_IDC_STAT_EPISODE_RECENT_COUNT: 0
MDC_IDC_STAT_EPISODE_RECENT_COUNT: 0
MDC_IDC_STAT_EPISODE_RECENT_COUNT: 15
MDC_IDC_STAT_EPISODE_RECENT_COUNT: 20
MDC_IDC_STAT_EPISODE_RECENT_COUNT_DTM_END: NORMAL
MDC_IDC_STAT_EPISODE_RECENT_COUNT_DTM_START: NORMAL
MDC_IDC_STAT_EPISODE_TYPE: NORMAL
MDC_IDC_STAT_EPISODE_VENDOR_TYPE: NORMAL

## 2023-06-08 ENCOUNTER — NURSE TRIAGE (OUTPATIENT)
Dept: FAMILY MEDICINE | Facility: CLINIC | Age: 83
End: 2023-06-08
Payer: MEDICARE

## 2023-06-08 NOTE — TELEPHONE ENCOUNTER
Writer attempted to reach patient to clarify if patient is having new or worsening symptoms (hx of COPD) or just requesting a refill     Triage Patient Outreach    Attempt # 1    Was call answered?  No.  Left message on voicemail with information to call triage back.    On callback - please clarify if patient is having worsening breathing concerns    Also sent mychart message to patient to call back to triage     Peace Keith RN  [facility]

## 2023-06-09 NOTE — TELEPHONE ENCOUNTER
"Called patient regarding message below. He states, \"Yeah, it was over once the air quality got better. I'm fine, I just want to make sure that it's filled in case we ever have another air quality situation in the near future.\"    Writer advised patient that inhalers were sent to preferred pharmacy. Patient verbalized understanding.       Gabriela Wilder RN on 6/9/2023 at 10:00 AM    "

## 2023-06-15 ENCOUNTER — NURSE TRIAGE (OUTPATIENT)
Dept: FAMILY MEDICINE | Facility: CLINIC | Age: 83
End: 2023-06-15
Payer: MEDICARE

## 2023-06-15 NOTE — TELEPHONE ENCOUNTER
FYI-  Patients home care nurse Bina called to report 6 lbs weight gain in 4 days.  Monday patients weight was 262 lbs and now 268 lbs. Pt has bilateral 2 pitting edema. Per nurse, pt has been taking medications as prescribed including Lasix. Pt reports some SOB no chest pain or abnormal breathing sounds.     Please advice if medication changes appropriate.     Triage left voice message asking pt to call clinic back to triage symptoms/ help schedule appt if appropriate.

## 2023-06-16 NOTE — TELEPHONE ENCOUNTER
Patient Contact    Attempt # 1    Was call answered? No.    Left message for patient to call triage back.    Gabriela Louise RN

## 2023-06-16 NOTE — TELEPHONE ENCOUNTER
Nurse Triage SBAR    Is this a 2nd Level Triage? NO    Situation: Patient calling clinic back reporting bilateral leg and ankle swelling with 6 lb weight gain in 4 days. Patient stated leg swelling has gone down but weight is still at 268 lbs.     Background: Patient stated he takes lasix for previous leg swelling. Patient also stated home care removed his leg wraps and it helped with symptoms too.     Assessment: Patient did not endorse difficulty breathing at rest, no chest pain/pressure, no worsening swelling and no additional weight gain. Patient stated his leg swelling has gone down in the last few days but reported his weight is unchanged.     Protocol Recommended Disposition:   See in Office Within 3 Days    Recommendation: Per disposition, RN advised to be seen in office for evaluation. RN assisted patient to be scheduled 6/19/2023 with PCP and advised patient to be seen sooner in ED if symptoms worsen, patient agreed with plan of care and did not have any additional questions or concerns.      Does the patient meet one of the following criteria for ADS visit consideration? 16+ years old, with an [facility] PCP     TIP  Providers, please consider if this condition is appropriate for management at one of our Acute and Diagnostic Services sites.     If patient is a good candidate, please use dotphrase <dot>triageresponse and select Refer to ADS to document.      Reason for Disposition    MILD swelling of both ankles (i.e., pedal edema) AND new-onset or worsening    Additional Information    Negative: Sounds like a life-threatening emergency to the triager    Negative: Chest pain    Negative: Small area of swelling and followed an insect bite to the area    Negative: Followed a knee injury    Negative: Ankle or foot injury    Negative: Pregnant with leg swelling or edema    Negative: Difficulty breathing at rest    Negative: Entire foot is cool or blue in comparison to other side    Negative: SEVERE swelling (e.g.,  swelling extends above knee, entire leg is swollen, weeping fluid)    Negative: Thigh or calf pain and only 1 side and present > 1 hour    Negative: Thigh, calf, or ankle swelling in only one leg    Negative: Thigh, calf, or ankle swelling in both legs, but one side is definitely more swollen (Exception: longstanding difference between legs)    Negative: Cast on leg or ankle and has increasing pain    Negative: Can't walk or can barely stand (new-onset)    Negative: Fever and red area (or area very tender to touch)    Negative: Patient sounds very sick or weak to the triager    Negative: Swelling of face, arm or hands  (Exception: Slight puffiness of fingers during hot weather.)    Negative: Pregnant 20 or more weeks and sudden weight gain (i.e., > 2 lbs, 1 kg in one week)    Negative: MODERATE swelling of both ankles (e.g., swelling extends up to the knees) AND new-onset or worsening    Negative: Difficulty breathing with exertion AND worsening or new-onset    Negative: Looks like a boil, infected sore, deep ulcer, or other infected rash (spreading redness, pus)    Negative: Patient wants to be seen    Protocols used: LEG SWELLING AND EDEMA-A-OH

## 2023-06-19 ENCOUNTER — OFFICE VISIT (OUTPATIENT)
Dept: FAMILY MEDICINE | Facility: CLINIC | Age: 83
End: 2023-06-19
Payer: MEDICARE

## 2023-06-19 VITALS
HEART RATE: 69 BPM | DIASTOLIC BLOOD PRESSURE: 76 MMHG | RESPIRATION RATE: 18 BRPM | OXYGEN SATURATION: 94 % | SYSTOLIC BLOOD PRESSURE: 133 MMHG | TEMPERATURE: 97.2 F | BODY MASS INDEX: 37.03 KG/M2 | WEIGHT: 273 LBS

## 2023-06-19 DIAGNOSIS — E78.5 HYPERLIPIDEMIA LDL GOAL <100: ICD-10-CM

## 2023-06-19 DIAGNOSIS — R58 ECCHYMOSIS OF FOREARM: ICD-10-CM

## 2023-06-19 DIAGNOSIS — E11.22 TYPE 2 DIABETES MELLITUS WITH STAGE 2 CHRONIC KIDNEY DISEASE, WITHOUT LONG-TERM CURRENT USE OF INSULIN (H): ICD-10-CM

## 2023-06-19 DIAGNOSIS — I89.0 LYMPHEDEMA: Primary | ICD-10-CM

## 2023-06-19 DIAGNOSIS — N18.2 TYPE 2 DIABETES MELLITUS WITH STAGE 2 CHRONIC KIDNEY DISEASE, WITHOUT LONG-TERM CURRENT USE OF INSULIN (H): ICD-10-CM

## 2023-06-19 PROBLEM — F10.21 ALCOHOL DEPENDENCE IN REMISSION (H): Status: ACTIVE | Noted: 2023-06-19

## 2023-06-19 LAB — ERYTHROCYTE [SEDIMENTATION RATE] IN BLOOD BY WESTERGREN METHOD: 27 MM/HR (ref 0–20)

## 2023-06-19 PROCEDURE — 85652 RBC SED RATE AUTOMATED: CPT | Performed by: INTERNAL MEDICINE

## 2023-06-19 PROCEDURE — 84460 ALANINE AMINO (ALT) (SGPT): CPT | Performed by: INTERNAL MEDICINE

## 2023-06-19 PROCEDURE — 99213 OFFICE O/P EST LOW 20 MIN: CPT | Performed by: INTERNAL MEDICINE

## 2023-06-19 PROCEDURE — 36415 COLL VENOUS BLD VENIPUNCTURE: CPT | Performed by: INTERNAL MEDICINE

## 2023-06-19 PROCEDURE — 80048 BASIC METABOLIC PNL TOTAL CA: CPT | Performed by: INTERNAL MEDICINE

## 2023-06-19 ASSESSMENT — PAIN SCALES - GENERAL: PAINLEVEL: NO PAIN (0)

## 2023-06-19 NOTE — PROGRESS NOTES
"  Assessment & Plan   Problem List Items Addressed This Visit        Endocrine    Type 2 diabetes mellitus with stage 2 chronic kidney disease, without long-term current use of insulin (H)       Other    Lymphedema - Primary    Relevant Orders    ESR: Erythrocyte sedimentation rate (Completed)    Basic metabolic panel  (Ca, Cl, CO2, Creat, Gluc, K, Na, BUN)   Other Visit Diagnoses     Hyperlipidemia LDL goal <100        Ecchymosis of forearm          Reviewed echocardiogram ejection fraction 60% with mild elevation of right ventricular pressures pulmonary pressures of 30 mmHg.  Advised patient to exercise more and keep the legs muscles moving \"to help pump blood to the heart\" and help with lymphedema.  We will check some basic labs, his ESR was slight elevated last time ,we will recheck, we will check a baseline potassium ,kidney again patient has \"upped\" dose of Lasix ,is on twice a day 40 mg, take with potassium twice a day.  We will repeat again kidney function in 4 weeks.  Keep active as possible, follow-up with vascular medicine    We will update MTM.           Anne Hetah MD  [facility] HERNAN Zamorano is a 83 year old, presenting for the following health issues:  Leg Swelling (Patient here with leg swelling.)         View : No data to display.              History of Present Illness       Reason for visit:  Weight gain and and increased edema in legs.  Symptom onset:  More than a month  Symptoms include:  Edema in legs.  was using wraps.  now using compression stockings.  Symptom intensity:  Moderate  Symptom progression:  Improving  Had these symptoms before:  Yes  Has tried/received treatment for these symptoms:  Yes  Previous treatment was successful:  Yes  Prior treatment description:  Compression stockings  What makes it worse:  No  What makes it better:  No    He eats 2-3 servings of fruits and vegetables daily.He consumes 3 sweetened beverage(s) daily.He exercises with " enough effort to increase his heart rate 30 to 60 minutes per day.  He exercises with enough effort to increase his heart rate 7 days per week.   He is taking medications regularly.       Still see home therapy twice a week, with PT OT.  Compression stockings on.  Feels the swelling is a bit better.  Was advised increase Lasix to twice daily dose.    Pressure hemodynamics are stable, breathing is affected by the air quality.   using the albuterol, is feeling better now.  No chest tightness.  leg wounds have healed  Has a lot of bruising on the extensor surfaces.  On aspirin and Xarelto daily.  No blood in the stools.    No black stools.  No dizziness or lightheadedness.  No orthostatic dizziness.    Seen by vascular medicine who advised the edema therapist.  Suggested to take lymphatic formula 1 capsule twice a day for 2 months then once daily information sheet was given to you.    Was transitioned to Xarelto from warfarin by vascular medicine.  He is on iron supplements, every other day with vitamin C and orange juice.    Swelling did not markedly improve, only marginally with increase of the dose of Lasix to twice daily.  Also has increased potassium supplements twice a day.  He cannot clean very well the bowel movements but no diarrhea.  He has been taking Metamucil once a day.  He walks the dog 15 minutes once a day.  He has not been doing the stationary bike    Review of Systems   Constitutional, HEENT, cardiovascular, pulmonary, gi and gu systems are negative, except as otherwise noted.      Objective    /76 (BP Location: Right arm, Patient Position: Sitting, Cuff Size: Adult Large)   Pulse 69   Temp 97.2  F (36.2  C) (Temporal)   Resp 18   Wt 123.8 kg (273 lb)   SpO2 94%   BMI 37.03 kg/m    Body mass index is 37.03 kg/m .  Physical Exam   GENERAL: healthy, alert and no distress  EYES: Eyes grossly normal to inspection, PERRL and conjunctivae and sclerae normal  HENT: ear canals and TM's normal,  nose and mouth without ulcers or lesions  NECK: no adenopathy, no asymmetry, masses, or scars and thyroid normal to palpation  RESP: lungs clear to auscultation - no rales, rhonchi or wheezes  CV: regular rate and rhythm, normal S1 S2, no S3 or S4, no murmur, click or rub, +2 pitting peripheral edema, wears compression stocking,  and peripheral pulses strong  ABDOMEN: soft, nontender, obese, no masses and bowel sounds normal  MS: no gross musculoskeletal defects noted,  SKIN: no suspicious lesions or rashes, extensive ecchymosis on extensor surface of both upper extremities.  has BCC shaved from left face, has band aids on.  NEURO: Normal strength and tone, mentation intact and speech normal  PSYCH: mentation appears normal, affect normal/bright    Ancillary Procedure on 05/23/2023   Component Date Value Ref Range Status     Date Time Interrogation Session 05/23/2023 20230523030100   Final     Implantable Pulse Generator Manufa* 05/23/2023 [employer]   Final     Implantable Pulse Generator Model 05/23/2023 K063 ADVANTIO   Final     Implantable Pulse Generator Serial* 05/23/2023 [id number]   Final     Type Interrogation Session 05/23/2023 Remote   Final     Clinic Name 05/23/2023 [facility]   Final     Implantable Pulse Generator Type 05/23/2023 Pacemaker   Final     Implantable Pulse Generator Implan* 05/23/2023 [id number]   Final     Implantable Lead  05/23/2023 Medtronic   Final     Implantable Lead Model 05/23/2023 4076 CapsureFix Novus   Final     Implantable Lead Serial Number 05/23/2023 [id number]   Final     Implantable Lead Implant Date 05/23/2023 [id number]   Final     Implantable Lead Polarity Type 05/23/2023 Bipolar Lead   Final     Implantable Lead Location Detail 1 05/23/2023 UNKNOWN   Final     Implantable Lead Location 05/23/2023 Right Atrium   Final     Implantable Lead  05/23/2023 Medtronic   Final     Implantable Lead Model 05/23/2023 4076 CapsureFix Novus   Final      Implantable Lead Serial Number 05/23/2023 [id number]   Final     Implantable Lead Implant Date 05/23/2023 [id number]   Final     Implantable Lead Polarity Type 05/23/2023 Bipolar Lead   Final     Implantable Lead Location Detail 1 05/23/2023 UNKNOWN   Final     Implantable Lead Location 05/23/2023 Right Ventricle   Final     Philip Setting Mode (NBG Code) 05/23/2023 DDDR   Final     Philip Setting Lower Rate Limit 05/23/2023 60  [beats]/min Final     Philip Setting Maximum Tracking Rate 05/23/2023 130  [beats]/min Final     Philip Setting NAGI Delay Low 05/23/2023 250  ms Final     Philip Setting PAV Delay Low 05/23/2023 250  ms Final     Philip Setting PAV Delay High 05/23/2023 220  ms Final     Philip Setting NAGI Delay High 05/23/2023 220  ms Final     Philip Setting AT Mode Switch Rate 05/23/2023 150  [beats]/min Final     Philip Setting AT Mode Switch Mode 05/23/2023 VDIR   Final     Lead Channel Setting Sensing Polar* 05/23/2023 Bipolar   Final     Lead Channel Setting Sensing Sensi* 05/23/2023 0.5  mV Final     Lead Channel Setting Sensing Adapt* 05/23/2023 Fixed   Final     Lead Channel Setting Sensing Polar* 05/23/2023 Bipolar   Final     Lead Channel Setting Sensing Sensi* 05/23/2023 1.0  mV Final     Lead Channel Setting Sensing Adapt* 05/23/2023 Fixed   Final     Lead Channel Setting Pacing Polari* 05/23/2023 Bipolar   Final     Lead Channel Setting Pacing Pulse * 05/23/2023 0.4  ms Final     Lead Channel Setting Pacing Amplit* 05/23/2023 2.0  V Final     Lead Channel Setting Pacing Polari* 05/23/2023 Bipolar   Final     Lead Channel Setting Pacing Pulse * 05/23/2023 0.4  ms Final     Lead Channel Setting Pacing Amplit* 05/23/2023 3.2  V Final     Lead Channel Setting Pacing Captur* 05/23/2023 Fixed Pacing   Final     Zone Setting Type Category 05/23/2023 VT   Final     Zone Setting Vendor Type Category 05/23/2023 VT   Final     Zone Setting Detection Interval 05/23/2023 375  ms Final     Lead Channel Impedance Value  05/23/2023 459  ohm Final     Lead Channel Pacing Threshold Ampl* 05/23/2023 0.5  V Final     Lead Channel Pacing Threshold Puls* 05/23/2023 0.4  ms Final     Lead Channel Impedance Value 05/23/2023 393  ohm Final     Battery Date Time of Measurements 05/23/2023 20230523030100   Final     Battery Status 05/23/2023 Beginning of Service   Final     Battery Remaining Longevity 05/23/2023 30  mo Final     Battery Remaining Percentage 05/23/2023 47  % Final     Philip Statistic Date Time Start 05/23/2023 20221116000000   Final     Philip Statistic Date Time End 05/23/2023 20230523000000   Final     Philip Statistic RA Percent Paced 05/23/2023 6  % Final     Philip Statistic RV Percent Paced 05/23/2023 0  % Final     Atrial Tachy Statistic Date Time S* 05/23/2023 20221118000000   Final     Atrial Tachy Statistic Date Time E* 05/23/2023 20230522000000   Final     Atrial Tachy Statistic AT/AF Burde* 05/23/2023 1  % Final     Episode Statistic Recent Count 05/23/2023 15   Final     Episode Statistic Type Category 05/23/2023 AT/AF   Final     Episode Statistic Vendor Type Sharlene* 05/23/2023 AF   Final     Episode Statistic Recent Count 05/23/2023 0   Final     Episode Statistic Type Category 05/23/2023 SVT   Final     Episode Statistic Vendor Type Sharlene* 05/23/2023 SVT   Final     Episode Statistic Recent Count 05/23/2023 20   Final     Episode Statistic Type Category 05/23/2023 VT   Final     Episode Statistic Vendor Type Sharlene* 05/23/2023 NSVT   Final     Episode Statistic Recent Count 05/23/2023 0   Final     Episode Statistic Type Category 05/23/2023 VT   Final     Episode Statistic Vendor Type Sharlene* 05/23/2023 VT   Final     Episode Statistic Recent Date Time* 05/23/2023 [id number]   Final     Episode Statistic Recent Date Time* 05/23/2023 20230523000000   Final     Episode Statistic Recent Date Time* 05/23/2023 [id number]   Final     Episode Statistic Recent Date Time* 05/23/2023 20230523000000   Final     Episode  Statistic Recent Date Time* 05/23/2023 20221116000000   Final     Episode Statistic Recent Date Time* 05/23/2023 20230523000000   Final     Episode Statistic Recent Date Time* 05/23/2023 20221116000000   Final     Episode Statistic Recent Date Time* 05/23/2023 20230523000000   Final     Episode Identifier 05/23/2023 [email or web address]   Final     Episode Type Category 05/23/2023 Periodic EGM   Final     Episode Date Time 05/23/2023 20230523015700   Final     Episode Identifier 05/23/2023 [email or web address]   Final     Episode Type Category 05/23/2023 AT/AF   Final     Episode Date Time 05/23/2023 [id number]   Final     Episode Duration 05/23/2023 9  s Final     Episode Identifier 05/23/2023 [email or web address]   Final     Episode Type Category 05/23/2023 AT/AF   Final     Episode Date Time 05/23/2023 [id number]   Final     Episode Duration 05/23/2023 5  s Final     Episode Identifier 05/23/2023 V-532   Final     Episode Type Category 05/23/2023 VT   Final     Episode Date Time 05/23/2023 [id number]   Final     Episode Duration 05/23/2023 6  s Final     Episode Identifier 05/23/2023 [email or web address]   Final     Episode Type Category 05/23/2023 AT/AF   Final     Episode Date Time 05/23/2023 [id number]   Final     Episode Duration 05/23/2023 7  s Final     Episode Identifier 05/23/2023 [id number]   Final     Episode Type Category 05/23/2023 AT/AF   Final     Episode Date Time 05/23/2023 [id number]   Final     Episode Duration 05/23/2023 7  s Final     Episode Identifier 05/23/2023 [email or web address]   Final     Episode Type Category 05/23/2023 AT/AF   Final     Episode Date Time 05/23/2023 [id number]   Final     Episode Duration 05/23/2023 6  s Final     Episode Identifier 05/23/2023 V-[id number]   Final     Episode Type Category 05/23/2023 VT   Final     Episode Date Time 05/23/2023 [id number]   Final     Episode Duration 05/23/2023 6  s Final     Episode Identifier 05/23/2023 [email or web address]   Final     Episode Type Category 05/23/2023 Other   Final     Episode  Date Time 05/23/2023 20230221085000   Final     Episode Identifier 05/23/2023 [email or web address]   Final     Episode Type Category 05/23/2023 Other   Final     Episode Date Time 05/23/2023 [id number]   Final

## 2023-06-20 LAB
ALT SERPL W P-5'-P-CCNC: 17 U/L (ref 0–70)
ANION GAP SERPL CALCULATED.3IONS-SCNC: 10 MMOL/L (ref 7–15)
BUN SERPL-MCNC: 24.3 MG/DL (ref 8–23)
CALCIUM SERPL-MCNC: 10.2 MG/DL (ref 8.8–10.2)
CHLORIDE SERPL-SCNC: 97 MMOL/L (ref 98–107)
CREAT SERPL-MCNC: 1.05 MG/DL (ref 0.67–1.17)
DEPRECATED HCO3 PLAS-SCNC: 31 MMOL/L (ref 22–29)
GFR SERPL CREATININE-BSD FRML MDRD: 70 ML/MIN/1.73M2
GLUCOSE SERPL-MCNC: 101 MG/DL (ref 70–99)
POTASSIUM SERPL-SCNC: 3.8 MMOL/L (ref 3.4–5.3)
SODIUM SERPL-SCNC: 138 MMOL/L (ref 136–145)

## 2023-06-20 NOTE — RESULT ENCOUNTER NOTE
Real Zamorano reviewed your labs potassium is normal 3.8, sodium is normal.  CO2 31 which means you need to drink more fluids, kidney function stable reassuring.  Please repeat labs chemistry in 4 weeks.  Any further questions please let me know.  Dr Heath

## 2023-06-21 ENCOUNTER — TELEPHONE (OUTPATIENT)
Dept: FAMILY MEDICINE | Facility: CLINIC | Age: 83
End: 2023-06-21
Payer: MEDICARE

## 2023-06-21 NOTE — TELEPHONE ENCOUNTER
Received a call back from the patient. Relayed message from the provider. Assisted in getting the patient scheduled for a lab appointment.     Appointments in Next Year       Jul 21, 2023  9:15 AM  LAB with CS LAB  [facility] ([facility] ) [phone]     Patient expressed understanding and had no additional questions at this time.     Kristal Gamez RN

## 2023-06-21 NOTE — TELEPHONE ENCOUNTER
Patient Contact    Attempt # 1    Was call answered?  No.  Left message on voicemail with information to call triage back.    On callback - please review PCP's result note with patient     Peace Keith RN  [facility]

## 2023-06-21 NOTE — TELEPHONE ENCOUNTER
----- Message from Anne Heath MD sent at 6/20/2023  4:48 PM CDT -----  Real Zamorano reviewed your labs potassium is normal 3.8, sodium is normal.  CO2 31 which means you need to drink more fluids, kidney function stable reassuring.  Please repeat labs chemistry in 4 weeks.  Any further questions please let me know.  Dr Heath

## 2023-06-22 ENCOUNTER — TELEPHONE (OUTPATIENT)
Dept: FAMILY MEDICINE | Facility: CLINIC | Age: 83
End: 2023-06-22
Payer: MEDICARE

## 2023-06-22 NOTE — TELEPHONE ENCOUNTER
Home Care is calling regarding an established patient with [facility].       Requesting orders from: Anne Heath  Provider is following patient: Yes  Is this a 60-day recertification request?  No    Orders Requested    Skilled Nursing  Request for continuation of care with decrease in frequency to one time a week for four weeks.   Wounds healed, still has edema.    Verbal orders given per protocol.    Gabriela Wilder RN

## 2023-06-26 DIAGNOSIS — I50.32 CHRONIC DIASTOLIC HEART FAILURE (H): ICD-10-CM

## 2023-06-26 DIAGNOSIS — E87.6 HYPOKALEMIA: ICD-10-CM

## 2023-06-26 RX ORDER — POTASSIUM CHLORIDE 750 MG/1
10 TABLET, EXTENDED RELEASE ORAL 2 TIMES DAILY
Qty: 180 TABLET | Refills: 1 | Status: SHIPPED | OUTPATIENT
Start: 2023-06-26 | End: 2024-02-22

## 2023-06-26 RX ORDER — FUROSEMIDE 40 MG
40 TABLET ORAL 2 TIMES DAILY
Qty: 180 TABLET | Refills: 1 | Status: SHIPPED | OUTPATIENT
Start: 2023-06-26 | End: 2024-01-24

## 2023-06-29 ENCOUNTER — LAB (OUTPATIENT)
Dept: LAB | Facility: CLINIC | Age: 83
End: 2023-06-29
Payer: MEDICARE

## 2023-06-29 DIAGNOSIS — I10 PRIMARY HYPERTENSION: Primary | ICD-10-CM

## 2023-06-29 DIAGNOSIS — E78.5 HYPERLIPIDEMIA LDL GOAL <100: ICD-10-CM

## 2023-06-29 LAB
CHOLEST SERPL-MCNC: 130 MG/DL
HDLC SERPL-MCNC: 48 MG/DL
LDLC SERPL CALC-MCNC: 67 MG/DL
NONHDLC SERPL-MCNC: 82 MG/DL
TRIGL SERPL-MCNC: 74 MG/DL

## 2023-06-29 PROCEDURE — 80061 LIPID PANEL: CPT | Performed by: INTERNAL MEDICINE

## 2023-06-29 PROCEDURE — 36415 COLL VENOUS BLD VENIPUNCTURE: CPT | Performed by: INTERNAL MEDICINE

## 2023-06-29 RX ORDER — LISINOPRIL 40 MG/1
40 TABLET ORAL DAILY
Qty: 90 TABLET | Refills: 3 | Status: SHIPPED | OUTPATIENT
Start: 2023-06-29 | End: 2023-12-18

## 2023-07-05 ENCOUNTER — OFFICE VISIT (OUTPATIENT)
Dept: OTHER | Facility: CLINIC | Age: 83
End: 2023-07-05
Attending: INTERNAL MEDICINE
Payer: MEDICARE

## 2023-07-05 VITALS
BODY MASS INDEX: 36.03 KG/M2 | HEART RATE: 6 BPM | WEIGHT: 266 LBS | DIASTOLIC BLOOD PRESSURE: 72 MMHG | HEIGHT: 72 IN | SYSTOLIC BLOOD PRESSURE: 125 MMHG | OXYGEN SATURATION: 93 %

## 2023-07-05 DIAGNOSIS — G47.33 OSA (OBSTRUCTIVE SLEEP APNEA): ICD-10-CM

## 2023-07-05 DIAGNOSIS — Z79.01 LONG TERM CURRENT USE OF ANTICOAGULANTS WITH INR GOAL OF 2.0-3.0: ICD-10-CM

## 2023-07-05 DIAGNOSIS — I89.0 LYMPHEDEMA: ICD-10-CM

## 2023-07-05 DIAGNOSIS — I26.99 PE (PULMONARY THROMBOEMBOLISM) (H): ICD-10-CM

## 2023-07-05 DIAGNOSIS — I82.4Z9 DEEP VEIN THROMBOSIS (DVT) OF DISTAL VEIN OF LOWER EXTREMITY, UNSPECIFIED CHRONICITY, UNSPECIFIED LATERALITY (H): ICD-10-CM

## 2023-07-05 DIAGNOSIS — I50.32 CHRONIC DIASTOLIC HEART FAILURE (H): ICD-10-CM

## 2023-07-05 DIAGNOSIS — I10 BENIGN ESSENTIAL HYPERTENSION: ICD-10-CM

## 2023-07-05 DIAGNOSIS — S81.802S OPEN WOUND OF LEFT LOWER EXTREMITY, SEQUELA: ICD-10-CM

## 2023-07-05 DIAGNOSIS — I48.0 PAROXYSMAL A-FIB (H): ICD-10-CM

## 2023-07-05 DIAGNOSIS — I83.893 VARICOSE VEINS OF BILATERAL LOWER EXTREMITIES WITH OTHER COMPLICATIONS: ICD-10-CM

## 2023-07-05 PROCEDURE — G0463 HOSPITAL OUTPT CLINIC VISIT: HCPCS

## 2023-07-05 PROCEDURE — 99214 OFFICE O/P EST MOD 30 MIN: CPT | Performed by: PHYSICIAN ASSISTANT

## 2023-07-05 NOTE — PATIENT INSTRUCTIONS
Go and see Lymphedema Therapy. A referral was placed. They should be calling you to schedule. If you don't hear from them in a week, please call us.     2. Continue to wear compression socks for now until you see them. Consider getting velcro compression wraps with foot attachment.     3. Elevate legs when you can.     4.  Follow-up with us in 3 months to reassess how your lymphedema is doing.     5. Continue taking Lymphatic Formula 1000 supplements.     6. Call us with any questions or concerns ([phone]).

## 2023-07-05 NOTE — PROGRESS NOTES
Patient is here to discuss follow up    /72 (BP Location: Right arm, Patient Position: Chair, Cuff Size: Adult Regular)   Pulse (!) 6   Ht 6' (1.829 m)   Wt 266 lb (120.7 kg)   SpO2 93%   BMI 36.08 kg/m      Questions patient would like addressed today are: N/A.    Refills are needed: No    Has homecare services and agency name:  Varsha GRANGER

## 2023-07-05 NOTE — PROGRESS NOTES
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 Palencia outside the system now dealing with bilateral lower extremity lymphedema. When first seen by Dr. Pardo in 5/2023, he also had an ulcer on the left lower leg area.     He had home health doing 2 layer compression wraps to his lower legs. As such, his appointments at Lymphedema Therapy were cancelled. He was not seen at wound clinic, but his left leg wound did completely heal. He is now being maintained in knee-high compression socks, but continues to have a fair amount of lymphedema (although improved from 2 months ago). His edema is improved with leg elevation. He is on Lasix 40 mg BID.  He is able to get around better now and frequently takes his 3 year old puppy out for walks. Recent echo with EF 67%.      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 Started in 2012     Hypothyroidism      Lymphedema      Mumps      Obesity, Class III, BMI 40-49.9 (morbid obesity) (H)      RIN (obstructive sleep apnea)     has refused CPAP     Pacemaker      Paroxysmal A-fib (H)      PE (pulmonary thromboembolism) (H)        PAST SURGICAL HISTORY:  Past Surgical History:   Procedure Laterality Date     cardiac stenting       CV RIGHT HEART CATH MEASUREMENTS RECORDED N/A 11/5/2019    Procedure: Right Heart Cath;  Surgeon: Roberot Hernandez MD;  Location:  HEART CARDIAC CATH LAB     ENT SURGERY      tonsillectomy     IMPLANT PACEMAKER       INJECT NERVE BLOCK SUPRASCAPULAR Bilateral 8/26/2021    Procedure: BLOCK, NERVE, SUPRASCAPULAR, Bilateral, under ultrasound guidance;  Surgeon: Swetha Allen MD;  Location: [facility] OR     INJECT NERVE BLOCK SUPRASCAPULAR Bilateral 9/23/2021    Procedure: bilateral suprascapular nerve block;  Surgeon: Swetha Allen MD;  Location: [facility] OR     INJECT NERVE BLOCK SUPRASCAPULAR Left 9/15/2022    Procedure: BLOCK, NERVE, SUPRASCAPULAR- left;  Surgeon: Swetha Allen MD;  Location: [facility] OR     INJECT NERVE BLOCK SUPRASCAPULAR Left 10/20/2022    Procedure: BLOCK, NERVE, SUPRASCAPULAR- left;  Surgeon: Swetha Allen MD;  Location: [facility] OR     INJECT NERVE BLOCK SUPRASCAPULAR Left 1/5/2023    Procedure: BLOCK, NERVE, SUPRASCAPULAR (left);  Surgeon: Swetha Allen MD;  Location: [facility] OR     NERVE BLOCK PERIPHERAL Bilateral 3/10/2022    Procedure: bilateral suprascapular nerve block;  Surgeon: Swetha Allen MD;  Location: [facility] OR     ORTHOPEDIC SURGERY Right 2012    knee replacement     REVERSE ARTHROPLASTY SHOULDER Left 2/21/2023    Procedure: ARTHROPLASTY, SHOULDER, TOTAL, REVERSE LEFT;  Surgeon: Katelyn Vasquez MD;  Location: [facility]          CURRENT MEDICATIONS  acetaminophen (TYLENOL) 500 MG tablet, Take 1,000 mg by mouth 3 times daily  Acetylcarnitine HCl (ACETYL L-CARNITINE PO), Take 1 capsule by mouth daily  albuterol (PROAIR HFA/PROVENTIL HFA/VENTOLIN HFA) 108 (90 Base) MCG/ACT inhaler, Inhale 2 puffs into the lungs 4 times daily as needed for shortness of breath or wheezing  aspirin (ASA) 81 MG chewable tablet, Take 1 tablet by mouth daily  atorvastatin (LIPITOR) 80 MG tablet, Take 1 tablet (80 mg) by mouth every evening  diltiazem ER COATED BEADS (CARDIZEM CD/CARTIA XT) 300 MG 24 hr capsule, Take 1 capsule (300 mg) by mouth daily  ferrous sulfate (SLO-FE) 142 (45 Fe) MG CR tablet, Take 1 tablet (142 mg) by mouth every other day  Fluticasone-Umeclidin-Vilanterol (TRELEGY ELLIPTA) 200-62.5-25 MCG/INH oral inhaler, Inhale 1 puff into the lungs daily  furosemide (LASIX) 40 MG tablet, Take 1 tablet (40 mg) by mouth 2 times daily  isosorbide mononitrate (IMDUR) 30 MG 24 hr tablet, Take 1 tablet (30 mg) by mouth daily  levothyroxine (SYNTHROID/LEVOTHROID) 175 MCG tablet, TAKE 1 TABLET(175 MCG) BY MOUTH DAILY  lisinopril (ZESTRIL) 40 MG tablet, Take 1 tablet (40 mg) by mouth daily  metolazone (ZAROXOLYN) 2.5 MG tablet, TAKE 1 TABLET(2.5 MG) BY MOUTH TWICE DAILY  Multiple vitamin TABS, Take 1 tablet by mouth daily  polyethylene glycol (MIRALAX) 17 g packet, Take 17 g by mouth daily  potassium chloride ER (KLOR-CON M) 10 MEQ CR tablet, Take 1 tablet (10 mEq) by mouth 2 times daily  rivaroxaban ANTICOAGULANT (XARELTO) 20 MG TABS tablet, Take 1 tablet (20 mg) by mouth daily (with dinner)  spironolactone (ALDACTONE) 25 MG tablet, TAKE 1 TABLET(25 MG) BY MOUTH EVERY OTHER DAY  tamsulosin (FLOMAX) 0.4 MG capsule, TAKE 1 CAPSULE(0.4 MG) BY MOUTH DAILY  TAURINE PO, Take 1 capsule by mouth daily  venlafaxine (EFFEXOR) 75 MG tablet, TAKE 1 TABLET(75 MG) BY MOUTH TWICE DAILY    No current facility-administered medications on file prior to  visit.      ALLERGIES     Allergies   Allergen Reactions     No Known Allergies        FAMILY HISTORY  Family History   Problem Relation Age of Onset     Brain Cancer Mother      Cerebrovascular Disease Father      Hypertension Father      Anesthesia Reaction No family hx of      Thrombosis No family hx of        SOCIAL HISTORY  Social History     Socioeconomic History     Marital status:      Spouse name: Not on file     Number of children: 5     Years of education: Not on file     Highest education level: Not on file   Occupational History     Occupation: retired teacher at [employer] ([employer])   Tobacco Use     Smoking status: Former     Packs/day: 0.50     Years: 25.00     Pack years: 12.50     Types: Cigarettes     Start date:      Quit date:      Years since quittin.5     Smokeless tobacco: Never   Vaping Use     Vaping Use: Never used   Substance and Sexual Activity     Alcohol use: No     Comment: quit in ; recovering     Drug use: No       ROS:   Complete ROS negative other than what is stated in HPI.     EXAM:  /72 (BP Location: Right arm, Patient Position: Chair, Cuff Size: Adult Regular)   Pulse (!) 6   Ht 6' (1.829 m)   Wt 266 lb (120.7 kg)   SpO2 93%   BMI 36.08 kg/m    In general, the patient is a pleasant male in no apparent distress.    HEENT: NC/AT.  PERRLA.  EOMI.  Sclerae white, not injected.    Heart: RRR. Normal S1, S2 splits physiologically. No murmur, rub, click, or gallop.   Lungs: CTA.  No ronchi, wheezes, rales.    Abdomen: Soft, nontender, nondistended.   Extremities: No clubbing, cyanosis. + Bilateral 2+ bilateral lower extremity edema/lymphedema. No wounds. Ulcer on left lower leg is healed. Numerous spider veins throughout both legs.     Labs:  LIPID RESULTS:  Lab Results   Component Value Date    CHOL 130 2023    CHOL 138 2021    HDL 48 2023    HDL 44 2021    LDL 67 2023    LDL 52 2021    TRIG 74 2023    TRIG  209 (H) 02/19/2021       LIVER ENZYME RESULTS:  Lab Results   Component Value Date    AST 19 05/01/2023    AST 14 02/19/2021    ALT 17 06/19/2023    ALT 26 02/19/2021       CBC RESULTS:  Lab Results   Component Value Date    WBC 9.8 05/01/2023    WBC 10.2 06/14/2021    RBC 3.63 (L) 05/01/2023    RBC 3.68 (L) 06/14/2021    HGB 10.5 (L) 05/01/2023    HGB 12.2 (L) 06/14/2021    HCT 33.8 (L) 05/01/2023    HCT 36.7 (L) 06/14/2021    MCV 93 05/01/2023     06/14/2021    MCH 28.9 05/01/2023    MCH 33.2 (H) 06/14/2021    MCHC 31.1 (L) 05/01/2023    MCHC 33.2 06/14/2021    RDW 16.1 (H) 05/01/2023    RDW 15.6 (H) 06/14/2021     05/01/2023     06/14/2021       BMP RESULTS:  Lab Results   Component Value Date     06/19/2023     06/21/2021    POTASSIUM 3.8 06/19/2023    POTASSIUM 3.5 10/18/2022    POTASSIUM 4.1 06/21/2021    CHLORIDE 97 (L) 06/19/2023    CHLORIDE 99 10/13/2022    CHLORIDE 103 06/21/2021    CO2 31 (H) 06/19/2023    CO2 31 10/13/2022    CO2 29 06/21/2021    ANIONGAP 10 06/19/2023    ANIONGAP 8 10/13/2022    ANIONGAP 6 06/21/2021     (H) 06/19/2023     (H) 02/23/2023     (H) 10/13/2022     (H) 06/21/2021    BUN 24.3 (H) 06/19/2023    BUN 43 (H) 10/13/2022    BUN 64 (H) 06/21/2021    CR 1.05 06/19/2023    CR 1.57 (H) 06/21/2021    GFRESTIMATED 70 06/19/2023    GFRESTIMATED 40 (L) 06/21/2021    GFRESTBLACK 47 (L) 06/21/2021    LAWSON 10.2 06/19/2023    LAWSON 9.4 06/21/2021        A1C RESULTS:  Lab Results   Component Value Date    A1C 5.6 04/19/2023    A1C 6.1 (H) 06/21/2021       THYROID RESULTS:  Lab Results   Component Value Date    TSH 5.04 (H) 04/19/2023    TSH 3.07 10/13/2022    TSH 0.71 06/14/2021         Procedures:   None recently.     Assessment and Plan:   1. Lymphedema (Phlebo-Lymphedema)      2. Varicose veins of bilateral lower extremities with other complications s/p multiple ablations on left leg and one ablation on right leg by Dr. Palencia  (outside system)      3. Deep vein thrombosis (DVT) RLE and PE after knee replacement 2012      4. Open wound of left lower extremity, resolved    5. Long term current use of anticoagulants (Xarelto)     6. Chronic diastolic heart failure     7. RIN (obstructive sleep apnea)     8. Paroxysmal A-fib      9. Benign essential hypertension     -Lymphedema improved some with 2 layer wraps by home care RN and left leg ulceration healed.   -Maintained in knee-high compression socks with ongoing 2+ edema/lymphedema  -He underwent multiple ablations in the legs before -> unclear anymore ablations will be helpful in this patient.    Recommendations:   -Given ongoing lymphedema and now that wound has healed and he is more mobile, discussed with him going to outpatient Lymphedema Therapy for additional wrapping, MLD, and possibly pump therapy in the future. He would like to do this.   -Continue to wear compression socks for now. Prescription given for velcro compression wraps with foot attachment to help contain the edema better than compression socks. He was going to look into them.   -Elevate legs as able.   -Continue Lymphatic Formula 1000 supplements.   -Continue to exercise /walk his puppy.   -Continue Xarelto for history of DVT/PE and atrial fibrillation.      [facility] to 3 months to reassess how Lymphedema Therapy is going.         Phyllis Orr PA-C      30 minutes spent on the date of the encounter doing chart review, history and exam, documentation, and further activities as noted above.

## 2023-07-14 DIAGNOSIS — E03.9 HYPOTHYROIDISM, UNSPECIFIED TYPE: ICD-10-CM

## 2023-07-14 DIAGNOSIS — I50.32 CHRONIC DIASTOLIC HEART FAILURE (H): ICD-10-CM

## 2023-07-14 RX ORDER — METOLAZONE 2.5 MG/1
TABLET ORAL
Qty: 180 TABLET | Refills: 2 | Status: SHIPPED | OUTPATIENT
Start: 2023-07-14 | End: 2024-04-06

## 2023-07-14 RX ORDER — TAMSULOSIN HYDROCHLORIDE 0.4 MG/1
CAPSULE ORAL
Qty: 90 CAPSULE | Refills: 2 | Status: SHIPPED | OUTPATIENT
Start: 2023-07-14 | End: 2024-05-07

## 2023-07-19 ENCOUNTER — THERAPY VISIT (OUTPATIENT)
Dept: OCCUPATIONAL THERAPY | Facility: CLINIC | Age: 83
End: 2023-07-19
Attending: PHYSICIAN ASSISTANT
Payer: MEDICARE

## 2023-07-19 DIAGNOSIS — I89.0 LYMPHEDEMA: ICD-10-CM

## 2023-07-19 PROCEDURE — 97165 OT EVAL LOW COMPLEX 30 MIN: CPT | Mod: GO | Performed by: OCCUPATIONAL THERAPIST

## 2023-07-19 PROCEDURE — 97140 MANUAL THERAPY 1/> REGIONS: CPT | Mod: GO | Performed by: OCCUPATIONAL THERAPIST

## 2023-07-19 NOTE — PROGRESS NOTES
OCCUPATIONAL THERAPY EVALUATION  Type of Visit: Evaluation    See electronic medical record for Abuse and Falls Screening details.    Subjective      Presenting condition or subjective complaint: need for continued treatment  Date of onset: 07/05/23 (chronic for years)    Relevant medical history: Arthritis; Cancer; Change in skin color; Cold or hot arm or leg; COPD   Dates & types of surgery: sal treatment June 2023    Prior diagnostic imaging/testing results: Other vascular treatment - surgical stripping veins   Prior therapy history for the same diagnosis, illness or injury: Yes wrapping - home health last three months    Prior Level of Function  Transfers: Independent  Ambulation: Assistive equipment  ADL: Independent, Assistive equipment  IADL: Driving, Finances, Housekeeping, Laundry, Meal preparation, Medication management    Living Environment  Social support: Alone   Type of home: Apartment/condo; 1 level   Stairs to enter the home: No       Ramp: Yes   Stairs inside the home: No       Help at home: None  Equipment owned: Straight Cane; Bedrail; Raised toilet seat     Employment: No    Hobbies/Interests: writing - zoom meetings    Patient goals for therapy: walk better    Pain assessment: Pt reports no pain     Objective       EDEMA EVALUATION  Additional history:  Body part affected by edema: legs and feet  If cancer related, treatment: sal for basal carcenoma  If not cancer related, problems with veins or cause of swelling: yes - not sure  Distance able to walk: 2 blocks  Time able to stand: 20 minutes  Sensation problems in hands/feet: Yes numbness in hands and feet  Edema etiology: Chronic Venous Insufficiency, DVT, cardiac background; reduced walking/activity; VV's    FUNCTIONAL SCALES  Lower Extremity Functional Scale (score out of 80). A lower score indicates greater impairment:    Shoulder Pain and Disability Index (score out of 100).  A higher score indicates greater impairment:      Cognitive  Status Examination  Orientation: Oriented to person, place and time   Level of Consciousness: Alert  Follows Commands and Answers Questions: 75% of the time  Personal Safety and Judgement: Intact  Memory: NT'd    EDEMA  Skin Condition: Hemosiderin deposits, Non-pitting, Pitting  Scar: No  Capillary Refill: Symmetrical  Radial Pulse: NT'd  Dorsal Pedal Pulse: did not palpate-no signs ischemia  Stemmer Sign: -  Ulceration: No    GIRTH MEASUREMENTS: Refer to separate girth measurement flowsheet.     VOLUME UE  Right UE (mL)    Left UE (mL)    UE Volume Comparison LLE volume greater than RLE volume   % Difference    VOLUME LE  Right LE (mL)    Left LE (mL)    LE Volume Comparison BUE vol approx same   % Difference    Head/Neck Volume     Trunk Volume    Genital Volume       RANGE OF MOTION: LE ROM WFL  UE ROM WFL  STRENGTH: UE Strength WFL, LE Strength WFL  POSTURE: WFL  PALPATION:   ACTIVITIES OF DAILY LIVING: Ind-Mod I  BED MOBILITY: WFL  TRANSFERS: WFL  GAIT/LOCOMOTION: Mod I-SEC used  BALANCE: WFL  SENSATION: pt reports neuropathy like symptoms occasionally in BLEs  VASCULAR: history venous ablations; VVs  COORDINATION: WFL  MUSCLE TONE: WFL    Assessment & Plan   CLINICAL IMPRESSIONS  Medical Diagnosis: lymphedema    Treatment Diagnosis: lymphedema    Impression/Assessment: Pts BLE lymphedema realted to histry of DVT, VVs, and cardiac background.    Clinical Decision Making (Complexity):  Assessment of Occupational Performance: 1-3 Performance Deficits  Occupational Performance Limitations: functional mobility and infection risk  Clinical Decision Making (Complexity): Low complexity    PLAN OF CARE  Treatment Interventions:  Interventions: Self-Care/Home Management, Therapeutic Activity, Therapeutic Exercise, Manual Therapy    Long Term Goals   OT Goal 1  Goal Identifier: Ed  Goal Description: Pt will report understanding the differences between s/s lymphedema vs s/s cellulitis for safety and I with home management  of BLE lymphedema  Rationale: In order to maximize safety and independence with performance of self-care activities;In order to maximize safety and independence with functional transfers and functional mobility within the home or community  Target Date: 10/13/23  OT Goal 2  Goal Identifier: MLD/HEP  Goal Description: Demonstrate independence in performing prescribed exercises to facilate the lymph system and muscle pumping systems for max reducitons in BLE lymphedema neede dto help improve functional mobility and reduce risk skin infections  Rationale: In order to maximize safety and independence with performance of self-care activities;In order to maximize safety and independence with functional transfers and functional mobility within the home or community  Target Date: 10/13/23      Frequency of Treatment: 1x/wk  Duration of Treatment: 12 weeks with ability to taper as needed     Recommended Referrals to Other Professionals: none at this time  Education Assessment:       Risks and benefits of evaluation/treatment have been explained.   Patient/Family/caregiver agrees with Plan of Care.     Evaluation Time:    OT Sabino Low Complexity Minutes (68105): 20       Signing Clinician: Cl Nobles      [facility]                                                                                   OUTPATIENT OCCUPATIONAL THERAPY      PLAN OF TREATMENT FOR OUTPATIENT REHABILITATION   Patient's Last Name, First Name, Erich Monet YOB: 1940   Provider's Name   [facility]   Medical Record No.  [id number]     Onset Date: 07/05/23 (chronic for years) Start of Care Date: 07/19/23     Medical Diagnosis:  lymphedema      OT Treatment Diagnosis:  lymphedema Plan of Treatment  Frequency/Duration:1x/wk/12 weeks with ability to taper as needed    Certification date from 07/19/23   To 10/13/23        See note for plan of treatment details and  functional goals     Cl Nobles                         I CERTIFY THE NEED FOR THESE SERVICES FURNISHED UNDER        THIS PLAN OF TREATMENT AND WHILE UNDER MY CARE     (Physician attestation of this document indicates review and certification of the therapy plan).                  Referring Provider:  Phyllis Orr      Initial Assessment  See Epic Evaluation- 07/19/23

## 2023-07-22 ENCOUNTER — NURSE TRIAGE (OUTPATIENT)
Dept: NURSING | Facility: CLINIC | Age: 83
End: 2023-07-22
Payer: MEDICARE

## 2023-07-22 NOTE — TELEPHONE ENCOUNTER
Patient had oral surgery on Tuesday morning and had three teeth extracted.  Patient states that he had trouble bleeding.  Patient got bleeding to stop and then opened up again on Thursday, Friday and today.  FNA advised to contact surgeon and patient agrees.        Reason for Disposition   [1] Bleeding from incision AND [2] won't stop after 10 minutes of direct pressure    Additional Information   Negative: [1] Major abdominal surgical incision AND [2] wound gaping open AND [3] visible internal organs   Negative: Sounds like a life-threatening emergency to the triager    Protocols used: Post-Op Incision Symptoms-A-AH

## 2023-07-26 ENCOUNTER — LAB (OUTPATIENT)
Dept: LAB | Facility: CLINIC | Age: 83
End: 2023-07-26
Payer: MEDICARE

## 2023-07-26 DIAGNOSIS — N18.2 CHRONIC RENAL DISEASE, STAGE II: ICD-10-CM

## 2023-07-26 DIAGNOSIS — N18.2 TYPE 2 DIABETES MELLITUS WITH STAGE 2 CHRONIC KIDNEY DISEASE, WITHOUT LONG-TERM CURRENT USE OF INSULIN (H): ICD-10-CM

## 2023-07-26 DIAGNOSIS — E11.22 TYPE 2 DIABETES MELLITUS WITH STAGE 2 CHRONIC KIDNEY DISEASE, WITHOUT LONG-TERM CURRENT USE OF INSULIN (H): ICD-10-CM

## 2023-07-26 LAB
ALBUMIN MFR UR ELPH: <6 MG/DL
ALBUMIN SERPL BCG-MCNC: 4.2 G/DL (ref 3.5–5.2)
ANION GAP SERPL CALCULATED.3IONS-SCNC: 9 MMOL/L (ref 7–15)
BUN SERPL-MCNC: 46.9 MG/DL (ref 8–23)
CALCIUM SERPL-MCNC: 10 MG/DL (ref 8.8–10.2)
CHLORIDE SERPL-SCNC: 97 MMOL/L (ref 98–107)
CREAT SERPL-MCNC: 1.29 MG/DL (ref 0.67–1.17)
CREAT UR-MCNC: 36.5 MG/DL
DEPRECATED HCO3 PLAS-SCNC: 32 MMOL/L (ref 22–29)
GFR SERPL CREATININE-BSD FRML MDRD: 55 ML/MIN/1.73M2
GLUCOSE SERPL-MCNC: 123 MG/DL (ref 70–99)
HBA1C MFR BLD: 5.6 % (ref 0–5.6)
HGB BLD-MCNC: 11.1 G/DL (ref 13.3–17.7)
PHOSPHATE SERPL-MCNC: 3.6 MG/DL (ref 2.5–4.5)
POTASSIUM SERPL-SCNC: 4 MMOL/L (ref 3.4–5.3)
PROT/CREAT 24H UR: NORMAL MG/G{CREAT}
SODIUM SERPL-SCNC: 138 MMOL/L (ref 136–145)

## 2023-07-26 PROCEDURE — 80069 RENAL FUNCTION PANEL: CPT

## 2023-07-26 PROCEDURE — 36415 COLL VENOUS BLD VENIPUNCTURE: CPT

## 2023-07-26 PROCEDURE — 83036 HEMOGLOBIN GLYCOSYLATED A1C: CPT

## 2023-07-26 PROCEDURE — 84156 ASSAY OF PROTEIN URINE: CPT

## 2023-07-26 PROCEDURE — 85018 HEMOGLOBIN: CPT

## 2023-08-04 NOTE — OP NOTE
Patient: Erich Craven Age: 81 year old   MRN: [id number] Attending: Dr. Allen            PAIN MEDICINE CLINIC PROCEDURE NOTE     ATTENDING CLINICIAN:    Swetha Allen MD     ASSISTANT CLINICIAN:  Darci Londono MD, JOHNNA     PREPROCEDURE DIAGNOSES:  1.  Bilateral shoulder pain  2.  Bilateral rotator cuff tear     PROCEDURE(S) PERFORMED:  1.  Bilateral suprascapular nerve blocks  2.  Ultrasound guidance for the above-named procedure(s)        ANESTHESIA:  Local.     BLOOD LOSS:  Minimal.     DRAINS AND SPECIMENS:  None.     COMPLICATIONS:  None.     INDICATIONS:  Erich Craven is a 81 year old male with a history of  chronic shoulder pain secondary to rotator cuff tear.  The patient stated that the patient was in their usual state of health and denied recent anticoagulant use or recent infections.  Therefore, the plan is ti perform above mentioned procedure.      Procedure Details:     The patient was met in the procedure room, where the patient was identified by name, medical record number and date of birth.  All of the patient s last minute questions were answered. Written informed consent was obtained and saved in the electronic medical record, after the risks, benefits, and alternatives were discussed with the patient.       A formal time-out procedure was performed, as per protocol, including patient name, title of procedure, and site of procedure, and all in the room concurred.  Routine monitors were applied.       The patient was placed in the sitting position on the procedure room table.Routine monitors were placed.  Vital signs were stable.     A chlorhexidine prep was completed followed by sterile draping per standard procedure.  Patient's consent was obtained.  The right side Suprascapular area was prepped using Chloraprep and the area was sterilely draped.  The ultrasound probe was draped and the U/S was used to identify and confirm the depth.  Fascial planes were easily visible as well as subcutaneous  [FreeTextEntry1] : bites tissue and the supraspinatus fossae with the scapula as the deep border.  A 21G 100 mm  pajunk needle was used in an in-plane fashion and positioned deep to the hyperechoic suprascapular ligament taking care to avoid the visibly pulsatile suprascapular artery.   ~1ml of the steroid local anesthetic was injected to confirm correct placement of the needle tip prior to injection of the remaining 5 mls of 0.25% bupivacaine 4.5 ml and 20 mg( 0.5 ml) of triamcinolone  into the area, with several aspirations being negative for blood.  A screen shot,was taken of nicely extravasating fluid into the the expected location of the supraspinatus nerve.  The needle was removed, and a band-aid was applied.     Exact same procedure performed on the left side.     Light pressure was held at the puncture site(s) to prevent ecchymosis and oozing.  The patient's skin was cleansed, and hemostasis was confirmed.  Band-aids were applied to the needle injection site(s).       Condition:     The patient remained awake and alert throughout the procedure.  The patient tolerated the procedure well and was monitored for approximately 15 minutes afterward in the post procedure area.  There were no immediate post procedure complications noted.  The patient was then discharged to home as per protocol.       Pre-procedure pain score: 7/10  Post-procedure pain score: 1/10       [de-identified] : KEVIN GOMEZ is a pleasant 60 year M who presents for the followin) Spots on feet - States he was in [place] 2022. Was walking in the forest, wearing protective clothing but states there were 3 leeches that got into the feet and had bites - Subsequently had bleeding and crusting over.  - However, since the trip and bites, has not improved.  - Notes pruritus associated and possible new lesions that popped up around shins, hands and arms   No p/f history of skin cancer. SH: grew up in [place], enjoys traveling

## 2023-08-13 DIAGNOSIS — E03.9 HYPOTHYROIDISM, UNSPECIFIED TYPE: ICD-10-CM

## 2023-08-14 RX ORDER — LEVOTHYROXINE SODIUM 175 UG/1
TABLET ORAL
Qty: 90 TABLET | Refills: 0 | Status: SHIPPED | OUTPATIENT
Start: 2023-08-14 | End: 2023-11-09

## 2023-08-29 ENCOUNTER — ANCILLARY PROCEDURE (OUTPATIENT)
Dept: CARDIOLOGY | Facility: CLINIC | Age: 83
End: 2023-08-29
Attending: INTERNAL MEDICINE
Payer: MEDICARE

## 2023-08-29 DIAGNOSIS — I49.5 SICK SINUS SYNDROME (H): ICD-10-CM

## 2023-08-29 DIAGNOSIS — Z95.0 CARDIAC PACEMAKER IN SITU: ICD-10-CM

## 2023-08-29 PROCEDURE — 93296 REM INTERROG EVL PM/IDS: CPT | Performed by: INTERNAL MEDICINE

## 2023-08-29 PROCEDURE — 93294 REM INTERROG EVL PM/LDLS PM: CPT | Performed by: INTERNAL MEDICINE

## 2023-09-05 LAB
MDC_IDC_EPISODE_DTM: NORMAL
MDC_IDC_EPISODE_DURATION: 4 S
MDC_IDC_EPISODE_DURATION: 5 S
MDC_IDC_EPISODE_DURATION: 5 S
MDC_IDC_EPISODE_DURATION: 6 S
MDC_IDC_EPISODE_DURATION: 6 S
MDC_IDC_EPISODE_DURATION: 7 S
MDC_IDC_EPISODE_DURATION: 7 S
MDC_IDC_EPISODE_DURATION: 8 S
MDC_IDC_EPISODE_ID: NORMAL
MDC_IDC_EPISODE_TYPE: NORMAL
MDC_IDC_LEAD_IMPLANT_DT: NORMAL
MDC_IDC_LEAD_IMPLANT_DT: NORMAL
MDC_IDC_LEAD_LOCATION: NORMAL
MDC_IDC_LEAD_LOCATION: NORMAL
MDC_IDC_LEAD_LOCATION_DETAIL_1: NORMAL
MDC_IDC_LEAD_LOCATION_DETAIL_1: NORMAL
MDC_IDC_LEAD_MFG: NORMAL
MDC_IDC_LEAD_MFG: NORMAL
MDC_IDC_LEAD_MODEL: NORMAL
MDC_IDC_LEAD_MODEL: NORMAL
MDC_IDC_LEAD_POLARITY_TYPE: NORMAL
MDC_IDC_LEAD_POLARITY_TYPE: NORMAL
MDC_IDC_LEAD_SERIAL: NORMAL
MDC_IDC_LEAD_SERIAL: NORMAL
MDC_IDC_MSMT_BATTERY_DTM: NORMAL
MDC_IDC_MSMT_BATTERY_REMAINING_LONGEVITY: 24 MO
MDC_IDC_MSMT_BATTERY_REMAINING_PERCENTAGE: 43 %
MDC_IDC_MSMT_BATTERY_STATUS: NORMAL
MDC_IDC_MSMT_LEADCHNL_RA_IMPEDANCE_VALUE: 431 OHM
MDC_IDC_MSMT_LEADCHNL_RA_PACING_THRESHOLD_AMPLITUDE: 0.5 V
MDC_IDC_MSMT_LEADCHNL_RA_PACING_THRESHOLD_PULSEWIDTH: 0.4 MS
MDC_IDC_MSMT_LEADCHNL_RV_IMPEDANCE_VALUE: 375 OHM
MDC_IDC_PG_IMPLANT_DTM: NORMAL
MDC_IDC_PG_MFG: NORMAL
MDC_IDC_PG_MODEL: NORMAL
MDC_IDC_PG_SERIAL: NORMAL
MDC_IDC_PG_TYPE: NORMAL
MDC_IDC_SESS_CLINIC_NAME: NORMAL
MDC_IDC_SESS_DTM: NORMAL
MDC_IDC_SESS_TYPE: NORMAL
MDC_IDC_SET_BRADY_AT_MODE_SWITCH_MODE: NORMAL
MDC_IDC_SET_BRADY_AT_MODE_SWITCH_RATE: 150 {BEATS}/MIN
MDC_IDC_SET_BRADY_LOWRATE: 60 {BEATS}/MIN
MDC_IDC_SET_BRADY_MAX_TRACKING_RATE: 130 {BEATS}/MIN
MDC_IDC_SET_BRADY_MODE: NORMAL
MDC_IDC_SET_BRADY_PAV_DELAY_HIGH: 220 MS
MDC_IDC_SET_BRADY_PAV_DELAY_LOW: 250 MS
MDC_IDC_SET_BRADY_SAV_DELAY_HIGH: 220 MS
MDC_IDC_SET_BRADY_SAV_DELAY_LOW: 250 MS
MDC_IDC_SET_LEADCHNL_RA_PACING_AMPLITUDE: 2 V
MDC_IDC_SET_LEADCHNL_RA_PACING_POLARITY: NORMAL
MDC_IDC_SET_LEADCHNL_RA_PACING_PULSEWIDTH: 0.4 MS
MDC_IDC_SET_LEADCHNL_RA_SENSING_ADAPTATION_MODE: NORMAL
MDC_IDC_SET_LEADCHNL_RA_SENSING_POLARITY: NORMAL
MDC_IDC_SET_LEADCHNL_RA_SENSING_SENSITIVITY: 0.5 MV
MDC_IDC_SET_LEADCHNL_RV_PACING_AMPLITUDE: 3.2 V
MDC_IDC_SET_LEADCHNL_RV_PACING_CAPTURE_MODE: NORMAL
MDC_IDC_SET_LEADCHNL_RV_PACING_POLARITY: NORMAL
MDC_IDC_SET_LEADCHNL_RV_PACING_PULSEWIDTH: 0.4 MS
MDC_IDC_SET_LEADCHNL_RV_SENSING_ADAPTATION_MODE: NORMAL
MDC_IDC_SET_LEADCHNL_RV_SENSING_POLARITY: NORMAL
MDC_IDC_SET_LEADCHNL_RV_SENSING_SENSITIVITY: 1 MV
MDC_IDC_SET_ZONE_DETECTION_INTERVAL: 375 MS
MDC_IDC_SET_ZONE_TYPE: NORMAL
MDC_IDC_SET_ZONE_VENDOR_TYPE: NORMAL
MDC_IDC_STAT_AT_BURDEN_PERCENT: 1 %
MDC_IDC_STAT_AT_DTM_END: NORMAL
MDC_IDC_STAT_AT_DTM_START: NORMAL
MDC_IDC_STAT_BRADY_DTM_END: NORMAL
MDC_IDC_STAT_BRADY_DTM_START: NORMAL
MDC_IDC_STAT_BRADY_RA_PERCENT_PACED: 7 %
MDC_IDC_STAT_BRADY_RV_PERCENT_PACED: 0 %
MDC_IDC_STAT_EPISODE_RECENT_COUNT: 0
MDC_IDC_STAT_EPISODE_RECENT_COUNT: 0
MDC_IDC_STAT_EPISODE_RECENT_COUNT: 18
MDC_IDC_STAT_EPISODE_RECENT_COUNT: 25
MDC_IDC_STAT_EPISODE_RECENT_COUNT_DTM_END: NORMAL
MDC_IDC_STAT_EPISODE_RECENT_COUNT_DTM_START: NORMAL
MDC_IDC_STAT_EPISODE_TYPE: NORMAL
MDC_IDC_STAT_EPISODE_VENDOR_TYPE: NORMAL

## 2023-09-06 ENCOUNTER — THERAPY VISIT (OUTPATIENT)
Dept: OCCUPATIONAL THERAPY | Facility: CLINIC | Age: 83
End: 2023-09-06
Attending: PHYSICIAN ASSISTANT
Payer: MEDICARE

## 2023-09-06 DIAGNOSIS — I89.0 LYMPHEDEMA: Primary | ICD-10-CM

## 2023-09-06 PROCEDURE — 97140 MANUAL THERAPY 1/> REGIONS: CPT | Mod: GO | Performed by: OCCUPATIONAL THERAPIST

## 2023-09-13 ENCOUNTER — THERAPY VISIT (OUTPATIENT)
Dept: OCCUPATIONAL THERAPY | Facility: CLINIC | Age: 83
End: 2023-09-13
Attending: PHYSICIAN ASSISTANT
Payer: MEDICARE

## 2023-09-13 DIAGNOSIS — I89.0 LYMPHEDEMA: Primary | ICD-10-CM

## 2023-09-13 PROCEDURE — 97140 MANUAL THERAPY 1/> REGIONS: CPT | Mod: GO | Performed by: OCCUPATIONAL THERAPIST

## 2023-10-04 ENCOUNTER — OFFICE VISIT (OUTPATIENT)
Dept: FAMILY MEDICINE | Facility: CLINIC | Age: 83
End: 2023-10-04
Payer: MEDICARE

## 2023-10-04 VITALS
RESPIRATION RATE: 18 BRPM | OXYGEN SATURATION: 96 % | BODY MASS INDEX: 36.03 KG/M2 | DIASTOLIC BLOOD PRESSURE: 62 MMHG | SYSTOLIC BLOOD PRESSURE: 108 MMHG | WEIGHT: 266 LBS | HEART RATE: 60 BPM | HEIGHT: 72 IN

## 2023-10-04 DIAGNOSIS — E11.22 TYPE 2 DIABETES MELLITUS WITH STAGE 2 CHRONIC KIDNEY DISEASE, WITHOUT LONG-TERM CURRENT USE OF INSULIN (H): ICD-10-CM

## 2023-10-04 DIAGNOSIS — E03.9 HYPOTHYROIDISM, UNSPECIFIED TYPE: ICD-10-CM

## 2023-10-04 DIAGNOSIS — N18.2 TYPE 2 DIABETES MELLITUS WITH STAGE 2 CHRONIC KIDNEY DISEASE, WITHOUT LONG-TERM CURRENT USE OF INSULIN (H): ICD-10-CM

## 2023-10-04 DIAGNOSIS — I10 PRIMARY HYPERTENSION: Primary | ICD-10-CM

## 2023-10-04 DIAGNOSIS — E61.1 LOW IRON: ICD-10-CM

## 2023-10-04 LAB
BASO+EOS+MONOS # BLD AUTO: ABNORMAL 10*3/UL
BASO+EOS+MONOS NFR BLD AUTO: ABNORMAL %
BASOPHILS # BLD AUTO: 0 10E3/UL (ref 0–0.2)
BASOPHILS NFR BLD AUTO: 0 %
EOSINOPHIL # BLD AUTO: 0.3 10E3/UL (ref 0–0.7)
EOSINOPHIL NFR BLD AUTO: 3 %
ERYTHROCYTE [DISTWIDTH] IN BLOOD BY AUTOMATED COUNT: 15.4 % (ref 10–15)
HCT VFR BLD AUTO: 41.3 % (ref 40–53)
HGB BLD-MCNC: 12.8 G/DL (ref 13.3–17.7)
IMM GRANULOCYTES # BLD: 0 10E3/UL
IMM GRANULOCYTES NFR BLD: 1 %
LYMPHOCYTES # BLD AUTO: 1.8 10E3/UL (ref 0.8–5.3)
LYMPHOCYTES NFR BLD AUTO: 22 %
MCH RBC QN AUTO: 30.9 PG (ref 26.5–33)
MCHC RBC AUTO-ENTMCNC: 31 G/DL (ref 31.5–36.5)
MCV RBC AUTO: 100 FL (ref 78–100)
MONOCYTES # BLD AUTO: 0.9 10E3/UL (ref 0–1.3)
MONOCYTES NFR BLD AUTO: 11 %
NEUTROPHILS # BLD AUTO: 5.2 10E3/UL (ref 1.6–8.3)
NEUTROPHILS NFR BLD AUTO: 64 %
PLATELET # BLD AUTO: 171 10E3/UL (ref 150–450)
RBC # BLD AUTO: 4.14 10E6/UL (ref 4.4–5.9)
WBC # BLD AUTO: 8.2 10E3/UL (ref 4–11)

## 2023-10-04 PROCEDURE — 84443 ASSAY THYROID STIM HORMONE: CPT | Performed by: PHYSICIAN ASSISTANT

## 2023-10-04 PROCEDURE — 83550 IRON BINDING TEST: CPT | Performed by: PHYSICIAN ASSISTANT

## 2023-10-04 PROCEDURE — 80053 COMPREHEN METABOLIC PANEL: CPT | Performed by: PHYSICIAN ASSISTANT

## 2023-10-04 PROCEDURE — 84439 ASSAY OF FREE THYROXINE: CPT | Performed by: PHYSICIAN ASSISTANT

## 2023-10-04 PROCEDURE — 36415 COLL VENOUS BLD VENIPUNCTURE: CPT | Performed by: PHYSICIAN ASSISTANT

## 2023-10-04 PROCEDURE — 83540 ASSAY OF IRON: CPT | Performed by: PHYSICIAN ASSISTANT

## 2023-10-04 PROCEDURE — 99214 OFFICE O/P EST MOD 30 MIN: CPT | Performed by: PHYSICIAN ASSISTANT

## 2023-10-04 PROCEDURE — 82728 ASSAY OF FERRITIN: CPT | Performed by: PHYSICIAN ASSISTANT

## 2023-10-04 PROCEDURE — 85025 COMPLETE CBC W/AUTO DIFF WBC: CPT | Performed by: PHYSICIAN ASSISTANT

## 2023-10-04 ASSESSMENT — PAIN SCALES - GENERAL: PAINLEVEL: NO PAIN (0)

## 2023-10-04 NOTE — PROGRESS NOTES
"Assessment & Plan     Primary hypertension  Well controlled, labs today.    - TSH  - T4, free  - CBC with platelets and differential  - Comprehensive metabolic panel (BMP + Alb, Alk Phos, ALT, AST, Total. Bili, TP)      Type 2 diabetes mellitus with stage 2 chronic kidney disease, without long-term current use of insulin (H)  Well controlled - A1c two months ago was 5.6%    Hypothyroidism, unspecified type  Due for thyroid labs.   - TSH  - T4, free  - TSH  - T4, free    Low iron  Taking slow FE every other day. Reassess iron studies.   - Iron and iron binding capacity  - Ferritin  - Iron and iron binding capacity  - Ferritin      20 minutes spent by me on the date of the encounter doing chart review, review of test results, interpretation of tests, patient visit, and documentation          No follow-ups on file.    The likelihood of other entities in the differential is insufficient to justify any further testing for them at this time. This was explained to the patient. The patient was advised that persistent or worsening symptoms would require further evaluation. Patient advised to call the office and if unable to reach to go to the emergency room if they develop any new or worsening symptoms. Expressed understanding and agreement with above stated plan.     Erich Duarte PA-C  [facility]    Subjective   Erich Craven is a 83 year old male presenting for the following health issues:  Patient presents with:  Dizziness: Couple weeks ago  LAB REQUEST: \"Check labs to make sure everything is ok before going on a trip for 2 weeks\"      Here for routine follow-up. PCP who he had an appointment with today was unavailable. Overall feeling well, just wishes to check some labs prior to going on a trip to [place]. Did have some off and on dizziness a few weeks ago which has resolved. Lymphedema better controlled with LE braces/wraps.      Review of Systems   Constitutional, HEENT, cardiovascular, " "pulmonary, GI, , musculoskeletal, neuro, skin, endocrine and psych systems are negative, except as otherwise noted.      Objective    /62 (BP Location: Left arm, Patient Position: Sitting, Cuff Size: Adult Large)   Pulse 60   Resp 18   Ht 1.829 m (6')   Wt 120.7 kg (266 lb)   SpO2 96%   BMI 36.08 kg/m    6' 0\"  266 lbs 0 oz    Physical Exam   GENERAL: healthy, alert and no distress  EYES: Eyes grossly normal to inspection, PERRL and conjunctivae and sclerae normal  NECK: no adenopathy, no asymmetry, masses, or scars and thyroid normal to palpation  RESP: lungs clear to auscultation - no rales, rhonchi or wheezes  CV: regular rate and rhythm, normal S1 S2, no S3 or S4, no murmur, click or rub, no peripheral edema and peripheral pulses strong  MS: no gross musculoskeletal defects noted, pedal edema  SKIN: no suspicious lesions or rashes  NEURO: Normal strength and tone, mentation intact and speech normal  PSYCH: mentation appears normal, affect normal/bright    Answers submitted by the patient for this visit:  General Questionnaire (Submitted on 10/4/2023)  Chief Complaint: Chronic problems general questions HPI Form  What is the reason for your visit today? : Medical review  How many servings of fruits and vegetables do you eat daily?: 0-1  On average, how many sweetened beverages do you drink each day (Examples: soda, juice, sweet tea, etc.  Do NOT count diet or artificially sweetened beverages)?: 3  How many minutes a day do you exercise enough to make your heart beat faster?: 10 to 19  How many days a week do you exercise enough to make your heart beat faster?: 4  How many days per week do you miss taking your medication?: 0    "

## 2023-10-05 ENCOUNTER — OFFICE VISIT (OUTPATIENT)
Dept: OTHER | Facility: CLINIC | Age: 83
End: 2023-10-05
Attending: PHYSICIAN ASSISTANT
Payer: MEDICARE

## 2023-10-05 VITALS
WEIGHT: 266.8 LBS | DIASTOLIC BLOOD PRESSURE: 70 MMHG | HEART RATE: 61 BPM | OXYGEN SATURATION: 91 % | BODY MASS INDEX: 36.14 KG/M2 | SYSTOLIC BLOOD PRESSURE: 114 MMHG | HEIGHT: 72 IN

## 2023-10-05 DIAGNOSIS — G47.33 OSA (OBSTRUCTIVE SLEEP APNEA): ICD-10-CM

## 2023-10-05 DIAGNOSIS — I89.0 LYMPHEDEMA: Primary | ICD-10-CM

## 2023-10-05 DIAGNOSIS — I82.4Z9 DEEP VEIN THROMBOSIS (DVT) OF DISTAL VEIN OF LOWER EXTREMITY, UNSPECIFIED CHRONICITY, UNSPECIFIED LATERALITY (H): ICD-10-CM

## 2023-10-05 DIAGNOSIS — I26.99 PE (PULMONARY THROMBOEMBOLISM) (H): ICD-10-CM

## 2023-10-05 DIAGNOSIS — I50.32 CHRONIC DIASTOLIC HEART FAILURE (H): ICD-10-CM

## 2023-10-05 DIAGNOSIS — I83.893 VARICOSE VEINS OF BILATERAL LOWER EXTREMITIES WITH OTHER COMPLICATIONS: ICD-10-CM

## 2023-10-05 DIAGNOSIS — I10 BENIGN ESSENTIAL HYPERTENSION: ICD-10-CM

## 2023-10-05 DIAGNOSIS — I48.0 PAROXYSMAL A-FIB (H): ICD-10-CM

## 2023-10-05 LAB
ALBUMIN SERPL BCG-MCNC: 4.1 G/DL (ref 3.5–5.2)
ALP SERPL-CCNC: 117 U/L (ref 40–129)
ALT SERPL W P-5'-P-CCNC: 14 U/L (ref 0–70)
ANION GAP SERPL CALCULATED.3IONS-SCNC: 11 MMOL/L (ref 7–15)
AST SERPL W P-5'-P-CCNC: 18 U/L (ref 0–45)
BILIRUB SERPL-MCNC: 0.3 MG/DL
BUN SERPL-MCNC: 52.1 MG/DL (ref 8–23)
CALCIUM SERPL-MCNC: 9.8 MG/DL (ref 8.8–10.2)
CHLORIDE SERPL-SCNC: 97 MMOL/L (ref 98–107)
CREAT SERPL-MCNC: 1.5 MG/DL (ref 0.67–1.17)
DEPRECATED HCO3 PLAS-SCNC: 29 MMOL/L (ref 22–29)
EGFRCR SERPLBLD CKD-EPI 2021: 46 ML/MIN/1.73M2
FERRITIN SERPL-MCNC: 44 NG/ML (ref 31–409)
GLUCOSE SERPL-MCNC: 99 MG/DL (ref 70–99)
IRON BINDING CAPACITY (ROCHE): 326 UG/DL (ref 240–430)
IRON SATN MFR SERPL: 21 % (ref 15–46)
IRON SERPL-MCNC: 68 UG/DL (ref 61–157)
POTASSIUM SERPL-SCNC: 4.6 MMOL/L (ref 3.4–5.3)
PROT SERPL-MCNC: 7 G/DL (ref 6.4–8.3)
SODIUM SERPL-SCNC: 137 MMOL/L (ref 135–145)
T4 FREE SERPL-MCNC: 1.56 NG/DL (ref 0.9–1.7)
TSH SERPL DL<=0.005 MIU/L-ACNC: 0.94 UIU/ML (ref 0.3–4.2)

## 2023-10-05 PROCEDURE — G0463 HOSPITAL OUTPT CLINIC VISIT: HCPCS | Performed by: INTERNAL MEDICINE

## 2023-10-05 PROCEDURE — 99215 OFFICE O/P EST HI 40 MIN: CPT | Performed by: INTERNAL MEDICINE

## 2023-10-05 NOTE — PROGRESS NOTES
Patient is here to discuss follow up    /70 (BP Location: Right arm, Patient Position: Chair, Cuff Size: Adult Large)   Pulse 61   Ht 6' (1.829 m)   Wt 266 lb 12.8 oz (121 kg)   SpO2 91%   BMI 36.18 kg/m      Questions patient would like addressed today are: N/A.    Refills are needed: No    Has homecare services and agency name:  Varsha GRANGER

## 2023-10-05 NOTE — PROGRESS NOTES
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 Palencia outside the system now dealing with bilateral lower extremity lymphedema.   When I first saw him in the clinic he had a ulcer which is healed  Recently seen and evaluated by edema therapist  Taking lymphatic formula  Using compression and Velcro wraps     He is able to get around better now and frequently takes his 3 year old puppy out for walks. Recent echo with EF 67%.      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 Started in 2012    Hypothyroidism     Lymphedema     Mumps     Obesity, Class III, BMI 40-49.9 (morbid obesity) (H)     RIN (obstructive sleep apnea)     has  refused CPAP    Pacemaker     Paroxysmal A-fib (H)     PE (pulmonary thromboembolism) (H)        PAST SURGICAL HISTORY:  Past Surgical History:   Procedure Laterality Date    cardiac stenting      CV RIGHT HEART CATH MEASUREMENTS RECORDED N/A 11/5/2019    Procedure: Right Heart Cath;  Surgeon: Roberto Hernandez MD;  Location:  HEART CARDIAC CATH LAB    ENT SURGERY      tonsillectomy    IMPLANT PACEMAKER      INJECT NERVE BLOCK SUPRASCAPULAR Bilateral 8/26/2021    Procedure: BLOCK, NERVE, SUPRASCAPULAR, Bilateral, under ultrasound guidance;  Surgeon: Swetha Allen MD;  Location: [facility] OR    INJECT NERVE BLOCK SUPRASCAPULAR Bilateral 9/23/2021    Procedure: bilateral suprascapular nerve block;  Surgeon: Swetha Allen MD;  Location: [facility] OR    INJECT NERVE BLOCK SUPRASCAPULAR Left 9/15/2022    Procedure: BLOCK, NERVE, SUPRASCAPULAR- left;  Surgeon: Swetha Allen MD;  Location: [facility] OR    INJECT NERVE BLOCK SUPRASCAPULAR Left 10/20/2022    Procedure: BLOCK, NERVE, SUPRASCAPULAR- left;  Surgeon: Swetha Allen MD;  Location: [facility] OR    INJECT NERVE BLOCK SUPRASCAPULAR Left 1/5/2023    Procedure: BLOCK, NERVE, SUPRASCAPULAR (left);  Surgeon: Swetha Allen MD;  Location: [facility] OR    NERVE BLOCK PERIPHERAL Bilateral 3/10/2022    Procedure: bilateral suprascapular nerve block;  Surgeon: Swetha Allen MD;  Location: [facility] OR    ORTHOPEDIC SURGERY Right 2012    knee replacement    REVERSE ARTHROPLASTY SHOULDER Left 2/21/2023    Procedure: ARTHROPLASTY, SHOULDER, TOTAL, REVERSE LEFT;  Surgeon: Katelyn Vasquez MD;  Location: [facility]         CURRENT MEDICATIONS  acetaminophen (TYLENOL) 500 MG tablet, Take 1,000 mg by mouth 3 times daily  Acetylcarnitine HCl (ACETYL L-CARNITINE PO), Take 1 capsule by mouth daily  albuterol (PROAIR HFA/PROVENTIL HFA/VENTOLIN HFA) 108 (90 Base) MCG/ACT inhaler, Inhale 2 puffs into the lungs 4 times daily as needed for shortness of breath or wheezing  aspirin (ASA) 81 MG chewable  tablet, Take 1 tablet by mouth daily  atorvastatin (LIPITOR) 80 MG tablet, Take 1 tablet (80 mg) by mouth every evening  diltiazem ER COATED BEADS (CARDIZEM CD/CARTIA XT) 300 MG 24 hr capsule, Take 1 capsule (300 mg) by mouth daily  ferrous sulfate (SLO-FE) 142 (45 Fe) MG CR tablet, Take 1 tablet (142 mg) by mouth every other day  Fluticasone-Umeclidin-Vilanterol (TRELEGY ELLIPTA) 200-62.5-25 MCG/INH oral inhaler, Inhale 1 puff into the lungs daily  furosemide (LASIX) 40 MG tablet, Take 1 tablet (40 mg) by mouth 2 times daily  isosorbide mononitrate (IMDUR) 30 MG 24 hr tablet, Take 1 tablet (30 mg) by mouth daily  levothyroxine (SYNTHROID/LEVOTHROID) 175 MCG tablet, TAKE 1 TABLET(175 MCG) BY MOUTH DAILY  lisinopril (ZESTRIL) 40 MG tablet, Take 1 tablet (40 mg) by mouth daily  metolazone (ZAROXOLYN) 2.5 MG tablet, TAKE 1 TABLET(2.5 MG) BY MOUTH TWICE DAILY  Multiple vitamin TABS, Take 1 tablet by mouth daily  polyethylene glycol (MIRALAX) 17 g packet, Take 17 g by mouth daily  potassium chloride ER (KLOR-CON M) 10 MEQ CR tablet, Take 1 tablet (10 mEq) by mouth 2 times daily  rivaroxaban ANTICOAGULANT (XARELTO) 20 MG TABS tablet, Take 1 tablet (20 mg) by mouth daily (with dinner)  spironolactone (ALDACTONE) 25 MG tablet, TAKE 1 TABLET(25 MG) BY MOUTH EVERY OTHER DAY  tamsulosin (FLOMAX) 0.4 MG capsule, TAKE 1 CAPSULE(0.4 MG) BY MOUTH DAILY  TAURINE PO, Take 1 capsule by mouth daily  venlafaxine (EFFEXOR) 75 MG tablet, TAKE 1 TABLET(75 MG) BY MOUTH TWICE DAILY    No current facility-administered medications on file prior to visit.      ALLERGIES     Allergies   Allergen Reactions    No Known Allergies        FAMILY HISTORY  Family History   Problem Relation Age of Onset    Brain Cancer Mother     Cerebrovascular Disease Father     Hypertension Father     Anesthesia Reaction No family hx of     Thrombosis No family hx of        SOCIAL HISTORY  Social History     Socioeconomic History    Marital status:       Spouse name: Not on file    Number of children: 5    Years of education: Not on file    Highest education level: Not on file   Occupational History    Occupation: retired teacher at [employer]   Tobacco Use    Smoking status: Former     Packs/day: 0.50     Years: 25.00     Pack years: 12.50     Types: Cigarettes     Start date:      Quit date:      Years since quittin.5    Smokeless tobacco: Never   Vaping Use    Vaping Use: Never used   Substance and Sexual Activity    Alcohol use: No     Comment: quit in ; recovering    Drug use: No       ROS:   Complete ROS negative other than what is stated in HPI.     EXAM:  /70 (BP Location: Right arm, Patient Position: Chair, Cuff Size: Adult Large)   Pulse 61   Ht 6' (1.829 m)   Wt 266 lb 12.8 oz (121 kg)   SpO2 91%   BMI 36.18 kg/m    In general, the patient is a pleasant male in no apparent distress.    HEENT: NC/AT.  PERRLA.  EOMI.  Sclerae white, not injected.    Heart: RRR. Normal S1, S2 splits physiologically. No murmur, rub, click, or gallop.   Lungs: CTA.  No ronchi, wheezes, rales.    Abdomen: Soft, nontender, nondistended.   Extremities: No clubbing, cyanosis. + Bilateral 2+ bilateral lower extremity edema/lymphedema. No wounds. Ulcer on left lower leg is healed. Numerous spider veins throughout both legs.     Labs:  LIPID RESULTS:  Lab Results   Component Value Date    CHOL 130 2023    CHOL 138 2021    HDL 48 2023    HDL 44 2021    LDL 67 2023    LDL 52 2021    TRIG 74 2023    TRIG 209 (H) 2021       LIVER ENZYME RESULTS:  Lab Results   Component Value Date    AST 18 10/04/2023    AST 14 2021    ALT 14 10/04/2023    ALT 26 2021       CBC RESULTS:  Lab Results   Component Value Date    WBC 8.2 10/04/2023    WBC 10.2 2021    RBC 4.14 (L) 10/04/2023    RBC 3.68 (L) 2021    HGB 12.8 (L) 10/04/2023    HGB 12.2 (L) 2021    HCT 41.3 10/04/2023    HCT 36.7 (L)  06/14/2021     10/04/2023     06/14/2021    MCH 30.9 10/04/2023    MCH 33.2 (H) 06/14/2021    MCHC 31.0 (L) 10/04/2023    MCHC 33.2 06/14/2021    RDW 15.4 (H) 10/04/2023    RDW 15.6 (H) 06/14/2021     10/04/2023     06/14/2021       BMP RESULTS:  Lab Results   Component Value Date     10/04/2023     06/21/2021    POTASSIUM 4.6 10/04/2023    POTASSIUM 3.5 10/18/2022    POTASSIUM 4.1 06/21/2021    CHLORIDE 97 (L) 10/04/2023    CHLORIDE 99 10/13/2022    CHLORIDE 103 06/21/2021    CO2 29 10/04/2023    CO2 31 10/13/2022    CO2 29 06/21/2021    ANIONGAP 11 10/04/2023    ANIONGAP 8 10/13/2022    ANIONGAP 6 06/21/2021    GLC 99 10/04/2023     (H) 02/23/2023     (H) 10/13/2022     (H) 06/21/2021    BUN 52.1 (H) 10/04/2023    BUN 43 (H) 10/13/2022    BUN 64 (H) 06/21/2021    CR 1.50 (H) 10/04/2023    CR 1.57 (H) 06/21/2021    GFRESTIMATED 46 (L) 10/04/2023    GFRESTIMATED 40 (L) 06/21/2021    GFRESTBLACK 47 (L) 06/21/2021    LAWSON 9.8 10/04/2023    LAWSON 9.4 06/21/2021        A1C RESULTS:  Lab Results   Component Value Date    A1C 5.6 07/26/2023    A1C 6.1 (H) 06/21/2021       THYROID RESULTS:  Lab Results   Component Value Date    TSH 0.94 10/04/2023    TSH 3.07 10/13/2022    TSH 0.71 06/14/2021         Procedures:   None recently.     Assessment and Plan:   1. Lymphedema (Phlebo-Lymphedema)      2. Varicose veins of bilateral lower extremities with other complications s/p multiple ablations on left leg and one ablation on right leg by Dr. Palencia (outside system)      3. Deep vein thrombosis (DVT) RLE and PE after knee replacement 2012      4. Open wound of left lower extremity, resolved    5. Long term current use of anticoagulants (Xarelto)     6. Chronic diastolic heart failure     7. RIN (obstructive sleep apnea)     8. Paroxysmal A-fib      9. Benign essential hypertension     -Lymphedema improved some with 2 layer wraps by home care RN and left leg ulceration  healed.   -Maintained in knee-high compression socks with ongoing 2+ edema/lymphedema  -He underwent multiple ablations in the legs before -> unclear anymore ablations will be helpful in this patient.    Recommendations:   Follow lymphedema therapist recommendations continue compression, leg wraps, elevation and weight loss  -Elevate legs as able.   -Continue Lymphatic Formula 1000 supplement 1 pill a day.   -Continue to exercise /walk his puppy.   -Continue Xarelto for history of DVT/PE and atrial fibrillation.  -Skin care discussed with the patient  Follow-up with primary and primary cardiologist as planned    Follow-up with me at vascular [facility] in 12 months      40 minutes spent on the date of the encounter doing chart review, history and exam, documentation, and further activities as noted above.  AVS with written instructions given  Reviewed recent edema therapist evaluation, previous imaging studies etc.    Monty Pardo MD , FAJAMI, FSVM, FNLA, FACP  Vascular Medicine

## 2023-10-05 NOTE — PATIENT INSTRUCTIONS
Continue compression, wraps, and take lymphatic formula one pill a day     Follow edema therapist recommendations     See me tigre year

## 2023-10-06 NOTE — RESULT ENCOUNTER NOTE
Can we call him to let him know that his blood counts are stable.  Iron is improved from a few months ago.  Thyroid stable.  Electrolytes stable.    It appears as though his kidney function is slightly worsening.  Up from his baseline.  Be sure that he is staying really well-hydrated.  I spoke with Dr. Heath who recommended repeating in 2 to 3 weeks when he comes back from his trip.  I will place the orders.  If kidney function continues to worsen may need to decrease either the lisinopril or the diuretic.

## 2023-10-12 DIAGNOSIS — I89.0 LYMPHEDEMA: ICD-10-CM

## 2023-10-12 DIAGNOSIS — I48.0 PAROXYSMAL A-FIB (H): ICD-10-CM

## 2023-10-12 DIAGNOSIS — I82.4Z9 DEEP VEIN THROMBOSIS (DVT) OF DISTAL VEIN OF LOWER EXTREMITY, UNSPECIFIED CHRONICITY, UNSPECIFIED LATERALITY (H): ICD-10-CM

## 2023-10-12 RX ORDER — SPIRONOLACTONE 25 MG/1
TABLET ORAL
Qty: 45 TABLET | Refills: 3 | Status: SHIPPED | OUTPATIENT
Start: 2023-10-12 | End: 2024-05-24

## 2023-10-12 RX ORDER — RIVAROXABAN 20 MG/1
20 TABLET, FILM COATED ORAL
Qty: 30 TABLET | Refills: 11 | Status: SHIPPED | OUTPATIENT
Start: 2023-10-12

## 2023-10-12 NOTE — TELEPHONE ENCOUNTER
Unable to refill per [facility] protocol.  Med and pharm loaded.    Requested Prescriptions   Pending Prescriptions Disp Refills    XARELTO ANTICOAGULANT 20 MG TABS tablet [Pharmacy Med Name: XARELTO 20MG TABLETS] 30 tablet 5     Sig: TAKE 1 TABLET(20 MG) BY MOUTH DAILY WITH DINNER       Direct Oral Anticoagulant Agents Failed - 10/12/2023  7:14 AM        Failed - Creatinine Clearance greater than 50 ml/min on file in past 3 mos     No lab results found.          Failed - Serum creatinine less than or equal to 1.4 on file in past 3 mos     Recent Labs   Lab Test 10/04/23  1358   CR 1.50*       Ok to refill medication if creatinine is low          Passed - Normal Platelets on file in past 12 months     Recent Labs   Lab Test 10/04/23  1358                  Passed - Medication is active on med list        Passed - Patient is 18 years of age or older        Passed - Recent (6 mo) or future (30 days) visit within the authorizing provider's specialty             Ivis BARGER, RN    [facility]  Office: [phone]  Fax: [phone]

## 2023-11-06 ENCOUNTER — HOSPITAL ENCOUNTER (EMERGENCY)
Facility: CLINIC | Age: 83
Discharge: HOME | End: 2023-11-06
Attending: EMERGENCY MEDICINE | Admitting: EMERGENCY MEDICINE
Payer: MEDICARE

## 2023-11-06 ENCOUNTER — APPOINTMENT (OUTPATIENT)
Dept: GENERAL RADIOLOGY | Facility: CLINIC | Age: 83
End: 2023-11-06
Attending: STUDENT IN AN ORGANIZED HEALTH CARE EDUCATION/TRAINING PROGRAM
Payer: MEDICARE

## 2023-11-06 ENCOUNTER — NURSE TRIAGE (OUTPATIENT)
Dept: FAMILY MEDICINE | Facility: CLINIC | Age: 83
End: 2023-11-06
Payer: MEDICARE

## 2023-11-06 VITALS
DIASTOLIC BLOOD PRESSURE: 63 MMHG | OXYGEN SATURATION: 91 % | SYSTOLIC BLOOD PRESSURE: 126 MMHG | RESPIRATION RATE: 22 BRPM | HEART RATE: 65 BPM | TEMPERATURE: 98 F

## 2023-11-06 DIAGNOSIS — J44.1 COPD WITH ACUTE EXACERBATION (H): ICD-10-CM

## 2023-11-06 LAB
ALBUMIN SERPL BCG-MCNC: 4.2 G/DL (ref 3.5–5.2)
ALP SERPL-CCNC: 119 U/L (ref 40–129)
ALT SERPL W P-5'-P-CCNC: 18 U/L (ref 0–70)
ANION GAP SERPL CALCULATED.3IONS-SCNC: 11 MMOL/L (ref 7–15)
AST SERPL W P-5'-P-CCNC: 18 U/L (ref 0–45)
BASOPHILS # BLD AUTO: 0.1 10E3/UL (ref 0–0.2)
BASOPHILS NFR BLD AUTO: 1 %
BILIRUB SERPL-MCNC: 0.4 MG/DL
BUN SERPL-MCNC: 33.8 MG/DL (ref 8–23)
CALCIUM SERPL-MCNC: 9.5 MG/DL (ref 8.8–10.2)
CHLORIDE SERPL-SCNC: 98 MMOL/L (ref 98–107)
CREAT SERPL-MCNC: 1.26 MG/DL (ref 0.67–1.17)
DEPRECATED HCO3 PLAS-SCNC: 29 MMOL/L (ref 22–29)
EGFRCR SERPLBLD CKD-EPI 2021: 56 ML/MIN/1.73M2
EOSINOPHIL # BLD AUTO: 0.2 10E3/UL (ref 0–0.7)
EOSINOPHIL NFR BLD AUTO: 2 %
ERYTHROCYTE [DISTWIDTH] IN BLOOD BY AUTOMATED COUNT: 15 % (ref 10–15)
FLUAV RNA SPEC QL NAA+PROBE: NEGATIVE
FLUBV RNA RESP QL NAA+PROBE: NEGATIVE
GLUCOSE SERPL-MCNC: 81 MG/DL (ref 70–99)
HCT VFR BLD AUTO: 36.8 % (ref 40–53)
HGB BLD-MCNC: 11.8 G/DL (ref 13.3–17.7)
HOLD SPECIMEN: 0
HOLD SPECIMEN: NORMAL
IMM GRANULOCYTES # BLD: 0.1 10E3/UL
IMM GRANULOCYTES NFR BLD: 1 %
LYMPHOCYTES # BLD AUTO: 1.9 10E3/UL (ref 0.8–5.3)
LYMPHOCYTES NFR BLD AUTO: 22 %
MCH RBC QN AUTO: 31.6 PG (ref 26.5–33)
MCHC RBC AUTO-ENTMCNC: 32.1 G/DL (ref 31.5–36.5)
MCV RBC AUTO: 99 FL (ref 78–100)
MONOCYTES # BLD AUTO: 1 10E3/UL (ref 0–1.3)
MONOCYTES NFR BLD AUTO: 11 %
NEUTROPHILS # BLD AUTO: 5.7 10E3/UL (ref 1.6–8.3)
NEUTROPHILS NFR BLD AUTO: 63 %
NRBC # BLD AUTO: 0 10E3/UL
NRBC BLD AUTO-RTO: 0 /100
PLATELET # BLD AUTO: 190 10E3/UL (ref 150–450)
POTASSIUM SERPL-SCNC: 3.9 MMOL/L (ref 3.4–5.3)
PROT SERPL-MCNC: 7.1 G/DL (ref 6.4–8.3)
RBC # BLD AUTO: 3.73 10E6/UL (ref 4.4–5.9)
RSV RNA SPEC NAA+PROBE: NEGATIVE
SARS-COV-2 RNA RESP QL NAA+PROBE: NEGATIVE
SODIUM SERPL-SCNC: 138 MMOL/L (ref 135–145)
TROPONIN T SERPL HS-MCNC: 43 NG/L
TROPONIN T SERPL HS-MCNC: 48 NG/L
WBC # BLD AUTO: 8.9 10E3/UL (ref 4–11)

## 2023-11-06 PROCEDURE — 85025 COMPLETE CBC W/AUTO DIFF WBC: CPT | Performed by: EMERGENCY MEDICINE

## 2023-11-06 PROCEDURE — 99285 EMERGENCY DEPT VISIT HI MDM: CPT | Mod: 25

## 2023-11-06 PROCEDURE — 85025 COMPLETE CBC W/AUTO DIFF WBC: CPT | Performed by: STUDENT IN AN ORGANIZED HEALTH CARE EDUCATION/TRAINING PROGRAM

## 2023-11-06 PROCEDURE — 250N000011 HC RX IP 250 OP 636: Mod: JZ | Performed by: EMERGENCY MEDICINE

## 2023-11-06 PROCEDURE — 71046 X-RAY EXAM CHEST 2 VIEWS: CPT

## 2023-11-06 PROCEDURE — 250N000013 HC RX MED GY IP 250 OP 250 PS 637: Performed by: EMERGENCY MEDICINE

## 2023-11-06 PROCEDURE — 94640 AIRWAY INHALATION TREATMENT: CPT

## 2023-11-06 PROCEDURE — 36415 COLL VENOUS BLD VENIPUNCTURE: CPT | Performed by: STUDENT IN AN ORGANIZED HEALTH CARE EDUCATION/TRAINING PROGRAM

## 2023-11-06 PROCEDURE — 93005 ELECTROCARDIOGRAM TRACING: CPT

## 2023-11-06 PROCEDURE — 87637 SARSCOV2&INF A&B&RSV AMP PRB: CPT | Performed by: EMERGENCY MEDICINE

## 2023-11-06 PROCEDURE — 96374 THER/PROPH/DIAG INJ IV PUSH: CPT

## 2023-11-06 PROCEDURE — 80053 COMPREHEN METABOLIC PANEL: CPT | Performed by: STUDENT IN AN ORGANIZED HEALTH CARE EDUCATION/TRAINING PROGRAM

## 2023-11-06 PROCEDURE — 36415 COLL VENOUS BLD VENIPUNCTURE: CPT | Performed by: EMERGENCY MEDICINE

## 2023-11-06 PROCEDURE — 84484 ASSAY OF TROPONIN QUANT: CPT | Performed by: STUDENT IN AN ORGANIZED HEALTH CARE EDUCATION/TRAINING PROGRAM

## 2023-11-06 PROCEDURE — 250N000009 HC RX 250: Performed by: EMERGENCY MEDICINE

## 2023-11-06 PROCEDURE — 80053 COMPREHEN METABOLIC PANEL: CPT | Performed by: EMERGENCY MEDICINE

## 2023-11-06 PROCEDURE — 84484 ASSAY OF TROPONIN QUANT: CPT | Performed by: EMERGENCY MEDICINE

## 2023-11-06 RX ORDER — ALBUTEROL SULFATE 90 UG/1
2 AEROSOL, METERED RESPIRATORY (INHALATION) EVERY 6 HOURS PRN
Qty: 18 G | Refills: 0 | Status: SHIPPED | OUTPATIENT
Start: 2023-11-06 | End: 2023-12-18

## 2023-11-06 RX ORDER — IPRATROPIUM BROMIDE AND ALBUTEROL SULFATE 2.5; .5 MG/3ML; MG/3ML
3 SOLUTION RESPIRATORY (INHALATION) ONCE
Status: COMPLETED | OUTPATIENT
Start: 2023-11-06 | End: 2023-11-06

## 2023-11-06 RX ORDER — DOXYCYCLINE 100 MG/1
100 CAPSULE ORAL ONCE
Status: COMPLETED | OUTPATIENT
Start: 2023-11-06 | End: 2023-11-06

## 2023-11-06 RX ORDER — PREDNISONE 20 MG/1
TABLET ORAL
Qty: 10 TABLET | Refills: 0 | Status: SHIPPED | OUTPATIENT
Start: 2023-11-06 | End: 2023-12-18

## 2023-11-06 RX ORDER — METHYLPREDNISOLONE SODIUM SUCCINATE 125 MG/2ML
125 INJECTION, POWDER, LYOPHILIZED, FOR SOLUTION INTRAMUSCULAR; INTRAVENOUS ONCE
Status: COMPLETED | OUTPATIENT
Start: 2023-11-06 | End: 2023-11-06

## 2023-11-06 RX ORDER — DOXYCYCLINE 100 MG/1
100 CAPSULE ORAL 2 TIMES DAILY
Qty: 14 CAPSULE | Refills: 0 | Status: SHIPPED | OUTPATIENT
Start: 2023-11-06 | End: 2023-11-13

## 2023-11-06 RX ADMIN — IPRATROPIUM BROMIDE AND ALBUTEROL SULFATE 3 ML: .5; 3 SOLUTION RESPIRATORY (INHALATION) at 12:50

## 2023-11-06 RX ADMIN — DOXYCYCLINE HYCLATE 100 MG: 100 CAPSULE ORAL at 12:50

## 2023-11-06 RX ADMIN — METHYLPREDNISOLONE SODIUM SUCCINATE 125 MG: 125 INJECTION, POWDER, FOR SOLUTION INTRAMUSCULAR; INTRAVENOUS at 12:49

## 2023-11-06 ASSESSMENT — ACTIVITIES OF DAILY LIVING (ADL)
ADLS_ACUITY_SCORE: 35
ADLS_ACUITY_SCORE: 35

## 2023-11-06 NOTE — ED TRIAGE NOTES
Pt with hx of copd reports increasing sob x1.5 weeks.      Triage Assessment (Adult)       Row Name 11/06/23 1006          Respiratory WDL    Respiratory WDL X  sob        Cardiac WDL    Cardiac WDL WDL        Cognitive/Neuro/Behavioral WDL    Cognitive/Neuro/Behavioral WDL WDL

## 2023-11-06 NOTE — TELEPHONE ENCOUNTER
"Nurse Triage SBAR    Is this a 2nd Level Triage? NO    Situation: Patient calling clinic reporting difficulty breathing with activity. Patient stated he has had worsening breathing more recently after suffering from a head cold.     Background: Patient endorses hx of COPD and is taking Xarelto for prior blood clot.     Assessment: Patient denied chest pain, patient was speaking in full sentences and was not SOB at rest. Patient stated his chest \"feels heavy\" with congestion and has been coughing up \"white phlegm\". Patient denies recent Covid exposure and denies fever.    Protocol Recommended Disposition:   Go to ED Now    Recommendation: Per disposition, RN advised patient to be seen in ED for immediate evaluation. Patient agreed with plan of care and will contact family member to bring him to ED now.     Does the patient meet one of the following criteria for ADS visit consideration? 16+ years old, with an [facility] PCP     TIP  Providers, please consider if this condition is appropriate for management at one of our Acute and Diagnostic Services sites.     If patient is a good candidate, please use dotphrase <dot>triageresponse and select Refer to ADS to document.    Reason for Disposition   Any history of prior \"blood clot\" in leg or lungs    Additional Information   Negative: SEVERE difficulty breathing (e.g., struggling for each breath, speaks in single words, pulse > 120)   Negative: Breathing stopped and hasn't returned   Negative: Choking on something   Negative: Bluish (or gray) lips or face   Negative: Difficult to awaken or acting confused (e.g., disoriented, slurred speech)   Negative: Passed out (i.e., fainted, collapsed and was not responding)   Negative: Wheezing started suddenly after medicine, an allergic food, or bee sting   Negative: Stridor (harsh sound while breathing in)   Negative: Slow, shallow and weak breathing   Negative: Sounds like a life-threatening emergency to the triager   Negative: Chest " pain   Negative: Wheezing (high pitched whistling sound) and previous asthma attacks or use of asthma medicines   Negative: Difficulty breathing and within 14 days of COVID-19 Exposure   Negative: Difficulty breathing and only present when coughing   Negative: Difficulty breathing and only from stuffy nose   Negative: Difficulty breathing and only from stuffy nose or runny nose from common cold   Negative: MODERATE difficulty breathing (e.g., speaks in phrases, SOB even at rest, pulse 100-120) of new-onset or worse than normal   Negative: Oxygen level (e.g., pulse oximetry) 90% or lower   Negative: Wheezing can be heard across the room   Negative: Drooling or spitting out saliva (because can't swallow)    Protocols used: Breathing Difficulty-A-OH

## 2023-11-06 NOTE — ED PROVIDER NOTES
History     Chief Complaint:  Shortness of Breath       HPI   Erich Craven is a 83 year old male with a history of COPD and chronic diastolic heart failure who presents with upper respiratory type symptoms including mildly productive cough, wheezing, and shortness of breath for the past week and 1/2 to 2 weeks.  He denies any fevers.  He states that he has been having swelling in the bilateral extremities but this is helped by his compression socks and he does take Lasix has not missed any doses.  He is on Xarelto for history of PE.  He denies any chest pain with this.  No sore throat, nausea, vomiting or diarrhea.      Independent Historian:    The patient    Review of External Notes:  None    Medications:    albuterol (PROAIR HFA/PROVENTIL HFA/VENTOLIN HFA) 108 (90 Base) MCG/ACT inhaler  doxycycline hyclate (VIBRAMYCIN) 100 MG capsule  predniSONE (DELTASONE) 20 MG tablet  acetaminophen (TYLENOL) 500 MG tablet  Acetylcarnitine HCl (ACETYL L-CARNITINE PO)  albuterol (PROAIR HFA/PROVENTIL HFA/VENTOLIN HFA) 108 (90 Base) MCG/ACT inhaler  aspirin (ASA) 81 MG chewable tablet  atorvastatin (LIPITOR) 80 MG tablet  diltiazem ER COATED BEADS (CARDIZEM CD/CARTIA XT) 300 MG 24 hr capsule  ferrous sulfate (SLO-FE) 142 (45 Fe) MG CR tablet  Fluticasone-Umeclidin-Vilanterol (TRELEGY ELLIPTA) 200-62.5-25 MCG/INH oral inhaler  furosemide (LASIX) 40 MG tablet  isosorbide mononitrate (IMDUR) 30 MG 24 hr tablet  levothyroxine (SYNTHROID/LEVOTHROID) 175 MCG tablet  lisinopril (ZESTRIL) 40 MG tablet  metolazone (ZAROXOLYN) 2.5 MG tablet  Multiple vitamin TABS  polyethylene glycol (MIRALAX) 17 g packet  potassium chloride ER (KLOR-CON M) 10 MEQ CR tablet  spironolactone (ALDACTONE) 25 MG tablet  tamsulosin (FLOMAX) 0.4 MG capsule  TAURINE PO  venlafaxine (EFFEXOR) 75 MG tablet  XARELTO ANTICOAGULANT 20 MG TABS tablet        Past Medical History:    Past Medical History:   Diagnosis Date    (HFpEF) heart failure with preserved  ejection fraction (H)     Alcohol dependence in remission (H)     Arthritis     BPH with urinary obstruction     CAD (coronary artery disease)     Cardiac pacemaker in situ     Chronic diastolic heart failure (H)     Complication of anesthesia     COPD (chronic obstructive pulmonary disease) (H)     Diabetes (H)     DVT (deep venous thrombosis) (H)     Dyspnea     Essential hypertension     Fatigue     HLD (hyperlipidemia)     Hx of long term use of blood thinners     Hypothyroidism     Lymphedema     Mumps     Obesity, Class III, BMI 40-49.9 (morbid obesity) (H)     RIN (obstructive sleep apnea)     Pacemaker     Paroxysmal A-fib (H)     PE (pulmonary thromboembolism) (H)        Past Surgical History:    Past Surgical History:   Procedure Laterality Date    cardiac stenting      CV RIGHT HEART CATH MEASUREMENTS RECORDED N/A 11/5/2019    Procedure: Right Heart Cath;  Surgeon: Roberto Hernandez MD;  [facility]    ENT SURGERY      tonsillectomy    IMPLANT PACEMAKER      INJECT NERVE BLOCK SUPRASCAPULAR Bilateral 8/26/2021    Procedure: BLOCK, NERVE, SUPRASCAPULAR, Bilateral, under ultrasound guidance;  Surgeon: Swetha Allen MD;  Location: [facility] OR    INJECT NERVE BLOCK SUPRASCAPULAR Bilateral 9/23/2021    Procedure: bilateral suprascapular nerve block;  Surgeon: Swetha Allen MD;  Location: [facility] OR    INJECT NERVE BLOCK SUPRASCAPULAR Left 9/15/2022    Procedure: BLOCK, NERVE, SUPRASCAPULAR- left;  Surgeon: Swetha Allen MD;  Location: [facility] OR    INJECT NERVE BLOCK SUPRASCAPULAR Left 10/20/2022    Procedure: BLOCK, NERVE, SUPRASCAPULAR- left;  Surgeon: Swetha Allen MD;  Location: [facility] OR    INJECT NERVE BLOCK SUPRASCAPULAR Left 1/5/2023    Procedure: BLOCK, NERVE, SUPRASCAPULAR (left);  Surgeon: Swetha Allen MD;  Location: [facility] OR    NERVE BLOCK PERIPHERAL Bilateral 3/10/2022    Procedure: bilateral suprascapular nerve block;  Surgeon: Swetha Allen MD;  Location: [facility] OR     ORTHOPEDIC SURGERY Right 2012    knee replacement    REVERSE ARTHROPLASTY SHOULDER Left 2/21/2023    Procedure: ARTHROPLASTY, SHOULDER, TOTAL, REVERSE LEFT;  Surgeon: Katelyn Vasquez MD;  Location: [facility]          Physical Exam   Patient Vitals for the past 24 hrs:   BP Temp Pulse Resp SpO2   11/06/23 1430 -- -- -- -- 91 %   11/06/23 1415 -- -- -- -- 92 %   11/06/23 1400 -- -- -- -- 94 %   11/06/23 1345 -- -- -- -- 91 %   11/06/23 1330 -- -- -- -- 91 %   11/06/23 1315 -- -- -- -- 96 %   11/06/23 1300 -- -- -- -- 100 %   11/06/23 1007 126/63 98  F (36.7  C) 65 22 94 %        Physical Exam  Constitutional: Vital signs reviewed as above  General: Alert, pleasant  HEENT: Moist mucous membranes  Eyes: Pupils are equal, round, and reactive to light.   Neck: Normal range of motion  Cardiovascular: normal rate, Regular rhythm and normal heart sounds.  No MRG  Pulmonary/Chest: Tachypneic, bilateral expiratory wheeze, no rhonchi.  Speaking in full sentences.  Gastrointestinal: Soft. Positive bowel sounds. No MRG.  Musculoskeletal/Extremities: Full ROM.  Endo: 1+ edema to the knees bilaterally.  Neurological: Alert, no focal deficits.  Skin: Skin is warm and dry.   Psychiatric: Pleasant      Emergency Department Course   ECG  Normal sinus rhythm with first-degree AV block.  Rate of 68, no acute concerning ST or T wave changes    Imaging:  Chest XR,  PA & LAT   Final Result   IMPRESSION: Bilateral shoulder arthroplasties and stable dual lead   left-sided cardiac conduction device. Stable mild asymmetric elevation   of the right hemidiaphragm. No acute findings. The lungs are clear and   there are no pleural effusions. Normal heart size.      MAGED DUENAS MD            SYSTEM ID:  [id number]        Report per radiology    Laboratory:  Labs Ordered and Resulted from Time of ED Arrival to Time of ED Departure   COMPREHENSIVE METABOLIC PANEL - Abnormal       Result Value    Sodium 138      Potassium 3.9      Carbon  Dioxide (CO2) 29      Anion Gap 11      Urea Nitrogen 33.8 (*)     Creatinine 1.26 (*)     GFR Estimate 56 (*)     Calcium 9.5      Chloride 98      Glucose 81      Alkaline Phosphatase 119      AST 18      ALT 18      Protein Total 7.1      Albumin 4.2      Bilirubin Total 0.4     TROPONIN T, HIGH SENSITIVITY - Abnormal    Troponin T, High Sensitivity 48 (*)    CBC WITH PLATELETS AND DIFFERENTIAL - Abnormal    WBC Count 8.9      RBC Count 3.73 (*)     Hemoglobin 11.8 (*)     Hematocrit 36.8 (*)     MCV 99      MCH 31.6      MCHC 32.1      RDW 15.0      Platelet Count 190      % Neutrophils 63      % Lymphocytes 22      % Monocytes 11      % Eosinophils 2      % Basophils 1      % Immature Granulocytes 1      NRBCs per 100 WBC 0      Absolute Neutrophils 5.7      Absolute Lymphocytes 1.9      Absolute Monocytes 1.0      Absolute Eosinophils 0.2      Absolute Basophils 0.1      Absolute Immature Granulocytes 0.1      Absolute NRBCs 0.0     INFLUENZA A/B, RSV, & SARS-COV2 PCR - Normal    Influenza A PCR Negative      Influenza B PCR Negative      RSV PCR Negative      SARS CoV2 PCR Negative     TROPONIN T, HIGH SENSITIVITY          Emergency Department Course & Assessments:      Interventions:  Medications   methylPREDNISolone sodium succinate (solu-MEDROL) injection 125 mg (125 mg Intravenous $Given 11/6/23 1249)   ipratropium - albuterol 0.5 mg/2.5 mg/3 mL (DUONEB) neb solution 3 mL (3 mLs Nebulization $Given 11/6/23 1250)   doxycycline hyclate (VIBRAMYCIN) capsule 100 mg (100 mg Oral $Given 11/6/23 1250)          Independent Interpretation (X-rays, CTs, rhythm strip):  Chest x-ray shows no evidence of pneumonia, pneumothorax, or effusion.    Social Determinants of Health affecting care:  None     Disposition:  The patient was discharged to home.     Impression & Plan    Medical Decision Making:  Patient presents emergency department with concerns over shortness of breath and wheezing.  He does have COPD.  He also  has CHF and states that he normally has swelling to the legs but this has been better on the Lasix as well as his compression devices that he wears on his legs.  His chest x-ray here was negative for any signs of pneumonia there is also no signs of overt heart failure here either.  EKG is nondiagnostic.  Initial troponin did come back elevated at 48.  He is not having any chest discomfort.  Delta troponin shows unfortunately hemolyzed.  It has been redrawn.  Rest of his basic labs did not show any acute concerning abnormalities.  I do suspect this is likely a COPD exacerbation.  He received a DuoNeb here as well as IV Solu-Medrol and a dose of doxycycline.  I will discharge him home with albuterol, prednisone, the rest of doxycycline.  He will be signed out to my colleague, Dr. Glynn, pending the results of his delta troponin.  I assume that this will be flat as he is not exhibiting symptoms of acute coronary syndrome.  If indeed it is he will be discharged to home with the above-mentioned prescriptions.      Diagnosis:    ICD-10-CM    1. COPD with acute exacerbation (H)  J44.1            Discharge Medications:  New Prescriptions    ALBUTEROL (PROAIR HFA/PROVENTIL HFA/VENTOLIN HFA) 108 (90 BASE) MCG/ACT INHALER    Inhale 2 puffs into the lungs every 6 hours as needed for shortness of breath, wheezing or cough    DOXYCYCLINE HYCLATE (VIBRAMYCIN) 100 MG CAPSULE    Take 1 capsule (100 mg) by mouth 2 times daily for 7 days    PREDNISONE (DELTASONE) 20 MG TABLET    Take two tablets (= 40mg) each day for 5 (five) days          Mika Macias MD  11/6/2023   Mika Macias MD Walters, Brent Aaron, MD  11/06/23 2096

## 2023-11-07 LAB
ATRIAL RATE - MUSE: 68 BPM
DIASTOLIC BLOOD PRESSURE - MUSE: NORMAL MMHG
INTERPRETATION ECG - MUSE: NORMAL
P AXIS - MUSE: 32 DEGREES
PR INTERVAL - MUSE: 212 MS
QRS DURATION - MUSE: 104 MS
QT - MUSE: 420 MS
QTC - MUSE: 446 MS
R AXIS - MUSE: 28 DEGREES
SYSTOLIC BLOOD PRESSURE - MUSE: NORMAL MMHG
T AXIS - MUSE: 19 DEGREES
VENTRICULAR RATE- MUSE: 68 BPM

## 2023-11-09 ENCOUNTER — MYC MEDICAL ADVICE (OUTPATIENT)
Dept: NEPHROLOGY | Facility: CLINIC | Age: 83
End: 2023-11-09
Payer: MEDICARE

## 2023-11-09 ENCOUNTER — MYC MEDICAL ADVICE (OUTPATIENT)
Dept: FAMILY MEDICINE | Facility: CLINIC | Age: 83
End: 2023-11-09
Payer: MEDICARE

## 2023-11-09 ENCOUNTER — MYC REFILL (OUTPATIENT)
Dept: FAMILY MEDICINE | Facility: CLINIC | Age: 83
End: 2023-11-09
Payer: MEDICARE

## 2023-11-09 DIAGNOSIS — I50.30 HEART FAILURE WITH PRESERVED EJECTION FRACTION, UNSPECIFIED HF CHRONICITY (H): ICD-10-CM

## 2023-11-09 DIAGNOSIS — E03.9 HYPOTHYROIDISM, UNSPECIFIED TYPE: ICD-10-CM

## 2023-11-09 DIAGNOSIS — N18.2 CHRONIC RENAL DISEASE, STAGE II: Primary | ICD-10-CM

## 2023-11-09 RX ORDER — LEVOTHYROXINE SODIUM 175 UG/1
175 TABLET ORAL DAILY
Qty: 90 TABLET | Refills: 0 | Status: SHIPPED | OUTPATIENT
Start: 2023-11-09 | End: 2024-01-24

## 2023-11-09 NOTE — TELEPHONE ENCOUNTER
Appointments in Next Year      Nov 27, 2023  8:15 AM  CARDIAC DEVICE CHECK - IN CLINIC with LORI LYLES  [facility] ([facility] ) [phone]

## 2023-11-10 RX ORDER — ISOSORBIDE MONONITRATE 30 MG/1
30 TABLET, EXTENDED RELEASE ORAL DAILY
Qty: 90 TABLET | Refills: 3 | OUTPATIENT
Start: 2023-11-10

## 2023-11-15 ENCOUNTER — TRANSFERRED RECORDS (OUTPATIENT)
Dept: HEALTH INFORMATION MANAGEMENT | Facility: CLINIC | Age: 83
End: 2023-11-15
Payer: MEDICARE

## 2023-11-17 ENCOUNTER — TRANSCRIBE ORDERS (OUTPATIENT)
Dept: OTHER | Age: 83
End: 2023-11-17

## 2023-11-17 ENCOUNTER — OFFICE VISIT (OUTPATIENT)
Dept: FAMILY MEDICINE | Facility: CLINIC | Age: 83
End: 2023-11-17
Payer: MEDICARE

## 2023-11-17 VITALS
BODY MASS INDEX: 35.89 KG/M2 | RESPIRATION RATE: 18 BRPM | WEIGHT: 265 LBS | SYSTOLIC BLOOD PRESSURE: 105 MMHG | OXYGEN SATURATION: 97 % | TEMPERATURE: 98 F | DIASTOLIC BLOOD PRESSURE: 62 MMHG | HEIGHT: 72 IN | HEART RATE: 77 BPM

## 2023-11-17 DIAGNOSIS — E66.01 OBESITY, CLASS III, BMI 40-49.9 (MORBID OBESITY) (H): ICD-10-CM

## 2023-11-17 DIAGNOSIS — E03.9 HYPOTHYROIDISM, UNSPECIFIED TYPE: Primary | ICD-10-CM

## 2023-11-17 DIAGNOSIS — G47.33 OSA (OBSTRUCTIVE SLEEP APNEA): ICD-10-CM

## 2023-11-17 DIAGNOSIS — R26.81 UNSTEADY GAIT: ICD-10-CM

## 2023-11-17 DIAGNOSIS — I10 PRIMARY HYPERTENSION: ICD-10-CM

## 2023-11-17 DIAGNOSIS — I48.0 PAROXYSMAL A-FIB (H): ICD-10-CM

## 2023-11-17 DIAGNOSIS — J42 CHRONIC BRONCHITIS, UNSPECIFIED CHRONIC BRONCHITIS TYPE (H): ICD-10-CM

## 2023-11-17 DIAGNOSIS — J44.9 COPD, MODERATE (H): Primary | ICD-10-CM

## 2023-11-17 PROCEDURE — 99214 OFFICE O/P EST MOD 30 MIN: CPT | Performed by: INTERNAL MEDICINE

## 2023-11-17 ASSESSMENT — PAIN SCALES - GENERAL: PAINLEVEL: NO PAIN (0)

## 2023-11-17 NOTE — PATIENT INSTRUCTIONS
Use Flonase 2 puffs in each nose for the next week or 2 helps with postnasal drip since you are having hoarseness in the voice.    Also can use saline rinses couple times in the nose nasal spray    Can use Mucinex 1 tablet twice a day for coughing/bronchitis for the next week or so.    Move your metolazone and Lasix early hours in the afternoon so you do not have to have nocturia multiple times during the night.    You can decrease the Lasix dose in the afternoon to half tablet unless the swelling increases this is because you are having some dizzy spells.    Schedule with occupational therapy for unsteady gait referral has been placed.

## 2023-11-17 NOTE — PROGRESS NOTES
Assessment & Plan   Problem List Items Addressed This Visit          Respiratory    RIN (obstructive sleep apnea)    COPD (chronic obstructive pulmonary disease) (H)       Digestive    Obesity, Class III, BMI 40-49.9 (morbid obesity) (H)       Endocrine    Hypothyroidism - Primary    Relevant Orders    TSH with free T4 reflex       Circulatory    HTN (hypertension)    Paroxysmal A-fib (H)     Other Visit Diagnoses       Unsteady gait        Relevant Orders    Occupational Therapy Referral           Jaun is presenting for follow-up.  I do not see need for labs today we will continue with the tingling numbness in hands.  His vitals hemodynamics are stable he can back off lipid on the Lasix, take half a pill in the afternoon of Lasix he is already on metolazone as well and he is on Imdur and card Cardizem that can drop his blood pressure and cause orthostatic symptoms.  His left function of the heart is good he has slight RV dysfunction and mild to moderate pulmonary hypertension he is euvolemic by history and exam.  He remains on chronic anticoag, no bleeding issues.  He is on slow iron supplements as his ferritin on the low side he has mild anemia.  Has frequency being on diuretics.  He can move the Lasix earlier in the afternoon    Occasional PPI omeprazole in the morning.  We will add a TSH to be done with his next labs    Uses a cane when he is outdoors walking his dog.    He has still has some clear phlegm with the cough feels in his chest I do not think he has acute bronchitis or infection at this point can use Mucinex over-the-counter as needed           MED REC REQUIRED  Post Medication Reconciliation Status: discharge medications reconciled, continue medications without change  BMI:   Estimated body mass index is 35.94 kg/m  as calculated from the following:    Height as of this encounter: 1.829 m (6').    Weight as of this encounter: 120.2 kg (265 lb).   Weight management plan: Discussed healthy diet and  exercise guidelines      Anne Heath MD  [employer] HERNAN Zamorano is a 83 year old, presenting for the following health issues:  ER F/U (Better, saw pulmonologist yesterday, reported some stomach pain last week)        11/17/2023    10:44 AM   Additional Questions   Roomed by Willie   Accompanied by Not applicable, by themselves       HPI     ED/UC Followup:    Facility:  [facility] Emergency Dept  Date of visit: 11/6/2023  Reason for visit: Shortness of Breath  Current Status: better, stable    Jaun is presenting for hospital follow-up.  He had what he seems some COPD exacerbation is doing better now and some clear phlegm.  He is breathing comfortably is pulse ox 97% room air.  He has occasional orthostatic dizziness he is on 2 diuretics and spironolactone.  Blood pressure runs in the low 100 systolic.  He is also on Imdur and Cardizem that can precipitate or augment his dizziness symptoms.  He is feeling some numbness and tingling in his hands that started with his chest discomfort.  His calcium level is normal his kidney function test is borderline at baseline kidney disease stage IIIa follows with nephrology in this months.  He was given a spacer for his inhaler he is on Trelegy well-tolerated.  He has Velcro wraps on his lower extremity.  His weight has been stable at 265 pounds.  He has a lot of fruits and salads.  Does not eat much meat.  His BMI is 35.9 obesity range.    Review of Systems   Constitutional, HEENT, cardiovascular, pulmonary, gi and gu systems are negative, except as otherwise noted.      Objective    /62 (BP Location: Right arm, Patient Position: Sitting, Cuff Size: Adult Large)   Pulse 77   Temp 98  F (36.7  C) (Oral)   Resp 18   Ht 1.829 m (6')   Wt 120.2 kg (265 lb)   SpO2 97%   BMI 35.94 kg/m    Body mass index is 35.94 kg/m .  Physical Exam   GENERAL: healthy, alert and no distress  EYES: Eyes grossly normal to inspection,  PERRL and conjunctivae and sclerae normal  HENT: ear canals and TM's normal, nose and mouth without ulcers or lesions  NECK: no adenopathy, no asymmetry, masses, or scars and thyroid normal to palpation  RESP: lungs clear to auscultation - no rales, rhonchi or wheezes  CV: regular rate and rhythm, normal S1 S2, no S3 or S4, no murmur, click or rub, no peripheral edema and peripheral pulses strong  ABDOMEN: soft, nontender, no hepatosplenomegaly, no masses and bowel sounds normal  MS: no gross musculoskeletal defects noted, no edema  SKIN: no suspicious lesions or rashes  NEURO: Normal strength and tone, mentation intact and speech normal  PSYCH: mentation appears normal, affect normal/bright    Admission on 11/06/2023, Discharged on 11/06/2023   Component Date Value Ref Range Status    Sodium 11/06/2023 138  135 - 145 mmol/L Final    Reference intervals for this test were updated on 09/26/2023 to more accurately reflect our healthy population. There may be differences in the flagging of prior results with similar values performed with this method. Interpretation of those prior results can be made in the context of the updated reference intervals.     Potassium 11/06/2023 3.9  3.4 - 5.3 mmol/L Final    Carbon Dioxide (CO2) 11/06/2023 29  22 - 29 mmol/L Final    Anion Gap 11/06/2023 11  7 - 15 mmol/L Final    Urea Nitrogen 11/06/2023 33.8 (H)  8.0 - 23.0 mg/dL Final    Creatinine 11/06/2023 1.26 (H)  0.67 - 1.17 mg/dL Final    GFR Estimate 11/06/2023 56 (L)  >60 mL/min/1.73m2 Final    Calcium 11/06/2023 9.5  8.8 - 10.2 mg/dL Final    Chloride 11/06/2023 98  98 - 107 mmol/L Final    Glucose 11/06/2023 81  70 - 99 mg/dL Final    Alkaline Phosphatase 11/06/2023 119  40 - 129 U/L Final    AST 11/06/2023 18  0 - 45 U/L Final    Reference intervals for this test were updated on 6/12/2023 to more accurately reflect our healthy population. There may be differences in the flagging of prior results with similar values performed  with this method. Interpretation of those prior results can be made in the context of the updated reference intervals.    ALT 11/06/2023 18  0 - 70 U/L Final    Reference intervals for this test were updated on 6/12/2023 to more accurately reflect our healthy population. There may be differences in the flagging of prior results with similar values performed with this method. Interpretation of those prior results can be made in the context of the updated reference intervals.      Protein Total 11/06/2023 7.1  6.4 - 8.3 g/dL Final    Albumin 11/06/2023 4.2  3.5 - 5.2 g/dL Final    Bilirubin Total 11/06/2023 0.4  <=1.2 mg/dL Final    Troponin T, High Sensitivity 11/06/2023 48 (H)  <=22 ng/L Final    Either a High Sensitivity Troponin T baseline (0 hours) value = 100 ng/L, or an increase in High Sensitivity Troponin T = 7 ng/L at 2 hours compared to 0 hours (2-0 hours), suggests myocardial injury, and urgent clinical attention is required.    If the 2-0 hours increase is <7 ng/L, a High Sensitivity Troponin T result above gender-specific reference ranges warrants further evaluation.   Recommendations for further evaluation include correlation with clinical decision-making tool (e.g., HEART), a 3rd High Sensitivity Troponin T test 2 hours after the 2nd (a 20% change from baseline would represent concern), admission for observation, close PCC/cardiology follow-up, or urgent outpatient provocative testing.    WBC Count 11/06/2023 8.9  4.0 - 11.0 10e3/uL Final    RBC Count 11/06/2023 3.73 (L)  4.40 - 5.90 10e6/uL Final    Hemoglobin 11/06/2023 11.8 (L)  13.3 - 17.7 g/dL Final    Hematocrit 11/06/2023 36.8 (L)  40.0 - 53.0 % Final    MCV 11/06/2023 99  78 - 100 fL Final    MCH 11/06/2023 31.6  26.5 - 33.0 pg Final    MCHC 11/06/2023 32.1  31.5 - 36.5 g/dL Final    RDW 11/06/2023 15.0  10.0 - 15.0 % Final    Platelet Count 11/06/2023 190  150 - 450 10e3/uL Final    % Neutrophils 11/06/2023 63  % Final    % Lymphocytes  11/06/2023 22  % Final    % Monocytes 11/06/2023 11  % Final    % Eosinophils 11/06/2023 2  % Final    % Basophils 11/06/2023 1  % Final    % Immature Granulocytes 11/06/2023 1  % Final    NRBCs per 100 WBC 11/06/2023 0  <1 /100 Final    Absolute Neutrophils 11/06/2023 5.7  1.6 - 8.3 10e3/uL Final    Absolute Lymphocytes 11/06/2023 1.9  0.8 - 5.3 10e3/uL Final    Absolute Monocytes 11/06/2023 1.0  0.0 - 1.3 10e3/uL Final    Absolute Eosinophils 11/06/2023 0.2  0.0 - 0.7 10e3/uL Final    Absolute Basophils 11/06/2023 0.1  0.0 - 0.2 10e3/uL Final    Absolute Immature Granulocytes 11/06/2023 0.1  <=0.4 10e3/uL Final    Absolute NRBCs 11/06/2023 0.0  10e3/uL Final    Hold Specimen 11/06/2023 0   Final    Hold Specimen 11/06/2023 JI   Final    Influenza A PCR 11/06/2023 Negative  Negative Final    Influenza B PCR 11/06/2023 Negative  Negative Final    RSV PCR 11/06/2023 Negative  Negative Final    SARS CoV2 PCR 11/06/2023 Negative  Negative Final    NEGATIVE: SARS-CoV-2 (COVID-19) RNA not detected, presumed negative.    Troponin T, High Sensitivity 11/06/2023 43 (H)  <=22 ng/L Final    Either a High Sensitivity Troponin T baseline (0 hours) value = 100 ng/L, or an increase in High Sensitivity Troponin T = 7 ng/L at 2 hours compared to 0 hours (2-0 hours), suggests myocardial injury, and urgent clinical attention is required.    If the 2-0 hours increase is <7 ng/L, a High Sensitivity Troponin T result above gender-specific reference ranges warrants further evaluation.   Recommendations for further evaluation include correlation with clinical decision-making tool (e.g., HEART), a 3rd High Sensitivity Troponin T test 2 hours after the 2nd (a 20% change from baseline would represent concern), admission for observation, close PCC/cardiology follow-up, or urgent outpatient provocative testing.    Ventricular Rate 11/06/2023 68  BPM Final    Atrial Rate 11/06/2023 68  BPM Final    FL Interval 11/06/2023 212  ms Final    QRS  Duration 11/06/2023 104  ms Final    QT 11/06/2023 420  ms Final    QTc 11/06/2023 446  ms Final    P Axis 11/06/2023 32  degrees Final    R AXIS 11/06/2023 28  degrees Final    T [place] 11/06/2023 19  degrees Final    Interpretation ECG 11/06/2023    Final                    Value:Sinus rhythm with 1st degree A-V block  Otherwise normal ECG  When compared with ECG of 27-MAR-2022 18:26,  ST no longer depressed in Lateral leads  T wave inversion less evident in Lateral leads  Confirmed by GENERATED REPORT, COMPUTER ([id number]),  Dominic Estes ([id number]) on 11/7/2023 2:51:11 AM

## 2023-11-18 ENCOUNTER — HEALTH MAINTENANCE LETTER (OUTPATIENT)
Age: 83
End: 2023-11-18

## 2023-11-20 DIAGNOSIS — R26.89 BALANCE PROBLEM: Primary | ICD-10-CM

## 2023-11-21 ENCOUNTER — VIRTUAL VISIT (OUTPATIENT)
Dept: PHARMACY | Facility: CLINIC | Age: 83
End: 2023-11-21
Payer: COMMERCIAL

## 2023-11-21 DIAGNOSIS — I26.99 PE (PULMONARY THROMBOEMBOLISM) (H): ICD-10-CM

## 2023-11-21 DIAGNOSIS — I25.10 CORONARY ARTERY DISEASE INVOLVING NATIVE HEART WITHOUT ANGINA PECTORIS, UNSPECIFIED VESSEL OR LESION TYPE: ICD-10-CM

## 2023-11-21 DIAGNOSIS — I50.33 ACUTE ON CHRONIC HEART FAILURE WITH PRESERVED EJECTION FRACTION (H): ICD-10-CM

## 2023-11-21 DIAGNOSIS — J44.9 CHRONIC OBSTRUCTIVE PULMONARY DISEASE, UNSPECIFIED COPD TYPE (H): ICD-10-CM

## 2023-11-21 DIAGNOSIS — I10 ESSENTIAL HYPERTENSION: ICD-10-CM

## 2023-11-21 DIAGNOSIS — I82.4Z9 DEEP VEIN THROMBOSIS (DVT) OF DISTAL VEIN OF LOWER EXTREMITY, UNSPECIFIED CHRONICITY, UNSPECIFIED LATERALITY (H): ICD-10-CM

## 2023-11-21 DIAGNOSIS — N40.0 BENIGN PROSTATIC HYPERPLASIA WITHOUT LOWER URINARY TRACT SYMPTOMS: ICD-10-CM

## 2023-11-21 DIAGNOSIS — I48.0 PAROXYSMAL A-FIB (H): ICD-10-CM

## 2023-11-21 DIAGNOSIS — F41.9 ANXIETY: Primary | ICD-10-CM

## 2023-11-21 PROCEDURE — 99207 PR NO CHARGE LOS: CPT | Mod: VID | Performed by: PHARMACIST

## 2023-11-21 NOTE — Clinical Note
Kassi I saw this very pleasant patient of yours today, he would like to follow up with you in 3 months.

## 2023-11-21 NOTE — PATIENT INSTRUCTIONS
"Recommendations from today's MTM visit:                                                       Check home blood pressures after sitting comfortably for 5 minutes or more, maybe twice a week, just to see what your readings are on the lower dose of furosemide and to see if we need to back off further.  If you continue to have nighttime urination, it may be a good idea to discuss this with Dr. Heath or urology to see if they think it would be worth it to add another medication or not (unfortunately some of this is just to be expected with regular diuretic use).  If you continue to have congestion prior to starting pulmonary rehab and do want to try the nasal spray,  fluticasone/Flonase nasal spray and use per package instructions.     Follow-up: 3 months    It was great speaking with you today.  I value your experience and would be very thankful for your time in providing feedback in our clinic survey. In the next few days, you may receive an email or text message from [email or web address] with a link to a survey related to your  clinical pharmacist.\"     To schedule another MTM appointment, please call the clinic directly or you may call the MTM scheduling line at [phone] or toll-free at [phone].     My Clinical Pharmacist's contact information:                                                      Please feel free to contact me with any questions or concerns you have.      Kassi Alatorre PharmD, [facility]  Medication Therapy Management Provider  Phone: [phone]  [email or web address]    "

## 2023-11-21 NOTE — PROGRESS NOTES
Medication Therapy Management (MTM) Encounter    ASSESSMENT:                            Medication Adherence/Access: No issues identified    Anxiety/Depression:   Stable per patient.     Hypertension/A. Fib/HFpEF/H/o PE/DVT/CAD:  Fairly stable per patient report, BP at goal <140/90 without concerning side effects. Home blood pressure monitoring was advised as he's on the low end of normal and also as he's had a recent dose adjustment.    COPD:   Fluticasone may be helpful but we discussed ideally patient manages best he can until pulmonary rehab, which likely will be more beneficial.     BPH: Patient may benefit from follow up with PCP or urology, though we did discuss that nighttime urination may be unavoidable with long-term diuretic use.    PLAN:                            Check home blood pressures after sitting comfortably for 5 minutes or more, maybe twice a week, just to see what your readings are on the lower dose of furosemide and to see if we need to back off further.  If you continue to have nighttime urination, it may be a good idea to discuss this with Dr. Heath or urology to see if they think it would be worth it to add another medication or not (unfortunately some of this is just to be expected with regular diuretic use).  If you continue to have congestion prior to starting pulmonary rehab and do want to try the nasal spray,  fluticasone/Flonase nasal spray and use per package instructions.     Follow-up: 3 months    SUBJECTIVE/OBJECTIVE:                          Jaun Craven is a 83 year old male contacted via secure video for a follow-up visit from 4/13 with Kassi Glez PharmD.       Reason for visit: med recheck.    Allergies/ADRs: Reviewed in chart  Past Medical History: Reviewed in chart  Tobacco: He reports that he quit smoking about 34 years ago. His smoking use included cigarettes. He started smoking about 59 years ago. He has a 12.50 pack-year smoking history. He has never used  smokeless tobacco.  Alcohol: none    Medication Adherence/Access: no issues reported    Anxiety/Depression:   Venlafaxine 75 mg twice daily    Feels this is going ok- not sure how much value the venlafaxine has, still feeling anxious but is learning to go with it. No side effects he's noted.     Hypertension/A. Fib/HFpEF/H/o PE/DVT/CAD:  Patient has history of coronary artery disease with stenting of the proximal left anterior descending artery and also stenting of the proximal to mid right coronary artery in 2014.  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      Aspirin 81mg daily  Xarelto 20mg daily  diltiazem ER 300mg daily  furosemide 40 mg AM and 20mg in the afternoon  (with KCl 10mEq twice daily)  Imdur 30mg daily  lisinopril 40mg daily  metolazone 2.5mg twice daily  spironolactone 25mg every other day    Has moved metolazone and furosemide to afternoon and this has not helped much with nocturia- he is not having worsening swelling with the slight drop in furosemide dose. Has noted that his bruising has been better since switching warfarin to Xarelto. Has some wheezing but not concerning SOB at this time.  Last EF 2/2/23 - 60-65%  Has a home cuff but has not been checking home readings.     BP Readings from Last 3 Encounters:   11/17/23 105/62   11/06/23 126/63   10/05/23 114/70     Potassium   Date Value Ref Range Status   11/06/2023 3.9 3.4 - 5.3 mmol/L Final   10/18/2022 3.5 3.4 - 5.3 mmol/L Final   06/21/2021 4.1 3.4 - 5.3 mmol/L Final     COPD:   Was in the ED 11/6 for COPD exacerbation  Albuterol MDI as needed (generally once daily in the afternoon)  Trelegy Ellipta 200mcg daily    He is stuffy and congested- has been trying to handle this with Mucinex until he can start pulmonary rehab next month. Has not picked up the flonase yet advised by Dr. Heath.  Patient is not experiencing side effects.   Patient reports the following symptoms: none.  Patient does have an COPD Action Plan on file.   Has  spirometry been completed: Yes      BPH: Patient is taking tamsulosin 0.4 mg daily.  He reports urinary symptoms are worsened, as noted above.  No side effects reported.    I spent 20 minutes with this patient today. All changes were made via collaborative practice agreement with Anne Heath MD. A copy of the visit note was provided to the patient's provider(s).    A summary of these recommendations was sent via [facility].    Kassi Alatorre, PharmD, BCACP  Medication Therapy Management Provider  Phone: [phone]  [email or web address]    Telemedicine Visit Details  Type of service:  Telephone visit  Start Time:  8:33 AM  End Time: 8:53 AM     Medication Therapy Recommendations  No medication therapy recommendations to display

## 2023-11-22 ENCOUNTER — PATIENT OUTREACH (OUTPATIENT)
Dept: CARE COORDINATION | Facility: CLINIC | Age: 83
End: 2023-11-22
Payer: MEDICARE

## 2023-11-27 ENCOUNTER — ANCILLARY PROCEDURE (OUTPATIENT)
Dept: CARDIOLOGY | Facility: CLINIC | Age: 83
End: 2023-11-27
Attending: INTERNAL MEDICINE
Payer: MEDICARE

## 2023-11-27 DIAGNOSIS — Z95.0 CARDIAC PACEMAKER IN SITU: ICD-10-CM

## 2023-11-27 DIAGNOSIS — I49.5 SICK SINUS SYNDROME (H): ICD-10-CM

## 2023-11-27 PROCEDURE — 93280 PM DEVICE PROGR EVAL DUAL: CPT | Performed by: INTERNAL MEDICINE

## 2023-12-01 LAB
MDC_IDC_LEAD_CONNECTION_STATUS: NORMAL
MDC_IDC_LEAD_CONNECTION_STATUS: NORMAL
MDC_IDC_LEAD_IMPLANT_DT: NORMAL
MDC_IDC_LEAD_IMPLANT_DT: NORMAL
MDC_IDC_LEAD_LOCATION: NORMAL
MDC_IDC_LEAD_LOCATION: NORMAL
MDC_IDC_LEAD_LOCATION_DETAIL_1: NORMAL
MDC_IDC_LEAD_LOCATION_DETAIL_1: NORMAL
MDC_IDC_LEAD_MFG: NORMAL
MDC_IDC_LEAD_MFG: NORMAL
MDC_IDC_LEAD_MODEL: NORMAL
MDC_IDC_LEAD_MODEL: NORMAL
MDC_IDC_LEAD_POLARITY_TYPE: NORMAL
MDC_IDC_LEAD_POLARITY_TYPE: NORMAL
MDC_IDC_LEAD_SERIAL: NORMAL
MDC_IDC_LEAD_SERIAL: NORMAL
MDC_IDC_MSMT_BATTERY_DTM: NORMAL
MDC_IDC_MSMT_BATTERY_REMAINING_LONGEVITY: 18 MO
MDC_IDC_MSMT_BATTERY_REMAINING_PERCENTAGE: 36 %
MDC_IDC_MSMT_BATTERY_STATUS: NORMAL
MDC_IDC_MSMT_LEADCHNL_RA_IMPEDANCE_VALUE: 429 OHM
MDC_IDC_MSMT_LEADCHNL_RA_PACING_THRESHOLD_AMPLITUDE: 0.4 V
MDC_IDC_MSMT_LEADCHNL_RA_PACING_THRESHOLD_PULSEWIDTH: 0.4 MS
MDC_IDC_MSMT_LEADCHNL_RV_IMPEDANCE_VALUE: 393 OHM
MDC_IDC_MSMT_LEADCHNL_RV_PACING_THRESHOLD_AMPLITUDE: 1.7 V
MDC_IDC_MSMT_LEADCHNL_RV_PACING_THRESHOLD_PULSEWIDTH: 0.4 MS
MDC_IDC_PG_IMPLANT_DTM: NORMAL
MDC_IDC_PG_MFG: NORMAL
MDC_IDC_PG_MODEL: NORMAL
MDC_IDC_PG_SERIAL: NORMAL
MDC_IDC_PG_TYPE: NORMAL
MDC_IDC_SESS_CLINIC_NAME: NORMAL
MDC_IDC_SESS_DTM: NORMAL
MDC_IDC_SESS_TYPE: NORMAL
MDC_IDC_SET_BRADY_AT_MODE_SWITCH_MODE: NORMAL
MDC_IDC_SET_BRADY_AT_MODE_SWITCH_RATE: 150 {BEATS}/MIN
MDC_IDC_SET_BRADY_LOWRATE: 60 {BEATS}/MIN
MDC_IDC_SET_BRADY_MAX_SENSOR_RATE: 130 {BEATS}/MIN
MDC_IDC_SET_BRADY_MAX_TRACKING_RATE: 130 {BEATS}/MIN
MDC_IDC_SET_BRADY_MODE: NORMAL
MDC_IDC_SET_BRADY_PAV_DELAY_HIGH: 220 MS
MDC_IDC_SET_BRADY_PAV_DELAY_LOW: 250 MS
MDC_IDC_SET_BRADY_SAV_DELAY_HIGH: 220 MS
MDC_IDC_SET_BRADY_SAV_DELAY_LOW: 250 MS
MDC_IDC_SET_LEADCHNL_RA_PACING_AMPLITUDE: 2 V
MDC_IDC_SET_LEADCHNL_RA_PACING_POLARITY: NORMAL
MDC_IDC_SET_LEADCHNL_RA_PACING_PULSEWIDTH: 0.4 MS
MDC_IDC_SET_LEADCHNL_RA_SENSING_ADAPTATION_MODE: NORMAL
MDC_IDC_SET_LEADCHNL_RA_SENSING_POLARITY: NORMAL
MDC_IDC_SET_LEADCHNL_RA_SENSING_SENSITIVITY: 0.5 MV
MDC_IDC_SET_LEADCHNL_RV_PACING_AMPLITUDE: 3.2 V
MDC_IDC_SET_LEADCHNL_RV_PACING_CAPTURE_MODE: NORMAL
MDC_IDC_SET_LEADCHNL_RV_PACING_POLARITY: NORMAL
MDC_IDC_SET_LEADCHNL_RV_PACING_PULSEWIDTH: 0.4 MS
MDC_IDC_SET_LEADCHNL_RV_SENSING_ADAPTATION_MODE: NORMAL
MDC_IDC_SET_LEADCHNL_RV_SENSING_POLARITY: NORMAL
MDC_IDC_SET_LEADCHNL_RV_SENSING_SENSITIVITY: 1 MV
MDC_IDC_SET_ZONE_DETECTION_INTERVAL: 375 MS
MDC_IDC_SET_ZONE_STATUS: NORMAL
MDC_IDC_SET_ZONE_TYPE: NORMAL
MDC_IDC_SET_ZONE_VENDOR_TYPE: NORMAL
MDC_IDC_STAT_AT_BURDEN_PERCENT: 1 %
MDC_IDC_STAT_AT_DTM_END: NORMAL
MDC_IDC_STAT_AT_DTM_START: NORMAL
MDC_IDC_STAT_BRADY_DTM_END: NORMAL
MDC_IDC_STAT_BRADY_DTM_START: NORMAL
MDC_IDC_STAT_BRADY_RA_PERCENT_PACED: 9 %
MDC_IDC_STAT_BRADY_RV_PERCENT_PACED: 0 %
MDC_IDC_STAT_EPISODE_RECENT_COUNT: 0
MDC_IDC_STAT_EPISODE_RECENT_COUNT: 25
MDC_IDC_STAT_EPISODE_RECENT_COUNT_DTM_END: NORMAL
MDC_IDC_STAT_EPISODE_RECENT_COUNT_DTM_END: NORMAL
MDC_IDC_STAT_EPISODE_RECENT_COUNT_DTM_START: NORMAL
MDC_IDC_STAT_EPISODE_RECENT_COUNT_DTM_START: NORMAL
MDC_IDC_STAT_EPISODE_TYPE: NORMAL
MDC_IDC_STAT_EPISODE_TYPE: NORMAL
MDC_IDC_STAT_EPISODE_VENDOR_TYPE: NORMAL
MDC_IDC_STAT_EPISODE_VENDOR_TYPE: NORMAL

## 2023-12-04 DIAGNOSIS — I10 PRIMARY HYPERTENSION: ICD-10-CM

## 2023-12-06 ENCOUNTER — PATIENT OUTREACH (OUTPATIENT)
Dept: CARE COORDINATION | Facility: CLINIC | Age: 83
End: 2023-12-06
Payer: MEDICARE

## 2023-12-11 ENCOUNTER — MYC MEDICAL ADVICE (OUTPATIENT)
Dept: FAMILY MEDICINE | Facility: CLINIC | Age: 83
End: 2023-12-11

## 2023-12-12 ASSESSMENT — ENCOUNTER SYMPTOMS
ARTHRALGIAS: 1
JOINT SWELLING: 1
PALPITATIONS: 0
HEADACHES: 1
HEMATURIA: 0
NERVOUS/ANXIOUS: 1
COUGH: 1
CONSTIPATION: 0
HEARTBURN: 1
NAUSEA: 0
SORE THROAT: 0
DYSURIA: 0
EYE PAIN: 0
FREQUENCY: 1
DIZZINESS: 1
HEMATOCHEZIA: 0
DIARRHEA: 0
WEAKNESS: 1
CHILLS: 1
SHORTNESS OF BREATH: 1
PARESTHESIAS: 0
MYALGIAS: 1
FEVER: 0
ABDOMINAL PAIN: 0

## 2023-12-12 ASSESSMENT — ACTIVITIES OF DAILY LIVING (ADL): CURRENT_FUNCTION: NO ASSISTANCE NEEDED

## 2023-12-13 ENCOUNTER — THERAPY VISIT (OUTPATIENT)
Dept: PHYSICAL THERAPY | Facility: CLINIC | Age: 83
End: 2023-12-13
Attending: INTERNAL MEDICINE
Payer: MEDICARE

## 2023-12-13 DIAGNOSIS — R26.89 BALANCE PROBLEM: Primary | ICD-10-CM

## 2023-12-13 DIAGNOSIS — R26.89 IMPAIRMENT OF BALANCE: ICD-10-CM

## 2023-12-13 PROCEDURE — 97110 THERAPEUTIC EXERCISES: CPT | Mod: GP

## 2023-12-13 PROCEDURE — 97161 PT EVAL LOW COMPLEX 20 MIN: CPT | Mod: GP

## 2023-12-13 NOTE — PROGRESS NOTES
PHYSICAL THERAPY EVALUATION  Type of Visit: Evaluation    See electronic medical record for Abuse and Falls Screening details.    Subjective   Pt referred to OP PT for balance impairments going on multiple years. Pt has relevant PMH: COPD, B shoulder replacements, R knee replacement. Pt wears compression garments on B LE which helps prevent edema. Pt has previously worked with lymphedema therapy. Pt does have OA in L knee, although denies regular pain. Pt has a walker and cane at home but primarily uses the cane. When inside does not use the cane at all. Denies any falls in the last year. Wants to be able to walk dog more comfortably. Has orthostasis, denies changes in vision, reports some N/T in toes and fingers.       Presenting condition or subjective complaint: improve health  Date of onset: 11/20/23 (order dtae)    Relevant medical history: COPD   Past Medical History:   Diagnosis Date    (HFpEF) heart failure with preserved ejection fraction (H)     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 Started in 2012    Hypothyroidism     Lymphedema     Mumps     Obesity, Class III, BMI 40-49.9 (morbid obesity) (H)     RIN (obstructive sleep apnea)     has refused CPAP    Pacemaker     Paroxysmal A-fib (H)     PE (pulmonary thromboembolism) (H)      Dates & types of surgery: 2 shoulder replacements July 82 - Feb. 83   Past Surgical History:   Procedure Laterality Date    cardiac stenting      CV RIGHT HEART CATH MEASUREMENTS  RECORDED N/A 11/5/2019    Procedure: Right Heart Cath;  Surgeon: Roberto Hernandez MD;  Location:  HEART CARDIAC CATH LAB    ENT SURGERY      tonsillectomy    IMPLANT PACEMAKER      INJECT NERVE BLOCK SUPRASCAPULAR Bilateral 8/26/2021    Procedure: BLOCK, NERVE, SUPRASCAPULAR, Bilateral, under ultrasound guidance;  Surgeon: Swetha Allen MD;  Location: [facility] OR    INJECT NERVE BLOCK SUPRASCAPULAR Bilateral 9/23/2021    Procedure: bilateral suprascapular nerve block;  Surgeon: Swetha Allen MD;  Location: [facility] OR    INJECT NERVE BLOCK SUPRASCAPULAR Left 9/15/2022    Procedure: BLOCK, NERVE, SUPRASCAPULAR- left;  Surgeon: Swetha Allen MD;  Location: [facility] OR    INJECT NERVE BLOCK SUPRASCAPULAR Left 10/20/2022    Procedure: BLOCK, NERVE, SUPRASCAPULAR- left;  Surgeon: Swetha Allen MD;  Location: [facility] OR    INJECT NERVE BLOCK SUPRASCAPULAR Left 1/5/2023    Procedure: BLOCK, NERVE, SUPRASCAPULAR (left);  Surgeon: Swetha Allen MD;  Location: [facility] OR    NERVE BLOCK PERIPHERAL Bilateral 3/10/2022    Procedure: bilateral suprascapular nerve block;  Surgeon: Swetha Allen MD;  Location: [facility] OR    ORTHOPEDIC SURGERY Right 2012    knee replacement    REVERSE ARTHROPLASTY SHOULDER Left 2/21/2023    Procedure: ARTHROPLASTY, SHOULDER, TOTAL, REVERSE LEFT;  Surgeon: Katelyn Vasquez MD;  Location: [facility] OR       Prior diagnostic imaging/testing results:       Prior therapy history for the same diagnosis, illness or injury: No      Prior Level of Function  Transfers: Independent  Ambulation: Assistive equipment, cane or walker  ADL: Independent  IADL:     Living Environment  Social support: Alone   Type of home: Apartment/condo   Stairs to enter the home: No       Ramp: No   Stairs inside the home: No       Help at home: None  Equipment owned: Straight Cane     Employment: No    Hobbies/Interests: writing, reading and walking with the dog    Patient goals for therapy:      Pain assessment: Pain present  L knee  occasional pain     Objective   Cognitive Status Examination  Orientation: Oriented to person, place and time   Level of Consciousness: Alert  Follows Commands and Answers Questions: 100% of the time  Personal Safety and Judgement: Intact  Memory: Intact    OBSERVATION:   POSTURE: Standing Posture: Rounded shoulders, Forward head   PALPATION:    RANGE OF MOTION:  not formally assessed  STRENGTH:  5/5 hip flex, quads (except L quad 3+/5 d/t pain in knee), B DF 4/5, B hip abd 4/5, B ham 5/5    BED MOBILITY: WFL    TRANSFERS:  unable to perform w/o UE assist, can perform from 20 inch height    GAIT:   Gait Description: Pt ambulates with slightly reduced L stance time, WBOS, reduced B heelstrike    BALANCE:     SPECIAL TESTS  Functional Gait Assessment (FGA)    (<22/30 indicates fall risk)   10 Meter Walk Test (Comfortable)  11.63 sec = 0.52 m/s   6 Minute Walk Test (6MWT)  Deferred d/t pt participating in pulmonary rehab       Wilkins Balance Scale (BBS)    (<45/56 indicates fall risk)   5 Times Sit-to-Stand (5TSTS)  0, 3 STS from elevated height in 17.04 sec     Dynamic Gait Index (DGI) Total Score (out of 24): 16   (<19/24 indicates fall risk)   Timed Up and Go (TUG) - sec    Single Leg Stance Right (sec)    Single Leg Stance Left (sec)    Modified CTSIB Conditions (sec) Cond 1: 30 sec  Cond 2: 30 sec - mod sway  Cond 4: 30 sec - mod sway  Cond 5 : 2.94 sec   Romberg  (sec)    Sharpened Romberg (sec)            SENSATION:  reports some tingling in fingers and toes    REFLEXES:   COORDINATION:   MUSCLE TONE:      Assessment & Plan   CLINICAL IMPRESSIONS  Medical Diagnosis: Balance problems    Treatment Diagnosis: Imapirment of balance   Impression/Assessment: Patient is a 83 year old male with balance complaints.  The following significant findings have been identified: Pain, Decreased strength, Impaired balance, and Impaired sensation. These impairments interfere with their ability to perform self care tasks,  recreational activities, household chores, household mobility, and community mobility as compared to previous level of function.     Clinical Decision Making (Complexity):  Clinical Presentation: Stable/Uncomplicated  Clinical Presentation Rationale: based on medical and personal factors listed in PT evaluation  Clinical Decision Making (Complexity): Low complexity    PLAN OF CARE  Treatment Interventions:  Modalities: E-stim  Interventions: Gait Training, Manual Therapy, Neuromuscular Re-education, Therapeutic Activity, Therapeutic Exercise    Long Term Goals     PT Goal 1  Goal Identifier: mCTSIB  Goal Description: Pt will be able to maintain balance in all conditons of the mCTSIB for at least 15 sec in order to reduce risk of falls in low light and compliant surface conditions (ie. showering)  Goal Progress: eval: 2.94 sec in condition 5  Target Date: 03/11/24  PT Goal 2  Goal Identifier: 5xSTS  Goal Description: Pt will perform 5xSTS from 20 inch height in 20 sec or less in order to improve safety and independence with transfers at home  Goal Progress: eval: 3 STS from elevated height in 17.04 sec  Target Date: 03/11/24  PT Goal 3  Goal Identifier: DGI  Goal Description: Pt will score at least 20/24 on the DGI in order to indicate reduced risk of falls when ambulating in the community  Goal Progress: eval: 16/24  Target Date: 03/11/24  PT Goal 4  Goal Identifier: HEP  Goal Description: Pt will be independent with HEP at least 3x/week in order to improve self management of impairments  Target Date: 03/11/24      Frequency of Treatment: 1x/week  Duration of Treatment: 90 days    Recommended Referrals to Other Professionals:   Education Assessment:   Learner/Method: Patient    Risks and benefits of evaluation/treatment have been explained.   Patient/Family/caregiver agrees with Plan of Care.     Evaluation Time:     PT Eval, Low Complexity Minutes (62574): 20       Signing Clinician: Kennedi Ball, PT      [facility]  [facility]                                                                                   OUTPATIENT PHYSICAL THERAPY      PLAN OF TREATMENT FOR OUTPATIENT REHABILITATION   Patient's Last Name, First Name, Erich Monet YOB: 1940   Provider's Name   [facility]   Medical Record No.  [id number]     Onset Date: 11/20/23 (order dtae)  Start of Care Date: 12/13/23     Medical Diagnosis:  Balance problems      PT Treatment Diagnosis:  Imapirment of balance Plan of Treatment  Frequency/Duration: 1x/week/ 90 days    Certification date from 12/13/23 to 03/11/24         See note for plan of treatment details and functional goals     Kennedi Ball, PT                         I CERTIFY THE NEED FOR THESE SERVICES FURNISHED UNDER        THIS PLAN OF TREATMENT AND WHILE UNDER MY CARE     (Physician attestation of this document indicates review and certification of the therapy plan).              Referring Provider:  Anne Heath    Initial Assessment  See Epic Evaluation- Start of Care Date: 12/13/23

## 2023-12-15 ENCOUNTER — ANCILLARY PROCEDURE (OUTPATIENT)
Dept: GENERAL RADIOLOGY | Facility: CLINIC | Age: 83
End: 2023-12-15
Attending: INTERNAL MEDICINE
Payer: MEDICARE

## 2023-12-15 ENCOUNTER — LAB (OUTPATIENT)
Dept: LAB | Facility: CLINIC | Age: 83
End: 2023-12-15
Payer: MEDICARE

## 2023-12-15 ENCOUNTER — MYC MEDICAL ADVICE (OUTPATIENT)
Dept: FAMILY MEDICINE | Facility: CLINIC | Age: 83
End: 2023-12-15

## 2023-12-15 ENCOUNTER — OFFICE VISIT (OUTPATIENT)
Dept: FAMILY MEDICINE | Facility: CLINIC | Age: 83
End: 2023-12-15
Payer: MEDICARE

## 2023-12-15 VITALS
HEART RATE: 78 BPM | RESPIRATION RATE: 18 BRPM | WEIGHT: 265 LBS | HEIGHT: 72 IN | TEMPERATURE: 98 F | SYSTOLIC BLOOD PRESSURE: 116 MMHG | OXYGEN SATURATION: 92 % | BODY MASS INDEX: 35.89 KG/M2 | DIASTOLIC BLOOD PRESSURE: 70 MMHG

## 2023-12-15 DIAGNOSIS — R06.02 SHORTNESS OF BREATH: ICD-10-CM

## 2023-12-15 DIAGNOSIS — I25.119 CORONARY ARTERY DISEASE INVOLVING NATIVE CORONARY ARTERY WITH ANGINA PECTORIS, UNSPECIFIED WHETHER NATIVE OR TRANSPLANTED HEART (H): ICD-10-CM

## 2023-12-15 DIAGNOSIS — Z00.00 ROUTINE HISTORY AND PHYSICAL EXAMINATION OF ADULT: ICD-10-CM

## 2023-12-15 DIAGNOSIS — N18.2 TYPE 2 DIABETES MELLITUS WITH STAGE 2 CHRONIC KIDNEY DISEASE, WITHOUT LONG-TERM CURRENT USE OF INSULIN (H): ICD-10-CM

## 2023-12-15 DIAGNOSIS — I50.32 CHRONIC HEART FAILURE WITH PRESERVED EJECTION FRACTION (H): ICD-10-CM

## 2023-12-15 DIAGNOSIS — R09.89 CHEST CONGESTION: ICD-10-CM

## 2023-12-15 DIAGNOSIS — E11.22 TYPE 2 DIABETES MELLITUS WITH STAGE 2 CHRONIC KIDNEY DISEASE, WITHOUT LONG-TERM CURRENT USE OF INSULIN (H): ICD-10-CM

## 2023-12-15 DIAGNOSIS — I10 ESSENTIAL HYPERTENSION: ICD-10-CM

## 2023-12-15 DIAGNOSIS — R06.2 WHEEZE: ICD-10-CM

## 2023-12-15 DIAGNOSIS — I10 PRIMARY HYPERTENSION: ICD-10-CM

## 2023-12-15 DIAGNOSIS — G47.33 OSA (OBSTRUCTIVE SLEEP APNEA): ICD-10-CM

## 2023-12-15 DIAGNOSIS — N18.2 CHRONIC RENAL DISEASE, STAGE II: ICD-10-CM

## 2023-12-15 DIAGNOSIS — E78.49 OTHER HYPERLIPIDEMIA: ICD-10-CM

## 2023-12-15 DIAGNOSIS — Z00.00 ENCOUNTER FOR MEDICARE ANNUAL WELLNESS EXAM: Primary | ICD-10-CM

## 2023-12-15 DIAGNOSIS — J42 CHRONIC BRONCHITIS, UNSPECIFIED CHRONIC BRONCHITIS TYPE (H): ICD-10-CM

## 2023-12-15 DIAGNOSIS — Z95.0 CARDIAC PACEMAKER IN SITU: ICD-10-CM

## 2023-12-15 DIAGNOSIS — R06.2 WHEEZE: Primary | ICD-10-CM

## 2023-12-15 DIAGNOSIS — I89.0 LYMPHEDEMA: ICD-10-CM

## 2023-12-15 DIAGNOSIS — R93.89 ABNORMAL CXR: ICD-10-CM

## 2023-12-15 DIAGNOSIS — E03.9 HYPOTHYROIDISM, UNSPECIFIED TYPE: ICD-10-CM

## 2023-12-15 DIAGNOSIS — R09.02 HYPOXEMIA: ICD-10-CM

## 2023-12-15 DIAGNOSIS — N18.31 STAGE 3A CHRONIC KIDNEY DISEASE (H): ICD-10-CM

## 2023-12-15 LAB
ALBUMIN MFR UR ELPH: <6 MG/DL
ALBUMIN SERPL BCG-MCNC: 4.2 G/DL (ref 3.5–5.2)
ANION GAP SERPL CALCULATED.3IONS-SCNC: 11 MMOL/L (ref 7–15)
BASOPHILS # BLD AUTO: 0 10E3/UL (ref 0–0.2)
BASOPHILS NFR BLD AUTO: 0 %
BUN SERPL-MCNC: 28.1 MG/DL (ref 8–23)
CALCIUM SERPL-MCNC: 10.3 MG/DL (ref 8.8–10.2)
CHLORIDE SERPL-SCNC: 98 MMOL/L (ref 98–107)
CHOLEST SERPL-MCNC: 135 MG/DL
CREAT SERPL-MCNC: 1.25 MG/DL (ref 0.67–1.17)
CREAT UR-MCNC: 34 MG/DL
CREAT UR-MCNC: 34 MG/DL
CRP SERPL-MCNC: <3 MG/L
DEPRECATED HCO3 PLAS-SCNC: 34 MMOL/L (ref 22–29)
EGFRCR SERPLBLD CKD-EPI 2021: 57 ML/MIN/1.73M2
EOSINOPHIL # BLD AUTO: 0.2 10E3/UL (ref 0–0.7)
EOSINOPHIL NFR BLD AUTO: 2 %
ERYTHROCYTE [DISTWIDTH] IN BLOOD BY AUTOMATED COUNT: 15.2 % (ref 10–15)
GLUCOSE SERPL-MCNC: 108 MG/DL (ref 70–99)
HBA1C MFR BLD: 5.8 % (ref 0–5.6)
HCT VFR BLD AUTO: 39.4 % (ref 40–53)
HDLC SERPL-MCNC: 47 MG/DL
HGB BLD-MCNC: 12.5 G/DL (ref 13.3–17.7)
HGB BLD-MCNC: 12.6 G/DL (ref 13.3–17.7)
IMM GRANULOCYTES # BLD: 0.1 10E3/UL
IMM GRANULOCYTES NFR BLD: 1 %
IRON BINDING CAPACITY (ROCHE): 311 UG/DL (ref 240–430)
IRON SATN MFR SERPL: 32 % (ref 15–46)
IRON SERPL-MCNC: 98 UG/DL (ref 61–157)
LDLC SERPL CALC-MCNC: 57 MG/DL
LYMPHOCYTES # BLD AUTO: 2 10E3/UL (ref 0.8–5.3)
LYMPHOCYTES NFR BLD AUTO: 20 %
MCH RBC QN AUTO: 31.8 PG (ref 26.5–33)
MCHC RBC AUTO-ENTMCNC: 32 G/DL (ref 31.5–36.5)
MCV RBC AUTO: 100 FL (ref 78–100)
MICROALBUMIN UR-MCNC: <12 MG/L
MICROALBUMIN/CREAT UR: NORMAL MG/G{CREAT}
MONOCYTES # BLD AUTO: 1 10E3/UL (ref 0–1.3)
MONOCYTES NFR BLD AUTO: 10 %
NEUTROPHILS # BLD AUTO: 6.6 10E3/UL (ref 1.6–8.3)
NEUTROPHILS NFR BLD AUTO: 68 %
NONHDLC SERPL-MCNC: 88 MG/DL
NT-PROBNP SERPL-MCNC: 91 PG/ML (ref 0–1800)
PHOSPHATE SERPL-MCNC: 3.2 MG/DL (ref 2.5–4.5)
PLATELET # BLD AUTO: 213 10E3/UL (ref 150–450)
POTASSIUM SERPL-SCNC: 4.2 MMOL/L (ref 3.4–5.3)
PROT/CREAT 24H UR: NORMAL MG/G{CREAT}
PTH-INTACT SERPL-MCNC: 64 PG/ML (ref 15–65)
RBC # BLD AUTO: 3.96 10E6/UL (ref 4.4–5.9)
SODIUM SERPL-SCNC: 143 MMOL/L (ref 135–145)
TRIGL SERPL-MCNC: 156 MG/DL
TSH SERPL DL<=0.005 MIU/L-ACNC: 1.12 UIU/ML (ref 0.3–4.2)
VIT D+METAB SERPL-MCNC: 48 NG/ML (ref 20–50)
WBC # BLD AUTO: 9.8 10E3/UL (ref 4–11)

## 2023-12-15 PROCEDURE — 80061 LIPID PANEL: CPT | Performed by: INTERNAL MEDICINE

## 2023-12-15 PROCEDURE — 99214 OFFICE O/P EST MOD 30 MIN: CPT | Mod: 25 | Performed by: INTERNAL MEDICINE

## 2023-12-15 PROCEDURE — 83036 HEMOGLOBIN GLYCOSYLATED A1C: CPT | Performed by: INTERNAL MEDICINE

## 2023-12-15 PROCEDURE — 86140 C-REACTIVE PROTEIN: CPT | Performed by: INTERNAL MEDICINE

## 2023-12-15 PROCEDURE — 83970 ASSAY OF PARATHORMONE: CPT

## 2023-12-15 PROCEDURE — 83540 ASSAY OF IRON: CPT

## 2023-12-15 PROCEDURE — 85025 COMPLETE CBC W/AUTO DIFF WBC: CPT | Performed by: INTERNAL MEDICINE

## 2023-12-15 PROCEDURE — 71046 X-RAY EXAM CHEST 2 VIEWS: CPT | Mod: TC | Performed by: STUDENT IN AN ORGANIZED HEALTH CARE EDUCATION/TRAINING PROGRAM

## 2023-12-15 PROCEDURE — G0439 PPPS, SUBSEQ VISIT: HCPCS | Performed by: INTERNAL MEDICINE

## 2023-12-15 PROCEDURE — 82043 UR ALBUMIN QUANTITATIVE: CPT | Performed by: INTERNAL MEDICINE

## 2023-12-15 PROCEDURE — 80069 RENAL FUNCTION PANEL: CPT

## 2023-12-15 PROCEDURE — 84443 ASSAY THYROID STIM HORMONE: CPT

## 2023-12-15 PROCEDURE — 83880 ASSAY OF NATRIURETIC PEPTIDE: CPT | Performed by: INTERNAL MEDICINE

## 2023-12-15 PROCEDURE — 82570 ASSAY OF URINE CREATININE: CPT | Performed by: INTERNAL MEDICINE

## 2023-12-15 PROCEDURE — 82306 VITAMIN D 25 HYDROXY: CPT

## 2023-12-15 PROCEDURE — 83550 IRON BINDING TEST: CPT

## 2023-12-15 PROCEDURE — 36415 COLL VENOUS BLD VENIPUNCTURE: CPT

## 2023-12-15 PROCEDURE — 84156 ASSAY OF PROTEIN URINE: CPT

## 2023-12-15 RX ORDER — AZITHROMYCIN 250 MG/1
TABLET, FILM COATED ORAL
Qty: 6 TABLET | Refills: 0 | Status: SHIPPED | OUTPATIENT
Start: 2023-12-15 | End: 2023-12-20

## 2023-12-15 ASSESSMENT — ENCOUNTER SYMPTOMS
CONSTIPATION: 0
ARTHRALGIAS: 1
DIARRHEA: 0
WEAKNESS: 1
DYSURIA: 0
JOINT SWELLING: 1
ABDOMINAL PAIN: 0
EYE PAIN: 0
HEADACHES: 1
CHILLS: 1
MYALGIAS: 1
FREQUENCY: 1
SORE THROAT: 0
HEMATURIA: 0
HEARTBURN: 1
NERVOUS/ANXIOUS: 1
PALPITATIONS: 0
DIZZINESS: 1
COUGH: 1
FEVER: 0
HEMATOCHEZIA: 0
PARESTHESIAS: 0
SHORTNESS OF BREATH: 1
NAUSEA: 0

## 2023-12-15 ASSESSMENT — ACTIVITIES OF DAILY LIVING (ADL): CURRENT_FUNCTION: NO ASSISTANCE NEEDED

## 2023-12-15 ASSESSMENT — PAIN SCALES - GENERAL: PAINLEVEL: NO PAIN (0)

## 2023-12-15 NOTE — PATIENT INSTRUCTIONS
Patient Education   Personalized Prevention Plan  You are due for the preventive services outlined below.  Your care team is available to assist you in scheduling these services.  If you have already completed any of these items, please share that information with your care team to update in your medical record.  Health Maintenance Due   Topic Date Due    Heart Failure Action Plan  Never done    URINE DRUG SCREEN  09/20/2022    ANNUAL REVIEW OF HM ORDERS  08/01/2023    Eye Exam  12/05/2023     Your Health Risk Assessment indicates you feel you are not in good health    A healthy lifestyle helps keep the body fit and the mind alert. It helps protect you from disease, helps you fight disease, and helps prevent chronic disease (disease that doesn't go away) from getting worse. This is important as you get older and begin to notice twinges in muscles and joints and a decline in the strength and stamina you once took for granted. A healthy lifestyle includes good healthcare, good nutrition, weight control, recreation, and regular exercise. Avoid harmful substances and do what you can to keep safe. Another part of a healthy lifestyle is stay mentally active and socially involved.    Good healthcare   Have a wellness visit every year.   If you have new symptoms, let us know right away. Don't wait until the next checkup.   Take medicines exactly as prescribed and keep your medicines in a safe place. Tell us if your medicine causes problems.   Healthy diet and weight control   Eat 3 or 4 small, nutritious, low-fat, high-fiber meals a day. Include a variety of fruits, vegetables, and whole-grain foods.   Make sure you get enough calcium in your diet. Calcium, vitamin D, and exercise help prevent osteoporosis (bone thinning).   If you live alone, try eating with others when you can. That way you get a good meal and have company while you eat it.   Try to keep a healthy weight. If you eat more calories than your body uses for  energy, it will be stored as fat and you will gain weight.     Recreation   Recreation is not limited to sports and team events. It includes any activity that provides relaxation, interest, enjoyment, and exercise. Recreation provides an outlet for physical, mental, and social energy. It can give a sense of worth and achievement. It can help you stay healthy.    Mental Exercise and Social Involvement  Mental and emotional health is as important as physical health. Keep in touch with friends and family. Stay as active as possible. Continue to learn and challenge yourself.   Things you can do to stay mentally active are:  Learn something new, like a foreign language or musical instrument.   Play SCRABBLE or do crossword puzzles. If you cannot find people to play these games with you at home, you can play them with others on your computer through the Internet.   Join a games club--anything from card games to chess or checkers or lawn bowling.   Start a new hobby.   Go back to school.   Volunteer.   Read.   Keep up with world events.  Hearing Loss: Care Instructions  Overview     Hearing loss is a sudden or slow decrease in how well you hear. It can range from slight to profound. Permanent hearing loss can occur with aging. It also can happen when you are exposed long-term to loud noise. Examples include listening to loud music, riding motorcycles, or being around other loud machines.  Hearing loss can affect your work and home life. It can make you feel lonely or depressed. You may feel that you have lost your independence. But hearing aids and other devices can help you hear better and feel connected to others.  Follow-up care is a key part of your treatment and safety. Be sure to make and go to all appointments, and call your doctor if you are having problems. It's also a good idea to know your test results and keep a list of the medicines you take.  How can you care for yourself at home?  Avoid loud noises whenever  possible. This helps keep your hearing from getting worse.  Always wear hearing protection around loud noises.  Wear a hearing aid as directed.  A professional can help you pick a hearing aid that will work best for you.  You can also get hearing aids over the counter for mild to moderate hearing loss.  Have hearing tests as your doctor suggests. They can show whether your hearing has changed. Your hearing aid may need to be adjusted.  Use other devices as needed. These may include:  Telephone amplifiers and hearing aids that can connect to a television, stereo, radio, or microphone.  Devices that use lights or vibrations. These alert you to the doorbell, a ringing telephone, or a baby monitor.  Television closed-captioning. This shows the words at the bottom of the screen. Most new TVs can do this.  TTY (text telephone). This lets you type messages back and forth on the telephone instead of talking or listening. These devices are also called TDD. When messages are typed on the keyboard, they are sent over the phone line to a receiving TTY. The message is shown on a monitor.  Use text messaging, social media, and email if it is hard for you to communicate by telephone.  Try to learn a listening technique called speechreading. It is not lipreading. You pay attention to people's gestures, expressions, posture, and tone of voice. These clues can help you understand what a person is saying. Face the person you are talking to, and have them face you. Make sure the lighting is good. You need to see the other person's face clearly.  Think about counseling if you need help to adjust to your hearing loss.  When should you call for help?  Watch closely for changes in your health, and be sure to contact your doctor if:    You think your hearing is getting worse.     You have new symptoms, such as dizziness or nausea.   Where can you learn more?  Go to https://www.healthwise.net/patiented  Enter R798 in the search box to learn  "more about \"Hearing Loss: Care Instructions.\"  Current as of: February 28, 2023               Content Version: 13.8    7647-5514 [employer].   Care instructions adapted under license by your healthcare professional. If you have questions about a medical condition or this instruction, always ask your healthcare professional. [employer] disclaims any warranty or liability for your use of this information.      Bladder Training: Care Instructions  Your Care Instructions     Bladder training is used to treat urge incontinence and stress incontinence. Urge incontinence means that the need to urinate comes on so fast that you can't get to a toilet in time. Stress incontinence means that you leak urine because of pressure on your bladder. For example, it may happen when you laugh, cough, or lift something heavy.  Bladder training can increase how long you can wait before you have to urinate. It can also help your bladder hold more urine. And it can give you better control over the urge to urinate.  It is important to remember that bladder training takes a few weeks to a few months to make a difference. You may not see results right away, but don't give up.  Follow-up care is a key part of your treatment and safety. Be sure to make and go to all appointments, and call your doctor if you are having problems. It's also a good idea to know your test results and keep a list of the medicines you take.  How can you care for yourself at home?  Work with your doctor to come up with a bladder training program that is right for you. You may use one or more of the following methods.  Delayed urination  In the beginning, try to keep from urinating for 5 minutes after you first feel the need to go.  While you wait, take deep, slow breaths to relax. Kegel exercises can also help you delay the need to go to the bathroom.  After some practice, when you can easily wait 5 minutes to urinate, try to wait 10 minutes " "before you urinate.  Slowly increase the waiting period until you are able to control when you have to urinate.  Scheduled urination  Empty your bladder when you first wake up in the morning.  Schedule times throughout the day when you will urinate.  Start by going to the bathroom every hour, even if you don't need to go.  Slowly increase the time between trips to the bathroom.  When you have found a schedule that works well for you, keep doing it.  If you wake up during the night and have to urinate, do it. Apply your schedule to waking hours only.  Kegel exercises  These tighten and strengthen pelvic muscles, which can help you control the flow of urine. (If doing these exercises causes pain, stop doing them and talk with your doctor.) To do Kegel exercises:  Squeeze your muscles as if you were trying not to pass gas. Or squeeze your muscles as if you were stopping the flow of urine. Your belly, legs, and buttocks shouldn't move.  Hold the squeeze for 3 seconds, then relax for 5 to 10 seconds.  Start with 3 seconds, then add 1 second each week until you are able to squeeze for 10 seconds.  Repeat the exercise 10 times a session. Do 3 to 8 sessions a day.  When should you call for help?  Watch closely for changes in your health, and be sure to contact your doctor if:    Your incontinence is getting worse.     You do not get better as expected.   Where can you learn more?  Go to https://[email or web address]/patiented  Enter V684 in the search box to learn more about \"Bladder Training: Care Instructions.\"  Current as of: February 28, 2023               Content Version: 13.8    9326-6572 [employer].   Care instructions adapted under license by your healthcare professional. If you have questions about a medical condition or this instruction, always ask your healthcare professional. [employer] disclaims any warranty or liability for your use of this information.      Your Health Risk Assessment " indicates you feel you are not in good emotional health.    Recreation   Recreation is not limited to sports and team events. It includes any activity that provides relaxation, interest, enjoyment, and exercise. Recreation provides an outlet for physical, mental, and social energy. It can give a sense of worth and achievement. It can help you stay healthy.    Mental Exercise and Social Involvement  Mental and emotional health is as important as physical health. Keep in touch with friends and family. Stay as active as possible. Continue to learn and challenge yourself.   Things you can do to stay mentally active are:  Learn something new, like a foreign language or musical instrument.   Play SCRABBLE or do crossword puzzles. If you cannot find people to play these games with you at home, you can play them with others on your computer through the Internet.   Join a games club--anything from card games to chess or checkers or lawn bowling.   Start a new hobby.   Go back to school.   Volunteer.   Read.   Keep up with world events.

## 2023-12-15 NOTE — PROGRESS NOTES
"SUBJECTIVE:   Jaun is a 83 year old, presenting for the following:  Physical, Cold Symptoms (Congestion, wet cough, 2 months , no diarrhea, no sore throat, ), Health Maintenance (Eye exam: [facility], appt in January 2024), and Numbness (Finger not on feet, due for diabetic foot exam )        12/15/2023     8:15 AM   Additional Questions   Roomed by BENJY       Are you in the first 12 months of your Medicare coverage?  No    Healthy Habits:     In general, how would you rate your overall health?  Fair    Frequency of exercise:  6-7 days/week    Do you usually eat at least 4 servings of fruit and vegetables a day, include whole grains    & fiber and avoid regularly eating high fat or \"junk\" foods?  Yes    Taking medications regularly:  Yes    Ability to successfully perform activities of daily living:  No assistance needed    Home Safety:  No safety concerns identified    Hearing Impairment:  Difficulty following a conversation in a noisy restaurant or crowded room, need to ask people to speak up or repeat themselves, find that men's voices are easier to understand than woman's and difficulty understanding soft or whispered speech    In the past 6 months, have you been bothered by leaking of urine? Yes    In general, how would you rate your overall mental or emotional health?  Fair    Additional concerns today:  Yes      Presenting for yearly wellness.  He does work 7 days a week he is concerned with some congestion still in his chest.  He does get swelling of the ankles up to the knees at times.  He has a wraps on.  His memory is acceptable.  GI symptoms no diarrhea no blood in the stools.    He has good voiding pattern.  No chest pain, no dizziness.  Little bit foggy.        Wheezing heart rate    Concern with the congestion in the chest.  Has nocturia every 2 hours.  Not much polyuria during the day.    Is on Trelegy daily has not needed to use the albuterol as much.      Pulse ox ranging 92%.  Had PE " in his lungs in .  Paroxysmal A-fib on chronic anticoagulation.    Is doing wraps to his legs, stopped seeing lymphedema clinic.  Has Velcro wraps ordered.    Is on potassium supplements.      He follows with a new lung doctor.    His diabetes is well-controlled he is prediabetic now.  Is not on medication,    Blood pressure very well-controlled.    Occasional use of MiraLAX.    He is on Lasix 40 mg twice daily.  And takes more metolazone half hour prior.  Undergoing PT for balance therapy.  He will be also starting pulmonary rehab.  Patient seen pulmonary specialist.    Have you ever done Advance Care Planning? (For example, a Health Directive, POLST, or a discussion with a medical provider or your loved ones about your wishes): No, advance care planning information given to patient to review.  Patient plans to discuss their wishes with loved ones or provider.         Fall risk  Fallen 2 or more times in the past year?: No  Any fall with injury in the past year?: No    Cognitive Screening   1) Repeat 3 items (Leader, Season, Table)    2) Clock draw: NORMAL  3) 3 item recall: Recalls 2 objects   Results: NORMAL clock, 1-2 items recalled: COGNITIVE IMPAIRMENT LESS LIKELY    Mini-CogTM Copyright S Jese. Licensed by the author for use in [facility]; reprinted with permission (troy@.[facility]). All rights reserved.      Do you have sleep apnea, excessive snoring or daytime drowsiness? : yes    Reviewed and updated as needed this visit by clinical staff   Tobacco  Allergies  Meds  Problems             Reviewed and updated as needed this visit by Provider      Problems            Social History     Tobacco Use    Smoking status: Former     Packs/day: 0.50     Years: 30.00     Additional pack years: 0.00     Total pack years: 15.00     Types: Cigarettes     Start date: 6/15/1959     Quit date: 1989     Years since quittin.5    Smokeless tobacco: Never    Tobacco comments:     smoked from   to 1989   Substance Use Topics    Alcohol use: 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; recovering             12/12/2023     2:48 PM   Alcohol Use   Prescreen: >3 drinks/day or >7 drinks/week? No          No data to display              Do you have a current opioid prescription? No  Do you use any other controlled substances or medications that are not prescribed by a provider? None            Current providers sharing in care for this patient include:   Patient Care Team:  Anne Heath MD as PCP - General (Internal Medicine)  Kassi Glez, PharmD as Pharmacist (Pharmacist)  Sebastian Holland MD as MD (Urology)  Tiffany Suero NP as Assigned Nephrology Provider  Anne Heath MD as Assigned PCP  Swetha Allen MD as MD (Anesthesiology)  Kassi Glez, PharmD as Assigned MTM Pharmacist  Katelyn Vasquez MD as Assigned Musculoskeletal Provider  Monty Pardo MD as Assigned Heart and Vascular Provider  Kassi Alatorre [facility] as Pharmacist (Pharmacist)    The following health maintenance items are reviewed in Epic and correct as of today:  Health Maintenance   Topic Date Due    HF ACTION PLAN  Never done    URINE DRUG SCREEN  09/20/2022    ANNUAL REVIEW OF HM ORDERS  08/01/2023    EYE EXAM  12/05/2023    A1C  03/15/2024    BMP  06/15/2024    ALT  11/06/2024    MEDICARE ANNUAL WELLNESS VISIT  12/15/2024    LIPID  12/15/2024    MICROALBUMIN  12/15/2024    DIABETIC FOOT EXAM  12/15/2024    FALL RISK ASSESSMENT  12/15/2024    CBC  12/15/2024    HEMOGLOBIN  12/15/2024    ADVANCE CARE PLANNING  12/15/2028    DTAP/TDAP/TD IMMUNIZATION (4 - Td or Tdap) 04/19/2033    TSH W/FREE T4 REFLEX  Completed    SPIROMETRY  Completed    COPD ACTION PLAN  Completed    PHQ-2 (once per calendar year)  Completed    INFLUENZA VACCINE  Completed    Pneumococcal Vaccine: 65+ Years  Completed    URINALYSIS  Completed    RSV VACCINE (Pregnancy & 60+)  Completed    COVID-19 Vaccine  Completed    IPV  IMMUNIZATION  Aged Out    HPV IMMUNIZATION  Aged Out    MENINGITIS IMMUNIZATION  Aged Out    RSV MONOCLONAL ANTIBODY  Aged Out    ZOSTER IMMUNIZATION  Discontinued     Lab work is in process  Labs reviewed in EPIC  BP Readings from Last 3 Encounters:   12/15/23 116/70   11/17/23 105/62   11/06/23 126/63    Wt Readings from Last 3 Encounters:   12/15/23 120.2 kg (265 lb)   11/17/23 120.2 kg (265 lb)   10/05/23 121 kg (266 lb 12.8 oz)                  Patient Active Problem List   Diagnosis    Morbid obesity (H)    PE (pulmonary thromboembolism) (H)    HTN (hypertension)    Chronic diastolic heart failure (H)    Dyspnea    Fatigue    RIN (obstructive sleep apnea)    COPD (chronic obstructive pulmonary disease) (H)    Hypothyroidism    Paroxysmal A-fib (H)    HLD (hyperlipidemia)    Lymphedema    (HFpEF) heart failure with preserved ejection fraction (H)    Essential hypertension    Cardiac pacemaker in situ    DVT (deep venous thrombosis) (H)    Coronary artery disease involving native coronary artery with angina pectoris, unspecified whether native or transplanted heart (H24)    Obesity, Class III, BMI 40-49.9 (morbid obesity) (H)    Muscle tension headache    Heart failure with preserved ejection fraction, unspecified HF chronicity (H)    Status post coronary angiogram    Anxiety    Deep vein thrombosis (DVT) of distal vein of lower extremity, unspecified chronicity, unspecified laterality (H)    Type 2 diabetes mellitus with stage 2 chronic kidney disease, without long-term current use of insulin (H)    Chronic kidney disease, stage 3 (H)    Primary osteoarthritis of right shoulder    Chronic right shoulder pain    Long term current use of anticoagulants with INR goal of 2.0-3.0    Chronic left shoulder pain    Ankle injury, right, initial encounter    Hypokalemia    Alcohol dependence in remission (H)     Past Surgical History:   Procedure Laterality Date    cardiac stenting      COLONOSCOPY  2010    CV RIGHT  HEART CATH MEASUREMENTS RECORDED N/A 2019    Procedure: Right Heart Cath;  Surgeon: Roberto Hernandez MD;  Location:  HEART CARDIAC CATH LAB    ENT SURGERY      tonsillectomy    EYE SURGERY  2012    IMPLANT PACEMAKER      INJECT NERVE BLOCK SUPRASCAPULAR Bilateral 2021    Procedure: BLOCK, NERVE, SUPRASCAPULAR, Bilateral, under ultrasound guidance;  Surgeon: Swetha Allen MD;  Location: [facility] OR    INJECT NERVE BLOCK SUPRASCAPULAR Bilateral 2021    Procedure: bilateral suprascapular nerve block;  Surgeon: Swetha Allen MD;  Location: [facility] OR    INJECT NERVE BLOCK SUPRASCAPULAR Left 09/15/2022    Procedure: BLOCK, NERVE, SUPRASCAPULAR- left;  Surgeon: Swetha Allen MD;  Location: [facility] OR    INJECT NERVE BLOCK SUPRASCAPULAR Left 10/20/2022    Procedure: BLOCK, NERVE, SUPRASCAPULAR- left;  Surgeon: Swetha Allen MD;  Location: [facility] OR    INJECT NERVE BLOCK SUPRASCAPULAR Left 2023    Procedure: BLOCK, NERVE, SUPRASCAPULAR (left);  Surgeon: Swetha Allen MD;  Location: [facility] OR    NERVE BLOCK PERIPHERAL Bilateral 03/10/2022    Procedure: bilateral suprascapular nerve block;  Surgeon: Swetha Allen MD;  Location: [facility] OR    ORTHOPEDIC SURGERY Right 2012    knee replacement    REVERSE ARTHROPLASTY SHOULDER Left 2023    Procedure: ARTHROPLASTY, SHOULDER, TOTAL, REVERSE LEFT;  Surgeon: Katelyn Vasquez MD;  Location: [facility]    VASCULAR SURGERY         Social History     Tobacco Use    Smoking status: Former     Packs/day: 0.50     Years: 30.00     Additional pack years: 0.00     Total pack years: 15.00     Types: Cigarettes     Start date: 6/15/1959     Quit date: 1989     Years since quittin.5    Smokeless tobacco: Never    Tobacco comments:     smoked from  to    Substance Use Topics    Alcohol use: No     Comment: quit in ; recovering     Family History   Problem Relation Age of Onset    Brain Cancer Mother     Other Cancer Mother     Anxiety  Disorder Mother     Cerebrovascular Disease Father     Hypertension Father     Obesity Father     Substance Abuse Son     Osteoporosis Sister     Thyroid Disease Sister     Anesthesia Reaction No family hx of     Thrombosis No family hx of          Current Outpatient Medications   Medication Sig Dispense Refill    acetaminophen (TYLENOL) 500 MG tablet Take 1,000 mg by mouth 3 times daily      Acetylcarnitine HCl (ACETYL L-CARNITINE PO) Take 1 capsule by mouth daily      albuterol (PROAIR HFA/PROVENTIL HFA/VENTOLIN HFA) 108 (90 Base) MCG/ACT inhaler Inhale 2 puffs into the lungs 4 times daily as needed for shortness of breath or wheezing 18 g 3    aspirin (ASA) 81 MG chewable tablet Take 1 tablet by mouth daily      atorvastatin (LIPITOR) 80 MG tablet Take 1 tablet (80 mg) by mouth every evening 90 tablet 3    diltiazem ER COATED BEADS (CARDIZEM CD/CARTIA XT) 300 MG 24 hr capsule Take 1 capsule (300 mg) by mouth daily 90 capsule 3    ferrous sulfate (SLO-FE) 142 (45 Fe) MG CR tablet Take 1 tablet (142 mg) by mouth every other day 45 tablet 3    Fluticasone-Umeclidin-Vilanterol (TRELEGY ELLIPTA) 200-62.5-25 MCG/INH oral inhaler Inhale 1 puff into the lungs daily      furosemide (LASIX) 40 MG tablet Take 1 tablet (40 mg) by mouth 2 times daily 180 tablet 1    isosorbide mononitrate (IMDUR) 30 MG 24 hr tablet Take 1 tablet (30 mg) by mouth daily 90 tablet 3    levothyroxine (SYNTHROID/LEVOTHROID) 175 MCG tablet Take 1 tablet (175 mcg) by mouth daily 90 tablet 0    lisinopril (ZESTRIL) 40 MG tablet Take 1 tablet (40 mg) by mouth daily 90 tablet 3    metolazone (ZAROXOLYN) 2.5 MG tablet TAKE 1 TABLET(2.5 MG) BY MOUTH TWICE DAILY 180 tablet 2    Multiple vitamin TABS Take 1 tablet by mouth daily      polyethylene glycol (MIRALAX) 17 g packet Take 17 g by mouth daily 7 packet 0    potassium chloride ER (KLOR-CON M) 10 MEQ CR tablet Take 1 tablet (10 mEq) by mouth 2 times daily 180 tablet 1    spironolactone (ALDACTONE) 25  MG tablet TAKE 1 TABLET(25 MG) BY MOUTH EVERY OTHER DAY 45 tablet 3    tamsulosin (FLOMAX) 0.4 MG capsule TAKE 1 CAPSULE(0.4 MG) BY MOUTH DAILY 90 capsule 2    TAURINE PO Take 1 capsule by mouth daily      venlafaxine (EFFEXOR) 75 MG tablet TAKE 1 TABLET(75 MG) BY MOUTH TWICE DAILY 180 tablet 3    XARELTO ANTICOAGULANT 20 MG TABS tablet TAKE 1 TABLET(20 MG) BY MOUTH DAILY WITH DINNER 30 tablet 11    albuterol (PROAIR HFA/PROVENTIL HFA/VENTOLIN HFA) 108 (90 Base) MCG/ACT inhaler Inhale 2 puffs into the lungs every 6 hours as needed for shortness of breath, wheezing or cough 18 g 0    azithromycin (ZITHROMAX) 250 MG tablet Take 2 tablets (500 mg) by mouth daily for 1 day, THEN 1 tablet (250 mg) daily for 4 days. 6 tablet 0    predniSONE (DELTASONE) 20 MG tablet Take two tablets (= 40mg) each day for 5 (five) days 10 tablet 0     Allergies   Allergen Reactions    No Known Allergies      Recent Labs   Lab Test 12/15/23  0822 11/06/23  1013 10/04/23  1358 07/26/23  0804 06/29/23  0838 06/19/23  1646 05/01/23  0826 04/19/23  0906 08/31/21  1401 06/21/21  1454 06/14/21  1455   A1C 5.8*  --   --  5.6  --   --   --  5.6   < > 6.1*  --    LDL 57  --   --   --  67  --  101* 111*   < >  --   --    HDL 47  --   --   --  48  --  53 52   < >  --   --    TRIG 156*  --   --   --  74  --  82 107   < >  --   --    ALT  --  18 14  --   --  17 13 14   < >  --   --    CR 1.25* 1.26* 1.50* 1.29*  --  1.05 1.10 1.10   < > 1.57* 1.81*   GFRESTIMATED 57* 56* 46* 55*  --  70 67 67   < > 40* 34*   GFRESTBLACK  --   --   --   --   --   --   --   --   --  47* 39*   POTASSIUM 4.2 3.9 4.6 4.0  --  3.8 3.9 3.2*   < > 4.1 4.7   TSH 1.12  --  0.94  --   --   --   --  5.04*   < >  --  0.71    < > = values in this interval not displayed.              Review of Systems   Constitutional:  Positive for chills. Negative for fever.   HENT:  Positive for congestion and hearing loss. Negative for ear pain and sore throat.    Eyes:  Negative for pain and  visual disturbance.   Respiratory:  Positive for cough and shortness of breath.    Cardiovascular:  Positive for peripheral edema. Negative for chest pain and palpitations.   Gastrointestinal:  Positive for heartburn. Negative for abdominal pain, constipation, diarrhea, hematochezia and nausea.   Genitourinary:  Positive for frequency, impotence and urgency. Negative for dysuria, genital sores, hematuria and penile discharge.   Musculoskeletal:  Positive for arthralgias, joint swelling and myalgias.   Skin:  Negative for rash.   Neurological:  Positive for dizziness, weakness and headaches. Negative for paresthesias.   Psychiatric/Behavioral:  The patient is nervous/anxious.      Constitutional, HEENT, cardiovascular, pulmonary, GI, , musculoskeletal, neuro, skin, endocrine and psych systems are negative, except as otherwise noted.    OBJECTIVE:   /70 (BP Location: Left arm, Patient Position: Sitting, Cuff Size: Adult Large)   Pulse 78   Temp 98  F (36.7  C) (Oral)   Resp 18   Ht 1.829 m (6')   Wt 120.2 kg (265 lb)   SpO2 92%   BMI 35.94 kg/m   Estimated body mass index is 35.94 kg/m  as calculated from the following:    Height as of this encounter: 1.829 m (6').    Weight as of this encounter: 120.2 kg (265 lb).  Physical Exam  GENERAL: healthy, alert and no distress  EYES: Eyes grossly normal to inspection, PERRL and conjunctivae and sclerae normal  HENT: ear canals and TM's normal, nose and mouth without ulcers or lesions  NECK: no adenopathy, no asymmetry, masses, or scars and thyroid normal to palpation  RESP: lungs clear to auscultation - no rales, rhonchi.  Bilateral diffuse wheezes.  No use of accessory muscles.  CV: regular rate and rhythm, normal S1 S2, no S3 or S4, no murmur, click or rub, no peripheral edema and peripheral pulses strong  ABDOMEN: soft, nontender, no hepatosplenomegaly, no masses and bowel sounds normal  MS: no gross musculoskeletal defects noted, bilateral pedal  edema.  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  Seborrheic keratosis-like lesions a big patch on his back and there is a elevated dark lesion on the distal left forearm slightly asymmetric probable SK like lesion.  Has multiple ecchymosis bruise on the extensor surface of both arms.  NEURO: Normal strength and tone, mentation intact and speech normal  PSYCH: mentation appears normal, affect normal/bright  Diabetic foot exam done onychomycosis with mycosis of the right big toe swelling at ankles bilateral intact pulses dorsalis pedis posterior tibials both legs.  No musculoskeletal deformities no skin ulcers or lesions otherwise.  Diagnostic Test Results:  Labs reviewed in Epic    ASSESSMENT / PLAN:   Jaun was seen today for physical, cold symptoms, health maintenance and numbness.    Diagnoses and all orders for this visit:    Encounter for Medicare annual wellness exam    Primary hypertension  -     Lipid panel reflex to direct LDL Fasting  -     Albumin Random Urine Quantitative with Creat Ratio    Type 2 diabetes mellitus with stage 2 chronic kidney disease, without long-term current use of insulin (H)  -     Hemoglobin A1c    Wheeze  -     XR Chest 2 Views; Future    Chest congestion  Comments:  No chest pain.  Some chronic wheeze.  Orders:  -     XR Chest 2 Views; Future  -     CBC with platelets and differential  -     CRP, inflammation    Hypoxemia  Comments:  Pulse ox 90 to 93% on room air.  No signs of accessory muscle breathing or increased work of breathing  Orders:  -     XR Chest 2 Views; Future  -     CBC with platelets and differential  -     CRP, inflammation    Chronic heart failure with preserved ejection fraction (H)  -     BNP-N terminal pro    Routine history and physical examination of adult    Coronary artery disease involving native coronary artery with angina pectoris, unspecified whether native or transplanted heart (H24)  Comments:  No anginal  symptoms.    Chronic bronchitis, unspecified chronic bronchitis type (H)    RIN (obstructive sleep apnea)  Comments:  Uses CPAP daily.  Follows with sleep medicine./Pulmonary    Shortness of breath  Comments:  Mild/minimal symptoms    Other hyperlipidemia    Cardiac pacemaker in situ    Essential hypertension  Comments:  Blood pressure well-controlled.    Stage 3a chronic kidney disease (H)    Lymphedema  Comments:  Has Velcro wraps and , stockings in place    Other orders  -     PRIMARY CARE FOLLOW-UP SCHEDULING; Future      Patient following up with cardiology January 31 will be getting a repeat echo  Sees pulmonary specialist will be undergoing pulm rehab in the next couple weeks.  Continue with PT for balance.  Has some mild diffuse wheeze on auscultation pulse ox 92% on room air we will check  chest x-ray.  He is on adequate diuresis.  Check proBNP, CBC and CRP.  No change of medications for this time.  Further recommendation pending lab and chest x-ray results.  He is up-to-date on his vaccine except he needs a second dose for Shingrix.  Last fit test was negative.  Schedule diabetic eye exam..  Follows with dermatology.  Patient has been advised of split billing requirements and indicates understanding: Yes      COUNSELING:  Reviewed preventive health counseling, as reflected in patient instructions       Regular exercise       Healthy diet/nutrition       Vision screening       Hearing screening       Dental care       Bladder control       Fall risk prevention       Aspirin prophylaxis        Colon cancer screening       Prostate cancer screening        He reports that he quit smoking about 34 years ago. His smoking use included cigarettes. He started smoking about 64 years ago. He has a 15.00 pack-year smoking history. He has never used smokeless tobacco.      Appropriate preventive services were discussed with this patient, including applicable screening as appropriate for fall prevention, nutrition,  physical activity, Tobacco-use cessation, weight loss and cognition.  Checklist reviewing preventive services available has been given to the patient.    Reviewed patients plan of care and provided an AVS. The Basic Care Plan (routine screening as documented in Health Maintenance) for Erich meets the Care Plan requirement. This Care Plan has been established and reviewed with the Patient.          Anne Heath MD  [facility]    Identified Health Risks:  I have reviewed Opioid Use Disorder and Substance Use Disorder risk factors and made any needed referrals.   The patient was provided with suggestions to help him develop a healthy physical lifestyle.  The patient was provided with written information regarding signs of hearing loss.  Information on urinary incontinence and treatment options given to patient.  The patient was provided with suggestions to help him develop a healthy emotional lifestyle.

## 2023-12-18 ENCOUNTER — OFFICE VISIT (OUTPATIENT)
Dept: NEPHROLOGY | Facility: CLINIC | Age: 83
End: 2023-12-18
Payer: MEDICARE

## 2023-12-18 VITALS
OXYGEN SATURATION: 94 % | TEMPERATURE: 98 F | BODY MASS INDEX: 36.08 KG/M2 | DIASTOLIC BLOOD PRESSURE: 66 MMHG | SYSTOLIC BLOOD PRESSURE: 104 MMHG | WEIGHT: 266 LBS | RESPIRATION RATE: 20 BRPM | HEART RATE: 71 BPM

## 2023-12-18 DIAGNOSIS — R06.2 WHEEZE: ICD-10-CM

## 2023-12-18 DIAGNOSIS — J44.1 COPD EXACERBATION (H): ICD-10-CM

## 2023-12-18 DIAGNOSIS — Z87.09 HISTORY OF COPD: ICD-10-CM

## 2023-12-18 DIAGNOSIS — N17.9 ACUTE KIDNEY INJURY (H): Primary | ICD-10-CM

## 2023-12-18 DIAGNOSIS — I10 PRIMARY HYPERTENSION: ICD-10-CM

## 2023-12-18 DIAGNOSIS — J42 CHRONIC BRONCHITIS, UNSPECIFIED CHRONIC BRONCHITIS TYPE (H): Primary | ICD-10-CM

## 2023-12-18 DIAGNOSIS — R06.2 WHEEZE: Primary | ICD-10-CM

## 2023-12-18 DIAGNOSIS — J42 CHRONIC BRONCHITIS, UNSPECIFIED CHRONIC BRONCHITIS TYPE (H): ICD-10-CM

## 2023-12-18 PROCEDURE — G0463 HOSPITAL OUTPT CLINIC VISIT: HCPCS

## 2023-12-18 PROCEDURE — 99214 OFFICE O/P EST MOD 30 MIN: CPT

## 2023-12-18 RX ORDER — PREDNISONE 20 MG/1
TABLET ORAL
Qty: 10 TABLET | Refills: 0 | Status: SHIPPED | OUTPATIENT
Start: 2023-12-18 | End: 2024-02-02

## 2023-12-18 RX ORDER — PREDNISONE 20 MG/1
20 TABLET ORAL 2 TIMES DAILY
COMMUNITY
Start: 2023-12-18 | End: 2024-02-02

## 2023-12-18 RX ORDER — PREDNISONE 20 MG/1
20 TABLET ORAL 2 TIMES DAILY
Qty: 10 TABLET | Refills: 0 | Status: SHIPPED | OUTPATIENT
Start: 2023-12-18 | End: 2023-12-18

## 2023-12-18 RX ORDER — LISINOPRIL 40 MG/1
20 TABLET ORAL DAILY
COMMUNITY
Start: 2023-12-18 | End: 2024-04-01

## 2023-12-18 ASSESSMENT — PAIN SCALES - GENERAL: PAINLEVEL: NO PAIN (0)

## 2023-12-18 NOTE — NURSING NOTE
Chief Complaint   Patient presents with    RECHECK     Follow up     /69 (BP Location: Left arm, Patient Position: Sitting, Cuff Size: Adult Large)   Pulse 71   Temp 98  F (36.7  C) (Oral)   Resp 20   Wt 120.7 kg (266 lb)   SpO2 94%   BMI 36.08 kg/m    Matt Dobbs CMA on 12/18/2023 at 12:11 PM

## 2023-12-18 NOTE — LETTER
12/18/2023       RE: Erich Craven  [address]  [facility] [address]     Dear Colleague,    Thank you for referring your patient, Erich Craven, to the [facility]PHROLOG[facility] [place] at [facility]. Please see a copy of my visit note below.    Nephrology Clinic Visit 12/18/23    Assessment and Plan:    1. 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 At our last visit and through about 4/23 was on 10 mg every day. At some point after that, the dose went to 40 mg every day and his creat liz to a peak of 1.5 and now around 1.2    Blood pressures are soft    Will decrease Lisinopril to 20 mg every day ( from 40 mg every day) . We have done this previously with improvement in his renal function without sacrificing his b/p control.     Baseline creat had been running mid to upper 1's since 8/20 w/recurrent DONA    - Etiology of his CKD likely HTN/DONA   - Renal US normal   - Does not use NSAIDs   - Blood pressures on the soft side   - DM well controlled with A1c of 5.8 % ( 12/23)   - He is on statin for CV risk reduction    2. CV/HTN/Pafib, Diastolic HF/PPM - Currently well controlled w/o edema. Jaun has gained considerable weight since our last visit. Home b/ps teens/. Clinic b/ps 104-115/69-71. Weight 266 # from 248 #, 262#. Was 292# in 6/21. Has not brought in his b/p device to be checked for accuracy. Albumin 4.2. Jaun notes that his Lasix was held when he was in TCU after his shoulder surgery in 2/23 and he developed significant edema. Has been trying to catch up since  Echo 2/23 showing EF 60%, IVC not dilated  He is on triple diuretic therapy.   Follows with Dr Jumana Dixon ( last seen 2/13/23) No changes recommended, but was not on high dose Lisinopril at that time    Current regimen:    Isosorbide 30 mg  qd   Lasix 40 mg bid   Spironolactone 25 mg every other day   Lisinopril 40 mg every day   Diltiazem  mg every day   Metolazone 2.5 mg bid    - Decrease Lisinopril to 20 mg every day. He does not need this high dose from a B/P, Cardiac or Renal standpoint ( Has normal EF and no albuminuria). B/p goal would be < 130/80 given his age.     - Continue home monitoring. Will have nursing call him next week for update    - Repeat renal panel 1/31/23    3. Electrolytes - No acute concerns. Na 143, K 4.2   - On KCL 10 meq bid    4. Acid base - Bicarb 34 today. I suspect he is on the dry side given his diuretic therapy and higher creat in the setting of high dose ACE.     5. BMD - Ca 10.3 Phos 3.2 albumin 4.2   Vit D 48, PTH 64 ( 12/23)   No need for Ca/Vit D. I suspect his calcium is elevated due to over diuresis.     6. BPH - Controlled with Flomax    7. Anemia - Hgb 12.6   - Iron studies 12/23: Ferritin  Fe 98 IS 32   - On iron tab every other day.    - Does not meet LEIGH criteria     8. Disposition - RTC 3/18/23 for follow up w/labs prior    Assessment and plan was discussed with patient and he voiced his understanding and agreement.    Reason for Visit:  CKD2/HTN    HPI:  Mr Craven is an 84 yo male with CKD2, COPD, RIN on BiPAP, Diastolic HF, SSS s/p PPM, CAD, P-Afib, HTN, BPH, h/o Pulmonary embolism, present today for CKD follow up. Last seen in clinic by me on 1/3/23  Baseline creat has been < 1.1 since 7/22    ROS:   No complaints  Recovered  from successful left shoulder replacement 2/23  Did not qualify for the [employer] RIN program, so remains on his CPAP  Somewhere along the way his Lisinopril was increased to 40 mg every day, but patient unsure when and I can't find correlating documentation  Has gained considerable amt of weight since our last visit  Home blood teens/   Currently being treated for a respiratory process. He also notes cough  No CP  No abdominal concerns  Voiding w/o complication. On  Flomax  Appetite is improving. Has been a vegetarian for years   Walking with his dog regularly    Chronic Health Problems:    CKD2  COPD  RIN on CPAP  Diastolic HF  SSS s/p PPM  CAD  P-Afib  h/o Pulmonary embolism  HTN  Hypothyroidism  Anxiety  BPH  HLD    Family Hx:   Family History   Problem Relation Age of Onset     Brain Cancer Mother      Other Cancer Mother      Anxiety Disorder Mother      Cerebrovascular Disease Father      Hypertension Father      Obesity Father      Substance Abuse Son      Osteoporosis Sister      Thyroid Disease Sister      Anesthesia Reaction No family hx of      Thrombosis No family hx of      Personal Hx:   , lives with his dog Clayton. Lives in [facility] apt connected to the [facility] ( [facility]) . Has services and meals. He continues to set up his own medications. NS    Allergies:  Allergies   Allergen Reactions     No Known Allergies        Medications:  Current Outpatient Medications   Medication Sig     lisinopril (ZESTRIL) 40 MG tablet Take 0.5 tablets (20 mg) by mouth daily     predniSONE (DELTASONE) 20 MG tablet Take 1 tablet (20 mg) by mouth 2 times daily     acetaminophen (TYLENOL) 500 MG tablet Take 1,000 mg by mouth 3 times daily     Acetylcarnitine HCl (ACETYL L-CARNITINE PO) Take 1 capsule by mouth daily     albuterol (PROAIR HFA/PROVENTIL HFA/VENTOLIN HFA) 108 (90 Base) MCG/ACT inhaler Inhale 2 puffs into the lungs 4 times daily as needed for shortness of breath or wheezing     aspirin (ASA) 81 MG chewable tablet Take 1 tablet by mouth daily     atorvastatin (LIPITOR) 80 MG tablet Take 1 tablet (80 mg) by mouth every evening     azithromycin (ZITHROMAX) 250 MG tablet Take 2 tablets (500 mg) by mouth daily for 1 day, THEN 1 tablet (250 mg) daily for 4 days.     diltiazem ER COATED BEADS (CARDIZEM CD/CARTIA XT) 300 MG 24 hr capsule Take 1 capsule (300 mg) by mouth daily     ferrous sulfate (SLO-FE) 142 (45 Fe) MG CR tablet Take 1 tablet (142 mg) by  mouth every other day     Fluticasone-Umeclidin-Vilanterol (TRELEGY ELLIPTA) 200-62.5-25 MCG/INH oral inhaler Inhale 1 puff into the lungs daily     furosemide (LASIX) 40 MG tablet Take 1 tablet (40 mg) by mouth 2 times daily     isosorbide mononitrate (IMDUR) 30 MG 24 hr tablet Take 1 tablet (30 mg) by mouth daily     levothyroxine (SYNTHROID/LEVOTHROID) 175 MCG tablet Take 1 tablet (175 mcg) by mouth daily     metolazone (ZAROXOLYN) 2.5 MG tablet TAKE 1 TABLET(2.5 MG) BY MOUTH TWICE DAILY     Multiple vitamin TABS Take 1 tablet by mouth daily     polyethylene glycol (MIRALAX) 17 g packet Take 17 g by mouth daily     potassium chloride ER (KLOR-CON M) 10 MEQ CR tablet Take 1 tablet (10 mEq) by mouth 2 times daily     predniSONE (DELTASONE) 20 MG tablet Take 2 tablets on day 1 and 2, 1-1/2 tablet on day 3 and 4, 1 tablet on days 5 and 6, half tablet on day 7 and 8 and stop     spironolactone (ALDACTONE) 25 MG tablet TAKE 1 TABLET(25 MG) BY MOUTH EVERY OTHER DAY     tamsulosin (FLOMAX) 0.4 MG capsule TAKE 1 CAPSULE(0.4 MG) BY MOUTH DAILY     TAURINE PO Take 1 capsule by mouth daily     venlafaxine (EFFEXOR) 75 MG tablet TAKE 1 TABLET(75 MG) BY MOUTH TWICE DAILY     XARELTO ANTICOAGULANT 20 MG TABS tablet TAKE 1 TABLET(20 MG) BY MOUTH DAILY WITH DINNER     No current facility-administered medications for this visit.      Vitals:  Weight 266#   B/P 104-115/69-71  HR 71    Exam:  GEN: Pleasant male in NAD  CARDIAC: RRR  LUNGS: CTA.   ABDOMEN: Obese, NT  EXT: wearing compression wraps  NEURO: A/O  PSYCH: Bright affect, engaging     LABS:   CMP  Recent Labs   Lab Test 12/15/23  0822 11/06/23  1013 10/04/23  1358 07/26/23  0804 08/31/21  1401 06/21/21  1454 06/14/21  1455 04/21/21  0931 03/02/21  1405    138 137 138   < > 138 137 138 136   POTASSIUM 4.2 3.9 4.6 4.0   < > 4.1 4.7 3.7 4.5   CHLORIDE 98 98 97* 97*   < > 103 105 99 104   CO2 34* 29 29 32*   < > 29 28 39* 28   ANIONGAP 11 11 11 9   < > 6 4 <1* 4   GLC  108* 81 99 123*   < > 100* 108* 104* 93   BUN 28.1* 33.8* 52.1* 46.9*   < > 64* 62* 44* 57*   CR 1.25* 1.26* 1.50* 1.29*   < > 1.57* 1.81* 1.02 1.42*   GFRESTIMATED 57* 56* 46* 55*   < > 40* 34* 68 46*   GFRESTBLACK  --   --   --   --   --  47* 39* 79 53*   LAWSON 10.3* 9.5 9.8 10.0   < > 9.4 9.2 9.2 9.6    < > = values in this interval not displayed.     Recent Labs   Lab Test 11/06/23  1013 10/04/23  1358 06/19/23  1646 05/01/23  0826 04/19/23  0906   BILITOTAL 0.4 0.3  --  0.3 0.4   ALKPHOS 119 117  --  111 106   ALT 18 14 17 13 14   AST 18 18  --  19 21     CBC  Recent Labs   Lab Test 12/15/23  0822 11/06/23  1013 10/04/23  1358 07/26/23  0804 05/01/23  0826   HGB 12.6*  12.5* 11.8* 12.8* 11.1* 10.5*   WBC 9.8 8.9 8.2  --  9.8   RBC 3.96* 3.73* 4.14*  --  3.63*   HCT 39.4* 36.8* 41.3  --  33.8*    99 100  --  93   MCH 31.8 31.6 30.9  --  28.9   MCHC 32.0 32.1 31.0*  --  31.1*   RDW 15.2* 15.0 15.4*  --  16.1*    190 171  --  255     URINE STUDIES  Recent Labs   Lab Test 03/06/23  0345 09/22/22  1400 07/15/22  0046 03/25/22  1855 06/14/21  1516 04/21/21  0853 12/21/20  1219   COLOR Straw Light Yellow Yellow Yellow Yellow Yellow Yellow   APPEARANCE Clear Clear Clear Clear Cloudy Clear Clear   URINEGLC Negative Negative Negative Negative Negative Negative Negative   URINEBILI Negative Negative Negative Negative Negative Negative Negative   URINEKETONE Negative Negative Negative Negative Negative Negative Negative   SG 1.005 1.019 1.022 1.015 1.020 1.015 1.015   UBLD Negative Negative Negative Negative Small* Negative Negative   URINEPH 6.5 5.0 6.5 5.5 5.0 7.0 5.0   PROTEIN Negative Negative Negative Negative 30* Negative Negative   UROBILINOGEN  --   --   --  0.2 0.2 0.2 0.2   NITRITE Negative Negative Negative Negative Negative Negative Negative   LEUKEST Negative Negative Negative Negative Moderate* Negative Small*   RBCU 0 1 1  --  Unable to quantitate other elements due to packed WBC's.*  --  O - 2    WBCU 0 1 1  --  >100*  --  10-25*     Recent Labs   Lab Test 05/23/22  1356 02/04/22  0902 10/28/21  1118 06/14/21  1516   UTPG 0.18 0.29* 0.44* 0.38*     PTH  Recent Labs   Lab Test 12/15/23  0822 05/23/22  1356 06/14/21  1455   PTHI 64 92* 107*     IRON STUDIES  Recent Labs   Lab Test 12/15/23  0822 10/04/23  1358 05/03/23  0809 08/02/22  0829   IRON 98 68 30*  --     326 368  --    IRONSAT 32 21 8*  --    SEGUNDO  --  44 [phone]       Tiffany Suero NP      Again, thank you for allowing me to participate in the care of your patient.      Sincerely,    Tiffany Suero NP

## 2023-12-18 NOTE — PATIENT INSTRUCTIONS
Decrease Lisinopril to 20 mg daily ( 1/2 tablet of the 40 mg )  Check blood pressure daily ( mid day)  RN will call next week for update  See if you can get an Incentive Spirometer to help you deep breath. I would use hourly while awake

## 2023-12-19 NOTE — TELEPHONE ENCOUNTER
Provider called patient talked to him.  Nephrology seen patient today cut down lisinopril in half due to slight decrease in GFR.  Prednisone prescription was sent again weaning dose starting at 40 mg.  Patient understanding of plan and agreement and appreciative of follow-up phone call.  Dr Heath

## 2023-12-19 NOTE — PROGRESS NOTES
Nephrology Clinic Visit 12/18/23    Assessment and Plan:    1. 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 At our last visit and through about 4/23 was on 10 mg every day. At some point after that, the dose went to 40 mg every day and his creat liz to a peak of 1.5 and now around 1.2    Blood pressures are soft    Will decrease Lisinopril to 20 mg every day ( from 40 mg every day) . We have done this previously with improvement in his renal function without sacrificing his b/p control.     Baseline creat had been running mid to upper 1's since 8/20 w/recurrent DONA    - Etiology of his CKD likely HTN/DONA   - Renal US normal   - Does not use NSAIDs   - Blood pressures on the soft side   - DM well controlled with A1c of 5.8 % ( 12/23)   - He is on statin for CV risk reduction    2. CV/HTN/Pafib, Diastolic HF/PPM - Currently well controlled w/o edema. Jaun has gained considerable weight since our last visit. Home b/ps teens/. Clinic b/ps 104-115/69-71. Weight 266 # from 248 #, 262#. Was 292# in 6/21. Has not brought in his b/p device to be checked for accuracy. Albumin 4.2. Jaun notes that his Lasix was held when he was in TCU after his shoulder surgery in 2/23 and he developed significant edema. Has been trying to catch up since  Echo 2/23 showing EF 60%, IVC not dilated  He is on triple diuretic therapy.   Follows with Dr Jumana Dixon ( last seen 2/13/23) No changes recommended, but was not on high dose Lisinopril at that time    Current regimen:    Isosorbide 30 mg qd   Lasix 40 mg bid   Spironolactone 25 mg every other day   Lisinopril 40 mg every day   Diltiazem  mg every day   Metolazone 2.5 mg bid    - Decrease Lisinopril to 20 mg every day. He does not need this high dose from a B/P, Cardiac or Renal standpoint ( Has normal EF and no albuminuria). B/p goal would be < 130/80  given his age.     - Continue home monitoring. Will have nursing call him next week for update    - Repeat renal panel 1/31/23    3. Electrolytes - No acute concerns. Na 143, K 4.2   - On KCL 10 meq bid    4. Acid base - Bicarb 34 today. I suspect he is on the dry side given his diuretic therapy and higher creat in the setting of high dose ACE.     5. BMD - Ca 10.3 Phos 3.2 albumin 4.2   Vit D 48, PTH 64 ( 12/23)   No need for Ca/Vit D. I suspect his calcium is elevated due to over diuresis.     6. BPH - Controlled with Flomax    7. Anemia - Hgb 12.6   - Iron studies 12/23: Ferritin  Fe 98 IS 32   - On iron tab every other day.    - Does not meet LEIGH criteria     8. Disposition - RTC 3/18/23 for follow up w/labs prior    Assessment and plan was discussed with patient and he voiced his understanding and agreement.    Reason for Visit:  CKD2/HTN    HPI:  Mr Craven is an 82 yo male with CKD2, COPD, RIN on BiPAP, Diastolic HF, SSS s/p PPM, CAD, P-Afib, HTN, BPH, h/o Pulmonary embolism, present today for CKD follow up. Last seen in clinic by me on 1/3/23  Baseline creat has been < 1.1 since 7/22    ROS:   No complaints  Recovered  from successful left shoulder replacement 2/23  Did not qualify for the [employer] RIN program, so remains on his CPAP  Somewhere along the way his Lisinopril was increased to 40 mg every day, but patient unsure when and I can't find correlating documentation  Has gained considerable amt of weight since our last visit  Home blood teens/   Currently being treated for a respiratory process. He also notes cough  No CP  No abdominal concerns  Voiding w/o complication. On Flomax  Appetite is improving. Has been a vegetarian for years   Walking with his dog regularly    Chronic Health Problems:    CKD2  COPD  RIN on CPAP  Diastolic HF  SSS s/p PPM  CAD  P-Afib  h/o Pulmonary embolism  HTN  Hypothyroidism  Anxiety  BPH  HLD    Family Hx:   Family History   Problem Relation Age of Onset    Brain Cancer  Mother     Other Cancer Mother     Anxiety Disorder Mother     Cerebrovascular Disease Father     Hypertension Father     Obesity Father     Substance Abuse Son     Osteoporosis Sister     Thyroid Disease Sister     Anesthesia Reaction No family hx of     Thrombosis No family hx of      Personal Hx:   , lives with his dog Clayton. Lives in [facility] living apt connected to the [facility] . Has services and meals. He continues to set up his own medications. NS    Allergies:  Allergies   Allergen Reactions    No Known Allergies        Medications:  Current Outpatient Medications   Medication Sig    lisinopril (ZESTRIL) 40 MG tablet Take 0.5 tablets (20 mg) by mouth daily    predniSONE (DELTASONE) 20 MG tablet Take 1 tablet (20 mg) by mouth 2 times daily    acetaminophen (TYLENOL) 500 MG tablet Take 1,000 mg by mouth 3 times daily    Acetylcarnitine HCl (ACETYL L-CARNITINE PO) Take 1 capsule by mouth daily    albuterol (PROAIR HFA/PROVENTIL HFA/VENTOLIN HFA) 108 (90 Base) MCG/ACT inhaler Inhale 2 puffs into the lungs 4 times daily as needed for shortness of breath or wheezing    aspirin (ASA) 81 MG chewable tablet Take 1 tablet by mouth daily    atorvastatin (LIPITOR) 80 MG tablet Take 1 tablet (80 mg) by mouth every evening    azithromycin (ZITHROMAX) 250 MG tablet Take 2 tablets (500 mg) by mouth daily for 1 day, THEN 1 tablet (250 mg) daily for 4 days.    diltiazem ER COATED BEADS (CARDIZEM CD/CARTIA XT) 300 MG 24 hr capsule Take 1 capsule (300 mg) by mouth daily    ferrous sulfate (SLO-FE) 142 (45 Fe) MG CR tablet Take 1 tablet (142 mg) by mouth every other day    Fluticasone-Umeclidin-Vilanterol (TRELEGY ELLIPTA) 200-62.5-25 MCG/INH oral inhaler Inhale 1 puff into the lungs daily    furosemide (LASIX) 40 MG tablet Take 1 tablet (40 mg) by mouth 2 times daily    isosorbide mononitrate (IMDUR) 30 MG 24 hr tablet Take 1 tablet (30 mg) by mouth daily    levothyroxine (SYNTHROID/LEVOTHROID)  175 MCG tablet Take 1 tablet (175 mcg) by mouth daily    metolazone (ZAROXOLYN) 2.5 MG tablet TAKE 1 TABLET(2.5 MG) BY MOUTH TWICE DAILY    Multiple vitamin TABS Take 1 tablet by mouth daily    polyethylene glycol (MIRALAX) 17 g packet Take 17 g by mouth daily    potassium chloride ER (KLOR-CON M) 10 MEQ CR tablet Take 1 tablet (10 mEq) by mouth 2 times daily    predniSONE (DELTASONE) 20 MG tablet Take 2 tablets on day 1 and 2, 1-1/2 tablet on day 3 and 4, 1 tablet on days 5 and 6, half tablet on day 7 and 8 and stop    spironolactone (ALDACTONE) 25 MG tablet TAKE 1 TABLET(25 MG) BY MOUTH EVERY OTHER DAY    tamsulosin (FLOMAX) 0.4 MG capsule TAKE 1 CAPSULE(0.4 MG) BY MOUTH DAILY    TAURINE PO Take 1 capsule by mouth daily    venlafaxine (EFFEXOR) 75 MG tablet TAKE 1 TABLET(75 MG) BY MOUTH TWICE DAILY    XARELTO ANTICOAGULANT 20 MG TABS tablet TAKE 1 TABLET(20 MG) BY MOUTH DAILY WITH DINNER     No current facility-administered medications for this visit.      Vitals:  Weight 266#   B/P 104-115/69-71  HR 71    Exam:  GEN: Pleasant male in NAD  CARDIAC: RRR  LUNGS: CTA.   ABDOMEN: Obese, NT  EXT: wearing compression wraps  NEURO: A/O  PSYCH: Bright affect, engaging     LABS:   CMP  Recent Labs   Lab Test 12/15/23  0822 11/06/23  1013 10/04/23  1358 07/26/23  0804 08/31/21  1401 06/21/21  1454 06/14/21  1455 04/21/21  0931 03/02/21  1405    138 137 138   < > 138 137 138 136   POTASSIUM 4.2 3.9 4.6 4.0   < > 4.1 4.7 3.7 4.5   CHLORIDE 98 98 97* 97*   < > 103 105 99 104   CO2 34* 29 29 32*   < > 29 28 39* 28   ANIONGAP 11 11 11 9   < > 6 4 <1* 4   * 81 99 123*   < > 100* 108* 104* 93   BUN 28.1* 33.8* 52.1* 46.9*   < > 64* 62* 44* 57*   CR 1.25* 1.26* 1.50* 1.29*   < > 1.57* 1.81* 1.02 1.42*   GFRESTIMATED 57* 56* 46* 55*   < > 40* 34* 68 46*   GFRESTBLACK  --   --   --   --   --  47* 39* 79 53*   LAWSON 10.3* 9.5 9.8 10.0   < > 9.4 9.2 9.2 9.6    < > = values in this interval not displayed.     Recent Labs    Lab Test 11/06/23  1013 10/04/23  1358 06/19/23  1646 05/01/23  0826 04/19/23  0906   BILITOTAL 0.4 0.3  --  0.3 0.4   ALKPHOS 119 117  --  111 106   ALT 18 14 17 13 14   AST 18 18  --  19 21     CBC  Recent Labs   Lab Test 12/15/23  0822 11/06/23  1013 10/04/23  1358 07/26/23  0804 05/01/23  0826   HGB 12.6*  12.5* 11.8* 12.8* 11.1* 10.5*   WBC 9.8 8.9 8.2  --  9.8   RBC 3.96* 3.73* 4.14*  --  3.63*   HCT 39.4* 36.8* 41.3  --  33.8*    99 100  --  93   MCH 31.8 31.6 30.9  --  28.9   MCHC 32.0 32.1 31.0*  --  31.1*   RDW 15.2* 15.0 15.4*  --  16.1*    190 171  --  255     URINE STUDIES  Recent Labs   Lab Test 03/06/23  0345 09/22/22  1400 07/15/22  0046 03/25/22  1855 06/14/21  1516 04/21/21  0853 12/21/20  1219   COLOR Straw Light Yellow Yellow Yellow Yellow Yellow Yellow   APPEARANCE Clear Clear Clear Clear Cloudy Clear Clear   URINEGLC Negative Negative Negative Negative Negative Negative Negative   URINEBILI Negative Negative Negative Negative Negative Negative Negative   URINEKETONE Negative Negative Negative Negative Negative Negative Negative   SG 1.005 1.019 1.022 1.015 1.020 1.015 1.015   UBLD Negative Negative Negative Negative Small* Negative Negative   URINEPH 6.5 5.0 6.5 5.5 5.0 7.0 5.0   PROTEIN Negative Negative Negative Negative 30* Negative Negative   UROBILINOGEN  --   --   --  0.2 0.2 0.2 0.2   NITRITE Negative Negative Negative Negative Negative Negative Negative   LEUKEST Negative Negative Negative Negative Moderate* Negative Small*   RBCU 0 1 1  --  Unable to quantitate other elements due to packed WBC's.*  --  O - 2   WBCU 0 1 1  --  >100*  --  10-25*     Recent Labs   Lab Test 05/23/22  1356 02/04/22  0902 10/28/21  1118 06/14/21  1516   UTPG 0.18 0.29* 0.44* 0.38*     PTH  Recent Labs   Lab Test 12/15/23  0822 05/23/22  1356 06/14/21  1455   PTHI 64 92* 107*     IRON STUDIES  Recent Labs   Lab Test 12/15/23  0822 10/04/23  1358 05/03/23  0809 08/02/22  0829   IRON 98 68 30*   --     326 368  --    IRONSAT 32 21 8*  --    SEGUNDO  --  44 [phone] 58       Tiffany Suero, NP

## 2023-12-26 ENCOUNTER — HOSPITAL ENCOUNTER (OUTPATIENT)
Dept: CARDIAC REHAB | Facility: CLINIC | Age: 83
Setting detail: THERAPIES SERIES
Discharge: HOME | End: 2023-12-26
Attending: INTERNAL MEDICINE
Payer: MEDICARE

## 2023-12-26 ENCOUNTER — TELEPHONE (OUTPATIENT)
Dept: NEPHROLOGY | Facility: CLINIC | Age: 83
End: 2023-12-26

## 2023-12-26 PROCEDURE — 94626 PHY/QHP OP PULM RHB W/MNTR: CPT

## 2023-12-26 NOTE — TELEPHONE ENCOUNTER
From: Tiffany Suero NP   Sent: 12/19/2023   7:55 AM CST   To: MARIA DE JESUS [place]   Can you call Jaun next week and check his b/p?   I decreased his Lisinopril to 20 mg every day from 40 mg every day   Thanks   D

## 2024-01-08 NOTE — TELEPHONE ENCOUNTER
Received  a phone call from Jaun. He reports he has only taken 2 BP's. Sunday BP was 122/72 and today was 143/82. Alisia Suero had recently decreased his lisinopril due to having a low BP.  Writer asked him to take some more BP's this week just to make sure his BP's are consistently sitting in the 140's with the decrease in lisinopril. He reports he is able to do that and will call writer on Friday.

## 2024-01-12 ENCOUNTER — OFFICE VISIT (OUTPATIENT)
Dept: FAMILY MEDICINE | Facility: CLINIC | Age: 84
End: 2024-01-12
Payer: MEDICARE

## 2024-01-12 ENCOUNTER — ANCILLARY PROCEDURE (OUTPATIENT)
Dept: GENERAL RADIOLOGY | Facility: CLINIC | Age: 84
End: 2024-01-12
Attending: INTERNAL MEDICINE
Payer: MEDICARE

## 2024-01-12 VITALS
OXYGEN SATURATION: 94 % | BODY MASS INDEX: 36.8 KG/M2 | TEMPERATURE: 98 F | HEART RATE: 88 BPM | WEIGHT: 271.7 LBS | DIASTOLIC BLOOD PRESSURE: 73 MMHG | HEIGHT: 72 IN | RESPIRATION RATE: 18 BRPM | SYSTOLIC BLOOD PRESSURE: 125 MMHG

## 2024-01-12 DIAGNOSIS — I50.32 CHRONIC DIASTOLIC HEART FAILURE (H): ICD-10-CM

## 2024-01-12 DIAGNOSIS — R05.9 COUGH, UNSPECIFIED TYPE: ICD-10-CM

## 2024-01-12 DIAGNOSIS — R06.00 DYSPNEA, UNSPECIFIED TYPE: Primary | ICD-10-CM

## 2024-01-12 DIAGNOSIS — R05.2 SUBACUTE COUGH: ICD-10-CM

## 2024-01-12 DIAGNOSIS — D64.9 MILD ANEMIA: ICD-10-CM

## 2024-01-12 DIAGNOSIS — E03.9 HYPOTHYROIDISM, UNSPECIFIED TYPE: ICD-10-CM

## 2024-01-12 DIAGNOSIS — N18.2 TYPE 2 DIABETES MELLITUS WITH STAGE 2 CHRONIC KIDNEY DISEASE, WITHOUT LONG-TERM CURRENT USE OF INSULIN (H): ICD-10-CM

## 2024-01-12 DIAGNOSIS — R93.89 ABNORMAL CHEST X-RAY: Primary | ICD-10-CM

## 2024-01-12 DIAGNOSIS — R06.00 DYSPNEA, UNSPECIFIED TYPE: ICD-10-CM

## 2024-01-12 DIAGNOSIS — Z13.21 ENCOUNTER FOR VITAMIN DEFICIENCY SCREENING: ICD-10-CM

## 2024-01-12 DIAGNOSIS — E11.22 TYPE 2 DIABETES MELLITUS WITH STAGE 2 CHRONIC KIDNEY DISEASE, WITHOUT LONG-TERM CURRENT USE OF INSULIN (H): ICD-10-CM

## 2024-01-12 DIAGNOSIS — J44.1 COPD EXACERBATION (H): ICD-10-CM

## 2024-01-12 LAB
BASOPHILS # BLD AUTO: 0 10E3/UL (ref 0–0.2)
BASOPHILS NFR BLD AUTO: 0 %
EOSINOPHIL # BLD AUTO: 0.2 10E3/UL (ref 0–0.7)
EOSINOPHIL NFR BLD AUTO: 2 %
ERYTHROCYTE [DISTWIDTH] IN BLOOD BY AUTOMATED COUNT: 14.4 % (ref 10–15)
HBA1C MFR BLD: 6 % (ref 0–5.6)
HCT VFR BLD AUTO: 37.2 % (ref 40–53)
HGB BLD-MCNC: 12.1 G/DL (ref 13.3–17.7)
IMM GRANULOCYTES # BLD: 0 10E3/UL
IMM GRANULOCYTES NFR BLD: 0 %
LYMPHOCYTES # BLD AUTO: 2 10E3/UL (ref 0.8–5.3)
LYMPHOCYTES NFR BLD AUTO: 20 %
MCH RBC QN AUTO: 32.5 PG (ref 26.5–33)
MCHC RBC AUTO-ENTMCNC: 32.5 G/DL (ref 31.5–36.5)
MCV RBC AUTO: 100 FL (ref 78–100)
MONOCYTES # BLD AUTO: 0.9 10E3/UL (ref 0–1.3)
MONOCYTES NFR BLD AUTO: 8 %
NEUTROPHILS # BLD AUTO: 7 10E3/UL (ref 1.6–8.3)
NEUTROPHILS NFR BLD AUTO: 69 %
PLATELET # BLD AUTO: 169 10E3/UL (ref 150–450)
RBC # BLD AUTO: 3.72 10E6/UL (ref 4.4–5.9)
WBC # BLD AUTO: 10.1 10E3/UL (ref 4–11)

## 2024-01-12 PROCEDURE — 82607 VITAMIN B-12: CPT | Performed by: INTERNAL MEDICINE

## 2024-01-12 PROCEDURE — 80053 COMPREHEN METABOLIC PANEL: CPT | Performed by: INTERNAL MEDICINE

## 2024-01-12 PROCEDURE — 99214 OFFICE O/P EST MOD 30 MIN: CPT | Mod: 25 | Performed by: INTERNAL MEDICINE

## 2024-01-12 PROCEDURE — 71046 X-RAY EXAM CHEST 2 VIEWS: CPT

## 2024-01-12 PROCEDURE — 93000 ELECTROCARDIOGRAM COMPLETE: CPT | Performed by: INTERNAL MEDICINE

## 2024-01-12 PROCEDURE — 82728 ASSAY OF FERRITIN: CPT | Performed by: INTERNAL MEDICINE

## 2024-01-12 PROCEDURE — 85025 COMPLETE CBC W/AUTO DIFF WBC: CPT | Performed by: INTERNAL MEDICINE

## 2024-01-12 PROCEDURE — 83880 ASSAY OF NATRIURETIC PEPTIDE: CPT | Performed by: INTERNAL MEDICINE

## 2024-01-12 PROCEDURE — 83036 HEMOGLOBIN GLYCOSYLATED A1C: CPT | Performed by: INTERNAL MEDICINE

## 2024-01-12 PROCEDURE — 84443 ASSAY THYROID STIM HORMONE: CPT | Performed by: INTERNAL MEDICINE

## 2024-01-12 PROCEDURE — 36415 COLL VENOUS BLD VENIPUNCTURE: CPT | Performed by: INTERNAL MEDICINE

## 2024-01-12 RX ORDER — DOXYCYCLINE 100 MG/1
100 CAPSULE ORAL 2 TIMES DAILY
Qty: 14 CAPSULE | Refills: 0 | Status: SHIPPED | OUTPATIENT
Start: 2024-01-12 | End: 2024-02-02

## 2024-01-12 RX ORDER — PREDNISONE 10 MG/1
TABLET ORAL
Qty: 32 TABLET | Refills: 0 | Status: SHIPPED | OUTPATIENT
Start: 2024-01-12 | End: 2024-02-07

## 2024-01-12 ASSESSMENT — PAIN SCALES - GENERAL: PAINLEVEL: NO PAIN (0)

## 2024-01-12 NOTE — PROGRESS NOTES
Talk to patient about the chest x-ray and CBC result, CBC shows normal white count with differential, chest x-ray there is left base patchy opacity could be atelectasis versus early pneumonia.  Started patient on a weaning dose of prednisone due to his dyspnea occasional wheezing as well as some coverage with doxycycline for possible pneumonia.  Patient agreement with plan

## 2024-01-12 NOTE — PROGRESS NOTES
Assessment & Plan   Problem List Items Addressed This Visit       Chronic diastolic heart failure (H)    Relevant Orders    Comprehensive metabolic panel (BMP + Alb, Alk Phos, ALT, AST, Total. Bili, TP)    BNP-N terminal pro    Dyspnea - Primary    Relevant Orders    EKG 12-lead complete w/read - Clinics (Completed)    XR Chest 2 Views    CBC with platelets and differential    BNP-N terminal pro    Hypothyroidism    Relevant Orders    TSH with free T4 reflex    Type 2 diabetes mellitus with stage 2 chronic kidney disease, without long-term current use of insulin (H)    Relevant Orders    Hemoglobin A1c     Other Visit Diagnoses       Cough, unspecified type        Relevant Orders    XR Chest 2 Views    CBC with platelets and differential    BNP-N terminal pro    Mild anemia        Relevant Orders    Ferritin    Vitamin B12    Encounter for vitamin deficiency screening        Relevant Orders    Vitamin B12           Patient complaining of some mild generalized nonspecific symptoms of fatigue, continues to have nocturnal cough and some dyspnea on exertion.  He is more gassy and having more flatulence than the takes probiotics and MiraLAX daily to help with his bowel movements.  Will check again repeat some basic labs including thyroid function tests electrolytes and repeat chest x-ray there was an opacity that was suspected to be atelectasis but he received a course of prednisone and Z-Deny last months.  He has mild anemia we will check some vitamin levels and ferritin.  proBNP has chronic diastolic heart dysfunction and his COPD underlying he will need follow-up with the pulmonary specialist will be starting pulmonary rehab next week.  And has a follow-up appoint with cardiology end of this month.  Further recommendation pending lab results.  Patient is not hemodynamically unstable, his pulse ox is 94 to 95% on room air has fine rales on exam           Anne Heath MD  [facility]  HERNAN Zamorano is a 83 year old, presenting for the following health issues:  Follow Up (Chest pain with congestion, dizziness reported ,1 week ), Cough (Mucus wet, clear mucus), and Health Maintenance (Eye exam: still waiting on appt confirmation)        1/12/2024    10:31 AM   Additional Questions   Roomed by Willie   Accompanied by Not applicable, by themselves       History of Present Illness       Reason for visit:  Continue respiratory problemsHe consumes 2 sweetened beverage(s) daily.He exercises with enough effort to increase his heart rate 10 to 19 minutes per day.  He exercises with enough effort to increase his heart rate 5 days per week.   He is taking medications regularly.  Presenting for follow-up.  Has been having more fatigue over the last couple weeks and some clear phlegm : nocturnal cough symptoms little hard.  Also has been having more flatulence and gassy.    Some shortness of breath as well.  Fatigue as well as positing and coughing with interrupted sleep.  Dose of lisinopril was decreased to 20 mg daily.    Has been having more night sweats than usual.    He is on Effexor.  Mood is down in general.  His weight is stable.    Has compression socks    Stable lymphedema.  He applies Velcro wraps alternating with compression stockings.    He works at [employer] with struggle with the change in weather.  Has an indoor walk side, he can walk. Pulse is slightly higher during the day in 90's    Feels tight in his chest and there is increased phlegm that he has to cough out.      Starting pulmonary rehab next Thursday.    Using MiraLAX daily is harder to poop without the MiraLAX.    Review of Systems   Constitutional, HEENT, cardiovascular, pulmonary, gi and gu systems are negative, except as otherwise noted.      Objective    /73 (BP Location: Left arm, Patient Position: Sitting, Cuff Size: Adult Large)   Pulse 88   Temp 98  F (36.7  C) (Oral)   Resp 18   Ht 1.829 m (6')   Wt  123.2 kg (271 lb 11.2 oz)   SpO2 94%   BMI 36.85 kg/m    Body mass index is 36.85 kg/m .  Physical Exam   GENERAL: healthy, alert and no distress  EYES: Eyes grossly normal to inspection, PERRL and conjunctivae and sclerae normal  NECK: no adenopathy, no asymmetry, masses, or scars and thyroid normal to palpation  RESP: lungs fine rales bilateral no  rhonchi or wheezes, no use of accessory muscles  CV: regular rate and rhythm, normal S1 S2, no S3 or S4, no murmur, click or rub, no peripheral edema and peripheral pulses strong  ABDOMEN: soft, nontender, distended, no hepatosplenomegaly, no masses and bowel sounds normal  MS: no gross musculoskeletal defects noted, no edema  SKIN: no suspicious lesions or rashes  NEURO: Normal strength and tone, mentation intact and speech normal  PSYCH: mentation appears normal, affect normal/bright    Office Visit on 12/15/2023   Component Date Value Ref Range Status    Cholesterol 12/15/2023 135  <200 mg/dL Final    Triglycerides 12/15/2023 156 (H)  <150 mg/dL Final    Direct Measure HDL 12/15/2023 47  >=40 mg/dL Final    LDL Cholesterol Calculated 12/15/2023 57  <=100 mg/dL Final    Non HDL Cholesterol 12/15/2023 88  <130 mg/dL Final    Hemoglobin A1C 12/15/2023 5.8 (H)  0.0 - 5.6 % Final    Normal <5.7%   Prediabetes 5.7-6.4%    Diabetes 6.5% or higher     Note: Adopted from ADA consensus guidelines.    Creatinine Urine mg/dL 12/15/2023 34.0  mg/dL Final    The reference ranges have not been established in urine creatinine. The results should be integrated into the clinical context for interpretation.  The reference ranges have not been established in urine creatinine. The results should be integrated into the clinical context for interpretation.    Albumin Urine mg/L 12/15/2023 <12.0  mg/L Final    The reference ranges have not been established in urine albumin. The results should be integrated into the clinical context for interpretation.    Albumin Urine mg/g Cr 12/15/2023     Final    Unable to calculate, urine albumin and/or urine creatinine is outside detectable limits.  Microalbuminuria is defined as an albumin:creatinine ratio of 17 to 299 for males and 25 to 299 for females. A ratio of albumin:creatinine of 300 or higher is indicative of overt proteinuria.  Due to biologic variability, positive results should be confirmed by a second, first-morning random or 24-hour timed urine specimen. If there is discrepancy, a third specimen is recommended. When 2 out of 3 results are in the microalbuminuria range, this is evidence for incipient nephropathy and warrants increased efforts at glucose control, blood pressure control, and institution of therapy with an angiotensin-converting-enzyme (ACE) inhibitor (if the patient can tolerate it).      N Terminal Pro BNP Outpatient 12/15/2023 91  0 - 1,800 pg/mL Final    Reference range shown and results flagged as abnormal are for the outpatient, non acute settings. Establishing a baseline value for each individual patient is useful for follow-up.    Suggested inpatient cut points for confirming diagnosis of CHF in an acute setting are:  >450 pg/mL (age 18 to less than 50)  >900 pg/mL (age 50 to less than 75)  >1800 pg/mL (75 yrs and older)    An inpatient or emergency department NT-proPBNP <300 pg/mL effectively rules out acute CHF, with 99% negative predictive value.        CRP Inflammation 12/15/2023 <3.00  <5.00 mg/L Final    WBC Count 12/15/2023 9.8  4.0 - 11.0 10e3/uL Final    RBC Count 12/15/2023 3.96 (L)  4.40 - 5.90 10e6/uL Final    Hemoglobin 12/15/2023 12.6 (L)  13.3 - 17.7 g/dL Final    Hematocrit 12/15/2023 39.4 (L)  40.0 - 53.0 % Final    MCV 12/15/2023 100  78 - 100 fL Final    MCH 12/15/2023 31.8  26.5 - 33.0 pg Final    MCHC 12/15/2023 32.0  31.5 - 36.5 g/dL Final    RDW 12/15/2023 15.2 (H)  10.0 - 15.0 % Final    Platelet Count 12/15/2023 213  150 - 450 10e3/uL Final    % Neutrophils 12/15/2023 68  % Final    % Lymphocytes  12/15/2023 20  % Final    % Monocytes 12/15/2023 10  % Final    % Eosinophils 12/15/2023 2  % Final    % Basophils 12/15/2023 0  % Final    % Immature Granulocytes 12/15/2023 1  % Final    Absolute Neutrophils 12/15/2023 6.6  1.6 - 8.3 10e3/uL Final    Absolute Lymphocytes 12/15/2023 2.0  0.8 - 5.3 10e3/uL Final    Absolute Monocytes 12/15/2023 1.0  0.0 - 1.3 10e3/uL Final    Absolute Eosinophils 12/15/2023 0.2  0.0 - 0.7 10e3/uL Final    Absolute Basophils 12/15/2023 0.0  0.0 - 0.2 10e3/uL Final    Absolute Immature Granulocytes 12/15/2023 0.1  <=0.4 10e3/uL Final

## 2024-01-13 LAB
ALBUMIN SERPL BCG-MCNC: 4.3 G/DL (ref 3.5–5.2)
ALP SERPL-CCNC: 98 U/L (ref 40–150)
ALT SERPL W P-5'-P-CCNC: 18 U/L (ref 0–70)
ANION GAP SERPL CALCULATED.3IONS-SCNC: 11 MMOL/L (ref 7–15)
AST SERPL W P-5'-P-CCNC: 18 U/L (ref 0–45)
BILIRUB SERPL-MCNC: 0.4 MG/DL
BUN SERPL-MCNC: 31.6 MG/DL (ref 8–23)
CALCIUM SERPL-MCNC: 10 MG/DL (ref 8.8–10.2)
CHLORIDE SERPL-SCNC: 100 MMOL/L (ref 98–107)
CREAT SERPL-MCNC: 1.12 MG/DL (ref 0.67–1.17)
DEPRECATED HCO3 PLAS-SCNC: 29 MMOL/L (ref 22–29)
EGFRCR SERPLBLD CKD-EPI 2021: 65 ML/MIN/1.73M2
FERRITIN SERPL-MCNC: 80 NG/ML (ref 31–409)
GLUCOSE SERPL-MCNC: 86 MG/DL (ref 70–99)
NT-PROBNP SERPL-MCNC: 123 PG/ML (ref 0–1800)
POTASSIUM SERPL-SCNC: 4.4 MMOL/L (ref 3.4–5.3)
PROT SERPL-MCNC: 6.7 G/DL (ref 6.4–8.3)
SODIUM SERPL-SCNC: 140 MMOL/L (ref 135–145)
TSH SERPL DL<=0.005 MIU/L-ACNC: 0.56 UIU/ML (ref 0.3–4.2)
VIT B12 SERPL-MCNC: 1260 PG/ML (ref 232–1245)

## 2024-01-15 ENCOUNTER — MYC REFILL (OUTPATIENT)
Dept: OTHER | Facility: CLINIC | Age: 84
End: 2024-01-15
Payer: MEDICARE

## 2024-01-15 ENCOUNTER — TELEPHONE (OUTPATIENT)
Dept: FAMILY MEDICINE | Facility: CLINIC | Age: 84
End: 2024-01-15
Payer: MEDICARE

## 2024-01-15 DIAGNOSIS — I48.0 PAROXYSMAL A-FIB (H): ICD-10-CM

## 2024-01-15 DIAGNOSIS — I82.4Z9 DEEP VEIN THROMBOSIS (DVT) OF DISTAL VEIN OF LOWER EXTREMITY, UNSPECIFIED CHRONICITY, UNSPECIFIED LATERALITY (H): ICD-10-CM

## 2024-01-15 NOTE — RESULT ENCOUNTER NOTE
Real Zamorano, I reviewed rest of your labs,    Please reassure your kidney function is back to normal; great news.  Please keep well-hydrated, urea nitrogen slightly elevated still.  Probably is diuretic effect as well, electrolytes are normal; reassuring,    Liver enzymes are normal called AST ALT,  Total protein is normal ,    Vitamin B12 slightly elevated,    Test for heart failure called N-terminal proBNP is within normal and reassuring suggest that unlikely you have heart failure as a cause of your shortness of breath.    Ferritin is within normal range.    Thyroid test called TSH is normal,    CBC shows normal blood counts including normal white blood cell count ,mild anemia ,normal platelet count; reassuring and normal differential on the CBC that is the percentage of neutrophils lymphocytes suggest no underlying systemic infection.    Please be reassured with your lab results, congratulations,    Regards,  Dr. Heath

## 2024-01-15 NOTE — TELEPHONE ENCOUNTER
Patient Contact    Attempt # 1    Was call answered?  No.  Left message on voicemail with information to call back.    Keisha MARTIN, Triage RN  [facility]

## 2024-01-15 NOTE — TELEPHONE ENCOUNTER
Patient calling clinic back. RN relayed PCP lab interpretations to patient. Patient verbalized understanding and did not have any additional questions or concerns at this time.

## 2024-01-15 NOTE — TELEPHONE ENCOUNTER
----- Message from Anne Heath MD sent at 1/15/2024  8:34 AM CST -----  Real Zamorano, I reviewed rest of your labs,    Please reassure your kidney function is back to normal; great news.  Please keep well-hydrated, urea nitrogen slightly elevated still.  Probably is diuretic effect as well, electrolytes are normal; reassuring,    Liver enzymes are normal called AST ALT,  Total protein is normal ,    Vitamin B12 slightly elevated,    Test for heart failure called N-terminal proBNP is within normal and reassuring suggest that unlikely you have heart failure as a cause of your shortness of breath.    Ferritin is within normal range.    Thyroid test called TSH is normal,    CBC shows normal blood counts including normal white blood cell count ,mild anemia ,normal platelet count; reassuring and normal differential on the CBC that is the percentage of neutrophils lymphocytes suggest no underlying systemic infection.    Please be reassured with your lab results, congratulations,    Regards,  Dr. Heath

## 2024-01-16 NOTE — TELEPHONE ENCOUNTER
Received VM from Jaun. He reports blood pressures in the 120-133/70's range over the last week or so.

## 2024-01-18 ENCOUNTER — HOSPITAL ENCOUNTER (OUTPATIENT)
Dept: CARDIAC REHAB | Facility: CLINIC | Age: 84
Discharge: HOME | End: 2024-01-18
Attending: INTERNAL MEDICINE
Payer: MEDICARE

## 2024-01-18 PROCEDURE — 94625 PHY/QHP OP PULM RHB W/O MNTR: CPT

## 2024-01-23 ENCOUNTER — HOSPITAL ENCOUNTER (OUTPATIENT)
Dept: CARDIAC REHAB | Facility: CLINIC | Age: 84
Discharge: HOME | End: 2024-01-23
Attending: INTERNAL MEDICINE
Payer: MEDICARE

## 2024-01-23 PROCEDURE — 94625 PHY/QHP OP PULM RHB W/O MNTR: CPT

## 2024-01-24 ENCOUNTER — MYC REFILL (OUTPATIENT)
Dept: FAMILY MEDICINE | Facility: CLINIC | Age: 84
End: 2024-01-24
Payer: MEDICARE

## 2024-01-24 ENCOUNTER — MYC MEDICAL ADVICE (OUTPATIENT)
Dept: FAMILY MEDICINE | Facility: CLINIC | Age: 84
End: 2024-01-24
Payer: MEDICARE

## 2024-01-24 DIAGNOSIS — E03.9 HYPOTHYROIDISM, UNSPECIFIED TYPE: ICD-10-CM

## 2024-01-24 DIAGNOSIS — I50.32 CHRONIC DIASTOLIC HEART FAILURE (H): ICD-10-CM

## 2024-01-24 DIAGNOSIS — I10 ESSENTIAL HYPERTENSION: ICD-10-CM

## 2024-01-24 RX ORDER — FUROSEMIDE 40 MG
40 TABLET ORAL 2 TIMES DAILY
Qty: 180 TABLET | Refills: 1 | Status: SHIPPED | OUTPATIENT
Start: 2024-01-24 | End: 2024-04-25

## 2024-01-24 RX ORDER — DILTIAZEM HYDROCHLORIDE 300 MG/1
300 CAPSULE, COATED, EXTENDED RELEASE ORAL DAILY
Qty: 90 CAPSULE | Refills: 0 | Status: SHIPPED | OUTPATIENT
Start: 2024-01-24 | End: 2024-04-22

## 2024-01-25 ENCOUNTER — HOSPITAL ENCOUNTER (OUTPATIENT)
Dept: CARDIAC REHAB | Facility: CLINIC | Age: 84
Discharge: HOME | End: 2024-01-25
Attending: INTERNAL MEDICINE
Payer: MEDICARE

## 2024-01-25 PROCEDURE — 94625 PHY/QHP OP PULM RHB W/O MNTR: CPT

## 2024-01-25 RX ORDER — LEVOTHYROXINE SODIUM 175 UG/1
175 TABLET ORAL DAILY
Qty: 90 TABLET | Refills: 3 | Status: SHIPPED | OUTPATIENT
Start: 2024-01-25

## 2024-01-30 ENCOUNTER — HOSPITAL ENCOUNTER (OUTPATIENT)
Dept: CARDIAC REHAB | Facility: CLINIC | Age: 84
Discharge: HOME | End: 2024-01-30
Attending: INTERNAL MEDICINE
Payer: MEDICARE

## 2024-01-30 PROCEDURE — 94625 PHY/QHP OP PULM RHB W/O MNTR: CPT

## 2024-01-31 ENCOUNTER — HOSPITAL ENCOUNTER (OUTPATIENT)
Dept: CARDIOLOGY | Facility: CLINIC | Age: 84
Discharge: HOME | End: 2024-01-31
Attending: INTERNAL MEDICINE | Admitting: INTERNAL MEDICINE
Payer: MEDICARE

## 2024-01-31 ENCOUNTER — LAB (OUTPATIENT)
Dept: LAB | Facility: CLINIC | Age: 84
End: 2024-01-31
Payer: MEDICARE

## 2024-01-31 DIAGNOSIS — I25.10 CORONARY ARTERY DISEASE INVOLVING NATIVE CORONARY ARTERY OF NATIVE HEART WITHOUT ANGINA PECTORIS: ICD-10-CM

## 2024-01-31 DIAGNOSIS — I10 ESSENTIAL HYPERTENSION: ICD-10-CM

## 2024-01-31 DIAGNOSIS — I50.30 HEART FAILURE WITH PRESERVED EJECTION FRACTION, UNSPECIFIED HF CHRONICITY (H): ICD-10-CM

## 2024-01-31 DIAGNOSIS — I25.119 CORONARY ARTERY DISEASE INVOLVING NATIVE CORONARY ARTERY WITH ANGINA PECTORIS, UNSPECIFIED WHETHER NATIVE OR TRANSPLANTED HEART (H): Primary | ICD-10-CM

## 2024-01-31 DIAGNOSIS — I77.810 ASCENDING AORTA DILATATION (H): ICD-10-CM

## 2024-01-31 LAB
ALT SERPL W P-5'-P-CCNC: 23 U/L (ref 0–70)
ANION GAP SERPL CALCULATED.3IONS-SCNC: 8 MMOL/L (ref 7–15)
BUN SERPL-MCNC: 22.5 MG/DL (ref 8–23)
CALCIUM SERPL-MCNC: 9.7 MG/DL (ref 8.8–10.2)
CHLORIDE SERPL-SCNC: 100 MMOL/L (ref 98–107)
CHOLEST SERPL-MCNC: 128 MG/DL
CREAT SERPL-MCNC: 1.14 MG/DL (ref 0.67–1.17)
DEPRECATED HCO3 PLAS-SCNC: 32 MMOL/L (ref 22–29)
EGFRCR SERPLBLD CKD-EPI 2021: 63 ML/MIN/1.73M2
FASTING STATUS PATIENT QL REPORTED: YES
GLUCOSE SERPL-MCNC: 78 MG/DL (ref 70–99)
HDLC SERPL-MCNC: 57 MG/DL
LDLC SERPL CALC-MCNC: 50 MG/DL
LVEF ECHO: NORMAL
NONHDLC SERPL-MCNC: 71 MG/DL
POTASSIUM SERPL-SCNC: 4.1 MMOL/L (ref 3.4–5.3)
SODIUM SERPL-SCNC: 140 MMOL/L (ref 135–145)
TRIGL SERPL-MCNC: 104 MG/DL

## 2024-01-31 PROCEDURE — 255N000002 HC RX 255 OP 636: Performed by: INTERNAL MEDICINE

## 2024-01-31 PROCEDURE — 999N000208 ECHOCARDIOGRAM COMPLETE

## 2024-01-31 PROCEDURE — 80048 BASIC METABOLIC PNL TOTAL CA: CPT | Performed by: INTERNAL MEDICINE

## 2024-01-31 PROCEDURE — 36415 COLL VENOUS BLD VENIPUNCTURE: CPT | Performed by: INTERNAL MEDICINE

## 2024-01-31 PROCEDURE — 93306 TTE W/DOPPLER COMPLETE: CPT | Mod: 26 | Performed by: INTERNAL MEDICINE

## 2024-01-31 PROCEDURE — C8929 TTE W OR WO FOL WCON,DOPPLER: HCPCS

## 2024-01-31 PROCEDURE — 80061 LIPID PANEL: CPT | Performed by: INTERNAL MEDICINE

## 2024-01-31 PROCEDURE — 84460 ALANINE AMINO (ALT) (SGPT): CPT | Performed by: INTERNAL MEDICINE

## 2024-01-31 RX ADMIN — HUMAN ALBUMIN MICROSPHERES AND PERFLUTREN 3 ML: 10; .22 INJECTION, SOLUTION INTRAVENOUS at 11:33

## 2024-02-01 ENCOUNTER — HOSPITAL ENCOUNTER (OUTPATIENT)
Dept: CARDIAC REHAB | Facility: CLINIC | Age: 84
Discharge: HOME | End: 2024-02-01
Attending: INTERNAL MEDICINE
Payer: MEDICARE

## 2024-02-01 PROCEDURE — 94625 PHY/QHP OP PULM RHB W/O MNTR: CPT

## 2024-02-02 ENCOUNTER — OFFICE VISIT (OUTPATIENT)
Dept: CARDIOLOGY | Facility: CLINIC | Age: 84
End: 2024-02-02
Attending: INTERNAL MEDICINE
Payer: MEDICARE

## 2024-02-02 VITALS
SYSTOLIC BLOOD PRESSURE: 96 MMHG | BODY MASS INDEX: 36.26 KG/M2 | HEART RATE: 75 BPM | WEIGHT: 267.7 LBS | DIASTOLIC BLOOD PRESSURE: 60 MMHG | HEIGHT: 72 IN

## 2024-02-02 DIAGNOSIS — I48.0 PAROXYSMAL ATRIAL FIBRILLATION (H): ICD-10-CM

## 2024-02-02 DIAGNOSIS — I77.810 ASCENDING AORTA DILATATION (H): ICD-10-CM

## 2024-02-02 DIAGNOSIS — E66.01 MORBID OBESITY (H): ICD-10-CM

## 2024-02-02 DIAGNOSIS — I50.32 CHRONIC DIASTOLIC HEART FAILURE (H): ICD-10-CM

## 2024-02-02 DIAGNOSIS — Z95.0 CARDIAC PACEMAKER IN SITU: ICD-10-CM

## 2024-02-02 DIAGNOSIS — I25.10 CORONARY ARTERY DISEASE INVOLVING NATIVE CORONARY ARTERY OF NATIVE HEART WITHOUT ANGINA PECTORIS: ICD-10-CM

## 2024-02-02 DIAGNOSIS — G47.33 OSA (OBSTRUCTIVE SLEEP APNEA): ICD-10-CM

## 2024-02-02 DIAGNOSIS — I77.810 AORTIC ROOT DILATATION (H): ICD-10-CM

## 2024-02-02 DIAGNOSIS — E78.5 HYPERLIPIDEMIA LDL GOAL <100: ICD-10-CM

## 2024-02-02 DIAGNOSIS — I10 ESSENTIAL HYPERTENSION: ICD-10-CM

## 2024-02-02 DIAGNOSIS — R60.0 BILATERAL LOWER EXTREMITY EDEMA: ICD-10-CM

## 2024-02-02 DIAGNOSIS — I50.30 HEART FAILURE WITH PRESERVED EJECTION FRACTION, UNSPECIFIED HF CHRONICITY (H): ICD-10-CM

## 2024-02-02 PROCEDURE — 99215 OFFICE O/P EST HI 40 MIN: CPT | Performed by: INTERNAL MEDICINE

## 2024-02-02 RX ORDER — ATORVASTATIN CALCIUM 80 MG/1
80 TABLET, FILM COATED ORAL EVERY EVENING
Qty: 90 TABLET | Refills: 4 | Status: SHIPPED | OUTPATIENT
Start: 2024-02-02

## 2024-02-02 NOTE — LETTER
2/2/2024    Anne Heath MD  [address] [facility]   [facility] [address]    RE: Erich Craven       Dear Colleague,     I had the pleasure of seeing Erich Craven in the [facility].  HPI and Plan:   It is my pleasure to see your patient Erich Craven in follow-up.  He is a very pleasant 83-year-old patient with a past history of coronary artery disease with stenting of the left anterior descending artery.  He also has a history of paroxysmal atrial fibrillation and essential hypertension.  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  He also has lymphedema of the lower extremities.    With that background in mind the patient is doing well.  Echocardiography which was performed 3 days ago shows that his ejection fraction is normal with an EF of 55 to 60% and he has only the mildest form of diastolic dysfunction which is grade 1 and which would be ubiquitous in 83-year-old patients anyway.  He has no significant valvular heart disease.  Based upon the new guidelines his aortic root is normal in dimension at 3.9 cm with the upper limit of normal being 4.0 and his ascending aorta is borderline dilated at 4.0 cm.    As I mentioned in previous communications, the patient is on an unusual diuretic program being on metolazone 2.5 mg twice a day furosemide 40 mg twice a day and spironolactone 25 mg/day.  What is interesting is that this works very well for him and he has entirely normal renal function and electrolytes.  3 days ago his BUN was 22.5 creatinine 1.14 and GFR 63 and his sodium was 140 and a potassium of 4.1 which is entirely normal.    With respect to his coronary artery disease and prior stenting of the left anterior descending artery, the patient has no symptoms of angina pectoris.  He is lipid profile is  excellent with an LDL of 50 HDL of 57 and triglycerides of 104 and a total cholesterol of 128.  Liver function tests were normal with an ALT of 23 indicating no hepatic toxicity.    He does carry a diagnosis of essential hypertension.  He tells me that in the morning time when he takes his medications his blood pressure is on the lower side and he is entirely asymptomatic with that but in the evening time the blood pressure rises into the 130s.    He has permanent pacemaker is functioning normally.  Greater than 99% of the time the patient is in sinus rhythm.  Less than 1% of the time he is in atrial fibrillation with heart rates up to 110.    He is electrocardiogram 2 weeks ago shows that he is in sinus rhythm.  Apart from having a prominent R wave in V1 his EKG is normal.  He was switched from warfarin to Xarelto for stroke prophylaxis and is having no bleeding issues with this.    Impression:  1.  Coronary artery disease and prior LAD stenting.  The patient is asymptomatic with respect to coronary artery disease.  2.  Excellent lipid profile well within secondary prevention guidelines.  3.  Diastolic congestive heart failure.  The patient appears to be euvolemic.  He is diuretic regimen which is quite unusual is working very well for him and he is entirely normal renal function and electrolytes.  4.  Normally functioning permanent pacemaker.  5.  99% of the time the patient is in sinus rhythm.  Less than 1% the time he is in atrial fibrillation with ventricular rates of around 110.  He is entirely asymptomatic with his atrial fibrillation.  Anticoagulated with Xarelto without bleeding complications  6.  Recent echocardiogram showing normal left ventricular size and systolic function with the mildest form of diastolic dysfunction which is grade 1.  No significant valvular heart disease.  No regional wall motion abnormalities.  7.  Moderate obesity.  8.  Mildly dilated ascending aorta and normal aortic root  dimensions.  These are essentially unchanged from previously.  9.  Normotensive.  Blood pressures tend to be lower in the morning when he takes his medications but he is asymptomatic with this and blood pressures are normal throughout the day when he checks it.    Plan:  1.  We will continue the patient on his present diuretic regimen which though unusual is working very well for him and he has normal renal function and electrolytes with this  2.  We will repeat his echocardiogram to follow his diastolic function and also he is aortic dimensions  3.  We will repeat his basic metabolic profile and lipid profile in 1 years time.  4.  The device clinic will follow his permanent pacemaker independently.    As always the patient has been told to contact me if he is any questions or any concerns.    Brent Portillo MD, FACC, FRCPI          Orders Placed This Encounter   Procedures    Basic metabolic panel    Lipid Profile    ALT    Follow-Up with Cardiology    Echocardiogram Complete       Orders Placed This Encounter   Medications    atorvastatin (LIPITOR) 80 MG tablet     Sig: Take 1 tablet (80 mg) by mouth every evening     Dispense:  90 tablet     Refill:  4       Medications Discontinued During This Encounter   Medication Reason    doxycycline hyclate (VIBRAMYCIN) 100 MG capsule Therapy completed (No AVS)    predniSONE (DELTASONE) 20 MG tablet Therapy completed (No AVS)    predniSONE (DELTASONE) 20 MG tablet Therapy completed (No AVS)    atorvastatin (LIPITOR) 80 MG tablet Reorder (No AVS)         Encounter Diagnoses   Name Primary?    Coronary artery disease involving native coronary artery of native heart without angina pectoris     Paroxysmal atrial fibrillation (H)     Essential hypertension     Heart failure with preserved ejection fraction, unspecified HF chronicity (H)     Cardiac pacemaker in situ     RIN (obstructive sleep apnea)     Morbid obesity (H)     Chronic diastolic heart failure (H)     Ascending  aorta dilatation (H24)     Aortic root dilatation (H24)     Bilateral lower extremity edema     Hyperlipidemia LDL goal <100        CURRENT MEDICATIONS:  Current Outpatient Medications   Medication Sig Dispense Refill    acetaminophen (TYLENOL) 500 MG tablet Take 1,000 mg by mouth 3 times daily      Acetylcarnitine HCl (ACETYL L-CARNITINE PO) Take 1 capsule by mouth daily      albuterol (PROAIR HFA/PROVENTIL HFA/VENTOLIN HFA) 108 (90 Base) MCG/ACT inhaler Inhale 2 puffs into the lungs 4 times daily as needed for shortness of breath or wheezing 18 g 3    aspirin (ASA) 81 MG chewable tablet Take 1 tablet by mouth daily      atorvastatin (LIPITOR) 80 MG tablet Take 1 tablet (80 mg) by mouth every evening 90 tablet 4    diltiazem ER COATED BEADS (CARDIZEM CD/CARTIA XT) 300 MG 24 hr capsule Take 1 capsule (300 mg) by mouth daily 90 capsule 0    ferrous sulfate (SLO-FE) 142 (45 Fe) MG CR tablet Take 1 tablet (142 mg) by mouth every other day 45 tablet 3    Fluticasone-Umeclidin-Vilanterol (TRELEGY ELLIPTA) 200-62.5-25 MCG/INH oral inhaler Inhale 1 puff into the lungs daily      furosemide (LASIX) 40 MG tablet Take 1 tablet (40 mg) by mouth 2 times daily 180 tablet 1    isosorbide mononitrate (IMDUR) 30 MG 24 hr tablet Take 1 tablet (30 mg) by mouth daily 90 tablet 3    levothyroxine (SYNTHROID/LEVOTHROID) 175 MCG tablet Take 1 tablet (175 mcg) by mouth daily 90 tablet 3    lisinopril (ZESTRIL) 40 MG tablet Take 0.5 tablets (20 mg) by mouth daily      metolazone (ZAROXOLYN) 2.5 MG tablet TAKE 1 TABLET(2.5 MG) BY MOUTH TWICE DAILY 180 tablet 2    Multiple vitamin TABS Take 1 tablet by mouth daily      polyethylene glycol (MIRALAX) 17 g packet Take 17 g by mouth daily 7 packet 0    potassium chloride ER (KLOR-CON M) 10 MEQ CR tablet Take 1 tablet (10 mEq) by mouth 2 times daily 180 tablet 1    predniSONE (DELTASONE) 10 MG tablet 4 tablets on day 1, 2, and 3, 3 tablets on day 4, 5 and 6, 2 tablets on day 7, 8 and 9, 1 tablet  on day 10, 11, and 12, and half tablet on day 13, 14, and 15 32 tablet 0    spironolactone (ALDACTONE) 25 MG tablet TAKE 1 TABLET(25 MG) BY MOUTH EVERY OTHER DAY 45 tablet 3    tamsulosin (FLOMAX) 0.4 MG capsule TAKE 1 CAPSULE(0.4 MG) BY MOUTH DAILY 90 capsule 2    TAURINE PO Take 1 capsule by mouth daily      venlafaxine (EFFEXOR) 75 MG tablet TAKE 1 TABLET(75 MG) BY MOUTH TWICE DAILY 180 tablet 3    XARELTO ANTICOAGULANT 20 MG TABS tablet TAKE 1 TABLET(20 MG) BY MOUTH DAILY WITH DINNER 30 tablet 11       ALLERGIES     Allergies   Allergen Reactions    No Known Allergies        PAST MEDICAL HISTORY:  Past Medical History:   Diagnosis Date    (HFpEF) heart failure with preserved ejection fraction (H)     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 Started in 2012    Hypothyroidism     Lymphedema     Mumps     Obesity, Class III, BMI 40-49.9 (morbid obesity) (H)     RIN (obstructive sleep apnea)     has refused CPAP    Pacemaker     Paroxysmal A-fib (H)     PE (pulmonary thromboembolism) (H)     Status post coronary angiogram 11/05/2019       PAST SURGICAL HISTORY:  Past Surgical History:   Procedure Laterality Date    cardiac stenting      COLONOSCOPY  2010    CV RIGHT HEART CATH MEASUREMENTS RECORDED N/A 11/05/2019    Procedure: Right Heart Cath;  Surgeon: Roberto Hernandez MD;  Location:  HEART CARDIAC CATH LAB    ENT SURGERY      tonsillectomy    EYE SURGERY  2012    IMPLANT PACEMAKER      INJECT NERVE BLOCK  SUPRASCAPULAR Bilateral 08/26/2021    Procedure: BLOCK, NERVE, SUPRASCAPULAR, Bilateral, under ultrasound guidance;  Surgeon: Swetha Allen MD;  Location: [facility] OR    INJECT NERVE BLOCK SUPRASCAPULAR Bilateral 09/23/2021    Procedure: bilateral suprascapular nerve block;  Surgeon: Swetha Allen MD;  Location: [facility] OR    INJECT NERVE BLOCK SUPRASCAPULAR Left 09/15/2022    Procedure: BLOCK, NERVE, SUPRASCAPULAR- left;  Surgeon: Swetha Allen MD;  Location: [facility] OR    INJECT NERVE BLOCK SUPRASCAPULAR Left 10/20/2022    Procedure: BLOCK, NERVE, SUPRASCAPULAR- left;  Surgeon: Swetha Allen MD;  Location: [facility] OR    INJECT NERVE BLOCK SUPRASCAPULAR Left 01/05/2023    Procedure: BLOCK, NERVE, SUPRASCAPULAR (left);  Surgeon: Swetha Allen MD;  Location: [facility] OR    NERVE BLOCK PERIPHERAL Bilateral 03/10/2022    Procedure: bilateral suprascapular nerve block;  Surgeon: Swetha Allen MD;  Location: [facility] OR    ORTHOPEDIC SURGERY Right 2012    knee replacement    REVERSE ARTHROPLASTY SHOULDER Left 02/21/2023    Procedure: ARTHROPLASTY, SHOULDER, TOTAL, REVERSE LEFT;  Surgeon: Katelyn Vasquez MD;  Location: [facility] OR    VASCULAR SURGERY  2022       FAMILY HISTORY:  Family History   Problem Relation Age of Onset    Brain Cancer Mother     Other Cancer Mother     Anxiety Disorder Mother     Cerebrovascular Disease Father     Hypertension Father     Obesity Father     Substance Abuse Son     Osteoporosis Sister     Thyroid Disease Sister     Anesthesia Reaction No family hx of     Thrombosis No family hx of        SOCIAL HISTORY:  Social History     Socioeconomic History    Marital status:      Spouse name: None    Number of children: 5    Years of education: None    Highest education level: None   Occupational History    Occupation: retired teacher at [employer]   Tobacco Use    Smoking status: Former     Packs/day: 0.50     Years: 30.00     Additional pack years: 0.00     Total pack years: 15.00      Types: Cigarettes     Start date: 6/15/1959     Quit date: 1989     Years since quittin.6    Smokeless tobacco: Never    Tobacco comments:     smoked from  to    Vaping Use    Vaping Use: Never used   Substance and Sexual Activity    Alcohol use: No     Comment: quit in ; recovering    Drug use: No    Sexual activity: Not Currently     Partners: Female   Other Topics Concern    Parent/sibling w/ CABG, MI or angioplasty before 65F 55M? No     Social Determinants of Health     Financial Resource Strain: Low Risk  (1/10/2024)    Financial Resource Strain     Within the past 12 months, have you or your family members you live with been unable to get utilities (heat, electricity) when it was really needed?: No   Food Insecurity: Low Risk  (1/10/2024)    Food Insecurity     Within the past 12 months, did you worry that your food would run out before you got money to buy more?: No     Within the past 12 months, did the food you bought just not last and you didn t have money to get more?: No   Transportation Needs: Low Risk  (1/10/2024)    Transportation Needs     Within the past 12 months, has lack of transportation kept you from medical appointments, getting your medicines, non-medical meetings or appointments, work, or from getting things that you need?: No   Housing Stability: Low Risk  (1/10/2024)    Housing Stability     Do you have housing? : Yes     Are you worried about losing your housing?: No       Review of Systems:  Skin:          Eyes:         ENT:         Respiratory:          Cardiovascular:         Gastroenterology:        Genitourinary:         Musculoskeletal:         Neurologic:         Psychiatric:         Heme/Lymph/Imm:         Endocrine:           Physical Exam:  Vitals: BP 96/60   Pulse 75   Ht 1.829 m (6')   Wt 121.4 kg (267 lb 11.2 oz)   BMI 36.31 kg/m      Constitutional:  cooperative, alert and oriented, well developed, well nourished, in no acute distress        Skin:   warm and dry to the touch venous stasis changes        Head:  no masses or lesions        Eyes:  pupils equal and round        Lymph:No Cervical lymphadenopathy present     ENT:  no pallor or cyanosis        Neck:  carotid pulses are full and equal bilaterally, JVP normal, no carotid bruit        Respiratory:  normal breath sounds, clear to auscultation, normal A-P diameter, normal symmetry, normal respiratory excursion, no use of accessory muscles         Cardiac: regular rhythm, normal S1/S2, no S3 or S4, apical impulse not displaced, no murmurs, gallops or rubs   distant heart sounds            pulses full and equal, no bruits auscultated                                        GI:    obese      Extremities and Muscular Skeletal:  no deformities, clubbing, cyanosis, erythema observed stasis pigmentation bilateral LE edema;trace;pitting          Neurological:  no gross motor deficits;affect appropriate        Psych:  Alert and Oriented x 3        CC  Brent Portillo MD  [address]  [place] [address]        Thank you for allowing me to participate in the care of your patient.      Sincerely,     Brent Portillo MD, MD     [facility]

## 2024-02-02 NOTE — PROGRESS NOTES
HPI and Plan:   It is my pleasure to see your patient Erich Craven in follow-up.  He is a very pleasant 83-year-old patient with a past history of coronary artery disease with stenting of the left anterior descending artery.  He also has a history of paroxysmal atrial fibrillation and essential hypertension.  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  He also has lymphedema of the lower extremities.    With that background in mind the patient is doing well.  Echocardiography which was performed 3 days ago shows that his ejection fraction is normal with an EF of 55 to 60% and he has only the mildest form of diastolic dysfunction which is grade 1 and which would be ubiquitous in 83-year-old patients anyway.  He has no significant valvular heart disease.  Based upon the new guidelines his aortic root is normal in dimension at 3.9 cm with the upper limit of normal being 4.0 and his ascending aorta is borderline dilated at 4.0 cm.    As I mentioned in previous communications, the patient is on an unusual diuretic program being on metolazone 2.5 mg twice a day furosemide 40 mg twice a day and spironolactone 25 mg/day.  What is interesting is that this works very well for him and he has entirely normal renal function and electrolytes.  3 days ago his BUN was 22.5 creatinine 1.14 and GFR 63 and his sodium was 140 and a potassium of 4.1 which is entirely normal.    With respect to his coronary artery disease and prior stenting of the left anterior descending artery, the patient has no symptoms of angina pectoris.  He is lipid profile is excellent with an LDL of 50 HDL of 57 and triglycerides of 104 and a total cholesterol of 128.  Liver function tests were normal with an ALT of 23 indicating no hepatic toxicity.    He does carry a diagnosis  of essential hypertension.  He tells me that in the morning time when he takes his medications his blood pressure is on the lower side and he is entirely asymptomatic with that but in the evening time the blood pressure rises into the 130s.    He has permanent pacemaker is functioning normally.  Greater than 99% of the time the patient is in sinus rhythm.  Less than 1% of the time he is in atrial fibrillation with heart rates up to 110.    He is electrocardiogram 2 weeks ago shows that he is in sinus rhythm.  Apart from having a prominent R wave in V1 his EKG is normal.  He was switched from warfarin to Xarelto for stroke prophylaxis and is having no bleeding issues with this.    Impression:  1.  Coronary artery disease and prior LAD stenting.  The patient is asymptomatic with respect to coronary artery disease.  2.  Excellent lipid profile well within secondary prevention guidelines.  3.  Diastolic congestive heart failure.  The patient appears to be euvolemic.  He is diuretic regimen which is quite unusual is working very well for him and he is entirely normal renal function and electrolytes.  4.  Normally functioning permanent pacemaker.  5.  99% of the time the patient is in sinus rhythm.  Less than 1% the time he is in atrial fibrillation with ventricular rates of around 110.  He is entirely asymptomatic with his atrial fibrillation.  Anticoagulated with Xarelto without bleeding complications  6.  Recent echocardiogram showing normal left ventricular size and systolic function with the mildest form of diastolic dysfunction which is grade 1.  No significant valvular heart disease.  No regional wall motion abnormalities.  7.  Moderate obesity.  8.  Mildly dilated ascending aorta and normal aortic root dimensions.  These are essentially unchanged from previously.  9.  Normotensive.  Blood pressures tend to be lower in the morning when he takes his medications but he is asymptomatic with this and blood pressures  are normal throughout the day when he checks it.    Plan:  1.  We will continue the patient on his present diuretic regimen which though unusual is working very well for him and he has normal renal function and electrolytes with this  2.  We will repeat his echocardiogram to follow his diastolic function and also he is aortic dimensions  3.  We will repeat his basic metabolic profile and lipid profile in 1 years time.  4.  The device clinic will follow his permanent pacemaker independently.    As always the patient has been told to contact me if he is any questions or any concerns.    Brent Portillo MD, FACC, FRCPI          Orders Placed This Encounter   Procedures    Basic metabolic panel    Lipid Profile    ALT    Follow-Up with Cardiology    Echocardiogram Complete       Orders Placed This Encounter   Medications    atorvastatin (LIPITOR) 80 MG tablet     Sig: Take 1 tablet (80 mg) by mouth every evening     Dispense:  90 tablet     Refill:  4       Medications Discontinued During This Encounter   Medication Reason    doxycycline hyclate (VIBRAMYCIN) 100 MG capsule Therapy completed (No AVS)    predniSONE (DELTASONE) 20 MG tablet Therapy completed (No AVS)    predniSONE (DELTASONE) 20 MG tablet Therapy completed (No AVS)    atorvastatin (LIPITOR) 80 MG tablet Reorder (No AVS)         Encounter Diagnoses   Name Primary?    Coronary artery disease involving native coronary artery of native heart without angina pectoris     Paroxysmal atrial fibrillation (H)     Essential hypertension     Heart failure with preserved ejection fraction, unspecified HF chronicity (H)     Cardiac pacemaker in situ     RIN (obstructive sleep apnea)     Morbid obesity (H)     Chronic diastolic heart failure (H)     Ascending aorta dilatation (H24)     Aortic root dilatation (H24)     Bilateral lower extremity edema     Hyperlipidemia LDL goal <100        CURRENT MEDICATIONS:  Current Outpatient Medications   Medication Sig Dispense  Refill    acetaminophen (TYLENOL) 500 MG tablet Take 1,000 mg by mouth 3 times daily      Acetylcarnitine HCl (ACETYL L-CARNITINE PO) Take 1 capsule by mouth daily      albuterol (PROAIR HFA/PROVENTIL HFA/VENTOLIN HFA) 108 (90 Base) MCG/ACT inhaler Inhale 2 puffs into the lungs 4 times daily as needed for shortness of breath or wheezing 18 g 3    aspirin (ASA) 81 MG chewable tablet Take 1 tablet by mouth daily      atorvastatin (LIPITOR) 80 MG tablet Take 1 tablet (80 mg) by mouth every evening 90 tablet 4    diltiazem ER COATED BEADS (CARDIZEM CD/CARTIA XT) 300 MG 24 hr capsule Take 1 capsule (300 mg) by mouth daily 90 capsule 0    ferrous sulfate (SLO-FE) 142 (45 Fe) MG CR tablet Take 1 tablet (142 mg) by mouth every other day 45 tablet 3    Fluticasone-Umeclidin-Vilanterol (TRELEGY ELLIPTA) 200-62.5-25 MCG/INH oral inhaler Inhale 1 puff into the lungs daily      furosemide (LASIX) 40 MG tablet Take 1 tablet (40 mg) by mouth 2 times daily 180 tablet 1    isosorbide mononitrate (IMDUR) 30 MG 24 hr tablet Take 1 tablet (30 mg) by mouth daily 90 tablet 3    levothyroxine (SYNTHROID/LEVOTHROID) 175 MCG tablet Take 1 tablet (175 mcg) by mouth daily 90 tablet 3    lisinopril (ZESTRIL) 40 MG tablet Take 0.5 tablets (20 mg) by mouth daily      metolazone (ZAROXOLYN) 2.5 MG tablet TAKE 1 TABLET(2.5 MG) BY MOUTH TWICE DAILY 180 tablet 2    Multiple vitamin TABS Take 1 tablet by mouth daily      polyethylene glycol (MIRALAX) 17 g packet Take 17 g by mouth daily 7 packet 0    potassium chloride ER (KLOR-CON M) 10 MEQ CR tablet Take 1 tablet (10 mEq) by mouth 2 times daily 180 tablet 1    predniSONE (DELTASONE) 10 MG tablet 4 tablets on day 1, 2, and 3, 3 tablets on day 4, 5 and 6, 2 tablets on day 7, 8 and 9, 1 tablet on day 10, 11, and 12, and half tablet on day 13, 14, and 15 32 tablet 0    spironolactone (ALDACTONE) 25 MG tablet TAKE 1 TABLET(25 MG) BY MOUTH EVERY OTHER DAY 45 tablet 3    tamsulosin (FLOMAX) 0.4 MG capsule  TAKE 1 CAPSULE(0.4 MG) BY MOUTH DAILY 90 capsule 2    TAURINE PO Take 1 capsule by mouth daily      venlafaxine (EFFEXOR) 75 MG tablet TAKE 1 TABLET(75 MG) BY MOUTH TWICE DAILY 180 tablet 3    XARELTO ANTICOAGULANT 20 MG TABS tablet TAKE 1 TABLET(20 MG) BY MOUTH DAILY WITH DINNER 30 tablet 11       ALLERGIES     Allergies   Allergen Reactions    No Known Allergies        PAST MEDICAL HISTORY:  Past Medical History:   Diagnosis Date    (HFpEF) heart failure with preserved ejection fraction (H)     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 Started in 2012    Hypothyroidism     Lymphedema     Mumps     Obesity, Class III, BMI 40-49.9 (morbid obesity) (H)     RIN (obstructive sleep apnea)     has refused CPAP    Pacemaker     Paroxysmal A-fib (H)     PE (pulmonary thromboembolism) (H)     Status post coronary angiogram 11/05/2019       PAST SURGICAL HISTORY:  Past Surgical History:   Procedure Laterality Date    cardiac stenting      COLONOSCOPY  2010    CV RIGHT HEART CATH MEASUREMENTS RECORDED N/A 11/05/2019    Procedure: Right Heart Cath;  Surgeon: Roberto Hernandez MD;  Location: [facility] CARDIAC CATH LAB    ENT SURGERY      tonsillectomy    EYE SURGERY  2012    IMPLANT PACEMAKER      INJECT NERVE BLOCK SUPRASCAPULAR Bilateral 08/26/2021    Procedure: BLOCK, NERVE, SUPRASCAPULAR, Bilateral, under ultrasound guidance;  Surgeon: Swetha Allen MD;  Location: [facility] OR    INJECT NERVE BLOCK SUPRASCAPULAR Bilateral 09/23/2021     Procedure: bilateral suprascapular nerve block;  Surgeon: Swetha Allen MD;  Location: [facility] OR    INJECT NERVE BLOCK SUPRASCAPULAR Left 09/15/2022    Procedure: BLOCK, NERVE, SUPRASCAPULAR- left;  Surgeon: Swetha Allen MD;  Location: [facility] OR    INJECT NERVE BLOCK SUPRASCAPULAR Left 10/20/2022    Procedure: BLOCK, NERVE, SUPRASCAPULAR- left;  Surgeon: Swetha Allen MD;  Location: [facility] OR    INJECT NERVE BLOCK SUPRASCAPULAR Left 2023    Procedure: BLOCK, NERVE, SUPRASCAPULAR (left);  Surgeon: Swetha Allen MD;  Location: [facility] OR    NERVE BLOCK PERIPHERAL Bilateral 03/10/2022    Procedure: bilateral suprascapular nerve block;  Surgeon: Swetha Allen MD;  Location: [facility] OR    ORTHOPEDIC SURGERY Right     knee replacement    REVERSE ARTHROPLASTY SHOULDER Left 2023    Procedure: ARTHROPLASTY, SHOULDER, TOTAL, REVERSE LEFT;  Surgeon: Katelyn Vasquez MD;  Location: [facility] OR    VASCULAR SURGERY         FAMILY HISTORY:  Family History   Problem Relation Age of Onset    Brain Cancer Mother     Other Cancer Mother     Anxiety Disorder Mother     Cerebrovascular Disease Father     Hypertension Father     Obesity Father     Substance Abuse Son     Osteoporosis Sister     Thyroid Disease Sister     Anesthesia Reaction No family hx of     Thrombosis No family hx of        SOCIAL HISTORY:  Social History     Socioeconomic History    Marital status:      Spouse name: None    Number of children: 5    Years of education: None    Highest education level: None   Occupational History    Occupation: retired teacher at [employer]   Tobacco Use    Smoking status: Former     Packs/day: 0.50     Years: 30.00     Additional pack years: 0.00     Total pack years: 15.00     Types: Cigarettes     Start date: 6/15/1959     Quit date: 1989     Years since quittin.6    Smokeless tobacco: Never    Tobacco comments:     smoked from  to    Vaping Use    Vaping Use: Never used    Substance and Sexual Activity    Alcohol use: 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; recovering    Drug use: No    Sexual activity: Not Currently     Partners: Female   Other Topics Concern    Parent/sibling w/ CABG, MI or angioplasty before 65F 55M? No     Social Determinants of Health     Financial Resource Strain: Low Risk  (1/10/2024)    Financial Resource Strain     Within the past 12 months, have you or your family members you live with been unable to get utilities (heat, electricity) when it was really needed?: No   Food Insecurity: Low Risk  (1/10/2024)    Food Insecurity     Within the past 12 months, did you worry that your food would run out before you got money to buy more?: No     Within the past 12 months, did the food you bought just not last and you didn t have money to get more?: No   Transportation Needs: Low Risk  (1/10/2024)    Transportation Needs     Within the past 12 months, has lack of transportation kept you from medical appointments, getting your medicines, non-medical meetings or appointments, work, or from getting things that you need?: No   Housing Stability: Low Risk  (1/10/2024)    Housing Stability     Do you have housing? : Yes     Are you worried about losing your housing?: No       Review of Systems:  Skin:          Eyes:         ENT:         Respiratory:          Cardiovascular:         Gastroenterology:        Genitourinary:         Musculoskeletal:         Neurologic:         Psychiatric:         Heme/Lymph/Imm:         Endocrine:           Physical Exam:  Vitals: BP 96/60   Pulse 75   Ht 1.829 m (6')   Wt 121.4 kg (267 lb 11.2 oz)   BMI 36.31 kg/m      Constitutional:  cooperative, alert and oriented, well developed, well nourished, in no acute distress        Skin:  warm and dry to the touch venous stasis changes        Head:  no masses or lesions        Eyes:  pupils equal and round        Lymph:No Cervical lymphadenopathy present     ENT:  no pallor or cyanosis        Neck:   carotid pulses are full and equal bilaterally, JVP normal, no carotid bruit        Respiratory:  normal breath sounds, clear to auscultation, normal A-P diameter, normal symmetry, normal respiratory excursion, no use of accessory muscles         Cardiac: regular rhythm, normal S1/S2, no S3 or S4, apical impulse not displaced, no murmurs, gallops or rubs   distant heart sounds            pulses full and equal, no bruits auscultated                                        GI:    obese      Extremities and Muscular Skeletal:  no deformities, clubbing, cyanosis, erythema observed stasis pigmentation bilateral LE edema;trace;pitting          Neurological:  no gross motor deficits;affect appropriate        Psych:  Alert and Oriented x 3        CC  Brent Portillo MD  [id number]  [place] [id number]

## 2024-02-03 ENCOUNTER — NURSE TRIAGE (OUTPATIENT)
Dept: NURSING | Facility: CLINIC | Age: 84
End: 2024-02-03
Payer: MEDICARE

## 2024-02-03 NOTE — TELEPHONE ENCOUNTER
Jaun reports URI symptoms that started when he woke this morning.  Recently on Doxycycline - Woke today with new onset symptoms    - Cough  - Sore throat - Hoarse  - Runny nose, mild  - Facial pressure  - Headache  - Nauseated    Negative Covid test    SpO2 - 90-91% usually runs 93-94%  HR increased - 90-95; has a Pacemaker  BP = 135/75  Temp = 100 orally    Hx of COPD, CAD, CHF    Advised to see PCP within 24 hours - will go to [facility]  Care Advice reviewed    Beth Moncada RN  [facility]      Reason for Disposition   [1] Sinus pain (not just congestion) AND [2] fever   [1] Known COPD or other severe lung disease (i.e., bronchiectasis, cystic fibrosis, lung surgery) AND [2] worsening symptoms (i.e., increased sputum purulence or amount, increased breathing difficulty    Additional Information   Negative: SEVERE difficulty breathing (e.g., struggling for each breath, speaks in single words)   Negative: Sounds like a life-threatening emergency to the triager   Negative: Fever > 104 F (40 C)   Negative: Patient sounds very sick or weak to the triager   Negative: [1] Fever > 101 F (38.3 C) AND [2] age > 60 years   Negative: [1] Fever > 100.0 F (37.8 C) AND [2] bedridden (e.g., CVA, chronic illness, recovering from surgery)   Negative: [1] Fever > 100.0 F (37.8 C) AND [2] diabetes mellitus or weak immune system (e.g., HIV positive, cancer chemo, splenectomy, organ transplant, chronic steroids)   Negative: SEVERE difficulty breathing (e.g., struggling for each breath, speaks in single words)   Negative: Bluish (or gray) lips or face now   Negative: [1] Difficulty breathing AND [2] exposure to flames, smoke, or fumes   Negative: [1] Stridor AND [2] difficulty breathing   Negative: Sounds like a life-threatening emergency to the triager   Negative: [1] MODERATE difficulty breathing (e.g., speaks in phrases, SOB even at rest, pulse 100-120) AND [2] still present when not coughing    Negative: Chest pain  (Exception: MILD central chest pain, present only when coughing.)   Negative: Fever present > 3 days (72 hours)   Negative: [1] Fever returns after gone for over 24 hours AND [2] symptoms worse or not improved   Negative: Patient sounds very sick or weak to the triager   Negative: [1] MILD difficulty breathing (e.g., minimal/no SOB at rest, SOB with walking, pulse <100) AND [2] still present when not coughing   Negative: [1] Coughed up blood AND [2] > 1 tablespoon (15 ml)   (Exception: Blood-tinged sputum.)   Negative: Fever > 103 F (39.4 C)   Negative: [1] Fever > 101 F (38.3 C) AND [2] age > 60 years   Negative: [1] Fever > 100.0 F (37.8 C) AND [2] bedridden (e.g., CVA, chronic illness, recovering from surgery)   Negative: [1] Fever > 100.0 F (37.8 C) AND [2] diabetes mellitus or weak immune system (e.g., HIV positive, cancer chemo, splenectomy, organ transplant, chronic steroids)   Negative: Wheezing is present   Negative: SEVERE coughing spells (e.g., whooping sound after coughing, vomiting after coughing)   Negative: [1] Continuous (nonstop) coughing interferes with work or school AND [2] no improvement using cough treatment per Care Advice   Negative: Coughing up nick-colored (reddish-brown) sputum   Negative: Fever present > 3 days (72 hours)   Negative: [1] Fever returns after gone for over 24 hours AND [2] symptoms worse or not improved   Negative: [1] Using nasal washes and pain medicine > 24 hours AND [2] sinus pain (around cheekbone or eye) persists   Negative: Earache    Protocols used: Common Cold-A-AH, Cough - Acute [id number]-U-XT

## 2024-02-04 ENCOUNTER — OFFICE VISIT (OUTPATIENT)
Dept: URGENT CARE | Facility: URGENT CARE | Age: 84
End: 2024-02-04
Payer: MEDICARE

## 2024-02-04 VITALS
SYSTOLIC BLOOD PRESSURE: 117 MMHG | DIASTOLIC BLOOD PRESSURE: 66 MMHG | BODY MASS INDEX: 35.61 KG/M2 | TEMPERATURE: 98.9 F | OXYGEN SATURATION: 94 % | RESPIRATION RATE: 18 BRPM | HEART RATE: 75 BPM | WEIGHT: 262.6 LBS

## 2024-02-04 DIAGNOSIS — J10.1 INFLUENZA A: ICD-10-CM

## 2024-02-04 DIAGNOSIS — R07.0 THROAT PAIN: Primary | ICD-10-CM

## 2024-02-04 LAB
DEPRECATED S PYO AG THROAT QL EIA: NEGATIVE
FLUAV AG SPEC QL IA: POSITIVE
FLUBV AG SPEC QL IA: NEGATIVE
GROUP A STREP BY PCR: NOT DETECTED

## 2024-02-04 PROCEDURE — 87651 STREP A DNA AMP PROBE: CPT | Performed by: FAMILY MEDICINE

## 2024-02-04 PROCEDURE — 87804 INFLUENZA ASSAY W/OPTIC: CPT

## 2024-02-04 PROCEDURE — 99213 OFFICE O/P EST LOW 20 MIN: CPT | Performed by: FAMILY MEDICINE

## 2024-02-04 RX ORDER — OSELTAMIVIR PHOSPHATE 75 MG/1
75 CAPSULE ORAL 2 TIMES DAILY
Qty: 10 CAPSULE | Refills: 0 | Status: SHIPPED | OUTPATIENT
Start: 2024-02-04 | End: 2024-02-09

## 2024-02-04 NOTE — PROGRESS NOTES
ASSESSMENT/  PLAN:  Throat pain     - Streptococcus A Rapid Screen w/Reflex to PCR - Clinic Collect  - Influenza A & B Antigen - Clinic Collect  - Group A Streptococcus PCR Throat Swab    Influenza A     - oseltamivir (TAMIFLU) 75 MG capsule; Take 1 capsule (75 mg) by mouth 2 times daily for 5 days       We discussed the limitations of antiviral therapy against influenza, that treatment should usually be initiated within 2 days of the start of symptoms and is most critical for persons with weak immunity and chronic respiratory illnesses    Symptomatic therapy suggested:  Rest, drink lots of fluids,  Acetaminophen / ibuprofen for body aches and fever,  Salt water gargles for sore throat,  OTC anesthetic lozenges for sore throat,  OTC cough medications.   Call or return to clinic prn if these symptoms worsen or fail to improve as anticipated.     Advised that influenza illness usually lasts a week, sometimes more and that the patient should avoid contact with others, and cover the mouth when coughing to limit spread of the infection.         -----------------------------------------------------------------------------------------------------------------------    SUBJECTIVE:   Chief Complaint   Patient presents with    Cough     Cough, fever, sore throat, wheezing and SOB for the last few days     Erich Craven is a 83 year old male who present complaining of flu-like symptoms: fevers, chills, myalgias, headache,  congestion, sore throat and cough for 2 days. Denies dyspnea or wheezing.      Past Medical History:   Diagnosis Date    (HFpEF) heart failure with preserved ejection fraction (H)     Alcohol dependence in remission (H)     in recovery since 1989    Arthritis     BPH with urinary obstruction     CAD (coronary artery disease)     PCI w/ SUMMER in 2014    Cardiac pacemaker in situ     Chronic diastolic heart failure (H)     Complication of anesthesia     post-op DVT 2012 after knee surgery    COPD  (chronic obstructive pulmonary disease) (H)     Diabetes (H)     DVT (deep venous thrombosis) (H)     Dyspnea     Essential hypertension     Fatigue     HLD (hyperlipidemia)     Hx of long term use of blood thinners     Due to PE. Started in 2012    Hypothyroidism     Lymphedema     Mumps     Obesity, Class III, BMI 40-49.9 (morbid obesity) (H)     RIN (obstructive sleep apnea)     has refused CPAP    Pacemaker     Paroxysmal A-fib (H)     PE (pulmonary thromboembolism) (H)     Status post coronary angiogram 11/05/2019     Patient Active Problem List   Diagnosis    Morbid obesity (H)    PE (pulmonary thromboembolism) (H)    HTN (hypertension)    Chronic diastolic heart failure (H)    Dyspnea    Fatigue    RIN (obstructive sleep apnea)    COPD (chronic obstructive pulmonary disease) (H)    Hypothyroidism    Paroxysmal A-fib (H)    HLD (hyperlipidemia)    Lymphedema    (HFpEF) heart failure with preserved ejection fraction (H)    Essential hypertension    Cardiac pacemaker in situ    DVT (deep venous thrombosis) (H)    Coronary artery disease involving native coronary artery with angina pectoris, unspecified whether native or transplanted heart (H24)    Obesity, Class III, BMI 40-49.9 (morbid obesity) (H)    Muscle tension headache    Heart failure with preserved ejection fraction, unspecified HF chronicity (H)    Status post coronary angiogram    Anxiety    Deep vein thrombosis (DVT) of distal vein of lower extremity, unspecified chronicity, unspecified laterality (H)    Type 2 diabetes mellitus with stage 2 chronic kidney disease, without long-term current use of insulin (H)    Chronic kidney disease, stage 3 (H)    Primary osteoarthritis of right shoulder    Chronic right shoulder pain    Long term current use of anticoagulants with INR goal of 2.0-3.0    Chronic left shoulder pain    Ankle injury, right, initial encounter    Hypokalemia    Alcohol dependence in remission (H)       ALLERGIES:  No known  allergies    MEDs  acetaminophen (TYLENOL) 500 MG tablet, Take 1,000 mg by mouth 3 times daily  Acetylcarnitine HCl (ACETYL L-CARNITINE PO), Take 1 capsule by mouth daily  albuterol (PROAIR HFA/PROVENTIL HFA/VENTOLIN HFA) 108 (90 Base) MCG/ACT inhaler, Inhale 2 puffs into the lungs 4 times daily as needed for shortness of breath or wheezing  aspirin (ASA) 81 MG chewable tablet, Take 1 tablet by mouth daily  atorvastatin (LIPITOR) 80 MG tablet, Take 1 tablet (80 mg) by mouth every evening  diltiazem ER COATED BEADS (CARDIZEM CD/CARTIA XT) 300 MG 24 hr capsule, Take 1 capsule (300 mg) by mouth daily  ferrous sulfate (SLO-FE) 142 (45 Fe) MG CR tablet, Take 1 tablet (142 mg) by mouth every other day  Fluticasone-Umeclidin-Vilanterol (TRELEGY ELLIPTA) 200-62.5-25 MCG/INH oral inhaler, Inhale 1 puff into the lungs daily  furosemide (LASIX) 40 MG tablet, Take 1 tablet (40 mg) by mouth 2 times daily  isosorbide mononitrate (IMDUR) 30 MG 24 hr tablet, Take 1 tablet (30 mg) by mouth daily  levothyroxine (SYNTHROID/LEVOTHROID) 175 MCG tablet, Take 1 tablet (175 mcg) by mouth daily  lisinopril (ZESTRIL) 40 MG tablet, Take 0.5 tablets (20 mg) by mouth daily  metolazone (ZAROXOLYN) 2.5 MG tablet, TAKE 1 TABLET(2.5 MG) BY MOUTH TWICE DAILY  Multiple vitamin TABS, Take 1 tablet by mouth daily  polyethylene glycol (MIRALAX) 17 g packet, Take 17 g by mouth daily  potassium chloride ER (KLOR-CON M) 10 MEQ CR tablet, Take 1 tablet (10 mEq) by mouth 2 times daily  spironolactone (ALDACTONE) 25 MG tablet, TAKE 1 TABLET(25 MG) BY MOUTH EVERY OTHER DAY  tamsulosin (FLOMAX) 0.4 MG capsule, TAKE 1 CAPSULE(0.4 MG) BY MOUTH DAILY  TAURINE PO, Take 1 capsule by mouth daily  venlafaxine (EFFEXOR) 75 MG tablet, TAKE 1 TABLET(75 MG) BY MOUTH TWICE DAILY  XARELTO ANTICOAGULANT 20 MG TABS tablet, TAKE 1 TABLET(20 MG) BY MOUTH DAILY WITH DINNER  predniSONE (DELTASONE) 10 MG tablet, 4 tablets on day 1, 2, and 3, 3 tablets on day 4, 5 and 6, 2 tablets  on day 7, 8 and 9, 1 tablet on day 10, 11, and 12, and half tablet on day 13, 14, and 15 (Patient not taking: Reported on 2024)    No current facility-administered medications on file prior to visit.      Social History     Tobacco Use    Smoking status: Former     Packs/day: 0.50     Years: 30.00     Additional pack years: 0.00     Total pack years: 15.00     Types: Cigarettes     Start date: 6/15/1959     Quit date: 1989     Years since quittin.6    Smokeless tobacco: Never    Tobacco comments:     smoked from  to    Substance Use Topics    Alcohol use: No     Comment: quit in ; recovering       Family History   Problem Relation Age of Onset    Brain Cancer Mother     Other Cancer Mother     Anxiety Disorder Mother     Cerebrovascular Disease Father     Hypertension Father     Obesity Father     Substance Abuse Son     Osteoporosis Sister     Thyroid Disease Sister     Anesthesia Reaction No family hx of     Thrombosis No family hx of          ROS:  CONSTITUTIONAL: low grade fever, chills,    INTEGUMENTARY/SKIN: NEGATIVE for worrisome rashes,  or lesions  EYES: NEGATIVE for vision changes or irritation  ENT/MOUTH: NEGATIVE for ear, mouth and throat problems          OBJECTIVE:      /66 (BP Location: Left arm, Patient Position: Sitting, Cuff Size: Adult Regular)   Pulse 75   Temp 98.9  F (37.2  C) (Oral)   Resp 18   Wt 119.1 kg (262 lb 9.6 oz)   SpO2 94%   BMI 35.61 kg/m      GENERAL APPEARANCE: alert, moderate distress, cooperative,  fatigued, no cough  EYES: EOMI,  PERRL, conjunctiva clear  HENT: ear canals and TM's normal.  Nose and mouth without ulcers, erythema or lesions.  No sinus tenderness  NECK: supple, nontender, no lymphadenopathy  RESP: lungs clear to auscultation - no rales, rhonchi or wheezes  CV: regular rates and rhythm, normal S1 S2, no murmur noted  NEURO: Normal strength and tone, sensory exam grossly normal,  normal speech and mentation  SKIN: no suspicious  lesions or rashes  Results for orders placed or performed in visit on 02/04/24   Streptococcus A Rapid Screen w/Reflex to PCR - Clinic Collect     Status: Normal    Specimen: Throat; Swab   Result Value Ref Range    Group A Strep antigen Negative Negative   Influenza A & B Antigen - Clinic Collect     Status: Abnormal    Specimen: Nose; Swab   Result Value Ref Range    Influenza A antigen Positive (A) Negative    Influenza B antigen Negative Negative    Narrative    Test results must be correlated with clinical data. If necessary, results should be confirmed by a molecular assay or viral culture.

## 2024-02-07 ENCOUNTER — APPOINTMENT (OUTPATIENT)
Dept: GENERAL RADIOLOGY | Facility: CLINIC | Age: 84
DRG: 193 | End: 2024-02-07
Attending: EMERGENCY MEDICINE
Payer: MEDICARE

## 2024-02-07 ENCOUNTER — HOSPITAL ENCOUNTER (INPATIENT)
Facility: CLINIC | Age: 84
LOS: 2 days | Discharge: HOME | DRG: 193 | End: 2024-02-09
Attending: EMERGENCY MEDICINE | Admitting: STUDENT IN AN ORGANIZED HEALTH CARE EDUCATION/TRAINING PROGRAM
Payer: MEDICARE

## 2024-02-07 DIAGNOSIS — R09.81 CONGESTION OF PARANASAL SINUS: ICD-10-CM

## 2024-02-07 DIAGNOSIS — R05.1 ACUTE COUGH: ICD-10-CM

## 2024-02-07 DIAGNOSIS — J15.9 BACTERIAL PNEUMONIA: ICD-10-CM

## 2024-02-07 DIAGNOSIS — R09.02 HYPOXIA: ICD-10-CM

## 2024-02-07 DIAGNOSIS — J18.9 PNEUMONIA OF LEFT LUNG DUE TO INFECTIOUS ORGANISM, UNSPECIFIED PART OF LUNG: ICD-10-CM

## 2024-02-07 DIAGNOSIS — E83.39 HYPOPHOSPHATEMIA: Primary | ICD-10-CM

## 2024-02-07 LAB
ALBUMIN SERPL BCG-MCNC: 4.2 G/DL (ref 3.5–5.2)
ALP SERPL-CCNC: 115 U/L (ref 40–150)
ALT SERPL W P-5'-P-CCNC: 20 U/L (ref 0–70)
ANION GAP SERPL CALCULATED.3IONS-SCNC: 10 MMOL/L (ref 7–15)
AST SERPL W P-5'-P-CCNC: 21 U/L (ref 0–45)
ATRIAL RATE - MUSE: 90 BPM
BASOPHILS # BLD AUTO: 0 10E3/UL (ref 0–0.2)
BASOPHILS NFR BLD AUTO: 0 %
BILIRUB SERPL-MCNC: 0.7 MG/DL
BUN SERPL-MCNC: 31.5 MG/DL (ref 8–23)
CALCIUM SERPL-MCNC: 9.3 MG/DL (ref 8.8–10.2)
CHLORIDE SERPL-SCNC: 93 MMOL/L (ref 98–107)
CREAT SERPL-MCNC: 1.28 MG/DL (ref 0.67–1.17)
DEPRECATED HCO3 PLAS-SCNC: 32 MMOL/L (ref 22–29)
DIASTOLIC BLOOD PRESSURE - MUSE: NORMAL MMHG
EGFRCR SERPLBLD CKD-EPI 2021: 55 ML/MIN/1.73M2
EOSINOPHIL # BLD AUTO: 0 10E3/UL (ref 0–0.7)
EOSINOPHIL NFR BLD AUTO: 0 %
ERYTHROCYTE [DISTWIDTH] IN BLOOD BY AUTOMATED COUNT: 15.8 % (ref 10–15)
GLUCOSE SERPL-MCNC: 115 MG/DL (ref 70–99)
HCO3 BLDV-SCNC: 32 MMOL/L (ref 21–28)
HCT VFR BLD AUTO: 38 % (ref 40–53)
HGB BLD-MCNC: 12.5 G/DL (ref 13.3–17.7)
IMM GRANULOCYTES # BLD: 0.1 10E3/UL
IMM GRANULOCYTES NFR BLD: 1 %
INTERPRETATION ECG - MUSE: NORMAL
LACTATE BLD-SCNC: 1.1 MMOL/L
LYMPHOCYTES # BLD AUTO: 1.2 10E3/UL (ref 0.8–5.3)
LYMPHOCYTES NFR BLD AUTO: 9 %
MAGNESIUM SERPL-MCNC: 2.1 MG/DL (ref 1.7–2.3)
MCH RBC QN AUTO: 32.1 PG (ref 26.5–33)
MCHC RBC AUTO-ENTMCNC: 32.9 G/DL (ref 31.5–36.5)
MCV RBC AUTO: 98 FL (ref 78–100)
MONOCYTES # BLD AUTO: 0.8 10E3/UL (ref 0–1.3)
MONOCYTES NFR BLD AUTO: 7 %
NEUTROPHILS # BLD AUTO: 10.8 10E3/UL (ref 1.6–8.3)
NEUTROPHILS NFR BLD AUTO: 83 %
NRBC # BLD AUTO: 0 10E3/UL
NRBC BLD AUTO-RTO: 0 /100
NT-PROBNP SERPL-MCNC: 38 PG/ML (ref 0–1800)
P AXIS - MUSE: 43 DEGREES
PCO2 BLDV: 48 MM HG (ref 40–50)
PH BLDV: 7.43 [PH] (ref 7.32–7.43)
PHOSPHATE SERPL-MCNC: 2.1 MG/DL (ref 2.5–4.5)
PLATELET # BLD AUTO: 157 10E3/UL (ref 150–450)
PO2 BLDV: 29 MM HG (ref 25–47)
POTASSIUM SERPL-SCNC: 3.1 MMOL/L (ref 3.4–5.3)
POTASSIUM SERPL-SCNC: 3.2 MMOL/L (ref 3.4–5.3)
PR INTERVAL - MUSE: 194 MS
PROCALCITONIN SERPL IA-MCNC: 0.09 NG/ML
PROT SERPL-MCNC: 7.1 G/DL (ref 6.4–8.3)
QRS DURATION - MUSE: 106 MS
QT - MUSE: 352 MS
QTC - MUSE: 430 MS
R AXIS - MUSE: 46 DEGREES
RBC # BLD AUTO: 3.89 10E6/UL (ref 4.4–5.9)
SAO2 % BLDV: 57 % (ref 70–75)
SARS-COV-2 RNA RESP QL NAA+PROBE: NEGATIVE
SODIUM SERPL-SCNC: 135 MMOL/L (ref 135–145)
SYSTOLIC BLOOD PRESSURE - MUSE: NORMAL MMHG
T AXIS - MUSE: 14 DEGREES
TROPONIN T SERPL HS-MCNC: 45 NG/L
TROPONIN T SERPL HS-MCNC: 50 NG/L
VENTRICULAR RATE- MUSE: 90 BPM
WBC # BLD AUTO: 12.9 10E3/UL (ref 4–11)

## 2024-02-07 PROCEDURE — 96365 THER/PROPH/DIAG IV INF INIT: CPT

## 2024-02-07 PROCEDURE — 36415 COLL VENOUS BLD VENIPUNCTURE: CPT | Performed by: EMERGENCY MEDICINE

## 2024-02-07 PROCEDURE — 87635 SARS-COV-2 COVID-19 AMP PRB: CPT | Performed by: NURSE PRACTITIONER

## 2024-02-07 PROCEDURE — 85025 COMPLETE CBC W/AUTO DIFF WBC: CPT | Performed by: EMERGENCY MEDICINE

## 2024-02-07 PROCEDURE — 84484 ASSAY OF TROPONIN QUANT: CPT | Performed by: EMERGENCY MEDICINE

## 2024-02-07 PROCEDURE — 250N000013 HC RX MED GY IP 250 OP 250 PS 637: Performed by: STUDENT IN AN ORGANIZED HEALTH CARE EDUCATION/TRAINING PROGRAM

## 2024-02-07 PROCEDURE — 84132 ASSAY OF SERUM POTASSIUM: CPT | Performed by: STUDENT IN AN ORGANIZED HEALTH CARE EDUCATION/TRAINING PROGRAM

## 2024-02-07 PROCEDURE — 250N000013 HC RX MED GY IP 250 OP 250 PS 637: Performed by: NURSE PRACTITIONER

## 2024-02-07 PROCEDURE — 96375 TX/PRO/DX INJ NEW DRUG ADDON: CPT

## 2024-02-07 PROCEDURE — 84484 ASSAY OF TROPONIN QUANT: CPT | Performed by: NURSE PRACTITIONER

## 2024-02-07 PROCEDURE — 93005 ELECTROCARDIOGRAM TRACING: CPT

## 2024-02-07 PROCEDURE — 84100 ASSAY OF PHOSPHORUS: CPT | Performed by: STUDENT IN AN ORGANIZED HEALTH CARE EDUCATION/TRAINING PROGRAM

## 2024-02-07 PROCEDURE — 99223 1ST HOSP IP/OBS HIGH 75: CPT | Mod: AI | Performed by: NURSE PRACTITIONER

## 2024-02-07 PROCEDURE — 250N000011 HC RX IP 250 OP 636: Performed by: EMERGENCY MEDICINE

## 2024-02-07 PROCEDURE — 250N000011 HC RX IP 250 OP 636: Performed by: NURSE PRACTITIONER

## 2024-02-07 PROCEDURE — 84145 PROCALCITONIN (PCT): CPT | Performed by: EMERGENCY MEDICINE

## 2024-02-07 PROCEDURE — 82803 BLOOD GASES ANY COMBINATION: CPT

## 2024-02-07 PROCEDURE — 83880 ASSAY OF NATRIURETIC PEPTIDE: CPT | Performed by: EMERGENCY MEDICINE

## 2024-02-07 PROCEDURE — 36415 COLL VENOUS BLD VENIPUNCTURE: CPT | Performed by: STUDENT IN AN ORGANIZED HEALTH CARE EDUCATION/TRAINING PROGRAM

## 2024-02-07 PROCEDURE — 87040 BLOOD CULTURE FOR BACTERIA: CPT | Performed by: EMERGENCY MEDICINE

## 2024-02-07 PROCEDURE — 99285 EMERGENCY DEPT VISIT HI MDM: CPT | Mod: 25

## 2024-02-07 PROCEDURE — 71046 X-RAY EXAM CHEST 2 VIEWS: CPT

## 2024-02-07 PROCEDURE — 83735 ASSAY OF MAGNESIUM: CPT | Performed by: EMERGENCY MEDICINE

## 2024-02-07 PROCEDURE — 82247 BILIRUBIN TOTAL: CPT | Performed by: EMERGENCY MEDICINE

## 2024-02-07 PROCEDURE — 120N000001 HC R&B MED SURG/OB

## 2024-02-07 PROCEDURE — 36415 COLL VENOUS BLD VENIPUNCTURE: CPT | Performed by: NURSE PRACTITIONER

## 2024-02-07 RX ORDER — CALCIUM CARBONATE 500 MG/1
1000 TABLET, CHEWABLE ORAL 4 TIMES DAILY PRN
Status: DISCONTINUED | OUTPATIENT
Start: 2024-02-07 | End: 2024-02-09 | Stop reason: HOSPADM

## 2024-02-07 RX ORDER — BISACODYL 10 MG
10 SUPPOSITORY, RECTAL RECTAL DAILY PRN
Status: DISCONTINUED | OUTPATIENT
Start: 2024-02-07 | End: 2024-02-09 | Stop reason: HOSPADM

## 2024-02-07 RX ORDER — PROCHLORPERAZINE MALEATE 5 MG
5 TABLET ORAL EVERY 6 HOURS PRN
Status: DISCONTINUED | OUTPATIENT
Start: 2024-02-07 | End: 2024-02-09 | Stop reason: HOSPADM

## 2024-02-07 RX ORDER — CEFTRIAXONE 1 G/1
1 INJECTION, POWDER, FOR SOLUTION INTRAMUSCULAR; INTRAVENOUS EVERY 24 HOURS
Status: DISCONTINUED | OUTPATIENT
Start: 2024-02-08 | End: 2024-02-08

## 2024-02-07 RX ORDER — ACETAMINOPHEN 650 MG/1
650 SUPPOSITORY RECTAL EVERY 4 HOURS PRN
Status: DISCONTINUED | OUTPATIENT
Start: 2024-02-07 | End: 2024-02-09 | Stop reason: HOSPADM

## 2024-02-07 RX ORDER — ALBUTEROL SULFATE 90 UG/1
2 AEROSOL, METERED RESPIRATORY (INHALATION) 4 TIMES DAILY PRN
Status: DISCONTINUED | OUTPATIENT
Start: 2024-02-07 | End: 2024-02-09 | Stop reason: HOSPADM

## 2024-02-07 RX ORDER — AZITHROMYCIN 500 MG/1
500 INJECTION, POWDER, LYOPHILIZED, FOR SOLUTION INTRAVENOUS ONCE
Status: COMPLETED | OUTPATIENT
Start: 2024-02-07 | End: 2024-02-07

## 2024-02-07 RX ORDER — METOLAZONE 2.5 MG/1
2.5 TABLET ORAL
Status: DISCONTINUED | OUTPATIENT
Start: 2024-02-07 | End: 2024-02-09 | Stop reason: HOSPADM

## 2024-02-07 RX ORDER — FUROSEMIDE 40 MG
40 TABLET ORAL
Status: DISCONTINUED | OUTPATIENT
Start: 2024-02-07 | End: 2024-02-09 | Stop reason: HOSPADM

## 2024-02-07 RX ORDER — AZITHROMYCIN 500 MG/1
500 INJECTION, POWDER, LYOPHILIZED, FOR SOLUTION INTRAVENOUS EVERY 24 HOURS
Status: DISCONTINUED | OUTPATIENT
Start: 2024-02-08 | End: 2024-02-09 | Stop reason: HOSPADM

## 2024-02-07 RX ORDER — OXYCODONE HYDROCHLORIDE 5 MG/1
5 TABLET ORAL EVERY 4 HOURS PRN
Status: DISCONTINUED | OUTPATIENT
Start: 2024-02-07 | End: 2024-02-09 | Stop reason: HOSPADM

## 2024-02-07 RX ORDER — ONDANSETRON 4 MG/1
4 TABLET, ORALLY DISINTEGRATING ORAL EVERY 6 HOURS PRN
Status: DISCONTINUED | OUTPATIENT
Start: 2024-02-07 | End: 2024-02-09 | Stop reason: HOSPADM

## 2024-02-07 RX ORDER — HYDROMORPHONE HCL IN WATER/PF 6 MG/30 ML
0.4 PATIENT CONTROLLED ANALGESIA SYRINGE INTRAVENOUS
Status: DISCONTINUED | OUTPATIENT
Start: 2024-02-07 | End: 2024-02-09 | Stop reason: HOSPADM

## 2024-02-07 RX ORDER — ISOSORBIDE MONONITRATE 30 MG/1
30 TABLET, EXTENDED RELEASE ORAL DAILY
Status: DISCONTINUED | OUTPATIENT
Start: 2024-02-08 | End: 2024-02-09 | Stop reason: HOSPADM

## 2024-02-07 RX ORDER — HYDROMORPHONE HCL IN WATER/PF 6 MG/30 ML
0.2 PATIENT CONTROLLED ANALGESIA SYRINGE INTRAVENOUS
Status: DISCONTINUED | OUTPATIENT
Start: 2024-02-07 | End: 2024-02-09 | Stop reason: HOSPADM

## 2024-02-07 RX ORDER — LIDOCAINE 40 MG/G
CREAM TOPICAL
Status: DISCONTINUED | OUTPATIENT
Start: 2024-02-07 | End: 2024-02-09 | Stop reason: HOSPADM

## 2024-02-07 RX ORDER — ACETAMINOPHEN 325 MG/1
650 TABLET ORAL EVERY 4 HOURS PRN
Status: DISCONTINUED | OUTPATIENT
Start: 2024-02-07 | End: 2024-02-09 | Stop reason: HOSPADM

## 2024-02-07 RX ORDER — ONDANSETRON 2 MG/ML
4 INJECTION INTRAMUSCULAR; INTRAVENOUS EVERY 6 HOURS PRN
Status: DISCONTINUED | OUTPATIENT
Start: 2024-02-07 | End: 2024-02-09 | Stop reason: HOSPADM

## 2024-02-07 RX ORDER — OSELTAMIVIR PHOSPHATE 75 MG/1
75 CAPSULE ORAL 2 TIMES DAILY
Status: DISCONTINUED | OUTPATIENT
Start: 2024-02-07 | End: 2024-02-09 | Stop reason: HOSPADM

## 2024-02-07 RX ORDER — FERROUS SULFATE 300 MG/5ML
142 LIQUID (ML) ORAL EVERY OTHER DAY
Status: DISCONTINUED | OUTPATIENT
Start: 2024-02-09 | End: 2024-02-09 | Stop reason: HOSPADM

## 2024-02-07 RX ORDER — TAMSULOSIN HYDROCHLORIDE 0.4 MG/1
0.4 CAPSULE ORAL DAILY
Status: DISCONTINUED | OUTPATIENT
Start: 2024-02-08 | End: 2024-02-09 | Stop reason: HOSPADM

## 2024-02-07 RX ORDER — SPIRONOLACTONE 25 MG/1
25 TABLET ORAL
Status: DISCONTINUED | OUTPATIENT
Start: 2024-02-09 | End: 2024-02-09 | Stop reason: HOSPADM

## 2024-02-07 RX ORDER — HYDRALAZINE HYDROCHLORIDE 10 MG/1
10 TABLET, FILM COATED ORAL EVERY 4 HOURS PRN
Status: DISCONTINUED | OUTPATIENT
Start: 2024-02-07 | End: 2024-02-09 | Stop reason: HOSPADM

## 2024-02-07 RX ORDER — POLYETHYLENE GLYCOL 3350 17 G/17G
17 POWDER, FOR SOLUTION ORAL 2 TIMES DAILY PRN
Status: DISCONTINUED | OUTPATIENT
Start: 2024-02-07 | End: 2024-02-09 | Stop reason: HOSPADM

## 2024-02-07 RX ORDER — AMOXICILLIN 250 MG
1 CAPSULE ORAL 2 TIMES DAILY PRN
Status: DISCONTINUED | OUTPATIENT
Start: 2024-02-07 | End: 2024-02-09 | Stop reason: HOSPADM

## 2024-02-07 RX ORDER — CEFTRIAXONE 1 G/1
1 INJECTION, POWDER, FOR SOLUTION INTRAMUSCULAR; INTRAVENOUS ONCE
Status: COMPLETED | OUTPATIENT
Start: 2024-02-07 | End: 2024-02-07

## 2024-02-07 RX ORDER — VENLAFAXINE 75 MG/1
75 TABLET ORAL 2 TIMES DAILY
Status: DISCONTINUED | OUTPATIENT
Start: 2024-02-07 | End: 2024-02-09 | Stop reason: HOSPADM

## 2024-02-07 RX ORDER — FLUTICASONE FUROATE AND VILANTEROL 200; 25 UG/1; UG/1
1 POWDER RESPIRATORY (INHALATION) DAILY
Status: DISCONTINUED | OUTPATIENT
Start: 2024-02-08 | End: 2024-02-09 | Stop reason: HOSPADM

## 2024-02-07 RX ORDER — HYDRALAZINE HYDROCHLORIDE 20 MG/ML
10 INJECTION INTRAMUSCULAR; INTRAVENOUS EVERY 4 HOURS PRN
Status: DISCONTINUED | OUTPATIENT
Start: 2024-02-07 | End: 2024-02-09 | Stop reason: HOSPADM

## 2024-02-07 RX ORDER — AMOXICILLIN 250 MG
2 CAPSULE ORAL 2 TIMES DAILY PRN
Status: DISCONTINUED | OUTPATIENT
Start: 2024-02-07 | End: 2024-02-09 | Stop reason: HOSPADM

## 2024-02-07 RX ORDER — PROCHLORPERAZINE 25 MG
12.5 SUPPOSITORY, RECTAL RECTAL EVERY 12 HOURS PRN
Status: DISCONTINUED | OUTPATIENT
Start: 2024-02-07 | End: 2024-02-09 | Stop reason: HOSPADM

## 2024-02-07 RX ORDER — ATORVASTATIN CALCIUM 80 MG/1
80 TABLET, FILM COATED ORAL EVERY EVENING
Status: DISCONTINUED | OUTPATIENT
Start: 2024-02-07 | End: 2024-02-09 | Stop reason: HOSPADM

## 2024-02-07 RX ORDER — POTASSIUM CHLORIDE 750 MG/1
40 TABLET, EXTENDED RELEASE ORAL ONCE
Status: COMPLETED | OUTPATIENT
Start: 2024-02-07 | End: 2024-02-07

## 2024-02-07 RX ORDER — ASPIRIN 81 MG/1
81 TABLET, CHEWABLE ORAL DAILY
Status: DISCONTINUED | OUTPATIENT
Start: 2024-02-08 | End: 2024-02-09 | Stop reason: HOSPADM

## 2024-02-07 RX ORDER — LISINOPRIL 20 MG/1
20 TABLET ORAL DAILY
Status: DISCONTINUED | OUTPATIENT
Start: 2024-02-08 | End: 2024-02-09 | Stop reason: HOSPADM

## 2024-02-07 RX ADMIN — ACETAMINOPHEN 650 MG: 325 TABLET, FILM COATED ORAL at 18:02

## 2024-02-07 RX ADMIN — POTASSIUM CHLORIDE 40 MEQ: 750 TABLET, EXTENDED RELEASE ORAL at 16:42

## 2024-02-07 RX ADMIN — ATORVASTATIN CALCIUM 80 MG: 80 TABLET, FILM COATED ORAL at 20:31

## 2024-02-07 RX ADMIN — FUROSEMIDE 40 MG: 40 TABLET ORAL at 20:31

## 2024-02-07 RX ADMIN — METOLAZONE 2.5 MG: 2.5 TABLET ORAL at 21:52

## 2024-02-07 RX ADMIN — OSELTAMIVIR PHOSPHATE 75 MG: 75 CAPSULE ORAL at 21:52

## 2024-02-07 RX ADMIN — POTASSIUM & SODIUM PHOSPHATES POWDER PACK 280-160-250 MG 1 PACKET: 280-160-250 PACK at 21:51

## 2024-02-07 RX ADMIN — VENLAFAXINE HYDROCHLORIDE 75 MG: 75 TABLET ORAL at 21:52

## 2024-02-07 RX ADMIN — AZITHROMYCIN MONOHYDRATE 500 MG: 500 INJECTION, POWDER, LYOPHILIZED, FOR SOLUTION INTRAVENOUS at 12:43

## 2024-02-07 RX ADMIN — ACETAMINOPHEN 650 MG: 325 TABLET, FILM COATED ORAL at 13:50

## 2024-02-07 RX ADMIN — ONDANSETRON 4 MG: 4 TABLET, ORALLY DISINTEGRATING ORAL at 18:10

## 2024-02-07 RX ADMIN — CEFTRIAXONE SODIUM 1 G: 1 INJECTION, POWDER, FOR SOLUTION INTRAMUSCULAR; INTRAVENOUS at 12:04

## 2024-02-07 RX ADMIN — POTASSIUM & SODIUM PHOSPHATES POWDER PACK 280-160-250 MG 1 PACKET: 280-160-250 PACK at 18:02

## 2024-02-07 ASSESSMENT — ACTIVITIES OF DAILY LIVING (ADL)
ADLS_ACUITY_SCORE: 38
ADLS_ACUITY_SCORE: 38
ADLS_ACUITY_SCORE: 41
ADLS_ACUITY_SCORE: 41
ADLS_ACUITY_SCORE: 38
ADLS_ACUITY_SCORE: 41
ADLS_ACUITY_SCORE: 38

## 2024-02-07 NOTE — PHARMACY-ADMISSION MEDICATION HISTORY
Pharmacist Admission Medication History    Admission medication history is complete. The information provided in this note is only as accurate as the sources available at the time of the update.    Information Source(s): Patient, Clinic records, and CareEverywhere/SureScripts via in-person    Pertinent Information: Patient was very sick and uncomfortable at time of interview.     Changes made to PTA medication list:  Added: None  Deleted: Prednisone  Changed: None    Allergies reviewed with patient and updates made in EHR: unable to assess    Medication History Completed By: Vega Wilkins [facility] 2/7/2024 1:39 PM    Prior to Admission medications    Medication Sig Last Dose Taking? Auth Provider Long Term End Date   acetaminophen (TYLENOL) 500 MG tablet Take 1,000 mg by mouth 3 times daily 2/7/2024 Yes Reported, Patient No    Acetylcarnitine HCl (ACETYL L-CARNITINE PO) Take 1 capsule by mouth daily 2/7/2024 Yes Reported, Patient     albuterol (PROAIR HFA/PROVENTIL HFA/VENTOLIN HFA) 108 (90 Base) MCG/ACT inhaler Inhale 2 puffs into the lungs 4 times daily as needed for shortness of breath or wheezing 2/6/2024 Yes Anne Heath MD Yes    aspirin (ASA) 81 MG chewable tablet Take 1 tablet by mouth daily 2/7/2024 Yes Reported, Patient     atorvastatin (LIPITOR) 80 MG tablet Take 1 tablet (80 mg) by mouth every evening 2/6/2024 Yes Brent Portillo MD Yes    diltiazem ER COATED BEADS (CARDIZEM CD/CARTIA XT) 300 MG 24 hr capsule Take 1 capsule (300 mg) by mouth daily 2/7/2024 Yes Anne Heath MD Yes    ferrous sulfate (SLO-FE) 142 (45 Fe) MG CR tablet Take 1 tablet (142 mg) by mouth every other day 2/7/2024 Yes Anne Heath MD     Fluticasone-Umeclidin-Vilanterol (TRELEGY ELLIPTA) 200-62.5-25 MCG/INH oral inhaler Inhale 1 puff into the lungs daily 2/7/2024 Yes Reported, Patient     furosemide (LASIX) 40 MG tablet Take 1 tablet (40 mg) by mouth 2 times daily 2/7/2024 Yes Anne Heath  MD Yes    isosorbide mononitrate (IMDUR) 30 MG 24 hr tablet Take 1 tablet (30 mg) by mouth daily 2/7/2024 Yes Anne Heath MD Yes    levothyroxine (SYNTHROID/LEVOTHROID) 175 MCG tablet Take 1 tablet (175 mcg) by mouth daily 2/7/2024 Yes Erich Duarte PA-C Yes    lisinopril (ZESTRIL) 40 MG tablet Take 0.5 tablets (20 mg) by mouth daily 2/7/2024 Yes Tiffany Suero, NP Yes    metolazone (ZAROXOLYN) 2.5 MG tablet TAKE 1 TABLET(2.5 MG) BY MOUTH TWICE DAILY 2/7/2024 Yes Anne Heath MD Yes    Multiple vitamin TABS Take 1 tablet by mouth daily 2/7/2024 Yes Reported, Patient     oseltamivir (TAMIFLU) 75 MG capsule Take 1 capsule (75 mg) by mouth 2 times daily for 5 days 2/7/2024 Yes Anastacia Britt MD  2/9/24   polyethylene glycol (MIRALAX) 17 g packet Take 17 g by mouth daily  Patient taking differently: Take 1 packet by mouth daily as needed Unknown at prn Yes Latha Dent, APRN CNS No    potassium chloride ER (KLOR-CON M) 10 MEQ CR tablet Take 1 tablet (10 mEq) by mouth 2 times daily 2/7/2024 Yes Anne Heath MD     spironolactone (ALDACTONE) 25 MG tablet TAKE 1 TABLET(25 MG) BY MOUTH EVERY OTHER DAY 2/7/2024 Yes Shauna Reardon MD Yes    tamsulosin (FLOMAX) 0.4 MG capsule TAKE 1 CAPSULE(0.4 MG) BY MOUTH DAILY 2/7/2024 Yes Anne Heath MD     TAURINE PO Take 1 capsule by mouth daily 2/7/2024 Yes Reported, Patient     venlafaxine (EFFEXOR) 75 MG tablet TAKE 1 TABLET(75 MG) BY MOUTH TWICE DAILY 2/7/2024 Yes Anne Heath MD Yes    XARELTO ANTICOAGULANT 20 MG TABS tablet TAKE 1 TABLET(20 MG) BY MOUTH DAILY WITH DINNER 2/6/2024 Yes Monty Pardo MD Yes

## 2024-02-07 NOTE — ED PROVIDER NOTES
History     Chief Complaint:  Respiratory Problems       The history is provided by the patient.      Erich Craven is a 83 year old male, anticoagulated on Xarelto, with a history of COPD with inhaler use, HFpEF, atrial fibrillation, CAD, RIN, and PE who presents with low oxygen saturation. The patient reports that he had onset of flu symptoms 5 days ago, with a 100.5 F fever the next night, diagnosed as Influenza A 3 days ago at urgent care. He was prescribed Tamiflu and told to come in to the ED if his oxygen stats were consistently low. Today, Jaun states that he measured his O2 saturation to be 85% at home, prompting him to come in. He additionally notes a dry cough, some palpitations, and loss of appetite. Jaun states he did use his inhaler today, with incomplete relief.     Independent Historian:    None - patient only    Review of External Notes:  Reviewed urgent care notes patient seen for sore throat diagnosed with influenza A on February 4, 2024.  Discharged with Tamiflu    Medications:    Albuterol   Aspirin 81 mg  Lipitor  Diltiazem  Lasix  Imdur  Levothyroxine  Lisinopril  Metolazone  Miralax  Prednisone  Spironolactone  Flomax  Effexor  Xarelto    Past Medical History:    HFpEF  Alcohol dependence in remission  Atrial fibrillation   Arthritis  BPH with urinary obstruction  CAD   Cardiac pacemaker in situ  Chronic diastolic heart failure  COPD   Diabetes  DVT  Dyspnea  Hypertension  Hyperlipidemia   Hypothyroidism  Lymphedema  Mumps  Obesity  RIN   PE    Past Surgical History:    Cardiac stenting  Colonoscopy  Right heart cath  Tonsillectomy  Eye surgery  Implant pacemaker  Inject nerve block suprascapular x 5  Nerve block peripheral  Knee replacement surgery  Reverse arthroplasty, shoulder  Vascular surgery    Physical Exam   Patient Vitals for the past 24 hrs:   BP Temp Temp src Pulse Resp SpO2 Height Weight   02/07/24 1209 -- -- -- 97 24 92 % -- --   02/07/24 1200 -- -- -- 102 -- (!) 86 % -- --    02/07/24 1058 -- -- -- -- -- 95 % -- --   02/07/24 1043 132/64 -- -- 91 -- 92 % -- --   02/07/24 1041 132/64 -- -- 91 12 -- -- --   02/07/24 0910 138/63 98.9  F (37.2  C) Oral 98 16 91 % 1.829 m (6') 119.3 kg (263 lb)        Physical Exam  Vitals reviewed.   Constitutional:       General: He is not in acute distress.     Appearance: He is not ill-appearing.   HENT:      Head: Normocephalic and atraumatic.   Eyes:      Extraocular Movements: Extraocular movements intact.   Cardiovascular:      Rate and Rhythm: Normal rate and regular rhythm.   Pulmonary:      Effort: Pulmonary effort is normal. No respiratory distress.      Breath sounds: Rales present. No wheezing.   Abdominal:      Palpations: Abdomen is soft.      Tenderness: There is no abdominal tenderness. There is no guarding.   Musculoskeletal:      Cervical back: Normal range of motion.   Skin:     General: Skin is warm and dry.   Neurological:      Mental Status: He is alert and oriented to person, place, and time.      GCS: GCS eye subscore is 4. GCS verbal subscore is 5. GCS motor subscore is 6.   Psychiatric:         Behavior: Behavior normal.           Emergency Department Course   ECG  ECG taken at 1052, ECG read at 1053  Normal sinus rhythm  Cannot rule out anterior infarct, age undetermined  T wave abnormality, consider inferior ischemia  Abnormal ECG   Rate 90 bpm. IA interval 194 ms. QRS duration 106 ms. QT/QTc 352/430 ms. P-R-T axes 43 46 14.    Imaging:  XR Chest 2 Views   Final Result   IMPRESSION: Increased left lower lobe patchy opacities, suspicious for   pneumonia. Consider follow-up radiograph after treatment to ensure   resolution. No pleural effusion or pneumothorax. Normal heart size.   Pacemaker. Shoulder arthroplasties.      ONEIDA NUNEZ MD            SYSTEM ID:  [id number]        Report per radiology    Laboratory:  Labs Ordered and Resulted from Time of ED Arrival to Time of ED Departure   COMPREHENSIVE METABOLIC PANEL - Abnormal        Result Value    Sodium 135      Potassium 3.1 (*)     Carbon Dioxide (CO2) 32 (*)     Anion Gap 10      Urea Nitrogen 31.5 (*)     Creatinine 1.28 (*)     GFR Estimate 55 (*)     Calcium 9.3      Chloride 93 (*)     Glucose 115 (*)     Alkaline Phosphatase 115      AST 21      ALT 20      Protein Total 7.1      Albumin 4.2      Bilirubin Total 0.7     TROPONIN T, HIGH SENSITIVITY - Abnormal    Troponin T, High Sensitivity 50 (*)    CBC WITH PLATELETS AND DIFFERENTIAL - Abnormal    WBC Count 12.9 (*)     RBC Count 3.89 (*)     Hemoglobin 12.5 (*)     Hematocrit 38.0 (*)     MCV 98      MCH 32.1      MCHC 32.9      RDW 15.8 (*)     Platelet Count 157      % Neutrophils 83      % Lymphocytes 9      % Monocytes 7      % Eosinophils 0      % Basophils 0      % Immature Granulocytes 1      NRBCs per 100 WBC 0      Absolute Neutrophils 10.8 (*)     Absolute Lymphocytes 1.2      Absolute Monocytes 0.8      Absolute Eosinophils 0.0      Absolute Basophils 0.0      Absolute Immature Granulocytes 0.1      Absolute NRBCs 0.0     ISTAT GASES LACTATE VENOUS POCT - Abnormal    Lactic Acid POCT 1.1      Bicarbonate Venous POCT 32 (*)     O2 Sat, Venous POCT 57 (*)     pCO2 Venous POCT 48      pH Venous POCT 7.43      pO2 Venous POCT 29     MAGNESIUM - Normal    Magnesium 2.1     PROCALCITONIN - Normal    Procalcitonin 0.09     NT PROBNP INPATIENT - Normal    N terminal Pro BNP Inpatient 38     BLOOD CULTURE   BLOOD CULTURE     Emergency Department Course & Assessments:    Interventions:  Medications   azithromycin (ZITHROMAX) 500 mg vial to attach to  mL bag (500 mg Intravenous $New Bag 2/7/24 1243)   cefTRIAXone (ROCEPHIN) 1 g vial to attach to  mL bag for ADULTS or NS 50 mL bag for PEDS (0 g Intravenous Stopped 2/7/24 1243)        Assessment/Consultations/Discussion of Management or Tests:  ED Course as of 02/07/24 1249   Wed Feb 07, 2024   1010 I obtained history and examined the patient as noted above.    1112  WBC(!): 12.9   1127 Pneumonia seen on x-ray to left lower lobe.   1146 I rechecked the patient and explained findings.    1154 Ambulatory pulse 88-89% ox.    1244 I spoke with Chepe HOLGUIN of the hospitalist team regarding the patient, who accepted the patient for admission.      Social Determinants of Health affecting care:  None     Disposition:  The patient was admitted to the hospital under the care of Dr. Garcia    Impression & Plan    CMS Diagnoses: None    Medical Decision Makin-year-old male presenting today with cough, increase shortness of breath.  Reports concern for hypoxia at home.  Recently diagnosed with influenza A.  He states he has been checking his pulse ox at home and noted that O2 saturation was in the 80s.  On initial presentation in triage O2 saturation was 96%.  Upon further O2 monitoring he desaturated to the mid 80s.  Required 2 L nasal cannula.  X-ray significant for left-sided pneumonia.  White count elevated at 12.9.  Patient started on Rocephin and azithromycin.  Discussed plan for admission with the patient.  He agrees with plan of care.  Hospitalist contacted for admission.    Diagnosis:    ICD-10-CM    1. Hypoxia  R09.02       2. Pneumonia of left lung due to infectious organism, unspecified part of lung  J18.9         Scribe Disclosure:  KAM, Rony Baron, am serving as a scribe at 10:20 AM on 2024 to document services personally performed by Tara Alonso DO, based on my observations and the provider's statements to me.  2024   Tara Alonso DO Doan, Tiffani, DO  24 1251

## 2024-02-07 NOTE — ED TRIAGE NOTES
Tested positive for Influenza A three days ago. Sats at home were in mid 80's at home, 91 % here. Little SOB. No CP.

## 2024-02-07 NOTE — ED NOTES
Patient walked with cane and PO2 monitor on him from room around corner to bathroom and returned back to his room.  SPO2 was a steady 89-90% room air, it dropped once to 88, and also went up to 91% room air.  Pt placed on O2 via nasal cannula at 2 liters

## 2024-02-07 NOTE — ED NOTES
[facility]  ED Nurse Handoff Report    ED Chief complaint: Respiratory Problems      ED Diagnosis:   Final diagnoses:   None       Code Status: To be addressed by admitting MD.     Allergies:   Allergies   Allergen Reactions    No Known Allergies        Patient Story:   Pt presents for SOB. Pt reports tested positive for Influenza A - increasing SOB with exertion  Focused Assessment:    SOB with exertion, hypoxic during ambulation at 89%, pt placed on 2L via NC   Alert and oriented - x4    Treatments and/or interventions provided:   Labs Ordered and Resulted from Time of ED Arrival to Time of ED Departure   COMPREHENSIVE METABOLIC PANEL - Abnormal       Result Value    Sodium 135      Potassium 3.1 (*)     Carbon Dioxide (CO2) 32 (*)     Anion Gap 10      Urea Nitrogen 31.5 (*)     Creatinine 1.28 (*)     GFR Estimate 55 (*)     Calcium 9.3      Chloride 93 (*)     Glucose 115 (*)     Alkaline Phosphatase 115      AST 21      ALT 20      Protein Total 7.1      Albumin 4.2      Bilirubin Total 0.7     TROPONIN T, HIGH SENSITIVITY - Abnormal    Troponin T, High Sensitivity 50 (*)    CBC WITH PLATELETS AND DIFFERENTIAL - Abnormal    WBC Count 12.9 (*)     RBC Count 3.89 (*)     Hemoglobin 12.5 (*)     Hematocrit 38.0 (*)     MCV 98      MCH 32.1      MCHC 32.9      RDW 15.8 (*)     Platelet Count 157      % Neutrophils 83      % Lymphocytes 9      % Monocytes 7      % Eosinophils 0      % Basophils 0      % Immature Granulocytes 1      NRBCs per 100 WBC 0      Absolute Neutrophils 10.8 (*)     Absolute Lymphocytes 1.2      Absolute Monocytes 0.8      Absolute Eosinophils 0.0      Absolute Basophils 0.0      Absolute Immature Granulocytes 0.1      Absolute NRBCs 0.0     ISTAT GASES LACTATE VENOUS POCT - Abnormal    Lactic Acid POCT 1.1      Bicarbonate Venous POCT 32 (*)     O2 Sat, Venous POCT 57 (*)     pCO2 Venous POCT 48      pH Venous POCT 7.43      pO2 Venous POCT 29     MAGNESIUM - Normal     Magnesium 2.1     PROCALCITONIN - Normal    Procalcitonin 0.09     NT PROBNP INPATIENT - Normal    N terminal Pro BNP Inpatient 38     BLOOD CULTURE   BLOOD CULTURE     XR Chest 2 Views   Final Result   IMPRESSION: Increased left lower lobe patchy opacities, suspicious for   pneumonia. Consider follow-up radiograph after treatment to ensure   resolution. No pleural effusion or pneumothorax. Normal heart size.   Pacemaker. Shoulder arthroplasties.      ONEIDA NUNEZ MD            SYSTEM ID:  [id number]          Patient's response to treatments and/or interventions: Tolerated well     To be done/followed up on inpatient unit:  Further evaluation with inpatient team.     Does this patient have any cognitive concerns?:  NA    Activity level - Baseline/Home:  Independent  Activity Level - Current:   Stand with assist x2    Patient's Preferred language: English   Needed?: No    Isolation: Droplet  Infection: Influenza  Patient tested for COVID 19 prior to admission: NO  Bariatric?: No    Vital Signs:   Vitals:    02/07/24 0910 02/07/24 1041 02/07/24 1043 02/07/24 1058   BP: 138/63 132/64 132/64    Pulse: 98 91 91    Resp: 16 12     Temp: 98.9  F (37.2  C)      TempSrc: Oral      SpO2: 91%  92% 95%   Weight: 119.3 kg (263 lb)      Height: 1.829 m (6')          Cardiac Rhythm:     Was the PSS-3 completed:   Yes  What interventions are required if any?               Family Comments: No family at bedside in ED.   OBS brochure/video discussed/provided to patient/family: N/A              Name of person given brochure if not patient: NA               Relationship to patient: NA     For the majority of the shift this patient's behavior was Green.   Behavioral interventions performed were frequent rounding in ED.    ED NURSE PHONE NUMBER:[phone]

## 2024-02-07 NOTE — H&P
[facility]    History and Physical - Hospitalist Service       Date of Admission:  2/7/2024    Assessment & Plan      Erich Craven is a 83 year old male admitted on 2/7/2024. 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  Patient was seen in local urgent care on Sunday where he tested positive for influenza A, he was started on Tamiflu. Work up in the emergency department including WBC 12.9, procal 0.09, lactic acid 1.1, troponin 50, BNP 38, CXR revealed increased left lower lobe patchy opacities.    Influenza A   Community acquired pneumonia  Hypoxia   Leukocytosis   *Tested positive for influenza A on 02/04/2024. Was started on Tamiflu. Tmax 100.5 with worsening cough and shortness of breath with SpO2 of 85% on RA at home on day of admission.   *Requiring 2-3L supplemental O2 in emergency department. Ambulatory pulse ox 88-89% on room air.   *CXR with increased left lower lobe patchy opacities. Started on azithromycin and rocephin.   *WBC 12.9, procal 0.09, lactic acid 1.1  *VBG 7.43 / 48 / 29 / 32  - COVID PCR pending   - Continue antibiotics for community acquired pneumonia   - Blood cultures pending   - Continue to wean supplemental O2   - Aggressive pulmonary hygiene     Hypertension   Chronic diastolic heart failure   *Most recent TTE 01/31/2024 with EF 55-60%  *BNP 38 on day of admission   *Bilateral lower extremity edema which patient states is slightly worse than his baseline lymphedema.   - Continue PTA diltiazem, lisinopril, lasix and spironolactone.   - Low-sodium, low-fat diet  - Daily I's &  O's  - Daily weight  - Trend BMP  - Given patient's history and increased lower extremity edema, could consider repeat TTE to rule out stress cardiomyopathy if O2 requirements worsen or do not improve.    COPD  *Lungs with rales bilaterally. Used albuterol inhaler PTA without relief.   - Continue PTA albuterol PRN and Trelegy     Paroxysmal atrial fibrillation, on Xarelto   *Patient on anticoagulation. Also has history of DVT and PE. Rate controlled on day of admission.   - Continue PTA diltiazem   - Monitor on tele    CAD  *No anginal symptoms, however initial troponin 50. EKG with no acute ischemic changes.   - Trend troponin  - Monitor on telemetry   - Continue PTA imdur and ASA    HLD  - Continue PTA statin    CKD stage 3  *Creatinine baseline at 1.28 on day of admission.   - Continue to monitor     Hypokalemia   *Potassium 3.1 on day of admission   - RN driven replacement protocol ordered  - Trend BMP  - Monitor on tele    Hypothyroidism   - Continue PTA Synthroid     Type 2 DM   *Does not take medication PTA   - Continue to monitor   Recent Labs   Lab Test 01/12/24  1155   A1C 6.0*     RIN  *CPAP at home; no home O2 use   - Continue CPAP         Diet: Combination Diet Regular Diet Adult    DVT Prophylaxis: DOAC  Reddy Catheter: Not present  Lines: None     Cardiac Monitoring: None  Code Status:  DNI    Clinically Significant Risk Factors Present on Admission        # Hypokalemia: Lowest K = 3.1 mmol/L in last 2 days, will replace as needed          # Drug Induced Coagulation Defect: home medication list includes an anticoagulant medication  # Drug Induced Platelet Defect: home medication list includes an antiplatelet medication   # Hypertension: Noted on problem list      # Obesity: Estimated body mass index is 35.67 kg/m  as calculated from the following:    Height as of this encounter: 1.829 m (6').    Weight as of this encounter: 119.3 kg (263 lb).        # Pacemaker present       Disposition Plan       Expected Discharge Date: 02/08/2024                The patient's care was discussed with the Attending Physician, Dr. Garcia, Bedside Nurse, and Patient.    [facility]  Hospitalist Service  [facility]  Securely message with [employer] (more info)  Text page via [facility] Paging/Directory     ______________________________________________________________________    Chief Complaint   Shortness of breath     History is obtained from the patient, electronic health record, and emergency department physician    History of Present Illness   Erich Craven is a 83 year old male admitted on 2/7/2024. 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  Patient was seen in local urgent care on Sunday where he tested positive for influenza A, he was started on Tamiflu. Work up in the emergency department including WBC 12.9, procal 0.09, lactic acid 1.1, troponin 50, BNP 38, CXR revealed increased left lower lobe patchy opacities.    Seen in the ED by Dr. Alonso. Work up as above.     Patient seen and examined in ED. He reports significant discomfort on hospital cart and feeling SOB. Currently on 2L SpO2 with sats 91-93%. He denies chest pain, but does report bilateral shoulder pain with coughing fits. He reports subject fever and associated chills, current temp 99.7. Tylenol administered by RN while I was at the bedside. No nausea or vomiting. Discussed the above plan with the patient who is in agreement. Discussed code status with the patient who elected to be a SPECIAL CODE. He would not like to be intubated,  however would like compressions and code drugs in the event of cardiac arrest.       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 Started in 2012    Hypothyroidism     Lymphedema     Mumps     Obesity, Class III, BMI 40-49.9 (morbid obesity) (H)     RIN (obstructive sleep apnea)     has refused CPAP    Pacemaker     Paroxysmal A-fib (H)     PE (pulmonary thromboembolism) (H)     Status post coronary angiogram 11/05/2019       Past Surgical History   Past Surgical History:   Procedure Laterality Date    cardiac stenting      COLONOSCOPY  2010    CV RIGHT HEART CATH MEASUREMENTS RECORDED N/A 11/05/2019    Procedure: Right Heart Cath;  Surgeon: Roberto Hernandez MD;  Location:  HEART CARDIAC CATH LAB    ENT SURGERY      tonsillectomy    EYE SURGERY  2012    IMPLANT PACEMAKER      INJECT NERVE BLOCK SUPRASCAPULAR Bilateral 08/26/2021    Procedure: BLOCK, NERVE, SUPRASCAPULAR, Bilateral, under ultrasound guidance;  Surgeon: Swetha Allen MD;  Location: [facility] OR    INJECT NERVE BLOCK SUPRASCAPULAR Bilateral 09/23/2021    Procedure: bilateral suprascapular nerve block;  Surgeon: wSetha Allen MD;  Location: [facility] OR    INJECT NERVE BLOCK SUPRASCAPULAR Left 09/15/2022    Procedure: BLOCK, NERVE, SUPRASCAPULAR- left;  Surgeon: Swetha Allen MD;  Location: [facility] OR    INJECT NERVE BLOCK SUPRASCAPULAR  Left 10/20/2022    Procedure: BLOCK, NERVE, SUPRASCAPULAR- left;  Surgeon: Swetha Allen MD;  Location: [facility] OR    INJECT NERVE BLOCK SUPRASCAPULAR Left 01/05/2023    Procedure: BLOCK, NERVE, SUPRASCAPULAR (left);  Surgeon: Swetha Allen MD;  Location: [facility] OR    NERVE BLOCK PERIPHERAL Bilateral 03/10/2022    Procedure: bilateral suprascapular nerve block;  Surgeon: Swetha Allen MD;  Location: [facility] OR    ORTHOPEDIC SURGERY Right 2012    knee replacement    REVERSE ARTHROPLASTY SHOULDER Left 02/21/2023    Procedure: ARTHROPLASTY, SHOULDER, TOTAL, REVERSE LEFT;  Surgeon: Katelyn Vasquez MD;  Location: [facility]    VASCULAR SURGERY  2022       Prior to Admission Medications   Prior to Admission Medications   Prescriptions Last Dose Informant Patient Reported? Taking?   Acetylcarnitine HCl (ACETYL L-CARNITINE PO) 2/7/2024  Yes Yes   Sig: Take 1 capsule by mouth daily   Fluticasone-Umeclidin-Vilanterol (TRELEGY ELLIPTA) 200-62.5-25 MCG/INH oral inhaler 2/7/2024  Yes Yes   Sig: Inhale 1 puff into the lungs daily   Multiple vitamin TABS 2/7/2024  Yes Yes   Sig: Take 1 tablet by mouth daily   TAURINE PO 2/7/2024  Yes Yes   Sig: Take 1 capsule by mouth daily   XARELTO ANTICOAGULANT 20 MG TABS tablet 2/6/2024  No Yes   Sig: TAKE 1 TABLET(20 MG) BY MOUTH DAILY WITH DINNER   acetaminophen (TYLENOL) 500 MG tablet 2/7/2024  Yes Yes   Sig: Take 1,000 mg by mouth 3 times daily   albuterol (PROAIR HFA/PROVENTIL HFA/VENTOLIN HFA) 108 (90 Base) MCG/ACT inhaler 2/6/2024  No Yes   Sig: Inhale 2 puffs into the lungs 4 times daily as needed for shortness of breath or wheezing   aspirin (ASA) 81 MG chewable tablet 2/7/2024  Yes Yes   Sig: Take 1 tablet by mouth daily   atorvastatin (LIPITOR) 80 MG tablet 2/6/2024  No Yes   Sig: Take 1 tablet (80 mg) by mouth every evening   diltiazem ER COATED BEADS (CARDIZEM CD/CARTIA XT) 300 MG 24 hr capsule 2/7/2024  No Yes   Sig: Take 1 capsule (300 mg) by mouth daily   ferrous sulfate  (SLO-FE) 142 (45 Fe) MG CR tablet 2024  No Yes   Sig: Take 1 tablet (142 mg) by mouth every other day   furosemide (LASIX) 40 MG tablet 2024  No Yes   Sig: Take 1 tablet (40 mg) by mouth 2 times daily   isosorbide mononitrate (IMDUR) 30 MG 24 hr tablet 2024  No Yes   Sig: Take 1 tablet (30 mg) by mouth daily   levothyroxine (SYNTHROID/LEVOTHROID) 175 MCG tablet 2024  No Yes   Sig: Take 1 tablet (175 mcg) by mouth daily   lisinopril (ZESTRIL) 40 MG tablet 2024  Yes Yes   Sig: Take 0.5 tablets (20 mg) by mouth daily   metolazone (ZAROXOLYN) 2.5 MG tablet 2024  No Yes   Sig: TAKE 1 TABLET(2.5 MG) BY MOUTH TWICE DAILY   oseltamivir (TAMIFLU) 75 MG capsule 2024  No Yes   Sig: Take 1 capsule (75 mg) by mouth 2 times daily for 5 days   polyethylene glycol (MIRALAX) 17 g packet Unknown at prn  No Yes   Sig: Take 17 g by mouth daily   Patient taking differently: Take 1 packet by mouth daily as needed   potassium chloride ER (KLOR-CON M) 10 MEQ CR tablet 2024  No Yes   Sig: Take 1 tablet (10 mEq) by mouth 2 times daily   spironolactone (ALDACTONE) 25 MG tablet 2024  No Yes   Sig: TAKE 1 TABLET(25 MG) BY MOUTH EVERY OTHER DAY   tamsulosin (FLOMAX) 0.4 MG capsule 2024  No Yes   Sig: TAKE 1 CAPSULE(0.4 MG) BY MOUTH DAILY   venlafaxine (EFFEXOR) 75 MG tablet 2024  No Yes   Sig: TAKE 1 TABLET(75 MG) BY MOUTH TWICE DAILY      Facility-Administered Medications: None        Review of Systems    The 10 point Review of Systems is negative other than noted in the HPI or here.     Social History   I have reviewed this patient's social history and updated it with pertinent information if needed.  Social History     Tobacco Use    Smoking status: Former     Packs/day: 0.50     Years: 30.00     Additional pack years: 0.00     Total pack years: 15.00     Types: Cigarettes     Start date: 6/15/1959     Quit date: 1989     Years since quittin.6    Smokeless tobacco: Never    Tobacco  comments:     smoked from  to    Vaping Use    Vaping Use: Never used   Substance Use Topics    Alcohol use: No     Comment: quit in ; recovering    Drug use: No         Allergies   Allergies   Allergen Reactions    No Known Allergies         Physical Exam   Vital Signs: Temp: 99.7  F (37.6  C) Temp src: Oral BP: (!) 164/83 Pulse: 96   Resp: 22 SpO2: 93 % O2 Device: Nasal cannula Oxygen Delivery: 2 LPM  Weight: 263 lbs 0 oz    Physical Exam  Vitals and nursing note reviewed.   Constitutional:       Appearance: He is obese. He is ill-appearing.   HENT:      Nose: Congestion present.      Mouth/Throat:      Mouth: Mucous membranes are moist.   Eyes:      Pupils: Pupils are equal, round, and reactive to light.   Cardiovascular:      Rate and Rhythm: Normal rate and regular rhythm.      Pulses: Normal pulses.      Heart sounds: Normal heart sounds. No murmur heard.     No friction rub. No gallop.   Pulmonary:      Effort: Tachypnea present.      Breath sounds: Rales present.   Abdominal:      Palpations: Abdomen is soft.      Tenderness: There is no abdominal tenderness.   Musculoskeletal:      Right lower le+ Pitting Edema present.      Left lower le+ Pitting Edema present.   Skin:     General: Skin is warm and moist.      Capillary Refill: Capillary refill takes 2 to 3 seconds.   Neurological:      General: No focal deficit present.      Mental Status: He is alert and oriented to person, place, and time.           Medical Decision Making       75 MINUTES SPENT BY ME on the date of service doing chart review, history, exam, documentation & further activities per the note.      Data     I have personally reviewed the following data over the past 24 hrs:    12.9 (H)  \   12.5 (L)   / 157     135 93 (L) 31.5 (H) /  115 (H)   3.1 (L) 32 (H) 1.28 (H) \     ALT: 20 AST: 21 AP: 115 TBILI: 0.7   ALB: 4.2 TOT PROTEIN: 7.1 LIPASE: N/A     Trop: 50 (H) BNP: 38     Procal: 0.09 CRP: N/A Lactic Acid: 1.1          Imaging results reviewed over the past 24 hrs:   Recent Results (from the past 24 hour(s))   XR Chest 2 Views    Narrative    XR CHEST 2 VIEWS 2/7/2024 11:14 AM    HISTORY: SOB    COMPARISON: 1/12/2024      Impression    IMPRESSION: Increased left lower lobe patchy opacities, suspicious for  pneumonia. Consider follow-up radiograph after treatment to ensure  resolution. No pleural effusion or pneumothorax. Normal heart size.  Pacemaker. Shoulder arthroplasties.    ONEIDA NUNEZ MD         SYSTEM ID:  [id number]

## 2024-02-07 NOTE — PROGRESS NOTES
RECEIVING UNIT ED HANDOFF REVIEW    ED Nurse Handoff Report was reviewed by: Austin Nelson RN on February 7, 2024 at 3:42 PM

## 2024-02-08 ENCOUNTER — APPOINTMENT (OUTPATIENT)
Dept: PHYSICAL THERAPY | Facility: CLINIC | Age: 84
DRG: 193 | End: 2024-02-08
Attending: NURSE PRACTITIONER
Payer: MEDICARE

## 2024-02-08 LAB
ANION GAP SERPL CALCULATED.3IONS-SCNC: 13 MMOL/L (ref 7–15)
BUN SERPL-MCNC: 22.3 MG/DL (ref 8–23)
CALCIUM SERPL-MCNC: 9.3 MG/DL (ref 8.8–10.2)
CHLORIDE SERPL-SCNC: 93 MMOL/L (ref 98–107)
CREAT SERPL-MCNC: 1.03 MG/DL (ref 0.67–1.17)
DEPRECATED HCO3 PLAS-SCNC: 30 MMOL/L (ref 22–29)
EGFRCR SERPLBLD CKD-EPI 2021: 72 ML/MIN/1.73M2
ERYTHROCYTE [DISTWIDTH] IN BLOOD BY AUTOMATED COUNT: 15.8 % (ref 10–15)
GLUCOSE SERPL-MCNC: 102 MG/DL (ref 70–99)
HCT VFR BLD AUTO: 36.8 % (ref 40–53)
HGB BLD-MCNC: 12.4 G/DL (ref 13.3–17.7)
MAGNESIUM SERPL-MCNC: 1.9 MG/DL (ref 1.7–2.3)
MCH RBC QN AUTO: 33 PG (ref 26.5–33)
MCHC RBC AUTO-ENTMCNC: 33.7 G/DL (ref 31.5–36.5)
MCV RBC AUTO: 98 FL (ref 78–100)
PHOSPHATE SERPL-MCNC: 2.7 MG/DL (ref 2.5–4.5)
PLATELET # BLD AUTO: 137 10E3/UL (ref 150–450)
POTASSIUM SERPL-SCNC: 2.8 MMOL/L (ref 3.4–5.3)
POTASSIUM SERPL-SCNC: 3.2 MMOL/L (ref 3.4–5.3)
RBC # BLD AUTO: 3.76 10E6/UL (ref 4.4–5.9)
SODIUM SERPL-SCNC: 136 MMOL/L (ref 135–145)
WBC # BLD AUTO: 14.6 10E3/UL (ref 4–11)

## 2024-02-08 PROCEDURE — 250N000013 HC RX MED GY IP 250 OP 250 PS 637: Performed by: HOSPITALIST

## 2024-02-08 PROCEDURE — 120N000001 HC R&B MED SURG/OB

## 2024-02-08 PROCEDURE — 84132 ASSAY OF SERUM POTASSIUM: CPT | Performed by: HOSPITALIST

## 2024-02-08 PROCEDURE — 272N000064 HC CIRCUIT HUMIDITY W/CPAP BIPAP

## 2024-02-08 PROCEDURE — 5A09357 ASSISTANCE WITH RESPIRATORY VENTILATION, LESS THAN 24 CONSECUTIVE HOURS, CONTINUOUS POSITIVE AIRWAY PRESSURE: ICD-10-PCS | Performed by: HOSPITALIST

## 2024-02-08 PROCEDURE — 97530 THERAPEUTIC ACTIVITIES: CPT | Mod: GP | Performed by: PHYSICAL THERAPIST

## 2024-02-08 PROCEDURE — 97116 GAIT TRAINING THERAPY: CPT | Mod: GP | Performed by: PHYSICAL THERAPIST

## 2024-02-08 PROCEDURE — 84100 ASSAY OF PHOSPHORUS: CPT | Performed by: STUDENT IN AN ORGANIZED HEALTH CARE EDUCATION/TRAINING PROGRAM

## 2024-02-08 PROCEDURE — 999N000157 HC STATISTIC RCP TIME EA 10 MIN

## 2024-02-08 PROCEDURE — 94660 CPAP INITIATION&MGMT: CPT

## 2024-02-08 PROCEDURE — 80048 BASIC METABOLIC PNL TOTAL CA: CPT | Performed by: NURSE PRACTITIONER

## 2024-02-08 PROCEDURE — 97161 PT EVAL LOW COMPLEX 20 MIN: CPT | Mod: GP | Performed by: PHYSICAL THERAPIST

## 2024-02-08 PROCEDURE — 250N000011 HC RX IP 250 OP 636: Performed by: STUDENT IN AN ORGANIZED HEALTH CARE EDUCATION/TRAINING PROGRAM

## 2024-02-08 PROCEDURE — 36415 COLL VENOUS BLD VENIPUNCTURE: CPT | Performed by: NURSE PRACTITIONER

## 2024-02-08 PROCEDURE — 83735 ASSAY OF MAGNESIUM: CPT | Performed by: NURSE PRACTITIONER

## 2024-02-08 PROCEDURE — 250N000011 HC RX IP 250 OP 636: Performed by: NURSE PRACTITIONER

## 2024-02-08 PROCEDURE — 99233 SBSQ HOSP IP/OBS HIGH 50: CPT | Performed by: HOSPITALIST

## 2024-02-08 PROCEDURE — 250N000013 HC RX MED GY IP 250 OP 250 PS 637: Performed by: STUDENT IN AN ORGANIZED HEALTH CARE EDUCATION/TRAINING PROGRAM

## 2024-02-08 PROCEDURE — 272N000063 HC CIRCUIT HUMID FACE/TRACH MSK

## 2024-02-08 PROCEDURE — 36415 COLL VENOUS BLD VENIPUNCTURE: CPT | Performed by: HOSPITALIST

## 2024-02-08 PROCEDURE — 250N000013 HC RX MED GY IP 250 OP 250 PS 637: Performed by: NURSE PRACTITIONER

## 2024-02-08 PROCEDURE — 85027 COMPLETE CBC AUTOMATED: CPT | Performed by: NURSE PRACTITIONER

## 2024-02-08 RX ORDER — CEFTRIAXONE 2 G/1
2 INJECTION, POWDER, FOR SOLUTION INTRAMUSCULAR; INTRAVENOUS EVERY 24 HOURS
Status: DISCONTINUED | OUTPATIENT
Start: 2024-02-08 | End: 2024-02-09 | Stop reason: HOSPADM

## 2024-02-08 RX ORDER — NALOXONE HYDROCHLORIDE 0.4 MG/ML
0.2 INJECTION, SOLUTION INTRAMUSCULAR; INTRAVENOUS; SUBCUTANEOUS
Status: DISCONTINUED | OUTPATIENT
Start: 2024-02-08 | End: 2024-02-09 | Stop reason: HOSPADM

## 2024-02-08 RX ORDER — POTASSIUM CHLORIDE 750 MG/1
20 TABLET, EXTENDED RELEASE ORAL ONCE
Status: DISCONTINUED | OUTPATIENT
Start: 2024-02-08 | End: 2024-02-08

## 2024-02-08 RX ORDER — POTASSIUM CHLORIDE 750 MG/1
40 TABLET, EXTENDED RELEASE ORAL ONCE
Status: COMPLETED | OUTPATIENT
Start: 2024-02-08 | End: 2024-02-08

## 2024-02-08 RX ORDER — POTASSIUM CHLORIDE 750 MG/1
20 TABLET, EXTENDED RELEASE ORAL 2 TIMES DAILY
Status: DISCONTINUED | OUTPATIENT
Start: 2024-02-08 | End: 2024-02-09 | Stop reason: HOSPADM

## 2024-02-08 RX ORDER — NALOXONE HYDROCHLORIDE 0.4 MG/ML
0.4 INJECTION, SOLUTION INTRAMUSCULAR; INTRAVENOUS; SUBCUTANEOUS
Status: DISCONTINUED | OUTPATIENT
Start: 2024-02-08 | End: 2024-02-09 | Stop reason: HOSPADM

## 2024-02-08 RX ADMIN — LEVOTHYROXINE SODIUM 175 MCG: 100 TABLET ORAL at 05:27

## 2024-02-08 RX ADMIN — ONDANSETRON 4 MG: 4 TABLET, ORALLY DISINTEGRATING ORAL at 17:37

## 2024-02-08 RX ADMIN — RIVAROXABAN 20 MG: 10 TABLET, FILM COATED ORAL at 17:23

## 2024-02-08 RX ADMIN — BENZOCAINE 6 MG-MENTHOL 10 MG LOZENGES 1 LOZENGE: at 17:37

## 2024-02-08 RX ADMIN — FUROSEMIDE 40 MG: 40 TABLET ORAL at 17:23

## 2024-02-08 RX ADMIN — OSELTAMIVIR PHOSPHATE 75 MG: 75 CAPSULE ORAL at 09:05

## 2024-02-08 RX ADMIN — ASPIRIN 81 MG CHEWABLE TABLET 81 MG: 81 TABLET CHEWABLE at 09:04

## 2024-02-08 RX ADMIN — ACETAMINOPHEN 650 MG: 325 TABLET, FILM COATED ORAL at 02:24

## 2024-02-08 RX ADMIN — ACETAMINOPHEN 650 MG: 325 TABLET, FILM COATED ORAL at 10:22

## 2024-02-08 RX ADMIN — LISINOPRIL 20 MG: 20 TABLET ORAL at 09:04

## 2024-02-08 RX ADMIN — OSELTAMIVIR PHOSPHATE 75 MG: 75 CAPSULE ORAL at 21:20

## 2024-02-08 RX ADMIN — METOLAZONE 2.5 MG: 2.5 TABLET ORAL at 09:04

## 2024-02-08 RX ADMIN — VENLAFAXINE HYDROCHLORIDE 75 MG: 75 TABLET ORAL at 21:20

## 2024-02-08 RX ADMIN — POTASSIUM CHLORIDE 40 MEQ: 750 TABLET, EXTENDED RELEASE ORAL at 19:08

## 2024-02-08 RX ADMIN — POTASSIUM CHLORIDE 20 MEQ: 750 TABLET, EXTENDED RELEASE ORAL at 19:08

## 2024-02-08 RX ADMIN — CEFTRIAXONE SODIUM 2 G: 2 INJECTION, POWDER, FOR SOLUTION INTRAMUSCULAR; INTRAVENOUS at 11:36

## 2024-02-08 RX ADMIN — ACETAMINOPHEN 650 MG: 325 TABLET, FILM COATED ORAL at 17:37

## 2024-02-08 RX ADMIN — POTASSIUM CHLORIDE 40 MEQ: 750 TABLET, EXTENDED RELEASE ORAL at 09:05

## 2024-02-08 RX ADMIN — FLUTICASONE FUROATE AND VILANTEROL TRIFENATATE 1 PUFF: 200; 25 POWDER RESPIRATORY (INHALATION) at 09:07

## 2024-02-08 RX ADMIN — ISOSORBIDE MONONITRATE 30 MG: 30 TABLET, EXTENDED RELEASE ORAL at 09:04

## 2024-02-08 RX ADMIN — TAMSULOSIN HYDROCHLORIDE 0.4 MG: 0.4 CAPSULE ORAL at 09:04

## 2024-02-08 RX ADMIN — AZITHROMYCIN MONOHYDRATE 500 MG: 500 INJECTION, POWDER, LYOPHILIZED, FOR SOLUTION INTRAVENOUS at 12:11

## 2024-02-08 RX ADMIN — VENLAFAXINE HYDROCHLORIDE 75 MG: 75 TABLET ORAL at 09:04

## 2024-02-08 RX ADMIN — POTASSIUM & SODIUM PHOSPHATES POWDER PACK 280-160-250 MG 1 PACKET: 280-160-250 PACK at 01:28

## 2024-02-08 RX ADMIN — POTASSIUM CHLORIDE 40 MEQ: 750 TABLET, EXTENDED RELEASE ORAL at 11:36

## 2024-02-08 RX ADMIN — METOLAZONE 2.5 MG: 2.5 TABLET ORAL at 17:23

## 2024-02-08 RX ADMIN — DILTIAZEM HYDROCHLORIDE 300 MG: 180 CAPSULE, COATED, EXTENDED RELEASE ORAL at 09:04

## 2024-02-08 RX ADMIN — ATORVASTATIN CALCIUM 80 MG: 80 TABLET, FILM COATED ORAL at 21:20

## 2024-02-08 RX ADMIN — UMECLIDINIUM 1 PUFF: 62.5 AEROSOL, POWDER ORAL at 09:07

## 2024-02-08 RX ADMIN — ONDANSETRON 4 MG: 2 INJECTION INTRAMUSCULAR; INTRAVENOUS at 10:22

## 2024-02-08 ASSESSMENT — ACTIVITIES OF DAILY LIVING (ADL)
ADLS_ACUITY_SCORE: 41
ADLS_ACUITY_SCORE: 43
ADLS_ACUITY_SCORE: 41
ADLS_ACUITY_SCORE: 41
ADLS_ACUITY_SCORE: 43
ADLS_ACUITY_SCORE: 41
ADLS_ACUITY_SCORE: 43

## 2024-02-08 NOTE — PLAN OF CARE
Trauma/Ortho/Medical (Choose one) Medical      Diagnosis: PNA, Influenza A, Hypoxia   POD#: NA  Mental Status: A&O  Activity/dangle: SBA  Diet: Low Sat Fat  Pain: PRN tylenol   Reddy/Voiding: Urinal   Tele/Restraints/Iso: Droplet Iso, tele   02/LDA: PIV, 2L NC  D/C Date: Pending   Other Info:

## 2024-02-08 NOTE — PLAN OF CARE
Goal Outcome Evaluation:    ([id number]) patient is A&O x4. On tele with SR. Denies chest pain or SOB. On 2LNC with O2 sat above 94%. Frequent productive cough. Voiding per urinal. Droplet precaution maintained. Potassium and phosphorous replaced, pending lab-  notified.

## 2024-02-08 NOTE — PROGRESS NOTES
[facility]    Medicine Progress Note - Hospitalist Service    Date of Admission:  2/7/2024    Assessment & Plan     Erich Craven is a 83 year old male admitted on 2/7/2024. 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  Patient was seen in local urgent care on Sunday where he tested positive for influenza A, he was started on Tamiflu. Work up in the emergency department including WBC 12.9, procal 0.09, lactic acid 1.1, troponin 50, BNP 38, CXR revealed increased left lower lobe patchy opacities.     Influenza A   Community acquired pneumonia  Hypoxia   Leukocytosis   *Tested positive for influenza A on 02/04/2024. Was started on Tamiflu. Tmax 100.5 with worsening cough and shortness of breath with SpO2 of 85% on RA at home on day of admission.   *Requiring 2-3L supplemental O2 in emergency department. Ambulatory pulse ox 88-89% on room air.   *CXR with increased left lower lobe patchy opacities. Started on azithromycin and rocephin.   *WBC 12.9, procal 0.09, lactic acid 1.1  *VBG 7.43 / 48 / 29 / 32    - COVID PCR Neg  - Continue antibiotics for community acquired pneumonia -Rocephin and azithromycin  - Blood cultures to follow-NGTD  - Continue to wean supplemental O2   - Aggressive pulmonary hygiene   - Symptomatic treatment for cough and myalgia, Robitussin/Tylenol as needed   - PT, OT eval     Hypertension   Chronic diastolic heart failure   *Most recent TTE 01/31/2024 with EF 55-60%  *BNP 38 on day of admission   *Bilateral lower extremity edema which patient states is slightly worse than his baseline  lymphedema.   - Continue PTA diltiazem, lisinopril, lasix and spironolactone.   - Low-sodium, low-fat diet  - Daily I's & O's, weight  - Trend BMP  - Given patient's history and increased lower extremity edema, could consider repeat TTE to rule out stress cardiomyopathy if O2 requirements worsen or do not improve.    Hypokalemia  -Potassium 2.8.  On replacement protocol.  -Resume Lasix when potassium >3  -Check magnesium and replace as needed     COPD  *Lungs with rales bilaterally. Used albuterol inhaler PTA without relief.   - Continue PTA albuterol PRN and Trelegy      Paroxysmal atrial fibrillation, on Xarelto   *Patient on anticoagulation. Also has history of DVT and PE. Rate controlled on day of admission.   - Continue PTA diltiazem   - on tele     CAD  *No anginal symptoms, however initial troponin 50. EKG with no acute ischemic changes.   -Troponin mildly elevated but flat, and noted patient has chronically elevated troponin.  No chest pain.  No acute ischemia suspected.  - Continue PTA imdur and ASA     HLD  - Continue PTA statin     CKD stage 3  *Creatinine baseline at 1.28 on day of admission.   - Continue to monitor --> 1.03     Hypokalemia   *Potassium 3.1 on day of admission   - RN driven replacement protocol ordered  - Trend BMP  - Monitor on tele     Hypothyroidism   - Continue PTA Synthroid     Type 2 DM HbA1c 6  *Does not take medication PTA   - Continue to monitor      RIN  *CPAP at home; no home O2 use   - Continue CPAP              Diet: Low Saturated Fat Diet    DVT Prophylaxis: DOAC  Reddy Catheter: Not present  Lines: None     Cardiac Monitoring: ACTIVE order. Indication: Chest pain/ ACS rule out (24 hours)  Code Status:  Special code as per CODE STATUS order    Clinically Significant Risk Factors Present on Admission        # Hypokalemia: Lowest K = 2.8 mmol/L in last 2 days, will replace as needed           # Hypertension: Noted on problem list   # Non-Invasive mechanical ventilation:  current O2 Device: BiPAP/CPAP  # Acute hypoxic respiratory failure: continue supplemental O2 as needed     # Obesity: Estimated body mass index is 35.67 kg/m  as calculated from the following:    Height as of this encounter: 1.829 m (6').    Weight as of this encounter: 119.3 kg (263 lb).        # Pacemaker present       Disposition Plan     Expected Discharge Date: 02/10/2024     expect 1-2 more days in hospital with IV antibiotic, weaning off supplemental O2, therapy evaluations               Bk Vanegas MD  Hospitalist Service  [facility]  Securely message with [employer] (more info)  Text page via [facility] Paging/Directory   ______________________________________________________________________    Interval History   Chart reviewed and patient was seen this morning.  Sitting up in the recliner chair.  Has cough productive of yellowish sputum.  Patient feels he has improved overall,  dyspnea improved, denies chest pain, reports fatigue.    Physical Exam   Vital Signs: Temp: 98  F (36.7  C) Temp src: Oral BP: (!) 140/72 Pulse: 80   Resp: 17 SpO2: 94 % O2 Device: BiPAP/CPAP Oxygen Delivery: 2 LPM  Weight: 263 lbs 0 oz    General: AAOx3, very pleasant, appears comfortable.  Appears to have quite moist congested cough.  HEENT: PERRLA EOMI. Mucosa moist.   Lungs: Bilateral equal air entry.  Coarse breath sounds throughout, no wheezing, no increased work of breathing.  CVS: S1S2 regular, no tachycardia or murmur.   Abdomen: Soft, NT, ND. BS heard.  MSK: No edema or deformities.  Neuro: AAOX3. CN 2-12 normal. Strength symmetrical.  Skin: No rash.       Medical Decision Making       45 MINUTES SPENT BY ME on the date of service doing chart review, history, exam, documentation & further activities per the note.      Data     I have personally reviewed the following data over the past 24 hrs:    14.6 (H)  \   12.4 (L)   / 137 (L)     136 93 (L) 22.3 /  102 (H)   2.8 (L) 30 (H) 1.03 \     Trop: 45  (H) BNP: N/A       Imaging results reviewed over the past 24 hrs:   No results found for this or any previous visit (from the past 24 hour(s)).

## 2024-02-08 NOTE — PROGRESS NOTES
"   02/08/24 0800   Appointment Info   Signing Clinician's Name / Credentials (PT) Mika Santillan DPT       Present no   Living Environment   People in Home alone   Current Living Arrangements independent living facility   Home Accessibility no concerns   Transportation Anticipated family or friend will provide   Living Environment Comments Pt lives alone in an ILF. No stairs. Pt reports a family member will pick pt up at discharge. Pt reports he plans on returning home alone but can have someone stay with him if needed.   Self-Care   Usual Activity Tolerance good   Current Activity Tolerance moderate   Regular Exercise No   Equipment Currently Used at Home cane, straight   Fall history within last six months no   Activity/Exercise/Self-Care Comment Pt reports being IND at baseline with all ADLs. Pt ambulates w/ a SEC at baseline but has a FWW if needed. Pt drives. Pt has been attending OP pulmonary rehab.   General Information   Onset of Illness/Injury or Date of Surgery 02/08/24   Referring Physician Keara Mo APRN CNP   Patient/Family Therapy Goals Statement (PT) \"To get better\"   Pertinent History of Current Problem (include personal factors and/or comorbidities that impact the POC) Per Chart: Erich Craven is a 83 year old male admitted on 2/7/2024. 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  Patient was seen in local urgent care on Sunday where he tested positive for influenza A, he was started on Tamiflu. Work up in the emergency department including WBC 12.9, procal 0.09, lactic acid 1.1, " troponin 50, BNP 38, CXR revealed increased left lower lobe patchy opacities.   Existing Precautions/Restrictions fall   Weight-Bearing Status - LLE full weight-bearing   Weight-Bearing Status - RLE full weight-bearing   Cognition   Orientation Status (Cognition) oriented x 3   Pain Assessment   Patient Currently in Pain No   Integumentary/Edema   Integumentary/Edema no deficits were identifed   Posture    Posture Protracted shoulders;Forward head position   Range of Motion (ROM)   Range of Motion ROM is WFL   Strength (Manual Muscle Testing)   Strength (Manual Muscle Testing) Able to perform R SLR;Able to perform L SLR   Strength Comments BLE Hip Flexion: 4/5   Bed Mobility   Comment, (Bed Mobility) Supine>sit w/ SBA   Transfers   Comment, (Transfers) Sit>stand w/ SEC and SBA   Gait/Stairs (Locomotion)   [place] Level (Gait) contact guard   Assistive Device (Gait) cane, straight   Distance in Feet (Gait) 5'   Balance   Balance Comments Adequate static sitting balance; pt using a SEC for added stability and support.   Sensory Examination   Sensory Perception patient reports no sensory changes   Clinical Impression   Criteria for Skilled Therapeutic Intervention Yes, treatment indicated   PT Diagnosis (PT) Impaired gait   Influenced by the following impairments Decreased activity tolerance; O2 needs   Functional limitations due to impairments Impaired functional mobility   Clinical Presentation (PT Evaluation Complexity) stable   Clinical Presentation Rationale Clinical judgement   Clinical Decision Making (Complexity) low complexity   Planned Therapy Interventions (PT) balance training;bed mobility training;gait training;patient/family education;strengthening;transfer training;progressive activity/exercise   Risk & Benefits of therapy have been explained evaluation/treatment results reviewed;care plan/treatment goals reviewed;risks/benefits reviewed;current/potential barriers reviewed;participants voiced  agreement with care plan;participants included;patient   PT Total Evaluation Time   PT Eval, Low Complexity Minutes (51250) 10   Physical Therapy Goals   PT Frequency Daily   PT Predicted Duration/Target Date for Goal Attainment 02/13/24   PT Goals Bed Mobility;Transfers;Gait   PT: Bed Mobility Supervision/stand-by assist;Supine to/from sit   PT: Transfers Supervision/stand-by assist;Sit to/from stand;Assistive device   PT: Gait Supervision/stand-by assist;Assistive device;150 feet   Interventions   Interventions Quick Adds Gait Training;Therapeutic Activity   Therapeutic Activity   Therapeutic Activities: dynamic activities to improve functional performance Minutes (98645) 14   Symptoms Noted During/After Treatment Shortness of breath   Treatment Detail/Skilled Intervention Greeted pt supine in bed, agreed to PT. Pt on RA, sating at ~90% SpO2 at rest, wanting to trial OOB activity on RA. Pt performed supine>sit w/ SBA. Once in sitting, pt able to scoot self to EOB and sit unsupported without LOB. Pt performed sit>stand x 6 w/ SEC and SBA, verbal cues for hand placement. Pt ambulated to the bathroom, requiring SBA for hygiene. After ambulation, pt returned to chair and performed stand>sit w/ SEC and SBA, verbal cues to descend in a slow, controlled motion. Pt ended session sitting in chair, with all needs met and CNA present.   Gait Training   Gait Training Minutes (64010) 14   Symptoms Noted During/After Treatment (Gait Training) shortness of breath   Treatment Detail/Skilled Intervention Pt ambulated w/ SEC and CGA. Pt ambulated with decreased gait speed, downward gaze, decreased step length, and mildly unsteady. Verbal cues for upright gaze and posture, to increase step length, and pacing. Good carryover with cues. Pt with improved steadiness throughout ambulation. Pt on RA, desating to ~85% SpO2 with activity, requiring 2L O2 via NC to improve O2 sats to ~94% SpO2.   Distance in Feet 125'   PT Discharge Planning    PT Plan Repeat sit>stands for strengthening; progress gait w/ SEC; monitor O2   PT Discharge Recommendation (DC Rec) home with assist   PT Rationale for DC Rec Pt is below baseline but is moving well. Pt currently requiring SBA for transfers and CGA for ambulation. Anticipate with ongoing IP PT pt will be able to progress to return back to ILF. Pt reports he has access to assist if needed.   PT Brief overview of current status SBA for transfers w/ SEC; CGA for ambulation w/ SEC   Total Session Time   Timed Code Treatment Minutes 28   Total Session Time (sum of timed and untimed services) 38

## 2024-02-08 NOTE — PLAN OF CARE
Goal Outcome Evaluation:   A/OX4, 2L nasal oxygen satin at 93%. Up SBA and walker, tylenol given for pain and Zofran given for nausea. On IV antibiotics. BLE alexys. Tolerating regular diet. Tele reads SR. Mag, phos, Potassium replacement, potassium replaced today, recheck at 1600. Pt is up on chair now.

## 2024-02-08 NOTE — PLAN OF CARE
Date/Time: 2/7/24 [id number]    Trauma/Ortho/Medical (Choose one) Medical     Diagnosis: PNA, Influenza A, Hypoxia   POD#: NA  Mental Status: A&O  Activity/dangle: SBA  Diet: Low Sat Fat  Pain: PRN tylenol   Reddy/Voiding: Urinal   Tele/Restraints/Iso: Droplet Iso, tele   02/LDA: PIV, 2L NC  D/C Date: Pending   Other Info: ED admit this evening, k 3.1 and replaced, phos 2.1 and replaced

## 2024-02-09 VITALS
DIASTOLIC BLOOD PRESSURE: 59 MMHG | TEMPERATURE: 97.5 F | WEIGHT: 259.92 LBS | SYSTOLIC BLOOD PRESSURE: 110 MMHG | HEART RATE: 85 BPM | BODY MASS INDEX: 35.21 KG/M2 | RESPIRATION RATE: 16 BRPM | OXYGEN SATURATION: 92 % | HEIGHT: 72 IN

## 2024-02-09 LAB
ANION GAP SERPL CALCULATED.3IONS-SCNC: 9 MMOL/L (ref 7–15)
BASOPHILS # BLD AUTO: 0 10E3/UL (ref 0–0.2)
BASOPHILS NFR BLD AUTO: 0 %
BUN SERPL-MCNC: 29.7 MG/DL (ref 8–23)
CALCIUM SERPL-MCNC: 9 MG/DL (ref 8.8–10.2)
CHLORIDE SERPL-SCNC: 94 MMOL/L (ref 98–107)
CREAT SERPL-MCNC: 1.2 MG/DL (ref 0.67–1.17)
DEPRECATED HCO3 PLAS-SCNC: 32 MMOL/L (ref 22–29)
EGFRCR SERPLBLD CKD-EPI 2021: 60 ML/MIN/1.73M2
EOSINOPHIL # BLD AUTO: 0.1 10E3/UL (ref 0–0.7)
EOSINOPHIL NFR BLD AUTO: 1 %
ERYTHROCYTE [DISTWIDTH] IN BLOOD BY AUTOMATED COUNT: 15.9 % (ref 10–15)
GLUCOSE SERPL-MCNC: 112 MG/DL (ref 70–99)
HCT VFR BLD AUTO: 34.6 % (ref 40–53)
HGB BLD-MCNC: 11.3 G/DL (ref 13.3–17.7)
IMM GRANULOCYTES # BLD: 0.1 10E3/UL
IMM GRANULOCYTES NFR BLD: 1 %
LYMPHOCYTES # BLD AUTO: 1.9 10E3/UL (ref 0.8–5.3)
LYMPHOCYTES NFR BLD AUTO: 19 %
MAGNESIUM SERPL-MCNC: 2 MG/DL (ref 1.7–2.3)
MCH RBC QN AUTO: 32.1 PG (ref 26.5–33)
MCHC RBC AUTO-ENTMCNC: 32.7 G/DL (ref 31.5–36.5)
MCV RBC AUTO: 98 FL (ref 78–100)
MONOCYTES # BLD AUTO: 0.6 10E3/UL (ref 0–1.3)
MONOCYTES NFR BLD AUTO: 6 %
NEUTROPHILS # BLD AUTO: 7.1 10E3/UL (ref 1.6–8.3)
NEUTROPHILS NFR BLD AUTO: 73 %
NRBC # BLD AUTO: 0 10E3/UL
NRBC BLD AUTO-RTO: 0 /100
PHOSPHATE SERPL-MCNC: 2.4 MG/DL (ref 2.5–4.5)
PLATELET # BLD AUTO: 147 10E3/UL (ref 150–450)
POTASSIUM SERPL-SCNC: 3.3 MMOL/L (ref 3.4–5.3)
POTASSIUM SERPL-SCNC: 3.5 MMOL/L (ref 3.4–5.3)
RBC # BLD AUTO: 3.52 10E6/UL (ref 4.4–5.9)
SODIUM SERPL-SCNC: 135 MMOL/L (ref 135–145)
WBC # BLD AUTO: 9.9 10E3/UL (ref 4–11)

## 2024-02-09 PROCEDURE — 250N000013 HC RX MED GY IP 250 OP 250 PS 637: Performed by: NURSE PRACTITIONER

## 2024-02-09 PROCEDURE — 85025 COMPLETE CBC W/AUTO DIFF WBC: CPT | Performed by: HOSPITALIST

## 2024-02-09 PROCEDURE — 84100 ASSAY OF PHOSPHORUS: CPT | Performed by: HOSPITALIST

## 2024-02-09 PROCEDURE — 250N000013 HC RX MED GY IP 250 OP 250 PS 637: Performed by: HOSPITALIST

## 2024-02-09 PROCEDURE — 250N000011 HC RX IP 250 OP 636: Performed by: NURSE PRACTITIONER

## 2024-02-09 PROCEDURE — 36415 COLL VENOUS BLD VENIPUNCTURE: CPT | Performed by: HOSPITALIST

## 2024-02-09 PROCEDURE — 83735 ASSAY OF MAGNESIUM: CPT | Performed by: HOSPITALIST

## 2024-02-09 PROCEDURE — 250N000011 HC RX IP 250 OP 636: Performed by: STUDENT IN AN ORGANIZED HEALTH CARE EDUCATION/TRAINING PROGRAM

## 2024-02-09 PROCEDURE — 80048 BASIC METABOLIC PNL TOTAL CA: CPT | Performed by: HOSPITALIST

## 2024-02-09 PROCEDURE — 99239 HOSP IP/OBS DSCHRG MGMT >30: CPT | Performed by: HOSPITALIST

## 2024-02-09 RX ORDER — GUAIFENESIN 200 MG/10ML
200 LIQUID ORAL EVERY 4 HOURS PRN
Qty: 180 ML | Refills: 0 | Status: SHIPPED | OUTPATIENT
Start: 2024-02-09 | End: 2024-04-06

## 2024-02-09 RX ORDER — POTASSIUM CHLORIDE 750 MG/1
40 TABLET, EXTENDED RELEASE ORAL ONCE
Status: COMPLETED | OUTPATIENT
Start: 2024-02-09 | End: 2024-02-09

## 2024-02-09 RX ORDER — LORATADINE 10 MG/1
10 TABLET ORAL DAILY
Qty: 5 TABLET | Refills: 0 | Status: SHIPPED | OUTPATIENT
Start: 2024-02-09 | End: 2024-02-14

## 2024-02-09 RX ORDER — CEFDINIR 300 MG/1
300 CAPSULE ORAL 2 TIMES DAILY
Qty: 8 CAPSULE | Refills: 0 | Status: SHIPPED | OUTPATIENT
Start: 2024-02-10 | End: 2024-02-14

## 2024-02-09 RX ADMIN — ASPIRIN 81 MG CHEWABLE TABLET 81 MG: 81 TABLET CHEWABLE at 09:38

## 2024-02-09 RX ADMIN — TAMSULOSIN HYDROCHLORIDE 0.4 MG: 0.4 CAPSULE ORAL at 09:38

## 2024-02-09 RX ADMIN — ISOSORBIDE MONONITRATE 30 MG: 30 TABLET, EXTENDED RELEASE ORAL at 09:38

## 2024-02-09 RX ADMIN — POTASSIUM & SODIUM PHOSPHATES POWDER PACK 280-160-250 MG 1 PACKET: 280-160-250 PACK at 12:59

## 2024-02-09 RX ADMIN — SPIRONOLACTONE 25 MG: 25 TABLET ORAL at 09:38

## 2024-02-09 RX ADMIN — BENZOCAINE 6 MG-MENTHOL 10 MG LOZENGES 1 LOZENGE: at 09:47

## 2024-02-09 RX ADMIN — FLUTICASONE FUROATE AND VILANTEROL TRIFENATATE 1 PUFF: 200; 25 POWDER RESPIRATORY (INHALATION) at 09:37

## 2024-02-09 RX ADMIN — CEFTRIAXONE SODIUM 2 G: 2 INJECTION, POWDER, FOR SOLUTION INTRAMUSCULAR; INTRAVENOUS at 12:17

## 2024-02-09 RX ADMIN — METOLAZONE 2.5 MG: 2.5 TABLET ORAL at 15:36

## 2024-02-09 RX ADMIN — POTASSIUM CHLORIDE 20 MEQ: 750 TABLET, EXTENDED RELEASE ORAL at 09:38

## 2024-02-09 RX ADMIN — MINERAL SUPPLEMENT IRON 300 MG / 5 ML STRENGTH LIQUID 100 PER BOX UNFLAVORED 142 MG: at 09:39

## 2024-02-09 RX ADMIN — OSELTAMIVIR PHOSPHATE 75 MG: 75 CAPSULE ORAL at 09:39

## 2024-02-09 RX ADMIN — FUROSEMIDE 40 MG: 40 TABLET ORAL at 15:36

## 2024-02-09 RX ADMIN — POTASSIUM & SODIUM PHOSPHATES POWDER PACK 280-160-250 MG 1 PACKET: 280-160-250 PACK at 09:39

## 2024-02-09 RX ADMIN — VENLAFAXINE HYDROCHLORIDE 75 MG: 75 TABLET ORAL at 09:38

## 2024-02-09 RX ADMIN — LEVOTHYROXINE SODIUM 175 MCG: 100 TABLET ORAL at 06:06

## 2024-02-09 RX ADMIN — UMECLIDINIUM 1 PUFF: 62.5 AEROSOL, POWDER ORAL at 09:37

## 2024-02-09 RX ADMIN — AZITHROMYCIN MONOHYDRATE 500 MG: 500 INJECTION, POWDER, LYOPHILIZED, FOR SOLUTION INTRAVENOUS at 12:59

## 2024-02-09 RX ADMIN — METOLAZONE 2.5 MG: 2.5 TABLET ORAL at 09:38

## 2024-02-09 RX ADMIN — FUROSEMIDE 40 MG: 40 TABLET ORAL at 09:37

## 2024-02-09 RX ADMIN — LISINOPRIL 20 MG: 20 TABLET ORAL at 09:40

## 2024-02-09 RX ADMIN — POTASSIUM CHLORIDE 40 MEQ: 750 TABLET, EXTENDED RELEASE ORAL at 00:49

## 2024-02-09 RX ADMIN — DILTIAZEM HYDROCHLORIDE 300 MG: 180 CAPSULE, COATED, EXTENDED RELEASE ORAL at 09:38

## 2024-02-09 ASSESSMENT — ACTIVITIES OF DAILY LIVING (ADL)
ADLS_ACUITY_SCORE: 43

## 2024-02-09 NOTE — PROGRESS NOTES
Patient has been assessed for Home Oxygen needs. Oxygen readings:    *Pulse oximetry (SpO2) = 91% on room air at rest while awake.    *SpO2 improved to 94% on 2liters/minute at rest.    *SpO2 = 90% on room air during activity/with exercise.    *SpO2 improved to 93% on 2liters/minute during activity/with exercise.

## 2024-02-09 NOTE — PLAN OF CARE
Goal Outcome Evaluation:           Date/Time: 2/9/24 1900-0730     Trauma/Ortho/Medical (Choose one) Medical      Diagnosis: Lt lung pneumonia, Influenza A, Hypoxia   POD#: NA  Mental Status: A&Ox4, [place]  Activity/dangle: SBA with cane  Diet: Low Sat Fat  Pain: PRN  Reddy/Voiding, voiding ok, urinal given    Tele/Restraints/Iso: Droplet Iso, tele NSR  02/LDA: PIV, CPAP at NC, sats 93-94%  D/C Date: PT recommends home with assist  Other Info: K+ recheck came back 3.5 recheck tomorrow

## 2024-02-09 NOTE — PLAN OF CARE
Physical Therapy Discharge Summary    Reason for therapy discharge:    Discharged to home.    Progress towards therapy goal(s). See goals on Care Plan in [facility] electronic health record for goal details.  Goals partially met.  Barriers to achieving goals:   discharge from facility.    Therapy recommendation(s):    Pt is below baseline but is moving well. Pt currently requiring SBA for transfers and CGA for ambulation. Anticipate with ongoing IP PT pt will be able to progress to return back to ILF. Pt reports he has access to assist if needed.

## 2024-02-09 NOTE — DISCHARGE SUMMARY
[facility]  Hospitalist Discharge Summary      Date of Admission:  2/7/2024  Date of Discharge:  2/9/2024  Discharging Provider: Bk Vanegas MD  Discharge Service: Hospitalist Service    Discharge Diagnoses     Please refer to the hospital course below    Clinically Significant Risk Factors     # Obesity: Estimated body mass index is 35.25 kg/m  as calculated from the following:    Height as of this encounter: 1.829 m (6').    Weight as of this encounter: 117.9 kg (259 lb 14.8 oz).       Follow-ups Needed After Discharge   Follow-up Appointments     Follow-up and recommended labs and tests       Follow up with primary care provider, Anne Heath, within 7 days to   evaluate medication change and for hospital follow- up.  The following   labs/tests are recommended: CBC and BMP in a week.             Unresulted Labs Ordered in the Past 30 Days of this Admission       Date and Time Order Name Status Description    2/7/2024 10:14 AM Blood Culture Line, Other Preliminary     2/7/2024 10:14 AM Blood Culture Peripheral Blood Preliminary         These results will be followed up by Hospitalist     Discharge Disposition   Discharged to home  Condition at discharge: Stable    Hospital Course   Erich Craven is a 83 year old male admitted on 2/7/2024. 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  Patient was seen in local urgent care on Sunday where he tested positive for influenza A, he was started on Tamiflu. Work up in the emergency department including WBC 12.9, procal 0.09, lactic acid 1.1, troponin 50,  BNP 38, CXR revealed increased left lower lobe patchy opacities.     Influenza A   Community acquired pneumonia  Hypoxia   Leukocytosis   *Tested positive for influenza A on 02/04/2024. Was started on Tamiflu. Tmax 100.5 with worsening cough and shortness of breath with SpO2 of 85% on RA at home on day of admission.   *Requiring 2-3L supplemental O2 in emergency department. Ambulatory pulse ox 88-89% on room air.   *CXR with increased left lower lobe patchy opacities. Started on azithromycin and rocephin.   *WBC 12.9, procal 0.09, lactic acid 1.1  *VBG 7.43 / 48 / 29 / 32    - COVID PCR Neg  - Continued antibiotics for community acquired pneumonia - Rocephin and azithromycin while in hospital. Received 3 doses of IV azithromycin 500 mg daily x3. Weaned off supplemental O2, had exercise oxymetry, and saturating >88 on rest as well as activity. Uses O2 at night with CPAP per him  - Blood cultures to follow-NGTD   - Aggressive pulmonary hygiene   - Symptomatic treatment for cough and myalgia, Robitussin/Tylenol as needed. Antihistamine claritin added given congested sinuses  - PT, OT eval, recommended home with assist. Discussed with patient and he feels comfortable going home today     Hypertension   Chronic diastolic heart failure   *Most recent TTE 01/31/2024 with EF 55-60%  *BNP 38 on day of admission   *Bilateral lower extremity edema which patient states is slightly worse than his baseline lymphedema.   - Continue PTA diltiazem, lisinopril, lasix and spironolactone.   - Low-sodium, low-fat diet     Hypokalemia  -Potassium as low as 2.8. replaced per protocol.mag as well checked, normal.   -At home, he is on scheduled K supplement     COPD  *Lungs with rales bilaterally. Used albuterol inhaler PTA without relief.   - Continue PTA albuterol PRN and Trelegy      Paroxysmal atrial fibrillation, on Xarelto   *Patient on anticoagulation. Also has history of DVT and PE. Rate controlled on day of admission.   - Continue  PTA diltiazem      CAD  HLD  *No anginal symptoms, however initial troponin 50. EKG with no acute ischemic changes.   -Troponin mildly elevated but flat, and noted patient has chronically elevated troponin.  No chest pain.  No acute ischemia suspected.  - Continue PTA imdur and ASA  - Continue PTA statin     CKD stage 3  *Creatinine baseline at 1.28 on day of admission.   -  Cr 1.03--1.2 this stay      Hypothyroidism   - Continue PTA Synthroid     Type 2 DM HbA1c 6  *Does not take medication PTA   - Continue to monitor      RIN  *CPAP at home; no home O2 use   - Continue CPAP        Consultations This Hospital Stay   PHYSICAL THERAPY ADULT IP CONSULT  CARE MANAGEMENT / SOCIAL WORK IP CONSULT  VASCULAR ACCESS ADULT IP CONSULT    Code Status   Special Code    Time Spent on this Encounter   I, Bk Vanegas MD, personally saw the patient today and spent greater than 30 minutes discharging this patient.       Bk Vanegas MD  [facility]  [address] [address]  Phone: [phone]  Fax: [phone]  ______________________________________________________________________    Physical Exam   Vital Signs: Temp: 97.5  F (36.4  C) Temp src: Oral BP: 110/59 Pulse: 85   Resp: 17 SpO2: 93 % O2 Device: None (Room air) Oxygen Delivery: 2 LPM  Weight: 259 lbs 14.76 oz    General: AAOx3, appears comfortable. Voice sounds nasal.   HEENT: PERRLA EOMI. Mucosa moist.   Lungs: Bilateral equal air entry. Coarse breath sounds with crackles more pronounced on Lt posterior, normal work of breathing. On room air  CVS: S1S2 regular, no tachycardia or murmur.   Abdomen: Soft, NT, ND. BS heard.  MSK: No edema or deformities.  Neuro: AAOX3. CN 2-12 normal. Strength symmetrical.  Skin: No rash.          Primary Care Physician   Anne Heath    Discharge Orders      Reason for your hospital stay    Influenza A   Bacterial pneumonia     Follow-up and recommended labs and tests     Follow  up with primary care provider, Anne Heath, within 7 days to evaluate medication change and for hospital follow- up.  The following labs/tests are recommended: CBC and BMP in a week.     Activity    Your activity upon discharge: activity as tolerated     Diet    Follow this diet upon discharge: Orders Placed This Encounter      Low Saturated Fat Diet       Significant Results and Procedures   Most Recent 3 CBC's:  Recent Labs   Lab Test 02/09/24  0551 02/08/24  0640 02/07/24  1038   WBC 9.9 14.6* 12.9*   HGB 11.3* 12.4* 12.5*   MCV 98 98 98   * 137* 157     Most Recent 3 BMP's:  Recent Labs   Lab Test 02/09/24  0551 02/08/24  2348 02/08/24  1608 02/08/24  0640 02/07/24  2309 02/07/24  1038     --   --  136  --  135   POTASSIUM 3.5 3.3* 3.2* 2.8*   < > 3.1*   CHLORIDE 94*  --   --  93*  --  93*   CO2 32*  --   --  30*  --  32*   BUN 29.7*  --   --  22.3  --  31.5*   CR 1.20*  --   --  1.03  --  1.28*   ANIONGAP 9  --   --  13  --  10   LAWSON 9.0  --   --  9.3  --  9.3   *  --   --  102*  --  115*    < > = values in this interval not displayed.     7-Day Micro Results       Collected Updated Procedure Result Status      02/07/2024 1420 02/07/2024 1506 Symptomatic COVID-19 Virus (Coronavirus) by PCR Nasopharyngeal [[id number]]    Swab from Nasopharyngeal    Final result Component Value   SARS CoV2 PCR Negative   NEGATIVE: SARS-CoV-2 (COVID-19) RNA not detected, presumed negative.            02/07/2024 1038 02/09/2024 1301 Blood Culture Peripheral Blood [[id number]]   Peripheral Blood    Preliminary result Component Value   Culture No growth after 2 days  [P]                02/07/2024 1038 02/09/2024 1301 Blood Culture Line, Other [[id number]]   Blood from Line, Other    Preliminary result Component Value   Culture No growth after 2 days  [P]                02/04/2024 1003 02/04/2024 1030 Streptococcus A Rapid Screen w/Reflex to PCR - Clinic Collect [[id number]]   Swab from Throat    Final  result Component Value   Group A Strep antigen Negative            02/04/2024 1003 02/04/2024 1028 Influenza A & B Antigen - Clinic Collect [[id number]]    (Abnormal)   Swab from Nose    Final result Component Value   Influenza A antigen Positive   Influenza B antigen Negative            02/04/2024 1003 02/04/2024 1659 Group A Streptococcus PCR Throat Swab [[id number]]    Swab from Throat    Final result Component Value   Group A strep by PCR Not Detected                  Most Recent TSH and T4:  Recent Labs   Lab Test 01/12/24  1155 12/15/23  0822 10/04/23  1358   TSH 0.56   < > 0.94   T4  --   --  1.56    < > = values in this interval not displayed.   ,   Results for orders placed or performed during the hospital encounter of 02/07/24   XR Chest 2 Views    Narrative    XR CHEST 2 VIEWS 2/7/2024 11:14 AM    HISTORY: SOB    COMPARISON: 1/12/2024      Impression    IMPRESSION: Increased left lower lobe patchy opacities, suspicious for  pneumonia. Consider follow-up radiograph after treatment to ensure  resolution. No pleural effusion or pneumothorax. Normal heart size.  Pacemaker. Shoulder arthroplasties.    ONEIDA NUNEZ MD         SYSTEM ID:  [id number]     *Note: Due to a large number of results and/or encounters for the requested time period, some results have not been displayed. A complete set of results can be found in Results Review.       Discharge Medications   Current Discharge Medication List        START taking these medications    Details   cefdinir (OMNICEF) 300 MG capsule Take 1 capsule (300 mg) by mouth 2 times daily for 4 days  Qty: 8 capsule, Refills: 0    Associated Diagnoses: Bacterial pneumonia      guaiFENesin (ROBITUSSIN) 20 mg/mL liquid Take 10 mLs (200 mg) by mouth every 4 hours as needed for cough  Qty: 180 mL, Refills: 0    Associated Diagnoses: Acute cough      loratadine (CLARITIN) 10 MG tablet Take 1 tablet (10 mg) by mouth daily for 5 days  Qty: 5 tablet, Refills: 0    Associated  Diagnoses: Congestion of paranasal sinus      potassium & sodium phosphates (NEUTRA-PHOS) 280-160-250 MG Packet Take 1 packet by mouth every 4 hours for 2 doses  Qty: 2 packet, Refills: 0    Associated Diagnoses: Hypophosphatemia           CONTINUE these medications which have NOT CHANGED    Details   acetaminophen (TYLENOL) 500 MG tablet Take 1,000 mg by mouth 3 times daily      Acetylcarnitine HCl (ACETYL L-CARNITINE PO) Take 1 capsule by mouth daily      albuterol (PROAIR HFA/PROVENTIL HFA/VENTOLIN HFA) 108 (90 Base) MCG/ACT inhaler Inhale 2 puffs into the lungs 4 times daily as needed for shortness of breath or wheezing  Qty: 18 g, Refills: 3    Comments: Pharmacy may dispense brand covered by insurance (Proair, or proventil or ventolin or generic albuterol inhaler)  Associated Diagnoses: Shortness of breath      aspirin (ASA) 81 MG chewable tablet Take 1 tablet by mouth daily      atorvastatin (LIPITOR) 80 MG tablet Take 1 tablet (80 mg) by mouth every evening  Qty: 90 tablet, Refills: 4    Associated Diagnoses: Hyperlipidemia LDL goal <100      diltiazem ER COATED BEADS (CARDIZEM CD/CARTIA XT) 300 MG 24 hr capsule Take 1 capsule (300 mg) by mouth daily  Qty: 90 capsule, Refills: 0    Associated Diagnoses: Essential hypertension      ferrous sulfate (SLO-FE) 142 (45 Fe) MG CR tablet Take 1 tablet (142 mg) by mouth every other day  Qty: 45 tablet, Refills: 3    Associated Diagnoses: Low iron      Fluticasone-Umeclidin-Vilanterol (TRELEGY ELLIPTA) 200-62.5-25 MCG/INH oral inhaler Inhale 1 puff into the lungs daily      furosemide (LASIX) 40 MG tablet Take 1 tablet (40 mg) by mouth 2 times daily  Qty: 180 tablet, Refills: 1    Associated Diagnoses: Chronic diastolic heart failure (H)      isosorbide mononitrate (IMDUR) 30 MG 24 hr tablet Take 1 tablet (30 mg) by mouth daily  Qty: 90 tablet, Refills: 3    Associated Diagnoses: Heart failure with preserved ejection fraction, unspecified HF chronicity (H)       levothyroxine (SYNTHROID/LEVOTHROID) 175 MCG tablet Take 1 tablet (175 mcg) by mouth daily  Qty: 90 tablet, Refills: 3    Associated Diagnoses: Hypothyroidism, unspecified type      lisinopril (ZESTRIL) 40 MG tablet Take 0.5 tablets (20 mg) by mouth daily    Associated Diagnoses: Primary hypertension      metolazone (ZAROXOLYN) 2.5 MG tablet TAKE 1 TABLET(2.5 MG) BY MOUTH TWICE DAILY  Qty: 180 tablet, Refills: 2    Associated Diagnoses: Chronic diastolic heart failure (H)      Multiple vitamin TABS Take 1 tablet by mouth daily      oseltamivir (TAMIFLU) 75 MG capsule Take 1 capsule (75 mg) by mouth 2 times daily for 5 days  Qty: 10 capsule, Refills: 0    Associated Diagnoses: Influenza A      polyethylene glycol (MIRALAX) 17 g packet Take 17 g by mouth daily  Qty: 7 packet, Refills: 0    Associated Diagnoses: Chronic left shoulder pain      potassium chloride ER (KLOR-CON M) 10 MEQ CR tablet Take 1 tablet (10 mEq) by mouth 2 times daily  Qty: 180 tablet, Refills: 1    Associated Diagnoses: Hypokalemia      spironolactone (ALDACTONE) 25 MG tablet TAKE 1 TABLET(25 MG) BY MOUTH EVERY OTHER DAY  Qty: 45 tablet, Refills: 3    Associated Diagnoses: Lymphedema      tamsulosin (FLOMAX) 0.4 MG capsule TAKE 1 CAPSULE(0.4 MG) BY MOUTH DAILY  Qty: 90 capsule, Refills: 2    Associated Diagnoses: Hypothyroidism, unspecified type      TAURINE PO Take 1 capsule by mouth daily      venlafaxine (EFFEXOR) 75 MG tablet TAKE 1 TABLET(75 MG) BY MOUTH TWICE DAILY  Qty: 180 tablet, Refills: 3    Associated Diagnoses: Anxiety      XARELTO ANTICOAGULANT 20 MG TABS tablet TAKE 1 TABLET(20 MG) BY MOUTH DAILY WITH DINNER  Qty: 30 tablet, Refills: 11    Associated Diagnoses: Deep vein thrombosis (DVT) of distal vein of lower extremity, unspecified chronicity, unspecified laterality (H); Paroxysmal A-fib (H)           Allergies   Allergies   Allergen Reactions    No Known Allergies

## 2024-02-09 NOTE — PLAN OF CARE
Goal Outcome Evaluation:      Plan of Care Reviewed With: patient    Overall Patient Progress: no changeOverall Patient Progress: no change     Date/Time: 2/8/24 [id number]     Trauma/Ortho/Medical (Choose one) Medical      Diagnosis: Lt lung pneumonia, Influenza A, Hypoxia   POD#: NA  Mental Status: A&Ox4, [place]  Activity/dangle: SBA with cane  Diet: Low Sat Fat  Pain: PRN tylenol, lozenges and zofran given this shift  Reddy/Voiding: Ambulating to BR, voiding ok, urinal given  to measure urine since lasix was administered  Tele/Restraints/Iso: Droplet Iso, tele NSR  02/LDA: PIV, 2L NC, sats 93-94%  D/C Date: PT recommends home with assist, CC consult pending  Other Info: K+ recheck came back 3.2, replaced plus scheduled potassium given, recheck due at 9851

## 2024-02-09 NOTE — PLAN OF CARE
Goal Outcome Evaluation:   A/OX4,VSS, Frequent productive cough. Up with SBA to bathroom. Voiding well. Denies pain. LD Exp wheezes. All discharge medication explained and given to the pt. Encouraged IS. Pt use  baseline CPAP at [facility] with 1l oxygen. Pt discharging home this evening. Potassium ,mag, phos protocol. Phosphate replaced today.

## 2024-02-12 ENCOUNTER — TELEPHONE (OUTPATIENT)
Dept: FAMILY MEDICINE | Facility: CLINIC | Age: 84
End: 2024-02-12
Payer: MEDICARE

## 2024-02-12 ENCOUNTER — PATIENT OUTREACH (OUTPATIENT)
Dept: CARE COORDINATION | Facility: CLINIC | Age: 84
End: 2024-02-12
Payer: MEDICARE

## 2024-02-12 LAB
BACTERIA BLD CULT: NO GROWTH
BACTERIA BLD CULT: NO GROWTH

## 2024-02-12 NOTE — TELEPHONE ENCOUNTER
CC: Patient calling requesting to schedule a hospital follow up appt with PCP.    Patient discharged from hospital on Friday 2/9/24 due to pneumonia and was advised to schedule follow up with PCP within 1 week. Patient states his breathing is fine and overall doing much better than when in hospital. Denies any new or worsening symptoms.     Assisted with scheduling follow up appt at next available with PCP:    2/14/2024 9:00 AM (Arrive by 8:45 AM) Anne Heath MD [facility]     Advised if any new or worsening symptoms or concerns prior to above appt to callback to triage. Patient expressed verbal understanding and is agreeable.    Signing encounter.    Peace Keith RN  [facility]

## 2024-02-12 NOTE — PROGRESS NOTES
[facility]    Background: Transitional Care Management program identified per system criteria and reviewed by [facility] team for possible outreach.    Assessment:  Upon chart review, patient is in communication with a clinic team member, nurse or provider within [facility] for reason of discussing hospital follow up plan of care and answering questions patient may have related to discharge instructions. [facility] Team member will update episode status of Transitional Care Management program to enrolled per system workflows.     [facility] made first outreach attempt on 2/11/24 to reach patient for hospital follow up and left message and that time.    Plan: [facility] will make no additional outreach attempts to minimize duplicative efforts and reduce potential confusion for patient.      VERONIKA Rai  [facility], [facility]    *Connected Care Resource Team does NOT follow patient ongoing. Referrals are identified based on internal discharge reports and the outreach is to ensure patient has an understanding of their discharge instructions.

## 2024-02-14 ENCOUNTER — ANCILLARY PROCEDURE (OUTPATIENT)
Dept: GENERAL RADIOLOGY | Facility: CLINIC | Age: 84
End: 2024-02-14
Attending: INTERNAL MEDICINE
Payer: MEDICARE

## 2024-02-14 ENCOUNTER — OFFICE VISIT (OUTPATIENT)
Dept: FAMILY MEDICINE | Facility: CLINIC | Age: 84
End: 2024-02-14
Payer: MEDICARE

## 2024-02-14 VITALS
RESPIRATION RATE: 18 BRPM | HEIGHT: 72 IN | HEART RATE: 81 BPM | SYSTOLIC BLOOD PRESSURE: 128 MMHG | WEIGHT: 268.2 LBS | DIASTOLIC BLOOD PRESSURE: 76 MMHG | OXYGEN SATURATION: 92 % | TEMPERATURE: 97.6 F | BODY MASS INDEX: 36.33 KG/M2

## 2024-02-14 DIAGNOSIS — I50.32 CHRONIC HEART FAILURE WITH PRESERVED EJECTION FRACTION (H): ICD-10-CM

## 2024-02-14 DIAGNOSIS — J10.00 PNEUMONIA DUE TO INFLUENZA A VIRUS: ICD-10-CM

## 2024-02-14 DIAGNOSIS — I48.0 PAROXYSMAL A-FIB (H): ICD-10-CM

## 2024-02-14 DIAGNOSIS — I10 ESSENTIAL HYPERTENSION: ICD-10-CM

## 2024-02-14 DIAGNOSIS — N18.2 TYPE 2 DIABETES MELLITUS WITH STAGE 2 CHRONIC KIDNEY DISEASE, WITHOUT LONG-TERM CURRENT USE OF INSULIN (H): Primary | ICD-10-CM

## 2024-02-14 DIAGNOSIS — R09.02 HYPOXEMIA: ICD-10-CM

## 2024-02-14 DIAGNOSIS — R53.83 OTHER FATIGUE: ICD-10-CM

## 2024-02-14 DIAGNOSIS — E03.9 HYPOTHYROIDISM, UNSPECIFIED TYPE: ICD-10-CM

## 2024-02-14 DIAGNOSIS — E11.22 TYPE 2 DIABETES MELLITUS WITH STAGE 2 CHRONIC KIDNEY DISEASE, WITHOUT LONG-TERM CURRENT USE OF INSULIN (H): Primary | ICD-10-CM

## 2024-02-14 LAB
ALBUMIN SERPL BCG-MCNC: 4.1 G/DL (ref 3.5–5.2)
ALP SERPL-CCNC: 101 U/L (ref 40–150)
ALT SERPL W P-5'-P-CCNC: 21 U/L (ref 0–70)
ANION GAP SERPL CALCULATED.3IONS-SCNC: 12 MMOL/L (ref 7–15)
AST SERPL W P-5'-P-CCNC: 25 U/L (ref 0–45)
BASOPHILS # BLD AUTO: 0 10E3/UL (ref 0–0.2)
BASOPHILS NFR BLD AUTO: 0 %
BILIRUB SERPL-MCNC: 0.6 MG/DL
BUN SERPL-MCNC: 24.7 MG/DL (ref 8–23)
CALCIUM SERPL-MCNC: 9.8 MG/DL (ref 8.8–10.2)
CHLORIDE SERPL-SCNC: 98 MMOL/L (ref 98–107)
CREAT SERPL-MCNC: 1.02 MG/DL (ref 0.67–1.17)
DEPRECATED HCO3 PLAS-SCNC: 29 MMOL/L (ref 22–29)
EGFRCR SERPLBLD CKD-EPI 2021: 72 ML/MIN/1.73M2
EOSINOPHIL # BLD AUTO: 0.2 10E3/UL (ref 0–0.7)
EOSINOPHIL NFR BLD AUTO: 2 %
ERYTHROCYTE [DISTWIDTH] IN BLOOD BY AUTOMATED COUNT: 15.7 % (ref 10–15)
GLUCOSE SERPL-MCNC: 105 MG/DL (ref 70–99)
HCT VFR BLD AUTO: 37.3 % (ref 40–53)
HGB BLD-MCNC: 11.8 G/DL (ref 13.3–17.7)
IMM GRANULOCYTES # BLD: 0.3 10E3/UL
IMM GRANULOCYTES NFR BLD: 3 %
LYMPHOCYTES # BLD AUTO: 2.4 10E3/UL (ref 0.8–5.3)
LYMPHOCYTES NFR BLD AUTO: 20 %
MCH RBC QN AUTO: 32.2 PG (ref 26.5–33)
MCHC RBC AUTO-ENTMCNC: 31.6 G/DL (ref 31.5–36.5)
MCV RBC AUTO: 102 FL (ref 78–100)
MONOCYTES # BLD AUTO: 1 10E3/UL (ref 0–1.3)
MONOCYTES NFR BLD AUTO: 9 %
NEUTROPHILS # BLD AUTO: 8 10E3/UL (ref 1.6–8.3)
NEUTROPHILS NFR BLD AUTO: 67 %
NT-PROBNP SERPL-MCNC: 200 PG/ML (ref 0–1800)
PLATELET # BLD AUTO: 261 10E3/UL (ref 150–450)
POTASSIUM SERPL-SCNC: 4.2 MMOL/L (ref 3.4–5.3)
PROT SERPL-MCNC: 7.1 G/DL (ref 6.4–8.3)
RBC # BLD AUTO: 3.66 10E6/UL (ref 4.4–5.9)
SODIUM SERPL-SCNC: 139 MMOL/L (ref 135–145)
TSH SERPL DL<=0.005 MIU/L-ACNC: 0.58 UIU/ML (ref 0.3–4.2)
WBC # BLD AUTO: 11.9 10E3/UL (ref 4–11)

## 2024-02-14 PROCEDURE — 99214 OFFICE O/P EST MOD 30 MIN: CPT | Performed by: INTERNAL MEDICINE

## 2024-02-14 PROCEDURE — 71046 X-RAY EXAM CHEST 2 VIEWS: CPT | Mod: TC | Performed by: RADIOLOGY

## 2024-02-14 PROCEDURE — 85025 COMPLETE CBC W/AUTO DIFF WBC: CPT | Performed by: INTERNAL MEDICINE

## 2024-02-14 PROCEDURE — 83880 ASSAY OF NATRIURETIC PEPTIDE: CPT | Performed by: INTERNAL MEDICINE

## 2024-02-14 PROCEDURE — 80053 COMPREHEN METABOLIC PANEL: CPT | Performed by: INTERNAL MEDICINE

## 2024-02-14 PROCEDURE — 84443 ASSAY THYROID STIM HORMONE: CPT | Performed by: INTERNAL MEDICINE

## 2024-02-14 PROCEDURE — 36415 COLL VENOUS BLD VENIPUNCTURE: CPT | Performed by: INTERNAL MEDICINE

## 2024-02-14 ASSESSMENT — PAIN SCALES - GENERAL: PAINLEVEL: NO PAIN (0)

## 2024-02-14 NOTE — PROGRESS NOTES
Assessment & Plan   Problem List Items Addressed This Visit       Fatigue    Hypothyroidism    Relevant Orders    TSH with free T4 reflex    Paroxysmal A-fib (H)    (HFpEF) heart failure with preserved ejection fraction (H)    Relevant Orders    BNP-N terminal pro    Essential hypertension    Relevant Orders    Comprehensive metabolic panel (BMP + Alb, Alk Phos, ALT, AST, Total. Bili, TP)    Type 2 diabetes mellitus with stage 2 chronic kidney disease, without long-term current use of insulin (H) - Primary    Relevant Orders    Adult Eye  Referral     Other Visit Diagnoses       Hypoxemia        Relevant Orders    XR Chest 2 Views (Completed)    Pneumonia due to influenza A virus        Relevant Orders    XR Chest 2 Views (Completed)    CBC with platelets and differential (Completed)        Advised to resume metolazone 2.5 mg every other day.  He is on potassium supplement repeat chemistry CBC.  He has mild hypoxemia but he is back to his baseline at some soft crackles bibasilar bilateral but no use of accessory muscles or increased work of breathing will check chest x-ray.  Hemodynamics are stable.  As for the fatigue is recovering from a viral illness.  Hemodynamics are stable advised to do an eye exam for diabetic retinopathy check.  All questions answered.  Further recommendation pending lab results.     MED REC REQUIRED  Post Medication Reconciliation Status: discharge medications reconciled, continue medications without change  See Patient Instructions      Vernell Zamorano is a 83 year old, presenting for the following health issues:  Hospital F/U (Wants to make sure pneumonia is gone, soreness)        2/14/2024     8:50 AM   Additional Questions   Roomed by Willie   Accompanied by Not applicable, by themselves     [facility]       Hospital Follow-up Visit:    Hospital/Nursing Home/IP Rehab Facility: [facility]  Date of Admission:  2/7/2024   Date of Discharge: 2/9/2024    Reason(s) for Admission: Pneumonia of left lung due to infectious organism, unspecified part of lung        Was your hospitalization related to COVID-19? No   Problems taking medications regularly:  None  Medication changes since discharge:   START taking these medications     Details   cefdinir (OMNICEF) 300 MG capsule Take 1 capsule (300 mg) by mouth 2 times daily for 4 days  Qty: 8 capsule, Refills: 0     Associated Diagnoses: Bacterial pneumonia       guaiFENesin (ROBITUSSIN) 20 mg/mL liquid Take 10 mLs (200 mg) by mouth every 4 hours as needed for cough  Qty: 180 mL, Refills: 0     Associated Diagnoses: Acute cough       loratadine (CLARITIN) 10 MG tablet Take 1 tablet (10 mg) by mouth daily for 5 days  Qty: 5 tablet, Refills: 0     Associated Diagnoses: Congestion of paranasal sinus       potassium & sodium phosphates (NEUTRA-PHOS) 280-160-250 MG Packet Take 1 packet by mouth every 4 hours for 2 doses  Qty: 2 packet, Refills: 0     Associated Diagnoses: Hypophosphatemia     Problems adhering to non-medication therapy:  None    Summary of hospitalization:  [facility] discharge summary reviewed  Diagnostic Tests/Treatments reviewed.  Follow up needed:     Follow-up Appointments     Follow-up and recommended labs and tests       Follow up with primary care provider, Anne Heath, within 7 days to   evaluate medication change and for hospital follow- up.  The following   labs/tests are recommended: CBC and BMP in a week    Other Healthcare Providers Involved in Patient s Care:         None  Update since discharge: stable. Some fatigue, breathing okay            Plan of care communicated with patient           Erich is presenting for follow-up.  He was in the hospital for influenza A he was put on Tamiflu for 5 days and due to the oxygenation that he went to the hospital he was put on ceftriaxone and IV Zithromax he was discharged on cephalosporin Ceftin for 4 days no diarrhea.  His breathing  is good he is not short of breath.  His swelling in the legs are stable.  But he is not quite back to himself as he reports.  Still feeling fatigued.  Pulse ox is 91-92%.  But feels tachycardic with minimal exertion.  He has not been taking the metolazone because of slight decrease in urine output.  No dizziness no chest pain no other systemic symptoms, his phlegm production has decreased.  Not much productive cough.  He was on potassium was taking twice a day but his potassium was low in the hospital.      Review of Systems  Constitutional, HEENT, cardiovascular, pulmonary, gi and gu systems are negative, except as otherwise noted.      Objective    /76   Pulse 81   Temp 97.6  F (36.4  C) (Temporal)   Resp 18   Ht 1.829 m (6')   Wt 121.7 kg (268 lb 3.2 oz)   SpO2 92%   BMI 36.37 kg/m    Body mass index is 36.37 kg/m .  Physical Exam   GENERAL: alert and no distress  EYES: Eyes grossly normal to inspection, PERRL and conjunctivae and sclerae normal  HENT: ear canals and TM's normal, nose and mouth without ulcers or lesions  NECK: no adenopathy, no asymmetry, masses, or scars  RESP: Mild basal rales bilateral basally, no use of accessory muscles no wheezing otherwise good air entry bilateral  CV: regular rate and rhythm, normal S1 S2, no S3 or S4, no murmur, click or rub, no peripheral edema  ABDOMEN: soft, nontender, no hepatosplenomegaly, no masses and bowel sounds normal  MS: no gross musculoskeletal defects noted, no edema  SKIN: no suspicious lesions or rashes  NEURO: Normal strength and tone, mentation intact and speech normal  PSYCH: mentation appears normal, affect normal/bright    Admission on 02/07/2024, Discharged on 02/09/2024   Component Date Value Ref Range Status    Ventricular Rate 02/07/2024 90  BPM Final    Atrial Rate 02/07/2024 90  BPM Final    IN Interval 02/07/2024 194  ms Final    QRS Duration 02/07/2024 106  ms Final    QT 02/07/2024 352  ms Final    QTc 02/07/2024 430  ms Final     P Axis 02/07/2024 43  degrees Final    R AXIS 02/07/2024 46  degrees Final    T Axis 02/07/2024 14  degrees Final    Interpretation ECG 02/07/2024    Final                    Value:Sinus rhythm  Cannot rule out Anterior infarct , age undetermined  T wave abnormality, consider inferior ischemia  Abnormal ECG  When compared with ECG of 06-NOV-2023 13:28,  No significant change was found  Confirmed by GENERATED REPORT, COMPUTER ([id number]),  Sherin García ([id number]) on 2/7/2024 3:15:37 PM      Sodium 02/07/2024 135  135 - 145 mmol/L Final    Reference intervals for this test were updated on 09/26/2023 to more accurately reflect our healthy population. There may be differences in the flagging of prior results with similar values performed with this method. Interpretation of those prior results can be made in the context of the updated reference intervals.     Potassium 02/07/2024 3.1 (L)  3.4 - 5.3 mmol/L Final    Carbon Dioxide (CO2) 02/07/2024 32 (H)  22 - 29 mmol/L Final    Anion Gap 02/07/2024 10  7 - 15 mmol/L Final    Urea Nitrogen 02/07/2024 31.5 (H)  8.0 - 23.0 mg/dL Final    Creatinine 02/07/2024 1.28 (H)  0.67 - 1.17 mg/dL Final    GFR Estimate 02/07/2024 55 (L)  >60 mL/min/1.73m2 Final    Calcium 02/07/2024 9.3  8.8 - 10.2 mg/dL Final    Chloride 02/07/2024 93 (L)  98 - 107 mmol/L Final    Glucose 02/07/2024 115 (H)  70 - 99 mg/dL Final    Alkaline Phosphatase 02/07/2024 115  40 - 150 U/L Final    Reference intervals for this test were updated on 11/14/2023 to more accurately reflect our healthy population. There may be differences in the flagging of prior results with similar values performed with this method. Interpretation of those prior results can be made in the context of the updated reference intervals.    AST 02/07/2024 21  0 - 45 U/L Final    Reference intervals for this test were updated on 6/12/2023 to more accurately reflect our healthy population. There may be differences in the flagging of  prior results with similar values performed with this method. Interpretation of those prior results can be made in the context of the updated reference intervals.    ALT 02/07/2024 20  0 - 70 U/L Final    Reference intervals for this test were updated on 6/12/2023 to more accurately reflect our healthy population. There may be differences in the flagging of prior results with similar values performed with this method. Interpretation of those prior results can be made in the context of the updated reference intervals.      Protein Total 02/07/2024 7.1  6.4 - 8.3 g/dL Final    Albumin 02/07/2024 4.2  3.5 - 5.2 g/dL Final    Bilirubin Total 02/07/2024 0.7  <=1.2 mg/dL Final    Troponin T, High Sensitivity 02/07/2024 50 (H)  <=22 ng/L Final    Either a High Sensitivity Troponin T baseline (0 hours) value = 100 ng/L, or an increase in High Sensitivity Troponin T = 7 ng/L at 2 hours compared to 0 hours (2-0 hours), suggests myocardial injury, and urgent clinical attention is required.    If the 2-0 hours increase is <7 ng/L, a High Sensitivity Troponin T result above gender-specific reference ranges warrants further evaluation.   Recommendations for further evaluation include correlation with clinical decision-making tool (e.g., HEART), a 3rd High Sensitivity Troponin T test 2 hours after the 2nd (a 20% change from baseline would represent concern), admission for observation, close PCC/cardiology follow-up, or urgent outpatient provocative testing.    Magnesium 02/07/2024 2.1  1.7 - 2.3 mg/dL Final    Procalcitonin 02/07/2024 0.09  <0.50 ng/mL Final    Comment: Interpretation and Recommendations     <0.5 ng/mL:   Systemic bacterial infection unlikely. Local bacterial infection is possible.     0.5-1.99 ng/mL:   Systemic bacterial infection possible, but various other conditions are known to induce PCT as well.     >=2.00 ng/mL:   Systemic bacterial infection likely, unless other causes are known.     Decision to start  antibiotics should not be based on procalcitonin level alone. See Procalcitonin Guidance document for more details. https://[employer] ([employer]com/files/[facility]/documents/adult-procalcitonin-guidance-on-pzttqyeocex35667.pdf    Factors that may affect PCT levels (not all-inclusive):     - Increased PCT level           Severe trauma/burns           Invasive surgery           Cooling therapy after cardiac arrest/surgery           Treatment with agents which stimulate cytokines           Acute kidney injury           Chronic kidney disease and end stage renal disease           Acute graft vs host disease           Non-specific shock                            causing decreased organ perfusion and/or infarction       - Normal or unchanged PCT level           Early in infections (if low and infection is suspected, repeating in 6-12 hours is recommended)           Chronic infections (endocarditis, osteomyelitis, prosthetic device/graft infections)           Localized infections (cellulitis, wound infections, intra-abdominal abscess)     Note: PCT has not been extensively studied in pregnancy/breastfeeding, pediatrics, severe immunosuppression, and cystic fibrosis.    N terminal Pro BNP Inpatient 02/07/2024 38  0 - 1,800 pg/mL Final    Reference range shown and results flagged as abnormal are suggested inpatient cut points for confirming diagnosis if CHF in an acute setting. Establishing a baseline value for each individual patient is useful for follow-up. An inpatient or emergency department NT-proPBNP <300 pg/mL effectively rules out acute CHF, with 99% negative predictive value.    The outpatient non-acute reference range for ruling out CHF is:  0-125 pg/mL (age 18 to less than 75)  0-450 pg/mL (age 75 yrs and older)     Culture 02/07/2024 No Growth   Final    Culture 02/07/2024 No Growth   Final    WBC Count 02/07/2024 12.9 (H)  4.0 - 11.0 10e3/uL Final    RBC Count 02/07/2024 3.89 (L)  4.40 - 5.90 10e6/uL Final    Hemoglobin  02/07/2024 12.5 (L)  13.3 - 17.7 g/dL Final    Hematocrit 02/07/2024 38.0 (L)  40.0 - 53.0 % Final    MCV 02/07/2024 98  78 - 100 fL Final    MCH 02/07/2024 32.1  26.5 - 33.0 pg Final    MCHC 02/07/2024 32.9  31.5 - 36.5 g/dL Final    RDW 02/07/2024 15.8 (H)  10.0 - 15.0 % Final    Platelet Count 02/07/2024 157  150 - 450 10e3/uL Final    % Neutrophils 02/07/2024 83  % Final    % Lymphocytes 02/07/2024 9  % Final    % Monocytes 02/07/2024 7  % Final    % Eosinophils 02/07/2024 0  % Final    % Basophils 02/07/2024 0  % Final    % Immature Granulocytes 02/07/2024 1  % Final    NRBCs per 100 WBC 02/07/2024 0  <1 /100 Final    Absolute Neutrophils 02/07/2024 10.8 (H)  1.6 - 8.3 10e3/uL Final    Absolute Lymphocytes 02/07/2024 1.2  0.8 - 5.3 10e3/uL Final    Absolute Monocytes 02/07/2024 0.8  0.0 - 1.3 10e3/uL Final    Absolute Eosinophils 02/07/2024 0.0  0.0 - 0.7 10e3/uL Final    Absolute Basophils 02/07/2024 0.0  0.0 - 0.2 10e3/uL Final    Absolute Immature Granulocytes 02/07/2024 0.1  <=0.4 10e3/uL Final    Absolute NRBCs 02/07/2024 0.0  10e3/uL Final    Lactic Acid POCT 02/07/2024 1.1  <=2.0 mmol/L Final    Bicarbonate Venous POCT 02/07/2024 32 (H)  21 - 28 mmol/L Final    O2 Sat, Venous POCT 02/07/2024 57 (L)  70 - 75 % Final    pCO2 Venous POCT 02/07/2024 48  40 - 50 mm Hg Final    pH Venous POCT 02/07/2024 7.43  7.32 - 7.43 Final    pO2 Venous POCT 02/07/2024 29  25 - 47 mm Hg Final    SARS CoV2 PCR 02/07/2024 Negative  Negative Final    NEGATIVE: SARS-CoV-2 (COVID-19) RNA not detected, presumed negative.    Troponin T, High Sensitivity 02/07/2024 45 (H)  <=22 ng/L Final    Either a High Sensitivity Troponin T baseline (0 hours) value = 100 ng/L, or an increase in High Sensitivity Troponin T = 7 ng/L at 2 hours compared to 0 hours (2-0 hours), suggests myocardial injury, and urgent clinical attention is required.    If the 2-0 hours increase is <7 ng/L, a High Sensitivity Troponin T result above gender-specific  reference ranges warrants further evaluation.   Recommendations for further evaluation include correlation with clinical decision-making tool (e.g., HEART), a 3rd High Sensitivity Troponin T test 2 hours after the 2nd (a 20% change from baseline would represent concern), admission for observation, close PCC/cardiology follow-up, or urgent outpatient provocative testing.    Phosphorus 02/07/2024 2.1 (L)  2.5 - 4.5 mg/dL Final    Potassium 02/07/2024 3.2 (L)  3.4 - 5.3 mmol/L Final    Phosphorus 02/08/2024 2.7  2.5 - 4.5 mg/dL Final    Magnesium 02/08/2024 1.9  1.7 - 2.3 mg/dL Final    WBC Count 02/08/2024 14.6 (H)  4.0 - 11.0 10e3/uL Final    RBC Count 02/08/2024 3.76 (L)  4.40 - 5.90 10e6/uL Final    Hemoglobin 02/08/2024 12.4 (L)  13.3 - 17.7 g/dL Final    Hematocrit 02/08/2024 36.8 (L)  40.0 - 53.0 % Final    MCV 02/08/2024 98  78 - 100 fL Final    MCH 02/08/2024 33.0  26.5 - 33.0 pg Final    MCHC 02/08/2024 33.7  31.5 - 36.5 g/dL Final    RDW 02/08/2024 15.8 (H)  10.0 - 15.0 % Final    Platelet Count 02/08/2024 137 (L)  150 - 450 10e3/uL Final    Sodium 02/08/2024 136  135 - 145 mmol/L Final    Reference intervals for this test were updated on 09/26/2023 to more accurately reflect our healthy population. There may be differences in the flagging of prior results with similar values performed with this method. Interpretation of those prior results can be made in the context of the updated reference intervals.     Potassium 02/08/2024 2.8 (L)  3.4 - 5.3 mmol/L Final    Chloride 02/08/2024 93 (L)  98 - 107 mmol/L Final    Carbon Dioxide (CO2) 02/08/2024 30 (H)  22 - 29 mmol/L Final    Anion Gap 02/08/2024 13  7 - 15 mmol/L Final    Urea Nitrogen 02/08/2024 22.3  8.0 - 23.0 mg/dL Final    Creatinine 02/08/2024 1.03  0.67 - 1.17 mg/dL Final    GFR Estimate 02/08/2024 72  >60 mL/min/1.73m2 Final    Calcium 02/08/2024 9.3  8.8 - 10.2 mg/dL Final    Glucose 02/08/2024 102 (H)  70 - 99 mg/dL Final    Potassium 02/08/2024  3.2 (L)  3.4 - 5.3 mmol/L Final    Potassium 02/08/2024 3.3 (L)  3.4 - 5.3 mmol/L Final    Sodium 02/09/2024 135  135 - 145 mmol/L Final    Reference intervals for this test were updated on 09/26/2023 to more accurately reflect our healthy population. There may be differences in the flagging of prior results with similar values performed with this method. Interpretation of those prior results can be made in the context of the updated reference intervals.     Potassium 02/09/2024 3.5  3.4 - 5.3 mmol/L Final    Chloride 02/09/2024 94 (L)  98 - 107 mmol/L Final    Carbon Dioxide (CO2) 02/09/2024 32 (H)  22 - 29 mmol/L Final    Anion Gap 02/09/2024 9  7 - 15 mmol/L Final    Urea Nitrogen 02/09/2024 29.7 (H)  8.0 - 23.0 mg/dL Final    Creatinine 02/09/2024 1.20 (H)  0.67 - 1.17 mg/dL Final    GFR Estimate 02/09/2024 60 (L)  >60 mL/min/1.73m2 Final    Calcium 02/09/2024 9.0  8.8 - 10.2 mg/dL Final    Glucose 02/09/2024 112 (H)  70 - 99 mg/dL Final    WBC Count 02/09/2024 9.9  4.0 - 11.0 10e3/uL Final    RBC Count 02/09/2024 3.52 (L)  4.40 - 5.90 10e6/uL Final    Hemoglobin 02/09/2024 11.3 (L)  13.3 - 17.7 g/dL Final    Hematocrit 02/09/2024 34.6 (L)  40.0 - 53.0 % Final    MCV 02/09/2024 98  78 - 100 fL Final    MCH 02/09/2024 32.1  26.5 - 33.0 pg Final    MCHC 02/09/2024 32.7  31.5 - 36.5 g/dL Final    RDW 02/09/2024 15.9 (H)  10.0 - 15.0 % Final    Platelet Count 02/09/2024 147 (L)  150 - 450 10e3/uL Final    % Neutrophils 02/09/2024 73  % Final    % Lymphocytes 02/09/2024 19  % Final    % Monocytes 02/09/2024 6  % Final    % Eosinophils 02/09/2024 1  % Final    % Basophils 02/09/2024 0  % Final    % Immature Granulocytes 02/09/2024 1  % Final    NRBCs per 100 WBC 02/09/2024 0  <1 /100 Final    Absolute Neutrophils 02/09/2024 7.1  1.6 - 8.3 10e3/uL Final    Absolute Lymphocytes 02/09/2024 1.9  0.8 - 5.3 10e3/uL Final    Absolute Monocytes 02/09/2024 0.6  0.0 - 1.3 10e3/uL Final    Absolute Eosinophils 02/09/2024 0.1   0.0 - 0.7 10e3/uL Final    Absolute Basophils 02/09/2024 0.0  0.0 - 0.2 10e3/uL Final    Absolute Immature Granulocytes 02/09/2024 0.1  <=0.4 10e3/uL Final    Absolute NRBCs 02/09/2024 0.0  10e3/uL Final    Magnesium 02/09/2024 2.0  1.7 - 2.3 mg/dL Final    Phosphorus 02/09/2024 2.4 (L)  2.5 - 4.5 mg/dL Final           Signed Electronically by: Anne Heath MD

## 2024-02-20 ENCOUNTER — HOSPITAL ENCOUNTER (OUTPATIENT)
Dept: CARDIAC REHAB | Facility: CLINIC | Age: 84
Discharge: HOME | End: 2024-02-20
Attending: INTERNAL MEDICINE
Payer: MEDICARE

## 2024-02-20 PROCEDURE — 94625 PHY/QHP OP PULM RHB W/O MNTR: CPT

## 2024-02-21 ENCOUNTER — TRANSFERRED RECORDS (OUTPATIENT)
Dept: HEALTH INFORMATION MANAGEMENT | Facility: CLINIC | Age: 84
End: 2024-02-21

## 2024-02-22 ENCOUNTER — HOSPITAL ENCOUNTER (OUTPATIENT)
Dept: CARDIAC REHAB | Facility: CLINIC | Age: 84
Discharge: HOME | End: 2024-02-22
Attending: INTERNAL MEDICINE
Payer: MEDICARE

## 2024-02-22 DIAGNOSIS — E87.6 HYPOKALEMIA: ICD-10-CM

## 2024-02-22 PROCEDURE — 94625 PHY/QHP OP PULM RHB W/O MNTR: CPT | Performed by: CLINICAL EXERCISE PHYSIOLOGIST

## 2024-02-22 RX ORDER — POTASSIUM CHLORIDE 750 MG/1
10 TABLET, EXTENDED RELEASE ORAL 2 TIMES DAILY
Qty: 180 TABLET | Refills: 1 | Status: SHIPPED | OUTPATIENT
Start: 2024-02-22 | End: 2024-05-08

## 2024-03-26 ENCOUNTER — ANCILLARY PROCEDURE (OUTPATIENT)
Dept: CARDIOLOGY | Facility: CLINIC | Age: 84
End: 2024-03-26
Attending: INTERNAL MEDICINE
Payer: MEDICARE

## 2024-03-26 DIAGNOSIS — I49.5 SICK SINUS SYNDROME (H): ICD-10-CM

## 2024-03-26 DIAGNOSIS — Z95.0 CARDIAC PACEMAKER IN SITU: ICD-10-CM

## 2024-03-26 PROCEDURE — 93296 REM INTERROG EVL PM/IDS: CPT | Performed by: INTERNAL MEDICINE

## 2024-03-26 PROCEDURE — 93294 REM INTERROG EVL PM/LDLS PM: CPT | Performed by: INTERNAL MEDICINE

## 2024-03-28 LAB
MDC_IDC_EPISODE_DTM: NORMAL
MDC_IDC_EPISODE_DURATION: 10 S
MDC_IDC_EPISODE_DURATION: 10 S
MDC_IDC_EPISODE_DURATION: 12 S
MDC_IDC_EPISODE_DURATION: 16 S
MDC_IDC_EPISODE_DURATION: 18 S
MDC_IDC_EPISODE_DURATION: 4 S
MDC_IDC_EPISODE_DURATION: 5 S
MDC_IDC_EPISODE_DURATION: 5 S
MDC_IDC_EPISODE_DURATION: 6 S
MDC_IDC_EPISODE_DURATION: 7 S
MDC_IDC_EPISODE_ID: NORMAL
MDC_IDC_EPISODE_TYPE: NORMAL
MDC_IDC_EPISODE_TYPE_INDUCED: NO
MDC_IDC_LEAD_CONNECTION_STATUS: NORMAL
MDC_IDC_LEAD_CONNECTION_STATUS: NORMAL
MDC_IDC_LEAD_IMPLANT_DT: NORMAL
MDC_IDC_LEAD_IMPLANT_DT: NORMAL
MDC_IDC_LEAD_LOCATION: NORMAL
MDC_IDC_LEAD_LOCATION: NORMAL
MDC_IDC_LEAD_LOCATION_DETAIL_1: NORMAL
MDC_IDC_LEAD_LOCATION_DETAIL_1: NORMAL
MDC_IDC_LEAD_MFG: NORMAL
MDC_IDC_LEAD_MFG: NORMAL
MDC_IDC_LEAD_MODEL: NORMAL
MDC_IDC_LEAD_MODEL: NORMAL
MDC_IDC_LEAD_POLARITY_TYPE: NORMAL
MDC_IDC_LEAD_POLARITY_TYPE: NORMAL
MDC_IDC_LEAD_SERIAL: NORMAL
MDC_IDC_LEAD_SERIAL: NORMAL
MDC_IDC_MSMT_BATTERY_DTM: NORMAL
MDC_IDC_MSMT_BATTERY_REMAINING_LONGEVITY: 4 MO
MDC_IDC_MSMT_BATTERY_REMAINING_PERCENTAGE: 6 %
MDC_IDC_MSMT_BATTERY_STATUS: NORMAL
MDC_IDC_MSMT_LEADCHNL_RA_IMPEDANCE_VALUE: 430 OHM
MDC_IDC_MSMT_LEADCHNL_RA_PACING_THRESHOLD_AMPLITUDE: 0.4 V
MDC_IDC_MSMT_LEADCHNL_RA_PACING_THRESHOLD_PULSEWIDTH: 0.4 MS
MDC_IDC_MSMT_LEADCHNL_RV_IMPEDANCE_VALUE: 404 OHM
MDC_IDC_PG_IMPLANT_DTM: NORMAL
MDC_IDC_PG_MFG: NORMAL
MDC_IDC_PG_MODEL: NORMAL
MDC_IDC_PG_SERIAL: NORMAL
MDC_IDC_PG_TYPE: NORMAL
MDC_IDC_SESS_CLINIC_NAME: NORMAL
MDC_IDC_SESS_DTM: NORMAL
MDC_IDC_SESS_TYPE: NORMAL
MDC_IDC_SET_BRADY_AT_MODE_SWITCH_MODE: NORMAL
MDC_IDC_SET_BRADY_AT_MODE_SWITCH_RATE: 150 {BEATS}/MIN
MDC_IDC_SET_BRADY_LOWRATE: 60 {BEATS}/MIN
MDC_IDC_SET_BRADY_MAX_SENSOR_RATE: 130 {BEATS}/MIN
MDC_IDC_SET_BRADY_MAX_TRACKING_RATE: 130 {BEATS}/MIN
MDC_IDC_SET_BRADY_MODE: NORMAL
MDC_IDC_SET_BRADY_PAV_DELAY_HIGH: 220 MS
MDC_IDC_SET_BRADY_PAV_DELAY_LOW: 250 MS
MDC_IDC_SET_BRADY_SAV_DELAY_HIGH: 220 MS
MDC_IDC_SET_BRADY_SAV_DELAY_LOW: 250 MS
MDC_IDC_SET_LEADCHNL_RA_PACING_AMPLITUDE: 2 V
MDC_IDC_SET_LEADCHNL_RA_PACING_POLARITY: NORMAL
MDC_IDC_SET_LEADCHNL_RA_PACING_PULSEWIDTH: 0.4 MS
MDC_IDC_SET_LEADCHNL_RA_SENSING_ADAPTATION_MODE: NORMAL
MDC_IDC_SET_LEADCHNL_RA_SENSING_POLARITY: NORMAL
MDC_IDC_SET_LEADCHNL_RA_SENSING_SENSITIVITY: 0.5 MV
MDC_IDC_SET_LEADCHNL_RV_PACING_AMPLITUDE: 3.2 V
MDC_IDC_SET_LEADCHNL_RV_PACING_CAPTURE_MODE: NORMAL
MDC_IDC_SET_LEADCHNL_RV_PACING_POLARITY: NORMAL
MDC_IDC_SET_LEADCHNL_RV_PACING_PULSEWIDTH: 0.4 MS
MDC_IDC_SET_LEADCHNL_RV_SENSING_ADAPTATION_MODE: NORMAL
MDC_IDC_SET_LEADCHNL_RV_SENSING_POLARITY: NORMAL
MDC_IDC_SET_LEADCHNL_RV_SENSING_SENSITIVITY: 1 MV
MDC_IDC_SET_ZONE_DETECTION_INTERVAL: 375 MS
MDC_IDC_SET_ZONE_STATUS: NORMAL
MDC_IDC_SET_ZONE_TYPE: NORMAL
MDC_IDC_SET_ZONE_VENDOR_TYPE: NORMAL
MDC_IDC_STAT_AT_BURDEN_PERCENT: 1 %
MDC_IDC_STAT_AT_DTM_END: NORMAL
MDC_IDC_STAT_AT_DTM_START: NORMAL
MDC_IDC_STAT_BRADY_DTM_END: NORMAL
MDC_IDC_STAT_BRADY_DTM_START: NORMAL
MDC_IDC_STAT_BRADY_RA_PERCENT_PACED: 8 %
MDC_IDC_STAT_BRADY_RV_PERCENT_PACED: 0 %
MDC_IDC_STAT_EPISODE_RECENT_COUNT: 0
MDC_IDC_STAT_EPISODE_RECENT_COUNT: 17
MDC_IDC_STAT_EPISODE_RECENT_COUNT_DTM_END: NORMAL
MDC_IDC_STAT_EPISODE_RECENT_COUNT_DTM_END: NORMAL
MDC_IDC_STAT_EPISODE_RECENT_COUNT_DTM_START: NORMAL
MDC_IDC_STAT_EPISODE_RECENT_COUNT_DTM_START: NORMAL
MDC_IDC_STAT_EPISODE_TYPE: NORMAL
MDC_IDC_STAT_EPISODE_TYPE: NORMAL
MDC_IDC_STAT_EPISODE_VENDOR_TYPE: NORMAL
MDC_IDC_STAT_EPISODE_VENDOR_TYPE: NORMAL

## 2024-04-01 ENCOUNTER — MYC MEDICAL ADVICE (OUTPATIENT)
Dept: NEPHROLOGY | Facility: CLINIC | Age: 84
End: 2024-04-01
Payer: MEDICARE

## 2024-04-01 DIAGNOSIS — I10 PRIMARY HYPERTENSION: ICD-10-CM

## 2024-04-01 RX ORDER — LISINOPRIL 20 MG/1
20 TABLET ORAL DAILY
Qty: 90 TABLET | Refills: 3 | Status: SHIPPED | OUTPATIENT
Start: 2024-04-01

## 2024-04-05 ENCOUNTER — MYC MEDICAL ADVICE (OUTPATIENT)
Dept: FAMILY MEDICINE | Facility: CLINIC | Age: 84
End: 2024-04-05
Payer: MEDICARE

## 2024-04-05 ENCOUNTER — NURSE TRIAGE (OUTPATIENT)
Dept: FAMILY MEDICINE | Facility: CLINIC | Age: 84
End: 2024-04-05
Payer: MEDICARE

## 2024-04-05 NOTE — TELEPHONE ENCOUNTER
Nurse Triage SBAR    Is this a 2nd Level Triage? YES, LICENSED PRACTITIONER REVIEW IS REQUIRED    Situation: LE swelling to top of the calf bilaterally. Feet and ankles have edema as well. No pitting edema. Afebrile, some redness on legs. Not warmer than usual.     Background: had cellulitis in the past    Assessment: LE swelling to top of the calf bilaterally. Feet and ankles have edema as well. No pitting edema. Afebrile, some redness on legs. Not warmer than usual.     Protocol Recommended Disposition:   No disposition on file.    Recommendation: Writer scheduled patient as he wants to see PCP. Writer advised is worsening symptoms, to go to UC/ED over the weekend or before appointment. Patient verbalized understanding.  Please advise if ok to wait until Wednesday.     Appointments in Next Year      Apr 10, 2024  3:00 PM  (Arrive by 2:40 PM)  Provider Visit with Anne Heath MD  [facility] ([facility] ) [phone]         Routed to provider    Does the patient meet one of the following criteria for ADS visit consideration? 16+ years old, with an [facility] PCP     TIP  Providers, please consider if this condition is appropriate for management at one of our Acute and Diagnostic Services sites.     If patient is a good candidate, please use dotphrase <dot>triageresponse and select Refer to ADS to document.

## 2024-04-05 NOTE — TELEPHONE ENCOUNTER
Jaun LANG  Triage Im (supporting Anne Heath MD)34 minutes ago (2:25 PM)       Moose Heath,                             Hope all is well with you.   This seems like the best way to communicate with you.                                I am having some problems with the legs swelling again.  And before I get another referral to for the lymphedema treatment,  I thought perhaps I could see you and have a blood screen to see if the meds I am taking could be contributing to the swelling.  I set up an appointment with Rosalba Glze to review my meds but that is not until mid May.    Thanks for any advice or assistance you can offer.   Gratefully,                              Jaun Craven  Reason for Disposition    Patient wants to be seen    Additional Information    Negative: Sounds like a life-threatening emergency to the triager    Negative: Chest pain    Negative: Followed an insect bite and has localized swelling (e.g., small area of puffy or swollen skin)    Negative: Followed a knee injury    Negative: Ankle or foot injury    Negative: Pregnant with leg swelling or edema    Negative: Difficulty breathing at rest    Negative: Entire foot is cool or blue in comparison to other side    Negative: SEVERE swelling (e.g., swelling extends above knee, entire leg is swollen, weeping fluid)    Negative: Cast on leg or ankle and has increasing pain    Negative: Can't walk or can barely stand (new-onset)    Negative: Fever and red area (or area very tender to touch)    Negative: Patient sounds very sick or weak to the triager    Negative: Swelling of face, arm or hands  (Exception: Slight puffiness of fingers during hot weather.)    Negative: Pregnant 20 or more weeks and sudden weight gain (i.e., > 2 lbs, 1 kg in one week)    Negative: Thigh or calf pain and only 1 side and present > 1 hour    Negative: Thigh, calf, or ankle swelling in only one leg    Negative: Thigh, calf, or ankle swelling in  "both legs, but one side is definitely more swollen (Exception: Longstanding difference between legs.)    Negative: MODERATE swelling of both ankles (e.g., swelling extends up to the knees) AND new-onset or worsening    Negative: Difficulty breathing with exertion AND worsening or new-onset    Negative: Looks like a boil, infected sore, deep ulcer, or other infected rash (spreading redness, pus)    Answer Assessment - Initial Assessment Questions  1. ONSET: \"When did the swelling start?\" (e.g., minutes, hours, days)      About a week ago and I was trying my compression socks  2. LOCATION: \"What part of the leg is swollen?\"  \"Are both legs swollen or just one leg?\"      Below the knees and in feet as well, more the left side than the right.  3. SEVERITY: \"How bad is the swelling?\" (e.g., localized; mild, moderate, severe)    - Localized: Small area of swelling localized to one leg.    - MILD pedal edema: Swelling limited to foot and ankle, pitting edema < 1/4 inch (6 mm) deep, rest and elevation eliminate most or all swelling.    - MODERATE edema: Swelling of lower leg to knee, pitting edema > 1/4 inch (6 mm) deep, rest and elevation only partially reduce swelling.    - SEVERE edema: Swelling extends above knee, facial or hand swelling present.       Does not stay pushed in if I push on it  4. REDNESS: \"Does the swelling look red or infected?\"      Yes, there is some redness  5. PAIN: \"Is the swelling painful to touch?\" If Yes, ask: \"How painful is it?\"   (Scale 1-10; mild, moderate or severe)      Yes, it is painful. About 5-6/10 for pain.  6. FEVER: \"Do you have a fever?\" If Yes, ask: \"What is it, how was it measured, and when did it start?\"       I don't think so.  7. CAUSE: \"What do you think is causing the leg swelling?\"      I don't know  8. MEDICAL HISTORY: \"Do you have a history of blood clots (e.g., DVT), cancer, heart failure, kidney disease, or liver failure?\"      I've only had one blood clot in 2012 from " "a procedure.   9. RECURRENT SYMPTOM: \"Have you had leg swelling before?\" If Yes, ask: \"When was the last time?\" \"What happened that time?\"      Yes, it really started after the last shoulder surgery, which was a year ago in February  10. OTHER SYMPTOMS: \"Do you have any other symptoms?\" (e.g., chest pain, difficulty breathing)        no  11. PREGNANCY: \"Is there any chance you are pregnant?\" \"When was your last menstrual period?\"        N/A    Protocols used: Leg Swelling and Edema-A-[place]    "

## 2024-04-06 ENCOUNTER — OFFICE VISIT (OUTPATIENT)
Dept: URGENT CARE | Facility: URGENT CARE | Age: 84
End: 2024-04-06
Payer: MEDICARE

## 2024-04-06 VITALS
BODY MASS INDEX: 36.62 KG/M2 | TEMPERATURE: 97.8 F | WEIGHT: 270 LBS | HEART RATE: 87 BPM | OXYGEN SATURATION: 96 % | DIASTOLIC BLOOD PRESSURE: 72 MMHG | SYSTOLIC BLOOD PRESSURE: 145 MMHG | RESPIRATION RATE: 18 BRPM

## 2024-04-06 DIAGNOSIS — I50.32 CHRONIC DIASTOLIC HEART FAILURE (H): ICD-10-CM

## 2024-04-06 DIAGNOSIS — R60.0 BILATERAL LOWER EXTREMITY EDEMA: Primary | ICD-10-CM

## 2024-04-06 LAB
ANION GAP SERPL CALCULATED.3IONS-SCNC: 14 MMOL/L (ref 7–15)
BASOPHILS # BLD AUTO: 0 10E3/UL (ref 0–0.2)
BASOPHILS NFR BLD AUTO: 0 %
BUN SERPL-MCNC: 21.5 MG/DL (ref 8–23)
CALCIUM SERPL-MCNC: 10.2 MG/DL (ref 8.8–10.2)
CHLORIDE SERPL-SCNC: 96 MMOL/L (ref 98–107)
CREAT SERPL-MCNC: 1.05 MG/DL (ref 0.67–1.17)
DEPRECATED HCO3 PLAS-SCNC: 30 MMOL/L (ref 22–29)
EGFRCR SERPLBLD CKD-EPI 2021: 70 ML/MIN/1.73M2
EOSINOPHIL # BLD AUTO: 0.1 10E3/UL (ref 0–0.7)
EOSINOPHIL NFR BLD AUTO: 1 %
ERYTHROCYTE [DISTWIDTH] IN BLOOD BY AUTOMATED COUNT: 14.5 % (ref 10–15)
GLUCOSE SERPL-MCNC: 110 MG/DL (ref 70–99)
HCT VFR BLD AUTO: 41.2 % (ref 40–53)
HGB BLD-MCNC: 13.6 G/DL (ref 13.3–17.7)
IMM GRANULOCYTES # BLD: 0 10E3/UL
IMM GRANULOCYTES NFR BLD: 0 %
LYMPHOCYTES # BLD AUTO: 2.1 10E3/UL (ref 0.8–5.3)
LYMPHOCYTES NFR BLD AUTO: 20 %
MCH RBC QN AUTO: 32.6 PG (ref 26.5–33)
MCHC RBC AUTO-ENTMCNC: 33 G/DL (ref 31.5–36.5)
MCV RBC AUTO: 99 FL (ref 78–100)
MONOCYTES # BLD AUTO: 1 10E3/UL (ref 0–1.3)
MONOCYTES NFR BLD AUTO: 9 %
NEUTROPHILS # BLD AUTO: 7.3 10E3/UL (ref 1.6–8.3)
NEUTROPHILS NFR BLD AUTO: 69 %
PLATELET # BLD AUTO: 201 10E3/UL (ref 150–450)
POTASSIUM SERPL-SCNC: 3.3 MMOL/L (ref 3.4–5.3)
RBC # BLD AUTO: 4.17 10E6/UL (ref 4.4–5.9)
SODIUM SERPL-SCNC: 140 MMOL/L (ref 135–145)
WBC # BLD AUTO: 10.6 10E3/UL (ref 4–11)

## 2024-04-06 PROCEDURE — 99214 OFFICE O/P EST MOD 30 MIN: CPT | Performed by: PHYSICIAN ASSISTANT

## 2024-04-06 PROCEDURE — 85025 COMPLETE CBC W/AUTO DIFF WBC: CPT | Performed by: PHYSICIAN ASSISTANT

## 2024-04-06 PROCEDURE — 36415 COLL VENOUS BLD VENIPUNCTURE: CPT | Performed by: PHYSICIAN ASSISTANT

## 2024-04-06 PROCEDURE — 80048 BASIC METABOLIC PNL TOTAL CA: CPT | Performed by: PHYSICIAN ASSISTANT

## 2024-04-06 RX ORDER — METOLAZONE 2.5 MG/1
2.5 TABLET ORAL 2 TIMES DAILY
COMMUNITY
Start: 2024-04-06 | End: 2024-05-20

## 2024-04-06 RX ORDER — DOXYCYCLINE 100 MG/1
100 CAPSULE ORAL 2 TIMES DAILY
Qty: 14 CAPSULE | Refills: 0 | Status: SHIPPED | OUTPATIENT
Start: 2024-04-06 | End: 2024-04-13

## 2024-04-06 NOTE — PROGRESS NOTES
Assessment & Plan     1. Bilateral lower extremity edema    - Basic metabolic panel  (Ca, Cl, CO2, Creat, Gluc, K, Na, BUN); Future  - CBC with platelets and differential; Future  - metolazone (ZAROXOLYN) 2.5 MG tablet; Take 2 tablets (5 mg) by mouth 2 times daily  - doxycycline hyclate (VIBRAMYCIN) 100 MG capsule; Take 1 capsule (100 mg) by mouth 2 times daily for 7 days  Dispense: 14 capsule; Refill: 0  - Basic metabolic panel  (Ca, Cl, CO2, Creat, Gluc, K, Na, BUN)  - CBC with platelets and differential    2. Chronic diastolic heart failure (H)    - metolazone (ZAROXOLYN) 2.5 MG tablet; Take 2 tablets (5 mg) by mouth 2 times daily     An additional furosemide today and tomorrow with increased Zaroxolyn to 5 mg 2x daily until seen next week with PCP. Start doxycycline. Elevated legs and use compression stockings if possible. Potassium low at 3.3 which may decline further with dose increase. He will take 30 meq of potassium daily until seen by PCP.                   Erich Bustos PA-C  [facility] HERNAN Zamorano is a 83 year old male who presents to clinic today for the following health issues:  Chief Complaint   Patient presents with    Urgent Care     Bilateral legs are swollen, redness and warm to the touch x 4-5 days      HPI    Here for bilateral leg swelling with history of lymphedema, chronic diastolic heart failure. Taking furosemide 40 mg 2x daily and metolazone 2.5 mg 2x daily. Also takes spironolactone 25 mg every other day. Started one week ago with much more swelling to legs. No chest pain or shortness of breath. Some nausea. He feels legs are 2x as large as they were about 2 months ago post hospitalization. 20 meq daily of potassium. Some pain for 3 days left leg more than then right. He has been elevating the legs.             Review of Systems        Objective    BP (!) 145/72   Pulse 87   Temp 97.8  F (36.6  C) (Tympanic)   Resp 18   Wt 122.5 kg (270 lb)    SpO2 96%   BMI 36.62 kg/m    Physical Exam  Skin:            Comments: Left LE edema left 1.5x greater than right. Warm and erythema to left LE with tenderness moderate.

## 2024-04-08 NOTE — TELEPHONE ENCOUNTER
Yes is okay to keep appointment next week.  *Provider called patient.  Patient was seen in the urgent care today.  Metolazone dose was increased to double the dose; 5 mg twice a day for 5 days advised him to increase his potassium intake since the diuretic is increasing, his potassium is on the low side, he can take up to 40 mEq daily for the next 5 days while he is on the higher dose of metolazone.  His CBC is reassuring and kidney function is normal, reassurance given, all questions answered.  Patient was appreciative of follow-up phone call.  Dr Heath

## 2024-04-09 DIAGNOSIS — F41.9 ANXIETY: ICD-10-CM

## 2024-04-09 RX ORDER — VENLAFAXINE 75 MG/1
TABLET ORAL
Qty: 180 TABLET | Refills: 0 | Status: SHIPPED | OUTPATIENT
Start: 2024-04-09 | End: 2024-07-08

## 2024-04-10 ENCOUNTER — OFFICE VISIT (OUTPATIENT)
Dept: FAMILY MEDICINE | Facility: CLINIC | Age: 84
End: 2024-04-10
Payer: MEDICARE

## 2024-04-10 VITALS
OXYGEN SATURATION: 94 % | RESPIRATION RATE: 20 BRPM | HEIGHT: 72 IN | HEART RATE: 74 BPM | SYSTOLIC BLOOD PRESSURE: 112 MMHG | BODY MASS INDEX: 36.72 KG/M2 | DIASTOLIC BLOOD PRESSURE: 70 MMHG | WEIGHT: 271.1 LBS | TEMPERATURE: 98.7 F

## 2024-04-10 DIAGNOSIS — I10 PRIMARY HYPERTENSION: Primary | ICD-10-CM

## 2024-04-10 DIAGNOSIS — I48.0 PAROXYSMAL A-FIB (H): ICD-10-CM

## 2024-04-10 DIAGNOSIS — I89.0 LYMPHEDEMA: ICD-10-CM

## 2024-04-10 DIAGNOSIS — I50.32 CHRONIC HEART FAILURE WITH PRESERVED EJECTION FRACTION (H): ICD-10-CM

## 2024-04-10 LAB
BASOPHILS # BLD AUTO: 0 10E3/UL (ref 0–0.2)
BASOPHILS NFR BLD AUTO: 0 %
EOSINOPHIL # BLD AUTO: 0.2 10E3/UL (ref 0–0.7)
EOSINOPHIL NFR BLD AUTO: 2 %
ERYTHROCYTE [DISTWIDTH] IN BLOOD BY AUTOMATED COUNT: 14.6 % (ref 10–15)
HCT VFR BLD AUTO: 39.2 % (ref 40–53)
HGB BLD-MCNC: 12.7 G/DL (ref 13.3–17.7)
IMM GRANULOCYTES # BLD: 0.1 10E3/UL
IMM GRANULOCYTES NFR BLD: 1 %
LYMPHOCYTES # BLD AUTO: 2.8 10E3/UL (ref 0.8–5.3)
LYMPHOCYTES NFR BLD AUTO: 26 %
MCH RBC QN AUTO: 32.2 PG (ref 26.5–33)
MCHC RBC AUTO-ENTMCNC: 32.4 G/DL (ref 31.5–36.5)
MCV RBC AUTO: 100 FL (ref 78–100)
MONOCYTES # BLD AUTO: 1.1 10E3/UL (ref 0–1.3)
MONOCYTES NFR BLD AUTO: 11 %
NEUTROPHILS # BLD AUTO: 6.5 10E3/UL (ref 1.6–8.3)
NEUTROPHILS NFR BLD AUTO: 60 %
PLATELET # BLD AUTO: 203 10E3/UL (ref 150–450)
RBC # BLD AUTO: 3.94 10E6/UL (ref 4.4–5.9)
WBC # BLD AUTO: 10.7 10E3/UL (ref 4–11)

## 2024-04-10 PROCEDURE — 83880 ASSAY OF NATRIURETIC PEPTIDE: CPT | Performed by: INTERNAL MEDICINE

## 2024-04-10 PROCEDURE — 82728 ASSAY OF FERRITIN: CPT | Performed by: INTERNAL MEDICINE

## 2024-04-10 PROCEDURE — 80053 COMPREHEN METABOLIC PANEL: CPT | Performed by: INTERNAL MEDICINE

## 2024-04-10 PROCEDURE — 99214 OFFICE O/P EST MOD 30 MIN: CPT | Mod: 25 | Performed by: INTERNAL MEDICINE

## 2024-04-10 PROCEDURE — 86140 C-REACTIVE PROTEIN: CPT | Performed by: INTERNAL MEDICINE

## 2024-04-10 PROCEDURE — 85025 COMPLETE CBC W/AUTO DIFF WBC: CPT | Performed by: INTERNAL MEDICINE

## 2024-04-10 PROCEDURE — 93000 ELECTROCARDIOGRAM COMPLETE: CPT | Performed by: INTERNAL MEDICINE

## 2024-04-10 PROCEDURE — 36415 COLL VENOUS BLD VENIPUNCTURE: CPT | Performed by: INTERNAL MEDICINE

## 2024-04-10 ASSESSMENT — PAIN SCALES - GENERAL: PAINLEVEL: NO PAIN (0)

## 2024-04-10 NOTE — PROGRESS NOTES
Assessment & Plan   Problem List Items Addressed This Visit       HTN (hypertension) - Primary    Paroxysmal A-fib (H)    Relevant Orders    EKG 12-lead complete w/read - Clinics (Completed)    CBC with platelets and differential (Completed)    Lymphedema    Relevant Orders    CBC with platelets and differential (Completed)    CRP inflammation    (HFpEF) heart failure with preserved ejection fraction (H)    Relevant Orders    Comprehensive metabolic panel    N terminal pro BNP outpatient      Lymphedema acute on chronic; acute worsening; the dose of metolazone was increased to 5 mg twice daily, will check his kidney function; potassium and proBNP will repeat blood counts and inflammatory marker.  Chronic diastolic heart failure will check proBNP.  Last echo did not estimate the pulmonary pressure, could not visualize right ventricle function or pressure.  He is on chronic anticoagulation; doubt there is a blood clot going.  I would advise patient follow-up with vascular medicine as well continue with lymphedema PT; left leg elevation compression stockings, limit salt intake in ambulate as possible.  I doubt there is cellulitis in the lower extremity but will check inflammatory marker, also discussed starting patient on GLP-1 agonist he has obesity and diabetes; he will definitely benefit from these injection.  Will CC MTM follows patient     CONSULTATION/REFERRAL to MTM  Work on weight loss  Regular exercise  See Patient Instructions      Subjective   Jaun is a 83 year old, presenting for the following health issues:  Leg Swelling (LEFT LEG) and Cough (MUCUS STILL Constant)        4/10/2024     2:54 PM   Additional Questions   Roomed by Willie   Accompanied by Not applicable, by themselves     History of Present Illness       Reason for visit:  Swollen legs    He eats 0-1 servings of fruits and vegetables daily.He consumes 2 sweetened beverage(s) daily.He exercises with enough effort to increase his heart rate  10 to 19 minutes per day.  He exercises with enough effort to increase his heart rate 3 or less days per week.   He is taking medications regularly.     Jaun is presenting for follow-up.  He bumped up his diuretics metolazone taking 5 mg twice a day in addition to increasing the potassium to 30 mEq.  This has helped somewhat with the swelling.  He had labs in urgent care.  Potassium was slightly low.  Kidney function test is stable.  Anemia is corrected.  He still has some clear phlegm.  He was given doxycycline for 7 days by urgent care.  Still has ongoing cough.  Swelling happened pretty fast according to patient.  Has been elevating his legs.  The swelling going down as per patient.  Had echocardiogram in January 2024.  Showed ejection fraction 55 to 60% ascending aorta measures 4 cm right ventricular systolic function is normal.  Blood pressure has been stable.  Systolic 135/76.  No difficulty breathing  No diarrhea.  No dizziness or lightheaded.  Voiding is good.  Has more phlegm since started using the doxycycline.  Has frequent nocturia during the night so he is not getting good night sleep and is feeling tired.  Using the BiPAP regularly.  EKG last done showed possible inferior ischemia; cannot rule out anterior infarct.  Seen cardiology back in February had an echocardiogram they could not estimate the pulmonary pressures.      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  His ascending aorta is considered borderline dilated.  As per cardiology note there is no symptoms of angina pectoris, his coronary artery disease prior stenting of left anterior descending artery, HDL 57, liver function test is normal kidney function test is normal.  Blood pressure at home in the 130s systolic to 70 diastolic range.   CARDIOLOGY is not concerned at all, advised to be working on losing weight    Review of Systems  Constitutional, HEENT, cardiovascular,  pulmonary, gi and gu systems are negative, except as otherwise noted.      Objective    /70   Pulse 74   Temp 98.7  F (37.1  C) (Temporal)   Resp 20   Ht 1.829 m (6')   Wt 123 kg (271 lb 1.6 oz)   SpO2 94%   BMI 36.77 kg/m    Body mass index is 36.77 kg/m .  Physical Exam   GENERAL: alert and no distress, very pleasant  EYES: Eyes grossly normal to inspection, PERRL and conjunctivae and sclerae normal    NECK: no adenopathy, no asymmetry, masses, or scars  RESP: lungs clear to auscultation - no rales, rhonchi or wheezes  CV: regular rate and rhythm, normal S1 S2, no S3 or S4, no murmur, click or rub,   Has significant lymphedema bilateral lower extremity, 3+ pitting edema left lower extremity and +2 pitting edema right lower extremity.  No evidence of erythroderma there is some tightness of the skin in the distal shin area bilateral circumferential.  No open wounds or oozing lesions.  ABDOMEN: soft, obese, nontender, no hepatosplenomegaly, no masses and bowel sounds normal  MS: no gross musculoskeletal defects noted,  SKIN: no suspicious lesions or rashes  NEURO: Normal strength and tone, mentation intact and speech normal  PSYCH: mentation appears normal, affect normal/bright    Office Visit on 04/06/2024   Component Date Value Ref Range Status    Sodium 04/06/2024 140  135 - 145 mmol/L Final    Reference intervals for this test were updated on 09/26/2023 to more accurately reflect our healthy population. There may be differences in the flagging of prior results with similar values performed with this method. Interpretation of those prior results can be made in the context of the updated reference intervals.     Potassium 04/06/2024 3.3 (L)  3.4 - 5.3 mmol/L Final    Chloride 04/06/2024 96 (L)  98 - 107 mmol/L Final    Carbon Dioxide (CO2) 04/06/2024 30 (H)  22 - 29 mmol/L Final    Anion Gap 04/06/2024 14  7 - 15 mmol/L Final    Urea Nitrogen 04/06/2024 21.5  8.0 - 23.0 mg/dL Final    Creatinine  04/06/2024 1.05  0.67 - 1.17 mg/dL Final    GFR Estimate 04/06/2024 70  >60 mL/min/1.73m2 Final    Calcium 04/06/2024 10.2  8.8 - 10.2 mg/dL Final    Glucose 04/06/2024 110 (H)  70 - 99 mg/dL Final    WBC Count 04/06/2024 10.6  4.0 - 11.0 10e3/uL Final    RBC Count 04/06/2024 4.17 (L)  4.40 - 5.90 10e6/uL Final    Hemoglobin 04/06/2024 13.6  13.3 - 17.7 g/dL Final    Hematocrit 04/06/2024 41.2  40.0 - 53.0 % Final    MCV 04/06/2024 99  78 - 100 fL Final    MCH 04/06/2024 32.6  26.5 - 33.0 pg Final    MCHC 04/06/2024 33.0  31.5 - 36.5 g/dL Final    RDW 04/06/2024 14.5  10.0 - 15.0 % Final    Platelet Count 04/06/2024 201  150 - 450 10e3/uL Final    % Neutrophils 04/06/2024 69  % Final    % Lymphocytes 04/06/2024 20  % Final    % Monocytes 04/06/2024 9  % Final    % Eosinophils 04/06/2024 1  % Final    % Basophils 04/06/2024 0  % Final    % Immature Granulocytes 04/06/2024 0  % Final    Absolute Neutrophils 04/06/2024 7.3  1.6 - 8.3 10e3/uL Final    Absolute Lymphocytes 04/06/2024 2.1  0.8 - 5.3 10e3/uL Final    Absolute Monocytes 04/06/2024 1.0  0.0 - 1.3 10e3/uL Final    Absolute Eosinophils 04/06/2024 0.1  0.0 - 0.7 10e3/uL Final    Absolute Basophils 04/06/2024 0.0  0.0 - 0.2 10e3/uL Final    Absolute Immature Granulocytes 04/06/2024 0.0  <=0.4 10e3/uL Final           Signed Electronically by: Anne Heath MD

## 2024-04-11 DIAGNOSIS — D64.9 MILD ANEMIA: Primary | ICD-10-CM

## 2024-04-11 DIAGNOSIS — R79.82 ELEVATED C-REACTIVE PROTEIN (CRP): ICD-10-CM

## 2024-04-11 DIAGNOSIS — I50.32 CHRONIC DIASTOLIC HEART FAILURE (H): ICD-10-CM

## 2024-04-11 LAB
ALBUMIN SERPL BCG-MCNC: 4.4 G/DL (ref 3.5–5.2)
ALP SERPL-CCNC: 110 U/L (ref 40–150)
ALT SERPL W P-5'-P-CCNC: 24 U/L (ref 0–70)
ANION GAP SERPL CALCULATED.3IONS-SCNC: 13 MMOL/L (ref 7–15)
AST SERPL W P-5'-P-CCNC: 25 U/L (ref 0–45)
BILIRUB SERPL-MCNC: 0.4 MG/DL
BUN SERPL-MCNC: 34 MG/DL (ref 8–23)
CALCIUM SERPL-MCNC: 10.5 MG/DL (ref 8.8–10.2)
CHLORIDE SERPL-SCNC: 92 MMOL/L (ref 98–107)
CREAT SERPL-MCNC: 1.2 MG/DL (ref 0.67–1.17)
CRP SERPL-MCNC: 7.73 MG/L
DEPRECATED HCO3 PLAS-SCNC: 31 MMOL/L (ref 22–29)
EGFRCR SERPLBLD CKD-EPI 2021: 60 ML/MIN/1.73M2
FERRITIN SERPL-MCNC: 58 NG/ML (ref 31–409)
GLUCOSE SERPL-MCNC: 108 MG/DL (ref 70–99)
NT-PROBNP SERPL-MCNC: 39 PG/ML (ref 0–1800)
POTASSIUM SERPL-SCNC: 3.8 MMOL/L (ref 3.4–5.3)
PROT SERPL-MCNC: 7.3 G/DL (ref 6.4–8.3)
SODIUM SERPL-SCNC: 136 MMOL/L (ref 135–145)

## 2024-04-14 DIAGNOSIS — E61.1 LOW IRON: ICD-10-CM

## 2024-04-22 ENCOUNTER — APPOINTMENT (OUTPATIENT)
Dept: GENERAL RADIOLOGY | Facility: CLINIC | Age: 84
End: 2024-04-22
Attending: EMERGENCY MEDICINE
Payer: MEDICARE

## 2024-04-22 ENCOUNTER — HOSPITAL ENCOUNTER (EMERGENCY)
Facility: CLINIC | Age: 84
Discharge: HOME | End: 2024-04-23
Attending: EMERGENCY MEDICINE | Admitting: EMERGENCY MEDICINE
Payer: MEDICARE

## 2024-04-22 ENCOUNTER — MYC REFILL (OUTPATIENT)
Dept: FAMILY MEDICINE | Facility: CLINIC | Age: 84
End: 2024-04-22

## 2024-04-22 ENCOUNTER — OFFICE VISIT (OUTPATIENT)
Dept: URGENT CARE | Facility: URGENT CARE | Age: 84
End: 2024-04-22
Payer: MEDICARE

## 2024-04-22 VITALS
SYSTOLIC BLOOD PRESSURE: 137 MMHG | BODY MASS INDEX: 36.08 KG/M2 | HEART RATE: 77 BPM | WEIGHT: 266 LBS | OXYGEN SATURATION: 91 % | DIASTOLIC BLOOD PRESSURE: 75 MMHG | TEMPERATURE: 98.5 F

## 2024-04-22 DIAGNOSIS — I10 ESSENTIAL HYPERTENSION: ICD-10-CM

## 2024-04-22 DIAGNOSIS — R42 DIZZINESS: ICD-10-CM

## 2024-04-22 DIAGNOSIS — R42 LIGHTHEADEDNESS: ICD-10-CM

## 2024-04-22 DIAGNOSIS — I50.32 CHRONIC DIASTOLIC HEART FAILURE (H): ICD-10-CM

## 2024-04-22 DIAGNOSIS — R26.81 UNSTEADY GAIT: Primary | ICD-10-CM

## 2024-04-22 DIAGNOSIS — I25.119 CORONARY ARTERY DISEASE INVOLVING NATIVE CORONARY ARTERY WITH ANGINA PECTORIS, UNSPECIFIED WHETHER NATIVE OR TRANSPLANTED HEART (H): ICD-10-CM

## 2024-04-22 DIAGNOSIS — I48.0 PAROXYSMAL A-FIB (H): ICD-10-CM

## 2024-04-22 DIAGNOSIS — Z20.822 SUSPECTED COVID-19 VIRUS INFECTION: ICD-10-CM

## 2024-04-22 DIAGNOSIS — J02.9 PHARYNGITIS, UNSPECIFIED ETIOLOGY: ICD-10-CM

## 2024-04-22 DIAGNOSIS — R68.89 FLU-LIKE SYMPTOMS: ICD-10-CM

## 2024-04-22 DIAGNOSIS — R00.2 PALPITATIONS: ICD-10-CM

## 2024-04-22 DIAGNOSIS — J42 CHRONIC BRONCHITIS, UNSPECIFIED CHRONIC BRONCHITIS TYPE (H): ICD-10-CM

## 2024-04-22 LAB
ALBUMIN SERPL BCG-MCNC: 4.2 G/DL (ref 3.5–5.2)
ALP SERPL-CCNC: 115 U/L (ref 40–150)
ALT SERPL W P-5'-P-CCNC: 20 U/L (ref 0–70)
ANION GAP SERPL CALCULATED.3IONS-SCNC: 14 MMOL/L (ref 7–15)
AST SERPL W P-5'-P-CCNC: 29 U/L (ref 0–45)
BASOPHILS # BLD AUTO: 0.1 10E3/UL (ref 0–0.2)
BASOPHILS NFR BLD AUTO: 1 %
BILIRUB SERPL-MCNC: 0.5 MG/DL
BUN SERPL-MCNC: 24.5 MG/DL (ref 8–23)
CALCIUM SERPL-MCNC: 9.9 MG/DL (ref 8.8–10.2)
CHLORIDE SERPL-SCNC: 94 MMOL/L (ref 98–107)
CREAT SERPL-MCNC: 1.09 MG/DL (ref 0.67–1.17)
DEPRECATED HCO3 PLAS-SCNC: 29 MMOL/L (ref 22–29)
DEPRECATED S PYO AG THROAT QL EIA: NEGATIVE
EGFRCR SERPLBLD CKD-EPI 2021: 67 ML/MIN/1.73M2
EOSINOPHIL # BLD AUTO: 0.2 10E3/UL (ref 0–0.7)
EOSINOPHIL NFR BLD AUTO: 2 %
ERYTHROCYTE [DISTWIDTH] IN BLOOD BY AUTOMATED COUNT: 14.2 % (ref 10–15)
FLUAV AG SPEC QL IA: NEGATIVE
FLUBV AG SPEC QL IA: NEGATIVE
GLUCOSE SERPL-MCNC: 94 MG/DL (ref 70–99)
HCT VFR BLD AUTO: 38.7 % (ref 40–53)
HGB BLD-MCNC: 12.9 G/DL (ref 13.3–17.7)
IMM GRANULOCYTES # BLD: 0.1 10E3/UL
IMM GRANULOCYTES NFR BLD: 0 %
LYMPHOCYTES # BLD AUTO: 2.7 10E3/UL (ref 0.8–5.3)
LYMPHOCYTES NFR BLD AUTO: 24 %
MAGNESIUM SERPL-MCNC: 2.1 MG/DL (ref 1.7–2.3)
MCH RBC QN AUTO: 32.5 PG (ref 26.5–33)
MCHC RBC AUTO-ENTMCNC: 33.3 G/DL (ref 31.5–36.5)
MCV RBC AUTO: 98 FL (ref 78–100)
MONOCYTES # BLD AUTO: 1 10E3/UL (ref 0–1.3)
MONOCYTES NFR BLD AUTO: 9 %
NEUTROPHILS # BLD AUTO: 7.4 10E3/UL (ref 1.6–8.3)
NEUTROPHILS NFR BLD AUTO: 64 %
NRBC # BLD AUTO: 0 10E3/UL
NRBC BLD AUTO-RTO: 0 /100
NT-PROBNP SERPL-MCNC: 37 PG/ML (ref 0–1800)
PLATELET # BLD AUTO: 210 10E3/UL (ref 150–450)
POTASSIUM SERPL-SCNC: 3 MMOL/L (ref 3.4–5.3)
PROT SERPL-MCNC: 7.3 G/DL (ref 6.4–8.3)
RBC # BLD AUTO: 3.97 10E6/UL (ref 4.4–5.9)
SODIUM SERPL-SCNC: 137 MMOL/L (ref 135–145)
TROPONIN T SERPL HS-MCNC: 38 NG/L
TROPONIN T SERPL HS-MCNC: 42 NG/L
WBC # BLD AUTO: 11.4 10E3/UL (ref 4–11)

## 2024-04-22 PROCEDURE — 83735 ASSAY OF MAGNESIUM: CPT | Performed by: STUDENT IN AN ORGANIZED HEALTH CARE EDUCATION/TRAINING PROGRAM

## 2024-04-22 PROCEDURE — 83880 ASSAY OF NATRIURETIC PEPTIDE: CPT | Performed by: EMERGENCY MEDICINE

## 2024-04-22 PROCEDURE — 71046 X-RAY EXAM CHEST 2 VIEWS: CPT

## 2024-04-22 PROCEDURE — 84484 ASSAY OF TROPONIN QUANT: CPT | Performed by: SOCIAL WORKER

## 2024-04-22 PROCEDURE — 36415 COLL VENOUS BLD VENIPUNCTURE: CPT | Performed by: STUDENT IN AN ORGANIZED HEALTH CARE EDUCATION/TRAINING PROGRAM

## 2024-04-22 PROCEDURE — 99214 OFFICE O/P EST MOD 30 MIN: CPT | Performed by: FAMILY MEDICINE

## 2024-04-22 PROCEDURE — 84484 ASSAY OF TROPONIN QUANT: CPT | Performed by: EMERGENCY MEDICINE

## 2024-04-22 PROCEDURE — 87651 STREP A DNA AMP PROBE: CPT | Performed by: FAMILY MEDICINE

## 2024-04-22 PROCEDURE — 36415 COLL VENOUS BLD VENIPUNCTURE: CPT | Performed by: SOCIAL WORKER

## 2024-04-22 PROCEDURE — 84484 ASSAY OF TROPONIN QUANT: CPT | Performed by: STUDENT IN AN ORGANIZED HEALTH CARE EDUCATION/TRAINING PROGRAM

## 2024-04-22 PROCEDURE — 99285 EMERGENCY DEPT VISIT HI MDM: CPT | Mod: 25

## 2024-04-22 PROCEDURE — 87804 INFLUENZA ASSAY W/OPTIC: CPT | Performed by: FAMILY MEDICINE

## 2024-04-22 PROCEDURE — 80053 COMPREHEN METABOLIC PANEL: CPT | Performed by: SOCIAL WORKER

## 2024-04-22 PROCEDURE — 80053 COMPREHEN METABOLIC PANEL: CPT | Performed by: EMERGENCY MEDICINE

## 2024-04-22 PROCEDURE — 93005 ELECTROCARDIOGRAM TRACING: CPT

## 2024-04-22 PROCEDURE — 250N000013 HC RX MED GY IP 250 OP 250 PS 637: Performed by: EMERGENCY MEDICINE

## 2024-04-22 PROCEDURE — 85025 COMPLETE CBC W/AUTO DIFF WBC: CPT | Performed by: EMERGENCY MEDICINE

## 2024-04-22 PROCEDURE — 96360 HYDRATION IV INFUSION INIT: CPT

## 2024-04-22 PROCEDURE — 87635 SARS-COV-2 COVID-19 AMP PRB: CPT | Performed by: FAMILY MEDICINE

## 2024-04-22 PROCEDURE — 85025 COMPLETE CBC W/AUTO DIFF WBC: CPT | Performed by: SOCIAL WORKER

## 2024-04-22 PROCEDURE — 93005 ELECTROCARDIOGRAM TRACING: CPT | Performed by: FAMILY MEDICINE

## 2024-04-22 RX ORDER — POTASSIUM CHLORIDE 1500 MG/1
40 TABLET, EXTENDED RELEASE ORAL ONCE
Status: COMPLETED | OUTPATIENT
Start: 2024-04-22 | End: 2024-04-22

## 2024-04-22 RX ORDER — DILTIAZEM HYDROCHLORIDE 300 MG/1
300 CAPSULE, COATED, EXTENDED RELEASE ORAL DAILY
Qty: 90 CAPSULE | Refills: 3 | Status: SHIPPED | OUTPATIENT
Start: 2024-04-22

## 2024-04-22 RX ADMIN — POTASSIUM CHLORIDE 40 MEQ: 1500 TABLET, EXTENDED RELEASE ORAL at 23:36

## 2024-04-22 ASSESSMENT — ACTIVITIES OF DAILY LIVING (ADL)
ADLS_ACUITY_SCORE: 36

## 2024-04-22 ASSESSMENT — COLUMBIA-SUICIDE SEVERITY RATING SCALE - C-SSRS
6. HAVE YOU EVER DONE ANYTHING, STARTED TO DO ANYTHING, OR PREPARED TO DO ANYTHING TO END YOUR LIFE?: NO
2. HAVE YOU ACTUALLY HAD ANY THOUGHTS OF KILLING YOURSELF IN THE PAST MONTH?: NO
1. IN THE PAST MONTH, HAVE YOU WISHED YOU WERE DEAD OR WISHED YOU COULD GO TO SLEEP AND NOT WAKE UP?: NO

## 2024-04-22 NOTE — PROGRESS NOTES
"    ASSESSMENT/PLAN:      ICD-10-CM    1. Unsteady gait  R26.81     acute onset today with onset of feeling lightheaded      2. Lightheadedness  R42       3. Palpitations  R00.2 EKG 12-lead, tracing only    afib, on xarelto      4. Chronic diastolic heart failure (H)  I50.32     recent increase in his diurectics 4/10/2024, decrease in lower ext edema,  ? dehydration      5. Paroxysmal A-fib (H)  I48.0     Intermittent palpitations today, hx afib, EKG sinus rhythm, HR 80, no acute changes or changes from previous EKG      6. Coronary artery disease involving native coronary artery with angina pectoris, unspecified whether native or transplanted heart (H24)  I25.119       7. Chronic bronchitis, unspecified chronic bronchitis type (H)  J42     COPD      8. Flu-like symptoms  R68.89 Influenza A & B Antigen - Clinic Collect      9. Pharyngitis, unspecified etiology  J02.9 Streptococcus A Rapid Screen w/Reflex to PCR - Clinic Collect     Group A Streptococcus PCR Throat Swab      10. Suspected COVID-19 virus infection  Z20.822 Symptomatic COVID-19 Virus (Coronavirus) by PCR Nose          Patient Instructions   To [facility] 83-year-old male with history of COPD, A-fib, CAD, chronic diastolic heart failure, hx of PE and DVT(on Xarelto,) with onset today of intermittent palpitations,lightheaded dizziness with unsteady gait, feeling \"woozy\"and nauseous   Of note, recent increase in diuretics 4/10/2024 due to a exacerbation of his chronic diastolic heart failure-concerning current symptoms may be due possible dehydration from recent increase in diuretics, possible cardiac event- EKG no acute changes, heart rate 80 and no change from previous, less likely PE, but decrease in sats since arrival from 98% to 91%      Patient initially presented to urgent care initially complaining of onset this a.m. of sore throat, sinus pressure no chills, mild cough started he may have strep flu or COVID after visiting his sister at a nursing " facility yesterday strep flu negative COVID is pending-patient then admitted to having palpitations feeling woozy dizziness, difficulty ambulating into the clinic gait was unsteady not at his baseline, and has been starting to feel more nauseated in the last few hours, no emesis, diarrhea      Contacted [facility] ER gave report to staff.  He will be going over by private car, a friend will be driving him to ER,        Reviewed medication instructions and side effects. Follow up if experiences side effects.     I reviewed supportive care, otc meds to use if needed, expected course, and signs of concern.  Follow up as needed or if he does not improve within  1-2 days or if worsens in any way.  Reviewed red flag symptoms and is to go to the ER if experiences any of these.     The use of Dragon/BlazeMeteration services may have been used to construct the content in this note; any grammatical or spelling errors are non-intentional. Please contact the author of this note directly if you are in need of any clarification.      On the day of the encounter, time spend on chart review, patient visit, review of testing, documentation was 30 minutes              Patient presents with:  Urgent Care  Pharyngitis: Sore throat, cough started this morning. Pt visited sister in care facility.        Subjective     Erich Craven is a 83 year old male who presents to clinic today for the following health issues:    HPI     Acute Illness  Acute illness concerns: Onset this a.m. scratchy sore throat, sinus congestion ,nausea, cough    Symptoms:  Fever: No  Chills/Sweats: No  Headache (location?): No  Sinus Pressure: YES-nasal rinses which has helped  Conjunctivitis:  No  Ear Pain: no  Rhinorrhea: YES  Congestion: YES  Sore Throat: YES  Cough: YES-productive of clear/white sputum  Wheeze: No-patient with COPD  Decreased Appetite: YES-drinking fluids  Nausea: YES  Vomiting: No  Diarrhea: No  Fatigue/Achiness: no  Patient states he is  having intermittent palpitations -feels like brief flutters-today-since the last 3 to 4 hours, history of this in the past, no chest pain or chest pressure  He states he is feeling a bit woozy and nauseous last 2 hours and more unsteady on his feet  Sick/Strep Exposure: YES-visited his sister yesterday at a health care facility    Patient with history of CAD, A-fib-per patient A-fib was ablated and has a pacemaker, history of PE on Xarelto, CAD  Recent exacerbation of his chronic diastolic heart failure seen on 4/6/2024 with follow-up with his primary care provider on 4/10/2020 increased swelling in his legs at the time, his metolazone was increased to 5 mg twice daily, Lasix 40 mg twice daily    Patient initially presented to urgent care initially complaining of onset this a.m. 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 Started in 2012    Hypothyroidism     Lymphedema     Mumps     Obesity, Class  III, BMI 40-49.9 (morbid obesity) (H)     RIN (obstructive sleep apnea)     has refused CPAP    Pacemaker     Paroxysmal A-fib (H)     PE (pulmonary thromboembolism) (H)     Status post coronary angiogram 2019     Social History     Tobacco Use    Smoking status: Former     Current packs/day: 0.00     Average packs/day: 0.5 packs/day for 30.0 years (15.0 ttl pk-yrs)     Types: Cigarettes     Start date: 6/15/1959     Quit date: 1989     Years since quittin.8    Smokeless tobacco: Never    Tobacco comments:     smoked from  to    Substance Use Topics    Alcohol use: No     Comment: quit in ; recovering       Current Outpatient Medications   Medication Sig Dispense Refill    acetaminophen (TYLENOL) 500 MG tablet Take 1,000 mg by mouth 3 times daily      Acetylcarnitine HCl (ACETYL L-CARNITINE PO) Take 1 capsule by mouth daily      albuterol (PROAIR HFA/PROVENTIL HFA/VENTOLIN HFA) 108 (90 Base) MCG/ACT inhaler Inhale 2 puffs into the lungs 4 times daily as needed for shortness of breath or wheezing 18 g 3    aspirin (ASA) 81 MG chewable tablet Take 1 tablet by mouth daily      atorvastatin (LIPITOR) 80 MG tablet Take 1 tablet (80 mg) by mouth every evening 90 tablet 4    diltiazem ER COATED BEADS (CARDIZEM CD/CARTIA XT) 300 MG 24 hr capsule Take 1 capsule (300 mg) by mouth daily 90 capsule 3    ferrous sulfate (SLOW FE) 142 (45 Fe) MG CR tablet TAKE ONE TABLET BY MOUTH EVERY OTHER DAY 45 tablet 1    Fluticasone-Umeclidin-Vilanterol (TRELEGY ELLIPTA) 200-62.5-25 MCG/INH oral inhaler Inhale 1 puff into the lungs daily      furosemide (LASIX) 40 MG tablet Take 1 tablet (40 mg) by mouth 2 times daily 180 tablet 1    isosorbide mononitrate (IMDUR) 30 MG 24 hr tablet Take 1 tablet (30 mg) by mouth daily 90 tablet 3    levothyroxine (SYNTHROID/LEVOTHROID) 175 MCG tablet Take 1 tablet (175 mcg) by mouth daily 90 tablet 3    lisinopril (ZESTRIL) 20 MG tablet Take 1 tablet (20 mg) by mouth daily 90  tablet 3    metolazone (ZAROXOLYN) 2.5 MG tablet Take 2 tablets (5 mg) by mouth 2 times daily      Multiple vitamin TABS Take 1 tablet by mouth daily      polyethylene glycol (MIRALAX) 17 g packet Take 17 g by mouth daily (Patient taking differently: Take 1 packet by mouth daily as needed) 7 packet 0    potassium chloride cammy ER (KLOR-CON M10) 10 MEQ CR tablet Take 1 tablet (10 mEq) by mouth 2 times daily 180 tablet 1    spironolactone (ALDACTONE) 25 MG tablet TAKE 1 TABLET(25 MG) BY MOUTH EVERY OTHER DAY 45 tablet 3    tamsulosin (FLOMAX) 0.4 MG capsule TAKE 1 CAPSULE(0.4 MG) BY MOUTH DAILY 90 capsule 2    TAURINE PO Take 1 capsule by mouth daily      venlafaxine (EFFEXOR) 75 MG tablet TAKE 1 TABLET(75 MG) BY MOUTH TWICE DAILY 180 tablet 0    XARELTO ANTICOAGULANT 20 MG TABS tablet TAKE 1 TABLET(20 MG) BY MOUTH DAILY WITH DINNER 30 tablet 11     Allergies   Allergen Reactions    No Known Allergies              ROS are negative, except as otherwise noted HPI      Objective    /75 (BP Location: Right arm, Patient Position: Sitting, Cuff Size: Adult Large)   Pulse 77   Temp 98.5  F (36.9  C) (Tympanic)   Wt 120.7 kg (266 lb)   SpO2 91%   BMI 36.08 kg/m    Body mass index is 36.08 kg/m .        Physical Exam   GENERAL: alert and  mild distress  EYES: Eyes grossly normal to inspection, and conjunctivae and sclerae normal  HENT: ear canals and TM's normal, nose congestion and mouth without ulcers or lesions  NECK: no adenopathy, no asymmetry,   RESP: lungs clear to auscultation - no rales, rhonchi or wheezes, long and expiratory phase  CV: regular rate and rhythm, no murmur, click or rub,   MS: no gross musculoskeletal defects noted, 1-2+ pretibial edema bilateral  significant decrease in swelling compared to previous exam per patient  NEURO: Normal strength and tone, mentation intact and speech normal, unsteady gait       Diagnostic Test Results:  Labs reviewed in Epic  Results for orders placed or performed  in visit on 04/22/24   Streptococcus A Rapid Screen w/Reflex to PCR - Clinic Collect     Status: Normal    Specimen: Throat; Swab   Result Value Ref Range    Group A Strep antigen Negative Negative   Influenza A & B Antigen - Clinic Collect     Status: Normal    Specimen: Nose; Swab   Result Value Ref Range    Influenza A antigen Negative Negative    Influenza B antigen Negative Negative    Narrative    Test results must be correlated with clinical data. If necessary, results should be confirmed by a molecular assay or viral culture.

## 2024-04-22 NOTE — PATIENT INSTRUCTIONS
"To [facility] ER 83-year-old male with history of COPD, A-fib, CAD, chronic diastolic heart failure, hx of PE and DVT(on Xarelto,) with onset today of intermittent palpitations,lightheaded dizziness with unsteady gait, feeling \"woozy\"and nauseous   Of note, recent increase in diuretics 4/10/2024 due to a exacerbation of his chronic diastolic heart failure-concerning current symptoms may be due possible dehydration from recent increase in diuretics, possible cardiac event- EKG no acute changes, heart rate 80 and no change from previous, less likely PE, but decrease in sats since arrival from 98% to 91%    Patient initially presented to urgent care initially complaining of onset this a.m. of sore throat, sinus pressure no chills, mild cough started he may have strep flu or COVID after visiting his sister at a nursing facility yesterday strep flu negative COVID is pending-patient then admitted to having palpitations feeling woozy dizziness, difficulty ambulating into the clinic gait was unsteady not at his baseline, and has been starting to feel more nauseated in the last few hours, no emesis, diarrhea    "

## 2024-04-23 ENCOUNTER — MYC MEDICAL ADVICE (OUTPATIENT)
Dept: FAMILY MEDICINE | Facility: CLINIC | Age: 84
End: 2024-04-23
Payer: MEDICARE

## 2024-04-23 VITALS
HEART RATE: 74 BPM | RESPIRATION RATE: 18 BRPM | TEMPERATURE: 98.1 F | SYSTOLIC BLOOD PRESSURE: 144 MMHG | OXYGEN SATURATION: 92 % | DIASTOLIC BLOOD PRESSURE: 73 MMHG

## 2024-04-23 LAB
GROUP A STREP BY PCR: NOT DETECTED
SARS-COV-2 RNA RESP QL NAA+PROBE: NEGATIVE

## 2024-04-23 PROCEDURE — 258N000003 HC RX IP 258 OP 636: Performed by: EMERGENCY MEDICINE

## 2024-04-23 RX ADMIN — SODIUM CHLORIDE 500 ML: 9 INJECTION, SOLUTION INTRAVENOUS at 00:05

## 2024-04-23 NOTE — ED TRIAGE NOTES
Pt sent here from  today.  Initially presented with URI symptoms, also reporting palpitations, dizziness, and difficulty ambulating.     Triage Assessment (Adult)       Row Name 04/22/24 1926          Triage Assessment    Airway WDL WDL        Respiratory WDL    Respiratory WDL WDL        Skin Circulation/Temperature WDL    Skin Circulation/Temperature WDL WDL        Cardiac WDL    Cardiac WDL WDL        Peripheral/Neurovascular WDL    Peripheral Neurovascular WDL WDL        Cognitive/Neuro/Behavioral WDL    Cognitive/Neuro/Behavioral WDL --  dizzy, unsteady

## 2024-04-23 NOTE — DISCHARGE INSTRUCTIONS
Based on your workup today, I suspect that you have mild dehydration, possibly from the strong diuretic medication you are taking.  I recommend decreasing your Lasix from 40 mg to 20 mg twice daily.  I recommend I touching base with primary care doctor in person or over the phone in the next 5 days to review your symptoms.  You may need to go back on your normal dosing of Lasix within the next 4 to 5 days.  Return to the ED right away if you develop increased dizziness, headache, numbness or weakness in your arms or legs, chest pain, passing out, or for any other concerns.    Discharge Instructions  Dizziness (Lightheaded)  Today you were seen for dizziness.  Dizziness can be caused by many things and it can be very difficult to determine the cause of dizziness.  At this time, your provider has found no signs that your dizziness is due to a serious or life-threatening condition. However, sometimes there is a serious problem that does not show up right away, and it is important for you to follow up with your regular provider as instructed.  Generally, every Emergency Department visit should have a follow-up clinic visit with either a primary or a specialty clinic/provider. Please follow-up as instructed by your emergency provider today.      Return to the Emergency Department if:    You pass out (fainting or falling out), especially during exercise.    You develop chest pain, chest pressure or difficulty breathing.  Your feel an irregular heartbeat.  You have excessive vaginal bleeding, or blood in your stool or vomit (throw up).  You have a high fever.  Your symptoms get worse or more frequent.    If when you begin to feel dizzy or lightheaded, it is important to sit down or lay down immediately to prevent injury from falling.  If you were given a prescription for medicine here today, be sure to read all of the information (including the package insert) that comes with your prescription.  This will include important  information about the medicine, its side effects, and any warnings that you need to know about.  The pharmacist who fills the prescription can provide more information and answer questions you may have about the medicine.  If you have questions or concerns that the pharmacist cannot address, please call or return to the Emergency Department.   Remember that you can always come back to the Emergency Department if you are not able to see your regular provider in the amount of time listed above, if you get any new symptoms, or if there is anything that worries you.

## 2024-04-23 NOTE — ED PROVIDER NOTES
"  History     Chief Complaint:  Dizziness       The history is provided by the patient.      Erich Craven is a 83 year old male, on Xarelto, with history of hypertension, hyperlipidemia, type 2 diabetes mellitus, COPD, CAD, CHF, DVT, and atrial fibrillation who presents with dizziness. He woke up this morning with a sore throat and a mild cough. Then he started having palpitations which are intermittent and last 1-2 seconds. He went to  thinking it might be a viral infection but then he started feeling dizzy at  so they advised him to come into the ED for further evaluation. The dizziness feels like light headedness and \"lights are brighter.\" He also has leg swelling and wears compression socks. He has a BiPAP with 1 L of oxygen at home. Denies room spinning, chest pain, shortness of breath, headaches, one-sided weakness, or history of heart attacks.    Independent Historian:   None - Patient Only    Review of External Notes:   I reviewed his  note from earlier today.        Medications:    Albuterol pro-air  ASA 81  Lipitor  Cardizem  Trelegy Ellipta  Lasix  Imdur  Levothyroxine  Lisinopril  Zaroxolyn  Aldactone  Flomax  Effexor  Xarelto    Past Medical History:    CHF  Alcohol dependence  Arthritis   BPH  CAD  COPD  Type 2 diabetes mellitus  DVT  Dyspnea  Hypertension  Hyperlipidemia  Hypothyroidism  Lymphedema  Mumps  Obesity  Paroxysmal atrial fibrillation  PE  RIN  Anxiety  Hypokalemia     Past Surgical History:    Right heart catheterization  Tonsillectomy   Implant pacemaker   Right knee replacement  Left shoulder arthroplasty       Physical Exam   Patient Vitals for the past 24 hrs:   BP Temp Pulse Resp SpO2   04/23/24 0000 (!) 144/73 -- 74 -- 92 %   04/22/24 2345 -- -- -- -- (!) 88 %   04/22/24 2323 124/70 -- 90 -- (!) 87 %   04/22/24 2300 (!) 154/82 -- -- -- (!) 88 %   04/22/24 1926 (!) 153/79 98.1  F (36.7  C) 87 18 94 %        Physical Exam  General: alert, walking about the room without " difficulty when I entered.  HENT: mucous membranes moist  CV: regular rate, regular rhythm  Resp: normal effort, clear throughout, no crackles or wheezing  GI: abdomen soft and nontender, no guarding  MSK: no bony tenderness  Skin: appropriately warm and dry  Extremities: no edema, calves non-tender  Neuro:    Speech is fluent, cognition is normal.     EOMI, symmetric grimace.     Str 5/5 in RUE, LUE, RLE, LLE.     Fine touch sensation intact in BUE/BLE.   No pronator drift.   Normal gait.   Nose intact bilateral upper extremities.  Psych: normal mood and affect      Emergency Department Course     Imaging:  Chest XR,  PA & LAT   Final Result   IMPRESSION: Normal heart size. Cardiac pacemaker remains in place. Lungs are clear of CHF, lobar consolidation or pleural effusions. Bilateral shoulder arthroplasties.        Laboratory:  Labs Ordered and Resulted from Time of ED Arrival to Time of ED Departure   COMPREHENSIVE METABOLIC PANEL - Abnormal       Result Value    Sodium 137      Potassium 3.0 (*)     Carbon Dioxide (CO2) 29      Anion Gap 14      Urea Nitrogen 24.5 (*)     Creatinine 1.09      GFR Estimate 67      Calcium 9.9      Chloride 94 (*)     Glucose 94      Alkaline Phosphatase 115      AST 29      ALT 20      Protein Total 7.3      Albumin 4.2      Bilirubin Total 0.5     TROPONIN T, HIGH SENSITIVITY - Abnormal    Troponin T, High Sensitivity 42 (*)    CBC WITH PLATELETS AND DIFFERENTIAL - Abnormal    WBC Count 11.4 (*)     RBC Count 3.97 (*)     Hemoglobin 12.9 (*)     Hematocrit 38.7 (*)     MCV 98      MCH 32.5      MCHC 33.3      RDW 14.2      Platelet Count 210      % Neutrophils 64      % Lymphocytes 24      % Monocytes 9      % Eosinophils 2      % Basophils 1      % Immature Granulocytes 0      NRBCs per 100 WBC 0      Absolute Neutrophils 7.4      Absolute Lymphocytes 2.7      Absolute Monocytes 1.0      Absolute Eosinophils 0.2      Absolute Basophils 0.1      Absolute Immature Granulocytes 0.1       Absolute NRBCs 0.0     TROPONIN T, HIGH SENSITIVITY - Abnormal    Troponin T, High Sensitivity 38 (*)    MAGNESIUM - Normal    Magnesium 2.1     NT PROBNP INPATIENT - Normal    N terminal Pro BNP Inpatient 37       Emergency Department Course & Assessments:    Interventions:  Medications   potassium chloride cammy ER (KLOR-CON M20) CR tablet 40 mEq (40 mEq Oral $Given 4/22/24 2336)   sodium chloride 0.9% BOLUS 500 mL (500 mLs Intravenous $New Bag 4/23/24 0005)      Assessments:  2242 I examined the patient and obtained history as noted above.   0048 I rechecked and updated the patient.     Independent Interpretation (X-rays, CTs, rhythm strip):  I reviewed the chest radiograph which demonstrates pacemaker leads in place, no effusion or pulmonary edema.    Consultations/Discussion of Management or Tests:   ED Course as of 04/23/24 0047 Tue Apr 23, 2024 0042 Orthostatics were positive.  I rechecked the patient, he is feeling better following IV fluids, ambulatory about the ED with a steady gait.     Social Determinants of Health affecting care:   None    Disposition:  The patient was discharged.       Impression & Plan       Medical Decision Making:  Erich Craven is a 83 year old male, on Xarelto, with history of hypertension, hyperlipidemia, type 2 diabetes mellitus, COPD, CAD, CHF, DVT, and atrial fibrillation who presents with dizziness.  On exam, the patient is slightly hypertensive, has borderline oxygen saturations, but otherwise is well-appearing.  He has a nonfocal neurological exam.  Labs are obtained even prior to my evaluation.  Patient has a mild anemia which is stable.  Renal function appears to be baseline.  He has mild hypokalemia, likely related to chronic diuretic use.  Troponin was noted to be slightly elevated, but without clinical symptoms to suggest acute coronary syndrome.  This is remained flat, EKG without ischemic changes either.  In the past, patient's troponins seem to hover in  the low 40s.  Otherwise, chest x-ray and BN peptide do not demonstrate findings consistent with volume overload.  I suspect the patient may be slightly over diuresed.  Orthostatic vitals were slightly positive.  On recheck, he is ambulatory with a steady gait following a small fluid bolus.  I recommend cutting back to furosemide 20 mg twice daily for the next 4 days, discussing symptoms further with primary care doctor in the interim.  Return to the ED for worsening dizziness, new symptoms such as severe headache, vertigo, chest pain, or for other concerns.      Diagnosis:    ICD-10-CM    1. Dizziness  R42            Scribe Disclosure:  I, Sebastian Domingo, am serving as a scribe at 10:44 PM on 4/22/2024 to document services personally performed by Lucia Grace MD based on my observations and the provider's statements to me.     4/22/2024   Lucia Grace MD Pepper, Tracy Lynn, MD  04/23/24 1541

## 2024-04-25 ENCOUNTER — PATIENT OUTREACH (OUTPATIENT)
Dept: CARE COORDINATION | Facility: CLINIC | Age: 84
End: 2024-04-25

## 2024-04-25 ENCOUNTER — OFFICE VISIT (OUTPATIENT)
Dept: FAMILY MEDICINE | Facility: CLINIC | Age: 84
End: 2024-04-25
Payer: MEDICARE

## 2024-04-25 VITALS
RESPIRATION RATE: 18 BRPM | WEIGHT: 270 LBS | HEART RATE: 70 BPM | SYSTOLIC BLOOD PRESSURE: 123 MMHG | DIASTOLIC BLOOD PRESSURE: 69 MMHG | BODY MASS INDEX: 36.57 KG/M2 | HEIGHT: 72 IN | TEMPERATURE: 96.9 F | OXYGEN SATURATION: 94 %

## 2024-04-25 DIAGNOSIS — E11.22 TYPE 2 DIABETES MELLITUS WITH STAGE 2 CHRONIC KIDNEY DISEASE, WITHOUT LONG-TERM CURRENT USE OF INSULIN (H): ICD-10-CM

## 2024-04-25 DIAGNOSIS — R79.82 ELEVATED C-REACTIVE PROTEIN (CRP): ICD-10-CM

## 2024-04-25 DIAGNOSIS — D72.829 LEUKOCYTOSIS, UNSPECIFIED TYPE: ICD-10-CM

## 2024-04-25 DIAGNOSIS — I50.32 CHRONIC DIASTOLIC HEART FAILURE (H): ICD-10-CM

## 2024-04-25 DIAGNOSIS — E87.6 HYPOKALEMIA: ICD-10-CM

## 2024-04-25 DIAGNOSIS — R42 DIZZINESS: Primary | ICD-10-CM

## 2024-04-25 DIAGNOSIS — N18.2 TYPE 2 DIABETES MELLITUS WITH STAGE 2 CHRONIC KIDNEY DISEASE, WITHOUT LONG-TERM CURRENT USE OF INSULIN (H): ICD-10-CM

## 2024-04-25 LAB
ANION GAP SERPL CALCULATED.3IONS-SCNC: 13 MMOL/L (ref 7–15)
ATRIAL RATE - MUSE: 84 BPM
BUN SERPL-MCNC: 30.4 MG/DL (ref 8–23)
CALCIUM SERPL-MCNC: 9.7 MG/DL (ref 8.8–10.2)
CHLORIDE SERPL-SCNC: 98 MMOL/L (ref 98–107)
CREAT SERPL-MCNC: 1.15 MG/DL (ref 0.67–1.17)
CRP SERPL-MCNC: 13.3 MG/L
DEPRECATED HCO3 PLAS-SCNC: 28 MMOL/L (ref 22–29)
DIASTOLIC BLOOD PRESSURE - MUSE: NORMAL MMHG
EGFRCR SERPLBLD CKD-EPI 2021: 63 ML/MIN/1.73M2
GLUCOSE SERPL-MCNC: 106 MG/DL (ref 70–99)
INTERPRETATION ECG - MUSE: NORMAL
P AXIS - MUSE: 27 DEGREES
POTASSIUM SERPL-SCNC: 3.8 MMOL/L (ref 3.4–5.3)
PR INTERVAL - MUSE: 214 MS
QRS DURATION - MUSE: 100 MS
QT - MUSE: 368 MS
QTC - MUSE: 434 MS
R AXIS - MUSE: 19 DEGREES
SODIUM SERPL-SCNC: 139 MMOL/L (ref 135–145)
SYSTOLIC BLOOD PRESSURE - MUSE: NORMAL MMHG
T AXIS - MUSE: 23 DEGREES
VENTRICULAR RATE- MUSE: 84 BPM
WBC # BLD AUTO: 9.6 10E3/UL (ref 4–11)

## 2024-04-25 PROCEDURE — 99214 OFFICE O/P EST MOD 30 MIN: CPT | Performed by: PHYSICIAN ASSISTANT

## 2024-04-25 PROCEDURE — 85048 AUTOMATED LEUKOCYTE COUNT: CPT | Performed by: PHYSICIAN ASSISTANT

## 2024-04-25 PROCEDURE — 86140 C-REACTIVE PROTEIN: CPT | Performed by: PHYSICIAN ASSISTANT

## 2024-04-25 PROCEDURE — 80048 BASIC METABOLIC PNL TOTAL CA: CPT | Performed by: PHYSICIAN ASSISTANT

## 2024-04-25 PROCEDURE — 36415 COLL VENOUS BLD VENIPUNCTURE: CPT | Performed by: PHYSICIAN ASSISTANT

## 2024-04-25 RX ORDER — FUROSEMIDE 40 MG
20 TABLET ORAL 2 TIMES DAILY
COMMUNITY
Start: 2024-04-25 | End: 2024-08-20

## 2024-04-25 ASSESSMENT — PAIN SCALES - GENERAL: PAINLEVEL: NO PAIN (0)

## 2024-04-25 NOTE — LETTER
Erich Craven  [address]  [place] [id number]    Dear Erich Craven,      I am a team member within the [facility] with [facility]. I recently tried to reach you to ensure you were doing well following a recent visit within our health system. I also wanted to take this chance to introduce Clinic Care Coordination.     Below is a description of Clinic Care Coordination and how this team can further assist you:       The Clinic Care Coordination team is made up of a Registered Nurse, , Financial Resource Worker, and a Community Health Worker who understand and can help navigate the health care system. The goal of clinic care coordination is to help you manage your health, improve access to care, and achieve optimal health outcomes. They work alongside your provider to assist you in determining your health and social needs, obtain health care and community resources, and provide you with necessary information and education. Clinic Care Coordination can work with you through any barriers and develop a care plan that helps coordinate and strengthen the relationship between you and your care team.    If you wish to connect with the Clinic Care Coordination Team, please let your [facility] Primary Care Provider or Clinic Care Team know and they can place a referral. The Clinic Care Coordination team will then reach out by phone to further support you.    We are focused on providing you with the highest-quality healthcare experience possible.    Sincerely,   Your care team with [facility]

## 2024-04-25 NOTE — PROGRESS NOTES
"  Vernell Zamorano is a 83 year old, presenting for the following health issues:  ER F/U    Hospitals in [place]       ED note dated 4/22/24 reads in part:  Medical Decision Making:  Erich Craven is a 83 year old male, on Xarelto, with history of hypertension, hyperlipidemia, type 2 diabetes mellitus, COPD, CAD, CHF, DVT, and atrial fibrillation who presents with dizziness.  On exam, the patient is slightly hypertensive, has borderline oxygen saturations, but otherwise is well-appearing.  He has a nonfocal neurological exam.  Labs are obtained even prior to my evaluation.  Patient has a mild anemia which is stable.  Renal function appears to be baseline.  He has mild hypokalemia, likely related to chronic diuretic use.  Troponin was noted to be slightly elevated, but without clinical symptoms to suggest acute coronary syndrome.  This is remained flat, EKG without ischemic changes either.  In the past, patient's troponins seem to hover in the low 40s.  Otherwise, chest x-ray and BN peptide do not demonstrate findings consistent with volume overload.  I suspect the patient may be slightly over diuresed.  Orthostatic vitals were slightly positive.  On recheck, he is ambulatory with a steady gait following a small fluid bolus.  I recommend cutting back to furosemide 20 mg twice daily for the next 4 days, discussing symptoms further with primary care doctor in the interim.  Return to the ED for worsening dizziness, new symptoms such as severe headache, vertigo, chest pain, or for other concerns.    ED/ Followup:    Facility:  [facility] Emergency Dept  Date of visit: 04/22/2024  Reason for visit: Dizziness  Current Status: \"much better\"    He has continued his lasix at 20mg BID  LE lymphedema improved with velcro wraps  He has post nasal drip which causes a bit of a cough  Breathing is stable.  Denies any ongoing sore throat  Denies fever or chills.  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 Started in 2012    Hypothyroidism     Lymphedema     Mumps     Obesity, Class III, BMI 40-49.9 (morbid obesity) (H)     RIN (obstructive sleep apnea)     has refused CPAP    Pacemaker     Paroxysmal A-fib (H)     PE (pulmonary thromboembolism) (H)     Status post coronary angiogram 11/05/2019     Past Surgical History:   Procedure Laterality Date    cardiac stenting      COLONOSCOPY  2010    CV RIGHT HEART CATH MEASUREMENTS RECORDED N/A 11/05/2019    Procedure: Right Heart Cath;  Surgeon: Roberto Hernandez MD;  [facility]    ENT SURGERY      tonsillectomy    EYE SURGERY  2012    IMPLANT PACEMAKER      INJECT NERVE BLOCK SUPRASCAPULAR Bilateral 08/26/2021    Procedure: BLOCK, NERVE, SUPRASCAPULAR, Bilateral, under ultrasound guidance;  Surgeon: Swetha Allen MD;  Location: [facility] OR    INJECT NERVE BLOCK SUPRASCAPULAR Bilateral 09/23/2021    Procedure: bilateral suprascapular nerve block;  Surgeon: Swetha Allen MD;  Location: [facility] OR    INJECT NERVE BLOCK SUPRASCAPULAR Left 09/15/2022    Procedure: BLOCK, NERVE, SUPRASCAPULAR- left;  Surgeon: Swetha Allen MD;  Location: [facility] OR    INJECT NERVE BLOCK SUPRASCAPULAR Left 10/20/2022    Procedure: BLOCK, NERVE, SUPRASCAPULAR- left;  Surgeon: Swetha Allen MD;  Location: [facility] OR    INJECT NERVE BLOCK  SUPRASCAPULAR Left 2023    Procedure: BLOCK, NERVE, SUPRASCAPULAR (left);  Surgeon: Swetha Allen MD;  Location: [facility] OR    NERVE BLOCK PERIPHERAL Bilateral 03/10/2022    Procedure: bilateral suprascapular nerve block;  Surgeon: Swetha Allen MD;  Location: [facility] OR    ORTHOPEDIC SURGERY Right 2012    knee replacement    REVERSE ARTHROPLASTY SHOULDER Left 2023    Procedure: ARTHROPLASTY, SHOULDER, TOTAL, REVERSE LEFT;  Surgeon: Katelyn Vasquez MD;  Location: [facility]    VASCULAR SURGERY       Social History     Tobacco Use    Smoking status: Former     Current packs/day: 0.00     Average packs/day: 0.5 packs/day for 30.0 years (15.0 ttl pk-yrs)     Types: Cigarettes     Start date: 6/15/1959     Quit date: 1989     Years since quittin.8    Smokeless tobacco: Never    Tobacco comments:     smoked from  to    Substance Use Topics    Alcohol use: No     Comment: quit in ; recovering     Current Outpatient Medications   Medication Sig Dispense Refill    acetaminophen (TYLENOL) 500 MG tablet Take 1,000 mg by mouth 3 times daily      Acetylcarnitine HCl (ACETYL L-CARNITINE PO) Take 1 capsule by mouth daily      albuterol (PROAIR HFA/PROVENTIL HFA/VENTOLIN HFA) 108 (90 Base) MCG/ACT inhaler Inhale 2 puffs into the lungs 4 times daily as needed for shortness of breath or wheezing 18 g 3    aspirin (ASA) 81 MG chewable tablet Take 1 tablet by mouth daily      atorvastatin (LIPITOR) 80 MG tablet Take 1 tablet (80 mg) by mouth every evening 90 tablet 4    diltiazem ER COATED BEADS (CARDIZEM CD/CARTIA XT) 300 MG 24 hr capsule Take 1 capsule (300 mg) by mouth daily 90 capsule 3    ferrous sulfate (SLOW FE) 142 (45 Fe) MG CR tablet TAKE ONE TABLET BY MOUTH EVERY OTHER DAY 45 tablet 1    Fluticasone-Umeclidin-Vilanterol (TRELEGY ELLIPTA) 200-62.5-25 MCG/INH oral inhaler Inhale 1 puff into the lungs daily      furosemide (LASIX) 40 MG tablet Take 1 tablet (40 mg) by mouth 2 times daily  180 tablet 1    isosorbide mononitrate (IMDUR) 30 MG 24 hr tablet Take 1 tablet (30 mg) by mouth daily 90 tablet 3    levothyroxine (SYNTHROID/LEVOTHROID) 175 MCG tablet Take 1 tablet (175 mcg) by mouth daily 90 tablet 3    lisinopril (ZESTRIL) 20 MG tablet Take 1 tablet (20 mg) by mouth daily 90 tablet 3    metolazone (ZAROXOLYN) 2.5 MG tablet Take 2 tablets (5 mg) by mouth 2 times daily      Multiple vitamin TABS Take 1 tablet by mouth daily      polyethylene glycol (MIRALAX) 17 g packet Take 17 g by mouth daily (Patient taking differently: Take 1 packet by mouth daily as needed) 7 packet 0    potassium chloride cammy ER (KLOR-CON M10) 10 MEQ CR tablet Take 1 tablet (10 mEq) by mouth 2 times daily 180 tablet 1    spironolactone (ALDACTONE) 25 MG tablet TAKE 1 TABLET(25 MG) BY MOUTH EVERY OTHER DAY 45 tablet 3    tamsulosin (FLOMAX) 0.4 MG capsule TAKE 1 CAPSULE(0.4 MG) BY MOUTH DAILY 90 capsule 2    TAURINE PO Take 1 capsule by mouth daily      venlafaxine (EFFEXOR) 75 MG tablet TAKE 1 TABLET(75 MG) BY MOUTH TWICE DAILY 180 tablet 0    XARELTO ANTICOAGULANT 20 MG TABS tablet TAKE 1 TABLET(20 MG) BY MOUTH DAILY WITH DINNER 30 tablet 11     Allergies   Allergen Reactions    No Known Allergies      FAMILY HISTORY NOTED AND REVIEWED    PHYSICAL EXAM:    /69 (BP Location: Left arm, Patient Position: Sitting, Cuff Size: Adult Large)   Pulse 70   Temp 96.9  F (36.1  C) (Temporal)   Resp 18   Ht 1.829 m (6')   Wt 122.5 kg (270 lb)   SpO2 94%   BMI 36.62 kg/m      Patient appears non toxic  Lungs: CTA bilat  Heart: RRR  Extr: lymphedema velcro wraps in place  Neuro: normal speech. Ambulates with cane independently    Results for orders placed or performed in visit on 04/25/24   WBC count     Status: Normal   Result Value Ref Range    WBC Count 9.6 4.0 - 11.0 10e3/uL         Assessment and Plan:     (R42) Dizziness  (primary encounter diagnosis)  Comment: resolved on lower dose of lasix  Plan: cont lasix at 20mg  bid and do not increase back to 40mg BID for now     (E11.22,  N18.2) Type 2 diabetes mellitus with stage 2 chronic kidney disease, without long-term current use of insulin (H)  Comment:   Plan:     (I50.32) Chronic diastolic heart failure (H)  Comment:   Plan: furosemide (LASIX) 40 MG tablet            (E87.6) Hypokalemia  Comment:   Plan:BMP today            (D72.829) Leukocytosis, unspecified type  Comment:   Plan: WBC count normalized            (R79.82) Elevated C-reactive protein (CRP)  Comment:   Plan:       Dasha Garcia PA-C

## 2024-04-25 NOTE — PROGRESS NOTES
[facility] Contact  [facility]/Voicemail     Clinical Data: Care Coordination ED-sourced Outreach-     Outreach attempted x 2.  Left message on patient's voicemail, providing [facility]'s 24/7 scheduling and nurse triage phone number [phone] ([phone]) for questions/concerns and/or to schedule an appt with an [facility] provider.      Care Coordination introduction letter with explanation of Clinic Care Coordination services sent to patient via [employer]. Clinic Care Coordination services remain available via referral if needed.    Plan: [facility] will do no further outreaches at this time.       VERONIKA Dove  Connected Care Resource [place], [facility]    *Connected Care Resource Team does NOT follow patient ongoing. Referrals are identified based on internal discharge reports and the outreach is to ensure patient has an understanding of their discharge instructions.

## 2024-04-26 ENCOUNTER — TRANSFERRED RECORDS (OUTPATIENT)
Dept: MULTI SPECIALTY CLINIC | Facility: CLINIC | Age: 84
End: 2024-04-26
Payer: MEDICARE

## 2024-04-26 LAB — RETINOPATHY: NORMAL

## 2024-04-26 NOTE — RESULT ENCOUNTER NOTE
Looks like you saw him yesterday    Hello -    I'm covering for Dr. Heath today    Here are my comments about the recent results: your CRP (inflammation marker) is still elevated.  Your basic metabolic panel shows normal kidney function and stable electrolytes.     Please let us know if you have any questions or concerns.    Regards,  Alexia Holland PA-C

## 2024-05-06 ENCOUNTER — MYC MEDICAL ADVICE (OUTPATIENT)
Dept: FAMILY MEDICINE | Facility: CLINIC | Age: 84
End: 2024-05-06
Payer: MEDICARE

## 2024-05-06 DIAGNOSIS — E03.9 HYPOTHYROIDISM, UNSPECIFIED TYPE: ICD-10-CM

## 2024-05-06 DIAGNOSIS — E87.6 HYPOKALEMIA: ICD-10-CM

## 2024-05-07 ENCOUNTER — ANCILLARY PROCEDURE (OUTPATIENT)
Dept: CARDIOLOGY | Facility: CLINIC | Age: 84
End: 2024-05-07
Attending: INTERNAL MEDICINE
Payer: MEDICARE

## 2024-05-07 DIAGNOSIS — Z95.0 CARDIAC PACEMAKER IN SITU: ICD-10-CM

## 2024-05-07 DIAGNOSIS — I49.5 SICK SINUS SYNDROME (H): ICD-10-CM

## 2024-05-07 RX ORDER — TAMSULOSIN HYDROCHLORIDE 0.4 MG/1
0.4 CAPSULE ORAL DAILY
Qty: 90 CAPSULE | Refills: 0 | Status: SHIPPED | OUTPATIENT
Start: 2024-05-07 | End: 2024-08-03

## 2024-05-08 LAB
MDC_IDC_EPISODE_DTM: NORMAL
MDC_IDC_EPISODE_ID: NORMAL
MDC_IDC_EPISODE_TYPE: NORMAL
MDC_IDC_LEAD_CONNECTION_STATUS: NORMAL
MDC_IDC_LEAD_CONNECTION_STATUS: NORMAL
MDC_IDC_LEAD_IMPLANT_DT: NORMAL
MDC_IDC_LEAD_IMPLANT_DT: NORMAL
MDC_IDC_LEAD_LOCATION: NORMAL
MDC_IDC_LEAD_LOCATION: NORMAL
MDC_IDC_LEAD_LOCATION_DETAIL_1: NORMAL
MDC_IDC_LEAD_LOCATION_DETAIL_1: NORMAL
MDC_IDC_LEAD_MFG: NORMAL
MDC_IDC_LEAD_MFG: NORMAL
MDC_IDC_LEAD_MODEL: NORMAL
MDC_IDC_LEAD_MODEL: NORMAL
MDC_IDC_LEAD_POLARITY_TYPE: NORMAL
MDC_IDC_LEAD_POLARITY_TYPE: NORMAL
MDC_IDC_LEAD_SERIAL: NORMAL
MDC_IDC_LEAD_SERIAL: NORMAL
MDC_IDC_MSMT_BATTERY_DTM: NORMAL
MDC_IDC_MSMT_BATTERY_REMAINING_LONGEVITY: 4 MO
MDC_IDC_MSMT_BATTERY_REMAINING_PERCENTAGE: 6 %
MDC_IDC_MSMT_BATTERY_STATUS: NORMAL
MDC_IDC_MSMT_LEADCHNL_RA_IMPEDANCE_VALUE: 424 OHM
MDC_IDC_MSMT_LEADCHNL_RA_PACING_THRESHOLD_AMPLITUDE: 0.4 V
MDC_IDC_MSMT_LEADCHNL_RA_PACING_THRESHOLD_PULSEWIDTH: 0.4 MS
MDC_IDC_MSMT_LEADCHNL_RV_IMPEDANCE_VALUE: 399 OHM
MDC_IDC_PG_IMPLANT_DTM: NORMAL
MDC_IDC_PG_MFG: NORMAL
MDC_IDC_PG_MODEL: NORMAL
MDC_IDC_PG_SERIAL: NORMAL
MDC_IDC_PG_TYPE: NORMAL
MDC_IDC_SESS_CLINIC_NAME: NORMAL
MDC_IDC_SESS_DTM: NORMAL
MDC_IDC_SESS_TYPE: NORMAL
MDC_IDC_SET_BRADY_AT_MODE_SWITCH_MODE: NORMAL
MDC_IDC_SET_BRADY_AT_MODE_SWITCH_RATE: 150 {BEATS}/MIN
MDC_IDC_SET_BRADY_LOWRATE: 60 {BEATS}/MIN
MDC_IDC_SET_BRADY_MAX_SENSOR_RATE: 130 {BEATS}/MIN
MDC_IDC_SET_BRADY_MAX_TRACKING_RATE: 130 {BEATS}/MIN
MDC_IDC_SET_BRADY_MODE: NORMAL
MDC_IDC_SET_BRADY_PAV_DELAY_HIGH: 220 MS
MDC_IDC_SET_BRADY_PAV_DELAY_LOW: 250 MS
MDC_IDC_SET_BRADY_SAV_DELAY_HIGH: 220 MS
MDC_IDC_SET_BRADY_SAV_DELAY_LOW: 250 MS
MDC_IDC_SET_LEADCHNL_RA_PACING_AMPLITUDE: 2 V
MDC_IDC_SET_LEADCHNL_RA_PACING_POLARITY: NORMAL
MDC_IDC_SET_LEADCHNL_RA_PACING_PULSEWIDTH: 0.4 MS
MDC_IDC_SET_LEADCHNL_RA_SENSING_ADAPTATION_MODE: NORMAL
MDC_IDC_SET_LEADCHNL_RA_SENSING_POLARITY: NORMAL
MDC_IDC_SET_LEADCHNL_RA_SENSING_SENSITIVITY: 0.5 MV
MDC_IDC_SET_LEADCHNL_RV_PACING_AMPLITUDE: 3.2 V
MDC_IDC_SET_LEADCHNL_RV_PACING_CAPTURE_MODE: NORMAL
MDC_IDC_SET_LEADCHNL_RV_PACING_POLARITY: NORMAL
MDC_IDC_SET_LEADCHNL_RV_PACING_PULSEWIDTH: 0.4 MS
MDC_IDC_SET_LEADCHNL_RV_SENSING_ADAPTATION_MODE: NORMAL
MDC_IDC_SET_LEADCHNL_RV_SENSING_POLARITY: NORMAL
MDC_IDC_SET_LEADCHNL_RV_SENSING_SENSITIVITY: 1 MV
MDC_IDC_SET_ZONE_DETECTION_INTERVAL: 375 MS
MDC_IDC_SET_ZONE_STATUS: NORMAL
MDC_IDC_SET_ZONE_TYPE: NORMAL
MDC_IDC_SET_ZONE_VENDOR_TYPE: NORMAL
MDC_IDC_STAT_AT_BURDEN_PERCENT: 1 %
MDC_IDC_STAT_AT_DTM_END: NORMAL
MDC_IDC_STAT_AT_DTM_START: NORMAL
MDC_IDC_STAT_BRADY_DTM_END: NORMAL
MDC_IDC_STAT_BRADY_DTM_START: NORMAL
MDC_IDC_STAT_BRADY_RA_PERCENT_PACED: 9 %
MDC_IDC_STAT_BRADY_RV_PERCENT_PACED: 0 %
MDC_IDC_STAT_EPISODE_RECENT_COUNT: 0
MDC_IDC_STAT_EPISODE_RECENT_COUNT: 17
MDC_IDC_STAT_EPISODE_RECENT_COUNT_DTM_END: NORMAL
MDC_IDC_STAT_EPISODE_RECENT_COUNT_DTM_END: NORMAL
MDC_IDC_STAT_EPISODE_RECENT_COUNT_DTM_START: NORMAL
MDC_IDC_STAT_EPISODE_RECENT_COUNT_DTM_START: NORMAL
MDC_IDC_STAT_EPISODE_TYPE: NORMAL
MDC_IDC_STAT_EPISODE_TYPE: NORMAL
MDC_IDC_STAT_EPISODE_VENDOR_TYPE: NORMAL
MDC_IDC_STAT_EPISODE_VENDOR_TYPE: NORMAL

## 2024-05-08 NOTE — TELEPHONE ENCOUNTER
Pended refill to preferred pharmacy as requested     Also need approval for early refill to pharmacy due to patient previously taking increased dose of potassium as advised by PCP     Please review and sign rx if appropriate & advise if okay for early refill. Please route back to triage to follow up with patient and pharmacy - thank you!     Tamiko Keith RN  [facility]

## 2024-05-09 RX ORDER — POTASSIUM CHLORIDE 750 MG/1
10 TABLET, EXTENDED RELEASE ORAL 2 TIMES DAILY
Qty: 180 TABLET | Refills: 1 | Status: SHIPPED | OUTPATIENT
Start: 2024-05-09

## 2024-05-14 ENCOUNTER — MYC MEDICAL ADVICE (OUTPATIENT)
Dept: FAMILY MEDICINE | Facility: CLINIC | Age: 84
End: 2024-05-14
Payer: MEDICARE

## 2024-05-14 ENCOUNTER — VIRTUAL VISIT (OUTPATIENT)
Dept: PHARMACY | Facility: CLINIC | Age: 84
End: 2024-05-14
Payer: MEDICARE

## 2024-05-14 DIAGNOSIS — E66.01 CLASS 2 SEVERE OBESITY WITH SERIOUS COMORBIDITY AND BODY MASS INDEX (BMI) OF 36.0 TO 36.9 IN ADULT, UNSPECIFIED OBESITY TYPE (H): ICD-10-CM

## 2024-05-14 DIAGNOSIS — R73.03 PRE-DIABETES: ICD-10-CM

## 2024-05-14 DIAGNOSIS — E66.812 CLASS 2 SEVERE OBESITY WITH SERIOUS COMORBIDITY AND BODY MASS INDEX (BMI) OF 36.0 TO 36.9 IN ADULT, UNSPECIFIED OBESITY TYPE (H): ICD-10-CM

## 2024-05-14 DIAGNOSIS — R60.9 EDEMA, UNSPECIFIED TYPE: Primary | ICD-10-CM

## 2024-05-14 DIAGNOSIS — E66.9 OBESITY: ICD-10-CM

## 2024-05-14 DIAGNOSIS — I25.10 CORONARY ARTERY DISEASE INVOLVING NATIVE HEART WITHOUT ANGINA PECTORIS, UNSPECIFIED VESSEL OR LESION TYPE: ICD-10-CM

## 2024-05-14 PROCEDURE — 99207 PR NO CHARGE LOS: CPT | Mod: 93 | Performed by: PHARMACIST

## 2024-05-14 NOTE — TELEPHONE ENCOUNTER
Please can we ask patient to schedule telephone visit to discuss diuretics changes and whether he needs to go back on metolazone.  Okay patient to be seen for virtual telephone visit this week.

## 2024-05-14 NOTE — PROGRESS NOTES
Medication Therapy Management (MTM) Encounter    ASSESSMENT:                            Medication Adherence/Access: No issues identified    Lymphedema:   Stable per his report.    Obesity/CAD/Pre-Diabetes:  Pharmacotherapy may be considered for those with a BMI >30 kg/m2, or a BMI of 27 to 29.9 kg/m2 with weight-related comorbidities, who have not met weight loss goals (loss of at least 5 percent of total body weight at three to six months) with a comprehensive lifestyle intervention, he is a candidate for pharmacotherapy.  Given recent FDA indication of Wegovy for CV indications, will see if insurance will cover this.    PLAN:                            Started Wegovy - see AVS for details.  Will see if insurance will cover.    Follow-up: Return Let me know if/when you hear from your pharmacy re: Wegovy, for check in via [employer].    SUBJECTIVE/OBJECTIVE:                          Jaun Craven is a 84 year old male contacted via telephone for a follow-up visit.       Reason for visit: Weight management.    Tobacco: He reports that he quit smoking about 34 years ago. His smoking use included cigarettes. He started smoking about 64 years ago. He has a 15 pack-year smoking history. He has never used smokeless tobacco.  Alcohol: none    Medication Adherence/Access: no issues reported    Patient was seen in ED on 4/22 for dizziness.  He's feeling better and has had no further recurrence of symptoms.    Lymphedema:  Furosemide 20mg twice daily   Metolazone 2.5mg twice daily  Spironolactone 25mg about 2x/week  KCl 10mEq twice daily   Has been having various diuretic adjustments.  Is doing leg wrapping, which he reports is effective.  Reports symptoms are overall improved.  Denies side effects.    Potassium   Date Value Ref Range Status   05/17/2024 4.4 3.4 - 5.3 mmol/L Final   10/18/2022 3.5 3.4 - 5.3 mmol/L Final   06/21/2021 4.1 3.4 - 5.3 mmol/L Final      Weight Management /CAD/Pre-Diabetes:  Class II Obesity (BMI 35 to  39.9):   No current medications     Jaun reports making efforts to eat healthy and exercise as he's able, but has been unable to lose weight.      Medication History:  Phentermine: contraindicated due to CV history     Wt Readings from Last 4 Encounters:   04/25/24 270 lb (122.5 kg)   04/22/24 266 lb (120.7 kg)   04/10/24 271 lb 1.6 oz (123 kg)   04/06/24 270 lb (122.5 kg)     Estimated body mass index is 36.62 kg/m  as calculated from the following:    Height as of 4/25/24: 6' (1.829 m).    Weight as of 4/25/24: 270 lb (122.5 kg).    Lab Results   Component Value Date    A1C 6.0 01/12/2024    A1C 5.8 12/15/2023    A1C 5.6 07/26/2023      Today's Vitals: There were no vitals taken for this visit.  ----------------  Post Discharge Medication Reconciliation Status: discharge medications reconciled and changed, per note/orders.    I spent 21 minutes with this patient today. All changes were made via collaborative practice agreement with Dr. Heath. A copy of the visit note was provided to the patient's provider(s).    A summary of these recommendations was sent via [employer].    Kassi Glez, PharmD, [facility]  Medication Therapy Management Provider  [phone]     Telemedicine Visit Details  Type of service:  Telephone visit  Start Time: 3:38 PM  End Time: 3:59 PM     Medication Therapy Recommendations  Obesity    Rationale: Untreated condition - Needs additional medication therapy - Indication   Recommendation: Start Medication - Wegovy 0.25 MG/0.5ML Soaj   Status: Accepted per CPA

## 2024-05-14 NOTE — TELEPHONE ENCOUNTER
Please see  message and advise.      Per last AVS from last OV with Dasha Radha on 4/25/24   metolazone 2.5 MG tablet  Commonly known as: ZAROXOLYN  For diagnoses: Bilateral lower extremity edema, Chronic  diastolic heart failure (H)  Take 2 tablets (5 mg) by mouth 2 times daily

## 2024-05-14 NOTE — TELEPHONE ENCOUNTER
Called and spoke to the patient. Assisted in getting the patient scheduled for a telephone visit to discuss his medications.     Appointments in Next Year      May 17, 2024  9:30 AM  Provider Visit with Anne Heath MD  [facility] ([facility] ) [phone]     Patient expressed understanding with the plan and had no additional questions at this time.     Kristal Gamez RN

## 2024-05-17 ENCOUNTER — LAB (OUTPATIENT)
Dept: LAB | Facility: CLINIC | Age: 84
End: 2024-05-17
Payer: MEDICARE

## 2024-05-17 ENCOUNTER — MYC MEDICAL ADVICE (OUTPATIENT)
Dept: FAMILY MEDICINE | Facility: CLINIC | Age: 84
End: 2024-05-17

## 2024-05-17 DIAGNOSIS — N17.9 ACUTE KIDNEY INJURY (H): ICD-10-CM

## 2024-05-17 DIAGNOSIS — E11.22 TYPE 2 DIABETES MELLITUS WITH STAGE 2 CHRONIC KIDNEY DISEASE, WITHOUT LONG-TERM CURRENT USE OF INSULIN (H): Primary | ICD-10-CM

## 2024-05-17 DIAGNOSIS — N18.2 TYPE 2 DIABETES MELLITUS WITH STAGE 2 CHRONIC KIDNEY DISEASE, WITHOUT LONG-TERM CURRENT USE OF INSULIN (H): Primary | ICD-10-CM

## 2024-05-17 LAB — HGB BLD-MCNC: 12.7 G/DL (ref 13.3–17.7)

## 2024-05-17 PROCEDURE — 36415 COLL VENOUS BLD VENIPUNCTURE: CPT

## 2024-05-17 PROCEDURE — 82043 UR ALBUMIN QUANTITATIVE: CPT

## 2024-05-17 PROCEDURE — 82570 ASSAY OF URINE CREATININE: CPT

## 2024-05-17 PROCEDURE — 80069 RENAL FUNCTION PANEL: CPT

## 2024-05-17 PROCEDURE — 85018 HEMOGLOBIN: CPT

## 2024-05-18 LAB
ALBUMIN SERPL BCG-MCNC: 4.2 G/DL (ref 3.5–5.2)
ANION GAP SERPL CALCULATED.3IONS-SCNC: 10 MMOL/L (ref 7–15)
BUN SERPL-MCNC: 11 MG/DL (ref 8–23)
CALCIUM SERPL-MCNC: 9.7 MG/DL (ref 8.8–10.2)
CHLORIDE SERPL-SCNC: 103 MMOL/L (ref 98–107)
CREAT SERPL-MCNC: 0.97 MG/DL (ref 0.67–1.17)
CREAT UR-MCNC: 40.7 MG/DL
DEPRECATED HCO3 PLAS-SCNC: 29 MMOL/L (ref 22–29)
EGFRCR SERPLBLD CKD-EPI 2021: 77 ML/MIN/1.73M2
GLUCOSE SERPL-MCNC: 117 MG/DL (ref 70–99)
MICROALBUMIN UR-MCNC: <12 MG/L
MICROALBUMIN/CREAT UR: NORMAL MG/G{CREAT}
PHOSPHATE SERPL-MCNC: 2.4 MG/DL (ref 2.5–4.5)
POTASSIUM SERPL-SCNC: 4.4 MMOL/L (ref 3.4–5.3)
SODIUM SERPL-SCNC: 142 MMOL/L (ref 135–145)

## 2024-05-18 NOTE — TELEPHONE ENCOUNTER
Provider called patient, he missed the appointment today for telephone visit [the ringer of his phone was down].  Discussed his concern he had labs done today.  He had questions on spironolactone and metolazone.  His swelling in the legs, is the same he uses Velcro wraps that seem to help.  He takes spironolactone every third day,  He is on metolazone 2.5 twice daily in addition to Lasix.  Advised pending lab results we will give further advise regarding the diuretics.  His breathing is fine, blood pressure and heart rate is stable, he has no further questions, patient was appreciative of follow-up phone call.  Dr Heath

## 2024-05-19 NOTE — PATIENT INSTRUCTIONS
Recommendations from today's MTM visit:                                                    MTM (medication therapy management) is a service provided by a clinical pharmacist designed to help you get the most of out of your medicines.   Today we reviewed what your medicines are for, how to know if they are working, that your medicines are safe and how to make your medicine regimen as easy as possible.      Wegovy Dosing:   Start Wegovy: It is a subcutaneous injection that you inject once weekly and titrate the dose slowly over time. Make sure inject the same time each day of the week, for example Monday evenings.  Each pen is single use.  In each prescription, you will get 4 pens in each box.  You will need a new prescription for each strength of the medication.  Week 1-4: Inject 0.25 mg once weekly  Week 5-8: If tolerating, increase to 0.5 mg once weekly  Week 9-12: If tolerating, increase to 1 mg once weekly  Week 13-15: If tolerating, increase to 1.7 mg once weekly  Week 16 & on: If tolerating, increase to 2.4 mg once weekly  *If you are having some nausea or other side effects to where you are hesitant to move up to the next dose, stay at the same dose you are on for an additional week to see if side effect(s) improves/resolves. Make sure to take this time to hydrate and ensure you are drinking at least 64 oz water per day.  If you are wanting to stay at the same dose and do not have additional refills on that prescription please reach out to the clinic.    The goal is to get to a dose that is well tolerated and effective for you. You do not have to go up on the dose each month or get to maximum dose if getting an effective response with minimal side effects.     Wegovy Administration Video: Video that was created by the .  https://www.[employer].com/watch?v=VA0b9Fx8pL3    Wegovy Storage and Stability:   Make sure that when you get the prescription that you store the prescription in the refrigerator  "until it is time to use the Wegovy pen.  Once it is time to use the Wegovy pen, you can keep the pen at room temperature and it is good for up to 28 days at room temperature.     Wegovy Common Side Effects:   Nausea, diarrhea, constipation, headache, tiredness (fatigue), dizziness, stomach upset/pain. Less commonly, Wegovy can cause low blood sugar (symptoms: shaky, dizzy, sweaty, agitation). Please reach out to the care team should you feel like this is occurring. It is important to ensure that you are eating consistent meals and not skipping meals. Ensure you are getting at least 64 oz water daily.     Follow-up: Return Let me know if/when you hear from your pharmacy re: Wegovy, for check in via "[employer]".    It was great speaking with you today.  I value your experience and would be very thankful for your time in providing feedback in our clinic survey. In the next few days, you may receive an email or text message from [employer] with a link to a survey related to your  clinical pharmacist.\"     To schedule another MTM appointment, please call the clinic directly or you may call the MTM scheduling line at [phone] or toll-free at [phone].     My Clinical Pharmacist's contact information:                                                      Please feel free to contact me with any questions or concerns you have.      Kassi Glez, PharmD, [facility]  Medication Therapy Management Provider  [phone]    "

## 2024-05-20 DIAGNOSIS — I50.32 CHRONIC DIASTOLIC HEART FAILURE (H): ICD-10-CM

## 2024-05-20 DIAGNOSIS — R60.0 BILATERAL LOWER EXTREMITY EDEMA: ICD-10-CM

## 2024-05-20 RX ORDER — METOLAZONE 2.5 MG/1
2.5 TABLET ORAL 2 TIMES DAILY
Qty: 180 TABLET | Refills: 0 | Status: SHIPPED | OUTPATIENT
Start: 2024-05-20

## 2024-05-22 ENCOUNTER — TELEPHONE (OUTPATIENT)
Dept: FAMILY MEDICINE | Facility: CLINIC | Age: 84
End: 2024-05-22
Payer: MEDICARE

## 2024-05-22 NOTE — TELEPHONE ENCOUNTER
Prior Authorization Retail Medication Request    Medication/Dose: Wegovy  Diagnosis and ICD code (if different than what is on RX):  On Rx  New/renewal/insurance change PA/secondary ins. PA: New  Previously Tried and Failed:  Phentermine: contraindicated due to CV history   Rationale:  Pharmacotherapy may be considered for those with a BMI >30 kg/m2, or a BMI of 27 to 29.9 kg/m2 with weight-related comorbidities, who have not met weight loss goals (loss of at least 5 percent of total body weight at three to six months) with a comprehensive lifestyle intervention, he is a candidate for pharmacotherapy with current BMI of 36.6.  Given concurrent CAD and pre-diabetes injectable semaglutide is preferred option.    Insurance   Primary: Medicare  Insurance ID:  [id number]     Secondary (if applicable): [facility]  Insurance ID:  [id number]

## 2024-05-24 ENCOUNTER — VIRTUAL VISIT (OUTPATIENT)
Dept: NEPHROLOGY | Facility: CLINIC | Age: 84
End: 2024-05-24
Payer: MEDICARE

## 2024-05-24 DIAGNOSIS — E83.52 HYPERCALCEMIA: ICD-10-CM

## 2024-05-24 DIAGNOSIS — N18.2 CHRONIC RENAL DISEASE, STAGE II: Primary | ICD-10-CM

## 2024-05-24 DIAGNOSIS — I89.0 LYMPHEDEMA: ICD-10-CM

## 2024-05-24 PROCEDURE — 99214 OFFICE O/P EST MOD 30 MIN: CPT | Mod: 95

## 2024-05-24 RX ORDER — SPIRONOLACTONE 25 MG/1
TABLET ORAL
Qty: 90 TABLET | Refills: 3 | Status: SHIPPED | OUTPATIENT
Start: 2024-05-24

## 2024-05-24 NOTE — NURSING NOTE
Is the patient currently in the state of [place]? YES    Visit mode:VIDEO    If the visit is dropped, the patient can be reconnected by: VIDEO VISIT: Text to cell phone:   Telephone Information:   Mobile [phone]    and VIDEO VISIT: Send to e-mail at: [email or web address]    Will anyone else be joining the visit? NO  (If patient encounters technical issues they should call [phone]    How would you like to obtain your AVS? MyChart    Are changes needed to the allergy or medication list? No    Are refills needed on medications prescribed by this physician? NO    Reason for visit: RECHECK    Zully IRAHETA

## 2024-05-24 NOTE — PROGRESS NOTES
Virtual Visit Details    Type of service:  Video Visit   Video Start Time:  0830  Video End Time: 0854    Originating Location (pt. Location): Home    Distant Location (provider location):  On-site  Platform used for Video Visit: [employer]

## 2024-05-24 NOTE — LETTER
5/24/2024       RE: Erich Craven  [address]  [place] [address]     Dear Colleague,    Thank you for referring your patient, Erich Craven, to the [facility] [place] at [facility]. Please see a copy of my visit note below.    Virtual Visit Details    Type of service:  Video Visit   Video Start Time:  0830  Video End Time: 0854    Originating Location (pt. Location): Home    Distant Location (provider location):  On-site  Platform used for Video Visit: [facility]      Nephrology Clinic Visit 5/24/24    Assessment and Plan:    1. 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 Now resolved and back to baseline   - Baseline creat had been running mid to upper 1's since 8/20 w/recurrent DONA    - Etiology of his CKD likely HTN/DONA   - Renal US normal   - Does not use NSAIDs   - Blood pressures controlled   - DM well controlled with A1c of 6.0 % ( 1/24)   - He is on statin for CV risk reduction    2. CV/HTN/Pafib, Diastolic HF/PPM - Currently well controlled w/decreased edema per patient report. Diuretics have been adjusted and he is wearing compression wraps. No dyspnea. Home b/ps 130's/. Weight stable at 266 # from 248 #, 262#. Was 292# in 6/21. Has not brought in his b/p device to be checked for accuracy. Albumin 4.2. Echo 1/24 showing EF 55%, IVC not visualized  He is on triple diuretic therapy.   Follows with Dr Jumana Dixon     Current regimen:    Isosorbide 30 mg qd   Lasix 20 mg bid   Spironolactone 25 mg 2 days per week   Lisinopril 20 mg every day   Diltiazem  mg every day   Metolazone 2.5 mg bid    - Increase Spironolactone to MWFSat. If he tolerates would increase to daily to assist with volume control and eliminate need for K replacement.      - Continue home monitoring. Will have  nursing call him next week for update    - Repeat renal panel 6/12/24    3. Electrolytes - No acute concerns. Na 142, K 4.4   - On KCL 10 meq bid    4. Acid base - No acute concerns. Bicarb 29    5. BMD - Ca 9.7 Phos 2.4 albumin 4.2   Vit D 48, PTH 64 ( 12/23)   No need for Ca/Vit D.     6. BPH - Controlled with Flomax    7. Anemia - Hgb 12.7   - Iron studies 12/23: Ferritin  Fe 98 IS 32   - On iron tab every other day.    - Does not meet LEIGH criteria     8. Disposition - RTC 6 months for follow up w/labs prior    Assessment and plan was discussed with patient and he voiced his understanding and agreement.    Reason for Visit:  CKD2/HTN    HPI:  Mr Craven is an 83 yo male with CKD2, COPD, RIN on BiPAP, Diastolic HF, SSS s/p PPM, CAD, P-Afib, HTN, BPH, h/o Pulmonary embolism, present today for CKD follow up.   Last seen in clinic by me on 12/18/23. Lisinopril decreased to 20 mg every day from 40 mg every day due to DONA/soft b/ps  Baseline creat has been < 1.1 since 7/22 w/ DONA    ROS:   Feeling well w/o complaint  Has struggled a bit since last visit with edema and meds have been adjusted. Also had the Flu in 2/24  Jaun was prescribed Wegovy for weight loss but has not yet started  Remains on CPAP  Home blood 130's  No CP/dyspnea  No abdominal concerns  Voiding w/o complication. On Flomax  Appetite is good. Has been a vegetarian for years   Walking with his dog regularly    Chronic Health Problems:    CKD2  COPD  RIN on CPAP  Diastolic HF  SSS s/p PPM  CAD  P-Afib  h/o Pulmonary embolism  HTN  Hypothyroidism  Anxiety  BPH  HLD    Family Hx:   Family History   Problem Relation Age of Onset     Brain Cancer Mother      Other Cancer Mother      Anxiety Disorder Mother      Cerebrovascular Disease Father      Hypertension Father      Obesity Father      Substance Abuse Son      Osteoporosis Sister      Thyroid Disease Sister      Anesthesia Reaction No family hx of      Thrombosis No family hx of      Personal Hx:    , lives with his dog Clayton. Lives in [facility] apt connected to the [facility] ( [facility]) . Has services and meals. He continues to set up his own medications. NS    Allergies:  Allergies   Allergen Reactions     No Known Allergies        Medications:  Current Outpatient Medications   Medication Sig Dispense Refill     spironolactone (ALDACTONE) 25 MG tablet TAKE 1 TABLET(25 MG) BY MOUTH MWFSat 90 tablet 3     acetaminophen (TYLENOL) 500 MG tablet Take 1,000 mg by mouth 3 times daily       aspirin (ASA) 81 MG chewable tablet Take 1 tablet by mouth daily       atorvastatin (LIPITOR) 80 MG tablet Take 1 tablet (80 mg) by mouth every evening 90 tablet 4     diltiazem ER COATED BEADS (CARDIZEM CD/CARTIA XT) 300 MG 24 hr capsule Take 1 capsule (300 mg) by mouth daily 90 capsule 3     ferrous sulfate (SLOW FE) 142 (45 Fe) MG CR tablet TAKE ONE TABLET BY MOUTH EVERY OTHER DAY 45 tablet 1     Fluticasone-Umeclidin-Vilanterol (TRELEGY ELLIPTA) 200-62.5-25 MCG/INH oral inhaler Inhale 1 puff into the lungs daily       furosemide (LASIX) 40 MG tablet Take 0.5 tablets (20 mg) by mouth 2 times daily       isosorbide mononitrate (IMDUR) 30 MG 24 hr tablet Take 1 tablet (30 mg) by mouth daily 90 tablet 3     levothyroxine (SYNTHROID/LEVOTHROID) 175 MCG tablet Take 1 tablet (175 mcg) by mouth daily 90 tablet 3     lisinopril (ZESTRIL) 20 MG tablet Take 1 tablet (20 mg) by mouth daily 90 tablet 3     metolazone (ZAROXOLYN) 2.5 MG tablet Take 1 tablet (2.5 mg) by mouth 2 times daily 180 tablet 0     Multiple vitamin TABS Take 1 tablet by mouth daily       polyethylene glycol (MIRALAX) 17 g packet Take 17 g by mouth daily (Patient taking differently: Take 1 packet by mouth daily as needed) 7 packet 0     potassium chloride cammy ER (KLOR-CON M10) 10 MEQ CR tablet Take 1 tablet (10 mEq) by mouth 2 times daily 180 tablet 1     Semaglutide-Weight Management (WEGOVY) 0.25 MG/0.5ML pen Inject 0.25 mg Subcutaneous  once a week 2 mL 0     tamsulosin (FLOMAX) 0.4 MG capsule Take 1 capsule (0.4 mg) by mouth daily 90 capsule 0     venlafaxine (EFFEXOR) 75 MG tablet TAKE 1 TABLET(75 MG) BY MOUTH TWICE DAILY 180 tablet 0     XARELTO ANTICOAGULANT 20 MG TABS tablet TAKE 1 TABLET(20 MG) BY MOUTH DAILY WITH DINNER 30 tablet 11     No current facility-administered medications for this visit.      Vitals:  Weight 266#     Exam:  GEN: Pleasant male in NAD  LUNGS: Breathing is non labored.    NEURO: A/O  PSYCH: Bright affect, engaging     LABS:   CMP  Recent Labs   Lab Test 05/17/24 0830 04/25/24  1022 04/22/24  2051 04/10/24  1617 08/31/21  1401 06/21/21  1454 06/14/21  1455 04/21/21  0931 03/02/21  1405    139 137 136   < > 138 137 138 136   POTASSIUM 4.4 3.8 3.0* 3.8   < > 4.1 4.7 3.7 4.5   CHLORIDE 103 98 94* 92*   < > 103 105 99 104   CO2 29 28 29 31*   < > 29 28 39* 28   ANIONGAP 10 13 14 13   < > 6 4 <1* 4   * 106* 94 108*   < > 100* 108* 104* 93   BUN 11.0 30.4* 24.5* 34.0*   < > 64* 62* 44* 57*   CR 0.97 1.15 1.09 1.20*   < > 1.57* 1.81* 1.02 1.42*   GFRESTIMATED 77 63 67 60*   < > 40* 34* 68 46*   GFRESTBLACK  --   --   --   --   --  47* 39* 79 53*   LAWSON 9.7 9.7 9.9 10.5*   < > 9.4 9.2 9.2 9.6    < > = values in this interval not displayed.     Recent Labs   Lab Test 04/22/24  2051 04/10/24  1617 02/14/24  0958 02/07/24  1038   BILITOTAL 0.5 0.4 0.6 0.7   ALKPHOS 115 110 101 115   ALT 20 24 21 20   AST 29 25 25 21     CBC  Recent Labs   Lab Test 05/17/24  0830 04/25/24  1022 04/22/24  2051 04/10/24  1617 04/06/24  1225 02/14/24  0958   HGB 12.7*  --  12.9* 12.7* 13.6 11.8*   WBC  --  9.6 11.4* 10.7 10.6 11.9*   RBC  --   --  3.97* 3.94* 4.17* 3.66*   HCT  --   --  38.7* 39.2* 41.2 37.3*   MCV  --   --  98 100 99 102*   MCH  --   --  32.5 32.2 32.6 32.2   MCHC  --   --  33.3 32.4 33.0 31.6   RDW  --   --  14.2 14.6 14.5 15.7*   PLT  --   --  210 203 201 261     URINE STUDIES  Recent Labs   Lab Test 03/06/23  0344  09/22/22  1400 07/15/22  0046 03/25/22  1855 06/14/21  1516 04/21/21  0853 12/21/20  1219   COLOR Straw Light Yellow Yellow Yellow Yellow Yellow Yellow   APPEARANCE Clear Clear Clear Clear Cloudy Clear Clear   URINEGLC Negative Negative Negative Negative Negative Negative Negative   URINEBILI Negative Negative Negative Negative Negative Negative Negative   URINEKETONE Negative Negative Negative Negative Negative Negative Negative   SG 1.005 1.019 1.022 1.015 1.020 1.015 1.015   UBLD Negative Negative Negative Negative Small* Negative Negative   URINEPH 6.5 5.0 6.5 5.5 5.0 7.0 5.0   PROTEIN Negative Negative Negative Negative 30* Negative Negative   UROBILINOGEN  --   --   --  0.2 0.2 0.2 0.2   NITRITE Negative Negative Negative Negative Negative Negative Negative   LEUKEST Negative Negative Negative Negative Moderate* Negative Small*   RBCU 0 1 1  --  Unable to quantitate other elements due to packed WBC's.*  --  O - 2   WBCU 0 1 1  --  >100*  --  10-25*     Recent Labs   Lab Test 05/23/22  1356 02/04/22  0902 10/28/21  1118 06/14/21  1516   UTPG 0.18 0.29* 0.44* 0.38*     PTH  Recent Labs   Lab Test 12/15/23  0822 05/23/22  1356 06/14/21  1455   PTHI 64 92* 107*     IRON STUDIES  Recent Labs   Lab Test 04/10/24  1617 01/12/24  1155 12/15/23  0822 10/04/23  1358 05/03/23  0809   IRON  --   --  98 68 30*   FEB  --   --  311 326 368   IRONSAT  --   --  32 21 8*   SEGUNDO 58 80  --  44 43       Tiffany Suero NP      Again, thank you for allowing me to participate in the care of your patient.      Sincerely,    Tiffany Suero NP

## 2024-05-24 NOTE — PROGRESS NOTES
Nephrology Clinic Visit 5/24/24    Assessment and Plan:    1. 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 Now resolved and back to baseline   - Baseline creat had been running mid to upper 1's since 8/20 w/recurrent DONA    - Etiology of his CKD likely HTN/DONA   - Renal US normal   - Does not use NSAIDs   - Blood pressures controlled   - DM well controlled with A1c of 6.0 % ( 1/24)   - He is on statin for CV risk reduction    2. CV/HTN/Pafib, Diastolic HF/PPM - Currently well controlled w/decreased edema per patient report. Diuretics have been adjusted and he is wearing compression wraps. No dyspnea. Home b/ps 130's/. Weight stable at 266 # from 248 #, 262#. Was 292# in 6/21. Has not brought in his b/p device to be checked for accuracy. Albumin 4.2. Echo 1/24 showing EF 55%, IVC not visualized  He is on triple diuretic therapy.   Follows with Dr Jumana Dixon     Current regimen:    Isosorbide 30 mg qd   Lasix 20 mg bid   Spironolactone 25 mg 2 days per week   Lisinopril 20 mg every day   Diltiazem  mg every day   Metolazone 2.5 mg bid    - Increase Spironolactone to MWFSat. If he tolerates would increase to daily to assist with volume control and eliminate need for K replacement.      - Continue home monitoring. Will have nursing call him next week for update    - Repeat renal panel 6/12/24    3. Electrolytes - No acute concerns. Na 142, K 4.4   - On KCL 10 meq bid    4. Acid base - No acute concerns. Bicarb 29    5. BMD - Ca 9.7 Phos 2.4 albumin 4.2   Vit D 48, PTH 64 ( 12/23)   No need for Ca/Vit D.     6. BPH - Controlled with Flomax    7. Anemia - Hgb 12.7   - Iron studies 12/23: Ferritin  Fe 98 IS 32   - On iron tab every other day.    - Does not meet LEIGH criteria     8. Disposition - RTC 6 months for follow up w/labs prior    Assessment and plan was discussed with patient and he  voiced his understanding and agreement.    Reason for Visit:  CKD2/HTN    HPI:  Mr Craven is an 85 yo male with CKD2, COPD, RIN on BiPAP, Diastolic HF, SSS s/p PPM, CAD, P-Afib, HTN, BPH, h/o Pulmonary embolism, present today for CKD follow up.   Last seen in clinic by me on 12/18/23. Lisinopril decreased to 20 mg every day from 40 mg every day due to DONA/soft b/ps  Baseline creat has been < 1.1 since 7/22 w/ DONA    ROS:   Feeling well w/o complaint  Has struggled a bit since last visit with edema and meds have been adjusted. Also had the Flu in 2/24  Jaun was prescribed Wegovy for weight loss but has not yet started  Remains on CPAP  Home blood 130's  No CP/dyspnea  No abdominal concerns  Voiding w/o complication. On Flomax  Appetite is good. Has been a vegetarian for years   Walking with his dog regularly    Chronic Health Problems:    CKD2  COPD  RIN on CPAP  Diastolic HF  SSS s/p PPM  CAD  P-Afib  h/o Pulmonary embolism  HTN  Hypothyroidism  Anxiety  BPH  HLD    Family Hx:   Family History   Problem Relation Age of Onset    Brain Cancer Mother     Other Cancer Mother     Anxiety Disorder Mother     Cerebrovascular Disease Father     Hypertension Father     Obesity Father     Substance Abuse Son     Osteoporosis Sister     Thyroid Disease Sister     Anesthesia Reaction No family hx of     Thrombosis No family hx of      Personal Hx:   , lives with his dog Clayton. Lives in [facility] living apt connected to the [facility] ( [facility]) . Has services and meals. He continues to set up his own medications. NS    Allergies:  Allergies   Allergen Reactions    No Known Allergies        Medications:  Current Outpatient Medications   Medication Sig Dispense Refill    spironolactone (ALDACTONE) 25 MG tablet TAKE 1 TABLET(25 MG) BY MOUTH MWFSat 90 tablet 3    acetaminophen (TYLENOL) 500 MG tablet Take 1,000 mg by mouth 3 times daily      aspirin (ASA) 81 MG chewable tablet Take 1 tablet by mouth daily       atorvastatin (LIPITOR) 80 MG tablet Take 1 tablet (80 mg) by mouth every evening 90 tablet 4    diltiazem ER COATED BEADS (CARDIZEM CD/CARTIA XT) 300 MG 24 hr capsule Take 1 capsule (300 mg) by mouth daily 90 capsule 3    ferrous sulfate (SLOW FE) 142 (45 Fe) MG CR tablet TAKE ONE TABLET BY MOUTH EVERY OTHER DAY 45 tablet 1    Fluticasone-Umeclidin-Vilanterol (TRELEGY ELLIPTA) 200-62.5-25 MCG/INH oral inhaler Inhale 1 puff into the lungs daily      furosemide (LASIX) 40 MG tablet Take 0.5 tablets (20 mg) by mouth 2 times daily      isosorbide mononitrate (IMDUR) 30 MG 24 hr tablet Take 1 tablet (30 mg) by mouth daily 90 tablet 3    levothyroxine (SYNTHROID/LEVOTHROID) 175 MCG tablet Take 1 tablet (175 mcg) by mouth daily 90 tablet 3    lisinopril (ZESTRIL) 20 MG tablet Take 1 tablet (20 mg) by mouth daily 90 tablet 3    metolazone (ZAROXOLYN) 2.5 MG tablet Take 1 tablet (2.5 mg) by mouth 2 times daily 180 tablet 0    Multiple vitamin TABS Take 1 tablet by mouth daily      polyethylene glycol (MIRALAX) 17 g packet Take 17 g by mouth daily (Patient taking differently: Take 1 packet by mouth daily as needed) 7 packet 0    potassium chloride cammy ER (KLOR-CON M10) 10 MEQ CR tablet Take 1 tablet (10 mEq) by mouth 2 times daily 180 tablet 1    Semaglutide-Weight Management (WEGOVY) 0.25 MG/0.5ML pen Inject 0.25 mg Subcutaneous once a week 2 mL 0    tamsulosin (FLOMAX) 0.4 MG capsule Take 1 capsule (0.4 mg) by mouth daily 90 capsule 0    venlafaxine (EFFEXOR) 75 MG tablet TAKE 1 TABLET(75 MG) BY MOUTH TWICE DAILY 180 tablet 0    XARELTO ANTICOAGULANT 20 MG TABS tablet TAKE 1 TABLET(20 MG) BY MOUTH DAILY WITH DINNER 30 tablet 11     No current facility-administered medications for this visit.      Vitals:  Weight 266#     Exam:  GEN: Pleasant male in NAD  LUNGS: Breathing is non labored.    NEURO: A/O  PSYCH: Bright affect, engaging     LABS:   CMP  Recent Labs   Lab Test 05/17/24  0830 04/25/24  1022 04/22/24  2056  04/10/24  1617 08/31/21  1401 06/21/21  1454 06/14/21  1455 04/21/21  0931 03/02/21  1405    139 137 136   < > 138 137 138 136   POTASSIUM 4.4 3.8 3.0* 3.8   < > 4.1 4.7 3.7 4.5   CHLORIDE 103 98 94* 92*   < > 103 105 99 104   CO2 29 28 29 31*   < > 29 28 39* 28   ANIONGAP 10 13 14 13   < > 6 4 <1* 4   * 106* 94 108*   < > 100* 108* 104* 93   BUN 11.0 30.4* 24.5* 34.0*   < > 64* 62* 44* 57*   CR 0.97 1.15 1.09 1.20*   < > 1.57* 1.81* 1.02 1.42*   GFRESTIMATED 77 63 67 60*   < > 40* 34* 68 46*   GFRESTBLACK  --   --   --   --   --  47* 39* 79 53*   LAWSON 9.7 9.7 9.9 10.5*   < > 9.4 9.2 9.2 9.6    < > = values in this interval not displayed.     Recent Labs   Lab Test 04/22/24  2051 04/10/24  1617 02/14/24  0958 02/07/24  1038   BILITOTAL 0.5 0.4 0.6 0.7   ALKPHOS 115 110 101 115   ALT 20 24 21 20   AST 29 25 25 21     CBC  Recent Labs   Lab Test 05/17/24  0830 04/25/24  1022 04/22/24  2051 04/10/24  1617 04/06/24  1225 02/14/24  0958   HGB 12.7*  --  12.9* 12.7* 13.6 11.8*   WBC  --  9.6 11.4* 10.7 10.6 11.9*   RBC  --   --  3.97* 3.94* 4.17* 3.66*   HCT  --   --  38.7* 39.2* 41.2 37.3*   MCV  --   --  98 100 99 102*   MCH  --   --  32.5 32.2 32.6 32.2   MCHC  --   --  33.3 32.4 33.0 31.6   RDW  --   --  14.2 14.6 14.5 15.7*   PLT  --   --  210 203 201 261     URINE STUDIES  Recent Labs   Lab Test 03/06/23  0345 09/22/22  1400 07/15/22  0046 03/25/22  1855 06/14/21  1516 04/21/21  0853 12/21/20  1219   COLOR Straw Light Yellow Yellow Yellow Yellow Yellow Yellow   APPEARANCE Clear Clear Clear Clear Cloudy Clear Clear   URINEGLC Negative Negative Negative Negative Negative Negative Negative   URINEBILI Negative Negative Negative Negative Negative Negative Negative   URINEKETONE Negative Negative Negative Negative Negative Negative Negative   SG 1.005 1.019 1.022 1.015 1.020 1.015 1.015   UBLD Negative Negative Negative Negative Small* Negative Negative   URINEPH 6.5 5.0 6.5 5.5 5.0 7.0 5.0   PROTEIN Negative  Negative Negative Negative 30* Negative Negative   UROBILINOGEN  --   --   --  0.2 0.2 0.2 0.2   NITRITE Negative Negative Negative Negative Negative Negative Negative   LEUKEST Negative Negative Negative Negative Moderate* Negative Small*   RBCU 0 1 1  --  Unable to quantitate other elements due to packed WBC's.*  --  O - 2   WBCU 0 1 1  --  >100*  --  10-25*     Recent Labs   Lab Test 05/23/22  1356 02/04/22  0902 10/28/21  1118 06/14/21  1516   UTPG 0.18 0.29* 0.44* 0.38*     PTH  Recent Labs   Lab Test 12/15/23  0822 05/23/22  1356 06/14/21  1455   PTHI 64 92* 107*     IRON STUDIES  Recent Labs   Lab Test 04/10/24  1617 01/12/24  1155 12/15/23  0822 10/04/23  1358 05/03/23  0809   IRON  --   --  98 68 30*   FEB  --   --  311 326 368   IRONSAT  --   --  32 21 8*   SEGUNDO 58 80  --  44 43       Tiffany Suero, MENDOZA

## 2024-05-24 NOTE — PATIENT INSTRUCTIONS
Increase Spironolactone to 25 mg Monday, Wed, Friday and Saturday  Have renal panel drawn on 6/12/24  RN will call you next week for update on edema and b/p

## 2024-05-31 ENCOUNTER — PATIENT OUTREACH (OUTPATIENT)
Dept: NEPHROLOGY | Facility: CLINIC | Age: 84
End: 2024-05-31
Payer: MEDICARE

## 2024-05-31 NOTE — PROGRESS NOTES
Nephrology Note: Blood Pressure Check    REASON FOR CALL:                                                      REASON FOR CALL: No chief complaint on file.    From: Tiffany Suero NP   Sent: 5/24/2024  10:10 AM CDT   To: MARIA DE JESUS Mata can you call Jaun next week and check on b/p and edema since I increased his Spironolactone to MWFSat     Thanks   Alisia   SITUATION/BACKROUND:                                                    Patient is being treated for CKD Stage 2, HTN.        ASSESSMENT:                                                    Patient called back with one blood pressure 118/62 Hr 84. Per patient his blood pressure cuff has been acting up and is in the process of getting new one. Patient states that the increase of spironolactone has been effective, no negative symptoms noted. No edema at this time when combined with wraps. No further questions or concerns at this time. Provider updated  Neph Assessments:  Recent Labs      Latest Ref Rng & Units 5/17/2024 4/25/2024 4/22/2024   Neph Labs   Sodium 135 - 145 mmol/L 142  139  137    GFR Estimate >60 mL/min/1.73m2 77  63  67    Potassium 3.4 - 5.3 mmol/L 4.4  3.8  3.0    Creatinine 0.67 - 1.17 mg/dL 0.97  1.15  1.09    Magnesium 1.7 - 2.3 mg/dL   2.1    Urea Nitrogen 8.0 - 23.0 mg/dL 11.0  30.4  24.5    Hemoglobin 13.3 - 17.7 g/dL 12.7   12.9        PLAN:                                                        Follow Up:   Patient to follow up as scheduled at next appointment  Patient to call/MyChart message with updates     Patient verbalized understanding and will follow up as recommended.    Alisson Hanley RN

## 2024-06-04 NOTE — TELEPHONE ENCOUNTER
PA Initiation    Medication: WEGOVY 0.25 MG/0.5ML SC SOAJ  Insurance Company: [employer] - Phone [phone] Fax [phone]  Pharmacy Filling the Rx: [id number] - [place] - [address]  Filling Pharmacy Phone: [phone]  Filling Pharmacy Fax: [phone]  Start Date: 6/3/2024

## 2024-06-07 ENCOUNTER — TELEPHONE (OUTPATIENT)
Dept: NEPHROLOGY | Facility: CLINIC | Age: 84
End: 2024-06-07
Payer: MEDICARE

## 2024-06-07 NOTE — TELEPHONE ENCOUNTER
Left Voicemail (1st Attempt) and Sent Mychart (1st Attempt) for the patient to schedule:    Appointment type: Return Nephrology  Provider: Alisia Suero  Return date: Approx. 11/24  Phone number: [phone]  Add'l appt(s) needed: Lab 1-hour prior to clinic visit (or 1-week if video visit)

## 2024-06-08 ENCOUNTER — OFFICE VISIT (OUTPATIENT)
Dept: URGENT CARE | Facility: URGENT CARE | Age: 84
End: 2024-06-08
Payer: MEDICARE

## 2024-06-08 VITALS
SYSTOLIC BLOOD PRESSURE: 118 MMHG | OXYGEN SATURATION: 96 % | RESPIRATION RATE: 18 BRPM | WEIGHT: 270 LBS | BODY MASS INDEX: 36.62 KG/M2 | HEART RATE: 84 BPM | DIASTOLIC BLOOD PRESSURE: 62 MMHG | TEMPERATURE: 98 F

## 2024-06-08 DIAGNOSIS — S51.812A SKIN TEAR OF FOREARM WITHOUT COMPLICATION, LEFT, INITIAL ENCOUNTER: Primary | ICD-10-CM

## 2024-06-08 PROCEDURE — 99213 OFFICE O/P EST LOW 20 MIN: CPT | Performed by: FAMILY MEDICINE

## 2024-06-08 NOTE — PROGRESS NOTES
Assessment & Plan     Skin tear of forearm without complication, left, initial encounter    Continue to keep clean and dry.  Reassured today no infection present.  Close Follow-up if any new or worsening sx prn.    Krupa Marlow MD  [facility]   Jaun is a 84 year old, presenting for the following health issues:  Urgent Care (Sore on L arm-wants to make sure it is not infected)    HPI     On warfarin.  Multiple bruises on BUEs.  Hit LEFT upper forearm on door catching it causing a superficial skin tear earlier this week.  Area now with significant bruising underlying site of trauma.  Skin tear with well-healed scab.    Presents to make sure it is not infected today.    Review of Systems  Constitutional, neuro, ENT, endocrine, pulmonary, cardiac, gastrointestinal, genitourinary, musculoskeletal, integument and psychiatric systems are negative, except as otherwise noted.      Objective    /62   Pulse 84   Temp 98  F (36.7  C) (Tympanic)   Resp 18   Wt 122.5 kg (270 lb)   SpO2 96%   BMI 36.62 kg/m    Body mass index is 36.62 kg/m .  Physical Exam   GENERAL: alert and no distress  EYES: Eyes grossly normal to inspection, PERRL and conjunctivae and sclerae normal  SKIN: superficial skin tear now scabbed over on LEFT upper forearm just distal to olecranon.  Underlying bruising noted with no erythema or skin breakdown.  No drainage.  PSYCH: mentation appears normal, affect normal/bright            Signed Electronically by: Brianna Marlow MD

## 2024-06-10 NOTE — TELEPHONE ENCOUNTER
PA Initiation    Medication: WEGOVY 0.25 MG/0.5ML SC SOAJ  Insurance Company: Other (see comments)  Ph[facility] the Rx: [id number] [place], [place] - [address] & [facility]  Filling Pharmacy Phone: [phone]  Filling Pharmacy Fax: [phone]  Start Date: 6/3/2024

## 2024-06-10 NOTE — TELEPHONE ENCOUNTER
Contacted insurance plan to follow up on request.  Per [facility] rep, requests are handled by the plan directly, phon number .  Resent in via cover my meds with different form.

## 2024-06-11 NOTE — TELEPHONE ENCOUNTER
PRIOR AUTHORIZATION DENIED    Medication: WEGOVY 0.25 MG/0.5ML SC SOAJ    Insurance Company: Other (see comments)    Denial Date: 6/10/2024    Denial Reason(s): Weight loss medications are excluded    Appeal Information:

## 2024-06-12 ENCOUNTER — ANCILLARY PROCEDURE (OUTPATIENT)
Dept: CARDIOLOGY | Facility: CLINIC | Age: 84
End: 2024-06-12
Attending: INTERNAL MEDICINE
Payer: MEDICARE

## 2024-06-12 DIAGNOSIS — Z95.0 CARDIAC PACEMAKER IN SITU: ICD-10-CM

## 2024-06-12 DIAGNOSIS — I49.5 SICK SINUS SYNDROME (H): ICD-10-CM

## 2024-06-13 LAB
MDC_IDC_EPISODE_DTM: NORMAL
MDC_IDC_EPISODE_DURATION: 13 S
MDC_IDC_EPISODE_DURATION: 8 S
MDC_IDC_EPISODE_ID: NORMAL
MDC_IDC_EPISODE_TYPE: NORMAL
MDC_IDC_EPISODE_TYPE_INDUCED: NO
MDC_IDC_LEAD_CONNECTION_STATUS: NORMAL
MDC_IDC_LEAD_CONNECTION_STATUS: NORMAL
MDC_IDC_LEAD_IMPLANT_DT: NORMAL
MDC_IDC_LEAD_IMPLANT_DT: NORMAL
MDC_IDC_LEAD_LOCATION: NORMAL
MDC_IDC_LEAD_LOCATION: NORMAL
MDC_IDC_LEAD_LOCATION_DETAIL_1: NORMAL
MDC_IDC_LEAD_LOCATION_DETAIL_1: NORMAL
MDC_IDC_LEAD_MFG: NORMAL
MDC_IDC_LEAD_MFG: NORMAL
MDC_IDC_LEAD_MODEL: NORMAL
MDC_IDC_LEAD_MODEL: NORMAL
MDC_IDC_LEAD_POLARITY_TYPE: NORMAL
MDC_IDC_LEAD_POLARITY_TYPE: NORMAL
MDC_IDC_LEAD_SERIAL: NORMAL
MDC_IDC_LEAD_SERIAL: NORMAL
MDC_IDC_MSMT_BATTERY_DTM: NORMAL
MDC_IDC_MSMT_BATTERY_REMAINING_LONGEVITY: 3 MO
MDC_IDC_MSMT_BATTERY_REMAINING_PERCENTAGE: 2 %
MDC_IDC_MSMT_BATTERY_STATUS: NORMAL
MDC_IDC_MSMT_LEADCHNL_RA_IMPEDANCE_VALUE: 441 OHM
MDC_IDC_MSMT_LEADCHNL_RA_PACING_THRESHOLD_AMPLITUDE: 0.4 V
MDC_IDC_MSMT_LEADCHNL_RA_PACING_THRESHOLD_PULSEWIDTH: 0.4 MS
MDC_IDC_MSMT_LEADCHNL_RV_IMPEDANCE_VALUE: 405 OHM
MDC_IDC_PG_IMPLANT_DTM: NORMAL
MDC_IDC_PG_MFG: NORMAL
MDC_IDC_PG_MODEL: NORMAL
MDC_IDC_PG_SERIAL: NORMAL
MDC_IDC_PG_TYPE: NORMAL
MDC_IDC_SESS_CLINIC_NAME: NORMAL
MDC_IDC_SESS_DTM: NORMAL
MDC_IDC_SESS_TYPE: NORMAL
MDC_IDC_SET_BRADY_AT_MODE_SWITCH_MODE: NORMAL
MDC_IDC_SET_BRADY_AT_MODE_SWITCH_RATE: 150 {BEATS}/MIN
MDC_IDC_SET_BRADY_LOWRATE: 60 {BEATS}/MIN
MDC_IDC_SET_BRADY_MAX_SENSOR_RATE: 130 {BEATS}/MIN
MDC_IDC_SET_BRADY_MAX_TRACKING_RATE: 130 {BEATS}/MIN
MDC_IDC_SET_BRADY_MODE: NORMAL
MDC_IDC_SET_BRADY_PAV_DELAY_HIGH: 220 MS
MDC_IDC_SET_BRADY_PAV_DELAY_LOW: 250 MS
MDC_IDC_SET_BRADY_SAV_DELAY_HIGH: 220 MS
MDC_IDC_SET_BRADY_SAV_DELAY_LOW: 250 MS
MDC_IDC_SET_LEADCHNL_RA_PACING_AMPLITUDE: 2 V
MDC_IDC_SET_LEADCHNL_RA_PACING_POLARITY: NORMAL
MDC_IDC_SET_LEADCHNL_RA_PACING_PULSEWIDTH: 0.4 MS
MDC_IDC_SET_LEADCHNL_RA_SENSING_ADAPTATION_MODE: NORMAL
MDC_IDC_SET_LEADCHNL_RA_SENSING_POLARITY: NORMAL
MDC_IDC_SET_LEADCHNL_RA_SENSING_SENSITIVITY: 0.5 MV
MDC_IDC_SET_LEADCHNL_RV_PACING_AMPLITUDE: 3.2 V
MDC_IDC_SET_LEADCHNL_RV_PACING_CAPTURE_MODE: NORMAL
MDC_IDC_SET_LEADCHNL_RV_PACING_POLARITY: NORMAL
MDC_IDC_SET_LEADCHNL_RV_PACING_PULSEWIDTH: 0.4 MS
MDC_IDC_SET_LEADCHNL_RV_SENSING_ADAPTATION_MODE: NORMAL
MDC_IDC_SET_LEADCHNL_RV_SENSING_POLARITY: NORMAL
MDC_IDC_SET_LEADCHNL_RV_SENSING_SENSITIVITY: 1 MV
MDC_IDC_SET_ZONE_DETECTION_INTERVAL: 375 MS
MDC_IDC_SET_ZONE_STATUS: NORMAL
MDC_IDC_SET_ZONE_TYPE: NORMAL
MDC_IDC_SET_ZONE_VENDOR_TYPE: NORMAL
MDC_IDC_STAT_AT_BURDEN_PERCENT: 1 %
MDC_IDC_STAT_AT_DTM_END: NORMAL
MDC_IDC_STAT_AT_DTM_START: NORMAL
MDC_IDC_STAT_BRADY_DTM_END: NORMAL
MDC_IDC_STAT_BRADY_DTM_START: NORMAL
MDC_IDC_STAT_BRADY_RA_PERCENT_PACED: 10 %
MDC_IDC_STAT_BRADY_RV_PERCENT_PACED: 0 %
MDC_IDC_STAT_EPISODE_RECENT_COUNT: 0
MDC_IDC_STAT_EPISODE_RECENT_COUNT: 18
MDC_IDC_STAT_EPISODE_RECENT_COUNT_DTM_END: NORMAL
MDC_IDC_STAT_EPISODE_RECENT_COUNT_DTM_END: NORMAL
MDC_IDC_STAT_EPISODE_RECENT_COUNT_DTM_START: NORMAL
MDC_IDC_STAT_EPISODE_RECENT_COUNT_DTM_START: NORMAL
MDC_IDC_STAT_EPISODE_TYPE: NORMAL
MDC_IDC_STAT_EPISODE_TYPE: NORMAL
MDC_IDC_STAT_EPISODE_VENDOR_TYPE: NORMAL
MDC_IDC_STAT_EPISODE_VENDOR_TYPE: NORMAL

## 2024-06-13 NOTE — TELEPHONE ENCOUNTER
Patient notified via [employer].  It looks like his plan is still reviewing CV data for semaglutide so may be able to pursue again in the future.    Kassi Glez, PharmD, [facility]  Medication Therapy Management Provider  [phone]

## 2024-06-15 ENCOUNTER — HEALTH MAINTENANCE LETTER (OUTPATIENT)
Age: 84
End: 2024-06-15

## 2024-06-19 ENCOUNTER — OFFICE VISIT (OUTPATIENT)
Dept: URGENT CARE | Facility: URGENT CARE | Age: 84
End: 2024-06-19
Payer: MEDICARE

## 2024-06-19 ENCOUNTER — NURSE TRIAGE (OUTPATIENT)
Dept: FAMILY MEDICINE | Facility: CLINIC | Age: 84
End: 2024-06-19

## 2024-06-19 VITALS
HEART RATE: 86 BPM | SYSTOLIC BLOOD PRESSURE: 145 MMHG | RESPIRATION RATE: 18 BRPM | OXYGEN SATURATION: 96 % | WEIGHT: 270 LBS | DIASTOLIC BLOOD PRESSURE: 71 MMHG | BODY MASS INDEX: 36.62 KG/M2 | TEMPERATURE: 97.8 F
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
## 2024-06-19 PROCEDURE — 99213 OFFICE O/P EST LOW 20 MIN: CPT | Performed by: PHYSICIAN ASSISTANT

## 2024-06-19 RX ORDER — SACCHAROMYCES BOULARDII 250 MG
250 CAPSULE ORAL 2 TIMES DAILY
Qty: 28 CAPSULE | Refills: 0 | Status: SHIPPED | OUTPATIENT
Start: 2024-06-19 | End: 2024-07-03

## 2024-06-19 NOTE — PROGRESS NOTES
Estimated Creatinine Clearance: 75.3 mL/min (based on SCr of 0.97 mg/dL).      SUBJECTIVE:  Erich Craven is a 84 year old male who presents to the clinic today for a rash.  Onset of rash was 2 day(s) ago.   Rash is gradual onset.  Location of the rash: arm, lower.  Quality/symptoms of rash: painful   Symptoms are mild and rash seems to be worsening.  Previous history of a similar rash? No  Recent exposure history: dog scratch    Associated symptoms include: nothing.    Past Medical History:   Diagnosis Date    (HFpEF) heart failure with preserved ejection fraction (H)     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 Started in 2012    Hypothyroidism     Lymphedema     Mumps     Obesity, Class III, BMI 40-49.9 (morbid obesity) (H)     RIN (obstructive sleep apnea)     has refused CPAP    Pacemaker     Paroxysmal A-fib (H)     PE (pulmonary thromboembolism) (H)     Status post coronary angiogram 11/05/2019     Current Outpatient Medications   Medication Sig Dispense Refill    acetaminophen (TYLENOL) 500 MG tablet Take 1,000 mg by mouth 3 times daily      amoxicillin-clavulanate (AUGMENTIN) 875-125 MG tablet Take 1 tablet by mouth 2 times daily for 7 days 14 tablet 0    aspirin (ASA) 81 MG chewable tablet Take 1 tablet by mouth daily      atorvastatin (LIPITOR) 80 MG tablet Take 1 tablet (80 mg) by mouth every evening 90 tablet 4    diltiazem ER COATED BEADS (CARDIZEM CD/CARTIA XT) 300 MG 24 hr capsule Take 1 capsule (300 mg)  by mouth daily 90 capsule 3    ferrous sulfate (SLOW FE) 142 (45 Fe) MG CR tablet TAKE ONE TABLET BY MOUTH EVERY OTHER DAY 45 tablet 1    Fluticasone-Umeclidin-Vilanterol (TRELEGY ELLIPTA) 200-62.5-25 MCG/INH oral inhaler Inhale 1 puff into the lungs daily      furosemide (LASIX) 40 MG tablet Take 0.5 tablets (20 mg) by mouth 2 times daily      isosorbide mononitrate (IMDUR) 30 MG 24 hr tablet Take 1 tablet (30 mg) by mouth daily 90 tablet 3    levothyroxine (SYNTHROID/LEVOTHROID) 175 MCG tablet Take 1 tablet (175 mcg) by mouth daily 90 tablet 3    lisinopril (ZESTRIL) 20 MG tablet Take 1 tablet (20 mg) by mouth daily 90 tablet 3    metolazone (ZAROXOLYN) 2.5 MG tablet Take 1 tablet (2.5 mg) by mouth 2 times daily 180 tablet 0    Multiple vitamin TABS Take 1 tablet by mouth daily      polyethylene glycol (MIRALAX) 17 g packet Take 17 g by mouth daily (Patient taking differently: Take 1 packet by mouth daily as needed) 7 packet 0    potassium chloride cammy ER (KLOR-CON M10) 10 MEQ CR tablet Take 1 tablet (10 mEq) by mouth 2 times daily 180 tablet 1    saccharomyces boulardii (FLORASTOR) 250 MG capsule Take 1 capsule (250 mg) by mouth 2 times daily for 14 days Take 2 hours after each augmentin dose 28 capsule 0    Semaglutide-Weight Management (WEGOVY) 0.25 MG/0.5ML pen Inject 0.25 mg Subcutaneous once a week 2 mL 0    spironolactone (ALDACTONE) 25 MG tablet TAKE 1 TABLET(25 MG) BY MOUTH MWFSat 90 tablet 3    tamsulosin (FLOMAX) 0.4 MG capsule Take 1 capsule (0.4 mg) by mouth daily 90 capsule 0    venlafaxine (EFFEXOR) 75 MG tablet TAKE 1 TABLET(75 MG) BY MOUTH TWICE DAILY 180 tablet 0    XARELTO ANTICOAGULANT 20 MG TABS tablet TAKE 1 TABLET(20 MG) BY MOUTH DAILY WITH DINNER 30 tablet 11     Social History     Tobacco Use    Smoking status: Former     Current packs/day: 0.00     Average packs/day: 0.5 packs/day for 30.0 years (15.0 ttl pk-yrs)     Types: Cigarettes     Start date: 6/15/1959     Quit date: 6/18/1989      Years since quittin.0    Smokeless tobacco: Never    Tobacco comments:     smoked from 1957 to    Substance Use Topics    Alcohol use: No     Comment: quit in ; recovering       ROS:  Review of systems negative except as stated above.    EXAM:   BP (!) 145/71   Pulse 86   Temp 97.8  F (36.6  C)   Resp 18   Wt 122.5 kg (270 lb)   SpO2 96%   BMI 36.62 kg/m    GENERAL: alert, no acute distress.  SKIN: erythema extedning from site  GENERAL APPEARANCE: healthy, alert and no distress  RESP: lungs clear to auscultation - no rales, rhonchi or wheezes  CV: regular rates and rhythm, normal S1 S2, no murmur noted  NEURO: Normal strength and tone, sensory exam grossly normal,  normal speech and mentation        ASSESSMENT:  (W54.8XXA) Dog scratch  (primary encounter diagnosis)  Plan: amoxicillin-clavulanate (AUGMENTIN) 875-125 MG         tablet, saccharomyces boulardii (FLORASTOR) 250        MG capsule      Red flags and emergent follow up discussed, and understood by patient  Follow up with PCP if symptoms worsen or fail to improve

## 2024-06-19 NOTE — TELEPHONE ENCOUNTER
Per message below. Pt agreed to seek care UC. Please advice if further recommendations from PCP. If provider agrees with plan, this encounter can be closed.

## 2024-06-19 NOTE — TELEPHONE ENCOUNTER
"Nurse Triage SBAR    Is this a 2nd Level Triage? YES, LICENSED PRACTITIONER REVIEW IS REQUIRED    Situation: Patient calling in with dog scratch that opened up a previous wound     Background:     Assessment: Patient is concerned with some blood coming though to bandage.     Protocol Recommended Disposition:   No disposition on file.    Recommendation: Patient said they are going to urgent care.  Please advise if different action needed.     Routed to provider    Does the patient meet one of the following criteria for ADS visit consideration? No    Stormy Santiago RN    Answer Assessment - Initial Assessment Questions  1. APPEARANCE of INJURY: \"What does the injury look like?\"       ON Sunday dog scratched him  2. SIZE: \"How large is the cut?\"       *No Answer*  3. BLEEDING: \"Is it bleeding now?\" If Yes, ask: \"Is it difficult to stop?\"       Bleeding now   4. LOCATION: \"Where is the injury located?\"       Right arm   5. ONSET: \"How long ago did the injury occur?\"       Sunday   6. MECHANISM: \"Tell me how it happened.\"       Dog scratch   7. TETANUS: \"When was the last tetanus booster?\"      *No Answer*  8. PREGNANCY: \"Is there any chance you are pregnant?\" \"When was your last menstrual period?\"      N/A    Protocols used: Cuts and [id number]-VO    "

## 2024-07-03 ENCOUNTER — NURSE TRIAGE (OUTPATIENT)
Dept: FAMILY MEDICINE | Facility: CLINIC | Age: 84
End: 2024-07-03

## 2024-07-03 ENCOUNTER — MYC MEDICAL ADVICE (OUTPATIENT)
Dept: FAMILY MEDICINE | Facility: CLINIC | Age: 84
End: 2024-07-03

## 2024-07-03 ENCOUNTER — VIRTUAL VISIT (OUTPATIENT)
Dept: FAMILY MEDICINE | Facility: CLINIC | Age: 84
End: 2024-07-03
Payer: MEDICARE

## 2024-07-03 DIAGNOSIS — I50.32 CHRONIC HEART FAILURE WITH PRESERVED EJECTION FRACTION (H): ICD-10-CM

## 2024-07-03 DIAGNOSIS — D32.0 BENIGN NEOPLASM OF CEREBRAL MENINGES (H): ICD-10-CM

## 2024-07-03 DIAGNOSIS — R05.9 COUGH, UNSPECIFIED TYPE: ICD-10-CM

## 2024-07-03 DIAGNOSIS — I25.119 CORONARY ARTERY DISEASE INVOLVING NATIVE CORONARY ARTERY WITH ANGINA PECTORIS, UNSPECIFIED WHETHER NATIVE OR TRANSPLANTED HEART (H): ICD-10-CM

## 2024-07-03 DIAGNOSIS — G47.33 OSA (OBSTRUCTIVE SLEEP APNEA): ICD-10-CM

## 2024-07-03 DIAGNOSIS — R11.0 NAUSEA: ICD-10-CM

## 2024-07-03 DIAGNOSIS — N18.31 STAGE 3A CHRONIC KIDNEY DISEASE (H): ICD-10-CM

## 2024-07-03 DIAGNOSIS — F10.21 ALCOHOL DEPENDENCE IN REMISSION (H): ICD-10-CM

## 2024-07-03 DIAGNOSIS — I26.99 PE (PULMONARY THROMBOEMBOLISM) (H): ICD-10-CM

## 2024-07-03 DIAGNOSIS — U07.1 INFECTION DUE TO 2019 NOVEL CORONAVIRUS: Primary | ICD-10-CM

## 2024-07-03 PROBLEM — N18.32 CHRONIC KIDNEY DISEASE, STAGE 3B (H): Status: RESOLVED | Noted: 2024-07-03 | Resolved: 2024-07-03

## 2024-07-03 PROBLEM — G89.29 CHRONIC LEFT SHOULDER PAIN: Status: RESOLVED | Noted: 2022-08-30 | Resolved: 2024-07-03

## 2024-07-03 PROBLEM — Z86.711 PERSONAL HISTORY OF PULMONARY EMBOLISM: Status: ACTIVE | Noted: 2022-09-19

## 2024-07-03 PROBLEM — N18.32 CHRONIC KIDNEY DISEASE, STAGE 3B (H): Status: ACTIVE | Noted: 2024-07-03

## 2024-07-03 PROBLEM — R73.03 PREDIABETES: Status: ACTIVE | Noted: 2022-09-19

## 2024-07-03 PROBLEM — Z98.890 STATUS POST CORONARY ANGIOGRAM: Status: RESOLVED | Noted: 2019-11-05 | Resolved: 2024-07-03

## 2024-07-03 PROBLEM — M25.512 CHRONIC LEFT SHOULDER PAIN: Status: RESOLVED | Noted: 2022-08-30 | Resolved: 2024-07-03

## 2024-07-03 PROBLEM — K59.01 SLOW TRANSIT CONSTIPATION: Status: ACTIVE | Noted: 2023-02-24

## 2024-07-03 PROBLEM — I50.33 ACUTE ON CHRONIC HEART FAILURE WITH PRESERVED EJECTION FRACTION (H): Status: ACTIVE | Noted: 2024-07-03

## 2024-07-03 PROBLEM — M19.011 PRIMARY OSTEOARTHRITIS OF RIGHT SHOULDER: Status: RESOLVED | Noted: 2021-03-05 | Resolved: 2024-07-03

## 2024-07-03 PROBLEM — Z86.718 PERSONAL HISTORY OF OTHER VENOUS THROMBOSIS AND EMBOLISM: Status: ACTIVE | Noted: 2022-09-19

## 2024-07-03 PROBLEM — G93.9 PONTINE LESION: Status: ACTIVE | Noted: 2018-08-14

## 2024-07-03 PROBLEM — I50.33 ACUTE ON CHRONIC HEART FAILURE WITH PRESERVED EJECTION FRACTION (H): Status: RESOLVED | Noted: 2024-07-03 | Resolved: 2024-07-03

## 2024-07-03 PROBLEM — Z96.612 PRESENCE OF LEFT ARTIFICIAL SHOULDER JOINT: Status: ACTIVE | Noted: 2023-02-24

## 2024-07-03 PROCEDURE — 99214 OFFICE O/P EST MOD 30 MIN: CPT | Mod: 95 | Performed by: INTERNAL MEDICINE

## 2024-07-03 RX ORDER — BENZONATATE 100 MG/1
100 CAPSULE ORAL 3 TIMES DAILY PRN
Qty: 30 CAPSULE | Refills: 0 | Status: SHIPPED | OUTPATIENT
Start: 2024-07-03 | End: 2024-08-09

## 2024-07-03 RX ORDER — ONDANSETRON 4 MG/1
4 TABLET, FILM COATED ORAL EVERY 8 HOURS PRN
Qty: 30 TABLET | Refills: 5 | Status: ON HOLD | OUTPATIENT
Start: 2024-07-03 | End: 2024-09-23

## 2024-07-03 NOTE — TELEPHONE ENCOUNTER
Cough sore throat chills   92% P     RN COVID TREATMENT VISIT  07/03/24      The patient has been triaged and does not require a higher level of care.    Erich Craven  84 year old  Current weight? 270    Has the patient been seen by a primary care provider at an [facility] or [facility] within the past two years? Yes.   Have you been in close proximity to/do you have a known exposure to a person with a confirmed case of influenza? No.     General treatment eligibility:  Date of positive COVID test (PCR or at home)?  7/3/24    Are you or have you been hospitalized for this COVID-19 infection? No.   Have you received monoclonal antibodies or antiviral treatment for COVID-19 since this positive test? No.   Do you have any of the following conditions that place you at risk of being very sick from COVID-19?   - Age 50 years or older  - Chronic lung diseases such as asthma, bronchiectasis, COPD, interstitial lung disease, pulmonary embolism, pulmonary hypertension   Yes, patient has at least one high risk condition as noted above.     Current COVID symptoms:   - fever or chills  - cough  - sore throat  - congestion or runny nose  Yes. Patient has at least one symptom as selected.     How many days since symptoms started? 5 days or less. Established patient, 12 years or older weighing at least 88.2 lbs, who has symptoms that started in the past 5 days, has not been hospitalized nor received treatment already, and is at risk for being very sick from COVID-19.     Treatment eligibility by RN:  Are you currently pregnant or nursing? No  Do you have a clinically significant hypersensitivity to nirmatrelvir or ritonavir, or toxic epidermal necrolysis (TEN) or Shaw-Tyrell Syndrome? No  Do you have a history of hepatitis, any hepatic impairment on the Problem List (such as Child-Teixeira Class C, cirrhosis, fatty liver disease, alcoholic liver disease), or was the last liver lab (hepatic panel,  ALT, AST, ALK Phos, bilirubin) elevated in the past 6 months? No  Do you have any history of severe renal impairment (eGFR < 30mL/min)? No    Is patient eligible to continue? Yes, patient meets all eligibility requirements for the RN COVID treatment (as denoted by all no responses above).     Current Outpatient Medications   Medication Sig Dispense Refill    acetaminophen (TYLENOL) 500 MG tablet Take 1,000 mg by mouth 3 times daily      aspirin (ASA) 81 MG chewable tablet Take 1 tablet by mouth daily      atorvastatin (LIPITOR) 80 MG tablet Take 1 tablet (80 mg) by mouth every evening 90 tablet 4    diltiazem ER COATED BEADS (CARDIZEM CD/CARTIA XT) 300 MG 24 hr capsule Take 1 capsule (300 mg) by mouth daily 90 capsule 3    ferrous sulfate (SLOW FE) 142 (45 Fe) MG CR tablet TAKE ONE TABLET BY MOUTH EVERY OTHER DAY 45 tablet 1    Fluticasone-Umeclidin-Vilanterol (TRELEGY ELLIPTA) 200-62.5-25 MCG/INH oral inhaler Inhale 1 puff into the lungs daily      furosemide (LASIX) 40 MG tablet Take 0.5 tablets (20 mg) by mouth 2 times daily      isosorbide mononitrate (IMDUR) 30 MG 24 hr tablet Take 1 tablet (30 mg) by mouth daily 90 tablet 3    levothyroxine (SYNTHROID/LEVOTHROID) 175 MCG tablet Take 1 tablet (175 mcg) by mouth daily 90 tablet 3    lisinopril (ZESTRIL) 20 MG tablet Take 1 tablet (20 mg) by mouth daily 90 tablet 3    metolazone (ZAROXOLYN) 2.5 MG tablet Take 1 tablet (2.5 mg) by mouth 2 times daily 180 tablet 0    Multiple vitamin TABS Take 1 tablet by mouth daily      polyethylene glycol (MIRALAX) 17 g packet Take 17 g by mouth daily (Patient taking differently: Take 1 packet by mouth daily as needed) 7 packet 0    potassium chloride cammy ER (KLOR-CON M10) 10 MEQ CR tablet Take 1 tablet (10 mEq) by mouth 2 times daily 180 tablet 1    saccharomyces boulardii (FLORASTOR) 250 MG capsule Take 1 capsule (250 mg) by mouth 2 times daily for 14 days Take 2 hours after each augmentin dose 28 capsule 0     "Semaglutide-Weight Management (WEGOVY) 0.25 MG/0.5ML pen Inject 0.25 mg Subcutaneous once a week 2 mL 0    spironolactone (ALDACTONE) 25 MG tablet TAKE 1 TABLET(25 MG) BY MOUTH MWFSat 90 tablet 3    tamsulosin (FLOMAX) 0.4 MG capsule Take 1 capsule (0.4 mg) by mouth daily 90 capsule 0    venlafaxine (EFFEXOR) 75 MG tablet TAKE 1 TABLET(75 MG) BY MOUTH TWICE DAILY 180 tablet 0    XARELTO ANTICOAGULANT 20 MG TABS tablet TAKE 1 TABLET(20 MG) BY MOUTH DAILY WITH DINNER 30 tablet 11       Medications from List 1 of the standing order (on medications that exclude the use of Paxlovid) that patient is taking: NONE. Is patient taking Rita's Wort? No  Is patient taking [employer]'s Wort or any meds from List 1? No.   Medications from List 2 of the standing order (on meds that provider needs to adjust) that patient is taking: diltiazem (Cardizem, Tiazac), explained a provider visit is necessary to discuss medication adjustments while taking Paxlovid. Is patient on any of the meds from List 2? Yes. Patient will be scheduled or transferred to a  at the end of this call.   Ayse Ayers RN       Answer Assessment - Initial Assessment Questions  1. COVID-19 DIAGNOSIS: \"How do you know that you have COVID?\" (e.g., positive lab test or self-test, diagnosed by doctor or NP/PA, symptoms after exposure).      Home test today     2. COVID-19 EXPOSURE: \"Was there any known exposure to COVID before the symptoms began?\" CDC Definition of close contact: within 6 feet (2 meters) for a total of 15 minutes or more over a 24-hour period.       Yes at wedding this weekend     3. ONSET: \"When did the COVID-19 symptoms start?\"       Monday     4. WORST SYMPTOM: \"What is your worst symptom?\" (e.g., cough, fever, shortness of breath, muscle aches)      Cough and congestion     5. COUGH: \"Do you have a cough?\" If Yes, ask: \"How bad is the cough?\"        Yes   6. FEVER: \"Do you have a fever?\" If Yes, ask: \"What is your temperature, how was " "it measured, and when did it start?\"      Maybe but not thermometer     7. RESPIRATORY STATUS: \"Describe your breathing?\" (e.g., normal; shortness of breath, wheezing, unable to speak)       No    8. BETTER-SAME-WORSE: \"Are you getting better, staying the same or getting worse compared to yesterday?\"  If getting worse, ask, \"In what way?\"      Worse    9. OTHER SYMPTOMS: \"Do you have any other symptoms?\"  (e.g., chills, fatigue, headache, loss of smell or taste, muscle pain, sore throat)      Cough, congestion, sore throat, chills     10. HIGH RISK DISEASE: \"Do you have any chronic medical problems?\" (e.g., asthma, heart or lung disease, weak immune system, obesity, etc.)        No    11. VACCINE: \"Have you had the COVID-19 vaccine?\" If Yes, ask: \"Which one, how many shots, when did you get it?\"        All     12. PREGNANCY: \"Is there any chance you are pregnant?\" \"When was your last menstrual period?\"        No    13. O2 SATURATION MONITOR:  \"Do you use an oxygen saturation monitor (pulse oximeter) at home?\" If Yes, ask \"What is your reading (oxygen level) today?\" \"What is your usual oxygen saturation reading?\" (e.g., 95%)        92    Protocols used: Coronavirus (COVID-19) Diagnosed or [id number]    "

## 2024-07-03 NOTE — PATIENT INSTRUCTIONS
As discussed, will consider Molnupiravir which doesn't have interactions with your Xarelto     Sent in Nausea pill as well as cough pearles which you can take as needed for your symptoms . ( Please be aware that you will follow up with your PCP if you need further refills of them )     ================    As you recover from COVID-19     Patients who have symptoms (cough, fever, or shortness of breath), need to isolate for 10 days from when symptoms started AND 72 hours after fever resolves (without fever reducing medications) AND improvement of respiratory symptoms (whichever is longer).     During this time:  Isolate yourself at home (in own room/own bathroom if possible)  Do not allow any visitors  Do not go to work or school  Do not go to [employer],  centers, shopping, or other public places  Do not shake hands  Avoid close and intimate contact with others (hugging, kissing)  Follow CDC recommendations for household cleaning of frequently touched services     After the initial 10 days, continue to isolate yourself from household members as much as possible. To continue decrease the risk of community spread and exposure, you and any members of your household should limit activities in public for 14 days after starting home isolation.      You can reference the following CDC link for helpful home isolation/care tips:  https://www.cdc.gov/coronavirus/2019-ncov/downloads/10Things.pdf     Protect Others:  Cover your mouth and nose with a mask, disposable tissue or wash cloth to avoid spreading germs.  Wash your hands and face often. Use soap and water     Call Back If: Breathing difficulty develops or you become worse.        For more information about COVID19 and options for caring for yourself at home, please visit the CDC website at https://www.cdc.gov/coronavirus/2019-ncov/about/steps-when-sick.html  For more options for care at [facility], please visit our website at  [email or web address]    For information about [facility] COVID-19 Clinical Trials, please visit [email or web address]     For more information, please use the [place] Department of Health COVID-19 Website: [email or web address].html  [place] Department of Health ([facility]) COVID-19 Hotlines (Interpreters   available):              Health questions: Phone Number: 785.950.9906 or 1-968.738.8577 and Hours: 7 a.m. to 7 p.m.  Schools and  questions: Phone Number: [phone] or 1-345.147.1048 and Hours 7 a.m. to 7 p.m.       Coping with Life After COVID-19  Being in the hospital because of COVID-19 is scary. Going home can be scary, too. You may face changes to your life, the way you work or what you can eat. It s hard to adjust to change, and it s normal to feel afraid, frustrated or even angry. These feelings usually go away over time. If your feelings don t start to get better, it s called  adjustment disorder.       What signs should I look out for?  Adjustment disorder can happen to anyone in a time of stress. It makes it hard to cope with daily life. You may feel lonely or fight with loved ones, even if you re glad to be home. Watch for these signs:  Fear or worry  Hard time focusing  Sadness or anger  Trouble talking to family or friends  Feeling like you don t fit in or isolating yourself  Problems with sleep   Drinking alcohol or taking drugs to cope     What can I do?  You can help yourself get better. Feeling you have control helps you move forward. You may wonder if you ll be able to do things you did before. Be patient. Do your best to make the most of every day. Try to build relationships, be as active as you can, eat right and keep a sense of humor. Avoid smoking and drinking too much alcohol. Call your family doctor or clinic if you re not sure what to do. They can guide you to care or  other services.     When should I get help?  Think about getting counseling if your sadness or frustration gets worse. Together with a trained counselor, you can talk about your problems adjusting to life after your hospital stay. You can come up with new ways to handle changes that give you more control. Your family doctor or care team can help you find a counselor.      Get help if you re thinking about hurting yourself. If you need help right away, call 911 or go to the nearest emergency room. You can also try the Crisis Text Line.     Crisis Text Line: 960-146 (http://www.crisistextline.org)  The Crisis Text Line serves anyone, in any crisis. It gives free, 24/7 support. Here's how it works:  Text HOME to 053343 from anywhere in the [place], anytime, about any type of crisis.  A live, trained Crisis Counselor will text you back quickly.  The volunteer Crisis Counselor can help you move from a  hot moment  to a  cool moment.  They can also help you work out a safety plan.

## 2024-07-03 NOTE — PROGRESS NOTES
Jaun is a 84 year old who is being evaluated via a billable video visit.    How would you like to obtain your AVS? MyChart  If the video visit is dropped, the invitation should be resent by: Text to cell phone: [phone]  Will anyone else be joining your video visit? No        Assessment and Plan  1. Infection due to 2019 novel coronavirus  New problem of acute concerns on upper respiratory symptoms as well as nausea with associated cough.  Patient endorses that his Symptoms started on 6/30/2024.  Does have Xarelto on his list which is interacting with Paxlovid and cannot be given.  -Shared decision for molnupiravir to be prescribed for improvement of his symptoms.  ER precautions given as well as AVS with further details on the plan given.  Patient understood and agreed the plan  - molnupiravir (LAGEVRIO) 200 MG capsule; Take 4 capsules (800 mg) by mouth every 12 hours for 5 days  Dispense: 40 each; Refill: 0    2. Nausea  - ondansetron (ZOFRAN) 4 MG tablet; Take 1 tablet (4 mg) by mouth every 8 hours as needed for nausea  Dispense: 30 tablet; Refill: 5    3. Cough, unspecified type  - benzonatate (TESSALON) 100 MG capsule; Take 1 capsule (100 mg) by mouth 3 times daily as needed for cough  Dispense: 30 capsule; Refill: 0    4. Chronic heart failure with preserved ejection fraction (H)  5. PE (pulmonary thromboembolism) (H)  6. Alcohol dependence in remission (H)  7. Stage 3a chronic kidney disease (H)  8. Benign neoplasm of cerebral meninges (H)  9. Coronary artery disease involving native coronary artery with angina pectoris, unspecified whether native or transplanted heart (H24)  10. RIN (obstructive sleep apnea)  Chronic stable conditions as managed by PCP, which are significant risk factors for the above COVID-positive.  Patient endorses that he is currently on BiPAP on nocturnal oxygen         Please note that this note consists of symbols derived from keyboarding, dictation and/or voice recognition  software. As a result, there may be errors in the script that have gone undetected. Please consider this when interpreting information found in this chart.    Patient Instructions   As discussed, will consider Molnupiravir which doesn't have interactions with your Xarelto     Sent in Nausea pill as well as cough pearles which you can take as needed for your symptoms . ( Please be aware that you will follow up with your PCP if you need further refills of them )     ================    As you recover from COVID-19     Patients who have symptoms (cough, fever, or shortness of breath), need to isolate for 10 days from when symptoms started AND 72 hours after fever resolves (without fever reducing medications) AND improvement of respiratory symptoms (whichever is longer).     During this time:  Isolate yourself at home (in own room/own bathroom if possible)  Do not allow any visitors  Do not go to work or school  Do not go to [employer],  centers, shopping, or other public places  Do not shake hands  Avoid close and intimate contact with others (hugging, kissing)  Follow CDC recommendations for household cleaning of frequently touched services     After the initial 10 days, continue to isolate yourself from household members as much as possible. To continue decrease the risk of community spread and exposure, you and any members of your household should limit activities in public for 14 days after starting home isolation.      You can reference the following CDC link for helpful home isolation/care tips:  https://www.cdc.gov/coronavirus/2019-ncov/downloads/10Things.pdf     Protect Others:  Cover your mouth and nose with a mask, disposable tissue or wash cloth to avoid spreading germs.  Wash your hands and face often. Use soap and water     Call Back If: Breathing difficulty develops or you become worse.        For more information about COVID19 and options for caring for yourself at home, please visit the CDC  website at https://www.cdc.gov/coronavirus/2019-ncov/about/steps-when-sick.html  For more options for care at [facility], please visit our website at [email or web address]    For information about [facility] COVID-19 Clinical Trials, please visit [email or web address]     For more information, please use the [place] Department of Health COVID-19 Website: [email or web address]  [place] Department of Health ([facility]) COVID-19 Hotlines (Interpreters   available):              Health questions: Phone Number: [phone] or 1-972.485.5640 and Hours: 7 a.m. to 7 p.m.  Schools and  questions: Phone Number: [phone] or [phone] and Hours 7 a.m. to 7 p.m.       Coping with Life After COVID-19  Being in the hospital because of COVID-19 is scary. Going home can be scary, too. You may face changes to your life, the way you work or what you can eat. It s hard to adjust to change, and it s normal to feel afraid, frustrated or even angry. These feelings usually go away over time. If your feelings don t start to get better, it s called  adjustment disorder.       What signs should I look out for?  Adjustment disorder can happen to anyone in a time of stress. It makes it hard to cope with daily life. You may feel lonely or fight with loved ones, even if you re glad to be home. Watch for these signs:  Fear or worry  Hard time focusing  Sadness or anger  Trouble talking to family or friends  Feeling like you don t fit in or isolating yourself  Problems with sleep   Drinking alcohol or taking drugs to cope     What can I do?  You can help yourself get better. Feeling you have control helps you move forward. You may wonder if you ll be able to do things you did before. Be patient. Do your best to make the most of every day. Try to build relationships, be as active as you can, eat right and keep a  sense of humor. Avoid smoking and drinking too much alcohol. Call your family doctor or clinic if you re not sure what to do. They can guide you to care or other services.     When should I get help?  Think about getting counseling if your sadness or frustration gets worse. Together with a trained counselor, you can talk about your problems adjusting to life after your hospital stay. You can come up with new ways to handle changes that give you more control. Your family doctor or care team can help you find a counselor.      Get help if you re thinking about hurting yourself. If you need help right away, call 911 or go to the nearest emergency room. You can also try the Crisis Text Line.     Crisis Text Line: 584-237 (http://www.crisistextline.org)  The Crisis Text Line serves anyone, in any crisis. It gives free, 24/7 support. Here's how it works:  Text HOME to 410405 from anywhere in the [place], anytime, about any type of crisis.  A live, trained Crisis Counselor will text you back quickly.  The volunteer Crisis Counselor can help you move from a  hot moment  to a  cool moment.  They can also help you work out a safety plan.    Return in about 10 days (around 7/13/2024), or if symptoms worsen or fail to improve, for If symptoms persist, Follow up of last visit.    Clemencia Cooley MD  [facility] FRANKIE Zamorano is a 84 year old, presenting for the following health issues:  Covid Concern        7/3/2024     5:06 PM   Additional Questions   Roomed by Jennie HERNANDEZ       History of Present Illness       Reason for visit:  Tested positive for Covid    He eats 2-3 servings of fruits and vegetables daily.He consumes 2 sweetened beverage(s) daily.He exercises with enough effort to increase his heart rate 20 to 29 minutes per day.  He exercises with enough effort to increase his heart rate 4 days per week.   He is taking medications regularly.         COVID-19 Symptom Review  How many days ago  did these symptoms start? 6/29/24    Are any of the following symptoms significant for you?  New or worsening difficulty breathing? No  Worsening cough? Yes, it's a dry cough.   Fever or chills? Yes, I felt feverish or had chills.  Headache: No  Sore throat: YES  Chest pain: No  Diarrhea: YES  Body aches? YES    What treatments has patient tried? Decongestant - oral   Does patient live in a nursing home, group home, or shelter? Lives in an Independent living facility   Does patient have a way to get food/medications during quarantined? Yes, I have a friend or family member who can help me. Would like son to get medication from pharmacy. Yan Craven is his name,            Patient is new to me, seeing me for the first time for COVID-positive on this virtual visit.  Patient unable to get his Paxlovid due to drug interactions and here with me.    Allergies   Allergen Reactions    No Known Allergies         Past Medical History:   Diagnosis Date    (HFpEF) heart failure with preserved ejection fraction (H)     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 Started in 2012    Hypothyroidism     Lymphedema     Mumps     Obesity, Class III, BMI 40-49.9 (morbid obesity) (H)     RIN (obstructive sleep apnea)     has refused CPAP    Pacemaker     Paroxysmal A-fib (H)     PE (pulmonary thromboembolism) (H)     Status post coronary angiogram 11/05/2019       Past Surgical History:   Procedure Laterality Date    cardiac stenting       COLONOSCOPY  2010    CV RIGHT HEART CATH MEASUREMENTS RECORDED N/A 11/05/2019    Procedure: Right Heart Cath;  Surgeon: Roberto Hernandez MD;  [facility]    ENT SURGERY      tonsillectomy    EYE SURGERY  2012    IMPLANT PACEMAKER      INJECT NERVE BLOCK SUPRASCAPULAR Bilateral 08/26/2021    Procedure: BLOCK, NERVE, SUPRASCAPULAR, Bilateral, under ultrasound guidance;  Surgeon: Swetha Allen MD;  Location: [facility] OR    INJECT NERVE BLOCK SUPRASCAPULAR Bilateral 09/23/2021    Procedure: bilateral suprascapular nerve block;  Surgeon: Swetha Allen MD;  Location: [facility] OR    INJECT NERVE BLOCK SUPRASCAPULAR Left 09/15/2022    Procedure: BLOCK, NERVE, SUPRASCAPULAR- left;  Surgeon: Swetha Allen MD;  Location: [facility] OR    INJECT NERVE BLOCK SUPRASCAPULAR Left 10/20/2022    Procedure: BLOCK, NERVE, SUPRASCAPULAR- left;  Surgeon: Swetha Allen MD;  Location: [facility] OR    INJECT NERVE BLOCK SUPRASCAPULAR Left 01/05/2023    Procedure: BLOCK, NERVE, SUPRASCAPULAR (left);  Surgeon: Swetha Allen MD;  Location: [facility] OR    NERVE BLOCK PERIPHERAL Bilateral 03/10/2022    Procedure: bilateral suprascapular nerve block;  Surgeon: Swetha Allen MD;  Location: [facility] OR    ORTHOPEDIC SURGERY Right 2012    knee replacement    REVERSE ARTHROPLASTY SHOULDER Left 02/21/2023    Procedure: ARTHROPLASTY, SHOULDER, TOTAL, REVERSE LEFT;  Surgeon: Katelyn Vasquez MD;  Location: [facility]    VASCULAR SURGERY  2022       Family History   Problem Relation Age of Onset    Brain Cancer Mother     Other Cancer Mother     Anxiety Disorder Mother     Cerebrovascular Disease Father     Hypertension Father     Obesity Father     Substance Abuse Son     Osteoporosis Sister     Thyroid Disease Sister     Anesthesia Reaction No family hx of     Thrombosis No family hx of        Social History     Tobacco Use    Smoking status: Former     Current packs/day: 0.00     Average packs/day: 0.5 packs/day for 30.0 years (15.0 ttl  pk-yrs)     Types: Cigarettes     Start date: 6/15/1959     Quit date: 1989     Years since quittin.0    Smokeless tobacco: Never    Tobacco comments:     smoked from  to    Substance Use Topics    Alcohol use: No     Comment: quit in ; recovering        Current Outpatient Medications   Medication Sig Dispense Refill    acetaminophen (TYLENOL) 500 MG tablet Take 1,000 mg by mouth 3 times daily      aspirin (ASA) 81 MG chewable tablet Take 1 tablet by mouth daily      atorvastatin (LIPITOR) 80 MG tablet Take 1 tablet (80 mg) by mouth every evening 90 tablet 4    benzonatate (TESSALON) 100 MG capsule Take 1 capsule (100 mg) by mouth 3 times daily as needed for cough 30 capsule 0    diltiazem ER COATED BEADS (CARDIZEM CD/CARTIA XT) 300 MG 24 hr capsule Take 1 capsule (300 mg) by mouth daily 90 capsule 3    ferrous sulfate (SLOW FE) 142 (45 Fe) MG CR tablet TAKE ONE TABLET BY MOUTH EVERY OTHER DAY 45 tablet 1    Fluticasone-Umeclidin-Vilanterol (TRELEGY ELLIPTA) 200-62.5-25 MCG/INH oral inhaler Inhale 1 puff into the lungs daily      furosemide (LASIX) 40 MG tablet Take 0.5 tablets (20 mg) by mouth 2 times daily      isosorbide mononitrate (IMDUR) 30 MG 24 hr tablet Take 1 tablet (30 mg) by mouth daily 90 tablet 3    levothyroxine (SYNTHROID/LEVOTHROID) 175 MCG tablet Take 1 tablet (175 mcg) by mouth daily 90 tablet 3    lisinopril (ZESTRIL) 20 MG tablet Take 1 tablet (20 mg) by mouth daily 90 tablet 3    metolazone (ZAROXOLYN) 2.5 MG tablet Take 1 tablet (2.5 mg) by mouth 2 times daily 180 tablet 0    molnupiravir (LAGEVRIO) 200 MG capsule Take 4 capsules (800 mg) by mouth every 12 hours for 5 days 40 each 0    Multiple vitamin TABS Take 1 tablet by mouth daily      ondansetron (ZOFRAN) 4 MG tablet Take 1 tablet (4 mg) by mouth every 8 hours as needed for nausea 30 tablet 5    polyethylene glycol (MIRALAX) 17 g packet Take 17 g by mouth daily (Patient taking differently: Take 1 packet by mouth  daily as needed) 7 packet 0    potassium chloride cammy ER (KLOR-CON M10) 10 MEQ CR tablet Take 1 tablet (10 mEq) by mouth 2 times daily 180 tablet 1    saccharomyces boulardii (FLORASTOR) 250 MG capsule Take 1 capsule (250 mg) by mouth 2 times daily for 14 days Take 2 hours after each augmentin dose 28 capsule 0    spironolactone (ALDACTONE) 25 MG tablet TAKE 1 TABLET(25 MG) BY MOUTH MWFSat 90 tablet 3    tamsulosin (FLOMAX) 0.4 MG capsule Take 1 capsule (0.4 mg) by mouth daily 90 capsule 0    venlafaxine (EFFEXOR) 75 MG tablet TAKE 1 TABLET(75 MG) BY MOUTH TWICE DAILY 180 tablet 0    XARELTO ANTICOAGULANT 20 MG TABS tablet TAKE 1 TABLET(20 MG) BY MOUTH DAILY WITH DINNER 30 tablet 11    Semaglutide-Weight Management (WEGOVY) 0.25 MG/0.5ML pen Inject 0.25 mg Subcutaneous once a week (Patient not taking: Reported on 7/3/2024) 2 mL 0     No current facility-administered medications for this visit.          Review of Systems  Constitutional, HEENT, cardiovascular, pulmonary, GI, , musculoskeletal, neuro, skin, endocrine and psych systems are negative, except as otherwise noted.      Objective    Vitals - Patient Reported  Pain Score: No Pain (0)      Vitals:  No vitals were obtained today due to virtual visit.    Physical Exam   GENERAL: alert and no distress  EYES: Eyes grossly normal to inspection.  No discharge or erythema, or obvious scleral/conjunctival abnormalities.  RESP: No audible wheeze, cough, or visible cyanosis.    SKIN: Visible skin clear. No significant rash, abnormal pigmentation or lesions.  NEURO: Cranial nerves grossly intact.  Mentation and speech appropriate for age.  PSYCH: Appropriate affect, tone, and pace of words      Video-Visit Details    Type of service:  Video Visit   Originating Location (pt. Location): Home    Distant Location (provider location):  On-site  Platform used for Video Visit: Nayla  Signed Electronically by: Clemencia Cooley MD

## 2024-07-08 DIAGNOSIS — F41.9 ANXIETY: ICD-10-CM

## 2024-07-08 RX ORDER — VENLAFAXINE 75 MG/1
TABLET ORAL
Qty: 180 TABLET | Refills: 0 | Status: SHIPPED | OUTPATIENT
Start: 2024-07-08 | End: 2024-10-07

## 2024-07-11 ENCOUNTER — E-VISIT (OUTPATIENT)
Dept: URGENT CARE | Facility: CLINIC | Age: 84
End: 2024-07-11
Payer: MEDICARE

## 2024-07-11 DIAGNOSIS — R05.1 ACUTE COUGH: Primary | ICD-10-CM

## 2024-07-11 DIAGNOSIS — J06.9 VIRAL URI WITH COUGH: Primary | ICD-10-CM

## 2024-07-11 PROCEDURE — 99207 PR NON-BILLABLE SERV PER CHARTING: CPT | Performed by: NURSE PRACTITIONER

## 2024-07-11 PROCEDURE — 99207 PR NON-BILLABLE SERV PER CHARTING: CPT | Performed by: PHYSICIAN ASSISTANT

## 2024-07-11 NOTE — PATIENT INSTRUCTIONS
Dear Erich Craven,    We are sorry you are not feeling well. Based on the responses you provided, it is recommended that you be seen in-person in urgent care so we can better evaluate your symptoms. Please click here to find the nearest urgent care location to you.   You will not be charged for this Visit. Thank you for trusting us with your care.    Sherin Das, CNP

## 2024-07-12 ENCOUNTER — VIRTUAL VISIT (OUTPATIENT)
Dept: INTERNAL MEDICINE | Facility: CLINIC | Age: 84
End: 2024-07-12
Payer: MEDICARE

## 2024-07-12 DIAGNOSIS — U07.1 INFECTION DUE TO 2019 NOVEL CORONAVIRUS: Primary | ICD-10-CM

## 2024-07-12 PROCEDURE — 99442 PR PHYSICIAN TELEPHONE EVALUATION 11-20 MIN: CPT | Mod: 93

## 2024-07-12 NOTE — PROGRESS NOTES
Jaun is a 84 year old who is being evaluated via a billable video visit.    How would you like to obtain your AVS? MyChart  If the video visit is dropped, the invitation should be resent by: Text to cell phone: [phone]  Will anyone else be joining your video visit? No  Assessment & Plan   (U07.1) Infection due to 2019 novel coronavirus  (primary encounter diagnosis)  Comment: Patient was positive for COVID on 7/3/24 and continue to gave a cough. Patient was put on antibiotics by their PCP on 7/11/24. Patient had further questions and concerns. Patient denies SOB or chest pain. Discussed that cough can be ongoing for several weeks, discussed being seen in person for any further concerns or to go to ED if they have SOB or chest pain.   Plan: Will update PCP or go to ED if symptoms worsen.          BMI  Estimated body mass index is 36.62 kg/m  as calculated from the following:    Height as of 4/25/24: 1.829 m (6').    Weight as of 6/19/24: 122.5 kg (270 lb).             Subjective   Jaun is a 84 year old, presenting for the following health issues:  Covid Concern        7/12/2024    10:37 AM   Additional Questions   Roomed by Justine Kern         History of Present Illness       Reason for visit:  Tested positive for Covid    He eats 2-3 servings of fruits and vegetables daily.He consumes 2 sweetened beverage(s) daily.He exercises with enough effort to increase his heart rate 20 to 29 minutes per day.  He exercises with enough effort to increase his heart rate 4 days per week.   He is taking medications regularly.         COVID-19 Symptom Review  How many days ago did these symptoms start? 07/01/2024    Are any of the following symptoms significant for you?  New or worsening difficulty breathing? No  Worsening cough? Yes, I am coughing up mucus.  Fever or chills? No  Headache: YES  Sore throat: YES  Chest pain: No  Diarrhea: No  Body aches? YES    What treatments has patient tried? Antiviral for covid which he  finished this past Monday. He is currently on azithromycin and tessalon pearls    Does patient live in a nursing home, group home, or shelter? Independent living  Does patient have a way to get food/medications during quarantined? Yes, I have a friend or family member who can help me.           Objective           Vitals:  No vitals were obtained today due to telephone visit.    Physical Exam       TELEPHONE VISIT    Distant Location (provider location):  On-site  Platform used for Visit: Telephone  Signed Electronically by: VAMSI Laughlin CNP

## 2024-07-15 ENCOUNTER — VIRTUAL VISIT (OUTPATIENT)
Dept: FAMILY MEDICINE | Facility: CLINIC | Age: 84
End: 2024-07-15
Payer: MEDICARE

## 2024-07-15 DIAGNOSIS — U07.1 INFECTION DUE TO 2019 NOVEL CORONAVIRUS: Primary | ICD-10-CM

## 2024-07-15 DIAGNOSIS — E03.9 HYPOTHYROIDISM, UNSPECIFIED TYPE: ICD-10-CM

## 2024-07-15 DIAGNOSIS — Z87.09 HISTORY OF COPD: ICD-10-CM

## 2024-07-15 DIAGNOSIS — I89.0 LYMPHEDEMA: ICD-10-CM

## 2024-07-15 DIAGNOSIS — I50.32 CHRONIC DIASTOLIC HEART FAILURE (H): ICD-10-CM

## 2024-07-15 DIAGNOSIS — R05.1 ACUTE COUGH: ICD-10-CM

## 2024-07-15 DIAGNOSIS — I10 PRIMARY HYPERTENSION: ICD-10-CM

## 2024-07-15 PROCEDURE — 99214 OFFICE O/P EST MOD 30 MIN: CPT | Mod: 95 | Performed by: INTERNAL MEDICINE

## 2024-07-15 NOTE — PROGRESS NOTES
Jaun is a 84 year old who is being evaluated via a billable video visit.    How would you like to obtain your AVS? MyChart  If the video visit is dropped, the invitation should be resent by: Text to cell phone: [phone]  Will anyone else be joining your video visit? No      Assessment & Plan   Problem List Items Addressed This Visit       HTN (hypertension)    Relevant Orders    Comprehensive metabolic panel (BMP + Alb, Alk Phos, ALT, AST, Total. Bili, TP)    CBC with platelets    Chronic diastolic heart failure (H)    Hypothyroidism    Relevant Orders    TSH with free T4 reflex    Lymphedema     Other Visit Diagnoses       Infection due to 2019 novel coronavirus    -  Primary    Relevant Orders    CBC with platelets    Acute cough        History of COPD                 Continue supportive treatment.  Continue antitussive humidifier nasal saline rinses nasal Flonase.  Keep on diuresis check electrolytes.  Keep active as possible.  Pulse ox monitoring.  He is not in distress currently and he is recovering smoothly from COVID infection.  He is completing Z-Deny course of 5 days.  History of COPD using Trelegy daily but has not needed to use the albuterol inhaler as needed.  Patient not in distress during the video visit and not cyanotic.  All questions answered     BMI  Estimated body mass index is 36.62 kg/m  as calculated from the following:    Height as of 4/25/24: 1.829 m (6').    Weight as of 6/19/24: 122.5 kg (270 lb).   Weight management plan: Discussed healthy diet and exercise guidelines    Work on weight loss  Regular exercise  See Patient Instructions      Subjective   Jaun is a 84 year old, presenting for the following health issues:  RECHECK        7/12/2024    10:37 AM   Additional Questions   Roomed by Justine Kern       Video Start Time:  10 AM    HPI     Patient presenting for follow-up.  Is feeling lipid better.  He completed course of Paxlovid and is finishing Zithromax.  His phlegm is still  clear now.  Denies any chest pain had slight fever on Saturday night 99.1.  No hemoptysis.  Swelling not the best but he is wearing his compression stockings.  He does not feel the infection went down into his lungs.  His pulse ox 93% on room air, blood pressure systolic in the 120s.  Is coughing more at night than during the day.  He does have humidifier in the room.  He is using Robitussin antitussive medication..  He is on metolazone twice daily and Lasix twice daily.  Denies any shortness of breath.  No other systemic complaints.  Last COVID-vaccine was in October 2023.  He tested for COVID on 7 4.    Review of Systems  Constitutional, HEENT, cardiovascular, pulmonary, gi and gu systems are negative, except as otherwise noted.      Objective           Vitals:  No vitals were obtained today due to virtual visit.    Physical Exam   GENERAL: alert and no distress  EYES: Eyes grossly normal to inspection.  No discharge or erythema, or obvious scleral/conjunctival abnormalities.  RESP: No audible wheeze, cough, or visible cyanosis.    SKIN: Visible skin clear. No significant rash, abnormal pigmentation or lesions.  NEURO: Cranial nerves grossly intact.  Mentation and speech appropriate for age.  PSYCH: Appropriate affect, tone, and pace of words    Ancillary Procedure on 06/12/2024   Component Date Value Ref Range Status    Date Time Interrogation Session 06/12/2024 [id number]   Final    Implantable Pulse Generator Manufa* 06/12/2024 DigitalAdvisor   Final    Implantable Pulse Generator Model 06/12/2024 [id number] ADVANTIO   Final    Implantable Pulse Generator Serial* 06/12/2024 [id number]   Final    Type Interrogation Session 06/12/2024 Remote Scheduled   Final    Clinic Name 06/12/2024 [facility]   Final    Implantable Pulse Generator Type 06/12/2024 Pacemaker   Final    Implantable Pulse Generator Implan* 06/12/2024 [id number]   Final    Implantable Lead  06/12/2024 Medtronic   Final    Implantable Lead  Model 06/12/2024 4076 CapsureFix Novus   Final    Implantable Lead Serial Number 06/12/2024 [id number]   Final    Implantable Lead Implant Date 06/12/2024 [id number]   Final    Implantable Lead Polarity Type 06/12/2024 Bipolar Lead   Final    Implantable Lead Location Detail 1 06/12/2024 UNKNOWN   Final    Implantable Lead Location 06/12/2024 Right Atrium   Final    Implantable Lead Connection Status 06/12/2024 Connected   Final    Implantable Lead  06/12/2024 Medtronic   Final    Implantable Lead Model 06/12/2024 4076 CapsureFix Novus   Final    Implantable Lead Serial Number 06/12/2024 [id number]   Final    Implantable Lead Implant Date 06/12/2024 [id number]   Final    Implantable Lead Polarity Type 06/12/2024 Bipolar Lead   Final    Implantable Lead Location Detail 1 06/12/2024 UNKNOWN   Final    Implantable Lead Location 06/12/2024 Right Ventricle   Final    Implantable Lead Connection Status 06/12/2024 Connected   Final    Philip Setting Mode (NBG Code) 06/12/2024 DDDR   Final    Philip Setting Lower Rate Limit 06/12/2024 60  [beats]/min Final    Philip Setting Maximum Tracking Rate 06/12/2024 130  [beats]/min Final    Philip Setting Maximum Sensor Rate 06/12/2024 130  [beats]/min Final    Philip Setting NAGI Delay Low 06/12/2024 250  ms Final    Philip Setting PAV Delay Low 06/12/2024 250  ms Final    Philip Setting PAV Delay High 06/12/2024 220  ms Final    Philip Setting NAGI Delay High 06/12/2024 220  ms Final    Philip Setting AT Mode Switch Rate 06/12/2024 150  [beats]/min Final    Philip Setting AT Mode Switch Mode 06/12/2024 VDIR   Final    Lead Channel Setting Sensing Polar* 06/12/2024 Bipolar   Final    Lead Channel Setting Sensing Sensi* 06/12/2024 0.5  mV Final    Lead Channel Setting Sensing Adapt* 06/12/2024 Fixed   Final    Lead Channel Setting Sensing Polar* 06/12/2024 Bipolar   Final    Lead Channel Setting Sensing Sensi* 06/12/2024 1.0  mV Final    Lead Channel Setting Sensing Adapt* 06/12/2024  Fixed   Final    Lead Channel Setting Pacing Polari* 06/12/2024 Bipolar   Final    Lead Channel Setting Pacing Pulse * 06/12/2024 0.4  ms Final    Lead Channel Setting Pacing Amplit* 06/12/2024 2.0  V Final    Lead Channel Setting Pacing Polari* 06/12/2024 Bipolar   Final    Lead Channel Setting Pacing Pulse * 06/12/2024 0.4  ms Final    Lead Channel Setting Pacing Amplit* 06/12/2024 3.2  V Final    Lead Channel Setting Pacing Captur* 06/12/2024 Fixed Pacing   Final    Zone Setting Type Category 06/12/2024 VT   Final    Zone Setting Vendor Type Category 06/12/2024 VT   Final    Zone Setting Status 06/12/2024 Monitor   Final    Zone Setting Detection Interval 06/12/2024 375  ms Final    Lead Channel Impedance Value 06/12/2024 441  ohm Final    Lead Channel Pacing Threshold Ampl* 06/12/2024 0.4  V Final    Lead Channel Pacing Threshold Puls* 06/12/2024 0.4  ms Final    Lead Channel Impedance Value 06/12/2024 405  ohm Final    Battery Date Time of Measurements 06/12/2024 20240612030100   Final    Battery Status 06/12/2024 Middle of Service   Final    Battery Remaining Longevity 06/12/2024 3  mo Final    Battery Remaining Percentage 06/12/2024 2  % Final    Philip Statistic Date Time Start 06/12/2024 20231127000000   Final    Philip Statistic Date Time End 06/12/2024 20240612000000   Final    Philip Statistic RA Percent Paced 06/12/2024 10  % Final    Philip Statistic RV Percent Paced 06/12/2024 0  % Final    Atrial Tachy Statistic Date Time S* 06/12/2024 [id number]   Final    Atrial Tachy Statistic Date Time E* 06/12/2024 20240611000000   Final    Atrial Tachy Statistic AT/AF Burde* 06/12/2024 1  % Final    Episode Statistic Recent Count 06/12/2024 18   Final    Episode Statistic Type Category 06/12/2024 VT   Final    Episode Statistic Vendor Type Sharlene* 06/12/2024 NSVT   Final    Episode Statistic Recent Count 06/12/2024 0   Final    Episode Statistic Type Category 06/12/2024 VT   Final    Episode Statistic Vendor Type  Sharlene* 06/12/2024 VT   Final    Episode Statistic Recent Date Time* 06/12/2024 20231127000000   Final    Episode Statistic Recent Date Time* 06/12/2024 20240612000000   Final    Episode Statistic Recent Date Time* 06/12/2024 20231127000000   Final    Episode Statistic Recent Date Time* 06/12/2024 20240612000000   Final    Episode Identifier 06/12/2024 [id number]   Final    Episode Type Category 06/12/2024 Periodic EGM   Final    Episode Date Time 06/12/2024 20240612015700   Final    Episode Identifier 06/12/2024 [id number]   Final    Episode Type Category 06/12/2024 VT   Final    Episode Date Time 06/12/2024 20240610205800   Final    Episode Duration 06/12/2024 8  s Final    Episode Type Induced Flag 06/12/2024 NO   Final    Episode Identifier 06/12/2024 [id number]   Final    Episode Type Category 06/12/2024 AT/AF   Final    Episode Date Time 06/12/2024 20240610205800   Final    Episode Duration 06/12/2024 13  s Final         Video-Visit Details    Type of service:  Video Visit   Video End Time: 10:16 AM  Originating Location (pt. Location): Home    Distant Location (provider location):  On-site  Platform used for Video Visit: Quintin  Signed Electronically by: Anne Heath MD

## 2024-07-19 ENCOUNTER — LAB (OUTPATIENT)
Dept: LAB | Facility: CLINIC | Age: 84
End: 2024-07-19
Payer: MEDICARE

## 2024-07-19 DIAGNOSIS — I10 PRIMARY HYPERTENSION: ICD-10-CM

## 2024-07-19 DIAGNOSIS — U07.1 INFECTION DUE TO 2019 NOVEL CORONAVIRUS: ICD-10-CM

## 2024-07-19 DIAGNOSIS — E03.9 HYPOTHYROIDISM, UNSPECIFIED TYPE: ICD-10-CM

## 2024-07-19 LAB
ALBUMIN SERPL BCG-MCNC: 4 G/DL (ref 3.5–5.2)
ALP SERPL-CCNC: 108 U/L (ref 40–150)
ALT SERPL W P-5'-P-CCNC: 15 U/L (ref 0–70)
ANION GAP SERPL CALCULATED.3IONS-SCNC: 10 MMOL/L (ref 7–15)
AST SERPL W P-5'-P-CCNC: 22 U/L (ref 0–45)
BILIRUB SERPL-MCNC: 0.5 MG/DL
BUN SERPL-MCNC: 19 MG/DL (ref 8–23)
CALCIUM SERPL-MCNC: 9.3 MG/DL (ref 8.8–10.4)
CHLORIDE SERPL-SCNC: 99 MMOL/L (ref 98–107)
CREAT SERPL-MCNC: 1.02 MG/DL (ref 0.67–1.17)
EGFRCR SERPLBLD CKD-EPI 2021: 72 ML/MIN/1.73M2
ERYTHROCYTE [DISTWIDTH] IN BLOOD BY AUTOMATED COUNT: 15 % (ref 10–15)
GLUCOSE SERPL-MCNC: 102 MG/DL (ref 70–99)
HCO3 SERPL-SCNC: 31 MMOL/L (ref 22–29)
HCT VFR BLD AUTO: 37.3 % (ref 40–53)
HGB BLD-MCNC: 11.9 G/DL (ref 13.3–17.7)
MCH RBC QN AUTO: 31.3 PG (ref 26.5–33)
MCHC RBC AUTO-ENTMCNC: 31.9 G/DL (ref 31.5–36.5)
MCV RBC AUTO: 98 FL (ref 78–100)
PLATELET # BLD AUTO: 206 10E3/UL (ref 150–450)
POTASSIUM SERPL-SCNC: 4.1 MMOL/L (ref 3.4–5.3)
PROT SERPL-MCNC: 7.1 G/DL (ref 6.4–8.3)
RBC # BLD AUTO: 3.8 10E6/UL (ref 4.4–5.9)
SODIUM SERPL-SCNC: 140 MMOL/L (ref 135–145)
TSH SERPL DL<=0.005 MIU/L-ACNC: 2.41 UIU/ML (ref 0.3–4.2)
WBC # BLD AUTO: 9.6 10E3/UL (ref 4–11)

## 2024-07-19 PROCEDURE — 36415 COLL VENOUS BLD VENIPUNCTURE: CPT

## 2024-07-19 PROCEDURE — 85027 COMPLETE CBC AUTOMATED: CPT

## 2024-07-19 PROCEDURE — 84443 ASSAY THYROID STIM HORMONE: CPT

## 2024-07-19 PROCEDURE — 80053 COMPREHEN METABOLIC PANEL: CPT

## 2024-07-19 RX ORDER — BENZONATATE 100 MG/1
100 CAPSULE ORAL 3 TIMES DAILY PRN
Qty: 30 CAPSULE | Refills: 0 | Status: SHIPPED | OUTPATIENT
Start: 2024-07-19 | End: 2024-08-09

## 2024-07-19 NOTE — PATIENT INSTRUCTIONS
Thank you for choosing us for your care. I have placed an order for a prescription so that you can start treatment. View your full visit summary for details by clicking on the link below. Your pharmacist will able to address any questions you may have about the medication.     If you're not feeling better within 5-7 days, please schedule an appointment.  You can schedule an appointment right here in [facility], or call [phone]  If the visit is for the same symptoms as your eVisit, we'll refund the cost of your eVisit if seen within seven days.

## 2024-07-24 ENCOUNTER — ANCILLARY PROCEDURE (OUTPATIENT)
Dept: CARDIOLOGY | Facility: CLINIC | Age: 84
End: 2024-07-24
Attending: INTERNAL MEDICINE
Payer: MEDICARE

## 2024-07-24 DIAGNOSIS — Z95.0 CARDIAC PACEMAKER IN SITU: ICD-10-CM

## 2024-07-24 DIAGNOSIS — I49.5 SICK SINUS SYNDROME (H): ICD-10-CM

## 2024-07-24 LAB
MDC_IDC_EPISODE_DTM: NORMAL
MDC_IDC_EPISODE_DTM: NORMAL
MDC_IDC_EPISODE_DURATION: 7 S
MDC_IDC_EPISODE_ID: NORMAL
MDC_IDC_EPISODE_ID: NORMAL
MDC_IDC_EPISODE_TYPE: NORMAL
MDC_IDC_EPISODE_TYPE: NORMAL
MDC_IDC_LEAD_CONNECTION_STATUS: NORMAL
MDC_IDC_LEAD_CONNECTION_STATUS: NORMAL
MDC_IDC_LEAD_IMPLANT_DT: NORMAL
MDC_IDC_LEAD_IMPLANT_DT: NORMAL
MDC_IDC_LEAD_LOCATION: NORMAL
MDC_IDC_LEAD_LOCATION: NORMAL
MDC_IDC_LEAD_LOCATION_DETAIL_1: NORMAL
MDC_IDC_LEAD_LOCATION_DETAIL_1: NORMAL
MDC_IDC_LEAD_MFG: NORMAL
MDC_IDC_LEAD_MFG: NORMAL
MDC_IDC_LEAD_MODEL: NORMAL
MDC_IDC_LEAD_MODEL: NORMAL
MDC_IDC_LEAD_POLARITY_TYPE: NORMAL
MDC_IDC_LEAD_POLARITY_TYPE: NORMAL
MDC_IDC_LEAD_SERIAL: NORMAL
MDC_IDC_LEAD_SERIAL: NORMAL
MDC_IDC_MSMT_BATTERY_DTM: NORMAL
MDC_IDC_MSMT_BATTERY_REMAINING_LONGEVITY: 3 MO
MDC_IDC_MSMT_BATTERY_REMAINING_PERCENTAGE: 3 %
MDC_IDC_MSMT_BATTERY_STATUS: NORMAL
MDC_IDC_MSMT_LEADCHNL_RA_IMPEDANCE_VALUE: 448 OHM
MDC_IDC_MSMT_LEADCHNL_RA_PACING_THRESHOLD_AMPLITUDE: 0.4 V
MDC_IDC_MSMT_LEADCHNL_RA_PACING_THRESHOLD_PULSEWIDTH: 0.4 MS
MDC_IDC_MSMT_LEADCHNL_RV_IMPEDANCE_VALUE: 391 OHM
MDC_IDC_PG_IMPLANT_DTM: NORMAL
MDC_IDC_PG_MFG: NORMAL
MDC_IDC_PG_MODEL: NORMAL
MDC_IDC_PG_SERIAL: NORMAL
MDC_IDC_PG_TYPE: NORMAL
MDC_IDC_SESS_CLINIC_NAME: NORMAL
MDC_IDC_SESS_DTM: NORMAL
MDC_IDC_SESS_TYPE: NORMAL
MDC_IDC_SET_BRADY_AT_MODE_SWITCH_MODE: NORMAL
MDC_IDC_SET_BRADY_AT_MODE_SWITCH_RATE: 150 {BEATS}/MIN
MDC_IDC_SET_BRADY_LOWRATE: 60 {BEATS}/MIN
MDC_IDC_SET_BRADY_MAX_SENSOR_RATE: 130 {BEATS}/MIN
MDC_IDC_SET_BRADY_MAX_TRACKING_RATE: 130 {BEATS}/MIN
MDC_IDC_SET_BRADY_MODE: NORMAL
MDC_IDC_SET_BRADY_PAV_DELAY_HIGH: 220 MS
MDC_IDC_SET_BRADY_PAV_DELAY_LOW: 250 MS
MDC_IDC_SET_BRADY_SAV_DELAY_HIGH: 220 MS
MDC_IDC_SET_BRADY_SAV_DELAY_LOW: 250 MS
MDC_IDC_SET_LEADCHNL_RA_PACING_AMPLITUDE: 2 V
MDC_IDC_SET_LEADCHNL_RA_PACING_POLARITY: NORMAL
MDC_IDC_SET_LEADCHNL_RA_PACING_PULSEWIDTH: 0.4 MS
MDC_IDC_SET_LEADCHNL_RA_SENSING_ADAPTATION_MODE: NORMAL
MDC_IDC_SET_LEADCHNL_RA_SENSING_POLARITY: NORMAL
MDC_IDC_SET_LEADCHNL_RA_SENSING_SENSITIVITY: 0.5 MV
MDC_IDC_SET_LEADCHNL_RV_PACING_AMPLITUDE: 3.2 V
MDC_IDC_SET_LEADCHNL_RV_PACING_CAPTURE_MODE: NORMAL
MDC_IDC_SET_LEADCHNL_RV_PACING_POLARITY: NORMAL
MDC_IDC_SET_LEADCHNL_RV_PACING_PULSEWIDTH: 0.4 MS
MDC_IDC_SET_LEADCHNL_RV_SENSING_ADAPTATION_MODE: NORMAL
MDC_IDC_SET_LEADCHNL_RV_SENSING_POLARITY: NORMAL
MDC_IDC_SET_LEADCHNL_RV_SENSING_SENSITIVITY: 1 MV
MDC_IDC_SET_ZONE_DETECTION_INTERVAL: 375 MS
MDC_IDC_SET_ZONE_STATUS: NORMAL
MDC_IDC_SET_ZONE_TYPE: NORMAL
MDC_IDC_SET_ZONE_VENDOR_TYPE: NORMAL
MDC_IDC_STAT_AT_BURDEN_PERCENT: 1 %
MDC_IDC_STAT_AT_DTM_END: NORMAL
MDC_IDC_STAT_AT_DTM_START: NORMAL
MDC_IDC_STAT_BRADY_DTM_END: NORMAL
MDC_IDC_STAT_BRADY_DTM_START: NORMAL
MDC_IDC_STAT_BRADY_RA_PERCENT_PACED: 9 %
MDC_IDC_STAT_BRADY_RV_PERCENT_PACED: 0 %
MDC_IDC_STAT_EPISODE_RECENT_COUNT: 0
MDC_IDC_STAT_EPISODE_RECENT_COUNT: 18
MDC_IDC_STAT_EPISODE_RECENT_COUNT_DTM_END: NORMAL
MDC_IDC_STAT_EPISODE_RECENT_COUNT_DTM_END: NORMAL
MDC_IDC_STAT_EPISODE_RECENT_COUNT_DTM_START: NORMAL
MDC_IDC_STAT_EPISODE_RECENT_COUNT_DTM_START: NORMAL
MDC_IDC_STAT_EPISODE_TYPE: NORMAL
MDC_IDC_STAT_EPISODE_TYPE: NORMAL
MDC_IDC_STAT_EPISODE_VENDOR_TYPE: NORMAL
MDC_IDC_STAT_EPISODE_VENDOR_TYPE: NORMAL

## 2024-07-24 PROCEDURE — 93296 REM INTERROG EVL PM/IDS: CPT | Performed by: INTERNAL MEDICINE

## 2024-07-24 PROCEDURE — 93294 REM INTERROG EVL PM/LDLS PM: CPT | Performed by: INTERNAL MEDICINE

## 2024-07-29 ENCOUNTER — APPOINTMENT (OUTPATIENT)
Dept: ULTRASOUND IMAGING | Facility: CLINIC | Age: 84
End: 2024-07-29
Attending: EMERGENCY MEDICINE
Payer: MEDICARE

## 2024-07-29 ENCOUNTER — HOSPITAL ENCOUNTER (EMERGENCY)
Facility: CLINIC | Age: 84
Discharge: HOME | End: 2024-07-29
Attending: EMERGENCY MEDICINE | Admitting: EMERGENCY MEDICINE
Payer: MEDICARE

## 2024-07-29 ENCOUNTER — NURSE TRIAGE (OUTPATIENT)
Dept: FAMILY MEDICINE | Facility: CLINIC | Age: 84
End: 2024-07-29
Payer: MEDICARE

## 2024-07-29 VITALS
DIASTOLIC BLOOD PRESSURE: 92 MMHG | TEMPERATURE: 98.2 F | HEART RATE: 86 BPM | SYSTOLIC BLOOD PRESSURE: 158 MMHG | OXYGEN SATURATION: 93 % | RESPIRATION RATE: 18 BRPM

## 2024-07-29 DIAGNOSIS — N39.0 URINARY TRACT INFECTION WITHOUT HEMATURIA, SITE UNSPECIFIED: ICD-10-CM

## 2024-07-29 DIAGNOSIS — B37.89 CANDIDA RASH OF GROIN: ICD-10-CM

## 2024-07-29 DIAGNOSIS — N45.1 RIGHT EPIDIDYMITIS: ICD-10-CM

## 2024-07-29 LAB
ALBUMIN UR-MCNC: 10 MG/DL
APPEARANCE UR: ABNORMAL
BILIRUB UR QL STRIP: NEGATIVE
COLOR UR AUTO: ABNORMAL
GLUCOSE UR STRIP-MCNC: NEGATIVE MG/DL
HGB UR QL STRIP: NEGATIVE
KETONES UR STRIP-MCNC: NEGATIVE MG/DL
LEUKOCYTE ESTERASE UR QL STRIP: ABNORMAL
NITRATE UR QL: NEGATIVE
PH UR STRIP: 7.5 [PH] (ref 5–7)
RBC URINE: 2 /HPF
SP GR UR STRIP: 1.01 (ref 1–1.03)
SPERM #/AREA URNS HPF: PRESENT /HPF
UROBILINOGEN UR STRIP-MCNC: NORMAL MG/DL
WBC CLUMPS #/AREA URNS HPF: PRESENT /HPF
WBC URINE: >182 /HPF

## 2024-07-29 PROCEDURE — 99284 EMERGENCY DEPT VISIT MOD MDM: CPT | Mod: 25

## 2024-07-29 PROCEDURE — 87086 URINE CULTURE/COLONY COUNT: CPT | Performed by: EMERGENCY MEDICINE

## 2024-07-29 PROCEDURE — 87186 SC STD MICRODIL/AGAR DIL: CPT | Performed by: EMERGENCY MEDICINE

## 2024-07-29 PROCEDURE — 76870 US EXAM SCROTUM: CPT

## 2024-07-29 PROCEDURE — 250N000013 HC RX MED GY IP 250 OP 250 PS 637: Performed by: EMERGENCY MEDICINE

## 2024-07-29 PROCEDURE — 93976 VASCULAR STUDY: CPT

## 2024-07-29 PROCEDURE — 81003 URINALYSIS AUTO W/O SCOPE: CPT | Performed by: EMERGENCY MEDICINE

## 2024-07-29 RX ORDER — LEVOFLOXACIN 500 MG/1
500 TABLET, FILM COATED ORAL ONCE
Status: COMPLETED | OUTPATIENT
Start: 2024-07-29 | End: 2024-07-29

## 2024-07-29 RX ORDER — ACETAMINOPHEN 500 MG
1000 TABLET ORAL ONCE
Status: COMPLETED | OUTPATIENT
Start: 2024-07-29 | End: 2024-07-29

## 2024-07-29 RX ORDER — CLOTRIMAZOLE 1 %
CREAM (GRAM) TOPICAL 2 TIMES DAILY
Qty: 30 G | Refills: 0 | Status: SHIPPED | OUTPATIENT
Start: 2024-07-29 | End: 2024-08-08

## 2024-07-29 RX ORDER — ACETAMINOPHEN 500 MG
1000 TABLET ORAL ONCE
Status: DISCONTINUED | OUTPATIENT
Start: 2024-07-29 | End: 2024-07-29 | Stop reason: ALTCHOICE

## 2024-07-29 RX ORDER — LEVOFLOXACIN 500 MG/1
500 TABLET, FILM COATED ORAL DAILY
Qty: 9 TABLET | Refills: 0 | Status: SHIPPED | OUTPATIENT
Start: 2024-07-29 | End: 2024-08-07

## 2024-07-29 RX ADMIN — LEVOFLOXACIN 500 MG: 500 TABLET, FILM COATED ORAL at 20:55

## 2024-07-29 RX ADMIN — ACETAMINOPHEN 1000 MG: 500 TABLET, FILM COATED ORAL at 18:36

## 2024-07-29 ASSESSMENT — ACTIVITIES OF DAILY LIVING (ADL)
ADLS_ACUITY_SCORE: 38

## 2024-07-29 NOTE — ED TRIAGE NOTES
Pt has had groin swelling on the left side of the groin and testicles, with difficulty urination. Unsure if there is bruising. Extremely tender on palpitation

## 2024-07-29 NOTE — TELEPHONE ENCOUNTER
"Pt stated he would go to ED ([facility]), per protocol    1. MECHANISM: \"How did the injury happen?\" (e.g., twisting injury, direct blow)       Right sided groin pain and difficulty urinating  2. ONSET: \"When did the injury happen?\" (Minutes or hours ago)       Friday 7/26  3. LOCATION: \"Where is the injury located?\"       Groin area  4. APPEARANCE of INJURY: \"What does the injury look like?\"  (e.g., looks normal; bruise, swelling)      General swelling and right side pain  5. PAIN: \"Is there pain?\" If Yes, ask: \"How bad is the pain?\"   \"What does it keep you from doing?\" (e.g., Scale 1-10; or mild, moderate, severe)    -  NONE: (0): No pain.    -  MILD (1-3): Doesn't interfere with normal activities.     -  MODERATE (4-7): Interferes with normal activities (e.g., work or school) or awakens from sleep, limping.     -  SEVERE (8-10): Excruciating pain, unable to do any normal activities, unable to walk.      3-4 laying down, 5-6 sitting, 7-8 ambulating  6. SIZE: For cuts, bruises, or swelling, ask: \"How large is it?\" (e.g., inches or centimeters;  entire joint)       Right sided swelling  7. TETANUS: For any breaks in the skin, ask: \"When was the last tetanus booster?\"        8. OTHER SYMPTOMS: \"Do you have any other symptoms?\"       Headache, most recent /80    Reason for Disposition   [1] MODERATE to SEVERE pain (e.g., interferes with normal activities, awakens from sleep, excruciating) AND [2] no known injury or muscle strain    Additional Information   Negative: [1] Major bleeding (e.g., actively dripping or spurting) AND [2] can't be stopped   Negative: Bullet wound, stabbed by knife, or other serious penetrating wound   Negative: Can't stand (bear weight) or walk   Negative: Sounds like a life-threatening emergency to the triager   Negative: Puncture wound of groin   Negative: Injury to the hip   Negative: Injury to the leg (e.g., thigh, back of upper leg)   Negative: Injury to the male genital area (e.g., " penis, scrotum)   Negative: Injury to the female genital area (e.g., labia, vagina, vulva)   Negative: Injury to the lower abdomen (above groin crease)   Negative: Wound looks infected   Negative: Pain in the male genital area (e.g., penis, scrotum)   Negative: Pain in the female genital area (e.g., labia, vagina, vulva)   Negative: Pain or swelling from known or suspected hernia   Negative: Skin is split open or gaping (or length > 1/2 inch or 12 mm)   Negative: [1] Bleeding AND [2] won't stop after 10 minutes of direct pressure (using correct technique)   Negative: [1] Dirt in the wound AND [2] not removed with 15 minutes of scrubbing   Negative: Sounds like a serious injury to the triager    Protocols used: Groin Injury and Strain-A-AH

## 2024-07-29 NOTE — ED PROVIDER NOTES
Emergency Department Note      History of Present Illness     Chief Complaint   Groin pain     HPI   Erich Craven is a 84 year old male, anticoagulated (Xarelto), with a history of diabetes mellitus, coronary artery disease, COPD, atrial fibrillation, and hypertension, presenting to the emergency department for evaluation of groin pain. The patient reports noticing swelling and pain on the left side of his groin beginning yesterday. He denies any recent trauma to his groin. He notes the pain radiates to his right groin but not flank or abdomen. He denies any changes in color in his groin, and he denies any hematuria or dysuria. He notes some recent difficulty urinating due to the pain/swelling making it difficult to move and appropriately position his penis.  He also indicates having recent headaches which he attributes to his hypertension. He denies any recent falls or trauma to his head, and he denies any abdominal distention or vomiting. He denies any new sexual partners and is not sexually active with anyone. He states that he last took 1000 mg of Tylenol at 1200 today.     Independent Historian   None    Review of External Notes   No    Past Medical History     Medical History and Problem List   Heart failure with preserved ejection fraction  Alcohol dependence in remission  Arthritis   BPH with urinary obstruction  CAD  Cardiac pacemaker  Chronic diastolic heart failure   COPD   Diabetes mellitus   DVT  Hypertension  Hyperlipidemia   Hypothyroidism  Lymphedema   Obesity  Paroxysmal atrial fibrillation   Pulmonary embolism     Medications   Aspirin   Lipitor  Tessalon   Cardizem CD/cartia XT  Trelegy ellipta  Lasix  Imdur  Levothyroxine   Zestril  Zaroxolyn  Zofran  Miralax  Wevovy   Aldactone   Flomax   Effexor   Xarelto     Surgical History   Cardiac stenting   Colonoscopy   Tonsillectomy   Eye surgery  Implant pacemaker  Inject nerve block suprascapular  Nerve block peripheral  Knee replacement  Reverse  arthroplasty shoulder  Vascular surgery     Physical Exam     Patient Vitals for the past 24 hrs:   BP Temp Temp src Pulse Resp SpO2   07/29/24 1833 (!) 157/86 98.2  F (36.8  C) Temporal 92 18 91 %     Physical Exam    Eyes:               Sclera white; Pupils are equal and round  ENT:                External ears and nares normal  Resp:               Non-labored, no retractions or accessory muscle use  GI:                   Abdomen is soft, non-tender, non-distended                          No rebound tenderness or peritoneal features  :                  R hemiscrotal tenderness and swelling, tenderness distal inguinal canal without obvious hernia, poor cremasteric reflexes bilaterally  MS:                  Moves all extremities  Skin:                Warm and dry, faint yeast rash R groin, no gangrene in groin  Neuro:             Speech is normal and fluent. No apparent deficit.  Ambulatory with cane.               Diagnostics     Lab Results   Labs Ordered and Resulted from Time of ED Arrival to Time of ED Departure   ROUTINE UA WITH MICROSCOPIC REFLEX TO CULTURE - Abnormal       Result Value    Color Urine Light Yellow      Appearance Urine Slightly Cloudy (*)     Glucose Urine Negative      Bilirubin Urine Negative      Ketones Urine Negative      Specific Gravity Urine 1.014      Blood Urine Negative      pH Urine 7.5 (*)     Protein Albumin Urine 10 (*)     Urobilinogen Urine Normal      Nitrite Urine Negative      Leukocyte Esterase Urine Large (*)     WBC Clumps Urine Present (*)     Sperm Urine Present (*)     RBC Urine 2      WBC Urine >182 (*)    URINE CULTURE       Imaging   US Testicular & Scrotum w Doppler Ltd   Final Result   IMPRESSION:   1.  Focal hyperemic edematous area inferior to the right testicle may be due to edema and inflammation in the right epididymal tail. Findings are suggestive of acute right epididymitis. Consider a follow-up ultrasound in 1-2 months.   2.  Small bilateral  hydroceles.        Independent Interpretation   None    ED Course      Medications Administered   Medications   levofloxacin (LEVAQUIN) tablet 500 mg (has no administration in time range)   acetaminophen (TYLENOL) tablet 1,000 mg (1,000 mg Oral $Given 7/29/24 1836)       Procedures   Procedures     Discussion of Management   None    ED Course   ED Course as of 07/29/24 2030 Mon Jul 29, 2024 1828 I obtained history and examined the patient as noted above.    2029 I reevaluated the patient, and spoke to him regarding his plan of care.        Optional/Additional Documentation  None    Medical Decision Making / Diagnosis     CMS Diagnoses: None    MIPS       None    MDM   Eirch Craven is a 84 year old male presenting to the emergency department for evaluation of groin pain. Discussed head CT given anticoagulation and new headaches and FHx brain tumor.  He declined preferring to focus on groin symptoms today.  Also denies head injury, vomiting, other neurologic symptoms to increase risk of intracranial finding.  Potential for torsion, epididymitis, orchitis, possible hernia though less favored on exam.  No symptoms of GI obstruction.  If DONA or hematuria may need CT to evaluate ureteral pathology as this can refer to the groin as well.    UA c/w UTI without hematuria.  US w/R sided epididymitis.  Not sexually active.  Culture pending.  Levofloxacin is the preferred antibiotic.  Discussed risks of hypoglycemic with his diabetes.  Will monitor.  Discussed risks of tendon rupture, no prednisone use since last fall and does not use it regularly.  Based on this discussion he would like to proceed with this antibiotic over alternatives.  First dose in ED.    At time these results came back, blood work had not been successfully drawn.  However no systemic symptoms to suggest sepsis and with these findings I have low suspicion for DONA.  He is comfortable starting antibiotics without having blood work in ED.  Follow up  with PCP if persistent symptoms or return immediately for any worsening or other concerns.    Hold iron supplements while on levofloxacin.    Disposition   The patient was discharged.     Diagnosis     ICD-10-CM    1. Right epididymitis  N45.1       2. Urinary tract infection without hematuria, site unspecified  N39.0            Discharge Medications   New Prescriptions    LEVOFLOXACIN (LEVAQUIN) 500 MG TABLET    Take 1 tablet (500 mg) by mouth daily for 9 days         Scribe Disclosure:  I, Micky Portillo, am serving as a scribe at 6:40 PM on 7/29/2024 to document services personally performed by She Ricardo MD based on my observations and the provider's statements to me.        She Ricardo MD  07/29/24 5158

## 2024-07-30 ENCOUNTER — MYC MEDICAL ADVICE (OUTPATIENT)
Dept: FAMILY MEDICINE | Facility: CLINIC | Age: 84
End: 2024-07-30

## 2024-07-30 ENCOUNTER — VIRTUAL VISIT (OUTPATIENT)
Dept: FAMILY MEDICINE | Facility: CLINIC | Age: 84
End: 2024-07-30
Payer: MEDICARE

## 2024-07-30 ENCOUNTER — PATIENT OUTREACH (OUTPATIENT)
Dept: FAMILY MEDICINE | Facility: CLINIC | Age: 84
End: 2024-07-30

## 2024-07-30 DIAGNOSIS — I89.0 LYMPHEDEMA: Primary | ICD-10-CM

## 2024-07-30 DIAGNOSIS — N45.1 EPIDIDYMITIS: Primary | ICD-10-CM

## 2024-07-30 PROCEDURE — 99213 OFFICE O/P EST LOW 20 MIN: CPT | Mod: 95 | Performed by: PHYSICIAN ASSISTANT

## 2024-07-30 RX ORDER — TRAMADOL HYDROCHLORIDE 50 MG/1
50 TABLET ORAL
Qty: 4 TABLET | Refills: 0 | Status: SHIPPED | OUTPATIENT
Start: 2024-07-30 | End: 2024-08-03

## 2024-07-30 NOTE — PATIENT INSTRUCTIONS
Use tramadol at bedtime only for 3-4 days, don't take it if you don't need it    Continue tylenol as needed for pain up to 1000mg three times daily, do not exceed 3000mg in 24 hour period    OTC desitin cream or aquaphor as needed for irritated skin on/around scrotum

## 2024-07-30 NOTE — PROGRESS NOTES
Jaun is a 84 year old who is being evaluated via a billable video visit.    How would you like to obtain your AVS? MyChart  If the video visit is dropped, the invitation should be resent by: Text to cell phone: [phone]  Will anyone else be joining your video visit? No      Assessment and Plan:     (N45.1) Epididymitis  (primary encounter diagnosis)  Comment: on levaquin with some improvement of pain, still having discomfort and difficulty sleeping, would like small rx for pain medication, he knows can be sedating and aware of need to use judiciously   Plan: traMADol (ULTRAM) 50 MG tablet--take at bedtime only  Continue tylenol, discussed max dose 3000mg in 24 hours  Ice as needed  Desitin or aquaphor to irritated skin on groin/scrotum  Sees pcp tomorrow       Alexia Holland PA-C      Subjective   Jaun is a 84 year old, presenting for the following health issues:  ER F/U (Patient is having a virtual visit to follow up after ER visit.)      Video Start Time: 4:59    History of Present Illness       Reason for visit:  Follow up from ER yesterdayHe consumes 2 sweetened beverage(s) daily.He exercises with enough effort to increase his heart rate 10 to 19 minutes per day.  He exercises with enough effort to increase his heart rate 4 days per week.   He is taking medications regularly.       ED/UC Followup:    Facility:  [facility] Emergency Dept  Date of visit: 7/29/2024  Reason for visit: Right epididymitis    Urinary tract infection without hematuria, site unspecified    Current Status: improved     Roberto Carlos Towey is a 84 year old male presenting to the emergency department for evaluation of groin pain. Discussed head CT given anticoagulation and new headaches and FHx brain tumor.  He declined preferring to focus on groin symptoms today.  Also denies head injury, vomiting, other neurologic symptoms to increase risk of intracranial finding.  Potential for torsion, epididymitis, orchitis,  possible hernia though less favored on exam.  No symptoms of GI obstruction.  If DONA or hematuria may need CT to evaluate ureteral pathology as this can refer to the groin as well.     UA c/w UTI without hematuria.  US w/R sided epididymitis.  Not sexually active.  Culture pending.  Levofloxacin is the preferred antibiotic.  Discussed risks of hypoglycemic with his diabetes.  Will monitor.  Discussed risks of tendon rupture, no prednisone use since last fall and does not use it regularly.  Based on this discussion he would like to proceed with this antibiotic over alternatives.  First dose in ED.     At time these results came back, blood work had not been successfully drawn.  However no systemic symptoms to suggest sepsis and with these findings I have low suspicion for DONA.  He is comfortable starting antibiotics without having blood work in ED.  Follow up with PCP if persistent symptoms or return immediately for any worsening or other concerns.     Hold iron supplements while on levofloxacin.    Jaun has been doing better at home  He has noticed less swelling of his scrotum since starting abx   He denies fever/chills  He does conitnues to have pain in scrotum and wonders about pain medication to help him sleep  He is currently taking tylenol 1000mg QID  Has some irritation of the skin around scrotum        Objective           Vitals:  No vitals were obtained today due to virtual visit.    Physical Exam   GENERAL: alert and no distress  EYES: Eyes grossly normal to inspection.  No discharge or erythema, or obvious scleral/conjunctival abnormalities.  RESP: No audible wheeze, cough, or visible cyanosis.    SKIN: Visible skin clear. No significant rash, abnormal pigmentation or lesions.  NEURO: Cranial nerves grossly intact.  Mentation and speech appropriate for age.  PSYCH: Appropriate affect, tone, and pace of words    Video-Visit Details    Type of service:  Video Visit   Video End Time:5:14 PM  Originating  Location (pt. Location): Home    Distant Location (provider location):  On-site  Platform used for Video Visit: Quintin  Signed Electronically by: Alexia Holland PA-C

## 2024-07-30 NOTE — TELEPHONE ENCOUNTER
Dr. Heath,    Spoke to pt post-ED.  Scheduled pt for visit with you tomorrow.  Pt states still in great amount of pain, especially when urinating, and asking for any other medication options for the next day or two while abx starting to work.    Pt currently taking tylenol 1000mg 4x daily, and cannot take nsaids.  Discussed that next option is most likely narcotic/may not be worth taking as abx should be working shortly.    Pt still requesting something more for pain if Dr. Heath willing to prescribe something short term.  Pharmacy pended if appropriate.    Martina Ackerman, RN  [facility] RN Triage Team            Transitions of Care Outreach  Chief Complaint   Patient presents with    Hospital F/U       Most Recent Admission Date: 7/29/2024   Most Recent Admission Diagnosis:      Most Recent Discharge Date: 7/29/2024   Most Recent Discharge Diagnosis: Right epididymitis - N45.1  Urinary tract infection without hematuria, site unspecified - N39.0  Candida rash of groin - B37.89     Transitions of Care Assessment    Discharge Assessment  How are you doing now that you are home?: pain/swelling down a bit, easier to urinate, but still painful to use the bathroom.  How are your symptoms? (Red Flag symptoms escalate to triage hotline per guidelines): Improved  Do you know how to contact your clinic care team if you have future questions or changes to your health status? : Yes  Does the patient have their discharge instructions? : Yes  Does the patient have questions regarding their discharge instructions? : Yes (see comment)  Were you started on any new medications or were there changes to any of your previous medications? : Yes (abx)  Does the patient have all of their medications?: No (see comment) (They will  at pharmacy)  Do you have questions regarding any of your medications? : No  Do you have all of your needed medical supplies or equipment (DME)?  (i.e. oxygen tank, CPAP, cane, etc.):  Yes    Follow up Plan     Discharge Follow-Up  Discharge follow up appointment scheduled in alignment with recommended follow up timeframe or Transitions of Risk Category? (Low = within 30 days; Moderate= within 14 days; High= within 7 days): Yes  Discharge Follow Up Appointment Date: 07/31/24  Discharge Follow Up Appointment Scheduled with?: Primary Care Provider    Future Appointments   Date Time Provider Department Center   7/31/2024  1:00 PM Anne Heath MD [facility]    8/29[facility]2024 12[facility]   11/11/2024  8:30 AM [facility]   11/18/2024  9:00 AM Tiffany Suero NP [facility]   12/27/2024 10:30 AM Anne Heath MD [facility]        Outpatient Plan as outlined on AVS reviewed with patient.    For any urgent concerns, please contact our 24 hour nurse triage line: [phone] ([phone])       Martina Ackerman RN

## 2024-07-30 NOTE — TELEPHONE ENCOUNTER
See other encounter for hospital follow up.  Martina Ackerman, RN  [facility] RN Triage Team

## 2024-07-30 NOTE — DISCHARGE INSTRUCTIONS
Do not take Iron supplements while on this antibiotic.  You can start them again when you finish the antibiotics.    This medication can decrease blood sugars in patients with diabetes.  Please check your blood sugar immediately if you are feeling weak, lightheaded, dizzy, or confused.

## 2024-07-30 NOTE — TELEPHONE ENCOUNTER
Would advise patient request to be addressed during his afternoon visit with the provider in the clinic.  I see patient has an appointment at 5 PM.  Dr Heath

## 2024-07-30 NOTE — TELEPHONE ENCOUNTER
Good day Dr. Heath,      Please review patient's MyChart message and advise. Patient has a video visit scheduled for leeanne Ray for ER follow up at 5 pm.    Merissa VILLARREAL MA on 7/30/2024 at 10:13 AM

## 2024-07-31 ENCOUNTER — OFFICE VISIT (OUTPATIENT)
Dept: FAMILY MEDICINE | Facility: CLINIC | Age: 84
End: 2024-07-31
Payer: COMMERCIAL

## 2024-07-31 VITALS
HEIGHT: 72 IN | BODY MASS INDEX: 36.87 KG/M2 | OXYGEN SATURATION: 94 % | DIASTOLIC BLOOD PRESSURE: 69 MMHG | HEART RATE: 70 BPM | WEIGHT: 272.2 LBS | TEMPERATURE: 98.4 F | RESPIRATION RATE: 18 BRPM | SYSTOLIC BLOOD PRESSURE: 120 MMHG

## 2024-07-31 DIAGNOSIS — N50.89 SCROTAL SWELLING: Primary | ICD-10-CM

## 2024-07-31 DIAGNOSIS — N50.82 SCROTAL PAIN: ICD-10-CM

## 2024-07-31 DIAGNOSIS — N45.1 ACUTE EPIDIDYMITIS: ICD-10-CM

## 2024-07-31 LAB
ALBUMIN UR-MCNC: NEGATIVE MG/DL
APPEARANCE UR: CLEAR
BACTERIA #/AREA URNS HPF: ABNORMAL /HPF
BILIRUB UR QL STRIP: NEGATIVE
COLOR UR AUTO: YELLOW
GLUCOSE UR STRIP-MCNC: NEGATIVE MG/DL
HGB UR QL STRIP: NEGATIVE
KETONES UR STRIP-MCNC: NEGATIVE MG/DL
LEUKOCYTE ESTERASE UR QL STRIP: ABNORMAL
NITRATE UR QL: NEGATIVE
PH UR STRIP: 8 [PH] (ref 5–7)
RBC #/AREA URNS AUTO: ABNORMAL /HPF
SP GR UR STRIP: 1.01 (ref 1–1.03)
SQUAMOUS #/AREA URNS AUTO: ABNORMAL /LPF
UROBILINOGEN UR STRIP-ACNC: 0.2 E.U./DL
WBC #/AREA URNS AUTO: ABNORMAL /HPF

## 2024-07-31 PROCEDURE — G2211 COMPLEX E/M VISIT ADD ON: HCPCS | Performed by: INTERNAL MEDICINE

## 2024-07-31 PROCEDURE — 99213 OFFICE O/P EST LOW 20 MIN: CPT | Performed by: INTERNAL MEDICINE

## 2024-07-31 PROCEDURE — 87086 URINE CULTURE/COLONY COUNT: CPT | Performed by: INTERNAL MEDICINE

## 2024-07-31 PROCEDURE — 81001 URINALYSIS AUTO W/SCOPE: CPT | Performed by: INTERNAL MEDICINE

## 2024-07-31 RX ORDER — NAPROXEN 500 MG/1
500 TABLET ORAL 2 TIMES DAILY WITH MEALS
Qty: 10 TABLET | Refills: 0 | Status: SHIPPED | OUTPATIENT
Start: 2024-07-31 | End: 2024-08-09

## 2024-07-31 ASSESSMENT — PAIN SCALES - GENERAL: PAINLEVEL: NO PAIN (0)

## 2024-07-31 NOTE — PROGRESS NOTES
Assessment & Plan   Problem List Items Addressed This Visit    None  Visit Diagnoses       Scrotal swelling    -  Primary    Relevant Medications    naproxen (NAPROSYN) 500 MG tablet    Other Relevant Orders    UA with Microscopic reflex to Culture - lab collect (Completed)    Urine Culture Aerobic Bacterial - lab collect    UA Microscopic with Reflex to Culture (Completed)    Acute epididymitis        Relevant Medications    naproxen (NAPROSYN) 500 MG tablet    Other Relevant Orders    UA with Microscopic reflex to Culture - lab collect (Completed)    Urine Culture Aerobic Bacterial - lab collect    UA Microscopic with Reflex to Culture (Completed)    Scrotal pain        Relevant Medications    naproxen (NAPROSYN) 500 MG tablet        Continue same antibiotics keep well-hydrated probiotics.  Naproxen twice daily as needed for the next 5 days for pain and swelling of the scrotal area.  Repeat urine studies including culture.  Culture showed Staph epidermidis probably contaminant.  Patient not sick looking.  Hemodynamics are stable.  All questions answered.       MED REC REQUIRED  Post Medication Reconciliation Status: discharge medications reconciled, continue medications without change  See Patient Instructions      Vernell Zamorano is a 84 year old, presenting for the following health issues:  ER F/U        7/31/2024    12:43 PM   Additional Questions   Roomed by Willie   Accompanied by Not applicable, by themselves     HPI       ED/UC Followup:    Facility:  [facility]  Date of visit: 7/29/2024 (3 hours)  Reason for visit: groin pain  Current Status: 2 DAYS ANTIBIOTICS, DOING BETTER    Patient presenting for follow-up he was diagnosed with acute epididymitis.  He is on Levaquin tolerating well so far.  Also is taking tramadol that helps with the pain.  Scrotal swelling and pain.  Denies any other systemic symptoms.  Denies any fever chills body aches or systemic symptoms.  Not  sexually active.    Review of Systems  Constitutional, HEENT, cardiovascular, pulmonary, gi and gu systems are negative, except as otherwise noted.      Objective    /69 (BP Location: Right arm, Cuff Size: Adult Large)   Pulse 70   Temp 98.4  F (36.9  C) (Temporal)   Resp 18   Ht 1.829 m (6')   Wt 123.5 kg (272 lb 3.2 oz)   SpO2 94%   BMI 36.92 kg/m    Body mass index is 36.92 kg/m .  Physical Exam   GENERAL: alert and no distress  EYES: Eyes grossly normal to inspection, PERRL and conjunctivae and sclerae normal  HENT: ear canals and TM's normal, nose and mouth without ulcers or lesions  NECK: no adenopathy, no asymmetry, masses, or scars  RESP: lungs clear to auscultation - no rales, rhonchi or wheezes  CV: regular rate and rhythm, normal S1 S2, no S3 or S4, no murmur, click or rub, no peripheral edema  ABDOMEN: soft, nontender, no hepatosplenomegaly, no masses and bowel sounds normal  MS: no gross musculoskeletal defects noted, no edema  SKIN: no suspicious lesions or rashes  NEURO: Normal strength and tone, mentation intact and speech normal  PSYCH: mentation appears normal, affect normal/bright    Admission on 07/29/2024, Discharged on 07/29/2024   Component Date Value Ref Range Status    Color Urine 07/29/2024 Light Yellow  Colorless, Straw, Light Yellow, Yellow Final    Appearance Urine 07/29/2024 Slightly Cloudy (A)  Clear Final    Glucose Urine 07/29/2024 Negative  Negative mg/dL Final    Bilirubin Urine 07/29/2024 Negative  Negative Final    Ketones Urine 07/29/2024 Negative  Negative mg/dL Final    Specific Gravity Urine 07/29/2024 1.014  1.003 - 1.035 Final    Blood Urine 07/29/2024 Negative  Negative Final    pH Urine 07/29/2024 7.5 (H)  5.0 - 7.0 Final    Protein Albumin Urine 07/29/2024 10 (A)  Negative mg/dL Final    Urobilinogen Urine 07/29/2024 Normal  Normal, 2.0 mg/dL Final    Nitrite Urine 07/29/2024 Negative  Negative Final    Leukocyte Esterase Urine 07/29/2024 Large (A)  Negative  Final    WBC Clumps Urine 07/29/2024 Present (A)  None Seen /HPF Final    Sperm Urine 07/29/2024 Present (A)  None Seen /HPF Final    RBC Urine 07/29/2024 2  <=2 /HPF Final    WBC Urine 07/29/2024 >182 (H)  <=5 /HPF Final    Culture 07/29/2024 Culture in progress   Preliminary    Culture 07/29/2024 >100,000 CFU/mL Staphylococcus epidermidis (A)   Preliminary           Signed Electronically by: Anne Heath MD

## 2024-08-01 LAB — BACTERIA UR CULT: NO GROWTH

## 2024-08-01 NOTE — TELEPHONE ENCOUNTER
Hi Dr. Heath--    This is the [only] previous order for Velcro Compression I was able to find~

## 2024-08-01 NOTE — TELEPHONE ENCOUNTER
Please can we check if there is a DME order for Velcro wraps.  [I cannot find under epic] has this order been placed before for the patient , under media tab?  Thank you for follow-up

## 2024-08-02 LAB
BACTERIA UR CULT: ABNORMAL
BACTERIA UR CULT: ABNORMAL

## 2024-08-02 NOTE — RESULT ENCOUNTER NOTE
Resulted after ED visit  Patient followed up with Dr Heath on 7/31  Routed final report to Dr Heath

## 2024-08-03 ENCOUNTER — MYC REFILL (OUTPATIENT)
Dept: FAMILY MEDICINE | Facility: CLINIC | Age: 84
End: 2024-08-03
Payer: MEDICARE

## 2024-08-03 DIAGNOSIS — E03.9 HYPOTHYROIDISM, UNSPECIFIED TYPE: ICD-10-CM

## 2024-08-05 ENCOUNTER — NURSE TRIAGE (OUTPATIENT)
Dept: NURSING | Facility: CLINIC | Age: 84
End: 2024-08-05
Payer: MEDICARE

## 2024-08-05 RX ORDER — TAMSULOSIN HYDROCHLORIDE 0.4 MG/1
0.4 CAPSULE ORAL DAILY
Qty: 90 CAPSULE | Refills: 0 | Status: SHIPPED | OUTPATIENT
Start: 2024-08-05

## 2024-08-05 RX ORDER — TAMSULOSIN HYDROCHLORIDE 0.4 MG/1
0.4 CAPSULE ORAL DAILY
Qty: 90 CAPSULE | Refills: 0 | OUTPATIENT
Start: 2024-08-05

## 2024-08-05 NOTE — TELEPHONE ENCOUNTER
PLEASE CAN WE HELP PATIENT WITH BELOW ORTHOTICS AND PROSTHETICS DME ORDER , I AM UNABLE TO FIND IN EPICS. SIMILAR TO PRIOR ORDER AND I WILL SIGN, NOT SURE WHO ORDERED PRIOR VELCRO COMPRESSION GARMENTS, 30-40 MMH , WILL KEEP SAME.    THANK YOU

## 2024-08-05 NOTE — TELEPHONE ENCOUNTER
"Nurse Triage SBAR    Is this a 2nd Level Triage? YES, LICENSED PRACTITIONER REVIEW IS REQUIRED    Situation: Patient calling with several concerns including low abdominal pain, headache.    Background: Patient reporting he was in ED last week and put on medication for UTI.  Writer notes the diagnosis was epididymitis.  Patient says symptoms are \"clearing up\" but having low abd pain.  PCP had given Naproxen and off of that now.      Assessment: Tylenol for pain and headaches, nausea, not eating.  Heart palpitations.  Heart clinic says they are \"not unusual.\"    /90, pulse up to 90.  Temp is  normal.    Abdominal pain down to 4-5/10 with Tylenol and headache almost zero.    Has more palpitations with activity.  Denies dizziness, dyspnea, chest pain, scrotal pain.    Protocol Recommended Disposition:   See HCP Within 4 Hours (Or PCP Triage)    Recommendation: Secondary triage.     Paged to provider  Paged on call Dr. Kim via smart web, who advised patient speak to his care team in the morning.  If symptoms worsen  then should be seen in ED tonight.    Provider Recommendation Follow Up:   Reached patient/caregiver. Informed of provider's recommendations. Patient verbalized understanding and agrees with the plan.     Lanny Garcia RN  [facility]      Does the patient meet one of the following criteria for ADS visit consideration? 16+ years old, with an [facility] PCP     TIP  Providers, please consider if this condition is appropriate for management at one of our Acute and Diagnostic Services sites.     If patient is a good candidate, please use dotphrase <dot>triageresponse and select Refer to ADS to document.      Reason for Disposition   [1] Skipped or extra beat(s) AND [2] increases with exercise or exertion   [1] MILD-MODERATE pain AND [2] constant AND [3] present > 2 hours    Additional Information   Negative: Shock suspected (e.g., cold/pale/clammy skin, too weak to stand, low BP, rapid " "pulse)   Negative: Difficult to awaken or acting confused (e.g., disoriented, slurred speech)   Negative: Passed out (i.e., lost consciousness, collapsed and was not responding)   Negative: Sounds like a life-threatening emergency to the triager   Negative: [1] SEVERE pain (e.g., excruciating) AND [2] present > 1 hour   Negative: [1] SEVERE pain AND [2] age > 60 years   Negative: [1] Vomiting AND [2] contains red blood or black (\"coffee ground\") material  (Exception: Few red streaks in vomit that only happened once.)   Negative: Blood in bowel movements  (Exception: Blood on surface of BM with constipation.)   Negative: Black or tarry bowel movements  (Exception: Chronic-unchanged black-grey BMs AND is taking iron pills or Pepto-Bismol.)   Negative: [1] Unable to urinate (or only a few drops) > 4 hours AND [2] bladder feels very full (e.g., palpable bladder or strong urge to urinate)   Negative: Difficult to awaken or acting confused (e.g., disoriented, slurred speech)   Negative: [1] Weakness of the face, arm or leg on one side of the body AND [2] new-onset   Negative: [1] Numbness of the face, arm or leg on one side of the body AND [2] new-onset   Negative: [1] Loss of speech or garbled speech AND [2] new-onset   Negative: Passed out (i.e., lost consciousness, collapsed and was not responding)   Negative: Sounds like a life-threatening emergency to the triager   Negative: Unable to walk, or can only walk with assistance (e.g., requires support)   Negative: Stiff neck (can't touch chin to chest)   Negative: Severe pain in one eye   Negative: [1] Other family members (or people in same household) with headaches AND [2] possibility of carbon monoxide exposure   Negative: [1] SEVERE headache (e.g., excruciating) AND [2] \"worst headache\" of life   Negative: [1] SEVERE headache AND [2] sudden-onset (i.e., reaching maximum intensity within seconds to 1 hour)   Negative: [1] SEVERE headache AND [2] fever   Negative: Loss " of vision or double vision  (Exception: Same as prior migraines.)   Negative: [1] Fever > 100.0 F (37.8 C) AND [2] diabetes mellitus or weak immune system (e.g., HIV positive, cancer chemo, splenectomy, organ transplant, chronic steroids)   Negative: Patient sounds very sick or weak to the triager   Negative: Difficult to awaken or acting confused (e.g., disoriented, slurred speech)   Negative: Severe difficulty breathing (e.g., struggling for each breath, speaks in single words)   Negative: [1] Weakness of the face, arm or leg on one side of the body AND [2] new onset   Negative: [1] Numbness (i.e., loss of sensation) of the face, arm or leg on one side of the body AND [2] new onset   Negative: [1] Chest pain lasts > 5 minutes AND [2] history of heart disease  (i.e., heart attack, bypass surgery, angina, angioplasty, CHF)   Negative: [1] Chest pain AND [2] took nitrogylcerin AND [3] pain was not relieved   Negative: Sounds like a life-threatening emergency to the triager   Negative: [1] Systolic BP  >= 160 OR Diastolic >= 100 AND [2] cardiac or neurologic symptoms (e.g., chest pain, difficulty breathing, unsteady gait, blurred vision)   Negative: [1] Pregnant > 20 weeks (or postpartum < 6 weeks) AND [2] new hand or face swelling   Negative: [1] Pregnant > 20 weeks AND [2] BP Systolic BP  >= 140 OR Diastolic >= 90   Negative: [1] Systolic BP  >= 200 OR Diastolic >= 120  AND [2] having NO cardiac or neurologic symptoms   Negative: [1] Postpartum < 6 weeks AND [2] BP Systolic BP  >= 140 OR Diastolic >= 90   Negative: [1] Systolic BP  >= 180 OR Diastolic >= 110 AND [2] missed most recent dose of blood pressure medication   Negative: Systolic BP  >= 180 OR Diastolic >= 110   Negative: Ran out of BP medications   Negative: Systolic BP  >= 160 OR Diastolic >= 100   Negative: [1] Taking BP medications AND [2] feels is having side effects (e.g., impotence, cough, dizzy upon standing)   Negative: [1] Systolic BP  >= 130 OR  Diastolic >= 80 AND [2] pregnant   Negative: Passed out (i.e., lost consciousness, collapsed and was not responding)   Negative: Shock suspected (e.g., cold/pale/clammy skin, too weak to stand, low BP, rapid pulse)   Negative: Difficult to awaken or acting confused (e.g., disoriented, slurred speech)   Negative: Visible sweat on face or sweat dripping down face   Negative: Unable to walk, or can only walk with assistance (e.g., requires support)   Negative: [1] Received SHOCK from implantable cardiac defibrillator AND [2] persisting symptoms (i.e., palpitations, lightheadedness)   Negative: [1] Dizziness, lightheadedness, or weakness AND [2] heart beating very rapidly (e.g., > 140 / minute)   Negative: [1] Dizziness, lightheadedness, or weakness AND [2] heart beating very slowly (e.g., < 50 / minute)   Negative: Sounds like a life-threatening emergency to the triager   Negative: Chest pain   Negative: Implantable Cardiac Defibrillator (ICD) or a pacemaker symptoms or questions   Negative: Difficulty breathing   Negative: Dizziness, lightheadedness, or weakness   Negative: [1] Heart beating very rapidly (e.g., > 140 / minute) AND [2] present now  (Exception: During exercise.)   Negative: Heart beating very slowly (e.g., < 50 / minute)  (Exception: Athlete and heart rate normal for caller.)   Negative: New or worsened shortness of breath with activity (dyspnea on exertion)   Negative: Patient sounds very sick or weak to the triager   Negative: [1] Heart beating very rapidly (e.g., > 140 / minute) AND [2] not present now  (Exception: During exercise.)   Negative: [1] Vomiting AND [2] contains bile (green color)   Negative: [1] Pain in the scrotum or testicle AND [2] present > 1 hour   Negative: Patient sounds very sick or weak to the triager    Protocols used: Abdominal Pain - Male-A-AH, Headache-A-AH, High Blood Pressure-A-AH, Heart Rate and Heartbeat [id number]

## 2024-08-06 ENCOUNTER — OFFICE VISIT (OUTPATIENT)
Dept: URGENT CARE | Facility: URGENT CARE | Age: 84
End: 2024-08-06
Payer: MEDICARE

## 2024-08-06 ENCOUNTER — NURSE TRIAGE (OUTPATIENT)
Dept: FAMILY MEDICINE | Facility: CLINIC | Age: 84
End: 2024-08-06
Payer: MEDICARE

## 2024-08-06 VITALS
DIASTOLIC BLOOD PRESSURE: 62 MMHG | TEMPERATURE: 98.5 F | WEIGHT: 267.9 LBS | BODY MASS INDEX: 36.33 KG/M2 | RESPIRATION RATE: 14 BRPM | SYSTOLIC BLOOD PRESSURE: 119 MMHG | HEART RATE: 74 BPM | OXYGEN SATURATION: 97 %

## 2024-08-06 DIAGNOSIS — E66.01 MORBID OBESITY (H): ICD-10-CM

## 2024-08-06 DIAGNOSIS — I50.32 CHRONIC DIASTOLIC (CONGESTIVE) HEART FAILURE (H): ICD-10-CM

## 2024-08-06 DIAGNOSIS — I89.0 LYMPHEDEMA OF LEFT LOWER EXTREMITY: Primary | ICD-10-CM

## 2024-08-06 DIAGNOSIS — E11.22 TYPE 2 DIABETES MELLITUS WITH STAGE 2 CHRONIC KIDNEY DISEASE, WITHOUT LONG-TERM CURRENT USE OF INSULIN (H): ICD-10-CM

## 2024-08-06 DIAGNOSIS — I82.4Z9 DEEP VEIN THROMBOSIS (DVT) OF DISTAL VEIN OF LOWER EXTREMITY, UNSPECIFIED CHRONICITY, UNSPECIFIED LATERALITY (H): ICD-10-CM

## 2024-08-06 DIAGNOSIS — R22.43 LOCALIZED SWELLING OF BOTH LOWER LEGS: ICD-10-CM

## 2024-08-06 DIAGNOSIS — Z86.718 PERSONAL HISTORY OF DVT (DEEP VEIN THROMBOSIS): ICD-10-CM

## 2024-08-06 DIAGNOSIS — N18.2 TYPE 2 DIABETES MELLITUS WITH STAGE 2 CHRONIC KIDNEY DISEASE, WITHOUT LONG-TERM CURRENT USE OF INSULIN (H): ICD-10-CM

## 2024-08-06 LAB
ANION GAP SERPL CALCULATED.3IONS-SCNC: 8 MMOL/L (ref 7–15)
BASOPHILS # BLD AUTO: 0 10E3/UL (ref 0–0.2)
BASOPHILS NFR BLD AUTO: 0 %
BUN SERPL-MCNC: 15.3 MG/DL (ref 8–23)
CALCIUM SERPL-MCNC: 10.1 MG/DL (ref 8.8–10.4)
CHLORIDE SERPL-SCNC: 97 MMOL/L (ref 98–107)
CREAT SERPL-MCNC: 1.11 MG/DL (ref 0.67–1.17)
EGFRCR SERPLBLD CKD-EPI 2021: 65 ML/MIN/1.73M2
EOSINOPHIL # BLD AUTO: 0.1 10E3/UL (ref 0–0.7)
EOSINOPHIL NFR BLD AUTO: 1 %
ERYTHROCYTE [DISTWIDTH] IN BLOOD BY AUTOMATED COUNT: 15.4 % (ref 10–15)
GLUCOSE SERPL-MCNC: 115 MG/DL (ref 70–99)
HCO3 SERPL-SCNC: 33 MMOL/L (ref 22–29)
HCT VFR BLD AUTO: 38.6 % (ref 40–53)
HGB BLD-MCNC: 12.5 G/DL (ref 13.3–17.7)
IMM GRANULOCYTES # BLD: 0 10E3/UL
IMM GRANULOCYTES NFR BLD: 0 %
LYMPHOCYTES # BLD AUTO: 1.8 10E3/UL (ref 0.8–5.3)
LYMPHOCYTES NFR BLD AUTO: 16 %
MCH RBC QN AUTO: 32 PG (ref 26.5–33)
MCHC RBC AUTO-ENTMCNC: 32.4 G/DL (ref 31.5–36.5)
MCV RBC AUTO: 99 FL (ref 78–100)
MONOCYTES # BLD AUTO: 1 10E3/UL (ref 0–1.3)
MONOCYTES NFR BLD AUTO: 9 %
NEUTROPHILS # BLD AUTO: 8.5 10E3/UL (ref 1.6–8.3)
NEUTROPHILS NFR BLD AUTO: 74 %
PLATELET # BLD AUTO: 187 10E3/UL (ref 150–450)
POTASSIUM SERPL-SCNC: 3.7 MMOL/L (ref 3.4–5.3)
RBC # BLD AUTO: 3.91 10E6/UL (ref 4.4–5.9)
SODIUM SERPL-SCNC: 138 MMOL/L (ref 135–145)
WBC # BLD AUTO: 11.5 10E3/UL (ref 4–11)

## 2024-08-06 PROCEDURE — 80048 BASIC METABOLIC PNL TOTAL CA: CPT | Performed by: NURSE PRACTITIONER

## 2024-08-06 PROCEDURE — 99215 OFFICE O/P EST HI 40 MIN: CPT | Performed by: NURSE PRACTITIONER

## 2024-08-06 PROCEDURE — 86140 C-REACTIVE PROTEIN: CPT | Performed by: INTERNAL MEDICINE

## 2024-08-06 PROCEDURE — 36415 COLL VENOUS BLD VENIPUNCTURE: CPT | Performed by: NURSE PRACTITIONER

## 2024-08-06 PROCEDURE — 85025 COMPLETE CBC W/AUTO DIFF WBC: CPT | Performed by: NURSE PRACTITIONER

## 2024-08-06 NOTE — TELEPHONE ENCOUNTER
"Pt reporting yellow shilo volor on leg that is \"leaking\" Advised pt to go to  today to be seen. Pt in agreement with plan.       Reason for Disposition   Patient wants to be seen    Additional Information   Negative: Sounds like a life-threatening emergency to the triager   Negative: Athlete's Foot suspected (i.e., itchy rash between the toes)   Negative: Insect bite(s) suspected   Negative: Jock Itch suspected (i.e., itchy rash on inner thighs near genital area)   Negative: Localized lump (or swelling) without redness or rash   Negative: MPOX SUSPECTED (e.g., direct skin contact such as sex, recent travel to [place] or [place]) and any SYMPTOMS OF MPOX (e.g., rash, fever, muscle aches, or swollen lymph nodes)   Negative: At risk for Mpox (men-who-have-sex-with-men) and possible exposure (e.g., multiple sex partners in past 21 days) and ANY SYMPTOMS OF MPOX (e.g., rash, fever, muscle aches, or swollen lymph nodes)   Negative: Poison ivy, oak, or sumac rash suspected (e.g., itchy rash after contact with poison ivy)   Negative: Rash of female genital area (e.g., labia, vagina, vulva)   Negative: Rash of male genital area (e.g., penis, scrotum)   Negative: Redness of immunization site   Negative: Shingles suspected (i.e., painful rash, multiple small blisters grouped together in one area of body; dermatomal distribution)   Negative: Small spot, skin growth, or mole   Negative: Wound infection suspected (i.e., pain, spreading redness, or pus; in a cut, puncture, scrape or sutured wound)   Negative: Fever and localized purple or blood-colored spots or dots that are not from injury or friction   Negative: Fever and localized rash is very painful   Negative: Patient sounds very sick or weak to the triager   Negative: Looks like a boil, infected sore, deep ulcer, or other infected rash (spreading redness, pus)   Negative: Painful rash with multiple small blisters grouped together (i.e., dermatomal distribution or 'band' " "or 'stripe')   Negative: Localized purple or blood-colored spots or dots that are not from injury or friction (no fever)   Negative: Localized rash is very painful (no fever)   Negative: Lyme disease suspected (e.g., bull's-eye rash or tick bite / exposure)    Answer Assessment - Initial Assessment Questions  1. APPEARANCE of RASH: \"Describe the rash.\"       Above ankle on L leg, yellow color 3-4 inches and 2 inches up \"leaking\"     2. LOCATION: \"Where is the rash located?\"       Above ankle L leg     3. NUMBER: \"How many spots are there?\"       One    4. SIZE: \"How big are the spots?\" (Inches, centimeters or compare to size of a coin)       3-4 inches by 2 inches     5. ONSET: \"When did the rash start?\"       \"Leaking this morning\"    6. ITCHING: \"Does the rash itch?\" If Yes, ask: \"How bad is the itch?\"  (Scale 0-10; or none, mild, moderate, severe)      No    7. PAIN: \"Does the rash hurt?\" If Yes, ask: \"How bad is the pain?\"  (Scale 0-10; or none, mild, moderate, severe)     - NONE (0): no pain     - MILD (1-3): doesn't interfere with normal activities      - MODERATE (4-7): interferes with normal activities or awakens from sleep      - SEVERE (8-10): excruciating pain, unable to do any normal activities      Yes Mild when having stocking on it     8. OTHER SYMPTOMS: \"Do you have any other symptoms?\" (e.g., fever)      No    9. PREGNANCY: \"Is there any chance you are pregnant?\" \"When was your last menstrual period?\"      NA    Protocols used: Rash or Redness - [id number]    "

## 2024-08-06 NOTE — TELEPHONE ENCOUNTER
Anne Heath MD  [facility]1 hour ago (10:18 AM)     KS  Thank you DME order signed, please notify patient.

## 2024-08-06 NOTE — PATIENT INSTRUCTIONS
Recommend wearing compression stockings to help with bilateral lower extremity edema  Elevate both legs above heart 3-4 times per day for approximately 10 to 20 minutes.    Close follow-up with your primary care provider within 1 week time.    How can you care for yourself at home?  Wear a compression stocking or sleeve as your doctor suggests. It can help keep fluid from pooling in an arm or leg. Wear it during air travel.  Prop up the swollen arm or leg on a pillow anytime you sit or lie down. Try to keep it above the level of your heart. This will help reduce swelling.  Avoid crossing your legs if they are swollen.  Get some exercise on most days of the week. Increase the intensity of exercise slowly. Wear your compression stocking or sleeve during exercise.  See a health professional, such as a physical therapist, who has been trained in lymphedema management. They can teach you how to do self-massage to help fluid move around. You also can learn what activities are best for you.  If you have had treatment to your underarm area:  When possible, avoid having blood drawn from that arm.  When possible, avoid having a blood pressure cuff placed on that arm. If you are in the hospital, make sure your nurse and other hospital staff know of your condition.  Avoid skin infection or injury.  Wear gloves when gardening or doing other activities that may lead to cuts on your fingers or hands.  Do not walk barefoot. Wear comfortable and supportive shoes that fit properly.  Use sunscreen and insect repellent when outdoors to protect your skin from sunburn and insect bites.  Ask your doctor how to treat any cuts, scratches, insect bites, or other injuries that may occur.  When should you call for help?   Call your doctor now or seek immediate medical care if:    You have signs of infection, such as:  Increased pain, swelling, warmth, or redness.  Red streaks leading from the area.  Pus draining from the area.  A fever.    Watch closely for changes in your health, and be sure to contact your doctor if:    You have new or worse symptoms from lymphedema.

## 2024-08-06 NOTE — PROGRESS NOTES
Assessment & Plan      Diagnosis Comments   1. Lymphedema of left lower extremity  Basic metabolic panel  (Ca, Cl, CO2, Creat, Gluc, K, Na, BUN), CBC with platelets and differential       2. Localized swelling of both lower legs  Basic metabolic panel  (Ca, Cl, CO2, Creat, Gluc, K, Na, BUN), CBC with platelets and differential       3. Morbid obesity (H)  Basic metabolic panel  (Ca, Cl, CO2, Creat, Gluc, K, Na, BUN), CBC with platelets and differential       4. Type 2 diabetes mellitus with stage 2 chronic kidney disease, without long-term current use of insulin (H)  Basic metabolic panel  (Ca, Cl, CO2, Creat, Gluc, K, Na, BUN), CBC with platelets and differential       5. Chronic diastolic (congestive) heart failure (H)        6. Deep vein thrombosis (DVT) of distal vein of lower extremity, unspecified chronicity, unspecified laterality (H)        7. Personal history of DVT (deep vein thrombosis)          Mild leukocytosis on CBC noted patient is taking levofloxacin we will continue this course.  Recommend elevation bilateral lower extremities 3-4 times per day for 10 to 20 minutes along with compression stockings if unable to wear these would recommend Ace wraps for compression with close follow-up with his primary care provider BMP is pending.  Patient is stable upon discharge we discussed red flag symptoms that would warrant emergent or urgent evaluation patient verbalized understanding was agreed with this plan  45 minutes spent on the date of the encounter doing chart review, review of outside records, review of test results, interpretation of tests, patient visit, documentation, and discussion with other provider(s)         VAMSI Sheth [facility] HERNAN Zamorano is a 84 year old male who presents to clinic today for the following health issues:  Chief Complaint   Patient presents with    Urgent Care     Broken skin on left leg, lower limb. Around ankle.        HPI    Patient presents to clinic with symptoms of left lower extremity swelling and drainage.  She has a history of obesity, diabetes, DVT, atrial fibs, congestive heart failure was recently hospitalized for epididymitis is treating with levofloxacin states he has 3 days left of this antibiotic.  Is complaining of some mild nausea GI upset denies vomiting or diarrhea.  States he does wear compression stockings typically however has been causing him pain therefore he discontinued this.  He states he recently changed some of his renal medications that he normally takes.    Currently is denying any chest pain, shortness of breath, pain in lower extremities or pain.  Denies fever or malaise.    Current Outpatient Medications   Medication Sig Dispense Refill    acetaminophen (TYLENOL) 500 MG tablet Take 1,000 mg by mouth 3 times daily      aspirin (ASA) 81 MG chewable tablet Take 1 tablet by mouth daily      atorvastatin (LIPITOR) 80 MG tablet Take 1 tablet (80 mg) by mouth every evening 90 tablet 4    clotrimazole (LOTRIMIN) 1 % external cream Apply topically 2 times daily for 10 days Bilateral groin folds for yeast infection of skin 30 g 0    diltiazem ER COATED BEADS (CARDIZEM CD/CARTIA XT) 300 MG 24 hr capsule Take 1 capsule (300 mg) by mouth daily 90 capsule 3    ferrous sulfate (SLOW FE) 142 (45 Fe) MG CR tablet TAKE ONE TABLET BY MOUTH EVERY OTHER DAY 45 tablet 1    Fluticasone-Umeclidin-Vilanterol (TRELEGY ELLIPTA) 200-62.5-25 MCG/INH oral inhaler Inhale 1 puff into the lungs daily      furosemide (LASIX) 40 MG tablet Take 0.5 tablets (20 mg) by mouth 2 times daily      isosorbide mononitrate (IMDUR) 30 MG 24 hr tablet Take 1 tablet (30 mg) by mouth daily 90 tablet 3    levofloxacin (LEVAQUIN) 500 MG tablet Take 1 tablet (500 mg) by mouth daily for 9 days 9 tablet 0    levothyroxine (SYNTHROID/LEVOTHROID) 175 MCG tablet Take 1 tablet (175 mcg) by mouth daily 90 tablet 3    lisinopril (ZESTRIL) 20 MG tablet  Take 1 tablet (20 mg) by mouth daily 90 tablet 3    metolazone (ZAROXOLYN) 2.5 MG tablet Take 1 tablet (2.5 mg) by mouth 2 times daily 180 tablet 0    Multiple vitamin TABS Take 1 tablet by mouth daily      potassium chloride cammy ER (KLOR-CON M10) 10 MEQ CR tablet Take 1 tablet (10 mEq) by mouth 2 times daily 180 tablet 1    spironolactone (ALDACTONE) 25 MG tablet TAKE 1 TABLET(25 MG) BY MOUTH MWFSat 90 tablet 3    tamsulosin (FLOMAX) 0.4 MG capsule Take 1 capsule (0.4 mg) by mouth daily 90 capsule 0    venlafaxine (EFFEXOR) 75 MG tablet TAKE 1 TABLET(75 MG) BY MOUTH TWICE DAILY 180 tablet 0    XARELTO ANTICOAGULANT 20 MG TABS tablet TAKE 1 TABLET(20 MG) BY MOUTH DAILY WITH DINNER 30 tablet 11    benzonatate (TESSALON) 100 MG capsule Take 1 capsule (100 mg) by mouth 3 times daily as needed for cough 30 capsule 0    benzonatate (TESSALON) 100 MG capsule Take 1 capsule (100 mg) by mouth 3 times daily as needed for cough 30 capsule 0    naproxen (NAPROSYN) 500 MG tablet Take 1 tablet (500 mg) by mouth 2 times daily (with meals) 10 tablet 0    ondansetron (ZOFRAN) 4 MG tablet Take 1 tablet (4 mg) by mouth every 8 hours as needed for nausea 30 tablet 5    polyethylene glycol (MIRALAX) 17 g packet Take 17 g by mouth daily (Patient taking differently: Take 1 packet by mouth daily as needed) 7 packet 0     No current facility-administered medications for this visit.     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 Started in 2012    Hypothyroidism     Lymphedema     Mumps     Obesity, Class III, BMI 40-49.9 (morbid obesity) (H)     RIN (obstructive sleep apnea)     has refused CPAP    Pacemaker     Paroxysmal A-fib (H)     PE (pulmonary thromboembolism) (H)     Status post coronary angiogram 11/05/2019           Review of Systems  Constitutional, HEENT, cardiovascular, pulmonary, gi and gu systems are negative, except as otherwise noted.      Objective    /62   Pulse 74   Temp 98.5  F (36.9  C) (Tympanic)   Resp 14   Wt 121.5 kg (267 lb 14.4 oz)   SpO2 97%   BMI 36.33 kg/m    Physical Exam   GENERAL: alert and no distress  EYES: Eyes grossly normal to inspection, PERRL and conjunctivae and sclerae normal  HENT: ear canals and TM's normal, nose and mouth without ulcers or lesions  NECK: no adenopathy, no asymmetry, masses, or scars  RESP: lungs clear to auscultation - no rales, rhonchi or wheezes  CV: regular rate and rhythm, normal S1 S2, no S3 or S4, no murmur, click or rub, no peripheral edema  ABDOMEN: soft, nontender, no hepatosplenomegaly, no masses and bowel sounds normal  MS: Bilateral lower extremity 1+ pitting edema left greater than right.  CMS intact bilaterally normal pulses bilaterally left lower extremity exudate, weepy edema  SKIN: Bilateral lower extremities ready appearance of skin left lower extremity serous drainage noted on anterior shin negative for warmth or fluctuance.  Negative for pain.  CMS intact normal pulses.  Soft loose Curlex was applied in clinic with instructions for patient to use compression stockings at home and/or Ace wraps.  BACK: no CVA tenderness, no paralumbar tenderness  PSYCH: mentation appears normal, affect normal/bright  LYMPH: no cervical, supraclavicular, axillary, or inguinal adenopathy  Results for orders placed or performed in visit on 08/06/24   CBC with platelets and  differential     Status: Abnormal   Result Value Ref Range    WBC Count 11.5 (H) 4.0 - 11.0 10e3/uL    RBC Count 3.91 (L) 4.40 - 5.90 10e6/uL    Hemoglobin 12.5 (L) 13.3 - 17.7 g/dL    Hematocrit 38.6 (L) 40.0 - 53.0 %    MCV 99 78 - 100 fL    MCH 32.0 26.5 - 33.0 pg    MCHC 32.4 31.5 - 36.5 g/dL    RDW 15.4 (H) 10.0 - 15.0 %    Platelet Count 187 150 - 450 10e3/uL    % Neutrophils 74 %    % Lymphocytes 16 %    % Monocytes 9 %    % Eosinophils 1 %    % Basophils 0 %    % Immature Granulocytes 0 %    Absolute Neutrophils 8.5 (H) 1.6 - 8.3 10e3/uL    Absolute Lymphocytes 1.8 0.8 - 5.3 10e3/uL    Absolute Monocytes 1.0 0.0 - 1.3 10e3/uL    Absolute Eosinophils 0.1 0.0 - 0.7 10e3/uL    Absolute Basophils 0.0 0.0 - 0.2 10e3/uL    Absolute Immature Granulocytes 0.0 <=0.4 10e3/uL   CBC with platelets and differential     Status: Abnormal    Narrative    The following orders were created for panel order CBC with platelets and differential.  Procedure                               Abnormality         Status                     ---------                               -----------         ------                     CBC with platelets and d...[[id number]]  Abnormal            Final result                 Please view results for these tests on the individual orders.

## 2024-08-07 NOTE — TELEPHONE ENCOUNTER
Sent message to clarify where patient would like DME order sent.    Tamiko PRETTY  [facility]

## 2024-08-08 NOTE — TELEPHONE ENCOUNTER
Please see [facility] message and advise.      Pt is also asking for HC to assist with wraps and ensure skin break is healed.

## 2024-08-09 ENCOUNTER — OFFICE VISIT (OUTPATIENT)
Dept: FAMILY MEDICINE | Facility: CLINIC | Age: 84
End: 2024-08-09
Payer: MEDICARE

## 2024-08-09 VITALS
BODY MASS INDEX: 35.46 KG/M2 | RESPIRATION RATE: 20 BRPM | WEIGHT: 261.8 LBS | HEART RATE: 85 BPM | TEMPERATURE: 97.2 F | DIASTOLIC BLOOD PRESSURE: 67 MMHG | SYSTOLIC BLOOD PRESSURE: 104 MMHG | OXYGEN SATURATION: 95 % | HEIGHT: 72 IN

## 2024-08-09 DIAGNOSIS — I48.0 PAROXYSMAL A-FIB (H): Primary | ICD-10-CM

## 2024-08-09 DIAGNOSIS — E03.9 HYPOTHYROIDISM, UNSPECIFIED TYPE: ICD-10-CM

## 2024-08-09 DIAGNOSIS — J42 CHRONIC BRONCHITIS, UNSPECIFIED CHRONIC BRONCHITIS TYPE (H): ICD-10-CM

## 2024-08-09 DIAGNOSIS — Z95.5 HISTORY OF CORONARY ARTERY STENT PLACEMENT: ICD-10-CM

## 2024-08-09 DIAGNOSIS — I10 PRIMARY HYPERTENSION: ICD-10-CM

## 2024-08-09 DIAGNOSIS — Z95.0 CARDIAC PACEMAKER IN SITU: ICD-10-CM

## 2024-08-09 DIAGNOSIS — I50.32 CHRONIC HEART FAILURE WITH PRESERVED EJECTION FRACTION (H): ICD-10-CM

## 2024-08-09 DIAGNOSIS — I50.32 CHRONIC DIASTOLIC (CONGESTIVE) HEART FAILURE (H): ICD-10-CM

## 2024-08-09 DIAGNOSIS — D72.829 LEUKOCYTOSIS, UNSPECIFIED TYPE: ICD-10-CM

## 2024-08-09 DIAGNOSIS — G47.33 OSA (OBSTRUCTIVE SLEEP APNEA): ICD-10-CM

## 2024-08-09 DIAGNOSIS — I89.0 LYMPHEDEMA: ICD-10-CM

## 2024-08-09 LAB — CRP SERPL-MCNC: 31.38 MG/L

## 2024-08-09 PROCEDURE — 99214 OFFICE O/P EST MOD 30 MIN: CPT | Performed by: INTERNAL MEDICINE

## 2024-08-09 ASSESSMENT — PAIN SCALES - GENERAL: PAINLEVEL: NO PAIN (0)

## 2024-08-09 NOTE — PROGRESS NOTES
Assessment & Plan   Problem List Items Addressed This Visit       HTN (hypertension)    RIN (obstructive sleep apnea)    COPD (chronic obstructive pulmonary disease) (H)    Hypothyroidism    Paroxysmal A-fib (H) - Primary    Lymphedema    (HFpEF) heart failure with preserved ejection fraction (H)    Cardiac pacemaker in situ    History of coronary artery stent placement    Chronic diastolic (congestive) heart failure (H)     Other Visit Diagnoses       Leukocytosis, unspecified type        Relevant Orders    CRP, inflammation             Change of dressing done.  There is slightly denuded skin applied nonadhesive gauze and medicated gauze on the area.  No signs of cellulitis increased swelling of the foot and left lower extremity.  Continues compression stockings.  And compression wraps.  He has home health coming visiting him to do that.  If any worsening symptoms will consider antibiotics again he just finished Levaquin.  But that does not have good coverage for strep or staph.  He does have some dizziness is on 3 diuretics advised to cut down metolazone and Lasix by half if he feels dizzy also there is room to cut down on lisinopril by half.  Not sure if he really needs isosorbide mononitrate as can be contributing to his lymphedema.  He will follow-up with cardiology on such.  He has no chest symptoms.  He had some palpitations with the acute epididymitis that has abated.  Has loose stools but no diarrhea.  He has some nausea but is getting better.  Will continue closely follow him as outpatient has slight increase of his white count without much left shift but he does have leukocytosis on and off in the past.  He has no chills or fever.  I added CRP.  Will continue closely monitor he is on potassium supplements twice a day.  All questions answered.  Compliant with his inhaler Katie uses a BiPAP with oxygen at night.  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  All questions  answered   MED REC REQUIRED  Post Medication Reconciliation Status: discharge medications reconciled, continue medications without change  Work on weight loss  Regular exercise  See Patient Instructions      Subjective   Jaun is a 84 year old, presenting for the following health issues:  ER F/U (Patient is here for an Urgent Care follow up. )        8/9/2024     8:40 AM   Additional Questions   Roomed by Inderjit   Accompanied by TARUN REINOSO     Jaun presented for follow-up.  Has lost some weight.  Has decreased appetite and has some ongoing nausea.  He completed a course of levofloxacin due to acute epididymitis.  He was seen in the urgent care for leg swelling and oozing from his leg.  He was advised to apply compression wraps.  He has home health problems today to be doing the wraps for him.  Has compression stockings he put on the right lower extremity but not the left.  Some leakage from the left distal shin area.  No fever or chills.  No diarrhea but stools are looser..  He wants to find out if the leg is still leaking.  Hoping that the nausea will self-correct.  Lost around 10 pounds.  He feels slightly dizzy.  Palpitations have stopped.  Last echocardiogram was in February 2024 showed his ejection fraction 55 to 60%, ascending aorta 4 cm, right ventricle is normal.  He is on metolazone 1 tablet twice a day, he was not using much at the beginning of the urinary tract infection.  And he is on Lasix 40 mg written as half tablet twice a day, it seems patient has been taking 1 tablet twice a day..  He is not having any chest pain.  He is using the inhaler daily Trelegy.  He is on Effexor twice a day for anxiety.  No urinary symptoms or dysuria or but does still have frequent urination pain and swelling in the right scrotal areas decreased.  Not putting any Vaseline the lower extremities..    Review of Systems  Constitutional, HEENT, cardiovascular, pulmonary, gi and gu systems are negative, except as otherwise  noted.      Objective    /67 (BP Location: Right arm, Patient Position: Sitting, Cuff Size: Adult Large)   Pulse 85   Temp 97.2  F (36.2  C) (Temporal)   Resp 20   Ht 1.829 m (6')   Wt 118.8 kg (261 lb 12.8 oz)   SpO2 95%   BMI 35.51 kg/m    Body mass index is 35.51 kg/m .  Physical Exam   GENERAL: alert and no distress  EYES: Eyes grossly normal to inspection, PERRL and conjunctivae and sclerae normal  HENT: ear canals and TM's normal, nose and mouth without ulcers or lesions  NECK: no adenopathy, no asymmetry, masses, or scars  RESP: lungs clear to auscultation - no rales, rhonchi or wheezes  CV: regular rate and rhythm, normal S1 S2, no S3 or S4, no murmur, click or rub, no peripheral edema  ABDOMEN: soft, nontender, no hepatosplenomegaly, no masses and bowel sounds normal  MS: no gross musculoskeletal defects noted, no edema  SKIN: no suspicious lesions or rashes  NEURO: Normal strength and tone, mentation intact and speech normal  PSYCH: mentation appears normal, affect normal/bright    Office Visit on 08/06/2024   Component Date Value Ref Range Status    Sodium 08/06/2024 138  135 - 145 mmol/L Final    Potassium 08/06/2024 3.7  3.4 - 5.3 mmol/L Final    Chloride 08/06/2024 97 (L)  98 - 107 mmol/L Final    Carbon Dioxide (CO2) 08/06/2024 33 (H)  22 - 29 mmol/L Final    Anion Gap 08/06/2024 8  7 - 15 mmol/L Final    Urea Nitrogen 08/06/2024 15.3  8.0 - 23.0 mg/dL Final    Creatinine 08/06/2024 1.11  0.67 - 1.17 mg/dL Final    GFR Estimate 08/06/2024 65  >60 mL/min/1.73m2 Final    eGFR calculated using 2021 CKD-EPI equation.    Calcium 08/06/2024 10.1  8.8 - 10.4 mg/dL Final    Reference intervals for this test were updated on 7/16/2024 to reflect our healthy population more accurately. There may be differences in the flagging of prior results with similar values performed with this method. Those prior results can be interpreted in the context of the updated reference intervals.    Glucose 08/06/2024  115 (H)  70 - 99 mg/dL Final    WBC Count 08/06/2024 11.5 (H)  4.0 - 11.0 10e3/uL Final    RBC Count 08/06/2024 3.91 (L)  4.40 - 5.90 10e6/uL Final    Hemoglobin 08/06/2024 12.5 (L)  13.3 - 17.7 g/dL Final    Hematocrit 08/06/2024 38.6 (L)  40.0 - 53.0 % Final    MCV 08/06/2024 99  78 - 100 fL Final    MCH 08/06/2024 32.0  26.5 - 33.0 pg Final    MCHC 08/06/2024 32.4  31.5 - 36.5 g/dL Final    RDW 08/06/2024 15.4 (H)  10.0 - 15.0 % Final    Platelet Count 08/06/2024 187  150 - 450 10e3/uL Final    % Neutrophils 08/06/2024 74  % Final    % Lymphocytes 08/06/2024 16  % Final    % Monocytes 08/06/2024 9  % Final    % Eosinophils 08/06/2024 1  % Final    % Basophils 08/06/2024 0  % Final    % Immature Granulocytes 08/06/2024 0  % Final    Absolute Neutrophils 08/06/2024 8.5 (H)  1.6 - 8.3 10e3/uL Final    Absolute Lymphocytes 08/06/2024 1.8  0.8 - 5.3 10e3/uL Final    Absolute Monocytes 08/06/2024 1.0  0.0 - 1.3 10e3/uL Final    Absolute Eosinophils 08/06/2024 0.1  0.0 - 0.7 10e3/uL Final    Absolute Basophils 08/06/2024 0.0  0.0 - 0.2 10e3/uL Final    Absolute Immature Granulocytes 08/06/2024 0.0  <=0.4 10e3/uL Final           Signed Electronically by: Anne Heath MD

## 2024-08-12 NOTE — TELEPHONE ENCOUNTER
Real Zamorano, are you requesting home health care.    I suggested taking half the dose of metolazone and Lasix.  Spironolactone is the third diuretic.    The isosorbide mononitrate can cause leg swelling but this should be adjusted by the heart specialist if you need to take every other day could be a possibility depending on your blood pressure that may be causing some dizziness as well.    Regards,  Dr Heath

## 2024-08-14 NOTE — TELEPHONE ENCOUNTER
Dr. Heath,    See Mychart message from the patient regarding needing a new order for a cane. Order pended for review.     Thank you,  Kristal Gamez RN

## 2024-08-20 ENCOUNTER — MYC REFILL (OUTPATIENT)
Dept: FAMILY MEDICINE | Facility: CLINIC | Age: 84
End: 2024-08-20
Payer: MEDICARE

## 2024-08-20 DIAGNOSIS — I50.32 CHRONIC DIASTOLIC HEART FAILURE (H): ICD-10-CM

## 2024-08-20 DIAGNOSIS — Z45.010 PACEMAKER AT END OF BATTERY LIFE: Primary | ICD-10-CM

## 2024-08-20 NOTE — PROGRESS NOTES
Heart Care Device Change-Out Checklist (YANNICK Checklist)    Device Data  Pacemaker and Dual    :  [employer]  Model:  [employer] K063 ADVANTIO  Serial Number:  [id number]  Implant location: Left Chest    Implant Date: 2/20/2015  YANNICK Date:  8/19/24  Device Diagnosis:  SSS and afib     Device Alert(s):  No    Lead Data   Last Interrogation Date: 11/27/23    Atrium: Medtronic 4076 CapsureFix [id number]   Lead Imp:  429 Ohms, Pacing Threshold:  0.5V@0.4ms, and Sensing Threshold:  6.3mV    Right Ventricle: Medtronic 4076 CapsureFix [id number]   Lead Imp:  393 Ohms, Pacing Threshold:  1.7V@0.4ms, and Sensing Threshold:  3.8mV       Old Leads Present/Abandoned: No    Lead Alert(s):  No    Lead Issues/Concerns:     Diagnostic Information  Intrinsic Rhythm:  SB in the 50's    Atrial Fibrillation:  Paroxysmal Atrial Fibrillation  Takes Anticoagulant or LAAO? Yes,  Xarelto  CHADS-score: 5 (Age, Sex, CHF, HTN, CAD)     Pacing Percentages  Atrial Pacing 9% and Ventricle Pacing 1%  Pacemaker Dependent? No      Ejection Fraction  Last EF Date:  1/2023    By Echocardiogram  Last EF Measurement:  55-60%            Device RN: VICTOR HUGO Sarmiento RN

## 2024-08-21 RX ORDER — FUROSEMIDE 40 MG
20 TABLET ORAL 2 TIMES DAILY
Qty: 90 TABLET | Refills: 2 | Status: SHIPPED | OUTPATIENT
Start: 2024-08-21

## 2024-08-26 ENCOUNTER — TELEPHONE (OUTPATIENT)
Dept: CARDIOLOGY | Facility: CLINIC | Age: 84
End: 2024-08-26
Payer: MEDICARE

## 2024-08-26 DIAGNOSIS — I48.0 PAROXYSMAL ATRIAL FIBRILLATION (H): ICD-10-CM

## 2024-08-26 DIAGNOSIS — Z45.010 PACEMAKER AT END OF BATTERY LIFE: Primary | ICD-10-CM

## 2024-08-26 DIAGNOSIS — I50.32 CHRONIC DIASTOLIC HEART FAILURE (H): ICD-10-CM

## 2024-08-26 DIAGNOSIS — Z95.0 CARDIAC PACEMAKER IN SITU: ICD-10-CM

## 2024-08-26 DIAGNOSIS — I49.5 SICK SINUS SYNDROME (H): ICD-10-CM

## 2024-08-26 RX ORDER — CEFAZOLIN SODIUM 2 G/100ML
2 INJECTION, SOLUTION INTRAVENOUS
Status: CANCELLED | OUTPATIENT
Start: 2024-08-26

## 2024-08-26 RX ORDER — LIDOCAINE 40 MG/G
CREAM TOPICAL
Status: CANCELLED | OUTPATIENT
Start: 2024-08-26

## 2024-08-26 RX ORDER — SODIUM CHLORIDE 450 MG/100ML
INJECTION, SOLUTION INTRAVENOUS CONTINUOUS
Status: CANCELLED | OUTPATIENT
Start: 2024-08-26

## 2024-08-26 NOTE — TELEPHONE ENCOUNTER
Pre-procedure instructions for PPM gen change on 9/3/2024 at 10:00     -Review arrival time of 08:00 and location.     Anticoagulation: Xarelto hold for 2 days prior to procedure    Oral diabetes meds: no  Insulin: no  SGLT2 Inhibitors: no  - Invokana (canagliflozin), Farxiga (dapagliflozin), Jardiance (empagliflozin), Steglatro (ertugliflozin), Synjardy (empagliflozin/metformin)  - hold 3-4 days prior to procedure  GLP-1 Agonists: no  - Byetta (exenitide), Victoza (liraglutide), Ozempic, Wegovy, Rybelsus (semaglutide), Trulicity (dulaglutide), Mounjaro (tirzepatide), Bydureon (Exenatide ER), Adlyxin (Lixisendatide)   - (Weekly dosing, hold GLP-1 agonists 7 days before procedure)  - (Daily or BID dosing, hold GLP-1 agonists day before and day of procedure)  - (Oral semaglutide, hold 7 days before procedure due to long half-life)  Diuretic: Furosemide / Metalazone /  Spironolactone    NPO 8 hours prior to arrival time (starting at midnight)  May have clear liquids 2 hours prior to arrival time (up until 06:00am)    -Shower the morning of the procedure, and then put on a clean shirt in order to help prevent infection.     -Pt to call [facility] at [phone] if pt has any new COVID like symptoms or if feeling unwell the evening prior or AM of procedure.      -Post-procedure transportation and 24 hours monitoring set up. Please call before coming in if plans change.  With limited bed availability due to COVID, overnight hospital stays will be allowed for clinical reasons only.    -No driving for 24 hours post procedure due to sedation.     MRSA history?: no  Allergy to PCN or ancef?: no. Reaction: na  Contrast allergy?: no    -INR check scheduled: NA  (INR not needed)     Pt verbalized understanding of instructions.

## 2024-08-27 NOTE — TELEPHONE ENCOUNTER
Called pt again and left VM with phone number for [facility] for pt to call back for instructions.  Called daughter (CTC on file). Message also left for her to call for instructions. Device phone number provided.  Ck PRETTY

## 2024-08-28 NOTE — TELEPHONE ENCOUNTER
Called pt and reviewed all pre procedure instructions. Questions answered. Verb understanding of instructions.   Ck PRETTY

## 2024-08-29 ENCOUNTER — OFFICE VISIT (OUTPATIENT)
Dept: CARDIOLOGY | Facility: CLINIC | Age: 84
End: 2024-08-29
Payer: COMMERCIAL

## 2024-08-29 VITALS
WEIGHT: 259 LBS | HEIGHT: 72 IN | HEART RATE: 66 BPM | SYSTOLIC BLOOD PRESSURE: 136 MMHG | OXYGEN SATURATION: 97 % | BODY MASS INDEX: 35.08 KG/M2 | DIASTOLIC BLOOD PRESSURE: 74 MMHG

## 2024-08-29 DIAGNOSIS — Z45.010 PACEMAKER AT END OF BATTERY LIFE: ICD-10-CM

## 2024-08-29 PROCEDURE — 99213 OFFICE O/P EST LOW 20 MIN: CPT | Performed by: NURSE PRACTITIONER

## 2024-08-29 NOTE — PROGRESS NOTES
Electrophysiology Clinic Progress Note  Erich Craven MRN# [id number]   YOB: 1940 Age: 84 year old     Primary cardiologist: Dr. Portillo    Reason for visit: PPM    History of presenting illness:    Erich Craven is a pleasant 84 year old patient with past medical history significant for:    CAD: Status post stenting to the LAD and RCA in 2014  Paroxysmal atrial fibrillation: Rate control with diltiazem 300 mg daily  [id number] Score 5 (age++, CAD, type 2 diabetes, hypertension) on Xarelto  Diastolic dysfunction  Ascending aortic and aortic root dilatation  Hypertension  Type 2 diabetes  Pulmonary embolism/DVT:  COPD  Hypothyroidism    Today the patient returns for a discussion about the risks and benefits of an upcoming generator change.  He is doing overall well from a cardiopulmonary standpoint.  We went over the procedure in detail as well as the risks/benefits of the procedure were discussed in detail with the patient.  I explained there is an approximately 2-3% risk of serious complication.  Potential complication include but not limited to infection, pneumothorax, lead dislodgment requiring revision, DVT, cardiac perforation, bleeding and other unforeseen issues.    Diagnotic studies:  Device 7/2024: AP 9%,  0% no significant ventricular arrhythmias and mode switch less than 1% of the time  Echocardiogram 1/2024: LVEF of 55 to 60% ascending aorta was measured at 4.0 cm and aortic root 3.9 cm          Assessment and Plan:     ASSESSMENT:    CAD  Status post stenting to the LAD and prox RCA in 2014  LDL 50 1/2024  Atorvastatin 80 mg daily  Asymptomatic    Paroxysmal atrial fibrillation  Rate control with diltiazem 300 mg daily  [id number] Score 5 (age++, CAD, type 2 diabetes, hypertension) on Xarelto    Hypertension  Well-controlled on diltiazem 300 mg daily, lisinopril 20 mg daily, spironolactone 25 mg daily    Aortic root and ascending aortic dilation   Echocardiogram 1/2024:  LVEF of 55 to 60% ascending aorta was measured at 4.0 cm and aortic root 3.9 cm    PLAN:     Proceed with generator changed as scheduled on 9/3  Patient is aware to stop Xarelto 2 days prior to procedure and diuretics the day of procedure.  Please see AVS for all other instructions.     Orders this Visit:  No orders of the defined types were placed in this encounter.    No orders of the defined types were placed in this encounter.    There are no discontinued medications.    Today's clinic visit entailed:  Review of the result(s) of each unique test - Echo, device check, labs  I spent a total of 22 minutes on the day of the visit.   Time spent by me doing chart review, history and exam, documentation and further activities per the note  Provider  Link to [facility] Help Grid     The level of medical decision making during this visit was of moderate complexity.           Review of Systems:     Review of Systems:  Skin:  Negative     Eyes:  Negative    ENT:  Negative    Respiratory:  Positive for dyspnea on exertion;shortness of breath;CPAP;sleep apnea  Cardiovascular:    Positive for;chest pain;edema;lower extremity symptoms;fatigue  Gastroenterology: Negative    Genitourinary:  Negative    Musculoskeletal:  Positive for (shoulder pain) arthritis (patient in PT for both shoulders)  Neurologic:  Negative numbness or tingling of hands  Psychiatric:  Negative    Heme/Lymph/Imm:  Negative    Endocrine:  Positive for thyroid disorder            Physical Exam:     Vitals: /74   Pulse 66   Ht 1.829 m (6')   Wt 117.5 kg (259 lb)   SpO2 97%   BMI 35.13 kg/m    Constitutional: Well nourished and in no apparent distress.  Eyes: Pupils equal, round.   HEENT: Normocephalic, atraumatic.   Neck: Supple.   Respiratory: Breathing non-labored. Lungs clear to auscultation bilaterally.  Cardiovascular:  Regular rate and rhythm, normal S1 and S2. No murmur   Skin: Warm, dry.   Extremities: Bilateral lower extremity edema with  compression socks in place  Neurologic: No gross motor deficits. Alert, awake, and oriented to person, place and time.  Psychiatric: Affect appropriate.        CURRENT MEDICATIONS:  Current Outpatient Medications   Medication Sig Dispense Refill    acetaminophen (TYLENOL) 500 MG tablet Take 1,000 mg by mouth 3 times daily      aspirin (ASA) 81 MG chewable tablet Take 1 tablet by mouth daily      atorvastatin (LIPITOR) 80 MG tablet Take 1 tablet (80 mg) by mouth every evening 90 tablet 4    diltiazem ER COATED BEADS (CARDIZEM CD/CARTIA XT) 300 MG 24 hr capsule Take 1 capsule (300 mg) by mouth daily 90 capsule 3    ferrous sulfate (SLOW FE) 142 (45 Fe) MG CR tablet TAKE ONE TABLET BY MOUTH EVERY OTHER DAY 45 tablet 1    Fluticasone-Umeclidin-Vilanterol (TRELEGY ELLIPTA) 200-62.5-25 MCG/INH oral inhaler Inhale 1 puff into the lungs daily      furosemide (LASIX) 40 MG tablet Take 0.5 tablets (20 mg) by mouth 2 times daily. 90 tablet 2    isosorbide mononitrate (IMDUR) 30 MG 24 hr tablet Take 1 tablet (30 mg) by mouth daily 90 tablet 3    levothyroxine (SYNTHROID/LEVOTHROID) 175 MCG tablet Take 1 tablet (175 mcg) by mouth daily 90 tablet 3    lisinopril (ZESTRIL) 20 MG tablet Take 1 tablet (20 mg) by mouth daily 90 tablet 3    metolazone (ZAROXOLYN) 2.5 MG tablet Take 1 tablet (2.5 mg) by mouth 2 times daily 180 tablet 0    Multiple vitamin TABS Take 1 tablet by mouth daily      polyethylene glycol (MIRALAX) 17 g packet Take 17 g by mouth daily (Patient taking differently: Take 1 packet by mouth daily as needed.) 7 packet 0    potassium chloride cammy ER (KLOR-CON M10) 10 MEQ CR tablet Take 1 tablet (10 mEq) by mouth 2 times daily 180 tablet 1    spironolactone (ALDACTONE) 25 MG tablet TAKE 1 TABLET(25 MG) BY MOUTH MWFSat 90 tablet 3    tamsulosin (FLOMAX) 0.4 MG capsule Take 1 capsule (0.4 mg) by mouth daily 90 capsule 0    venlafaxine (EFFEXOR) 75 MG tablet TAKE 1 TABLET(75 MG) BY MOUTH TWICE DAILY 180 tablet 0     XARELTO ANTICOAGULANT 20 MG TABS tablet TAKE 1 TABLET(20 MG) BY MOUTH DAILY WITH DINNER 30 tablet 11    ondansetron (ZOFRAN) 4 MG tablet Take 1 tablet (4 mg) by mouth every 8 hours as needed for nausea 30 tablet 5       ALLERGIES  Allergies   Allergen Reactions    No Known Allergies          PAST MEDICAL HISTORY:  Past Medical History:   Diagnosis Date    (HFpEF) heart failure with preserved ejection fraction (H)     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 Started in 2012    Hypothyroidism     Lymphedema     Mumps     Obesity, Class III, BMI 40-49.9 (morbid obesity) (H)     RIN (obstructive sleep apnea)     has refused CPAP    Pacemaker     Paroxysmal A-fib (H)     PE (pulmonary thromboembolism) (H)     Status post coronary angiogram 11/05/2019       PAST SURGICAL HISTORY:  Past Surgical History:   Procedure Laterality Date    cardiac stenting      COLONOSCOPY  2010    CV RIGHT HEART CATH MEASUREMENTS RECORDED N/A 11/05/2019    Procedure: Right Heart Cath;  Surgeon: Roberto Hernandez MD;  Location:  HEART CARDI[facility] CATH LAB    ENT SURGERY      tonsillectomy    EYE SURGERY  2012    IMPLANT PACEMAKER      INJECT NERVE BLOCK SUPRASCAPULAR Bilateral 08/26/2021    Procedure: BLOCK, NERVE, SUPRASCAPULAR, Bilateral, under ultrasound guidance;  Surgeon: Swetha Allen MD;  Location: [facility] OR    INJECT NERVE BLOCK SUPRASCAPULAR Bilateral 09/23/2021    Procedure: bilateral suprascapular nerve block;  Surgeon: Swetha Allen MD;   Location: [facility] OR    INJECT NERVE BLOCK SUPRASCAPULAR Left 09/15/2022    Procedure: BLOCK, NERVE, SUPRASCAPULAR- left;  Surgeon: Swetha Allen MD;  Location: [facility] OR    INJECT NERVE BLOCK SUPRASCAPULAR Left 10/20/2022    Procedure: BLOCK, NERVE, SUPRASCAPULAR- left;  Surgeon: Swetha Allen MD;  Location: [facility] OR    INJECT NERVE BLOCK SUPRASCAPULAR Left 2023    Procedure: BLOCK, NERVE, SUPRASCAPULAR (left);  Surgeon: Swetha Allen MD;  Location: [facility] OR    NERVE BLOCK PERIPHERAL Bilateral 03/10/2022    Procedure: bilateral suprascapular nerve block;  Surgeon: Swetha Allen MD;  Location: [facility] OR    ORTHOPEDIC SURGERY Right 2012    knee replacement    REVERSE ARTHROPLASTY SHOULDER Left 2023    Procedure: ARTHROPLASTY, SHOULDER, TOTAL, REVERSE LEFT;  Surgeon: Katelyn Vasquez MD;  Location: [facility] OR    VASCULAR SURGERY         FAMILY HISTORY:  Family History   Problem Relation Age of Onset    Brain Cancer Mother     Other Cancer Mother     Anxiety Disorder Mother     Cerebrovascular Disease Father     Hypertension Father     Obesity Father     Substance Abuse Son     Osteoporosis Sister     Thyroid Disease Sister     Anesthesia Reaction No family hx of     Thrombosis No family hx of        SOCIAL HISTORY:  Social History     Socioeconomic History    Marital status:      Spouse name: None    Number of children: 5    Years of education: None    Highest education level: None   Occupational History    Occupation: retired teacher at [employer]   Tobacco Use    Smoking status: Former     Current packs/day: 0.00     Average packs/day: 0.5 packs/day for 30.0 years (15.0 ttl pk-yrs)     Types: Cigarettes     Start date: 6/15/1959     Quit date: 1989     Years since quittin.2     Passive exposure: Past    Smokeless tobacco: Never    Tobacco comments:     smoked from  to    Vaping Use    Vaping status: Never Used   Substance and Sexual Activity    Alcohol use: No      Comment: quit in 1989; recovering    Drug use: No    Sexual activity: Not Currently     Partners: Female   Other Topics Concern    Parent/sibling w/ CABG, MI or angioplasty before 65F 55M? No     Social Determinants of Health     Financial Resource Strain: Low Risk  (1/10/2024)    Financial Resource Strain     Within the past 12 months, have you or your family members you live with been unable to get utilities (heat, electricity) when it was really needed?: No   Food Insecurity: Low Risk  (1/10/2024)    Food Insecurity     Within the past 12 months, did you worry that your food would run out before you got money to buy more?: No     Within the past 12 months, did the food you bought just not last and you didn t have money to get more?: No   Transportation Needs: Low Risk  (1/10/2024)    Transportation Needs     Within the past 12 months, has lack of transportation kept you from medical appointments, getting your medicines, non-medical meetings or appointments, work, or from getting things that you need?: No   Physical Activity: Inactive (7/1/2022)    [facility]    Exercise Vital Sign     Days of Exercise per Week: 0 days     Minutes of Exercise per Session: 0 min   Stress: No Stress Concern Present (7/1/2022)    Received from [facility]    [employer] [employer] - Occupational Stress Questionnaire     Feeling of Stress : Not at all   Social Connections: Moderately Integrated (7/1/2022)    Received from [facility]    Social Connection and Isolation Panel [NHANES]     Frequency of Communication with Friends and Family: More than three times a week     Frequency of Social Gatherings with Friends and Family: Once a week     Attends [employer] Services: 1 to 4 times per year     Active Member of Clubs or Organizations: Yes     Attends Club or Organization Meetings: More than 4 times per year     Marital Status:    Interpersonal Safety: Low  Risk  (4/10/2024)    Interpersonal Safety     Do you feel physically and emotionally safe where you currently live?: Yes     Within the past 12 months, have you been hit, slapped, kicked or otherwise physically hurt by someone?: No     Within the past 12 months, have you been humiliated or emotionally abused in other ways by your partner or ex-partner?: No   Housing Stability: Low Risk  (1/10/2024)    Housing Stability     Do you have housing? : Yes     Are you worried about losing your housing?: No

## 2024-08-29 NOTE — PATIENT INSTRUCTIONS
Check in at 8 am for a PPM gen change on 9/3/2024 at 10:00 am     HOLD THESE MEDICATIONS PRIOR:   Xarelto hold for 2 days prior to procedure (Last dose 8/31)    Furosemide / Metalazone /  Spironolactone    Nothing to eat 8 hours prior to arrival time (starting at midnight)  May have clear liquids 2 hours prior to arrival time (up until 06:00am)    Shower the morning of the procedure, and then put on a clean shirt in order to help prevent infection.     Call [facility] at [phone] if pt has any new COVID like symptoms or if feeling unwell the evening prior or AM of procedure.      Post-procedure transportation and 24 hours monitoring set up. Please call before coming in if plans change.  With limited bed availability due to COVID, overnight hospital stays will be allowed for clinical reasons only.    No driving for 24 hours post procedure due to sedation.

## 2024-08-29 NOTE — LETTER
8/29/2024    Anne Heath MD  [address]  [place] [address]    RE: Erich Craven       Dear Colleague,     I had the pleasure of seeing Erich Craven in the [facility].    Electrophysiology Clinic Progress Note  Erich Craven MRN# [id number]   YOB: 1940 Age: 84 year old     Primary cardiologist: Dr. Portillo    Reason for visit: PPM    History of presenting illness:    Erich Craven is a pleasant 84 year old patient with past medical history significant for:    CAD: Status post stenting to the LAD and RCA in 2014  Paroxysmal atrial fibrillation: Rate control with diltiazem 300 mg daily  [id number] Score 5 (age++, CAD, type 2 diabetes, hypertension) on Xarelto  Diastolic dysfunction  Ascending aortic and aortic root dilatation  Hypertension  Type 2 diabetes  Pulmonary embolism/DVT:  COPD  Hypothyroidism    Today the patient returns for a discussion about the risks and benefits of an upcoming generator change.  He is doing overall well from a cardiopulmonary standpoint.  We went over the procedure in detail as well as the risks/benefits of the procedure were discussed in detail with the patient.  I explained there is an approximately 2-3% risk of serious complication.  Potential complication include but not limited to infection, pneumothorax, lead dislodgment requiring revision, DVT, cardiac perforation, bleeding and other unforeseen issues.    Diagnotic studies:  Device 7/2024: AP 9%,  0% no significant ventricular arrhythmias and mode switch less than 1% of the time  Echocardiogram 1/2024: LVEF of 55 to 60% ascending aorta was measured at 4.0 cm and aortic root 3.9 cm          Assessment and Plan:     ASSESSMENT:    CAD  Status post stenting to the LAD and prox RCA in 2014  LDL 50 1/2024  Atorvastatin 80 mg daily  Asymptomatic    Paroxysmal atrial fibrillation  Rate control with diltiazem 300 mg daily  [id number] Score 5 (age++, CAD, type 2  diabetes, hypertension) on Xarelto    Hypertension  Well-controlled on diltiazem 300 mg daily, lisinopril 20 mg daily, spironolactone 25 mg daily    Aortic root and ascending aortic dilation   Echocardiogram 1/2024: LVEF of 55 to 60% ascending aorta was measured at 4.0 cm and aortic root 3.9 cm    PLAN:     Proceed with generator changed as scheduled on 9/3  Patient is aware to stop Xarelto 2 days prior to procedure and diuretics the day of procedure.  Please see AVS for all other instructions.     Orders this Visit:  No orders of the defined types were placed in this encounter.    No orders of the defined types were placed in this encounter.    There are no discontinued medications.    Today's clinic visit entailed:  Review of the result(s) of each unique test - Echo, device check, labs  I spent a total of 22 minutes on the day of the visit.   Time spent by me doing chart review, history and exam, documentation and further activities per the note  Provider  Link to [facility] Help Grid     The level of medical decision making during this visit was of moderate complexity.           Review of Systems:     Review of Systems:  Skin:  Negative     Eyes:  Negative    ENT:  Negative    Respiratory:  Positive for dyspnea on exertion;shortness of breath;CPAP;sleep apnea  Cardiovascular:    Positive for;chest pain;edema;lower extremity symptoms;fatigue  Gastroenterology: Negative    Genitourinary:  Negative    Musculoskeletal:  Positive for (shoulder pain) arthritis (patient in PT for both shoulders)  Neurologic:  Negative numbness or tingling of hands  Psychiatric:  Negative    Heme/Lymph/Imm:  Negative    Endocrine:  Positive for thyroid disorder            Physical Exam:     Vitals: /74   Pulse 66   Ht 1.829 m (6')   Wt 117.5 kg (259 lb)   SpO2 97%   BMI 35.13 kg/m    Constitutional: Well nourished and in no apparent distress.  Eyes: Pupils equal, round.   HEENT: Normocephalic, atraumatic.   Neck: Supple.    Respiratory: Breathing non-labored. Lungs clear to auscultation bilaterally.  Cardiovascular:  Regular rate and rhythm, normal S1 and S2. No murmur   Skin: Warm, dry.   Extremities: Bilateral lower extremity edema with compression socks in place  Neurologic: No gross motor deficits. Alert, awake, and oriented to person, place and time.  Psychiatric: Affect appropriate.        CURRENT MEDICATIONS:  Current Outpatient Medications   Medication Sig Dispense Refill     acetaminophen (TYLENOL) 500 MG tablet Take 1,000 mg by mouth 3 times daily       aspirin (ASA) 81 MG chewable tablet Take 1 tablet by mouth daily       atorvastatin (LIPITOR) 80 MG tablet Take 1 tablet (80 mg) by mouth every evening 90 tablet 4     diltiazem ER COATED BEADS (CARDIZEM CD/CARTIA XT) 300 MG 24 hr capsule Take 1 capsule (300 mg) by mouth daily 90 capsule 3     ferrous sulfate (SLOW FE) 142 (45 Fe) MG CR tablet TAKE ONE TABLET BY MOUTH EVERY OTHER DAY 45 tablet 1     Fluticasone-Umeclidin-Vilanterol (TRELEGY ELLIPTA) 200-62.5-25 MCG/INH oral inhaler Inhale 1 puff into the lungs daily       furosemide (LASIX) 40 MG tablet Take 0.5 tablets (20 mg) by mouth 2 times daily. 90 tablet 2     isosorbide mononitrate (IMDUR) 30 MG 24 hr tablet Take 1 tablet (30 mg) by mouth daily 90 tablet 3     levothyroxine (SYNTHROID/LEVOTHROID) 175 MCG tablet Take 1 tablet (175 mcg) by mouth daily 90 tablet 3     lisinopril (ZESTRIL) 20 MG tablet Take 1 tablet (20 mg) by mouth daily 90 tablet 3     metolazone (ZAROXOLYN) 2.5 MG tablet Take 1 tablet (2.5 mg) by mouth 2 times daily 180 tablet 0     Multiple vitamin TABS Take 1 tablet by mouth daily       polyethylene glycol (MIRALAX) 17 g packet Take 17 g by mouth daily (Patient taking differently: Take 1 packet by mouth daily as needed.) 7 packet 0     potassium chloride cammy ER (KLOR-CON M10) 10 MEQ CR tablet Take 1 tablet (10 mEq) by mouth 2 times daily 180 tablet 1     spironolactone (ALDACTONE) 25 MG tablet TAKE  1 TABLET(25 MG) BY MOUTH MWFSat 90 tablet 3     tamsulosin (FLOMAX) 0.4 MG capsule Take 1 capsule (0.4 mg) by mouth daily 90 capsule 0     venlafaxine (EFFEXOR) 75 MG tablet TAKE 1 TABLET(75 MG) BY MOUTH TWICE DAILY 180 tablet 0     XARELTO ANTICOAGULANT 20 MG TABS tablet TAKE 1 TABLET(20 MG) BY MOUTH DAILY WITH DINNER 30 tablet 11     ondansetron (ZOFRAN) 4 MG tablet Take 1 tablet (4 mg) by mouth every 8 hours as needed for nausea 30 tablet 5       ALLERGIES  Allergies   Allergen Reactions     No Known Allergies          PAST MEDICAL HISTORY:  Past Medical History:   Diagnosis Date     (HFpEF) heart failure with preserved ejection fraction (H)      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 Started in 2012     Hypothyroidism      Lymphedema      Mumps      Obesity, Class III, BMI 40-49.9 (morbid obesity) (H)      RIN (obstructive sleep apnea)     has refused CPAP     Pacemaker      Paroxysmal A-fib (H)      PE (pulmonary thromboembolism) (H)      Status post coronary angiogram 11/05/2019       PAST SURGICAL HISTORY:  Past Surgical History:   Procedure Laterality Date     cardiac stenting       COLONOSCOPY  2010     CV RIGHT HEART CATH MEASUREMENTS RECORDED N/A 11/05/2019    Procedure: Right Heart Cath;  Surgeon: Roberto Hernandez MD;  Location:  HEART CARDIAC CATH LAB     ENT SURGERY      tonsillectomy     EYE SURGERY  2012     IMPLANT PACEMAKER       INJECT NERVE BLOCK SUPRASCAPULAR Bilateral 08/26/2021     Procedure: BLOCK, NERVE, SUPRASCAPULAR, Bilateral, under ultrasound guidance;  Surgeon: Swetha Allen MD;  Location: [facility] OR     INJECT NERVE BLOCK SUPRASCAPULAR Bilateral 09/23/2021    Procedure: bilateral suprascapular nerve block;  Surgeon: Swetha Allen MD;  Location: [facility] OR     INJECT NERVE BLOCK SUPRASCAPULAR Left 09/15/2022    Procedure: BLOCK, NERVE, SUPRASCAPULAR- left;  Surgeon: Swetha Allen MD;  Location: [facility] OR     INJECT NERVE BLOCK SUPRASCAPULAR Left 10/20/2022    Procedure: BLOCK, NERVE, SUPRASCAPULAR- left;  Surgeon: Swetha Allen MD;  Location: [facility] OR     INJECT NERVE BLOCK SUPRASCAPULAR Left 01/05/2023    Procedure: BLOCK, NERVE, SUPRASCAPULAR (left);  Surgeon: Swetha Allen MD;  Location: [facility] OR     NERVE BLOCK PERIPHERAL Bilateral 03/10/2022    Procedure: bilateral suprascapular nerve block;  Surgeon: Swetha Allen MD;  Location: [facility] OR     ORTHOPEDIC SURGERY Right 2012    knee replacement     REVERSE ARTHROPLASTY SHOULDER Left 02/21/2023    Procedure: ARTHROPLASTY, SHOULDER, TOTAL, REVERSE LEFT;  Surgeon: Katelyn Vasquez MD;  Location: [facility]     VASCULAR SURGERY  2022       FAMILY HISTORY:  Family History   Problem Relation Age of Onset     Brain Cancer Mother      Other Cancer Mother      Anxiety Disorder Mother      Cerebrovascular Disease Father      Hypertension Father      Obesity Father      Substance Abuse Son      Osteoporosis Sister      Thyroid Disease Sister      Anesthesia Reaction No family hx of      Thrombosis No family hx of        SOCIAL HISTORY:  Social History     Socioeconomic History     Marital status:      Spouse name: None     Number of children: 5     Years of education: None     Highest education level: None   Occupational History     Occupation: retired teacher at [employer]   Tobacco Use     Smoking status: Former     Current packs/day: 0.00     Average packs/day: 0.5 packs/day for 30.0 years (15.0 ttl pk-yrs)     Types:  Cigarettes     Start date: 6/15/1959     Quit date: 1989     Years since quittin.2     Passive exposure: Past     Smokeless tobacco: Never     Tobacco comments:     smoked from  to    Vaping Use     Vaping status: Never Used   Substance and Sexual Activity     Alcohol use: No     Comment: quit in ; recovering     Drug use: No     Sexual activity: Not Currently     Partners: Female   Other Topics Concern     Parent/sibling w/ CABG, MI or angioplasty before 65F 55M? No     Social Determinants of Health     Financial Resource Strain: Low Risk  (1/10/2024)    Financial Resource Strain      Within the past 12 months, have you or your family members you live with been unable to get utilities (heat, electricity) when it was really needed?: No   Food Insecurity: Low Risk  (1/10/2024)    Food Insecurity      Within the past 12 months, did you worry that your food would run out before you got money to buy more?: No      Within the past 12 months, did the food you bought just not last and you didn t have money to get more?: No   Transportation Needs: Low Risk  (1/10/2024)    Transportation Needs      Within the past 12 months, has lack of transportation kept you from medical appointments, getting your medicines, non-medical meetings or appointments, work, or from getting things that you need?: No   Physical Activity: Inactive (2022)    Received from [facility]    Exercise Vital Sign      Days of Exercise per Week: 0 days      Minutes of Exercise per Session: 0 min   Stress: No Stress Concern Present (2022)    Received from [facility]    [employer] - Occupational Stress Questionnaire      Feeling of Stress : Not at all   Social Connections: Moderately Integrated (2022)    Received from [facility]    Social Connection and Isolation Panel [NHANES]      Frequency of Communication with Friends and Family: More than three times  a week      Frequency of Social Gatherings with Friends and Family: Once a week      Attends Judaism Services: 1 to 4 times per year      Active Member of Clubs or Organizations: Yes      Attends Club or Organization Meetings: More than 4 times per year      Marital Status:    Interpersonal Safety: Low Risk  (4/10/2024)    Interpersonal Safety      Do you feel physically and emotionally safe where you currently live?: Yes      Within the past 12 months, have you been hit, slapped, kicked or otherwise physically hurt by someone?: No      Within the past 12 months, have you been humiliated or emotionally abused in other ways by your partner or ex-partner?: No   Housing Stability: Low Risk  (1/10/2024)    Housing Stability      Do you have housing? : Yes      Are you worried about losing your housing?: No               Thank you for allowing me to participate in the care of your patient.      Sincerely,     VAMSI Aguirre [facility]  cc:   Valentina Freeman MD  [address] [facility] [address]  [place] [id number]

## 2024-09-01 ENCOUNTER — NURSE TRIAGE (OUTPATIENT)
Dept: NURSING | Facility: CLINIC | Age: 84
End: 2024-09-01
Payer: MEDICARE

## 2024-09-01 ENCOUNTER — OFFICE VISIT (OUTPATIENT)
Dept: URGENT CARE | Facility: URGENT CARE | Age: 84
End: 2024-09-01
Payer: COMMERCIAL

## 2024-09-01 VITALS
OXYGEN SATURATION: 97 % | TEMPERATURE: 96.8 F | HEART RATE: 66 BPM | DIASTOLIC BLOOD PRESSURE: 78 MMHG | BODY MASS INDEX: 34.45 KG/M2 | RESPIRATION RATE: 16 BRPM | SYSTOLIC BLOOD PRESSURE: 137 MMHG | WEIGHT: 254 LBS

## 2024-09-01 DIAGNOSIS — R30.0 DYSURIA: ICD-10-CM

## 2024-09-01 DIAGNOSIS — N18.31 CHRONIC KIDNEY DISEASE, STAGE 3A (H): ICD-10-CM

## 2024-09-01 DIAGNOSIS — N30.01 ACUTE CYSTITIS WITH HEMATURIA: Primary | ICD-10-CM

## 2024-09-01 LAB
ALBUMIN UR-MCNC: 30 MG/DL
APPEARANCE UR: CLEAR
BACTERIA #/AREA URNS HPF: ABNORMAL /HPF
BILIRUB UR QL STRIP: NEGATIVE
COLOR UR AUTO: YELLOW
GLUCOSE UR STRIP-MCNC: NEGATIVE MG/DL
HGB UR QL STRIP: ABNORMAL
KETONES UR STRIP-MCNC: NEGATIVE MG/DL
LEUKOCYTE ESTERASE UR QL STRIP: ABNORMAL
NITRATE UR QL: NEGATIVE
PH UR STRIP: 7 [PH] (ref 5–7)
RBC #/AREA URNS AUTO: ABNORMAL /HPF
SP GR UR STRIP: 1.02 (ref 1–1.03)
SQUAMOUS #/AREA URNS AUTO: ABNORMAL /LPF
UROBILINOGEN UR STRIP-ACNC: 0.2 E.U./DL
WBC #/AREA URNS AUTO: >100 /HPF

## 2024-09-01 PROCEDURE — 81001 URINALYSIS AUTO W/SCOPE: CPT | Performed by: NURSE PRACTITIONER

## 2024-09-01 PROCEDURE — 87086 URINE CULTURE/COLONY COUNT: CPT | Performed by: NURSE PRACTITIONER

## 2024-09-01 PROCEDURE — 87186 SC STD MICRODIL/AGAR DIL: CPT | Performed by: NURSE PRACTITIONER

## 2024-09-01 PROCEDURE — 87088 URINE BACTERIA CULTURE: CPT | Performed by: NURSE PRACTITIONER

## 2024-09-01 PROCEDURE — 99214 OFFICE O/P EST MOD 30 MIN: CPT | Performed by: NURSE PRACTITIONER

## 2024-09-01 RX ORDER — CEFTRIAXONE SODIUM 1 G
1 VIAL (EA) INJECTION ONCE
Status: CANCELLED | OUTPATIENT
Start: 2024-09-01 | End: 2024-09-01

## 2024-09-01 RX ORDER — SULFAMETHOXAZOLE/TRIMETHOPRIM 800-160 MG
1 TABLET ORAL 2 TIMES DAILY
Qty: 14 TABLET | Refills: 0 | Status: SHIPPED | OUTPATIENT
Start: 2024-09-01 | End: 2024-09-03

## 2024-09-01 NOTE — PROGRESS NOTES
Assessment & Plan     Acute cystitis with hematuria    - sulfamethoxazole-trimethoprim (BACTRIM DS) 800-160 MG tablet  Dispense: 14 tablet; Refill: 0    Dysuria    - UA Macroscopic with reflex to Microscopic and Culture  - Urine Microscopic Exam  - Urine Culture    Chronic kidney disease, stage 3a (H)       Reviewed UA during visit showing likely UTI. Prescription sent to pharmacy for Bactrim twice daily for 7 days. Urine culture in process, will notify if patient should stop or change antibiotic. Increase fluid intake. Creatinine clearance calculator 79 ml/min, dose adjustment not indicated.     Follow-up with PCP if symptoms persist for 3 days, and sooner if symptoms worsen or new symptoms develop.     Discussed red flag symptoms which warrant immediate visit in emergency room    All questions were answered and patient verbalized understanding. AVS reviewed with patient.     Victoria Raphael, MAKENNA, APRN, CNP 9/1/2024 2:29 PM  [facility]    Vernell Zamorano is a 84 year old male who presents to clinic today for the following health issues:  Chief Complaint   Patient presents with    UTI     Slow urination, frequency and painful urination for about  days.       UTI    Onset of symptoms was a couple days  Course of illness is same  Current and associated symptoms dysuria, urinary frequency, urgency, midline back discomfort  Denies fever, abdominal pain, flank pain, emesis, nausea, hematuria  Treatment and measures tried None  He drinks plenty of water. Does not drink caffeine.   No history of kidney stone or kidney infection.     He has a history of COPD, CHF, morbid obesity, atrial fibrillation, DVT, CKD stage 3, type 2 diabetes. Creatinine 1.11 on 8/6/24. Blood sugars have been stable.     He was treated with levofloxacin 7/29/24 for epididymitis and UTI.    Problem list, Medication list, Allergies, and Medical history reviewed in EPIC.    ROS:  Review of systems negative except  for noted above        Objective    /78 (BP Location: Left arm, Patient Position: Sitting, Cuff Size: Adult Large)   Pulse 66   Temp 96.8  F (36  C) (Tympanic)   Resp 16   Wt 115.2 kg (254 lb)   SpO2 97%   BMI 34.45 kg/m    Physical Exam  Constitutional:       General: He is not in acute distress.     Appearance: He is obese. He is not toxic-appearing or diaphoretic.   Abdominal:      General: Bowel sounds are normal. There is no distension.      Palpations: Abdomen is soft.      Tenderness: There is no abdominal tenderness. There is no right CVA tenderness, left CVA tenderness, guarding or rebound.   Lymphadenopathy:      Cervical: No cervical adenopathy.   Skin:     General: Skin is warm and dry.   Neurological:      Mental Status: He is alert.              Labs:  Results for orders placed or performed in visit on 09/01/24   UA Macroscopic with reflex to Microscopic and Culture     Status: Abnormal    Specimen: Urine, Midstream   Result Value Ref Range    Color Urine Yellow Colorless, Straw, Light Yellow, Yellow    Appearance Urine Clear Clear    Glucose Urine Negative Negative mg/dL    Bilirubin Urine Negative Negative    Ketones Urine Negative Negative mg/dL    Specific Gravity Urine 1.020 1.003 - 1.035    Blood Urine Small (A) Negative    pH Urine 7.0 5.0 - 7.0    Protein Albumin Urine 30 (A) Negative mg/dL    Urobilinogen Urine 0.2 0.2, 1.0 E.U./dL    Nitrite Urine Negative Negative    Leukocyte Esterase Urine Moderate (A) Negative   Urine Microscopic Exam     Status: Abnormal   Result Value Ref Range    Bacteria Urine Few (A) None Seen /HPF    RBC Urine 25-50 (A) 0-2 /HPF /HPF    WBC Urine >100 (A) 0-5 /HPF /HPF    Squamous Epithelials Urine Few (A) None Seen /LPF

## 2024-09-01 NOTE — TELEPHONE ENCOUNTER
Appt Tuesday for knee replacement.  Painful urination  Urgency  Frequency.  Afebrile.  Mild back pain.  Per the protocol, I recommended he be seen within 4 hours.  Caller stated understanding and agreement.  He'll go to [facility],  now.          Reason for Disposition   [1] Pain or burning with passing urine (urination) AND [2] male   Diabetes mellitus or weak immune system (e.g., HIV positive, cancer chemo, splenectomy, organ transplant, chronic steroids)    Additional Information   Negative: Shock suspected (e.g., cold/pale/clammy skin, too weak to stand, low BP, rapid pulse)   Negative: Sounds like a life-threatening emergency to the triager   Negative: Followed a female genital area injury (e.g., labia, vagina, vulva)   Negative: Followed a male genital area injury (e.g., penis, scrotum)   Negative: Vaginal discharge   Negative: Pus (white, yellow) or bloody discharge from end of penis   Negative: [1] Taking antibiotic for urinary tract infection (UTI) AND [2] female   Negative: [1] Taking antibiotic for urinary tract infection (UTI) AND [2] male   Negative: [1] Pain or burning with passing urine (urination) AND [2] pregnant   Negative: [1] Pain or burning with passing urine (urination) AND [2] postpartum (from 0 to 6 weeks after delivery)   Negative: [1] Pain or burning with passing urine (urination) AND [2] female   Negative: Shock suspected (e.g., cold/pale/clammy skin, too weak to stand, low BP, rapid pulse)   Negative: Sounds like a life-threatening emergency to the triager   Negative: Followed a genital area injury (e.g., penis, scrotum)   Negative: Taking antibiotic for urinary tract infection (UTI)   Negative: Pain in scrotum is main symptom   Negative: [1] Unable to urinate (or only a few drops) > 4 hours AND [2] bladder feels very full (e.g., feels blocked with strong urge to urinate; palpable bladder)   Negative: Swollen scrotum   Negative: [1] Constant pain in scrotum or testicle AND [2] present > 1  hour   Negative: Vomiting   Negative: Patient sounds very sick or weak to the triager   Negative: [1] SEVERE pain with urination (e.g., excruciating) AND [2] not improved after 2 hours of pain medicine (e.g., acetaminophen or ibuprofen)   Negative: Fever > 100.4 F (38.0 C)   Negative: Side (flank) or lower back pain present    Protocols used: Urinary Symptoms-A-AH, Urination Pain - Male-A-AH  Rosa MONACO RN [facility]

## 2024-09-04 ENCOUNTER — MYC MEDICAL ADVICE (OUTPATIENT)
Dept: FAMILY MEDICINE | Facility: CLINIC | Age: 84
End: 2024-09-04

## 2024-09-04 ENCOUNTER — TELEPHONE (OUTPATIENT)
Dept: URGENT CARE | Facility: URGENT CARE | Age: 84
End: 2024-09-04

## 2024-09-04 DIAGNOSIS — N30.00 ACUTE CYSTITIS WITHOUT HEMATURIA: Primary | ICD-10-CM

## 2024-09-04 LAB — BACTERIA UR CULT: ABNORMAL

## 2024-09-04 RX ORDER — NITROFURANTOIN 25; 75 MG/1; MG/1
100 CAPSULE ORAL 2 TIMES DAILY
Qty: 14 CAPSULE | Refills: 0 | Status: SHIPPED | OUTPATIENT
Start: 2024-09-04 | End: 2024-09-11

## 2024-09-04 NOTE — TELEPHONE ENCOUNTER
Spoke to patient and he was given information on new mediation based on culture results.       Tiffany Singh RN  [facility]

## 2024-09-06 ENCOUNTER — NURSE TRIAGE (OUTPATIENT)
Dept: FAMILY MEDICINE | Facility: CLINIC | Age: 84
End: 2024-09-06
Payer: MEDICARE

## 2024-09-06 NOTE — TELEPHONE ENCOUNTER
TO PCP:     Pt called c/o possible side effects from new medication     Went to  9/1/24 - Started on Macrobid 100mg BID for 7 days on 9/4/24 for UTI     States he is not tolerating medication     Started having side effects yesterday - but progressively worsening     Side effects:   Lots of nausea ,- pharmacist recommended an OTC antinausea medication   Headaches - taking Tylenol, not helping   Hands and tingling/feet numb (has had this in past but is more pronounced now)   Lightheaded   O2 88-89% - usually is 93-94%. Uses Bipap at night, O2 levels do go up with deep breathing. Denies feeling SOB    Feeling unwell      Took 2.5 days or 5 doses of medication     UTI symptoms seem to be improving     Can move from room to room but fatigue is affecting his ability to do day to day activities     Denies fever     Per protocol: go to OV now. Nothing available in clinic. Did advise pt will send a message to PCP to advise on and call him back.     Also did advise of  hours tonight, advised if pt does not hear back can go back to      Please advise - routing for 2nd level triage - no available OVs today     Keisha MARTIN, Triage RN  [facility]     Reason for Disposition   MODERATE weakness (i.e., interferes with work, school, normal activities) and cause unknown (Exceptions: Weakness from acute minor illness or from poor fluid intake; weakness is chronic and not worse.)    Additional Information   Negative: SEVERE difficulty breathing (e.g., struggling for each breath, speaks in single words)   Negative: Shock suspected (e.g., cold/pale/clammy skin, too weak to stand, low BP, rapid pulse)   Negative: Difficult to awaken or acting confused (e.g., disoriented, slurred speech)   Negative: Fainted > 15 minutes ago and still feels too weak or dizzy to stand   Negative: SEVERE weakness (i.e., unable to walk or barely able to walk, requires support) and new-onset or worsening   Negative:  Sounds like a life-threatening emergency to the triager   Negative: Weakness of the face, arm or leg on one side of the body   Negative: Has diabetes mellitus and weakness from low blood sugar (i.e., < 60 mg/dL or 3.5 mmol/L)   Negative: Recent heat exposure, suspected cause of weakness   Negative: Vomiting is main symptom   Negative: Diarrhea is main symptom   Negative: Difficulty breathing   Negative: Heart beating < 50 beats per minute OR > 140 beats per minute   Negative: Extra heartbeats, irregular heart beating, or heart is beating very fast (i.e., 'palpitations')   Negative: Follows large amount of bleeding (e.g., from vomiting, rectum, vagina) (Exception: Small transient weakness from sight of a small amount blood.)   Negative: Black or tarry bowel movements   Negative: MODERATE weakness or fatigue from poor fluid intake with no improvement after 2 hours of rest and fluids   Negative: Drinking very little and dehydration suspected (e.g., no urine > 12 hours, very dry mouth, very lightheaded)   Negative: Patient sounds very sick or weak to the triager    Protocols used: Weakness (Generalized) and Fatigue-A-OH

## 2024-09-07 NOTE — TELEPHONE ENCOUNTER
Provider called patient discussed with him his symptoms.  Advised him to stay on current dose of nitrofurantoin and complete course of antibiotic as the Staph epidermidis was resistant to several other antibiotics.  Short/interrupted course of antibiotic treatment might cause emergence of further resistance including to nitrofurantoin.  Advised to schedule follow-up early next week.  If any worsening symptoms go to the emergency room.  Patient reiterated understanding.

## 2024-09-12 ENCOUNTER — MYC MEDICAL ADVICE (OUTPATIENT)
Dept: FAMILY MEDICINE | Facility: CLINIC | Age: 84
End: 2024-09-12
Payer: MEDICARE

## 2024-09-12 DIAGNOSIS — N39.0 URINARY TRACT INFECTION WITHOUT HEMATURIA, SITE UNSPECIFIED: Primary | ICD-10-CM

## 2024-09-12 NOTE — TELEPHONE ENCOUNTER
Good day Dr. Heath,      Please review patient's MyChart message and advise.         Merissa VILLARREAL MA on 9/12/2024 at 11:39 AM

## 2024-09-12 NOTE — TELEPHONE ENCOUNTER
Order placed for urine test.  Patient schedule follow-up after he does the urinalysis./Culture results.

## 2024-09-16 NOTE — PROGRESS NOTES
9/16/2024 @ 11:30 Left  with patient and daughter to call back for Pre op instructions. Device phone number provided.  Ck PRETTY    Pre-procedure instructions for device gen change on 9/23/2024 at 2:00 pm     -Review arrival time of 12:00 and location.     Anticoagulation: Xarelto (Per notes :Patient is aware to stop Xarelto 2 days prior to procedure and diuretics the day of procedure )  DAPT (Plavix, Effient, Brilinta): no   Oral diabetes meds: no No   Insulin: no  SGLT2 Inhibitors: no  - Invokana (canagliflozin), Farxiga (dapagliflozin), Jardiance (empagliflozin), Steglatro (ertugliflozin), Synjardy (empagliflozin/metformin)  - hold 3-4 days prior to procedure  GLP-1 Agonists: no  - Byetta (exenitide), Victoza (liraglutide), Ozempic, Wegovy, Rybelsus (semaglutide), Trulicity (dulaglutide), Mounjaro (tirzepatide), Bydureon (Exenatide ER), Adlyxin (Lixisendatide)   - (Weekly dosing, hold GLP-1 agonists 7 days before procedure)  - (Daily or BID dosing, hold GLP-1 agonists day before and day of procedure)  - (Oral semaglutide, hold 7 days before procedure due to long half-life)  Diuretic: furosemide / metalazone / spironolactone    NPO 8 hours prior to arrival time (starting at 0400)  May have clear liquids 2 hours prior to arrival time (up until 10:00)    -Shower the morning of the procedure, and then put on a clean shirt in order to help prevent infection.     -Pt to call [facility] at [phone] if pt has any new COVID like symptoms or if feeling unwell the evening prior or AM of procedure.      -Post-procedure transportation and 24 hours monitoring set up. Please call before coming in if plans change.  With limited bed availability due to COVID, overnight hospital stays will be allowed for clinical reasons only.    -No driving for 24 hours post procedure due to sedation.     Pt verbalized understanding of instructions.         Info for pre-procedure orders:    MRSA history?: no  Allergy to PCN or ancef?:  no. Reaction: na  Contrast allergy?: no    -INR check scheduled: no  (INR not needed)

## 2024-09-17 ENCOUNTER — LAB (OUTPATIENT)
Dept: LAB | Facility: CLINIC | Age: 84
End: 2024-09-17
Payer: COMMERCIAL

## 2024-09-17 DIAGNOSIS — N39.0 URINARY TRACT INFECTION WITHOUT HEMATURIA, SITE UNSPECIFIED: ICD-10-CM

## 2024-09-17 LAB
ALBUMIN UR-MCNC: NEGATIVE MG/DL
APPEARANCE UR: CLEAR
BACTERIA #/AREA URNS HPF: ABNORMAL /HPF
BILIRUB UR QL STRIP: NEGATIVE
COLOR UR AUTO: YELLOW
GLUCOSE UR STRIP-MCNC: NEGATIVE MG/DL
HGB UR QL STRIP: NEGATIVE
HYALINE CASTS #/AREA URNS LPF: ABNORMAL /LPF
KETONES UR STRIP-MCNC: NEGATIVE MG/DL
LEUKOCYTE ESTERASE UR QL STRIP: NEGATIVE
NITRATE UR QL: NEGATIVE
PH UR STRIP: 7 [PH] (ref 5–7)
RBC #/AREA URNS AUTO: ABNORMAL /HPF
SP GR UR STRIP: 1.01 (ref 1–1.03)
UROBILINOGEN UR STRIP-ACNC: 0.2 E.U./DL
WBC #/AREA URNS AUTO: ABNORMAL /HPF

## 2024-09-17 PROCEDURE — 87086 URINE CULTURE/COLONY COUNT: CPT

## 2024-09-17 PROCEDURE — 81001 URINALYSIS AUTO W/SCOPE: CPT

## 2024-09-17 NOTE — PROGRESS NOTES
Called daughter and left VM to have her call back for instructions. Device number provided  Ck PRETTY

## 2024-09-18 ENCOUNTER — VIRTUAL VISIT (OUTPATIENT)
Dept: FAMILY MEDICINE | Facility: CLINIC | Age: 84
End: 2024-09-18
Payer: COMMERCIAL

## 2024-09-18 DIAGNOSIS — R39.9 LOWER URINARY TRACT SYMPTOMS (LUTS): ICD-10-CM

## 2024-09-18 DIAGNOSIS — N39.0 RECURRENT UTI: Primary | ICD-10-CM

## 2024-09-18 LAB — BACTERIA UR CULT: NORMAL

## 2024-09-18 PROCEDURE — 99213 OFFICE O/P EST LOW 20 MIN: CPT | Mod: 95 | Performed by: INTERNAL MEDICINE

## 2024-09-18 NOTE — PROGRESS NOTES
Jaun is a 84 year old who is being evaluated via a billable video visit.    How would you like to obtain your AVS? MyChart  If the video visit is dropped, the invitation should be resent by: Text to cell phone: [phone]  Will anyone else be joining your video visit? No      Assessment & Plan   Problem List Items Addressed This Visit    None  Visit Diagnoses       Recurrent UTI    -  Primary    Relevant Orders    Adult Infectious Disease  Referral    Adult Urology  Referral    Lower urinary tract symptoms (LUTS)        Relevant Orders    Adult Urology  Referral           If recurrence of UTI will have patient see infectious disease.  Also follow-up with urology lower urinary obstructive symptoms can predispose to recurrent UTIs.  He had seen urology in the past he will call to schedule.  Keep well-hydrated.  Currently he is asymptomatic.  All questions answered.  Addressed acute illness reviewed lab results and virtual visit.       See Patient Instructions      Subjective   Jaun is a 84 year old, presenting for the following health issues:  Results        8/9/2024     8:40 AM   Additional Questions   Roomed by Inderjit   Accompanied by TARUN       Video Start Time:  5:11 PM    History of Present Illness       Reason for visit:  Infection follow up    He eats 4 or more servings of fruits and vegetables daily.He consumes 2 sweetened beverage(s) daily.He exercises with enough effort to increase his heart rate 10 to 19 minutes per day.  He exercises with enough effort to increase his heart rate 5 days per week.   He is taking medications regularly.       Patient presented for follow-up.  Has no particular concerns.  Was recently treated with nitrofurantoin for staph epidermis UTI suspected.  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  Denies any ongoing dysuria perineal pain hematuria flank pain fever chills lethargy vomiting or other systemic complaints.    Review of  Systems  Constitutional, HEENT, cardiovascular, pulmonary, gi and gu systems are negative, except as otherwise noted.      Objective           Vitals:  No vitals were obtained today due to virtual visit.    Physical Exam   GENERAL: alert and no distress  EYES: Eyes grossly normal to inspection.  No discharge or erythema, or obvious scleral/conjunctival abnormalities.  RESP: No audible wheeze, cough, or visible cyanosis.    SKIN: Visible skin clear. No significant rash, abnormal pigmentation or lesions.  NEURO: Cranial nerves grossly intact.  Mentation and speech appropriate for age.  PSYCH: Appropriate affect, tone, and pace of words    Lab on 09/17/2024   Component Date Value Ref Range Status    Color Urine 09/17/2024 Yellow  Colorless, Straw, Light Yellow, Yellow Final    Appearance Urine 09/17/2024 Clear  Clear Final    Glucose Urine 09/17/2024 Negative  Negative mg/dL Final    Bilirubin Urine 09/17/2024 Negative  Negative Final    Ketones Urine 09/17/2024 Negative  Negative mg/dL Final    Specific Gravity Urine 09/17/2024 1.015  1.003 - 1.035 Final    Blood Urine 09/17/2024 Negative  Negative Final    pH Urine 09/17/2024 7.0  5.0 - 7.0 Final    Protein Albumin Urine 09/17/2024 Negative  Negative mg/dL Final    Urobilinogen Urine 09/17/2024 0.2  0.2, 1.0 E.U./dL Final    Nitrite Urine 09/17/2024 Negative  Negative Final    Leukocyte Esterase Urine 09/17/2024 Negative  Negative Final    Culture 09/17/2024 <10,000 CFU/mL Mixture of Urogenital Wendy   Final    Bacteria Urine 09/17/2024 Few (A)  None Seen /HPF Final    RBC Urine 09/17/2024 0-2  0-2 /HPF /HPF Final    WBC Urine 09/17/2024 0-5  0-5 /HPF /HPF Final    Hyaline Casts Urine 09/17/2024 2-5 (A)  None Seen /LPF Final         Video-Visit Details    Type of service:  Video Visit   Video End Time: 5:24 PM  Originating Location (pt. Location): Home    Distant Location (provider location):  On-site  Platform used for Video Visit: Quintin  Signed Electronically by:  Anne Heath MD

## 2024-09-23 ENCOUNTER — HOSPITAL ENCOUNTER (OUTPATIENT)
Facility: CLINIC | Age: 84
Discharge: HOME | End: 2024-09-23
Attending: INTERNAL MEDICINE | Admitting: INTERNAL MEDICINE
Payer: MEDICARE

## 2024-09-23 VITALS
RESPIRATION RATE: 15 BRPM | HEIGHT: 72 IN | DIASTOLIC BLOOD PRESSURE: 77 MMHG | OXYGEN SATURATION: 94 % | SYSTOLIC BLOOD PRESSURE: 132 MMHG | WEIGHT: 253 LBS | TEMPERATURE: 97.8 F | BODY MASS INDEX: 34.27 KG/M2 | HEART RATE: 65 BPM

## 2024-09-23 DIAGNOSIS — I48.0 PAROXYSMAL ATRIAL FIBRILLATION (H): ICD-10-CM

## 2024-09-23 DIAGNOSIS — I49.5 SICK SINUS SYNDROME (H): ICD-10-CM

## 2024-09-23 DIAGNOSIS — Z45.010 PACEMAKER AT END OF BATTERY LIFE: ICD-10-CM

## 2024-09-23 DIAGNOSIS — I50.32 CHRONIC DIASTOLIC HEART FAILURE (H): ICD-10-CM

## 2024-09-23 DIAGNOSIS — Z95.0 CARDIAC PACEMAKER IN SITU: ICD-10-CM

## 2024-09-23 LAB
ANION GAP SERPL CALCULATED.3IONS-SCNC: 11 MMOL/L (ref 7–15)
BUN SERPL-MCNC: 25.9 MG/DL (ref 8–23)
CALCIUM SERPL-MCNC: 10 MG/DL (ref 8.8–10.4)
CHLORIDE SERPL-SCNC: 98 MMOL/L (ref 98–107)
CREAT SERPL-MCNC: 1.09 MG/DL (ref 0.67–1.17)
EGFRCR SERPLBLD CKD-EPI 2021: 67 ML/MIN/1.73M2
ERYTHROCYTE [DISTWIDTH] IN BLOOD BY AUTOMATED COUNT: 14.6 % (ref 10–15)
GLUCOSE BLDC GLUCOMTR-MCNC: 99 MG/DL (ref 70–99)
GLUCOSE SERPL-MCNC: 93 MG/DL (ref 70–99)
HCO3 SERPL-SCNC: 28 MMOL/L (ref 22–29)
HCT VFR BLD AUTO: 39.7 % (ref 40–53)
HGB BLD-MCNC: 13.2 G/DL (ref 13.3–17.7)
MCH RBC QN AUTO: 33.2 PG (ref 26.5–33)
MCHC RBC AUTO-ENTMCNC: 33.2 G/DL (ref 31.5–36.5)
MCV RBC AUTO: 100 FL (ref 78–100)
PLATELET # BLD AUTO: 200 10E3/UL (ref 150–450)
POTASSIUM SERPL-SCNC: 4.2 MMOL/L (ref 3.4–5.3)
RBC # BLD AUTO: 3.98 10E6/UL (ref 4.4–5.9)
SODIUM SERPL-SCNC: 137 MMOL/L (ref 135–145)
WBC # BLD AUTO: 9.5 10E3/UL (ref 4–11)

## 2024-09-23 PROCEDURE — 33228 REMV&REPLC PM GEN DUAL LEAD: CPT | Performed by: INTERNAL MEDICINE

## 2024-09-23 PROCEDURE — 258N000002 HC RX IP 258 OP 250: Performed by: INTERNAL MEDICINE

## 2024-09-23 PROCEDURE — 36415 COLL VENOUS BLD VENIPUNCTURE: CPT | Performed by: INTERNAL MEDICINE

## 2024-09-23 PROCEDURE — 272N000001 HC OR GENERAL SUPPLY STERILE: Performed by: INTERNAL MEDICINE

## 2024-09-23 PROCEDURE — 82947 ASSAY GLUCOSE BLOOD QUANT: CPT | Performed by: INTERNAL MEDICINE

## 2024-09-23 PROCEDURE — 250N000009 HC RX 250: Performed by: INTERNAL MEDICINE

## 2024-09-23 PROCEDURE — 80048 BASIC METABOLIC PNL TOTAL CA: CPT | Performed by: INTERNAL MEDICINE

## 2024-09-23 PROCEDURE — 250N000011 HC RX IP 250 OP 636: Performed by: INTERNAL MEDICINE

## 2024-09-23 PROCEDURE — 999N000071 HC STATISTIC HEART CATH LAB OR EP LAB

## 2024-09-23 PROCEDURE — C1785 PMKR, DUAL, RATE-RESP: HCPCS | Performed by: INTERNAL MEDICINE

## 2024-09-23 PROCEDURE — 82962 GLUCOSE BLOOD TEST: CPT

## 2024-09-23 PROCEDURE — 85014 HEMATOCRIT: CPT | Performed by: INTERNAL MEDICINE

## 2024-09-23 PROCEDURE — 99152 MOD SED SAME PHYS/QHP 5/>YRS: CPT | Performed by: INTERNAL MEDICINE

## 2024-09-23 DEVICE — PCMKR CARD ACCOLADE MRI EL DR: Type: IMPLANTABLE DEVICE | Status: FUNCTIONAL

## 2024-09-23 RX ORDER — CEFAZOLIN SODIUM 2 G/100ML
2 INJECTION, SOLUTION INTRAVENOUS
Status: COMPLETED | OUTPATIENT
Start: 2024-09-23 | End: 2024-09-23

## 2024-09-23 RX ORDER — NALOXONE HYDROCHLORIDE 0.4 MG/ML
0.2 INJECTION, SOLUTION INTRAMUSCULAR; INTRAVENOUS; SUBCUTANEOUS
Status: DISCONTINUED | OUTPATIENT
Start: 2024-09-23 | End: 2024-09-23 | Stop reason: HOSPADM

## 2024-09-23 RX ORDER — NALOXONE HYDROCHLORIDE 0.4 MG/ML
0.4 INJECTION, SOLUTION INTRAMUSCULAR; INTRAVENOUS; SUBCUTANEOUS
Status: DISCONTINUED | OUTPATIENT
Start: 2024-09-23 | End: 2024-09-23 | Stop reason: HOSPADM

## 2024-09-23 RX ORDER — FENTANYL CITRATE 50 UG/ML
INJECTION, SOLUTION INTRAMUSCULAR; INTRAVENOUS
Status: DISCONTINUED | OUTPATIENT
Start: 2024-09-23 | End: 2024-09-23 | Stop reason: HOSPADM

## 2024-09-23 RX ORDER — OXYCODONE AND ACETAMINOPHEN 5; 325 MG/1; MG/1
1 TABLET ORAL EVERY 4 HOURS PRN
Status: DISCONTINUED | OUTPATIENT
Start: 2024-09-23 | End: 2024-09-23 | Stop reason: HOSPADM

## 2024-09-23 RX ORDER — SODIUM CHLORIDE 450 MG/100ML
INJECTION, SOLUTION INTRAVENOUS CONTINUOUS
Status: DISCONTINUED | OUTPATIENT
Start: 2024-09-23 | End: 2024-09-23 | Stop reason: HOSPADM

## 2024-09-23 RX ORDER — LIDOCAINE 40 MG/G
CREAM TOPICAL
Status: DISCONTINUED | OUTPATIENT
Start: 2024-09-23 | End: 2024-09-23 | Stop reason: HOSPADM

## 2024-09-23 RX ADMIN — CEFAZOLIN SODIUM 2 G: 2 INJECTION, SOLUTION INTRAVENOUS at 13:38

## 2024-09-23 RX ADMIN — SODIUM CHLORIDE: 4.5 INJECTION, SOLUTION INTRAVENOUS at 12:49

## 2024-09-23 ASSESSMENT — ACTIVITIES OF DAILY LIVING (ADL)
ADLS_ACUITY_SCORE: 38
ADLS_ACUITY_SCORE: 36
ADLS_ACUITY_SCORE: 38

## 2024-09-23 NOTE — DISCHARGE INSTRUCTIONS
Pacemaker/ICD Generator Change Discharge Instructions    After you go home:    Have an adult stay with you until tomorrow.  You may resume your normal diet.       For 24 hours - due to the sedation you received:  Relax and take it easy.  Do NOT make any important or legal decisions.  Do NOT drive or operate machines at home or at work.  Do NOT drink alcohol.    Care of Chest Incision:    Keep the dressing on at least 1 week. The dressing will be removed by the device nurse at your return appointment.   If there is a pressure dressing (gauze & tape) - 24 hours after your procedure you may remove ONLY the top dressing. Leave the bottom dressing on.  Leave the strips of tape on. They will fall off on their own.  Check your wound daily for signs of infection, such as increased redness, severe swelling or draining. Fever may also be a sign of infection. Call us if you see any of these signs.  You may shower with the Aquacel dressing in place as it is waterproof.  Do not scrub on top of the dressing & avoid peeling the dressing off. If you take a tub bath, keep the dressing dry.  No soaking the incision (swimming pool, bathtub, hot tub) for 2 weeks.  You may have mild to medium pain for 3 to 5 days. Take Acetaminophen (Tylenol) or Ibuprofen (Advil) for the pain. If the pain persists or is severe, call us.    Activity:    You can begin to use your arm as it feels comfortable to you.  No driving for one day & limit to necessary driving for one week.    Bleeding:    If you start bleeding from the incision site, sit down and press firmly on the site for 10 minutes.   Once bleeding stops, call [facility] as soon as you can.       Call 911 right away if you have heavy bleeding or bleeding that does not stop.      Medicines:    Take your medications, including blood thinners, unless your provider tells you not to.  If you have stopped any other medicines, check with your provider about when to restart them.    Follow Up  Appointments:    Follow up with Device Clinic at [facility] of patient preference in 7-10 days.    Call the clinic if:    You have a large or growing hard lump around the site.  The site is red, swollen, hot or tender.  Blood or fluid is draining from the site.  You have chills or a fever greater than 101 F (38 C).  You feel dizzy or light-headed.  Questions or concerns.      Telling others about your device:    Before you leave the hospital, you will receive a temporary ID card. A permanent card will be mailed to you about 6 to 8 weeks later. Always carry the ID card with you. It has important details about your device.  You may also get a Medical Alert bracelet or tag that says you have a pacemaker or a defibrillator (ICD).  Go to [email or web address].   Always tell doctors, dentists and other care providers that you have a device implanted in you.  Let us know before you plan any surgeries. Your care team must take special steps to keep you safe during certain procedures. These steps will depend on the type of device you have. Your provider will need to see your ID card. They may need to call us for instructions.    Device Safety:    Please refer to device  s booklet for further information.        [facility] [place]    [phone]  Device Clinic    Or you may contact your provider via My Chart

## 2024-09-23 NOTE — PROGRESS NOTES
[facility] Post Procedure Note    Patient Information  Name: Erich Craven  Age: 84 year old    Post Procedure  Time patient returned to [facility]: 1425  Concerns/abnormal assessment: none  If abnormal assessment, provider notified: N/A  Plan/Other: Recovery from sedation, close monitoring of VS and pacemaker incision site as ordered, discharge education, bedrest x 1 hours, being picked up by granddaughter around 1447-3462 if stable.      Arlen Darnell RN

## 2024-09-23 NOTE — PROGRESS NOTES
[facility] Admission Nursing Note    Patient Information  Name: Erich Craven  Age: 84 year old  Reason for admission: generator change   [facility] arrival time: 1200    Visitor Information  Name: none     Patient Admission/Assessment   Pre-procedure assessment complete: Yes  If abnormal assessment/labs, provider notified: N/A  NPO: Yes  Medications held per instructions/orders: Yes  Consent: deferred  If applicable, pregnancy test status: deferred  Patient oriented to room: Yes  Education/questions answered: Yes  Plan/other: generator change     Discharge Planning  Discharge name/phone number: adan [phone]   Overnight post sedation caregiver: adan  Discharge location: home    Serene Garcia RN

## 2024-09-23 NOTE — PROGRESS NOTES
[facility] Discharge Nursing Note    Patient Information  Name: Erich Craven  Age: 84 year old    Discharge Education:  Discharge instructions reviewed: Yes  Additional education/resources provided: device monitor, booklet and card given by [employer] rep  Patient/patient representative verbalizes understanding: Yes  Patient discharging on new medications: No  Medication education completed: N/A    Discharge Plans:   Discharge location: home  Discharge ride contacted: Yes  Approximate discharge time: 1605    Discharge Criteria:  Discharge criteria met and vital signs stable: Yes    Patient Belongs:  Patient belongings returned to patient: Yes    Arlen Darnell RN

## 2024-09-23 NOTE — Clinical Note
Airway       Patient location during procedure: OR       Procedure Start/Stop Times: 12/14/2022 7:15 AM  Staff -        Performed By: CRNAIndications and Patient Condition       Indications for airway management: adair-procedural       Induction type:intravenous       Mask difficulty assessment: 1 - vent by mask    Final Airway Details       Final airway type: endotracheal airway       Successful airway: ETT - single and Oral  Endotracheal Airway Details        ETT size (mm): 7.0       Successful intubation technique: direct laryngoscopy       DL Blade Type: MAC 3       Adjucts: stylet       Position: Right    Post intubation assessment        Placement verified by: capnometry, equal breath sounds and chest rise        Number of attempts at approach: 1       Secured with: plastic tape       Ease of procedure: easy       Dentition: Intact and Unchanged    Medication(s) Administered   Medication Administration Time: 12/14/2022 7:15 AM       Generator attached

## 2024-09-24 ENCOUNTER — TELEPHONE (OUTPATIENT)
Dept: CARDIOLOGY | Facility: CLINIC | Age: 84
End: 2024-09-24
Payer: MEDICARE

## 2024-09-24 NOTE — TELEPHONE ENCOUNTER
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 Home after recovering from sedation     Reviewed the following:     - If there is a pressure dressing in place, this can be removed after 24 hours.   - Leave Aquacel dressing in place.  Okay to shower the day after the procedure.  If this begins to peel up, contact the device clinic.    - Aquacel dressing and steri-strips will be removed at 1 week device check, as appropriate  - Watch for redness, drainage, warmth, or fever. Call device clinic if any signs of infection.   - No soaking in bath tub, swimming pool or hot tub until you are cleared at your 6-8 week check    1 week device check scheduled: 10/1/24    Pt states understanding of all instructions and will call with any questions.  Device clinic phone number provided ([phone]).

## 2024-10-01 ENCOUNTER — ANCILLARY PROCEDURE (OUTPATIENT)
Dept: CARDIOLOGY | Facility: CLINIC | Age: 84
End: 2024-10-01
Attending: INTERNAL MEDICINE
Payer: COMMERCIAL

## 2024-10-01 DIAGNOSIS — I49.5 SICK SINUS SYNDROME (H): Primary | ICD-10-CM

## 2024-10-01 DIAGNOSIS — Z95.0 CARDIAC PACEMAKER IN SITU: ICD-10-CM

## 2024-10-01 DIAGNOSIS — Z45.010 PACEMAKER AT END OF BATTERY LIFE: ICD-10-CM

## 2024-10-01 PROCEDURE — 93280 PM DEVICE PROGR EVAL DUAL: CPT | Performed by: INTERNAL MEDICINE

## 2024-10-04 LAB
MDC_IDC_LEAD_CONNECTION_STATUS: NORMAL
MDC_IDC_LEAD_CONNECTION_STATUS: NORMAL
MDC_IDC_LEAD_IMPLANT_DT: NORMAL
MDC_IDC_LEAD_IMPLANT_DT: NORMAL
MDC_IDC_LEAD_LOCATION: NORMAL
MDC_IDC_LEAD_LOCATION: NORMAL
MDC_IDC_LEAD_LOCATION_DETAIL_1: NORMAL
MDC_IDC_LEAD_LOCATION_DETAIL_1: NORMAL
MDC_IDC_LEAD_MFG: NORMAL
MDC_IDC_LEAD_MFG: NORMAL
MDC_IDC_LEAD_MODEL: NORMAL
MDC_IDC_LEAD_MODEL: NORMAL
MDC_IDC_LEAD_POLARITY_TYPE: NORMAL
MDC_IDC_LEAD_POLARITY_TYPE: NORMAL
MDC_IDC_LEAD_SERIAL: NORMAL
MDC_IDC_LEAD_SERIAL: NORMAL
MDC_IDC_MSMT_BATTERY_DTM: NORMAL
MDC_IDC_MSMT_BATTERY_REMAINING_LONGEVITY: 174 MO
MDC_IDC_MSMT_BATTERY_REMAINING_PERCENTAGE: 100 %
MDC_IDC_MSMT_BATTERY_STATUS: NORMAL
MDC_IDC_MSMT_LEADCHNL_RA_IMPEDANCE_VALUE: 438 OHM
MDC_IDC_MSMT_LEADCHNL_RA_PACING_THRESHOLD_AMPLITUDE: 0.3 V
MDC_IDC_MSMT_LEADCHNL_RA_PACING_THRESHOLD_PULSEWIDTH: 0.4 MS
MDC_IDC_MSMT_LEADCHNL_RV_IMPEDANCE_VALUE: 407 OHM
MDC_IDC_MSMT_LEADCHNL_RV_LEAD_CHANNEL_STATUS: NORMAL
MDC_IDC_MSMT_LEADCHNL_RV_PACING_THRESHOLD_AMPLITUDE: 1.6 V
MDC_IDC_MSMT_LEADCHNL_RV_PACING_THRESHOLD_PULSEWIDTH: 0.5 MS
MDC_IDC_PG_IMPLANT_DTM: NORMAL
MDC_IDC_PG_MFG: NORMAL
MDC_IDC_PG_MODEL: NORMAL
MDC_IDC_PG_SERIAL: NORMAL
MDC_IDC_PG_TYPE: NORMAL
MDC_IDC_SESS_CLINIC_NAME: NORMAL
MDC_IDC_SESS_DTM: NORMAL
MDC_IDC_SESS_TYPE: NORMAL
MDC_IDC_SET_BRADY_AT_MODE_SWITCH_MODE: NORMAL
MDC_IDC_SET_BRADY_AT_MODE_SWITCH_RATE: 150 {BEATS}/MIN
MDC_IDC_SET_BRADY_LOWRATE: 60 {BEATS}/MIN
MDC_IDC_SET_BRADY_MAX_SENSOR_RATE: 130 {BEATS}/MIN
MDC_IDC_SET_BRADY_MAX_TRACKING_RATE: 130 {BEATS}/MIN
MDC_IDC_SET_BRADY_MODE: NORMAL
MDC_IDC_SET_BRADY_PAV_DELAY_HIGH: 220 MS
MDC_IDC_SET_BRADY_PAV_DELAY_LOW: 250 MS
MDC_IDC_SET_BRADY_SAV_DELAY_HIGH: 220 MS
MDC_IDC_SET_BRADY_SAV_DELAY_LOW: 250 MS
MDC_IDC_SET_LEADCHNL_RA_PACING_AMPLITUDE: 2 V
MDC_IDC_SET_LEADCHNL_RA_PACING_CAPTURE_MODE: NORMAL
MDC_IDC_SET_LEADCHNL_RA_PACING_POLARITY: NORMAL
MDC_IDC_SET_LEADCHNL_RA_PACING_PULSEWIDTH: 0.4 MS
MDC_IDC_SET_LEADCHNL_RA_SENSING_ADAPTATION_MODE: NORMAL
MDC_IDC_SET_LEADCHNL_RA_SENSING_POLARITY: NORMAL
MDC_IDC_SET_LEADCHNL_RA_SENSING_SENSITIVITY: 0.5 MV
MDC_IDC_SET_LEADCHNL_RV_PACING_AMPLITUDE: 2.6 V
MDC_IDC_SET_LEADCHNL_RV_PACING_CAPTURE_MODE: NORMAL
MDC_IDC_SET_LEADCHNL_RV_PACING_POLARITY: NORMAL
MDC_IDC_SET_LEADCHNL_RV_PACING_PULSEWIDTH: 1 MS
MDC_IDC_SET_LEADCHNL_RV_SENSING_ADAPTATION_MODE: NORMAL
MDC_IDC_SET_LEADCHNL_RV_SENSING_POLARITY: NORMAL
MDC_IDC_SET_LEADCHNL_RV_SENSING_SENSITIVITY: 1 MV
MDC_IDC_SET_ZONE_DETECTION_INTERVAL: 375 MS
MDC_IDC_SET_ZONE_STATUS: NORMAL
MDC_IDC_SET_ZONE_TYPE: NORMAL
MDC_IDC_SET_ZONE_VENDOR_TYPE: NORMAL
MDC_IDC_STAT_AT_BURDEN_PERCENT: 0 %
MDC_IDC_STAT_AT_DTM_END: NORMAL
MDC_IDC_STAT_AT_DTM_START: NORMAL
MDC_IDC_STAT_BRADY_DTM_END: NORMAL
MDC_IDC_STAT_BRADY_DTM_START: NORMAL
MDC_IDC_STAT_BRADY_RA_PERCENT_PACED: 16 %
MDC_IDC_STAT_BRADY_RV_PERCENT_PACED: 0 %
MDC_IDC_STAT_EPISODE_RECENT_COUNT: 0
MDC_IDC_STAT_EPISODE_RECENT_COUNT_DTM_END: NORMAL
MDC_IDC_STAT_EPISODE_RECENT_COUNT_DTM_START: NORMAL
MDC_IDC_STAT_EPISODE_TYPE: NORMAL
MDC_IDC_STAT_EPISODE_VENDOR_TYPE: NORMAL
MDC_IDC_STAT_EPISODE_VENDOR_TYPE: NORMAL

## 2024-10-05 DIAGNOSIS — F41.9 ANXIETY: ICD-10-CM

## 2024-10-07 RX ORDER — VENLAFAXINE 75 MG/1
TABLET ORAL
Qty: 180 TABLET | Refills: 0 | Status: SHIPPED | OUTPATIENT
Start: 2024-10-07

## 2024-10-18 ENCOUNTER — APPOINTMENT (OUTPATIENT)
Dept: CT IMAGING | Facility: CLINIC | Age: 84
End: 2024-10-18
Attending: EMERGENCY MEDICINE
Payer: MEDICARE

## 2024-10-18 ENCOUNTER — APPOINTMENT (OUTPATIENT)
Dept: GENERAL RADIOLOGY | Facility: CLINIC | Age: 84
End: 2024-10-18
Attending: INTERNAL MEDICINE
Payer: MEDICARE

## 2024-10-18 ENCOUNTER — HOSPITAL ENCOUNTER (OUTPATIENT)
Facility: CLINIC | Age: 84
Setting detail: OBSERVATION
Discharge: HOME | End: 2024-10-20
Attending: EMERGENCY MEDICINE | Admitting: INTERNAL MEDICINE
Payer: MEDICARE

## 2024-10-18 ENCOUNTER — NURSE TRIAGE (OUTPATIENT)
Dept: FAMILY MEDICINE | Facility: CLINIC | Age: 84
End: 2024-10-18
Payer: MEDICARE

## 2024-10-18 ENCOUNTER — APPOINTMENT (OUTPATIENT)
Dept: GENERAL RADIOLOGY | Facility: CLINIC | Age: 84
End: 2024-10-18
Attending: EMERGENCY MEDICINE
Payer: MEDICARE

## 2024-10-18 ENCOUNTER — DOCUMENTATION ONLY (OUTPATIENT)
Dept: CARDIOLOGY | Facility: CLINIC | Age: 84
End: 2024-10-18

## 2024-10-18 DIAGNOSIS — R42 ORTHOSTATIC DIZZINESS: ICD-10-CM

## 2024-10-18 DIAGNOSIS — R79.89 ELEVATED TROPONIN: ICD-10-CM

## 2024-10-18 DIAGNOSIS — N17.9 AKI (ACUTE KIDNEY INJURY) (H): ICD-10-CM

## 2024-10-18 DIAGNOSIS — E03.9 HYPOTHYROIDISM, UNSPECIFIED TYPE: ICD-10-CM

## 2024-10-18 DIAGNOSIS — R06.09 DOE (DYSPNEA ON EXERTION): ICD-10-CM

## 2024-10-18 LAB
ALBUMIN SERPL BCG-MCNC: 4.2 G/DL (ref 3.5–5.2)
ALP SERPL-CCNC: 125 U/L (ref 40–150)
ALT SERPL W P-5'-P-CCNC: 26 U/L (ref 0–70)
ANION GAP SERPL CALCULATED.3IONS-SCNC: 11 MMOL/L (ref 7–15)
AST SERPL W P-5'-P-CCNC: 31 U/L (ref 0–45)
ATRIAL RATE - MUSE: 66 BPM
BASOPHILS # BLD AUTO: 0.1 10E3/UL (ref 0–0.2)
BASOPHILS NFR BLD AUTO: 1 %
BILIRUB SERPL-MCNC: 0.3 MG/DL
BUN SERPL-MCNC: 27.6 MG/DL (ref 8–23)
CALCIUM SERPL-MCNC: 9.5 MG/DL (ref 8.8–10.4)
CHLORIDE SERPL-SCNC: 94 MMOL/L (ref 98–107)
CREAT SERPL-MCNC: 1.39 MG/DL (ref 0.67–1.17)
CRP SERPL-MCNC: <3 MG/L
DIASTOLIC BLOOD PRESSURE - MUSE: NORMAL MMHG
EGFRCR SERPLBLD CKD-EPI 2021: 50 ML/MIN/1.73M2
EOSINOPHIL # BLD AUTO: 0.1 10E3/UL (ref 0–0.7)
EOSINOPHIL NFR BLD AUTO: 1 %
ERYTHROCYTE [DISTWIDTH] IN BLOOD BY AUTOMATED COUNT: 14.7 % (ref 10–15)
FLUAV RNA SPEC QL NAA+PROBE: NEGATIVE
FLUBV RNA RESP QL NAA+PROBE: NEGATIVE
GLUCOSE SERPL-MCNC: 84 MG/DL (ref 70–99)
HCO3 SERPL-SCNC: 30 MMOL/L (ref 22–29)
HCT VFR BLD AUTO: 37.8 % (ref 40–53)
HGB BLD-MCNC: 12.4 G/DL (ref 13.3–17.7)
HOLD SPECIMEN: NORMAL
HOLD SPECIMEN: NORMAL
IMM GRANULOCYTES # BLD: 0.1 10E3/UL
IMM GRANULOCYTES NFR BLD: 1 %
INTERPRETATION ECG - MUSE: NORMAL
LYMPHOCYTES # BLD AUTO: 2.1 10E3/UL (ref 0.8–5.3)
LYMPHOCYTES NFR BLD AUTO: 23 %
MCH RBC QN AUTO: 32 PG (ref 26.5–33)
MCHC RBC AUTO-ENTMCNC: 32.8 G/DL (ref 31.5–36.5)
MCV RBC AUTO: 97 FL (ref 78–100)
MONOCYTES # BLD AUTO: 0.7 10E3/UL (ref 0–1.3)
MONOCYTES NFR BLD AUTO: 7 %
NEUTROPHILS # BLD AUTO: 6.5 10E3/UL (ref 1.6–8.3)
NEUTROPHILS NFR BLD AUTO: 68 %
NRBC # BLD AUTO: 0 10E3/UL
NRBC BLD AUTO-RTO: 0 /100
NT-PROBNP SERPL-MCNC: <36 PG/ML (ref 0–1800)
P AXIS - MUSE: 72 DEGREES
PLATELET # BLD AUTO: 186 10E3/UL (ref 150–450)
POTASSIUM SERPL-SCNC: 4.1 MMOL/L (ref 3.4–5.3)
PR INTERVAL - MUSE: 212 MS
PROCALCITONIN SERPL IA-MCNC: 0.06 NG/ML
PROT SERPL-MCNC: 7 G/DL (ref 6.4–8.3)
QRS DURATION - MUSE: 90 MS
QT - MUSE: 392 MS
QTC - MUSE: 410 MS
R AXIS - MUSE: 103 DEGREES
RBC # BLD AUTO: 3.88 10E6/UL (ref 4.4–5.9)
RSV RNA SPEC NAA+PROBE: NEGATIVE
SARS-COV-2 RNA RESP QL NAA+PROBE: NEGATIVE
SODIUM SERPL-SCNC: 135 MMOL/L (ref 135–145)
SYSTOLIC BLOOD PRESSURE - MUSE: NORMAL MMHG
T AXIS - MUSE: 83 DEGREES
TROPONIN T SERPL HS-MCNC: 55 NG/L
TROPONIN T SERPL HS-MCNC: 60 NG/L
VENTRICULAR RATE- MUSE: 66 BPM
WBC # BLD AUTO: 9.5 10E3/UL (ref 4–11)

## 2024-10-18 PROCEDURE — 36415 COLL VENOUS BLD VENIPUNCTURE: CPT | Performed by: EMERGENCY MEDICINE

## 2024-10-18 PROCEDURE — 250N000013 HC RX MED GY IP 250 OP 250 PS 637: Performed by: INTERNAL MEDICINE

## 2024-10-18 PROCEDURE — 72080 X-RAY EXAM THORACOLMB 2/> VW: CPT

## 2024-10-18 PROCEDURE — 71046 X-RAY EXAM CHEST 2 VIEWS: CPT

## 2024-10-18 PROCEDURE — 80053 COMPREHEN METABOLIC PANEL: CPT | Performed by: EMERGENCY MEDICINE

## 2024-10-18 PROCEDURE — 99285 EMERGENCY DEPT VISIT HI MDM: CPT | Mod: 25

## 2024-10-18 PROCEDURE — G0378 HOSPITAL OBSERVATION PER HR: HCPCS

## 2024-10-18 PROCEDURE — 93005 ELECTROCARDIOGRAM TRACING: CPT

## 2024-10-18 PROCEDURE — 71250 CT THORAX DX C-: CPT | Mod: MA

## 2024-10-18 PROCEDURE — 85025 COMPLETE CBC W/AUTO DIFF WBC: CPT | Performed by: EMERGENCY MEDICINE

## 2024-10-18 PROCEDURE — 258N000003 HC RX IP 258 OP 636: Performed by: EMERGENCY MEDICINE

## 2024-10-18 PROCEDURE — 83880 ASSAY OF NATRIURETIC PEPTIDE: CPT | Performed by: EMERGENCY MEDICINE

## 2024-10-18 PROCEDURE — 84145 PROCALCITONIN (PCT): CPT | Performed by: EMERGENCY MEDICINE

## 2024-10-18 PROCEDURE — 86140 C-REACTIVE PROTEIN: CPT | Performed by: EMERGENCY MEDICINE

## 2024-10-18 PROCEDURE — 258N000003 HC RX IP 258 OP 636: Performed by: INTERNAL MEDICINE

## 2024-10-18 PROCEDURE — 84484 ASSAY OF TROPONIN QUANT: CPT | Performed by: EMERGENCY MEDICINE

## 2024-10-18 PROCEDURE — 87637 SARSCOV2&INF A&B&RSV AMP PRB: CPT | Performed by: EMERGENCY MEDICINE

## 2024-10-18 PROCEDURE — 99223 1ST HOSP IP/OBS HIGH 75: CPT | Mod: AI | Performed by: INTERNAL MEDICINE

## 2024-10-18 RX ORDER — ONDANSETRON 2 MG/ML
4 INJECTION INTRAMUSCULAR; INTRAVENOUS EVERY 6 HOURS PRN
Status: DISCONTINUED | OUTPATIENT
Start: 2024-10-18 | End: 2024-10-20 | Stop reason: HOSPADM

## 2024-10-18 RX ORDER — ASPIRIN 81 MG/1
81 TABLET, CHEWABLE ORAL DAILY
Status: DISCONTINUED | OUTPATIENT
Start: 2024-10-19 | End: 2024-10-20 | Stop reason: HOSPADM

## 2024-10-18 RX ORDER — ALBUTEROL SULFATE 90 UG/1
2 INHALANT RESPIRATORY (INHALATION) EVERY 6 HOURS PRN
Status: DISCONTINUED | OUTPATIENT
Start: 2024-10-18 | End: 2024-10-20 | Stop reason: HOSPADM

## 2024-10-18 RX ORDER — AMOXICILLIN 250 MG
2 CAPSULE ORAL 2 TIMES DAILY PRN
Status: DISCONTINUED | OUTPATIENT
Start: 2024-10-18 | End: 2024-10-20 | Stop reason: HOSPADM

## 2024-10-18 RX ORDER — TAMSULOSIN HYDROCHLORIDE 0.4 MG/1
0.4 CAPSULE ORAL DAILY
Status: DISCONTINUED | OUTPATIENT
Start: 2024-10-18 | End: 2024-10-19

## 2024-10-18 RX ORDER — ONDANSETRON 4 MG/1
4 TABLET, ORALLY DISINTEGRATING ORAL EVERY 6 HOURS PRN
Status: DISCONTINUED | OUTPATIENT
Start: 2024-10-18 | End: 2024-10-20 | Stop reason: HOSPADM

## 2024-10-18 RX ORDER — ATORVASTATIN CALCIUM 40 MG/1
80 TABLET, FILM COATED ORAL EVERY EVENING
Status: DISCONTINUED | OUTPATIENT
Start: 2024-10-18 | End: 2024-10-20 | Stop reason: HOSPADM

## 2024-10-18 RX ORDER — ALBUTEROL SULFATE 90 UG/1
2 INHALANT RESPIRATORY (INHALATION) EVERY 6 HOURS PRN
COMMUNITY
End: 2024-11-12

## 2024-10-18 RX ORDER — LORAZEPAM 0.5 MG/1
0.25 TABLET ORAL EVERY 8 HOURS PRN
Status: DISCONTINUED | OUTPATIENT
Start: 2024-10-18 | End: 2024-10-20 | Stop reason: HOSPADM

## 2024-10-18 RX ORDER — ACETAMINOPHEN 500 MG
1000 TABLET ORAL 3 TIMES DAILY
Status: DISCONTINUED | OUTPATIENT
Start: 2024-10-18 | End: 2024-10-20 | Stop reason: HOSPADM

## 2024-10-18 RX ORDER — VENLAFAXINE 37.5 MG/1
75 TABLET ORAL 2 TIMES DAILY
Status: DISCONTINUED | OUTPATIENT
Start: 2024-10-18 | End: 2024-10-20 | Stop reason: HOSPADM

## 2024-10-18 RX ORDER — ISOSORBIDE MONONITRATE 30 MG/1
30 TABLET, EXTENDED RELEASE ORAL DAILY
Status: CANCELLED | OUTPATIENT
Start: 2024-10-18

## 2024-10-18 RX ORDER — FLUTICASONE FUROATE AND VILANTEROL 200; 25 UG/1; UG/1
1 POWDER RESPIRATORY (INHALATION) DAILY
Status: DISCONTINUED | OUTPATIENT
Start: 2024-10-19 | End: 2024-10-20 | Stop reason: HOSPADM

## 2024-10-18 RX ORDER — AMOXICILLIN 250 MG
1 CAPSULE ORAL 2 TIMES DAILY PRN
Status: DISCONTINUED | OUTPATIENT
Start: 2024-10-18 | End: 2024-10-20 | Stop reason: HOSPADM

## 2024-10-18 RX ADMIN — SODIUM CHLORIDE 500 ML: 9 INJECTION, SOLUTION INTRAVENOUS at 16:54

## 2024-10-18 RX ADMIN — LORAZEPAM 0.25 MG: 0.5 TABLET ORAL at 19:45

## 2024-10-18 RX ADMIN — VENLAFAXINE 75 MG: 37.5 TABLET ORAL at 19:45

## 2024-10-18 RX ADMIN — TAMSULOSIN HYDROCHLORIDE 0.4 MG: 0.4 CAPSULE ORAL at 18:50

## 2024-10-18 RX ADMIN — SODIUM CHLORIDE 1000 ML: 9 INJECTION, SOLUTION INTRAVENOUS at 19:38

## 2024-10-18 RX ADMIN — ATORVASTATIN CALCIUM 80 MG: 40 TABLET, FILM COATED ORAL at 19:45

## 2024-10-18 RX ADMIN — ACETAMINOPHEN 1000 MG: 500 TABLET, FILM COATED ORAL at 19:45

## 2024-10-18 ASSESSMENT — ACTIVITIES OF DAILY LIVING (ADL)
ADLS_ACUITY_SCORE: 38

## 2024-10-18 ASSESSMENT — COLUMBIA-SUICIDE SEVERITY RATING SCALE - C-SSRS
6. HAVE YOU EVER DONE ANYTHING, STARTED TO DO ANYTHING, OR PREPARED TO DO ANYTHING TO END YOUR LIFE?: NO
1. IN THE PAST MONTH, HAVE YOU WISHED YOU WERE DEAD OR WISHED YOU COULD GO TO SLEEP AND NOT WAKE UP?: NO
2. HAVE YOU ACTUALLY HAD ANY THOUGHTS OF KILLING YOURSELF IN THE PAST MONTH?: NO

## 2024-10-18 NOTE — PHARMACY-ADMISSION MEDICATION HISTORY
"Pharmacist Admission Medication History    Admission medication history is complete. The information provided in this note is only as accurate as the sources available at the time of the update.    Information Source(s): Patient and CareEverywhere/SureScripts via in-person    Pertinent Information: patient is a reliable historian. Patient states still taking Imdur, spironolactone and metolazone despite no recent fill history.   Patient was not sure of his last dose of spironolactone, but when asked to clarify schedule knew he was due on Saturday 10/19/24. Did not adjust on PTA med list at this time.     Changes made to PTA medication list:  Added: albuterol inhaler   Deleted: None  Changed: lasix 20 mg BID -> 20 mg AM, 40 mg PM, clarified levothyroxine dosing instructions, miralax removed \"takes about 2x per week\", now increased use, xarelto dinner -> breakfast    Allergies reviewed with patient and updates made in EHR: yes    Medication History Completed By: Payal Adamson [facility] 10/18/2024 4:13 PM    PTA Med List   Medication Sig Note Last Dose    acetaminophen (TYLENOL) 500 MG tablet Take 1,000 mg by mouth 3 times daily  10/18/2024 at am    albuterol (PROAIR HFA/PROVENTIL HFA/VENTOLIN HFA) 108 (90 Base) MCG/ACT inhaler Inhale 2 puffs into the lungs every 6 hours as needed for shortness of breath, wheezing or cough.  More than a month at PRN    aspirin (ASA) 81 MG chewable tablet Take 1 tablet by mouth daily  10/18/2024 at am    atorvastatin (LIPITOR) 80 MG tablet Take 1 tablet (80 mg) by mouth every evening  10/17/2024 at pm    diltiazem ER COATED BEADS (CARDIZEM CD/CARTIA XT) 300 MG 24 hr capsule Take 1 capsule (300 mg) by mouth daily  10/17/2024 at pm    ferrous sulfate (SLOW FE) 142 (45 Fe) MG CR tablet TAKE ONE TABLET BY MOUTH EVERY OTHER DAY  10/17/2024 at am    Fluticasone-Umeclidin-Vilanterol (TRELEGY ELLIPTA) 200-62.5-25 MCG/INH oral inhaler Inhale 1 puff into the lungs daily  10/18/2024 at am    " furosemide (LASIX) 40 MG tablet Take 0.5 tablets (20 mg) by mouth 2 times daily. (Patient taking differently: Take 20-40 mg by mouth 2 times daily. 20 mg in AM, 40 mg in PM) 10/18/2024: Has taken increased dose for ~2 weeks PTA 10/18/2024 at am    isosorbide mononitrate (IMDUR) 30 MG 24 hr tablet Take 1 tablet (30 mg) by mouth daily  10/17/2024 at pm    levothyroxine (SYNTHROID/LEVOTHROID) 175 MCG tablet Take 1 tablet (175 mcg) by mouth daily (Patient taking differently: Take 175 mcg by mouth six times a week. 350 mcg on Fridays) 10/18/2024: 350 mcg Friday, 175 mcg all other days 10/18/2024 at am, 350 mcg    lisinopril (ZESTRIL) 20 MG tablet Take 1 tablet (20 mg) by mouth daily  10/17/2024 at pm    metolazone (ZAROXOLYN) 2.5 MG tablet Take 1 tablet (2.5 mg) by mouth 2 times daily  10/18/2024 at am    Multiple vitamin TABS Take 1 tablet by mouth daily  10/18/2024 at am    polyethylene glycol (MIRALAX) 17 g packet Take 17 g by mouth daily (Patient taking differently: Take 1 packet by mouth daily as needed.)  10/18/2024 at am, takes most days    potassium chloride cammy ER (KLOR-CON M10) 10 MEQ CR tablet Take 1 tablet (10 mEq) by mouth 2 times daily  10/18/2024 at am    spironolactone (ALDACTONE) 25 MG tablet TAKE 1 TABLET(25 MG) BY MOUTH MWFSat  10/17/2024 at am (unsure date of last dose, due 10/19/24)    tamsulosin (FLOMAX) 0.4 MG capsule Take 1 capsule (0.4 mg) by mouth daily  10/17/2024 at pm    venlafaxine (EFFEXOR) 75 MG tablet TAKE 1 TABLET(75 MG) BY MOUTH TWICE DAILY  10/18/2024 at am    XARELTO ANTICOAGULANT 20 MG TABS tablet TAKE 1 TABLET(20 MG) BY MOUTH DAILY WITH DINNER (Patient taking differently: Take 20 mg by mouth daily (with breakfast).)  10/18/2024 at am

## 2024-10-18 NOTE — ED NOTES
[facility]  ED Nurse Handoff Report    ED Chief complaint: Dizziness      ED Diagnosis:   Final diagnoses:   MARTÍNEZ (dyspnea on exertion)   Elevated troponin   DONA (acute kidney injury) (H)   Orthostatic dizziness       Code Status:     Allergies:   Allergies   Allergen Reactions    No Known Allergies        Patient Story: pt present to ED after near syncope and lightheadedness for the past week especially when standing. Pt also presenyt with bilateral leg edema and notes this is being managed with SANTHOSH socks. Pt also has pace maker placed and recently had battery changed. Pace maker interrogated please see results   Focused Assessment:  x ray shows possible pneumonia pt being sent for chest CT and trop elevated at 55. Pt attempted ambulation trial with staff and report continued dizziness along with momentary decrease in oxygen saturations      Medications   sodium chloride 0.9% BOLUS 500 mL (has no administration in time range)   LORazepam (ATIVAN) half-tab 0.25 mg (has no administration in time range)        Treatments and/or interventions provided: blood work imaging   Patient's response to treatments and/or interventions: denies pain calm cooperative     To be done/followed up on inpatient unit:  cardiology follow up care     Does this patient have any cognitive concerns?:  none     Activity level - Baseline/Home:  Unknown  Activity Level - Current:   Stand with Assist    Patient's Preferred language: English   Needed?: No    Isolation: None  Infection: Not Applicable  Patient tested for COVID 19 prior to admission: yes  Bariatric?: No    Vital Signs:   Vitals:    10/18/24 1226   BP: 111/59   Pulse: 65   Resp: 16   Temp: 97  F (36.1  C)   SpO2: 94%   Weight: 114.3 kg (252 lb)   Height: 1.829 m (6')       Cardiac Rhythm:     Was the PSS-3 completed:   Yes  What interventions are required if any?               Family Comments: none present   OBS brochure/video discussed/provided to  patient/family: No              Name of person given brochure if not patient:               Relationship to patient: none present     For the majority of the shift this patient's behavior was Green.   Behavioral interventions performed were none     ED NURSE PHONE NUMBER: ED RN

## 2024-10-18 NOTE — PROGRESS NOTES
RECEIVING UNIT ED HANDOFF REVIEW    ED Nurse Handoff Report was reviewed by: Elkin Stratton RN on October 18, 2024 at 5:13 PM

## 2024-10-18 NOTE — ED PROVIDER NOTES
Emergency Department Note      History of Present Illness     Chief Complaint   Dizziness    HPI   Erich Craven is a 84 year old male, anticoagulated on Xarelto, with a history of hypertension, chronic diastolic heart failure, paroxysmal atrial fibrillation, cardiac pacemaker in place, CAD, DM2, DVT, PE, COPD, and sick sinus syndrome amongst others who presents to the ED for dizziness. The patient reports experiencing dizziness and lightheadedness to the point of near syncope over the past week. These symptoms often present shortly after standing up, and is making it difficult to move around. He endorses shortness of breath with exertion and mild rhinorrhea. He denies any chest pain, fever, diarrhea, or sore throat. He further notes having a productive cough, but this has been present for a while. States he suffered a couple episodes of vomiting last week though attributes this to a suspect meal. Patient notes swelling in both legs though notes this is chronic and being managed with compression socks. Denies recent unplanned weight change. Of note, patient had a new battery placed in his [employer] pacemaker about a month ago. He also notes a MOHS procedure performed 10 days ago.    Independent Historian   None    Review of External Notes   Reviewed the nurse triage note from earlier today.  Reviewed the cardiology office visit from 8/29/2024.    Past Medical History     Medical History and Problem List   (HFpEF) heart failure with preserved ejection fraction  Alcohol dependence in remission  Anxiety  Arthritis  Benign neoplasm of cerebral meninges  Benign prostatic hyperplasia  BPH with urinary obstruction  CAD (coronary artery disease)  Cardiac pacemaker in situ  Chronic diastolic heart failure  Chronic kidney disease, stage 3a  Chronic right shoulder pain  Complication of anesthesia  COPD (chronic obstructive pulmonary disease)  Diabetes mellitus 2  DVT (deep venous thrombosis)  Dyspnea  Essential  hypertension  Fatigue  HLD (hyperlipidemia)  Hx of long term use of blood thinners  Hypokalemia  Hypothyroidism  Hypoxia  Hypoxemia  Insomnia  Left artificial shoulder joint  Lymphedema  Mumps  Muscle tension headache  Obesity, Class III, BMI 40-49.9 (morbid obesity)  RIN (obstructive sleep apnea)  Pacemaker  Paroxysmal A-fib  PE (pulmonary thromboembolism)  Pontine lesion  Sick sinus syndrome  Sleep apnea  Status post coronary angiogram    Medications   Tylenol  Aspirin 81mg  Lipitor  Cardizem  Trelegy Ellipta  Lasix  Imdur  Synthroid  Zestril  Zaroxolyn   Miralax  Klor-con  Aldactone  Flomax  Effexor  Xarelto    Surgical History   Past Surgical History:   Procedure Laterality Date    cardiac stenting      COLONOSCOPY  2010    CV RIGHT HEART CATH MEASUREMENTS RECORDED N/A 11/05/2019    Procedure: Right Heart Cath;  Surgeon: Roberto Hernandez MD;  Location:  HEART CARDIAC CATH LAB    ENT SURGERY      tonsillectomy    EP PACEMAKER GENERATOR REPLACEMENT- DUAL N/A 9/23/2024    Procedure: Pacemaker Generator Replacement Dual;  Surgeon: Denis Perez MD;  Location:  HEART CARDIAC CATH LAB    EYE SURGERY  2012    IMPLANT PACEMAKER      INJECT NERVE BLOCK SUPRASCAPULAR Bilateral 08/26/2021    Procedure: BLOCK, NERVE, SUPRASCAPULAR, Bilateral, under ultrasound guidance;  Surgeon: Swetha Allen MD;  Location: [facility] OR    INJECT NERVE BLOCK SUPRASCAPULAR Bilateral 09/23/2021    Procedure: bilateral suprascapular nerve block;  Surgeon: Swetha Allen MD;  Location: [facility] OR    INJECT NERVE BLOCK SUPRASCAPULAR Left 09/15/2022    Procedure: BLOCK, NERVE, SUPRASCAPULAR- left;  Surgeon: Swetha Allen MD;  Location: [facility] OR    INJECT NERVE BLOCK SUPRASCAPULAR Left 10/20/2022    Procedure: BLOCK, NERVE, SUPRASCAPULAR- left;  Surgeon: Swetha Allen MD;  Location: [facility] OR    INJECT NERVE BLOCK SUPRASCAPULAR Left 01/05/2023    Procedure: BLOCK, NERVE, SUPRASCAPULAR (left);  Surgeon: Swetha Allen MD;  Location: [facility] OR     NERVE BLOCK PERIPHERAL Bilateral 03/10/2022    Procedure: bilateral suprascapular nerve block;  Surgeon: Swetha Allen MD;  [place] [facility] [place]    ORTHOPEDIC SURGERY Right 2012    knee replacement    REVERSE ARTHROPLASTY SHOULDER Left 02/21/2023    Procedure: ARTHROPLASTY, SHOULDER, TOTAL, REVERSE LEFT;  Surgeon: Katelyn Vasquez MD;  Location:  OR    VASCULAR SURGERY  2022       Physical Exam     Patient Vitals for the past 24 hrs:   BP Temp Pulse Resp SpO2 Height Weight   10/18/24 1226 111/59 97  F (36.1  C) 65 16 94 % 1.829 m (6') 114.3 kg (252 lb)     Physical Exam  Vitals and nursing note reviewed.   Constitutional:       General: He is not in acute distress.     Appearance: He is ill-appearing (Chronically ill-appearing).   HENT:      Head: Normocephalic and atraumatic.      Right Ear: External ear normal.      Left Ear: External ear normal.      Nose: Nose normal.   Eyes:      Conjunctiva/sclera: Conjunctivae normal.   Cardiovascular:      Rate and Rhythm: Normal rate and regular rhythm.      Heart sounds: No murmur heard.     Comments: +Pacemaker  Pulmonary:      Effort: Pulmonary effort is normal. No respiratory distress.      Breath sounds: Rales (bibasilar) present. No wheezing or rhonchi.   Abdominal:      General: Abdomen is flat. There is no distension.      Palpations: Abdomen is soft.      Tenderness: There is no abdominal tenderness. There is no guarding or rebound.   Musculoskeletal:         General: Swelling (mild BLE) present. No deformity.      Cervical back: Normal range of motion and neck supple.   Skin:     General: Skin is warm and dry.      Findings: No rash.   Neurological:      Mental Status: He is alert and oriented to person, place, and time.   Psychiatric:         Behavior: Behavior normal.           Diagnostics     Lab Results   Labs Ordered and Resulted from Time of ED Arrival to Time of ED Departure   COMPREHENSIVE METABOLIC PANEL - Abnormal       Result Value    Sodium 135       Potassium 4.1      Carbon Dioxide (CO2) 30 (*)     Anion Gap 11      Urea Nitrogen 27.6 (*)     Creatinine 1.39 (*)     GFR Estimate 50 (*)     Calcium 9.5      Chloride 94 (*)     Glucose 84      Alkaline Phosphatase 125      AST 31      ALT 26      Protein Total 7.0      Albumin 4.2      Bilirubin Total 0.3     CBC WITH PLATELETS AND DIFFERENTIAL - Abnormal    WBC Count 9.5      RBC Count 3.88 (*)     Hemoglobin 12.4 (*)     Hematocrit 37.8 (*)     MCV 97      MCH 32.0      MCHC 32.8      RDW 14.7      Platelet Count 186      % Neutrophils 68      % Lymphocytes 23      % Monocytes 7      % Eosinophils 1      % Basophils 1      % Immature Granulocytes 1      NRBCs per 100 WBC 0      Absolute Neutrophils 6.5      Absolute Lymphocytes 2.1      Absolute Monocytes 0.7      Absolute Eosinophils 0.1      Absolute Basophils 0.1      Absolute Immature Granulocytes 0.1      Absolute NRBCs 0.0     TROPONIN T, HIGH SENSITIVITY - Abnormal    Troponin T, High Sensitivity 60 (*)    TROPONIN T, HIGH SENSITIVITY - Abnormal    Troponin T, High Sensitivity 55 (*)    NT PROBNP INPATIENT - Normal    N terminal Pro BNP Inpatient <36     INFLUENZA A/B, RSV, & SARS-COV2 PCR - Normal    Influenza A PCR Negative      Influenza B PCR Negative      RSV PCR Negative      SARS CoV2 PCR Negative     PROCALCITONIN - Normal    Procalcitonin 0.06     CRP INFLAMMATION - Normal    CRP Inflammation <3.00       Imaging   XR Chest 2 Views   Final Result   IMPRESSION: Increased patchy opacity at the medial right lung base.   This could represent developing airspace disease including pneumonia.   Normal cardiac silhouette. Stable left chest pacer. Bilateral shoulder   arthroplasties.      QUINN ROBLERO MD            SYSTEM ID:  [id number]      Chest CT w/o contrast    (Results Pending)     EKG   ECG taken at 1228, ECG read at 1240  Sinus rhythm with sinus arrhythmia with 1st degree AV block  Rightward axis  Borderline ECG   Rate 66 bpm. KS interval 212  ms. QRS duration 90 ms. QT/QTc 392/410 ms. P-R-T axes 72 103 83.    Independent Interpretation   CXR: R basilar infiltrate.    ED Course      Medications Administered   Medications   sodium chloride 0.9% BOLUS 500 mL (has no administration in time range)       Procedures   Procedures     Discussion of Management   Admitting Hospitalist, Dr Gibson    ED Course   ED Course as of 10/18/24 1546   Fri Oct 18, 2024   1309 I obtained history and examined the patient as noted above.     1544 I discussed with Dr Gibson with Hospitalist team.        Additional Documentation  None    Medical Decision Making / Diagnosis     CMS Diagnoses: None    MIPS       None    MDM   Erich Craven is a 84 year old male who presents with near syncope and dyspnea on exertion for the past week.  He appears no distress on arrival.  Differential diagnosis includes arrhythmia, pacemaker malfunction, acute anemia, ACS.  Have low suspicion for PE given that he has been compliant with his Xarelto.  Workup was pursued as noted.  He has anemia but this appears stable from previous.  His creatinine is slightly increased and he may have some dehydration due to his diuretic.  This could be attributing to his orthostatic dizziness.  Viral swabs are negative.  His troponin is elevated above his usual baseline.  EKG shows no significant ischemic changes.  His pacemaker was interrogated and showed no malfunction or arrhythmias.  We attempted to ambulate the patient and he became more short of breath and dizzy and his O2 saturation apparently went down into the 80s.  We will plan to admit.  I discussed with the hospitalist who requested that I order a CT noncontrast of the chest.  She will plan to admit for observation for now.    Disposition   The patient was admitted to the hospital.     Diagnosis     ICD-10-CM    1. MARTÍNEZ (dyspnea on exertion)  R06.09       2. Elevated troponin  R79.89       3. DONA (acute kidney injury) (H)  N17.9       4. Orthostatic  dizziness  R42            Discharge Medications   New Prescriptions    No medications on file     Scribe Disclosure:  I, Fang Perry, am serving as a scribe at 1:23 PM on 10/18/2024 to document services personally performed by Jose Eduardo Rodriguez MD based on my observations and the provider's statements to me.      Jose Eduardo Rodriguez MD  10/18/24 2680

## 2024-10-18 NOTE — PROGRESS NOTES
diagnostic tests and consults completed and resulted  not met, PT/OT, T-L spine X-ray and Echo pending  -vital signs normal or at patient baseline  not met, orthostatics +  -dyspnea improved and O2 sats greater than 88% on room air or prior home oxygen levels met  -safe disposition plan has been identified not met pending PT/OT  Nurse to notify provider when observation goals have been met and patient is ready for discharge.

## 2024-10-18 NOTE — H&P
[facility]    History and Physical - Hospitalist Service       Date of Admission:  10/18/2024    Assessment & Plan      Erich Craven is a 84 year old male admitted on 10/18/2024.     Erich Craven is a 84 year old male with history of DVT and PE on Xarelto, hypertension, chronic diastolic heart failure, obstructive sleep apnea, COPD, hypothyroidism, paroxysmal atrial fibrillation, dyslipidemia, coronary artery disease, lymphedema, type 2 diabetes mellitus, chronic kidney disease stage III, history of sick sinus syndrome status post PPM with recent new battery change for the PPM who presents with dizziness and near syncopal episode over the past week associated with shortness of breath on exertion.    Dyspnea on exertion  Dizziness and near syncopal episode  -Presents with dizziness, dyspnea on exertion and near syncopal episodes going on for about 2 weeks.  No history of fall or syncope  -Chest x-ray shows-Increased patchy opacity in the medial right lung base.  Patient however denies fever at home, does have chronic morning cough with whitish phlegm but no changes in his cough.  No evidence of leukocytosis and procalcitonin normal  -Will do chest CT to further evaluate the patchy opacity  -His standing blood pressure did decrease from 1 17( lying )-103 Standing, but did not meet criteria for orthostatic hypotension  -Troponin is chronically elevated around 40s but slightly more elevated on arrival than at his baseline to 60 on arrival and trending down at 55 on repeat check  -Patient without any chest pain and EKG with no ischemic changes  -ED provider had done device check which was unremarkable per report  -Check echocardiogram  -Continue to monitor in telemetry  -Currently on room air at rest, per ED provider patient's O2 sats dropped with ambulation, supplemental oxygen as needed    Addend : Notified by nursing that since arrival to the floor orthostatic blood pressure dropped  from 140s to 90s.  Will give him IV fluid bolus and reevaluate      Generalized weakness  -Likely secondary to soft blood pressure, some component of hypoxia also contributing  -Hold PTA antihypertensives for now as blood pressure normal at presentation, patient got some IV fluids in the ED, monitor orthostatics for now, will not continue IV fluids given history of diastolic CHF   -PT OT evaluation and treatment  -Fall precaution    Generalized anxiety  -Resume PTA venlafaxine.  -As needed Ativan    Chronic diastolic CHF  History of sick sinus syndrome status post PPM, recent battery change  Paroxysmal atrial fibrillation  Coronary artery disease  Chronically elevated troponin  Hypertension  Dyslipidemia  -Holding PTA diltiazem, Lasix, Imdur, metolazone, Aldactone lisinopril due to normal blood pressure, dizziness and slight drop in blood pressure with standing, mild DONA, gradually reintroduce as blood pressure and renal function allows  -Resume PTA aspirin, Xarelto, statin  -Does have chronically elevated troponin, however troponin slightly elevated then around his baseline of 40.  Getting echocardiogram as above  -Monitor in telemetry  -I's and O's, daily weight    History of DVT and PE on Xarelto  -PTA Xarelto resumed    COPD  -PTA inhalers resumed    Acute kidney injury on chronic kidney disease stage IIIa  -Baseline creatinine of around 1.1, presented with a creatinine of 1.39  -Received 500 cc IV fluid bolus in the ED  -Monitor labs, holding PTA antihypertensives/diuretics as above, gradually reintroduce as creatinine and blood pressure allows  -Avoid nephrotoxin  -Monitor    Chronic lymphedema  -Uses lymphedema wraps  -Holding diuretics as above  -Outpatient follow-up    Chronic anemia  -Recent hemoglobin around 12.  Patient denies any active bleeding.  Hemoglobin at presentation at baseline.  -Monitor intermittently    Lower back pain  -Denies any history of fall but reports his back pain has been gradually  getting worse  -X-ray of lumbar spine    Type 2 diabetes mellitus  -Not on any medication PTA, most recent hemoglobin A1c 6 on January 2024.  Blood sugar-on admission was 84  -Outpatient follow-up         Diet:  Cardiac  DVT Prophylaxis: DOAC  Reddy Catheter: Not present  Lines: None     Cardiac Monitoring: None  Code Status:  Full code, discussed with patient    Clinically Significant Risk Factors Present on Admission          # Hypochloremia: Lowest Cl = 94 mmol/L in last 2 days, will monitor as appropriate       # Drug Induced Coagulation Defect: home medication list includes an anticoagulant medication  # Drug Induced Platelet Defect: home medication list includes an antiplatelet medication   # Hypertension: Noted on problem list  # Chronic heart failure with preserved ejection fraction: heart failure noted on problem list and last echo with EF >50%        # Obesity: Estimated body mass index is 34.18 kg/m  as calculated from the following:    Height as of this encounter: 1.829 m (6').    Weight as of this encounter: 114.3 kg (252 lb).        # Pacemaker present       Disposition Plan     Medically Ready for Discharge: Anticipated Tomorrow           Yvonne Gibson MD  Hospitalist Service  [facility]  Securely message with [employer] (more info)  Text page via [facility] Paging/Directory     ______________________________________________________________________    Chief Complaint   Dyspnea on exertion, generalized weakness    History is obtained from the patient, electronic health record, and emergency department physician    History of Present Illness   Erich Craven is a 84 year old male with history of DVT and PE on Xarelto, hypertension, chronic diastolic heart failure, obstructive sleep apnea, COPD, hypothyroidism, paroxysmal atrial fibrillation, dyslipidemia, coronary artery disease, lymphedema, type 2 diabetes mellitus, chronic kidney disease stage III, history of sick sinus syndrome  status post PPM with recent new battery change for the PPM who presents with dizziness and near syncopal episode over the past week associated with shortness of breath on exertion.    There is no history of chest pain, fever, sore throat.  He does have chronic productive cough.  The patient gives history of few episodes of vomiting      Past Medical History    Past Medical History:   Diagnosis Date    (HFpEF) heart failure with preserved ejection fraction (H)     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 Started in 2012    Hypothyroidism     Lymphedema     Mumps     Obesity, Class III, BMI 40-49.9 (morbid obesity) (H)     RIN (obstructive sleep apnea)     has refused CPAP    Pacemaker     Paroxysmal A-fib (H)     PE (pulmonary thromboembolism) (H)     Status post coronary angiogram 11/05/2019       Past Surgical History   Past Surgical History:   Procedure Laterality Date    cardiac stenting      COLONOSCOPY  2010    CV RIGHT HEART CATH MEASUREMENTS RECORDED N/A 11/05/2019    Procedure: Right Heart Cath;  Surgeon: Roberto Hernandez MD;  Location: [facility] CARDIAC CATH LAB    ENT SURGERY      tonsillectomy    EP PACEMAKER GENERATOR REPLACEMENT- DUAL N/A 9/23/2024    Procedure: Pacemaker Generator Replacement Dual;  Surgeon: Denis Perez MD;  Location: [facility] CARDIAC CATH LAB    EYE SURGERY  2012    IMPLANT PACEMAKER      INJECT NERVE BLOCK SUPRASCAPULAR Bilateral 08/26/2021    Procedure: BLOCK, NERVE, SUPRASCAPULAR,  Bilateral, under ultrasound guidance;  Surgeon: Swetha Allen MD;  Location: [facility] OR    INJECT NERVE BLOCK SUPRASCAPULAR Bilateral 09/23/2021    Procedure: bilateral suprascapular nerve block;  Surgeon: Swetha Allen MD;  Location: [facility] OR    INJECT NERVE BLOCK SUPRASCAPULAR Left 09/15/2022    Procedure: BLOCK, NERVE, SUPRASCAPULAR- left;  Surgeon: Swetha Allen MD;  Location: [facility] OR    INJECT NERVE BLOCK SUPRASCAPULAR Left 10/20/2022    Procedure: BLOCK, NERVE, SUPRASCAPULAR- left;  Surgeon: Swetha Allen MD;  Location: [facility] OR    INJECT NERVE BLOCK SUPRASCAPULAR Left 01/05/2023    Procedure: BLOCK, NERVE, SUPRASCAPULAR (left);  Surgeon: Swetha Allen MD;  Location: [facility] OR    NERVE BLOCK PERIPHERAL Bilateral 03/10/2022    Procedure: bilateral suprascapular nerve block;  Surgeon: Swetha Allen MD;  Location: [facility] OR    ORTHOPEDIC SURGERY Right 2012    knee replacement    REVERSE ARTHROPLASTY SHOULDER Left 02/21/2023    Procedure: ARTHROPLASTY, SHOULDER, TOTAL, REVERSE LEFT;  Surgeon: Katelyn Vasquez MD;  Location: [facility] OR    VASCULAR SURGERY  2022       Prior to Admission Medications   Prior to Admission Medications   Prescriptions Last Dose Informant Patient Reported? Taking?   Fluticasone-Umeclidin-Vilanterol (TRELEGY ELLIPTA) 200-62.5-25 MCG/INH oral inhaler   Yes No   Sig: Inhale 1 puff into the lungs daily   Multiple vitamin TABS   Yes No   Sig: Take 1 tablet by mouth daily   XARELTO ANTICOAGULANT 20 MG TABS tablet   No No   Sig: TAKE 1 TABLET(20 MG) BY MOUTH DAILY WITH DINNER   acetaminophen (TYLENOL) 500 MG tablet   Yes No   Sig: Take 1,000 mg by mouth 3 times daily   aspirin (ASA) 81 MG chewable tablet   Yes No   Sig: Take 1 tablet by mouth daily   atorvastatin (LIPITOR) 80 MG tablet   No No   Sig: Take 1 tablet (80 mg) by mouth every evening   diltiazem ER COATED BEADS (CARDIZEM CD/CARTIA XT) 300 MG 24 hr capsule   No No   Sig: Take 1 capsule (300 mg) by mouth daily   ferrous sulfate  (SLOW FE) 142 (45 Fe) MG CR tablet   No No   Sig: TAKE ONE TABLET BY MOUTH EVERY OTHER DAY   furosemide (LASIX) 40 MG tablet   No No   Sig: Take 0.5 tablets (20 mg) by mouth 2 times daily.   isosorbide mononitrate (IMDUR) 30 MG 24 hr tablet   No No   Sig: Take 1 tablet (30 mg) by mouth daily   levothyroxine (SYNTHROID/LEVOTHROID) 175 MCG tablet   No No   Sig: Take 1 tablet (175 mcg) by mouth daily   lisinopril (ZESTRIL) 20 MG tablet   No No   Sig: Take 1 tablet (20 mg) by mouth daily   metolazone (ZAROXOLYN) 2.5 MG tablet   No No   Sig: Take 1 tablet (2.5 mg) by mouth 2 times daily   polyethylene glycol (MIRALAX) 17 g packet   No No   Sig: Take 17 g by mouth daily   Patient taking differently: Take 1 packet by mouth daily as needed.   potassium chloride cammy ER (KLOR-CON M10) 10 MEQ CR tablet   No No   Sig: Take 1 tablet (10 mEq) by mouth 2 times daily   spironolactone (ALDACTONE) 25 MG tablet   No No   Sig: TAKE 1 TABLET(25 MG) BY MOUTH MWFSat   tamsulosin (FLOMAX) 0.4 MG capsule   No No   Sig: Take 1 capsule (0.4 mg) by mouth daily   venlafaxine (EFFEXOR) 75 MG tablet   No No   Sig: TAKE 1 TABLET(75 MG) BY MOUTH TWICE DAILY      Facility-Administered Medications: None        Review of Systems    The 10 point Review of Systems is negative other than noted in the HPI or here.      Physical Exam   Vital Signs: Temp: 97  F (36.1  C)   BP: 111/59 Pulse: 65   Resp: 16 SpO2: 94 %      Weight: 252 lbs 0 oz    Exam:  Constitutional: Awake, alert and no distress. Appears comfortable  Head: Normocephalic. No masses, lesions, tenderness or abnormalities  ENMT: ENT exam normal, no neck nodes or sinus tenderness  Cardiovascular: RRR.  2+ murmurs, PPM in place, no rubs or JVD  Respiratory: Normal WOB,b/l equal air entry, no wheezes or crackles   Gastrointestinal: Abdomen soft, non-tender. BS normal. No masses, organomegaly  : Deferred  Extremities : 1+ bilateral pitting edema , chronic venous stasis changes, no clubbing or  cyanosis         Medical Decision Making       80 MINUTES SPENT BY ME on the date of service doing chart review, history, exam, documentation & further activities per the note.      Data     I have personally reviewed the following data over the past 24 hrs:    9.5  \   12.4 (L)   / 186     135 94 (L) 27.6 (H) /  84   4.1 30 (H) 1.39 (H) \     ALT: 26 AST: 31 AP: 125 TBILI: 0.3   ALB: 4.2 TOT PROTEIN: 7.0 LIPASE: N/A     Trop: 55 (H) BNP: <36     Procal: 0.06 CRP: <3.00 Lactic Acid: N/A         Imaging results reviewed over the past 24 hrs:   Recent Results (from the past 24 hour(s))   XR Chest 2 Views    Narrative    CHEST TWO VIEWS   10/18/2024 1:52 PM     HISTORY: Cough, shortness of breath.     COMPARISON: 4/22/2024.      Impression    IMPRESSION: Increased patchy opacity at the medial right lung base.  This could represent developing airspace disease including pneumonia.  Normal cardiac silhouette. Stable left chest pacer. Bilateral shoulder  arthroplasties.    QUINN ROBLERO MD         SYSTEM ID:  [id number]     Recent Labs   Lab 10/18/24  1248   WBC 9.5   HGB 12.4*   MCV 97         POTASSIUM 4.1   CHLORIDE 94*   CO2 30*   BUN 27.6*   CR 1.39*   ANIONGAP 11   LAWSON 9.5   GLC 84   ALBUMIN 4.2   PROTTOTAL 7.0   BILITOTAL 0.3   ALKPHOS 125   ALT 26   AST 31

## 2024-10-18 NOTE — ED TRIAGE NOTES
Pt reports about a week of intermittent feelings of blacking out and SOB.     Triage Assessment (Adult)       Row Name 10/18/24 1224          Triage Assessment    Airway WDL WDL        Respiratory WDL    Respiratory WDL WDL        Skin Circulation/Temperature WDL    Skin Circulation/Temperature WDL WDL        Cardiac WDL    Cardiac WDL WDL        Peripheral/Neurovascular WDL    Peripheral Neurovascular WDL WDL        Cognitive/Neuro/Behavioral WDL    Cognitive/Neuro/Behavioral WDL --  dizziness

## 2024-10-18 NOTE — TELEPHONE ENCOUNTER
"Reason for Disposition   MILD difficulty breathing (e.g., minimal/no SOB at rest, SOB with walking, pulse < 100) of new-onset or worse than normal    Additional Information   Negative: SEVERE difficulty breathing (e.g., struggling for each breath, speaks in single words, pulse > 120)   Negative: Breathing stopped and hasn't returned   Negative: Choking on something   Negative: Bluish (or gray) lips or face   Negative: Difficult to awaken or acting confused (e.g., disoriented, slurred speech)   Negative: Passed out (i.e., fainted, collapsed and was not responding)   Negative: Wheezing started suddenly after medicine, an allergic food, or bee sting   Negative: Stridor (harsh sound while breathing in)   Negative: Slow, shallow and weak breathing   Negative: Sounds like a life-threatening emergency to the triager   Negative: Chest pain   Negative: Wheezing (high pitched whistling sound) and previous asthma attacks or use of asthma medicines   Negative: Difficulty breathing and within 14 days of COVID-19 Exposure   Negative: Difficulty breathing and only present when coughing   Negative: Difficulty breathing and only from stuffy nose   Negative: Difficulty breathing and only from stuffy nose or runny nose from common cold   Negative: MODERATE difficulty breathing (e.g., speaks in phrases, SOB even at rest, pulse 100-120) of new-onset or worse than normal   Negative: Oxygen level (e.g., pulse oximetry) 90% or lower   Negative: Wheezing can be heard across the room   Negative: Drooling or spitting out saliva (because can't swallow)   Negative: Any history of prior \"blood clot\" in leg or lungs   Negative: Illness requiring prolonged bedrest in past month (e.g., immobilization, long hospital stay)   Negative: Hip or leg fracture (broken bone) in past month (or had cast on leg or ankle in past month)   Negative: Major surgery in the past month   Negative: Long-distance travel in past month (e.g., car, bus, train, plane; with " "trip lasting 6 or more hours)   Negative: Cancer treatment in past six months (or has cancer now)   Negative: Extra heartbeats, irregular heart beating, or heart is beating very fast (i.e., 'palpitations')   Negative: Fever > 103 F (39.4 C)   Negative: Fever > 101 F (38.3 C) and over 60 years of age   Negative: Fever > 100.0 F (37.8 C) and bedridden (e.g., nursing home patient, stroke, chronic illness, recovering from surgery)   Negative: Fever > 100.0 F (37.8 C) and diabetes mellitus or weak immune system (e.g., HIV positive, cancer chemo, splenectomy, organ transplant, chronic steroids)   Negative: Periods where breathing stops and then resumes normally and bedridden (e.g., nursing home patient, CVA)   Negative: Pregnant or postpartum (from 0 to 6 weeks after delivery)   Negative: Patient sounds very sick or weak to the triager    Answer Assessment - Initial Assessment Questions  1. RESPIRATORY STATUS: \"Describe your breathing?\" (e.g., wheezing, shortness of breath, unable to speak, severe coughing)       Shortness of breath,  no cough except at night  2. ONSET: \"When did this breathing problem begin?\"       3 days ago  3. PATTERN \"Does the difficult breathing come and go, or has it been constant since it started?\"       Ok when sitting, O2 sat mid 90 's   4. SEVERITY: \"How bad is your breathing?\" (e.g., mild, moderate, severe)     - MILD: No SOB at rest, mild SOB with walking, speaks normally in sentences, can lie down, no retractions, pulse < 100.     - MODERATE: SOB at rest, SOB with minimal exertion and prefers to sit, cannot lie down flat, speaks in phrases, mild retractions, audible wheezing, pulse 100-120.     - SEVERE: Very SOB at rest, speaks in single words, struggling to breathe, sitting hunched forward, retractions, pulse > 120       Shortness of breath when get up, dizziness, does not feel stable moving around  5. RECURRENT SYMPTOM: \"Have you had difficulty breathing before?\" If Yes, ask: \"When was " "the last time?\" and \"What happened that time?\"       Not recently, hx COPD with pulmonary issues, uses bipap at night with 11% oxgen   6. CARDIAC HISTORY: \"Do you have any history of heart disease?\" (e.g., heart attack, angina, bypass surgery, angioplasty)       Yes, cardiac problem, last time checked ok  7. LUNG HISTORY: \"Do you have any history of lung disease?\"  (e.g., pulmonary embolus, asthma, emphysema)      copd  8. CAUSE: \"What do you think is causing the breathing problem?\"       No idea  9. OTHER SYMPTOMS: \"Do you have any other symptoms? (e.g., dizziness, runny nose, cough, chest pain, fever)      See above blood pressure 102/71 normally in the 120-130/70-80  10. O2 SATURATION MONITOR:  \"Do you use an oxygen saturation monitor (pulse oximeter) at home?\" If Yes, ask: \"What is your reading (oxygen level) today?\" \"What is your usual oxygen saturation reading?\" (e.g., 95%)        See above  11. PREGNANCY: \"Is there any chance you are pregnant?\" \"When was your last menstrual period?\"        no  12. TRAVEL: \"Have you traveled out of the country in the last month?\" (e.g., travel history, exposures)        no    Protocols used: Breathing Difficulty-A-[place]    "

## 2024-10-19 ENCOUNTER — APPOINTMENT (OUTPATIENT)
Dept: CARDIOLOGY | Facility: CLINIC | Age: 84
End: 2024-10-19
Attending: INTERNAL MEDICINE
Payer: MEDICARE

## 2024-10-19 ENCOUNTER — APPOINTMENT (OUTPATIENT)
Dept: PHYSICAL THERAPY | Facility: CLINIC | Age: 84
End: 2024-10-19
Attending: INTERNAL MEDICINE
Payer: MEDICARE

## 2024-10-19 LAB
ANION GAP SERPL CALCULATED.3IONS-SCNC: 8 MMOL/L (ref 7–15)
BUN SERPL-MCNC: 20.6 MG/DL (ref 8–23)
CALCIUM SERPL-MCNC: 9.3 MG/DL (ref 8.8–10.4)
CHLORIDE SERPL-SCNC: 101 MMOL/L (ref 98–107)
CREAT SERPL-MCNC: 1.16 MG/DL (ref 0.67–1.17)
EGFRCR SERPLBLD CKD-EPI 2021: 62 ML/MIN/1.73M2
ERYTHROCYTE [DISTWIDTH] IN BLOOD BY AUTOMATED COUNT: 14.3 % (ref 10–15)
GLUCOSE BLDC GLUCOMTR-MCNC: 94 MG/DL (ref 70–99)
GLUCOSE SERPL-MCNC: 98 MG/DL (ref 70–99)
HCO3 SERPL-SCNC: 27 MMOL/L (ref 22–29)
HCT VFR BLD AUTO: 37 % (ref 40–53)
HGB BLD-MCNC: 12.2 G/DL (ref 13.3–17.7)
LVEF ECHO: NORMAL
MCH RBC QN AUTO: 32.4 PG (ref 26.5–33)
MCHC RBC AUTO-ENTMCNC: 33 G/DL (ref 31.5–36.5)
MCV RBC AUTO: 98 FL (ref 78–100)
PLATELET # BLD AUTO: 158 10E3/UL (ref 150–450)
POTASSIUM SERPL-SCNC: 3.7 MMOL/L (ref 3.4–5.3)
RBC # BLD AUTO: 3.76 10E6/UL (ref 4.4–5.9)
SODIUM SERPL-SCNC: 136 MMOL/L (ref 135–145)
WBC # BLD AUTO: 8.5 10E3/UL (ref 4–11)

## 2024-10-19 PROCEDURE — 80048 BASIC METABOLIC PNL TOTAL CA: CPT | Performed by: INTERNAL MEDICINE

## 2024-10-19 PROCEDURE — 250N000013 HC RX MED GY IP 250 OP 250 PS 637: Performed by: HOSPITALIST

## 2024-10-19 PROCEDURE — 97530 THERAPEUTIC ACTIVITIES: CPT | Mod: GP | Performed by: PHYSICAL THERAPIST

## 2024-10-19 PROCEDURE — 93306 TTE W/DOPPLER COMPLETE: CPT | Mod: 26 | Performed by: INTERNAL MEDICINE

## 2024-10-19 PROCEDURE — 258N000003 HC RX IP 258 OP 636: Performed by: HOSPITALIST

## 2024-10-19 PROCEDURE — 97161 PT EVAL LOW COMPLEX 20 MIN: CPT | Mod: GP | Performed by: PHYSICAL THERAPIST

## 2024-10-19 PROCEDURE — G0378 HOSPITAL OBSERVATION PER HR: HCPCS

## 2024-10-19 PROCEDURE — 99232 SBSQ HOSP IP/OBS MODERATE 35: CPT | Performed by: HOSPITALIST

## 2024-10-19 PROCEDURE — 255N000002 HC RX 255 OP 636: Performed by: INTERNAL MEDICINE

## 2024-10-19 PROCEDURE — 82962 GLUCOSE BLOOD TEST: CPT

## 2024-10-19 PROCEDURE — 999N000208 ECHOCARDIOGRAM COMPLETE

## 2024-10-19 PROCEDURE — 85018 HEMOGLOBIN: CPT | Performed by: INTERNAL MEDICINE

## 2024-10-19 PROCEDURE — 96360 HYDRATION IV INFUSION INIT: CPT | Mod: XU

## 2024-10-19 PROCEDURE — 97116 GAIT TRAINING THERAPY: CPT | Mod: GP | Performed by: PHYSICAL THERAPIST

## 2024-10-19 PROCEDURE — 36415 COLL VENOUS BLD VENIPUNCTURE: CPT | Performed by: INTERNAL MEDICINE

## 2024-10-19 PROCEDURE — 250N000013 HC RX MED GY IP 250 OP 250 PS 637: Performed by: INTERNAL MEDICINE

## 2024-10-19 RX ORDER — DILTIAZEM HYDROCHLORIDE 120 MG/1
120 CAPSULE, COATED, EXTENDED RELEASE ORAL ONCE
Status: DISCONTINUED | OUTPATIENT
Start: 2024-10-19 | End: 2024-10-19

## 2024-10-19 RX ORDER — TAMSULOSIN HYDROCHLORIDE 0.4 MG/1
0.4 CAPSULE ORAL EVERY EVENING
Status: DISCONTINUED | OUTPATIENT
Start: 2024-10-20 | End: 2024-10-20 | Stop reason: HOSPADM

## 2024-10-19 RX ORDER — TAMSULOSIN HYDROCHLORIDE 0.4 MG/1
0.4 CAPSULE ORAL EVERY EVENING
Status: DISCONTINUED | OUTPATIENT
Start: 2024-10-19 | End: 2024-10-19

## 2024-10-19 RX ADMIN — LORAZEPAM 0.25 MG: 0.5 TABLET ORAL at 08:15

## 2024-10-19 RX ADMIN — ASPIRIN 81 MG CHEWABLE TABLET 81 MG: 81 TABLET CHEWABLE at 08:09

## 2024-10-19 RX ADMIN — ATORVASTATIN CALCIUM 80 MG: 40 TABLET, FILM COATED ORAL at 20:59

## 2024-10-19 RX ADMIN — VENLAFAXINE 75 MG: 37.5 TABLET ORAL at 20:59

## 2024-10-19 RX ADMIN — DILTIAZEM HYDROCHLORIDE 300 MG: 180 CAPSULE, EXTENDED RELEASE ORAL at 11:37

## 2024-10-19 RX ADMIN — LEVOTHYROXINE SODIUM 175 MCG: 100 TABLET ORAL at 07:03

## 2024-10-19 RX ADMIN — ACETAMINOPHEN 1000 MG: 500 TABLET, FILM COATED ORAL at 15:20

## 2024-10-19 RX ADMIN — LORAZEPAM 0.25 MG: 0.5 TABLET ORAL at 17:15

## 2024-10-19 RX ADMIN — ACETAMINOPHEN 1000 MG: 500 TABLET, FILM COATED ORAL at 08:09

## 2024-10-19 RX ADMIN — UMECLIDINIUM 1 PUFF: 62.5 AEROSOL, POWDER ORAL at 08:08

## 2024-10-19 RX ADMIN — RIVAROXABAN 20 MG: 10 TABLET, FILM COATED ORAL at 08:09

## 2024-10-19 RX ADMIN — ACETAMINOPHEN 1000 MG: 500 TABLET, FILM COATED ORAL at 20:59

## 2024-10-19 RX ADMIN — VENLAFAXINE 75 MG: 37.5 TABLET ORAL at 08:09

## 2024-10-19 RX ADMIN — SODIUM CHLORIDE 500 ML: 9 INJECTION, SOLUTION INTRAVENOUS at 10:16

## 2024-10-19 RX ADMIN — HUMAN ALBUMIN MICROSPHERES AND PERFLUTREN 9 ML: 10; .22 INJECTION, SOLUTION INTRAVENOUS at 15:17

## 2024-10-19 RX ADMIN — TAMSULOSIN HYDROCHLORIDE 0.4 MG: 0.4 CAPSULE ORAL at 08:09

## 2024-10-19 RX ADMIN — FLUTICASONE FUROATE AND VILANTEROL TRIFENATATE 1 PUFF: 200; 25 POWDER RESPIRATORY (INHALATION) at 08:08

## 2024-10-19 ASSESSMENT — ACTIVITIES OF DAILY LIVING (ADL)
ADLS_ACUITY_SCORE: 38
ADLS_ACUITY_SCORE: 38
ADLS_ACUITY_SCORE: 37
ADLS_ACUITY_SCORE: 38
ADLS_ACUITY_SCORE: 37
ADLS_ACUITY_SCORE: 40
ADLS_ACUITY_SCORE: 38
ADLS_ACUITY_SCORE: 37
ADLS_ACUITY_SCORE: 37
ADLS_ACUITY_SCORE: 40
ADLS_ACUITY_SCORE: 38

## 2024-10-19 NOTE — PROGRESS NOTES
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     Dyspnea on exertion  Dizziness and near syncopal episode  Generalized weakness  -Presents with dizziness, dyspnea on exertion and near syncopal episodes going on for about 2 weeks.  No history of fall or syncope  -Chest x-ray shows-Increased patchy opacity in the medial right lung base.  Patient however denies fever at home, does have chronic morning cough with whitish phlegm but no changes in his cough.  No evidence of leukocytosis and procalcitonin normal.  CT chest was done on 10/18 that did not show any evidence of pneumonia.  Tiny pulmonary nodules noted that could be followed up as outpatient per PCP-recommend 12-month CT follow-up.  -Troponin is chronically elevated around 40s but slightly more elevated on arrival than at his baseline to 60 on arrival and trending down at 55 on repeat check  -Patient without any chest pain and EKG with no ischemic changes  -ED provider had done device check which was unremarkable per report  -Check echocardiogram-pending  -Continue to monitor in telemetry  -Orthostatic vitals were positive and significant.  Suspect that patient is not tolerating his antihypertensive regimen [is currently on 6 antihypertensives including 3 diuretics: Metolazone, spironolactone, Lasix, lisinopril, Imdur, diltiazem].  All antihypertensives have been held.   Will resume PTA diltiazem on 10/19.  Received IV NS bolus at admission as well as on 10/19.  -PT OT evaluation and treatment  -Fall precaution     Generalized anxiety  -Resume PTA venlafaxine.  -As needed Ativan     Chronic diastolic CHF  History of sick sinus syndrome status post PPM, recent battery change  Paroxysmal atrial fibrillation  Coronary artery disease  Chronically elevated troponin  Hypertension  Dyslipidemia  -Holding PTA diltiazem, Lasix, Imdur, metolazone, Aldactone, lisinopril due to normal blood pressure, dizziness and slight drop in blood pressure with standing, mild DONA, gradually reintroduce as blood pressure and renal function allows  -Resume PTA aspirin, Xarelto, statin  -Does have chronically elevated troponin, however troponin slightly elevated then around his baseline of 40.  Getting echocardiogram as above  -Monitor in telemetry  -I's and O's, daily weight  -Resumed PTA diltiazem on 10/19     History of DVT and PE on Xarelto  -PTA Xarelto resumed     COPD  -PTA inhalers resumed     Acute kidney injury on chronic kidney disease stage IIIa, resolved  -Baseline creatinine of around 1.1, presented with a creatinine of 1.39  -Received fluid boluses at admission and on 10/19.  Creatinine normalized on 10/19.  -Monitor labs, holding PTA antihypertensives/diuretics as above, gradually reintroduce as creatinine and blood pressure allows  -Avoid nephrotoxin  -Monitor     Chronic lymphedema  -Uses lymphedema wraps  -Holding diuretics as above  -Outpatient follow-up     Chronic anemia  -Recent hemoglobin around 12.  Patient denies any active bleeding.  Hemoglobin at presentation at baseline.  -Monitor intermittently     Lower back pain  -Denies any history of fall but reports his back pain has been gradually getting worse  -X-ray of lumbar spine with chronic degenerative changes, no acute findings.     Type 2 diabetes mellitus  -Not on any medication PTA, most recent hemoglobin A1c 6 on January  2024.  Blood sugar-on admission was 84  -Outpatient follow-up        Diet:  Cardiac  DVT Prophylaxis: DOAC  Reddy Catheter: Not present  Lines: None     Cardiac Monitoring: None  Code Status:  Full code, discussed with patient      Medically Ready for Discharge: Anticipated Tomorrow, pending BP trend      Medical Decision Making       Please see A&P for additional details of medical decision making.        Clinically Significant Risk Factors Present on Admission          # Hypochloremia: Lowest Cl = 94 mmol/L in last 2 days, will monitor as appropriate       # Drug Induced Coagulation Defect: home medication list includes an anticoagulant medication  # Drug Induced Platelet Defect: home medication list includes an antiplatelet medication   # Hypertension: Noted on problem list  # Chronic heart failure with preserved ejection fraction: heart failure noted on problem list and last echo with EF >50%        # Obesity: Estimated body mass index is 34.48 kg/m  as calculated from the following:    Height as of this encounter: 1.829 m (6').    Weight as of this encounter: 115.3 kg (254 lb 3.2 oz).        # Pacemaker present         Mariam Campoverde MD  Text Page (7am - 6pm, M-F)  [facility]  Securely message with the Vocera Web Console (learn more here)  Text page via [employer] Paging/Directory      Interval History   Stable overnight.  Feeling better today.  No chest pain or shortness of breath.  Has chronic leg swelling.    -Data reviewed today: I reviewed all new labs and imaging results over the last 24 hours. I personally reviewed EKG with result as noted above and CT scan with result as noted above    Physical Exam   Temp: 97.5  F (36.4  C) Temp src: Oral BP: 120/73 Pulse: 72   Resp: 16 SpO2: 93 % O2 Device: None (Room air)    Vitals:    10/18/24 1226 10/18/24 1750   Weight: 114.3 kg (252 lb) 115.3 kg (254 lb 3.2 oz)     Vital Signs with Ranges  Temp:  [97  F (36.1  C)-97.6  F (36.4  C)] 97.5  F  (36.4  C)  Pulse:  [60-81] 72  Resp:  [11-18] 16  BP: (110-120)/(59-73) 120/73  SpO2:  [93 %-96 %] 93 %  I/O last 3 completed shifts:  In: 240 [P.O.:240]  Out: 1370 [Urine:1370]    Constitutional: Alert, appears comfortable, in no acute distress  Respiratory: Non labored breathing, clear to auscultation bilaterally, no crackles or wheezes  Cardiovascular: Heart sounds regular rate and rhythm, bilateral lower extremity chronic edema noted, chronic venous stasis changes  GI: Abdomen is soft, non distended, non tender. Normal BS  Neuro: alert, converses appropriately, moving all extremities, fluent speech, no facial asymmetry  Psych: mood and affect appropriate      Medications   Current Facility-Administered Medications   Medication Dose Route Frequency Provider Last Rate Last Admin    Patient is already receiving anticoagulation with heparin, enoxaparin (LOVENOX), warfarin (COUMADIN)  or other anticoagulant medication   Does not apply Continuous PRN Yvonne Gibson MD         Current Facility-Administered Medications   Medication Dose Route Frequency Provider Last Rate Last Admin    acetaminophen (TYLENOL) tablet 1,000 mg  1,000 mg Oral TID Yvonne Gibson MD   1,000 mg at 10/19/24 0809    aspirin (ASA) chewable tablet 81 mg  81 mg Oral Daily Yvonne Gibson MD   81 mg at 10/19/24 0809    atorvastatin (LIPITOR) tablet 80 mg  80 mg Oral QPM Yvonne Gibson MD   80 mg at 10/18/24 1945    fluticasone-vilanterol (BREO ELLIPTA) 200-25 MCG/ACT inhaler 1 puff  1 puff Inhalation Daily Yvonne Gibson MD   1 puff at 10/19/24 0808    And    umeclidinium (INCRUSE ELLIPTA) 62.5 MCG/ACT inhaler 1 puff  1 puff Inhalation Daily Yvonne Gibson MD   1 puff at 10/19/24 0808    levothyroxine (SYNTHROID/LEVOTHROID) tablet 175 mcg  175 mcg Oral Once per day on Monday Tuesday Wednesday Thursday Friday Saturday Yvonne Gibson MD   175 mcg at 10/19/24 0703    rivaroxaban ANTICOAGULANT (XARELTO) tablet 20 mg  20 mg Oral Daily with breakfast  Yvonne Gibson MD   20 mg at 10/19/24 0809    sodium chloride 0.9% BOLUS 500 mL  500 mL Intravenous Once Mariam Campoverde  mL/hr at 10/19/24 1016 500 mL at 10/19/24 1016    tamsulosin (FLOMAX) capsule 0.4 mg  0.4 mg Oral Daily Yvonne Gibson MD   0.4 mg at 10/19/24 0809    venlafaxine (EFFEXOR) tablet 75 mg  75 mg Oral BID Yvonne Gibson MD   75 mg at 10/19/24 0809       Data   Recent Labs   Lab 10/19/24  0647 10/19/24  0212 10/18/24  1248   WBC 8.5  --  9.5   HGB 12.2*  --  12.4*   MCV 98  --  97     --  186     --  135   POTASSIUM 3.7  --  4.1   CHLORIDE 101  --  94*   CO2 27  --  30*   BUN 20.6  --  27.6*   CR 1.16  --  1.39*   ANIONGAP 8  --  11   LAWSON 9.3  --  9.5   GLC 98 94 84   ALBUMIN  --   --  4.2   PROTTOTAL  --   --  7.0   BILITOTAL  --   --  0.3   ALKPHOS  --   --  125   ALT  --   --  26   AST  --   --  31       Imaging  Recent Results (from the past 24 hour(s))   XR Chest 2 Views    Narrative    CHEST TWO VIEWS   10/18/2024 1:52 PM     HISTORY: Cough, shortness of breath.     COMPARISON: 4/22/2024.      Impression    IMPRESSION: Increased patchy opacity at the medial right lung base.  This could represent developing airspace disease including pneumonia.  Normal cardiac silhouette. Stable left chest pacer. Bilateral shoulder  arthroplasties.    QUINN ROBLERO MD         SYSTEM ID:  [id number]   Chest CT w/o contrast    Narrative    EXAM: CT CHEST W/O CONTRAST  LOCATION: [facility]  DATE: 10/18/2024    INDICATION: Follow-up shortness of breath and abnormal chest radiograph.  COMPARISON: 10/18/2024 chest radiograph.  TECHNIQUE: CT chest without IV contrast. Multiplanar reformats were obtained. Dose reduction techniques were used.  CONTRAST: None.    FINDINGS:   LUNGS AND PLEURA: Hypoexpanded lungs. Mild bandlike basilar atelectasis. Mild emphysema. Few small pulmonary nodules. Examples: 2 mm left upper lobe (series 3, image 79) and 3 mm right upper lobe  subpleural nodule which is likely calcified (image 86). No   pleural effusion or pneumothorax.    MEDIASTINUM/AXILLAE: No adenopathy. Nonaneurysmal aorta. Upper normal heart size. No pericardial effusion. Pacemaker.    CORONARY ARTERY CALCIFICATION: Severe.    UPPER ABDOMEN: No actionable findings.    MUSCULOSKELETAL: Shoulder arthroplasties.      Impression    IMPRESSION:     1.  No evidence for pneumonia. Mild basilar atelectasis.    2.  Tiny pulmonary nodules. 12 month follow-up chest CT could be considered.    3.  Mild emphysema.    4.  Severe coronary calcifications.    REFERENCE:  Guidelines for Management of Incidental Pulmonary Nodules Detected on CT Images: From the Fleischner Society 2017.   Guidelines apply to incidental nodules in patients who are 35 years or older.  Guidelines do not apply to lung cancer screening, patients with immunosuppression, or patients with known primary cancer.    MULTIPLE NODULES  Nodule size less than or equal to 6 mm  Low-risk patients: No follow-up needed.  High-risk patients: Optional follow-up at 12 months.    Nodule size 6 mm or larger  Low-risk patients: Follow-up CT at 3-6 months, then consider CT at 18-24 months.  High-risk patients: Follow-up CT at 3-6 months, then at 18-24 months if no change.  -Use most suspicious nodule as guide to management.         XR Thoracic Lumbar Standing 2 Views    Narrative    EXAM: XR THORACIC LUMBAR STANDING 2 VIEWS  LOCATION: [facility]  DATE: 10/18/2024    INDICATION: Back pain  COMPARISON: None.      Impression    IMPRESSION: Five lumbar vertebral bodies. Mild dextrocurvature. Grade 1 anterolisthesis L4-L5. Normal vertebral heights and alignment. Mild multilevel degenerative disc and facet disease. Moderate degenerative changes bilateral sacroiliac joints.

## 2024-10-19 NOTE — PLAN OF CARE
Observation goals  PRIOR TO DISCHARGE        Comments:   -Diagnostic tests and consults completed and resulted- Met  -Vital signs normal or at patient baseline- not met, orthostatics positive  -Dyspnea improved and O2 sats greater than 88% on room air or prior home oxygen levels-Met  -Safe disposition plan has been identified-Not met  Nurse to notify provider when observation goals have been met and patient is ready for discharge.

## 2024-10-19 NOTE — PLAN OF CARE
Goal Outcome Evaluation:      Plan of Care Reviewed With: patient    Overall Patient Progress: improving    Observation goals  PRIOR TO DISCHARGE        Comments:   -Diagnostic tests and consults completed and resulted-Not met, Echo pending  -Vital signs normal or at patient baseline  -Dyspnea improved and O2 sats greater than 88% on room air or prior home oxygen levels-Met  -Safe disposition plan has been identified-Not met  Nurse to notify provider when observation goals have been met and patient is ready for discharge.

## 2024-10-19 NOTE — PROGRESS NOTES
diagnostic tests and consults completed and resulted  not met, PT/OT, T-L spine X-ray and Echo pending  -vital signs normal or at patient baseline  not met, orthostatics +  -dyspnea improved and O2 sats greater than 88% on room air or prior home oxygen levels met  -safe disposition plan has been identified not met pending PT/OT  Nurse to notify provider when observation goals have been met and patient is ready for discharge       Top portion must ALWAYS be completed  PRIMARY Concern: Dizziness and near syncope  SAFETY RISK Concerns (fall risk, behaviors, etc.): Fall risk      Aggression Tool Color: Green  Isolation/Type: None  Tests/Procedures for NEXT shift: Echo pending  Consults? (Pending/following, signed-off?)  PT and OT pending  Where is patient from? (Home, TCU, etc.): Independent living  Other Important info for NEXT shift: Orthostatics +, 1 L bolus given, please recheck in the AM  Anticipated DC date & active delays:  pending above work up  _____________________________________________________________________________  SUMMARY NOTE:              (either paste note here OR complete the info below- RN to choose one- delete info below if not used)  Orientation/Cognitive: A&OX4  Observation Goals (Met/ Not Met): not met  Mobility Level/Assist Equipment: AX1 with GB and walker  Antibiotics & Plan (IV/po, length of tx left):  None  Pain Management: denies  Tele/VS/O2: VSS on RA, tele was SR  ABNL Lab/BG: Crt up at 1.39, trops elevated but flat  Diet: Cardiac, vegetarian  Bowel/Bladder: continent  Skin Concerns: Scattered bruising  Drains/Devices: PIV Sled  Patient Stated Goal for Today:  to rest

## 2024-10-19 NOTE — PROGRESS NOTES
"   10/19/24 0900   Appointment Info   Signing Clinician's Name / Credentials (PT) Mika Santillan DPT       Present no   Living Environment   People in Home alone   Current Living Arrangements independent living facility   Home Accessibility no concerns   Transportation Anticipated family or friend will provide   Living Environment Comments Pt lives in an ILF. No stairs. Pt reports a family member will pick pt up upon discharge and provide assist as needed.   Self-Care   Usual Activity Tolerance good   Current Activity Tolerance moderate   Regular Exercise No   Equipment Currently Used at Home cane, straight;walker, rolling   Fall history within last six months no   Activity/Exercise/Self-Care Comment Pt reports being IND at baseline with all ADLs. Pt ambulates w/ a SEC at baseline but has a FWW if needed. Pt drives.   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   Existing Precautions/Restrictions fall   Weight-Bearing Status - LLE full weight-bearing   Weight-Bearing Status - RLE full weight-bearing   Cognition   Orientation Status (Cognition) oriented x 4   Pain Assessment   Patient Currently in Pain No   Integumentary/Edema "   Integumentary/Edema no deficits were identifed   Posture    Posture Forward head position;Protracted shoulders   Range of Motion (ROM)   Range of Motion ROM is WFL   Strength (Manual Muscle Testing)   Strength (Manual Muscle Testing) Able to perform R SLR;Able to perform L SLR   Bed Mobility   Comment, (Bed Mobility) Supine>sit w/ SBA   Transfers   Comment, (Transfers) Sit>Stand w/ FWW and SBA   Gait/Stairs (Locomotion)   [place] Level (Gait) supervision   Assistive Device (Gait) walker, front-wheeled   Distance in Feet (Gait) 5'   Balance   Balance Comments Adequate static sitting balance; pt ambulates w/ a FWW   Sensory Examination   Sensory Perception patient reports no sensory changes   Clinical Impression   Criteria for Skilled Therapeutic Intervention Yes, treatment indicated   PT Diagnosis (PT) Impaired gait   Influenced by the following impairments Decreased activity tolerance; decreased balance; orthostatic hypotension   Functional limitations due to impairments Impaired functional mobility   Clinical Presentation (PT Evaluation Complexity) stable   Clinical Presentation Rationale Clinical judgement   Clinical Decision Making (Complexity) low complexity   Planned Therapy Interventions (PT) balance training;bed mobility training;gait training;patient/family education;strengthening;transfer training;progressive activity/exercise   Risk & Benefits of therapy have been explained evaluation/treatment results reviewed;care plan/treatment goals reviewed;risks/benefits reviewed;current/potential barriers reviewed;participants voiced agreement with care plan;participants included;patient   PT Total Evaluation Time   PT Eval, Low Complexity Minutes (82115) 10   Physical Therapy Goals   PT Frequency One time eval and treatment only   PT Predicted Duration/Target Date for Goal Attainment 10/22/24   PT Goals Bed Mobility;Transfers;Gait   PT: Bed Mobility Supervision/stand-by assist;Supine to/from sit;Goal Met    PT: Transfers Supervision/stand-by assist;Sit to/from stand;Assistive device;Goal Met   PT: Gait Supervision/stand-by assist;Assistive device;100 feet;Goal Met   Interventions   Interventions Quick Adds Gait Training;Therapeutic Activity   Therapeutic Activity   Therapeutic Activities: dynamic activities to improve functional performance Minutes (39172) 15   Symptoms Noted During/After Treatment Dizziness   Treatment Detail/Skilled Intervention Greeted pt supine in bed, agreed to PT. VSS on RA throughout session. Orthostatic hypotension assessed, pt positive, see VSFS for details. Pt performed supine>sit w/ SBA. Once in sitting, pt able to scoot self to EOB and sit unsupported without LOB. Pt performed sit>stand x 5 w/ FWW and SBA, cues for hand placement. After ambulation, pt returned to supine in bed w/ SBA, demosntrating ability to safely lift BLEs back into bed and reposition self. Pt ended session supine in bed, with all needs met and call light within reach.   Gait Training   Gait Training Minutes (33024) 13   Symptoms Noted During/After Treatment (Gait Training) dizziness   Treatment Detail/Skilled Intervention Pt ambulated w/ FWW and SBA. Pt ambulated with decreased gait speed, downward gaze, and decreased step length. Verbal cues for upright gaze and posture, to increase step length, and to reduce reliance on FWW. Good carryover with cues. No LOB noted and pt tolerated well. Reporting mild dizziness throughout.   Distance in Feet 150'   PT Discharge Planning   PT Plan DC   PT Discharge Recommendation (DC Rec) home;home with assist   PT Rationale for DC Rec Pt appears near baseline for functional mobility. Pt currently performing all transfers and ambulation w/ FWW and SBA. Anticipate at time of discharge pt will be able to safely return home at discharge. Pt has access to assist if needed. Recommend pt use a FWW at discharge and pt reports he already owns one.   PT Brief overview of current status Goals  of therapy will be to address safe mobility and make recs for d/c to next level of care. Pt and RN will continue to follow all falls risk precautions as documented by RN staff while hospitalized. SBA w/ FWW   Total Session Time   Timed Code Treatment Minutes 28   Total Session Time (sum of timed and untimed services) 38       Physical Therapy Discharge Summary    Reason for therapy discharge:    All goals and outcomes met, no further needs identified.    Progress towards therapy goal(s). See goals on Care Plan in [facility] electronic health record for goal details.  Goals met    Therapy recommendation(s):    Recommend pt continue to ambulate with nursing staff using a FWW throughout remainder of hospital stay.

## 2024-10-19 NOTE — PLAN OF CARE
PRIMARY Concern: Here with dizziness and near syncope, orthostatic positive   SAFETY RISK Concerns (fall risk, behaviors, etc.): fall risk  Isolation/Type: n/a  Tests/Procedures for NEXT shift:   Consults? (Pending/following, signed-off?) PT/OT consults pending  Where is patient from? (Home, TCU, etc.): home  Other Important info for NEXT shift: Recheck orthostatic this evening   Anticipated DC date & active delays: TBD  Aggression stop tool:Green  _____________________________________________________________________________  SUMMARY NOTE:  Orientation/Cognitive: A&OX4  Observation Goals (Met/ Not Met): Not met,   Mobility Level/Assist Equipment:AX1GB/Cane  Antibiotics & Plan (IV/po, length of tx left): n/a  Pain Management: Denies any pain, PRN Tyl available  Tele/VS/O2: Tele SR   ABNL Lab/BG: None this shift  Diet: Vegetarian cardiac no caffeine diet  Bowel/Bladder: Continent, urinary frequency, using urinal at bedside, no BM this shift  Skin Concerns: scattered bruising  Drains/Devices: PIV s/l on right hand  Patient Stated Goal for Today:  none

## 2024-10-19 NOTE — PLAN OF CARE
Observation goals  PRIOR TO DISCHARGE        Comments:   -Diagnostic tests and consults completed and resulted-Not met, Echo pending  -Vital signs normal or at patient baseline- not met, orthostatics positive  -Dyspnea improved and O2 sats greater than 88% on room air or prior home oxygen levels-Met  -Safe disposition plan has been identified-Not met  Nurse to notify provider when observation goals have been met and patient is ready for discharge.

## 2024-10-19 NOTE — PLAN OF CARE
Goal Outcome Evaluation:   Plan of Care Reviewed With: patient  Overall Patient Progress: improving    Observation goals  PRIOR TO DISCHARGE        Comments:   -Diagnostic tests and consults completed and resulted-Not met, Echo pending  -Vital signs normal or at patient baseline  -Dyspnea improved and O2 sats greater than 88% on room air or prior home oxygen levels-Met  -Safe disposition plan has been identified-Not met  Nurse to notify provider when observation goals have been met and patient is ready for discharge.        PRIMARY Concern: Here with dizziness and near syncope, orthostatic +ve  SAFETY RISK Concerns (fall risk, behaviors, etc.): fall risk  Isolation/Type: n/a  Tests/Procedures for NEXT shift: Echo, BMP, CBC  Consults? (Pending/following, signed-off?) PT/OT consults pending  Where is patient from? (Home, TCU, etc.): home  Other Important info for NEXT shift: Recheck orthostatic and update MD this morning  Anticipated DC date & active delays: TBD  Aggression stop tool:Green  _____________________________________________________________________________  SUMMARY NOTE:  Orientation/Cognitive: A&OX4  Observation Goals (Met/ Not Met): Not met, Echo and consults pending  Mobility Level/Assist Equipment:AX1GB/Cane  Antibiotics & Plan (IV/po, length of tx left): n/a  Pain Management: Denies any pain, PRN Tyl available  Tele/VS/O2: Tele SR w 1st degree AVB  ABNL Lab/BG: None this shift  Diet: Vegetarian cardiac no caffeine diet  Bowel/Bladder: Continent, urinary frequency, using urinal at bedside, no BM this shift  Skin Concerns: scattered bruising  Drains/Devices: PIV s/l  Patient Stated Goal for Today:  none

## 2024-10-19 NOTE — PROGRESS NOTES
Occupational Therapy: Orders received. Chart reviewed and discussed with care team.? Occupational Therapy not indicated due to pt at or near baseline and is alert/oriented x4.  No IP OT needs at this time.? Defer discharge recommendations to treatment team.? Will complete orders.

## 2024-10-20 VITALS
SYSTOLIC BLOOD PRESSURE: 135 MMHG | RESPIRATION RATE: 16 BRPM | WEIGHT: 254.41 LBS | HEIGHT: 72 IN | TEMPERATURE: 97.4 F | DIASTOLIC BLOOD PRESSURE: 70 MMHG | HEART RATE: 67 BPM | BODY MASS INDEX: 34.46 KG/M2 | OXYGEN SATURATION: 97 %

## 2024-10-20 PROCEDURE — 250N000013 HC RX MED GY IP 250 OP 250 PS 637: Performed by: INTERNAL MEDICINE

## 2024-10-20 PROCEDURE — 250N000013 HC RX MED GY IP 250 OP 250 PS 637: Performed by: HOSPITALIST

## 2024-10-20 PROCEDURE — 99239 HOSP IP/OBS DSCHRG MGMT >30: CPT | Performed by: HOSPITALIST

## 2024-10-20 PROCEDURE — G0378 HOSPITAL OBSERVATION PER HR: HCPCS

## 2024-10-20 RX ORDER — POTASSIUM CHLORIDE 750 MG/1
10 TABLET, EXTENDED RELEASE ORAL 2 TIMES DAILY
Status: DISCONTINUED | OUTPATIENT
Start: 2024-10-20 | End: 2024-10-20 | Stop reason: HOSPADM

## 2024-10-20 RX ORDER — LEVOTHYROXINE SODIUM 175 UG/1
175 TABLET ORAL
Status: SHIPPED
Start: 2024-10-21 | End: 2024-10-31

## 2024-10-20 RX ORDER — FUROSEMIDE 20 MG/1
20 TABLET ORAL 2 TIMES DAILY
Status: DISCONTINUED | OUTPATIENT
Start: 2024-10-20 | End: 2024-10-20 | Stop reason: HOSPADM

## 2024-10-20 RX ADMIN — RIVAROXABAN 20 MG: 10 TABLET, FILM COATED ORAL at 08:35

## 2024-10-20 RX ADMIN — POTASSIUM CHLORIDE 10 MEQ: 750 TABLET, EXTENDED RELEASE ORAL at 11:24

## 2024-10-20 RX ADMIN — FLUTICASONE FUROATE AND VILANTEROL TRIFENATATE 1 PUFF: 200; 25 POWDER RESPIRATORY (INHALATION) at 08:34

## 2024-10-20 RX ADMIN — DILTIAZEM HYDROCHLORIDE 300 MG: 180 CAPSULE, EXTENDED RELEASE ORAL at 08:35

## 2024-10-20 RX ADMIN — FUROSEMIDE 20 MG: 20 TABLET ORAL at 11:24

## 2024-10-20 RX ADMIN — ASPIRIN 81 MG CHEWABLE TABLET 81 MG: 81 TABLET CHEWABLE at 08:35

## 2024-10-20 RX ADMIN — ACETAMINOPHEN 1000 MG: 500 TABLET, FILM COATED ORAL at 08:35

## 2024-10-20 RX ADMIN — VENLAFAXINE 75 MG: 37.5 TABLET ORAL at 08:35

## 2024-10-20 RX ADMIN — UMECLIDINIUM 1 PUFF: 62.5 AEROSOL, POWDER ORAL at 08:34

## 2024-10-20 ASSESSMENT — ACTIVITIES OF DAILY LIVING (ADL)
ADLS_ACUITY_SCORE: 40

## 2024-10-20 NOTE — PROGRESS NOTES
Observation goals  PRIOR TO DISCHARGE       Comments:   -Diagnostic tests and consults completed and resulted- Met  -Vital signs normal or at patient baseline-  met,   -Dyspnea improved and O2 sats greater than 88% on room air or prior home oxygen levels-Met  -Safe disposition plan has been identified-Not met  Nurse to notify provider when observation goals have been met and patient is ready for discharge.

## 2024-10-20 NOTE — DISCHARGE SUMMARY
PRIMARY Concern: dizziness and near syncope,   SAFETY RISK Concerns (fall risk, behaviors, etc.): fall risk  Isolation/Type: n/a  Tests/Procedures for NEXT shift:   Consults? (Pending/following, signed-off?) PT rec home with assist  Where is patient from? (Home, TCU, etc.): home  Other Important info for NEXT shift: n/a  Anticipated DC date & active delays: 10/20/24  Aggression stop tool:Green  _____________________________________________________________________________  SUMMARY NOTE:  Orientation/Cognitive: A&OX4  Observation Goals (Met/ Not Met): Met  Mobility Level/Assist :AX1GB/Cane  Antibiotics & Plan (IV/po, length of tx left): n/a  Pain Management: Denies any pain, Tele/VS/O2: VSS on RA, ortho negative in this shift, Tele SR   ABNL Lab/BG: None this shift  Diet:  cardiac no caffeine   Bowel/Bladder: Continent, using urinal at bedside, no BM this shift  Skin Concerns: scattered bruising  Drains/Devices: None   Patient Stated Goal for Today:  sleep    Patient cleared to discharge home by hospitalist. AVS printed and reviewed with patient. PIV removed and tele removed. Patient changed into home clothes and all belongings gathered. Patient brought down to door 6.

## 2024-10-20 NOTE — CARE PLAN
PRIMARY Concern: dizziness and near syncope,   SAFETY RISK Concerns (fall risk, behaviors, etc.): fall risk  Isolation/Type: n/a  Tests/Procedures for NEXT shift:   Consults? (Pending/following, signed-off?) PT rec home with assist  Where is patient from? (Home, TCU, etc.): home  Other Important info for NEXT shift: n/a  Anticipated DC date & active delays: TBD  Aggression stop tool:Green  _____________________________________________________________________________  SUMMARY NOTE:  Orientation/Cognitive: A&OX4  Observation Goals (Met/ Not Met): Not met,   Mobility Level/Assist :AX1GB/Cane  Antibiotics & Plan (IV/po, length of tx left): n/a  Pain Management: Denies any pain, Tele/VS/O2: VSS on RA, ortho negative in this shift, Tele SR   ABNL Lab/BG: None this shift  Diet:  cardiac no caffeine   Bowel/Bladder: Continent, using urinal at bedside, no BM this shift  Skin Concerns: scattered bruising  Drains/Devices: PIV s/l   Patient Stated Goal for Today:  sleep

## 2024-10-20 NOTE — DISCHARGE SUMMARY
Discharge Summary  Hospitalist    Date of Admission:  10/18/2024  Date of Discharge:  10/20/2024  Discharging Provider: Mariam Campoverde MD, MD  Date of Service (when I saw the patient): 10/20/24    Discharge Diagnoses   Dyspnea on exertion  Dizziness and near syncopal episode  Generalized weakness    History of Present Illness   Please refer H & P for details.      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     Dyspnea on exertion  Dizziness and near syncopal episode  Generalized weakness  -Presents with dizziness, dyspnea on exertion and near syncopal episodes going on for about 2 weeks.  No history of fall or syncope  -Chest x-ray shows-Increased patchy opacity in the medial right lung base.  Patient however denies fever at home, does have chronic morning cough with whitish phlegm but no changes in his cough.  No evidence of leukocytosis and procalcitonin normal.  CT chest was done on 10/18 that did not show any evidence of pneumonia.  Tiny pulmonary nodules noted that could be followed up as outpatient per PCP-recommend 12-month CT follow-up.  -Troponin is chronically elevated around 40s but slightly more elevated on arrival than at his baseline to 60 on arrival and trending down at 55 on repeat check  -Patient without any chest pain and EKG with no ischemic changes  -ED provider had done device check which was unremarkable per report  -Check echocardiogram-10/19: No significant changes from prior: Normal LV, proximal septal  thickening as before, EF 60-65%, no regional WMA, normal RV  -Continue to monitor in telemetry  -Orthostatic vitals were positive and significant.  Suspect that patient is not tolerating his antihypertensive regimen [is currently on 6 antihypertensives including 3 diuretics: Metolazone, spironolactone, Lasix, lisinopril, Imdur, diltiazem].  All antihypertensives have been held.  Resumed PTA diltiazem on 10/19.  Received IV NS bolus at admission as well as on 10/19.  Resumed PTA Lasix 20 Mg twice daily on 10/20.  Plan to discharge on diltiazem and Lasix alone.  Will need close follow-up with PCP.  Instructed patient to check BP every morning and maintain log.  Will need to add back other medications if/as BP increases.  -PT OT evaluation and treatment-Home with assist  -Fall precaution     Generalized anxiety  -Resume PTA venlafaxine.  -As needed Ativan     Chronic diastolic CHF  History of sick sinus syndrome status post PPM, recent battery change  Paroxysmal atrial fibrillation  Coronary artery disease  Chronically elevated troponin  Hypertension  Dyslipidemia  -Holding PTA diltiazem, Lasix, Imdur, metolazone, Aldactone, lisinopril due to normal blood pressure, dizziness and slight drop in blood pressure with standing, mild DONA, gradually reintroduce as blood pressure and renal function allows  -Resume PTA aspirin, Xarelto, statin  -Does have chronically elevated troponin, however troponin slightly elevated then around his baseline of 40.  Echocardiogram as above  -Monitor in telemetry  -I's and O's, daily weight  -Resumed PTA diltiazem on 10/19, PTA Lasix on 10/20     History of DVT and PE on Xarelto  -PTA Xarelto resumed     COPD  -PTA inhalers resumed     Acute kidney injury on chronic kidney disease stage IIIa, resolved  -Baseline creatinine of around 1.1, presented with a creatinine of 1.39  -Received fluid boluses at admission and on 10/19.  Creatinine normalized on 10/19.  -Monitor labs, holding PTA  antihypertensives/diuretics as above, gradually reintroduce as creatinine and blood pressure allows  -Avoid nephrotoxin  -Monitor     Chronic lymphedema  -Uses lymphedema wraps  -Holding diuretics as above.  Lasix resumed at discharge.  -Outpatient follow-up     Chronic anemia  -Recent hemoglobin around 12.  Patient denies any active bleeding.  Hemoglobin at presentation at baseline.  -Monitor intermittently     Lower back pain  -Denies any history of fall but reports his back pain has been gradually getting worse  -X-ray of lumbar spine with chronic degenerative changes, no acute findings.     Type 2 diabetes mellitus  -Not on any medication PTA, most recent hemoglobin A1c 6 on January 2024.  Blood sugar-on admission was 84  -Outpatient follow-up      Mariam Campoverde MD, MD      Pending Results   These results will be followed up by Hospitalist team.  Unresulted Labs Ordered in the Past 30 Days of this Admission       No orders found from 9/18/2024 to 10/19/2024.            Code Status   Full Code       Primary Care Physician   Anne Heath    Follow-ups Needed After Discharge   Follow-up Appointments     Follow-up and recommended labs and tests       Follow up with primary care provider, Anne Heath, within 7 days for   hospital follow- up.  No follow up labs or test are needed.            Physical Exam   Temp: 97.4  F (36.3  C) Temp src: Oral BP: 135/70 Pulse: 67   Resp: 16 SpO2: 97 % O2 Device: None (Room air)    Vitals:    10/18/24 1750 10/20/24 0629 10/20/24 0738   Weight: 115.3 kg (254 lb 3.2 oz) 114.6 kg (252 lb 9.6 oz) 115.4 kg (254 lb 6.6 oz)     Vital Signs with Ranges  Temp:  [97.3  F (36.3  C)-98  F (36.7  C)] 97.4  F (36.3  C)  Pulse:  [61-70] 67  Resp:  [16-18] 16  BP: (109-135)/(63-76) 135/70  SpO2:  [94 %-97 %] 97 %  I/O last 3 completed shifts:  In: 540 [P.O.:540]  Out: 750 [Urine:750]    Constitutional: Alert, appears comfortable, in no acute distress  Respiratory: Non labored breathing,  clear to auscultation bilaterally, no crackles or wheezes  Cardiovascular: Heart sounds regular rate and rhythm, bilateral lower extremity chronic edema noted, chronic venous stasis changes  GI: Abdomen is soft, non distended, non tender. Normal BS  Neuro: alert, converses appropriately, moving all extremities, fluent speech, no facial asymmetry  Psych: mood and affect appropriate          Discharge Disposition   Discharged to home  Condition at discharge: Stable    Consultations This Hospital Stay   PHYSICAL THERAPY ADULT IP CONSULT  OCCUPATIONAL THERAPY ADULT IP CONSULT  VASCULAR ACCESS ADULT IP CONSULT    Time Spent on this Encounter   IMariam MD, personally saw the patient today and spent greater than 30 minutes discharging this patient.    Discharge Orders      Primary Care - Care Coordination Referral      Reason for your hospital stay    Weakness, low blood pressure     Follow-up and recommended labs and tests     Follow up with primary care provider, Anne Heath, within 7 days for hospital follow- up.  No follow up labs or test are needed.     Activity    Your activity upon discharge: activity as tolerated     Monitor and record    Blood pressure: daily, keep a record and bring record to next doctor appointment.     When to contact your care team    Call your primary doctor if you have any of the following: Worsening lightheadedness, weakness, tiredness, dizziness, chest pain, leg swelling, shortness of breath, fever     Discharge Instructions    If your blood pressure is persistently greater than 140/90 for more than 3 days, you can restart taking your lisinopril at 20 mg daily dosing.  Call your primary care doctor's office for further instructions.     Diet    Follow this diet upon discharge: Current Diet:Orders Placed This Encounter      Combination Diet Low Saturated Fat Na <2400mg Diet     Discharge Medications   Discharge Medication List as of 10/20/2024 11:05 AM        CONTINUE these  medications which have CHANGED    Details   levothyroxine (SYNTHROID/LEVOTHROID) 175 MCG tablet Take 1 tablet (175 mcg) by mouth six times a week. 350 mcg on Fridays, No Print Out           CONTINUE these medications which have NOT CHANGED    Details   acetaminophen (TYLENOL) 500 MG tablet Take 1,000 mg by mouth 3 times daily, Historical      albuterol (PROAIR HFA/PROVENTIL HFA/VENTOLIN HFA) 108 (90 Base) MCG/ACT inhaler Inhale 2 puffs into the lungs every 6 hours as needed for shortness of breath, wheezing or cough., Historical      aspirin (ASA) 81 MG chewable tablet Take 1 tablet by mouth daily, Historical      atorvastatin (LIPITOR) 80 MG tablet Take 1 tablet (80 mg) by mouth every evening, Disp-90 tablet, R-4, E-Prescribe      diltiazem ER COATED BEADS (CARDIZEM CD/CARTIA XT) 300 MG 24 hr capsule Take 1 capsule (300 mg) by mouth daily, Disp-90 capsule, R-3, E-Prescribe      ferrous sulfate (SLOW FE) 142 (45 Fe) MG CR tablet TAKE ONE TABLET BY MOUTH EVERY OTHER DAY, Disp-45 tablet, R-1, E-Prescribe      Fluticasone-Umeclidin-Vilanterol (TRELEGY ELLIPTA) 200-62.5-25 MCG/INH oral inhaler Inhale 1 puff into the lungs daily, Historical      furosemide (LASIX) 40 MG tablet Take 0.5 tablets (20 mg) by mouth 2 times daily., Disp-90 tablet, R-2, E-Prescribe      Multiple vitamin TABS Take 1 tablet by mouth daily, Historical      polyethylene glycol (MIRALAX) 17 g packet Take 17 g by mouth daily, Disp-7 packet, R-0, Transitional      potassium chloride cammy ER (KLOR-CON M10) 10 MEQ CR tablet Take 1 tablet (10 mEq) by mouth 2 times daily, Disp-180 tablet, R-1, E-Prescribe      tamsulosin (FLOMAX) 0.4 MG capsule Take 1 capsule (0.4 mg) by mouth daily, Disp-90 capsule, R-0, E-Prescribe      venlafaxine (EFFEXOR) 75 MG tablet TAKE 1 TABLET(75 MG) BY MOUTH TWICE DAILY, Disp-180 tablet, R-0, E-Prescribe      XARELTO ANTICOAGULANT 20 MG TABS tablet TAKE 1 TABLET(20 MG) BY MOUTH DAILY WITH DINNER, Disp-30 tablet, R-11,  E-Prescribe           STOP taking these medications       isosorbide mononitrate (IMDUR) 30 MG 24 hr tablet Comments:   Reason for Stopping:         lisinopril (ZESTRIL) 20 MG tablet Comments:   Reason for Stopping:         metolazone (ZAROXOLYN) 2.5 MG tablet Comments:   Reason for Stopping:         spironolactone (ALDACTONE) 25 MG tablet Comments:   Reason for Stopping:             Allergies   No Known Allergies  Data   Most Recent 3 CBC's:  Recent Labs   Lab Test 10/19/24  0647 10/18/24  1248 09/23/24  1246   WBC 8.5 9.5 9.5   HGB 12.2* 12.4* 13.2*   MCV 98 97 100    186 200      Most Recent 3 BMP's:  Recent Labs   Lab Test 10/19/24  0647 10/19/24  0212 10/18/24  1248 09/23/24  1437 09/23/24  1246     --  135  --  137   POTASSIUM 3.7  --  4.1  --  4.2   CHLORIDE 101  --  94*  --  98   CO2 27  --  30*  --  28   BUN 20.6  --  27.6*  --  25.9*   CR 1.16  --  1.39*  --  1.09   ANIONGAP 8  --  11  --  11   LAWSON 9.3  --  9.5  --  10.0   GLC 98 94 84   < > 93    < > = values in this interval not displayed.     Most Recent 2 LFT's:  Recent Labs   Lab Test 10/18/24  1248 07/19/24  0919   AST 31 22   ALT 26 15   ALKPHOS 125 108   BILITOTAL 0.3 0.5     Most Recent INR's and Anticoagulation Dosing History:  Anticoagulation Dose History  More data exists         Latest Ref Rng & Units 2/24/2023 3/6/2023 3/13/2023 3/20/2023 3/27/2023 4/10/2023 5/1/2023   Recent Dosing and Labs   INR 0.9 - 1.1 1.35        Some International Normalized Ratio (INR) results performed at the [facility] for patients 6 months and older reported between 07/11/2021 and 5/17/2022 were evaluated against an outdated reference interval of 0.86-1.14 rather than the intended reference interval of 0.85-1.15. The INR value itself was accurate, but may not have been flagged correctly due to the outdated reference interval. 2.1     2.4     2.3     2.6  C    2.4     2.1          Details         C Corrected result    This result is  from an external source.             Most Recent 3 Troponin's:  Recent Labs   Lab Test 02/01/20  1721 01/22/20  1216   TROPI <0.015 <0.015     Most Recent Cholesterol Panel:  Recent Labs   Lab Test 01/31/24  1026   CHOL 128   LDL 50   HDL 57   TRIG 104     Most Recent 6 Bacteria Isolates From Any Culture (See EPIC Reports for Culture Details):  Recent Labs   Lab Test 06/14/21  1516 12/21/20  1231 12/14/20  1716 08/11/20  1532   CULT No growth <10,000 colonies/mL  urogenital ava  Susceptibility testing not routinely done   <10,000 colonies/mL  mixed urogenital ava  Susceptibility testing not routinely done   50,000 to 100,000 colonies/mL  mixed urogenital ava  Susceptibility testing not routinely done       Most Recent TSH, T4 and A1c Labs:  Recent Labs   Lab Test 07/19/24  0919 02/14/24  0958 01/12/24  1155 12/15/23  0822 10/04/23  1358   TSH 2.41   < > 0.56   < > 0.94   T4  --   --   --   --  1.56   A1C  --   --  6.0*   < >  --     < > = values in this interval not displayed.       Results for orders placed or performed during the hospital encounter of 10/18/24   XR Chest 2 Views    Narrative    CHEST TWO VIEWS   10/18/2024 1:52 PM     HISTORY: Cough, shortness of breath.     COMPARISON: 4/22/2024.      Impression    IMPRESSION: Increased patchy opacity at the medial right lung base.  This could represent developing airspace disease including pneumonia.  Normal cardiac silhouette. Stable left chest pacer. Bilateral shoulder  arthroplasties.    QUINN ROBLERO MD         SYSTEM ID:  [id number]   Chest CT w/o contrast    Narrative    EXAM: CT CHEST W/O CONTRAST  LOCATION: [facility]  DATE: 10/18/2024    INDICATION: Follow-up shortness of breath and abnormal chest radiograph.  COMPARISON: 10/18/2024 chest radiograph.  TECHNIQUE: CT chest without IV contrast. Multiplanar reformats were obtained. Dose reduction techniques were used.  CONTRAST: None.    FINDINGS:   LUNGS AND PLEURA: Hypoexpanded  lungs. Mild bandlike basilar atelectasis. Mild emphysema. Few small pulmonary nodules. Examples: 2 mm left upper lobe (series 3, image 79) and 3 mm right upper lobe subpleural nodule which is likely calcified (image 86). No   pleural effusion or pneumothorax.    MEDIASTINUM/AXILLAE: No adenopathy. Nonaneurysmal aorta. Upper normal heart size. No pericardial effusion. Pacemaker.    CORONARY ARTERY CALCIFICATION: Severe.    UPPER ABDOMEN: No actionable findings.    MUSCULOSKELETAL: Shoulder arthroplasties.      Impression    IMPRESSION:     1.  No evidence for pneumonia. Mild basilar atelectasis.    2.  Tiny pulmonary nodules. 12 month follow-up chest CT could be considered.    3.  Mild emphysema.    4.  Severe coronary calcifications.    REFERENCE:  Guidelines for Management of Incidental Pulmonary Nodules Detected on CT Images: From the Fleischner Society 2017.   Guidelines apply to incidental nodules in patients who are 35 years or older.  Guidelines do not apply to lung cancer screening, patients with immunosuppression, or patients with known primary cancer.    MULTIPLE NODULES  Nodule size less than or equal to 6 mm  Low-risk patients: No follow-up needed.  High-risk patients: Optional follow-up at 12 months.    Nodule size 6 mm or larger  Low-risk patients: Follow-up CT at 3-6 months, then consider CT at 18-24 months.  High-risk patients: Follow-up CT at 3-6 months, then at 18-24 months if no change.  -Use most suspicious nodule as guide to management.         XR Thoracic Lumbar Standing 2 Views    Narrative    EXAM: XR THORACIC LUMBAR STANDING 2 VIEWS  LOCATION: [facility]  DATE: 10/18/2024    INDICATION: Back pain  COMPARISON: None.      Impression    IMPRESSION: Five lumbar vertebral bodies. Mild dextrocurvature. Grade 1 anterolisthesis L4-L5. Normal vertebral heights and alignment. Mild multilevel degenerative disc and facet disease. Moderate degenerative changes bilateral  sacroiliac joints.    Echocardiogram Complete     Value    LVEF  60-65%    [facility]    [id number]  [id number]  [id number]  [id number]^SALVATORE^JARRETT     [facility]  Echocardiography Laboratory  [address] [place], [place] [id number]     Name: KHUSHBOO NINO  MRN: [id number]  : 1940  Study Date: 10/19/2024 02:41 PM  Age: 84 yrs  Gender: Male  Patient Location: [facility]  Reason For Study: Dyspnea  Ordering Physician: JARRETT CHASE  Referring Physician: Edie Heath  Performed By: Audi Maynard     BSA: 2.4 m2  Height: 72 in  Weight: 252 lb  HR: 66  BP: 117/70 mmHg  ______________________________________________________________________________  Procedure  Complete Portable Echo Adult. Optison (NDC #[id number]) given intravenously.  ______________________________________________________________________________  Interpretation Summary     1. The left ventricle is normal in size. Proximal septal thickening is noted.  Left ventricular systolic function is normal. The visual ejection fraction is  60-65%. Diastolic Doppler findings (E/E' ratio and/or other parameters)  suggest left ventricular filling pressures are indeterminate. No regional wall  motion abnormalities noted. There is no thrombus seen in the left ventricle.  2. The right ventricle is normal size. There is a catheter/pacemaker lead seen  in the right ventricle. The right ventricular systolic function is normal.  3. Trace mitral and tricuspid regurgitation.  4. The aortic Sinus(es) of Valsalva are mildly dilated. Ascending aorta  dilatation is present. (4.1 cm).  5. No pericardial effusion.  6. In comparison to the previous report dated 2024, the findings are  similar.  ______________________________________________________________________________  Left Ventricle  The left ventricle is normal in size. Proximal septal thickening is noted.  Left ventricular systolic function is normal. The visual ejection fraction is  60-65%.  Diastolic Doppler findings (E/E' ratio and/or other parameters)  suggest left ventricular filling pressures are indeterminate. No regional wall  motion abnormalities noted. There is no thrombus seen in the left ventricle.     Right Ventricle  The right ventricle is normal size. There is a catheter/pacemaker lead seen in  the right ventricle. The right ventricular systolic function is normal.     Atria  Normal left atrial size. Right atrial size is normal. There is no color  Doppler evidence of an atrial shunt.     Mitral Valve  There is trace mitral regurgitation.     Tricuspid Valve  There is trace tricuspid regurgitation. The right ventricular systolic  pressure is approximated at 17.6 mmHg plus the right atrial pressure.     Aortic Valve  There is mild trileaflet aortic sclerosis. There is trace aortic  regurgitation. No aortic stenosis is present.     Pulmonic Valve  There is no pulmonic valvular stenosis.     Vessels  The aortic Sinus(es) of Valsalva are mildly dilated. Ascending aorta  dilatation is present. (4.1 cm). The inferior vena cava is normal.     Pericardium  There is no pericardial effusion.     Rhythm  Sinus rhythm was noted.  ______________________________________________________________________________  MMode/2D Measurements & Calculations  IVSd: 1.1 cm     LVIDd: 4.6 cm  LVIDs: 2.9 cm  LVPWd: 1.1 cm  FS: 36.4 %  LV mass(C)d: 180.0 grams  LV mass(C)dI: 76.6 grams/m2  Ao root diam: 4.0 cm  asc Aorta Diam: 4.1 cm  LVOT diam: 2.6 cm  LVOT area: 5.4 cm2  Ao root diam index Ht(cm/m): 2.2  Ao root diam index BSA (cm/m2): 1.7  Asc Ao diam index BSA (cm/m2): 1.7  Asc Ao diam index Ht(cm/m): 2.2  RV Base: 3.7 cm  RWT: 0.48  TAPSE: 2.8 cm     Doppler Measurements & Calculations  MV E max margie: 78.8 cm/sec  MV A max margie: 83.1 cm/sec  MV E/A: 0.95     MV dec slope: 319.9 cm/sec2  MV dec time: 0.25 sec  Ao V2 max: 137.1 cm/sec  Ao max P.0 mmHg  Ao V2 mean: 106.1 cm/sec  Ao mean P.8 mmHg  Ao V2 VTI: 27.6  cm  CHRISTINA(I,D): 4.6 cm2  CHRISTINA(V,D): 4.6 cm2  LV V1 max P.5 mmHg  LV V1 max: 117.5 cm/sec  LV V1 VTI: 23.6 cm  SV(LVOT): 127.8 ml  SI(LVOT): 54.4 ml/m2  PA acc time: 0.11 sec  TR max naseem: 209.7 cm/sec  TR max P.6 mmHg  AV Naseem Ratio (DI): 0.86  CHRISTINA Index (cm2/m2): 2.0  E/E' av.8  Lateral E/e': 13.9  Medial E/e': 9.7  RV S Naseem: 14.9 cm/sec     ______________________________________________________________________________  Report approved by: Rin Amor 10/19/2024 04:10 PM           *Note: Due to a large number of results and/or encounters for the requested time period, some results have not been displayed. A complete set of results can be found in Results Review.

## 2024-10-21 ENCOUNTER — VIRTUAL VISIT (OUTPATIENT)
Dept: UROLOGY | Facility: CLINIC | Age: 84
End: 2024-10-21
Payer: COMMERCIAL

## 2024-10-21 ENCOUNTER — PATIENT OUTREACH (OUTPATIENT)
Dept: CARE COORDINATION | Facility: CLINIC | Age: 84
End: 2024-10-21

## 2024-10-21 DIAGNOSIS — N39.0 RECURRENT UTI: ICD-10-CM

## 2024-10-21 DIAGNOSIS — R39.9 LOWER URINARY TRACT SYMPTOMS (LUTS): ICD-10-CM

## 2024-10-21 PROCEDURE — 99203 OFFICE O/P NEW LOW 30 MIN: CPT | Mod: 95 | Performed by: NURSE PRACTITIONER

## 2024-10-21 NOTE — NURSING NOTE
Current patient location: [address] [place] [address] [address]  [facility] [id number]    Is the patient currently in the state of [place]? YES    Visit mode:VIDEO    If the visit is dropped, the patient can be reconnected by: VIDEO VISIT: Text to cell phone:   Telephone Information:   Mobile [phone]       Will anyone else be joining the visit? NO  (If patient encounters technical issues they should call [phone]    Are changes needed to the allergy or medication list? Pt stated no med changes    Are refills needed on medications prescribed by this physician? NO    Rooming Documentation:  Not applicable    Reason for visit: Consult (LUTS/ recurrent UTI's )    Angelina Theodore VVF    Pt states he is just post hospital stay- pt was dizzy- Doctors concluded that he perhaps was overmedicated with high blood pressured medication.

## 2024-10-21 NOTE — LETTER
10/21/2024       RE: Erich Craven  [address]  [facility] [address]     Dear Colleague,    Thank you for referring your patient, Erich Craven, to the [facility] [place] at [facility]. Please see a copy of my visit note below.    Virtual Visit Details  Type of service:  Video Visit   Originating Location (pt. Location): Home  Distant Location (provider location):  On-site  Platform used for Video Visit: [facility]      Urology Virtual Visit    Name: Erich Craven    MRN: [id number]   YOB: 1940               Chief Complaint:   Urinary Symptom or Sign         Impression and Plan:   Impression / Plan:   Erich Craven is a 84 year old male with BPH with LUTS, history of epididymitis and UTI.      - Continue Flomax  - Will have patient come in for nurse visit to perform PVR.  If PVR is greater than 200 he should be scheduled for cystoscopy with Dr. Holland.    Thank you for the opportunity to participate in the care of Erich Craven.     VAMSI Medina, CNP   Physicians - Department of Urology  [phone]          History of Present Illness:   Erich Craven is a 84 year old male with history of T2DM, CAD, DVT/PE, a-fib, pacemaker, xarelto use, COPD, RIN, CHF on Lasix, CKD, BPH w LUTS on Flomax here today to follow-up on UTI. He presented to the ED originally on 7/29 for groin pain and swelling and was found to have epididymitis and UTI and was placed on Levofloxacin, he reports his symptoms improved with this. He represented ton 9/1 with CC of dysuria, urge/freq.  He was found to have Staphylococcus epidermidis UTI.  Surgery prescribed Bactrim, he developed some itching with this and was switched to nitrofurantoin.  His symptoms resolved with this and he has remained asymptomatic.  He drinks plenty of water.  Does not drink caffeine.  No history of kidney stones or kidney infection.  No blood in the urine.  No  fever, abdominal pain, flank pain, emesis, nausea.  He had a urinalysis collected on 9-17 which was bland.    History is obtained from patient & EMR    Pertinent imaging reviewed:  US TESTICULAR AND SCROTUM WITH DOPPLER LIMITED  LOCATION: [facility]  DATE: 7/29/2024  1.  Focal hyperemic edematous area inferior to the right testicle may be due to edema and inflammation in the right epididymal tail. Findings are suggestive of acute right epididymitis. Consider a follow-up ultrasound in 1-2 months.  2.  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 Started in 2012     Hypothyroidism      Lymphedema      Mumps      Obesity, Class III, BMI 40-49.9 (morbid obesity) (H)      RIN (obstructive sleep apnea)     has refused CPAP     Pacemaker      Paroxysmal A-fib (H)      PE (pulmonary thromboembolism) (H)      Status post coronary angiogram 11/05/2019          Past Surgical History:     Past Surgical History:   Procedure Laterality Date     cardiac stenting       COLONOSCOPY  2010     CV RIGHT HEART CATH MEASUREMENTS RECORDED N/A 11/05/2019    Procedure: Right Heart Cath;  Surgeon: Roberto Hernandez MD;  Location:  HEART CARDIAC CATH LAB     ENT SURGERY      tonsillectomy     EP PACEMAKER GENERATOR REPLACEMENT- DUAL N/A  2024    Procedure: Pacemaker Generator Replacement Dual;  Surgeon: Denis Perez MD;  Location:  HEART CARDIAC CATH LAB     EYE SURGERY  2012     IMPLANT PACEMAKER       INJECT NERVE BLOCK SUPRASCAPULAR Bilateral 2021    Procedure: BLOCK, NERVE, SUPRASCAPULAR, Bilateral, under ultrasound guidance;  Surgeon: Swetha Allen MD;  Location: [facility] OR     INJECT NERVE BLOCK SUPRASCAPULAR Bilateral 2021    Procedure: bilateral suprascapular nerve block;  Surgeon: Swetha Allen MD;  Location: [facility] OR     INJECT NERVE BLOCK SUPRASCAPULAR Left 09/15/2022    Procedure: BLOCK, NERVE, SUPRASCAPULAR- left;  Surgeon: Swetha Allen MD;  Location: [facility] OR     INJECT NERVE BLOCK SUPRASCAPULAR Left 10/20/2022    Procedure: BLOCK, NERVE, SUPRASCAPULAR- left;  Surgeon: Swetha Allen MD;  Location: [facility] OR     INJECT NERVE BLOCK SUPRASCAPULAR Left 2023    Procedure: BLOCK, NERVE, SUPRASCAPULAR (left);  Surgeon: Swetha Allen MD;  Location: [facility] OR     NERVE BLOCK PERIPHERAL Bilateral 03/10/2022    Procedure: bilateral suprascapular nerve block;  Surgeon: Swetha Allen MD;  Location: [facility] OR     ORTHOPEDIC SURGERY Right     knee replacement     REVERSE ARTHROPLASTY SHOULDER Left 2023    Procedure: ARTHROPLASTY, SHOULDER, TOTAL, REVERSE LEFT;  Surgeon: Katelyn Vasquez MD;  [facility]     VASCULAR SURGERY            Social History:     Social History     Tobacco Use     Smoking status: Former     Current packs/day: 0.00     Average packs/day: 0.5 packs/day for 30.0 years (15.0 ttl pk-yrs)     Types: Cigarettes     Start date: 6/15/1959     Quit date: 1989     Years since quittin.3     Passive exposure: Past     Smokeless tobacco: Never     Tobacco comments:     smoked from  to    Substance Use Topics     Alcohol use: No     Comment: quit in ; recovering          Family History:     Family History   Problem Relation Age of Onset     Brain Cancer Mother       Other Cancer Mother      Anxiety Disorder Mother      Cerebrovascular Disease Father      Hypertension Father      Obesity Father      Substance Abuse Son      Osteoporosis Sister      Thyroid Disease Sister      Anesthesia Reaction No family hx of      Thrombosis No family hx of           Allergies:   No Known Allergies       Medications:     Current Outpatient Medications   Medication Sig Dispense Refill     acetaminophen (TYLENOL) 500 MG tablet Take 1,000 mg by mouth 3 times daily       albuterol (PROAIR HFA/PROVENTIL HFA/VENTOLIN HFA) 108 (90 Base) MCG/ACT inhaler Inhale 2 puffs into the lungs every 6 hours as needed for shortness of breath, wheezing or cough.       aspirin (ASA) 81 MG chewable tablet Take 1 tablet by mouth daily       atorvastatin (LIPITOR) 80 MG tablet Take 1 tablet (80 mg) by mouth every evening 90 tablet 4     diltiazem ER COATED BEADS (CARDIZEM CD/CARTIA XT) 300 MG 24 hr capsule Take 1 capsule (300 mg) by mouth daily 90 capsule 3     ferrous sulfate (SLOW FE) 142 (45 Fe) MG CR tablet TAKE ONE TABLET BY MOUTH EVERY OTHER DAY 45 tablet 1     Fluticasone-Umeclidin-Vilanterol (TRELEGY ELLIPTA) 200-62.5-25 MCG/INH oral inhaler Inhale 1 puff into the lungs daily       furosemide (LASIX) 40 MG tablet Take 0.5 tablets (20 mg) by mouth 2 times daily. 90 tablet 2     levothyroxine (SYNTHROID/LEVOTHROID) 175 MCG tablet Take 1 tablet (175 mcg) by mouth six times a week. 350 mcg on Fridays       Multiple vitamin TABS Take 1 tablet by mouth daily       polyethylene glycol (MIRALAX) 17 g packet Take 17 g by mouth daily 7 packet 0     potassium chloride cammy ER (KLOR-CON M10) 10 MEQ CR tablet Take 1 tablet (10 mEq) by mouth 2 times daily 180 tablet 1     tamsulosin (FLOMAX) 0.4 MG capsule Take 1 capsule (0.4 mg) by mouth daily 90 capsule 0     venlafaxine (EFFEXOR) 75 MG tablet TAKE 1 TABLET(75 MG) BY MOUTH TWICE DAILY 180 tablet 0     XARELTO ANTICOAGULANT 20 MG TABS tablet TAKE 1 TABLET(20 MG) BY MOUTH  "DAILY WITH DINNER (Patient taking differently: Take 20 mg by mouth daily (with breakfast).) 30 tablet 11     No current facility-administered medications for this visit.          Review of Systems:    ROS: See HPI for pertinent details.  Remainder of 10-point ROS negative.         Physical Exam:   VS:  T: Data Unavailable    HR: Data Unavailable    BP: Data Unavailable    RR: Data Unavailable     GEN:  Alert.  NAD.    HEENT:  Sclerae anicteric.    CV:  No obvious jugular venous distension  LUNGS: No respiratory distress, breathing comfortably wo accessory muscle use.  SKIN:  No visible rash  NEURO:  CN grossly intact.          Laboratory Data:   No results found for: \"PSA\"  Lab Results   Component Value Date    CR 1.16 10/19/2024    CR 1.39 10/18/2024    CR 1.09 09/23/2024    CR 1.11 08/06/2024    CR 1.02 07/19/2024    CR 1.57 06/21/2021    CR 1.81 06/14/2021    CR 1.02 04/21/2021    CR 1.42 03/02/2021    CR 1.34 02/19/2021     Lab Results   Component Value Date    GFRESTIMATED 62 10/19/2024    GFRESTIMATED 50 10/18/2024    GFRESTIMATED 67 09/23/2024    GFRESTIMATED 65 08/06/2024    GFRESTIMATED 72 07/19/2024    GFRESTIMATED 40 06/21/2021    GFRESTIMATED 34 06/14/2021    GFRESTIMATED 68 04/21/2021    GFRESTIMATED 46 03/02/2021    GFRESTIMATED 49 02/19/2021              Again, thank you for allowing me to participate in the care of your patient.      Sincerely,    Levy Cheng, VAMSI CNP    "

## 2024-10-21 NOTE — PROGRESS NOTES
Virtual Visit Details  Type of service:  Video Visit   Originating Location (pt. Location): Home  Distant Location (provider location):  On-site  Platform used for Video Visit: [facility]      Urology Virtual Visit    Name: Erich Craven    MRN: [id number]   YOB: 1940               Chief Complaint:   Urinary Symptom or Sign         Impression and Plan:   Impression / Plan:   Erich Craven is a 84 year old male with BPH with LUTS, history of epididymitis and UTI.      - Continue Flomax  - Will have patient come in for nurse visit to perform PVR.  If PVR is greater than 200 he should be scheduled for cystoscopy with Dr. Holland.    Thank you for the opportunity to participate in the care of Erich Craven.     VAMSI Medina, CNP   Physicians - Department of Urology  [phone]          History of Present Illness:   Erich Craven is a 84 year old male with history of T2DM, CAD, DVT/PE, a-fib, pacemaker, xarelto use, COPD, RIN, CHF on Lasix, CKD, BPH w LUTS on Flomax here today to follow-up on UTI. He presented to the ED originally on 7/29 for groin pain and swelling and was found to have epididymitis and UTI and was placed on Levofloxacin, he reports his symptoms improved with this. He represented ton 9/1 with CC of dysuria, urge/freq.  He was found to have Staphylococcus epidermidis UTI.  Surgery prescribed Bactrim, he developed some itching with this and was switched to nitrofurantoin.  His symptoms resolved with this and he has remained asymptomatic.  He drinks plenty of water.  Does not drink caffeine.  No history of kidney stones or kidney infection.  No blood in the urine.  No fever, abdominal pain, flank pain, emesis, nausea.  He had a urinalysis collected on 9-17 which was bland.    History is obtained from patient & EMR    Pertinent imaging reviewed:  US TESTICULAR AND SCROTUM WITH DOPPLER LIMITED  LOCATION: [facility]  DATE: 7/29/2024  1.  Focal hyperemic  edematous area inferior to the right testicle may be due to edema and inflammation in the right epididymal tail. Findings are suggestive of acute right epididymitis. Consider a follow-up ultrasound in 1-2 months.  2.  Small bilateral hydroceles          Past Medical History:     Past Medical History:   Diagnosis Date    (HFpEF) heart failure with preserved ejection fraction (H)     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 Started in 2012    Hypothyroidism     Lymphedema     Mumps     Obesity, Class III, BMI 40-49.9 (morbid obesity) (H)     RIN (obstructive sleep apnea)     has refused CPAP    Pacemaker     Paroxysmal A-fib (H)     PE (pulmonary thromboembolism) (H)     Status post coronary angiogram 11/05/2019          Past Surgical History:     Past Surgical History:   Procedure Laterality Date    cardiac stenting      COLONOSCOPY  2010    CV RIGHT HEART CATH MEASUREMENTS RECORDED N/A 11/05/2019    Procedure: Right Heart Cath;  Surgeon: Roberto Hernandez MD;  [facility]    ENT SURGERY      tonsillectomy    EP PACEMAKER GENERATOR REPLACEMENT- DUAL N/A 9/23/2024    Procedure: Pacemaker Generator Replacement Dual;  Surgeon: Denis Perez MD;  [facility]    EYE SURGERY  2012    IMPLANT PACEMAKER      INJECT NERVE BLOCK SUPRASCAPULAR Bilateral 08/26/2021    Procedure: BLOCK, NERVE, SUPRASCAPULAR, Bilateral, under ultrasound guidance;  Surgeon: Swetha Allen MD;  Location:  [facility] OR    INJECT NERVE BLOCK SUPRASCAPULAR Bilateral 2021    Procedure: bilateral suprascapular nerve block;  Surgeon: Swetha Allen MD;  Location: [facility] OR    INJECT NERVE BLOCK SUPRASCAPULAR Left 09/15/2022    Procedure: BLOCK, NERVE, SUPRASCAPULAR- left;  Surgeon: Swetha Allen MD;  Location: [facility] OR    INJECT NERVE BLOCK SUPRASCAPULAR Left 10/20/2022    Procedure: BLOCK, NERVE, SUPRASCAPULAR- left;  Surgeon: Swetha Allen MD;  Location: [facility] OR    INJECT NERVE BLOCK SUPRASCAPULAR Left 2023    Procedure: BLOCK, NERVE, SUPRASCAPULAR (left);  Surgeon: Swetha Allen MD;  Location: [facility] OR    NERVE BLOCK PERIPHERAL Bilateral 03/10/2022    Procedure: bilateral suprascapular nerve block;  Surgeon: Swetha Allen MD;  Location: [facility] OR    ORTHOPEDIC SURGERY Right 2012    knee replacement    REVERSE ARTHROPLASTY SHOULDER Left 2023    Procedure: ARTHROPLASTY, SHOULDER, TOTAL, REVERSE LEFT;  Surgeon: Katelyn Vasquez MD;  Location:  OR    VASCULAR SURGERY            Social History:     Social History     Tobacco Use    Smoking status: Former     Current packs/day: 0.00     Average packs/day: 0.5 packs/day for 30.0 years (15.0 ttl pk-yrs)     Types: Cigarettes     Start date: 6/15/1959     Quit date: 1989     Years since quittin.3     Passive exposure: Past    Smokeless tobacco: Never    Tobacco comments:     smoked from  to    Substance Use Topics    Alcohol use: No     Comment: quit in ; recovering          Family History:     Family History   Problem Relation Age of Onset    Brain Cancer Mother     Other Cancer Mother     Anxiety Disorder Mother     Cerebrovascular Disease Father     Hypertension Father     Obesity Father     Substance Abuse Son     Osteoporosis Sister     Thyroid Disease Sister     Anesthesia Reaction No family hx of     Thrombosis No family hx of           Allergies:   No Known Allergies       Medications:     Current Outpatient Medications    Medication Sig Dispense Refill    acetaminophen (TYLENOL) 500 MG tablet Take 1,000 mg by mouth 3 times daily      albuterol (PROAIR HFA/PROVENTIL HFA/VENTOLIN HFA) 108 (90 Base) MCG/ACT inhaler Inhale 2 puffs into the lungs every 6 hours as needed for shortness of breath, wheezing or cough.      aspirin (ASA) 81 MG chewable tablet Take 1 tablet by mouth daily      atorvastatin (LIPITOR) 80 MG tablet Take 1 tablet (80 mg) by mouth every evening 90 tablet 4    diltiazem ER COATED BEADS (CARDIZEM CD/CARTIA XT) 300 MG 24 hr capsule Take 1 capsule (300 mg) by mouth daily 90 capsule 3    ferrous sulfate (SLOW FE) 142 (45 Fe) MG CR tablet TAKE ONE TABLET BY MOUTH EVERY OTHER DAY 45 tablet 1    Fluticasone-Umeclidin-Vilanterol (TRELEGY ELLIPTA) 200-62.5-25 MCG/INH oral inhaler Inhale 1 puff into the lungs daily      furosemide (LASIX) 40 MG tablet Take 0.5 tablets (20 mg) by mouth 2 times daily. 90 tablet 2    levothyroxine (SYNTHROID/LEVOTHROID) 175 MCG tablet Take 1 tablet (175 mcg) by mouth six times a week. 350 mcg on Fridays      Multiple vitamin TABS Take 1 tablet by mouth daily      polyethylene glycol (MIRALAX) 17 g packet Take 17 g by mouth daily 7 packet 0    potassium chloride cammy ER (KLOR-CON M10) 10 MEQ CR tablet Take 1 tablet (10 mEq) by mouth 2 times daily 180 tablet 1    tamsulosin (FLOMAX) 0.4 MG capsule Take 1 capsule (0.4 mg) by mouth daily 90 capsule 0    venlafaxine (EFFEXOR) 75 MG tablet TAKE 1 TABLET(75 MG) BY MOUTH TWICE DAILY 180 tablet 0    XARELTO ANTICOAGULANT 20 MG TABS tablet TAKE 1 TABLET(20 MG) BY MOUTH DAILY WITH DINNER (Patient taking differently: Take 20 mg by mouth daily (with breakfast).) 30 tablet 11     No current facility-administered medications for this visit.          Review of Systems:    ROS: See HPI for pertinent details.  Remainder of 10-point ROS negative.         Physical Exam:   VS:  T: Data Unavailable    HR: Data Unavailable    BP: Data Unavailable    RR: Data  "Unavailable     GEN:  Alert.  NAD.    HEENT:  Sclerae anicteric.    CV:  No obvious jugular venous distension  LUNGS: No respiratory distress, breathing comfortably wo accessory muscle use.  SKIN:  No visible rash  NEURO:  CN grossly intact.          Laboratory Data:   No results found for: \"PSA\"  Lab Results   Component Value Date    CR 1.16 10/19/2024    CR 1.39 10/18/2024    CR 1.09 09/23/2024    CR 1.11 08/06/2024    CR 1.02 07/19/2024    CR 1.57 06/21/2021    CR 1.81 06/14/2021    CR 1.02 04/21/2021    CR 1.42 03/02/2021    CR 1.34 02/19/2021     Lab Results   Component Value Date    GFRESTIMATED 62 10/19/2024    GFRESTIMATED 50 10/18/2024    GFRESTIMATED 67 09/23/2024    GFRESTIMATED 65 08/06/2024    GFRESTIMATED 72 07/19/2024    GFRESTIMATED 40 06/21/2021    GFRESTIMATED 34 06/14/2021    GFRESTIMATED 68 04/21/2021    GFRESTIMATED 46 03/02/2021    GFRESTIMATED 49 02/19/2021          "

## 2024-10-21 NOTE — PROGRESS NOTES
Clinic Care Coordination Contact  [facility]    Clinical Data: Care Coordinator Outreach    Outreach Documentation Number of Outreach Attempt   10/21/2024  10:03 AM 1       Unable to leave voicemail.  Phone rang and then disconnected x 2.      Plan: Care Coordinator will send care coordination introduction letter with care coordinator contact information and explanation of care coordination services via [employer]. Care Coordinator will try to reach patient again in 1-2 business days.    Payal Werner  [facility]  Clinic Care Coordinator  [facility]  [phone]  [email or web address]

## 2024-10-21 NOTE — LETTER
[facility] CARE COORDINATION  [address]  [facility] [address]    October 21, 2024    Erich Craven  [address]  [facility] [address]      Dear Jaun,    I am a clinic care coordinator who works with Anne Heath MD with the [facility]. I wanted to introduce myself and provide you with my contact information for you to be able to call me with any questions or concerns. Below is a description of clinic care coordination and how I can further assist you.       The clinic care coordination team is made up of a registered nurse, , financial resource worker and community health worker who understand the health care system. The goal of clinic care coordination is to help you manage your health and improve access to the health care system. Our team works alongside your provider to assist you in determining your health and social needs. We can help you obtain health care and community resources, providing you with necessary information and education. We can work with you through any barriers and develop a care plan that helps coordinate and strengthen the communication between you and your care team.  Our services are voluntary and are offered without charge to you personally.    Please feel free to contact me with any questions or concerns regarding care coordination and what we can offer.      We are focused on providing you with the highest-quality healthcare experience possible.    Sincerely,     Payal Werner, [facility]  Clinic Care Coordinator  [facility]  [phone]

## 2024-10-23 ENCOUNTER — OFFICE VISIT (OUTPATIENT)
Dept: OTHER | Facility: CLINIC | Age: 84
End: 2024-10-23
Attending: INTERNAL MEDICINE
Payer: MEDICARE

## 2024-10-23 VITALS
HEART RATE: 67 BPM | BODY MASS INDEX: 35.53 KG/M2 | DIASTOLIC BLOOD PRESSURE: 78 MMHG | SYSTOLIC BLOOD PRESSURE: 135 MMHG | OXYGEN SATURATION: 93 % | WEIGHT: 262 LBS

## 2024-10-23 DIAGNOSIS — I83.893 VARICOSE VEINS OF BILATERAL LOWER EXTREMITIES WITH OTHER COMPLICATIONS: ICD-10-CM

## 2024-10-23 DIAGNOSIS — I89.0 LYMPHEDEMA: Primary | ICD-10-CM

## 2024-10-23 DIAGNOSIS — I48.0 PAROXYSMAL A-FIB (H): ICD-10-CM

## 2024-10-23 DIAGNOSIS — I10 BENIGN ESSENTIAL HYPERTENSION: ICD-10-CM

## 2024-10-23 DIAGNOSIS — I50.32 CHRONIC DIASTOLIC HEART FAILURE (H): ICD-10-CM

## 2024-10-23 DIAGNOSIS — G47.33 OSA (OBSTRUCTIVE SLEEP APNEA): ICD-10-CM

## 2024-10-23 DIAGNOSIS — I77.810 ASCENDING AORTA DILATATION (H): ICD-10-CM

## 2024-10-23 DIAGNOSIS — I26.99 PE (PULMONARY THROMBOEMBOLISM) (H): ICD-10-CM

## 2024-10-23 DIAGNOSIS — I82.4Z9 DEEP VEIN THROMBOSIS (DVT) OF DISTAL VEIN OF LOWER EXTREMITY, UNSPECIFIED CHRONICITY, UNSPECIFIED LATERALITY (H): ICD-10-CM

## 2024-10-23 PROCEDURE — G0463 HOSPITAL OUTPT CLINIC VISIT: HCPCS | Performed by: INTERNAL MEDICINE

## 2024-10-23 PROCEDURE — G2211 COMPLEX E/M VISIT ADD ON: HCPCS | Performed by: INTERNAL MEDICINE

## 2024-10-23 PROCEDURE — 99215 OFFICE O/P EST HI 40 MIN: CPT | Performed by: INTERNAL MEDICINE

## 2024-10-23 NOTE — PROGRESS NOTES
[facility]        Patient is here for a follow up.    Pt is currently taking Aspirin, Statin, and Xarelto.    /78 (BP Location: Right arm, Patient Position: Sitting, Cuff Size: Adult Regular)   Pulse 67   Wt 262 lb (118.8 kg)   SpO2 93%   BMI 35.53 kg/m      The provider has been notified that the patient has no concerns.     Questions patient would like addressed today are: N/A.    Refills are needed: No    Has homecare services and agency name:  Varsha Lopez MA

## 2024-10-23 NOTE — PROGRESS NOTES
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 Palencia outside the system now dealing with bilateral lower extremity lymphedema.   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 Started in 2012    Hypothyroidism     Lymphedema     Mumps     Obesity, Class III, BMI 40-49.9 (morbid obesity) (H)     RIN (obstructive sleep apnea)     has refused CPAP    Pacemaker     Paroxysmal A-fib (H)     PE (pulmonary thromboembolism) (H)     Status post coronary angiogram 11/05/2019       PAST SURGICAL HISTORY:  Past Surgical History:   Procedure Laterality Date    cardiac stenting      COLONOSCOPY  2010    CV RIGHT HEART CATH MEASUREMENTS RECORDED N/A 11/05/2019    Procedure: Right Heart Cath;  Surgeon: Roberto Hernandez MD;  Location:  HEART CARDIAC CATH LAB    ENT SURGERY      tonsillectomy    EP PACEMAKER GENERATOR REPLACEMENT- DUAL N/A 9/23/2024    Procedure: Pacemaker Generator Replacement Dual;  Surgeon: Denis Perez MD;  Location:  HEART CARDIAC CATH LAB    EYE SURGERY  2012    IMPLANT PACEMAKER      INJECT NERVE BLOCK SUPRASCAPULAR Bilateral 08/26/2021    Procedure: BLOCK, NERVE, SUPRASCAPULAR, Bilateral, under ultrasound guidance;  Surgeon: Swetha Allen MD;  Location: [facility] OR    INJECT NERVE BLOCK SUPRASCAPULAR Bilateral 09/23/2021    Procedure: bilateral suprascapular nerve block;  Surgeon: Swetha Allen MD;  Location: [facility] OR    INJECT NERVE BLOCK SUPRASCAPULAR Left 09/15/2022    Procedure: BLOCK, NERVE, SUPRASCAPULAR- left;  Surgeon: Swetha Allen MD;  Location: [facility] OR    INJECT NERVE BLOCK SUPRASCAPULAR Left 10/20/2022    Procedure: BLOCK, NERVE, SUPRASCAPULAR- left;  Surgeon: Swetha Allen MD;  Location: [facility] OR    INJECT NERVE BLOCK SUPRASCAPULAR Left 01/05/2023    Procedure: BLOCK, NERVE, SUPRASCAPULAR (left);  Surgeon: Swetha Allen MD;  Location: [facility] OR    NERVE BLOCK PERIPHERAL Bilateral 03/10/2022    Procedure: bilateral suprascapular nerve block;  Surgeon: Swetha Allen MD;  Location: [facility] OR    ORTHOPEDIC SURGERY Right 2012    knee replacement    REVERSE ARTHROPLASTY SHOULDER Left  02/21/2023    Procedure: ARTHROPLASTY, SHOULDER, TOTAL, REVERSE LEFT;  Surgeon: Katelyn Vasquez MD;  Location: [facility] OR    VASCULAR SURGERY  2022         CURRENT MEDICATIONS  Current Outpatient Medications   Medication Sig Dispense Refill    acetaminophen (TYLENOL) 500 MG tablet Take 1,000 mg by mouth 3 times daily      albuterol (PROAIR HFA/PROVENTIL HFA/VENTOLIN HFA) 108 (90 Base) MCG/ACT inhaler Inhale 2 puffs into the lungs every 6 hours as needed for shortness of breath, wheezing or cough.      aspirin (ASA) 81 MG chewable tablet Take 1 tablet by mouth daily      atorvastatin (LIPITOR) 80 MG tablet Take 1 tablet (80 mg) by mouth every evening 90 tablet 4    diltiazem ER COATED BEADS (CARDIZEM CD/CARTIA XT) 300 MG 24 hr capsule Take 1 capsule (300 mg) by mouth daily 90 capsule 3    ferrous sulfate (SLOW FE) 142 (45 Fe) MG CR tablet TAKE ONE TABLET BY MOUTH EVERY OTHER DAY 45 tablet 1    Fluticasone-Umeclidin-Vilanterol (TRELEGY ELLIPTA) 200-62.5-25 MCG/INH oral inhaler Inhale 1 puff into the lungs daily      furosemide (LASIX) 40 MG tablet Take 0.5 tablets (20 mg) by mouth 2 times daily. 90 tablet 2    levothyroxine (SYNTHROID/LEVOTHROID) 175 MCG tablet Take 1 tablet (175 mcg) by mouth six times a week. 350 mcg on Fridays      Multiple vitamin TABS Take 1 tablet by mouth daily      polyethylene glycol (MIRALAX) 17 g packet Take 17 g by mouth daily 7 packet 0    potassium chloride cammy ER (KLOR-CON M10) 10 MEQ CR tablet Take 1 tablet (10 mEq) by mouth 2 times daily 180 tablet 1    tamsulosin (FLOMAX) 0.4 MG capsule Take 1 capsule (0.4 mg) by mouth daily 90 capsule 0    venlafaxine (EFFEXOR) 75 MG tablet TAKE 1 TABLET(75 MG) BY MOUTH TWICE DAILY 180 tablet 0    XARELTO ANTICOAGULANT 20 MG TABS tablet TAKE 1 TABLET(20 MG) BY MOUTH DAILY WITH DINNER 30 tablet 11     No current facility-administered medications for this visit.       ALLERGIES     No Known Allergies      FAMILY HISTORY  Family History   Problem  Relation Age of Onset    Brain Cancer Mother     Other Cancer Mother     Anxiety Disorder Mother     Cerebrovascular Disease Father     Hypertension Father     Obesity Father     Substance Abuse Son     Osteoporosis Sister     Thyroid Disease Sister     Anesthesia Reaction No family hx of     Thrombosis No family hx of        SOCIAL HISTORY  Social History     Socioeconomic History    Marital status:      Spouse name: Not on file    Number of children: 5    Years of education: Not on file    Highest education level: Not on file   Occupational History    Occupation: retired teacher at [employer]   Tobacco Use    Smoking status: Former     Packs/day: 0.50     Years: 25.00     Pack years: 12.50     Types: Cigarettes     Start date:      Quit date:      Years since quittin.5    Smokeless tobacco: Never   Vaping Use    Vaping Use: Never used   Substance and Sexual Activity    Alcohol use: No     Comment: quit in ; recovering    Drug use: No       ROS:   Complete ROS negative other than what is stated in HPI.     EXAM:  /78 (BP Location: Right arm, Patient Position: Sitting, Cuff Size: Adult Regular)   Pulse 67   Wt 262 lb (118.8 kg)   SpO2 93%   BMI 35.53 kg/m    In general, the patient is a pleasant male in no apparent distress.    HEENT: NC/AT.  PERRLA.  EOMI.  Sclerae white, not injected.    Heart: RRR. Normal S1, S2 splits physiologically. No murmur, rub, click, or gallop.   Lungs: CTA.  No ronchi, wheezes, rales.    Abdomen: Soft, nontender, nondistended.   Extremities: No clubbing, cyanosis. + Bilateral 2+ bilateral lower extremity edema/lymphedema. No wounds. Ulcer on left lower leg is healed. Numerous spider veins throughout both legs.     Labs:  LIPID RESULTS:  Lab Results   Component Value Date    CHOL 128 2024    CHOL 138 2021    HDL 57 2024    HDL 44 2021    LDL 50 2024    LDL 52 2021    TRIG 104 2024    TRIG 209 (H) 2021        LIVER ENZYME RESULTS:  Lab Results   Component Value Date    AST 31 10/18/2024    AST 14 02/19/2021    ALT 26 10/18/2024    ALT 26 02/19/2021       CBC RESULTS:  Lab Results   Component Value Date    WBC 8.5 10/19/2024    WBC 10.2 06/14/2021    RBC 3.76 (L) 10/19/2024    RBC 3.68 (L) 06/14/2021    HGB 12.2 (L) 10/19/2024    HGB 12.2 (L) 06/14/2021    HCT 37.0 (L) 10/19/2024    HCT 36.7 (L) 06/14/2021    MCV 98 10/19/2024     06/14/2021    MCH 32.4 10/19/2024    MCH 33.2 (H) 06/14/2021    MCHC 33.0 10/19/2024    MCHC 33.2 06/14/2021    RDW 14.3 10/19/2024    RDW 15.6 (H) 06/14/2021     10/19/2024     06/14/2021       BMP RESULTS:  Lab Results   Component Value Date     10/19/2024     06/21/2021    POTASSIUM 3.7 10/19/2024    POTASSIUM 3.5 10/18/2022    POTASSIUM 4.1 06/21/2021    CHLORIDE 101 10/19/2024    CHLORIDE 99 10/13/2022    CHLORIDE 103 06/21/2021    CO2 27 10/19/2024    CO2 31 10/13/2022    CO2 29 06/21/2021    ANIONGAP 8 10/19/2024    ANIONGAP 8 10/13/2022    ANIONGAP 6 06/21/2021    GLC 98 10/19/2024    GLC 94 10/19/2024     (H) 10/13/2022     (H) 06/21/2021    BUN 20.6 10/19/2024    BUN 43 (H) 10/13/2022    BUN 64 (H) 06/21/2021    CR 1.16 10/19/2024    CR 1.57 (H) 06/21/2021    GFRESTIMATED 62 10/19/2024    GFRESTIMATED 40 (L) 06/21/2021    GFRESTBLACK 47 (L) 06/21/2021    LAWSON 9.3 10/19/2024    LAWSON 9.4 06/21/2021        A1C RESULTS:  Lab Results   Component Value Date    A1C 6.0 (H) 01/12/2024    A1C 6.1 (H) 06/21/2021       THYROID RESULTS:  Lab Results   Component Value Date    TSH 2.41 07/19/2024    TSH 3.07 10/13/2022    TSH 0.71 06/14/2021         Procedures:   None recently.     Assessment and Plan:     1. Lymphedema (Phlebo-Lymphedema)      2. Varicose veins of bilateral lower extremities with other complications s/p multiple ablations on left leg and one ablation on right leg by Dr. Palencia (outside system)      3. Deep vein thrombosis (DVT)  RLE and PE after knee replacement 2012      4. Open wound of left lower extremity, resolved    5. Long term current use of anticoagulants (Xarelto)     6. Chronic diastolic heart failure     7. RIN (obstructive sleep apnea)     8. Paroxysmal A-fib      9. Benign essential hypertension, labile and low recently adjusted meds at hospital      -Lymphedema improved  with 2 layer wraps by home care RN and left leg ulceration healed.   -Maintained in knee-high compression socks with ongoing 2+ edema/lymphedema  -He underwent multiple ablations in the legs before -> unclear anymore ablations will be helpful in this patient.    Recommendations:   Arrange referral to see again lymphedema therapist   continue compression, leg wraps, elevation and weight loss  -Elevate legs as able.   -Continue Lymphatic Formula 1000 supplement 1 pill a day.   -Continue to exercise /walk his puppy.   -Continue Xarelto for history of DVT/PE and atrial fibrillation, medications refilled and coupon given  -Skin care discussed with the patient  Follow-up with primary and primary cardiologist as planned  Follow-up in 1 year or sooner if any problems    40 minutes spent on the date of the encounter doing chart review, review of recent hospitalization records, previous evaluation, history, exam, documentation and addressed above-mentioned issues medications refilled arrange referral to see edema therapist.  AVS with written instructions given    The longitudinal care of plan for the above diagnoses was addressed during this visit. Due to added complexity of care, we will continue to supprt Erich Craven and the subsequent management of this/these conditions and with ongoing continuity of care for this/these conditions.       Monty Pardo MD , FAJAMI, FSVM, FNLA, FACP  Vascular Medicine

## 2024-10-23 NOTE — PROGRESS NOTES
Clinic Care Coordination Contact  [facility]/Voicemail    Clinical Data: Care Coordinator Outreach    Outreach Documentation Number of Outreach Attempt   10/21/2024  10:03 AM 1   10/23/2024   8:37 AM 2       Left message on patient's voicemail with call back information and requested return call.      Plan: Care Coordinator sent care coordination introduction letter on 10/21/24 via [employer]. Care Coordinator will do no further outreaches at this time.    Payal Werner,  [facility]  Clinic Care Coordinator  [facility]  [phone]  [email or web address]

## 2024-10-23 NOTE — PATIENT INSTRUCTIONS
Continue recently adjusted medications, monitor BP at home     Use compression stockings    Continue lymphatic formula 1000     See edema therapist  , referral done     Refilled xarelto     Follow up in one year

## 2024-10-24 ENCOUNTER — VIRTUAL VISIT (OUTPATIENT)
Dept: PHARMACY | Facility: CLINIC | Age: 84
End: 2024-10-24
Payer: COMMERCIAL

## 2024-10-24 DIAGNOSIS — I48.0 PAROXYSMAL A-FIB (H): ICD-10-CM

## 2024-10-24 DIAGNOSIS — I82.4Z9 DEEP VEIN THROMBOSIS (DVT) OF DISTAL VEIN OF LOWER EXTREMITY, UNSPECIFIED CHRONICITY, UNSPECIFIED LATERALITY (H): ICD-10-CM

## 2024-10-24 DIAGNOSIS — N40.0 BENIGN PROSTATIC HYPERPLASIA WITHOUT LOWER URINARY TRACT SYMPTOMS: ICD-10-CM

## 2024-10-24 DIAGNOSIS — I10 ESSENTIAL HYPERTENSION: ICD-10-CM

## 2024-10-24 DIAGNOSIS — F41.9 ANXIETY: ICD-10-CM

## 2024-10-24 DIAGNOSIS — I26.99 PE (PULMONARY THROMBOEMBOLISM) (H): ICD-10-CM

## 2024-10-24 DIAGNOSIS — E03.9 HYPOTHYROIDISM, UNSPECIFIED TYPE: ICD-10-CM

## 2024-10-24 DIAGNOSIS — E78.49 OTHER HYPERLIPIDEMIA: ICD-10-CM

## 2024-10-24 DIAGNOSIS — I50.33 ACUTE ON CHRONIC HEART FAILURE WITH PRESERVED EJECTION FRACTION (H): ICD-10-CM

## 2024-10-24 DIAGNOSIS — J44.9 CHRONIC OBSTRUCTIVE PULMONARY DISEASE, UNSPECIFIED COPD TYPE (H): ICD-10-CM

## 2024-10-24 DIAGNOSIS — Z78.9 TAKES DIETARY SUPPLEMENTS: ICD-10-CM

## 2024-10-24 DIAGNOSIS — F32.A DEPRESSION, UNSPECIFIED DEPRESSION TYPE: ICD-10-CM

## 2024-10-24 DIAGNOSIS — I25.10 CORONARY ARTERY DISEASE INVOLVING NATIVE HEART WITHOUT ANGINA PECTORIS, UNSPECIFIED VESSEL OR LESION TYPE: ICD-10-CM

## 2024-10-24 DIAGNOSIS — R42 ORTHOSTATIC DIZZINESS: Primary | ICD-10-CM

## 2024-10-24 DIAGNOSIS — Z96.612 S/P REVERSE TOTAL SHOULDER ARTHROPLASTY, LEFT: ICD-10-CM

## 2024-10-24 DIAGNOSIS — K59.00 CONSTIPATION, UNSPECIFIED CONSTIPATION TYPE: ICD-10-CM

## 2024-10-24 PROCEDURE — 99207 PR NO CHARGE LOS: CPT | Mod: 95 | Performed by: PHARMACIST

## 2024-10-24 NOTE — PROGRESS NOTES
Medication Therapy Management (MTM) Encounter    ASSESSMENT:                            Medication Adherence/Access: No issues identified.    Dizziness and near syncopal episode  Improved.    Hypertension/A. Fib/HFpEF/H/o PE/DVT/CAD   Closely monitoring.  Blood pressure has been at goal <130/80mmHg.  Weight has increased >5 lbs in the last week, may need additional diuresis (balancing hypotension).      Hyperlipidemia   Stable. LDL is at goal <55mg/dL.     COPD   Stable     Constipation  Stable.     Anxiety and Depression  Stable.     Hypothyroidism  Stable. Last TSH is within normal limits.       BPH  Stable.    Pain  Stable.    Supplements  Stable.      PLAN:                            Continue to monitor your weight closely - let us know if it does not trend down tomorrow.    Follow-up: Return in 6 days (on 10/30/2024) for Physical Exam, Lab Work with Dr. Heath.  With MTM based on results.    SUBJECTIVE/OBJECTIVE:                          Jaun Craven is a 84 year old male seen for a follow-up visit.       Reason for visit: Hospital follow-up visit.    Tobacco: He reports that he quit smoking about 35 years ago. His smoking use included cigarettes. He started smoking about 65 years ago. He has a 15 pack-year smoking history. He has been exposed to tobacco smoke. He has never used smokeless tobacco.  Alcohol: none    Medication Adherence/Access: no issues reported.    Dizziness and near syncopal episode  Hospitalized 10/18/24-10/20/24 for dyspnea on exertion, dizziness and near syncopal episode and generalized weakness  Several medications were stopped during visit  He's overall feeling stronger each day    Hypertension /A. Fib/HFpEF/H/o PE/DVT/CAD   Aspirin 81mg daily  Xarelto 20mg daily  Diltiazem ER 300mg daily  Furosemide 20mg twice daily (with KCl 10mEq twice daily)  He notes dizziness has improved, is still having some orthostatic drop in blood pressure (see 10/21 myChart encounter).   Denies other side  "effects including signs and symptoms of bleeding.    Of note, weight has increased since discharge; stable the last 2 days at 258# (was 252# upon returning home)  He denies any shortness of breath   He is wearing compression stockings daily  Last EF 10/18/2024 - 60-65%, stable from previous    Patient has history of coronary artery disease with stenting of the proximal left anterior descending artery and also stenting of the proximal to mid right coronary artery in 2014.  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       Hyperlipidemia   Atorvastatin 80mg daily  Patient reports no significant myalgias or other side effects.      COPD   Albuterol MDI as needed - no recent use  Trelegy Ellipta 200mcg daily  Patient reports no current medication side effects.    Patient reports the following symptoms: none.      Constipation   Miralax 17g daily  Patient feels that current therapy is effective.   Patient reports no current medication side effects.       Mental Health   Anxiety and Depression  Venlafaxine 75mg twice daily.   Patient reports no current medication side effects.  Patient reports symptoms are stable.       Hypothyroidism   Levothyroxine 175 mcg daily 6x/week and 350mcg once weekly  Patient is having the following symptoms: none.       BPH  Tamsulosin 0.4 mg daily  He reports urinary symptoms are \"doing pretty good\"  No side effects other than orthostatic hypotension reported.    Pain  Acetaminophen 1000mg three times daily as needed - generally using twice daily   He reports pain is stable.  Denies side effects.    Supplements   Ferrous sulfate 45mg every other day   Daily multivitamin   No reported issues at this time.       Today's Vitals: There were no vitals taken for this visit.  ----------------  Post Discharge Medication Reconciliation Status: discharge medications reconciled and changed, per note/orders.    I spent 17 minutes with this patient today. A copy of the visit note was provided to the " patient's provider(s).    A summary of these recommendations was sent via [employer].    Kassi Glez PharmD, [facility]  Medication Therapy Management Provider  [phone]     Telemedicine Visit Details  The patient's medications can be safely assessed via a telemedicine encounter.  Type of service:  Video Conference via [employer]  Originating Location (pt. Location): Home    Distant Location (provider location):  Off-site  Start Time: 3:37 PM  End Time: 3:54 PM     Medication Therapy Recommendations  No medication therapy recommendations to display

## 2024-10-25 ENCOUNTER — MYC MEDICAL ADVICE (OUTPATIENT)
Dept: PHARMACY | Facility: CLINIC | Age: 84
End: 2024-10-25
Payer: MEDICARE

## 2024-10-25 ENCOUNTER — MYC MEDICAL ADVICE (OUTPATIENT)
Dept: FAMILY MEDICINE | Facility: CLINIC | Age: 84
End: 2024-10-25
Payer: MEDICARE

## 2024-10-25 DIAGNOSIS — I50.32 CHRONIC DIASTOLIC (CONGESTIVE) HEART FAILURE (H): Primary | ICD-10-CM

## 2024-10-25 RX ORDER — METOLAZONE 2.5 MG/1
2.5 TABLET ORAL EVERY OTHER DAY
Qty: 45 TABLET | Refills: 0 | Status: SHIPPED | OUTPATIENT
Start: 2024-10-25

## 2024-10-25 NOTE — TELEPHONE ENCOUNTER
Responded via separate [employer] message.  No action needed on this encounter.  Kassi Glez, ElliD, [facility]  Medication Therapy Management Provider  [phone]

## 2024-10-25 NOTE — TELEPHONE ENCOUNTER
Dr. Heath - I'm thinking we bump up the furosemide a bit; thoughts?  Kassi Glez, PharmD, [facility]  Medication Therapy Management Provider  [phone]

## 2024-10-25 NOTE — TELEPHONE ENCOUNTER
Real Solitario thank you for follow-up and seeing the patient.  You would want to increase Lasix or add a small dose of metolazone 30 minutes before Lasix every other day.  I am little concerned with the orthostatic drop in blood pressure.  Anne

## 2024-10-25 NOTE — TELEPHONE ENCOUNTER
Patient notified via [employer].  BMP can be re-checked at upcoming primary care physician visit next week.  Elli WintersD, [facility]  Medication Therapy Management Provider  [phone]

## 2024-10-25 NOTE — TELEPHONE ENCOUNTER
We could try metolazone - 2.5mg every other day 30 mins prior to furosemide?    Kassi Glez, PharmD, [facility]  Medication Therapy Management Provider  [phone]

## 2024-10-28 ENCOUNTER — MYC MEDICAL ADVICE (OUTPATIENT)
Dept: FAMILY MEDICINE | Facility: CLINIC | Age: 84
End: 2024-10-28
Payer: MEDICARE

## 2024-10-29 ENCOUNTER — TELEPHONE (OUTPATIENT)
Dept: OTHER | Facility: CLINIC | Age: 84
End: 2024-10-29
Payer: MEDICARE

## 2024-10-29 DIAGNOSIS — I82.4Z9 DEEP VEIN THROMBOSIS (DVT) OF DISTAL VEIN OF LOWER EXTREMITY, UNSPECIFIED CHRONICITY, UNSPECIFIED LATERALITY (H): ICD-10-CM

## 2024-10-29 DIAGNOSIS — E61.1 LOW IRON: ICD-10-CM

## 2024-10-29 DIAGNOSIS — I48.0 PAROXYSMAL A-FIB (H): ICD-10-CM

## 2024-10-29 NOTE — TELEPHONE ENCOUNTER
Patient has a years worth of Xarelto refills sent to [facility]'s pharmacy last week.  I returned call to Jaun and due to cost he would like the pharmacy switched to [facility] pharmacy - discussed I am not familiar with [facility] pharmacy and unable to find it when searching by name. It was then discussed that the pharmacy he is requesting is [facility] pharmacy which also goes by the name of [facility] pharmacy. Xarelto RX re-sent to [facility] pharmacy per patient request.    Ivis BARGER, RN    [facility][note 3]  Office: [phone]  Fax: [phone]

## 2024-10-29 NOTE — TELEPHONE ENCOUNTER
[facility] VASCULAR HEALTH CENTER    Who is the name of the provider?:  JANUSZ MENDEZ   What is the location you see this provider at/preferred location?: [place]  Person calling / Facility: Erich Craven  Phone number:  [phone] (home)   Nurse call back needed:  no     Reason for call:  Patient would like his Zarelto Rx faxed to [phone].      Can we leave a detailed message on this number?  YES     10/29/2024, 9:32 AM

## 2024-10-29 NOTE — TELEPHONE ENCOUNTER
[facility]ASCULAR HEALTH CENTER    Who is the name of the provider?:  JANUSZ MENDEZ   What is the location you see this provider at/preferred location?: [place]  Person calling / Facility: Erich Craven  Phone number:  [phone] (home)   Nurse call back needed:  no     Reason for call:  Patient called back it is [facility].    Pharmacy location:     [id number] [address] [facility] - [address]  Outside Imaging: n/a   Can we leave a detailed message on this number?  YES     10/29/2024, 10:25 AM

## 2024-10-29 NOTE — TELEPHONE ENCOUNTER
Need the name of the pharmacy patient is requesting which is not listed in encounter below- then request will be processed.     Ivis BARGER, RN    [facility]  Office: [phone]  Fax: [phone]

## 2024-10-30 ENCOUNTER — OFFICE VISIT (OUTPATIENT)
Dept: FAMILY MEDICINE | Facility: CLINIC | Age: 84
End: 2024-10-30
Payer: COMMERCIAL

## 2024-10-30 VITALS
SYSTOLIC BLOOD PRESSURE: 117 MMHG | WEIGHT: 263.4 LBS | TEMPERATURE: 97.7 F | BODY MASS INDEX: 35.68 KG/M2 | HEART RATE: 66 BPM | HEIGHT: 72 IN | OXYGEN SATURATION: 96 % | RESPIRATION RATE: 16 BRPM | DIASTOLIC BLOOD PRESSURE: 73 MMHG

## 2024-10-30 DIAGNOSIS — I89.0 LYMPHEDEMA: ICD-10-CM

## 2024-10-30 DIAGNOSIS — I48.0 PAROXYSMAL A-FIB (H): ICD-10-CM

## 2024-10-30 DIAGNOSIS — N18.2 TYPE 2 DIABETES MELLITUS WITH STAGE 2 CHRONIC KIDNEY DISEASE, WITHOUT LONG-TERM CURRENT USE OF INSULIN (H): ICD-10-CM

## 2024-10-30 DIAGNOSIS — J42 CHRONIC BRONCHITIS, UNSPECIFIED CHRONIC BRONCHITIS TYPE (H): ICD-10-CM

## 2024-10-30 DIAGNOSIS — Z13.21 ENCOUNTER FOR VITAMIN DEFICIENCY SCREENING: ICD-10-CM

## 2024-10-30 DIAGNOSIS — T50.2X5A SIDE EFFECT OF POTASSIUM WASTING DIURETIC: ICD-10-CM

## 2024-10-30 DIAGNOSIS — E61.1 LOW IRON: ICD-10-CM

## 2024-10-30 DIAGNOSIS — G93.9 LESION OF PONS: ICD-10-CM

## 2024-10-30 DIAGNOSIS — R29.898 WEAKNESS OF LOWER EXTREMITY, UNSPECIFIED LATERALITY: ICD-10-CM

## 2024-10-30 DIAGNOSIS — D32.0 MENINGIOMA, CEREBRAL (H): ICD-10-CM

## 2024-10-30 DIAGNOSIS — S40.022S CONTUSION OF BOTH UPPER EXTREMITIES, SEQUELA: ICD-10-CM

## 2024-10-30 DIAGNOSIS — Z91.81 AT MODERATE RISK FOR FALL: ICD-10-CM

## 2024-10-30 DIAGNOSIS — I10 PRIMARY HYPERTENSION: Primary | ICD-10-CM

## 2024-10-30 DIAGNOSIS — G47.33 OSA (OBSTRUCTIVE SLEEP APNEA): ICD-10-CM

## 2024-10-30 DIAGNOSIS — J43.9 PULMONARY EMPHYSEMA, UNSPECIFIED EMPHYSEMA TYPE (H): ICD-10-CM

## 2024-10-30 DIAGNOSIS — S41.132A PUNCTURE WOUND OF LEFT UPPER ARM, INITIAL ENCOUNTER: ICD-10-CM

## 2024-10-30 DIAGNOSIS — R91.8 PULMONARY NODULES: ICD-10-CM

## 2024-10-30 DIAGNOSIS — F10.21 ALCOHOL DEPENDENCE IN REMISSION (H): ICD-10-CM

## 2024-10-30 DIAGNOSIS — I50.32 CHRONIC HEART FAILURE WITH PRESERVED EJECTION FRACTION (H): ICD-10-CM

## 2024-10-30 DIAGNOSIS — R42 DIZZINESS: ICD-10-CM

## 2024-10-30 DIAGNOSIS — S40.021S CONTUSION OF BOTH UPPER EXTREMITIES, SEQUELA: ICD-10-CM

## 2024-10-30 DIAGNOSIS — D64.9 MILD ANEMIA: ICD-10-CM

## 2024-10-30 DIAGNOSIS — E11.22 TYPE 2 DIABETES MELLITUS WITH STAGE 2 CHRONIC KIDNEY DISEASE, WITHOUT LONG-TERM CURRENT USE OF INSULIN (H): ICD-10-CM

## 2024-10-30 DIAGNOSIS — R90.89 ABNORMAL BRAIN MRI: ICD-10-CM

## 2024-10-30 LAB
BASOPHILS # BLD AUTO: 0 10E3/UL (ref 0–0.2)
BASOPHILS NFR BLD AUTO: 0 %
EOSINOPHIL # BLD AUTO: 0.2 10E3/UL (ref 0–0.7)
EOSINOPHIL NFR BLD AUTO: 2 %
ERYTHROCYTE [DISTWIDTH] IN BLOOD BY AUTOMATED COUNT: 15.2 % (ref 10–15)
EST. AVERAGE GLUCOSE BLD GHB EST-MCNC: 117 MG/DL
HBA1C MFR BLD: 5.7 % (ref 0–5.6)
HCT VFR BLD AUTO: 39.3 % (ref 40–53)
HGB BLD-MCNC: 12.7 G/DL (ref 13.3–17.7)
IMM GRANULOCYTES # BLD: 0.1 10E3/UL
IMM GRANULOCYTES NFR BLD: 1 %
LYMPHOCYTES # BLD AUTO: 2.5 10E3/UL (ref 0.8–5.3)
LYMPHOCYTES NFR BLD AUTO: 26 %
MCH RBC QN AUTO: 31.8 PG (ref 26.5–33)
MCHC RBC AUTO-ENTMCNC: 32.3 G/DL (ref 31.5–36.5)
MCV RBC AUTO: 99 FL (ref 78–100)
MONOCYTES # BLD AUTO: 0.7 10E3/UL (ref 0–1.3)
MONOCYTES NFR BLD AUTO: 7 %
NEUTROPHILS # BLD AUTO: 6.3 10E3/UL (ref 1.6–8.3)
NEUTROPHILS NFR BLD AUTO: 65 %
PLATELET # BLD AUTO: 196 10E3/UL (ref 150–450)
RBC # BLD AUTO: 3.99 10E6/UL (ref 4.4–5.9)
WBC # BLD AUTO: 9.7 10E3/UL (ref 4–11)

## 2024-10-30 PROCEDURE — 85025 COMPLETE CBC W/AUTO DIFF WBC: CPT | Performed by: INTERNAL MEDICINE

## 2024-10-30 PROCEDURE — 84439 ASSAY OF FREE THYROXINE: CPT | Performed by: INTERNAL MEDICINE

## 2024-10-30 PROCEDURE — 83036 HEMOGLOBIN GLYCOSYLATED A1C: CPT | Performed by: INTERNAL MEDICINE

## 2024-10-30 PROCEDURE — 82728 ASSAY OF FERRITIN: CPT | Performed by: INTERNAL MEDICINE

## 2024-10-30 PROCEDURE — 80053 COMPREHEN METABOLIC PANEL: CPT | Performed by: INTERNAL MEDICINE

## 2024-10-30 PROCEDURE — 83735 ASSAY OF MAGNESIUM: CPT | Performed by: INTERNAL MEDICINE

## 2024-10-30 PROCEDURE — 84443 ASSAY THYROID STIM HORMONE: CPT | Performed by: INTERNAL MEDICINE

## 2024-10-30 PROCEDURE — 82607 VITAMIN B-12: CPT | Performed by: INTERNAL MEDICINE

## 2024-10-30 PROCEDURE — 82306 VITAMIN D 25 HYDROXY: CPT | Performed by: INTERNAL MEDICINE

## 2024-10-30 PROCEDURE — 99495 TRANSJ CARE MGMT MOD F2F 14D: CPT | Performed by: INTERNAL MEDICINE

## 2024-10-30 PROCEDURE — 36415 COLL VENOUS BLD VENIPUNCTURE: CPT | Performed by: INTERNAL MEDICINE

## 2024-10-30 RX ORDER — UREA 10 %
45 LOTION (ML) TOPICAL EVERY OTHER DAY
Qty: 45 TABLET | Refills: 0 | Status: SHIPPED | OUTPATIENT
Start: 2024-10-30

## 2024-10-30 RX ORDER — UREA 10 %
45 LOTION (ML) TOPICAL EVERY OTHER DAY
Qty: 45 TABLET | Refills: 0 | Status: SHIPPED | OUTPATIENT
Start: 2024-10-30 | End: 2024-10-30

## 2024-10-30 ASSESSMENT — PAIN SCALES - GENERAL: PAINLEVEL_OUTOF10: NO PAIN (0)

## 2024-10-30 NOTE — PROGRESS NOTES
Assessment & Plan   Problem List Items Addressed This Visit       HTN (hypertension) - Primary    RIN (obstructive sleep apnea)    COPD (chronic obstructive pulmonary disease) (H)    Relevant Orders    Adult Pulmonary Medicine  Referral    Adult Pulmonary Medicine  Referral    Paroxysmal A-fib (H)    Relevant Orders    CBC with Platelets & Differential (Completed)    Lymphedema    (HFpEF) heart failure with preserved ejection fraction (H)    Relevant Orders    Comprehensive metabolic panel (BMP + Alb, Alk Phos, ALT, AST, Total. Bili, TP) (Completed)    Type 2 diabetes mellitus with stage 2 chronic kidney disease, without long-term current use of insulin (H)    Relevant Orders    Hemoglobin A1c (Completed)    Alcohol dependence in remission (H)     Other Visit Diagnoses       Low iron        Relevant Medications    ferrous sulfate 45 MG TBCR CR tablet    Mild anemia        Relevant Medications    ferrous sulfate 45 MG TBCR CR tablet    Other Relevant Orders    Ferritin (Completed)    Dizziness        Relevant Orders    Comprehensive metabolic panel (BMP + Alb, Alk Phos, ALT, AST, Total. Bili, TP) (Completed)    Side effect of potassium wasting diuretic        Relevant Orders    Magnesium (Completed)    Encounter for vitamin deficiency screening        Relevant Orders    Vitamin B12 (Completed)    Vitamin D Deficiency (Completed)    Body mass index (BMI) 35.0-35.9, adult        Relevant Orders    Vitamin D Deficiency (Completed)    Abnormal brain MRI        Meningioma, cerebral (H)        Relevant Orders    Adult Neurology  Referral    Pulmonary nodules        Relevant Orders    Adult Pulmonary Medicine  Referral    Lesion of dominique        Relevant Orders    Adult Neurology  Referral    At moderate risk for fall        Relevant Orders    Physical Therapy  Referral    Contusion of both upper extremities, sequela        Weakness of lower extremity, unspecified  laterality        Relevant Orders    Physical Therapy  Referral    Puncture wound of left upper arm, initial encounter        With foreign body/piece of glass.  No evidence of cellulitis.  No purulent collection continue closely monitor.  Intact neurovascular exam.           Repeat labs today.  Extensive bruising on upper extremities on Xarelto and aspirin ; he does need the aspirin he has underlying coronary artery disease.  He is at risk of falls he describes some balance issues ,will refer to PT for some exercise of his lower extremities and muscle training.  He would like to see pulmonary specialist locally.  Referral placed as history of COPD he is maintained on Trelegy working well for him.  He is on Lasix 20 mg twice daily in addition to metolazone every other day ,seems to help with the swelling.  Will repeat chemistry panel today we reviewed his echo; his ejection fraction has improved he does have chronic diastolic heart failure although the pulmonary pressures are within normal which is reassuring.  Hemodynamics are stable pulse ox 96% on room air.  Lung auscultation coarse crackles right mid lung base more than left lower base.  Breathing comfortably not using accessory muscles ,pulse ox 96% no increasing phlegm has some clear phlegm in the morning.  If any worsening of cough or purulence or  increased phlegm will repeat a chest chest x-ray imaging.  Chronic lymphedema bilateral lower extremity left more than right +2 pitting in the left lower extremity.  No open sores or oozing lesions.  Extensive bruising and ecchymosis in his upper extremities but none over his lower extremity.  Abdomen soft on exam and neurologic exams no deficit.  Heart sounds quiet; no murmurs obvious.  No rubs or clicks.     MED REC REQUIRED  Post Medication Reconciliation Status: discharge medications reconciled, continue medications without change  Work on weight loss  Regular exercise  See Patient Instructions  Total  time spent was 40 minutes review of records addressing multiple health condition, review of labs, addressing prior imaging, in addition to office visit and documentation.    Subjective   Jaun is a 84 year old, presenting for the following health issues:  Hospital F/U, Recheck Medication, and Leg Swelling (Left is worse than right)        10/30/2024     2:39 PM   Additional Questions   Roomed by Karen     KEMAR Zamorano presenting for follow-up.  His dizziness is improving.  He is only using lisinopril as needed with blood pressure systolic above 140.  He has extensive bruising in his arms and he had cut his distal left forearm as well.  Has chronic lymphedema wearing compression stockings.  His weight has been stable at 262 to 261 pounds.  He denies generalized weakness but he does feel somehow weak in his legs and affecting his balance.  Known lung disease and coronary artery disease.  On further review his brain imaging showed a dominique lesion and a small meningioma he was not aware of.  He wants a referral to pulmonary specialist.  Has chronic emphysema and small pulmonary nodules.    Hospital Follow-up Visit:    Hospital/Nursing Home/IP Rehab Facility: [facility]  Date of Admission: 10/18/2024   Date of Discharge: 10/20/2024   Reason(s) for Admission: Dyspnea on exertion  Was the patient in the ICU or did the patient experience delirium during hospitalization?  No  Do you have any other stressors you would like to discuss with your provider? No    Problems taking medications regularly:  None  Medication changes since discharge: CONTINUE these medications which have CHANGED    Details  levothyroxine (SYNTHROID/LEVOTHROID) 175 MCG tablet Take 1 tablet (175 mcg) by mouth six times a week. 350 mcg on Fridays, No Print Out      Problems adhering to non-medication therapy:  None    Summary of hospitalization:  [facility] discharge summary reviewed  Diagnostic Tests/Treatments  reviewed.  Follow up needed:        Follow-up and recommended labs and tests      Follow up with primary care provider, Anne Heath, within 7 days for hospital follow- up.  No follow up labs or test are needed.      Other Healthcare Providers Involved in Patient s Care:         None  Update since discharge: improved.         Plan of care communicated with patient           Review of Systems  Constitutional, HEENT, cardiovascular, pulmonary, gi and gu systems are negative, except as otherwise noted.      Objective    /73 (BP Location: Right arm, Patient Position: Sitting, Cuff Size: Adult Large)   Pulse 66   Temp 97.7  F (36.5  C) (Temporal)   Resp 16   Ht 1.829 m (6')   Wt 119.5 kg (263 lb 6.4 oz)   SpO2 96%   BMI 35.72 kg/m    Body mass index is 35.72 kg/m .  Physical Exam   GENERAL: alert and no distress  EYES: Eyes grossly normal to inspection, PERRL and conjunctivae and sclerae normal  NECK: no adenopathy, no asymmetry, masses, or scars  RESP: Coarse rhonchi right middle lobe and right base more than left lower lobe.  No use of accessory muscles.  No wheezing.  CV: regular rate and rhythm, normal S1 S2, no S3 or S4, no murmur, click or rub, no peripheral edema  ABDOMEN: soft, slightly distended, nontender, no hepatosplenomegaly, no masses and bowel sounds normal  MS: no gross musculoskeletal defects noted, positive lymphedema bilateral lower extremity left more than right +2 pitting.  SKIN: no suspicious lesions or rashes ; has extensive ecchymosis and bruise over extensor surface of both upper extremity had a small puncture wound on the left palm area that seems to have scabbed.  Foreign body injury with a piece of glass.  Also healed open wound on the distal extensor surface of left upper extremity.  NEURO: Normal strength and tone, mentation intact and speech normal  PSYCH: mentation appears normal, affect normal/bright    Admission on 10/18/2024, Discharged on 10/20/2024   Component Date  Value Ref Range Status    Ventricular Rate 10/18/2024 66  BPM Final    Atrial Rate 10/18/2024 66  BPM Final    UT Interval 10/18/2024 212  ms Final    QRS Duration 10/18/2024 90  ms Final    QT 10/18/2024 392  ms Final    QTc 10/18/2024 410  ms Final    P Axis 10/18/2024 72  degrees Final    R AXIS 10/18/2024 103  degrees Final    T Axis 10/18/2024 83  degrees Final    Interpretation ECG 10/18/2024    Final                    Value:Sinus rhythm with sinus arrhythmia with 1st degree A-V block  Rightward axis  Borderline ECG  When compared with ECG of 22-Apr-2024 18:56,  Questionable change in QRS axis  Non-specific change in ST segment in Inferior leads  T wave amplitude has decreased in Lateral leads  Confirmed by GENERATED REPORT, COMPUTER ([id number]),  LORNE LU ([id number]) on 10/18/2024 12:50:35 PM      Sodium 10/18/2024 135  135 - 145 mmol/L Final    Potassium 10/18/2024 4.1  3.4 - 5.3 mmol/L Final    Carbon Dioxide (CO2) 10/18/2024 30 (H)  22 - 29 mmol/L Final    Anion Gap 10/18/2024 11  7 - 15 mmol/L Final    Urea Nitrogen 10/18/2024 27.6 (H)  8.0 - 23.0 mg/dL Final    Creatinine 10/18/2024 1.39 (H)  0.67 - 1.17 mg/dL Final    GFR Estimate 10/18/2024 50 (L)  >60 mL/min/1.73m2 Final    eGFR calculated using 2021 CKD-EPI equation.    Calcium 10/18/2024 9.5  8.8 - 10.4 mg/dL Final    Reference intervals for this test were updated on 7/16/2024 to reflect our healthy population more accurately. There may be differences in the flagging of prior results with similar values performed with this method. Those prior results can be interpreted in the context of the updated reference intervals.    Chloride 10/18/2024 94 (L)  98 - 107 mmol/L Final    Glucose 10/18/2024 84  70 - 99 mg/dL Final    Alkaline Phosphatase 10/18/2024 125  40 - 150 U/L Final    AST 10/18/2024 31  0 - 45 U/L Final    ALT 10/18/2024 26  0 - 70 U/L Final    Protein Total 10/18/2024 7.0  6.4 - 8.3 g/dL Final    Albumin 10/18/2024 4.2  3.5 - 5.2 g/dL  Final    Bilirubin Total 10/18/2024 0.3  <=1.2 mg/dL Final    WBC Count 10/18/2024 9.5  4.0 - 11.0 10e3/uL Final    RBC Count 10/18/2024 3.88 (L)  4.40 - 5.90 10e6/uL Final    Hemoglobin 10/18/2024 12.4 (L)  13.3 - 17.7 g/dL Final    Hematocrit 10/18/2024 37.8 (L)  40.0 - 53.0 % Final    MCV 10/18/2024 97  78 - 100 fL Final    MCH 10/18/2024 32.0  26.5 - 33.0 pg Final    MCHC 10/18/2024 32.8  31.5 - 36.5 g/dL Final    RDW 10/18/2024 14.7  10.0 - 15.0 % Final    Platelet Count 10/18/2024 186  150 - 450 10e3/uL Final    % Neutrophils 10/18/2024 68  % Final    % Lymphocytes 10/18/2024 23  % Final    % Monocytes 10/18/2024 7  % Final    % Eosinophils 10/18/2024 1  % Final    % Basophils 10/18/2024 1  % Final    % Immature Granulocytes 10/18/2024 1  % Final    NRBCs per 100 WBC 10/18/2024 0  <1 /100 Final    Absolute Neutrophils 10/18/2024 6.5  1.6 - 8.3 10e3/uL Final    Absolute Lymphocytes 10/18/2024 2.1  0.8 - 5.3 10e3/uL Final    Absolute Monocytes 10/18/2024 0.7  0.0 - 1.3 10e3/uL Final    Absolute Eosinophils 10/18/2024 0.1  0.0 - 0.7 10e3/uL Final    Absolute Basophils 10/18/2024 0.1  0.0 - 0.2 10e3/uL Final    Absolute Immature Granulocytes 10/18/2024 0.1  <=0.4 10e3/uL Final    Absolute NRBCs 10/18/2024 0.0  10e3/uL Final    Hold Specimen 10/18/2024 JIC   Final    Hold Specimen 10/18/2024 JI   Final    Troponin T, High Sensitivity 10/18/2024 60 (H)  <=22 ng/L Final    Either a High Sensitivity Troponin T baseline (0 hours) value = 100 ng/L, or an increase in High Sensitivity Troponin T = 7 ng/L at 2 hours compared to 0 hours (2-0 hours), suggests myocardial injury, and urgent clinical attention is required.    If the 2-0 hours increase is <7 ng/L, a High Sensitivity Troponin T result above gender-specific reference ranges warrants further evaluation.   Recommendations for further evaluation include correlation with clinical decision-making tool (e.g., HEART), a 3rd High Sensitivity Troponin T test 2 hours after  the 2nd (a 20% change from baseline would represent concern), admission for observation, close PCC/cardiology follow-up, or urgent outpatient provocative testing.    N terminal Pro BNP Inpatient 10/18/2024 <36  0 - 1,800 pg/mL Final    Reference range shown and results flagged as abnormal are suggested inpatient cut points for confirming diagnosis if CHF in an acute setting. Establishing a baseline value for each individual patient is useful for follow-up. An inpatient or emergency department NT-proPBNP <300 pg/mL effectively rules out acute CHF, with 99% negative predictive value.    The outpatient non-acute reference range for ruling out CHF is:  0-125 pg/mL (age 18 to less than 75)  0-450 pg/mL (age 75 yrs and older)     Influenza A PCR 10/18/2024 Negative  Negative Final    Influenza B PCR 10/18/2024 Negative  Negative Final    RSV PCR 10/18/2024 Negative  Negative Final    SARS CoV2 PCR 10/18/2024 Negative  Negative Final    NEGATIVE: SARS-CoV-2 (COVID-19) RNA not detected, presumed negative.    Troponin T, High Sensitivity 10/18/2024 55 (H)  <=22 ng/L Final    Either a High Sensitivity Troponin T baseline (0 hours) value = 100 ng/L, or an increase in High Sensitivity Troponin T = 7 ng/L at 2 hours compared to 0 hours (2-0 hours), suggests myocardial injury, and urgent clinical attention is required.    If the 2-0 hours increase is <7 ng/L, a High Sensitivity Troponin T result above gender-specific reference ranges warrants further evaluation.   Recommendations for further evaluation include correlation with clinical decision-making tool (e.g., HEART), a 3rd High Sensitivity Troponin T test 2 hours after the 2nd (a 20% change from baseline would represent concern), admission for observation, close PCC/cardiology follow-up, or urgent outpatient provocative testing.    Procalcitonin 10/18/2024 0.06  <0.50 ng/mL Final    Comment: Interpretation and Recommendations     <0.5 ng/mL:   Systemic bacterial infection  unlikely. Local bacterial infection is possible.     0.5-1.99 ng/mL:   Systemic bacterial infection possible, but various other conditions are known to induce PCT as well.     >=2.00 ng/mL:   Systemic bacterial infection likely, unless other causes are known.     Decision to start antibiotics should not be based on procalcitonin level alone. See Procalcitonin Guidance document for more details. [email or web address]-guidance-[email or web address]    Factors that may affect PCT levels (not all-inclusive):     - Increased PCT level           Severe trauma/burns           Invasive surgery           Cooling therapy after cardiac arrest/surgery           Treatment with agents which stimulate cytokines           Acute kidney injury           Chronic kidney disease and end stage renal disease           Acute graft vs host disease           Non-specific shock                            causing decreased organ perfusion and/or infarction       - Normal or unchanged PCT level           Early in infections (if low and infection is suspected, repeating in 6-12 hours is recommended)           Chronic infections (endocarditis, osteomyelitis, prosthetic device/graft infections)           Localized infections (cellulitis, wound infections, intra-abdominal abscess)     Note: PCT has not been extensively studied in pregnancy/breastfeeding, pediatrics, severe immunosuppression, and cystic fibrosis.    CRP Inflammation 10/18/2024 <3.00  <5.00 mg/L Final    LVEF  10/19/2024 60-65%   Final    Sodium 10/19/2024 136  135 - 145 mmol/L Final    Potassium 10/19/2024 3.7  3.4 - 5.3 mmol/L Final    Chloride 10/19/2024 101  98 - 107 mmol/L Final    Carbon Dioxide (CO2) 10/19/2024 27  22 - 29 mmol/L Final    Anion Gap 10/19/2024 8  7 - 15 mmol/L Final    Urea Nitrogen 10/19/2024 20.6  8.0 - 23.0 mg/dL Final    Creatinine 10/19/2024 1.16  0.67 - 1.17 mg/dL Final    GFR Estimate 10/19/2024 62  >60  mL/min/1.73m2 Final    eGFR calculated using 2021 CKD-EPI equation.    Calcium 10/19/2024 9.3  8.8 - 10.4 mg/dL Final    Reference intervals for this test were updated on 7/16/2024 to reflect our healthy population more accurately. There may be differences in the flagging of prior results with similar values performed with this method. Those prior results can be interpreted in the context of the updated reference intervals.    Glucose 10/19/2024 98  70 - 99 mg/dL Final    WBC Count 10/19/2024 8.5  4.0 - 11.0 10e3/uL Final    RBC Count 10/19/2024 3.76 (L)  4.40 - 5.90 10e6/uL Final    Hemoglobin 10/19/2024 12.2 (L)  13.3 - 17.7 g/dL Final    Hematocrit 10/19/2024 37.0 (L)  40.0 - 53.0 % Final    MCV 10/19/2024 98  78 - 100 fL Final    MCH 10/19/2024 32.4  26.5 - 33.0 pg Final    MCHC 10/19/2024 33.0  31.5 - 36.5 g/dL Final    RDW 10/19/2024 14.3  10.0 - 15.0 % Final    Platelet Count 10/19/2024 158  150 - 450 10e3/uL Final    GLUCOSE BY METER POCT 10/19/2024 94  70 - 99 mg/dL Final           Signed Electronically by: Anne Heath MD

## 2024-10-30 NOTE — PATIENT INSTRUCTIONS
"Recommendations from today's MTM visit:                                                    MTM (medication therapy management) is a service provided by a clinical pharmacist designed to help you get the most of out of your medicines.   Today we reviewed what your medicines are for, how to know if they are working, that your medicines are safe and how to make your medicine regimen as easy as possible.      Continue to monitor your weight closely - let us know if it does not trend down tomorrow.    Follow-up: Return in 6 days (on 10/30/2024) for Physical Exam, Lab Work with Dr. Heath.  With MTM based on results.    It was great speaking with you today.  I value your experience and would be very thankful for your time in providing feedback in our clinic survey. In the next few days, you may receive an email or text message from [employer] with a link to a survey related to your  clinical pharmacist.\"     To schedule another MTM appointment, please call the clinic directly or you may call the MTM scheduling line at [phone] or toll-free at [phone].     My Clinical Pharmacist's contact information:                                                      Please feel free to contact me with any questions or concerns you have.      Kassi Glez, PharmD, [facility]  Medication Therapy Management Provider  [phone]    "

## 2024-10-31 ENCOUNTER — TELEPHONE (OUTPATIENT)
Dept: PULMONOLOGY | Facility: CLINIC | Age: 84
End: 2024-10-31
Payer: MEDICARE

## 2024-10-31 DIAGNOSIS — E03.9 HYPOTHYROIDISM, UNSPECIFIED TYPE: Primary | ICD-10-CM

## 2024-10-31 DIAGNOSIS — E03.9 HYPOTHYROIDISM, UNSPECIFIED TYPE: ICD-10-CM

## 2024-10-31 LAB
ALBUMIN SERPL BCG-MCNC: 4.3 G/DL (ref 3.5–5.2)
ALP SERPL-CCNC: 119 U/L (ref 40–150)
ALT SERPL W P-5'-P-CCNC: 28 U/L (ref 0–70)
ANION GAP SERPL CALCULATED.3IONS-SCNC: 11 MMOL/L (ref 7–15)
AST SERPL W P-5'-P-CCNC: 30 U/L (ref 0–45)
BILIRUB SERPL-MCNC: 0.4 MG/DL
BUN SERPL-MCNC: 18.1 MG/DL (ref 8–23)
CALCIUM SERPL-MCNC: 9.8 MG/DL (ref 8.8–10.4)
CHLORIDE SERPL-SCNC: 95 MMOL/L (ref 98–107)
CREAT SERPL-MCNC: 1.2 MG/DL (ref 0.67–1.17)
EGFRCR SERPLBLD CKD-EPI 2021: 59 ML/MIN/1.73M2
FERRITIN SERPL-MCNC: 74 NG/ML (ref 31–409)
GLUCOSE SERPL-MCNC: 88 MG/DL (ref 70–99)
HCO3 SERPL-SCNC: 31 MMOL/L (ref 22–29)
MAGNESIUM SERPL-MCNC: 2.2 MG/DL (ref 1.7–2.3)
POTASSIUM SERPL-SCNC: 3.5 MMOL/L (ref 3.4–5.3)
PROT SERPL-MCNC: 7.2 G/DL (ref 6.4–8.3)
SODIUM SERPL-SCNC: 137 MMOL/L (ref 135–145)
T4 FREE SERPL-MCNC: <0.1 NG/DL (ref 0.9–1.7)
TSH SERPL DL<=0.005 MIU/L-ACNC: 81.7 UIU/ML (ref 0.3–4.2)
VIT B12 SERPL-MCNC: 1250 PG/ML (ref 232–1245)
VIT D+METAB SERPL-MCNC: 41 NG/ML (ref 20–50)

## 2024-10-31 RX ORDER — LEVOTHYROXINE SODIUM 175 UG/1
TABLET ORAL
Qty: 103 TABLET | OUTPATIENT
Start: 2024-10-31

## 2024-10-31 RX ORDER — LEVOTHYROXINE SODIUM 175 UG/1
175 TABLET ORAL
Qty: 102 TABLET | Refills: 1 | Status: SHIPPED | OUTPATIENT
Start: 2024-10-31

## 2024-10-31 NOTE — TELEPHONE ENCOUNTER
[facility] Call Center    Phone Message    May a detailed message be left on voicemail: yes     Reason for Call: Appointment Intake    Referring Provider Name: JOSEEVIN  Diagnosis and/or Symptoms: Diagnosis: Chronic bronchitis, unspecified chronic bronchitis type (H)  Pulmonary nodules  Pulmonary emphysema, unspecified emphysema type (H)      Please review for general or interventional- nodules not in general for schedule, thank you , [employer] follow up     Action Taken: Message routed to:  [facility] pulm    Travel Screening: Not Applicable     Date of Service:

## 2024-10-31 NOTE — TELEPHONE ENCOUNTER
Called and spoke with pharmacy who stated the prescription is ready to be picked up.     Sent pt a MC message with message from above on it.

## 2024-10-31 NOTE — TELEPHONE ENCOUNTER
See MyChart from patient needing provider review.   Please write back directly to pt or advise if clinic follow up needed.     Lety JOSE, RN  [facility] RN Triage Team

## 2024-10-31 NOTE — TELEPHONE ENCOUNTER
New order sent for levothyroxine to [facility] pharmacy on record.  Please can we check with the pharmacy if anything else is needed.  Thank you for follow-up

## 2024-11-01 ENCOUNTER — MYC MEDICAL ADVICE (OUTPATIENT)
Dept: FAMILY MEDICINE | Facility: CLINIC | Age: 84
End: 2024-11-01

## 2024-11-01 DIAGNOSIS — J42 CHRONIC BRONCHITIS, UNSPECIFIED CHRONIC BRONCHITIS TYPE (H): Primary | ICD-10-CM

## 2024-11-01 NOTE — TELEPHONE ENCOUNTER
Pt calling regarding his medication refill request. Let pt know that this was sent in this afternoon. Pt was very thankful.    Ayse Ayers RN

## 2024-11-01 NOTE — TELEPHONE ENCOUNTER
Pulmonary referral received  Urgency: Priority 1-2 weeks  Patient scheduled for: 11/27/24  Provider: Dr Lanny Fuentes    Message routed to RN's to place PFT order.

## 2024-11-01 NOTE — RESULT ENCOUNTER NOTE
Real Zamorano, your thyroid test is abnormal ; TSH is very elevated 81 and free T4 actual thyroid hormone is very low, are you not taking your thyroid medication levothyroxine?  Please make sure you are/levothyroxine taking the medication; please as prescribed ; please let me know by labs we will need to repeat your thyroid test in 2 weeks please advise.  Regards,  Dr. Heath

## 2024-11-04 ENCOUNTER — TELEPHONE (OUTPATIENT)
Dept: FAMILY MEDICINE | Facility: CLINIC | Age: 84
End: 2024-11-04
Payer: MEDICARE

## 2024-11-04 DIAGNOSIS — E03.9 HYPOTHYROIDISM, UNSPECIFIED TYPE: Primary | ICD-10-CM

## 2024-11-04 NOTE — TELEPHONE ENCOUNTER
Ruby, I just spoke to the pt and he is confirmed for 11/27 as well. However, his PCP Dr. Heath is requesting that we keep the pt on your waiting list - in case there are any cancellations or changes.    Pt also said he's open to detailed voicemails at his mobile number, in case you need to reach him.    Thank you,    Trudi, Triage RN

## 2024-11-04 NOTE — TELEPHONE ENCOUNTER
Dr. Heath, please advise.    Spoke to pt and relayed all result notes from pt's 10/30 labs, and pt reports the following:    - Pt just resumed levothyroxine 3 days ago (Friday 11/1), doing 175mcg 6 days a week and 350mcg once a week on Fridays  - Pt taking potassium supplements as directed  - Pt will continue taking iron supplement  - Pt already has lab-only visit scheduled for 11/11 for nephrology labs (so you can ADD his TSH/T4 free on to be collected same-day)    Pt also said he is waiting to hear from Neuro regarding scheduling, as their care team is not sure yet who would be the best provider for the pt to see    Trudi Kaufman RN

## 2024-11-05 ENCOUNTER — MYC MEDICAL ADVICE (OUTPATIENT)
Dept: FAMILY MEDICINE | Facility: CLINIC | Age: 84
End: 2024-11-05
Payer: MEDICARE

## 2024-11-05 NOTE — TELEPHONE ENCOUNTER
Pt agreeable to provider's recommendations. Please advise, thank you!    Rinku Mcconnell RN  [facility]

## 2024-11-06 ENCOUNTER — TELEPHONE (OUTPATIENT)
Dept: MRI IMAGING | Facility: CLINIC | Age: 84
End: 2024-11-06

## 2024-11-06 DIAGNOSIS — G93.9 LESION OF PONS: ICD-10-CM

## 2024-11-06 DIAGNOSIS — D32.9 MENINGIOMA (H): Primary | ICD-10-CM

## 2024-11-06 NOTE — TELEPHONE ENCOUNTER
This device does not meet the FDA criteria for MRI approval at this facility. Patient has the option of further review at the [facility] where they have a process and staffing in place to perform off-label scans on non conditional devices.     Reason: [employer] generator with [employer] leads

## 2024-11-06 NOTE — TELEPHONE ENCOUNTER
Reason for call: MR Device Safety Clearance    Please create a MR Device Safety order  Patient is reporting patient has Pacemaker  Type of MR exam: MR Brain    Do you get your Device checked at [facility]?: Yes - [facility]

## 2024-11-07 NOTE — CONFIDENTIAL NOTE
RECORDS RECEIVED FROM: internal    DATE RECEIVED: 11.27.24    NOTES STATUS DETAILS   OFFICE NOTE from referring provider internal    Anne Heath MD      [facility]   [facility]    MEDICATION LIST internal     IMAGING  (NEED IMAGES AND REPORTS)     CT SCAN internal  10.18.24   CHEST XRAY (CXR) internal  10.18.24, 4.22.24, 2.14.24, 1.12.24, 12.15.23 more in epic    TESTS     PULMONARY FUNCTION TESTING (PFT) internal  Scheduled 11.27.24

## 2024-11-08 NOTE — TELEPHONE ENCOUNTER
Spoke to patient to schedule for a sooner appointment on 11/13 with Dr Campo. Pt is scheduled and aware of the updated appt.

## 2024-11-11 ENCOUNTER — LAB (OUTPATIENT)
Dept: LAB | Facility: CLINIC | Age: 84
End: 2024-11-11
Payer: COMMERCIAL

## 2024-11-11 ENCOUNTER — NURSE TRIAGE (OUTPATIENT)
Dept: FAMILY MEDICINE | Facility: CLINIC | Age: 84
End: 2024-11-11

## 2024-11-11 DIAGNOSIS — E03.9 HYPOTHYROIDISM, UNSPECIFIED TYPE: Primary | ICD-10-CM

## 2024-11-11 DIAGNOSIS — E83.52 HYPERCALCEMIA: ICD-10-CM

## 2024-11-11 DIAGNOSIS — N18.2 CHRONIC RENAL DISEASE, STAGE II: ICD-10-CM

## 2024-11-11 LAB
ALBUMIN SERPL BCG-MCNC: 4.1 G/DL (ref 3.5–5.2)
ANION GAP SERPL CALCULATED.3IONS-SCNC: 11 MMOL/L (ref 7–15)
BUN SERPL-MCNC: 17 MG/DL (ref 8–23)
CALCIUM SERPL-MCNC: 10 MG/DL (ref 8.8–10.4)
CHLORIDE SERPL-SCNC: 95 MMOL/L (ref 98–107)
CREAT SERPL-MCNC: 1.04 MG/DL (ref 0.67–1.17)
CREAT UR-MCNC: 32.4 MG/DL
EGFRCR SERPLBLD CKD-EPI 2021: 70 ML/MIN/1.73M2
GLUCOSE SERPL-MCNC: 100 MG/DL (ref 70–99)
HCO3 SERPL-SCNC: 34 MMOL/L (ref 22–29)
HGB BLD-MCNC: 12.2 G/DL (ref 13.3–17.7)
MICROALBUMIN UR-MCNC: <12 MG/L
MICROALBUMIN/CREAT UR: NORMAL MG/G{CREAT}
PHOSPHATE SERPL-MCNC: 3.1 MG/DL (ref 2.5–4.5)
POTASSIUM SERPL-SCNC: 3 MMOL/L (ref 3.4–5.3)
PTH-INTACT SERPL-MCNC: 70 PG/ML (ref 15–65)
SODIUM SERPL-SCNC: 140 MMOL/L (ref 135–145)
T4 FREE SERPL-MCNC: 0.85 NG/DL (ref 0.9–1.7)
TSH SERPL DL<=0.005 MIU/L-ACNC: 34.6 UIU/ML (ref 0.3–4.2)
VIT D+METAB SERPL-MCNC: 38 NG/ML (ref 20–50)

## 2024-11-11 PROCEDURE — 85018 HEMOGLOBIN: CPT

## 2024-11-11 PROCEDURE — 80069 RENAL FUNCTION PANEL: CPT

## 2024-11-11 PROCEDURE — 84443 ASSAY THYROID STIM HORMONE: CPT | Performed by: INTERNAL MEDICINE

## 2024-11-11 PROCEDURE — 82306 VITAMIN D 25 HYDROXY: CPT

## 2024-11-11 PROCEDURE — 82043 UR ALBUMIN QUANTITATIVE: CPT

## 2024-11-11 PROCEDURE — 84439 ASSAY OF FREE THYROXINE: CPT | Performed by: INTERNAL MEDICINE

## 2024-11-11 PROCEDURE — 82570 ASSAY OF URINE CREATININE: CPT

## 2024-11-11 PROCEDURE — 83970 ASSAY OF PARATHORMONE: CPT

## 2024-11-11 NOTE — TELEPHONE ENCOUNTER
Triage outreach    Attempt number 1    Left message to call back at [phone].    Lety RN  [facility]

## 2024-11-11 NOTE — TELEPHONE ENCOUNTER
"Nurse Triage SBAR    Is this a 2nd Level Triage? YES, LICENSED PRACTITIONER REVIEW IS REQUIRED    Situation: Patient messaged in [facility] about having a weird feeling. Patient states as his eyes get tiered he is having more trouble seeing far away. In addition he has felt dizzy for the last week. This morning 132/72 and the same standing. Patient feels his blood pressure is much better. Pulse was 66. Patient describes this dizzyness as \"lightheadedness- balance is off kilter\" able to walk normally. Patient as a result is walking much more carefully. Another description is he feels like \"the world is going slowly\".     Assessment: may need to be assessed  Protocol Recommended Disposition:   See in Office Within 2 Weeks    Recommendation: routing to PCP    Routed to provider    Does the patient meet one of the following criteria for ADS visit consideration? No    Reason for Disposition   Blurred vision or visual changes and gradual onset (e.g., weeks, months)   MILD dizziness (e.g., walking normally) and has NOT been evaluated by physician for this (Exception: Dizziness caused by heat exposure, sudden standing, or poor fluid intake.)    Additional Information   Negative: Weakness of the face, arm or leg on one side of the body   Negative: Followed getting substance in the eye   Negative: Foreign body stuck in the eye   Negative: Followed an eye injury   Negative: Followed sun lamp or sun exposure (UV keratitis)   Negative: Yellow or green discharge (pus) in the eye   Negative: Pregnant   Negative: Complete loss of vision in one or both eyes   Negative: SEVERE eye pain   Negative: SEVERE headache   Negative: Double vision   Negative: Blurred vision or visual changes and present now and sudden onset or new (e.g., minutes, hours, days)  (Exception: Seeing floaters / black specks OR previously diagnosed migraine headaches with same symptoms.)   Negative: Patient sounds very sick or weak to the triager   Negative: Flashes " of light  (Exception: Brief from pressing on the eyeball.)   Negative: Many floaters in the eye (Exception: Floater(s) are a chronic symptom and this is unchanged from patient's baseline pattern.)   Negative: Eye pain and brief (now gone) blurred vision or visual changes   Negative: Taking digoxin (e.g., Lanoxin, Digitek, Cardoxin, Lanoxicaps; Toloxin in [place]) and blurred vision, yellow vision, or yellow-green halos   Negative: Single floater (i.e., small speck seems to float across the eye)  (Exception: Floater(s) are a chronic symptom and this is unchanged from patient's baseline pattern.)   Negative: Brief (now gone) blurred vision and unexplained   Negative: Laser light exposure and blurred vision present > 15 minutes   Negative: Jaw pain while eating and age > 50 years   Negative: Headache and age > 50 years   Negative: Patient wants to be seen   Negative: MODERATE dizziness (e.g., interferes with normal activities)  (Exception: Dizziness caused by heat exposure, sudden standing, or poor fluid intake.)   Negative: Vomiting occurs with dizziness   Negative: Patient wants to be seen   Negative: Taking a medicine that could cause dizziness (e.g., blood pressure medications, diuretics)   Negative: SEVERE dizziness (e.g., unable to stand, requires support to walk, feels like passing out now)   Negative: SEVERE headache or neck pain   Negative: Spinning or tilting sensation (vertigo) present now and one or more stroke risk factors (i.e., hypertension, diabetes mellitus, prior stroke/TIA, heart attack, age over 60) (Exception: Prior physician evaluation for this AND no different/worse than usual.)   Negative: Neurologic deficit that was brief (now gone), ANY of the following:* Weakness of the face, arm, or leg on one side of the body* Numbness of the face, arm, or leg on one side of the body* Loss of speech or garbled speech   Negative: Loss of vision or double vision  (Exception: Similar to previous migraines.)    "Negative: Extra heartbeats, irregular heart beating, or heart is beating very fast (i.e., 'palpitations')   Negative: Difficulty breathing   Negative: Drinking very little and dehydration suspected (e.g., no urine > 12 hours, very dry mouth, very lightheaded)   Negative: Follows bleeding (e.g., stomach, rectum, vagina)  (Exception: Became dizzy from sight of small amount blood.)   Negative: Patient sounds very sick or weak to the triager   Negative: Lightheadedness (dizziness) present now, after 2 hours of rest and fluids   Negative: Spinning or tilting sensation (vertigo) present now   Negative: Fever > 103 F (39.4 C)   Negative: Fever > 100.0 F (37.8 C) and has diabetes mellitus or a weak immune system (e.g., HIV positive, cancer chemotherapy, organ transplant, splenectomy, chronic steroids)    Answer Assessment - Initial Assessment Questions  1. DESCRIPTION: \"How has your vision changed?\" (e.g., complete vision loss, blurred vision, double vision, floaters, etc.)      Cross over and harder to see at a distance   2. LOCATION: \"One or both eyes?\" If one, ask: \"Which eye?\"      Both eyes - especially when tied   3. SEVERITY: \"Can you see anything?\" If Yes, ask: \"What can you see?\" (e.g., fine print)      Gets a little less as eyes get tierd  4. ONSET: \"When did this begin?\" \"Did it start suddenly or has this been gradual?\"      About 10 days ago  5. PATTERN: \"Does this come and go, or has it been constant since it started?\"      Not constant  6. PAIN: \"Is there any pain in your eye(s)?\"  (Scale 1-10; or mild, moderate, severe)    - NONE (0): No pain.    - MILD (1-3): Doesn't interfere with normal activities.    - MODERATE (4-7): Interferes with normal activities or awakens from sleep.     - SEVERE (8-10): Excruciating pain, unable to do any normal activities.      No pain   7. CONTACTS-GLASSES: \"Do you wear contacts or glasses?\"      no  8. CAUSE: \"What do you think is causing this visual problem?\"      " "tierdness  9. OTHER SYMPTOMS: \"Do you have any other symptoms?\" (e.g., confusion, headache, arm or leg weakness, speech problems)      Lightheaded- a week   10. PREGNANCY: \"Is there any chance you are pregnant?\" \"When was your last menstrual period?\"        no    Answer Assessment - Initial Assessment Questions  1. DESCRIPTION: \"Describe your dizziness.\"      Liughtheaded- things moving slower   2. LIGHTHEADED: \"Do you feel lightheaded?\" (e.g., somewhat faint, woozy, weak upon standing)      Can't weak   3. VERTIGO: \"Do you feel like either you or the room is spinning or tilting?\" (i.e. vertigo)      no  4. SEVERITY: \"How bad is it?\"  \"Do you feel like you are going to faint?\" \"Can you stand and walk?\"    - MILD: Feels slightly dizzy, but walking normally.    - MODERATE: Feels unsteady when walking, but not falling; interferes with normal activities (e.g., school, work).    - SEVERE: Unable to walk without falling, or requires assistance to walk without falling; feels like passing out now.       Mild  5. ONSET:  \"When did the dizziness begin?\"      About a week   6. AGGRAVATING FACTORS: \"Does anything make it worse?\" (e.g., standing, change in head position)      No- as he is more tired   7. HEART RATE: \"Can you tell me your heart rate?\" \"How many beats in 15 seconds?\"  (Note: not all patients can do this)        Pulse and BP taken   8. CAUSE: \"What do you think is causing the dizziness?\"      Dont know  9. RECURRENT SYMPTOM: \"Have you had dizziness before?\" If Yes, ask: \"When was the last time?\" \"What happened that time?\"     NA  10. OTHER SYMPTOMS: \"Do you have any other symptoms?\" (e.g., fever, chest pain, vomiting, diarrhea, bleeding)        NA  11. PREGNANCY: \"Is there any chance you are pregnant?\" \"When was your last menstrual period?\"        NA    Protocols used: Vision Loss or Change-A-OH, Dizziness-A-OH    "

## 2024-11-12 ENCOUNTER — OFFICE VISIT (OUTPATIENT)
Dept: FAMILY MEDICINE | Facility: CLINIC | Age: 84
End: 2024-11-12
Payer: COMMERCIAL

## 2024-11-12 ENCOUNTER — THERAPY VISIT (OUTPATIENT)
Dept: OCCUPATIONAL THERAPY | Facility: CLINIC | Age: 84
End: 2024-11-12
Attending: INTERNAL MEDICINE
Payer: COMMERCIAL

## 2024-11-12 ENCOUNTER — MYC REFILL (OUTPATIENT)
Dept: FAMILY MEDICINE | Facility: CLINIC | Age: 84
End: 2024-11-12

## 2024-11-12 VITALS
HEART RATE: 61 BPM | SYSTOLIC BLOOD PRESSURE: 124 MMHG | WEIGHT: 266.6 LBS | OXYGEN SATURATION: 92 % | BODY MASS INDEX: 36.11 KG/M2 | DIASTOLIC BLOOD PRESSURE: 71 MMHG | RESPIRATION RATE: 16 BRPM | TEMPERATURE: 97.8 F | HEIGHT: 72 IN

## 2024-11-12 DIAGNOSIS — E87.6 HYPOKALEMIA: ICD-10-CM

## 2024-11-12 DIAGNOSIS — J42 CHRONIC BRONCHITIS, UNSPECIFIED CHRONIC BRONCHITIS TYPE (H): Primary | ICD-10-CM

## 2024-11-12 DIAGNOSIS — E03.9 HYPOTHYROIDISM, UNSPECIFIED TYPE: ICD-10-CM

## 2024-11-12 DIAGNOSIS — D32.0 MENINGIOMA, CEREBRAL (H): ICD-10-CM

## 2024-11-12 DIAGNOSIS — I89.0 LYMPHEDEMA: ICD-10-CM

## 2024-11-12 DIAGNOSIS — I50.32 CHRONIC DIASTOLIC HEART FAILURE (H): ICD-10-CM

## 2024-11-12 DIAGNOSIS — R42 DIZZINESS: Primary | ICD-10-CM

## 2024-11-12 PROCEDURE — 97165 OT EVAL LOW COMPLEX 30 MIN: CPT | Mod: GO | Performed by: OCCUPATIONAL THERAPIST

## 2024-11-12 PROCEDURE — 97140 MANUAL THERAPY 1/> REGIONS: CPT | Mod: GO | Performed by: OCCUPATIONAL THERAPIST

## 2024-11-12 PROCEDURE — 99215 OFFICE O/P EST HI 40 MIN: CPT | Performed by: PHYSICIAN ASSISTANT

## 2024-11-12 ASSESSMENT — PAIN SCALES - GENERAL: PAINLEVEL_OUTOF10: NO PAIN (0)

## 2024-11-12 NOTE — PROGRESS NOTES
"Assessment and Plan:     (R42) Dizziness  (primary encounter diagnosis)  Comment: acute on chronic disequilibrium x last couple weeks, feels balance is more off than normal, no falls, no new focal findings on exam, in discussion he is quite anxious about getting an MRI in a timely manner and wanted to see if could expedite, he is having an MRI in December, I would not be able to expedite his MRI unfortunately, I think it is reasonable to wait until then as symptoms are chronic and neuro exam is non-focal, if worsen would recommend going to the ED  Plan: has PT scheduled next week  Let me know if symptoms worsen prior to MRI    (E87.6) Hypokalemia  Comment: had K+ of 3 yesterday  Plan: increase potassium from 10MEQ bid to 20 in AM and 10 in evening, recheck on 11/18/24    (E03.9) Hypothyroidism, unspecified type  Comment: has TSH of 81.7 two weeks ago, repeat has improved with resumption of levothyroxine  Plan: cont levothyroxine, following with pcp    (D32.0) Meningioma, cerebral (H)  Comment: looks like this was seen on some imaging in 2011 then in 2018 and remained stable, has MRI scheduled for December   Plan: MRI as planned     [facility]HEL[facility] Same Day Provider   45 minutes on the day of the encounter doing chart review, history and exam, documentation and further activities as noted above.      Vernell Zamorano is a 84 year old, presenting for the following health issues:  Eye Problem and Dizziness (X 1 week )    History of Present Illness       Reason for visit:  Follow up   He is taking medications regularly.     Mr. Craven is here for eye and balance issue   He has had blood shot eyes x over a week  He notices some double vision with distances when he is tired, does not notice these symptoms when not tired     His balance has been off \"more than usual\"  He denies falls or syncope  He also denies chest pain, sob, palpitations, focal weakness  His pcp ordered MRI which is scheduled for " 12/17/24 for meningioma, lesion of dominique seen on CT in 2018  His pcp has referred him to see neurologist     Recent TSH very high at 81.7, now trending down    Yesterday potassium was 3.0    Will start PT next week        Objective    /71 (BP Location: Right arm, Patient Position: Sitting, Cuff Size: Adult Regular)   Pulse 61   Temp 97.8  F (36.6  C) (Temporal)   Resp 16   Ht 1.829 m (6')   Wt 120.9 kg (266 lb 9.6 oz)   SpO2 92%   BMI 36.16 kg/m    Body mass index is 36.16 kg/m .    Physical Exam     GENERAL: in NAD  ENT: tacky mm, op clear   RESP: lungs clear to auscultation - no rales, no rhonchi, no wheezes  CV: regular rates and rhythm, normal S1 S2, no S3 or S4 and no murmur, no click or rub   ABD: soft, NT  MS: extremities- no gross deformities noted, lymphedema wraps  NEUROLOGICAL EXAM:  Normal speech, tongue is midline, PERRL, no nystagmus, EOMI   strength equal/intact  Elbow flexion and extension is equal/intact  No pronator drift  Negative rapid alternating movements  Gait is steady, no ataxia              Signed Electronically by: Alexia Holland PA-C

## 2024-11-12 NOTE — TELEPHONE ENCOUNTER
Anne Heath MD Lantz, Whitney L, RN; [facility]13 hours ago (6:31 PM)     Recommend patient be seen in office next 24 hours with any available provider or go to urgent care.  Hold diuretics.  Increase dose of potassium as potassium was low.  Dr Heath

## 2024-11-12 NOTE — PATIENT INSTRUCTIONS
Increase potassium to two tablets (20 MEQ)  in the AM and one tablet (10 MEQ) in evening    Repeat potassium on Monday 11/18/24    Keep MRI appointment as scheduled in December    Let me know if symptoms worsen

## 2024-11-12 NOTE — PROGRESS NOTES
LOWER EXTREMITY ASSESSMENT FOR PNEUMATIC COMPRESSION DEVICE (PCD) - Entre Basic Pump      DIAGNOSIS: Lymphedema, not elsewhere classified [I89.0]  Secondary to: chronic venous insufficiency    LOCATION OF LYMPHEDEMA: Right Leg and Left Leg    DATE OF LYMPHEDEMA ONSET (OR DURATION): Chronic for many years. First lymphedema therapy 07/2023    CONSERVATIVE TREATMENT (CT):     Start Date: 07/05/2023  Patient adhered to each of the following conservative treatments for more than 4 weeks:    Regular use of compression bandage system or compression garment(s)? YES    Type: Compression garments with a minimum of 20-30 mm Hg distally, Velcro reduction kit with a minimum of 20-30 mm Hg distally, and  Other: compression stockings.    Regular exercise? YES Walking 2-3x/day.    Limb(s) elevation? YES. Elevates feet on x2 pillows nightly and in recliner during day    At least 30 minutes daily of MLD or self-MLD? YES. Pt will benefit from continued education in technique.    Diet modifications? YES. Pt is vegetarian. Attends to salt intake. Working on weight loss.    After adhering to conservative therapy for more than 4 weeks, is the patient still experiencing persistent lymphedema symptoms? YES    Has the patient tried an  Basic Pneumatic Compression Device? NO. Pt does not exhibit truncal lymphedema at this time. Request approval for Entre lymphedema pump to address distal bilateral lower extremity lymphedema symptoms.       SEVERITY, Symptoms, and observations from physical exam    Stage of Lymphedema (International Society of Lymphology clinical classification):   Stage 2: Limb elevation alone rarely reduces the tissue swelling and pitting is manifest. Later in Stage 2, the limb may not pit as excess subcutaneous fat and fibrosis develop.    Additional symptoms: Pitting Edema, Fibrosis, Hyperpigmentation, and Pain      MEASUREMENTS  Choose at least two consecutive anatomical locations in addition to chest and waist.    Measurements must be taken at least 30 days apart.     Date: 11/12/2024    Right Leg   Circumference (in cm)  Left Leg   Circumference (in cm)   Umbilicus (waist)   Umbilicus (waist)    Hips (ASIS)   Hips (ASIS)    10 cm (ankle) 29.9  10 cm (ankle) 32.0   20 cm   20 cm    30 cm (calf) 47.7  30 cm (calf) 48.3   40 cm   40 cm    50 cm (knee) 45.5  50 cm (knee) 46.7   60 cm (thigh)   60 cm (thigh)    70 cm   70 cm           11/12/24 0500   Appointment Info   Treating Provider Jaylene Russo, OTR/L, CLT   Visits Used 1 - Medicare / [facility]   Medical Diagnosis Lymphedema   OT Tx Diagnosis Lymphedema   Progress Note/Certification   Start Of Care Date 11/12/24   Onset of Illness/Injury or Date of Surgery 07/05/23  (Chronic for years. First evaluated in clinic in 2023)   Therapy Frequency 2x/week   Predicted Duration Up to 10 weeks, though shorter course likely   Certification date from 11/12/24   Certification date to 01/20/25   Progress Note Due Date   (10th visit)   Goals   OT Goals 1;2;3;4;5;6   OT Goal 1   Goal Identifier Education   Goal Description Pt will IND verbalize s/s lymphedema, infection, components CDT, importance of skin care to prevent infection, and when to seek medical attention due to exacerbation of symptoms.   Rationale In order to maximize safety and independence with performance of self-care activities   Target Date 01/20/25   OT Goal 2   Goal Identifier GCB   Goal Description Pt or caregiver will IND don/doff GCB and pt will tolerate GCB for 24 hrs to promote lymph flow and volume reduction for improved leg profile, functional mobility, and ADL/IADL IND.   Rationale In order to maximize safety and independence with ADL/IADLs   Target Date 01/20/25   OT Goal 3   Goal Identifier Home Program   Goal Description Pt will demonstrate IND completion of self-MLD and exercises to promote lymph mobility.   Rationale In order to maximize safety and independence with ADL/IADLs   Target Date 01/20/25   OT Goal 4  "  Goal Identifier Garments   Goal Description Pt or caregiver will IND don/doff compression garments to promote/maintain reductions in limb volume for improved clothing fit, functional mobility, and ADL/IADL performance.   Rationale In order to maximize safety and independence with ADL/IADLs   Target Date 01/20/25   OT Goal 5   Goal Identifier Volume   Goal Description BLE volume will reduce by 500 ml for improved skin integrity, clothing fit, functional mobiliity, and ADL/IADL engagement.   Rationale In order to maximize safety and independence with ADL/IADLs   Target Date 01/20/25   OT Goal 6   Goal Identifier LLIS / Pain   Goal Description LLIS / Pain score will reduce by 7 points to demonstrate reduced lymphedema burden on ADL/IADL performance.   Rationale In order to maximize safety and independence with ADL/IADLs   Subjective Report   Subjective Report See evaluation. Pt verbalizes understanding of all education, reports feeling more comfortable following MLD   Manual Therapy   Manual Therapy Minutes (13505) 40   Manual Therapy 1 Pt familiar w/ lymphedema and general tx from prior tx ~15 months ago. Pt agreeable to \"tune up\" tx due to recent increase in distal swelling. OT educated pt in basic pathophysiology of lymphedema and skin changes. Educated pt in components of CDT. Pt demontstrating good understanding of his general condition and lymphedema history. Pt wearing compression stockings, has new velcro wraps, but has not incorporated into home program. OT encouraged pt to bring wraps to next appt to check fit and further discuss wear schedule. OT educated pt in skin care, including washing daily w/ gentle soap, using unscented/dyed lotion to moisturize, covering blisters and areas of weeping w/ wicking/locking dresssing, avoiding tape on fragile skin. Educated pt in role of exercise in promoting lymph flow, encouraged pt to continue his HEP of walking. OT performed MLD while pt supine: neck seq, abdo seq " w/ deep breathing, cleared trav ing and trav axillary LN, established ing > axillary pathway, performed skin tech on BLE focused on moving lymph from affected distal BLE to unaffected proximal BLE/trunk. Extra time performing fibrosis tech on medial, proximal ankles. Discussed adding self-MLD to home program, pt requesting review of technique. OT educated pt in use of [employer] lymphedema pump to supplement self-MLD, pt expressing interest in pursuing application. OT to submit paperwork to [employer].   Skilled Intervention education, MLD   Patient Response/Progress Pt verbalized understanding of all education, softening noted following MLD   Eval/Assessments   OT Eval, Low Complexity Minutes (82214) 25   Education   Learner/Method Patient;Listening;Reading;Demonstration;No Barriers to Learning   Education Comments POC, components CDT, use of compression garments/wear schedule, s/s worsening lymphedema and infection   Plan   Home program stockings, velcro garments, walking, skin cares   Updates to plan of care Plan to submit information to [employer] for Entre   Plan for next session MLD, intro self MLD, check velcro fit   Total Session Time   Timed Code Treatment Minutes 40   Total Treatment Time (sum of timed and untimed services) 65

## 2024-11-12 NOTE — TELEPHONE ENCOUNTER
Spoke with pt about provider's recommendations and scheduled to see a provider today. Pt verbalized understanding and has no further questions or concerns.   Next 5 appointments (look out 90 days)      Nov 12, 2024 3:00 PM  (Arrive by 2:40 PM)  Provider Visit with Alexia Holland PA-C  [facility] [address], [address]  [facility] [address]  [phone]         Rinku Mcconnell RN  [facility]

## 2024-11-12 NOTE — PROGRESS NOTES
OCCUPATIONAL THERAPY EVALUATION  Type of Visit: Evaluation     Fall Risk Screen:  Fall screen completed by: OT  Have you fallen 2 or more times in the past year?: No  Have you fallen and had an injury in the past year?: No  Is patient a fall risk?: Yes; Department fall risk interventions implemented  Fall screen comments: Pt walks w/ SPC, is currently working w/ PT for balance/neuro.    Subjective        Presenting condition or subjective complaint: (Patient-Rptd) Swelling of feet and legs causes difficulty walking  Date of onset: 07/05/23 (Chronic for years. First evaluated in clinic in 2023)    Relevant medical history: (Patient-Rptd) Bladder or bowel problems; Change in skin color; Cold or hot arm or leg; COPD   Dates & types of surgery:      Prior diagnostic imaging/testing results:       Prior therapy history for the same diagnosis, illness or injury: (Patient-Rptd) Yes      Prior Level of Function  Transfers: Assistive equipment  Ambulation: Assistive equipment  ADL: Assistive equipment  IADL: Driving, Finances, Medication management, Work/volunteering    Living Environment  Social support:     Type of home: (Patient-Rptd) Apartment/condo   Stairs to enter the home: (Patient-Rptd) No       Ramp: (Patient-Rptd) No   Stairs inside the home: (Patient-Rptd) No       Help at home: (Patient-Rptd) Self Cares (home health aide/personal care attendant, family, etc)  Equipment owned: (Patient-Rptd) Straight Cane     Employment: (Patient-Rptd) No  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  Hobbies/Interests: (Patient-Rptd) zoom meetings - walking with pet - writing.    Patient goals for therapy:      Pain assessment: Pain denied     Objective       EDEMA EVALUATION  Additional history:  Body part affected by edema: (Patient-Rptd) feet and legs  If cancer related, treatment:    If not cancer  related, problems with veins or cause of swelling: (Patient-Rptd) Yes  Distance able to walk: (Patient-Rptd) about 2 blocks  Time able to stand: (Patient-Rptd) 15 minutes  Sensation problems in hands/feet: (Patient-Rptd) Yes (Patient-Rptd) some cold and numbness in feet  Edema etiology:   Chronic Venous Insufficiency, DVT, cardiac background; reduced walking/activity . Per chart: 1 episode of right lower extremity DVT and PE developed after knee replacement in , bilateral lower extremity varicose veins s/p multiple ablations in the left leg and also 1 ablation in the right leg     FUNCTIONAL SCALES   Lymphedema Life Impact Scale (LLIS): (Patient-Rptd) 17    Cognitive Status Examination  Orientation: Oriented to person, place and time   Level of Consciousness: Alert  Follows Commands and Answers Questions: 75% of the time - anticipate partially related to difficulty hearing OT  Personal Safety and Judgement: Intact  Memory: Intact    EDEMA  Skin Condition: Skin intact, well moisturized. Telagiectasia throughout with varicose veins by knees, hemosiderin staining distally w/ pale skin over fibrotic areas, thickened discolored toenails.  Palpation/Pitting: Puffy 1-2+ in feet L > R, non-pitting around ankles, firm, putty-like fibrosis on medial proximal ankles, non-pitting edema to knees, trace edema proximally  Scar: Yes  Location: R knee 2/2 TKA  Capillary Refill:  Not assessed  Dorsal Pedal Pulse:  Not palpated, no s/s ischemia  Stemmer Sign: -  Ulceration: No    GIRTH MEASUREMENTS: Refer to separate girth measurement flowsheet for specific measurements.    VOLUME LE  Right LE Total Volume (mL): 4976.19  Left LE Total Volume (mL): 5777.19  LE Limb Comparison:  Identify AFFECTED Limb Volume-Vi (ml): 5777.2  Identify UNAFFECTED Limb Volume-Vu (ml): 4976.2  % LE Swellin.9  LE Limb % Difference: 16.1  Note - Both LE are affected by lymphedema and fibrosis. LLE is larger than RLE, pt reports this is baseline  difference.    RANGE OF MOTION: LE ROM WFL  UE ROM WFL - Pt with some difficulty donning socks, but able to complete task reaching to floor.  STRENGTH: UE Strength WFL  POSTURE: Standing Posture: Rounded shoulders, Forward head  ACTIVITIES OF DAILY LIVING: Mod I using cane  BED MOBILITY: Independent  TRANSFERS:  Mod I using cane, benefits from elevated surface  GAIT/LOCOMOTION: Mod I using cane  BALANCE:  No LOB observed. Pt reports balance issues, working w/ OP PT  SENSATION:  Pt reporting neuropathy-like symptom in BLE  VASCULAR:  See chart. Pt working w/ Dr Pardo.  COORDINATION: WFL  MUSCLE TONE: WNL    Assessment & Plan   CLINICAL IMPRESSIONS  Medical Diagnosis: Lymphedema    Treatment Diagnosis: Lymphedema    Impression/Assessment: Pt is a 84 year old male presenting to Occupational Therapy due to slowing increasing BLE lymphedema that is not adequately maintained using current home program.  The following significant findings have been identified: Impaired activity tolerance and Impaired mobility.  These identified deficits interfere with their ability to perform self care tasks, household chores, household mobility, and community mobility as compared to previous level of function. Pt will benefit from short course of lymphedema therapy to reinforced education in components of complete decongestive therapy and refine    Clinical Decision Making (Complexity):  Assessment of Occupational Performance: 1-3 Performance Deficits  Occupational Performance Limitations: dressing, functional mobility, and health management and maintenance  Clinical Decision Making (Complexity): Low complexity    PLAN OF CARE  Treatment Interventions:  Interventions: Self-Care/Home Management, Manual Therapy    Long Term Goals   OT Goal 1  Goal Identifier: Education  Goal Description: Pt will IND verbalize s/s lymphedema, infection, components CDT, importance of skin care to prevent infection, and when to seek medical attention due  to exacerbation of symptoms.  Rationale: In order to maximize safety and independence with performance of self-care activities  Target Date: 01/20/25  OT Goal 2  Goal Identifier: GCB  Goal Description: Pt or caregiver will IND don/doff GCB and pt will tolerate GCB for 24 hrs to promote lymph flow and volume reduction for improved leg profile, functional mobility, and ADL/IADL IND.  Rationale: In order to maximize safety and independence with ADL/IADLs  Target Date: 01/20/25  OT Goal 3  Goal Identifier: Home Program  Goal Description: Pt will demonstrate IND completion of self-MLD and exercises to promote lymph mobility.  Rationale: In order to maximize safety and independence with ADL/IADLs  Target Date: 01/20/25  OT Goal 4  Goal Identifier: Garments  Goal Description: Pt or caregiver will IND don/doff compression garments to promote/maintain reductions in limb volume for improved clothing fit, functional mobility, and ADL/IADL performance.  Rationale: In order to maximize safety and independence with ADL/IADLs  Target Date: 01/20/25  OT Goal 5  Goal Identifier: Volume  Goal Description: BLE volume will reduce by 500 ml for improved skin integrity, clothing fit, functional mobiliity, and ADL/IADL engagement.  Rationale: In order to maximize safety and independence with ADL/IADLs  Target Date: 01/20/25  OT Goal 6  Goal Identifier: LLIS / Pain  Goal Description: LLIS / Pain score will reduce by 7 points to demonstrate reduced lymphedema burden on ADL/IADL performance.  Rationale: In order to maximize safety and independence with ADL/IADLs      Frequency of Treatment: 2x/week  Duration of Treatment: Up to 10 weeks, though shorter course likely     Recommended Referrals to Other Professionals:  Pt is currently working w/ OP PT for neuro/balance  Education Assessment: Learner/Method: Patient;Listening;Reading;Demonstration;No Barriers to Learning  Education Comments: POC, components CDT, use of compression garments/wear  schedule, s/s worsening lymphedema and infection     Risks and benefits of evaluation/treatment have been explained.   Patient/Family/caregiver agrees with Plan of Care.     Evaluation Time:    OT Eval, Low Complexity Minutes (88887): 25    Signing Clinician: AGUSTÍN Braswell [facility]                                                                                   OUTPATIENT OCCUPATIONAL THERAPY      PLAN OF TREATMENT FOR OUTPATIENT REHABILITATION   Patient's Last Name, First Name, Erich Monet YOB: 1940   Provider's Name   [facility]   Medical Record No.  [id number]     Onset Date: 07/05/23 (Chronic for years. First evaluated in clinic in 2023) Start of Care Date: 11/12/24     Medical Diagnosis:  Lymphedema      OT Treatment Diagnosis:  Lymphedema Plan of Treatment  Frequency/Duration:2x/week/Up to 10 weeks, though shorter course likely    Certification date from 11/12/24   To 01/20/25        See note for plan of treatment details and functional goals     AGUSTÍN Braswell                         I CERTIFY THE NEED FOR THESE SERVICES FURNISHED UNDER        THIS PLAN OF TREATMENT AND WHILE UNDER MY CARE     (Physician attestation of this document indicates review and certification of the therapy plan).              Referring Provider:  Monty Pardo    Initial Assessment  See Epic Evaluation- 11/12/24

## 2024-11-13 RX ORDER — ALBUTEROL SULFATE 90 UG/1
2 INHALANT RESPIRATORY (INHALATION) EVERY 6 HOURS PRN
Qty: 18 G | Refills: 1 | Status: SHIPPED | OUTPATIENT
Start: 2024-11-13

## 2024-11-13 RX ORDER — FUROSEMIDE 40 MG/1
20 TABLET ORAL 2 TIMES DAILY
Qty: 90 TABLET | Refills: 2 | Status: SHIPPED | OUTPATIENT
Start: 2024-11-13

## 2024-11-13 RX ORDER — POTASSIUM CHLORIDE 750 MG/1
10 TABLET, EXTENDED RELEASE ORAL 2 TIMES DAILY
Qty: 180 TABLET | Refills: 1 | Status: SHIPPED | OUTPATIENT
Start: 2024-11-13

## 2024-11-15 ENCOUNTER — TELEPHONE (OUTPATIENT)
Dept: NEUROSURGERY | Facility: CLINIC | Age: 84
End: 2024-11-15
Payer: MEDICARE

## 2024-11-15 NOTE — TELEPHONE ENCOUNTER
Left Voicemail (1st Attempt) for the patient to call back and schedule the following:    Appointment type: New Tumor  Provider: Any-See tumor tab  Return date: First available  Specialty phone number: [phone]  Additional appointment(s) needed: na  Additonal Notes: schedule with any provider who sees non skull base meningioma, next available AFTER brain MRI completed (scheduled for 12/17/2024). In person. Thanks!

## 2024-11-18 ENCOUNTER — VIRTUAL VISIT (OUTPATIENT)
Dept: NEPHROLOGY | Facility: CLINIC | Age: 84
End: 2024-11-18
Payer: MEDICARE

## 2024-11-18 DIAGNOSIS — N18.2 CHRONIC RENAL DISEASE, STAGE II: Primary | ICD-10-CM

## 2024-11-18 NOTE — NURSING NOTE
Current patient location: Patient declined to provide     Is the patient currently in the state of [place]? YES    Visit mode:VIDEO    If the visit is dropped, the patient can be reconnected by:VIDEO VISIT: Send to e-mail at: [email or web address]    Will anyone else be joining the visit? NO  (If patient encounters technical issues they should call [phone]    Are changes needed to the allergy or medication list? No    Are refills needed on medications prescribed by this physician? NO    Rooming Documentation:  Not applicable    Reason for visit: RECHECK    Zully IRAHETA

## 2024-11-18 NOTE — LETTER
11/18/2024       RE: Erich Craven  [address]  [place] [address]     Dear Colleague,    Thank you for referring your patient, Erich Craven, to the [facility] [place] at [facility]. Please see a copy of my visit note below.    Virtual Visit Details    Type of service:  Video Visit   Video Start Time:  0930  Video End Time: 0940    Originating Location (pt. Location): Home    Distant Location (provider location):  On-site  Platform used for Video Visit: [facility]      Nephrology Clinic Visit 11/18/24    Assessment and Plan:    1. 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 Peak creat 1.3, now back to baseline   - Baseline creat ~ 1.1 w/DONA   - Etiology of his CKD likely HTN/DONA   - Renal US normal   - Does not use NSAIDs   - Blood pressures controlled   - DM well controlled with A1c of 5.7 % ( 10/24)   - He is on statin for CV risk reduction    2. CV/HTN/Pafib, Diastolic HF/PPM - Currently well controlled w/decreased edema per patient report. Undergoing lymphedema therapy to assist with edema control. Diuretics have been adjusted since hospital discharge. No dyspnea. Home b/ps 130 - 140's/ but clinic b/p 11/12/24 was 124/71. Weight stable at 266 # from 248 #, 262#. Was 292# in 6/21. Has not brought in his b/p device to be checked for accuracy. Albumin 4.1.   Echo 10/24 showing EF 60%, IVC normal  Meds have been adjusted following hospital admission  Current regimen:    Lasix 20 mg bid   Lisinopril 20 mg every day ( patient reports taking prn for elevated b/p)   Diltiazem  mg every day   Metolazone 2.5 mg qod    - Per Cards    3. Electrolytes - Mild hypokalemia with K 3.0 Na 140    - KCL increased by PCP to 20 meq AM and 10 meq evening on 11/12/24    4. Acid base - Bicarb up to 34 on 11/11/24    - Suspect he is still on the dry  side    5. BMD - Ca 10.0 Phos 3.1 albumin 4.1   Vit D 38, PTH 70 ( 11/24)   No need for Ca/Vit D.     6. BPH - Controlled with Flomax    7. Anemia - Hgb 12.2   - Iron studies 12/23: Ferritin  Fe 98 IS 32   - On iron tab every other day.    - Does not meet LEIGH criteria     8. Disposition - RTC 6 months for follow up w/labs prior    Assessment and plan was discussed with patient and he voiced his understanding and agreement.    Reason for Visit:  CKD2/HTN    HPI:  Mr Craven is an 85 yo male with CKD2, COPD, RIN on BiPAP, Diastolic HF, SSS s/p PPM, CAD, P-Afib, HTN, BPH, h/o Pulmonary embolism, present today for CKD follow up.   Last seen in clinic by me on 5/24/24  Baseline creat has been < 1.1 since 7/22 w/ DONA    Interval Hx;    Hospital admission 10/18-10/20/24 for MARTÍNEZ, dizziness, near syncope. Imdur, Lisinopril, Metolazone and Spironolactone held at discharge). Creat 1.3 upon admission.     ROS:   Feeling well w/o complaint  Has recovered from hospital admission in October for hypotension. B/ps more in the 130-140/ range at home.   Wearing compression wraps/lymphedema treatment  Walking Arnold daily  Remains on CPAP  No CP/dyspnea  No abdominal concerns  Voiding w/o complication. On Flomax  Appetite is too good. Has been a vegetarian for years   TSH quite uncontrolled with TSH 81.7 ( 10/30), now down to 34.6 ( ran out of his Levothyroxine for awhile)  Notes several UTIs this summer    Chronic Health Problems:    CKD2  COPD  RIN on CPAP  Diastolic HF  SSS s/p PPM  CAD  P-Afib  h/o Pulmonary embolism  HTN  Hypothyroidism  Anxiety  BPH  HLD    Family Hx:   Family History   Problem Relation Age of Onset     Brain Cancer Mother      Other Cancer Mother      Anxiety Disorder Mother      Cerebrovascular Disease Father      Hypertension Father      Obesity Father      Substance Abuse Son      Osteoporosis Sister      Thyroid Disease Sister      Anesthesia Reaction No family hx of      Thrombosis No family hx of       Personal Hx:   , lives with his dog Clayton. Lives in [facility] living apt connected to the [facility] . Has services and meals. He continues to set up his own medications. NS    Allergies:  No Known Allergies      Medications:  Current Outpatient Medications   Medication Sig Dispense Refill     acetaminophen (TYLENOL) 500 MG tablet Take 1,000 mg by mouth 3 times daily as needed for mild pain.       albuterol (PROAIR HFA/PROVENTIL HFA/VENTOLIN HFA) 108 (90 Base) MCG/ACT inhaler Inhale 2 puffs into the lungs every 6 hours as needed for shortness of breath, wheezing or cough. 18 g 1     aspirin (ASA) 81 MG chewable tablet Take 1 tablet by mouth daily       atorvastatin (LIPITOR) 80 MG tablet Take 1 tablet (80 mg) by mouth every evening 90 tablet 4     diltiazem ER COATED BEADS (CARDIZEM CD/CARTIA XT) 300 MG 24 hr capsule Take 1 capsule (300 mg) by mouth daily 90 capsule 3     ferrous sulfate 45 MG TBCR CR tablet Take 1 tablet (45 mg) by mouth every other day. 45 tablet 0     Fluticasone-Umeclidin-Vilanterol (TRELEGY ELLIPTA) 200-62.5-25 MCG/ACT oral inhaler Inhale 1 puff into the lungs daily. 60 each 0     furosemide (LASIX) 40 MG tablet Take 0.5 tablets (20 mg) by mouth 2 times daily. 90 tablet 2     levothyroxine (SYNTHROID/LEVOTHROID) 175 MCG tablet Take 1 tablet (175 mcg) by mouth six times a week. 2 tablet (350 mcg) on Fridays 102 tablet 1     metolazone (ZAROXOLYN) 2.5 MG tablet Take 1 tablet (2.5 mg) by mouth every other day. Taken 30 minutes prior to furosemide 45 tablet 0     Multiple vitamin TABS Take 1 tablet by mouth daily       polyethylene glycol (MIRALAX) 17 g packet Take 17 g by mouth daily 7 packet 0     potassium chloride cammy ER (KLOR-CON M10) 10 MEQ CR tablet Take 1 tablet (10 mEq) by mouth 2 times daily. 180 tablet 1     rivaroxaban ANTICOAGULANT (XARELTO ANTICOAGULANT) 20 MG TABS tablet Take 1 tablet (20 mg) by mouth daily (with dinner). 90 tablet 3     tamsulosin  (FLOMAX) 0.4 MG capsule Take 1 capsule (0.4 mg) by mouth daily 90 capsule 0     venlafaxine (EFFEXOR) 75 MG tablet TAKE 1 TABLET(75 MG) BY MOUTH TWICE DAILY 180 tablet 0     No current facility-administered medications for this visit.      Vitals:  No vitals for this visit    Exam:  GEN: Pleasant male in NAD  LUNGS: Breathing is non labored.    NEURO: A/O  PSYCH: Bright affect, engaging     LABS:   CMP  Recent Labs   Lab Test 11/11/24  0837 10/30/24  1549 10/19/24  0647 10/19/24  0212 10/18/24  1248 08/31/21  1401 06/21/21  1454 06/14/21  1455 04/21/21  0931 03/02/21  1405    137 136  --  135   < > 138 137 138 136   POTASSIUM 3.0* 3.5 3.7  --  4.1   < > 4.1 4.7 3.7 4.5   CHLORIDE 95* 95* 101  --  94*   < > 103 105 99 104   CO2 34* 31* 27  --  30*   < > 29 28 39* 28   ANIONGAP 11 11 8  --  11   < > 6 4 <1* 4   * 88 98 94 84   < > 100* 108* 104* 93   BUN 17.0 18.1 20.6  --  27.6*   < > 64* 62* 44* 57*   CR 1.04 1.20* 1.16  --  1.39*   < > 1.57* 1.81* 1.02 1.42*   GFRESTIMATED 70 59* 62  --  50*   < > 40* 34* 68 46*   GFRESTBLACK  --   --   --   --   --   --  47* 39* 79 53*   LAWSON 10.0 9.8 9.3  --  9.5   < > 9.4 9.2 9.2 9.6    < > = values in this interval not displayed.     Recent Labs   Lab Test 10/30/24  1549 10/18/24  1248 07/19/24  0919 04/22/24  2051   BILITOTAL 0.4 0.3 0.5 0.5   ALKPHOS 119 125 108 115   ALT 28 26 15 20   AST 30 31 22 29     CBC  Recent Labs   Lab Test 11/11/24  0837 10/30/24  1549 10/19/24  0647 10/18/24  1248 09/23/24  1246   HGB 12.2* 12.7* 12.2* 12.4* 13.2*   WBC  --  9.7 8.5 9.5 9.5   RBC  --  3.99* 3.76* 3.88* 3.98*   HCT  --  39.3* 37.0* 37.8* 39.7*   MCV  --  99 98 97 100   MCH  --  31.8 32.4 32.0 33.2*   MCHC  --  32.3 33.0 32.8 33.2   RDW  --  15.2* 14.3 14.7 14.6   PLT  --  196 158 186 200     URINE STUDIES  Recent Labs   Lab Test 09/17/24  1040 09/01/24  1402 07/31/24  1332 07/29/24  1835 07/15/22  0046 03/25/22  1855   COLOR Yellow Yellow Yellow Light Yellow   < > Yellow    APPEARANCE Clear Clear Clear Slightly Cloudy*   < > Clear   URINEGLC Negative Negative Negative Negative   < > Negative   URINEBILI Negative Negative Negative Negative   < > Negative   URINEKETONE Negative Negative Negative Negative   < > Negative   SG 1.015 1.020 1.015 1.014   < > 1.015   UBLD Negative Small* Negative Negative   < > Negative   URINEPH 7.0 7.0 8.0* 7.5*   < > 5.5   PROTEIN Negative 30* Negative 10*   < > Negative   UROBILINOGEN 0.2 0.2 0.2  --   --  0.2   NITRITE Negative Negative Negative Negative   < > Negative   LEUKEST Negative Moderate* Small* Large*   < > Negative   RBCU 0-2 25-50* 2-5* 2   < >  --    WBCU 0-5 >100* 25-50* >182*   < >  --     < > = values in this interval not displayed.     Recent Labs   Lab Test 05/23/22  1356 02/04/22  0902 10/28/21  1118 06/14/21  1516   UTPG 0.18 0.29* 0.44* 0.38*     PTH  Recent Labs   Lab Test 11/11/24  0837 12/15/23  0822 05/23/22  1356   PTHI 70* 64 92*     IRON STUDIES  Recent Labs   Lab Test 10/30/24  1549 04/10/24  1617 01/12/24  1155 12/15/23  0822 10/04/23  1358 05/03/23  0809   IRON  --   --   --  98 68 30*   FEB  --   --   --  311 326 368   IRONSAT  --   --   --  32 21 8*   SEGUNDO 74 58 80  --  44 43       Tiffany Suero NP      Again, thank you for allowing me to participate in the care of your patient.      Sincerely,    Tiffany Suero NP

## 2024-11-18 NOTE — PROGRESS NOTES
Virtual Visit Details    Type of service:  Video Visit   Video Start Time:  0930  Video End Time: 0940    Originating Location (pt. Location): Home    Distant Location (provider location):  On-site  Platform used for Video Visit: [employer]

## 2024-11-19 ENCOUNTER — TELEPHONE (OUTPATIENT)
Dept: NEUROSURGERY | Facility: CLINIC | Age: 84
End: 2024-11-19

## 2024-11-19 ENCOUNTER — THERAPY VISIT (OUTPATIENT)
Dept: OCCUPATIONAL THERAPY | Facility: CLINIC | Age: 84
End: 2024-11-19
Payer: COMMERCIAL

## 2024-11-19 DIAGNOSIS — I89.0 LYMPHEDEMA: Primary | ICD-10-CM

## 2024-11-19 PROCEDURE — 97140 MANUAL THERAPY 1/> REGIONS: CPT | Mod: GO | Performed by: OCCUPATIONAL THERAPIST

## 2024-11-19 NOTE — PROGRESS NOTES
Nephrology Clinic Visit 11/18/24    Assessment and Plan:    1. 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 Peak creat 1.3, now back to baseline   - Baseline creat ~ 1.1 w/DONA   - Etiology of his CKD likely HTN/DONA   - Renal US normal   - Does not use NSAIDs   - Blood pressures controlled   - DM well controlled with A1c of 5.7 % ( 10/24)   - He is on statin for CV risk reduction    2. CV/HTN/Pafib, Diastolic HF/PPM - Currently well controlled w/decreased edema per patient report. Undergoing lymphedema therapy to assist with edema control. Diuretics have been adjusted since hospital discharge. No dyspnea. Home b/ps 130 - 140's/ but clinic b/p 11/12/24 was 124/71. Weight stable at 266 # from 248 #, 262#. Was 292# in 6/21. Has not brought in his b/p device to be checked for accuracy. Albumin 4.1.   Echo 10/24 showing EF 60%, IVC normal  Meds have been adjusted following hospital admission  Current regimen:    Lasix 20 mg bid   Lisinopril 20 mg every day ( patient reports taking prn for elevated b/p)   Diltiazem  mg every day   Metolazone 2.5 mg qod    - Per Cards    3. Electrolytes - Mild hypokalemia with K 3.0 Na 140    - KCL increased by PCP to 20 meq AM and 10 meq evening on 11/12/24    4. Acid base - Bicarb up to 34 on 11/11/24    - Suspect he is still on the dry side    5. BMD - Ca 10.0 Phos 3.1 albumin 4.1   Vit D 38, PTH 70 ( 11/24)   No need for Ca/Vit D.     6. BPH - Controlled with Flomax    7. Anemia - Hgb 12.2   - Iron studies 12/23: Ferritin  Fe 98 IS 32   - On iron tab every other day.    - Does not meet LEIGH criteria     8. Disposition - RTC 6 months for follow up w/labs prior    Assessment and plan was discussed with patient and he voiced his understanding and agreement.    Reason for Visit:  CKD2/HTN    HPI:  Mr Craven is an 85 yo male with CKD2, COPD, RIN on BiPAP, Diastolic HF, SSS s/p PPM, CAD, P-Afib, HTN,  BPH, h/o Pulmonary embolism, present today for CKD follow up.   Last seen in clinic by me on 5/24/24  Baseline creat has been < 1.1 since 7/22 w/ DONA    Interval Hx;    Hospital admission 10/18-10/20/24 for MARTÍNEZ, dizziness, near syncope. Imdur, Lisinopril, Metolazone and Spironolactone held at discharge). Creat 1.3 upon admission.     ROS:   Feeling well w/o complaint  Has recovered from hospital admission in October for hypotension. B/ps more in the 130-140/ range at home.   Wearing compression wraps/lymphedema treatment  Walking Arnold daily  Remains on CPAP  No CP/dyspnea  No abdominal concerns  Voiding w/o complication. On Flomax  Appetite is too good. Has been a vegetarian for years   TSH quite uncontrolled with TSH 81.7 ( 10/30), now down to 34.6 ( ran out of his Levothyroxine for awhile)  Notes several UTIs this summer    Chronic Health Problems:    CKD2  COPD  RIN on CPAP  Diastolic HF  SSS s/p PPM  CAD  P-Afib  h/o Pulmonary embolism  HTN  Hypothyroidism  Anxiety  BPH  HLD    Family Hx:   Family History   Problem Relation Age of Onset    Brain Cancer Mother     Other Cancer Mother     Anxiety Disorder Mother     Cerebrovascular Disease Father     Hypertension Father     Obesity Father     Substance Abuse Son     Osteoporosis Sister     Thyroid Disease Sister     Anesthesia Reaction No family hx of     Thrombosis No family hx of      Personal Hx:   , lives with his dog Clayton. Lives in [facility] living apt connected to the [facility] ( [facility]) . Has services and meals. He continues to set up his own medications. NS    Allergies:  No Known Allergies      Medications:  Current Outpatient Medications   Medication Sig Dispense Refill    acetaminophen (TYLENOL) 500 MG tablet Take 1,000 mg by mouth 3 times daily as needed for mild pain.      albuterol (PROAIR HFA/PROVENTIL HFA/VENTOLIN HFA) 108 (90 Base) MCG/ACT inhaler Inhale 2 puffs into the lungs every 6 hours as needed for shortness of breath,  wheezing or cough. 18 g 1    aspirin (ASA) 81 MG chewable tablet Take 1 tablet by mouth daily      atorvastatin (LIPITOR) 80 MG tablet Take 1 tablet (80 mg) by mouth every evening 90 tablet 4    diltiazem ER COATED BEADS (CARDIZEM CD/CARTIA XT) 300 MG 24 hr capsule Take 1 capsule (300 mg) by mouth daily 90 capsule 3    ferrous sulfate 45 MG TBCR CR tablet Take 1 tablet (45 mg) by mouth every other day. 45 tablet 0    Fluticasone-Umeclidin-Vilanterol (TRELEGY ELLIPTA) 200-62.5-25 MCG/ACT oral inhaler Inhale 1 puff into the lungs daily. 60 each 0    furosemide (LASIX) 40 MG tablet Take 0.5 tablets (20 mg) by mouth 2 times daily. 90 tablet 2    levothyroxine (SYNTHROID/LEVOTHROID) 175 MCG tablet Take 1 tablet (175 mcg) by mouth six times a week. 2 tablet (350 mcg) on Fridays 102 tablet 1    metolazone (ZAROXOLYN) 2.5 MG tablet Take 1 tablet (2.5 mg) by mouth every other day. Taken 30 minutes prior to furosemide 45 tablet 0    Multiple vitamin TABS Take 1 tablet by mouth daily      polyethylene glycol (MIRALAX) 17 g packet Take 17 g by mouth daily 7 packet 0    potassium chloride cammy ER (KLOR-CON M10) 10 MEQ CR tablet Take 1 tablet (10 mEq) by mouth 2 times daily. 180 tablet 1    rivaroxaban ANTICOAGULANT (XARELTO ANTICOAGULANT) 20 MG TABS tablet Take 1 tablet (20 mg) by mouth daily (with dinner). 90 tablet 3    tamsulosin (FLOMAX) 0.4 MG capsule Take 1 capsule (0.4 mg) by mouth daily 90 capsule 0    venlafaxine (EFFEXOR) 75 MG tablet TAKE 1 TABLET(75 MG) BY MOUTH TWICE DAILY 180 tablet 0     No current facility-administered medications for this visit.      Vitals:  No vitals for this visit    Exam:  GEN: Pleasant male in NAD  LUNGS: Breathing is non labored.    NEURO: A/O  PSYCH: Bright affect, engaging     LABS:   CMP  Recent Labs   Lab Test 11/11/24  0837 10/30/24  1549 10/19/24  0647 10/19/24  0212 10/18/24  1248 08/31/21  1401 06/21/21  1454 06/14/21  1455 04/21/21  0931 03/02/21  1405    137 136  --  135    < > 138 137 138 136   POTASSIUM 3.0* 3.5 3.7  --  4.1   < > 4.1 4.7 3.7 4.5   CHLORIDE 95* 95* 101  --  94*   < > 103 105 99 104   CO2 34* 31* 27  --  30*   < > 29 28 39* 28   ANIONGAP 11 11 8  --  11   < > 6 4 <1* 4   * 88 98 94 84   < > 100* 108* 104* 93   BUN 17.0 18.1 20.6  --  27.6*   < > 64* 62* 44* 57*   CR 1.04 1.20* 1.16  --  1.39*   < > 1.57* 1.81* 1.02 1.42*   GFRESTIMATED 70 59* 62  --  50*   < > 40* 34* 68 46*   GFRESTBLACK  --   --   --   --   --   --  47* 39* 79 53*   LAWSON 10.0 9.8 9.3  --  9.5   < > 9.4 9.2 9.2 9.6    < > = values in this interval not displayed.     Recent Labs   Lab Test 10/30/24  1549 10/18/24  1248 07/19/24  0919 04/22/24  2051   BILITOTAL 0.4 0.3 0.5 0.5   ALKPHOS 119 125 108 115   ALT 28 26 15 20   AST 30 31 22 29     CBC  Recent Labs   Lab Test 11/11/24  0837 10/30/24  1549 10/19/24  0647 10/18/24  1248 09/23/24  1246   HGB 12.2* 12.7* 12.2* 12.4* 13.2*   WBC  --  9.7 8.5 9.5 9.5   RBC  --  3.99* 3.76* 3.88* 3.98*   HCT  --  39.3* 37.0* 37.8* 39.7*   MCV  --  99 98 97 100   MCH  --  31.8 32.4 32.0 33.2*   MCHC  --  32.3 33.0 32.8 33.2   RDW  --  15.2* 14.3 14.7 14.6   PLT  --  196 158 186 200     URINE STUDIES  Recent Labs   Lab Test 09/17/24  1040 09/01/24  1402 07/31/24  1332 07/29/24  1835 07/15/22  0046 03/25/22  1855   COLOR Yellow Yellow Yellow Light Yellow   < > Yellow   APPEARANCE Clear Clear Clear Slightly Cloudy*   < > Clear   URINEGLC Negative Negative Negative Negative   < > Negative   URINEBILI Negative Negative Negative Negative   < > Negative   URINEKETONE Negative Negative Negative Negative   < > Negative   SG 1.015 1.020 1.015 1.014   < > 1.015   UBLD Negative Small* Negative Negative   < > Negative   URINEPH 7.0 7.0 8.0* 7.5*   < > 5.5   PROTEIN Negative 30* Negative 10*   < > Negative   UROBILINOGEN 0.2 0.2 0.2  --   --  0.2   NITRITE Negative Negative Negative Negative   < > Negative   LEUKEST Negative Moderate* Small* Large*   < > Negative   RBCU 0-2  25-50* 2-5* 2   < >  --    WBCU 0-5 >100* 25-50* >182*   < >  --     < > = values in this interval not displayed.     Recent Labs   Lab Test 05/23/22  1356 02/04/22  0902 10/28/21  1118 06/14/21  1516   UTPG 0.18 0.29* 0.44* 0.38*     PTH  Recent Labs   Lab Test 11/11/24  0837 12/15/23  0822 05/23/22  1356   PTHI 70* 64 92*     IRON STUDIES  Recent Labs   Lab Test 10/30/24  1549 04/10/24  1617 01/12/24  1155 12/15/23  0822 10/04/23  1358 05/03/23  0809   IRON  --   --   --  98 68 30*   FEB  --   --   --  311 326 368   IRONSAT  --   --   --  32 21 8*   SEGUNDO 74 58 80  --  44 43       Tiffany Suero, NP

## 2024-11-19 NOTE — TELEPHONE ENCOUNTER
Left Voicemail (2nd Attempt) and sent [employer] message for the patient to call back and schedule the following:     Appointment type: New Tumor  Provider: Any-See tumor tab  Return date: First available  Specialty phone number: [phone]  Additional appointment(s) needed: na  Additonal Notes: schedule with any provider who sees non skull base meningioma, next available AFTER brain MRI completed (scheduled for 12/17/2024). In person. Thanks!

## 2024-11-20 NOTE — TELEPHONE ENCOUNTER
SPINE PATIENTS - NEW PROTOCOL PREVISIT    RECORDS RECEIVED FROM: Referred by Ivana   REASON FOR VISIT: Meningioma   PROVIDER: HEIDI   DATE OF APPT: 12/20/204   NOTES (FOR ALL VISITS) STATUS DETAILS   OFFICE NOTE from referring provider Internal Referral and notes in chart   OFFICE NOTE from other specialist N/A    DISCHARGE SUMMARY from hospital N/A    DISCHARGE REPORT from ER N/A    OPERATIVE REPORT N/A    EMG REPORT N/A    Injection N/A    Physical therapy N/A    IMAGING  (FOR ALL VISITS)     MRI (HEAD, NECK, SPINE) In process MR brain SCHEDULED 12/17/2024   XRAY (SPINE) *NEUROSURGERY* N/A    CT (HEAD, NECK, SPINE) N/A

## 2024-11-21 ENCOUNTER — VIRTUAL VISIT (OUTPATIENT)
Dept: CT IMAGING | Facility: CLINIC | Age: 84
End: 2024-11-21
Attending: STUDENT IN AN ORGANIZED HEALTH CARE EDUCATION/TRAINING PROGRAM
Payer: MEDICARE

## 2024-11-21 VITALS — WEIGHT: 262 LBS | BODY MASS INDEX: 35.49 KG/M2 | HEIGHT: 72 IN

## 2024-11-21 DIAGNOSIS — N39.0 RECURRENT UTI: ICD-10-CM

## 2024-11-21 ASSESSMENT — PAIN SCALES - GENERAL: PAINLEVEL_OUTOF10: NO PAIN (0)

## 2024-11-21 NOTE — NURSING NOTE
Current patient location: [address] [place] [address] [address]  [facility] [id number]    Is the patient currently in the state of [place]? YES    Visit mode:VIDEO    If the visit is dropped, the patient can be reconnected by:VIDEO VISIT: Text to cell phone:   Telephone Information:   Mobile [phone]    and VIDEO VISIT: Send to e-mail at: [email or web address]    Will anyone else be joining the visit? NO  (If patient encounters technical issues they should call [phone])    Are changes needed to the allergy or medication list? Pt stated no changes to allergies    Are refills needed on medications prescribed by this physician? NO    Rooming Documentation:  Unable to complete questionnaire(s) due to time    Reason for visit: Consult    Bonnie IRAHETA

## 2024-11-21 NOTE — PROGRESS NOTES
Virtual Visit Details    Type of service:  Video Visit   Video Start Time:  8:30AM  Video End Time: 8:50AM    Originating Location (pt. Location): Home    Distant Location (provider location):  Off-site  Platform used for Video Visit: AmWell  ====================================================      [facility] INFECTIOUS DISEASE  [address]  [address]  [facility] [address]  Phone: [phone]  Fax: [phone]    Patient:  Erich Craven, Date of birth 1940  Date of Visit:  11/21/2024  Referring Provider: Anne Heath  Reason for visit: Recurrent UTI      Assessment & Plan    ID Problem List:   Recurrent UTI  Hx of right epididymitis (July, 2024)  B/l LE edema  Paroxysmal Afib, SSS s/p PPM  DVT, PE on xarelto  CKD stage 3  DM, A1c 5.7% on 10/30/24    Assessment:   83 yo M who is referred to ID clinic for recurrent UTI. Hx of right epididymitis, s/p levofloxacin x10 days (July, 2024), and then again in Sept, '24 received TMP-SMX -> nitrofurantoin for 7 days. Patient was also seen by urology 10/21/24, and plan to measure post voiding residual urine, if >200 then plan for cystoscopy. There is no known hx of urinary stone, or stents. Currently asymptomatic. Unable to examine the patient, due to virtual visit but patient denies having suprapubic or CVA tenderness. No additional work up and/or antibiotic is indicated at this time. Will order standing UA w/ reflex to culture if were to become symptomatic in the future. Patient mentioned of pending imaging study (believes ultrasound) per urology, though no active order in this regard. Suggest that patient to reach out to urology vs PCP to address this. Offered ID clinic follow up in 4 months.       Recommendations:   No indication of work up and/or antibiotic at this time, considering have been asymptomatic since Sept '24.  UA microscopy w/ reflex to culture, placed standing order if were to become symptomatic.   ID clinic follow up in 4  months.  Continue to follow up with urology as planned. Reportedly patient mentions of pending ultrasound, but there is no active order in the chart. Suggested that patient follows up with urology vs PCP if any pending imaging studies.       60 minutes spent by me on the date of the encounter doing chart review, history and exam, documentation and further activities per the note.      History of Present Illness     Pertinent history obtain from: chart review and patient  Mr. Craven is presented for ID evaluation regarding recurrent UTI. Reports that at present asymptomatic. But recalls having dysuria, low grade fevers when had previous UTI episodes. Responded well to antibiotic course. Seen by urology and believes that will undergo ultrasound, but yet to schedule. Does have baseline swelling of both leg, and has wrapping.     Lives by self in an independent living facility. Has a 3 yo healthy pet (dog). Independent in ADLs, able to drive by self, and goes for daily walks.     Specialty Problems          Infectious Diseases    Pneumonia of left lung due to infectious organism, unspecified part of lung            Physical Exam   General: pleasant, not in acute distress, speaking in complete sentences, breathing comfortably on room air.   Otherwise deferred due to virtual video visit    Data   Laboratory data and imaging listed below was reviewed prior to this encounter.     Microbiology:    Culture   Date Value Ref Range Status   09/17/2024 <10,000 CFU/mL Mixture of Urogenital Ava  Final   09/01/2024 >100,000 CFU/mL Staphylococcus epidermidis (A)  Final   07/31/2024 No Growth  Final   07/29/2024 >100,000 CFU/mL Staphylococcus epidermidis (A)  Final   07/29/2024 50,000-100,000 CFU/mL Staphylococcus epidermidis (A)  Final   02/07/2024 No Growth  Final   02/07/2024 No Growth  Final   03/06/2023 10,000-50,000 CFU/mL Mixture of urogenital ava  Final     Inflammatory Markers:   Recent Labs   Lab Test 06/19/23  9449  05/03/23  0809 05/01/23  0826 03/29/22  1059 08/11/20  1532   SED 27* 66* 54*  --   --    CRP  --   --   --  35.0* 9.8*     Urine Studies:    Recent Labs   Lab Test 09/17/24  1040 09/01/24  1402 07/31/24  1332 07/29/24  1835 03/06/23  0345   LEUKEST Negative Moderate* Small* Large* Negative   WBCU 0-5 >100* 25-50* >182* 0     Hematology Studies:    Recent Labs   Lab Test 11/11/24  0837 10/30/24  1549 10/19/24  0647 10/18/24  1248 09/23/24  1246 08/06/24  1220 07/19/24  0919 06/14/21  1455 03/24/21  1805 03/04/21  1932 02/10/20  1559 02/01/20  1721 01/27/20  1548 01/22/20  1216 11/05/19  0657 09/30/19  1154   WBC  --  9.7 8.5 9.5 9.5 11.5* 9.6   < > 10.4 10.4   < > 12.6*   < > 12.0*   < > 10.5   ANEU  --   --   --   --   --   --   --   --  6.5 6.1  --  8.5*  --  8.5*  --  7.3   AEOS  --   --   --   --   --   --   --   --  0.5 0.6  --  0.3  --  0.1  --  0.2   HGB 12.2* 12.7* 12.2* 12.4* 13.2* 12.5* 11.9*   < > 11.9* 11.4*   < > 12.9*   < > 13.3   < > 14.1   MCV  --  99 98 97 100 99 98   < > 102* 103*   < > 95   < > 96   < > 97   PLT  --  196 158 186 200 187 206   < > 208 185   < > 221   < > 207   < > 196    < > = values in this interval not displayed.      Metabolic Studies:   Recent Labs   Lab Test 11/11/24  0837 10/30/24  1549 10/19/24  0647 10/18/24  1248 09/23/24  1246    137 136 135 137   POTASSIUM 3.0* 3.5 3.7 4.1 4.2   CHLORIDE 95* 95* 101 94* 98   CO2 34* 31* 27 30* 28   BUN 17.0 18.1 20.6 27.6* 25.9*   CR 1.04 1.20* 1.16 1.39* 1.09   GFRESTIMATED 70 59* 62 50* 67     Hepatic Studies:   Recent Labs   Lab Test 11/11/24  0837 10/30/24  1549 10/18/24  1248 07/19/24  0919 05/17/24  0830 04/22/24  2051 04/10/24  1617 02/14/24  0958   BILITOTAL  --  0.4 0.3 0.5  --  0.5 0.4 0.6   ALKPHOS  --  119 125 108  --  115 110 101   ALBUMIN 4.1 4.3 4.2 4.0 4.2 4.2 4.4 4.1   AST  --  30 31 22  --  29 25 25   ALT  --  28 26 15  --  20 24 21     TB Quant:   Recent Labs   Lab Test 02/27/23  0624 09/20/22  0910   TBRES  Negative Negative   TBA1 0.01 0.00   TBA2 0.00 0.00   MMNIL 0.74 1.25   NIL 0.03 0.04   QGREY 0.03 0.04   QYELLOW 0.03 0.04   QPURPLE 0.77 1.29            7/29/2024 US testicular, scrotum:  IMPRESSION:  1.  Focal hyperemic edematous area inferior to the right testicle may be due to edema and inflammation in the right epididymal tail. Findings are suggestive of acute right epididymitis. Consider a follow-up ultrasound in 1-2 months.  2.  Small bilateral hydroceles.

## 2024-11-21 NOTE — PROGRESS NOTES
Virtual Visit Details    Type of service:  Video Visit   Video Start Time:  8:30AM  Video End Time: 8:50AM    Originating Location (pt. Location): Home    Distant Location (provider location):  Off-site  Platform used for Video Visit: Quintin

## 2024-11-22 ENCOUNTER — MYC REFILL (OUTPATIENT)
Dept: FAMILY MEDICINE | Facility: CLINIC | Age: 84
End: 2024-11-22

## 2024-11-22 DIAGNOSIS — E03.9 HYPOTHYROIDISM, UNSPECIFIED TYPE: ICD-10-CM

## 2024-11-24 ENCOUNTER — MYC MEDICAL ADVICE (OUTPATIENT)
Dept: FAMILY MEDICINE | Facility: CLINIC | Age: 84
End: 2024-11-24
Payer: MEDICARE

## 2024-11-24 ENCOUNTER — MYC REFILL (OUTPATIENT)
Dept: CARDIOLOGY | Facility: CLINIC | Age: 84
End: 2024-11-24
Payer: MEDICARE

## 2024-11-24 DIAGNOSIS — E78.5 HYPERLIPIDEMIA LDL GOAL <100: ICD-10-CM

## 2024-11-25 ENCOUNTER — THERAPY VISIT (OUTPATIENT)
Dept: OCCUPATIONAL THERAPY | Facility: CLINIC | Age: 84
End: 2024-11-25
Payer: MEDICARE

## 2024-11-25 ENCOUNTER — MYC MEDICAL ADVICE (OUTPATIENT)
Dept: FAMILY MEDICINE | Facility: CLINIC | Age: 84
End: 2024-11-25

## 2024-11-25 DIAGNOSIS — I89.0 LYMPHEDEMA: Primary | ICD-10-CM

## 2024-11-25 PROCEDURE — 97140 MANUAL THERAPY 1/> REGIONS: CPT | Mod: GO | Performed by: OCCUPATIONAL THERAPIST

## 2024-11-25 RX ORDER — TAMSULOSIN HYDROCHLORIDE 0.4 MG/1
0.4 CAPSULE ORAL DAILY
Qty: 90 CAPSULE | Refills: 0 | Status: SHIPPED | OUTPATIENT
Start: 2024-11-25

## 2024-11-25 RX ORDER — ATORVASTATIN CALCIUM 80 MG/1
80 TABLET, FILM COATED ORAL EVERY EVENING
Qty: 90 TABLET | Refills: 3 | Status: SHIPPED | OUTPATIENT
Start: 2024-11-25

## 2024-11-25 NOTE — TELEPHONE ENCOUNTER
[facility] Cardiology Refill Guideline reviewed.  Medication meets criteria for refill.  Sheree Nguyen RN on 11/25/2024 at 10:18 AM

## 2024-11-25 NOTE — TELEPHONE ENCOUNTER
Patient was called in response to 11/24/24 [employer] message. Provider please review [employer] message.     Patient was at the hospital on 10/18/24 and has been taking cardiovascular medications per medlist since then. He said that at that hospital visit he was told to take a Lisinopril 20 mg if BP goes higher than 140. He had to do this about 3 times since then, but more frequently this weekend.     He takes BP in AM before medication.     11/25 /79, HR 75 -  did no take Lisinopril   11/24 sitting /84, HR 62, standing /80, HR 62 - took lisinopril   11/23 /87, 72 - took lisinopril   11/22 sitting /78, HR 64, standing 144/75, HR 75   He took lisinopril another time 4 or 5 days ago.    Standing systolic BP is 5 points less than sitting on average. Checking one after the other.     Symptoms: Mild headaches - taking Tylenol 3x/day - that helps.     FYI - pt did seen infections disease virtually on 11/21/24. Pt plans to see urology.    Pt started lymphedema treatment 2 weeks ago.

## 2024-11-26 ENCOUNTER — OFFICE VISIT (OUTPATIENT)
Dept: FAMILY MEDICINE | Facility: CLINIC | Age: 84
End: 2024-11-26
Payer: MEDICARE

## 2024-11-26 VITALS
HEART RATE: 70 BPM | RESPIRATION RATE: 22 BRPM | WEIGHT: 262.2 LBS | TEMPERATURE: 98.9 F | BODY MASS INDEX: 35.51 KG/M2 | DIASTOLIC BLOOD PRESSURE: 80 MMHG | SYSTOLIC BLOOD PRESSURE: 140 MMHG | HEIGHT: 72 IN | OXYGEN SATURATION: 93 %

## 2024-11-26 DIAGNOSIS — R39.9 LOWER URINARY TRACT SYMPTOMS (LUTS): ICD-10-CM

## 2024-11-26 DIAGNOSIS — I10 PRIMARY HYPERTENSION: ICD-10-CM

## 2024-11-26 DIAGNOSIS — J42 CHRONIC BRONCHITIS, UNSPECIFIED CHRONIC BRONCHITIS TYPE (H): ICD-10-CM

## 2024-11-26 DIAGNOSIS — R30.0 DYSURIA: Primary | ICD-10-CM

## 2024-11-26 LAB
ALBUMIN UR-MCNC: NEGATIVE MG/DL
APPEARANCE UR: CLEAR
BILIRUB UR QL STRIP: NEGATIVE
COLOR UR AUTO: YELLOW
GLUCOSE UR STRIP-MCNC: NEGATIVE MG/DL
HGB UR QL STRIP: NEGATIVE
KETONES UR STRIP-MCNC: NEGATIVE MG/DL
LEUKOCYTE ESTERASE UR QL STRIP: NEGATIVE
NITRATE UR QL: NEGATIVE
PH UR STRIP: 7 [PH] (ref 5–7)
SP GR UR STRIP: 1.01 (ref 1–1.03)
UROBILINOGEN UR STRIP-ACNC: 0.2 E.U./DL

## 2024-11-26 PROCEDURE — 81003 URINALYSIS AUTO W/O SCOPE: CPT | Performed by: PHYSICIAN ASSISTANT

## 2024-11-26 RX ORDER — NITROFURANTOIN 25; 75 MG/1; MG/1
100 CAPSULE ORAL 2 TIMES DAILY
Qty: 14 CAPSULE | Refills: 0 | Status: SHIPPED | OUTPATIENT
Start: 2024-11-26

## 2024-11-26 RX ORDER — FLUTICASONE FUROATE, UMECLIDINIUM BROMIDE AND VILANTEROL TRIFENATATE 200; 62.5; 25 UG/1; UG/1; UG/1
1 POWDER RESPIRATORY (INHALATION) DAILY
Qty: 60 EACH | Refills: 3 | Status: SHIPPED | OUTPATIENT
Start: 2024-11-26

## 2024-11-26 ASSESSMENT — PAIN SCALES - GENERAL: PAINLEVEL_OUTOF10: MODERATE PAIN (4)

## 2024-11-26 NOTE — RESULT ENCOUNTER NOTE
Results discussed directly with patient while patient was present. Any further details documented in the note.   Alexia Holland PA-C

## 2024-11-26 NOTE — CONFIDENTIAL NOTE
Please call pt tomorrow (Tuesday) and get pt scheduled if symptoms are persisting or he has concerns. I sent a brief message to him.     VAMSI Nicholas CNP

## 2024-11-26 NOTE — PATIENT INSTRUCTIONS
Get plenty of fluids    Start macrobid today    If culture shows you should be on a different antibiotic we will let you know

## 2024-11-26 NOTE — PROGRESS NOTES
Assessment and Plan:     (R30.0) Dysuria  (primary encounter diagnosis)  Comment: onset in last two days, has history of UTI and feels the same, following with urology for LUTS/UTIs, no systemic symptoms, no perineal pain, no abdominal pain/tenderness, has some mild back pain this am which resolved, no CVA tenderness  Plan: UA Macroscopic with reflex to Microscopic and         Culture - Clinic Collect, nitroFURantoin         macrocrystal-monohydrate (MACROBID) 100 MG         capsule, Urine Culture Aerobic Bacterial - lab         collect        UA reviewed and negative, given sxs will treat and UC added on  If UC negative, will discontinue abx  Discussed reasons to be seen promptly   Push fluids  Keep follow-up with urology    (R39.9) Lower urinary tract symptoms (LUTS)  Comment: following with urology, has PVR scheduled in December   Plan: cont above    (I10) Primary hypertension  Comment: has had some high pressures, but mostly within acceptable range  Plan: continue current meds, continue to monitor    CHELSIE Buck Same Day Provider   30 minutes on the day of the encounter doing chart review, history and exam, documentation and further activities as noted above.      Vernell Zamorano is a 84 year old, presenting for the following health issues:  Urinary Problem and Hypertension    History of Present Illness       Reason for visit:  Urinary tract infection    He eats 4 or more servings of fruits and vegetables daily.He consumes 2 sweetened beverage(s) daily.He exercises with enough effort to increase his heart rate 20 to 29 minutes per day.  He exercises with enough effort to increase his heart rate 4 days per week.   He is taking medications regularly.     Mr. Craven is here for UTI symptoms  Onset was a couple days ago  He is having urgency, frequency and occasional dysuria  He is following with urology for UTIs/BPH, plan is to have PVR in 12/5/24 and if >200, cystoscopy with Dr. Holland  He  is also following with ID  He denies fever, abdominal pain, perineal pain     He has had itching with bactrim in the past     His BP has been running a bit high intermittently has three readings from home 164/83 yesterday   Running mostly 120s-140s/70s      Objective      BP (!) 152/81   Pulse 70   Temp 98.9  F (37.2  C) (Temporal)   Resp 22   Ht 1.829 m (6')   Wt 118.9 kg (262 lb 3.2 oz)   SpO2 93%   BMI 35.56 kg/m        Physical Exam     GENERAL: in NAD  ENT: mmm, op clear   RESP: lungs clear to auscultation - no rales, no rhonchi, no wheezes  CV: regular rates and rhythm, normal S1 S2, no S3 or S4 and no murmur, no click or rub   ABD: soft, NT, +BS, no CVA tenderness   MS: extremities- chronic lymphedema bilat lower ext         Signed Electronically by: Alexia Holland PA-C

## 2024-11-27 ENCOUNTER — TELEPHONE (OUTPATIENT)
Dept: FAMILY MEDICINE | Facility: CLINIC | Age: 84
End: 2024-11-27

## 2024-11-27 ENCOUNTER — PRE VISIT (OUTPATIENT)
Dept: PULMONOLOGY | Facility: CLINIC | Age: 84
End: 2024-11-27

## 2024-11-27 LAB — BACTERIA UR CULT: NORMAL

## 2024-11-27 NOTE — RESULT ENCOUNTER NOTE
Team - please call patient with results.  His urine was negative  How is he feeling?    He can stop the abx I prescribed yesterday and will need to follow-up with urology as planned  Thanks

## 2024-11-27 NOTE — TELEPHONE ENCOUNTER
Cielo, please advise.    Spoke to pt and relayed your notes from lab results below. Pt reports feeling better while taking the antibiotics but is still experiencing intermittent fatigue. Pt reports that he slept well last night and had vivid dreams (pt described them as 'weird', he wonders if antibiotics causing dreams). Pt states urination has improved and his head is feeling better also.    Pt scheduled with Urology on 12/5.     Per our virtual huddle regarding pt's request to continue antibiotics and your okay/consent for him to do so, educated pt on continuing his order and stated that we likely won't refill them once complete. Pt verbalized understanding.     Trudi Kaufman RN

## 2024-12-02 ENCOUNTER — DOCUMENTATION ONLY (OUTPATIENT)
Dept: FAMILY MEDICINE | Facility: CLINIC | Age: 84
End: 2024-12-02

## 2024-12-02 ENCOUNTER — THERAPY VISIT (OUTPATIENT)
Dept: OCCUPATIONAL THERAPY | Facility: CLINIC | Age: 84
End: 2024-12-02
Payer: MEDICARE

## 2024-12-02 ENCOUNTER — MYC MEDICAL ADVICE (OUTPATIENT)
Dept: FAMILY MEDICINE | Facility: CLINIC | Age: 84
End: 2024-12-02

## 2024-12-02 DIAGNOSIS — G47.33 OSA (OBSTRUCTIVE SLEEP APNEA): ICD-10-CM

## 2024-12-02 DIAGNOSIS — J44.9 CHRONIC OBSTRUCTIVE PULMONARY DISEASE, UNSPECIFIED (H): Primary | ICD-10-CM

## 2024-12-02 DIAGNOSIS — I89.0 LYMPHEDEMA: Primary | ICD-10-CM

## 2024-12-02 PROCEDURE — 97140 MANUAL THERAPY 1/> REGIONS: CPT | Mod: GO | Performed by: OCCUPATIONAL THERAPIST

## 2024-12-04 ENCOUNTER — THERAPY VISIT (OUTPATIENT)
Dept: PHYSICAL THERAPY | Facility: CLINIC | Age: 84
End: 2024-12-04
Attending: INTERNAL MEDICINE
Payer: MEDICARE

## 2024-12-04 DIAGNOSIS — J42 CHRONIC BRONCHITIS, UNSPECIFIED CHRONIC BRONCHITIS TYPE (H): Primary | ICD-10-CM

## 2024-12-04 DIAGNOSIS — R26.2 DIFFICULTY WALKING: ICD-10-CM

## 2024-12-04 DIAGNOSIS — R26.89 IMPAIRMENT OF BALANCE: ICD-10-CM

## 2024-12-04 DIAGNOSIS — R29.898 LEG WEAKNESS, BILATERAL: Primary | ICD-10-CM

## 2024-12-04 PROCEDURE — 97162 PT EVAL MOD COMPLEX 30 MIN: CPT | Mod: GP | Performed by: PHYSICAL THERAPIST

## 2024-12-04 PROCEDURE — 97110 THERAPEUTIC EXERCISES: CPT | Mod: GP | Performed by: PHYSICAL THERAPIST

## 2024-12-04 NOTE — PROGRESS NOTES
PHYSICAL THERAPY EVALUATION  Type of Visit: Evaluation    Subjective         Presenting condition or subjective complaint: (Patient-Rptd) Swelling of feet and legs causes difficulty walking.   Pt referred to OP PT for balance impairments going on multiple years. 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 Pt has a walker and cane at home but primarily uses the cane when in the community. Does not use any device in the home.  Denies any falls in the past few years.  Wants to be able to walk the dog more comfortably.   Pt notes he had difficulty with standing from chairs and needs to use his arms to assist.   Date of onset: 10/30/24    Relevant medical history: (Patient-Rptd) Bladder or bowel problems; Change in skin color; Cold or hot arm or leg; COPD   Dates & types of surgery:    Past Surgical History:   Procedure Laterality Date    cardiac stenting      COLONOSCOPY  2010    CV RIGHT HEART CATH MEASUREMENTS RECORDED N/A 11/05/2019    Procedure: Right Heart Cath;  Surgeon: Roberto Hernandez MD;  Location:  [facility]    ENT SURGERY      tonsillectomy    EP PACEMAKER GENERATOR REPLACEMENT- DUAL N/A 9/23/2024    Procedure: Pacemaker Generator Replacement Dual;  Surgeon: Denis Perez MD;  Location:  HE[facility] CARD[facility] CATH LAB    EYE SURGERY  2012    IMPLANT PACEMAKER      INJECT NERVE BLOCK SUPRASCAPULAR Bilateral 08/26/2021    Procedure: BLOCK, NERVE, SUPRASCAPULAR, Bilateral, under ultrasound guidance;  Surgeon: Swetha Allen MD;  Location: [facility] OR    INJECT NERVE BLOCK SUPRASCAPULAR Bilateral 09/23/2021    Procedure: bilateral suprascapular nerve block;  Surgeon: Swetha Allen MD;  Location: [facility] OR    INJECT NERVE BLOCK SUPRASCAPULAR Left 09/15/2022    Procedure: BLOCK, NERVE, SUPRASCAPULAR- left;  Surgeon: Swetha Allen MD;  Location: [facility] OR    INJECT NERVE BLOCK SUPRASCAPULAR Left 10/20/2022    Procedure: BLOCK, NERVE, SUPRASCAPULAR- left;  Surgeon: Swetha Allen MD;   Location: [facility] OR    INJECT NERVE BLOCK SUPRASCAPULAR Left 01/05/2023    Procedure: BLOCK, NERVE, SUPRASCAPULAR (left);  Surgeon: Swetha Allen MD;  Location: [facility] OR    NERVE BLOCK PERIPHERAL Bilateral 03/10/2022    Procedure: bilateral suprascapular nerve block;  Surgeon: Swetha Allen MD;  Location: [facility] OR    ORTHOPEDIC SURGERY Right 2012    knee replacement    REVERSE ARTHROPLASTY SHOULDER Left 02/21/2023    Procedure: ARTHROPLASTY, SHOULDER, TOTAL, REVERSE LEFT;  Surgeon: Katelyn Vasquez MD;  [facility]    VASCULAR SURGERY  2022     Prior diagnostic imaging/testing results:       Prior therapy history for the same diagnosis, illness or injury: (Patient-Rptd) Yes      Prior Level of Function  Transfers: Independent  Ambulation: Assistive equipment  ADL: Independent  IADL:     Living Environment  Social support:     Type of home: (Patient-Rptd) Apartment/condo   Stairs to enter the home: (Patient-Rptd) No       Ramp: (Patient-Rptd) No   Stairs inside the home: (Patient-Rptd) No       Help at home: (Patient-Rptd) Self Cares (home health aide/personal care attendant, family, etc)  Equipment owned: (Patient-Rptd) Straight Cane     Employment: (Patient-Rptd) No    Hobbies/Interests: (Patient-Rptd) zoom meetings - walking with pet - writing.    Patient goals for therapy:  pt would like gain confidence with his balance in the community.      Pain assessment: Pain denied     Objective     OBSERVATION: increased effort to stand from lobby chair.  Uses SEC.   POSTURE:  rounded shoulders and forward head posture    RANGE OF MOTION: LE ROM WFL  STRENGTH:  L LE: hip flexion 3/5, hip abduction: 4/5, knee extension: 3/5, knee flexion: 3+/5, DF: 4/5     R LE: hip flexion: 3+/5, hip abduction 4/5 , knee extension : 4/5, knee flexion: 4/5, DF: 4/5    BED MOBILITY: Independent, with grab bar    TRANSFERS: Independent, with UE support    GAIT:   Gait Description: Wider SUZY, reduced step length B, reduced foot  clearance B. No change in gait mechanics with or without SEC    BALANCE:     SPECIAL TESTS  10 Meter Walk Test (Comfortable)  0.45 m/s with SEC;   19 steps   5 Times Sit-to-Stand (5TSTS)  0     Dynamic Gait Index (DGI) Total Score (out of 24): 13   (<19/24 indicates fall risk)   Timed Up and Go (TUG) - sec 24.37  without SEC    29.94 seconds with SEC   Single Leg Stance Right (sec) Unable   Single Leg Stance Left (sec) unable   Modified CTSIB Conditions (sec) Cond 1:  30  Cond 2:  8.45   Cond 4: NT  Cond 5 :  NT       SENSATION:  NT    Assessment & Plan   CLINICAL IMPRESSIONS  Medical Diagnosis: At moderate fall risk: Weakness of lower extremity, unspecified laterality    Treatment Diagnosis: B LE weakness, impaired balance and mobility   Impression/Assessment: Patient is a 84 year old male with imbalance and weakness complaints.  The following significant findings have been identified: Pain, Decreased strength, Impaired balance, Impaired muscle performance, Decreased activity tolerance, Impaired posture, and Instability. These impairments interfere with their ability to perform recreational activities, household mobility, and community mobility as compared to previous level of function.     Clinical Decision Making (Complexity):  Clinical Presentation: Evolving/Changing  Clinical Presentation Rationale: based on medical and personal factors listed in PT evaluation  Clinical Decision Making (Complexity): Moderate complexity    PLAN OF CARE  Treatment Interventions:  Interventions: Gait Training, Neuromuscular Re-education, Therapeutic Activity, Therapeutic Exercise, Self-Care/Home Management, Standardized Testing    Long Term Goals     PT Goal 1  Goal Identifier: Gait speed  Goal Description: Pt will ambulate at a comfortable gait speed of >0.55 m/s with AAD  Rationale: to maximize safety and independence within the home;to maximize safety and independence within the community  Goal Progress: baseline: 0.45 m/s with  SEC  Target Date: 02/26/25  PT Goal 2  Goal Identifier: Sit to stand  Goal Description: Pt will complete 1 stand without UE A from standard height chair  Rationale: to maximize safety and independence with performance of ADLs and functional tasks  Goal Progress: baseline: unable  Target Date: 02/26/25  PT Goal 3  Goal Identifier: TUG  Goal Description: Pt will complete TUG in <20 seocnds with AAD in order to reduce fall risk  Rationale: to maximize safety and independence within the home;to maximize safety and independence with performance of ADLs and functional tasks  Goal Progress: Baseline: 24.37 seconds without AD  Target Date: 02/26/25  PT Goal 4  Goal Identifier: HEP  Goal Description: pt will be independent with a HEP to self manage symptoms  Target Date: 02/26/25      Frequency of Treatment: 1x/wk  Duration of Treatment: 12 weeks    Recommended Referrals to Other Professionals:   Education Assessment:   Learner/Method: Patient;Demonstration;Pictures/Video;No Barriers to Learning  Education Comments: PT role, POC and HEP    Risks and benefits of evaluation/treatment have been explained.   Patient/Family/caregiver agrees with Plan of Care.     Evaluation Time:     PT Eval, Moderate Complexity Minutes (88000): 30       Signing Clinician: Newton Brown, PT        [facility]                                                                                   OUTPATIENT PHYSICAL THERAPY      PLAN OF TREATMENT FOR OUTPATIENT REHABILITATION   Patient's Last Name, First Name, Erich Monet YOB: 1940   Provider's Name   [facility]   Medical Record No.  [id number]     Onset Date: 10/30/24  Start of Care Date: 12/04/24     Medical Diagnosis:  At moderate fall risk: Weakness of lower extremity, unspecified laterality      PT Treatment Diagnosis:  B LE weakness, impaired balance and mobility Plan of Treatment  Frequency/Duration:  1x/wk/ 12 weeks    Certification date from 12/04/24 to 02/26/25         See note for plan of treatment details and functional goals     Newton Brown, PT                         I CERTIFY THE NEED FOR THESE SERVICES FURNISHED UNDER        THIS PLAN OF TREATMENT AND WHILE UNDER MY CARE     (Physician attestation of this document indicates review and certification of the therapy plan).              Referring Provider:  Anne Heath    Initial Assessment  See Epic Evaluation- Start of Care Date: 12/04/24

## 2024-12-04 NOTE — Clinical Note
Future Appointments 12/30/2024 10:15 AM   Daria Davis, PT        EDPT                [address] 1/6/2025   11:00 AM   Newton Brown, PT   EDPT                [address] 1/8/2025   9:00 AM    Anne Heath MD    [facility]              [facility] 1/10/2025  12:00 AM   [facility] 1/13/2025  8:45 AM    Newton Brown, PT   EDPT                [address] 1/20/2025  9:30 AM    Newton Brown, PT   EDPT                [address] 1/27/2025  9:30 AM    Newton Brown, PT   EDPT                [address] 2/5/2025   9:30 AM    Newton Brown, PT   EDPT                [address] 2/12/2025  9:30 AM    Newton Brown, PT   EDPT                [address] 2/19/2025  9:30 AM    Newton Brown, PT   EDPT                [address] 3/12/2025  12:00 PM   CS PULMONARY FUNCTION      CSPULM              CS 3/12/2025  1:00 PM    Nicole Haro PA* CSPULM              CS

## 2024-12-04 NOTE — TELEPHONE ENCOUNTER
Dr. Heath,  Please see mychart message from patient asking you to sign the oxygen order.   Vanesa Santillan RN

## 2024-12-05 ENCOUNTER — ALLIED HEALTH/NURSE VISIT (OUTPATIENT)
Dept: UROLOGY | Facility: CLINIC | Age: 84
End: 2024-12-05
Payer: MEDICARE

## 2024-12-05 ENCOUNTER — MEDICAL CORRESPONDENCE (OUTPATIENT)
Dept: HEALTH INFORMATION MANAGEMENT | Facility: CLINIC | Age: 84
End: 2024-12-05

## 2024-12-05 DIAGNOSIS — R39.9 LOWER URINARY TRACT SYMPTOMS (LUTS): ICD-10-CM

## 2024-12-05 DIAGNOSIS — N39.0 RECURRENT UTI: Primary | ICD-10-CM

## 2024-12-05 NOTE — PROGRESS NOTES
Chief Complaint   Patient presents with    Lower urinary tract symptoms (LUTS)     Patient here today for PVR         Erich Craven comes into clinic today for Incomplete Bladder Emptying  at the request of BRIANA Cheng,  Ordering Provider for PVR.      Patient here today because he taking Flomax  Patient voided in clinic   Patient bladder was scan  Patient  ml by scan which is under 200 ml  Patient he doing well and sometime double void to make sure he empty             This service provided today was under the supervising provider of the day Dr Vick, who was available if needed.    ANKIT Hawkins

## 2024-12-06 ENCOUNTER — THERAPY VISIT (OUTPATIENT)
Dept: OCCUPATIONAL THERAPY | Facility: CLINIC | Age: 84
End: 2024-12-06
Payer: MEDICARE

## 2024-12-06 DIAGNOSIS — I89.0 LYMPHEDEMA: Primary | ICD-10-CM

## 2024-12-06 PROCEDURE — 97140 MANUAL THERAPY 1/> REGIONS: CPT | Mod: GO | Performed by: OCCUPATIONAL THERAPIST

## 2024-12-09 ENCOUNTER — THERAPY VISIT (OUTPATIENT)
Dept: OCCUPATIONAL THERAPY | Facility: CLINIC | Age: 84
End: 2024-12-09
Payer: MEDICARE

## 2024-12-09 DIAGNOSIS — I89.0 LYMPHEDEMA: Primary | ICD-10-CM

## 2024-12-09 PROCEDURE — 97140 MANUAL THERAPY 1/> REGIONS: CPT | Mod: GO | Performed by: OCCUPATIONAL THERAPIST

## 2024-12-09 NOTE — TELEPHONE ENCOUNTER
Is the implanted device MRI conditional: No, mixed vendor system. [employer] leads.    Approved for MRI utilizing the MRI non-conditional protocol by VAMSI Barry CNP    Please schedule MRI: MRI is already scheduled, just need approval to proceed from [facility].     Does the patient need a CXR prior to MRI: No    Device: [employer] L331 ACCOLADE MRI EL

## 2024-12-16 ENCOUNTER — THERAPY VISIT (OUTPATIENT)
Dept: OCCUPATIONAL THERAPY | Facility: CLINIC | Age: 84
End: 2024-12-16
Payer: MEDICARE

## 2024-12-16 DIAGNOSIS — I89.0 LYMPHEDEMA: Primary | ICD-10-CM

## 2024-12-16 PROCEDURE — 97140 MANUAL THERAPY 1/> REGIONS: CPT | Mod: GO | Performed by: OCCUPATIONAL THERAPIST

## 2024-12-17 ENCOUNTER — HOSPITAL ENCOUNTER (OUTPATIENT)
Dept: MRI IMAGING | Facility: CLINIC | Age: 84
Discharge: HOME | End: 2024-12-17
Attending: INTERNAL MEDICINE
Payer: MEDICARE

## 2024-12-17 ENCOUNTER — MEDICAL CORRESPONDENCE (OUTPATIENT)
Dept: HEALTH INFORMATION MANAGEMENT | Facility: CLINIC | Age: 84
End: 2024-12-17

## 2024-12-17 ENCOUNTER — ANCILLARY PROCEDURE (OUTPATIENT)
Dept: CARDIOLOGY | Facility: CLINIC | Age: 84
End: 2024-12-17
Attending: INTERNAL MEDICINE
Payer: MEDICARE

## 2024-12-17 VITALS — HEART RATE: 77 BPM | OXYGEN SATURATION: 87 %

## 2024-12-17 DIAGNOSIS — D32.9 MENINGIOMA (H): ICD-10-CM

## 2024-12-17 DIAGNOSIS — G93.9 LESION OF PONS: ICD-10-CM

## 2024-12-17 DIAGNOSIS — Z45.02 FITTING AND ADJUSTMENT OF AUTOMATIC IMPLANTABLE CARDIOVERTER-DEFIBRILLATOR: ICD-10-CM

## 2024-12-17 DIAGNOSIS — Z45.02 FITTING AND ADJUSTMENT OF AUTOMATIC IMPLANTABLE CARDIOVERTER-DEFIBRILLATOR: Primary | ICD-10-CM

## 2024-12-17 LAB
MDC_IDC_EPISODE_DTM: NORMAL
MDC_IDC_EPISODE_ID: NORMAL
MDC_IDC_EPISODE_TYPE: NORMAL
MDC_IDC_LEAD_CONNECTION_STATUS: NORMAL
MDC_IDC_LEAD_IMPLANT_DT: NORMAL
MDC_IDC_LEAD_LOCATION: NORMAL
MDC_IDC_LEAD_LOCATION_DETAIL_1: NORMAL
MDC_IDC_LEAD_MFG: NORMAL
MDC_IDC_LEAD_MODEL: NORMAL
MDC_IDC_LEAD_POLARITY_TYPE: NORMAL
MDC_IDC_LEAD_SERIAL: NORMAL
MDC_IDC_MSMT_BATTERY_DTM: NORMAL
MDC_IDC_MSMT_BATTERY_DTM: NORMAL
MDC_IDC_MSMT_BATTERY_REMAINING_LONGEVITY: 174 MO
MDC_IDC_MSMT_BATTERY_REMAINING_LONGEVITY: 174 MO
MDC_IDC_MSMT_BATTERY_REMAINING_PERCENTAGE: 100 %
MDC_IDC_MSMT_BATTERY_REMAINING_PERCENTAGE: 100 %
MDC_IDC_MSMT_BATTERY_STATUS: NORMAL
MDC_IDC_MSMT_BATTERY_STATUS: NORMAL
MDC_IDC_MSMT_LEADCHNL_RA_IMPEDANCE_VALUE: 423 OHM
MDC_IDC_MSMT_LEADCHNL_RA_IMPEDANCE_VALUE: 437 OHM
MDC_IDC_MSMT_LEADCHNL_RA_PACING_THRESHOLD_AMPLITUDE: 0.4 V
MDC_IDC_MSMT_LEADCHNL_RA_PACING_THRESHOLD_AMPLITUDE: 0.4 V
MDC_IDC_MSMT_LEADCHNL_RA_PACING_THRESHOLD_PULSEWIDTH: 0.4 MS
MDC_IDC_MSMT_LEADCHNL_RA_PACING_THRESHOLD_PULSEWIDTH: 0.4 MS
MDC_IDC_MSMT_LEADCHNL_RV_IMPEDANCE_VALUE: 417 OHM
MDC_IDC_MSMT_LEADCHNL_RV_IMPEDANCE_VALUE: 438 OHM
MDC_IDC_MSMT_LEADCHNL_RV_PACING_THRESHOLD_AMPLITUDE: 1.4 V
MDC_IDC_MSMT_LEADCHNL_RV_PACING_THRESHOLD_AMPLITUDE: 1.6 V
MDC_IDC_MSMT_LEADCHNL_RV_PACING_THRESHOLD_PULSEWIDTH: 1 MS
MDC_IDC_MSMT_LEADCHNL_RV_PACING_THRESHOLD_PULSEWIDTH: 1 MS
MDC_IDC_PG_IMPLANT_DTM: NORMAL
MDC_IDC_PG_IMPLANT_DTM: NORMAL
MDC_IDC_PG_MFG: NORMAL
MDC_IDC_PG_MFG: NORMAL
MDC_IDC_PG_MODEL: NORMAL
MDC_IDC_PG_MODEL: NORMAL
MDC_IDC_PG_SERIAL: NORMAL
MDC_IDC_PG_SERIAL: NORMAL
MDC_IDC_PG_TYPE: NORMAL
MDC_IDC_PG_TYPE: NORMAL
MDC_IDC_SESS_CLINIC_NAME: NORMAL
MDC_IDC_SESS_CLINIC_NAME: NORMAL
MDC_IDC_SESS_DTM: NORMAL
MDC_IDC_SESS_DTM: NORMAL
MDC_IDC_SESS_TYPE: NORMAL
MDC_IDC_SESS_TYPE: NORMAL
MDC_IDC_SET_BRADY_AT_MODE_SWITCH_MODE: NORMAL
MDC_IDC_SET_BRADY_AT_MODE_SWITCH_MODE: NORMAL
MDC_IDC_SET_BRADY_AT_MODE_SWITCH_RATE: 150 {BEATS}/MIN
MDC_IDC_SET_BRADY_AT_MODE_SWITCH_RATE: 150 {BEATS}/MIN
MDC_IDC_SET_BRADY_LOWRATE: 60 {BEATS}/MIN
MDC_IDC_SET_BRADY_LOWRATE: 60 {BEATS}/MIN
MDC_IDC_SET_BRADY_MAX_SENSOR_RATE: 130 {BEATS}/MIN
MDC_IDC_SET_BRADY_MAX_SENSOR_RATE: 130 {BEATS}/MIN
MDC_IDC_SET_BRADY_MAX_TRACKING_RATE: 130 {BEATS}/MIN
MDC_IDC_SET_BRADY_MAX_TRACKING_RATE: 130 {BEATS}/MIN
MDC_IDC_SET_BRADY_MODE: NORMAL
MDC_IDC_SET_BRADY_MODE: NORMAL
MDC_IDC_SET_BRADY_PAV_DELAY_HIGH: 220 MS
MDC_IDC_SET_BRADY_PAV_DELAY_HIGH: 220 MS
MDC_IDC_SET_BRADY_PAV_DELAY_LOW: 250 MS
MDC_IDC_SET_BRADY_PAV_DELAY_LOW: 250 MS
MDC_IDC_SET_BRADY_SAV_DELAY_HIGH: 220 MS
MDC_IDC_SET_BRADY_SAV_DELAY_HIGH: 220 MS
MDC_IDC_SET_BRADY_SAV_DELAY_LOW: 250 MS
MDC_IDC_SET_BRADY_SAV_DELAY_LOW: 250 MS
MDC_IDC_SET_LEADCHNL_RA_PACING_AMPLITUDE: 2 V
MDC_IDC_SET_LEADCHNL_RA_PACING_AMPLITUDE: 2 V
MDC_IDC_SET_LEADCHNL_RA_PACING_CAPTURE_MODE: NORMAL
MDC_IDC_SET_LEADCHNL_RA_PACING_CAPTURE_MODE: NORMAL
MDC_IDC_SET_LEADCHNL_RA_PACING_POLARITY: NORMAL
MDC_IDC_SET_LEADCHNL_RA_PACING_POLARITY: NORMAL
MDC_IDC_SET_LEADCHNL_RA_PACING_PULSEWIDTH: 0.4 MS
MDC_IDC_SET_LEADCHNL_RA_PACING_PULSEWIDTH: 0.4 MS
MDC_IDC_SET_LEADCHNL_RA_SENSING_ADAPTATION_MODE: NORMAL
MDC_IDC_SET_LEADCHNL_RA_SENSING_ADAPTATION_MODE: NORMAL
MDC_IDC_SET_LEADCHNL_RA_SENSING_POLARITY: NORMAL
MDC_IDC_SET_LEADCHNL_RA_SENSING_POLARITY: NORMAL
MDC_IDC_SET_LEADCHNL_RA_SENSING_SENSITIVITY: 0.5 MV
MDC_IDC_SET_LEADCHNL_RA_SENSING_SENSITIVITY: 0.5 MV
MDC_IDC_SET_LEADCHNL_RV_PACING_AMPLITUDE: 2.6 V
MDC_IDC_SET_LEADCHNL_RV_PACING_AMPLITUDE: 2.6 V
MDC_IDC_SET_LEADCHNL_RV_PACING_CAPTURE_MODE: NORMAL
MDC_IDC_SET_LEADCHNL_RV_PACING_CAPTURE_MODE: NORMAL
MDC_IDC_SET_LEADCHNL_RV_PACING_POLARITY: NORMAL
MDC_IDC_SET_LEADCHNL_RV_PACING_POLARITY: NORMAL
MDC_IDC_SET_LEADCHNL_RV_PACING_PULSEWIDTH: 1 MS
MDC_IDC_SET_LEADCHNL_RV_PACING_PULSEWIDTH: 1 MS
MDC_IDC_SET_LEADCHNL_RV_SENSING_ADAPTATION_MODE: NORMAL
MDC_IDC_SET_LEADCHNL_RV_SENSING_ADAPTATION_MODE: NORMAL
MDC_IDC_SET_LEADCHNL_RV_SENSING_POLARITY: NORMAL
MDC_IDC_SET_LEADCHNL_RV_SENSING_POLARITY: NORMAL
MDC_IDC_SET_LEADCHNL_RV_SENSING_SENSITIVITY: 1 MV
MDC_IDC_SET_LEADCHNL_RV_SENSING_SENSITIVITY: 1 MV
MDC_IDC_SET_ZONE_DETECTION_INTERVAL: 375 MS
MDC_IDC_SET_ZONE_DETECTION_INTERVAL: 375 MS
MDC_IDC_SET_ZONE_STATUS: NORMAL
MDC_IDC_SET_ZONE_STATUS: NORMAL
MDC_IDC_SET_ZONE_TYPE: NORMAL
MDC_IDC_SET_ZONE_TYPE: NORMAL
MDC_IDC_SET_ZONE_VENDOR_TYPE: NORMAL
MDC_IDC_SET_ZONE_VENDOR_TYPE: NORMAL
MDC_IDC_STAT_AT_BURDEN_PERCENT: 0 %
MDC_IDC_STAT_AT_BURDEN_PERCENT: 0 %
MDC_IDC_STAT_AT_DTM_END: NORMAL
MDC_IDC_STAT_AT_DTM_END: NORMAL
MDC_IDC_STAT_AT_DTM_START: NORMAL
MDC_IDC_STAT_AT_DTM_START: NORMAL
MDC_IDC_STAT_BRADY_DTM_END: NORMAL
MDC_IDC_STAT_BRADY_DTM_END: NORMAL
MDC_IDC_STAT_BRADY_DTM_START: NORMAL
MDC_IDC_STAT_BRADY_DTM_START: NORMAL
MDC_IDC_STAT_BRADY_RA_PERCENT_PACED: 26 %
MDC_IDC_STAT_BRADY_RA_PERCENT_PACED: 26 %
MDC_IDC_STAT_BRADY_RV_PERCENT_PACED: 0 %
MDC_IDC_STAT_BRADY_RV_PERCENT_PACED: 0 %
MDC_IDC_STAT_EPISODE_RECENT_COUNT: 0
MDC_IDC_STAT_EPISODE_RECENT_COUNT: 1
MDC_IDC_STAT_EPISODE_RECENT_COUNT_DTM_END: NORMAL
MDC_IDC_STAT_EPISODE_RECENT_COUNT_DTM_START: NORMAL
MDC_IDC_STAT_EPISODE_TYPE: NORMAL
MDC_IDC_STAT_EPISODE_VENDOR_TYPE: NORMAL

## 2024-12-17 PROCEDURE — 255N000002 HC RX 255 OP 636: Performed by: INTERNAL MEDICINE

## 2024-12-17 PROCEDURE — 93286 PERI-PX EVAL PM/LDLS PM IP: CPT

## 2024-12-17 PROCEDURE — 93286 PERI-PX EVAL PM/LDLS PM IP: CPT | Mod: 26 | Performed by: INTERNAL MEDICINE

## 2024-12-17 PROCEDURE — A9585 GADOBUTROL INJECTION: HCPCS | Performed by: INTERNAL MEDICINE

## 2024-12-17 PROCEDURE — 70553 MRI BRAIN STEM W/O & W/DYE: CPT | Mod: MG

## 2024-12-17 PROCEDURE — G1010 CDSM STANSON: HCPCS | Performed by: STUDENT IN AN ORGANIZED HEALTH CARE EDUCATION/TRAINING PROGRAM

## 2024-12-17 PROCEDURE — 70553 MRI BRAIN STEM W/O & W/DYE: CPT | Mod: 26 | Performed by: STUDENT IN AN ORGANIZED HEALTH CARE EDUCATION/TRAINING PROGRAM

## 2024-12-17 RX ORDER — GADOBUTROL 604.72 MG/ML
0.1 INJECTION INTRAVENOUS ONCE
Status: COMPLETED | OUTPATIENT
Start: 2024-12-17 | End: 2024-12-17

## 2024-12-17 RX ADMIN — GADOBUTROL 10 ML: 604.72 INJECTION INTRAVENOUS at 14:08

## 2024-12-17 NOTE — RESULT ENCOUNTER NOTE
The following letter pertains to your most recent diagnostic tests:    My name is Dr. Fairchild and I am covering for Dr. Heath  who is out of the office today.    Brain MRI shows for meningiomas which are benign (noncancerous) growths that surround the brain and nerve tissue.  Fortunately, none of these growths are pushing on the brain in a dangerous way.  I believe you have an appointment with a neurosurgeon later this week.  You can delineate a plan on how to manage these meningiomas when you see the neurosurgery team.        Sincerely,    Dr. Fairchild

## 2024-12-20 ENCOUNTER — OFFICE VISIT (OUTPATIENT)
Dept: NEUROSURGERY | Facility: CLINIC | Age: 84
End: 2024-12-20
Payer: MEDICARE

## 2024-12-20 ENCOUNTER — PRE VISIT (OUTPATIENT)
Dept: NEUROSURGERY | Facility: CLINIC | Age: 84
End: 2024-12-20

## 2024-12-20 VITALS
BODY MASS INDEX: 35.74 KG/M2 | HEIGHT: 72 IN | DIASTOLIC BLOOD PRESSURE: 80 MMHG | SYSTOLIC BLOOD PRESSURE: 151 MMHG | HEART RATE: 68 BPM | OXYGEN SATURATION: 95 % | WEIGHT: 263.9 LBS

## 2024-12-20 DIAGNOSIS — D32.0 BENIGN NEOPLASM OF CEREBRAL MENINGES (H): Primary | ICD-10-CM

## 2024-12-20 PROCEDURE — 99204 OFFICE O/P NEW MOD 45 MIN: CPT | Performed by: NEUROLOGICAL SURGERY

## 2024-12-20 ASSESSMENT — PAIN SCALES - GENERAL: PAINLEVEL_OUTOF10: NO PAIN (0)

## 2024-12-20 NOTE — NURSING NOTE
Erich Craven is a 84 year old male who presents for:  Chief Complaint   Patient presents with    Consult     Patient said no symptoms today.        Initial Vitals:  BP (!) 151/80   Pulse 68   Ht 6' (1.829 m)   Wt 263 lb 14.4 oz (119.7 kg)   SpO2 95%   BMI 35.79 kg/m   Estimated body mass index is 35.79 kg/m  as calculated from the following:    Height as of this encounter: 6' (1.829 m).    Weight as of this encounter: 263 lb 14.4 oz (119.7 kg).. Body surface area is 2.47 meters squared. BP completed using cuff size: regular  No Pain (0)    Nursing Comments:       Chelle Thakur

## 2024-12-20 NOTE — LETTER
12/20/2024      Erich Craven  [address]  [place] [address]      Dear Colleague,    Thank you for referring your patient, Erich Craven, to the [facility]. Please see a copy of my visit note below.    It was a pleasure to see Erich Craven today in Neurosurgery Clinic. He is a 84 year old male who is here for review of his recent MRI.    According to the patient there were some findings on previous imaging many years ago and this is being followed up by his PCP.  He denies any symptoms that are concerning.    Past Medical History:   Diagnosis Date     (HFpEF) heart failure with preserved ejection fraction (H)      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 Started in 2012     Hypothyroidism      Lymphedema      Mumps      Obesity, Class III, BMI 40-49.9 (morbid obesity) (H)      RIN (obstructive sleep apnea)     has refused CPAP     Pacemaker      Paroxysmal A-fib (H)      PE (pulmonary thromboembolism) (H)      Status post coronary angiogram 11/05/2019     Past Surgical History:   Procedure Laterality Date     cardiac stenting       COLONOSCOPY  2010     CV RIGHT HEART CATH MEASUREMENTS RECORDED N/A 11/05/2019    Procedure: Right Heart Cath;  Surgeon: Roberto Hernandez MD;  Location:  HEART CARDIAC CATH LAB     ENT SURGERY      tonsillectomy     EP PACEMAKER GENERATOR REPLACEMENT- DUAL N/A 9/23/2024    Procedure: Pacemaker Generator Replacement Dual;   Surgeon: Denis Perez MD;  Location:  HEART CARDIAC CATH LAB     EYE SURGERY  2012     IMPLANT PACEMAKER       INJECT NERVE BLOCK SUPRASCAPULAR Bilateral 08/26/2021    Procedure: BLOCK, NERVE, SUPRASCAPULAR, Bilateral, under ultrasound guidance;  Surgeon: Swetha Allen MD;  Location: [facility] OR     INJECT NERVE BLOCK SUPRASCAPULAR Bilateral 09/23/2021    Procedure: bilateral suprascapular nerve block;  Surgeon: Swetha Allen MD;  Location: [facility] OR     INJECT NERVE BLOCK SUPRASCAPULAR Left 09/15/2022    Procedure: BLOCK, NERVE, SUPRASCAPULAR- left;  Surgeon: Swetha Allen MD;  Location: [facility] OR     INJECT NERVE BLOCK SUPRASCAPULAR Left 10/20/2022    Procedure: BLOCK, NERVE, SUPRASCAPULAR- left;  Surgeon: Swetha Allen MD;  Location: [facility] OR     INJECT NERVE BLOCK SUPRASCAPULAR Left 01/05/2023    Procedure: BLOCK, NERVE, SUPRASCAPULAR (left);  Surgeon: Swetha Allen MD;  Location: [facility] OR     NERVE BLOCK PERIPHERAL Bilateral 03/10/2022    Procedure: bilateral suprascapular nerve block;  Surgeon: Swetha Allen MD;  Location: [facility] OR     ORTHOPEDIC SURGERY Right 2012    knee replacement     REVERSE ARTHROPLASTY SHOULDER Left 02/21/2023    Procedure: ARTHROPLASTY, SHOULDER, TOTAL, REVERSE LEFT;  Surgeon: Katelyn Vasquez MD;  Location: [facility]     VASCULAR SURGERY  2022      No Known Allergies    Current Outpatient Medications:      acetaminophen (TYLENOL) 500 MG tablet, Take 1,000 mg by mouth 3 times daily as needed for mild pain., Disp: , Rfl:      albuterol (PROAIR HFA/PROVENTIL HFA/VENTOLIN HFA) 108 (90 Base) MCG/ACT inhaler, Inhale 2 puffs into the lungs every 6 hours as needed for shortness of breath, wheezing or cough., Disp: 18 g, Rfl: 1     aspirin (ASA) 81 MG chewable tablet, Take 1 tablet by mouth daily, Disp: , Rfl:      atorvastatin (LIPITOR) 80 MG tablet, Take 1 tablet (80 mg) by mouth every evening., Disp: 90 tablet, Rfl: 3     diltiazem ER COATED BEADS (CARDIZEM CD/CARTIA XT) 300 MG 24  hr capsule, Take 1 capsule (300 mg) by mouth daily, Disp: 90 capsule, Rfl: 3     ferrous sulfate 45 MG TBCR CR tablet, Take 1 tablet (45 mg) by mouth every other day., Disp: 45 tablet, Rfl: 0     Fluticasone-Umeclidin-Vilanterol (TRELEGY ELLIPTA) 200-62.5-25 MCG/ACT oral inhaler, INHALE 1 PUFF INTO THE LUNGS DAILY, Disp: 60 each, Rfl: 3     furosemide (LASIX) 40 MG tablet, Take 0.5 tablets (20 mg) by mouth 2 times daily., Disp: 90 tablet, Rfl: 2     levothyroxine (SYNTHROID/LEVOTHROID) 175 MCG tablet, Take 1 tablet (175 mcg) by mouth six times a week. 2 tablet (350 mcg) on Fridays, Disp: 102 tablet, Rfl: 1     metolazone (ZAROXOLYN) 2.5 MG tablet, Take 1 tablet (2.5 mg) by mouth every other day. Taken 30 minutes prior to furosemide, Disp: 45 tablet, Rfl: 0     Multiple vitamin TABS, Take 1 tablet by mouth daily, Disp: , Rfl:      nitroFURantoin macrocrystal-monohydrate (MACROBID) 100 MG capsule, Take 1 capsule (100 mg) by mouth 2 times daily., Disp: 14 capsule, Rfl: 0     polyethylene glycol (MIRALAX) 17 g packet, Take 17 g by mouth daily, Disp: 7 packet, Rfl: 0     potassium chloride cammy ER (KLOR-CON M10) 10 MEQ CR tablet, Take 1 tablet (10 mEq) by mouth 2 times daily., Disp: 180 tablet, Rfl: 1     rivaroxaban ANTICOAGULANT (XARELTO ANTICOAGULANT) 20 MG TABS tablet, Take 1 tablet (20 mg) by mouth daily (with dinner)., Disp: 90 tablet, Rfl: 3     tamsulosin (FLOMAX) 0.4 MG capsule, Take 1 capsule (0.4 mg) by mouth daily., Disp: 90 capsule, Rfl: 0     venlafaxine (EFFEXOR) 75 MG tablet, TAKE 1 TABLET(75 MG) BY MOUTH TWICE DAILY, Disp: 180 tablet, Rfl: 0  Social History     Socioeconomic History     Marital status:      Spouse name: None     Number of children: 5     Years of education: None     Highest education level: None   Occupational History     Occupation: retired teacher at [employer]   Tobacco Use     Smoking status: Former     Current packs/day: 0.00     Average packs/day: 0.5 packs/day for  30.0 years (15.0 ttl pk-yrs)     Types: Cigarettes     Start date: 6/15/1959     Quit date: 1989     Years since quittin.5     Passive exposure: Past     Smokeless tobacco: Never     Tobacco comments:     smoked from  to    Vaping Use     Vaping status: Never Used   Substance and Sexual Activity     Alcohol use: No     Comment: quit in ; recovering     Drug use: No     Sexual activity: Not Currently     Partners: Female   Other Topics Concern     Parent/sibling w/ CABG, MI or angioplasty before 65F 55M? No     Social Drivers of Health     Financial Resource Strain: Low Risk  (1/10/2024)    Financial Resource Strain      Within the past 12 months, have you or your family members you live with been unable to get utilities (heat, electricity) when it was really needed?: No   Food Insecurity: Low Risk  (1/10/2024)    Food Insecurity      Within the past 12 months, did you worry that your food would run out before you got money to buy more?: No      Within the past 12 months, did the food you bought just not last and you didn t have money to get more?: No   Transportation Needs: Low Risk  (1/10/2024)    Transportation Needs      Within the past 12 months, has lack of transportation kept you from medical appointments, getting your medicines, non-medical meetings or appointments, work, or from getting things that you need?: No   Physical Activity: Inactive (2022)    Received from [facility]    Exercise Vital Sign      Days of Exercise per Week: 0 days      Minutes of Exercise per Session: 0 min   Stress: No Stress Concern Present (2022)    Received from [facility]    [place] [employer] - Occupational Stress Questionnaire      Feeling of Stress : Not at all   Social Connections: Moderately Integrated (2022)    Received from [facility]    Social Connection and Isolation Panel [NHANES]      Frequency of Communication with Friends  and Family: More than three times a week      Frequency of Social Gatherings with Friends and Family: Once a week      Attends Ep[employer] Services: 1 to 4 times per year      Active Member of Clubs or Organizations: Yes      Attends Club or Organization Meetings: More than 4 times per year      Marital Status:    Interpersonal Safety: Low Risk  (11/12/2024)    Interpersonal Safety      Do you feel physically and emotionally safe where you currently live?: Yes      Within the past 12 months, have you been hit, slapped, kicked or otherwise physically hurt by someone?: No      Within the past 12 months, have you been humiliated or emotionally abused in other ways by your partner or ex-partner?: No   Housing Stability: Low Risk  (1/10/2024)    Housing Stability      Do you have housing? : Yes      Are you worried about losing your housing?: No      Problem (# of Occurrences) Relation (Name,Age of Onset)    Anxiety Disorder (1) Mother (Jaqueline)    Substance Abuse (1) Son (Yan)    Hypertension (1) Father (Yan)    Osteoporosis (1) Sister (Kennedi)    Cerebrovascular Disease (1) Father (Yan)    Thyroid Disease (1) Sister (Apple)    Obesity (1) Father (Yan)    Other Cancer (1) Mother (Jaqueline)    Brain Cancer (1) Mother (Jaqueline)           Negative family history of: Anesthesia Reaction, Thrombosis             ROS: 10 point ROS neg other than the symptoms noted above in the HPI.    Vitals:    12/20/24 1052   BP: (!) 151/80   Pulse: 68   SpO2: 95%   Weight: 119.7 kg (263 lb 14.4 oz)   Height: 1.829 m (6')     Body mass index is 35.79 kg/m .  No Pain (0)    Neurologically intact    Imaging: Review of his updated MRI from this week shows 3 meningiomas, 2 over the right frontal convexity and 1 in the left anterior temporal region.  There is not any vasogenic edema around these.  There is a CT scan from August 2018 that shows these meningiomas although it is difficult to see them given the lack of contrast given at  that time.  There may have been some growth over the years.  The imaging was reviewed with the patient shown to the patient in clinic today.    Assessment: Meningiomas, asymptomatic.    Plan: I have recommended repeat MRI in 2 to 3 years.  This may be done through our office or could be ordered by his PCP and followed up with them.         Again, thank you for allowing me to participate in the care of your patient.        Sincerely,        Cl Gross MD

## 2024-12-20 NOTE — PROGRESS NOTES
It was a pleasure to see Erich Craven today in [facility]. He is a 84 year old male who is here for review of his recent MRI.    According to the patient there were some findings on previous imaging many years ago and this is being followed up by his PCP.  He denies any symptoms that are concerning.    Past Medical History:   Diagnosis Date    (HFpEF) heart failure with preserved ejection fraction (H)     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 Started in 2012    Hypothyroidism     Lymphedema     Mumps     Obesity, Class III, BMI 40-49.9 (morbid obesity) (H)     RIN (obstructive sleep apnea)     has refused CPAP    Pacemaker     Paroxysmal A-fib (H)     PE (pulmonary thromboembolism) (H)     Status post coronary angiogram 11/05/2019     Past Surgical History:   Procedure Laterality Date    cardiac stenting      COLONOSCOPY  2010    CV RIGHT HEART CATH MEASUREMENTS RECORDED N/A 11/05/2019    Procedure: Right Heart Cath;  Surgeon: Roberto Hernandez MD;  Location: [facility] CARDIAC CATH LAB    ENT SURGERY      tonsillectomy    EP PACEMAKER GENERATOR REPLACEMENT- DUAL N/A 9/23/2024    Procedure: Pacemaker Generator Replacement Dual;  Surgeon: Denis Perez MD;  Location: [facility] CARDIAC CATH LAB    EYE SURGERY  2012    IMPLANT PACEMAKER      INJECT NERVE BLOCK SUPRASCAPULAR Bilateral 08/26/2021    Procedure: BLOCK, NERVE, SUPRASCAPULAR, Bilateral, under ultrasound guidance;  Surgeon: Swetha Allen MD;   Location: [facility] OR    INJECT NERVE BLOCK SUPRASCAPULAR Bilateral 09/23/2021    Procedure: bilateral suprascapular nerve block;  Surgeon: Swetha Allen MD;  Location: [facility] OR    INJECT NERVE BLOCK SUPRASCAPULAR Left 09/15/2022    Procedure: BLOCK, NERVE, SUPRASCAPULAR- left;  Surgeon: Swetha Allen MD;  Location: [facility] OR    INJECT NERVE BLOCK SUPRASCAPULAR Left 10/20/2022    Procedure: BLOCK, NERVE, SUPRASCAPULAR- left;  Surgeon: Swetha Allen MD;  Location: [facility] OR    INJECT NERVE BLOCK SUPRASCAPULAR Left 01/05/2023    Procedure: BLOCK, NERVE, SUPRASCAPULAR (left);  Surgeon: Swetha Allen MD;  Location: [facility] OR    NERVE BLOCK PERIPHERAL Bilateral 03/10/2022    Procedure: bilateral suprascapular nerve block;  Surgeon: Swetha Allen MD;  Location: [facility] OR    ORTHOPEDIC SURGERY Right 2012    knee replacement    REVERSE ARTHROPLASTY SHOULDER Left 02/21/2023    Procedure: ARTHROPLASTY, SHOULDER, TOTAL, REVERSE LEFT;  Surgeon: Katelyn Vasquez MD;  Location: [facility]    VASCULAR SURGERY  2022      No Known Allergies    Current Outpatient Medications:     acetaminophen (TYLENOL) 500 MG tablet, Take 1,000 mg by mouth 3 times daily as needed for mild pain., Disp: , Rfl:     albuterol (PROAIR HFA/PROVENTIL HFA/VENTOLIN HFA) 108 (90 Base) MCG/ACT inhaler, Inhale 2 puffs into the lungs every 6 hours as needed for shortness of breath, wheezing or cough., Disp: 18 g, Rfl: 1    aspirin (ASA) 81 MG chewable tablet, Take 1 tablet by mouth daily, Disp: , Rfl:     atorvastatin (LIPITOR) 80 MG tablet, Take 1 tablet (80 mg) by mouth every evening., Disp: 90 tablet, Rfl: 3    diltiazem ER COATED BEADS (CARDIZEM CD/CARTIA XT) 300 MG 24 hr capsule, Take 1 capsule (300 mg) by mouth daily, Disp: 90 capsule, Rfl: 3    ferrous sulfate 45 MG TBCR CR tablet, Take 1 tablet (45 mg) by mouth every other day., Disp: 45 tablet, Rfl: 0    Fluticasone-Umeclidin-Vilanterol (TRELEGY ELLIPTA) 200-62.5-25 MCG/ACT oral inhaler, INHALE 1 PUFF  INTO THE LUNGS DAILY, Disp: 60 each, Rfl: 3    furosemide (LASIX) 40 MG tablet, Take 0.5 tablets (20 mg) by mouth 2 times daily., Disp: 90 tablet, Rfl: 2    levothyroxine (SYNTHROID/LEVOTHROID) 175 MCG tablet, Take 1 tablet (175 mcg) by mouth six times a week. 2 tablet (350 mcg) on , Disp: 102 tablet, Rfl: 1    metolazone (ZAROXOLYN) 2.5 MG tablet, Take 1 tablet (2.5 mg) by mouth every other day. Taken 30 minutes prior to furosemide, Disp: 45 tablet, Rfl: 0    Multiple vitamin TABS, Take 1 tablet by mouth daily, Disp: , Rfl:     nitroFURantoin macrocrystal-monohydrate (MACROBID) 100 MG capsule, Take 1 capsule (100 mg) by mouth 2 times daily., Disp: 14 capsule, Rfl: 0    polyethylene glycol (MIRALAX) 17 g packet, Take 17 g by mouth daily, Disp: 7 packet, Rfl: 0    potassium chloride cammy ER (KLOR-CON M10) 10 MEQ CR tablet, Take 1 tablet (10 mEq) by mouth 2 times daily., Disp: 180 tablet, Rfl: 1    rivaroxaban ANTICOAGULANT (XARELTO ANTICOAGULANT) 20 MG TABS tablet, Take 1 tablet (20 mg) by mouth daily (with dinner)., Disp: 90 tablet, Rfl: 3    tamsulosin (FLOMAX) 0.4 MG capsule, Take 1 capsule (0.4 mg) by mouth daily., Disp: 90 capsule, Rfl: 0    venlafaxine (EFFEXOR) 75 MG tablet, TAKE 1 TABLET(75 MG) BY MOUTH TWICE DAILY, Disp: 180 tablet, Rfl: 0  Social History     Socioeconomic History    Marital status:      Spouse name: None    Number of children: 5    Years of education: None    Highest education level: None   Occupational History    Occupation: retired teacher at [employer]   Tobacco Use    Smoking status: Former     Current packs/day: 0.00     Average packs/day: 0.5 packs/day for 30.0 years (15.0 ttl pk-yrs)     Types: Cigarettes     Start date: 6/15/1959     Quit date: 1989     Years since quittin.5     Passive exposure: Past    Smokeless tobacco: Never    Tobacco comments:     smoked from  to    Vaping Use    Vaping status: Never Used   Substance and Sexual Activity     Alcohol use: 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; recovering    Drug use: No    Sexual activity: Not Currently     Partners: Female   Other Topics Concern    Parent/sibling w/ CABG, MI or angioplasty before 65F 55M? No     Social Drivers of Health     Financial Resource Strain: Low Risk  (1/10/2024)    Financial Resource Strain     Within the past 12 months, have you or your family members you live with been unable to get utilities (heat, electricity) when it was really needed?: No   Food Insecurity: Low Risk  (1/10/2024)    Food Insecurity     Within the past 12 months, did you worry that your food would run out before you got money to buy more?: No     Within the past 12 months, did the food you bought just not last and you didn t have money to get more?: No   Transportation Needs: Low Risk  (1/10/2024)    Transportation Needs     Within the past 12 months, has lack of transportation kept you from medical appointments, getting your medicines, non-medical meetings or appointments, work, or from getting things that you need?: No   Physical Activity: Inactive (7/1/2022)    Received from [facility]    Exercise Vital Sign     Days of Exercise per Week: 0 days     Minutes of Exercise per Session: 0 min   Stress: No Stress Concern Present (7/1/2022)    Received from [facility]    [place] [employer] - Occupational Stress Questionnaire     Feeling of Stress : Not at all   Social Connections: Moderately Integrated (7/1/2022)    Received from [facility]    Social Connection and Isolation Panel [NHANES]     Frequency of Communication with Friends and Family: More than three times a week     Frequency of Social Gatherings with Friends and Family: Once a week     Attends [employer] Services: 1 to 4 times per year     Active Member of Clubs or Organizations: Yes     Attends Club or Organization Meetings: More than 4 times per year     Marital Status:     Interpersonal Safety: Low Risk  (11/12/2024)    Interpersonal Safety     Do you feel physically and emotionally safe where you currently live?: Yes     Within the past 12 months, have you been hit, slapped, kicked or otherwise physically hurt by someone?: No     Within the past 12 months, have you been humiliated or emotionally abused in other ways by your partner or ex-partner?: No   Housing Stability: Low Risk  (1/10/2024)    Housing Stability     Do you have housing? : Yes     Are you worried about losing your housing?: No      Problem (# of Occurrences) Relation (Name,Age of Onset)    Anxiety Disorder (1) Mother (Jaqueline)    Substance Abuse (1) Son (Yan)    Hypertension (1) Father (Yan)    Osteoporosis (1) Sister (Kennedi)    Cerebrovascular Disease (1) Father (Yan)    Thyroid Disease (1) Sister (Apple)    Obesity (1) Father (Yan)    Other Cancer (1) Mother (Jaqueline)    Brain Cancer (1) Mother (Jaqueline)           Negative family history of: Anesthesia Reaction, Thrombosis             ROS: 10 point ROS neg other than the symptoms noted above in the HPI.    Vitals:    12/20/24 1052   BP: (!) 151/80   Pulse: 68   SpO2: 95%   Weight: 119.7 kg (263 lb 14.4 oz)   Height: 1.829 m (6')     Body mass index is 35.79 kg/m .  No Pain (0)    Neurologically intact    Imaging: Review of his updated MRI from this week shows 3 meningiomas, 2 over the right frontal convexity and 1 in the left anterior temporal region.  There is not any vasogenic edema around these.  There is a CT scan from August 2018 that shows these meningiomas although it is difficult to see them given the lack of contrast given at that time.  There may have been some growth over the years.  The imaging was reviewed with the patient shown to the patient in clinic today.    Assessment: Meningiomas, asymptomatic.    Plan: I have recommended repeat MRI in 2 to 3 years.  This may be done through our office or could be ordered by his PCP and followed up  with them.

## 2024-12-30 ENCOUNTER — THERAPY VISIT (OUTPATIENT)
Dept: PHYSICAL THERAPY | Facility: CLINIC | Age: 84
End: 2024-12-30
Payer: MEDICARE

## 2024-12-30 DIAGNOSIS — R29.898 LEG WEAKNESS, BILATERAL: Primary | ICD-10-CM

## 2024-12-30 PROCEDURE — 97112 NEUROMUSCULAR REEDUCATION: CPT | Mod: GP

## 2024-12-30 PROCEDURE — 97110 THERAPEUTIC EXERCISES: CPT | Mod: GP

## 2025-01-01 DIAGNOSIS — F41.9 ANXIETY: ICD-10-CM

## 2025-01-02 RX ORDER — VENLAFAXINE 75 MG/1
TABLET ORAL
Qty: 180 TABLET | Refills: 1 | Status: SHIPPED | OUTPATIENT
Start: 2025-01-02

## 2025-01-03 SDOH — HEALTH STABILITY: PHYSICAL HEALTH: ON AVERAGE, HOW MANY MINUTES DO YOU ENGAGE IN EXERCISE AT THIS LEVEL?: 30 MIN

## 2025-01-03 SDOH — HEALTH STABILITY: PHYSICAL HEALTH: ON AVERAGE, HOW MANY DAYS PER WEEK DO YOU ENGAGE IN MODERATE TO STRENUOUS EXERCISE (LIKE A BRISK WALK)?: 5 DAYS

## 2025-01-03 ASSESSMENT — SOCIAL DETERMINANTS OF HEALTH (SDOH): HOW OFTEN DO YOU GET TOGETHER WITH FRIENDS OR RELATIVES?: MORE THAN THREE TIMES A WEEK

## 2025-01-06 ENCOUNTER — THERAPY VISIT (OUTPATIENT)
Dept: PHYSICAL THERAPY | Facility: CLINIC | Age: 85
End: 2025-01-06
Payer: MEDICARE

## 2025-01-06 DIAGNOSIS — R26.2 DIFFICULTY WALKING: ICD-10-CM

## 2025-01-06 DIAGNOSIS — R29.898 LEG WEAKNESS, BILATERAL: Primary | ICD-10-CM

## 2025-01-06 DIAGNOSIS — R26.89 IMPAIRMENT OF BALANCE: ICD-10-CM

## 2025-01-06 PROCEDURE — 97112 NEUROMUSCULAR REEDUCATION: CPT | Mod: GP | Performed by: PHYSICAL THERAPIST

## 2025-01-06 PROCEDURE — 97110 THERAPEUTIC EXERCISES: CPT | Mod: GP | Performed by: PHYSICAL THERAPIST

## 2025-01-08 ENCOUNTER — OFFICE VISIT (OUTPATIENT)
Dept: FAMILY MEDICINE | Facility: CLINIC | Age: 85
End: 2025-01-08
Payer: MEDICARE

## 2025-01-08 VITALS
RESPIRATION RATE: 20 BRPM | SYSTOLIC BLOOD PRESSURE: 146 MMHG | HEART RATE: 62 BPM | BODY MASS INDEX: 36.49 KG/M2 | TEMPERATURE: 98 F | DIASTOLIC BLOOD PRESSURE: 70 MMHG | WEIGHT: 269.4 LBS | HEIGHT: 72 IN | OXYGEN SATURATION: 94 %

## 2025-01-08 DIAGNOSIS — E11.9 TYPE 2 DIABETES MELLITUS WITHOUT COMPLICATION, WITHOUT LONG-TERM CURRENT USE OF INSULIN (H): ICD-10-CM

## 2025-01-08 DIAGNOSIS — E03.9 HYPOTHYROIDISM, UNSPECIFIED TYPE: ICD-10-CM

## 2025-01-08 DIAGNOSIS — E78.5 HYPERLIPIDEMIA LDL GOAL <70: ICD-10-CM

## 2025-01-08 DIAGNOSIS — I10 PRIMARY HYPERTENSION: ICD-10-CM

## 2025-01-08 DIAGNOSIS — F41.9 ANXIETY: ICD-10-CM

## 2025-01-08 DIAGNOSIS — I50.32 CHRONIC HEART FAILURE WITH PRESERVED EJECTION FRACTION (H): ICD-10-CM

## 2025-01-08 DIAGNOSIS — D32.0 BENIGN NEOPLASM OF CEREBRAL MENINGES (H): ICD-10-CM

## 2025-01-08 DIAGNOSIS — H61.22 IMPACTED CERUMEN OF LEFT EAR: ICD-10-CM

## 2025-01-08 DIAGNOSIS — I82.4Z9 DEEP VEIN THROMBOSIS (DVT) OF DISTAL VEIN OF LOWER EXTREMITY, UNSPECIFIED CHRONICITY, UNSPECIFIED LATERALITY (H): ICD-10-CM

## 2025-01-08 DIAGNOSIS — I50.9 CHRONIC HEART FAILURE, UNSPECIFIED HEART FAILURE TYPE (H): ICD-10-CM

## 2025-01-08 DIAGNOSIS — Z00.00 MEDICARE ANNUAL WELLNESS VISIT, SUBSEQUENT: Primary | ICD-10-CM

## 2025-01-08 DIAGNOSIS — E66.01 MORBID OBESITY (H): ICD-10-CM

## 2025-01-08 DIAGNOSIS — I49.5 SICK SINUS SYNDROME (H): ICD-10-CM

## 2025-01-08 DIAGNOSIS — Z95.0 CARDIAC PACEMAKER IN SITU: ICD-10-CM

## 2025-01-08 DIAGNOSIS — I48.0 PAROXYSMAL A-FIB (H): ICD-10-CM

## 2025-01-08 DIAGNOSIS — N18.31 CHRONIC KIDNEY DISEASE, STAGE 3A (H): ICD-10-CM

## 2025-01-08 PROBLEM — F10.21 ALCOHOL DEPENDENCE IN REMISSION (H): Status: RESOLVED | Noted: 2023-06-19 | Resolved: 2025-01-08

## 2025-01-08 LAB
ALBUMIN SERPL BCG-MCNC: 4.2 G/DL (ref 3.5–5.2)
ALP SERPL-CCNC: 129 U/L (ref 40–150)
ALT SERPL W P-5'-P-CCNC: 13 U/L (ref 0–70)
ANION GAP SERPL CALCULATED.3IONS-SCNC: 10 MMOL/L (ref 7–15)
AST SERPL W P-5'-P-CCNC: 18 U/L (ref 0–45)
BILIRUB SERPL-MCNC: 0.6 MG/DL
BUN SERPL-MCNC: 14.5 MG/DL (ref 8–23)
CALCIUM SERPL-MCNC: 9.6 MG/DL (ref 8.8–10.4)
CHLORIDE SERPL-SCNC: 105 MMOL/L (ref 98–107)
CHOLEST SERPL-MCNC: 116 MG/DL
CREAT SERPL-MCNC: 0.86 MG/DL (ref 0.67–1.17)
EGFRCR SERPLBLD CKD-EPI 2021: 85 ML/MIN/1.73M2
ERYTHROCYTE [DISTWIDTH] IN BLOOD BY AUTOMATED COUNT: 13.8 % (ref 10–15)
FASTING STATUS PATIENT QL REPORTED: YES
FASTING STATUS PATIENT QL REPORTED: YES
GLUCOSE SERPL-MCNC: 92 MG/DL (ref 70–99)
HCO3 SERPL-SCNC: 28 MMOL/L (ref 22–29)
HCT VFR BLD AUTO: 40.2 % (ref 40–53)
HDLC SERPL-MCNC: 48 MG/DL
HGB BLD-MCNC: 12.5 G/DL (ref 13.3–17.7)
LDLC SERPL CALC-MCNC: 56 MG/DL
MCH RBC QN AUTO: 32.1 PG (ref 26.5–33)
MCHC RBC AUTO-ENTMCNC: 31.1 G/DL (ref 31.5–36.5)
MCV RBC AUTO: 103 FL (ref 78–100)
NONHDLC SERPL-MCNC: 68 MG/DL
NT-PROBNP SERPL-MCNC: 267 PG/ML (ref 0–1800)
PLATELET # BLD AUTO: 164 10E3/UL (ref 150–450)
POTASSIUM SERPL-SCNC: 4.4 MMOL/L (ref 3.4–5.3)
PROT SERPL-MCNC: 7 G/DL (ref 6.4–8.3)
RBC # BLD AUTO: 3.9 10E6/UL (ref 4.4–5.9)
SODIUM SERPL-SCNC: 143 MMOL/L (ref 135–145)
T4 FREE SERPL-MCNC: 1.15 NG/DL (ref 0.9–1.7)
TRIGL SERPL-MCNC: 62 MG/DL
TSH SERPL DL<=0.005 MIU/L-ACNC: 6.5 UIU/ML (ref 0.3–4.2)
WBC # BLD AUTO: 7.9 10E3/UL (ref 4–11)

## 2025-01-08 RX ORDER — LISINOPRIL 20 MG/1
20 TABLET ORAL DAILY
Qty: 90 TABLET | Refills: 0 | Status: SHIPPED | OUTPATIENT
Start: 2025-01-08

## 2025-01-08 ASSESSMENT — PAIN SCALES - GENERAL: PAINLEVEL_OUTOF10: MODERATE PAIN (4)

## 2025-01-08 NOTE — PROGRESS NOTES
Preventive Care Visit  [facility]  Anne Heath MD, Internal Medicine  Jan 8, 2025      Assessment & Plan   Problem List Items Addressed This Visit       Morbid obesity (H)    HTN (hypertension)    Relevant Medications    lisinopril (ZESTRIL) 20 MG tablet    Other Relevant Orders    Comprehensive metabolic panel (BMP + Alb, Alk Phos, ALT, AST, Total. Bili, TP) (Completed)    CBC with platelets (Completed)    Hypothyroidism    Relevant Orders    TSH with free T4 reflex (Completed)    T4 free (Completed)    Paroxysmal A-fib (H)    Relevant Medications    rivaroxaban ANTICOAGULANT (XARELTO ANTICOAGULANT) 20 MG TABS tablet    Other Relevant Orders    Comprehensive metabolic panel (BMP + Alb, Alk Phos, ALT, AST, Total. Bili, TP) (Completed)    CBC with platelets (Completed)    (HFpEF) heart failure with preserved ejection fraction (H)    Cardiac pacemaker in situ    Anxiety    Deep vein thrombosis (DVT) of distal vein of lower extremity, unspecified chronicity, unspecified laterality (H)    Relevant Medications    rivaroxaban ANTICOAGULANT (XARELTO ANTICOAGULANT) 20 MG TABS tablet    Type 2 diabetes mellitus without complication, without long-term current use of insulin (H)    Chronic kidney disease, stage 3a (H)    Benign neoplasm of cerebral meninges (H)    Sick sinus syndrome (H)     Other Visit Diagnoses       Medicare annual wellness visit, subsequent    -  Primary    Chronic heart failure, unspecified heart failure type (H)        Relevant Orders    BNP-N terminal pro (Completed)    Hyperlipidemia LDL goal <70        Relevant Orders    Lipid panel reflex to direct LDL Fasting (Completed)    Impacted cerumen of left ear        Relevant Orders    REMOVE IMPACTED CERUMEN (Completed)           Using the machine for lymphedema treatment.  Working with PT.    Started on lisinopril 20 mg 1 tablet once daily.  Blood pressure elevated/uncontrolled  Refill given on medications.  Repeat labs  today.  Continue work with PT for lymphedema as well as for balance issues.  Continue follow-up with cardiology.  Hemodynamics are stable, Further recommendation pending lab results.  He does not do any more screening test.  All questions answered.  Vaccines are up-to-date.      Patient has been advised of split billing requirements and indicates understanding: Yes       BMI  Estimated body mass index is 36.54 kg/m  as calculated from the following:    Height as of this encounter: 1.829 m (6').    Weight as of this encounter: 122.2 kg (269 lb 6.4 oz).   Weight management plan: Discussed healthy diet and exercise guidelines    Counseling  Appropriate preventive services were addressed with this patient via screening, questionnaire, or discussion as appropriate for fall prevention, nutrition, physical activity, Tobacco-use cessation, social engagement, weight loss and cognition.  Checklist reviewing preventive services available has been given to the patient.  Reviewed patient's diet, addressing concerns and/or questions.   The patient was provided with written information regarding signs of hearing loss.     Work on weight loss  Regular exercise  See Patient Instructions      Vernell Zamorano is a 84 year old, presenting for the following:  Physical and Breathing Problem        1/8/2025     9:01 AM   Additional Questions   Roomed by BENJY REINOSO    Doing PT for lymphedema.    Left knee is acting up with exercise.,  Requested knee brace.    Blood pressure is on the high side ranging 150 to160 at home.      Up-to-date on vaccines.    No blood in the stools.  Not doing colon cancer screening.      Taking levothyroxine once daily except to on Friday.  Last labs in November 2024, potassium was low, TSH elevated.    He has done the stool fit test was negative.    Doing BiPAP with oxygen.  Still having phlegm at night; clear phlegm.    Furosemide 20 mg twice a day ,diltiazem once a day 300 milligram.  Has not  been taking metoprolol.  Was on lisinopril, was discontinued.      Uses a cane for ambulation.  Working with PT on balance.    No choking with food.    Memory is good.    Last DVT was in 2012.    Has pacemaker battery changed, last year.    Voiding has been good.  His anxiety is stable, has been on Effexor, is helping.  Last EKG in October 2024, QT was normal.    Doing shopping,, helps daughter  shopping and  bank procedures; daughter has bipolar,     Health Care Directive  Patient does not have a Health Care Directive: Patient states has Advance Directive and will bring in a copy to clinic.      1/3/2025   General Health   How would you rate your overall physical health? (!) FAIR   Feel stress (tense, anxious, or unable to sleep) Only a little   (!) STRESS CONCERN      1/3/2025   Nutrition   Diet: Vegetarian/vegan         1/3/2025   Exercise   Days per week of moderate/strenous exercise 5 days   Average minutes spent exercising at this level 30 min         1/3/2025   Social Factors   Frequency of gathering with friends or relatives More than three times a week   Worry food won't last until get money to buy more No   Food not last or not have enough money for food? No   Do you have housing? (Housing is defined as stable permanent housing and does not include staying ouside in a car, in a tent, in an abandoned building, in an overnight shelter, or couch-surfing.) Yes   Are you worried about losing your housing? No   Lack of transportation? No   Unable to get utilities (heat,electricity)? No         1/8/2025   Fall Risk   Gait Speed Test (Document in seconds) 5.27   Gait Speed Test Interpretation Greater than 5.01 seconds - ABNORMAL          1/3/2025   Activities of Daily Living- Home Safety   Needs help with the following daily activites None of the above   Safety concerns in the home None of the above         1/3/2025   Dental   Dentist two times every year? Yes         1/3/2025   Hearing Screening   Hearing  concerns? (!) IT'S HARD TO FOLLOW A CONVERSATION IN A NOISY RESTAURANT OR CROWDED ROOM.    (!) TROUBLE UNDESTANDING A SPEAKER IN A PUBLIC MEETING OR [employer] SERVICE.    (!) TROUBLE UNDERSTANDING SOFT OR WHISPERED SPEECH.       Multiple values from one day are sorted in reverse-chronological order         1/3/2025   Driving Risk Screening   Patient/family members have concerns about driving No         1/3/2025   General Alertness/Fatigue Screening   Have you been more tired than usual lately? No         1/3/2025   Urinary Incontinence Screening   Bothered by leaking urine in past 6 months No            Today's PHQ-2 Score:       2025     8:39 AM   PHQ-2 (  [employer])   Q1: Little interest or pleasure in doing things 0   Q2: Feeling down, depressed or hopeless 0   PHQ-2 Score 0    Q1: Little interest or pleasure in doing things Not at all   Q2: Feeling down, depressed or hopeless Not at all   PHQ-2 Score 0       Patient-reported           1/3/2025   Substance Use   Alcohol more than 3/day or more than 7/wk No   Do you have a current opioid prescription? No   How severe/bad is pain from 1 to 10? 3/10   Do you use any other substances recreationally? No     Social History     Tobacco Use    Smoking status: Former     Current packs/day: 0.00     Average packs/day: 0.5 packs/day for 30.0 years (15.0 ttl pk-yrs)     Types: Cigarettes     Start date: 6/15/1959     Quit date: 1989     Years since quittin.5     Passive exposure: Past    Smokeless tobacco: Never    Tobacco comments:     smoked from  to    Vaping Use    Vaping status: Never Used   Substance Use Topics    Alcohol use: No     Comment: quit in ; recovering    Drug use: No                 Reviewed and updated as needed this visit by Provider      Problems               Past Medical History:   Diagnosis Date    (HFpEF) heart failure with preserved ejection fraction (H)     Alcohol dependence in remission (H)     in recovery since      Arthritis     BPH with urinary obstruction     CAD (coronary artery disease)     PCI w/ SUMMER in 2014    Cardiac pacemaker in situ     Chronic diastolic heart failure (H)     Complication of anesthesia     post-op DVT 2012 after knee surgery    COPD (chronic obstructive pulmonary disease) (H)     Diabetes (H)     DVT (deep venous thrombosis) (H)     Dyspnea     Essential hypertension     Fatigue     HLD (hyperlipidemia)     Hx of long term use of blood thinners     Due to PE. Started in 2012    Hypothyroidism     Lymphedema     Mumps     Obesity, Class III, BMI 40-49.9 (morbid obesity) (H)     RIN (obstructive sleep apnea)     has refused CPAP    Pacemaker     Paroxysmal A-fib (H)     PE (pulmonary thromboembolism) (H)     Status post coronary angiogram 11/05/2019     Past Surgical History:   Procedure Laterality Date    cardiac stenting      COLONOSCOPY  2010    CV RIGHT HEART CATH MEASUREMENTS RECORDED N/A 11/05/2019    Procedure: Right Heart Cath;  Surgeon: Roberto Hernandez MD;  Location: [facility] CARDIAC CATH LAB    ENT SURGERY      tonsillectomy    EP PACEMAKER GENERATOR REPLACEMENT- DUAL N/A 9/23/2024    Procedure: Pacemaker Generator Replacement Dual;  Surgeon: Densi Perez MD;  Location: [facility] CARDIAC CATH LAB    EYE SURGERY  2012    IMPLANT PACEMAKER      INJECT NERVE BLOCK SUPRASCAPULAR Bilateral 08/26/2021    Procedure: BLOCK, NERVE, SUPRASCAPULAR, Bilateral, under ultrasound guidance;  Surgeon: Swetha Allen MD;  Location: [facility] OR    INJECT NERVE BLOCK SUPRASCAPULAR Bilateral 09/23/2021    Procedure: bilateral suprascapular nerve block;  Surgeon: Swetha Allen MD;  Location: [facility] OR    INJECT NERVE BLOCK SUPRASCAPULAR Left 09/15/2022    Procedure: BLOCK, NERVE, SUPRASCAPULAR- left;  Surgeon: Swteha Allen MD;  Location: [facility] OR    INJECT NERVE BLOCK SUPRASCAPULAR Left 10/20/2022    Procedure: BLOCK, NERVE, SUPRASCAPULAR- left;  Surgeon: Swetha Allen MD;  Location: [facility] OR    INJECT  NERVE BLOCK SUPRASCAPULAR Left 01/05/2023    Procedure: BLOCK, NERVE, SUPRASCAPULAR (left);  Surgeon: Swetha Allen MD;  Location: [facility] OR    NERVE BLOCK PERIPHERAL Bilateral 03/10/2022    Procedure: bilateral suprascapular nerve block;  Surgeon: Swetha Allen MD;  Location: [facility] OR    ORTHOPEDIC SURGERY Right 2012    knee replacement    REVERSE ARTHROPLASTY SHOULDER Left 02/21/2023    Procedure: ARTHROPLASTY, SHOULDER, TOTAL, REVERSE LEFT;  Surgeon: Katelyn Vasquez MD;  Location:  OR    VASCULAR SURGERY  2022     Lab work is in process  Labs reviewed in EPIC  BP Readings from Last 3 Encounters:   01/08/25 (!) 146/70   12/20/24 (!) 151/80   11/26/24 (!) 140/80    Wt Readings from Last 3 Encounters:   01/08/25 122.2 kg (269 lb 6.4 oz)   12/20/24 119.7 kg (263 lb 14.4 oz)   11/26/24 118.9 kg (262 lb 3.2 oz)                  Patient Active Problem List   Diagnosis    Morbid obesity (H)    PE (pulmonary thromboembolism) (H)    HTN (hypertension)    Chronic diastolic heart failure (H)    Dyspnea    RIN (obstructive sleep apnea)    Hypothyroidism    Paroxysmal A-fib (H)    Lymphedema    (HFpEF) heart failure with preserved ejection fraction (H)    Essential hypertension    Cardiac pacemaker in situ    DVT (deep venous thrombosis) (H)    Coronary artery disease involving native coronary artery with angina pectoris, unspecified whether native or transplanted heart    Obesity, Class III, BMI 40-49.9 (morbid obesity) (H)    Muscle tension headache    Heart failure with preserved ejection fraction, unspecified HF chronicity (H)    Anxiety    Deep vein thrombosis (DVT) of distal vein of lower extremity, unspecified chronicity, unspecified laterality (H)    Type 2 diabetes mellitus without complication, without long-term current use of insulin (H)    Chronic kidney disease, stage 3a (H)    Chronic right shoulder pain    Long term current use of anticoagulants with INR goal of 2.0-3.0    Benign neoplasm of cerebral  meninges (H)    Ankle injury, right, initial encounter    Hypokalemia    Hypoxia    Pneumonia of left lung due to infectious organism, unspecified part of lung    Benign prostatic hyperplasia without lower urinary tract symptoms    History of coronary artery stent placement    Hypoxemia    Insomnia    Personal history of other venous thrombosis and embolism    Personal history of pulmonary embolism    Pontine lesion    Prediabetes    Presence of left artificial shoulder joint    Sick sinus syndrome (H)    Slow transit constipation    Chronic diastolic (congestive) heart failure (H)    Sleep apnea    MARTÍNEZ (dyspnea on exertion)    Elevated troponin    DONA (acute kidney injury)    Orthostatic dizziness     Past Surgical History:   Procedure Laterality Date    cardiac stenting      COLONOSCOPY  2010    CV RIGHT HEART CATH MEASUREMENTS RECORDED N/A 11/05/2019    Procedure: Right Heart Cath;  Surgeon: Roberto Hernandez MD;  Location:  [facility]ART CARDIAC CATH LAB    ENT SURGERY      tonsillectomy    EP PACEMAKER GENERATOR REPLACEMENT- DUAL N/A 9/23/2024    Procedure: Pacemaker Generator Replacement Dual;  Surgeon: Denis Perez MD;  Location: [facility] CARDIAC CATH LAB    EYE SURGERY  2012    IMPLANT PACEMAKER      INJECT NERVE BLOCK SUPRASCAPULAR Bilateral 08/26/2021    Procedure: BLOCK, NERVE, SUPRASCAPULAR, Bilateral, under ultrasound guidance;  Surgeon: Swetha Allen MD;  Location: [facility] OR    INJECT NERVE BLOCK SUPRASCAPULAR Bilateral 09/23/2021    Procedure: bilateral suprascapular nerve block;  Surgeon: Swetha Allen MD;  Location: [facility] OR    INJECT NERVE BLOCK SUPRASCAPULAR Left 09/15/2022    Procedure: BLOCK, NERVE, SUPRASCAPULAR- left;  Surgeon: Swetha Allen MD;  Location: [facility] OR    INJECT NERVE BLOCK SUPRASCAPULAR Left 10/20/2022    Procedure: BLOCK, NERVE, SUPRASCAPULAR- left;  Surgeon: Swetha Allen MD;  Location: [facility] OR    INJECT NERVE BLOCK SUPRASCAPULAR Left 01/05/2023    Procedure: BLOCK, NERVE,  SUPRASCAPULAR (left);  Surgeon: Swetha Allen MD;  Location: [facility] OR    NERVE BLOCK PERIPHERAL Bilateral 03/10/2022    Procedure: bilateral suprascapular nerve block;  Surgeon: Swetha Allen MD;  Location: [facility] OR    ORTHOPEDIC SURGERY Right 2012    knee replacement    REVERSE ARTHROPLASTY SHOULDER Left 2023    Procedure: ARTHROPLASTY, SHOULDER, TOTAL, REVERSE LEFT;  Surgeon: Katelyn Vasquez MD;  Location: [facility] OR    VASCULAR SURGERY         Social History     Tobacco Use    Smoking status: Former     Current packs/day: 0.00     Average packs/day: 0.5 packs/day for 30.0 years (15.0 ttl pk-yrs)     Types: Cigarettes     Start date: 6/15/1959     Quit date: 1989     Years since quittin.5     Passive exposure: Past    Smokeless tobacco: Never    Tobacco comments:     smoked from  to    Substance Use Topics    Alcohol use: No     Comment: quit in ; recovering     Family History   Problem Relation Age of Onset    Brain Cancer Mother     Other Cancer Mother     Anxiety Disorder Mother     Cerebrovascular Disease Father     Hypertension Father     Obesity Father     Substance Abuse Son     Osteoporosis Sister     Thyroid Disease Sister     Anesthesia Reaction No family hx of     Thrombosis No family hx of          Current Outpatient Medications   Medication Sig Dispense Refill    acetaminophen (TYLENOL) 500 MG tablet Take 1,000 mg by mouth 3 times daily as needed for mild pain.      albuterol (PROAIR HFA/PROVENTIL HFA/VENTOLIN HFA) 108 (90 Base) MCG/ACT inhaler Inhale 2 puffs into the lungs every 6 hours as needed for shortness of breath, wheezing or cough. 18 g 1    aspirin (ASA) 81 MG chewable tablet Take 1 tablet by mouth daily      atorvastatin (LIPITOR) 80 MG tablet Take 1 tablet (80 mg) by mouth every evening. 90 tablet 3    diltiazem ER COATED BEADS (CARDIZEM CD/CARTIA XT) 300 MG 24 hr capsule Take 1 capsule (300 mg) by mouth daily 90 capsule 3    ferrous sulfate 45 MG TBCR  CR tablet Take 1 tablet (45 mg) by mouth every other day. 45 tablet 0    Fluticasone-Umeclidin-Vilanterol (TRELEGY ELLIPTA) 200-62.5-25 MCG/ACT oral inhaler INHALE 1 PUFF INTO THE LUNGS DAILY 60 each 3    furosemide (LASIX) 40 MG tablet Take 0.5 tablets (20 mg) by mouth 2 times daily. 90 tablet 2    levothyroxine (SYNTHROID/LEVOTHROID) 175 MCG tablet Take 1 tablet (175 mcg) by mouth six times a week. 2 tablet (350 mcg) on Fridays 102 tablet 1    lisinopril (ZESTRIL) 20 MG tablet Take 1 tablet (20 mg) by mouth daily. 90 tablet 0    metolazone (ZAROXOLYN) 2.5 MG tablet Take 1 tablet (2.5 mg) by mouth every other day. Taken 30 minutes prior to furosemide 45 tablet 0    Multiple vitamin TABS Take 1 tablet by mouth daily      polyethylene glycol (MIRALAX) 17 g packet Take 17 g by mouth daily 7 packet 0    potassium chloride cammy ER (KLOR-CON M10) 10 MEQ CR tablet Take 1 tablet (10 mEq) by mouth 2 times daily. 180 tablet 1    rivaroxaban ANTICOAGULANT (XARELTO ANTICOAGULANT) 20 MG TABS tablet Take 1 tablet (20 mg) by mouth daily (with dinner). 90 tablet 1    tamsulosin (FLOMAX) 0.4 MG capsule Take 1 capsule (0.4 mg) by mouth daily. 90 capsule 0    venlafaxine (EFFEXOR) 75 MG tablet TAKE 1 TABLET(75 MG) BY MOUTH TWICE DAILY 180 tablet 1     No Known Allergies  Recent Labs   Lab Test 01/08/25  0953 11/11/24  0837 10/30/24  1549 10/19/24  0647 10/18/24  1248 02/07/24  1038 01/31/24  1026 01/12/24  1155 12/15/23  0822 08/31/21  1401 06/21/21  1454 06/14/21  1455   A1C  --   --  5.7*  --   --   --   --  6.0* 5.8*   < > 6.1*  --    LDL 56  --   --   --   --   --  50  --  57   < >  --   --    HDL 48  --   --   --   --   --  57  --  47   < >  --   --    TRIG 62  --   --   --   --   --  104  --  156*   < >  --   --    ALT 13  --  28  --  26   < > 23 18  --    < >  --   --    CR 0.86 1.04 1.20*   < > 1.39*   < > 1.14 1.12 1.25*   < > 1.57* 1.81*   GFRESTIMATED 85 70 59*   < > 50*   < > 63 65 57*   < > 40* 34*   GFRESTBLACK  --   --    --   --   --   --   --   --   --   --  47* 39*   POTASSIUM 4.4 3.0* 3.5   < > 4.1   < > 4.1 4.4 4.2   < > 4.1 4.7   TSH 6.50* 34.60* 81.70*  --   --    < >  --  0.56 1.12   < >  --  0.71    < > = values in this interval not displayed.      Current providers sharing in care for this patient include:  Patient Care Team:  Anne Heath MD as PCP - General (Internal Medicine)  Kassi Glez, PharmD as Pharmacist (Pharmacist)  Sebastian Holland MD as MD (Urology)  Tiffany Suero NP as Assigned Nephrology Provider  Anne Heath MD as Assigned PCP  Swetha Allen MD as MD (Anesthesiology)  Kassi Glez, PharmD as Assigned MTM Pharmacist  Monty Pardo MD as Assigned Heart and Vascular Provider  Levy Cheng APRN CNP as Nurse Practitioner (Urology)  Levy Cheng APRN CNP as Assigned Surgical Provider  Nicole Haro PA-C as Physician Assistant (Pulmonary Disease)  Cl Gross MD as Assigned Neuroscience Provider  Coral Son MD as Assigned Infectious Disease Provider    The following health maintenance items are reviewed in Epic and correct as of today:  Health Maintenance   Topic Date Due    HF ACTION PLAN  Never done    URINE DRUG SCREEN  09/20/2022    DIABETIC FOOT EXAM  12/15/2024    A1C  01/30/2025    EYE EXAM  04/26/2025    BMP  07/08/2025    MICROALBUMIN  11/11/2025    MEDICARE ANNUAL WELLNESS VISIT  01/08/2026    ALT  01/08/2026    LIPID  01/08/2026    ANNUAL REVIEW OF HM ORDERS  01/08/2026    FALL RISK ASSESSMENT  01/08/2026    CBC  01/08/2026    HEMOGLOBIN  01/08/2026    ADVANCE CARE PLANNING  01/08/2030    DTAP/TDAP/TD IMMUNIZATION (4 - Td or Tdap) 04/19/2033    TSH W/FREE T4 REFLEX  Completed    SPIROMETRY  Completed    COPD ACTION PLAN  Completed    PHQ-2 (once per calendar year)  Completed    INFLUENZA VACCINE  Completed    Pneumococcal Vaccine: 50+ Years  Completed    URINALYSIS  Completed    RSV VACCINE  Completed    COVID-19  Vaccine  Completed    HPV IMMUNIZATION  Aged Out    MENINGITIS IMMUNIZATION  Aged Out    RSV MONOCLONAL ANTIBODY  Aged Out    ZOSTER IMMUNIZATION  Discontinued         Review of Systems  Constitutional, neuro, ENT, endocrine, pulmonary, cardiac, gastrointestinal, genitourinary, musculoskeletal, integument and psychiatric systems are negative, except as otherwise noted.     Objective    Exam  BP (!) 146/70   Pulse 62   Temp 98  F (36.7  C) (Temporal)   Resp 20   Ht 1.829 m (6')   Wt 122.2 kg (269 lb 6.4 oz)   SpO2 94%   BMI 36.54 kg/m     Estimated body mass index is 36.54 kg/m  as calculated from the following:    Height as of this encounter: 1.829 m (6').    Weight as of this encounter: 122.2 kg (269 lb 6.4 oz).    Physical Exam  GENERAL: alert and no distress, obese, pleasant  EYES: Eyes grossly normal to inspection, PERRL and conjunctivae and sclerae normal  HENT: ear canals and TM's normal, nose and mouth without ulcers or lesions, some wax in the left ear canal  NECK: no adenopathy, no asymmetry, masses, or scars  RESP: lungs clear to auscultation - no rales, rhonchi or wheezes  CV: regular rate and rhythm, normal S1 S2, no S3 or S4, no murmur, click or rub, no peripheral edema  ABDOMEN: soft, nontender, no hepatosplenomegaly, no masses and bowel sounds normal  MS: no gross musculoskeletal defects noted, significant bilateral lower extremity edema, has wraps on.  SKIN: no suspicious lesions or rashes, some superficial ecchymosis on extensor surface of upper extremities, dry skin.  NEURO: Normal strength and tone, mentation intact and speech normal  PSYCH: mentation appears normal, affect normal/bright         1/8/2025   Mini Cog   Clock Draw Score 2 Normal   3 Item Recall 3 objects recalled   Mini Cog Total Score 5              Signed Electronically by: Anne Heath MD

## 2025-01-08 NOTE — PATIENT INSTRUCTIONS
We hope you had an excellent experience today. You may receive an e-mail from [employer] within the next few days inviting you to participate in an online patient experience survey. I value your experience and would be very thankful for your time with providing feedback on our clinic survey.

## 2025-01-10 ENCOUNTER — ANCILLARY PROCEDURE (OUTPATIENT)
Dept: CARDIOLOGY | Facility: CLINIC | Age: 85
End: 2025-01-10
Attending: INTERNAL MEDICINE
Payer: MEDICARE

## 2025-01-10 DIAGNOSIS — Z95.0 CARDIAC PACEMAKER IN SITU: ICD-10-CM

## 2025-01-10 DIAGNOSIS — I49.5 SICK SINUS SYNDROME (H): ICD-10-CM

## 2025-01-10 PROCEDURE — 93294 REM INTERROG EVL PM/LDLS PM: CPT | Performed by: INTERNAL MEDICINE

## 2025-01-10 PROCEDURE — 93296 REM INTERROG EVL PM/IDS: CPT | Performed by: INTERNAL MEDICINE

## 2025-01-13 ENCOUNTER — THERAPY VISIT (OUTPATIENT)
Dept: PHYSICAL THERAPY | Facility: CLINIC | Age: 85
End: 2025-01-13
Payer: MEDICARE

## 2025-01-13 DIAGNOSIS — M17.12 PRIMARY OSTEOARTHRITIS OF LEFT KNEE: Primary | ICD-10-CM

## 2025-01-13 DIAGNOSIS — R26.2 DIFFICULTY WALKING: ICD-10-CM

## 2025-01-13 DIAGNOSIS — R29.898 LEG WEAKNESS, BILATERAL: Primary | ICD-10-CM

## 2025-01-13 DIAGNOSIS — R26.89 BALANCE PROBLEM: ICD-10-CM

## 2025-01-13 DIAGNOSIS — R26.89 IMPAIRMENT OF BALANCE: ICD-10-CM

## 2025-01-13 LAB
MDC_IDC_EPISODE_DTM: NORMAL
MDC_IDC_EPISODE_DURATION: 5 S
MDC_IDC_EPISODE_ID: NORMAL
MDC_IDC_EPISODE_TYPE: NORMAL
MDC_IDC_LEAD_CONNECTION_STATUS: NORMAL
MDC_IDC_LEAD_CONNECTION_STATUS: NORMAL
MDC_IDC_LEAD_IMPLANT_DT: NORMAL
MDC_IDC_LEAD_IMPLANT_DT: NORMAL
MDC_IDC_LEAD_LOCATION: NORMAL
MDC_IDC_LEAD_LOCATION: NORMAL
MDC_IDC_LEAD_LOCATION_DETAIL_1: NORMAL
MDC_IDC_LEAD_LOCATION_DETAIL_1: NORMAL
MDC_IDC_LEAD_MFG: NORMAL
MDC_IDC_LEAD_MFG: NORMAL
MDC_IDC_LEAD_MODEL: NORMAL
MDC_IDC_LEAD_MODEL: NORMAL
MDC_IDC_LEAD_POLARITY_TYPE: NORMAL
MDC_IDC_LEAD_POLARITY_TYPE: NORMAL
MDC_IDC_LEAD_SERIAL: NORMAL
MDC_IDC_LEAD_SERIAL: NORMAL
MDC_IDC_MSMT_BATTERY_DTM: NORMAL
MDC_IDC_MSMT_BATTERY_REMAINING_LONGEVITY: 174 MO
MDC_IDC_MSMT_BATTERY_REMAINING_PERCENTAGE: 100 %
MDC_IDC_MSMT_BATTERY_STATUS: NORMAL
MDC_IDC_MSMT_LEADCHNL_RA_IMPEDANCE_VALUE: 397 OHM
MDC_IDC_MSMT_LEADCHNL_RA_PACING_THRESHOLD_AMPLITUDE: 0.4 V
MDC_IDC_MSMT_LEADCHNL_RA_PACING_THRESHOLD_PULSEWIDTH: 0.4 MS
MDC_IDC_MSMT_LEADCHNL_RV_IMPEDANCE_VALUE: 399 OHM
MDC_IDC_PG_IMPLANT_DTM: NORMAL
MDC_IDC_PG_MFG: NORMAL
MDC_IDC_PG_MODEL: NORMAL
MDC_IDC_PG_SERIAL: NORMAL
MDC_IDC_PG_TYPE: NORMAL
MDC_IDC_SESS_CLINIC_NAME: NORMAL
MDC_IDC_SESS_DTM: NORMAL
MDC_IDC_SESS_TYPE: NORMAL
MDC_IDC_SET_BRADY_AT_MODE_SWITCH_MODE: NORMAL
MDC_IDC_SET_BRADY_AT_MODE_SWITCH_RATE: 150 {BEATS}/MIN
MDC_IDC_SET_BRADY_LOWRATE: 60 {BEATS}/MIN
MDC_IDC_SET_BRADY_MAX_SENSOR_RATE: 130 {BEATS}/MIN
MDC_IDC_SET_BRADY_MAX_TRACKING_RATE: 130 {BEATS}/MIN
MDC_IDC_SET_BRADY_MODE: NORMAL
MDC_IDC_SET_BRADY_PAV_DELAY_HIGH: 220 MS
MDC_IDC_SET_BRADY_PAV_DELAY_LOW: 250 MS
MDC_IDC_SET_BRADY_SAV_DELAY_HIGH: 220 MS
MDC_IDC_SET_BRADY_SAV_DELAY_LOW: 250 MS
MDC_IDC_SET_LEADCHNL_RA_PACING_AMPLITUDE: 2 V
MDC_IDC_SET_LEADCHNL_RA_PACING_CAPTURE_MODE: NORMAL
MDC_IDC_SET_LEADCHNL_RA_PACING_POLARITY: NORMAL
MDC_IDC_SET_LEADCHNL_RA_PACING_PULSEWIDTH: 0.4 MS
MDC_IDC_SET_LEADCHNL_RA_SENSING_ADAPTATION_MODE: NORMAL
MDC_IDC_SET_LEADCHNL_RA_SENSING_POLARITY: NORMAL
MDC_IDC_SET_LEADCHNL_RA_SENSING_SENSITIVITY: 0.5 MV
MDC_IDC_SET_LEADCHNL_RV_PACING_AMPLITUDE: 2.6 V
MDC_IDC_SET_LEADCHNL_RV_PACING_CAPTURE_MODE: NORMAL
MDC_IDC_SET_LEADCHNL_RV_PACING_POLARITY: NORMAL
MDC_IDC_SET_LEADCHNL_RV_PACING_PULSEWIDTH: 1 MS
MDC_IDC_SET_LEADCHNL_RV_SENSING_ADAPTATION_MODE: NORMAL
MDC_IDC_SET_LEADCHNL_RV_SENSING_POLARITY: NORMAL
MDC_IDC_SET_LEADCHNL_RV_SENSING_SENSITIVITY: 1 MV
MDC_IDC_SET_ZONE_DETECTION_INTERVAL: 375 MS
MDC_IDC_SET_ZONE_STATUS: NORMAL
MDC_IDC_SET_ZONE_TYPE: NORMAL
MDC_IDC_SET_ZONE_VENDOR_TYPE: NORMAL
MDC_IDC_STAT_AT_BURDEN_PERCENT: 1 %
MDC_IDC_STAT_AT_DTM_END: NORMAL
MDC_IDC_STAT_AT_DTM_START: NORMAL
MDC_IDC_STAT_BRADY_DTM_END: NORMAL
MDC_IDC_STAT_BRADY_DTM_START: NORMAL
MDC_IDC_STAT_BRADY_RA_PERCENT_PACED: 26 %
MDC_IDC_STAT_BRADY_RV_PERCENT_PACED: 0 %
MDC_IDC_STAT_EPISODE_RECENT_COUNT: 0
MDC_IDC_STAT_EPISODE_RECENT_COUNT: 1
MDC_IDC_STAT_EPISODE_RECENT_COUNT: 2
MDC_IDC_STAT_EPISODE_RECENT_COUNT_DTM_END: NORMAL
MDC_IDC_STAT_EPISODE_RECENT_COUNT_DTM_START: NORMAL
MDC_IDC_STAT_EPISODE_TYPE: NORMAL
MDC_IDC_STAT_EPISODE_VENDOR_TYPE: NORMAL
MDC_IDC_STAT_EPISODE_VENDOR_TYPE: NORMAL

## 2025-01-13 PROCEDURE — 97112 NEUROMUSCULAR REEDUCATION: CPT | Mod: GP | Performed by: PHYSICAL THERAPIST

## 2025-01-14 ENCOUNTER — THERAPY VISIT (OUTPATIENT)
Dept: OCCUPATIONAL THERAPY | Facility: CLINIC | Age: 85
End: 2025-01-14
Payer: MEDICARE

## 2025-01-14 DIAGNOSIS — I89.0 LYMPHEDEMA: Primary | ICD-10-CM

## 2025-01-14 PROCEDURE — 97140 MANUAL THERAPY 1/> REGIONS: CPT | Mod: GO | Performed by: OCCUPATIONAL THERAPIST

## 2025-01-14 NOTE — PROGRESS NOTES
01/14/25 0500   Appointment Info   Treating Provider Jaylene Russo, OTR/L, CLT   Visits Used 10 - Medicare / [facility]   Medical Diagnosis Lymphedema   OT Tx Diagnosis Lymphedema   Progress Note/Certification   Start Of Care Date 11/12/24   Onset of Illness/Injury or Date of Surgery 07/05/23  (Chronic for years. First evaluated in clinic in 2023)   Therapy Frequency 1x/week   Predicted Duration Up to 10 weeks   Certification date from 01/14/25   Certification date to 03/24/25   Progress Note Due Date   (10th visit)   Progress Note Completed Date 01/14/25   Goals   OT Goals 1;2;3;4;5;6   OT Goal 1   Goal Identifier Education   Goal Description Pt will IND verbalize s/s lymphedema, infection, components CDT, importance of skin care to prevent infection, and when to seek medical attention due to exacerbation of symptoms.   Rationale In order to maximize safety and independence with performance of self-care activities   Goal Progress Goal met - Pt IND verbalizing s/s lymphedema, skin infection, tx techs, when to seek medical attention   Target Date 01/20/25   Date Met 11/25/24   OT Goal 2   Goal Identifier GCB   Goal Description Pt or caregiver will IND don/doff GCB and pt will tolerate GCB for 24 hrs to promote lymph flow and volume reduction for improved leg profile, functional mobility, and ADL/IADL IND.   Rationale In order to maximize safety and independence with ADL/IADLs   Goal Progress Goal Met - Pt is IND w/ compression garments, scheduled so that GCB applied in clinic, then doffed IND at home   Target Date 01/20/25   Date Met 12/02/24   OT Goal 3   Goal Identifier Home Program   Goal Description Pt will demonstrate IND completion of self-MLD and exercises to promote lymph mobility.   Rationale In order to maximize safety and independence with ADL/IADLs   Goal Progress Progressing - pt now has Entre lymph pump and reports he has used ~6 times, and contines to learn routine, best settings.   Target Date 03/24/25  "  OT Goal 4   Goal Identifier Garments   Goal Description Pt or caregiver will IND don/doff compression garments to promote/maintain reductions in limb volume for improved clothing fit, functional mobility, and ADL/IADL performance.   Rationale In order to maximize safety and independence with ADL/IADLs   Goal Progress Ongoing - pt is using velcro wraps w/ foot component, continues to benefit from edu in donning tech   Target Date 03/24/25   OT Goal 5   Goal Identifier Volume   Goal Description BLE volume will reduce by 500 ml for improved skin integrity, clothing fit, functional mobiliity, and ADL/IADL engagement.   Rationale In order to maximize safety and independence with ADL/IADLs   Goal Progress Ongoing - pt presenting w/ variable BLE volume   Target Date 03/24/25   OT Goal 6   Goal Identifier LLIS / Pain   Goal Description LLIS / Pain score will reduce by 7 points to demonstrate reduced lymphedema burden on ADL/IADL performance.   Rationale In order to maximize safety and independence with ADL/IADLs   Goal Progress Goal Ended - Entered in error   Subjective Report   Subjective Report Pt reporting he will be getting a new pair of boots that he ordered \"with lymphedema in mind\" Pt reporting pain in knee bothersome, is working w/ OP PT.   Manual Therapy   Manual Therapy Minutes (91264) 55   Manual Therapy 1 Pt arrived to clinic ambulating w/ SPC, w/ velcro wraps on foot and calf. Wraps minimally secured around feet, bunched around ankles. Pt presenting w/ L > R swelling, bulbous ankles. Skin intact with lipodermatosclerosis coloring and fibrosis, mildly dry around toes. Volume appears unchanged to slighty increased from prior. To promote lymph flow, OT performed MLD while pt supine: neck seq, abdo seq, cleared trav axillary LN and ing LN, performed stationary circles on BLE directing fluid proximally, away from affected areas, extended time at ankles an feet for fibrosis tech. Applied lotion to BLE to " promote/maintain skin integrity. OT applied modified quick wrap/velcro wrap for improved compression over ankles. Pt IND donned liner socks, felt double folded over anterior ankles to protect skin, feet/ankle wrapped w/ 8 cm SBB w/ extra fig 8 at ankle for increased compression. OT provided Total A to don calf component of velcro wrap. OT reinforcing education in donning tech to avoid bunching, encouraged pt to have assist for improved donning. OT educated pt in benefits of alternate velcro foot piece for improved compression and fit in shoe. OT printed page w/ image of recommended garment and fitter contact info. OT discussed POC, recommended ongoing weekly vs every other week appts, until new garments arrive, to maintain BLE volume.   Skilled Intervention education, MLD, GCB   Patient Response/Progress Pt verbalized understanding of all education, softening noted following MLD   Education   Learner/Method Patient;Listening;Reading;Demonstration;No Barriers to Learning   Education Comments POC, components CDT, use of compression garments/wear schedule, s/s worsening lymphedema and infection   Plan   Home program GCB, stockings, velcro garments, walking, skin cares   Updates to plan of care Pt has received Entre pump. New garment order for [employer] ReadyWrap Foot SL, pt encouraged to make appt w/ [facility].   Plan for next session Follow up Entre use, [employer] Ready[employer] SL order, MLD, GCB vs velcro wraps   Total Session Time   Timed Code Treatment Minutes 55   Total Treatment Time (sum of timed and untimed services) 55         [facility]                                                                                   OUTPATIENT OCCUPATIONAL THERAPY    PLAN OF TREATMENT FOR OUTPATIENT REHABILITATION   Patient's Last Name, First Name, Erich Monet YOB: 1940   Provider's Name   [facility]   Medical Record No.  [id number]     Onset Date:  07/05/23 (Chronic for years. First evaluated in clinic in 2023) Start of Care Date: 11/12/24     Medical Diagnosis:  Lymphedema      OT Treatment Diagnosis:  Lymphedema Plan of Treatment  Frequency/Duration:1x/week/Up to 10 weeks    Certification date from 01/14/25   To 03/24/25        See note for plan of treatment details and functional goals     Jaylene Russo, OTR                         I CERTIFY THE NEED FOR THESE SERVICES FURNISHED UNDER        THIS PLAN OF TREATMENT AND WHILE UNDER MY CARE     (Physician attestation of this document indicates review and certification of the therapy plan).              Referring Provider:  Monty Pardo    Initial Assessment  See Epic Evaluation- 11/12/24

## 2025-01-15 ENCOUNTER — ANCILLARY PROCEDURE (OUTPATIENT)
Dept: GENERAL RADIOLOGY | Facility: CLINIC | Age: 85
End: 2025-01-15
Attending: INTERNAL MEDICINE
Payer: MEDICARE

## 2025-01-15 DIAGNOSIS — M17.12 PRIMARY OSTEOARTHRITIS OF LEFT KNEE: ICD-10-CM

## 2025-01-15 PROCEDURE — 73562 X-RAY EXAM OF KNEE 3: CPT | Mod: TC | Performed by: RADIOLOGY

## 2025-01-20 ENCOUNTER — MYC REFILL (OUTPATIENT)
Dept: FAMILY MEDICINE | Facility: CLINIC | Age: 85
End: 2025-01-20

## 2025-01-20 DIAGNOSIS — E87.6 HYPOKALEMIA: ICD-10-CM

## 2025-01-21 ENCOUNTER — THERAPY VISIT (OUTPATIENT)
Dept: OCCUPATIONAL THERAPY | Facility: CLINIC | Age: 85
End: 2025-01-21
Payer: MEDICARE

## 2025-01-21 DIAGNOSIS — I89.0 LYMPHEDEMA: Primary | ICD-10-CM

## 2025-01-21 PROCEDURE — 97140 MANUAL THERAPY 1/> REGIONS: CPT | Mod: GO | Performed by: OCCUPATIONAL THERAPIST

## 2025-01-21 RX ORDER — POTASSIUM CHLORIDE 750 MG/1
10 TABLET, EXTENDED RELEASE ORAL 2 TIMES DAILY
Qty: 180 TABLET | Refills: 3 | Status: SHIPPED | OUTPATIENT
Start: 2025-01-21

## 2025-01-23 NOTE — PHARMACY-ADMISSION MEDICATION HISTORY
"Pharmacy Medication History  Admission medication history interview status for the 9/17/2022  admission is complete. See EPIC admission navigator for prior to admission medications     Location of Interview: Patient room  Medication history sources: Patient and Surescripts    Significant changes made to the medication list:  Removed:  Co-enzyme Q-10  Metoprolol Succinate    In the past week, patient estimated taking medication this percent of the time: greater than 90%    Additional medication history information:   The patient could not remember if he was taking carvedilol. According to [facility], the patient has not picked up a prescription of this med since 10/21.   He could not remember if he took his spironolactone yesterday or today.   He recently stopped taking his warfarin for 5 days due to a shoulder surgery and has since had a dosing change to his warfarin. I have updated the current dosing per his Anticoagulation Clinic.    Addendum: per H&P, family members brought in home supply of patient's pill bottles. \"No coreg or clonidine included in pill bottles. Not appear to be on these meds. Remove from meds\" - marked as not taking below and removed on med list for discharge reconciliation.     Medication reconciliation completed by provider prior to medication history? No    Time spent in this activity: 35 minutes    Prior to Admission medications    Medication Sig Last Dose Taking? Auth Provider Long Term End Date   acetaminophen (TYLENOL) 500 MG tablet Take 1,000 mg by mouth 4 times daily as needed 9/17/2022 at am Yes Reported, Patient     Acetylcarnitine HCl (ACETYL L-CARNITINE PO) Take 1 capsule by mouth daily 9/16/2022 at pm Yes Reported, Patient     albuterol (PROAIR HFA/PROVENTIL HFA/VENTOLIN HFA) 108 (90 Base) MCG/ACT inhaler Inhale 2 puffs into the lungs 4 times daily as needed for shortness of breath / dyspnea or wheezing prn Yes Reported, Patient No    aspirin (ASA) 81 MG chewable tablet Take 1 " tablet by mouth daily 9/17/2022 at am Yes Reported, Patient     atorvastatin (LIPITOR) 40 MG tablet TAKE 1 TABLET BY MOUTH EVERY EVENING 9/16/2022 at pm Yes Dontrell Gómez MD Yes    cloNIDine (CATAPRES) 0.3 MG tablet Take 0.3 mg by mouth  Patient not taking 9/16/2022 at pm Yes Reported, Patient Yes    diltiazem ER COATED BEADS (CARDIZEM CD/CARTIA XT) 300 MG 24 hr capsule Take 1 capsule (300 mg) by mouth daily 9/16/2022 at pm Yes Brent Portillo MD Yes    Fluticasone-Umeclidin-Vilanterol (TRELEGY ELLIPTA) 200-62.5-25 MCG/INH oral inhaler Inhale 1 puff into the lungs daily 9/17/2022 at am Yes Reported, Patient     furosemide (LASIX) 20 MG tablet Take 2 tabs in morning and 1 tab in the sfternoon 9/17/2022 at am Yes Tiffany Suero NP Yes    isosorbide mononitrate (IMDUR) 30 MG 24 hr tablet Take 1 tablet (30 mg) by mouth daily 9/16/2022 at pm Yes Brent Portillo MD Yes    levothyroxine (SYNTHROID/LEVOTHROID) 175 MCG tablet Take 1 tablet (175 mcg) by mouth daily 9/17/2022 at am Yes Anne Heath MD Yes    lisinopril (ZESTRIL) 20 MG tablet Take 1 tablet (20 mg) by mouth daily 9/17/2022 at am Yes Tiffany Suero NP Yes    metolazone (ZAROXOLYN) 2.5 MG tablet Take 1 tablet (2.5 mg) by mouth 2 times daily 9/17/2022 at am Yes Anne Heath MD Yes    Multiple vitamin TABS Take 1 tablet by mouth daily 9/17/2022 at am Yes Reported, Patient     spironolactone (ALDACTONE) 25 MG tablet Take 1 tablet (25 mg) by mouth every other day Past Week at Unknown time Yes Anne Heath MD Yes    tamsulosin (FLOMAX) 0.4 MG capsule Take 1 capsule (0.4 mg) by mouth daily 9/16/2022 at pm Yes Anne Heath MD     TAURINE PO Take 1 capsule by mouth daily 9/16/2022 at pm Yes Reported, Patient     traMADol (ULTRAM) 50 MG tablet Take 0.5 tablets (25 mg) by mouth 2 times daily as needed for severe pain 9/17/2022 at am Yes Swetha Allen MD  10/9/22   venlafaxine (EFFEXOR) 37.5 MG tablet  Take 1 tablet (37.5 mg) by mouth 2 times daily 9/17/2022 at am Yes Anne Heath MD Yes    warfarin ANTICOAGULANT (COUMADIN) 5 MG tablet Take by mouth 5 mg every Sunday & Thursday; 7.5 mg all other days of the week or as directed by the Anticoagulation Clinic  Patient taking differently: Take by mouth 5 mg every Monday, Wednesday, Friday; 7.5 mg all other days of the week or as directed by the Anticoagulation Clinic 9/17/2022 at am Yes Anne Heath MD     carvedilol (COREG) 12.5 MG tablet TAKE 1 TABLET BY MOUTH TWICE A DAY  Patient not taking   Edith Hawthorne MD Yes        The information provided in this note is only as accurate as the sources available at the time of update(s)   Nicole Butt  Pharmacy Intern    Medication history and patient interview completed by pharmacy intern/student.  Although I was not present for the interview, nor did I personally see the patient, to my knowledge the intern/student has compiled the PTA medication list to the best of their ability given the information available at the time. Reviewed by pharmacist, including [facility] dispense records, [facility] Care Everywhere, and chart review.   Payal Adamson, PharmD (previous attestation from Nimisha Harkins, PharmD, BCPS)        no

## 2025-01-27 ENCOUNTER — THERAPY VISIT (OUTPATIENT)
Dept: PHYSICAL THERAPY | Facility: CLINIC | Age: 85
End: 2025-01-27
Payer: MEDICARE

## 2025-01-27 DIAGNOSIS — R26.89 IMPAIRMENT OF BALANCE: ICD-10-CM

## 2025-01-27 DIAGNOSIS — M17.12 PRIMARY OSTEOARTHRITIS OF LEFT KNEE: ICD-10-CM

## 2025-01-27 DIAGNOSIS — M19.072 PRIMARY OSTEOARTHRITIS OF LEFT ANKLE: ICD-10-CM

## 2025-01-27 DIAGNOSIS — R26.2 DIFFICULTY WALKING: ICD-10-CM

## 2025-01-27 DIAGNOSIS — R29.898 LEG WEAKNESS, BILATERAL: Primary | ICD-10-CM

## 2025-01-27 PROCEDURE — 97110 THERAPEUTIC EXERCISES: CPT | Mod: GP | Performed by: PHYSICAL THERAPIST

## 2025-01-27 PROCEDURE — 97112 NEUROMUSCULAR REEDUCATION: CPT | Mod: GP | Performed by: PHYSICAL THERAPIST

## 2025-02-01 ENCOUNTER — HEALTH MAINTENANCE LETTER (OUTPATIENT)
Age: 85
End: 2025-02-01

## 2025-02-03 ENCOUNTER — VIRTUAL VISIT (OUTPATIENT)
Dept: PHARMACY | Facility: CLINIC | Age: 85
End: 2025-02-03

## 2025-02-03 DIAGNOSIS — Z78.9 TAKES DIETARY SUPPLEMENTS: ICD-10-CM

## 2025-02-03 DIAGNOSIS — K59.00 CONSTIPATION, UNSPECIFIED CONSTIPATION TYPE: ICD-10-CM

## 2025-02-03 DIAGNOSIS — F32.A DEPRESSION, UNSPECIFIED DEPRESSION TYPE: ICD-10-CM

## 2025-02-03 DIAGNOSIS — J44.9 CHRONIC OBSTRUCTIVE PULMONARY DISEASE, UNSPECIFIED COPD TYPE (H): ICD-10-CM

## 2025-02-03 DIAGNOSIS — R52 GENERALIZED PAIN: ICD-10-CM

## 2025-02-03 DIAGNOSIS — E03.9 HYPOTHYROIDISM, UNSPECIFIED TYPE: ICD-10-CM

## 2025-02-03 DIAGNOSIS — I82.4Z9 DEEP VEIN THROMBOSIS (DVT) OF DISTAL VEIN OF LOWER EXTREMITY, UNSPECIFIED CHRONICITY, UNSPECIFIED LATERALITY (H): ICD-10-CM

## 2025-02-03 DIAGNOSIS — F41.9 ANXIETY: ICD-10-CM

## 2025-02-03 DIAGNOSIS — I25.119 CORONARY ARTERY DISEASE INVOLVING NATIVE CORONARY ARTERY WITH ANGINA PECTORIS, UNSPECIFIED WHETHER NATIVE OR TRANSPLANTED HEART: ICD-10-CM

## 2025-02-03 DIAGNOSIS — I50.33 ACUTE ON CHRONIC HEART FAILURE WITH PRESERVED EJECTION FRACTION (H): ICD-10-CM

## 2025-02-03 DIAGNOSIS — I10 ESSENTIAL HYPERTENSION: Primary | ICD-10-CM

## 2025-02-03 DIAGNOSIS — N40.0 BENIGN PROSTATIC HYPERPLASIA WITHOUT LOWER URINARY TRACT SYMPTOMS: ICD-10-CM

## 2025-02-03 DIAGNOSIS — E78.49 OTHER HYPERLIPIDEMIA: ICD-10-CM

## 2025-02-03 DIAGNOSIS — I48.0 PAROXYSMAL A-FIB (H): ICD-10-CM

## 2025-02-03 PROCEDURE — 99207 PR NO CHARGE LOS: CPT | Mod: 95 | Performed by: PHARMACIST

## 2025-02-03 NOTE — PATIENT INSTRUCTIONS
"Recommendations from today's MTM visit:                                                    MTM (medication therapy management) is a service provided by a clinical pharmacist designed to help you get the most of out of your medicines.   Today we reviewed what your medicines are for, how to know if they are working, that your medicines are safe and how to make your medicine regimen as easy as possible.      Continue current medication regimen.     Follow-up: Return in about 6 months (around 8/3/2025) for Follow-up Medication Review.    It was great speaking with you today.  I value your experience and would be very thankful for your time in providing feedback in our clinic survey. In the next few days, you may receive an email or text message from [facility] with a link to a survey related to your  clinical pharmacist.\"     To schedule another MTM appointment, please call the clinic directly or you may call the MTM scheduling line at [phone] or toll-free at [phone].     My Clinical Pharmacist's contact information:                                                      Please feel free to contact me with any questions or concerns you have.      Anne Marie Gracia PharmD  Medication Therapy Management Pharmacy Resident  [facility]  [phone]    Kassi Glez PharmD, [facility]  Medication Therapy Management Provider  [facility]  [phone]     "

## 2025-02-03 NOTE — PROGRESS NOTES
Medication Therapy Management (MTM) Encounter    ASSESSMENT:                            Medication Adherence/Access: No issues identified.    Hypertension/A. Fib/HFpEF/H/o PE/DVT/CAD   Stable. Per ACC 2023 guidelines, recommended therapy for HFpEF can include ACEi/ARB/ARNi, SGLT2i, and MRA. Currently he is only on an ACEi, due to need to balance hypotension with fluid retention.    Hyperlipidemia  Stable.  LDL is at goal <70mg/dL.     COPD   Stable    Constipation   Stable    Mental Health   Anxiety and Depression  Stable     Hypothyroidism   Stable    BPH  Stable    Pain  Stable    Supplements   Stable    PLAN:                            Continue current medication regimen    Follow-up: Return in about 6 months (around 8/3/2025) for Follow-up Medication Review.    SUBJECTIVE/OBJECTIVE:                          Jaun Craven is a 84 year old male seen for a follow-up visit.       Reason for visit: Routine follow-up.  He has no specific questions or concerns today.    Tobacco: He reports that he quit smoking about 35 years ago. His smoking use included cigarettes. He started smoking about 65 years ago. He has a 15 pack-year smoking history. He has been exposed to tobacco smoke. He has never used smokeless tobacco.  Alcohol: none  Caffeine:  1 diet pepsi per day    Medication Adherence/Access: no issues reported.    Hypertension /A. Fib/HFpEF/H/o PE/DVT/CAD   Aspirin 81mg daily  Xarelto 20mg daily  Diltiazem ER 300mg daily  Furosemide 20mg twice daily (with KCl 20mEq every morning and 10mEq every evening)  Lisinopril 20mg daily  Metolazone 2.5mg daily as needed 30 minutes prior to furosemide - using about once a week currently, takes if he notices more fluid retention  He does have easy bruising, but denies other signs and symptoms of bleeding or other side effects.  He reports blood pressure has been around 140/80mmHg.  Orthostatic hypotension symptoms have improved.  He continues monitoring weight daily and reports  "weight is slowly trending down (which he's been focusing on) - home weight around 251 lbs.  He denies any shortness of breath   He is wearing compression stockings daily, continues working with lymphedema clinic.  Last EF 10/18/2024 - 60-65%, stable from previous    Patient has history of coronary artery disease with stenting of the proximal left anterior descending artery and also stenting of the proximal to mid right coronary artery in 2014.  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      Hyperlipidemia   Atorvastatin 80mg daily  Patient reports no significant myalgias or other side effects.     COPD   Albuterol MDI as needed - using <1x/month currently  Trelegy Ellipta 200mcg daily  Patient reports no current medication side effects.    Patient reports the following symptoms: none, feels breathing is currently well controlled.     Constipation   Miralax 17g daily  Patient feels that current therapy is effective.   Patient reports no current medication side effects.      Mental Health   Anxiety and Depression  Venlafaxine 75mg twice daily  Patient reports no current medication side effects.  Patient reports symptoms are stable.     Hypothyroidism   Levothyroxine 175 mcg daily 6x/week and 350mcg once weekly  Patient is having the following symptoms: none.      BPH  Tamsulosin 0.4 mg daily  He reports urinary symptoms are \"doing pretty good\"  No side effects other than orthostatic hypotension reported.     Pain  Acetaminophen 1000mg four times daily as needed - generally takes three times daily, currently taking four times daily due to recent Mohs surgery  He reports pain is stable.  Denies side effects.    Supplements   Acetyl l-carnitine daily (dose unknown, was previously recommended by a naturopath, finds effective)  Ferrous sulfate 45mg every other day   Daily multivitamin   Taurine daily (dose unknown, was previously recommended by a naturopath, finds effective)  No reported issues at this time.      "   Today's Vitals: There were no vitals taken for this visit.  ----------------    I spent 20 minutes with this patient today.     A summary of these recommendations was sent via [employer].    Elli WintersD, [facility]  Medication Therapy Management Provider  [phone]     Telemedicine Visit Details  The patient's medications can be safely assessed via a telemedicine encounter.  Type of service:  Video Conference via [employer]  Originating Location (pt. Location): Home    Distant Location (provider location):  On-site  Start Time: 8:34 AM  End Time: 8:54 AM     Medication Therapy Recommendations  No medication therapy recommendations to display

## 2025-02-04 ENCOUNTER — THERAPY VISIT (OUTPATIENT)
Dept: OCCUPATIONAL THERAPY | Facility: CLINIC | Age: 85
End: 2025-02-04
Payer: MEDICARE

## 2025-02-04 DIAGNOSIS — I89.0 LYMPHEDEMA: Primary | ICD-10-CM

## 2025-02-04 DIAGNOSIS — J42 CHRONIC BRONCHITIS, UNSPECIFIED CHRONIC BRONCHITIS TYPE (H): ICD-10-CM

## 2025-02-04 PROCEDURE — 97140 MANUAL THERAPY 1/> REGIONS: CPT | Mod: GO | Performed by: OCCUPATIONAL THERAPIST

## 2025-02-04 RX ORDER — ALBUTEROL SULFATE 90 UG/1
INHALANT RESPIRATORY (INHALATION)
Qty: 18 G | Refills: 2 | Status: SHIPPED | OUTPATIENT
Start: 2025-02-04

## 2025-02-05 ENCOUNTER — THERAPY VISIT (OUTPATIENT)
Dept: PHYSICAL THERAPY | Facility: CLINIC | Age: 85
End: 2025-02-05
Payer: MEDICARE

## 2025-02-05 DIAGNOSIS — R29.898 LEG WEAKNESS, BILATERAL: Primary | ICD-10-CM

## 2025-02-05 DIAGNOSIS — R26.2 DIFFICULTY WALKING: ICD-10-CM

## 2025-02-05 DIAGNOSIS — R26.89 IMPAIRMENT OF BALANCE: ICD-10-CM

## 2025-02-05 PROCEDURE — 97112 NEUROMUSCULAR REEDUCATION: CPT | Mod: GP | Performed by: PHYSICAL THERAPIST

## 2025-02-06 ENCOUNTER — TELEPHONE (OUTPATIENT)
Dept: NEPHROLOGY | Facility: CLINIC | Age: 85
End: 2025-02-06

## 2025-02-06 ENCOUNTER — THERAPY VISIT (OUTPATIENT)
Dept: PHYSICAL THERAPY | Facility: CLINIC | Age: 85
End: 2025-02-06
Attending: INTERNAL MEDICINE
Payer: COMMERCIAL

## 2025-02-06 DIAGNOSIS — M17.12 PRIMARY OSTEOARTHRITIS OF LEFT KNEE: Primary | ICD-10-CM

## 2025-02-06 PROCEDURE — 97530 THERAPEUTIC ACTIVITIES: CPT | Mod: GP | Performed by: PHYSICAL THERAPIST

## 2025-02-06 PROCEDURE — 97161 PT EVAL LOW COMPLEX 20 MIN: CPT | Mod: GP | Performed by: PHYSICAL THERAPIST

## 2025-02-06 NOTE — TELEPHONE ENCOUNTER
Left Voicemail (1st Attempt) and Sent Mychart (1st Attempt) for the patient to call back and schedule the following:    Appointment type: Return Nephrology Video  Provider: Alisia Suero NP  Return date: May 2025  Specialty phone number: [phone]  Additional appointment(s) needed: Labs prior to appt  Additonal Notes: NA

## 2025-02-06 NOTE — PROGRESS NOTES
PHYSICAL THERAPY EVALUATION  Type of Visit: Evaluation              Subjective         Presenting condition or subjective complaint: Knee left problem; L knee arthritis. Is working in PT on balance and gait, but continued knee pain.  Reports pain and instability, especially if fatigued.   Date of onset: 25    Relevant medical history:   L knee replacement ; R chronic ankle sprain.   Dates & types of surgery: Shoulder replacement   Prior diagnostic imaging/testing results: X-ray   1/15/25 Severe lateral and moderate medial and patellofemoral compartment degenerative arthritis. Small effusion. Genu valgus. Arterial calcification.   Prior therapy history for the same diagnosis, illness or injury: No      Living Environment  Social support: Alone   Type of home: Apartment/condo; 1 level   Stairs to enter the home:         Ramp: No   Stairs inside the home: No       Help at home: None  Equipment owned: Bemba Cane   Employment: No    Hobbies/Interests: Writing    Patient goals for therapy: Everything    Pain assessment: See objective evaluation for additional pain details; uses ice/tylenol for pain.      Objective   Standing Alignment:              Knee:  Genu valgus L      Gait:       Weight Bearing Status:  WBAT   Assistive Devices:  Cane                                                       Knee Evaluation:  ROM:    AROM        Flexion: Left: comparable flex B    Right:              Palpation:    Left knee tenderness present at:  Lateral Joint Line; Patellar Tendon and Patellar Inferior             Ossur Knee  Brace Trial:     Pain Rating    Affected Joint: Left Knee    Without  brace:  At rest 2/10  Walkin/10    Squattin/10   Stairs: 5/10 (limited by balance)     Wearing  brace:  At rest 2/10  Walkin/10  reported improved stability  Squattin/10 reported inc stability with rise to stand  Stairs: NT     Brace and Measurements:    Brace trialed:     Type - Ossur   One  Size - Large  Side - Lateral  Affected Knee - Left Knee    Circumference 6 inches below mid patella: 19.25 **Patient wears lymph edema bandages on lower leg*  inches (Ossur ) and Circumference 6 inches above mid patella: 21 inches (Ossur OA Ease Knee )    Other Comments:  [facility] for any additional information including joint special testing/ligament testing         Assessment & Plan   CLINICAL IMPRESSIONS  Medical Diagnosis: L knee Primary OA    Treatment Diagnosis: L knee primary OA   Impression/Assessment: Patient is a 84 year old male with L knee pain and balance/gait complaints.  The following significant findings have been identified: Pain, Decreased strength, Impaired gait, and Decreased activity tolerance. These impairments interfere with their ability to perform self care tasks, household chores, household mobility, and community mobility as compared to previous level of function.     Clinical Decision Making (Complexity):  Clinical Presentation: Stable/Uncomplicated  Clinical Presentation Rationale: based on medical and personal factors listed in PT evaluation  Clinical Decision Making (Complexity): Low complexity    PLAN OF CARE  Treatment Interventions:  Interventions: Therapeutic Activity, Self-Care/Home Management, Orthotic Fitting/Training    Long Term Goals     PT Goal 1  Goal Identifier: Bracing  Goal Description: demonstrate don/doff bracing for daily use to improve stability with gait in L knee  Rationale: to maximize safety and independence with performance of ADLs and functional tasks;to maximize safety and independence within the home;to maximize safety and independence within the community;to maximize safety and independence with self cares  Target Date: 04/16/25      Frequency of Treatment: bi-monthly  Duration of Treatment: up to 10 wks total duration    Recommended Referrals to Other Professionals:  Patient is being seen in Neuro PT for  gait/balance deficits, recommend continued skilled care  Education Assessment:      Risks and benefits of evaluation/treatment have been explained.   Patient/Family/caregiver agrees with Plan of Care.     Evaluation Time:     PT Eval, Low Complexity Minutes (34797): 15  Signing Clinician: IV[facility]                                                                                   OUTPATIENT PHYSICAL THERAPY      PLAN OF TREATMENT FOR OUTPATIENT REHABILITATION   Patient's Last Name, First Name, Erich Monet YOB: 1940   Provider's Name   [facility]   Medical Record No.  [id number]     Onset Date: 01/27/25  Start of Care Date: 02/06/25     Medical Diagnosis:  L knee Primary OA      PT Treatment Diagnosis:  L knee primary OA Plan of Treatment  Frequency/Duration: bi-monthly/ up to 10 wks total duration    Certification date from 02/06/25 to 04/16/25         See note for plan of treatment details and functional goals     Kacy Edwards PT                         I CERTIFY THE NEED FOR THESE SERVICES FURNISHED UNDER        THIS PLAN OF TREATMENT AND WHILE UNDER MY CARE     (Physician attestation of this document indicates review and certification of the therapy plan).              Referring Provider:  Anne Heath    Initial Assessment  See Epic Evaluation- Start of Care Date: 02/06/25

## 2025-02-07 ENCOUNTER — HOSPITAL ENCOUNTER (OUTPATIENT)
Dept: CARDIOLOGY | Facility: CLINIC | Age: 85
Discharge: HOME | End: 2025-02-07
Attending: INTERNAL MEDICINE | Admitting: INTERNAL MEDICINE
Payer: MEDICARE

## 2025-02-07 DIAGNOSIS — I77.810 ASCENDING AORTA DILATATION: ICD-10-CM

## 2025-02-07 DIAGNOSIS — I77.810 AORTIC ROOT DILATATION: ICD-10-CM

## 2025-02-07 DIAGNOSIS — I50.30 HEART FAILURE WITH PRESERVED EJECTION FRACTION, UNSPECIFIED HF CHRONICITY (H): ICD-10-CM

## 2025-02-07 LAB — LVEF ECHO: NORMAL

## 2025-02-07 PROCEDURE — 255N000002 HC RX 255 OP 636: Performed by: INTERNAL MEDICINE

## 2025-02-07 PROCEDURE — 93306 TTE W/DOPPLER COMPLETE: CPT | Mod: 26 | Performed by: INTERNAL MEDICINE

## 2025-02-07 PROCEDURE — 999N000208 ECHOCARDIOGRAM COMPLETE

## 2025-02-07 RX ADMIN — HUMAN ALBUMIN MICROSPHERES AND PERFLUTREN 9 ML: 10; .22 INJECTION, SOLUTION INTRAVENOUS at 10:51

## 2025-02-07 NOTE — PROGRESS NOTES
[facility] Consult from 10/18/2024, pt hospitalized for dyspnea on exertion, near syncope, generalized weakness    [place] [employer] (D) PPM  Presenting rhythm: AP/VS  Battery: 15 yrs estimated longevity   Available lead measurements WNL  Mode: DDDR   AP 21%   0%  No arrhythmias recorded since last check on 10/1/68247

## 2025-02-11 ENCOUNTER — DOCUMENTATION ONLY (OUTPATIENT)
Dept: FAMILY MEDICINE | Facility: CLINIC | Age: 85
End: 2025-02-11
Payer: MEDICARE

## 2025-02-11 DIAGNOSIS — M17.12 PRIMARY OSTEOARTHRITIS OF LEFT KNEE: Primary | ICD-10-CM

## 2025-02-12 ENCOUNTER — THERAPY VISIT (OUTPATIENT)
Dept: PHYSICAL THERAPY | Facility: CLINIC | Age: 85
End: 2025-02-12
Payer: COMMERCIAL

## 2025-02-12 DIAGNOSIS — R29.898 LEG WEAKNESS, BILATERAL: ICD-10-CM

## 2025-02-12 DIAGNOSIS — R26.89 BALANCE PROBLEM: ICD-10-CM

## 2025-02-12 DIAGNOSIS — R26.89 IMPAIRMENT OF BALANCE: Primary | ICD-10-CM

## 2025-02-12 PROCEDURE — 97112 NEUROMUSCULAR REEDUCATION: CPT | Mod: GP | Performed by: PHYSICAL THERAPIST

## 2025-02-12 PROCEDURE — 97110 THERAPEUTIC EXERCISES: CPT | Mod: GP | Performed by: PHYSICAL THERAPIST

## 2025-02-19 ENCOUNTER — THERAPY VISIT (OUTPATIENT)
Dept: PHYSICAL THERAPY | Facility: CLINIC | Age: 85
End: 2025-02-19
Payer: COMMERCIAL

## 2025-02-19 DIAGNOSIS — E61.1 LOW IRON: ICD-10-CM

## 2025-02-19 DIAGNOSIS — R29.898 LEG WEAKNESS, BILATERAL: ICD-10-CM

## 2025-02-19 DIAGNOSIS — R26.2 DIFFICULTY WALKING: ICD-10-CM

## 2025-02-19 DIAGNOSIS — R26.89 BALANCE PROBLEM: ICD-10-CM

## 2025-02-19 DIAGNOSIS — R26.89 IMPAIRMENT OF BALANCE: Primary | ICD-10-CM

## 2025-02-19 PROCEDURE — 97110 THERAPEUTIC EXERCISES: CPT | Mod: GP | Performed by: PHYSICAL THERAPIST

## 2025-02-19 PROCEDURE — 97530 THERAPEUTIC ACTIVITIES: CPT | Mod: GP | Performed by: PHYSICAL THERAPIST

## 2025-02-19 RX ORDER — UREA 10 %
45 LOTION (ML) TOPICAL EVERY OTHER DAY
Qty: 45 TABLET | Refills: 0 | Status: SHIPPED | OUTPATIENT
Start: 2025-02-19

## 2025-02-21 ENCOUNTER — THERAPY VISIT (OUTPATIENT)
Dept: OCCUPATIONAL THERAPY | Facility: CLINIC | Age: 85
End: 2025-02-21
Payer: MEDICARE

## 2025-02-21 DIAGNOSIS — I89.0 LYMPHEDEMA: Primary | ICD-10-CM

## 2025-02-21 PROCEDURE — 97140 MANUAL THERAPY 1/> REGIONS: CPT | Mod: GO | Performed by: OCCUPATIONAL THERAPIST

## 2025-02-22 NOTE — PROGRESS NOTES
02/21/25 0500   Appointment Info   Treating Provider Jaylene Russo, OTR/L, CLT   Visits Used 13 - Medicare / [facility]   Medical Diagnosis Lymphedema   OT Tx Diagnosis Lymphedema   Progress Note/Certification   Start Of Care Date 11/12/24   Onset of Illness/Injury or Date of Surgery 07/05/23  (Chronic for years. First evaluated in clinic in 2023)   Therapy Frequency 1x every 3-4 weeks   Predicted Duration 90 days   Certification date from 02/21/25   Certification date to 05/21/25   Progress Note Due Date   (10th visit)   Progress Note Completed Date 02/21/25  (Updated POC on 2/21/2025)   Goals   OT Goals 1;2;3;4;5;6   OT Goal 1   Goal Identifier Education   Goal Description Pt will IND verbalize s/s lymphedema, infection, components CDT, importance of skin care to prevent infection, and when to seek medical attention due to exacerbation of symptoms.   Rationale In order to maximize safety and independence with performance of self-care activities   Goal Progress Goal met - Pt IND verbalizing s/s lymphedema, skin infection, tx techs, when to seek medical attention   Target Date 01/20/25   Date Met 11/25/24   OT Goal 2   Goal Identifier GCB   Goal Description Pt or caregiver will IND don/doff GCB and pt will tolerate GCB for 24 hrs to promote lymph flow and volume reduction for improved leg profile, functional mobility, and ADL/IADL IND.   Rationale In order to maximize safety and independence with ADL/IADLs   Goal Progress Goal Met - Pt is IND w/ compression garments, scheduled so that GCB applied in clinic, then doffed IND at home   Target Date 01/20/25   Date Met 12/02/24   OT Goal 3   Goal Identifier Home Program   Goal Description Pt will demonstrate IND completion of self-MLD and exercises to promote lymph mobility.   Rationale In order to maximize safety and independence with ADL/IADLs   Goal Progress Ongoing. Pt has Entre compression pump, uses compression garments, performs skin cares. Pt continues to benefit  "from problem solving assist and reinforced edu in home program.   Target Date 05/21/25   OT Goal 4   Goal Identifier Garments   Goal Description Pt or caregiver will IND don/doff compression garments to promote/maintain reductions in limb volume for improved clothing fit, functional mobility, and ADL/IADL performance.   Rationale In order to maximize safety and independence with ADL/IADLs   Goal Progress Ongoing - pt is using velcro wraps w/ foot component, continues to benefit from edu in donning tech   Target Date 05/21/25   OT Goal 5   Goal Identifier Volume   Goal Description BLE volume will reduce by 500 ml for improved skin integrity, clothing fit, functional mobiliity, and ADL/IADL engagement.   Rationale In order to maximize safety and independence with ADL/IADLs   Goal Progress Ongoing - pt presenting w/ variable BLE volume   Target Date 05/21/25   OT Goal 6   Goal Identifier LLIS / Pain   Goal Description LLIS / Pain score will reduce by 7 points to demonstrate reduced lymphedema burden on ADL/IADL performance.   Rationale In order to maximize safety and independence with ADL/IADLs   Goal Progress Goal Ended - Entered in error   Subjective Report   Subjective Report \"Maintaining my legs is really important to me. It's been helpful to have your guidance.\"   Manual Therapy   Manual Therapy Minutes ([id number]) 57   Manual Therapy 1 Pt arrived wearing velcro wraps on calf and new foot components with liner sock and short length of elastic stockinette over ankle. Pt presenting w/ L > R swelling, ankles notably reduced with use of new foot velcro garments. Skin intact with lipodermatosclerosis coloring and fibrosis, mildly dry around toes. To promote lymph flow, OT performed MLD while pt supine: neck seq, abdo seq, cleared trav axillary LN and ing LN, performed stationary circles on BLE directing fluid proximally, away from affected areas, extended time at ankles an feet for fibrosis tech. Applied lotion to BLE to " promote/maintain skin integrity. Pt IND donned liner socks, calf velcro wraps. OT noted pt was applying foot components upside down, educated pt in correct donning tech, identifying [employer] ReadyWrap color coding to identify top/bottom of wrap and ease donning. OT informed pt about conversation w/ PT re: knee brace fit w/ lymphedema garments. OT recommend pt prioritize finding knee brace to support knee and improve safe mobility; pt can wear foot/ankle velcro wrap alone and calf velcro wrap in evening or at night if knee brace won't fit over lymph wraps. Pt verbalized understanding. Discussed POC, collaboratively decided OT will continue to follow patient at low frequency for ongoing edu in home program and problem solving assist as pt's condition changes.   Skilled Intervention education, MLD, GCB   Patient Response/Progress Pt verbalized understanding of all education, softening noted following MLD. Volume reduction over course of tx, + progress   Education   Learner/Method Patient;Listening;Reading;Demonstration;No Barriers to Learning   Education Comments POC, components CDT, use of compression garments/wear schedule, s/s worsening lymphedema and infection   Plan   Home program GCB, stockings, velcro garments, walking, skin cares   Updates to plan of care Pt has Entre pump, ReadyWrap foot and calf garments, working w/ PT to fit knee support brace.   Plan for next session Follow up Entre use, MLD, home program suggestions, did pt get knee brace?   Comments   Comments Pt has BLE lymphedema with skin changes. Pt is using compression garments, self-MLD, and skin cares to manage symptoms. Pt is at risk for worsening symptoms and skin infection if BLE lymphedema not well managaged. Pt continues to benefit from monitoring and education from skilled lymphedema therapist.   Total Session Time   Timed Code Treatment Minutes 57   Total Treatment Time (sum of timed and untimed services) 57         [facility]  Rehabilitation Services                                                                                   OUTPATIENT OCCUPATIONAL THERAPY    PLAN OF TREATMENT FOR OUTPATIENT REHABILITATION   Patient's Last Name, First Name, Erich Monet YOB: 1940   Provider's Name   [facility]   Medical Record No.  [id number]     Onset Date: 07/05/23 (Chronic for years. First evaluated in clinic in 2023) Start of Care Date: 11/12/24     Medical Diagnosis:  Lymphedema      OT Treatment Diagnosis:  Lymphedema Plan of Treatment  Frequency/Duration:1x every 3-4 weeks/90 days    Certification date from 02/21/25   To 05/21/25        See note for plan of treatment details and functional goals     Jaylene Russo, OTR                         I CERTIFY THE NEED FOR THESE SERVICES FURNISHED UNDER        THIS PLAN OF TREATMENT AND WHILE UNDER MY CARE     (Physician attestation of this document indicates review and certification of the therapy plan).              Referring Provider:  Monty Pardo    Initial Assessment  See Epic Evaluation- 11/12/24

## 2025-02-23 ENCOUNTER — OFFICE VISIT (OUTPATIENT)
Dept: URGENT CARE | Facility: URGENT CARE | Age: 85
End: 2025-02-23
Payer: MEDICARE

## 2025-02-23 VITALS
DIASTOLIC BLOOD PRESSURE: 83 MMHG | RESPIRATION RATE: 20 BRPM | TEMPERATURE: 97.9 F | SYSTOLIC BLOOD PRESSURE: 170 MMHG | OXYGEN SATURATION: 95 % | HEART RATE: 69 BPM

## 2025-02-23 DIAGNOSIS — I87.2 CHRONIC VENOUS STASIS DERMATITIS OF BOTH LOWER EXTREMITIES: ICD-10-CM

## 2025-02-23 DIAGNOSIS — S80.12XA HEMATOMA OF LEG, LEFT, INITIAL ENCOUNTER: Primary | ICD-10-CM

## 2025-02-23 DIAGNOSIS — L03.116 CELLULITIS OF LEFT LOWER LEG: ICD-10-CM

## 2025-02-23 PROCEDURE — 99213 OFFICE O/P EST LOW 20 MIN: CPT | Performed by: INTERNAL MEDICINE

## 2025-02-23 RX ORDER — CEPHALEXIN 500 MG/1
1000 CAPSULE ORAL 2 TIMES DAILY
Qty: 28 CAPSULE | Refills: 0 | Status: SHIPPED | OUTPATIENT
Start: 2025-02-23 | End: 2025-03-02

## 2025-02-23 NOTE — PROGRESS NOTES
SUBJECTIVE:  This 84 year old male sustained a fall yesterday.  Concerned now about some swelling below the knee on the left side.  He has chronic venous insufficiency, CAD, PAF, CHF and history of VTE.  On DOAC (rivaroxaban).  Also had some blow to the head.  Doing well today with alertness and concentration.  Primary concern is the skin below the knee on the left where he notes more swelling and pain.      OBJECTIVE:  BP (!) 170/83   Pulse 69   Temp 97.9  F (36.6  C) (Tympanic)   Resp 20   SpO2 95%   GEN: alert and oriented  LEFT KNEE: no joint effusion; FROM without pain  LEFT LOWER LEG: changes of chronic venous insufficiency dermatitis with hemosiderosis, brawny edema, hyperkeratosis distally; small abrasion anteriorly about 3 cm distal to the knee with primary hemostasis; tender and somewhat firm in the soft tissues around this area; remainder of the leg is not tender or hot though there is mild warmth associated with region of pale erythema several cm distal and lateral to the abrasion    ASSESSMENT/PLAN:    ICD-10-CM    1. Hematoma of leg, left, initial encounter  S80.12XA       2. Chronic venous stasis dermatitis of both lower extremities  I87.2       3. Cellulitis of left lower leg  L03.116 cephALEXin (KEFLEX) 500 MG capsule        Patient Instructions   Right now, things are looking like they should heal themselves with your usual practices of elevating the legs and using the compression wraps when your up and about.  If the lower leg is getting more intense pain, redness or warmth then start antibiotics (cephalexin).     Wait and see approach with antibiotic therapy as he is at increased risk of a cellulitis given his significantly impaired lymphatic drainage from the left lower leg and disrupted skin integrity.     Omar Haas MD

## 2025-02-23 NOTE — PATIENT INSTRUCTIONS
Right now, things are looking like they should heal themselves with your usual practices of elevating the legs and using the compression wraps when your up and about.  If the lower leg is getting more intense pain, redness or warmth then start antibiotics (cephalexin).

## 2025-02-25 ENCOUNTER — OFFICE VISIT (OUTPATIENT)
Dept: URGENT CARE | Facility: URGENT CARE | Age: 85
End: 2025-02-25
Payer: MEDICARE

## 2025-02-25 VITALS
SYSTOLIC BLOOD PRESSURE: 161 MMHG | OXYGEN SATURATION: 94 % | RESPIRATION RATE: 16 BRPM | DIASTOLIC BLOOD PRESSURE: 84 MMHG | WEIGHT: 255.5 LBS | BODY MASS INDEX: 34.65 KG/M2 | TEMPERATURE: 97.5 F | HEART RATE: 69 BPM

## 2025-02-25 DIAGNOSIS — R30.0 DYSURIA: Primary | ICD-10-CM

## 2025-02-25 DIAGNOSIS — E03.9 HYPOTHYROIDISM, UNSPECIFIED TYPE: ICD-10-CM

## 2025-02-25 PROCEDURE — 81003 URINALYSIS AUTO W/O SCOPE: CPT | Performed by: PHYSICIAN ASSISTANT

## 2025-02-25 RX ORDER — TAMSULOSIN HYDROCHLORIDE 0.4 MG/1
0.4 CAPSULE ORAL DAILY
Qty: 90 CAPSULE | Refills: 0 | Status: SHIPPED | OUTPATIENT
Start: 2025-02-25

## 2025-02-25 RX ORDER — SULFAMETHOXAZOLE AND TRIMETHOPRIM 800; 160 MG/1; MG/1
1 TABLET ORAL 2 TIMES DAILY
Qty: 20 TABLET | Refills: 0 | Status: SHIPPED | OUTPATIENT
Start: 2025-02-25 | End: 2025-03-07

## 2025-02-25 NOTE — PATIENT INSTRUCTIONS
Return if not able to empty bladder, fevers, nausea/vomiting, blood in the urine ,back pain or abdomen pain, testicular pain or swelling.    Follow up with your urologist within 1 week.

## 2025-02-25 NOTE — PROGRESS NOTES
Assessment & Plan     Dysuria  Patient was seen for irritative voiding symptoms x 3 days.  He also has restrictive voiding symptoms which had previously been very well-controlled with Flomax.  He has not missed any doses of his Flomax.  His UA is unremarkable today.  He has no back pain, abdominal pain or fevers.  Will culture urine given his history.  Suspect prostatitis clinically.  Will cover with Bactrim awaiting urine culture however if culture is negative he will continue on this as it does cover for prostatitis as well.  I discussed follow-up with his urologist within 1 week.  Discussed often times prostatitis does need a prolonged course of antibiotics which he can discuss with his urologist at time of follow-up if needed. PI given and discussed.  RTC for persistent or worsening sx. At the end of the encounter, I discussed results, diagnosis, medications. Discussed red flags for immediate return to clinic/ER, as well as indications for follow up if no improvement. Patient understood and agreed to plan. Patient was stable for discharge    - UA Macroscopic with reflex to Microscopic and Culture - Clinic Collect     30 minutes spent by me on the date of the encounter doing chart review, review of test results, interpretation of tests, patient visit, and documentation       Marleny Yancey PA-C  [facility] HERNAN Zamorano is a 84 year old male who presents to clinic today for the following health issues:  Chief Complaint   Patient presents with    Urgent Care     Pt presents frequency, painful urination, chills, onset 3 days. Sx's feels similar to last time with UTI.      HPI  Pt is a 84 year old male, presents to urgent care with concerns re: UTI.  He has a history of UTI secondary to BPH.  Since initial UTI he has been treated with Flomax which has been quite helpful for his restrictive voiding symptoms.  3 days ago he developed difficulty with initiating stream, urinary  frequency, urinary urgency, dysuria, and pressure in the lower pelvis.  No hematuria present.  No history of stones.  Take flomax daily.  Has not missed any doses.  Hard time starting stream, 3 days.    No nausea, vomiting.    No fevers.  Some chills.  No abdominal pain but does have some lower pelvic pressure.  No testicular pain or swelling.  No constipation.      Review of Systems  Constitutional, HEENT, cardiovascular, pulmonary, gi and gu systems are negative, except as otherwise noted.    Patient Active Problem List   Diagnosis    Morbid obesity (H)    PE (pulmonary thromboembolism) (H)    HTN (hypertension)    Chronic diastolic heart failure (H)    Dyspnea    RIN (obstructive sleep apnea)    Hypothyroidism    Paroxysmal A-fib (H)    Lymphedema    (HFpEF) heart failure with preserved ejection fraction (H)    Essential hypertension    Cardiac pacemaker in situ    DVT (deep venous thrombosis) (H)    Coronary artery disease involving native coronary artery with angina pectoris, unspecified whether native or transplanted heart    Obesity, Class III, BMI 40-49.9 (morbid obesity) (H)    Muscle tension headache    Heart failure with preserved ejection fraction, unspecified HF chronicity (H)    Anxiety    Deep vein thrombosis (DVT) of distal vein of lower extremity, unspecified chronicity, unspecified laterality (H)    Type 2 diabetes mellitus without complication, without long-term current use of insulin (H)    Chronic kidney disease, stage 3a (H)    Chronic right shoulder pain    Long term current use of anticoagulants with INR goal of 2.0-3.0    Benign neoplasm of cerebral meninges (H)    Ankle injury, right, initial encounter    Hypokalemia    Hypoxia    Pneumonia of left lung due to infectious organism, unspecified part of lung    Benign prostatic hyperplasia without lower urinary tract symptoms    History of coronary artery stent placement    Hypoxemia    Insomnia    Personal history of other venous thrombosis  and embolism    Personal history of pulmonary embolism    Pontine lesion    Prediabetes    Presence of left artificial shoulder joint    Sick sinus syndrome (H)    Slow transit constipation    Chronic diastolic (congestive) heart failure (H)    Sleep apnea    MARTÍNEZ (dyspnea on exertion)    Elevated troponin    DONA (acute kidney injury)    Orthostatic dizziness    Primary osteoarthritis of left knee     Current Outpatient Medications   Medication Sig Dispense Refill    acetaminophen (TYLENOL) 500 MG tablet Take 1,000 mg by mouth every 6 hours as needed for mild pain.      Acetylcarnitine HCl (ACETYL L-CARNITINE HCL PO) Take 1 capsule by mouth daily. Dose unknown      albuterol (PROAIR HFA/PROVENTIL HFA/VENTOLIN HFA) 108 (90 Base) MCG/ACT inhaler INHALE 2 PUFFS INTO THE LUNGS EVERY 6 HOURS AS NEEDED FOR SHORTNESS OF BREATH OR WHEEZING OR COUGH 18 g 2    aspirin (ASA) 81 MG chewable tablet Take 1 tablet by mouth daily      atorvastatin (LIPITOR) 80 MG tablet Take 1 tablet (80 mg) by mouth every evening. 90 tablet 3    cephALEXin (KEFLEX) 500 MG capsule Take 2 capsules (1,000 mg) by mouth 2 times daily for 7 days. 28 capsule 0    diltiazem ER COATED BEADS (CARDIZEM CD/CARTIA XT) 300 MG 24 hr capsule Take 1 capsule (300 mg) by mouth daily 90 capsule 3    ferrous sulfate 45 MG TBCR CR tablet TAKE 1 TABLET BY MOUTH EVERY OTHER DAY. 45 tablet 0    Fluticasone-Umeclidin-Vilanterol (TRELEGY ELLIPTA) 200-62.5-25 MCG/ACT oral inhaler INHALE 1 PUFF INTO THE LUNGS DAILY 60 each 3    furosemide (LASIX) 40 MG tablet Take 0.5 tablets (20 mg) by mouth 2 times daily. 90 tablet 2    levothyroxine (SYNTHROID/LEVOTHROID) 175 MCG tablet Take 1 tablet (175 mcg) by mouth six times a week. 2 tablet (350 mcg) on Fridays 102 tablet 1    lisinopril (ZESTRIL) 20 MG tablet Take 1 tablet (20 mg) by mouth 2 times daily. 180 tablet 3    metolazone (ZAROXOLYN) 2.5 MG tablet Take 1 tablet (2.5 mg) by mouth every other day. Taken 30 minutes prior to  furosemide (Patient taking differently: Take 2.5 mg by mouth daily as needed. Taken 30 minutes prior to furosemide) 45 tablet 0    Multiple vitamin TABS Take 1 tablet by mouth daily      polyethylene glycol (MIRALAX) 17 g packet Take 17 g by mouth daily 7 packet 0    potassium chloride cammy ER (KLOR-CON M10) 10 MEQ CR tablet Take 1 tablet (10 mEq) by mouth 2 times daily. (Patient taking differently: Take 20 mEq by mouth every morning. And 10mEq every evening) 180 tablet 3    rivaroxaban ANTICOAGULANT (XARELTO ANTICOAGULANT) 20 MG TABS tablet Take 1 tablet (20 mg) by mouth daily (with dinner). 90 tablet 1    sulfamethoxazole-trimethoprim (BACTRIM DS) 800-160 MG tablet Take 1 tablet by mouth 2 times daily for 10 days. 20 tablet 0    TAURINE PO Take 1 capsule by mouth daily. Dose unknown      venlafaxine (EFFEXOR) 75 MG tablet TAKE 1 TABLET(75 MG) BY MOUTH TWICE DAILY 180 tablet 1    tamsulosin (FLOMAX) 0.4 MG capsule TAKE 1 CAPSULE(0.4 MG) BY MOUTH DAILY 90 capsule 0     No current facility-administered medications for this visit.           Objective    BP (!) 161/84   Pulse 69   Temp 97.5  F (36.4  C) (Tympanic)   Resp 16   Wt 115.9 kg (255 lb 8 oz)   SpO2 94%   BMI 34.65 kg/m    Physical Exam   Patient is in no acute distress and appears well.  No costovertebral angle tenderness is present.  Abdomen is soft nontender to light or deep palpation no masses or hepatosplenomegaly present.  Results for orders placed or performed in visit on 02/25/25   UA Macroscopic with reflex to Microscopic and Culture - Clinic Collect     Status: Normal    Specimen: Urine, Clean Catch   Result Value Ref Range    Color Urine Yellow Colorless, Straw, Light Yellow, Yellow    Appearance Urine Clear Clear    Glucose Urine Negative Negative mg/dL    Bilirubin Urine Negative Negative    Ketones Urine Negative Negative mg/dL    Specific Gravity Urine 1.015 1.003 - 1.035    Blood Urine Negative Negative    pH Urine 7.0 5.0 - 7.0     Protein Albumin Urine Negative Negative mg/dL    Urobilinogen Urine 0.2 0.2, 1.0 E.U./dL    Nitrite Urine Negative Negative    Leukocyte Esterase Urine Negative Negative    Narrative    Microscopic not indicated       Culture pending given hx.

## 2025-02-26 LAB — BACTERIA UR CULT: NORMAL

## 2025-03-03 ENCOUNTER — OFFICE VISIT (OUTPATIENT)
Dept: FAMILY MEDICINE | Facility: CLINIC | Age: 85
End: 2025-03-03
Payer: MEDICARE

## 2025-03-03 ENCOUNTER — ANCILLARY PROCEDURE (OUTPATIENT)
Dept: GENERAL RADIOLOGY | Facility: CLINIC | Age: 85
End: 2025-03-03
Attending: NURSE PRACTITIONER
Payer: MEDICARE

## 2025-03-03 VITALS
RESPIRATION RATE: 16 BRPM | SYSTOLIC BLOOD PRESSURE: 134 MMHG | OXYGEN SATURATION: 95 % | BODY MASS INDEX: 34.73 KG/M2 | TEMPERATURE: 97 F | WEIGHT: 256.4 LBS | DIASTOLIC BLOOD PRESSURE: 70 MMHG | HEIGHT: 72 IN | HEART RATE: 71 BPM

## 2025-03-03 DIAGNOSIS — L03.116 CELLULITIS OF LEFT LEG: ICD-10-CM

## 2025-03-03 DIAGNOSIS — R39.9 LOWER URINARY TRACT SYMPTOMS (LUTS): ICD-10-CM

## 2025-03-03 DIAGNOSIS — S89.92XD INJURY OF LEFT KNEE, SUBSEQUENT ENCOUNTER: ICD-10-CM
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
## 2025-03-03 DIAGNOSIS — L03.116 CELLULITIS OF LEFT LEG: Primary | ICD-10-CM

## 2025-03-03 PROCEDURE — 73560 X-RAY EXAM OF KNEE 1 OR 2: CPT | Mod: TC | Performed by: STUDENT IN AN ORGANIZED HEALTH CARE EDUCATION/TRAINING PROGRAM

## 2025-03-03 PROCEDURE — 99214 OFFICE O/P EST MOD 30 MIN: CPT | Performed by: NURSE PRACTITIONER

## 2025-03-03 PROCEDURE — 3078F DIAST BP <80 MM HG: CPT | Performed by: NURSE PRACTITIONER

## 2025-03-03 PROCEDURE — 3075F SYST BP GE 130 - 139MM HG: CPT | Performed by: NURSE PRACTITIONER

## 2025-03-03 NOTE — PROGRESS NOTES
{PROVIDER CHARTING PREFERENCE:[id number]}    Vernell Zamorano is a 84 year old, presenting for the following health issues:  No chief complaint on file.    History of Present Illness       Reason for visit:  Cellutitus    He eats 0-1 servings of fruits and vegetables daily.He consumes 2 sweetened beverage(s) daily.He exercises with enough effort to increase his heart rate 10 to 19 minutes per day.  He exercises with enough effort to increase his heart rate 3 or less days per week.   He is taking medications regularly.        ED/UC Followup:    Facility:  [facility]  Date of visit: 02/28/25  Reason for visit: Cellulitis  Current Status: Still having pain in left leg, has not been able to wear compression stockings. Has noticed a possible decrease in swelling    Just seen on 2/28 in UC and dx cellulitis to lower leg   He was on Bactrim just a few days prior for urinary symptoms   He was then given keflex   It is a little better today but is still pretty sore   Taking tylenol for pain       Review of Systems  Detailed as above         Objective    /70 (BP Location: Right arm, Patient Position: Sitting, Cuff Size: Adult Regular)   Pulse 71   Temp 97  F (36.1  C)   Resp 16   Ht 1.829 m (6')   Wt 116.3 kg (256 lb 6.4 oz)   SpO2 95%   BMI 34.77 kg/m    There is no height or weight on file to calculate BMI.  Physical Exam  Constitutional:       Appearance: Normal appearance.   Pulmonary:      Effort: Pulmonary effort is normal.   Skin:     Comments: Small scab distal left anterior knee joint   Left lower leg edema with lymphedema      Neurological:      Mental Status: He is alert.   Psychiatric:         Mood and Affect: Mood normal.                    Signed Electronically by: VAMSI Lincoln CNP  {Email feedback regarding this note to [email or web address]   :[id number]}    Pulse 71   Temp 97  F (36.1  C)   Resp 16   Ht 1.829 m (6')   Wt 116.3 kg (256 lb 6.4 oz)   SpO2 95%   BMI 34.77 kg/m    There is no height or weight on file to calculate BMI.  Physical Exam  Constitutional:       Appearance: Normal appearance.   Pulmonary:      Effort: Pulmonary effort is normal.   Skin:     Comments: Small scab distal left anterior knee joint   Left lower leg mild erythema, hyperpigmentation with lymphedema      Neurological:      Mental Status: He is alert.   Psychiatric:         Mood and Affect: Mood normal.                    Signed Electronically by: VAMSI Lincoln CNP

## 2025-03-12 ENCOUNTER — OFFICE VISIT (OUTPATIENT)
Dept: PULMONOLOGY | Facility: CLINIC | Age: 85
End: 2025-03-12
Payer: MEDICARE

## 2025-03-12 VITALS
WEIGHT: 259 LBS | SYSTOLIC BLOOD PRESSURE: 147 MMHG | OXYGEN SATURATION: 91 % | HEART RATE: 90 BPM | BODY MASS INDEX: 35.13 KG/M2 | DIASTOLIC BLOOD PRESSURE: 76 MMHG

## 2025-03-12 DIAGNOSIS — J43.9 PULMONARY EMPHYSEMA, UNSPECIFIED EMPHYSEMA TYPE (H): ICD-10-CM

## 2025-03-12 DIAGNOSIS — G47.33 OBSTRUCTIVE SLEEP APNEA: Primary | ICD-10-CM

## 2025-03-12 DIAGNOSIS — J42 CHRONIC BRONCHITIS, UNSPECIFIED CHRONIC BRONCHITIS TYPE (H): ICD-10-CM

## 2025-03-12 DIAGNOSIS — R91.8 PULMONARY NODULES: ICD-10-CM

## 2025-03-12 LAB
DLCOCOR-%PRED-PRE: 80 %
DLCOCOR-PRE: 19.28 ML/MIN/MMHG
DLCOUNC-%PRED-PRE: 75 %
DLCOUNC-PRE: 18.1 ML/MIN/MMHG
DLCOUNC-PRED: 23.93 ML/MIN/MMHG
ERV-%PRED-PRE: 50 %
ERV-PRE: 0.64 L
ERV-PRED: 1.27 L
EXPTIME-PRE: 8.99 SEC
FEF2575-%PRED-POST: 47 %
FEF2575-%PRED-PRE: 60 %
FEF2575-POST: 0.84 L/SEC
FEF2575-PRE: 1.07 L/SEC
FEF2575-PRED: 1.76 L/SEC
FEFMAX-%PRED-PRE: 121 %
FEFMAX-PRE: 8 L/SEC
FEFMAX-PRED: 6.57 L/SEC
FEV1-%PRED-PRE: 86 %
FEV1-PRE: 2.22 L
FEV1FEV6-PRE: 69 %
FEV1FEV6-PRED: 75 %
FEV1FVC-PRE: 66 %
FEV1FVC-PRED: 75 %
FEV1SVC-PRE: 70 %
FEV1SVC-PRED: 67 %
FIFMAX-PRE: 6.02 L/SEC
FRCPLETH-%PRED-PRE: 82 %
FRCPLETH-PRE: 3.24 L
FRCPLETH-PRED: 3.93 L
FVC-%PRED-PRE: 96 %
FVC-PRE: 3.36 L
FVC-PRED: 3.47 L
IC-%PRED-PRE: 100 %
IC-PRE: 2.56 L
IC-PRED: 2.55 L
RVPLETH-%PRED-PRE: 86 %
RVPLETH-PRE: 2.6 L
RVPLETH-PRED: 3 L
TLCPLETH-%PRED-PRE: 80 %
TLCPLETH-PRE: 5.8 L
TLCPLETH-PRED: 7.23 L
VA-%PRED-PRE: 69 %
VA-PRE: 4.43 L
VC-%PRED-PRE: 83 %
VC-PRE: 3.2 L
VC-PRED: 3.81 L

## 2025-03-12 PROCEDURE — 99204 OFFICE O/P NEW MOD 45 MIN: CPT | Mod: 25 | Performed by: PHYSICIAN ASSISTANT

## 2025-03-12 PROCEDURE — 3078F DIAST BP <80 MM HG: CPT | Performed by: PHYSICIAN ASSISTANT

## 2025-03-12 PROCEDURE — 94726 PLETHYSMOGRAPHY LUNG VOLUMES: CPT | Performed by: INTERNAL MEDICINE

## 2025-03-12 PROCEDURE — 94729 DIFFUSING CAPACITY: CPT | Performed by: INTERNAL MEDICINE

## 2025-03-12 PROCEDURE — 1126F AMNT PAIN NOTED NONE PRSNT: CPT | Performed by: PHYSICIAN ASSISTANT

## 2025-03-12 PROCEDURE — 94060 EVALUATION OF WHEEZING: CPT | Performed by: INTERNAL MEDICINE

## 2025-03-12 PROCEDURE — 3077F SYST BP >= 140 MM HG: CPT | Performed by: PHYSICIAN ASSISTANT

## 2025-03-12 ASSESSMENT — PAIN SCALES - GENERAL: PAINLEVEL_OUTOF10: NO PAIN (0)

## 2025-03-12 NOTE — PROGRESS NOTES
"[facility]  New Pulmonary Consult    Name: Erich Craven MRN: [id number]     Age: 84 year old   YOB: 1940       Reason for Visit:   Chief Complaint   Patient presents with    New Patient     COPD and pulmonary nodules             Assessment and Plan:       # Emphysema  # shortness of breath  PFTs today actually show mild restriction, which is likely due to his body habitus since I don't see any evidence of parenchymal disease on CT chest 10/2024. Spirometry negative for obstruction, therefore he doesn't have classic COPD. CT chest 10/2024 does demonstrate emphysema and he as a ~34pack year smoking history, quit 1989. He subjectively benefits from Trelegy 200. Based on PFTs and symptoms without h/o AECOPD, I suspect he has mild early stage COPD and we can de-escalate to a LAMA-LABA or if too expensive (or less subjectively helpful), then Trelegy 100. SpO2 is on the low end of normal with exertion (charles 91% on RA on 6mwt 12/2023), which I suspect is cardiac related and possibly 2/2 pulmonary hypertension which he does have history of on chart review. Echo 2/2025- \"the IVC was not well visualized by appears dilated with abnormal respiratory variation in diameter, RA pressures may be elevated\". He is being followed by Cardiology, I would appreciate their input on this. Deconditioning may also be playing a role, he is in PT for lymphedema, continue.   --Inhaler therapy: Bevespi. OK to substitute with formulary equivalent per insurance.   --Continue PT and activity as tolerated  --Continue follow up with Cardiology, please consider assessing for pulmonary hypertension     # Lung nodules  Tiny 2-3mm lung nodules on CT chest 10/2024. Likely benign given small size. Recommend repeat CT chest 12 months  --CT chest 10/2025    # RIN  - Continue bipap + ?1L bled in  - Needs to re-establish care from [facility], referral placed and updated rx for supplies today    # Healthcare " maintenance  Immunizations: UTD flu, COVID booster, pneumonia and RSV      Return to clinic in 10/2025 with CT chest prior      58minutes spent reviewing chart, reviewing test results, talking with and examining patient, formulating plan, and documentation on the day of the encounter.     CHELSIE Saleem [facility], General Pulmonology            HPI:   Erich Craven is a 84 year old male with history of atrial fibrillation on Xarelto, provoked PE/DVT, HFpEF, CAD s/p stenting in 2014, sick sinus syndrome, pacemaker, RIN on BiPAP, DM2, CKD 3a who presents for evaluation of COPD.     Here to establish care for COPD/emphysema, used to be followed by [facility]. Breathing has been stable, no history of AECOPD recenlty but did have pneumonia many years ago. Endorses mild cough, with clear phlegm. Walks his dog twice daily and doing physical therapy for lymphedema without getting short of breath. Has to walk slow, and uses a cane. No nasal congestion. Denies wheezing or chest tightness, or chest pain. Diligently uses BiPAP +1L during sleep.  No orthopnea or PND.     Using Trelegy 200 daily, works very well for him. Infrequently uses albuterol, maybe every other week. Albuterol makes him jittery.   Lives in independent living.     10 point ROS performed and negative except for as noted in HPI.    Tobacco or vaping: quit smoking 1989, smoked 34 years up to 1ppd  Occupation: teacher at a [employer]  Exposures to mold, asbestos or radon: no  Pets: dog   Family history of lung cancer, or lung disease: no  Vaccines: UTD flu, COVID booster, PCV, RSV           Past Medical History:     Past Medical History:   Diagnosis Date    (HFpEF) heart failure with preserved ejection fraction (H)     Alcohol dependence in remission (H)     in recovery since 1989    Arthritis     BPH with urinary obstruction     CAD (coronary artery disease)     PCI w/ SUMMER in 2014    Cardiac pacemaker in situ     Chronic diastolic heart  failure (H)     Complication of anesthesia     post-op DVT 2012 after knee surgery    COPD (chronic obstructive pulmonary disease) (H)     Diabetes (H)     DVT (deep venous thrombosis) (H)     Dyspnea     Essential hypertension     Fatigue     HLD (hyperlipidemia)     Hx of long term use of blood thinners     Due to PE. Started in 2012    Hypothyroidism     Lymphedema     Mumps     Obesity, Class III, BMI 40-49.9 (morbid obesity) (H)     RIN (obstructive sleep apnea)     has refused CPAP    Pacemaker     Paroxysmal A-fib (H)     PE (pulmonary thromboembolism) (H)     Status post coronary angiogram 11/05/2019             Past Surgical History:      Past Surgical History:   Procedure Laterality Date    cardiac stenting      COLONOSCOPY  2010    CV RIGHT HEART CATH MEASUREMENTS RECORDED N/A 11/05/2019    Procedure: Right Heart Cath;  Surgeon: Roberto Hernandez MD;  Location:  [facility] CARD[facility] CATH LAB    ENT SURGERY      tonsillectomy    EP PACEMAKER GENERATOR REPLACEMENT- DUAL N/A 9/23/2024    Procedure: Pacemaker Generator Replacement Dual;  Surgeon: Denis Perez MD;  Location:  HEART CARDIAC CATH LAB    EYE SURGERY  2012    IMPLANT PACEMAKER      INJECT NERVE BLOCK SUPRASCAPULAR Bilateral 08/26/2021    Procedure: BLOCK, NERVE, SUPRASCAPULAR, Bilateral, under ultrasound guidance;  Surgeon: Swetha Allen MD;  Location: [facility] OR    INJECT NERVE BLOCK SUPRASCAPULAR Bilateral 09/23/2021    Procedure: bilateral suprascapular nerve block;  Surgeon: Swetha Allen MD;  Location: [facility] OR    INJECT NERVE BLOCK SUPRASCAPULAR Left 09/15/2022    Procedure: BLOCK, NERVE, SUPRASCAPULAR- left;  Surgeon: Swetha Allen MD;  Location: [facility] OR    INJECT NERVE BLOCK SUPRASCAPULAR Left 10/20/2022    Procedure: BLOCK, NERVE, SUPRASCAPULAR- left;  Surgeon: Swetha Allen MD;  Location: [facility] OR    INJECT NERVE BLOCK SUPRASCAPULAR Left 01/05/2023    Procedure: BLOCK, NERVE, SUPRASCAPULAR (left);  Surgeon: Swetha Allen MD;   Location: [facility] OR    NERVE BLOCK PERIPHERAL Bilateral 03/10/2022    Procedure: bilateral suprascapular nerve block;  Surgeon: Swetha Allen MD;  Location: [facility] OR    ORTHOPEDIC SURGERY Right 2012    knee replacement    REVERSE ARTHROPLASTY SHOULDER Left 2023    Procedure: ARTHROPLASTY, SHOULDER, TOTAL, REVERSE LEFT;  Surgeon: Katelyn Vasquez MD;  Location: [facility] OR    VASCULAR SURGERY               Social History:     Social History     Socioeconomic History    Marital status:      Spouse name: Not on file    Number of children: 5    Years of education: Not on file    Highest education level: Not on file   Occupational History    Occupation: retired teacher at [employer]   Tobacco Use    Smoking status: Former     Current packs/day: 0.00     Average packs/day: 0.5 packs/day for 30.0 years (15.0 ttl pk-yrs)     Types: Cigarettes     Start date: 6/15/1959     Quit date: 1989     Years since quittin.7     Passive exposure: Past    Smokeless tobacco: Never    Tobacco comments:     smoked from  to    Vaping Use    Vaping status: Never Used   Substance and Sexual Activity    Alcohol use: No     Comment: quit in ; recovering    Drug use: No    Sexual activity: Not Currently     Partners: Female   Other Topics Concern    Parent/sibling w/ CABG, MI or angioplasty before 65F 55M? No   Social History Narrative    Not on file     Social Drivers of Health     Financial Resource Strain: Low Risk  (1/3/2025)    Financial Resource Strain     Within the past 12 months, have you or your family members you live with been unable to get utilities (heat, electricity) when it was really needed?: No   Food Insecurity: Low Risk  (1/3/2025)    Food Insecurity     Within the past 12 months, did you worry that your food would run out before you got money to buy more?: No     Within the past 12 months, did the food you bought just not last and you didn t have money to get more?: No    Transportation Needs: Low Risk  (1/3/2025)    Transportation Needs     Within the past 12 months, has lack of transportation kept you from medical appointments, getting your medicines, non-medical meetings or appointments, work, or from getting things that you need?: No   Physical Activity: Sufficiently Active (1/3/2025)    Exercise Vital Sign     Days of Exercise per Week: 5 days     Minutes of Exercise per Session: 30 min   Stress: No Stress Concern Present (1/3/2025)    Citizen of [place] [employer] Occup[employer] - Occupational Stress Questionnaire     Feeling of Stress : Only a little   Social Connections: Unknown (1/3/2025)    Social Connection and Isolation Panel [NHANES]     Frequency of Communication with Friends and Family: Not on file     Frequency of Social Gatherings with Friends and Family: More than three times a week     Attends Yazidism Services: Not on file     Active Member of Clubs or Organizations: Not on file     Attends Club or Organization Meetings: Not on file     Marital Status: Not on file   Interpersonal Safety: Low Risk  (1/8/2025)    Interpersonal Safety     Do you feel physically and emotionally safe where you currently live?: Yes     Within the past 12 months, have you been hit, slapped, kicked or otherwise physically hurt by someone?: No     Within the past 12 months, have you been humiliated or emotionally abused in other ways by your partner or ex-partner?: No   Housing Stability: Low Risk  (1/3/2025)    Housing Stability     Do you have housing? : Yes     Are you worried about losing your housing?: No            Family History:     Family History   Problem Relation Age of Onset    Brain Cancer Mother     Other Cancer Mother     Anxiety Disorder Mother     Cerebrovascular Disease Father     Hypertension Father     Obesity Father     Substance Abuse Son     Osteoporosis Sister     Thyroid Disease Sister     Anesthesia Reaction No family hx of     Thrombosis No family hx of               Allergies:   No Known Allergies          Medications:     Current Outpatient Medications   Medication Sig Dispense Refill    acetaminophen (TYLENOL) 500 MG tablet Take 1,000 mg by mouth every 6 hours as needed for mild pain.      Acetylcarnitine HCl (ACETYL L-CARNITINE HCL PO) Take 1 capsule by mouth daily. Dose unknown      albuterol (PROAIR HFA/PROVENTIL HFA/VENTOLIN HFA) 108 (90 Base) MCG/ACT inhaler INHALE 2 PUFFS INTO THE LUNGS EVERY 6 HOURS AS NEEDED FOR SHORTNESS OF BREATH OR WHEEZING OR COUGH 18 g 2    amLODIPine (NORVASC) 5 MG tablet Take 1 tablet (5 mg) by mouth daily. 90 tablet 3    aspirin (ASA) 81 MG chewable tablet Take 1 tablet by mouth daily      atorvastatin (LIPITOR) 80 MG tablet Take 1 tablet (80 mg) by mouth every evening. 90 tablet 3    diltiazem ER COATED BEADS (CARDIZEM CD/CARTIA XT) 300 MG 24 hr capsule Take 1 capsule (300 mg) by mouth daily 90 capsule 3    ferrous sulfate 45 MG TBCR CR tablet TAKE 1 TABLET BY MOUTH EVERY OTHER DAY. 45 tablet 0    Fluticasone-Umeclidin-Vilanterol (TRELEGY ELLIPTA) 200-62.5-25 MCG/ACT oral inhaler INHALE 1 PUFF INTO THE LUNGS DAILY 60 each 3    furosemide (LASIX) 40 MG tablet Take 0.5 tablets (20 mg) by mouth 2 times daily. 90 tablet 2    levothyroxine (SYNTHROID/LEVOTHROID) 175 MCG tablet Take 1 tablet (175 mcg) by mouth six times a week. 2 tablet (350 mcg) on Fridays 102 tablet 1    lisinopril (ZESTRIL) 20 MG tablet Take 1 tablet (20 mg) by mouth 2 times daily. 180 tablet 3    metolazone (ZAROXOLYN) 2.5 MG tablet Take 1 tablet (2.5 mg) by mouth every other day. Taken 30 minutes prior to furosemide (Patient taking differently: Take 2.5 mg by mouth daily as needed. Taken 30 minutes prior to furosemide) 45 tablet 0    Multiple vitamin TABS Take 1 tablet by mouth daily      polyethylene glycol (MIRALAX) 17 g packet Take 17 g by mouth daily 7 packet 0    potassium chloride cammy ER (KLOR-CON M10) 10 MEQ CR tablet Take 1 tablet (10 mEq) by mouth 2 times daily.  (Patient taking differently: Take 20 mEq by mouth every morning. And 10mEq every evening) 180 tablet 3    rivaroxaban ANTICOAGULANT (XARELTO ANTICOAGULANT) 20 MG TABS tablet Take 1 tablet (20 mg) by mouth daily (with dinner). 90 tablet 1    sulfamethoxazole-trimethoprim (BACTRIM DS) 800-160 MG tablet Take 1 tablet by mouth 2 times daily for 14 days. 28 tablet 0    tamsulosin (FLOMAX) 0.4 MG capsule TAKE 1 CAPSULE(0.4 MG) BY MOUTH DAILY 90 capsule 0    TAURINE PO Take 1 capsule by mouth daily. Dose unknown      venlafaxine (EFFEXOR) 75 MG tablet TAKE 1 TABLET(75 MG) BY MOUTH TWICE DAILY 180 tablet 1     No current facility-administered medications for this visit.              Exam:   BP (!) 147/76 (BP Location: Left arm, Patient Position: Sitting, Cuff Size: Adult Large)   Pulse 90   Wt 117.5 kg (259 lb)   SpO2 (!) 91%   BMI 35.13 kg/m      Gen: well-appearing, NAD  CV: RRR, no murmurs  Resp: Normal respiratory effort on room air. Clear to auscultation bilaterally without wheezes, rales or ronchi.   Skin: no obvious rashes on gross evaluation  Extremities: lymphedema wraps  Neuro: alert and appropriate, no focal abnormalities         Data:     I have personally reviewed all pertinent labs, imaging studies and PFT results unless otherwise noted.      Imaging:  CT chest w/o 10/2024  1.  No evidence for pneumonia. Mild basilar atelectasis.  2.  Tiny pulmonary nodules. 12 month follow-up chest CT could be considered.  3.  Mild emphysema.  4.  Severe coronary calcifications.    PFT:  Mild restriction       6 mwt 12/2023

## 2025-03-12 NOTE — LETTER
"3/12/2025      Erich Craven  [address]  [place] [address]      Dear Colleague,    Thank you for referring your patient, Erich Craven, to the [facility] [place]. Please see a copy of my visit note below.    [facility]- Pulmonary Clinic  New Pulmonary Consult    Name: Erich Craven MRN: [id number]     Age: 84 year old   YOB: 1940       Reason for Visit:   Chief Complaint   Patient presents with     New Patient     COPD and pulmonary nodules             Assessment and Plan:       # Emphysema  # shortness of breath  PFTs today actually show mild restriction, which is likely due to his body habitus since I don't see any evidence of parenchymal disease on CT chest 10/2024. Spirometry negative for obstruction, therefore he doesn't have classic COPD. CT chest 10/2024 does demonstrate emphysema and he as a ~34pack year smoking history, quit 1989. He subjectively benefits from Trelegy 200. Based on PFTs and symptoms without h/o AECOPD, I suspect he has mild early stage COPD and we can de-escalate to a LAMA-LABA or if too expensive (or less subjectively helpful), then Trelegy 100. SpO2 is on the low end of normal with exertion (charles 91% on RA on 6mwt 12/2023), which I suspect is cardiac related and possibly 2/2 pulmonary hypertension which he does have history of on chart review. Echo 2/2025- \"the IVC was not well visualized by appears dilated with abnormal respiratory variation in diameter, RA pressures may be elevated\". He is being followed by Cardiology, I would appreciate their input on this. Deconditioning may also be playing a role, he is in PT for lymphedema, continue.   --Inhaler therapy: Bevespi. OK to substitute with formulary equivalent per insurance.   --Continue PT and activity as tolerated  --Continue follow up with Cardiology, please consider assessing for pulmonary hypertension     # Lung nodules  Tiny 2-3mm lung nodules on CT chest 10/2024. " Likely benign given small size. Recommend repeat CT chest 12 months  --CT chest 10/2025    # RIN  - Continue bipap + ?1L bled in  - Needs to re-establish care from [facility], referral placed and updated rx for supplies today    # Healthcare maintenance  Immunizations: UTD flu, COVID booster, pneumonia and RSV      Return to clinic in 10/2025 with CT chest prior      58minutes spent reviewing chart, reviewing test results, talking with and examining patient, formulating plan, and documentation on the day of the encounter.     CHELSIE [facility], General Pulmonology            HPI:   Erich Craven is a 84 year old male with history of atrial fibrillation on Xarelto, provoked PE/DVT, HFpEF, CAD s/p stenting in 2014, sick sinus syndrome, pacemaker, RIN on BiPAP, DM2, CKD 3a who presents for evaluation of COPD.     Here to establish care for COPD/emphysema, used to be followed by [facility]. Breathing has been stable, no history of AECOPD recenlty but did have pneumonia many years ago. Endorses mild cough, with clear phlegm. Walks his dog twice daily and doing physical therapy for lymphedema without getting short of breath. Has to walk slow, and uses a cane. No nasal congestion. Denies wheezing or chest tightness, or chest pain. Diligently uses BiPAP +1L during sleep.  No orthopnea or PND.     Using Trelegy 200 daily, works very well for him. Infrequently uses albuterol, maybe every other week. Albuterol makes him jittery.   Lives in independent living.     10 point ROS performed and negative except for as noted in HPI.    Tobacco or vaping: quit smoking 1989, smoked 34 years up to 1ppd  Occupation: teacher at a [employer]  Exposures to mold, asbestos or radon: no  Pets: dog   Family history of lung cancer, or lung disease: no  Vaccines: UTD flu, COVID booster, PCV, RSV           Past Medical History:     Past Medical History:   Diagnosis Date     (HFpEF) heart failure with preserved ejection  fraction (H)      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 Started in 2012     Hypothyroidism      Lymphedema      Mumps      Obesity, Class III, BMI 40-49.9 (morbid obesity) (H)      RIN (obstructive sleep apnea)     has refused CPAP     Pacemaker      Paroxysmal A-fib (H)      PE (pulmonary thromboembolism) (H)      Status post coronary angiogram 11/05/2019             Past Surgical History:      Past Surgical History:   Procedure Laterality Date     cardiac stenting       COLONOSCOPY  2010     CV RIGHT HEART CATH MEASUREMENTS RECORDED N/A 11/05/2019    Procedure: Right Heart Cath;  Surgeon: Roberto Hernandez MD;  Location: [facility] CARDIAC CATH LAB     ENT SURGERY      tonsillectomy     EP PACEMAKER GENERATOR REPLACEMENT- DUAL N/A 9/23/2024    Procedure: Pacemaker Generator Replacement Dual;  Surgeon: Denis Perez MD;  Location: [facility] CARDIAC CATH LAB     EYE SURGERY  2012     IMPLANT PACEMAKER       INJECT NERVE BLOCK SUPRASCAPULAR Bilateral 08/26/2021    Procedure: BLOCK, NERVE, SUPRASCAPULAR, Bilateral, under ultrasound guidance;  Surgeon: Swetha Allen MD;  Location: [facility] OR     INJECT NERVE BLOCK SUPRASCAPULAR Bilateral 09/23/2021    Procedure: bilateral suprascapular nerve block;  Surgeon: Swetha Allen MD;  Location: [facility] OR     INJECT NERVE BLOCK SUPRASCAPULAR Left 09/15/2022    Procedure: BLOCK, NERVE, SUPRASCAPULAR- left;  Surgeon: Swetha Allen MD;  Location: [facility] OR     INJECT  NERVE BLOCK SUPRASCAPULAR Left 10/20/2022    Procedure: BLOCK, NERVE, SUPRASCAPULAR- left;  Surgeon: Swetha Allen MD;  Location: [facility] OR     INJECT NERVE BLOCK SUPRASCAPULAR Left 2023    Procedure: BLOCK, NERVE, SUPRASCAPULAR (left);  Surgeon: Swetha Allen MD;  Location: [facility] OR     NERVE BLOCK PERIPHERAL Bilateral 03/10/2022    Procedure: bilateral suprascapular nerve block;  Surgeon: Swetha Allen MD;  Location: [facility] OR     ORTHOPEDIC SURGERY Right 2012    knee replacement     REVERSE ARTHROPLASTY SHOULDER Left 2023    Procedure: ARTHROPLASTY, SHOULDER, TOTAL, REVERSE LEFT;  Surgeon: Katelyn Vasquez MD;  Location: [facility] OR     VASCULAR SURGERY               Social History:     Social History     Socioeconomic History     Marital status:      Spouse name: Not on file     Number of children: 5     Years of education: Not on file     Highest education level: Not on file   Occupational History     Occupation: retired teacher at [employer]   Tobacco Use     Smoking status: Former     Current packs/day: 0.00     Average packs/day: 0.5 packs/day for 30.0 years (15.0 ttl pk-yrs)     Types: Cigarettes     Start date: 6/15/1959     Quit date: 1989     Years since quittin.7     Passive exposure: Past     Smokeless tobacco: Never     Tobacco comments:     smoked from  to    Vaping Use     Vaping status: Never Used   Substance and Sexual Activity     Alcohol use: No     Comment: quit in ; recovering     Drug use: No     Sexual activity: Not Currently     Partners: Female   Other Topics Concern     Parent/sibling w/ CABG, MI or angioplasty before 65F 55M? No   Social History Narrative     Not on file     Social Drivers of Health     Financial Resource Strain: Low Risk  (1/3/2025)    Financial Resource Strain      Within the past 12 months, have you or your family members you live with been unable to get utilities (heat, electricity) when it was really needed?: No    Food Insecurity: Low Risk  (1/3/2025)    Food Insecurity      Within the past 12 months, did you worry that your food would run out before you got money to buy more?: No      Within the past 12 months, did the food you bought just not last and you didn t have money to get more?: No   Transportation Needs: Low Risk  (1/3/2025)    Transportation Needs      Within the past 12 months, has lack of transportation kept you from medical appointments, getting your medicines, non-medical meetings or appointments, work, or from getting things that you need?: No   Physical Activity: Sufficiently Active (1/3/2025)    Exercise Vital Sign      Days of Exercise per Week: 5 days      Minutes of Exercise per Session: 30 min   Stress: No Stress Concern Present (1/3/2025)    [place] [employer] - Occupational Stress Questionnaire      Feeling of Stress : Only a little   Social Connections: Unknown (1/3/2025)    Social Connection and Isolation Panel [NHANES]      Frequency of Communication with Friends and Family: Not on file      Frequency of Social Gatherings with Friends and Family: More than three times a week      Attends Evangelical Services: Not on file      Active Member of Clubs or Organizations: Not on file      Attends Club or Organization Meetings: Not on file      Marital Status: Not on file   Interpersonal Safety: Low Risk  (1/8/2025)    Interpersonal Safety      Do you feel physically and emotionally safe where you currently live?: Yes      Within the past 12 months, have you been hit, slapped, kicked or otherwise physically hurt by someone?: No      Within the past 12 months, have you been humiliated or emotionally abused in other ways by your partner or ex-partner?: No   Housing Stability: Low Risk  (1/3/2025)    Housing Stability      Do you have housing? : Yes      Are you worried about losing your housing?: No            Family History:     Family History   Problem Relation Age of Onset     Brain  Cancer Mother      Other Cancer Mother      Anxiety Disorder Mother      Cerebrovascular Disease Father      Hypertension Father      Obesity Father      Substance Abuse Son      Osteoporosis Sister      Thyroid Disease Sister      Anesthesia Reaction No family hx of      Thrombosis No family hx of              Allergies:   No Known Allergies          Medications:     Current Outpatient Medications   Medication Sig Dispense Refill     acetaminophen (TYLENOL) 500 MG tablet Take 1,000 mg by mouth every 6 hours as needed for mild pain.       Acetylcarnitine HCl (ACETYL L-CARNITINE HCL PO) Take 1 capsule by mouth daily. Dose unknown       albuterol (PROAIR HFA/PROVENTIL HFA/VENTOLIN HFA) 108 (90 Base) MCG/ACT inhaler INHALE 2 PUFFS INTO THE LUNGS EVERY 6 HOURS AS NEEDED FOR SHORTNESS OF BREATH OR WHEEZING OR COUGH 18 g 2     amLODIPine (NORVASC) 5 MG tablet Take 1 tablet (5 mg) by mouth daily. 90 tablet 3     aspirin (ASA) 81 MG chewable tablet Take 1 tablet by mouth daily       atorvastatin (LIPITOR) 80 MG tablet Take 1 tablet (80 mg) by mouth every evening. 90 tablet 3     diltiazem ER COATED BEADS (CARDIZEM CD/CARTIA XT) 300 MG 24 hr capsule Take 1 capsule (300 mg) by mouth daily 90 capsule 3     ferrous sulfate 45 MG TBCR CR tablet TAKE 1 TABLET BY MOUTH EVERY OTHER DAY. 45 tablet 0     Fluticasone-Umeclidin-Vilanterol (TRELEGY ELLIPTA) 200-62.5-25 MCG/ACT oral inhaler INHALE 1 PUFF INTO THE LUNGS DAILY 60 each 3     furosemide (LASIX) 40 MG tablet Take 0.5 tablets (20 mg) by mouth 2 times daily. 90 tablet 2     levothyroxine (SYNTHROID/LEVOTHROID) 175 MCG tablet Take 1 tablet (175 mcg) by mouth six times a week. 2 tablet (350 mcg) on Fridays 102 tablet 1     lisinopril (ZESTRIL) 20 MG tablet Take 1 tablet (20 mg) by mouth 2 times daily. 180 tablet 3     metolazone (ZAROXOLYN) 2.5 MG tablet Take 1 tablet (2.5 mg) by mouth every other day. Taken 30 minutes prior to furosemide (Patient taking differently: Take 2.5 mg  by mouth daily as needed. Taken 30 minutes prior to furosemide) 45 tablet 0     Multiple vitamin TABS Take 1 tablet by mouth daily       polyethylene glycol (MIRALAX) 17 g packet Take 17 g by mouth daily 7 packet 0     potassium chloride cammy ER (KLOR-CON M10) 10 MEQ CR tablet Take 1 tablet (10 mEq) by mouth 2 times daily. (Patient taking differently: Take 20 mEq by mouth every morning. And 10mEq every evening) 180 tablet 3     rivaroxaban ANTICOAGULANT (XARELTO ANTICOAGULANT) 20 MG TABS tablet Take 1 tablet (20 mg) by mouth daily (with dinner). 90 tablet 1     sulfamethoxazole-trimethoprim (BACTRIM DS) 800-160 MG tablet Take 1 tablet by mouth 2 times daily for 14 days. 28 tablet 0     tamsulosin (FLOMAX) 0.4 MG capsule TAKE 1 CAPSULE(0.4 MG) BY MOUTH DAILY 90 capsule 0     TAURINE PO Take 1 capsule by mouth daily. Dose unknown       venlafaxine (EFFEXOR) 75 MG tablet TAKE 1 TABLET(75 MG) BY MOUTH TWICE DAILY 180 tablet 1     No current facility-administered medications for this visit.              Exam:   BP (!) 147/76 (BP Location: Left arm, Patient Position: Sitting, Cuff Size: Adult Large)   Pulse 90   Wt 117.5 kg (259 lb)   SpO2 (!) 91%   BMI 35.13 kg/m      Gen: well-appearing, NAD  CV: RRR, no murmurs  Resp: Normal respiratory effort on room air. Clear to auscultation bilaterally without wheezes, rales or ronchi.   Skin: no obvious rashes on gross evaluation  Extremities: lymphedema wraps  Neuro: alert and appropriate, no focal abnormalities         Data:     I have personally reviewed all pertinent labs, imaging studies and PFT results unless otherwise noted.      Imaging:  CT chest w/o 10/2024  1.  No evidence for pneumonia. Mild basilar atelectasis.  2.  Tiny pulmonary nodules. 12 month follow-up chest CT could be considered.  3.  Mild emphysema.  4.  Severe coronary calcifications.    PFT:  Mild restriction       6 mwt 12/2023        Again, thank you for allowing me to participate in the care of your  patient.        Sincerely,        Nicole Haro PA-C    Electronically signed

## 2025-03-12 NOTE — NURSING NOTE
Chief Complaint   Patient presents with    New Patient     COPD and pulmonary nodules       Vitals:    03/12/25 1249   BP: (!) 147/76   BP Location: Left arm   Patient Position: Sitting   Cuff Size: Adult Large   Pulse: 90   SpO2: (!) 91%   Weight: 117.5 kg (259 lb)       Body mass index is 35.13 kg/m .    Jo Ann Mixon, [facility]

## 2025-03-12 NOTE — PATIENT INSTRUCTIONS
I updated your prescription for Bipap supplies to [facility]; [phone]     Alternatives to Trelegy 100; Bevespi (2 puffs twice daily), Michael York    CT chest 10/2025

## 2025-03-17 ENCOUNTER — THERAPY VISIT (OUTPATIENT)
Dept: PHYSICAL THERAPY | Facility: CLINIC | Age: 85
End: 2025-03-17
Payer: MEDICARE

## 2025-03-17 DIAGNOSIS — R26.2 DIFFICULTY WALKING: ICD-10-CM

## 2025-03-17 DIAGNOSIS — R29.898 LEG WEAKNESS, BILATERAL: ICD-10-CM

## 2025-03-17 DIAGNOSIS — R26.89 IMPAIRMENT OF BALANCE: Primary | ICD-10-CM

## 2025-03-17 DIAGNOSIS — R26.89 BALANCE PROBLEM: ICD-10-CM

## 2025-03-17 PROCEDURE — 97530 THERAPEUTIC ACTIVITIES: CPT | Mod: GP | Performed by: PHYSICAL THERAPIST

## 2025-03-17 PROCEDURE — 97110 THERAPEUTIC EXERCISES: CPT | Mod: GP | Performed by: PHYSICAL THERAPIST

## 2025-03-17 NOTE — PROGRESS NOTES
"   03/17/25 0500   Appointment Info   Signing clinician's name / credentials Newton Brown, PT, DPT, NCS   Visits Used 9   Medical Diagnosis At moderate fall risk: Weakness of lower extremity, unspecified laterality   PT Tx Diagnosis B LE weakness, impaired balance and mobility   Progress Note/Certification   Start of Care Date 12/04/24   Onset of illness/injury or Date of Surgery 10/30/24   Therapy Frequency 1x/wk   Predicted Duration 12 weeks   Certification date from 02/27/25   Certification date to 03/17/25   GOALS   PT Goals 2;3;4   PT Goal 1   Goal Identifier Gait speed   Goal Description Pt will ambulate at a comfortable gait speed of >0.55 m/s with AAD   Rationale to maximize safety and independence within the home;to maximize safety and independence within the community   Goal Progress MET: Pt progressed from 0.45 m/s with SEC to 0.56 m/s with SEC to 0.72 m/s without AD.   Target Date 02/26/25   Date Met 02/19/25   PT Goal 2   Goal Identifier Sit to stand   Goal Description Pt will complete 1 stand without UE A from standard height chair   Rationale to maximize safety and independence with performance of ADLs and functional tasks   Goal Progress PARTIALLY MET: Pt required single UE support for sit to stand, however he is able to stand without UE A from a 18\" height chair   Target Date 02/26/25   Date Met 03/17/25   PT Goal 3   Goal Identifier TUG   Goal Description Pt will complete TUG in <20 seocnds with AAD in order to reduce fall risk   Rationale to maximize safety and independence within the home;to maximize safety and independence with performance of ADLs and functional tasks   Goal Progress MET: 24.37 seconds without AD to 19.59 seconds without AD, 18.32 seconds with SEC to 17.09 seconds without AD.   Target Date 02/26/25   Date Met 02/19/25   PT Goal 4   Goal Identifier HEP   Goal Description pt will be independent with a HEP to self manage symptoms   Goal Progress MET: Pt has been provided " "updated HEP and has been able to maintain gains in therapy despite a month break   Target Date 25   Date Met 25   Subjective Report   Subjective Report Pt reports that since his last visit he had a fall backwards on the stairs to his daughter's house. He injured his knee and developed a cellulius infection. Needed to take antibiotics and then developed a UTI therefore unable to attend previously scheduled appointment about a month ago   Therapeutic Procedure/Exercise   Therapeutic Procedures: strength, endurance, ROM, flexibility minutes (80845) 30   Ther Proc 1 Sit to stand   Ther Proc 1 - Details from 17\" chair - required single UE support.  From 18\" surfaces - no UE support needed.  from EOM x 5 reps with arms outstretched   PTRx Ther Proc 1 Seated LAQ   PTRx Ther Proc 1 - Details x 10 reps with 3 sec holds on each side - good form   PTRx Ther Proc 2 Bridging   PTRx Ther Proc 2 - Details x 8 reps - VC for DF to avoid hamstring cramp - good form   PTRx Ther Proc 3 supine SLR   PTRx Ther Proc 3 - Details x 10 reps each side - good form.   PTRx Ther Proc 4 Supine quad set   PTRx Ther Proc 4 - Details x 10 reps with 5 sec holds each side   Skilled Intervention In order to address weakness to assist with balance tolerance and reactions as well as functional mobility   Patient Response/Progress good form and compliance with HEP   Therapeutic Activity   Therapeutic Activities: dynamic activities to improve functional performance minutes (52948) 10   Ther Act 1 - Details gait speed: 0.71 m/s without AD, TU.09 seconds. Completed without AD.  Pt demonstrated improved scores since last session.   Narrow SUZY: eyes open x 45 seconds, head turns x 8rpes, head nods x 8 reps. Semi tandem stance x 30 seconds then alternating arm reach overhead. Completed with each foot forward.   Skilled Intervention In order to assess progress and functional activities   Patient Response/Progress Pt has demonstrated continued " impovements with gait speed and TUG with the month of no therapy due to infections.   Education   Learner/Method Patient;Demonstration;Pictures/Video;No Barriers to Learning   Plan   Home program see PTRx   Plan for next session discharge from PT   Comments   Comments Pt provided with knee brace order and instructed to reach out to orthotics for proper brace   Total Session Time   Timed Code Treatment Minutes 40   Total Treatment Time (sum of timed and untimed services) 40         [facility]                                                                                   OUTPATIENT PHYSICAL THERAPY    PLAN OF TREATMENT FOR OUTPATIENT REHABILITATION   Patient's Last Name, First Name, JAZMYNEGAUDENCIO RizobreaErich NICKERSON YOB: 1940   Provider's Name   [facility]   Medical Record No.  [id number]     Onset Date: 10/30/24  Start of Care Date: 12/04/24     Medical Diagnosis:  At moderate fall risk: Weakness of lower extremity, unspecified laterality      PT Treatment Diagnosis:  B LE weakness, impaired balance and mobility Plan of Treatment  Frequency/Duration: 1 visit/ 3 weeks    Certification date from 02/27/25 to 03/17/25         See note for plan of treatment details and functional goals     Newton Brown, PT                         I CERTIFY THE NEED FOR THESE SERVICES FURNISHED UNDER        THIS PLAN OF TREATMENT AND WHILE UNDER MY CARE     (Physician attestation of this document indicates review and certification of the therapy plan).              Referring Provider:  Anne Heath    Initial Assessment  See Epic Evaluation- Start of Care Date: 12/04/24            DISCHARGE  Reason for Discharge: Patient has met all goals.    Equipment Issued: none.     Discharge Plan: Patient to continue home program.    Referring Provider:  Anne Heath

## 2025-03-18 ENCOUNTER — THERAPY VISIT (OUTPATIENT)
Dept: OCCUPATIONAL THERAPY | Facility: CLINIC | Age: 85
End: 2025-03-18
Payer: MEDICARE

## 2025-03-18 DIAGNOSIS — I89.0 LYMPHEDEMA: Primary | ICD-10-CM

## 2025-03-18 PROCEDURE — 97140 MANUAL THERAPY 1/> REGIONS: CPT | Mod: GO | Performed by: OCCUPATIONAL THERAPIST

## 2025-03-20 NOTE — TELEPHONE ENCOUNTER
ANTICOAGULATION     Erich Craven is overdue for INR check.      Spoke with Jaun and scheduled lab appointment on 8/4/2021    Peggy Ricci RN    
no known allergies

## 2025-03-26 ENCOUNTER — MYC MEDICAL ADVICE (OUTPATIENT)
Dept: CARDIOLOGY | Facility: CLINIC | Age: 85
End: 2025-03-26
Payer: MEDICARE

## 2025-03-26 DIAGNOSIS — I10 ESSENTIAL HYPERTENSION: ICD-10-CM

## 2025-03-27 RX ORDER — AMLODIPINE BESYLATE 5 MG/1
5 TABLET ORAL DAILY
Qty: 90 TABLET | Refills: 3 | Status: SHIPPED | OUTPATIENT
Start: 2025-03-27

## 2025-03-31 ENCOUNTER — MYC MEDICAL ADVICE (OUTPATIENT)
Dept: FAMILY MEDICINE | Facility: CLINIC | Age: 85
End: 2025-03-31
Payer: MEDICARE

## 2025-03-31 ENCOUNTER — MYC MEDICAL ADVICE (OUTPATIENT)
Dept: PULMONOLOGY | Facility: CLINIC | Age: 85
End: 2025-03-31
Payer: MEDICARE

## 2025-03-31 DIAGNOSIS — E87.6 HYPOKALEMIA: ICD-10-CM

## 2025-03-31 DIAGNOSIS — J43.9 PULMONARY EMPHYSEMA, UNSPECIFIED EMPHYSEMA TYPE (H): Primary | ICD-10-CM

## 2025-03-31 RX ORDER — POTASSIUM CHLORIDE 750 MG/1
10 TABLET, EXTENDED RELEASE ORAL 2 TIMES DAILY
Qty: 180 TABLET | Refills: 3 | OUTPATIENT
Start: 2025-03-31

## 2025-03-31 NOTE — TELEPHONE ENCOUNTER
Per LOV with Nicole on 3/12/25  Based on PFTs and symptoms without h/o AECOPD, I suspect he has mild early stage COPD and we can de-escalate to a LAMA-LABA or if too expensive (or less subjectively helpful), then Trelegy 100.     Pended Trelegy 100 and routed to Nicole to review.    Cl Santos, RN

## 2025-04-01 DIAGNOSIS — E87.6 HYPOKALEMIA: ICD-10-CM

## 2025-04-01 RX ORDER — POTASSIUM CHLORIDE 750 MG/1
20 TABLET, EXTENDED RELEASE ORAL EVERY MORNING
Qty: 270 TABLET | Refills: 1 | Status: SHIPPED | OUTPATIENT
Start: 2025-04-01

## 2025-04-01 NOTE — TELEPHONE ENCOUNTER
Pended update script for pt's potassium as 20meq AM, 10meq PM  Please review if appropriate.  Martina Ackerman, RN  [facility] RN Triage Team

## 2025-04-08 ENCOUNTER — MYC REFILL (OUTPATIENT)
Dept: CARDIOLOGY | Facility: CLINIC | Age: 85
End: 2025-04-08
Payer: MEDICARE

## 2025-04-08 DIAGNOSIS — I10 PRIMARY HYPERTENSION: ICD-10-CM

## 2025-04-08 RX ORDER — LISINOPRIL 20 MG/1
20 TABLET ORAL 2 TIMES DAILY
Qty: 180 TABLET | Refills: 3 | Status: SHIPPED | OUTPATIENT
Start: 2025-04-08

## 2025-04-08 RX ORDER — POTASSIUM CHLORIDE 750 MG/1
TABLET, EXTENDED RELEASE ORAL
Qty: 270 TABLET | Refills: 3 | Status: SHIPPED | OUTPATIENT
Start: 2025-04-08

## 2025-04-08 NOTE — TELEPHONE ENCOUNTER
Okay for same-day or can also schedule with any available provider in the team.  We need records of the previous disability form.  Please advise patient to read the form he can print out from, there are 10 questions we need to respond.  He will need to fill the form pertaining to his information.

## 2025-04-08 NOTE — TELEPHONE ENCOUNTER
Please advise I usually request office visit to complete disability parking certificate there are many questions we have to appropriately address and sign appropriately..  Has patient had similar form filled completed in the past, we need a record of such.  Thank you for follow-up

## 2025-04-08 NOTE — PROGRESS NOTES
[facility] Cardiology Refill Guideline reviewed.  Medication meets criteria for refill. Mary Odell RN on 4/8/2025 at 1:54 PM

## 2025-04-09 ENCOUNTER — OFFICE VISIT (OUTPATIENT)
Dept: FAMILY MEDICINE | Facility: CLINIC | Age: 85
End: 2025-04-09
Payer: MEDICARE

## 2025-04-09 VITALS
RESPIRATION RATE: 18 BRPM | WEIGHT: 261.9 LBS | OXYGEN SATURATION: 94 % | HEIGHT: 72 IN | HEART RATE: 69 BPM | SYSTOLIC BLOOD PRESSURE: 123 MMHG | TEMPERATURE: 98 F | DIASTOLIC BLOOD PRESSURE: 69 MMHG | BODY MASS INDEX: 35.47 KG/M2

## 2025-04-09 DIAGNOSIS — Z91.81 AT MODERATE RISK FOR FALL: ICD-10-CM

## 2025-04-09 DIAGNOSIS — E03.9 HYPOTHYROIDISM, UNSPECIFIED TYPE: ICD-10-CM

## 2025-04-09 DIAGNOSIS — R26.89 BALANCE PROBLEMS: Primary | ICD-10-CM

## 2025-04-09 DIAGNOSIS — M17.12 PRIMARY OSTEOARTHRITIS OF LEFT KNEE: ICD-10-CM

## 2025-04-09 DIAGNOSIS — E11.9 TYPE 2 DIABETES MELLITUS WITHOUT COMPLICATION, WITHOUT LONG-TERM CURRENT USE OF INSULIN (H): ICD-10-CM

## 2025-04-09 LAB
ALBUMIN SERPL BCG-MCNC: 4.5 G/DL (ref 3.5–5.2)
ALP SERPL-CCNC: 137 U/L (ref 40–150)
ALT SERPL W P-5'-P-CCNC: 20 U/L (ref 0–70)
ANION GAP SERPL CALCULATED.3IONS-SCNC: 12 MMOL/L (ref 7–15)
AST SERPL W P-5'-P-CCNC: 23 U/L (ref 0–45)
BILIRUB SERPL-MCNC: 0.5 MG/DL
BUN SERPL-MCNC: 20.4 MG/DL (ref 8–23)
CALCIUM SERPL-MCNC: 9.7 MG/DL (ref 8.8–10.4)
CHLORIDE SERPL-SCNC: 100 MMOL/L (ref 98–107)
CREAT SERPL-MCNC: 1.09 MG/DL (ref 0.67–1.17)
EGFRCR SERPLBLD CKD-EPI 2021: 67 ML/MIN/1.73M2
EST. AVERAGE GLUCOSE BLD GHB EST-MCNC: 108 MG/DL
GLUCOSE SERPL-MCNC: 95 MG/DL (ref 70–99)
HBA1C MFR BLD: 5.4 % (ref 0–5.6)
HCO3 SERPL-SCNC: 27 MMOL/L (ref 22–29)
POTASSIUM SERPL-SCNC: 4.2 MMOL/L (ref 3.4–5.3)
PROT SERPL-MCNC: 7.2 G/DL (ref 6.4–8.3)
SODIUM SERPL-SCNC: 139 MMOL/L (ref 135–145)
T4 FREE SERPL-MCNC: 0.54 NG/DL (ref 0.9–1.7)
TSH SERPL DL<=0.005 MIU/L-ACNC: 31.1 UIU/ML (ref 0.3–4.2)

## 2025-04-09 PROCEDURE — 3078F DIAST BP <80 MM HG: CPT | Performed by: INTERNAL MEDICINE

## 2025-04-09 PROCEDURE — 80053 COMPREHEN METABOLIC PANEL: CPT | Performed by: INTERNAL MEDICINE

## 2025-04-09 PROCEDURE — 3074F SYST BP LT 130 MM HG: CPT | Performed by: INTERNAL MEDICINE

## 2025-04-09 PROCEDURE — 99214 OFFICE O/P EST MOD 30 MIN: CPT | Performed by: INTERNAL MEDICINE

## 2025-04-09 PROCEDURE — 84439 ASSAY OF FREE THYROXINE: CPT | Performed by: INTERNAL MEDICINE

## 2025-04-09 PROCEDURE — 83036 HEMOGLOBIN GLYCOSYLATED A1C: CPT | Performed by: INTERNAL MEDICINE

## 2025-04-09 PROCEDURE — 36415 COLL VENOUS BLD VENIPUNCTURE: CPT | Performed by: INTERNAL MEDICINE

## 2025-04-09 PROCEDURE — 84443 ASSAY THYROID STIM HORMONE: CPT | Performed by: INTERNAL MEDICINE

## 2025-04-09 PROCEDURE — 1126F AMNT PAIN NOTED NONE PRSNT: CPT | Performed by: INTERNAL MEDICINE

## 2025-04-09 ASSESSMENT — PAIN SCALES - GENERAL: PAINLEVEL_OUTOF10: NO PAIN (0)

## 2025-04-09 NOTE — PROGRESS NOTES
Assessment & Plan   Problem List Items Addressed This Visit       Hypothyroidism    Relevant Orders    TSH with free T4 reflex (Completed)    T4 free (Completed)    Type 2 diabetes mellitus without complication, without long-term current use of insulin (H)    Relevant Orders    HEMOGLOBIN A1C (Completed)    Comprehensive metabolic panel (BMP + Alb, Alk Phos, ALT, AST, Total. Bili, TP) (Completed)    Primary osteoarthritis of left knee     Other Visit Diagnoses       Balance problems    -  Primary    At moderate risk for fall               Patient has left leg weakness due to knee pain, uses a cane for ambulation is getting PT.  He is at moderate risk of falls due to imbalance issues.  We completed disability parking certificate with above conditions.  Continue with PT.  Reviewed x-ray that has moderate to severe degenerative arthritic changes  Patient agrees to check labs given his diabetes and hypothyroidism.  Continue monitor potassium/kidney function.  Hemodynamics are stable.  All questions answered       See Patient Instructions      Vernell Zamorano is a 84 year old, presenting for the following health issues:  Form Request and Recheck Medication        4/9/2025     3:17 PM   Additional Questions   Roomed by Willie   Accompanied by Not applicable, by themselves         4/9/2025   Forms   Any forms needing to be completed Yes     History of Present Illness       Reason for visit:  Review medications and renew disability certificate    He eats 0-1 servings of fruits and vegetables daily.He consumes 2 sweetened beverage(s) daily.He exercises with enough effort to increase his heart rate 20 to 29 minutes per day.    He is taking medications regularly.          Review of Systems  Constitutional, HEENT, cardiovascular, pulmonary, gi and gu systems are negative, except as otherwise noted.      Objective    /69 (BP Location: Right arm)   Pulse 69   Temp 98  F (36.7  C) (Temporal)   Resp 18   Ht 1.829 m  (6')   Wt 118.8 kg (261 lb 14.4 oz)   SpO2 94%   BMI 35.52 kg/m    Body mass index is 35.52 kg/m .  Physical Exam   GENERAL: alert and no distress, very pleasant  EYES: Eyes grossly normal to inspection, PERRL and conjunctivae and sclerae normal    RESP: lungs clear to auscultation - no rales, rhonchi or wheezes  CV: regular rate and rhythm, normal S1 S2, no S3 or S4, no murmur, click or rub, no peripheral edema  ABDOMEN: soft, nontender, no hepatosplenomegaly, no masses and bowel sounds normal  MS: Has arthritic deformities of left knee.  Has some left leg proximal weakness.  Has significant edema bilateral.  SKIN: no suspicious lesions or rashes  NEURO: Normal strength and tone, mentation intact and speech normal.  Slight imbalance with walking due to left knee pain/lymphedema/muscle weakness, uses cane for ambulation.  PSYCH: mentation appears normal, affect normal/bright    Orders Only on 03/12/2025   Component Date Value Ref Range Status    FVC-Pred 03/12/2025 3.47  L Final    FVC-Pre 03/12/2025 3.36  L Final    FVC-%Pred-Pre 03/12/2025 96  % Final    FEV1-Pre 03/12/2025 2.22  L Final    FEV1-%Pred-Pre 03/12/2025 86  % Final    FEV1FVC-Pred 03/12/2025 75  % Final    FEV1FVC-Pre 03/12/2025 66  % Final    FEFMax-Pred 03/12/2025 6.57  L/sec Final    FEFMax-Pre 03/12/2025 8.00  L/sec Final    FEFMax-%Pred-Pre 03/12/2025 121  % Final    FEF2575-Pred 03/12/2025 1.76  L/sec Final    FEF2575-Pre 03/12/2025 1.07  L/sec Final    EPP6094-%Pred-Pre 03/12/2025 60  % Final    FEF2575-Post 03/12/2025 0.84  L/sec Final    ZTM5135-%Pred-Post 03/12/2025 47  % Final    ExpTime-Pre 03/12/2025 8.99  sec Final    FIFMax-Pre 03/12/2025 6.02  L/sec Final    VC-Pred 03/12/2025 3.81  L Final    VC-Pre 03/12/2025 3.20  L Final    VC-%Pred-Pre 03/12/2025 83  % Final    IC-Pred 03/12/2025 2.55  L Final    IC-Pre 03/12/2025 2.56  L Final    IC-%Pred-Pre 03/12/2025 100  % Final    ERV-Pred 03/12/2025 1.27  L Final    ERV-Pre 03/12/2025  0.64  L Final    ERV-%Pred-Pre 03/12/2025 50  % Final    FEV1FEV6-Pred 03/12/2025 75  % Final    FEV1FEV6-Pre 03/12/2025 69  % Final    FRCPleth-Pred 03/12/2025 3.93  L Final    FRCPleth-Pre 03/12/2025 3.24  L Final    FRCPleth-%Pred-Pre 03/12/2025 82  % Final    RVPleth-Pred 03/12/2025 3.00  L Final    RVPleth-Pre 03/12/2025 2.60  L Final    RVPleth-%Pred-Pre 03/12/2025 86  % Final    TLCPleth-Pred 03/12/2025 7.23  L Final    TLCPleth-Pre 03/12/2025 5.80  L Final    TLCPleth-%Pred-Pre 03/12/2025 80  % Final    DLCOunc-Pred 03/12/2025 23.93  ml/min/mmHg Final    DLCOunc-Pre 03/12/2025 18.10  ml/min/mmHg Final    DLCOunc-%Pred-Pre 03/12/2025 75  % Final    DLCOcor-Pre 03/12/2025 19.28  ml/min/mmHg Final    DLCOcor-%Pred-Pre 03/12/2025 80  % Final    VA-Pre 03/12/2025 4.43  L Final    VA-%Pred-Pre 03/12/2025 69  % Final    FEV1SVC-Pred 03/12/2025 67  % Final    FEV1SVC-Pre 03/12/2025 70  % Final           Signed Electronically by: Anne Heath MD

## 2025-04-10 NOTE — RESULT ENCOUNTER NOTE
Real Zamorano, It was very nice to meet you today. I had the opportunity to review your labs.    Your thyroid shows elevated TSH, and low free T4.  Are you currently taking levothyroxine dose as prescribed?  Please clarify so that we can adjust your levothyroxine medication accordingly.  These labs suggest that you have low thyroid hormone in your system and you will need an increased dose of levothyroxine.  I am concerned if there is inadequate absorption of levothyroxine.    Chemistry shows normal electrolytes, potassium is normal, kidney function stable and normal liver enzymes.  Your test for diabetes is normal 5.4, congratulations.  Please continue same plan..      It has been a pleasure to participate in your care.  Please call our clinic if you have any questions or concerns.     Sincerely,    Anne Heath MD on 4/9/2025 at 10:28 PM

## 2025-04-11 ENCOUNTER — ANCILLARY PROCEDURE (OUTPATIENT)
Dept: CARDIOLOGY | Facility: CLINIC | Age: 85
End: 2025-04-11
Attending: INTERNAL MEDICINE
Payer: MEDICARE

## 2025-04-11 DIAGNOSIS — I49.5 SICK SINUS SYNDROME (H): ICD-10-CM

## 2025-04-11 DIAGNOSIS — Z95.0 CARDIAC PACEMAKER IN SITU: ICD-10-CM

## 2025-04-11 LAB
MDC_IDC_EPISODE_DTM: NORMAL
MDC_IDC_EPISODE_DURATION: 9 S
MDC_IDC_EPISODE_ID: NORMAL
MDC_IDC_EPISODE_TYPE: NORMAL
MDC_IDC_LEAD_CONNECTION_STATUS: NORMAL
MDC_IDC_LEAD_CONNECTION_STATUS: NORMAL
MDC_IDC_LEAD_IMPLANT_DT: NORMAL
MDC_IDC_LEAD_IMPLANT_DT: NORMAL
MDC_IDC_LEAD_LOCATION: NORMAL
MDC_IDC_LEAD_LOCATION: NORMAL
MDC_IDC_LEAD_LOCATION_DETAIL_1: NORMAL
MDC_IDC_LEAD_LOCATION_DETAIL_1: NORMAL
MDC_IDC_LEAD_MFG: NORMAL
MDC_IDC_LEAD_MFG: NORMAL
MDC_IDC_LEAD_MODEL: NORMAL
MDC_IDC_LEAD_MODEL: NORMAL
MDC_IDC_LEAD_POLARITY_TYPE: NORMAL
MDC_IDC_LEAD_POLARITY_TYPE: NORMAL
MDC_IDC_LEAD_SERIAL: NORMAL
MDC_IDC_LEAD_SERIAL: NORMAL
MDC_IDC_MSMT_BATTERY_DTM: NORMAL
MDC_IDC_MSMT_BATTERY_REMAINING_LONGEVITY: 174 MO
MDC_IDC_MSMT_BATTERY_REMAINING_PERCENTAGE: 100 %
MDC_IDC_MSMT_BATTERY_STATUS: NORMAL
MDC_IDC_MSMT_LEADCHNL_RA_IMPEDANCE_VALUE: 391 OHM
MDC_IDC_MSMT_LEADCHNL_RA_PACING_THRESHOLD_AMPLITUDE: 0.5 V
MDC_IDC_MSMT_LEADCHNL_RA_PACING_THRESHOLD_PULSEWIDTH: 0.4 MS
MDC_IDC_MSMT_LEADCHNL_RV_IMPEDANCE_VALUE: 403 OHM
MDC_IDC_PG_IMPLANT_DTM: NORMAL
MDC_IDC_PG_MFG: NORMAL
MDC_IDC_PG_MODEL: NORMAL
MDC_IDC_PG_SERIAL: NORMAL
MDC_IDC_PG_TYPE: NORMAL
MDC_IDC_SESS_CLINIC_NAME: NORMAL
MDC_IDC_SESS_DTM: NORMAL
MDC_IDC_SESS_TYPE: NORMAL
MDC_IDC_SET_BRADY_AT_MODE_SWITCH_MODE: NORMAL
MDC_IDC_SET_BRADY_AT_MODE_SWITCH_RATE: 150 {BEATS}/MIN
MDC_IDC_SET_BRADY_LOWRATE: 60 {BEATS}/MIN
MDC_IDC_SET_BRADY_MAX_SENSOR_RATE: 130 {BEATS}/MIN
MDC_IDC_SET_BRADY_MAX_TRACKING_RATE: 130 {BEATS}/MIN
MDC_IDC_SET_BRADY_MODE: NORMAL
MDC_IDC_SET_BRADY_PAV_DELAY_HIGH: 220 MS
MDC_IDC_SET_BRADY_PAV_DELAY_LOW: 250 MS
MDC_IDC_SET_BRADY_SAV_DELAY_HIGH: 220 MS
MDC_IDC_SET_BRADY_SAV_DELAY_LOW: 250 MS
MDC_IDC_SET_LEADCHNL_RA_PACING_AMPLITUDE: 2 V
MDC_IDC_SET_LEADCHNL_RA_PACING_CAPTURE_MODE: NORMAL
MDC_IDC_SET_LEADCHNL_RA_PACING_POLARITY: NORMAL
MDC_IDC_SET_LEADCHNL_RA_PACING_PULSEWIDTH: 0.4 MS
MDC_IDC_SET_LEADCHNL_RA_SENSING_ADAPTATION_MODE: NORMAL
MDC_IDC_SET_LEADCHNL_RA_SENSING_POLARITY: NORMAL
MDC_IDC_SET_LEADCHNL_RA_SENSING_SENSITIVITY: 0.5 MV
MDC_IDC_SET_LEADCHNL_RV_PACING_AMPLITUDE: 2.6 V
MDC_IDC_SET_LEADCHNL_RV_PACING_CAPTURE_MODE: NORMAL
MDC_IDC_SET_LEADCHNL_RV_PACING_POLARITY: NORMAL
MDC_IDC_SET_LEADCHNL_RV_PACING_PULSEWIDTH: 1 MS
MDC_IDC_SET_LEADCHNL_RV_SENSING_ADAPTATION_MODE: NORMAL
MDC_IDC_SET_LEADCHNL_RV_SENSING_POLARITY: NORMAL
MDC_IDC_SET_LEADCHNL_RV_SENSING_SENSITIVITY: 1 MV
MDC_IDC_SET_ZONE_DETECTION_INTERVAL: 375 MS
MDC_IDC_SET_ZONE_STATUS: NORMAL
MDC_IDC_SET_ZONE_TYPE: NORMAL
MDC_IDC_SET_ZONE_VENDOR_TYPE: NORMAL
MDC_IDC_STAT_AT_BURDEN_PERCENT: 1 %
MDC_IDC_STAT_AT_DTM_END: NORMAL
MDC_IDC_STAT_AT_DTM_START: NORMAL
MDC_IDC_STAT_BRADY_DTM_END: NORMAL
MDC_IDC_STAT_BRADY_DTM_START: NORMAL
MDC_IDC_STAT_BRADY_RA_PERCENT_PACED: 24 %
MDC_IDC_STAT_BRADY_RV_PERCENT_PACED: 0 %
MDC_IDC_STAT_EPISODE_RECENT_COUNT: 0
MDC_IDC_STAT_EPISODE_RECENT_COUNT: 1
MDC_IDC_STAT_EPISODE_RECENT_COUNT: 3
MDC_IDC_STAT_EPISODE_RECENT_COUNT_DTM_END: NORMAL
MDC_IDC_STAT_EPISODE_RECENT_COUNT_DTM_START: NORMAL
MDC_IDC_STAT_EPISODE_TYPE: NORMAL
MDC_IDC_STAT_EPISODE_VENDOR_TYPE: NORMAL
MDC_IDC_STAT_EPISODE_VENDOR_TYPE: NORMAL

## 2025-04-11 PROCEDURE — 93294 REM INTERROG EVL PM/LDLS PM: CPT | Performed by: INTERNAL MEDICINE

## 2025-04-11 PROCEDURE — 93296 REM INTERROG EVL PM/IDS: CPT | Performed by: INTERNAL MEDICINE

## 2025-04-16 ENCOUNTER — ALLIED HEALTH/NURSE VISIT (OUTPATIENT)
Dept: CARDIOLOGY | Facility: CLINIC | Age: 85
End: 2025-04-16
Payer: MEDICARE

## 2025-04-16 ENCOUNTER — CARE COORDINATION (OUTPATIENT)
Dept: CARDIOLOGY | Facility: CLINIC | Age: 85
End: 2025-04-16

## 2025-04-16 DIAGNOSIS — I10 ESSENTIAL HYPERTENSION: Primary | ICD-10-CM

## 2025-04-16 NOTE — PROGRESS NOTES
Routed to Malissa Dias NP - Mary Odell RN on 2025 at 1:55 PM      Nursing Note by Sheridan Velasquez CMA at 2025  9:30 AM    Author: Sheridan Velasquez CMA Service: -- Author Type: Medical Assistant   Filed: 2025 12:10 PM Encounter Date: 2025 Note Type: Nursing Note   Status: Addendum : Sheridan Velasquez CMA (Medical Assistant)   Related Notes: Original Note by Sheridan Velasquez CMA (Medical Assistant) filed at 2025  9:59 AM   Last office visit: 25 with  Malissa Dias NP     Previous blood pressure: 123/69 Mm Hg      Previous heart rate:  69    bpm     Time of readin:12am     Morning medications were taken at: Patient states he took his morning medications shortly after 630am this morning.      Today's blood pressure:  126/74 mm Hg  Left ar, sitting, manual BP obtained.      Today's heart rate:  68 bpm      Ordering Provider: Malissa Dias NP     Results sent to team # : Team 7     Additional comments:     Patient reports that his BP reading at home are consistantly in the 130s/70s. No symptoms reported.       Adriana MITCHELL(AAMA) 25  12:09 PM

## 2025-04-16 NOTE — NURSING NOTE
Last office visit: 25 with  Malissa Dias NP    Previous blood pressure: 123/69 Mm Hg     Previous heart rate:  69    bpm    Time of readin:12am    Morning medications were taken at: Patient states he took his morning medications shortly after 630am this morning.     Today's blood pressure:  126/74 mm Hg  Left ar, sitting, manual BP obtained.     Today's heart rate:  68 bpm     Ordering Provider: Malissa Dias NP    Results sent to team # : Team 7    Additional comments:    Patient reports that his BP reading at home are consistantly in the 130s/70s. No symptoms reported.      KSluis MITCHELL([facility]) 25  12:09 PM

## 2025-04-17 NOTE — PROGRESS NOTES
Karla Dias, MENDOZA  [facility] Team 77 minutes ago (9:14 AM)       Blood pressure looking good with increased lisinopril and adding amlodipine. No changes recommended     Will send pt a my chart update. Deon PRETTY April 17, 2025, 9:22 AM

## 2025-04-20 DIAGNOSIS — E03.9 HYPOTHYROIDISM, UNSPECIFIED TYPE: ICD-10-CM

## 2025-04-21 ENCOUNTER — MYC MEDICAL ADVICE (OUTPATIENT)
Dept: FAMILY MEDICINE | Facility: CLINIC | Age: 85
End: 2025-04-21
Payer: MEDICARE

## 2025-04-21 RX ORDER — LEVOTHYROXINE SODIUM 175 UG/1
TABLET ORAL
Qty: 102 TABLET | Refills: 1 | Status: SHIPPED | OUTPATIENT
Start: 2025-04-21

## 2025-04-21 NOTE — CONFIDENTIAL NOTE
check Free t3 and consider adding in liothyronine. Needs optimization. Has endo referral     VAMSI Nicholas CNP

## 2025-04-22 ENCOUNTER — TRANSFERRED RECORDS (OUTPATIENT)
Dept: HEALTH INFORMATION MANAGEMENT | Facility: CLINIC | Age: 85
End: 2025-04-22
Payer: MEDICARE

## 2025-04-29 DIAGNOSIS — I10 ESSENTIAL HYPERTENSION: ICD-10-CM

## 2025-04-30 RX ORDER — DILTIAZEM HYDROCHLORIDE 300 MG/1
CAPSULE, COATED, EXTENDED RELEASE ORAL
Qty: 90 CAPSULE | Refills: 0 | Status: SHIPPED | OUTPATIENT
Start: 2025-04-30

## 2025-05-05 ENCOUNTER — LAB (OUTPATIENT)
Dept: LAB | Facility: CLINIC | Age: 85
End: 2025-05-05
Payer: COMMERCIAL

## 2025-05-05 DIAGNOSIS — N18.2 CHRONIC RENAL DISEASE, STAGE II: ICD-10-CM

## 2025-05-05 LAB
ALBUMIN SERPL BCG-MCNC: 4.1 G/DL (ref 3.5–5.2)
ANION GAP SERPL CALCULATED.3IONS-SCNC: 10 MMOL/L (ref 7–15)
BUN SERPL-MCNC: 15.8 MG/DL (ref 8–23)
CALCIUM SERPL-MCNC: 9.8 MG/DL (ref 8.8–10.4)
CHLORIDE SERPL-SCNC: 100 MMOL/L (ref 98–107)
CREAT SERPL-MCNC: 0.93 MG/DL (ref 0.67–1.17)
CREAT UR-MCNC: 32.7 MG/DL
EGFRCR SERPLBLD CKD-EPI 2021: 80 ML/MIN/1.73M2
GLUCOSE SERPL-MCNC: 108 MG/DL (ref 70–99)
HCO3 SERPL-SCNC: 30 MMOL/L (ref 22–29)
HGB BLD-MCNC: 12.3 G/DL (ref 13.3–17.7)
MICROALBUMIN UR-MCNC: <12 MG/L
MICROALBUMIN/CREAT UR: NORMAL MG/G{CREAT}
PHOSPHATE SERPL-MCNC: 3.3 MG/DL (ref 2.5–4.5)
POTASSIUM SERPL-SCNC: 3.9 MMOL/L (ref 3.4–5.3)
SODIUM SERPL-SCNC: 140 MMOL/L (ref 135–145)

## 2025-05-05 PROCEDURE — 82043 UR ALBUMIN QUANTITATIVE: CPT

## 2025-05-05 PROCEDURE — 85018 HEMOGLOBIN: CPT

## 2025-05-05 PROCEDURE — 80069 RENAL FUNCTION PANEL: CPT

## 2025-05-05 PROCEDURE — 82570 ASSAY OF URINE CREATININE: CPT

## 2025-05-05 PROCEDURE — 36415 COLL VENOUS BLD VENIPUNCTURE: CPT

## 2025-05-12 ENCOUNTER — VIRTUAL VISIT (OUTPATIENT)
Dept: NEPHROLOGY | Facility: CLINIC | Age: 85
End: 2025-05-12
Payer: MEDICARE

## 2025-05-12 DIAGNOSIS — N18.2 ANEMIA IN STAGE 2 CHRONIC KIDNEY DISEASE: ICD-10-CM

## 2025-05-12 DIAGNOSIS — N18.2 CHRONIC RENAL DISEASE, STAGE II: Primary | ICD-10-CM

## 2025-05-12 DIAGNOSIS — D63.1 ANEMIA IN STAGE 2 CHRONIC KIDNEY DISEASE: ICD-10-CM

## 2025-05-12 PROCEDURE — 98005 SYNCH AUDIO-VIDEO EST LOW 20: CPT

## 2025-05-12 PROCEDURE — 1126F AMNT PAIN NOTED NONE PRSNT: CPT

## 2025-05-12 NOTE — PROGRESS NOTES
Nephrology Video Visit 5/12/25    Assessment and Plan:    1. 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 Creat 0.9, eGFR 80, Urine albumin is neg   - Baseline creat ~ 1.0 w/DONA   - Etiology of his CKD likely HTN/DONA   - Renal US normal   - Does not use NSAIDs   - Blood pressures controlled   - DM well controlled with A1c of 5.4 % ( 4/25)   - He is on statin for CV risk reduction    2. CV/HTN/Pafib, Diastolic HF/PPM - Currently well controlled w/decreased edema per patient report. On Maintenance lymphedema therapy to assist with edema control. No dyspnea. Home b/ps 120-130/. Clinic b/p 123/69 on 4/9/25. Weight 250 # from 266 # from 248 #, 262#. Was 292# in 6/21. Has not brought in his b/p device to be checked for accuracy. Albumin 4.1.   Echo 2/25 showing EF 60%, IVC dilated  Current regimen:    Lasix 20 mg bid   Lisinopril 20 mg bid   Diltiazem  mg every day   Amlodipine 5 mg qd   Metolazone 2.5 mg prn    - Per Cards    3. Electrolytes - No acute concerns. K 3.9 Na 140    - Taking KCL 20 meq AM and 10 meq evening    4. Acid base - Bicarb 30.     - Suspect he is still on the dry side    5. BMD - Ca 9.8 Phos 3.3 albumin 4.1   Vit D 38, PTH 70 ( 11/24)   No need for Ca/Vit D.     6. BPH - Controlled with Flomax. Has appt with Urology this week for evaluation of his UTI/possible prostatitis    7. Anemia - Hgb 12.3   - Iron studies 12/23: Ferritin  Fe 98 IS 32. Recheck next visit   - On iron tab every other day.    - Does not meet LEIGH criteria     8. Disposition - RTC 6 months for follow up w/labs prior    Assessment and plan was discussed with patient and he voiced his understanding and agreement.    Reason for Visit:  CKD2/HTN    HPI:  Mr Craven is an 85 yo male with CKD2, COPD, RIN on BiPAP, Diastolic HF, SSS s/p PPM, CAD, P-Afib, HTN, BPH, h/o Pulmonary embolism, present today for CKD follow up.   Last seen in clinic by me on 11/18/24  Baseline creat has been < 1.1 since 7/22 w/ DONA    Interval Hx;    No admissions     ROS:    Feeling well w/o complaint  B/ps 120-130/  Wearing compression wraps/lymphedema treatment  Walking Clayton twice daily  Remains on CPAP  No CP/dyspnea  No abdominal concerns  Voiding w/o complication. On Flomax. Seeing Urology this week for possible prostatitis  Appetite is too good. Has been a vegetarian for years   Had a left leg cellulitis 2/25 which has resolved    Chronic Health Problems:    CKD2  COPD  RIN on CPAP  Diastolic HF  SSS s/p PPM  CAD  P-Afib  h/o Pulmonary embolism  HTN  Hypothyroidism  Anxiety  BPH  HLD    Family Hx:   Family History   Problem Relation Age of Onset    Brain Cancer Mother     Other Cancer Mother     Anxiety Disorder Mother     Cerebrovascular Disease Father     Hypertension Father     Obesity Father     Substance Abuse Son     Osteoporosis Sister     Thyroid Disease Sister     Anesthesia Reaction No family hx of     Thrombosis No family hx of      Personal Hx:   , lives with his dog Clayton. Lives in [facility] apt connected to the [facility] ( [facility]) . Has services and meals. He continues to set up his own medications. NS    Allergies:  No Known Allergies      Medications:  Current Outpatient Medications   Medication Sig Dispense Refill    acetaminophen (TYLENOL) 500 MG tablet Take 1,000 mg by mouth every 6 hours as needed for mild pain.      Acetylcarnitine HCl (ACETYL L-CARNITINE HCL PO) Take 1 capsule by mouth daily. Dose unknown      albuterol (PROAIR HFA/PROVENTIL HFA/VENTOLIN HFA) 108 (90 Base) MCG/ACT inhaler INHALE 2 PUFFS INTO THE LUNGS EVERY 6 HOURS AS NEEDED FOR SHORTNESS OF BREATH OR WHEEZING OR COUGH 18 g 2    amLODIPine (NORVASC) 5 MG tablet Take 1 tablet (5 mg) by mouth daily. 90 tablet 3    aspirin (ASA) 81 MG chewable tablet Take 1 tablet by mouth daily      atorvastatin (LIPITOR) 80 MG tablet Take 1 tablet (80 mg) by mouth every evening. 90 tablet 3    diltiazem ER COATED BEADS (CARDIZEM CD/CARTIA XT) 300 MG 24 hr capsule TAKE 1  CAPSULE(300 MG) BY MOUTH DAILY 90 capsule 0    ferrous sulfate 45 MG TBCR CR tablet TAKE 1 TABLET BY MOUTH EVERY OTHER DAY. 45 tablet 0    Fluticasone-Umeclidin-Vilant (TRELEGY ELLIPTA) 100-62.5-25 MCG/ACT oral inhaler Inhale 1 puff into the lungs daily. 180 each 3    furosemide (LASIX) 40 MG tablet Take 0.5 tablets (20 mg) by mouth 2 times daily. 90 tablet 2    levothyroxine (SYNTHROID/LEVOTHROID) 175 MCG tablet TAKE 1 TABLET BY MOUTH DAILY 6 DAYS A WEEK, AND 2 TABLETS ON FRIDAYS 102 tablet 1    lisinopril (ZESTRIL) 20 MG tablet Take 1 tablet (20 mg) by mouth 2 times daily. 180 tablet 3    metolazone (ZAROXOLYN) 2.5 MG tablet Take 1 tablet (2.5 mg) by mouth every other day. Taken 30 minutes prior to furosemide (Patient taking differently: Take 2.5 mg by mouth daily as needed. Taken 30 minutes prior to furosemide) 45 tablet 0    Multiple vitamin TABS Take 1 tablet by mouth daily      polyethylene glycol (MIRALAX) 17 g packet Take 17 g by mouth daily 7 packet 0    potassium chloride cammy ER (KLOR-CON M10) 10 MEQ CR tablet Take 2 tablets (20 mEq) by mouth every morning AND 1 tablet (10 mEq) every evening. 270 tablet 3    rivaroxaban ANTICOAGULANT (XARELTO ANTICOAGULANT) 20 MG TABS tablet Take 1 tablet (20 mg) by mouth daily (with dinner). 90 tablet 1    tamsulosin (FLOMAX) 0.4 MG capsule TAKE 1 CAPSULE(0.4 MG) BY MOUTH DAILY 90 capsule 0    TAURINE PO Take 1 capsule by mouth daily. Dose unknown      venlafaxine (EFFEXOR) 75 MG tablet TAKE 1 TABLET(75 MG) BY MOUTH TWICE DAILY 180 tablet 1     No current facility-administered medications for this visit.      Vitals:  Weight 250 #    Exam:  GEN: Pleasant male in NAD  LUNGS: Breathing is non labored.    NEURO: A/O  PSYCH: Bright affect, engaging     LABS:   CMP  Recent Labs   Lab Test 05/05/25  0830 04/09/25  1615 02/07/25  0949 01/08/25  0953 08/31/21  1401 06/21/21  1454 06/14/21  1455 04/21/21  0931 03/02/21  1405    139 140 143   < > 138 137 138 136   POTASSIUM  3.9 4.2 4.2 4.4   < > 4.1 4.7 3.7 4.5   CHLORIDE 100 100 101 105   < > 103 105 99 104   CO2 30* 27 31* 28   < > 29 28 39* 28   ANIONGAP 10 12 8 10   < > 6 4 <1* 4   * 95 96 92   < > 100* 108* 104* 93   BUN 15.8 20.4 14.2 14.5   < > 64* 62* 44* 57*   CR 0.93 1.09 0.88 0.86   < > 1.57* 1.81* 1.02 1.42*   GFRESTIMATED 80 67 84 85   < > 40* 34* 68 46*   GFRESTBLACK  --   --   --   --   --  47* 39* 79 53*   LAWSON 9.8 9.7 9.5 9.6   < > 9.4 9.2 9.2 9.6    < > = values in this interval not displayed.     Recent Labs   Lab Test 04/09/25  1615 02/07/25  0949 01/08/25  0953 10/30/24  1549 10/18/24  1248   BILITOTAL 0.5  --  0.6 0.4 0.3   ALKPHOS 137  --  129 119 125   ALT 20 14 13 28 26   AST 23  --  18 30 31     CBC  Recent Labs   Lab Test 05/05/25  0830 02/28/25  1044 01/08/25  0953 11/11/24  0837 10/30/24  1549 10/19/24  0647   HGB 12.3* 12.6* 12.5* 12.2* 12.7* 12.2*   WBC  --  9.2 7.9  --  9.7 8.5   RBC  --  4.03* 3.90*  --  3.99* 3.76*   HCT  --  40.2 40.2  --  39.3* 37.0*   MCV  --  100 103*  --  99 98   MCH  --  31.3 32.1  --  31.8 32.4   MCHC  --  31.3* 31.1*  --  32.3 33.0   RDW  --  13.7 13.8  --  15.2* 14.3   PLT  --  163 164  --  196 158     URINE STUDIES  Recent Labs   Lab Test 02/25/25  1058 11/26/24  1051 09/17/24  1040 09/01/24  1402 07/31/24  1332 07/29/24  1835   COLOR Yellow Yellow Yellow Yellow Yellow Light Yellow   APPEARANCE Clear Clear Clear Clear Clear Slightly Cloudy*   URINEGLC Negative Negative Negative Negative Negative Negative   URINEBILI Negative Negative Negative Negative Negative Negative   URINEKETONE Negative Negative Negative Negative Negative Negative   SG 1.015 1.015 1.015 1.020 1.015 1.014   UBLD Negative Negative Negative Small* Negative Negative   URINEPH 7.0 7.0 7.0 7.0 8.0* 7.5*   PROTEIN Negative Negative Negative 30* Negative 10*   UROBILINOGEN 0.2 0.2 0.2 0.2 0.2  --    NITRITE Negative Negative Negative Negative Negative Negative   LEUKEST Negative Negative Negative Moderate*  Small* Large*   RBCU  --   --  0-2 25-50* 2-5* 2   WBCU  --   --  0-5 >100* 25-50* >182*     Recent Labs   Lab Test 05/23/22  1356 02/04/22  0902 10/28/21  1118 06/14/21  1516   UTPG 0.18 0.29* 0.44* 0.38*     PTH  Recent Labs   Lab Test 11/11/24  0837 12/15/23  0822 05/23/22  1356   PTHI 70* 64 92*     IRON STUDIES  Recent Labs   Lab Test 10/30/24  1549 04/10/24  1617 01/12/24  1155 12/15/23  0822 10/04/23  1358 05/03/23  0809   IRON  --   --   --  98 68 30*   FEB  --   --   --  311 326 368   IRONSAT  --   --   --  32 21 8*   SEGUNDO 74 58 80  --  44 43       Tiffany Suero, NP

## 2025-05-12 NOTE — NURSING NOTE
Current patient location: [address] [place] [address] [address]  [facility] [address]    Is the patient currently in the state of [place]? YES    Visit mode: VIDEO    If the visit is dropped, the patient can be reconnected by:VIDEO VISIT: Text to cell phone:   Telephone Information:   Mobile [phone]    and VIDEO VISIT: Send to e-mail at: [email or web address]    Will anyone else be joining the visit? NO  (If patient encounters technical issues they should call [phone])    Are changes needed to the allergy or medication list? No    Are refills needed on medications prescribed by this physician? NO    Rooming Documentation:  Questionnaire(s) completed    Reason for visit: RECHECK    Zully MATAF

## 2025-05-12 NOTE — PROGRESS NOTES
Virtual Visit Details    Type of service:  Video Visit   Video Start Time: 0833  Video End Time:8:52 AM    Originating Location (pt. Location): Home    Distant Location (provider location):  On-site  Platform used for Video Visit: DashBurst

## 2025-05-12 NOTE — LETTER
5/12/2025       RE: Erich Craven  [address]  [facility] [address]     Dear Colleague,    Thank you for referring your patient, Erich Craven, to the [facility] [place] [facility]. Please see a copy of my visit note below.    Virtual Visit Details    Type of service:  Video Visit   Video Start Time: 0833  Video End Time:8:52 AM    Originating Location (pt. Location): Home    Distant Location (provider location):  On-site  Platform used for Video Visit: Well      Nephrology Video Visit 5/12/25    Assessment and Plan:    1. 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 Creat 0.9, eGFR 80, Urine albumin is neg   - Baseline creat ~ 1.0 w/DONA   - Etiology of his CKD likely HTN/DONA   - Renal US normal   - Does not use NSAIDs   - Blood pressures controlled   - DM well controlled with A1c of 5.4 % ( 4/25)   - He is on statin for CV risk reduction    2. CV/HTN/Pafib, Diastolic HF/PPM - Currently well controlled w/decreased edema per patient report. On Maintenance lymphedema therapy to assist with edema control. No dyspnea. Home b/ps 120-130/. Clinic b/p 123/69 on 4/9/25. Weight 250 # from 266 # from 248 #, 262#. Was 292# in 6/21. Has not brought in his b/p device to be checked for accuracy. Albumin 4.1.   Echo 2/25 showing EF 60%, IVC dilated  Current regimen:    Lasix 20 mg bid   Lisinopril 20 mg bid   Diltiazem  mg every day   Amlodipine 5 mg qd   Metolazone 2.5 mg prn    - Per Cards    3. Electrolytes - No acute concerns. K 3.9 Na 140    - Taking KCL 20 meq AM and 10 meq evening    4. Acid base - Bicarb 30.     - Suspect he is still on the dry side    5. BMD - Ca 9.8 Phos 3.3 albumin 4.1   Vit D 38, PTH 70 ( 11/24)   No need for Ca/Vit D.     6. BPH - Controlled with Flomax. Has appt with Urology this week for evaluation of his UTI/possible prostatitis    7. Anemia - Hgb 12.3   - Iron studies 12/23: Ferritin  Fe 98 IS 32. Recheck next  visit   - On iron tab every other day.    - Does not meet LEIGH criteria     8. Disposition - RTC 6 months for follow up w/labs prior    Assessment and plan was discussed with patient and he voiced his understanding and agreement.    Reason for Visit:  CKD2/HTN    HPI:  Mr Craven is an 85 yo male with CKD2, COPD, RIN on BiPAP, Diastolic HF, SSS s/p PPM, CAD, P-Afib, HTN, BPH, h/o Pulmonary embolism, present today for CKD follow up.   Last seen in clinic by me on 11/18/24  Baseline creat has been < 1.1 since 7/22 w/ DONA    Interval Hx;    No admissions     ROS:   Feeling well w/o complaint  B/ps 120-130/  Wearing compression wraps/lymphedema treatment  Walking [place] twice daily  Remains on CPAP  No CP/dyspnea  No abdominal concerns  Voiding w/o complication. On Flomax. Seeing Urology this week for possible prostatitis  Appetite is too good. Has been a vegetarian for years   Had a left leg cellulitis 2/25 which has resolved    Chronic Health Problems:    CKD2  COPD  RIN on CPAP  Diastolic HF  SSS s/p PPM  CAD  P-Afib  h/o Pulmonary embolism  HTN  Hypothyroidism  Anxiety  BPH  HLD    Family Hx:   Family History   Problem Relation Age of Onset     Brain Cancer Mother      Other Cancer Mother      Anxiety Disorder Mother      Cerebrovascular Disease Father      Hypertension Father      Obesity Father      Substance Abuse Son      Osteoporosis Sister      Thyroid Disease Sister      Anesthesia Reaction No family hx of      Thrombosis No family hx of      Personal Hx:   , lives with his dog Clayton. Lives in [facility] apt connected to the [facility] ( [facility]) . Has services and meals. He continues to set up his own medications. NS    Allergies:  No Known Allergies      Medications:  Current Outpatient Medications   Medication Sig Dispense Refill     acetaminophen (TYLENOL) 500 MG tablet Take 1,000 mg by mouth every 6 hours as needed for mild pain.       Acetylcarnitine HCl (ACETYL L-CARNITINE HCL PO)  Take 1 capsule by mouth daily. Dose unknown       albuterol (PROAIR HFA/PROVENTIL HFA/VENTOLIN HFA) 108 (90 Base) MCG/ACT inhaler INHALE 2 PUFFS INTO THE LUNGS EVERY 6 HOURS AS NEEDED FOR SHORTNESS OF BREATH OR WHEEZING OR COUGH 18 g 2     amLODIPine (NORVASC) 5 MG tablet Take 1 tablet (5 mg) by mouth daily. 90 tablet 3     aspirin (ASA) 81 MG chewable tablet Take 1 tablet by mouth daily       atorvastatin (LIPITOR) 80 MG tablet Take 1 tablet (80 mg) by mouth every evening. 90 tablet 3     diltiazem ER COATED BEADS (CARDIZEM CD/CARTIA XT) 300 MG 24 hr capsule TAKE 1 CAPSULE(300 MG) BY MOUTH DAILY 90 capsule 0     ferrous sulfate 45 MG TBCR CR tablet TAKE 1 TABLET BY MOUTH EVERY OTHER DAY. 45 tablet 0     Fluticasone-Umeclidin-Vilant (TRELEGY ELLIPTA) 100-62.5-25 MCG/ACT oral inhaler Inhale 1 puff into the lungs daily. 180 each 3     furosemide (LASIX) 40 MG tablet Take 0.5 tablets (20 mg) by mouth 2 times daily. 90 tablet 2     levothyroxine (SYNTHROID/LEVOTHROID) 175 MCG tablet TAKE 1 TABLET BY MOUTH DAILY 6 DAYS A WEEK, AND 2 TABLETS ON FRIDAYS 102 tablet 1     lisinopril (ZESTRIL) 20 MG tablet Take 1 tablet (20 mg) by mouth 2 times daily. 180 tablet 3     metolazone (ZAROXOLYN) 2.5 MG tablet Take 1 tablet (2.5 mg) by mouth every other day. Taken 30 minutes prior to furosemide (Patient taking differently: Take 2.5 mg by mouth daily as needed. Taken 30 minutes prior to furosemide) 45 tablet 0     Multiple vitamin TABS Take 1 tablet by mouth daily       polyethylene glycol (MIRALAX) 17 g packet Take 17 g by mouth daily 7 packet 0     potassium chloride cammy ER (KLOR-CON M10) 10 MEQ CR tablet Take 2 tablets (20 mEq) by mouth every morning AND 1 tablet (10 mEq) every evening. 270 tablet 3     rivaroxaban ANTICOAGULANT (XARELTO ANTICOAGULANT) 20 MG TABS tablet Take 1 tablet (20 mg) by mouth daily (with dinner). 90 tablet 1     tamsulosin (FLOMAX) 0.4 MG capsule TAKE 1 CAPSULE(0.4 MG) BY MOUTH DAILY 90 capsule 0      TAURINE PO Take 1 capsule by mouth daily. Dose unknown       venlafaxine (EFFEXOR) 75 MG tablet TAKE 1 TABLET(75 MG) BY MOUTH TWICE DAILY 180 tablet 1     No current facility-administered medications for this visit.      Vitals:  Weight 250 #    Exam:  GEN: Pleasant male in NAD  LUNGS: Breathing is non labored.    NEURO: A/O  PSYCH: Bright affect, engaging     LABS:   CMP  Recent Labs   Lab Test 05/05/25  0830 04/09/25  1615 02/07/25  0949 01/08/25  0953 08/31/21  1401 06/21/21  1454 06/14/21  1455 04/21/21  0931 03/02/21  1405    139 140 143   < > 138 137 138 136   POTASSIUM 3.9 4.2 4.2 4.4   < > 4.1 4.7 3.7 4.5   CHLORIDE 100 100 101 105   < > 103 105 99 104   CO2 30* 27 31* 28   < > 29 28 39* 28   ANIONGAP 10 12 8 10   < > 6 4 <1* 4   * 95 96 92   < > 100* 108* 104* 93   BUN 15.8 20.4 14.2 14.5   < > 64* 62* 44* 57*   CR 0.93 1.09 0.88 0.86   < > 1.57* 1.81* 1.02 1.42*   GFRESTIMATED 80 67 84 85   < > 40* 34* 68 46*   GFRESTBLACK  --   --   --   --   --  47* 39* 79 53*   LAWSON 9.8 9.7 9.5 9.6   < > 9.4 9.2 9.2 9.6    < > = values in this interval not displayed.     Recent Labs   Lab Test 04/09/25  1615 02/07/25  0949 01/08/25  0953 10/30/24  1549 10/18/24  1248   BILITOTAL 0.5  --  0.6 0.4 0.3   ALKPHOS 137  --  129 119 125   ALT 20 14 13 28 26   AST 23  --  18 30 31     CBC  Recent Labs   Lab Test 05/05/25  0830 02/28/25  1044 01/08/25  0953 11/11/24  0837 10/30/24  1549 10/19/24  0647   HGB 12.3* 12.6* 12.5* 12.2* 12.7* 12.2*   WBC  --  9.2 7.9  --  9.7 8.5   RBC  --  4.03* 3.90*  --  3.99* 3.76*   HCT  --  40.2 40.2  --  39.3* 37.0*   MCV  --  100 103*  --  99 98   MCH  --  31.3 32.1  --  31.8 32.4   MCHC  --  31.3* 31.1*  --  32.3 33.0   RDW  --  13.7 13.8  --  15.2* 14.3   PLT  --  163 164  --  196 158     URINE STUDIES  Recent Labs   Lab Test 02/25/25  1058 11/26/24  1051 09/17/24  1040 09/01/24  1402 07/31/24  1332 07/29/24  1835   COLOR Yellow Yellow Yellow Yellow Yellow Light Yellow    APPEARANCE Clear Clear Clear Clear Clear Slightly Cloudy*   URINEGLC Negative Negative Negative Negative Negative Negative   URINEBILI Negative Negative Negative Negative Negative Negative   URINEKETONE Negative Negative Negative Negative Negative Negative   SG 1.015 1.015 1.015 1.020 1.015 1.014   UBLD Negative Negative Negative Small* Negative Negative   URINEPH 7.0 7.0 7.0 7.0 8.0* 7.5*   PROTEIN Negative Negative Negative 30* Negative 10*   UROBILINOGEN 0.2 0.2 0.2 0.2 0.2  --    NITRITE Negative Negative Negative Negative Negative Negative   LEUKEST Negative Negative Negative Moderate* Small* Large*   RBCU  --   --  0-2 25-50* 2-5* 2   WBCU  --   --  0-5 >100* 25-50* >182*     Recent Labs   Lab Test 05/23/22  1356 02/04/22  0902 10/28/21  1118 06/14/21  1516   UTPG 0.18 0.29* 0.44* 0.38*     PTH  Recent Labs   Lab Test 11/11/24  0837 12/15/23  0822 05/23/22  1356   PTHI 70* 64 92*     IRON STUDIES  Recent Labs   Lab Test 10/30/24  1549 04/10/24  1617 01/12/24  1155 12/15/23  0822 10/04/23  1358 05/03/23  0809   IRON  --   --   --  98 68 30*   FEB  --   --   --  311 326 368   IRONSAT  --   --   --  32 21 8*   SEGUNDO 74 58 80  --  44 43       Tiffany Suero NP      Again, thank you for allowing me to participate in the care of your patient.      Sincerely,    Tiffany Suero NP

## 2025-05-21 ENCOUNTER — MYC REFILL (OUTPATIENT)
Dept: FAMILY MEDICINE | Facility: CLINIC | Age: 85
End: 2025-05-21
Payer: MEDICARE

## 2025-05-21 DIAGNOSIS — E03.9 HYPOTHYROIDISM, UNSPECIFIED TYPE: ICD-10-CM

## 2025-05-22 RX ORDER — TAMSULOSIN HYDROCHLORIDE 0.4 MG/1
0.4 CAPSULE ORAL DAILY
Qty: 90 CAPSULE | Refills: 0 | Status: SHIPPED | OUTPATIENT
Start: 2025-05-22

## 2025-06-14 ENCOUNTER — OFFICE VISIT (OUTPATIENT)
Dept: URGENT CARE | Facility: URGENT CARE | Age: 85
End: 2025-06-14
Payer: COMMERCIAL

## 2025-06-14 VITALS
OXYGEN SATURATION: 96 % | BODY MASS INDEX: 35.21 KG/M2 | WEIGHT: 260 LBS | HEART RATE: 64 BPM | SYSTOLIC BLOOD PRESSURE: 117 MMHG | TEMPERATURE: 97 F | RESPIRATION RATE: 16 BRPM | HEIGHT: 72 IN | DIASTOLIC BLOOD PRESSURE: 73 MMHG

## 2025-06-14 DIAGNOSIS — L03.116 CELLULITIS OF LEFT LOWER EXTREMITY: Primary | ICD-10-CM

## 2025-06-14 DIAGNOSIS — I89.0 LYMPHEDEMA: ICD-10-CM

## 2025-06-14 PROCEDURE — 3074F SYST BP LT 130 MM HG: CPT | Performed by: STUDENT IN AN ORGANIZED HEALTH CARE EDUCATION/TRAINING PROGRAM

## 2025-06-14 PROCEDURE — 99213 OFFICE O/P EST LOW 20 MIN: CPT | Performed by: STUDENT IN AN ORGANIZED HEALTH CARE EDUCATION/TRAINING PROGRAM

## 2025-06-14 PROCEDURE — 3078F DIAST BP <80 MM HG: CPT | Performed by: STUDENT IN AN ORGANIZED HEALTH CARE EDUCATION/TRAINING PROGRAM

## 2025-06-14 RX ORDER — CEPHALEXIN 500 MG/1
500 CAPSULE ORAL 3 TIMES DAILY
Qty: 21 CAPSULE | Refills: 0 | Status: SHIPPED | OUTPATIENT
Start: 2025-06-14 | End: 2025-06-21

## 2025-06-14 NOTE — PROGRESS NOTES
Urgent Care Clinic Visit    Chief Complaint   Patient presents with    Lymphedema     Pt is experiencing swollen, pressure and pain possible skin break, leaking  in both legs. Pt is worried of having infection. Pt has upcoming appt with speciality on 6/16.                  6/14/2025     9:18 AM   Additional Questions   Roomed by abdias   Accompanied by self

## 2025-06-14 NOTE — PROGRESS NOTES
chief complaint: Concern for infection    HPI:  Erich Craven is a 85 year old male who presents today complaining of worsening swelling, pain and pressure on his bilateral lower extremity.  About a week ago he noticed warmth and redness on his left lower leg, had some bruising and skin breakage in that area as well.  Patient has a known history of lymphedema, has had previous episodes of cellulitis.  Reported chills yesterday and this morning but otherwise has not noted any fever.  Denies chest pain, shortness of breath or any other associated symptoms    Patient follows lymphedema clinic and has upcoming follow-up on 6/16    History obtained from the patient.    Problem List:  2025-02: Primary osteoarthritis of left knee  2024-10: MARTÍNEZ (dyspnea on exertion)  2024-10: Elevated troponin  2024-10: DONA (acute kidney injury)  2024-10: Orthostatic dizziness  2024-07: Acute on chronic heart failure with preserved ejection   fraction (H)  2024-07: Chronic kidney disease, stage 3b (H)  2024-02: Hypoxia  2024-02: Pneumonia of left lung due to infectious organism,   unspecified part of lung  2023-06: Alcohol dependence in remission (H)  2023-03: Hypokalemia  2023-02: Presence of left artificial shoulder joint  2023-02: Slow transit constipation  2022-09: Personal history of other venous thrombosis and embolism  2022-09: Personal history of pulmonary embolism  2022-09: Prediabetes  2022-09: Chronic diastolic (congestive) heart failure (H)  2022-09: Ankle injury, right, initial encounter  2022-08: Chronic left shoulder pain  2022-02: Long term current use of anticoagulants with INR goal of 2.0-  3.0  2021-09: Chronic pain of both shoulders  2021-08: Chronic right shoulder pain  2021-03: Primary osteoarthritis of right shoulder  2020-12: Chronic kidney disease, stage 3a (H)  2020-08: Type 2 diabetes mellitus without complication, without long-  term current use of insulin (H)  2020-05: Deep vein thrombosis (DVT) of distal vein  of lower extremity,   unspecified chronicity, unspecified laterality (H)  2020-03: Anxiety  2019-11: Status post coronary angiogram  2019-10: Heart failure with preserved ejection fraction, unspecified   HF chronicity (H)  2019-07: Left knee pain  2019-05: Muscle tension headache  2019-04: Shoulder pain, right  2019-04: Impingement syndrome, shoulder, right  2019-04: Morbid obesity (H)  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  2015-07: Insomnia  2015-03: Sick sinus syndrome (H)  2014-12: History of coronary artery stent placement  2014-07: Hypoxemia  2012-09: Benign prostatic hyperplasia without lower urinary tract   symptoms  2010-09: Benign neoplasm of cerebral meninges (H)  2009-04: Sleep apnea  PE (pulmonary thromboembolism) (H)  HTN (hypertension)  Chronic diastolic heart failure (H)  Dyspnea  Fatigue  RIN (obstructive sleep apnea)  COPD (chronic obstructive pulmonary disease) (H)  Hypothyroidism  Paroxysmal A-fib (H)  HLD (hyperlipidemia)  Lymphedema  (HFpEF) heart failure with preserved ejection fraction (H)  Essential hypertension  Cardiac pacemaker in situ  DVT (deep venous thrombosis) (H)  Coronary artery disease involving native coronary artery with angina   pectoris, unspecified whether native or transplanted heart  Obesity, Class III, BMI 40-49.9 (morbid obesity) (H)      Past Medical History:   Diagnosis Date    (HFpEF) heart failure with preserved ejection fraction (H)     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 Started  in 2012    Hypothyroidism     Lymphedema     Mumps     Obesity, Class III, BMI 40-49.9 (morbid obesity) (H)     RIN (obstructive sleep apnea)     has refused CPAP    Pacemaker     Paroxysmal A-fib (H)     PE (pulmonary thromboembolism) (H)     Status post coronary angiogram 2019       Social History     Tobacco Use    Smoking status: Former     Current packs/day: 0.00     Average packs/day: 0.5 packs/day for 30.0 years (15.0 ttl pk-yrs)     Types: Cigarettes     Start date: 6/15/1959     Quit date: 1989     Years since quittin.0     Passive exposure: Past    Smokeless tobacco: Never    Tobacco comments:     smoked from  to    Substance Use Topics    Alcohol use: No     Comment: quit in ; recovering       Review of systems  ROS negative except for pertinent positives listed in HPI      Vitals:    25 0917   BP: 117/73   BP Location: Right arm   Patient Position: Chair   Cuff Size: Adult Large   Pulse: 64   Resp: 16   Temp: 97  F (36.1  C)   TempSrc: Esophageal   SpO2: 96%   Weight: 117.9 kg (260 lb)   Height: 1.829 m (6')       Physical Exam  Constitutional: healthy, alert, and no distress  Respiratory:unlabored respiratory effort  Skin area of bright erythrma on the left lower ext, warm to touch and painful with palpation        Musculoskeletal: extremities normal- no gross deformities noted, gait normal  Psychiatric: mentation appears normal and affect normal/bright    Assessment & Plan     Jaun was seen today for lymphedema.    Diagnoses and all orders for this visit:    Cellulitis of left lower extremity  Worsening redness, warmth and tenderness of the left lower ext concerning for cellulitis.   -     cephALEXin (KEFLEX) 500 MG capsule; Take 1 capsule (500 mg) by mouth 3 times daily for 7 days.    Lymphedema  Worsening swelling, has upcoming follow up in 2 days    At the end of the encounter, I discussed diagnosis, medications. Discussed red flags for immediate return to  clinic/ER, as well as indications for follow up if no improvement. Patient understood and agreed to plan. Patient was stable for discharge.

## 2025-06-16 ENCOUNTER — THERAPY VISIT (OUTPATIENT)
Dept: OCCUPATIONAL THERAPY | Facility: CLINIC | Age: 85
End: 2025-06-16
Payer: COMMERCIAL

## 2025-06-16 DIAGNOSIS — I89.0 LYMPHEDEMA: Primary | ICD-10-CM

## 2025-06-16 PROCEDURE — 97140 MANUAL THERAPY 1/> REGIONS: CPT | Mod: GO | Performed by: OCCUPATIONAL THERAPIST

## 2025-06-26 ASSESSMENT — SLEEP AND FATIGUE QUESTIONNAIRES
HOW LIKELY ARE YOU TO NOD OFF OR FALL ASLEEP WHILE LYING DOWN TO REST IN THE AFTERNOON WHEN CIRCUMSTANCES PERMIT: HIGH CHANCE OF DOZING
HOW LIKELY ARE YOU TO NOD OFF OR FALL ASLEEP WHEN YOU ARE A PASSENGER IN A CAR FOR AN HOUR WITHOUT A BREAK: SLIGHT CHANCE OF DOZING
HOW LIKELY ARE YOU TO NOD OFF OR FALL ASLEEP WHILE SITTING INACTIVE IN A PUBLIC PLACE: WOULD NEVER DOZE
HOW LIKELY ARE YOU TO NOD OFF OR FALL ASLEEP IN A CAR, WHILE STOPPED FOR A FEW MINUTES IN TRAFFIC: WOULD NEVER DOZE
HOW LIKELY ARE YOU TO NOD OFF OR FALL ASLEEP WHILE SITTING AND READING: SLIGHT CHANCE OF DOZING
HOW LIKELY ARE YOU TO NOD OFF OR FALL ASLEEP WHILE SITTING AND TALKING TO SOMEONE: WOULD NEVER DOZE
HOW LIKELY ARE YOU TO NOD OFF OR FALL ASLEEP WHILE WATCHING TV: SLIGHT CHANCE OF DOZING
HOW LIKELY ARE YOU TO NOD OFF OR FALL ASLEEP WHILE SITTING QUIETLY AFTER LUNCH WITHOUT ALCOHOL: SLIGHT CHANCE OF DOZING

## 2025-06-28 DIAGNOSIS — E87.6 HYPOKALEMIA: ICD-10-CM

## 2025-06-30 RX ORDER — POTASSIUM CHLORIDE 750 MG/1
TABLET, EXTENDED RELEASE ORAL
Qty: 270 TABLET | Refills: 3 | OUTPATIENT
Start: 2025-06-30

## 2025-07-01 ENCOUNTER — OFFICE VISIT (OUTPATIENT)
Dept: SLEEP MEDICINE | Facility: CLINIC | Age: 85
End: 2025-07-01
Attending: PHYSICIAN ASSISTANT
Payer: MEDICARE

## 2025-07-01 VITALS
OXYGEN SATURATION: 94 % | WEIGHT: 267.2 LBS | HEIGHT: 72 IN | SYSTOLIC BLOOD PRESSURE: 149 MMHG | DIASTOLIC BLOOD PRESSURE: 73 MMHG | HEART RATE: 56 BPM | BODY MASS INDEX: 36.19 KG/M2

## 2025-07-01 DIAGNOSIS — G47.33 OBSTRUCTIVE SLEEP APNEA: ICD-10-CM

## 2025-07-01 PROCEDURE — 99205 OFFICE O/P NEW HI 60 MIN: CPT | Performed by: INTERNAL MEDICINE

## 2025-07-01 PROCEDURE — G2211 COMPLEX E/M VISIT ADD ON: HCPCS | Performed by: INTERNAL MEDICINE

## 2025-07-01 PROCEDURE — 3077F SYST BP >= 140 MM HG: CPT | Performed by: INTERNAL MEDICINE

## 2025-07-01 PROCEDURE — 1125F AMNT PAIN NOTED PAIN PRSNT: CPT | Performed by: INTERNAL MEDICINE

## 2025-07-01 PROCEDURE — 3078F DIAST BP <80 MM HG: CPT | Performed by: INTERNAL MEDICINE

## 2025-07-01 NOTE — NURSING NOTE
"Chief Complaint   Patient presents with    CPAP Follow Up     Establishing care, bipap treatment going well       Initial BP (!) 149/73   Pulse 56   Ht 1.829 m (6' 0.01\")   Wt 121.2 kg (267 lb 3.2 oz)   SpO2 94%   BMI 36.23 kg/m   Estimated body mass index is 36.23 kg/m  as calculated from the following:    Height as of this encounter: 1.829 m (6' 0.01\").    Weight as of this encounter: 121.2 kg (267 lb 3.2 oz).    Medication Reconciliation: complete  ESS: 7    DME: [employer]    Jovani Christian, ERVIN    "

## 2025-07-01 NOTE — PROGRESS NOTES
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 Subjectively, he tells me that sleep quality is improved and good with bi-level S at 15/5 cmH2O and 1 LPM at night.  I have advised him to bring PAP device to clinic so that we can download the data and see if changes can be made. When I review his download data and make settings adjusted if needed, I will obtain an ISACC without supplemental O2 to assess his true O2 needs since he is only using 1 LPM.   A recent VBG from 2024 showed 7.43 / 48 / 29 / 32 which is reassuring.  We also discussed weight loss and he is working on it      Summary Recommendations:    Orders Placed This Encounter   Procedures    Comprehensive DME       Summary Counseling:  New sleep schedule recommenda         History of Present Illness:     Erich Craven has a visit today for follow-up of his CPAP use for moderate RIN and hypoxemia. He was last seen by Bennett Goltz PA-C in 2021 and consequently followed with [facility]  To briefly review, he was initially seen at the [facility] for previously diagnosed RIN and waking up feeling exhausted, His medical history is significant for paroxysmal atrial fibrillation, cardiac pacemaker, HFpEF, CAD, HTN, morbid obesity, PE (2012), COPD, hyperlipidemia, hypothyroidism, elevated right diaphragm.  He had had several sleep studies. In 2019, he underwent a PSG which showed AHI was 18.9/hr (4.9% centrals with a tendency towards periodic  breathing), with desaturations down to 74%. He spent 109.6 minutes below 90% SpO2 and 68.6 minutes below 89%. RDI 23.9/hr.  REM RDI 32.7/hr.  Supine RDI 71.1/hr. His non-supine RDI was 18/hr.  Periodic Limb Movement Index 84.9/hour, 17.3/hr were associated with arousals.  CO2 ranged from 42 to 45 mmHg, not suggestive of hypoventilation. His O2 baseline was 87-90% for the first half of the night. One lpm O2 was added to bring his SpO2 to 90% or better.   This was followed by Treatment PSG in 2019, titration PSG for patient with moderate obstructive and central sleep apnea at baseline. Respiratory events responded to CPAP and a pressure of 9 cm H2O was optimal in supine REM. BPAP titration was briefly attempted in supine REMN due to oxygen desaturations, but respiratory events continued on BPAP and seemed to spontaneously resolve with CPAP at 9 cm H2O. Hypoxemia was corrected at CPAP of 9 cm H2O but oxygen saturations trended in the low normal range of 90- 91% on CPAP.     However, the was concern for respiratory failure with chronic hypoxic hypercapnic failure and he underwent a split night PSG in 2022 while under care with [facility] which revealed severe RIN with AHI of 77.4 with SPO2 charles of 67% and he was prescribed BIPAP S 15/5 cmH2O and 1 lpm of O2 at night.    Overall, the patient rates their experience with PAP as 10 (0 poor, 10 great). The mask is comfortable. The mask is not leaking, 0 nights per week. They are not snoring with the mask on. They are not having gasp arousals.  They are not having significant oral/nasal dryness. The pressure settings are comfortable.     Patient uses nasal mask.    Bedtime is typically 10 pm. Usually it takes about 15 minutes to fall asleep with the mask on. Wake time is typically 6 am.  Patient is using PAP therapy 5 hours per night. The patient is usually getting 6 hours of sleep per night.  He also takes a diuretic and needs to wake up several times at night to use  the bathroom.     Patient does feel rested in the morning.    Total score - [place]: (Patient-Rptd) 7 (6/26/2025  4:43 PM)        PREVIOUS SLEEP STUDIES:  Split Night PSG with BIPAP S on 12/30/2022      AHI was 18.9/hr (4.9% centrals with a tendency towards periodic breathing), with desaturations down to 74%. He spent 109.6 minutes below 90% SpO2 and 68.6 minutes below 89%. RDI 23.9/hr.  REM RDI 32.7/hr.  Supine RDI 71.1/hr. His non-supine RDI was 18/hr.  Periodic Limb Movement Index 84.9/hour, 17.3/hr were associated with arousals.  CO2 ranged from 42 to 45 mmHg, not suggestive of hypoventilation. His O2 baseline was 87-90% for the first half of the night. One lpm O2 was added to bring his SpO2 to 90% or better.     Treatment PSG 9/22/2019  Sleep Architecture: Mildly increased arousal frequency. Stage N3 was not obtained.   The total recording time of the polysomnogram was 477.2 minutes. The total sleep time was 293.0 minutes. Sleep latency was increased at 62.6 minutes with the use of a sleep aid (Zolpidem 5 mg). REM latency was 281.0 minutes. Arousal index was increased at 20.1 arousals per hour. Sleep efficiency was decreased at 61.4%. Wake after sleep onset was 120.5 minutes. The patient spent 8.7% of total sleep time in Stage N1, 81.9% in Stage N2, 0.0% in Stage N3, and 9.4% in REM. Time in REM supine was 27.5 minutes.     Respiration:   The patient was titrated at pressures ranging from CPAP 5 cmH2O up to 9 cmH2O. The final pressure achieved was CPAP 9 cmH2O with a residual AHI of - events per hour. Time in REM supine on final pressure was 19.5 minutes.   This titration was considered:  Optimal (residual AHI < 5 events per hour and including REMsupine sleep at final pressure).   Snoring - was reported as mild on treatment.  Respiratory rate and pattern - was notable for normal respiratory rate and pattern.  Sustained Sleep Associated Hypoventilation - Transcutaneous carbon dioxide monitoring was not used;  however significant hypoventilation was not suggested by oximetry.  Sleep Associated Hypoxemia - (Greater than 5 minutes O2 sat at or below 88%) was present. Baseline oxygen saturation was 90.1%. Lowest oxygen saturation was 69.7%. Time spent less than or equal to 88% was 20.4 minutes. Time spent less than or equal to 89% was 95.5 minutes.     Movement Activity: An increased frequency of periodic limb movements of sleep is noted. However, majority of movements were not associated with EEG arousals.   Periodic Limb Activity - There were 525 PLMs during the entire study. The PLM index was 107.5 movements per hour. The PLM Arousal Index was 9.4 per hour.  REM EMG Activity - Excessive transient/sustained muscle activity was not present.  Nocturnal Behavior - Abnormal sleep related behaviors were not noted during/arising out of NREM / REM sleep. Bruxism - None apparent.     Cardiac Summary: Sinus rhythm.   The average pulse rate was 65.3 bpm. The minimum pulse rate was 56.9 bpm while the maximum pulse rate was 86.1 bpm. Arrhythmias were not noted.        Assessment:   This is a titration PSG for patient with moderate obstructive and central sleep apnea at baseline.   Respiratory events responded to CPAP and a pressure of 9 cm H2O was optimal in supine REM. BPAP titration was briefly attempted in supine REMN due to oxygen desaturations, but respiratory events continued on BPAP and seemed to spontaneously resolve with CPAP at 9 cm H2O.   Hypoxemia was corrected at CPAP of 9 cm H2O but oxygen saturations trended in the low normal range of 90- 91% on CPAP.   There was a high degree of periodic limb movements of sleep. However, majority of the movements were not associated with arousals.      Titration results:  CPAP titration: CPAP was titrated to 9 cm with elimination of apneas, hypopneas and desaturations. Supine REM was noted at this pressure. BiPAP 9/4 was attempted briefly in REM, but the patient had significant  desaturations on BiPAP. His O2 saturation was 89-91%.         Most Recent SCALES:    EPWORTH SLEEPINESS SCALE WITHIN 1 YEAR WITHIN 10 DAYS   Sitting and reading (Patient-Rptd) 1 (Patient-Rptd) 1   Watching TV (Patient-Rptd) 1 (Patient-Rptd) 1   Sitting, inactive in a public place (theatre or mtg.) (Patient-Rptd) 0  (Patient-Rptd) 0    As a passenger in a car (Patient-Rptd) 1 (Patient-Rptd) 1   Lying down to rest in the afternoon when circumstance permit (Patient-Rptd) 3 (Patient-Rptd) 3   Sitting and talking to someone (Patient-Rptd) 0 (Patient-Rptd) 0   Sitting quietly after lunch without alcohol (Patient-Rptd) 1 (Patient-Rptd) 1   In a car, while stopped for a few minutes in traffic (Patient-Rptd) 0 (Patient-Rptd) 0   TOTAL SCORE (Patient-Rptd) 7 (Patient-Rptd) 7   Normal < 11         0--none    1--mild    2--moderate  3--severe      INSOMNIA SEVERITY INDEX WITHIN 1 YEAR   Difficulty falling asleep (Patient-Rptd) 1   Difficult staying asleep (Patient-Rptd) 3   Problems waking up to early (Patient-Rptd) 1   How SATISFIED/DISSATISFIED are you with your CURRENT sleep pattern? (Patient-Rptd) 3   How NOTICEABLE to others do you think your sleep pattern is in terms of your quality of life? (Patient-Rptd) 2   How WORRIED/DISTRESSED are you about your current sleep pattern? (Patient-Rptd) 2   To what extent do you consider your sleep problem to INTERFERE with your daily fuctioning(e.g. daytime fatigue, mood, ability to function at work/daily chores, concentration, mood,etc.) CURRENTLY? (Patient-Rptd) 2   INSOMNIA SEVERITY INDEX TOTAL SCORE (Patient-Rptd) 14    --absence of insomnia (0-7); sub-threshold insomnia (8-14); moderate insomnia (15-21); and severe insomnia (22-28)--               Medications:     Current Outpatient Medications   Medication Sig Dispense Refill    acetaminophen (TYLENOL) 500 MG tablet Take 1,000 mg by mouth every 6 hours as needed for mild pain.      Acetylcarnitine HCl (ACETYL L-CARNITINE HCL PO)  Take 1 capsule by mouth daily. Dose unknown      albuterol (PROAIR HFA/PROVENTIL HFA/VENTOLIN HFA) 108 (90 Base) MCG/ACT inhaler INHALE 2 PUFFS INTO THE LUNGS EVERY 6 HOURS AS NEEDED FOR SHORTNESS OF BREATH OR WHEEZING OR COUGH 18 g 2    amLODIPine (NORVASC) 5 MG tablet Take 1 tablet (5 mg) by mouth daily. 90 tablet 3    aspirin (ASA) 81 MG chewable tablet Take 1 tablet by mouth daily      atorvastatin (LIPITOR) 80 MG tablet Take 1 tablet (80 mg) by mouth every evening. 90 tablet 3    diltiazem ER COATED BEADS (CARDIZEM CD/CARTIA XT) 300 MG 24 hr capsule TAKE 1 CAPSULE(300 MG) BY MOUTH DAILY 90 capsule 0    ferrous sulfate 45 MG TBCR CR tablet TAKE 1 TABLET BY MOUTH EVERY OTHER DAY. 45 tablet 0    Fluticasone-Umeclidin-Vilant (TRELEGY ELLIPTA) 100-62.5-25 MCG/ACT oral inhaler Inhale 1 puff into the lungs daily. 180 each 3    furosemide (LASIX) 40 MG tablet Take 0.5 tablets (20 mg) by mouth 2 times daily. 90 tablet 2    levothyroxine (SYNTHROID/LEVOTHROID) 175 MCG tablet TAKE 1 TABLET BY MOUTH DAILY 6 DAYS A WEEK, AND 2 TABLETS ON FRIDAYS 102 tablet 1    lisinopril (ZESTRIL) 20 MG tablet Take 1 tablet (20 mg) by mouth 2 times daily. 180 tablet 3    metolazone (ZAROXOLYN) 2.5 MG tablet Take 1 tablet (2.5 mg) by mouth every other day. Taken 30 minutes prior to furosemide (Patient taking differently: Take 2.5 mg by mouth daily as needed. Taken 30 minutes prior to furosemide) 45 tablet 0    Multiple vitamin TABS Take 1 tablet by mouth daily      polyethylene glycol (MIRALAX) 17 g packet Take 17 g by mouth daily 7 packet 0    potassium chloride cammy ER (KLOR-CON M10) 10 MEQ CR tablet Take 2 tablets (20 mEq) by mouth every morning AND 1 tablet (10 mEq) every evening. 270 tablet 3    rivaroxaban ANTICOAGULANT (XARELTO ANTICOAGULANT) 20 MG TABS tablet Take 1 tablet (20 mg) by mouth daily (with dinner). 90 tablet 1    tamsulosin (FLOMAX) 0.4 MG capsule Take 1 capsule (0.4 mg) by mouth daily. 90 capsule 0    TAURINE PO Take 1  capsule by mouth daily. Dose unknown      venlafaxine (EFFEXOR) 75 MG tablet TAKE 1 TABLET(75 MG) BY MOUTH TWICE DAILY 180 tablet 1     No current facility-administered medications for this visit.        No Known Allergies         Problem List:     Patient Active Problem List   Diagnosis    Morbid obesity (H)    PE (pulmonary thromboembolism) (H)    HTN (hypertension)    Chronic diastolic heart failure (H)    Dyspnea    RIN (obstructive sleep apnea)    Hypothyroidism    Paroxysmal A-fib (H)    Lymphedema    (HFpEF) heart failure with preserved ejection fraction (H)    Essential hypertension    Cardiac pacemaker in situ    DVT (deep venous thrombosis) (H)    Coronary artery disease involving native coronary artery with angina pectoris, unspecified whether native or transplanted heart    Obesity, Class III, BMI 40-49.9 (morbid obesity) (H)    Muscle tension headache    Heart failure with preserved ejection fraction, unspecified HF chronicity (H)    Anxiety    Deep vein thrombosis (DVT) of distal vein of lower extremity, unspecified chronicity, unspecified laterality (H)    Type 2 diabetes mellitus without complication, without long-term current use of insulin (H)    Chronic kidney disease, stage 3a (H)    Chronic right shoulder pain    Long term current use of anticoagulants with INR goal of 2.0-3.0    Benign neoplasm of cerebral meninges (H)    Ankle injury, right, initial encounter    Hypokalemia    Hypoxia    Pneumonia of left lung due to infectious organism, unspecified part of lung    Benign prostatic hyperplasia without lower urinary tract symptoms    History of coronary artery stent placement    Hypoxemia    Insomnia    Personal history of other venous thrombosis and embolism    Personal history of pulmonary embolism    Pontine lesion    Prediabetes    Presence of left artificial shoulder joint    Sick sinus syndrome (H)    Slow transit constipation    Chronic diastolic (congestive) heart failure (H)    Sleep  apnea    MARTÍNEZ (dyspnea on exertion)    Elevated troponin    DONA (acute kidney injury)    Orthostatic dizziness    Primary osteoarthritis of left knee            Past Medical History:     Does not need 02 supplement at night   Past Medical History:   Diagnosis Date    (HFpEF) heart failure with preserved ejection fraction (H)     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 Started in 2012    Hypothyroidism     Lymphedema     Mumps     Obesity, Class III, BMI 40-49.9 (morbid obesity) (H)     RIN (obstructive sleep apnea)     has refused CPAP    Pacemaker     Paroxysmal A-fib (H)     PE (pulmonary thromboembolism) (H)     Status post coronary angiogram 11/05/2019             Past Surgical History:    No h/o  upper airway surgery  Past Surgical History:   Procedure Laterality Date    cardiac stenting      COLONOSCOPY  2010    CV RIGHT HEART CATH MEASUREMENTS RECORDED N/A 11/05/2019    Procedure: Right Heart Cath;  Surgeon: Roberto Hernandez MD;  Location: [facility] CARDIAC CATH LAB    ENT SURGERY      tonsillectomy    EP PACEMAKER GENERATOR REPLACEMENT- DUAL N/A 9/23/2024    Procedure: Pacemaker Generator Replacement Dual;  Surgeon: Denis Perez MD;  Location: [facility] CARDIAC CATH LAB    EYE SURGERY  2012    IMPLANT PACEMAKER      INJECT NERVE BLOCK SUPRASCAPULAR Bilateral 08/26/2021    Procedure: BLOCK, NERVE, SUPRASCAPULAR, Bilateral, under ultrasound guidance;  Surgeon: Swetha Allen MD;  Location: [facility] OR    INJECT  "NERVE BLOCK SUPRASCAPULAR Bilateral 09/23/2021    Procedure: bilateral suprascapular nerve block;  Surgeon: Swetha Allen MD;  Location: [facility] OR    INJECT NERVE BLOCK SUPRASCAPULAR Left 09/15/2022    Procedure: BLOCK, NERVE, SUPRASCAPULAR- left;  Surgeon: Swetha Allen MD;  Location: [facility] OR    INJECT NERVE BLOCK SUPRASCAPULAR Left 10/20/2022    Procedure: BLOCK, NERVE, SUPRASCAPULAR- left;  Surgeon: Swetha Allen MD;  Location: [facility] OR    INJECT NERVE BLOCK SUPRASCAPULAR Left 01/05/2023    Procedure: BLOCK, NERVE, SUPRASCAPULAR (left);  Surgeon: Swetha Allen MD;  Location: [facility] OR    NERVE BLOCK PERIPHERAL Bilateral 03/10/2022    Procedure: bilateral suprascapular nerve block;  Surgeon: Swetha Allen MD;  Location: [facility]    ORTHOPEDIC SURGERY Right 2012    knee replacement    REVERSE ARTHROPLASTY SHOULDER Left 02/21/2023    Procedure: ARTHROPLASTY, SHOULDER, TOTAL, REVERSE LEFT;  Surgeon: Katelyn Vasquez MD;  Location: [facility] OR    VASCULAR SURGERY  2022              Physical Examination:   Objective:  Vitals: BP (!) 149/73   Pulse 56   Ht 1.829 m (6' 0.01\")   Wt 121.2 kg (267 lb 3.2 oz)   SpO2 94%   BMI 36.23 kg/m    BMI= Body mass index is 36.23 kg/m .  Exam:  Constitutional: healthy, alert, and no distress  Head: Normocephalic. No masses, lesions, tenderness or abnormalities  ENT: ENT exam normal, no neck nodes or sinus tenderness  Cardiovascular: negative, PMI normal. No lifts, heaves, or thrills. RRR. No murmurs, clicks gallops or rub  Respiratory: negative, Percussion normal. Good diaphragmatic excursion. Lungs clear  Gastrointestinal: Abdomen soft, non-tender. BS normal. No masses, organomegaly  : Deferred  Musculoskeletal: Significant edema  Skin: Several bruises on skin  Neurologic: Gait normal. Reflexes normal and symmetric. Sensation grossly WNL.  Psychiatric: mentation appears normal and affect normal/bright    Copy to: Anne Heath MD 7/1/2025     RTC in " 12 months    Total time spent reviewing medical records including previous testing and interpretation as well as direct patient contact and documentation on this date: 60 minutes

## 2025-07-07 ENCOUNTER — PATIENT OUTREACH (OUTPATIENT)
Dept: CARE COORDINATION | Facility: CLINIC | Age: 85
End: 2025-07-07
Payer: MEDICARE

## 2025-07-07 DIAGNOSIS — F41.9 ANXIETY: ICD-10-CM

## 2025-07-07 DIAGNOSIS — I48.0 PAROXYSMAL A-FIB (H): ICD-10-CM

## 2025-07-07 DIAGNOSIS — I82.4Z9 DEEP VEIN THROMBOSIS (DVT) OF DISTAL VEIN OF LOWER EXTREMITY, UNSPECIFIED CHRONICITY, UNSPECIFIED LATERALITY (H): ICD-10-CM

## 2025-07-07 PROBLEM — Z79.01 CURRENT USE OF LONG TERM ANTICOAGULATION: Status: ACTIVE | Noted: 2022-02-04

## 2025-07-07 RX ORDER — VENLAFAXINE 75 MG/1
75 TABLET ORAL 2 TIMES DAILY
Qty: 180 TABLET | Refills: 1 | Status: SHIPPED | OUTPATIENT
Start: 2025-07-07

## 2025-07-07 RX ORDER — RIVAROXABAN 20 MG/1
20 TABLET, FILM COATED ORAL
Qty: 90 TABLET | Refills: 0 | Status: SHIPPED | OUTPATIENT
Start: 2025-07-07

## 2025-07-09 ENCOUNTER — THERAPY VISIT (OUTPATIENT)
Dept: OCCUPATIONAL THERAPY | Facility: CLINIC | Age: 85
End: 2025-07-09
Payer: MEDICARE

## 2025-07-09 DIAGNOSIS — I89.0 LYMPHEDEMA: Primary | ICD-10-CM

## 2025-07-09 PROCEDURE — 97140 MANUAL THERAPY 1/> REGIONS: CPT | Mod: GO | Performed by: OCCUPATIONAL THERAPIST

## 2025-07-18 ENCOUNTER — ANCILLARY PROCEDURE (OUTPATIENT)
Dept: CARDIOLOGY | Facility: CLINIC | Age: 85
End: 2025-07-18
Attending: INTERNAL MEDICINE
Payer: COMMERCIAL

## 2025-07-18 DIAGNOSIS — I49.5 SICK SINUS SYNDROME (H): ICD-10-CM

## 2025-07-18 DIAGNOSIS — Z95.0 CARDIAC PACEMAKER IN SITU: ICD-10-CM

## 2025-07-18 LAB
MDC_IDC_EPISODE_DTM: NORMAL
MDC_IDC_EPISODE_DURATION: 10 S
MDC_IDC_EPISODE_DURATION: 11 S
MDC_IDC_EPISODE_DURATION: 5 S
MDC_IDC_EPISODE_DURATION: 5 S
MDC_IDC_EPISODE_DURATION: 6 S
MDC_IDC_EPISODE_DURATION: 7 S
MDC_IDC_EPISODE_DURATION: 8 S
MDC_IDC_EPISODE_DURATION: 8 S
MDC_IDC_EPISODE_ID: NORMAL
MDC_IDC_EPISODE_TYPE: NORMAL
MDC_IDC_LEAD_CONNECTION_STATUS: NORMAL
MDC_IDC_LEAD_CONNECTION_STATUS: NORMAL
MDC_IDC_LEAD_IMPLANT_DT: NORMAL
MDC_IDC_LEAD_IMPLANT_DT: NORMAL
MDC_IDC_LEAD_LOCATION: NORMAL
MDC_IDC_LEAD_LOCATION: NORMAL
MDC_IDC_LEAD_LOCATION_DETAIL_1: NORMAL
MDC_IDC_LEAD_LOCATION_DETAIL_1: NORMAL
MDC_IDC_LEAD_MFG: NORMAL
MDC_IDC_LEAD_MFG: NORMAL
MDC_IDC_LEAD_MODEL: NORMAL
MDC_IDC_LEAD_MODEL: NORMAL
MDC_IDC_LEAD_POLARITY_TYPE: NORMAL
MDC_IDC_LEAD_POLARITY_TYPE: NORMAL
MDC_IDC_LEAD_SERIAL: NORMAL
MDC_IDC_LEAD_SERIAL: NORMAL
MDC_IDC_MSMT_BATTERY_DTM: NORMAL
MDC_IDC_MSMT_BATTERY_REMAINING_LONGEVITY: 174 MO
MDC_IDC_MSMT_BATTERY_REMAINING_PERCENTAGE: 100 %
MDC_IDC_MSMT_BATTERY_STATUS: NORMAL
MDC_IDC_MSMT_LEADCHNL_RA_IMPEDANCE_VALUE: 405 OHM
MDC_IDC_MSMT_LEADCHNL_RA_PACING_THRESHOLD_AMPLITUDE: 0.5 V
MDC_IDC_MSMT_LEADCHNL_RA_PACING_THRESHOLD_PULSEWIDTH: 0.4 MS
MDC_IDC_MSMT_LEADCHNL_RV_IMPEDANCE_VALUE: 391 OHM
MDC_IDC_PG_IMPLANT_DTM: NORMAL
MDC_IDC_PG_MFG: NORMAL
MDC_IDC_PG_MODEL: NORMAL
MDC_IDC_PG_SERIAL: NORMAL
MDC_IDC_PG_TYPE: NORMAL
MDC_IDC_SESS_CLINIC_NAME: NORMAL
MDC_IDC_SESS_DTM: NORMAL
MDC_IDC_SESS_TYPE: NORMAL
MDC_IDC_SET_BRADY_AT_MODE_SWITCH_MODE: NORMAL
MDC_IDC_SET_BRADY_AT_MODE_SWITCH_RATE: 150 {BEATS}/MIN
MDC_IDC_SET_BRADY_LOWRATE: 60 {BEATS}/MIN
MDC_IDC_SET_BRADY_MAX_SENSOR_RATE: 130 {BEATS}/MIN
MDC_IDC_SET_BRADY_MAX_TRACKING_RATE: 130 {BEATS}/MIN
MDC_IDC_SET_BRADY_MODE: NORMAL
MDC_IDC_SET_BRADY_PAV_DELAY_HIGH: 220 MS
MDC_IDC_SET_BRADY_PAV_DELAY_LOW: 250 MS
MDC_IDC_SET_BRADY_SAV_DELAY_HIGH: 220 MS
MDC_IDC_SET_BRADY_SAV_DELAY_LOW: 250 MS
MDC_IDC_SET_LEADCHNL_RA_PACING_AMPLITUDE: 2 V
MDC_IDC_SET_LEADCHNL_RA_PACING_CAPTURE_MODE: NORMAL
MDC_IDC_SET_LEADCHNL_RA_PACING_POLARITY: NORMAL
MDC_IDC_SET_LEADCHNL_RA_PACING_PULSEWIDTH: 0.4 MS
MDC_IDC_SET_LEADCHNL_RA_SENSING_ADAPTATION_MODE: NORMAL
MDC_IDC_SET_LEADCHNL_RA_SENSING_POLARITY: NORMAL
MDC_IDC_SET_LEADCHNL_RA_SENSING_SENSITIVITY: 0.5 MV
MDC_IDC_SET_LEADCHNL_RV_PACING_AMPLITUDE: 2.6 V
MDC_IDC_SET_LEADCHNL_RV_PACING_CAPTURE_MODE: NORMAL
MDC_IDC_SET_LEADCHNL_RV_PACING_POLARITY: NORMAL
MDC_IDC_SET_LEADCHNL_RV_PACING_PULSEWIDTH: 1 MS
MDC_IDC_SET_LEADCHNL_RV_SENSING_ADAPTATION_MODE: NORMAL
MDC_IDC_SET_LEADCHNL_RV_SENSING_POLARITY: NORMAL
MDC_IDC_SET_LEADCHNL_RV_SENSING_SENSITIVITY: 1 MV
MDC_IDC_SET_ZONE_DETECTION_INTERVAL: 375 MS
MDC_IDC_SET_ZONE_STATUS: NORMAL
MDC_IDC_SET_ZONE_TYPE: NORMAL
MDC_IDC_SET_ZONE_VENDOR_TYPE: NORMAL
MDC_IDC_STAT_AT_BURDEN_PERCENT: 1 %
MDC_IDC_STAT_AT_DTM_END: NORMAL
MDC_IDC_STAT_AT_DTM_START: NORMAL
MDC_IDC_STAT_BRADY_DTM_END: NORMAL
MDC_IDC_STAT_BRADY_DTM_START: NORMAL
MDC_IDC_STAT_BRADY_RA_PERCENT_PACED: 22 %
MDC_IDC_STAT_BRADY_RV_PERCENT_PACED: 0 %
MDC_IDC_STAT_EPISODE_RECENT_COUNT: 0
MDC_IDC_STAT_EPISODE_RECENT_COUNT: 1
MDC_IDC_STAT_EPISODE_RECENT_COUNT: 3
MDC_IDC_STAT_EPISODE_RECENT_COUNT_DTM_END: NORMAL
MDC_IDC_STAT_EPISODE_RECENT_COUNT_DTM_START: NORMAL
MDC_IDC_STAT_EPISODE_TYPE: NORMAL
MDC_IDC_STAT_EPISODE_VENDOR_TYPE: NORMAL
MDC_IDC_STAT_EPISODE_VENDOR_TYPE: NORMAL

## 2025-07-18 PROCEDURE — 93296 REM INTERROG EVL PM/IDS: CPT | Performed by: INTERNAL MEDICINE

## 2025-07-18 PROCEDURE — 93294 REM INTERROG EVL PM/LDLS PM: CPT | Performed by: INTERNAL MEDICINE

## 2025-07-26 DIAGNOSIS — E03.9 HYPOTHYROIDISM, UNSPECIFIED TYPE: ICD-10-CM

## 2025-07-27 ENCOUNTER — HEALTH MAINTENANCE LETTER (OUTPATIENT)
Age: 85
End: 2025-07-27

## 2025-07-27 ENCOUNTER — MYC MEDICAL ADVICE (OUTPATIENT)
Dept: FAMILY MEDICINE | Facility: CLINIC | Age: 85
End: 2025-07-27
Payer: MEDICARE

## 2025-07-27 DIAGNOSIS — I10 ESSENTIAL HYPERTENSION: ICD-10-CM

## 2025-07-28 DIAGNOSIS — I50.32 CHRONIC DIASTOLIC HEART FAILURE (H): Primary | ICD-10-CM

## 2025-07-28 DIAGNOSIS — E03.9 HYPOTHYROIDISM, UNSPECIFIED TYPE: ICD-10-CM

## 2025-07-28 DIAGNOSIS — I50.32 CHRONIC DIASTOLIC HEART FAILURE (H): ICD-10-CM

## 2025-07-28 RX ORDER — FUROSEMIDE 40 MG/1
20 TABLET ORAL 2 TIMES DAILY
Qty: 90 TABLET | Refills: 0 | Status: SHIPPED | OUTPATIENT
Start: 2025-07-28

## 2025-07-28 RX ORDER — DILTIAZEM HYDROCHLORIDE 300 MG/1
CAPSULE, COATED, EXTENDED RELEASE ORAL
Qty: 90 CAPSULE | Refills: 0 | Status: SHIPPED | OUTPATIENT
Start: 2025-07-28

## 2025-07-28 RX ORDER — LEVOTHYROXINE SODIUM 175 UG/1
TABLET ORAL
Qty: 102 TABLET | Refills: 0 | Status: SHIPPED | OUTPATIENT
Start: 2025-07-28

## 2025-07-29 NOTE — TELEPHONE ENCOUNTER
Labs are required for further refills.  Please notify patient, repeat basic metabolic panel and thyroid function test at the earliest convenience

## 2025-07-30 NOTE — TELEPHONE ENCOUNTER
Patient Contact     Attempt # 1     Was call answered?  no.  Left message on voicemail with information to call triage back.     Upon call back, please assist with scheduling lab appt.    Melissa VILLARREAL,  Triage RN  [facility]

## 2025-07-31 ENCOUNTER — TELEPHONE (OUTPATIENT)
Dept: FAMILY MEDICINE | Facility: CLINIC | Age: 85
End: 2025-07-31
Payer: MEDICARE

## 2025-07-31 DIAGNOSIS — I10 ESSENTIAL HYPERTENSION: ICD-10-CM

## 2025-07-31 RX ORDER — DILTIAZEM HYDROCHLORIDE 300 MG/1
CAPSULE, COATED, EXTENDED RELEASE ORAL
Qty: 90 CAPSULE | Refills: 0 | OUTPATIENT
Start: 2025-07-31

## 2025-07-31 NOTE — TELEPHONE ENCOUNTER
Patient notified. Lab only appointment scheduled tomorrow. Patient/Caller with no further questions or concerns.     Rosemarie Mendes RN

## 2025-08-01 ENCOUNTER — LAB (OUTPATIENT)
Dept: LAB | Facility: CLINIC | Age: 85
End: 2025-08-01
Payer: MEDICARE

## 2025-08-01 DIAGNOSIS — E11.9 TYPE 2 DIABETES MELLITUS WITHOUT COMPLICATION, WITHOUT LONG-TERM CURRENT USE OF INSULIN (H): Primary | ICD-10-CM

## 2025-08-06 ENCOUNTER — THERAPY VISIT (OUTPATIENT)
Dept: OCCUPATIONAL THERAPY | Facility: CLINIC | Age: 85
End: 2025-08-06
Payer: MEDICARE

## 2025-08-06 DIAGNOSIS — I89.0 LYMPHEDEMA: Primary | ICD-10-CM

## 2025-08-06 PROCEDURE — 97140 MANUAL THERAPY 1/> REGIONS: CPT | Mod: GO | Performed by: OCCUPATIONAL THERAPIST

## (undated) DEVICE — PREP BRUSH SURG SCRUB CHLOROXYLENOL PCMX 3% 371163

## (undated) DEVICE — Device

## (undated) DEVICE — COVER ULTRASOUND PROBE W/GEL FLEXI-FEEL 6"X58" LF  25-FF658

## (undated) DEVICE — PACK PCMKR PERM SRG PROC LF SAN32PC573

## (undated) DEVICE — LINEN ORTHO PACK 5446

## (undated) DEVICE — SPONGE SURGIFOAM 100 1974

## (undated) DEVICE — PACK SET-UP STD 9102

## (undated) DEVICE — ESU CLEANER TIP 31142717

## (undated) DEVICE — DRAPE U-DRAPE 1015NSD NON-STERILE

## (undated) DEVICE — TRAY PAIN INJECTION 97A 640

## (undated) DEVICE — GLOVE BIOGEL PI SZ 7.5 40875

## (undated) DEVICE — SU MONOCRYL 3-0 PS-1 27" Y936H

## (undated) DEVICE — GLOVE PROTEXIS POWDER FREE SMT 7.0  2D72PT70X

## (undated) DEVICE — MANIFOLD KIT ANGIO AUTOMATED 014613

## (undated) DEVICE — INTRO SHEATH 7FRX10CM PINNACLE RSS702

## (undated) DEVICE — SOL NACL 0.9% IRRIG 1000ML BOTTLE 2F7124

## (undated) DEVICE — DRAPE U-POUCH 34X29" 1067

## (undated) DEVICE — PREP DURAPREP 26ML APL 8630

## (undated) DEVICE — ESU ELEC NDL 1" E1552

## (undated) DEVICE — NDL CANNULA SOLOPLEX STIM 21GX100MM 001187-77

## (undated) DEVICE — POSITIONER ARMBOARD FOAM 1PAIR LF FP-ARMB1

## (undated) DEVICE — COVER CAMERA IN-LIGHT DISP LT-C02

## (undated) DEVICE — PREP CHLORAPREP W/ORANGE TINT 10.5ML 260715

## (undated) DEVICE — DRAPE IOBAN INCISE 23X17" 6650EZ

## (undated) DEVICE — BONE CLEANING TIP INTERPULSE  0210-010-000

## (undated) DEVICE — EYE PREP BETADINE 5% SOLUTION 30ML 0065-0411-30

## (undated) DEVICE — SU ETHIBOND 0 CT-1 CR 8X18" CX21D

## (undated) DEVICE — SYR ANGIO CONTRAST 12ML H965700870071

## (undated) DEVICE — TOTE ANGIO CORP PC15AT SAN32CC83O

## (undated) DEVICE — SUCTION MANIFOLD NEPTUNE 2 SYS 4 PORT 0702-020-000

## (undated) DEVICE — LINEN TOWEL PACK X5 5464

## (undated) DEVICE — NDL 22GA 1.5"

## (undated) DEVICE — BLADE SAW SAGITTAL STRK 18X90X0.89MM  6118-089-090

## (undated) DEVICE — SOL WATER IRRIG 1000ML BOTTLE 2F7114

## (undated) DEVICE — BIT DRILL BIOMET PERIPHERAL SCR 2.7MM SS 405889

## (undated) DEVICE — RESTRAINT LIMB HOLDER ANKLE/WRIST FOAM W/QUICK RELEASE 2533

## (undated) DEVICE — DECANTER TRANSFER DEVICE 2008S

## (undated) DEVICE — SU SILK 2-0 TIE 12X30" A305H

## (undated) DEVICE — KIT HAND CONTROL ANGIOTOUCH ACIST 65CM AT-P65

## (undated) DEVICE — DEFIB PRO-PADZ LVP LQD GEL ADULT 8900-2105-01

## (undated) DEVICE — STRAP KNEE/BODY 31143004

## (undated) DEVICE — NDL PERC ENTRY THINWALL 18GA 7.0" G00166

## (undated) DEVICE — SOL HYDROGEN PEROXIDE 3% 4OZ BOTTLE F0010

## (undated) DEVICE — DEVICE RETRIEVER HEWSON 71111579

## (undated) DEVICE — SU MONOCRYL 2-0 SH 27" UND Y417H

## (undated) DEVICE — SUCTION IRR SYSTEM W/O TIP INTERPULSE HANDPIECE 0210-100-000

## (undated) DEVICE — SYR BULB IRRIG DOVER 60 ML LATEX FREE 67000

## (undated) DEVICE — ESU GROUND PAD ADULT W/CORD E7507

## (undated) DEVICE — SCREW GUIDE AUGMENT REAM 110040300

## (undated) DEVICE — DRAPE POUCH INSTRUMENT 1018

## (undated) DEVICE — DRSG AQUACEL AG HYDROFIBER  3.5X10" 422605

## (undated) DEVICE — SU ETHIBOND 2 V-37 4X30" MX69G

## (undated) DEVICE — SPONGE LAP 18X18" X8435

## (undated) DEVICE — GLOVE BIOGEL PI MICRO SZ 7.5 48575

## (undated) DEVICE — SOL NACL 0.9% INJ 1000ML BAG 2B1324X

## (undated) DEVICE — SU FIBERWIRE 2 38"  AR-7200

## (undated) DEVICE — SYR ANGIOGRAPHY MULTIUSE KIT ACIST 014612

## (undated) DEVICE — IMM KIT SHOULDER TMAX MASK FACE 7210559

## (undated) DEVICE — ESU PENCIL W/SMOKE EVAC NEPTUNE STRYKER 0703-046-000

## (undated) DEVICE — IMM KIT SHOULDER STABILIZATION 7210573

## (undated) DEVICE — DRSG STERI STRIP 1/2X4" R1547

## (undated) DEVICE — BIT DRILL BIOMET CENTRAL SCR 3.2MM SS 405883

## (undated) RX ORDER — ACETAMINOPHEN 325 MG/1
TABLET ORAL
Status: DISPENSED
Start: 2023-02-21

## (undated) RX ORDER — FENTANYL CITRATE 50 UG/ML
INJECTION, SOLUTION INTRAMUSCULAR; INTRAVENOUS
Status: DISPENSED
Start: 2023-02-21

## (undated) RX ORDER — REGADENOSON 0.08 MG/ML
INJECTION, SOLUTION INTRAVENOUS
Status: DISPENSED
Start: 2021-06-18

## (undated) RX ORDER — TRANEXAMIC ACID 650 MG/1
TABLET ORAL
Status: DISPENSED
Start: 2023-02-21

## (undated) RX ORDER — ONDANSETRON 2 MG/ML
INJECTION INTRAMUSCULAR; INTRAVENOUS
Status: DISPENSED
Start: 2023-02-21

## (undated) RX ORDER — CEFAZOLIN SODIUM/WATER 2 G/20 ML
SYRINGE (ML) INTRAVENOUS
Status: DISPENSED
Start: 2023-02-21

## (undated) RX ORDER — HEPARIN SODIUM 200 [USP'U]/100ML
INJECTION, SOLUTION INTRAVENOUS
Status: DISPENSED
Start: 2019-11-05

## (undated) RX ORDER — FENTANYL CITRATE 50 UG/ML
INJECTION, SOLUTION INTRAMUSCULAR; INTRAVENOUS
Status: DISPENSED
Start: 2019-11-05

## (undated) RX ORDER — FENTANYL CITRATE 50 UG/ML
INJECTION, SOLUTION INTRAMUSCULAR; INTRAVENOUS
Status: DISPENSED
Start: 2024-09-23

## (undated) RX ORDER — FENTANYL CITRATE-0.9 % NACL/PF 10 MCG/ML
PLASTIC BAG, INJECTION (ML) INTRAVENOUS
Status: DISPENSED
Start: 2023-02-21

## (undated) RX ORDER — LIDOCAINE HYDROCHLORIDE 10 MG/ML
INJECTION, SOLUTION EPIDURAL; INFILTRATION; INTRACAUDAL; PERINEURAL
Status: DISPENSED
Start: 2019-11-05

## (undated) RX ORDER — HYDROMORPHONE HYDROCHLORIDE 1 MG/ML
INJECTION, SOLUTION INTRAMUSCULAR; INTRAVENOUS; SUBCUTANEOUS
Status: DISPENSED
Start: 2023-02-21

## (undated) RX ORDER — HEPARIN SODIUM 1000 [USP'U]/ML
INJECTION, SOLUTION INTRAVENOUS; SUBCUTANEOUS
Status: DISPENSED
Start: 2019-11-05

## (undated) RX ORDER — VANCOMYCIN HYDROCHLORIDE 1 G/20ML
INJECTION, POWDER, LYOPHILIZED, FOR SOLUTION INTRAVENOUS
Status: DISPENSED
Start: 2023-02-21

## (undated) RX ORDER — OXYCODONE HYDROCHLORIDE 5 MG/1
TABLET ORAL
Status: DISPENSED
Start: 2023-02-21

## (undated) RX ORDER — CEFAZOLIN SODIUM 2 G/100ML
INJECTION, SOLUTION INTRAVENOUS
Status: DISPENSED
Start: 2024-09-23